# Patient Record
Sex: FEMALE | Race: BLACK OR AFRICAN AMERICAN | Employment: UNEMPLOYED | URBAN - METROPOLITAN AREA
[De-identification: names, ages, dates, MRNs, and addresses within clinical notes are randomized per-mention and may not be internally consistent; named-entity substitution may affect disease eponyms.]

---

## 2017-01-11 ENCOUNTER — HOSPITAL ENCOUNTER (INPATIENT)
Age: 52
LOS: 3 days | Discharge: HOME | DRG: 133 | End: 2017-01-14
Attending: EMERGENCY MEDICINE | Admitting: INTERNAL MEDICINE
Payer: MEDICAID

## 2017-01-11 ENCOUNTER — APPOINTMENT (OUTPATIENT)
Dept: CT IMAGING | Age: 52
DRG: 133 | End: 2017-01-11
Attending: PHYSICIAN ASSISTANT
Payer: MEDICAID

## 2017-01-11 ENCOUNTER — APPOINTMENT (OUTPATIENT)
Dept: GENERAL RADIOLOGY | Age: 52
DRG: 133 | End: 2017-01-11
Attending: EMERGENCY MEDICINE
Payer: MEDICAID

## 2017-01-11 DIAGNOSIS — G93.40 ACUTE ENCEPHALOPATHY: ICD-10-CM

## 2017-01-11 DIAGNOSIS — J96.02 ACUTE RESPIRATORY FAILURE WITH HYPERCAPNIA (HCC): Primary | ICD-10-CM

## 2017-01-11 DIAGNOSIS — F19.10 SUBSTANCE ABUSE (HCC): ICD-10-CM

## 2017-01-11 PROBLEM — J96.90 RESPIRATORY FAILURE (HCC): Status: ACTIVE | Noted: 2017-01-11

## 2017-01-11 LAB
ALBUMIN SERPL BCP-MCNC: 4.2 G/DL (ref 3.4–5)
ALBUMIN/GLOB SERPL: 1.1 {RATIO} (ref 0.8–1.7)
ALP SERPL-CCNC: 122 U/L (ref 45–117)
ALT SERPL-CCNC: 25 U/L (ref 13–56)
AMPHET UR QL SCN: NEGATIVE
ANION GAP BLD CALC-SCNC: 9 MMOL/L (ref 3–18)
APPEARANCE UR: CLEAR
ARTERIAL PATENCY WRIST A: ABNORMAL
ARTERIAL PATENCY WRIST A: YES
AST SERPL W P-5'-P-CCNC: 9 U/L (ref 15–37)
BACTERIA URNS QL MICRO: ABNORMAL /HPF
BARBITURATES UR QL SCN: NEGATIVE
BASE DEFICIT BLD-SCNC: 1 MMOL/L
BASE DEFICIT BLD-SCNC: 2 MMOL/L
BASOPHILS # BLD AUTO: 0 K/UL (ref 0–0.1)
BASOPHILS # BLD: 0 % (ref 0–2)
BDY SITE: ABNORMAL
BDY SITE: ABNORMAL
BENZODIAZ UR QL: NEGATIVE
BILIRUB SERPL-MCNC: 0.2 MG/DL (ref 0.2–1)
BILIRUB UR QL: NEGATIVE
BNP SERPL-MCNC: 177 PG/ML (ref 0–900)
BUN SERPL-MCNC: 16 MG/DL (ref 7–18)
BUN/CREAT SERPL: 11 (ref 12–20)
CALCIUM SERPL-MCNC: 9.2 MG/DL (ref 8.5–10.1)
CANNABINOIDS UR QL SCN: NEGATIVE
CHLORIDE SERPL-SCNC: 101 MMOL/L (ref 100–108)
CK MB CFR SERPL CALC: ABNORMAL % (ref 0–4)
CK MB SERPL-MCNC: <0.5 NG/ML (ref 0.5–3.6)
CK SERPL-CCNC: 26 U/L (ref 26–192)
CO2 SERPL-SCNC: 29 MMOL/L (ref 21–32)
COCAINE UR QL SCN: NEGATIVE
COLOR UR: YELLOW
CREAT SERPL-MCNC: 1.52 MG/DL (ref 0.6–1.3)
DIFFERENTIAL METHOD BLD: ABNORMAL
EOSINOPHIL # BLD: 0 K/UL (ref 0–0.4)
EOSINOPHIL NFR BLD: 0 % (ref 0–5)
EPITH CASTS URNS QL MICRO: ABNORMAL /LPF (ref 0–5)
ERYTHROCYTE [DISTWIDTH] IN BLOOD BY AUTOMATED COUNT: 14.3 % (ref 11.6–14.5)
EST. AVERAGE GLUCOSE BLD GHB EST-MCNC: 100 MG/DL
GAS FLOW.O2 O2 DELIVERY SYS: ABNORMAL L/MIN
GAS FLOW.O2 O2 DELIVERY SYS: ABNORMAL L/MIN
GAS FLOW.O2 SETTING OXYMISER: 16 BPM
GAS FLOW.O2 SETTING OXYMISER: 16 BPM
GLOBULIN SER CALC-MCNC: 4 G/DL (ref 2–4)
GLUCOSE SERPL-MCNC: 139 MG/DL (ref 74–99)
GLUCOSE UR STRIP.AUTO-MCNC: NEGATIVE MG/DL
HBA1C MFR BLD: 5.1 % (ref 4.2–5.6)
HCG UR QL: NEGATIVE
HCO3 BLD-SCNC: 27 MMOL/L (ref 22–26)
HCO3 BLD-SCNC: 27.9 MMOL/L (ref 22–26)
HCT VFR BLD AUTO: 43.6 % (ref 35–45)
HDSCOM,HDSCOM: NORMAL
HGB BLD-MCNC: 14.1 G/DL (ref 12–16)
HGB UR QL STRIP: NEGATIVE
INR PPP: 0.9 (ref 0.8–1.2)
INSPIRATION.DURATION SETTING TIME VENT: 1 SEC
KETONES UR QL STRIP.AUTO: NEGATIVE MG/DL
LACTATE BLD-SCNC: 2.6 MMOL/L (ref 0.4–2)
LEUKOCYTE ESTERASE UR QL STRIP.AUTO: NEGATIVE
LYMPHOCYTES # BLD AUTO: 17 % (ref 21–52)
LYMPHOCYTES # BLD: 2.5 K/UL (ref 0.9–3.6)
MAGNESIUM SERPL-MCNC: 3.6 MG/DL (ref 1.8–2.4)
MCH RBC QN AUTO: 29.6 PG (ref 24–34)
MCHC RBC AUTO-ENTMCNC: 32.3 G/DL (ref 31–37)
MCV RBC AUTO: 91.4 FL (ref 74–97)
METHADONE UR QL: NEGATIVE
MONOCYTES # BLD: 0.8 K/UL (ref 0.05–1.2)
MONOCYTES NFR BLD AUTO: 5 % (ref 3–10)
NEUTS SEG # BLD: 11.3 K/UL (ref 1.8–8)
NEUTS SEG NFR BLD AUTO: 78 % (ref 40–73)
NITRITE UR QL STRIP.AUTO: NEGATIVE
O2/TOTAL GAS SETTING VFR VENT: 100 %
O2/TOTAL GAS SETTING VFR VENT: 35 %
OPIATES UR QL: NEGATIVE
PCO2 BLD: 62 MMHG (ref 35–45)
PCO2 BLD: 62.6 MMHG (ref 35–45)
PCP UR QL: NEGATIVE
PEEP RESPIRATORY: 5 CMH2O
PEEP RESPIRATORY: 5 CMH2O
PH BLD: 7.25 [PH] (ref 7.35–7.45)
PH BLD: 7.26 [PH] (ref 7.35–7.45)
PH UR STRIP: 6.5 [PH] (ref 5–8)
PIP ISTAT,IPIP: 23
PLATELET # BLD AUTO: 416 K/UL (ref 135–420)
PMV BLD AUTO: 11 FL (ref 9.2–11.8)
PO2 BLD: 124 MMHG (ref 80–100)
PO2 BLD: 387 MMHG (ref 80–100)
POTASSIUM SERPL-SCNC: 4.1 MMOL/L (ref 3.5–5.5)
PROT SERPL-MCNC: 8.2 G/DL (ref 6.4–8.2)
PROT UR STRIP-MCNC: ABNORMAL MG/DL
PROTHROMBIN TIME: 12.3 SEC (ref 11.5–15.2)
RBC # BLD AUTO: 4.77 M/UL (ref 4.2–5.3)
RBC #/AREA URNS HPF: ABNORMAL /HPF (ref 0–5)
SAO2 % BLD: 100 % (ref 92–97)
SAO2 % BLD: 98 % (ref 92–97)
SERVICE CMNT-IMP: ABNORMAL
SERVICE CMNT-IMP: ABNORMAL
SODIUM SERPL-SCNC: 139 MMOL/L (ref 136–145)
SP GR UR REFRACTOMETRY: 1.01 (ref 1–1.03)
SPECIMEN TYPE: ABNORMAL
SPECIMEN TYPE: ABNORMAL
T4 FREE SERPL-MCNC: 1 NG/DL (ref 0.7–1.5)
TOTAL RESP. RATE, ITRR: 19
TOTAL RESP. RATE, ITRR: 20
TROPONIN I SERPL-MCNC: <0.02 NG/ML (ref 0–0.04)
TSH SERPL DL<=0.05 MIU/L-ACNC: 0.58 UIU/ML (ref 0.36–3.74)
UROBILINOGEN UR QL STRIP.AUTO: 0.2 EU/DL (ref 0.2–1)
VENTILATION MODE VENT: ABNORMAL
VENTILATION MODE VENT: ABNORMAL
VOLUME CONTROL PLUS IVLCP: YES
VOLUME CONTROL PLUS IVLCP: YES
VT SETTING VENT: 341 ML
VT SETTING VENT: 500 ML
WBC # BLD AUTO: 14.6 K/UL (ref 4.6–13.2)
WBC URNS QL MICRO: ABNORMAL /HPF (ref 0–4)

## 2017-01-11 PROCEDURE — 80053 COMPREHEN METABOLIC PANEL: CPT | Performed by: EMERGENCY MEDICINE

## 2017-01-11 PROCEDURE — 74011000258 HC RX REV CODE- 258: Performed by: PHYSICIAN ASSISTANT

## 2017-01-11 PROCEDURE — 82550 ASSAY OF CK (CPK): CPT | Performed by: EMERGENCY MEDICINE

## 2017-01-11 PROCEDURE — 83036 HEMOGLOBIN GLYCOSYLATED A1C: CPT | Performed by: PHYSICIAN ASSISTANT

## 2017-01-11 PROCEDURE — 5A1935Z RESPIRATORY VENTILATION, LESS THAN 24 CONSECUTIVE HOURS: ICD-10-PCS | Performed by: EMERGENCY MEDICINE

## 2017-01-11 PROCEDURE — 80307 DRUG TEST PRSMV CHEM ANLYZR: CPT | Performed by: EMERGENCY MEDICINE

## 2017-01-11 PROCEDURE — 0BH17EZ INSERTION OF ENDOTRACHEAL AIRWAY INTO TRACHEA, VIA NATURAL OR ARTIFICIAL OPENING: ICD-10-PCS | Performed by: EMERGENCY MEDICINE

## 2017-01-11 PROCEDURE — 85610 PROTHROMBIN TIME: CPT | Performed by: PHYSICIAN ASSISTANT

## 2017-01-11 PROCEDURE — 96365 THER/PROPH/DIAG IV INF INIT: CPT

## 2017-01-11 PROCEDURE — 93005 ELECTROCARDIOGRAM TRACING: CPT

## 2017-01-11 PROCEDURE — 82803 BLOOD GASES ANY COMBINATION: CPT

## 2017-01-11 PROCEDURE — 83735 ASSAY OF MAGNESIUM: CPT | Performed by: EMERGENCY MEDICINE

## 2017-01-11 PROCEDURE — 94640 AIRWAY INHALATION TREATMENT: CPT

## 2017-01-11 PROCEDURE — 77030035230

## 2017-01-11 PROCEDURE — 83880 ASSAY OF NATRIURETIC PEPTIDE: CPT | Performed by: EMERGENCY MEDICINE

## 2017-01-11 PROCEDURE — 31500 INSERT EMERGENCY AIRWAY: CPT

## 2017-01-11 PROCEDURE — 87040 BLOOD CULTURE FOR BACTERIA: CPT | Performed by: PHYSICIAN ASSISTANT

## 2017-01-11 PROCEDURE — 71010 XR CHEST PORT: CPT

## 2017-01-11 PROCEDURE — 94002 VENT MGMT INPAT INIT DAY: CPT

## 2017-01-11 PROCEDURE — 81001 URINALYSIS AUTO W/SCOPE: CPT | Performed by: EMERGENCY MEDICINE

## 2017-01-11 PROCEDURE — 84443 ASSAY THYROID STIM HORMONE: CPT | Performed by: EMERGENCY MEDICINE

## 2017-01-11 PROCEDURE — 74011250636 HC RX REV CODE- 250/636: Performed by: EMERGENCY MEDICINE

## 2017-01-11 PROCEDURE — 87086 URINE CULTURE/COLONY COUNT: CPT | Performed by: EMERGENCY MEDICINE

## 2017-01-11 PROCEDURE — 74011000250 HC RX REV CODE- 250: Performed by: INTERNAL MEDICINE

## 2017-01-11 PROCEDURE — 36600 WITHDRAWAL OF ARTERIAL BLOOD: CPT

## 2017-01-11 PROCEDURE — 74000 XR ABD (KUB): CPT

## 2017-01-11 PROCEDURE — 99285 EMERGENCY DEPT VISIT HI MDM: CPT

## 2017-01-11 PROCEDURE — 83605 ASSAY OF LACTIC ACID: CPT

## 2017-01-11 PROCEDURE — 51702 INSERT TEMP BLADDER CATH: CPT

## 2017-01-11 PROCEDURE — 65610000006 HC RM INTENSIVE CARE

## 2017-01-11 PROCEDURE — 85025 COMPLETE CBC W/AUTO DIFF WBC: CPT | Performed by: EMERGENCY MEDICINE

## 2017-01-11 PROCEDURE — 74011250636 HC RX REV CODE- 250/636: Performed by: PHYSICIAN ASSISTANT

## 2017-01-11 PROCEDURE — 84439 ASSAY OF FREE THYROXINE: CPT | Performed by: EMERGENCY MEDICINE

## 2017-01-11 PROCEDURE — 96366 THER/PROPH/DIAG IV INF ADDON: CPT

## 2017-01-11 PROCEDURE — 74011000250 HC RX REV CODE- 250: Performed by: PHYSICIAN ASSISTANT

## 2017-01-11 PROCEDURE — 81025 URINE PREGNANCY TEST: CPT | Performed by: EMERGENCY MEDICINE

## 2017-01-11 PROCEDURE — 74011250636 HC RX REV CODE- 250/636

## 2017-01-11 PROCEDURE — 74011000250 HC RX REV CODE- 250: Performed by: EMERGENCY MEDICINE

## 2017-01-11 RX ORDER — INSULIN LISPRO 100 [IU]/ML
INJECTION, SOLUTION INTRAVENOUS; SUBCUTANEOUS EVERY 6 HOURS
Status: DISCONTINUED | OUTPATIENT
Start: 2017-01-11 | End: 2017-01-12

## 2017-01-11 RX ORDER — SODIUM CHLORIDE 9 MG/ML
100 INJECTION, SOLUTION INTRAVENOUS CONTINUOUS
Status: DISCONTINUED | OUTPATIENT
Start: 2017-01-11 | End: 2017-01-12

## 2017-01-11 RX ORDER — HEPARIN SODIUM 5000 [USP'U]/ML
5000 INJECTION, SOLUTION INTRAVENOUS; SUBCUTANEOUS EVERY 8 HOURS
Status: DISCONTINUED | OUTPATIENT
Start: 2017-01-11 | End: 2017-01-14 | Stop reason: HOSPADM

## 2017-01-11 RX ORDER — KETAMINE HYDROCHLORIDE 50 MG/ML
200 INJECTION, SOLUTION INTRAMUSCULAR; INTRAVENOUS ONCE
Status: COMPLETED | OUTPATIENT
Start: 2017-01-11 | End: 2017-01-11

## 2017-01-11 RX ORDER — ROCURONIUM BROMIDE 10 MG/ML
75 INJECTION, SOLUTION INTRAVENOUS
Status: ACTIVE | OUTPATIENT
Start: 2017-01-11 | End: 2017-01-12

## 2017-01-11 RX ORDER — IPRATROPIUM BROMIDE AND ALBUTEROL SULFATE 2.5; .5 MG/3ML; MG/3ML
3 SOLUTION RESPIRATORY (INHALATION)
Status: COMPLETED | OUTPATIENT
Start: 2017-01-11 | End: 2017-01-12

## 2017-01-11 RX ORDER — DEXTROSE 50 % IN WATER (D50W) INTRAVENOUS SYRINGE
25-50 AS NEEDED
Status: DISCONTINUED | OUTPATIENT
Start: 2017-01-11 | End: 2017-01-14 | Stop reason: HOSPADM

## 2017-01-11 RX ORDER — BUDESONIDE 0.5 MG/2ML
500 INHALANT ORAL
Status: DISCONTINUED | OUTPATIENT
Start: 2017-01-11 | End: 2017-01-12

## 2017-01-11 RX ORDER — IPRATROPIUM BROMIDE AND ALBUTEROL SULFATE 2.5; .5 MG/3ML; MG/3ML
3 SOLUTION RESPIRATORY (INHALATION)
Status: DISCONTINUED | OUTPATIENT
Start: 2017-01-11 | End: 2017-01-12

## 2017-01-11 RX ORDER — LABETALOL HCL 20 MG/4 ML
20 SYRINGE (ML) INTRAVENOUS
Status: DISCONTINUED | OUTPATIENT
Start: 2017-01-11 | End: 2017-01-11

## 2017-01-11 RX ORDER — ROCURONIUM BROMIDE 10 MG/ML
INJECTION, SOLUTION INTRAVENOUS
Status: DISPENSED
Start: 2017-01-11 | End: 2017-01-12

## 2017-01-11 RX ORDER — CHLORHEXIDINE GLUCONATE 1.2 MG/ML
10 RINSE ORAL ONCE
Status: DISCONTINUED | OUTPATIENT
Start: 2017-01-11 | End: 2017-01-11

## 2017-01-11 RX ORDER — CHLORHEXIDINE GLUCONATE 1.2 MG/ML
10 RINSE ORAL EVERY 12 HOURS
Status: DISCONTINUED | OUTPATIENT
Start: 2017-01-11 | End: 2017-01-12

## 2017-01-11 RX ORDER — MAGNESIUM SULFATE 100 %
4 CRYSTALS MISCELLANEOUS AS NEEDED
Status: DISCONTINUED | OUTPATIENT
Start: 2017-01-11 | End: 2017-01-14 | Stop reason: HOSPADM

## 2017-01-11 RX ORDER — IBUPROFEN 200 MG
1 TABLET ORAL DAILY
Status: DISCONTINUED | OUTPATIENT
Start: 2017-01-12 | End: 2017-01-14 | Stop reason: HOSPADM

## 2017-01-11 RX ORDER — TERBUTALINE SULFATE 1 MG/ML
0.25 INJECTION SUBCUTANEOUS
Status: COMPLETED | OUTPATIENT
Start: 2017-01-11 | End: 2017-01-11

## 2017-01-11 RX ORDER — HYDRALAZINE HYDROCHLORIDE 20 MG/ML
20 INJECTION INTRAMUSCULAR; INTRAVENOUS
Status: DISCONTINUED | OUTPATIENT
Start: 2017-01-11 | End: 2017-01-14 | Stop reason: HOSPADM

## 2017-01-11 RX ORDER — IPRATROPIUM BROMIDE 0.5 MG/2.5ML
0.5 SOLUTION RESPIRATORY (INHALATION)
Status: DISCONTINUED | OUTPATIENT
Start: 2017-01-11 | End: 2017-01-11

## 2017-01-11 RX ORDER — MIDAZOLAM HYDROCHLORIDE 1 MG/ML
1-2 INJECTION, SOLUTION INTRAMUSCULAR; INTRAVENOUS
Status: DISCONTINUED | OUTPATIENT
Start: 2017-01-11 | End: 2017-01-12

## 2017-01-11 RX ORDER — PROPOFOL 10 MG/ML
5-50 VIAL (ML) INTRAVENOUS
Status: DISCONTINUED | OUTPATIENT
Start: 2017-01-11 | End: 2017-01-12

## 2017-01-11 RX ORDER — FENTANYL CITRATE 50 UG/ML
25 INJECTION, SOLUTION INTRAMUSCULAR; INTRAVENOUS
Status: DISCONTINUED | OUTPATIENT
Start: 2017-01-11 | End: 2017-01-12

## 2017-01-11 RX ORDER — ROCURONIUM BROMIDE 10 MG/ML
INJECTION, SOLUTION INTRAVENOUS
Status: DISCONTINUED | OUTPATIENT
Start: 2017-01-11 | End: 2017-01-12

## 2017-01-11 RX ORDER — PROPOFOL 10 MG/ML
INJECTION, EMULSION INTRAVENOUS
Status: COMPLETED
Start: 2017-01-11 | End: 2017-01-11

## 2017-01-11 RX ORDER — FLUMAZENIL 0.1 MG/ML
INJECTION INTRAVENOUS
Status: DISPENSED
Start: 2017-01-11 | End: 2017-01-12

## 2017-01-11 RX ADMIN — PIPERACILLIN AND TAZOBACTAM 3.38 G: 3; .375 INJECTION, POWDER, LYOPHILIZED, FOR SOLUTION INTRAVENOUS; PARENTERAL at 23:08

## 2017-01-11 RX ADMIN — IPRATROPIUM BROMIDE AND ALBUTEROL SULFATE 3 ML: 2.5; .5 SOLUTION RESPIRATORY (INHALATION) at 19:38

## 2017-01-11 RX ADMIN — IPRATROPIUM BROMIDE AND ALBUTEROL SULFATE 3 ML: .5; 3 SOLUTION RESPIRATORY (INHALATION) at 23:10

## 2017-01-11 RX ADMIN — ROCURONIUM BROMIDE 75 MG: 10 INJECTION, SOLUTION INTRAVENOUS at 17:26

## 2017-01-11 RX ADMIN — TERBUTALINE SULFATE 0.25 MG: 1 INJECTION, SOLUTION SUBCUTANEOUS at 17:57

## 2017-01-11 RX ADMIN — KETAMINE HYDROCHLORIDE 200 MG: 50 INJECTION, SOLUTION INTRAMUSCULAR; INTRAVENOUS at 17:31

## 2017-01-11 RX ADMIN — SODIUM CHLORIDE 1000 ML: 900 INJECTION, SOLUTION INTRAVENOUS at 20:58

## 2017-01-11 RX ADMIN — SODIUM CHLORIDE 100 ML/HR: 900 INJECTION, SOLUTION INTRAVENOUS at 23:09

## 2017-01-11 RX ADMIN — PROPOFOL 35 MCG/KG/MIN: 10 INJECTION, EMULSION INTRAVENOUS at 20:52

## 2017-01-11 RX ADMIN — PROPOFOL 5 MCG/KG/MIN: 10 INJECTION, EMULSION INTRAVENOUS at 18:05

## 2017-01-11 RX ADMIN — IPRATROPIUM BROMIDE AND ALBUTEROL SULFATE 3 ML: .5; 3 SOLUTION RESPIRATORY (INHALATION) at 23:13

## 2017-01-11 RX ADMIN — BUDESONIDE 500 MCG: 0.5 SUSPENSION RESPIRATORY (INHALATION) at 20:00

## 2017-01-11 NOTE — IP AVS SNAPSHOT
Current Discharge Medication List  
  
Take these medications at their scheduled times Dose & Instructions Dispensing Information Comments Morning Noon Evening Bedtime  
 amLODIPine 5 mg tablet Commonly known as:  Tomrach Harder Your next dose is: Today, Tomorrow Other:  ____________ Dose:  5 mg Take 1 Tab by mouth daily. Quantity:  15 Tab Refills:  0  
     
   
   
   
  
 bumetanide 0.5 mg tablet Commonly known as:  Kenneth Corbin Your next dose is: Today, Tomorrow Other:  ____________ Dose:  0.5 mg Take 1 Tab by mouth every fourty-eight (48) hours. Quantity:  15 Tab Refills:  0  
     
   
   
   
  
 doxycycline 100 mg capsule Commonly known as:  VIBRAMYCIN Your next dose is: Today, Tomorrow Other:  ____________ Dose:  100 mg Take 1 Cap by mouth every twelve (12) hours for 5 days. Quantity:  10 Cap Refills:  0  
     
   
   
   
  
 famotidine 20 mg tablet Commonly known as:  PEPCID Your next dose is: Today, Tomorrow Other:  ____________ Dose:  20 mg Take 1 Tab by mouth daily. Quantity:  30 Tab Refills:  0 FLUoxetine 20 mg capsule Commonly known as:  PROzac Your next dose is: Today, Tomorrow Other:  ____________ Dose:  60 mg Take 3 Caps by mouth daily. Quantity:  90 Cap Refills:  1  
     
   
   
   
  
 fluticasone-salmeterol 250-50 mcg/dose diskus inhaler Commonly known as:  ADVAIR DISKUS Your next dose is: Today, Tomorrow Other:  ____________ Dose:  1 Puff Take 1 Puff by inhalation every twelve (12) hours. Quantity:  1 Inhaler Refills:  0  
     
   
   
   
  
 lisinopril 20 mg tablet Commonly known as:  Contreras Kelsey Your next dose is: Today, Tomorrow Other:  ____________ Dose:  20 mg Take 1 Tab by mouth daily. Quantity:  15 Tab Refills:  2 montelukast 10 mg tablet Commonly known as:  SINGULAIR Your next dose is: Today, Tomorrow Other:  ____________ Dose:  10 mg Take 1 Tab by mouth nightly. Quantity:  30 Tab Refills:  2  
     
   
   
   
  
 nicotine 14 mg/24 hr patch Commonly known as:  Bess Willson Your next dose is: Today, Tomorrow Other:  ____________ Dose:  1 Patch 1 Patch by TransDERmal route daily for 30 days. Quantity:  30 Patch Refills:  0  
     
   
   
   
  
 polyethylene glycol 17 gram packet Commonly known as:  Andreina Lily Your next dose is: Today, Tomorrow Other:  ____________ Dose:  17 g Take 1 Packet by mouth daily. Quantity:  30 Packet Refills:  3  
     
   
   
   
  
 senna-docusate 8.6-50 mg per tablet Commonly known as:  Adriana Anon Your next dose is: Today, Tomorrow Other:  ____________ Dose:  2 Tab Take 2 Tabs by mouth nightly. HOLD for loose stool. Quantity:  60 Tab Refills:  1  
     
   
   
   
  
 solifenacin 5 mg tablet Commonly known as:  VESIcare Your next dose is: Today, Tomorrow Other:  ____________ Dose:  5 mg Take 1 Tab by mouth daily. Quantity:  30 Tab Refills:  1  
     
   
   
   
  
 tiotropium 18 mcg inhalation capsule Commonly known as:  Gwenetta Lass Your next dose is: Today, Tomorrow Other:  ____________ Dose:  1 Cap Take 1 Cap by inhalation daily. Quantity:  30 Cap Refills:  0 Take these medications as needed Dose & Instructions Dispensing Information Comments Morning Noon Evening Bedtime * albuterol 90 mcg/actuation inhaler Commonly known as:  VENTOLIN HFA Your next dose is: Today, Tomorrow Other:  ____________ Dose:  2 Puff Take 2 Puffs by inhalation every six (6) hours as needed for Wheezing or Shortness of Breath. Quantity:  1 Inhaler Refills:  0  
     
   
   
   
  
 oxyCODONE-acetaminophen  mg per tablet Commonly known as:  PERCOCET 10 Your next dose is: Today, Tomorrow Other:  ____________ Dose:  1 Tab Take 1 Tab by mouth every eight (8) hours as needed. Max Daily Amount: 3 Tabs. Quantity:  30 Tab Refills:  0  
     
   
   
   
  
 * Notice: This list has 1 medication(s) that are the same as other medications prescribed for you. Read the directions carefully, and ask your doctor or other care provider to review them with you. Take these medications as directed Dose & Instructions Dispensing Information Comments Morning Noon Evening Bedtime * albuterol sulfate 2.5 mg/0.5 mL Nebu nebulizer solution Commonly known as:  PROVENTIL;VENTOLIN Your next dose is: Today, Tomorrow Other:  ____________ One nebulizer treatment every 6 hours while awake x 5 days  then every 6 hours as needed for shortness of breath and/or wheezing. Quantity:  50 mL Refills:  0  
     
   
   
   
  
 * Notice: This list has 1 medication(s) that are the same as other medications prescribed for you. Read the directions carefully, and ask your doctor or other care provider to review them with you. Where to Get Your Medications Information about where to get these medications is not yet available ! Ask your nurse or doctor about these medications  
  albuterol sulfate 2.5 mg/0.5 mL Nebu nebulizer solution  
 bumetanide 0.5 mg tablet  
 doxycycline 100 mg capsule  
 nicotine 14 mg/24 hr patch  
 oxyCODONE-acetaminophen  mg per tablet

## 2017-01-11 NOTE — IP AVS SNAPSHOT
[address] [address] [place] Patient: Theresa Dean MRN: [id number] :1965 You are allergic to the following No active allergies Recent Documentation Height Weight BMI Smoking Status 1.499 m 124.3 kg 55.35 kg/m2 Never Smoker Unresulted Labs Order Current Status CULTURE, BLOOD Preliminary result CULTURE, BLOOD Preliminary result Emergency Contacts Name Discharge Info Relation Home Work Mobile Monica Denis  Daughter [21] [phone] Sam Michaud  Daughter [21] [phone] Marylou Melendez [5] [phone] About your hospitalization You were admitted on:  2017 You last received care in the:  [address] [place] [address] You were discharged on:  2017 Unit phone number:  [phone] Why you were hospitalized Your primary diagnosis was:  Not on File Your diagnoses also included:  Respiratory Failure (Hcc), Acute Encephalopathy Providers Seen During Your Hospitalizations Provider Role Specialty Primary office phone Lavern Cuba MD Attending Provider Emergency Medicine [phone] Chalo Olivares MD Attending Provider Internal Medicine [phone] Your Primary Care Physician (PCP) Primary Care Physician Office Phone Office Fax Nohelia [facility] [phone] Follow-up Information Follow up With Details Comments Contact Info Pat Gordillo MD  Patient is nt seen at this office. Attempted to call patient to discuss current PCP ,left message. Indiividual returned call saying the patient cannot be reached at the listed phone number. No alternative number given. [address] [place] [id number] [phone] Current Discharge Medication List  
  
START taking these medications Dose & Instructions Dispensing Information Comments Morning Noon Evening Bedtime  
 bumetanide 0.5 mg tablet Commonly known as:  Silvia Harms Your next dose is: Today, Tomorrow Other:  _________ Dose:  0.5 mg Take 1 Tab by mouth every fourty-eight (48) hours. Quantity:  15 Tab Refills:  0  
     
   
   
   
  
 doxycycline 100 mg capsule Commonly known as:  VIBRAMYCIN Your next dose is: Today, Tomorrow Other:  _________ Dose:  100 mg Take 1 Cap by mouth every twelve (12) hours for 5 days. Quantity:  10 Cap Refills:  0  
     
   
   
   
  
 nicotine 14 mg/24 hr patch Commonly known as:  Nhi Ruizer Your next dose is: Today, Tomorrow Other:  _________ Dose:  1 Patch 1 Patch by TransDERmal route daily for 30 days. Quantity:  30 Patch Refills:  0 CONTINUE these medications which have CHANGED Dose & Instructions Dispensing Information Comments Morning Noon Evening Bedtime  
 oxyCODONE-acetaminophen  mg per tablet Commonly known as:  PERCOCET 10 What changed:  when to take this Your next dose is: Today, Tomorrow Other:  _________ Dose:  1 Tab Take 1 Tab by mouth every eight (8) hours as needed. Max Daily Amount: 3 Tabs. Quantity:  30 Tab Refills:  0 CONTINUE these medications which have NOT CHANGED Dose & Instructions Dispensing Information Comments Morning Noon Evening Bedtime * albuterol 90 mcg/actuation inhaler Commonly known as:  VENTOLIN HFA Your next dose is: Today, Tomorrow Other:  _________ Dose:  2 Puff Take 2 Puffs by inhalation every six (6) hours as needed for Wheezing or Shortness of Breath. Quantity:  1 Inhaler Refills:  0  
     
   
   
   
  
 * albuterol sulfate 2.5 mg/0.5 mL Nebu nebulizer solution Commonly known as:  PROVENTIL;VENTOLIN  
   
 Your next dose is: Today, Tomorrow Other:  _________ One nebulizer treatment every 6 hours while awake x 5 days  then every 6 hours as needed for shortness of breath and/or wheezing. Quantity:  50 mL Refills:  0  
     
   
   
   
  
 amLODIPine 5 mg tablet Commonly known as:  Love Breach Your next dose is: Today, Tomorrow Other:  _________ Dose:  5 mg Take 1 Tab by mouth daily. Quantity:  15 Tab Refills:  0  
     
   
   
   
  
 famotidine 20 mg tablet Commonly known as:  PEPCID Your next dose is: Today, Tomorrow Other:  _________ Dose:  20 mg Take 1 Tab by mouth daily. Quantity:  30 Tab Refills:  0 FLUoxetine 20 mg capsule Commonly known as:  PROzac Your next dose is: Today, Tomorrow Other:  _________ Dose:  60 mg Take 3 Caps by mouth daily. Quantity:  90 Cap Refills:  1  
     
   
   
   
  
 fluticasone-salmeterol 250-50 mcg/dose diskus inhaler Commonly known as:  ADVAIR DISKUS Your next dose is: Today, Tomorrow Other:  _________ Dose:  1 Puff Take 1 Puff by inhalation every twelve (12) hours. Quantity:  1 Inhaler Refills:  0  
     
   
   
   
  
 lisinopril 20 mg tablet Commonly known as:  Linton Escort Your next dose is: Today, Tomorrow Other:  _________ Dose:  20 mg Take 1 Tab by mouth daily. Quantity:  15 Tab Refills:  2  
     
   
   
   
  
 montelukast 10 mg tablet Commonly known as:  SINGULAIR Your next dose is: Today, Tomorrow Other:  _________ Dose:  10 mg Take 1 Tab by mouth nightly. Quantity:  30 Tab Refills:  2  
     
   
   
   
  
 polyethylene glycol 17 gram packet Commonly known as:  Alonza Schimke Your next dose is: Today, Tomorrow Other:  _________ Dose:  17 g Take 1 Packet by mouth daily. Quantity:  30 Packet Refills:  3  
     
   
   
   
  
 senna-docusate 8.6-50 mg per tablet Commonly known as:  Farhat Coon Your next dose is: Today, Tomorrow Other:  _________ Dose:  2 Tab Take 2 Tabs by mouth nightly. HOLD for loose stool. Quantity:  60 Tab Refills:  1  
     
   
   
   
  
 solifenacin 5 mg tablet Commonly known as:  VESIcare Your next dose is: Today, Tomorrow Other:  _________ Dose:  5 mg Take 1 Tab by mouth daily. Quantity:  30 Tab Refills:  1  
     
   
   
   
  
 tiotropium 18 mcg inhalation capsule Commonly known as:  Renetta Siddharthaer Your next dose is: Today, Tomorrow Other:  _________ Dose:  1 Cap Take 1 Cap by inhalation daily. Quantity:  30 Cap Refills:  0  
     
   
   
   
  
 * Notice: This list has 2 medication(s) that are the same as other medications prescribed for you. Read the directions carefully, and ask your doctor or other care provider to review them with you. STOP taking these medications ALPRAZolam 0.25 mg tablet Commonly known as:  XANAX  
   
  
 amitriptyline 25 mg tablet Commonly known as:  ELAVIL  
   
  
 guaiFENesin  mg SR tablet Commonly known as:  MUCINEX  
   
  
 predniSONE 20 mg tablet Commonly known as:  Aranza Jessica Where to Get Your Medications Information on where to get these meds will be given to you by the nurse or doctor. ! Ask your nurse or doctor about these medications  
  albuterol sulfate 2.5 mg/0.5 mL Nebu nebulizer solution  
 bumetanide 0.5 mg tablet  
 doxycycline 100 mg capsule  
 nicotine 14 mg/24 hr patch  
 oxyCODONE-acetaminophen  mg per tablet Discharge Instructions DISCHARGE SUMMARY from Nurse The following personal items are in your possession at time of discharge: 
 
Dental Appliances: None Home Medications: None Jewelry: None Clothing: Pants, Undergarments Other Valuables: None PATIENT INSTRUCTIONS: 
 
 
F-face looks uneven A-arms unable to move or move unevenly S-speech slurred or non-existent T-time-call 911 as soon as signs and symptoms begin-DO NOT go Back to bed or wait to see if you get better-TIME IS BRAIN. Warning Signs of HEART ATTACK Call 911 if you have these symptoms: 
? Chest discomfort. Most heart attacks involve discomfort in the center of the chest that lasts more than a few minutes, or that goes away and comes back. It can feel like uncomfortable pressure, squeezing, fullness, or pain. ? Discomfort in other areas of the upper body. Symptoms can include pain or discomfort in one or both arms, the back, neck, jaw, or stomach. ? Shortness of breath with or without chest discomfort. ? Other signs may include breaking out in a cold sweat, nausea, or lightheadedness. Don't wait more than five minutes to call [address]! Fast action can save your life. Calling 911 is almost always the fastest way to get lifesaving treatment. Emergency Medical Services staff can begin treatment when they arrive  up to an hour sooner than if someone gets to the hospital by car. The discharge information has been reviewed with the patient. The patient verbalized understanding. Discharge medications reviewed with the patient and appropriate educational materials and side effects teaching were provided. [employer] Activation Thank you for requesting access to [employer]. Please follow the instructions below to securely access and download your online medical record.  [employer] allows you to send messages to your doctor, view your test results, renew your prescriptions, schedule appointments, and more. How Do I Sign Up? 1. In your internet browser, go to [email or web address] 2. Click on the First Time User? Click Here link in the Sign In box. You will see the New Member Sign Up page. 3. Enter your [employer] Access Code exactly as it appears below. You will not need to use this code after youve completed the sign-up process. If you do not sign up before the expiration date, you must request a new code. [employer] Access Code: [email or web address] Expires: 2017  5:59 PM (This is the date your [employer] access code will ) 4. Enter the last four digits of your Social Security Number (xxxx) and Date of Birth (mm/dd/yyyy) as indicated and click Submit. You will be taken to the next sign-up page. 5. Create a [employer] ID. This will be your [employer] login ID and cannot be changed, so think of one that is secure and easy to remember. 6. Create a [employer] password. You can change your password at any time. 7. Enter your Password Reset Question and Answer. This can be used at a later time if you forget your password. 8. Enter your e-mail address. You will receive e-mail notification when new information is available in [address] 9. Click Sign Up. You can now view and download portions of your medical record. 10. Click the Download Summary menu link to download a portable copy of your medical information. Additional Information If you have questions, please visit the Frequently Asked Questions section of the [employer] website at [email or web address] Remember, [employer] is NOT to be used for urgent needs. For medical emergencies, dial 911. Patient armband removed and shredded. Discharge Orders Procedure Order Date Status Priority Quantity Spec Type Associated Dx DIET DIABETIC CONSISTENT CARB Regular; AHA-LOW-CHOL FAT 01/14/17 1529 Normal Routine 1 Questions: Texture:  Regular Cardiac:  AHA-LOW-CHOL FAT METABOLIC PANEL, BASIC 19/70/82 1529 Future Routine 1 Blood Comments:  On 1/18/17. Call report to PCP Reason: CHF [facility] Announcement We are excited to announce that we are making your provider's discharge notes available to you in [facility]. You will see these notes when they are completed and signed by the physician that discharged you from your recent hospital stay. If you have any questions or concerns about any information you see in [facility], please call the Health Information Department where you were seen or reach out to your Primary Care Provider for more information about your plan of care. Introducing [facility]! [facility] introduces [facility] patient portal. Now you can access parts of your medical record, email your doctor's office, and request medication refills online. 1. In your internet browser, go to [email or web address] 2. Click on the First Time User? Click Here link in the Sign In box. You will see the New Member Sign Up page. 3. Enter your [facility] Access Code exactly as it appears below. You will not need to use this code after youve completed the sign-up process. If you do not sign up before the expiration date, you must request a new code. · [facility] Access Code: [email or web address] Expires: 2/21/2017  5:59 PM 
 
4. Enter the last four digits of your Social Security Number (xxxx) and Date of Birth (mm/dd/yyyy) as indicated and click Submit. You will be taken to the next sign-up page. 5. Create a [facility] ID. This will be your [facility] login ID and cannot be changed, so think of one that is secure and easy to remember. 6. Create a [facility] password. You can change your password at any time. 7. Enter your Password Reset Question and Answer.  This can be used at a later time if you forget your password. 8. Enter your e-mail address. You will receive e-mail notification when new information is available in [address] 9. Click Sign Up. You can now view and download portions of your medical record. 10. Click the Download Summary menu link to download a portable copy of your medical information. If you have questions, please visit the Frequently Asked Questions section of the [employer] website. Remember, [employer] is NOT to be used for urgent needs. For medical emergencies, dial 911. Now available from your iPhone and Android! General Information Please provide this summary of care documentation to your next provider. Patient Signature:  ____________________________________________________________ Date:  ____________________________________________________________  
  
Oscar Shove Provider Signature:  ____________________________________________________________ Date:  ____________________________________________________________

## 2017-01-11 NOTE — ED PROVIDER NOTES
HPI Comments: 5:05 PM. Darshana Carroll is a 45 y.o. female with a history of COPD, asthma, and HTN who presents to the ED via EMS for respiratory distress. The patient c/o SOB and chest pain. Per EMS, the patient was discharged from [facility] earlier today with a diagnosis of COPD exacerbation. EMS was called for respiratory distress. Patient was placed on CPAP and administered Magnesium Sulfate, Solumedrol, and Albuterol en route. EMS reports that patient was moving very little air and has been altered. Minimal history obtainable due to acuity of condition. The history is provided by the patient and the EMS personnel. Past Medical History:   Diagnosis Date    Asthma     Chronic obstructive pulmonary disease ([facility] [address]     Endocrine disease      thyroid issues    Gastrointestinal disorder      \"blockage in my stomach\"    Hypertension        No past surgical history on file. No family history on file. Social History     Social History    Marital status:      Spouse name: N/A    Number of children: N/A    Years of education: N/A     Occupational History    Not on file. Social History Main Topics    Smoking status: Never Smoker    Smokeless tobacco: Never Used    Alcohol use Yes      Comment: socially    Drug use: Yes     Special: Cocaine, Heroin    Sexual activity: No      Comment: last used 9 years ago     Other Topics Concern    Not on file     Social History Narrative         ALLERGIES: Review of patient's allergies indicates no known allergies. Review of Systems   Unable to perform ROS: Severe respiratory distress       Vitals:    01/11/17 1733 01/11/17 1757 01/11/17 1800 01/11/17 1903   BP:  162/89 (!) 196/123 (!) 196/123   Pulse:  (!) 152 (!) 165 (!) 133   Resp:   23 20   Temp:       SpO2:   99% 100%   Weight: 122.9 kg (270 lb 15.1 oz)      Height: 5' 8\" (1.727 m)               Physical Exam   Constitutional: She appears well-developed and well-nourished. No distress. Obese.   HENT:   Head: Normocephalic and atraumatic. Mouth/Throat: Oropharynx is clear and moist.   Eyes: Conjunctivae and EOM are normal. Pupils are equal, round, and reactive to light. No scleral icterus. Neck: Trachea normal. Neck supple. No JVD present. No thyromegaly present. Cardiovascular: Regular rhythm, S1 normal and S2 normal.  Tachycardia present. Exam reveals no gallop and no friction rub. No murmur heard. Pulmonary/Chest: Accessory muscle usage present. She is in respiratory distress. She has decreased breath sounds (throughout, no wheezing auscultated. ). Prolonged expiratory phase with periods of apnea. Abdominal: Soft. Normal appearance. She exhibits no distension. There is no tenderness. There is no rigidity, no rebound and no guarding. Musculoskeletal: Normal range of motion. She exhibits no edema or tenderness. Neurological: She has normal strength. No cranial nerve deficit or sensory deficit. Coordination normal.   Drowsy, but arousable to sternal rub. Moves all extremities and follows some commands. Skin: Skin is warm and intact. No rash noted. Vitals reviewed. MDM  Number of Diagnoses or Management Options  Acute encephalopathy:   Acute respiratory failure with hypercapnia ([address]):   Substance abuse:   Diagnosis management comments: Danielle Zelaya is a 45 y.o. Female coming in with SOB and respiratory distress. On bipap, encephalopathic, started having periods of apnea and became more bradypneic and decision was made to intubate. ?? Substance abuse. Will admit to ICU.      Critical Care  Total time providing critical care: 30-74 minutes    ED Course       Intubation  Date/Time: 1/11/2017 5:31 PM  Performed by: Yumiko Russell by: Princess Souza     Consent:     Consent obtained:  Emergent situation  Universal protocol:     Relevant documents present and verified: yes      Test results available and properly labeled: yes      Required blood products, implants, devices, and special equipment available: yes      Site/side marked: yes      Immediately prior to procedure, a time out was called: yes      Patient identity confirmed:  Arm band  Pre-procedure details:     Patient status:  Altered mental status    Pretreatment meds: ketamine. Paralytics:  Rocuronium  Procedure details:     Preoxygenation:  BiPAP    CPR in progress: no      Intubation method:  Oral    Oral intubation technique:  Video-assisted    Laryngoscope blade: Mac 4    Tube size (mm):  7.5    Tube type:  Cuffed    Number of attempts:  1    Cricoid pressure: no      Tube visualized through cords: yes    Placement assessment:     ETT to lip:  21    Tube secured with:  ETT brumfield    Breath sounds:  Reduced on left    Placement verification: chest rise, condensation, direct visualization, ETCO2 detector and tube exhalation      CXR findings:  ETT in right main stem    Tube repositioned: yes    Post-procedure details:     Patient tolerance of procedure: Tolerated well, no immediate complications            Vitals:  Patient Vitals for the past 12 hrs:   Temp Pulse Resp BP SpO2   01/11/17 1903 - (!) 133 20 (!) 196/123 100 %   01/11/17 1800 - (!) 165 23 (!) 196/123 99 %   01/11/17 1757 - (!) 152 - 162/89 -   01/11/17 1730 97.7 °F (36.5 °C) (!) 150 19 160/79 100 %     100% on RA, indicating adequate oxygenation.     Medications ordered:   Medications   flumazenil (ROMAZICON) 0.1 mg/mL injection (not administered)   rocuronium (ZEMURON) injection (75 mg IntraVENous Given 1/11/17 1726)   propofol (DIPRIVAN) infusion (40 mcg/kg/min × 110 kg (Order-Specific) IntraVENous Rate Change 1/11/17 1844)   rocuronium (ZEMURON) injection 75 mg ( IntraVENous Canceled Entry 1/11/17 1742)   sodium chloride 0.9 % bolus infusion 1,000 mL (not administered)   midazolam (VERSED) injection 1-2 mg (not administered)   fentaNYL citrate (PF) injection 25 mcg (not administered)   chlorhexidine (PERIDEX) 0.12 % mouthwash 10 mL (not administered)   pantoprazole (PROTONIX) 40 mg in sodium chloride 0.9 % 10 mL injection (not administered)   heparin (porcine) injection 5,000 Units (not administered)   insulin lispro (HUMALOG) injection (not administered)   glucose chewable tablet 16 g (not administered)   glucagon (GLUCAGEN) injection 1 mg (not administered)   dextrose (D50W) injection syrg 12.5-25 g (not administered)   methylPREDNISolone (PF) (SOLU-MEDROL) injection 60 mg (not administered)   nicotine (NICODERM CQ) 14 mg/24 hr patch 1 Patch (not administered)   albuterol-ipratropium (DUO-NEB) 2.5 MG-0.5 MG/3 ML (not administered)   budesonide (PULMICORT) 500 mcg/2 ml nebulizer suspension (not administered)   0.9% sodium chloride infusion (not administered)   ketamine (KETALAR) 50 mg/mL injection 200 mg (200 mg IntraVENous Given 1/11/17 7311)   terbutaline (BRETHINE) injection 0.25 mg (0.25 mg SubCUTAneous Given 1/11/17 9027)         Lab findings:  Recent Results (from the past 12 hour(s))   EKG, 12 LEAD, INITIAL    Collection Time: 01/11/17  5:09 PM   Result Value Ref Range    Ventricular Rate 143 BPM    Atrial Rate 143 BPM    P-R Interval 114 ms    QRS Duration 74 ms    Q-T Interval 262 ms    QTC Calculation (Bezet) 404 ms    Calculated P Axis 57 degrees    Calculated R Axis 48 degrees    Calculated T Axis 56 degrees    Diagnosis       Sinus tachycardia with fusion complexes  Otherwise normal ECG  When compared with ECG of 23-NOV-2016 15:24,  fusion complexes are now present  Vent.  rate has increased BY  51 BPM  Nonspecific T wave abnormality no longer evident in Inferior leads  Nonspecific T wave abnormality no longer evident in Lateral leads     CBC WITH AUTOMATED DIFF    Collection Time: 01/11/17  5:13 PM   Result Value Ref Range    WBC 14.6 (H) 4.6 - 13.2 K/uL    RBC 4.77 4.20 - 5.30 M/uL    HGB 14.1 12.0 - 16.0 g/dL    HCT 43.6 35.0 - 45.0 %    MCV 91.4 74.0 - 97.0 FL    MCH 29.6 24.0 - 34.0 PG    MCHC 32.3 31.0 - 37.0 g/dL    RDW 14.3 11.6 - 14.5 %    PLATELET 162 361 - 199 K/uL    MPV 11.0 9.2 - 11.8 FL    NEUTROPHILS 78 (H) 40 - 73 %    LYMPHOCYTES 17 (L) 21 - 52 %    MONOCYTES 5 3 - 10 %    EOSINOPHILS 0 0 - 5 %    BASOPHILS 0 0 - 2 %    ABS. NEUTROPHILS 11.3 (H) 1.8 - 8.0 K/UL    ABS. LYMPHOCYTES 2.5 0.9 - 3.6 K/UL    ABS. MONOCYTES 0.8 0.05 - 1.2 K/UL    ABS. EOSINOPHILS 0.0 0.0 - 0.4 K/UL    ABS. BASOPHILS 0.0 0.0 - 0.1 K/UL    DF AUTOMATED     METABOLIC PANEL, COMPREHENSIVE    Collection Time: 01/11/17  5:13 PM   Result Value Ref Range    Sodium 139 136 - 145 mmol/L    Potassium 4.1 3.5 - 5.5 mmol/L    Chloride 101 100 - 108 mmol/L    CO2 29 21 - 32 mmol/L    Anion gap 9 3.0 - 18 mmol/L    Glucose 139 (H) 74 - 99 mg/dL    BUN 16 7.0 - 18 MG/DL    Creatinine 1.52 (H) 0.6 - 1.3 MG/DL    BUN/Creatinine ratio 11 (L) 12 - 20      GFR est AA 44 (L) >60 ml/min/1.73m2    GFR est non-AA 36 (L) >60 ml/min/1.73m2    Calcium 9.2 8.5 - 10.1 MG/DL    Bilirubin, total 0.2 0.2 - 1.0 MG/DL    ALT 25 13 - 56 U/L    AST 9 (L) 15 - 37 U/L    Alk.  phosphatase 122 (H) 45 - 117 U/L    Protein, total 8.2 6.4 - 8.2 g/dL    Albumin 4.2 3.4 - 5.0 g/dL    Globulin 4.0 2.0 - 4.0 g/dL    A-G Ratio 1.1 0.8 - 1.7     CARDIAC PANEL,(CK, CKMB & TROPONIN)    Collection Time: 01/11/17  5:13 PM   Result Value Ref Range    CK 26 26 - 192 U/L    CK - MB <0.5 (L) 0.5 - 3.6 ng/ml    CK-MB Index CANNOT BE CALCULATED 0.0 - 4.0 %    Troponin-I, Qt. <0.02 0.0 - 0.045 NG/ML   MAGNESIUM    Collection Time: 01/11/17  5:13 PM   Result Value Ref Range    Magnesium 3.6 (H) 1.8 - 2.4 mg/dL   TSH 3RD GENERATION    Collection Time: 01/11/17  5:13 PM   Result Value Ref Range    TSH 0.58 0.36 - 3.74 uIU/mL   T4, FREE    Collection Time: 01/11/17  5:13 PM   Result Value Ref Range    T4, Free 1.0 0.7 - 1.5 NG/DL   PRO-BNP    Collection Time: 01/11/17  5:13 PM   Result Value Ref Range    NT pro- 0 - 900 PG/ML   POC G3    Collection Time: 01/11/17  5:50 PM   Result Value Ref Range Device: VENT      FIO2 (POC) 100 %    pH (POC) 7.256 (L) 7.35 - 7.45      pCO2 (POC) 62.6 (H) 35.0 - 45.0 MMHG    pO2 (POC) 387 (H) 80 - 100 MMHG    HCO3 (POC) 27.9 (H) 22 - 26 MMOL/L    sO2 (POC) 100 (H) 92 - 97 %    Base deficit (POC) 1 mmol/L    Mode ASSIST CONTROL      Tidal volume 341 ml    Set Rate 16 bpm    PEEP/CPAP (POC) 5 cmH2O    PIP (POC) 23      Allens test (POC) YES      Inspiratory Time 1 sec    Total resp. rate 19      Site RIGHT RADIAL      Specimen type (POC) ARTERIAL      Performed by Renetta Marino     Volume control plus YES     URINALYSIS W/ RFLX MICROSCOPIC    Collection Time: 01/11/17  6:24 PM   Result Value Ref Range    Color YELLOW      Appearance CLEAR      Specific gravity 1.013 1.005 - 1.030      pH (UA) 6.5 5.0 - 8.0      Protein TRACE (A) NEG mg/dL    Glucose NEGATIVE  NEG mg/dL    Ketone NEGATIVE  NEG mg/dL    Bilirubin NEGATIVE  NEG      Blood NEGATIVE  NEG      Urobilinogen 0.2 0.2 - 1.0 EU/dL    Nitrites NEGATIVE  NEG      Leukocyte Esterase NEGATIVE  NEG     DRUG SCREEN, URINE    Collection Time: 01/11/17  6:24 PM   Result Value Ref Range    BENZODIAZEPINE NEGATIVE  NEG      BARBITURATES NEGATIVE  NEG      THC (TH-CANNABINOL) NEGATIVE  NEG      OPIATES NEGATIVE  NEG      PCP(PHENCYCLIDINE) NEGATIVE  NEG      COCAINE NEGATIVE  NEG      AMPHETAMINE NEGATIVE  NEG      METHADONE NEGATIVE  NEG      HDSCOM (NOTE)    HCG URINE, QL    Collection Time: 01/11/17  6:24 PM   Result Value Ref Range    HCG urine, Ql. NEGATIVE  NEG     URINE MICROSCOPIC ONLY    Collection Time: 01/11/17  6:24 PM   Result Value Ref Range    WBC NONE 0 - 4 /hpf    RBC NONE 0 - 5 /hpf    Epithelial cells FEW 0 - 5 /lpf    Bacteria FEW (A) NEG /hpf       EKG interpretation by ED Physician:  6:11 PM. Sinus tachycardia with a rate of 143 bpm. Poor baseline, but no acute ischemic changes. X-Ray, CT or other radiology findings or impressions:  XR CHEST PORT   Final Result   IMPRESSION (per radiology):     1. Low-lying ET tube. Recommend retraction by about 3 cm. 2. Subsegmental atelectasis in the left lower lobe. Pulmonary vascular  prominence may be chronic. Stable cardiomegaly. Progress notes, Consult notes or additional Procedure notes:   5:50 PM. Consult:  Discussed care with Dr. Nazario Hickey, intensivist. Standard discussion; including history of patients chief complaint, available diagnostic results, and treatment course. He agrees with ICU admission. Consult:  Discussed care with Dr. Karl Connolly, Hospitalist. Standard discussion; including history of patients chief complaint, available diagnostic results, and treatment course. Agrees to admit.   7:28 PM     Reevaluation of patient:   Still hypertensive and tachycardic, ? ?cocaine abuse. O2 sats stable on vent. Will admit to ICU. Disposition:  Diagnosis:   1. Acute respiratory failure with hypercapnia (HCC)    2. Substance abuse        Disposition: Admit    Follow-up Information     None            Patient's Medications   Start Taking    No medications on file   Continue Taking    ALBUTEROL (VENTOLIN HFA) 90 MCG/ACTUATION INHALER    Take 2 Puffs by inhalation every six (6) hours as needed for Wheezing or Shortness of Breath. ALBUTEROL SULFATE (PROVENTIL;VENTOLIN) 2.5 MG/0.5 ML NEBU NEBULIZER SOLUTION    One nebulizer treatment every 6 hours while awake x 5 days  then every 6 hours as needed for shortness of breath and/or wheezing. ALPRAZOLAM (XANAX) 0.25 MG TABLET    Take 1 Tab by mouth daily. Max Daily Amount: 0.25 mg. Indications: ANXIETY WITH DEPRESSION    AMITRIPTYLINE (ELAVIL) 25 MG TABLET    Take 1 Tab by mouth nightly. AMLODIPINE (NORVASC) 5 MG TABLET    Take 1 Tab by mouth daily. FAMOTIDINE (PEPCID) 20 MG TABLET    Take 1 Tab by mouth daily. FLUOXETINE (PROZAC) 20 MG CAPSULE    Take 3 Caps by mouth daily. FLUTICASONE-SALMETEROL (ADVAIR DISKUS) 250-50 MCG/DOSE DISKUS INHALER    Take 1 Puff by inhalation every twelve (12) hours. GUAIFENESIN SR (MUCINEX) 600 MG SR TABLET    Take 1 Tab by mouth two (2) times a day. LISINOPRIL (PRINIVIL, ZESTRIL) 20 MG TABLET    Take 1 Tab by mouth daily. MONTELUKAST (SINGULAIR) 10 MG TABLET    Take 1 Tab by mouth nightly. OXYCODONE-ACETAMINOPHEN (PERCOCET 10)  MG PER TABLET    Take 1 Tab by mouth every four (4) hours as needed. Max Daily Amount: 6 Tabs. POLYETHYLENE GLYCOL (MIRALAX) 17 GRAM PACKET    Take 1 Packet by mouth daily. PREDNISONE (DELTASONE) 20 MG TABLET    40 mg daily x 3, 30 mg daily x 3, 20 mg daily x 3, 10 mg daily x 3 then dc, quantity to fill    SENNA-DOCUSATE (PERICOLACE) 8.6-50 MG PER TABLET    Take 2 Tabs by mouth nightly. HOLD for loose stool. SOLIFENACIN (VESICARE) 5 MG TABLET    Take 1 Tab by mouth daily. TIOTROPIUM (SPIRIVA) 18 MCG INHALATION CAPSULE    Take 1 Cap by inhalation daily. These Medications have changed    No medications on file   Stop Taking    No medications on file     Scribe Attestation:   I, Katie Jc, am scribing for and in the presence of Anjelica Carter MD January 11, 2017 at 5:09 PM     Signed by: Cherie Gan, 01/11/17, 5:09 PM     Physician Attestation:   I personally performed the services described in this documentation, reviewed and edited the documentation which was dictated to the scribe in my presence, and it accurately records my words and actions.  Anjelica Carter MD  January 11, 2017 at 7:42 PM

## 2017-01-11 NOTE — ED TRIAGE NOTES
Pt brought in by EMS for respiratory distress pt brought with cpap, mag sulfate 2 m, 125mg solumedrol, pt tried using albuterol inhalor with no relief , pt aao*3, short of breath CP shallow respirations

## 2017-01-12 ENCOUNTER — APPOINTMENT (OUTPATIENT)
Dept: CT IMAGING | Age: 52
DRG: 133 | End: 2017-01-12
Attending: PHYSICIAN ASSISTANT
Payer: MEDICAID

## 2017-01-12 ENCOUNTER — APPOINTMENT (OUTPATIENT)
Dept: GENERAL RADIOLOGY | Age: 52
DRG: 133 | End: 2017-01-12
Attending: PHYSICIAN ASSISTANT
Payer: MEDICAID

## 2017-01-12 LAB
ALBUMIN SERPL BCP-MCNC: 3.4 G/DL (ref 3.4–5)
ALBUMIN/GLOB SERPL: 1.1 {RATIO} (ref 0.8–1.7)
ALP SERPL-CCNC: 109 U/L (ref 45–117)
ALT SERPL-CCNC: 19 U/L (ref 13–56)
ANION GAP BLD CALC-SCNC: 16 MMOL/L (ref 3–18)
ARTERIAL PATENCY WRIST A: ABNORMAL
ARTERIAL PATENCY WRIST A: YES
AST SERPL W P-5'-P-CCNC: 8 U/L (ref 15–37)
ATRIAL RATE: 143 BPM
BASE DEFICIT BLD-SCNC: 4 MMOL/L
BASE DEFICIT BLD-SCNC: 6 MMOL/L
BASOPHILS # BLD AUTO: 0 K/UL (ref 0–0.1)
BASOPHILS # BLD: 0 % (ref 0–2)
BDY SITE: ABNORMAL
BDY SITE: ABNORMAL
BILIRUB DIRECT SERPL-MCNC: <0.1 MG/DL (ref 0–0.2)
BILIRUB SERPL-MCNC: <0.1 MG/DL (ref 0.2–1)
BUN SERPL-MCNC: 14 MG/DL (ref 7–18)
BUN/CREAT SERPL: 10 (ref 12–20)
CALCIUM SERPL-MCNC: 8.3 MG/DL (ref 8.5–10.1)
CALCULATED P AXIS, ECG09: 57 DEGREES
CALCULATED R AXIS, ECG10: 48 DEGREES
CALCULATED T AXIS, ECG11: 56 DEGREES
CHLORIDE SERPL-SCNC: 104 MMOL/L (ref 100–108)
CK MB CFR SERPL CALC: ABNORMAL % (ref 0–4)
CK MB SERPL-MCNC: <0.5 NG/ML (ref 0.5–3.6)
CK SERPL-CCNC: 30 U/L (ref 26–192)
CO2 SERPL-SCNC: 22 MMOL/L (ref 21–32)
CREAT SERPL-MCNC: 1.36 MG/DL (ref 0.6–1.3)
DIAGNOSIS, 93000: NORMAL
DIFFERENTIAL METHOD BLD: ABNORMAL
EOSINOPHIL # BLD: 0 K/UL (ref 0–0.4)
EOSINOPHIL NFR BLD: 0 % (ref 0–5)
ERYTHROCYTE [DISTWIDTH] IN BLOOD BY AUTOMATED COUNT: 14.4 % (ref 11.6–14.5)
GAS FLOW.O2 O2 DELIVERY SYS: ABNORMAL L/MIN
GAS FLOW.O2 O2 DELIVERY SYS: ABNORMAL L/MIN
GAS FLOW.O2 SETTING OXYMISER: 22 BPM
GLOBULIN SER CALC-MCNC: 3.2 G/DL (ref 2–4)
GLUCOSE BLD STRIP.AUTO-MCNC: 167 MG/DL (ref 70–110)
GLUCOSE BLD STRIP.AUTO-MCNC: 182 MG/DL (ref 70–110)
GLUCOSE BLD STRIP.AUTO-MCNC: 197 MG/DL (ref 70–110)
GLUCOSE BLD STRIP.AUTO-MCNC: 204 MG/DL (ref 70–110)
GLUCOSE BLD STRIP.AUTO-MCNC: 220 MG/DL (ref 70–110)
GLUCOSE SERPL-MCNC: 201 MG/DL (ref 74–99)
HCO3 BLD-SCNC: 19.5 MMOL/L (ref 22–26)
HCO3 BLD-SCNC: 21 MMOL/L (ref 22–26)
HCT VFR BLD AUTO: 38.7 % (ref 35–45)
HGB BLD-MCNC: 12.6 G/DL (ref 12–16)
LACTATE BLD-SCNC: 5.8 MMOL/L (ref 0.4–2)
LACTATE BLD-SCNC: 7.2 MMOL/L (ref 0.4–2)
LACTATE SERPL-SCNC: 4.4 MMOL/L (ref 0.4–2)
LYMPHOCYTES # BLD AUTO: 6 % (ref 21–52)
LYMPHOCYTES # BLD: 0.8 K/UL (ref 0.9–3.6)
MAGNESIUM SERPL-MCNC: 2.3 MG/DL (ref 1.8–2.4)
MCH RBC QN AUTO: 29.6 PG (ref 24–34)
MCHC RBC AUTO-ENTMCNC: 32.6 G/DL (ref 31–37)
MCV RBC AUTO: 90.8 FL (ref 74–97)
MONOCYTES # BLD: 0.2 K/UL (ref 0.05–1.2)
MONOCYTES NFR BLD AUTO: 1 % (ref 3–10)
NEUTS SEG # BLD: 11.3 K/UL (ref 1.8–8)
NEUTS SEG NFR BLD AUTO: 93 % (ref 40–73)
O2/TOTAL GAS SETTING VFR VENT: 0.21 %
O2/TOTAL GAS SETTING VFR VENT: 28 %
P-R INTERVAL, ECG05: 114 MS
PCO2 BLD: 36.5 MMHG (ref 35–45)
PCO2 BLD: 38 MMHG (ref 35–45)
PEEP RESPIRATORY: 11 CMH2O
PH BLD: 7.32 [PH] (ref 7.35–7.45)
PH BLD: 7.37 [PH] (ref 7.35–7.45)
PLATELET # BLD AUTO: 298 K/UL (ref 135–420)
PMV BLD AUTO: 10.5 FL (ref 9.2–11.8)
PO2 BLD: 108 MMHG (ref 80–100)
PO2 BLD: 76 MMHG (ref 80–100)
POTASSIUM SERPL-SCNC: 3.3 MMOL/L (ref 3.5–5.5)
POTASSIUM SERPL-SCNC: 4.2 MMOL/L (ref 3.5–5.5)
PROT SERPL-MCNC: 6.6 G/DL (ref 6.4–8.2)
Q-T INTERVAL, ECG07: 262 MS
QRS DURATION, ECG06: 74 MS
QTC CALCULATION (BEZET), ECG08: 404 MS
RBC # BLD AUTO: 4.26 M/UL (ref 4.2–5.3)
SAO2 % BLD: 95 % (ref 92–97)
SAO2 % BLD: 98 % (ref 92–97)
SERVICE CMNT-IMP: ABNORMAL
SERVICE CMNT-IMP: ABNORMAL
SODIUM SERPL-SCNC: 142 MMOL/L (ref 136–145)
SPECIMEN TYPE: ABNORMAL
SPECIMEN TYPE: ABNORMAL
TOTAL RESP. RATE, ITRR: 19
TOTAL RESP. RATE, ITRR: 28
TROPONIN I SERPL-MCNC: <0.02 NG/ML (ref 0–0.04)
VENTILATION MODE VENT: ABNORMAL
VENTRICULAR RATE, ECG03: 143 BPM
VOLUME CONTROL PLUS IVLCP: YES
VT SETTING VENT: 460 ML
WBC # BLD AUTO: 12.3 K/UL (ref 4.6–13.2)

## 2017-01-12 PROCEDURE — 74011250637 HC RX REV CODE- 250/637: Performed by: INTERNAL MEDICINE

## 2017-01-12 PROCEDURE — 77030011256 HC DRSG MEPILEX <16IN NO BORD MOLN -A

## 2017-01-12 PROCEDURE — 80076 HEPATIC FUNCTION PANEL: CPT | Performed by: PHYSICIAN ASSISTANT

## 2017-01-12 PROCEDURE — 74011000258 HC RX REV CODE- 258: Performed by: INTERNAL MEDICINE

## 2017-01-12 PROCEDURE — 82803 BLOOD GASES ANY COMBINATION: CPT

## 2017-01-12 PROCEDURE — 74011000250 HC RX REV CODE- 250: Performed by: INTERNAL MEDICINE

## 2017-01-12 PROCEDURE — 84132 ASSAY OF SERUM POTASSIUM: CPT | Performed by: INTERNAL MEDICINE

## 2017-01-12 PROCEDURE — 71010 XR CHEST PORT: CPT

## 2017-01-12 PROCEDURE — 83605 ASSAY OF LACTIC ACID: CPT

## 2017-01-12 PROCEDURE — 77030013140 HC MSK NEB VYRM -A

## 2017-01-12 PROCEDURE — 74011000258 HC RX REV CODE- 258: Performed by: PHYSICIAN ASSISTANT

## 2017-01-12 PROCEDURE — 65660000000 HC RM CCU STEPDOWN

## 2017-01-12 PROCEDURE — 74011636637 HC RX REV CODE- 636/637: Performed by: PHYSICIAN ASSISTANT

## 2017-01-12 PROCEDURE — 74011000250 HC RX REV CODE- 250: Performed by: PHYSICIAN ASSISTANT

## 2017-01-12 PROCEDURE — 82550 ASSAY OF CK (CPK): CPT | Performed by: INTERNAL MEDICINE

## 2017-01-12 PROCEDURE — C9113 INJ PANTOPRAZOLE SODIUM, VIA: HCPCS | Performed by: PHYSICIAN ASSISTANT

## 2017-01-12 PROCEDURE — 83605 ASSAY OF LACTIC ACID: CPT | Performed by: INTERNAL MEDICINE

## 2017-01-12 PROCEDURE — 74011250636 HC RX REV CODE- 250/636: Performed by: PHYSICIAN ASSISTANT

## 2017-01-12 PROCEDURE — 85025 COMPLETE CBC W/AUTO DIFF WBC: CPT | Performed by: INTERNAL MEDICINE

## 2017-01-12 PROCEDURE — 82962 GLUCOSE BLOOD TEST: CPT

## 2017-01-12 PROCEDURE — 77010033678 HC OXYGEN DAILY

## 2017-01-12 PROCEDURE — 74011250637 HC RX REV CODE- 250/637: Performed by: PHYSICIAN ASSISTANT

## 2017-01-12 PROCEDURE — 94640 AIRWAY INHALATION TREATMENT: CPT

## 2017-01-12 PROCEDURE — 74011250636 HC RX REV CODE- 250/636: Performed by: EMERGENCY MEDICINE

## 2017-01-12 PROCEDURE — 74011250636 HC RX REV CODE- 250/636: Performed by: INTERNAL MEDICINE

## 2017-01-12 PROCEDURE — 83735 ASSAY OF MAGNESIUM: CPT | Performed by: INTERNAL MEDICINE

## 2017-01-12 PROCEDURE — 94660 CPAP INITIATION&MGMT: CPT

## 2017-01-12 PROCEDURE — 94003 VENT MGMT INPAT SUBQ DAY: CPT

## 2017-01-12 PROCEDURE — 70450 CT HEAD/BRAIN W/O DYE: CPT

## 2017-01-12 PROCEDURE — 36600 WITHDRAWAL OF ARTERIAL BLOOD: CPT

## 2017-01-12 PROCEDURE — 36415 COLL VENOUS BLD VENIPUNCTURE: CPT | Performed by: PHYSICIAN ASSISTANT

## 2017-01-12 RX ORDER — INSULIN LISPRO 100 [IU]/ML
INJECTION, SOLUTION INTRAVENOUS; SUBCUTANEOUS
Status: DISCONTINUED | OUTPATIENT
Start: 2017-01-12 | End: 2017-01-14 | Stop reason: HOSPADM

## 2017-01-12 RX ORDER — POTASSIUM CHLORIDE 20 MEQ/1
40 TABLET, EXTENDED RELEASE ORAL ONCE
Status: DISCONTINUED | OUTPATIENT
Start: 2017-01-12 | End: 2017-01-12

## 2017-01-12 RX ORDER — AMLODIPINE BESYLATE 5 MG/1
5 TABLET ORAL DAILY
Status: DISCONTINUED | OUTPATIENT
Start: 2017-01-13 | End: 2017-01-14 | Stop reason: HOSPADM

## 2017-01-12 RX ORDER — POTASSIUM CHLORIDE 20 MEQ/1
20 TABLET, EXTENDED RELEASE ORAL
Status: COMPLETED | OUTPATIENT
Start: 2017-01-12 | End: 2017-01-12

## 2017-01-12 RX ORDER — AZITHROMYCIN 250 MG/1
500 TABLET, FILM COATED ORAL ONCE
Status: COMPLETED | OUTPATIENT
Start: 2017-01-12 | End: 2017-01-12

## 2017-01-12 RX ORDER — POTASSIUM CHLORIDE 7.45 MG/ML
10 INJECTION INTRAVENOUS
Status: DISCONTINUED | OUTPATIENT
Start: 2017-01-12 | End: 2017-01-12

## 2017-01-12 RX ORDER — POTASSIUM CHLORIDE 20 MEQ/1
20 TABLET, EXTENDED RELEASE ORAL ONCE
Status: COMPLETED | OUTPATIENT
Start: 2017-01-12 | End: 2017-01-12

## 2017-01-12 RX ORDER — OXYCODONE AND ACETAMINOPHEN 5; 325 MG/1; MG/1
1 TABLET ORAL
Status: DISCONTINUED | OUTPATIENT
Start: 2017-01-12 | End: 2017-01-13

## 2017-01-12 RX ORDER — AZITHROMYCIN 250 MG/1
250 TABLET, FILM COATED ORAL DAILY
Status: DISCONTINUED | OUTPATIENT
Start: 2017-01-13 | End: 2017-01-13

## 2017-01-12 RX ORDER — FAMOTIDINE 20 MG/1
20 TABLET, FILM COATED ORAL 2 TIMES DAILY
Status: DISCONTINUED | OUTPATIENT
Start: 2017-01-12 | End: 2017-01-14 | Stop reason: HOSPADM

## 2017-01-12 RX ORDER — FLUOXETINE HYDROCHLORIDE 20 MG/1
20 CAPSULE ORAL DAILY
Status: DISCONTINUED | OUTPATIENT
Start: 2017-01-13 | End: 2017-01-14 | Stop reason: HOSPADM

## 2017-01-12 RX ORDER — BUDESONIDE AND FORMOTEROL FUMARATE DIHYDRATE 160; 4.5 UG/1; UG/1
2 AEROSOL RESPIRATORY (INHALATION)
Status: DISCONTINUED | OUTPATIENT
Start: 2017-01-12 | End: 2017-01-14 | Stop reason: HOSPADM

## 2017-01-12 RX ORDER — BUDESONIDE 0.5 MG/2ML
500 INHALANT ORAL
Status: DISCONTINUED | OUTPATIENT
Start: 2017-01-12 | End: 2017-01-12

## 2017-01-12 RX ORDER — POLYETHYLENE GLYCOL 3350 17 G/17G
17 POWDER, FOR SOLUTION ORAL DAILY
Status: DISCONTINUED | OUTPATIENT
Start: 2017-01-13 | End: 2017-01-14 | Stop reason: HOSPADM

## 2017-01-12 RX ORDER — ACETAMINOPHEN 325 MG/1
650 TABLET ORAL
Status: DISCONTINUED | OUTPATIENT
Start: 2017-01-12 | End: 2017-01-14 | Stop reason: HOSPADM

## 2017-01-12 RX ORDER — IPRATROPIUM BROMIDE AND ALBUTEROL SULFATE 2.5; .5 MG/3ML; MG/3ML
3 SOLUTION RESPIRATORY (INHALATION)
Status: DISCONTINUED | OUTPATIENT
Start: 2017-01-13 | End: 2017-01-13

## 2017-01-12 RX ORDER — SODIUM CHLORIDE 450 MG/100ML
50 INJECTION, SOLUTION INTRAVENOUS CONTINUOUS
Status: DISCONTINUED | OUTPATIENT
Start: 2017-01-12 | End: 2017-01-12

## 2017-01-12 RX ORDER — BUDESONIDE AND FORMOTEROL FUMARATE DIHYDRATE 160; 4.5 UG/1; UG/1
2 AEROSOL RESPIRATORY (INHALATION)
Status: DISCONTINUED | OUTPATIENT
Start: 2017-01-12 | End: 2017-01-12

## 2017-01-12 RX ADMIN — PIPERACILLIN SODIUM AND TAZOBACTAM SODIUM 4.5 G: 4; .5 INJECTION, POWDER, LYOPHILIZED, FOR SOLUTION INTRAVENOUS at 04:08

## 2017-01-12 RX ADMIN — INSULIN LISPRO 4 UNITS: 100 INJECTION, SOLUTION INTRAVENOUS; SUBCUTANEOUS at 16:51

## 2017-01-12 RX ADMIN — INSULIN LISPRO 2 UNITS: 100 INJECTION, SOLUTION INTRAVENOUS; SUBCUTANEOUS at 07:56

## 2017-01-12 RX ADMIN — IPRATROPIUM BROMIDE AND ALBUTEROL SULFATE 3 ML: 2.5; .5 SOLUTION RESPIRATORY (INHALATION) at 00:35

## 2017-01-12 RX ADMIN — HYDRALAZINE HYDROCHLORIDE 20 MG: 20 INJECTION INTRAMUSCULAR; INTRAVENOUS at 01:28

## 2017-01-12 RX ADMIN — IPRATROPIUM BROMIDE AND ALBUTEROL SULFATE 3 ML: 2.5; .5 SOLUTION RESPIRATORY (INHALATION) at 20:36

## 2017-01-12 RX ADMIN — IPRATROPIUM BROMIDE AND ALBUTEROL SULFATE 3 ML: .5; 3 SOLUTION RESPIRATORY (INHALATION) at 00:35

## 2017-01-12 RX ADMIN — METHYLPREDNISOLONE SODIUM SUCCINATE 60 MG: 40 INJECTION, POWDER, FOR SOLUTION INTRAMUSCULAR; INTRAVENOUS at 10:07

## 2017-01-12 RX ADMIN — INSULIN LISPRO 2 UNITS: 100 INJECTION, SOLUTION INTRAVENOUS; SUBCUTANEOUS at 01:07

## 2017-01-12 RX ADMIN — SODIUM CHLORIDE 40 MG: 9 INJECTION INTRAMUSCULAR; INTRAVENOUS; SUBCUTANEOUS at 08:27

## 2017-01-12 RX ADMIN — POTASSIUM CHLORIDE 20 MEQ: 1500 TABLET, EXTENDED RELEASE ORAL at 11:02

## 2017-01-12 RX ADMIN — FENTANYL CITRATE 25 MCG: 50 INJECTION INTRAMUSCULAR; INTRAVENOUS at 01:28

## 2017-01-12 RX ADMIN — FENTANYL CITRATE 25 MCG: 50 INJECTION INTRAMUSCULAR; INTRAVENOUS at 04:08

## 2017-01-12 RX ADMIN — AZITHROMYCIN 500 MG: 250 TABLET, FILM COATED ORAL at 15:02

## 2017-01-12 RX ADMIN — FAMOTIDINE 20 MG: 20 TABLET ORAL at 19:49

## 2017-01-12 RX ADMIN — IPRATROPIUM BROMIDE AND ALBUTEROL SULFATE 3 ML: .5; 3 SOLUTION RESPIRATORY (INHALATION) at 00:36

## 2017-01-12 RX ADMIN — OXYCODONE HYDROCHLORIDE AND ACETAMINOPHEN 1 TABLET: 5; 325 TABLET ORAL at 19:48

## 2017-01-12 RX ADMIN — POTASSIUM CHLORIDE 20 MEQ: 1500 TABLET, EXTENDED RELEASE ORAL at 15:04

## 2017-01-12 RX ADMIN — PIPERACILLIN SODIUM AND TAZOBACTAM SODIUM 4.5 G: 4; .5 INJECTION, POWDER, LYOPHILIZED, FOR SOLUTION INTRAVENOUS at 13:00

## 2017-01-12 RX ADMIN — PROPOFOL 40 MCG/KG/MIN: 10 INJECTION, EMULSION INTRAVENOUS at 06:13

## 2017-01-12 RX ADMIN — SODIUM CHLORIDE 50 ML/HR: 450 INJECTION, SOLUTION INTRAVENOUS at 10:08

## 2017-01-12 RX ADMIN — IPRATROPIUM BROMIDE AND ALBUTEROL SULFATE 3 ML: .5; 3 SOLUTION RESPIRATORY (INHALATION) at 23:58

## 2017-01-12 RX ADMIN — BUDESONIDE 500 MCG: 0.5 SUSPENSION RESPIRATORY (INHALATION) at 04:00

## 2017-01-12 RX ADMIN — VANCOMYCIN HYDROCHLORIDE 1500 MG: 10 INJECTION, POWDER, LYOPHILIZED, FOR SOLUTION INTRAVENOUS at 21:17

## 2017-01-12 RX ADMIN — MIDAZOLAM HYDROCHLORIDE 2 MG: 1 INJECTION, SOLUTION INTRAMUSCULAR; INTRAVENOUS at 04:54

## 2017-01-12 RX ADMIN — METHYLPREDNISOLONE SODIUM SUCCINATE 60 MG: 40 INJECTION, POWDER, FOR SOLUTION INTRAMUSCULAR; INTRAVENOUS at 01:03

## 2017-01-12 RX ADMIN — HEPARIN SODIUM 5000 UNITS: 5000 INJECTION, SOLUTION INTRAVENOUS; SUBCUTANEOUS at 04:09

## 2017-01-12 RX ADMIN — IPRATROPIUM BROMIDE AND ALBUTEROL SULFATE 3 ML: 2.5; .5 SOLUTION RESPIRATORY (INHALATION) at 04:08

## 2017-01-12 RX ADMIN — VANCOMYCIN HYDROCHLORIDE 2500 MG: 10 INJECTION, POWDER, LYOPHILIZED, FOR SOLUTION INTRAVENOUS at 00:39

## 2017-01-12 RX ADMIN — METHYLPREDNISOLONE SODIUM SUCCINATE 60 MG: 40 INJECTION, POWDER, FOR SOLUTION INTRAMUSCULAR; INTRAVENOUS at 17:01

## 2017-01-12 RX ADMIN — IPRATROPIUM BROMIDE AND ALBUTEROL SULFATE 3 ML: 2.5; .5 SOLUTION RESPIRATORY (INHALATION) at 15:26

## 2017-01-12 RX ADMIN — INSULIN LISPRO 4 UNITS: 100 INJECTION, SOLUTION INTRAVENOUS; SUBCUTANEOUS at 11:13

## 2017-01-12 RX ADMIN — PIPERACILLIN SODIUM AND TAZOBACTAM SODIUM 4.5 G: 4; .5 INJECTION, POWDER, LYOPHILIZED, FOR SOLUTION INTRAVENOUS at 08:08

## 2017-01-12 RX ADMIN — IPRATROPIUM BROMIDE AND ALBUTEROL SULFATE 3 ML: 2.5; .5 SOLUTION RESPIRATORY (INHALATION) at 12:03

## 2017-01-12 RX ADMIN — PIPERACILLIN SODIUM AND TAZOBACTAM SODIUM 4.5 G: 4; .5 INJECTION, POWDER, LYOPHILIZED, FOR SOLUTION INTRAVENOUS at 19:48

## 2017-01-12 NOTE — PROGRESS NOTES
PCCM Follow up    Pt easily awakened on propofol gtt. Able to follow simple commands. Last night on assessment, pt had pointed to the abdomen and nodded to experiencing pain. Oxygenation good; no auto PEEP. MV settings: R 22  FiO2 28% PEEP 11. Repeat ABG in 1 hour with change in vent settings - R 20  FiO2 35% PEEP 5. No auto-PEEP noted on new vent settings. Attempt SBT if pt's ABG on new vent settings appropriate.     Will pass to day ICU team.    Cash Chaves PA-C

## 2017-01-12 NOTE — CONSULTS
Please see dev SPRAGUE note for detail  Agree with her assessment  Chart reviewed. IMPRESSION:      · Acute asthma exacerbation in the setting of known history of severe asthma complicating with chronic bronchitis, MY, morbid obesity and ? diastolic heart failure decompensation. 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 · Hypertension         PLAN:   CVS:BP is elevated make sure no cocaine on board. Echo in past was ok. Cardiac enz  RS:Vent protocol. Solumedrol Bronchodilators Check MG and evaluate aspitation precaution retract ET tube by 2-3 cm   ID:Pan culture ? LLL aspiration add vanco and zosyn  ENDO:SSI check TSH  GI:PPI NPO for now  RENAL:IV fluids Monitor electrolytes  CNS:Get head CT   HEMATOLOGY:monitor H and H  MUSCULOSKELETAL:Monitor CK levels   PAIN AND SEDATION:Versed and fentanyl  ADVANCE DIRECTIVE:Full code  FAMILY DISCUSSION:ICU team trying to inform family  Quality Care: PPI, DVT prophylaxis, HOB elevated, Infection control all reviewed and addressed. · Old records reviewed discussed results and management plan with patient  · Images personally reviewed and results and labs reviewed and discussed with patient. · All medication reviewed and adjusted  Further management depending on test results and work up as outlined above.

## 2017-01-12 NOTE — PROGRESS NOTES
PCCM Update:     Pt states that she is still very SOB when she exerts herself. She was able to ambulate to the bathroom earlier, but said she got dizzy. She has pleural chest pain when she coughs and is requesting percocet. I explained to the pt that percocet could cause her to retain more CO2 and make her breathing worse. She wears oxygen at home and CPAP at night while at home. Pt was extubated earlier to the bipap and has been on 3 L NC for the past few hours. VS T 98.4  /58 RR 20 O2 99% on 3 Liters  Gen: A&O x 4 in NAD. CV: RRR no m,r,g  Resp: some mild increased WOB, symmetric rise and fall of chest. Soft expiratory wheezing throughout lung fields bilaterally. Abdomen: soft, NTTP. BS present  Ext: no edema or cyanosis noted     Assessment/Plan:  Acute Asthma/COPD exacerbation- improving   Hypertension- improved  Obtain repeat ABG   PRN Tylenol for pain  Continue ABX and steroids. Bipap at night. Pt okay to transfer to [facility] on NC if ABG comes back okay. Update @ 1718   ABG 7.36/36.5/76/21 on 3 L NC, Ok to transfer to [facility].          Signed By: Jayant Randolph PA-C     January 12, 2017

## 2017-01-12 NOTE — ROUTINE PROCESS
1755:  Received report from [facility], ICU. Followed SBAR format. 1815:  Pt arrived on floor. Tele box connected, skin assessed. 1945:  Gave report to [place]. Followed SBAR format.

## 2017-01-12 NOTE — ED NOTES
Patient on whening trial, explained to patient the process. She acknowledges understanding with a thumbs up. Will continue to monitor.

## 2017-01-12 NOTE — ED NOTES
Bedside shift change report given to [address]  (oncoming nurse) by Jesus Manuel Arevalo RN  (offgoing nurse). Report included the following information Kardex, ED Summary and MAR. Patient intubated, responds to voice by opening eyes. Patient relays that she wants the tube out. Explained the procedure. Will continue to monitor.

## 2017-01-12 NOTE — INTERDISCIPLINARY ROUNDS
CRITICAL CARE INTERDISCIPLINARY ROUNDS      Patient Information:    Name:   Darshana Carroll    Age:   45 y.o. Admission Date:   1/11/2017    Readmit Risk Assessment Information:      Readmit Risk Tool Support Systems: Child(anjelica), Relationship with Primary Physician Group: Seen at least one time within the past 6 months    Surgery Date:      Day of Stay:     Expected Discharge Date:        Attending Provider:   Hai Gonzalez MD    Surgeon:        Consultant:       Primary Care Provider:   Kishore Sandhu MD    Problem List:     Patient Active Problem List   Diagnosis Code    Asthma exacerbation attacks J45. 0    Status asthmaticus with COPD (chronic obstructive pulmonary disease) ([facility]) J44.9, J45.902    Abnormal CT of the chest R93.8    Shortness of breath R06.02    Asthma J45.909    COPD exacerbation ([facility]) J44.1    Acute on chronic respiratory failure with hypoxemia ([facility]) J96.21    COPD with exacerbation ([facility]) J44.1    Acute exacerbation of chronic obstructive pulmonary disease (COPD) ([facility]) J44.1    Respiratory failure ([facility]) J96.90    Acute encephalopathy G93.40       Principal Problem:  <principal problem not specified>    Procedure:       During rounds the following quality care indicators and evidence based practices were addressed :     DVT Prophylaxis, Pressure Ulcer Prevention, Pain Management, Sepsis resuscitation and management, Nutritional Status, Critical Care Interventions Airways, Drains and Lines and IHI Bundles: Central Line Bundle Followed , Walker Bundle Followed and Vent Bundle Followed, Vent Day 2           Acute MI/PCI:   Not applicable    Heart Failure:    Not applicable    Cardiac Surgery:  Not applicable    SCIP Measures for other Surgeries:   Not applicable    Pneumonia:    Appropriate Antibiotic Selection (ICU versus Non-ICU)    Stroke:  Patient's Personal Risk Factors for Stroke are:   hypertension, family history or hyperlipidemia    NIH Stroke Score       Transfer Level of Care:  Progressing to Transfer    The patient will require the following interventions based on the Readmission Risk Assessment:  Pharmacy evaluation and teaching, Care Management involvement for home health follow up for:  mobility and assistance with ADL's and Spiritual Care evaluation      Discharge Management:  Home    Anticipated Discharge Date:  2days      Interdisciplinary team rounds were held  with the following team membersCare Management, Diabetes Treatment Specialist, Nutrition, Pharmacy, Physician and Clinical Coordinator and the  patient. Plan of care discussed. See clinical pathway and/or care plan for interventions and desired outcomes. Patient extubated gtz discontinued. Patient asking for pain medication Dr to speak with patient.

## 2017-01-12 NOTE — CONSULTS
[facility]  Pulmonary, Critical Care, and Sleep Medicine      Name: Ryan Crystal MRN: [id number]   : 1965 Hospital: [address] [place] [address]    Date: 2017          Critical Care Initial Patient Consult    Requesting MD:   Dr. Margaux Calhoun                                     Reason for CC Consult: Acute Respiratory Failure secondary to COPD exacerbation     IMPRESSION:   · Acute asthma exacerbation in the setting of known history of severe asthma complicating with chronic bronchitis, MY, morbid obesity and ? diastolic heart failure decompensation  · Acute on chronic respiratory failure Intubated in ER, possible aspiration pneumonia with left sided atelectasis on CXR   · Altered mental status- most likely secondary to above  · Acute on chronic hypoxic respiratory failure on home O2/NC @ 2 Lpm,   · MY on CPAP  · Hypertension- improving   · DEMARCUS- most likely prerenal.   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 RECOMMENDATIONS:   · Resp -  Stable on vent, Initial ABG shows respiratory acidosis, Repeat ABG at 2200. VAP Bundle. IV Solumedrol 60 mg q 6 hrs. Duo-nebz and budesonide nebz q 4 hrs. Daily AM ABG and CXR. Aspiration precautions. HOB > 30 degrees. CXR- Subsegmental atelectasis in the left lower lobe. · ID - Afebrile with mild leucocytosis. LA-pending. UC, BC, SC-pending. Start vanc and zosyn. Follow-up Cultures and de-escalate ABX as needed. · CVS - HD stable. Give PRN labetalol for SBP > 160. Hold home BP meds for now. EKG- sinus tachycardiac. Troponin-negative, will repeat in the AM.    · Heme/Onc- H/H stable, no acute issues. Daily CBC. · Metabolic - Monitor and replace electrolytes as needed per protocol. Daily BMP, MG, Phos. · Renal - DEMARCUS, Monitor UOP and CR. Strict I&Os.  Aaron in place. Start NS @ 100 ml/hr. · Endocrine - SSI. BS q 6. No hx of DM. [id number]. TSH-normal.  · Neuro/ Pain/ Sedation - Propofol gtt with PRN fentanyl and versed. Titrate Taye Score -1 to 0. Daily sedation holiday while intubated. Pt came in with AMS and hypertension, CT Head-pending. Nicotine Patch. UDS-negative. · GI - NPO for now. IVF. LFTs-normal  · Prophylaxis - DVT(heparin subQ), GI(Protonix)     Subjective/History: This patient has been seen and evaluated at the request of Dr. Stephanie Romo for Acute Respiratory Failure secondary to COPD exacerbation. Patient is a 45 y.o. female with pmhx of asthma/COPD, cocaine use, heroin use, HTN, and hypothyroidism presented to the [facility] ER via EMS with severe SOB and chest pain. Pt was discharged from [facility] today where she was admitted for a COPD exacerbation for two days and discharged on PO prednisone and nebulizer treatments. Pt was reportedly using her nebulizer at home due to SOB and wheezing. She became increasingly distressed while using her treatment and EMS was called. EMS started patient on CPAP, gave Mg, Steroids, and Albuterol en route. Upon arrival to the ER pt was very altered and barely moving and air. Pt was intubated immediately upon arrival and started on sedation. Pt was hypertensive and tachycardic post intubation, but VS are improving now that the pt is more comfortable. Pt has had over 8 hospital admissions for COPD exacerbation of the past year at multiple hospitals and has a history of drug seeking behavior with many ER visits. Pt is currently ventilated in the ER and will be transferred to the ICU for further care. The patient is critically ill and can not provide additional history due to Ventilated.      Past Medical History   Diagnosis Date    Asthma     Chronic obstructive pulmonary disease ([facility] [address]     Endocrine disease      thyroid issues    Gastrointestinal disorder      \"blockage in my stomach\"    Hypertension       No past surgical history on file. Prior to Admission medications    Medication Sig Start Date End Date Taking? Authorizing Provider   oxyCODONE-acetaminophen (PERCOCET 10)  mg per tablet Take 1 Tab by mouth every four (4) hours as needed. Max Daily Amount: 6 Tabs. 7/13/16   Ayaka Moya MD   predniSONE (DELTASONE) 20 mg tablet 40 mg daily x 3, 30 mg daily x 3, 20 mg daily x 3, 10 mg daily x 3 then dc, quantity to fill 7/13/16   Ayaka Moya MD   guaiFENesin SR (MUCINEX) 600 mg SR tablet Take 1 Tab by mouth two (2) times a day. 6/16/16   Cindi Parks MD   albuterol (VENTOLIN HFA) 90 mcg/actuation inhaler Take 2 Puffs by inhalation every six (6) hours as needed for Wheezing or Shortness of Breath. 5/19/16   Mindy Gomez MD   albuterol sulfate (PROVENTIL;VENTOLIN) 2.5 mg/0.5 mL nebu nebulizer solution One nebulizer treatment every 6 hours while awake x 5 days  then every 6 hours as needed for shortness of breath and/or wheezing. 5/19/16   Mindy Gomez MD   fluticasone-salmeterol (ADVAIR DISKUS) 250-50 mcg/dose diskus inhaler Take 1 Puff by inhalation every twelve (12) hours. 5/19/16   Mindy Gomez MD   lisinopril (PRINIVIL, ZESTRIL) 20 mg tablet Take 1 Tab by mouth daily. 5/19/16   Mindy Gomez MD   amLODIPine (NORVASC) 5 mg tablet Take 1 Tab by mouth daily. 5/19/16   Mindy Gomez MD   tiotropium (SPIRIVA) 18 mcg inhalation capsule Take 1 Cap by inhalation daily. 5/19/16   Mindy Gomez MD   amitriptyline (ELAVIL) 25 mg tablet Take 1 Tab by mouth nightly. 3/8/16   Nu Troy MD   ALPRAZolam Sandiprilyn Libby) 0.25 mg tablet Take 1 Tab by mouth daily. Max Daily Amount: 0.25 mg. Indications: ANXIETY WITH DEPRESSION 3/8/16   Nu Troy MD   famotidine (PEPCID) 20 mg tablet Take 1 Tab by mouth daily. 11/20/15   Tyrell Durbin MD   senna-docusate (PERICOLACE) 8.6-50 mg per tablet Take 2 Tabs by mouth nightly. HOLD for loose stool.  11/20/15   Tyrell Durbin MD   FLUoxetine (PROZAC) 20 mg capsule Take 3 Caps by mouth daily. 11/20/15   Cesra Retana MD   montelukast (SINGULAIR) 10 mg tablet Take 1 Tab by mouth nightly. 11/20/15   Cesar Retana MD   solifenacin (VESICARE) 5 mg tablet Take 1 Tab by mouth daily. 11/20/15   Cesar Retana MD   polyethylene glycol (MIRALAX) 17 gram packet Take 1 Packet by mouth daily. 10/31/15   Cesar Retana MD     Current Facility-Administered Medications   Medication Dose Route Frequency    flumazenil (ROMAZICON) 0.1 mg/mL injection        propofol (DIPRIVAN) infusion  5-50 mcg/kg/min (Order-Specific) IntraVENous TITRATE    rocuronium (ZEMURON) injection 75 mg  75 mg IntraVENous NOW    sodium chloride 0.9 % bolus infusion 1,000 mL  1,000 mL IntraVENous ONCE    chlorhexidine (PERIDEX) 0.12 % mouthwash 10 mL  10 mL Oral Q12H    [START ON 1/12/2017] pantoprazole (PROTONIX) 40 mg in sodium chloride 0.9 % 10 mL injection  40 mg IntraVENous DAILY    heparin (porcine) injection 5,000 Units  5,000 Units SubCUTAneous Q8H    insulin lispro (HUMALOG) injection   SubCUTAneous Q6H    methylPREDNISolone (PF) (SOLU-MEDROL) injection 60 mg  60 mg IntraVENous Q6H    [START ON 1/12/2017] nicotine (NICODERM CQ) 14 mg/24 hr patch 1 Patch  1 Patch TransDERmal DAILY    albuterol-ipratropium (DUO-NEB) 2.5 MG-0.5 MG/3 ML  3 mL Nebulization Q4H RT    budesonide (PULMICORT) 500 mcg/2 ml nebulizer suspension  500 mcg Nebulization Q4H RT    0.9% sodium chloride infusion  100 mL/hr IntraVENous CONTINUOUS    piperacillin-tazobactam (ZOSYN) 3.375 g in 0.9% sodium chloride (MBP/ADV) 100 mL MBP  3.375 g IntraVENous Q6H    vancomycin (VANCOCIN) 2,500 mg in 0.9% sodium chloride 500 mL IVPB  2,500 mg IntraVENous ONCE     No Known Allergies   Social History   Substance Use Topics    Smoking status: Never Smoker    Smokeless tobacco: Never Used    Alcohol use Yes      Comment: socially      No family history on file.      Review of Systems:  Review of systems not obtained due to patient factors. Objective:   Vital Signs:    Visit Vitals    BP (!) 196/123    Pulse (!) 120    Temp 97.7 °F (36.5 °C)    Resp 18    Ht 5' 8\" (1.727 m)    Wt 122.9 kg (270 lb 15.1 oz)    SpO2 (!) 35%    BMI 41.2 kg/m2       O2 Device: Endotracheal tube       Temp (24hrs), Av.7 °F (36.5 °C), Min:97.7 °F (36.5 °C), Max:97.7 °F (36.5 °C)       Intake/Output:   Last shift:         Last 3 shifts:    No intake or output data in the 24 hours ending 17    Ventilator Settings:  Mode Rate Tidal Volume Pressure FiO2 PEEP   Assist control, VC+   500 ml    35 % 5 cm H20     Peak airway pressure: 14 cm H2O    Minute ventilation: 9.33 l/min          Physical Exam:    General:  Ventilated and sedated. Waking up and moving all extremities. Head:  Normocephalic, without obvious abnormality, atraumatic. Eyes:  Conjunctivae/corneas clear. PERRL. Nose: Nares normal. Septum midline. Throat: Lips, mucosa, and tongue normal. Teeth and gums normal.   Neck: Supple, symmetrical, trachea midline. Lungs: On ventilator. Symmetric rise and fall chest, moderate expiratory wheezing throughout lung fields bilaterally. Chest wall:  No tenderness or deformity. Heart:  Tachycardic with regular  rhythm, S1, S2 normal, no murmur, click, rub or gallop. Abdomen:   Soft, non-tender. Bowel sounds normal. No masses,  No organomegaly. Extremities: Extremities normal, atraumatic, no cyanosis or edema. Pulses: 2+ and symmetric all extremities.    Skin: Skin color, texture, turgor normal. No rashes or lesions   Neurologic: Grossly nonfocal       Data:     Recent Results (from the past 24 hour(s))   EKG, 12 LEAD, INITIAL    Collection Time: 17  5:09 PM   Result Value Ref Range    Ventricular Rate 143 BPM    Atrial Rate 143 BPM    P-R Interval 114 ms    QRS Duration 74 ms    Q-T Interval 262 ms    QTC Calculation (Bezet) 404 ms    Calculated P Axis 57 degrees    Calculated R Axis 48 degrees Calculated T Axis 56 degrees    Diagnosis       Sinus tachycardia with fusion complexes  Otherwise normal ECG  When compared with ECG of 23-NOV-2016 15:24,  fusion complexes are now present  Vent. rate has increased BY  51 BPM  Nonspecific T wave abnormality no longer evident in Inferior leads  Nonspecific T wave abnormality no longer evident in Lateral leads     CBC WITH AUTOMATED DIFF    Collection Time: 01/11/17  5:13 PM   Result Value Ref Range    WBC 14.6 (H) 4.6 - 13.2 K/uL    RBC 4.77 4.20 - 5.30 M/uL    HGB 14.1 12.0 - 16.0 g/dL    HCT 43.6 35.0 - 45.0 %    MCV 91.4 74.0 - 97.0 FL    MCH 29.6 24.0 - 34.0 PG    MCHC 32.3 31.0 - 37.0 g/dL    RDW 14.3 11.6 - 14.5 %    PLATELET 864 316 - 285 K/uL    MPV 11.0 9.2 - 11.8 FL    NEUTROPHILS 78 (H) 40 - 73 %    LYMPHOCYTES 17 (L) 21 - 52 %    MONOCYTES 5 3 - 10 %    EOSINOPHILS 0 0 - 5 %    BASOPHILS 0 0 - 2 %    ABS. NEUTROPHILS 11.3 (H) 1.8 - 8.0 K/UL    ABS. LYMPHOCYTES 2.5 0.9 - 3.6 K/UL    ABS. MONOCYTES 0.8 0.05 - 1.2 K/UL    ABS. EOSINOPHILS 0.0 0.0 - 0.4 K/UL    ABS. BASOPHILS 0.0 0.0 - 0.1 K/UL    DF AUTOMATED     METABOLIC PANEL, COMPREHENSIVE    Collection Time: 01/11/17  5:13 PM   Result Value Ref Range    Sodium 139 136 - 145 mmol/L    Potassium 4.1 3.5 - 5.5 mmol/L    Chloride 101 100 - 108 mmol/L    CO2 29 21 - 32 mmol/L    Anion gap 9 3.0 - 18 mmol/L    Glucose 139 (H) 74 - 99 mg/dL    BUN 16 7.0 - 18 MG/DL    Creatinine 1.52 (H) 0.6 - 1.3 MG/DL    BUN/Creatinine ratio 11 (L) 12 - 20      GFR est AA 44 (L) >60 ml/min/1.73m2    GFR est non-AA 36 (L) >60 ml/min/1.73m2    Calcium 9.2 8.5 - 10.1 MG/DL    Bilirubin, total 0.2 0.2 - 1.0 MG/DL    ALT 25 13 - 56 U/L    AST 9 (L) 15 - 37 U/L    Alk.  phosphatase 122 (H) 45 - 117 U/L    Protein, total 8.2 6.4 - 8.2 g/dL    Albumin 4.2 3.4 - 5.0 g/dL    Globulin 4.0 2.0 - 4.0 g/dL    A-G Ratio 1.1 0.8 - 1.7     CARDIAC PANEL,(CK, CKMB & TROPONIN)    Collection Time: 01/11/17  5:13 PM   Result Value Ref Range CK 26 26 - 192 U/L    CK - MB <0.5 (L) 0.5 - 3.6 ng/ml    CK-MB Index CANNOT BE CALCULATED 0.0 - 4.0 %    Troponin-I, Qt. <0.02 0.0 - 0.045 NG/ML   MAGNESIUM    Collection Time: 01/11/17  5:13 PM   Result Value Ref Range    Magnesium 3.6 (H) 1.8 - 2.4 mg/dL   TSH 3RD GENERATION    Collection Time: 01/11/17  5:13 PM   Result Value Ref Range    TSH 0.58 0.36 - 3.74 uIU/mL   T4, FREE    Collection Time: 01/11/17  5:13 PM   Result Value Ref Range    T4, Free 1.0 0.7 - 1.5 NG/DL   PRO-BNP    Collection Time: 01/11/17  5:13 PM   Result Value Ref Range    NT pro- 0 - 900 PG/ML   PROTHROMBIN TIME + INR    Collection Time: 01/11/17  5:13 PM   Result Value Ref Range    Prothrombin time 12.3 11.5 - 15.2 sec    INR 0.9 0.8 - 1.2     HEMOGLOBIN A1C WITH EAG    Collection Time: 01/11/17  5:13 PM   Result Value Ref Range    Hemoglobin A1c 5.1 4.2 - 5.6 %    Est. average glucose 100 mg/dL   POC G3    Collection Time: 01/11/17  5:50 PM   Result Value Ref Range    Device: VENT      FIO2 (POC) 100 %    pH (POC) 7.256 (L) 7.35 - 7.45      pCO2 (POC) 62.6 (H) 35.0 - 45.0 MMHG    pO2 (POC) 387 (H) 80 - 100 MMHG    HCO3 (POC) 27.9 (H) 22 - 26 MMOL/L    sO2 (POC) 100 (H) 92 - 97 %    Base deficit (POC) 1 mmol/L    Mode ASSIST CONTROL      Tidal volume 341 ml    Set Rate 16 bpm    PEEP/CPAP (POC) 5 cmH2O    PIP (POC) 23      Allens test (POC) YES      Inspiratory Time 1 sec    Total resp.  rate 19      Site RIGHT RADIAL      Specimen type (POC) ARTERIAL      Performed by Elizabeth Buckner     Volume control plus YES     URINALYSIS W/ RFLX MICROSCOPIC    Collection Time: 01/11/17  6:24 PM   Result Value Ref Range    Color YELLOW      Appearance CLEAR      Specific gravity 1.013 1.005 - 1.030      pH (UA) 6.5 5.0 - 8.0      Protein TRACE (A) NEG mg/dL    Glucose NEGATIVE  NEG mg/dL    Ketone NEGATIVE  NEG mg/dL    Bilirubin NEGATIVE  NEG      Blood NEGATIVE  NEG      Urobilinogen 0.2 0.2 - 1.0 EU/dL    Nitrites NEGATIVE  NEG Leukocyte Esterase NEGATIVE  NEG     DRUG SCREEN, URINE    Collection Time: 01/11/17  6:24 PM   Result Value Ref Range    BENZODIAZEPINE NEGATIVE  NEG      BARBITURATES NEGATIVE  NEG      THC (TH-CANNABINOL) NEGATIVE  NEG      OPIATES NEGATIVE  NEG      PCP(PHENCYCLIDINE) NEGATIVE  NEG      COCAINE NEGATIVE  NEG      AMPHETAMINE NEGATIVE  NEG      METHADONE NEGATIVE  NEG      HDSCOM (NOTE)    HCG URINE, QL    Collection Time: 01/11/17  6:24 PM   Result Value Ref Range    HCG urine, Ql. NEGATIVE  NEG     URINE MICROSCOPIC ONLY    Collection Time: 01/11/17  6:24 PM   Result Value Ref Range    WBC NONE 0 - 4 /hpf    RBC NONE 0 - 5 /hpf    Epithelial cells FEW 0 - 5 /lpf    Bacteria FEW (A) NEG /hpf             Telemetry: sinus tachy     Imaging:  I have personally reviewed the patients radiographs and have reviewed the reports:    CXR 01/11/17:  1. Low-lying ET tube. Recommend retraction by about 3 cm. 2. Subsegmental atelectasis in the left lower lobe. Pulmonary vascular  prominence may be chronic. Stable cardiomegaly. ABD 01/11/17: Enteric tube should be advanced by approximately 7 cm to ensure side hole  placement in the stomach. Endotracheal tube as described above. Left basilar  airspace opacity could be due to atelectasis or pneumonia.     Samra Deng PA-C

## 2017-01-12 NOTE — PROGRESS NOTES
TRANSFER - OUT REPORT:    Verbal report given to Tico Alejandro RN(name) on Antoinette Clark  being transferred to  [place] [id number](unit) for routine progression of care       Report consisted of patients Situation, Background, Assessment and   Recommendations(SBAR). Information from the following report(s) SBAR, Kardex and Recent Results was reviewed with the receiving nurse. Lines:   Peripheral IV 17 Right Antecubital (Active)   Site Assessment Clean, dry, & intact 2017  9:32 AM   Phlebitis Assessment 0 2017  9:32 AM   Infiltration Assessment 0 2017  9:32 AM   Dressing Status Clean, dry, & intact 2017  9:32 AM   Dressing Type Transparent 2017  9:32 AM   Hub Color/Line Status Pink;Flushed 2017  9:32 AM   Alcohol Cap Used No 2017  9:32 AM       Peripheral IV 17 Right Arm (Active)        Opportunity for questions and clarification was provided.       Patient transported with:   Registered Nurse

## 2017-01-12 NOTE — ED NOTES
Bedside shift change report given to [facility] and Kenton CORTEZ (oncoming nurse) by Ana Paula Cabral (offgoing nurse). Report included the following information SBAR, ED Summary, Intake/Output and MAR.

## 2017-01-12 NOTE — PROGRESS NOTES
[facility]   Pulmonary, Critical Care, and Sleep Medicine     ICU Patient Progress Note    Name: Calvin Muller   : 1965   MRN: [id number]   Date: 2017    [x]I have reviewed the flowsheet and previous days notes. Events, vitals, medications and notes from last 24 hours reviewed. Care plan discussed on multidisciplinary rounds. HPI:  This patient has been seen and evaluated at the request of Dr. Avelino Conley for Acute Respiratory Failure secondary to COPD exacerbation. Patient is a 45 y.o. female with pmhx of asthma/COPD, cocaine use, heroin use, HTN, and hypothyroidism presented to the [facility] ER via EMS with severe SOB and chest pain. Pt was discharged from [facility] today where she was admitted for a COPD exacerbation for two days and discharged on PO prednisone and nebulizer treatments. Pt was reportedly using her nebulizer at home due to SOB and wheezing. She became increasingly distressed while using her treatment and EMS was called. EMS started patient on CPAP, gave Mg, Steroids, and Albuterol en route. Upon arrival to the ER pt was very altered and barely moving and air. Pt was intubated immediately upon arrival and started on sedation. Pt was hypertensive and tachycardic post intubation, but VS are improving now that the pt is more comfortable. Pt has had over 8 hospital admissions for COPD exacerbation of the past year at multiple hospitals and has a history of drug seeking behavior with many ER visits. Pt is currently ventilated in the ER and will be transferred to the ICU for further care.      The patient is critically ill and can not provide additional history due to Ventilated. Subjective:    AutoPEEPing last night so vent settings changed. This AM remains on propofol but much more awake and alert. Vent changed to R20 , FIO2 35% and PEEP 5 this AM.   Awake and gesturing. Nods to pain in chest and abdomen.    Extubated in ED and doing well from resp standpoint. Has some pain in the L chest.   She tells me she takes Advair, albuterol PRN and chronic 20mg prednisone at home. Has been on Xolair in the past but not for a few years.      Allergy:  No Known Allergies     ROS: Constitutional: negative for fevers, chills and sweats  Eyes: negative for visual disturbance  Respiratory: positive for pleurisy/chest pain, asthma or wheezing, negative for sputum  Cardiovascular: positive for chest pressure/discomfort, negative for palpitations, syncope, orthopnea  Gastrointestinal: negative for dysphagia, nausea and vomiting    []The patient is unable to give any meaningful history or review of systems because the patient is:  []Intubated []Sedated   []Unresponsive []Ventilated     []The patient is critically ill on      []Mechanical ventilation []Pressors   []BiPAP []       Medication Review:  · Pressors - none  · Sedation - none  · Antibiotics - Vanco/Zosyn  · Pain - tylenol  · GI/ DVT - Protonix/SQ Heparin  · Others (other gtts)    Safety Bundles:  Severe Sepsis Protocol/ Electrolyte Replacement Protocol                Vital Signs:    Visit Vitals    /60 (BP 1 Location: Right leg, BP Patient Position: At rest)    Pulse (!) 112    Temp 98.4 °F (36.9 °C)    Resp 25    Ht 4' 11\" (1.499 m)    Wt 118.2 kg (260 lb 8 oz)    SpO2 98%    BMI 52.61 kg/m2       O2 Device: Nasal cannula   O2 Flow Rate (L/min): 3 l/min   Temp (24hrs), Av.1 °F (36.7 °C), Min:97.7 °F (36.5 °C), Max:98.4 °F (36.9 °C)       Patient Vitals for the past 8 hrs:   Temp Pulse Resp BP SpO2   17 1200 98.4 °F (36.9 °C) (!) 112 25 130/60 98 %   17 1100 - (!) 104 22 135/62 98 %   17 1000 - (!) 119 21 125/56 99 %   17 0932 98.1 °F (36.7 °C) (!) 108 21 138/50 98 %   17 0926 - - - (!) 140/110 -   17 0913 - - - 163/87 100 %   17 0759 - (!) 115 12 - 100 %   17 0734 - (!) 120 21 - 100 %   17 0545 - (!) 125 22 113/41 100 %   17 0530 - (!) 128 22 110/45 100 %   01/12/17 0515 - (!) 129 22 116/40 100 %       Intake/Output:   Last shift:      01/12 0701 - 01/12 1900  In: 1416.8 [P.O.:740; I.V.:676.8]  Out: 550 [Urine:550]  Last 3 shifts: 01/10 1901 - 01/12 0700  In: -   Out: 1500 [Urine:1500]    Intake/Output Summary (Last 24 hours) at 01/12/17 1309  Last data filed at 01/12/17 1200   Gross per 24 hour   Intake          1416.79 ml   Output             2050 ml   Net          -633.21 ml         Latest lactic acid:   Lactic acid   Date Value Ref Range Status   01/12/2017 4.4 (HH) 0.4 - 2.0 MMOL/L Final     Comment:     CALLED TO AND CORRECTLY REPEATED BY:  JAVED JONES RN, CCU, AT 1248 ON 01/12/2017 BY MAYDA     11/19/2015 1.5 0.4 - 2.0 MMOL/L Final       Completed IVF Resuscitation (30ml/kg) - yes  IVF choice: 0.9NS    Suspected source/s of severe sepsis: lung    Organ dysfunction: Yes    Antibiotics: vancomycin and Zosyn    Completed physical exam: yes      Past Medical History:  Past Medical History   Diagnosis Date    Asthma     Chronic obstructive pulmonary disease ([facility] [address]     Endocrine disease      thyroid issues    Gastrointestinal disorder      \"blockage in my stomach\"    Hypertension           Ventilator Settings:  Mode Rate Tidal Volume Pressure FiO2 PEEP   Spontaneous, Pressure support   460 ml  7 cm H2O 35 % 5 cm H20     Peak airway pressure: 13 cm H2O    Minute ventilation: 12.9 l/min      ARDS network Guidelines: Lung protective strategy, Pl reassure goals less than or equal to 30. Occaisional breaths w/ autopeep. Frequent double triggering. Peak pressures 11        Physical Exam:    Gen: awake and alert. Appears uncomfortable. Morbidly obese female. HEENT: PERRLA, EOMI. Neck: trachea midline, supple, no JVD. CV: Tachy, regular no gallop or mumur  Lungs: Diminished air entry, scattered wheezes. No crackles. Abd: soft, mildly tender throughout, no rebound or guarding. Ext: trace edema  Neuro: CN 2-12 intact. Moving x4.    Skin: no rashes or breakdown. DATA:   Current Facility-Administered Medications   Medication Dose Route Frequency    budesonide (PULMICORT) 500 mcg/2 ml nebulizer suspension  500 mcg Nebulization BID RT    0.45% sodium chloride infusion  50 mL/hr IntraVENous CONTINUOUS    vancomycin (VANCOCIN) 1,500 mg in 0.9% sodium chloride 500 mL IVPB  1,500 mg IntraVENous Q18H    [START ON 1/14/2017] Vancomycin trough level  1 Each Other Rx Dosing/Monitoring    acetaminophen (TYLENOL) tablet 650 mg  650 mg Oral Q4H PRN    rocuronium (ZEMURON) injection    CODE BLUE    pantoprazole (PROTONIX) 40 mg in sodium chloride 0.9 % 10 mL injection  40 mg IntraVENous DAILY    heparin (porcine) injection 5,000 Units  5,000 Units SubCUTAneous Q8H    insulin lispro (HUMALOG) injection   SubCUTAneous Q6H    glucose chewable tablet 16 g  4 Tab Oral PRN    glucagon (GLUCAGEN) injection 1 mg  1 mg IntraMUSCular PRN    dextrose (D50W) injection syrg 12.5-25 g  25-50 mL IntraVENous PRN    methylPREDNISolone (PF) (SOLU-MEDROL) injection 60 mg  60 mg IntraVENous Q6H    nicotine (NICODERM CQ) 14 mg/24 hr patch 1 Patch  1 Patch TransDERmal DAILY    albuterol-ipratropium (DUO-NEB) 2.5 MG-0.5 MG/3 ML  3 mL Nebulization Q4H RT    hydrALAZINE (APRESOLINE) 20 mg/mL injection 20 mg  20 mg IntraVENous Q6H PRN    piperacillin-tazobactam (ZOSYN) 4.5 g in 0.9% sodium chloride (MBP/ADV) 100 mL MBP  4.5 g IntraVENous Q6H        Telemetry: [x]Sinus []A-flutter []Paced    []A-fib []Multiple PVCs                   ECHO     2/2/16  SUMMARY:  Left ventricle: Systolic function was normal. Ejection fraction was  estimated in the range of 55 % to 60 %. There were no regional wall motion  abnormalities. Wall thickness was mildly increased. Right ventricle: Estimated peak pressure was 35 mmHg. Left atrium: Size was normal. LA volume index was 28 ml/m squared. Inferior vena cava, hepatic veins: Not visualized due to poor acoustic  windows.       Labs:  Recent Results (from the past 24 hour(s))   EKG, 12 LEAD, INITIAL    Collection Time: 01/11/17  5:09 PM   Result Value Ref Range    Ventricular Rate 143 BPM    Atrial Rate 143 BPM    P-R Interval 114 ms    QRS Duration 74 ms    Q-T Interval 262 ms    QTC Calculation (Bezet) 404 ms    Calculated P Axis 57 degrees    Calculated R Axis 48 degrees    Calculated T Axis 56 degrees    Diagnosis       Sinus tachycardia with fusion complexes  Otherwise normal ECG  When compared with ECG of 23-NOV-2016 15:24,  fusion complexes are now present  Vent. rate has increased BY  51 BPM  Nonspecific T wave abnormality no longer evident in Inferior leads  Nonspecific T wave abnormality no longer evident in Lateral leads     CBC WITH AUTOMATED DIFF    Collection Time: 01/11/17  5:13 PM   Result Value Ref Range    WBC 14.6 (H) 4.6 - 13.2 K/uL    RBC 4.77 4.20 - 5.30 M/uL    HGB 14.1 12.0 - 16.0 g/dL    HCT 43.6 35.0 - 45.0 %    MCV 91.4 74.0 - 97.0 FL    MCH 29.6 24.0 - 34.0 PG    MCHC 32.3 31.0 - 37.0 g/dL    RDW 14.3 11.6 - 14.5 %    PLATELET 617 247 - 026 K/uL    MPV 11.0 9.2 - 11.8 FL    NEUTROPHILS 78 (H) 40 - 73 %    LYMPHOCYTES 17 (L) 21 - 52 %    MONOCYTES 5 3 - 10 %    EOSINOPHILS 0 0 - 5 %    BASOPHILS 0 0 - 2 %    ABS. NEUTROPHILS 11.3 (H) 1.8 - 8.0 K/UL    ABS. LYMPHOCYTES 2.5 0.9 - 3.6 K/UL    ABS. MONOCYTES 0.8 0.05 - 1.2 K/UL    ABS. EOSINOPHILS 0.0 0.0 - 0.4 K/UL    ABS.  BASOPHILS 0.0 0.0 - 0.1 K/UL    DF AUTOMATED     METABOLIC PANEL, COMPREHENSIVE    Collection Time: 01/11/17  5:13 PM   Result Value Ref Range    Sodium 139 136 - 145 mmol/L    Potassium 4.1 3.5 - 5.5 mmol/L    Chloride 101 100 - 108 mmol/L    CO2 29 21 - 32 mmol/L    Anion gap 9 3.0 - 18 mmol/L    Glucose 139 (H) 74 - 99 mg/dL    BUN 16 7.0 - 18 MG/DL    Creatinine 1.52 (H) 0.6 - 1.3 MG/DL    BUN/Creatinine ratio 11 (L) 12 - 20      GFR est AA 44 (L) >60 ml/min/1.73m2    GFR est non-AA 36 (L) >60 ml/min/1.73m2    Calcium 9.2 8.5 - 10.1 MG/DL    Bilirubin, total 0.2 0.2 - 1.0 MG/DL    ALT 25 13 - 56 U/L    AST 9 (L) 15 - 37 U/L    Alk. phosphatase 122 (H) 45 - 117 U/L    Protein, total 8.2 6.4 - 8.2 g/dL    Albumin 4.2 3.4 - 5.0 g/dL    Globulin 4.0 2.0 - 4.0 g/dL    A-G Ratio 1.1 0.8 - 1.7     CARDIAC PANEL,(CK, CKMB & TROPONIN)    Collection Time: 01/11/17  5:13 PM   Result Value Ref Range    CK 26 26 - 192 U/L    CK - MB <0.5 (L) 0.5 - 3.6 ng/ml    CK-MB Index CANNOT BE CALCULATED 0.0 - 4.0 %    Troponin-I, Qt. <0.02 0.0 - 0.045 NG/ML   MAGNESIUM    Collection Time: 01/11/17  5:13 PM   Result Value Ref Range    Magnesium 3.6 (H) 1.8 - 2.4 mg/dL   TSH 3RD GENERATION    Collection Time: 01/11/17  5:13 PM   Result Value Ref Range    TSH 0.58 0.36 - 3.74 uIU/mL   T4, FREE    Collection Time: 01/11/17  5:13 PM   Result Value Ref Range    T4, Free 1.0 0.7 - 1.5 NG/DL   PRO-BNP    Collection Time: 01/11/17  5:13 PM   Result Value Ref Range    NT pro- 0 - 900 PG/ML   PROTHROMBIN TIME + INR    Collection Time: 01/11/17  5:13 PM   Result Value Ref Range    Prothrombin time 12.3 11.5 - 15.2 sec    INR 0.9 0.8 - 1.2     HEMOGLOBIN A1C WITH EAG    Collection Time: 01/11/17  5:13 PM   Result Value Ref Range    Hemoglobin A1c 5.1 4.2 - 5.6 %    Est. average glucose 100 mg/dL   POC G3    Collection Time: 01/11/17  5:50 PM   Result Value Ref Range    Device: VENT      FIO2 (POC) 100 %    pH (POC) 7.256 (L) 7.35 - 7.45      pCO2 (POC) 62.6 (H) 35.0 - 45.0 MMHG    pO2 (POC) 387 (H) 80 - 100 MMHG    HCO3 (POC) 27.9 (H) 22 - 26 MMOL/L    sO2 (POC) 100 (H) 92 - 97 %    Base deficit (POC) 1 mmol/L    Mode ASSIST CONTROL      Tidal volume 341 ml    Set Rate 16 bpm    PEEP/CPAP (POC) 5 cmH2O    PIP (POC) 23      Allens test (POC) YES      Inspiratory Time 1 sec    Total resp.  rate 19      Site RIGHT RADIAL      Specimen type (POC) ARTERIAL      Performed by Bhavik Fraga     Volume control plus YES     URINALYSIS W/ RFLX MICROSCOPIC    Collection Time: 01/11/17  6:24 PM   Result Value Ref Range    Color YELLOW      Appearance CLEAR      Specific gravity 1.013 1.005 - 1.030      pH (UA) 6.5 5.0 - 8.0      Protein TRACE (A) NEG mg/dL    Glucose NEGATIVE  NEG mg/dL    Ketone NEGATIVE  NEG mg/dL    Bilirubin NEGATIVE  NEG      Blood NEGATIVE  NEG      Urobilinogen 0.2 0.2 - 1.0 EU/dL    Nitrites NEGATIVE  NEG      Leukocyte Esterase NEGATIVE  NEG     DRUG SCREEN, URINE    Collection Time: 01/11/17  6:24 PM   Result Value Ref Range    BENZODIAZEPINE NEGATIVE  NEG      BARBITURATES NEGATIVE  NEG      THC (TH-CANNABINOL) NEGATIVE  NEG      OPIATES NEGATIVE  NEG      PCP(PHENCYCLIDINE) NEGATIVE  NEG      COCAINE NEGATIVE  NEG      AMPHETAMINE NEGATIVE  NEG      METHADONE NEGATIVE  NEG      HDSCOM (NOTE)    HCG URINE, QL    Collection Time: 01/11/17  6:24 PM   Result Value Ref Range    HCG urine, Ql. NEGATIVE  NEG     CULTURE, URINE    Collection Time: 01/11/17  6:24 PM   Result Value Ref Range    Special Requests: NO SPECIAL REQUESTS      Culture result: NO GROWTH AFTER 15 HOURS     URINE MICROSCOPIC ONLY    Collection Time: 01/11/17  6:24 PM   Result Value Ref Range    WBC NONE 0 - 4 /hpf    RBC NONE 0 - 5 /hpf    Epithelial cells FEW 0 - 5 /lpf    Bacteria FEW (A) NEG /hpf   POC G3    Collection Time: 01/11/17  9:04 PM   Result Value Ref Range    Device: VENT      FIO2 (POC) 35 %    pH (POC) 7.247 (LL) 7.35 - 7.45      pCO2 (POC) 62.0 (H) 35.0 - 45.0 MMHG    pO2 (POC) 124 (H) 80 - 100 MMHG    HCO3 (POC) 27.0 (H) 22 - 26 MMOL/L    sO2 (POC) 98 (H) 92 - 97 %    Base deficit (POC) 2 mmol/L    Mode ASSIST CONTROL      Tidal volume 500 ml    Set Rate 16 bpm    PEEP/CPAP (POC) 5 cmH2O    Allens test (POC) N/A      Total resp.  rate 20      Site RIGHT RADIAL      Specimen type (POC) ARTERIAL      Performed by Lenore Muñoz     Volume control plus YES     CULTURE, BLOOD    Collection Time: 01/11/17 10:00 PM   Result Value Ref Range    Special Requests: NO SPECIAL REQUESTS      Culture result: NO GROWTH AFTER 8 HOURS     CULTURE, BLOOD    Collection Time: 01/11/17 10:30 PM   Result Value Ref Range    Special Requests: NO SPECIAL REQUESTS      Culture result: NO GROWTH AFTER 8 HOURS     POC LACTIC ACID    Collection Time: 01/11/17 11:54 PM   Result Value Ref Range    Lactic Acid (POC) 2.6 (HH) 0.4 - 2.0 mmol/L   GLUCOSE, POC    Collection Time: 01/12/17  1:00 AM   Result Value Ref Range    Glucose (POC) 182 (H) 70 - 110 mg/dL   POC G3    Collection Time: 01/12/17  4:25 AM   Result Value Ref Range    Device: VENT      FIO2 (POC) 28 %    pH (POC) 7.319 (L) 7.35 - 7.45      pCO2 (POC) 38.0 35.0 - 45.0 MMHG    pO2 (POC) 108 (H) 80 - 100 MMHG    HCO3 (POC) 19.5 (L) 22 - 26 MMOL/L    sO2 (POC) 98 (H) 92 - 97 %    Base deficit (POC) 6 mmol/L    Mode ASSIST CONTROL      Tidal volume 460 ml    Set Rate 22 bpm    PEEP/CPAP (POC) 11 cmH2O    Allens test (POC) N/A      Total resp. rate 28      Site RIGHT RADIAL      Specimen type (POC) ARTERIAL      Performed by Lala Merrill     Volume control plus YES     CBC WITH AUTOMATED DIFF    Collection Time: 01/12/17  4:44 AM   Result Value Ref Range    WBC 12.3 4.6 - 13.2 K/uL    RBC 4.26 4.20 - 5.30 M/uL    HGB 12.6 12.0 - 16.0 g/dL    HCT 38.7 35.0 - 45.0 %    MCV 90.8 74.0 - 97.0 FL    MCH 29.6 24.0 - 34.0 PG    MCHC 32.6 31.0 - 37.0 g/dL    RDW 14.4 11.6 - 14.5 %    PLATELET 317 438 - 458 K/uL    MPV 10.5 9.2 - 11.8 FL    NEUTROPHILS 93 (H) 40 - 73 %    LYMPHOCYTES 6 (L) 21 - 52 %    MONOCYTES 1 (L) 3 - 10 %    EOSINOPHILS 0 0 - 5 %    BASOPHILS 0 0 - 2 %    ABS. NEUTROPHILS 11.3 (H) 1.8 - 8.0 K/UL    ABS. LYMPHOCYTES 0.8 (L) 0.9 - 3.6 K/UL    ABS. MONOCYTES 0.2 0.05 - 1.2 K/UL    ABS. EOSINOPHILS 0.0 0.0 - 0.4 K/UL    ABS.  BASOPHILS 0.0 0.0 - 0.1 K/UL    DF AUTOMATED     METABOLIC PANEL, BASIC    Collection Time: 01/12/17  4:44 AM   Result Value Ref Range    Sodium 142 136 - 145 mmol/L    Potassium 3.3 (L) 3.5 - 5.5 mmol/L    Chloride 104 100 - 108 mmol/L    CO2 22 21 - 32 mmol/L Anion gap 16 3.0 - 18 mmol/L    Glucose 201 (H) 74 - 99 mg/dL    BUN 14 7.0 - 18 MG/DL    Creatinine 1.36 (H) 0.6 - 1.3 MG/DL    BUN/Creatinine ratio 10 (L) 12 - 20      GFR est AA 49 (L) >60 ml/min/1.73m2    GFR est non-AA 41 (L) >60 ml/min/1.73m2    Calcium 8.3 (L) 8.5 - 10.1 MG/DL   MAGNESIUM    Collection Time: 01/12/17  4:44 AM   Result Value Ref Range    Magnesium 2.3 1.8 - 2.4 mg/dL   CARDIAC PANEL,(CK, CKMB & TROPONIN)    Collection Time: 01/12/17  4:44 AM   Result Value Ref Range    CK 30 26 - 192 U/L    CK - MB <0.5 (L) 0.5 - 3.6 ng/ml    CK-MB Index CANNOT BE CALCULATED 0.0 - 4.0 %    Troponin-I, Qt. <0.02 0.0 - 0.045 NG/ML   HEPATIC FUNCTION PANEL    Collection Time: 01/12/17  4:44 AM   Result Value Ref Range    Protein, total 6.6 6.4 - 8.2 g/dL    Albumin 3.4 3.4 - 5.0 g/dL    Globulin 3.2 2.0 - 4.0 g/dL    A-G Ratio 1.1 0.8 - 1.7      Bilirubin, total <0.1 (L) 0.2 - 1.0 MG/DL    Bilirubin, direct <0.1 0.0 - 0.2 MG/DL    Alk. phosphatase 109 45 - 117 U/L    AST 8 (L) 15 - 37 U/L    ALT 19 13 - 56 U/L   POC LACTIC ACID    Collection Time: 01/12/17  5:13 AM   Result Value Ref Range    Lactic Acid (POC) 7.2 (HH) 0.4 - 2.0 mmol/L   GLUCOSE, POC    Collection Time: 01/12/17  7:36 AM   Result Value Ref Range    Glucose (POC) 167 (H) 70 - 110 mg/dL   POC LACTIC ACID    Collection Time: 01/12/17  7:56 AM   Result Value Ref Range    Lactic Acid (POC) 5.8 (HH) 0.4 - 2.0 mmol/L   POC G3    Collection Time: 01/12/17  8:59 AM   Result Value Ref Range    Device: VENT      FIO2 (POC) 35 %    pH (POC) 7.274 (L) 7.35 - 7.45      pCO2 (POC) 49.8 (H) 35.0 - 45.0 MMHG    pO2 (POC) 30 (LL) 80 - 100 MMHG    HCO3 (POC) 23.1 22 - 26 MMOL/L    sO2 (POC) 49 (L) 92 - 97 %    Base deficit (POC) 4 mmol/L    Mode CPAP/SPON      PEEP/CPAP (POC) 5 cmH2O    Pressure support 7 cmH2O    Allens test (POC) YES      Total resp.  rate 19      Site LEFT RADIAL      Specimen type (POC) ARTERIAL      Performed by BHUMIKA Kaba Collection Time: 01/12/17 11:13 AM   Result Value Ref Range    Glucose (POC) 220 (H) 70 - 110 mg/dL   LACTIC ACID, PLASMA    Collection Time: 01/12/17 11:39 AM   Result Value Ref Range    Lactic acid 4.4 (HH) 0.4 - 2.0 MMOL/L           Recent Labs      01/12/17   0859  01/12/17   0425  01/11/17   2104   FIO2I  35  28  35   HCO3I  23.1  19.5*  27.0*   PCO2I  49.8*  38.0  62.0*   PHI  7.274*  7.319*  7.247*   PO2I  30*  108*  124*       Specimen Description:   Date Value Ref Range Status   12/28/2009 KNEE RT  Final     Special Requests:   Date Value Ref Range Status   01/11/2017 NO SPECIAL REQUESTS   Preliminary     Culture result:   Date Value Ref Range Status   01/11/2017 NO GROWTH AFTER 8 HOURS   Preliminary     Report Status   Date Value Ref Range Status   12/28/2009 01/02/2010 FINAL  Final       Imaging:  [x]I have personally reviewed the patients chest radiographs images and report     CXR Results  (Last 48 hours)               01/12/17 0146  XR CHEST PORT Final result    Impression:  IMPRESSION: Lines and tubes as above. Persistent small left lung base opacity. Narrative:  INDICATION:  Respiratory failure. COMPARISON:  1/11/2017       FINDINGS: A portable AP radiograph of the chest was obtained at 0146 hours. The   patient is on a cardiac monitor. Endotracheal tube with tip approximately 2.6   cm over the [place]. Enteric tube noted coursing below the diaphragm with tip   beyond the field-of-view. No pneumothorax. Persistent prominence of the   pulmonary vasculature. Left lung base small opacity, similar, may represent   atelectasis or infiltrate. No acute osseous abnormality. 01/11/17 1736  XR CHEST PORT Final result    Impression:  IMPRESSION:       1. Low-lying ET tube. Recommend retraction by about 3 cm. 2.  Subsegmental atelectasis in the left lower lobe. Pulmonary vascular   prominence may be chronic. Stable cardiomegaly.        Narrative:  Portable chest x-ray        CPT code 66460        INDICATION: Shortness of breath       COMPARISON: Chest x-ray and CT chest 11/23/2016       FINDINGS: Frontal view of the chest obtained at 1735 hours. The cardiac   silhouette is enlarged and stable in morphology. Endotracheal tube terminates in   the proximal right main bronchus. The lungs demonstrate subsegmental   atelectasis in the left lower lobe. No confluent infiltrates in the right lung. Pulmonary vasculature is mildly prominent and stable. The costophrenic angles   are sharp. There is no pneumothorax. The osseous structures are intact and   normal in morphology. Results from [place] encounter on 01/11/17   XR CHEST PORT   Narrative INDICATION:  Respiratory failure. COMPARISON:  1/11/2017    FINDINGS: A portable AP radiograph of the chest was obtained at 0146 hours. The  patient is on a cardiac monitor. Endotracheal tube with tip approximately 2.6  cm over the [place]. Enteric tube noted coursing below the diaphragm with tip  beyond the field-of-view. No pneumothorax. Persistent prominence of the  pulmonary vasculature. Left lung base small opacity, similar, may represent  atelectasis or infiltrate. No acute osseous abnormality. Impression IMPRESSION: Lines and tubes as above. Persistent small left lung base opacity. Results from [place] encounter on 01/11/17   CT HEAD WO CONT   Narrative CT head without IV contrast    Comparison August 26, 2008    Cortical sulci and ventricles are normal. Brain parenchyma is unremarkable. No  intracranial hemorrhage, mass lesions or cortical infarct involving a major  vessel. No midline shift or extra-axial collection. Artifacts in the posterior  fossa. Likely artifactual hyperdensity at the cervicomedullary junction. Visualized paranasal sinuses and mastoid air cells are clear. Impression IMPRESSION: No acute intracranial abnormalities by CT.            IMPRESSION:   Acute Asthma exacerbation steroid dependent  Acute on chronic hypercarbic respiratory failure  MY  Morbid Obesity  Polysubstance abuse  ? HFpEF  HTN  Steroid related hyperglycemia  Active Problems:    Respiratory failure ([address] (1/11/2017)      Acute encephalopathy (1/11/2017)      ·    RECOMMENDATIONS:   Resp: Successfully extubated but still with wheezing. Continue with solumedrol 60mg q6H for now with scheduled nebs. Change Pulmicort to Symbicort 160. Cont on abx. Add atypical coverage. Probably some chronic hypercarbia suggested by elevtated Co2 int he past. Will continue with BIPAP at night for MY and obesity hypoventilation. As outpt will need Pulm f/u with workup for IgE, allergy testing etc.  Supplemental O2 via NC, titrate flow for goal SPO2> 90%    Continue bronchodilators, pulmonary hygiene care, Aspiration precautions    ID: L basilar opacity concerning for pneumonia. On Vanco/Zosyn for HAP. Will add atypical coverage with azithromycin. Follow cultures. Heme/Onc: leukocytosis improved. No anemia. CVS: Tachycardia related to bronchodilators and asthma exacerbation. Echo ordered. Fluids: on 1/2NS. Now eating and drinking. Will DC. Renal: Mild elevation of Cre chronic. Monitor renal indices. PO hydration. Get echo. Avoid nephrotoxins and hypotension. K repletion 20meq given in ED. Will give another 20meq this evening. Lactic acidosis may be related to albuterol. Trend. GI: SUP. Metabolic: Glycemic control -SSI likely steroid associated hyperglycemia. Neuro/Sedation/Pain: Tylenol for pain. Stress ulcer prophylaxis: Protonix  DVT prophylaxis: SCDs, Heparin  AM labs: Daily CBC BMP Mag and Phos, ABG and chest Xray in the am.   Diet: Diet as tolerated  Lines/Devices: PIV    Likely downgrade to Tele bed if ok this afternoon. Further recommendations will be based on the patient's response to recommended treatment and results of the investigation ordered.         The patient is: [] acutely ill Risk of deterioration: [x] moderate    [x] critically ill  [] high     [x]See my orders for details    My assessment, plan of care, findings, medications, side effects etc were discussed with:  [x]nursing []PT/OT    [x]respiratory therapy [x]Dr. Melanie Lopez   []family [x]Patient     [x]Total critical care time exclusive of procedures 35 minutes with complex decision making performed and > 50% time spent in face to face evaluation. Asberry Cranker, MD       PROGRESS NOTES  Patient seen and examined independently. Agree with note from Stephen Arredondo MD [facility] Fellow. Discussed management plan in rounds, labs and imaging reviewed.     Patient awake, alert, on SBT  She is doing well with good RSBI; stable breathing and oxygenation  ABG done but venous; probably mild resp acidosis; O2 sats 100% on 35% fio2  CXR ETT good; left basal density - atelectasis vs infiltrates  CT head nil acite      IMPRESSION:   · Acute asthma exacerbation in the setting of known history of severe asthma complicating with chronic bronchitis, MY, morbid obesity and ? diastolic heart failure decompensation; ? Vocal cord dysfn as suspected during last admission  · Acute on chronic respiratory failure Intubated in ER, possible aspiration pneumonia with left sided atelectasis on CXR   · Altered mental status- most likely secondary to above  · Acute on chronic hypoxic respiratory failure on home O2/NC @ 2 Lpm,   · MY on CPAP; morbid obesity   · Hypertension- improving   · DEMARCUS- most likely prerenal.   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        RECOMMENDATIONS:   · Patient doing well on SBT; check cuff leak and ok to extubate; BIPAP at night and prn daytime  · Continue iv solumedrol for now  · Duonebs and Budesonide nebs  · Watch BG and SSI   · Follow lactic acid  · Nicotine patch  · Ab - zosyn and iv vanc; treat as HCAP due to recent admission to [facility]  · Gentle hydration; avoid fluid overload  · Replaced kcl   · DVT and GI proph   · D.w patient and daughter   · CC time 36 mins, excludes d/w family or procedures         Edgar Boss MD

## 2017-01-12 NOTE — PROGRESS NOTES
0945-Patient complaining of a pain at a level of 10, however patient is resting in bed calmly with no physical signs of pain noted, spoke with the PA and Dr. Aleyda Hartley about Pain medication received an order for tylenol, notified patient and she states that she will not take tylenol because it does not work for her and she can by that herself at the drug store request if the Doctor will place and order for \"vicodin, percocet or dilaudid instead\". Told patient that I would notify the MD of her request. MD states that tylenol is the order for pain. Notified for patient and she refuses to take the tylenol and asks for something to eat and drink. Seems to be resting comfortably in the bed however still reports that her pain is at a 10 while laughing and smiling with her visitor. 1100-patient refuses heparin, blood sugar is in the 200's patient states that she doesn't take insulin, explained the importance of insulin and after the education patient allows the administration of the insulin but is still questioning if she will take it in the future. Patient is however very pleasant.

## 2017-01-12 NOTE — ED NOTES
Patient transported to ICU unit alert and oriented x3. Daughter accompanied nurse to unit. Daughter has patient belongings to include her EBT card in a bag. She was made aware that the card was in the bag and acknowledged understanding.

## 2017-01-12 NOTE — H&P
History and Physical        Patient: Mikhail Merrill               Sex: female          DOA: 2017         YOB: 1965      Age:  45 y.o.        LOS:  LOS: 0 days        HPI:     Mikhail Merrill is a 45 y.o. female who has a history of COPD, asthma, and HTN who presents to the ED via EMS for respiratory distress. The patient c/o SOB and chest pain. Per EMS, the patient was discharged from [facility] today with a diagnosis of COPD exacerbation. EMS was called for respiratory distress. Patient was placed on CPAP and administered Magnesium Sulfate, Solumedrol, and Albuterol en route. EMS reports that patient was moving very little air and has been altered. Minimal history obtainable due to acuity of condition. Patient intubated by ER. Currently she is intubated and sedated, no family present. 1. COPD. 2. Severe asthma. 3. Obesity. 4. Chronic use of steroids. 5. Hypertension. 6. Chronic constipation. 7. Liver lesions seen on CT, which was felt to be indeterminate on Followup  CT with repeat imaging recommended in 6 months. 8. Secondhand smoke exposure. 9. Steroid-induced hyperglycemia. 10. Multi-nodular goiter. 6. Former tobacco.  12. History of ESBL urinary tract infection.     PAST SURGICAL HISTORY  1. Hemorrhoidectomy. 2. . 3. Abdominal surgery. 4. Placement of what sounds like an IUD for her heavy periods. This was  done on [facility] at [facility]. 5. Packed red blood cell transfusion.     SOCIAL HISTORY: She quit tobacco in . She lives with her daughter who  smokes outside. She is . She got out of senior care 2015.       Social History     Social History    Marital status:      Spouse name: N/A    Number of children: N/A    Years of education: N/A     Social History Main Topics    Smoking status: Never Smoker    Smokeless tobacco: Never Used    Alcohol use Yes      Comment: socially    Drug use: Yes     Special: Cocaine, Heroin    Sexual activity: No      Comment: last used 9 years ago     Other Topics Concern    Not on file     Social History Narrative       Prior to Admission medications    Medication Sig Start Date End Date Taking? Authorizing Provider   oxyCODONE-acetaminophen (PERCOCET 10)  mg per tablet Take 1 Tab by mouth every four (4) hours as needed. Max Daily Amount: 6 Tabs. 7/13/16   Lidia Faust MD   predniSONE (DELTASONE) 20 mg tablet 40 mg daily x 3, 30 mg daily x 3, 20 mg daily x 3, 10 mg daily x 3 then dc, quantity to fill 7/13/16   Lidia Faust MD   guaiFENesin SR (MUCINEX) 600 mg SR tablet Take 1 Tab by mouth two (2) times a day. 6/16/16   Bennett Horne MD   albuterol (VENTOLIN HFA) 90 mcg/actuation inhaler Take 2 Puffs by inhalation every six (6) hours as needed for Wheezing or Shortness of Breath. 5/19/16   Lawson Pineda MD   albuterol sulfate (PROVENTIL;VENTOLIN) 2.5 mg/0.5 mL nebu nebulizer solution One nebulizer treatment every 6 hours while awake x 5 days  then every 6 hours as needed for shortness of breath and/or wheezing. 5/19/16   Lawson Pineda MD   fluticasone-salmeterol (ADVAIR DISKUS) 250-50 mcg/dose diskus inhaler Take 1 Puff by inhalation every twelve (12) hours. 5/19/16   Lawsno Pineda MD   lisinopril (PRINIVIL, ZESTRIL) 20 mg tablet Take 1 Tab by mouth daily. 5/19/16   Lawson Pineda MD   amLODIPine (NORVASC) 5 mg tablet Take 1 Tab by mouth daily. 5/19/16   Lawson Pineda MD   tiotropium (SPIRIVA) 18 mcg inhalation capsule Take 1 Cap by inhalation daily. 5/19/16   Lawson Pineda MD   amitriptyline (ELAVIL) 25 mg tablet Take 1 Tab by mouth nightly. 3/8/16   Magda Alaniz MD   ALPRAZolam Guru Oakes) 0.25 mg tablet Take 1 Tab by mouth daily. Max Daily Amount: 0.25 mg. Indications: ANXIETY WITH DEPRESSION 3/8/16   Magda Alaniz MD   famotidine (PEPCID) 20 mg tablet Take 1 Tab by mouth daily.  11/20/15   Greg Kuo MD   senna-docusate (PERICOLACE) 8.6-50 mg per tablet Take 2 Tabs by mouth nightly. HOLD for loose stool. 11/20/15   Jennifer Whittaker MD   FLUoxetine (PROZAC) 20 mg capsule Take 3 Caps by mouth daily. 11/20/15   Jennifer Whittaker MD   montelukast (SINGULAIR) 10 mg tablet Take 1 Tab by mouth nightly. 11/20/15   Jennifer Whittaker MD   solifenacin (VESICARE) 5 mg tablet Take 1 Tab by mouth daily. 11/20/15   Jennifer Whittaker MD   polyethylene glycol (MIRALAX) 17 gram packet Take 1 Packet by mouth daily. 10/31/15   Jennifer Whittaker MD       No Known Allergies    Review of Systems  Unable to obtain due to patient status        Physical Exam:      Visit Vitals    BP (!) 196/123    Pulse (!) 120    Temp 97.7 °F (36.5 °C)    Resp 18    Ht 5' 8\" (1.727 m)    Wt 122.9 kg (270 lb 15.1 oz)    SpO2 (!) 35%    BMI 41.2 kg/m2     No intake or output data in the 24 hours ending 01/11/17 2011    Physical Exam:  General: comfortable, sedated, morbidly obese  Neck: No adenopathy or thyroid swelling  CVS: No significant JVD (Jugular Venous Distention) observed, PMI (Point of Maximum Impulse) not displaced, regular rate and rhythm, S1S2 normal,  no murmurs  RS: Symmetrical chest rise, clear to auscultation bilaterally  Abd: No distention observed, bowel sounds normal, palpation is soft, non tender, no hepatosplenomegaly, no distension or guarding or rigidity  Neuro: unable to perform exam, patient sedated, pupils equal and reactive to light. Extrm: no leg edema   Skin: no rash    Labs Reviewed:    Chemistry Recent Labs      01/11/17   1713   GLU  139*   NA  139   K  4.1   CL  101   CO2  29   BUN  16   CREA  1.52*   CA  9.2   MG  3.6*   AGAP  9   BUCR  11*   AP  122*   TP  8.2   ALB  4.2   GLOB  4.0   AGRAT  1.1        Lactic Acid Lactic acid   Date Value Ref Range Status   11/19/2015 1.5 0.4 - 2.0 MMOL/L Final     No results for input(s): LAC in the last 72 hours.      Liver Enzymes Protein, total   Date Value Ref Range Status   01/11/2017 8.2 6.4 - 8.2 g/dL Final     Albumin   Date Value Ref Range Status   01/11/2017 4.2 3.4 - 5.0 g/dL Final     Globulin   Date Value Ref Range Status   01/11/2017 4.0 2.0 - 4.0 g/dL Final     A-G Ratio   Date Value Ref Range Status   01/11/2017 1.1 0.8 - 1.7   Final     AST   Date Value Ref Range Status   01/11/2017 9 (L) 15 - 37 U/L Final     Alk.  phosphatase   Date Value Ref Range Status   01/11/2017 122 (H) 45 - 117 U/L Final     Recent Labs      01/11/17   1713   TP  8.2   ALB  4.2   GLOB  4.0   AGRAT  1.1   SGOT  9*   AP  122*        CBC w/Diff Recent Labs      01/11/17   1713   WBC  14.6*   RBC  4.77   HGB  14.1   HCT  43.6   PLT  416   GRANS  78*   LYMPH  17*   EOS  0        Cardiac Enzymes Lab Results   Component Value Date/Time    CPK 26 01/11/2017 05:13 PM    CKMB <0.5 (L) 01/11/2017 05:13 PM    CKND1 CANNOT BE CALCULATED 01/11/2017 05:13 PM    TROIQ <0.02 01/11/2017 05:13 PM        BNP Lab Results   Component Value Date/Time    BNP 19.9 12/07/2015 05:23 PM    BNP 3.4 11/01/2015 03:20 PM        Coagulation Recent Labs      01/11/17   1713   PTP  12.3   INR  0.9         Thyroid  Lab Results   Component Value Date/Time    TSH 0.58 01/11/2017 05:13 PM            ABG Recent Labs      01/11/17   1750   PHI  7.256*   PCO2I  62.6*   PO2I  387*   HCO3I  27.9*   FIO2I  100        Urinalysis Lab Results   Component Value Date/Time    Color YELLOW 01/11/2017 06:24 PM    Appearance CLEAR 01/11/2017 06:24 PM    Specific gravity 1.013 01/11/2017 06:24 PM    pH (UA) 6.5 01/11/2017 06:24 PM    Protein TRACE 01/11/2017 06:24 PM    Glucose NEGATIVE  01/11/2017 06:24 PM    Ketone NEGATIVE  01/11/2017 06:24 PM    Bilirubin NEGATIVE  01/11/2017 06:24 PM    Urobilinogen 0.2 01/11/2017 06:24 PM    Nitrites NEGATIVE  01/11/2017 06:24 PM    Leukocyte Esterase NEGATIVE  01/11/2017 06:24 PM    Epithelial cells FEW 01/11/2017 06:24 PM    Bacteria FEW 01/11/2017 06:24 PM    WBC NONE 01/11/2017 06:24 PM    RBC NONE 01/11/2017 06:24 PM        Micro  No results for input(s): SDES, CULT in the last 72 hours. No results for input(s): CULT in the last 72 hours. XR (Most Recent). CXR reviewed by me and compared with previous CXR   Results from Hospital Encounter encounter on 01/11/17   XR ABD (KUB)   Narrative Abdomen/KUB Tube Placement    CPT CODE: 20973    INDICATION: Tube Placement. COMPARISON: Chest x-ray 1732 hours. FINDINGS:     Portable, supine image obtained at 1946 hours. Enteric tube is located in the  proximal stomach with the sidehole in the esophagus. The bowel gas pattern is  unremarkable. Endotracheal tube terminates about 3 cm above the [place]. Groundglass opacity in the lateral left lung base could be due to pneumonia or  atelectasis. There is stable cardiomegaly. Impression IMPRESSION:    Enteric tube should be advanced by approximately 7 cm to ensure side hole  placement in the stomach. Endotracheal tube as described above. Left basilar  airspace opacity could be due to atelectasis or pneumonia. CT (Most Recent)   Results from Hospital Encounter encounter on 11/23/16   CT CHEST WO CONT   Narrative EXAM: CT Chest     INDICATION: Pain. COMPARISON: Shortness of breath    TECHNIQUE: Axial CT imaging from the thoracic inlet through the diaphragm      intravenous contrast. Multiplanar reformats were generated. Dose reduction techniques used: Automated exposure control, adjustment of the  mAs and/or kVp according to patient's size, and iterative reconstruction  techniques. The specific techniques utilized on this CT exam have been  documented in the patient's electronic medical record.      _______________    FINDINGS:    LUNGS: No suspicious nodule or mass. No abnormal opacities. There is dependent  atelectasis in the lung bases. PLEURA: Normal.    AIRWAY: The proximal airway is narrowed. Remaining airways are patent. MEDIASTINUM: There is cardiomegaly. No pericardial effusion. Great vessels  unremarkable.  Redemonstration of diffusely enlarged thyroid gland.    LYMPH NODES: No enlarged lymph nodes. UPPER ABDOMEN: Unremarkable. Hypodensity within the left hepatic lobe too small  to adequately characterize, likely a cyst.    OTHER: No acute or aggressive osseous abnormalities identified. _______________         Impression IMPRESSION:    No acute pulmonary findings           EKG No results found for this or any previous visit. ECHO No results found for this or any previous visit. PFT No flowsheet data found. Assessment/Plan     1) Hypercapneic/Hypoxic respiratory failure s/p intubated: patient admitted to ICU. Critical care team has written orders for vent management. 2) COPD exacerbation: treatment per critical care team    3) HTN: BP elevated s/p intubation. Treatment per critical care team.    4) steroid induced hyperglycemia: initiated correction insulin. 5) Multinodular goiter: thyroid functions appear normal.  ? History of methimazole use, will need to confirm with patient once she is extubated. 6) Code status:  Patient has historically been a Full Code.     7) DVT/GI prophylaxis: already ordered by Critical Care team.

## 2017-01-12 NOTE — DIABETES MGMT
Glycemic control: Blood glucose has ranged from 139 to 220 mg/dL and pt has received 8 units of correctional Lispro insulin coverage. No history of diabetes, elevated blood glucose likely related to steroids. Monitor need to advance to the very insulin resistant correctional Lispro scale and need for basal insulin. Continue inpatient monitoring and intervention.      Franklyn Rodriguez RD, CDE

## 2017-01-12 NOTE — PROGRESS NOTES
Patient extubated to 4LPM NC following successful SBT, positive cuff leak test and ABG. Suctioned pre and post procedure. No complications. Will continue to monitor.

## 2017-01-12 NOTE — PROGRESS NOTES
SBT initiated, PS 7, PEEP +5, FiO2 35%. 01/12/17 0759   Weaning Parameters   Spontaneous Breathing Trial Complete Yes   Resp Rate Observed 13   Ve 12.9      RSBI 13     Tolerating well. Will continue to monitor.

## 2017-01-12 NOTE — ROUTINE PROCESS
TRANSFER - OUT REPORT:    Verbal report given to [facility] (name) on Bert Maxwell  being transferred to ICU (unit) for routine progression of care       Report consisted of patients Situation, Background, Assessment and   Recommendations(SBAR). Information from the following report(s) ED Summary and MAR was reviewed with the receiving nurse. Lines:   Peripheral IV 01/11/17 Left Arm (Active)   Site Assessment Clean, dry, & intact 1/11/2017  5:40 PM   Phlebitis Assessment 0 1/11/2017  5:40 PM   Infiltration Assessment 0 1/11/2017  5:40 PM   Dressing Status Clean, dry, & intact 1/11/2017  5:40 PM   Dressing Type Tape;Transparent 1/11/2017  5:40 PM   Hub Color/Line Status Pink;Flushed;Patent 1/11/2017  5:40 PM   Action Taken Blood drawn 1/11/2017  5:40 PM       Peripheral IV 01/11/17 Right Antecubital (Active)   Site Assessment Clean, dry, & intact 1/11/2017  5:05 PM   Phlebitis Assessment 0 1/11/2017  5:05 PM   Infiltration Assessment 0 1/11/2017  5:05 PM   Dressing Status Clean, dry, & intact 1/11/2017  5:05 PM   Dressing Type Tape;Transparent 1/11/2017  5:05 PM   Hub Color/Line Status Pink;Flushed;Patent 1/11/2017  5:05 PM        Opportunity for questions and clarification was provided.       Patient transported with:   Nurse, tech

## 2017-01-12 NOTE — PROGRESS NOTES
conducted an initial consultation and Spiritual Assessment for Darshana Carroll, who is a 45 y.o.,female. Patients Primary Language is: [place]. According to the patients EMR Buddhism Affiliation is: [facility].     The reason the Patient came to the hospital is:   Patient Active Problem List    Diagnosis Date Noted    Respiratory failure ([facility] 01/11/2017    Acute encephalopathy 01/11/2017    Acute exacerbation of chronic obstructive pulmonary disease (COPD) ([facility] 08/29/2016    COPD with exacerbation ([facility] 07/11/2016    COPD exacerbation ([facility] 03/12/2016    Acute on chronic respiratory failure with hypoxemia ([facility] 03/12/2016    Shortness of breath 03/05/2016    Asthma 03/05/2016    Abnormal CT of the chest 11/20/2015    Asthma exacerbation attacks 10/19/2015    Status asthmaticus with COPD (chronic obstructive pulmonary disease) ([facility] 10/19/2015        The  provided the following Interventions:  Initiated a relationship of care and support. Listened empathically. Provided information about Spiritual Care Services. Chart reviewed. Provided information concerning an advance directive. The following outcomes where achieved:  Patient wants to look over the advance directive information before completing one. Patient expressed gratitude for 's visit. Assessment:  Patient does not have any Nondenominational/cultural needs that will affect patients preferences in health care. There are no spiritual or Nondenominational issues which require intervention at this time. Plan:  Chaplains will continue to follow and will provide pastoral care on an as needed/requested basis.  recommends bedside caregivers page  on duty if patient shows signs of acute spiritual or emotional distress.     [address]  [phone])

## 2017-01-12 NOTE — PROGRESS NOTES
SUBJECTIVE:    Denies for chest or abdominal pain. Chronic back pain. No N/V. Cough and SOB present    OBJECTIVE:    Visit Vitals    BP (!) 140/116    Pulse (!) 113    Temp 98.4 °F (36.9 °C)    Resp 23    Ht 4' 11\" (1.499 m)    Wt 118.2 kg (260 lb 8 oz)    SpO2 98%    BMI 52.61 kg/m2     General: comfortable, NAD  HEENT:  No pallor. No sinus tenderness over maxillary sinuses  Neck: No JVD. Trachea is midline  CVS: S1S2 normal,  no murmurs  RS: clear to auscultation bilaterally  Abd: ND, NT, BS +  Neuro: sedated, moves all extremities  Extrm: no pedal edema       ASSESSMENT:    1. Acute hypoxic and hypercapnic respiratory failure sec to # 2, s/p intubation and extubation  2. Acute asthma exacerbation. 3. History of severe asthma complicating with chronic bronchitis, MY, morbid obesity. 4. Chronic hypoxic respiratory failure on home O2/NC @ 2 Lpm,   5. MY on CPAP. 6. Chronic diastolic CHF, compensated  7. H/O cocaine heroin abuse in past  8. Hypertension  9. Chronic constipation. 10. Secondhand smoke exposure. 11. Steroid-induced hyperglycemia. 12. Multi-nodular goiter. 15. Former tobacco.  14. History of ESBL urinary tract infection.     PLAN:    Continue current management  CXR and CT head report reviewed  Pulmonology input noted  ABG reviewed  Previous records reviewed  Asked patient to call her family to get CPAP machine    --> total time greater than 35 minutes    CMP:   Lab Results   Component Value Date/Time     01/12/2017 04:44 AM    K 3.3 (L) 01/12/2017 04:44 AM     01/12/2017 04:44 AM    CO2 22 01/12/2017 04:44 AM    AGAP 16 01/12/2017 04:44 AM     (H) 01/12/2017 04:44 AM    BUN 14 01/12/2017 04:44 AM    CREA 1.36 (H) 01/12/2017 04:44 AM    GFRAA 49 (L) 01/12/2017 04:44 AM    GFRNA 41 (L) 01/12/2017 04:44 AM    CA 8.3 (L) 01/12/2017 04:44 AM    MG 2.3 01/12/2017 04:44 AM    ALB 3.4 01/12/2017 04:44 AM    TP 6.6 01/12/2017 04:44 AM    GLOB 3.2 01/12/2017 04:44 AM AGRAT 1.1 01/12/2017 04:44 AM    SGOT 8 (L) 01/12/2017 04:44 AM    ALT 19 01/12/2017 04:44 AM     CBC:   Lab Results   Component Value Date/Time    WBC 12.3 01/12/2017 04:44 AM    HGB 12.6 01/12/2017 04:44 AM    HCT 38.7 01/12/2017 04:44 AM     01/12/2017 04:44 AM

## 2017-01-13 ENCOUNTER — APPOINTMENT (OUTPATIENT)
Dept: GENERAL RADIOLOGY | Age: 52
DRG: 133 | End: 2017-01-13
Attending: INTERNAL MEDICINE
Payer: MEDICAID

## 2017-01-13 LAB
ANION GAP BLD CALC-SCNC: 7 MMOL/L (ref 3–18)
BACTERIA SPEC CULT: NORMAL
BASOPHILS # BLD AUTO: 0 K/UL (ref 0–0.1)
BASOPHILS # BLD: 0 % (ref 0–2)
BUN SERPL-MCNC: 19 MG/DL (ref 7–18)
BUN/CREAT SERPL: 20 (ref 12–20)
CALCIUM SERPL-MCNC: 8.5 MG/DL (ref 8.5–10.1)
CHLORIDE SERPL-SCNC: 108 MMOL/L (ref 100–108)
CO2 SERPL-SCNC: 26 MMOL/L (ref 21–32)
CREAT SERPL-MCNC: 0.93 MG/DL (ref 0.6–1.3)
DIFFERENTIAL METHOD BLD: ABNORMAL
EOSINOPHIL # BLD: 0 K/UL (ref 0–0.4)
EOSINOPHIL NFR BLD: 0 % (ref 0–5)
ERYTHROCYTE [DISTWIDTH] IN BLOOD BY AUTOMATED COUNT: 14.7 % (ref 11.6–14.5)
GLUCOSE BLD STRIP.AUTO-MCNC: 117 MG/DL (ref 70–110)
GLUCOSE BLD STRIP.AUTO-MCNC: 149 MG/DL (ref 70–110)
GLUCOSE BLD STRIP.AUTO-MCNC: 159 MG/DL (ref 70–110)
GLUCOSE BLD STRIP.AUTO-MCNC: 191 MG/DL (ref 70–110)
GLUCOSE SERPL-MCNC: 152 MG/DL (ref 74–99)
HCT VFR BLD AUTO: 36.1 % (ref 35–45)
HGB BLD-MCNC: 11.4 G/DL (ref 12–16)
LACTATE SERPL-SCNC: 1.4 MMOL/L (ref 0.4–2)
LYMPHOCYTES # BLD AUTO: 8 % (ref 21–52)
LYMPHOCYTES # BLD: 0.9 K/UL (ref 0.9–3.6)
MAGNESIUM SERPL-MCNC: 2.4 MG/DL (ref 1.8–2.4)
MCH RBC QN AUTO: 29.1 PG (ref 24–34)
MCHC RBC AUTO-ENTMCNC: 31.6 G/DL (ref 31–37)
MCV RBC AUTO: 92.1 FL (ref 74–97)
MONOCYTES # BLD: 0.5 K/UL (ref 0.05–1.2)
MONOCYTES NFR BLD AUTO: 4 % (ref 3–10)
NEUTS SEG # BLD: 10 K/UL (ref 1.8–8)
NEUTS SEG NFR BLD AUTO: 88 % (ref 40–73)
PLATELET # BLD AUTO: 287 K/UL (ref 135–420)
PMV BLD AUTO: 11 FL (ref 9.2–11.8)
POTASSIUM SERPL-SCNC: 4.4 MMOL/L (ref 3.5–5.5)
RBC # BLD AUTO: 3.92 M/UL (ref 4.2–5.3)
SERVICE CMNT-IMP: NORMAL
SODIUM SERPL-SCNC: 141 MMOL/L (ref 136–145)
WBC # BLD AUTO: 11.4 K/UL (ref 4.6–13.2)

## 2017-01-13 PROCEDURE — 83735 ASSAY OF MAGNESIUM: CPT | Performed by: INTERNAL MEDICINE

## 2017-01-13 PROCEDURE — 65660000000 HC RM CCU STEPDOWN

## 2017-01-13 PROCEDURE — 74011250637 HC RX REV CODE- 250/637: Performed by: INTERNAL MEDICINE

## 2017-01-13 PROCEDURE — 74011250636 HC RX REV CODE- 250/636: Performed by: PHYSICIAN ASSISTANT

## 2017-01-13 PROCEDURE — 85025 COMPLETE CBC W/AUTO DIFF WBC: CPT | Performed by: INTERNAL MEDICINE

## 2017-01-13 PROCEDURE — 74011636637 HC RX REV CODE- 636/637: Performed by: INTERNAL MEDICINE

## 2017-01-13 PROCEDURE — 80048 BASIC METABOLIC PNL TOTAL CA: CPT | Performed by: INTERNAL MEDICINE

## 2017-01-13 PROCEDURE — 83605 ASSAY OF LACTIC ACID: CPT | Performed by: INTERNAL MEDICINE

## 2017-01-13 PROCEDURE — 36415 COLL VENOUS BLD VENIPUNCTURE: CPT | Performed by: INTERNAL MEDICINE

## 2017-01-13 PROCEDURE — 71010 XR CHEST PORT: CPT

## 2017-01-13 PROCEDURE — 94640 AIRWAY INHALATION TREATMENT: CPT

## 2017-01-13 PROCEDURE — C9113 INJ PANTOPRAZOLE SODIUM, VIA: HCPCS | Performed by: PHYSICIAN ASSISTANT

## 2017-01-13 PROCEDURE — 82962 GLUCOSE BLOOD TEST: CPT

## 2017-01-13 PROCEDURE — 74011000250 HC RX REV CODE- 250: Performed by: INTERNAL MEDICINE

## 2017-01-13 PROCEDURE — 74011000250 HC RX REV CODE- 250: Performed by: PHYSICIAN ASSISTANT

## 2017-01-13 PROCEDURE — 74011250637 HC RX REV CODE- 250/637: Performed by: PHYSICIAN ASSISTANT

## 2017-01-13 PROCEDURE — 92610 EVALUATE SWALLOWING FUNCTION: CPT

## 2017-01-13 PROCEDURE — 74011000258 HC RX REV CODE- 258: Performed by: PHYSICIAN ASSISTANT

## 2017-01-13 RX ORDER — OXYCODONE AND ACETAMINOPHEN 5; 325 MG/1; MG/1
1 TABLET ORAL
Status: DISCONTINUED | OUTPATIENT
Start: 2017-01-13 | End: 2017-01-14 | Stop reason: HOSPADM

## 2017-01-13 RX ORDER — BUMETANIDE 1 MG/1
0.5 TABLET ORAL DAILY
Status: DISCONTINUED | OUTPATIENT
Start: 2017-01-13 | End: 2017-01-14

## 2017-01-13 RX ORDER — LISINOPRIL 20 MG/1
20 TABLET ORAL DAILY
Status: DISCONTINUED | OUTPATIENT
Start: 2017-01-14 | End: 2017-01-14 | Stop reason: HOSPADM

## 2017-01-13 RX ORDER — ALBUTEROL SULFATE 2.5 MG/.5ML
2.5 SOLUTION RESPIRATORY (INHALATION)
Status: DISCONTINUED | OUTPATIENT
Start: 2017-01-13 | End: 2017-01-14

## 2017-01-13 RX ORDER — DOXYCYCLINE 100 MG/1
100 CAPSULE ORAL EVERY 12 HOURS
Status: DISCONTINUED | OUTPATIENT
Start: 2017-01-13 | End: 2017-01-14 | Stop reason: HOSPADM

## 2017-01-13 RX ORDER — SOLIFENACIN SUCCINATE 5 MG/1
5 TABLET, FILM COATED ORAL DAILY
Status: DISCONTINUED | OUTPATIENT
Start: 2017-01-14 | End: 2017-01-14 | Stop reason: HOSPADM

## 2017-01-13 RX ORDER — AMOXICILLIN 250 MG
1 CAPSULE ORAL
Status: DISCONTINUED | OUTPATIENT
Start: 2017-01-13 | End: 2017-01-14 | Stop reason: HOSPADM

## 2017-01-13 RX ORDER — MONTELUKAST SODIUM 10 MG/1
10 TABLET ORAL
Status: DISCONTINUED | OUTPATIENT
Start: 2017-01-13 | End: 2017-01-14 | Stop reason: HOSPADM

## 2017-01-13 RX ADMIN — METHYLPREDNISOLONE SODIUM SUCCINATE 60 MG: 40 INJECTION, POWDER, FOR SOLUTION INTRAMUSCULAR; INTRAVENOUS at 06:41

## 2017-01-13 RX ADMIN — FLUOXETINE HYDROCHLORIDE 20 MG: 20 CAPSULE ORAL at 09:24

## 2017-01-13 RX ADMIN — BUMETANIDE 0.5 MG: 1 TABLET ORAL at 13:19

## 2017-01-13 RX ADMIN — FAMOTIDINE 20 MG: 20 TABLET ORAL at 18:13

## 2017-01-13 RX ADMIN — FAMOTIDINE 20 MG: 20 TABLET ORAL at 09:24

## 2017-01-13 RX ADMIN — MONTELUKAST SODIUM 10 MG: 10 TABLET, FILM COATED ORAL at 22:22

## 2017-01-13 RX ADMIN — ALBUTEROL SULFATE 2.5 MG: 2.5 SOLUTION RESPIRATORY (INHALATION) at 12:58

## 2017-01-13 RX ADMIN — DOXYCYCLINE HYCLATE 100 MG: 100 CAPSULE ORAL at 13:24

## 2017-01-13 RX ADMIN — PIPERACILLIN SODIUM AND TAZOBACTAM SODIUM 4.5 G: 4; .5 INJECTION, POWDER, LYOPHILIZED, FOR SOLUTION INTRAVENOUS at 09:12

## 2017-01-13 RX ADMIN — DOXYCYCLINE HYCLATE 100 MG: 100 CAPSULE ORAL at 22:22

## 2017-01-13 RX ADMIN — IPRATROPIUM BROMIDE AND ALBUTEROL SULFATE 3 ML: .5; 3 SOLUTION RESPIRATORY (INHALATION) at 07:49

## 2017-01-13 RX ADMIN — INSULIN LISPRO 2 UNITS: 100 INJECTION, SOLUTION INTRAVENOUS; SUBCUTANEOUS at 12:37

## 2017-01-13 RX ADMIN — BUDESONIDE AND FORMOTEROL FUMARATE DIHYDRATE 2 PUFF: 160; 4.5 AEROSOL RESPIRATORY (INHALATION) at 20:00

## 2017-01-13 RX ADMIN — METHYLPREDNISOLONE SODIUM SUCCINATE 60 MG: 40 INJECTION, POWDER, FOR SOLUTION INTRAMUSCULAR; INTRAVENOUS at 00:23

## 2017-01-13 RX ADMIN — AZITHROMYCIN 250 MG: 250 TABLET, FILM COATED ORAL at 09:25

## 2017-01-13 RX ADMIN — OXYCODONE HYDROCHLORIDE AND ACETAMINOPHEN 1 TABLET: 5; 325 TABLET ORAL at 18:19

## 2017-01-13 RX ADMIN — BUDESONIDE AND FORMOTEROL FUMARATE DIHYDRATE 2 PUFF: 160; 4.5 AEROSOL RESPIRATORY (INHALATION) at 07:49

## 2017-01-13 RX ADMIN — STANDARDIZED SENNA CONCENTRATE AND DOCUSATE SODIUM 1 TABLET: 8.6; 5 TABLET, FILM COATED ORAL at 22:21

## 2017-01-13 RX ADMIN — ALBUTEROL SULFATE 2.5 MG: 2.5 SOLUTION RESPIRATORY (INHALATION) at 20:00

## 2017-01-13 RX ADMIN — OXYCODONE HYDROCHLORIDE AND ACETAMINOPHEN 1 TABLET: 5; 325 TABLET ORAL at 04:56

## 2017-01-13 RX ADMIN — PIPERACILLIN SODIUM AND TAZOBACTAM SODIUM 4.5 G: 4; .5 INJECTION, POWDER, LYOPHILIZED, FOR SOLUTION INTRAVENOUS at 02:35

## 2017-01-13 RX ADMIN — SODIUM CHLORIDE 40 MG: 9 INJECTION INTRAMUSCULAR; INTRAVENOUS; SUBCUTANEOUS at 09:24

## 2017-01-13 RX ADMIN — OXYCODONE HYDROCHLORIDE AND ACETAMINOPHEN 1 TABLET: 5; 325 TABLET ORAL at 12:35

## 2017-01-13 RX ADMIN — INSULIN LISPRO 2 UNITS: 100 INJECTION, SOLUTION INTRAVENOUS; SUBCUTANEOUS at 09:40

## 2017-01-13 RX ADMIN — AMLODIPINE BESYLATE 5 MG: 5 TABLET ORAL at 09:23

## 2017-01-13 NOTE — PROGRESS NOTES
Problem: Dysphagia (Adult)  Goal: *Acute Goals and Plan of Care (Insert Text)  REC:  Dental soft diet  General swallow safety - small sips/bites, slow rate, alternate solids/liquids  HOB >45 for PO intake, >30 post meal  Meds whole with liquid  Outcome: Resolved/Met Date Met:  01/13/17  SPEECH LANGUAGE PATHOLOGY BEDSIDE SWALLOW EVALUATION/DISCHARGE     Patient: Yuki Skelton (36 y.o. female)  Date: 1/13/2017  Primary Diagnosis: Respiratory failure (HCC)  Acute encephalopathy        Precautions:          ASSESSMENT :  Based on the objective data described below, the patient presents with mild oropharyngeal dysphagia in setting of limited dentition, c/w baseline. Clinical evaluation of swallow completed with pt A&Ox4. Oral motor function intact for speech and deglutition however missing posterior dentition; pt reports soft solid diet at baseline. PO assessment of thin liquids +/- straw single and successive sips as well as regular solids tolerated without overt s/s penetration/aspiration. Mildly labored mastication of solid cracker though 100% oral clearance. Pharyngeal swallow WFL to palpation. Educated pt re: diet consistencies and strategies to maximize safety with PO intake in setting of respiratory compromise, understanding voiced and demonstrated. Recommend dental soft diet and thin liquids with general swallow safety precautions. No further acute ST needs, will sign off. PLAN : dental soft diet, general swallow safety precautions  Recommendations and Planned Interventions:  No further acute ST needs, signing off  Discharge Recommendations: None       SUBJECTIVE:   Patient stated I can't eat pork chops or hot dogs. OBJECTIVE:       Past Medical History   Diagnosis Date    Asthma      Chronic obstructive pulmonary disease ([facility] [address]      Endocrine disease         thyroid issues    Gastrointestinal disorder         \"blockage in my stomach\"    Hypertension     History reviewed.  No pertinent past surgical history. Prior Level of Function/Home Situation: per chart/pt  Home Situation  Home Environment: Apartment  # Steps to Enter: 10  One/Two Story Residence: Two story  # of Interior Steps: 10  Height of Each Step (in): 0.5 inches  Interior Rails: Right  Lift Chair Available: No  Living Alone: No  Support Systems: Child(anjelica)  Patient Expects to be Discharged to[de-identified] Apartment  Current DME Used/Available at Home: Cane, straight, Nancy Brighter, Wheelchair  Diet prior to admission: dental soft/thin  Current Diet:  Dental soft/thin   Cognitive and Communication Status:  Neurologic State: Alert  Orientation Level: Oriented X4  Cognition: Follows commands, Appropriate safety awareness, Appropriate for age attention/concentration  Perception: Appears intact  Perseveration: No perseveration noted  Safety/Judgement: Awareness of environment, Good awareness of safety precautions, Insight into deficits  Oral Assessment:  Oral Assessment  Labial: No impairment  Dentition: Limited;Natural  Oral Hygiene: good  Lingual: No impairment  Velum: No impairment  Mandible: No impairment  P.O. Trials:  Patient Position: HOB 45  Vocal quality prior to P.O.: No impairment  Consistency Presented: Thin liquid; Solid  How Presented: Self-fed/presented;Cup/sip;Straw;Successive swallows  How Much:  (x4oz thin, x3 solid)  Bolus Acceptance: No impairment  Bolus Formation/Control: Impaired  Type of Impairment: Mastication  Propulsion: No impairment  Oral Residue: None  Initiation of Swallow: No impairment  Laryngeal Elevation: Functional  Aspiration Signs/Symptoms: None  Pharyngeal Phase Characteristics: No impairment, issues, or problems   Effective Modifications:  Alternate liquids/solids;Small sips and bites  Cues for Modifications: None        Oral Phase Severity: Mild  Pharyngeal Phase Severity : No impairment     GCODESwallowing:   Swallow Current Status CI= 1-19%   Swallow Goal Status CI= 1-19%   Swallow D/C Status CI= 1-19% The severity rating is based on the following outcomes:  LB Noms Swallow Level 6              Clinical Judgement     PAIN:  Start of Eval: 0  End of Eval: 0      After treatment:   [ ]            Patient left in no apparent distress sitting up in chair  [X]            Patient left in no apparent distress in bed  [X]            Call bell left within reach  [ ]            Nursing notified  [ ]            Family present  [ ]            Caregiver present  [ ]            Bed alarm activated      COMMUNICATION/EDUCATION:   [X]            Aspiration precautions; swallow safety; compensatory techniques. [ ]            Patient/family have participated as able in goal setting and plan of care. [ ]            Patient/family agree to work toward stated goals and plan of care. [ ]            Patient understands intent and goals of therapy; neutral about participation. [ ]            Patient unable to participate in goal setting/plan of care; educ ongoing with interdisciplinary staff  [X]            Posted safety precautions in patient's room.      Thank you for this referral.  Kandis Yates SLP  Time Calculation: 10 mins

## 2017-01-13 NOTE — PROGRESS NOTES
SUBJECTIVE:    Denies for chest or abdominal pain. Chronic back pain. No N/V. Cough and SOB present but better. Uses 2 lpm.     OBJECTIVE:    Visit Vitals    /83 (BP 1 Location: Right arm, BP Patient Position: At rest;Head of bed elevated (Comment degrees))    Pulse 77    Temp 97.7 °F (36.5 °C)    Resp 20    Ht 4' 11\" (1.499 m)    Wt 121.4 kg (267 lb 11.2 oz)    SpO2 97%    BMI 54.07 kg/m2     General: comfortable, NAD  CVS: S1S2 normal,  no murmurs  RS: clear to auscultation bilaterally  Abd: ND, NT, BS +  Neuro: sedated, moves all extremities  Extrm: no pedal edema       ASSESSMENT:    1. Acute hypoxic and hypercapnic respiratory failure sec to # 2, s/p intubation and extubation  2. Acute asthma exacerbation due to vocal cord dysfunction and anxiety  3. History of severe asthma complicating with chronic bronchitis, MY, morbid obesity. 4. Chronic hypoxic respiratory failure on home O2/NC @ 2 Lpm,   5. MY on CPAP. 6. Chronic diastolic CHF, compensated  7. H/O cocaine heroin abuse in past  8. Hypertension  9. Chronic constipation. 10. Secondhand smoke exposure. 11. Steroid-induced hyperglycemia. 12. Multi-nodular goiter. 15. Former tobacco.  14. History of ESBL urinary tract infection.   15. Drug seeking behavior    PLAN:    Continue current management  Pulmonology input noted  Asked patient to call her family to get CPAP machine  Doxycycline x 5 days  Continue home medications  No need for BiPAP and will use home CPAP tonight  PT/OT and discharge planning    --> total time greater than 30 minutes    CMP:   Lab Results   Component Value Date/Time     01/13/2017 03:34 AM    K 4.4 01/13/2017 03:34 AM     01/13/2017 03:34 AM    CO2 26 01/13/2017 03:34 AM    AGAP 7 01/13/2017 03:34 AM     (H) 01/13/2017 03:34 AM    BUN 19 (H) 01/13/2017 03:34 AM    CREA 0.93 01/13/2017 03:34 AM    GFRAA >60 01/13/2017 03:34 AM    GFRNA >60 01/13/2017 03:34 AM    CA 8.5 01/13/2017 03:34 AM MG 2.4 01/13/2017 03:34 AM     CBC:   Lab Results   Component Value Date/Time    WBC 11.4 01/13/2017 03:34 AM    HGB 11.4 (L) 01/13/2017 03:34 AM    HCT 36.1 01/13/2017 03:34 AM     01/13/2017 03:34 AM

## 2017-01-13 NOTE — ROUTINE PROCESS
4429 Received patient after report from off going nurse. Patient resting in bed. No distress noted. Call bell within reach, siderails up x 3, bed in lowest position, and patient instructed to use call bell for assistance. Will continue to monitor. 0730 Bedside and Verbal shift change report given to Sikh-Pacifica (oncoming nurse) by Norberto Calles RN (offgoing nurse). Report included the following information Kardex, Intake/Output, MAR and Recent Results.

## 2017-01-13 NOTE — PROGRESS NOTES
0720 Pt received after bedside shift report from Yuli Lopez RN. Pt resting in bed with no distress noted. . Bed locked and in low position instruct pt to call for assistance. Speech therapist at bedside. Pt refused heparin shot. Doctor Xuan Velasquez while at nurses station. 1520 End of shift bedside report given to Carmen Kim RN. Report included the following information. SBAR, MAR, KARDEX AND RECENT RESULTS.

## 2017-01-13 NOTE — ROUTINE PROCESS
Received pt AAOx3, NAD, breathing non labored, on room air, HOB up. Bed at the lowest level on lock position, call bell w/i reach. Bedside and Verbal shift change report given to Felice Pizarro (oncoming nurse) by me (offgoing nurse).  Report included the following information SBAR, Kardex, Intake/Output, MAR, Recent Results and Cardiac Rhythm SR.

## 2017-01-13 NOTE — PROGRESS NOTES
Pulmonology Progress Note    Subjective:     Marilou Colunga is awake, alert and on no pressors, breathing comfortably and without complaints of pain. She feels that she is not yet back to her baseline and still needs some time    Current Facility-Administered Medications   Medication Dose Route Frequency    acetaminophen (TYLENOL) tablet 650 mg  650 mg Oral Q4H PRN    azithromycin (ZITHROMAX) tablet 250 mg  250 mg Oral DAILY    insulin lispro (HUMALOG) injection   SubCUTAneous AC&HS    amLODIPine (NORVASC) tablet 5 mg  5 mg Oral DAILY    famotidine (PEPCID) tablet 20 mg  20 mg Oral BID    FLUoxetine (PROzac) capsule 20 mg  20 mg Oral DAILY    budesonide-formoterol (SYMBICORT) 160-4.5 mcg/actuation HFA inhaler 2 Puff  2 Puff Inhalation BID RT    polyethylene glycol (MIRALAX) packet 17 g  17 g Oral DAILY    oxyCODONE-acetaminophen (PERCOCET) 5-325 mg per tablet 1 Tab  1 Tab Oral Q8H PRN    albuterol-ipratropium (DUO-NEB) 2.5 MG-0.5 MG/3 ML  3 mL Nebulization Q6H RT    pantoprazole (PROTONIX) 40 mg in sodium chloride 0.9 % 10 mL injection  40 mg IntraVENous DAILY    heparin (porcine) injection 5,000 Units  5,000 Units SubCUTAneous Q8H    glucose chewable tablet 16 g  4 Tab Oral PRN    glucagon (GLUCAGEN) injection 1 mg  1 mg IntraMUSCular PRN    dextrose (D50W) injection syrg 12.5-25 g  25-50 mL IntraVENous PRN    nicotine (NICODERM CQ) 14 mg/24 hr patch 1 Patch  1 Patch TransDERmal DAILY    hydrALAZINE (APRESOLINE) 20 mg/mL injection 20 mg  20 mg IntraVENous Q6H PRN       Review of Systems:  Pertinent items are noted in HPI.     Objective:     Visit Vitals    /84 (BP 1 Location: Right arm, BP Patient Position: At rest;Head of bed elevated (Comment degrees))    Pulse 91    Temp 97.8 °F (36.6 °C)    Resp 18    Ht 4' 11\" (1.499 m)    Wt 121.4 kg (267 lb 11.2 oz)    SpO2 96%    BMI 54.07 kg/m2    O2 Flow Rate (L/min): 3 l/min O2 Device: Room air    01/11 1901 - 01/13 0700  In: 3836.8 [P.O.:2060; I.V.:1376.8]  Out: 9774 [Urine:2850]  01/13 0701 - 01/13 1900  In: 400 [P.O.:300; I.V.:100]  Out: -     Physical Exam:   Visit Vitals    /84 (BP 1 Location: Right arm, BP Patient Position: At rest;Head of bed elevated (Comment degrees))    Pulse 91    Temp 97.8 °F (36.6 °C)    Resp 18    Ht 4' 11\" (1.499 m)    Wt 121.4 kg (267 lb 11.2 oz)    SpO2 96%    BMI 54.07 kg/m2     General:  Alert, cooperative, no distress, appears stated age, morbidly obese   Head:  Normocephalic, without obvious abnormality, atraumatic. Eyes:  Conjunctivae/corneas clear. PERRL, EOMs intact. Fundi benign. Ears:  Normal TMs and external ear canals both ears. Nose: Nares normal. Septum midline. Mucosa normal. No drainage or sinus tenderness. Throat: Lips, mucosa, and tongue normal. Teeth and gums normal.   Neck: Supple, symmetrical, trachea midline, no adenopathy, thyroid: no enlargement/tenderness/nodules, no carotid bruit and no JVD. Back:   Symmetric, no curvature. ROM normal. No CVA tenderness. Lungs:   Clear to auscultation bilaterally. Chest wall:  No tenderness or deformity. Heart:  Regular rate and rhythm, S1, S2 normal, no murmur, click, rub or gallop. Breast Exam:  No tenderness, masses, or nipple abnormality. Abdomen:   Soft, non-tender. Bowel sounds normal. No masses,  No organomegaly. Genitalia:  Normal female without lesion, discharge or tenderness. Rectal:  Normal tone,  no masses or tenderness  Guaiac negative stool. Extremities: Extremities normal, atraumatic, no cyanosis or edema. Pulses: 2+ and symmetric all extremities. Skin: Skin color, texture, turgor normal. No rashes or lesions. Lymph nodes: Cervical, supraclavicular, and axillary nodes normal.   Neurologic: CNII-XII intact. Normal strength, sensation and reflexes throughout.        Data Review:    Recent Results (from the past 24 hour(s))   GLUCOSE, POC    Collection Time: 01/12/17 11:13 AM   Result Value Ref Range    Glucose (POC) 220 (H) 70 - 110 mg/dL   LACTIC ACID, PLASMA    Collection Time: 01/12/17 11:39 AM   Result Value Ref Range    Lactic acid 4.4 (HH) 0.4 - 2.0 MMOL/L   GLUCOSE, POC    Collection Time: 01/12/17  4:49 PM   Result Value Ref Range    Glucose (POC) 204 (H) 70 - 110 mg/dL   POC G3    Collection Time: 01/12/17  5:17 PM   Result Value Ref Range    Device: ROOM AIR      FIO2 (POC) 0.21 %    pH (POC) 7.369 7.35 - 7.45      pCO2 (POC) 36.5 35.0 - 45.0 MMHG    pO2 (POC) 76 (L) 80 - 100 MMHG    HCO3 (POC) 21.0 (L) 22 - 26 MMOL/L    sO2 (POC) 95 92 - 97 %    Base deficit (POC) 4 mmol/L    Allens test (POC) YES      Total resp. rate 19      Site LEFT RADIAL      Specimen type (POC) ARTERIAL      Performed by Renetta Marino    POTASSIUM    Collection Time: 01/12/17  6:15 PM   Result Value Ref Range    Potassium 4.2 3.5 - 5.5 mmol/L   GLUCOSE, POC    Collection Time: 01/12/17  8:38 PM   Result Value Ref Range    Glucose (POC) 197 (H) 70 - 110 mg/dL   CBC WITH AUTOMATED DIFF    Collection Time: 01/13/17  3:34 AM   Result Value Ref Range    WBC 11.4 4.6 - 13.2 K/uL    RBC 3.92 (L) 4.20 - 5.30 M/uL    HGB 11.4 (L) 12.0 - 16.0 g/dL    HCT 36.1 35.0 - 45.0 %    MCV 92.1 74.0 - 97.0 FL    MCH 29.1 24.0 - 34.0 PG    MCHC 31.6 31.0 - 37.0 g/dL    RDW 14.7 (H) 11.6 - 14.5 %    PLATELET 485 624 - 693 K/uL    MPV 11.0 9.2 - 11.8 FL    NEUTROPHILS 88 (H) 40 - 73 %    LYMPHOCYTES 8 (L) 21 - 52 %    MONOCYTES 4 3 - 10 %    EOSINOPHILS 0 0 - 5 %    BASOPHILS 0 0 - 2 %    ABS. NEUTROPHILS 10.0 (H) 1.8 - 8.0 K/UL    ABS. LYMPHOCYTES 0.9 0.9 - 3.6 K/UL    ABS. MONOCYTES 0.5 0.05 - 1.2 K/UL    ABS. EOSINOPHILS 0.0 0.0 - 0.4 K/UL    ABS.  BASOPHILS 0.0 0.0 - 0.1 K/UL    DF AUTOMATED     METABOLIC PANEL, BASIC    Collection Time: 01/13/17  3:34 AM   Result Value Ref Range    Sodium 141 136 - 145 mmol/L    Potassium 4.4 3.5 - 5.5 mmol/L    Chloride 108 100 - 108 mmol/L    CO2 26 21 - 32 mmol/L    Anion gap 7 3.0 - 18 mmol/L Glucose 152 (H) 74 - 99 mg/dL    BUN 19 (H) 7.0 - 18 MG/DL    Creatinine 0.93 0.6 - 1.3 MG/DL    BUN/Creatinine ratio 20 12 - 20      GFR est AA >60 >60 ml/min/1.73m2    GFR est non-AA >60 >60 ml/min/1.73m2    Calcium 8.5 8.5 - 10.1 MG/DL   MAGNESIUM    Collection Time: 01/13/17  3:34 AM   Result Value Ref Range    Magnesium 2.4 1.8 - 2.4 mg/dL   LACTIC ACID, PLASMA    Collection Time: 01/13/17  3:34 AM   Result Value Ref Range    Lactic acid 1.4 0.4 - 2.0 MMOL/L   GLUCOSE, POC    Collection Time: 01/13/17  7:45 AM   Result Value Ref Range    Glucose (POC) 191 (H) 70 - 110 mg/dL       Images:     Assessment:     Active Problems:    Respiratory failure ([address] (1/11/2017)      Acute encephalopathy (1/11/2017)        Plan:     Suspect that patient is malingering : when I walked in to the room she was talking very fluently and comfortably on the phone and as soon as she started to talk to me, started to appear breathless and as if she cannot talk.  When asked to take deep breath she was voluntarily stopping her breaths    D/c steroids  D/c abx , zosyn and vanco: no +ve cul;tures so far  D/c zithromax after total 5 days  Continue bipap and neb rx  Needs to ambulate

## 2017-01-13 NOTE — PROGRESS NOTES
Care Management Interventions  PCP Verified by CM: Yes  Palliative Care Consult (Criteria: CHF and RRAT>21): No  Reason for No Palliative Care Consult: Other (see comment)  Mode of Transport at Discharge: Self  Transition of Care Consult (CM Consult): Discharge Planning  MyChart Signup: No  Discharge Durable Medical Equipment: No (patient has wc, CPAP, O2)  Health Maintenance Reviewed: Yes  Physical Therapy Consult: Yes  Occupational Therapy Consult: Yes  Speech Therapy Consult: No  Current Support Network: Own Home (pt's son lives with her)  Confirm Follow Up Transport: Family (son assists with transportation)  Plan discussed with Pt/Family/Caregiver: Yes  Discharge Location  Discharge Placement: Home    Patient 45years old female admitted to [facility] with respiratory failure. Spoke to the patient in room alone, she is AAO x 4, open to conversation.  Patient says she lives with her son, she has listed above DMEs; patient says he is in a

## 2017-01-14 VITALS
DIASTOLIC BLOOD PRESSURE: 82 MMHG | TEMPERATURE: 97.3 F | SYSTOLIC BLOOD PRESSURE: 133 MMHG | RESPIRATION RATE: 18 BRPM | HEART RATE: 78 BPM | OXYGEN SATURATION: 98 % | BODY MASS INDEX: 55.24 KG/M2 | HEIGHT: 59 IN | WEIGHT: 274.03 LBS

## 2017-01-14 LAB
ANION GAP BLD CALC-SCNC: 7 MMOL/L (ref 3–18)
BUN SERPL-MCNC: 23 MG/DL (ref 7–18)
BUN/CREAT SERPL: 18 (ref 12–20)
CALCIUM SERPL-MCNC: 8.2 MG/DL (ref 8.5–10.1)
CHLORIDE SERPL-SCNC: 107 MMOL/L (ref 100–108)
CO2 SERPL-SCNC: 27 MMOL/L (ref 21–32)
CREAT SERPL-MCNC: 1.26 MG/DL (ref 0.6–1.3)
GLUCOSE BLD STRIP.AUTO-MCNC: 128 MG/DL (ref 70–110)
GLUCOSE BLD STRIP.AUTO-MCNC: 86 MG/DL (ref 70–110)
GLUCOSE SERPL-MCNC: 100 MG/DL (ref 74–99)
POTASSIUM SERPL-SCNC: 3.7 MMOL/L (ref 3.5–5.5)
SODIUM SERPL-SCNC: 141 MMOL/L (ref 136–145)

## 2017-01-14 PROCEDURE — 82962 GLUCOSE BLOOD TEST: CPT

## 2017-01-14 PROCEDURE — 94640 AIRWAY INHALATION TREATMENT: CPT

## 2017-01-14 PROCEDURE — 36415 COLL VENOUS BLD VENIPUNCTURE: CPT | Performed by: INTERNAL MEDICINE

## 2017-01-14 PROCEDURE — 80048 BASIC METABOLIC PNL TOTAL CA: CPT | Performed by: INTERNAL MEDICINE

## 2017-01-14 PROCEDURE — 74011000250 HC RX REV CODE- 250: Performed by: INTERNAL MEDICINE

## 2017-01-14 PROCEDURE — 94660 CPAP INITIATION&MGMT: CPT

## 2017-01-14 PROCEDURE — 74011250637 HC RX REV CODE- 250/637: Performed by: PHYSICIAN ASSISTANT

## 2017-01-14 PROCEDURE — 74011250637 HC RX REV CODE- 250/637: Performed by: INTERNAL MEDICINE

## 2017-01-14 RX ORDER — IBUPROFEN 200 MG
1 TABLET ORAL DAILY
Qty: 30 PATCH | Refills: 0 | Status: SHIPPED | OUTPATIENT
Start: 2017-01-14 | End: 2017-02-13

## 2017-01-14 RX ORDER — ALBUTEROL SULFATE 2.5 MG/.5ML
2.5 SOLUTION RESPIRATORY (INHALATION)
Status: DISCONTINUED | OUTPATIENT
Start: 2017-01-14 | End: 2017-01-14

## 2017-01-14 RX ORDER — ALBUTEROL SULFATE 2.5 MG/.5ML
SOLUTION RESPIRATORY (INHALATION)
Qty: 50 ML | Refills: 0 | Status: ON HOLD | OUTPATIENT
Start: 2017-01-14 | End: 2017-01-30

## 2017-01-14 RX ORDER — ALBUTEROL SULFATE 0.83 MG/ML
2.5 SOLUTION RESPIRATORY (INHALATION)
Status: DISCONTINUED | OUTPATIENT
Start: 2017-01-14 | End: 2017-01-14 | Stop reason: HOSPADM

## 2017-01-14 RX ORDER — OXYCODONE AND ACETAMINOPHEN 10; 325 MG/1; MG/1
1 TABLET ORAL
Qty: 30 TAB | Refills: 0 | Status: SHIPPED
Start: 2017-01-14 | End: 2017-01-30

## 2017-01-14 RX ORDER — DOXYCYCLINE 100 MG/1
100 CAPSULE ORAL EVERY 12 HOURS
Qty: 10 CAP | Refills: 0 | Status: SHIPPED | OUTPATIENT
Start: 2017-01-14 | End: 2017-01-19

## 2017-01-14 RX ORDER — BUMETANIDE 1 MG/1
0.5 TABLET ORAL
Status: DISCONTINUED | OUTPATIENT
Start: 2017-01-16 | End: 2017-01-14 | Stop reason: HOSPADM

## 2017-01-14 RX ORDER — BUMETANIDE 0.5 MG/1
0.5 TABLET ORAL
Qty: 15 TAB | Refills: 0 | Status: ON HOLD | OUTPATIENT
Start: 2017-01-14 | End: 2017-03-09

## 2017-01-14 RX ADMIN — BUDESONIDE AND FORMOTEROL FUMARATE DIHYDRATE 2 PUFF: 160; 4.5 AEROSOL RESPIRATORY (INHALATION) at 07:52

## 2017-01-14 RX ADMIN — OXYCODONE HYDROCHLORIDE AND ACETAMINOPHEN 1 TABLET: 5; 325 TABLET ORAL at 06:41

## 2017-01-14 RX ADMIN — LISINOPRIL 20 MG: 20 TABLET ORAL at 09:06

## 2017-01-14 RX ADMIN — ALBUTEROL SULFATE 2.5 MG: 2.5 SOLUTION RESPIRATORY (INHALATION) at 07:53

## 2017-01-14 RX ADMIN — ALBUTEROL SULFATE 2.5 MG: 2.5 SOLUTION RESPIRATORY (INHALATION) at 15:02

## 2017-01-14 RX ADMIN — OXYCODONE HYDROCHLORIDE AND ACETAMINOPHEN 1 TABLET: 5; 325 TABLET ORAL at 00:47

## 2017-01-14 RX ADMIN — SOLIFENACIN SUCCINATE 5 MG: 5 TABLET, FILM COATED ORAL at 09:06

## 2017-01-14 RX ADMIN — BUMETANIDE 0.5 MG: 1 TABLET ORAL at 08:59

## 2017-01-14 RX ADMIN — DOXYCYCLINE HYCLATE 100 MG: 100 CAPSULE ORAL at 08:59

## 2017-01-14 RX ADMIN — ALBUTEROL SULFATE 2.5 MG: 2.5 SOLUTION RESPIRATORY (INHALATION) at 00:45

## 2017-01-14 RX ADMIN — OXYCODONE HYDROCHLORIDE AND ACETAMINOPHEN 1 TABLET: 5; 325 TABLET ORAL at 12:21

## 2017-01-14 RX ADMIN — TIOTROPIUM BROMIDE 18 MCG: 18 CAPSULE ORAL; RESPIRATORY (INHALATION) at 07:52

## 2017-01-14 RX ADMIN — FLUOXETINE HYDROCHLORIDE 20 MG: 20 CAPSULE ORAL at 08:59

## 2017-01-14 RX ADMIN — AMLODIPINE BESYLATE 5 MG: 5 TABLET ORAL at 08:59

## 2017-01-14 RX ADMIN — FAMOTIDINE 20 MG: 20 TABLET ORAL at 08:59

## 2017-01-14 NOTE — PROGRESS NOTES
SUBJECTIVE:    Walking in hallway and states she had some SOB as she walked without oxygen. No chest or abdominal pain. OBJECTIVE:    Visit Vitals    /82 (BP 1 Location: Right arm, BP Patient Position: At rest)    Pulse 78    Temp 97.3 °F (36.3 °C)    Resp 18    Ht 4' 11\" (1.499 m)    Wt 124.3 kg (274 lb 0.5 oz)  Comment: pt did not want to get up b/c bi-pap    SpO2 98%    BMI 55.35 kg/m2     General: comfortable, NAD  CVS: S1S2 normal,  no murmurs  RS: clear to auscultation bilaterally  Abd: ND, NT, BS +  Neuro: sedated, moves all extremities  Extrm: no pedal edema       ASSESSMENT:    1. Acute hypoxic and hypercapnic respiratory failure sec to # 2, s/p intubation and extubation  2. Acute asthma exacerbation due to vocal cord dysfunction and anxiety, resolved   3. History of severe asthma complicating with chronic bronchitis, MY, morbid obesity. 4. Chronic hypoxic respiratory failure on home O2/NC @ 2 Lpm,   5. MY on CPAP. 6. Chronic diastolic CHF, compensated  7. H/O cocaine heroin abuse in past  8. Hypertension  9. Chronic constipation. 10. Secondhand smoke exposure. 11. Steroid-induced hyperglycemia. 12. Multi-nodular goiter. 15. Former tobacco.  14. History of ESBL urinary tract infection.   15. Drug seeking behavior    PLAN:    Discharge her home  No need to stay in hospital for further care    --> total time greater than 30 minutes    CMP:   Lab Results   Component Value Date/Time     01/14/2017 02:38 AM    K 3.7 01/14/2017 02:38 AM     01/14/2017 02:38 AM    CO2 27 01/14/2017 02:38 AM    AGAP 7 01/14/2017 02:38 AM     (H) 01/14/2017 02:38 AM    BUN 23 (H) 01/14/2017 02:38 AM    CREA 1.26 01/14/2017 02:38 AM    GFRAA 54 (L) 01/14/2017 02:38 AM    GFRNA 45 (L) 01/14/2017 02:38 AM    CA 8.2 (L) 01/14/2017 02:38 AM     CBC:   No results found for: WBC, HGB, HGBEXT, HCT, HCTEXT, PLT, PLTEXT, HGBEXT, HCTEXT, PLTEXT      Current Discharge Medication List      START taking these medications    Details   bumetanide (BUMEX) 0.5 mg tablet Take 1 Tab by mouth every fourty-eight (48) hours. Qty: 15 Tab, Refills: 0      doxycycline (VIBRAMYCIN) 100 mg capsule Take 1 Cap by mouth every twelve (12) hours for 5 days. Qty: 10 Cap, Refills: 0      nicotine (NICODERM CQ) 14 mg/24 hr patch 1 Patch by TransDERmal route daily for 30 days. Qty: 30 Patch, Refills: 0         CONTINUE these medications which have CHANGED    Details   oxyCODONE-acetaminophen (PERCOCET 10)  mg per tablet Take 1 Tab by mouth every eight (8) hours as needed. Max Daily Amount: 3 Tabs. Qty: 30 Tab, Refills: 0         CONTINUE these medications which have NOT CHANGED    Details   FLUoxetine (PROZAC) 20 mg capsule Take 3 Caps by mouth daily. Qty: 90 Cap, Refills: 1      albuterol (VENTOLIN HFA) 90 mcg/actuation inhaler Take 2 Puffs by inhalation every six (6) hours as needed for Wheezing or Shortness of Breath. Qty: 1 Inhaler, Refills: 0      albuterol sulfate (PROVENTIL;VENTOLIN) 2.5 mg/0.5 mL nebu nebulizer solution One nebulizer treatment every 6 hours while awake x 5 days  then every 6 hours as needed for shortness of breath and/or wheezing. Qty: 50 mL, Refills: 0      fluticasone-salmeterol (ADVAIR DISKUS) 250-50 mcg/dose diskus inhaler Take 1 Puff by inhalation every twelve (12) hours. Qty: 1 Inhaler, Refills: 0      lisinopril (PRINIVIL, ZESTRIL) 20 mg tablet Take 1 Tab by mouth daily. Qty: 15 Tab, Refills: 2      amLODIPine (NORVASC) 5 mg tablet Take 1 Tab by mouth daily. Qty: 15 Tab, Refills: 0      tiotropium (SPIRIVA) 18 mcg inhalation capsule Take 1 Cap by inhalation daily. Qty: 30 Cap, Refills: 0      famotidine (PEPCID) 20 mg tablet Take 1 Tab by mouth daily. Qty: 30 Tab, Refills: 0      senna-docusate (PERICOLACE) 8.6-50 mg per tablet Take 2 Tabs by mouth nightly. HOLD for loose stool. Qty: 60 Tab, Refills: 1      montelukast (SINGULAIR) 10 mg tablet Take 1 Tab by mouth nightly.   Qty: 30 Tab, Refills: 2      solifenacin (VESICARE) 5 mg tablet Take 1 Tab by mouth daily. Qty: 30 Tab, Refills: 1      polyethylene glycol (MIRALAX) 17 gram packet Take 1 Packet by mouth daily.   Qty: 30 Packet, Refills: 3         STOP taking these medications       predniSONE (DELTASONE) 20 mg tablet Comments:   Reason for Stopping:         guaiFENesin SR (MUCINEX) 600 mg SR tablet Comments:   Reason for Stopping:         amitriptyline (ELAVIL) 25 mg tablet Comments:   Reason for Stopping:         ALPRAZolam (XANAX) 0.25 mg tablet Comments:   Reason for Stopping:

## 2017-01-14 NOTE — ROUTINE PROCESS
Received shift report from [facility] patient laying in bed resting with no distress noted call bell within reach

## 2017-01-14 NOTE — DISCHARGE SUMMARY
PATIENT DISCHARGE INSTRUCTIONS      PATIENT DISCHARGE INSTRUCTIONS    Kunal Guevara / [id number] : 1965    Admitted 2017 Discharged: 2017     Dictated # [id number]    · It is important that you take the medication exactly as they are prescribed. · Keep your medication in the bottles provided by the pharmacist and keep a list of the medication names, dosages, and times to be taken in your wallet. · Do not take other medications without consulting your doctor. What to do at Home    Recommended Diet: Cardiac Diet    Recommended Activity: Activity as tolerated    Current Discharge Medication List      START taking these medications    Details   bumetanide (BUMEX) 0.5 mg tablet Take 1 Tab by mouth every fourty-eight (48) hours. Qty: 15 Tab, Refills: 0      doxycycline (VIBRAMYCIN) 100 mg capsule Take 1 Cap by mouth every twelve (12) hours for 5 days. Qty: 10 Cap, Refills: 0      nicotine (NICODERM CQ) 14 mg/24 hr patch 1 Patch by TransDERmal route daily for 30 days. Qty: 30 Patch, Refills: 0         CONTINUE these medications which have CHANGED    Details   oxyCODONE-acetaminophen (PERCOCET 10)  mg per tablet Take 1 Tab by mouth every eight (8) hours as needed. Max Daily Amount: 3 Tabs. Qty: 30 Tab, Refills: 0      albuterol sulfate (PROVENTIL;VENTOLIN) 2.5 mg/0.5 mL nebu nebulizer solution One nebulizer treatment every 6 hours while awake x 5 days  then every 6 hours as needed for shortness of breath and/or wheezing. Qty: 50 mL, Refills: 0         CONTINUE these medications which have NOT CHANGED    Details   FLUoxetine (PROZAC) 20 mg capsule Take 3 Caps by mouth daily. Qty: 90 Cap, Refills: 1      albuterol (VENTOLIN HFA) 90 mcg/actuation inhaler Take 2 Puffs by inhalation every six (6) hours as needed for Wheezing or Shortness of Breath.   Qty: 1 Inhaler, Refills: 0      fluticasone-salmeterol (ADVAIR DISKUS) 250-50 mcg/dose diskus inhaler Take 1 Puff by inhalation every twelve (12) hours.  Qty: 1 Inhaler, Refills: 0      lisinopril (PRINIVIL, ZESTRIL) 20 mg tablet Take 1 Tab by mouth daily. Qty: 15 Tab, Refills: 2      amLODIPine (NORVASC) 5 mg tablet Take 1 Tab by mouth daily. Qty: 15 Tab, Refills: 0      tiotropium (SPIRIVA) 18 mcg inhalation capsule Take 1 Cap by inhalation daily. Qty: 30 Cap, Refills: 0      famotidine (PEPCID) 20 mg tablet Take 1 Tab by mouth daily. Qty: 30 Tab, Refills: 0      senna-docusate (PERICOLACE) 8.6-50 mg per tablet Take 2 Tabs by mouth nightly. HOLD for loose stool. Qty: 60 Tab, Refills: 1      montelukast (SINGULAIR) 10 mg tablet Take 1 Tab by mouth nightly. Qty: 30 Tab, Refills: 2      solifenacin (VESICARE) 5 mg tablet Take 1 Tab by mouth daily. Qty: 30 Tab, Refills: 1      polyethylene glycol (MIRALAX) 17 gram packet Take 1 Packet by mouth daily.   Qty: 30 Packet, Refills: 3         STOP taking these medications       predniSONE (DELTASONE) 20 mg tablet Comments:   Reason for Stopping:         guaiFENesin SR (MUCINEX) 600 mg SR tablet Comments:   Reason for Stopping:         amitriptyline (ELAVIL) 25 mg tablet Comments:   Reason for Stopping:         ALPRAZolam (XANAX) 0.25 mg tablet Comments:   Reason for Stopping:                 Signed By: Charisse Ferguson MD     January 14, 2017

## 2017-01-14 NOTE — DISCHARGE INSTRUCTIONS
DISCHARGE SUMMARY from Nurse    The following personal items are in your possession at time of discharge:    Dental Appliances: None        Home Medications: None  Jewelry: None  Clothing: Pants, Undergarments  Other Valuables: None             PATIENT INSTRUCTIONS:    After general anesthesia or intravenous sedation, for 24 hours or while taking prescription Narcotics:  · Limit your activities  · Do not drive and operate hazardous machinery  · Do not make important personal or business decisions  · Do  not drink alcoholic beverages  · If you have not urinated within 8 hours after discharge, please contact your surgeon on call. Report the following to your surgeon:  · Excessive pain, swelling, redness or odor of or around the surgical area  · Temperature over 100.5  · Nausea and vomiting lasting longer than 4 hours or if unable to take medications  · Any signs of decreased circulation or nerve impairment to extremity: change in color, persistent  numbness, tingling, coldness or increase pain  · Any questions        What to do at Home:  Recommended activity: Activity as tolerated. If you experience any of the following symptoms chest pain, shortness of breath, dizziness, nausea, vomiting, please follow up with your primary care physician or call 911. *  Please give a list of your current medications to your Primary Care Provider. *  Please update this list whenever your medications are discontinued, doses are      changed, or new medications (including over-the-counter products) are added. *  Please carry medication information at all times in case of emergency situations. These are general instructions for a healthy lifestyle:    No smoking/ No tobacco products/ Avoid exposure to second hand smoke    Surgeon General's Warning:  Quitting smoking now greatly reduces serious risk to your health.     Obesity, smoking, and sedentary lifestyle greatly increases your risk for illness    A healthy diet, regular physical exercise & weight monitoring are important for maintaining a healthy lifestyle    You may be retaining fluid if you have a history of heart failure or if you experience any of the following symptoms:  Weight gain of 3 pounds or more overnight or 5 pounds in a week, increased swelling in our hands or feet or shortness of breath while lying flat in bed. Please call your doctor as soon as you notice any of these symptoms; do not wait until your next office visit. Recognize signs and symptoms of STROKE:    F-face looks uneven    A-arms unable to move or move unevenly    S-speech slurred or non-existent    T-time-call 911 as soon as signs and symptoms begin-DO NOT go       Back to bed or wait to see if you get better-TIME IS BRAIN. Warning Signs of HEART ATTACK     Call 911 if you have these symptoms:   Chest discomfort. Most heart attacks involve discomfort in the center of the chest that lasts more than a few minutes, or that goes away and comes back. It can feel like uncomfortable pressure, squeezing, fullness, or pain.  Discomfort in other areas of the upper body. Symptoms can include pain or discomfort in one or both arms, the back, neck, jaw, or stomach.  Shortness of breath with or without chest discomfort.  Other signs may include breaking out in a cold sweat, nausea, or lightheadedness. Don't wait more than five minutes to call 911 - MINUTES MATTER! Fast action can save your life. Calling 911 is almost always the fastest way to get lifesaving treatment. Emergency Medical Services staff can begin treatment when they arrive -- up to an hour sooner than if someone gets to the hospital by car. The discharge information has been reviewed with the patient. The patient verbalized understanding. Discharge medications reviewed with the patient and appropriate educational materials and side effects teaching were provided.     MyChart Activation    Thank you for requesting access to [employer]. Please follow the instructions below to securely access and download your online medical record. [employer] allows you to send messages to your doctor, view your test results, renew your prescriptions, schedule appointments, and more. How Do I Sign Up? 1. In your internet browser, go to [email or web address]. 2. Click on the First Time User? Click Here link in the Sign In box. You will see the New Member Sign Up page. 3. Enter your [employer] Access Code exactly as it appears below. You will not need to use this code after youve completed the sign-up process. If you do not sign up before the expiration date, you must request a new code. [employer] Access Code: [email or web address]  Expires: 2017  5:59 PM (This is the date your [employer] access code will )    4. Enter the last four digits of your Social Security Number (xxxx) and Date of Birth (mm/dd/yyyy) as indicated and click Submit. You will be taken to the next sign-up page. 5. Create a [employer] ID. This will be your [employer] login ID and cannot be changed, so think of one that is secure and easy to remember. 6. Create a [employer] password. You can change your password at any time. 7. Enter your Password Reset Question and Answer. This can be used at a later time if you forget your password. 8. Enter your e-mail address. You will receive e-mail notification when new information is available in [address] 9. Click Sign Up. You can now view and download portions of your medical record. 10. Click the Download Summary menu link to download a portable copy of your medical information. Additional Information    If you have questions, please visit the Frequently Asked Questions section of the [employer] website at [email or web address] Remember, [employer] is NOT to be used for urgent needs. For medical emergencies, dial 911. Patient armband removed and shredded.

## 2017-01-15 NOTE — DISCHARGE SUMMARY
[address]  [address] [place] [place] SUMMARY    Name:  Lieutenant Servin  MR#:  [id number]  :  1965  Account #:  [de-identified]  Date of Adm:  2017  Date of Discharge:  2017      DATE OF ADMISSIOn: 2017. DATE OF DISCHARGE: 2017. DISPOSITION: Discharged to home. DISCHARGE CONDITION: Stable. DISCHARGE DIAGNOSES:  1. Acute hypoxic and hypercapnic respiratory failure due #2, status  post extubation, now resolved. 2. Acute asthma exacerbation due to vocal cord dysfunction and  anxiety, resolved. 3. History of cerebrovascular asthma, complicating with her chronic  bronchitis, sleep apnea and morbid obesity. 4. Chronic hypoxic respiratory failure on home oxygen, 2 L continuous. 5. Obstructive sleep apnea on CPAP at home. 6. Chronic diastolic congestive heart failure with congestive heart  failure, compensated. 7. Hypertension. 8. History of cocaine and heroin abuse in the past.  9. Second smoke exposure. 10. Chronic constipation, stable. 11. Steroid-induced hyperglycemia. 12. Multinodular goiter. 13. History of extended spectrum beta lactamase urinary tract  infection. 15. Drug-seeking behavior. DISCHARGE MEDICATIONS:  1. Bumex 0.5 mg every 48 hours. 2. Doxycycline 500 mg b.i.d. for 5 days. 3. Nicoderm 14 mg patches. 4. Percocet 10/325 every 8 hours p.r.n. for pain if she has it at home. Otherwise, she can take Tylenol and put BenGay as needed for pain. 5. Prozac 20 mg daily. 6. Albuterol inhaler two puffs every 6 hours p.r.n. for shortness of  breath. 7. Albuterol nebulizer every 6 hours while she is admitted for 5 days  and every 6 hours as needed for shortness of breath. 8. Advair 250/50 one puff b.i.d.  9. Lisinopril 20 mg daily. 10. Norvasc 5 mg daily. 11. Spiriva 1 capsule daily. 12. Pepcid 20 mg daily. 13. Radha-Colace 1 tablet p.o. daily. 14. Singular 10 mg daily. 15. VESIcare 5 mg daily if she was taking it. 16. MiraLax one packet p.o. daily.    IMAGING AND PROCEDURES:  1. Chest x-ray was done, showed subsegmental atelectasis in the left  lower base. 2. Repeat chest x-ray on 01/13/2017 showed the lungs are clear,  without any atelectasis, pulmonary vascular congestion has been  resolved. 3. CT scan of the head done in the emergency room negative for any  acute intracranial process. 4. Blood culture x2 done, negative. 5. Urine culture x1 negative. CONSULTANTS: Pulmonary with Dr. Silverio Pressley and group. HOSPITAL COURSE: The patient was admitted to the hospital on  01/11/2017 with the complaint of respiratory distress, shortness of  breath, as well as chest pain. Please refer to hospital admission H and  P done by Dr. Grant Tejada for further details. The patient was admitted here  with a diagnosis of acute hypoxic and hypercapnic respiratory failure  due to asthma exacerbation due to vocal cord dysfunction and anxiety. She was intubated and transferred to ICU. Within 24 hours, she was  extubated and continued on medications. She was taken off of steroids  as she was feeling much better. She was being followed by Pulmonary  throughout the hospital stay. She had a repeat chest x-ray on  01/13/2017 which showed above-mentioned finding. The patient was  continued on home medication for other comorbidities. The patient has  a narcotic drug seeking behavior and she has been advised to take  Tylenol or Aleve for pain as well as BenGay to apply topically if she  has pain. She verbalized understanding. She says she has 2 liters  oxygen and CPAP machine at home, which she can use it. She will be  discharged home with the above-mentioned medication. She will  remain at high risk for readmission if she remains noncompliant to her  medication and continue taking Xanax or any narcotic pain  medications. She knows about it. She verbalized understanding about  it. DISCHARGE INSTRUCTIONS:  1. DIET: Cardiac diet. 2. Activity as tolerated.   3. Follow with primary care physician in a week. 4. Follow with Pulmonary doctor, Dr. Jose Maria Kearns, as scheduled on  01/17/2016. The patient knows the timing about it. TOTAL TIME: Greater than 35 minutes.         MD KAMI Marie / Javier  D:  01/14/2017   15:46  T:  01/15/2017   06:40  Job #:  [id number]

## 2017-01-17 LAB
BACTERIA SPEC CULT: NORMAL
BACTERIA SPEC CULT: NORMAL
SERVICE CMNT-IMP: NORMAL
SERVICE CMNT-IMP: NORMAL

## 2017-01-26 ENCOUNTER — APPOINTMENT (OUTPATIENT)
Dept: CT IMAGING | Age: 52
DRG: 140 | End: 2017-01-26
Attending: EMERGENCY MEDICINE
Payer: MEDICAID

## 2017-01-26 ENCOUNTER — APPOINTMENT (OUTPATIENT)
Dept: GENERAL RADIOLOGY | Age: 52
DRG: 140 | End: 2017-01-26
Attending: EMERGENCY MEDICINE
Payer: MEDICAID

## 2017-01-26 ENCOUNTER — APPOINTMENT (OUTPATIENT)
Dept: ULTRASOUND IMAGING | Age: 52
DRG: 140 | End: 2017-01-26
Attending: EMERGENCY MEDICINE
Payer: MEDICAID

## 2017-01-26 ENCOUNTER — HOSPITAL ENCOUNTER (INPATIENT)
Age: 52
LOS: 4 days | Discharge: HOME | DRG: 140 | End: 2017-01-30
Attending: EMERGENCY MEDICINE | Admitting: EMERGENCY MEDICINE
Payer: MEDICAID

## 2017-01-26 DIAGNOSIS — J44.1 ACUTE EXACERBATION OF CHRONIC OBSTRUCTIVE PULMONARY DISEASE (COPD) (HCC): Primary | ICD-10-CM

## 2017-01-26 PROBLEM — J45.901 ASTHMA EXACERBATION: Status: ACTIVE | Noted: 2017-01-26

## 2017-01-26 LAB
ALBUMIN SERPL BCP-MCNC: 3.4 G/DL (ref 3.4–5)
ALBUMIN/GLOB SERPL: 1 {RATIO} (ref 0.8–1.7)
ALP SERPL-CCNC: 101 U/L (ref 45–117)
ALT SERPL-CCNC: 19 U/L (ref 13–56)
AMPHET UR QL SCN: NEGATIVE
ANION GAP BLD CALC-SCNC: 11 MMOL/L (ref 3–18)
ARTERIAL PATENCY WRIST A: YES
AST SERPL W P-5'-P-CCNC: 7 U/L (ref 15–37)
ATRIAL RATE: 85 BPM
BARBITURATES UR QL SCN: NEGATIVE
BASE EXCESS BLD CALC-SCNC: 0 MMOL/L
BASOPHILS # BLD AUTO: 0 K/UL (ref 0–0.06)
BASOPHILS # BLD: 0 % (ref 0–2)
BDY SITE: ABNORMAL
BENZODIAZ UR QL: NEGATIVE
BILIRUB SERPL-MCNC: 0.3 MG/DL (ref 0.2–1)
BNP SERPL-MCNC: 236 PG/ML (ref 0–900)
BUN SERPL-MCNC: 13 MG/DL (ref 7–18)
BUN/CREAT SERPL: 15 (ref 12–20)
CALCIUM SERPL-MCNC: 8.7 MG/DL (ref 8.5–10.1)
CALCULATED P AXIS, ECG09: 41 DEGREES
CALCULATED R AXIS, ECG10: 42 DEGREES
CALCULATED T AXIS, ECG11: 49 DEGREES
CANNABINOIDS UR QL SCN: NEGATIVE
CHLORIDE SERPL-SCNC: 107 MMOL/L (ref 100–108)
CK MB CFR SERPL CALC: 1.7 % (ref 0–4)
CK MB CFR SERPL CALC: 1.9 % (ref 0–4)
CK MB SERPL-MCNC: 0.6 NG/ML (ref 0.5–3.6)
CK MB SERPL-MCNC: 0.7 NG/ML (ref 0.5–3.6)
CK SERPL-CCNC: 35 U/L (ref 26–192)
CK SERPL-CCNC: 37 U/L (ref 26–192)
CO2 SERPL-SCNC: 24 MMOL/L (ref 21–32)
COCAINE UR QL SCN: NEGATIVE
CREAT SERPL-MCNC: 0.86 MG/DL (ref 0.6–1.3)
DIAGNOSIS, 93000: NORMAL
DIFFERENTIAL METHOD BLD: NORMAL
EOSINOPHIL # BLD: 0.1 K/UL (ref 0–0.4)
EOSINOPHIL NFR BLD: 1 % (ref 0–5)
ERYTHROCYTE [DISTWIDTH] IN BLOOD BY AUTOMATED COUNT: 13.8 % (ref 11.6–14.5)
FLUAV AG NPH QL IA: NEGATIVE
FLUBV AG NOSE QL IA: NEGATIVE
GAS FLOW.O2 O2 DELIVERY SYS: ABNORMAL L/MIN
GAS FLOW.O2 SETTING OXYMISER: 6 L/M
GLOBULIN SER CALC-MCNC: 3.5 G/DL (ref 2–4)
GLUCOSE BLD STRIP.AUTO-MCNC: 151 MG/DL (ref 70–110)
GLUCOSE SERPL-MCNC: 126 MG/DL (ref 74–99)
HCG UR QL: NEGATIVE
HCO3 BLD-SCNC: 24.9 MMOL/L (ref 22–26)
HCT VFR BLD AUTO: 40 % (ref 35–45)
HDSCOM,HDSCOM: ABNORMAL
HGB BLD-MCNC: 13.3 G/DL (ref 12–16)
LYMPHOCYTES # BLD AUTO: 25 % (ref 21–52)
LYMPHOCYTES # BLD: 2.5 K/UL (ref 0.9–3.6)
MCH RBC QN AUTO: 29.2 PG (ref 24–34)
MCHC RBC AUTO-ENTMCNC: 33.3 G/DL (ref 31–37)
MCV RBC AUTO: 87.9 FL (ref 74–97)
METHADONE UR QL: NEGATIVE
MONOCYTES # BLD: 0.6 K/UL (ref 0.05–1.2)
MONOCYTES NFR BLD AUTO: 6 % (ref 3–10)
NEUTS SEG # BLD: 6.8 K/UL (ref 1.8–8)
NEUTS SEG NFR BLD AUTO: 68 % (ref 40–73)
OPIATES UR QL: POSITIVE
P-R INTERVAL, ECG05: 132 MS
PCO2 BLD: 42.8 MMHG (ref 35–45)
PCP UR QL: NEGATIVE
PH BLD: 7.37 [PH] (ref 7.35–7.45)
PLATELET # BLD AUTO: 257 K/UL (ref 135–420)
PMV BLD AUTO: 10.8 FL (ref 9.2–11.8)
PO2 BLD: 165 MMHG (ref 80–100)
POTASSIUM SERPL-SCNC: 3.6 MMOL/L (ref 3.5–5.5)
PROT SERPL-MCNC: 6.9 G/DL (ref 6.4–8.2)
Q-T INTERVAL, ECG07: 388 MS
QRS DURATION, ECG06: 70 MS
QTC CALCULATION (BEZET), ECG08: 461 MS
RBC # BLD AUTO: 4.55 M/UL (ref 4.2–5.3)
SAO2 % BLD: 99 % (ref 92–97)
SERVICE CMNT-IMP: ABNORMAL
SODIUM SERPL-SCNC: 142 MMOL/L (ref 136–145)
SPECIMEN TYPE: ABNORMAL
T4 FREE SERPL-MCNC: 1.1 NG/DL (ref 0.7–1.5)
TROPONIN I SERPL-MCNC: <0.02 NG/ML (ref 0–0.04)
TROPONIN I SERPL-MCNC: <0.02 NG/ML (ref 0–0.04)
TSH SERPL DL<=0.05 MIU/L-ACNC: 0.09 UIU/ML (ref 0.36–3.74)
VENTRICULAR RATE, ECG03: 85 BPM
WBC # BLD AUTO: 10.1 K/UL (ref 4.6–13.2)

## 2017-01-26 PROCEDURE — 93005 ELECTROCARDIOGRAM TRACING: CPT

## 2017-01-26 PROCEDURE — 84443 ASSAY THYROID STIM HORMONE: CPT | Performed by: EMERGENCY MEDICINE

## 2017-01-26 PROCEDURE — 96375 TX/PRO/DX INJ NEW DRUG ADDON: CPT

## 2017-01-26 PROCEDURE — 83880 ASSAY OF NATRIURETIC PEPTIDE: CPT | Performed by: EMERGENCY MEDICINE

## 2017-01-26 PROCEDURE — 65270000029 HC RM PRIVATE

## 2017-01-26 PROCEDURE — 87804 INFLUENZA ASSAY W/OPTIC: CPT | Performed by: EMERGENCY MEDICINE

## 2017-01-26 PROCEDURE — 74011000250 HC RX REV CODE- 250: Performed by: EMERGENCY MEDICINE

## 2017-01-26 PROCEDURE — 94640 AIRWAY INHALATION TREATMENT: CPT

## 2017-01-26 PROCEDURE — 71010 XR CHEST SNGL V: CPT

## 2017-01-26 PROCEDURE — 77030013140 HC MSK NEB VYRM -A

## 2017-01-26 PROCEDURE — 94761 N-INVAS EAR/PLS OXIMETRY MLT: CPT

## 2017-01-26 PROCEDURE — 81025 URINE PREGNANCY TEST: CPT | Performed by: EMERGENCY MEDICINE

## 2017-01-26 PROCEDURE — 82550 ASSAY OF CK (CPK): CPT | Performed by: EMERGENCY MEDICINE

## 2017-01-26 PROCEDURE — 80053 COMPREHEN METABOLIC PANEL: CPT | Performed by: EMERGENCY MEDICINE

## 2017-01-26 PROCEDURE — 74011250636 HC RX REV CODE- 250/636: Performed by: EMERGENCY MEDICINE

## 2017-01-26 PROCEDURE — 85025 COMPLETE CBC W/AUTO DIFF WBC: CPT | Performed by: EMERGENCY MEDICINE

## 2017-01-26 PROCEDURE — 76536 US EXAM OF HEAD AND NECK: CPT

## 2017-01-26 PROCEDURE — 71275 CT ANGIOGRAPHY CHEST: CPT

## 2017-01-26 PROCEDURE — 82803 BLOOD GASES ANY COMBINATION: CPT

## 2017-01-26 PROCEDURE — 82962 GLUCOSE BLOOD TEST: CPT

## 2017-01-26 PROCEDURE — 99285 EMERGENCY DEPT VISIT HI MDM: CPT

## 2017-01-26 PROCEDURE — 84439 ASSAY OF FREE THYROXINE: CPT | Performed by: EMERGENCY MEDICINE

## 2017-01-26 PROCEDURE — 74011250637 HC RX REV CODE- 250/637: Performed by: EMERGENCY MEDICINE

## 2017-01-26 PROCEDURE — 74011636320 HC RX REV CODE- 636/320: Performed by: EMERGENCY MEDICINE

## 2017-01-26 PROCEDURE — 74011636637 HC RX REV CODE- 636/637: Performed by: EMERGENCY MEDICINE

## 2017-01-26 PROCEDURE — 80307 DRUG TEST PRSMV CHEM ANLYZR: CPT | Performed by: EMERGENCY MEDICINE

## 2017-01-26 PROCEDURE — 36600 WITHDRAWAL OF ARTERIAL BLOOD: CPT

## 2017-01-26 PROCEDURE — 96374 THER/PROPH/DIAG INJ IV PUSH: CPT

## 2017-01-26 RX ORDER — ALBUTEROL SULFATE 0.83 MG/ML
5 SOLUTION RESPIRATORY (INHALATION)
Status: COMPLETED | OUTPATIENT
Start: 2017-01-26 | End: 2017-01-26

## 2017-01-26 RX ORDER — HYDRALAZINE HYDROCHLORIDE 20 MG/ML
10 INJECTION INTRAMUSCULAR; INTRAVENOUS
Status: DISCONTINUED | OUTPATIENT
Start: 2017-01-26 | End: 2017-01-30 | Stop reason: HOSPADM

## 2017-01-26 RX ORDER — MONTELUKAST SODIUM 10 MG/1
10 TABLET ORAL
Status: DISCONTINUED | OUTPATIENT
Start: 2017-01-26 | End: 2017-01-30 | Stop reason: HOSPADM

## 2017-01-26 RX ORDER — AMOXICILLIN 250 MG
2 CAPSULE ORAL
Status: DISCONTINUED | OUTPATIENT
Start: 2017-01-26 | End: 2017-01-30 | Stop reason: HOSPADM

## 2017-01-26 RX ORDER — SOLIFENACIN SUCCINATE 5 MG/1
5 TABLET, FILM COATED ORAL DAILY
Status: DISCONTINUED | OUTPATIENT
Start: 2017-01-27 | End: 2017-01-30 | Stop reason: HOSPADM

## 2017-01-26 RX ORDER — OXYCODONE AND ACETAMINOPHEN 10; 325 MG/1; MG/1
1 TABLET ORAL
Status: DISCONTINUED | OUTPATIENT
Start: 2017-01-26 | End: 2017-01-28

## 2017-01-26 RX ORDER — BUDESONIDE AND FORMOTEROL FUMARATE DIHYDRATE 160; 4.5 UG/1; UG/1
2 AEROSOL RESPIRATORY (INHALATION)
Status: DISCONTINUED | OUTPATIENT
Start: 2017-01-26 | End: 2017-01-30 | Stop reason: HOSPADM

## 2017-01-26 RX ORDER — IPRATROPIUM BROMIDE AND ALBUTEROL SULFATE 2.5; .5 MG/3ML; MG/3ML
3 SOLUTION RESPIRATORY (INHALATION)
Status: DISCONTINUED | OUTPATIENT
Start: 2017-01-26 | End: 2017-01-28

## 2017-01-26 RX ORDER — DEXTROSE 50 % IN WATER (D50W) INTRAVENOUS SYRINGE
25-50 AS NEEDED
Status: DISCONTINUED | OUTPATIENT
Start: 2017-01-26 | End: 2017-01-30 | Stop reason: HOSPADM

## 2017-01-26 RX ORDER — FLUOXETINE HYDROCHLORIDE 20 MG/1
20 CAPSULE ORAL DAILY
Status: DISCONTINUED | OUTPATIENT
Start: 2017-01-27 | End: 2017-01-30 | Stop reason: HOSPADM

## 2017-01-26 RX ORDER — MAGNESIUM SULFATE 100 %
4 CRYSTALS MISCELLANEOUS AS NEEDED
Status: DISCONTINUED | OUTPATIENT
Start: 2017-01-26 | End: 2017-01-30 | Stop reason: HOSPADM

## 2017-01-26 RX ORDER — AMLODIPINE BESYLATE 5 MG/1
5 TABLET ORAL DAILY
Status: DISCONTINUED | OUTPATIENT
Start: 2017-01-27 | End: 2017-01-27

## 2017-01-26 RX ORDER — MORPHINE SULFATE 4 MG/ML
4 INJECTION, SOLUTION INTRAMUSCULAR; INTRAVENOUS
Status: COMPLETED | OUTPATIENT
Start: 2017-01-26 | End: 2017-01-26

## 2017-01-26 RX ORDER — KETOROLAC TROMETHAMINE 30 MG/ML
30 INJECTION, SOLUTION INTRAMUSCULAR; INTRAVENOUS
Status: COMPLETED | OUTPATIENT
Start: 2017-01-26 | End: 2017-01-26

## 2017-01-26 RX ORDER — POLYETHYLENE GLYCOL 3350 17 G/17G
17 POWDER, FOR SOLUTION ORAL DAILY
Status: DISCONTINUED | OUTPATIENT
Start: 2017-01-27 | End: 2017-01-30 | Stop reason: HOSPADM

## 2017-01-26 RX ORDER — INSULIN LISPRO 100 [IU]/ML
INJECTION, SOLUTION INTRAVENOUS; SUBCUTANEOUS
Status: DISCONTINUED | OUTPATIENT
Start: 2017-01-26 | End: 2017-01-30 | Stop reason: HOSPADM

## 2017-01-26 RX ORDER — LISINOPRIL 10 MG/1
10 TABLET ORAL DAILY
Status: DISCONTINUED | OUTPATIENT
Start: 2017-01-27 | End: 2017-01-30 | Stop reason: HOSPADM

## 2017-01-26 RX ORDER — ENOXAPARIN SODIUM 100 MG/ML
40 INJECTION SUBCUTANEOUS EVERY 24 HOURS
Status: DISCONTINUED | OUTPATIENT
Start: 2017-01-26 | End: 2017-01-30 | Stop reason: HOSPADM

## 2017-01-26 RX ORDER — ALBUTEROL SULFATE 0.83 MG/ML
2.5 SOLUTION RESPIRATORY (INHALATION)
Status: DISCONTINUED | OUTPATIENT
Start: 2017-01-26 | End: 2017-01-30 | Stop reason: HOSPADM

## 2017-01-26 RX ORDER — IPRATROPIUM BROMIDE AND ALBUTEROL SULFATE 2.5; .5 MG/3ML; MG/3ML
3 SOLUTION RESPIRATORY (INHALATION)
Status: COMPLETED | OUTPATIENT
Start: 2017-01-26 | End: 2017-01-26

## 2017-01-26 RX ORDER — DOXYCYCLINE 100 MG/1
100 CAPSULE ORAL EVERY 12 HOURS
Status: DISCONTINUED | OUTPATIENT
Start: 2017-01-26 | End: 2017-01-30 | Stop reason: HOSPADM

## 2017-01-26 RX ORDER — BUMETANIDE 1 MG/1
0.5 TABLET ORAL
Status: DISCONTINUED | OUTPATIENT
Start: 2017-01-27 | End: 2017-01-30 | Stop reason: HOSPADM

## 2017-01-26 RX ORDER — NALOXONE HYDROCHLORIDE 0.4 MG/ML
0.4 INJECTION, SOLUTION INTRAMUSCULAR; INTRAVENOUS; SUBCUTANEOUS AS NEEDED
Status: DISCONTINUED | OUTPATIENT
Start: 2017-01-26 | End: 2017-01-30 | Stop reason: HOSPADM

## 2017-01-26 RX ORDER — FAMOTIDINE 20 MG/1
20 TABLET, FILM COATED ORAL DAILY
Status: DISCONTINUED | OUTPATIENT
Start: 2017-01-27 | End: 2017-01-30 | Stop reason: HOSPADM

## 2017-01-26 RX ORDER — SAME BUTANEDISULFONATE/BETAINE 400-600 MG
250 POWDER IN PACKET (EA) ORAL 2 TIMES DAILY
Status: DISCONTINUED | OUTPATIENT
Start: 2017-01-26 | End: 2017-01-30 | Stop reason: HOSPADM

## 2017-01-26 RX ADMIN — Medication 4 MG: at 15:23

## 2017-01-26 RX ADMIN — ALBUTEROL SULFATE 5 MG: 2.5 SOLUTION RESPIRATORY (INHALATION) at 15:09

## 2017-01-26 RX ADMIN — MONTELUKAST SODIUM 10 MG: 10 TABLET, FILM COATED ORAL at 22:18

## 2017-01-26 RX ADMIN — ALBUTEROL SULFATE 5 MG: 2.5 SOLUTION RESPIRATORY (INHALATION) at 09:40

## 2017-01-26 RX ADMIN — IOPAMIDOL 87.1 ML: 755 INJECTION, SOLUTION INTRAVENOUS at 11:32

## 2017-01-26 RX ADMIN — METHYLPREDNISOLONE SODIUM SUCCINATE 60 MG: 125 INJECTION, POWDER, FOR SOLUTION INTRAMUSCULAR; INTRAVENOUS at 20:50

## 2017-01-26 RX ADMIN — KETOROLAC TROMETHAMINE 30 MG: 30 INJECTION, SOLUTION INTRAMUSCULAR at 11:10

## 2017-01-26 RX ADMIN — ALBUTEROL SULFATE 5 MG: 2.5 SOLUTION RESPIRATORY (INHALATION) at 07:14

## 2017-01-26 RX ADMIN — OXYCODONE HYDROCHLORIDE AND ACETAMINOPHEN 1 TABLET: 10; 325 TABLET ORAL at 19:14

## 2017-01-26 RX ADMIN — IPRATROPIUM BROMIDE AND ALBUTEROL SULFATE 3 ML: .5; 3 SOLUTION RESPIRATORY (INHALATION) at 08:20

## 2017-01-26 RX ADMIN — INSULIN LISPRO 2 UNITS: 100 INJECTION, SOLUTION INTRAVENOUS; SUBCUTANEOUS at 22:18

## 2017-01-26 RX ADMIN — Medication 4 MG: at 08:52

## 2017-01-26 RX ADMIN — IPRATROPIUM BROMIDE AND ALBUTEROL SULFATE 3 ML: .5; 3 SOLUTION RESPIRATORY (INHALATION) at 20:50

## 2017-01-26 RX ADMIN — STANDARDIZED SENNA CONCENTRATE AND DOCUSATE SODIUM 2 TABLET: 8.6; 5 TABLET, FILM COATED ORAL at 22:18

## 2017-01-26 RX ADMIN — ENOXAPARIN SODIUM 40 MG: 40 INJECTION SUBCUTANEOUS at 20:50

## 2017-01-26 RX ADMIN — DOXYCYCLINE HYCLATE 100 MG: 100 CAPSULE ORAL at 20:50

## 2017-01-26 NOTE — Clinical Note
Status[de-identified] Inpatient [101] Type of Bed: Stepdown [17] Inpatient Hospitalization Certified Necessary for the Following Reasons: 3. Patient receiving treatment that can only be provided in an inpatient setting (further clarification in H&P documentation) Admitting Diagnosis: Shortness of breath [786.05. ICD-9-CM] Admitting Physician: Peter López Attending Physician: Peter López Estimated Length of Stay: > or = to 2 Midnights Discharge Plan[de-identified] Home with Office Follow-up

## 2017-01-26 NOTE — ED PROVIDER NOTES
HPI Comments: 7:27 AM. Paniflo Smith is a 45 y.o. female with a history of COPD, asthma, and HTN who presents to the ED via EMS for respiratory distress. The patient reports SOB, which started yesterday. Her history is otherwise limited due to the respiratory distress. The patient was recently admitted for the same symptoms and required intubation. Does endorse chest tightness that worsened today. Denies any fevers, nausea, vomiting, drug use or any other complaints at this time. The history is provided by the patient. Past Medical History:   Diagnosis Date    Asthma     Chronic obstructive pulmonary disease ([address]     Endocrine disease      thyroid issues    Gastrointestinal disorder      \"blockage in my stomach\"    Hypertension        History reviewed. No pertinent past surgical history. History reviewed. No pertinent family history. Social History     Social History    Marital status:      Spouse name: N/A    Number of children: N/A    Years of education: N/A     Occupational History    Not on file. Social History Main Topics    Smoking status: Never Smoker    Smokeless tobacco: Never Used    Alcohol use Yes      Comment: socially    Drug use: Yes     Special: Cocaine, Heroin    Sexual activity: No      Comment: last used 9 years ago     Other Topics Concern    Not on file     Social History Narrative         ALLERGIES: Review of patient's allergies indicates no known allergies. Review of Systems   Unable to perform ROS: Severe respiratory distress       Vitals:    01/26/17 1000 01/26/17 1045 01/26/17 1141 01/26/17 1145   BP: 178/90   180/86   Pulse: 87 90 91 90   Resp: 20 18 16 21   Temp:       SpO2: 99% 100% 97% 100%   Weight:       Height:                Physical Exam   Constitutional: She is oriented to person, place, and time. HENT:   Head: Atraumatic.    Mouth/Throat: Oropharynx is clear and moist.   Eyes: Conjunctivae are normal. Pupils are equal, round, and reactive to light. No scleral icterus. Neck: Normal range of motion. Neck supple. No JVD present. Cardiovascular: Normal rate, regular rhythm and normal heart sounds. Pulmonary/Chest: She is in respiratory distress. She exhibits no tenderness. Increased work of breathing  Poor air movement bilaterally  Scant wheezing in upper lung fields   Abdominal: Soft. Bowel sounds are normal. She exhibits no distension. There is no tenderness. There is no rebound and no guarding. Musculoskeletal: She exhibits no tenderness. Bilateral lower extremity edema   Neurological: She is alert and oriented to person, place, and time. No cranial nerve deficit. Skin: Skin is warm and dry. Psychiatric: She has a normal mood and affect. Nursing note and vitals reviewed. MDM  Number of Diagnoses or Management Options  Diagnosis management comments: Brendan Sosa is a 45 y.o. female presenting with increased work of breathing, recent intubation. Pt s/p neb, magnesium sulfate and solumedrol en route. Has some air movement and some wheezing now which is improved per EMS. Pt endorses some improvement in symptoms but still working to breath so bipap started and pt's RR decreased and had increased air movement. History initially limited but per friend at bedside denies any recent illness. Concern for ACS, PE, CHF, pna, drug use, anemia, vs exacerbation of her reactive airway disease. Will continue nebs and monitor closely.      ED Course       Procedures    Vitals:  Patient Vitals for the past 12 hrs:   Temp Pulse Resp BP SpO2   01/26/17 1145 - 90 21 180/86 100 %   01/26/17 1141 - 91 16 - 97 %   01/26/17 1045 - 90 18 - 100 %   01/26/17 1000 - 87 20 178/90 99 %   01/26/17 0940 - 92 - 187/87 -   01/26/17 0930 - 93 23 187/87 100 %   01/26/17 0900 - 83 20 191/79 100 %   01/26/17 0845 - 87 21 187/85 100 %   01/26/17 0830 - 92 22 (!) 201/94 100 %   01/26/17 0815 - 87 20 (!) 176/92 100 %   01/26/17 0745 - 83 17 181/67 100 % 01/26/17 0730 - 85 23 198/75 100 %   01/26/17 0728 - - - - 100 %   01/26/17 0715 - 84 15 196/87 100 %   01/26/17 0713 98 °F (36.7 °C) 94 26 (!) 221/93 100 %     100% on RA, indicating adequate oxygenation. EKG interpretation by ED Physician:  7:20 AM. Normal sinus rhythm with a rate of 85 bpm. Normal axis. No STEMI. 10:05 AM. Normal sinus rhythm with a rate of 90 bpm. No STEMI. X-Ray, CT or other radiology findings or impressions:  CTA CHEST W WO CONT   Final Result   IMPRESSION (per radiology):     1. Near nondiagnostic study for PE secondary to poor contrast bolus as above. No definite PE in mainstem pulmonary arteries. The lobar and smaller branches  are poorly seen for evaluation. If clinical concern of PE remains high, VQ scan  is advised. 2. Mild atelectatic changes of the lungs. No pulmonary consolidation or  infarction seen. 3. Diffuse thyroid enlargement again noted. Multiple hypodense lesions  throughout both lobes and warrant thyroid ultrasound for better evaluation. 4. Stable hepatic hypodense lesions. XR CHEST SNGL V   Final Result   IMPRESSION (per radiology):     1. Prominence of the cardiac silhouette. Suggestive of cardiomegaly vs.  pericardial effusion. 2. No infiltrate or consolidation. 3. Narrowing of the trachea. Possibly related to external compression. Recommend either cross-sectional imaging correlation or ultrasound correlation. US THYROID/PARATHYROID/SOFT TISS    (Results Pending)         Progress notes, Consult notes or additional Procedure notes:   11:21 AM I have discussed results of work up with patient. cxr reviewed and discussed with pt. No stridor on arrival or currently but given limited air movement exam limited. Pt appears more comfortable, abg reassuring but will obtain cta to rule out compression as well as PE as cause of her symptoms.      2:29 PM. Consult:  Discussed care with Dr. Thereasa Gowers, hospitalist. Standard discussion; including history of patients chief complaint, available diagnostic results, and treatment course. He agrees to admit the patient. 2:31 PM. Consult:  Discussed care with Dr. Tash Shen, pulmonology. Standard discussion; including history of patients chief complaint, available diagnostic results, and treatment course. She agrees to see the patient in consult. Disposition:  Diagnosis:   1. Acute exacerbation of chronic obstructive pulmonary disease (COPD) ([facility] [address]        Disposition: Admitted          Scribe Attestation:   Joanie Armando am scribing for and in the presence of Rosie Casey MD January 26, 2017 at 7:29 AM     Signed by: Cherie Drew, 01/26/17, 7:29 AM     Physician Attestation:   I personally performed the services described in this documentation, reviewed and edited the documentation which was dictated to the scribe in my presence, and it accurately records my words and actions.    Rosie Casey MD

## 2017-01-26 NOTE — IP AVS SNAPSHOT
Current Discharge Medication List  
  
Take these medications at their scheduled times Dose & Instructions Dispensing Information Comments Morning Noon Evening Bedtime ALPRAZolam 0.5 mg tablet Commonly known as:  Adalberto Amend Your next dose is: Today, Tomorrow Other:  ____________ Dose:  2.5 mg Take 5 Tabs by mouth two (2) times a day. Max Daily Amount: 5 mg. This information was told to current writer by patient. Patient fills script at [facility] on [facility]. Quantity:  40 Tab Refills:  0  
     
   
   
   
  
 amLODIPine 5 mg tablet Commonly known as:  Yuli Croon Your next dose is: Today, Tomorrow Other:  ____________ Dose:  5 mg Take 1 Tab by mouth daily. Quantity:  15 Tab Refills:  0  
     
   
   
   
  
 budesonide-formoterol 160-4.5 mcg/actuation HFA inhaler Commonly known as:  SYMBICORT Your next dose is: Today, Tomorrow Other:  ____________ Dose:  2 Puff Take 2 Puffs by inhalation two (2) times a day. Quantity:  1 Inhaler Refills:  3  
     
   
   
   
  
 bumetanide 0.5 mg tablet Commonly known as:  Geovanna Camejo Your next dose is: Today, Tomorrow Other:  ____________ Dose:  0.5 mg Take 1 Tab by mouth every fourty-eight (48) hours. Quantity:  15 Tab Refills:  0  
     
   
   
   
  
 doxycycline 100 mg capsule Commonly known as:  VIBRAMYCIN Your next dose is: Today, Tomorrow Other:  ____________ Dose:  100 mg Take 1 Cap by mouth every twelve (12) hours. Quantity:  14 Cap Refills:  0  
     
   
   
   
  
 famotidine 20 mg tablet Commonly known as:  PEPCID Your next dose is: Today, Tomorrow Other:  ____________ Dose:  20 mg Take 1 Tab by mouth daily. Quantity:  30 Tab Refills:  0 FLUoxetine 20 mg capsule Commonly known as:  PROzac  
   
 Your next dose is: Today, Tomorrow Other:  ____________ Dose:  60 mg Take 3 Caps by mouth daily. Quantity:  90 Cap Refills:  1  
     
   
   
   
  
 lisinopril 20 mg tablet Commonly known as:  Marie Shames Your next dose is: Today, Tomorrow Other:  ____________ Dose:  20 mg Take 1 Tab by mouth daily. Quantity:  15 Tab Refills:  2  
     
   
   
   
  
 montelukast 10 mg tablet Commonly known as:  SINGULAIR Your next dose is: Today, Tomorrow Other:  ____________ Dose:  10 mg Take 1 Tab by mouth nightly. Quantity:  30 Tab Refills:  2  
     
   
   
   
  
 nicotine 14 mg/24 hr patch Commonly known as:  General Escort Your next dose is: Today, Tomorrow Other:  ____________ Dose:  1 Patch 1 Patch by TransDERmal route daily for 30 days. Quantity:  30 Patch Refills:  0  
     
   
   
   
  
 senna-docusate 8.6-50 mg per tablet Commonly known as:  Sofie Distad Your next dose is: Today, Tomorrow Other:  ____________ Dose:  2 Tab Take 2 Tabs by mouth nightly. HOLD for loose stool. Quantity:  60 Tab Refills:  1  
     
   
   
   
  
 solifenacin 5 mg tablet Commonly known as:  VESIcare Your next dose is: Today, Tomorrow Other:  ____________ Dose:  5 mg Take 1 Tab by mouth daily. Quantity:  30 Tab Refills:  1  
     
   
   
   
  
 tiotropium 18 mcg inhalation capsule Commonly known as:  Darlina Forts Your next dose is: Today, Tomorrow Other:  ____________ Dose:  1 Cap Take 1 Cap by inhalation daily. Quantity:  30 Cap Refills:  0 Take these medications as needed Dose & Instructions Dispensing Information Comments Morning Noon Evening Bedtime * albuterol 90 mcg/actuation inhaler Commonly known as:  VENTOLIN HFA Your next dose is: Today, Tomorrow Other:  ____________ Dose:  2 Puff Take 2 Puffs by inhalation every six (6) hours as needed for Wheezing or Shortness of Breath. Quantity:  1 Inhaler Refills:  1  
     
   
   
   
  
 * albuterol sulfate 2.5 mg/0.5 mL Nebu nebulizer solution Commonly known as:  PROVENTIL;VENTOLIN Your next dose is: Today, Tomorrow Other:  ____________ Dose:  2.5 mg  
0.5 mL by Nebulization route every four (4) hours as needed for Wheezing for up to 60 days. One nebulizer treatment every 6 hours while awake x 5 days  then every 6 hours as needed for shortness of breath and/or wheezing. Quantity:  50 mL Refills:  1  
     
   
   
   
  
 oxyCODONE-acetaminophen  mg per tablet Commonly known as:  PERCOCET 10 Your next dose is: Today, Tomorrow Other:  ____________ Dose:  1 Tab Take 1 Tab by mouth every four (4) hours as needed. Max Daily Amount: 6 Tabs. Quantity:  40 Tab Refills:  0  
     
   
   
   
  
 * Notice: This list has 2 medication(s) that are the same as other medications prescribed for you. Read the directions carefully, and ask your doctor or other care provider to review them with you. Where to Get Your Medications Information about where to get these medications is not yet available ! Ask your nurse or doctor about these medications  
  albuterol 90 mcg/actuation inhaler  
 albuterol sulfate 2.5 mg/0.5 mL Nebu nebulizer solution ALPRAZolam 0.5 mg tablet  
 budesonide-formoterol 160-4.5 mcg/actuation HFA inhaler  
 doxycycline 100 mg capsule  
 oxyCODONE-acetaminophen  mg per tablet  
 tiotropium 18 mcg inhalation capsule

## 2017-01-26 NOTE — ED TRIAGE NOTES
pt brought in by EMS for respiratory distress recently discharged, pt give 2 gm mag, 125 mg solumedrol, 1 duonebs, continuous albuterol, diminshed lung sounds inspiratory and expiratory wheezing

## 2017-01-27 LAB
ALBUMIN SERPL BCP-MCNC: 3.1 G/DL (ref 3.4–5)
ALBUMIN/GLOB SERPL: 0.9 {RATIO} (ref 0.8–1.7)
ALP SERPL-CCNC: 102 U/L (ref 45–117)
ALT SERPL-CCNC: 20 U/L (ref 13–56)
ANION GAP BLD CALC-SCNC: 12 MMOL/L (ref 3–18)
AST SERPL W P-5'-P-CCNC: 5 U/L (ref 15–37)
ATRIAL RATE: 90 BPM
BASOPHILS # BLD AUTO: 0 K/UL (ref 0–0.1)
BASOPHILS # BLD: 0 % (ref 0–2)
BILIRUB SERPL-MCNC: 0.3 MG/DL (ref 0.2–1)
BUN SERPL-MCNC: 14 MG/DL (ref 7–18)
BUN/CREAT SERPL: 16 (ref 12–20)
CALCIUM SERPL-MCNC: 8.7 MG/DL (ref 8.5–10.1)
CALCULATED P AXIS, ECG09: 70 DEGREES
CALCULATED R AXIS, ECG10: 41 DEGREES
CALCULATED T AXIS, ECG11: 60 DEGREES
CHLORIDE SERPL-SCNC: 103 MMOL/L (ref 100–108)
CO2 SERPL-SCNC: 23 MMOL/L (ref 21–32)
CREAT SERPL-MCNC: 0.9 MG/DL (ref 0.6–1.3)
DIAGNOSIS, 93000: NORMAL
DIFFERENTIAL METHOD BLD: ABNORMAL
EOSINOPHIL # BLD: 0 K/UL (ref 0–0.4)
EOSINOPHIL NFR BLD: 0 % (ref 0–5)
ERYTHROCYTE [DISTWIDTH] IN BLOOD BY AUTOMATED COUNT: 13.6 % (ref 11.6–14.5)
EST. AVERAGE GLUCOSE BLD GHB EST-MCNC: NORMAL MG/DL
GLOBULIN SER CALC-MCNC: 3.3 G/DL (ref 2–4)
GLUCOSE BLD STRIP.AUTO-MCNC: 115 MG/DL (ref 70–110)
GLUCOSE BLD STRIP.AUTO-MCNC: 130 MG/DL (ref 70–110)
GLUCOSE BLD STRIP.AUTO-MCNC: 158 MG/DL (ref 70–110)
GLUCOSE SERPL-MCNC: 167 MG/DL (ref 74–99)
HBA1C MFR BLD: 4.9 % (ref 4.2–5.6)
HCT VFR BLD AUTO: 37 % (ref 35–45)
HGB BLD-MCNC: 12.5 G/DL (ref 12–16)
INR PPP: 1 (ref 0.8–1.2)
LYMPHOCYTES # BLD AUTO: 8 % (ref 21–52)
LYMPHOCYTES # BLD: 0.8 K/UL (ref 0.9–3.6)
MAGNESIUM SERPL-MCNC: 1.9 MG/DL (ref 1.8–2.4)
MCH RBC QN AUTO: 29.4 PG (ref 24–34)
MCHC RBC AUTO-ENTMCNC: 33.8 G/DL (ref 31–37)
MCV RBC AUTO: 87.1 FL (ref 74–97)
MONOCYTES # BLD: 0.1 K/UL (ref 0.05–1.2)
MONOCYTES NFR BLD AUTO: 1 % (ref 3–10)
NEUTS SEG # BLD: 9 K/UL (ref 1.8–8)
NEUTS SEG NFR BLD AUTO: 91 % (ref 40–73)
P-R INTERVAL, ECG05: 134 MS
PLATELET # BLD AUTO: 234 K/UL (ref 135–420)
PMV BLD AUTO: 11.1 FL (ref 9.2–11.8)
POTASSIUM SERPL-SCNC: 3.6 MMOL/L (ref 3.5–5.5)
PROT SERPL-MCNC: 6.4 G/DL (ref 6.4–8.2)
PROTHROMBIN TIME: 12.8 SEC (ref 11.5–15.2)
Q-T INTERVAL, ECG07: 352 MS
QRS DURATION, ECG06: 82 MS
QTC CALCULATION (BEZET), ECG08: 430 MS
RBC # BLD AUTO: 4.25 M/UL (ref 4.2–5.3)
SODIUM SERPL-SCNC: 138 MMOL/L (ref 136–145)
VENTRICULAR RATE, ECG03: 90 BPM
WBC # BLD AUTO: 10 K/UL (ref 4.6–13.2)

## 2017-01-27 PROCEDURE — 83036 HEMOGLOBIN GLYCOSYLATED A1C: CPT | Performed by: EMERGENCY MEDICINE

## 2017-01-27 PROCEDURE — 74011250636 HC RX REV CODE- 250/636: Performed by: EMERGENCY MEDICINE

## 2017-01-27 PROCEDURE — 74011000250 HC RX REV CODE- 250: Performed by: EMERGENCY MEDICINE

## 2017-01-27 PROCEDURE — 74011636637 HC RX REV CODE- 636/637: Performed by: EMERGENCY MEDICINE

## 2017-01-27 PROCEDURE — 74011250637 HC RX REV CODE- 250/637: Performed by: EMERGENCY MEDICINE

## 2017-01-27 PROCEDURE — 85610 PROTHROMBIN TIME: CPT | Performed by: EMERGENCY MEDICINE

## 2017-01-27 PROCEDURE — 65660000000 HC RM CCU STEPDOWN

## 2017-01-27 PROCEDURE — 83735 ASSAY OF MAGNESIUM: CPT | Performed by: EMERGENCY MEDICINE

## 2017-01-27 PROCEDURE — 80053 COMPREHEN METABOLIC PANEL: CPT | Performed by: EMERGENCY MEDICINE

## 2017-01-27 PROCEDURE — 85025 COMPLETE CBC W/AUTO DIFF WBC: CPT | Performed by: EMERGENCY MEDICINE

## 2017-01-27 PROCEDURE — 82962 GLUCOSE BLOOD TEST: CPT

## 2017-01-27 PROCEDURE — 77010033678 HC OXYGEN DAILY

## 2017-01-27 PROCEDURE — 94640 AIRWAY INHALATION TREATMENT: CPT

## 2017-01-27 RX ORDER — AMLODIPINE BESYLATE 10 MG/1
10 TABLET ORAL DAILY
Status: DISCONTINUED | OUTPATIENT
Start: 2017-01-28 | End: 2017-01-30 | Stop reason: HOSPADM

## 2017-01-27 RX ORDER — ALPRAZOLAM 0.5 MG/1
2.5 TABLET ORAL 2 TIMES DAILY
Status: ON HOLD | COMMUNITY
End: 2017-01-30

## 2017-01-27 RX ORDER — ALPRAZOLAM 0.5 MG/1
0.5 TABLET ORAL
Status: DISCONTINUED | OUTPATIENT
Start: 2017-01-27 | End: 2017-01-30 | Stop reason: HOSPADM

## 2017-01-27 RX ORDER — ALPRAZOLAM 0.25 MG/1
0.25 TABLET ORAL
Status: DISCONTINUED | OUTPATIENT
Start: 2017-01-27 | End: 2017-01-27

## 2017-01-27 RX ADMIN — Medication 250 MG: at 09:30

## 2017-01-27 RX ADMIN — SOLIFENACIN SUCCINATE 5 MG: 5 TABLET, FILM COATED ORAL at 09:30

## 2017-01-27 RX ADMIN — ALPRAZOLAM 0.5 MG: 0.5 TABLET ORAL at 21:34

## 2017-01-27 RX ADMIN — AMLODIPINE BESYLATE 5 MG: 5 TABLET ORAL at 09:15

## 2017-01-27 RX ADMIN — METHYLPREDNISOLONE SODIUM SUCCINATE 60 MG: 125 INJECTION, POWDER, FOR SOLUTION INTRAMUSCULAR; INTRAVENOUS at 00:14

## 2017-01-27 RX ADMIN — IPRATROPIUM BROMIDE AND ALBUTEROL SULFATE 3 ML: .5; 3 SOLUTION RESPIRATORY (INHALATION) at 09:15

## 2017-01-27 RX ADMIN — POLYETHYLENE GLYCOL 3350 17 G: 17 POWDER, FOR SOLUTION ORAL at 09:16

## 2017-01-27 RX ADMIN — ALPRAZOLAM 0.5 MG: 0.5 TABLET ORAL at 15:34

## 2017-01-27 RX ADMIN — OXYCODONE HYDROCHLORIDE AND ACETAMINOPHEN 1 TABLET: 10; 325 TABLET ORAL at 22:06

## 2017-01-27 RX ADMIN — IPRATROPIUM BROMIDE AND ALBUTEROL SULFATE 3 ML: .5; 3 SOLUTION RESPIRATORY (INHALATION) at 20:12

## 2017-01-27 RX ADMIN — OXYCODONE HYDROCHLORIDE AND ACETAMINOPHEN 1 TABLET: 10; 325 TABLET ORAL at 18:55

## 2017-01-27 RX ADMIN — IPRATROPIUM BROMIDE AND ALBUTEROL SULFATE 3 ML: .5; 3 SOLUTION RESPIRATORY (INHALATION) at 00:14

## 2017-01-27 RX ADMIN — IPRATROPIUM BROMIDE AND ALBUTEROL SULFATE 3 ML: .5; 3 SOLUTION RESPIRATORY (INHALATION) at 12:44

## 2017-01-27 RX ADMIN — OXYCODONE HYDROCHLORIDE AND ACETAMINOPHEN 1 TABLET: 10; 325 TABLET ORAL at 06:48

## 2017-01-27 RX ADMIN — LISINOPRIL 10 MG: 10 TABLET ORAL at 09:15

## 2017-01-27 RX ADMIN — METHYLPREDNISOLONE SODIUM SUCCINATE 60 MG: 125 INJECTION, POWDER, FOR SOLUTION INTRAMUSCULAR; INTRAVENOUS at 06:48

## 2017-01-27 RX ADMIN — DOXYCYCLINE HYCLATE 100 MG: 100 CAPSULE ORAL at 21:31

## 2017-01-27 RX ADMIN — IPRATROPIUM BROMIDE AND ALBUTEROL SULFATE 3 ML: .5; 3 SOLUTION RESPIRATORY (INHALATION) at 04:30

## 2017-01-27 RX ADMIN — OXYCODONE HYDROCHLORIDE AND ACETAMINOPHEN 1 TABLET: 10; 325 TABLET ORAL at 01:52

## 2017-01-27 RX ADMIN — BUMETANIDE 0.5 MG: 1 TABLET ORAL at 09:30

## 2017-01-27 RX ADMIN — FLUOXETINE 20 MG: 20 CAPSULE ORAL at 09:15

## 2017-01-27 RX ADMIN — IPRATROPIUM BROMIDE AND ALBUTEROL SULFATE 3 ML: .5; 3 SOLUTION RESPIRATORY (INHALATION) at 15:37

## 2017-01-27 RX ADMIN — MONTELUKAST SODIUM 10 MG: 10 TABLET, FILM COATED ORAL at 21:31

## 2017-01-27 RX ADMIN — INSULIN LISPRO 2 UNITS: 100 INJECTION, SOLUTION INTRAVENOUS; SUBCUTANEOUS at 08:30

## 2017-01-27 RX ADMIN — OXYCODONE HYDROCHLORIDE AND ACETAMINOPHEN 1 TABLET: 10; 325 TABLET ORAL at 13:11

## 2017-01-27 RX ADMIN — Medication 250 MG: at 17:23

## 2017-01-27 RX ADMIN — ENOXAPARIN SODIUM 40 MG: 40 INJECTION SUBCUTANEOUS at 17:26

## 2017-01-27 RX ADMIN — DOXYCYCLINE HYCLATE 100 MG: 100 CAPSULE ORAL at 09:15

## 2017-01-27 RX ADMIN — FAMOTIDINE 20 MG: 20 TABLET ORAL at 09:15

## 2017-01-27 RX ADMIN — IPRATROPIUM BROMIDE AND ALBUTEROL SULFATE 3 ML: .5; 3 SOLUTION RESPIRATORY (INHALATION) at 23:59

## 2017-01-27 RX ADMIN — STANDARDIZED SENNA CONCENTRATE AND DOCUSATE SODIUM 2 TABLET: 8.6; 5 TABLET, FILM COATED ORAL at 21:31

## 2017-01-27 NOTE — ED NOTES
Hourly rounding-pt resting on bed, side rails up, call bell in reach, no complaints at this time, pt still awaiting a stepdown bed.

## 2017-01-27 NOTE — PROGRESS NOTES
Care Management Interventions  PCP Verified by CM: Yes  Palliative Care Consult (Criteria: CHF and RRAT>21): No  Reason for No Palliative Care Consult: Other (see comment)  Mode of Transport at Discharge: Self  Transition of Care Consult (CM Consult): Discharge Planning  MyChart Signup: No  Discharge Durable Medical Equipment: No  Health Maintenance Reviewed: Yes  Physical Therapy Consult: No  Occupational Therapy Consult: No  Speech Therapy Consult: No  Current Support Network: Own Home (lives with her son)  Confirm Follow Up Transport: Other (see comment)  Plan discussed with Pt/Family/Caregiver: Yes  Discharge Location  Discharge Placement:  (tbd)     Patient 45years old female admitted to [facility] with sob, acute asthma exacerbation. Saw the patient and her son in room, she is AAO  X 4, open to conversation. Patient says she lives with her son, she uses O2 at home, will need neb machine. Patient also says she is in process of getting power chair; she provided contact info of case deisy-mrs. [phone] ext [phone], who helps her in obtaining power chair. Patient uses medicaid transport to get to medical appointments. Patient asked or she can attend \"rehab\" at [facility]. She was informed her insurance has to authorize her for skilled care in snf. She was provided with snf list to review. Patient says she is in and out of hospital all the time. She was offered option of placement to long term care facility, she stated to think about it. CM will continue to follow.  Aayush Nicole rn, cm

## 2017-01-27 NOTE — ED NOTES
Hourly rounding-pt resting in bed, side rails up, family and friends at bedside, vitals stable, no needs at this time.

## 2017-01-27 NOTE — CONSULTS
Pulmonology Progress Note    Subjective:     Jessica Georges is awake, alert and on no pressors, breathing comfortably and without complaints of pain. Patient says she is feeling better     Current Facility-Administered Medications   Medication Dose Route Frequency    albuterol (PROVENTIL VENTOLIN) nebulizer solution 2.5 mg  2.5 mg Nebulization Q2H PRN    albuterol-ipratropium (DUO-NEB) 2.5 MG-0.5 MG/3 ML  3 mL Nebulization Q4H RT    bumetanide (BUMEX) tablet 0.5 mg  0.5 mg Oral Q48H    oxyCODONE-acetaminophen (PERCOCET 10)  mg per tablet 1 Tab  1 Tab Oral Q6H PRN    amLODIPine (NORVASC) tablet 5 mg  5 mg Oral DAILY    budesonide-formoterol (SYMBICORT) 160-4.5 mcg/actuation HFA inhaler 2 Puff  2 Puff Inhalation BID RT    lisinopril (PRINIVIL, ZESTRIL) tablet 10 mg  10 mg Oral DAILY    famotidine (PEPCID) tablet 20 mg  20 mg Oral DAILY    montelukast (SINGULAIR) tablet 10 mg  10 mg Oral QHS    senna-docusate (PERICOLACE) 8.6-50 mg per tablet 2 Tab  2 Tab Oral QHS    solifenacin (VESICARE) tablet 5 mg  5 mg Oral DAILY    polyethylene glycol (MIRALAX) packet 17 g  17 g Oral DAILY    FLUoxetine (PROzac) capsule 20 mg  20 mg Oral DAILY    insulin lispro (HUMALOG) injection   SubCUTAneous AC&HS    glucose chewable tablet 16 g  4 Tab Oral PRN    glucagon (GLUCAGEN) injection 1 mg  1 mg IntraMUSCular PRN    dextrose (D50W) injection syrg 12.5-25 g  25-50 mL IntraVENous PRN    naloxone (NARCAN) injection 0.4 mg  0.4 mg IntraVENous PRN    enoxaparin (LOVENOX) injection 40 mg  40 mg SubCUTAneous Q24H    doxycycline (VIBRAMYCIN) capsule 100 mg  100 mg Oral Q12H    Saccharomyces boulardii (FLORASTOR) capsule 250 mg  250 mg Oral BID    hydrALAZINE (APRESOLINE) 20 mg/mL injection 10 mg  10 mg IntraVENous Q6H PRN     Current Outpatient Prescriptions   Medication Sig    oxyCODONE-acetaminophen (PERCOCET 10)  mg per tablet Take 1 Tab by mouth every eight (8) hours as needed.  Max Daily Amount: 3 Tabs.    bumetanide (BUMEX) 0.5 mg tablet Take 1 Tab by mouth every fourty-eight (48) hours.  nicotine (NICODERM CQ) 14 mg/24 hr patch 1 Patch by TransDERmal route daily for 30 days.  albuterol sulfate (PROVENTIL;VENTOLIN) 2.5 mg/0.5 mL nebu nebulizer solution One nebulizer treatment every 6 hours while awake x 5 days  then every 6 hours as needed for shortness of breath and/or wheezing.  albuterol (VENTOLIN HFA) 90 mcg/actuation inhaler Take 2 Puffs by inhalation every six (6) hours as needed for Wheezing or Shortness of Breath.  fluticasone-salmeterol (ADVAIR DISKUS) 250-50 mcg/dose diskus inhaler Take 1 Puff by inhalation every twelve (12) hours.  lisinopril (PRINIVIL, ZESTRIL) 20 mg tablet Take 1 Tab by mouth daily.  amLODIPine (NORVASC) 5 mg tablet Take 1 Tab by mouth daily.  tiotropium (SPIRIVA) 18 mcg inhalation capsule Take 1 Cap by inhalation daily.  famotidine (PEPCID) 20 mg tablet Take 1 Tab by mouth daily.  senna-docusate (PERICOLACE) 8.6-50 mg per tablet Take 2 Tabs by mouth nightly. HOLD for loose stool.  FLUoxetine (PROZAC) 20 mg capsule Take 3 Caps by mouth daily.  montelukast (SINGULAIR) 10 mg tablet Take 1 Tab by mouth nightly.  solifenacin (VESICARE) 5 mg tablet Take 1 Tab by mouth daily.  polyethylene glycol (MIRALAX) 17 gram packet Take 1 Packet by mouth daily. Review of Systems:  Pertinent items are noted in HPI. Objective:     Visit Vitals    /71    Pulse 84    Temp 98 °F (36.7 °C)    Resp 21    Ht 4' 11\" (1.499 m)    Wt 124.2 kg (273 lb 14.4 oz)    SpO2 100%    BMI 55.32 kg/m2    O2 Flow Rate (L/min): 3 l/min O2 Device: Nasal cannula            Physical Exam:   Visit Vitals    /71    Pulse 84    Temp 98 °F (36.7 °C)    Resp 21    Ht 4' 11\" (1.499 m)    Wt 124.2 kg (273 lb 14.4 oz)    SpO2 100%    BMI 55.32 kg/m2     General:  Alert, cooperative, no distress, appears stated age.    Head:  Normocephalic, without obvious abnormality, atraumatic. Eyes:  Conjunctivae/corneas clear. PERRL, EOMs intact. Fundi benign. Ears:  Normal TMs and external ear canals both ears. Nose: Nares normal. Septum midline. Mucosa normal. No drainage or sinus tenderness. Throat: Lips, mucosa, and tongue normal. Teeth and gums normal.   Neck: Supple, symmetrical, trachea midline, no adenopathy, thyroid: no enlargement/tenderness/nodules, no carotid bruit and no JVD. Back:   Symmetric, no curvature. ROM normal. No CVA tenderness. Lungs:   Clear to auscultation bilaterally. Chest wall:  No tenderness or deformity. Heart:  Regular rate and rhythm, S1, S2 normal, no murmur, click, rub or gallop. Breast Exam:  No tenderness, masses, or nipple abnormality. Abdomen:   Soft, non-tender. Bowel sounds normal. No masses,  No organomegaly. Genitalia:  Normal female without lesion, discharge or tenderness. Rectal:  Normal tone,  no masses or tenderness  Guaiac negative stool. Extremities: Extremities normal, atraumatic, no cyanosis or edema. Pulses: 2+ and symmetric all extremities. Skin: Skin color, texture, turgor normal. No rashes or lesions. Lymph nodes: Cervical, supraclavicular, and axillary nodes normal.   Neurologic: CNII-XII intact. Normal strength, sensation and reflexes throughout.        Data Review:    Recent Results (from the past 24 hour(s))   DRUG SCREEN, URINE    Collection Time: 01/26/17  8:43 AM   Result Value Ref Range    BENZODIAZEPINE NEGATIVE  NEG      BARBITURATES NEGATIVE  NEG      THC (TH-CANNABINOL) NEGATIVE  NEG      OPIATES POSITIVE (A) NEG      PCP(PHENCYCLIDINE) NEGATIVE  NEG      COCAINE NEGATIVE  NEG      AMPHETAMINE NEGATIVE  NEG      METHADONE NEGATIVE  NEG      HDSCOM (NOTE)    HCG URINE, QL    Collection Time: 01/26/17  8:43 AM   Result Value Ref Range    HCG urine, Ql. NEGATIVE  NEG     EKG, 12 LEAD, SUBSEQUENT    Collection Time: 01/26/17 10:01 AM   Result Value Ref Range    Ventricular Rate 90 BPM Atrial Rate 90 BPM    P-R Interval 134 ms    QRS Duration 82 ms    Q-T Interval 352 ms    QTC Calculation (Bezet) 430 ms    Calculated P Axis 70 degrees    Calculated R Axis 41 degrees    Calculated T Axis 60 degrees    Diagnosis       Normal sinus rhythm  Possible Left atrial enlargement  Nonspecific T wave abnormality  Abnormal ECG  When compared with ECG of 26-JAN-2017 07:19,  Nonspecific T wave abnormality, worse in Lateral leads  Confirmed by Barber Aggarwal ([id number]) on 1/27/2017 6:31:07 AM     CARDIAC PANEL,(CK, CKMB & TROPONIN)    Collection Time: 01/26/17 10:09 AM   Result Value Ref Range    CK 35 26 - 192 U/L    CK - MB 0.6 0.5 - 3.6 ng/ml    CK-MB Index 1.7 0.0 - 4.0 %    Troponin-I, Qt. <0.02 0.0 - 0.045 NG/ML   TSH 3RD GENERATION    Collection Time: 01/26/17 10:09 AM   Result Value Ref Range    TSH 0.09 (L) 0.36 - 3.74 uIU/mL   T4, FREE    Collection Time: 01/26/17 10:09 AM   Result Value Ref Range    T4, Free 1.1 0.7 - 1.5 NG/DL   GLUCOSE, POC    Collection Time: 01/26/17  9:28 PM   Result Value Ref Range    Glucose (POC) 151 (H) 70 - 110 mg/dL   CBC WITH AUTOMATED DIFF    Collection Time: 01/27/17  4:29 AM   Result Value Ref Range    WBC 10.0 4.6 - 13.2 K/uL    RBC 4.25 4.20 - 5.30 M/uL    HGB 12.5 12.0 - 16.0 g/dL    HCT 37.0 35.0 - 45.0 %    MCV 87.1 74.0 - 97.0 FL    MCH 29.4 24.0 - 34.0 PG    MCHC 33.8 31.0 - 37.0 g/dL    RDW 13.6 11.6 - 14.5 %    PLATELET 274 497 - 685 K/uL    MPV 11.1 9.2 - 11.8 FL    NEUTROPHILS 91 (H) 40 - 73 %    LYMPHOCYTES 8 (L) 21 - 52 %    MONOCYTES 1 (L) 3 - 10 %    EOSINOPHILS 0 0 - 5 %    BASOPHILS 0 0 - 2 %    ABS. NEUTROPHILS 9.0 (H) 1.8 - 8.0 K/UL    ABS. LYMPHOCYTES 0.8 (L) 0.9 - 3.6 K/UL    ABS. MONOCYTES 0.1 0.05 - 1.2 K/UL    ABS. EOSINOPHILS 0.0 0.0 - 0.4 K/UL    ABS.  BASOPHILS 0.0 0.0 - 0.1 K/UL    DF AUTOMATED     METABOLIC PANEL, COMPREHENSIVE    Collection Time: 01/27/17  4:29 AM   Result Value Ref Range    Sodium 138 136 - 145 mmol/L    Potassium 3.6 3.5 - 5.5 mmol/L    Chloride 103 100 - 108 mmol/L    CO2 23 21 - 32 mmol/L    Anion gap 12 3.0 - 18 mmol/L    Glucose 167 (H) 74 - 99 mg/dL    BUN 14 7.0 - 18 MG/DL    Creatinine 0.90 0.6 - 1.3 MG/DL    BUN/Creatinine ratio 16 12 - 20      GFR est AA >60 >60 ml/min/1.73m2    GFR est non-AA >60 >60 ml/min/1.73m2    Calcium 8.7 8.5 - 10.1 MG/DL    Bilirubin, total 0.3 0.2 - 1.0 MG/DL    ALT 20 13 - 56 U/L    AST 5 (L) 15 - 37 U/L    Alk.  phosphatase 102 45 - 117 U/L    Protein, total 6.4 6.4 - 8.2 g/dL    Albumin 3.1 (L) 3.4 - 5.0 g/dL    Globulin 3.3 2.0 - 4.0 g/dL    A-G Ratio 0.9 0.8 - 1.7     PROTHROMBIN TIME + INR    Collection Time: 01/27/17  4:29 AM   Result Value Ref Range    Prothrombin time 12.8 11.5 - 15.2 sec    INR 1.0 0.8 - 1.2     MAGNESIUM    Collection Time: 01/27/17  4:29 AM   Result Value Ref Range    Magnesium 1.9 1.8 - 2.4 mg/dL   HEMOGLOBIN A1C WITH EAG    Collection Time: 01/27/17  4:29 AM   Result Value Ref Range    Hemoglobin A1c 4.9 4.2 - 5.6 %    Est. average glucose CANNOT BE CALCULATED mg/dL       Images: normal    Assessment:     Active Problems:    Shortness of breath (3/5/2016)      Asthma exacerbation (1/26/2017)        Plan:     Continue to observe off steroids  Continue inhalers  Patient can bring her home bipap to be checked out  Needs to be on bipap at night atleast for her sleep apnea

## 2017-01-27 NOTE — ROUTINE PROCESS
TRANSFER - IN REPORT:    Verbal report received from Estrella Oakes RN(name) on Calvin Muller  being received from ED(unit) for routine progression of care      Report consisted of patients Situation, Background, Assessment and   Recommendations(SBAR). Information from the following report(s) SBAR, Kardex and MAR was reviewed with the receiving nurse. Opportunity for questions and clarification was provided. Assessment completed upon patients arrival to unit and care assumed.

## 2017-01-27 NOTE — ED NOTES
Hourly rounding-pt resting in bed, call bell in reach, side rails up, vitals stable, pt asking for pain medication at this time for 8/10 chest pain.

## 2017-01-27 NOTE — ED NOTES
Hourly rounding-pt resting on bed, call bell in reach, vitals stable, no complaints at this time, pt finishing breathing treatment at this time.

## 2017-01-27 NOTE — ED NOTES
Hourly rounding-pt resting in bed, side rails up, call bell in reach, pt updated on plan of care, warm blankets provided.

## 2017-01-27 NOTE — ED NOTES
TRANSFER - OUT REPORT:    Verbal report given to ROSALINE Joshi (name) on Jessica Arriola  being transferred to (unit) for routine progression of care       Report consisted of patients Situation, Background, Assessment and   Recommendations(SBAR). Information from the following report(s) SBAR, ED Summary and MAR was reviewed with the receiving nurse. Lines:   Peripheral IV 01/26/17 Left Hand (Active)   Site Assessment Clean, dry, & intact 1/26/2017  9:04 AM   Phlebitis Assessment 0 1/26/2017  9:04 AM   Infiltration Assessment 0 1/26/2017  9:04 AM   Dressing Status Clean, dry, & intact 1/26/2017  9:04 AM   Hub Color/Line Status Pink 1/26/2017  9:04 AM       Peripheral IV 01/27/17 Right Antecubital (Active)   Site Assessment Clean, dry, & intact 1/27/2017  2:08 AM   Phlebitis Assessment 0 1/27/2017  2:08 AM   Infiltration Assessment 0 1/27/2017  2:08 AM   Dressing Status Clean, dry, & intact 1/27/2017  2:08 AM   Dressing Type Transparent;Tape 1/27/2017  2:08 AM   Hub Color/Line Status Patent;Pink;Flushed 1/27/2017  2:08 AM        Opportunity for questions and clarification was provided.       Patient transported with:   Monitor  O2 @ 3 liters  Registered Nurse

## 2017-01-27 NOTE — CDMP QUERY
Please clarify re: asthma  Intermittent or Persistent     QUESTIONS?    Shade Valero RN BS CCDS [phone]

## 2017-01-27 NOTE — ED NOTES
Hourly rounding-pt in bed resting, vital signs stable, call bell in reach, side rails up, pt asking for pain medicine.

## 2017-01-27 NOTE — H&P
[address] [place] [address]    Name:  Chace Hinojosa  MR#:  [id number]  :  1965  Account #:  [de-identified]  Date of Adm:  2017      CHIEF COMPLAINT: Shortness of breath. HISTORY OF PRESENT ILLNESS: This is a 57-year-old [facility]  American female who presented to the ER with respiratory distress. The patient was brought in by the paramedics. The patient had  recently been discharged from [facility] with  similar problems. The patient states that she got home and very  quickly she started feeling worse and paramedics were called. The  patient denies smoking herself, but states that she may have been  exposed to secondhand smoke at home. The patient has had various  admissions to various facilities with respiratory distress, during a recent  admission to [facility], the patient was intubated. On arrival to the ER,  the patient was initially placed on BiPAP. When I saw the patient, the  patient was off the BiPAP. The patient states that her breathing has  been getting worse for the last few weeks. The patient complains of  cough, but does not bring out any phlegm. The patient denies any  chest pain, any palpitations, any nausea or vomiting. No history of any  fever or chills. No history of any headaches, numbness, or focal  weakness. No history of any abdominal pain, urinary complaints,  diarrhea or rectal bleeding. No history of any leg swelling. No history of  any rash. The patient denies any illicit drug use. PAST MEDICAL HISTORY: Significant for recurrent asthma  exacerbations and prior history of respiratory failure, chronic hypoxic  respiratory failure on home oxygen 2 liters, obstructive sleep apnea,  CPAP at night, hypertension, history of polysubstance abuse,  constipation. There is some mention of drug-seeking behavior in the  chart. Chronic diastolic congestive heart failure. PAST SURGICAL HISTORY: The patient denies.     ALLERGIES: NO KNOWN DRUG ALLERGIES. SOCIAL HISTORY: The patient has a prior history of using cocaine  and heroin, but last use was many years ago, as per the patient. The  patient drinks alcohol rarely. FAMILY HISTORY: Reviewed and not contributory in this setting. REVIEW OF SYSTEMS: Apart from complaints mentioned above, a  complete review of systems was done and is negative. PRIOR TO ADMISSION MEDICATIONS: As listed in the chart include:  1. Albuterol nebulizer as needed. 2. Spiriva 1 capsule inhaled daily. 3. Prozac 20 mg p.o. daily. 4. Percocet as needed. 5. Bumex 0.5 mg tablet every other day. 6. Advair Diskus 250/50 one puff inhaled twice daily. 7. Lisinopril 20 mg p.o. daily. 8. Norvasc 5 mg p.o. daily. 9. Pepcid 20 mg p.o. daily. 10. Radha-Colace 2 tablets p.o. daily at night. 11. MiraLax 17 g p.o. daily. 12. VESIcare 5 mg p.o. daily. 13. Singular 10 mg p.o. daily. PHYSICAL EXAMINATION  VITAL SIGNS: Today show temperature of 98, pulse rate of 90, blood  pressure 180/86, respiratory rate of 21. Oxygen saturations of 100%. GENERAL: This is a 13-year-old female, currently sitting in bed,  appears a little short of breath. HEAD: Normocephalic, atraumatic. EYES: Pupils are reactive to light. EARS, NOSE AND THROAT: No ear or nasal discharge is seen. Mucous membranes are moist.  NECK: Supple. No JVD. No carotid bruit. LUNGS: Decreased air entry is noted in all lung fields. Some wheezing  is noted. HEART: S1, S2 were heard. Regular rate and rhythm. ABDOMEN: Soft. Bowel sounds positive. Obese, nontender,  nondistended. EXTREMITIES: No lower extremity edema is noted. Pulses are 1+  bilaterally equal.  NEUROLOGIC: The patient is awake, alert and oriented x3. No focal  neurological deficit was identified. LABORATORY DATA: Today show a WBC count of 10.1, hemoglobin  13.3, hematocrit 40.0, platelet count of 082.  Sodium 142, potassium  3.6, chloride of 107, CO2 24, glucose 126, BUN 13, creatinine 0.86.  First set of troponin is negative. Pregnancy test was negative. Urine  drug screen was positive for opioids. TSH is 0.09, free T4 1.1. Test for  influenza was negative. ABG showed a pH of 7.37, pCO2 of 42, pO2  165, bicarbonate 24.9. Oxygen saturation 99. CT scan of the chest was done, which was reported by Radiology as  near nondiagnostic study for PE. Mild atelectatic changes in the lungs,  no pulmonary consolidation or infarction seen. Diffuse thyroid  enlargement, multiple hypodense lesions throughout both lobes and  warrant thyroid ultrasound for further evaluation. Ultrasound of the thyroid was also done, which was reported by  Radiology as bilateral thyroid nodules. Radiology has recommended a  FNA for the right thyroid nodule as well as the left thyroid nodule. EKG, as per my reading, showed sinus rhythm at the rate of 90 beats  per minute. Nonspecific ST-T changes were noted. EKG was read  myself. IMPRESSION  1. Acute respiratory distress requiring BiPAP. 2. Chronic hypoxic respiratory failure on home oxygen. 3. Acute exacerbation of asthma on top of moderate to severe persistent asthma  at baseline. 4. Thyromegaly. 5. Obstructive sleep apnea. 6. Morbid obesity, body mass index greater than 40.  7. Uncontrolled hypertension. 8. Constipation. 9. Chronic diastolic congestive heart failure, which is compensated. PLAN: The patient will be admitted to step-down, BiPAP as needed  and at bedtime. Pulmonary has been consulted. The patient will be  continued on oxygen, bronchodilators, steroids. We will add a course  of doxycycline. Sputum cultures have been requested. The patient has  been placed on the bronchial hygiene protocol. Regarding the  thyromegaly, Endocrine has been consulted. I have discussed with Dr. Robe Sanchez. Blood pressure will be monitored. We will use p.r.n.  medications along with Norvasc and lisinopril. The patient will be  continued on a bowel regimen.  The patient will be on DVT prophylaxis. The rest depending on the patient's further hospital course. Plan of  care was discussed with the patient and she verbalized understanding  and agreed.  The patient wishes to be a MD Eric Bhagat / [email or web address].Ryan  D:  01/26/2017   18:35  T:  01/26/2017   22:03  Job #:  [id number]

## 2017-01-27 NOTE — ROUTINE PROCESS
1200 - Received patient from ED, patient agitated, wants her pain medication and wants her Xanax. Patient stated \"I take two 10 percocets each time, and 2.5 xanax total of 5 of xanax. \"  Asked patient where she refills her xanax, patient stated:  \"I got the last prescription from [address] [facility] [address] on [address] [facility], but I will get this prescription to go home from here. \"  Patient has not been prescribed Xanax at this time. Patient also has general  chicken at bedside with two large quart size iced teas. Discussed with patient her diet, cardiac regular, noncompliant. Patient stated:  \"I will eat what I want, I am on a regular diet, Dr. Fanta Abrams said so. \"  Patient stable, bed in locked position, call bell and phone within reach. [address], [facility] in pharmacy asked about the Xanax. Need to follow-up with [facility] if this dosage of medication is correct. 26 - Dr. Fanta Abrams on floor will discuss with patient and place orders if needed. Discussed with Dr. Fanta Abrams patient's diet and patient is on a cardiac regular diet. 200 - Paged Dr. Saba Franklin for patient, pain medication patient states:  \"I take 2 each 10 percocet every 4 hours\". Patient is agitated. No response. 1 - Paged Dr. Saba Franklin for patient. No response at this time. 2000 - Bedside shift change report given to Jose Mancia RN (oncoming nurse) by Charleen Beaver RN (offgoing nurse). Report included the following information SBAR, Kardex and MAR. Patient was outside of room talking to [facility], [address] and Madelyn WAGGONER. Trying to get patient to go back to bed, finally patient got back in bed. Patient was discussing her pain medication.

## 2017-01-27 NOTE — CONSULTS
Pulmonology Consult    Subjective:   Consult Note: 1/27/2017 8:13 AM    This patient has been seen and evaluated at the request of Dr. Telma Sewell. Patient is a 45 y.o. female admitted with shortness of breath. This is a 50[facility]-old [facility] female who presented to the ER with respiratory distress. The patient was brought in by the paramedics. The patient had  recently been discharged from [facility] with  similar problems. The patient states that she got home and very  quickly she started feeling worse and paramedics were called. The  patient denies smoking herself, but states that she may have been  exposed to secondhand smoke at home. The patient has had various  admissions to various facilities with respiratory distress, during a recent  admission to [facility], the patient was intubated. On arrival to the ER,  the patient was initially placed on BiPAP. When I saw the patient, the  patient was off the BiPAP. The patient states that her breathing has  been getting worse for the last few weeks. The patient complains of  cough, but does not bring out any phlegm. The patient denies any  chest pain, any palpitations, any nausea or vomiting. No history of any  fever or chills. No history of any headaches, numbness, or focal  weakness. No history of any abdominal pain, urinary complaints,  diarrhea or rectal bleeding. No history of any leg swelling. No history of  any rash. The patient denies any illicit drug use. Past Medical History   Diagnosis Date    Asthma     Chronic obstructive pulmonary disease ([facility] [address]     Endocrine disease      thyroid issues    Gastrointestinal disorder      \"blockage in my stomach\"    Hypertension      History reviewed. No pertinent past surgical history. History reviewed. No pertinent family history.   Social History   Substance Use Topics    Smoking status: Never Smoker    Smokeless tobacco: Never Used    Alcohol use Yes      Comment: socially Current Facility-Administered Medications   Medication Dose Route Frequency Provider Last Rate Last Dose    albuterol (PROVENTIL VENTOLIN) nebulizer solution 2.5 mg  2.5 mg Nebulization Q2H PRN Shilpa Perez MD        albuterol-ipratropium (DUO-NEB) 2.5 MG-0.5 MG/3 ML  3 mL Nebulization Q4H RT Shilpa Perez MD   3 mL at 01/27/17 0430    methylPREDNISolone (PF) (SOLU-MEDROL) injection 60 mg  60 mg IntraVENous Q6H Solitario Rizo MD   60 mg at 01/27/17 0648    bumetanide (BUMEX) tablet 0.5 mg  0.5 mg Oral Q48H Shilpa Perez MD        oxyCODONE-acetaminophen (PERCOCET 10)  mg per tablet 1 Tab  1 Tab Oral Q6H PRN Shilpa Perez MD   1 Tab at 01/27/17 0648    amLODIPine (NORVASC) tablet 5 mg  5 mg Oral DAILY Shilpa Perez MD        budesonide-formoterol (SYMBICORT) 160-4.5 mcg/actuation HFA inhaler 2 Puff  2 Puff Inhalation BID RT Shilpa Perez MD   Stopped at 01/26/17 2100    lisinopril (PRINIVIL, ZESTRIL) tablet 10 mg  10 mg Oral DAILY Shilpa Perez MD        famotidine (PEPCID) tablet 20 mg  20 mg Oral DAILY Shilpa Perez MD        montelukast (SINGULAIR) tablet 10 mg  10 mg Oral QHS Shilpa Perez MD   10 mg at 01/26/17 2218    senna-docusate (PERICOLACE) 8.6-50 mg per tablet 2 Tab  2 Tab Oral QHS Shilpa Perez MD   2 Tab at 01/26/17 2218    solifenacin (VESICARE) tablet 5 mg  5 mg Oral DAILY Shilpa Perez MD        polyethylene glycol (MIRALAX) packet 17 g  17 g Oral DAILY Shilpa Perez MD        FLUoxetine (PROzac) capsule 20 mg  20 mg Oral DAILY Shilpa Perez MD        insulin lispro (HUMALOG) injection   SubCUTAneous AC&HS Shilpa Perez MD   2 Units at 01/26/17 2218    glucose chewable tablet 16 g  4 Tab Oral PRN Shilpa Perez MD        glucagon (GLUCAGEN) injection 1 mg  1 mg IntraMUSCular PRN Shilpa Perez MD        dextrose (D50W) injection syrg 12.5-25 g  25-50 mL IntraVENous PRN Shilpa Perez MD        naloxone (NARCAN) injection 0.4 mg  0.4 mg IntraVENous PRN [employer] MD Darrius        enoxaparin (LOVENOX) injection 40 mg  40 mg SubCUTAneous Q24H Emely Esquivel MD   40 mg at 01/26/17 2050    doxycycline (VIBRAMYCIN) capsule 100 mg  100 mg Oral Q12H Solitario Rizo MD   100 mg at 01/26/17 2050    Saccharomyces boulardii (FLORASTOR) capsule 250 mg  250 mg Oral BID Emely Esquivel MD   Stopped at 01/26/17 2100    hydrALAZINE (APRESOLINE) 20 mg/mL injection 10 mg  10 mg IntraVENous Q6H PRN Emely Esquivel MD         Current Outpatient Prescriptions   Medication Sig Dispense Refill    oxyCODONE-acetaminophen (PERCOCET 10)  mg per tablet Take 1 Tab by mouth every eight (8) hours as needed. Max Daily Amount: 3 Tabs. 30 Tab 0    bumetanide (BUMEX) 0.5 mg tablet Take 1 Tab by mouth every fourty-eight (48) hours. 15 Tab 0    nicotine (NICODERM CQ) 14 mg/24 hr patch 1 Patch by TransDERmal route daily for 30 days. 30 Patch 0    albuterol sulfate (PROVENTIL;VENTOLIN) 2.5 mg/0.5 mL nebu nebulizer solution One nebulizer treatment every 6 hours while awake x 5 days  then every 6 hours as needed for shortness of breath and/or wheezing. 50 mL 0    albuterol (VENTOLIN HFA) 90 mcg/actuation inhaler Take 2 Puffs by inhalation every six (6) hours as needed for Wheezing or Shortness of Breath. 1 Inhaler 0    fluticasone-salmeterol (ADVAIR DISKUS) 250-50 mcg/dose diskus inhaler Take 1 Puff by inhalation every twelve (12) hours. 1 Inhaler 0    lisinopril (PRINIVIL, ZESTRIL) 20 mg tablet Take 1 Tab by mouth daily. 15 Tab 2    amLODIPine (NORVASC) 5 mg tablet Take 1 Tab by mouth daily. 15 Tab 0    tiotropium (SPIRIVA) 18 mcg inhalation capsule Take 1 Cap by inhalation daily. 30 Cap 0    famotidine (PEPCID) 20 mg tablet Take 1 Tab by mouth daily. 30 Tab 0    senna-docusate (PERICOLACE) 8.6-50 mg per tablet Take 2 Tabs by mouth nightly. HOLD for loose stool. 60 Tab 1    FLUoxetine (PROZAC) 20 mg capsule Take 3 Caps by mouth daily.  90 Cap 1    montelukast (SINGULAIR) 10 mg tablet Take 1 Tab by mouth nightly. 30 Tab 2    solifenacin (VESICARE) 5 mg tablet Take 1 Tab by mouth daily. 30 Tab 1    polyethylene glycol (MIRALAX) 17 gram packet Take 1 Packet by mouth daily. 30 Packet 3        No Known Allergies    Review of Systems:  Constitutional: negative for fevers  Eyes: negative for cataracts  Ears, nose, mouth, throat, and face: negative for hearing loss  Respiratory: positive for cough  Cardiovascular: negative for chest pain  Gastrointestinal: negative for dysphagia and odynophagia  Genitourinary:negative for decreased stream  Integument/breast: negative for skin color change and changed mole  Hematologic/lymphatic: negative for coughing up blood  Musculoskeletal:negative for muscle weakness  Neurological: negative for dizziness  Behavioral/Psych: negative for anxiety  Endocrine: negative for temperature intolerance  Allergic/Immunologic: negative for urticaria    Objective:     Blood pressure 157/71, pulse 84, temperature 98 °F (36.7 °C), resp. rate 21, height 4' 11\" (1.499 m), weight 124.2 kg (273 lb 14.4 oz), SpO2 100 %. No data recorded. Intake and Output:  Current Shift:    Last 3 Shifts:      Physical Exam:   Visit Vitals    /71    Pulse 84    Temp 98 °F (36.7 °C)    Resp 21    Ht 4' 11\" (1.499 m)    Wt 124.2 kg (273 lb 14.4 oz)    SpO2 100%    BMI 55.32 kg/m2     General:  Alert, cooperative, no distress, appears stated age. Morbidly obese   Head:  Normocephalic, without obvious abnormality, atraumatic. Eyes:  Conjunctivae/corneas clear. PERRL, EOMs intact. Fundi benign. Ears:  Normal TMs and external ear canals both ears. Nose: Nares normal. Septum midline. Mucosa normal. No drainage or sinus tenderness. Throat: Lips, mucosa, and tongue normal. Teeth and gums normal.   Neck: Supple, symmetrical, trachea midline, no adenopathy, thyroid: no enlargement/tenderness/nodules, no carotid bruit and no JVD. Back:   Symmetric, no curvature.  ROM normal. No CVA tenderness. Lungs:   Clear to auscultation bilaterally. Chest wall:  No tenderness or deformity. Heart:  Regular rate and rhythm, S1, S2 normal, no murmur, click, rub or gallop. Breast Exam:  No tenderness, masses, or nipple abnormality. Abdomen:   Soft, non-tender. Bowel sounds normal. No masses,  No organomegaly. Genitalia:  Normal female without lesion, discharge or tenderness. Rectal:  Normal tone,  no masses or tenderness  Guaiac negative stool. Extremities: Extremities normal, atraumatic, no cyanosis or edema. Pulses: 2+ and symmetric all extremities. Skin: Skin color, texture, turgor normal. No rashes or lesions. Lymph nodes: Cervical, supraclavicular, and axillary nodes normal.   Neurologic: CNII-XII intact. Normal strength, sensation and reflexes throughout.        Additional comments:None    Lab/Data Review:  BMP:   Lab Results   Component Value Date/Time     01/27/2017 04:29 AM    K 3.6 01/27/2017 04:29 AM     01/27/2017 04:29 AM    CO2 23 01/27/2017 04:29 AM    AGAP 12 01/27/2017 04:29 AM     (H) 01/27/2017 04:29 AM    BUN 14 01/27/2017 04:29 AM    CREA 0.90 01/27/2017 04:29 AM    GFRAA >60 01/27/2017 04:29 AM    GFRNA >60 01/27/2017 04:29 AM     CBC:   Lab Results   Component Value Date/Time    WBC 10.0 01/27/2017 04:29 AM    HGB 12.5 01/27/2017 04:29 AM    HCT 37.0 01/27/2017 04:29 AM     01/27/2017 04:29 AM       Chest X-Ray: Normal.        Assessment:     Active Problems:    Shortness of breath (3/5/2016)      Asthma exacerbation (1/26/2017)        Plan:     Suspect that patient has VCDS rather than copd/asthma  Not wheezing at present   Her home bipap needs to be checked , she says it is very old  Continue neb rx  Will d/c steroids  3 days of abx and stop

## 2017-01-27 NOTE — ED NOTES
Bedside shift change report given to Porfirio Elena (oncoming nurse) by Ras Grover RN (offgoing nurse). Report included the following information SBAR, ED Summary and MAR.

## 2017-01-27 NOTE — PROGRESS NOTES
Pt examined. Chart reviewd. Pt is 45year old woman who presented to ER for an asthma attack. She has had a goiter know for about seven years. Both lobes enlarged. Right lobe has a 3 x 3 x 3 cm nodule which is no larger than on a prior ultrasound fifteen months previous. A fine needle aspiration biopsy was performed in 2015 on this nodule with benign results. Assessment: Nodular goiter with prominent right lobe nodule, benign by histology and stable in size over time. Thyroid function is normal.     Rec: calcitonin and thyroglobulin levels should be drawn. I do not think she needs a repeat biopsy at this time. I would observe the nodule over time, given the presence of chronic pulmonary compromise due to asthma.

## 2017-01-27 NOTE — ED NOTES
Received bedside report from [facility]. Pt alert and oriented, awaiting a bed, call bell in reach, c/o 9/10 chest pain right now, pt has hx of COPD and asthma and was on BIPAP for 8 hours earlier today.

## 2017-01-27 NOTE — ED NOTES
Hourly rounding-pt sleeping on bed, vitals stable, call bell in reach, no complaints at this time, pt still awaiting stepdown bed.

## 2017-01-27 NOTE — PROGRESS NOTES
[facility]  [facility]    Patient: Ewelina Arguelles Age: 45 y.o. : 1965 MR#: [id number] SSN: [id number]  Date/Time: 2017 3:16 PM    Subjective:     Off of bipap, breathing stable    Objective:   VS:   Visit Vitals    /82    Pulse 99    Temp 98 °F (36.7 °C)    Resp 22    Ht 4' 11\" (1.499 m)    Wt 124.2 kg (273 lb 14.4 oz)    SpO2 97%    Breastfeeding No    BMI 55.32 kg/m2      Tmax/24hrs: No data recorded. Input/Output: No intake or output data in the 24 hours ending 17 1516    General:  nad  Cardiovascular:  s1s2  Pulmonary:  Decreased bs  GI:  benign  Extremities:  No edema  Additional:      Labs:    Recent Results (from the past 24 hour(s))   GLUCOSE, POC    Collection Time: 17  9:28 PM   Result Value Ref Range    Glucose (POC) 151 (H) 70 - 110 mg/dL   CBC WITH AUTOMATED DIFF    Collection Time: 17  4:29 AM   Result Value Ref Range    WBC 10.0 4.6 - 13.2 K/uL    RBC 4.25 4.20 - 5.30 M/uL    HGB 12.5 12.0 - 16.0 g/dL    HCT 37.0 35.0 - 45.0 %    MCV 87.1 74.0 - 97.0 FL    MCH 29.4 24.0 - 34.0 PG    MCHC 33.8 31.0 - 37.0 g/dL    RDW 13.6 11.6 - 14.5 %    PLATELET 829 170 - 368 K/uL    MPV 11.1 9.2 - 11.8 FL    NEUTROPHILS 91 (H) 40 - 73 %    LYMPHOCYTES 8 (L) 21 - 52 %    MONOCYTES 1 (L) 3 - 10 %    EOSINOPHILS 0 0 - 5 %    BASOPHILS 0 0 - 2 %    ABS. NEUTROPHILS 9.0 (H) 1.8 - 8.0 K/UL    ABS. LYMPHOCYTES 0.8 (L) 0.9 - 3.6 K/UL    ABS. MONOCYTES 0.1 0.05 - 1.2 K/UL    ABS. EOSINOPHILS 0.0 0.0 - 0.4 K/UL    ABS.  BASOPHILS 0.0 0.0 - 0.1 K/UL    DF AUTOMATED     METABOLIC PANEL, COMPREHENSIVE    Collection Time: 17  4:29 AM   Result Value Ref Range    Sodium 138 136 - 145 mmol/L    Potassium 3.6 3.5 - 5.5 mmol/L    Chloride 103 100 - 108 mmol/L    CO2 23 21 - 32 mmol/L    Anion gap 12 3.0 - 18 mmol/L    Glucose 167 (H) 74 - 99 mg/dL    BUN 14 7.0 - 18 MG/DL    Creatinine 0.90 0.6 - 1.3 MG/DL    BUN/Creatinine ratio 16 12 - 20      GFR est AA >60 >60 ml/min/1.73m2    GFR est non-AA >60 >60 ml/min/1.73m2    Calcium 8.7 8.5 - 10.1 MG/DL    Bilirubin, total 0.3 0.2 - 1.0 MG/DL    ALT 20 13 - 56 U/L    AST 5 (L) 15 - 37 U/L    Alk.  phosphatase 102 45 - 117 U/L    Protein, total 6.4 6.4 - 8.2 g/dL    Albumin 3.1 (L) 3.4 - 5.0 g/dL    Globulin 3.3 2.0 - 4.0 g/dL    A-G Ratio 0.9 0.8 - 1.7     PROTHROMBIN TIME + INR    Collection Time: 01/27/17  4:29 AM   Result Value Ref Range    Prothrombin time 12.8 11.5 - 15.2 sec    INR 1.0 0.8 - 1.2     MAGNESIUM    Collection Time: 01/27/17  4:29 AM   Result Value Ref Range    Magnesium 1.9 1.8 - 2.4 mg/dL   HEMOGLOBIN A1C WITH EAG    Collection Time: 01/27/17  4:29 AM   Result Value Ref Range    Hemoglobin A1c 4.9 4.2 - 5.6 %    Est. average glucose CANNOT BE CALCULATED mg/dL   GLUCOSE, POC    Collection Time: 01/27/17  9:09 AM   Result Value Ref Range    Glucose (POC) 158 (H) 70 - 110 mg/dL     Additional Data Reviewed:      Current Facility-Administered Medications   Medication Dose Route Frequency    ALPRAZolam (XANAX) tablet 0.5 mg  0.5 mg Oral BID PRN    albuterol (PROVENTIL VENTOLIN) nebulizer solution 2.5 mg  2.5 mg Nebulization Q2H PRN    albuterol-ipratropium (DUO-NEB) 2.5 MG-0.5 MG/3 ML  3 mL Nebulization Q4H RT    bumetanide (BUMEX) tablet 0.5 mg  0.5 mg Oral Q48H    oxyCODONE-acetaminophen (PERCOCET 10)  mg per tablet 1 Tab  1 Tab Oral Q6H PRN    amLODIPine (NORVASC) tablet 5 mg  5 mg Oral DAILY    budesonide-formoterol (SYMBICORT) 160-4.5 mcg/actuation HFA inhaler 2 Puff  2 Puff Inhalation BID RT    lisinopril (PRINIVIL, ZESTRIL) tablet 10 mg  10 mg Oral DAILY    famotidine (PEPCID) tablet 20 mg  20 mg Oral DAILY    montelukast (SINGULAIR) tablet 10 mg  10 mg Oral QHS    senna-docusate (PERICOLACE) 8.6-50 mg per tablet 2 Tab  2 Tab Oral QHS    solifenacin (VESICARE) tablet 5 mg  5 mg Oral DAILY    polyethylene glycol (MIRALAX) packet 17 g  17 g Oral DAILY    FLUoxetine (PROzac) capsule 20 mg  20 mg Oral DAILY    insulin lispro (HUMALOG) injection   SubCUTAneous AC&HS    glucose chewable tablet 16 g  4 Tab Oral PRN    glucagon (GLUCAGEN) injection 1 mg  1 mg IntraMUSCular PRN    dextrose (D50W) injection syrg 12.5-25 g  25-50 mL IntraVENous PRN    naloxone (NARCAN) injection 0.4 mg  0.4 mg IntraVENous PRN    enoxaparin (LOVENOX) injection 40 mg  40 mg SubCUTAneous Q24H    doxycycline (VIBRAMYCIN) capsule 100 mg  100 mg Oral Q12H    Saccharomyces boulardii (FLORASTOR) capsule 250 mg  250 mg Oral BID    hydrALAZINE (APRESOLINE) 20 mg/mL injection 10 mg  10 mg IntraVENous Q6H PRN       Assessment/Plan:     Patient Active Problem List   Diagnosis Code    Asthma exacerbation attacks J45. 0    Status asthmaticus with COPD (chronic obstructive pulmonary disease) ([facility]) J44.9, J45.902    Abnormal CT of the chest R93.8    Shortness of breath R06.02    Asthma J45.909    COPD exacerbation ([facility]) J44.1    Acute on chronic respiratory failure with hypoxemia ([facility]) J96.21    COPD with exacerbation ([facility]) J44.1    Acute exacerbation of chronic obstructive pulmonary disease (COPD) ([facility]) J44.1    Respiratory failure ([facility]) J96.90    Acute encephalopathy G93.40    Asthma exacerbation J45.901       Plan:    02, bd's, bipap at night  bp control  abx's  dvt prophylaxis       Case discussed with:  []Patient  []Family  []Nursing  []Case Management  DVT Prophylaxis:  []Lovenox  []Hep SQ  []SCDs  []Coumadin   []On Heparin gtt    Signed By: Mei Moore MD

## 2017-01-28 LAB
ANION GAP BLD CALC-SCNC: 11 MMOL/L (ref 3–18)
BASOPHILS # BLD AUTO: 0 K/UL (ref 0–0.1)
BASOPHILS # BLD: 0 % (ref 0–2)
BUN SERPL-MCNC: 23 MG/DL (ref 7–18)
BUN/CREAT SERPL: 23 (ref 12–20)
CALCIUM SERPL-MCNC: 8.5 MG/DL (ref 8.5–10.1)
CHLORIDE SERPL-SCNC: 105 MMOL/L (ref 100–108)
CO2 SERPL-SCNC: 27 MMOL/L (ref 21–32)
CREAT SERPL-MCNC: 1 MG/DL (ref 0.6–1.3)
DIFFERENTIAL METHOD BLD: ABNORMAL
EOSINOPHIL # BLD: 0 K/UL (ref 0–0.4)
EOSINOPHIL NFR BLD: 0 % (ref 0–5)
ERYTHROCYTE [DISTWIDTH] IN BLOOD BY AUTOMATED COUNT: 14 % (ref 11.6–14.5)
GLUCOSE BLD STRIP.AUTO-MCNC: 120 MG/DL (ref 70–110)
GLUCOSE BLD STRIP.AUTO-MCNC: 98 MG/DL (ref 70–110)
GLUCOSE SERPL-MCNC: 100 MG/DL (ref 74–99)
HCT VFR BLD AUTO: 37.3 % (ref 35–45)
HGB BLD-MCNC: 11.9 G/DL (ref 12–16)
LYMPHOCYTES # BLD AUTO: 19 % (ref 21–52)
LYMPHOCYTES # BLD: 2 K/UL (ref 0.9–3.6)
MCH RBC QN AUTO: 28.6 PG (ref 24–34)
MCHC RBC AUTO-ENTMCNC: 31.9 G/DL (ref 31–37)
MCV RBC AUTO: 89.7 FL (ref 74–97)
MONOCYTES # BLD: 0.4 K/UL (ref 0.05–1.2)
MONOCYTES NFR BLD AUTO: 4 % (ref 3–10)
NEUTS SEG # BLD: 7.8 K/UL (ref 1.8–8)
NEUTS SEG NFR BLD AUTO: 77 % (ref 40–73)
PLATELET # BLD AUTO: 211 K/UL (ref 135–420)
PMV BLD AUTO: 11.1 FL (ref 9.2–11.8)
POTASSIUM SERPL-SCNC: 3.3 MMOL/L (ref 3.5–5.5)
RBC # BLD AUTO: 4.16 M/UL (ref 4.2–5.3)
SODIUM SERPL-SCNC: 143 MMOL/L (ref 136–145)
WBC # BLD AUTO: 10.1 K/UL (ref 4.6–13.2)

## 2017-01-28 PROCEDURE — 85025 COMPLETE CBC W/AUTO DIFF WBC: CPT | Performed by: INTERNAL MEDICINE

## 2017-01-28 PROCEDURE — 74011250636 HC RX REV CODE- 250/636: Performed by: EMERGENCY MEDICINE

## 2017-01-28 PROCEDURE — 84432 ASSAY OF THYROGLOBULIN: CPT | Performed by: INTERNAL MEDICINE

## 2017-01-28 PROCEDURE — 77010033678 HC OXYGEN DAILY

## 2017-01-28 PROCEDURE — 65660000000 HC RM CCU STEPDOWN

## 2017-01-28 PROCEDURE — 82308 ASSAY OF CALCITONIN: CPT | Performed by: INTERNAL MEDICINE

## 2017-01-28 PROCEDURE — 74011250637 HC RX REV CODE- 250/637: Performed by: EMERGENCY MEDICINE

## 2017-01-28 PROCEDURE — 94660 CPAP INITIATION&MGMT: CPT

## 2017-01-28 PROCEDURE — 74011250637 HC RX REV CODE- 250/637: Performed by: INTERNAL MEDICINE

## 2017-01-28 PROCEDURE — 82962 GLUCOSE BLOOD TEST: CPT

## 2017-01-28 PROCEDURE — 94640 AIRWAY INHALATION TREATMENT: CPT

## 2017-01-28 PROCEDURE — 74011000250 HC RX REV CODE- 250: Performed by: EMERGENCY MEDICINE

## 2017-01-28 PROCEDURE — 80048 BASIC METABOLIC PNL TOTAL CA: CPT | Performed by: INTERNAL MEDICINE

## 2017-01-28 PROCEDURE — 86800 THYROGLOBULIN ANTIBODY: CPT | Performed by: INTERNAL MEDICINE

## 2017-01-28 PROCEDURE — 36415 COLL VENOUS BLD VENIPUNCTURE: CPT | Performed by: INTERNAL MEDICINE

## 2017-01-28 RX ORDER — OXYCODONE AND ACETAMINOPHEN 10; 325 MG/1; MG/1
1 TABLET ORAL
Status: DISCONTINUED | OUTPATIENT
Start: 2017-01-28 | End: 2017-01-30 | Stop reason: HOSPADM

## 2017-01-28 RX ORDER — POTASSIUM CHLORIDE 20 MEQ/1
40 TABLET, EXTENDED RELEASE ORAL 2 TIMES DAILY
Status: COMPLETED | OUTPATIENT
Start: 2017-01-28 | End: 2017-01-28

## 2017-01-28 RX ORDER — IPRATROPIUM BROMIDE AND ALBUTEROL SULFATE 2.5; .5 MG/3ML; MG/3ML
3 SOLUTION RESPIRATORY (INHALATION)
Status: DISCONTINUED | OUTPATIENT
Start: 2017-01-28 | End: 2017-01-30 | Stop reason: HOSPADM

## 2017-01-28 RX ADMIN — Medication 250 MG: at 17:34

## 2017-01-28 RX ADMIN — OXYCODONE HYDROCHLORIDE AND ACETAMINOPHEN 1 TABLET: 10; 325 TABLET ORAL at 17:32

## 2017-01-28 RX ADMIN — AMLODIPINE BESYLATE 10 MG: 10 TABLET ORAL at 09:01

## 2017-01-28 RX ADMIN — ENOXAPARIN SODIUM 40 MG: 40 INJECTION SUBCUTANEOUS at 17:34

## 2017-01-28 RX ADMIN — BUDESONIDE AND FORMOTEROL FUMARATE DIHYDRATE 2 PUFF: 160; 4.5 AEROSOL RESPIRATORY (INHALATION) at 20:00

## 2017-01-28 RX ADMIN — DOXYCYCLINE HYCLATE 100 MG: 100 CAPSULE ORAL at 21:03

## 2017-01-28 RX ADMIN — STANDARDIZED SENNA CONCENTRATE AND DOCUSATE SODIUM 2 TABLET: 8.6; 5 TABLET, FILM COATED ORAL at 21:03

## 2017-01-28 RX ADMIN — SOLIFENACIN SUCCINATE 5 MG: 5 TABLET, FILM COATED ORAL at 09:01

## 2017-01-28 RX ADMIN — IPRATROPIUM BROMIDE AND ALBUTEROL SULFATE 3 ML: .5; 3 SOLUTION RESPIRATORY (INHALATION) at 07:36

## 2017-01-28 RX ADMIN — POTASSIUM CHLORIDE 40 MEQ: 1500 TABLET, EXTENDED RELEASE ORAL at 09:00

## 2017-01-28 RX ADMIN — ALPRAZOLAM 0.5 MG: 0.5 TABLET ORAL at 09:08

## 2017-01-28 RX ADMIN — LISINOPRIL 10 MG: 10 TABLET ORAL at 09:01

## 2017-01-28 RX ADMIN — FAMOTIDINE 20 MG: 20 TABLET ORAL at 09:01

## 2017-01-28 RX ADMIN — IPRATROPIUM BROMIDE AND ALBUTEROL SULFATE 3 ML: .5; 3 SOLUTION RESPIRATORY (INHALATION) at 11:25

## 2017-01-28 RX ADMIN — BUDESONIDE AND FORMOTEROL FUMARATE DIHYDRATE 2 PUFF: 160; 4.5 AEROSOL RESPIRATORY (INHALATION) at 07:36

## 2017-01-28 RX ADMIN — OXYCODONE HYDROCHLORIDE AND ACETAMINOPHEN 1 TABLET: 10; 325 TABLET ORAL at 03:53

## 2017-01-28 RX ADMIN — FLUOXETINE 20 MG: 20 CAPSULE ORAL at 09:00

## 2017-01-28 RX ADMIN — POLYETHYLENE GLYCOL 3350 17 G: 17 POWDER, FOR SOLUTION ORAL at 08:59

## 2017-01-28 RX ADMIN — IPRATROPIUM BROMIDE AND ALBUTEROL SULFATE 3 ML: .5; 3 SOLUTION RESPIRATORY (INHALATION) at 03:51

## 2017-01-28 RX ADMIN — OXYCODONE HYDROCHLORIDE AND ACETAMINOPHEN 1 TABLET: 10; 325 TABLET ORAL at 08:59

## 2017-01-28 RX ADMIN — OXYCODONE HYDROCHLORIDE AND ACETAMINOPHEN 1 TABLET: 10; 325 TABLET ORAL at 13:15

## 2017-01-28 RX ADMIN — DOXYCYCLINE HYCLATE 100 MG: 100 CAPSULE ORAL at 09:00

## 2017-01-28 RX ADMIN — MONTELUKAST SODIUM 10 MG: 10 TABLET, FILM COATED ORAL at 21:03

## 2017-01-28 RX ADMIN — Medication 250 MG: at 09:01

## 2017-01-28 RX ADMIN — OXYCODONE HYDROCHLORIDE AND ACETAMINOPHEN 1 TABLET: 10; 325 TABLET ORAL at 21:05

## 2017-01-28 RX ADMIN — POTASSIUM CHLORIDE 40 MEQ: 1500 TABLET, EXTENDED RELEASE ORAL at 17:34

## 2017-01-28 NOTE — PROGRESS NOTES
[facility]  [facility]    Patient: Tina Mckeon Age: 45 y.o. : 1965 MR#: [id number] SSN: [id number]  Date/Time: 2017 3:16 PM    Subjective:     No new events, breathing stable    Objective:   VS:   Visit Vitals    /86    Pulse 72    Temp 97.1 °F (36.2 °C)    Resp 20    Ht 4' 11\" (1.499 m)    Wt 118 kg (260 lb 2.3 oz)    SpO2 98%    Breastfeeding No    BMI 52.54 kg/m2      Tmax/24hrs: Temp (24hrs), Av.6 °F (36.4 °C), Min:97 °F (36.1 °C), Max:98.2 °F (36.8 °C)     Input/Output:     Intake/Output Summary (Last 24 hours) at 17 0745  Last data filed at 17 0000   Gross per 24 hour   Intake              720 ml   Output                0 ml   Net              720 ml       General:  nad  Cardiovascular:  s1s2  Pulmonary:  Decreased bs  GI:  benign  Extremities:  No edema  Additional:      Labs:    Recent Results (from the past 24 hour(s))   GLUCOSE, POC    Collection Time: 17  9:09 AM   Result Value Ref Range    Glucose (POC) 158 (H) 70 - 110 mg/dL   GLUCOSE, POC    Collection Time: 17  5:20 PM   Result Value Ref Range    Glucose (POC) 130 (H) 70 - 110 mg/dL   GLUCOSE, POC    Collection Time: 17  8:41 PM   Result Value Ref Range    Glucose (POC) 115 (H) 70 - 110 mg/dL   CBC WITH AUTOMATED DIFF    Collection Time: 17  3:01 AM   Result Value Ref Range    WBC 10.1 4.6 - 13.2 K/uL    RBC 4.16 (L) 4.20 - 5.30 M/uL    HGB 11.9 (L) 12.0 - 16.0 g/dL    HCT 37.3 35.0 - 45.0 %    MCV 89.7 74.0 - 97.0 FL    MCH 28.6 24.0 - 34.0 PG    MCHC 31.9 31.0 - 37.0 g/dL    RDW 14.0 11.6 - 14.5 %    PLATELET 715 375 - 260 K/uL    MPV 11.1 9.2 - 11.8 FL    NEUTROPHILS 77 (H) 40 - 73 %    LYMPHOCYTES 19 (L) 21 - 52 %    MONOCYTES 4 3 - 10 %    EOSINOPHILS 0 0 - 5 %    BASOPHILS 0 0 - 2 %    ABS. NEUTROPHILS 7.8 1.8 - 8.0 K/UL    ABS. LYMPHOCYTES 2.0 0.9 - 3.6 K/UL    ABS. MONOCYTES 0.4 0.05 - 1.2 K/UL    ABS.  EOSINOPHILS 0.0 0.0 - 0.4 K/UL    ABS.  BASOPHILS 0.0 0.0 - 0.1 K/UL    DF AUTOMATED     METABOLIC PANEL, BASIC    Collection Time: 01/28/17  3:01 AM   Result Value Ref Range    Sodium 143 136 - 145 mmol/L    Potassium 3.3 (L) 3.5 - 5.5 mmol/L    Chloride 105 100 - 108 mmol/L    CO2 27 21 - 32 mmol/L    Anion gap 11 3.0 - 18 mmol/L    Glucose 100 (H) 74 - 99 mg/dL    BUN 23 (H) 7.0 - 18 MG/DL    Creatinine 1.00 0.6 - 1.3 MG/DL    BUN/Creatinine ratio 23 (H) 12 - 20      GFR est AA >60 >60 ml/min/1.73m2    GFR est non-AA 58 (L) >60 ml/min/1.73m2    Calcium 8.5 8.5 - 10.1 MG/DL     Additional Data Reviewed:      Current Facility-Administered Medications   Medication Dose Route Frequency    ALPRAZolam (XANAX) tablet 0.5 mg  0.5 mg Oral BID PRN    amLODIPine (NORVASC) tablet 10 mg  10 mg Oral DAILY    albuterol (PROVENTIL VENTOLIN) nebulizer solution 2.5 mg  2.5 mg Nebulization Q2H PRN    albuterol-ipratropium (DUO-NEB) 2.5 MG-0.5 MG/3 ML  3 mL Nebulization Q4H RT    bumetanide (BUMEX) tablet 0.5 mg  0.5 mg Oral Q48H    oxyCODONE-acetaminophen (PERCOCET 10)  mg per tablet 1 Tab  1 Tab Oral Q6H PRN    budesonide-formoterol (SYMBICORT) 160-4.5 mcg/actuation HFA inhaler 2 Puff  2 Puff Inhalation BID RT    lisinopril (PRINIVIL, ZESTRIL) tablet 10 mg  10 mg Oral DAILY    famotidine (PEPCID) tablet 20 mg  20 mg Oral DAILY    montelukast (SINGULAIR) tablet 10 mg  10 mg Oral QHS    senna-docusate (PERICOLACE) 8.6-50 mg per tablet 2 Tab  2 Tab Oral QHS    solifenacin (VESICARE) tablet 5 mg  5 mg Oral DAILY    polyethylene glycol (MIRALAX) packet 17 g  17 g Oral DAILY    FLUoxetine (PROzac) capsule 20 mg  20 mg Oral DAILY    insulin lispro (HUMALOG) injection   SubCUTAneous AC&HS    glucose chewable tablet 16 g  4 Tab Oral PRN    glucagon (GLUCAGEN) injection 1 mg  1 mg IntraMUSCular PRN    dextrose (D50W) injection syrg 12.5-25 g  25-50 mL IntraVENous PRN    naloxone (NARCAN) injection 0.4 mg  0.4 mg IntraVENous PRN    enoxaparin (LOVENOX) injection 40 mg  40 mg SubCUTAneous Q24H    doxycycline (VIBRAMYCIN) capsule 100 mg  100 mg Oral Q12H    Saccharomyces boulardii (FLORASTOR) capsule 250 mg  250 mg Oral BID    hydrALAZINE (APRESOLINE) 20 mg/mL injection 10 mg  10 mg IntraVENous Q6H PRN       Assessment/Plan:     Patient Active Problem List   Diagnosis Code    Asthma exacerbation attacks J45. 0    Status asthmaticus with COPD (chronic obstructive pulmonary disease) ([facility]) J44.9, J45.902    Abnormal CT of the chest R93.8    Shortness of breath R06.02    Asthma J45.909    COPD exacerbation ([facility]) J44.1    Acute on chronic respiratory failure with hypoxemia ([facility]) J96.21    COPD with exacerbation ([facility]) J44.1    Acute exacerbation of chronic obstructive pulmonary disease (COPD) ([facility]) J44.1    Respiratory failure ([facility]) J96.90    Acute encephalopathy G93.40    Asthma exacerbation J45.901       Plan:    02, bd's, bipap at night  bp control  abx's  dvt prophylaxis       Case discussed with:  []Patient  []Family  []Nursing  []Case Management  DVT Prophylaxis:  []Lovenox  []Hep SQ  []SCDs  []Coumadin   []On Heparin gtt    Signed By: Leslie Melton MD

## 2017-01-28 NOTE — PROGRESS NOTES
conducted an initial consultation and Spiritual Assessment for Pio Lebron, who is a 45 y.o.,female. Patients Primary Language is: [place]. According to the patients EMR Zoroastrianism Affiliation is: Abner Epley.     The reason the Patient came to the hospital is:   Patient Active Problem List    Diagnosis Date Noted    Asthma exacerbation 01/26/2017    Respiratory failure ([address] 01/11/2017    Acute encephalopathy 01/11/2017    Acute exacerbation of chronic obstructive pulmonary disease (COPD) ([address] 08/29/2016    COPD with exacerbation ([address] 07/11/2016    COPD exacerbation ([address] 03/12/2016    Acute on chronic respiratory failure with hypoxemia ([facility] 03/12/2016    Shortness of breath 03/05/2016    Asthma 03/05/2016    Abnormal CT of the chest 11/20/2015    Asthma exacerbation attacks 10/19/2015    Status asthmaticus with COPD (chronic obstructive pulmonary disease) ([facility] 10/19/2015        The  provided the following Interventions:  Initiated a relationship of care and support. Listened empathically. Provided information about Spiritual Care Services. Offered prayer and assurance of continued prayers on patient's behalf. Chart reviewed. The following outcomes where achieved:   confirmed Patient's Zoroastrianism Affiliation. Patient expressed gratitude for 's visit. Assessment:  Patient does not have any Rastafarian/cultural needs that will affect patients preferences in health care. There are no spiritual or Rastafarian issues which require intervention at this time. Plan:  Chaplains will continue to follow and will provide pastoral care on an as needed/requested basis.  recommends bedside caregivers page  on duty if patient shows signs of acute spiritual or emotional distress.     [address]  [phone])

## 2017-01-28 NOTE — ROUTINE PROCESS
0750 Pt received after bedside shift report from Felice Porter RN. Pt resting in bed with no distress noted. Bed locked and in low position. Instruct pt to call for assistance. Call bell in reach. End of shift bedside report given to Latosha Luong RN. Report included the following information. SBAR, MAR, KARDEX AND RECENT RESULTS.

## 2017-01-28 NOTE — ROUTINE PROCESS
Bedside and Verbal shift change report given to [facility] (oncoming nurse) by Sapna Rodriguez RN (offgoing nurse). Report included the following information SBAR, Kardex and MAR.

## 2017-01-28 NOTE — PROGRESS NOTES
Pulmonology Progress Note    Subjective:     Kunal Guevara is awake, alert and on no pressors, breathing comfortably and without complaints of pain. Using bipap at night    Current Facility-Administered Medications   Medication Dose Route Frequency    potassium chloride (K-DUR, KLOR-CON) SR tablet 40 mEq  40 mEq Oral BID    oxyCODONE-acetaminophen (PERCOCET 10)  mg per tablet 1 Tab  1 Tab Oral Q4H PRN    ALPRAZolam (XANAX) tablet 0.5 mg  0.5 mg Oral BID PRN    amLODIPine (NORVASC) tablet 10 mg  10 mg Oral DAILY    albuterol (PROVENTIL VENTOLIN) nebulizer solution 2.5 mg  2.5 mg Nebulization Q2H PRN    albuterol-ipratropium (DUO-NEB) 2.5 MG-0.5 MG/3 ML  3 mL Nebulization Q4H RT    bumetanide (BUMEX) tablet 0.5 mg  0.5 mg Oral Q48H    budesonide-formoterol (SYMBICORT) 160-4.5 mcg/actuation HFA inhaler 2 Puff  2 Puff Inhalation BID RT    lisinopril (PRINIVIL, ZESTRIL) tablet 10 mg  10 mg Oral DAILY    famotidine (PEPCID) tablet 20 mg  20 mg Oral DAILY    montelukast (SINGULAIR) tablet 10 mg  10 mg Oral QHS    senna-docusate (PERICOLACE) 8.6-50 mg per tablet 2 Tab  2 Tab Oral QHS    solifenacin (VESICARE) tablet 5 mg  5 mg Oral DAILY    polyethylene glycol (MIRALAX) packet 17 g  17 g Oral DAILY    FLUoxetine (PROzac) capsule 20 mg  20 mg Oral DAILY    insulin lispro (HUMALOG) injection   SubCUTAneous AC&HS    glucose chewable tablet 16 g  4 Tab Oral PRN    glucagon (GLUCAGEN) injection 1 mg  1 mg IntraMUSCular PRN    dextrose (D50W) injection syrg 12.5-25 g  25-50 mL IntraVENous PRN    naloxone (NARCAN) injection 0.4 mg  0.4 mg IntraVENous PRN    enoxaparin (LOVENOX) injection 40 mg  40 mg SubCUTAneous Q24H    doxycycline (VIBRAMYCIN) capsule 100 mg  100 mg Oral Q12H    Saccharomyces boulardii (FLORASTOR) capsule 250 mg  250 mg Oral BID    hydrALAZINE (APRESOLINE) 20 mg/mL injection 10 mg  10 mg IntraVENous Q6H PRN       Review of Systems:  Pertinent items are noted in HPI.     Objective: Visit Vitals    BP (!) 168/99 (BP 1 Location: Right arm, BP Patient Position: At rest)    Pulse 84    Temp 97.1 °F (36.2 °C)    Resp 18    Ht 4' 11\" (1.499 m)    Wt 118 kg (260 lb 2.3 oz)    SpO2 98%    Breastfeeding No    BMI 52.54 kg/m2    O2 Flow Rate (L/min): 2 l/min O2 Device: Nasal cannula    01/26 1901 - 01/28 0700  In: 720 [P.O.:720]  Out: -   01/28 0701 - 01/28 1900  In: 300 [P.O.:300]  Out: -     Physical Exam:   Visit Vitals    BP (!) 168/99 (BP 1 Location: Right arm, BP Patient Position: At rest)    Pulse 84    Temp 97.1 °F (36.2 °C)    Resp 18    Ht 4' 11\" (1.499 m)    Wt 118 kg (260 lb 2.3 oz)    SpO2 98%    Breastfeeding No    BMI 52.54 kg/m2     General:  Alert, cooperative, no distress, appears stated age. Head:  Normocephalic, without obvious abnormality, atraumatic. Eyes:  Conjunctivae/corneas clear. PERRL, EOMs intact. Fundi benign. Ears:  Normal TMs and external ear canals both ears. Nose: Nares normal. Septum midline. Mucosa normal. No drainage or sinus tenderness. Throat: Lips, mucosa, and tongue normal. Teeth and gums normal.   Neck: Supple, symmetrical, trachea midline, no adenopathy, thyroid: no enlargement/tenderness/nodules, no carotid bruit and no JVD. Back:   Symmetric, no curvature. ROM normal. No CVA tenderness. Lungs:   Clear to auscultation bilaterally. Chest wall:  No tenderness or deformity. Heart:  Regular rate and rhythm, S1, S2 normal, no murmur, click, rub or gallop. Breast Exam:  No tenderness, masses, or nipple abnormality. Abdomen:   Soft, non-tender. Bowel sounds normal. No masses,  No organomegaly. Genitalia:  Normal female without lesion, discharge or tenderness. Rectal:  Normal tone,  no masses or tenderness  Guaiac negative stool. Extremities: Extremities normal, atraumatic, no cyanosis or edema. Pulses: 2+ and symmetric all extremities. Skin: Skin color, texture, turgor normal. No rashes or lesions.    Lymph nodes: Cervical, supraclavicular, and axillary nodes normal.   Neurologic: CNII-XII intact. Normal strength, sensation and reflexes throughout. Data Review:    Recent Results (from the past 24 hour(s))   GLUCOSE, POC    Collection Time: 01/27/17  5:20 PM   Result Value Ref Range    Glucose (POC) 130 (H) 70 - 110 mg/dL   GLUCOSE, POC    Collection Time: 01/27/17  8:41 PM   Result Value Ref Range    Glucose (POC) 115 (H) 70 - 110 mg/dL   CBC WITH AUTOMATED DIFF    Collection Time: 01/28/17  3:01 AM   Result Value Ref Range    WBC 10.1 4.6 - 13.2 K/uL    RBC 4.16 (L) 4.20 - 5.30 M/uL    HGB 11.9 (L) 12.0 - 16.0 g/dL    HCT 37.3 35.0 - 45.0 %    MCV 89.7 74.0 - 97.0 FL    MCH 28.6 24.0 - 34.0 PG    MCHC 31.9 31.0 - 37.0 g/dL    RDW 14.0 11.6 - 14.5 %    PLATELET 120 923 - 903 K/uL    MPV 11.1 9.2 - 11.8 FL    NEUTROPHILS 77 (H) 40 - 73 %    LYMPHOCYTES 19 (L) 21 - 52 %    MONOCYTES 4 3 - 10 %    EOSINOPHILS 0 0 - 5 %    BASOPHILS 0 0 - 2 %    ABS. NEUTROPHILS 7.8 1.8 - 8.0 K/UL    ABS. LYMPHOCYTES 2.0 0.9 - 3.6 K/UL    ABS. MONOCYTES 0.4 0.05 - 1.2 K/UL    ABS. EOSINOPHILS 0.0 0.0 - 0.4 K/UL    ABS.  BASOPHILS 0.0 0.0 - 0.1 K/UL    DF AUTOMATED     METABOLIC PANEL, BASIC    Collection Time: 01/28/17  3:01 AM   Result Value Ref Range    Sodium 143 136 - 145 mmol/L    Potassium 3.3 (L) 3.5 - 5.5 mmol/L    Chloride 105 100 - 108 mmol/L    CO2 27 21 - 32 mmol/L    Anion gap 11 3.0 - 18 mmol/L    Glucose 100 (H) 74 - 99 mg/dL    BUN 23 (H) 7.0 - 18 MG/DL    Creatinine 1.00 0.6 - 1.3 MG/DL    BUN/Creatinine ratio 23 (H) 12 - 20      GFR est AA >60 >60 ml/min/1.73m2    GFR est non-AA 58 (L) >60 ml/min/1.73m2    Calcium 8.5 8.5 - 10.1 MG/DL           Assessment:     Active Problems:    Shortness of breath (3/5/2016)      Asthma exacerbation (1/26/2017)        Plan:     Her home bipap needs to be repaired or replaced through her DME  Start home meds  Change neb rx to prn  Ok to be d/c'd home from pul standpoint but her bipap needs to be checked out

## 2017-01-29 LAB
ANION GAP BLD CALC-SCNC: 7 MMOL/L (ref 3–18)
BASOPHILS # BLD AUTO: 0 K/UL (ref 0–0.1)
BASOPHILS # BLD: 0 % (ref 0–2)
BUN SERPL-MCNC: 17 MG/DL (ref 7–18)
BUN/CREAT SERPL: 18 (ref 12–20)
CALCIT SERPL-MCNC: 6.7 PG/ML (ref 0–5)
CALCIUM SERPL-MCNC: 8.7 MG/DL (ref 8.5–10.1)
CHLORIDE SERPL-SCNC: 108 MMOL/L (ref 100–108)
CO2 SERPL-SCNC: 29 MMOL/L (ref 21–32)
CREAT SERPL-MCNC: 0.94 MG/DL (ref 0.6–1.3)
DIFFERENTIAL METHOD BLD: NORMAL
EOSINOPHIL # BLD: 0.1 K/UL (ref 0–0.4)
EOSINOPHIL NFR BLD: 1 % (ref 0–5)
ERYTHROCYTE [DISTWIDTH] IN BLOOD BY AUTOMATED COUNT: 14 % (ref 11.6–14.5)
GLUCOSE BLD STRIP.AUTO-MCNC: 103 MG/DL (ref 70–110)
GLUCOSE BLD STRIP.AUTO-MCNC: 115 MG/DL (ref 70–110)
GLUCOSE BLD STRIP.AUTO-MCNC: 118 MG/DL (ref 70–110)
GLUCOSE BLD STRIP.AUTO-MCNC: 143 MG/DL (ref 70–110)
GLUCOSE SERPL-MCNC: 96 MG/DL (ref 74–99)
HCT VFR BLD AUTO: 38.4 % (ref 35–45)
HGB BLD-MCNC: 12.3 G/DL (ref 12–16)
LYMPHOCYTES # BLD AUTO: 33 % (ref 21–52)
LYMPHOCYTES # BLD: 2.2 K/UL (ref 0.9–3.6)
MCH RBC QN AUTO: 29.1 PG (ref 24–34)
MCHC RBC AUTO-ENTMCNC: 32 G/DL (ref 31–37)
MCV RBC AUTO: 90.8 FL (ref 74–97)
MONOCYTES # BLD: 0.3 K/UL (ref 0.05–1.2)
MONOCYTES NFR BLD AUTO: 4 % (ref 3–10)
NEUTS SEG # BLD: 4.2 K/UL (ref 1.8–8)
NEUTS SEG NFR BLD AUTO: 62 % (ref 40–73)
PLATELET # BLD AUTO: 198 K/UL (ref 135–420)
PMV BLD AUTO: 11.1 FL (ref 9.2–11.8)
POTASSIUM SERPL-SCNC: 3.8 MMOL/L (ref 3.5–5.5)
RBC # BLD AUTO: 4.23 M/UL (ref 4.2–5.3)
SODIUM SERPL-SCNC: 144 MMOL/L (ref 136–145)
WBC # BLD AUTO: 6.7 K/UL (ref 4.6–13.2)

## 2017-01-29 PROCEDURE — 65660000000 HC RM CCU STEPDOWN

## 2017-01-29 PROCEDURE — 82962 GLUCOSE BLOOD TEST: CPT

## 2017-01-29 PROCEDURE — 77010033678 HC OXYGEN DAILY

## 2017-01-29 PROCEDURE — 74011250636 HC RX REV CODE- 250/636: Performed by: EMERGENCY MEDICINE

## 2017-01-29 PROCEDURE — 74011250637 HC RX REV CODE- 250/637: Performed by: INTERNAL MEDICINE

## 2017-01-29 PROCEDURE — 74011250637 HC RX REV CODE- 250/637: Performed by: EMERGENCY MEDICINE

## 2017-01-29 PROCEDURE — 85025 COMPLETE CBC W/AUTO DIFF WBC: CPT | Performed by: INTERNAL MEDICINE

## 2017-01-29 PROCEDURE — 80048 BASIC METABOLIC PNL TOTAL CA: CPT | Performed by: INTERNAL MEDICINE

## 2017-01-29 PROCEDURE — 94640 AIRWAY INHALATION TREATMENT: CPT

## 2017-01-29 PROCEDURE — 36415 COLL VENOUS BLD VENIPUNCTURE: CPT | Performed by: INTERNAL MEDICINE

## 2017-01-29 RX ADMIN — POLYETHYLENE GLYCOL 3350 17 G: 17 POWDER, FOR SOLUTION ORAL at 09:40

## 2017-01-29 RX ADMIN — TIOTROPIUM BROMIDE 18 MCG: 18 CAPSULE ORAL; RESPIRATORY (INHALATION) at 07:35

## 2017-01-29 RX ADMIN — Medication 250 MG: at 09:39

## 2017-01-29 RX ADMIN — MONTELUKAST SODIUM 10 MG: 10 TABLET, FILM COATED ORAL at 21:01

## 2017-01-29 RX ADMIN — OXYCODONE HYDROCHLORIDE AND ACETAMINOPHEN 1 TABLET: 10; 325 TABLET ORAL at 16:30

## 2017-01-29 RX ADMIN — SOLIFENACIN SUCCINATE 5 MG: 5 TABLET, FILM COATED ORAL at 09:40

## 2017-01-29 RX ADMIN — LISINOPRIL 10 MG: 10 TABLET ORAL at 09:39

## 2017-01-29 RX ADMIN — ENOXAPARIN SODIUM 40 MG: 40 INJECTION SUBCUTANEOUS at 18:49

## 2017-01-29 RX ADMIN — OXYCODONE HYDROCHLORIDE AND ACETAMINOPHEN 1 TABLET: 10; 325 TABLET ORAL at 08:13

## 2017-01-29 RX ADMIN — FLUOXETINE 20 MG: 20 CAPSULE ORAL at 09:40

## 2017-01-29 RX ADMIN — AMLODIPINE BESYLATE 10 MG: 10 TABLET ORAL at 09:39

## 2017-01-29 RX ADMIN — DOXYCYCLINE HYCLATE 100 MG: 100 CAPSULE ORAL at 09:39

## 2017-01-29 RX ADMIN — ALPRAZOLAM 0.5 MG: 0.5 TABLET ORAL at 18:49

## 2017-01-29 RX ADMIN — FAMOTIDINE 20 MG: 20 TABLET ORAL at 09:40

## 2017-01-29 RX ADMIN — ALPRAZOLAM 0.5 MG: 0.5 TABLET ORAL at 09:44

## 2017-01-29 RX ADMIN — OXYCODONE HYDROCHLORIDE AND ACETAMINOPHEN 1 TABLET: 10; 325 TABLET ORAL at 20:53

## 2017-01-29 RX ADMIN — BUMETANIDE 0.5 MG: 1 TABLET ORAL at 09:39

## 2017-01-29 RX ADMIN — OXYCODONE HYDROCHLORIDE AND ACETAMINOPHEN 1 TABLET: 10; 325 TABLET ORAL at 01:19

## 2017-01-29 RX ADMIN — BUDESONIDE AND FORMOTEROL FUMARATE DIHYDRATE 2 PUFF: 160; 4.5 AEROSOL RESPIRATORY (INHALATION) at 07:35

## 2017-01-29 RX ADMIN — Medication 250 MG: at 18:49

## 2017-01-29 RX ADMIN — OXYCODONE HYDROCHLORIDE AND ACETAMINOPHEN 1 TABLET: 10; 325 TABLET ORAL at 12:37

## 2017-01-29 RX ADMIN — DOXYCYCLINE HYCLATE 100 MG: 100 CAPSULE ORAL at 20:53

## 2017-01-29 NOTE — ROUTINE PROCESS
Bedside and Verbal shift change report given to [address] [place] [address] (oncoming nurse) by Brandon Hinkle RN (offgoing nurse). Report included the following information SBAR, Kardex and MAR.

## 2017-01-29 NOTE — PROGRESS NOTES
[facility]  [facility]    Patient: Panfilo Smith Age: 45 y.o. : 1965 MR#: [id number] SSN: [id number]  Date/Time: 2017 3:16 PM    Subjective:     Gil, wearing bipap at night    Objective:   VS:   Visit Vitals    /84 (BP 1 Location: Right arm, BP Patient Position: At rest)    Pulse 75    Temp 97 °F (36.1 °C)    Resp 18    Ht 4' 11\" (1.499 m)    Wt 118.3 kg (260 lb 14.4 oz)    SpO2 99%    Breastfeeding No    BMI 52.7 kg/m2      Tmax/24hrs: Temp (24hrs), Av.4 °F (36.3 °C), Min:97 °F (36.1 °C), Max:98 °F (36.7 °C)     Input/Output:     Intake/Output Summary (Last 24 hours) at 17 1536  Last data filed at 17 1404   Gross per 24 hour   Intake              900 ml   Output                0 ml   Net              900 ml       General:  nad  Cardiovascular:  s1s2  Pulmonary:  Decreased bs  GI:  benign  Extremities:  No edema  Additional:      Labs:    Recent Results (from the past 24 hour(s))   GLUCOSE, POC    Collection Time: 17  5:41 PM   Result Value Ref Range    Glucose (POC) 98 70 - 110 mg/dL   GLUCOSE, POC    Collection Time: 17  8:29 PM   Result Value Ref Range    Glucose (POC) 120 (H) 70 - 110 mg/dL   CBC WITH AUTOMATED DIFF    Collection Time: 17  2:33 AM   Result Value Ref Range    WBC 6.7 4.6 - 13.2 K/uL    RBC 4.23 4.20 - 5.30 M/uL    HGB 12.3 12.0 - 16.0 g/dL    HCT 38.4 35.0 - 45.0 %    MCV 90.8 74.0 - 97.0 FL    MCH 29.1 24.0 - 34.0 PG    MCHC 32.0 31.0 - 37.0 g/dL    RDW 14.0 11.6 - 14.5 %    PLATELET 877 460 - 941 K/uL    MPV 11.1 9.2 - 11.8 FL    NEUTROPHILS 62 40 - 73 %    LYMPHOCYTES 33 21 - 52 %    MONOCYTES 4 3 - 10 %    EOSINOPHILS 1 0 - 5 %    BASOPHILS 0 0 - 2 %    ABS. NEUTROPHILS 4.2 1.8 - 8.0 K/UL    ABS. LYMPHOCYTES 2.2 0.9 - 3.6 K/UL    ABS. MONOCYTES 0.3 0.05 - 1.2 K/UL    ABS. EOSINOPHILS 0.1 0.0 - 0.4 K/UL    ABS.  BASOPHILS 0.0 0.0 - 0.1 K/UL    DF AUTOMATED     METABOLIC PANEL, BASIC Collection Time: 01/29/17  2:33 AM   Result Value Ref Range    Sodium 144 136 - 145 mmol/L    Potassium 3.8 3.5 - 5.5 mmol/L    Chloride 108 100 - 108 mmol/L    CO2 29 21 - 32 mmol/L    Anion gap 7 3.0 - 18 mmol/L    Glucose 96 74 - 99 mg/dL    BUN 17 7.0 - 18 MG/DL    Creatinine 0.94 0.6 - 1.3 MG/DL    BUN/Creatinine ratio 18 12 - 20      GFR est AA >60 >60 ml/min/1.73m2    GFR est non-AA >60 >60 ml/min/1.73m2    Calcium 8.7 8.5 - 10.1 MG/DL   GLUCOSE, POC    Collection Time: 01/29/17  8:43 AM   Result Value Ref Range    Glucose (POC) 118 (H) 70 - 110 mg/dL   GLUCOSE, POC    Collection Time: 01/29/17 11:06 AM   Result Value Ref Range    Glucose (POC) 115 (H) 70 - 110 mg/dL     Additional Data Reviewed:      Current Facility-Administered Medications   Medication Dose Route Frequency    oxyCODONE-acetaminophen (PERCOCET 10)  mg per tablet 1 Tab  1 Tab Oral Q4H PRN    tiotropium (SPIRIVA) inhalation capsule 18 mcg  1 Cap Inhalation DAILY    albuterol-ipratropium (DUO-NEB) 2.5 MG-0.5 MG/3 ML  3 mL Nebulization Q4H PRN    ALPRAZolam (XANAX) tablet 0.5 mg  0.5 mg Oral BID PRN    amLODIPine (NORVASC) tablet 10 mg  10 mg Oral DAILY    albuterol (PROVENTIL VENTOLIN) nebulizer solution 2.5 mg  2.5 mg Nebulization Q2H PRN    bumetanide (BUMEX) tablet 0.5 mg  0.5 mg Oral Q48H    budesonide-formoterol (SYMBICORT) 160-4.5 mcg/actuation HFA inhaler 2 Puff  2 Puff Inhalation BID RT    lisinopril (PRINIVIL, ZESTRIL) tablet 10 mg  10 mg Oral DAILY    famotidine (PEPCID) tablet 20 mg  20 mg Oral DAILY    montelukast (SINGULAIR) tablet 10 mg  10 mg Oral QHS    senna-docusate (PERICOLACE) 8.6-50 mg per tablet 2 Tab  2 Tab Oral QHS    solifenacin (VESICARE) tablet 5 mg  5 mg Oral DAILY    polyethylene glycol (MIRALAX) packet 17 g  17 g Oral DAILY    FLUoxetine (PROzac) capsule 20 mg  20 mg Oral DAILY    insulin lispro (HUMALOG) injection   SubCUTAneous AC&HS    glucose chewable tablet 16 g  4 Tab Oral PRN    glucagon (GLUCAGEN) injection 1 mg  1 mg IntraMUSCular PRN    dextrose (D50W) injection syrg 12.5-25 g  25-50 mL IntraVENous PRN    naloxone (NARCAN) injection 0.4 mg  0.4 mg IntraVENous PRN    enoxaparin (LOVENOX) injection 40 mg  40 mg SubCUTAneous Q24H    doxycycline (VIBRAMYCIN) capsule 100 mg  100 mg Oral Q12H    Saccharomyces boulardii (FLORASTOR) capsule 250 mg  250 mg Oral BID    hydrALAZINE (APRESOLINE) 20 mg/mL injection 10 mg  10 mg IntraVENous Q6H PRN       Assessment/Plan:     Patient Active Problem List   Diagnosis Code    Asthma exacerbation attacks J45. 0    Status asthmaticus with COPD (chronic obstructive pulmonary disease) ([facility]) J44.9, J45.902    Abnormal CT of the chest R93.8    Shortness of breath R06.02    Asthma J45.909    COPD exacerbation ([facility]) J44.1    Acute on chronic respiratory failure with hypoxemia ([facility]) J96.21    COPD with exacerbation ([facility]) J44.1    Acute exacerbation of chronic obstructive pulmonary disease (COPD) ([facility]) J44.1    Respiratory failure ([facility]) J96.90    Acute encephalopathy G93.40    Asthma exacerbation J45.901       Plan:    02, bd's, bipap at night  bp control  abx's  dvt prophylaxis       Case discussed with:  []Patient  []Family  []Nursing  []Case Management  DVT Prophylaxis:  []Lovenox  []Hep SQ  []SCDs  []Coumadin   []On Heparin gtt    Signed By: Rosio Balderas MD

## 2017-01-29 NOTE — PROGRESS NOTES
Pulmonology Progress Note    Subjective:     Danielle Zelaya is awake, alert and on no pressors, breathing comfortably and without complaints of pain. Using bipap at night    Current Facility-Administered Medications   Medication Dose Route Frequency    oxyCODONE-acetaminophen (PERCOCET 10)  mg per tablet 1 Tab  1 Tab Oral Q4H PRN    tiotropium (SPIRIVA) inhalation capsule 18 mcg  1 Cap Inhalation DAILY    albuterol-ipratropium (DUO-NEB) 2.5 MG-0.5 MG/3 ML  3 mL Nebulization Q4H PRN    ALPRAZolam (XANAX) tablet 0.5 mg  0.5 mg Oral BID PRN    amLODIPine (NORVASC) tablet 10 mg  10 mg Oral DAILY    albuterol (PROVENTIL VENTOLIN) nebulizer solution 2.5 mg  2.5 mg Nebulization Q2H PRN    bumetanide (BUMEX) tablet 0.5 mg  0.5 mg Oral Q48H    budesonide-formoterol (SYMBICORT) 160-4.5 mcg/actuation HFA inhaler 2 Puff  2 Puff Inhalation BID RT    lisinopril (PRINIVIL, ZESTRIL) tablet 10 mg  10 mg Oral DAILY    famotidine (PEPCID) tablet 20 mg  20 mg Oral DAILY    montelukast (SINGULAIR) tablet 10 mg  10 mg Oral QHS    senna-docusate (PERICOLACE) 8.6-50 mg per tablet 2 Tab  2 Tab Oral QHS    solifenacin (VESICARE) tablet 5 mg  5 mg Oral DAILY    polyethylene glycol (MIRALAX) packet 17 g  17 g Oral DAILY    FLUoxetine (PROzac) capsule 20 mg  20 mg Oral DAILY    insulin lispro (HUMALOG) injection   SubCUTAneous AC&HS    glucose chewable tablet 16 g  4 Tab Oral PRN    glucagon (GLUCAGEN) injection 1 mg  1 mg IntraMUSCular PRN    dextrose (D50W) injection syrg 12.5-25 g  25-50 mL IntraVENous PRN    naloxone (NARCAN) injection 0.4 mg  0.4 mg IntraVENous PRN    enoxaparin (LOVENOX) injection 40 mg  40 mg SubCUTAneous Q24H    doxycycline (VIBRAMYCIN) capsule 100 mg  100 mg Oral Q12H    Saccharomyces boulardii (FLORASTOR) capsule 250 mg  250 mg Oral BID    hydrALAZINE (APRESOLINE) 20 mg/mL injection 10 mg  10 mg IntraVENous Q6H PRN       Review of Systems:  Pertinent items are noted in HPI.     Objective: Visit Vitals    /84 (BP 1 Location: Right arm, BP Patient Position: At rest)    Pulse 75    Temp 97 °F (36.1 °C)    Resp 18    Ht 4' 11\" (1.499 m)    Wt 118.3 kg (260 lb 14.4 oz)    SpO2 99%    Breastfeeding No    BMI 52.7 kg/m2    O2 Flow Rate (L/min): 2 l/min O2 Device: Room air    01/27 1901 - 01/29 0700  In: 1500 [P.O.:1500]  Out: -   01/29 0701 - 01/29 1900  In: 210 [P.O.:210]  Out: -     Physical Exam:   Visit Vitals    /84 (BP 1 Location: Right arm, BP Patient Position: At rest)    Pulse 75    Temp 97 °F (36.1 °C)    Resp 18    Ht 4' 11\" (1.499 m)    Wt 118.3 kg (260 lb 14.4 oz)    SpO2 99%    Breastfeeding No    BMI 52.7 kg/m2     General:  Alert, cooperative, no distress, appears stated age. Head:  Normocephalic, without obvious abnormality, atraumatic. Eyes:  Conjunctivae/corneas clear. PERRL, EOMs intact. Fundi benign. Ears:  Normal TMs and external ear canals both ears. Nose: Nares normal. Septum midline. Mucosa normal. No drainage or sinus tenderness. Throat: Lips, mucosa, and tongue normal. Teeth and gums normal.   Neck: Supple, symmetrical, trachea midline, no adenopathy, thyroid: no enlargement/tenderness/nodules, no carotid bruit and no JVD. Back:   Symmetric, no curvature. ROM normal. No CVA tenderness. Lungs:   Clear to auscultation bilaterally. Chest wall:  No tenderness or deformity. Heart:  Regular rate and rhythm, S1, S2 normal, no murmur, click, rub or gallop. Breast Exam:  No tenderness, masses, or nipple abnormality. Abdomen:   Soft, non-tender. Bowel sounds normal. No masses,  No organomegaly. Genitalia:  Normal female without lesion, discharge or tenderness. Rectal:  Normal tone,  no masses or tenderness  Guaiac negative stool. Extremities: Extremities normal, atraumatic, no cyanosis or edema. Pulses: 2+ and symmetric all extremities. Skin: Skin color, texture, turgor normal. No rashes or lesions.    Lymph nodes: Cervical, supraclavicular, and axillary nodes normal.   Neurologic: CNII-XII intact. Normal strength, sensation and reflexes throughout. Data Review:    Recent Results (from the past 24 hour(s))   GLUCOSE, POC    Collection Time: 01/28/17  5:41 PM   Result Value Ref Range    Glucose (POC) 98 70 - 110 mg/dL   GLUCOSE, POC    Collection Time: 01/28/17  8:29 PM   Result Value Ref Range    Glucose (POC) 120 (H) 70 - 110 mg/dL   CBC WITH AUTOMATED DIFF    Collection Time: 01/29/17  2:33 AM   Result Value Ref Range    WBC 6.7 4.6 - 13.2 K/uL    RBC 4.23 4.20 - 5.30 M/uL    HGB 12.3 12.0 - 16.0 g/dL    HCT 38.4 35.0 - 45.0 %    MCV 90.8 74.0 - 97.0 FL    MCH 29.1 24.0 - 34.0 PG    MCHC 32.0 31.0 - 37.0 g/dL    RDW 14.0 11.6 - 14.5 %    PLATELET 622 182 - 666 K/uL    MPV 11.1 9.2 - 11.8 FL    NEUTROPHILS 62 40 - 73 %    LYMPHOCYTES 33 21 - 52 %    MONOCYTES 4 3 - 10 %    EOSINOPHILS 1 0 - 5 %    BASOPHILS 0 0 - 2 %    ABS. NEUTROPHILS 4.2 1.8 - 8.0 K/UL    ABS. LYMPHOCYTES 2.2 0.9 - 3.6 K/UL    ABS. MONOCYTES 0.3 0.05 - 1.2 K/UL    ABS. EOSINOPHILS 0.1 0.0 - 0.4 K/UL    ABS.  BASOPHILS 0.0 0.0 - 0.1 K/UL    DF AUTOMATED     METABOLIC PANEL, BASIC    Collection Time: 01/29/17  2:33 AM   Result Value Ref Range    Sodium 144 136 - 145 mmol/L    Potassium 3.8 3.5 - 5.5 mmol/L    Chloride 108 100 - 108 mmol/L    CO2 29 21 - 32 mmol/L    Anion gap 7 3.0 - 18 mmol/L    Glucose 96 74 - 99 mg/dL    BUN 17 7.0 - 18 MG/DL    Creatinine 0.94 0.6 - 1.3 MG/DL    BUN/Creatinine ratio 18 12 - 20      GFR est AA >60 >60 ml/min/1.73m2    GFR est non-AA >60 >60 ml/min/1.73m2    Calcium 8.7 8.5 - 10.1 MG/DL   GLUCOSE, POC    Collection Time: 01/29/17  8:43 AM   Result Value Ref Range    Glucose (POC) 118 (H) 70 - 110 mg/dL   GLUCOSE, POC    Collection Time: 01/29/17 11:06 AM   Result Value Ref Range    Glucose (POC) 115 (H) 70 - 110 mg/dL           Assessment:     Active Problems:    Shortness of breath (3/5/2016)      Asthma exacerbation (1/26/2017)        Plan:     Her home bipap needs to be repaired or replaced through her DME  Start home meds  Change neb rx to prn  Ok to be d/c'd home from pul standpoint but her bipap needs to be checked out

## 2017-01-30 VITALS
BODY MASS INDEX: 52.44 KG/M2 | WEIGHT: 260.14 LBS | HEART RATE: 99 BPM | SYSTOLIC BLOOD PRESSURE: 160 MMHG | HEIGHT: 59 IN | RESPIRATION RATE: 21 BRPM | OXYGEN SATURATION: 97 % | TEMPERATURE: 97.2 F | DIASTOLIC BLOOD PRESSURE: 89 MMHG

## 2017-01-30 LAB
GLUCOSE BLD STRIP.AUTO-MCNC: 122 MG/DL (ref 70–110)
THYROGLOB AB SERPL-ACNC: 2.8 IU/ML (ref 0–0.9)

## 2017-01-30 PROCEDURE — 74011250637 HC RX REV CODE- 250/637: Performed by: EMERGENCY MEDICINE

## 2017-01-30 PROCEDURE — 74011250637 HC RX REV CODE- 250/637: Performed by: INTERNAL MEDICINE

## 2017-01-30 PROCEDURE — 82962 GLUCOSE BLOOD TEST: CPT

## 2017-01-30 PROCEDURE — 97165 OT EVAL LOW COMPLEX 30 MIN: CPT

## 2017-01-30 RX ORDER — DOXYCYCLINE 100 MG/1
100 CAPSULE ORAL EVERY 12 HOURS
Qty: 14 CAP | Refills: 0 | Status: ON HOLD | OUTPATIENT
Start: 2017-01-30 | End: 2017-02-28

## 2017-01-30 RX ORDER — BUDESONIDE AND FORMOTEROL FUMARATE DIHYDRATE 160; 4.5 UG/1; UG/1
2 AEROSOL RESPIRATORY (INHALATION) 2 TIMES DAILY
Qty: 1 INHALER | Refills: 3 | Status: ON HOLD | OUTPATIENT
Start: 2017-01-30 | End: 2017-03-09

## 2017-01-30 RX ORDER — ALBUTEROL SULFATE 2.5 MG/.5ML
2.5 SOLUTION RESPIRATORY (INHALATION)
Qty: 50 ML | Refills: 1 | Status: SHIPPED | OUTPATIENT
Start: 2017-01-30 | End: 2017-01-30

## 2017-01-30 RX ORDER — OXYCODONE AND ACETAMINOPHEN 10; 325 MG/1; MG/1
1 TABLET ORAL
Qty: 40 TAB | Refills: 0 | Status: SHIPPED | OUTPATIENT
Start: 2017-01-30 | End: 2017-02-28

## 2017-01-30 RX ORDER — ALBUTEROL SULFATE 90 UG/1
2 AEROSOL, METERED RESPIRATORY (INHALATION)
Qty: 1 INHALER | Refills: 1 | Status: SHIPPED | OUTPATIENT
Start: 2017-01-30 | End: 2017-01-30

## 2017-01-30 RX ORDER — ALBUTEROL SULFATE 90 UG/1
2 AEROSOL, METERED RESPIRATORY (INHALATION)
Qty: 1 INHALER | Refills: 1 | Status: ON HOLD | OUTPATIENT
Start: 2017-01-30 | End: 2017-03-09

## 2017-01-30 RX ORDER — ALBUTEROL SULFATE 2.5 MG/.5ML
2.5 SOLUTION RESPIRATORY (INHALATION)
Qty: 50 ML | Refills: 1 | Status: SHIPPED | OUTPATIENT
Start: 2017-01-30 | End: 2017-02-28

## 2017-01-30 RX ORDER — ALPRAZOLAM 0.5 MG/1
2.5 TABLET ORAL 2 TIMES DAILY
Qty: 40 TAB | Refills: 0 | Status: SHIPPED | OUTPATIENT
Start: 2017-01-30 | End: 2017-03-09

## 2017-01-30 RX ADMIN — OXYCODONE HYDROCHLORIDE AND ACETAMINOPHEN 1 TABLET: 10; 325 TABLET ORAL at 13:09

## 2017-01-30 RX ADMIN — FAMOTIDINE 20 MG: 20 TABLET ORAL at 08:59

## 2017-01-30 RX ADMIN — Medication 250 MG: at 17:07

## 2017-01-30 RX ADMIN — DOXYCYCLINE HYCLATE 100 MG: 100 CAPSULE ORAL at 09:01

## 2017-01-30 RX ADMIN — OXYCODONE HYDROCHLORIDE AND ACETAMINOPHEN 1 TABLET: 10; 325 TABLET ORAL at 09:02

## 2017-01-30 RX ADMIN — FLUOXETINE 20 MG: 20 CAPSULE ORAL at 09:00

## 2017-01-30 RX ADMIN — OXYCODONE HYDROCHLORIDE AND ACETAMINOPHEN 1 TABLET: 10; 325 TABLET ORAL at 17:07

## 2017-01-30 RX ADMIN — SOLIFENACIN SUCCINATE 5 MG: 5 TABLET, FILM COATED ORAL at 09:00

## 2017-01-30 RX ADMIN — LISINOPRIL 10 MG: 10 TABLET ORAL at 09:01

## 2017-01-30 RX ADMIN — AMLODIPINE BESYLATE 10 MG: 10 TABLET ORAL at 08:59

## 2017-01-30 RX ADMIN — OXYCODONE HYDROCHLORIDE AND ACETAMINOPHEN 1 TABLET: 10; 325 TABLET ORAL at 00:45

## 2017-01-30 RX ADMIN — OXYCODONE HYDROCHLORIDE AND ACETAMINOPHEN 1 TABLET: 10; 325 TABLET ORAL at 04:45

## 2017-01-30 RX ADMIN — Medication 250 MG: at 08:59

## 2017-01-30 RX ADMIN — ALPRAZOLAM 0.5 MG: 0.5 TABLET ORAL at 10:19

## 2017-01-30 NOTE — ROUTINE PROCESS
1919 Received patient after report from off going nurse. Patient laying in bed talking on phone. No distress noted. Call bell within reach, siderails up x 3, bed in lowest position, and patient instructed to use call bell for assistance. Will continue to monitor. 0715 Bedside and Verbal shift change report given to Madelyn WAGGONER (oncoming nurse) by Fran Sung RN   (offgoing nurse). Report included the following information Kardex, Intake/Output, MAR and Recent Results.

## 2017-01-30 NOTE — ROUTINE PROCESS
Bedside shift change report given to James Munson RN (oncoming nurse) by Di Navarro RN (offgoing nurse). Report included the following information SBAR, Kardex and MAR.     5181 - IV removed. This writer has reviewed discharge instructions with patient at this time. Patient has verbalized understanding. Patient was provided with care notes to include side effects of medications. Armbands removed and shredded. Patient waiting on medications and ride. 200 - Patient's daughter picked up patient. Medications given to patient.

## 2017-01-30 NOTE — ROUTINE PROCESS
0800 Pt. Awake sitting up in bed alert and oriented x 4 no change in assessment no signs of distress pt. Reported to be feeling fine. 0930 Pt. Sitting up in chair watching tv and eating breakfast no complaints. 1200 no change in assessment no signs of distress pt. Reported to be feeling fine. 1400 Pt. Ambulating in room with no difficulty. 1500 no change in assessment . Sinus rhythm on tele. 1800 Pt. Sitting up in bed no change in assessment no signs of distress pt. Reported to be feeling fine.

## 2017-01-30 NOTE — DISCHARGE INSTRUCTIONS
DISCHARGE SUMMARY from Nurse    The following personal items are in your possession at time of discharge:    Dental Appliances: None  Visual Aid: None     Home Medications: None  Jewelry: None  Clothing: None  Other Valuables: None             PATIENT INSTRUCTIONS:    After general anesthesia or intravenous sedation, for 24 hours or while taking prescription Narcotics:  · Limit your activities  · Do not drive and operate hazardous machinery  · Do not make important personal or business decisions  · Do  not drink alcoholic beverages  · If you have not urinated within 8 hours after discharge, please contact your surgeon on call. Report the following to your surgeon:  · Excessive pain, swelling, redness or odor of or around the surgical area  · Temperature over 100.5  · Nausea and vomiting lasting longer than 4 hours or if unable to take medications  · Any signs of decreased circulation or nerve impairment to extremity: change in color, persistent  numbness, tingling, coldness or increase pain  · Any questions        What to do at Home:  Recommended activity: Activity as tolerated. If you experience any of the following symptoms shortness of breath, dizziness, chest pain, please follow up with MD or call 911. *  Please give a list of your current medications to your Primary Care Provider. *  Please update this list whenever your medications are discontinued, doses are      changed, or new medications (including over-the-counter products) are added. *  Please carry medication information at all times in case of emergency situations. These are general instructions for a healthy lifestyle:    No smoking/ No tobacco products/ Avoid exposure to second hand smoke    Surgeon General's Warning:  Quitting smoking now greatly reduces serious risk to your health.     Obesity, smoking, and sedentary lifestyle greatly increases your risk for illness    A healthy diet, regular physical exercise & weight monitoring are important for maintaining a healthy lifestyle    You may be retaining fluid if you have a history of heart failure or if you experience any of the following symptoms:  Weight gain of 3 pounds or more overnight or 5 pounds in a week, increased swelling in our hands or feet or shortness of breath while lying flat in bed. Please call your doctor as soon as you notice any of these symptoms; do not wait until your next office visit. Recognize signs and symptoms of STROKE:    F-face looks uneven    A-arms unable to move or move unevenly    S-speech slurred or non-existent    T-time-call 911 as soon as signs and symptoms begin-DO NOT go       Back to bed or wait to see if you get better-TIME IS BRAIN. Warning Signs of HEART ATTACK     Call 911 if you have these symptoms:   Chest discomfort. Most heart attacks involve discomfort in the center of the chest that lasts more than a few minutes, or that goes away and comes back. It can feel like uncomfortable pressure, squeezing, fullness, or pain.  Discomfort in other areas of the upper body. Symptoms can include pain or discomfort in one or both arms, the back, neck, jaw, or stomach.  Shortness of breath with or without chest discomfort.  Other signs may include breaking out in a cold sweat, nausea, or lightheadedness. Don't wait more than five minutes to call 911 - MINUTES MATTER! Fast action can save your life. Calling 911 is almost always the fastest way to get lifesaving treatment. Emergency Medical Services staff can begin treatment when they arrive -- up to an hour sooner than if someone gets to the hospital by car. The discharge information has been reviewed with the patient. The patient verbalized understanding. Discharge medications reviewed with the patient and appropriate educational materials and side effects teaching were provided. [employer] Activation    Thank you for requesting access to GitHub.  Please follow the instructions below to securely access and download your online medical record. [employer] allows you to send messages to your doctor, view your test results, renew your prescriptions, schedule appointments, and more. How Do I Sign Up? 1. In your internet browser, go to [email or web address]. 2. Click on the First Time User? Click Here link in the Sign In box. You will see the New Member Sign Up page. 3. Enter your [employer] Access Code exactly as it appears below. You will not need to use this code after youve completed the sign-up process. If you do not sign up before the expiration date, you must request a new code. [employer] Access Code: [email or web address]  Expires: 2017  5:59 PM (This is the date your [employer] access code will )    4. Enter the last four digits of your Social Security Number (xxxx) and Date of Birth (mm/dd/yyyy) as indicated and click Submit. You will be taken to the next sign-up page. 5. Create a [employer] ID. This will be your [employer] login ID and cannot be changed, so think of one that is secure and easy to remember. 6. Create a [employer] password. You can change your password at any time. 7. Enter your Password Reset Question and Answer. This can be used at a later time if you forget your password. 8. Enter your e-mail address. You will receive e-mail notification when new information is available in [address] 9. Click Sign Up. You can now view and download portions of your medical record. 10. Click the Download Summary menu link to download a portable copy of your medical information. Additional Information    If you have questions, please visit the Frequently Asked Questions section of the [employer] website at [email or web address] Remember, [employer] is NOT to be used for urgent needs. For medical emergencies, dial 911.       Patient armband removed and shredded

## 2017-01-30 NOTE — PROGRESS NOTES
Nohemy was provided with nebulizer machine prior to discharge. As far as the cpap machine goes, she will need to [facility]. Records indicate she has not used the machine since initial set up.

## 2017-01-30 NOTE — PROGRESS NOTES
[facility]   Pulmonary and Sleep Medicine     Pulmonary Progress Note    Name: Alena Mccartney   : 1965   MRN: [id number]   Date: 2017    [x]I have reviewed the flowsheet and previous days notes. Events, vitals, medications and notes from last 24 hours reviewed. IMPRESSION:   ·   Patient Active Problem List   Diagnosis Code    Asthma exacerbation attacks J45. 0    Status asthmaticus with COPD (chronic obstructive pulmonary disease) ([facility]) J44.9, J45.902    Abnormal CT of the chest R93.8    Shortness of breath R06.02    Asthma J45.909    COPD exacerbation ([facility]) J44.1    Acute on chronic respiratory failure with hypoxemia ([facility]) J96.21    COPD with exacerbation ([facility]) J44.1    Acute exacerbation of chronic obstructive pulmonary disease (COPD) ([facility]) J44.1    Respiratory failure ([facility]) J96.90    Acute encephalopathy G93.40    Asthma exacerbation J45. 901 ·       PLAN:   Continue BIPAP with current settings. Patient has been advised to use BIPAP at home on regular basis   Supplemental O2 via NC, titrate flow for goal SPO2> 88%  Continue bronchodilators- Symbicort, Spiriva, and albuterol. pulmonary hygiene care  Continue Singular. Continue Doxycycline, total duration for five days    Aspiration prevention bundle, head of the bed at 30' all times  DVT and PUD prophylaxis as per primary team   Will defer respective systems problem management to primary and other consultant and follow patient with primary and other team  Further recommendations will be based on the patient's response to recommended treatment and results of the investigation ordered. Subjective:  Patient with poorly controlled asthma /COPD with recurrent exacerbation presented with worsening ARAGON. Today, she reports interval improvement. Review of Systems   Constitutional: Negative. HENT: Negative. Eyes: Negative. Respiratory: Negative. Cardiovascular: Negative.     Gastrointestinal: Negative. Genitourinary: Negative. Musculoskeletal: Negative. Skin: Negative. Neurological: Negative. Endo/Heme/Allergies: Negative. Psychiatric/Behavioral: Negative. Vital Signs:    Blood pressure 160/89, pulse 99, temperature 97.2 °F (36.2 °C), resp. rate 21, height 4' 11\" (1.499 m), weight 118 kg (260 lb 2.3 oz), SpO2 97 %, not currently breastfeeding. Body mass index is 52.54 kg/(m^2). O2 Device: Room air   O2 Flow Rate (L/min): 2 l/min   Temp (24hrs), Av.7 °F (36.5 °C), Min:97.2 °F (36.2 °C), Max:98.1 °F (36.7 °C)       Intake/Output:   Last shift:         Last 3 shifts:  1901 -  0700  In: 5 [P.O.:]  Out: 750 [Urine:750]    Intake/Output Summary (Last 24 hours) at 17 1359  Last data filed at 17 0612   Gross per 24 hour   Intake             1155 ml   Output              550 ml   Net              605 ml       Physical Exam   Constitutional: She is oriented to person, place, and time. Obese    Eyes: EOM are normal. Pupils are equal, round, and reactive to light. Neck: Normal range of motion. Neck supple. Cardiovascular: Normal rate. Pulmonary/Chest: Effort normal.   Few expiratory wheezes    Abdominal: Soft. Bowel sounds are normal.   Musculoskeletal: Normal range of motion. Neurological: She is alert and oriented to person, place, and time. Skin: Skin is warm and dry.        DATA:   Current Facility-Administered Medications   Medication Dose Route Frequency    oxyCODONE-acetaminophen (PERCOCET 10)  mg per tablet 1 Tab  1 Tab Oral Q4H PRN    tiotropium (SPIRIVA) inhalation capsule 18 mcg  1 Cap Inhalation DAILY    albuterol-ipratropium (DUO-NEB) 2.5 MG-0.5 MG/3 ML  3 mL Nebulization Q4H PRN    ALPRAZolam (XANAX) tablet 0.5 mg  0.5 mg Oral BID PRN    amLODIPine (NORVASC) tablet 10 mg  10 mg Oral DAILY    albuterol (PROVENTIL VENTOLIN) nebulizer solution 2.5 mg  2.5 mg Nebulization Q2H PRN    bumetanide (BUMEX) tablet 0.5 mg  0.5 mg Oral Q48H    budesonide-formoterol (SYMBICORT) 160-4.5 mcg/actuation HFA inhaler 2 Puff  2 Puff Inhalation BID RT    lisinopril (PRINIVIL, ZESTRIL) tablet 10 mg  10 mg Oral DAILY    famotidine (PEPCID) tablet 20 mg  20 mg Oral DAILY    montelukast (SINGULAIR) tablet 10 mg  10 mg Oral QHS    senna-docusate (PERICOLACE) 8.6-50 mg per tablet 2 Tab  2 Tab Oral QHS    solifenacin (VESICARE) tablet 5 mg  5 mg Oral DAILY    polyethylene glycol (MIRALAX) packet 17 g  17 g Oral DAILY    FLUoxetine (PROzac) capsule 20 mg  20 mg Oral DAILY    insulin lispro (HUMALOG) injection   SubCUTAneous AC&HS    glucose chewable tablet 16 g  4 Tab Oral PRN    glucagon (GLUCAGEN) injection 1 mg  1 mg IntraMUSCular PRN    dextrose (D50W) injection syrg 12.5-25 g  25-50 mL IntraVENous PRN    naloxone (NARCAN) injection 0.4 mg  0.4 mg IntraVENous PRN    enoxaparin (LOVENOX) injection 40 mg  40 mg SubCUTAneous Q24H    doxycycline (VIBRAMYCIN) capsule 100 mg  100 mg Oral Q12H    Saccharomyces boulardii (FLORASTOR) capsule 250 mg  250 mg Oral BID    hydrALAZINE (APRESOLINE) 20 mg/mL injection 10 mg  10 mg IntraVENous Q6H PRN                    Labs:  Recent Results (from the past 24 hour(s))   GLUCOSE, POC    Collection Time: 01/29/17  3:54 PM   Result Value Ref Range    Glucose (POC) 103 70 - 110 mg/dL   GLUCOSE, POC    Collection Time: 01/29/17  8:35 PM   Result Value Ref Range    Glucose (POC) 143 (H) 70 - 110 mg/dL   GLUCOSE, POC    Collection Time: 01/30/17 11:54 AM   Result Value Ref Range    Glucose (POC) 122 (H) 70 - 110 mg/dL           No results for input(s): FIO2I, IFO2, HCO3I, IHCO3, HCOPOC, PCO2I, PCOPOC, IPHI, PHI, PHPOC, PO2I, PO2POC in the last 72 hours.     No lab exists for component: IPOC2  All Micro Results     Procedure Component Value Units Date/Time    CULTURE, RESPIRATORY/SPUTUM/BRONCH Brooke Shorty [[id number]] Collected:  01/26/17 1815    Order Status:  Canceled Specimen:  Sputum     INFLUENZA A & B AG (RAPID TEST) [[id number]] Collected:  01/26/17 0727    Order Status:  Completed Specimen:  Nasopharyngeal from Nasal washing Updated:  01/26/17 0815     Influenza A Antigen NEGATIVE          A negative result does not exclude influenza virus infection, seasonal or H1N1 due to suboptimal sensitivity. If influenza is circulating in your community, a diagnosis of influenza should be considered based on a patients clinical presentation and empiric antiviral treatment should be considered, if indicated. Influenza B Antigen NEGATIVE                    Imaging:  [x]I have personally reviewed the patients chest radiographs images and report with the patient      Results from Hospital Encounter encounter on 01/26/17   XR CHEST SNGL V   Narrative PROCEDURE:  Chest Portable    CPT code 37307    INDICATION:  Shortness of breath     COMPARISON:  1/13/17     FINDINGS:  Subtle hazy appearance in the left lower lung zone is felt to be  related to suboptimal penetration instead of focal infiltrate or consolidation. Both lungs are otherwise clear. The cardiac silhouette is mild to moderately  prominent. No pneumothorax. No pleural effusion. Of note, the trachea appears  narrowed at the thoracic inlet level with smooth borders. Impression IMPRESSION:    1. Prominence of the cardiac silhouette. Suggestive of cardiomegaly vs.  pericardial effusion. 2.  No infiltrate or consolidation. 3.  Narrowing of the trachea. Possibly related to external compression. Recommend either cross-sectional imaging correlation or ultrasound correlation. Results from [place] encounter on 01/26/17   CTA CHEST W WO CONT   Narrative CT chest with contrast for PE    CPT CODE: 55792     HISTORY: Dyspnea, Sudden onset. Recent hospitalized for COPD exacerbation    COMPARISON: None.     TECHNIQUE: Dynamic spiral scan through the chest is obtained from the thoracic  inlet to the diaphragm after dynamic nonionic IV contrast administration  per PE  protocol. Coronal and sagittal MIP computer reconstructions are also obtained  for better visualization of the integrity of pulmonary arteries in 3D dimension,  particularly for lobar/interlobar arterial branches and to minimize radiation  dose. All CT scans at this facility performed using dose optimization techniques as  appreciated to a performed exam, to include automated exposure control,  adjustment of the mA and or KU according to patient size (including appropriate  matching for site specific examination), or use of iterative reconstruction  technique. CONTRAST: 100 cc Isovue 370. FINDINGS:    PULMONARY ARTERIES: There is near nondiagnostic study for PE due to poor  contrast bolus injection. Moderate motion artifact also compromises the study. There is no definite evidence of intraluminal filling defects in mainstem  pulmonary arteries. The lobar and smaller branches are poorly opacified by  contrast for evaluation. No anterior dilatation seen. AORTA AND THE GREAT VESSELS: Unremarkable. LUNG PARENCHYMA: Respiratory motion artifact noted. Mild atelectatic changes  noted. No definite pulmonary infarction or consolidation identified. IMAGED THYROID: Heterogeneous appearing thyroid with likely multiple hypodense  lesions noted. Diffuse enlargement of thyroid appears similar to the prior  noncontrast CT. MEDIASTINUM: No adenopathy or mass. HEART: Borderline heart. No pericardial effusion. PLEURAL SPACES AND CHEST WALL: No significant pleural pathology. VISUALIZED UPPER ABDOMEN: The benign hepatic hypodense lesions in the left lobe  appears stable and measures 1.6 x 1.5 cm in segment 2 and the second one  measures 9 x 11 mm also in segment 2.    OSSEOUS STRUCTURES: Unremarkable. Impression IMPRESSION:    1. Near nondiagnostic study for PE secondary to poor contrast bolus as above. No definite PE in mainstem pulmonary arteries.  The lobar and smaller branches  are poorly seen for evaluation. If clinical concern of PE remains high, VQ scan  is advised. 2.  Mild atelectatic changes of the lungs. No pulmonary consolidation or  infarction seen. 3. Diffuse thyroid enlargement again noted. Multiple hypodense lesions  throughout both lobes and warrant thyroid ultrasound for better evaluation. 4. Stable hepatic hypodense lesions.     Thank you for your referral.           [x]See my orders for details    My assessment, plan of care, findings, medications, side effects etc were discussed with:  [x]nursing []PT/OT    []respiratory therapy []Dr. Rekha Mata []Patient       Ewelina Benitez MD, ALTAGRACIA

## 2017-01-30 NOTE — PROGRESS NOTES
[facility]  Progress Note    Patient: Coco Soto Age: 45 y.o. : 1965 MR#: [id number] SSN: [id number]  Date/Time: 2017 3:16 PM    Subjective:     Breathing improved, wants to stay another day    Objective:   VS:   Visit Vitals    /89 (BP 1 Location: Left arm, BP Patient Position: At rest)    Pulse 99    Temp 97.2 °F (36.2 °C)    Resp 21    Ht 4' 11\" (1.499 m)    Wt 118 kg (260 lb 2.3 oz)    SpO2 97%    Breastfeeding No    BMI 52.54 kg/m2      Tmax/24hrs: Temp (24hrs), Av.7 °F (36.5 °C), Min:97.2 °F (36.2 °C), Max:98.1 °F (36.7 °C)     Input/Output:     Intake/Output Summary (Last 24 hours) at 17 1403  Last data filed at 17 7881   Gross per 24 hour   Intake             1155 ml   Output              550 ml   Net              605 ml       General:  nad  Cardiovascular:  s1s2  Pulmonary:  Decreased bs  GI:  benign  Extremities:  No edema  Additional:      Labs:    Recent Results (from the past 24 hour(s))   GLUCOSE, POC    Collection Time: 17  3:54 PM   Result Value Ref Range    Glucose (POC) 103 70 - 110 mg/dL   GLUCOSE, POC    Collection Time: 17  8:35 PM   Result Value Ref Range    Glucose (POC) 143 (H) 70 - 110 mg/dL   GLUCOSE, POC    Collection Time: 17 11:54 AM   Result Value Ref Range    Glucose (POC) 122 (H) 70 - 110 mg/dL     Additional Data Reviewed:      Current Facility-Administered Medications   Medication Dose Route Frequency    oxyCODONE-acetaminophen (PERCOCET 10)  mg per tablet 1 Tab  1 Tab Oral Q4H PRN    tiotropium (SPIRIVA) inhalation capsule 18 mcg  1 Cap Inhalation DAILY    albuterol-ipratropium (DUO-NEB) 2.5 MG-0.5 MG/3 ML  3 mL Nebulization Q4H PRN    ALPRAZolam (XANAX) tablet 0.5 mg  0.5 mg Oral BID PRN    amLODIPine (NORVASC) tablet 10 mg  10 mg Oral DAILY    albuterol (PROVENTIL VENTOLIN) nebulizer solution 2.5 mg  2.5 mg Nebulization Q2H PRN    bumetanide (BUMEX) tablet 0.5 mg  0.5 mg Oral Q48H    budesonide-formoterol (SYMBICORT) 160-4.5 mcg/actuation HFA inhaler 2 Puff  2 Puff Inhalation BID RT    lisinopril (PRINIVIL, ZESTRIL) tablet 10 mg  10 mg Oral DAILY    famotidine (PEPCID) tablet 20 mg  20 mg Oral DAILY    montelukast (SINGULAIR) tablet 10 mg  10 mg Oral QHS    senna-docusate (PERICOLACE) 8.6-50 mg per tablet 2 Tab  2 Tab Oral QHS    solifenacin (VESICARE) tablet 5 mg  5 mg Oral DAILY    polyethylene glycol (MIRALAX) packet 17 g  17 g Oral DAILY    FLUoxetine (PROzac) capsule 20 mg  20 mg Oral DAILY    insulin lispro (HUMALOG) injection   SubCUTAneous AC&HS    glucose chewable tablet 16 g  4 Tab Oral PRN    glucagon (GLUCAGEN) injection 1 mg  1 mg IntraMUSCular PRN    dextrose (D50W) injection syrg 12.5-25 g  25-50 mL IntraVENous PRN    naloxone (NARCAN) injection 0.4 mg  0.4 mg IntraVENous PRN    enoxaparin (LOVENOX) injection 40 mg  40 mg SubCUTAneous Q24H    doxycycline (VIBRAMYCIN) capsule 100 mg  100 mg Oral Q12H    Saccharomyces boulardii (FLORASTOR) capsule 250 mg  250 mg Oral BID    hydrALAZINE (APRESOLINE) 20 mg/mL injection 10 mg  10 mg IntraVENous Q6H PRN       Assessment/Plan:     Patient Active Problem List   Diagnosis Code    Asthma exacerbation attacks J45. 0    Status asthmaticus with COPD (chronic obstructive pulmonary disease) ([facility]) J44.9, J45.902    Abnormal CT of the chest R93.8    Shortness of breath R06.02    Asthma J45.909    COPD exacerbation ([facility]) J44.1    Acute on chronic respiratory failure with hypoxemia ([facility]) J96.21    COPD with exacerbation ([facility]) J44.1    Acute exacerbation of chronic obstructive pulmonary disease (COPD) ([facility]) J44.1    Respiratory failure ([facility]) J96.90    Acute encephalopathy G93.40    Asthma exacerbation J45.901       Plan:    02, bd's, bipap at night  bp control  abx's  dvt prophylaxis       Case discussed with:  []Patient  []Family  []Nursing  []Case Management  DVT Prophylaxis:  []Lovenox  []Hep SQ  []SCDs  []Coumadin   []On Heparin gtt    Signed By: Ayaka Moya MD

## 2017-01-30 NOTE — PROGRESS NOTES
Problem: Self Care Deficits Care Plan (Adult)  Goal: *Therapy Goal (Edit Goal, Insert Text)  Outcome: Resolved/Met Date Met:  01/30/17  OCCUPATIONAL THERAPY EVALUATION/DISCHARGE     Patient: Ewelina Arguelles (52 y.o. female)  Date: 1/30/2017  Primary Diagnosis: Shortness of breath  Asthma exacerbation  Precautions: fall         ASSESSMENT AND RECOMMENDATIONS:  Based on the objective data described below, the patient presents at her baseline level of function. Feel she would benefit from adaptive equipment for LB dressing; however she is being discharged this afternoon. Will defer that to home health OT and skilled acute care occupational therapy is not indicated at this time. Discharge Recommendations: Home Health  Further Equipment Recommendations for Discharge: N/A       COMPLEXITY      Eval Complexity: History: LOW Complexity : Brief history review ; Examination: LOW Complexity : 1-3 performance deficits relating to physical, cognitive , or psychosocial skils that result in activity limitations and / or participation restrictions ; Decision Making:LOW Complexity : No comorbidities that affect functional and no verbal or physical assistance needed to complete eval tasks  Assessment: LOW Complexity          G-CODES:      Self Care  Current  CI= 1-19%   Goal  CI= 1-19%   D/C  CI= 1-19%. The severity rating is based on the Level of Assistance required for Functional Mobility and ADLs. SUBJECTIVE:   Patient stated  Im good having my kids help me get my socks and shoes on every day.       OBJECTIVE DATA SUMMARY:       Past Medical History   Diagnosis Date    Asthma      Chronic obstructive pulmonary disease ([facility] [address]      Endocrine disease         thyroid issues    Gastrointestinal disorder         \"blockage in my stomach\"    Hypertension     History reviewed. No pertinent past surgical history.   Prior Level of Function/Home Situation:   Home Situation  Home Environment: Private residence  One/Two Story Residence: One story  Living Alone: No  Support Systems: Child(anjelica)  Patient Expects to be Discharged to[de-identified] Private residence  Current DME Used/Available at Home: Trilby Goodell, straight, Wheelchair  [X]     Right hand dominant       [ ]     Left hand dominant  Cognitive/Behavioral Status:   she is alert, oriented x 4   Skin: no skin integrity issues noted  Edema: no extremity edema noted   Vision/Perceptual:       Tracking is [facility]    Coordination:   BUE's WFL   Balance:   needs a walker /furniture walks  Strength:  BUE's 4/5   Tone & Sensation:  BUE's WFL   Range of Motion:  BUE's AROM is WFL   Functional Mobility and Transfers for ADLs:  Bed Mobility:   supervision   Transfers:   min assist (she is inconsistent and reports that her knees just give out)   ADL Assessment:   self feeding: independent  Grooming: set up secondary to decreased standing this afternoon  UB bathing/dressing: Independent (simulated)  LB bathing/dressing: mod assist  Pain:  Pt reports 0/10 pain or discomfort prior to treatment. Pt reports 0/10 pain or discomfort post treatment. Activity Tolerance:   She has generalized fatigue which limits her activity participation  Please refer to the flowsheet for vital signs taken during this treatment. After treatment:   [ ]  Patient left in no apparent distress sitting up in chair  [X]  Patient left in no apparent distress in bed  [X]  Call bell left within reach  [ ]  Nursing notified  [ ]  Caregiver present  [ ]  Bed alarm activated      COMMUNICATION/EDUCATION:   Communication/Collaboration:  [ ]      Home safety education was provided and the patient/caregiver indicated understanding. [X]      Patient/family have participated as able and agree with findings and recommendations. [ ]      Patient is unable to participate in plan of care at this time.      Everlene Foots, OTR/L  Time Calculation: 14 mins

## 2017-02-01 NOTE — DISCHARGE SUMMARY
[address]  [address] [place] [place] SUMMARY    Name:  Lupillo Valdes  MR#:  [id number]  :  1965  Account #:  [de-identified]  Date of Adm:  2017  Date of Discharge:  2017      DISCHARGE DIAGNOSES:  1. Hypoxic respiratory failure secondary to persistent asthma. 2. Chronic obstructive pulmonary disease. SECONDARY DIAGNOSES:  1. Chronic respiratory failure. 2. Obstructive sleep apnea for which she uses CPAP at night. 3. History of polysubstance abuse and dependence. 4. Chronic diastolic heart failure. CONSULTANTS: Pulmonary. PROCEDURES: This is going to include: Chest x-ray 2017 -  prominence of the cardiac silhouette, suggestive of cardiomegaly  versus pericardial effusion, no infiltrate or consolidation, narrowing of  the trachea, possibly related to external compression. HISTORY OF PRESENT ILLNESS AND HOSPITAL COURSE: The  patient is a 54-year-old female. She has a past medical history as  mentioned above. She is well known to the hospitalist service. She has  had multiple admissions and discharges. Apparently she was recently  discharged from [facility]. She was admitted  from the emergency department with a diagnosis of hypoxic respiratory  failure. She was placed on oxygen, bronchodilators, steroids. She was  also placed on BiPAP at night as she has this. Her breathing has  improved over her hospital course. She is cleared by Pulmonary. She  is also discharged on doxycycline for several more days. Prior to being  discharged, home health was consulted for a new BiPAP machine. She is discharged home. DISCHARGE MEDICATIONS:  1. Percocet p.r.n.  2. Spiriva 18 mcg daily. 3. DuoNeb as needed. 4. Xanax as needed. 5. Norvasc 10 daily. 6. Bumex 0.5 every 48 hours. 7. Symbicort 2 puffs twice daily. 8. Lisinopril 10 daily. 9. Pepcid 20 daily. 10. Singulair 10 at night. 11. VESIcare 5 daily. 12. MiraLax.   13. Doxycycline 100 twice daily, #10.    DISCHARGE INSTRUCTIONS: She is discharged home. She should  be on a cardiac diet. Follow up with her PCP and Pulmonary as  scheduled.         Asuncion Beyer MD [facility] / Andry Quintana  D:  01/31/2017   15:20  T:  02/01/2017   08:31  Job #:  [id number]

## 2017-02-08 LAB
ARTERIAL PATENCY WRIST A: YES
BASE DEFICIT BLD-SCNC: 4 MMOL/L
BDY SITE: ABNORMAL
GAS FLOW.O2 O2 DELIVERY SYS: ABNORMAL L/MIN
HCO3 BLD-SCNC: 23.1 MMOL/L (ref 22–26)
O2/TOTAL GAS SETTING VFR VENT: 35 %
PCO2 BLD: 49.8 MMHG (ref 35–45)
PEEP RESPIRATORY: 5 CMH2O
PH BLD: 7.27 [PH] (ref 7.35–7.45)
PO2 BLD: 30 MMHG (ref 80–100)
PRESSURE SUPPORT SETTING VENT: 7 CMH2O
SAO2 % BLD: 49 % (ref 92–97)
SERVICE CMNT-IMP: ABNORMAL
SPECIMEN TYPE: ABNORMAL
TOTAL RESP. RATE, ITRR: 19
VENTILATION MODE VENT: ABNORMAL

## 2017-02-09 LAB — THYROGLOBULIN, 803881: 50 NG/ML

## 2017-02-26 ENCOUNTER — HOSPITAL ENCOUNTER (INPATIENT)
Age: 52
LOS: 1 days | Discharge: HOME | DRG: 133 | End: 2017-02-28
Attending: EMERGENCY MEDICINE | Admitting: HOSPITALIST
Payer: MEDICAID

## 2017-02-26 ENCOUNTER — APPOINTMENT (OUTPATIENT)
Dept: GENERAL RADIOLOGY | Age: 52
DRG: 133 | End: 2017-02-26
Attending: EMERGENCY MEDICINE
Payer: MEDICAID

## 2017-02-26 DIAGNOSIS — J44.1 ACUTE EXACERBATION OF CHRONIC OBSTRUCTIVE PULMONARY DISEASE (COPD) (HCC): Primary | ICD-10-CM

## 2017-02-26 PROBLEM — J96.02 ACUTE RESPIRATORY FAILURE WITH HYPERCAPNIA (HCC): Status: ACTIVE | Noted: 2017-02-26

## 2017-02-26 LAB
ANION GAP BLD CALC-SCNC: 3 MMOL/L (ref 3–18)
ARTERIAL PATENCY WRIST A: YES
ATRIAL RATE: 94 BPM
BASE DEFICIT BLD-SCNC: 3 MMOL/L
BASOPHILS # BLD AUTO: 0 K/UL (ref 0–0.1)
BASOPHILS # BLD: 0 % (ref 0–2)
BDY SITE: ABNORMAL
BNP SERPL-MCNC: 133 PG/ML (ref 0–900)
BODY TEMPERATURE: 98.6
BUN SERPL-MCNC: 22 MG/DL (ref 7–18)
BUN/CREAT SERPL: 18 (ref 12–20)
CALCIUM SERPL-MCNC: 8.9 MG/DL (ref 8.5–10.1)
CALCULATED P AXIS, ECG09: 57 DEGREES
CALCULATED R AXIS, ECG10: 44 DEGREES
CALCULATED T AXIS, ECG11: 20 DEGREES
CHLORIDE SERPL-SCNC: 104 MMOL/L (ref 100–108)
CK MB CFR SERPL CALC: 1.2 % (ref 0–4)
CK MB SERPL-MCNC: 1.8 NG/ML (ref 5–25)
CK SERPL-CCNC: 154 U/L (ref 26–192)
CO2 SERPL-SCNC: 31 MMOL/L (ref 21–32)
CREAT SERPL-MCNC: 1.19 MG/DL (ref 0.6–1.3)
DIAGNOSIS, 93000: NORMAL
DIFFERENTIAL METHOD BLD: NORMAL
EOSINOPHIL # BLD: 0.1 K/UL (ref 0–0.4)
EOSINOPHIL NFR BLD: 1 % (ref 0–5)
ERYTHROCYTE [DISTWIDTH] IN BLOOD BY AUTOMATED COUNT: 13.2 % (ref 11.6–14.5)
GAS FLOW.O2 O2 DELIVERY SYS: ABNORMAL L/MIN
GLUCOSE SERPL-MCNC: 118 MG/DL (ref 74–99)
HCO3 BLD-SCNC: 24.6 MMOL/L (ref 22–26)
HCT VFR BLD AUTO: 42 % (ref 35–45)
HGB BLD-MCNC: 13.7 G/DL (ref 12–16)
LACTATE BLD-SCNC: 0.7 MMOL/L (ref 0.4–2)
LYMPHOCYTES # BLD AUTO: 22 % (ref 21–52)
LYMPHOCYTES # BLD: 1.7 K/UL (ref 0.9–3.6)
MCH RBC QN AUTO: 29.1 PG (ref 24–34)
MCHC RBC AUTO-ENTMCNC: 32.6 G/DL (ref 31–37)
MCV RBC AUTO: 89.4 FL (ref 74–97)
MONOCYTES # BLD: 0.6 K/UL (ref 0.05–1.2)
MONOCYTES NFR BLD AUTO: 7 % (ref 3–10)
NEUTS SEG # BLD: 5.6 K/UL (ref 1.8–8)
NEUTS SEG NFR BLD AUTO: 70 % (ref 40–73)
O2/TOTAL GAS SETTING VFR VENT: 50 %
P-R INTERVAL, ECG05: 134 MS
PCO2 BLD: 55.2 MMHG (ref 35–45)
PEEP RESPIRATORY: 5 CMH2O
PH BLD: 7.26 [PH] (ref 7.35–7.45)
PIP ISTAT,IPIP: 14
PLATELET # BLD AUTO: 204 K/UL (ref 135–420)
PMV BLD AUTO: 10.9 FL (ref 9.2–11.8)
PO2 BLD: 132 MMHG (ref 80–100)
POTASSIUM SERPL-SCNC: 3.7 MMOL/L (ref 3.5–5.5)
PRESSURE SUPPORT SETTING VENT: 9 CMH2O
Q-T INTERVAL, ECG07: 368 MS
QRS DURATION, ECG06: 78 MS
QTC CALCULATION (BEZET), ECG08: 460 MS
RBC # BLD AUTO: 4.7 M/UL (ref 4.2–5.3)
SAO2 % BLD: 98 % (ref 92–97)
SERVICE CMNT-IMP: ABNORMAL
SODIUM SERPL-SCNC: 138 MMOL/L (ref 136–145)
SPECIMEN TYPE: ABNORMAL
TOTAL RESP. RATE, ITRR: 12
TROPONIN I SERPL-MCNC: <0.02 NG/ML (ref 0–0.04)
VENTRICULAR RATE, ECG03: 94 BPM
WBC # BLD AUTO: 8 K/UL (ref 4.6–13.2)

## 2017-02-26 PROCEDURE — 77030013140 HC MSK NEB VYRM -A

## 2017-02-26 PROCEDURE — 93005 ELECTROCARDIOGRAM TRACING: CPT

## 2017-02-26 PROCEDURE — 82803 BLOOD GASES ANY COMBINATION: CPT

## 2017-02-26 PROCEDURE — 99285 EMERGENCY DEPT VISIT HI MDM: CPT

## 2017-02-26 PROCEDURE — 71010 XR CHEST PORT: CPT

## 2017-02-26 PROCEDURE — 96365 THER/PROPH/DIAG IV INF INIT: CPT

## 2017-02-26 PROCEDURE — 94640 AIRWAY INHALATION TREATMENT: CPT

## 2017-02-26 PROCEDURE — 74011000250 HC RX REV CODE- 250: Performed by: EMERGENCY MEDICINE

## 2017-02-26 PROCEDURE — 82550 ASSAY OF CK (CPK): CPT | Performed by: EMERGENCY MEDICINE

## 2017-02-26 PROCEDURE — 74011000258 HC RX REV CODE- 258: Performed by: HOSPITALIST

## 2017-02-26 PROCEDURE — 83605 ASSAY OF LACTIC ACID: CPT

## 2017-02-26 PROCEDURE — 74011250636 HC RX REV CODE- 250/636: Performed by: EMERGENCY MEDICINE

## 2017-02-26 PROCEDURE — 74011250637 HC RX REV CODE- 250/637: Performed by: HOSPITALIST

## 2017-02-26 PROCEDURE — 74011250636 HC RX REV CODE- 250/636: Performed by: HOSPITALIST

## 2017-02-26 PROCEDURE — 74011000250 HC RX REV CODE- 250: Performed by: HOSPITALIST

## 2017-02-26 PROCEDURE — 74011000250 HC RX REV CODE- 250: Performed by: INTERNAL MEDICINE

## 2017-02-26 PROCEDURE — 80048 BASIC METABOLIC PNL TOTAL CA: CPT | Performed by: EMERGENCY MEDICINE

## 2017-02-26 PROCEDURE — 94660 CPAP INITIATION&MGMT: CPT

## 2017-02-26 PROCEDURE — 85025 COMPLETE CBC W/AUTO DIFF WBC: CPT | Performed by: EMERGENCY MEDICINE

## 2017-02-26 PROCEDURE — 96375 TX/PRO/DX INJ NEW DRUG ADDON: CPT

## 2017-02-26 PROCEDURE — 83880 ASSAY OF NATRIURETIC PEPTIDE: CPT | Performed by: EMERGENCY MEDICINE

## 2017-02-26 PROCEDURE — 36600 WITHDRAWAL OF ARTERIAL BLOOD: CPT

## 2017-02-26 RX ORDER — SOLIFENACIN SUCCINATE 5 MG/1
5 TABLET, FILM COATED ORAL DAILY
Status: DISCONTINUED | OUTPATIENT
Start: 2017-02-26 | End: 2017-02-28 | Stop reason: HOSPADM

## 2017-02-26 RX ORDER — ALBUTEROL SULFATE 0.83 MG/ML
2.5 SOLUTION RESPIRATORY (INHALATION)
Status: DISCONTINUED | OUTPATIENT
Start: 2017-02-26 | End: 2017-02-26

## 2017-02-26 RX ORDER — ALBUTEROL SULFATE 0.83 MG/ML
2.5 SOLUTION RESPIRATORY (INHALATION)
Status: COMPLETED | OUTPATIENT
Start: 2017-02-26 | End: 2017-02-26

## 2017-02-26 RX ORDER — MAGNESIUM SULFATE HEPTAHYDRATE 40 MG/ML
2 INJECTION, SOLUTION INTRAVENOUS
Status: COMPLETED | OUTPATIENT
Start: 2017-02-26 | End: 2017-02-26

## 2017-02-26 RX ORDER — GUAIFENESIN 600 MG/1
1200 TABLET, EXTENDED RELEASE ORAL 2 TIMES DAILY
Status: DISCONTINUED | OUTPATIENT
Start: 2017-02-26 | End: 2017-02-28 | Stop reason: HOSPADM

## 2017-02-26 RX ORDER — BUDESONIDE AND FORMOTEROL FUMARATE DIHYDRATE 160; 4.5 UG/1; UG/1
2 AEROSOL RESPIRATORY (INHALATION) 2 TIMES DAILY
Status: DISCONTINUED | OUTPATIENT
Start: 2017-02-26 | End: 2017-02-28 | Stop reason: HOSPADM

## 2017-02-26 RX ORDER — MORPHINE SULFATE 2 MG/ML
2 INJECTION, SOLUTION INTRAMUSCULAR; INTRAVENOUS
Status: DISCONTINUED | OUTPATIENT
Start: 2017-02-26 | End: 2017-02-28 | Stop reason: HOSPADM

## 2017-02-26 RX ORDER — NALBUPHINE HYDROCHLORIDE 10 MG/ML
10 INJECTION, SOLUTION INTRAMUSCULAR; INTRAVENOUS; SUBCUTANEOUS
Status: ACTIVE | OUTPATIENT
Start: 2017-02-26 | End: 2017-02-26

## 2017-02-26 RX ORDER — FUROSEMIDE 10 MG/ML
40 INJECTION INTRAMUSCULAR; INTRAVENOUS 3 TIMES DAILY
Status: DISCONTINUED | OUTPATIENT
Start: 2017-02-26 | End: 2017-02-26

## 2017-02-26 RX ORDER — OXYCODONE AND ACETAMINOPHEN 10; 325 MG/1; MG/1
1 TABLET ORAL
Status: DISCONTINUED | OUTPATIENT
Start: 2017-02-26 | End: 2017-02-28 | Stop reason: HOSPADM

## 2017-02-26 RX ORDER — AMOXICILLIN 250 MG
2 CAPSULE ORAL
Status: DISCONTINUED | OUTPATIENT
Start: 2017-02-26 | End: 2017-02-28 | Stop reason: HOSPADM

## 2017-02-26 RX ORDER — DOXYCYCLINE 100 MG/1
100 CAPSULE ORAL EVERY 12 HOURS
Status: DISCONTINUED | OUTPATIENT
Start: 2017-02-26 | End: 2017-02-26

## 2017-02-26 RX ORDER — LISINOPRIL 20 MG/1
20 TABLET ORAL DAILY
Status: DISCONTINUED | OUTPATIENT
Start: 2017-02-26 | End: 2017-02-28 | Stop reason: HOSPADM

## 2017-02-26 RX ORDER — ALBUTEROL SULFATE 0.83 MG/ML
2.5 SOLUTION RESPIRATORY (INHALATION)
Status: DISCONTINUED | OUTPATIENT
Start: 2017-02-26 | End: 2017-02-28

## 2017-02-26 RX ORDER — SODIUM CHLORIDE 0.9 % (FLUSH) 0.9 %
5-10 SYRINGE (ML) INJECTION EVERY 8 HOURS
Status: DISCONTINUED | OUTPATIENT
Start: 2017-02-26 | End: 2017-02-28 | Stop reason: HOSPADM

## 2017-02-26 RX ORDER — FAMOTIDINE 20 MG/1
20 TABLET, FILM COATED ORAL DAILY
Status: DISCONTINUED | OUTPATIENT
Start: 2017-02-26 | End: 2017-02-28 | Stop reason: HOSPADM

## 2017-02-26 RX ORDER — HEPARIN SODIUM 5000 [USP'U]/ML
5000 INJECTION, SOLUTION INTRAVENOUS; SUBCUTANEOUS EVERY 8 HOURS
Status: DISCONTINUED | OUTPATIENT
Start: 2017-02-26 | End: 2017-02-28 | Stop reason: HOSPADM

## 2017-02-26 RX ORDER — AMLODIPINE BESYLATE 5 MG/1
5 TABLET ORAL DAILY
Status: DISCONTINUED | OUTPATIENT
Start: 2017-02-26 | End: 2017-02-27

## 2017-02-26 RX ORDER — BUMETANIDE 0.5 MG/1
0.5 TABLET ORAL
Status: DISCONTINUED | OUTPATIENT
Start: 2017-02-26 | End: 2017-02-26

## 2017-02-26 RX ORDER — MONTELUKAST SODIUM 10 MG/1
10 TABLET ORAL
Status: DISCONTINUED | OUTPATIENT
Start: 2017-02-26 | End: 2017-02-28 | Stop reason: HOSPADM

## 2017-02-26 RX ORDER — FUROSEMIDE 10 MG/ML
40 INJECTION INTRAMUSCULAR; INTRAVENOUS EVERY 12 HOURS
Status: DISCONTINUED | OUTPATIENT
Start: 2017-02-27 | End: 2017-02-28 | Stop reason: HOSPADM

## 2017-02-26 RX ORDER — SODIUM CHLORIDE 0.9 % (FLUSH) 0.9 %
5-10 SYRINGE (ML) INJECTION AS NEEDED
Status: DISCONTINUED | OUTPATIENT
Start: 2017-02-26 | End: 2017-02-28 | Stop reason: HOSPADM

## 2017-02-26 RX ORDER — IPRATROPIUM BROMIDE 0.5 MG/2.5ML
0.5 SOLUTION RESPIRATORY (INHALATION)
Status: DISCONTINUED | OUTPATIENT
Start: 2017-02-26 | End: 2017-02-28

## 2017-02-26 RX ORDER — NALOXONE HYDROCHLORIDE 0.4 MG/ML
0.4 INJECTION, SOLUTION INTRAMUSCULAR; INTRAVENOUS; SUBCUTANEOUS AS NEEDED
Status: DISCONTINUED | OUTPATIENT
Start: 2017-02-26 | End: 2017-02-28 | Stop reason: HOSPADM

## 2017-02-26 RX ORDER — FLUOXETINE HYDROCHLORIDE 20 MG/1
60 CAPSULE ORAL DAILY
Status: DISCONTINUED | OUTPATIENT
Start: 2017-02-26 | End: 2017-02-28 | Stop reason: HOSPADM

## 2017-02-26 RX ORDER — SODIUM CHLORIDE 9 MG/ML
100 INJECTION, SOLUTION INTRAVENOUS CONTINUOUS
Status: DISCONTINUED | OUTPATIENT
Start: 2017-02-26 | End: 2017-02-26

## 2017-02-26 RX ADMIN — OXYCODONE HYDROCHLORIDE AND ACETAMINOPHEN 1 TABLET: 10; 325 TABLET ORAL at 19:36

## 2017-02-26 RX ADMIN — DOXYCYCLINE 100 MG: 100 INJECTION, POWDER, LYOPHILIZED, FOR SOLUTION INTRAVENOUS at 21:44

## 2017-02-26 RX ADMIN — SOLIFENACIN SUCCINATE 5 MG: 5 TABLET, FILM COATED ORAL at 08:26

## 2017-02-26 RX ADMIN — METHYLPREDNISOLONE SODIUM SUCCINATE 60 MG: 125 INJECTION, POWDER, FOR SOLUTION INTRAMUSCULAR; INTRAVENOUS at 18:25

## 2017-02-26 RX ADMIN — Medication 2 MG: at 18:25

## 2017-02-26 RX ADMIN — METHYLPREDNISOLONE SODIUM SUCCINATE 60 MG: 125 INJECTION, POWDER, FOR SOLUTION INTRAMUSCULAR; INTRAVENOUS at 08:26

## 2017-02-26 RX ADMIN — OXYCODONE HYDROCHLORIDE AND ACETAMINOPHEN 1 TABLET: 10; 325 TABLET ORAL at 14:54

## 2017-02-26 RX ADMIN — Medication 10 ML: at 22:00

## 2017-02-26 RX ADMIN — IPRATROPIUM BROMIDE 0.5 MG: 0.5 SOLUTION RESPIRATORY (INHALATION) at 19:36

## 2017-02-26 RX ADMIN — ALBUTEROL SULFATE 2.5 MG: 2.5 SOLUTION RESPIRATORY (INHALATION) at 02:48

## 2017-02-26 RX ADMIN — SODIUM CHLORIDE 100 ML/HR: 900 INJECTION, SOLUTION INTRAVENOUS at 07:19

## 2017-02-26 RX ADMIN — ALBUTEROL SULFATE 2.5 MG: 2.5 SOLUTION RESPIRATORY (INHALATION) at 21:44

## 2017-02-26 RX ADMIN — FAMOTIDINE 20 MG: 20 TABLET ORAL at 08:25

## 2017-02-26 RX ADMIN — IPRATROPIUM BROMIDE 0.5 MG: 0.5 SOLUTION RESPIRATORY (INHALATION) at 16:30

## 2017-02-26 RX ADMIN — FLUOXETINE 60 MG: 20 CAPSULE ORAL at 08:25

## 2017-02-26 RX ADMIN — MAGNESIUM SULFATE HEPTAHYDRATE 2 G: 40 INJECTION, SOLUTION INTRAVENOUS at 02:56

## 2017-02-26 RX ADMIN — STANDARDIZED SENNA CONCENTRATE AND DOCUSATE SODIUM 2 TABLET: 8.6; 5 TABLET, FILM COATED ORAL at 21:43

## 2017-02-26 RX ADMIN — METHYLPREDNISOLONE SODIUM SUCCINATE 125 MG: 125 INJECTION, POWDER, FOR SOLUTION INTRAMUSCULAR; INTRAVENOUS at 02:48

## 2017-02-26 RX ADMIN — IPRATROPIUM BROMIDE 0.5 MG: 0.5 SOLUTION RESPIRATORY (INHALATION) at 08:25

## 2017-02-26 RX ADMIN — ALBUTEROL SULFATE 2.5 MG: 2.5 SOLUTION RESPIRATORY (INHALATION) at 12:05

## 2017-02-26 RX ADMIN — GUAIFENESIN 1200 MG: 600 TABLET, EXTENDED RELEASE ORAL at 18:25

## 2017-02-26 RX ADMIN — Medication 2 MG: at 11:56

## 2017-02-26 RX ADMIN — HEPARIN SODIUM 5000 UNITS: 5000 INJECTION, SOLUTION INTRAVENOUS; SUBCUTANEOUS at 08:27

## 2017-02-26 RX ADMIN — AMLODIPINE BESYLATE 5 MG: 5 TABLET ORAL at 08:25

## 2017-02-26 RX ADMIN — ALBUTEROL SULFATE 2.5 MG: 2.5 SOLUTION RESPIRATORY (INHALATION) at 23:51

## 2017-02-26 RX ADMIN — IPRATROPIUM BROMIDE 0.5 MG: 0.5 SOLUTION RESPIRATORY (INHALATION) at 23:51

## 2017-02-26 RX ADMIN — HEPARIN SODIUM 5000 UNITS: 5000 INJECTION, SOLUTION INTRAVENOUS; SUBCUTANEOUS at 21:52

## 2017-02-26 RX ADMIN — GUAIFENESIN 1200 MG: 600 TABLET, EXTENDED RELEASE ORAL at 08:26

## 2017-02-26 RX ADMIN — Medication 2 MG: at 08:24

## 2017-02-26 RX ADMIN — DOXYCYCLINE 100 MG: 100 INJECTION, POWDER, LYOPHILIZED, FOR SOLUTION INTRAVENOUS at 11:45

## 2017-02-26 RX ADMIN — METHYLPREDNISOLONE SODIUM SUCCINATE 60 MG: 125 INJECTION, POWDER, FOR SOLUTION INTRAMUSCULAR; INTRAVENOUS at 11:44

## 2017-02-26 RX ADMIN — ALBUTEROL SULFATE 2.5 MG: 2.5 SOLUTION RESPIRATORY (INHALATION) at 19:36

## 2017-02-26 RX ADMIN — METHYLPREDNISOLONE SODIUM SUCCINATE 60 MG: 125 INJECTION, POWDER, FOR SOLUTION INTRAMUSCULAR; INTRAVENOUS at 23:51

## 2017-02-26 RX ADMIN — ALBUTEROL SULFATE 2.5 MG: 2.5 SOLUTION RESPIRATORY (INHALATION) at 18:25

## 2017-02-26 RX ADMIN — MONTELUKAST SODIUM 10 MG: 10 TABLET, FILM COATED ORAL at 21:43

## 2017-02-26 RX ADMIN — ALBUTEROL SULFATE 2.5 MG: 2.5 SOLUTION RESPIRATORY (INHALATION) at 08:25

## 2017-02-26 RX ADMIN — ALBUTEROL SULFATE 2.5 MG: 2.5 SOLUTION RESPIRATORY (INHALATION) at 02:30

## 2017-02-26 RX ADMIN — LISINOPRIL 20 MG: 20 TABLET ORAL at 08:25

## 2017-02-26 NOTE — ED TRIAGE NOTES
Patient A/O x 4, brought in by [employer] in respiratory distress. Patient stated to medic she's been using her albuterol inhaler with no relief. Patient received duoneb x 1, albuterol x 1. Patient denies chest pain, N/V, abdominal pain.

## 2017-02-26 NOTE — IP AVS SNAPSHOT
Current Discharge Medication List  
  
Take these medications at their scheduled times Dose & Instructions Dispensing Information Comments Morning Noon Evening Bedtime ALPRAZolam 0.5 mg tablet Commonly known as:  Eric Beecham Your next dose is: Today, Tomorrow Other:  ____________ Dose:  2.5 mg Take 5 Tabs by mouth two (2) times a day. Max Daily Amount: 5 mg. This information was told to current writer by patient. Patient fills script at [facility] on [employer]. Quantity:  40 Tab Refills:  0  
     
   
   
   
  
 amLODIPine 5 mg tablet Commonly known as:  Chopra Del Your next dose is: Today, Tomorrow Other:  ____________ Dose:  5 mg Take 1 Tab by mouth daily. Quantity:  15 Tab Refills:  0  
     
   
   
   
  
 * budesonide-formoterol 160-4.5 mcg/actuation HFA inhaler Commonly known as:  SYMBICORT Your next dose is: Today, Tomorrow Other:  ____________ Dose:  2 Puff Take 2 Puffs by inhalation two (2) times a day. Quantity:  1 Inhaler Refills:  3  
     
   
   
   
  
 * budesonide-formoterol 160-4.5 mcg/actuation HFA inhaler Commonly known as:  SYMBICORT Your next dose is: Today, Tomorrow Other:  ____________ Dose:  2 Puff Take 2 Puffs by inhalation two (2) times a day. Quantity:  1 Inhaler Refills:  1  
     
   
   
   
  
 bumetanide 0.5 mg tablet Commonly known as:  Isela Pozo Your next dose is: Today, Tomorrow Other:  ____________ Dose:  0.5 mg Take 1 Tab by mouth every fourty-eight (48) hours. Quantity:  15 Tab Refills:  0  
     
   
   
   
  
 doxycycline 100 mg capsule Commonly known as:  VIBRAMYCIN Your next dose is: Today, Tomorrow Other:  ____________ Dose:  100 mg Take 1 Cap by mouth every twelve (12) hours for 5 days. Quantity:  10 Cap Refills:  0 famotidine 20 mg tablet Commonly known as:  PEPCID Your next dose is: Today, Tomorrow Other:  ____________ Dose:  20 mg Take 1 Tab by mouth daily. Quantity:  30 Tab Refills:  0 FLUoxetine 20 mg capsule Commonly known as:  PROzac Your next dose is: Today, Tomorrow Other:  ____________ Dose:  60 mg Take 3 Caps by mouth daily. Quantity:  90 Cap Refills:  1  
     
   
   
   
  
 lisinopril 20 mg tablet Commonly known as:  Thersia Kubas Your next dose is: Today, Tomorrow Other:  ____________ Dose:  20 mg Take 1 Tab by mouth daily. Quantity:  15 Tab Refills:  2  
     
   
   
   
  
 montelukast 10 mg tablet Commonly known as:  SINGULAIR Your next dose is: Today, Tomorrow Other:  ____________ Dose:  10 mg Take 1 Tab by mouth nightly. Quantity:  30 Tab Refills:  1  
     
   
   
   
  
 senna-docusate 8.6-50 mg per tablet Commonly known as:  Luc Spikes Your next dose is: Today, Tomorrow Other:  ____________ Dose:  2 Tab Take 2 Tabs by mouth nightly. HOLD for loose stool. Quantity:  60 Tab Refills:  1  
     
   
   
   
  
 solifenacin 5 mg tablet Commonly known as:  VESIcare Your next dose is: Today, Tomorrow Other:  ____________ Dose:  5 mg Take 1 Tab by mouth daily. Quantity:  30 Tab Refills:  1  
     
   
   
   
  
 tiotropium 18 mcg inhalation capsule Commonly known as:  Yemi Dash Your next dose is: Today, Tomorrow Other:  ____________ Dose:  1 Cap Take 1 Cap by inhalation daily. Quantity:  30 Cap Refills:  1  
     
   
   
   
  
 * Notice: This list has 2 medication(s) that are the same as other medications prescribed for you. Read the directions carefully, and ask your doctor or other care provider to review them with you. Take these medications as needed Dose & Instructions Dispensing Information Comments Morning Noon Evening Bedtime  
 albuterol 90 mcg/actuation inhaler Commonly known as:  VENTOLIN HFA Your next dose is: Today, Tomorrow Other:  ____________ Dose:  2 Puff Take 2 Puffs by inhalation every six (6) hours as needed for Wheezing or Shortness of Breath. Quantity:  1 Inhaler Refills:  1  
     
   
   
   
  
 oxyCODONE-acetaminophen  mg per tablet Commonly known as:  PERCOCET 10 Your next dose is: Today, Tomorrow Other:  ____________ Dose:  1 Tab Take 1 Tab by mouth every four (4) hours as needed. Max Daily Amount: 6 Tabs. Quantity:  10 Tab Refills:  0 Take these medications as directed Dose & Instructions Dispensing Information Comments Morning Noon Evening Bedtime  
 predniSONE 20 mg tablet Commonly known as:  Corazon Greek Your next dose is: Today, Tomorrow Other:  ____________ Take 3 tabs by mouth daily for 2 days, then 2 tabs daily for 2 days, then 1 tab daily for 2 days, then STOP. Quantity:  12 Tab Refills:  0 Where to Get Your Medications Information about where to get these medications is not yet available ! Ask your nurse or doctor about these medications  
  budesonide-formoterol 160-4.5 mcg/actuation HFA inhaler  
 doxycycline 100 mg capsule  
 montelukast 10 mg tablet  
 oxyCODONE-acetaminophen  mg per tablet  
 predniSONE 20 mg tablet  
 tiotropium 18 mcg inhalation capsule

## 2017-02-26 NOTE — Clinical Note
Status[de-identified] Inpatient [101] Type of Bed: Stepdown [17] Inpatient Hospitalization Certified Necessary for the Following Reasons: 8. Other (further clarification in H&P documentation) Comment: copd, bipap Admitting Diagnosis: COPD exacerbation ([facility] [[id number]] Admitting Physician: Daniella Garza Attending Physician: Daniella Garza Estimated Length of Stay: > or = to 2 Midnights Discharge Plan[de-identified] [facility]

## 2017-02-26 NOTE — CONSULTS
Pulmonology Consult    Subjective:   Consult Note: 2/26/2017 10:17 AM    Has had multiple admissions in the past for similar reasons. On bipap cannot provide much history. The patient is a 50-year-old black  female who was just recently hospitalized at [facility]  1 month ago after presenting with similar symptoms as she has on this  visit with shortness of breath. room, she was given IV Solu-Medrol,  four nebulizer treatments, magnesium, and she had chest x-ray that  was negative. Blood gas had a pH 7.25, pCO2 of 55, pO2 of 132 on  BiPAP. She was treated with BiPAP secondary to her acute respiratory  Failure. Has had multiple intubations in the past    Past Medical History:   Diagnosis Date    Asthma     Chronic obstructive pulmonary disease ([facility]     Endocrine disease     thyroid issues    Gastrointestinal disorder     \"blockage in my stomach\"    Hypertension      No past surgical history on file. No family history on file.   Social History   Substance Use Topics    Smoking status: Never Smoker    Smokeless tobacco: Never Used    Alcohol use Yes      Comment: socially      Current Facility-Administered Medications   Medication Dose Route Frequency Provider Last Rate Last Dose    nalbuphine (NUBAIN) injection 10 mg  10 mg IntraVENous NOW Conner Vogel MD   Stopped at 02/26/17 0353    amLODIPine (NORVASC) tablet 5 mg  5 mg Oral DAILY Pierre Ortega III, MD   5 mg at 02/26/17 0825    budesonide-formoterol (SYMBICORT) 160-4.5 mcg/actuation HFA inhaler 2 Puff  2 Puff Inhalation BID Pierre Ortega III, MD        [facility]) tablet 0.5 mg  0.5 mg Oral Q48H Pierre Ortega III, MD   Stopped at 02/26/17 3696    famotidine (PEPCID) tablet 20 mg  20 mg Oral DAILY Pierre Ortega III, MD   20 mg at 02/26/17 0825    FLUoxetine (PROzac) capsule 60 mg  60 mg Oral DAILY Pierre Ortega III, MD   60 mg at 02/26/17 0825    lisinopril (PRINIVIL, ZESTRIL) tablet 20 mg  20 mg Oral DAILY Mason Li III, MD   20 mg at 02/26/17 0825    montelukast (SINGULAIR) tablet 10 mg  10 mg Oral QHS Mason Li III, MD        oxyCODONE-acetaminophen (PERCOCET 10)  mg per tablet 1 Tab  1 Tab Oral Q4H PRN Mason Li III, MD        senna-docusate (PERICOLACE) 8.6-50 mg per tablet 2 Tab  2 Tab Oral QHS Mason Li III, MD        solifenacin (VESICARE) tablet 5 mg  5 mg Oral DAILY Mason Li III, MD   5 mg at 02/26/17 0826    sodium chloride (NS) flush 5-10 mL  5-10 mL IntraVENous Q8H Mason Li III, MD        sodium chloride (NS) flush 5-10 mL  5-10 mL IntraVENous PRN Mason Li III, MD        ipratropium (ATROVENT) 0.02 % nebulizer solution 0.5 mg  0.5 mg Nebulization Q4H RT Mason Li III, MD   0.5 mg at 02/26/17 0825    methylPREDNISolone (PF) (SOLU-MEDROL) injection 60 mg  60 mg IntraVENous Q6H Mason Li III, MD   60 mg at 02/26/17 0826    morphine injection 2 mg  2 mg IntraVENous Q4H PRN Mason Li III, MD   2 mg at 02/26/17 4265    naloxone [facility]) injection 0.4 mg  0.4 mg IntraVENous PRN Mason Li III, MD        promethazine (PHENERGAN) 12.5 mg in 0.9% sodium chloride 50 mL IVPB  12.5 mg IntraVENous Q6H PRN Mason Li III, MD        heparin (porcine) injection 5,000 Units  5,000 Units SubCUTAneous Q8H Mason Li III, MD   5,000 Units at 02/26/17 0827    guaiFENesin SR (MUCINEX) tablet 1,200 mg  1,200 mg Oral BID Mason Li III, MD   1,200 mg at 02/26/17 0826    doxycycline (VIBRAMYCIN) 100 mg in 0.9% sodium chloride (MBP/ADV) 100 mL IVPB  100 mg IntraVENous Q12H Mason Li III, MD        0.9% sodium chloride infusion  100 mL/hr IntraVENous CONTINUOUS Mason Li III,  mL/hr at 02/26/17 0719 100 mL/hr at 02/26/17 0719    albuterol (PROVENTIL VENTOLIN) nebulizer solution 2.5 mg  2.5 mg Nebulization [id number] N Leungbrenda Velasquez MD         Current Outpatient Prescriptions   Medication Sig Dispense Refill    ALPRAZolam (XANAX) 0.5 mg tablet Take 5 Tabs by mouth two (2) times a day. Max Daily Amount: 5 mg. This information was told to current writer by patient. Patient fills script at [facility] on [employer]. 40 Tab 0    budesonide-formoterol (SYMBICORT) 160-4.5 mcg/actuation HFA inhaler Take 2 Puffs by inhalation two (2) times a day. 1 Inhaler 3    oxyCODONE-acetaminophen (PERCOCET 10)  mg per tablet Take 1 Tab by mouth every four (4) hours as needed. Max Daily Amount: 6 Tabs. 40 Tab 0    tiotropium (SPIRIVA) 18 mcg inhalation capsule Take 1 Cap by inhalation daily. 30 Cap 0    doxycycline (VIBRAMYCIN) 100 mg capsule Take 1 Cap by mouth every twelve (12) hours. 14 Cap 0    albuterol (VENTOLIN HFA) 90 mcg/actuation inhaler Take 2 Puffs by inhalation every six (6) hours as needed for Wheezing or Shortness of Breath. 1 Inhaler 1    albuterol sulfate (PROVENTIL;VENTOLIN) 2.5 mg/0.5 mL nebu nebulizer solution 0.5 mL by Nebulization route every four (4) hours as needed for Wheezing for up to 60 days. One nebulizer treatment every 6 hours while awake x 5 days  then every 6 hours as needed for shortness of breath and/or wheezing. 50 mL 1    bumetanide (BUMEX) 0.5 mg tablet Take 1 Tab by mouth every fourty-eight (48) hours. 15 Tab 0    lisinopril (PRINIVIL, ZESTRIL) 20 mg tablet Take 1 Tab by mouth daily. 15 Tab 2    amLODIPine (NORVASC) 5 mg tablet Take 1 Tab by mouth daily. 15 Tab 0    famotidine (PEPCID) 20 mg tablet Take 1 Tab by mouth daily. 30 Tab 0    senna-docusate (PERICOLACE) 8.6-50 mg per tablet Take 2 Tabs by mouth nightly. HOLD for loose stool. 60 Tab 1    FLUoxetine (PROZAC) 20 mg capsule Take 3 Caps by mouth daily. 90 Cap 1    montelukast (SINGULAIR) 10 mg tablet Take 1 Tab by mouth nightly. 30 Tab 2    solifenacin (VESICARE) 5 mg tablet Take 1 Tab by mouth daily. 30 Tab 1        No Known Allergies    Review of Systems:  Review of systems not obtained due to patient factors.     Objective: Blood pressure 152/80, pulse 77, resp. rate 15, weight 123.4 kg (272 lb), SpO2 100 %. No data recorded. Intake and Output:  Current Shift:    Last 3 Shifts:      Physical Exam:   Visit Vitals    /80    Pulse 77    Resp 15    Wt 123.4 kg (272 lb)    SpO2 100%    BMI 54.94 kg/m2     General:  Obese sleeping on the bipap   Head:  Normocephalic, without obvious abnormality, atraumatic. Eyes:  Conjunctivae/corneas clear. PERRL, EOMs intact. Fundi benign. Ears:  Normal TMs and external ear canals both ears. Nose: Nares normal. Septum midline. Mucosa normal. No drainage or sinus tenderness. Throat: Lips, mucosa, and tongue normal. Teeth and gums normal.   Neck: Supple, symmetrical, trachea midline, no adenopathy, thyroid: no enlargement/tenderness/nodules, no carotid bruit and no JVD. Back:   Symmetric, no curvature. ROM normal. No CVA tenderness. Lungs:   Very poor air entry b/l with wheezing. Chest wall:  No tenderness or deformity. Heart:  Regular rate and rhythm, S1, S2 normal, no murmur, click, rub or gallop. Breast Exam:  No tenderness, masses, or nipple abnormality. Abdomen:   Soft, non-tender. Bowel sounds normal. No masses,  No organomegaly. Genitalia:  Normal female without lesion, discharge or tenderness. Rectal:  Normal tone,  no masses or tenderness  Guaiac negative stool. Extremities: Extremities normal, atraumatic, no cyanosis or edema. Pulses: 2+ and symmetric all extremities. Skin: Skin color, texture, turgor normal. No rashes or lesions. Lymph nodes: Cervical, supraclavicular, and axillary nodes normal.   Neurologic: CNII-XII intact. Normal strength, sensation and reflexes throughout.          Lab/Data Review:  Reviewed all labs and radiology    Assessment:     Active Problems:    COPD exacerbation ([facility] [address] [address] (3/12/2016)      Acute respiratory failure with hypercapnia ([facility] (2/26/2017)        Plan:     Causes for poorly controlled asthma needs to be addressed as outpatient  Needs to r/o vasculitis vs ABPA and evaluate if she will qualify for xolair or nucala. Unfortunately cannot assess this when she is in acute asthma attack and on high dose steroids  Could be having VCDS+ asthma.  Poorly controlled asthma can cause changes in airways to where it will behave as copd  Continue steroids and neb rx  Increase neb rx to q 2 hrs   repeat abg now

## 2017-02-26 NOTE — PROGRESS NOTES
[facility] Hospitalist Group  Progress Note    Patient: Panfilo Smith Age: 45 y.o. : 1965 MR#: [id number] SSN: [id number]  Date/Time: 2017 6:03 PM    Subjective:     Feels about the same. No F/C, N/V, CP. Feels a bit SOB. Presently off BiPAP, satting well. Assessment/Plan:   1. Acute on chronic hypoxic and hypercapnic resp failure due to COPD exac - off BiPAP. Satting well on RA. Still quite wheezy. Maintain current med tx, steroids, abx, nebs. Uses 2lpm via NC around-the-clock. 2. MY - CPAP as outpt. CPAP @ night. 3. Polysubstance abuse - by hx.   4. Chronic diastolic CHF - possibly decompensated based on CXR. Written for IV Lasix, following clinically. Additional Notes:      Case discussed with:  [x]Patient  []Family  []Nursing  []Case Management  DVT Prophylaxis:  []Lovenox  [x]Hep SQ  []SCDs  []Coumadin   []On Heparin gtt    Objective:   VS:   Visit Vitals    /80    Pulse 77    Resp 15    Wt 123.4 kg (272 lb)    SpO2 100%    BMI 54.94 kg/m2      Tmax/24hrs: No data recorded. No intake or output data in the 24 hours ending 17 1803    General:  Awake, alert, NAD. Cardiovascular:  RRR. Pulmonary:  CTA B with diffuse exp wheezes. GI:  Soft, obese, NT/ND, NABS. Extremities:  No CT or edema.    Additional:      Labs:    Recent Results (from the past 24 hour(s))   PRO-BNP    Collection Time: 17  2:35 AM   Result Value Ref Range    NT pro- 0 - 900 PG/ML   CBC WITH AUTOMATED DIFF    Collection Time: 17  2:35 AM   Result Value Ref Range    WBC 8.0 4.6 - 13.2 K/uL    RBC 4.70 4.20 - 5.30 M/uL    HGB 13.7 12.0 - 16.0 g/dL    HCT 42.0 35.0 - 45.0 %    MCV 89.4 74.0 - 97.0 FL    MCH 29.1 24.0 - 34.0 PG    MCHC 32.6 31.0 - 37.0 g/dL    RDW 13.2 11.6 - 14.5 %    PLATELET 773 973 - 247 K/uL    MPV 10.9 9.2 - 11.8 FL    NEUTROPHILS 70 40 - 73 %    LYMPHOCYTES 22 21 - 52 %    MONOCYTES 7 3 - 10 %    EOSINOPHILS 1 0 - 5 %    BASOPHILS 0 0 - 2 %    ABS. NEUTROPHILS 5.6 1.8 - 8.0 K/UL    ABS. LYMPHOCYTES 1.7 0.9 - 3.6 K/UL    ABS. MONOCYTES 0.6 0.05 - 1.2 K/UL    ABS. EOSINOPHILS 0.1 0.0 - 0.4 K/UL    ABS.  BASOPHILS 0.0 0.0 - 0.1 K/UL    DF AUTOMATED     METABOLIC PANEL, BASIC    Collection Time: 02/26/17  2:35 AM   Result Value Ref Range    Sodium 138 136 - 145 mmol/L    Potassium 3.7 3.5 - 5.5 mmol/L    Chloride 104 100 - 108 mmol/L    CO2 31 21 - 32 mmol/L    Anion gap 3 3.0 - 18 mmol/L    Glucose 118 (H) 74 - 99 mg/dL    BUN 22 (H) 7.0 - 18 MG/DL    Creatinine 1.19 0.6 - 1.3 MG/DL    BUN/Creatinine ratio 18 12 - 20      GFR est AA 58 (L) >60 ml/min/1.73m2    GFR est non-AA 48 (L) >60 ml/min/1.73m2    Calcium 8.9 8.5 - 10.1 MG/DL   CARDIAC PANEL,(CK, CKMB & TROPONIN)    Collection Time: 02/26/17  2:35 AM   Result Value Ref Range     26 - 192 U/L    CK - MB 1.8 <3.6 ng/ml    CK-MB Index 1.2 0.0 - 4.0 %    Troponin-I, Qt. <0.02 0.0 - 0.045 NG/ML   POC LACTIC ACID    Collection Time: 02/26/17  2:38 AM   Result Value Ref Range    Lactic Acid (POC) 0.7 0.4 - 2.0 mmol/L   EKG, 12 LEAD, SUBSEQUENT    Collection Time: 02/26/17  2:39 AM   Result Value Ref Range    Ventricular Rate 94 BPM    Atrial Rate 94 BPM    P-R Interval 134 ms    QRS Duration 78 ms    Q-T Interval 368 ms    QTC Calculation (Bezet) 460 ms    Calculated P Axis 57 degrees    Calculated R Axis 44 degrees    Calculated T Axis 20 degrees    Diagnosis       Normal sinus rhythm  Possible Left atrial enlargement  Prolonged QT  Abnormal ECG  When compared with ECG of 26-JAN-2017 10:01,  Nonspecific T wave abnormality now evident in Inferior leads  Confirmed by Joanne Ingram MD, ----- ([id number]) on 2/26/2017 3:19:43 PM     POC G3    Collection Time: 02/26/17  2:52 AM   Result Value Ref Range    Device: BIPAP      FIO2 (POC) 50 %    pH (POC) 7.257 (L) 7.35 - 7.45      pCO2 (POC) 55.2 (H) 35.0 - 45.0 MMHG    pO2 (POC) 132 (H) 80 - 100 MMHG    HCO3 (POC) 24.6 22 - 26 MMOL/L    sO2 (POC) 98 (H) 92 - 97 %    Base deficit (POC) 3 mmol/L    PEEP/CPAP (POC) 5 cmH2O    PIP (POC) 14      Pressure support 9 cmH2O    Allens test (POC) YES      Total resp. rate 12      Site LEFT RADIAL      Patient temp.  98.6      Specimen type (POC) ARTERIAL      Performed by Demetris Patiño        Signed By: Laya Bai MD     February 26, 2017 6:03 PM

## 2017-02-26 NOTE — IP AVS SNAPSHOT
[address] [place] [address] Patient: Mikhail Merrill MRN: [id number] :1965 You are allergic to the following No active allergies Recent Documentation Weight  
  
  
  
  
  
 119.1 kg Emergency Contacts Name Discharge Info Relation Home Work Mobile Monica Denis  Daughter [21] [phone] Tammi Hines  Daughter [21] [phone] Brandon Rizvi [5] [phone] About your hospitalization You were admitted on:  2017 You last received care in the:  [facility] You were discharged on:  2017 Unit phone number:  [phone] Why you were hospitalized Your primary diagnosis was:  Not on File Your diagnoses also included:  Copd Exacerbation (Hcc), Acute Respiratory Failure With Hypercapnia (Hcc) Providers Seen During Your Hospitalizations Provider Role Specialty Primary office phone Roni Vu MD Attending Provider Emergency Medicine [phone] Karma Singh MD Attending Provider Internal Medicine [phone] Your Primary Care Physician (PCP) Primary Care Physician Office Phone Office Fax OTHER, PHYS ** None ** ** None ** Follow-up Information Follow up With Details Comments Contact Info Heather Santiago MD On 3/20/2017 At 2:15pm [address] [place] [address] [facility] [address] 
[phone] Woodrow Tillman NP On 3/8/2017 At 11:00am [address] [facility] [address] [facility] [address] [phone] Your Appointments 2017 11:00 AM [facility] HOSPITAL FOLLOW-UP with Woodrow Tillman NP [facility] [address] [facility] [id number] [phone] 2017  2:15 PM EDT Follow Up with Heather Santiago MD  
 [address] ([facility]) [address] [place] [address], [address] Anamika Longoria [address]  
[phone] Current Discharge Medication List  
  
START taking these medications Dose & Instructions Dispensing Information Comments Morning Noon Evening Bedtime  
 predniSONE 20 mg tablet Commonly known as:  Pio Maher Your next dose is: Today, Tomorrow Other:  _________ Take 3 tabs by mouth daily for 2 days, then 2 tabs daily for 2 days, then 1 tab daily for 2 days, then STOP. Quantity:  12 Tab Refills:  0 CONTINUE these medications which have CHANGED Dose & Instructions Dispensing Information Comments Morning Noon Evening Bedtime  
 albuterol 90 mcg/actuation inhaler Commonly known as:  VENTOLIN HFA What changed:  Another medication with the same name was removed. Continue taking this medication, and follow the directions you see here. Your next dose is: Today, Tomorrow Other:  _________ Dose:  2 Puff Take 2 Puffs by inhalation every six (6) hours as needed for Wheezing or Shortness of Breath. Quantity:  1 Inhaler Refills:  1 ALPRAZolam 0.5 mg tablet Commonly known as:  Je Collinsch What changed:   
- how much to take 
- additional instructions Your next dose is: Today, Tomorrow Other:  _________ Dose:  2.5 mg Take 5 Tabs by mouth two (2) times a day. Max Daily Amount: 5 mg. This information was told to current writer by patient. Patient fills script at [facility] on [facility]. Quantity:  40 Tab Refills:  0  
     
   
   
   
  
 * budesonide-formoterol 160-4.5 mcg/actuation HFA inhaler Commonly known as:  SYMBICORT What changed:  Another medication with the same name was added. Make sure you understand how and when to take each. Your next dose is: Today, Tomorrow Other:  _________ Dose:  2 Puff Take 2 Puffs by inhalation two (2) times a day. Quantity:  1 Inhaler Refills:  3  
     
   
   
   
  
 * budesonide-formoterol 160-4.5 mcg/actuation HFA inhaler Commonly known as:  SYMBICORT What changed: You were already taking a medication with the same name, and this prescription was added. Make sure you understand how and when to take each. Your next dose is: Today, Tomorrow Other:  _________ Dose:  2 Puff Take 2 Puffs by inhalation two (2) times a day. Quantity:  1 Inhaler Refills:  1  
     
   
   
   
  
 * Notice: This list has 2 medication(s) that are the same as other medications prescribed for you. Read the directions carefully, and ask your doctor or other care provider to review them with you. CONTINUE these medications which have NOT CHANGED Dose & Instructions Dispensing Information Comments Morning Noon Evening Bedtime  
 amLODIPine 5 mg tablet Commonly known as:  Berry Uriel Your next dose is: Today, Tomorrow Other:  _________ Dose:  5 mg Take 1 Tab by mouth daily. Quantity:  15 Tab Refills:  0  
     
   
   
   
  
 bumetanide 0.5 mg tablet Commonly known as:  David Mil Your next dose is: Today, Tomorrow Other:  _________ Dose:  0.5 mg Take 1 Tab by mouth every fourty-eight (48) hours. Quantity:  15 Tab Refills:  0  
     
   
   
   
  
 doxycycline 100 mg capsule Commonly known as:  VIBRAMYCIN Your next dose is: Today, Tomorrow Other:  _________ Dose:  100 mg Take 1 Cap by mouth every twelve (12) hours for 5 days. Quantity:  10 Cap Refills:  0  
     
   
   
   
  
 famotidine 20 mg tablet Commonly known as:  PEPCID Your next dose is: Today, Tomorrow Other:  _________ Dose:  20 mg Take 1 Tab by mouth daily. Quantity:  30 Tab Refills:  0 FLUoxetine 20 mg capsule Commonly known as:  PROzac Your next dose is: Today, Tomorrow Other:  _________ Dose:  60 mg Take 3 Caps by mouth daily. Quantity:  90 Cap Refills:  1  
     
   
   
   
  
 lisinopril 20 mg tablet Commonly known as:  Avinash Kessler Your next dose is: Today, Tomorrow Other:  _________ Dose:  20 mg Take 1 Tab by mouth daily. Quantity:  15 Tab Refills:  2  
     
   
   
   
  
 montelukast 10 mg tablet Commonly known as:  SINGULAIR Your next dose is: Today, Tomorrow Other:  _________ Dose:  10 mg Take 1 Tab by mouth nightly. Quantity:  30 Tab Refills:  1  
     
   
   
   
  
 oxyCODONE-acetaminophen  mg per tablet Commonly known as:  PERCOCET 10 Your next dose is: Today, Tomorrow Other:  _________ Dose:  1 Tab Take 1 Tab by mouth every four (4) hours as needed. Max Daily Amount: 6 Tabs. Quantity:  10 Tab Refills:  0  
     
   
   
   
  
 senna-docusate 8.6-50 mg per tablet Commonly known as:  Sayra Mao Your next dose is: Today, Tomorrow Other:  _________ Dose:  2 Tab Take 2 Tabs by mouth nightly. HOLD for loose stool. Quantity:  60 Tab Refills:  1  
     
   
   
   
  
 solifenacin 5 mg tablet Commonly known as:  VESIcare Your next dose is: Today, Tomorrow Other:  _________ Dose:  5 mg Take 1 Tab by mouth daily. Quantity:  30 Tab Refills:  1  
     
   
   
   
  
 tiotropium 18 mcg inhalation capsule Commonly known as:  Tray Dux Your next dose is: Today, Tomorrow Other:  _________ Dose:  1 Cap Take 1 Cap by inhalation daily. Quantity:  30 Cap Refills:  1 Where to Get Your Medications Information on where to get these meds will be given to you by the nurse or doctor. ! Ask your nurse or doctor about these medications budesonide-formoterol 160-4.5 mcg/actuation HFA inhaler  
 doxycycline 100 mg capsule  
 montelukast 10 mg tablet  
 oxyCODONE-acetaminophen  mg per tablet  
 predniSONE 20 mg tablet  
 tiotropium 18 mcg inhalation capsule Discharge Instructions DISCHARGE SUMMARY from Nurse The following personal items are in your possession at time of discharge: 
 
Dental Appliances: None Visual Aid: None Home Medications: None Jewelry: None Clothing: At bedside Other Valuables: None PATIENT INSTRUCTIONS: 
 
 
F-face looks uneven A-arms unable to move or move unevenly S-speech slurred or non-existent T-time-call 911 as soon as signs and symptoms begin-DO NOT go Back to bed or wait to see if you get better-TIME IS BRAIN. Warning Signs of HEART ATTACK Call 911 if you have these symptoms: 
? Chest discomfort. Most heart attacks involve discomfort in the center of the chest that lasts more than a few minutes, or that goes away and comes back. It can feel like uncomfortable pressure, squeezing, fullness, or pain. ? Discomfort in other areas of the upper body. Symptoms can include pain or discomfort in one or both arms, the back, neck, jaw, or stomach. ? Shortness of breath with or without chest discomfort. ? Other signs may include breaking out in a cold sweat, nausea, or lightheadedness. Don't wait more than five minutes to call [address]! Fast action can save your life.  Calling 911 is almost always the fastest way to get lifesaving treatment. Emergency Medical Services staff can begin treatment when they arrive  up to an hour sooner than if someone gets to the hospital by car. The discharge information has been reviewed with the patient. The patient verbalized understanding. Discharge medications reviewed with the patient and appropriate educational materials and side effects teaching were provided. MyChart Activation Thank you for requesting access to [employer]. Please follow the instructions below to securely access and download your online medical record. [employer] allows you to send messages to your doctor, view your test results, renew your prescriptions, schedule appointments, and more. How Do I Sign Up? 1. In your internet browser, go to [email or web address]. 2. Click on the First Time User? Click Here link in the Sign In box. You will see the New Member Sign Up page. 3. Enter your [employer] Access Code exactly as it appears below. You will not need to use this code after youve completed the sign-up process. If you do not sign up before the expiration date, you must request a new code. [employer] Access Code: [email or web address] Expires: 2017  3:13 AM (This is the date your [employer] access code will ) 4. Enter the last four digits of your Social Security Number (xxxx) and Date of Birth (mm/dd/yyyy) as indicated and click Submit. You will be taken to the next sign-up page. 5. Create a [employer] ID. This will be your [employer] login ID and cannot be changed, so think of one that is secure and easy to remember. 6. Create a [employer] password. You can change your password at any time. 7. Enter your Password Reset Question and Answer. This can be used at a later time if you forget your password. 8. Enter your e-mail address. You will receive e-mail notification when new information is available in [address] 9. Click Sign Up. You can now view and download portions of your medical record. 10. Click the Download Summary menu link to download a portable copy of your medical information. Additional Information If you have questions, please visit the Frequently Asked Questions section of the [employer] website at [email or web address] Remember, [employer] is NOT to be used for urgent needs. For medical emergencies, dial 911. Patient armband removed and shredded Discharge Orders None [employer] Announcement We are excited to announce that we are making your provider's discharge notes available to you in [employer]. You will see these notes when they are completed and signed by the physician that discharged you from your recent hospital stay. If you have any questions or concerns about any information you see in [employer], please call the Health Information Department where you were seen or reach out to your Primary Care Provider for more information about your plan of care. Introducing [facility]! Angelika Botello introduces [employer] patient portal. Now you can access parts of your medical record, email your doctor's office, and request medication refills online. 1. In your internet browser, go to [email or web address] 2. Click on the First Time User? Click Here link in the Sign In box. You will see the New Member Sign Up page. 3. Enter your [employer] Access Code exactly as it appears below. You will not need to use this code after youve completed the sign-up process. If you do not sign up before the expiration date, you must request a new code. · [employer] Access Code: [email or web address] Expires: 5/27/2017  3:13 AM 
 
4. Enter the last four digits of your Social Security Number (xxxx) and Date of Birth (mm/dd/yyyy) as indicated and click Submit. You will be taken to the next sign-up page. 5. Create a [employer] ID. This will be your [employer] login ID and cannot be changed, so think of one that is secure and easy to remember. 6. Create a [employer] password. You can change your password at any time. 7. Enter your Password Reset Question and Answer. This can be used at a later time if you forget your password. 8. Enter your e-mail address. You will receive e-mail notification when new information is available in [address] 9. Click Sign Up. You can now view and download portions of your medical record. 10. Click the Download Summary menu link to download a portable copy of your medical information. If you have questions, please visit the Frequently Asked Questions section of the [employer] website. Remember, [employer] is NOT to be used for urgent needs. For medical emergencies, dial 911. Now available from your iPhone and Android! General Information Please provide this summary of care documentation to your next provider. Patient Signature:  ____________________________________________________________ Date:  ____________________________________________________________  
  
Lorenzo Burks Provider Signature:  ____________________________________________________________ Date:  ____________________________________________________________

## 2017-02-26 NOTE — ED PROVIDER NOTES
HPI Comments: 2:22 AM Ryan Crystal is a 45 y.o. female with a history of COPD, hypertension, asthma, CHF who presents to ED via EMS in respiratory distress. EMS states that the patient tried at home albuterol with no relief. Her last known BP was 190/130. No known drug allergies. No other complaints, associated symptoms or modifying factors at this time. PCP: No primary care provider on file. The history is provided by the EMS personnel. Past Medical History:   Diagnosis Date    Asthma     Chronic obstructive pulmonary disease ([address]     Endocrine disease     thyroid issues    Gastrointestinal disorder     \"blockage in my stomach\"    Hypertension        No past surgical history on file. No family history on file. Social History     Social History    Marital status:      Spouse name: N/A    Number of children: N/A    Years of education: N/A     Occupational History    Not on file. Social History Main Topics    Smoking status: Never Smoker    Smokeless tobacco: Never Used    Alcohol use Yes      Comment: socially    Drug use: Yes     Special: Cocaine, Heroin    Sexual activity: No      Comment: last used 9 years ago     Other Topics Concern    Not on file     Social History Narrative         ALLERGIES: Review of patient's allergies indicates no known allergies. Review of Systems   Unable to perform ROS: Severe respiratory distress       Vitals:    02/26/17 0248 02/26/17 0256 02/26/17 0300 02/26/17 0315   BP: 161/57 161/57 169/76 163/55   Pulse: 96 91 97 97   Resp:   13 13   SpO2:   100% 100%            Physical Exam   Constitutional: She is oriented to person, place, and time. She appears well-developed and well-nourished. Patient is overweight. HENT:   Head: Normocephalic and atraumatic.    Right Ear: External ear normal.   Left Ear: External ear normal.   Nose: Nose normal.   Mouth/Throat: Oropharynx is clear and moist.   Eyes: Conjunctivae and EOM are normal. Pupils are equal, round, and reactive to light. Neck: Normal range of motion. Neck supple. Cardiovascular: Regular rhythm, normal heart sounds and intact distal pulses. Pulmonary/Chest: Effort normal. No respiratory distress. She has wheezes (2+). She has no rales. She exhibits no tenderness. Abdominal: Soft. Bowel sounds are normal. She exhibits no distension and no mass. There is no tenderness. There is no rebound and no guarding. Musculoskeletal: Normal range of motion. She exhibits no edema. No edema. Neurological: She is alert and oriented to person, place, and time. Skin: Skin is warm and dry. No rash noted. No erythema. Psychiatric: She has a normal mood and affect. Her behavior is normal. Judgment normal.   Nursing note and vitals reviewed. MDM  Number of Diagnoses or Management Options  Diagnosis management comments: 45year old female,history of COPD exacerbations with prior intubations. Presentswith dyspnea and wheezes,present 2 weeks, worse 7 hours. Will continue HHN, meds, bipap,trend labs,follow  DDx exacerbation of  COPD CHF,  Other.        Amount and/or Complexity of Data Reviewed  Clinical lab tests: ordered  Tests in the radiology section of CPT®: ordered      ED Course       Procedures    Vitals:  Patient Vitals for the past 12 hrs:   Pulse Resp BP SpO2   02/26/17 0315 97 13 163/55 100 %   02/26/17 0300 97 13 169/76 100 %   02/26/17 0256 91 - 161/57 -   02/26/17 0248 96 - 161/57 -   02/26/17 0234 96 14 - 99 %   02/26/17 0222 (!) 110 27 (!) 179/114 100 %     Medications ordered:   Medications   albuterol (PROVENTIL VENTOLIN) nebulizer solution 2.5 mg (not administered)   nalbuphine (NUBAIN) injection 10 mg (not administered)   albuterol (PROVENTIL VENTOLIN) nebulizer solution 2.5 mg (2.5 mg Nebulization Given 2/26/17 0248)   methylPREDNISolone (PF) (SOLU-MEDROL) injection 125 mg (125 mg IntraVENous Given 2/26/17 0248)   magnesium sulfate 2 g/50 ml IVPB (premix or compounded) (2 g IntraVENous New Bag 2/26/17 0256)         Lab findings:  Recent Results (from the past 12 hour(s))   PRO-BNP    Collection Time: 02/26/17  2:35 AM   Result Value Ref Range    NT pro- 0 - 900 PG/ML   CBC WITH AUTOMATED DIFF    Collection Time: 02/26/17  2:35 AM   Result Value Ref Range    WBC 8.0 4.6 - 13.2 K/uL    RBC 4.70 4.20 - 5.30 M/uL    HGB 13.7 12.0 - 16.0 g/dL    HCT 42.0 35.0 - 45.0 %    MCV 89.4 74.0 - 97.0 FL    MCH 29.1 24.0 - 34.0 PG    MCHC 32.6 31.0 - 37.0 g/dL    RDW 13.2 11.6 - 14.5 %    PLATELET 467 083 - 208 K/uL    MPV 10.9 9.2 - 11.8 FL    NEUTROPHILS 70 40 - 73 %    LYMPHOCYTES 22 21 - 52 %    MONOCYTES 7 3 - 10 %    EOSINOPHILS 1 0 - 5 %    BASOPHILS 0 0 - 2 %    ABS. NEUTROPHILS 5.6 1.8 - 8.0 K/UL    ABS. LYMPHOCYTES 1.7 0.9 - 3.6 K/UL    ABS. MONOCYTES 0.6 0.05 - 1.2 K/UL    ABS. EOSINOPHILS 0.1 0.0 - 0.4 K/UL    ABS.  BASOPHILS 0.0 0.0 - 0.1 K/UL    DF AUTOMATED     METABOLIC PANEL, BASIC    Collection Time: 02/26/17  2:35 AM   Result Value Ref Range    Sodium 138 136 - 145 mmol/L    Potassium 3.7 3.5 - 5.5 mmol/L    Chloride 104 100 - 108 mmol/L    CO2 31 21 - 32 mmol/L    Anion gap 3 3.0 - 18 mmol/L    Glucose 118 (H) 74 - 99 mg/dL    BUN 22 (H) 7.0 - 18 MG/DL    Creatinine 1.19 0.6 - 1.3 MG/DL    BUN/Creatinine ratio 18 12 - 20      GFR est AA 58 (L) >60 ml/min/1.73m2    GFR est non-AA 48 (L) >60 ml/min/1.73m2    Calcium 8.9 8.5 - 10.1 MG/DL   CARDIAC PANEL,(CK, CKMB & TROPONIN)    Collection Time: 02/26/17  2:35 AM   Result Value Ref Range     26 - 192 U/L    CK - MB 1.8 <3.6 ng/ml    CK-MB Index 1.2 0.0 - 4.0 %    Troponin-I, Qt. <0.02 0.0 - 0.045 NG/ML   POC LACTIC ACID    Collection Time: 02/26/17  2:38 AM   Result Value Ref Range    Lactic Acid (POC) 0.7 0.4 - 2.0 mmol/L   EKG, 12 LEAD, SUBSEQUENT    Collection Time: 02/26/17  2:39 AM   Result Value Ref Range    Ventricular Rate 94 BPM    Atrial Rate 94 BPM    P-R Interval 134 ms    QRS Duration 78 ms    Q-T Interval 368 ms    QTC Calculation (Bezet) 460 ms    Calculated P Axis 57 degrees    Calculated R Axis 44 degrees    Calculated T Axis 20 degrees    Diagnosis       Normal sinus rhythm  Possible Left atrial enlargement  Prolonged QT  Abnormal ECG  When compared with ECG of 26-JAN-2017 10:01,  Nonspecific T wave abnormality now evident in Inferior leads     POC G3    Collection Time: 02/26/17  2:52 AM   Result Value Ref Range    Device: BIPAP      FIO2 (POC) 50 %    pH (POC) 7.257 (L) 7.35 - 7.45      pCO2 (POC) 55.2 (H) 35.0 - 45.0 MMHG    pO2 (POC) 132 (H) 80 - 100 MMHG    HCO3 (POC) 24.6 22 - 26 MMOL/L    sO2 (POC) 98 (H) 92 - 97 %    Base deficit (POC) 3 mmol/L    PEEP/CPAP (POC) 5 cmH2O    PIP (POC) 14      Pressure support 9 cmH2O    Allens test (POC) YES      Total resp. rate 12      Site LEFT RADIAL      Patient temp. 98.6      Specimen type (POC) ARTERIAL      Performed by Segundo Prieto        EKG interpretation by ED Physician:  EKG was interpreted by me at 02:41 and showed normal sinus rhythm at a rate of 94 bpm. No STEMI. X-Ray, CT or other radiology findings or impressions:  XR CHEST PORT    (Results Pending)     XR CHEST PORT was interpreted by me at 4:02 AM and showed no acute changes. Progress notes, Consult notes or additional Procedure notes:   4:03 AM: I spoke to Dr. Ramírez Chino, hospitalist, and he will admit the patient. Disposition:  Diagnosis: No diagnosis found. Disposition: Admit to Dr. Ramírez Chino. Follow-up Information     None           Patient's Medications   Start Taking    No medications on file   Continue Taking    ALBUTEROL (VENTOLIN HFA) 90 MCG/ACTUATION INHALER    Take 2 Puffs by inhalation every six (6) hours as needed for Wheezing or Shortness of Breath. ALBUTEROL SULFATE (PROVENTIL;VENTOLIN) 2.5 MG/0.5 ML NEBU NEBULIZER SOLUTION    0.5 mL by Nebulization route every four (4) hours as needed for Wheezing for up to 60 days.  One nebulizer treatment every 6 hours while awake x 5 days  then every 6 hours as needed for shortness of breath and/or wheezing. ALPRAZOLAM (XANAX) 0.5 MG TABLET    Take 5 Tabs by mouth two (2) times a day. Max Daily Amount: 5 mg. This information was told to current writer by patient. Patient [facility] at [facility] on [employer]. AMLODIPINE (NORVASC) 5 MG TABLET    Take 1 Tab by mouth daily. BUDESONIDE-FORMOTEROL (SYMBICORT) 160-4.5 MCG/ACTUATION HFA INHALER    Take 2 Puffs by inhalation two (2) times a day. BUMETANIDE (BUMEX) 0.5 MG TABLET    Take 1 Tab by mouth every fourty-eight (48) hours. DOXYCYCLINE (VIBRAMYCIN) 100 MG CAPSULE    Take 1 Cap by mouth every twelve (12) hours. FAMOTIDINE (PEPCID) 20 MG TABLET    Take 1 Tab by mouth daily. FLUOXETINE (PROZAC) 20 MG CAPSULE    Take 3 Caps by mouth daily. LISINOPRIL (PRINIVIL, ZESTRIL) 20 MG TABLET    Take 1 Tab by mouth daily. MONTELUKAST (SINGULAIR) 10 MG TABLET    Take 1 Tab by mouth nightly. OXYCODONE-ACETAMINOPHEN (PERCOCET 10)  MG PER TABLET    Take 1 Tab by mouth every four (4) hours as needed. Max Daily Amount: 6 Tabs. SENNA-DOCUSATE (PERICOLACE) 8.6-50 MG PER TABLET    Take 2 Tabs by mouth nightly. HOLD for loose stool. SOLIFENACIN (VESICARE) 5 MG TABLET    Take 1 Tab by mouth daily. TIOTROPIUM (SPIRIVA) 18 MCG INHALATION CAPSULE    Take 1 Cap by inhalation daily. These Medications have changed    No medications on file   Stop Taking    No medications on file     Scribe [address] [place] [address] for and in the presence of Pedrito Fletcher MD 2:23 AM, 02/26/17. Signed by: Cherie Reagan, 2:23 AM, 02/26/17. Provider Attestation:   I personally performed the services described in the documentation, reviewed the documentation, as recorded by the scribe in my presence, and it accurately and completely records my words and actions.  February 26, 2017 at 7:10 PM - Mani Streeter MD

## 2017-02-26 NOTE — ED NOTES
Patient sleeping comfortable on stretcher, continues to be on bipap machine at this time. No increased work of breathing noted. Denies any complaints at this time. Will continue to monitor patient.

## 2017-02-27 LAB
ANION GAP BLD CALC-SCNC: 10 MMOL/L (ref 3–18)
BASOPHILS # BLD AUTO: 0 K/UL (ref 0–0.1)
BASOPHILS # BLD: 0 % (ref 0–2)
BUN SERPL-MCNC: 19 MG/DL (ref 7–18)
BUN/CREAT SERPL: 18 (ref 12–20)
CALCIUM SERPL-MCNC: 8.7 MG/DL (ref 8.5–10.1)
CHLORIDE SERPL-SCNC: 107 MMOL/L (ref 100–108)
CO2 SERPL-SCNC: 24 MMOL/L (ref 21–32)
CREAT SERPL-MCNC: 1.06 MG/DL (ref 0.6–1.3)
DIFFERENTIAL METHOD BLD: ABNORMAL
EOSINOPHIL # BLD: 0 K/UL (ref 0–0.4)
EOSINOPHIL NFR BLD: 0 % (ref 0–5)
ERYTHROCYTE [DISTWIDTH] IN BLOOD BY AUTOMATED COUNT: 13.2 % (ref 11.6–14.5)
GLUCOSE SERPL-MCNC: 164 MG/DL (ref 74–99)
HCT VFR BLD AUTO: 36.9 % (ref 35–45)
HGB BLD-MCNC: 12.1 G/DL (ref 12–16)
LYMPHOCYTES # BLD AUTO: 12 % (ref 21–52)
LYMPHOCYTES # BLD: 0.7 K/UL (ref 0.9–3.6)
MCH RBC QN AUTO: 28.7 PG (ref 24–34)
MCHC RBC AUTO-ENTMCNC: 32.8 G/DL (ref 31–37)
MCV RBC AUTO: 87.4 FL (ref 74–97)
MONOCYTES # BLD: 0.1 K/UL (ref 0.05–1.2)
MONOCYTES NFR BLD AUTO: 2 % (ref 3–10)
NEUTS SEG # BLD: 5.1 K/UL (ref 1.8–8)
NEUTS SEG NFR BLD AUTO: 86 % (ref 40–73)
PLATELET # BLD AUTO: 187 K/UL (ref 135–420)
PMV BLD AUTO: 11.3 FL (ref 9.2–11.8)
POTASSIUM SERPL-SCNC: 3.9 MMOL/L (ref 3.5–5.5)
RBC # BLD AUTO: 4.22 M/UL (ref 4.2–5.3)
SODIUM SERPL-SCNC: 141 MMOL/L (ref 136–145)
WBC # BLD AUTO: 5.9 K/UL (ref 4.6–13.2)

## 2017-02-27 PROCEDURE — 80048 BASIC METABOLIC PNL TOTAL CA: CPT | Performed by: FAMILY MEDICINE

## 2017-02-27 PROCEDURE — 74011000250 HC RX REV CODE- 250: Performed by: HOSPITALIST

## 2017-02-27 PROCEDURE — 74011250637 HC RX REV CODE- 250/637: Performed by: EMERGENCY MEDICINE

## 2017-02-27 PROCEDURE — 74011250636 HC RX REV CODE- 250/636: Performed by: FAMILY MEDICINE

## 2017-02-27 PROCEDURE — 74011000250 HC RX REV CODE- 250: Performed by: INTERNAL MEDICINE

## 2017-02-27 PROCEDURE — 74011250636 HC RX REV CODE- 250/636: Performed by: HOSPITALIST

## 2017-02-27 PROCEDURE — 85025 COMPLETE CBC W/AUTO DIFF WBC: CPT | Performed by: FAMILY MEDICINE

## 2017-02-27 PROCEDURE — 74011250637 HC RX REV CODE- 250/637: Performed by: HOSPITALIST

## 2017-02-27 PROCEDURE — 74011000258 HC RX REV CODE- 258: Performed by: HOSPITALIST

## 2017-02-27 PROCEDURE — 74011250637 HC RX REV CODE- 250/637: Performed by: FAMILY MEDICINE

## 2017-02-27 RX ORDER — GUAIFENESIN 600 MG/1
600 TABLET, EXTENDED RELEASE ORAL 2 TIMES DAILY
Status: DISCONTINUED | OUTPATIENT
Start: 2017-02-27 | End: 2017-02-28 | Stop reason: HOSPADM

## 2017-02-27 RX ORDER — AMLODIPINE BESYLATE 5 MG/1
5 TABLET ORAL
Status: COMPLETED | OUTPATIENT
Start: 2017-02-27 | End: 2017-02-27

## 2017-02-27 RX ORDER — AMLODIPINE BESYLATE 10 MG/1
10 TABLET ORAL DAILY
Status: DISCONTINUED | OUTPATIENT
Start: 2017-02-28 | End: 2017-02-28 | Stop reason: HOSPADM

## 2017-02-27 RX ADMIN — LISINOPRIL 20 MG: 20 TABLET ORAL at 08:09

## 2017-02-27 RX ADMIN — FAMOTIDINE 20 MG: 20 TABLET ORAL at 08:09

## 2017-02-27 RX ADMIN — METHYLPREDNISOLONE SODIUM SUCCINATE 60 MG: 125 INJECTION, POWDER, FOR SOLUTION INTRAMUSCULAR; INTRAVENOUS at 12:44

## 2017-02-27 RX ADMIN — FLUOXETINE 60 MG: 20 CAPSULE ORAL at 08:09

## 2017-02-27 RX ADMIN — Medication 2 MG: at 00:02

## 2017-02-27 RX ADMIN — GUAIFENESIN 1200 MG: 600 TABLET, EXTENDED RELEASE ORAL at 19:38

## 2017-02-27 RX ADMIN — HEPARIN SODIUM 5000 UNITS: 5000 INJECTION, SOLUTION INTRAVENOUS; SUBCUTANEOUS at 23:04

## 2017-02-27 RX ADMIN — IPRATROPIUM BROMIDE 0.5 MG: 0.5 SOLUTION RESPIRATORY (INHALATION) at 08:08

## 2017-02-27 RX ADMIN — OXYCODONE HYDROCHLORIDE AND ACETAMINOPHEN 1 TABLET: 10; 325 TABLET ORAL at 17:00

## 2017-02-27 RX ADMIN — ALBUTEROL SULFATE 2.5 MG: 2.5 SOLUTION RESPIRATORY (INHALATION) at 03:48

## 2017-02-27 RX ADMIN — HEPARIN SODIUM 5000 UNITS: 5000 INJECTION, SOLUTION INTRAVENOUS; SUBCUTANEOUS at 06:13

## 2017-02-27 RX ADMIN — SOLIFENACIN SUCCINATE 5 MG: 5 TABLET, FILM COATED ORAL at 08:09

## 2017-02-27 RX ADMIN — IPRATROPIUM BROMIDE 0.5 MG: 0.5 SOLUTION RESPIRATORY (INHALATION) at 12:42

## 2017-02-27 RX ADMIN — HEPARIN SODIUM 5000 UNITS: 5000 INJECTION, SOLUTION INTRAVENOUS; SUBCUTANEOUS at 14:16

## 2017-02-27 RX ADMIN — ALBUTEROL SULFATE 2.5 MG: 2.5 SOLUTION RESPIRATORY (INHALATION) at 23:04

## 2017-02-27 RX ADMIN — MONTELUKAST SODIUM 10 MG: 10 TABLET, FILM COATED ORAL at 23:04

## 2017-02-27 RX ADMIN — Medication 10 ML: at 06:00

## 2017-02-27 RX ADMIN — RANITIDINE 600 MG: 75 TABLET, FILM COATED ORAL at 23:08

## 2017-02-27 RX ADMIN — DOXYCYCLINE 100 MG: 100 INJECTION, POWDER, LYOPHILIZED, FOR SOLUTION INTRAVENOUS at 23:05

## 2017-02-27 RX ADMIN — ALBUTEROL SULFATE 2.5 MG: 2.5 SOLUTION RESPIRATORY (INHALATION) at 16:30

## 2017-02-27 RX ADMIN — OXYCODONE HYDROCHLORIDE AND ACETAMINOPHEN 1 TABLET: 10; 325 TABLET ORAL at 08:10

## 2017-02-27 RX ADMIN — ALBUTEROL SULFATE 2.5 MG: 2.5 SOLUTION RESPIRATORY (INHALATION) at 08:08

## 2017-02-27 RX ADMIN — Medication 2 MG: at 06:12

## 2017-02-27 RX ADMIN — IPRATROPIUM BROMIDE 0.5 MG: 0.5 SOLUTION RESPIRATORY (INHALATION) at 03:48

## 2017-02-27 RX ADMIN — Medication 2 MG: at 09:52

## 2017-02-27 RX ADMIN — FUROSEMIDE 40 MG: 10 INJECTION, SOLUTION INTRAVENOUS at 23:03

## 2017-02-27 RX ADMIN — ALBUTEROL SULFATE 2.5 MG: 2.5 SOLUTION RESPIRATORY (INHALATION) at 12:42

## 2017-02-27 RX ADMIN — AMLODIPINE BESYLATE 5 MG: 5 TABLET ORAL at 10:04

## 2017-02-27 RX ADMIN — METHYLPREDNISOLONE SODIUM SUCCINATE 60 MG: 125 INJECTION, POWDER, FOR SOLUTION INTRAMUSCULAR; INTRAVENOUS at 06:13

## 2017-02-27 RX ADMIN — ALBUTEROL SULFATE 2.5 MG: 2.5 SOLUTION RESPIRATORY (INHALATION) at 06:13

## 2017-02-27 RX ADMIN — OXYCODONE HYDROCHLORIDE AND ACETAMINOPHEN 1 TABLET: 10; 325 TABLET ORAL at 23:04

## 2017-02-27 RX ADMIN — GUAIFENESIN 1200 MG: 600 TABLET, EXTENDED RELEASE ORAL at 08:09

## 2017-02-27 RX ADMIN — IPRATROPIUM BROMIDE 0.5 MG: 0.5 SOLUTION RESPIRATORY (INHALATION) at 16:29

## 2017-02-27 RX ADMIN — ALBUTEROL SULFATE 2.5 MG: 2.5 SOLUTION RESPIRATORY (INHALATION) at 09:51

## 2017-02-27 RX ADMIN — ALBUTEROL SULFATE 2.5 MG: 2.5 SOLUTION RESPIRATORY (INHALATION) at 01:49

## 2017-02-27 RX ADMIN — DOXYCYCLINE 100 MG: 100 INJECTION, POWDER, LYOPHILIZED, FOR SOLUTION INTRAVENOUS at 08:09

## 2017-02-27 RX ADMIN — IPRATROPIUM BROMIDE 0.5 MG: 0.5 SOLUTION RESPIRATORY (INHALATION) at 19:42

## 2017-02-27 RX ADMIN — STANDARDIZED SENNA CONCENTRATE AND DOCUSATE SODIUM 2 TABLET: 8.6; 5 TABLET, FILM COATED ORAL at 23:03

## 2017-02-27 RX ADMIN — FUROSEMIDE 40 MG: 10 INJECTION, SOLUTION INTRAVENOUS at 08:09

## 2017-02-27 RX ADMIN — AMLODIPINE BESYLATE 5 MG: 5 TABLET ORAL at 08:09

## 2017-02-27 RX ADMIN — ALBUTEROL SULFATE 2.5 MG: 2.5 SOLUTION RESPIRATORY (INHALATION) at 19:50

## 2017-02-27 RX ADMIN — OXYCODONE HYDROCHLORIDE AND ACETAMINOPHEN 1 TABLET: 10; 325 TABLET ORAL at 13:14

## 2017-02-27 RX ADMIN — Medication 2 MG: at 19:49

## 2017-02-27 RX ADMIN — METHYLPREDNISOLONE SODIUM SUCCINATE 60 MG: 125 INJECTION, POWDER, FOR SOLUTION INTRAMUSCULAR; INTRAVENOUS at 19:39

## 2017-02-27 NOTE — ED NOTES
Nursing report received from Jillian Ryan, [facility]. Patient A/O x4, resting comfortably on the stretcher at this time. Vital signs are stable, no signs of distress noted. Will continue to monitor.

## 2017-02-27 NOTE — ED NOTES
Patient provided healthy choice meal per request. Patient refusing to keep blood pressure cuff on at this time because it hurts when it blows up.

## 2017-02-27 NOTE — PROGRESS NOTES
Pulmonology Progress Note    Subjective:     Jessica Georges is currently off bipap  Says she wants to go back on it and was not on it last night    Current Facility-Administered Medications   Medication Dose Route Frequency    [START ON 2/28/2017] amLODIPine (NORVASC) tablet 10 mg  10 mg Oral DAILY    budesonide-formoterol (SYMBICORT) 160-4.5 mcg/actuation HFA inhaler 2 Puff  2 Puff Inhalation BID    famotidine (PEPCID) tablet 20 mg  20 mg Oral DAILY    FLUoxetine (PROzac) capsule 60 mg  60 mg Oral DAILY    lisinopril (PRINIVIL, ZESTRIL) tablet 20 mg  20 mg Oral DAILY    montelukast (SINGULAIR) tablet 10 mg  10 mg Oral QHS    oxyCODONE-acetaminophen (PERCOCET 10)  mg per tablet 1 Tab  1 Tab Oral Q4H PRN    senna-docusate (PERICOLACE) 8.6-50 mg per tablet 2 Tab  2 Tab Oral QHS    solifenacin (VESICARE) tablet 5 mg  5 mg Oral DAILY    sodium chloride (NS) flush 5-10 mL  5-10 mL IntraVENous Q8H    sodium chloride (NS) flush 5-10 mL  5-10 mL IntraVENous PRN    ipratropium (ATROVENT) 0.02 % nebulizer solution 0.5 mg  0.5 mg Nebulization Q4H RT    methylPREDNISolone (PF) (SOLU-MEDROL) injection 60 mg  60 mg IntraVENous Q6H    morphine injection 2 mg  2 mg IntraVENous Q4H PRN    naloxone (NARCAN) injection 0.4 mg  0.4 mg IntraVENous PRN    promethazine (PHENERGAN) 12.5 mg in 0.9% sodium chloride 50 mL IVPB  12.5 mg IntraVENous Q6H PRN    heparin (porcine) injection 5,000 Units  5,000 Units SubCUTAneous Q8H    guaiFENesin ER (MUCINEX) tablet 1,200 mg  1,200 mg Oral BID    doxycycline (VIBRAMYCIN) 100 mg in 0.9% sodium chloride (MBP/ADV) 100 mL IVPB  100 mg IntraVENous Q12H    albuterol (PROVENTIL VENTOLIN) nebulizer solution 2.5 mg  2.5 mg Nebulization Q2H    furosemide (LASIX) injection 40 mg  40 mg IntraVENous Q12H     Current Outpatient Prescriptions   Medication Sig    ALPRAZolam (XANAX) 0.5 mg tablet Take 5 Tabs by mouth two (2) times a day. Max Daily Amount: 5 mg.  This information was told to current writer by patient. Patient fills script at [facility] on [facility].  budesonide-formoterol (SYMBICORT) 160-4.5 mcg/actuation HFA inhaler Take 2 Puffs by inhalation two (2) times a day.  oxyCODONE-acetaminophen (PERCOCET 10)  mg per tablet Take 1 Tab by mouth every four (4) hours as needed. Max Daily Amount: 6 Tabs.  tiotropium (SPIRIVA) 18 mcg inhalation capsule Take 1 Cap by inhalation daily.  doxycycline (VIBRAMYCIN) 100 mg capsule Take 1 Cap by mouth every twelve (12) hours.  albuterol (VENTOLIN HFA) 90 mcg/actuation inhaler Take 2 Puffs by inhalation every six (6) hours as needed for Wheezing or Shortness of Breath.  albuterol sulfate (PROVENTIL;VENTOLIN) 2.5 mg/0.5 mL nebu nebulizer solution 0.5 mL by Nebulization route every four (4) hours as needed for Wheezing for up to 60 days. One nebulizer treatment every 6 hours while awake x 5 days  then every 6 hours as needed for shortness of breath and/or wheezing.  bumetanide (BUMEX) 0.5 mg tablet Take 1 Tab by mouth every fourty-eight (48) hours.  lisinopril (PRINIVIL, ZESTRIL) 20 mg tablet Take 1 Tab by mouth daily.  amLODIPine (NORVASC) 5 mg tablet Take 1 Tab by mouth daily.  famotidine (PEPCID) 20 mg tablet Take 1 Tab by mouth daily.  senna-docusate (PERICOLACE) 8.6-50 mg per tablet Take 2 Tabs by mouth nightly. HOLD for loose stool.  FLUoxetine (PROZAC) 20 mg capsule Take 3 Caps by mouth daily.  montelukast (SINGULAIR) 10 mg tablet Take 1 Tab by mouth nightly.  solifenacin (VESICARE) 5 mg tablet Take 1 Tab by mouth daily. Review of Systems:  Pertinent items are noted in HPI.     Objective:     Visit Vitals    /66    Pulse 85    Resp 20    Wt 123.4 kg (272 lb)    SpO2 97%    BMI 54.94 kg/m2      O2 Device: BIPAP            Physical Exam:   Visit Vitals    /66    Pulse 85    Resp 20    Wt 123.4 kg (272 lb)    SpO2 97%    BMI 54.94 kg/m2     General:  Alert, cooperative, no distress, appears stated age, obese   Head:  Normocephalic, without obvious abnormality, atraumatic. Eyes:  Conjunctivae/corneas clear. PERRL, EOMs intact. Fundi benign. Ears:  Normal TMs and external ear canals both ears. Nose: Nares normal. Septum midline. Mucosa normal. No drainage or sinus tenderness. Throat: Lips, mucosa, and tongue normal. Teeth and gums normal.   Neck: Supple, symmetrical, trachea midline, no adenopathy, thyroid: no enlargement/tenderness/nodules, no carotid bruit and no JVD. Back:   Symmetric, no curvature. ROM normal. No CVA tenderness. Lungs:   Wheezing appears to be at vocal cord level   Chest wall:  No tenderness or deformity. Heart:  Regular rate and rhythm, S1, S2 normal, no murmur, click, rub or gallop. Breast Exam:  No tenderness, masses, or nipple abnormality. Abdomen:   Soft, non-tender. Bowel sounds normal. No masses,  No organomegaly. Genitalia:  Normal female without lesion, discharge or tenderness. Rectal:  Normal tone,  no masses or tenderness  Guaiac negative stool. Extremities: Extremities normal, atraumatic, no cyanosis or edema. Pulses: 2+ and symmetric all extremities. Skin: Skin color, texture, turgor normal. No rashes or lesions. Lymph nodes: Cervical, supraclavicular, and axillary nodes normal.   Neurologic: CNII-XII intact. Normal strength, sensation and reflexes throughout.        Data Review:    Recent Results (from the past 24 hour(s))   CBC WITH AUTOMATED DIFF    Collection Time: 02/27/17  3:39 AM   Result Value Ref Range    WBC 5.9 4.6 - 13.2 K/uL    RBC 4.22 4.20 - 5.30 M/uL    HGB 12.1 12.0 - 16.0 g/dL    HCT 36.9 35.0 - 45.0 %    MCV 87.4 74.0 - 97.0 FL    MCH 28.7 24.0 - 34.0 PG    MCHC 32.8 31.0 - 37.0 g/dL    RDW 13.2 11.6 - 14.5 %    PLATELET 808 493 - 083 K/uL    MPV 11.3 9.2 - 11.8 FL    NEUTROPHILS 86 (H) 40 - 73 %    LYMPHOCYTES 12 (L) 21 - 52 %    MONOCYTES 2 (L) 3 - 10 %    EOSINOPHILS 0 0 - 5 %    BASOPHILS 0 0 - 2 % ABS. NEUTROPHILS 5.1 1.8 - 8.0 K/UL    ABS. LYMPHOCYTES 0.7 (L) 0.9 - 3.6 K/UL    ABS. MONOCYTES 0.1 0.05 - 1.2 K/UL    ABS. EOSINOPHILS 0.0 0.0 - 0.4 K/UL    ABS. BASOPHILS 0.0 0.0 - 0.1 K/UL    DF AUTOMATED     METABOLIC PANEL, BASIC    Collection Time: 02/27/17  3:39 AM   Result Value Ref Range    Sodium 141 136 - 145 mmol/L    Potassium 3.9 3.5 - 5.5 mmol/L    Chloride 107 100 - 108 mmol/L    CO2 24 21 - 32 mmol/L    Anion gap 10 3.0 - 18 mmol/L    Glucose 164 (H) 74 - 99 mg/dL    BUN 19 (H) 7.0 - 18 MG/DL    Creatinine 1.06 0.6 - 1.3 MG/DL    BUN/Creatinine ratio 18 12 - 20      GFR est AA >60 >60 ml/min/1.73m2    GFR est non-AA 54 (L) >60 ml/min/1.73m2    Calcium 8.7 8.5 - 10.1 MG/DL       Assessment:     Active Problems:    COPD exacerbation (HCC) (3/12/2016)      Acute respiratory failure with hypercapnia (HCC) (2/26/2017)        Plan:     Asthma+VCDS?  Vs VCDS alone  Continue neb rx and steroids  Suspect non compliance to meds as outpatient and does not go for her outpt appointments  bipap prn daytime and continuous at night

## 2017-02-27 NOTE — ED NOTES
Patient complaining of 10/10 pain at this time and requesting pain medication. Will administer PRN morphine.

## 2017-02-27 NOTE — PROGRESS NOTES
[facility]  [facility]    Patient: Daniela Aguirre Age: 45 y.o. : 1965 MR#: [id number] SSN: [id number]  Date/Time: 2017 6:03 PM    Subjective:     Feels better, breathing better. Still wheezing. Is supposed to be on 2lpm via NC around-the-clock, but pt has NC off. Denies fevers, reports chills. No N/V, CP. Still a bit SOB. Assessment/Plan:   1. Acute on chronic hypoxic and hypercapnic resp failure due to COPD exac - off BiPAP. Satting well on RA today in high 90s. Still wheezing, moving more air today. Maintain current med tx, steroids, abx, nebs. Uses 2lpm via NC around-the-clock. 2. MY - CPAP as outpt. CPAP @ night. 3. Polysubstance abuse - by hx.   4. Chronic diastolic CHF - possibly decompensated based on CXR. Written for IV Lasix, following clinically. 5. Repeat CXR in AM. With improvement, d/c Lasix, resume outpt Bumex dose. 6. HTN - will increase Norvasc dose. Additional Notes:      Case discussed with:  [x]Patient  []Family  []Nursing  []Case Management  DVT Prophylaxis:  []Lovenox  [x]Hep SQ  []SCDs  []Coumadin   []On Heparin gtt    Objective:   VS:   Visit Vitals    BP (!) 190/105    Pulse 94    Resp 20    Wt 123.4 kg (272 lb)    SpO2 97%    BMI 54.94 kg/m2      Tmax/24hrs: No data recorded. No intake or output data in the 24 hours ending 17 0936    General:  Awake, alert, NAD. Cardiovascular:  RRR. Pulmonary:  CTA B with diffuse exp wheezes. Increased air exchange compared to yesterday. GI:  Soft, obese, NT/ND, NABS. Extremities:  No CT or edema.    Additional:      Labs:    Recent Results (from the past 24 hour(s))   CBC WITH AUTOMATED DIFF    Collection Time: 17  3:39 AM   Result Value Ref Range    WBC 5.9 4.6 - 13.2 K/uL    RBC 4.22 4.20 - 5.30 M/uL    HGB 12.1 12.0 - 16.0 g/dL    HCT 36.9 35.0 - 45.0 %    MCV 87.4 74.0 - 97.0 FL    MCH 28.7 24.0 - 34.0 PG    MCHC 32.8 31.0 - 37.0 g/dL    RDW 13.2 11.6 - 14.5 %    PLATELET 357 040 - 040 K/uL    MPV 11.3 9.2 - 11.8 FL    NEUTROPHILS 86 (H) 40 - 73 %    LYMPHOCYTES 12 (L) 21 - 52 %    MONOCYTES 2 (L) 3 - 10 %    EOSINOPHILS 0 0 - 5 %    BASOPHILS 0 0 - 2 %    ABS. NEUTROPHILS 5.1 1.8 - 8.0 K/UL    ABS. LYMPHOCYTES 0.7 (L) 0.9 - 3.6 K/UL    ABS. MONOCYTES 0.1 0.05 - 1.2 K/UL    ABS. EOSINOPHILS 0.0 0.0 - 0.4 K/UL    ABS.  BASOPHILS 0.0 0.0 - 0.1 K/UL    DF AUTOMATED     METABOLIC PANEL, BASIC    Collection Time: 02/27/17  3:39 AM   Result Value Ref Range    Sodium 141 136 - 145 mmol/L    Potassium 3.9 3.5 - 5.5 mmol/L    Chloride 107 100 - 108 mmol/L    CO2 24 21 - 32 mmol/L    Anion gap 10 3.0 - 18 mmol/L    Glucose 164 (H) 74 - 99 mg/dL    BUN 19 (H) 7.0 - 18 MG/DL    Creatinine 1.06 0.6 - 1.3 MG/DL    BUN/Creatinine ratio 18 12 - 20      GFR est AA >60 >60 ml/min/1.73m2    GFR est non-AA 54 (L) >60 ml/min/1.73m2    Calcium 8.7 8.5 - 10.1 MG/DL       Signed By: Karen Sanchez MD     February 27, 2017 6:03 PM

## 2017-02-27 NOTE — ED NOTES
Bedside and Verbal shift change report given to Salma Estrada RN (oncoming nurse) by Andrea Lazo RN (offgoing nurse). Report included the following information SBAR, ED Summary and MAR.

## 2017-02-27 NOTE — ED NOTES
Assuming care of patient at this time. Discussed plan of care at this time. Questions encouraged and addressed. Verbalized understanding. Call light within reach.

## 2017-02-27 NOTE — ED NOTES
Patient asleep on the stretcher, no signs of distress noted. Vital signs stable, will continue to monitor.

## 2017-02-28 ENCOUNTER — APPOINTMENT (OUTPATIENT)
Dept: GENERAL RADIOLOGY | Age: 52
DRG: 133 | End: 2017-02-28
Attending: FAMILY MEDICINE
Payer: MEDICAID

## 2017-02-28 VITALS
RESPIRATION RATE: 19 BRPM | BODY MASS INDEX: 53.02 KG/M2 | HEART RATE: 73 BPM | WEIGHT: 262.5 LBS | OXYGEN SATURATION: 97 % | TEMPERATURE: 97.7 F | DIASTOLIC BLOOD PRESSURE: 81 MMHG | SYSTOLIC BLOOD PRESSURE: 129 MMHG

## 2017-02-28 LAB
ANION GAP BLD CALC-SCNC: 9 MMOL/L (ref 3–18)
BASOPHILS # BLD AUTO: 0 K/UL (ref 0–0.1)
BASOPHILS # BLD: 0 % (ref 0–2)
BUN SERPL-MCNC: 17 MG/DL (ref 7–18)
BUN/CREAT SERPL: 18 (ref 12–20)
CALCIUM SERPL-MCNC: 9.1 MG/DL (ref 8.5–10.1)
CHLORIDE SERPL-SCNC: 101 MMOL/L (ref 100–108)
CO2 SERPL-SCNC: 31 MMOL/L (ref 21–32)
CREAT SERPL-MCNC: 0.94 MG/DL (ref 0.6–1.3)
DIFFERENTIAL METHOD BLD: ABNORMAL
EOSINOPHIL # BLD: 0 K/UL (ref 0–0.4)
EOSINOPHIL NFR BLD: 0 % (ref 0–5)
ERYTHROCYTE [DISTWIDTH] IN BLOOD BY AUTOMATED COUNT: 13.8 % (ref 11.6–14.5)
GLUCOSE BLD STRIP.AUTO-MCNC: 113 MG/DL (ref 70–110)
GLUCOSE BLD STRIP.AUTO-MCNC: 120 MG/DL (ref 70–110)
GLUCOSE BLD STRIP.AUTO-MCNC: 131 MG/DL (ref 70–110)
GLUCOSE SERPL-MCNC: 133 MG/DL (ref 74–99)
HCT VFR BLD AUTO: 40.4 % (ref 35–45)
HGB BLD-MCNC: 13.1 G/DL (ref 12–16)
LYMPHOCYTES # BLD AUTO: 9 % (ref 21–52)
LYMPHOCYTES # BLD: 0.9 K/UL (ref 0.9–3.6)
MCH RBC QN AUTO: 28.6 PG (ref 24–34)
MCHC RBC AUTO-ENTMCNC: 32.4 G/DL (ref 31–37)
MCV RBC AUTO: 88.2 FL (ref 74–97)
MONOCYTES # BLD: 0.4 K/UL (ref 0.05–1.2)
MONOCYTES NFR BLD AUTO: 5 % (ref 3–10)
NEUTS SEG # BLD: 8.2 K/UL (ref 1.8–8)
NEUTS SEG NFR BLD AUTO: 86 % (ref 40–73)
PLATELET # BLD AUTO: 225 K/UL (ref 135–420)
PMV BLD AUTO: 11.3 FL (ref 9.2–11.8)
POTASSIUM SERPL-SCNC: 3.5 MMOL/L (ref 3.5–5.5)
RBC # BLD AUTO: 4.58 M/UL (ref 4.2–5.3)
SODIUM SERPL-SCNC: 141 MMOL/L (ref 136–145)
WBC # BLD AUTO: 9.5 K/UL (ref 4.6–13.2)

## 2017-02-28 PROCEDURE — 85025 COMPLETE CBC W/AUTO DIFF WBC: CPT | Performed by: FAMILY MEDICINE

## 2017-02-28 PROCEDURE — 74011250637 HC RX REV CODE- 250/637: Performed by: FAMILY MEDICINE

## 2017-02-28 PROCEDURE — 82962 GLUCOSE BLOOD TEST: CPT

## 2017-02-28 PROCEDURE — 94640 AIRWAY INHALATION TREATMENT: CPT

## 2017-02-28 PROCEDURE — 36415 COLL VENOUS BLD VENIPUNCTURE: CPT | Performed by: FAMILY MEDICINE

## 2017-02-28 PROCEDURE — 74011250636 HC RX REV CODE- 250/636: Performed by: FAMILY MEDICINE

## 2017-02-28 PROCEDURE — 71010 XR CHEST PORT: CPT

## 2017-02-28 PROCEDURE — 74011250637 HC RX REV CODE- 250/637: Performed by: HOSPITALIST

## 2017-02-28 PROCEDURE — 65660000000 HC RM CCU STEPDOWN

## 2017-02-28 PROCEDURE — 80048 BASIC METABOLIC PNL TOTAL CA: CPT | Performed by: FAMILY MEDICINE

## 2017-02-28 PROCEDURE — 74011636637 HC RX REV CODE- 636/637: Performed by: INTERNAL MEDICINE

## 2017-02-28 PROCEDURE — 74011000258 HC RX REV CODE- 258: Performed by: HOSPITALIST

## 2017-02-28 PROCEDURE — 74011000250 HC RX REV CODE- 250: Performed by: HOSPITALIST

## 2017-02-28 PROCEDURE — 77010033678 HC OXYGEN DAILY

## 2017-02-28 PROCEDURE — 74011250636 HC RX REV CODE- 250/636: Performed by: HOSPITALIST

## 2017-02-28 RX ORDER — BUDESONIDE AND FORMOTEROL FUMARATE DIHYDRATE 160; 4.5 UG/1; UG/1
2 AEROSOL RESPIRATORY (INHALATION) 2 TIMES DAILY
Qty: 1 INHALER | Refills: 1 | Status: SHIPPED | OUTPATIENT
Start: 2017-02-28 | End: 2017-03-09

## 2017-02-28 RX ORDER — OXYCODONE AND ACETAMINOPHEN 10; 325 MG/1; MG/1
1 TABLET ORAL
Qty: 10 TAB | Refills: 0 | Status: SHIPPED | OUTPATIENT
Start: 2017-02-28 | End: 2017-03-09

## 2017-02-28 RX ORDER — IPRATROPIUM BROMIDE AND ALBUTEROL SULFATE 2.5; .5 MG/3ML; MG/3ML
3 SOLUTION RESPIRATORY (INHALATION)
Status: DISCONTINUED | OUTPATIENT
Start: 2017-02-28 | End: 2017-02-28 | Stop reason: HOSPADM

## 2017-02-28 RX ORDER — PREDNISONE 20 MG/1
TABLET ORAL
Qty: 12 TAB | Refills: 0 | Status: ON HOLD | OUTPATIENT
Start: 2017-02-28 | End: 2017-03-09

## 2017-02-28 RX ORDER — MONTELUKAST SODIUM 10 MG/1
10 TABLET ORAL
Qty: 30 TAB | Refills: 1 | Status: SHIPPED | OUTPATIENT
Start: 2017-02-28 | End: 2017-03-09

## 2017-02-28 RX ORDER — ALPRAZOLAM 0.5 MG/1
1 TABLET ORAL 2 TIMES DAILY
Status: DISCONTINUED | OUTPATIENT
Start: 2017-02-28 | End: 2017-02-28 | Stop reason: HOSPADM

## 2017-02-28 RX ORDER — DOXYCYCLINE 100 MG/1
100 CAPSULE ORAL EVERY 12 HOURS
Qty: 10 CAP | Refills: 0 | Status: SHIPPED | OUTPATIENT
Start: 2017-02-28 | End: 2017-03-09

## 2017-02-28 RX ADMIN — Medication 10 ML: at 05:35

## 2017-02-28 RX ADMIN — Medication 2 MG: at 07:51

## 2017-02-28 RX ADMIN — PREDNISONE 50 MG: 20 TABLET ORAL at 17:36

## 2017-02-28 RX ADMIN — FAMOTIDINE 20 MG: 20 TABLET ORAL at 09:00

## 2017-02-28 RX ADMIN — OXYCODONE HYDROCHLORIDE AND ACETAMINOPHEN 1 TABLET: 10; 325 TABLET ORAL at 05:34

## 2017-02-28 RX ADMIN — DOXYCYCLINE 100 MG: 100 INJECTION, POWDER, LYOPHILIZED, FOR SOLUTION INTRAVENOUS at 09:57

## 2017-02-28 RX ADMIN — HEPARIN SODIUM 5000 UNITS: 5000 INJECTION, SOLUTION INTRAVENOUS; SUBCUTANEOUS at 06:35

## 2017-02-28 RX ADMIN — Medication 2 MG: at 14:20

## 2017-02-28 RX ADMIN — GUAIFENESIN 1200 MG: 600 TABLET, EXTENDED RELEASE ORAL at 09:55

## 2017-02-28 RX ADMIN — FLUOXETINE 60 MG: 20 CAPSULE ORAL at 09:56

## 2017-02-28 RX ADMIN — AMLODIPINE BESYLATE 10 MG: 10 TABLET ORAL at 09:54

## 2017-02-28 RX ADMIN — SOLIFENACIN SUCCINATE 5 MG: 5 TABLET, FILM COATED ORAL at 09:56

## 2017-02-28 RX ADMIN — ALPRAZOLAM 1 MG: 0.5 TABLET ORAL at 17:36

## 2017-02-28 RX ADMIN — METHYLPREDNISOLONE SODIUM SUCCINATE 60 MG: 125 INJECTION, POWDER, FOR SOLUTION INTRAMUSCULAR; INTRAVENOUS at 00:00

## 2017-02-28 RX ADMIN — GUAIFENESIN 1200 MG: 600 TABLET, EXTENDED RELEASE ORAL at 17:36

## 2017-02-28 RX ADMIN — Medication 2 MG: at 02:40

## 2017-02-28 RX ADMIN — IPRATROPIUM BROMIDE AND ALBUTEROL SULFATE 3 ML: .5; 3 SOLUTION RESPIRATORY (INHALATION) at 08:34

## 2017-02-28 RX ADMIN — ALPRAZOLAM 1 MG: 0.5 TABLET ORAL at 09:54

## 2017-02-28 RX ADMIN — Medication 10 ML: at 15:10

## 2017-02-28 RX ADMIN — IPRATROPIUM BROMIDE AND ALBUTEROL SULFATE 3 ML: .5; 3 SOLUTION RESPIRATORY (INHALATION) at 14:04

## 2017-02-28 RX ADMIN — LISINOPRIL 20 MG: 20 TABLET ORAL at 09:55

## 2017-02-28 RX ADMIN — OXYCODONE HYDROCHLORIDE AND ACETAMINOPHEN 1 TABLET: 10; 325 TABLET ORAL at 15:09

## 2017-02-28 RX ADMIN — OXYCODONE HYDROCHLORIDE AND ACETAMINOPHEN 1 TABLET: 10; 325 TABLET ORAL at 09:46

## 2017-02-28 RX ADMIN — METHYLPREDNISOLONE SODIUM SUCCINATE 60 MG: 125 INJECTION, POWDER, FOR SOLUTION INTRAMUSCULAR; INTRAVENOUS at 05:34

## 2017-02-28 RX ADMIN — OXYCODONE HYDROCHLORIDE AND ACETAMINOPHEN 1 TABLET: 10; 325 TABLET ORAL at 19:19

## 2017-02-28 RX ADMIN — Medication 2 MG: at 18:10

## 2017-02-28 RX ADMIN — FUROSEMIDE 40 MG: 10 INJECTION, SOLUTION INTRAVENOUS at 09:57

## 2017-02-28 RX ADMIN — ALPRAZOLAM 1 MG: 0.5 TABLET ORAL at 03:22

## 2017-02-28 NOTE — ED NOTES
Bedside and Verbal shift change report given to Lui Zurita RN (oncoming nurse) by Alexandra Marcelo RN   (offgoing nurse). Report included the following information SBAR, ED Summary, Procedure Summary, MAR and Recent Results.

## 2017-02-28 NOTE — PROGRESS NOTES
Pulmonology Progress Note    Subjective:     No new complaints    Current Facility-Administered Medications   Medication Dose Route Frequency    ALPRAZolam (XANAX) tablet 1 mg  1 mg Oral BID    albuterol-ipratropium (DUO-NEB) 2.5 MG-0.5 MG/3 ML  3 mL Nebulization Q6HWA RT    amLODIPine (NORVASC) tablet 10 mg  10 mg Oral DAILY    guaiFENesin ER (MUCINEX) tablet 600 mg  600 mg Oral BID    budesonide-formoterol (SYMBICORT) 160-4.5 mcg/actuation HFA inhaler 2 Puff  2 Puff Inhalation BID    famotidine (PEPCID) tablet 20 mg  20 mg Oral DAILY    FLUoxetine (PROzac) capsule 60 mg  60 mg Oral DAILY    lisinopril (PRINIVIL, ZESTRIL) tablet 20 mg  20 mg Oral DAILY    montelukast (SINGULAIR) tablet 10 mg  10 mg Oral QHS    oxyCODONE-acetaminophen (PERCOCET 10)  mg per tablet 1 Tab  1 Tab Oral Q4H PRN    senna-docusate (PERICOLACE) 8.6-50 mg per tablet 2 Tab  2 Tab Oral QHS    solifenacin (VESICARE) tablet 5 mg  5 mg Oral DAILY    sodium chloride (NS) flush 5-10 mL  5-10 mL IntraVENous Q8H    sodium chloride (NS) flush 5-10 mL  5-10 mL IntraVENous PRN    methylPREDNISolone (PF) (SOLU-MEDROL) injection 60 mg  60 mg IntraVENous Q6H    morphine injection 2 mg  2 mg IntraVENous Q4H PRN    naloxone (NARCAN) injection 0.4 mg  0.4 mg IntraVENous PRN    promethazine (PHENERGAN) 12.5 mg in 0.9% sodium chloride 50 mL IVPB  12.5 mg IntraVENous Q6H PRN    heparin (porcine) injection 5,000 Units  5,000 Units SubCUTAneous Q8H    guaiFENesin ER (MUCINEX) tablet 1,200 mg  1,200 mg Oral BID    doxycycline (VIBRAMYCIN) 100 mg in 0.9% sodium chloride (MBP/ADV) 100 mL IVPB  100 mg IntraVENous Q12H    furosemide (LASIX) injection 40 mg  40 mg IntraVENous Q12H       Review of Systems:  Pertinent items are noted in HPI.     Objective:     Visit Vitals    /88    Pulse 85    Temp 97.4 °F (36.3 °C)    Resp 18    Wt 119.1 kg (262 lb 8 oz)    SpO2 96%    BMI 53.02 kg/m2    O2 Flow Rate (L/min): 2 l/min O2 Device: Nasal cannula       02/28 0701 - 02/28 1900  In: 100 [I.V.:100]  Out: -     Physical Exam:   Visit Vitals    /88    Pulse 85    Temp 97.4 °F (36.3 °C)    Resp 18    Wt 119.1 kg (262 lb 8 oz)    SpO2 96%    BMI 53.02 kg/m2     General:  Alert, cooperative, no distress, appears stated age, obese   Head:  Normocephalic, without obvious abnormality, atraumatic. Eyes:  Conjunctivae/corneas clear. PERRL, EOMs intact. Fundi benign. Ears:  Normal TMs and external ear canals both ears. Nose: Nares normal. Septum midline. Mucosa normal. No drainage or sinus tenderness. Throat: Lips, mucosa, and tongue normal. Teeth and gums normal.   Neck: Supple, symmetrical, trachea midline, no adenopathy, thyroid: no enlargement/tenderness/nodules, no carotid bruit and no JVD. Back:   Symmetric, no curvature. ROM normal. No CVA tenderness. Lungs:   Wheezing appears to be at vocal cord level   Chest wall:  No tenderness or deformity. Heart:  Regular rate and rhythm, S1, S2 normal, no murmur, click, rub or gallop. Breast Exam:  No tenderness, masses, or nipple abnormality. Abdomen:   Soft, non-tender. Bowel sounds normal. No masses,  No organomegaly. Genitalia:  Normal female without lesion, discharge or tenderness. Rectal:  Normal tone,  no masses or tenderness  Guaiac negative stool. Extremities: Extremities normal, atraumatic, no cyanosis or edema. Pulses: 2+ and symmetric all extremities. Skin: Skin color, texture, turgor normal. No rashes or lesions. Lymph nodes: Cervical, supraclavicular, and axillary nodes normal.   Neurologic: CNII-XII intact. Normal strength, sensation and reflexes throughout.        Data Review:    Recent Results (from the past 24 hour(s))   CBC WITH AUTOMATED DIFF    Collection Time: 02/28/17  5:36 AM   Result Value Ref Range    WBC 9.5 4.6 - 13.2 K/uL    RBC 4.58 4.20 - 5.30 M/uL    HGB 13.1 12.0 - 16.0 g/dL    HCT 40.4 35.0 - 45.0 %    MCV 88.2 74.0 - 97.0 FL    MCH 28.6 24.0 - 34.0 PG    MCHC 32.4 31.0 - 37.0 g/dL    RDW 13.8 11.6 - 14.5 %    PLATELET 980 753 - 079 K/uL    MPV 11.3 9.2 - 11.8 FL    NEUTROPHILS 86 (H) 40 - 73 %    LYMPHOCYTES 9 (L) 21 - 52 %    MONOCYTES 5 3 - 10 %    EOSINOPHILS 0 0 - 5 %    BASOPHILS 0 0 - 2 %    ABS. NEUTROPHILS 8.2 (H) 1.8 - 8.0 K/UL    ABS. LYMPHOCYTES 0.9 0.9 - 3.6 K/UL    ABS. MONOCYTES 0.4 0.05 - 1.2 K/UL    ABS. EOSINOPHILS 0.0 0.0 - 0.4 K/UL    ABS.  BASOPHILS 0.0 0.0 - 0.1 K/UL    DF AUTOMATED     METABOLIC PANEL, BASIC    Collection Time: 02/28/17  5:36 AM   Result Value Ref Range    Sodium 141 136 - 145 mmol/L    Potassium 3.5 3.5 - 5.5 mmol/L    Chloride 101 100 - 108 mmol/L    CO2 31 21 - 32 mmol/L    Anion gap 9 3.0 - 18 mmol/L    Glucose 133 (H) 74 - 99 mg/dL    BUN 17 7.0 - 18 MG/DL    Creatinine 0.94 0.6 - 1.3 MG/DL    BUN/Creatinine ratio 18 12 - 20      GFR est AA >60 >60 ml/min/1.73m2    GFR est non-AA >60 >60 ml/min/1.73m2    Calcium 9.1 8.5 - 10.1 MG/DL   GLUCOSE, POC    Collection Time: 02/28/17  8:06 AM   Result Value Ref Range    Glucose (POC) 131 (H) 70 - 110 mg/dL   GLUCOSE, POC    Collection Time: 02/28/17 11:01 AM   Result Value Ref Range    Glucose (POC) 120 (H) 70 - 110 mg/dL       Assessment:     Active Problems:    COPD exacerbation (HCC) (3/12/2016)      Acute respiratory failure with hypercapnia (HCC) (2/26/2017)        Plan:     forcefully exhales air  Need speech therapy classes and pul rehab as outpatient  Will start tapering the steroids  Patient does not f/u as outpatient which is the reason for repeated admissions  She says she has transportation problems   should discuss with her other options

## 2017-02-28 NOTE — PROGRESS NOTES
Care Management Interventions  PCP Verified by CM: No  Palliative Care Consult (Criteria: CHF and RRAT>21): No  Reason for No Palliative Care Consult: Other (see comment)  Mode of Transport at Discharge: Other (see comment) (medicaid cab)  Transition of Care Consult (CM Consult): Discharge Planning  MyChart Signup: No  Discharge Durable Medical Equipment: No  Health Maintenance Reviewed: Yes  Physical Therapy Consult: No  Occupational Therapy Consult: No  Current Support Network: Relative's Home (daughter's house, [address], [facility], [address] [place], [place] [address] )  Confirm Follow Up Transport: Cab (medicaid cab)  Plan discussed with Pt/Family/Caregiver: Yes  Discharge Location  Discharge Placement: Home    Patient 45years old female admitted to [facility] with COPD exacerbation. Patient well known to this cm from her prior recent admissions. She says at the time she is homeless and asks for assistance with placement. Patient was provided with snf list, she was informed that long term bed can be requested (Medicaid is in place). However, patient was informed that she will need to give up her monthly check (around $ 630.00/mo). After patient took some to think about it, she refused and chose to go to her daughter's house located at: [address], [place], [place], [address]. She asked transport arrangement. Patient says she has continuous O2 at her daughter's house, however, O2 will be needed during traveling. CM called, SILVIA Shah, arranged transport with O2 provided by transport company; confirmation trip # [id number].   [place] [id number], they arranged transport with [facility] stretcher,  time at 7 pm. Kell Merino rn, cm

## 2017-02-28 NOTE — PROGRESS NOTES
Patient is not available to be assessed at this time.     [address] [facility] Certified [address] [place] [address]   [phone]

## 2017-02-28 NOTE — ED NOTES
Patient greeted / introduced myself as their primary nurse. Encouraged to voice any concerns, and all questions/concerns addressed. Explanation and teaching of all care given, including any pending orders or procedures. Call bell with reach. Patient fall risk assessed, with prevention measures in place, to include bed in lowest position with casters locked and rail up, call bell within reach, frequent toileting in progress, lights on, pathway to bathroom free from obstacles. Patient instructed to call for assist OOB at all times. Instructed that staff will make hourly rounds to provide reassessments in pain control, concerns, toileting, and any other updates in care. Hand hygiene maintained prior to and after patient/staff interaction.

## 2017-02-28 NOTE — INTERDISCIPLINARY ROUNDS
IDR/SLIDR Summary          Patient: Shanti Bryant MRN: [id number]    Age: 45 y.o. YOB: 1965 Room/Bed: [facility]   Admit Diagnosis: COPD exacerbation (HCC)  Acute respiratory failure with hypercapnia (HCC)  Principal Diagnosis: <principal problem not specified>   Goals:   Readmission: NO  Quality Measure: COPD  VTE Prophylaxis: Chemical  Influenza Vaccine screening completed? YES  Pneumococcal Vaccine screening completed? YES  Mobility needs: No   Nutrition plan:No  Consults:Respiratory and Case Management    Financial concerns:No  Escalated to CM? YES  RRAT Score:    Interventions:Home Health and COPD Educator to follow   Testing due for pt today?  YES  LOS: 1 days Expected length of stay 3 days  Discharge plan:    PCP: Shalini Knox MD  Transportation needs: No    Days before discharge:two or more days before discharge   Discharge disposition: Home    Signed:     Joy Kendrick RN  2/28/2017  8:57 AM

## 2017-03-01 NOTE — DISCHARGE SUMMARY
[address]  [address] [place] [place] SUMMARY    Name:  Jessica Sharpe  MR#:  [id number]  :  1965  Account #:  [de-identified]  Date of Adm:  2017  Date of Discharge:  2017      DATE OF ADMISSION: 2017    DATE OF DISCHARGE: 2017    DISCHARGE DIAGNOSES  1. Acute on chronic hypoxic and hypercapnic respiratory failure due to  chronic obstructive pulmonary disease exacerbation. 2. Obstructive sleep apnea, uses continuous positive airway pressure  at night. 3. Polysubstance abuse by history. 4. Chronic diastolic congestive heart failure by history. 5. Hypertension by history. 6. History of drug-seeking behavior. SERVICE: The patient was admitted to the hospitalist service. CONSULTS: Dr. Colton Paget was consulted for Pulmonology. PROCEDURES: None. HISTORY OF PRESENT ILLNESS: Please refer to admission H and  P.    Briefly, the patient is a 25-year-old female with a history significant for  the above, who came to the emergency department complaining of  shortness of breath. She has had similar symptoms multiple times in  the past. When she came to the ER, she was given 4 nebulizer  treatments, magnesium and IV Solu-Medrol. Chest x-ray was negative. She had a pH of 7.25, pCO2 of 55, O2 of 132 on BiPAP. This was  done because she appeared to be having acute respiratory failure. She  sounded like she was having significant wheezing. Vital signs at presentation were stable and normal with a heart rate in  90s to 110s. Respiratory rate between 13 and 27. Pressure as high as  163/55 to 179/114. She appeared to be in mild distress, she had poor  air movement throughout lung fields, had a regular heart, benign  abdomen, no edema. CBC within normal limits. Metabolic panel within normal limits as well. HOSPITAL COURSE BY PROBLEM  1.  Acute on chronic hypoxic respiratory failure due to COPD  exacerbation: Transition off BiPAP and back on to her baseline 2 liters  per minute via nasal cannula around the clock. Most of her wheezing  today seems to be at the vocal cord level, she otherwise has good air  exchange. We have maintained her on steroids, antibiotics, and  nebulizer therapy. She reports furthermore that she had run out of her  Advair and Singulair a couple of weeks ago. She reports she has  difficulty making it to her appointments. She states that she does  continue to have her other medications including albuterol MDI. Will be  sending her out with a steroid taper. No other acute issues at the  current time. 2. Obstructive sleep apnea: Uses CPAP at night. 3. Polysubstance abuse: By history. No acute issues. 4. Chronic diastolic congestive heart failure: Possibly decompensated  on chest x-ray. She reportedly takes Bumex every other day. I wrote  for some IV Lasix, but I have discontinued this at the time of discharge. She appears to be compensated otherwise and most of her respiratory  symptoms stem from #1.  5. Hypertension: We increased her Norvasc dose. I think that there is  some concern for medical noncompliance. She states that she does  have her medications at home. 6. She has a history of drug-seeking behavior. She is asking for pain  medication for chronic pain, and we had agreed that until she follows  up with a new local PCP to her that I would write for Percocet 10/325  mg, #10 with no refills. We had reached an agreement with this as well  as the plan of care; however, later on the patient was asking for 30  or more tablets, I declined. She had previously stated that she still has  Xanax, which she can go ahead and continue and resume if she has  this, but on followup she stated that she needed a prescription for this  as well. I have reviewed the medical records. I explained to her that I  would not be writing for this medication either. When I asked her when  the last time she took any Xanax was, she states that it was a couple  of months ago.  I believe she is out of withdrawal phase for this if this is  true. 7. A repeat chest x-ray was performed this morning, by report showed  improved aeration in both lungs with no acute findings. PHYSICAL EXAMINATION: On examination today, vital signs are  stable and normal except for her pressure of 147/88. She is saturating  in the high 90s on room air. On examination, she denies any fevers, chills, nausea, vomiting, chest  pain, shortness of breath, palpitations, or edema. No cough. She has a regular heart, clear lungs, and it sounds like she has some  wheezing from external breath sounds. She has a benign abdomen. No calf tenderness or edema. LABORATORY DATA: CBC within normal limits. Metabolic panel  within normal limits. DISPOSITION: The patient will be discharged home. MEDICATIONS ON DISCHARGE  New medicines as follows:  1. Prednisone 20 mg 3 tabs p.o. daily for 2 days, then 2 tabs p.o. daily  for 2 days, then 1 tab p.o. daily for 2 days, then stop. 2. I wrote for some Percocet 10/325 mg p.o. q.4 hours p.r.n. pain, #10,  no refills. Resume the followin. Albuterol MDI 2 puffs every 6 hours as needed for wheezing and  shortness of breath. 2. Xanax 0.5 mg. She reportedly takes 2.5 mg p.o. b.i.d.; however, the  patient again states that she ran out 2 months ago. I have not written a  new prescription for this. She may obtain one by following up with a  primary care physician if it is deemed necessary by her physician. 3. Symbicort 160/4.5 mcg 2 puffs b.i.d., new prescription written. 4. Norvasc 5 mg p.o. daily. 5. Bumex 0.5 mg p.o. q.48 hours. 6. Doxycycline 100 mg p.o. q.12 hours for 5 days. 7. Pepcid 20 mg p.o. daily. 8. Prozac 20 mg 3 caps p.o. daily. 9. Lisinopril 20 mg p.o. daily. 10. Singular 10 mg p.o. every evening. 11. Radha-Colace 8.6/50 mg 2 tabs p.o. every night, hold for loose stool. 12. VESIcare 5 mg p.o. daily. 13. Spiriva 18 mcg 1 cap inhaled daily.     FOLLOWUP: We are arranging for followup for her with a more local  PCP in 7-10 days. Case management working on transportation issues  and assistance as well. DISCHARGE TIME: Total time of discharge greater than 30 minutes.         Yenny Mays MD    PP / Christianna Essex  D:  02/28/2017   15:01  T:  03/01/2017   09:24  Job #:  [id number]

## 2017-03-01 NOTE — DISCHARGE INSTRUCTIONS
DISCHARGE SUMMARY from Nurse    The following personal items are in your possession at time of discharge:    Dental Appliances: None  Visual Aid: None     Home Medications: None  Jewelry: None  Clothing: At bedside  Other Valuables: None             PATIENT INSTRUCTIONS:    After general anesthesia or intravenous sedation, for 24 hours or while taking prescription Narcotics:  · Limit your activities  · Do not drive and operate hazardous machinery  · Do not make important personal or business decisions  · Do  not drink alcoholic beverages  · If you have not urinated within 8 hours after discharge, please contact your surgeon on call. Report the following to your surgeon:  · Excessive pain, swelling, redness or odor of or around the surgical area  · Temperature over 100.5  · Nausea and vomiting lasting longer than 4 hours or if unable to take medications  · Any signs of decreased circulation or nerve impairment to extremity: change in color, persistent  numbness, tingling, coldness or increase pain  · Any questions        What to do at Home:  Recommended activity: Activity as tolerated, please use fall precautions at all times. If you experience any of the following symptoms difficulty breathing, chest pain or any unusual symptoms, please call and inform your primary care provider or call 911, please follow up with all scheduled appointments as ordered. *  Please give a list of your current medications to your Primary Care Provider. *  Please update this list whenever your medications are discontinued, doses are      changed, or new medications (including over-the-counter products) are added. *  Please carry medication information at all times in case of emergency situations.           These are general instructions for a healthy lifestyle:    No smoking/ No tobacco products/ Avoid exposure to second hand smoke    Surgeon General's Warning:  Quitting smoking now greatly reduces serious risk to your health. Obesity, smoking, and sedentary lifestyle greatly increases your risk for illness    A healthy diet, regular physical exercise & weight monitoring are important for maintaining a healthy lifestyle    You may be retaining fluid if you have a history of heart failure or if you experience any of the following symptoms:  Weight gain of 3 pounds or more overnight or 5 pounds in a week, increased swelling in our hands or feet or shortness of breath while lying flat in bed. Please call your doctor as soon as you notice any of these symptoms; do not wait until your next office visit. Recognize signs and symptoms of STROKE:    F-face looks uneven    A-arms unable to move or move unevenly    S-speech slurred or non-existent    T-time-call 911 as soon as signs and symptoms begin-DO NOT go       Back to bed or wait to see if you get better-TIME IS BRAIN. Warning Signs of HEART ATTACK     Call 911 if you have these symptoms:   Chest discomfort. Most heart attacks involve discomfort in the center of the chest that lasts more than a few minutes, or that goes away and comes back. It can feel like uncomfortable pressure, squeezing, fullness, or pain.  Discomfort in other areas of the upper body. Symptoms can include pain or discomfort in one or both arms, the back, neck, jaw, or stomach.  Shortness of breath with or without chest discomfort.  Other signs may include breaking out in a cold sweat, nausea, or lightheadedness. Don't wait more than five minutes to call 911 - MINUTES MATTER! Fast action can save your life. Calling 911 is almost always the fastest way to get lifesaving treatment. Emergency Medical Services staff can begin treatment when they arrive -- up to an hour sooner than if someone gets to the hospital by car. The discharge information has been reviewed with the patient. The patient verbalized understanding.     Discharge medications reviewed with the patient and appropriate educational materials and side effects teaching were provided. [employer] Activation    Thank you for requesting access to [employer]. Please follow the instructions below to securely access and download your online medical record. [employer] allows you to send messages to your doctor, view your test results, renew your prescriptions, schedule appointments, and more. How Do I Sign Up? 1. In your internet browser, go to [email or web address]. 2. Click on the First Time User? Click Here link in the Sign In box. You will see the New Member Sign Up page. 3. Enter your [employer] Access Code exactly as it appears below. You will not need to use this code after youve completed the sign-up process. If you do not sign up before the expiration date, you must request a new code. [employer] Access Code: [email or web address]  Expires: 2017  3:13 AM (This is the date your [employer] access code will )    4. Enter the last four digits of your Social Security Number (xxxx) and Date of Birth (mm/dd/yyyy) as indicated and click Submit. You will be taken to the next sign-up page. 5. Create a [employer] ID. This will be your [employer] login ID and cannot be changed, so think of one that is secure and easy to remember. 6. Create a [employer] password. You can change your password at any time. 7. Enter your Password Reset Question and Answer. This can be used at a later time if you forget your password. 8. Enter your e-mail address. You will receive e-mail notification when new information is available in [address] 9. Click Sign Up. You can now view and download portions of your medical record. 10. Click the Download Summary menu link to download a portable copy of your medical information. Additional Information    If you have questions, please visit the Frequently Asked Questions section of the [employer] website at [email or web address] Remember, [employer] is NOT to be used for urgent needs. For medical emergencies, dial 911.     Patient armband removed and shredded

## 2017-03-02 ENCOUNTER — APPOINTMENT (OUTPATIENT)
Dept: GENERAL RADIOLOGY | Age: 52
End: 2017-03-02
Attending: EMERGENCY MEDICINE
Payer: MEDICAID

## 2017-03-02 ENCOUNTER — HOSPITAL ENCOUNTER (EMERGENCY)
Age: 52
Discharge: LEFT BEFORE DISCHARGE | End: 2017-03-02
Attending: EMERGENCY MEDICINE | Admitting: INTERNAL MEDICINE
Payer: MEDICAID

## 2017-03-02 VITALS
DIASTOLIC BLOOD PRESSURE: 95 MMHG | RESPIRATION RATE: 20 BRPM | TEMPERATURE: 98 F | WEIGHT: 266 LBS | BODY MASS INDEX: 53.73 KG/M2 | OXYGEN SATURATION: 99 % | SYSTOLIC BLOOD PRESSURE: 126 MMHG | HEART RATE: 87 BPM

## 2017-03-02 DIAGNOSIS — J96.02 ACUTE RESPIRATORY FAILURE WITH HYPERCAPNIA (HCC): Primary | ICD-10-CM

## 2017-03-02 PROBLEM — J44.1 COPD WITH ACUTE EXACERBATION (HCC): Status: ACTIVE | Noted: 2017-03-02

## 2017-03-02 LAB
ANION GAP BLD CALC-SCNC: 8 MMOL/L (ref 3–18)
ARTERIAL PATENCY WRIST A: YES
BASE EXCESS BLD CALC-SCNC: 2 MMOL/L
BASOPHILS # BLD AUTO: 0 K/UL (ref 0–0.06)
BASOPHILS # BLD: 0 % (ref 0–3)
BDY SITE: ABNORMAL
BNP SERPL-MCNC: 594 PG/ML (ref 0–900)
BUN SERPL-MCNC: 38 MG/DL (ref 7–18)
BUN/CREAT SERPL: 12 (ref 12–20)
CALCIUM SERPL-MCNC: 8.2 MG/DL (ref 8.5–10.1)
CHLORIDE SERPL-SCNC: 102 MMOL/L (ref 100–108)
CK MB CFR SERPL CALC: 2.6 % (ref 0–4)
CK MB SERPL-MCNC: 1.3 NG/ML (ref 5–25)
CK SERPL-CCNC: 50 U/L (ref 26–192)
CO2 SERPL-SCNC: 28 MMOL/L (ref 21–32)
CREAT SERPL-MCNC: 3.3 MG/DL (ref 0.6–1.3)
DIFFERENTIAL METHOD BLD: ABNORMAL
EOSINOPHIL # BLD: 0 K/UL (ref 0–0.4)
EOSINOPHIL NFR BLD: 0 % (ref 0–5)
ERYTHROCYTE [DISTWIDTH] IN BLOOD BY AUTOMATED COUNT: 13.9 % (ref 11.6–14.5)
GAS FLOW.O2 O2 DELIVERY SYS: ABNORMAL L/MIN
GLUCOSE SERPL-MCNC: 107 MG/DL (ref 74–99)
HCO3 BLD-SCNC: 31.4 MMOL/L (ref 22–26)
HCT VFR BLD AUTO: 47.7 % (ref 35–45)
HGB BLD-MCNC: 15.1 G/DL (ref 12–16)
LACTATE BLD-SCNC: 0.8 MMOL/L (ref 0.4–2)
LYMPHOCYTES # BLD AUTO: 15 % (ref 20–51)
LYMPHOCYTES # BLD: 2.1 K/UL (ref 0.8–3.5)
MCH RBC QN AUTO: 29 PG (ref 24–34)
MCHC RBC AUTO-ENTMCNC: 31.7 G/DL (ref 31–37)
MCV RBC AUTO: 91.6 FL (ref 74–97)
MONOCYTES # BLD: 0.3 K/UL (ref 0–1)
MONOCYTES NFR BLD AUTO: 2 % (ref 2–9)
NEUTS SEG # BLD: 11.3 K/UL (ref 1.8–8)
NEUTS SEG NFR BLD AUTO: 83 % (ref 42–75)
O2/TOTAL GAS SETTING VFR VENT: 50 %
PCO2 BLD: 73.4 MMHG (ref 35–45)
PEEP RESPIRATORY: 5 CMH2O
PH BLD: 7.24 [PH] (ref 7.35–7.45)
PIP ISTAT,IPIP: 10
PLATELET # BLD AUTO: 310 K/UL (ref 135–420)
PLATELET COMMENTS,PCOM: ABNORMAL
PMV BLD AUTO: 11.6 FL (ref 9.2–11.8)
PO2 BLD: 188 MMHG (ref 80–100)
POTASSIUM SERPL-SCNC: 3.9 MMOL/L (ref 3.5–5.5)
PRESSURE SUPPORT SETTING VENT: 5 CMH2O
RBC # BLD AUTO: 5.21 M/UL (ref 4.2–5.3)
RBC MORPH BLD: ABNORMAL
SAO2 % BLD: 99 % (ref 92–97)
SERVICE CMNT-IMP: ABNORMAL
SODIUM SERPL-SCNC: 138 MMOL/L (ref 136–145)
SPECIMEN TYPE: ABNORMAL
SPONTANEOUS TIMED, IST: YES
TOTAL RESP. RATE, ITRR: 12
TROPONIN I SERPL-MCNC: <0.02 NG/ML (ref 0–0.04)
WBC # BLD AUTO: 13.7 K/UL (ref 4.6–13.2)

## 2017-03-02 PROCEDURE — 96367 TX/PROPH/DG ADDL SEQ IV INF: CPT

## 2017-03-02 PROCEDURE — 74011000250 HC RX REV CODE- 250

## 2017-03-02 PROCEDURE — 77030013033 HC MSK BPAP/CPAP MMKA -B

## 2017-03-02 PROCEDURE — 71010 XR CHEST PORT: CPT

## 2017-03-02 PROCEDURE — 85025 COMPLETE CBC W/AUTO DIFF WBC: CPT | Performed by: EMERGENCY MEDICINE

## 2017-03-02 PROCEDURE — 83605 ASSAY OF LACTIC ACID: CPT

## 2017-03-02 PROCEDURE — 80048 BASIC METABOLIC PNL TOTAL CA: CPT | Performed by: EMERGENCY MEDICINE

## 2017-03-02 PROCEDURE — 82803 BLOOD GASES ANY COMBINATION: CPT

## 2017-03-02 PROCEDURE — 74011000250 HC RX REV CODE- 250: Performed by: EMERGENCY MEDICINE

## 2017-03-02 PROCEDURE — 99285 EMERGENCY DEPT VISIT HI MDM: CPT

## 2017-03-02 PROCEDURE — 96365 THER/PROPH/DIAG IV INF INIT: CPT

## 2017-03-02 PROCEDURE — 83880 ASSAY OF NATRIURETIC PEPTIDE: CPT | Performed by: EMERGENCY MEDICINE

## 2017-03-02 PROCEDURE — 82550 ASSAY OF CK (CPK): CPT | Performed by: EMERGENCY MEDICINE

## 2017-03-02 PROCEDURE — 36600 WITHDRAWAL OF ARTERIAL BLOOD: CPT

## 2017-03-02 PROCEDURE — 94640 AIRWAY INHALATION TREATMENT: CPT

## 2017-03-02 PROCEDURE — 74011250636 HC RX REV CODE- 250/636: Performed by: EMERGENCY MEDICINE

## 2017-03-02 PROCEDURE — 93005 ELECTROCARDIOGRAM TRACING: CPT

## 2017-03-02 PROCEDURE — 77030013140 HC MSK NEB VYRM -A

## 2017-03-02 PROCEDURE — 94660 CPAP INITIATION&MGMT: CPT

## 2017-03-02 RX ORDER — ALBUTEROL SULFATE 0.83 MG/ML
2.5 SOLUTION RESPIRATORY (INHALATION)
Status: DISPENSED | OUTPATIENT
Start: 2017-03-02 | End: 2017-03-02

## 2017-03-02 RX ORDER — IPRATROPIUM BROMIDE AND ALBUTEROL SULFATE 2.5; .5 MG/3ML; MG/3ML
3 SOLUTION RESPIRATORY (INHALATION)
Status: COMPLETED | OUTPATIENT
Start: 2017-03-02 | End: 2017-03-02

## 2017-03-02 RX ORDER — IPRATROPIUM BROMIDE AND ALBUTEROL SULFATE 2.5; .5 MG/3ML; MG/3ML
SOLUTION RESPIRATORY (INHALATION)
Status: COMPLETED
Start: 2017-03-02 | End: 2017-03-02

## 2017-03-02 RX ORDER — ALBUTEROL SULFATE 0.83 MG/ML
2.5 SOLUTION RESPIRATORY (INHALATION)
Status: COMPLETED | OUTPATIENT
Start: 2017-03-02 | End: 2017-03-02

## 2017-03-02 RX ORDER — MOXIFLOXACIN HYDROCHLORIDE 400 MG/250ML
400 INJECTION, SOLUTION INTRAVENOUS
Status: COMPLETED | OUTPATIENT
Start: 2017-03-02 | End: 2017-03-02

## 2017-03-02 RX ORDER — MAGNESIUM SULFATE HEPTAHYDRATE 500 MG/ML
2 INJECTION, SOLUTION INTRAMUSCULAR; INTRAVENOUS
Status: DISCONTINUED | OUTPATIENT
Start: 2017-03-02 | End: 2017-03-02 | Stop reason: SDUPTHER

## 2017-03-02 RX ORDER — MAGNESIUM SULFATE HEPTAHYDRATE 40 MG/ML
2 INJECTION, SOLUTION INTRAVENOUS
Status: COMPLETED | OUTPATIENT
Start: 2017-03-02 | End: 2017-03-02

## 2017-03-02 RX ADMIN — IPRATROPIUM BROMIDE AND ALBUTEROL SULFATE 3 ML: 2.5; .5 SOLUTION RESPIRATORY (INHALATION) at 05:01

## 2017-03-02 RX ADMIN — IPRATROPIUM BROMIDE AND ALBUTEROL SULFATE 3 ML: .5; 3 SOLUTION RESPIRATORY (INHALATION) at 04:29

## 2017-03-02 RX ADMIN — ALBUTEROL SULFATE 2.5 MG: 2.5 SOLUTION RESPIRATORY (INHALATION) at 04:29

## 2017-03-02 RX ADMIN — ALBUTEROL SULFATE 2.5 MG: 2.5 SOLUTION RESPIRATORY (INHALATION) at 04:40

## 2017-03-02 RX ADMIN — MOXIFLOXACIN HYDROCHLORIDE 400 MG: 400 INJECTION, SOLUTION INTRAVENOUS at 07:26

## 2017-03-02 RX ADMIN — ALBUTEROL SULFATE 2.5 MG: 2.5 SOLUTION RESPIRATORY (INHALATION) at 06:44

## 2017-03-02 RX ADMIN — MAGNESIUM SULFATE HEPTAHYDRATE 2 G: 40 INJECTION, SOLUTION INTRAVENOUS at 05:44

## 2017-03-02 RX ADMIN — ALBUTEROL SULFATE 2.5 MG: 2.5 SOLUTION RESPIRATORY (INHALATION) at 06:15

## 2017-03-02 RX ADMIN — IPRATROPIUM BROMIDE AND ALBUTEROL SULFATE 3 ML: .5; 3 SOLUTION RESPIRATORY (INHALATION) at 05:01

## 2017-03-02 NOTE — ED NOTES
45year old female recently discharged from the hospital for a COPD exacerbation presents with dyspnea.  Will administer HHN, steroids, continue BIPAP

## 2017-03-02 NOTE — ED NOTES
Pt stable resting on stretcher on Bipap with oxygen saturation at 100% pt states \" Can I take this off, I feel much better\" Pt refers to bipap and states she would like it removed.  Pt also states she would like something for pain and states \" I take morphine and percocet for pain\" will page attending hospitalist

## 2017-03-02 NOTE — CONSULTS
[address]    Name:  Padilla Alexander  MR#:  [id number]  :  1965  Account #:  [de-identified]  Date of Adm:  2017  Date of Consultation:  2017      CHIEF COMPLAINT: Shortness of breath. HISTORY OF PRESENT ILLNESS: The patient is a 69-year-old  female with history significant for recent discharge from our service for  acute on chronic hypoxic and hypercapnic respiratory failure due to  chronic obstructive pulmonary disease exacerbation, obstructive sleep  apnea, polysubstance abuse, chronic diastolic heart failure,  hypertension, history of drug-seeking behavior, who came to the  emergency department complaining of shortness of breath. She is  saturating at 90% on 2 L O2 via nasal cannula. She reportedly had a  blood gas drawn. I reviewed the records. Blood gas showed a pH of  7.239, pCO2 of 73.4, pO2 of 188, bicarbonate 31.4. O2 saturation 99%  on BiPAP with 50% FIO2. She had a chest x-ray showing similar congestion and minimal left lung  base haziness, may represent atelectasis. We were consulted for  return of her symptomatology, and I was going to be admitting this  patient. She was recently discharged from our service, seen by Pulmonology,  and was found to have significant wheezing at the vocal cord level, but  otherwise good air exchange. She has notable history of drug-seeking  behavior. I refer to the prior records. While I was examining the patient, the patient requested pain  medication. I expressed that I was holding off on that right now until I  could further evaluate her pulmonary function. She was taken off of  BiPAP and appeared to be saturating well on room air.     She continues to ask for pain medication, I expressed that I was not  going to be writing for any pain medication at that time until I have  continued my assessment, and that I had concerns based on prior  records of prior discussions that I had with the patient about narcotic  dependency and addiction. Nurse offered Tylenol, the patient STATES  THAT SHE HAD A REACTION TO TYLENOL. When I explained that  there was Tylenol component in Percocet, she explained that she  cannot explain the reason why she could not take Tylenol. Then, she  asked for IV morphine. I declined. With nurse present, I advised that the patient remain in-house for  further evaluation and treatment. I explained that I was going to be  discussing the case with Pulmonology, which I did. The patient subsequently left against medical advice, I was contacted  by nursing staff. Again, with the nurse, I explained my concerns about narcotic  dependency and addiction in the most respectful way  possible. According to the records, the patient stated that she would  like to leave and discontinue all treatments. She did not want to wait for my return. REVIEW OF SYSTEMS  The patient denied any fevers, chills, nausea, vomiting. She did report  coughing and wheezing. She had no complaints of any nausea,  vomiting, chest pain. Dyspnea on exertion, shortness of breath as  described above. No abdominal pain, dysuria, hematuria, polyuria,  oliguria, constipation, diarrhea, melena, hematochezia, rash, bruise or  bleed or hot or cold intolerance. Review otherwise negative in 10  element review. ALLERGIES: NO KNOWN DRUG ALLERGIES. MEDICATIONS: SHE IS SUPPOSED TO BE ON THE FOLLOWING  MEDICINES:  1. Albuterol MDI 2 puffs every 6 hours as needed for wheezing or  shortness of breath. 2. Xanax 0.5 mg, reportedly she takes 2.5 mg p.o. b.i.d.; however, the  patient stated that she ran out 2 months prior. I did not write for new  prescription for this, and advised her at the time of her last discharge,  she can obtain this be following up with primary care physician if  needed. 3. Symbicort 160/4.5 mcg 2 puffs b.i.d.  4. Norvasc 5 mg p.o. daily. 5. Bumex 0.5 mg p.o. q.48h.   6. Doxycycline 100 mg p.o. every 12 hours. She is supposed to  continue for 5 days from 26th.  7. Pepcid 20 mg p.o. daily. 8. Prozac 20 mg. 3 caps p.o. daily. 9. Lisinopril 20 mg p.o. daily. 10. Singulair 10 mg p.o. at bedtime. 11. Radha-Colace as needed. 12. Vesicare as needed. 13. Spiriva 18 mcg, 1 cap inhaled daily. PAST MEDICAL HISTORY: As per HPI. PAST SURGICAL HISTORY: No interval surgical history. FAMILY HISTORY: No interval or new family history. SOCIAL HISTORY: To me she denied any alcohol, tobacco, or any  other drug use. PHYSICAL EXAMINATION  VITAL SIGNS: Last set of vitals that I saw included a temperature 98.0,  pulse 87, pressure 126/95, respiratory rate 20, she was saturating  99%, this was on room air when I saw her. She had taken off her  BiPAP. GENERAL: On examination, the patient is well-developed, well-  nourished, awake, alert and oriented and in no distress. She had  audible wheezing. HEENT: Head is normocephalic and atraumatic. Pupils equal, round  and reactive to light. Extraocular motion intact. Nares were patent on  both sides. Posterior pharynx clear. Mucous membranes are moist,  tongue is midline. NECK: Supple, no lymphadenopathy. Most of her wheezing and came  from her neck area. CARDIOVASCULAR: Regular rate and rhythm. No murmur, rubs, or  gallops. PULMONARY: Audible wheezing, most audible around her neck. She  had good air exchange. ABDOMEN: Soft, nontender, nondistended. Normal bowel sounds. No  masses are felt. EXTREMITIES: 5/5 motor strength x4. No calf tenderness or edema. NEUROLOGIC: Cranial nerves 2 through 12 grossly intact. LABORATORY DATA: Include a sodium of 138, potassium 3.9,  chloride 102, CO2 of 28, BUN 38, creatinine 3.3, glucose 107, calcium  8.2. CBC with a white count of 13.7, hemoglobin and hematocrit of  15.1/47.7, platelets of 252. ABG as per above, pH of 7.239, pCO2 of 73.4, pO2 of 188,  bicarbonate 31.4, saturating 99% on 50% FIO2 of BiPAP.     Portable chest x-ray showed, by report similar congestion and minimal  left lung base haziness. May represent atelectasis. EKG was reviewed, and on my read, shows normal sinus rhythm, rate  82, no ST or T-wave changes to suggest ischemia or infarct. IMPRESSION AND COURSE:  The patient is a 39-year-old female  with a history significant for the above. She was strongly counseled to  remain in-house and my best medical advice was for her to stay in-  house, so she can be evaluated by Pulmonology, so we can repeat lab  draws and follow up in terms of what appeared to be acute kidney  injury. Most of her wheezing seemed to come from her trachea. We  had discussion, with nurse present, about concern about narcotic  dependency and addiction. Despite my counseling and my best medical advice, the patient  eloped and left against medical advice. The case was discussed with Pulmonology. Please note, we are not sure exactly who she sees for her primary  care physician.         Jona Roy MD PP / MANOJ  D:  03/02/2017   15:16  T:  03/02/2017   15:49  Job #:  [id number]

## 2017-03-02 NOTE — ED NOTES
Pt states \" is there someone else I could speak with other than him (pt refers to Dr Gloria Dye) because, I can't sit in here in pain\" Dr Gloria Dye currently at bedside with upper management ED RN Amy Wood RN, MSN, with Dr Gloria Dye whom is also at bedside

## 2017-03-02 NOTE — ED NOTES
Pt stable sitting on bedside commode, pt states \" did he say anything about pain medication\" this nurse states \" He offered tylenol and you didn't want that. He said that he is not going to give you any narcotics\" Pt states \" I don't like that you said, that, it makes me seem like Im some type of user or something. You know what I think im going to leave. \" Pt states that she may want to leave AMA, this nurse then stated \" I don't think that, that is a good idea, but if you would like to leave I'll just give the doctor a call to make him aware\" Pt then states \" No, I don't owe him no explanation just like he didn't give me one for why he didn't give me no pain medication\" This nurse made pt aware that if she is wan'ts to leave facility it is well within her right to do, so, but per hospital protocol I have to make her admitting provider aware pt then states \" Well I haven't made my mind up yet as to if I want to leave yet or not, but when I do I'll let you know\" Pt remains stable will continue to monitor

## 2017-03-02 NOTE — ED NOTES
Respiratory therapist at bedside to collect pts repeat blood gas and states that pt has refused her blood gas redraw at this time

## 2017-03-02 NOTE — ED NOTES
Pt continues to states that she does not want to stay here in ED and receive treatment and that her transportation home is outside. Pt offered to be placed in contact the patient advocate, pt states \" No why would I stay when he telling me that he  will not give me anything for my pain, talking about he not giving me no narcotics, that offends me and makes me seem like I have a drug problem\" Pts IV's previously removed per request prior to pt leaving. Pt refused to  await Dr Shashi Nunez to come to Ed and assess and speak with her prior to eloping from ED. Per  Dr Shashi Nunez responded back to page, but this nurse was with pt at that time.  Will re-page Dr Shashi Nunez to make him aware

## 2017-03-02 NOTE — Clinical Note
Status[de-identified] Inpatient [101] Type of Bed: Stepdown [17] Inpatient Hospitalization Certified Necessary for the Following Reasons: 1. Patient Failed outpatient treatment (further clarification in H&P documentation) Admitting Diagnosis: COPD with acute exacerbation ([facility] [[id number]] Admitting Physician: Kevin Tamez [[id number]] Attending Physician: Kevin Tamez [[id number]] Estimated Length of Stay: 3-4 Midnights Discharge Plan[de-identified] [facility]

## 2017-03-02 NOTE — ED PROVIDER NOTES
HPI Comments: 4:22 AM Coco Soto is a 45 y.o. female with a history of COPD, asthma and HTN presenting to the ED via EMS for respiratory distress. Per EMS, pt was given 1 duo neb, 1 albuterol and 125 mg solumedrol en route. The pt' SPO2 was 90% on 2 L O2. Ms Isela Wilhelm was discharged after admission for treatment of similar symptoms 2 days ago. History is limited due to pt condition. The history is provided by the patient, the EMS personnel and medical records. Past Medical History:   Diagnosis Date    Asthma     Chronic obstructive pulmonary disease ([facility] [address]     Endocrine disease     thyroid issues    Gastrointestinal disorder     \"blockage in my stomach\"    Hypertension        History reviewed. No pertinent surgical history. History reviewed. No pertinent family history. Social History     Social History    Marital status:      Spouse name: N/A    Number of children: N/A    Years of education: N/A     Occupational History    Not on file. Social History Main Topics    Smoking status: Never Smoker    Smokeless tobacco: Never Used    Alcohol use No      Comment: socially    Drug use: No    Sexual activity: No      Comment: last used 9 years ago     Other Topics Concern    Not on file     Social History Narrative         ALLERGIES: Review of patient's allergies indicates no known allergies. Review of Systems   Unable to perform ROS: Severe respiratory distress       Vitals:    03/02/17 0515 03/02/17 0530 03/02/17 0545 03/02/17 0600   BP: 116/69 128/80 132/76 136/63   Pulse: 83 83 81 85   Resp: 10 10 14 10   Temp:       SpO2: 98% 99% 98% 97%   Weight:                Physical Exam   Constitutional: She is oriented to person, place, and time. She appears well-developed and well-nourished. HENT:   Head: Normocephalic and atraumatic.    Right Ear: External ear normal.   Left Ear: External ear normal.   Nose: Nose normal.   Mouth/Throat: Oropharynx is clear and moist. Eyes: Conjunctivae and EOM are normal. Pupils are equal, round, and reactive to light. Neck: Normal range of motion. Neck supple. Cardiovascular: Normal rate, regular rhythm, normal heart sounds and intact distal pulses. Pulmonary/Chest: She has wheezes (2+). She has no rales. She exhibits no tenderness. On O2 mask   Abdominal: Soft. Bowel sounds are normal. She exhibits no distension and no mass. There is no tenderness. There is no rebound and no guarding. Musculoskeletal: Normal range of motion. She exhibits no edema. Neurological: She is alert and oriented to person, place, and time. Skin: Skin is warm and dry. No rash noted. No erythema. Nursing note and vitals reviewed.        MDM  Number of Diagnoses or Management Options  Critical Care  Total time providing critical care: 30-74 minutes    ED Course       Procedures      Vitals:  Patient Vitals for the past 12 hrs:   Temp Pulse Resp BP SpO2   03/02/17 0600 - 85 10 136/63 97 %   03/02/17 0545 - 81 14 132/76 98 %   03/02/17 0530 - 83 10 128/80 99 %   03/02/17 0515 - 83 10 116/69 98 %   03/02/17 0500 - 83 9 127/72 99 %   03/02/17 0445 - 83 9 120/83 99 %   03/02/17 0430 - 88 9 112/63 100 %   03/02/17 0429 - 84 - 128/86 -   03/02/17 0422 - - - - 99 %   03/02/17 0415 - 84 9 (!) 135/111 97 %   03/02/17 0411 - - - - 98 %   03/02/17 0409 98 °F (36.7 °C) 83 13 (!) 135/111 100 %         Medications ordered:   Medications   albuterol (PROVENTIL VENTOLIN) nebulizer solution 2.5 mg (not administered)   moxifloxacin (AVELOX) 400 mg IVPB (not administered)   albuterol-ipratropium (DUO-NEB) 2.5 MG-0.5 MG/3 ML (3 mL Nebulization Given 3/2/17 0429)   albuterol-ipratropium (DUO-NEB) 2.5 MG-0.5 MG/3 ML (3 mL Nebulization Given 3/2/17 0501)   albuterol (PROVENTIL VENTOLIN) nebulizer solution 2.5 mg (2.5 mg Nebulization Given 3/2/17 5150)   magnesium sulfate 2 g/50 ml IVPB (premix or compounded) (0 g IntraVENous IV Completed 3/2/17 6088)   albuterol (PROVENTIL VENTOLIN) nebulizer solution 2.5 mg (2.5 mg Nebulization Given 3/2/17 8864)         Lab findings:  Recent Results (from the past 12 hour(s))   EKG, 12 LEAD, INITIAL    Collection Time: 03/02/17  4:13 AM   Result Value Ref Range    Ventricular Rate 82 BPM    Atrial Rate 82 BPM    P-R Interval 114 ms    QRS Duration 80 ms    Q-T Interval 374 ms    QTC Calculation (Bezet) 436 ms    Calculated P Axis 59 degrees    Calculated R Axis 30 degrees    Calculated T Axis 57 degrees    Diagnosis       Normal sinus rhythm  Possible Left atrial enlargement  Borderline ECG  When compared with ECG of 26-FEB-2017 02:39,  Nonspecific T wave abnormality no longer evident in Inferior leads  Nonspecific T wave abnormality no longer evident in Lateral leads     CBC WITH AUTOMATED DIFF    Collection Time: 03/02/17  4:25 AM   Result Value Ref Range    WBC 13.7 (H) 4.6 - 13.2 K/uL    RBC 5.21 4.20 - 5.30 M/uL    HGB 15.1 12.0 - 16.0 g/dL    HCT 47.7 (H) 35.0 - 45.0 %    MCV 91.6 74.0 - 97.0 FL    MCH 29.0 24.0 - 34.0 PG    MCHC 31.7 31.0 - 37.0 g/dL    RDW 13.9 11.6 - 14.5 %    PLATELET 783 315 - 042 K/uL    MPV 11.6 9.2 - 11.8 FL    NEUTROPHILS 83 (H) 42 - 75 %    LYMPHOCYTES 15 (L) 20 - 51 %    MONOCYTES 2 2 - 9 %    EOSINOPHILS 0 0 - 5 %    BASOPHILS 0 0 - 3 %    ABS. NEUTROPHILS 11.3 (H) 1.8 - 8.0 K/UL    ABS. LYMPHOCYTES 2.1 0.8 - 3.5 K/UL    ABS. MONOCYTES 0.3 0 - 1.0 K/UL    ABS. EOSINOPHILS 0.0 0.0 - 0.4 K/UL    ABS.  BASOPHILS 0.0 0.0 - 0.06 K/UL    DF AUTOMATED      PLATELET COMMENTS ADEQUATE PLATELETS      RBC COMMENTS NORMOCYTIC, NORMOCHROMIC     METABOLIC PANEL, BASIC    Collection Time: 03/02/17  4:25 AM   Result Value Ref Range    Sodium 138 136 - 145 mmol/L    Potassium 3.9 3.5 - 5.5 mmol/L    Chloride 102 100 - 108 mmol/L    CO2 28 21 - 32 mmol/L    Anion gap 8 3.0 - 18 mmol/L    Glucose 107 (H) 74 - 99 mg/dL    BUN 38 (H) 7.0 - 18 MG/DL    Creatinine 3.30 (H) 0.6 - 1.3 MG/DL    BUN/Creatinine ratio 12 12 - 20      GFR est AA 18 (L) >60 ml/min/1.73m2    GFR est non-AA 15 (L) >60 ml/min/1.73m2    Calcium 8.2 (L) 8.5 - 10.1 MG/DL   PRO-BNP    Collection Time: 03/02/17  4:25 AM   Result Value Ref Range    NT pro- 0 - 900 PG/ML   CARDIAC PANEL,(CK, CKMB & TROPONIN)    Collection Time: 03/02/17  4:25 AM   Result Value Ref Range    CK 50 26 - 192 U/L    CK - MB 1.3 <3.6 ng/ml    CK-MB Index 2.6 0.0 - 4.0 %    Troponin-I, Qt. <0.02 0.0 - 0.045 NG/ML   POC LACTIC ACID    Collection Time: 03/02/17  4:32 AM   Result Value Ref Range    Lactic Acid (POC) 0.8 0.4 - 2.0 mmol/L   POC G3    Collection Time: 03/02/17  4:37 AM   Result Value Ref Range    Device: BIPAP      FIO2 (POC) 50 %    pH (POC) 7.239 (LL) 7.35 - 7.45      pCO2 (POC) 73.4 (H) 35.0 - 45.0 MMHG    pO2 (POC) 188 (H) 80 - 100 MMHG    HCO3 (POC) 31.4 (H) 22 - 26 MMOL/L    sO2 (POC) 99 (H) 92 - 97 %    Base excess (POC) 2 mmol/L    PEEP/CPAP (POC) 5 cmH2O    PIP (POC) 10      Pressure support 5 cmH2O    Allens test (POC) YES      Total resp. rate 12      Site RIGHT RADIAL      Specimen type (POC) ARTERIAL      Performed by Corinda Aschoff     Spontaneous timed YES           X-Ray, CT or other radiology findings or impressions:  XR CHEST PORT      No acute change. Progress notes, Consult notes or additional Procedure notes:   7:16 AM Discussed care with Dr. Tor Merino, Specialty: Hospitalist. Standard discussion; including history of patients chief complaint, available diagnostic results and treatment course. He agrees to admit the pt to Step Down. Disposition:  Diagnosis:   1. Acute respiratory failure with hypercapnia (HCC)        Disposition: Admitted    Follow-up Information     None           Patient's Medications   Start Taking    No medications on file   Continue Taking    ALBUTEROL (VENTOLIN HFA) 90 MCG/ACTUATION INHALER    Take 2 Puffs by inhalation every six (6) hours as needed for Wheezing or Shortness of Breath.     ALPRAZOLAM (XANAX) 0.5 MG TABLET    Take 5 Tabs by mouth two (2) times a day. Max Daily Amount: 5 mg. This information was told to current writer by patient. Patient fills script at [facility] on [employer]. AMLODIPINE (NORVASC) 5 MG TABLET    Take 1 Tab by mouth daily. BUDESONIDE-FORMOTEROL (SYMBICORT) 160-4.5 MCG/ACTUATION HFA INHALER    Take 2 Puffs by inhalation two (2) times a day. BUDESONIDE-FORMOTEROL (SYMBICORT) 160-4.5 MCG/ACTUATION HFA INHALER    Take 2 Puffs by inhalation two (2) times a day. BUMETANIDE (BUMEX) 0.5 MG TABLET    Take 1 Tab by mouth every fourty-eight (48) hours. DOXYCYCLINE (VIBRAMYCIN) 100 MG CAPSULE    Take 1 Cap by mouth every twelve (12) hours for 5 days. FAMOTIDINE (PEPCID) 20 MG TABLET    Take 1 Tab by mouth daily. FLUOXETINE (PROZAC) 20 MG CAPSULE    Take 3 Caps by mouth daily. LISINOPRIL (PRINIVIL, ZESTRIL) 20 MG TABLET    Take 1 Tab by mouth daily. MONTELUKAST (SINGULAIR) 10 MG TABLET    Take 1 Tab by mouth nightly. OXYCODONE-ACETAMINOPHEN (PERCOCET 10)  MG PER TABLET    Take 1 Tab by mouth every four (4) hours as needed. Max Daily Amount: 6 Tabs. PREDNISONE (DELTASONE) 20 MG TABLET    Take 3 tabs by mouth daily for 2 days, then 2 tabs daily for 2 days, then 1 tab daily for 2 days, then STOP. SENNA-DOCUSATE (PERICOLACE) 8.6-50 MG PER TABLET    Take 2 Tabs by mouth nightly. HOLD for loose stool. SOLIFENACIN (VESICARE) 5 MG TABLET    Take 1 Tab by mouth daily. TIOTROPIUM (SPIRIVA) 18 MCG INHALATION CAPSULE    Take 1 Cap by inhalation daily. These Medications have changed    No medications on file   Stop Taking    No medications on file         Cherie [address] for and in the presence of Alex Lacey MD  03/02/17.     Signed by: Cherie Gibson 03/02/17, 4:25 AM    Physician Attestation  I personally performed the services described in the documentation, reviewed the documentation, as recorded by the scribe in my presence, and it accurately and completely records my words and actions.     Sathish Coleman MD 03/02/17

## 2017-03-02 NOTE — ED NOTES
Bipap removed at this time per request of  admitting provider Dr Haily Venegas at bedside. Provider asked by pt for pain medication.

## 2017-03-02 NOTE — ED NOTES
Pt states the she would like to leave and discontinue all treatment.  Dr Rosaura Ceron paged at this time, pts IV's removed per pt request

## 2017-03-02 NOTE — ED TRIAGE NOTES
Pt arrived by EMS for respiratory distress. EMS gave 1 duo neb, 1 albuterol, 125 mg solumedrol. Pt SPO2 was 90% on 2 L O2 per EMS. Pt has labored, increased work of breathing on CPAP.   Pt placed on BIPAP

## 2017-03-03 ENCOUNTER — APPOINTMENT (OUTPATIENT)
Dept: GENERAL RADIOLOGY | Age: 52
DRG: 140 | End: 2017-03-03
Attending: EMERGENCY MEDICINE
Payer: MEDICAID

## 2017-03-03 ENCOUNTER — HOSPITAL ENCOUNTER (INPATIENT)
Age: 52
LOS: 6 days | Discharge: HOME | DRG: 140 | End: 2017-03-09
Attending: EMERGENCY MEDICINE | Admitting: INTERNAL MEDICINE
Payer: MEDICAID

## 2017-03-03 DIAGNOSIS — J44.1 COPD WITH ACUTE EXACERBATION (HCC): Primary | ICD-10-CM

## 2017-03-03 LAB
AMPHET UR QL SCN: NEGATIVE
ANION GAP BLD CALC-SCNC: 8 MMOL/L (ref 3–18)
ARTERIAL PATENCY WRIST A: YES
ATRIAL RATE: 82 BPM
BARBITURATES UR QL SCN: NEGATIVE
BASE DEFICIT BLD-SCNC: 2 MMOL/L
BASOPHILS # BLD AUTO: 0 K/UL (ref 0–0.1)
BASOPHILS # BLD: 0 % (ref 0–2)
BDY SITE: ABNORMAL
BENZODIAZ UR QL: NEGATIVE
BNP SERPL-MCNC: 181 PG/ML (ref 0–900)
BODY TEMPERATURE: 37
BUN SERPL-MCNC: 39 MG/DL (ref 7–18)
BUN/CREAT SERPL: 23 (ref 12–20)
CALCIUM SERPL-MCNC: 8.4 MG/DL (ref 8.5–10.1)
CALCULATED P AXIS, ECG09: 59 DEGREES
CALCULATED R AXIS, ECG10: 30 DEGREES
CALCULATED T AXIS, ECG11: 57 DEGREES
CANNABINOIDS UR QL SCN: NEGATIVE
CHLORIDE SERPL-SCNC: 102 MMOL/L (ref 100–108)
CO2 SERPL-SCNC: 29 MMOL/L (ref 21–32)
COCAINE UR QL SCN: NEGATIVE
CREAT SERPL-MCNC: 1.68 MG/DL (ref 0.6–1.3)
DIAGNOSIS, 93000: NORMAL
DIFFERENTIAL METHOD BLD: ABNORMAL
EOSINOPHIL # BLD: 0.1 K/UL (ref 0–0.4)
EOSINOPHIL NFR BLD: 1 % (ref 0–5)
ERYTHROCYTE [DISTWIDTH] IN BLOOD BY AUTOMATED COUNT: 13.4 % (ref 11.6–14.5)
GAS FLOW.O2 O2 DELIVERY SYS: ABNORMAL L/MIN
GLUCOSE BLD STRIP.AUTO-MCNC: 126 MG/DL (ref 70–110)
GLUCOSE BLD STRIP.AUTO-MCNC: 165 MG/DL (ref 70–110)
GLUCOSE SERPL-MCNC: 96 MG/DL (ref 74–99)
HCO3 BLD-SCNC: 24.5 MMOL/L (ref 22–26)
HCT VFR BLD AUTO: 46.1 % (ref 35–45)
HDSCOM,HDSCOM: ABNORMAL
HGB BLD-MCNC: 15 G/DL (ref 12–16)
LYMPHOCYTES # BLD AUTO: 13 % (ref 21–52)
LYMPHOCYTES # BLD: 1.6 K/UL (ref 0.9–3.6)
MCH RBC QN AUTO: 28.7 PG (ref 24–34)
MCHC RBC AUTO-ENTMCNC: 32.5 G/DL (ref 31–37)
MCV RBC AUTO: 88.3 FL (ref 74–97)
METHADONE UR QL: NEGATIVE
MONOCYTES # BLD: 0.8 K/UL (ref 0.05–1.2)
MONOCYTES NFR BLD AUTO: 7 % (ref 3–10)
NEUTS SEG # BLD: 9.4 K/UL (ref 1.8–8)
NEUTS SEG NFR BLD AUTO: 79 % (ref 40–73)
O2/TOTAL GAS SETTING VFR VENT: 21 %
OPIATES UR QL: POSITIVE
P-R INTERVAL, ECG05: 114 MS
PCO2 BLD: 46.4 MMHG (ref 35–45)
PCP UR QL: NEGATIVE
PH BLD: 7.33 [PH] (ref 7.35–7.45)
PLATELET # BLD AUTO: 235 K/UL (ref 135–420)
PMV BLD AUTO: 11.5 FL (ref 9.2–11.8)
PO2 BLD: 69 MMHG (ref 80–100)
POTASSIUM SERPL-SCNC: 4 MMOL/L (ref 3.5–5.5)
Q-T INTERVAL, ECG07: 374 MS
QRS DURATION, ECG06: 80 MS
QTC CALCULATION (BEZET), ECG08: 436 MS
RBC # BLD AUTO: 5.22 M/UL (ref 4.2–5.3)
SAO2 % BLD: 92 % (ref 92–97)
SERVICE CMNT-IMP: ABNORMAL
SODIUM SERPL-SCNC: 139 MMOL/L (ref 136–145)
SPECIMEN TYPE: ABNORMAL
TOTAL RESP. RATE, ITRR: 20
VENTRICULAR RATE, ECG03: 82 BPM
WBC # BLD AUTO: 11.9 K/UL (ref 4.6–13.2)

## 2017-03-03 RX ORDER — FLUOXETINE HYDROCHLORIDE 20 MG/1
60 CAPSULE ORAL DAILY
Status: DISCONTINUED | OUTPATIENT
Start: 2017-03-03 | End: 2017-03-09 | Stop reason: HOSPADM

## 2017-03-03 RX ORDER — AMLODIPINE BESYLATE 5 MG/1
5 TABLET ORAL DAILY
Status: DISCONTINUED | OUTPATIENT
Start: 2017-03-03 | End: 2017-03-03

## 2017-03-03 RX ORDER — BUDESONIDE 0.5 MG/2ML
500 INHALANT ORAL
Status: DISCONTINUED | OUTPATIENT
Start: 2017-03-03 | End: 2017-03-09 | Stop reason: HOSPADM

## 2017-03-03 RX ORDER — ACETAMINOPHEN 325 MG/1
650 TABLET ORAL
Status: DISCONTINUED | OUTPATIENT
Start: 2017-03-03 | End: 2017-03-09 | Stop reason: HOSPADM

## 2017-03-03 RX ORDER — IPRATROPIUM BROMIDE AND ALBUTEROL SULFATE 2.5; .5 MG/3ML; MG/3ML
3 SOLUTION RESPIRATORY (INHALATION)
Status: COMPLETED | OUTPATIENT
Start: 2017-03-03 | End: 2017-03-03

## 2017-03-03 RX ORDER — HYDROMORPHONE HYDROCHLORIDE 1 MG/ML
0.5 INJECTION, SOLUTION INTRAMUSCULAR; INTRAVENOUS; SUBCUTANEOUS
Status: COMPLETED | OUTPATIENT
Start: 2017-03-03 | End: 2017-03-03

## 2017-03-03 RX ORDER — ARFORMOTEROL TARTRATE 15 UG/2ML
15 SOLUTION RESPIRATORY (INHALATION)
Status: DISCONTINUED | OUTPATIENT
Start: 2017-03-03 | End: 2017-03-09 | Stop reason: HOSPADM

## 2017-03-03 RX ORDER — MAGNESIUM SULFATE 100 %
4 CRYSTALS MISCELLANEOUS AS NEEDED
Status: DISCONTINUED | OUTPATIENT
Start: 2017-03-03 | End: 2017-03-09 | Stop reason: HOSPADM

## 2017-03-03 RX ORDER — ALPRAZOLAM 1 MG/1
1 TABLET ORAL 2 TIMES DAILY
Status: DISCONTINUED | OUTPATIENT
Start: 2017-03-03 | End: 2017-03-09 | Stop reason: HOSPADM

## 2017-03-03 RX ORDER — IPRATROPIUM BROMIDE AND ALBUTEROL SULFATE 2.5; .5 MG/3ML; MG/3ML
3 SOLUTION RESPIRATORY (INHALATION)
Status: DISCONTINUED | OUTPATIENT
Start: 2017-03-03 | End: 2017-03-03

## 2017-03-03 RX ORDER — AMLODIPINE BESYLATE 5 MG/1
5 TABLET ORAL ONCE
Status: COMPLETED | OUTPATIENT
Start: 2017-03-03 | End: 2017-03-03

## 2017-03-03 RX ORDER — ALBUTEROL SULFATE 0.83 MG/ML
2.5 SOLUTION RESPIRATORY (INHALATION)
Status: COMPLETED | OUTPATIENT
Start: 2017-03-03 | End: 2017-03-03

## 2017-03-03 RX ORDER — ALBUTEROL SULFATE 0.83 MG/ML
2.5 SOLUTION RESPIRATORY (INHALATION)
Status: DISCONTINUED | OUTPATIENT
Start: 2017-03-03 | End: 2017-03-06

## 2017-03-03 RX ORDER — ALBUTEROL SULFATE 0.83 MG/ML
2.5 SOLUTION RESPIRATORY (INHALATION)
Status: DISPENSED | OUTPATIENT
Start: 2017-03-03 | End: 2017-03-03

## 2017-03-03 RX ORDER — MAGNESIUM SULFATE HEPTAHYDRATE 500 MG/ML
2 INJECTION, SOLUTION INTRAMUSCULAR; INTRAVENOUS
Status: DISCONTINUED | OUTPATIENT
Start: 2017-03-03 | End: 2017-03-03 | Stop reason: SDUPTHER

## 2017-03-03 RX ORDER — OXYCODONE AND ACETAMINOPHEN 5; 325 MG/1; MG/1
1-2 TABLET ORAL
Status: DISCONTINUED | OUTPATIENT
Start: 2017-03-03 | End: 2017-03-09 | Stop reason: HOSPADM

## 2017-03-03 RX ORDER — INSULIN LISPRO 100 [IU]/ML
INJECTION, SOLUTION INTRAVENOUS; SUBCUTANEOUS
Status: DISCONTINUED | OUTPATIENT
Start: 2017-03-03 | End: 2017-03-09 | Stop reason: HOSPADM

## 2017-03-03 RX ORDER — BUMETANIDE 0.5 MG/1
0.5 TABLET ORAL
Status: DISCONTINUED | OUTPATIENT
Start: 2017-03-03 | End: 2017-03-09 | Stop reason: HOSPADM

## 2017-03-03 RX ORDER — HEPARIN SODIUM 5000 [USP'U]/ML
5000 INJECTION, SOLUTION INTRAVENOUS; SUBCUTANEOUS EVERY 8 HOURS
Status: DISCONTINUED | OUTPATIENT
Start: 2017-03-03 | End: 2017-03-09 | Stop reason: HOSPADM

## 2017-03-03 RX ORDER — MAGNESIUM SULFATE HEPTAHYDRATE 40 MG/ML
2 INJECTION, SOLUTION INTRAVENOUS ONCE
Status: COMPLETED | OUTPATIENT
Start: 2017-03-03 | End: 2017-03-03

## 2017-03-03 RX ORDER — AMLODIPINE BESYLATE 10 MG/1
10 TABLET ORAL DAILY
Status: DISCONTINUED | OUTPATIENT
Start: 2017-03-04 | End: 2017-03-09 | Stop reason: HOSPADM

## 2017-03-03 RX ORDER — FAMOTIDINE 20 MG/1
20 TABLET, FILM COATED ORAL DAILY
Status: DISCONTINUED | OUTPATIENT
Start: 2017-03-03 | End: 2017-03-09 | Stop reason: HOSPADM

## 2017-03-03 RX ORDER — DEXTROSE 50 % IN WATER (D50W) INTRAVENOUS SYRINGE
25-50 AS NEEDED
Status: DISCONTINUED | OUTPATIENT
Start: 2017-03-03 | End: 2017-03-09 | Stop reason: HOSPADM

## 2017-03-03 RX ADMIN — AMLODIPINE BESYLATE 5 MG: 5 TABLET ORAL at 18:51

## 2017-03-03 RX ADMIN — OXYCODONE HYDROCHLORIDE AND ACETAMINOPHEN 2 TABLET: 5; 325 TABLET ORAL at 15:47

## 2017-03-03 RX ADMIN — OXYCODONE HYDROCHLORIDE AND ACETAMINOPHEN 2 TABLET: 5; 325 TABLET ORAL at 22:22

## 2017-03-03 RX ADMIN — FLUOXETINE 60 MG: 20 CAPSULE ORAL at 11:25

## 2017-03-03 RX ADMIN — FAMOTIDINE 20 MG: 20 TABLET ORAL at 11:26

## 2017-03-03 RX ADMIN — ALBUTEROL SULFATE 2.5 MG: 2.5 SOLUTION RESPIRATORY (INHALATION) at 04:12

## 2017-03-03 RX ADMIN — ALPRAZOLAM 1 MG: 1 TABLET ORAL at 18:58

## 2017-03-03 RX ADMIN — ALBUTEROL SULFATE 2.5 MG: 2.5 SOLUTION RESPIRATORY (INHALATION) at 16:42

## 2017-03-03 RX ADMIN — BUMETANIDE 0.5 MG: 0.5 TABLET ORAL at 11:28

## 2017-03-03 RX ADMIN — METHYLPREDNISOLONE SODIUM SUCCINATE 60 MG: 125 INJECTION, POWDER, FOR SOLUTION INTRAMUSCULAR; INTRAVENOUS at 18:58

## 2017-03-03 RX ADMIN — AMLODIPINE BESYLATE 5 MG: 5 TABLET ORAL at 11:25

## 2017-03-03 RX ADMIN — METHYLPREDNISOLONE SODIUM SUCCINATE 125 MG: 125 INJECTION, POWDER, FOR SOLUTION INTRAMUSCULAR; INTRAVENOUS at 05:14

## 2017-03-03 RX ADMIN — IPRATROPIUM BROMIDE AND ALBUTEROL SULFATE 3 ML: .5; 3 SOLUTION RESPIRATORY (INHALATION) at 04:12

## 2017-03-03 RX ADMIN — ALBUTEROL SULFATE 2.5 MG: 2.5 SOLUTION RESPIRATORY (INHALATION) at 11:58

## 2017-03-03 RX ADMIN — HYDROMORPHONE HYDROCHLORIDE 0.5 MG: 1 INJECTION, SOLUTION INTRAMUSCULAR; INTRAVENOUS; SUBCUTANEOUS at 05:14

## 2017-03-03 RX ADMIN — MAGNESIUM SULFATE HEPTAHYDRATE 2 G: 40 INJECTION, SOLUTION INTRAVENOUS at 05:35

## 2017-03-03 RX ADMIN — ALPRAZOLAM 1 MG: 1 TABLET ORAL at 11:28

## 2017-03-03 RX ADMIN — BUDESONIDE 500 MCG: 0.5 INHALANT RESPIRATORY (INHALATION) at 11:58

## 2017-03-03 RX ADMIN — ALBUTEROL SULFATE 2.5 MG: 2.5 SOLUTION RESPIRATORY (INHALATION) at 05:21

## 2017-03-03 RX ADMIN — ALBUTEROL SULFATE 2.5 MG: 2.5 SOLUTION RESPIRATORY (INHALATION) at 07:28

## 2017-03-03 RX ADMIN — METHYLPREDNISOLONE SODIUM SUCCINATE 60 MG: 125 INJECTION, POWDER, FOR SOLUTION INTRAMUSCULAR; INTRAVENOUS at 11:44

## 2017-03-03 RX ADMIN — ALBUTEROL SULFATE 2.5 MG: 2.5 SOLUTION RESPIRATORY (INHALATION) at 05:10

## 2017-03-03 RX ADMIN — BUDESONIDE 500 MCG: 0.5 INHALANT RESPIRATORY (INHALATION) at 20:00

## 2017-03-03 NOTE — PROGRESS NOTES
conducted a Follow up consultation and Spiritual Assessment for Alonso Tello, who is a 45 y.o.,female. The  provided the following Interventions:  Continued the relationship of care and support. Chart reviewed. The following outcomes were achieved:  Patient expressed gratitude for 's visit. Assessment:  There are no further spiritual or Denominational issues which require Spiritual Care Services interventions at this time. Plan:  Chaplains will continue to follow and will provide pastoral care on an as needed/requested basis.  recommends bedside caregivers page  on duty if patient shows signs of acute spiritual or emotional distress.      [address] [facility] Certified [address] [facility]   [phone]

## 2017-03-03 NOTE — CONSULTS
[facility]. 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 0    Status asthmaticus with COPD (chronic obstructive pulmonary disease) ([facility]) J44.9, J45.902    Abnormal CT of the chest R93.8    Shortness of breath R06.02    Asthma J45.909    COPD exacerbation ([facility]) J44.1    Acute on chronic respiratory failure with hypoxemia ([facility]) J96.21    COPD with exacerbation ([facility]) J44.1    Acute exacerbation of chronic obstructive pulmonary disease (COPD) ([facility]) J44.1    Respiratory failure ([facility]) J96.90    Acute encephalopathy G93.40    Asthma exacerbation J45. 901    Acute respiratory failure with hypercapnia ([facility]) J96.02    COPD with acute exacerbation ([facility]) J44.1      RECOMMENDATIONS:   · Continue IV solumedrol 60 mg IV Q6H  · Continue Brovana and Pulmicort. · Albuterol PRN  Continue Singulair  · Will resume home regimen- Spiriva, Symbicort.   · Continue BiPAP  · Hallway oxymetry when patient can walk, oxygen saturation goal >88%  · Judicious anxiolytics  · Needs asthma/COPD education and action plan as well as outpatient interventions- high risk readmissions and chronic complex disease management  · Prophylaxis: DVT and PUD  · Further recommendations will be based on the patient's response to recommended treatment and results of the investigation ordered. Subjective: This patient has been seen and evaluated at the request of Dr. Adina Prasad for  Acute exacerbation of Asthma. Patient is a 45 y.o. female came to ER yesterday after recent discharge from [facility] and left AMA. The patient states that she got home and very quickly she started feeling worse and paramedics were called. The  patient denies smoking herself, but states that she may have been exposed to secondhand smoke at home and \" bad air\". The patient has had various admissions to various facilities with respiratory distress, during a recent admission to [facility], the patient was intubated. On arrival to the ER, the patient has been placed on BiPAP. The patient states that her breathing has been getting worse for the last few weeks. The patient complains of cough, but does not bring out any phlegm. The patient denies any chest pain, any palpitations, any nausea or vomiting. No history of any  fever or chills. No history of any headaches, numbness, or focal weakness. No history of any abdominal pain, urinary complaints, diarrhea or rectal bleeding. No history of any leg swelling. No history of any rash. The patient denies any illicit drug use. Past Medical History:   Diagnosis Date    Asthma     Chronic obstructive pulmonary disease ([address]     Endocrine disease     thyroid issues    Gastrointestinal disorder     \"blockage in my stomach\"    Hypertension       No past surgical history on file. Prior to Admission medications    Medication Sig Start Date End Date Taking? Authorizing Provider   budesonide-formoterol (SYMBICORT) 160-4.5 mcg/actuation HFA inhaler Take 2 Puffs by inhalation two (2) times a day. 2/28/17   José Salazar MD   montelukast (SINGULAIR) 10 mg tablet Take 1 Tab by mouth nightly. 2/28/17   José Salazar MD   oxyCODONE-acetaminophen (PERCOCET 10)  mg per tablet Take 1 Tab by mouth every four (4) hours as needed. Max Daily Amount: 6 Tabs. 2/28/17   Angeles Balderas MD   tiotropium [facility]) 18 mcg inhalation capsule Take 1 Cap by inhalation daily. 2/28/17   Angeles Balderas MD   doxycycline (VIBRAMYCIN) 100 mg capsule Take 1 Cap by mouth every twelve (12) hours for 5 days. 2/28/17 3/5/17  Angeles Balderas MD   predniSONE (DELTASONE) 20 mg tablet Take 3 tabs by mouth daily for 2 days, then 2 tabs daily for 2 days, then 1 tab daily for 2 days, then STOP. 2/28/17   Angeles Balderas MD   ALPRAZolam Viky Doll) 0.5 mg tablet Take 5 Tabs by mouth two (2) times a day. Max Daily Amount: 5 mg. This information was told to current writer by patient. Patient fills script at [facility] on [employer] Patient taking differently: Take 1 mg by mouth two (2) times a day. This information was told to current writer by patient. Patient fills script at [facility] on [employer] 1/30/17   Janny Ferreira MD   budesonide-formoterol (SYMBICORT) 160-4.5 mcg/actuation HFA inhaler Take 2 Puffs by inhalation two (2) times a day. 1/30/17   Janny Ferreira MD   albuterol (VENTOLIN HFA) 90 mcg/actuation inhaler Take 2 Puffs by inhalation every six (6) hours as needed for Wheezing or Shortness of Breath. 1/30/17   Janny Ferreira MD   bumetanide (BUMEX) 0.5 mg tablet Take 1 Tab by mouth every fourty-eight (48) hours. 1/14/17   Danny Keller MD   lisinopril (PRINIVIL, ZESTRIL) 20 mg tablet Take 1 Tab by mouth daily. 5/19/16   Danny Keller MD   amLODIPine (NORVASC) 5 mg tablet Take 1 Tab by mouth daily. 5/19/16   Danny Keller MD   famotidine (PEPCID) 20 mg tablet Take 1 Tab by mouth daily. 11/20/15   Matthew Solorio MD   senna-docusate (PERICOLACE) 8.6-50 mg per tablet Take 2 Tabs by mouth nightly. HOLD for loose stool. 11/20/15   Matthew Solorio MD   FLUoxetine (PROZAC) 20 mg capsule Take 3 Caps by mouth daily. 11/20/15   Matthew Solorio MD   solifenacin (VESICARE) 5 mg tablet Take 1 Tab by mouth daily.  11/20/15   Matthew Solorio MD     No Known Allergies   Social History Substance Use Topics    Smoking status: Never Smoker    Smokeless tobacco: Never Used    Alcohol use No      Comment: socially      No family history on file. Current Facility-Administered Medications   Medication Dose Route Frequency    ALPRAZolam (XANAX) tablet 1 mg  1 mg Oral BID    amLODIPine (NORVASC) tablet 5 mg  5 mg Oral DAILY    bumetanide (BUMEX) tablet 0.5 mg  0.5 mg Oral Q48H    famotidine (PEPCID) tablet 20 mg  20 mg Oral DAILY    FLUoxetine (PROzac) capsule 60 mg  60 mg Oral DAILY    heparin (porcine) injection 5,000 Units  5,000 Units SubCUTAneous Q8H    albuterol-ipratropium (DUO-NEB) 2.5 MG-0.5 MG/3 ML  3 mL Nebulization Q4H RT    methylPREDNISolone (PF) (SOLU-MEDROL) injection 125 mg  125 mg IntraVENous Q8H       Review of Systems:  A comprehensive review of systems was negative except for that written in the HPI. Objective:   Vital Signs:    Visit Vitals    /63    Pulse 77    Temp 98.3 °F (36.8 °C)    Resp 12    SpO2 94%       O2 Device: Room air       Temp (24hrs), Av.3 °F (36.8 °C), Min:98.3 °F (36.8 °C), Max:98.3 °F (36.8 °C)       Intake/Output:   Last shift:         Last 3 shifts:    No intake or output data in the 24 hours ending 17 0851   Physical Exam:   General:  Alert, cooperative, no distress, appears stated age. Head:  Normocephalic, without obvious abnormality, atraumatic. Eyes:  Conjunctivae/corneas clear. PERRL, ANicteric   Nose: Nares normal. Mucosa normal. No drainage or sinus tenderness. Throat: Lips, mucosa dry. NO thrush;    Neck: Supple, symmetrical, trachea midline, no adenopathy, thyroid: no enlargment/tenderness/nodules, no carotid bruit and no JVD. No crepitus   Back:   Symmetric, no curvature, no spine tenderness or flank pain   Lungs:   Bilateral auscultation - decreased breath sounds with exp wheezing   Chest wall:  No tenderness or deformity.  NO CREPITUS   Heart:  Regular rate and rhythm, S1, S2 normal, no murmur, click, rub or gallop. Abdomen:   Soft, non-tender. PROTUBERANT; Bowel sounds normal. No masses,  No organomegaly. No paradox   Extremities: normal, atraumatic, no cyanosis or edema. Pulses: 1-2+ and symmetric all extremities. Skin: Skin color, texture, turgor normal. No rashes or lesions, scratch and itch marks   Lymph nodes: Cervical, supraclavicular, and axillary nodes normal.   Neurologic: Grossly nonfocal         Data review:   Labs:  Recent Results (from the past 24 hour(s))   CBC WITH AUTOMATED DIFF    Collection Time: 03/03/17  5:05 AM   Result Value Ref Range    WBC 11.9 4.6 - 13.2 K/uL    RBC 5.22 4.20 - 5.30 M/uL    HGB 15.0 12.0 - 16.0 g/dL    HCT 46.1 (H) 35.0 - 45.0 %    MCV 88.3 74.0 - 97.0 FL    MCH 28.7 24.0 - 34.0 PG    MCHC 32.5 31.0 - 37.0 g/dL    RDW 13.4 11.6 - 14.5 %    PLATELET 471 762 - 982 K/uL    MPV 11.5 9.2 - 11.8 FL    NEUTROPHILS 79 (H) 40 - 73 %    LYMPHOCYTES 13 (L) 21 - 52 %    MONOCYTES 7 3 - 10 %    EOSINOPHILS 1 0 - 5 %    BASOPHILS 0 0 - 2 %    ABS. NEUTROPHILS 9.4 (H) 1.8 - 8.0 K/UL    ABS. LYMPHOCYTES 1.6 0.9 - 3.6 K/UL    ABS. MONOCYTES 0.8 0.05 - 1.2 K/UL    ABS. EOSINOPHILS 0.1 0.0 - 0.4 K/UL    ABS.  BASOPHILS 0.0 0.0 - 0.1 K/UL    DF AUTOMATED     PRO-BNP    Collection Time: 03/03/17  5:05 AM   Result Value Ref Range    NT pro- 0 - 721 PG/ML   METABOLIC PANEL, BASIC    Collection Time: 03/03/17  5:05 AM   Result Value Ref Range    Sodium 139 136 - 145 mmol/L    Potassium 4.0 3.5 - 5.5 mmol/L    Chloride 102 100 - 108 mmol/L    CO2 29 21 - 32 mmol/L    Anion gap 8 3.0 - 18 mmol/L    Glucose 96 74 - 99 mg/dL    BUN 39 (H) 7.0 - 18 MG/DL    Creatinine 1.68 (H) 0.6 - 1.3 MG/DL    BUN/Creatinine ratio 23 (H) 12 - 20      GFR est AA 39 (L) >60 ml/min/1.73m2    GFR est non-AA 32 (L) >60 ml/min/1.73m2    Calcium 8.4 (L) 8.5 - 10.1 MG/DL   POC G3    Collection Time: 03/03/17  5:44 AM   Result Value Ref Range    Device: ROOM AIR      FIO2 (POC) 21 %    pH (POC) 7.330 (L) 7.35 - 7.45      pCO2 (POC) 46.4 (H) 35.0 - 45.0 MMHG    pO2 (POC) 69 (L) 80 - 100 MMHG    HCO3 (POC) 24.5 22 - 26 MMOL/L    sO2 (POC) 92 92 - 97 %    Base deficit (POC) 2 mmol/L    Allens test (POC) YES      Total resp. rate 20      Site LEFT RADIAL      Patient temp. 37.0      Specimen type (POC) ARTERIAL      Performed by [id number] [facility] [facility], URINE    Collection Time: 03/03/17  7:02 AM   Result Value Ref Range    BENZODIAZEPINE NEGATIVE  NEG      BARBITURATES NEGATIVE  NEG      THC (TH-CANNABINOL) NEGATIVE  NEG      OPIATES POSITIVE (A) NEG      PCP(PHENCYCLIDINE) NEGATIVE  NEG      COCAINE NEGATIVE  NEG      AMPHETAMINE NEGATIVE  NEG      METHADONE NEGATIVE  NEG      HDSCOM (NOTE)        Imaging:  I have personally reviewed the patients radiographs and have reviewed the reports:  CXR Results  (Last 48 hours)               03/02/17 0450  XR CHEST PORT Final result    Impression:  IMPRESSION: Similar congestion and minimal left lung base haziness, may   represent atelectasis. Narrative:  INDICATION:  Dyspnea. COMPARISON:  2/28/2017       FINDINGS: A portable AP radiograph of the chest was obtained at 0450 hours. The   patient is on a cardiac monitor. Cardiac silhouette is stable. Similar mild   prominence of the pulmonary vasculature. Similar minimal left lung base   haziness, may represent atelectasis. No acute osseous abnormality.                     High complexity decision making was performed in this consultation and evaluation of this patient who is at high risk for decompensation with multiple organ involvement       Ham Hinton MD

## 2017-03-03 NOTE — PROGRESS NOTES
conducted an initial consultation and Spiritual Assessment for Lala Alarcon, who is a 45 y.o.,female. Patients Primary Language is: [place]. According to the patients EMR Episcopal Affiliation is: Dg.     The reason the Patient came to the hospital is:   Patient Active Problem List    Diagnosis Date Noted    COPD with acute exacerbation ([facility] [address] 03/02/2017    Acute respiratory failure with hypercapnia ([address] 02/26/2017    Asthma exacerbation 01/26/2017    Respiratory failure ([address] 01/11/2017    Acute encephalopathy 01/11/2017    Acute exacerbation of chronic obstructive pulmonary disease (COPD) ([address] 08/29/2016    COPD with exacerbation ([address] 07/11/2016    COPD exacerbation ([address] 03/12/2016    Acute on chronic respiratory failure with hypoxemia (HCC) 03/12/2016    Shortness of breath 03/05/2016    Asthma 03/05/2016    Abnormal CT of the chest 11/20/2015    Asthma exacerbation attacks 10/19/2015    Status asthmaticus with COPD (chronic obstructive pulmonary disease) ([address] 10/19/2015        The  provided the following Interventions:  Initiated a relationship of care and support. Listened empathically as patient shared her story. Provided chaplaincy education. Provided information about Spiritual Care Services. Offered prayer and assurance of continued prayers on patient's behalf. Chart reviewed. The following outcomes where achieved:  Patient shared limited information about her medical narrative. Patient processed feeling about current hospitalization. Patient shared that she only had on a skirt which she pulled up as a dress; she needed something to wear.  able to locate a sweater and a pant from the clothes closet for the patient. Patient expressed gratitude for 's visit. Plan:  Chaplains will continue to follow and will provide pastoral care on an as needed/requested basis.    recommends bedside caregivers page  on duty if patient shows signs of acute spiritual or emotional distress.       Jacky Clayton  [address] [place] [address]  [phone]

## 2017-03-03 NOTE — ED TRIAGE NOTES
PT presented to by EMS c/o SOB. Pt stated \" I just was here i just left yesterday for the same thing. I dont  know why I keep having these attacks. Pt a/o x 4 follows commands. SOB at rest and wheezing noted. Will conitunue to monitor.

## 2017-03-03 NOTE — PROGRESS NOTES
[facility]  Progress Note    Patient: Jayne Sharpe Age: 45 y.o. : 1965 MR#: [id number] SSN: [id number]  Date/Time: 3/3/2017 3:17 PM    Subjective/24-hour events:     Major complaint is that of pain. Audible wheezing persists. Assessment:   Severe persistent asthma with acute exacerbation  Chronic pain issues with ongoing prescription narcotic use  Morbid obesity    Plan:  IV/inhaled steroid, BD, singulair. Pulmonology assistance appreciated. BPs elevated - increase norvasc and monitor. Have resumed percocet as previously prescribed, but will not give any narcotic medications at discharge. This has been explained to her and she is understanding. No IV meds while here. Will arrange PCP f/u prior to discharge. Case discussed with:  [x]Patient  []Family  [x]Nursing  []Case Management  DVT Prophylaxis:  []Lovenox  []Hep SQ  []SCDs  []Coumadin   []On Heparin gtt    Objective:   VS:   Visit Vitals    BP (!) 178/92 (BP 1 Location: Right arm, BP Patient Position: At rest;Supine)    Pulse 88    Temp 97.5 °F (36.4 °C)    Resp 21    SpO2 99%    Breastfeeding No      Tmax/24hrs: Temp (24hrs), Av °F (36.7 °C), Min:97.5 °F (36.4 °C), Max:98.3 °F (36.8 °C)  No intake or output data in the 24 hours ending 17 1517    General:  In NAD. Cardiovascular: RRR. Pulmonary:  Diffuse wheezing bilaterally. GI:  Abdomen soft, NTTP. Extremities:  Warm, no ischemia. Neuro:  Awake and alert.   Motor nonfocal.    Labs:    Recent Results (from the past 24 hour(s))   CBC WITH AUTOMATED DIFF    Collection Time: 17  5:05 AM   Result Value Ref Range    WBC 11.9 4.6 - 13.2 K/uL    RBC 5.22 4.20 - 5.30 M/uL    HGB 15.0 12.0 - 16.0 g/dL    HCT 46.1 (H) 35.0 - 45.0 %    MCV 88.3 74.0 - 97.0 FL    MCH 28.7 24.0 - 34.0 PG    MCHC 32.5 31.0 - 37.0 g/dL    RDW 13.4 11.6 - 14.5 %    PLATELET 534 969 - 758 K/uL    MPV 11.5 9.2 - 11.8 FL    NEUTROPHILS 79 (H) 40 - 73 % LYMPHOCYTES 13 (L) 21 - 52 %    MONOCYTES 7 3 - 10 %    EOSINOPHILS 1 0 - 5 %    BASOPHILS 0 0 - 2 %    ABS. NEUTROPHILS 9.4 (H) 1.8 - 8.0 K/UL    ABS. LYMPHOCYTES 1.6 0.9 - 3.6 K/UL    ABS. MONOCYTES 0.8 0.05 - 1.2 K/UL    ABS. EOSINOPHILS 0.1 0.0 - 0.4 K/UL    ABS. BASOPHILS 0.0 0.0 - 0.1 K/UL    DF AUTOMATED     PRO-BNP    Collection Time: 03/03/17  5:05 AM   Result Value Ref Range    NT pro- 0 - 951 PG/ML   METABOLIC PANEL, BASIC    Collection Time: 03/03/17  5:05 AM   Result Value Ref Range    Sodium 139 136 - 145 mmol/L    Potassium 4.0 3.5 - 5.5 mmol/L    Chloride 102 100 - 108 mmol/L    CO2 29 21 - 32 mmol/L    Anion gap 8 3.0 - 18 mmol/L    Glucose 96 74 - 99 mg/dL    BUN 39 (H) 7.0 - 18 MG/DL    Creatinine 1.68 (H) 0.6 - 1.3 MG/DL    BUN/Creatinine ratio 23 (H) 12 - 20      GFR est AA 39 (L) >60 ml/min/1.73m2    GFR est non-AA 32 (L) >60 ml/min/1.73m2    Calcium 8.4 (L) 8.5 - 10.1 MG/DL   POC G3    Collection Time: 03/03/17  5:44 AM   Result Value Ref Range    Device: ROOM AIR      FIO2 (POC) 21 %    pH (POC) 7.330 (L) 7.35 - 7.45      pCO2 (POC) 46.4 (H) 35.0 - 45.0 MMHG    pO2 (POC) 69 (L) 80 - 100 MMHG    HCO3 (POC) 24.5 22 - 26 MMOL/L    sO2 (POC) 92 92 - 97 %    Base deficit (POC) 2 mmol/L    Allens test (POC) YES      Total resp. rate 20      Site LEFT RADIAL      Patient temp.  37.0      Specimen type (POC) ARTERIAL      [id number], URINE    Collection Time: 03/03/17  7:02 AM   Result Value Ref Range    BENZODIAZEPINE NEGATIVE  NEG      BARBITURATES NEGATIVE  NEG      THC (TH-CANNABINOL) NEGATIVE  NEG      OPIATES POSITIVE (A) NEG      PCP(PHENCYCLIDINE) NEGATIVE  NEG      COCAINE NEGATIVE  NEG      AMPHETAMINE NEGATIVE  NEG      METHADONE NEGATIVE  NEG      HDSCOM (NOTE)        Signed By: Parul Nava MD     March 3, 2017 3:17 PM

## 2017-03-03 NOTE — ED NOTES
Bedside and Verbal shift change report given to [address] (oncoming nurse) by Gio Wade RN (offgoing nurse). Report included the following information SBAR, Kardex, ED Summary, Procedure Summary, Intake/Output, MAR and Recent Results.

## 2017-03-03 NOTE — PROGRESS NOTES
03/03/17 1157   Patient Observation   Observations pt having breathing trouble. Respiratory therapist on the way.

## 2017-03-03 NOTE — IP AVS SNAPSHOT
[facility]bra [facility] 
 
 
 [address] Patient: Cj Ngo MRN: [id number] :1965 You are allergic to the following No active allergies Recent Documentation Weight Breastfeeding? BMI Smoking Status  
  
  
 119.7 kg No 53.28 kg/m2 Never Smoker Emergency Contacts Name Discharge Info Relation Home Work Mobile Monica Denis  Daughter [21] [phone] Kya Ramírez  Daughter [21] [phone] Danial Waddell [5] [phone] About your hospitalization You were admitted on:  March 3, 2017 You last received care in the:  [facility] 1981 You were discharged on:  2017 Unit phone number:  [phone] Why you were hospitalized Your primary diagnosis was:  Not on File Your diagnoses also included:  Copd Exacerbation (Hcc) Providers Seen During Your Hospitalizations Provider Role Specialty Primary office phone Anibal Jackson MD Attending Provider Emergency Medicine [phone] Derek Conley MD Attending Provider Internal Medicine [phone] Your Primary Care Physician (PCP) Primary Care Physician Office Phone Office Fax OTHER, PHYS ** None ** ** None ** Follow-up Information None Your Appointments 2017  2:15 PM EDT Follow Up with Cira Bain MD  
[address] [place] [address] ([facility]) [address] [place] [address], [address] [place] [id number] [phone] Current Discharge Medication List  
  
START taking these medications Dose & Instructions Dispensing Information Comments Morning Noon Evening Bedtime  
 arformoterol 15 mcg/2 mL Nebu neb solution Commonly known as:  Kimberly Piper Your next dose is: Today, Tomorrow Other:  _________ Dose:  15 mcg 2 mL by Nebulization route two (2) times a day. Quantity:  60 Vial  
Refills:  1  
     
   
   
   
  
 budesonide 0.5 mg/2 mL Nbsp Commonly known as:  PULMICORT Your next dose is: Today, Tomorrow Other:  _________ Dose:  500 mcg 2 mL by Nebulization route two (2) times a day. Quantity:  60 Each Refills:  1  
     
   
   
   
  
 hydrALAZINE 50 mg tablet Commonly known as:  APRESOLINE Your next dose is: Today, Tomorrow Other:  _________ Dose:  50 mg Take 1 Tab by mouth three (3) times daily. Quantity:  90 Tab Refills:  1 CONTINUE these medications which have CHANGED Dose & Instructions Dispensing Information Comments Morning Noon Evening Bedtime * albuterol 90 mcg/actuation inhaler Commonly known as:  VENTOLIN HFA What changed:  Another medication with the same name was added. Make sure you understand how and when to take each. Your next dose is: Today, Tomorrow Other:  _________ Dose:  2 Puff Take 2 Puffs by inhalation every six (6) hours as needed for Wheezing or Shortness of Breath. Quantity:  1 Inhaler Refills:  1  
     
   
   
   
  
 * albuterol 2.5 mg /3 mL (0.083 %) nebulizer solution Commonly known as:  PROVENTIL VENTOLIN What changed: You were already taking a medication with the same name, and this prescription was added. Make sure you understand how and when to take each. Your next dose is: Today, Tomorrow Other:  _________ Dose:  2.5 mg  
3 mL by Nebulization route every four (4) hours as needed for Wheezing. Quantity:  60 Each Refills:  1 ALPRAZolam 1 mg tablet Commonly known as:  Arlene Willson What changed:   
- medication strength 
- how much to take 
- additional instructions Your next dose is: Today, Tomorrow Other:  _________ Dose:  1 mg Take 1 Tab by mouth two (2) times a day. Max Daily Amount: 2 mg. Quantity:  30 Tab Refills:  0  
     
   
   
   
  
 amLODIPine 10 mg tablet Commonly known as:  Chadwick Brian What changed:   
- medication strength 
- how much to take Your next dose is: Today, Tomorrow Other:  _________ Dose:  10 mg Take 1 Tab by mouth daily. Quantity:  30 Tab Refills:  1  
     
   
   
   
  
 budesonide-formoterol 160-4.5 mcg/actuation HFA inhaler Commonly known as:  SYMBICORT What changed:  Another medication with the same name was removed. Continue taking this medication, and follow the directions you see here. Your next dose is: Today, Tomorrow Other:  _________ Dose:  2 Puff Take 2 Puffs by inhalation two (2) times a day. Quantity:  1 Inhaler Refills:  1  
     
   
   
   
  
 predniSONE 20 mg tablet Commonly known as:  Kori Álvarez What changed:  additional instructions Your next dose is: Today, Tomorrow Other:  _________ Take 3 tabs by mouth daily for 4 days, then 2 tabs daily for 4 days, then 1 tab daily for 4 days, then STOP. Quantity:  24 Tab Refills:  0  
     
   
   
   
  
 * Notice: This list has 2 medication(s) that are the same as other medications prescribed for you. Read the directions carefully, and ask your doctor or other care provider to review them with you. CONTINUE these medications which have NOT CHANGED Dose & Instructions Dispensing Information Comments Morning Noon Evening Bedtime  
 bumetanide 0.5 mg tablet Commonly known as:  Anjana Pacheco Your next dose is: Today, Tomorrow Other:  _________ Dose:  0.5 mg Take 1 Tab by mouth every fourty-eight (48) hours. Quantity:  30 Tab Refills:  1  
     
   
   
   
  
 famotidine 20 mg tablet Commonly known as:  PEPCID Your next dose is: Today, Tomorrow Other:  _________  Dose:  20 mg  
 Take 1 Tab by mouth daily. Quantity:  30 Tab Refills:  1 FLUoxetine 20 mg capsule Commonly known as:  PROzac Your next dose is: Today, Tomorrow Other:  _________ Dose:  60 mg Take 3 Caps by mouth daily. Quantity:  90 Cap Refills:  1  
     
   
   
   
  
 montelukast 10 mg tablet Commonly known as:  SINGULAIR Your next dose is: Today, Tomorrow Other:  _________ Dose:  10 mg Take 1 Tab by mouth nightly. Quantity:  30 Tab Refills:  1  
     
   
   
   
  
 tiotropium 18 mcg inhalation capsule Commonly known as:  Marybelle Denver Your next dose is: Today, Tomorrow Other:  _________ Dose:  1 Cap Take 1 Cap by inhalation daily. Quantity:  30 Cap Refills:  1 STOP taking these medications   
 doxycycline 100 mg capsule Commonly known as:  VIBRAMYCIN  
   
  
 lisinopril 20 mg tablet Commonly known as:  PRINIVIL, ZESTRIL  
   
  
 oxyCODONE-acetaminophen  mg per tablet Commonly known as:  PERCOCET 10  
   
  
 senna-docusate 8.6-50 mg per tablet Commonly known as:  PERICOLACE  
   
  
 solifenacin 5 mg tablet Commonly known as:  VESIcare Where to Get Your Medications Information on where to get these meds will be given to you by the nurse or doctor. ! Ask your nurse or doctor about these medications  
  albuterol 2.5 mg /3 mL (0.083 %) nebulizer solution  
 albuterol 90 mcg/actuation inhaler ALPRAZolam 1 mg tablet  
 amLODIPine 10 mg tablet  
 arformoterol 15 mcg/2 mL Nebu neb solution  
 budesonide 0.5 mg/2 mL Nbsp  
 budesonide-formoterol 160-4.5 mcg/actuation HFA inhaler  
 bumetanide 0.5 mg tablet  
 famotidine 20 mg tablet FLUoxetine 20 mg capsule  
 hydrALAZINE 50 mg tablet  
 montelukast 10 mg tablet  
 predniSONE 20 mg tablet  
 tiotropium 18 mcg inhalation capsule Discharge Instructions DISCHARGE SUMMARY from Nurse The following personal items are in your possession at time of discharge: 
 
Dental Appliances: None Visual Aid: Glasses Home Medications: None Jewelry: None Clothing: None Other Valuables: None Personal Items Sent to Safe: n/a PATIENT INSTRUCTIONS: 
 
After general anesthesia or intravenous sedation, for 24 hours or while taking prescription Narcotics: · Limit your activities · Do not drive and operate hazardous machinery · Do not make important personal or business decisions · Do  not drink alcoholic beverages · If you have not urinated within 8 hours after discharge, please contact your surgeon on call. What to do at Home: 
Recommended activity: Activity as tolerated *  Please give a list of your current medications to your Primary Care Provider. *  Please update this list whenever your medications are discontinued, doses are 
    changed, or new medications (including over-the-counter products) are added. *  Please carry medication information at all times in case of emergency situations. These are general instructions for a healthy lifestyle: No smoking/ No tobacco products/ Avoid exposure to second hand smoke Surgeon General's Warning:  Quitting smoking now greatly reduces serious risk to your health. Obesity, smoking, and sedentary lifestyle greatly increases your risk for illness A healthy diet, regular physical exercise & weight monitoring are important for maintaining a healthy lifestyle You may be retaining fluid if you have a history of heart failure or if you experience any of the following symptoms:  Weight gain of 3 pounds or more overnight or 5 pounds in a week, increased swelling in our hands or feet or shortness of breath while lying flat in bed. Please call your doctor as soon as you notice any of these symptoms; do not wait until your next office visit.  
 
Recognize signs and symptoms of STROKE: 
 
 F-face looks uneven A-arms unable to move or move unevenly S-speech slurred or non-existent T-time-call 911 as soon as signs and symptoms begin-DO NOT go Back to bed or wait to see if you get better-TIME IS BRAIN. Warning Signs of HEART ATTACK Call 911 if you have these symptoms: 
? Chest discomfort. Most heart attacks involve discomfort in the center of the chest that lasts more than a few minutes, or that goes away and comes back. It can feel like uncomfortable pressure, squeezing, fullness, or pain. ? Discomfort in other areas of the upper body. Symptoms can include pain or discomfort in one or both arms, the back, neck, jaw, or stomach. ? Shortness of breath with or without chest discomfort. ? Other signs may include breaking out in a cold sweat, nausea, or lightheadedness. Don't wait more than five minutes to call [address]! Fast action can save your life. Calling 911 is almost always the fastest way to get lifesaving treatment. Emergency Medical Services staff can begin treatment when they arrive  up to an hour sooner than if someone gets to the hospital by car. [employer] Activation Thank you for requesting access to [employer]. Please follow the instructions below to securely access and download your online medical record. [employer] allows you to send messages to your doctor, view your test results, renew your prescriptions, schedule appointments, and more. How Do I Sign Up? 1. In your internet browser, go to [email or web address] 
2. Click on the First Time User? Click Here link in the Sign In box. You will be redirect to the New Member Sign Up page. 3. Enter your [employer] Access Code exactly as it appears below. You will not need to use this code after youve completed the sign-up process. If you do not sign up before the expiration date, you must request a new code.  
 
[employer] Access Code: [email or web address] 
 Expires: 2017  3:13 AM (This is the date your Tr[employer] access code will ) 4. Enter the last four digits of your Social Security Number (xxxx) and Date of Birth (mm/dd/yyyy) as indicated and click Submit. You will be taken to the next sign-up page. 5. Create a Trigemina ID. This will be your Trigemina login ID and cannot be changed, so think of one that is secure and easy to remember. 6. Create a Tr[employer]emina password. You can change your password at any time. 7. Enter your Password Reset Question and Answer. This can be used at a later time if you forget your password. 8. Enter your e-mail address. You will receive e-mail notification when new information is available in [address] 9. Click Sign Up. You can now view and download portions of your medical record. 10. Click the Download Summary menu link to download a portable copy of your medical information. Additional Information If you have questions, please visit the Frequently Asked Questions section of the Trigemina website at [email or web address] "[email or web address]"/Best Solart/. Remember, Tr[employer] is NOT to be used for urgent needs. For medical emergencies, dial 911. The discharge information has been reviewed with the patient. The patient verbalized understanding. Discharge medications reviewed with the patient and appropriate educational materials and side effects teaching were provided. Patient armband removed and shredded Controlling Your Asthma: Care Instructions Your Care Instructions Asthma is a long-term condition that affects your breathing. It causes the airways that lead to the lungs to swell. During an asthma attack, the airways swell and narrow. This makes it hard to breathe. You may wheeze or cough. If you have a bad attack, you may need emergency care. There are two things to do to treat asthma. · Control asthma over the long term. · Treat attacks when they occur. You and your doctor can make an asthma action plan. It tells you what medicines you need to take every day to control asthma symptoms and what to do if you have an asthma attack. Your asthma action plan can help prevent and treat attacks. When you keep your asthma under control, you can prevent severe attacks and lasting damage to your airways. You need to treat your asthma even when you are not having symptoms. Although asthma is a lifelong problem, you can still lead a full and active life. Follow-up care is a key part of your treatment and safety. Be sure to make and go to all appointments, and call your doctor if you are having problems. It's also a good idea to know your test results and keep a list of the medicines you take. How can you care for yourself at home? To control asthma over the long term Medicines Controller medicines reduce swelling in your lungs. They also prevent asthma attacks. Take your controller medicine exactly as prescribed. Talk to your doctor if you have any problems with your medicine. · Inhaled corticosteroid is a common and effective controller medicine. Using it the right way can prevent or reduce most side effects. · Take your controller medicine every day, not just when you have symptoms. It helps prevent problems before they occur. · Your doctor may prescribe another medicine that you use along with the corticosteroid. This is often a long-acting bronchodilator. Do not take this medicine by itself. Using a long-acting bronchodilator by itself can increase your risk of a severe or fatal asthma attack. · Do not take inhaled corticosteroids or long-acting bronchodilators to stop an asthma attack that has already started. They don't work fast enough to help. · Talk to your doctor before you use other medicines. Some medicines, such as aspirin, can cause asthma attacks in some people. Education · Learn what triggers an asthma attack. Avoid these triggers when you can. Common triggers include colds, smoke, air pollution, dust, pollen, mold, pets, cockroaches, stress, and cold air. · Check yourself for asthma symptoms to know which step to follow in your action plan. Watch for things like being short of breath, having chest tightness, coughing, and wheezing. Also notice if symptoms wake you up at night or if you get tired quickly when you exercise. · If you have a peak flow meter, use it to check how well you are breathing. It can help you know when an asthma attack is going to occur. Then you can take medicine to prevent the asthma attack or make it less severe. · Do not smoke or allow others to smoke around you. Avoid smoky places. Smoking makes asthma worse. If you need help quitting, talk to your doctor about stop-smoking programs and medicines. These can increase your chances of quitting for good. · Avoid colds and the flu. Get a pneumococcal vaccine shot. If you have had one before, ask your doctor whether you need a second dose. Get a flu vaccine every year, as soon as it's available. If you must be around people with colds or the flu, wash your hands often. To treat attacks when they occur Use your asthma action plan when you have an attack. Your quick-relief medicine will stop an asthma attack. It relaxes the muscles that get tight around the airways. If your doctor prescribed corticosteroid pills to use during an attack, take them as directed. They may take hours to work, but they may shorten the attack and help you breathe better. · Albuterol is an effective quick-relief inhaler. · Take your quick-relief medicine exactly as prescribed. · Always bring your asthma medicine with you when you travel. · You may need to use quick-relief medicine before you exercise. · Call your doctor if you think you are having a problem with your medicine. When should you call for help? Call 911 anytime you think you may need emergency care. For example, call if: · You are having severe trouble breathing. Call your doctor now or seek immediate medical care if: 
· Your symptoms do not get better after you have followed your asthma action plan. · You cough up yellow, dark brown, or bloody mucus (sputum). Watch closely for changes in your health, and be sure to contact your doctor if: 
· Your coughing and wheezing get worse. · You need to use your quick-relief medicine on more than 2 days a week (unless it is just for exercise). · You need help figuring out what is triggering your asthma attacks. Where can you learn more? Go to http://magda-kadie.info/. Enter V885 in the search box to learn more about \"Controlling Your Asthma: Care Instructions. \" Current as of: May 23, 2016 Content Version: 11.1 © 5179-0131 [employer]. Care instructions adapted under license by [employer] (which disclaims liability or warranty for this information). If you have questions about a medical condition or this instruction, always ask your healthcare professional. [place] [address] any warranty or liability for your use of this information. 
  
 
 
 
  
Learning About Chronic Bronchitis What is chronic bronchitis? Chronic bronchitis is long-term swelling and the buildup of mucus in the airways of your lungs. The airways (bronchial tubes) get inflamed and make a lot of mucus. This can narrow or block the airways, making it hard for you to breathe. It is a form of COPD (chronic obstructive pulmonary disease). Chronic bronchitis is usually caused by smoking. But chemical fumes, dust, or air pollution also can cause it over time. What can you expect when you have chronic bronchitis? Chronic bronchitis gets worse over time. You cannot undo the damage to your lungs. Over time, you may find that: 
· You get short of breath even when you do simple things like get dressed or fix a meal. 
· It is hard to eat or exercise. · You lose weight and feel weaker. Over many years, the swelling and mucus from chronic bronchitis make it more likely that you will get lung infections. But there are things you can do to prevent more damage and feel better. What are the symptoms? The main symptoms of chronic bronchitis are: · A cough that will not go away. · Mucus that comes up when you cough. · Shortness of breath that gets worse when you exercise. At times, your symptoms may suddenly flare up and get much worse. This is a called an exacerbation (say \"egg-SHARLENE-er-BAY-ham\"). When this happens, your usual symptoms quickly get worse and stay bad. This can be dangerous. You may have to go to the hospital. 
How can you keep chronic bronchitis from getting worse? Don't smoke. That is the best way to keep chronic bronchitis from getting worse. If you already smoke, it is never too late to stop. If you need help quitting, talk to your doctor about stop-smoking programs and medicines. These can increase your chances of quitting for good. You can do other things to keep chronic bronchitis from getting worse: · Avoid bad air. Air pollution, chemical fumes, and dust also can make chronic bronchitis worse. · Get a flu shot every year. A shot may keep the flu from turning into something more serious, like pneumonia. A flu shot also may lower your chances of having a flare-up. · Get a pneumococcal shot. A shot can prevent some of the serious complications of pneumonia. Ask your doctor how often you should get this shot. How is chronic bronchitis treated? Chronic bronchitis is treated with medicines and oxygen. You also can take steps at home to stay healthy and keep your condition from getting worse. Medicines and oxygen therapy · You may be taking medicines such as: ¨ Bronchodilators. These help open your airways and make breathing easier.  Bronchodilators are either short-acting (work for 6 to 9 hours) or long-acting (work for 24 hours). You inhale most bronchodilators, so they start to act quickly. Always carry your quick-relief inhaler with you in case you need it while you are away from home. ¨ Corticosteroids. These reduce airway inflammation. They come in pill or inhaled form. You must take these medicines every day for them to work well. ¨ Antibiotics. These medicines are used when you have a bacterial lung infection. · Take your medicines exactly as prescribed. Call your doctor if you think you are having a problem with your medicine. · Oxygen therapy boosts the amount of oxygen in your blood and helps you breathe easier. Use the flow rate your doctor has recommended, and do not change it without talking to your doctor first. 
Other care at home · If your doctor recommends it, get more exercise. Walking is a good choice. Bit by bit, increase the amount you walk every day. Try for at least 30 minutes on most days of the week. · Learn breathing methodssuch as breathing through pursed lipsto help you become less short of breath. · If your doctor has not set you up with a pulmonary rehabilitation program, talk to him or her about whether rehab is right for you. Rehab includes exercise programs, education about your disease and how to manage it, help with diet and other changes, and emotional support. · Eat regular, healthy meals. Use bronchodilators about 1 hour before you eat to make it easier to eat. Eat several small meals instead of three large ones. Drink beverages at the end of the meal. Avoid foods that are hard to chew. Follow-up care is a key part of your treatment and safety. Be sure to make and go to all appointments, and call your doctor if you are having problems. It's also a good idea to know your test results and keep a list of the medicines you take. Where can you learn more? Go to http://magda-kadie.info/. Enter U247 in the search box to learn more about \"Learning About Chronic Bronchitis. \" Current as of: May 23, 2016 Content Version: 11.1 © 1362-7162 [employer] software[employer]. Care instructions adapted under license by [employer] (which disclaims liability or warranty for this information). If you have questions about a medical condition or this instruction, always ask your healthcare professional. [address] any warranty or liability for your use of this information. Discharge Orders None Introducing [facility]! Natasha Gomez introduces Glow patient portal. Now you can access parts of your medical record, email your doctor's office, and request medication refills online. 1. In your internet browser, go to [email or web address] 2. Click on the First Time User? Click Here link in the Sign In box. You will see the New Member Sign Up page. 3. Enter your Glow Access Code exactly as it appears below. You will not need to use this code after youve completed the sign-up process. If you do not sign up before the expiration date, you must request a new code. · Glow Access Code: [email or web address] Expires: 5/27/2017  3:13 AM 
 
4. Enter the last four digits of your Social Security Number (xxxx) and Date of Birth (mm/dd/yyyy) as indicated and click Submit. You will be taken to the next sign-up page. 5. Create a Glow ID. This will be your Glow login ID and cannot be changed, so think of one that is secure and easy to remember. 6. Create a Glow password. You can change your password at any time. 7. Enter your Password Reset Question and Answer. This can be used at a later time if you forget your password. 8. Enter your e-mail address. You will receive e-mail notification when new information is available in [address] 9. Click Sign Up. You can now view and download portions of your medical record. 10. Click the Download Summary menu link to download a portable copy of your medical information. If you have questions, please visit the Frequently Asked Questions section of the [employer] website. Remember, MyChart is NOT to be used for urgent needs. For medical emergencies, dial 911. Now available from your iPhone and Android! General Information Please provide this summary of care documentation to your next provider. Patient Signature:  ____________________________________________________________ Date:  ____________________________________________________________  
  
[facility] Provider Signature:  ____________________________________________________________ Date:  ____________________________________________________________

## 2017-03-03 NOTE — H&P
[facility]  ROUTINE H AND PS    Name:  Nickie Purdy  MR#:  [id number]  :  1965  Account #:  [de-identified]  Date of Adm:  2017      PRIMARY CARE PHYSICIAN: Based in [place]. She says she  no longer goes there. CHIEF COMPLAINT: Wheezing, shortness of breath. HISTORY OF PRESENT ILLNESS: The patient is a 48-year-old  [facility] [place] female with a history of asthma/COPD well known to  our service with frequent admissions, comes in with the above-stated  complaints. She reports that she was admitted and intubated a few  days ago. However, despite my extensive searching into her medical  records, I do not see where she has been intubated in the recent past.  She, according to our medical records, was admitted on 2017  and discharged on 2017, after being managed for acute on  chronic hypoxic-hypercarbic respiratory failure. At that time, she was  managed with BiPAP. She was discharged home on antibiotics. She  did come again, according to the medical records, yesterday to the  emergency room; however, left against medical advice after requesting  for pain medications, and after being offered Tylenol. She arrived by  EMS earlier today with complaints of shortness of breath. She reports  having cough, nausea, vomiting, wheezing, fevers, and chills. In the  ER, after treatment, she was noted to be hypoxic with ambulation, and  we have been asked to see the patient for further evaluation and  management. REVIEW OF SYSTEMS: Please see above. Otherwise, 10-point  review of systems checked and negative. PAST MEDICAL HISTORY  1. COPD/asthma, on 2 L home oxygen around the clock. 2. Hypertension. 3. She says she was told she had a \"minor heart attack\" in the past.  4. History of anxiety disorder. PAST SURGICAL HISTORY: She denies any surgical interventions. ALLERGIES: NO KNOWN DRUG ALLERGIES.     SOCIAL HISTORY: She reports remote tobacco use in her 25s.  Denies alcohol and recreational drugs. She reports she is unemployed  and lives with her son. FAMILY HISTORY: None reported. MEDICATIONS: According to the electronic chart, she is on  1. Symbicort. 2. Singulair. 3. Percocet  she says she does not take. 4. Doxycycline was the antibiotic she was discharged on a few days  ago. 5. Prednisone. 6. Norvasc 5 mg. 7. Pepcid 20.  8. Prozac. 9. Vesicare. 10. Xanax, she says she takes 1 mg twice a day. 11. Bumex 0.5 mg daily. 12. Zestril 20 mg. PHYSICAL EXAMINATION  VITAL SIGNS: She is afebrile with stable vital signs. GENERAL: She is an obese, disheveled, pleasant female, who  appears stated age, resting comfortably, in no acute distress. PSYCHIATRIC: She is alert, awake, oriented. She has an appropriate  affect. Recent and remote memory appear intact. HEENT: Head is atraumatic, normocephalic. Sclerae are anicteric. Oropharynx without lesions. She has moist mucous membranes. NECK: Supple. LYMPHATICS: No neck or axillary lymphadenopathy. CARDIOVASCULAR: Regular rate and rhythm. No murmurs. No  jugular venous distention. No peripheral edema. PULMONARY: She has audible wheezing without auscultation. Wheezing is diffusely present in both lung fields, wheezing inspiratory  and expiratory. She is able to speak in full sentences and does not  have accessory muscle use. GASTROINTESTINAL: Abdomen is soft, nontender, nondistended. SKIN: Warm, dry, without lesions. NEUROLOGICAL: Nonfocal.  MUSCULOSKELETAL: Unremarkable. LABORATORY DATA  1. Her CBC is unremarkable. Basic metabolic panel shows a creatinine  of 3.30 yesterday, 1.68 today, otherwise unremarkable. Her baseline is  between 0.9 and 1.1.  2. Urine drug screen is positive for opiates. Her ABG shows a pH of  7.33, pCO2 of 46.4, O2 of 69.  3. Imaging studies include a chest x-ray, the official read of which is  not available at this time.  Per my read, no evidence of acute  cardiopulmonary disease, other than prominence of her pulmonary  vasculature. 4. Her EKG shows normal sinus rhythm, no clear ST elevations. IMPRESSION  1. Acute hypoxic-hypercarbic respiratory failure in this patient who has  a history of chronic obstructive pulmonary disease/asthma. She has  evidence of respiratory acidosis and is hypoxic on ABG. 2. Acute kidney injury of unclear etiology. She is on an ACE inhibitor. Her creatinine is much improved compared to a creatinine done on  labs drawn yesterday before she left against medical advice after  coming to the emergency room for respiratory complaints. 3. History of diastolic heart failure. Per her records, appears to be  compensated at this time. 4. History of hypertension. PLAN  1. At this time is to admit the patient to a telemetry bed. Plan is to give  her scheduled and p.r.n. nebulizer treatments. 2. Plan is to give her IV steroids. 3. Pulmonology has been consulted. I am going to give her 2 more  days of her doxycycline to finish the course of 5 days, with which she  was discharged later on last month. 2. Deep venous thrombosis prophylaxis. 3. Hydrate and follow her creatinine. Hold her ACE inhibitor. Should  these measures not improve her creatinine, further workup to include  renal ultrasound and Nephrology consult. 4. Advance directives discussed with the patient. She would like to  have a FULL CODE STATUS.         MD MORRO Montiel / MENG  D:  03/03/2017   08:04  T:  03/03/2017   08:53  Job #:  [id number]

## 2017-03-03 NOTE — IP AVS SNAPSHOT
Summary of Care Report The Summary of Care report has been created to help improve care coordination. Users with access to [employer] or [employer] [facility] (Web-based application) may access additional patient information including the Discharge Summary. If you are not currently a [address] user and need more information, please call the number listed below in the [place] [address] section and ask to be connected with Medical Records. Facility Information Name Address Phone [address] [place] [address] [facility] [address] [phone] Patient Information Patient Name Sex  Weston Haney ([id number]) Female 1965 Discharge Information Admitting Provider Service Area Unit  
 Edna Norwood MD / [address] / [phone] Discharge Provider Discharge Date/Time Discharge Disposition Destination (none) (none) (none) (none) Patient Language Language ENGLISH [13] Problem List as of 3/9/2017  Date Reviewed: 3/3/2017 Codes Priority Class Noted - Resolved Asthma exacerbation attacks ICD-10-CM: J39.36 ICD-9-CM: 493.92   10/19/2015 - Present Status asthmaticus with COPD (chronic obstructive pulmonary disease) (HCC) ICD-10-CM: J44.9, J45.902 ICD-9-CM: 493.21   10/19/2015 - Present RESOLVED: COPD with acute exacerbation ([facility] ICD-10-CM: J44.1 ICD-9-CM: 491.21   2015 - 2015 RESOLVED: Asthma exacerbation ICD-10-CM: J45.901 ICD-9-CM: 493.92   2015 - 2015 Abnormal CT of the chest ICD-10-CM: R93.8 ICD-9-CM: 793.2   2015 - Present RESOLVED: COPD exacerbation ([facility] ICD-10-CM: J44.1 ICD-9-CM: 491.21   2016 - 2016 RESOLVED: COPD with exacerbation ([facility] ICD-10-CM: J44.1 ICD-9-CM: 491.21   2016 - 2016  Shortness of breath ICD-10-CM: R06.02 
 ICD-9-CM: 786.05   3/5/2016 - Present Asthma ICD-10-CM: K11.584 ICD-9-CM: 493.90   3/5/2016 - Present COPD exacerbation ([place] [address].) ICD-10-CM: J44.1 ICD-9-CM: 491.21   3/12/2016 - Present Acute on chronic respiratory failure with hypoxemia [facility]) ICD-10-CM: O48.25 
ICD-9-CM: 518.84   3/12/2016 - Present COPD with exacerbation ([facility] ICD-10-CM: J44.1 ICD-9-CM: 491.21   7/11/2016 - Present Acute exacerbation of chronic obstructive pulmonary disease (COPD) ([place] [address].) ICD-10-CM: J44.1 ICD-9-CM: 491.21   8/29/2016 - Present Respiratory failure ([place] [address].) ICD-10-CM: J96.90 ICD-9-CM: 518.81   1/11/2017 - Present Acute encephalopathy ICD-10-CM: G93.40 ICD-9-CM: 348.30   1/11/2017 - Present Asthma exacerbation ICD-10-CM: V84.124 ICD-9-CM: 493.92   1/26/2017 - Present Acute respiratory failure with hypercapnia ([place] [address].) ICD-10-CM: J96.02 
ICD-9-CM: 518.81   2/26/2017 - Present COPD with acute exacerbation ([facility] ICD-10-CM: J44.1 ICD-9-CM: 491.21   3/2/2017 - Present You are allergic to the following No active allergies Current Discharge Medication List  
  
START taking these medications Dose & Instructions Dispensing Information Comments  
 arformoterol 15 mcg/2 mL Nebu neb solution Commonly known as:  Veryl Leas Dose:  15 mcg  
2 mL by Nebulization route two (2) times a day. Quantity:  60 Vial  
Refills:  1  
   
 budesonide 0.5 mg/2 mL Nbsp Commonly known as:  PULMICORT Dose:  500 mcg 2 mL by Nebulization route two (2) times a day. Quantity:  60 Each Refills:  1  
   
 hydrALAZINE 50 mg tablet Commonly known as:  APRESOLINE Dose:  50 mg Take 1 Tab by mouth three (3) times daily. Quantity:  90 Tab Refills:  1 CONTINUE these medications which have CHANGED Dose & Instructions Dispensing Information Comments * albuterol 90 mcg/actuation inhaler Commonly known as:  VENTOLIN HFA  
 What changed:  Another medication with the same name was added. Make sure you understand how and when to take each. Dose:  2 Puff Take 2 Puffs by inhalation every six (6) hours as needed for Wheezing or Shortness of Breath. Quantity:  1 Inhaler Refills:  1  
   
 * albuterol 2.5 mg /3 mL (0.083 %) nebulizer solution Commonly known as:  PROVENTIL VENTOLIN What changed: You were already taking a medication with the same name, and this prescription was added. Make sure you understand how and when to take each. Dose:  2.5 mg  
3 mL by Nebulization route every four (4) hours as needed for Wheezing. Quantity:  60 Each Refills:  1 ALPRAZolam 1 mg tablet Commonly known as:  Jagdeep Guajardo What changed:   
- medication strength 
- how much to take 
- additional instructions Dose:  1 mg Take 1 Tab by mouth two (2) times a day. Max Daily Amount: 2 mg. Quantity:  30 Tab Refills:  0  
   
 amLODIPine 10 mg tablet Commonly known as:  Toan Austin What changed:   
- medication strength 
- how much to take Dose:  10 mg Take 1 Tab by mouth daily. Quantity:  30 Tab Refills:  1  
   
 budesonide-formoterol 160-4.5 mcg/actuation HFA inhaler Commonly known as:  SYMBICORT What changed:  Another medication with the same name was removed. Continue taking this medication, and follow the directions you see here. Dose:  2 Puff Take 2 Puffs by inhalation two (2) times a day. Quantity:  1 Inhaler Refills:  1  
   
 predniSONE 20 mg tablet Commonly known as:  Cortney Nava What changed:  additional instructions Take 3 tabs by mouth daily for 4 days, then 2 tabs daily for 4 days, then 1 tab daily for 4 days, then STOP. Quantity:  24 Tab Refills:  0  
   
 * Notice: This list has 2 medication(s) that are the same as other medications prescribed for you. Read the directions carefully, and ask your doctor or other care provider to review them with you. CONTINUE these medications which have NOT CHANGED Dose & Instructions Dispensing Information Comments  
 bumetanide 0.5 mg tablet Commonly known as:  Velton Sandifer Dose:  0.5 mg Take 1 Tab by mouth every fourty-eight (48) hours. Quantity:  30 Tab Refills:  1  
   
 famotidine 20 mg tablet Commonly known as:  PEPCID Dose:  20 mg Take 1 Tab by mouth daily. Quantity:  30 Tab Refills:  1 FLUoxetine 20 mg capsule Commonly known as:  PROzac Dose:  60 mg Take 3 Caps by mouth daily. Quantity:  90 Cap Refills:  1  
   
 montelukast 10 mg tablet Commonly known as:  SINGULAIR Dose:  10 mg Take 1 Tab by mouth nightly. Quantity:  30 Tab Refills:  1  
   
 tiotropium 18 mcg inhalation capsule Commonly known as:  Josephus Hence Dose:  1 Cap Take 1 Cap by inhalation daily. Quantity:  30 Cap Refills:  1 STOP taking these medications Comments  
 doxycycline 100 mg capsule Commonly known as:  VIBRAMYCIN  
   
   
 lisinopril 20 mg tablet Commonly known as:  PRINIVIL, ZESTRIL  
   
   
 oxyCODONE-acetaminophen  mg per tablet Commonly known as:  PERCOCET 10  
   
   
 senna-docusate 8.6-50 mg per tablet Commonly known as:  PERICOLACE  
   
   
 solifenacin 5 mg tablet Commonly known as:  VESIcare Current Immunizations Name Date Influenza Vaccine (Quad) PF 10/26/2015 Pneumococcal Polysaccharide (PPSV-23) 10/26/2015 Follow-up Information None Discharge Instructions DISCHARGE SUMMARY from Nurse The following personal items are in your possession at time of discharge: 
 
Dental Appliances: None Visual Aid: Glasses Home Medications: None Jewelry: None Clothing: None Other Valuables: None Personal Items Sent to Safe: n/a PATIENT INSTRUCTIONS: 
 
After general anesthesia or intravenous sedation, for 24 hours or while taking prescription Narcotics: · Limit your activities · Do not drive and operate hazardous machinery · Do not make important personal or business decisions · Do  not drink alcoholic beverages · If you have not urinated within 8 hours after discharge, please contact your surgeon on call. What to do at Home: 
Recommended activity: Activity as tolerated *  Please give a list of your current medications to your Primary Care Provider. *  Please update this list whenever your medications are discontinued, doses are 
    changed, or new medications (including over-the-counter products) are added. *  Please carry medication information at all times in case of emergency situations. These are general instructions for a healthy lifestyle: No smoking/ No tobacco products/ Avoid exposure to second hand smoke Surgeon General's Warning:  Quitting smoking now greatly reduces serious risk to your health. Obesity, smoking, and sedentary lifestyle greatly increases your risk for illness A healthy diet, regular physical exercise & weight monitoring are important for maintaining a healthy lifestyle You may be retaining fluid if you have a history of heart failure or if you experience any of the following symptoms:  Weight gain of 3 pounds or more overnight or 5 pounds in a week, increased swelling in our hands or feet or shortness of breath while lying flat in bed. Please call your doctor as soon as you notice any of these symptoms; do not wait until your next office visit. Recognize signs and symptoms of STROKE: 
 
F-face looks uneven A-arms unable to move or move unevenly S-speech slurred or non-existent T-time-call 911 as soon as signs and symptoms begin-DO NOT go Back to bed or wait to see if you get better-TIME IS BRAIN. Warning Signs of HEART ATTACK Call 911 if you have these symptoms: 
? Chest discomfort.  Most heart attacks involve discomfort in the center of the chest that lasts more than a few minutes, or that goes away and comes back. It can feel like uncomfortable pressure, squeezing, fullness, or pain. ? Discomfort in other areas of the upper body. Symptoms can include pain or discomfort in one or both arms, the back, neck, jaw, or stomach. ? Shortness of breath with or without chest discomfort. ? Other signs may include breaking out in a cold sweat, nausea, or lightheadedness. Don't wait more than five minutes to call [address]! Fast action can save your life. Calling 911 is almost always the fastest way to get lifesaving treatment. Emergency Medical Services staff can begin treatment when they arrive  up to an hour sooner than if someone gets to the hospital by car. [employer] Thank you for requesting access to [employer]. Please follow the instructions below to securely access and download your online medical record. [employer] allows you to send messages to your doctor, view your test results, renew your prescriptions, schedule appointments, and more. How Do I Sign Up? 1. In your internet browser, go to [email or web address] 
2. Click on the First Time User? Click Here link in the Sign In box. You will be redirect to the New Member Sign Up page. 3. Enter your [employer] Access Code exactly as it appears below. You will not need to use this code after youve completed the sign-up process. If you do not sign up before the expiration date, you must request a new code. [employer] Access Code: [email or web address] Expires: 2017  3:13 AM (This is the date your [employer] access code will ) 4. Enter the last four digits of your Social Security Number (xxxx) and Date of Birth (mm/dd/yyyy) as indicated and click Submit. You will be taken to the next sign-up page. 5. Create a [employer] ID. This will be your [employer] login ID and cannot be changed, so think of one that is secure and easy to remember. 6. Create a [employer] password. You can change your password at any time. 7. Enter your Password Reset Question and Answer. This can be used at a later time if you forget your password. 8. Enter your e-mail address. You will receive e-mail notification when new information is available in [address] 9. Click Sign Up. You can now view and download portions of your medical record. 10. Click the Download Summary menu link to download a portable copy of your medical information. Additional Information If you have questions, please visit the Frequently Asked Questions section of the [employer] website at [email or web address] Remember, [employer] is NOT to be used for urgent needs. For medical emergencies, dial 911. The discharge information has been reviewed with the patient. The patient verbalized understanding. Discharge medications reviewed with the patient and appropriate educational materials and side effects teaching were provided. Patient armband removed and shredded Controlling Your Asthma: Care Instructions Your Care Instructions Asthma is a long-term condition that affects your breathing. It causes the airways that lead to the lungs to swell. During an asthma attack, the airways swell and narrow. This makes it hard to breathe. You may wheeze or cough. If you have a bad attack, you may need emergency care. There are two things to do to treat asthma. · Control asthma over the long term. · Treat attacks when they occur. You and your doctor can make an asthma action plan. It tells you what medicines you need to take every day to control asthma symptoms and what to do if you have an asthma attack. Your asthma action plan can help prevent and treat attacks. When you keep your asthma under control, you can prevent severe attacks and lasting damage to your airways.  You need to treat your asthma even when you are not having symptoms. Although asthma is a lifelong problem, you can still lead a full and active life. Follow-up care is a key part of your treatment and safety. Be sure to make and go to all appointments, and call your doctor if you are having problems. It's also a good idea to know your test results and keep a list of the medicines you take. How can you care for yourself at home? To control asthma over the long term Medicines Controller medicines reduce swelling in your lungs. They also prevent asthma attacks. Take your controller medicine exactly as prescribed. Talk to your doctor if you have any problems with your medicine. · Inhaled corticosteroid is a common and effective controller medicine. Using it the right way can prevent or reduce most side effects. · Take your controller medicine every day, not just when you have symptoms. It helps prevent problems before they occur. · Your doctor may prescribe another medicine that you use along with the corticosteroid. This is often a long-acting bronchodilator. Do not take this medicine by itself. Using a long-acting bronchodilator by itself can increase your risk of a severe or fatal asthma attack. · Do not take inhaled corticosteroids or long-acting bronchodilators to stop an asthma attack that has already started. They don't work fast enough to help. · Talk to your doctor before you use other medicines. Some medicines, such as aspirin, can cause asthma attacks in some people. Education · Learn what triggers an asthma attack. Avoid these triggers when you can. Common triggers include colds, smoke, air pollution, dust, pollen, mold, pets, cockroaches, stress, and cold air. · Check yourself for asthma symptoms to know which step to follow in your action plan. Watch for things like being short of breath, having chest tightness, coughing, and wheezing. Also notice if symptoms wake you up at night or if you get tired quickly when you exercise. · If you have a peak flow meter, use it to check how well you are breathing. It can help you know when an asthma attack is going to occur. Then you can take medicine to prevent the asthma attack or make it less severe. · Do not smoke or allow others to smoke around you. Avoid smoky places. Smoking makes asthma worse. If you need help quitting, talk to your doctor about stop-smoking programs and medicines. These can increase your chances of quitting for good. · Avoid colds and the flu. Get a pneumococcal vaccine shot. If you have had one before, ask your doctor whether you need a second dose. Get a flu vaccine every year, as soon as it's available. If you must be around people with colds or the flu, wash your hands often. To treat attacks when they occur Use your asthma action plan when you have an attack. Your quick-relief medicine will stop an asthma attack. It relaxes the muscles that get tight around the airways. If your doctor prescribed corticosteroid pills to use during an attack, take them as directed. They may take hours to work, but they may shorten the attack and help you breathe better. · Albuterol is an effective quick-relief inhaler. · Take your quick-relief medicine exactly as prescribed. · Always bring your asthma medicine with you when you travel. · You may need to use quick-relief medicine before you exercise. · Call your doctor if you think you are having a problem with your medicine. When should you call for help? Call 911 anytime you think you may need emergency care. For example, call if: 
· You are having severe trouble breathing. Call your doctor now or seek immediate medical care if: 
· Your symptoms do not get better after you have followed your asthma action plan. · You cough up yellow, dark brown, or bloody mucus (sputum). Watch closely for changes in your health, and be sure to contact your doctor if: 
· Your coughing and wheezing get worse. · You need to use your quick-relief medicine on more than 2 days a week (unless it is just for exercise). · You need help figuring out what is triggering your asthma attacks. Where can you learn more? Go to http://magda-kadie.info/. Enter D658 in the search box to learn more about \"Controlling Your Asthma: Care Instructions. \" Current as of: May 23, 2016 Content Version: 11.1 © 0269-4372 [employer]. Care instructions adapted under license by [employer] (which disclaims liability or warranty for this information). If you have questions about a medical condition or this instruction, always ask your healthcare professional. [place] [address] any warranty or liability for your use of this information. 
  
 
 
 
  
Learning About Chronic Bronchitis What is chronic bronchitis? Chronic bronchitis is long-term swelling and the buildup of mucus in the airways of your lungs. The airways (bronchial tubes) get inflamed and make a lot of mucus. This can narrow or block the airways, making it hard for you to breathe. It is a form of COPD (chronic obstructive pulmonary disease). Chronic bronchitis is usually caused by smoking. But chemical fumes, dust, or air pollution also can cause it over time. What can you expect when you have chronic bronchitis? Chronic bronchitis gets worse over time. You cannot undo the damage to your lungs. Over time, you may find that: 
· You get short of breath even when you do simple things like get dressed or fix a meal. 
· It is hard to eat or exercise. · You lose weight and feel weaker. Over many years, the swelling and mucus from chronic bronchitis make it more likely that you will get lung infections. But there are things you can do to prevent more damage and feel better. What are the symptoms? The main symptoms of chronic bronchitis are: · A cough that will not go away. · Mucus that comes up when you cough. · Shortness of breath that gets worse when you exercise. At times, your symptoms may suddenly flare up and get much worse. This is a called an exacerbation (say \"egg-ZASS-er-BAY-shun\"). When this happens, your usual symptoms quickly get worse and stay bad. This can be dangerous. You may have to go to the hospital. 
How can you keep chronic bronchitis from getting worse? Don't smoke. That is the best way to keep chronic bronchitis from getting worse. If you already smoke, it is never too late to stop. If you need help quitting, talk to your doctor about stop-smoking programs and medicines. These can increase your chances of quitting for good. You can do other things to keep chronic bronchitis from getting worse: · Avoid bad air. Air pollution, chemical fumes, and dust also can make chronic bronchitis worse. · Get a flu shot every year. A shot may keep the flu from turning into something more serious, like pneumonia. A flu shot also may lower your chances of having a flare-up. · Get a pneumococcal shot. A shot can prevent some of the serious complications of pneumonia. Ask your doctor how often you should get this shot. How is chronic bronchitis treated? Chronic bronchitis is treated with medicines and oxygen. You also can take steps at home to stay healthy and keep your condition from getting worse. Medicines and oxygen therapy · You may be taking medicines such as: ¨ Bronchodilators. These help open your airways and make breathing easier. Bronchodilators are either short-acting (work for 6 to 9 hours) or long-acting (work for 24 hours). You inhale most bronchodilators, so they start to act quickly. Always carry your quick-relief inhaler with you in case you need it while you are away from home. ¨ Corticosteroids. These reduce airway inflammation. They come in pill or inhaled form. You must take these medicines every day for them to work well. ¨ Antibiotics. These medicines are used when you have a bacterial lung infection. · Take your medicines exactly as prescribed. Call your doctor if you think you are having a problem with your medicine. · Oxygen therapy boosts the amount of oxygen in your blood and helps you breathe easier. Use the flow rate your doctor has recommended, and do not change it without talking to your doctor first. 
Other care at home · If your doctor recommends it, get more exercise. Walking is a good choice. Bit by bit, increase the amount you walk every day. Try for at least 30 minutes on most days of the week. · Learn breathing methodssuch as breathing through pursed lipsto help you become less short of breath. · If your doctor has not set you up with a pulmonary rehabilitation program, talk to him or her about whether rehab is right for you. Rehab includes exercise programs, education about your disease and how to manage it, help with diet and other changes, and emotional support. · Eat regular, healthy meals. Use bronchodilators about 1 hour before you eat to make it easier to eat. Eat several small meals instead of three large ones. Drink beverages at the end of the meal. Avoid foods that are hard to chew. Follow-up care is a key part of your treatment and safety. Be sure to make and go to all appointments, and call your doctor if you are having problems. It's also a good idea to know your test results and keep a list of the medicines you take. Where can you learn more? Go to http://magda-kadie.info/. Enter J327 in the search box to learn more about \"Learning About Chronic Bronchitis. \" Current as of: May 23, 2016 Content Version: 11.1 © 3943-0760 [employer]. Care instructions adapted under license by [employer] (which disclaims liability or warranty for this information).  If you have questions about a medical condition or this instruction, always ask your healthcare professional. [place] [address] any warranty or liability for your use of this information. Chart Review Routing History Recipient Method Report Sent By Ubaldo Doan MD  
Phone: [phone] In Basket IP Auto Routed Rd SPENCE MD [[id number]] 10/20/2015  6:00 PM 10/20/2015  
 [facility] Fax: [phone] Fax [facility]SI IP AMB RESULT REPORT IMAGING Vinh Teran [[id number]] 10/25/2015  7:36 PM 10/25/2015 Marie Mendez MD  
Phone: [phone] In [employer] IP Auto Routed Trans Marie Mendez MD [[id number]] 10/26/2015  5:56 PM 10/26/2015 Lianna Doan MD  
Phone: [phone] In [employer] IP Auto Routed Trans Marie Mendez MD [[id number]] 10/26/2015  5:56 PM 10/26/2015 Lianna Doan MD  
Phone: [phone] In Basket IP Auto Routed Rd SPENCE MD [[id number]] 11/1/2015 11:15 AM 11/01/2015 Delmar Parada MD  
Phone: [phone] In Basket IP Auto Routed [address], [place] [[id number]] 11/2/2015  2:34 PM 11/02/2015 Lianna Doan MD  
Phone: [phone] In Basket IP Auto Routed [address], [place] [[id number]] 11/2/2015  2:34 PM 11/02/2015 Aashish Morel MD  
Phone: [phone] In Basket IP Auto Routed [address], [place] [[id number]] 11/18/2015  8:17 AM 11/18/2015 Delmar Parada MD  
Phone: [phone] In Basket IP Auto Routed [address], [place] [[id number]] 11/18/2015  8:17 AM 11/18/2015 Elizabeth Cosme MD  
Phone: [phone] In Basket IP Auto Routed [address], [place] [[id number]] 11/18/2015  8:17 AM 11/18/2015 Beverly Wilde RN Phone: [phone] In Basket Notes/Transcriptions [id number] 11/23/2015  2:17 PM 11/20/2015 Mildred Dorsey NP Phone: [phone] In [employer] IP Auto Routed 900 [facility], [place] [[id number]] 11/26/2015 12:40 PM 11/26/2015 Radha Tran MD  
Phone: [phone] In [employer] IP Auto Routed 900 [facility] Duane Elliott MD [[id number]] 11/26/2015 12:40 PM 11/26/2015 Flor Ordonez MD  
Phone: [phone] In [employer] IP Auto Routed 900 [facility] Duane Elliott MD [[id number]] 11/26/2015 12:40 PM 11/26/2015 Cindi Mays MD  
Phone: [phone] In [employer] Routed [place], [place] [[id number]] 2/7/2016 12:07 PM 02/07/2016 Charis Gil MD  
Fax: [phone] Phone: [phone] Fax IP Auto Routed Trans Shun Willard MD [[id number]] 3/5/2016  8:21 PM 03/05/2016 Jannette Candelario MD  
Phone: [phone] In Basket IP Auto Routed Trans Shun Willard MD [[id number]] 3/5/2016  8:21 PM 03/05/2016 Quinn Leigh DO Phone: [phone] In Basket IP Auto Routed Trans Shun Willard MD [[id number]] 3/5/2016  8:21 PM 03/05/2016 Shun Willard MD  
Phone: [phone] In Basket IP Auto Routed Trans Shun Willard MD [[id number]] 3/5/2016  8:21 PM 03/05/2016 Charis Gil MD  
Fax: [phone] Phone: [phone] Fax IP Auto Routed Mario Tanner III, MD [[id number]] 3/18/2016  8:44 PM 03/18/2016 Nicole Atkinson MD  
Phone: [phone] In Basket IP Auto Routed Mario Tanner III, MD [[id number]] 3/18/2016  8:44 PM 03/18/2016 Mary Isaacs MD  
Phone: [phone] In Basket IP Auto Routed Annita Palomo MD [[id number]] 3/20/2016  2:36 PM 03/20/2016 Charis Gil MD  
Fax: [phone] Phone: [phone] Fax Winston Jurado Routed Annita Palomo  [phone] 3/20/2016  2:36 PM 03/20/2016 Jesús Mosher MD  
Phone: [phone] In Basket IP Auto Routed Annita Palomo MD [[id number]] 3/20/2016  2:36 PM 03/20/2016 Nicole Atkinson MD  
Phone: [phone] In Basket IP Auto Routed Annita Plaomo MD [[id number]] 3/20/2016  2:36 PM 03/20/2016 Charis Gil MD  
Fax: [phone] Phone: [phone] Fax IP Auto Routed Annita Palomo MD [[id number]] 5/19/2016  3:19 PM 05/19/2016  Ana Ivan MD  
 Phone: [phone] In Basket IP Auto Routed Annita Palomo MD [[id number]] 5/19/2016  3:19 PM 05/19/2016 Mary Begum MD  
Phone: [phone] In Basket IP Auto Routed Annita Palomo MD [[id number]] 5/19/2016  3:19 PM 05/19/2016 Zeinab Rivera MD  
Phone: [phone] In Basket IP Auto Routed Annita Palomo MD [[id number]] 5/19/2016  3:19 PM 05/19/2016 Victorina Marino MD  
Fax: [phone] Phone: [phone] Fax IP Auto Routed MD Michelle [[id number]] 7/4/2016  4:21 PM 07/04/2016 Belle Banerjee MD  
Phone: [phone] In [employer] IP Auto Routed MD Michelle [[id number]] 7/4/2016  4:21 PM 07/04/2016 Unknown MD Dudley  
Phone: [phone] In [employer] IP Auto Routed MD Michelle [[id number]] 7/4/2016  4:21 PM 07/04/2016 Zeinab Rivera MD  
Phone: [phone] In [employer] IP Auto Routed MD Michelle [[id number]] 7/4/2016  4:21 PM 07/04/2016 Victorina Marino MD  
Fax: [phone] Phone: [phone] Fax IP Auto Routed Trans Tony Saravia MD [[id number]] 7/14/2016  3:34 PM 07/14/2016 Ronel Enrique MD  
Phone: [phone] In Basket IP Auto Routed Trans Tony Saravia MD [[id number]] 7/14/2016  3:34 PM 07/14/2016 Tony Saravia MD  
Phone: [phone] In Basket IP Auto Routed Trans Tony Saravia MD [[id number]] 7/14/2016  3:34 PM 07/14/2016 Libby Peterson MD  
Fax: [phone] Phone: [phone] Fax IP Auto Routed Trans Tony Saravia MD [[id number]] 7/14/2016  3:34 PM 07/14/2016 Kumar Maynard RN Phone: [phone] In Basket Notes/Transcriptions [facility], [address] [place] [address] [[id number]] 7/14/2016  4:09 PM 07/14/2016 Victorina Marino MD  
Fax: [phone] Phone: [phone] Fax [facility] MD NOTES AUTO ROUTING REPORT Nery Armstrong MD [[id number]] 9/6/2016  9:11 AM 09/06/2016 Farhat Stephens MD  
Fax: [phone] Phone: [phone] Fax IP Auto Routed Annita Palomo  [phone] 1/15/2017 11:48 AM 01/15/2017 Jorge Vincent MD  
Phone: [phone] In Basket IP Auto Routed Annita Palomo MD [[id number]] 1/15/2017 11:48 AM 01/15/2017

## 2017-03-03 NOTE — ED PROVIDER NOTES
HPI Comments: 3:59 AM Cj Ngo is a 45 y.o. female who presents to the ED c/o SOB onset just PTA. Patient was seen in the ED yesterday for the same symptoms, and states \"I went home, and it just got worse\". En route, the patient received 1 of Atrovent and 1 of Albuterol. No further complaints at this time. The history is provided by the patient. Past Medical History:   Diagnosis Date    Asthma     Chronic obstructive pulmonary disease ([facility] [address]     Endocrine disease     thyroid issues    Gastrointestinal disorder     \"blockage in my stomach\"    Hypertension        No past surgical history on file. No family history on file. Social History     Social History    Marital status:      Spouse name: N/A    Number of children: N/A    Years of education: N/A     Occupational History    Not on file. Social History Main Topics    Smoking status: Never Smoker    Smokeless tobacco: Never Used    Alcohol use No      Comment: socially    Drug use: No    Sexual activity: No      Comment: last used 9 years ago     Other Topics Concern    Not on file     Social History Narrative         ALLERGIES: Review of patient's allergies indicates no known allergies. Review of Systems   Constitutional: Negative. Negative for activity change and appetite change. HENT: Negative for congestion, ear discharge, ear pain, facial swelling, nosebleeds, postnasal drip, sinus pressure, sneezing and tinnitus. Eyes: Negative for pain, discharge, redness and visual disturbance. Respiratory: Positive for shortness of breath. Negative for apnea, cough, choking, chest tightness, wheezing and stridor. Cardiovascular: Negative for chest pain and leg swelling. Gastrointestinal: Negative for abdominal distention, abdominal pain, anal bleeding, blood in stool, constipation, diarrhea, nausea and vomiting.    Genitourinary: Negative for decreased urine volume, difficulty urinating, dyspareunia, dysuria, flank pain, frequency, hematuria, pelvic pain, urgency, vaginal bleeding and vaginal discharge. Musculoskeletal: Negative for arthralgias, back pain, gait problem, joint swelling, myalgias, neck pain and neck stiffness. Skin: Negative for color change. Neurological: Negative for dizziness, tremors, seizures, speech difficulty, weakness, numbness and headaches. Hematological: Negative for adenopathy. Does not bruise/bleed easily. Psychiatric/Behavioral: Negative for agitation, dysphoric mood and self-injury. The patient is not nervous/anxious. Vitals:    03/03/17 0515 03/03/17 0521 03/03/17 0530 03/03/17 0545   BP: 172/64 172/64 170/78 161/70   Pulse: 83 80 84 83   Resp: 17  21 19   Temp:       SpO2: 95%  100% 97%            Physical Exam   Constitutional: She is oriented to person, place, and time. She appears well-developed and well-nourished. HENT:   Head: Normocephalic and atraumatic. Right Ear: External ear normal.   Left Ear: External ear normal.   Nose: Nose normal.   Mouth/Throat: Oropharynx is clear and moist.   Eyes: Conjunctivae and EOM are normal. Pupils are equal, round, and reactive to light. Neck: Normal range of motion. Neck supple. Cardiovascular: Regular rhythm, normal heart sounds and intact distal pulses. Pulmonary/Chest: Accessory muscle usage present. No respiratory distress. She has wheezes (2+). She has no rales. She exhibits no tenderness. Abdominal: Soft. Bowel sounds are normal. She exhibits no distension and no mass. There is no tenderness. There is no rebound and no guarding. Musculoskeletal: Normal range of motion. She exhibits no edema. Neurological: She is alert and oriented to person, place, and time. Skin: Skin is warm and dry. No rash noted. No erythema. Psychiatric: She has a normal mood and affect. Her behavior is normal. Judgment normal.   Nursing note and vitals reviewed.        MDM  Number of Diagnoses or Management Options  Diagnosis management comments: 45year old female, known to me presents with dyspnea after being discharged this AM with an exacerbation of COPD.  Will trend labs, treat, follow       Amount and/or Complexity of Data Reviewed  Clinical lab tests: ordered and reviewed  Tests in the radiology section of CPT®: ordered and reviewed    Risk of Complications, Morbidity, and/or Mortality  Presenting problems: high      ED Course       Procedures    Vitals:  Patient Vitals for the past 12 hrs:   Temp Pulse Resp BP SpO2   03/03/17 0545 - 83 19 161/70 97 %   03/03/17 0530 - 84 21 170/78 100 %   03/03/17 0521 - 80 - 172/64 -   03/03/17 0515 - 83 17 172/64 95 %   03/03/17 0500 - 80 23 160/63 96 %   03/03/17 0445 - 86 23 154/74 97 %   03/03/17 0430 98.3 °F (36.8 °C) 77 17 135/85 100 %   03/03/17 0415 - 73 16 163/64 100 %   03/03/17 0400 - - - - 99 %   03/03/17 0359 - - - - 100 %       Medications ordered:   Medications   albuterol (PROVENTIL VENTOLIN) nebulizer solution 2.5 mg (2.5 mg Nebulization Given 3/3/17 0412)   albuterol (PROVENTIL VENTOLIN) nebulizer solution 2.5 mg (not administered)   tiotropium (SPIRIVA) inhalation capsule 18 mcg (not administered)   methylPREDNISolone (PF) (SOLU-MEDROL) injection 125 mg (125 mg IntraVENous Given 3/3/17 0514)   HYDROmorphone (PF) (DILAUDID) injection 0.5 mg (0.5 mg IntraVENous Given 3/3/17 0514)   albuterol-ipratropium (DUO-NEB) 2.5 MG-0.5 MG/3 ML (3 mL Nebulization Given 3/3/17 0412)   albuterol-ipratropium (DUO-NEB) 2.5 MG-0.5 MG/3 ML (3 mL Nebulization Given 3/3/17 0412)   albuterol (PROVENTIL VENTOLIN) nebulizer solution 2.5 mg (2.5 mg Nebulization Given 3/3/17 0521)   magnesium sulfate 2 g/50 ml IVPB (premix or compounded) (2 g IntraVENous New Bag 3/3/17 0535)         Lab findings:      EKG interpretation by ED Physician:    X-Ray, CT or other radiology findings or impressions:  XR CHEST PORT    (Results Pending)       Progress notes, Consult notes or additional Procedure notes:   7:06 AM Pt failed ambulation; O2 dropped below 90 and pt became SOB; do not see fit for discharge; discussed w/ hospitalist who agrees w/ admission     Reevaluation of patient:   I have reassessed the patient. Patient is feeling . Patient was    Disposition:  Diagnosis:   1. COPD with acute exacerbation ([address]        Disposition:    Follow-up Information     None           Patient's Medications   Start Taking    No medications on file   Continue Taking    ALBUTEROL (VENTOLIN HFA) 90 MCG/ACTUATION INHALER    Take 2 Puffs by inhalation every six (6) hours as needed for Wheezing or Shortness of Breath. ALPRAZOLAM (XANAX) 0.5 MG TABLET    Take 5 Tabs by mouth two (2) times a day. Max Daily Amount: 5 mg. This information was told to current writer by patient. Patient fills script at [facility] [address]. AMLODIPINE (NORVASC) 5 MG TABLET    Take 1 Tab by mouth daily. BUDESONIDE-FORMOTEROL (SYMBICORT) 160-4.5 MCG/ACTUATION HFA INHALER    Take 2 Puffs by inhalation two (2) times a day. BUDESONIDE-FORMOTEROL (SYMBICORT) 160-4.5 MCG/ACTUATION HFA INHALER    Take 2 Puffs by inhalation two (2) times a day. BUMETANIDE (BUMEX) 0.5 MG TABLET    Take 1 Tab by mouth every fourty-eight (48) hours. DOXYCYCLINE (VIBRAMYCIN) 100 MG CAPSULE    Take 1 Cap by mouth every twelve (12) hours for 5 days. FAMOTIDINE (PEPCID) 20 MG TABLET    Take 1 Tab by mouth daily. FLUOXETINE (PROZAC) 20 MG CAPSULE    Take 3 Caps by mouth daily. LISINOPRIL (PRINIVIL, ZESTRIL) 20 MG TABLET    Take 1 Tab by mouth daily. MONTELUKAST (SINGULAIR) 10 MG TABLET    Take 1 Tab by mouth nightly. OXYCODONE-ACETAMINOPHEN (PERCOCET 10)  MG PER TABLET    Take 1 Tab by mouth every four (4) hours as needed. Max Daily Amount: 6 Tabs. PREDNISONE (DELTASONE) 20 MG TABLET    Take 3 tabs by mouth daily for 2 days, then 2 tabs daily for 2 days, then 1 tab daily for 2 days, then STOP.     SENNA-DOCUSATE (PERICOLACE) 8.6-50 MG PER TABLET Take 2 Tabs by mouth nightly. HOLD for loose stool. SOLIFENACIN (VESICARE) 5 MG TABLET    Take 1 Tab by mouth daily. TIOTROPIUM (SPIRIVA) 18 MCG INHALATION CAPSULE    Take 1 Cap by inhalation daily. These Medications have changed    No medications on file   Stop Taking    No medications on file       SCRIBE ATTESTATION STATEMENT  Documented by: Marleen Ray scribing for, and in the presence of, Sandro Purvis MD 4:01 AM     Signed by: Harry Toney, 3/3/2017, 4:01 AM     Provider Attestation:   I personally performed the services described in the documentation, reviewed the documentation, as recorded by the scribe in my presence, and it accurately and completely records my words and actions. March 03, 2017 at 5:16 AM - Sandro Purvis MD        Scribe Attestation:   I, Alfredo Sung am scribing for and in the presence of Sandro Purvis MD on this day 03/03/17 at 7:07 AM   harry Pearce    Provider Attestation:  I personally performed the services described in the documentation, reviewed the documentation, as recorded by the scribe in my presence, and it accurately and completely records my words and actions.   Sandro Purvis MD. 7:07 AM      Signed by: Harry Pearce, 7:07 AM

## 2017-03-04 LAB
ANION GAP BLD CALC-SCNC: 10 MMOL/L (ref 3–18)
BUN SERPL-MCNC: 20 MG/DL (ref 7–18)
BUN/CREAT SERPL: 19 (ref 12–20)
CALCIUM SERPL-MCNC: 7.6 MG/DL (ref 8.5–10.1)
CHLORIDE SERPL-SCNC: 106 MMOL/L (ref 100–108)
CO2 SERPL-SCNC: 21 MMOL/L (ref 21–32)
CREAT SERPL-MCNC: 1.06 MG/DL (ref 0.6–1.3)
GLUCOSE BLD STRIP.AUTO-MCNC: 111 MG/DL (ref 70–110)
GLUCOSE BLD STRIP.AUTO-MCNC: 131 MG/DL (ref 70–110)
GLUCOSE BLD STRIP.AUTO-MCNC: 141 MG/DL (ref 70–110)
GLUCOSE BLD STRIP.AUTO-MCNC: 147 MG/DL (ref 70–110)
GLUCOSE SERPL-MCNC: 143 MG/DL (ref 74–99)
POTASSIUM SERPL-SCNC: 4.4 MMOL/L (ref 3.5–5.5)
POTASSIUM SERPL-SCNC: 5.1 MMOL/L (ref 3.5–5.5)
SODIUM SERPL-SCNC: 137 MMOL/L (ref 136–145)

## 2017-03-04 RX ADMIN — ALPRAZOLAM 1 MG: 1 TABLET ORAL at 17:27

## 2017-03-04 RX ADMIN — METHYLPREDNISOLONE SODIUM SUCCINATE 60 MG: 125 INJECTION, POWDER, FOR SOLUTION INTRAMUSCULAR; INTRAVENOUS at 06:32

## 2017-03-04 RX ADMIN — METHYLPREDNISOLONE SODIUM SUCCINATE 60 MG: 125 INJECTION, POWDER, FOR SOLUTION INTRAMUSCULAR; INTRAVENOUS at 12:35

## 2017-03-04 RX ADMIN — AMLODIPINE BESYLATE 10 MG: 10 TABLET ORAL at 08:54

## 2017-03-04 RX ADMIN — BUDESONIDE 500 MCG: 0.5 INHALANT RESPIRATORY (INHALATION) at 08:00

## 2017-03-04 RX ADMIN — ALPRAZOLAM 1 MG: 1 TABLET ORAL at 08:55

## 2017-03-04 RX ADMIN — METHYLPREDNISOLONE SODIUM SUCCINATE 60 MG: 125 INJECTION, POWDER, FOR SOLUTION INTRAMUSCULAR; INTRAVENOUS at 17:27

## 2017-03-04 RX ADMIN — METHYLPREDNISOLONE SODIUM SUCCINATE 60 MG: 125 INJECTION, POWDER, FOR SOLUTION INTRAMUSCULAR; INTRAVENOUS at 23:03

## 2017-03-04 RX ADMIN — ALBUTEROL SULFATE 2.5 MG: 2.5 SOLUTION RESPIRATORY (INHALATION) at 13:03

## 2017-03-04 RX ADMIN — ARFORMOTEROL TARTRATE 15 MCG: 15 SOLUTION RESPIRATORY (INHALATION) at 19:27

## 2017-03-04 RX ADMIN — METHYLPREDNISOLONE SODIUM SUCCINATE 60 MG: 125 INJECTION, POWDER, FOR SOLUTION INTRAMUSCULAR; INTRAVENOUS at 01:23

## 2017-03-04 RX ADMIN — OXYCODONE HYDROCHLORIDE AND ACETAMINOPHEN 2 TABLET: 5; 325 TABLET ORAL at 12:34

## 2017-03-04 RX ADMIN — BUDESONIDE 500 MCG: 0.5 INHALANT RESPIRATORY (INHALATION) at 19:28

## 2017-03-04 RX ADMIN — FLUOXETINE 60 MG: 20 CAPSULE ORAL at 08:55

## 2017-03-04 RX ADMIN — OXYCODONE HYDROCHLORIDE AND ACETAMINOPHEN 2 TABLET: 5; 325 TABLET ORAL at 06:27

## 2017-03-04 RX ADMIN — FAMOTIDINE 20 MG: 20 TABLET ORAL at 08:54

## 2017-03-04 RX ADMIN — OXYCODONE HYDROCHLORIDE AND ACETAMINOPHEN 2 TABLET: 5; 325 TABLET ORAL at 18:07

## 2017-03-04 NOTE — PROGRESS NOTES
Care Management Interventions  Transition of Care Consult (CM Consult): Discharge Planning  Discharge Durable Medical Equipment: No (Pt uses a walker and cane.)  Physical Therapy Consult: No  Occupational Therapy Consult: No  Current Support Network: Relative's Home (Pt lives with her adult son and daughter.)  Plan discussed with Pt/Family/Caregiver: Yes  Pt uses home oxygen. Pt states she does not want to return home to live with her son and daughter when discharged due to both of them smoking around her. Pt states she does not have a place to go when discharged. Options were discussed. Pt has been provided with LTC facility and [facility] lists. CM explained if Pt chooses to go to a LTC facility when discharged; she will be required to provide the facility with all of her monthly check with the exception of $40. Pt states she will review both list and decide the better fit for her prior to d/c.

## 2017-03-04 NOTE — ROUTINE PROCESS
Pt is stable, Aox4  Resting in bed. No sign of SOB or Distressed. Am Nebulizer given. Pt still was slighty wheezing. Pt is on 2L NC. Will continue to monitor.  Pain managed by po pain meds

## 2017-03-04 NOTE — PROGRESS NOTES
03/03/17 8296   Patient Observation   Observations pt sleeping. woken up for meds. no s/s of distress.

## 2017-03-04 NOTE — ROUTINE PROCESS
Bedside and Verbal shift change report given to [facility]. Radha Gastelum RN (oncoming nurse) by Araseli Ribera RN (offgoing nurse). Report included the following information SBAR, Kardex, MAR and Recent Results. SITUATION:    Code Status: Full Code   Reason for Admission: COPD exacerbation ([facility] [address]    [facility] day: 1   Problem List:       Hospital Problems  Date Reviewed: 3/3/2017          Codes Class Noted POA    COPD exacerbation ([facility] [address] ICD-10-CM: J44.1  ICD-9-CM: 491.21  3/12/2016 Unknown              BACKGROUND:    Past Medical History:   Past Medical History:   Diagnosis Date    Asthma     Chronic obstructive pulmonary disease ([facility] [address]     Endocrine disease     thyroid issues    Gastrointestinal disorder     \"blockage in my stomach\"    Hypertension          Patient taking anticoagulants no     ASSESSMENT:    Changes in Assessment Throughout Shift: No     Patient has Central Line: no Reasons if yes: No   Patient has Walker Cath: no Reasons if yes: No      Last Vitals:     Vitals:    03/03/17 2151 03/04/17 0029 03/04/17 0437 03/04/17 0741   BP:  147/76 167/88 173/79   Pulse:  82 82 83   Resp:  19 19 19   Temp:  98 °F (36.7 °C) 98.2 °F (36.8 °C) 98 °F (36.7 °C)   SpO2: 95% 95% 92% 99%        IV and DRAINS (will only show if present)   Peripheral IV 03/03/17 Right Antecubital-Site Assessment: Clean, dry, & intact     WOUND (if present)   Wound Type:  none   Dressing present     Wound Concerns/Notes:  none     PAIN    Pain Assessment    Pain Intensity 1: 3 (03/04/17 0130)    Pain Location 1: Rib cage    Pain Intervention(s) 1: Medication (see MAR)    Patient Stated Pain Goal: 0  o Interventions for Pain:  none, Yes  o Intervention effective: no  o Time of last intervention: 0627   o Reassessment Completed: no      Last 3 Weights: There were no vitals filed for this visit.   Weight change:      INTAKE/OUPUT    Current Shift:      Last three shifts: 03/02 1901 - 03/04 0700  In: 460 [P.O.:460]  Out: 850 [Urine:850]     LAB RESULTS     Recent Labs      03/03/17   0505  03/02/17   0425   WBC  11.9  13.7*   HGB  15.0  15.1   HCT  46.1*  47.7*   PLT  235  310        Recent Labs      03/04/17   0240  03/03/17   0505  03/02/17   0425   NA  137  139  138   K  5.1  4.0  3.9   GLU  143*  96  107*   BUN  20*  39*  38*   CREA  1.06  1.68*  3.30*   CA  7.6*  8.4*  8.2*       RECOMMENDATIONS AND DISCHARGE PLANNING     1. Pending tests/procedures/ Plan of Care or Other Needs: No    2. Discharge plan for patient and Needs/Barriers: No    3. Estimated Discharge Date: unknown Posted on Whiteboard in Patients Room: no      4. The patient's care plan was reviewed with the oncoming nurse. \"HEALS\" SAFETY CHECK      Fall Risk    Total Score: 2    Safety Measures: Safety Measures: Bed/Chair alarm on, Bed/Chair-Wheels locked, Bed in low position, Call light within reach, Fall prevention (comment), Side rails X 3    A safety check occurred in the patient's room between off going nurse and oncoming nurse listed above. The safety check included the below items  Area Items   H  High Alert Medications - Verify all high alert medication drips (heparin, PCA, etc.)   E  Equipment - Suction is set up for ALL patients (with yanker)  - Red plugs utilized for all equipment (IV pumps, etc.)  - WOWs wiped down at end of shift.  - Room stocked with oxygen, suction, and other unit-specific supplies   A  Alarms - Bed alarm is set for fall risk patients  - Ensure chair alarm is in place and activated if patient is up in a chair   L  Lines - Check IV for any infiltration  - Walker bag is empty if patient has a Walker   - Tubing and IV bags are labeled   S  Safety   - Room is clean, patient is clean, and equipment is clean. - Hallways are clear from equipment besides carts.    - Fall bracelet on for fall risk patients  - Ensure room is clear and free of clutter  - Suction is set up for ALL patients (with yanker)  - Hallways are clear from equipment besides carts.    - Isolation precautions followed, supplies available outside room, sign posted     Janae Stone RN

## 2017-03-04 NOTE — ROUTINE PROCESS
Pt is stable, AOx4,  Pt is resting in bed. No sob noted or distressed. Pain is managed with PO pain meds.  Will continue to monitor ;

## 2017-03-04 NOTE — ROUTINE PROCESS
Bedside and Verbal shift change report given to Mj Garcia (oncoming nurse) by Bernardo Stanton RN (offgoing nurse). Report included the following information SBAR, Kardex, MAR and Recent Results. SITUATION:    Code Status: Full Code   Reason for Admission: COPD exacerbation ([facility] [address]    [facility] day: 0   Problem List:       Hospital Problems  Date Reviewed: 3/3/2017          Codes Class Noted POA    COPD exacerbation ([facility] [address] ICD-10-CM: J44.1  ICD-9-CM: 491.21  3/12/2016 Unknown              BACKGROUND:    Past Medical History:   Past Medical History:   Diagnosis Date    Asthma     Chronic obstructive pulmonary disease ([facility] [address]     Endocrine disease     thyroid issues    Gastrointestinal disorder     \"blockage in my stomach\"    Hypertension          Patient taking anticoagulants PT REFUSED     ASSESSMENT:    Changes in Assessment Throughout Shift: none     Patient has Central Line: no Reasons if yes: .  Patient has Walker Cath: no Reasons if yes: .  Last Vitals:     Vitals:    03/03/17 1545 03/03/17 1644 03/03/17 1931 03/03/17 2151   BP: 185/84  165/84    Pulse: 86  98    Resp: 22  20    Temp: 97.9 °F (36.6 °C)  98.3 °F (36.8 °C)    SpO2: 98% 99% 93% 95%        IV and DRAINS (will only show if present)   Peripheral IV 03/03/17 Right Antecubital-Site Assessment: Clean, dry, & intact     WOUND (if present)   Wound Type:  none   Dressing present     Wound Concerns/Notes:  none     PAIN    Pain Assessment    Pain Intensity 1: 0 (03/03/17 2003)    Pain Location 1: Rib cage    Pain Intervention(s) 1: Medication (see MAR), Distraction, Family Support, Therapeutic presence    Patient Stated Pain Goal: 0  o Interventions for Pain:  See MAR  o Intervention effective: yes  o Time of last intervention: See MAR   o Reassessment Completed: yes      Last 3 Weights: There were no vitals filed for this visit.   Weight change:      INTAKE/OUPUT    Current Shift:      Last three shifts: 03/02 0701 - 03/03 1900  In: 460 [P.O.:460]  Out: 450 [Urine:450]     LAB RESULTS     Recent Labs      03/03/17   0505  03/02/17   0425   WBC  11.9  13.7*   HGB  15.0  15.1   HCT  46.1*  47.7*   PLT  235  310        Recent Labs      03/03/17   0505  03/02/17   0425   NA  139  138   K  4.0  3.9   GLU  96  107*   BUN  39*  38*   CREA  1.68*  3.30*   CA  8.4*  8.2*       RECOMMENDATIONS AND DISCHARGE PLANNING     1. Pending tests/procedures/ Plan of Care or Other Needs: NONE     2. Discharge plan for patient and Needs/Barriers: NONE    3. Estimated Discharge Date: 3/5/2017 Posted on Whiteboard in Patients Room: no      4. The patient's care plan was reviewed with the oncoming nurse. \"HEALS\" SAFETY CHECK      Fall Risk    Total Score: 2    Safety Measures: Safety Measures: Bed in low position, Bed/Chair-Wheels locked, Caregiver at bedside, Call light within reach    A safety check occurred in the patient's room between off going nurse and oncoming nurse listed above. The safety check included the below items  Area Items   H  High Alert Medications - Verify all high alert medication drips (heparin, PCA, etc.)   E  Equipment - Suction is set up for ALL patients (with rios)  - Red plugs utilized for all equipment (IV pumps, etc.)  - WOWs wiped down at end of shift.  - Room stocked with oxygen, suction, and other unit-specific supplies   A  Alarms - Bed alarm is set for fall risk patients  - Ensure chair alarm is in place and activated if patient is up in a chair   L  Lines - Check IV for any infiltration  - Walker bag is empty if patient has a Walker   - Tubing and IV bags are labeled   S  Safety   - Room is clean, patient is clean, and equipment is clean. - Hallways are clear from equipment besides carts. - Fall bracelet on for fall risk patients  - Ensure room is clear and free of clutter  - Suction is set up for ALL patients (with rios)  - Hallways are clear from equipment besides carts.    - Isolation precautions followed, supplies available outside room, sign posted     Epifanio Ackerman RN

## 2017-03-04 NOTE — ROUTINE PROCESS
2777: Pt stable. Wheezing  On both lower and upper lung  Lobes. Had non productive cough. Breath sounds diminished. Pain well managed with pain medication. Denies nausea and vomiting. Vital signs stable. Will continue to monitor pt.

## 2017-03-04 NOTE — ROUTINE PROCESS
Received pt. A&O x 4. Pt complain of rib cage pain and want percocet. Pt informed that as per the report from ER, she was informed that the MD would like to limit her use of narcotics and she would be getting tylenol and the respiratory medication. Pt not happy about the fact that she is not having any order for percocet. Pt refused blood sugar checks stating that she is not diabetic. Pt also refused to get heparin injection stating that it is bruising her skin and that is not what brought her to the hospital. Education on heparin and its importance provided to the pt, pt verbalizes understanding but still refuse to take the medication.

## 2017-03-04 NOTE — PROGRESS NOTES
[facility]. 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 0    Status asthmaticus with COPD (chronic obstructive pulmonary disease) ([facility]) J44.9, J45.902    Abnormal CT of the chest R93.8    Shortness of breath R06.02    Asthma J45.909    COPD exacerbation ([facility]) J44.1    Acute on chronic respiratory failure with hypoxemia ([facility]) J96.21    COPD with exacerbation ([facility]) J44.1    Acute exacerbation of chronic obstructive pulmonary disease (COPD) ([facility]) J44.1    Respiratory failure ([facility]) J96.90    Acute encephalopathy G93.40    Asthma exacerbation J45. 901    Acute respiratory failure with hypercapnia ([facility]) J96.02    COPD with acute exacerbation ([facility]) J44.1      RECOMMENDATIONS:   · Continue IV solumedrol 60 mg IV Q6H  · Continue Brovana and Pulmicort. · Albuterol PRN  · Continue Singulair, Spiriva, Symbicort.   · Continue BiPAP PRN  · Hallway oxymetry when patient can walk, oxygen saturation goal >88%  · Judicious anxiolytics  · Needs asthma/COPD education and action plan as well as outpatient interventions- high risk readmissions and chronic complex disease management  · Prophylaxis: DVT and PUD  · Further recommendations will be based on the patient's response to recommended treatment and results of the investigation ordered. Subjective:   03/04/17   Feels some better- still tight and coughing- not able to expectorate mucus. Denies any new complaints. Wearing BiPAP intermittently. HPI:  This patient has been seen and evaluated at the request of Dr. Raulito Teran for  Acute exacerbation of Asthma. Patient is a 45 y.o. female came to ER yesterday after recent discharge from [facility] and left AMA. The patient states that she got home and very quickly she started feeling worse and paramedics were called. The  patient denies smoking herself, but states that she may have been exposed to secondhand smoke at home and \" bad air\". The patient has had various admissions to various facilities with respiratory distress, during a recent admission to [facility], the patient was intubated. On arrival to the ER, the patient has been placed on BiPAP. The patient states that her breathing has been getting worse for the last few weeks. The patient complains of cough, but does not bring out any phlegm. The patient denies any chest pain, any palpitations, any nausea or vomiting. No history of any  fever or chills. No history of any headaches, numbness, or focal weakness. No history of any abdominal pain, urinary complaints, diarrhea or rectal bleeding. No history of any leg swelling. No history of any rash. The patient denies any illicit drug use. No Known Allergies   Social History   Substance Use Topics    Smoking status: Never Smoker    Smokeless tobacco: Never Used    Alcohol use No      Comment: socially      No family history on file.      Current Facility-Administered Medications   Medication Dose Route Frequency    ALPRAZolam (XANAX) tablet 1 mg  1 mg Oral BID    bumetanide (BUMEX) tablet 0.5 mg  0.5 mg Oral Q48H    famotidine (PEPCID) tablet 20 mg  20 mg Oral DAILY    FLUoxetine (PROzac) capsule 60 mg  60 mg Oral DAILY    heparin (porcine) injection 5,000 Units  5,000 Units SubCUTAneous Q8H    arformoterol (BROVANA) neb solution 15 mcg  15 mcg Nebulization BID RT    budesonide (PULMICORT) 500 mcg/2 ml nebulizer suspension  500 mcg Nebulization BID RT    methylPREDNISolone (PF) (SOLU-MEDROL) injection 60 mg  60 mg IntraVENous Q6H    insulin lispro (HUMALOG) injection   SubCUTAneous AC&HS    amLODIPine (NORVASC) tablet 10 mg  10 mg Oral DAILY       Review of Systems:  A comprehensive review of systems was negative except for that written in the HPI. Objective:   Vital Signs:    Visit Vitals    /79 (BP 1 Location: Left arm, BP Patient Position: At rest)    Pulse 83    Temp 98 °F (36.7 °C)    Resp 19    SpO2 99%    Breastfeeding No       O2 Device: Nasal cannula   O2 Flow Rate (L/min): 2 l/min   Temp (24hrs), Av °F (36.7 °C), Min:97.5 °F (36.4 °C), Max:98.3 °F (36.8 °C)       Intake/Output:   Last shift:         Last 3 shifts:  1901 -  0700  In: 460 [P.O.:460]  Out: 850 [Urine:850]    Intake/Output Summary (Last 24 hours) at 17 1008  Last data filed at 17 0044   Gross per 24 hour   Intake              460 ml   Output              850 ml   Net             -390 ml      Physical Exam:   General:  Alert, cooperative, no distress, appears stated age. Head:  Normocephalic, without obvious abnormality, atraumatic. Eyes:  Conjunctivae/corneas clear. PERRL, ANicteric   Nose: Nares normal. Mucosa normal. No drainage or sinus tenderness. Throat: Lips, mucosa dry. NO thrush;    Neck: Supple, symmetrical, trachea midline, no adenopathy, thyroid: no enlargment/tenderness/nodules, no carotid bruit and no JVD. No crepitus   Back:   Symmetric, no curvature, no spine tenderness or flank pain   Lungs:   Bilateral auscultation - decreased breath sounds with exp wheezing   Chest wall:  No tenderness or deformity. NO CREPITUS   Heart:  Regular rate and rhythm, S1, S2 normal, no murmur, click, rub or gallop. Abdomen:   Soft, non-tender. PROTUBERANT; Bowel sounds normal. No masses,  No organomegaly.  No paradox Extremities: normal, atraumatic, no cyanosis or edema. Pulses: 1-2+ and symmetric all extremities. Skin: Skin color, texture, turgor normal. No rashes or lesions, scratch and itch marks   Lymph nodes: Cervical, supraclavicular, and axillary nodes normal.   Neurologic: Grossly nonfocal         Data review:   Labs:  Recent Results (from the past 24 hour(s))   GLUCOSE, POC    Collection Time: 03/03/17  3:54 PM   Result Value Ref Range    Glucose (POC) 165 (H) 70 - 110 mg/dL   GLUCOSE, POC    Collection Time: 03/03/17  9:13 PM   Result Value Ref Range    Glucose (POC) 126 (H) 70 - 823 mg/dL   METABOLIC PANEL, BASIC    Collection Time: 03/04/17  2:40 AM   Result Value Ref Range    Sodium 137 136 - 145 mmol/L    Potassium 5.1 3.5 - 5.5 mmol/L    Chloride 106 100 - 108 mmol/L    CO2 21 21 - 32 mmol/L    Anion gap 10 3.0 - 18 mmol/L    Glucose 143 (H) 74 - 99 mg/dL    BUN 20 (H) 7.0 - 18 MG/DL    Creatinine 1.06 0.6 - 1.3 MG/DL    BUN/Creatinine ratio 19 12 - 20      GFR est AA >60 >60 ml/min/1.73m2    GFR est non-AA 54 (L) >60 ml/min/1.73m2    Calcium 7.6 (L) 8.5 - 10.1 MG/DL   GLUCOSE, POC    Collection Time: 03/04/17  5:55 AM   Result Value Ref Range    Glucose (POC) 147 (H) 70 - 110 mg/dL       Imaging:  I have personally reviewed the patients radiographs and have reviewed the reports:  CXR Results  (Last 48 hours)               03/03/17 0447  XR CHEST PORT Final result    Impression:  Impression:   1. No acute cardiopulmonary process. Narrative:  EXAM: Chest Radiograph       INDICATION: Dyspnea       TECHNIQUE: AP view of the chest       COMPARISON: 3/2/2017, 2/28/2017, 2/26/2017 and 1/26/2017       FINDINGS: No pneumothorax identified. The lungs are clear. No infiltrates   appreciated. No effusions identified. The cardiomediastinal silhouette is   unremarkable. The pulmonary vasculature is unremarkable. The osseous   structures are unremarkable.                     High complexity decision making was performed in this consultation and evaluation of this patient who is at high risk for decompensation with multiple organ involvement       Lane Seaman MD

## 2017-03-04 NOTE — PROGRESS NOTES
[facility] Hospitalist Group  Progress Note    Patient: Singh Dasilva Age: 45 y.o. : 1965 MR#: [id number] SSN: [id number]  Date/Time: 3/4/2017 10:00 AM    Subjective/24-hour events:     No new complaints. Still with significant wheezing, but no acute SOB. Denies chest pain. Assessment:   Severe persistent asthma with acute exacerbation  Chronic pain issues with ongoing prescription narcotic use  Morbid obesity    Plan:  Continue IV/inhaled steroid, BD, singulair, pulmonary hygiene as ordered. BPs remain elevated despite increase in norvasc yesterday. Continue to monitor, but will likely need additional adjustments to current antihypertensive regimen. Potassium creeping up. F/U this evening. Analgesia as ordered - no IV narcotics. Needs PCP. Will arrange prior to discharge. Case discussed with:  [x]Patient  []Family  []Nursing  []Case Management  DVT Prophylaxis:  []Lovenox  []Hep SQ  []SCDs  []Coumadin   []On Heparin gtt    Objective:   VS:   Visit Vitals    /79 (BP 1 Location: Left arm, BP Patient Position: At rest)    Pulse 83    Temp 98 °F (36.7 °C)    Resp 19    SpO2 99%    Breastfeeding No      Tmax/24hrs: Temp (24hrs), Av °F (36.7 °C), Min:97.5 °F (36.4 °C), Max:98.3 °F (36.8 °C)      Intake/Output Summary (Last 24 hours) at 17 1000  Last data filed at 17 0044   Gross per 24 hour   Intake              460 ml   Output              850 ml   Net             -390 ml       General:  In NAD. Cardiovascular: RRR. Pulmonary:  Diffuse wheezing bilaterally. GI:  Abdomen soft, NTTP. Extremities:  Warm, no ischemia. Neuro:  Awake and alert.   Motor nonfocal.    Labs:    Recent Results (from the past 24 hour(s))   GLUCOSE, POC    Collection Time: 17  3:54 PM   Result Value Ref Range    Glucose (POC) 165 (H) 70 - 110 mg/dL   GLUCOSE, POC    Collection Time: 17  9:13 PM   Result Value Ref Range    Glucose (POC) 126 (H) 70 - 110 mg/dL   METABOLIC PANEL, BASIC    Collection Time: 03/04/17  2:40 AM   Result Value Ref Range    Sodium 137 136 - 145 mmol/L    Potassium 5.1 3.5 - 5.5 mmol/L    Chloride 106 100 - 108 mmol/L    CO2 21 21 - 32 mmol/L    Anion gap 10 3.0 - 18 mmol/L    Glucose 143 (H) 74 - 99 mg/dL    BUN 20 (H) 7.0 - 18 MG/DL    Creatinine 1.06 0.6 - 1.3 MG/DL    BUN/Creatinine ratio 19 12 - 20      GFR est AA >60 >60 ml/min/1.73m2    GFR est non-AA 54 (L) >60 ml/min/1.73m2    Calcium 7.6 (L) 8.5 - 10.1 MG/DL   GLUCOSE, POC    Collection Time: 03/04/17  5:55 AM   Result Value Ref Range    Glucose (POC) 147 (H) 70 - 110 mg/dL       Signed By: Stacey Hensley MD     March 4, 2017 10:00 AM

## 2017-03-05 LAB
ANION GAP BLD CALC-SCNC: 5 MMOL/L (ref 3–18)
BUN SERPL-MCNC: 23 MG/DL (ref 7–18)
BUN/CREAT SERPL: 25 (ref 12–20)
CALCIUM SERPL-MCNC: 8.5 MG/DL (ref 8.5–10.1)
CHLORIDE SERPL-SCNC: 102 MMOL/L (ref 100–108)
CO2 SERPL-SCNC: 30 MMOL/L (ref 21–32)
CREAT SERPL-MCNC: 0.93 MG/DL (ref 0.6–1.3)
GLUCOSE BLD STRIP.AUTO-MCNC: 118 MG/DL (ref 70–110)
GLUCOSE BLD STRIP.AUTO-MCNC: 126 MG/DL (ref 70–110)
GLUCOSE BLD STRIP.AUTO-MCNC: 127 MG/DL (ref 70–110)
GLUCOSE BLD STRIP.AUTO-MCNC: 143 MG/DL (ref 70–110)
GLUCOSE SERPL-MCNC: 147 MG/DL (ref 74–99)
POTASSIUM SERPL-SCNC: 4.7 MMOL/L (ref 3.5–5.5)
SODIUM SERPL-SCNC: 137 MMOL/L (ref 136–145)

## 2017-03-05 RX ORDER — HYDRALAZINE HYDROCHLORIDE 25 MG/1
25 TABLET, FILM COATED ORAL 3 TIMES DAILY
Status: DISCONTINUED | OUTPATIENT
Start: 2017-03-05 | End: 2017-03-07

## 2017-03-05 RX ADMIN — AMLODIPINE BESYLATE 10 MG: 10 TABLET ORAL at 08:09

## 2017-03-05 RX ADMIN — OXYCODONE HYDROCHLORIDE AND ACETAMINOPHEN 2 TABLET: 5; 325 TABLET ORAL at 08:08

## 2017-03-05 RX ADMIN — BUMETANIDE 0.5 MG: 0.5 TABLET ORAL at 08:08

## 2017-03-05 RX ADMIN — HYDRALAZINE HYDROCHLORIDE 25 MG: 25 TABLET, FILM COATED ORAL at 21:28

## 2017-03-05 RX ADMIN — ALBUTEROL SULFATE 2.5 MG: 2.5 SOLUTION RESPIRATORY (INHALATION) at 10:53

## 2017-03-05 RX ADMIN — FAMOTIDINE 20 MG: 20 TABLET ORAL at 08:08

## 2017-03-05 RX ADMIN — METHYLPREDNISOLONE SODIUM SUCCINATE 60 MG: 125 INJECTION, POWDER, FOR SOLUTION INTRAMUSCULAR; INTRAVENOUS at 11:00

## 2017-03-05 RX ADMIN — ALPRAZOLAM 1 MG: 1 TABLET ORAL at 17:14

## 2017-03-05 RX ADMIN — METHYLPREDNISOLONE SODIUM SUCCINATE 60 MG: 125 INJECTION, POWDER, FOR SOLUTION INTRAMUSCULAR; INTRAVENOUS at 17:14

## 2017-03-05 RX ADMIN — ALPRAZOLAM 1 MG: 1 TABLET ORAL at 08:09

## 2017-03-05 RX ADMIN — ARFORMOTEROL TARTRATE 15 MCG: 15 SOLUTION RESPIRATORY (INHALATION) at 20:45

## 2017-03-05 RX ADMIN — ALBUTEROL SULFATE 2.5 MG: 2.5 SOLUTION RESPIRATORY (INHALATION) at 17:14

## 2017-03-05 RX ADMIN — HYDRALAZINE HYDROCHLORIDE 25 MG: 25 TABLET, FILM COATED ORAL at 17:14

## 2017-03-05 RX ADMIN — METHYLPREDNISOLONE SODIUM SUCCINATE 60 MG: 125 INJECTION, POWDER, FOR SOLUTION INTRAMUSCULAR; INTRAVENOUS at 05:29

## 2017-03-05 RX ADMIN — BUDESONIDE 500 MCG: 0.5 INHALANT RESPIRATORY (INHALATION) at 20:45

## 2017-03-05 RX ADMIN — OXYCODONE HYDROCHLORIDE AND ACETAMINOPHEN 2 TABLET: 5; 325 TABLET ORAL at 21:28

## 2017-03-05 RX ADMIN — OXYCODONE HYDROCHLORIDE AND ACETAMINOPHEN 2 TABLET: 5; 325 TABLET ORAL at 03:29

## 2017-03-05 RX ADMIN — OXYCODONE HYDROCHLORIDE AND ACETAMINOPHEN 2 TABLET: 5; 325 TABLET ORAL at 14:59

## 2017-03-05 RX ADMIN — FLUOXETINE 60 MG: 20 CAPSULE ORAL at 08:08

## 2017-03-05 RX ADMIN — METHYLPREDNISOLONE SODIUM SUCCINATE 60 MG: 125 INJECTION, POWDER, FOR SOLUTION INTRAMUSCULAR; INTRAVENOUS at 23:08

## 2017-03-05 NOTE — PROGRESS NOTES
Bedside and Verbal shift change report given to Sammi WAGGONER (oncoming nurse) by Ham Ji (offgoing nurse). Report included the following information SBAR, Kardex, MAR and Recent Results. SITUATION:    Code Status: Full Code   Reason for Admission: COPD exacerbation ([facility]    [facility] day: 2   Problem List:       Hospital Problems  Date Reviewed: 3/3/2017          Codes Class Noted POA    COPD exacerbation ([facility] [address] ICD-10-CM: J44.1  ICD-9-CM: 491.21  3/12/2016 Unknown              BACKGROUND:    Past Medical History:   Past Medical History:   Diagnosis Date    Asthma     Chronic obstructive pulmonary disease ([facility] [address]     Endocrine disease     thyroid issues    Gastrointestinal disorder     \"blockage in my stomach\"    Hypertension          Patient taking anticoagulants no     ASSESSMENT:    Changes in Assessment Throughout Shift: no      Patient has Central Line: no    Patient has Walker Cath: no       Last Vitals:     Vitals:    03/05/17 0321 03/05/17 0800 03/05/17 1150 03/05/17 1650   BP: 163/81 (!) 173/94 159/78    Pulse: 73 73 84 79   Resp: 20 18 19 20   Temp: 98.2 °F (36.8 °C) 97.7 °F (36.5 °C) 98 °F (36.7 °C) 99.1 °F (37.3 °C)   SpO2: 99% 98% 97% 100%        IV and DRAINS (will only show if present)   [REMOVED] Peripheral IV 03/03/17 Left Hand-Site Assessment: Clean, dry, & intact  Peripheral IV 03/03/17 Right Antecubital-Site Assessment: Clean, dry, & intact     WOUND (if present)   Wound Type:  none   Dressing present Dressing Present : No   Wound Concerns/Notes:  none     PAIN    Pain Assessment    Pain Intensity 1: 9 (03/05/17 1500)    Pain Location 1: Rib cage    Pain Intervention(s) 1: Medication (see MAR)    Patient Stated Pain Goal: 0  o Interventions for Pain:  Yes percocet  o Intervention effective: yes  o Time of last intervention: 3/05/2017 @ 14:59  o Reassessment Completed: no      Last 3 Weights: There were no vitals filed for this visit.   Weight change:  INTAKE/OUPUT    Current Shift: 03/05 0701 - 03/05 1900  In: -   Out: 300 [Urine:300]    Last three shifts: 03/03 1901 - 03/05 0700  In: 260 [P.O.:260]  Out: 2400 [Urine:2400]     LAB RESULTS     Recent Labs      03/03/17   0505   WBC  11.9   HGB  15.0   HCT  46.1*   PLT  235        Recent Labs      03/05/17   0239  03/04/17   1834  03/04/17   0240  03/03/17   0505   NA  137   --   137  139   K  4.7  4.4  5.1  4.0   GLU  147*   --   143*  96   BUN  23*   --   20*  39*   CREA  0.93   --   1.06  1.68*   CA  8.5   --   7.6*  8.4*       RECOMMENDATIONS AND DISCHARGE PLANNING     1. Pending tests/procedures/ Plan of Care or Other Needs: no     2. Discharge plan for patient and Needs/Barriers: no    3. Estimated Discharge Date: n/a Posted on Whiteboard in Patients Room: no      4. The patient's care plan was reviewed with the oncoming nurse. \"HEALS\" SAFETY CHECK      Fall Risk    Total Score: 2    Safety Measures: Safety Measures: Bed/Chair-Wheels locked, Bed in low position, Call light within reach, Fall prevention (comment), Gripper socks, Side rails X2    A safety check occurred in the patient's room between off going nurse and oncoming nurse listed above. The safety check included the below items  Area Items   H  High Alert Medications - Verify all high alert medication drips (heparin, PCA, etc.)   E  Equipment - Suction is set up for ALL patients (with yanker)  - Red plugs utilized for all equipment (IV pumps, etc.)  - WOWs wiped down at end of shift.  - Room stocked with oxygen, suction, and other unit-specific supplies   A  Alarms - Bed alarm is set for fall risk patients  - Ensure chair alarm is in place and activated if patient is up in a chair   L  Lines - Check IV for any infiltration  - Walker bag is empty if patient has a Walker   - Tubing and IV bags are labeled   S  Safety   - Room is clean, patient is clean, and equipment is clean. - Hallways are clear from equipment besides carts.    - Fall bracelet on for fall risk patients  - Ensure room is clear and free of clutter  - Suction is set up for ALL patients (with rios)  - Hallways are clear from equipment besides carts.    - Isolation precautions followed, supplies available outside room, sign posted     Oksana Wade

## 2017-03-05 NOTE — PROGRESS NOTES
Bedside and Verbal shift change report given to Sammi WAGGONER (oncoming nurse) by Paulo Santos (offgoing nurse). Report included the following information SBAR, Kardex, MAR and Recent Results. SITUATION:    Code Status: Full Code   Reason for Admission: COPD exacerbation ([facility] [address]    [facility] day: 1   Problem List:       Hospital Problems  Date Reviewed: 3/3/2017          Codes Class Noted POA    COPD exacerbation ([facility] [address] ICD-10-CM: J44.1  ICD-9-CM: 491.21  3/12/2016 Unknown              BACKGROUND:    Past Medical History:   Past Medical History:   Diagnosis Date    Asthma     Chronic obstructive pulmonary disease ([facility] [address]     Endocrine disease     thyroid issues    Gastrointestinal disorder     \"blockage in my stomach\"    Hypertension          Patient taking anticoagulants no     ASSESSMENT:    Changes in Assessment Throughout Shift: no     Patient has Central Line: no    Patient has Walker Cath: no       Last Vitals:     Vitals:    03/04/17 0029 03/04/17 0437 03/04/17 0741 03/04/17 1139   BP: 147/76 167/88 173/79 166/79   Pulse: 82 82 83 84   Resp: 19 19 19 19   Temp: 98 °F (36.7 °C) 98.2 °F (36.8 °C) 98 °F (36.7 °C) 96.9 °F (36.1 °C)   SpO2: 95% 92% 99% 99%        IV and DRAINS (will only show if present)   Peripheral IV 03/03/17 Left Hand-Site Assessment: Clean, dry, & intact  Peripheral IV 03/03/17 Right Antecubital-Site Assessment: Clean, dry, & intact     WOUND (if present)   Wound Type:  none   Dressing present Dressing Present : No   Wound Concerns/Notes:  none     PAIN    Pain Assessment    Pain Intensity 1: 10 (03/04/17 1808)    Pain Location 1: Rib cage    Pain Intervention(s) 1: Medication (see MAR)    Patient Stated Pain Goal: 0  o Interventions for Pain:  2 percocet   o Intervention effective: yes  o Time of last intervention: 18:07   o Reassessment Completed: yes      Last 3 Weights: There were no vitals filed for this visit.   Weight change:      INTAKE/OUPUT    Current Shift:      Last three shifts: 03/03 0701 - 03/04 1900  In: 26 [P.O.:460]  Out: 1450 [Urine:1450]     LAB RESULTS     Recent Labs      03/03/17   0505  03/02/17   0425   WBC  11.9  13.7*   HGB  15.0  15.1   HCT  46.1*  47.7*   PLT  235  310        Recent Labs      03/04/17   1834  03/04/17   0240  03/03/17   0505  03/02/17   0425   NA   --   137  139  138   K  4.4  5.1  4.0  3.9   GLU   --   143*  96  107*   BUN   --   20*  39*  38*   CREA   --   1.06  1.68*  3.30*   CA   --   7.6*  8.4*  8.2*       RECOMMENDATIONS AND DISCHARGE PLANNING     1. Pending tests/procedures/ Plan of Care or Other Needs: no     2. Discharge plan for patient and Needs/Barriers: no    3. Estimated Discharge Date: n/a Posted on Whiteboard in Patients Room: no      4. The patient's care plan was reviewed with the oncoming nurse. \"HEALS\" SAFETY CHECK      Fall Risk    Total Score: 2    Safety Measures: Safety Measures: Bed/Chair-Wheels locked, Bed in low position, Call light within reach, Gripper socks, Side rails X2    A safety check occurred in the patient's room between off going nurse and oncoming nurse listed above. The safety check included the below items  Area Items   H  High Alert Medications - Verify all high alert medication drips (heparin, PCA, etc.)   E  Equipment - Suction is set up for ALL patients (with yanker)  - Red plugs utilized for all equipment (IV pumps, etc.)  - WOWs wiped down at end of shift.  - Room stocked with oxygen, suction, and other unit-specific supplies   A  Alarms - Bed alarm is set for fall risk patients  - Ensure chair alarm is in place and activated if patient is up in a chair   L  Lines - Check IV for any infiltration  - Walker bag is empty if patient has a Walker   - Tubing and IV bags are labeled   S  Safety   - Room is clean, patient is clean, and equipment is clean. - Hallways are clear from equipment besides carts.    - Fall bracelet on for fall risk patients  - Ensure room is clear and free of clutter  - Suction is set up for ALL patients (with rios)  - Hallways are clear from equipment besides carts.    - Isolation precautions followed, supplies available outside room, sign posted     Bell Erps

## 2017-03-05 NOTE — PROGRESS NOTES
[facility] Hospitalist Group  Progress Note    Patient: Dalia Peabody Age: 45 y.o. : 1965 MR#: [id number] SSN: [id number]  Date/Time: 3/5/2017 9:59 AM    Subjective/24-hour events: Audible wheezing persists. Breathing not any worse, but no better as of yet. Denies chest pain. Assessment:   Severe persistent asthma with acute exacerbation  Chronic pain issues with ongoing prescription narcotic use  Hyperglycemia   Morbid obesity    Plan:  Continue IV/inhaled steroid, BD, singulair, pulmonary hygiene as ordered. Adjust antihypertensive regimen further - add scheduled hydralazine, monitor response. Potassium OK - monitor. Analgesia as ordered - no IV narcotics. Needs PCP. Will arrange prior to discharge. Case discussed with:  [x]Patient  []Family  [x]Nursing  []Case Management  DVT Prophylaxis:  []Lovenox  []Hep SQ  []SCDs  []Coumadin   []On Heparin gtt    Objective:   VS:   Visit Vitals    BP (!) 173/94 (BP 1 Location: Left arm)    Pulse 73    Temp 97.7 °F (36.5 °C)    Resp 18    SpO2 98%    Breastfeeding No      Tmax/24hrs: Temp (24hrs), Av.5 °F (36.4 °C), Min:96.9 °F (36.1 °C), Max:98.2 °F (36.8 °C)      Intake/Output Summary (Last 24 hours) at 17 1000  Last data filed at 17 0616   Gross per 24 hour   Intake              260 ml   Output             2000 ml   Net            -1740 ml       General:  In NAD. Cardiovascular: RRR. Pulmonary:  Diffuse wheezing bilaterally. Effort nonlabored. GI:  Abdomen soft, NTTP. Extremities:  Warm, no ischemia. Neuro:  Awake and alert.   Motor nonfocal.    Labs:    Recent Results (from the past 24 hour(s))   GLUCOSE, POC    Collection Time: 17 12:05 PM   Result Value Ref Range    Glucose (POC) 131 (H) 70 - 110 mg/dL   GLUCOSE, POC    Collection Time: 17  4:47 PM   Result Value Ref Range    Glucose (POC) 111 (H) 70 - 110 mg/dL   POTASSIUM    Collection Time: 17  6:34 PM   Result Value Ref Range    Potassium 4.4 3.5 - 5.5 mmol/L   GLUCOSE, POC    Collection Time: 03/04/17  9:19 PM   Result Value Ref Range    Glucose (POC) 141 (H) 70 - 231 mg/dL   METABOLIC PANEL, BASIC    Collection Time: 03/05/17  2:39 AM   Result Value Ref Range    Sodium 137 136 - 145 mmol/L    Potassium 4.7 3.5 - 5.5 mmol/L    Chloride 102 100 - 108 mmol/L    CO2 30 21 - 32 mmol/L    Anion gap 5 3.0 - 18 mmol/L    Glucose 147 (H) 74 - 99 mg/dL    BUN 23 (H) 7.0 - 18 MG/DL    Creatinine 0.93 0.6 - 1.3 MG/DL    BUN/Creatinine ratio 25 (H) 12 - 20      GFR est AA >60 >60 ml/min/1.73m2    GFR est non-AA >60 >60 ml/min/1.73m2    Calcium 8.5 8.5 - 10.1 MG/DL   GLUCOSE, POC    Collection Time: 03/05/17  6:30 AM   Result Value Ref Range    Glucose (POC) 126 (H) 70 - 110 mg/dL       Signed By: Ulisses Velásquez MD     March 5, 2017 9:59 AM

## 2017-03-05 NOTE — ROUTINE PROCESS
Pt is stable, Aox4. No distressed noted. Pt pain is managed with PO pain meds. Dyspnea will talking. On 2 liters of NC. Will continue to monitor.

## 2017-03-05 NOTE — PROGRESS NOTES
9958- No nausea or vomiting. Pain to rib cage well controlled with PO pain medication. Ambulatory to RR with no assistance. No episodes of dyspnea throughout shift.

## 2017-03-05 NOTE — ROUTINE PROCESS
Bedside and Verbal shift change report given to [address] Zepeda Se (oncoming nurse) by Darci De Los Santos RN (offgoing nurse). Report included the following information SBAR, Kardex, MAR and Recent Results. SITUATION:    Code Status: Full Code   Reason for Admission: COPD exacerbation ([facility]    [facility]   Problem List:       Hospital Problems  Date Reviewed: 3/3/2017          Codes Class Noted POA    COPD exacerbation ([facility] ICD-10-CM: J44.1  ICD-9-CM: 491.21  3/12/2016 Unknown              BACKGROUND:    Past Medical History:   Past Medical History:   Diagnosis Date    Asthma     Chronic obstructive pulmonary disease ([facility]     Endocrine disease     thyroid issues    Gastrointestinal disorder     \"blockage in my stomach\"    Hypertension          Patient taking anticoagulants no     ASSESSMENT:    Changes in Assessment Throughout Shift: none     Patient has Central Line: no Reasons if yes:    Patient has Walker Cath: no Reasons if yes:       Last Vitals:     Vitals:    03/04/17 0741 03/04/17 1139 03/04/17 2000 03/05/17 0321   BP: 173/79 166/79 161/90 163/81   Pulse: 83 84 85 73   Resp: 19 19 20 20   Temp: 98 °F (36.7 °C) 96.9 °F (36.1 °C) 97 °F (36.1 °C) 98.2 °F (36.8 °C)   SpO2: 99% 99% 96% 99%        IV and DRAINS (will only show if present)   [REMOVED] Peripheral IV 03/03/17 Left Hand-Site Assessment: Clean, dry, & intact  Peripheral IV 03/03/17 Right Antecubital-Site Assessment: Clean, dry, & intact     WOUND (if present)   Wound Type:  none   Dressing present Dressing Present : No   Wound Concerns/Notes:  none     PAIN    Pain Assessment    Pain Intensity 1: 0 (03/05/17 0351)    Pain Location 1: Rib cage    Pain Intervention(s) 1: Medication (see MAR)    Patient Stated Pain Goal: 0  o Interventions for Pain:  yes  o Intervention effective: yes  o Time of last intervention: See MAR   o Reassessment Completed: yes      Last 3 Weights: There were no vitals filed for this visit.   Weight change:  INTAKE/OUPUT    Current Shift: 03/04 1901 - 03/05 0700  In: 260 [P.O.:260]  Out: 1000 [Urine:1000]    Last three shifts: 03/03 0701 - 03/04 1900  In: 460 [P.O.:460]  Out: 1450 [Urine:1450]     LAB RESULTS     Recent Labs      03/03/17   0505   WBC  11.9   HGB  15.0   HCT  46.1*   PLT  235        Recent Labs      03/05/17   0239  03/04/17   1834  03/04/17   0240  03/03/17   0505   NA  137   --   137  139   K  4.7  4.4  5.1  4.0   GLU  147*   --   143*  96   BUN  23*   --   20*  39*   CREA  0.93   --   1.06  1.68*   CA  8.5   --   7.6*  8.4*       RECOMMENDATIONS AND DISCHARGE PLANNING     1. Pending tests/procedures/ Plan of Care or Other Needs: TBD     2. Discharge plan for patient and Needs/Barriers: TBD    3. Estimated Discharge Date: TBD Posted on Whiteboard in Patients Room: no      4. The patient's care plan was reviewed with the oncoming nurse. \"HEALS\" SAFETY CHECK      Fall Risk    Total Score: 2    Safety Measures: Safety Measures: Bed/Chair-Wheels locked, Bed in low position, Call light within reach, Side rails X 3    A safety check occurred in the patient's room between off going nurse and oncoming nurse listed above. The safety check included the below items  Area Items   H  High Alert Medications - Verify all high alert medication drips (heparin, PCA, etc.)   E  Equipment - Suction is set up for ALL patients (with yanker)  - Red plugs utilized for all equipment (IV pumps, etc.)  - WOWs wiped down at end of shift.  - Room stocked with oxygen, suction, and other unit-specific supplies   A  Alarms - Bed alarm is set for fall risk patients  - Ensure chair alarm is in place and activated if patient is up in a chair   L  Lines - Check IV for any infiltration  - Walker bag is empty if patient has a Walker   - Tubing and IV bags are labeled   S  Safety   - Room is clean, patient is clean, and equipment is clean. - Hallways are clear from equipment besides carts.    - Fall bracelet on for fall risk patients  - Ensure room is clear and free of clutter  - Suction is set up for ALL patients (with shaanker)  - Hallways are clear from equipment besides carts.    - Isolation precautions followed, supplies available outside room, sign posted     Troy Jamil RN

## 2017-03-05 NOTE — PROGRESS NOTES
[facility]. 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 0    Status asthmaticus with COPD (chronic obstructive pulmonary disease) ([facility]) J44.9, J45.902    Abnormal CT of the chest R93.8    Shortness of breath R06.02    Asthma J45.909    COPD exacerbation ([facility]) J44.1    Acute on chronic respiratory failure with hypoxemia ([facility]) J96.21    COPD with exacerbation ([facility]) J44.1    Acute exacerbation of chronic obstructive pulmonary disease (COPD) ([facility]) J44.1    Respiratory failure ([facility]) J96.90    Acute encephalopathy G93.40    Asthma exacerbation J45. 901    Acute respiratory failure with hypercapnia ([facility]) J96.02    COPD with acute exacerbation ([facility]) J44.1      RECOMMENDATIONS:   · Continue IV solumedrol 60 mg IV Q6H  · Continue Brovana and Pulmicort. · Albuterol PRN  · Continue Singulair, Spiriva, Symbicort.   · Continue BiPAP PRN  · Hallway oxymetry when patient can walk, oxygen saturation goal >88%  · Judicious anxiolytics  · Needs asthma/COPD education and action plan as well as outpatient interventions- high risk readmissions and chronic complex disease management  · Prophylaxis: DVT and PUD  · Further recommendations will be based on the patient's response to recommended treatment and results of the investigation ordered. Subjective:   03/05/17   Feels some better- still tight and coughing- not able to expectorate mucus. Denies any new complaints. HPI:  This patient has been seen and evaluated at the request of Dr. Paco Atkinson for  Acute exacerbation of Asthma. Patient is a 45 y.o. female came to ER yesterday after recent discharge from [facility] and left AMA. The patient states that she got home and very quickly she started feeling worse and paramedics were called. The  patient denies smoking herself, but states that she may have been exposed to secondhand smoke at home and \" bad air\". The patient has had various admissions to various facilities with respiratory distress, during a recent admission to [facility], the patient was intubated. On arrival to the ER, the patient has been placed on BiPAP. The patient states that her breathing has been getting worse for the last few weeks. The patient complains of cough, but does not bring out any phlegm. The patient denies any chest pain, any palpitations, any nausea or vomiting. No history of any  fever or chills. No history of any headaches, numbness, or focal weakness. No history of any abdominal pain, urinary complaints, diarrhea or rectal bleeding. No history of any leg swelling. No history of any rash. The patient denies any illicit drug use. No Known Allergies   Social History   Substance Use Topics    Smoking status: Never Smoker    Smokeless tobacco: Never Used    Alcohol use No      Comment: socially      No family history on file.      Current Facility-Administered Medications   Medication Dose Route Frequency    hydrALAZINE (APRESOLINE) tablet 25 mg  25 mg Oral TID    ALPRAZolam (XANAX) tablet 1 mg  1 mg Oral BID    bumetanide (BUMEX) tablet 0.5 mg  0.5 mg Oral Q48H    famotidine (PEPCID) tablet 20 mg  20 mg Oral DAILY    FLUoxetine (PROzac) capsule 60 mg  60 mg Oral DAILY    heparin (porcine) injection 5,000 Units  5,000 Units SubCUTAneous Q8H    arformoterol (BROVANA) neb solution 15 mcg  15 mcg Nebulization BID RT    budesonide (PULMICORT) 500 mcg/2 ml nebulizer suspension  500 mcg Nebulization BID RT    methylPREDNISolone (PF) (SOLU-MEDROL) injection 60 mg  60 mg IntraVENous Q6H    insulin lispro (HUMALOG) injection   SubCUTAneous AC&HS    amLODIPine (NORVASC) tablet 10 mg  10 mg Oral DAILY       Review of Systems:  A comprehensive review of systems was negative except for that written in the HPI. Objective:   Vital Signs:    Visit Vitals    /78 (BP 1 Location: Left arm)    Pulse 84    Temp 98 °F (36.7 °C)    Resp 19    SpO2 97%    Breastfeeding No       O2 Device: Nasal cannula   O2 Flow Rate (L/min): 2 l/min   Temp (24hrs), Av.7 °F (36.5 °C), Min:97 °F (36.1 °C), Max:98.2 °F (36.8 °C)       Intake/Output:   Last shift:      701 - 1900  In: -   Out: 300 [Urine:300]  Last 3 shifts:  190 -  0700  In: 260 [P.O.:260]  Out: 2400 [Urine:2400]    Intake/Output Summary (Last 24 hours) at 17 1208  Last data filed at 17 1110   Gross per 24 hour   Intake              260 ml   Output             2300 ml   Net            -2040 ml      Physical Exam:   General:  Alert, cooperative, no distress, appears stated age. Head:  Normocephalic, without obvious abnormality, atraumatic. Eyes:  Conjunctivae/corneas clear. PERRL, ANicteric   Nose: Nares normal. Mucosa normal. No drainage or sinus tenderness. Throat: Lips, mucosa dry. NO thrush;    Neck: Supple, symmetrical, trachea midline, no adenopathy, thyroid: no enlargment/tenderness/nodules, no carotid bruit and no JVD. No crepitus   Back:   Symmetric, no curvature, no spine tenderness or flank pain   Lungs:   Bilateral auscultation - decreased breath sounds with exp wheezing   Chest wall:  No tenderness or deformity. NO CREPITUS   Heart:  Regular rate and rhythm, S1, S2 normal, no murmur, click, rub or gallop. Abdomen:   Soft, non-tender.  PROTUBERANT; Bowel sounds normal. No masses,  No organomegaly. No paradox   Extremities: normal, atraumatic, no cyanosis or edema. Pulses: 1-2+ and symmetric all extremities.    Skin: Skin color, texture, turgor normal. No rashes or lesions, scratch and itch marks   Lymph nodes: Cervical, supraclavicular, and axillary nodes normal.   Neurologic: Grossly nonfocal         Data review:   Labs:  Recent Results (from the past 24 hour(s))   GLUCOSE, POC    Collection Time: 03/04/17  4:47 PM   Result Value Ref Range    Glucose (POC) 111 (H) 70 - 110 mg/dL   POTASSIUM    Collection Time: 03/04/17  6:34 PM   Result Value Ref Range    Potassium 4.4 3.5 - 5.5 mmol/L   GLUCOSE, POC    Collection Time: 03/04/17  9:19 PM   Result Value Ref Range    Glucose (POC) 141 (H) 70 - 802 mg/dL   METABOLIC PANEL, BASIC    Collection Time: 03/05/17  2:39 AM   Result Value Ref Range    Sodium 137 136 - 145 mmol/L    Potassium 4.7 3.5 - 5.5 mmol/L    Chloride 102 100 - 108 mmol/L    CO2 30 21 - 32 mmol/L    Anion gap 5 3.0 - 18 mmol/L    Glucose 147 (H) 74 - 99 mg/dL    BUN 23 (H) 7.0 - 18 MG/DL    Creatinine 0.93 0.6 - 1.3 MG/DL    BUN/Creatinine ratio 25 (H) 12 - 20      GFR est AA >60 >60 ml/min/1.73m2    GFR est non-AA >60 >60 ml/min/1.73m2    Calcium 8.5 8.5 - 10.1 MG/DL   GLUCOSE, POC    Collection Time: 03/05/17  6:30 AM   Result Value Ref Range    Glucose (POC) 126 (H) 70 - 110 mg/dL   GLUCOSE, POC    Collection Time: 03/05/17 11:49 AM   Result Value Ref Range    Glucose (POC) 118 (H) 70 - 110 mg/dL       Imaging:  I have personally reviewed the patients radiographs and have reviewed the reports:  CXR Results  (Last 48 hours)    None             High complexity decision making was performed in this consultation and evaluation of this patient who is at high risk for decompensation with multiple organ involvement       Magno Ortiz MD

## 2017-03-05 NOTE — ROUTINE PROCESS
Pt is stable in bed with HOB elevated. Pt exhibits dyspnea on exertion and dyspnea while talking and also has wheezing . Pt received another PRN albuertol Neb. Pt is on 2 liter NC. No distressed noted.  Will continue to monitor

## 2017-03-05 NOTE — ROUTINE PROCESS
Pt is stable,constant wheezing, dyspnea on exertion and while talking. Pain managed with po pain med's. No distressed noted. Will continue to monitor. Prn neb is being provided.

## 2017-03-06 LAB
GLUCOSE BLD STRIP.AUTO-MCNC: 130 MG/DL (ref 70–110)
GLUCOSE BLD STRIP.AUTO-MCNC: 161 MG/DL (ref 70–110)

## 2017-03-06 RX ORDER — MONTELUKAST SODIUM 10 MG/1
10 TABLET ORAL
Status: DISCONTINUED | OUTPATIENT
Start: 2017-03-06 | End: 2017-03-09 | Stop reason: HOSPADM

## 2017-03-06 RX ORDER — ACETYLCYSTEINE 200 MG/ML
400 SOLUTION ORAL; RESPIRATORY (INHALATION)
Status: DISCONTINUED | OUTPATIENT
Start: 2017-03-06 | End: 2017-03-09 | Stop reason: HOSPADM

## 2017-03-06 RX ORDER — ALBUTEROL SULFATE 0.83 MG/ML
2.5 SOLUTION RESPIRATORY (INHALATION)
Status: DISCONTINUED | OUTPATIENT
Start: 2017-03-06 | End: 2017-03-09 | Stop reason: HOSPADM

## 2017-03-06 RX ORDER — ACETYLCYSTEINE 100 MG/ML
4 SOLUTION ORAL; RESPIRATORY (INHALATION)
Status: DISCONTINUED | OUTPATIENT
Start: 2017-03-06 | End: 2017-03-06 | Stop reason: SDUPTHER

## 2017-03-06 RX ADMIN — ARFORMOTEROL TARTRATE 15 MCG: 15 SOLUTION RESPIRATORY (INHALATION) at 21:15

## 2017-03-06 RX ADMIN — BUDESONIDE 500 MCG: 0.5 INHALANT RESPIRATORY (INHALATION) at 09:08

## 2017-03-06 RX ADMIN — INSULIN LISPRO 2 UNITS: 100 INJECTION, SOLUTION INTRAVENOUS; SUBCUTANEOUS at 21:18

## 2017-03-06 RX ADMIN — METHYLPREDNISOLONE SODIUM SUCCINATE 60 MG: 125 INJECTION, POWDER, FOR SOLUTION INTRAMUSCULAR; INTRAVENOUS at 23:15

## 2017-03-06 RX ADMIN — ALBUTEROL SULFATE 2.5 MG: 2.5 SOLUTION RESPIRATORY (INHALATION) at 21:14

## 2017-03-06 RX ADMIN — HYDRALAZINE HYDROCHLORIDE 25 MG: 25 TABLET, FILM COATED ORAL at 08:00

## 2017-03-06 RX ADMIN — MONTELUKAST SODIUM 10 MG: 10 TABLET, FILM COATED ORAL at 21:14

## 2017-03-06 RX ADMIN — AMLODIPINE BESYLATE 10 MG: 10 TABLET ORAL at 08:00

## 2017-03-06 RX ADMIN — ALBUTEROL SULFATE 2.5 MG: 2.5 SOLUTION RESPIRATORY (INHALATION) at 15:52

## 2017-03-06 RX ADMIN — FAMOTIDINE 20 MG: 20 TABLET ORAL at 08:00

## 2017-03-06 RX ADMIN — HYDRALAZINE HYDROCHLORIDE 25 MG: 25 TABLET, FILM COATED ORAL at 15:36

## 2017-03-06 RX ADMIN — OXYCODONE HYDROCHLORIDE AND ACETAMINOPHEN 2 TABLET: 5; 325 TABLET ORAL at 12:54

## 2017-03-06 RX ADMIN — ACETYLCYSTEINE 400 MG: 100 INHALANT RESPIRATORY (INHALATION) at 15:52

## 2017-03-06 RX ADMIN — FLUOXETINE 60 MG: 20 CAPSULE ORAL at 08:00

## 2017-03-06 RX ADMIN — OXYCODONE HYDROCHLORIDE AND ACETAMINOPHEN 2 TABLET: 5; 325 TABLET ORAL at 19:44

## 2017-03-06 RX ADMIN — METHYLPREDNISOLONE SODIUM SUCCINATE 60 MG: 125 INJECTION, POWDER, FOR SOLUTION INTRAMUSCULAR; INTRAVENOUS at 12:48

## 2017-03-06 RX ADMIN — BUDESONIDE 500 MCG: 0.5 INHALANT RESPIRATORY (INHALATION) at 21:16

## 2017-03-06 RX ADMIN — METHYLPREDNISOLONE SODIUM SUCCINATE 60 MG: 125 INJECTION, POWDER, FOR SOLUTION INTRAMUSCULAR; INTRAVENOUS at 17:39

## 2017-03-06 RX ADMIN — ALPRAZOLAM 1 MG: 1 TABLET ORAL at 08:00

## 2017-03-06 RX ADMIN — ALPRAZOLAM 1 MG: 1 TABLET ORAL at 17:40

## 2017-03-06 RX ADMIN — ACETYLCYSTEINE 400 MG: 200 SOLUTION ORAL; RESPIRATORY (INHALATION) at 21:15

## 2017-03-06 RX ADMIN — OXYCODONE HYDROCHLORIDE AND ACETAMINOPHEN 2 TABLET: 5; 325 TABLET ORAL at 05:15

## 2017-03-06 RX ADMIN — HYDRALAZINE HYDROCHLORIDE 25 MG: 25 TABLET, FILM COATED ORAL at 21:14

## 2017-03-06 RX ADMIN — METHYLPREDNISOLONE SODIUM SUCCINATE 60 MG: 125 INJECTION, POWDER, FOR SOLUTION INTRAMUSCULAR; INTRAVENOUS at 05:15

## 2017-03-06 RX ADMIN — ARFORMOTEROL TARTRATE 15 MCG: 15 SOLUTION RESPIRATORY (INHALATION) at 09:08

## 2017-03-06 RX ADMIN — HEPARIN SODIUM 5000 UNITS: 5000 INJECTION INTRAVENOUS; SUBCUTANEOUS at 07:48

## 2017-03-06 NOTE — PROGRESS NOTES
6815- No nausea or vomiting. Pain to rib cage controlled with PO pain medication. Ambulatory with no assistance to RR.

## 2017-03-06 NOTE — PROGRESS NOTES
[facility]. Pulmonary, Critical Care, and Sleep Medicine    Progress note    Name: Melida Nguyen MRN: [id number]   : 1965 Hospital: [address] [place] [address] Dr   Date: 3/6/2017        IMPRESSION:   · Acute asthma exacerbation in the setting of known history of severe asthma. Still with significant obstructive bronchitis symptoms. 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 0    Status asthmaticus with COPD (chronic obstructive pulmonary disease) ([facility]) J44.9, J45.902    Abnormal CT of the chest R93.8    Shortness of breath R06.02    Asthma J45.909    COPD exacerbation ([facility]) J44.1    Acute on chronic respiratory failure with hypoxemia ([facility]) J96.21    COPD with exacerbation ([facility]) J44.1    Acute exacerbation of chronic obstructive pulmonary disease (COPD) ([facility]) J44.1    Respiratory failure ([facility]) J96.90    Acute encephalopathy G93.40    Asthma exacerbation J45. 901    Acute respiratory failure with hypercapnia ([facility]) J96.02    COPD with acute exacerbation ([facility]) J44.1      RECOMMENDATIONS:   · Continue IV solumedrol 60 mg IV Q6H  · Continue Brovana and Pulmicort. · Albuterol and mucomyst QID for next 2-3 days  · Continue Singulair, Spiriva, Symbicort.   · Hallway oxymetry when patient can walk, oxygen saturation goal >88%  · Judicious anxiolytics  · Needs asthma/COPD education and action plan as well as outpatient interventions- high risk readmissions and chronic complex disease management  · Prophylaxis: DVT and PUD  · Further recommendations will be based on the patient's response to recommended treatment and results of the investigation ordered. Subjective:   03/06/17   Feels some better- still tight and coughing- not able to expectorate mucus. Denies any new complaints. Has not been OOB    HPI:  This patient has been seen and evaluated at the request of Dr. Maryellen Pablo for  Acute exacerbation of Asthma. Patient is a 45 y.o. female came to ER yesterday after recent discharge from [facility] and left AMA. The patient states that she got home and very quickly she started feeling worse and paramedics were called. The  patient denies smoking herself, but states that she may have been exposed to secondhand smoke at home and \" bad air\". The patient has had various admissions to various facilities with respiratory distress, during a recent admission to [facility], the patient was intubated. On arrival to the ER, the patient has been placed on BiPAP. The patient states that her breathing has been getting worse for the last few weeks. The patient complains of cough, but does not bring out any phlegm. The patient denies any chest pain, any palpitations, any nausea or vomiting. No history of any  fever or chills. No history of any headaches, numbness, or focal weakness. No history of any abdominal pain, urinary complaints, diarrhea or rectal bleeding. No history of any leg swelling. No history of any rash. The patient denies any illicit drug use. No Known Allergies   Social History   Substance Use Topics    Smoking status: Never Smoker    Smokeless tobacco: Never Used    Alcohol use No      Comment: socially      No family history on file.      Current Facility-Administered Medications   Medication Dose Route Frequency    acetylcysteine (MUCOMYST) 100 mg/mL (10 %) nebulizer solution 400 mg  4 mL Nebulization QID RT    albuterol (PROVENTIL VENTOLIN) nebulizer solution 2.5 mg  2.5 mg Nebulization QID RT    hydrALAZINE (APRESOLINE) tablet 25 mg  25 mg Oral TID    ALPRAZolam (XANAX) tablet 1 mg  1 mg Oral BID    bumetanide (BUMEX) tablet 0.5 mg  0.5 mg Oral Q48H    famotidine (PEPCID) tablet 20 mg  20 mg Oral DAILY    FLUoxetine (PROzac) capsule 60 mg  60 mg Oral DAILY    heparin (porcine) injection 5,000 Units  5,000 Units SubCUTAneous Q8H    arformoterol (BROVANA) neb solution 15 mcg  15 mcg Nebulization BID RT    budesonide (PULMICORT) 500 mcg/2 ml nebulizer suspension  500 mcg Nebulization BID RT    methylPREDNISolone (PF) (SOLU-MEDROL) injection 60 mg  60 mg IntraVENous Q6H    insulin lispro (HUMALOG) injection   SubCUTAneous AC&HS    amLODIPine (NORVASC) tablet 10 mg  10 mg Oral DAILY       Review of Systems:  A comprehensive review of systems was negative except for that written in the HPI. Objective:   Vital Signs:    Visit Vitals    /83 (BP 1 Location: Left arm, BP Patient Position: Supine)    Pulse 72    Temp 97.7 °F (36.5 °C)    Resp 22    SpO2 99%    Breastfeeding No       O2 Device: Nasal cannula   O2 Flow Rate (L/min): 2.5 l/min   Temp (24hrs), Av.1 °F (36.7 °C), Min:97.5 °F (36.4 °C), Max:99.1 °F (37.3 °C)       Intake/Output:   Last shift:         Last 3 shifts:  1901 -  0700  In: 260 [P.O.:260]  Out: 1700 [Urine:1700]    Intake/Output Summary (Last 24 hours) at 17 1106  Last data filed at 17 1110   Gross per 24 hour   Intake                0 ml   Output              300 ml   Net             -300 ml      Physical Exam:   General:  Alert, cooperative, no distress, appears stated age. Head:  Normocephalic, without obvious abnormality, atraumatic. Eyes:  Conjunctivae/corneas clear. PERRL, ANicteric   Nose: Nares normal. Mucosa normal. No drainage or sinus tenderness. Throat: Lips, mucosa dry. NO thrush;    Neck: Supple, symmetrical, trachea midline, no adenopathy, thyroid: no enlargment/tenderness/nodules, no carotid bruit and no JVD.  No crepitus   Back:   Symmetric, no curvature, no spine tenderness or flank pain   Lungs: Bilateral auscultation - decreased breath sounds with exp wheezing   Chest wall:  No tenderness or deformity. NO CREPITUS   Heart:  Regular rate and rhythm, S1, S2 normal, no murmur, click, rub or gallop. Abdomen:   Soft, non-tender. PROTUBERANT; Bowel sounds normal. No masses,  No organomegaly. No paradox   Extremities: normal, atraumatic, no cyanosis or edema. Pulses: 1-2+ and symmetric all extremities.    Skin: Skin color, texture, turgor normal. No rashes or lesions, scratch and itch marks   Lymph nodes: Cervical, supraclavicular, and axillary nodes normal.   Neurologic: Grossly nonfocal         Data review:   Labs:  Recent Results (from the past 24 hour(s))   GLUCOSE, POC    Collection Time: 03/05/17 11:49 AM   Result Value Ref Range    Glucose (POC) 118 (H) 70 - 110 mg/dL   GLUCOSE, POC    Collection Time: 03/05/17  4:54 PM   Result Value Ref Range    Glucose (POC) 127 (H) 70 - 110 mg/dL   GLUCOSE, POC    Collection Time: 03/05/17  9:31 PM   Result Value Ref Range    Glucose (POC) 143 (H) 70 - 110 mg/dL   GLUCOSE, POC    Collection Time: 03/06/17  6:24 AM   Result Value Ref Range    Glucose (POC) 130 (H) 70 - 110 mg/dL       Imaging:  I have personally reviewed the patients radiographs and have reviewed the reports:  CXR Results  (Last 48 hours)    None             High complexity decision making was performed in this consultation and evaluation of this patient who is at high risk for decompensation with multiple organ involvement       Ricco Clemente MD

## 2017-03-06 NOTE — PROGRESS NOTES
[facility]  Progress Note    Patient: Roda Kanner Age: 45 y.o. : 1965 MR#: [id number] SSN: [id number]  Date/Time: 3/6/2017 1:59 PM    Subjective:     Feels minimally better. Still SOB and very easily ARAGON. No F/C, N/V, CP. Assessment/Plan:   1. Acute hypoxic and hypercarbic resp failure due to asthma exac with chronic bronchitis - per pulmonology. Maintain steroids, Brovana, Pulmicort, Singulair, scheduled nebs and Mucomyst. Asthma is severe, persistent. 2. Chronic pain - hx of narcotic abuse and drug-seeking behavior. No change in current med tx.  3. Chronic hypoxic resp failure - 2lpm via NC around-the-clock. 4. HTN - BP remains elevated. Increase hydralazine if remains elevated tomorrow. 5. Morbid obesity - noted. Additional Notes:      Case discussed with:  [x]Patient  []Family  []Nursing  []Case Management  DVT Prophylaxis:  []Lovenox  [x]Hep SQ  []SCDs  []Coumadin   []On Heparin gtt    Objective:   VS:   Visit Vitals    /70 (BP 1 Location: Left arm, BP Patient Position: At rest;Supine)    Pulse 86    Temp 98.2 °F (36.8 °C)    Resp 24    SpO2 99%    Breastfeeding No      Tmax/24hrs: Temp (24hrs), Av.1 °F (36.7 °C), Min:97.5 °F (36.4 °C), Max:99.1 °F (37.3 °C)    Intake/Output Summary (Last 24 hours) at 17 1359  Last data filed at 17 0908   Gross per 24 hour   Intake              420 ml   Output                0 ml   Net              420 ml       General:  Awake, alert, NAD. Cardiovascular:  RRR. Pulmonary:  CTA B with diffuse exp wheeze. GI:  Soft, NT/ND, NABS. Extremities:  No CT or edema.    Additional:      Labs:    Recent Results (from the past 24 hour(s))   GLUCOSE, POC    Collection Time: 17  4:54 PM   Result Value Ref Range    Glucose (POC) 127 (H) 70 - 110 mg/dL   GLUCOSE, POC    Collection Time: 17  9:31 PM   Result Value Ref Range    Glucose (POC) 143 (H) 70 - 110 mg/dL   GLUCOSE, POC Collection Time: 03/06/17  6:24 AM   Result Value Ref Range    Glucose (POC) 130 (H) 70 - 110 mg/dL       Signed By: Aline Hernandez MD     March 6, 2017 1:59 PM

## 2017-03-06 NOTE — PROGRESS NOTES
Visited with Ms Naseem Prieto who was sitting up in bed, eating meal. Introduced self and role, offered to come again but asked to remain. Discussed her thoughts on frequent exacerbations of asthma/COPD. Discussed home environment with second hand smoke. She showed flyer for a local \"rescue\" she would like to speak with. Reviewed disease process and importance of compliance and benefit of pulmonary f/u. She agreed to visit tomorrow.

## 2017-03-06 NOTE — CDMP QUERY
A diagnosis of Acute Respiratory Failure is documented in   H&P     3/3/17\  The record reflects:   \"  Acute hypoxic-hypercarbic respiratory failure \"     Clinical indicators for Acute Respiratory Failure include:      Respirations >28, air hunger, use of accessory muscles of respiration,   inability to speak in full sentences,   cyanosis, pulse ox <90% RA or <95% on O2,   - pH <7.35 or >7.45 ,    pO2 < 60 mm Hg (or 10mm below COPD patient's baseline) and a   pCO2 >50mm Hg (or 10mm above COPD patient's baseline)          Currently  this diagnosis  is not documented on subsequent  progress notes. Please provide more clinical information to support this diagnosis  or consider  removing  this diagnosis,. Thank you !!!      QUESTIONS?    Milan Wallace RN BSN  CCDS [phone]

## 2017-03-06 NOTE — ROUTINE PROCESS
Bedside and Verbal shift change report given to [address] Zepeda Se (oncoming nurse) by Lina Biswas RN (offgoing nurse). Report included the following information SBAR, Kardex, MAR and Recent Results. SITUATION:    Code Status: Full Code   Reason for Admission: COPD exacerbation ([facility] [address]    [facility] day: 3   Problem List:       Hospital Problems  Date Reviewed: 3/3/2017          Codes Class Noted POA    COPD exacerbation ([facility] [address] ICD-10-CM: J44.1  ICD-9-CM: 491.21  3/12/2016 Unknown              BACKGROUND:    Past Medical History:   Past Medical History:   Diagnosis Date    Asthma     Chronic obstructive pulmonary disease ([facility] [address]     Endocrine disease     thyroid issues    Gastrointestinal disorder     \"blockage in my stomach\"    Hypertension          Patient taking anticoagulants no     ASSESSMENT:    Changes in Assessment Throughout Shift: yes     Patient has Central Line: no Reasons if yes:    Patient has Walker Cath: no Reasons if yes:       Last Vitals:     Vitals:    03/05/17 1650 03/05/17 1906 03/05/17 1954 03/06/17 0259   BP:  140/76 158/88 176/90   Pulse: 79  90 71   Resp: 20  20 20   Temp: 99.1 °F (37.3 °C)  97.5 °F (36.4 °C) 98 °F (36.7 °C)   SpO2: 100%   94%        IV and DRAINS (will only show if present)   [REMOVED] Peripheral IV 03/03/17 Left Hand-Site Assessment: Clean, dry, & intact  Peripheral IV 03/03/17 Right Antecubital-Site Assessment: Clean, dry, & intact     WOUND (if present)   Wound Type:  none   Dressing present Dressing Present : No   Wound Concerns/Notes:  none     PAIN    Pain Assessment    Pain Intensity 1: 0 (03/06/17 0544)    Pain Location 1: Rib cage    Pain Intervention(s) 1: Medication (see MAR)    Patient Stated Pain Goal: 0  o Interventions for Pain:  yes  o Intervention effective: yes  o Time of last intervention: See MAR   o Reassessment Completed: yes      Last 3 Weights: There were no vitals filed for this visit.   Weight change:      INTAKE/OUPUT    Current Shift:      Last three shifts: 03/04 0701 - 03/05 1900  In: 260 [P.O.:260]  Out: 2300 [Urine:2300]     LAB RESULTS     No results for input(s): WBC, HGB, HCT, PLT, HGBEXT, HCTEXT, PLTEXT, HGBEXT, HCTEXT, PLTEXT in the last 72 hours. Recent Labs      03/05/17   0239  03/04/17   1834  03/04/17   0240   NA  137   --   137   K  4.7  4.4  5.1   GLU  147*   --   143*   BUN  23*   --   20*   CREA  0.93   --   1.06   CA  8.5   --   7.6*       RECOMMENDATIONS AND DISCHARGE PLANNING     1. Pending tests/procedures/ Plan of Care or Other Needs: TBD     2. Discharge plan for patient and Needs/Barriers: TBD    3. Estimated Discharge Date: TBD Posted on Whiteboard in Patients Room: no      4. The patient's care plan was reviewed with the oncoming nurse. \"HEALS\" SAFETY CHECK      Fall Risk    Total Score: 2    Safety Measures: Safety Measures: Bed/Chair-Wheels locked, Bed in low position, Call light within reach, Side rails X 3    A safety check occurred in the patient's room between off going nurse and oncoming nurse listed above. The safety check included the below items  Area Items   H  High Alert Medications - Verify all high alert medication drips (heparin, PCA, etc.)   E  Equipment - Suction is set up for ALL patients (with yanker)  - Red plugs utilized for all equipment (IV pumps, etc.)  - WOWs wiped down at end of shift.  - Room stocked with oxygen, suction, and other unit-specific supplies   A  Alarms - Bed alarm is set for fall risk patients  - Ensure chair alarm is in place and activated if patient is up in a chair   L  Lines - Check IV for any infiltration  - Walker bag is empty if patient has a Walker   - Tubing and IV bags are labeled   S  Safety   - Room is clean, patient is clean, and equipment is clean. - Hallways are clear from equipment besides carts.    - Fall bracelet on for fall risk patients  - Ensure room is clear and free of clutter  - Suction is set up for ALL patients (with rios)  - Hallways are clear from equipment besides carts.    - Isolation precautions followed, supplies available outside room, sign posted     Layne Plunkett RN

## 2017-03-07 LAB
ANION GAP BLD CALC-SCNC: 4 MMOL/L (ref 3–18)
BASOPHILS # BLD AUTO: 0 K/UL (ref 0–0.1)
BASOPHILS # BLD: 0 % (ref 0–2)
BUN SERPL-MCNC: 22 MG/DL (ref 7–18)
BUN/CREAT SERPL: 22 (ref 12–20)
CALCIUM SERPL-MCNC: 8.1 MG/DL (ref 8.5–10.1)
CHLORIDE SERPL-SCNC: 102 MMOL/L (ref 100–108)
CO2 SERPL-SCNC: 33 MMOL/L (ref 21–32)
CREAT SERPL-MCNC: 1.01 MG/DL (ref 0.6–1.3)
DIFFERENTIAL METHOD BLD: ABNORMAL
EOSINOPHIL # BLD: 0 K/UL (ref 0–0.4)
EOSINOPHIL NFR BLD: 0 % (ref 0–5)
ERYTHROCYTE [DISTWIDTH] IN BLOOD BY AUTOMATED COUNT: 13 % (ref 11.6–14.5)
GLUCOSE BLD STRIP.AUTO-MCNC: 151 MG/DL (ref 70–110)
GLUCOSE BLD STRIP.AUTO-MCNC: 202 MG/DL (ref 70–110)
GLUCOSE SERPL-MCNC: 151 MG/DL (ref 74–99)
HCT VFR BLD AUTO: 42.1 % (ref 35–45)
HGB BLD-MCNC: 13.7 G/DL (ref 12–16)
LYMPHOCYTES # BLD AUTO: 6 % (ref 21–52)
LYMPHOCYTES # BLD: 0.8 K/UL (ref 0.9–3.6)
MCH RBC QN AUTO: 28.4 PG (ref 24–34)
MCHC RBC AUTO-ENTMCNC: 32.5 G/DL (ref 31–37)
MCV RBC AUTO: 87.3 FL (ref 74–97)
MONOCYTES # BLD: 0.3 K/UL (ref 0.05–1.2)
MONOCYTES NFR BLD AUTO: 3 % (ref 3–10)
NEUTS SEG # BLD: 11.3 K/UL (ref 1.8–8)
NEUTS SEG NFR BLD AUTO: 91 % (ref 40–73)
PLATELET # BLD AUTO: 247 K/UL (ref 135–420)
PMV BLD AUTO: 11 FL (ref 9.2–11.8)
POTASSIUM SERPL-SCNC: 4.6 MMOL/L (ref 3.5–5.5)
RBC # BLD AUTO: 4.82 M/UL (ref 4.2–5.3)
SODIUM SERPL-SCNC: 139 MMOL/L (ref 136–145)
WBC # BLD AUTO: 12.4 K/UL (ref 4.6–13.2)

## 2017-03-07 RX ORDER — HYDRALAZINE HYDROCHLORIDE 50 MG/1
50 TABLET, FILM COATED ORAL 3 TIMES DAILY
Status: DISCONTINUED | OUTPATIENT
Start: 2017-03-07 | End: 2017-03-09 | Stop reason: HOSPADM

## 2017-03-07 RX ADMIN — ALPRAZOLAM 1 MG: 1 TABLET ORAL at 08:26

## 2017-03-07 RX ADMIN — FAMOTIDINE 20 MG: 20 TABLET ORAL at 08:27

## 2017-03-07 RX ADMIN — ALPRAZOLAM 1 MG: 1 TABLET ORAL at 17:59

## 2017-03-07 RX ADMIN — OXYCODONE HYDROCHLORIDE AND ACETAMINOPHEN 2 TABLET: 5; 325 TABLET ORAL at 15:35

## 2017-03-07 RX ADMIN — HYDRALAZINE HYDROCHLORIDE 25 MG: 25 TABLET, FILM COATED ORAL at 08:27

## 2017-03-07 RX ADMIN — BUMETANIDE 0.5 MG: 0.5 TABLET ORAL at 08:26

## 2017-03-07 RX ADMIN — ACETYLCYSTEINE: 200 SOLUTION ORAL; RESPIRATORY (INHALATION) at 09:23

## 2017-03-07 RX ADMIN — METHYLPREDNISOLONE SODIUM SUCCINATE 60 MG: 125 INJECTION, POWDER, FOR SOLUTION INTRAMUSCULAR; INTRAVENOUS at 11:19

## 2017-03-07 RX ADMIN — ARFORMOTEROL TARTRATE 15 MCG: 15 SOLUTION RESPIRATORY (INHALATION) at 19:43

## 2017-03-07 RX ADMIN — BUDESONIDE 500 MCG: 0.5 INHALANT RESPIRATORY (INHALATION) at 09:22

## 2017-03-07 RX ADMIN — ALBUTEROL SULFATE 2.5 MG: 2.5 SOLUTION RESPIRATORY (INHALATION) at 19:43

## 2017-03-07 RX ADMIN — OXYCODONE HYDROCHLORIDE AND ACETAMINOPHEN 2 TABLET: 5; 325 TABLET ORAL at 21:42

## 2017-03-07 RX ADMIN — OXYCODONE HYDROCHLORIDE AND ACETAMINOPHEN 2 TABLET: 5; 325 TABLET ORAL at 04:34

## 2017-03-07 RX ADMIN — METHYLPREDNISOLONE SODIUM SUCCINATE 60 MG: 125 INJECTION, POWDER, FOR SOLUTION INTRAMUSCULAR; INTRAVENOUS at 18:00

## 2017-03-07 RX ADMIN — METHYLPREDNISOLONE SODIUM SUCCINATE 60 MG: 125 INJECTION, POWDER, FOR SOLUTION INTRAMUSCULAR; INTRAVENOUS at 23:55

## 2017-03-07 RX ADMIN — ALBUTEROL SULFATE 2.5 MG: 2.5 SOLUTION RESPIRATORY (INHALATION) at 09:22

## 2017-03-07 RX ADMIN — MONTELUKAST SODIUM 10 MG: 10 TABLET, FILM COATED ORAL at 21:42

## 2017-03-07 RX ADMIN — ALBUTEROL SULFATE 2.5 MG: 2.5 SOLUTION RESPIRATORY (INHALATION) at 12:17

## 2017-03-07 RX ADMIN — FLUOXETINE 60 MG: 20 CAPSULE ORAL at 08:26

## 2017-03-07 RX ADMIN — INSULIN LISPRO 4 UNITS: 100 INJECTION, SOLUTION INTRAVENOUS; SUBCUTANEOUS at 21:42

## 2017-03-07 RX ADMIN — ARFORMOTEROL TARTRATE 15 MCG: 15 SOLUTION RESPIRATORY (INHALATION) at 09:22

## 2017-03-07 RX ADMIN — AMLODIPINE BESYLATE 10 MG: 10 TABLET ORAL at 08:26

## 2017-03-07 RX ADMIN — INSULIN LISPRO 2 UNITS: 100 INJECTION, SOLUTION INTRAVENOUS; SUBCUTANEOUS at 06:31

## 2017-03-07 RX ADMIN — ALBUTEROL SULFATE 2.5 MG: 2.5 SOLUTION RESPIRATORY (INHALATION) at 15:33

## 2017-03-07 RX ADMIN — HYDRALAZINE HYDROCHLORIDE 50 MG: 50 TABLET, FILM COATED ORAL at 17:59

## 2017-03-07 RX ADMIN — ACETYLCYSTEINE 400 MG: 200 SOLUTION ORAL; RESPIRATORY (INHALATION) at 19:52

## 2017-03-07 RX ADMIN — HYDRALAZINE HYDROCHLORIDE 50 MG: 50 TABLET, FILM COATED ORAL at 21:42

## 2017-03-07 RX ADMIN — BUDESONIDE 500 MCG: 0.5 INHALANT RESPIRATORY (INHALATION) at 19:43

## 2017-03-07 RX ADMIN — METHYLPREDNISOLONE SODIUM SUCCINATE 60 MG: 125 INJECTION, POWDER, FOR SOLUTION INTRAMUSCULAR; INTRAVENOUS at 05:13

## 2017-03-07 NOTE — PROGRESS NOTES
[facility]  Progress Note    Patient: Antony Estrada Age: 45 y.o. : 1965 MR#: [id number] SSN: [id number]  Date/Time: 3/7/2017 1:59 PM    Subjective:     Sleeping comfortable. Rouses to door knock, and begins to sound wheezy. Reports feeling less SOB, but remains ARAGON. No F/C, N/V, CP. Assessment/Plan:   1. Acute hypoxic and hypercarbic resp failure due to asthma exac with chronic bronchitis - per pulmonology. Maintain steroids, Brovana, Pulmicort, Singulair, scheduled nebs and Mucomyst. Asthma is severe, persistent. Improving very slowly. 2. Chronic pain - hx of narcotic abuse and drug-seeking behavior. No change in current med tx.   3. Chronic hypoxic resp failure - 2lpm via NC around-the-clock. 4. HTN - BP remains elevated. Increased hydralazine. 5. Morbid obesity - noted. Additional Notes:      Case discussed with:  [x]Patient  []Family  []Nursing  []Case Management  DVT Prophylaxis:  []Lovenox  [x]Hep SQ  []SCDs  []Coumadin   []On Heparin gtt    Objective:   VS:   Visit Vitals    /81 (BP 1 Location: Left arm, BP Patient Position: Supine)    Pulse 86    Temp 97.5 °F (36.4 °C)    Resp 24    SpO2 99%    Breastfeeding No      Tmax/24hrs: Temp (24hrs), Av.1 °F (36.7 °C), Min:97.5 °F (36.4 °C), Max:99.1 °F (37.3 °C)      Intake/Output Summary (Last 24 hours) at 17 1233  Last data filed at 17 1008   Gross per 24 hour   Intake             1600 ml   Output             2000 ml   Net             -400 ml       General:  Awake, alert, NAD. Cardiovascular:  RRR. Pulmonary:  CTA B with diffuse exp wheeze. GI:  Soft, NT/ND, NABS. Extremities:  No CT or edema.    Additional:      Labs:    Recent Results (from the past 24 hour(s))   GLUCOSE, POC    Collection Time: 17  9:18 PM   Result Value Ref Range    Glucose (POC) 161 (H) 70 - 715 mg/dL   METABOLIC PANEL, BASIC    Collection Time: 17  2:41 AM   Result Value Ref Range Sodium 139 136 - 145 mmol/L    Potassium 4.6 3.5 - 5.5 mmol/L    Chloride 102 100 - 108 mmol/L    CO2 33 (H) 21 - 32 mmol/L    Anion gap 4 3.0 - 18 mmol/L    Glucose 151 (H) 74 - 99 mg/dL    BUN 22 (H) 7.0 - 18 MG/DL    Creatinine 1.01 0.6 - 1.3 MG/DL    BUN/Creatinine ratio 22 (H) 12 - 20      GFR est AA >60 >60 ml/min/1.73m2    GFR est non-AA 58 (L) >60 ml/min/1.73m2    Calcium 8.1 (L) 8.5 - 10.1 MG/DL   CBC WITH AUTOMATED DIFF    Collection Time: 03/07/17  5:10 AM   Result Value Ref Range    WBC 12.4 4.6 - 13.2 K/uL    RBC 4.82 4.20 - 5.30 M/uL    HGB 13.7 12.0 - 16.0 g/dL    HCT 42.1 35.0 - 45.0 %    MCV 87.3 74.0 - 97.0 FL    MCH 28.4 24.0 - 34.0 PG    MCHC 32.5 31.0 - 37.0 g/dL    RDW 13.0 11.6 - 14.5 %    PLATELET 373 484 - 329 K/uL    MPV 11.0 9.2 - 11.8 FL    NEUTROPHILS 91 (H) 40 - 73 %    LYMPHOCYTES 6 (L) 21 - 52 %    MONOCYTES 3 3 - 10 %    EOSINOPHILS 0 0 - 5 %    BASOPHILS 0 0 - 2 %    ABS. NEUTROPHILS 11.3 (H) 1.8 - 8.0 K/UL    ABS. LYMPHOCYTES 0.8 (L) 0.9 - 3.6 K/UL    ABS. MONOCYTES 0.3 0.05 - 1.2 K/UL    ABS. EOSINOPHILS 0.0 0.0 - 0.4 K/UL    ABS.  BASOPHILS 0.0 0.0 - 0.1 K/UL    DF AUTOMATED     GLUCOSE, POC    Collection Time: 03/07/17  6:05 AM   Result Value Ref Range    Glucose (POC) 151 (H) 70 - 110 mg/dL       Signed By: Zaira Bolivar MD     March 7, 2017 1:59 PM

## 2017-03-07 NOTE — PROGRESS NOTES
4253- No nausea or vomiting. Pain to rib cage well controlled with PO pain medications. Ambulated to RR ad tess. Dyspnea on exertion persists. Pt continues to refuse Heparin. Educated Pt on purpose of med.

## 2017-03-07 NOTE — ROUTINE PROCESS
Bedside and Verbal shift change report given to [address]ep[address] Se (oncoming nurse) by Anjali Correa RN (offgoing nurse). Report included the following information SBAR, Kardex, MAR and Recent Results. SITUATION:    Code Status: Full Code   Reason for Admission: COPD exacerbation ([facility] [address]    [facility] day: 4   Problem List:       Hospital Problems  Date Reviewed: 3/3/2017          Codes Class Noted POA    COPD exacerbation ([facility] [address] ICD-10-CM: J44.1  ICD-9-CM: 491.21  3/12/2016 Unknown              BACKGROUND:    Past Medical History:   Past Medical History:   Diagnosis Date    Asthma     Chronic obstructive pulmonary disease ([facility] [address]     Endocrine disease     thyroid issues    Gastrointestinal disorder     \"blockage in my stomach\"    Hypertension          Patient taking anticoagulants no     ASSESSMENT:    Changes in Assessment Throughout Shift: yes     Patient has Central Line: no Reasons if yes:    Patient has Walker Cath: no Reasons if yes:       Last Vitals:     Vitals:    03/06/17 1555 03/06/17 1629 03/06/17 1958 03/07/17 0400   BP:  157/85 169/84 164/86   Pulse:  95 86 90   Resp:  22 20 18   Temp:  99.1 °F (37.3 °C) 98.2 °F (36.8 °C) 98.1 °F (36.7 °C)   SpO2: 99% 98% 96% 94%        IV and DRAINS (will only show if present)   [REMOVED] Peripheral IV 03/03/17 Left Hand-Site Assessment: Clean, dry, & intact  Peripheral IV 03/03/17 Right Antecubital-Site Assessment: Clean, dry, & intact     WOUND (if present)   Wound Type:  none   Dressing present Dressing Present : No   Wound Concerns/Notes:  none     PAIN    Pain Assessment    Pain Intensity 1: 0 (03/07/17 0503)    Pain Location 1: Rib cage    Pain Intervention(s) 1: Medication (see MAR)    Patient Stated Pain Goal: 0  o Interventions for Pain:  yes  o Intervention effective: yes  o Time of last intervention: See MAR   o Reassessment Completed: yes      Last 3 Weights: There were no vitals filed for this visit.   Weight change:  INTAKE/OUPUT    Current Shift: 03/06 1901 - 03/07 0700  In: 480 [P.O.:480]  Out: 800 [Urine:800]    Last three shifts: 03/05 0701 - 03/06 1900  In: 1000 [P.O.:1000]  Out: 900 [Urine:900]     LAB RESULTS     Recent Labs      03/07/17   0510   WBC  12.4   HGB  13.7   HCT  42.1   PLT  247        Recent Labs      03/07/17   0241  03/05/17   0239  03/04/17   1834   NA  139  137   --    K  4.6  4.7  4.4   GLU  151*  147*   --    BUN  22*  23*   --    CREA  1.01  0.93   --    CA  8.1*  8.5   --        RECOMMENDATIONS AND DISCHARGE PLANNING     1. Pending tests/procedures/ Plan of Care or Other Needs: TBD     2. Discharge plan for patient and Needs/Barriers: TBD    3. Estimated Discharge Date: TBD Posted on Whiteboard in Patients Room: no      4. The patient's care plan was reviewed with the oncoming nurse. \"HEALS\" SAFETY CHECK      Fall Risk    Total Score: 2    Safety Measures: Safety Measures: Bed/Chair-Wheels locked, Bed in low position, Call light within reach, Side rails X 3    A safety check occurred in the patient's room between off going nurse and oncoming nurse listed above. The safety check included the below items  Area Items   H  High Alert Medications - Verify all high alert medication drips (heparin, PCA, etc.)   E  Equipment - Suction is set up for ALL patients (with yanker)  - Red plugs utilized for all equipment (IV pumps, etc.)  - WOWs wiped down at end of shift.  - Room stocked with oxygen, suction, and other unit-specific supplies   A  Alarms - Bed alarm is set for fall risk patients  - Ensure chair alarm is in place and activated if patient is up in a chair   L  Lines - Check IV for any infiltration  - Walker bag is empty if patient has a Walker   - Tubing and IV bags are labeled   S  Safety   - Room is clean, patient is clean, and equipment is clean. - Hallways are clear from equipment besides carts.    - Fall bracelet on for fall risk patients  - Ensure room is clear and free of clutter  - Suction is set up for ALL patients (with rios)  - Hallways are clear from equipment besides carts.    - Isolation precautions followed, supplies available outside room, sign posted     Yamilex Altamirano RN

## 2017-03-07 NOTE — PROGRESS NOTES
[facility]. Pulmonary, Critical Care, and Sleep Medicine    Progress note    Name: Mike Anthony MRN: [id number]   : 1965 Hospital: [address] [place] [address] Dr   Date: 3/7/2017        IMPRESSION:   · Acute asthma exacerbation in the setting of known history of severe persistent asthma. Still with significant obstructive bronchitis symptoms. 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 0    Status asthmaticus with COPD (chronic obstructive pulmonary disease) ([facility]) J44.9, J45.902    Abnormal CT of the chest R93.8    Shortness of breath R06.02    Asthma J45.909    COPD exacerbation ([facility]) J44.1    Acute on chronic respiratory failure with hypoxemia ([facility]) J96.21    COPD with exacerbation ([facility]) J44.1    Acute exacerbation of chronic obstructive pulmonary disease (COPD) ([facility]) J44.1    Respiratory failure ([facility]) J96.90    Acute encephalopathy G93.40    Asthma exacerbation J45. 901    Acute respiratory failure with hypercapnia ([facility]) J96.02    COPD with acute exacerbation ([facility]) J44.1      RECOMMENDATIONS:   · Continue IV solumedrol 60 mg IV Q6H  · Continue Brovana and Pulmicort. · Albuterol and mucomyst QID for next 2-3 days  · Continue Singulair, Spiriva, Symbicort.   · Hallway oxymetry when patient can walk, oxygen saturation goal >88%  · Judicious anxiolytics  · Needs asthma/COPD education and action plan as well as outpatient interventions- high risk readmissions and chronic complex disease management  · Prophylaxis: DVT and PUD  · Further recommendations will be based on the patient's response to recommended treatment and results of the investigation ordered. Subjective:   03/07/17   Feels some better- still tight and coughing- not able to expectorate mucus. Denies any new complaints. Has not been OOB    HPI:  This patient has been seen and evaluated at the request of Dr. Adam Daley for  Acute exacerbation of Asthma. Patient is a 45 y.o. female came to ER yesterday after recent discharge from [facility] and left AMA. The patient states that she got home and very quickly she started feeling worse and paramedics were called. The  patient denies smoking herself, but states that she may have been exposed to secondhand smoke at home and \" bad air\". The patient has had various admissions to various facilities with respiratory distress, during a recent admission to [facility], the patient was intubated. On arrival to the ER, the patient has been placed on BiPAP. The patient states that her breathing has been getting worse for the last few weeks. The patient complains of cough, but does not bring out any phlegm. The patient denies any chest pain, any palpitations, any nausea or vomiting. No history of any  fever or chills. No history of any headaches, numbness, or focal weakness. No history of any abdominal pain, urinary complaints, diarrhea or rectal bleeding. No history of any leg swelling. No history of any rash. The patient denies any illicit drug use. No Known Allergies   Social History   Substance Use Topics    Smoking status: Never Smoker    Smokeless tobacco: Never Used    Alcohol use No      Comment: socially      No family history on file.      Current Facility-Administered Medications   Medication Dose Route Frequency    albuterol (PROVENTIL VENTOLIN) nebulizer solution 2.5 mg  2.5 mg Nebulization QID RT    montelukast (SINGULAIR) tablet 10 mg  10 mg Oral QHS    acetylcysteine (MUCOMYST) 200 mg/mL (20 %) solution 400 mg  400 mg Nebulization QID RT    hydrALAZINE (APRESOLINE) tablet 25 mg  25 mg Oral TID    ALPRAZolam (XANAX) tablet 1 mg  1 mg Oral BID    bumetanide (BUMEX) tablet 0.5 mg  0.5 mg Oral Q48H    famotidine (PEPCID) tablet 20 mg  20 mg Oral DAILY    FLUoxetine (PROzac) capsule 60 mg  60 mg Oral DAILY    heparin (porcine) injection 5,000 Units  5,000 Units SubCUTAneous Q8H    arformoterol (BROVANA) neb solution 15 mcg  15 mcg Nebulization BID RT    budesonide (PULMICORT) 500 mcg/2 ml nebulizer suspension  500 mcg Nebulization BID RT    methylPREDNISolone (PF) (SOLU-MEDROL) injection 60 mg  60 mg IntraVENous Q6H    insulin lispro (HUMALOG) injection   SubCUTAneous AC&HS    amLODIPine (NORVASC) tablet 10 mg  10 mg Oral DAILY       Review of Systems:  A comprehensive review of systems was negative except for that written in the HPI. Objective:   Vital Signs:    Visit Vitals    /78 (BP 1 Location: Left arm, BP Patient Position: At rest;Supine)    Pulse 92    Temp 97.8 °F (36.6 °C)    Resp 20    SpO2 98%    Breastfeeding No       O2 Device: Nasal cannula   O2 Flow Rate (L/min): 2 l/min   Temp (24hrs), Av.3 °F (36.8 °C), Min:97.8 °F (36.6 °C), Max:99.1 °F (37.3 °C)       Intake/Output:   Last shift:      701 - 1900  In: 540 [P.O.:540]  Out: 600 [Urine:600]  Last 3 shifts:  190 -  0700  In: 1480 [P.O.:1480]  Out: 1400 [Urine:1400]    Intake/Output Summary (Last 24 hours) at 17 1107  Last data filed at 17 1008   Gross per 24 hour   Intake             1600 ml   Output             2000 ml   Net             -400 ml      Physical Exam:   General:  Alert, cooperative, no distress, appears stated age. Head:  Normocephalic, without obvious abnormality, atraumatic. Eyes:  Conjunctivae/corneas clear. PERRL, ANicteric   Nose: Nares normal. Mucosa normal. No drainage or sinus tenderness. Throat: Lips, mucosa dry.  NO thrush;    Neck: Supple, symmetrical, trachea midline, no adenopathy, thyroid: no enlargment/tenderness/nodules, no carotid bruit and no JVD. No crepitus   Back:   Symmetric, no curvature, no spine tenderness or flank pain   Lungs:   Bilateral auscultation - decreased breath sounds with exp wheezing   Chest wall:  No tenderness or deformity. NO CREPITUS   Heart:  Regular rate and rhythm, S1, S2 normal, no murmur, click, rub or gallop. Abdomen:   Soft, non-tender. PROTUBERANT; Bowel sounds normal. No masses,  No organomegaly. No paradox   Extremities: normal, atraumatic, no cyanosis or edema. Pulses: 1-2+ and symmetric all extremities.    Skin: Skin color, texture, turgor normal. No rashes or lesions, scratch and itch marks   Lymph nodes: Cervical, supraclavicular, and axillary nodes normal.   Neurologic: Grossly nonfocal         Data review:   Labs:  Recent Results (from the past 24 hour(s))   GLUCOSE, POC    Collection Time: 03/06/17  9:18 PM   Result Value Ref Range    Glucose (POC) 161 (H) 70 - 428 mg/dL   METABOLIC PANEL, BASIC    Collection Time: 03/07/17  2:41 AM   Result Value Ref Range    Sodium 139 136 - 145 mmol/L    Potassium 4.6 3.5 - 5.5 mmol/L    Chloride 102 100 - 108 mmol/L    CO2 33 (H) 21 - 32 mmol/L    Anion gap 4 3.0 - 18 mmol/L    Glucose 151 (H) 74 - 99 mg/dL    BUN 22 (H) 7.0 - 18 MG/DL    Creatinine 1.01 0.6 - 1.3 MG/DL    BUN/Creatinine ratio 22 (H) 12 - 20      GFR est AA >60 >60 ml/min/1.73m2    GFR est non-AA 58 (L) >60 ml/min/1.73m2    Calcium 8.1 (L) 8.5 - 10.1 MG/DL   CBC WITH AUTOMATED DIFF    Collection Time: 03/07/17  5:10 AM   Result Value Ref Range    WBC 12.4 4.6 - 13.2 K/uL    RBC 4.82 4.20 - 5.30 M/uL    HGB 13.7 12.0 - 16.0 g/dL    HCT 42.1 35.0 - 45.0 %    MCV 87.3 74.0 - 97.0 FL    MCH 28.4 24.0 - 34.0 PG    MCHC 32.5 31.0 - 37.0 g/dL    RDW 13.0 11.6 - 14.5 %    PLATELET 987 546 - 778 K/uL    MPV 11.0 9.2 - 11.8 FL    NEUTROPHILS 91 (H) 40 - 73 %    LYMPHOCYTES 6 (L) 21 - 52 %    MONOCYTES 3 3 - 10 %    EOSINOPHILS 0 0 - 5 %    BASOPHILS 0 0 - 2 % ABS. NEUTROPHILS 11.3 (H) 1.8 - 8.0 K/UL    ABS. LYMPHOCYTES 0.8 (L) 0.9 - 3.6 K/UL    ABS. MONOCYTES 0.3 0.05 - 1.2 K/UL    ABS. EOSINOPHILS 0.0 0.0 - 0.4 K/UL    ABS.  BASOPHILS 0.0 0.0 - 0.1 K/UL    DF AUTOMATED     GLUCOSE, POC    Collection Time: 03/07/17  6:05 AM   Result Value Ref Range    Glucose (POC) 151 (H) 70 - 110 mg/dL       Imaging:  I have personally reviewed the patients radiographs and have reviewed the reports:  CXR Results  (Last 48 hours)    None             High complexity decision making was performed in this consultation and evaluation of this patient who is at high risk for decompensation with multiple organ involvement       Lesa Scott MD

## 2017-03-07 NOTE — DIABETES MGMT
GLYCEMIC CONTROL PLAN OF CARE    Assessment/Recommendations:  Pt without past medical history of DM but is receiving 60 mg solu-medrol every 6 hours and is on correctional lispro ACHS. Will monitor pt for intervention. If BG trends above target range recommend add no conc sweets to current cardiac diet. Will continue to monitor inpatient for intervention. Most recent blood glucose values:  Results for Leroy Coon (MRN [id number]) as of 3/7/2017 13:21   Ref.  Range 3/5/2017 21:31 3/6/2017 06:24 3/6/2017 21:18 3/7/2017 06:05   GLUCOSE,FAST - POC Latest Ref Range: 70 - 110 mg/dL 143 (H) 130 (H) 161 (H) 151 (H)     Current A1C:  4.9% (1/27/17) equivalent  to ave Blood Glucose of 94mg/dl for 2-3 months prior to admission    Current hospital diabetes medications:  Correctional lispro ACHS- normal insulin sensitivity scale    Previous day Insulin TDD:  3/6: 2 units correctional lispro    Diet:  Cardiac  Current Meal Intake:  Patient Vitals for the past 100 hrs:   % Diet Eaten   03/07/17 1004 100 %   03/06/17 1739 100 %   03/06/17 0908 95 %   03/03/17 1644 100 %     Goals:  Pt BG will be within target range by _3/10/17____    Education:  ___  Refer to Diabetes Education Record             _X__  Education not indicated at this time    Gisella Infante [address], [address], [facility]  Pager: [phone]

## 2017-03-08 LAB
GLUCOSE BLD STRIP.AUTO-MCNC: 163 MG/DL (ref 70–110)
GLUCOSE BLD STRIP.AUTO-MCNC: 167 MG/DL (ref 70–110)
GLUCOSE BLD STRIP.AUTO-MCNC: 170 MG/DL (ref 70–110)
GLUCOSE BLD STRIP.AUTO-MCNC: 178 MG/DL (ref 70–110)

## 2017-03-08 RX ADMIN — ARFORMOTEROL TARTRATE 15 MCG: 15 SOLUTION RESPIRATORY (INHALATION) at 21:11

## 2017-03-08 RX ADMIN — INSULIN LISPRO 2 UNITS: 100 INJECTION, SOLUTION INTRAVENOUS; SUBCUTANEOUS at 06:32

## 2017-03-08 RX ADMIN — HYDRALAZINE HYDROCHLORIDE 50 MG: 50 TABLET, FILM COATED ORAL at 15:41

## 2017-03-08 RX ADMIN — HYDRALAZINE HYDROCHLORIDE 50 MG: 50 TABLET, FILM COATED ORAL at 08:19

## 2017-03-08 RX ADMIN — HYDRALAZINE HYDROCHLORIDE 50 MG: 50 TABLET, FILM COATED ORAL at 22:02

## 2017-03-08 RX ADMIN — FLUOXETINE 60 MG: 20 CAPSULE ORAL at 08:19

## 2017-03-08 RX ADMIN — METHYLPREDNISOLONE SODIUM SUCCINATE 60 MG: 125 INJECTION, POWDER, FOR SOLUTION INTRAMUSCULAR; INTRAVENOUS at 15:39

## 2017-03-08 RX ADMIN — BUDESONIDE 500 MCG: 0.5 INHALANT RESPIRATORY (INHALATION) at 21:11

## 2017-03-08 RX ADMIN — ALPRAZOLAM 1 MG: 1 TABLET ORAL at 08:18

## 2017-03-08 RX ADMIN — HEPARIN SODIUM 5000 UNITS: 5000 INJECTION INTRAVENOUS; SUBCUTANEOUS at 23:48

## 2017-03-08 RX ADMIN — METHYLPREDNISOLONE SODIUM SUCCINATE 60 MG: 125 INJECTION, POWDER, FOR SOLUTION INTRAMUSCULAR; INTRAVENOUS at 23:48

## 2017-03-08 RX ADMIN — ALBUTEROL SULFATE 2.5 MG: 2.5 SOLUTION RESPIRATORY (INHALATION) at 21:11

## 2017-03-08 RX ADMIN — OXYCODONE HYDROCHLORIDE AND ACETAMINOPHEN 2 TABLET: 5; 325 TABLET ORAL at 19:45

## 2017-03-08 RX ADMIN — ALPRAZOLAM 1 MG: 1 TABLET ORAL at 17:42

## 2017-03-08 RX ADMIN — OXYCODONE HYDROCHLORIDE AND ACETAMINOPHEN 2 TABLET: 5; 325 TABLET ORAL at 04:30

## 2017-03-08 RX ADMIN — INSULIN LISPRO 2 UNITS: 100 INJECTION, SOLUTION INTRAVENOUS; SUBCUTANEOUS at 22:02

## 2017-03-08 RX ADMIN — METHYLPREDNISOLONE SODIUM SUCCINATE 60 MG: 125 INJECTION, POWDER, FOR SOLUTION INTRAMUSCULAR; INTRAVENOUS at 17:42

## 2017-03-08 RX ADMIN — ALBUTEROL SULFATE 2.5 MG: 2.5 SOLUTION RESPIRATORY (INHALATION) at 10:20

## 2017-03-08 RX ADMIN — ARFORMOTEROL TARTRATE 15 MCG: 15 SOLUTION RESPIRATORY (INHALATION) at 10:20

## 2017-03-08 RX ADMIN — AMLODIPINE BESYLATE 10 MG: 10 TABLET ORAL at 08:18

## 2017-03-08 RX ADMIN — OXYCODONE HYDROCHLORIDE AND ACETAMINOPHEN 2 TABLET: 5; 325 TABLET ORAL at 13:37

## 2017-03-08 RX ADMIN — BUDESONIDE 500 MCG: 0.5 INHALANT RESPIRATORY (INHALATION) at 10:20

## 2017-03-08 RX ADMIN — METHYLPREDNISOLONE SODIUM SUCCINATE 60 MG: 125 INJECTION, POWDER, FOR SOLUTION INTRAMUSCULAR; INTRAVENOUS at 05:20

## 2017-03-08 RX ADMIN — MONTELUKAST SODIUM 10 MG: 10 TABLET, FILM COATED ORAL at 22:02

## 2017-03-08 RX ADMIN — HEPARIN SODIUM 5000 UNITS: 5000 INJECTION INTRAVENOUS; SUBCUTANEOUS at 15:40

## 2017-03-08 RX ADMIN — FAMOTIDINE 20 MG: 20 TABLET ORAL at 08:19

## 2017-03-08 RX ADMIN — INSULIN LISPRO 2 UNITS: 100 INJECTION, SOLUTION INTRAVENOUS; SUBCUTANEOUS at 17:43

## 2017-03-08 NOTE — PROGRESS NOTES
@ 1422 Pt oxygen saturation was noted at 97% pt ambulated to first floor with student while on 2L O2 pt oxygen saturation was noted at 97% after ambulating. @ 1638 Pt oxygen saturation was noted at 97% pt ambulated to 1st floor with student to get food from friend while on 2L O2 pt oxygen saturation noted at 97% after ambulating.

## 2017-03-08 NOTE — PROGRESS NOTES
[facility]. Pulmonary, Critical Care, and Sleep Medicine    Progress note    Name: Alonso Tello MRN: [id number]   : 1965 Hospital: [address] [place] [address] Dr   Date: 3/8/2017        IMPRESSION:   · Acute asthma exacerbation in the setting of known history of severe persistent asthma. Still with significant obstructive bronchitis symptoms. 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 0    Status asthmaticus with COPD (chronic obstructive pulmonary disease) ([facility]) J44.9, J45.902    Abnormal CT of the chest R93.8    Shortness of breath R06.02    Asthma J45.909    COPD exacerbation ([facility]) J44.1    Acute on chronic respiratory failure with hypoxemia ([facility]) J96.21    COPD with exacerbation ([facility]) J44.1    Acute exacerbation of chronic obstructive pulmonary disease (COPD) ([facility]) J44.1    Respiratory failure ([facility]) J96.90    Acute encephalopathy G93.40    Asthma exacerbation J45. 901    Acute respiratory failure with hypercapnia ([facility]) J96.02    COPD with acute exacerbation ([facility]) J44.1      RECOMMENDATIONS:   · Continue IV solumedrol 60 mg IV Q6H- will decrease once further improved  · Continue Brovana and Pulmicort. · Albuterol and mucomyst QID for next 1-2 days  · Continue Singulair, Spiriva, Symbicort.   · Hallway oxymetry when patient can walk, oxygen saturation goal >88%  · Judicious anxiolytics  · Needs asthma/COPD education and action plan as well as outpatient interventions- high risk readmissions and chronic complex disease management  · Prophylaxis: DVT and PUD  · Further recommendations will be based on the patient's response to recommended treatment and results of the investigation ordered. Subjective:   03/08/17   Feels some better- still tight and coughing- not able to expectorate mucus. Denies any new complaints. Has not been OOB  Instructed to get [address] reports she has been denying Heparin and Insulin- discussed with patient need and she verbalizes agreement. HPI:  This patient has been seen and evaluated at the request of Dr. Lala Agrawal for  Acute exacerbation of Asthma. Patient is a 45 y.o. female came to ER yesterday after recent discharge from [facility] and left AMA. The patient states that she got home and very quickly she started feeling worse and paramedics were called. The  patient denies smoking herself, but states that she may have been exposed to secondhand smoke at home and \" bad air\". The patient has had various admissions to various facilities with respiratory distress, during a recent admission to [facility], the patient was intubated. On arrival to the ER, the patient has been placed on BiPAP. The patient states that her breathing has been getting worse for the last few weeks. The patient complains of cough, but does not bring out any phlegm. The patient denies any chest pain, any palpitations, any nausea or vomiting. No history of any  fever or chills. No history of any headaches, numbness, or focal weakness. No history of any abdominal pain, urinary complaints, diarrhea or rectal bleeding. No history of any leg swelling. No history of any rash. The patient denies any illicit drug use. No Known Allergies   Social History   Substance Use Topics    Smoking status: Never Smoker    Smokeless tobacco: Never Used    Alcohol use No      Comment: socially      No family history on file.      Current Facility-Administered Medications   Medication Dose Route Frequency    hydrALAZINE (APRESOLINE) tablet 50 mg  50 mg Oral TID    albuterol (PROVENTIL VENTOLIN) nebulizer solution 2.5 mg  2.5 mg Nebulization QID RT    montelukast (SINGULAIR) tablet 10 mg  10 mg Oral QHS    acetylcysteine (MUCOMYST) 200 mg/mL (20 %) solution 400 mg  400 mg Nebulization QID RT    ALPRAZolam (XANAX) tablet 1 mg  1 mg Oral BID    bumetanide (BUMEX) tablet 0.5 mg  0.5 mg Oral Q48H    famotidine (PEPCID) tablet 20 mg  20 mg Oral DAILY    FLUoxetine (PROzac) capsule 60 mg  60 mg Oral DAILY    heparin (porcine) injection 5,000 Units  5,000 Units SubCUTAneous Q8H    arformoterol (BROVANA) neb solution 15 mcg  15 mcg Nebulization BID RT    budesonide (PULMICORT) 500 mcg/2 ml nebulizer suspension  500 mcg Nebulization BID RT    methylPREDNISolone (PF) (SOLU-MEDROL) injection 60 mg  60 mg IntraVENous Q6H    insulin lispro (HUMALOG) injection   SubCUTAneous AC&HS    amLODIPine (NORVASC) tablet 10 mg  10 mg Oral DAILY       Review of Systems:  A comprehensive review of systems was negative except for that written in the HPI. Objective:   Vital Signs:    Visit Vitals    /86 (BP 1 Location: Left arm, BP Patient Position: At rest)    Pulse 84    Temp 97 °F (36.1 °C)    Resp 18    SpO2 97%    Breastfeeding No       O2 Device: Nasal cannula   O2 Flow Rate (L/min): 2 l/min   Temp (24hrs), Av.9 °F (36.6 °C), Min:97 °F (36.1 °C), Max:98.6 °F (37 °C)       Intake/Output:   Last shift:      701 - 1900  In: 200 [P.O.:200]  Out: -   Last 3 shifts:  190 -  0700  In: 1500 [P.O.:1500]  Out: 1850 [Urine:1850]    Intake/Output Summary (Last 24 hours) at 17 0936  Last data filed at 17 0118   Gross per 24 hour   Intake             1220 ml   Output             1050 ml   Net              170 ml      Physical Exam:   General:  Alert, cooperative, no distress, appears stated age. Head:  Normocephalic, without obvious abnormality, atraumatic. Eyes:  Conjunctivae/corneas clear.  PERRL, ANicteric   Nose: Nares normal. Mucosa normal. No drainage or sinus tenderness. Throat: Lips, mucosa dry. NO thrush;    Neck: Supple, symmetrical, trachea midline, no adenopathy, thyroid: no enlargment/tenderness/nodules, no carotid bruit and no JVD. No crepitus   Back:   Symmetric, no curvature, no spine tenderness or flank pain   Lungs:   Bilateral auscultation - decreased breath sounds with exp wheezing   Chest wall:  No tenderness or deformity. NO CREPITUS   Heart:  Regular rate and rhythm, S1, S2 normal, no murmur, click, rub or gallop. Abdomen:   Soft, non-tender. PROTUBERANT; Bowel sounds normal. No masses,  No organomegaly. No paradox   Extremities: normal, atraumatic, no cyanosis or edema. Pulses: 1-2+ and symmetric all extremities.    Skin: Skin color, texture, turgor normal. No rashes or lesions, scratch and itch marks   Lymph nodes: Cervical, supraclavicular, and axillary nodes normal.   Neurologic: Grossly nonfocal         Data review:   Labs:  Recent Results (from the past 24 hour(s))   GLUCOSE, POC    Collection Time: 03/07/17  9:40 PM   Result Value Ref Range    Glucose (POC) 202 (H) 70 - 110 mg/dL   GLUCOSE, POC    Collection Time: 03/08/17  6:32 AM   Result Value Ref Range    Glucose (POC) 163 (H) 70 - 110 mg/dL       Imaging:  I have personally reviewed the patients radiographs and have reviewed the reports:  CXR Results  (Last 48 hours)    None             High complexity decision making was performed in this consultation and evaluation of this patient who is at high risk for decompensation with multiple organ involvement       Carlos Manuel Davis MD

## 2017-03-08 NOTE — PROGRESS NOTES
[facility]  Progress Note    Patient: Alcira Charlton Age: 45 y.o. : 1965 MR#: [id number] SSN: [id number]  Date/Time: 3/8/2017 1:59 PM    Subjective:     Feels better. ARAGON with movement. Less SOB. Wheezing improved. No F/C, N/V, CP. Assessment/Plan:   1. Acute hypoxic and hypercarbic resp failure due to asthma exac with chronic bronchitis - per pulmonology. Maintain steroids, Brovana, Pulmicort, Singulair, scheduled nebs and Mucomyst. Asthma is severe, persistent. Continues to improve. 2. Chronic pain - hx of narcotic abuse and drug-seeking behavior. No change in current med tx.   3. Chronic hypoxic resp failure - 2lpm via NC around-the-clock. 4. HTN - BP remains elevated. Increased hydralazine, following for effect. 5. Morbid obesity - noted. Additional Notes:      Case discussed with:  [x]Patient  []Family  []Nursing  []Case Management  DVT Prophylaxis:  []Lovenox  [x]Hep SQ  []SCDs  []Coumadin   []On Heparin gtt    Objective:   VS:   Visit Vitals    /86 (BP 1 Location: Left arm, BP Patient Position: At rest)    Pulse 86    Temp 97.2 °F (36.2 °C)    Resp 18    SpO2 97%    Breastfeeding No      Tmax/24hrs: Temp (24hrs), Av.8 °F (36.6 °C), Min:97 °F (36.1 °C), Max:98.6 °F (37 °C)      Intake/Output Summary (Last 24 hours) at 17 1443  Last data filed at 17 0829   Gross per 24 hour   Intake              680 ml   Output              450 ml   Net              230 ml       General:  Awake, alert, NAD. Cardiovascular:  RRR. Pulmonary:  CTA B with diffuse exp wheeze. GI:  Soft, NT/ND, NABS. Extremities:  No CT or edema.    Additional:      Labs:    Recent Results (from the past 24 hour(s))   GLUCOSE, POC    Collection Time: 17  9:40 PM   Result Value Ref Range    Glucose (POC) 202 (H) 70 - 110 mg/dL   GLUCOSE, POC    Collection Time: 17  6:32 AM   Result Value Ref Range    Glucose (POC) 163 (H) 70 - 110 mg/dL GLUCOSE, POC    Collection Time: 03/08/17 11:57 AM   Result Value Ref Range    Glucose (POC) 178 (H) 70 - 110 mg/dL       Signed By: Jonathan Blake MD     March 8, 2017 1:59 PM

## 2017-03-08 NOTE — ROUTINE PROCESS
Bedside and Verbal shift change report given to [address] Zepeda Se (oncoming nurse) by Fiona Padgett RN (offgoing nurse). Report included the following information SBAR, Kardex, MAR and Recent Results. SITUATION:    Code Status: Full Code   Reason for Admission: COPD exacerbation ([facility] [address]    [facility] day: 5   Problem List:       Hospital Problems  Date Reviewed: 3/3/2017          Codes Class Noted POA    COPD exacerbation ([facility] [address] ICD-10-CM: J44.1  ICD-9-CM: 491.21  3/12/2016 Unknown              BACKGROUND:    Past Medical History:   Past Medical History:   Diagnosis Date    Asthma     Chronic obstructive pulmonary disease ([facility]     Endocrine disease     thyroid issues    Gastrointestinal disorder     \"blockage in my stomach\"    Hypertension          Patient taking anticoagulants no     ASSESSMENT:    Changes in Assessment Throughout Shift: yes     Patient has Central Line: no Reasons if yes:    Patient has Walker Cath: no Reasons if yes:       Last Vitals:     Vitals:    03/07/17 1536 03/07/17 1700 03/07/17 2007 03/08/17 0434   BP:  (!) 153/95 151/80 149/77   Pulse:  88 86 75   Resp:  22 21 20   Temp:  98.6 °F (37 °C) 98 °F (36.7 °C) 98.2 °F (36.8 °C)   SpO2: 99% 97% 90% 97%        IV and DRAINS (will only show if present)   [REMOVED] Peripheral IV 03/03/17 Left Hand-Site Assessment: Clean, dry, & intact  Peripheral IV 03/03/17 Right Antecubital-Site Assessment: Clean, dry, & intact     WOUND (if present)   Wound Type:  none   Dressing present Dressing Present : No   Wound Concerns/Notes:  none     PAIN    Pain Assessment    Pain Intensity 1: 0 (03/08/17 0502)    Pain Location 1: Rib cage    Pain Intervention(s) 1: Medication (see MAR)    Patient Stated Pain Goal: 0  o Interventions for Pain:  yes  o Intervention effective: yes  o Time of last intervention: See MAR   o Reassessment Completed: yes      Last 3 Weights: There were no vitals filed for this visit.   Weight change:  INTAKE/OUPUT    Current Shift:      Last three shifts: 03/06 0701 - 03/07 1900  In: 2500 [P.O.:2500]  Out: 2450 [Urine:2450]     LAB RESULTS     Recent Labs      03/07/17   0510   WBC  12.4   HGB  13.7   HCT  42.1   PLT  247        Recent Labs      03/07/17   0241   NA  139   K  4.6   GLU  151*   BUN  22*   CREA  1.01   CA  8.1*       RECOMMENDATIONS AND DISCHARGE PLANNING     1. Pending tests/procedures/ Plan of Care or Other Needs: TBD     2. Discharge plan for patient and Needs/Barriers: TBD    3. Estimated Discharge Date: TBD Posted on Whiteboard in Patients Room: no      4. The patient's care plan was reviewed with the oncoming nurse. \"HEALS\" SAFETY CHECK      Fall Risk    Total Score: 2    Safety Measures: Safety Measures: Bed in low position, Bed/Chair-Wheels locked, Caregiver at bedside, Call light within reach    A safety check occurred in the patient's room between off going nurse and oncoming nurse listed above. The safety check included the below items  Area Items   H  High Alert Medications - Verify all high alert medication drips (heparin, PCA, etc.)   E  Equipment - Suction is set up for ALL patients (with rios)  - Red plugs utilized for all equipment (IV pumps, etc.)  - WOWs wiped down at end of shift.  - Room stocked with oxygen, suction, and other unit-specific supplies   A  Alarms - Bed alarm is set for fall risk patients  - Ensure chair alarm is in place and activated if patient is up in a chair   L  Lines - Check IV for any infiltration  - Walker bag is empty if patient has a Walker   - Tubing and IV bags are labeled   S  Safety   - Room is clean, patient is clean, and equipment is clean. - Hallways are clear from equipment besides carts. - Fall bracelet on for fall risk patients  - Ensure room is clear and free of clutter  - Suction is set up for ALL patients (with rios)  - Hallways are clear from equipment besides carts.    - Isolation precautions followed, supplies available outside room, sign posted     Shellie Evans RN

## 2017-03-08 NOTE — PROGRESS NOTES
6228- No nausea or vomiting. Pain to rib cage well controlled with PO medication. Pt has no dyspnea upon exertion. Pt continues to refuse heparin. Ambulatory to  with no assistance.

## 2017-03-09 VITALS
TEMPERATURE: 97.5 F | BODY MASS INDEX: 53.28 KG/M2 | HEART RATE: 89 BPM | WEIGHT: 263.8 LBS | SYSTOLIC BLOOD PRESSURE: 143 MMHG | RESPIRATION RATE: 20 BRPM | OXYGEN SATURATION: 93 % | DIASTOLIC BLOOD PRESSURE: 80 MMHG

## 2017-03-09 LAB
ANION GAP BLD CALC-SCNC: 8 MMOL/L (ref 3–18)
BASOPHILS # BLD AUTO: 0 K/UL (ref 0–0.1)
BASOPHILS # BLD: 0 % (ref 0–2)
BUN SERPL-MCNC: 26 MG/DL (ref 7–18)
BUN/CREAT SERPL: 30 (ref 12–20)
CALCIUM SERPL-MCNC: 7.9 MG/DL (ref 8.5–10.1)
CHLORIDE SERPL-SCNC: 101 MMOL/L (ref 100–108)
CO2 SERPL-SCNC: 28 MMOL/L (ref 21–32)
CREAT SERPL-MCNC: 0.86 MG/DL (ref 0.6–1.3)
DIFFERENTIAL METHOD BLD: ABNORMAL
EOSINOPHIL # BLD: 0 K/UL (ref 0–0.4)
EOSINOPHIL NFR BLD: 0 % (ref 0–5)
ERYTHROCYTE [DISTWIDTH] IN BLOOD BY AUTOMATED COUNT: 13.1 % (ref 11.6–14.5)
GLUCOSE BLD STRIP.AUTO-MCNC: 134 MG/DL (ref 70–110)
GLUCOSE SERPL-MCNC: 138 MG/DL (ref 74–99)
HCT VFR BLD AUTO: 43.4 % (ref 35–45)
HGB BLD-MCNC: 14.1 G/DL (ref 12–16)
LYMPHOCYTES # BLD AUTO: 6 % (ref 21–52)
LYMPHOCYTES # BLD: 0.8 K/UL (ref 0.9–3.6)
MCH RBC QN AUTO: 28.4 PG (ref 24–34)
MCHC RBC AUTO-ENTMCNC: 32.5 G/DL (ref 31–37)
MCV RBC AUTO: 87.3 FL (ref 74–97)
MONOCYTES # BLD: 0.4 K/UL (ref 0.05–1.2)
MONOCYTES NFR BLD AUTO: 3 % (ref 3–10)
NEUTS SEG # BLD: 12.5 K/UL (ref 1.8–8)
NEUTS SEG NFR BLD AUTO: 91 % (ref 40–73)
PLATELET # BLD AUTO: 271 K/UL (ref 135–420)
PMV BLD AUTO: 11.3 FL (ref 9.2–11.8)
POTASSIUM SERPL-SCNC: 3.9 MMOL/L (ref 3.5–5.5)
RBC # BLD AUTO: 4.97 M/UL (ref 4.2–5.3)
SODIUM SERPL-SCNC: 137 MMOL/L (ref 136–145)
WBC # BLD AUTO: 13.7 K/UL (ref 4.6–13.2)

## 2017-03-09 RX ORDER — AMLODIPINE BESYLATE 10 MG/1
10 TABLET ORAL DAILY
Qty: 30 TAB | Refills: 1 | Status: ON HOLD | OUTPATIENT
Start: 2017-03-09 | End: 2019-03-14 | Stop reason: SDUPTHER

## 2017-03-09 RX ORDER — PREDNISONE 20 MG/1
TABLET ORAL
Qty: 24 TAB | Refills: 0 | Status: SHIPPED | OUTPATIENT
Start: 2017-03-09 | End: 2017-03-12

## 2017-03-09 RX ORDER — FAMOTIDINE 20 MG/1
20 TABLET, FILM COATED ORAL DAILY
Qty: 30 TAB | Refills: 1 | Status: ON HOLD | OUTPATIENT
Start: 2017-03-09 | End: 2020-03-26 | Stop reason: SDUPTHER

## 2017-03-09 RX ORDER — BUDESONIDE AND FORMOTEROL FUMARATE DIHYDRATE 160; 4.5 UG/1; UG/1
2 AEROSOL RESPIRATORY (INHALATION) 2 TIMES DAILY
Qty: 1 INHALER | Refills: 1 | Status: SHIPPED | OUTPATIENT
Start: 2017-03-09 | End: 2018-08-23

## 2017-03-09 RX ORDER — HYDRALAZINE HYDROCHLORIDE 50 MG/1
50 TABLET, FILM COATED ORAL 3 TIMES DAILY
Qty: 90 TAB | Refills: 1 | Status: SHIPPED | OUTPATIENT
Start: 2017-03-09 | End: 2019-03-14

## 2017-03-09 RX ORDER — ALPRAZOLAM 1 MG/1
1 TABLET ORAL 2 TIMES DAILY
Qty: 30 TAB | Refills: 0 | Status: ON HOLD | OUTPATIENT
Start: 2017-03-09 | End: 2017-04-24

## 2017-03-09 RX ORDER — MONTELUKAST SODIUM 10 MG/1
10 TABLET ORAL
Qty: 30 TAB | Refills: 1 | Status: ON HOLD | OUTPATIENT
Start: 2017-03-09 | End: 2020-11-30 | Stop reason: SDUPTHER

## 2017-03-09 RX ORDER — FLUOXETINE HYDROCHLORIDE 20 MG/1
60 CAPSULE ORAL DAILY
Qty: 90 CAP | Refills: 1 | Status: SHIPPED | OUTPATIENT
Start: 2017-03-09 | End: 2019-03-14

## 2017-03-09 RX ORDER — ALBUTEROL SULFATE 0.83 MG/ML
2.5 SOLUTION RESPIRATORY (INHALATION)
Qty: 60 EACH | Refills: 1 | Status: ON HOLD | OUTPATIENT
Start: 2017-03-09 | End: 2017-03-12

## 2017-03-09 RX ORDER — ARFORMOTEROL TARTRATE 15 UG/2ML
15 SOLUTION RESPIRATORY (INHALATION) 2 TIMES DAILY
Qty: 60 VIAL | Refills: 1 | Status: ON HOLD | OUTPATIENT
Start: 2017-03-09 | End: 2019-03-14 | Stop reason: SDUPTHER

## 2017-03-09 RX ORDER — BUMETANIDE 0.5 MG/1
0.5 TABLET ORAL
Qty: 30 TAB | Refills: 1 | Status: SHIPPED | OUTPATIENT
Start: 2017-03-09 | End: 2019-03-14

## 2017-03-09 RX ORDER — ALBUTEROL SULFATE 90 UG/1
2 AEROSOL, METERED RESPIRATORY (INHALATION)
Qty: 1 INHALER | Refills: 1 | Status: SHIPPED | OUTPATIENT
Start: 2017-03-09 | End: 2017-04-18

## 2017-03-09 RX ORDER — BUDESONIDE 0.5 MG/2ML
500 INHALANT ORAL 2 TIMES DAILY
Qty: 60 EACH | Refills: 1 | Status: SHIPPED | OUTPATIENT
Start: 2017-03-09 | End: 2018-08-23

## 2017-03-09 RX ADMIN — OXYCODONE HYDROCHLORIDE AND ACETAMINOPHEN 2 TABLET: 5; 325 TABLET ORAL at 03:12

## 2017-03-09 RX ADMIN — FLUOXETINE 60 MG: 20 CAPSULE ORAL at 08:28

## 2017-03-09 RX ADMIN — BUMETANIDE 0.5 MG: 0.5 TABLET ORAL at 08:28

## 2017-03-09 RX ADMIN — METHYLPREDNISOLONE SODIUM SUCCINATE 60 MG: 125 INJECTION, POWDER, FOR SOLUTION INTRAMUSCULAR; INTRAVENOUS at 06:38

## 2017-03-09 RX ADMIN — ALPRAZOLAM 1 MG: 1 TABLET ORAL at 08:28

## 2017-03-09 RX ADMIN — HEPARIN SODIUM 5000 UNITS: 5000 INJECTION INTRAVENOUS; SUBCUTANEOUS at 08:29

## 2017-03-09 RX ADMIN — ARFORMOTEROL TARTRATE 15 MCG: 15 SOLUTION RESPIRATORY (INHALATION) at 09:53

## 2017-03-09 RX ADMIN — FAMOTIDINE 20 MG: 20 TABLET ORAL at 08:28

## 2017-03-09 RX ADMIN — HYDRALAZINE HYDROCHLORIDE 50 MG: 50 TABLET, FILM COATED ORAL at 08:28

## 2017-03-09 RX ADMIN — OXYCODONE HYDROCHLORIDE AND ACETAMINOPHEN 2 TABLET: 5; 325 TABLET ORAL at 09:25

## 2017-03-09 RX ADMIN — AMLODIPINE BESYLATE 10 MG: 10 TABLET ORAL at 08:28

## 2017-03-09 RX ADMIN — BUDESONIDE 500 MCG: 0.5 INHALANT RESPIRATORY (INHALATION) at 09:53

## 2017-03-09 RX ADMIN — ALBUTEROL SULFATE 2.5 MG: 2.5 SOLUTION RESPIRATORY (INHALATION) at 09:27

## 2017-03-09 NOTE — DISCHARGE INSTRUCTIONS
DISCHARGE SUMMARY from Nurse    The following personal items are in your possession at time of discharge:    Dental Appliances: None  Visual Aid: Glasses     Home Medications: None  Jewelry: None  Clothing: None  Other Valuables: None  Personal Items Sent to Safe: n/a          PATIENT INSTRUCTIONS:    After general anesthesia or intravenous sedation, for 24 hours or while taking prescription Narcotics:  · Limit your activities  · Do not drive and operate hazardous machinery  · Do not make important personal or business decisions  · Do  not drink alcoholic beverages  · If you have not urinated within 8 hours after discharge, please contact your surgeon on call. What to do at Home:  Recommended activity: Activity as tolerated      *  Please give a list of your current medications to your Primary Care Provider. *  Please update this list whenever your medications are discontinued, doses are      changed, or new medications (including over-the-counter products) are added. *  Please carry medication information at all times in case of emergency situations. These are general instructions for a healthy lifestyle:    No smoking/ No tobacco products/ Avoid exposure to second hand smoke    Surgeon General's Warning:  Quitting smoking now greatly reduces serious risk to your health. Obesity, smoking, and sedentary lifestyle greatly increases your risk for illness    A healthy diet, regular physical exercise & weight monitoring are important for maintaining a healthy lifestyle    You may be retaining fluid if you have a history of heart failure or if you experience any of the following symptoms:  Weight gain of 3 pounds or more overnight or 5 pounds in a week, increased swelling in our hands or feet or shortness of breath while lying flat in bed. Please call your doctor as soon as you notice any of these symptoms; do not wait until your next office visit.     Recognize signs and symptoms of STROKE:    F-face looks uneven    A-arms unable to move or move unevenly    S-speech slurred or non-existent    T-time-call 911 as soon as signs and symptoms begin-DO NOT go       Back to bed or wait to see if you get better-TIME IS BRAIN. Warning Signs of HEART ATTACK     Call 911 if you have these symptoms:   Chest discomfort. Most heart attacks involve discomfort in the center of the chest that lasts more than a few minutes, or that goes away and comes back. It can feel like uncomfortable pressure, squeezing, fullness, or pain.  Discomfort in other areas of the upper body. Symptoms can include pain or discomfort in one or both arms, the back, neck, jaw, or stomach.  Shortness of breath with or without chest discomfort.  Other signs may include breaking out in a cold sweat, nausea, or lightheadedness. Don't wait more than five minutes to call 911 - MINUTES MATTER! Fast action can save your life. Calling 911 is almost always the fastest way to get lifesaving treatment. Emergency Medical Services staff can begin treatment when they arrive -- up to an hour sooner than if someone gets to the hospital by car. CallMD Activation    Thank you for requesting access to CallMD. Please follow the instructions below to securely access and download your online medical record. CallMD allows you to send messages to your doctor, view your test results, renew your prescriptions, schedule appointments, and more. How Do I Sign Up? 1. In your internet browser, go to [email or web address]  2. Click on the First Time User? Click Here link in the Sign In box. You will be redirect to the New Member Sign Up page. 3. Enter your CallMD Access Code exactly as it appears below. You will not need to use this code after youve completed the sign-up process. If you do not sign up before the expiration date, you must request a new code.     CallMD Access Code: [email or web address]  Expires: 5/27/2017  3:13 AM (This is the date your [email or web address] access code will )    4. Enter the last four digits of your Social Security Number (xxxx) and Date of Birth (mm/dd/yyyy) as indicated and click Submit. You will be taken to the next sign-up page. 5. Create a Audiencet ID. This will be your [email or web address] login ID and cannot be changed, so think of one that is secure and easy to remember. 6. Create a [email or web address] password. You can change your password at any time. 7. Enter your Password Reset Question and Answer. This can be used at a later time if you forget your password. 8. Enter your e-mail address. You will receive e-mail notification when new information is available in [address] 9. Click Sign Up. You can now view and download portions of your medical record. 10. Click the Download Summary menu link to download a portable copy of your medical information. Additional Information    If you have questions, please visit the Frequently Asked Questions section of the [email or web address] website at [email or web address] Remember, [email or web address] is NOT to be used for urgent needs. For medical emergencies, dial 911. The discharge information has been reviewed with the patient. The patient verbalized understanding. Discharge medications reviewed with the patient and appropriate educational materials and side effects teaching were provided. Patient armband removed and shredded          Controlling Your Asthma: Care Instructions  Your Care Instructions    Asthma is a long-term condition that affects your breathing. It causes the airways that lead to the lungs to swell. During an asthma attack, the airways swell and narrow. This makes it hard to breathe. You may wheeze or cough. If you have a bad attack, you may need emergency care. There are two things to do to treat asthma. · Control asthma over the long term. · Treat attacks when they occur. You and your doctor can make an asthma action plan.  It tells you what medicines you need to take every day to control asthma symptoms and what to do if you have an asthma attack. Your asthma action plan can help prevent and treat attacks. When you keep your asthma under control, you can prevent severe attacks and lasting damage to your airways. You need to treat your asthma even when you are not having symptoms. Although asthma is a lifelong problem, you can still lead a full and active life. Follow-up care is a key part of your treatment and safety. Be sure to make and go to all appointments, and call your doctor if you are having problems. It's also a good idea to know your test results and keep a list of the medicines you take. How can you care for yourself at home? To control asthma over the long term  Medicines  Controller medicines reduce swelling in your lungs. They also prevent asthma attacks. Take your controller medicine exactly as prescribed. Talk to your doctor if you have any problems with your medicine. · Inhaled corticosteroid is a common and effective controller medicine. Using it the right way can prevent or reduce most side effects. · Take your controller medicine every day, not just when you have symptoms. It helps prevent problems before they occur. · Your doctor may prescribe another medicine that you use along with the corticosteroid. This is often a long-acting bronchodilator. Do not take this medicine by itself. Using a long-acting bronchodilator by itself can increase your risk of a severe or fatal asthma attack. · Do not take inhaled corticosteroids or long-acting bronchodilators to stop an asthma attack that has already started. They don't work fast enough to help. · Talk to your doctor before you use other medicines. Some medicines, such as aspirin, can cause asthma attacks in some people. Education  · Learn what triggers an asthma attack. Avoid these triggers when you can.  Common triggers include colds, smoke, air pollution, dust, pollen, mold, pets, cockroaches, stress, and cold air. · Check yourself for asthma symptoms to know which step to follow in your action plan. Watch for things like being short of breath, having chest tightness, coughing, and wheezing. Also notice if symptoms wake you up at night or if you get tired quickly when you exercise. · If you have a peak flow meter, use it to check how well you are breathing. It can help you know when an asthma attack is going to occur. Then you can take medicine to prevent the asthma attack or make it less severe. · Do not smoke or allow others to smoke around you. Avoid smoky places. Smoking makes asthma worse. If you need help quitting, talk to your doctor about stop-smoking programs and medicines. These can increase your chances of quitting for good. · Avoid colds and the flu. Get a pneumococcal vaccine shot. If you have had one before, ask your doctor whether you need a second dose. Get a flu vaccine every year, as soon as it's available. If you must be around people with colds or the flu, wash your hands often. To treat attacks when they occur  Use your asthma action plan when you have an attack. Your quick-relief medicine will stop an asthma attack. It relaxes the muscles that get tight around the airways. If your doctor prescribed corticosteroid pills to use during an attack, take them as directed. They may take hours to work, but they may shorten the attack and help you breathe better. · Albuterol is an effective quick-relief inhaler. · Take your quick-relief medicine exactly as prescribed. · Always bring your asthma medicine with you when you travel. · You may need to use quick-relief medicine before you exercise. · Call your doctor if you think you are having a problem with your medicine. When should you call for help? Call 911 anytime you think you may need emergency care. For example, call if:  · You are having severe trouble breathing.   Call your doctor now or seek immediate medical care if:  · Your symptoms do not get better after you have followed your asthma action plan. · You cough up yellow, dark brown, or bloody mucus (sputum). Watch closely for changes in your health, and be sure to contact your doctor if:  · Your coughing and wheezing get worse. · You need to use your quick-relief medicine on more than 2 days a week (unless it is just for exercise). · You need help figuring out what is triggering your asthma attacks. Where can you learn more? Go to http://magda-kadie.info/. Enter W397 in the search box to learn more about \"Controlling Your Asthma: Care Instructions. \"  Current as of: May 23, 2016  Content Version: 11.1  © 8514-2562 [employer]. Care instructions adapted under license by [employer] (which disclaims liability or warranty for this information). If you have questions about a medical condition or this instruction, always ask your healthcare professional. [place] [address] any warranty or liability for your use of this information.              Learning About Chronic Bronchitis  What is chronic bronchitis? Chronic bronchitis is long-term swelling and the buildup of mucus in the airways of your lungs. The airways (bronchial tubes) get inflamed and make a lot of mucus. This can narrow or block the airways, making it hard for you to breathe. It is a form of COPD (chronic obstructive pulmonary disease). Chronic bronchitis is usually caused by smoking. But chemical fumes, dust, or air pollution also can cause it over time. What can you expect when you have chronic bronchitis? Chronic bronchitis gets worse over time. You cannot undo the damage to your lungs. Over time, you may find that:  · You get short of breath even when you do simple things like get dressed or fix a meal.  · It is hard to eat or exercise. · You lose weight and feel weaker.   Over many years, the swelling and mucus from chronic bronchitis make it more likely that you will get lung infections. But there are things you can do to prevent more damage and feel better. What are the symptoms? The main symptoms of chronic bronchitis are:  · A cough that will not go away. · Mucus that comes up when you cough. · Shortness of breath that gets worse when you exercise. At times, your symptoms may suddenly flare up and get much worse. This is a called an exacerbation (say \"egg-ZASS-er-BAY-shun\"). When this happens, your usual symptoms quickly get worse and stay bad. This can be dangerous. You may have to go to the hospital.  How can you keep chronic bronchitis from getting worse? Don't smoke. That is the best way to keep chronic bronchitis from getting worse. If you already smoke, it is never too late to stop. If you need help quitting, talk to your doctor about stop-smoking programs and medicines. These can increase your chances of quitting for good. You can do other things to keep chronic bronchitis from getting worse:  · Avoid bad air. Air pollution, chemical fumes, and dust also can make chronic bronchitis worse. · Get a flu shot every year. A shot may keep the flu from turning into something more serious, like pneumonia. A flu shot also may lower your chances of having a flare-up. · Get a pneumococcal shot. A shot can prevent some of the serious complications of pneumonia. Ask your doctor how often you should get this shot. How is chronic bronchitis treated? Chronic bronchitis is treated with medicines and oxygen. You also can take steps at home to stay healthy and keep your condition from getting worse. Medicines and oxygen therapy  · You may be taking medicines such as:  ¨ Bronchodilators. These help open your airways and make breathing easier. Bronchodilators are either short-acting (work for 6 to 9 hours) or long-acting (work for 24 hours). You inhale most bronchodilators, so they start to act quickly.  Always carry your quick-relief inhaler with you in case you need it while you are away from home. ¨ Corticosteroids. These reduce airway inflammation. They come in pill or inhaled form. You must take these medicines every day for them to work well. ¨ Antibiotics. These medicines are used when you have a bacterial lung infection. · Take your medicines exactly as prescribed. Call your doctor if you think you are having a problem with your medicine. · Oxygen therapy boosts the amount of oxygen in your blood and helps you breathe easier. Use the flow rate your doctor has recommended, and do not change it without talking to your doctor first.  Other care at home  · If your doctor recommends it, get more exercise. Walking is a good choice. Bit by bit, increase the amount you walk every day. Try for at least 30 minutes on most days of the week. · Learn breathing methods--such as breathing through pursed lips--to help you become less short of breath. · If your doctor has not set you up with a pulmonary rehabilitation program, talk to him or her about whether rehab is right for you. Rehab includes exercise programs, education about your disease and how to manage it, help with diet and other changes, and emotional support. · Eat regular, healthy meals. Use bronchodilators about 1 hour before you eat to make it easier to eat. Eat several small meals instead of three large ones. Drink beverages at the end of the meal. Avoid foods that are hard to chew. Follow-up care is a key part of your treatment and safety. Be sure to make and go to all appointments, and call your doctor if you are having problems. It's also a good idea to know your test results and keep a list of the medicines you take. Where can you learn more? Go to http://magda-kadie.info/. Enter F793 in the search box to learn more about \"Learning About Chronic Bronchitis. \"  Current as of: May 23, 2016  Content Version: 11.1  © 9364-0787 [employer].  Care instructions adapted under license by [employer] (which disclaims liability or warranty for this information). If you have questions about a medical condition or this instruction, always ask your healthcare professional. [address] any warranty or liability for your use of this information.

## 2017-03-09 NOTE — PROGRESS NOTES
Pt insisted Ashley Estrada was discharging her. Pt stated she was \"ready to go now\" . She said her ride [her sister] wasn't going to wait for her, and she needed to go now. No order found in system and Dr. Adrianne Churchill contacted. Dr. Adrianne Churchill placed discharge orders. Pt declined to fully review discharge instruction or medications. Pt stated \"I read it later. It's nothing new\". Pt was informed of scheduled follow-up appointment and provided her prescriptions. Pt signed her AVS to discharge. Since the patient was on oxygen [place], pt was asked if she was on oxygen at home. Pt stated she was on oxygen at home. When asked if she had a oxygen tank with her to hook up to, pt replied \"no\". Pt stated she would be going home with the hospital's oxygen tank on the wheelchair. Pt was informed it was hospital property and this was not a possibility. Pt insisted \"I always go home with it\" and \"you don't know what your talking about\". I explained to the patient that a doctor's order was required to get a oxygen tank to go home with. Pt insisted she \"never had this problem before\". I offered to speak with the  Camila Judge about confirming her assumption about the hospital oxygen tank, or getting her a oxygen tank to go home with her. Pt said \"let's do that while we're heading out\" . Pt transported by wheelchair to Ruby's office at the . Camila Judge confirmed that she couldn't take the hospital tank home. Camila Judge insisted she could get an doctor's precription for the oxygen tank and get her a tank for transport home, but it would take time. Pt encourged to wait for us to get her a tank and it would be safer. But, pt insisted \"my sister isn't patience\" and the patient wasn't going to wait. Patient again insisted she should be able to take the hospital's oxygen tank home.  Clinical Coordinator Marilin Naik also reaffirmed to the patient that she couldn't take the hospital tank home with her and patient needed to get a dr order to get her a tank . ROSALINE Camara encouraged the patient to wait for a oxygen tank, but again patient declined to wait. Pt transported by wheelchair to main entrance and assisted into her sister's van.

## 2017-03-09 NOTE — ROUTINE PROCESS
Bedside and Verbal shift change report given to Bob Pool RN (oncoming nurse) by Alessio Sung RN (offgoing nurse). Report included the following information SBAR, Kardex, MAR and Recent Results.     SITUATION:    Code Status: Full Code   Reason for Admission: COPD exacerbation ([facility] [address]    [facility] day: 6   Problem List:       Hospital Problems  Date Reviewed: 3/3/2017          Codes Class Noted POA    COPD exacerbation ([facility] [address] ICD-10-CM: J44.1  ICD-9-CM: 491.21  3/12/2016 Unknown              BACKGROUND:    Past Medical History:   Past Medical History:   Diagnosis Date    Asthma     Chronic obstructive pulmonary disease ([facility] [address]     Endocrine disease     thyroid issues    Gastrointestinal disorder     \"blockage in my stomach\"    Hypertension          Patient taking anticoagulants yes     ASSESSMENT:    Changes in Assessment Throughout Shift: none     Patient has Central Line: no Reasons if yes:    Patient has Walker Cath: no Reasons if yes:       Last Vitals:     Vitals:    03/08/17 1717 03/08/17 1924 03/08/17 2113 03/09/17 0317   BP: 143/72 145/74  145/78   Pulse: 100 91  82   Resp: 16 16  14   Temp: 96.9 °F (36.1 °C) 96.6 °F (35.9 °C)  96.7 °F (35.9 °C)   SpO2: 97% 92% 99% 93%   Weight: 119.7 kg (263 lb 12.8 oz)           IV and DRAINS (will only show if present)   [REMOVED] Peripheral IV 03/03/17 Left Hand-Site Assessment: Clean, dry, & intact  Peripheral IV 03/03/17 Right Antecubital-Site Assessment: Clean, dry, & intact     WOUND (if present)   Wound Type:  none   Dressing present Dressing Present : No   Wound Concerns/Notes:  none     PAIN    Pain Assessment    Pain Intensity 1: 0 (03/09/17 0400)    Pain Location 1: Rib cage    Pain Intervention(s) 1: Medication (see MAR)    Patient Stated Pain Goal: 2  o Interventions for Pain:  See Mar  o Intervention effective: yes  o Time of last intervention: see Mar   o Reassessment Completed: yes      Last 3 Weights:  Last 3 Recorded Weights in this Encounter 03/08/17 1717   Weight: 119.7 kg (263 lb 12.8 oz)     Weight change:      INTAKE/OUPUT    Current Shift: 03/08 1901 - 03/09 0700  In: 480 [P.O.:480]  Out: -     Last three shifts: 03/07 0701 - 03/08 1900  In: 1220 [P.O.:1220]  Out: 1050 [Urine:1050]     LAB RESULTS     Recent Labs      03/09/17   0305  03/07/17   0510   WBC  13.7*  12.4   HGB  14.1  13.7   HCT  43.4  42.1   PLT  271  247        Recent Labs      03/09/17   0305  03/07/17   0241   NA  137  139   K  3.9  4.6   GLU  138*  151*   BUN  26*  22*   CREA  0.86  1.01   CA  7.9*  8.1*       RECOMMENDATIONS AND DISCHARGE PLANNING     1. Pending tests/procedures/ Plan of Care or Other Needs: none     2. Discharge plan for patient and Needs/Barriers: home    3. Estimated Discharge Date: 2 days Posted on Whiteboard in [facility]: yes      4. The patient's care plan was reviewed with the oncoming nurse. \"HEALS\" SAFETY CHECK      Fall Risk    Total Score: 1    Safety Measures: Safety Measures: Bed/Chair-Wheels locked, Bed in low position, Call light within reach, Side rails X2    A safety check occurred in the patient's room between off going nurse and oncoming nurse listed above. The safety check included the below items  Area Items   H  High Alert Medications - Verify all high alert medication drips (heparin, PCA, etc.)   E  Equipment - Suction is set up for ALL patients (with yanker)  - Red plugs utilized for all equipment (IV pumps, etc.)  - WOWs wiped down at end of shift.  - Room stocked with oxygen, suction, and other unit-specific supplies   A  Alarms - Bed alarm is set for fall risk patients  - Ensure chair alarm is in place and activated if patient is up in a chair   L  Lines - Check IV for any infiltration  - Walker bag is empty if patient has a Walker   - Tubing and IV bags are labeled   S  Safety   - Room is clean, patient is clean, and equipment is clean. - Hallways are clear from equipment besides carts.    - Fall bracelet on for fall risk patients  - Ensure room is clear and free of clutter  - Suction is set up for ALL patients (with rios)  - Hallways are clear from equipment besides carts.    - Isolation precautions followed, supplies available outside room, sign posted     Sari Cruz RN

## 2017-03-10 ENCOUNTER — HOSPITAL ENCOUNTER (INPATIENT)
Age: 52
LOS: 2 days | Discharge: HOME | DRG: 140 | End: 2017-03-12
Attending: EMERGENCY MEDICINE | Admitting: INTERNAL MEDICINE
Payer: MEDICAID

## 2017-03-10 ENCOUNTER — APPOINTMENT (OUTPATIENT)
Dept: GENERAL RADIOLOGY | Age: 52
DRG: 140 | End: 2017-03-10
Attending: EMERGENCY MEDICINE
Payer: MEDICAID

## 2017-03-10 DIAGNOSIS — J44.1 COPD WITH ACUTE EXACERBATION (HCC): Primary | ICD-10-CM

## 2017-03-10 PROBLEM — E66.01 MORBID OBESITY DUE TO EXCESS CALORIES (HCC): Status: ACTIVE | Noted: 2017-03-10

## 2017-03-10 PROBLEM — I10 ESSENTIAL HYPERTENSION: Status: ACTIVE | Noted: 2017-03-10

## 2017-03-10 PROBLEM — R07.1 CHEST PAIN ON BREATHING: Status: ACTIVE | Noted: 2017-03-10

## 2017-03-10 LAB
ALBUMIN SERPL BCP-MCNC: 3.2 G/DL (ref 3.4–5)
ALBUMIN/GLOB SERPL: 1 {RATIO} (ref 0.8–1.7)
ALP SERPL-CCNC: 91 U/L (ref 45–117)
ALT SERPL-CCNC: 50 U/L (ref 13–56)
ANION GAP BLD CALC-SCNC: 9 MMOL/L (ref 3–18)
ARTERIAL PATENCY WRIST A: ABNORMAL
AST SERPL W P-5'-P-CCNC: 17 U/L (ref 15–37)
BASE DEFICIT BLD-SCNC: 1 MMOL/L
BASOPHILS # BLD AUTO: 0 K/UL (ref 0–0.06)
BASOPHILS # BLD: 0 % (ref 0–3)
BDY SITE: ABNORMAL
BILIRUB SERPL-MCNC: 0.3 MG/DL (ref 0.2–1)
BUN SERPL-MCNC: 34 MG/DL (ref 7–18)
BUN/CREAT SERPL: 31 (ref 12–20)
CALCIUM SERPL-MCNC: 7.9 MG/DL (ref 8.5–10.1)
CHLORIDE SERPL-SCNC: 98 MMOL/L (ref 100–108)
CK MB CFR SERPL CALC: 2.2 % (ref 0–4)
CK MB SERPL-MCNC: 2.1 NG/ML (ref 5–25)
CK SERPL-CCNC: 95 U/L (ref 26–192)
CO2 SERPL-SCNC: 28 MMOL/L (ref 21–32)
CREAT SERPL-MCNC: 1.1 MG/DL (ref 0.6–1.3)
DIFFERENTIAL METHOD BLD: ABNORMAL
EOSINOPHIL # BLD: 0 K/UL (ref 0–0.4)
EOSINOPHIL NFR BLD: 0 % (ref 0–5)
ERYTHROCYTE [DISTWIDTH] IN BLOOD BY AUTOMATED COUNT: 13.4 % (ref 11.6–14.5)
EST. AVERAGE GLUCOSE BLD GHB EST-MCNC: 108 MG/DL
GAS FLOW.O2 O2 DELIVERY SYS: ABNORMAL L/MIN
GAS FLOW.O2 SETTING OXYMISER: 2 L/M
GLOBULIN SER CALC-MCNC: 3.3 G/DL (ref 2–4)
GLUCOSE BLD STRIP.AUTO-MCNC: 131 MG/DL (ref 70–110)
GLUCOSE BLD STRIP.AUTO-MCNC: 207 MG/DL (ref 70–110)
GLUCOSE BLD STRIP.AUTO-MCNC: 97 MG/DL (ref 70–110)
GLUCOSE SERPL-MCNC: 149 MG/DL (ref 74–99)
HBA1C MFR BLD: 5.4 % (ref 4.2–5.6)
HCO3 BLD-SCNC: 26.4 MMOL/L (ref 22–26)
HCT VFR BLD AUTO: 46 % (ref 35–45)
HGB BLD-MCNC: 15.1 G/DL (ref 12–16)
LYMPHOCYTES # BLD AUTO: 9 % (ref 20–51)
LYMPHOCYTES # BLD: 1.9 K/UL (ref 0.8–3.5)
MAGNESIUM SERPL-MCNC: 2.7 MG/DL (ref 1.8–2.4)
MCH RBC QN AUTO: 28.9 PG (ref 24–34)
MCHC RBC AUTO-ENTMCNC: 32.8 G/DL (ref 31–37)
MCV RBC AUTO: 88 FL (ref 74–97)
MONOCYTES # BLD: 1.1 K/UL (ref 0–1)
MONOCYTES NFR BLD AUTO: 5 % (ref 2–9)
NEUTS SEG # BLD: 18.3 K/UL (ref 1.8–8)
NEUTS SEG NFR BLD AUTO: 86 % (ref 42–75)
O2/TOTAL GAS SETTING VFR VENT: 28 %
PCO2 BLD: 51.8 MMHG (ref 35–45)
PH BLD: 7.32 [PH] (ref 7.35–7.45)
PLATELET # BLD AUTO: 273 K/UL (ref 135–420)
PLATELET COMMENTS,PCOM: ABNORMAL
PMV BLD AUTO: 10.6 FL (ref 9.2–11.8)
PO2 BLD: 85 MMHG (ref 80–100)
POTASSIUM SERPL-SCNC: 3.8 MMOL/L (ref 3.5–5.5)
PROT SERPL-MCNC: 6.5 G/DL (ref 6.4–8.2)
RBC # BLD AUTO: 5.23 M/UL (ref 4.2–5.3)
RBC MORPH BLD: ABNORMAL
SAO2 % BLD: 95 % (ref 92–97)
SERVICE CMNT-IMP: ABNORMAL
SODIUM SERPL-SCNC: 135 MMOL/L (ref 136–145)
SPECIMEN TYPE: ABNORMAL
TOTAL RESP. RATE, ITRR: 16
TROPONIN I SERPL-MCNC: 0.03 NG/ML (ref 0–0.04)
WBC # BLD AUTO: 21.3 K/UL (ref 4.6–13.2)

## 2017-03-10 PROCEDURE — 74011250636 HC RX REV CODE- 250/636: Performed by: EMERGENCY MEDICINE

## 2017-03-10 PROCEDURE — 82550 ASSAY OF CK (CPK): CPT | Performed by: EMERGENCY MEDICINE

## 2017-03-10 PROCEDURE — 94640 AIRWAY INHALATION TREATMENT: CPT

## 2017-03-10 PROCEDURE — 77030013140 HC MSK NEB VYRM -A

## 2017-03-10 PROCEDURE — 71010 XR CHEST PORT: CPT

## 2017-03-10 PROCEDURE — 85025 COMPLETE CBC W/AUTO DIFF WBC: CPT | Performed by: EMERGENCY MEDICINE

## 2017-03-10 PROCEDURE — 82803 BLOOD GASES ANY COMBINATION: CPT

## 2017-03-10 PROCEDURE — 74011000250 HC RX REV CODE- 250: Performed by: EMERGENCY MEDICINE

## 2017-03-10 PROCEDURE — 65270000029 HC RM PRIVATE

## 2017-03-10 PROCEDURE — 96365 THER/PROPH/DIAG IV INF INIT: CPT

## 2017-03-10 PROCEDURE — 82962 GLUCOSE BLOOD TEST: CPT

## 2017-03-10 PROCEDURE — 74011250637 HC RX REV CODE- 250/637: Performed by: INTERNAL MEDICINE

## 2017-03-10 PROCEDURE — 74011250636 HC RX REV CODE- 250/636: Performed by: HOSPITALIST

## 2017-03-10 PROCEDURE — 74011000250 HC RX REV CODE- 250: Performed by: INTERNAL MEDICINE

## 2017-03-10 PROCEDURE — 96375 TX/PRO/DX INJ NEW DRUG ADDON: CPT

## 2017-03-10 PROCEDURE — 93005 ELECTROCARDIOGRAM TRACING: CPT

## 2017-03-10 PROCEDURE — 74011250637 HC RX REV CODE- 250/637: Performed by: HOSPITALIST

## 2017-03-10 PROCEDURE — 36600 WITHDRAWAL OF ARTERIAL BLOOD: CPT

## 2017-03-10 PROCEDURE — 99284 EMERGENCY DEPT VISIT MOD MDM: CPT

## 2017-03-10 PROCEDURE — 83036 HEMOGLOBIN GLYCOSYLATED A1C: CPT | Performed by: HOSPITALIST

## 2017-03-10 PROCEDURE — 83735 ASSAY OF MAGNESIUM: CPT | Performed by: EMERGENCY MEDICINE

## 2017-03-10 PROCEDURE — 74011250636 HC RX REV CODE- 250/636: Performed by: INTERNAL MEDICINE

## 2017-03-10 PROCEDURE — 80053 COMPREHEN METABOLIC PANEL: CPT | Performed by: EMERGENCY MEDICINE

## 2017-03-10 RX ORDER — INSULIN LISPRO 100 [IU]/ML
INJECTION, SOLUTION INTRAVENOUS; SUBCUTANEOUS
Status: DISCONTINUED | OUTPATIENT
Start: 2017-03-10 | End: 2017-03-12 | Stop reason: HOSPADM

## 2017-03-10 RX ORDER — IPRATROPIUM BROMIDE AND ALBUTEROL SULFATE 2.5; .5 MG/3ML; MG/3ML
3 SOLUTION RESPIRATORY (INHALATION) ONCE
Status: COMPLETED | OUTPATIENT
Start: 2017-03-10 | End: 2017-03-10

## 2017-03-10 RX ORDER — SODIUM CHLORIDE 0.9 % (FLUSH) 0.9 %
5-10 SYRINGE (ML) INJECTION EVERY 8 HOURS
Status: DISCONTINUED | OUTPATIENT
Start: 2017-03-10 | End: 2017-03-12 | Stop reason: HOSPADM

## 2017-03-10 RX ORDER — MAGNESIUM SULFATE 100 %
4 CRYSTALS MISCELLANEOUS AS NEEDED
Status: DISCONTINUED | OUTPATIENT
Start: 2017-03-10 | End: 2017-03-12 | Stop reason: HOSPADM

## 2017-03-10 RX ORDER — ARFORMOTEROL TARTRATE 15 UG/2ML
15 SOLUTION RESPIRATORY (INHALATION) 2 TIMES DAILY
Status: DISCONTINUED | OUTPATIENT
Start: 2017-03-10 | End: 2017-03-11

## 2017-03-10 RX ORDER — FAMOTIDINE 20 MG/1
20 TABLET, FILM COATED ORAL DAILY
Status: DISCONTINUED | OUTPATIENT
Start: 2017-03-10 | End: 2017-03-12 | Stop reason: HOSPADM

## 2017-03-10 RX ORDER — DOXYCYCLINE 100 MG/1
100 CAPSULE ORAL EVERY 12 HOURS
Status: DISCONTINUED | OUTPATIENT
Start: 2017-03-10 | End: 2017-03-10

## 2017-03-10 RX ORDER — BUMETANIDE 0.5 MG/1
0.5 TABLET ORAL
Status: DISCONTINUED | OUTPATIENT
Start: 2017-03-10 | End: 2017-03-12 | Stop reason: HOSPADM

## 2017-03-10 RX ORDER — MAGNESIUM SULFATE HEPTAHYDRATE 40 MG/ML
2 INJECTION, SOLUTION INTRAVENOUS ONCE
Status: COMPLETED | OUTPATIENT
Start: 2017-03-10 | End: 2017-03-10

## 2017-03-10 RX ORDER — BUDESONIDE AND FORMOTEROL FUMARATE DIHYDRATE 160; 4.5 UG/1; UG/1
2 AEROSOL RESPIRATORY (INHALATION) 2 TIMES DAILY
Status: DISCONTINUED | OUTPATIENT
Start: 2017-03-10 | End: 2017-03-10

## 2017-03-10 RX ORDER — FLUOXETINE HYDROCHLORIDE 20 MG/1
60 CAPSULE ORAL DAILY
Status: DISCONTINUED | OUTPATIENT
Start: 2017-03-10 | End: 2017-03-12 | Stop reason: HOSPADM

## 2017-03-10 RX ORDER — ALBUTEROL SULFATE 0.83 MG/ML
2.5 SOLUTION RESPIRATORY (INHALATION)
Status: DISCONTINUED | OUTPATIENT
Start: 2017-03-11 | End: 2017-03-12 | Stop reason: HOSPADM

## 2017-03-10 RX ORDER — BUDESONIDE 0.5 MG/2ML
500 INHALANT ORAL
Status: DISCONTINUED | OUTPATIENT
Start: 2017-03-10 | End: 2017-03-12 | Stop reason: HOSPADM

## 2017-03-10 RX ORDER — HYDRALAZINE HYDROCHLORIDE 50 MG/1
50 TABLET, FILM COATED ORAL 3 TIMES DAILY
Status: DISCONTINUED | OUTPATIENT
Start: 2017-03-10 | End: 2017-03-12 | Stop reason: HOSPADM

## 2017-03-10 RX ORDER — MORPHINE SULFATE 4 MG/ML
4 INJECTION, SOLUTION INTRAMUSCULAR; INTRAVENOUS
Status: COMPLETED | OUTPATIENT
Start: 2017-03-10 | End: 2017-03-10

## 2017-03-10 RX ORDER — BUDESONIDE 0.5 MG/2ML
500 INHALANT ORAL 2 TIMES DAILY
Status: DISCONTINUED | OUTPATIENT
Start: 2017-03-10 | End: 2017-03-10

## 2017-03-10 RX ORDER — MAGNESIUM SULFATE HEPTAHYDRATE 500 MG/ML
2 INJECTION, SOLUTION INTRAMUSCULAR; INTRAVENOUS
Status: DISCONTINUED | OUTPATIENT
Start: 2017-03-10 | End: 2017-03-10 | Stop reason: CLARIF

## 2017-03-10 RX ORDER — ONDANSETRON 2 MG/ML
4 INJECTION INTRAMUSCULAR; INTRAVENOUS
Status: COMPLETED | OUTPATIENT
Start: 2017-03-10 | End: 2017-03-10

## 2017-03-10 RX ORDER — DEXTROSE 50 % IN WATER (D50W) INTRAVENOUS SYRINGE
25-50 AS NEEDED
Status: DISCONTINUED | OUTPATIENT
Start: 2017-03-10 | End: 2017-03-12 | Stop reason: HOSPADM

## 2017-03-10 RX ORDER — HEPARIN SODIUM 5000 [USP'U]/ML
5000 INJECTION, SOLUTION INTRAVENOUS; SUBCUTANEOUS EVERY 8 HOURS
Status: DISCONTINUED | OUTPATIENT
Start: 2017-03-10 | End: 2017-03-12 | Stop reason: HOSPADM

## 2017-03-10 RX ORDER — SODIUM CHLORIDE 0.9 % (FLUSH) 0.9 %
5-10 SYRINGE (ML) INJECTION AS NEEDED
Status: DISCONTINUED | OUTPATIENT
Start: 2017-03-10 | End: 2017-03-12 | Stop reason: HOSPADM

## 2017-03-10 RX ORDER — MONTELUKAST SODIUM 10 MG/1
10 TABLET ORAL
Status: DISCONTINUED | OUTPATIENT
Start: 2017-03-10 | End: 2017-03-12 | Stop reason: HOSPADM

## 2017-03-10 RX ORDER — ALBUTEROL SULFATE 0.83 MG/ML
2.5 SOLUTION RESPIRATORY (INHALATION)
Status: DISCONTINUED | OUTPATIENT
Start: 2017-03-10 | End: 2017-03-10

## 2017-03-10 RX ORDER — ALPRAZOLAM 1 MG/1
1 TABLET ORAL 2 TIMES DAILY
Status: DISCONTINUED | OUTPATIENT
Start: 2017-03-10 | End: 2017-03-12 | Stop reason: HOSPADM

## 2017-03-10 RX ORDER — AMLODIPINE BESYLATE 10 MG/1
10 TABLET ORAL DAILY
Status: DISCONTINUED | OUTPATIENT
Start: 2017-03-10 | End: 2017-03-12 | Stop reason: HOSPADM

## 2017-03-10 RX ORDER — OXYCODONE AND ACETAMINOPHEN 5; 325 MG/1; MG/1
1 TABLET ORAL
Status: DISCONTINUED | OUTPATIENT
Start: 2017-03-10 | End: 2017-03-11

## 2017-03-10 RX ORDER — ACETAMINOPHEN 325 MG/1
650 TABLET ORAL
Status: DISCONTINUED | OUTPATIENT
Start: 2017-03-10 | End: 2017-03-12 | Stop reason: HOSPADM

## 2017-03-10 RX ORDER — LEVOFLOXACIN 5 MG/ML
750 INJECTION, SOLUTION INTRAVENOUS EVERY 24 HOURS
Status: DISCONTINUED | OUTPATIENT
Start: 2017-03-10 | End: 2017-03-12

## 2017-03-10 RX ADMIN — BUMETANIDE 0.5 MG: 0.5 TABLET ORAL at 11:01

## 2017-03-10 RX ADMIN — HEPARIN SODIUM 5000 UNITS: 5000 INJECTION, SOLUTION INTRAVENOUS; SUBCUTANEOUS at 10:39

## 2017-03-10 RX ADMIN — HYDRALAZINE HYDROCHLORIDE 50 MG: 50 TABLET, FILM COATED ORAL at 10:30

## 2017-03-10 RX ADMIN — METHYLPREDNISOLONE SODIUM SUCCINATE 40 MG: 40 INJECTION, POWDER, FOR SOLUTION INTRAMUSCULAR; INTRAVENOUS at 23:03

## 2017-03-10 RX ADMIN — LEVOFLOXACIN 750 MG: 5 INJECTION INTRAVENOUS at 14:19

## 2017-03-10 RX ADMIN — MONTELUKAST SODIUM 10 MG: 10 TABLET, FILM COATED ORAL at 23:04

## 2017-03-10 RX ADMIN — ALBUTEROL SULFATE 2.5 MG: 2.5 SOLUTION RESPIRATORY (INHALATION) at 16:48

## 2017-03-10 RX ADMIN — MAGNESIUM SULFATE HEPTAHYDRATE 2 G: 40 INJECTION, SOLUTION INTRAVENOUS at 04:16

## 2017-03-10 RX ADMIN — METHYLPREDNISOLONE SODIUM SUCCINATE 40 MG: 40 INJECTION, POWDER, FOR SOLUTION INTRAMUSCULAR; INTRAVENOUS at 15:51

## 2017-03-10 RX ADMIN — INSULIN LISPRO 1 UNITS: 100 INJECTION, SOLUTION INTRAVENOUS; SUBCUTANEOUS at 14:14

## 2017-03-10 RX ADMIN — IPRATROPIUM BROMIDE AND ALBUTEROL SULFATE 3 ML: .5; 3 SOLUTION RESPIRATORY (INHALATION) at 04:16

## 2017-03-10 RX ADMIN — ALPRAZOLAM 1 MG: 1 TABLET ORAL at 10:31

## 2017-03-10 RX ADMIN — Medication 4 MG: at 05:05

## 2017-03-10 RX ADMIN — OXYCODONE HYDROCHLORIDE AND ACETAMINOPHEN 1 TABLET: 5; 325 TABLET ORAL at 19:06

## 2017-03-10 RX ADMIN — METHYLPREDNISOLONE SODIUM SUCCINATE 125 MG: 125 INJECTION, POWDER, FOR SOLUTION INTRAMUSCULAR; INTRAVENOUS at 04:16

## 2017-03-10 RX ADMIN — INSULIN LISPRO 4 UNITS: 100 INJECTION, SOLUTION INTRAVENOUS; SUBCUTANEOUS at 19:07

## 2017-03-10 RX ADMIN — BUDESONIDE 500 MCG: 0.5 INHALANT RESPIRATORY (INHALATION) at 10:33

## 2017-03-10 RX ADMIN — HEPARIN SODIUM 5000 UNITS: 5000 INJECTION, SOLUTION INTRAVENOUS; SUBCUTANEOUS at 15:52

## 2017-03-10 RX ADMIN — FLUOXETINE 60 MG: 20 CAPSULE ORAL at 10:31

## 2017-03-10 RX ADMIN — Medication 10 ML: at 23:05

## 2017-03-10 RX ADMIN — ONDANSETRON 4 MG: 2 INJECTION INTRAMUSCULAR; INTRAVENOUS at 05:05

## 2017-03-10 RX ADMIN — DOXYCYCLINE HYCLATE 100 MG: 100 CAPSULE ORAL at 10:30

## 2017-03-10 RX ADMIN — Medication 10 ML: at 19:08

## 2017-03-10 RX ADMIN — ALBUTEROL SULFATE 2.5 MG: 2.5 SOLUTION RESPIRATORY (INHALATION) at 10:46

## 2017-03-10 RX ADMIN — METHYLPREDNISOLONE SODIUM SUCCINATE 40 MG: 40 INJECTION, POWDER, FOR SOLUTION INTRAMUSCULAR; INTRAVENOUS at 10:34

## 2017-03-10 RX ADMIN — ALBUTEROL SULFATE 2.5 MG: 2.5 SOLUTION RESPIRATORY (INHALATION) at 13:54

## 2017-03-10 RX ADMIN — HYDRALAZINE HYDROCHLORIDE 50 MG: 50 TABLET, FILM COATED ORAL at 23:04

## 2017-03-10 RX ADMIN — FAMOTIDINE 20 MG: 20 TABLET ORAL at 10:32

## 2017-03-10 RX ADMIN — ALBUTEROL SULFATE 2.5 MG: 2.5 SOLUTION RESPIRATORY (INHALATION) at 20:30

## 2017-03-10 RX ADMIN — ALPRAZOLAM 1 MG: 1 TABLET ORAL at 19:06

## 2017-03-10 RX ADMIN — SODIUM CHLORIDE 500 ML: 900 INJECTION, SOLUTION INTRAVENOUS at 04:17

## 2017-03-10 RX ADMIN — AMLODIPINE BESYLATE 10 MG: 10 TABLET ORAL at 10:30

## 2017-03-10 RX ADMIN — OXYCODONE HYDROCHLORIDE AND ACETAMINOPHEN 1 TABLET: 5; 325 TABLET ORAL at 13:54

## 2017-03-10 RX ADMIN — HYDRALAZINE HYDROCHLORIDE 50 MG: 50 TABLET, FILM COATED ORAL at 15:51

## 2017-03-10 NOTE — ED NOTES
Per ems pt was released from hospital today. Pt initially had chest discomfort. Bilateral wheezing. Pt c/o SOB. 98% on breathing tx. one albuterol and one capnography 48.

## 2017-03-10 NOTE — ED PROVIDER NOTES
HPI Comments: 3:44 AM Singh Dasilva is a 45 y.o. Female with a history of COPD, HTN, Thyroid disease, and asthma presenting to the ED via EMS with SOB that began this morning. She also reports nausea, diarrhea, productive cough, and chest discomfort as associated symptoms. She was discharged from the hospital yesterday. She was given 1 albuterol and 1 duoneb en route by EMS. Pt denies fever and vomiting. No other complaints at this time. The history is provided by the patient. Past Medical History:   Diagnosis Date    Asthma     Chronic obstructive pulmonary disease ([facility] [address]     Endocrine disease     thyroid issues    Gastrointestinal disorder     \"blockage in my stomach\"    Hypertension        No past surgical history on file. No family history on file. Social History     Social History    Marital status:      Spouse name: N/A    Number of children: N/A    Years of education: N/A     Occupational History    Not on file. Social History Main Topics    Smoking status: Never Smoker    Smokeless tobacco: Never Used    Alcohol use No      Comment: socially    Drug use: No    Sexual activity: No      Comment: last used 9 years ago     Other Topics Concern    Not on file     Social History Narrative         ALLERGIES: Review of patient's allergies indicates no known allergies. Review of Systems   Constitutional: Negative for appetite change, chills and fever. HENT: Negative for congestion, sinus pressure and trouble swallowing. Eyes: Negative for pain and visual disturbance. Respiratory: Positive for cough and shortness of breath. Negative for chest tightness and wheezing. Cardiovascular: Positive for chest pain. Negative for palpitations and leg swelling. Gastrointestinal: Positive for diarrhea and nausea. Negative for abdominal pain and vomiting. Endocrine: Negative. Genitourinary: Negative.     Musculoskeletal: Negative for arthralgias, back pain, myalgias and neck pain. Skin: Negative. Allergic/Immunologic: Negative. Neurological: Negative for dizziness, syncope, numbness and headaches. Hematological: Negative. Psychiatric/Behavioral: Negative. All other systems reviewed and are negative. Vitals:    03/10/17 0515 03/10/17 0530 03/10/17 0545 03/10/17 0552   BP: (!) 201/74 177/75 200/82    Pulse: 80 82 81    Resp: 17 18 17    Temp:       SpO2: 96% 96% 96% 96%   Weight:       Height:                Physical Exam   Constitutional: She appears well-developed and well-nourished. Non-toxic appearance. She does not have a sickly appearance. She does not appear ill. No distress. Morbidly obese   HENT:   Head: Normocephalic and atraumatic. Mouth/Throat: Oropharynx is clear and moist. No oropharyngeal exudate. Eyes: Conjunctivae and EOM are normal. Pupils are equal, round, and reactive to light. No scleral icterus. Neck: Trachea normal and normal range of motion. Neck supple. No hepatojugular reflux and no JVD present. No tracheal deviation present. No thyromegaly present. Cardiovascular: Normal rate, regular rhythm, S1 normal, S2 normal, normal heart sounds, intact distal pulses and normal pulses. Exam reveals no gallop, no S3 and no S4. No murmur heard. Pulses:       Radial pulses are 2+ on the right side, and 2+ on the left side. Dorsalis pedis pulses are 2+ on the right side, and 2+ on the left side. Pulmonary/Chest: Accessory muscle usage present. Tachypnea noted. She is in respiratory distress (mild/moderate). She has no decreased breath sounds. She has wheezes in the right upper field, the right middle field, the right lower field, the left upper field, the left middle field and the left lower field. She has no rhonchi. She has no rales. Abdominal: Soft. Normal appearance and bowel sounds are normal. She exhibits no distension, no fluid wave, no ascites and no mass. There is no hepatosplenomegaly.  There is no tenderness. There is no rigidity, no rebound, no guarding, no CVA tenderness, no tenderness at McBurney's point and negative Vasquez's sign. Musculoskeletal: Normal range of motion. Strength 5/5 throughout    Lymphadenopathy:        Head (right side): No submental, no submandibular, no preauricular and no occipital adenopathy present. Head (left side): No submental, no submandibular, no preauricular and no occipital adenopathy present. She has no cervical adenopathy. Right: No supraclavicular adenopathy present. Left: No supraclavicular adenopathy present. Neurological: She is alert. She has normal strength and normal reflexes. She is not disoriented. No cranial nerve deficit or sensory deficit. Coordination and gait normal. GCS eye subscore is 4. GCS verbal subscore is 5. GCS motor subscore is 6. Grossly intact    Skin: Skin is warm, dry and intact. No rash noted. She is not diaphoretic. Psychiatric: She has a normal mood and affect. Her speech is normal and behavior is normal. Judgment and thought content normal. Cognition and memory are normal.   Nursing note and vitals reviewed. MDM  Number of Diagnoses or Management Options  COPD with acute exacerbation [facility]):   Diagnosis management comments: COPD exacerbation   Asthma exacerbation   Pneumonia  Bronchitis       ED Course       Procedures  Labs, Radiology, EKG:  Labs Reviewed   CBC WITH AUTOMATED DIFF - Abnormal; Notable for the following:        Result Value    WBC 21.3 (*)     HCT 46.0 (*)     NEUTROPHILS 86 (*)     LYMPHOCYTES 9 (*)     ABS. NEUTROPHILS 18.3 (*)     ABS.  MONOCYTES 1.1 (*)     All other components within normal limits   METABOLIC PANEL, COMPREHENSIVE - Abnormal; Notable for the following:     Sodium 135 (*)     Chloride 98 (*)     Glucose 149 (*)     BUN 34 (*)     BUN/Creatinine ratio 31 (*)     GFR est non-AA 52 (*)     Calcium 7.9 (*)     Albumin 3.2 (*)     All other components within normal limits MAGNESIUM - Abnormal; Notable for the following:     Magnesium 2.7 (*)     All other components within normal limits   CARDIAC PANEL,(CK, CKMB & TROPONIN)        Labs essentially normal with the exception of WBC of 21.3. Cardiac enzymes negative. Chest X-Ray showed No acute process. EKG showed NSR with rate of 84 bpm. With no ST elevations or depression. 3:44 AM 3/10/2017      Progress notes, Consult notes or additional Procedure notes:   5:24 AM Gee Peres DO discussed care with Dr. Tavon Armendariz [facility]) agrees to admit. Standard discussion; including history of patients chief complaint, available diagnostic results, and treatment course. Disposition:  Diagnosis:   1. COPD with acute exacerbation ([facility] [address]      Disposition: admitted      SCRIBE ATTESTATION STATEMENT  Documented by: Dejah corrales for, and in the presence of,Paul Peres DO 3:44 AM  Signed by: Cherie Rogers, 03/10/17      PROVIDER ATTESTATION STATEMENT  I personally performed the services described in the documentation, reviewed the documentation, as recorded by the scribe in my presence, and it accurately and completely records my words and actions.   Julia Martinez DO

## 2017-03-10 NOTE — ED NOTES
Bedside shift change report given to Ascencion Palacios RN (oncoming nurse) by Orlando Elliott RN   (offgoing nurse). Report included the following information SBAR, ED Summary, MAR and Recent Results.

## 2017-03-10 NOTE — IP AVS SNAPSHOT
Summary of Care Report The Summary of Care report has been created to help improve care coordination. Users with access to [employer] or [employer] [address] (Web-based application) may access additional patient information including the Discharge Summary. If you are not currently a [address] user and need more information, please call the number listed below in the [place] [address] section and ask to be connected with Medical Records. Facility Information Name Address Phone [address] [place] [address] [address] [facility] [address] [phone] Patient Information Patient Name Sex JAVED Cruz Ask ([id number]) Female 1965 Discharge Information Admitting Provider Service Area Unit Shane Carrillo MD [address] [place] [address] 71 / [phone] Discharge Provider Discharge Date/Time Discharge Disposition Destination (none) 3/12/2017 (Pending) AHR (none) Patient Language Language ENGLISH [13] Problem List as of 3/12/2017  Date Reviewed: 3/10/2017 Codes Priority Class Noted - Resolved Asthma exacerbation attacks ICD-10-CM: J39.36 ICD-9-CM: 493.92   10/19/2015 - Present Status asthmaticus with COPD (chronic obstructive pulmonary disease) (HCC) ICD-10-CM: J44.9, J45.902 ICD-9-CM: 493.21   10/19/2015 - Present RESOLVED: COPD with acute exacerbation ([facility] ICD-10-CM: J44.1 ICD-9-CM: 491.21   2015 - 2015 RESOLVED: Asthma exacerbation ICD-10-CM: J45.901 ICD-9-CM: 493.92   2015 - 2015 Abnormal CT of the chest ICD-10-CM: R93.8 ICD-9-CM: 793.2   2015 - Present RESOLVED: COPD exacerbation ([facility] [address] ICD-10-CM: J44.1 ICD-9-CM: 491.21   2016 - 2016 RESOLVED: COPD with exacerbation ([facility] ICD-10-CM: J44.1 ICD-9-CM: 491.21   2016 - 2016  Shortness of breath ICD-10-CM: R06.02 
 ICD-9-CM: 786.05   3/5/2016 - Present Asthma ICD-10-CM: W14.758 ICD-9-CM: 493.90   3/5/2016 - Present COPD exacerbation ([place] [address].) ICD-10-CM: J44.1 ICD-9-CM: 491.21   3/12/2016 - Present Acute on chronic respiratory failure with hypoxemia [facility]) ICD-10-CM: P85.98 
ICD-9-CM: 518.84   3/12/2016 - Present COPD with exacerbation ([place] [address] ICD-10-CM: J44.1 ICD-9-CM: 491.21   7/11/2016 - Present Acute exacerbation of chronic obstructive pulmonary disease (COPD) ([place] [address] ICD-10-CM: J44.1 ICD-9-CM: 491.21   8/29/2016 - Present Respiratory failure ([place] [address] ICD-10-CM: J96.90 ICD-9-CM: 518.81   1/11/2017 - Present Acute encephalopathy ICD-10-CM: G93.40 ICD-9-CM: 348.30   1/11/2017 - Present Asthma exacerbation ICD-10-CM: U28.446 ICD-9-CM: 493.92   1/26/2017 - Present Acute respiratory failure with hypercapnia ([place] [address] ICD-10-CM: J96.02 
ICD-9-CM: 518.81   2/26/2017 - Present COPD with acute exacerbation ([place] [address] ICD-10-CM: J44.1 ICD-9-CM: 491.21   3/2/2017 - Present Essential hypertension ICD-10-CM: I10 
ICD-9-CM: 401.9   3/10/2017 - Present Morbid obesity due to excess calories [facility]) ICD-10-CM: E66.01 
ICD-9-CM: 278.01   3/10/2017 - Present Chest pain on breathing ICD-10-CM: R07.1 ICD-9-CM: 786.52   3/10/2017 - Present You are allergic to the following No active allergies Current Discharge Medication List  
  
START taking these medications Dose & Instructions Dispensing Information Comments  
 levoFLOXacin 500 mg tablet Commonly known as:  Emily Gonzalez Start taking on:  3/13/2017 Dose:  500 mg Take 1 Tab by mouth every twenty-four (24) hours. Quantity:  5 Tab Refills:  0  
   
 * OTHER Check CBC, CMP, Mg in 5 days, results to PCP immediately, Diagnosis- COPD Quantity:  1 Each Refills:  0  
   
 * OTHER Resume Home Oxygen Quantity:  1 Each Refills:  0  
   
 * OTHER Resume CPAP QHS Quantity:  1 Each Refills:  0 oxyCODONE-acetaminophen  mg per tablet Commonly known as:  PERCOCET 10 Dose:  1 Tab Take 1 Tab by mouth every eight (8) hours as needed. Max Daily Amount: 3 Tabs. Quantity:  10 Tab Refills:  0  
   
 * Notice: This list has 3 medication(s) that are the same as other medications prescribed for you. Read the directions carefully, and ask your doctor or other care provider to review them with you. CONTINUE these medications which have CHANGED Dose & Instructions Dispensing Information Comments * albuterol 90 mcg/actuation inhaler Commonly known as:  VENTOLIN HFA What changed:  Another medication with the same name was changed. Make sure you understand how and when to take each. Dose:  2 Puff Take 2 Puffs by inhalation every six (6) hours as needed for Wheezing or Shortness of Breath. Quantity:  1 Inhaler Refills:  1  
   
 * albuterol 2.5 mg /3 mL (0.083 %) nebulizer solution Commonly known as:  PROVENTIL VENTOLIN What changed:  reasons to take this Dose:  2.5 mg  
3 mL by Nebulization route every four (4) hours as needed for Wheezing or Shortness of Breath. Quantity:  100 Each Refills:  1  
   
 predniSONE 10 mg tablet Commonly known as:  Leah Cuna What changed:   
- medication strength 
- additional instructions 4 tabs ( 40 mg ) po daily for 3 days, then 2 tabs ( 20 mg ) po daily for 3 days, then 1 tab ( 10 mg ) po daily for 3 days, then stop Quantity:  21 Tab Refills:  0  
   
 * Notice: This list has 2 medication(s) that are the same as other medications prescribed for you. Read the directions carefully, and ask your doctor or other care provider to review them with you. CONTINUE these medications which have NOT CHANGED Dose & Instructions Dispensing Information Comments ALPRAZolam 1 mg tablet Commonly known as:  Ezzie Vickey Dose:  1 mg Take 1 Tab by mouth two (2) times a day. Max Daily Amount: 2 mg. Quantity:  30 Tab Refills:  0  
   
 amLODIPine 10 mg tablet Commonly known as:  Corinne Lees Dose:  10 mg Take 1 Tab by mouth daily. Quantity:  30 Tab Refills:  1  
   
 arformoterol 15 mcg/2 mL Nebu neb solution Commonly known as:  Tiera Fresh Dose:  15 mcg  
2 mL by Nebulization route two (2) times a day. Quantity:  60 Vial  
Refills:  1  
   
 budesonide 0.5 mg/2 mL Nbsp Commonly known as:  PULMICORT Dose:  500 mcg 2 mL by Nebulization route two (2) times a day. Quantity:  60 Each Refills:  1  
   
 budesonide-formoterol 160-4.5 mcg/actuation HFA inhaler Commonly known as:  SYMBICORT Dose:  2 Puff Take 2 Puffs by inhalation two (2) times a day. Quantity:  1 Inhaler Refills:  1  
   
 bumetanide 0.5 mg tablet Commonly known as:  Verenice Davis Dose:  0.5 mg Take 1 Tab by mouth every fourty-eight (48) hours. Quantity:  30 Tab Refills:  1  
   
 famotidine 20 mg tablet Commonly known as:  PEPCID Dose:  20 mg Take 1 Tab by mouth daily. Quantity:  30 Tab Refills:  1 FLUoxetine 20 mg capsule Commonly known as:  PROzac Dose:  60 mg Take 3 Caps by mouth daily. Quantity:  90 Cap Refills:  1  
   
 hydrALAZINE 50 mg tablet Commonly known as:  APRESOLINE Dose:  50 mg Take 1 Tab by mouth three (3) times daily. Quantity:  90 Tab Refills:  1  
   
 montelukast 10 mg tablet Commonly known as:  SINGULAIR Dose:  10 mg Take 1 Tab by mouth nightly. Quantity:  30 Tab Refills:  1  
   
 tiotropium 18 mcg inhalation capsule Commonly known as:  Severiano Agreloretta Dose:  1 Cap Take 1 Cap by inhalation daily. Quantity:  30 Cap Refills:  1 Current Immunizations Name Date Influenza Vaccine (Quad) PF 10/26/2015 Pneumococcal Polysaccharide (PPSV-23) 10/26/2015 Follow-up Information Follow up With Details Comments Contact Info Shalini Knox MD   Patient can only remember the practice name and not the physician Discharge Instructions DISCHARGE SUMMARY from Nurse The following personal items are in your possession at time of discharge: 
 
Dental Appliances: None Visual Aid: At home, Glasses Home Medications: None Jewelry: Earrings, With patient Clothing: At bedside, Pants, Shirt Other Valuables: None Personal Items Sent to Safe: none PATIENT INSTRUCTIONS: 
 
 
F-face looks uneven A-arms unable to move or move unevenly S-speech slurred or non-existent T-time-call 911 as soon as signs and symptoms begin-DO NOT go Back to bed or wait to see if you get better-TIME IS BRAIN. Warning Signs of HEART ATTACK Call 911 if you have these symptoms: 
? Chest discomfort. Most heart attacks involve discomfort in the center of the chest that lasts more than a few minutes, or that goes away and comes back. It can feel like uncomfortable pressure, squeezing, fullness, or pain. ? Discomfort in other areas of the upper body. Symptoms can include pain or discomfort in one or both arms, the back, neck, jaw, or stomach. ? Shortness of breath with or without chest discomfort. ? Other signs may include breaking out in a cold sweat, nausea, or lightheadedness. Don't wait more than five minutes to call [address]! Fast action can save your life. Calling 911 is almost always the fastest way to get lifesaving treatment. Emergency Medical Services staff can begin treatment when they arrive  up to an hour sooner than if someone gets to the hospital by car. The discharge information has been reviewed with the patient. The patient verbalized understanding.  
 
Discharge medications reviewed with the patient and appropriate educational materials and side effects teaching were provided. Patient armband removed and shredded MyChart Activation Thank you for requesting access to [employer]. Please follow the instructions below to securely access and download your online medical record. [employer] allows you to send messages to your doctor, view your test results, renew your prescriptions, schedule appointments, and more. How Do I Sign Up? 1. In your internet browser, go to [email or web address] 
2. Click on the First Time User? Click Here link in the Sign In box. You will be redirect to the New Member Sign Up page. 3. Enter your [employer] Access Code exactly as it appears below. You will not need to use this code after youve completed the sign-up process. If you do not sign up before the expiration date, you must request a new code. [employer] Access Code: [email or web address] Expires: 2017  4:13 AM (This is the date your [employer] access code will ) 4. Enter the last four digits of your Social Security Number (xxxx) and Date of Birth (mm/dd/yyyy) as indicated and click Submit. You will be taken to the next sign-up page. 5. Create a [employer] ID. This will be your [employer] login ID and cannot be changed, so think of one that is secure and easy to remember. 6. Create a [employer] password. You can change your password at any time. 7. Enter your Password Reset Question and Answer. This can be used at a later time if you forget your password. 8. Enter your e-mail address. You will receive e-mail notification when new information is available in [address] 9. Click Sign Up. You can now view and download portions of your medical record. 10. Click the Download Summary menu link to download a portable copy of your medical information. Additional Information If you have questions, please visit the Frequently Asked Questions section of the [employer] website at [email or web address] Remember, MyChart is NOT to be used for urgent needs. For medical emergencies, dial 911. Chart Review Routing History Recipient Method Report Sent By Huber Aggarwal MD  
Phone: [phone] In Basket IP Auto Routed Rd SPENCE MD [[id number]] 10/20/2015  6:00 PM 10/20/2015  
 [facility] Fax: [phone] [facility] IMAGING Maddie Ibarra [[id number]] 10/25/2015  7:36 PM 10/25/2015 Barrington Do MD  
Phone: [phone] In [employer] IP Auto Routed Trans Barrington Do MD [[id number]] 10/26/2015  5:56 PM 10/26/2015 Rashida Aggarwal MD  
Phone: [phone] In [employer] IP Auto Routed Trans Barrington Do MD [[id number]] 10/26/2015  5:56 PM 10/26/2015 Rashida Aggarwal MD  
Phone: [phone] In Basket IP Auto Routed Rd SPENCE MD [[id number]] 11/1/2015 11:15 AM 11/01/2015 Humberto Holloway MD  
Phone: [phone] In Basket IP Auto Routed [address], [place] [[id number]] 11/2/2015  2:34 PM 11/02/2015 Rashida Aggarwal MD  
Phone: [phone] In Basket IP Auto Routed [address], [place] [[id number]] 11/2/2015  2:34 PM 11/02/2015 Ronel Enrique MD  
Phone: [phone] In Basket IP Auto Routed [address], [place] [[id number]] 11/18/2015  8:17 AM 11/18/2015 Humberto Holloway MD  
Phone: [phone] In Basket IP Auto Routed [address], [place] [[id number]] 11/18/2015  8:17 AM 11/18/2015 Pino Ruiz MD  
Phone: [phone] In Basket IP Auto Routed [address], [place] [[id number]] 11/18/2015  8:17 AM 11/18/2015 Kumar Maynard RN Phone: [phone] In Basket Notes/Transcriptions [facility], [address] [place] [address] [[id number]] 11/23/2015  2:17 PM 11/20/2015 Meryle Blamer, NP Phone: [phone] In [employer] IP Auto Routed [address] [facility] [place] [[id number]] 11/26/2015 12:40 PM 11/26/2015 Yael Bonilla MD  
Phone: [phone]  In [employer] IP Auto Routed [address] [facility] [address], [place] [[id number]] 11/26/2015 12:40 PM 11/26/2015 Alcira Joseph MD  
Phone: [phone] In [employer] IP Auto Routed [address] [facility], [place] [[id number]] 11/26/2015 12:40 PM 11/26/2015 Sharlyn Schirmer, MD  
Phone: [phone] In [employer] Routed [facility], [address] [place] [address] [[id number]] 2/7/2016 12:07 PM 02/07/2016 Bret Mendez MD  
Fax: [phone] Phone: [phone] Fax IP Auto Routed Trans Laci Doyle MD [[id number]] 3/5/2016  8:21 PM 03/05/2016 Edel Oakes MD  
Phone: [phone] In Basket IP Auto Routed Trans Laci Doyle MD [[id number]] 3/5/2016  8:21 PM 03/05/2016 Viral Hough DO Phone: [phone] In Basket IP Auto Routed Trans Laci Doyle MD [[id number]] 3/5/2016  8:21 PM 03/05/2016 Laci Doyle MD  
Phone: [phone] In Basket IP Auto Routed Trans Laci Doyle MD [[id number]] 3/5/2016  8:21 PM 03/05/2016 Bret Mendez MD  
Fax: [phone] Phone: [phone] Fax IP Auto Routed Mario Tanner III, MD [[id number]] 3/18/2016  8:44 PM 03/18/2016 Mikhail Fay MD  
Phone: [phone] In Basket IP Auto Routed Mario Tanner III, MD [[id number]] 3/18/2016  8:44 PM 03/18/2016 Alfredo Lynn MD  
Phone: [phone] In Basket IP Auto Routed Annita Palomo MD [[id number]] 3/20/2016  2:36 PM 03/20/2016 Bret Mendez MD  
Fax: [phone] Phone: [phone] Fax Riri Colmenares Routed Annita Palomo  [phone] 3/20/2016  2:36 PM 03/20/2016 Laine Roth MD  
Phone: [phone] In Basket IP Auto Routed Annita Palomo MD [[id number]] 3/20/2016  2:36 PM 03/20/2016 Mikhail Fay MD  
Phone: [phone] In Basket IP Auto Routed Annita Palomo MD [[id number]] 3/20/2016  2:36 PM 03/20/2016 Bret Mendez MD  
Fax: [phone] Phone: [phone] Fax IP Auto Routed Annita Palomo MD [[id number]] 5/19/2016  3:19 PM 05/19/2016 Renuka Stearns MD  
Phone: [phone] In Basket IP Auto Routed Annita Palomo MD [[id number]] 5/19/2016  3:19 PM 05/19/2016 James [place] MD  
Phone: [phone] In Basket IP Auto Routed Annita Palomo MD [[id number]] 5/19/2016  3:19 PM 05/19/2016 Parul Nava MD  
Phone: [phone] In Basket IP Auto Routed Annita Palomo MD [[id number]] 5/19/2016  3:19 PM 05/19/2016 Claudia Camara MD  
Fax: [phone] Phone: [phone] Fax IP Auto Routed MD Michelle [[id number]] 7/4/2016  4:21 PM 07/04/2016 Renetta Rock MD  
Phone: [phone] In [employer] IP Auto Routed MD Michelle [[id number]] 7/4/2016  4:21 PM 07/04/2016 Roddy Cabral MD  
Phone: [phone] In [employer] IP Auto Routed MD Michelle [[id number]] 7/4/2016  4:21 PM 07/04/2016 Parul Nava MD  
Phone: [phone] In [employer] IP Auto Routed MD Michelle [[id number]] 7/4/2016  4:21 PM 07/04/2016 Claudia Camara MD  
Fax: [phone] Phone: [phone] Fax IP Auto Routed Trans Sal Burnette MD [[id number]] 7/14/2016  3:34 PM 07/14/2016 Aashish Morel MD  
Phone: [phone] In Basket IP Auto Routed Trans Sal Burnette MD [[id number]] 7/14/2016  3:34 PM 07/14/2016 Sal Burnette MD  
Phone: [phone] In Basket IP Auto Routed Trans Sal Burnette MD [[id number]] 7/14/2016  3:34 PM 07/14/2016 Naeem Enriquez MD  
Fax: [phone] Phone: [phone] Fax IP Auto Routed Trans Sal Burnette MD [[id number]] 7/14/2016  3:34 PM 07/14/2016 Beverly Wilde RN Phone: [phone] In Basket Notes/Transcriptions Marco A Espinoza, [facility], [id number] [facility] [[id number]] 7/14/2016  4:09 PM 07/14/2016 Claudia Camara MD  
Fax: [phone] Phone: [phone] Fax [facility] MD NOTES AUTO ROUTING REPORT Sasha Davis MD [[id number]] 9/6/2016  9:11 AM 09/06/2016 Denzel Alexis MD  
Fax: [phone] Phone: [phone] Fax IP Auto Routed Annita Palomo  [phone] 1/15/2017 11:48 AM 01/15/2017 Komal Gong MD  
Phone: [phone] In Basket IP Auto Routed Annita Palomo MD [[id number]] 1/15/2017 11:48 AM 01/15/2017

## 2017-03-10 NOTE — CONSULTS
[facility]  Pulmonary, Critical Care, and Sleep Medicine    Initial Patient Consult    Name: Jonathan Boswell MRN: [id number]   : 1965 Hospital: [address] [place] [address] Dr   Date: 3/10/2017  Room: [place] [address]     Subjective: This patient has been seen and evaluated at the request of Dr. Santiago Yao for COPD exacerbation. Patient is a 45 y.o. female with PMH significant for severe asthma, who was discharged from hospital yesterday for similar complaints, comes in complaining of worsening dyspnea. Patient has had multiple hospitalizations for similar complaints over the last few months. Patient reports that once she got home yesterday she started getting more short of breath with exertion and couldn't go to her bathroom without getting short of breath. She reports coughing up yellowish green phlegm. Patient has a h/o MY but is non-compliant with PAP therapy. She denies any hemoptysis, pleuritic pain, nausea, vomiting or headache. She denies any history suggestive of aspiration. She has lost close to 45 lbs over the last year. Past Medical History:   Diagnosis Date    Asthma     Chronic obstructive pulmonary disease ([address]     Endocrine disease     thyroid issues    Gastrointestinal disorder     \"blockage in my stomach\"    Hypertension       No past surgical history on file.    Current Facility-Administered Medications   Medication Dose Route Frequency    ALPRAZolam (XANAX) tablet 1 mg  1 mg Oral BID    amLODIPine (NORVASC) tablet 10 mg  10 mg Oral DAILY    bumetanide (BUMEX) tablet 0.5 mg  0.5 mg Oral Q48H    famotidine (PEPCID) tablet 20 mg  20 mg Oral DAILY    FLUoxetine (PROzac) capsule 60 mg  60 mg Oral DAILY    montelukast (SINGULAIR) tablet 10 mg  10 mg Oral QHS    tiotropium (SPIRIVA) inhalation capsule 18 mcg  1 Cap Inhalation DAILY    albuterol (PROVENTIL VENTOLIN) nebulizer solution 2.5 mg  2.5 mg Nebulization Q4H RT    arformoterol (BROVANA) neb solution 15 mcg  15 mcg Nebulization BID    budesonide (PULMICORT) 500 mcg/2 ml nebulizer suspension  500 mcg Nebulization BID    hydrALAZINE (APRESOLINE) tablet 50 mg  50 mg Oral TID    methylPREDNISolone (PF) (SOLU-MEDROL) injection 40 mg  40 mg IntraVENous Q8H    sodium chloride (NS) flush 5-10 mL  5-10 mL IntraVENous Q8H    sodium chloride (NS) flush 5-10 mL  5-10 mL IntraVENous PRN    heparin (porcine) injection 5,000 Units  5,000 Units SubCUTAneous Q8H    acetaminophen (TYLENOL) tablet 650 mg  650 mg Oral Q6H PRN    oxyCODONE-acetaminophen (PERCOCET) 5-325 mg per tablet 1 Tab  1 Tab Oral Q6H PRN    levoFLOXacin (LEVAQUIN) 750 mg in D5W IVPB  750 mg IntraVENous Q24H    insulin lispro (HUMALOG) injection   SubCUTAneous AC&HS    glucose chewable tablet 16 g  4 Tab Oral PRN    glucagon (GLUCAGEN) injection 1 mg  1 mg IntraMUSCular PRN    dextrose (D50W) injection syrg 12.5-25 g  25-50 mL IntraVENous PRN     Current Outpatient Prescriptions   Medication Sig    albuterol (VENTOLIN HFA) 90 mcg/actuation inhaler Take 2 Puffs by inhalation every six (6) hours as needed for Wheezing or Shortness of Breath.  ALPRAZolam (XANAX) 1 mg tablet Take 1 Tab by mouth two (2) times a day. Max Daily Amount: 2 mg.  amLODIPine (NORVASC) 10 mg tablet Take 1 Tab by mouth daily.  budesonide-formoterol (SYMBICORT) 160-4.5 mcg/actuation HFA inhaler Take 2 Puffs by inhalation two (2) times a day.  bumetanide (BUMEX) 0.5 mg tablet Take 1 Tab by mouth every fourty-eight (48) hours.  famotidine (PEPCID) 20 mg tablet Take 1 Tab by mouth daily.  FLUoxetine (PROZAC) 20 mg capsule Take 3 Caps by mouth daily.  montelukast (SINGULAIR) 10 mg tablet Take 1 Tab by mouth nightly.  predniSONE (DELTASONE) 20 mg tablet Take 3 tabs by mouth daily for 4 days, then 2 tabs daily for 4 days, then 1 tab daily for 4 days, then STOP.  tiotropium (SPIRIVA) 18 mcg inhalation capsule Take 1 Cap by inhalation daily.     albuterol (PROVENTIL VENTOLIN) 2.5 mg /3 mL (0.083 %) nebulizer solution 3 mL by Nebulization route every four (4) hours as needed for Wheezing.  arformoterol (BROVANA) 15 mcg/2 mL nebu neb solution 2 mL by Nebulization route two (2) times a day.  budesonide (PULMICORT) 0.5 mg/2 mL nbsp 2 mL by Nebulization route two (2) times a day.  hydrALAZINE (APRESOLINE) 50 mg tablet Take 1 Tab by mouth three (3) times daily. Prior to Admission medications    Medication Sig Start Date End Date Taking? Authorizing Provider   albuterol (VENTOLIN HFA) 90 mcg/actuation inhaler Take 2 Puffs by inhalation every six (6) hours as needed for Wheezing or Shortness of Breath. 3/9/17   Ny Tyson MD   ALPRAZolam [facility]) 1 mg tablet Take 1 Tab by mouth two (2) times a day. Max Daily Amount: 2 mg. 3/9/17   Ny Tyson MD   amLODIPine (NORVASC) 10 mg tablet Take 1 Tab by mouth daily. 3/9/17   Ny Tyson MD   budesonide-formoterol [facility]) 160-4.5 mcg/actuation HFA inhaler Take 2 Puffs by inhalation two (2) times a day. 3/9/17   Ny Tyson MD   bumetanide (BUMEX) 0.5 mg tablet Take 1 Tab by mouth every fourty-eight (48) hours. 3/9/17   Ny Tyson MD   famotidine (PEPCID) 20 mg tablet Take 1 Tab by mouth daily. 3/9/17   Ny Tyson MD   FLUoxetine (PROZAC) 20 mg capsule Take 3 Caps by mouth daily. 3/9/17   Ny Tyson MD   montelukast (SINGULAIR) 10 mg tablet Take 1 Tab by mouth nightly. 3/9/17   Ny Tyson MD   predniSONE (DELTASONE) 20 mg tablet Take 3 tabs by mouth daily for 4 days, then 2 tabs daily for 4 days, then 1 tab daily for 4 days, then STOP. 3/9/17   Ny Tyson MD   tiotropium [facility]) 18 mcg inhalation capsule Take 1 Cap by inhalation daily. 3/9/17   Ny Tyson MD   albuterol (PROVENTIL VENTOLIN) 2.5 mg /3 mL (0.083 %) nebulizer solution 3 mL by Nebulization route every four (4) hours as needed for Wheezing.  3/9/17   Ny Tyson MD   arformoterol (BROVANA) 15 mcg/2 mL nebu neb solution 2 mL by Nebulization route two (2) times a day. 3/9/17   Migue Russell MD   budesonide (PULMICORT) 0.5 mg/2 mL nbsp 2 mL by Nebulization route two (2) times a day. 3/9/17   Migue Russell MD   hydrALAZINE (APRESOLINE) 50 mg tablet Take 1 Tab by mouth three (3) times daily. 3/9/17   Migue Russell MD     No Known Allergies   Social History   Substance Use Topics    Smoking status: Never Smoker    Smokeless tobacco: Never Used    Alcohol use No      Comment: socially      No family history on file. Review of Systems:  See HPI for details. All others negative on review.     Objective:   Vital Signs:    Visit Vitals    /69    Pulse 87    Temp 97.7 °F (36.5 °C)    Resp 17    Ht 4' 11\" (1.499 m)    Wt 114.3 kg (252 lb)    SpO2 95%    BMI 50.9 kg/m2       O2 Device: Room air   O2 Flow Rate (L/min): 2 l/min   Temp (24hrs), Av.7 °F (36.5 °C), Min:97.7 °F (36.5 °C), Max:97.7 °F (36.5 °C)       Intake/Output:   No intake or output data in the 24 hours ending 03/10/17 1444      Physical Exam:   General: comfortable, NAD  HEENT: OP normal, no thrush, cornea normal, no lacrimal gland enlargement  Neck: No adenopathy or thyroid swelling  CVS: S1S2 no murmurs  RS: decreased BS with b/l expiratory wheezing +  Abd: soft, non tender, no hepatosplenomegaly  Neuro: non focal, awake, alert  Extrm: no leg edema   Skin: no rash  Psych: normal mood    Data review:   Labs:  Recent Results (from the past 12 hour(s))   EKG, 12 LEAD, INITIAL    Collection Time: 03/10/17  3:48 AM   Result Value Ref Range    Ventricular Rate 84 BPM    Atrial Rate 84 BPM    P-R Interval 114 ms    QRS Duration 78 ms    Q-T Interval 356 ms    QTC Calculation (Bezet) 420 ms    Calculated P Axis 73 degrees    Calculated R Axis 49 degrees    Calculated T Axis 37 degrees    Diagnosis       Normal sinus rhythm  Possible Left atrial enlargement  Borderline ECG  When compared with ECG of 02-MAR-2017 04:13,  Nonspecific T wave abnormality now evident in Inferior leads     CBC WITH AUTOMATED DIFF    Collection Time: 03/10/17  4:00 AM   Result Value Ref Range    WBC 21.3 (H) 4.6 - 13.2 K/uL    RBC 5.23 4.20 - 5.30 M/uL    HGB 15.1 12.0 - 16.0 g/dL    HCT 46.0 (H) 35.0 - 45.0 %    MCV 88.0 74.0 - 97.0 FL    MCH 28.9 24.0 - 34.0 PG    MCHC 32.8 31.0 - 37.0 g/dL    RDW 13.4 11.6 - 14.5 %    PLATELET 878 731 - 133 K/uL    MPV 10.6 9.2 - 11.8 FL    NEUTROPHILS 86 (H) 42 - 75 %    LYMPHOCYTES 9 (L) 20 - 51 %    MONOCYTES 5 2 - 9 %    EOSINOPHILS 0 0 - 5 %    BASOPHILS 0 0 - 3 %    ABS. NEUTROPHILS 18.3 (H) 1.8 - 8.0 K/UL    ABS. LYMPHOCYTES 1.9 0.8 - 3.5 K/UL    ABS. MONOCYTES 1.1 (H) 0 - 1.0 K/UL    ABS. EOSINOPHILS 0.0 0.0 - 0.4 K/UL    ABS. BASOPHILS 0.0 0.0 - 0.06 K/UL    DF MANUAL      PLATELET COMMENTS ADEQUATE PLATELETS      RBC COMMENTS STOMATOCYTES  1+       METABOLIC PANEL, COMPREHENSIVE    Collection Time: 03/10/17  4:00 AM   Result Value Ref Range    Sodium 135 (L) 136 - 145 mmol/L    Potassium 3.8 3.5 - 5.5 mmol/L    Chloride 98 (L) 100 - 108 mmol/L    CO2 28 21 - 32 mmol/L    Anion gap 9 3.0 - 18 mmol/L    Glucose 149 (H) 74 - 99 mg/dL    BUN 34 (H) 7.0 - 18 MG/DL    Creatinine 1.10 0.6 - 1.3 MG/DL    BUN/Creatinine ratio 31 (H) 12 - 20      GFR est AA >60 >60 ml/min/1.73m2    GFR est non-AA 52 (L) >60 ml/min/1.73m2    Calcium 7.9 (L) 8.5 - 10.1 MG/DL    Bilirubin, total 0.3 0.2 - 1.0 MG/DL    ALT (SGPT) 50 13 - 56 U/L    AST (SGOT) 17 15 - 37 U/L    Alk.  phosphatase 91 45 - 117 U/L    Protein, total 6.5 6.4 - 8.2 g/dL    Albumin 3.2 (L) 3.4 - 5.0 g/dL    Globulin 3.3 2.0 - 4.0 g/dL    A-G Ratio 1.0 0.8 - 1.7     CARDIAC PANEL,(CK, CKMB & TROPONIN)    Collection Time: 03/10/17  4:00 AM   Result Value Ref Range    CK 95 26 - 192 U/L    CK - MB 2.1 <3.6 ng/ml    CK-MB Index 2.2 0.0 - 4.0 %    Troponin-I, Qt. 0.03 0.0 - 0.045 NG/ML   MAGNESIUM    Collection Time: 03/10/17  4:00 AM   Result Value Ref Range    Magnesium 2.7 (H) 1.8 - 2.4 mg/dL   HEMOGLOBIN A1C WITH EAG    Collection Time: 03/10/17  4:00 AM   Result Value Ref Range    Hemoglobin A1c 5.4 4.2 - 5.6 %    Est. average glucose 108 mg/dL   POC G3    Collection Time: 03/10/17  7:38 AM   Result Value Ref Range    Device: NASAL CANNULA      Flow rate (POC) 2.0 L/M    FIO2 (POC) 28 %    pH (POC) 7.315 (L) 7.35 - 7.45      pCO2 (POC) 51.8 (H) 35.0 - 45.0 MMHG    pO2 (POC) 85 80 - 100 MMHG    HCO3 (POC) 26.4 (H) 22 - 26 MMOL/L    sO2 (POC) 95 92 - 97 %    Base deficit (POC) 1 mmol/L    Allens test (POC) N/A      Total resp. rate 16      Site LEFT RADIAL      Specimen type (POC) ARTERIAL      Performed by [id number], POC    Collection Time: 03/10/17  2:17 PM   Result Value Ref Range    Glucose (POC) 131 (H) 70 - 110 mg/dL       ABG:    Lab Results   Component Value Date/Time    PHI 7.315 (L) 03/10/2017 07:38 AM    PCO2I 51.8 (H) 03/10/2017 07:38 AM    PO2I 85 03/10/2017 07:38 AM    HCO3I 26.4 (H) 03/10/2017 07:38 AM    FIO2I 28 03/10/2017 07:38 AM       Imaging:  I have personally reviewed the patients radiographs and have reviewed the reports:  CxR 3/10/2017 : Pulmonary vascular congestion noted, no acute infiltrates     IMPRESSION:   · Acute exacerbation of Severe asthma / COPD  · MY-non compliant with PAP therapy  · Obesity      RECOMMENDATIONS:   · Continue IV steroids, BD, levaquin. Check sputum culture. · Can try BIPAP 12/5 prn and at night if patient agreeable. Wean O2 as tolerated to maintain sats > 90%. · Will recommend to be careful with narcotics/benzos. · DVT prophylaxis. · Out-patient follow up with pulmonary clinic upon discharge. Thank you for the consultation.           Adalberto Cain MD

## 2017-03-10 NOTE — IP AVS SNAPSHOT
[address] Patient: Kenneth Cortez MRN: [id number] :1965 You are allergic to the following No active allergies Recent Documentation Height Weight Breastfeeding? BMI Smoking Status 1.499 m 114.3 kg No 50.9 kg/m2 Never Smoker Unresulted Labs Order Current Status CULTURE, RESPIRATORY/SPUTUM/BRONCH W GRAM STAIN Preliminary result Emergency Contacts Name Discharge Info Relation Home Work Mobile Monica Denis  Daughter [21] [phone] Altamese Carrier  Daughter [21] [phone] Kate Guo [5] [phone] About your hospitalization You were admitted on:  March 10, 2017 You last received care in the:  [facility] [address] You were discharged on:  2017 Unit phone number:  [phone] Why you were hospitalized Your primary diagnosis was:  Not on File Your diagnoses also included:  Copd Exacerbation (Hcc), Essential Hypertension, Morbid Obesity Due To Excess Calories (Hcc), Chest Pain On Breathing, Copd With Acute Exacerbation (Hcc) Providers Seen During Your Hospitalizations Provider Role Specialty Primary office phone Des Grijalva DO Attending Provider Emergency Medicine [phone] Avanell Cabot, MD Attending Provider Internal Medicine Number not on file Your Primary Care Physician (PCP) Primary Care Physician Office Phone Office Fax OTHER, PHYS ** None ** ** None ** Follow-up Information Follow up With Details Comments Contact Info Shalini Knox MD   Patient can only remember the practice name and not the physician Your Appointments 2017  2:15 PM EDT Follow Up with Kristofer Rivera MD  
[id number]  [address] ([address]) [address] [place] [address], [address] [facility] [address]  
[phone] Current Discharge Medication List  
  
START taking these medications Dose & Instructions Dispensing Information Comments Morning Noon Evening Bedtime  
 levoFLOXacin 500 mg tablet Commonly known as:  Dona Arana Start taking on:  3/13/2017 Your next dose is: Today, Tomorrow Other:  _________ Dose:  500 mg Take 1 Tab by mouth every twenty-four (24) hours. Quantity:  5 Tab Refills:  0  
     
   
   
   
  
 * OTHER Your next dose is: Today, Tomorrow Other:  _________ Check CBC, CMP, Mg in 5 days, results to PCP immediately, Diagnosis- COPD Quantity:  1 Each Refills:  0  
     
   
   
   
  
 * OTHER Your next dose is: Today, Tomorrow Other:  _________ Resume Home Oxygen Quantity:  1 Each Refills:  0  
     
   
   
   
  
 * OTHER Your next dose is: Today, Tomorrow Other:  _________ Resume CPAP QHS Quantity:  1 Each Refills:  0  
     
   
   
   
  
 oxyCODONE-acetaminophen  mg per tablet Commonly known as:  PERCOCET 10 Your next dose is: Today, Tomorrow Other:  _________ Dose:  1 Tab Take 1 Tab by mouth every eight (8) hours as needed. Max Daily Amount: 3 Tabs. Quantity:  10 Tab Refills:  0  
     
   
   
   
  
 * Notice: This list has 3 medication(s) that are the same as other medications prescribed for you. Read the directions carefully, and ask your doctor or other care provider to review them with you. CONTINUE these medications which have CHANGED Dose & Instructions Dispensing Information Comments Morning Noon Evening Bedtime * albuterol 90 mcg/actuation inhaler Commonly known as:  VENTOLIN HFA What changed:  Another medication with the same name was changed. Make sure you understand how and when to take each. Your next dose is: Today, Tomorrow Other:  _________ Dose:  2 Puff Take 2 Puffs by inhalation every six (6) hours as needed for Wheezing or Shortness of Breath. Quantity:  1 Inhaler Refills:  1  
     
   
   
   
  
 * albuterol 2.5 mg /3 mL (0.083 %) nebulizer solution Commonly known as:  PROVENTIL VENTOLIN What changed:  reasons to take this Your next dose is: Today, Tomorrow Other:  _________ Dose:  2.5 mg  
3 mL by Nebulization route every four (4) hours as needed for Wheezing or Shortness of Breath. Quantity:  100 Each Refills:  1  
     
   
   
   
  
 predniSONE 10 mg tablet Commonly known as:  Twyla Sandoval What changed:   
- medication strength 
- additional instructions Your next dose is: Today, Tomorrow Other:  _________  
   
   
 4 tabs ( 40 mg ) po daily for 3 days, then 2 tabs ( 20 mg ) po daily for 3 days, then 1 tab ( 10 mg ) po daily for 3 days, then stop Quantity:  21 Tab Refills:  0  
     
   
   
   
  
 * Notice: This list has 2 medication(s) that are the same as other medications prescribed for you. Read the directions carefully, and ask your doctor or other care provider to review them with you. CONTINUE these medications which have NOT CHANGED Dose & Instructions Dispensing Information Comments Morning Noon Evening Bedtime ALPRAZolam 1 mg tablet Commonly known as:  Deonte Inch Your next dose is: Today, Tomorrow Other:  _________ Dose:  1 mg Take 1 Tab by mouth two (2) times a day. Max Daily Amount: 2 mg. Quantity:  30 Tab Refills:  0  
     
   
   
   
  
 amLODIPine 10 mg tablet Commonly known as:  Jamil Torreza Your next dose is: Today, Tomorrow Other:  _________ Dose:  10 mg Take 1 Tab by mouth daily. Quantity:  30 Tab Refills:  1  
     
   
   
   
  
 arformoterol 15 mcg/2 mL Nebu neb solution Commonly known as:  Norman Butcher Your next dose is: Today, Tomorrow Other:  _________ Dose:  15 mcg 2 mL by Nebulization route two (2) times a day. Quantity:  60 Vial  
Refills:  1  
     
   
   
   
  
 budesonide 0.5 mg/2 mL Nbsp Commonly known as:  PULMICORT Your next dose is: Today, Tomorrow Other:  _________ Dose:  500 mcg 2 mL by Nebulization route two (2) times a day. Quantity:  60 Each Refills:  1  
     
   
   
   
  
 budesonide-formoterol 160-4.5 mcg/actuation HFA inhaler Commonly known as:  SYMBICORT Your next dose is: Today, Tomorrow Other:  _________ Dose:  2 Puff Take 2 Puffs by inhalation two (2) times a day. Quantity:  1 Inhaler Refills:  1  
     
   
   
   
  
 bumetanide 0.5 mg tablet Commonly known as:  Mayda Hove Your next dose is: Today, Tomorrow Other:  _________ Dose:  0.5 mg Take 1 Tab by mouth every fourty-eight (48) hours. Quantity:  30 Tab Refills:  1  
     
   
   
   
  
 famotidine 20 mg tablet Commonly known as:  PEPCID Your next dose is: Today, Tomorrow Other:  _________ Dose:  20 mg Take 1 Tab by mouth daily. Quantity:  30 Tab Refills:  1 FLUoxetine 20 mg capsule Commonly known as:  PROzac Your next dose is: Today, Tomorrow Other:  _________ Dose:  60 mg Take 3 Caps by mouth daily. Quantity:  90 Cap Refills:  1  
     
   
   
   
  
 hydrALAZINE 50 mg tablet Commonly known as:  APRESOLINE Your next dose is: Today, Tomorrow Other:  _________ Dose:  50 mg Take 1 Tab by mouth three (3) times daily. Quantity:  90 Tab Refills:  1  
     
   
   
   
  
 montelukast 10 mg tablet Commonly known as:  SINGULAIR Your next dose is: Today, Tomorrow Other:  _________ Dose:  10 mg Take 1 Tab by mouth nightly. Quantity:  30 Tab Refills:  1  
     
   
   
   
  
 tiotropium 18 mcg inhalation capsule Commonly known as:  Zbigniew Loza Your next dose is: Today, Tomorrow Other:  _________ Dose:  1 Cap Take 1 Cap by inhalation daily. Quantity:  30 Cap Refills:  1 Where to Get Your Medications Information on where to get these meds will be given to you by the nurse or doctor. ! Ask your nurse or doctor about these medications  
  albuterol 2.5 mg /3 mL (0.083 %) nebulizer solution  
 levoFLOXacin 500 mg tablet OTHER  
 OTHER  
 OTHER  
 oxyCODONE-acetaminophen  mg per tablet  
 predniSONE 10 mg tablet Discharge Instructions DISCHARGE SUMMARY from Nurse The following personal items are in your possession at time of discharge: 
 
Dental Appliances: None Visual Aid: At home, Glasses Home Medications: None Jewelry: Earrings, With patient Clothing: At bedside, Pants, Shirt Other Valuables: None Personal Items Sent to Safe: none PATIENT INSTRUCTIONS: 
 
 
F-face looks uneven A-arms unable to move or move unevenly S-speech slurred or non-existent T-time-call 911 as soon as signs and symptoms begin-DO NOT go Back to bed or wait to see if you get better-TIME IS BRAIN. Warning Signs of HEART ATTACK Call 911 if you have these symptoms: 
? Chest discomfort. Most heart attacks involve discomfort in the center of the chest that lasts more than a few minutes, or that goes away and comes back. It can feel like uncomfortable pressure, squeezing, fullness, or pain. ? Discomfort in other areas of the upper body. Symptoms can include pain or discomfort in one or both arms, the back, neck, jaw, or stomach. ? Shortness of breath with or without chest discomfort. ? Other signs may include breaking out in a cold sweat, nausea, or lightheadedness. Don't wait more than five minutes to call [address]! Fast action can save your life. Calling 911 is almost always the fastest way to get lifesaving treatment. Emergency Medical Services staff can begin treatment when they arrive  up to an hour sooner than if someone gets to the hospital by car. The discharge information has been reviewed with the patient. The patient verbalized understanding. Discharge medications reviewed with the patient and appropriate educational materials and side effects teaching were provided. Patient armband removed and shredded MyChart Activation Thank you for requesting access to Theatro. Please follow the instructions below to securely access and download your online medical record. Theatro allows you to send messages to your doctor, view your test results, renew your prescriptions, schedule appointments, and more. How Do I Sign Up? 1. In your internet browser, go to [email or web address] 
2. Click on the First Time User? Click Here link in the Sign In box. You will be redirect to the New Member Sign Up page. 3. Enter your Theatro Access Code exactly as it appears below. You will not need to use this code after youve completed the sign-up process. If you do not sign up before the expiration date, you must request a new code. Theatro Access Code: [email or web address] Expires: 2017  4:13 AM (This is the date your Theatro access code will ) 4. Enter the last four digits of your Social Security Number (xxxx) and Date of Birth (mm/dd/yyyy) as indicated and click Submit. You will be taken to the next sign-up page. 5. Create a Theatro ID. This will be your Theatro login ID and cannot be changed, so think of one that is secure and easy to remember. 6. Create a Theatro password. You can change your password at any time. 7. Enter your Password Reset Question and Answer. This can be used at a later time if you forget your password. 8. Enter your e-mail address. You will receive e-mail notification when new information is available in [address] 9. Click Sign Up. You can now view and download portions of your medical record. 10. Click the Download Summary menu link to download a portable copy of your medical information. Additional Information If you have questions, please visit the Frequently Asked Questions section of the Textic website at [email or web address] Remember, Textic is NOT to be used for urgent needs. For medical emergencies, dial 911. Discharge Orders None Introducing [facility]! [employer] introduces Textic patient portal. Now you can access parts of your medical record, email your doctor's office, and request medication refills online. 1. In your internet browser, go to [email or web address] 2. Click on the First Time User? Click Here link in the Sign In box. You will see the New Member Sign Up page. 3. Enter your Textic Access Code exactly as it appears below. You will not need to use this code after youve completed the sign-up process. If you do not sign up before the expiration date, you must request a new code. · Textic Access Code: [email or web address] Expires: 5/27/2017  4:13 AM 
 
4. Enter the last four digits of your Social Security Number (xxxx) and Date of Birth (mm/dd/yyyy) as indicated and click Submit. You will be taken to the next sign-up page. 5. Create a Textic ID. This will be your Textic login ID and cannot be changed, so think of one that is secure and easy to remember. 6. Create a Textic password. You can change your password at any time. 7. Enter your Password Reset Question and Answer. This can be used at a later time if you forget your password. 8. Enter your e-mail address. You will receive e-mail notification when new information is available in [address] 9. Click Sign Up. You can now view and download portions of your medical record. 10. Click the Download Summary menu link to download a portable copy of your medical information. If you have questions, please visit the Frequently Asked Questions section of the [employer] website. Remember, [employer] is NOT to be used for urgent needs. For medical emergencies, dial 911. Now available from your iPhone and Android! General Information Please provide this summary of care documentation to your next provider. Patient Signature:  ____________________________________________________________ Date:  ____________________________________________________________  
  
Neita Hidden Provider Signature:  ____________________________________________________________ Date:  ____________________________________________________________

## 2017-03-10 NOTE — IP AVS SNAPSHOT
Current Discharge Medication List  
  
Take these medications at their scheduled times Dose & Instructions Dispensing Information Comments Morning Noon Evening Bedtime ALPRAZolam 1 mg tablet Commonly known as:  Dane Paulson Your next dose is: Today, Tomorrow Other:  ____________ Dose:  1 mg Take 1 Tab by mouth two (2) times a day. Max Daily Amount: 2 mg. Quantity:  30 Tab Refills:  0  
     
   
   
   
  
 amLODIPine 10 mg tablet Commonly known as:  Codie Card Your next dose is: Today, Tomorrow Other:  ____________ Dose:  10 mg Take 1 Tab by mouth daily. Quantity:  30 Tab Refills:  1  
     
   
   
   
  
 arformoterol 15 mcg/2 mL Nebu neb solution Commonly known as:  Ankita Dieudonne Your next dose is: Today, Tomorrow Other:  ____________ Dose:  15 mcg  
2 mL by Nebulization route two (2) times a day. Quantity:  60 Vial  
Refills:  1  
     
   
   
   
  
 budesonide 0.5 mg/2 mL Nbsp Commonly known as:  PULMICORT Your next dose is: Today, Tomorrow Other:  ____________ Dose:  500 mcg 2 mL by Nebulization route two (2) times a day. Quantity:  60 Each Refills:  1  
     
   
   
   
  
 budesonide-formoterol 160-4.5 mcg/actuation HFA inhaler Commonly known as:  SYMBICORT Your next dose is: Today, Tomorrow Other:  ____________ Dose:  2 Puff Take 2 Puffs by inhalation two (2) times a day. Quantity:  1 Inhaler Refills:  1  
     
   
   
   
  
 bumetanide 0.5 mg tablet Commonly known as:  Jodiribeamanda Wade Your next dose is: Today, Tomorrow Other:  ____________ Dose:  0.5 mg Take 1 Tab by mouth every fourty-eight (48) hours. Quantity:  30 Tab Refills:  1  
     
   
   
   
  
 famotidine 20 mg tablet Commonly known as:  PEPCID Your next dose is: Today, Tomorrow Other:  ____________  Dose:  20 mg  
 Take 1 Tab by mouth daily. Quantity:  30 Tab Refills:  1 FLUoxetine 20 mg capsule Commonly known as:  PROzac Your next dose is: Today, Tomorrow Other:  ____________ Dose:  60 mg Take 3 Caps by mouth daily. Quantity:  90 Cap Refills:  1  
     
   
   
   
  
 hydrALAZINE 50 mg tablet Commonly known as:  APRESOLINE Your next dose is: Today, Tomorrow Other:  ____________ Dose:  50 mg Take 1 Tab by mouth three (3) times daily. Quantity:  90 Tab Refills:  1  
     
   
   
   
  
 levoFLOXacin 500 mg tablet Commonly known as:  Tamara Catalina Start taking on:  3/13/2017 Your next dose is: Today, Tomorrow Other:  ____________ Dose:  500 mg Take 1 Tab by mouth every twenty-four (24) hours. Quantity:  5 Tab Refills:  0  
     
   
   
   
  
 montelukast 10 mg tablet Commonly known as:  SINGULAIR Your next dose is: Today, Tomorrow Other:  ____________ Dose:  10 mg Take 1 Tab by mouth nightly. Quantity:  30 Tab Refills:  1  
     
   
   
   
  
 tiotropium 18 mcg inhalation capsule Commonly known as:  Harwick Brice Your next dose is: Today, Tomorrow Other:  ____________ Dose:  1 Cap Take 1 Cap by inhalation daily. Quantity:  30 Cap Refills:  1 Take these medications as needed Dose & Instructions Dispensing Information Comments Morning Noon Evening Bedtime * albuterol 90 mcg/actuation inhaler Commonly known as:  VENTOLIN HFA Your next dose is: Today, Tomorrow Other:  ____________ Dose:  2 Puff Take 2 Puffs by inhalation every six (6) hours as needed for Wheezing or Shortness of Breath. Quantity:  1 Inhaler Refills:  1  
     
   
   
   
  
 * albuterol 2.5 mg /3 mL (0.083 %) nebulizer solution Commonly known as:  PROVENTIL VENTOLIN  
   
 Your next dose is: Today, Tomorrow Other:  ____________ Dose:  2.5 mg  
3 mL by Nebulization route every four (4) hours as needed for Wheezing or Shortness of Breath. Quantity:  100 Each Refills:  1  
     
   
   
   
  
 oxyCODONE-acetaminophen  mg per tablet Commonly known as:  PERCOCET 10 Your next dose is: Today, Tomorrow Other:  ____________ Dose:  1 Tab Take 1 Tab by mouth every eight (8) hours as needed. Max Daily Amount: 3 Tabs. Quantity:  10 Tab Refills:  0  
     
   
   
   
  
 * Notice: This list has 2 medication(s) that are the same as other medications prescribed for you. Read the directions carefully, and ask your doctor or other care provider to review them with you. Take these medications as directed Dose & Instructions Dispensing Information Comments Morning Noon Evening Bedtime * OTHER Your next dose is: Today, Tomorrow Other:  ____________ Check CBC, CMP, Mg in 5 days, results to PCP immediately, Diagnosis- COPD Quantity:  1 Each Refills:  0  
     
   
   
   
  
 * OTHER Your next dose is: Today, Tomorrow Other:  ____________ Resume Home Oxygen Quantity:  1 Each Refills:  0  
     
   
   
   
  
 * OTHER Your next dose is: Today, Tomorrow Other:  ____________ Resume CPAP QHS Quantity:  1 Each Refills:  0  
     
   
   
   
  
 predniSONE 10 mg tablet Commonly known as:  Theresa Duane Your next dose is: Today, Tomorrow Other:  ____________  
   
   
 4 tabs ( 40 mg ) po daily for 3 days, then 2 tabs ( 20 mg ) po daily for 3 days, then 1 tab ( 10 mg ) po daily for 3 days, then stop Quantity:  21 Tab Refills:  0  
     
   
   
   
  
 * Notice: This list has 3 medication(s) that are the same as other medications prescribed for you.  Read the directions carefully, and ask your doctor or other care provider to review them with you. Where to Get Your Medications Information about where to get these medications is not yet available ! Ask your nurse or doctor about these medications  
  albuterol 2.5 mg /3 mL (0.083 %) nebulizer solution  
 levoFLOXacin 500 mg tablet OTHER  
 OTHER  
 OTHER  
 oxyCODONE-acetaminophen  mg per tablet  
 predniSONE 10 mg tablet

## 2017-03-10 NOTE — ROUTINE PROCESS
TRANSFER - OUT REPORT:    Verbal report given to Tani Proctor RN on Roda Kanner  being transferred to [address] [place] [address] for routine progression of care       Report consisted of patients Situation, Background, Assessment and   Recommendations(SBAR). Information from the following report(s) SBAR was reviewed with the receiving nurse. Lines:   Peripheral IV 03/10/17 Right Antecubital (Active)   Site Assessment Clean, dry, & intact 3/10/2017  4:01 AM   Phlebitis Assessment 0 3/10/2017  4:01 AM   Infiltration Assessment 0 3/10/2017  4:01 AM   Dressing Status Clean, dry, & intact 3/10/2017  4:01 AM   Dressing Type Tape;Topical skin adhesive;Transparent 3/10/2017  4:01 AM   Hub Color/Line Status Pink;Flushed;Patent; Positional 3/10/2017  4:01 AM   Action Taken Blood drawn 3/10/2017  4:01 AM       Peripheral IV 03/10/17 Left Hand (Active)   Site Assessment Clean, dry, & intact 3/10/2017  2:50 PM   Dressing Status Clean, dry, & intact 3/10/2017  2:50 PM        Opportunity for questions and clarification was provided.       Patient transported with:   [facility]

## 2017-03-10 NOTE — ED NOTES
Pt aox4. Pt c/o bilateral rib discomfort. Pt denies chest pain at this time. Pt sleeping. Will continue to monitor.

## 2017-03-10 NOTE — DISCHARGE SUMMARY
[address]  [address] [place] SUMMARY    Name:  Nickie Purdy  MR#:  [id number]  :  1965  Account #:  [de-identified]  Date of Adm:  2017  Date of Discharge:  2017      DISCHARGE DIAGNOSES:  1. Acute hypoxic and hypercarbic respiratory failure due to asthma  exacerbation with chronic bronchitis. 2. Chronic pain with drug-seeking behavior. 3. Chronic hypoxic respiratory failure on 2 liters per minute around the  clock. 4. Hypertension. 5. Morbid obesity. SERVICE: The patient was admitted to the hospitalist service. CONSULTS: Dr. Rose Fonseca was consulted for pulmonology. PROCEDURES: None. HISTORY OF PRESENT ILLNESS: Please refer to the admission H  and P..  Briefly, the patient is a 59-year-old female with a history significant for  the above, who came to the emergency department with wheezing and  shortness of breath. She had very recently discharged from our  service, returned complaining of shortness of breath. She went to the  emergency department, left against medical advice after requesting  pain medications and being offered Tylenol. She came back with  shortness of breath. She was noted to be hypoxic with ambulation and  our service was consulted again. When we saw her, vital signs were stable and normal. She appeared  to be in no distress, she had a regular heart, audible wheezing without  auscultation diffusely in both lung fields inspiration and expectorate. She was able to speak in full sentences and had no accessory muscle  use. Benign abdomen. Urine drug screen positive for opiates, ABG showed a pH of 7.33,  pCO2 46.4, pO2 of 69%. Chest x-ray on admission was negative for  acute. The patient was admitted to our service. HOSPITAL COURSE BY PROBLEM:  1.  Acute hypoxic hypercarbic respiratory failure due to acute asthma  exacerbation, chronic bronchitis: Maintained on IV steroids, Brovana,  Pulmicort, Singular, scheduled nebulizers and Mucomyst. Her asthma  is classified as severe and persistent. She has considerable  compliance issues. She had a CT scan recently showing findings  consistent with chronic bronchitis. She is improved over the course of  her hospital stay, she has been ambulating off the floor on 2 liters and  has been saturating in the high 90s. She has been noncompliant with  oxygen, frequently removed her oxygen. She has been observed to be  in no distress until she is examined. With the last hospitalization when I  saw her, the patient seemed to have mostly audible wheezing, from  her neck. While there is concern about drug-seeking behavior, possible  malingering, there is no question about her chronic medical issues. When I discussed discharge plan and medications, she reported that  she did not fill any of her medicines at home. Case was discussed with  Pulmonology. While I was discussing with Pulmonology, and in the  process of dictating this note, I received a page from nursing staff,  stating the patient was about to leave the floor stated that her ride  was here. I spent a considerable amount of time discussing the  medications with the patient, as she is at high risk for presenting again  to our service. 2. Chronic pain with a history of narcotic abuse and drug-seeking  behavior. I spoke with the patient in regard to not writing for narcotics. She expressed understanding and agreement. With the last  hospitalization they expressed concerns in terms of addiction potential.  I did write for anxiolytics, per pulmonology recommendations as there  is considerable anxiety component contributing to #1. 3. Chronic hypoxic respiratory failure: Instructed on 2 liters per minute  via nasal cannula around the clock. Again, she is not compliant with  this during multiple occasions in her hospital stay. When I saw her  today, again, she had no oxygen on and appeared to be comfortable. 4. Hypertension: We increased hydralazine.   5. Morbid obesity. Outpatient followup. Counseled on healthy eating  habits. On examination today, vital signs are stable and normal with the last  pressure 143/80. She was saturating in the mid to high 90s on room air. She appeared to be in no distress, had a regular heart, clear lungs,  benign abdomen. No calf tenderness or edema. She expressed that she would like to go home today. LABORATORY DATA: Today reviewed, CBC with a white count of  60.3, metabolic panel normal.    DISPOSITION: The patient will be discharged home. MEDICATIONS ON DISCHARGE: I have written for all new  prescriptions for the following as the patient states that she has not  filled any of them. 1. Brovana 50 mcg nebulizer b.i.d.. 2. Pulmicort 0.5 mg nebulizer b.i.d.. 3. Hydralazine 50 mg p.o. t.i.d.. 4. Albuterol 2 puffs every 6 hours as needed for wheezing or shortness  of breath by MDI. 5. Albuterol nebulizers every 4 hours as needed for wheezing. 6. Xanax 1 mg p.o. b.i.d., #30, no refills. 7. Norvasc 10 mg daily. 8. Symbicort 160/4.5 mcg 2 puffs b.i.d.. 9. Prednisone 20 mg take 3 tabs daily for 4 days, then 2 tabs daily  for 4 days, then 1 tab daily for 4 days and stop. 10. Bumex 0.5 mg p.o. every other day. 11. Pepcid 20 mg p.o. daily. 12. Prilosec 20 mg. 3 caps daily. 13. Singulair 10 mg at bedtime. 14. Spiriva 1 cap daily. Discontinue doxycycline 100 mg. 15. Lisinopril 20 mg. 16. Percocet 10/325 mg. 17. Radha-Colace 8.6/50 mg. 18. VESIcare 5 mg. FOLLOWUP: Will arrange for outpatient followup in 7-10 days. Pulmonology in 2-3 weeks. Total time of discharge greater than 30 minutes.         Agnes Hernandez MD PP / Thalia Hussein  D:  03/09/2017   10:17  T:  03/09/2017   21:42  Job #:  [id number]

## 2017-03-10 NOTE — Clinical Note
Status[de-identified] Inpatient [101] Type of Bed: Medical [8] Inpatient Hospitalization Certified Necessary for the Following Reasons: 3. Patient receiving treatment that can only be provided in an inpatient setting (further clarification in H&P documentation) Admitting Diagnosis: COPD exacerbation ([facility] [address] [[id number]] Admitting Physician: Tigre Monaco Attending Physician: Tigre Monaco Estimated Length of Stay: 3-4 Midnights Discharge Plan[de-identified] Home with Office Follow-up

## 2017-03-10 NOTE — ED NOTES
Pt. Resting quietly on left side with eyes closed; NAD observed. Siderail up; Urinal and CBWR. Family at the bedside.

## 2017-03-10 NOTE — PROGRESS NOTES
conducted a Emergency Room visit and Spiritual Assessment for Alcira Charlton, who is a 45 y.o.,female. The  provided the following Interventions:  Continued the relationship of care and support. Listened empathically as patient shared her difficulties in locating a place to live. She does not have any family in this area. Offered prayer and assurance of continued prayer on patients behalf. Chart reviewed. The following outcomes were achieved:  Patient shared that she is trying to locate a place of her own. Patient expressed gratitude for 's visit. Plan:  Chaplains will continue to follow and will provide pastoral care on an as needed/requested basis.  recommends bedside caregivers page  on duty if patient shows signs of acute spiritual or emotional distress.        Randa Luther  [address] [place] [address]  [phone]

## 2017-03-10 NOTE — PROGRESS NOTES
Pt c/o cough and pleuritic pain  RS: diff wheezing  Labs, chart and vitals noted  D/w pul Dr. Denis Bustillos, change Abx to Levaquin  QTc 420.   Start Narco and tylenol for pain

## 2017-03-10 NOTE — H&P
History and Physical    Patient: Magi Wyman MRN: [id number]  SSN: [id number]    YOB: 1965  Age: 45 y.o. Sex: female      Subjective:      Magi Wyman is a 45 y.o. female with PMH of Asthma/COPD , Morbid obesity , HTN being admitted for Acute dyspnea /COPD Excerebration     Patient's input in history is poor , ED notes reviewed     Patient was brought to ED after complaining of respiratory distress and chest pains with coughing . She was recently discharged from hospital. According to patient she is feeling worse since discharge. She has developed coughing with sputum , increasing wheezing and SOB . Denies fever Chills and rigors , Chest pains with coughing     Full CODE   DVT prophylaxis - heparin       Past Medical History:   Diagnosis Date    Asthma     Chronic obstructive pulmonary disease (HCC)     Endocrine disease     thyroid issues    Gastrointestinal disorder     \"blockage in my stomach\"    Hypertension      No past surgical history on file. No family history on file. Social History   Substance Use Topics    Smoking status: Never Smoker    Smokeless tobacco: Never Used    Alcohol use No      Comment: socially      Prior to Admission medications    Medication Sig Start Date End Date Taking? Authorizing Provider   albuterol (VENTOLIN HFA) 90 mcg/actuation inhaler Take 2 Puffs by inhalation every six (6) hours as needed for Wheezing or Shortness of Breath. 3/9/17   Jose Juan Rodriguez MD   ALPRAZolam [facility]) 1 mg tablet Take 1 Tab by mouth two (2) times a day. Max Daily Amount: 2 mg. 3/9/17   Jose Juan Rodriguez MD   amLODIPine (NORVASC) 10 mg tablet Take 1 Tab by mouth daily. 3/9/17   Jose Juan Rodriguez MD   budesonide-formoterol [facility]) 160-4.5 mcg/actuation HFA inhaler Take 2 Puffs by inhalation two (2) times a day. 3/9/17   Jose Juan Rodriguez MD   bumetanide (BUMEX) 0.5 mg tablet Take 1 Tab by mouth every fourty-eight (48) hours.  3/9/17   Jose Juan Rodriguez MD   famotidine (PEPCID) 20 mg tablet Take 1 Tab by mouth daily. 3/9/17   Mary Hope MD   FLUoxetine (PROZAC) 20 mg capsule Take 3 Caps by mouth daily. 3/9/17   Mary Hope MD   montelukast (SINGULAIR) 10 mg tablet Take 1 Tab by mouth nightly. 3/9/17   Mary Hope MD   predniSONE (DELTASONE) 20 mg tablet Take 3 tabs by mouth daily for 4 days, then 2 tabs daily for 4 days, then 1 tab daily for 4 days, then STOP. 3/9/17   Mary Hope MD   tiotropium [facility]) 18 mcg inhalation capsule Take 1 Cap by inhalation daily. 3/9/17   Mary Hope MD   albuterol (PROVENTIL VENTOLIN) 2.5 mg /3 mL (0.083 %) nebulizer solution 3 mL by Nebulization route every four (4) hours as needed for Wheezing. 3/9/17   Mary Hope MD   arformoterol (BROVANA) 15 mcg/2 mL nebu neb solution 2 mL by Nebulization route two (2) times a day. 3/9/17   Mary Hope MD   budesonide (PULMICORT) 0.5 mg/2 mL nbsp 2 mL by Nebulization route two (2) times a day. 3/9/17   Mary Hope MD   hydrALAZINE (APRESOLINE) 50 mg tablet Take 1 Tab by mouth three (3) times daily. 3/9/17   Mary Hope MD        No Known Allergies    Review of Systems:  A comprehensive review of systems was negative except for that written in the History of Present Illness.     Objective:     Vitals:    03/10/17 0515 03/10/17 0530 03/10/17 0545 03/10/17 0552   BP: (!) 201/74 177/75 200/82    Pulse: 80 82 81    Resp: 17 18 17    Temp:       SpO2: 96% 96% 96% 96%   Weight:       Height:            Physical Exam:  General appearance - alert, well appearing, and in no distress, oriented to person, place, and time and overweight  Mental status - normal mood, behavior, speech, dress, motor activity, and thought processes, sleepy   Eyes - pupils equal and reactive, extraocular eye movements intact  Ears - bilateral TM's and external ear canals normal  Nose - normal and patent, no erythema, discharge or polyps  Mouth - mucous membranes moist, pharynx normal without lesions  Chest - wheezing noted bilateral   Heart - normal rate, regular rhythm, normal S1, S2, no murmurs, rubs, clicks or gallops  Abdomen - soft, nontender, nondistended, no masses or organomegaly  Neurological - alert, oriented, normal speech, no focal findings or movement disorder noted  Musculoskeletal - no joint tenderness, deformity or swelling  Extremities - peripheral pulses normal, no pedal edema, no clubbing or cyanosis  Skin - normal coloration and turgor, no rashes, no suspicious skin lesions noted     Assessment:     Hospital Problems  Date Reviewed: 3/10/2017          Codes Class Noted POA    Essential hypertension ICD-10-CM: I10  ICD-9-CM: 401.9  3/10/2017 Unknown        Morbid obesity due to excess calories ([facility] ICD-10-CM: E66.01  ICD-9-CM: 278.01  3/10/2017 Unknown        Chest pain on breathing ICD-10-CM: R07.1  ICD-9-CM: 786.52  3/10/2017 Unknown        COPD with acute exacerbation (HCC) ICD-10-CM: J44.1  ICD-9-CM: 491.21  3/2/2017 Unknown        COPD exacerbation ([facility] ICD-10-CM: J44.1  ICD-9-CM: 491.21  3/12/2016 Unknown              CBC:  Lab Results   Component Value Date/Time    WBC 21.3 03/10/2017 04:00 AM    HGB 15.1 03/10/2017 04:00 AM    HCT 46.0 03/10/2017 04:00 AM    PLATELET 156 70/74/2379 04:00 AM    MCV 88.0 03/10/2017 04:00 AM        CMP:  Lab Results   Component Value Date/Time    Sodium 135 03/10/2017 04:00 AM    Potassium 3.8 03/10/2017 04:00 AM    Chloride 98 03/10/2017 04:00 AM    CO2 28 03/10/2017 04:00 AM    Anion gap 9 03/10/2017 04:00 AM    Glucose 149 03/10/2017 04:00 AM    BUN 34 03/10/2017 04:00 AM    Creatinine 1.10 03/10/2017 04:00 AM    BUN/Creatinine ratio 31 03/10/2017 04:00 AM    GFR est AA >60 03/10/2017 04:00 AM    GFR est non-AA 52 03/10/2017 04:00 AM    Calcium 7.9 03/10/2017 04:00 AM    AST (SGOT) 17 03/10/2017 04:00 AM    Alk.  phosphatase 91 03/10/2017 04:00 AM    Protein, total 6.5 03/10/2017 04:00 AM    Albumin 3.2 03/10/2017 04:00 AM    Globulin 3.3 03/10/2017 04:00 AM    A-G Ratio 1.0 03/10/2017 04:00 AM    ALT (SGPT) 50 03/10/2017 04:00 AM        PT/INR  Lab Results   Component Value Date/Time    INR 1.0 01/27/2017 04:29 AM    INR 0.9 01/11/2017 05:13 PM    INR 1.0 11/23/2016 03:21 PM    Prothrombin time 12.8 01/27/2017 04:29 AM    Prothrombin time 12.3 01/11/2017 05:13 PM    Prothrombin time 12.5 11/23/2016 03:21 PM            EKG: No results found for this or any previous visit. Chest Xray - no infiltrate - per ED MD   Official report pending      Plan:   COPD Acute   - Recently admitted for SOB   - Currently audible wheezing and in Mild distress but sats are maintained on 2L NC o2   - Does not speak much due to distress   - BD   - O2 - Follow ABG   - Steroids   - recently seen by Pulmonary in hospital   - Her chest xray official report pending , leucocytosis - mos tlikely from Steroids , however yesterday was 13.7 and today 21.3 , no H/o Fever chills .  Productive cough - present   - Doxycycline     Chest pains   - Increases with coughing   - initial trop negative   - No Acute ECG changes    HTN  - Uncontrolled   - Restart her home meds     Obesity   - Weight reduction and Lifestyle modification to loose weight       Signed By: Cleveland Oscar MD     March 10, 2017

## 2017-03-11 LAB
ANION GAP BLD CALC-SCNC: 9 MMOL/L (ref 3–18)
ATRIAL RATE: 84 BPM
BASOPHILS # BLD AUTO: 0 K/UL (ref 0–0.06)
BASOPHILS # BLD: 0 % (ref 0–2)
BUN SERPL-MCNC: 24 MG/DL (ref 7–18)
BUN/CREAT SERPL: 27 (ref 12–20)
CALCIUM SERPL-MCNC: 8.1 MG/DL (ref 8.5–10.1)
CALCULATED P AXIS, ECG09: 73 DEGREES
CALCULATED R AXIS, ECG10: 49 DEGREES
CALCULATED T AXIS, ECG11: 37 DEGREES
CHLORIDE SERPL-SCNC: 102 MMOL/L (ref 100–108)
CO2 SERPL-SCNC: 26 MMOL/L (ref 21–32)
CREAT SERPL-MCNC: 0.89 MG/DL (ref 0.6–1.3)
DIAGNOSIS, 93000: NORMAL
DIFFERENTIAL METHOD BLD: ABNORMAL
EOSINOPHIL # BLD: 0 K/UL (ref 0–0.4)
EOSINOPHIL NFR BLD: 0 % (ref 0–5)
ERYTHROCYTE [DISTWIDTH] IN BLOOD BY AUTOMATED COUNT: 13.3 % (ref 11.6–14.5)
GLUCOSE BLD STRIP.AUTO-MCNC: 130 MG/DL (ref 70–110)
GLUCOSE BLD STRIP.AUTO-MCNC: 136 MG/DL (ref 70–110)
GLUCOSE BLD STRIP.AUTO-MCNC: 140 MG/DL (ref 70–110)
GLUCOSE BLD STRIP.AUTO-MCNC: 154 MG/DL (ref 70–110)
GLUCOSE SERPL-MCNC: 155 MG/DL (ref 74–99)
HCT VFR BLD AUTO: 42.4 % (ref 35–45)
HGB BLD-MCNC: 13.6 G/DL (ref 12–16)
LYMPHOCYTES # BLD AUTO: 5 % (ref 21–52)
LYMPHOCYTES # BLD: 0.6 K/UL (ref 0.9–3.6)
MCH RBC QN AUTO: 28 PG (ref 24–34)
MCHC RBC AUTO-ENTMCNC: 32.1 G/DL (ref 31–37)
MCV RBC AUTO: 87.2 FL (ref 74–97)
MONOCYTES # BLD: 0.4 K/UL (ref 0.05–1.2)
MONOCYTES NFR BLD AUTO: 3 % (ref 3–10)
NEUTS SEG # BLD: 11.8 K/UL (ref 1.8–8)
NEUTS SEG NFR BLD AUTO: 92 % (ref 40–73)
P-R INTERVAL, ECG05: 114 MS
PLATELET # BLD AUTO: 246 K/UL (ref 135–420)
PMV BLD AUTO: 10.8 FL (ref 9.2–11.8)
POTASSIUM SERPL-SCNC: 4.3 MMOL/L (ref 3.5–5.5)
Q-T INTERVAL, ECG07: 356 MS
QRS DURATION, ECG06: 78 MS
QTC CALCULATION (BEZET), ECG08: 420 MS
RBC # BLD AUTO: 4.86 M/UL (ref 4.2–5.3)
SODIUM SERPL-SCNC: 137 MMOL/L (ref 136–145)
VENTRICULAR RATE, ECG03: 84 BPM
WBC # BLD AUTO: 12.8 K/UL (ref 4.6–13.2)

## 2017-03-11 PROCEDURE — 77010033678 HC OXYGEN DAILY

## 2017-03-11 PROCEDURE — 94640 AIRWAY INHALATION TREATMENT: CPT

## 2017-03-11 PROCEDURE — 65270000029 HC RM PRIVATE

## 2017-03-11 PROCEDURE — 74011000250 HC RX REV CODE- 250: Performed by: INTERNAL MEDICINE

## 2017-03-11 PROCEDURE — 82962 GLUCOSE BLOOD TEST: CPT

## 2017-03-11 PROCEDURE — 74011250637 HC RX REV CODE- 250/637: Performed by: EMERGENCY MEDICINE

## 2017-03-11 PROCEDURE — 80048 BASIC METABOLIC PNL TOTAL CA: CPT | Performed by: INTERNAL MEDICINE

## 2017-03-11 PROCEDURE — 85025 COMPLETE CBC W/AUTO DIFF WBC: CPT | Performed by: INTERNAL MEDICINE

## 2017-03-11 PROCEDURE — 74011250637 HC RX REV CODE- 250/637: Performed by: INTERNAL MEDICINE

## 2017-03-11 PROCEDURE — 74011250636 HC RX REV CODE- 250/636: Performed by: INTERNAL MEDICINE

## 2017-03-11 PROCEDURE — 36415 COLL VENOUS BLD VENIPUNCTURE: CPT | Performed by: INTERNAL MEDICINE

## 2017-03-11 PROCEDURE — 74011250636 HC RX REV CODE- 250/636: Performed by: HOSPITALIST

## 2017-03-11 PROCEDURE — 74011250637 HC RX REV CODE- 250/637: Performed by: HOSPITALIST

## 2017-03-11 RX ORDER — ARFORMOTEROL TARTRATE 15 UG/2ML
15 SOLUTION RESPIRATORY (INHALATION)
Status: DISCONTINUED | OUTPATIENT
Start: 2017-03-11 | End: 2017-03-12 | Stop reason: HOSPADM

## 2017-03-11 RX ORDER — OXYCODONE AND ACETAMINOPHEN 10; 325 MG/1; MG/1
1 TABLET ORAL
Status: DISCONTINUED | OUTPATIENT
Start: 2017-03-11 | End: 2017-03-12 | Stop reason: HOSPADM

## 2017-03-11 RX ORDER — OXYCODONE AND ACETAMINOPHEN 7.5; 325 MG/1; MG/1
1 TABLET ORAL
Status: DISCONTINUED | OUTPATIENT
Start: 2017-03-11 | End: 2017-03-11

## 2017-03-11 RX ADMIN — FAMOTIDINE 20 MG: 20 TABLET ORAL at 08:25

## 2017-03-11 RX ADMIN — ALBUTEROL SULFATE 2.5 MG: 2.5 SOLUTION RESPIRATORY (INHALATION) at 21:01

## 2017-03-11 RX ADMIN — Medication 10 ML: at 22:17

## 2017-03-11 RX ADMIN — METHYLPREDNISOLONE SODIUM SUCCINATE 40 MG: 40 INJECTION, POWDER, FOR SOLUTION INTRAMUSCULAR; INTRAVENOUS at 14:02

## 2017-03-11 RX ADMIN — BUDESONIDE 500 MCG: 0.5 INHALANT RESPIRATORY (INHALATION) at 21:01

## 2017-03-11 RX ADMIN — OXYCODONE HYDROCHLORIDE AND ACETAMINOPHEN 1 TABLET: 7.5; 325 TABLET ORAL at 13:53

## 2017-03-11 RX ADMIN — LEVOFLOXACIN 750 MG: 5 INJECTION INTRAVENOUS at 11:00

## 2017-03-11 RX ADMIN — HYDRALAZINE HYDROCHLORIDE 50 MG: 50 TABLET, FILM COATED ORAL at 08:25

## 2017-03-11 RX ADMIN — ALBUTEROL SULFATE 2.5 MG: 2.5 SOLUTION RESPIRATORY (INHALATION) at 01:23

## 2017-03-11 RX ADMIN — FLUOXETINE 60 MG: 20 CAPSULE ORAL at 08:25

## 2017-03-11 RX ADMIN — METHYLPREDNISOLONE SODIUM SUCCINATE 40 MG: 40 INJECTION, POWDER, FOR SOLUTION INTRAMUSCULAR; INTRAVENOUS at 06:37

## 2017-03-11 RX ADMIN — Medication 10 ML: at 06:37

## 2017-03-11 RX ADMIN — HYDRALAZINE HYDROCHLORIDE 50 MG: 50 TABLET, FILM COATED ORAL at 22:17

## 2017-03-11 RX ADMIN — AMLODIPINE BESYLATE 10 MG: 10 TABLET ORAL at 08:25

## 2017-03-11 RX ADMIN — ARFORMOTEROL TARTRATE 15 MCG: 15 SOLUTION RESPIRATORY (INHALATION) at 09:30

## 2017-03-11 RX ADMIN — BUDESONIDE 500 MCG: 0.5 INHALANT RESPIRATORY (INHALATION) at 01:25

## 2017-03-11 RX ADMIN — BUDESONIDE 500 MCG: 0.5 INHALANT RESPIRATORY (INHALATION) at 07:52

## 2017-03-11 RX ADMIN — OXYCODONE HYDROCHLORIDE AND ACETAMINOPHEN 1 TABLET: 10; 325 TABLET ORAL at 18:47

## 2017-03-11 RX ADMIN — HYDRALAZINE HYDROCHLORIDE 50 MG: 50 TABLET, FILM COATED ORAL at 17:11

## 2017-03-11 RX ADMIN — ARFORMOTEROL TARTRATE 15 MCG: 15 SOLUTION RESPIRATORY (INHALATION) at 01:25

## 2017-03-11 RX ADMIN — ALBUTEROL SULFATE 2.5 MG: 2.5 SOLUTION RESPIRATORY (INHALATION) at 15:40

## 2017-03-11 RX ADMIN — OXYCODONE HYDROCHLORIDE AND ACETAMINOPHEN 1 TABLET: 5; 325 TABLET ORAL at 01:30

## 2017-03-11 RX ADMIN — ALPRAZOLAM 1 MG: 1 TABLET ORAL at 17:11

## 2017-03-11 RX ADMIN — OXYCODONE HYDROCHLORIDE AND ACETAMINOPHEN 1 TABLET: 5; 325 TABLET ORAL at 08:25

## 2017-03-11 RX ADMIN — ALPRAZOLAM 1 MG: 1 TABLET ORAL at 08:25

## 2017-03-11 RX ADMIN — INSULIN LISPRO 2 UNITS: 100 INJECTION, SOLUTION INTRAVENOUS; SUBCUTANEOUS at 22:16

## 2017-03-11 RX ADMIN — MONTELUKAST SODIUM 10 MG: 10 TABLET, FILM COATED ORAL at 22:16

## 2017-03-11 RX ADMIN — METHYLPREDNISOLONE SODIUM SUCCINATE 40 MG: 40 INJECTION, POWDER, FOR SOLUTION INTRAMUSCULAR; INTRAVENOUS at 22:16

## 2017-03-11 RX ADMIN — Medication 10 ML: at 15:10

## 2017-03-11 RX ADMIN — ALBUTEROL SULFATE 2.5 MG: 2.5 SOLUTION RESPIRATORY (INHALATION) at 07:52

## 2017-03-11 RX ADMIN — ARFORMOTEROL TARTRATE 15 MCG: 15 SOLUTION RESPIRATORY (INHALATION) at 21:02

## 2017-03-11 NOTE — PROGRESS NOTES
[facility]  Pulmonary, Critical Care, and Sleep Medicine    Name: Asmita Stearns MRN: [id number]   : 1965 Hospital: [facility]   Date: 3/11/2017  Room: [facility]     Subjective:     Patient seen and examined at bedside. Feels breathing is better today. Complains of cough. Denies pleuritic chest pain or orthopnea.       Past Medical History:   Diagnosis Date    Asthma     Chronic obstructive pulmonary disease ([address]     Endocrine disease     thyroid issues    Gastrointestinal disorder     \"blockage in my stomach\"    Hypertension        Current Facility-Administered Medications   Medication Dose Route Frequency    oxyCODONE-acetaminophen (PERCOCET 7.5) 7.5-325 mg per tablet 1 Tab  1 Tab Oral Q6H PRN    arformoterol (BROVANA) neb solution 15 mcg  15 mcg Nebulization BID RT    ALPRAZolam (XANAX) tablet 1 mg  1 mg Oral BID    amLODIPine (NORVASC) tablet 10 mg  10 mg Oral DAILY    bumetanide (BUMEX) tablet 0.5 mg  0.5 mg Oral Q48H    famotidine (PEPCID) tablet 20 mg  20 mg Oral DAILY    FLUoxetine (PROzac) capsule 60 mg  60 mg Oral DAILY    montelukast (SINGULAIR) tablet 10 mg  10 mg Oral QHS    tiotropium (SPIRIVA) inhalation capsule 18 mcg  1 Cap Inhalation DAILY    hydrALAZINE (APRESOLINE) tablet 50 mg  50 mg Oral TID    methylPREDNISolone (PF) (SOLU-MEDROL) injection 40 mg  40 mg IntraVENous Q8H    sodium chloride (NS) flush 5-10 mL  5-10 mL IntraVENous Q8H    sodium chloride (NS) flush 5-10 mL  5-10 mL IntraVENous PRN    heparin (porcine) injection 5,000 Units  5,000 Units SubCUTAneous Q8H    acetaminophen (TYLENOL) tablet 650 mg  650 mg Oral Q6H PRN    levoFLOXacin (LEVAQUIN) 750 mg in D5W IVPB  750 mg IntraVENous Q24H    insulin lispro (HUMALOG) injection   SubCUTAneous AC&HS    glucose chewable tablet 16 g  4 Tab Oral PRN    glucagon (GLUCAGEN) injection 1 mg  1 mg IntraMUSCular PRN    dextrose (D50W) injection syrg 12.5-25 g  25-50 mL IntraVENous PRN  budesonide (PULMICORT) 500 mcg/2 ml nebulizer suspension  500 mcg Nebulization BID RT    albuterol (PROVENTIL VENTOLIN) nebulizer solution 2.5 mg  2.5 mg Nebulization Q6H RT       No Known Allergies    Review of Systems:  Denies chest pain or headache    Objective:   Vital Signs:    Visit Vitals    /74 (BP Patient Position: Head of bed elevated (Comment degrees))    Pulse 98    Temp 96.9 °F (36.1 °C)    Resp 18    Ht 4' 11\" (1.499 m)    Wt 114.3 kg (252 lb)    SpO2 97%    Breastfeeding No    BMI 50.9 kg/m2       O2 Device: Nasal cannula   O2 Flow Rate (L/min): 2 l/min   Temp (24hrs), Av.7 °F (36.5 °C), Min:96.9 °F (36.1 °C), Max:98.2 °F (36.8 °C)       Intake/Output:   No intake or output data in the 24 hours ending 17 1452      Physical Exam:   General: comfortable, obese  Neck: No adenopathy or thyroid swelling  CVS: S1S2 no murmurs  RS: improved air entry noted b/l, no wheezing heard  Abd: soft, non tender, no hepatosplenomegaly  Neuro: non focal, awake, alert  Extrm: no leg edema   Skin: no rash    Data review:   Labs:  Recent Results (from the past 12 hour(s))   METABOLIC PANEL, BASIC    Collection Time: 17  4:14 AM   Result Value Ref Range    Sodium 137 136 - 145 mmol/L    Potassium 4.3 3.5 - 5.5 mmol/L    Chloride 102 100 - 108 mmol/L    CO2 26 21 - 32 mmol/L    Anion gap 9 3.0 - 18 mmol/L    Glucose 155 (H) 74 - 99 mg/dL    BUN 24 (H) 7.0 - 18 MG/DL    Creatinine 0.89 0.6 - 1.3 MG/DL    BUN/Creatinine ratio 27 (H) 12 - 20      GFR est AA >60 >60 ml/min/1.73m2    GFR est non-AA >60 >60 ml/min/1.73m2    Calcium 8.1 (L) 8.5 - 10.1 MG/DL   CBC WITH AUTOMATED DIFF    Collection Time: 17  4:14 AM   Result Value Ref Range    WBC 12.8 4.6 - 13.2 K/uL    RBC 4.86 4.20 - 5.30 M/uL    HGB 13.6 12.0 - 16.0 g/dL    HCT 42.4 35.0 - 45.0 %    MCV 87.2 74.0 - 97.0 FL    MCH 28.0 24.0 - 34.0 PG    MCHC 32.1 31.0 - 37.0 g/dL    RDW 13.3 11.6 - 14.5 %    PLATELET 259 668 - 613 K/uL MPV 10.8 9.2 - 11.8 FL    NEUTROPHILS 92 (H) 40 - 73 %    LYMPHOCYTES 5 (L) 21 - 52 %    MONOCYTES 3 3 - 10 %    EOSINOPHILS 0 0 - 5 %    BASOPHILS 0 0 - 2 %    ABS. NEUTROPHILS 11.8 (H) 1.8 - 8.0 K/UL    ABS. LYMPHOCYTES 0.6 (L) 0.9 - 3.6 K/UL    ABS. MONOCYTES 0.4 0.05 - 1.2 K/UL    ABS. EOSINOPHILS 0.0 0.0 - 0.4 K/UL    ABS. BASOPHILS 0.0 0.0 - 0.06 K/UL    DF AUTOMATED     GLUCOSE, POC    Collection Time: 03/11/17  8:16 AM   Result Value Ref Range    Glucose (POC) 140 (H) 70 - 110 mg/dL   GLUCOSE, POC    Collection Time: 03/11/17 12:30 PM   Result Value Ref Range    Glucose (POC) 136 (H) 70 - 110 mg/dL       Imaging:  I have personally reviewed the patients radiographs and have reviewed the reports:  CxR 3/10/2017 : Pulmonary vascular congestion noted, no acute infiltrates     IMPRESSION:   · Acute exacerbation of Severe asthma / COPD  · MY-non compliant with PAP therapy  · Obesity      RECOMMENDATIONS:   · Continue IV steroids, BD, levaquin. ? If patient has VCD - will need to follow up in pulmonary clinic. · Wean O2 as tolerated to maintain sats > 90%. Patient encouraged to have someone bring home CPAP and use it while in the hospital.   · Judicious use of narcotics/benzos. · DVT prophylaxis.         Jennifer Cueto MD

## 2017-03-11 NOTE — PROGRESS NOTES
Bedside and Verbal shift change report given to Elena Raygoza RN(oncoming nurse) by Vanita Alvarez LPN (offgoing nurse). Report included the following information SBAR, Kardex, MAR and Recent Results.     SITUATION:    Code Status: Full Code   Reason for Admission: COPD exacerbation ([facility]   COPD with acute exacerbation ([facility]    [address] [facility] day: 1   Problem List:       Hospital Problems  Date Reviewed: 3/10/2017          Codes Class Noted POA    Essential hypertension ICD-10-CM: I10  ICD-9-CM: 401.9  3/10/2017 Unknown        Morbid obesity due to excess calories ([facility] ICD-10-CM: E66.01  ICD-9-CM: 278.01  3/10/2017 Unknown        Chest pain on breathing ICD-10-CM: R07.1  ICD-9-CM: 786.52  3/10/2017 Unknown        COPD with acute exacerbation ([facility] ICD-10-CM: J44.1  ICD-9-CM: 491.21  3/2/2017 Unknown        COPD exacerbation ([facility] ICD-10-CM: J44.1  ICD-9-CM: 491.21  3/12/2016 Unknown              BACKGROUND:    Past Medical History:   Past Medical History:   Diagnosis Date    Asthma     Chronic obstructive pulmonary disease ([facility]     Endocrine disease     thyroid issues    Gastrointestinal disorder     \"blockage in my stomach\"    Hypertension          Patient taking anticoagulants Refuses Heparin    ASSESSMENT:    Changes in Assessment Throughout Shift: none     Patient has Central Line: no    Patient has Walker Cath: no      Last Vitals:     Vitals:    03/11/17 0127 03/11/17 0416 03/11/17 0818 03/11/17 1233   BP: 137/65 151/74 160/80 140/74   Pulse: 93 96 86 98   Resp: 20 21 18 18   Temp: 98.2 °F (36.8 °C) 97.8 °F (36.6 °C) 97.7 °F (36.5 °C) 96.9 °F (36.1 °C)   SpO2: 98% 98% 99% 97%   Weight:       Height:            IV and DRAINS (will only show if present)   Peripheral IV 03/10/17 Left Hand-Site Assessment: Clean, dry, & intact  [REMOVED] Peripheral IV 03/10/17 Right Antecubital-Site Assessment: Clean, dry, & intact     WOUND (if present)   Wound Type:  none   Dressing present Dressing Present : No   Wound Concerns/Notes:  none     PAIN    Pain Assessment    Pain Intensity 1: 10 (03/11/17 1453)    Pain Location 1: Chest (pain from coughing)    Pain Intervention(s) 1: Medication (see MAR)    Patient Stated Pain Goal: 0  o Interventions for Pain:  Percocet  o Intervention effective: yes  o Time of last intervention: 1847  o Reassessment Completed: yes      Last 3 Weights:  Last 3 Recorded Weights in this Encounter    03/10/17 0352   Weight: 114.3 kg (252 lb)     Weight change:      INTAKE/OUPUT    Current Shift:      Last three shifts:       LAB RESULTS     Recent Labs      03/11/17   0414  03/10/17   0400  03/09/17   0305   WBC  12.8  21.3*  13.7*   HGB  13.6  15.1  14.1   HCT  42.4  46.0*  43.4   PLT  246  273  271        Recent Labs      03/11/17   0414  03/10/17   0400  03/09/17   0305   NA  137  135*  137   K  4.3  3.8  3.9   GLU  155*  149*  138*   BUN  24*  34*  26*   CREA  0.89  1.10  0.86   CA  8.1*  7.9*  7.9*   MG   --   2.7*   --        RECOMMENDATIONS AND DISCHARGE PLANNING     1. Pending tests/procedures/ Plan of Care or Other Needs: Need sputum for gram stain and culture     2. Discharge plan for patient and Needs/Barriers: Home    3. Estimated Discharge Date: 3/13/2017 Posted on Whiteboard in [facility] Room: yes      4. The patient's care plan was reviewed with the oncoming nurse. \"HEALS\" SAFETY CHECK      Fall Risk    Total Score: 4    Safety Measures: Safety Measures: Bed/Chair-Wheels locked, Bed in low position, Call light within reach, Fall prevention (comment), Gripper socks, Side rails X 3    A safety check occurred in the patient's room between off going nurse and oncoming nurse listed above.     The safety check included the below items  Area Items   H  High Alert Medications - Verify all high alert medication drips (heparin, PCA, etc.)   E  Equipment - Suction is set up for ALL patients (with yanker)  - Red plugs utilized for all equipment (IV pumps, etc.)  - WOWs wiped down at end of shift.  - Room stocked with oxygen, suction, and other unit-specific supplies   A  Alarms - Bed alarm is set for fall risk patients  - Ensure chair alarm is in place and activated if patient is up in a chair   L  Lines - Check IV for any infiltration  - Walker bag is empty if patient has a Walker   - Tubing and IV bags are labeled   S  Safety   - Room is clean, patient is clean, and equipment is clean. - Hallways are clear from equipment besides carts. - Fall bracelet on for fall risk patients  - Ensure room is clear and free of clutter  - Suction is set up for ALL patients (with yanker)  - Hallways are clear from equipment besides carts.    - Isolation precautions followed, supplies available outside room, sign posted     Tirso Herron LPN

## 2017-03-11 NOTE — PROGRESS NOTES
[facility]    Patient: Ulises Boyle Age: 45 y.o. : 1965 MR#: [id number] SSN: [id number]  Date/Time: 3/11/2017 11:47 AM    Subjective:     Patient sitting in a chair in NAD, awake, alert    Assessment/Plan:     1- Acute copd exacerbation  2- HTN  3- Obesity  4- Chronic respiratory failure, on Home O2 2 liters vis NC continuously    PLAN  O2, nebs, steroids, abx  Pulm following  On norvasc  dvt prophylaxis  D/w patient, d/w RN    Case discussed with:  [x]Patient  []Family  [x]Nursing  []Case Management  DVT Prophylaxis:  []Lovenox  []Hep SQ  []SCDs  []Coumadin   []On Heparin gtt    Objective:   VS:   Visit Vitals    /80 (BP 1 Location: Right arm, BP Patient Position: Head of bed elevated (Comment degrees))    Pulse 86    Temp 97.7 °F (36.5 °C)    Resp 18    Ht 4' 11\" (1.499 m)    Wt 114.3 kg (252 lb)    SpO2 99%    Breastfeeding No    BMI 50.9 kg/m2      Tmax/24hrs: Temp (24hrs), Av.8 °F (36.6 °C), Min:97.4 °F (36.3 °C), Max:98.2 °F (36.8 °C)  No intake or output data in the 24 hours ending 17 1147    General:  Awake, alert  Cardiovascular:  S1S2+, RRR  Pulmonary:  Some wheezing b/l  GI:  Soft, BS+, NT, ND  Extremities:  trace edema       Labs:    Recent Results (from the past 24 hour(s))   GLUCOSE, POC    Collection Time: 03/10/17  2:17 PM   Result Value Ref Range    Glucose (POC) 131 (H) 70 - 110 mg/dL   GLUCOSE, POC    Collection Time: 03/10/17  5:33 PM   Result Value Ref Range    Glucose (POC) 207 (H) 70 - 110 mg/dL   GLUCOSE, POC    Collection Time: 03/10/17 10:09 PM   Result Value Ref Range    Glucose (POC) 97 70 - 885 mg/dL   METABOLIC PANEL, BASIC    Collection Time: 17  4:14 AM   Result Value Ref Range    Sodium 137 136 - 145 mmol/L    Potassium 4.3 3.5 - 5.5 mmol/L    Chloride 102 100 - 108 mmol/L    CO2 26 21 - 32 mmol/L    Anion gap 9 3.0 - 18 mmol/L    Glucose 155 (H) 74 - 99 mg/dL    BUN 24 (H) 7.0 - 18 MG/DL    Creatinine 0.89 0.6 - 1.3 MG/DL    BUN/Creatinine ratio 27 (H) 12 - 20      GFR est AA >60 >60 ml/min/1.73m2    GFR est non-AA >60 >60 ml/min/1.73m2    Calcium 8.1 (L) 8.5 - 10.1 MG/DL   CBC WITH AUTOMATED DIFF    Collection Time: 03/11/17  4:14 AM   Result Value Ref Range    WBC 12.8 4.6 - 13.2 K/uL    RBC 4.86 4.20 - 5.30 M/uL    HGB 13.6 12.0 - 16.0 g/dL    HCT 42.4 35.0 - 45.0 %    MCV 87.2 74.0 - 97.0 FL    MCH 28.0 24.0 - 34.0 PG    MCHC 32.1 31.0 - 37.0 g/dL    RDW 13.3 11.6 - 14.5 %    PLATELET 401 973 - 300 K/uL    MPV 10.8 9.2 - 11.8 FL    NEUTROPHILS 92 (H) 40 - 73 %    LYMPHOCYTES 5 (L) 21 - 52 %    MONOCYTES 3 3 - 10 %    EOSINOPHILS 0 0 - 5 %    BASOPHILS 0 0 - 2 %    ABS. NEUTROPHILS 11.8 (H) 1.8 - 8.0 K/UL    ABS. LYMPHOCYTES 0.6 (L) 0.9 - 3.6 K/UL    ABS. MONOCYTES 0.4 0.05 - 1.2 K/UL    ABS. EOSINOPHILS 0.0 0.0 - 0.4 K/UL    ABS.  BASOPHILS 0.0 0.0 - 0.06 K/UL    DF AUTOMATED     GLUCOSE, POC    Collection Time: 03/11/17  8:16 AM   Result Value Ref Range    Glucose (POC) 140 (H) 70 - 110 mg/dL       Signed By: Sandra Pool MD     March 11, 2017 11:47 AM

## 2017-03-11 NOTE — PROGRESS NOTES
Care Management Interventions  PCP Verified by CM: Yes  Palliative Care Consult (Criteria: CHF and RRAT>21): No  Reason for No Palliative Care Consult: Other (see comment) (no order)  Mode of Transport at Discharge: BLS (pt reports that she will need assistance with transport when ready for discharge)  Transition of Care Consult (CM Consult): Discharge Planning  Current Support Network: Own Home, Lives Alone, Family Lives Nearby  Confirm Follow Up Transport: Self  Plan discussed with Pt/Family/Caregiver: Yes (pt will return home at discharge)  Discharge Location  Discharge Placement: Home with family assistance  This pt is frequent re-admit, and since her admission, has left the room repeatedly roaming the halls of the hospital or walking up and down hallways looking in other patients room. Pt has been non-compliant since admission with rules and guidelines that promote healing. Pt refuses to follow-up with outpatient appointments preferring to return to this hospital for admission.

## 2017-03-11 NOTE — ROUTINE PROCESS
Bedside and Verbal shift change report given to Althea Rivera (oncoming nurse) by Damaris Rao RN(offgoing nurse). Report included the following information SBAR, Kardex, ED Summary, Intake/Output, MAR and Recent Results.

## 2017-03-12 VITALS
SYSTOLIC BLOOD PRESSURE: 151 MMHG | TEMPERATURE: 97.5 F | HEART RATE: 88 BPM | RESPIRATION RATE: 18 BRPM | DIASTOLIC BLOOD PRESSURE: 63 MMHG | OXYGEN SATURATION: 98 % | BODY MASS INDEX: 50.8 KG/M2 | HEIGHT: 59 IN | WEIGHT: 252 LBS

## 2017-03-12 LAB
ANION GAP BLD CALC-SCNC: 7 MMOL/L (ref 3–18)
BASOPHILS # BLD AUTO: 0 K/UL (ref 0–0.06)
BASOPHILS # BLD: 0 % (ref 0–3)
BUN SERPL-MCNC: 24 MG/DL (ref 7–18)
BUN/CREAT SERPL: 30 (ref 12–20)
CALCIUM SERPL-MCNC: 8.1 MG/DL (ref 8.5–10.1)
CHLORIDE SERPL-SCNC: 102 MMOL/L (ref 100–108)
CO2 SERPL-SCNC: 26 MMOL/L (ref 21–32)
CREAT SERPL-MCNC: 0.81 MG/DL (ref 0.6–1.3)
DIFFERENTIAL METHOD BLD: ABNORMAL
EOSINOPHIL # BLD: 0 K/UL (ref 0–0.4)
EOSINOPHIL NFR BLD: 0 % (ref 0–5)
ERYTHROCYTE [DISTWIDTH] IN BLOOD BY AUTOMATED COUNT: 13.6 % (ref 11.6–14.5)
GLUCOSE BLD STRIP.AUTO-MCNC: 140 MG/DL (ref 70–110)
GLUCOSE BLD STRIP.AUTO-MCNC: 141 MG/DL (ref 70–110)
GLUCOSE SERPL-MCNC: 149 MG/DL (ref 74–99)
HCT VFR BLD AUTO: 42.2 % (ref 35–45)
HGB BLD-MCNC: 13.5 G/DL (ref 12–16)
LYMPHOCYTES # BLD AUTO: 2 % (ref 20–51)
LYMPHOCYTES # BLD: 0.3 K/UL (ref 0.8–3.5)
MCH RBC QN AUTO: 28.5 PG (ref 24–34)
MCHC RBC AUTO-ENTMCNC: 32 G/DL (ref 31–37)
MCV RBC AUTO: 89 FL (ref 74–97)
MONOCYTES # BLD: 0.3 K/UL (ref 0–1)
MONOCYTES NFR BLD AUTO: 2 % (ref 2–9)
NEUTS SEG # BLD: 12.9 K/UL (ref 1.8–8)
NEUTS SEG NFR BLD AUTO: 96 % (ref 42–75)
PLATELET # BLD AUTO: 221 K/UL (ref 135–420)
PLATELET COMMENTS,PCOM: ABNORMAL
PMV BLD AUTO: 10.8 FL (ref 9.2–11.8)
POTASSIUM SERPL-SCNC: 4.9 MMOL/L (ref 3.5–5.5)
RBC # BLD AUTO: 4.74 M/UL (ref 4.2–5.3)
RBC MORPH BLD: ABNORMAL
SODIUM SERPL-SCNC: 135 MMOL/L (ref 136–145)
WBC # BLD AUTO: 13.5 K/UL (ref 4.6–13.2)

## 2017-03-12 PROCEDURE — 74011250637 HC RX REV CODE- 250/637: Performed by: INTERNAL MEDICINE

## 2017-03-12 PROCEDURE — 80048 BASIC METABOLIC PNL TOTAL CA: CPT | Performed by: INTERNAL MEDICINE

## 2017-03-12 PROCEDURE — 74011000250 HC RX REV CODE- 250: Performed by: INTERNAL MEDICINE

## 2017-03-12 PROCEDURE — 87070 CULTURE OTHR SPECIMN AEROBIC: CPT | Performed by: INTERNAL MEDICINE

## 2017-03-12 PROCEDURE — 85025 COMPLETE CBC W/AUTO DIFF WBC: CPT | Performed by: INTERNAL MEDICINE

## 2017-03-12 PROCEDURE — 74011250637 HC RX REV CODE- 250/637: Performed by: EMERGENCY MEDICINE

## 2017-03-12 PROCEDURE — 74011250636 HC RX REV CODE- 250/636: Performed by: INTERNAL MEDICINE

## 2017-03-12 PROCEDURE — 36415 COLL VENOUS BLD VENIPUNCTURE: CPT | Performed by: INTERNAL MEDICINE

## 2017-03-12 PROCEDURE — 74011250636 HC RX REV CODE- 250/636: Performed by: HOSPITALIST

## 2017-03-12 PROCEDURE — 82962 GLUCOSE BLOOD TEST: CPT

## 2017-03-12 RX ORDER — LEVOFLOXACIN 500 MG/1
500 TABLET, FILM COATED ORAL EVERY 24 HOURS
Qty: 5 TAB | Refills: 0 | Status: SHIPPED | OUTPATIENT
Start: 2017-03-13 | End: 2017-04-18

## 2017-03-12 RX ORDER — OXYCODONE AND ACETAMINOPHEN 10; 325 MG/1; MG/1
1 TABLET ORAL
Qty: 10 TAB | Refills: 0 | Status: ON HOLD | OUTPATIENT
Start: 2017-03-12 | End: 2017-04-24

## 2017-03-12 RX ORDER — PREDNISONE 20 MG/1
40 TABLET ORAL
Status: DISCONTINUED | OUTPATIENT
Start: 2017-03-13 | End: 2017-03-12 | Stop reason: HOSPADM

## 2017-03-12 RX ORDER — LEVOFLOXACIN 500 MG/1
500 TABLET, FILM COATED ORAL EVERY 24 HOURS
Status: DISCONTINUED | OUTPATIENT
Start: 2017-03-13 | End: 2017-03-12 | Stop reason: HOSPADM

## 2017-03-12 RX ORDER — ALBUTEROL SULFATE 0.83 MG/ML
2.5 SOLUTION RESPIRATORY (INHALATION)
Qty: 100 EACH | Refills: 1 | Status: SHIPPED | OUTPATIENT
Start: 2017-03-12 | End: 2018-01-26

## 2017-03-12 RX ORDER — LEVOFLOXACIN 500 MG/1
500 TABLET, FILM COATED ORAL EVERY 24 HOURS
Qty: 5 TAB | Refills: 0 | Status: SHIPPED | OUTPATIENT
Start: 2017-03-13 | End: 2017-03-12

## 2017-03-12 RX ORDER — PREDNISONE 10 MG/1
TABLET ORAL
Qty: 21 TAB | Refills: 0 | Status: SHIPPED | OUTPATIENT
Start: 2017-03-12 | End: 2017-04-18

## 2017-03-12 RX ADMIN — ARFORMOTEROL TARTRATE 15 MCG: 15 SOLUTION RESPIRATORY (INHALATION) at 13:16

## 2017-03-12 RX ADMIN — ALBUTEROL SULFATE 2.5 MG: 2.5 SOLUTION RESPIRATORY (INHALATION) at 13:20

## 2017-03-12 RX ADMIN — ALPRAZOLAM 1 MG: 1 TABLET ORAL at 08:54

## 2017-03-12 RX ADMIN — LEVOFLOXACIN 750 MG: 5 INJECTION INTRAVENOUS at 10:26

## 2017-03-12 RX ADMIN — HYDRALAZINE HYDROCHLORIDE 50 MG: 50 TABLET, FILM COATED ORAL at 08:54

## 2017-03-12 RX ADMIN — FLUOXETINE 60 MG: 20 CAPSULE ORAL at 08:54

## 2017-03-12 RX ADMIN — Medication 10 ML: at 06:47

## 2017-03-12 RX ADMIN — BUMETANIDE 0.5 MG: 0.5 TABLET ORAL at 08:54

## 2017-03-12 RX ADMIN — AMLODIPINE BESYLATE 10 MG: 10 TABLET ORAL at 08:54

## 2017-03-12 RX ADMIN — FAMOTIDINE 20 MG: 20 TABLET ORAL at 08:54

## 2017-03-12 RX ADMIN — OXYCODONE HYDROCHLORIDE AND ACETAMINOPHEN 1 TABLET: 10; 325 TABLET ORAL at 03:33

## 2017-03-12 RX ADMIN — OXYCODONE HYDROCHLORIDE AND ACETAMINOPHEN 1 TABLET: 10; 325 TABLET ORAL at 10:26

## 2017-03-12 RX ADMIN — Medication 10 ML: at 13:17

## 2017-03-12 RX ADMIN — BUDESONIDE 500 MCG: 0.5 INHALANT RESPIRATORY (INHALATION) at 13:16

## 2017-03-12 RX ADMIN — TIOTROPIUM BROMIDE 18 MCG: 18 CAPSULE ORAL; RESPIRATORY (INHALATION) at 14:23

## 2017-03-12 RX ADMIN — METHYLPREDNISOLONE SODIUM SUCCINATE 40 MG: 40 INJECTION, POWDER, FOR SOLUTION INTRAMUSCULAR; INTRAVENOUS at 06:47

## 2017-03-12 NOTE — PROGRESS NOTES
conducted a Follow up consultation and Spiritual Assessment for Herman Up, who is a 45 y.o.,female. The  provided the following Interventions:  Continued the relationship of care and support. Listened empathically. Patient needed a sweater. Chart reviewed. The following outcomes were achieved:  Patient expressed gratitude for 's visit. Assessment:  There are no further spiritual or Worship issues which require Spiritual Care Services interventions at this time. Plan:  Chaplains will continue to follow and will provide pastoral care on an as needed/requested basis.  recommends bedside caregivers page  on duty if patient shows signs of acute spiritual or emotional distress.        [address]   [phone]

## 2017-03-12 NOTE — ROUTINE PROCESS
Respiratory Care Assessment for Bronchial hygiene or Lung Expansion Therapy  Patient  Andreas Rangel     45 y.o.   female     3/12/2017  4:41 AM  Patient Active Problem List   Diagnosis Code    Asthma exacerbation attacks J45. [address]    Status asthmaticus with COPD (chronic obstructive pulmonary disease) ([facility]) J44.9, J45.902    Abnormal CT of the chest R93.8    Shortness of breath R06.02    Asthma J45.909    COPD exacerbation ([facility]) J44.1    Acute on chronic respiratory failure with hypoxemia ([facility]) J96.21    COPD with exacerbation ([facility]) J44.1    Acute exacerbation of chronic obstructive pulmonary disease (COPD) ([facility]) J44.1    Respiratory failure ([facility]) J96.90    Acute encephalopathy G93.40    Asthma exacerbation J45. 901    Acute respiratory failure with hypercapnia ([facility]) J96.02    COPD with acute exacerbation ([facility] J44.1    Essential hypertension I10    Morbid obesity due to excess calories ([facility] E66.01    Chest pain on breathing R07.1       ABG:  Date:3/12/2017  No results found for: PH, PHI, PCO2, PCO2I, PO2, PO2I, HCO3, HCO3I, FIO2, FIO2I    Chest X-ray:  Date:3/12/2017    Results from Hospital Encounter encounter on 03/10/17   XR CHEST PORT   Narrative CHEST ONE VIEW portable 0430 hours    CPT CODE: 26254    HISTORY: Shortness of breath, wheezing, chest discomfort, nausea, diarrhea,  productive cough, onset prior to arrival.    COMPARISON: Chest x-ray 3/3, 3/2/2017. FINDINGS:     Single view obtained. The cardiac silhouette is top normal in size to minimally  enlarged. There is tortuosity of the aorta with calcified plaque. The lungs are  clear. Pulmonary vascularity is normal. The costophrenic angles are sharply  defined. No bony abnormalities are seen. Impression IMPRESSION:    Top normal cardiac size to minimal cardiomegaly. Atherosclerosis.         Lab Test:  Date:3/12/2017  WBC:   Lab Results   Component Value Date/Time    WBC 12.8 03/11/2017 04:14 AM   HGB: Lab Results   Component Value Date/Time    HGB 13.6 03/11/2017 04:14 AM    PLTS: Lab Results   Component Value Date/Time    PLATELET 299 41/00/8808 04:14 AM       SaO2%/flow:   SpO2 Readings from Last 1 Encounters:   03/12/17 96%       Vital Signs:   Patient Vitals for the past 8 hrs:   Temp Pulse Resp BP SpO2   03/12/17 0109 97.4 °F (36.3 °C) 76 20 132/64 96 %   03/11/17 2312 97.6 °F (36.4 °C) 98 20 105/54 96 %   03/11/17 2102 - - - - 98 %         RA/O2 flow/device:room air        First Inital Assesment:     [x]Yes []No   Reevaluation/Reassessment:    []Yes []No     CHART REVIEW   Points 0 X 1 X 2 X 3 X 4 X Points   Pulmonary History Smoking History (1) none  Recent Smoking History <1 PPD  Recent Smoking History >1 PPD  Pulmonary Disease or Impairment  Severe Pulmonary Disease  0   Surgical History No Surgery  General Surgery  Lower Abdominal  Thoracic or Upper Abdominal  Thoracic & Pulmonary Disease  0   CXR Clear or not indicated  Chronic changes or CXR Pending  Infiltrate, atelectasis or pleural effusions  Infiltrates in more than 1lobe  Infiltrates +atelectasis  +/or pleural effusions  0     PATIENT ASSESSMENT    0 X 1 X 2 X 3 X 4 X Points   Respiratory Pattern Regular pattern RR 12-20  Increased RR 21-27   Mild Dyspnea at rest, irregular pattern RR 28-32  Moderate Dyspnea at rest, Use of accessory muscles, RR 33-36  Severe Dyspnea, Use of accessory muscles RR >36  0   Mental Status Alert Oriented cooperative  Confused, Follows commands  Lethargic, Does not follow commands  Obtunded  Unresponsive  0   Breath Sounds Clear  Decreased Unilaterally  Decreased Bilaterally  Mild Scattered wheezing or Crackles in bases  Severe Wheezing or rhonchi  3   Cough Strong dry NPC  Strong Productive  Weak NPC  Weak productive or weak with rhonchi  No cough or may require suctioning  0   Level of Activity Ambulatory  Ambulatory with assistance  Temporarily Non-ambulatory  Non-Ambulatory, able to position self  Unable to position self, confined to bed 0   Total Points/Score:   3     Specific Intervention Chart(lung expansion)    Bronchial Hygiene/Secretion Clearance:    []EZPAP []Rotation bed with vibration    []CPT with percussor []CPT via vest   []Oscillastiang positive pressure expiratory device      Lung Expansion:    []Incentive Spirometer w/RT visits [x]Incentive Spirometer w/nursing    []EZPAP      *Suctioning:    []Nasal Tracheal []Tracheal     *suctioning will be ordered and done PRN with an associated frequency such as QID/PRN based on score       Other:    Care Plan   Level # Score Modality Frequency Comment   Level 1 >17 0 0    Level 2 14-17 0 0    Level 3 10-13 0 0    Level 4 1-9 IS BID      BRONCHIAL HYGIENE SCORING AND FREQUENCY GUIDELINES   Frequency Indications/Findings Level #   Q4 ATC Copious secretions, SOB, unable to sleep 1   QID & PRN at night Moderate amounts of secretions 2   TID or Q6 wa Small amounts of secretions and poor cough: recent history of secretions 3   BID or Q8 wa Unable to deep breathe and cough effectively 4        Comments:  IS BID    Respiratory Therapist: Junior Huggins, RT

## 2017-03-12 NOTE — PROGRESS NOTES
0740 Received on rounds in bed. Eyes closed appears to be sleeping. Side rails X2. Call bell phone within reach.

## 2017-03-12 NOTE — DISCHARGE INSTRUCTIONS
DISCHARGE SUMMARY from Nurse    The following personal items are in your possession at time of discharge:    Dental Appliances: None  Visual Aid: At home, Glasses     Home Medications: None  Jewelry: Earrings, With patient  Clothing: At bedside, Pants, Shirt  Other Valuables: None  Personal Items Sent to Safe: none          PATIENT INSTRUCTIONS:    After general anesthesia or intravenous sedation, for 24 hours or while taking prescription Narcotics:  · Limit your activities  · Do not drive and operate hazardous machinery  · Do not make important personal or business decisions  · Do  not drink alcoholic beverages  · If you have not urinated within 8 hours after discharge, please contact your surgeon on call. Report the following to your surgeon:  · Excessive pain, swelling, redness or odor of or around the surgical area  · Temperature over 100.5  · Nausea and vomiting lasting longer than 4 hours or if unable to take medications  · Any signs of decreased circulation or nerve impairment to extremity: change in color, persistent  numbness, tingling, coldness or increase pain  · Any questions        What to do at Home:  Recommended activity: Activity as tolerated. If you experience any of the following symptoms fever 101 or greater, nausea, vomiting, diarrhea, shortness of breath, any pain, any problems or concerns. *  Please give a list of your current medications to your Primary Care Provider. *  Please update this list whenever your medications are discontinued, doses are      changed, or new medications (including over-the-counter products) are added. *  Please carry medication information at all times in case of emergency situations. These are general instructions for a healthy lifestyle:    No smoking/ No tobacco products/ Avoid exposure to second hand smoke    Surgeon General's Warning:  Quitting smoking now greatly reduces serious risk to your health.     Obesity, smoking, and sedentary lifestyle greatly increases your risk for illness    A healthy diet, regular physical exercise & weight monitoring are important for maintaining a healthy lifestyle    You may be retaining fluid if you have a history of heart failure or if you experience any of the following symptoms:  Weight gain of 3 pounds or more overnight or 5 pounds in a week, increased swelling in our hands or feet or shortness of breath while lying flat in bed. Please call your doctor as soon as you notice any of these symptoms; do not wait until your next office visit. Recognize signs and symptoms of STROKE:    F-face looks uneven    A-arms unable to move or move unevenly    S-speech slurred or non-existent    T-time-call 911 as soon as signs and symptoms begin-DO NOT go       Back to bed or wait to see if you get better-TIME IS BRAIN. Warning Signs of HEART ATTACK     Call 911 if you have these symptoms:   Chest discomfort. Most heart attacks involve discomfort in the center of the chest that lasts more than a few minutes, or that goes away and comes back. It can feel like uncomfortable pressure, squeezing, fullness, or pain.  Discomfort in other areas of the upper body. Symptoms can include pain or discomfort in one or both arms, the back, neck, jaw, or stomach.  Shortness of breath with or without chest discomfort.  Other signs may include breaking out in a cold sweat, nausea, or lightheadedness. Don't wait more than five minutes to call 911 - MINUTES MATTER! Fast action can save your life. Calling 911 is almost always the fastest way to get lifesaving treatment. Emergency Medical Services staff can begin treatment when they arrive -- up to an hour sooner than if someone gets to the hospital by car. The discharge information has been reviewed with the patient. The patient verbalized understanding.     Discharge medications reviewed with the patient and appropriate educational materials and side effects teaching were provided. Patient armband removed and shredded  MyChart Activation    Thank you for requesting access to [employer]. Please follow the instructions below to securely access and download your online medical record. [employer] allows you to send messages to your doctor, view your test results, renew your prescriptions, schedule appointments, and more. How Do I Sign Up? 1. In your internet browser, go to [email or web address]  2. Click on the First Time User? Click Here link in the Sign In box. You will be redirect to the New Member Sign Up page. 3. Enter your [employer] Access Code exactly as it appears below. You will not need to use this code after youve completed the sign-up process. If you do not sign up before the expiration date, you must request a new code. [employer] Access Code: [email or web address]  Expires: 2017  4:13 AM (This is the date your [employer] access code will )    4. Enter the last four digits of your Social Security Number (xxxx) and Date of Birth (mm/dd/yyyy) as indicated and click Submit. You will be taken to the next sign-up page. 5. Create a [employer] ID. This will be your [employer] login ID and cannot be changed, so think of one that is secure and easy to remember. 6. Create a [employer] password. You can change your password at any time. 7. Enter your Password Reset Question and Answer. This can be used at a later time if you forget your password. 8. Enter your e-mail address. You will receive e-mail notification when new information is available in [address]. 9. Click Sign Up. You can now view and download portions of your medical record. 10. Click the Download Summary menu link to download a portable copy of your medical information. Additional Information    If you have questions, please visit the Frequently Asked Questions section of the [employer] website at [email or web address] Remember, [employer] is NOT to be used for urgent needs.  For medical emergencies, dial 911.

## 2017-03-12 NOTE — PROGRESS NOTES
[facility]  Pulmonary, Critical Care, and Sleep Medicine    Name: Nicole Grier MRN: [id number]   : 1965 Hospital: [facility]   Date: 3/12/2017  Room: [facility]     Subjective:     Patient seen and examined at bedside. No new complaints. Reports coughing up yellowish phlegm. No pleuritic pain. Does not have home CPAP in room.     Past Medical History:   Diagnosis Date    Asthma     Chronic obstructive pulmonary disease ([address]     Endocrine disease     thyroid issues    Gastrointestinal disorder     \"blockage in my stomach\"    Hypertension        Current Facility-Administered Medications   Medication Dose Route Frequency    [START ON 3/13/2017] predniSONE (DELTASONE) tablet 40 mg  40 mg Oral DAILY WITH BREAKFAST    arformoterol (BROVANA) neb solution 15 mcg  15 mcg Nebulization BID RT    oxyCODONE-acetaminophen (PERCOCET 10)  mg per tablet 1 Tab  1 Tab Oral Q6H PRN    ALPRAZolam (XANAX) tablet 1 mg  1 mg Oral BID    amLODIPine (NORVASC) tablet 10 mg  10 mg Oral DAILY    bumetanide (BUMEX) tablet 0.5 mg  0.5 mg Oral Q48H    famotidine (PEPCID) tablet 20 mg  20 mg Oral DAILY    FLUoxetine (PROzac) capsule 60 mg  60 mg Oral DAILY    montelukast (SINGULAIR) tablet 10 mg  10 mg Oral QHS    tiotropium (SPIRIVA) inhalation capsule 18 mcg  1 Cap Inhalation DAILY    hydrALAZINE (APRESOLINE) tablet 50 mg  50 mg Oral TID    sodium chloride (NS) flush 5-10 mL  5-10 mL IntraVENous Q8H    sodium chloride (NS) flush 5-10 mL  5-10 mL IntraVENous PRN    heparin (porcine) injection 5,000 Units  5,000 Units SubCUTAneous Q8H    acetaminophen (TYLENOL) tablet 650 mg  650 mg Oral Q6H PRN    levoFLOXacin (LEVAQUIN) 750 mg in D5W IVPB  750 mg IntraVENous Q24H    insulin lispro (HUMALOG) injection   SubCUTAneous AC&HS    glucose chewable tablet 16 g  4 Tab Oral PRN    glucagon (GLUCAGEN) injection 1 mg  1 mg IntraMUSCular PRN    dextrose (D50W) injection syrg 12.5-25 g 25-50 mL IntraVENous PRN    budesonide (PULMICORT) 500 mcg/2 ml nebulizer suspension  500 mcg Nebulization BID RT    albuterol (PROVENTIL VENTOLIN) nebulizer solution 2.5 mg  2.5 mg Nebulization Q6H RT       No Known Allergies    Review of Systems:  Denies chest pain or headache    Objective:   Vital Signs:    Visit Vitals    /63 (BP Patient Position: Lying left side)    Pulse 88    Temp 97.5 °F (36.4 °C)    Resp 18    Ht 4' 11\" (1.499 m)    Wt 114.3 kg (252 lb)    SpO2 98%    Breastfeeding No    BMI 50.9 kg/m2       O2 Device: Nasal cannula   O2 Flow Rate (L/min): 2 l/min   Temp (24hrs), Av.6 °F (36.4 °C), Min:97.2 °F (36.2 °C), Max:98.3 °F (36.8 °C)       Intake/Output:   No intake or output data in the 24 hours ending 17 1425      Physical Exam:   General: comfortable, obese  Neck: No adenopathy or thyroid swelling  CVS: S1S2 no murmurs  RS: improved air entry noted b/l, no wheezing heard  Abd: soft, non tender, no hepatosplenomegaly  Neuro: non focal, awake, alert  Extrm: no leg edema   Skin: no rash    Data review:   Labs:  Recent Results (from the past 12 hour(s))   METABOLIC PANEL, BASIC    Collection Time: 17  4:43 AM   Result Value Ref Range    Sodium 135 (L) 136 - 145 mmol/L    Potassium 4.9 3.5 - 5.5 mmol/L    Chloride 102 100 - 108 mmol/L    CO2 26 21 - 32 mmol/L    Anion gap 7 3.0 - 18 mmol/L    Glucose 149 (H) 74 - 99 mg/dL    BUN 24 (H) 7.0 - 18 MG/DL    Creatinine 0.81 0.6 - 1.3 MG/DL    BUN/Creatinine ratio 30 (H) 12 - 20      GFR est AA >60 >60 ml/min/1.73m2    GFR est non-AA >60 >60 ml/min/1.73m2    Calcium 8.1 (L) 8.5 - 10.1 MG/DL   CBC WITH AUTOMATED DIFF    Collection Time: 17  4:43 AM   Result Value Ref Range    WBC 13.5 (H) 4.6 - 13.2 K/uL    RBC 4.74 4.20 - 5.30 M/uL    HGB 13.5 12.0 - 16.0 g/dL    HCT 42.2 35.0 - 45.0 %    MCV 89.0 74.0 - 97.0 FL    MCH 28.5 24.0 - 34.0 PG    MCHC 32.0 31.0 - 37.0 g/dL    RDW 13.6 11.6 - 14.5 %    PLATELET 682 163 - 420 K/uL    MPV 10.8 9.2 - 11.8 FL    NEUTROPHILS 96 (H) 42 - 75 %    LYMPHOCYTES 2 (L) 20 - 51 %    MONOCYTES 2 2 - 9 %    EOSINOPHILS 0 0 - 5 %    BASOPHILS 0 0 - 3 %    ABS. NEUTROPHILS 12.9 (H) 1.8 - 8.0 K/UL    ABS. LYMPHOCYTES 0.3 (L) 0.8 - 3.5 K/UL    ABS. MONOCYTES 0.3 0 - 1.0 K/UL    ABS. EOSINOPHILS 0.0 0.0 - 0.4 K/UL    ABS. BASOPHILS 0.0 0.0 - 0.06 K/UL    DF MANUAL      PLATELET COMMENTS ADEQUATE PLATELETS      RBC COMMENTS NORMOCYTIC, NORMOCHROMIC     GLUCOSE, POC    Collection Time: 03/12/17  8:31 AM   Result Value Ref Range    Glucose (POC) 140 (H) 70 - 110 mg/dL   CULTURE, RESPIRATORY/SPUTUM/BRONCH W GRAM STAIN    Collection Time: 03/12/17 10:48 AM   Result Value Ref Range    Special Requests: NO SPECIAL REQUESTS      GRAM STAIN <10 WBC/lpf      GRAM STAIN <10 EPI/lpf      GRAM STAIN MUCUS PRESENT      GRAM STAIN        FEW  GRAM POSITIVE COCCI  IN PAIRS  IN CHAINS  IN GROUPS      GRAM STAIN RARE  GRAM POSITIVE RODS        Culture result: PENDING    GLUCOSE, POC    Collection Time: 03/12/17 12:20 PM   Result Value Ref Range    Glucose (POC) 141 (H) 70 - 110 mg/dL       Imaging:  I have personally reviewed the patients radiographs and have reviewed the reports:  CxR 3/10/2017 : Pulmonary vascular congestion noted, no acute infiltrates     IMPRESSION:   · Acute exacerbation of Severe asthma / COPD  · MY-non compliant with PAP therapy  · Obesity      RECOMMENDATIONS:   · Wean steroisd to PO. Can transition antibiotics to oral. On BD. Wean O2 to maintain sats > 90%. · Encouraged to be compliant with CPAP. · Will need out-patient follow up in pulmonary clinic. · Minimize use of narcotics/benzos. · DVT prophylaxis.         Mik Coon MD

## 2017-03-13 ENCOUNTER — HOME HEALTH ADMISSION (OUTPATIENT)
Dept: HOME HEALTH SERVICES | Facility: HOME HEALTH | Age: 52
End: 2017-03-13

## 2017-03-13 NOTE — HOME CARE
This nurse was unable to contact patient via either listed number - voicemail left again - liaison nurse will follow up in the am - S.  Bridger Pepe

## 2017-03-13 NOTE — HOME CARE
[facility] liasions received [facility] referral for patient this am. This nurse has tried to contact patient at both numbers listed and messages left with this nurse's name, agency and phone number to return call. No PCP is noted for patient which is needed for a [facility] visit so visit report may be sent to MD. Carin Burns return call from patient before referral can be processed or PCP set up for patient - S.  Marixa Ramos LPN

## 2017-03-14 LAB
BACTERIA SPEC CULT: NORMAL
GRAM STN SPEC: NORMAL
SERVICE CMNT-IMP: NORMAL

## 2017-03-14 NOTE — HOME CARE
No PCP set up for patient - unable to follow for [facility] without a PCP to call report to [facility] - awaiting PCP to be set up - D Andressa WAGGONER

## 2017-03-16 ENCOUNTER — TELEPHONE (OUTPATIENT)
Dept: MEDSURG UNIT | Age: 52
End: 2017-03-16

## 2017-03-29 NOTE — DISCHARGE SUMMARY
[address]  [address] [place] SUMMARY    Name:  Giuseppe Martinez  MR#:  [id number]  :  1965  Account #:  [de-identified]  Date of Adm:  03/10/2017  Date of Discharge:  2017      DISCHARGE DIAGNOSES:  1. Chronic obstructive pulmonary disease with acute exacerbation. 2. Hypertension. 3. Obesity. 4. Chronic respiratory failure on home oxygen. 5. Obstructive sleep apnea, noncompliant with CPAP. HOSPITAL COURSE: This is a 69-year-old female who presented to  the ER with complaints of shortness of breath. The patient was  admitted for COPD exacerbation. The patient was started on oxygen,  bronchodilators, steroids, and antibiotics. Cultures were requested. Pulmonary was consulted. The patient was noted to have some  uncontrolled hypertension. Her home medication regimen was started. The patient was counseled regarding compliance with medications and  regular followup with physicians. The patient's breathing showed  improvement. Pulmonary was also consulted. The patient was placed  on the bronchial hygiene protocol. The patient felt better and wished to  go home, so the patient was discharged to home. FOLLOWUP: The patient was advised to followup with PCP in one  week and Pulmonary in two weeks. The patient was hemodynamically  stable at the time of discharge. DISCHARGE MEDICATIONS included:  1. Levofloxacin 500 mg p.o. for every 24 hours for five more doses. 2. Check-up CBC, CMP, magnesium in 5 days. 3. Resume home oxygen. 4. Resume CPAP at bedtime. 5. Percocet 10/325 one tablet p.o. every 8 hours p.r.n. pain. 6. Albuterol two puffs inhaled every 6 hours as needed for shortness of  breath. 7. Albuterol nebulizer treatment as needed. 8. Prednisone taper as directed. 9. Xanax 1 mg p.o. twice daily. 10. Amlodipine 10 mg p.o. daily. 11. Brovana 15 mcg nebulized twice daily. 12. Pulmicort 500 mcg nebulized twice daily. 13. Symbicort two puffs inhaled twice daily.   14. Bumex 0.5 mg p.o. every 48 hours. 15. Pepcid 20 mg p.o. daily. 16. Prozac 60 mg p.o. daily. 17. Hydralazine 50 mg p.o. 3 times daily. 18. Singulair 10 mg p.o. daily. 19. Spiriva one capsule inhaled daily. Discharge plans were discussed with the patient and she verbalized  understanding and agreed. I counseled the patient regarding  compliance with medications and regular followup with physicians and  following physician instructions.         Elidia Gosselin, MD [place] / Ronak Fuentes  D:  03/28/2017   23:36  T:  03/29/2017   12:57  Job #:  [id number]

## 2017-04-09 ENCOUNTER — APPOINTMENT (OUTPATIENT)
Dept: GENERAL RADIOLOGY | Age: 52
End: 2017-04-09
Attending: EMERGENCY MEDICINE
Payer: MEDICAID

## 2017-04-09 ENCOUNTER — HOSPITAL ENCOUNTER (EMERGENCY)
Age: 52
Discharge: HOME | End: 2017-04-09
Attending: EMERGENCY MEDICINE | Admitting: EMERGENCY MEDICINE
Payer: MEDICAID

## 2017-04-09 VITALS
HEART RATE: 84 BPM | DIASTOLIC BLOOD PRESSURE: 86 MMHG | OXYGEN SATURATION: 93 % | TEMPERATURE: 98.6 F | BODY MASS INDEX: 50.27 KG/M2 | WEIGHT: 273.15 LBS | RESPIRATION RATE: 22 BRPM | SYSTOLIC BLOOD PRESSURE: 189 MMHG | HEIGHT: 62 IN

## 2017-04-09 DIAGNOSIS — J44.1 ACUTE EXACERBATION OF CHRONIC OBSTRUCTIVE PULMONARY DISEASE (COPD) (HCC): Primary | ICD-10-CM

## 2017-04-09 LAB
ALBUMIN SERPL BCP-MCNC: 3.6 G/DL (ref 3.4–5)
ALBUMIN/GLOB SERPL: 1 {RATIO} (ref 0.8–1.7)
ALP SERPL-CCNC: 140 U/L (ref 45–117)
ALT SERPL-CCNC: 25 U/L (ref 13–56)
ANION GAP BLD CALC-SCNC: 8 MMOL/L (ref 3–18)
ARTERIAL PATENCY WRIST A: YES
AST SERPL W P-5'-P-CCNC: 25 U/L (ref 15–37)
BASE DEFICIT BLD-SCNC: 3 MMOL/L
BASOPHILS # BLD AUTO: 0 K/UL (ref 0–0.1)
BASOPHILS # BLD: 0 % (ref 0–2)
BDY SITE: ABNORMAL
BILIRUB SERPL-MCNC: 1.1 MG/DL (ref 0.2–1)
BNP SERPL-MCNC: 245 PG/ML (ref 0–900)
BUN SERPL-MCNC: 17 MG/DL (ref 7–18)
BUN/CREAT SERPL: 13 (ref 12–20)
CALCIUM SERPL-MCNC: 8.3 MG/DL (ref 8.5–10.1)
CHLORIDE SERPL-SCNC: 101 MMOL/L (ref 100–108)
CK MB CFR SERPL CALC: 2.1 % (ref 0–4)
CK MB CFR SERPL CALC: 2.2 % (ref 0–4)
CK MB SERPL-MCNC: 7.7 NG/ML (ref 5–25)
CK MB SERPL-MCNC: 7.8 NG/ML (ref 5–25)
CK SERPL-CCNC: 345 U/L (ref 26–192)
CK SERPL-CCNC: 364 U/L (ref 26–192)
CO2 SERPL-SCNC: 28 MMOL/L (ref 21–32)
CREAT SERPL-MCNC: 1.27 MG/DL (ref 0.6–1.3)
DIFFERENTIAL METHOD BLD: ABNORMAL
EOSINOPHIL # BLD: 0.1 K/UL (ref 0–0.4)
EOSINOPHIL NFR BLD: 1 % (ref 0–5)
ERYTHROCYTE [DISTWIDTH] IN BLOOD BY AUTOMATED COUNT: 14.9 % (ref 11.6–14.5)
GAS FLOW.O2 O2 DELIVERY SYS: ABNORMAL L/MIN
GLOBULIN SER CALC-MCNC: 3.6 G/DL (ref 2–4)
GLUCOSE SERPL-MCNC: 91 MG/DL (ref 74–99)
HCO3 BLD-SCNC: 23.2 MMOL/L (ref 22–26)
HCT VFR BLD AUTO: 37.9 % (ref 35–45)
HGB BLD-MCNC: 12.2 G/DL (ref 12–16)
LYMPHOCYTES # BLD AUTO: 16 % (ref 21–52)
LYMPHOCYTES # BLD: 1.6 K/UL (ref 0.9–3.6)
MCH RBC QN AUTO: 27.2 PG (ref 24–34)
MCHC RBC AUTO-ENTMCNC: 32.2 G/DL (ref 31–37)
MCV RBC AUTO: 84.6 FL (ref 74–97)
MONOCYTES # BLD: 0.6 K/UL (ref 0.05–1.2)
MONOCYTES NFR BLD AUTO: 6 % (ref 3–10)
NEUTS SEG # BLD: 7.7 K/UL (ref 1.8–8)
NEUTS SEG NFR BLD AUTO: 77 % (ref 40–73)
O2/TOTAL GAS SETTING VFR VENT: 0.21 %
PCO2 BLD: 42.5 MMHG (ref 35–45)
PH BLD: 7.34 [PH] (ref 7.35–7.45)
PLATELET # BLD AUTO: 309 K/UL (ref 135–420)
PMV BLD AUTO: 10.4 FL (ref 9.2–11.8)
PO2 BLD: 59 MMHG (ref 80–100)
POTASSIUM SERPL-SCNC: 2.8 MMOL/L (ref 3.5–5.5)
PROT SERPL-MCNC: 7.2 G/DL (ref 6.4–8.2)
RBC # BLD AUTO: 4.48 M/UL (ref 4.2–5.3)
SAO2 % BLD: 89 % (ref 92–97)
SERVICE CMNT-IMP: ABNORMAL
SODIUM SERPL-SCNC: 137 MMOL/L (ref 136–145)
SPECIMEN TYPE: ABNORMAL
TOTAL RESP. RATE, ITRR: 10
TROPONIN I BLD-MCNC: <0.04 NG/ML (ref 0–0.08)
TROPONIN I SERPL-MCNC: <0.02 NG/ML (ref 0–0.04)
TROPONIN I SERPL-MCNC: <0.02 NG/ML (ref 0–0.04)
WBC # BLD AUTO: 9.9 K/UL (ref 4.6–13.2)

## 2017-04-09 PROCEDURE — 71010 XR CHEST PORT: CPT

## 2017-04-09 PROCEDURE — 77030013140 HC MSK NEB VYRM -A

## 2017-04-09 PROCEDURE — 94640 AIRWAY INHALATION TREATMENT: CPT

## 2017-04-09 PROCEDURE — 74011636637 HC RX REV CODE- 636/637: Performed by: EMERGENCY MEDICINE

## 2017-04-09 PROCEDURE — 83880 ASSAY OF NATRIURETIC PEPTIDE: CPT | Performed by: EMERGENCY MEDICINE

## 2017-04-09 PROCEDURE — 82550 ASSAY OF CK (CPK): CPT | Performed by: EMERGENCY MEDICINE

## 2017-04-09 PROCEDURE — 84484 ASSAY OF TROPONIN QUANT: CPT | Performed by: EMERGENCY MEDICINE

## 2017-04-09 PROCEDURE — 82803 BLOOD GASES ANY COMBINATION: CPT

## 2017-04-09 PROCEDURE — 99285 EMERGENCY DEPT VISIT HI MDM: CPT

## 2017-04-09 PROCEDURE — 74011250637 HC RX REV CODE- 250/637: Performed by: EMERGENCY MEDICINE

## 2017-04-09 PROCEDURE — 93005 ELECTROCARDIOGRAM TRACING: CPT

## 2017-04-09 PROCEDURE — 36600 WITHDRAWAL OF ARTERIAL BLOOD: CPT

## 2017-04-09 PROCEDURE — 74011000250 HC RX REV CODE- 250: Performed by: EMERGENCY MEDICINE

## 2017-04-09 PROCEDURE — 85025 COMPLETE CBC W/AUTO DIFF WBC: CPT | Performed by: EMERGENCY MEDICINE

## 2017-04-09 PROCEDURE — 80053 COMPREHEN METABOLIC PANEL: CPT | Performed by: EMERGENCY MEDICINE

## 2017-04-09 RX ORDER — PREDNISONE 20 MG/1
60 TABLET ORAL
Status: COMPLETED | OUTPATIENT
Start: 2017-04-09 | End: 2017-04-09

## 2017-04-09 RX ORDER — POTASSIUM CHLORIDE 20 MEQ/1
40 TABLET, EXTENDED RELEASE ORAL
Status: COMPLETED | OUTPATIENT
Start: 2017-04-09 | End: 2017-04-09

## 2017-04-09 RX ORDER — ALBUTEROL SULFATE 0.83 MG/ML
2.5 SOLUTION RESPIRATORY (INHALATION)
Status: COMPLETED | OUTPATIENT
Start: 2017-04-09 | End: 2017-04-09

## 2017-04-09 RX ORDER — ALBUTEROL SULFATE 90 UG/1
1 AEROSOL, METERED RESPIRATORY (INHALATION)
Qty: 1 INHALER | Refills: 0 | Status: SHIPPED | OUTPATIENT
Start: 2017-04-09 | End: 2018-01-26

## 2017-04-09 RX ORDER — PREDNISONE 50 MG/1
50 TABLET ORAL DAILY
Qty: 3 TAB | Refills: 0 | Status: SHIPPED | OUTPATIENT
Start: 2017-04-09 | End: 2017-04-12

## 2017-04-09 RX ADMIN — ALBUTEROL SULFATE 2.5 MG: 2.5 SOLUTION RESPIRATORY (INHALATION) at 17:30

## 2017-04-09 RX ADMIN — POTASSIUM CHLORIDE 40 MEQ: 1500 TABLET, EXTENDED RELEASE ORAL at 20:56

## 2017-04-09 RX ADMIN — POTASSIUM CHLORIDE 40 MEQ: 1500 TABLET, EXTENDED RELEASE ORAL at 18:50

## 2017-04-09 RX ADMIN — PREDNISONE 60 MG: 20 TABLET ORAL at 17:29

## 2017-04-09 NOTE — ED PROVIDER NOTES
HPI Comments: 5:16 PM Jonathan Boswell is a 45 y.o. female with a h/o COPD, Asthma, and HTN presents to the ED via EMS with c/o SOB onset this afternoon. Pt denies nausea, vomiting, diarrhea, chest pain, or cough. All other sx denied. No other complaints at this time. PCP-Shalini Knox MD      The history is provided by the EMS personnel. Past Medical History:   Diagnosis Date    Asthma     Chronic obstructive pulmonary disease ([address]     Endocrine disease     thyroid issues    Gastrointestinal disorder     \"blockage in my stomach\"    Hypertension        History reviewed. No pertinent surgical history. History reviewed. No pertinent family history. Social History     Social History    Marital status:      Spouse name: N/A    Number of children: N/A    Years of education: N/A     Occupational History    Not on file. Social History Main Topics    Smoking status: Never Smoker    Smokeless tobacco: Never Used    Alcohol use No      Comment: socially    Drug use: No    Sexual activity: No      Comment: last used 9 years ago     Other Topics Concern    Not on file     Social History Narrative         ALLERGIES: Review of patient's allergies indicates no known allergies. Review of Systems   All other systems reviewed and are negative. Vitals:    04/09/17 1703   Pulse: 91   Resp: 22   SpO2: 100%   Weight: 123.9 kg (273 lb 2.4 oz)   Height: 5' 2\" (1.575 m)            Physical Exam   Constitutional: She is oriented to person, place, and time. She appears well-developed. HENT:   Head: Normocephalic and atraumatic. Eyes: EOM are normal. Pupils are equal, round, and reactive to light. Neck: Normal range of motion. Neck supple. Cardiovascular: Normal rate, regular rhythm and normal heart sounds. Exam reveals no friction rub. No murmur heard. Pulmonary/Chest: Effort normal. No respiratory distress. She has wheezes. Faint expiratory wheezes   Abdominal: Soft.  She exhibits no distension. There is no tenderness. There is no rebound and no guarding. Musculoskeletal: Normal range of motion. Neurological: She is alert and oriented to person, place, and time. Skin: Skin is warm and dry. Psychiatric: She has a normal mood and affect. Her behavior is normal. Thought content normal.        MDM  Number of Diagnoses or Management Options  Acute exacerbation of chronic obstructive pulmonary disease (COPD) [facility]):   Diagnosis management comments:  EKG: Sinus is 86 total axis normal intervals no ST elevation or depression or hypertrophy. 3  71-year-old female presents with shortness of breath consistent with prior COPD exacerbation. On exam she has mild wheezing at most.  Oxygen sat is about 95% in room air ABG shows a PCO2 of only 40. We will evaluate to the steroids she started and given magnesium by EMS and treat as indicated    6:00 PM pt asked for dialaudid for diffuse pain. K noted at 2.8 ; after multiple abluterol. May be K shift. cxr napd  6:33 PM   By check on the patient she is resting comfortably with her eyes closed not on oxygen satting 92%  With blood pressure 403 systolic. She did just receive multiple albuterol,  She is on Bumex with a history of hypertension and thyroid disease. Her last stress was in 2011 her last echo was in 2010 x-ray does not show significant pulmonary edema    02 92% in room air. Will walk and if normal dc/   bnp neg. Pt sob on exertion to rest room ; will obs; pt has been resting and sleepign with very normal exam, abd and xray. Will re-eval.     ED Course       Procedures                       Scribe Attestation  Lavern Tolland scribing for and in the presence of Kandis Chavez MD 5:19 PM, 04/09/17.     Physician Attestation  I personally performed the services described in the documentation, reviewed the documentation, as recorded by the scribe in my presence, and it accurately and completely records my words and actions.     Demetris Álvarez MD 5:19 PM 04/09/17        Signed by : Cherie Cho, 04/09/17 at 5:19 PM

## 2017-04-09 NOTE — ED TRIAGE NOTES
Per EMS, pt was at [facility] when onset of SOB started. Pt was responsive. Pt received 1 duo-neb, magnesium PTA.  Pt aaox4 at this time

## 2017-04-10 LAB
ATRIAL RATE: 86 BPM
CALCULATED P AXIS, ECG09: 99 DEGREES
CALCULATED R AXIS, ECG10: 144 DEGREES
CALCULATED T AXIS, ECG11: 161 DEGREES
DIAGNOSIS, 93000: NORMAL
P-R INTERVAL, ECG05: 134 MS
Q-T INTERVAL, ECG07: 364 MS
QRS DURATION, ECG06: 80 MS
QTC CALCULATION (BEZET), ECG08: 435 MS
VENTRICULAR RATE, ECG03: 86 BPM

## 2017-04-10 NOTE — ED NOTES
8:30 PM   Patient care transferred from Dr. Netta Rios. 8:40 PM  RT notifies me that the patient refused the ABG. SCRIBE ATTESTATION STATEMENT  Documented by: Aisha nunesing for, and in the presence of, Dasha Abreu MD 8:40 PM     Signed by: Cherie Parrish, 4/9/2017, 8:40 PM    PROVIDER ATTESTATION STATEMENT  I personally performed the services described in the documentation, reviewed the documentation, as recorded by the scribe in my presence, and it accurately and completely records my words and actions.   Dasha Abreu MD

## 2017-04-10 NOTE — ED NOTES
Patient ambulated to the restroom, O2 sats remained at 97%. When told her O2 sats, patient reports that her O2 sats lie and \"unless we ambulated her faster they won't decrease\". Patient complaining of difficulty breathing and put most of her weight onto ED staff while walking. Upon returning to the room the patient hopped into the bed without difficulty.

## 2017-04-10 NOTE — DISCHARGE INSTRUCTIONS
COPD Exacerbation Plan: Care Instructions  Your Care Instructions  If you have chronic obstructive pulmonary disease (COPD), your usual shortness of breath could suddenly get worse. You may start coughing more and have more mucus. This flare-up is called a COPD exacerbation (say \"oh-CKB-[id number]-BAY-[id number]\"). A lung infection or air pollution could set off an exacerbation. Sometimes it can happen after a quick change in temperature or being around chemicals. Work with your doctor to make a plan for dealing with an exacerbation. You can better manage it if you plan ahead. Follow-up care is a key part of your treatment and safety. Be sure to make and go to all appointments, and call your doctor if you are having problems. It's also a good idea to know your test results and keep a list of the medicines you take. How can you care for yourself at home? During an exacerbation  · Do not panic if you start to have one. Quick treatment at home may help you prevent serious breathing problems. If you have a COPD exacerbation plan that you developed with your doctor, follow it. · Take your medicines exactly as your doctor tells you. ¨ Use your inhaler as directed by your doctor. If your symptoms do not get better after you use your medicine, have someone take you to the emergency room. Call an ambulance if necessary. ¨ With inhaled medicines, a spacer or a nebulizer may help you get more medicine to your lungs. Ask your doctor or pharmacist how to use them properly. Practice using the spacer in front of a mirror before you have an exacerbation. This may help you get the medicine into your lungs quickly. ¨ If your doctor has given you steroid pills, take them as directed. ¨ Your doctor may have given you a prescription for antibiotics, which you can fill if you need it. ¨ Talk to your doctor if you have any problems with your medicine. And call your doctor if you have to use your antibiotic or steroid pills.   Preventing an exacerbation  · Do not smoke. This is the most important step you can take to prevent more damage to your lungs and prevent problems. If you already smoke, it is never too late to stop. If you need help quitting, talk to your doctor about stop-smoking programs and medicines. These can increase your chances of quitting for good. · Take your daily medicines as prescribed. · Avoid colds and flu. ¨ Get a pneumococcal vaccine. ¨ Get a flu vaccine each year, as soon as it is available. Ask those you live or work with to do the same, so they will not get the flu and infect you. ¨ Try to stay away from people with colds or the flu. ¨ Wash your hands often. · Avoid secondhand smoke; air pollution; cold, dry air; hot, humid air; and high altitudes. Stay at home with your windows closed when air pollution is bad. · Learn breathing techniques for COPD, such as breathing through pursed lips. These techniques can help you breathe easier during an exacerbation. When should you call for help? Call 911 anytime you think you may need emergency care. For example, call if:  · You have severe trouble breathing. · You have severe chest pain. Call your doctor now or seek immediate medical care if:  · You have new or worse shortness of breath. · You develop new chest pain. · You are coughing more deeply or more often, especially if you notice more mucus or a change in the color of your mucus. · You cough up blood. · You have new or increased swelling in your legs or belly. · You have a fever. Watch closely for changes in your health, and be sure to contact your doctor if:  · You need to use your antibiotic or steroid pills. · Your symptoms are getting worse. Where can you learn more? Go to http://magda-kadie.info/. Enter A069 in the search box to learn more about \"COPD Exacerbation Plan: Care Instructions. \"  Current as of: July 21, 2016  Content Version: 11.2  © 3273-7212 [employer]. Care instructions adapted under license by [employer] (which disclaims liability or warranty for this information). If you have questions about a medical condition or this instruction, always ask your healthcare professional. [address] any warranty or liability for your use of this information.

## 2017-04-10 NOTE — ED NOTES
Received nursing report from [facility]. Patient is A/o X4, asleep on the stretcher at this time. Vital signs are stable, will continue to monitor.

## 2017-04-18 ENCOUNTER — HOSPITAL ENCOUNTER (INPATIENT)
Age: 52
LOS: 3 days | Discharge: HOME | DRG: 140 | End: 2017-04-24
Attending: EMERGENCY MEDICINE | Admitting: FAMILY MEDICINE
Payer: MEDICAID

## 2017-04-18 ENCOUNTER — APPOINTMENT (OUTPATIENT)
Dept: GENERAL RADIOLOGY | Age: 52
DRG: 140 | End: 2017-04-18
Attending: EMERGENCY MEDICINE
Payer: MEDICAID

## 2017-04-18 DIAGNOSIS — J44.1 COPD WITH ACUTE EXACERBATION (HCC): ICD-10-CM

## 2017-04-18 DIAGNOSIS — J96.01 ACUTE RESPIRATORY FAILURE WITH HYPOXIA (HCC): Primary | ICD-10-CM

## 2017-04-18 LAB
ALBUMIN SERPL BCP-MCNC: 3.3 G/DL (ref 3.4–5)
ALBUMIN/GLOB SERPL: 0.8 {RATIO} (ref 0.8–1.7)
ALP SERPL-CCNC: 125 U/L (ref 45–117)
ALT SERPL-CCNC: 24 U/L (ref 13–56)
AMPHET UR QL SCN: NEGATIVE
ANION GAP BLD CALC-SCNC: 7 MMOL/L (ref 3–18)
APPEARANCE UR: CLEAR
APTT PPP: 27.8 SEC (ref 23–36.4)
ARTERIAL PATENCY WRIST A: YES
ARTERIAL PATENCY WRIST A: YES
AST SERPL W P-5'-P-CCNC: 14 U/L (ref 15–37)
ATRIAL RATE: 83 BPM
BARBITURATES UR QL SCN: NEGATIVE
BASE DEFICIT BLD-SCNC: 1 MMOL/L
BASE EXCESS BLD CALC-SCNC: 0 MMOL/L
BASOPHILS # BLD AUTO: 0 K/UL (ref 0–0.1)
BASOPHILS # BLD: 0 % (ref 0–2)
BDY SITE: ABNORMAL
BDY SITE: ABNORMAL
BENZODIAZ UR QL: NEGATIVE
BILIRUB DIRECT SERPL-MCNC: 0.2 MG/DL (ref 0–0.2)
BILIRUB SERPL-MCNC: 0.9 MG/DL (ref 0.2–1)
BILIRUB UR QL: NEGATIVE
BNP SERPL-MCNC: 199 PG/ML (ref 0–900)
BUN SERPL-MCNC: 9 MG/DL (ref 7–18)
BUN/CREAT SERPL: 8 (ref 12–20)
CALCIUM SERPL-MCNC: 9 MG/DL (ref 8.5–10.1)
CALCULATED P AXIS, ECG09: 68 DEGREES
CALCULATED R AXIS, ECG10: 49 DEGREES
CALCULATED T AXIS, ECG11: 71 DEGREES
CANNABINOIDS UR QL SCN: NEGATIVE
CHLORIDE SERPL-SCNC: 105 MMOL/L (ref 100–108)
CK MB CFR SERPL CALC: NORMAL % (ref 0–4)
CK MB SERPL-MCNC: <1 NG/ML (ref 5–25)
CK SERPL-CCNC: 55 U/L (ref 26–192)
CO2 SERPL-SCNC: 29 MMOL/L (ref 21–32)
COCAINE UR QL SCN: NEGATIVE
COLOR UR: YELLOW
CREAT SERPL-MCNC: 1.11 MG/DL (ref 0.6–1.3)
DIAGNOSIS, 93000: NORMAL
DIFFERENTIAL METHOD BLD: ABNORMAL
EOSINOPHIL # BLD: 0.2 K/UL (ref 0–0.4)
EOSINOPHIL NFR BLD: 2 % (ref 0–5)
ERYTHROCYTE [DISTWIDTH] IN BLOOD BY AUTOMATED COUNT: 16 % (ref 11.6–14.5)
EST. AVERAGE GLUCOSE BLD GHB EST-MCNC: 108 MG/DL
FLUAV AG NPH QL IA: NEGATIVE
FLUBV AG NOSE QL IA: NEGATIVE
GAS FLOW.O2 O2 DELIVERY SYS: ABNORMAL L/MIN
GAS FLOW.O2 O2 DELIVERY SYS: ABNORMAL L/MIN
GLOBULIN SER CALC-MCNC: 4.1 G/DL (ref 2–4)
GLUCOSE BLD STRIP.AUTO-MCNC: 141 MG/DL (ref 70–110)
GLUCOSE BLD STRIP.AUTO-MCNC: 173 MG/DL (ref 70–110)
GLUCOSE SERPL-MCNC: 103 MG/DL (ref 74–99)
GLUCOSE UR STRIP.AUTO-MCNC: NEGATIVE MG/DL
HBA1C MFR BLD: 5.4 % (ref 4.2–5.6)
HCG UR QL: NEGATIVE
HCO3 BLD-SCNC: 24 MMOL/L (ref 22–26)
HCO3 BLD-SCNC: 25.4 MMOL/L (ref 22–26)
HCT VFR BLD AUTO: 41.7 % (ref 35–45)
HDSCOM,HDSCOM: ABNORMAL
HGB BLD-MCNC: 13.1 G/DL (ref 12–16)
HGB UR QL STRIP: NEGATIVE
INR PPP: 1.1 (ref 0.8–1.2)
KETONES UR QL STRIP.AUTO: 40 MG/DL
LEUKOCYTE ESTERASE UR QL STRIP.AUTO: NEGATIVE
LYMPHOCYTES # BLD AUTO: 28 % (ref 21–52)
LYMPHOCYTES # BLD: 2.2 K/UL (ref 0.9–3.6)
MCH RBC QN AUTO: 27.2 PG (ref 24–34)
MCHC RBC AUTO-ENTMCNC: 31.4 G/DL (ref 31–37)
MCV RBC AUTO: 86.7 FL (ref 74–97)
METHADONE UR QL: NEGATIVE
MONOCYTES # BLD: 0.5 K/UL (ref 0.05–1.2)
MONOCYTES NFR BLD AUTO: 6 % (ref 3–10)
NEUTS SEG # BLD: 5 K/UL (ref 1.8–8)
NEUTS SEG NFR BLD AUTO: 64 % (ref 40–73)
NITRITE UR QL STRIP.AUTO: NEGATIVE
O2/TOTAL GAS SETTING VFR VENT: 21 %
O2/TOTAL GAS SETTING VFR VENT: 30 %
OPIATES UR QL: POSITIVE
P-R INTERVAL, ECG05: 134 MS
PCO2 BLD: 35.1 MMHG (ref 35–45)
PCO2 BLD: 47.9 MMHG (ref 35–45)
PCP UR QL: NEGATIVE
PEEP RESPIRATORY: 6 CMH2O
PH BLD: 7.33 [PH] (ref 7.35–7.45)
PH BLD: 7.44 [PH] (ref 7.35–7.45)
PH UR STRIP: 5.5 [PH] (ref 5–8)
PLATELET # BLD AUTO: 201 K/UL (ref 135–420)
PMV BLD AUTO: 11.7 FL (ref 9.2–11.8)
PO2 BLD: 56 MMHG (ref 80–100)
PO2 BLD: 75 MMHG (ref 80–100)
POTASSIUM SERPL-SCNC: 3.2 MMOL/L (ref 3.5–5.5)
PRESSURE SUPPORT SETTING VENT: 10 CMH2O
PROT SERPL-MCNC: 7.4 G/DL (ref 6.4–8.2)
PROT UR STRIP-MCNC: NEGATIVE MG/DL
PROTHROMBIN TIME: 13.7 SEC (ref 11.5–15.2)
Q-T INTERVAL, ECG07: 396 MS
QRS DURATION, ECG06: 76 MS
QTC CALCULATION (BEZET), ECG08: 465 MS
RBC # BLD AUTO: 4.81 M/UL (ref 4.2–5.3)
SAO2 % BLD: 90 % (ref 92–97)
SAO2 % BLD: 94 % (ref 92–97)
SERVICE CMNT-IMP: ABNORMAL
SERVICE CMNT-IMP: ABNORMAL
SODIUM SERPL-SCNC: 141 MMOL/L (ref 136–145)
SP GR UR REFRACTOMETRY: 1.02 (ref 1–1.03)
SPECIMEN TYPE: ABNORMAL
SPECIMEN TYPE: ABNORMAL
SPONTANEOUS TIMED, IST: YES
T4 FREE SERPL-MCNC: 1.3 NG/DL (ref 0.7–1.5)
TOTAL RESP. RATE, ITRR: 22
TOTAL RESP. RATE, ITRR: 26
TROPONIN I SERPL-MCNC: <0.02 NG/ML (ref 0–0.04)
TSH SERPL DL<=0.05 MIU/L-ACNC: 0.07 UIU/ML (ref 0.36–3.74)
UROBILINOGEN UR QL STRIP.AUTO: 1 EU/DL (ref 0.2–1)
VENTRICULAR RATE, ECG03: 83 BPM
WBC # BLD AUTO: 7.8 K/UL (ref 4.6–13.2)

## 2017-04-18 PROCEDURE — 85730 THROMBOPLASTIN TIME PARTIAL: CPT | Performed by: EMERGENCY MEDICINE

## 2017-04-18 PROCEDURE — 74011250636 HC RX REV CODE- 250/636: Performed by: INTERNAL MEDICINE

## 2017-04-18 PROCEDURE — 36600 WITHDRAWAL OF ARTERIAL BLOOD: CPT

## 2017-04-18 PROCEDURE — 71010 XR CHEST PORT: CPT

## 2017-04-18 PROCEDURE — 77030013140 HC MSK NEB VYRM -A

## 2017-04-18 PROCEDURE — 96372 THER/PROPH/DIAG INJ SC/IM: CPT

## 2017-04-18 PROCEDURE — 84443 ASSAY THYROID STIM HORMONE: CPT | Performed by: EMERGENCY MEDICINE

## 2017-04-18 PROCEDURE — 84439 ASSAY OF FREE THYROXINE: CPT | Performed by: NURSE PRACTITIONER

## 2017-04-18 PROCEDURE — 74011250637 HC RX REV CODE- 250/637: Performed by: INTERNAL MEDICINE

## 2017-04-18 PROCEDURE — 80048 BASIC METABOLIC PNL TOTAL CA: CPT | Performed by: EMERGENCY MEDICINE

## 2017-04-18 PROCEDURE — 74011000250 HC RX REV CODE- 250: Performed by: NURSE PRACTITIONER

## 2017-04-18 PROCEDURE — 96375 TX/PRO/DX INJ NEW DRUG ADDON: CPT

## 2017-04-18 PROCEDURE — 99285 EMERGENCY DEPT VISIT HI MDM: CPT

## 2017-04-18 PROCEDURE — 74011000258 HC RX REV CODE- 258: Performed by: INTERNAL MEDICINE

## 2017-04-18 PROCEDURE — 94640 AIRWAY INHALATION TREATMENT: CPT

## 2017-04-18 PROCEDURE — 74011000250 HC RX REV CODE- 250: Performed by: INTERNAL MEDICINE

## 2017-04-18 PROCEDURE — 87804 INFLUENZA ASSAY W/OPTIC: CPT | Performed by: NURSE PRACTITIONER

## 2017-04-18 PROCEDURE — 82803 BLOOD GASES ANY COMBINATION: CPT

## 2017-04-18 PROCEDURE — 83880 ASSAY OF NATRIURETIC PEPTIDE: CPT | Performed by: EMERGENCY MEDICINE

## 2017-04-18 PROCEDURE — 74011636637 HC RX REV CODE- 636/637: Performed by: INTERNAL MEDICINE

## 2017-04-18 PROCEDURE — 94762 N-INVAS EAR/PLS OXIMTRY CONT: CPT

## 2017-04-18 PROCEDURE — 74011250636 HC RX REV CODE- 250/636: Performed by: EMERGENCY MEDICINE

## 2017-04-18 PROCEDURE — 96365 THER/PROPH/DIAG IV INF INIT: CPT

## 2017-04-18 PROCEDURE — 74011250636 HC RX REV CODE- 250/636: Performed by: NURSE PRACTITIONER

## 2017-04-18 PROCEDURE — 81003 URINALYSIS AUTO W/O SCOPE: CPT | Performed by: EMERGENCY MEDICINE

## 2017-04-18 PROCEDURE — 85610 PROTHROMBIN TIME: CPT | Performed by: EMERGENCY MEDICINE

## 2017-04-18 PROCEDURE — 85025 COMPLETE CBC W/AUTO DIFF WBC: CPT | Performed by: EMERGENCY MEDICINE

## 2017-04-18 PROCEDURE — 87070 CULTURE OTHR SPECIMN AEROBIC: CPT | Performed by: NURSE PRACTITIONER

## 2017-04-18 PROCEDURE — 93005 ELECTROCARDIOGRAM TRACING: CPT

## 2017-04-18 PROCEDURE — 94660 CPAP INITIATION&MGMT: CPT

## 2017-04-18 PROCEDURE — 82550 ASSAY OF CK (CPK): CPT | Performed by: EMERGENCY MEDICINE

## 2017-04-18 PROCEDURE — 99218 HC RM OBSERVATION: CPT

## 2017-04-18 PROCEDURE — 96366 THER/PROPH/DIAG IV INF ADDON: CPT

## 2017-04-18 PROCEDURE — 81025 URINE PREGNANCY TEST: CPT | Performed by: INTERNAL MEDICINE

## 2017-04-18 PROCEDURE — 83036 HEMOGLOBIN GLYCOSYLATED A1C: CPT | Performed by: INTERNAL MEDICINE

## 2017-04-18 PROCEDURE — 82962 GLUCOSE BLOOD TEST: CPT

## 2017-04-18 PROCEDURE — 80076 HEPATIC FUNCTION PANEL: CPT | Performed by: EMERGENCY MEDICINE

## 2017-04-18 PROCEDURE — 94664 DEMO&/EVAL PT USE INHALER: CPT

## 2017-04-18 PROCEDURE — 74011000250 HC RX REV CODE- 250: Performed by: EMERGENCY MEDICINE

## 2017-04-18 PROCEDURE — 96376 TX/PRO/DX INJ SAME DRUG ADON: CPT

## 2017-04-18 PROCEDURE — 80307 DRUG TEST PRSMV CHEM ANLYZR: CPT | Performed by: NURSE PRACTITIONER

## 2017-04-18 PROCEDURE — 77030013033 HC MSK BPAP/CPAP MMKA -B

## 2017-04-18 RX ORDER — ENOXAPARIN SODIUM 100 MG/ML
40 INJECTION SUBCUTANEOUS EVERY 24 HOURS
Status: DISCONTINUED | OUTPATIENT
Start: 2017-04-18 | End: 2017-04-24 | Stop reason: HOSPADM

## 2017-04-18 RX ORDER — HYDROMORPHONE HYDROCHLORIDE 1 MG/ML
1 INJECTION, SOLUTION INTRAMUSCULAR; INTRAVENOUS; SUBCUTANEOUS ONCE
Status: COMPLETED | OUTPATIENT
Start: 2017-04-18 | End: 2017-04-18

## 2017-04-18 RX ORDER — FLUOXETINE HYDROCHLORIDE 20 MG/1
60 CAPSULE ORAL DAILY
Status: DISCONTINUED | OUTPATIENT
Start: 2017-04-19 | End: 2017-04-24 | Stop reason: HOSPADM

## 2017-04-18 RX ORDER — ALBUTEROL SULFATE 0.83 MG/ML
SOLUTION RESPIRATORY (INHALATION)
Status: DISPENSED
Start: 2017-04-18 | End: 2017-04-19

## 2017-04-18 RX ORDER — ALBUTEROL SULFATE 0.83 MG/ML
2.5 SOLUTION RESPIRATORY (INHALATION)
Status: COMPLETED | OUTPATIENT
Start: 2017-04-18 | End: 2017-04-18

## 2017-04-18 RX ORDER — ONDANSETRON 2 MG/ML
4 INJECTION INTRAMUSCULAR; INTRAVENOUS
Status: COMPLETED | OUTPATIENT
Start: 2017-04-18 | End: 2017-04-18

## 2017-04-18 RX ORDER — SODIUM CHLORIDE 0.9 % (FLUSH) 0.9 %
5-10 SYRINGE (ML) INJECTION EVERY 8 HOURS
Status: DISCONTINUED | OUTPATIENT
Start: 2017-04-18 | End: 2017-04-24 | Stop reason: HOSPADM

## 2017-04-18 RX ORDER — ARFORMOTEROL TARTRATE 15 UG/2ML
15 SOLUTION RESPIRATORY (INHALATION)
Status: DISCONTINUED | OUTPATIENT
Start: 2017-04-18 | End: 2017-04-24 | Stop reason: HOSPADM

## 2017-04-18 RX ORDER — MORPHINE SULFATE 2 MG/ML
2 INJECTION, SOLUTION INTRAMUSCULAR; INTRAVENOUS
Status: DISCONTINUED | OUTPATIENT
Start: 2017-04-18 | End: 2017-04-19

## 2017-04-18 RX ORDER — MAGNESIUM SULFATE 100 %
4 CRYSTALS MISCELLANEOUS AS NEEDED
Status: DISCONTINUED | OUTPATIENT
Start: 2017-04-18 | End: 2017-04-24 | Stop reason: HOSPADM

## 2017-04-18 RX ORDER — INSULIN LISPRO 100 [IU]/ML
INJECTION, SOLUTION INTRAVENOUS; SUBCUTANEOUS
Status: DISCONTINUED | OUTPATIENT
Start: 2017-04-18 | End: 2017-04-24 | Stop reason: HOSPADM

## 2017-04-18 RX ORDER — IPRATROPIUM BROMIDE AND ALBUTEROL SULFATE 2.5; .5 MG/3ML; MG/3ML
3 SOLUTION RESPIRATORY (INHALATION) ONCE
Status: COMPLETED | OUTPATIENT
Start: 2017-04-18 | End: 2017-04-18

## 2017-04-18 RX ORDER — BUMETANIDE 1 MG/1
0.5 TABLET ORAL
Status: DISCONTINUED | OUTPATIENT
Start: 2017-04-18 | End: 2017-04-24 | Stop reason: HOSPADM

## 2017-04-18 RX ORDER — AMLODIPINE BESYLATE 10 MG/1
10 TABLET ORAL DAILY
Status: DISCONTINUED | OUTPATIENT
Start: 2017-04-18 | End: 2017-04-24 | Stop reason: HOSPADM

## 2017-04-18 RX ORDER — IPRATROPIUM BROMIDE AND ALBUTEROL SULFATE 2.5; .5 MG/3ML; MG/3ML
3 SOLUTION RESPIRATORY (INHALATION)
Status: DISCONTINUED | OUTPATIENT
Start: 2017-04-18 | End: 2017-04-23

## 2017-04-18 RX ORDER — HYDRALAZINE HYDROCHLORIDE 50 MG/1
50 TABLET, FILM COATED ORAL 3 TIMES DAILY
Status: DISCONTINUED | OUTPATIENT
Start: 2017-04-18 | End: 2017-04-24 | Stop reason: HOSPADM

## 2017-04-18 RX ORDER — ALBUTEROL SULFATE 0.83 MG/ML
2.5 SOLUTION RESPIRATORY (INHALATION)
Status: DISPENSED | OUTPATIENT
Start: 2017-04-18 | End: 2017-04-18

## 2017-04-18 RX ORDER — SODIUM CHLORIDE 0.9 % (FLUSH) 0.9 %
5-10 SYRINGE (ML) INJECTION AS NEEDED
Status: DISCONTINUED | OUTPATIENT
Start: 2017-04-18 | End: 2017-04-24 | Stop reason: HOSPADM

## 2017-04-18 RX ORDER — DEXTROSE 50 % IN WATER (D50W) INTRAVENOUS SYRINGE
25-50 AS NEEDED
Status: DISCONTINUED | OUTPATIENT
Start: 2017-04-18 | End: 2017-04-24 | Stop reason: HOSPADM

## 2017-04-18 RX ORDER — MORPHINE SULFATE 2 MG/ML
1 INJECTION, SOLUTION INTRAMUSCULAR; INTRAVENOUS ONCE
Status: COMPLETED | OUTPATIENT
Start: 2017-04-18 | End: 2017-04-18

## 2017-04-18 RX ORDER — FAMOTIDINE 20 MG/1
20 TABLET, FILM COATED ORAL DAILY
Status: DISCONTINUED | OUTPATIENT
Start: 2017-04-19 | End: 2017-04-24 | Stop reason: HOSPADM

## 2017-04-18 RX ORDER — MONTELUKAST SODIUM 10 MG/1
10 TABLET ORAL
Status: DISCONTINUED | OUTPATIENT
Start: 2017-04-18 | End: 2017-04-24 | Stop reason: HOSPADM

## 2017-04-18 RX ORDER — BUDESONIDE 0.5 MG/2ML
500 INHALANT ORAL 2 TIMES DAILY
Status: DISCONTINUED | OUTPATIENT
Start: 2017-04-18 | End: 2017-04-22

## 2017-04-18 RX ADMIN — IPRATROPIUM BROMIDE AND ALBUTEROL SULFATE 3 ML: 2.5; .5 SOLUTION RESPIRATORY (INHALATION) at 20:53

## 2017-04-18 RX ADMIN — Medication 10 ML: at 23:02

## 2017-04-18 RX ADMIN — ALBUTEROL SULFATE 2.5 MG: 2.5 SOLUTION RESPIRATORY (INHALATION) at 15:18

## 2017-04-18 RX ADMIN — ARFORMOTEROL TARTRATE 15 MCG: 15 SOLUTION RESPIRATORY (INHALATION) at 22:15

## 2017-04-18 RX ADMIN — HYDROMORPHONE HYDROCHLORIDE 1 MG: 1 INJECTION, SOLUTION INTRAMUSCULAR; INTRAVENOUS; SUBCUTANEOUS at 12:07

## 2017-04-18 RX ADMIN — Medication 10 ML: at 12:07

## 2017-04-18 RX ADMIN — Medication 2 MG: at 14:58

## 2017-04-18 RX ADMIN — MONTELUKAST SODIUM 10 MG: 10 TABLET, FILM COATED ORAL at 22:45

## 2017-04-18 RX ADMIN — BUDESONIDE 500 MCG: 0.5 INHALANT RESPIRATORY (INHALATION) at 19:26

## 2017-04-18 RX ADMIN — ONDANSETRON 4 MG: 2 INJECTION INTRAMUSCULAR; INTRAVENOUS at 12:07

## 2017-04-18 RX ADMIN — IPRATROPIUM BROMIDE AND ALBUTEROL SULFATE 3 ML: 2.5; .5 SOLUTION RESPIRATORY (INHALATION) at 13:12

## 2017-04-18 RX ADMIN — DOXYCYCLINE 100 MG: 100 INJECTION, POWDER, LYOPHILIZED, FOR SOLUTION INTRAVENOUS at 13:11

## 2017-04-18 RX ADMIN — METHYLPREDNISOLONE SODIUM SUCCINATE 60 MG: 40 INJECTION, POWDER, FOR SOLUTION INTRAMUSCULAR; INTRAVENOUS at 22:45

## 2017-04-18 RX ADMIN — LIDOCAINE HYDROCHLORIDE: 10 INJECTION, SOLUTION EPIDURAL; INFILTRATION; INTRACAUDAL; PERINEURAL at 17:00

## 2017-04-18 RX ADMIN — DOXYCYCLINE 100 MG: 100 INJECTION, POWDER, LYOPHILIZED, FOR SOLUTION INTRAVENOUS at 20:52

## 2017-04-18 RX ADMIN — HYDRALAZINE HYDROCHLORIDE 50 MG: 50 TABLET, FILM COATED ORAL at 16:31

## 2017-04-18 RX ADMIN — ALBUTEROL SULFATE 2.5 MG: 2.5 SOLUTION RESPIRATORY (INHALATION) at 12:21

## 2017-04-18 RX ADMIN — Medication 2 MG: at 19:44

## 2017-04-18 RX ADMIN — Medication 1 MG: at 15:24

## 2017-04-18 RX ADMIN — ENOXAPARIN SODIUM 40 MG: 40 INJECTION SUBCUTANEOUS at 13:11

## 2017-04-18 RX ADMIN — AMLODIPINE BESYLATE 10 MG: 10 TABLET ORAL at 13:11

## 2017-04-18 RX ADMIN — IPRATROPIUM BROMIDE AND ALBUTEROL SULFATE 3 ML: 2.5; .5 SOLUTION RESPIRATORY (INHALATION) at 16:31

## 2017-04-18 RX ADMIN — IPRATROPIUM BROMIDE AND ALBUTEROL SULFATE 3 ML: .5; 3 SOLUTION RESPIRATORY (INHALATION) at 10:42

## 2017-04-18 RX ADMIN — Medication 10 ML: at 14:00

## 2017-04-18 RX ADMIN — BUMETANIDE 0.5 MG: 1 TABLET ORAL at 14:13

## 2017-04-18 RX ADMIN — LIDOCAINE HYDROCHLORIDE: 10 INJECTION, SOLUTION EPIDURAL; INFILTRATION; INTRACAUDAL; PERINEURAL at 16:58

## 2017-04-18 RX ADMIN — LIDOCAINE HYDROCHLORIDE: 10 INJECTION, SOLUTION EPIDURAL; INFILTRATION; INTRACAUDAL; PERINEURAL at 15:44

## 2017-04-18 RX ADMIN — INSULIN LISPRO 2 UNITS: 100 INJECTION, SOLUTION INTRAVENOUS; SUBCUTANEOUS at 23:00

## 2017-04-18 RX ADMIN — HYDRALAZINE HYDROCHLORIDE 50 MG: 50 TABLET, FILM COATED ORAL at 22:46

## 2017-04-18 RX ADMIN — HYDRALAZINE HYDROCHLORIDE 50 MG: 50 TABLET, FILM COATED ORAL at 13:11

## 2017-04-18 RX ADMIN — LIDOCAINE HYDROCHLORIDE: 10 INJECTION, SOLUTION EPIDURAL; INFILTRATION; INTRACAUDAL; PERINEURAL at 14:13

## 2017-04-18 RX ADMIN — METHYLPREDNISOLONE SODIUM SUCCINATE 60 MG: 40 INJECTION, POWDER, FOR SOLUTION INTRAMUSCULAR; INTRAVENOUS at 14:18

## 2017-04-18 NOTE — ED TRIAGE NOTES
resp distress, by ems, pulled iv out on arrival,   Given 1 alb, 1 duo, 125 solumdrol, 2 gm mag by ems

## 2017-04-18 NOTE — H&P
History and Physical        Patient: Sherren Canter               Sex: female          DOA: 2017         YOB: 1965      Age:  45 y.o.        LOS:  LOS: 0 days        HPI:     Sherren Canter is a 45 y.o. female who have a history of hypertension, COPD, asthma who presents to ED via EMS complaining of severe shortness of breath. She was transported to [facility] at 6:30 PM yesterday for respiratory distress. She states that her sx did not improve and she was discharged at 1 AM. EMS was called to the house this morning and found her in severe distress, retractions, speaking in 1-2 word sentences. She was given a COMBI treatment, albuterol treatment, magnesium, and solumedrol. She reports history of prior intubation 1 month ago. Patient was placed on Bipap and is feeling somewhat better. She states she has been compliant with meds, but not with CPAP at home. No known drug allergies. No other complaints, associated symptoms or modifying factors at this time. PMH:   1. COPD. 2. Severe asthma. 3. Obesity. 4. Chronic use of steroids. 5. Hypertension. 6. Chronic constipation. 7. Liver lesions seen on CT, which was felt to be indeterminate on Followup  CT with repeat imaging recommended in 6 months. 8. Secondhand smoke exposure. 9. Steroid-induced hyperglycemia. 10. Multi-nodular goiter. 6. Former tobacco.  12. History of ESBL urinary tract infection. Surgical history:   1. Hemorrhoidectomy. 2. . 3. Abdominal surgery. 4. Placement of what sounds like an IUD for her heavy periods. This was  done on 2015 at [facility]. 5. Packed red blood cell transfusion. History reviewed. No pertinent family history. Notable for hypertension and 2 sisters with chronic  asthma.     Social History     Social History    Marital status:      Spouse name: N/A    Number of children: N/A    Years of education: N/A     Social History Main Topics    Smoking status: Never Smoker    Smokeless tobacco: Never Used    Alcohol use No      Comment: socially    Drug use: No    Sexual activity: No      Comment: last used 9 years ago     Other Topics Concern    None     Social History Narrative       Prior to Admission medications    Medication Sig Start Date End Date Taking? Authorizing Provider   albuterol (PROVENTIL HFA, VENTOLIN HFA, PROAIR HFA) 90 mcg/actuation inhaler Take 1 Puff by inhalation every four (4) hours as needed for Wheezing. 4/9/17   Perla Murdock MD   albuterol (PROVENTIL VENTOLIN) 2.5 mg /3 mL (0.083 %) nebulizer solution 3 mL by Nebulization route every four (4) hours as needed for Wheezing or Shortness of Breath. 3/12/17   Sheryl Mathews MD   oxyCODONE-acetaminophen (PERCOCET 10)  mg per tablet Take 1 Tab by mouth every eight (8) hours as needed. Max Daily Amount: 3 Tabs. 3/12/17   Sheryl Mathews MD   OTHER Check CBC, CMP, Mg in 5 days, results to PCP immediately, Diagnosis- COPD 3/12/17   Sheryl Mathews MD   OTHER Resume Home Oxygen 3/12/17   Sheryl Mathews MD   OTHER Resume CPAP QHS 3/12/17   Sheryl Mathews MD   ALPRAZolam Katt Sandoval) 1 mg tablet Take 1 Tab by mouth two (2) times a day. Max Daily Amount: 2 mg. 3/9/17   Emeterio Falk MD   amLODIPine (NORVASC) 10 mg tablet Take 1 Tab by mouth daily. 3/9/17   Emeterio Falk MD   budesonide-formoterol [facility]) 160-4.5 mcg/actuation HFA inhaler Take 2 Puffs by inhalation two (2) times a day. 3/9/17   Emeterio Falk MD   bumetanide (BUMEX) 0.5 mg tablet Take 1 Tab by mouth every fourty-eight (48) hours. 3/9/17   Emeterio Falk MD   famotidine (PEPCID) 20 mg tablet Take 1 Tab by mouth daily. 3/9/17   Emeterio Falk MD   FLUoxetine (PROZAC) 20 mg capsule Take 3 Caps by mouth daily. 3/9/17   Emeterio Falk MD   montelukast (SINGULAIR) 10 mg tablet Take 1 Tab by mouth nightly. 3/9/17   Emeterio Falk MD   tiotropium [facility]) 18 mcg inhalation capsule Take 1 Cap by inhalation daily.  3/9/17   Emeterio Falk MD arformoterol (BROVANA) 15 mcg/2 mL nebu neb solution 2 mL by Nebulization route two (2) times a day. 3/9/17   Shannon Boswell MD   budesonide (PULMICORT) 0.5 mg/2 mL nbsp 2 mL by Nebulization route two (2) times a day. 3/9/17   Shannon Boswell MD   hydrALAZINE (APRESOLINE) 50 mg tablet Take 1 Tab by mouth three (3) times daily.  3/9/17   Shannon Boswell MD       No Known Allergies    Review of Systems  CONSTITUTIONAL: No Fever, No chills, No weight loss, No Night sweats  HEENT: No nasal discharge, No PN drainage, No epistaxis, No diff in swallowing  CVS: No chest pain, No palpitations, No syncope, No peripheral edema, No PND, No orthopnea  RS:  No hemoptysis, No pleuritic chest pain  GI: No abd pain, No vomitting, No diarrhea, No hematemesis, No rectal bleeding, No acid reflux or heartburn  NEURO: No focal weakness, No headaches, No seizures  PSYCH: Chronic anxiety and depression  MUSCULOSKLETAL: No joint pain or swelling  : No hematuria or dysuria  SKIN: No rash        Physical Exam:      Visit Vitals    /89    Pulse 86    Temp 97.6 °F (36.4 °C)    Resp 23    Wt 113.4 kg (250 lb)    SpO2 96%    BMI 45.73 kg/m2     No intake or output data in the 24 hours ending 04/18/17 1247    Physical Exam:  General: comfortable, on bipap, morbidly obese  Neck: No adenopathy or thyroid swelling  CVS: No significant JVD (Jugular Venous Distention) observed, PMI (Point of Maximum Impulse) not displaced, regular rate and rhythm, S1S2 normal, no murmurs  RS: Symmetrical chest rise, B wheezes and rhonchi  Abd: No distention observed, bowel sounds normal, palpation is soft, non tender, no hepatosplenomegaly, no distension or guarding or rigidity  Neuro: non focal, awake, alert  Extrm: B leg edema, similar to previous exams  Skin: no rash    Labs Reviewed:    Chemistry Recent Labs      04/18/17   1044   GLU  103*   NA  141   K  3.2*   CL  105   CO2  29   BUN  9   CREA  1.11   CA  9.0   AGAP  7   BUCR  8*   AP  125*   TP  7.4 ALB  3.3*   GLOB  4.1*   AGRAT  0.8        Lactic Acid Lactic acid   Date Value Ref Range Status   01/13/2017 1.4 0.4 - 2.0 MMOL/L Final     No results for input(s): LAC in the last 72 hours. Liver Enzymes Protein, total   Date Value Ref Range Status   04/18/2017 7.4 6.4 - 8.2 g/dL Final     Albumin   Date Value Ref Range Status   04/18/2017 3.3 (L) 3.4 - 5.0 g/dL Final     Globulin   Date Value Ref Range Status   04/18/2017 4.1 (H) 2.0 - 4.0 g/dL Final     A-G Ratio   Date Value Ref Range Status   04/18/2017 0.8 0.8 - 1.7   Final     AST (SGOT)   Date Value Ref Range Status   04/18/2017 14 (L) 15 - 37 U/L Final     Alk.  phosphatase   Date Value Ref Range Status   04/18/2017 125 (H) 45 - 117 U/L Final     Recent Labs      04/18/17   1044   TP  7.4   ALB  3.3*   GLOB  4.1*   AGRAT  0.8   SGOT  14*   AP  125*        CBC w/Diff Recent Labs      04/18/17   1044   WBC  7.8   RBC  4.81   HGB  13.1   HCT  41.7   PLT  201   GRANS  64   LYMPH  28   EOS  2        Cardiac Enzymes Lab Results   Component Value Date/Time    CPK 55 04/18/2017 10:44 AM    CKMB <1.0 04/18/2017 10:44 AM    CKND1 Cannot be calulated 04/18/2017 10:44 AM    TROIQ <0.02 04/18/2017 10:44 AM        BNP Lab Results   Component Value Date/Time    BNP 19.9 12/07/2015 05:23 PM    BNP 3.4 11/01/2015 03:20 PM        Coagulation Recent Labs      04/18/17   1044   PTP  13.7   INR  1.1   APTT  27.8             ABG Recent Labs      04/18/17   1218   PHI  7.332*   PCO2I  47.9*   PO2I  75*   HCO3I  25.4   FIO2I  30        Urinalysis Lab Results   Component Value Date/Time    Color YELLOW 01/11/2017 06:24 PM    Appearance CLEAR 01/11/2017 06:24 PM    Specific gravity 1.013 01/11/2017 06:24 PM    pH (UA) 6.5 01/11/2017 06:24 PM    Protein TRACE 01/11/2017 06:24 PM    Glucose NEGATIVE  01/11/2017 06:24 PM    Ketone NEGATIVE  01/11/2017 06:24 PM    Bilirubin NEGATIVE  01/11/2017 06:24 PM    Urobilinogen 0.2 01/11/2017 06:24 PM    Nitrites NEGATIVE  01/11/2017 06:24 PM    Leukocyte Esterase NEGATIVE  01/11/2017 06:24 PM    Epithelial cells FEW 01/11/2017 06:24 PM    Bacteria FEW 01/11/2017 06:24 PM    WBC NONE 01/11/2017 06:24 PM    RBC NONE 01/11/2017 06:24 PM        Micro  No results for input(s): SDES, CULT in the last 72 hours. No results for input(s): CULT in the last 72 hours. XR (Most Recent). CXR reviewed by me and compared with previous CXR   Results from Hospital Encounter encounter on 04/18/17   XR CHEST PORT   Narrative PROCEDURE:  Chest Frontal and Lateral.    CPT code 98692. INDICATION:  Shortness of breath. COMPARISON:  4/9/17. FINDINGS:  Frontal and lateral views of the chest demonstrate clear lungs  bilaterally. No lung nodules are detected. No pleural effusion is identified. The mediastinum and hilar regions are unremarkable. The cardiac silhouette is  within normal limits. Previously observed mild prominence of the vascular  markings is decreased. Impression IMPRESSION:    No acute findings. CT (Most Recent)   Results from Hospital Encounter encounter on 01/26/17   CTA CHEST W WO CONT   Narrative CT chest with contrast for PE    CPT CODE: 09544     HISTORY: Dyspnea, Sudden onset. Recent hospitalized for COPD exacerbation    COMPARISON: None. TECHNIQUE: Dynamic spiral scan through the chest is obtained from the thoracic  inlet to the diaphragm after dynamic nonionic IV contrast administration  per PE  protocol. Coronal and sagittal MIP computer reconstructions are also obtained  for better visualization of the integrity of pulmonary arteries in 3D dimension,  particularly for lobar/interlobar arterial branches and to minimize radiation  dose.      All CT scans at this facility performed using dose optimization techniques as  appreciated to a performed exam, to include automated exposure control,  adjustment of the mA and or KU according to patient size (including appropriate  matching for site specific examination), or use of iterative reconstruction  technique. CONTRAST: 100 cc Isovue 370. FINDINGS:    PULMONARY ARTERIES: There is near nondiagnostic study for PE due to poor  contrast bolus injection. Moderate motion artifact also compromises the study. There is no definite evidence of intraluminal filling defects in mainstem  pulmonary arteries. The lobar and smaller branches are poorly opacified by  contrast for evaluation. No anterior dilatation seen. AORTA AND THE GREAT VESSELS: Unremarkable. LUNG PARENCHYMA: Respiratory motion artifact noted. Mild atelectatic changes  noted. No definite pulmonary infarction or consolidation identified. IMAGED THYROID: Heterogeneous appearing thyroid with likely multiple hypodense  lesions noted. Diffuse enlargement of thyroid appears similar to the prior  noncontrast CT. MEDIASTINUM: No adenopathy or mass. HEART: Borderline heart. No pericardial effusion. PLEURAL SPACES AND CHEST WALL: No significant pleural pathology. VISUALIZED UPPER ABDOMEN: The benign hepatic hypodense lesions in the left lobe  appears stable and measures 1.6 x 1.5 cm in segment 2 and the second one  measures 9 x 11 mm also in segment 2.    OSSEOUS STRUCTURES: Unremarkable. Impression IMPRESSION:    1. Near nondiagnostic study for PE secondary to poor contrast bolus as above. No definite PE in mainstem pulmonary arteries. The lobar and smaller branches  are poorly seen for evaluation. If clinical concern of PE remains high, VQ scan  is advised. 2.  Mild atelectatic changes of the lungs. No pulmonary consolidation or  infarction seen. 3. Diffuse thyroid enlargement again noted. Multiple hypodense lesions  throughout both lobes and warrant thyroid ultrasound for better evaluation. 4. Stable hepatic hypodense lesions. Thank you for your referral.           EKG No results found for this or any previous visit.      ECHO No results found for this or any previous visit. PFT No flowsheet data found. Assessment/Plan     1) COPD with exacerbation:  Patient has been seen multiple times in multiple ER's for severe SOB. ABG obtained 2 hours after patient started on Bipap as patient was a difficult stick. Will admit for 23hr obs on Stepdown. Keep on Bipap, continue solumedrol, duonebs, and start doxycycline. 2) Obesity: complicating patient's breathing issues (possible obesity hypoventilation syndrome). Pulmonary consulted and will see patient. 3) MY on CPAP: continue nightly CPAP once patient is stable enough off of Bipap. 4) noncompliance: the importance of compliance with medications and CPAP discussed with patient. 5) HTN: continue home medications. 6) Hypokalemia: replace and recheck. Most likely due to Bumex (chronic diuretic use). 7) Steroid induced hypoglycemia: POC glucose checks and correction insulin ordered. 8) Code status:  Full code per patient    9) DVT prophylaxis: Patient at high risk for DVT per [facility] risk calculation. Lovenox ordered.

## 2017-04-18 NOTE — CONSULTS
[facility]  Pulmonary, Critical Care, and Sleep Medicine    Initial Patient Consult    Name: Melida Nguyen MRN: [id number]   : 1965 Hospital: [address] [place] [address]   Date: 2017        IMPRESSION:   · Acute/chronic hypoxemic respiratory failure with acute hypercarbia in the setting of COPD/asthma with exacerbation   · COPD with exacerbation/appears to be chronic bronchitis   · Severe asthma  · Chronic hypoxemia on home o2- 2L  · MY on CPAP- noncompliant   · HTN  · Obesity- BMI 45  · Hyperglycemia in the setting of steroid use    Hx of vocal cord dysfunction   Hx of intubation and frequent admissions for respiratory failure  Hx of cocaine and heroin abuse  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 Patient will need to stay on BiPAP PRN and HS. HOB > 30 degrees. Will leave NPO for now. Stop Spiriva since patien is on duo nebs. Will continue duonebs q4; Add brovana;continue pulmicort; agree with IV solumdrol. CXR nil acute. When off bipap will encourage incentive spirometry and PEP therapy   · Diuretics have been ordered by the primary team. Patient does not appear to be in CHF with normal BNP and no acute finding on CXR   · Check Rapid flu and sputum culture. Patient has been started on Doxycycline by the primary team   · Check UDS  · Check urine pregnancy      Subjective: This patient has been seen and evaluated at the request of Dr. Lui Sommer for COPD/asthma exacerbation. Patient is a 45 y.o. female pmhx of severe asthma, vocal cord dysfunction, COPD, HTN, polysubstance abuse, and chronic hypoxemia. She is has MY, but is noncompliant with her CPAP. Patient is actually well known to our service and has been frequently admitted this year for respiratory failure. She was intubated back in January of this year. It appears patient initially started having SOB, wheezing, cough, sputum, chills, orthopnea, and PND about a week ago.  She went to [facility] last night for respiratory distress and was discharged home. Patient states she felt she never felt better prior to being sent home. EMS was called to the house this morning and found her in severe distress, retractions, speaking in 1-2 word sentences. She was given a COMBI treatment, albuterol treatment, magnesium, and solumedrol. In ED patient was placed on BiPAP with subsequent improvement in her respiratory distress and hypoxemia. ABG on BiPAP 7.32/PCO2 47/ PO2 75/ HCO3 25. On exam patient is able to speak in complete sentences without distress. SPO2 is > 92%      Past Medical History:   Diagnosis Date    Asthma     Chronic obstructive pulmonary disease (HCC)     Endocrine disease     thyroid issues    Gastrointestinal disorder     \"blockage in my stomach\"    Hypertension       History reviewed. No pertinent surgical history. Prior to Admission medications    Medication Sig Start Date End Date Taking? Authorizing Provider   albuterol (PROVENTIL HFA, VENTOLIN HFA, PROAIR HFA) 90 mcg/actuation inhaler Take 1 Puff by inhalation every four (4) hours as needed for Wheezing. 4/9/17   Ben Daniel MD   albuterol (PROVENTIL VENTOLIN) 2.5 mg /3 mL (0.083 %) nebulizer solution 3 mL by Nebulization route every four (4) hours as needed for Wheezing or Shortness of Breath. 3/12/17   Lorel Soulier, MD   oxyCODONE-acetaminophen (PERCOCET 10)  mg per tablet Take 1 Tab by mouth every eight (8) hours as needed. Max Daily Amount: 3 Tabs. 3/12/17   Lorel Soulier, MD   OTHER Check CBC, CMP, Mg in 5 days, results to PCP immediately, Diagnosis- COPD 3/12/17   Lorel Soulier, MD   OTHER Resume Home Oxygen 3/12/17   Lorel Soulier, MD   OTHER Resume CPAP QHS 3/12/17   Lorel Soulier, MD   ALPRAZolam Mata Martin) 1 mg tablet Take 1 Tab by mouth two (2) times a day. Max Daily Amount: 2 mg. 3/9/17   Barrington Do MD   amLODIPine (NORVASC) 10 mg tablet Take 1 Tab by mouth daily.  3/9/17   Barrington Do MD budesonide-formoterol (SYMBICORT) 160-4.5 mcg/actuation HFA inhaler Take 2 Puffs by inhalation two (2) times a day. 3/9/17   Mike Munoz MD   bumetanide (BUMEX) 0.5 mg tablet Take 1 Tab by mouth every fourty-eight (48) hours. 3/9/17   Mike Munoz MD   famotidine (PEPCID) 20 mg tablet Take 1 Tab by mouth daily. 3/9/17   Mike Munoz MD   FLUoxetine (PROZAC) 20 mg capsule Take 3 Caps by mouth daily. 3/9/17   Mike Munoz MD   montelukast (SINGULAIR) 10 mg tablet Take 1 Tab by mouth nightly. 3/9/17   Mike Munoz MD   tiotropium [facility]) 18 mcg inhalation capsule Take 1 Cap by inhalation daily. 3/9/17   Mike Munoz MD   arformoterol (BROVANA) 15 mcg/2 mL nebu neb solution 2 mL by Nebulization route two (2) times a day. 3/9/17   Mike Munoz MD   budesonide (PULMICORT) 0.5 mg/2 mL nbsp 2 mL by Nebulization route two (2) times a day. 3/9/17   Mike Munoz MD   hydrALAZINE (APRESOLINE) 50 mg tablet Take 1 Tab by mouth three (3) times daily. 3/9/17   Mike Munoz MD     No Known Allergies   Social History   Substance Use Topics    Smoking status: Never Smoker    Smokeless tobacco: Never Used    Alcohol use No      Comment: socially      History reviewed. No pertinent family history.      Current Facility-Administered Medications   Medication Dose Route Frequency    sodium chloride (NS) flush 5-10 mL  5-10 mL IntraVENous Q8H    amLODIPine (NORVASC) tablet 10 mg  10 mg Oral DAILY    budesonide (PULMICORT) 500 mcg/2 ml nebulizer suspension  500 mcg Nebulization BID    bumetanide (BUMEX) tablet 0.5 mg  0.5 mg Oral Q48H    [START ON 4/19/2017] famotidine (PEPCID) tablet 20 mg  20 mg Oral DAILY    [START ON 4/19/2017] FLUoxetine (PROzac) capsule 60 mg  60 mg Oral DAILY    hydrALAZINE (APRESOLINE) tablet 50 mg  50 mg Oral TID    montelukast (SINGULAIR) tablet 10 mg  10 mg Oral QHS    [START ON 4/19/2017] tiotropium (SPIRIVA) inhalation capsule 18 mcg  1 Cap Inhalation DAILY    enoxaparin (LOVENOX) injection 40 mg  40 mg SubCUTAneous Q24H    methylPREDNISolone (PF) (SOLU-MEDROL) injection 60 mg  60 mg IntraVENous Q8H    doxycycline (VIBRAMYCIN) 100 mg in 0.9% sodium chloride (MBP/ADV) 100 mL MBP  100 mg IntraVENous Q12H    potassium chloride 10 mEq, lidocaine (PF) (XYLOCAINE) 10 mg/mL (1 %) 1 mL in 0.9% sodium chloride 100 mL IVPB   IntraVENous Q1H    albuterol-ipratropium (DUO-NEB) 2.5 MG-0.5 MG/3 ML  3 mL Nebulization Q4H RT    insulin lispro (HUMALOG) injection   SubCUTAneous AC&HS       Review of Systems:  Constitutional: positive for fevers, chills, fatigue and malaise  Eyes: negative  Ears, nose, mouth, throat, and face: negative  Respiratory: positive for cough, sputum, wheezing or dyspnea on exertion  Cardiovascular: positive for orthopnea, paroxysmal nocturnal dyspnea, lower extremity edema, dyspnea on exertion  Gastrointestinal: positive for nausea and diarrhea  Genitourinary:negative  Integument/breast: negative  Hematologic/lymphatic: negative  Musculoskeletal:negative  Neurological: negative  Behavioral/Psych: negative  Endocrine: negative    Objective:   Vital Signs:    Visit Vitals    /89    Pulse 86    Temp 97.6 °F (36.4 °C)    Resp 23    Wt 113.4 kg (250 lb)    SpO2 96%    BMI 45.73 kg/m2       O2 Device: BIPAP   O2 Flow Rate (L/min): 4 l/min   Temp (24hrs), Av.6 °F (36.4 °C), Min:97.6 °F (36.4 °C), Max:97.6 °F (36.4 °C)       Intake/Output:   Last shift:         Last 3 shifts:    No intake or output data in the 24 hours ending 17 1306   Physical Exam:   General:  Alert, cooperative, no distress, appears stated age. On BiPAP, obese    Head:  Normocephalic, without obvious abnormality, atraumatic. Eyes:  Conjunctivae/corneas clear. PERRL,    Nose: Nares normal. Septum midline. Mucosa normal. No drainage or sinus tenderness.    Throat: Lips, mucosa, and tongue normal.    Neck: Supple, symmetrical, trachea midline, no adenopathy, thyroid: no enlargment/tenderness/nodules, no carotid bruit and no JVD. Back:   Symmetric, no curvature. ROM normal.   Lungs:   BBS with MOD AE, scattered wheezing throughout lung fields, Diminished bibasilar    Chest wall:  No tenderness or deformity. Heart:  Regular rate and rhythm, S1, S2 normal, no murmur, click, rub or gallop. Abdomen:   Soft, non-tender. Bowel sounds normal. No masses,  No organomegaly. Extremities: Extremities normal, atraumatic, no cyanosis. Nonpitting pretibial edema    Pulses: 2+ and symmetric all extremities.    Skin: Skin color, texture, turgor normal. No rashes or lesions   Lymph nodes: Cervical, supraclavicular, and axillary nodes normal.   Neurologic: Grossly nonfocal     Data review:     Recent Results (from the past 24 hour(s))   PTT    Collection Time: 04/18/17 10:44 AM   Result Value Ref Range    aPTT 27.8 23.0 - 36.4 SEC   PROTHROMBIN TIME + INR    Collection Time: 04/18/17 10:44 AM   Result Value Ref Range    Prothrombin time 13.7 11.5 - 15.2 sec    INR 1.1 0.8 - 1.2     PRO-BNP    Collection Time: 04/18/17 10:44 AM   Result Value Ref Range    NT pro- 0 - 900 PG/ML   TSH 3RD GENERATION    Collection Time: 04/18/17 10:44 AM   Result Value Ref Range    TSH 0.07 (L) 0.36 - 9.98 uIU/mL   METABOLIC PANEL, BASIC    Collection Time: 04/18/17 10:44 AM   Result Value Ref Range    Sodium 141 136 - 145 mmol/L    Potassium 3.2 (L) 3.5 - 5.5 mmol/L    Chloride 105 100 - 108 mmol/L    CO2 29 21 - 32 mmol/L    Anion gap 7 3.0 - 18 mmol/L    Glucose 103 (H) 74 - 99 mg/dL    BUN 9 7.0 - 18 MG/DL    Creatinine 1.11 0.6 - 1.3 MG/DL    BUN/Creatinine ratio 8 (L) 12 - 20      GFR est AA >60 >60 ml/min/1.73m2    GFR est non-AA 52 (L) >60 ml/min/1.73m2    Calcium 9.0 8.5 - 10.1 MG/DL   CARDIAC PANEL,(CK, CKMB & TROPONIN)    Collection Time: 04/18/17 10:44 AM   Result Value Ref Range    CK 55 26 - 192 U/L    CK - MB <1.0 <3.6 ng/ml    CK-MB Index Cannot be calulated 0.0 - 4.0 %    Troponin-I, Qt. <0.02 0.0 - 0.045 NG/ML   HEPATIC FUNCTION PANEL    Collection Time: 04/18/17 10:44 AM   Result Value Ref Range    Protein, total 7.4 6.4 - 8.2 g/dL    Albumin 3.3 (L) 3.4 - 5.0 g/dL    Globulin 4.1 (H) 2.0 - 4.0 g/dL    A-G Ratio 0.8 0.8 - 1.7      Bilirubin, total 0.9 0.2 - 1.0 MG/DL    Bilirubin, direct 0.2 0.0 - 0.2 MG/DL    Alk. phosphatase 125 (H) 45 - 117 U/L    AST (SGOT) 14 (L) 15 - 37 U/L    ALT (SGPT) 24 13 - 56 U/L   CBC WITH AUTOMATED DIFF    Collection Time: 04/18/17 10:44 AM   Result Value Ref Range    WBC 7.8 4.6 - 13.2 K/uL    RBC 4.81 4.20 - 5.30 M/uL    HGB 13.1 12.0 - 16.0 g/dL    HCT 41.7 35.0 - 45.0 %    MCV 86.7 74.0 - 97.0 FL    MCH 27.2 24.0 - 34.0 PG    MCHC 31.4 31.0 - 37.0 g/dL    RDW 16.0 (H) 11.6 - 14.5 %    PLATELET 284 458 - 888 K/uL    MPV 11.7 9.2 - 11.8 FL    NEUTROPHILS 64 40 - 73 %    LYMPHOCYTES 28 21 - 52 %    MONOCYTES 6 3 - 10 %    EOSINOPHILS 2 0 - 5 %    BASOPHILS 0 0 - 2 %    ABS. NEUTROPHILS 5.0 1.8 - 8.0 K/UL    ABS. LYMPHOCYTES 2.2 0.9 - 3.6 K/UL    ABS. MONOCYTES 0.5 0.05 - 1.2 K/UL    ABS. EOSINOPHILS 0.2 0.0 - 0.4 K/UL    ABS.  BASOPHILS 0.0 0.0 - 0.1 K/UL    DF AUTOMATED     EKG, 12 LEAD, INITIAL    Collection Time: 04/18/17 12:14 PM   Result Value Ref Range    Ventricular Rate 83 BPM    Atrial Rate 83 BPM    P-R Interval 134 ms    QRS Duration 76 ms    Q-T Interval 396 ms    QTC Calculation (Bezet) 465 ms    Calculated P Axis 68 degrees    Calculated R Axis 49 degrees    Calculated T Axis 71 degrees    Diagnosis       Normal sinus rhythm  Nonspecific T wave abnormality  Prolonged QT  Abnormal ECG    Confirmed by Argenis Contreras MD, Lise Klinefelter ([id number]) on 4/18/2017 1:01:31 PM     POC G3    Collection Time: 04/18/17 12:18 PM   Result Value Ref Range    Device: BIPAP      FIO2 (POC) 30 %    pH (POC) 7.332 (L) 7.35 - 7.45      pCO2 (POC) 47.9 (H) 35.0 - 45.0 MMHG    pO2 (POC) 75 (L) 80 - 100 MMHG    HCO3 (POC) 25.4 22 - 26 MMOL/L    sO2 (POC) 94 92 - 97 %    Base deficit (POC) 1 mmol/L    PEEP/CPAP (POC) 6 cmH2O    Pressure support 10 cmH2O    Allens test (POC) YES      Total resp. rate 26      Site LEFT RADIAL      Specimen type (POC) ARTERIAL      Performed by Kendell Wilson timed YES         Imaging:  I have personally reviewed the patients radiographs and have reviewed the reports:  CXR Results  (Last 48 hours)               04/18/17 1100  XR CHEST PORT Final result    Impression:  IMPRESSION:       No acute findings. Narrative:  PROCEDURE:  Chest Frontal and Lateral.       CPT code 67144. INDICATION:  Shortness of breath. COMPARISON:  4/9/17. FINDINGS:  Frontal and lateral views of the chest demonstrate clear lungs   bilaterally. No lung nodules are detected. No pleural effusion is identified. The mediastinum and hilar regions are unremarkable. The cardiac silhouette is   within normal limits. Previously observed mild prominence of the vascular   markings is decreased.                               Allan Lowe, NP

## 2017-04-18 NOTE — IP AVS SNAPSHOT
[address] Patient: Wolf Maciel MRN: [id number] :1965 You are allergic to the following No active allergies Recent Documentation Weight BMI Smoking Status 109.3 kg 44.08 kg/m2 Never Smoker Emergency Contacts Name Discharge Info Relation Home Work Mobile Monica Denis  Daughter [21] [phone] Ephraim Lamb  Daughter [21] [phone] Warden Jordan [5] [phone] About your hospitalization You were admitted on:  2017 You last received care in the:  [facility] [id number] [place] [address] You were discharged on:  2017 Unit phone number:  [phone] Why you were hospitalized Your primary diagnosis was:  Not on File Your diagnoses also included:  Acute Asthma Exacerbation Providers Seen During Your Hospitalizations Provider Role Specialty Primary office phone Nivia Crowe DO Attending Provider Emergency Medicine [phone] Mima Hampton MD Attending Provider Internal Medicine [phone] Your Primary Care Physician (PCP) Primary Care Physician Office Phone Office Fax OTHER, PHYS ** None ** ** None ** Follow-up Information Follow up With Details Comments Contact Info Shalini Knox MD   Patient can only remember the practice name and not the physician Current Discharge Medication List  
  
CONTINUE these medications which have NOT CHANGED Dose & Instructions Dispensing Information Comments Morning Noon Evening Bedtime * albuterol 2.5 mg /3 mL (0.083 %) nebulizer solution Commonly known as:  PROVENTIL VENTOLIN Your last dose was: Your next dose is:    
   
   
 Dose:  2.5 mg  
3 mL by Nebulization route every four (4) hours as needed for Wheezing or Shortness of Breath. Quantity:  100 Each Refills:  1  
     
   
   
   
  
 * albuterol 90 mcg/actuation inhaler Commonly known as:  PROVENTIL HFA, VENTOLIN HFA, PROAIR HFA Your last dose was: Your next dose is:    
   
   
 Dose:  1 Puff Take 1 Puff by inhalation every four (4) hours as needed for Wheezing. Quantity:  1 Inhaler Refills:  0 ALPRAZolam 1 mg tablet Commonly known as:  Arlene Cruzry Your last dose was: Your next dose is:    
   
   
 Dose:  1 mg Take 1 Tab by mouth two (2) times a day. Max Daily Amount: 2 mg. Quantity:  20 Tab Refills:  0  
     
   
   
   
  
 amLODIPine 10 mg tablet Commonly known as:  Wing Carpenter Your last dose was: Your next dose is:    
   
   
 Dose:  10 mg Take 1 Tab by mouth daily. Quantity:  30 Tab Refills:  1  
     
   
   
   
  
 arformoterol 15 mcg/2 mL Nebu neb solution Commonly known as:  Masha Booth Your last dose was: Your next dose is:    
   
   
 Dose:  15 mcg  
2 mL by Nebulization route two (2) times a day. Quantity:  60 Vial  
Refills:  1  
     
   
   
   
  
 budesonide 0.5 mg/2 mL Nbsp Commonly known as:  PULMICORT Your last dose was: Your next dose is:    
   
   
 Dose:  500 mcg 2 mL by Nebulization route two (2) times a day. Quantity:  60 Each Refills:  1  
     
   
   
   
  
 budesonide-formoterol 160-4.5 mcg/actuation HFA inhaler Commonly known as:  SYMBICORT Your last dose was: Your next dose is:    
   
   
 Dose:  2 Puff Take 2 Puffs by inhalation two (2) times a day. Quantity:  1 Inhaler Refills:  1  
     
   
   
   
  
 bumetanide 0.5 mg tablet Commonly known as:  Velton Sandifer Your last dose was: Your next dose is:    
   
   
 Dose:  0.5 mg Take 1 Tab by mouth every fourty-eight (48) hours. Quantity:  30 Tab Refills:  1  
     
   
   
   
  
 famotidine 20 mg tablet Commonly known as:  PEPCID Your last dose was: Your next dose is:    
   
   
 Dose:  20 mg Take 1 Tab by mouth daily. Quantity:  30 Tab Refills:  1 FLUoxetine 20 mg capsule Commonly known as:  PROzac Your last dose was: Your next dose is:    
   
   
 Dose:  60 mg Take 3 Caps by mouth daily. Quantity:  90 Cap Refills:  1  
     
   
   
   
  
 hydrALAZINE 50 mg tablet Commonly known as:  APRESOLINE Your last dose was: Your next dose is:    
   
   
 Dose:  50 mg Take 1 Tab by mouth three (3) times daily. Quantity:  90 Tab Refills:  1  
     
   
   
   
  
 montelukast 10 mg tablet Commonly known as:  SINGULAIR Your last dose was: Your next dose is:    
   
   
 Dose:  10 mg Take 1 Tab by mouth nightly. Quantity:  30 Tab Refills:  1  
     
   
   
   
  
 oxyCODONE-acetaminophen  mg per tablet Commonly known as:  PERCOCET 10 Your last dose was: Your next dose is:    
   
   
 Dose:  1 Tab Take 1 Tab by mouth every eight (8) hours as needed. Max Daily Amount: 3 Tabs. Quantity:  20 Tab Refills:  0  
     
   
   
   
  
 tiotropium 18 mcg inhalation capsule Commonly known as:  Josephus Hence Your last dose was: Your next dose is:    
   
   
 Dose:  1 Cap Take 1 Cap by inhalation daily. Quantity:  30 Cap Refills:  1  
     
   
   
   
  
 * Notice: This list has 2 medication(s) that are the same as other medications prescribed for you. Read the directions carefully, and ask your doctor or other care provider to review them with you. STOP taking these medications OTHER Where to Get Your Medications Information on where to get these meds will be given to you by the nurse or doctor. ! Ask your nurse or doctor about these medications ALPRAZolam 1 mg tablet  
 oxyCODONE-acetaminophen  mg per tablet Discharge Instructions None Discharge Orders None Introducing [facility]! 3 [facility] introduces [facility] patient portal. Now you can access parts of your medical record, email your doctor's office, and request medication refills online. 1. In your internet browser, go to [email or web address] 2. Click on the First Time User? Click Here link in the Sign In box. You will see the New Member Sign Up page. 3. Enter your [facility] Access Code exactly as it appears below. You will not need to use this code after youve completed the sign-up process. If you do not sign up before the expiration date, you must request a new code. · [facility] Access Code: [email or web address] Expires: 5/27/2017  4:13 AM 
 
4. Enter the last four digits of your Social Security Number (xxxx) and Date of Birth (mm/dd/yyyy) as indicated and click Submit. You will be taken to the next sign-up page. 5. Create a [facility] ID. This will be your [facility] login ID and cannot be changed, so think of one that is secure and easy to remember. 6. Create a [facility] password. You can change your password at any time. 7. Enter your Password Reset Question and Answer. This can be used at a later time if you forget your password. 8. Enter your e-mail address. You will receive e-mail notification when new information is available in [address] 9. Click Sign Up. You can now view and download portions of your medical record. 10. Click the Download Summary menu link to download a portable copy of your medical information. If you have questions, please visit the Frequently Asked Questions section of the [facility] website. Remember, [facility] is NOT to be used for urgent needs. For medical emergencies, dial 911. Now available from your iPhone and Android! General Information Please provide this summary of care documentation to your next provider. Patient Signature:  ____________________________________________________________ Date:  ____________________________________________________________  
  
[facility] Provider Signature:  ____________________________________________________________ Date:  ____________________________________________________________

## 2017-04-18 NOTE — IP AVS SNAPSHOT
Current Discharge Medication List  
  
CONTINUE these medications which have NOT CHANGED Dose & Instructions Dispensing Information Comments Morning Noon Evening Bedtime * albuterol 2.5 mg /3 mL (0.083 %) nebulizer solution Commonly known as:  PROVENTIL VENTOLIN Your last dose was: Your next dose is:    
   
   
 Dose:  2.5 mg  
3 mL by Nebulization route every four (4) hours as needed for Wheezing or Shortness of Breath. Quantity:  100 Each Refills:  1  
     
   
   
   
  
 * albuterol 90 mcg/actuation inhaler Commonly known as:  PROVENTIL HFA, VENTOLIN HFA, PROAIR HFA Your last dose was: Your next dose is:    
   
   
 Dose:  1 Puff Take 1 Puff by inhalation every four (4) hours as needed for Wheezing. Quantity:  1 Inhaler Refills:  0 ALPRAZolam 1 mg tablet Commonly known as:  Pamfelicia Franklina Your last dose was: Your next dose is:    
   
   
 Dose:  1 mg Take 1 Tab by mouth two (2) times a day. Max Daily Amount: 2 mg. Quantity:  20 Tab Refills:  0  
     
   
   
   
  
 amLODIPine 10 mg tablet Commonly known as:  Chadwick Brian Your last dose was: Your next dose is:    
   
   
 Dose:  10 mg Take 1 Tab by mouth daily. Quantity:  30 Tab Refills:  1  
     
   
   
   
  
 arformoterol 15 mcg/2 mL Nebu neb solution Commonly known as:  Kimberly Piper Your last dose was: Your next dose is:    
   
   
 Dose:  15 mcg  
2 mL by Nebulization route two (2) times a day. Quantity:  60 Vial  
Refills:  1  
     
   
   
   
  
 budesonide 0.5 mg/2 mL Nbsp Commonly known as:  PULMICORT Your last dose was: Your next dose is:    
   
   
 Dose:  500 mcg 2 mL by Nebulization route two (2) times a day. Quantity:  60 Each Refills:  1  
     
   
   
   
  
 budesonide-formoterol 160-4.5 mcg/actuation HFA inhaler Commonly known as:  SYMBICORT Your last dose was: Your next dose is:    
   
   
 Dose:  2 Puff Take 2 Puffs by inhalation two (2) times a day. Quantity:  1 Inhaler Refills:  1  
     
   
   
   
  
 bumetanide 0.5 mg tablet Commonly known as:  Hector Guajardo Your last dose was: Your next dose is:    
   
   
 Dose:  0.5 mg Take 1 Tab by mouth every fourty-eight (48) hours. Quantity:  30 Tab Refills:  1  
     
   
   
   
  
 famotidine 20 mg tablet Commonly known as:  PEPCID Your last dose was: Your next dose is:    
   
   
 Dose:  20 mg Take 1 Tab by mouth daily. Quantity:  30 Tab Refills:  1 FLUoxetine 20 mg capsule Commonly known as:  PROzac Your last dose was: Your next dose is:    
   
   
 Dose:  60 mg Take 3 Caps by mouth daily. Quantity:  90 Cap Refills:  1  
     
   
   
   
  
 hydrALAZINE 50 mg tablet Commonly known as:  APRESOLINE Your last dose was: Your next dose is:    
   
   
 Dose:  50 mg Take 1 Tab by mouth three (3) times daily. Quantity:  90 Tab Refills:  1  
     
   
   
   
  
 montelukast 10 mg tablet Commonly known as:  SINGULAIR Your last dose was: Your next dose is:    
   
   
 Dose:  10 mg Take 1 Tab by mouth nightly. Quantity:  30 Tab Refills:  1  
     
   
   
   
  
 oxyCODONE-acetaminophen  mg per tablet Commonly known as:  PERCOCET 10 Your last dose was: Your next dose is:    
   
   
 Dose:  1 Tab Take 1 Tab by mouth every eight (8) hours as needed. Max Daily Amount: 3 Tabs. Quantity:  20 Tab Refills:  0  
     
   
   
   
  
 tiotropium 18 mcg inhalation capsule Commonly known as:  Julisa Bishop Your last dose was: Your next dose is:    
   
   
 Dose:  1 Cap Take 1 Cap by inhalation daily. Quantity:  30 Cap Refills:  1  
     
   
   
   
  
 * Notice:   This list has 2 medication(s) that are the same as other medications prescribed for you. Read the directions carefully, and ask your doctor or other care provider to review them with you. STOP taking these medications OTHER Where to Get Your Medications Information on where to get these meds will be given to you by the nurse or doctor. ! Ask your nurse or doctor about these medications ALPRAZolam 1 mg tablet  
 oxyCODONE-acetaminophen  mg per tablet

## 2017-04-18 NOTE — ED NOTES
Assisted pt on bedpan; tolerated well. Pt gets very SOB with only slight exertion. Call light within reach. Will continue to closely monitor pt.

## 2017-04-18 NOTE — ED PROVIDER NOTES
HPI Comments: 10:27 AM Dalia Peabody is a 45 y.o. female with a history of hypertension, COPD, asthma who presents to ED via EMS complaining of severe shortness of breath. She was transported to [facility] at 6:30 PM yesterday for respiratory distress. She states that her sx did not improve and she was discharged at 1 AM. EMS was called to the house this morning and found her in severe distress, retractions, speaking in 1-2 word sentences. She was given a COMBI treatment, albuterol treatment, magnesium, and solumedrol. She reports history of prior intubation 1 month ago. No known drug allergies. No other complaints, associated symptoms or modifying factors at this time. The history is provided by the patient and the EMS personnel. Past Medical History:   Diagnosis Date    Asthma     Chronic obstructive pulmonary disease ([facility] [address]     Endocrine disease     thyroid issues    Gastrointestinal disorder     \"blockage in my stomach\"    Hypertension        History reviewed. No pertinent surgical history. History reviewed. No pertinent family history. Social History     Social History    Marital status:      Spouse name: N/A    Number of children: N/A    Years of education: N/A     Occupational History    Not on file. Social History Main Topics    Smoking status: Never Smoker    Smokeless tobacco: Never Used    Alcohol use No      Comment: socially    Drug use: No    Sexual activity: No      Comment: last used 9 years ago     Other Topics Concern    Not on file     Social History Narrative         ALLERGIES: Review of patient's allergies indicates no known allergies. Review of Systems   Constitutional: Negative for chills, diaphoresis, fatigue, fever and unexpected weight change. HENT: Negative for congestion, dental problem, ear discharge, ear pain, hearing loss, nosebleeds, postnasal drip, sinus pressure, sore throat, trouble swallowing and voice change.     Eyes: Negative for photophobia, pain, discharge, redness and visual disturbance. Respiratory: Negative for cough, chest tightness, shortness of breath, wheezing and stridor. Cardiovascular: Negative for chest pain, palpitations and leg swelling. Gastrointestinal: Negative for abdominal distention, abdominal pain, anal bleeding, blood in stool, constipation, diarrhea, nausea and vomiting. Genitourinary: Negative for difficulty urinating, dyspareunia, dysuria, flank pain, frequency, genital sores, hematuria, menstrual problem, pelvic pain, urgency, vaginal bleeding, vaginal discharge and vaginal pain. Musculoskeletal: Negative for arthralgias, back pain, joint swelling, myalgias, neck pain and neck stiffness. Skin: Negative for color change, rash and wound. Neurological: Negative for dizziness, tremors, seizures, syncope, weakness, light-headedness, numbness and headaches. Hematological: Negative for adenopathy. Does not bruise/bleed easily. Psychiatric/Behavioral: Negative for agitation, confusion, decreased concentration, hallucinations, sleep disturbance and suicidal ideas. The patient is not nervous/anxious. All other systems reviewed and are negative. Vitals:    04/18/17 1036 04/18/17 1044 04/18/17 1200 04/18/17 1222   BP: (!) 204/95  179/89    Pulse: 86  86    Resp: 29  23    Temp: 97.6 °F (36.4 °C)      SpO2: 100% 100% 100% 96%   Weight: 113.4 kg (250 lb)               Physical Exam   Constitutional: She is oriented to person, place, and time. She appears well-developed and well-nourished. She appears distressed. 45year old [facility] American female present in severe respiratory distress. (+) retractions, speaking in 1-2 word sentences. HENT:   Head: Normocephalic and atraumatic. Right Ear: External ear normal.   Left Ear: External ear normal.   Nose: Nose normal.   Mouth/Throat: Oropharynx is clear and moist. No oropharyngeal exudate.    Eyes: Conjunctivae and EOM are normal. Pupils are equal, round, and reactive to light. Right eye exhibits no discharge. Left eye exhibits no discharge. No scleral icterus. Neck: JVD present. No tracheal deviation present. No thyromegaly present. Cardiovascular: Normal rate, regular rhythm, normal heart sounds and intact distal pulses. Exam reveals no gallop and no friction rub. No murmur heard. Pulmonary/Chest: No stridor. She is in respiratory distress. She has wheezes. She has no rales. She exhibits no tenderness. Abdominal: Soft. Bowel sounds are normal. She exhibits no distension and no mass. There is no tenderness. There is no rebound and no guarding. Musculoskeletal: Normal range of motion. She exhibits no edema, tenderness or deformity. Lymphadenopathy:     She has no cervical adenopathy. Neurological: She is alert and oriented to person, place, and time. No cranial nerve deficit. Skin: Skin is warm and dry. No rash noted. She is not diaphoretic. No erythema. No pallor. Psychiatric: She has a normal mood and affect. Her behavior is normal. Judgment and thought content normal.   Nursing note and vitals reviewed. MDM  Number of Diagnoses or Management Options  Acute respiratory failure with hypoxia [facility]):   COPD with acute exacerbation [facility]):   Diagnosis management comments: Patient arrived in severe respiratory distress requiring my immediate attention at bedside. Respiratory therapist called stat to place the patient on BiPAP  IV line established, blood drawn, orders written. She has failed outpatient treatment. Patient will require admission for further evaluation and treatment.         Amount and/or Complexity of Data Reviewed  Clinical lab tests: ordered and reviewed  Tests in the radiology section of CPT®: ordered and reviewed  Tests in the medicine section of CPT®: ordered and reviewed  Discussion of test results with the performing providers: yes  Decide to obtain previous medical records or to obtain history from someone other than the patient: yes  Obtain history from someone other than the patient: yes  Review and summarize past medical records: yes  Discuss the patient with other providers: yes  Independent visualization of images, tracings, or specimens: yes    Risk of Complications, Morbidity, and/or Mortality  Presenting problems: high  Diagnostic procedures: high  Management options: high    Critical Care  Total time providing critical care: 30-74 minutes    Patient Progress  Patient progress: improved    ED Course       Procedures    Vitals:  Patient Vitals for the past 12 hrs:   Temp Pulse Resp BP SpO2   04/18/17 1222 - - - - 96 %   04/18/17 1200 - 86 23 179/89 100 %   04/18/17 1044 - - - - 100 %   04/18/17 1036 97.6 °F (36.4 °C) 86 29 (!) 204/95 100 %   04/18/17 1031 - 85 30 - 100 %   04/18/17 1030 - - - (!) 204/95 (!) 76 %     76% on RA, indicating inadequate oxygenation.     Medications ordered:   Medications   albuterol (PROVENTIL VENTOLIN) nebulizer solution 2.5 mg (2.5 mg Nebulization Given 4/18/17 1221)   sodium chloride (NS) flush 5-10 mL (not administered)   sodium chloride (NS) flush 5-10 mL (10 mL IntraVENous Given 4/18/17 1207)   amLODIPine (NORVASC) tablet 10 mg (not administered)   budesonide (PULMICORT) 500 mcg/2 ml nebulizer suspension (not administered)   bumetanide (BUMEX) tablet 0.5 mg (not administered)   famotidine (PEPCID) tablet 20 mg (not administered)   FLUoxetine (PROzac) capsule 60 mg (not administered)   hydrALAZINE (APRESOLINE) tablet 50 mg (not administered)   montelukast (SINGULAIR) tablet 10 mg (not administered)   tiotropium (SPIRIVA) inhalation capsule 18 mcg (not administered)   morphine injection 2 mg (not administered)   enoxaparin (LOVENOX) injection 40 mg (not administered)   methylPREDNISolone (PF) (SOLU-MEDROL) injection 60 mg (not administered)   doxycycline (VIBRAMYCIN) 100 mg in 0.9% sodium chloride (MBP/ADV) 100 mL MBP (not administered)   potassium chloride 10 mEq, lidocaine (PF) (XYLOCAINE) 10 mg/mL (1 %) 1 mL in 0.9% sodium chloride 100 mL IVPB (not administered)   albuterol-ipratropium (DUO-NEB) 2.5 MG-0.5 MG/3 ML (not administered)   insulin lispro (HUMALOG) injection (not administered)   glucose chewable tablet 16 g (not administered)   glucagon (GLUCAGEN) injection 1 mg (not administered)   dextrose (D50W) injection syrg 12.5-25 g (not administered)   albuterol-ipratropium (DUO-NEB) 2.5 MG-0.5 MG/3 ML (3 mL Nebulization Given 4/18/17 1042)   HYDROmorphone (PF) (DILAUDID) injection 1 mg (1 mg IntraVENous Given 4/18/17 1207)   ondansetron (ZOFRAN) injection 4 mg (4 mg IntraVENous Given 4/18/17 1207)         Lab findings:  Recent Results (from the past 12 hour(s))   PTT    Collection Time: 04/18/17 10:44 AM   Result Value Ref Range    aPTT 27.8 23.0 - 36.4 SEC   PROTHROMBIN TIME + INR    Collection Time: 04/18/17 10:44 AM   Result Value Ref Range    Prothrombin time 13.7 11.5 - 15.2 sec    INR 1.1 0.8 - 1.2     PRO-BNP    Collection Time: 04/18/17 10:44 AM   Result Value Ref Range    NT pro- 0 - 900 PG/ML   TSH 3RD GENERATION    Collection Time: 04/18/17 10:44 AM   Result Value Ref Range    TSH 0.07 (L) 0.36 - 2.38 uIU/mL   METABOLIC PANEL, BASIC    Collection Time: 04/18/17 10:44 AM   Result Value Ref Range    Sodium 141 136 - 145 mmol/L    Potassium 3.2 (L) 3.5 - 5.5 mmol/L    Chloride 105 100 - 108 mmol/L    CO2 29 21 - 32 mmol/L    Anion gap 7 3.0 - 18 mmol/L    Glucose 103 (H) 74 - 99 mg/dL    BUN 9 7.0 - 18 MG/DL    Creatinine 1.11 0.6 - 1.3 MG/DL    BUN/Creatinine ratio 8 (L) 12 - 20      GFR est AA >60 >60 ml/min/1.73m2    GFR est non-AA 52 (L) >60 ml/min/1.73m2    Calcium 9.0 8.5 - 10.1 MG/DL   CARDIAC PANEL,(CK, CKMB & TROPONIN)    Collection Time: 04/18/17 10:44 AM   Result Value Ref Range    CK 55 26 - 192 U/L    CK - MB <1.0 <3.6 ng/ml    CK-MB Index Cannot be calulated 0.0 - 4.0 %    Troponin-I, Qt. <0.02 0.0 - 0.045 NG/ML   HEPATIC FUNCTION PANEL Collection Time: 04/18/17 10:44 AM   Result Value Ref Range    Protein, total 7.4 6.4 - 8.2 g/dL    Albumin 3.3 (L) 3.4 - 5.0 g/dL    Globulin 4.1 (H) 2.0 - 4.0 g/dL    A-G Ratio 0.8 0.8 - 1.7      Bilirubin, total 0.9 0.2 - 1.0 MG/DL    Bilirubin, direct 0.2 0.0 - 0.2 MG/DL    Alk. phosphatase 125 (H) 45 - 117 U/L    AST (SGOT) 14 (L) 15 - 37 U/L    ALT (SGPT) 24 13 - 56 U/L   CBC WITH AUTOMATED DIFF    Collection Time: 04/18/17 10:44 AM   Result Value Ref Range    WBC 7.8 4.6 - 13.2 K/uL    RBC 4.81 4.20 - 5.30 M/uL    HGB 13.1 12.0 - 16.0 g/dL    HCT 41.7 35.0 - 45.0 %    MCV 86.7 74.0 - 97.0 FL    MCH 27.2 24.0 - 34.0 PG    MCHC 31.4 31.0 - 37.0 g/dL    RDW 16.0 (H) 11.6 - 14.5 %    PLATELET 644 801 - 527 K/uL    MPV 11.7 9.2 - 11.8 FL    NEUTROPHILS 64 40 - 73 %    LYMPHOCYTES 28 21 - 52 %    MONOCYTES 6 3 - 10 %    EOSINOPHILS 2 0 - 5 %    BASOPHILS 0 0 - 2 %    ABS. NEUTROPHILS 5.0 1.8 - 8.0 K/UL    ABS. LYMPHOCYTES 2.2 0.9 - 3.6 K/UL    ABS. MONOCYTES 0.5 0.05 - 1.2 K/UL    ABS. EOSINOPHILS 0.2 0.0 - 0.4 K/UL    ABS.  BASOPHILS 0.0 0.0 - 0.1 K/UL    DF AUTOMATED     EKG, 12 LEAD, INITIAL    Collection Time: 04/18/17 12:14 PM   Result Value Ref Range    Ventricular Rate 83 BPM    Atrial Rate 83 BPM    P-R Interval 134 ms    QRS Duration 76 ms    Q-T Interval 396 ms    QTC Calculation (Bezet) 465 ms    Calculated P Axis 68 degrees    Calculated R Axis 49 degrees    Calculated T Axis 71 degrees    Diagnosis       Normal sinus rhythm  Nonspecific T wave abnormality  Prolonged QT  Abnormal ECG  When compared with ECG of 09-APR-2017 17:24,  Previous ECG has undetermined rhythm, needs review  QRS axis shifted left  Criteria for Lateral infarct are no longer present  Criteria for Inferior infarct are no longer present     POC G3    Collection Time: 04/18/17 12:18 PM   Result Value Ref Range    Device: BIPAP      FIO2 (POC) 30 %    pH (POC) 7.332 (L) 7.35 - 7.45      pCO2 (POC) 47.9 (H) 35.0 - 45.0 MMHG    pO2 (POC) 75 (L) 80 - 100 MMHG    HCO3 (POC) 25.4 22 - 26 MMOL/L    sO2 (POC) 94 92 - 97 %    Base deficit (POC) 1 mmol/L    PEEP/CPAP (POC) 6 cmH2O    Pressure support 10 cmH2O    Allens test (POC) YES      Total resp. rate 26      Site LEFT RADIAL      Specimen type (POC) ARTERIAL      Performed by Nancy Wilson timed YES       X-Ray, CT or other radiology findings or impressions:  XR CHEST PORT   Final Result   IMPRESSION:     No acute findings. Progress notes, Consult notes or additional Procedure notes:   11:15 AM - Patient refused ABG after 2 sticks by the respiratory therapist.   11:58 AM - Consult:  Discussed care with Dr. Jimi Swift, the hospitalist. Standard discussion; including history of patients chief complaint, available diagnostic results, and treatment course. Reevaluation of patient:       Disposition:  Diagnosis:   1. Acute respiratory failure with hypoxia ([facility] [address]    2. [facility]        Disposition: Admit. Follow-up Information     None           Patient's Medications   Start Taking    No medications on file   Continue Taking    ALBUTEROL (PROVENTIL HFA, VENTOLIN HFA, PROAIR HFA) 90 MCG/ACTUATION INHALER    Take 1 Puff by inhalation every four (4) hours as needed for Wheezing. ALBUTEROL (PROVENTIL VENTOLIN) 2.5 MG /3 ML (0.083 %) NEBULIZER SOLUTION    3 mL by Nebulization route every four (4) hours as needed for Wheezing or Shortness of Breath. ALPRAZOLAM (XANAX) 1 MG TABLET    Take 1 Tab by mouth two (2) times a day. Max Daily Amount: 2 mg. AMLODIPINE (NORVASC) 10 MG TABLET    Take 1 Tab by mouth daily. ARFORMOTEROL (BROVANA) 15 MCG/2 ML NEBU NEB SOLUTION    2 mL by Nebulization route two (2) times a day. BUDESONIDE (PULMICORT) 0.5 MG/2 ML NBSP    2 mL by Nebulization route two (2) times a day. BUDESONIDE-FORMOTEROL (SYMBICORT) 160-4.5 MCG/ACTUATION HFA INHALER    Take 2 Puffs by inhalation two (2) times a day.     BUMETANIDE (BUMEX) 0.5 MG TABLET    Take 1 Tab by mouth every fourty-eight (48) hours. FAMOTIDINE (PEPCID) 20 MG TABLET    Take 1 Tab by mouth daily. FLUOXETINE (PROZAC) 20 MG CAPSULE    Take 3 Caps by mouth daily. HYDRALAZINE (APRESOLINE) 50 MG TABLET    Take 1 Tab by mouth three (3) times daily. MONTELUKAST (SINGULAIR) 10 MG TABLET    Take 1 Tab by mouth nightly. OTHER    Check CBC, CMP, Mg in 5 days, results to PCP immediately, Diagnosis- COPD    OTHER    Resume Home Oxygen    OTHER    Resume CPAP QHS    OXYCODONE-ACETAMINOPHEN (PERCOCET 10)  MG PER TABLET    Take 1 Tab by mouth every eight (8) hours as needed. Max Daily Amount: 3 Tabs. TIOTROPIUM (SPIRIVA) 18 MCG INHALATION CAPSULE    Take 1 Cap by inhalation daily. These Medications have changed    No medications on file   Stop Taking    ALBUTEROL (VENTOLIN HFA) 90 MCG/ACTUATION INHALER    Take 2 Puffs by inhalation every six (6) hours as needed for Wheezing or Shortness of Breath. LEVOFLOXACIN (LEVAQUIN) 500 MG TABLET    Take 1 Tab by mouth every twenty-four (24) hours. PREDNISONE (DELTASONE) 10 MG TABLET    4 tabs ( 40 mg ) po daily for 3 days, then 2 tabs ( 20 mg ) po daily for 3 days, then 1 tab ( 10 mg ) po daily for 3 days, then stop       [employer]ribe [employer] scribing for and in the presence of Yumiko Rocha DO 10:28 AM, 04/18/17. Signed by: Cherie Giles, 10:28 AM, 04/18/17. Physician Attestation  I personally performed the services described in the documentation, reviewed and edited the documentation which was dictated to the scribe in my presence, and it accurately records my words and actions.   Yumiko Rocha DO 10:28 AM, 04/18/17

## 2017-04-18 NOTE — ED NOTES
Pt placed on 2L N/C; tolerating well w/ only slight SOB at rest. Pt very demanding requesting food as she is now off the BiPAP mask.  Explained to pt she is NPO at this time; will adolph RAND.

## 2017-04-18 NOTE — IP AVS SNAPSHOT
Summary of Care Report The Summary of Care report has been created to help improve care coordination. Users with access to [employer] or [employer] (Web-based application) may access additional patient information including the Discharge Summary. If you are not currently a [employer] [facility] user and need more information, please call the number listed below in the [place] [address] section and ask to be connected with Medical Records. Facility Information Name Address Phone [address] [place] [address] [facility] [id number] [phone] Patient Information Patient Name Sex  Weston Haney ([id number]) Female 1965 Discharge Information Admitting Provider Service Area Unit Joel Bray MD /  [facility] / [phone] Discharge Provider Discharge Date/Time Discharge Disposition Destination (none) 2017 (Pending) AHR (none) Patient Language Language ENGLISH [13] Hospital Problems as of 2017  Reviewed: 3/10/2017  6:41 AM by Raquel Mcdaniel MD  
  
  
  
 Class Noted - Resolved Last Modified POA Active Problems Acute asthma exacerbation  2017 - Present 2017 by Joel Bray MD Unknown Entered by Joel Bray MD  
  
Non-Hospital Problems as of 2017  Reviewed: 3/10/2017  6:41 AM by Raquel Mcdaniel MD  
  
  
  
 Class Noted - Resolved Last Modified Active Problems Asthma exacerbation attacks  10/19/2015 - Present 10/19/2015 by Randal Aguilera DO Entered by Randal Aguilera DO   Status asthmaticus with COPD (chronic obstructive pulmonary disease) [facility]  10/19/2015 - Present 10/19/2015 by Star Au MD  
  Entered by Star Au MD  
  Abnormal CT of the chest  2015 - Present 2015 by Dexter Chase MD  
 Entered by Beverly Foss MD  
  Shortness of breath  3/5/2016 - Present 1/26/2017 by Linn Cristobal MD  
  Entered by Ngozi Raines MD  
  Asthma  3/5/2016 - Present 3/5/2016 by Rosemary Oneil MD  
  Entered by Rosemary Oneil MD  
  COPD exacerbation ([address]  3/12/2016 - Present 3/10/2017 by Luis Lopez DO Entered by Kiya Ho MD  
  Acute on chronic respiratory failure with hypoxemia ([address]  3/12/2016 - Present 3/12/2016 by Kathleen Long MD  
  Entered by Kathleen Long MD  
  COPD with exacerbation [facility])  7/11/2016 - Present 7/11/2016 by DARCIE Snyder Entered by DARCIE Snyder Acute exacerbation of chronic obstructive pulmonary disease (COPD) ([address]  8/29/2016 - Present 8/29/2016 by Rodrick Meraz DO Entered by Rodrick Meraz DO Respiratory failure ([address]  1/11/2017 - Present 1/11/2017 by Yamileth Woods MD  
  Entered by Yamileth Woods MD  
  Acute encephalopathy  1/11/2017 - Present 1/11/2017 by Yamileth Woods MD  
  Entered by Yamileth Woods MD  
  Asthma exacerbation  1/26/2017 - Present 1/26/2017 by Elsa Swift MD  
  Entered by Elsa Swift MD  
  Acute respiratory failure with hypercapnia ([address]  2/26/2017 - Present 2/26/2017 by Kathleen Long MD  
  Entered by Kathleen Long MD  
  COPD with acute exacerbation [facility])  3/2/2017 - Present 3/10/2017 by Alison Moreno MD  
  Entered by Danelle Melendrez MD  
  Essential hypertension  3/10/2017 - Present 3/10/2017 by Alison Moreno MD  
  Entered by Alison Moreno MD  
  Morbid obesity due to excess calories ([address]  3/10/2017 - Present 3/10/2017 by Alison Moreno MD  
  Entered by Alison Moreno MD  
  Chest pain on breathing  3/10/2017 - Present 3/10/2017 by Alison Moreno MD  
  Entered by Alison Moreno MD  
  
You are allergic to the following No active allergies Current Discharge Medication List  
  
CONTINUE these medications which have NOT CHANGED Dose & Instructions Dispensing Information Comments * albuterol 2.5 mg /3 mL (0.083 %) nebulizer solution Commonly known as:  PROVENTIL VENTOLIN Dose:  2.5 mg  
3 mL by Nebulization route every four (4) hours as needed for Wheezing or Shortness of Breath. Quantity:  100 Each Refills:  1  
   
 * albuterol 90 mcg/actuation inhaler Commonly known as:  PROVENTIL HFA, VENTOLIN HFA, PROAIR HFA Dose:  1 Puff Take 1 Puff by inhalation every four (4) hours as needed for Wheezing. Quantity:  1 Inhaler Refills:  0 ALPRAZolam 1 mg tablet Commonly known as:  Linton Hermelinda Dose:  1 mg Take 1 Tab by mouth two (2) times a day. Max Daily Amount: 2 mg. Quantity:  20 Tab Refills:  0  
   
 amLODIPine 10 mg tablet Commonly known as:  Claudell Hesselbach Dose:  10 mg Take 1 Tab by mouth daily. Quantity:  30 Tab Refills:  1  
   
 arformoterol 15 mcg/2 mL Nebu neb solution Commonly known as:  Oddis [place] Dose:  15 mcg  
2 mL by Nebulization route two (2) times a day. Quantity:  60 Vial  
Refills:  1  
   
 budesonide 0.5 mg/2 mL Nbsp Commonly known as:  PULMICORT Dose:  500 mcg 2 mL by Nebulization route two (2) times a day. Quantity:  60 Each Refills:  1  
   
 budesonide-formoterol 160-4.5 mcg/actuation HFA inhaler Commonly known as:  SYMBICORT Dose:  2 Puff Take 2 Puffs by inhalation two (2) times a day. Quantity:  1 Inhaler Refills:  1  
   
 bumetanide 0.5 mg tablet Commonly known as:  Deborah Ronan Dose:  0.5 mg Take 1 Tab by mouth every fourty-eight (48) hours. Quantity:  30 Tab Refills:  1  
   
 famotidine 20 mg tablet Commonly known as:  PEPCID Dose:  20 mg Take 1 Tab by mouth daily. Quantity:  30 Tab Refills:  1 FLUoxetine 20 mg capsule Commonly known as:  PROzac Dose:  60 mg Take 3 Caps by mouth daily. Quantity:  90 Cap Refills:  1  
   
 hydrALAZINE 50 mg tablet Commonly known as:  APRESOLINE  Dose:  50 mg  
 Take 1 Tab by mouth three (3) times daily. Quantity:  90 Tab Refills:  1  
   
 montelukast 10 mg tablet Commonly known as:  SINGULAIR Dose:  10 mg Take 1 Tab by mouth nightly. Quantity:  30 Tab Refills:  1  
   
 oxyCODONE-acetaminophen  mg per tablet Commonly known as:  PERCOCET 10 Dose:  1 Tab Take 1 Tab by mouth every eight (8) hours as needed. Max Daily Amount: 3 Tabs. Quantity:  20 Tab Refills:  0  
   
 tiotropium 18 mcg inhalation capsule Commonly known as:  Stallworth Erm Dose:  1 Cap Take 1 Cap by inhalation daily. Quantity:  30 Cap Refills:  1  
   
 * Notice: This list has 2 medication(s) that are the same as other medications prescribed for you. Read the directions carefully, and ask your doctor or other care provider to review them with you. STOP taking these medications Comments OTHER Current Immunizations Name Date Influenza Vaccine (Quad) PF 10/26/2015 Pneumococcal Polysaccharide (PPSV-23) 10/26/2015 Follow-up Information Follow up With Details Comments Contact Info Shalini Knox MD   Patient can only remember the practice name and not the physician Discharge Instructions None Chart Review Routing History Recipient Method Report Sent By Alta Wray MD  
Phone: [phone] In Basket IP Auto Routed Rd SPENCE MD [[id number]] 10/20/2015  6:00 PM 10/20/2015  
 [facility] Fax: [phone] Fax [facility] IP AMB RESULT REPORT IMAGING Aron Salinas [[id number]] 10/25/2015  7:36 PM 10/25/2015 Levi Hernandez MD  
Phone: [phone] In [employer] IP Auto Routed Trans Levi Hernandez MD [[id number]] 10/26/2015  5:56 PM 10/26/2015 Azul Wray MD  
Phone: [phone] In [employer] IP Auto Routed Trans Levi Hernandez MD [[id number]] 10/26/2015  5:56 PM 10/26/2015 Azul Wray MD  
Phone: [phone] In Basket IP Auto Routed Rd SPENCE MD [[id number]] 11/1/2015 11:15 AM 11/01/2015 Wil Mcginnis MD  
Phone: [phone] In Basket IP Auto Routed [address], [place] [[id number]] 11/2/2015  2:34 PM 11/02/2015 Vic Richardson MD  
Phone: [phone] In Basket IP Auto Routed [address], [place] [[id number]] 11/2/2015  2:34 PM 11/02/2015 Marck Pulliam MD  
Phone: [phone] In Basket IP Auto Routed [address] [facility] Shiva Elliott MD [[id number]] 11/18/2015  8:17 AM 11/18/2015 Wil Mcginnis MD  
Phone: [phone] In Basket IP Auto Routed [address], [place] [[id number]] 11/18/2015  8:17 AM 11/18/2015 Nikki Gordon MD  
Phone: [phone] In Basket IP Auto Routed [address], [place] [[id number]] 11/18/2015  8:17 AM 11/18/2015 Eloina Ordonez RN Phone: [phone] In Basket Notes/Transcriptions [facility] [[id number]] 11/23/2015  2:17 PM 11/20/2015 Alicia Degroot NP Phone: [phone] In [employer] IP Auto Routed [address], [place] [[id number]] 11/26/2015 12:40 PM 11/26/2015 Aminta Syed MD  
Phone: [phone] In [employer] IP Auto Routed [address], [place] [[id number]] 11/26/2015 12:40 PM 11/26/2015 Wil Mcginnis MD  
Phone: [phone] In [employer] IP Auto Routed [address], [place] [[id number]] 11/26/2015 12:40 PM 11/26/2015 Nikki Grier MD  
Phone: [phone] In [employer] Routed [place], [place] [[id number]] 2/7/2016 12:07 PM 02/07/2016 Brandon Salinas MD  
Fax: [phone] Phone: [phone] Fax IP Auto Routed Trans Gabriela Lopez MD [[id number]] 3/5/2016  8:21 PM 03/05/2016 Aminta Syed MD  
Phone: [phone] In Basket IP Auto Routed Trans Gabriela Lopez MD [[id number]] 3/5/2016  8:21 PM 03/05/2016 Kevin Sim DO Phone: [phone] In Basket IP Auto Routed Trans Gabriela Lopez MD [[id number]] 3/5/2016  8:21 PM 03/05/2016 Gabriela Lopez MD  
Phone: [phone] In Basket IP Auto Routed Trans Gabriela Lopez MD [[id number]] 3/5/2016  8:21 PM 03/05/2016 Brandon Salinas MD  
Fax: [phone] Phone: [phone] Fax IP Auto Routed Mario Tanner III, MD [[id number]] 3/18/2016  8:44 PM 03/18/2016 Vic Richardson MD  
Phone: [phone] In Basket IP Auto Routed Mario Tanner III, MD [[id number]] 3/18/2016  8:44 PM 03/18/2016 Michelle Kaufman MD  
Phone: [phone] In Basket IP Auto Routed Annita Palomo MD [[id number]] 3/20/2016  2:36 PM 03/20/2016 Brandon Salinas MD  
Fax: [phone] Phone: [phone] Fax Bridgettie Lukas Routed Annita Palomo  [phone] 3/20/2016  2:36 PM 03/20/2016 Mike Gaitan MD  
Phone: [phone] In Basket IP Auto Routed [employer], MD [[id number]] 3/20/2016  2:36 PM 03/20/2016 Vic Richardson MD  
Phone: [phone] In Basket IP Auto Routed Annita Palomo MD [[id number]] 3/20/2016  2:36 PM 03/20/2016 Brandon Salinas MD  
Fax: [phone] Phone: [phone] Fax IP Auto Routed Annita Palomo MD [[id number]] 5/19/2016  3:19 PM 05/19/2016 Brady Quiñones MD  
Phone: [phone] In Basket IP Auto Routed Annita Palomo MD [[id number]] 5/19/2016  3:19 PM 05/19/2016 Mike Gaitan MD  
Phone: [phone] In Basket IP Auto Routed Annita Palomo MD [[id number]] 5/19/2016  3:19 PM 05/19/2016 Monica Collins MD  
Phone: [phone] In Basket IP Auto Routed Annita Palomo MD [[id number]] 5/19/2016  3:19 PM 05/19/2016 Brandon Salinas MD  
Fax: [phone] Phone: [phone] Fax IP Auto Routed MD Michelle [[id number]] 7/4/2016  4:21 PM 07/04/2016 Michelle Kaufman MD  
Phone: [phone] In [facility] IP Auto Routed MD Michelle [[id number]] 7/4/2016  4:21 PM 07/04/2016 Kristopher Kern MD  
Phone: [phone]  In [facility] IP Auto Routed MD Michelle [[id number]] 7/4/2016  4:21 PM 07/04/2016 Parul Nava MD  
Phone: [phone] In [employer] IP Auto Routed MD Michelle [[id number]] 7/4/2016  4:21 PM 07/04/2016 Claudia Camara MD  
Fax: [phone] Phone: [phone] Fax IP Auto Routed Trans Sal Burnette MD [[id number]] 7/14/2016  3:34 PM 07/14/2016 Aashish Morel MD  
Phone: [phone] In Basket IP Auto Routed Trans Sal Burnette MD [[id number]] 7/14/2016  3:34 PM 07/14/2016 Sal Burnette MD  
Phone: [phone] In Basket IP Auto Routed Trans Sal Burnette MD [[id number]] 7/14/2016  3:34 PM 07/14/2016 Naeem Enriquez MD  
Fax: [phone] Phone: [phone] Fax IP Auto Routed Trans Sal Burnette MD [[id number]] 7/14/2016  3:34 PM 07/14/2016 Beverly Wilde RN Phone: [phone] In Basket Notes/Transcriptions Marco A Espinoza, [address] [place] [address] [[id number]] 7/14/2016  4:09 PM 07/14/2016 Claudia Camara MD  
Fax: [phone] Phone: [phone] Fax [facility] IP MD NOTES AUTO ROUTING REPORT Sasha Davis MD [[id number]] 9/6/2016  9:11 AM 09/06/2016 Denzel Alexis MD  
Fax: [phone] Phone: [phone] Fax IP Auto Routed Annita Palomo  [phone] 1/15/2017 11:48 AM 01/15/2017 Radha Tran MD  
Phone: [phone] In Basket IP Auto Routed [employer], MD [[id number]] 1/15/2017 11:48 AM 01/15/2017

## 2017-04-19 ENCOUNTER — APPOINTMENT (OUTPATIENT)
Dept: GENERAL RADIOLOGY | Age: 52
DRG: 140 | End: 2017-04-19
Attending: NURSE PRACTITIONER
Payer: MEDICAID

## 2017-04-19 LAB
ANION GAP BLD CALC-SCNC: 9 MMOL/L (ref 3–18)
ARTERIAL PATENCY WRIST A: YES
ATRIAL RATE: 120 BPM
BASE EXCESS BLD CALC-SCNC: 0 MMOL/L
BASOPHILS # BLD AUTO: 0 K/UL (ref 0–0.1)
BASOPHILS # BLD: 0 % (ref 0–2)
BDY SITE: NORMAL
BUN SERPL-MCNC: 11 MG/DL (ref 7–18)
BUN/CREAT SERPL: 11 (ref 12–20)
CALCIUM SERPL-MCNC: 8.8 MG/DL (ref 8.5–10.1)
CALCULATED P AXIS, ECG09: 76 DEGREES
CALCULATED R AXIS, ECG10: 71 DEGREES
CALCULATED T AXIS, ECG11: 55 DEGREES
CHLORIDE SERPL-SCNC: 106 MMOL/L (ref 100–108)
CO2 SERPL-SCNC: 26 MMOL/L (ref 21–32)
CREAT SERPL-MCNC: 1 MG/DL (ref 0.6–1.3)
DIAGNOSIS, 93000: NORMAL
DIFFERENTIAL METHOD BLD: ABNORMAL
EOSINOPHIL # BLD: 0 K/UL (ref 0–0.4)
EOSINOPHIL NFR BLD: 0 % (ref 0–5)
ERYTHROCYTE [DISTWIDTH] IN BLOOD BY AUTOMATED COUNT: 16 % (ref 11.6–14.5)
GAS FLOW.O2 O2 DELIVERY SYS: NORMAL L/MIN
GAS FLOW.O2 SETTING OXYMISER: 6 L/M
GLUCOSE BLD STRIP.AUTO-MCNC: 124 MG/DL (ref 70–110)
GLUCOSE BLD STRIP.AUTO-MCNC: 128 MG/DL (ref 70–110)
GLUCOSE BLD STRIP.AUTO-MCNC: 140 MG/DL (ref 70–110)
GLUCOSE BLD STRIP.AUTO-MCNC: 144 MG/DL (ref 70–110)
GLUCOSE SERPL-MCNC: 142 MG/DL (ref 74–99)
HCO3 BLD-SCNC: 24.2 MMOL/L (ref 22–26)
HCT VFR BLD AUTO: 40.6 % (ref 35–45)
HGB BLD-MCNC: 12.8 G/DL (ref 12–16)
LYMPHOCYTES # BLD AUTO: 12 % (ref 21–52)
LYMPHOCYTES # BLD: 1 K/UL (ref 0.9–3.6)
MAGNESIUM SERPL-MCNC: 2.1 MG/DL (ref 1.6–2.6)
MCH RBC QN AUTO: 26.6 PG (ref 24–34)
MCHC RBC AUTO-ENTMCNC: 31.5 G/DL (ref 31–37)
MCV RBC AUTO: 84.4 FL (ref 74–97)
MONOCYTES # BLD: 0.1 K/UL (ref 0.05–1.2)
MONOCYTES NFR BLD AUTO: 1 % (ref 3–10)
NEUTS SEG # BLD: 7.2 K/UL (ref 1.8–8)
NEUTS SEG NFR BLD AUTO: 87 % (ref 40–73)
P-R INTERVAL, ECG05: 124 MS
PCO2 BLD: 37.5 MMHG (ref 35–45)
PH BLD: 7.42 [PH] (ref 7.35–7.45)
PLATELET # BLD AUTO: 212 K/UL (ref 135–420)
PMV BLD AUTO: 11.6 FL (ref 9.2–11.8)
PO2 BLD: 86 MMHG (ref 80–100)
POTASSIUM SERPL-SCNC: 3.2 MMOL/L (ref 3.5–5.5)
Q-T INTERVAL, ECG07: 344 MS
QRS DURATION, ECG06: 74 MS
QTC CALCULATION (BEZET), ECG08: 486 MS
RBC # BLD AUTO: 4.81 M/UL (ref 4.2–5.3)
SAO2 % BLD: 97 % (ref 92–97)
SERVICE CMNT-IMP: NORMAL
SODIUM SERPL-SCNC: 141 MMOL/L (ref 136–145)
SPECIMEN TYPE: NORMAL
TOTAL RESP. RATE, ITRR: 24
VENTRICULAR RATE, ECG03: 120 BPM
WBC # BLD AUTO: 8.3 K/UL (ref 4.6–13.2)

## 2017-04-19 PROCEDURE — 93970 EXTREMITY STUDY: CPT

## 2017-04-19 PROCEDURE — 74011000250 HC RX REV CODE- 250: Performed by: INTERNAL MEDICINE

## 2017-04-19 PROCEDURE — 82803 BLOOD GASES ANY COMBINATION: CPT

## 2017-04-19 PROCEDURE — 94660 CPAP INITIATION&MGMT: CPT

## 2017-04-19 PROCEDURE — 74011000258 HC RX REV CODE- 258: Performed by: INTERNAL MEDICINE

## 2017-04-19 PROCEDURE — 74011250636 HC RX REV CODE- 250/636: Performed by: INTERNAL MEDICINE

## 2017-04-19 PROCEDURE — 96375 TX/PRO/DX INJ NEW DRUG ADDON: CPT

## 2017-04-19 PROCEDURE — 74011000250 HC RX REV CODE- 250: Performed by: NURSE PRACTITIONER

## 2017-04-19 PROCEDURE — 96366 THER/PROPH/DIAG IV INF ADDON: CPT

## 2017-04-19 PROCEDURE — 82962 GLUCOSE BLOOD TEST: CPT

## 2017-04-19 PROCEDURE — 71010 XR CHEST PORT: CPT

## 2017-04-19 PROCEDURE — 83735 ASSAY OF MAGNESIUM: CPT | Performed by: INTERNAL MEDICINE

## 2017-04-19 PROCEDURE — 96376 TX/PRO/DX INJ SAME DRUG ADON: CPT

## 2017-04-19 PROCEDURE — 74011250636 HC RX REV CODE- 250/636

## 2017-04-19 PROCEDURE — 99218 HC RM OBSERVATION: CPT

## 2017-04-19 PROCEDURE — 80048 BASIC METABOLIC PNL TOTAL CA: CPT | Performed by: INTERNAL MEDICINE

## 2017-04-19 PROCEDURE — 36600 WITHDRAWAL OF ARTERIAL BLOOD: CPT

## 2017-04-19 PROCEDURE — 74011250637 HC RX REV CODE- 250/637: Performed by: INTERNAL MEDICINE

## 2017-04-19 PROCEDURE — 96372 THER/PROPH/DIAG INJ SC/IM: CPT

## 2017-04-19 PROCEDURE — 74011250637 HC RX REV CODE- 250/637: Performed by: NURSE PRACTITIONER

## 2017-04-19 PROCEDURE — 94640 AIRWAY INHALATION TREATMENT: CPT

## 2017-04-19 PROCEDURE — 85025 COMPLETE CBC W/AUTO DIFF WBC: CPT | Performed by: INTERNAL MEDICINE

## 2017-04-19 RX ORDER — MORPHINE SULFATE 2 MG/ML
1 INJECTION, SOLUTION INTRAMUSCULAR; INTRAVENOUS ONCE
Status: COMPLETED | OUTPATIENT
Start: 2017-04-19 | End: 2017-04-19

## 2017-04-19 RX ORDER — MORPHINE SULFATE 4 MG/ML
INJECTION, SOLUTION INTRAMUSCULAR; INTRAVENOUS
Status: COMPLETED
Start: 2017-04-19 | End: 2017-04-19

## 2017-04-19 RX ORDER — ACETAMINOPHEN 325 MG/1
650 TABLET ORAL
Status: DISCONTINUED | OUTPATIENT
Start: 2017-04-19 | End: 2017-04-24 | Stop reason: HOSPADM

## 2017-04-19 RX ORDER — MORPHINE SULFATE 2 MG/ML
1 INJECTION, SOLUTION INTRAMUSCULAR; INTRAVENOUS ONCE
Status: ACTIVE | OUTPATIENT
Start: 2017-04-19 | End: 2017-04-19

## 2017-04-19 RX ORDER — ALBUTEROL SULFATE 0.83 MG/ML
1.25 SOLUTION RESPIRATORY (INHALATION)
Status: COMPLETED | OUTPATIENT
Start: 2017-04-19 | End: 2017-04-19

## 2017-04-19 RX ORDER — MORPHINE SULFATE 4 MG/ML
INJECTION, SOLUTION INTRAMUSCULAR; INTRAVENOUS
Status: DISPENSED
Start: 2017-04-19 | End: 2017-04-20

## 2017-04-19 RX ORDER — POTASSIUM CHLORIDE 20 MEQ/1
40 TABLET, EXTENDED RELEASE ORAL EVERY 4 HOURS
Status: DISPENSED | OUTPATIENT
Start: 2017-04-19 | End: 2017-04-19

## 2017-04-19 RX ORDER — ALBUTEROL SULFATE 1.25 MG/3ML
1.25 SOLUTION RESPIRATORY (INHALATION)
Status: DISCONTINUED | OUTPATIENT
Start: 2017-04-19 | End: 2017-04-24 | Stop reason: HOSPADM

## 2017-04-19 RX ORDER — MORPHINE SULFATE 2 MG/ML
0.5 INJECTION, SOLUTION INTRAMUSCULAR; INTRAVENOUS
Status: DISCONTINUED | OUTPATIENT
Start: 2017-04-19 | End: 2017-04-20

## 2017-04-19 RX ADMIN — MORPHINE SULFATE 1 MG: 2 INJECTION, SOLUTION INTRAMUSCULAR; INTRAVENOUS at 12:07

## 2017-04-19 RX ADMIN — Medication 10 ML: at 07:38

## 2017-04-19 RX ADMIN — ARFORMOTEROL TARTRATE 15 MCG: 15 SOLUTION RESPIRATORY (INHALATION) at 10:06

## 2017-04-19 RX ADMIN — Medication 2 MG: at 10:44

## 2017-04-19 RX ADMIN — FAMOTIDINE 20 MG: 20 TABLET ORAL at 09:01

## 2017-04-19 RX ADMIN — ALBUTEROL SULFATE 1.25 MG: 1.25 SOLUTION RESPIRATORY (INHALATION) at 10:44

## 2017-04-19 RX ADMIN — BUDESONIDE 500 MCG: 0.5 INHALANT RESPIRATORY (INHALATION) at 09:01

## 2017-04-19 RX ADMIN — IPRATROPIUM BROMIDE AND ALBUTEROL SULFATE 3 ML: 2.5; .5 SOLUTION RESPIRATORY (INHALATION) at 21:15

## 2017-04-19 RX ADMIN — Medication 2 MG: at 00:37

## 2017-04-19 RX ADMIN — ALBUTEROL SULFATE 1.25 MG: 1.25 SOLUTION RESPIRATORY (INHALATION) at 13:36

## 2017-04-19 RX ADMIN — IPRATROPIUM BROMIDE AND ALBUTEROL SULFATE 3 ML: 2.5; .5 SOLUTION RESPIRATORY (INHALATION) at 04:00

## 2017-04-19 RX ADMIN — DOXYCYCLINE 100 MG: 100 INJECTION, POWDER, LYOPHILIZED, FOR SOLUTION INTRAVENOUS at 21:16

## 2017-04-19 RX ADMIN — ALBUTEROL SULFATE 1.25 MG: 2.5 SOLUTION RESPIRATORY (INHALATION) at 11:55

## 2017-04-19 RX ADMIN — METHYLPREDNISOLONE SODIUM SUCCINATE 60 MG: 40 INJECTION, POWDER, FOR SOLUTION INTRAMUSCULAR; INTRAVENOUS at 06:37

## 2017-04-19 RX ADMIN — Medication 2 MG: at 06:37

## 2017-04-19 RX ADMIN — AMLODIPINE BESYLATE 10 MG: 10 TABLET ORAL at 09:01

## 2017-04-19 RX ADMIN — HYDRALAZINE HYDROCHLORIDE 50 MG: 50 TABLET, FILM COATED ORAL at 17:48

## 2017-04-19 RX ADMIN — METHYLPREDNISOLONE SODIUM SUCCINATE 40 MG: 40 INJECTION, POWDER, FOR SOLUTION INTRAMUSCULAR; INTRAVENOUS at 13:32

## 2017-04-19 RX ADMIN — IPRATROPIUM BROMIDE AND ALBUTEROL SULFATE 3 ML: 2.5; .5 SOLUTION RESPIRATORY (INHALATION) at 07:37

## 2017-04-19 RX ADMIN — DOXYCYCLINE 100 MG: 100 INJECTION, POWDER, LYOPHILIZED, FOR SOLUTION INTRAVENOUS at 09:01

## 2017-04-19 RX ADMIN — POTASSIUM CHLORIDE 40 MEQ: 1500 TABLET, EXTENDED RELEASE ORAL at 09:01

## 2017-04-19 RX ADMIN — MORPHINE SULFATE 1 MG: 2 INJECTION, SOLUTION INTRAMUSCULAR; INTRAVENOUS at 14:21

## 2017-04-19 RX ADMIN — IPRATROPIUM BROMIDE AND ALBUTEROL SULFATE 3 ML: 2.5; .5 SOLUTION RESPIRATORY (INHALATION) at 17:43

## 2017-04-19 RX ADMIN — Medication 10 ML: at 13:34

## 2017-04-19 RX ADMIN — HYDRALAZINE HYDROCHLORIDE 50 MG: 50 TABLET, FILM COATED ORAL at 21:16

## 2017-04-19 RX ADMIN — Medication 0.5 MG: at 21:38

## 2017-04-19 RX ADMIN — IPRATROPIUM BROMIDE AND ALBUTEROL SULFATE 3 ML: 2.5; .5 SOLUTION RESPIRATORY (INHALATION) at 12:00

## 2017-04-19 RX ADMIN — METHYLPREDNISOLONE SODIUM SUCCINATE 40 MG: 40 INJECTION, POWDER, FOR SOLUTION INTRAMUSCULAR; INTRAVENOUS at 21:16

## 2017-04-19 RX ADMIN — MONTELUKAST SODIUM 10 MG: 10 TABLET, FILM COATED ORAL at 21:16

## 2017-04-19 RX ADMIN — Medication 1 MG: at 11:51

## 2017-04-19 RX ADMIN — ENOXAPARIN SODIUM 40 MG: 40 INJECTION SUBCUTANEOUS at 13:32

## 2017-04-19 RX ADMIN — FLUOXETINE 60 MG: 20 CAPSULE ORAL at 09:01

## 2017-04-19 RX ADMIN — Medication 0.5 MG: at 17:43

## 2017-04-19 RX ADMIN — ARFORMOTEROL TARTRATE 15 MCG: 15 SOLUTION RESPIRATORY (INHALATION) at 21:23

## 2017-04-19 RX ADMIN — BUDESONIDE 500 MCG: 0.5 INHALANT RESPIRATORY (INHALATION) at 17:43

## 2017-04-19 RX ADMIN — ALBUTEROL SULFATE 1.25 MG: 1.25 SOLUTION RESPIRATORY (INHALATION) at 10:06

## 2017-04-19 RX ADMIN — POTASSIUM CHLORIDE 40 MEQ: 1500 TABLET, EXTENDED RELEASE ORAL at 17:48

## 2017-04-19 RX ADMIN — HYDRALAZINE HYDROCHLORIDE 50 MG: 50 TABLET, FILM COATED ORAL at 09:01

## 2017-04-19 RX ADMIN — IPRATROPIUM BROMIDE AND ALBUTEROL SULFATE 3 ML: 2.5; .5 SOLUTION RESPIRATORY (INHALATION) at 00:37

## 2017-04-19 NOTE — ED NOTES
Patient c/o dyspnea, stating \"I can't breathe. You can't leave me. \" ER physician at bedside, stating to initiate additional nebulizer treatment. Nebulizer treatment initiated. Dr. Eneida Zamora then came to bedside. Patient stating she still can't breath. VSS. Additional nebulizer treatment initiated and respiratory called. Respiratory attempted to initiate BiPap per patient request. Patient removed BiPap immediately after being placed on. Then stated \"I can't breathe. I need to be intubated. \" Dr. Durward Bacon called and notified of patient complaint. Dr. Eneida Zamora request NP Romel [place] to be notified. NP Rendell  notified, stating he will come down to assess patient.

## 2017-04-19 NOTE — ED NOTES
Dr. Geraldine Eisenmenger at bedside with US to place additional PIV access. Patient placed back on BiPap. Tolerating at this time.

## 2017-04-19 NOTE — PROGRESS NOTES
[facility]  Pulmonary, Critical Care, and Sleep Medicine    Name: Beatriz Chavez MRN: [id number]   : 1965 Hospital: [address] [place] [address]   Date: 2017        IMPRESSION:   · Acute/chronic hypoxemic respiratory failure with acute hypercarbia in the setting of COPD/asthma with exacerbation   · COPD with exacerbation/appears to be chronic bronchitis   · Severe asthma  · Chronic hypoxemia on home o2- 2L  · MY on CPAP- noncompliant   · HTN  · Obesity- BMI 45  · Hyperglycemia in the setting of steroid use     Hx of vocal cord dysfunction   Hx of intubation and frequent admissions for respiratory failure  Hx of cocaine and heroin abuse  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 HOB > 30 degrees. Will continue duonebs q4;  brovana;continue pulmicort; Continue IV steroids . CXR nil acute. Encourage incentive spirometry and PEP therapy   · Diuretics have been ordered by the primary team.  · Patient does have B/l ext edema and pain. Will check lower ext doppler   · Check Rapid flu negative. Sputum cx + GPC and GPR. Patient has been started on Doxycycline by the primary team   · Potassium replacement ordered   · Check UDS + opiates   · Check urine pregnancy negative     Subjective/Interval History:     Patient is a 45 y.o. female pmhx of severe asthma, vocal cord dysfunction, COPD, HTN, polysubstance abuse, and chronic hypoxemia admitted for acute exacerbation of COPD/asthma.      No temperatures recorded overnight  Per nursing staff patient was noncompliant and did not wear bipap  BP stable  Per nursing staff no acute events  On my exam patient is sitting up in bed and currently off her NC. SPO2 98%    C/o Wheezing, SOB, cough, sputum, intermittent dyspnea at rest, and musculoskeletal CP    ROS:Constitutional: negative  Eyes: negative  Ears, nose, mouth, throat, and face: negative  Respiratory: positive for cough, sputum, asthma, wheezing or dyspnea on exertion  Cardiovascular: positive for chest pressure/discomfort, orthopnea  Gastrointestinal: negative  Genitourinary:positive for urinary incontinence  Integument/breast: negative  Hematologic/lymphatic: negative  Musculoskeletal:positive for B/l lower ext leg pain  Neurological: negative  Behavioral/Psych: negative  Endocrine: negative    Objective:   Vital Signs:    Visit Vitals    /85    Pulse 95    Temp 97.6 °F (36.4 °C)    Resp 21    Wt 113.4 kg (250 lb)    SpO2 98%    BMI 45.73 kg/m2       O2 Device: Room air   O2 Flow Rate (L/min): 4 l/min   Temp (24hrs), Av.6 °F (36.4 °C), Min:97.6 °F (36.4 °C), Max:97.6 °F (36.4 °C)       Intake/Output:   Last shift:         Last 3 shifts:  1901 -  0700  In: -   Out: 675 [Urine:675]    Intake/Output Summary (Last 24 hours) at 17 0843  Last data filed at 17 1715   Gross per 24 hour   Intake                0 ml   Output              675 ml   Net             -675 ml        Physical Exam:    General: in no apparent distress, non-toxic, alert, oriented times 3, normal vitals and cooperative   HEENT: Normal   Neck: No abnormally enlarged lymph nodes. Chest: normal   Lungs: decreased air exchange bibasilar and wheezes   Heart: S1S2 present   Abdomen: abdomen is soft without significant tenderness, masses, organomegaly or guarding   Extremity: BLE 1+ edema   Neuro: alert   Skin: Skin color, texture, turgor normal. No rashes or lesions        DATA:  Labs:  Recent Labs      17   0423  17   1044   WBC  8.3  7.8   HGB  12.8  13.1   HCT  40.6  41.7   PLT  212  201     Recent Labs      17   0423  17   1044   NA  141  141   K  3.2*  3.2*   CL  106  105   CO2  26  29   GLU  142*  103*   BUN  11  9   CREA  1.00  1.11   CA  8.8  9.0   MG  2.1   --    ALB   --   3.3*   SGOT   --   14*   ALT   --   24   INR   --   1.1     No results for input(s): PH, PCO2, PO2, HCO3, FIO2 in the last 72 hours.     Imaging:    CXR Results  (Last 48 hours)               04/19/17 0400  XR CHEST PORT Final result    Impression:  IMPRESSION:       1. No infiltrate or consolidation. 2.  Prominence of the cardiac silhouette. Narrative:  PROCEDURE:  Chest Portable       CPT code 39391       INDICATION:  COPD, asthma, hypoxemia        COMPARISON:  4/18/17. FINDINGS:  No infiltrate or consolidation is identified. Both lungs are   hyperinflated. The cardiac silhouette is mild to moderately prominent. No   significant interval changes are observed except for slight increased prominence   of the cardiac silhouette. No pleural effusion is noted. The cardiac   silhouette, mediastinum and hilar regions are unremarkable.                         Consuelo Mel, NP

## 2017-04-19 NOTE — ED NOTES
Report received from [facility]. Assuming care of patient at this time.

## 2017-04-19 NOTE — ED NOTES
Bedside and Verbal shift change report given to Raghav Jerome (oncoming nurse) by Adilia Mitchell RN (offgoing nurse). Report included the following information SBAR, Kardex, Intake/Output and MAR.

## 2017-04-19 NOTE — ED NOTES
Bedside report received from Filemon Quintanilla RN, pt lying on stretcher without any respiratory distress. Will continue to monitor pt's status.

## 2017-04-19 NOTE — PROGRESS NOTES
SUBJECTIVE:    SOB present. Epigastric pain. Generalized bodyaches. No cough. No N/V. Wants to go on BiPAP    OBJECTIVE:    Visit Vitals    /71    Pulse (!) 101    Temp 97.6 °F (36.4 °C)    Resp 23    Wt 113.4 kg (250 lb)    SpO2 98%    BMI 45.73 kg/m2     HEENT: No pallor. Neck: No JVD. Trachea is midline  CVS: Tachycardia  RS: Diminished BS bilaterally with wheezes  GI: ND, Soft, BS +  Extremities: no pedal edema  General: NAD, AWAKE  CNS: moves all extremities    ASSESSMENT:    1. Acute on chronic hypoxemic respiratory failure with acute hypercarbia in the setting of COPD/asthma with exacerbation   2. COPD with exacerbation  3. Severe asthma  4. Chronic hypoxemia on home o2- 2L  5. MY on CPAP- noncompliant   6. HTN  7. Obesity- BMI 45  8. Hyperglycemia in the setting of steroid use  9. Hx of vocal cord dysfunction   10. Hx of intubation and frequent admissions for respiratory failure  11. Hx of cocaine and heroin abuse  12.  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     PLAN:    Continue BiPAP AND OXYGEN  Doppler is normal  Cont steroid and antibiotic  CXR report reviewed  Pulmonology to follow    --> total time greater than 35 minutes    CMP:   Lab Results   Component Value Date/Time     04/19/2017 04:23 AM    K 3.2 (L) 04/19/2017 04:23 AM     04/19/2017 04:23 AM    CO2 26 04/19/2017 04:23 AM    AGAP 9 04/19/2017 04:23 AM     (H) 04/19/2017 04:23 AM    BUN 11 04/19/2017 04:23 AM    CREA 1.00 04/19/2017 04:23 AM    GFRAA >60 04/19/2017 04:23 AM    GFRNA 58 (L) 04/19/2017 04:23 AM    CA 8.8 04/19/2017 04:23 AM    MG 2.1 04/19/2017 04:23 AM     CBC:   Lab Results   Component Value Date/Time    WBC 8.3 04/19/2017 04:23 AM    HGB 12.8 04/19/2017 04:23 AM    HCT 40.6 04/19/2017 04:23 AM     04/19/2017 04:23 AM

## 2017-04-20 LAB
ANION GAP BLD CALC-SCNC: 7 MMOL/L (ref 3–18)
BACTERIA SPEC CULT: NORMAL
BASOPHILS # BLD AUTO: 0 K/UL (ref 0–0.06)
BASOPHILS # BLD: 0 % (ref 0–3)
BUN SERPL-MCNC: 16 MG/DL (ref 7–18)
BUN/CREAT SERPL: 16 (ref 12–20)
CALCIUM SERPL-MCNC: 8.9 MG/DL (ref 8.5–10.1)
CHLORIDE SERPL-SCNC: 108 MMOL/L (ref 100–108)
CO2 SERPL-SCNC: 28 MMOL/L (ref 21–32)
CREAT SERPL-MCNC: 1.03 MG/DL (ref 0.6–1.3)
DIFFERENTIAL METHOD BLD: ABNORMAL
EOSINOPHIL # BLD: 0 K/UL (ref 0–0.4)
EOSINOPHIL NFR BLD: 0 % (ref 0–5)
ERYTHROCYTE [DISTWIDTH] IN BLOOD BY AUTOMATED COUNT: 16.4 % (ref 11.6–14.5)
GLUCOSE BLD STRIP.AUTO-MCNC: 116 MG/DL (ref 70–110)
GLUCOSE BLD STRIP.AUTO-MCNC: 119 MG/DL (ref 70–110)
GLUCOSE SERPL-MCNC: 145 MG/DL (ref 74–99)
GRAM STN SPEC: NORMAL
HCT VFR BLD AUTO: 39.4 % (ref 35–45)
HGB BLD-MCNC: 12.5 G/DL (ref 12–16)
LYMPHOCYTES # BLD AUTO: 11 % (ref 20–51)
LYMPHOCYTES # BLD: 1.3 K/UL (ref 0.8–3.5)
MAGNESIUM SERPL-MCNC: 2.3 MG/DL (ref 1.6–2.6)
MCH RBC QN AUTO: 26.9 PG (ref 24–34)
MCHC RBC AUTO-ENTMCNC: 31.7 G/DL (ref 31–37)
MCV RBC AUTO: 84.7 FL (ref 74–97)
MONOCYTES # BLD: 0.2 K/UL (ref 0–1)
MONOCYTES NFR BLD AUTO: 2 % (ref 2–9)
NEUTS BAND NFR BLD MANUAL: 2 % (ref 0–5)
NEUTS SEG # BLD: 10.6 K/UL (ref 1.8–8)
NEUTS SEG NFR BLD AUTO: 85 % (ref 42–75)
PLATELET # BLD AUTO: 223 K/UL (ref 135–420)
PLATELET COMMENTS,PCOM: ABNORMAL
PMV BLD AUTO: 11.8 FL (ref 9.2–11.8)
POTASSIUM SERPL-SCNC: 3.8 MMOL/L (ref 3.5–5.5)
RBC # BLD AUTO: 4.65 M/UL (ref 4.2–5.3)
RBC MORPH BLD: ABNORMAL
SERVICE CMNT-IMP: NORMAL
SODIUM SERPL-SCNC: 143 MMOL/L (ref 136–145)
WBC # BLD AUTO: 12.1 K/UL (ref 4.6–13.2)

## 2017-04-20 PROCEDURE — 74011000250 HC RX REV CODE- 250: Performed by: NURSE PRACTITIONER

## 2017-04-20 PROCEDURE — 85025 COMPLETE CBC W/AUTO DIFF WBC: CPT | Performed by: NURSE PRACTITIONER

## 2017-04-20 PROCEDURE — 74011250636 HC RX REV CODE- 250/636: Performed by: NURSE PRACTITIONER

## 2017-04-20 PROCEDURE — 96372 THER/PROPH/DIAG INJ SC/IM: CPT

## 2017-04-20 PROCEDURE — 74011250637 HC RX REV CODE- 250/637: Performed by: FAMILY MEDICINE

## 2017-04-20 PROCEDURE — 83735 ASSAY OF MAGNESIUM: CPT | Performed by: NURSE PRACTITIONER

## 2017-04-20 PROCEDURE — 96366 THER/PROPH/DIAG IV INF ADDON: CPT

## 2017-04-20 PROCEDURE — 74011250637 HC RX REV CODE- 250/637: Performed by: INTERNAL MEDICINE

## 2017-04-20 PROCEDURE — 74011250636 HC RX REV CODE- 250/636: Performed by: INTERNAL MEDICINE

## 2017-04-20 PROCEDURE — 94640 AIRWAY INHALATION TREATMENT: CPT

## 2017-04-20 PROCEDURE — 96376 TX/PRO/DX INJ SAME DRUG ADON: CPT

## 2017-04-20 PROCEDURE — 82962 GLUCOSE BLOOD TEST: CPT

## 2017-04-20 PROCEDURE — 74011000250 HC RX REV CODE- 250: Performed by: INTERNAL MEDICINE

## 2017-04-20 PROCEDURE — 74011000258 HC RX REV CODE- 258: Performed by: INTERNAL MEDICINE

## 2017-04-20 PROCEDURE — 99218 HC RM OBSERVATION: CPT

## 2017-04-20 PROCEDURE — 80048 BASIC METABOLIC PNL TOTAL CA: CPT | Performed by: NURSE PRACTITIONER

## 2017-04-20 RX ORDER — ALPRAZOLAM 0.5 MG/1
0.25 TABLET ORAL
Status: DISCONTINUED | OUTPATIENT
Start: 2017-04-20 | End: 2017-04-24 | Stop reason: HOSPADM

## 2017-04-20 RX ORDER — OXYCODONE AND ACETAMINOPHEN 5; 325 MG/1; MG/1
1-2 TABLET ORAL
Status: DISCONTINUED | OUTPATIENT
Start: 2017-04-20 | End: 2017-04-24 | Stop reason: HOSPADM

## 2017-04-20 RX ORDER — ALBUTEROL SULFATE 0.83 MG/ML
SOLUTION RESPIRATORY (INHALATION)
Status: DISPENSED
Start: 2017-04-20 | End: 2017-04-20

## 2017-04-20 RX ADMIN — FLUOXETINE 60 MG: 20 CAPSULE ORAL at 08:52

## 2017-04-20 RX ADMIN — HYDRALAZINE HYDROCHLORIDE 50 MG: 50 TABLET, FILM COATED ORAL at 08:53

## 2017-04-20 RX ADMIN — IPRATROPIUM BROMIDE AND ALBUTEROL SULFATE 3 ML: 2.5; .5 SOLUTION RESPIRATORY (INHALATION) at 10:16

## 2017-04-20 RX ADMIN — ALPRAZOLAM 0.25 MG: 0.5 TABLET ORAL at 15:42

## 2017-04-20 RX ADMIN — OXYCODONE HYDROCHLORIDE AND ACETAMINOPHEN 2 TABLET: 5; 325 TABLET ORAL at 10:36

## 2017-04-20 RX ADMIN — OXYCODONE HYDROCHLORIDE AND ACETAMINOPHEN 2 TABLET: 5; 325 TABLET ORAL at 15:13

## 2017-04-20 RX ADMIN — IPRATROPIUM BROMIDE AND ALBUTEROL SULFATE 3 ML: 2.5; .5 SOLUTION RESPIRATORY (INHALATION) at 07:49

## 2017-04-20 RX ADMIN — Medication 0.5 MG: at 04:32

## 2017-04-20 RX ADMIN — FAMOTIDINE 20 MG: 20 TABLET ORAL at 08:52

## 2017-04-20 RX ADMIN — DOXYCYCLINE 100 MG: 100 INJECTION, POWDER, LYOPHILIZED, FOR SOLUTION INTRAVENOUS at 08:57

## 2017-04-20 RX ADMIN — METHYLPREDNISOLONE SODIUM SUCCINATE 40 MG: 40 INJECTION, POWDER, FOR SOLUTION INTRAMUSCULAR; INTRAVENOUS at 05:12

## 2017-04-20 RX ADMIN — ENOXAPARIN SODIUM 40 MG: 40 INJECTION SUBCUTANEOUS at 15:14

## 2017-04-20 RX ADMIN — IPRATROPIUM BROMIDE AND ALBUTEROL SULFATE 3 ML: 2.5; .5 SOLUTION RESPIRATORY (INHALATION) at 05:12

## 2017-04-20 RX ADMIN — IPRATROPIUM BROMIDE AND ALBUTEROL SULFATE 3 ML: 2.5; .5 SOLUTION RESPIRATORY (INHALATION) at 15:13

## 2017-04-20 RX ADMIN — OXYCODONE HYDROCHLORIDE AND ACETAMINOPHEN 2 TABLET: 5; 325 TABLET ORAL at 23:56

## 2017-04-20 RX ADMIN — MONTELUKAST SODIUM 10 MG: 10 TABLET, FILM COATED ORAL at 23:57

## 2017-04-20 RX ADMIN — BUMETANIDE 0.5 MG: 1 TABLET ORAL at 15:14

## 2017-04-20 RX ADMIN — BUDESONIDE 500 MCG: 0.5 INHALANT RESPIRATORY (INHALATION) at 18:43

## 2017-04-20 RX ADMIN — Medication 10 ML: at 15:28

## 2017-04-20 RX ADMIN — AMLODIPINE BESYLATE 10 MG: 10 TABLET ORAL at 08:52

## 2017-04-20 RX ADMIN — IPRATROPIUM BROMIDE AND ALBUTEROL SULFATE 3 ML: 2.5; .5 SOLUTION RESPIRATORY (INHALATION) at 02:33

## 2017-04-20 RX ADMIN — METHYLPREDNISOLONE SODIUM SUCCINATE 40 MG: 40 INJECTION, POWDER, FOR SOLUTION INTRAMUSCULAR; INTRAVENOUS at 23:56

## 2017-04-20 RX ADMIN — HYDRALAZINE HYDROCHLORIDE 50 MG: 50 TABLET, FILM COATED ORAL at 15:14

## 2017-04-20 RX ADMIN — ARFORMOTEROL TARTRATE 15 MCG: 15 SOLUTION RESPIRATORY (INHALATION) at 10:41

## 2017-04-20 RX ADMIN — HYDRALAZINE HYDROCHLORIDE 50 MG: 50 TABLET, FILM COATED ORAL at 23:56

## 2017-04-20 RX ADMIN — BUDESONIDE 500 MCG: 0.5 INHALANT RESPIRATORY (INHALATION) at 08:54

## 2017-04-20 NOTE — PROGRESS NOTES
[facility]  Pulmonary, Critical Care, and Sleep Medicine    Name: Nicole Grier MRN: [id number]   : 1965 Hospital: [address] [place] [address]   Date: 2017        IMPRESSION:   · Acute/chronic hypoxemic respiratory failure with acute hypercarbia in the setting of COPD/asthma with exacerbation   · COPD with exacerbation/appears to be chronic bronchitis   · Acute bronchitis   · Severe asthma  · Chronic hypoxemia on home o2- 2L  · MY on CPAP- noncompliant   · HTN  · Obesity- BMI 45  · Hyperglycemia in the setting of steroid use     Hx of vocal cord dysfunction   Hx of intubation and frequent admissions for respiratory failure  Hx of cocaine and heroin abuse  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 HOB > 30 degrees. Will continue duonebs q4;  brovana;continue pulmicort; Continue IV steroids( wean to 40 mg bid) . Encourage incentive spirometry and PEP therapy   · Diuretics have been ordered by the primary team.  · Lower ext doppler negative for DVT  · Check Rapid flu negative. Sputum cx with normal jimena. · Abx- doxycycline  · Consider adding cough expectorant       Subjective/Interval History:     Patient is a 45 y.o. female pmhx of severe asthma, vocal cord dysfunction, COPD, HTN, polysubstance abuse, and chronic hypoxemia admitted for acute exacerbation of COPD/asthma. No temperatures recorded overnight  She did not wear BiPAP  BP stable  Per nursing staff no acute events. Although the nursing staff report compliance issues.    Patient is also very upset that her pain medications have been decreased   On 4 L NC      C/o Wheezing, SOB, cough, sputum, intermittent dyspnea at rest, and musculoskeletal CP    ROS:Constitutional: negative  Eyes: negative  Ears, nose, mouth, throat, and face: negative  Respiratory: positive for cough, sputum, asthma, wheezing or dyspnea on exertion  Cardiovascular: positive for chest pressure/discomfort, orthopnea  Gastrointestinal: negative  Genitourinary:negative  Integument/breast: negative  Hematologic/lymphatic: negative  Musculoskeletal:positive for B/l lower ext leg pain  Neurological: negative  Behavioral/Psych: negative  Endocrine: negative    Objective:   Vital Signs:    Visit Vitals    /67    Pulse (!) 106    Temp 97.6 °F (36.4 °C)    Resp 21    Wt 113.4 kg (250 lb)    SpO2 94%    BMI 45.73 kg/m2       O2 Device: Nasal cannula   O2 Flow Rate (L/min): 4 l/min   No data recorded. Intake/Output:   Last shift:         Last 3 shifts:    No intake or output data in the 24 hours ending 04/20/17 0850     Physical Exam:    General: in no apparent distress, non-toxic, alert, oriented times 3, normal vitals and cooperative   HEENT: Normal   Neck: No abnormally enlarged lymph nodes. Chest: normal   Lungs: bilateral wheezes   Heart: S1S2 present   Abdomen: abdomen is soft without significant tenderness, masses, organomegaly or guarding   Extremity: BLE 1+ edema   Neuro: alert   Skin: Skin color, texture, turgor normal. No rashes or lesions        DATA:  Labs:  Recent Labs      04/20/17   0435  04/19/17   0423  04/18/17   1044   WBC  12.1  8.3  7.8   HGB  12.5  12.8  13.1   HCT  39.4  40.6  41.7   PLT  223  212  201     Recent Labs      04/20/17   0435  04/19/17   0423  04/18/17   1044   NA  143  141  141   K  3.8  3.2*  3.2*   CL  108  106  105   CO2  28  26  29   GLU  145*  142*  103*   BUN  16  11  9   CREA  1.03  1.00  1.11   CA  8.9  8.8  9.0   MG  2.3  2.1   --    ALB   --    --   3.3*   SGOT   --    --   14*   ALT   --    --   24   INR   --    --   1.1     No results for input(s): PH, PCO2, PO2, HCO3, FIO2 in the last 72 hours. Imaging:    CXR Results  (Last 48 hours)               04/19/17 0400  XR CHEST PORT Final result    Impression:  IMPRESSION:       1. No infiltrate or consolidation. 2.  Prominence of the cardiac silhouette.             Narrative:  PROCEDURE:  Chest Portable CPT code 04980       INDICATION:  COPD, asthma, hypoxemia        COMPARISON:  4/18/17. FINDINGS:  No infiltrate or consolidation is identified. Both lungs are   hyperinflated. The cardiac silhouette is mild to moderately prominent. No   significant interval changes are observed except for slight increased prominence   of the cardiac silhouette. No pleural effusion is noted. The cardiac   silhouette, mediastinum and hilar regions are unremarkable.                         Gracie Marcos, MELINDA

## 2017-04-20 NOTE — ED NOTES
Family at the bedside, patient talking and laughing with family members, no signs of acu=te distress noted. Meat tray provided to patient .

## 2017-04-20 NOTE — ED NOTES
Patient requesting her home xanax, patient informed there are no orders currently. Dr. Manpreet Albarado called awaiting response.

## 2017-04-20 NOTE — ED NOTES
Patient continuously disconnecting self from the monitor, reminded not to disconnect monitor. Patient verbalized understanding.

## 2017-04-20 NOTE — ED NOTES
Bedside and Verbal shift change report given to Tom Santillan (oncoming nurse) by Savannah Marion RN (offgoing nurse). Report included the following information SBAR, Kardex and ED Summary.

## 2017-04-20 NOTE — ED NOTES
Bedside shift change report given to ROSALINE Pollack  (oncoming nurse) by Deidre Woods RN   (offgoing nurse). Report included the following information SBAR, ED Summary, MAR and Recent Results.

## 2017-04-20 NOTE — ED NOTES
Assumed care of patient requesting a meal tray, provided a meal tray. Family member at the bedside. No signs of distress noted.

## 2017-04-20 NOTE — ED NOTES
Bedside report received from [facility]. Assumed care of the patient. Received patient sitting up on bedside commode. Awake, alert, oriented X 3. The patient is yelling, \"I need a breathing treatment! \"  Patient greeted / introduced myself as their primary nurse. Encouraged to voice any concerns, and all questions/concerns addressed. Assessment in progress. Patient fall risk assessed, with prevention measures in place, to include bed in lowest position with casters locked and rail up, call bell within reach, frequent toileting in progress, lights on, pathway to bathroom free from obstacles. Patient instructed to call for assist OOB at all times. Floor dry. Instructed that staff will make hourly rounds to provide reassessments in pain control, concerns, toileting, and any other updates in care. The patient is awaiting inpatient bed. Explanation of wait provided to the patient. Hand hygiene maintained prior to and after patient/staff interaction.

## 2017-04-20 NOTE — PROGRESS NOTES
[facility]  Progress Note    Patient: Nicole Grier Age: 45 y.o. : 1965 MR#: [id number] SSN: [id number]  Date/Time: 2017 1:07 PM    Subjective/24-hour events:     Feeling a little better, but still wheezing significantly. Assessment:   Acute on chronic hypoxemic respiratory failure  COPD with acute exacerbation  Acute bronchitis  Severe, persistent asthma  MY   HTN  Morbid obesity    Plan:  Continue nebs, inaled/IV steroid as ordered. Antibiotic therapy as ordered - doxy. Adjust analgesic. Add xanax. Supportive care o/w. Case discussed with:  [x]Patient  []Family  []Nursing  []Case Management  DVT Prophylaxis:  [x]Lovenox  []Hep SQ  []SCDs  []Coumadin   []On Heparin gtt    Objective:   VS:   Visit Vitals    /61    Pulse (!) 105    Temp 97.6 °F (36.4 °C)    Resp 25    Wt 113.4 kg (250 lb)    SpO2 99%    BMI 45.73 kg/m2      Tmax/24hrs: No data recorded. No intake or output data in the 24 hours ending 17 1307    General:  In NAD. Cardiovascular:  Regular, mildly tachy. Pulmonary:  Diffuse wheezes bilaterally. SOB with conversation. GI:  Abdomen soft. Extremities:  Warm, no ischemia. Neuro:  Awake and alert.   Motor grossly nonfocal.    Labs:    Recent Results (from the past 24 hour(s))   GLUCOSE, POC    Collection Time: 17  5:48 PM   Result Value Ref Range    Glucose (POC) 128 (H) 70 - 110 mg/dL   GLUCOSE, POC    Collection Time: 17  9:37 PM   Result Value Ref Range    Glucose (POC) 144 (H) 70 - 110 mg/dL   CBC WITH AUTOMATED DIFF    Collection Time: 17  4:35 AM   Result Value Ref Range    WBC 12.1 4.6 - 13.2 K/uL    RBC 4.65 4.20 - 5.30 M/uL    HGB 12.5 12.0 - 16.0 g/dL    HCT 39.4 35.0 - 45.0 %    MCV 84.7 74.0 - 97.0 FL    MCH 26.9 24.0 - 34.0 PG    MCHC 31.7 31.0 - 37.0 g/dL    RDW 16.4 (H) 11.6 - 14.5 %    PLATELET 062 524 - 014 K/uL    MPV 11.8 9.2 - 11.8 FL    NEUTROPHILS 85 (H) 42 - 75 %    BAND NEUTROPHILS 2 0 - 5 %    LYMPHOCYTES 11 (L) 20 - 51 %    MONOCYTES 2 2 - 9 %    EOSINOPHILS 0 0 - 5 %    BASOPHILS 0 0 - 3 %    ABS. NEUTROPHILS 10.6 (H) 1.8 - 8.0 K/UL    ABS. LYMPHOCYTES 1.3 0.8 - 3.5 K/UL    ABS. MONOCYTES 0.2 0 - 1.0 K/UL    ABS. EOSINOPHILS 0.0 0.0 - 0.4 K/UL    ABS.  BASOPHILS 0.0 0.0 - 0.06 K/UL    DF MANUAL      PLATELET COMMENTS ADEQUATE PLATELETS      RBC COMMENTS ANISOCYTOSIS  1+       METABOLIC PANEL, BASIC    Collection Time: 04/20/17  4:35 AM   Result Value Ref Range    Sodium 143 136 - 145 mmol/L    Potassium 3.8 3.5 - 5.5 mmol/L    Chloride 108 100 - 108 mmol/L    CO2 28 21 - 32 mmol/L    Anion gap 7 3.0 - 18 mmol/L    Glucose 145 (H) 74 - 99 mg/dL    BUN 16 7.0 - 18 MG/DL    Creatinine 1.03 0.6 - 1.3 MG/DL    BUN/Creatinine ratio 16 12 - 20      GFR est AA >60 >60 ml/min/1.73m2    GFR est non-AA 56 (L) >60 ml/min/1.73m2    Calcium 8.9 8.5 - 10.1 MG/DL   MAGNESIUM    Collection Time: 04/20/17  4:35 AM   Result Value Ref Range    Magnesium 2.3 1.6 - 2.6 mg/dL       Signed By: Emelia Hoffman MD     April 20, 2017 1:07 PM

## 2017-04-20 NOTE — ED NOTES
Pt resting on stretcher with eyes closed, observed rise and fall of chest.  Pt easily aroused by name calling. NAD noted, no new complaints voiced at this time. Will continue to monitor.

## 2017-04-21 ENCOUNTER — APPOINTMENT (OUTPATIENT)
Dept: INTERVENTIONAL RADIOLOGY/VASCULAR | Age: 52
DRG: 140 | End: 2017-04-21
Attending: NURSE PRACTITIONER
Payer: MEDICAID

## 2017-04-21 PROBLEM — J45.901 ACUTE ASTHMA EXACERBATION: Status: ACTIVE | Noted: 2017-04-21

## 2017-04-21 LAB
ANION GAP BLD CALC-SCNC: 7 MMOL/L (ref 3–18)
BASOPHILS # BLD AUTO: 0 K/UL (ref 0–0.06)
BASOPHILS # BLD: 0 % (ref 0–3)
BUN SERPL-MCNC: 18 MG/DL (ref 7–18)
BUN/CREAT SERPL: 16 (ref 12–20)
CALCIUM SERPL-MCNC: 8.6 MG/DL (ref 8.5–10.1)
CHLORIDE SERPL-SCNC: 105 MMOL/L (ref 100–108)
CO2 SERPL-SCNC: 28 MMOL/L (ref 21–32)
CREAT SERPL-MCNC: 1.12 MG/DL (ref 0.6–1.3)
DIFFERENTIAL METHOD BLD: ABNORMAL
EOSINOPHIL # BLD: 0 K/UL (ref 0–0.4)
EOSINOPHIL NFR BLD: 0 % (ref 0–5)
ERYTHROCYTE [DISTWIDTH] IN BLOOD BY AUTOMATED COUNT: 16.6 % (ref 11.6–14.5)
GLUCOSE BLD STRIP.AUTO-MCNC: 110 MG/DL (ref 70–110)
GLUCOSE BLD STRIP.AUTO-MCNC: 123 MG/DL (ref 70–110)
GLUCOSE BLD STRIP.AUTO-MCNC: 129 MG/DL (ref 70–110)
GLUCOSE BLD STRIP.AUTO-MCNC: 133 MG/DL (ref 70–110)
GLUCOSE SERPL-MCNC: 125 MG/DL (ref 74–99)
HCT VFR BLD AUTO: 42.3 % (ref 35–45)
HGB BLD-MCNC: 13.2 G/DL (ref 12–16)
LYMPHOCYTES # BLD AUTO: 11 % (ref 20–51)
LYMPHOCYTES # BLD: 1.1 K/UL (ref 0.8–3.5)
MAGNESIUM SERPL-MCNC: 2.1 MG/DL (ref 1.6–2.6)
MCH RBC QN AUTO: 27 PG (ref 24–34)
MCHC RBC AUTO-ENTMCNC: 31.2 G/DL (ref 31–37)
MCV RBC AUTO: 86.7 FL (ref 74–97)
MONOCYTES # BLD: 0.5 K/UL (ref 0–1)
MONOCYTES NFR BLD AUTO: 5 % (ref 2–9)
NEUTS BAND NFR BLD MANUAL: 1 % (ref 0–5)
NEUTS SEG # BLD: 8.1 K/UL (ref 1.8–8)
NEUTS SEG NFR BLD AUTO: 81 % (ref 42–75)
PLATELET # BLD AUTO: 243 K/UL (ref 135–420)
PLATELET COMMENTS,PCOM: ABNORMAL
PMV BLD AUTO: 11.4 FL (ref 9.2–11.8)
POTASSIUM SERPL-SCNC: 3.7 MMOL/L (ref 3.5–5.5)
RBC # BLD AUTO: 4.88 M/UL (ref 4.2–5.3)
RBC MORPH BLD: ABNORMAL
RBC MORPH BLD: ABNORMAL
SODIUM SERPL-SCNC: 140 MMOL/L (ref 136–145)
WBC # BLD AUTO: 9.9 K/UL (ref 4.6–13.2)
WBC OTHER # BLD MANUAL: 2 10*3/UL

## 2017-04-21 PROCEDURE — 02HV33Z INSERTION OF INFUSION DEVICE INTO SUPERIOR VENA CAVA, PERCUTANEOUS APPROACH: ICD-10-PCS | Performed by: RADIOLOGY

## 2017-04-21 PROCEDURE — 80048 BASIC METABOLIC PNL TOTAL CA: CPT | Performed by: NURSE PRACTITIONER

## 2017-04-21 PROCEDURE — 83735 ASSAY OF MAGNESIUM: CPT | Performed by: NURSE PRACTITIONER

## 2017-04-21 PROCEDURE — 74011636637 HC RX REV CODE- 636/637: Performed by: NURSE PRACTITIONER

## 2017-04-21 PROCEDURE — 82962 GLUCOSE BLOOD TEST: CPT

## 2017-04-21 PROCEDURE — 36415 COLL VENOUS BLD VENIPUNCTURE: CPT | Performed by: NURSE PRACTITIONER

## 2017-04-21 PROCEDURE — 74011000258 HC RX REV CODE- 258: Performed by: INTERNAL MEDICINE

## 2017-04-21 PROCEDURE — 99218 HC RM OBSERVATION: CPT

## 2017-04-21 PROCEDURE — 74011250636 HC RX REV CODE- 250/636: Performed by: INTERNAL MEDICINE

## 2017-04-21 PROCEDURE — 74011000250 HC RX REV CODE- 250: Performed by: INTERNAL MEDICINE

## 2017-04-21 PROCEDURE — 74011250637 HC RX REV CODE- 250/637: Performed by: INTERNAL MEDICINE

## 2017-04-21 PROCEDURE — 36569 INSJ PICC 5 YR+ W/O IMAGING: CPT

## 2017-04-21 PROCEDURE — 74011250637 HC RX REV CODE- 250/637: Performed by: FAMILY MEDICINE

## 2017-04-21 PROCEDURE — 94640 AIRWAY INHALATION TREATMENT: CPT

## 2017-04-21 PROCEDURE — 85025 COMPLETE CBC W/AUTO DIFF WBC: CPT | Performed by: NURSE PRACTITIONER

## 2017-04-21 PROCEDURE — 74011000250 HC RX REV CODE- 250: Performed by: NURSE PRACTITIONER

## 2017-04-21 PROCEDURE — 65270000029 HC RM PRIVATE

## 2017-04-21 RX ORDER — ZOLPIDEM TARTRATE 5 MG/1
5 TABLET ORAL
Status: DISCONTINUED | OUTPATIENT
Start: 2017-04-21 | End: 2017-04-24 | Stop reason: HOSPADM

## 2017-04-21 RX ORDER — PREDNISONE 20 MG/1
40 TABLET ORAL DAILY
Status: DISCONTINUED | OUTPATIENT
Start: 2017-04-21 | End: 2017-04-22

## 2017-04-21 RX ADMIN — ARFORMOTEROL TARTRATE 15 MCG: 15 SOLUTION RESPIRATORY (INHALATION) at 08:06

## 2017-04-21 RX ADMIN — HYDRALAZINE HYDROCHLORIDE 50 MG: 50 TABLET, FILM COATED ORAL at 08:31

## 2017-04-21 RX ADMIN — IPRATROPIUM BROMIDE AND ALBUTEROL SULFATE 3 ML: 2.5; .5 SOLUTION RESPIRATORY (INHALATION) at 12:35

## 2017-04-21 RX ADMIN — Medication 10 ML: at 14:34

## 2017-04-21 RX ADMIN — IPRATROPIUM BROMIDE AND ALBUTEROL SULFATE 3 ML: 2.5; .5 SOLUTION RESPIRATORY (INHALATION) at 08:06

## 2017-04-21 RX ADMIN — FLUOXETINE 60 MG: 20 CAPSULE ORAL at 08:30

## 2017-04-21 RX ADMIN — Medication 10 ML: at 22:00

## 2017-04-21 RX ADMIN — BUDESONIDE 500 MCG: 0.5 INHALANT RESPIRATORY (INHALATION) at 08:06

## 2017-04-21 RX ADMIN — IPRATROPIUM BROMIDE AND ALBUTEROL SULFATE 3 ML: 2.5; .5 SOLUTION RESPIRATORY (INHALATION) at 00:11

## 2017-04-21 RX ADMIN — PREDNISONE 40 MG: 20 TABLET ORAL at 10:07

## 2017-04-21 RX ADMIN — ALPRAZOLAM 0.25 MG: 0.5 TABLET ORAL at 18:13

## 2017-04-21 RX ADMIN — FAMOTIDINE 20 MG: 20 TABLET ORAL at 08:31

## 2017-04-21 RX ADMIN — MONTELUKAST SODIUM 10 MG: 10 TABLET, FILM COATED ORAL at 21:51

## 2017-04-21 RX ADMIN — ENOXAPARIN SODIUM 40 MG: 40 INJECTION SUBCUTANEOUS at 13:42

## 2017-04-21 RX ADMIN — BUDESONIDE 500 MCG: 0.5 INHALANT RESPIRATORY (INHALATION) at 20:26

## 2017-04-21 RX ADMIN — Medication 10 ML: at 05:13

## 2017-04-21 RX ADMIN — DOXYCYCLINE 100 MG: 100 INJECTION, POWDER, LYOPHILIZED, FOR SOLUTION INTRAVENOUS at 21:51

## 2017-04-21 RX ADMIN — DOXYCYCLINE 100 MG: 100 INJECTION, POWDER, LYOPHILIZED, FOR SOLUTION INTRAVENOUS at 08:33

## 2017-04-21 RX ADMIN — IPRATROPIUM BROMIDE AND ALBUTEROL SULFATE 3 ML: 2.5; .5 SOLUTION RESPIRATORY (INHALATION) at 04:51

## 2017-04-21 RX ADMIN — OXYCODONE HYDROCHLORIDE AND ACETAMINOPHEN 2 TABLET: 5; 325 TABLET ORAL at 08:30

## 2017-04-21 RX ADMIN — IPRATROPIUM BROMIDE AND ALBUTEROL SULFATE 3 ML: 2.5; .5 SOLUTION RESPIRATORY (INHALATION) at 16:30

## 2017-04-21 RX ADMIN — OXYCODONE HYDROCHLORIDE AND ACETAMINOPHEN 2 TABLET: 5; 325 TABLET ORAL at 22:06

## 2017-04-21 RX ADMIN — IPRATROPIUM BROMIDE AND ALBUTEROL SULFATE 3 ML: 2.5; .5 SOLUTION RESPIRATORY (INHALATION) at 20:26

## 2017-04-21 RX ADMIN — HYDRALAZINE HYDROCHLORIDE 50 MG: 50 TABLET, FILM COATED ORAL at 18:13

## 2017-04-21 RX ADMIN — Medication 10 ML: at 08:32

## 2017-04-21 RX ADMIN — ARFORMOTEROL TARTRATE 15 MCG: 15 SOLUTION RESPIRATORY (INHALATION) at 20:26

## 2017-04-21 RX ADMIN — HYDRALAZINE HYDROCHLORIDE 50 MG: 50 TABLET, FILM COATED ORAL at 21:51

## 2017-04-21 RX ADMIN — OXYCODONE HYDROCHLORIDE AND ACETAMINOPHEN 2 TABLET: 5; 325 TABLET ORAL at 18:12

## 2017-04-21 RX ADMIN — Medication 10 ML: at 00:00

## 2017-04-21 RX ADMIN — OXYCODONE HYDROCHLORIDE AND ACETAMINOPHEN 2 TABLET: 5; 325 TABLET ORAL at 14:34

## 2017-04-21 RX ADMIN — AMLODIPINE BESYLATE 10 MG: 10 TABLET ORAL at 08:32

## 2017-04-21 RX ADMIN — OXYCODONE HYDROCHLORIDE AND ACETAMINOPHEN 2 TABLET: 5; 325 TABLET ORAL at 05:12

## 2017-04-21 RX ADMIN — ALPRAZOLAM 0.25 MG: 0.5 TABLET ORAL at 08:30

## 2017-04-21 RX ADMIN — ZOLPIDEM TARTRATE 5 MG: 5 TABLET ORAL at 21:52

## 2017-04-21 NOTE — PROGRESS NOTES
[facility]  Pulmonary, Critical Care, and Sleep Medicine    Name: Margarita Burger MRN: [id number]   : 1965 Hospital: [address] [place] [address]   Date: 2017        IMPRESSION:   · Acute/chronic hypoxemic respiratory failure with acute hypercarbia in the setting of COPD/asthma with exacerbation   · COPD with exacerbation/appears to be chronic bronchitis   · Acute bronchitis   · Severe asthma  · Chronic hypoxemia on home o2- 2L  · MY on CPAP- noncompliant   · HTN  · Obesity- BMI 45  · Hyperglycemia in the setting of steroid use     Hx of vocal cord dysfunction   Hx of intubation and frequent admissions for respiratory failure  Hx of cocaine and heroin abuse  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 HOB > 30 degrees. Will continue duonebs q4;  brovana;continue pulmicort; Change solumedrol to prednisone 40 mg with taper . Encourage incentive spirometry and PEP therapy   · Diuretics have been ordered by the primary team.  · Lower ext doppler negative for DVT  · Rapid flu negative. Sputum cx with normal jimena. · Abx- doxycycline  · Consider adding cough expectorant    · Patient will need f/u with her pulmonologist on discharge      Subjective/Interval History:     Patient is a 45 y.o. female pmhx of severe asthma, vocal cord dysfunction, COPD, HTN, polysubstance abuse, and chronic hypoxemia admitted for acute exacerbation of COPD/asthma.      afebrile  She did not wear BiPAP  BP stable  Patient states her work of breathing has improved although she is not back to baseline    On 3.5 L NC    C/o Wheezing, SOB, cough, sputum, intermittent dyspnea at rest, and musculoskeletal CP    ROS:Constitutional: negative  Eyes: negative  Ears, nose, mouth, throat, and face: negative  Respiratory: positive for cough, sputum, asthma, wheezing or dyspnea on exertion  Cardiovascular: positive for chest pressure/discomfort, orthopnea  Gastrointestinal: negative  Genitourinary:negative  Integument/breast: negative  Hematologic/lymphatic: negative  Musculoskeletal:positive for B/l lower ext leg pain  Neurological: negative  Behavioral/Psych: negative  Endocrine: negative    Objective:   Vital Signs:    Visit Vitals    /68 (BP 1 Location: Left arm, BP Patient Position: At rest)    Pulse 96    Temp 98.3 °F (36.8 °C)    Resp 22    Wt 113.4 kg (250 lb)    SpO2 99%    BMI 45.73 kg/m2       O2 Device: Nasal cannula   O2 Flow Rate (L/min): 4 l/min   Temp (24hrs), Av.5 °F (36.9 °C), Min:98.3 °F (36.8 °C), Max:98.7 °F (37.1 °C)       Intake/Output:   Last shift:         Last 3 shifts:    No intake or output data in the 24 hours ending 17 0835     Physical Exam:    General: in no apparent distress, non-toxic, alert, oriented times 3, normal vitals and cooperative   HEENT: Normal   Neck: No abnormally enlarged lymph nodes. Chest: normal   Lungs: bilateral wheezes   Heart: S1S2 present   Abdomen: abdomen is soft without significant tenderness, masses, organomegaly or guarding   Extremity: BLE 1+ edema   Neuro: alert   Skin: Skin color, texture, turgor normal. No rashes or lesions        DATA:  Labs:  Recent Labs      173  175  17   0423   WBC  9.9  12.1  8.3   HGB  13.2  12.5  12.8   HCT  42.3  39.4  40.6   PLT  243  223  212     Recent Labs      173  17   0435  17   0423  17   1044   NA  140  143  141  141   K  3.7  3.8  3.2*  3.2*   CL  105  108  106  105   CO2  28  28  26  29   GLU  125*  145*  142*  103*   BUN  18  16  11  9   CREA  1.12  1.03  1.00  1.11   CA  8.6  8.9  8.8  9.0   MG  2.1  2.3  2.1   --    ALB   --    --    --   3.3*   SGOT   --    --    --   14*   ALT   --    --    --   24   INR   --    --    --   1.1     No results for input(s): PH, PCO2, PO2, HCO3, FIO2 in the last 72 hours.     Imaging:    CXR Results  (Last 48 hours)    Tee Solitario, NP

## 2017-04-21 NOTE — ED NOTES
TRANSFER - OUT REPORT:    Verbal report given to ROSALINE Nieto(name) on Andreas Rangel  being transferred to [facility](unit) for routine progression of care       Report consisted of patients Situation, Background, Assessment and   Recommendations(SBAR). Information from the following report(s) SBAR, ED Summary, [facility] and Recent Results was reviewed with the receiving nurse. Opportunity for questions and clarification was provided.       Patient transported with:   Monitor  Registered Nurse  Tech

## 2017-04-21 NOTE — PROGRESS NOTES
Patient recently arrived to the 4th floor. Breathing treatment was given, and patient asked if she needed a BiPAP for the night, because one is ordered but not in room. Patient stated, \"I don't feel I need it tonight. \"  Instruction given to patient and nurse to call Respiratory if that status changes.      04/21/17 0011   Oxygen Therapy   O2 Sat (%) 98 %   Pulse via Oximetry 86 beats per minute   O2 Device Nasal cannula   O2 Flow Rate (L/min) 3.5 l/min   Pre-Treatment   Breathing Pattern Regular   Breath Sounds Bilateral Expiratory wheezing   Pulse 96   SpO2 98 %   Respirations 21   Post-Treatment   Breathing Pattern Regular   Breath Sounds Bilateral Diminished   Pulse 95   SpO2 99 %   Respirations 20   Treatment Tolerance Well   Procedures   $$ Subsequent Procedure Aerosol   Delivery Source Breath Actuated Nebulizer   Aerosolized Medications DuoNeb

## 2017-04-21 NOTE — PROGRESS NOTES
Patient refused antibiotic(Doxycycline) cursing and stating that she does not want it because the IV machine makes a lot of noise. She stated I just want to sleep. Will continue to monitor patient.

## 2017-04-21 NOTE — ROUTINE PROCESS
Bedside and Verbal shift change report given to Jayme Anaya  (oncoming nurse) by Leonie Collins RN  (offgoing nurse). Report included the following information SBAR, Kardex, ED Summary, Procedure Summary, Intake/Output, MAR and Recent Results.

## 2017-04-21 NOTE — PROGRESS NOTES
[facility]    Patient: Roda Kanner Age: 45 y.o. : 1965 MR#: [id number] SSN: [id number]  Date/Time: 2017 10:44 AM    Subjective/24-hour events:     Feeling a little better overall. Still SOB especially     Assessment:   Acute on chronic hypoxemic respiratory failure  COPD with acute exacerbation  Acute bronchitis  Severe, persistent asthma  MY   HTN  Morbid obesity    Plan:  Continue nebs, inaled/IV steroid as ordered. Doxycycline. PO anxiolytic and analgesic PRN. No plan for IV administration. Mobilize as tolerated. Not yet ready for discharge. Case discussed with:  [x]Patient  []Family  []Nursing  []Case Management  DVT Prophylaxis:  [x]Lovenox  []Hep SQ  []SCDs  []Coumadin   []On Heparin gtt    Objective:   VS:   Visit Vitals    /74 (BP 1 Location: Left arm, BP Patient Position: At rest)    Pulse 87    Temp 97.1 °F (36.2 °C)    Resp 22    Wt 113.4 kg (250 lb)    SpO2 96%    BMI 45.73 kg/m2      Tmax/24hrs: Temp (24hrs), Av °F (36.7 °C), Min:97.1 °F (36.2 °C), Max:98.7 °F (37.1 °C)  No intake or output data in the 24 hours ending 17 1044    General:  In NAD. Cardiovascular:  Regular, mildly tachy. Pulmonary:  Diffuse wheezes bilaterally. Less SOB with conversation today. GI:  Abdomen soft. Extremities:  Warm, no ischemia. Neuro:  Awake and alert.   Motor grossly nonfocal.    Labs:    Recent Results (from the past 24 hour(s))   GLUCOSE, POC    Collection Time: 17  1:28 PM   Result Value Ref Range    Glucose (POC) 116 (H) 70 - 110 mg/dL   GLUCOSE, POC    Collection Time: 17  5:08 PM   Result Value Ref Range    Glucose (POC) 119 (H) 70 - 110 mg/dL   CBC WITH AUTOMATED DIFF    Collection Time: 17  4:43 AM   Result Value Ref Range    WBC 9.9 4.6 - 13.2 K/uL    RBC 4.88 4.20 - 5.30 M/uL    HGB 13.2 12.0 - 16.0 g/dL    HCT 42.3 35.0 - 45.0 %    MCV 86.7 74.0 - 97.0 FL    MCH 27.0 24.0 - 34.0 PG    MCHC 31.2 31.0 - 37.0 g/dL    RDW 16.6 (H) 11.6 - 14.5 %    PLATELET 917 795 - 871 K/uL    MPV 11.4 9.2 - 11.8 FL    NEUTROPHILS 81 (H) 42 - 75 %    BAND NEUTROPHILS 1 0 - 5 %    LYMPHOCYTES 11 (L) 20 - 51 %    MONOCYTES 5 2 - 9 %    EOSINOPHILS 0 0 - 5 %    BASOPHILS 0 0 - 3 %    OTHER CELL 2 (H) 0      ABS. NEUTROPHILS 8.1 (H) 1.8 - 8.0 K/UL    ABS. LYMPHOCYTES 1.1 0.8 - 3.5 K/UL    ABS. MONOCYTES 0.5 0 - 1.0 K/UL    ABS. EOSINOPHILS 0.0 0.0 - 0.4 K/UL    ABS.  BASOPHILS 0.0 0.0 - 0.06 K/UL    DF MANUAL      PLATELET COMMENTS ADEQUATE PLATELETS      RBC COMMENTS ANISOCYTOSIS  1+        RBC COMMENTS STOMATOCYTES  1+       METABOLIC PANEL, BASIC    Collection Time: 04/21/17  4:43 AM   Result Value Ref Range    Sodium 140 136 - 145 mmol/L    Potassium 3.7 3.5 - 5.5 mmol/L    Chloride 105 100 - 108 mmol/L    CO2 28 21 - 32 mmol/L    Anion gap 7 3.0 - 18 mmol/L    Glucose 125 (H) 74 - 99 mg/dL    BUN 18 7.0 - 18 MG/DL    Creatinine 1.12 0.6 - 1.3 MG/DL    BUN/Creatinine ratio 16 12 - 20      GFR est AA >60 >60 ml/min/1.73m2    GFR est non-AA 51 (L) >60 ml/min/1.73m2    Calcium 8.6 8.5 - 10.1 MG/DL   MAGNESIUM    Collection Time: 04/21/17  4:43 AM   Result Value Ref Range    Magnesium 2.1 1.6 - 2.6 mg/dL   GLUCOSE, POC    Collection Time: 04/21/17  8:35 AM   Result Value Ref Range    Glucose (POC) 133 (H) 70 - 110 mg/dL       Signed By: Deepak Fermin MD     April 21, 2017 10:44 AM

## 2017-04-21 NOTE — PROGRESS NOTES
Patient refuses to use pep device, stating she is not ready to use it yet. Will continue to monitor.

## 2017-04-21 NOTE — ROUTINE PROCESS
Bedside and Verbal shift change report given to Tameka Webb RN (oncoming nurse) by [employer] RN (offgoing nurse). Report included the following information SBAR and Kardex.

## 2017-04-22 LAB
ANION GAP BLD CALC-SCNC: 8 MMOL/L (ref 3–18)
BASOPHILS # BLD AUTO: 0 K/UL (ref 0–0.1)
BASOPHILS # BLD: 0 % (ref 0–2)
BUN SERPL-MCNC: 17 MG/DL (ref 7–18)
BUN/CREAT SERPL: 20 (ref 12–20)
CALCIUM SERPL-MCNC: 7.4 MG/DL (ref 8.5–10.1)
CHLORIDE SERPL-SCNC: 107 MMOL/L (ref 100–108)
CO2 SERPL-SCNC: 27 MMOL/L (ref 21–32)
CREAT SERPL-MCNC: 0.85 MG/DL (ref 0.6–1.3)
DIFFERENTIAL METHOD BLD: ABNORMAL
EOSINOPHIL # BLD: 0 K/UL (ref 0–0.4)
EOSINOPHIL NFR BLD: 0 % (ref 0–5)
ERYTHROCYTE [DISTWIDTH] IN BLOOD BY AUTOMATED COUNT: 16.3 % (ref 11.6–14.5)
GLUCOSE BLD STRIP.AUTO-MCNC: 121 MG/DL (ref 70–110)
GLUCOSE BLD STRIP.AUTO-MCNC: 127 MG/DL (ref 70–110)
GLUCOSE BLD STRIP.AUTO-MCNC: 182 MG/DL (ref 70–110)
GLUCOSE BLD STRIP.AUTO-MCNC: 89 MG/DL (ref 70–110)
GLUCOSE SERPL-MCNC: 86 MG/DL (ref 74–99)
HCT VFR BLD AUTO: 38.9 % (ref 35–45)
HGB BLD-MCNC: 12.2 G/DL (ref 12–16)
LYMPHOCYTES # BLD AUTO: 22 % (ref 21–52)
LYMPHOCYTES # BLD: 2.2 K/UL (ref 0.9–3.6)
MAGNESIUM SERPL-MCNC: 1.8 MG/DL (ref 1.6–2.6)
MCH RBC QN AUTO: 26.8 PG (ref 24–34)
MCHC RBC AUTO-ENTMCNC: 31.4 G/DL (ref 31–37)
MCV RBC AUTO: 85.3 FL (ref 74–97)
MONOCYTES # BLD: 0.6 K/UL (ref 0.05–1.2)
MONOCYTES NFR BLD AUTO: 5 % (ref 3–10)
NEUTS SEG # BLD: 7.3 K/UL (ref 1.8–8)
NEUTS SEG NFR BLD AUTO: 73 % (ref 40–73)
PLATELET # BLD AUTO: 200 K/UL (ref 135–420)
PMV BLD AUTO: 11.2 FL (ref 9.2–11.8)
POTASSIUM SERPL-SCNC: 3.1 MMOL/L (ref 3.5–5.5)
RBC # BLD AUTO: 4.56 M/UL (ref 4.2–5.3)
SODIUM SERPL-SCNC: 142 MMOL/L (ref 136–145)
WBC # BLD AUTO: 10.1 K/UL (ref 4.6–13.2)

## 2017-04-22 PROCEDURE — 85025 COMPLETE CBC W/AUTO DIFF WBC: CPT | Performed by: NURSE PRACTITIONER

## 2017-04-22 PROCEDURE — 65270000029 HC RM PRIVATE

## 2017-04-22 PROCEDURE — 74011250637 HC RX REV CODE- 250/637: Performed by: FAMILY MEDICINE

## 2017-04-22 PROCEDURE — 74011636637 HC RX REV CODE- 636/637: Performed by: INTERNAL MEDICINE

## 2017-04-22 PROCEDURE — 74011250636 HC RX REV CODE- 250/636: Performed by: HOSPITALIST

## 2017-04-22 PROCEDURE — 74011000258 HC RX REV CODE- 258: Performed by: INTERNAL MEDICINE

## 2017-04-22 PROCEDURE — 74011250637 HC RX REV CODE- 250/637: Performed by: INTERNAL MEDICINE

## 2017-04-22 PROCEDURE — 82962 GLUCOSE BLOOD TEST: CPT

## 2017-04-22 PROCEDURE — 77010033678 HC OXYGEN DAILY

## 2017-04-22 PROCEDURE — 74011636637 HC RX REV CODE- 636/637: Performed by: NURSE PRACTITIONER

## 2017-04-22 PROCEDURE — 83735 ASSAY OF MAGNESIUM: CPT | Performed by: NURSE PRACTITIONER

## 2017-04-22 PROCEDURE — 74011000250 HC RX REV CODE- 250: Performed by: INTERNAL MEDICINE

## 2017-04-22 PROCEDURE — 74011000250 HC RX REV CODE- 250: Performed by: NURSE PRACTITIONER

## 2017-04-22 PROCEDURE — 94640 AIRWAY INHALATION TREATMENT: CPT

## 2017-04-22 PROCEDURE — 74011250637 HC RX REV CODE- 250/637: Performed by: NURSE PRACTITIONER

## 2017-04-22 PROCEDURE — 80048 BASIC METABOLIC PNL TOTAL CA: CPT | Performed by: NURSE PRACTITIONER

## 2017-04-22 PROCEDURE — 74011250636 HC RX REV CODE- 250/636: Performed by: NURSE PRACTITIONER

## 2017-04-22 PROCEDURE — 74011250636 HC RX REV CODE- 250/636: Performed by: INTERNAL MEDICINE

## 2017-04-22 RX ORDER — POTASSIUM CHLORIDE 20 MEQ/1
40 TABLET, EXTENDED RELEASE ORAL EVERY 4 HOURS
Status: COMPLETED | OUTPATIENT
Start: 2017-04-22 | End: 2017-04-22

## 2017-04-22 RX ORDER — BUDESONIDE 0.5 MG/2ML
500 INHALANT ORAL
Status: DISCONTINUED | OUTPATIENT
Start: 2017-04-22 | End: 2017-04-24 | Stop reason: HOSPADM

## 2017-04-22 RX ORDER — MAGNESIUM SULFATE HEPTAHYDRATE 40 MG/ML
2 INJECTION, SOLUTION INTRAVENOUS ONCE
Status: COMPLETED | OUTPATIENT
Start: 2017-04-22 | End: 2017-04-22

## 2017-04-22 RX ADMIN — ENOXAPARIN SODIUM 40 MG: 40 INJECTION SUBCUTANEOUS at 12:26

## 2017-04-22 RX ADMIN — HYDRALAZINE HYDROCHLORIDE 50 MG: 50 TABLET, FILM COATED ORAL at 16:05

## 2017-04-22 RX ADMIN — ALPRAZOLAM 0.25 MG: 0.5 TABLET ORAL at 21:27

## 2017-04-22 RX ADMIN — POTASSIUM CHLORIDE 40 MEQ: 1500 TABLET, EXTENDED RELEASE ORAL at 13:32

## 2017-04-22 RX ADMIN — BUDESONIDE 500 MCG: 0.5 INHALANT RESPIRATORY (INHALATION) at 09:19

## 2017-04-22 RX ADMIN — DOXYCYCLINE 100 MG: 100 INJECTION, POWDER, LYOPHILIZED, FOR SOLUTION INTRAVENOUS at 21:23

## 2017-04-22 RX ADMIN — IPRATROPIUM BROMIDE AND ALBUTEROL SULFATE 3 ML: 2.5; .5 SOLUTION RESPIRATORY (INHALATION) at 15:17

## 2017-04-22 RX ADMIN — MONTELUKAST SODIUM 10 MG: 10 TABLET, FILM COATED ORAL at 21:26

## 2017-04-22 RX ADMIN — FLUOXETINE 60 MG: 20 CAPSULE ORAL at 09:12

## 2017-04-22 RX ADMIN — IPRATROPIUM BROMIDE AND ALBUTEROL SULFATE 3 ML: 2.5; .5 SOLUTION RESPIRATORY (INHALATION) at 09:20

## 2017-04-22 RX ADMIN — ALPRAZOLAM 0.25 MG: 0.5 TABLET ORAL at 10:07

## 2017-04-22 RX ADMIN — MAGNESIUM SULFATE HEPTAHYDRATE 2 G: 40 INJECTION, SOLUTION INTRAVENOUS at 16:04

## 2017-04-22 RX ADMIN — OXYCODONE HYDROCHLORIDE AND ACETAMINOPHEN 2 TABLET: 5; 325 TABLET ORAL at 05:34

## 2017-04-22 RX ADMIN — ALPRAZOLAM 0.25 MG: 0.5 TABLET ORAL at 16:05

## 2017-04-22 RX ADMIN — ARFORMOTEROL TARTRATE 15 MCG: 15 SOLUTION RESPIRATORY (INHALATION) at 20:14

## 2017-04-22 RX ADMIN — OXYCODONE HYDROCHLORIDE AND ACETAMINOPHEN 2 TABLET: 5; 325 TABLET ORAL at 17:54

## 2017-04-22 RX ADMIN — FAMOTIDINE 20 MG: 20 TABLET ORAL at 09:11

## 2017-04-22 RX ADMIN — OXYCODONE HYDROCHLORIDE AND ACETAMINOPHEN 2 TABLET: 5; 325 TABLET ORAL at 21:27

## 2017-04-22 RX ADMIN — HYDRALAZINE HYDROCHLORIDE 50 MG: 50 TABLET, FILM COATED ORAL at 21:26

## 2017-04-22 RX ADMIN — OXYCODONE HYDROCHLORIDE AND ACETAMINOPHEN 2 TABLET: 5; 325 TABLET ORAL at 13:32

## 2017-04-22 RX ADMIN — Medication 10 ML: at 07:08

## 2017-04-22 RX ADMIN — ARFORMOTEROL TARTRATE 15 MCG: 15 SOLUTION RESPIRATORY (INHALATION) at 09:19

## 2017-04-22 RX ADMIN — BUDESONIDE 500 MCG: 0.5 INHALANT RESPIRATORY (INHALATION) at 20:13

## 2017-04-22 RX ADMIN — HYDRALAZINE HYDROCHLORIDE 50 MG: 50 TABLET, FILM COATED ORAL at 09:10

## 2017-04-22 RX ADMIN — BUMETANIDE 0.5 MG: 1 TABLET ORAL at 12:15

## 2017-04-22 RX ADMIN — Medication 10 ML: at 16:07

## 2017-04-22 RX ADMIN — PREDNISONE 40 MG: 20 TABLET ORAL at 09:12

## 2017-04-22 RX ADMIN — POTASSIUM CHLORIDE 40 MEQ: 1500 TABLET, EXTENDED RELEASE ORAL at 17:54

## 2017-04-22 RX ADMIN — DOXYCYCLINE 100 MG: 100 INJECTION, POWDER, LYOPHILIZED, FOR SOLUTION INTRAVENOUS at 09:16

## 2017-04-22 RX ADMIN — IPRATROPIUM BROMIDE AND ALBUTEROL SULFATE 3 ML: 2.5; .5 SOLUTION RESPIRATORY (INHALATION) at 20:13

## 2017-04-22 RX ADMIN — OXYCODONE HYDROCHLORIDE AND ACETAMINOPHEN 2 TABLET: 5; 325 TABLET ORAL at 09:10

## 2017-04-22 RX ADMIN — METHYLPREDNISOLONE SODIUM SUCCINATE 40 MG: 40 INJECTION, POWDER, FOR SOLUTION INTRAMUSCULAR; INTRAVENOUS at 12:14

## 2017-04-22 RX ADMIN — Medication 10 ML: at 22:00

## 2017-04-22 RX ADMIN — INSULIN LISPRO 2 UNITS: 100 INJECTION, SOLUTION INTRAVENOUS; SUBCUTANEOUS at 12:15

## 2017-04-22 RX ADMIN — METHYLPREDNISOLONE SODIUM SUCCINATE 40 MG: 40 INJECTION, POWDER, FOR SOLUTION INTRAMUSCULAR; INTRAVENOUS at 21:26

## 2017-04-22 RX ADMIN — ZOLPIDEM TARTRATE 5 MG: 5 TABLET ORAL at 21:26

## 2017-04-22 RX ADMIN — AMLODIPINE BESYLATE 10 MG: 10 TABLET ORAL at 09:09

## 2017-04-22 NOTE — PROGRESS NOTES
conducted an initial consultation and Spiritual Assessment for Herman Up, who is a 45 y.o.,female. Patients Primary Language is: [place]. According to the patients EMR Taoism Affiliation is: Cayden Bartlett.     The reason the Patient came to the hospital is:   Patient Active Problem List    Diagnosis Date Noted    Acute asthma exacerbation 04/21/2017    Essential hypertension 03/10/2017    Morbid obesity due to excess calories ([address] 03/10/2017    Chest pain on breathing 03/10/2017    COPD with acute exacerbation ([address] 03/02/2017    Acute respiratory failure with hypercapnia ([address] 02/26/2017    Asthma exacerbation 01/26/2017    Respiratory failure ([address] 01/11/2017    Acute encephalopathy 01/11/2017    Acute exacerbation of chronic obstructive pulmonary disease (COPD) ([address] 08/29/2016    COPD with exacerbation ([address] 07/11/2016    COPD exacerbation ([address] 03/12/2016    Acute on chronic respiratory failure with hypoxemia (HCC) 03/12/2016    Shortness of breath 03/05/2016    Asthma 03/05/2016    Abnormal CT of the chest 11/20/2015    Asthma exacerbation attacks 10/19/2015    Status asthmaticus with COPD (chronic obstructive pulmonary disease) ([address] 10/19/2015        The  provided the following Interventions:  Initiated a relationship of care and support. Explored issues of marilee, belief, spirituality and Oriental orthodox/ritual needs while hospitalized. Listened empathically. Provided information about Spiritual Care Services. Offered prayer and assurance of continued prayers on patient's behalf. Chart reviewed. The following outcomes where achieved:  Patient shared limited information about both their medical narrative and spiritual journey/beliefs.  confirmed Patient's Taoism Affiliation. Patient processed feeling about current hospitalization. Patient expressed gratitude for 's visit.     Assessment:  Patient does not have any Oriental orthodox/cultural needs that will affect patients preferences in health care. There are no spiritual or Sabianism issues which require intervention at this time. Plan:  Chaplains will continue to follow and will provide pastoral care on an as needed/requested basis.  recommends bedside caregivers page  on duty if patient shows signs of acute spiritual or emotional distress.       [address] [facility]   [phone]

## 2017-04-22 NOTE — PROGRESS NOTES
[facility]  Progress Note    Patient: Beatriz January Age: 45 y.o. : 1965 MR#: [id number] SSN: [id number]  Date/Time: 2017     Subjective/24-hour events:     Pt feeling SOB at rest, c/o wheezing. Assessment:   Acute on chronic hypoxemic respiratory failure  COPD with acute exacerbation  Acute bronchitis  Severe, persistent asthma  MY   HTN  Morbid obesity    Plan:  Continue nebs, start back on IV steroid. Cont Doxycycline. PO anxiolytic and analgesic PRN. Mobilize as tolerated. Case discussed with:  [x]Patient  []Family  []Nursing  []Case Management  DVT Prophylaxis:  [x]Lovenox  []Hep SQ  []SCDs  []Coumadin   []On Heparin gtt    Objective:   VS:   Visit Vitals    /83 (BP 1 Location: Left arm, BP Patient Position: Lying left side)    Pulse 88    Temp 96.8 °F (36 °C)    Resp 18    Wt 109.8 kg (242 lb)    SpO2 96%    BMI 44.26 kg/m2      Tmax/24hrs: Temp (24hrs), Av.3 °F (36.8 °C), Min:96.8 °F (36 °C), Max:99.3 °F (37.4 °C)      Intake/Output Summary (Last 24 hours) at 17 1146  Last data filed at 17 0916   Gross per 24 hour   Intake              580 ml   Output                0 ml   Net              580 ml       General:  In NAD. Cardiovascular:  Regular, mildly tachy. Pulmonary:  Diffuse wheezes bilaterally. Air entry tight. GI:  Abdomen soft. Extremities:  Warm, no ischemia. Neuro:  Awake and alert.   Motor grossly nonfocal.    Labs:    Recent Results (from the past 24 hour(s))   GLUCOSE, POC    Collection Time: 17  1:14 PM   Result Value Ref Range    Glucose (POC) 110 70 - 110 mg/dL   GLUCOSE, POC    Collection Time: 17  6:26 PM   Result Value Ref Range    Glucose (POC) 123 (H) 70 - 110 mg/dL   GLUCOSE, POC    Collection Time: 17  9:59 PM   Result Value Ref Range    Glucose (POC) 129 (H) 70 - 110 mg/dL   CBC WITH AUTOMATED DIFF    Collection Time: 17  5:25 AM   Result Value Ref Range    WBC 10.1 4.6 - 13.2 K/uL    RBC 4.56 4.20 - 5.30 M/uL    HGB 12.2 12.0 - 16.0 g/dL    HCT 38.9 35.0 - 45.0 %    MCV 85.3 74.0 - 97.0 FL    MCH 26.8 24.0 - 34.0 PG    MCHC 31.4 31.0 - 37.0 g/dL    RDW 16.3 (H) 11.6 - 14.5 %    PLATELET 895 382 - 451 K/uL    MPV 11.2 9.2 - 11.8 FL    NEUTROPHILS 73 40 - 73 %    LYMPHOCYTES 22 21 - 52 %    MONOCYTES 5 3 - 10 %    EOSINOPHILS 0 0 - 5 %    BASOPHILS 0 0 - 2 %    ABS. NEUTROPHILS 7.3 1.8 - 8.0 K/UL    ABS. LYMPHOCYTES 2.2 0.9 - 3.6 K/UL    ABS. MONOCYTES 0.6 0.05 - 1.2 K/UL    ABS. EOSINOPHILS 0.0 0.0 - 0.4 K/UL    ABS.  BASOPHILS 0.0 0.0 - 0.1 K/UL    DF AUTOMATED     METABOLIC PANEL, BASIC    Collection Time: 04/22/17  5:25 AM   Result Value Ref Range    Sodium 142 136 - 145 mmol/L    Potassium 3.1 (L) 3.5 - 5.5 mmol/L    Chloride 107 100 - 108 mmol/L    CO2 27 21 - 32 mmol/L    Anion gap 8 3.0 - 18 mmol/L    Glucose 86 74 - 99 mg/dL    BUN 17 7.0 - 18 MG/DL    Creatinine 0.85 0.6 - 1.3 MG/DL    BUN/Creatinine ratio 20 12 - 20      GFR est AA >60 >60 ml/min/1.73m2    GFR est non-AA >60 >60 ml/min/1.73m2    Calcium 7.4 (L) 8.5 - 10.1 MG/DL   MAGNESIUM    Collection Time: 04/22/17  5:25 AM   Result Value Ref Range    Magnesium 1.8 1.6 - 2.6 mg/dL   GLUCOSE, POC    Collection Time: 04/22/17  7:57 AM   Result Value Ref Range    Glucose (POC) 89 70 - 110 mg/dL   GLUCOSE, POC    Collection Time: 04/22/17 11:31 AM   Result Value Ref Range    Glucose (POC) 182 (H) 70 - 110 mg/dL       Signed By: Ann Marie Melendez MD     April 22, 2017

## 2017-04-22 NOTE — ROUTINE PROCESS
Bedside and Verbal shift change report given to Tami Cárdenas RN (oncoming nurse) by Magdiel Schaefer RN (offgoing nurse). Report included the following information SBAR, Kardex, MAR and Recent Results.     SITUATION:    Code Status: Full Code   Reason for Admission: COPD exacerbation   Acute asthma exacerbation    [facility] day: 1   Problem List:       Hospital Problems  Date Reviewed: 3/10/2017          Codes Class Noted POA    Acute asthma exacerbation ICD-10-CM: J45.901  ICD-9-CM: 493.92  4/21/2017 Unknown              BACKGROUND:    Past Medical History:   Past Medical History:   Diagnosis Date    Asthma     Chronic obstructive pulmonary disease ([facility] [address]     Endocrine disease     thyroid issues    Gastrointestinal disorder     \"blockage in my stomach\"    Hypertension          Patient taking anticoagulants no     ASSESSMENT:    Changes in Assessment Throughout Shift: none     Patient has Central Line: no Reasons if yes: n/a   Patient has Walker Cath: no Reasons if yes: n/a      Last Vitals:     Vitals:    04/21/17 1803 04/21/17 2029 04/21/17 2037 04/22/17 0000   BP: 107/79  149/74 142/74   Pulse: (!) 101  (!) 104 89   Resp: 20 18 17   Temp: 98.3 °F (36.8 °C)  99.3 °F (37.4 °C) 98.9 °F (37.2 °C)   SpO2: 93% 94% 96% 96%   Weight:            IV and DRAINS (will only show if present)   Peripheral IV 04/19/17 Right External jugular-Site Assessment: Clean, dry, & intact  [REMOVED] Peripheral IV 04/19/17 Right Antecubital-Site Assessment: Clean, dry, & intact  PICC Double Lumen 81/56/51 Right;Basilic-Site Assessment: Clean, dry, & intact  [REMOVED] Peripheral IV 04/18/17 Right Antecubital-Site Assessment: Clean, dry, & intact     WOUND (if present)   Wound Type:  none   Dressing present Dressing Present : No   Wound Concerns/Notes:  none     PAIN    Pain Assessment    Pain Intensity 1: 0 (04/21/17 2230)              Patient Stated Pain Goal: 0  o Interventions for Pain:  none  o Intervention effective: no  o Time of last intervention: n/a   o Reassessment Completed: no      Last 3 Weights:  Last 3 Recorded Weights in this Encounter    04/18/17 1036   Weight: 113.4 kg (250 lb)     Weight change:      INTAKE/OUPUT    Current Shift:      Last three shifts: 04/20 0701 - 04/21 1900  In: 720 [P.O.:720]  Out: -      LAB RESULTS     Recent Labs      04/21/17   0443  04/20/17   0435  04/19/17   0423   WBC  9.9  12.1  8.3   HGB  13.2  12.5  12.8   HCT  42.3  39.4  40.6   PLT  243  223  212        Recent Labs      04/21/17   0443  04/20/17   0435  04/19/17   0423   NA  140  143  141   K  3.7  3.8  3.2*   GLU  125*  145*  142*   BUN  18  16  11   CREA  1.12  1.03  1.00   CA  8.6  8.9  8.8   MG  2.1  2.3  2.1       RECOMMENDATIONS AND DISCHARGE PLANNING     1. Pending tests/procedures/ Plan of Care or Other Needs: TBD     2. Discharge plan for patient and Needs/Barriers: TBD    3. Estimated Discharge Date: 4/25/17 Posted on Whiteboard in [facility]: yes      4. The patient's care plan was reviewed with the oncoming nurse. \"HEALS\" SAFETY CHECK      Fall Risk    Total Score: 1    Safety Measures: Safety Measures: Bed/Chair alarm on, Bed/Chair-Wheels locked, Bed in low position, Call light within reach, Side rails X 3    A safety check occurred in the patient's room between off going nurse and oncoming nurse listed above.     The safety check included the below items  Area Items   H  High Alert Medications - Verify all high alert medication drips (heparin, PCA, etc.)   E  Equipment - Suction is set up for ALL patients (with yanker)  - Red plugs utilized for all equipment (IV pumps, etc.)  - WOWs wiped down at end of shift.  - Room stocked with oxygen, suction, and other unit-specific supplies   A  Alarms - Bed alarm is set for fall risk patients  - Ensure chair alarm is in place and activated if patient is up in a chair   L  Lines - Check IV for any infiltration  - Walker bag is empty if patient has a Walker   - Tubing and IV bags are labeled   S  Safety   - Room is clean, patient is clean, and equipment is clean. - Hallways are clear from equipment besides carts. - Fall bracelet on for fall risk patients  - Ensure room is clear and free of clutter  - Suction is set up for ALL patients (with shaanker)  - Hallways are clear from equipment besides carts.    - Isolation precautions followed, supplies available outside room, sign posted     Dilma Ross RN

## 2017-04-22 NOTE — PROGRESS NOTES
[facility]  Pulmonary, Critical Care, and Sleep Medicine    Name: Myron Orta MRN: [id number]   : 1965 Hospital: [address] [place] [address]   Date: 2017        IMPRESSION:   · Acute/chronic hypoxemic respiratory failure with acute hypercarbia in the setting of COPD/asthma with exacerbation   · COPD with exacerbation/appears to be chronic bronchitis   · Acute bronchitis   · Severe asthma  · Chronic hypoxemia on home o2- 2L  · MY on CPAP- noncompliant   · HTN  · Obesity- BMI 45  · Hyperglycemia in the setting of steroid use     Hx of vocal cord dysfunction   Hx of intubation and frequent admissions for respiratory failure  Hx of cocaine and heroin abuse  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 HOB > 30 degrees. Will continue duonebs q4;  brovana;continue pulmicort; . Encourage incentive spirometry and PEP therapy  · Prednisone change back to IV steroids by primary team. Wheezing is expiratory and given patients hx this is possibly r/t vocal cord dysfunction     · Diuretics  by the primary team.  · Lower ext doppler negative for DVT  · Rapid flu negative. Sputum cx with normal jimena. · Abx- doxycycline  · Replace electrolytes   · Patient will need f/u with her pulmonologist on discharge      Subjective/Interval History:     Patient is a 45 y.o. female pmhx of severe asthma, vocal cord dysfunction, COPD, HTN, polysubstance abuse, and chronic hypoxemia admitted for acute exacerbation of COPD/asthma. afebrile  Patient is tolerating weaning of oxygen.  She states her breathing has not improved    On 3.0 L NC    C/o Wheezing, SOB, cough, sputum, intermittent dyspnea at rest, and musculoskeletal CP    ROS:Constitutional: negative  Eyes: negative  Ears, nose, mouth, throat, and face: negative  Respiratory: positive for cough, sputum, asthma, wheezing or dyspnea on exertion  Cardiovascular: positive for chest pressure/discomfort, orthopnea  Gastrointestinal: negative  Genitourinary:negative  Integument/breast: negative  Hematologic/lymphatic: negative  Musculoskeletal:positive for B/l lower ext leg pain  Neurological: negative  Behavioral/Psych: negative  Endocrine: negative    Objective:   Vital Signs:    Visit Vitals    /71 (BP 1 Location: Left arm, BP Patient Position: Lying left side)    Pulse 95    Temp 96.8 °F (36 °C)    Resp 17    Wt 109.8 kg (242 lb)    SpO2 92%    BMI 44.26 kg/m2       O2 Device: Nasal cannula   O2 Flow Rate (L/min): 3 l/min   Temp (24hrs), Av.1 °F (36.7 °C), Min:96.8 °F (36 °C), Max:99.3 °F (37.4 °C)       Intake/Output:   Last shift:      701 - 1900  In: 340 [P.O.:240; I.V.:100]  Out: -   Last 3 shifts: 1901 -  07  In: 720 [P.O.:720]  Out: -     Intake/Output Summary (Last 24 hours) at 17 1258  Last data filed at 17 0916   Gross per 24 hour   Intake              820 ml   Output                0 ml   Net              820 ml        Physical Exam:    General: in no apparent distress, non-toxic, alert, oriented times 3, normal vitals and cooperative   HEENT: Normal   Neck: No abnormally enlarged lymph nodes.    Chest: normal   Lungs: bilateral expiratory wheezes   Heart: S1S2 present   Abdomen: abdomen is soft without significant tenderness, masses, organomegaly or guarding   Extremity: BLE 1+ edema   Neuro: alert   Skin: Skin color, texture, turgor normal. No rashes or lesions        DATA:  Labs:  Recent Labs      17   05173  17   0435   WBC  10.1  9.9  12.1   HGB  12.2  13.2  12.5   HCT  38.9  42.3  39.4   PLT  200  243  223     Recent Labs      17   0525  17   0443  17   0435   NA  142  140  143   K  3.1*  3.7  3.8   CL  107  105  108   CO2  27  28  28   GLU  86  125*  145*   BUN  17  18  16   CREA  0.85  1.12  1.03   CA  7.4*  8.6  8.9   MG  1.8  2.1  2.3     No results for input(s): PH, PCO2, PO2, HCO3, FIO2 in the last 72 hours.     Imaging:    CXR Results  (Last 48 hours)    None                Les Gnozalez NP

## 2017-04-22 NOTE — PROGRESS NOTES
Called Dr. Litzy De Oliveira concerning pt request for stronger sleeping pil and stronger xanax. Dr. Litzy De Oliveira said he could not give her anything stronger. Pt made aware.

## 2017-04-23 LAB
ANION GAP BLD CALC-SCNC: 6 MMOL/L (ref 3–18)
BASOPHILS # BLD AUTO: 0 K/UL (ref 0–0.1)
BASOPHILS # BLD: 0 % (ref 0–2)
BUN SERPL-MCNC: 18 MG/DL (ref 7–18)
BUN/CREAT SERPL: 24 (ref 12–20)
CALCIUM SERPL-MCNC: 8 MG/DL (ref 8.5–10.1)
CHLORIDE SERPL-SCNC: 107 MMOL/L (ref 100–108)
CO2 SERPL-SCNC: 28 MMOL/L (ref 21–32)
CREAT SERPL-MCNC: 0.74 MG/DL (ref 0.6–1.3)
DIFFERENTIAL METHOD BLD: ABNORMAL
EOSINOPHIL # BLD: 0 K/UL (ref 0–0.4)
EOSINOPHIL NFR BLD: 0 % (ref 0–5)
ERYTHROCYTE [DISTWIDTH] IN BLOOD BY AUTOMATED COUNT: 15.7 % (ref 11.6–14.5)
GLUCOSE BLD STRIP.AUTO-MCNC: 102 MG/DL (ref 70–110)
GLUCOSE BLD STRIP.AUTO-MCNC: 103 MG/DL (ref 70–110)
GLUCOSE BLD STRIP.AUTO-MCNC: 134 MG/DL (ref 70–110)
GLUCOSE SERPL-MCNC: 106 MG/DL (ref 74–99)
HCT VFR BLD AUTO: 39.8 % (ref 35–45)
HGB BLD-MCNC: 12.8 G/DL (ref 12–16)
LYMPHOCYTES # BLD AUTO: 10 % (ref 21–52)
LYMPHOCYTES # BLD: 1.2 K/UL (ref 0.9–3.6)
MAGNESIUM SERPL-MCNC: 2.3 MG/DL (ref 1.6–2.6)
MCH RBC QN AUTO: 26.8 PG (ref 24–34)
MCHC RBC AUTO-ENTMCNC: 32.2 G/DL (ref 31–37)
MCV RBC AUTO: 83.4 FL (ref 74–97)
MONOCYTES # BLD: 0.4 K/UL (ref 0.05–1.2)
MONOCYTES NFR BLD AUTO: 4 % (ref 3–10)
NEUTS SEG # BLD: 10.4 K/UL (ref 1.8–8)
NEUTS SEG NFR BLD AUTO: 86 % (ref 40–73)
PLATELET # BLD AUTO: 236 K/UL (ref 135–420)
PMV BLD AUTO: 11.3 FL (ref 9.2–11.8)
POTASSIUM SERPL-SCNC: 4 MMOL/L (ref 3.5–5.5)
RBC # BLD AUTO: 4.77 M/UL (ref 4.2–5.3)
SODIUM SERPL-SCNC: 141 MMOL/L (ref 136–145)
WBC # BLD AUTO: 12.1 K/UL (ref 4.6–13.2)

## 2017-04-23 PROCEDURE — 85025 COMPLETE CBC W/AUTO DIFF WBC: CPT | Performed by: NURSE PRACTITIONER

## 2017-04-23 PROCEDURE — 94640 AIRWAY INHALATION TREATMENT: CPT

## 2017-04-23 PROCEDURE — 83735 ASSAY OF MAGNESIUM: CPT | Performed by: NURSE PRACTITIONER

## 2017-04-23 PROCEDURE — 77010033678 HC OXYGEN DAILY

## 2017-04-23 PROCEDURE — 74011250637 HC RX REV CODE- 250/637: Performed by: INTERNAL MEDICINE

## 2017-04-23 PROCEDURE — 74011250637 HC RX REV CODE- 250/637: Performed by: FAMILY MEDICINE

## 2017-04-23 PROCEDURE — 74011250636 HC RX REV CODE- 250/636: Performed by: HOSPITALIST

## 2017-04-23 PROCEDURE — 82962 GLUCOSE BLOOD TEST: CPT

## 2017-04-23 PROCEDURE — 74011000250 HC RX REV CODE- 250: Performed by: INTERNAL MEDICINE

## 2017-04-23 PROCEDURE — 74011250636 HC RX REV CODE- 250/636: Performed by: NURSE PRACTITIONER

## 2017-04-23 PROCEDURE — 74011250636 HC RX REV CODE- 250/636: Performed by: INTERNAL MEDICINE

## 2017-04-23 PROCEDURE — 74011000250 HC RX REV CODE- 250: Performed by: NURSE PRACTITIONER

## 2017-04-23 PROCEDURE — 65270000029 HC RM PRIVATE

## 2017-04-23 PROCEDURE — 80048 BASIC METABOLIC PNL TOTAL CA: CPT | Performed by: NURSE PRACTITIONER

## 2017-04-23 PROCEDURE — 74011000258 HC RX REV CODE- 258: Performed by: INTERNAL MEDICINE

## 2017-04-23 RX ORDER — IPRATROPIUM BROMIDE AND ALBUTEROL SULFATE 2.5; .5 MG/3ML; MG/3ML
3 SOLUTION RESPIRATORY (INHALATION)
Status: DISCONTINUED | OUTPATIENT
Start: 2017-04-23 | End: 2017-04-24

## 2017-04-23 RX ORDER — ONDANSETRON 2 MG/ML
4 INJECTION INTRAMUSCULAR; INTRAVENOUS
Status: DISCONTINUED | OUTPATIENT
Start: 2017-04-23 | End: 2017-04-24 | Stop reason: HOSPADM

## 2017-04-23 RX ADMIN — ENOXAPARIN SODIUM 40 MG: 40 INJECTION SUBCUTANEOUS at 12:49

## 2017-04-23 RX ADMIN — Medication 10 ML: at 20:56

## 2017-04-23 RX ADMIN — BUDESONIDE 500 MCG: 0.5 INHALANT RESPIRATORY (INHALATION) at 19:34

## 2017-04-23 RX ADMIN — ALPRAZOLAM 0.25 MG: 0.5 TABLET ORAL at 23:02

## 2017-04-23 RX ADMIN — HYDRALAZINE HYDROCHLORIDE 50 MG: 50 TABLET, FILM COATED ORAL at 09:34

## 2017-04-23 RX ADMIN — METHYLPREDNISOLONE SODIUM SUCCINATE 40 MG: 40 INJECTION, POWDER, FOR SOLUTION INTRAMUSCULAR; INTRAVENOUS at 07:35

## 2017-04-23 RX ADMIN — ARFORMOTEROL TARTRATE 15 MCG: 15 SOLUTION RESPIRATORY (INHALATION) at 08:34

## 2017-04-23 RX ADMIN — Medication 10 ML: at 14:40

## 2017-04-23 RX ADMIN — DOXYCYCLINE 100 MG: 100 INJECTION, POWDER, LYOPHILIZED, FOR SOLUTION INTRAVENOUS at 23:00

## 2017-04-23 RX ADMIN — OXYCODONE HYDROCHLORIDE AND ACETAMINOPHEN 2 TABLET: 5; 325 TABLET ORAL at 17:10

## 2017-04-23 RX ADMIN — ARFORMOTEROL TARTRATE 15 MCG: 15 SOLUTION RESPIRATORY (INHALATION) at 19:34

## 2017-04-23 RX ADMIN — IPRATROPIUM BROMIDE AND ALBUTEROL SULFATE 3 ML: 2.5; .5 SOLUTION RESPIRATORY (INHALATION) at 08:34

## 2017-04-23 RX ADMIN — FLUOXETINE 60 MG: 20 CAPSULE ORAL at 09:33

## 2017-04-23 RX ADMIN — BUDESONIDE 500 MCG: 0.5 INHALANT RESPIRATORY (INHALATION) at 08:34

## 2017-04-23 RX ADMIN — HYDRALAZINE HYDROCHLORIDE 50 MG: 50 TABLET, FILM COATED ORAL at 23:01

## 2017-04-23 RX ADMIN — ALPRAZOLAM 0.25 MG: 0.5 TABLET ORAL at 07:36

## 2017-04-23 RX ADMIN — MONTELUKAST SODIUM 10 MG: 10 TABLET, FILM COATED ORAL at 23:00

## 2017-04-23 RX ADMIN — IPRATROPIUM BROMIDE AND ALBUTEROL SULFATE 3 ML: .5; 3 SOLUTION RESPIRATORY (INHALATION) at 19:35

## 2017-04-23 RX ADMIN — DOXYCYCLINE 100 MG: 100 INJECTION, POWDER, LYOPHILIZED, FOR SOLUTION INTRAVENOUS at 09:35

## 2017-04-23 RX ADMIN — OXYCODONE HYDROCHLORIDE AND ACETAMINOPHEN 2 TABLET: 5; 325 TABLET ORAL at 20:55

## 2017-04-23 RX ADMIN — ONDANSETRON HYDROCHLORIDE 4 MG: 2 SOLUTION INTRAMUSCULAR; INTRAVENOUS at 20:55

## 2017-04-23 RX ADMIN — HYDRALAZINE HYDROCHLORIDE 50 MG: 50 TABLET, FILM COATED ORAL at 17:05

## 2017-04-23 RX ADMIN — Medication 10 ML: at 06:00

## 2017-04-23 RX ADMIN — FAMOTIDINE 20 MG: 20 TABLET ORAL at 09:34

## 2017-04-23 RX ADMIN — OXYCODONE HYDROCHLORIDE AND ACETAMINOPHEN 2 TABLET: 5; 325 TABLET ORAL at 12:48

## 2017-04-23 RX ADMIN — OXYCODONE HYDROCHLORIDE AND ACETAMINOPHEN 2 TABLET: 5; 325 TABLET ORAL at 02:47

## 2017-04-23 RX ADMIN — ALPRAZOLAM 0.25 MG: 0.5 TABLET ORAL at 17:05

## 2017-04-23 RX ADMIN — IPRATROPIUM BROMIDE AND ALBUTEROL SULFATE 3 ML: 2.5; .5 SOLUTION RESPIRATORY (INHALATION) at 14:11

## 2017-04-23 RX ADMIN — METHYLPREDNISOLONE SODIUM SUCCINATE 40 MG: 40 INJECTION, POWDER, FOR SOLUTION INTRAMUSCULAR; INTRAVENOUS at 20:55

## 2017-04-23 RX ADMIN — ONDANSETRON HYDROCHLORIDE 4 MG: 2 SOLUTION INTRAMUSCULAR; INTRAVENOUS at 12:48

## 2017-04-23 RX ADMIN — ZOLPIDEM TARTRATE 5 MG: 5 TABLET ORAL at 23:02

## 2017-04-23 RX ADMIN — AMLODIPINE BESYLATE 10 MG: 10 TABLET ORAL at 09:33

## 2017-04-23 RX ADMIN — OXYCODONE HYDROCHLORIDE AND ACETAMINOPHEN 2 TABLET: 5; 325 TABLET ORAL at 07:36

## 2017-04-23 NOTE — PROGRESS NOTES
[facility]  Pulmonary, Critical Care, and Sleep Medicine    Name: Singh Dsailva MRN: [id number]   : 1965 Hospital: [facility]   Date: 2017        IMPRESSION:   · Acute/chronic hypoxemic respiratory failure with acute hypercarbia in the setting of COPD/asthma with exacerbation   · COPD with exacerbation/appears to be chronic bronchitis   · Acute bronchitis   · Severe asthma  · Chronic hypoxemia on home o2- 2L  · MY on CPAP- noncompliant   · HTN  · Obesity- BMI 45  · Hyperglycemia in the setting of steroid use     Hx of vocal cord dysfunction   Hx of intubation and frequent admissions for respiratory failure  Hx of cocaine and heroin abuse  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 HOB > 30 degrees. Will continue duonebs q4;  brovana;continue pulmicort; . Encourage incentive spirometry and PEP therapy  · Weaning solumedrol to 40 mg BID    · Discussed with nursing weaning supplemental oxygen. I am not sure why it was increased to 4 liters. Patient did not having any desaturation per the nursing staff. Keep 90-94%  · Diuretics  by the primary team.  · Lower ext doppler negative for DVT  · Rapid flu negative. Sputum cx with normal jimean. · Abx- doxycycline- total of 5 days    · Patient will need f/u with her pulmonologist on discharge      Subjective/Interval History:     Patient is a 45 y.o. female pmhx of severe asthma, vocal cord dysfunction, COPD, HTN, polysubstance abuse, and chronic hypoxemia admitted for acute exacerbation of COPD/asthma. afebrile  On 4.0 L NC  No acute events overnight     C/o Wheezing, SOB, cough, sputum,  dyspnea on exertion and musculoskeletal CP.  Although patient states overall her symptoms have improved     ROS:Constitutional: negative  Eyes: negative  Ears, nose, mouth, throat, and face: negative  Respiratory: positive for cough, sputum, asthma, wheezing or dyspnea on exertion  Cardiovascular: positive for chest pressure/discomfort, orthopnea  Gastrointestinal: negative  Genitourinary:negative  Integument/breast: negative  Hematologic/lymphatic: negative  Musculoskeletal:positive for B/l lower ext leg pain  Neurological: negative  Behavioral/Psych: negative  Endocrine: negative    Objective:   Vital Signs:    Visit Vitals    /87    Pulse 89    Temp 98.5 °F (36.9 °C)    Resp 18    Wt 109.3 kg (241 lb)    SpO2 97%    BMI 44.08 kg/m2       O2 Device: Nasal cannula   O2 Flow Rate (L/min): 4 l/min   Temp (24hrs), Av.9 °F (36.6 °C), Min:96.8 °F (36 °C), Max:98.6 °F (37 °C)       Intake/Output:   Last shift:      701 - 1900  In: 580 [P.O.:480; I.V.:100]  Out: -   Last 3 shifts: 1901 -  07  In: 490 [P.O.:340; I.V.:150]  Out: 400 [Urine:400]    Intake/Output Summary (Last 24 hours) at 17 1036  Last data filed at 17 0935   Gross per 24 hour   Intake              730 ml   Output              400 ml   Net              330 ml        Physical Exam:    General: in no apparent distress, non-toxic, alert, oriented times 3, normal vitals and cooperative   HEENT: Normal   Neck: No abnormally enlarged lymph nodes. Chest: normal   Lungs: MOD AE, expiratory wheezing is overall improved.  R>L   Heart: S1S2 present   Abdomen: abdomen is soft without significant tenderness, masses, organomegaly or guarding   Extremity: BLE 1+ edema   Neuro: alert   Skin: Skin color, texture, turgor normal. No rashes or lesions        DATA:  Labs:  Recent Labs      17   0530  17   0525  17   0443   WBC  12.1  10.1  9.9   HGB  12.8  12.2  13.2   HCT  39.8  38.9  42.3   PLT  236  200  243     Recent Labs      17   0530  17   0525  17   0443   NA  141  142  140   K  4.0  3.1*  3.7   CL  107  107  105   CO2  28  27  28   GLU  106*  86  125*   BUN  18  17  18   CREA  0.74  0.85  1.12   CA  8.0*  7.4*  8.6   MG  2.3  1.8  2.1     No results for input(s): PH, PCO2, PO2, HCO3, FIO2 in the last 72 hours.     Imaging:    CXR Results  (Last 48 hours)    None                Les Gonzalez NP

## 2017-04-23 NOTE — PROGRESS NOTES
[facility] Hospitalist Group  Progress Note    Patient: Jonathan Boswell Age: 45 y.o. : 1965 MR#: [id number] SSN: [id number]  Date/Time: 2017     Subjective/24-hour events:     Pt feeling better, SOB better. Assessment:   Acute on chronic hypoxemic respiratory failure  COPD with acute exacerbation  Acute bronchitis  Severe, persistent asthma  MY   HTN  Morbid obesity    Plan:  Continue nebs, taper IV steroid. Cont Doxycycline. PO anxiolytic and analgesic PRN. Mobilize as tolerated. Case discussed with:  [x]Patient  []Family  []Nursing  []Case Management  DVT Prophylaxis:  [x]Lovenox  []Hep SQ  []SCDs  []Coumadin   []On Heparin gtt    Objective:   VS:   Visit Vitals    /87    Pulse 89    Temp 98.5 °F (36.9 °C)    Resp 18    Wt 109.3 kg (241 lb)    SpO2 97%    BMI 44.08 kg/m2      Tmax/24hrs: Temp (24hrs), Av.9 °F (36.6 °C), Min:96.8 °F (36 °C), Max:98.6 °F (37 °C)      Intake/Output Summary (Last 24 hours) at 17 1130  Last data filed at 17 0935   Gross per 24 hour   Intake              730 ml   Output              400 ml   Net              330 ml       General:  In NAD. Cardiovascular:  Regular, mildly tachy. Pulmonary:  Mild exp wheezes bilaterally. Air entry better. GI:  Abdomen soft. Extremities:  Warm, no ischemia. Neuro:  Awake and alert.   Motor grossly nonfocal.    Labs:    Recent Results (from the past 24 hour(s))   GLUCOSE, POC    Collection Time: 17 11:31 AM   Result Value Ref Range    Glucose (POC) 182 (H) 70 - 110 mg/dL   GLUCOSE, POC    Collection Time: 17  4:18 PM   Result Value Ref Range    Glucose (POC) 127 (H) 70 - 110 mg/dL   GLUCOSE, POC    Collection Time: 17 10:29 PM   Result Value Ref Range    Glucose (POC) 121 (H) 70 - 110 mg/dL   CBC WITH AUTOMATED DIFF    Collection Time: 17  5:30 AM   Result Value Ref Range    WBC 12.1 4.6 - 13.2 K/uL    RBC 4.77 4.20 - 5.30 M/uL    HGB 12.8 12.0 - 16.0 g/dL    HCT 39.8 35.0 - 45.0 %    MCV 83.4 74.0 - 97.0 FL    MCH 26.8 24.0 - 34.0 PG    MCHC 32.2 31.0 - 37.0 g/dL    RDW 15.7 (H) 11.6 - 14.5 %    PLATELET 912 776 - 339 K/uL    MPV 11.3 9.2 - 11.8 FL    NEUTROPHILS 86 (H) 40 - 73 %    LYMPHOCYTES 10 (L) 21 - 52 %    MONOCYTES 4 3 - 10 %    EOSINOPHILS 0 0 - 5 %    BASOPHILS 0 0 - 2 %    ABS. NEUTROPHILS 10.4 (H) 1.8 - 8.0 K/UL    ABS. LYMPHOCYTES 1.2 0.9 - 3.6 K/UL    ABS. MONOCYTES 0.4 0.05 - 1.2 K/UL    ABS. EOSINOPHILS 0.0 0.0 - 0.4 K/UL    ABS.  BASOPHILS 0.0 0.0 - 0.1 K/UL    DF AUTOMATED     METABOLIC PANEL, BASIC    Collection Time: 04/23/17  5:30 AM   Result Value Ref Range    Sodium 141 136 - 145 mmol/L    Potassium 4.0 3.5 - 5.5 mmol/L    Chloride 107 100 - 108 mmol/L    CO2 28 21 - 32 mmol/L    Anion gap 6 3.0 - 18 mmol/L    Glucose 106 (H) 74 - 99 mg/dL    BUN 18 7.0 - 18 MG/DL    Creatinine 0.74 0.6 - 1.3 MG/DL    BUN/Creatinine ratio 24 (H) 12 - 20      GFR est AA >60 >60 ml/min/1.73m2    GFR est non-AA >60 >60 ml/min/1.73m2    Calcium 8.0 (L) 8.5 - 10.1 MG/DL   MAGNESIUM    Collection Time: 04/23/17  5:30 AM   Result Value Ref Range    Magnesium 2.3 1.6 - 2.6 mg/dL   GLUCOSE, POC    Collection Time: 04/23/17  8:19 AM   Result Value Ref Range    Glucose (POC) 103 70 - 110 mg/dL       Signed By: Dev Eden MD     April 23, 2017

## 2017-04-23 NOTE — ROUTINE PROCESS
Bedside and Verbal shift change report given to Cayden Martinez (oncoming nurse) by Ilsa Tomas RN (offgoing nurse). Report included the following information SBAR, Kardex, MAR and Recent Results.     SITUATION:    Code Status: Full Code   Reason for Admission: COPD exacerbation   Acute asthma exacerbation    [facility] day: 1   Problem List:       Hospital Problems  Date Reviewed: 3/10/2017          Codes Class Noted POA    Acute asthma exacerbation ICD-10-CM: J45.901  ICD-9-CM: 493.92  4/21/2017 Unknown              BACKGROUND:    Past Medical History:   Past Medical History:   Diagnosis Date    Asthma     Chronic obstructive pulmonary disease ([facility] [address]     Endocrine disease     thyroid issues    Gastrointestinal disorder     \"blockage in my stomach\"    Hypertension          Patient taking anticoagulants yes     ASSESSMENT:    Changes in Assessment Throughout Shift: no     Patient has Central Line: yes Reasons if yes: antibiotics   Patient has Walker Cath: no Reasons if yes: n/a      Last Vitals:     Vitals:    04/22/17 0920 04/22/17 1144 04/22/17 1517 04/22/17 1558   BP:  149/71  (!) 166/91   Pulse:  95  (!) 105   Resp:  17 19   Temp:  96.8 °F (36 °C)  97.7 °F (36.5 °C)   SpO2: 96% 92% 96% 96%   Weight:            IV and DRAINS (will only show if present)   Peripheral IV 04/19/17 Right External jugular-Site Assessment: Clean, dry, & intact  [REMOVED] Peripheral IV 04/19/17 Right Antecubital-Site Assessment: Clean, dry, & intact  PICC Double Lumen 93/12/66 Right;Basilic-Site Assessment: Clean, dry, & intact  [REMOVED] Peripheral IV 04/18/17 Right Antecubital-Site Assessment: Clean, dry, & intact     WOUND (if present)   Wound Type:  none   Dressing present Dressing Present : No   Wound Concerns/Notes:  none     PAIN    Pain Assessment    Pain Intensity 1: 6 (04/22/17 9171)    Pain Location 1: Rib cage    Pain Intervention(s) 1: Medication (see MAR)    Patient Stated Pain Goal: 0  o Interventions for Pain:  percocet  o Intervention effective: yes  o Time of last intervention: 1754   o Reassessment Completed: yes      Last 3 Weights:  Last 3 Recorded Weights in this Encounter    04/18/17 1036 04/22/17 0719   Weight: 113.4 kg (250 lb) 109.8 kg (242 lb)     Weight change:      INTAKE/OUPUT    Current Shift:      Last three shifts: 04/21 0701 - 04/22 1900  In: 8793 [P.O.:1060; I.V.:150]  Out: 400 [Urine:400]     LAB RESULTS     Recent Labs      04/22/17   0525  04/21/17   0443  04/20/17   0435   WBC  10.1  9.9  12.1   HGB  12.2  13.2  12.5   HCT  38.9  42.3  39.4   PLT  200  243  223        Recent Labs      04/22/17   0525  04/21/17   0443  04/20/17   0435   NA  142  140  143   K  3.1*  3.7  3.8   GLU  86  125*  145*   BUN  17  18  16   CREA  0.85  1.12  1.03   CA  7.4*  8.6  8.9   MG  1.8  2.1  2.3       RECOMMENDATIONS AND DISCHARGE PLANNING     1. Pending tests/procedures/ Plan of Care or Other Needs: Discharge     2. Discharge plan for patient and Needs/Barriers: Home    3. Estimated Discharge Date: TBD Posted on Whiteboard in Patients Room: no      4. The patient's care plan was reviewed with the oncoming nurse. \"HEALS\" SAFETY CHECK      Fall Risk    Total Score: 1    Safety Measures: Safety Measures: Bed/Chair alarm on, Bed/Chair-Wheels locked, Bed in low position, Call light within reach, Side rails X 3    A safety check occurred in the patient's room between off going nurse and oncoming nurse listed above.     The safety check included the below items  Area Items   H  High Alert Medications - Verify all high alert medication drips (heparin, PCA, etc.)   E  Equipment - Suction is set up for ALL patients (with yanker)  - Red plugs utilized for all equipment (IV pumps, etc.)  - WOWs wiped down at end of shift.  - Room stocked with oxygen, suction, and other unit-specific supplies   A  Alarms - Bed alarm is set for fall risk patients  - Ensure chair alarm is in place and activated if patient is up in a chair   L  Lines - Check IV for any infiltration  - Walker bag is empty if patient has a Walker   - Tubing and IV bags are labeled   S  Safety   - Room is clean, patient is clean, and equipment is clean. - Hallways are clear from equipment besides carts. - Fall bracelet on for fall risk patients  - Ensure room is clear and free of clutter  - Suction is set up for ALL patients (with yanker)  - Hallways are clear from equipment besides carts.    - Isolation precautions followed, supplies available outside room, sign posted     Cris Rivera RN

## 2017-04-23 NOTE — ROUTINE PROCESS
Bedside and Verbal shift change report given to Fernanda Alaniz RN (oncoming nurse) by Jose Gomez RN (offgoing nurse). Report included the following information SBAR, Kardex, MAR and Recent Results.     SITUATION:    Code Status: Full Code  Reason for Admission: COPD exacerbation   Acute asthma exacerbation    [facility] day: 2   Problem List:       Hospital Problems  Date Reviewed: 3/10/2017          Codes Class Noted POA    Acute asthma exacerbation ICD-10-CM: J45.901  ICD-9-CM: 493.92  4/21/2017 Unknown              BACKGROUND:    Past Medical History:   Past Medical History:   Diagnosis Date    Asthma     Chronic obstructive pulmonary disease ([facility] [address]     Endocrine disease     thyroid issues    Gastrointestinal disorder     \"blockage in my stomach\"    Hypertension          Patient taking anticoagulants yes     ASSESSMENT:    Changes in Assessment Throughout Shift: no     Patient has Central Line: yes Reasons if yes: antibiotics   Patient has Walker Cath: no Reasons if yes: n/a      Last Vitals:     Vitals:    04/22/17 2015 04/22/17 2100 04/22/17 2351 04/23/17 0358   BP:  158/78 175/73 178/78   Pulse:  97 99 82   Resp:  18 18 18   Temp:  98.1 °F (36.7 °C) 98.6 °F (37 °C) 98.5 °F (36.9 °C)   SpO2: 99% 97% 96% 97%   Weight:            IV and DRAINS (will only show if present)   Peripheral IV 04/19/17 Right External jugular-Site Assessment: Clean, dry, & intact  [REMOVED] Peripheral IV 04/19/17 Right Antecubital-Site Assessment: Clean, dry, & intact  PICC Double Lumen 58/97/77 Right;Basilic-Site Assessment: Clean, dry, & intact  [REMOVED] Peripheral IV 04/18/17 Right Antecubital-Site Assessment: Clean, dry, & intact     WOUND (if present)   Wound Type:  none   Dressing present Dressing Present : No   Wound Concerns/Notes:  none     PAIN    Pain Assessment    Pain Intensity 1: 0 (04/23/17 0050)    Pain Location 1: Chest    Pain Intervention(s) 1: Medication (see MAR)    Patient Stated Pain Goal: 0  o Interventions for Pain:  percocet  o Intervention effective: yes  o Time of last intervention: 1754   o Reassessment Completed: yes      Last 3 Weights:  Last 3 Recorded Weights in this Encounter    04/18/17 1036 04/22/17 0719   Weight: 113.4 kg (250 lb) 109.8 kg (242 lb)     Weight change:      INTAKE/OUPUT    Current Shift:      Last three shifts: 04/21 0701 - 04/22 1900  In: 8357 [P.O.:1060; I.V.:150]  Out: 400 [Urine:400]     LAB RESULTS     Recent Labs      04/22/17   0525  04/21/17   0443   WBC  10.1  9.9   HGB  12.2  13.2   HCT  38.9  42.3   PLT  200  243        Recent Labs      04/22/17   0525 04/21/17 0443   NA  142  140   K  3.1*  3.7   GLU  86  125*   BUN  17  18   CREA  0.85  1.12   CA  7.4*  8.6   MG  1.8  2.1       RECOMMENDATIONS AND DISCHARGE PLANNING     1. Pending tests/procedures/ Plan of Care or Other Needs: Discharge     2. Discharge plan for patient and Needs/Barriers: Home    3. Estimated Discharge Date: TBD Posted on Whiteboard in Patients Room: no      4. The patient's care plan was reviewed with the oncoming nurse. \"HEALS\" SAFETY CHECK      Fall Risk    Total Score: 1    Safety Measures: Safety Measures: Bed/Chair alarm on, Bed/Chair-Wheels locked, Bed in low position, Call light within reach, Side rails X 3    A safety check occurred in the patient's room between off going nurse and oncoming nurse listed above.     The safety check included the below items  Area Items   H  High Alert Medications - Verify all high alert medication drips (heparin, PCA, etc.)   E  Equipment - Suction is set up for ALL patients (with yanker)  - Red plugs utilized for all equipment (IV pumps, etc.)  - WOWs wiped down at end of shift.  - Room stocked with oxygen, suction, and other unit-specific supplies   A  Alarms - Bed alarm is set for fall risk patients  - Ensure chair alarm is in place and activated if patient is up in a chair   L  Lines - Check IV for any infiltration  - Walker bag is empty if patient has a Walker   - Tubing and IV bags are labeled   S  Safety   - Room is clean, patient is clean, and equipment is clean. - Hallways are clear from equipment besides carts. - Fall bracelet on for fall risk patients  - Ensure room is clear and free of clutter  - Suction is set up for ALL patients (with yanker)  - Hallways are clear from equipment besides carts.    - Isolation precautions followed, supplies available outside room, sign posted     Reginold Nageotte, RN

## 2017-04-23 NOTE — ROUTINE PROCESS
Bedside and Verbal shift change report given to Junior Fish RN (oncoming nurse) by Doreen Snell RN (offgoing nurse). Report included the following information SBAR, Kardex, MAR and Recent Results.     SITUATION:    Code Status: Full Code   Reason for Admission: COPD exacerbation   Acute asthma exacerbation    [facility] day: 2   Problem List:       Hospital Problems  Date Reviewed: 3/10/2017          Codes Class Noted POA    Acute asthma exacerbation ICD-10-CM: J45.901  ICD-9-CM: 493.92  4/21/2017 Unknown              BACKGROUND:    Past Medical History:   Past Medical History:   Diagnosis Date    Asthma     Chronic obstructive pulmonary disease ([facility] [address]     Endocrine disease     thyroid issues    Gastrointestinal disorder     \"blockage in my stomach\"    Hypertension          Patient taking anticoagulants yes     ASSESSMENT:    Changes in Assessment Throughout Shift: no     Patient has Central Line: yes Reasons if yes: poor veins   Patient has Walker Cath: no Reasons if yes: n/a      Last Vitals:     Vitals:    04/23/17 0723 04/23/17 0933 04/23/17 1130 04/23/17 1620   BP: 168/87 168/87 157/85 161/86   Pulse: 89 89 92 92   Resp: 18  18 18   Temp: 97.1 °F (36.2 °C)  97.3 °F (36.3 °C) 98.4 °F (36.9 °C)   SpO2: 97%  96% 94%   Weight: 109.3 kg (241 lb)           IV and DRAINS (will only show if present)   [REMOVED] Peripheral IV 04/19/17 Right External jugular-Site Assessment: Clean, dry, & intact  [REMOVED] Peripheral IV 04/19/17 Right Antecubital-Site Assessment: Clean, dry, & intact  PICC Double Lumen 80/17/99 Right;Basilic-Site Assessment: Clean, dry, & intact  [REMOVED] Peripheral IV 04/18/17 Right Antecubital-Site Assessment: Clean, dry, & intact     WOUND (if present)   Wound Type:  none   Dressing present Dressing Present : No   Wound Concerns/Notes:  none     PAIN    Pain Assessment    Pain Intensity 1: 5 (04/23/17 1810)    Pain Location 1: Rib cage    Pain Intervention(s) 1: Medication (see MAR)    Patient Stated Pain Goal: 0  o Interventions for Pain:  percocet  o Intervention effective: yes  o Time of last intervention: 1710   o Reassessment Completed: yes      Last 3 Weights:  Last 3 Recorded Weights in this Encounter    04/18/17 1036 04/22/17 0719 04/23/17 0723   Weight: 113.4 kg (250 lb) 109.8 kg (242 lb) 109.3 kg (241 lb)     Weight change:      INTAKE/OUPUT    Current Shift:      Last three shifts: 04/22 0701 - 04/23 1900  In: 2352 [P.O.:820; I.V.:250]  Out: 400 [Urine:400]     LAB RESULTS     Recent Labs      04/23/17   0530  04/22/17   0525  04/21/17   0443   WBC  12.1  10.1  9.9   HGB  12.8  12.2  13.2   HCT  39.8  38.9  42.3   PLT  236  200  243        Recent Labs      04/23/17   0530  04/22/17   0525  04/21/17   0443   NA  141  142  140   K  4.0  3.1*  3.7   GLU  106*  86  125*   BUN  18 17  18   CREA  0.74  0.85  1.12   CA  8.0*  7.4*  8.6   MG  2.3  1.8  2.1       RECOMMENDATIONS AND DISCHARGE PLANNING     1. Pending tests/procedures/ Plan of Care or Other Needs: Discharge     2. Discharge plan for patient and Needs/Barriers: Home    3. Estimated Discharge Date: TBD Posted on Whiteboard in Patients Room: yes      4. The patient's care plan was reviewed with the oncoming nurse. \"HEALS\" SAFETY CHECK      Fall Risk    Total Score: 1    Safety Measures: Safety Measures: Bed/Chair-Wheels locked, Bed in low position, Call light within reach    A safety check occurred in the patient's room between off going nurse and oncoming nurse listed above.     The safety check included the below items  Area Items   H  High Alert Medications - Verify all high alert medication drips (heparin, PCA, etc.)   E  Equipment - Suction is set up for ALL patients (with yanker)  - Red plugs utilized for all equipment (IV pumps, etc.)  - WOWs wiped down at end of shift.  - Room stocked with oxygen, suction, and other unit-specific supplies   A  Alarms - Bed alarm is set for fall risk patients  - Ensure chair alarm is in place and activated if patient is up in a chair   L  Lines - Check IV for any infiltration  - Walker bag is empty if patient has a Walker   - Tubing and IV bags are labeled   S  Safety   - Room is clean, patient is clean, and equipment is clean. - Hallways are clear from equipment besides carts. - Fall bracelet on for fall risk patients  - Ensure room is clear and free of clutter  - Suction is set up for ALL patients (with yanker)  - Hallways are clear from equipment besides carts.    - Isolation precautions followed, supplies available outside room, sign posted     Cris Rivera RN

## 2017-04-24 VITALS
SYSTOLIC BLOOD PRESSURE: 172 MMHG | WEIGHT: 241 LBS | OXYGEN SATURATION: 92 % | DIASTOLIC BLOOD PRESSURE: 95 MMHG | TEMPERATURE: 97.5 F | HEART RATE: 94 BPM | BODY MASS INDEX: 44.08 KG/M2 | RESPIRATION RATE: 16 BRPM

## 2017-04-24 LAB
ANION GAP BLD CALC-SCNC: 5 MMOL/L (ref 3–18)
BASOPHILS # BLD AUTO: 0 K/UL (ref 0–0.1)
BASOPHILS # BLD: 0 % (ref 0–2)
BUN SERPL-MCNC: 27 MG/DL (ref 7–18)
BUN/CREAT SERPL: 24 (ref 12–20)
CALCIUM SERPL-MCNC: 8.9 MG/DL (ref 8.5–10.1)
CHLORIDE SERPL-SCNC: 103 MMOL/L (ref 100–108)
CO2 SERPL-SCNC: 30 MMOL/L (ref 21–32)
CREAT SERPL-MCNC: 1.12 MG/DL (ref 0.6–1.3)
DIFFERENTIAL METHOD BLD: ABNORMAL
EOSINOPHIL # BLD: 0 K/UL (ref 0–0.4)
EOSINOPHIL NFR BLD: 0 % (ref 0–5)
ERYTHROCYTE [DISTWIDTH] IN BLOOD BY AUTOMATED COUNT: 15.9 % (ref 11.6–14.5)
GLUCOSE BLD STRIP.AUTO-MCNC: 109 MG/DL (ref 70–110)
GLUCOSE SERPL-MCNC: 132 MG/DL (ref 74–99)
HCT VFR BLD AUTO: 43 % (ref 35–45)
HGB BLD-MCNC: 13.9 G/DL (ref 12–16)
LYMPHOCYTES # BLD AUTO: 8 % (ref 21–52)
LYMPHOCYTES # BLD: 1.1 K/UL (ref 0.9–3.6)
MAGNESIUM SERPL-MCNC: 2.2 MG/DL (ref 1.6–2.6)
MCH RBC QN AUTO: 27 PG (ref 24–34)
MCHC RBC AUTO-ENTMCNC: 32.3 G/DL (ref 31–37)
MCV RBC AUTO: 83.7 FL (ref 74–97)
MONOCYTES # BLD: 0.3 K/UL (ref 0.05–1.2)
MONOCYTES NFR BLD AUTO: 2 % (ref 3–10)
NEUTS SEG # BLD: 11.5 K/UL (ref 1.8–8)
NEUTS SEG NFR BLD AUTO: 90 % (ref 40–73)
PLATELET # BLD AUTO: 248 K/UL (ref 135–420)
PMV BLD AUTO: 11.2 FL (ref 9.2–11.8)
POTASSIUM SERPL-SCNC: 4.9 MMOL/L (ref 3.5–5.5)
RBC # BLD AUTO: 5.14 M/UL (ref 4.2–5.3)
SODIUM SERPL-SCNC: 138 MMOL/L (ref 136–145)
WBC # BLD AUTO: 12.8 K/UL (ref 4.6–13.2)

## 2017-04-24 PROCEDURE — 74011250637 HC RX REV CODE- 250/637: Performed by: FAMILY MEDICINE

## 2017-04-24 PROCEDURE — 83735 ASSAY OF MAGNESIUM: CPT | Performed by: NURSE PRACTITIONER

## 2017-04-24 PROCEDURE — 82962 GLUCOSE BLOOD TEST: CPT

## 2017-04-24 PROCEDURE — 80048 BASIC METABOLIC PNL TOTAL CA: CPT | Performed by: NURSE PRACTITIONER

## 2017-04-24 PROCEDURE — 74011000250 HC RX REV CODE- 250: Performed by: INTERNAL MEDICINE

## 2017-04-24 PROCEDURE — 74011250637 HC RX REV CODE- 250/637: Performed by: INTERNAL MEDICINE

## 2017-04-24 PROCEDURE — 85025 COMPLETE CBC W/AUTO DIFF WBC: CPT | Performed by: NURSE PRACTITIONER

## 2017-04-24 PROCEDURE — 74011250636 HC RX REV CODE- 250/636: Performed by: NURSE PRACTITIONER

## 2017-04-24 PROCEDURE — 94640 AIRWAY INHALATION TREATMENT: CPT

## 2017-04-24 PROCEDURE — 77010033678 HC OXYGEN DAILY

## 2017-04-24 PROCEDURE — 74011000258 HC RX REV CODE- 258: Performed by: INTERNAL MEDICINE

## 2017-04-24 PROCEDURE — 74011000250 HC RX REV CODE- 250: Performed by: NURSE PRACTITIONER

## 2017-04-24 RX ORDER — PREDNISONE 20 MG/1
40 TABLET ORAL
Status: DISCONTINUED | OUTPATIENT
Start: 2017-04-25 | End: 2017-04-24 | Stop reason: HOSPADM

## 2017-04-24 RX ORDER — ALPRAZOLAM 1 MG/1
1 TABLET ORAL 2 TIMES DAILY
Qty: 20 TAB | Refills: 0 | Status: SHIPPED | OUTPATIENT
Start: 2017-04-24 | End: 2019-11-03

## 2017-04-24 RX ORDER — DOXYCYCLINE 100 MG/1
100 CAPSULE ORAL 2 TIMES DAILY
Qty: 2 CAP | Refills: 0 | Status: SHIPPED | OUTPATIENT
Start: 2017-04-24 | End: 2017-12-21

## 2017-04-24 RX ORDER — PREDNISONE 20 MG/1
40 TABLET ORAL
Qty: 3 TAB | Refills: 0 | Status: SHIPPED | OUTPATIENT
Start: 2017-04-25 | End: 2017-12-21

## 2017-04-24 RX ORDER — IPRATROPIUM BROMIDE AND ALBUTEROL SULFATE 2.5; .5 MG/3ML; MG/3ML
3 SOLUTION RESPIRATORY (INHALATION)
Status: DISCONTINUED | OUTPATIENT
Start: 2017-04-24 | End: 2017-04-24 | Stop reason: HOSPADM

## 2017-04-24 RX ORDER — OXYCODONE AND ACETAMINOPHEN 10; 325 MG/1; MG/1
1 TABLET ORAL
Qty: 20 TAB | Refills: 0 | Status: ON HOLD | OUTPATIENT
Start: 2017-04-24 | End: 2019-03-14 | Stop reason: SDUPTHER

## 2017-04-24 RX ADMIN — DOXYCYCLINE 100 MG: 100 INJECTION, POWDER, LYOPHILIZED, FOR SOLUTION INTRAVENOUS at 10:47

## 2017-04-24 RX ADMIN — AMLODIPINE BESYLATE 10 MG: 10 TABLET ORAL at 10:48

## 2017-04-24 RX ADMIN — FLUOXETINE 60 MG: 20 CAPSULE ORAL at 10:55

## 2017-04-24 RX ADMIN — BUDESONIDE 500 MCG: 0.5 INHALANT RESPIRATORY (INHALATION) at 10:09

## 2017-04-24 RX ADMIN — OXYCODONE HYDROCHLORIDE AND ACETAMINOPHEN 2 TABLET: 5; 325 TABLET ORAL at 10:48

## 2017-04-24 RX ADMIN — METHYLPREDNISOLONE SODIUM SUCCINATE 40 MG: 40 INJECTION, POWDER, FOR SOLUTION INTRAMUSCULAR; INTRAVENOUS at 10:47

## 2017-04-24 RX ADMIN — HYDRALAZINE HYDROCHLORIDE 50 MG: 50 TABLET, FILM COATED ORAL at 10:49

## 2017-04-24 RX ADMIN — FAMOTIDINE 20 MG: 20 TABLET ORAL at 10:48

## 2017-04-24 RX ADMIN — ARFORMOTEROL TARTRATE 15 MCG: 15 SOLUTION RESPIRATORY (INHALATION) at 10:09

## 2017-04-24 RX ADMIN — IPRATROPIUM BROMIDE AND ALBUTEROL SULFATE 3 ML: .5; 3 SOLUTION RESPIRATORY (INHALATION) at 10:09

## 2017-04-24 RX ADMIN — Medication 10 ML: at 04:17

## 2017-04-24 RX ADMIN — OXYCODONE HYDROCHLORIDE AND ACETAMINOPHEN 2 TABLET: 5; 325 TABLET ORAL at 06:39

## 2017-04-24 NOTE — PROGRESS NOTES
[facility]  Pulmonary, Critical Care, and Sleep Medicine    Name: Wolf Maciel MRN: [id number]   : 1965 Hospital: [address] [place] [address]   Date: 2017        IMPRESSION:   · Acute/chronic hypoxemic respiratory failure with acute hypercarbia in the setting of COPD/asthma with exacerbation, improved  · COPD with exacerbation/appears to be chronic bronchitis, resolved  · Acute bronchitis   · Severe asthma  · Chronic hypoxemia on home o2- 2L  · MY on CPAP- noncompliant   · HTN  · Obesity- BMI 45  · Hyperglycemia in the setting of steroid use     Hx of vocal cord dysfunction   Hx of intubation and frequent admissions for respiratory failure  Hx of cocaine and heroin abuse  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 HOB > 30 degrees. · No wheezing this AM - will make duonebs PRN. · Has completed 7 days of IV steroids - will change to PO for 10 day course. · Would continue steroid inhaler as an outpatient along with albuterol PRN as outpatient until follows up as outpatient. Will need PFT's. · Supplemental oxygen for SpO2 >90%. · Lower ext doppler negative for DVT  · Rapid flu negative. Sputum cx with normal jimena. · Abx- doxycycline- total of 5 days    · Patient states she will be following up with Dr. Aminta Cruz as an outpatient. Stable for discharge from pulmonary standpoint with follow up in office. Subjective/Interval History:     Patient is a 45 y.o. female pmhx of severe asthma, vocal cord dysfunction, COPD, HTN, polysubstance abuse, and chronic hypoxemia admitted for acute exacerbation of COPD/asthma.   No overnight events  No complaints -wants to go home  Weaning steroids.        ROS:Constitutional: negative  Eyes: negative  Ears, nose, mouth, throat, and face: negative  Respiratory: positive for cough, sputum, asthma, wheezing or dyspnea on exertion  Cardiovascular: positive for chest pressure/discomfort, orthopnea  Gastrointestinal: negative  Genitourinary:negative  Integument/breast: negative  Hematologic/lymphatic: negative  Musculoskeletal:positive for B/l lower ext leg pain  Neurological: negative  Behavioral/Psych: negative  Endocrine: negative    Objective:   Vital Signs:    Visit Vitals    /90 (BP 1 Location: Left arm, BP Patient Position: At rest)    Pulse 84    Temp 97.5 °F (36.4 °C)    Resp 18    Wt 109.3 kg (241 lb)    SpO2 99%    BMI 44.08 kg/m2       O2 Device: Nasal cannula   O2 Flow Rate (L/min): 1.5 l/min   Temp (24hrs), Av.9 °F (36.6 °C), Min:97.3 °F (36.3 °C), Max:98.4 °F (36.9 °C)       Intake/Output:   Last shift:      701 - 1900  In: 480 [P.O.:480]  Out: -   Last 3 shifts: 1901 -  07  In: 580 [P.O.:480; I.V.:100]  Out: -     Intake/Output Summary (Last 24 hours) at 17 1047  Last data filed at 17 0925   Gross per 24 hour   Intake              480 ml   Output                0 ml   Net              480 ml        Physical Exam:    General: in no apparent distress, non-toxic, alert, oriented times 3, normal vitals and cooperative   HEENT: Normocephalic and atraumatic. Neck: Trachea midline, no JVD      Lungs: Moderate air entry bilaterally.  Lungs clear without wheezing   Heart: regular rate and regular rhythm    Abdomen: abdomen is soft without significant tenderness, masses, organomegaly or guarding   Extremity: BLE 1+ edema   Neuro: alert   Skin: Warm and dry         DATA:  Labs:  Recent Labs      17   0415  17   0530  17   0525   WBC  12.8  12.1  10.1   HGB  13.9  12.8  12.2   HCT  43.0  39.8  38.9   PLT  248  236  200     Recent Labs      17   0415  17   0530  17   0525   NA  138  141  142   K  4.9  4.0  3.1*   CL  103  107  107   CO2  30  28  27   GLU  132*  106*  86   BUN  27*  18  17   CREA  1.12  0.74  0.85   CA  8.9  8.0*  7.4*   MG  2.2  2.3  1.8     No results for input(s): PH, PCO2, PO2, HCO3, FIO2 in the last 72 hours.     Imaging:   No new imaging 4/24      Jono Whitney PA-C

## 2017-04-24 NOTE — ROUTINE PROCESS
Bedside and Verbal shift change report given to ROSALINE Prado (oncoming nurse) by Alessia Carrero RN (offgoing nurse). Report included the following information SBAR, Kardex, MAR and Recent Results.     SITUATION:    Code Status: Full Code  Reason for Admission: COPD exacerbation   Acute asthma exacerbation    [facility] day: 3   Problem List:       Hospital Problems  Date Reviewed: 3/10/2017          Codes Class Noted POA    Acute asthma exacerbation ICD-10-CM: J45.901  ICD-9-CM: 493.92  4/21/2017 Unknown              BACKGROUND:    Past Medical History:   Past Medical History:   Diagnosis Date    Asthma     Chronic obstructive pulmonary disease ([facility] [address]     Endocrine disease     thyroid issues    Gastrointestinal disorder     \"blockage in my stomach\"    Hypertension          Patient taking anticoagulants yes     ASSESSMENT:    Changes in Assessment Throughout Shift: no     Patient has Central Line: PICC line/yes Reasons if yes: poor veins   Patient has Walker Cath: no Reasons if yes: n/a      Last Vitals:     Vitals:    04/23/17 1130 04/23/17 1620 04/23/17 2014 04/24/17 0417   BP: 157/85 161/86 138/72 140/70   Pulse: 92 92 86 88   Resp: 18 18 18 18   Temp: 97.3 °F (36.3 °C) 98.4 °F (36.9 °C) 98.1 °F (36.7 °C) 98.2 °F (36.8 °C)   SpO2: 96% 94% 93% 96%   Weight:            IV and DRAINS (will only show if present)   [REMOVED] Peripheral IV 04/19/17 Right External jugular-Site Assessment: Clean, dry, & intact  [REMOVED] Peripheral IV 04/19/17 Right Antecubital-Site Assessment: Clean, dry, & intact  PICC Double Lumen 97/89/65 Right;Basilic-Site Assessment: Clean, dry, & intact  [REMOVED] Peripheral IV 04/18/17 Right Antecubital-Site Assessment: Clean, dry, & intact     WOUND (if present)   Wound Type:  none   Dressing present Dressing Present : No   Wound Concerns/Notes:  none     PAIN    Pain Assessment    Pain Intensity 1: 0 (04/24/17 0310)    Pain Location 1: Rib cage    Pain Intervention(s) 1: Rest    Patient Stated Pain Goal: 0  o Interventions for Pain:  percocet  o Intervention effective: yes  o Time of last intervention: 1710   o Reassessment Completed: yes      Last 3 Weights:  Last 3 Recorded Weights in this Encounter    04/18/17 1036 04/22/17 0719 04/23/17 0723   Weight: 113.4 kg (250 lb) 109.8 kg (242 lb) 109.3 kg (241 lb)     Weight change: -0.454 kg (-1 lb)     INTAKE/OUPUT    Current Shift:      Last three shifts: 04/22 0701 - 04/23 1900  In: 2131 [P.O.:820; I.V.:250]  Out: 400 [Urine:400]     LAB RESULTS     Recent Labs      04/24/17   0415 04/23/17   0530  04/22/17   0525   WBC  12.8  12.1  10.1   HGB  13.9  12.8  12.2   HCT  43.0  39.8  38.9   PLT  248  236  200        Recent Labs      04/24/17 0415 04/23/17   0530  04/22/17   0525   NA  138  141  142   K  4.9  4.0  3.1*   GLU  132*  106*  86   BUN  27*  18  17   CREA  1.12  0.74  0.85   CA  8.9  8.0*  7.4*   MG  2.2  2.3  1.8       RECOMMENDATIONS AND DISCHARGE PLANNING     1. Pending tests/procedures/ Plan of Care or Other Needs: breathing tx, pain management    2. Discharge plan for patient and Needs/Barriers: Home    3. Estimated Discharge Date: TBD Posted on Whiteboard in Patients Room: yes      4. The patient's care plan was reviewed with the oncoming nurse. \"HEALS\" SAFETY CHECK      Fall Risk    Total Score: 1    Safety Measures: Safety Measures: Bed/Chair-Wheels locked, Bed in low position, Call light within reach, Fall prevention (comment), Gripper socks, Family at bedside    A safety check occurred in the patient's room between off going nurse and oncoming nurse listed above.     The safety check included the below items  Area Items   H  High Alert Medications - Verify all high alert medication drips (heparin, PCA, etc.)   E  Equipment - Suction is set up for ALL patients (with yanker)  - Red plugs utilized for all equipment (IV pumps, etc.)  - WOWs wiped down at end of shift.  - Room stocked with oxygen, suction, and other unit-specific supplies   A  Alarms - Bed alarm is set for fall risk patients  - Ensure chair alarm is in place and activated if patient is up in a chair   L  Lines - Check IV for any infiltration  - Walker bag is empty if patient has a Walker   - Tubing and IV bags are labeled   S  Safety   - Room is clean, patient is clean, and equipment is clean. - Hallways are clear from equipment besides carts. - Fall bracelet on for fall risk patients  - Ensure room is clear and free of clutter  - Suction is set up for ALL patients (with yanker)  - Hallways are clear from equipment besides carts.    - Isolation precautions followed, supplies available outside room, sign posted     Prabhjot Jain RN

## 2017-04-24 NOTE — PROGRESS NOTES
2200  Pt alert, NAD, stable, attempt to check her blood sugar but refused. Education provided, verbalized understanding but continue to refuse. 0044  Pt alert, NAD, tried to take her v/s but patient refuse. Per CNA, pt doesn't want to be bother. Education provided but continue to refuse.

## 2017-04-24 NOTE — PROGRESS NOTES
Care Management Interventions  PCP Verified by CM: Yes (pt refused to provide name)  Palliative Care Consult (Criteria: CHF and RRAT>21): No  Reason for No Palliative Care Consult: Other (see comment) (no order)  Mode of Transport at Discharge:  Other (see comment) (Family will provide transport home)  Transition of Care Consult (CM Consult): Discharge Planning  Current Support Network: Own Home, Family Lives Nearby, Lives Alone  Confirm Follow Up Transport: Family  Plan discussed with Pt/Family/Caregiver: Yes (pt states whe will make her own appointments )  Discharge Location  Discharge Placement: Home with family assistance

## 2017-04-24 NOTE — DISCHARGE SUMMARY
Discharge Summary    Patient: Myron Orta MRN: [id number]  CSN: [id number]    YOB: 1965  Age: 45 y.o. Sex: female    DOA: 4/18/2017 LOS:  LOS: 3 days   Discharge Date: 4/24/2017     Admission Diagnoses:   COPD with acute exacerbation    Discharge Diagnoses:    Acute on chronic hypoxemic respiratory failure  COPD with acute exacerbation  Acute on chronic bronchitis  Severe, persistent asthma  MY   HTN  Morbid obesity  Medical noncompliance  Chronic prescription narcotic and benzodiazepine use      Discharge Condition: Stable    PHYSICAL EXAM  Visit Vitals    BP (!) 172/95 (BP 1 Location: Left arm, BP Patient Position: At rest)    Pulse 94    Temp 97.5 °F (36.4 °C)    Resp 16    Wt 109.3 kg (241 lb)    SpO2 92%    BMI 44.08 kg/m2       General: In NAD. HEENT: NCAT. Sclerae anicteric. Lungs:  Occasional EEW, effort nonlabored. Heart:  Regular  rhythm,  No murmur, No Rubs, No Gallops  Abdomen: Soft, Non distended, Non tender.  +Bowel sounds, no HSM  Extremities: Warm, no ischemia. Psych:   Good insight. Not anxious or agitated. Neurologic:  Awake and alert, motor grossly nonfocal.    Hospital Course:   Patient admitted to medical bed for inpatient management of acute asthma exacerbation. Patient continues to remain poorly compliant and has not followed up with a PCP on outpatient basis. Primary concern continues to remain receiving narcotic pain medications once she is admitted. IV/inhaled steroids and scheduled nebs have been continued as ordered in ED. She has showed gradual clinical improvement and is now felt to be medically stable for discharge home with outpatient follow up as advised. Consults:   PCCM    Significant Diagnostic Studies:   CXR:  IMPRESSION:     1. No infiltrate or consolidation.   2. Prominence of the cardiac silhouette.          Discharge Medications:     Current Discharge Medication List      START taking these medications    Details   predniSONE (DELTASONE) 20 mg tablet Take 2 Tabs by mouth daily (with breakfast). Qty: 3 Tab, Refills: 0      doxycycline (MONODOX) 100 mg capsule Take 1 Cap by mouth two (2) times a day. Qty: 2 Cap, Refills: 0         CONTINUE these medications which have CHANGED    Details   ALPRAZolam (XANAX) 1 mg tablet Take 1 Tab by mouth two (2) times a day. Max Daily Amount: 2 mg. Qty: 20 Tab, Refills: 0      oxyCODONE-acetaminophen (PERCOCET 10)  mg per tablet Take 1 Tab by mouth every eight (8) hours as needed. Max Daily Amount: 3 Tabs. Qty: 20 Tab, Refills: 0         CONTINUE these medications which have NOT CHANGED    Details   albuterol (PROVENTIL HFA, VENTOLIN HFA, PROAIR HFA) 90 mcg/actuation inhaler Take 1 Puff by inhalation every four (4) hours as needed for Wheezing. Qty: 1 Inhaler, Refills: 0      albuterol (PROVENTIL VENTOLIN) 2.5 mg /3 mL (0.083 %) nebulizer solution 3 mL by Nebulization route every four (4) hours as needed for Wheezing or Shortness of Breath. Qty: 100 Each, Refills: 1      amLODIPine (NORVASC) 10 mg tablet Take 1 Tab by mouth daily. Qty: 30 Tab, Refills: 1      budesonide-formoterol (SYMBICORT) 160-4.5 mcg/actuation HFA inhaler Take 2 Puffs by inhalation two (2) times a day. Qty: 1 Inhaler, Refills: 1      bumetanide (BUMEX) 0.5 mg tablet Take 1 Tab by mouth every fourty-eight (48) hours. Qty: 30 Tab, Refills: 1      famotidine (PEPCID) 20 mg tablet Take 1 Tab by mouth daily. Qty: 30 Tab, Refills: 1      FLUoxetine (PROZAC) 20 mg capsule Take 3 Caps by mouth daily. Qty: 90 Cap, Refills: 1      montelukast (SINGULAIR) 10 mg tablet Take 1 Tab by mouth nightly. Qty: 30 Tab, Refills: 1      tiotropium (SPIRIVA) 18 mcg inhalation capsule Take 1 Cap by inhalation daily. Qty: 30 Cap, Refills: 1      arformoterol (BROVANA) 15 mcg/2 mL nebu neb solution 2 mL by Nebulization route two (2) times a day.   Qty: 60 Vial, Refills: 1      budesonide (PULMICORT) 0.5 mg/2 mL nbsp 2 mL by Nebulization route two (2) times a day. Qty: 60 Each, Refills: 1      hydrALAZINE (APRESOLINE) 50 mg tablet Take 1 Tab by mouth three (3) times daily. Qty: 90 Tab, Refills: 1         STOP taking these medications       OTHER Comments:   Reason for Stopping:         OTHER Comments:   Reason for Stopping:         OTHER Comments:   Reason for Stopping:               Activity: As tolerated    Diet: Cardiac Diet and Diabetic Diet    Follow-up: with PCP 1-2 weeks. Luis Alberto Jaramillo MD  [facility]

## 2017-04-26 ENCOUNTER — TELEPHONE (OUTPATIENT)
Dept: OTHER | Age: 52
End: 2017-04-26

## 2017-05-18 ENCOUNTER — TELEPHONE (OUTPATIENT)
Dept: OTHER | Age: 52
End: 2017-05-18

## 2017-07-12 ENCOUNTER — OFFICE VISIT (OUTPATIENT)
Dept: OTOLARYNGOLOGY | Facility: CLINIC | Age: 52
End: 2017-07-12

## 2017-07-12 VITALS
SYSTOLIC BLOOD PRESSURE: 132 MMHG | HEART RATE: 71 BPM | TEMPERATURE: 99 F | BODY MASS INDEX: 34.91 KG/M2 | HEIGHT: 63 IN | DIASTOLIC BLOOD PRESSURE: 87 MMHG | WEIGHT: 197 LBS

## 2017-07-12 DIAGNOSIS — R22.1 NECK SWELLING: Primary | ICD-10-CM

## 2017-07-12 PROCEDURE — 99203 OFFICE O/P NEW LOW 30 MIN: CPT | Performed by: OTOLARYNGOLOGY

## 2017-07-12 PROCEDURE — 99212 OFFICE O/P EST SF 10 MIN: CPT | Performed by: OTOLARYNGOLOGY

## 2017-07-12 RX ORDER — IBUPROFEN 200 MG
200 TABLET ORAL EVERY 6 HOURS PRN
COMMUNITY

## 2017-07-12 RX ORDER — AMOXICILLIN AND CLAVULANATE POTASSIUM 875; 125 MG/1; MG/1
1 TABLET, FILM COATED ORAL EVERY 12 HOURS
Qty: 20 TABLET | Refills: 0 | Status: SHIPPED | OUTPATIENT
Start: 2017-07-12 | End: 2017-08-10

## 2017-07-12 RX ORDER — METHYLPREDNISOLONE 4 MG/1
TABLET ORAL
Qty: 1 PACKAGE | Refills: 0 | Status: SHIPPED | OUTPATIENT
Start: 2017-07-12

## 2017-07-12 NOTE — PROGRESS NOTES
Rahul Townsend is a 45year old female.   Patient presents with:  Throat Problem: patient presents for inflamed glands took zpak given by pcp no relief feels worse      HISTORY OF PRESENT ILLNESS    She presents with a several week history of generalized fat and easy bruising.            PHYSICAL EXAM    /87 (BP Location: Right arm, Patient Position: Sitting, Cuff Size: adult)   Pulse 71   Temp 98.7 °F (37.1 °C) (Tympanic)   Ht 5' 3\" (1.6 m)   Wt 197 lb (89.4 kg)   BMI 34.90 kg/m²        Constitutional N lymphadenopathy causing her neck swelling. Unclear as to the etiology. No fevers or other signs or symptoms of infection but she does have some discomfort in her throat.   Normal throat exam.  Start Medrol Dosepak and Augmentin for 10 days and return to c

## 2017-07-18 ENCOUNTER — TELEPHONE (OUTPATIENT)
Dept: OTOLARYNGOLOGY | Facility: CLINIC | Age: 52
End: 2017-07-18

## 2017-07-18 NOTE — TELEPHONE ENCOUNTER
Patient calling to see if she should come back for her follow up states she finished steroid pack this morning. Is feeling better but not 100% and is still taking her antibiotics. Would like a call back. appt is scheduled for 07/20/17. Please advise.  Thank

## 2017-07-20 ENCOUNTER — TELEPHONE (OUTPATIENT)
Dept: OTOLARYNGOLOGY | Facility: CLINIC | Age: 52
End: 2017-07-20

## 2017-07-20 NOTE — TELEPHONE ENCOUNTER
Pt states she read the rx instructions wrong, she has been taking 1 tablet a day instead of 1 every 12 hours, pt started taking rx correctly today but would like to know how JDO would like her to proceed. Pls advise thank you.

## 2017-07-20 NOTE — TELEPHONE ENCOUNTER
Consulted with Dr. Nils Khan, states the patient should complete the antibiotics. continue taking them as directed, and keep scheduled appointment. Informed the patient, patient verbalized understanding.

## 2017-07-20 NOTE — TELEPHONE ENCOUNTER
Patient states has been taking antibiotic amoxicillin-pot clavulanate wrong. States started medication on 7/12/17 as advised, but read the instructions wrong. States for one week has been taking medication only once a day instead of twice a day.  States sta

## 2017-07-25 ENCOUNTER — OFFICE VISIT (OUTPATIENT)
Dept: OTOLARYNGOLOGY | Facility: CLINIC | Age: 52
End: 2017-07-25

## 2017-07-25 VITALS
DIASTOLIC BLOOD PRESSURE: 62 MMHG | SYSTOLIC BLOOD PRESSURE: 128 MMHG | HEIGHT: 63 IN | TEMPERATURE: 99 F | BODY MASS INDEX: 34.91 KG/M2 | WEIGHT: 197 LBS

## 2017-07-25 DIAGNOSIS — R22.1 NECK SWELLING: Primary | ICD-10-CM

## 2017-07-25 PROCEDURE — 31575 DIAGNOSTIC LARYNGOSCOPY: CPT | Performed by: OTOLARYNGOLOGY

## 2017-07-25 PROCEDURE — 99212 OFFICE O/P EST SF 10 MIN: CPT | Performed by: OTOLARYNGOLOGY

## 2017-07-25 PROCEDURE — 99214 OFFICE O/P EST MOD 30 MIN: CPT | Performed by: OTOLARYNGOLOGY

## 2017-07-25 NOTE — PROGRESS NOTES
Radha Haines is a 47year old female. Patient presents with:   Follow - Up: patient presents for f/u on neck swelling finished medrol dose pack and augmentin and patient states she is no better      HISTORY OF PRESENT ILLNESS  She presents with a severa Respiratory Negative Dyspnea and wheezing. Cardio Negative Chest pain, irregular heartbeat/palpitations and syncope. GI Negative Abdominal pain and diarrhea. Endocrine Negative Cold intolerance and heat intolerance. Neuro Negative Tremors.    Psyc Procedures:  Endoscopy/Laryngoscopy  Pre-Procedure Care: Verbal consent was obtained. Procedure/risks were explained. Questions were answered. Correct patient identified. Correct side and site confirmed. A topical spray of ). 25% Neosynephrine wa

## 2017-07-26 ENCOUNTER — TELEPHONE (OUTPATIENT)
Dept: OTOLARYNGOLOGY | Facility: CLINIC | Age: 52
End: 2017-07-26

## 2017-07-26 NOTE — TELEPHONE ENCOUNTER
Called and spoke with the pt regarding  CT scan order, per [employer]- pt does not need prior authorization, pt verbalized understanding and agreed to call the scheduling department, also advised pt to call the insurance to verify co pay and out of the p

## 2017-08-03 ENCOUNTER — TELEPHONE (OUTPATIENT)
Dept: OTOLARYNGOLOGY | Facility: CLINIC | Age: 52
End: 2017-08-03

## 2017-08-03 NOTE — TELEPHONE ENCOUNTER
Left message for pt to call back to inform that Dr Cheryl Alexander would like for her to schedule an appointment to go over the results of her CT soft tissue of neck. Pt must bring in a CD of the images for Dr Claudia Mae review to her appointment.

## 2017-08-10 ENCOUNTER — OFFICE VISIT (OUTPATIENT)
Dept: OTOLARYNGOLOGY | Facility: CLINIC | Age: 52
End: 2017-08-10

## 2017-08-10 VITALS
HEIGHT: 64 IN | SYSTOLIC BLOOD PRESSURE: 118 MMHG | DIASTOLIC BLOOD PRESSURE: 62 MMHG | WEIGHT: 197 LBS | TEMPERATURE: 99 F | BODY MASS INDEX: 33.63 KG/M2

## 2017-08-10 DIAGNOSIS — R22.1 NECK SWELLING: Primary | ICD-10-CM

## 2017-08-10 PROCEDURE — 99212 OFFICE O/P EST SF 10 MIN: CPT | Performed by: OTOLARYNGOLOGY

## 2017-08-10 PROCEDURE — 99214 OFFICE O/P EST MOD 30 MIN: CPT | Performed by: OTOLARYNGOLOGY

## 2017-08-10 RX ORDER — MONTELUKAST SODIUM 10 MG/1
10 TABLET ORAL NIGHTLY
Qty: 30 TABLET | Refills: 3 | Status: SHIPPED | OUTPATIENT
Start: 2017-08-10

## 2017-08-10 RX ORDER — LORATADINE 10 MG/1
10 TABLET ORAL DAILY
Qty: 30 TABLET | Refills: 3 | Status: SHIPPED | OUTPATIENT
Start: 2017-08-10

## 2017-08-10 RX ORDER — AMOXICILLIN AND CLAVULANATE POTASSIUM 875; 125 MG/1; MG/1
1 TABLET, FILM COATED ORAL EVERY 12 HOURS
Qty: 20 TABLET | Refills: 0 | Status: SHIPPED | OUTPATIENT
Start: 2017-08-10

## 2017-08-10 RX ORDER — FLUTICASONE PROPIONATE 50 MCG
1 SPRAY, SUSPENSION (ML) NASAL 2 TIMES DAILY
Qty: 1 BOTTLE | Refills: 3 | Status: SHIPPED | OUTPATIENT
Start: 2017-08-10

## 2017-08-10 NOTE — PROGRESS NOTES
Radha Haines is a 47year old female. Patient presents with:   Follow - Up: patient presents for f/u on ct results       HISTORY OF PRESENT ILLNESS    She presents with a several week history of generalized fatigue throat discomfort and swelling to her use: No                Family History   Problem Relation Age of Onset   • Cancer Sister        Past Medical History:   Diagnosis Date   • Osteoarthritis        Past Surgical History:  No date: REMOVAL OF HEEL SPUR      REVIEW OF SYSTEMS    System Neg/Pos D Anterior cervical. Posterior cervical. Supraclavicular.         Nose/Mouth/Throat Normal External nose - Normal. Lips/teeth/gums - Normal. Tonsils - Normal. Oropharynx - Normal.   Nose/Mouth/Throat Normal Nares - Right: Normal Left: Normal. Septum -Normal

## 2017-08-11 ENCOUNTER — TELEPHONE (OUTPATIENT)
Dept: OTOLARYNGOLOGY | Facility: CLINIC | Age: 52
End: 2017-08-11

## 2017-08-11 NOTE — TELEPHONE ENCOUNTER
She has been on steroids recently. Repeated use of steroids carries risks. I did not say I would call in steroids only antibiotics. I would recommend using the antibiotics and see how she does.

## 2017-08-11 NOTE — TELEPHONE ENCOUNTER
pt called. Was seen yesterday with DULCE. She rec'd the antibiotics but not the steroid. Did Doctor change his mind for the steroid? Please advise.

## 2017-08-11 NOTE — TELEPHONE ENCOUNTER
, pt states you mentioned prescribing steroid during pt visit yesterday but no steroid sent. Please advise.

## 2017-12-19 ENCOUNTER — HOSPITAL ENCOUNTER (INPATIENT)
Age: 52
LOS: 2 days | Discharge: HOME | DRG: 140 | End: 2017-12-21
Attending: EMERGENCY MEDICINE | Admitting: INTERNAL MEDICINE
Payer: MEDICAID

## 2017-12-19 ENCOUNTER — APPOINTMENT (OUTPATIENT)
Dept: CT IMAGING | Age: 52
DRG: 140 | End: 2017-12-19
Attending: INTERNAL MEDICINE
Payer: MEDICAID

## 2017-12-19 ENCOUNTER — APPOINTMENT (OUTPATIENT)
Dept: GENERAL RADIOLOGY | Age: 52
DRG: 140 | End: 2017-12-19
Attending: PHYSICIAN ASSISTANT
Payer: MEDICAID

## 2017-12-19 DIAGNOSIS — J45.21 INTERMITTENT ASTHMA WITH ACUTE EXACERBATION, UNSPECIFIED ASTHMA SEVERITY: Primary | ICD-10-CM

## 2017-12-19 PROBLEM — I11.0 HYPERTENSIVE HEART FAILURE (HCC): Status: ACTIVE | Noted: 2017-12-19

## 2017-12-19 PROBLEM — J44.9 COPD (CHRONIC OBSTRUCTIVE PULMONARY DISEASE) WITH CHRONIC BRONCHITIS (HCC): Status: ACTIVE | Noted: 2017-12-19

## 2017-12-19 PROBLEM — G47.30 SLEEP APNEA: Status: ACTIVE | Noted: 2017-12-19

## 2017-12-19 LAB
ANION GAP SERPL CALC-SCNC: 5 MMOL/L (ref 3–18)
ARTERIAL PATENCY WRIST A: YES
ATRIAL RATE: 89 BPM
BASE EXCESS BLD CALC-SCNC: 1 MMOL/L
BASOPHILS # BLD: 0 K/UL (ref 0–0.1)
BASOPHILS NFR BLD: 0 % (ref 0–2)
BDY SITE: ABNORMAL
BNP SERPL-MCNC: 139 PG/ML (ref 0–900)
BUN SERPL-MCNC: 6 MG/DL (ref 7–18)
BUN/CREAT SERPL: 7 (ref 12–20)
CALCIUM SERPL-MCNC: 8.8 MG/DL (ref 8.5–10.1)
CALCULATED P AXIS, ECG09: 54 DEGREES
CALCULATED R AXIS, ECG10: 55 DEGREES
CALCULATED T AXIS, ECG11: 60 DEGREES
CHLORIDE SERPL-SCNC: 107 MMOL/L (ref 100–108)
CK MB CFR SERPL CALC: 0.8 % (ref 0–4)
CK MB CFR SERPL CALC: 3.3 % (ref 0–4)
CK MB SERPL-MCNC: 1.1 NG/ML (ref 5–25)
CK MB SERPL-MCNC: 1.7 NG/ML (ref 5–25)
CK SERPL-CCNC: 144 U/L (ref 26–192)
CK SERPL-CCNC: 52 U/L (ref 26–192)
CO2 SERPL-SCNC: 30 MMOL/L (ref 21–32)
CREAT SERPL-MCNC: 0.88 MG/DL (ref 0.6–1.3)
D DIMER PPP FEU-MCNC: 0.41 UG/ML(FEU)
DIAGNOSIS, 93000: NORMAL
DIFFERENTIAL METHOD BLD: NORMAL
EOSINOPHIL # BLD: 0.2 K/UL (ref 0–0.4)
EOSINOPHIL NFR BLD: 2 % (ref 0–5)
ERYTHROCYTE [DISTWIDTH] IN BLOOD BY AUTOMATED COUNT: 13.7 % (ref 11.6–14.5)
GAS FLOW.O2 O2 DELIVERY SYS: ABNORMAL L/MIN
GLUCOSE SERPL-MCNC: 87 MG/DL (ref 74–99)
HCO3 BLD-SCNC: 25.3 MMOL/L (ref 22–26)
HCT VFR BLD AUTO: 42.3 % (ref 35–45)
HGB BLD-MCNC: 14.1 G/DL (ref 12–16)
LYMPHOCYTES # BLD: 1.7 K/UL (ref 0.9–3.6)
LYMPHOCYTES NFR BLD: 24 % (ref 21–52)
MCH RBC QN AUTO: 29.3 PG (ref 24–34)
MCHC RBC AUTO-ENTMCNC: 33.3 G/DL (ref 31–37)
MCV RBC AUTO: 87.9 FL (ref 74–97)
MONOCYTES # BLD: 0.3 K/UL (ref 0.05–1.2)
MONOCYTES NFR BLD: 5 % (ref 3–10)
NEUTS SEG # BLD: 4.8 K/UL (ref 1.8–8)
NEUTS SEG NFR BLD: 69 % (ref 40–73)
O2/TOTAL GAS SETTING VFR VENT: 21 %
P-R INTERVAL, ECG05: 134 MS
PCO2 BLD: 37.5 MMHG (ref 35–45)
PH BLD: 7.44 [PH] (ref 7.35–7.45)
PH BLDV: 7.43 [PH] (ref 7.32–7.42)
PLATELET # BLD AUTO: 211 K/UL (ref 135–420)
PMV BLD AUTO: 10.4 FL (ref 9.2–11.8)
PO2 BLD: 74 MMHG (ref 80–100)
POTASSIUM SERPL-SCNC: 3.7 MMOL/L (ref 3.5–5.5)
Q-T INTERVAL, ECG07: 378 MS
QRS DURATION, ECG06: 74 MS
QTC CALCULATION (BEZET), ECG08: 459 MS
RBC # BLD AUTO: 4.81 M/UL (ref 4.2–5.3)
SAO2 % BLD: 95 % (ref 92–97)
SERVICE CMNT-IMP: ABNORMAL
SODIUM SERPL-SCNC: 142 MMOL/L (ref 136–145)
SPECIMEN TYPE: ABNORMAL
TOTAL RESP. RATE, ITRR: 20
TROPONIN I SERPL-MCNC: <0.02 NG/ML (ref 0–0.04)
TROPONIN I SERPL-MCNC: <0.02 NG/ML (ref 0–0.04)
VENTRICULAR RATE, ECG03: 89 BPM
WBC # BLD AUTO: 6.9 K/UL (ref 4.6–13.2)

## 2017-12-19 PROCEDURE — 74011000250 HC RX REV CODE- 250: Performed by: EMERGENCY MEDICINE

## 2017-12-19 PROCEDURE — 80048 BASIC METABOLIC PNL TOTAL CA: CPT | Performed by: PHYSICIAN ASSISTANT

## 2017-12-19 PROCEDURE — 93005 ELECTROCARDIOGRAM TRACING: CPT

## 2017-12-19 PROCEDURE — 74011250637 HC RX REV CODE- 250/637: Performed by: EMERGENCY MEDICINE

## 2017-12-19 PROCEDURE — 71260 CT THORAX DX C+: CPT

## 2017-12-19 PROCEDURE — 74011250637 HC RX REV CODE- 250/637: Performed by: INTERNAL MEDICINE

## 2017-12-19 PROCEDURE — 74011636637 HC RX REV CODE- 636/637: Performed by: EMERGENCY MEDICINE

## 2017-12-19 PROCEDURE — 71010 XR CHEST PORT: CPT

## 2017-12-19 PROCEDURE — 82550 ASSAY OF CK (CPK): CPT | Performed by: EMERGENCY MEDICINE

## 2017-12-19 PROCEDURE — 65660000000 HC RM CCU STEPDOWN

## 2017-12-19 PROCEDURE — 82803 BLOOD GASES ANY COMBINATION: CPT

## 2017-12-19 PROCEDURE — 36415 COLL VENOUS BLD VENIPUNCTURE: CPT | Performed by: INTERNAL MEDICINE

## 2017-12-19 PROCEDURE — 99285 EMERGENCY DEPT VISIT HI MDM: CPT

## 2017-12-19 PROCEDURE — 74011636320 HC RX REV CODE- 636/320: Performed by: EMERGENCY MEDICINE

## 2017-12-19 PROCEDURE — 82800 BLOOD PH: CPT | Performed by: EMERGENCY MEDICINE

## 2017-12-19 PROCEDURE — 85379 FIBRIN DEGRADATION QUANT: CPT | Performed by: EMERGENCY MEDICINE

## 2017-12-19 PROCEDURE — 94640 AIRWAY INHALATION TREATMENT: CPT

## 2017-12-19 PROCEDURE — 36600 WITHDRAWAL OF ARTERIAL BLOOD: CPT

## 2017-12-19 PROCEDURE — 85025 COMPLETE CBC W/AUTO DIFF WBC: CPT | Performed by: PHYSICIAN ASSISTANT

## 2017-12-19 PROCEDURE — 77030029684 HC NEB SM VOL KT MONA -A

## 2017-12-19 PROCEDURE — 83880 ASSAY OF NATRIURETIC PEPTIDE: CPT | Performed by: EMERGENCY MEDICINE

## 2017-12-19 PROCEDURE — 74011250636 HC RX REV CODE- 250/636: Performed by: INTERNAL MEDICINE

## 2017-12-19 RX ORDER — FAMOTIDINE 20 MG/1
20 TABLET, FILM COATED ORAL DAILY
Status: DISCONTINUED | OUTPATIENT
Start: 2017-12-20 | End: 2017-12-21 | Stop reason: HOSPADM

## 2017-12-19 RX ORDER — MONTELUKAST SODIUM 10 MG/1
10 TABLET ORAL
Status: DISCONTINUED | OUTPATIENT
Start: 2017-12-19 | End: 2017-12-21 | Stop reason: HOSPADM

## 2017-12-19 RX ORDER — IPRATROPIUM BROMIDE AND ALBUTEROL SULFATE 2.5; .5 MG/3ML; MG/3ML
3 SOLUTION RESPIRATORY (INHALATION) ONCE
Status: COMPLETED | OUTPATIENT
Start: 2017-12-19 | End: 2017-12-19

## 2017-12-19 RX ORDER — HYDRALAZINE HYDROCHLORIDE 50 MG/1
50 TABLET, FILM COATED ORAL 3 TIMES DAILY
Status: DISCONTINUED | OUTPATIENT
Start: 2017-12-19 | End: 2017-12-21 | Stop reason: HOSPADM

## 2017-12-19 RX ORDER — HEPARIN SODIUM 5000 [USP'U]/ML
5000 INJECTION, SOLUTION INTRAVENOUS; SUBCUTANEOUS EVERY 8 HOURS
Status: DISCONTINUED | OUTPATIENT
Start: 2017-12-19 | End: 2017-12-21 | Stop reason: HOSPADM

## 2017-12-19 RX ORDER — ACETAMINOPHEN 500 MG
1000 TABLET ORAL
Status: ACTIVE | OUTPATIENT
Start: 2017-12-19 | End: 2017-12-19

## 2017-12-19 RX ORDER — LEVOFLOXACIN 5 MG/ML
750 INJECTION, SOLUTION INTRAVENOUS EVERY 24 HOURS
Status: DISCONTINUED | OUTPATIENT
Start: 2017-12-19 | End: 2017-12-21 | Stop reason: HOSPADM

## 2017-12-19 RX ORDER — FLUOXETINE HYDROCHLORIDE 20 MG/1
60 CAPSULE ORAL DAILY
Status: DISCONTINUED | OUTPATIENT
Start: 2017-12-20 | End: 2017-12-21 | Stop reason: HOSPADM

## 2017-12-19 RX ORDER — ALBUTEROL SULFATE 0.83 MG/ML
2.5 SOLUTION RESPIRATORY (INHALATION)
Status: DISCONTINUED | OUTPATIENT
Start: 2017-12-19 | End: 2017-12-21 | Stop reason: HOSPADM

## 2017-12-19 RX ORDER — BUMETANIDE 1 MG/1
0.5 TABLET ORAL
Status: DISCONTINUED | OUTPATIENT
Start: 2017-12-19 | End: 2017-12-21 | Stop reason: HOSPADM

## 2017-12-19 RX ORDER — IPRATROPIUM BROMIDE AND ALBUTEROL SULFATE 2.5; .5 MG/3ML; MG/3ML
3 SOLUTION RESPIRATORY (INHALATION)
Status: DISCONTINUED | OUTPATIENT
Start: 2017-12-19 | End: 2017-12-21 | Stop reason: HOSPADM

## 2017-12-19 RX ORDER — ALBUTEROL SULFATE 0.83 MG/ML
2.5 SOLUTION RESPIRATORY (INHALATION)
Status: COMPLETED | OUTPATIENT
Start: 2017-12-19 | End: 2017-12-19

## 2017-12-19 RX ORDER — AMLODIPINE BESYLATE 10 MG/1
10 TABLET ORAL DAILY
Status: DISCONTINUED | OUTPATIENT
Start: 2017-12-20 | End: 2017-12-21 | Stop reason: HOSPADM

## 2017-12-19 RX ORDER — OXYCODONE AND ACETAMINOPHEN 5; 325 MG/1; MG/1
1 TABLET ORAL
Status: COMPLETED | OUTPATIENT
Start: 2017-12-19 | End: 2017-12-19

## 2017-12-19 RX ORDER — SODIUM CHLORIDE 0.9 % (FLUSH) 0.9 %
5-10 SYRINGE (ML) INJECTION AS NEEDED
Status: DISCONTINUED | OUTPATIENT
Start: 2017-12-19 | End: 2017-12-21 | Stop reason: HOSPADM

## 2017-12-19 RX ORDER — BUDESONIDE AND FORMOTEROL FUMARATE DIHYDRATE 160; 4.5 UG/1; UG/1
2 AEROSOL RESPIRATORY (INHALATION) 2 TIMES DAILY
Status: DISCONTINUED | OUTPATIENT
Start: 2017-12-19 | End: 2017-12-20

## 2017-12-19 RX ORDER — OXYCODONE AND ACETAMINOPHEN 10; 325 MG/1; MG/1
1 TABLET ORAL
Status: DISCONTINUED | OUTPATIENT
Start: 2017-12-19 | End: 2017-12-20

## 2017-12-19 RX ORDER — ALPRAZOLAM 1 MG/1
1 TABLET ORAL 2 TIMES DAILY
Status: DISCONTINUED | OUTPATIENT
Start: 2017-12-19 | End: 2017-12-20

## 2017-12-19 RX ORDER — SODIUM CHLORIDE 0.9 % (FLUSH) 0.9 %
5-10 SYRINGE (ML) INJECTION EVERY 8 HOURS
Status: DISCONTINUED | OUTPATIENT
Start: 2017-12-19 | End: 2017-12-21 | Stop reason: HOSPADM

## 2017-12-19 RX ORDER — ALBUTEROL SULFATE 2.5 MG/.5ML
2.5 SOLUTION RESPIRATORY (INHALATION)
Status: DISCONTINUED | OUTPATIENT
Start: 2017-12-19 | End: 2017-12-19

## 2017-12-19 RX ADMIN — Medication 10 ML: at 17:52

## 2017-12-19 RX ADMIN — ALBUTEROL SULFATE 2.5 MG: 2.5 SOLUTION RESPIRATORY (INHALATION) at 13:22

## 2017-12-19 RX ADMIN — IPRATROPIUM BROMIDE AND ALBUTEROL SULFATE 3 ML: .5; 3 SOLUTION RESPIRATORY (INHALATION) at 10:58

## 2017-12-19 RX ADMIN — OXYCODONE HYDROCHLORIDE AND ACETAMINOPHEN 1 TABLET: 5; 325 TABLET ORAL at 13:22

## 2017-12-19 RX ADMIN — ALPRAZOLAM 1 MG: 1 TABLET ORAL at 17:06

## 2017-12-19 RX ADMIN — IPRATROPIUM BROMIDE AND ALBUTEROL SULFATE 3 ML: .5; 3 SOLUTION RESPIRATORY (INHALATION) at 10:30

## 2017-12-19 RX ADMIN — OXYCODONE HYDROCHLORIDE AND ACETAMINOPHEN 1 TABLET: 10; 325 TABLET ORAL at 17:06

## 2017-12-19 RX ADMIN — PREDNISONE 50 MG: 20 TABLET ORAL at 10:41

## 2017-12-19 RX ADMIN — BUMETANIDE 0.5 MG: 1 TABLET ORAL at 17:06

## 2017-12-19 RX ADMIN — PREDNISONE 50 MG: 20 TABLET ORAL at 10:27

## 2017-12-19 RX ADMIN — IOPAMIDOL 100 ML: 612 INJECTION, SOLUTION INTRAVENOUS at 15:26

## 2017-12-19 RX ADMIN — METHYLPREDNISOLONE SODIUM SUCCINATE 40 MG: 40 INJECTION, POWDER, FOR SOLUTION INTRAMUSCULAR; INTRAVENOUS at 17:07

## 2017-12-19 RX ADMIN — IPRATROPIUM BROMIDE AND ALBUTEROL SULFATE 3 ML: .5; 3 SOLUTION RESPIRATORY (INHALATION) at 10:45

## 2017-12-19 NOTE — IP AVS SNAPSHOT
[address] Patient: Candy Berkowitz MRN: [id number] :1965 My Medications STOP taking these medications   
 doxycycline 100 mg capsule Commonly known as:  Trent Fast TAKE these medications as instructed Instructions Each Dose to Equal  
 Morning Noon Evening Bedtime * albuterol 2.5 mg /3 mL (0.083 %) nebulizer solution Commonly known as:  PROVENTIL VENTOLIN Your last dose was: Your next dose is:    
   
   
 3 mL by Nebulization route every four (4) hours as needed for Wheezing or Shortness of Breath. 2.5 mg  
    
   
   
   
  
 * albuterol 90 mcg/actuation inhaler Commonly known as:  PROVENTIL HFA, VENTOLIN HFA, PROAIR HFA Your last dose was: Your next dose is: Take 1 Puff by inhalation every four (4) hours as needed for Wheezing. 1 Puff ALPRAZolam 1 mg tablet Commonly known as:  Myrna Germain Your last dose was: Your next dose is: Take 1 Tab by mouth two (2) times a day. Max Daily Amount: 2 mg.  
 1 mg  
    
   
   
   
  
 amLODIPine 10 mg tablet Commonly known as:  Lorena Zarate Your last dose was: Your next dose is: Take 1 Tab by mouth daily. 10 mg  
    
   
   
   
  
 arformoterol 15 mcg/2 mL Nebu neb solution Commonly known as:  Ary Certain Your last dose was: Your next dose is:    
   
   
 2 mL by Nebulization route two (2) times a day. 15 mcg  
    
   
   
   
  
 budesonide 0.5 mg/2 mL Nbsp Commonly known as:  PULMICORT Your last dose was: Your next dose is:    
   
   
 2 mL by Nebulization route two (2) times a day. 500 mcg  
    
   
   
   
  
 budesonide-formoterol 160-4.5 mcg/actuation Hfaa Commonly known as:  SYMBICORT Your last dose was: Your next dose is: Take 2 Puffs by inhalation two (2) times a day. 2 Puff  
    
   
   
   
  
 bumetanide 0.5 mg tablet Commonly known as:  Dorothfelicia Simmons Your last dose was: Your next dose is: Take 1 Tab by mouth every fourty-eight (48) hours. 0.5 mg  
    
   
   
   
  
 famotidine 20 mg tablet Commonly known as:  PEPCID Your last dose was: Your next dose is: Take 1 Tab by mouth daily. 20 mg FLUoxetine 20 mg capsule Commonly known as:  PROzac Your last dose was: Your next dose is: Take 3 Caps by mouth daily. 60 mg  
    
   
   
   
  
 hydrALAZINE 50 mg tablet Commonly known as:  APRESOLINE Your last dose was: Your next dose is: Take 1 Tab by mouth three (3) times daily. 50 mg  
    
   
   
   
  
 montelukast 10 mg tablet Commonly known as:  SINGULAIR Your last dose was: Your next dose is: Take 1 Tab by mouth nightly. 10 mg  
    
   
   
   
  
 oxyCODONE-acetaminophen  mg per tablet Commonly known as:  PERCOCET 10 Your last dose was: Your next dose is: Take 1 Tab by mouth every eight (8) hours as needed. Max Daily Amount: 3 Tabs. 1 Tab  
    
   
   
   
  
 predniSONE 10 mg tablet Commonly known as:  Aziza Olivas Your last dose was: Your next dose is: Take 1 Tab by mouth daily (with breakfast). Take 4 tablets by mouth for 2 days, then 3 tabs daily for 2 days, then 2 tabs daily for 2 days, then 1 tab daily for 2 days, then STOP. 10 mg  
    
   
   
   
  
 tiotropium 18 mcg inhalation capsule Commonly known as:  Ashley Lopez Your last dose was: Your next dose is: Take 1 Cap by inhalation daily. 1 Cap * Notice:   This list has 2 medication(s) that are the same as other medications prescribed for you. Read the directions carefully, and ask your doctor or other care provider to review them with you. Where to Get Your Medications Information on where to get these meds will be given to you by the nurse or doctor. ! Ask your nurse or doctor about these medications  
  predniSONE 10 mg tablet

## 2017-12-19 NOTE — Clinical Note
Status[de-identified] Inpatient [101] Type of Bed: Telemetry [19] Inpatient Hospitalization Certified Necessary for the Following Reasons: 3. Patient receiving treatment that can only be provided in an inpatient setting (further clarification in H&P documentation) Admitting Diagnosis: Asthma exacerbation [1180634] Admitting Physician: Mike Caldwell [[id number]] Attending Physician: Mike Caldwell [[id number]] Estimated Length of Stay: 2 Midnights Discharge Plan[de-identified] Home with Office Follow-up

## 2017-12-19 NOTE — ED TRIAGE NOTES
EMS reports last week pt Dx with pneumonia.  This am wheezing; O2 sat's 97 % on RA; given duoneb; SBP  189/99

## 2017-12-19 NOTE — ED NOTES
TRANSFER - OUT REPORT:    Verbal report given to Fabiana Ball RN(name) on Tiera Red  being transferred to [address] [place] [address](unit) for routine progression of care       Report consisted of patients Situation, Background, Assessment and   Recommendations(SBAR). Information from the following report(s) SBAR, MAR and Recent Results was reviewed with the receiving nurse. Lines:   PICC Double Lumen 03/45/41 Right;Basilic (Active)       Peripheral IV 12/19/17 Left Antecubital (Active)   Site Assessment Clean, dry, & intact 12/19/2017  3:23 PM   Phlebitis Assessment 0 12/19/2017  3:23 PM   Infiltration Assessment 0 12/19/2017  3:23 PM   Dressing Status Clean, dry, & intact 12/19/2017  3:23 PM   Dressing Type Transparent 12/19/2017  3:23 PM   Hub Color/Line Status Pink 12/19/2017  3:23 PM   Action Taken Blood drawn 12/19/2017  3:23 PM        Opportunity for questions and clarification was provided.       Patient transported with:   [employer]

## 2017-12-19 NOTE — IP AVS SNAPSHOT
[address] [address] Damien Sidhu Patient: David Santana MRN: [id number] :1965 About your hospitalization You were admitted on:  2017 You last received care in the:  [address] [place] [address] You were discharged on:  2017 Why you were hospitalized Your primary diagnosis was:  Copd (Chronic Obstructive Pulmonary Disease) With Chronic Bronchitis (Hcc) Your diagnoses also included:  Asthma Exacerbation, Asthma Exacerbation Attacks, Hypertensive Heart Failure (Hcc), Sleep Apnea, Acute Respiratory Failure With Hypercapnia (Hcc) Things You Need To Do (next 8 weeks) Friday Dec 29, 2017 New Patient with Jeanell Holter, MD at 10:00 AM  
Where:  [facility] ([address] [facility]) Discharge Orders None A check donaldo indicates which time of day the medication should be taken. My Medications STOP taking these medications   
 doxycycline 100 mg capsule Commonly known as:  Akshat Velázquez TAKE these medications as instructed Instructions Each Dose to Equal  
 Morning Noon Evening Bedtime * albuterol 2.5 mg /3 mL (0.083 %) nebulizer solution Commonly known as:  PROVENTIL VENTOLIN Your last dose was: Your next dose is:    
   
   
 3 mL by Nebulization route every four (4) hours as needed for Wheezing or Shortness of Breath. 2.5 mg  
    
   
   
   
  
 * albuterol 90 mcg/actuation inhaler Commonly known as:  PROVENTIL HFA, VENTOLIN HFA, PROAIR HFA Your last dose was: Your next dose is: Take 1 Puff by inhalation every four (4) hours as needed for Wheezing. 1 Puff ALPRAZolam 1 mg tablet Commonly known as:  Jeaneth Galvan Your last dose was: Your next dose is: Take 1 Tab by mouth two (2) times a day. Max Daily Amount: 2 mg.  
 1 mg amLODIPine 10 mg tablet Commonly known as:  Radha Soares Your last dose was: Your next dose is: Take 1 Tab by mouth daily. 10 mg  
    
   
   
   
  
 arformoterol 15 mcg/2 mL Nebu neb solution Commonly known as:  Rutroseanne Walker Your last dose was: Your next dose is:    
   
   
 2 mL by Nebulization route two (2) times a day. 15 mcg  
    
   
   
   
  
 budesonide 0.5 mg/2 mL Nbsp Commonly known as:  PULMICORT Your last dose was: Your next dose is:    
   
   
 2 mL by Nebulization route two (2) times a day. 500 mcg  
    
   
   
   
  
 budesonide-formoterol 160-4.5 mcg/actuation Hfaa Commonly known as:  SYMBICORT Your last dose was: Your next dose is: Take 2 Puffs by inhalation two (2) times a day. 2 Puff  
    
   
   
   
  
 bumetanide 0.5 mg tablet Commonly known as:  Willeneris Fabrizio Your last dose was: Your next dose is: Take 1 Tab by mouth every fourty-eight (48) hours. 0.5 mg  
    
   
   
   
  
 famotidine 20 mg tablet Commonly known as:  PEPCID Your last dose was: Your next dose is: Take 1 Tab by mouth daily. 20 mg FLUoxetine 20 mg capsule Commonly known as:  PROzac Your last dose was: Your next dose is: Take 3 Caps by mouth daily. 60 mg  
    
   
   
   
  
 hydrALAZINE 50 mg tablet Commonly known as:  APRESOLINE Your last dose was: Your next dose is: Take 1 Tab by mouth three (3) times daily. 50 mg  
    
   
   
   
  
 montelukast 10 mg tablet Commonly known as:  SINGULAIR Your last dose was: Your next dose is: Take 1 Tab by mouth nightly. 10 mg  
    
   
   
   
  
 oxyCODONE-acetaminophen  mg per tablet Commonly known as:  PERCOCET 10 Your last dose was: Your next dose is: Take 1 Tab by mouth every eight (8) hours as needed. Max Daily Amount: 3 Tabs. 1 Tab  
    
   
   
   
  
 predniSONE 10 mg tablet Commonly known as:  Santana Lucero Your last dose was: Your next dose is: Take 1 Tab by mouth daily (with breakfast). Take 4 tablets by mouth for 2 days, then 3 tabs daily for 2 days, then 2 tabs daily for 2 days, then 1 tab daily for 2 days, then STOP. 10 mg  
    
   
   
   
  
 tiotropium 18 mcg inhalation capsule Commonly known as:  Arnulfo Berry Your last dose was: Your next dose is: Take 1 Cap by inhalation daily. 1 Cap * Notice: This list has 2 medication(s) that are the same as other medications prescribed for you. Read the directions carefully, and ask your doctor or other care provider to review them with you. Where to Get Your Medications Information on where to get these meds will be given to you by the nurse or doctor. ! Ask your nurse or doctor about these medications  
  predniSONE 10 mg tablet Discharge Instructions None [employer] Announcement We are excited to announce that we are making your provider's discharge notes available to you in [employer]. You will see these notes when they are completed and signed by the physician that discharged you from your recent hospital stay. If you have any questions or concerns about any information you see in [employer], please call the Health Information Department where you were seen or reach out to your Primary Care Provider for more information about your plan of care. Introducing [facility]! Emma Quintanilla introduces [employer] patient portal. Now you can access parts of your medical record, email your doctor's office, and request medication refills online. 1. In your internet browser, go to [email or web address] 2. Click on the First Time User? Click Here link in the Sign In box. You will see the New Member Sign Up page. 3. Enter your [facility] Access Code exactly as it appears below. You will not need to use this code after youve completed the sign-up process. If you do not sign up before the expiration date, you must request a new code. · [facility] Access Code: [email or web address] Expires: 3/21/2018 12:22 PM 
 
4. Enter the last four digits of your Social Security Number (xxxx) and Date of Birth (mm/dd/yyyy) as indicated and click Submit. You will be taken to the next sign-up page. 5. Create a [facility] ID. This will be your [facility] login ID and cannot be changed, so think of one that is secure and easy to remember. 6. Create a [facility] password. You can change your password at any time. 7. Enter your Password Reset Question and Answer. This can be used at a later time if you forget your password. 8. Enter your e-mail address. You will receive e-mail notification when new information is available in [address] 9. Click Sign Up. You can now view and download portions of your medical record. 10. Click the Download Summary menu link to download a portable copy of your medical information. If you have questions, please visit the Frequently Asked Questions section of the [facility] [employer] website. Remember, [facility] is NOT to be used for urgent needs. For medical emergencies, dial 911. Now available from your iPhone and Android! Providers Seen During Your Hospitalization Provider Specialty Primary office phone Angelica Mackenzie MD Emergency Medicine [phone] Lainey Downing MD Internal Medicine [phone] Your Primary Care Physician (PCP) Primary Care Physician Office Phone Office Fax OTHER, PHYS ** None ** ** None ** You are allergic to the following No active allergies Recent Documentation Height Weight BMI Smoking Status 1.499 m 93.9 kg 41.81 kg/m2 Never Smoker Emergency Contacts Name Discharge Info Relation Home Work Mobile Monica Denis DISCHARGE CAREGIVER [3] Daughter [21] [phone] Mosie Mediate  Daughter [21] [phone] Larissa Charles [5] [phone] Patient Belongings The following personal items are in your possession at time of discharge: 
     Visual Aid: None      Home Medications: None      Clothing: Socks, Shirt, Pants, With patient, At bedside    Other Valuables: Cell Phone, At bedside, With patient (flip phone) Please provide this summary of care documentation to your next provider. Signatures-by signing, you are acknowledging that this After Visit Summary has been reviewed with you and you have received a copy. Patient Signature:  ____________________________________________________________ Date:  ____________________________________________________________  
  
[facility] Provider Signature:  ____________________________________________________________ Date:  ____________________________________________________________

## 2017-12-19 NOTE — H&P
History & Physical    Patient: Cara Kat MRN: [id number]  CSN: [id number]    YOB: 1965  Age: 47 y.o. Sex: female      DOA: 12/19/2017    Chief Complaint:   Chief Complaint   Patient presents with    Cough    Wheezing          HPI:     Cara Kat is a 47 y.o.  female who has hx of of Asthma /Copd/heartfailure   Recently discharged from [facility] with Pneumonia   Patient states on day of discharge was not feeling well   Picked up meds and noticed worsening shortness of  Breath with minimal exertion   Cough with sputum production   Called ambulance to pick her up due to dyspnea   Has sleep apnea not using machine in ER given Solumedrol 3 nebulizer with no improvement  Noted dyspnea with exertion     Past Medical History:   Diagnosis Date    Asthma     CHF [facility]congestive heart failure) ([facility])     Chronic obstructive pulmonary disease ([address]     Endocrine disease     thyroid issues    Gastrointestinal disorder     \"blockage in my stomach\"    Hypertension        History reviewed. No pertinent surgical history. History reviewed. No pertinent family history. Social History     Social History    Marital status:      Spouse name: N/A    Number of children: N/A    Years of education: N/A     Social History Main Topics    Smoking status: Never Smoker    Smokeless tobacco: Never Used    Alcohol use No      Comment: socially    Drug use: No    Sexual activity: No      Comment: last used 9 years ago     Other Topics Concern    None     Social History Narrative       Prior to Admission medications    Medication Sig Start Date End Date Taking? Authorizing Provider   ALPRAZolam Valerie Muro) 1 mg tablet Take 1 Tab by mouth two (2) times a day. Max Daily Amount: 2 mg. 4/24/17   Sherry Dudley MD   oxyCODONE-acetaminophen (PERCOCET 10)  mg per tablet Take 1 Tab by mouth every eight (8) hours as needed. Max Daily Amount: 3 Tabs.  4/24/17   Sherry Dudley MD predniSONE (DELTASONE) 20 mg tablet Take 2 Tabs by mouth daily (with breakfast). 4/25/17   Bobby Bella MD   doxycycline (MONODOX) 100 mg capsule Take 1 Cap by mouth two (2) times a day. 4/24/17   Bobby Bella MD   albuterol (PROVENTIL HFA, VENTOLIN HFA, PROAIR HFA) 90 mcg/actuation inhaler Take 1 Puff by inhalation every four (4) hours as needed for Wheezing. 4/9/17   Jamie Arcos MD   albuterol (PROVENTIL VENTOLIN) 2.5 mg /3 mL (0.083 %) nebulizer solution 3 mL by Nebulization route every four (4) hours as needed for Wheezing or Shortness of Breath. 3/12/17   Tamika Toribio MD   amLODIPine (NORVASC) 10 mg tablet Take 1 Tab by mouth daily. 3/9/17   Radha Stoner MD   budesonide-formoterol [facility]) 160-4.5 mcg/actuation HFA inhaler Take 2 Puffs by inhalation two (2) times a day. 3/9/17   Radha Stoner MD   bumetanide (BUMEX) 0.5 mg tablet Take 1 Tab by mouth every fourty-eight (48) hours. 3/9/17   Radha Stoner MD   famotidine (PEPCID) 20 mg tablet Take 1 Tab by mouth daily. 3/9/17   Radha Stoner MD   FLUoxetine (PROZAC) 20 mg capsule Take 3 Caps by mouth daily. 3/9/17   Radha Stoner MD   montelukast (SINGULAIR) 10 mg tablet Take 1 Tab by mouth nightly. 3/9/17   Radha Stoner MD   tiotropium [facility]) 18 mcg inhalation capsule Take 1 Cap by inhalation daily. 3/9/17   Radha Stoner MD   arformoterol (BROVANA) 15 mcg/2 mL nebu neb solution 2 mL by Nebulization route two (2) times a day. 3/9/17   Radha Stoner MD   budesonide (PULMICORT) 0.5 mg/2 mL nbsp 2 mL by Nebulization route two (2) times a day. 3/9/17   Radha Stoner MD   hydrALAZINE (APRESOLINE) 50 mg tablet Take 1 Tab by mouth three (3) times daily. 3/9/17   Radha Stoner MD       No Known Allergies      Review of Systems  GENERAL: Patient alert, awake and oriented times 3, able to communicate full sentences and not in distress. HEENT: No change in vision, no earache, tinnitus, sore throat or sinus congestion. NECK: No pain or stiffness. PULMONARY: + shortness of breath+, cough +heeze. Cardiovascular: no pnd or orthopnea,+ CP with inspiration   GASTROINTESTINAL: No abdominal pain, nausea, vomiting or diarrhea, melena or bright red blood per rectum. GENITOURINARY: No urinary frequency, urgency, hesitancy or dysuria. MUSCULOSKELETAL: leg pain  joint or muscle pain, no back pain, no recent trauma. DERMATOLOGIC: No rash, no itching, no lesions. ENDOCRINE: No polyuria, polydipsia, no heat or cold intolerance. No recent change in weight. HEMATOLOGICAL: No anemia or easy bruising or bleeding. NEUROLOGIC:  headache, seizures+, numbness, tingling or weakness. Physical Exam:     Physical Exam:  Visit Vitals    /86 (BP 1 Location: Left arm, BP Patient Position: At rest;Sitting)    Pulse 87    Temp 97.1 °F (36.2 °C)    Resp 18    Ht 4' 11\" (1.499 m)    Wt 90.7 kg (200 lb)    SpO2 96%    BMI 40.4 kg/m2      O2 Device: Room air    Temp (24hrs), Av.1 °F (36.2 °C), Min:97.1 °F (36.2 °C), Max:97.1 °F (36.2 °C)             General:  Alert, cooperative, no distress, appears stated age. Head: Normocephalic, without obvious abnormality, atraumatic. Eyes:  Conjunctivae/corneas clear. PERRL, EOMs intact. Nose: Nares normal. No drainage or sinus tenderness. Neck: Supple, symmetrical, trachea midline, no adenopathy, thyroid: no enlargement, no carotid bruit and no JVD. Lungs:   Clear to auscultation bilaterally. diminshed with exp wheeze      Heart:  Regular rate and rhythm, S1, S2 normal.tachycardia     Abdomen: Soft, non-tender. Bowel sounds normal.    Extremities: Extremities normal, atraumatic, no cyanosis or edema. Pulses: 2+ and symmetric all extremities. Skin:  No rashes or lesions   Neurologic: AAOx3, No focal motor or sensory deficit. Labs Reviewed: All lab results for the last 24 hours reviewed.   Recent Results (from the past 12 hour(s))   CBC WITH AUTOMATED DIFF    Collection Time: 17 8:45 AM   Result Value Ref Range    WBC 6.9 4.6 - 13.2 K/uL    RBC 4.81 4.20 - 5.30 M/uL    HGB 14.1 12.0 - 16.0 g/dL    HCT 42.3 35.0 - 45.0 %    MCV 87.9 74.0 - 97.0 FL    MCH 29.3 24.0 - 34.0 PG    MCHC 33.3 31.0 - 37.0 g/dL    RDW 13.7 11.6 - 14.5 %    PLATELET 909 747 - 509 K/uL    MPV 10.4 9.2 - 11.8 FL    NEUTROPHILS 69 40 - 73 %    LYMPHOCYTES 24 21 - 52 %    MONOCYTES 5 3 - 10 %    EOSINOPHILS 2 0 - 5 %    BASOPHILS 0 0 - 2 %    ABS. NEUTROPHILS 4.8 1.8 - 8.0 K/UL    ABS. LYMPHOCYTES 1.7 0.9 - 3.6 K/UL    ABS. MONOCYTES 0.3 0.05 - 1.2 K/UL    ABS. EOSINOPHILS 0.2 0.0 - 0.4 K/UL    ABS.  BASOPHILS 0.0 0.0 - 0.1 K/UL    DF AUTOMATED     METABOLIC PANEL, BASIC    Collection Time: 12/19/17  8:45 AM   Result Value Ref Range    Sodium 142 136 - 145 mmol/L    Potassium 3.7 3.5 - 5.5 mmol/L    Chloride 107 100 - 108 mmol/L    CO2 30 21 - 32 mmol/L    Anion gap 5 3.0 - 18 mmol/L    Glucose 87 74 - 99 mg/dL    BUN 6 (L) 7.0 - 18 MG/DL    Creatinine 0.88 0.6 - 1.3 MG/DL    BUN/Creatinine ratio 7 (L) 12 - 20      GFR est AA >60 >60 ml/min/1.73m2    GFR est non-AA >60 >60 ml/min/1.73m2    Calcium 8.8 8.5 - 10.1 MG/DL   D DIMER    Collection Time: 12/19/17  8:45 AM   Result Value Ref Range    D DIMER 0.41 <0.46 ug/ml(FEU)   NT-PRO BNP    Collection Time: 12/19/17  8:45 AM   Result Value Ref Range    NT pro- 0 - 900 PG/ML   CARDIAC PANEL,(CK, CKMB & TROPONIN)    Collection Time: 12/19/17  8:45 AM   Result Value Ref Range    CK 52 26 - 192 U/L    CK - MB 1.7 <3.6 ng/ml    CK-MB Index 3.3 0.0 - 4.0 %    Troponin-I, Qt. <0.02 0.0 - 0.045 NG/ML   EKG, 12 LEAD, INITIAL    Collection Time: 12/19/17 12:45 PM   Result Value Ref Range    Ventricular Rate 89 BPM    Atrial Rate 89 BPM    P-R Interval 134 ms    QRS Duration 74 ms    Q-T Interval 378 ms    QTC Calculation (Bezet) 459 ms    Calculated P Axis 54 degrees    Calculated R Axis 55 degrees    Calculated T Axis 60 degrees    Diagnosis       Normal sinus rhythm  Nonspecific T wave abnormality  Abnormal ECG  When compared with ECG of 18-APR-2017 19:00,  Nonspecific T wave abnormality now evident in Inferior leads  Nonspecific T wave abnormality now evident in Lateral leads     PH, VENOUS    Collection Time: 12/19/17  1:45 PM   Result Value Ref Range    VENOUS PH 7.43 (H) 7.32 - 7.42        and EKG      Assessment/Plan     Principal Problem:    COPD (chronic obstructive pulmonary disease) with chronic bronchitis (HCC) (12/19/2017)  Restart Duoneb  RT eval   Solumedrol   Check CT of chest     Active Problems:    Asthma exacerbation attacks (10/19/2015)      Asthma exacerbation (1/26/2017)      Acute respiratory failure with hypercapnia (HCC) (2/26/2017)  Re check ABG     Hypertensive heart failure ([address] (12/19/2017)  Re check echo    Sleep apnea (12/19/2017)  Not using machine arrrange for out patient use      leg pain   Check ultrasound     DVT/GI Prophylaxis: Hep SQ    Discussed with patient at bedside about hospital admission and my plan care, who understood and agree with my plan care.     Aníbal Vaughn MD  12/19/2017 2:50 PM

## 2017-12-19 NOTE — PROGRESS NOTES
Patient has refused to wear telemetry monitoring at this time. Patient had refused to receive heparin, stating that she doesn't want bruising from it. Patient has refused to have blood drawn for cardiac panel. Previous to admission on the unit per Romle Ma in vascular lab stated in report that refused duplex study stating that the patient said it hurts.

## 2017-12-19 NOTE — ED PROVIDER NOTES
EMERGENCY DEPARTMENT HISTORY AND PHYSICAL EXAM    9:13 AM      Date: 12/19/2017  Patient Name: Karla Carpenter    History of Presenting Illness     Chief Complaint   Patient presents with    Cough    Wheezing         History Provided By: Patient    Chief Complaint: COPD \"Flare Up\"  Duration: 1 Weeks  Timing:  Constant  Location: Lungs  Quality: N/A  Severity: Mild  Modifying Factors: Did not report  Associated Symptoms: denies any other associated signs or symptoms      Additional History (Context): Karla Carpenter is a 47 y.o. female with hypertension, COPD, asthma and endocrine disease, GI disorder, CHF who presents with a constant asthma \"flare up\" that has been occurring for 1 week. Was admitted to [facility] for 4-5 days for PNA. Pt states that her condition did not improve. Finished antibiotics and prednisone about 5 days ago. She uses breathing treatments at home. She is having difficulty ambulating due to severity of symptoms. Did not report any associated symptoms. Did not report any modifying factors. PCP: Shalini Knox MD    Current Outpatient Prescriptions   Medication Sig Dispense Refill    ALPRAZolam (XANAX) 1 mg tablet Take 1 Tab by mouth two (2) times a day. Max Daily Amount: 2 mg. 20 Tab 0    oxyCODONE-acetaminophen (PERCOCET 10)  mg per tablet Take 1 Tab by mouth every eight (8) hours as needed. Max Daily Amount: 3 Tabs. 20 Tab 0    predniSONE (DELTASONE) 20 mg tablet Take 2 Tabs by mouth daily (with breakfast). 3 Tab 0    doxycycline (MONODOX) 100 mg capsule Take 1 Cap by mouth two (2) times a day. 2 Cap 0    albuterol (PROVENTIL HFA, VENTOLIN HFA, PROAIR HFA) 90 mcg/actuation inhaler Take 1 Puff by inhalation every four (4) hours as needed for Wheezing. 1 Inhaler 0    albuterol (PROVENTIL VENTOLIN) 2.5 mg /3 mL (0.083 %) nebulizer solution 3 mL by Nebulization route every four (4) hours as needed for Wheezing or Shortness of Breath.  100 Each 1    amLODIPine (NORVASC) 10 mg tablet Take 1 Tab by mouth daily. 30 Tab 1    budesonide-formoterol (SYMBICORT) 160-4.5 mcg/actuation HFA inhaler Take 2 Puffs by inhalation two (2) times a day. 1 Inhaler 1    bumetanide (BUMEX) 0.5 mg tablet Take 1 Tab by mouth every fourty-eight (48) hours. 30 Tab 1    famotidine (PEPCID) 20 mg tablet Take 1 Tab by mouth daily. 30 Tab 1    FLUoxetine (PROZAC) 20 mg capsule Take 3 Caps by mouth daily. 90 Cap 1    montelukast (SINGULAIR) 10 mg tablet Take 1 Tab by mouth nightly. 30 Tab 1    tiotropium (SPIRIVA) 18 mcg inhalation capsule Take 1 Cap by inhalation daily. 30 Cap 1    arformoterol (BROVANA) 15 mcg/2 mL nebu neb solution 2 mL by Nebulization route two (2) times a day. 60 Vial 1    budesonide (PULMICORT) 0.5 mg/2 mL nbsp 2 mL by Nebulization route two (2) times a day. 60 Each 1    hydrALAZINE (APRESOLINE) 50 mg tablet Take 1 Tab by mouth three (3) times daily. 80 Tab 1       Past History     Past Medical History:  Past Medical History:   Diagnosis Date    Asthma     CHF (congestive heart failure) (HCC)     Chronic obstructive pulmonary disease (HCC)     Endocrine disease     thyroid issues    Gastrointestinal disorder     \"blockage in my stomach\"    Hypertension        Past Surgical History:  History reviewed. No pertinent surgical history. Family History:  History reviewed. No pertinent family history. Social History:  Social History   Substance Use Topics    Smoking status: Never Smoker    Smokeless tobacco: Never Used    Alcohol use No      Comment: socially       Allergies:  No Known Allergies      Review of Systems       Review of Systems   Constitutional: Negative for chills, fatigue and fever. HENT: Negative for congestion, ear pain, rhinorrhea and sore throat. Eyes: Negative for pain, redness and itching. Respiratory: Negative for cough, chest tightness and shortness of breath.          COPD   Cardiovascular: Negative for chest pain, palpitations and leg swelling. Gastrointestinal: Negative for abdominal pain, diarrhea, nausea and vomiting. Genitourinary: Negative for decreased urine volume, dysuria, flank pain, hematuria and pelvic pain. Musculoskeletal: Negative for arthralgias, back pain, joint swelling and myalgias. Skin: Negative for color change, pallor and rash. Neurological: Negative for dizziness, weakness and headaches. Hematological: Negative for adenopathy. Does not bruise/bleed easily. Physical Exam     Visit Vitals    /86 (BP 1 Location: Left arm, BP Patient Position: At rest;Sitting)    Pulse 87    Temp 97.1 °F (36.2 °C)    Resp 18    Ht 4' 11\" (1.499 m)    Wt 90.7 kg (200 lb)    SpO2 96%    BMI 40.4 kg/m2         Physical Exam   Constitutional: No distress. HENT:   Head: Normocephalic and atraumatic. Mouth/Throat: Oropharynx is clear and moist.   Eyes: Conjunctivae and EOM are normal. Pupils are equal, round, and reactive to light. Neck: Normal range of motion. Neck supple. Cardiovascular: Normal rate, regular rhythm and normal heart sounds. No murmur heard. Pulmonary/Chest: Breath sounds normal. Accessory muscle usage present. Tachypnea noted. No respiratory distress. She has no wheezes. She has no rales. decrease air entry. Diffuse expiratory wheezing. Abdominal: Soft. Bowel sounds are normal. She exhibits no distension. There is no tenderness. Musculoskeletal: Normal range of motion. She exhibits no edema or deformity. Lymphadenopathy:     She has no cervical adenopathy. Neurological: She is alert. She exhibits normal muscle tone. Coordination normal.   Skin: Skin is warm and dry. No rash noted. She is not diaphoretic. No erythema. Psychiatric: She has a normal mood and affect.  Her behavior is normal.         Diagnostic Study Results     Labs -  Recent Results (from the past 12 hour(s))   CBC WITH AUTOMATED DIFF    Collection Time: 12/19/17  8:45 AM   Result Value Ref Range    WBC 6.9 4.6 - 13.2 K/uL    RBC 4.81 4.20 - 5.30 M/uL    HGB 14.1 12.0 - 16.0 g/dL    HCT 42.3 35.0 - 45.0 %    MCV 87.9 74.0 - 97.0 FL    MCH 29.3 24.0 - 34.0 PG    MCHC 33.3 31.0 - 37.0 g/dL    RDW 13.7 11.6 - 14.5 %    PLATELET 044 544 - 964 K/uL    MPV 10.4 9.2 - 11.8 FL    NEUTROPHILS 69 40 - 73 %    LYMPHOCYTES 24 21 - 52 %    MONOCYTES 5 3 - 10 %    EOSINOPHILS 2 0 - 5 %    BASOPHILS 0 0 - 2 %    ABS. NEUTROPHILS 4.8 1.8 - 8.0 K/UL    ABS. LYMPHOCYTES 1.7 0.9 - 3.6 K/UL    ABS. MONOCYTES 0.3 0.05 - 1.2 K/UL    ABS. EOSINOPHILS 0.2 0.0 - 0.4 K/UL    ABS. BASOPHILS 0.0 0.0 - 0.1 K/UL    DF AUTOMATED     METABOLIC PANEL, BASIC    Collection Time: 12/19/17  8:45 AM   Result Value Ref Range    Sodium 142 136 - 145 mmol/L    Potassium 3.7 3.5 - 5.5 mmol/L    Chloride 107 100 - 108 mmol/L    CO2 30 21 - 32 mmol/L    Anion gap 5 3.0 - 18 mmol/L    Glucose 87 74 - 99 mg/dL    BUN 6 (L) 7.0 - 18 MG/DL    Creatinine 0.88 0.6 - 1.3 MG/DL    BUN/Creatinine ratio 7 (L) 12 - 20      GFR est AA >60 >60 ml/min/1.73m2    GFR est non-AA >60 >60 ml/min/1.73m2    Calcium 8.8 8.5 - 10.1 MG/DL       Radiologic Studies -   XR CHEST PORT   Final Result        XR CHEST PORT:    Stable mild cardiomegaly. Medical Decision Making   I am the first provider for this patient. I reviewed the vital signs, available nursing notes, past medical history, past surgical history, family history and social history. Vital Signs-Reviewed the patient's vital signs. Pulse Oximetry Analysis -  96% on room air (Interpretation), nml    Cardiac Monitor:  Rate: 89  Rhythm:  Normal Sinus Rhythm     EKG: Interpreted by the EP. Time Interpreted: 12:48PM   Rate: 89   Rhythm: Normal Sinus Rhythm    Interpretation: No acute ischemia    Records Reviewed: Nursing Notes and Old Medical Records (Time of Review: 9:13 AM)    ED Course: Progress Notes, Reevaluation, and Consults:    MDM: consistent with exacerbation of patient's obstructive lung disease.   Not hypoxic, but still has increased work of breathing, retractions, and tachypnea after nebs and steroids and difficulty ambulating down the hallway. Will require hospital admission for further management. No signs of infection -- no fever, leukocytosis, or infiltrate -- so will withhold abx. Consult:  Discussed care with hospitalist Dr. Saundra Ortiz. Standard discussion; including history of patients chief complaint, available diagnostic results, and treatment course. Accepts admission  1:25 PM, 12/19/2017       For Hospitalized Patients:    1. Hospitalization Decision Time:  The decision to hospitalize the patient was made by Dr. Gurwinder Pickett at 1:27PM on 12/19/2017    2. Aspirin: Aspirin was not given because the patient did not present with a stroke at the time of their Emergency Department evaluation    Diagnosis     Clinical Impression:   1. Intermittent asthma with acute exacerbation, unspecified asthma severity        Disposition: Admit    Follow-up Information     None           Patient's Medications   Start Taking    No medications on file   Continue Taking    ALBUTEROL (PROVENTIL HFA, VENTOLIN HFA, PROAIR HFA) 90 MCG/ACTUATION INHALER    Take 1 Puff by inhalation every four (4) hours as needed for Wheezing. ALBUTEROL (PROVENTIL VENTOLIN) 2.5 MG /3 ML (0.083 %) NEBULIZER SOLUTION    3 mL by Nebulization route every four (4) hours as needed for Wheezing or Shortness of Breath. ALPRAZOLAM (XANAX) 1 MG TABLET    Take 1 Tab by mouth two (2) times a day. Max Daily Amount: 2 mg. AMLODIPINE (NORVASC) 10 MG TABLET    Take 1 Tab by mouth daily. ARFORMOTEROL (BROVANA) 15 MCG/2 ML NEBU NEB SOLUTION    2 mL by Nebulization route two (2) times a day. BUDESONIDE (PULMICORT) 0.5 MG/2 ML NBSP    2 mL by Nebulization route two (2) times a day. BUDESONIDE-FORMOTEROL (SYMBICORT) 160-4.5 MCG/ACTUATION HFA INHALER    Take 2 Puffs by inhalation two (2) times a day.     BUMETANIDE (BUMEX) 0.5 MG TABLET    Take 1 Tab by mouth every fourty-eight (48) hours. DOXYCYCLINE (MONODOX) 100 MG CAPSULE    Take 1 Cap by mouth two (2) times a day. FAMOTIDINE (PEPCID) 20 MG TABLET    Take 1 Tab by mouth daily. FLUOXETINE (PROZAC) 20 MG CAPSULE    Take 3 Caps by mouth daily. HYDRALAZINE (APRESOLINE) 50 MG TABLET    Take 1 Tab by mouth three (3) times daily. MONTELUKAST (SINGULAIR) 10 MG TABLET    Take 1 Tab by mouth nightly. OXYCODONE-ACETAMINOPHEN (PERCOCET 10)  MG PER TABLET    Take 1 Tab by mouth every eight (8) hours as needed. Max Daily Amount: 3 Tabs. PREDNISONE (DELTASONE) 20 MG TABLET    Take 2 Tabs by mouth daily (with breakfast). TIOTROPIUM (SPIRIVA) 18 MCG INHALATION CAPSULE    Take 1 Cap by inhalation daily. These Medications have changed    No medications on file   Stop Taking    No medications on file     _______________________________    Attestations:  Alana Michael acting as a scribe for and in the presence of Maria Victoria Piper MD      December 19, 2017 at 9:13 AM       Provider Attestation:      I personally performed the services described in the documentation, reviewed the documentation, as recorded by the scribe in my presence, and it accurately and completely records my words and actions.  December 19, 2017 at 9:13 AM - Maria Victoria Piper MD    _______________________________

## 2017-12-19 NOTE — PROGRESS NOTES
Problem: Falls - Risk of  Goal: *Absence of Falls  Document Delroy Fall Risk and appropriate interventions in the flowsheet.   Outcome: Progressing Towards Goal  Fall Risk Interventions:

## 2017-12-20 PROCEDURE — 74011250637 HC RX REV CODE- 250/637: Performed by: INTERNAL MEDICINE

## 2017-12-20 PROCEDURE — 74011250636 HC RX REV CODE- 250/636: Performed by: FAMILY MEDICINE

## 2017-12-20 PROCEDURE — 93970 EXTREMITY STUDY: CPT

## 2017-12-20 PROCEDURE — 65660000000 HC RM CCU STEPDOWN

## 2017-12-20 PROCEDURE — 94640 AIRWAY INHALATION TREATMENT: CPT

## 2017-12-20 PROCEDURE — 74011250637 HC RX REV CODE- 250/637: Performed by: FAMILY MEDICINE

## 2017-12-20 PROCEDURE — 74011250636 HC RX REV CODE- 250/636: Performed by: INTERNAL MEDICINE

## 2017-12-20 PROCEDURE — 74011000250 HC RX REV CODE- 250: Performed by: INTERNAL MEDICINE

## 2017-12-20 PROCEDURE — 93306 TTE W/DOPPLER COMPLETE: CPT

## 2017-12-20 RX ORDER — BUDESONIDE AND FORMOTEROL FUMARATE DIHYDRATE 160; 4.5 UG/1; UG/1
2 AEROSOL RESPIRATORY (INHALATION)
Status: DISCONTINUED | OUTPATIENT
Start: 2017-12-20 | End: 2017-12-21 | Stop reason: HOSPADM

## 2017-12-20 RX ORDER — ALPRAZOLAM 1 MG/1
1 TABLET ORAL
Status: DISCONTINUED | OUTPATIENT
Start: 2017-12-20 | End: 2017-12-21 | Stop reason: HOSPADM

## 2017-12-20 RX ADMIN — HYDRALAZINE HYDROCHLORIDE 50 MG: 50 TABLET, FILM COATED ORAL at 07:55

## 2017-12-20 RX ADMIN — ALPRAZOLAM 1 MG: 1 TABLET ORAL at 09:00

## 2017-12-20 RX ADMIN — HYDRALAZINE HYDROCHLORIDE 50 MG: 50 TABLET, FILM COATED ORAL at 22:46

## 2017-12-20 RX ADMIN — IPRATROPIUM BROMIDE AND ALBUTEROL SULFATE 3 ML: .5; 3 SOLUTION RESPIRATORY (INHALATION) at 15:38

## 2017-12-20 RX ADMIN — Medication 10 ML: at 14:00

## 2017-12-20 RX ADMIN — IPRATROPIUM BROMIDE AND ALBUTEROL SULFATE 3 ML: .5; 3 SOLUTION RESPIRATORY (INHALATION) at 07:00

## 2017-12-20 RX ADMIN — FLUOXETINE 60 MG: 20 CAPSULE ORAL at 09:21

## 2017-12-20 RX ADMIN — METHYLPREDNISOLONE SODIUM SUCCINATE 40 MG: 40 INJECTION, POWDER, FOR SOLUTION INTRAMUSCULAR; INTRAVENOUS at 13:58

## 2017-12-20 RX ADMIN — HYDRALAZINE HYDROCHLORIDE 50 MG: 50 TABLET, FILM COATED ORAL at 17:15

## 2017-12-20 RX ADMIN — IPRATROPIUM BROMIDE AND ALBUTEROL SULFATE 3 ML: .5; 3 SOLUTION RESPIRATORY (INHALATION) at 11:15

## 2017-12-20 RX ADMIN — OXYCODONE HYDROCHLORIDE AND ACETAMINOPHEN 1 TABLET: 10; 325 TABLET ORAL at 03:00

## 2017-12-20 RX ADMIN — METHYLPREDNISOLONE SODIUM SUCCINATE 40 MG: 40 INJECTION, POWDER, FOR SOLUTION INTRAMUSCULAR; INTRAVENOUS at 22:47

## 2017-12-20 RX ADMIN — HEPARIN SODIUM 5000 UNITS: 5000 INJECTION, SOLUTION INTRAVENOUS; SUBCUTANEOUS at 22:46

## 2017-12-20 RX ADMIN — MONTELUKAST SODIUM 10 MG: 10 TABLET, FILM COATED ORAL at 22:46

## 2017-12-20 RX ADMIN — AMLODIPINE BESYLATE 10 MG: 10 TABLET ORAL at 09:21

## 2017-12-20 RX ADMIN — Medication 10 ML: at 22:47

## 2017-12-20 RX ADMIN — BUDESONIDE AND FORMOTEROL FUMARATE DIHYDRATE 2 PUFF: 160; 4.5 AEROSOL RESPIRATORY (INHALATION) at 08:01

## 2017-12-20 RX ADMIN — FAMOTIDINE 20 MG: 20 TABLET, FILM COATED ORAL at 09:21

## 2017-12-20 RX ADMIN — IPRATROPIUM BROMIDE AND ALBUTEROL SULFATE 3 ML: .5; 3 SOLUTION RESPIRATORY (INHALATION) at 19:21

## 2017-12-20 RX ADMIN — ALPRAZOLAM 1 MG: 1 TABLET ORAL at 22:46

## 2017-12-20 RX ADMIN — BUDESONIDE AND FORMOTEROL FUMARATE DIHYDRATE 2 PUFF: 160; 4.5 AEROSOL RESPIRATORY (INHALATION) at 19:22

## 2017-12-20 NOTE — PROGRESS NOTES
[facility] Hospitalist Group  Progress Note    Patient: Kam Pablo Age: 47 y.o. : 1965 MR#: [id number] SSN: [id number]  Date: 2017     Subjective:     Refer to nursing notes. Seen with nursing. Reports subjective fevers last PM, SOB, non-productive cough, generalized anterior chest pain, diffuse BLE pain. Pt is Ox3, answers questions appropriately. Reviewed current medical concerns, past medical hx, including recent [facility] admission and discharge. Discussed concerns for ongoing cocaine abuse, which pt admits to, as well as concern for drug-seeking behavior and narcotic dependency. Discussed discontinuing narcotics in context of dyspnea as has been discussed during previous admissions, strongest medical advice to be compliant and allow for testing and treatment. She expresses understand and agreement with current plan. Agrees to testing, treatment. Case d/w nursing supervisor as well. Assessment/Plan:   1. Acute COPD exac - lungs without wheeze, has slightly diminished BS throughout. Continue IV steroids, nebs, empiric abx. 2. Chronic hypoxic resp failure - pt reports baseline requirement of 2-3lpm via NC around-the-clock. Will maintain. 3. Chronic pain with drug-seeking behavior - as previously documented. Stopping narcotics. 4. HTN - BPs elevated. Follow with dosing of meds. 5. Morbid obesity - no acute. Outpt f/u.     Additional Notes:      Case discussed with:  [x]Patient  []Family  [x]Nursing  []Case Management  DVT Prophylaxis:  []Lovenox  [x]Hep SQ  []SCDs  []Coumadin   []On Heparin gtt    Objective:   VS:   Visit Vitals    /85 (BP 1 Location: Left arm, BP Patient Position: At rest)    Pulse (!) 103    Temp 98.2 °F (36.8 °C)    Resp 20    Ht 4' 11\" (1.499 m)    Wt 90.7 kg (200 lb)    SpO2 98%    BMI 40.4 kg/m2      Tmax/24hrs: Temp (24hrs), Av.6 °F (36.4 °C), Min:96.9 °F (36.1 °C), Max:98.7 °F (37.1 °C)  No intake or output data in the 24 hours ending 12/20/17 1146    General:  Awake, alert, Ox3. NAD. Cardiovascular:  RRR. Pulmonary:  CTA B, no wheeze. Mildly decreased BSounds throughout. GI:  Soft, NT/ND, NABS. Extremities:  No CT or edema. Additional:      Labs:    Recent Results (from the past 24 hour(s))   EKG, 12 LEAD, INITIAL    Collection Time: 12/19/17 12:45 PM   Result Value Ref Range    Ventricular Rate 89 BPM    Atrial Rate 89 BPM    P-R Interval 134 ms    QRS Duration 74 ms    Q-T Interval 378 ms    QTC Calculation (Bezet) 459 ms    Calculated P Axis 54 degrees    Calculated R Axis 55 degrees    Calculated T Axis 60 degrees    Diagnosis       Normal sinus rhythm  Nonspecific T wave abnormality  Abnormal ECG  When compared with ECG of 18-APR-2017 19:00,  Nonspecific T wave abnormality now evident in Inferior leads  Nonspecific T wave abnormality now evident in Lateral leads  Confirmed by Goldie Campbell MD, ----- ([id number]) on 12/19/2017 5:05:44 PM     PH, VENOUS    Collection Time: 12/19/17  1:45 PM   Result Value Ref Range    VENOUS PH 7.43 (H) 7.32 - 7.42     POC G3    Collection Time: 12/19/17  3:11 PM   Result Value Ref Range    Device: ROOM AIR      FIO2 (POC) 21 %    pH (POC) 7.437 7.35 - 7.45      pCO2 (POC) 37.5 35.0 - 45.0 MMHG    pO2 (POC) 74 (L) 80 - 100 MMHG    HCO3 (POC) 25.3 22 - 26 MMOL/L    sO2 (POC) 95 92 - 97 %    Base excess (POC) 1 mmol/L    Allens test (POC) YES      Total resp.  rate 20      Site LEFT RADIAL      Specimen type (POC) ARTERIAL      Performed by Nidia Haskins    CARDIAC PANEL,(CK, CKMB & TROPONIN)    Collection Time: 12/19/17  7:30 PM   Result Value Ref Range     26 - 192 U/L    CK - MB 1.1 <3.6 ng/ml    CK-MB Index 0.8 0.0 - 4.0 %    Troponin-I, Qt. <0.02 0.0 - 0.045 NG/ML       Signed By: Yossi Cordero MD     December 20, 2017 11:46 AM

## 2017-12-20 NOTE — ROUTINE PROCESS
Patient using very belligerent language and loud, verbalize upset over not being able to have food and snacks which were given upon request. Patient using profanity and threatening, aggressive gestures. Snack provided and primary nurse Anabel Sweeney RN made aware.

## 2017-12-20 NOTE — PROGRESS NOTES
Completed Echocardiogram. Report to follow. Patient to be transported back to room.     Felix Sidhu, RAUL, RDCS

## 2017-12-20 NOTE — ROUTINE PROCESS
0820-After encouragement, patient agreed to take her meds. IV restarted but patient threatening to remove it because \"it hurts'. When asked if I could remove it, patient states 'maybe when I get back\". 0840-Geni tech informed me pt was uncooperative with test.    0900-Pt discontinued her IV. No redness, pain or swelling at site.

## 2017-12-20 NOTE — CDMP QUERY
The diagnosis of acute respiratory failure has been documented for your patient. The current documentation does not meet criteria for this diagnosis, and may be challenged by an external reviewer. Could you please include the clinical indicators to support this diagnosis or indicate that acute respiratory failure has been ruled out for this admission? Current Documentation:   ABGs: Results for John Tabor (MRN [id number]) as of 12/20/2017 10:32  12/19/2017 15:11  pH (POC): 7.437  pCO2 (POC): 37.5  pO2 (POC): 74 (L)  HCO3 (POC): 25.3  sO2 (POC): 95  Base excess (POC): 1  FIO2 (POC): 21  Specimen type (POC): ARTERIAL  Site: LEFT RADIAL  Device[de-identified] ROOM AIR  Total resp. rate: 20  Allens test (POC): YES     * 12-19-17  ED Notes: \". ..O2 sat at rest 99%; ambulating O2 sat 95% and SOB. Rest O2 sat 99%. Noris Nieves Physical Exam: . .. Pulmonary/Chest: Breath sounds normal. Accessory muscle usage present. Tachypnea noted. No respiratory distress. ... MDM: consistent with exacerbation of patient's obstructive lung disease. Not hypoxic, but still has increased work of breathing, retractions, and tachypnea after nebs and steroids and difficulty ambulating down the hallway. *  12-19-17 ED Vital Signs: P: 87, 87; RR 18, 18; 96%, 98% on room air. *  12-19-17 H&P: \"Physical Exam: . Noris Nieves General: Alert, cooperative, no distress, appears stated age. Noris Nieves Lungs:   Clear to auscultation bilaterally. diminshed with exp wheeze . .. Active Problems: . Noris Nieves Acute resp failure w/hypercapnia (2-26-17) - recheck ABG;. Noris Nieves \"     REFERENCE:   Hypoxemic respiratory failure is characterized by a PaO2 less than 60 mmHg (or 10 mmHg below COPD patients baseline) and a normal or low arterial PaCO2. Hypercapnic respiratory failure is characterized by a PaCO2 higher than 50 mmHg (or 10 mmHG above COPD patients baseline).     Acute Respiratory Failure indicators include:   - Respirations <12 or >25   - Air hunger   - Use of accessory muscles of respiration   - Inability to speak in full sentences   - Cyanosis    AND    -  Pulse ox <90% RA or <95% on O2   - pH <7.35 or >7.45   - pO2 < 60 mm Hg (or 10mm below COPD patient's baseline)   - pCO2 >50mm Hg (or 10mm above COPD patient's baseline)     Please clarify and document your clinical opinion in the progress notes and discharge summary including the definitive and/or presumptive diagnosis, (suspected or probable), related to the above clinical findings.  Please include clinical findings supporting your diagnosis    Thank you,   Uvaldo Hooks RN

## 2017-12-20 NOTE — PROGRESS NOTES
conducted an initial consultation and Spiritual Assessment for Candy Berkowitz, who is a 47 y.o.,female. Patients Primary Language is: [place]. According to the patients EMR Jain Affiliation is: [facility].     The reason the Patient came to the hospital is:   Patient Active Problem List    Diagnosis Date Noted    Hypertensive heart failure ([address] 12/19/2017    Sleep apnea 12/19/2017    COPD (chronic obstructive pulmonary disease) with chronic bronchitis ([address] 12/19/2017    Acute asthma exacerbation 04/21/2017    Essential hypertension 03/10/2017    Morbid obesity due to excess calories ([address] 03/10/2017    Chest pain on breathing 03/10/2017    COPD with acute exacerbation ([address] 03/02/2017    Acute respiratory failure with hypercapnia ([address] 02/26/2017    Asthma exacerbation 01/26/2017    Respiratory failure ([address] 01/11/2017    Acute encephalopathy 01/11/2017    Acute exacerbation of chronic obstructive pulmonary disease (COPD) ([address] 08/29/2016    COPD with exacerbation (HCC) 07/11/2016    COPD exacerbation ([address] 03/12/2016    Acute on chronic respiratory failure with hypoxemia (HCC) 03/12/2016    Shortness of breath 03/05/2016    Asthma 03/05/2016    Abnormal CT of the chest 11/20/2015    Asthma exacerbation attacks 10/19/2015    Status asthmaticus with COPD (chronic obstructive pulmonary disease) ([address] 10/19/2015        The  provided the following Interventions:  Initiated a relationship of care and support. Provided information about Spiritual Care Services. Offered prayer and assurance of continued prayers on patient's behalf. Chart reviewed. Plan:  Chaplains will continue to follow and will provide pastoral care on an as needed/requested basis.       [address]  [phone])

## 2017-12-20 NOTE — ROUTINE PROCESS
Bedside and Verbal shift change report given to Yash Talavera RN (oncoming nurse) by Tracy Briones RN   (offgoing nurse). Report included the following information SBAR, Kardex, Intake/Output and MAR.

## 2017-12-20 NOTE — PROCEDURES
[facility]  [facility] ***    Name: Arlene Sher  MRN: [id number]    Inpatient  : 1965  HIS Order #: [id number]  [address] [facility] Visit #: [id number]  Date: 20 Dec 2017    TYPE OF TEST: Peripheral Venous Testing    REASON FOR TEST  Pain in limb    Right Leg:-  Deep venous thrombosis:           No  Superficial venous thrombosis:    No  Deep venous insufficiency:        Not examined  Superficial venous insufficiency: Not examined    Left Leg:-  Deep venous thrombosis:           No  Superficial venous thrombosis:    No  Deep venous insufficiency:        Not examined  Superficial venous insufficiency: Not examined      INTERPRETATION/FINDINGS  Duplex images were obtained using 2-D gray scale, color flow, and  spectral Doppler analysis. Right leg :  1. No evidence of deep venous thrombosis detected in the veins  visualized. 2. Deep vein(s) visualized include the common femoral, femoral,  popliteal, posterior tibial, and peroneal veins. 3. Unable to perform adequate compression analysis of the calf vessels   secondary to body habitus. Patency of vessels demonstrated with color   flow and doppler signals. However, can not rule out non-occlusive  deep venous thrombosis at noted vein segment. 4. No evidence of superficial thrombosis detected. 5. Superficial vein(s) visualized include the great saphenous vein at  the sapheno-femoral junction. Left leg :  1. No evidence of deep venous thrombosis detected in the veins  visualized. 2. Deep vein(s) visualized include the common femoral, femoral,  popliteal, posterior tibial, and peroneal veins. 3. Unable to perform adequate compression analysis of the calf vessels   secondary to body habitus. Patency of vessels demonstrated with color   flow and doppler signals. However, can not rule out non-occlusive  deep venous thrombosis at noted vein segment. 4. No evidence of superficial thrombosis detected.   5. Superficial vein(s) visualized include the great saphenous vein at  the sapheno-femoral junction. ADDITIONAL COMMENTS    I have personally reviewed the data relevant to the interpretation of  this  study. TECHNOLOGIST: Aliza Borrego RVT  Signed: 12/20/2017 01:18 PM    PHYSICIAN: Cody Butt MD  Signed: 12/20/2017 03:02 PM

## 2017-12-20 NOTE — PROGRESS NOTES
Pt has been very uncooperative, verbally abusive, and has refused every form of treatment since the start of evening shift. She accidentally pulled her IV out and would not allow me to insert another one. Her blood pressure has been extremely elevated and she again refused to take her Hydralazine. She is not short of breath, does not require O2, and she is also refusing to have her labs drawn. She will only take pain medication and over indulges in countless packs of crackers, fredis crackers, healthy choices, and beverages.

## 2017-12-20 NOTE — ROUTINE PROCESS
Bedside and Verbal shift change report given to Eliezer Whitlock RN (oncoming nurse) by Noah Lobo RN   (offgoing nurse). Report included the following information SBAR, Kardex, Intake/Output and MAR.

## 2017-12-20 NOTE — PROGRESS NOTES
Bedside and Verbal shift change report given to Dylan Lozada RN (oncoming nurse) by Aj Sanchez RN (offgoing nurse). Report included the following information SBAR, Kardex, MAR and Recent Results.     SITUATION:    Code Status: Full Code   Reason for Admission: Asthma exacerbation   COPD (chronic obstructive pulmonary disease) with chronic bronchitis ([address]   Hypertensive heart failure ([facility]   Sleep apnea    [facility] day: 0   Problem List:       Hospital Problems  Date Reviewed: 12/19/2017          Codes Class Noted POA    Hypertensive heart failure ([address] ICD-10-CM: I11.0  ICD-9-CM: 402.91, 428.9  12/19/2017 Unknown        Sleep apnea ICD-10-CM: G47.30  ICD-9-CM: 780.57  12/19/2017 Unknown        * (Principal)COPD (chronic obstructive pulmonary disease) with chronic bronchitis ([facility] ICD-10-CM: J44.9  ICD-9-CM: 491.20  12/19/2017 Unknown        Acute respiratory failure with hypercapnia (HCC) ICD-10-CM: J96.02  ICD-9-CM: 518.81  2/26/2017 Yes        Asthma exacerbation ICD-10-CM: J45.901  ICD-9-CM: 493.92  1/26/2017 Unknown        Asthma exacerbation attacks ICD-10-CM: J45.901  ICD-9-CM: 493.92  10/19/2015 Yes              BACKGROUND:    Past Medical History:   Past Medical History:   Diagnosis Date    Asthma     CHF (congestive heart failure) (HCC)     Chronic obstructive pulmonary disease ([facility]     Endocrine disease     thyroid issues    Gastrointestinal disorder     \"blockage in my stomach\"    Hypertension          Patient taking anticoagulants no     ASSESSMENT:    Changes in Assessment Throughout Shift: None      Patient has Central Line: no Reasons if yes: NA   Patient has Walker Cath: no Reasons if yes: NA      Last Vitals:     Vitals:    12/19/17 0740 12/19/17 1400 12/19/17 1637 12/19/17 1914   BP: 163/86 158/84 (!) 172/95 (!) 180/112   Pulse: 87 87 95 98   Resp: 18 18 18 18   Temp: 97.1 °F (36.2 °C) 97 °F (36.1 °C) 98.1 °F (36.7 °C) 98.7 °F (37.1 °C)   SpO2: 96% 98% 92% 92%   Weight: 90.7 kg (200 lb)      Height: 4' 11\" (1.499 m)           IV and DRAINS (will only show if present)   Peripheral IV 12/19/17 Left Antecubital-Site Assessment: Clean, dry, & intact     WOUND (if present)   Wound Type:  none   Dressing present Dressing Present : No   Wound Concerns/Notes:  none     PAIN    Pain Assessment    Pain Intensity 1: 0 (12/19/17 2000)    Pain Location 1: Back, Rib cage    Pain Intervention(s) 1: Medication (see MAR)    Patient Stated Pain Goal: 0  o Interventions for Pain: Medication (see MAR)  o Intervention effective: yes, patient stating same medication taken at home for pain (visibily seems more comfortable)  o Time of last intervention: 1706   o Reassessment Completed: yes      Last 3 Weights:  Last 3 Recorded Weights in this Encounter    12/19/17 0740   Weight: 90.7 kg (200 lb)     Weight change:      INTAKE/OUPUT    Current Shift:      Last three shifts:       LAB RESULTS     Recent Labs      12/19/17   0845   WBC  6.9   HGB  14.1   HCT  42.3   PLT  211        Recent Labs      12/19/17   0845   NA  142   K  3.7   GLU  87   BUN  6*   CREA  0.88   CA  8.8       RECOMMENDATIONS AND DISCHARGE PLANNING     Pending tests/procedures/ Plan of Care or Other Needs: TBD  1. Discharge plan for patient and Needs/Barriers: TBD    2. Estimated Discharge Date: TBD Posted on Whiteboard in Patients Room: yes      4. The patient's care plan was reviewed with the oncoming nurse. \"HEALS\" SAFETY CHECK      Fall Risk    Total Score: 0    Safety Measures: Safety Measures: Bed/Chair alarm on, Bed/Chair-Wheels locked, Bed in low position, Call light within reach, Fall prevention (comment), Side rails X2    A safety check occurred in the patient's room between off going nurse and oncoming nurse listed above.     The safety check included the below items  Area Items   H  High Alert Medications - Verify all high alert medication drips (heparin, PCA, etc.)   E  Equipment - Suction is set up for ALL patients (with rios)  - Red plugs utilized for all equipment (IV pumps, etc.)  - WOWs wiped down at end of shift.  - Room stocked with oxygen, suction, and other unit-specific supplies   A  Alarms - Bed alarm is set for fall risk patients  - Ensure chair alarm is in place and activated if patient is up in a chair   L  Lines - Check IV for any infiltration  - Walker bag is empty if patient has a Walker   - Tubing and IV bags are labeled   S  Safety   - Room is clean, patient is clean, and equipment is clean. - Hallways are clear from equipment besides carts. - Fall bracelet on for fall risk patients  - Ensure room is clear and free of clutter  - Suction is set up for ALL patients (with rios)  - Hallways are clear from equipment besides carts.    - Isolation precautions followed, supplies available outside room, sign posted     Edy Ackerman RN

## 2017-12-20 NOTE — PROGRESS NOTES
Attempted echocardiogram. Pt would not get off her cell phone so we could position her to start her test. She yelled at us when we tried to stop her from removing her IV. Pt was very uncooperative, verbally abusive, and refused to allow us to touch her with the transducer because she said it was to painful. Three echo techs attempted to calm her down to complete her test unsuccessfully. Patient was sent back to her room, nurse notified, echo order cancelled.

## 2017-12-21 VITALS
DIASTOLIC BLOOD PRESSURE: 83 MMHG | RESPIRATION RATE: 18 BRPM | TEMPERATURE: 98.5 F | OXYGEN SATURATION: 95 % | SYSTOLIC BLOOD PRESSURE: 178 MMHG | BODY MASS INDEX: 41.73 KG/M2 | HEART RATE: 107 BPM | HEIGHT: 59 IN | WEIGHT: 207 LBS

## 2017-12-21 LAB
ANION GAP SERPL CALC-SCNC: 9 MMOL/L (ref 3–18)
BASOPHILS # BLD: 0 K/UL (ref 0–0.06)
BASOPHILS NFR BLD: 0 % (ref 0–2)
BUN SERPL-MCNC: 15 MG/DL (ref 7–18)
BUN/CREAT SERPL: 15 (ref 12–20)
CALCIUM SERPL-MCNC: 9.1 MG/DL (ref 8.5–10.1)
CHLORIDE SERPL-SCNC: 106 MMOL/L (ref 100–108)
CO2 SERPL-SCNC: 25 MMOL/L (ref 21–32)
CREAT SERPL-MCNC: 0.97 MG/DL (ref 0.6–1.3)
DIFFERENTIAL METHOD BLD: ABNORMAL
EOSINOPHIL # BLD: 0 K/UL (ref 0–0.4)
EOSINOPHIL NFR BLD: 0 % (ref 0–5)
ERYTHROCYTE [DISTWIDTH] IN BLOOD BY AUTOMATED COUNT: 14.4 % (ref 11.6–14.5)
GLUCOSE SERPL-MCNC: 152 MG/DL (ref 74–99)
HCT VFR BLD AUTO: 44.8 % (ref 35–45)
HGB BLD-MCNC: 14.9 G/DL (ref 12–16)
LYMPHOCYTES # BLD: 1.1 K/UL (ref 0.9–3.6)
LYMPHOCYTES NFR BLD: 10 % (ref 21–52)
MCH RBC QN AUTO: 29.9 PG (ref 24–34)
MCHC RBC AUTO-ENTMCNC: 33.3 G/DL (ref 31–37)
MCV RBC AUTO: 89.8 FL (ref 74–97)
MONOCYTES # BLD: 0.1 K/UL (ref 0.05–1.2)
MONOCYTES NFR BLD: 1 % (ref 3–10)
NEUTS SEG # BLD: 9.4 K/UL (ref 1.8–8)
NEUTS SEG NFR BLD: 89 % (ref 40–73)
PLATELET # BLD AUTO: 294 K/UL (ref 135–420)
PMV BLD AUTO: 11.2 FL (ref 9.2–11.8)
POTASSIUM SERPL-SCNC: 3.8 MMOL/L (ref 3.5–5.5)
RBC # BLD AUTO: 4.99 M/UL (ref 4.2–5.3)
SODIUM SERPL-SCNC: 140 MMOL/L (ref 136–145)
WBC # BLD AUTO: 10.6 K/UL (ref 4.6–13.2)

## 2017-12-21 PROCEDURE — 74011000250 HC RX REV CODE- 250: Performed by: INTERNAL MEDICINE

## 2017-12-21 PROCEDURE — 80048 BASIC METABOLIC PNL TOTAL CA: CPT | Performed by: FAMILY MEDICINE

## 2017-12-21 PROCEDURE — 74011250637 HC RX REV CODE- 250/637: Performed by: FAMILY MEDICINE

## 2017-12-21 PROCEDURE — 94640 AIRWAY INHALATION TREATMENT: CPT

## 2017-12-21 PROCEDURE — 74011250636 HC RX REV CODE- 250/636: Performed by: INTERNAL MEDICINE

## 2017-12-21 PROCEDURE — 36415 COLL VENOUS BLD VENIPUNCTURE: CPT | Performed by: FAMILY MEDICINE

## 2017-12-21 PROCEDURE — 74011250636 HC RX REV CODE- 250/636: Performed by: FAMILY MEDICINE

## 2017-12-21 PROCEDURE — 85025 COMPLETE CBC W/AUTO DIFF WBC: CPT | Performed by: FAMILY MEDICINE

## 2017-12-21 PROCEDURE — 74011250637 HC RX REV CODE- 250/637: Performed by: INTERNAL MEDICINE

## 2017-12-21 RX ORDER — PREDNISONE 10 MG/1
10 TABLET ORAL
Qty: 18 TAB | Refills: 0 | Status: SHIPPED | OUTPATIENT
Start: 2017-12-21 | End: 2018-01-26

## 2017-12-21 RX ADMIN — BUDESONIDE AND FORMOTEROL FUMARATE DIHYDRATE 2 PUFF: 160; 4.5 AEROSOL RESPIRATORY (INHALATION) at 09:22

## 2017-12-21 RX ADMIN — FLUOXETINE 60 MG: 20 CAPSULE ORAL at 09:09

## 2017-12-21 RX ADMIN — METHYLPREDNISOLONE SODIUM SUCCINATE 40 MG: 40 INJECTION, POWDER, FOR SOLUTION INTRAMUSCULAR; INTRAVENOUS at 06:00

## 2017-12-21 RX ADMIN — HYDRALAZINE HYDROCHLORIDE 50 MG: 50 TABLET, FILM COATED ORAL at 09:09

## 2017-12-21 RX ADMIN — HEPARIN SODIUM 5000 UNITS: 5000 INJECTION, SOLUTION INTRAVENOUS; SUBCUTANEOUS at 06:00

## 2017-12-21 RX ADMIN — FAMOTIDINE 20 MG: 20 TABLET, FILM COATED ORAL at 09:09

## 2017-12-21 RX ADMIN — AMLODIPINE BESYLATE 10 MG: 10 TABLET ORAL at 09:09

## 2017-12-21 RX ADMIN — IPRATROPIUM BROMIDE AND ALBUTEROL SULFATE 3 ML: .5; 3 SOLUTION RESPIRATORY (INHALATION) at 09:22

## 2017-12-21 RX ADMIN — ALPRAZOLAM 1 MG: 1 TABLET ORAL at 09:13

## 2017-12-21 RX ADMIN — Medication 10 ML: at 06:00

## 2017-12-21 NOTE — PROGRESS NOTES
Care Management Interventions  Transition of Care Consult (CM Consult): [address]: No  Reason Outside [place]: Patient declined ordered home care/hospice services  Current Support Network:  (Pt has been staying with friends and family members)  Confirm Follow Up Transport: Cab (Medicaid cab)  Plan discussed with Pt/Family/Caregiver: Yes  Discharge Location  Discharge Placement: Homeless     Pt is a 47year old female admitted for asthma exacerbation, COPD, hypertensive heart failure, sleep apnea. Pt states that she doesn't have a steady placement and have been staying with friends and family on and off. Pt's plan is to return to [place] [address] to  her personal belongings and she will be going to a homeless shelter on [address] in [address] [place] [address]. Medicaid cab scheduled to provide pt with transportation home. Reference # [id number] was provided for this scheduled trip.

## 2017-12-21 NOTE — ROUTINE PROCESS
Bedside shift change report given to Jaja Nesbitt RN (oncoming nurse) by Darci Singleton RN (offgoing nurse). Report included the following information SBAR, Kardex, Intake/Output, MAR, Recent Results and Med Rec Status.

## 2017-12-21 NOTE — PROGRESS NOTES
Problem: Falls - Risk of  Goal: *Absence of Falls  Document Delory Fall Risk and appropriate interventions in the flowsheet.    Outcome: Progressing Towards Goal  Fall Risk Interventions:            Medication Interventions: Patient to call before getting OOB, Teach patient to arise slowly

## 2017-12-21 NOTE — PROGRESS NOTES
D/C pt home, removed tele and right forearm iv. Pt has belongings with her and discharge instruction explained. No questions or pain at this time.

## 2017-12-21 NOTE — ROUTINE PROCESS
Bedside shift change report given to Tammi Amin RN (oncoming nurse) by Michelet Fermin RN (offgoing nurse). Report included the following information SBAR, Kardex, Intake/Output, MAR, Recent Results and Med Rec Status.

## 2017-12-22 NOTE — DISCHARGE SUMMARY
[facility]    Randa Larose  MR#: [id number]  : 1965  ACCOUNT #: [de-identified]   ADMIT DATE: 2017  DISCHARGE DATE: 2017    DISCHARGE DIAGNOSES:  1. Acute chronic obstructive pulmonary disease exacerbation. 2.  Chronic hypoxic respiratory failure. 3.  Chronic pain with drug seeking behavior. 4.  Hypertension. 5.  Morbid obesity. SERVICE:  Patient was admitted to hospitalist service. CONSULTS:  None. PROCEDURES:  None. BRIEF HISTORY OF PRESENT ILLNESS:  Please refer to admission H and P.  Briefly, Ms. Rajat Sanchez is a 76-year-old female with medical history significant for the above who came to the Emergency Department complaining of not feeling well with cough and wheezing. She complained of some dyspnea as well. She was given multiple nebulizer treatments without any improvement. When we saw her vital signs are stable and normal except for pressure 163/86. She appears to be in no distress. Had irregular heart, clear lungs with diminished breath sounds, and expiratory wheeze. No edema. She had a chest x-ray on admission showing stable mild cardiomegaly. She had a CT scan of her chest as well showing no acute findings to explain the patient's symptoms. Right thyroid enlargement with multinodular goiter previously and better evaluated with ultrasound. Metabolic panel normal.    CBC within normal limits as well. HOSPITAL COURSE:   1. Acute chronic obstructive pulmonary disease exacerbation. Maintain on IV Solu-Medrol, nebulized empiric antibiotics. She quickly improved over the course of her hospital stay, did not have any wheezing on my examination. No acute issues. We will discharge patient to continue a prednisone taper. Nothing to suggest that she has an active infectious process ongoing, so I will stop antibiotics at the current time. 2.  Chronic hypoxic respiratory failure:   The patient reports baseline use of 2-3 liters per minute of oxygen via nasal cannula around the clock. She has been off her oxygen at times refusing this. She has been saturating well. She may resume this at home. 3.  Chronic pain with drug seeking behavior:  As previously documented with prior admissions as well as from the Care Everywhere system. Yesterday, she was refusing treatment and testing. She had removed her IV, and according to the records and discussion with the floor staff, she was uncooperative, verbally abusive and according to the staff was ambulating through the nursing station using profanity, loud and threatening language, and according to the staff again at times was physically using aggressive gestures. She was only taking her pain medications, but after I discussed with the patient about current medical concerns, her past medical history,   concerns for ongoing cocaine abuse, which she admitted to as well as concern for drug seeking behavior and narcotic dependency, we reached the plan of discontinuing all narcotics, and proceeding with testing. This discussion was held with a nurse present. I advised her that she is to contact her primary care physician to refill her controlled substances and that I will not be writing for new benzodiazepines or opioids. She expressed understanding and agreement. 4.  Hypertension:  Blood pressure is elevated, I wonder if she is compliant with her medications. She can resume her outpatient medicines. I think this can be followed as an outpatient. 5.  Morbid obesity:  No acute issues. Outpatient followup. PHYSICAL EXAMINATION:  On examination today, vital signs are stable and normal with a pulse 107, pressure 173/83. The patient was sleeping and appeared to be comfortable on room air. She rises easily to verbal stimuli, denied any fevers or chills, nausea, vomiting, chest pain or shortness of breath initially, and then started to sound wheezy. She had a regular heart.   She has clear lungs with no wheezing heard, but she did have such sounds emanate from her neck. When I asked her to breathe normally, she had clear lungs. She has a benign abdomen, no calf tenderness or edema. LABORATORY DATA:  CBC and metabolic panel normal.  We did obtain lower extremity Dopplers yesterday because she was complaining of lower extremity pain. This was negative for DVT. We obtained a 2D echocardiogram as well, she had an EF of 70% to 75% with no obvious wall motion abnormalities. Trivial regurgitation of her mitral valve. DISPOSITION:  Patient will be discharged home with home health for medication education management and compliance. MEDICATIONS ON DISCHARGE:  New medicines as follows:  1. Prednisone 10 mg tablet, she is to take four tabs by mouth for 2 days, then 3 tabs for 2 days and 2 tabs for 2 days and 1 tab for 2 days and stop. Otherwise, continue to following medications:    1. Albuterol nebulizer every 4 hours as needed for wheezing or shortness of breath, or MDI 1 puff every 4 hours as needed. 2.  Xanax 1 mg p.o. b.i.d. as needed. I have not written for any prescription for this. 3.  Norvasc 10 mg p.o. every day. 4.  Brovana nebulizer b.i.d.  5.  Pulmicort nebulizer b.i.d.  6.  Symbicort 160/4.5 mcg 2 puffs b.i.d.  7.  Bumex 0.5 mg p.o. q.48h.  8.  Pepcid 20 mg p.o. every day. 9.  Prozac 20 mg 3 caps p.o. every day. 10.  Hydralazine 50 mg p.o. t.i.d.  11.  Singulair 10 mg p.o. q.p.m.   12.  Percocet 10/325 mg every 8 hours as needed for pain. I have not written for a new prescription for this. 13.  Spiriva 18 mcg inhaled daily. FOLLOWUP:  With the PCP, Dr. Prema Thomas in 7 to 10 days. Total time spent on discharge greater than 30 minutes.       MD JAYCE Jc/BRIANA  D: 12/21/2017 12:12     T: 12/22/2017 10:36  JOB #: [id number]

## 2018-01-18 NOTE — ADT AUTH CERT NOTES
Attention:    Please call with the status of this authorization request. Response needed urgently. My voicemail and fax line are completely confidential, please leave detailed information.      Thank you,    Kishor Robbins    Tel: [phone]  Fax: [phone]

## 2018-01-26 ENCOUNTER — APPOINTMENT (OUTPATIENT)
Dept: GENERAL RADIOLOGY | Age: 53
End: 2018-01-26
Attending: EMERGENCY MEDICINE
Payer: MEDICAID

## 2018-01-26 ENCOUNTER — HOSPITAL ENCOUNTER (EMERGENCY)
Age: 53
Discharge: HOME | End: 2018-01-26
Attending: EMERGENCY MEDICINE
Payer: MEDICAID

## 2018-01-26 VITALS
TEMPERATURE: 98.5 F | SYSTOLIC BLOOD PRESSURE: 149 MMHG | RESPIRATION RATE: 22 BRPM | WEIGHT: 200 LBS | OXYGEN SATURATION: 100 % | BODY MASS INDEX: 40.32 KG/M2 | HEIGHT: 59 IN | HEART RATE: 75 BPM | DIASTOLIC BLOOD PRESSURE: 111 MMHG

## 2018-01-26 DIAGNOSIS — J45.909 MODERATE ASTHMA, UNSPECIFIED WHETHER COMPLICATED, UNSPECIFIED WHETHER PERSISTENT: Primary | ICD-10-CM

## 2018-01-26 LAB
ALBUMIN SERPL-MCNC: 3.4 G/DL (ref 3.4–5)
ALBUMIN/GLOB SERPL: 0.9 {RATIO} (ref 0.8–1.7)
ALP SERPL-CCNC: 132 U/L (ref 45–117)
ALT SERPL-CCNC: 20 U/L (ref 13–56)
ANION GAP SERPL CALC-SCNC: 3 MMOL/L (ref 3–18)
AST SERPL-CCNC: 11 U/L (ref 15–37)
BASOPHILS # BLD: 0 K/UL (ref 0–0.1)
BASOPHILS NFR BLD: 0 % (ref 0–2)
BILIRUB SERPL-MCNC: 0.2 MG/DL (ref 0.2–1)
BUN SERPL-MCNC: 10 MG/DL (ref 7–18)
BUN/CREAT SERPL: 11 (ref 12–20)
CALCIUM SERPL-MCNC: 8.7 MG/DL (ref 8.5–10.1)
CHLORIDE SERPL-SCNC: 105 MMOL/L (ref 100–108)
CK MB CFR SERPL CALC: NORMAL % (ref 0–4)
CK MB SERPL-MCNC: <1 NG/ML (ref 5–25)
CK SERPL-CCNC: 44 U/L (ref 26–192)
CO2 SERPL-SCNC: 33 MMOL/L (ref 21–32)
CREAT SERPL-MCNC: 0.88 MG/DL (ref 0.6–1.3)
DIFFERENTIAL METHOD BLD: NORMAL
EOSINOPHIL # BLD: 0.2 K/UL (ref 0–0.4)
EOSINOPHIL NFR BLD: 2 % (ref 0–5)
ERYTHROCYTE [DISTWIDTH] IN BLOOD BY AUTOMATED COUNT: 12.8 % (ref 11.6–14.5)
GLOBULIN SER CALC-MCNC: 3.8 G/DL (ref 2–4)
GLUCOSE SERPL-MCNC: 110 MG/DL (ref 74–99)
HCT VFR BLD AUTO: 44.6 % (ref 35–45)
HGB BLD-MCNC: 14.8 G/DL (ref 12–16)
LYMPHOCYTES # BLD: 2.5 K/UL (ref 0.9–3.6)
LYMPHOCYTES NFR BLD: 35 % (ref 21–52)
MAGNESIUM SERPL-MCNC: 2.1 MG/DL (ref 1.6–2.6)
MCH RBC QN AUTO: 29.6 PG (ref 24–34)
MCHC RBC AUTO-ENTMCNC: 33.2 G/DL (ref 31–37)
MCV RBC AUTO: 89.2 FL (ref 74–97)
MONOCYTES # BLD: 0.3 K/UL (ref 0.05–1.2)
MONOCYTES NFR BLD: 4 % (ref 3–10)
NEUTS SEG # BLD: 4.2 K/UL (ref 1.8–8)
NEUTS SEG NFR BLD: 59 % (ref 40–73)
PLATELET # BLD AUTO: 245 K/UL (ref 135–420)
PMV BLD AUTO: 11.4 FL (ref 9.2–11.8)
POTASSIUM SERPL-SCNC: 3.7 MMOL/L (ref 3.5–5.5)
PROT SERPL-MCNC: 7.2 G/DL (ref 6.4–8.2)
RBC # BLD AUTO: 5 M/UL (ref 4.2–5.3)
SODIUM SERPL-SCNC: 141 MMOL/L (ref 136–145)
TROPONIN I SERPL-MCNC: <0.02 NG/ML (ref 0–0.04)
WBC # BLD AUTO: 7.2 K/UL (ref 4.6–13.2)

## 2018-01-26 PROCEDURE — 93005 ELECTROCARDIOGRAM TRACING: CPT

## 2018-01-26 PROCEDURE — 94640 AIRWAY INHALATION TREATMENT: CPT

## 2018-01-26 PROCEDURE — 74011000250 HC RX REV CODE- 250: Performed by: EMERGENCY MEDICINE

## 2018-01-26 PROCEDURE — 74011250636 HC RX REV CODE- 250/636: Performed by: EMERGENCY MEDICINE

## 2018-01-26 PROCEDURE — 80053 COMPREHEN METABOLIC PANEL: CPT | Performed by: EMERGENCY MEDICINE

## 2018-01-26 PROCEDURE — 83735 ASSAY OF MAGNESIUM: CPT | Performed by: EMERGENCY MEDICINE

## 2018-01-26 PROCEDURE — 71045 X-RAY EXAM CHEST 1 VIEW: CPT

## 2018-01-26 PROCEDURE — 82550 ASSAY OF CK (CPK): CPT | Performed by: EMERGENCY MEDICINE

## 2018-01-26 PROCEDURE — 77030029684 HC NEB SM VOL KT MONA -A

## 2018-01-26 PROCEDURE — 99284 EMERGENCY DEPT VISIT MOD MDM: CPT

## 2018-01-26 PROCEDURE — 85025 COMPLETE CBC W/AUTO DIFF WBC: CPT | Performed by: EMERGENCY MEDICINE

## 2018-01-26 PROCEDURE — 96375 TX/PRO/DX INJ NEW DRUG ADDON: CPT

## 2018-01-26 PROCEDURE — 96365 THER/PROPH/DIAG IV INF INIT: CPT

## 2018-01-26 PROCEDURE — 74011250637 HC RX REV CODE- 250/637: Performed by: EMERGENCY MEDICINE

## 2018-01-26 RX ORDER — ACETAMINOPHEN AND CODEINE PHOSPHATE 300; 30 MG/1; MG/1
1 TABLET ORAL
Status: COMPLETED | OUTPATIENT
Start: 2018-01-26 | End: 2018-01-26

## 2018-01-26 RX ORDER — ACETAMINOPHEN AND CODEINE PHOSPHATE 300; 30 MG/1; MG/1
1 TABLET ORAL
Status: DISCONTINUED | OUTPATIENT
Start: 2018-01-26 | End: 2018-01-26 | Stop reason: HOSPADM

## 2018-01-26 RX ORDER — IPRATROPIUM BROMIDE AND ALBUTEROL SULFATE 2.5; .5 MG/3ML; MG/3ML
3 SOLUTION RESPIRATORY (INHALATION) ONCE
Status: COMPLETED | OUTPATIENT
Start: 2018-01-26 | End: 2018-01-26

## 2018-01-26 RX ORDER — PREDNISONE 50 MG/1
50 TABLET ORAL DAILY
Qty: 5 TAB | Refills: 0 | Status: SHIPPED | OUTPATIENT
Start: 2018-01-26 | End: 2018-01-31

## 2018-01-26 RX ORDER — ALBUTEROL SULFATE 90 UG/1
2 AEROSOL, METERED RESPIRATORY (INHALATION)
Qty: 1 INHALER | Refills: 0 | Status: SHIPPED | OUTPATIENT
Start: 2018-01-26 | End: 2018-08-23

## 2018-01-26 RX ORDER — ALBUTEROL SULFATE 0.83 MG/ML
2.5 SOLUTION RESPIRATORY (INHALATION)
Qty: 30 EACH | Refills: 0 | Status: ON HOLD | OUTPATIENT
Start: 2018-01-26 | End: 2019-03-14 | Stop reason: SDUPTHER

## 2018-01-26 RX ORDER — MAGNESIUM SULFATE HEPTAHYDRATE 40 MG/ML
2 INJECTION, SOLUTION INTRAVENOUS
Status: COMPLETED | OUTPATIENT
Start: 2018-01-26 | End: 2018-01-26

## 2018-01-26 RX ADMIN — METHYLPREDNISOLONE SODIUM SUCCINATE 125 MG: 125 INJECTION, POWDER, FOR SOLUTION INTRAMUSCULAR; INTRAVENOUS at 09:56

## 2018-01-26 RX ADMIN — IPRATROPIUM BROMIDE AND ALBUTEROL SULFATE 3 ML: .5; 3 SOLUTION RESPIRATORY (INHALATION) at 09:56

## 2018-01-26 RX ADMIN — ACETAMINOPHEN AND CODEINE PHOSPHATE 1 TABLET: 300; 30 TABLET ORAL at 11:14

## 2018-01-26 RX ADMIN — MAGNESIUM SULFATE IN WATER 2 G: 40 INJECTION, SOLUTION INTRAVENOUS at 09:56

## 2018-01-26 NOTE — ED NOTES
Pt states she is not ready to leave because her ride isn't here yet. Patient sitting on wheelchair talking to friend.

## 2018-01-26 NOTE — DISCHARGE INSTRUCTIONS
Asthma Attack: Care Instructions  Your Care Instructions    During an asthma attack, the airways swell and narrow. This makes it hard to breathe. Severe asthma attacks can be life-threatening, but you can help prevent them by keeping your asthma under control and treating symptoms before they get bad. Symptoms include being short of breath, having chest tightness, coughing, and wheezing. Noting and treating these symptoms can also help you avoid future trips to the emergency room. The doctor has checked you carefully, but problems can develop later. If you notice any problems or new symptoms, get medical treatment right away. Follow-up care is a key part of your treatment and safety. Be sure to make and go to all appointments, and call your doctor if you are having problems. It's also a good idea to know your test results and keep a list of the medicines you take. How can you care for yourself at home? · Follow your asthma action plan to prevent and treat attacks. If you don't have an asthma action plan, work with your doctor to create one. · Take your asthma medicines exactly as prescribed. Talk to your doctor right away if you have any questions about how to take them. ¨ Use your quick-relief medicine when you have symptoms of an attack. Quick-relief medicine is usually an albuterol inhaler. Some people need to use quick-relief medicine before they exercise. ¨ Take your controller medicine every day, not just when you have symptoms. Controller medicine is usually an inhaled corticosteroid. The goal is to prevent problems before they occur. Don't use your controller medicine to treat an attack that has already started. It doesn't work fast enough to help. ¨ If your doctor prescribed corticosteroid pills to use during an attack, take them exactly as prescribed. It may take hours for the pills to work, but they may make the episode shorter and help you breathe better.   ¨ Keep your quick-relief medicine with you at all times. · Talk to your doctor before using other medicines. Some medicines, such as aspirin, can cause asthma attacks in some people. · If you have a peak flow meter, use it to check how well you are breathing. This can help you predict when an asthma attack is going to occur. Then you can take medicine to prevent the asthma attack or make it less severe. · Do not smoke or allow others to smoke around you. Avoid smoky places. Smoking makes asthma worse. If you need help quitting, talk to your doctor about stop-smoking programs and medicines. These can increase your chances of quitting for good. · Learn what triggers an asthma attack for you, and avoid the triggers when you can. Common triggers include colds, smoke, air pollution, dust, pollen, mold, pets, cockroaches, stress, and cold air. · Avoid colds and the flu. Get a pneumococcal vaccine shot. If you have had one before, ask your doctor if you need a second dose. Get a flu vaccine every fall. If you must be around people with colds or the flu, wash your hands often. When should you call for help? Call 911 anytime you think you may need emergency care. For example, call if:  ? · You have severe trouble breathing. ?Call your doctor now or seek immediate medical care if:  ? · Your symptoms do not get better after you have followed your asthma action plan. ? · You have new or worse trouble breathing. ? · Your coughing and wheezing get worse. ? · You cough up dark brown or bloody mucus (sputum). ? · You have a new or higher fever. ? Watch closely for changes in your health, and be sure to contact your doctor if:  ? · You need to use quick-relief medicine on more than 2 days a week (unless it is just for exercise). ? · You cough more deeply or more often, especially if you notice more mucus or a change in the color of your mucus. ? · You are not getting better as expected. Where can you learn more?   Go to http://magda-kadie.info/. Enter G224 in the search box to learn more about \"Asthma Attack: Care Instructions. \"  Current as of: May 12, 2017  Content Version: 11.4  © 2212-1460 [employer], [employer]. Care instructions adapted under license by [employer] (which disclaims liability or warranty for this information). If you have questions about a medical condition or this instruction, always ask your healthcare professional. [place] [address] any warranty or liability for your use of this information.

## 2018-01-26 NOTE — ED NOTES
Pt states, \"I always get morphine when I come, ask the doctor, Lilliane Bark is my morphine\". Pt c/o rib pain from coughing, and belly pain when she coughs, d/t her hernia popping out. Provider notified.

## 2018-01-26 NOTE — ED NOTES
Pt states, \"I want the morpine, I don't take tylenol. I take perc, and if I don't take perc, I take morphine. \" Pt refused codeine.

## 2018-01-26 NOTE — ED PROVIDER NOTES
EMERGENCY DEPARTMENT HISTORY AND PHYSICAL EXAM    9:41 AM      Date: 1/26/2018  Patient Name: Ivon Ellis    History of Presenting Illness     Chief Complaint   Patient presents with    Respiratory Distress         History Provided By: Patient    Chief Complaint: SOb  Duration:4  Days  Timing:  Constant  Location: N/A  Quality: N/A  Severity: Moderate  Modifying Factors: No relief from nebulizer or rescue inhaler  Associated Symptoms: CP, Cough, Nausea      Additional History (Context): 9:42 AM Ivon Ellis is a 47 y.o. female with h/o Asthma and COPD who presents to ED complaining of constant moderate SOB associated with CP, cough and nausea onset 4 days ago. Patient states that she has tried 2 nebulizers today and her rescue inhaler with no relief of SOB. States that her cough is productive with grayish sputum and that her CP is located in the center of her chest. She admits to having 1 episode of vomiting on Monday but non today. Admits to being a former smoker. Denies fever, diarrhea, and hx of DM. States that she had some abd pain due to hernia. No other concerns or symptoms at this time. PCP: Shalini Knox MD    Current Facility-Administered Medications   Medication Dose Route Frequency Provider Last Rate Last Dose    acetaminophen-codeine (TYLENOL #3) per tablet 1 Tab  1 Tab Oral NOW Lizett Peres,          Current Outpatient Prescriptions   Medication Sig Dispense Refill    predniSONE (DELTASONE) 50 mg tablet Take 1 Tab by mouth daily for 5 days. 5 Tab 0    albuterol (PROAIR HFA) 90 mcg/actuation inhaler Take 2 Puffs by inhalation every four (4) hours as needed for Wheezing. 1 Inhaler 0    albuterol (PROVENTIL VENTOLIN) 2.5 mg /3 mL (0.083 %) nebulizer solution 3 mL by Nebulization route every four (4) hours as needed for Wheezing. 30 Each 0    ALPRAZolam (XANAX) 1 mg tablet Take 1 Tab by mouth two (2) times a day.  Max Daily Amount: 2 mg. 20 Tab 0    oxyCODONE-acetaminophen (PERCOCET 10)  mg per tablet Take 1 Tab by mouth every eight (8) hours as needed. Max Daily Amount: 3 Tabs. 20 Tab 0    amLODIPine (NORVASC) 10 mg tablet Take 1 Tab by mouth daily. 30 Tab 1    budesonide-formoterol (SYMBICORT) 160-4.5 mcg/actuation HFA inhaler Take 2 Puffs by inhalation two (2) times a day. 1 Inhaler 1    bumetanide (BUMEX) 0.5 mg tablet Take 1 Tab by mouth every fourty-eight (48) hours. 30 Tab 1    famotidine (PEPCID) 20 mg tablet Take 1 Tab by mouth daily. 30 Tab 1    FLUoxetine (PROZAC) 20 mg capsule Take 3 Caps by mouth daily. 90 Cap 1    montelukast (SINGULAIR) 10 mg tablet Take 1 Tab by mouth nightly. 30 Tab 1    tiotropium (SPIRIVA) 18 mcg inhalation capsule Take 1 Cap by inhalation daily. 30 Cap 1    arformoterol (BROVANA) 15 mcg/2 mL nebu neb solution 2 mL by Nebulization route two (2) times a day. 60 Vial 1    budesonide (PULMICORT) 0.5 mg/2 mL nbsp 2 mL by Nebulization route two (2) times a day. 60 Each 1    hydrALAZINE (APRESOLINE) 50 mg tablet Take 1 Tab by mouth three (3) times daily. 80 Tab 1       Past History     Past Medical History:  Past Medical History:   Diagnosis Date    Asthma     CHF (congestive heart failure) (HCC)     Chronic obstructive pulmonary disease (HCC)     Endocrine disease     thyroid issues    Gastrointestinal disorder     \"blockage in my stomach\"    Hypertension        Past Surgical History:  History reviewed. No pertinent surgical history. Family History:  History reviewed. No pertinent family history. Social History:  Social History   Substance Use Topics    Smoking status: Never Smoker    Smokeless tobacco: Never Used    Alcohol use No      Comment: socially       Allergies:  No Known Allergies      Review of Systems       Review of Systems   Constitutional: Negative. Negative for chills, diaphoresis and fever. HENT: Negative. Negative for congestion, rhinorrhea and sore throat. Eyes: Negative.   Negative for pain, discharge and redness. Respiratory: Positive for cough and shortness of breath. Negative for chest tightness and wheezing. Cardiovascular: Positive for chest pain. Gastrointestinal: Positive for nausea. Negative for abdominal pain, constipation, diarrhea and vomiting. Genitourinary: Negative. Negative for dysuria, flank pain, frequency, hematuria and urgency. Musculoskeletal: Negative. Negative for back pain and neck pain. Skin: Negative. Negative for rash. Neurological: Negative. Negative for syncope, weakness, numbness and headaches. Psychiatric/Behavioral: Negative. All other systems reviewed and are negative. Physical Exam     Visit Vitals    BP (!) 149/111 (BP 1 Location: Left arm, BP Patient Position: Sitting)    Pulse 75    Temp 98.5 °F (36.9 °C)    Resp 22    Ht 4' 11\" (1.499 m)    Wt 90.7 kg (200 lb)    SpO2 100%    BMI 40.4 kg/m2         Physical Exam   Constitutional: She is oriented to person, place, and time. She appears well-developed and well-nourished. Non-toxic appearance. She does not have a sickly appearance. She does not appear ill. No distress. HENT:   Head: Normocephalic and atraumatic. Mouth/Throat: Oropharynx is clear and moist. No oropharyngeal exudate. Eyes: Conjunctivae and EOM are normal. Pupils are equal, round, and reactive to light. No scleral icterus. Neck: Normal range of motion. Neck supple. No hepatojugular reflux and no JVD present. No tracheal deviation present. No thyromegaly present. Cardiovascular: Normal rate, regular rhythm, S1 normal, S2 normal, normal heart sounds, intact distal pulses and normal pulses. Exam reveals no gallop, no S3 and no S4. No murmur heard. Pulses:       Radial pulses are 2+ on the right side, and 2+ on the left side. Dorsalis pedis pulses are 2+ on the right side, and 2+ on the left side. Pulmonary/Chest: Effort normal. No accessory muscle usage. No respiratory distress.  She has no decreased breath sounds. She has wheezes in the right middle field, the right lower field, the left middle field and the left lower field. She has no rhonchi. She has no rales. Abdominal: Soft. Normal appearance and bowel sounds are normal. She exhibits no distension and no mass. There is no hepatosplenomegaly. There is no tenderness. There is no rigidity, no rebound, no guarding, no CVA tenderness, no tenderness at McBurney's point and negative Vasquez's sign. Musculoskeletal: Normal range of motion. Lymphadenopathy:        Head (right side): No submental, no submandibular, no preauricular and no occipital adenopathy present. Head (left side): No submental, no submandibular, no preauricular and no occipital adenopathy present. She has no cervical adenopathy. Right: No supraclavicular adenopathy present. Left: No supraclavicular adenopathy present. Neurological: She is alert and oriented to person, place, and time. She has normal strength and normal reflexes. She is not disoriented. No cranial nerve deficit or sensory deficit. Coordination and gait normal. GCS eye subscore is 4. GCS verbal subscore is 5. GCS motor subscore is 6. Skin: Skin is warm, dry and intact. No rash noted. She is not diaphoretic. Psychiatric: She has a normal mood and affect. Her speech is normal and behavior is normal. Judgment and thought content normal. Cognition and memory are normal.   Nursing note and vitals reviewed.         Diagnostic Study Results     Labs -  Recent Results (from the past 12 hour(s))   EKG, 12 LEAD, INITIAL    Collection Time: 01/26/18  9:32 AM   Result Value Ref Range    Ventricular Rate 77 BPM    Atrial Rate 77 BPM    P-R Interval 142 ms    QRS Duration 76 ms    Q-T Interval 410 ms    QTC Calculation (Bezet) 463 ms    Calculated P Axis 59 degrees    Calculated R Axis 57 degrees    Calculated T Axis 62 degrees    Diagnosis       Normal sinus rhythm  Nonspecific T wave abnormality  Prolonged QT  Abnormal ECG  When compared with ECG of 19-DEC-2017 12:45,  No significant change was found     CBC WITH AUTOMATED DIFF    Collection Time: 01/26/18  9:46 AM   Result Value Ref Range    WBC 7.2 4.6 - 13.2 K/uL    RBC 5.00 4.20 - 5.30 M/uL    HGB 14.8 12.0 - 16.0 g/dL    HCT 44.6 35.0 - 45.0 %    MCV 89.2 74.0 - 97.0 FL    MCH 29.6 24.0 - 34.0 PG    MCHC 33.2 31.0 - 37.0 g/dL    RDW 12.8 11.6 - 14.5 %    PLATELET 567 660 - 420 K/uL    MPV 11.4 9.2 - 11.8 FL    NEUTROPHILS 59 40 - 73 %    LYMPHOCYTES 35 21 - 52 %    MONOCYTES 4 3 - 10 %    EOSINOPHILS 2 0 - 5 %    BASOPHILS 0 0 - 2 %    ABS. NEUTROPHILS 4.2 1.8 - 8.0 K/UL    ABS. LYMPHOCYTES 2.5 0.9 - 3.6 K/UL    ABS. MONOCYTES 0.3 0.05 - 1.2 K/UL    ABS. EOSINOPHILS 0.2 0.0 - 0.4 K/UL    ABS. BASOPHILS 0.0 0.0 - 0.1 K/UL    DF AUTOMATED     METABOLIC PANEL, COMPREHENSIVE    Collection Time: 01/26/18  9:46 AM   Result Value Ref Range    Sodium 141 136 - 145 mmol/L    Potassium 3.7 3.5 - 5.5 mmol/L    Chloride 105 100 - 108 mmol/L    CO2 33 (H) 21 - 32 mmol/L    Anion gap 3 3.0 - 18 mmol/L    Glucose 110 (H) 74 - 99 mg/dL    BUN 10 7.0 - 18 MG/DL    Creatinine 0.88 0.6 - 1.3 MG/DL    BUN/Creatinine ratio 11 (L) 12 - 20      GFR est AA >60 >60 ml/min/1.73m2    GFR est non-AA >60 >60 ml/min/1.73m2    Calcium 8.7 8.5 - 10.1 MG/DL    Bilirubin, total 0.2 0.2 - 1.0 MG/DL    ALT (SGPT) 20 13 - 56 U/L    AST (SGOT) 11 (L) 15 - 37 U/L    Alk.  phosphatase 132 (H) 45 - 117 U/L    Protein, total 7.2 6.4 - 8.2 g/dL    Albumin 3.4 3.4 - 5.0 g/dL    Globulin 3.8 2.0 - 4.0 g/dL    A-G Ratio 0.9 0.8 - 1.7     CARDIAC PANEL,(CK, CKMB & TROPONIN)    Collection Time: 01/26/18  9:46 AM   Result Value Ref Range    CK 44 26 - 192 U/L    CK - MB <1.0 <3.6 ng/ml    CK-MB Index  0.0 - 4.0 %     CALCULATION NOT PERFORMED WHEN RESULT IS BELOW LINEAR LIMIT    Troponin-I, Qt. <0.02 0.0 - 0.045 NG/ML   MAGNESIUM    Collection Time: 01/26/18  9:46 AM   Result Value Ref Range    Magnesium 2.1 1.6 - 2.6 mg/dL       Radiologic Studies -   XR CHEST PORT   Final Result   IMPRESSION  IMPRESSION:     Negative study. Medical Decision Making   I am the first provider for this patient. I reviewed the vital signs, available nursing notes, past medical history, past surgical history, family history and social history. Vital Signs-Reviewed the patient's vital signs. Records Reviewed: Nursing Notes and Old Medical Records (Time of Review: 9:41 AM)    ED Course: Progress Notes, Reevaluation, and Consults:  Labs essentially normal. Magnesium is 2.1. Cardiac enzymes negative. Chest X-Ray showed No acute process. EKG showed NSR with rate of 77 bpm. With no ST elevations or depression and non specific T wave changes. 9:41 AM 1/26/2018    Provider Notes (Medical Decision Making):  MDM  Number of Diagnoses or Management Options  Moderate asthma, unspecified whether complicated, unspecified whether persistent:   Diagnosis management comments: Shortness of breath etiologies include chronic obstructive pulmonary disease (COPD), acute asthma exacerbation, congestive heart failure, pneumonia, acute bronchitis, pulmonary embolism, upper respiratory infection, cardiac event to include acute coronary syndrome, acute myocardial infarction or a combination of the above (ex URI on top of COPD thus causing respiratory distress). Diagnosis       I have reassessed the patient. Patient is feeling better. Patient will be prescribed nebulizers, albuterol inhalers, and Prednisone. Patient was discharged in stable condition. Patient is to return to emergency department if any new or worsening condition. Clinical Impression:   1.  Moderate asthma, unspecified whether complicated, unspecified whether persistent        Disposition: DC    Follow-up Information     Follow up With Details Comments Contact Info    [facility] EMERGENCY DEPT  As needed, If symptoms worsen [address] [place] [address] Zi Armendariz 7  [address], [address] [place] [address] Dany [facility]  [address]   Liset Calvillo 3  [address]  [phone]           _______________________________    Attestations:  Cherie Prater acting as a scribe for and in the presence of Elaine Dupree DO      January 26, 2018 at 9:41 AM       Provider Attestation:      I personally performed the services described in the documentation, reviewed the documentation, as recorded by the scribe in my presence, and it accurately and completely records my words and actions.  January 26, 2018 at 9:41 AM - Elaine Dupree DO    _______________________________    i

## 2018-01-26 NOTE — ED TRIAGE NOTES
Pt reports increased work of breath, productive cough and tightness in her chest. Pt tripodding and speaking in word bursts. Pt states she has COPD and is on 3L O2 at home. Pt also with a hx of CAD, CHF, HTN.

## 2018-01-27 LAB
ATRIAL RATE: 77 BPM
CALCULATED P AXIS, ECG09: 59 DEGREES
CALCULATED R AXIS, ECG10: 57 DEGREES
CALCULATED T AXIS, ECG11: 62 DEGREES
DIAGNOSIS, 93000: NORMAL
P-R INTERVAL, ECG05: 142 MS
Q-T INTERVAL, ECG07: 410 MS
QRS DURATION, ECG06: 76 MS
QTC CALCULATION (BEZET), ECG08: 463 MS
VENTRICULAR RATE, ECG03: 77 BPM

## 2018-02-27 ENCOUNTER — APPOINTMENT (OUTPATIENT)
Dept: GENERAL RADIOLOGY | Age: 53
End: 2018-02-27
Attending: PHYSICIAN ASSISTANT
Payer: MEDICAID

## 2018-02-27 ENCOUNTER — HOSPITAL ENCOUNTER (EMERGENCY)
Age: 53
Discharge: HOME | End: 2018-02-27
Attending: EMERGENCY MEDICINE
Payer: MEDICAID

## 2018-02-27 VITALS
HEART RATE: 75 BPM | TEMPERATURE: 97 F | RESPIRATION RATE: 21 BRPM | DIASTOLIC BLOOD PRESSURE: 90 MMHG | OXYGEN SATURATION: 97 % | SYSTOLIC BLOOD PRESSURE: 180 MMHG

## 2018-02-27 DIAGNOSIS — R07.89 ATYPICAL CHEST PAIN: ICD-10-CM

## 2018-02-27 DIAGNOSIS — R03.0 ELEVATED BLOOD PRESSURE READING: ICD-10-CM

## 2018-02-27 DIAGNOSIS — K43.9 VENTRAL HERNIA WITHOUT OBSTRUCTION OR GANGRENE: Primary | ICD-10-CM

## 2018-02-27 LAB
ALBUMIN SERPL-MCNC: 3.7 G/DL (ref 3.4–5)
ALBUMIN/GLOB SERPL: 1 {RATIO} (ref 0.8–1.7)
ALP SERPL-CCNC: 124 U/L (ref 45–117)
ALT SERPL-CCNC: 17 U/L (ref 13–56)
AMPHET UR QL SCN: NEGATIVE
ANION GAP SERPL CALC-SCNC: 8 MMOL/L (ref 3–18)
AST SERPL-CCNC: 9 U/L (ref 15–37)
ATRIAL RATE: 77 BPM
BARBITURATES UR QL SCN: NEGATIVE
BASOPHILS # BLD: 0 K/UL (ref 0–0.06)
BASOPHILS NFR BLD: 0 % (ref 0–2)
BENZODIAZ UR QL: NEGATIVE
BILIRUB SERPL-MCNC: 0.3 MG/DL (ref 0.2–1)
BUN SERPL-MCNC: 11 MG/DL (ref 7–18)
BUN/CREAT SERPL: 13 (ref 12–20)
CALCIUM SERPL-MCNC: 9.1 MG/DL (ref 8.5–10.1)
CALCULATED P AXIS, ECG09: 67 DEGREES
CALCULATED R AXIS, ECG10: 36 DEGREES
CALCULATED T AXIS, ECG11: 44 DEGREES
CANNABINOIDS UR QL SCN: NEGATIVE
CHLORIDE SERPL-SCNC: 105 MMOL/L (ref 100–108)
CK MB CFR SERPL CALC: NORMAL % (ref 0–4)
CK MB SERPL-MCNC: <1 NG/ML (ref 5–25)
CK SERPL-CCNC: 40 U/L (ref 26–192)
CO2 SERPL-SCNC: 29 MMOL/L (ref 21–32)
COCAINE UR QL SCN: NEGATIVE
CREAT SERPL-MCNC: 0.84 MG/DL (ref 0.6–1.3)
DIAGNOSIS, 93000: NORMAL
DIFFERENTIAL METHOD BLD: NORMAL
EOSINOPHIL # BLD: 0.1 K/UL (ref 0–0.4)
EOSINOPHIL NFR BLD: 1 % (ref 0–5)
ERYTHROCYTE [DISTWIDTH] IN BLOOD BY AUTOMATED COUNT: 12.4 % (ref 11.6–14.5)
ETHANOL SERPL-MCNC: 66 MG/DL (ref 0–3)
GLOBULIN SER CALC-MCNC: 3.7 G/DL (ref 2–4)
GLUCOSE SERPL-MCNC: 136 MG/DL (ref 74–99)
HCT VFR BLD AUTO: 42.9 % (ref 35–45)
HDSCOM,HDSCOM: ABNORMAL
HGB BLD-MCNC: 15 G/DL (ref 12–16)
LACTATE BLD-SCNC: 1.5 MMOL/L (ref 0.4–2)
LIPASE SERPL-CCNC: 87 U/L (ref 73–393)
LYMPHOCYTES # BLD: 2.9 K/UL (ref 0.9–3.6)
LYMPHOCYTES NFR BLD: 40 % (ref 21–52)
MCH RBC QN AUTO: 29.8 PG (ref 24–34)
MCHC RBC AUTO-ENTMCNC: 35 G/DL (ref 31–37)
MCV RBC AUTO: 85.3 FL (ref 74–97)
METHADONE UR QL: NEGATIVE
MONOCYTES # BLD: 0.3 K/UL (ref 0.05–1.2)
MONOCYTES NFR BLD: 4 % (ref 3–10)
NEUTS SEG # BLD: 4 K/UL (ref 1.8–8)
NEUTS SEG NFR BLD: 55 % (ref 40–73)
OPIATES UR QL: POSITIVE
P-R INTERVAL, ECG05: 144 MS
PCP UR QL: NEGATIVE
PLATELET # BLD AUTO: 199 K/UL (ref 135–420)
PMV BLD AUTO: 11 FL (ref 9.2–11.8)
POTASSIUM SERPL-SCNC: 3.1 MMOL/L (ref 3.5–5.5)
PROT SERPL-MCNC: 7.4 G/DL (ref 6.4–8.2)
Q-T INTERVAL, ECG07: 394 MS
QRS DURATION, ECG06: 82 MS
QTC CALCULATION (BEZET), ECG08: 445 MS
RBC # BLD AUTO: 5.03 M/UL (ref 4.2–5.3)
SODIUM SERPL-SCNC: 142 MMOL/L (ref 136–145)
TROPONIN I SERPL-MCNC: <0.02 NG/ML (ref 0–0.04)
VENTRICULAR RATE, ECG03: 77 BPM
WBC # BLD AUTO: 7.3 K/UL (ref 4.6–13.2)

## 2018-02-27 PROCEDURE — 85025 COMPLETE CBC W/AUTO DIFF WBC: CPT | Performed by: PHYSICIAN ASSISTANT

## 2018-02-27 PROCEDURE — 74011250636 HC RX REV CODE- 250/636: Performed by: EMERGENCY MEDICINE

## 2018-02-27 PROCEDURE — 71045 X-RAY EXAM CHEST 1 VIEW: CPT

## 2018-02-27 PROCEDURE — 96361 HYDRATE IV INFUSION ADD-ON: CPT

## 2018-02-27 PROCEDURE — 99284 EMERGENCY DEPT VISIT MOD MDM: CPT

## 2018-02-27 PROCEDURE — 82550 ASSAY OF CK (CPK): CPT | Performed by: PHYSICIAN ASSISTANT

## 2018-02-27 PROCEDURE — 93005 ELECTROCARDIOGRAM TRACING: CPT

## 2018-02-27 PROCEDURE — 96374 THER/PROPH/DIAG INJ IV PUSH: CPT

## 2018-02-27 PROCEDURE — 83605 ASSAY OF LACTIC ACID: CPT

## 2018-02-27 PROCEDURE — 80307 DRUG TEST PRSMV CHEM ANLYZR: CPT | Performed by: PHYSICIAN ASSISTANT

## 2018-02-27 PROCEDURE — 83690 ASSAY OF LIPASE: CPT | Performed by: PHYSICIAN ASSISTANT

## 2018-02-27 PROCEDURE — 80053 COMPREHEN METABOLIC PANEL: CPT | Performed by: PHYSICIAN ASSISTANT

## 2018-02-27 RX ORDER — SODIUM CHLORIDE 9 MG/ML
125 INJECTION, SOLUTION INTRAVENOUS CONTINUOUS
Status: DISCONTINUED | OUTPATIENT
Start: 2018-02-27 | End: 2018-02-27 | Stop reason: HOSPADM

## 2018-02-27 RX ORDER — ONDANSETRON 2 MG/ML
4 INJECTION INTRAMUSCULAR; INTRAVENOUS
Status: COMPLETED | OUTPATIENT
Start: 2018-02-27 | End: 2018-02-27

## 2018-02-27 RX ADMIN — SODIUM CHLORIDE 1000 ML: 900 INJECTION, SOLUTION INTRAVENOUS at 12:39

## 2018-02-27 RX ADMIN — ONDANSETRON HYDROCHLORIDE 4 MG: 2 INJECTION, SOLUTION INTRAMUSCULAR; INTRAVENOUS at 12:39

## 2018-02-27 NOTE — ED NOTES
46 yo F with hx of COPD who was brought in by her cousin due to chest pain and dyspnea x 30 minutes. Pt tried albuterol inhaler without relief. Also notes epigastric abdominal pain, nausea and vomiting. Pt is slow to respond. Notes she took Percocet prior to arrival. Denies Etoh or drug use. No respiratory distress. I performed a brief evaluation, including history and physical, of the patient here in triage and I have determined that pt will need further treatment and evaluation from the main side ER physician. I have placed initial orders to help in expediting patients care.      February 27, 2018 at 11:59 AM - Loco Salazar        Visit Vitals    BP (!) 195/107 (BP 1 Location: Left arm, BP Patient Position: At rest)    Pulse 91    Temp 97 °F (36.1 °C)    Resp 16    SpO2 98%

## 2018-02-27 NOTE — ED PROVIDER NOTES
EMERGENCY DEPARTMENT HISTORY AND PHYSICAL EXAM    12:20 PM      Date: 2/27/2018  Patient Name: Brandon Lambert    History of Presenting Illness     Chief Complaint   Patient presents with    Chest Pain    Abdominal Pain         History Provided By: Patient    Chief Complaint: Abdominal pain  Duration: 6 Hours  Timing:  Constant and Worsening  Location: Midabdomen   Quality: N/A  Severity: Severe  Modifying Factors: No relief with Percocet  Associated Symptoms: nausea and vomiting      Additional History (Context): Brandon Lambert is a 47 y.o. female with hx of HTN, CHF, asthma, COPD, chronic back pain and umbilical hernia presenting to the ED with c/o constant, worsening abdominal pain that began 6 hours ago. Pt reports she woke up this morning to use the bathroom when she noticed her hernia was bulging more than usual. States area is much more painful than usual. Denies any CP, SOB, fever, chills, diarrhea or urinary sx. Associated sx include nausea and vomiting. Severity is severe. Pt took Percocet PTA with no relief. Denies any other drug use. Notes she is followed by a surgeon at [facility], however is unsure if they will repair hernia. No other sx or complaints given at this time. PCP: Shalini Knox MD    Current Outpatient Prescriptions   Medication Sig Dispense Refill    albuterol (PROAIR HFA) 90 mcg/actuation inhaler Take 2 Puffs by inhalation every four (4) hours as needed for Wheezing. 1 Inhaler 0    albuterol (PROVENTIL VENTOLIN) 2.5 mg /3 mL (0.083 %) nebulizer solution 3 mL by Nebulization route every four (4) hours as needed for Wheezing. 30 Each 0    ALPRAZolam (XANAX) 1 mg tablet Take 1 Tab by mouth two (2) times a day. Max Daily Amount: 2 mg. 20 Tab 0    oxyCODONE-acetaminophen (PERCOCET 10)  mg per tablet Take 1 Tab by mouth every eight (8) hours as needed. Max Daily Amount: 3 Tabs. 20 Tab 0    amLODIPine (NORVASC) 10 mg tablet Take 1 Tab by mouth daily.  30 Tab 1    budesonide-formoterol (SYMBICORT) 160-4.5 mcg/actuation HFA inhaler Take 2 Puffs by inhalation two (2) times a day. 1 Inhaler 1    bumetanide (BUMEX) 0.5 mg tablet Take 1 Tab by mouth every fourty-eight (48) hours. 30 Tab 1    famotidine (PEPCID) 20 mg tablet Take 1 Tab by mouth daily. 30 Tab 1    FLUoxetine (PROZAC) 20 mg capsule Take 3 Caps by mouth daily. 90 Cap 1    montelukast (SINGULAIR) 10 mg tablet Take 1 Tab by mouth nightly. 30 Tab 1    tiotropium (SPIRIVA) 18 mcg inhalation capsule Take 1 Cap by inhalation daily. 30 Cap 1    arformoterol (BROVANA) 15 mcg/2 mL nebu neb solution 2 mL by Nebulization route two (2) times a day. 60 Vial 1    budesonide (PULMICORT) 0.5 mg/2 mL nbsp 2 mL by Nebulization route two (2) times a day. 60 Each 1    hydrALAZINE (APRESOLINE) 50 mg tablet Take 1 Tab by mouth three (3) times daily. 80 Tab 1       Past History     Past Medical History:  Past Medical History:   Diagnosis Date    Asthma     CHF (congestive heart failure) (HCC)     Chronic obstructive pulmonary disease (HCC)     Endocrine disease     thyroid issues    Gastrointestinal disorder     \"blockage in my stomach\"    Hypertension        Past Surgical History:  No past surgical history on file. Family History:  No family history on file. Social History:  Social History   Substance Use Topics    Smoking status: Never Smoker    Smokeless tobacco: Never Used    Alcohol use No      Comment: socially       Allergies:  No Known Allergies      Review of Systems       Review of Systems   Constitutional: Negative for activity change, fatigue and fever. HENT: Negative for congestion and rhinorrhea. Eyes: Negative for visual disturbance. Respiratory: Negative for shortness of breath. Cardiovascular: Negative for chest pain and palpitations. Gastrointestinal: Positive for abdominal pain, nausea and vomiting. Negative for diarrhea. Genitourinary: Negative for dysuria and hematuria. Musculoskeletal: Negative for back pain. Skin: Negative for rash. Neurological: Negative for dizziness, weakness and light-headedness. All other systems reviewed and are negative. Physical Exam     Visit Vitals    /90    Pulse 75    Temp 97 °F (36.1 °C)    Resp 21    SpO2 97%         Physical Exam   Constitutional: She is oriented to person, place, and time. She appears well-developed and well-nourished. No distress. Drowsy but oriented    HENT:   Head: Normocephalic and atraumatic. Right Ear: External ear normal.   Left Ear: External ear normal.   Nose: Nose normal.   Mouth/Throat: Oropharynx is clear and moist.   Eyes: Conjunctivae and EOM are normal. No scleral icterus. Pupils 3mm and reactive    Neck: Normal range of motion. Neck supple. No JVD present. No tracheal deviation present. No thyromegaly present. Cardiovascular: Normal rate, regular rhythm, normal heart sounds and intact distal pulses. Exam reveals no gallop and no friction rub. No murmur heard. Pulmonary/Chest: Effort normal and breath sounds normal. She exhibits no tenderness. Abdominal: Soft. Bowel sounds are normal. She exhibits no distension. There is tenderness. There is no rebound and no guarding. Large periumbilical hernia that is tender, hard to palpation    Musculoskeletal: Normal range of motion. She exhibits no edema or tenderness. Lymphadenopathy:     She has no cervical adenopathy. Neurological: She is oriented to person, place, and time. No cranial nerve deficit. Coordination normal.   Drowsy but oriented x 4, follows commands    Skin: Skin is warm and dry. Psychiatric: Her behavior is normal. Judgment and thought content normal.   Nursing note and vitals reviewed. Diagnostic Study Results     Labs -  No results found for this or any previous visit (from the past 12 hour(s)).     Radiologic Studies -   XR CHEST PORT   Final Result            Medical Decision Making   I am the first provider for this patient. I reviewed the vital signs, available nursing notes, past medical history, past surgical history, family history and social history. Vital Signs-Reviewed the patient's vital signs. Pulse Oximetry Analysis -  98% on room air, stable     Cardiac Monitor:  Rate: 91  Rhythm:  Normal Sinus Rhythm     EKG: Interpreted by the EP. Time Interpreted: 12:06 PM   Rate: 77    Rhythm: Normal Sinus Rhythm    Interpretation: No STEMI. Records Reviewed: Nursing Notes and Old Medical Records (Time of Review: 12:20 PM)    ED Course: Progress Notes, Reevaluation, and Consults:    1:34 PM: Pt feeling much better after reduction of hernia. Pt is now requesting to go home, she is ready for discharge. Lactate normal. Pt is to f/u with Dr. Asim Nuno, surgery. Provider Notes (Medical Decision Making): Pt is a 51yo female with a hx of polysubstance abuse, recurrent infections, chronic pain, asthma, HTN, CHF, abdominal surgeries with known hernia presents with chest pain and abdominal pain with nausea. Pt appears to have taken opiates with some drowsiness but no apnea and had a hard tender ventral hernia. I was able to reduce the hernia on exam.  Lactate is reassuring. Will hydrate, follow cardiac labs, nausea control, CXR then reevaluate. Yusef Trevino DO 12:36 PM      Diagnosis     Clinical Impression:   1. Ventral hernia without obstruction or gangrene    2.  Atypical chest pain        Disposition: Discharge     Follow-up Information     Follow up With Details Comments [address], [place] Call in 1 day  [facility] [address] [facility] EMERGENCY DEPT  As needed, If symptoms worsen [address] [place] [id number]  [phone]           Discharge Medication List as of 2/27/2018  1:37 PM      CONTINUE these medications which have NOT CHANGED    Details   albuterol (PROAIR HFA) 90 mcg/actuation inhaler Take 2 Puffs by inhalation every four (4) hours as needed for Wheezing., Print, Disp-1 Inhaler, R-0      albuterol (PROVENTIL VENTOLIN) 2.5 mg /3 mL (0.083 %) nebulizer solution 3 mL by Nebulization route every four (4) hours as needed for Wheezing., Print, Disp-30 Each, R-0      ALPRAZolam (XANAX) 1 mg tablet Take 1 Tab by mouth two (2) times a day. Max Daily Amount: 2 mg., Print, Disp-20 Tab, R-0      oxyCODONE-acetaminophen (PERCOCET 10)  mg per tablet Take 1 Tab by mouth every eight (8) hours as needed. Max Daily Amount: 3 Tabs., Print, Disp-20 Tab, R-0      amLODIPine (NORVASC) 10 mg tablet Take 1 Tab by mouth daily. , Print, Disp-30 Tab, R-1      budesonide-formoterol (SYMBICORT) 160-4.5 mcg/actuation HFA inhaler Take 2 Puffs by inhalation two (2) times a day., Print, Disp-1 Inhaler, R-1      bumetanide (BUMEX) 0.5 mg tablet Take 1 Tab by mouth every fourty-eight (48) hours. , Print, Disp-30 Tab, R-1      famotidine (PEPCID) 20 mg tablet Take 1 Tab by mouth daily. , Print, Disp-30 Tab, R-1      FLUoxetine (PROZAC) 20 mg capsule Take 3 Caps by mouth daily. , Print, Disp-90 Cap, R-1      montelukast (SINGULAIR) 10 mg tablet Take 1 Tab by mouth nightly. , Print, Disp-30 Tab, R-1      tiotropium (SPIRIVA) 18 mcg inhalation capsule Take 1 Cap by inhalation daily. , Print, Disp-30 Cap, R-1      arformoterol (BROVANA) 15 mcg/2 mL nebu neb solution 2 mL by Nebulization route two (2) times a day., Print, Disp-60 Vial, R-1      budesonide (PULMICORT) 0.5 mg/2 mL nbsp 2 mL by Nebulization route two (2) times a day., Print, Disp-60 Each, R-1      hydrALAZINE (APRESOLINE) 50 mg tablet Take 1 Tab by mouth three (3) times daily. , Print, Disp-98 Tab, R-1           _______________________________    Attestations:  [employer] [address] [place] [address] acting as a scribe for and in the presence of Grant Law MD      February 27, 2018 at 12:20 PM       Provider Attestation:      I personally performed the services described in the documentation, reviewed the documentation, as recorded by the scribe in my presence, and it accurately and completely records my words and actions.  February 27, 2018 at 12:20 PM - Ellen Mixon MD    _______________________________

## 2018-02-27 NOTE — ED NOTES
Patient stated, \"I feel much better after pushing the hernia in. I have a lot to do today and my daughter is outside to pick me up. I need to leave now. \"

## 2018-08-23 ENCOUNTER — HOSPITAL ENCOUNTER (EMERGENCY)
Age: 53
Discharge: HOME | End: 2018-08-23
Attending: EMERGENCY MEDICINE
Payer: MEDICAID

## 2018-08-23 VITALS — HEART RATE: 80 BPM | RESPIRATION RATE: 16 BRPM

## 2018-08-23 DIAGNOSIS — J45.21 MILD INTERMITTENT ASTHMA WITH ACUTE EXACERBATION: ICD-10-CM

## 2018-08-23 DIAGNOSIS — R06.02 SOB (SHORTNESS OF BREATH): Primary | ICD-10-CM

## 2018-08-23 PROCEDURE — 94640 AIRWAY INHALATION TREATMENT: CPT

## 2018-08-23 PROCEDURE — 93005 ELECTROCARDIOGRAM TRACING: CPT

## 2018-08-23 PROCEDURE — 74011000250 HC RX REV CODE- 250: Performed by: EMERGENCY MEDICINE

## 2018-08-23 PROCEDURE — 99285 EMERGENCY DEPT VISIT HI MDM: CPT

## 2018-08-23 PROCEDURE — 74011250636 HC RX REV CODE- 250/636: Performed by: EMERGENCY MEDICINE

## 2018-08-23 PROCEDURE — 77030029684 HC NEB SM VOL KT MONA -A

## 2018-08-23 RX ORDER — ALBUTEROL SULFATE 90 UG/1
2 AEROSOL, METERED RESPIRATORY (INHALATION)
Qty: 1 INHALER | Refills: 0 | Status: ON HOLD | OUTPATIENT
Start: 2018-08-23 | End: 2019-03-14 | Stop reason: SDUPTHER

## 2018-08-23 RX ORDER — DEXAMETHASONE SODIUM PHOSPHATE 4 MG/ML
10 INJECTION, SOLUTION INTRA-ARTICULAR; INTRALESIONAL; INTRAMUSCULAR; INTRAVENOUS; SOFT TISSUE
Status: COMPLETED | OUTPATIENT
Start: 2018-08-23 | End: 2018-08-23

## 2018-08-23 RX ORDER — ALBUTEROL SULFATE 90 UG/1
2 AEROSOL, METERED RESPIRATORY (INHALATION)
Qty: 1 INHALER | Refills: 0 | Status: SHIPPED | OUTPATIENT
Start: 2018-08-23 | End: 2018-08-23

## 2018-08-23 RX ORDER — BUDESONIDE 0.5 MG/2ML
500 INHALANT ORAL 2 TIMES DAILY
Qty: 60 EACH | Refills: 1 | Status: ON HOLD | OUTPATIENT
Start: 2018-08-23 | End: 2019-03-14 | Stop reason: SDUPTHER

## 2018-08-23 RX ORDER — IPRATROPIUM BROMIDE AND ALBUTEROL SULFATE 2.5; .5 MG/3ML; MG/3ML
3 SOLUTION RESPIRATORY (INHALATION) ONCE
Status: COMPLETED | OUTPATIENT
Start: 2018-08-23 | End: 2018-08-23

## 2018-08-23 RX ORDER — BUDESONIDE AND FORMOTEROL FUMARATE DIHYDRATE 160; 4.5 UG/1; UG/1
2 AEROSOL RESPIRATORY (INHALATION) 2 TIMES DAILY
Qty: 1 INHALER | Refills: 1 | Status: SHIPPED | OUTPATIENT
Start: 2018-08-23 | End: 2020-03-26

## 2018-08-23 RX ADMIN — IPRATROPIUM BROMIDE AND ALBUTEROL SULFATE 3 ML: .5; 3 SOLUTION RESPIRATORY (INHALATION) at 10:42

## 2018-08-23 RX ADMIN — DEXAMETHASONE SODIUM PHOSPHATE 10 MG: 4 INJECTION, SOLUTION INTRAMUSCULAR; INTRAVENOUS at 10:42
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
## 2018-08-23 NOTE — DISCHARGE INSTRUCTIONS
Asthma Attack: Care Instructions  Your Care Instructions    During an asthma attack, the airways swell and narrow. This makes it hard to breathe. Severe asthma attacks can be life-threatening, but you can help prevent them by keeping your asthma under control and treating symptoms before they get bad. Symptoms include being short of breath, having chest tightness, coughing, and wheezing. Noting and treating these symptoms can also help you avoid future trips to the emergency room. The doctor has checked you carefully, but problems can develop later. If you notice any problems or new symptoms, get medical treatment right away. Follow-up care is a key part of your treatment and safety. Be sure to make and go to all appointments, and call your doctor if you are having problems. It's also a good idea to know your test results and keep a list of the medicines you take. How can you care for yourself at home? · Follow your asthma action plan to prevent and treat attacks. If you don't have an asthma action plan, work with your doctor to create one. · Take your asthma medicines exactly as prescribed. Talk to your doctor right away if you have any questions about how to take them. ¨ Use your quick-relief medicine when you have symptoms of an attack. Quick-relief medicine is usually an albuterol inhaler. Some people need to use quick-relief medicine before they exercise. ¨ Take your controller medicine every day, not just when you have symptoms. Controller medicine is usually an inhaled corticosteroid. The goal is to prevent problems before they occur. Don't use your controller medicine to treat an attack that has already started. It doesn't work fast enough to help. ¨ If your doctor prescribed corticosteroid pills to use during an attack, take them exactly as prescribed. It may take hours for the pills to work, but they may make the episode shorter and help you breathe better.   ¨ Keep your quick-relief medicine with you at all times. · Talk to your doctor before using other medicines. Some medicines, such as aspirin, can cause asthma attacks in some people. · If you have a peak flow meter, use it to check how well you are breathing. This can help you predict when an asthma attack is going to occur. Then you can take medicine to prevent the asthma attack or make it less severe. · Do not smoke or allow others to smoke around you. Avoid smoky places. Smoking makes asthma worse. If you need help quitting, talk to your doctor about stop-smoking programs and medicines. These can increase your chances of quitting for good. · Learn what triggers an asthma attack for you, and avoid the triggers when you can. Common triggers include colds, smoke, air pollution, dust, pollen, mold, pets, cockroaches, stress, and cold air. · Avoid colds and the flu. Get a pneumococcal vaccine shot. If you have had one before, ask your doctor if you need a second dose. Get a flu vaccine every fall. If you must be around people with colds or the flu, wash your hands often. When should you call for help? Call 911 anytime you think you may need emergency care. For example, call if:    · You have severe trouble breathing.    Call your doctor now or seek immediate medical care if:    · Your symptoms do not get better after you have followed your asthma action plan.     · You have new or worse trouble breathing.     · Your coughing and wheezing get worse.     · You cough up dark brown or bloody mucus (sputum).     · You have a new or higher fever.    Watch closely for changes in your health, and be sure to contact your doctor if:    · You need to use quick-relief medicine on more than 2 days a week (unless it is just for exercise).     · You cough more deeply or more often, especially if you notice more mucus or a change in the color of your mucus.     · You are not getting better as expected. Where can you learn more?   Go to http://magda-kadie.info/. Enter A885 in the search box to learn more about \"Asthma Attack: Care Instructions. \"  Current as of: December 6, 2017  Content Version: 11.7  © 5420-8299 [employer]. Care instructions adapted under license by [employer] (which disclaims liability or warranty for this information). If you have questions about a medical condition or this instruction, always ask your healthcare professional. [address] any warranty or liability for your use of this information. Learning About Asthma Triggers  What are asthma triggers? When you have asthma, certain things can make your symptoms worse. These are called triggers. Learn what triggers an asthma attack for you, and avoid the triggers when you can. Common triggers include colds, smoke, air pollution, dust, pollen, pets, stress, and cold air. How do asthma triggers affect you? Triggers can make it harder for your lungs to work as they should. They can lead to sudden breathing problems and other symptoms. When you are around a trigger, an asthma attack is more likely. If your symptoms are severe, you may need emergency treatment or have to go to the hospital for treatment. What can you do to avoid triggers? The first thing is to know your triggers. When you are having symptoms, note the things around you that might be causing them. Then look for patterns that may be triggering your symptoms. Record your triggers on a piece of paper or in an asthma diary. When you have your list of possible triggers, work with your doctor to find ways to avoid them. Avoid colds and flu. Get a pneumococcal vaccine shot. If you have had one before, ask your doctor whether you need a second dose. Get a flu vaccine every year, as soon as it's available. If you must be around people with colds or the flu, wash your hands often. Here are some ways to avoid a few common triggers.   · Do not smoke or allow others to smoke around you. If you need help quitting, talk to your doctor about stop-smoking programs and medicines. These can increase your chances of quitting for good. · If there is a lot of pollution, pollen, or dust outside, stay at home and keep your windows closed. Use an air conditioner or air filter in your home. Check your local weather report or newspaper for air quality and pollen reports. What else should you know? · Take your controller medicine every day, not just when you have symptoms. It helps prevent problems before they occur. · Your doctor may suggest that you check how well your lungs are working by measuring your peak expiratory flow (PEF) throughout the day. Your PEF may drop when you are near things that trigger symptoms. Where can you learn more? Go to http://magda-kadie.info/. Enter U812 in the search box to learn more about \"Learning About Asthma Triggers. \"  Current as of: December 6, 2017  Content Version: 11.7  © 2587-3091 [employer]. Care instructions adapted under license by [employer] (which disclaims liability or warranty for this information). If you have questions about a medical condition or this instruction, always ask your healthcare professional. [place] [address] any warranty or liability for your use of this information.

## 2018-08-23 NOTE — ED TRIAGE NOTES
Pt arrived via EMS with c/o shortness of breath. Pt states she got in an argument with her children and felt like she couldn't breathe.  Rescue administered 1 combi treatment and 1 albuterol prior to arrival.

## 2018-08-24 LAB
ATRIAL RATE: 76 BPM
CALCULATED P AXIS, ECG09: 74 DEGREES
CALCULATED R AXIS, ECG10: 56 DEGREES
CALCULATED T AXIS, ECG11: 79 DEGREES
DIAGNOSIS, 93000: NORMAL
P-R INTERVAL, ECG05: 120 MS
Q-T INTERVAL, ECG07: 398 MS
QRS DURATION, ECG06: 78 MS
QTC CALCULATION (BEZET), ECG08: 447 MS
VENTRICULAR RATE, ECG03: 76 BPM

## 2019-02-19 NOTE — ED NOTES
Patient provided with diabetic diet tray. No other needs at this time. Asc Procedure Text (C): After obtaining clear surgical margins the patient was sent to an ASC for surgical repair.  The patient understands they will receive post-surgical care and follow-up from the ASC physician.

## 2019-03-08 ENCOUNTER — HOSPITAL ENCOUNTER (INPATIENT)
Age: 54
LOS: 5 days | Discharge: HOME | DRG: 140 | End: 2019-03-14
Attending: EMERGENCY MEDICINE | Admitting: HOSPITALIST
Payer: MEDICAID

## 2019-03-08 DIAGNOSIS — R07.1 CHEST PAIN ON BREATHING: ICD-10-CM

## 2019-03-08 DIAGNOSIS — N95.0 POST-MENOPAUSAL BLEEDING: ICD-10-CM

## 2019-03-08 DIAGNOSIS — J44.1 COPD EXACERBATION (HCC): Primary | ICD-10-CM

## 2019-03-08 DIAGNOSIS — I10 HYPERTENSION, UNSPECIFIED TYPE: ICD-10-CM

## 2019-03-08 DIAGNOSIS — R94.31 EKG ABNORMALITIES: ICD-10-CM

## 2019-03-08 LAB
ALBUMIN SERPL-MCNC: 3.7 G/DL (ref 3.4–5)
ALBUMIN/GLOB SERPL: 1.2 {RATIO} (ref 0.8–1.7)
ALP SERPL-CCNC: 116 U/L (ref 45–117)
ALT SERPL-CCNC: 19 U/L (ref 13–56)
ANION GAP SERPL CALC-SCNC: 5 MMOL/L (ref 3–18)
ARTERIAL PATENCY WRIST A: YES
AST SERPL-CCNC: 14 U/L (ref 15–37)
BASE DEFICIT BLD-SCNC: 1 MMOL/L
BASOPHILS # BLD: 0 K/UL (ref 0–0.1)
BASOPHILS NFR BLD: 0 % (ref 0–2)
BDY SITE: ABNORMAL
BILIRUB SERPL-MCNC: 0.3 MG/DL (ref 0.2–1)
BODY TEMPERATURE: 98.6
BUN SERPL-MCNC: 15 MG/DL (ref 7–18)
BUN/CREAT SERPL: 14 (ref 12–20)
CALCIUM SERPL-MCNC: 8.5 MG/DL (ref 8.5–10.1)
CHLORIDE SERPL-SCNC: 109 MMOL/L (ref 100–108)
CK MB CFR SERPL CALC: 3.7 % (ref 0–4)
CK MB CFR SERPL CALC: 3.8 % (ref 0–4)
CK MB SERPL-MCNC: 3.7 NG/ML (ref 5–25)
CK MB SERPL-MCNC: 4.3 NG/ML (ref 5–25)
CK SERPL-CCNC: 100 U/L (ref 26–192)
CK SERPL-CCNC: 112 U/L (ref 26–192)
CO2 SERPL-SCNC: 31 MMOL/L (ref 21–32)
CREAT SERPL-MCNC: 1.1 MG/DL (ref 0.6–1.3)
DIFFERENTIAL METHOD BLD: NORMAL
EOSINOPHIL # BLD: 0.1 K/UL (ref 0–0.4)
EOSINOPHIL NFR BLD: 1 % (ref 0–5)
ERYTHROCYTE [DISTWIDTH] IN BLOOD BY AUTOMATED COUNT: 13.6 % (ref 11.6–14.5)
GAS FLOW.O2 O2 DELIVERY SYS: ABNORMAL L/MIN
GLOBULIN SER CALC-MCNC: 3.2 G/DL (ref 2–4)
GLUCOSE SERPL-MCNC: 104 MG/DL (ref 74–99)
HCO3 BLD-SCNC: 26.1 MMOL/L (ref 22–26)
HCT VFR BLD AUTO: 43.1 % (ref 35–45)
HGB BLD-MCNC: 14.1 G/DL (ref 12–16)
LYMPHOCYTES # BLD: 2.8 K/UL (ref 0.9–3.6)
LYMPHOCYTES NFR BLD: 39 % (ref 21–52)
MCH RBC QN AUTO: 29 PG (ref 24–34)
MCHC RBC AUTO-ENTMCNC: 32.7 G/DL (ref 31–37)
MCV RBC AUTO: 88.7 FL (ref 74–97)
MONOCYTES # BLD: 0.5 K/UL (ref 0.05–1.2)
MONOCYTES NFR BLD: 7 % (ref 3–10)
NEUTS SEG # BLD: 3.8 K/UL (ref 1.8–8)
NEUTS SEG NFR BLD: 53 % (ref 40–73)
O2/TOTAL GAS SETTING VFR VENT: 21 %
PCO2 BLD: 49.4 MMHG (ref 35–45)
PH BLD: 7.33 [PH] (ref 7.35–7.45)
PLATELET # BLD AUTO: 246 K/UL (ref 135–420)
PMV BLD AUTO: 10.6 FL (ref 9.2–11.8)
PO2 BLD: 71 MMHG (ref 80–100)
POTASSIUM SERPL-SCNC: 4.1 MMOL/L (ref 3.5–5.5)
PROT SERPL-MCNC: 6.9 G/DL (ref 6.4–8.2)
RBC # BLD AUTO: 4.86 M/UL (ref 4.2–5.3)
SAO2 % BLD: 93 % (ref 92–97)
SERVICE CMNT-IMP: ABNORMAL
SODIUM SERPL-SCNC: 145 MMOL/L (ref 136–145)
SPECIMEN TYPE: ABNORMAL
TOTAL RESP. RATE, ITRR: 20
TROPONIN I SERPL-MCNC: 0.04 NG/ML (ref 0–0.04)
TROPONIN I SERPL-MCNC: 0.04 NG/ML (ref 0–0.04)
WBC # BLD AUTO: 7.2 K/UL (ref 4.6–13.2)

## 2019-03-08 PROCEDURE — 94660 CPAP INITIATION&MGMT: CPT

## 2019-03-08 PROCEDURE — 94640 AIRWAY INHALATION TREATMENT: CPT

## 2019-03-08 PROCEDURE — 77030013033 HC MSK BPAP/CPAP MMKA -B

## 2019-03-08 PROCEDURE — 93005 ELECTROCARDIOGRAM TRACING: CPT

## 2019-03-08 PROCEDURE — 74011250636 HC RX REV CODE- 250/636

## 2019-03-08 PROCEDURE — 74011250636 HC RX REV CODE- 250/636: Performed by: STUDENT IN AN ORGANIZED HEALTH CARE EDUCATION/TRAINING PROGRAM

## 2019-03-08 PROCEDURE — 96375 TX/PRO/DX INJ NEW DRUG ADDON: CPT

## 2019-03-08 PROCEDURE — 99285 EMERGENCY DEPT VISIT HI MDM: CPT

## 2019-03-08 PROCEDURE — 36600 WITHDRAWAL OF ARTERIAL BLOOD: CPT

## 2019-03-08 PROCEDURE — 80053 COMPREHEN METABOLIC PANEL: CPT

## 2019-03-08 PROCEDURE — 74011000250 HC RX REV CODE- 250: Performed by: STUDENT IN AN ORGANIZED HEALTH CARE EDUCATION/TRAINING PROGRAM

## 2019-03-08 PROCEDURE — 96365 THER/PROPH/DIAG IV INF INIT: CPT

## 2019-03-08 PROCEDURE — 85025 COMPLETE CBC W/AUTO DIFF WBC: CPT

## 2019-03-08 PROCEDURE — 5A09457 ASSISTANCE WITH RESPIRATORY VENTILATION, 24-96 CONSECUTIVE HOURS, CONTINUOUS POSITIVE AIRWAY PRESSURE: ICD-10-PCS | Performed by: EMERGENCY MEDICINE

## 2019-03-08 PROCEDURE — 82550 ASSAY OF CK (CPK): CPT

## 2019-03-08 PROCEDURE — 82803 BLOOD GASES ANY COMBINATION: CPT

## 2019-03-08 RX ORDER — MAGNESIUM SULFATE HEPTAHYDRATE 40 MG/ML
2 INJECTION, SOLUTION INTRAVENOUS
Status: COMPLETED | OUTPATIENT
Start: 2019-03-08 | End: 2019-03-08

## 2019-03-08 RX ORDER — ALBUTEROL SULFATE 0.83 MG/ML
5 SOLUTION RESPIRATORY (INHALATION)
Status: COMPLETED | OUTPATIENT
Start: 2019-03-08 | End: 2019-03-08

## 2019-03-08 RX ORDER — IPRATROPIUM BROMIDE AND ALBUTEROL SULFATE 2.5; .5 MG/3ML; MG/3ML
3 SOLUTION RESPIRATORY (INHALATION)
Status: COMPLETED | OUTPATIENT
Start: 2019-03-08 | End: 2019-03-08

## 2019-03-08 RX ORDER — ONDANSETRON 2 MG/ML
4 INJECTION INTRAMUSCULAR; INTRAVENOUS
Status: COMPLETED | OUTPATIENT
Start: 2019-03-08 | End: 2019-03-08

## 2019-03-08 RX ORDER — ONDANSETRON 2 MG/ML
INJECTION INTRAMUSCULAR; INTRAVENOUS
Status: COMPLETED
Start: 2019-03-08 | End: 2019-03-08

## 2019-03-08 RX ORDER — MORPHINE SULFATE 4 MG/ML
4 INJECTION, SOLUTION INTRAMUSCULAR; INTRAVENOUS
Status: COMPLETED | OUTPATIENT
Start: 2019-03-08 | End: 2019-03-08

## 2019-03-08 RX ORDER — GUAIFENESIN 100 MG/5ML
324 LIQUID (ML) ORAL
Status: COMPLETED | OUTPATIENT
Start: 2019-03-08 | End: 2019-03-09

## 2019-03-08 RX ORDER — MORPHINE SULFATE 4 MG/ML
INJECTION INTRAVENOUS
Status: COMPLETED
Start: 2019-03-08 | End: 2019-03-08

## 2019-03-08 RX ORDER — ACETAMINOPHEN 500 MG
1000 TABLET ORAL
Status: DISPENSED | OUTPATIENT
Start: 2019-03-08 | End: 2019-03-09

## 2019-03-08 RX ADMIN — METHYLPREDNISOLONE SODIUM SUCCINATE 125 MG: 125 INJECTION, POWDER, FOR SOLUTION INTRAMUSCULAR; INTRAVENOUS at 19:34

## 2019-03-08 RX ADMIN — MAGNESIUM SULFATE HEPTAHYDRATE 2 G: 40 INJECTION, SOLUTION INTRAVENOUS at 19:39

## 2019-03-08 RX ADMIN — IPRATROPIUM BROMIDE AND ALBUTEROL SULFATE 3 ML: .5; 3 SOLUTION RESPIRATORY (INHALATION) at 19:34

## 2019-03-08 RX ADMIN — IPRATROPIUM BROMIDE AND ALBUTEROL SULFATE 3 ML: .5; 3 SOLUTION RESPIRATORY (INHALATION) at 19:40

## 2019-03-08 RX ADMIN — ONDANSETRON 4 MG: 2 INJECTION INTRAMUSCULAR; INTRAVENOUS at 22:30

## 2019-03-08 RX ADMIN — IPRATROPIUM BROMIDE AND ALBUTEROL SULFATE 3 ML: .5; 3 SOLUTION RESPIRATORY (INHALATION) at 21:33

## 2019-03-08 RX ADMIN — ALBUTEROL SULFATE 5 MG: 2.5 SOLUTION RESPIRATORY (INHALATION) at 21:46

## 2019-03-08 RX ADMIN — MORPHINE SULFATE 4 MG: 4 INJECTION, SOLUTION INTRAMUSCULAR; INTRAVENOUS at 22:09

## 2019-03-08 RX ADMIN — MORPHINE SULFATE 4 MG: 4 INJECTION INTRAVENOUS at 22:09

## 2019-03-09 ENCOUNTER — APPOINTMENT (OUTPATIENT)
Dept: GENERAL RADIOLOGY | Age: 54
DRG: 140 | End: 2019-03-09
Attending: INTERNAL MEDICINE
Payer: MEDICAID

## 2019-03-09 PROBLEM — J44.0 ACUTE BRONCHITIS WITH CHRONIC OBSTRUCTIVE PULMONARY DISEASE (COPD) (HCC): Status: ACTIVE | Noted: 2019-03-09

## 2019-03-09 PROBLEM — R94.31 T WAVE INVERSION IN EKG: Status: ACTIVE | Noted: 2019-03-09

## 2019-03-09 PROBLEM — J20.9 ACUTE BRONCHITIS WITH CHRONIC OBSTRUCTIVE PULMONARY DISEASE (COPD) (HCC): Status: ACTIVE | Noted: 2019-03-09

## 2019-03-09 LAB
APTT PPP: 26.7 SEC (ref 23–36.4)
APTT PPP: 52.5 SEC (ref 23–36.4)
APTT PPP: 68.6 SEC (ref 23–36.4)
ATRIAL RATE: 74 BPM
ATRIAL RATE: 80 BPM
ATRIAL RATE: 81 BPM
BASOPHILS # BLD: 0 K/UL (ref 0–0.1)
BASOPHILS NFR BLD: 0 % (ref 0–2)
BNP SERPL-MCNC: 710 PG/ML (ref 0–900)
CALCULATED P AXIS, ECG09: 54 DEGREES
CALCULATED P AXIS, ECG09: 55 DEGREES
CALCULATED P AXIS, ECG09: 87 DEGREES
CALCULATED R AXIS, ECG10: 37 DEGREES
CALCULATED R AXIS, ECG10: 42 DEGREES
CALCULATED R AXIS, ECG10: 96 DEGREES
CALCULATED T AXIS, ECG11: -49 DEGREES
CALCULATED T AXIS, ECG11: -89 DEGREES
CALCULATED T AXIS, ECG11: 1 DEGREES
DIAGNOSIS, 93000: NORMAL
DIFFERENTIAL METHOD BLD: ABNORMAL
EOSINOPHIL # BLD: 0 K/UL (ref 0–0.4)
EOSINOPHIL NFR BLD: 0 % (ref 0–5)
ERYTHROCYTE [DISTWIDTH] IN BLOOD BY AUTOMATED COUNT: 13.6 % (ref 11.6–14.5)
HCT VFR BLD AUTO: 42.3 % (ref 35–45)
HGB BLD-MCNC: 13.6 G/DL (ref 12–16)
LYMPHOCYTES # BLD: 1.2 K/UL (ref 0.9–3.6)
LYMPHOCYTES NFR BLD: 19 % (ref 21–52)
MCH RBC QN AUTO: 28.8 PG (ref 24–34)
MCHC RBC AUTO-ENTMCNC: 32.2 G/DL (ref 31–37)
MCV RBC AUTO: 89.6 FL (ref 74–97)
MONOCYTES # BLD: 0 K/UL (ref 0.05–1.2)
MONOCYTES NFR BLD: 0 % (ref 3–10)
NEUTS SEG # BLD: 5.2 K/UL (ref 1.8–8)
NEUTS SEG NFR BLD: 81 % (ref 40–73)
P-R INTERVAL, ECG05: 122 MS
P-R INTERVAL, ECG05: 126 MS
P-R INTERVAL, ECG05: 132 MS
PLATELET # BLD AUTO: 252 K/UL (ref 135–420)
PMV BLD AUTO: 11.4 FL (ref 9.2–11.8)
Q-T INTERVAL, ECG07: 392 MS
Q-T INTERVAL, ECG07: 404 MS
Q-T INTERVAL, ECG07: 444 MS
QRS DURATION, ECG06: 76 MS
QRS DURATION, ECG06: 76 MS
QRS DURATION, ECG06: 84 MS
QTC CALCULATION (BEZET), ECG08: 455 MS
QTC CALCULATION (BEZET), ECG08: 465 MS
QTC CALCULATION (BEZET), ECG08: 492 MS
RBC # BLD AUTO: 4.72 M/UL (ref 4.2–5.3)
VENTRICULAR RATE, ECG03: 74 BPM
VENTRICULAR RATE, ECG03: 80 BPM
VENTRICULAR RATE, ECG03: 81 BPM
WBC # BLD AUTO: 6.4 K/UL (ref 4.6–13.2)

## 2019-03-09 PROCEDURE — 83880 ASSAY OF NATRIURETIC PEPTIDE: CPT

## 2019-03-09 PROCEDURE — 71045 X-RAY EXAM CHEST 1 VIEW: CPT

## 2019-03-09 PROCEDURE — 94640 AIRWAY INHALATION TREATMENT: CPT

## 2019-03-09 PROCEDURE — 74011250637 HC RX REV CODE- 250/637: Performed by: INTERNAL MEDICINE

## 2019-03-09 PROCEDURE — 36600 WITHDRAWAL OF ARTERIAL BLOOD: CPT

## 2019-03-09 PROCEDURE — 94761 N-INVAS EAR/PLS OXIMETRY MLT: CPT

## 2019-03-09 PROCEDURE — 85025 COMPLETE CBC W/AUTO DIFF WBC: CPT

## 2019-03-09 PROCEDURE — 85730 THROMBOPLASTIN TIME PARTIAL: CPT

## 2019-03-09 PROCEDURE — 74011000250 HC RX REV CODE- 250: Performed by: INTERNAL MEDICINE

## 2019-03-09 PROCEDURE — 74011250637 HC RX REV CODE- 250/637: Performed by: STUDENT IN AN ORGANIZED HEALTH CARE EDUCATION/TRAINING PROGRAM

## 2019-03-09 PROCEDURE — 74011000250 HC RX REV CODE- 250: Performed by: HOSPITALIST

## 2019-03-09 PROCEDURE — 74011250636 HC RX REV CODE- 250/636: Performed by: STUDENT IN AN ORGANIZED HEALTH CARE EDUCATION/TRAINING PROGRAM

## 2019-03-09 PROCEDURE — 74011250637 HC RX REV CODE- 250/637: Performed by: HOSPITALIST

## 2019-03-09 PROCEDURE — 77030009834 HC MSK O2 TRACH VYRM -A

## 2019-03-09 PROCEDURE — 74011250636 HC RX REV CODE- 250/636: Performed by: INTERNAL MEDICINE

## 2019-03-09 PROCEDURE — 74011250636 HC RX REV CODE- 250/636: Performed by: EMERGENCY MEDICINE

## 2019-03-09 PROCEDURE — 74011250636 HC RX REV CODE- 250/636: Performed by: HOSPITALIST

## 2019-03-09 PROCEDURE — 77030013140 HC MSK NEB VYRM -A

## 2019-03-09 PROCEDURE — 77010033678 HC OXYGEN DAILY

## 2019-03-09 PROCEDURE — 74011250637 HC RX REV CODE- 250/637: Performed by: NURSE PRACTITIONER

## 2019-03-09 PROCEDURE — 74011250636 HC RX REV CODE- 250/636

## 2019-03-09 PROCEDURE — 36415 COLL VENOUS BLD VENIPUNCTURE: CPT

## 2019-03-09 PROCEDURE — 65660000000 HC RM CCU STEPDOWN

## 2019-03-09 PROCEDURE — 93005 ELECTROCARDIOGRAM TRACING: CPT

## 2019-03-09 RX ORDER — BUDESONIDE 0.5 MG/2ML
500 INHALANT ORAL
Status: DISCONTINUED | OUTPATIENT
Start: 2019-03-09 | End: 2019-03-14 | Stop reason: HOSPADM

## 2019-03-09 RX ORDER — HEPARIN SODIUM 10000 [USP'U]/100ML
10-25 INJECTION, SOLUTION INTRAVENOUS
Status: DISCONTINUED | OUTPATIENT
Start: 2019-03-09 | End: 2019-03-11

## 2019-03-09 RX ORDER — OXYCODONE AND ACETAMINOPHEN 5; 325 MG/1; MG/1
1 TABLET ORAL
Status: DISCONTINUED | OUTPATIENT
Start: 2019-03-09 | End: 2019-03-09

## 2019-03-09 RX ORDER — ALPRAZOLAM 0.5 MG/1
1 TABLET ORAL 2 TIMES DAILY
Status: DISCONTINUED | OUTPATIENT
Start: 2019-03-09 | End: 2019-03-14 | Stop reason: HOSPADM

## 2019-03-09 RX ORDER — HEPARIN SODIUM 1000 [USP'U]/ML
4000 INJECTION, SOLUTION INTRAVENOUS; SUBCUTANEOUS ONCE
Status: DISCONTINUED | OUTPATIENT
Start: 2019-03-09 | End: 2019-03-09 | Stop reason: SDUPTHER

## 2019-03-09 RX ORDER — HEPARIN SODIUM 1000 [USP'U]/ML
3000 INJECTION, SOLUTION INTRAVENOUS; SUBCUTANEOUS ONCE
Status: COMPLETED | OUTPATIENT
Start: 2019-03-09 | End: 2019-03-09

## 2019-03-09 RX ORDER — BUDESONIDE AND FORMOTEROL FUMARATE DIHYDRATE 160; 4.5 UG/1; UG/1
2 AEROSOL RESPIRATORY (INHALATION)
Status: DISCONTINUED | OUTPATIENT
Start: 2019-03-09 | End: 2019-03-09

## 2019-03-09 RX ORDER — ARFORMOTEROL TARTRATE 15 UG/2ML
15 SOLUTION RESPIRATORY (INHALATION)
Status: DISCONTINUED | OUTPATIENT
Start: 2019-03-09 | End: 2019-03-14 | Stop reason: HOSPADM

## 2019-03-09 RX ORDER — FAMOTIDINE 20 MG/1
20 TABLET, FILM COATED ORAL DAILY
Status: DISCONTINUED | OUTPATIENT
Start: 2019-03-09 | End: 2019-03-14 | Stop reason: HOSPADM

## 2019-03-09 RX ORDER — MONTELUKAST SODIUM 10 MG/1
10 TABLET ORAL
Status: DISCONTINUED | OUTPATIENT
Start: 2019-03-09 | End: 2019-03-14 | Stop reason: HOSPADM

## 2019-03-09 RX ORDER — ALBUTEROL SULFATE 2.5 MG/.5ML
2.5 SOLUTION RESPIRATORY (INHALATION)
Status: DISCONTINUED | OUTPATIENT
Start: 2019-03-09 | End: 2019-03-11

## 2019-03-09 RX ORDER — AMLODIPINE BESYLATE 10 MG/1
10 TABLET ORAL DAILY
Status: DISCONTINUED | OUTPATIENT
Start: 2019-03-09 | End: 2019-03-14 | Stop reason: HOSPADM

## 2019-03-09 RX ORDER — ATORVASTATIN CALCIUM 20 MG/1
20 TABLET, FILM COATED ORAL
Status: DISCONTINUED | OUTPATIENT
Start: 2019-03-09 | End: 2019-03-14 | Stop reason: HOSPADM

## 2019-03-09 RX ORDER — OXYCODONE AND ACETAMINOPHEN 5; 325 MG/1; MG/1
2 TABLET ORAL
Status: DISCONTINUED | OUTPATIENT
Start: 2019-03-09 | End: 2019-03-10

## 2019-03-09 RX ORDER — ACETAMINOPHEN 325 MG/1
650 TABLET ORAL
Status: DISCONTINUED | OUTPATIENT
Start: 2019-03-09 | End: 2019-03-14 | Stop reason: HOSPADM

## 2019-03-09 RX ORDER — HEPARIN SODIUM 1000 [USP'U]/ML
40 INJECTION, SOLUTION INTRAVENOUS; SUBCUTANEOUS ONCE
Status: COMPLETED | OUTPATIENT
Start: 2019-03-09 | End: 2019-03-09

## 2019-03-09 RX ORDER — SODIUM CHLORIDE 0.9 % (FLUSH) 0.9 %
5-40 SYRINGE (ML) INJECTION EVERY 8 HOURS
Status: DISCONTINUED | OUTPATIENT
Start: 2019-03-09 | End: 2019-03-14 | Stop reason: HOSPADM

## 2019-03-09 RX ORDER — ATORVASTATIN CALCIUM 20 MG/1
20 TABLET, FILM COATED ORAL ONCE
Status: COMPLETED | OUTPATIENT
Start: 2019-03-09 | End: 2019-03-09

## 2019-03-09 RX ORDER — IPRATROPIUM BROMIDE AND ALBUTEROL SULFATE 2.5; .5 MG/3ML; MG/3ML
3 SOLUTION RESPIRATORY (INHALATION)
Status: DISCONTINUED | OUTPATIENT
Start: 2019-03-09 | End: 2019-03-09

## 2019-03-09 RX ORDER — HEPARIN SODIUM 1000 [USP'U]/ML
60 INJECTION, SOLUTION INTRAVENOUS; SUBCUTANEOUS ONCE
Status: DISCONTINUED | OUTPATIENT
Start: 2019-03-09 | End: 2019-03-09 | Stop reason: SDUPTHER

## 2019-03-09 RX ORDER — GUAIFENESIN 100 MG/5ML
81 LIQUID (ML) ORAL DAILY
Status: DISCONTINUED | OUTPATIENT
Start: 2019-03-09 | End: 2019-03-14 | Stop reason: HOSPADM

## 2019-03-09 RX ORDER — SODIUM CHLORIDE 0.9 % (FLUSH) 0.9 %
5-40 SYRINGE (ML) INJECTION AS NEEDED
Status: DISCONTINUED | OUTPATIENT
Start: 2019-03-09 | End: 2019-03-14 | Stop reason: HOSPADM

## 2019-03-09 RX ADMIN — Medication 10 ML: at 09:13

## 2019-03-09 RX ADMIN — ALBUTEROL SULFATE 2.5 MG: 2.5 SOLUTION RESPIRATORY (INHALATION) at 14:46

## 2019-03-09 RX ADMIN — ATORVASTATIN CALCIUM 20 MG: 20 TABLET, FILM COATED ORAL at 00:55

## 2019-03-09 RX ADMIN — Medication 10 ML: at 08:34

## 2019-03-09 RX ADMIN — METHYLPREDNISOLONE SODIUM SUCCINATE 80 MG: 40 INJECTION, POWDER, FOR SOLUTION INTRAMUSCULAR; INTRAVENOUS at 19:37

## 2019-03-09 RX ADMIN — IPRATROPIUM BROMIDE AND ALBUTEROL SULFATE 3 ML: .5; 3 SOLUTION RESPIRATORY (INHALATION) at 05:45

## 2019-03-09 RX ADMIN — Medication 81 MG: at 13:31

## 2019-03-09 RX ADMIN — HEPARIN SODIUM 3850 UNITS: 1000 INJECTION INTRAVENOUS; SUBCUTANEOUS at 00:55

## 2019-03-09 RX ADMIN — BUDESONIDE AND FORMOTEROL FUMARATE DIHYDRATE 2 PUFF: 160; 4.5 AEROSOL RESPIRATORY (INHALATION) at 07:39

## 2019-03-09 RX ADMIN — METHYLPREDNISOLONE SODIUM SUCCINATE 80 MG: 40 INJECTION, POWDER, FOR SOLUTION INTRAMUSCULAR; INTRAVENOUS at 23:23

## 2019-03-09 RX ADMIN — ASPIRIN 81 MG 324 MG: 81 TABLET ORAL at 00:54

## 2019-03-09 RX ADMIN — FAMOTIDINE 20 MG: 20 TABLET ORAL at 09:10

## 2019-03-09 RX ADMIN — ALPRAZOLAM 1 MG: 0.5 TABLET ORAL at 09:10

## 2019-03-09 RX ADMIN — ARFORMOTEROL TARTRATE 15 MCG: 15 SOLUTION RESPIRATORY (INHALATION) at 12:20

## 2019-03-09 RX ADMIN — HEPARIN SODIUM AND DEXTROSE 10 UNITS/KG/HR: 10000; 5 INJECTION INTRAVENOUS at 00:58

## 2019-03-09 RX ADMIN — TIOTROPIUM BROMIDE 18 MCG: 18 CAPSULE ORAL; RESPIRATORY (INHALATION) at 08:56

## 2019-03-09 RX ADMIN — MONTELUKAST SODIUM 10 MG: 10 TABLET, COATED ORAL at 22:43

## 2019-03-09 RX ADMIN — OXYCODONE HYDROCHLORIDE AND ACETAMINOPHEN 2 TABLET: 5; 325 TABLET ORAL at 13:55

## 2019-03-09 RX ADMIN — HEPARIN SODIUM AND DEXTROSE 1260 UNITS/HR: 10000; 5 INJECTION INTRAVENOUS at 22:46

## 2019-03-09 RX ADMIN — ALPRAZOLAM 1 MG: 0.5 TABLET ORAL at 19:37

## 2019-03-09 RX ADMIN — METHYLPREDNISOLONE SODIUM SUCCINATE 80 MG: 40 INJECTION, POWDER, FOR SOLUTION INTRAMUSCULAR; INTRAVENOUS at 13:30

## 2019-03-09 RX ADMIN — SODIUM CHLORIDE 500 MG: 900 INJECTION, SOLUTION INTRAVENOUS at 04:00

## 2019-03-09 RX ADMIN — HEPARIN SODIUM 3000 UNITS: 1000 INJECTION INTRAVENOUS; SUBCUTANEOUS at 08:29

## 2019-03-09 RX ADMIN — BUDESONIDE 500 MCG: 0.5 INHALANT RESPIRATORY (INHALATION) at 20:00

## 2019-03-09 RX ADMIN — AMLODIPINE BESYLATE 10 MG: 10 TABLET ORAL at 09:10

## 2019-03-09 RX ADMIN — Medication 10 ML: at 14:00

## 2019-03-09 RX ADMIN — ALBUTEROL SULFATE 2.5 MG: 2.5 SOLUTION RESPIRATORY (INHALATION) at 20:00

## 2019-03-09 RX ADMIN — ROFLUMILAST 500 MCG: 500 TABLET ORAL at 13:31

## 2019-03-09 RX ADMIN — BUDESONIDE 500 MCG: 0.5 INHALANT RESPIRATORY (INHALATION) at 12:20

## 2019-03-09 RX ADMIN — Medication 10 ML: at 23:22

## 2019-03-09 RX ADMIN — IPRATROPIUM BROMIDE AND ALBUTEROL SULFATE 3 ML: .5; 3 SOLUTION RESPIRATORY (INHALATION) at 07:28

## 2019-03-09 RX ADMIN — METHYLPREDNISOLONE SODIUM SUCCINATE 60 MG: 125 INJECTION, POWDER, FOR SOLUTION INTRAMUSCULAR; INTRAVENOUS at 05:46

## 2019-03-09 RX ADMIN — MONTELUKAST SODIUM 10 MG: 10 TABLET, COATED ORAL at 05:53

## 2019-03-09 RX ADMIN — Medication 10 ML: at 09:14

## 2019-03-09 RX ADMIN — ARFORMOTEROL TARTRATE 15 MCG: 15 SOLUTION RESPIRATORY (INHALATION) at 20:00

## 2019-03-09 RX ADMIN — ATORVASTATIN CALCIUM 20 MG: 20 TABLET, FILM COATED ORAL at 22:00

## 2019-03-09 NOTE — CONSULTS
[address] [place] [address] [facility]  Pulmonary, Critical Care, and Sleep Medicine    Name: Nereyda Paris MRN: [id number]   : 1965 Hospital: [address] [place] [address]   Date: 3/9/2019        Pulmonary Initial In-Patient Consult                                              Consult requesting physician: Dr. David Watkins  Reason for Consult: COPD Exacerbation    IMPRESSION:   · COPD/asthma overlap with very poor control and frequent admissions for exacerbations now with moderate to severe exacerbation with concern for ACS as well. · MY - non-compliant; unclear the pressure settings  · Obesity  · HTN  · HF  · Tobacco abuse        RECOMMENDATIONS:   · Recommend increase in solumedrol and will convert symbicort to nebulized versions of ICS and LABA  · Continue spiriva but will switch duoneb to albuterol to avoid too much anti-cholinergic effect  · CXR to rule out PNA; also screen for influenza  · Start CPAP at empiric pressure of 12 cm H20 - will adjust to patient comfort  · Recommend starting Daliresp to address frequent AE and obestiy  · Nutrition: per primary  · Replace electrolytes  · HOB >=30 degree, aggressive pulmonary toileting, incentive spirometry, PT/OT eval and treat  · GI Prophylaxis: per primary team    · DVT Prophylaxis: per primary team    · Smoking cessation counseling done by me and spent >11 minutes on it. · Further recommendations will be based on the patient's response to recommended treatment and results of the investigation ordered. Subjective/History:     Nereyda Paris is a 46 y.o. female with PMHx significant for  COPD/asthma overlap, Obesity, MY and CHF who presents with difficulty breathing. Pt reports one week of worsening COPD symptoms including wheezing and trouble breathing. Her trigger is perfumes and smells. She has chest tightness \"from working so hard to breath\". She was discharged from [facility] about 3 weeks ago following a 9 day hospital stay for the same presentation.   Notes that she still smokes occasionally. MY but not using CPAP consistently. Concern for ACS so cardiology consulting and possible plan for LHC once COPD exacerbation resolving. Review of Systems:  A comprehensive review of systems was negative except for that written in the HPI. No Known Allergies     Past Medical History:   Diagnosis Date    Asthma     CHF (congestive heart failure) (HCC)     Chronic obstructive pulmonary disease (HCC)     Endocrine disease     thyroid issues    Gastrointestinal disorder     \"blockage in my stomach\"    Hypertension         Social History     Tobacco Use    Smoking status: Never Smoker    Smokeless tobacco: Never Used   Substance Use Topics    Alcohol use: No     Comment: socially          Prior to Admission medications    Medication Sig Start Date End Date Taking? Authorizing Provider   budesonide-formoterol (SYMBICORT) 160-4.5 mcg/actuation HFAA Take 2 Puffs by inhalation two (2) times a day. 8/23/18   Holden TAFOYA, DO   tiotropium (SPIRIVA) 18 mcg inhalation capsule Take 1 Cap by inhalation daily. 8/23/18   Holden TAFOYA, DO   budesonide (PULMICORT) 0.5 mg/2 mL nbsp 2 mL by Nebulization route two (2) times a day. 8/23/18   Arlyne Ly, DO   albuterol (PROAIR HFA) 90 mcg/actuation inhaler Take 2 Puffs by inhalation every four (4) hours as needed for Wheezing. 8/23/18   Holden TAFOYA, DO   albuterol (PROVENTIL VENTOLIN) 2.5 mg /3 mL (0.083 %) nebulizer solution 3 mL by Nebulization route every four (4) hours as needed for Wheezing. 1/26/18   Waldemar Peres, DO   ALPRAZolam Luca Cottrell) 1 mg tablet Take 1 Tab by mouth two (2) times a day. Max Daily Amount: 2 mg. 4/24/17   Marquis Dougherty MD   oxyCODONE-acetaminophen (PERCOCET 10)  mg per tablet Take 1 Tab by mouth every eight (8) hours as needed. Max Daily Amount: 3 Tabs. 4/24/17   Marquis Dougherty MD   amLODIPine (NORVASC) 10 mg tablet Take 1 Tab by mouth daily.  3/9/17   Lore Hernandez MD   bumetanide (BUMEX) 0.5 mg tablet Take 1 Tab by mouth every fourty-eight (48) hours. 3/9/17   Dharmesh Bee MD   famotidine (PEPCID) 20 mg tablet Take 1 Tab by mouth daily. 3/9/17   Dharmesh Bee MD   FLUoxetine (PROZAC) 20 mg capsule Take 3 Caps by mouth daily. 3/9/17   Dharmesh Bee MD   montelukast (SINGULAIR) 10 mg tablet Take 1 Tab by mouth nightly. 3/9/17   Dharmesh Bee MD   arformoterol (BROVANA) 15 mcg/2 mL nebu neb solution 2 mL by Nebulization route two (2) times a day. 3/9/17   Dharmesh Bee MD   hydrALAZINE (APRESOLINE) 50 mg tablet Take 1 Tab by mouth three (3) times daily.  3/9/17   Dharmesh Bee MD       Current Facility-Administered Medications   Medication Dose Route Frequency    heparin 25,000 units in D5W 250 ml infusion  10-25 Units/kg/hr IntraVENous TITRATE    ALPRAZolam (XANAX) tablet 1 mg  1 mg Oral BID    amLODIPine (NORVASC) tablet 10 mg  10 mg Oral DAILY    famotidine (PEPCID) tablet 20 mg  20 mg Oral DAILY    montelukast (SINGULAIR) tablet 10 mg  10 mg Oral QHS    tiotropium (SPIRIVA) inhalation capsule 18 mcg  1 Cap Inhalation DAILY    sodium chloride (NS) flush 5-40 mL  5-40 mL IntraVENous Q8H    sodium chloride (NS) flush 5-40 mL  5-40 mL IntraVENous PRN    azithromycin (ZITHROMAX) 500 mg in 0.9% sodium chloride (MBP/ADV) 250 mL adv  500 mg IntraVENous Q24H    acetaminophen (TYLENOL) tablet 650 mg  650 mg Oral Q4H PRN    influenza vaccine 2018-19 (6 mos+)(PF) (FLUARIX QUAD/FLULAVAL QUAD) injection 0.5 mL  0.5 mL IntraMUSCular ONCE    oxyCODONE-acetaminophen (PERCOCET) 5-325 mg per tablet 1 Tab  1 Tab Oral Q8H PRN    aspirin chewable tablet 81 mg  81 mg Oral DAILY    atorvastatin (LIPITOR) tablet 20 mg  20 mg Oral QHS    albuterol CONCENTRATE 2.5mg/0.5 mL neb soln  2.5 mg Nebulization Q6H RT    methylPREDNISolone (PF) (Solu-MEDROL) injection 80 mg  80 mg IntraVENous Q6H    budesonide (PULMICORT) 500 mcg/2 ml nebulizer suspension  500 mcg Nebulization BID RT    arformoterol (BROVANA) neb solution 15 mcg  15 mcg Nebulization BID RT         Objective:   Vital Signs:    Visit Vitals  /69 (BP 1 Location: Left arm, BP Patient Position: At rest)   Pulse 88   Temp 97.7 °F (36.5 °C)   Resp 18   Wt 94.1 kg (207 lb 6.4 oz)   SpO2 98%   Breastfeeding? No   BMI 41.89 kg/m²       O2 Device: Nasal cannula   O2 Flow Rate (L/min): 2 l/min   Temp (24hrs), Av.3 °F (36.8 °C), Min:97.7 °F (36.5 °C), Max:98.7 °F (37.1 °C)       Intake/Output:   Last shift:      No intake/output data recorded. Last 3 shifts: No intake/output data recorded. No intake or output data in the 24 hours ending 19 1023    Physical Exam:     General:  Alert, Awake, NAD, cooperative, no distress, appears stated age. Head:   Normocephalic, without obvious abnormality, atraumatic. Eye:   Conjunctivae/corneas clear. PERRLA, no scleral icterus, no pallor, no cyanosis  Nose:   Nares normal. Septum midline. Mucosa normal without erythema/exudate. No drainage/discharge. No sinus tenderness. Throat:  Lips, mucosa, and tongue normal. Teeth and gums normal. No tonsillar enlargement, no erythema, no exudates, no oral thrush  Neck:   Supple, symmetric, thyroid: no enlargement/tenderness/nodule, no JVD, no carotid bruit, no lymphadenopathy. Trachea midline  Back & spine: Symmetric, no curvature. Chest wall: No tenderness or deformity. No rash  Lung:   Breath sounds with good movement heard throughout with end expiratory wheezes  Heart:   Regular rate & rhythm. S1 S2 present, no murmur, no gallop, no click, no rub  Abdomen:  Soft, NT, ND, +BS, no masses, no organomegaly  Extremities:  No pedal edema, no cyanosis, no clubbing  Pulses: 2+ and symmetric in DP  Lymphatic:  No cervical, supraclavicular and axillary palpable lymphadenopathy. Musculoskeletal: No joint swelling or tenderness.   Neurologic:  Grossly non focal.        Data:         Chemistry Recent Labs     19  1855   *      K 4.1   *   CO2 31   BUN 15   CREA 1.10   CA 8.5   AGAP 5   BUCR 14      TP 6.9   ALB 3.7   GLOB 3.2   AGRAT 1.2        Lactic Acid Lactic acid   Date Value Ref Range Status   01/13/2017 1.4 0.4 - 2.0 MMOL/L Final     No results for input(s): LAC in the last 72 hours. Liver Enzymes Protein, total   Date Value Ref Range Status   03/08/2019 6.9 6.4 - 8.2 g/dL Final     Albumin   Date Value Ref Range Status   03/08/2019 3.7 3.4 - 5.0 g/dL Final     Globulin   Date Value Ref Range Status   03/08/2019 3.2 2.0 - 4.0 g/dL Final     A-G Ratio   Date Value Ref Range Status   03/08/2019 1.2 0.8 - 1.7   Final     AST (SGOT)   Date Value Ref Range Status   03/08/2019 14 (L) 15 - 37 U/L Final     Alk.  phosphatase   Date Value Ref Range Status   03/08/2019 116 45 - 117 U/L Final     Recent Labs     03/08/19  1855   TP 6.9   ALB 3.7   GLOB 3.2   AGRAT 1.2   SGOT 14*           CBC w/Diff Recent Labs     03/09/19  0519 03/08/19  1855   WBC 6.4 7.2   RBC 4.72 4.86   HGB 13.6 14.1   HCT 42.3 43.1    246   GRANS 81* 53   LYMPH 19* 39   EOS 0 1        Cardiac Enzymes Lab Results   Component Value Date/Time     03/08/2019 10:52 PM     03/08/2019 06:55 PM    CKMB 3.7 (H) 03/08/2019 10:52 PM    CKMB 4.3 (H) 03/08/2019 06:55 PM    CKND1 3.7 03/08/2019 10:52 PM    CKND1 3.8 03/08/2019 06:55 PM    TROIQ 0.04 03/08/2019 10:52 PM    TROIQ 0.04 03/08/2019 06:55 PM        BNP Lab Results   Component Value Date/Time    BNP 19.9 12/07/2015 05:23 PM    BNP 3.4 11/01/2015 03:20 PM        Coagulation Recent Labs     03/09/19  0637 03/09/19  0022   APTT 52.5* 26.7         Thyroid  Lab Results   Component Value Date/Time    TSH 0.07 (L) 04/18/2017 10:44 AM          Lipid Panel No results found for: CHOL, CHOLPOCT, CHOLX, CHLST, CHOLV, 827174, HDL, LDL, LDLC, DLDLP, 270942, VLDLC, VLDL, TGLX, TRIGL, TRIGP, TGLPOCT, CHHD, CHHDX     ABG Recent Labs     03/08/19  2211   PHI 7.330*   PCO2I 49.4*   PO2I 71*   HCO3I 26.1*   FIO2I 21 Urinalysis Lab Results   Component Value Date/Time    Color YELLOW 04/18/2017 02:30 PM    Appearance CLEAR 04/18/2017 02:30 PM    Specific gravity 1.016 04/18/2017 02:30 PM    pH (UA) 5.5 04/18/2017 02:30 PM    Protein NEGATIVE  04/18/2017 02:30 PM    Glucose NEGATIVE  04/18/2017 02:30 PM    Ketone 40 (A) 04/18/2017 02:30 PM    Bilirubin NEGATIVE  04/18/2017 02:30 PM    Urobilinogen 1.0 04/18/2017 02:30 PM    Nitrites NEGATIVE  04/18/2017 02:30 PM    Leukocyte Esterase NEGATIVE  04/18/2017 02:30 PM    Epithelial cells FEW 01/11/2017 06:24 PM    Bacteria FEW (A) 01/11/2017 06:24 PM    WBC NONE 01/11/2017 06:24 PM    RBC NONE 01/11/2017 06:24 PM        Micro  No results for input(s): SDES, CULT in the last 72 hours. No results for input(s): CULT in the last 72 hours. XR (Most Recent). CXR reviewed by me and compared with previous CXR Results from Hospital Encounter encounter on 02/27/18   XR CHEST PORT    Narrative Portable chest    History: Chest pain and dyspnea    Comparison: Chest radiograph 1/26/2018 and 12/19/2017    Findings: The cardiomediastinal silhouette is within normal limits for technique. The  pulmonary vasculature is unremarkable. Stable linear left midlung scarring. No  focal consolidation, pleural effusion or pneumothorax. No acute osseous  abnormality. Impression Impression:    No radiographic evidence of an acute cardiopulmonary process. No significant  interval change. Thank you for this referral.        CT (Most Recent) Results from [place] encounter on 12/19/17   CT CHEST W CONT    Narrative CT CHEST WITH ENHANCEMENT    INDICATION: History of COPD, asthma, congestive heart failure with one week of  constant chest tightness, recent hospitalization for pneumonia completed  antibiotics and steroids without improvement.     TECHNIQUE: Axial images obtained from the thoracic inlet to the level of the  diaphragm following uneventful administration of 100 cc Isovue-300 nonionic  intravenous contrast. Coronal and sagittal reformatted images were obtained. All CT scans at this facility are performed using dose optimization technique as  appropriate to a performed exam, to include automated exposure control,  adjustment of the mA and/or kV according to patient size (including appropriate  matching first site-specific examinations), or use of iterative reconstruction  technique. COMPARISON: CT chest 1/26/2017. CHEST FINDINGS:     Thyroid: Similar right thyroid enlargement with likely multifocal nodules  measuring up to 2 cm. Pericardium/ Heart: Heart size is normal. No pericardial effusion. No definite  calcified coronary arteriosclerosis. Aorta/ Vessels: No aneurysm or dissection. Lymph Nodes: Unremarkable. .    Lungs: Trachea and central bronchial tree are patent. Similar mild subsegmental  atelectasis or scarring in the left lower lobe. No suspicious pulmonary nodule,  consolidation or mass. Pleura: No effusion. Upper Abdomen: Unchanged hepatic hypodensities, the largest is 1.6 cm with cyst  density. Gallbladder is decompressed. 1.4 cm left upper pole renal cyst.    Bones/soft tissues: Unremarkable. Impression IMPRESSION:    No acute findings to explain patient's symptoms. Right thyroid enlargement and multinodular goiter, previously and better  evaluated with ultrasound. EKG No results found for this or any previous visit. ECHO No results found for this or any previous visit. PFT No flowsheet data found.      Other ASA reactivity:   Pre-albumin:   Ionized Calcium:   NH4:   T3, FT4:  Cortisol:  Urine Osm:  Urine Lytes:   HbA1c:      Recent Results (from the past 24 hour(s))   CBC WITH AUTOMATED DIFF    Collection Time: 03/08/19  6:55 PM   Result Value Ref Range    WBC 7.2 4.6 - 13.2 K/uL    RBC 4.86 4.20 - 5.30 M/uL    HGB 14.1 12.0 - 16.0 g/dL    HCT 43.1 35.0 - 45.0 %    MCV 88.7 74.0 - 97.0 FL    MCH 29.0 24.0 - 34.0 PG    MCHC 32.7 31.0 - 37.0 g/dL    RDW 13.6 11.6 - 14.5 %    PLATELET 157 561 - 545 K/uL    MPV 10.6 9.2 - 11.8 FL    NEUTROPHILS 53 40 - 73 %    LYMPHOCYTES 39 21 - 52 %    MONOCYTES 7 3 - 10 %    EOSINOPHILS 1 0 - 5 %    BASOPHILS 0 0 - 2 %    ABS. NEUTROPHILS 3.8 1.8 - 8.0 K/UL    ABS. LYMPHOCYTES 2.8 0.9 - 3.6 K/UL    ABS. MONOCYTES 0.5 0.05 - 1.2 K/UL    ABS. EOSINOPHILS 0.1 0.0 - 0.4 K/UL    ABS. BASOPHILS 0.0 0.0 - 0.1 K/UL    DF AUTOMATED     METABOLIC PANEL, COMPREHENSIVE    Collection Time: 03/08/19  6:55 PM   Result Value Ref Range    Sodium 145 136 - 145 mmol/L    Potassium 4.1 3.5 - 5.5 mmol/L    Chloride 109 (H) 100 - 108 mmol/L    CO2 31 21 - 32 mmol/L    Anion gap 5 3.0 - 18 mmol/L    Glucose 104 (H) 74 - 99 mg/dL    BUN 15 7.0 - 18 MG/DL    Creatinine 1.10 0.6 - 1.3 MG/DL    BUN/Creatinine ratio 14 12 - 20      GFR est AA >60 >60 ml/min/1.73m2    GFR est non-AA 52 (L) >60 ml/min/1.73m2    Calcium 8.5 8.5 - 10.1 MG/DL    Bilirubin, total 0.3 0.2 - 1.0 MG/DL    ALT (SGPT) 19 13 - 56 U/L    AST (SGOT) 14 (L) 15 - 37 U/L    Alk.  phosphatase 116 45 - 117 U/L    Protein, total 6.9 6.4 - 8.2 g/dL    Albumin 3.7 3.4 - 5.0 g/dL    Globulin 3.2 2.0 - 4.0 g/dL    A-G Ratio 1.2 0.8 - 1.7     CARDIAC PANEL,(CK, CKMB & TROPONIN)    Collection Time: 03/08/19  6:55 PM   Result Value Ref Range     26 - 192 U/L    CK - MB 4.3 (H) <3.6 ng/ml    CK-MB Index 3.8 0.0 - 4.0 %    Troponin-I, QT 0.04 0.0 - 0.045 NG/ML   EKG, 12 LEAD, INITIAL    Collection Time: 03/08/19  7:01 PM   Result Value Ref Range    Ventricular Rate 81 BPM    Atrial Rate 81 BPM    P-R Interval 126 ms    QRS Duration 84 ms    Q-T Interval 392 ms    QTC Calculation (Bezet) 455 ms    Calculated P Axis 54 degrees    Calculated R Axis 37 degrees    Calculated T Axis 1 degrees    Diagnosis       Normal sinus rhythm  Septal infarct (cited on or before 23-AUG-2018)  T wave abnormality, consider anterolateral ischemia  Abnormal ECG  When compared with ECG of 23-AUG-2018 10:36,  Nonspecific T wave abnormality now evident in Inferior leads     POC G3    Collection Time: 03/08/19 10:11 PM   Result Value Ref Range    Device: ROOM AIR      FIO2 (POC) 21 %    pH (POC) 7.330 (L) 7.35 - 7.45      pCO2 (POC) 49.4 (H) 35.0 - 45.0 MMHG    pO2 (POC) 71 (L) 80 - 100 MMHG    HCO3 (POC) 26.1 (H) 22 - 26 MMOL/L    sO2 (POC) 93 92 - 97 %    Base deficit (POC) 1 mmol/L    Allens test (POC) YES      Total resp. rate 20      Site LEFT RADIAL      Patient temp.  98.6      Specimen type (POC) ARTERIAL      Performed by [facility]    EKG, 12 LEAD, SUBSEQUENT    Collection Time: 03/08/19 10:30 PM   Result Value Ref Range    Ventricular Rate 74 BPM    Atrial Rate 74 BPM    P-R Interval 122 ms    QRS Duration 76 ms    Q-T Interval 444 ms    QTC Calculation (Bezet) 492 ms    Calculated P Axis 55 degrees    Calculated R Axis 42 degrees    Calculated T Axis -49 degrees    Diagnosis       Normal sinus rhythm  Septal infarct (cited on or before 23-AUG-2018)  T wave abnormality, consider inferior ischemia  T wave abnormality, consider anterolateral ischemia  Abnormal ECG  When compared with ECG of 08-MAR-2019 19:01,  Inverted T waves have replaced nonspecific T wave abnormality in Inferior   leads     CARDIAC PANEL,(CK, CKMB & TROPONIN)    Collection Time: 03/08/19 10:52 PM   Result Value Ref Range     26 - 192 U/L    CK - MB 3.7 (H) <3.6 ng/ml    CK-MB Index 3.7 0.0 - 4.0 %    Troponin-I, QT 0.04 0.0 - 0.045 NG/ML   PTT    Collection Time: 03/09/19 12:22 AM   Result Value Ref Range    aPTT 26.7 23.0 - 36.4 SEC   CBC WITH AUTOMATED DIFF    Collection Time: 03/09/19  5:19 AM   Result Value Ref Range    WBC 6.4 4.6 - 13.2 K/uL    RBC 4.72 4.20 - 5.30 M/uL    HGB 13.6 12.0 - 16.0 g/dL    HCT 42.3 35.0 - 45.0 %    MCV 89.6 74.0 - 97.0 FL    MCH 28.8 24.0 - 34.0 PG    MCHC 32.2 31.0 - 37.0 g/dL    RDW 13.6 11.6 - 14.5 %    PLATELET 483 176 - 569 K/uL    MPV 11.4 9.2 - 11.8 FL    NEUTROPHILS 81 (H) 40 - 73 %    LYMPHOCYTES 19 (L) 21 - 52 %    MONOCYTES 0 (L) 3 - 10 %    EOSINOPHILS 0 0 - 5 %    BASOPHILS 0 0 - 2 %    ABS. NEUTROPHILS 5.2 1.8 - 8.0 K/UL    ABS. LYMPHOCYTES 1.2 0.9 - 3.6 K/UL    ABS. MONOCYTES 0.0 (L) 0.05 - 1.2 K/UL    ABS. EOSINOPHILS 0.0 0.0 - 0.4 K/UL    ABS. BASOPHILS 0.0 0.0 - 0.1 K/UL    DF AUTOMATED     PTT    Collection Time: 03/09/19  6:37 AM   Result Value Ref Range    aPTT 52.5 (H) 23.0 - 36.4 SEC         Telemetry:    The patient is: [x] acutely ill Risk of deterioration: [x] moderate    [] critically ill  [] high     [x]See my orders for details    My assessment, plan of care, findings, medications, side effects etc were discussed with:  [] Nurse [] PT/OT    [] Respiratory therapy [x]     [] Family: answered all questions to satisfaction [x] Patient: answered all questions to satisfaction   [] Pharmacist []      [x] Total critical care time exclusive of procedures 60 minutes with complex decision making, coordination of care and counseling patient performed and > 50% time spent in face to face evaluation.       Jennifer Chilel MD  3/9/2019

## 2019-03-09 NOTE — H&P
History & Physical    Patient: Lianne Bentley MRN: [id number]  CSN: [id number]    YOB: 1965  Age: 46 y.o. Sex: female      DOA: 3/8/2019    Chief Complaint   Patient presents with    Shortness of Breath    Chest Pain          HPI:     Lianne Bentley is a 46 y.o. female who has a past medical history of COPD, nicotine use, obstructive sleep apnea, hypertension with hypertensive cardiovascular disease. She was recently discharged from a [facility] facility after being treated for acute COPD exacerbation  Patient stated that since her discharge she has not been back to her baseline level of functioning. She continues to have shortness of breath with minimal exertion. She takes opiates medications for chronic pain. She states that her cough and shortness of breath have worsened over the past two days accompanied by wheezing and orthopnea. She states that after prolonged bout of coughing, she has noticed chest pain on the center of the chest. She is unable to state whether or not chest pain is related to exertion. Patient arrived in the emergency room with respiratory acidosis and hypercapnia along with hypoxia. She was put on BiPAP. EKG was concerning for myocardial ischemia including the appearance of ST elevations and T-wave conversions. Initial troponin 0.04  upon review of previous EKG records, of these findings are not new. Repeat EKG has shown some deepening of the T waves      Past Medical History:   Diagnosis Date    Asthma     CHF (congestive heart failure) (HCC)     Chronic obstructive pulmonary disease (HCC)     Endocrine disease     thyroid issues    Gastrointestinal disorder     \"blockage in my stomach\"    Hypertension        History reviewed. No pertinent surgical history. History reviewed. No pertinent family history.     Social History     Socioeconomic History    Marital status:      Spouse name: Not on file    Number of children: Not on file    Years of education: Not on file    Highest education level: Not on file   Tobacco Use    Smoking status: Never Smoker    Smokeless tobacco: Never Used   Substance and Sexual Activity    Alcohol use: No     Comment: socially    Drug use: No    Sexual activity: No     Comment: last used 9 years ago       Prior to Admission medications    Medication Sig Start Date End Date Taking? Authorizing Provider   budesonide-formoterol (SYMBICORT) 160-4.5 mcg/actuation HFAA Take 2 Puffs by inhalation two (2) times a day. 8/23/18   Eber Krabbe R, DO   tiotropium (SPIRIVA) 18 mcg inhalation capsule Take 1 Cap by inhalation daily. 8/23/18   Eber Krabbe R, DO   budesonide (PULMICORT) 0.5 mg/2 mL nbsp 2 mL by Nebulization route two (2) times a day. 8/23/18   Paris Locker, DO   albuterol (PROAIR HFA) 90 mcg/actuation inhaler Take 2 Puffs by inhalation every four (4) hours as needed for Wheezing. 8/23/18   Eber Krabbe R, DO   albuterol (PROVENTIL VENTOLIN) 2.5 mg /3 mL (0.083 %) nebulizer solution 3 mL by Nebulization route every four (4) hours as needed for Wheezing. 1/26/18   Daryl Peres DO   ALPRAZolam Ion Rodriguez) 1 mg tablet Take 1 Tab by mouth two (2) times a day. Max Daily Amount: 2 mg. 4/24/17   Anna Virgen MD   oxyCODONE-acetaminophen (PERCOCET 10)  mg per tablet Take 1 Tab by mouth every eight (8) hours as needed. Max Daily Amount: 3 Tabs. 4/24/17   Anna Virgen MD   amLODIPine (NORVASC) 10 mg tablet Take 1 Tab by mouth daily. 3/9/17   Pato Ventura MD   bumetanide (BUMEX) 0.5 mg tablet Take 1 Tab by mouth every fourty-eight (48) hours. 3/9/17   Pato Ventura MD   famotidine (PEPCID) 20 mg tablet Take 1 Tab by mouth daily. 3/9/17   Pato Ventura MD   FLUoxetine (PROZAC) 20 mg capsule Take 3 Caps by mouth daily. 3/9/17   Pato Ventura MD   montelukast (SINGULAIR) 10 mg tablet Take 1 Tab by mouth nightly.  3/9/17   Pato Ventura MD   arformoterol (BROVANA) 15 mcg/2 mL nebu neb solution 2 mL by Nebulization route two (2) times a day. 3/9/17   Mitali Olmedo MD   hydrALAZINE (APRESOLINE) 50 mg tablet Take 1 Tab by mouth three (3) times daily. 3/9/17   Mitali Olmedo MD       No Known Allergies    Review of Systems    Refer to HPI for positive findings. All other systems reviewed and are negative    Physical Exam:     Physical Exam:  Visit Vitals  /78   Pulse (!) 108   Temp 98.6 °F (37 °C)   Resp 25   Wt 96.2 kg (212 lb)   SpO2 97%   BMI 42.82 kg/m²      O2 Device: BIPAP    Temp (24hrs), Av.7 °F (37.1 °C), Min:98.6 °F (37 °C), Max:98.7 °F (37.1 °C)       No intake/output data recorded. No intake/output data recorded. General:  Alert, cooperative, no distress, appears stated age. Head:  Normocephalic, without obvious abnormality, atraumatic. Eyes:  Conjunctivae/corneas clear. PERRL, EOMs intact. Nose: Nares normal. No drainage or sinus tenderness. Throat: Lips, mucosa, and tongue normal. Teeth and gums normal.   Neck: Supple, symmetrical, trachea midline, no adenopathy, thyroid: no enlargement/tenderness/nodules, no carotid bruit and no JVD. Back:   ROM normal. No CVA tenderness. Lungs:   Diffuse bilateral wheezing, air entry is globally reduced   Chest wall:  No tenderness or deformity. Heart:  Regular rate and rhythm, S1, S2 normal, no murmur, click, rub or gallop. Abdomen: Soft, non-tender. Bowel sounds normal. No masses,  No organomegaly. Extremities: Extremities normal, atraumatic, no cyanosis or edema. Pulses: 2+ and symmetric all extremities. Skin: Skin color, texture, turgor normal. No rashes or lesions   Neurologic: CNII-XII intact. No focal motor or sensory deficit.        Labs Reviewed:    Recent Results (from the past 24 hour(s))   CBC WITH AUTOMATED DIFF    Collection Time: 19  6:55 PM   Result Value Ref Range    WBC 7.2 4.6 - 13.2 K/uL    RBC 4.86 4.20 - 5.30 M/uL    HGB 14.1 12.0 - 16.0 g/dL    HCT 43.1 35.0 - 45.0 %    MCV 88.7 74.0 - 97.0 FL MCH 29.0 24.0 - 34.0 PG    MCHC 32.7 31.0 - 37.0 g/dL    RDW 13.6 11.6 - 14.5 %    PLATELET 068 924 - 946 K/uL    MPV 10.6 9.2 - 11.8 FL    NEUTROPHILS 53 40 - 73 %    LYMPHOCYTES 39 21 - 52 %    MONOCYTES 7 3 - 10 %    EOSINOPHILS 1 0 - 5 %    BASOPHILS 0 0 - 2 %    ABS. NEUTROPHILS 3.8 1.8 - 8.0 K/UL    ABS. LYMPHOCYTES 2.8 0.9 - 3.6 K/UL    ABS. MONOCYTES 0.5 0.05 - 1.2 K/UL    ABS. EOSINOPHILS 0.1 0.0 - 0.4 K/UL    ABS. BASOPHILS 0.0 0.0 - 0.1 K/UL    DF AUTOMATED     METABOLIC PANEL, COMPREHENSIVE    Collection Time: 03/08/19  6:55 PM   Result Value Ref Range    Sodium 145 136 - 145 mmol/L    Potassium 4.1 3.5 - 5.5 mmol/L    Chloride 109 (H) 100 - 108 mmol/L    CO2 31 21 - 32 mmol/L    Anion gap 5 3.0 - 18 mmol/L    Glucose 104 (H) 74 - 99 mg/dL    BUN 15 7.0 - 18 MG/DL    Creatinine 1.10 0.6 - 1.3 MG/DL    BUN/Creatinine ratio 14 12 - 20      GFR est AA >60 >60 ml/min/1.73m2    GFR est non-AA 52 (L) >60 ml/min/1.73m2    Calcium 8.5 8.5 - 10.1 MG/DL    Bilirubin, total 0.3 0.2 - 1.0 MG/DL    ALT (SGPT) 19 13 - 56 U/L    AST (SGOT) 14 (L) 15 - 37 U/L    Alk.  phosphatase 116 45 - 117 U/L    Protein, total 6.9 6.4 - 8.2 g/dL    Albumin 3.7 3.4 - 5.0 g/dL    Globulin 3.2 2.0 - 4.0 g/dL    A-G Ratio 1.2 0.8 - 1.7     CARDIAC PANEL,(CK, CKMB & TROPONIN)    Collection Time: 03/08/19  6:55 PM   Result Value Ref Range     26 - 192 U/L    CK - MB 4.3 (H) <3.6 ng/ml    CK-MB Index 3.8 0.0 - 4.0 %    Troponin-I, QT 0.04 0.0 - 0.045 NG/ML   EKG, 12 LEAD, INITIAL    Collection Time: 03/08/19  7:01 PM   Result Value Ref Range    Ventricular Rate 81 BPM    Atrial Rate 81 BPM    P-R Interval 126 ms    QRS Duration 84 ms    Q-T Interval 392 ms    QTC Calculation (Bezet) 455 ms    Calculated P Axis 54 degrees    Calculated R Axis 37 degrees    Calculated T Axis 1 degrees    Diagnosis       Normal sinus rhythm  Septal infarct (cited on or before 23-AUG-2018)  T wave abnormality, consider anterolateral ischemia  Abnormal ECG  When compared with ECG of 23-AUG-2018 10:36,  Nonspecific T wave abnormality now evident in Inferior leads     POC G3    Collection Time: 03/08/19 10:11 PM   Result Value Ref Range    Device: ROOM AIR      FIO2 (POC) 21 %    pH (POC) 7.330 (L) 7.35 - 7.45      pCO2 (POC) 49.4 (H) 35.0 - 45.0 MMHG    pO2 (POC) 71 (L) 80 - 100 MMHG    HCO3 (POC) 26.1 (H) 22 - 26 MMOL/L    sO2 (POC) 93 92 - 97 %    Base deficit (POC) 1 mmol/L    Allens test (POC) YES      Total resp. rate 20      Site LEFT RADIAL      Patient temp. 98.6      Specimen type (POC) ARTERIAL      Performed by Radhames Lobo    EKG, 12 LEAD, SUBSEQUENT    Collection Time: 03/08/19 10:30 PM   Result Value Ref Range    Ventricular Rate 74 BPM    Atrial Rate 74 BPM    P-R Interval 122 ms    QRS Duration 76 ms    Q-T Interval 444 ms    QTC Calculation (Bezet) 492 ms    Calculated P Axis 55 degrees    Calculated R Axis 42 degrees    Calculated T Axis -49 degrees    Diagnosis       Normal sinus rhythm  Septal infarct (cited on or before 23-AUG-2018)  T wave abnormality, consider inferior ischemia  T wave abnormality, consider anterolateral ischemia  Abnormal ECG  When compared with ECG of 08-MAR-2019 19:01,  Inverted T waves have replaced nonspecific T wave abnormality in Inferior   leads     CARDIAC PANEL,(CK, CKMB & TROPONIN)    Collection Time: 03/08/19 10:52 PM   Result Value Ref Range     26 - 192 U/L    CK - MB 3.7 (H) <3.6 ng/ml    CK-MB Index 3.7 0.0 - 4.0 %    Troponin-I, QT 0.04 0.0 - 0.045 NG/ML   PTT    Collection Time: 03/09/19 12:22 AM   Result Value Ref Range    aPTT 26.7 23.0 - 36.4 SEC       Procedures/imaging: see electronic medical records for all procedures/Xrays and details which were not copied into this note but were reviewed prior to creation of Plan        Assessment/Plan     1. Acute hypoxic hypercapnic respiratory failure  2. Acute COPD exacerbation  3. Abnormal EKG  4. Elevated Troponin  5. Respiratory acidosis  6.  Nicotine use  7. Hypertension    Plan    Patient has received BiPAP in the ED and has improved somewhat, She is maintaining saturation on nasal canula and more awake and responsive  I suspect pain medication use may be contributory to hypoventilation on arrival, She does have significant wheezes and not moving air well  She requires admission for COPD exacerbation for nebulizers, systemic steroids and Abx  Regarding her EKG, Concerning for myocardial ischemia with subtle changes between her two EKGs, She has a slightly elevated troponin and no hx of recent ischemic work up.    Admit and put on Aspirin, heparin drip recommended by cardiology per ED report and has been ordered  Continue home medicines for HTN    DVT ppx - therapeutic anticoagulation    Full Code    Active Problems:    Acute bronchitis with chronic obstructive pulmonary disease (COPD) ([facility] [address] (3/9/2019)      T wave inversion in EKG (3/9/2019)        Tameka Albrecht MD  March 9, 2019

## 2019-03-09 NOTE — ROUTINE PROCESS
Bedside and Verbal shift change report given to [address] (oncoming nurse) by Princess Gilberto WAGGONER (offgoing nurse). Report included the following information SBAR, Kardex, Intake/Output, MAR, Recent Results and Cardiac Rhythm NSR.

## 2019-03-09 NOTE — ROUTINE PROCESS
TRANSFER - IN REPORT:    Verbal report received from [facility]) on Inder Loera  being received from ED(unit) for routine progression of care      Report consisted of patients Situation, Background, Assessment and   Recommendations(SBAR). Information from the following report(s) SBAR, Kardex, ED Summary, Intake/Output, Recent Results, Med Rec Status and Cardiac Rhythm Sinu tach was reviewed with the receiving nurse. Opportunity for questions and clarification was provided. Assessment will be completed upon patients arrival to unit and care assumed.

## 2019-03-09 NOTE — ED NOTES
Pt took herself off of BiPAP, states that the mask is irritating her face. Pt refuses to hold nebulizer, requests mask for nebulizer treatment. Mask provided to pt.

## 2019-03-09 NOTE — PROGRESS NOTES
Problem: Falls - Risk of  Goal: *Absence of Falls  Document Delroy Fall Risk and appropriate interventions in the flowsheet.   Outcome: Progressing Towards Goal  Fall Risk Interventions:                      History of Falls Interventions: Room close to nurse's station

## 2019-03-09 NOTE — CONSULTS
[facility] - Consult Note    Date of  Admission: 3/8/2019  6:43 PM     Primary Care Physician:  Abilio, MD Shalini     Plan:     1. Chest pain- likely angina- has abnormal ekg concerning with ischemia with deep T waves in inferior and lateral leads. - her troponin are flat-  Patient is chest pain free. Reports no improvement of her SOB. - continue with heparin drip, added asa and statin- Pulmonary has been consulted. Needs cardiac w/u likely LHC when COPD exacerbation improves   2. Hypertension- stable on Norvasc   3. COPD exacerbation- on admission patient was with respiratory acidosis and hypercapnia along with hypoxia. She was put on BiPAP. currently on O2 nasal canula   4. Obstructive sleep apnea   5. Chronic pain syndrome- medications per medical team   6. Depression- grieving her daughter recent passing      Patient with acute COPD exacerbation- cardiology consult for abnormal EKG- concerning for ischemia. Recommend pulmonary consult/ bronchodilators- will consider cardiac w/u once bronchospasm improves. Echo 2018   NORMAL LEFT VENTRICULAR CAVITY SIZE. INCREASED (HYPERDYNAMIC) LEFT VENTRICULAR SYSTOLIC FUNCTION WITH AN ESTIMATED EJECTION   FRACTION OF 75%. NEAR COMPLETE CAVITY OBLITERATION WITH SPONTANEOUS INTRACAVITY VELOCITIES REACHING 3.2 M/S.   NORMAL DIASTOLIC FUNCTION. NORMAL RIGHT VENTRICULAR SIZE AND SYSTOLIC FUNCTION. AORTIC VALVE NOT WELL VISUALIZED/ EVALUATED ON THIS STUDY. INADEQUATE TRICUSPID REGURGITATION FOR PULMONARY ARTERY PRESSURE ESTIMATE. OTHER FINDINGS AS NOTED BELOW. COMPARED TO PREVIOUS ECHO STUDY DATED 12/05/16:   EJECTION FRACTION PREVIOUSLY 70%. RIGHT ATRIAL PRESSURE PREVIOUSLY ESTIMATED AT 8 MMHG.              Assessment:     Hospital Problems  Date Reviewed: 12/19/2017          Codes Class Noted POA    Acute bronchitis with chronic obstructive pulmonary disease (COPD) ([address] ICD-10-CM: J44.0, J20.9  ICD-9-CM: 491.22  3/9/2019 Unknown        T wave inversion in EKG ICD-10-CM: R94.31  ICD-9-CM: 794.31  3/9/2019 Unknown                   History of Present Illness: This is a 46 y.o. female admitted for Acute bronchitis with chronic obstructive pulmonary disease (COPD)patient with PMHx of COPD, Asthma, tobacco use, hypertension. The patient presented to the ED c/o increasing in SOB and chest pain along with generalized aching. She was recently discharged from a [facility] facility after being treated for acute COPD exacerbation. Patient stated that since her discharge she has not been back to her baseline level of functioning. She continues to have shortness of breath with minimal exertion. She takes opiates medications for chronic pain. She states that her cough and shortness of breath have worsened over the past two days accompanied by wheezing and orthopnea. She states that after prolonged bout of coughing, she has noticed chest pain on the center of the chest. Patient arrived in the emergency room with respiratory acidosis and hypercapnia along with hypoxia. She was put on BiPAP. No chest pain or pressure. No palpitations. . No pnd. No dizziness or lightheadedness. No fever or chills. No diaphoresis. No leg swelling. No recent syncopal episodes. No blood in stool or urine. No nausea or vomit. No recent CVA.      Past Medical History:     Past Medical History:   Diagnosis Date    Asthma     CHF (congestive heart failure) (HCC)     Chronic obstructive pulmonary disease (HCC)     Endocrine disease     thyroid issues    Gastrointestinal disorder     \"blockage in my stomach\"    Hypertension          Social History:     Social History     Socioeconomic History    Marital status:      Spouse name: Not on file    Number of children: Not on file    Years of education: Not on file    Highest education level: Not on file   Tobacco Use    Smoking status: Never Smoker    Smokeless tobacco: Never Used   Substance and Sexual Activity    Alcohol use: No     Comment: socially    Drug use: No    Sexual activity: No     Comment: last used 9 years ago        Family History:   History reviewed. No pertinent family history.      Medications:   No Known Allergies     Current Facility-Administered Medications   Medication Dose Route Frequency    heparin 25,000 units in D5W 250 ml infusion  10-25 Units/kg/hr IntraVENous TITRATE    ALPRAZolam (XANAX) tablet 1 mg  1 mg Oral BID    amLODIPine (NORVASC) tablet 10 mg  10 mg Oral DAILY    budesonide-formoterol (SYMBICORT) 160-4.5 mcg/actuation HFA inhaler 2 Puff  2 Puff Inhalation BID RT    famotidine (PEPCID) tablet 20 mg  20 mg Oral DAILY    montelukast (SINGULAIR) tablet 10 mg  10 mg Oral QHS    tiotropium (SPIRIVA) inhalation capsule 18 mcg  1 Cap Inhalation DAILY    sodium chloride (NS) flush 5-40 mL  5-40 mL IntraVENous Q8H    sodium chloride (NS) flush 5-40 mL  5-40 mL IntraVENous PRN    methylPREDNISolone (PF) (Solu-MEDROL) injection 60 mg  60 mg IntraVENous Q6H    azithromycin (ZITHROMAX) 500 mg in 0.9% sodium chloride (MBP/ADV) 250 mL adv  500 mg IntraVENous Q24H    acetaminophen (TYLENOL) tablet 650 mg  650 mg Oral Q4H PRN    albuterol-ipratropium (DUO-NEB) 2.5 MG-0.5 MG/3 ML  3 mL Nebulization Q4H RT    influenza vaccine 2018-19 (6 mos+)(PF) (FLUARIX QUAD/FLULAVAL QUAD) injection 0.5 mL  0.5 mL IntraMUSCular ONCE    acetaminophen (TYLENOL) tablet 1,000 mg  1,000 mg Oral NOW        Review Of Systems:           Constitutional: No fever, no chills, no weight loss, no night sweats   HEENT: No epistaxis, no nasal drainage, no difficulty in swallowing, no redness in eyes  Respiratory: asthma or dyspnea on exertion, cough,   Cardiovascular:  chest pain, chest pressure, dyspnea,  Gastrointestinal: no abd pain, no vomiting, no diarrhea, no bleeding symptoms  Genitourinary: No urinary symptoms or hematuria  Integument/breast: No ulcers or rashes  Musculoskeletal: no muscle pain, no weakness  Neurological: No focal weakness, no seizures, no headaches  Behvioral/Psych: No anxiety, no depression             Physical Exam:     Visit Vitals  /69 (BP 1 Location: Left arm, BP Patient Position: At rest)   Pulse 88   Temp 97.7 °F (36.5 °C)   Resp 18   Wt 94.1 kg (207 lb 6.4 oz)   SpO2 98%   Breastfeeding? No   BMI 41.89 kg/m²     BP Readings from Last 3 Encounters:   03/09/19 147/69   02/27/18 180/90   01/26/18 (!) 149/111     Pulse Readings from Last 3 Encounters:   03/09/19 88   08/23/18 80   02/27/18 75     Wt Readings from Last 3 Encounters:   03/09/19 94.1 kg (207 lb 6.4 oz)   01/26/18 90.7 kg (200 lb)   12/21/17 93.9 kg (207 lb)       General:  alert, cooperative, no distress, appears stated age, moderately obese  Skin: Warm and dry, acyanotic, normal color. Head: Normocephalic, atraumatic. Eyes: Sclerae anicteric, conjunctivae without injection. Neck:  nontender, no nuchal rigidity, no masses, no stridor, no carotid bruit, no JVD  Lungs:  Very diminished breath sounds   Heart:  regular rate and rhythm, S1, S2 normal, no S3 or S4, no rub  Abdomen:  abdomen is soft without significant tenderness, masses, organomegaly or guarding  Extremities:  extremities normal, atraumatic, no cyanosis or edema. Peripheral pulses   Neurological: grossly intact. No focal abnormalities, moves all extremities well. Psychiatric Affect: The patient is awake, alert and oriented x3. Joel Chung is interactive and appropriate.        Data Review:     Recent Results (from the past 48 hour(s))   CBC WITH AUTOMATED DIFF    Collection Time: 03/08/19  6:55 PM   Result Value Ref Range    WBC 7.2 4.6 - 13.2 K/uL    RBC 4.86 4.20 - 5.30 M/uL    HGB 14.1 12.0 - 16.0 g/dL    HCT 43.1 35.0 - 45.0 %    MCV 88.7 74.0 - 97.0 FL    MCH 29.0 24.0 - 34.0 PG    MCHC 32.7 31.0 - 37.0 g/dL    RDW 13.6 11.6 - 14.5 %    PLATELET 493 630 - 792 K/uL    MPV 10.6 9.2 - 11.8 FL    NEUTROPHILS 53 40 - 73 %    LYMPHOCYTES 39 21 - 52 %    MONOCYTES 7 3 - 10 % EOSINOPHILS 1 0 - 5 %    BASOPHILS 0 0 - 2 %    ABS. NEUTROPHILS 3.8 1.8 - 8.0 K/UL    ABS. LYMPHOCYTES 2.8 0.9 - 3.6 K/UL    ABS. MONOCYTES 0.5 0.05 - 1.2 K/UL    ABS. EOSINOPHILS 0.1 0.0 - 0.4 K/UL    ABS. BASOPHILS 0.0 0.0 - 0.1 K/UL    DF AUTOMATED     METABOLIC PANEL, COMPREHENSIVE    Collection Time: 03/08/19  6:55 PM   Result Value Ref Range    Sodium 145 136 - 145 mmol/L    Potassium 4.1 3.5 - 5.5 mmol/L    Chloride 109 (H) 100 - 108 mmol/L    CO2 31 21 - 32 mmol/L    Anion gap 5 3.0 - 18 mmol/L    Glucose 104 (H) 74 - 99 mg/dL    BUN 15 7.0 - 18 MG/DL    Creatinine 1.10 0.6 - 1.3 MG/DL    BUN/Creatinine ratio 14 12 - 20      GFR est AA >60 >60 ml/min/1.73m2    GFR est non-AA 52 (L) >60 ml/min/1.73m2    Calcium 8.5 8.5 - 10.1 MG/DL    Bilirubin, total 0.3 0.2 - 1.0 MG/DL    ALT (SGPT) 19 13 - 56 U/L    AST (SGOT) 14 (L) 15 - 37 U/L    Alk.  phosphatase 116 45 - 117 U/L    Protein, total 6.9 6.4 - 8.2 g/dL    Albumin 3.7 3.4 - 5.0 g/dL    Globulin 3.2 2.0 - 4.0 g/dL    A-G Ratio 1.2 0.8 - 1.7     CARDIAC PANEL,(CK, CKMB & TROPONIN)    Collection Time: 03/08/19  6:55 PM   Result Value Ref Range     26 - 192 U/L    CK - MB 4.3 (H) <3.6 ng/ml    CK-MB Index 3.8 0.0 - 4.0 %    Troponin-I, QT 0.04 0.0 - 0.045 NG/ML   EKG, 12 LEAD, INITIAL    Collection Time: 03/08/19  7:01 PM   Result Value Ref Range    Ventricular Rate 81 BPM    Atrial Rate 81 BPM    P-R Interval 126 ms    QRS Duration 84 ms    Q-T Interval 392 ms    QTC Calculation (Bezet) 455 ms    Calculated P Axis 54 degrees    Calculated R Axis 37 degrees    Calculated T Axis 1 degrees    Diagnosis       Normal sinus rhythm  Septal infarct (cited on or before 23-AUG-2018)  T wave abnormality, consider anterolateral ischemia  Abnormal ECG  When compared with ECG of 23-AUG-2018 10:36,  Nonspecific T wave abnormality now evident in Inferior leads     POC G3    Collection Time: 03/08/19 10:11 PM   Result Value Ref Range    Device: ROOM AIR      FIO2 (POC) 21 %    pH (POC) 7.330 (L) 7.35 - 7.45      pCO2 (POC) 49.4 (H) 35.0 - 45.0 MMHG    pO2 (POC) 71 (L) 80 - 100 MMHG    HCO3 (POC) 26.1 (H) 22 - 26 MMOL/L    sO2 (POC) 93 92 - 97 %    Base deficit (POC) 1 mmol/L    Allens test (POC) YES      Total resp. rate 20      Site LEFT RADIAL      Patient temp. 98.6      Specimen type (POC) ARTERIAL      Performed by Reta Haney    EKG, 12 LEAD, SUBSEQUENT    Collection Time: 03/08/19 10:30 PM   Result Value Ref Range    Ventricular Rate 74 BPM    Atrial Rate 74 BPM    P-R Interval 122 ms    QRS Duration 76 ms    Q-T Interval 444 ms    QTC Calculation (Bezet) 492 ms    Calculated P Axis 55 degrees    Calculated R Axis 42 degrees    Calculated T Axis -49 degrees    Diagnosis       Normal sinus rhythm  Septal infarct (cited on or before 23-AUG-2018)  T wave abnormality, consider inferior ischemia  T wave abnormality, consider anterolateral ischemia  Abnormal ECG  When compared with ECG of 08-MAR-2019 19:01,  Inverted T waves have replaced nonspecific T wave abnormality in Inferior   leads     CARDIAC PANEL,(CK, CKMB & TROPONIN)    Collection Time: 03/08/19 10:52 PM   Result Value Ref Range     26 - 192 U/L    CK - MB 3.7 (H) <3.6 ng/ml    CK-MB Index 3.7 0.0 - 4.0 %    Troponin-I, QT 0.04 0.0 - 0.045 NG/ML   PTT    Collection Time: 03/09/19 12:22 AM   Result Value Ref Range    aPTT 26.7 23.0 - 36.4 SEC   CBC WITH AUTOMATED DIFF    Collection Time: 03/09/19  5:19 AM   Result Value Ref Range    WBC 6.4 4.6 - 13.2 K/uL    RBC 4.72 4.20 - 5.30 M/uL    HGB 13.6 12.0 - 16.0 g/dL    HCT 42.3 35.0 - 45.0 %    MCV 89.6 74.0 - 97.0 FL    MCH 28.8 24.0 - 34.0 PG    MCHC 32.2 31.0 - 37.0 g/dL    RDW 13.6 11.6 - 14.5 %    PLATELET 071 856 - 060 K/uL    MPV 11.4 9.2 - 11.8 FL    NEUTROPHILS 81 (H) 40 - 73 %    LYMPHOCYTES 19 (L) 21 - 52 %    MONOCYTES 0 (L) 3 - 10 %    EOSINOPHILS 0 0 - 5 %    BASOPHILS 0 0 - 2 %    ABS. NEUTROPHILS 5.2 1.8 - 8.0 K/UL    ABS.  LYMPHOCYTES 1.2 0.9 - 3.6 K/UL    ABS. MONOCYTES 0.0 (L) 0.05 - 1.2 K/UL    ABS. EOSINOPHILS 0.0 0.0 - 0.4 K/UL    ABS. BASOPHILS 0.0 0.0 - 0.1 K/UL    DF AUTOMATED     PTT    Collection Time: 03/09/19  6:37 AM   Result Value Ref Range    aPTT 52.5 (H) 23.0 - 36.4 SEC       No intake or output data in the 24 hours ending 03/09/19 0854    Cardiographics:     ECG:Normal sinus rhythm Septal infarct (cited on or before 23-AUG-2018). T wave abnormality, consider inferior ischemia. T wave abnormality, consider anterolateral ischemia. Inverted T waves have replaced nonspecific T wave abnormality in Inferior leads. Echocardiogram: ordered     Signed By: Quirino AGUILAR Phone [phone] supervised    March 9, 2019      I have independently evaluated and examined the patient. All relevant labs and testing data's are reviewed. Care plan discussed and updated after review.     Ab Cain MD

## 2019-03-09 NOTE — PROGRESS NOTES
[facility]ist Group  Progress Note    Patient: Severa Schlossman Age: 46 y.o. : 1965 MR#: [id number] SSN: [id number]  Date/Time: 3/9/2019     Subjective:     Review of systems    No CP   NO NVD  No SOB  NO Cough     C/o generalized discomfort     Wants - Morphine/dilaudid     Assessment/Plan:   1. COPD acute / asthma overlap / Acute hypoxic hypercarbic respiratory failure   - s/b pulmonary   - continue o2   - steroids   - BD     2 severe myalgia   - likely from severe coughing   - percocet   Avoid stronger narcotics     3 h/o opiates positive in urine     4 Right thyroid enlargement and multinodular goiter,     5 Mild trop elevation   - ECG changes ? Non specific   - continue heparin till evaluated by cardiology      full CODE     Case discussed with:  [x]Patient  [] Family ( In room with patient )    []Nursing  []Case Management  DVT Prophylaxis:  []Lovenox  []Hep SQ  []SCDs  []Coumadin   []On Heparin gtt          Objective:   VS:   Visit Vitals  /75 (BP 1 Location: Left arm, BP Patient Position: At rest)   Pulse 90   Temp 98 °F (36.7 °C)   Resp 18   Wt 94.1 kg (207 lb 6.4 oz)   SpO2 98%   Breastfeeding? No   BMI 41.89 kg/m²      Tmax/24hrs: Temp (24hrs), Av.2 °F (36.8 °C), Min:97.7 °F (36.5 °C), Max:98.7 °F (37.1 °C)  IOBRIEFNo intake or output data in the 24 hours ending 19 1331    General:  Alert, cooperative, no acute distress   Cardiovascular: S1S2 - regular , No Murmur   Pulmonary: Equal expansion , No Use of accessory muscles , No Rales , Bilateral rhonchi ++  GI:  +BS in all four quadrants, soft, non-tender  Extremities:  No edema; 2+ dorsalis pedis pulses bilaterally  Neuro: Alert and oriented X 2.        Medications:   Current Facility-Administered Medications   Medication Dose Route Frequency    heparin 25,000 units in D5W 250 ml infusion  10-25 Units/kg/hr IntraVENous TITRATE    ALPRAZolam (XANAX) tablet 1 mg  1 mg Oral BID    amLODIPine (NORVASC) tablet 10 mg  10 mg Oral DAILY    famotidine (PEPCID) tablet 20 mg  20 mg Oral DAILY    montelukast (SINGULAIR) tablet 10 mg  10 mg Oral QHS    tiotropium (SPIRIVA) inhalation capsule 18 mcg  1 Cap Inhalation DAILY    sodium chloride (NS) flush 5-40 mL  5-40 mL IntraVENous Q8H    sodium chloride (NS) flush 5-40 mL  5-40 mL IntraVENous PRN    azithromycin (ZITHROMAX) 500 mg in 0.9% sodium chloride (MBP/ADV) 250 mL adv  500 mg IntraVENous Q24H    acetaminophen (TYLENOL) tablet 650 mg  650 mg Oral Q4H PRN    influenza vaccine 2018-19 (6 mos+)(PF) (FLUARIX QUAD/FLULAVAL QUAD) injection 0.5 mL  0.5 mL IntraMUSCular ONCE    aspirin chewable tablet 81 mg  81 mg Oral DAILY    atorvastatin (LIPITOR) tablet 20 mg  20 mg Oral QHS    albuterol CONCENTRATE 2.5mg/0.5 mL neb soln  2.5 mg Nebulization Q6H RT    methylPREDNISolone (PF) (Solu-MEDROL) injection 80 mg  80 mg IntraVENous Q6H    budesonide (PULMICORT) 500 mcg/2 ml nebulizer suspension  500 mcg Nebulization BID RT    arformoterol (BROVANA) neb solution 15 mcg  15 mcg Nebulization BID RT    roflumilast (DALIRESP) tablet 500 mcg  500 mcg Oral DAILY    oxyCODONE-acetaminophen (PERCOCET) 5-325 mg per tablet 2 Tab  2 Tab Oral Q8H PRN       Labs:    Recent Labs     03/09/19  0519 03/08/19  1855   WBC 6.4 7.2   HGB 13.6 14.1   HCT 42.3 43.1    246     Recent Labs     03/08/19  1855      K 4.1   *   CO2 31   *   BUN 15   CREA 1.10   CA 8.5   ALB 3.7   SGOT 14*   ALT 19         Signed By: Jeri Flores MD     March 9, 2019

## 2019-03-09 NOTE — ED NOTES
Pt refused Tylenol for pain. Pt states she \"only takes Percocet and Morphine for pain and that's in my records\". PT states that her pain is beyond a ten on numerical pain scale, but refuses to say where she is having pain because that is in her records.

## 2019-03-10 ENCOUNTER — APPOINTMENT (OUTPATIENT)
Dept: NON INVASIVE DIAGNOSTICS | Age: 54
DRG: 140 | End: 2019-03-10
Attending: NURSE PRACTITIONER
Payer: MEDICAID

## 2019-03-10 LAB
APTT PPP: 60 SEC (ref 23–36.4)
APTT PPP: 67.9 SEC (ref 23–36.4)
BASOPHILS # BLD: 0 K/UL (ref 0–0.1)
BASOPHILS NFR BLD: 0 % (ref 0–2)
DIFFERENTIAL METHOD BLD: ABNORMAL
ECHO AO ROOT DIAM: 2.83 CM
ECHO LA VOL BP: 50.23 ML (ref 22–52)
ECHO LA VOL/BSA BIPLANE: 26.63 ML/M2 (ref 16–28)
ECHO LV E' LATERAL VELOCITY: 8.67 CM/S
ECHO LV E' SEPTAL VELOCITY: 6.67 CM/S
ECHO LV INTERNAL DIMENSION DIASTOLIC: 3.8 CM (ref 3.9–5.3)
ECHO LV INTERNAL DIMENSION SYSTOLIC: 2.1 CM
ECHO LV IVSD: 1.8 CM (ref 0.6–0.9)
ECHO LV POSTERIOR WALL DIASTOLIC: 1.9 CM (ref 0.6–0.9)
ECHO PULMONARY ARTERY SYSTOLIC PRESSURE (PASP): 40 MMHG
ECHO RV TAPSE: 3.62 CM (ref 1.5–2)
ECHO TV REGURGITANT MAX VELOCITY: 305 CM/S
ECHO TV REGURGITANT PEAK GRADIENT: 37.2 MMHG
EOSINOPHIL # BLD: 0 K/UL (ref 0–0.4)
EOSINOPHIL NFR BLD: 0 % (ref 0–5)
ERYTHROCYTE [DISTWIDTH] IN BLOOD BY AUTOMATED COUNT: 13.7 % (ref 11.6–14.5)
HCT VFR BLD AUTO: 40.4 % (ref 35–45)
HGB BLD-MCNC: 13.2 G/DL (ref 12–16)
LYMPHOCYTES # BLD: 1.3 K/UL (ref 0.9–3.6)
LYMPHOCYTES NFR BLD: 12 % (ref 21–52)
MCH RBC QN AUTO: 28.9 PG (ref 24–34)
MCHC RBC AUTO-ENTMCNC: 32.7 G/DL (ref 31–37)
MCV RBC AUTO: 88.4 FL (ref 74–97)
MONOCYTES # BLD: 0.3 K/UL (ref 0.05–1.2)
MONOCYTES NFR BLD: 2 % (ref 3–10)
NEUTS SEG # BLD: 9.4 K/UL (ref 1.8–8)
NEUTS SEG NFR BLD: 86 % (ref 40–73)
PLATELET # BLD AUTO: 231 K/UL (ref 135–420)
PMV BLD AUTO: 11 FL (ref 9.2–11.8)
RBC # BLD AUTO: 4.57 M/UL (ref 4.2–5.3)
WBC # BLD AUTO: 10.9 K/UL (ref 4.6–13.2)

## 2019-03-10 PROCEDURE — 85730 THROMBOPLASTIN TIME PARTIAL: CPT

## 2019-03-10 PROCEDURE — 74011250636 HC RX REV CODE- 250/636: Performed by: HOSPITALIST

## 2019-03-10 PROCEDURE — 94640 AIRWAY INHALATION TREATMENT: CPT

## 2019-03-10 PROCEDURE — 74011250637 HC RX REV CODE- 250/637: Performed by: HOSPITALIST

## 2019-03-10 PROCEDURE — 36415 COLL VENOUS BLD VENIPUNCTURE: CPT

## 2019-03-10 PROCEDURE — 65660000000 HC RM CCU STEPDOWN

## 2019-03-10 PROCEDURE — 74011250636 HC RX REV CODE- 250/636: Performed by: INTERNAL MEDICINE

## 2019-03-10 PROCEDURE — 74011250637 HC RX REV CODE- 250/637: Performed by: INTERNAL MEDICINE

## 2019-03-10 PROCEDURE — 85025 COMPLETE CBC W/AUTO DIFF WBC: CPT

## 2019-03-10 PROCEDURE — 94761 N-INVAS EAR/PLS OXIMETRY MLT: CPT

## 2019-03-10 PROCEDURE — 93306 TTE W/DOPPLER COMPLETE: CPT

## 2019-03-10 PROCEDURE — 74011250636 HC RX REV CODE- 250/636: Performed by: STUDENT IN AN ORGANIZED HEALTH CARE EDUCATION/TRAINING PROGRAM

## 2019-03-10 PROCEDURE — 74011250637 HC RX REV CODE- 250/637: Performed by: NURSE PRACTITIONER

## 2019-03-10 PROCEDURE — 74011000250 HC RX REV CODE- 250: Performed by: INTERNAL MEDICINE

## 2019-03-10 RX ORDER — CLONIDINE HYDROCHLORIDE 0.1 MG/1
0.1 TABLET ORAL 2 TIMES DAILY
Status: DISCONTINUED | OUTPATIENT
Start: 2019-03-10 | End: 2019-03-13

## 2019-03-10 RX ORDER — HEPARIN SODIUM 1000 [USP'U]/ML
3000 INJECTION, SOLUTION INTRAVENOUS; SUBCUTANEOUS ONCE
Status: COMPLETED | OUTPATIENT
Start: 2019-03-10 | End: 2019-03-10

## 2019-03-10 RX ORDER — OXYCODONE AND ACETAMINOPHEN 10; 325 MG/1; MG/1
2 TABLET ORAL
Status: DISCONTINUED | OUTPATIENT
Start: 2019-03-10 | End: 2019-03-14 | Stop reason: HOSPADM

## 2019-03-10 RX ADMIN — MONTELUKAST SODIUM 10 MG: 10 TABLET, COATED ORAL at 23:36

## 2019-03-10 RX ADMIN — METHYLPREDNISOLONE SODIUM SUCCINATE 80 MG: 40 INJECTION, POWDER, FOR SOLUTION INTRAMUSCULAR; INTRAVENOUS at 18:44

## 2019-03-10 RX ADMIN — OXYCODONE HYDROCHLORIDE AND ACETAMINOPHEN 2 TABLET: 5; 325 TABLET ORAL at 03:46

## 2019-03-10 RX ADMIN — Medication 81 MG: at 11:58

## 2019-03-10 RX ADMIN — OXYCODONE HYDROCHLORIDE AND ACETAMINOPHEN 2 TABLET: 5; 325 TABLET ORAL at 11:58

## 2019-03-10 RX ADMIN — HEPARIN SODIUM 3000 UNITS: 1000 INJECTION INTRAVENOUS; SUBCUTANEOUS at 12:49

## 2019-03-10 RX ADMIN — FAMOTIDINE 20 MG: 20 TABLET ORAL at 11:58

## 2019-03-10 RX ADMIN — TIOTROPIUM BROMIDE 18 MCG: 18 CAPSULE ORAL; RESPIRATORY (INHALATION) at 09:32

## 2019-03-10 RX ADMIN — METHYLPREDNISOLONE SODIUM SUCCINATE 80 MG: 40 INJECTION, POWDER, FOR SOLUTION INTRAMUSCULAR; INTRAVENOUS at 23:36

## 2019-03-10 RX ADMIN — ALPRAZOLAM 1 MG: 0.5 TABLET ORAL at 18:44

## 2019-03-10 RX ADMIN — ALBUTEROL SULFATE 2.5 MG: 2.5 SOLUTION RESPIRATORY (INHALATION) at 09:32

## 2019-03-10 RX ADMIN — ARFORMOTEROL TARTRATE 15 MCG: 15 SOLUTION RESPIRATORY (INHALATION) at 09:32

## 2019-03-10 RX ADMIN — Medication 10 ML: at 12:57

## 2019-03-10 RX ADMIN — ALBUTEROL SULFATE 2.5 MG: 2.5 SOLUTION RESPIRATORY (INHALATION) at 14:16

## 2019-03-10 RX ADMIN — METHYLPREDNISOLONE SODIUM SUCCINATE 80 MG: 40 INJECTION, POWDER, FOR SOLUTION INTRAMUSCULAR; INTRAVENOUS at 12:03

## 2019-03-10 RX ADMIN — HEPARIN SODIUM AND DEXTROSE 16.22 UNITS/KG/HR: 10000; 5 INJECTION INTRAVENOUS at 18:43

## 2019-03-10 RX ADMIN — Medication 10 ML: at 16:31

## 2019-03-10 RX ADMIN — OXYCODONE AND ACETAMINOPHEN 2 TABLET: 10; 325 TABLET ORAL at 23:38

## 2019-03-10 RX ADMIN — Medication 10 ML: at 23:43

## 2019-03-10 RX ADMIN — CLONIDINE HYDROCHLORIDE 0.1 MG: 0.1 TABLET ORAL at 16:23

## 2019-03-10 RX ADMIN — BUDESONIDE 500 MCG: 0.5 INHALANT RESPIRATORY (INHALATION) at 09:32

## 2019-03-10 RX ADMIN — METHYLPREDNISOLONE SODIUM SUCCINATE 80 MG: 40 INJECTION, POWDER, FOR SOLUTION INTRAMUSCULAR; INTRAVENOUS at 05:50

## 2019-03-10 RX ADMIN — ROFLUMILAST 500 MCG: 500 TABLET ORAL at 11:58

## 2019-03-10 RX ADMIN — ALPRAZOLAM 1 MG: 0.5 TABLET ORAL at 11:58

## 2019-03-10 RX ADMIN — ATORVASTATIN CALCIUM 20 MG: 20 TABLET, FILM COATED ORAL at 23:36

## 2019-03-10 RX ADMIN — SODIUM CHLORIDE 500 MG: 900 INJECTION, SOLUTION INTRAVENOUS at 03:08

## 2019-03-10 NOTE — PROGRESS NOTES
[facility]ist [facility]  Progress Note    Patient: Micaela Hill Age: 46 y.o. : 1965 MR#: [id number] SSN: [id number]  Date/Time: 3/10/2019     Subjective:     Review of systems    No CP   NO NVD  No SOB  NO Cough     More concerned regarding her pain management   Upper body ache   Sob+  No distress at rest     Assessment/Plan:   1. COPD acute / asthma overlap / Acute hypoxic hypercarbic respiratory failure   -- continue BD + steroids   - O2 supplementation   Follow with pulmonary     2 severe myalgia   - Adjust the dose of Percocet   - due to pain she is showing more anxiety     3 h/o opiates positive in urine     4 Right thyroid enlargement and multinodular goiter,     5 Mild trop elevation   - s/b cardiology   -  T inversion in lateral and inferior leads are significant   - continue IV heparin   - Trop trended flat   - possible cath - when she is stable from pulmonary        full CODE     Case discussed with:  [x]Patient  [] Family ( In room with patient )    []Nursing  []Case Management  DVT Prophylaxis:  []Lovenox  []Hep SQ  []SCDs  []Coumadin   [x]On Heparin gtt          Objective:   VS:   Visit Vitals  /72   Pulse 97   Temp 98.2 °F (36.8 °C)   Resp 20   Ht 4' 11\" (1.499 m)   Wt 95.7 kg (211 lb)   SpO2 95%   Breastfeeding? No   BMI 42.62 kg/m²      Tmax/24hrs: Temp (24hrs), Av °F (36.7 °C), Min:97 °F (36.1 °C), Max:98.4 °F (36.9 °C)  IOBRIEF    Intake/Output Summary (Last 24 hours) at 3/10/2019 1421  Last data filed at 3/10/2019 8725  Gross per 24 hour   Intake 2340.1 ml   Output    Net 2340.1 ml       General:  Alert, cooperative, no acute distress   Cardiovascular: S1S2 - regular , No Murmur   Pulmonary: Equal expansion , No Use of accessory muscles , No Rales , Bilateral rhonchi ++  GI:  +BS in all four quadrants, soft, non-tender  Extremities:  No edema; 2+ dorsalis pedis pulses bilaterally  Neuro: Alert and oriented X 2.        Medications:   Current Facility-Administered Medications   Medication Dose Route Frequency    oxyCODONE-acetaminophen (PERCOCET 10)  mg per tablet 2 Tab  2 Tab Oral Q8H PRN    heparin 25,000 units in D5W 250 ml infusion  10-25 Units/kg/hr IntraVENous TITRATE    ALPRAZolam (XANAX) tablet 1 mg  1 mg Oral BID    amLODIPine (NORVASC) tablet 10 mg  10 mg Oral DAILY    famotidine (PEPCID) tablet 20 mg  20 mg Oral DAILY    montelukast (SINGULAIR) tablet 10 mg  10 mg Oral QHS    tiotropium (SPIRIVA) inhalation capsule 18 mcg  1 Cap Inhalation DAILY    sodium chloride (NS) flush 5-40 mL  5-40 mL IntraVENous Q8H    sodium chloride (NS) flush 5-40 mL  5-40 mL IntraVENous PRN    azithromycin (ZITHROMAX) 500 mg in 0.9% sodium chloride (MBP/ADV) 250 mL adv  500 mg IntraVENous Q24H    acetaminophen (TYLENOL) tablet 650 mg  650 mg Oral Q4H PRN    aspirin chewable tablet 81 mg  81 mg Oral DAILY    atorvastatin (LIPITOR) tablet 20 mg  20 mg Oral QHS    albuterol CONCENTRATE 2.5mg/0.5 mL neb soln  2.5 mg Nebulization Q6H RT    methylPREDNISolone (PF) (Solu-MEDROL) injection 80 mg  80 mg IntraVENous Q6H    budesonide (PULMICORT) 500 mcg/2 ml nebulizer suspension  500 mcg Nebulization BID RT    arformoterol (BROVANA) neb solution 15 mcg  15 mcg Nebulization BID RT    roflumilast (DALIRESP) tablet 500 mcg  500 mcg Oral DAILY       Labs:    Recent Labs     03/10/19  0019 03/09/19  0519 03/08/19  1855   WBC 10.9 6.4 7.2   HGB 13.2 13.6 14.1   HCT 40.4 42.3 43.1    252 246     Recent Labs     03/08/19  1855      K 4.1   *   CO2 31   *   BUN 15   CREA 1.10   CA 8.5   ALB 3.7   SGOT 14*   ALT 19         Signed By: Arlene Hurst MD     March 10, 2019

## 2019-03-10 NOTE — PROGRESS NOTES
Problem: Falls - Risk of  Goal: *Absence of Falls  Document Delroy Fall Risk and appropriate interventions in the flowsheet. Outcome: Progressing Towards Goal  Fall Risk Interventions:                      History of Falls Interventions: Room close to nurse's station        Problem: Pressure Injury - Risk of  Goal: *Prevention of pressure injury  Document Darrell Scale and appropriate interventions in the flowsheet.   Outcome: Progressing Towards Goal  Pressure Injury Interventions:  Sensory Interventions: Pressure redistribution bed/mattress (bed type)    Moisture Interventions: Check for incontinence Q2 hours and as needed    Activity Interventions: Increase time out of bed    Mobility Interventions: Pressure redistribution bed/mattress (bed type)    Nutrition Interventions: Offer support with meals,snacks and hydration

## 2019-03-10 NOTE — CONSULTS
[facility]  Pulmonary, Critical Care, and Sleep Medicine    Name: Nova Mims MRN: [id number]   : 1965 Hospital: [facility]   Date: 3/10/2019        Pulmonary Follow-up In-Patient Consult                                              Consult requesting physician: Dr. Nohelia Olivo  Reason for Consult: COPD Exacerbation    IMPRESSION:   · COPD/asthma overlap with very poor control and frequent admissions for exacerbations now with moderate to severe exacerbation with concern for ACS as well. · MY - non-compliant; unclear the pressure settings  · Obesity  · HTN  · HF  · Tobacco abuse        RECOMMENDATIONS:   · Continue solumedrol at current dose  · Continue pulmicort and brovana nebs  · Continue spiriva but will switch duoneb to albuterol to avoid too much anti-cholinergic effect  · Start CPAP at empiric pressure of 12 cm H20  · Continue Daliresp to address frequent AE and obestiy  · Nutrition: per primary  · Replace electrolytes  · HOB >=30 degree, aggressive pulmonary toileting, incentive spirometry, PT/OT eval and treat  · GI Prophylaxis: per primary team    · DVT Prophylaxis: per primary team    · Smoking cessation counseling done by me and spent >11 minutes on it. · Further recommendations will be based on the patient's response to recommended treatment and results of the investigation ordered. Subjective/History:     Nova Mims is a 46 y.o. female with PMHx significant for  COPD/asthma overlap, Obesity, MY and CHF who presents with difficulty breathing. Pt reports one week of worsening COPD symptoms including wheezing and trouble breathing. Her trigger is perfumes and smells. She has chest tightness \"from working so hard to breath\". [facility] was [facility] about 3 weeks ago following a 9 day hospital stay for the same presentation. Notes that she still smokes occasionally. MY but not using CPAP consistently.   Concern for ACS so cardiology consulting and possible plan for [facility] once COPD exacerbation resolving. 3/10:   Modest improvement this am  Lung exam still with significant wheezing      No Known Allergies     Past Medical History:   Diagnosis Date    Asthma     CHF (congestive heart failure) (HCC)     Chronic obstructive pulmonary disease (HCC)     Endocrine disease     thyroid issues    Gastrointestinal disorder     \"blockage in my stomach\"    Hypertension         Social History     Tobacco Use    Smoking status: Never Smoker    Smokeless tobacco: Never Used   Substance Use Topics    Alcohol use: No     Comment: socially          Prior to Admission medications    Medication Sig Start Date End Date Taking? Authorizing Provider   budesonide-formoterol (SYMBICORT) 160-4.5 mcg/actuation HFAA Take 2 Puffs by inhalation two (2) times a day. 8/23/18   Jarred Zepeda R, DO   tiotropium (SPIRIVA) 18 mcg inhalation capsule Take 1 Cap by inhalation daily. 8/23/18   Jarred Zepeda R, DO   budesonide (PULMICORT) 0.5 mg/2 mL nbsp 2 mL by Nebulization route two (2) times a day. 8/23/18   Dot Pantoja, DO   albuterol (PROAIR HFA) 90 mcg/actuation inhaler Take 2 Puffs by inhalation every four (4) hours as needed for Wheezing. 8/23/18   Jarred TAFOYA, DO   albuterol (PROVENTIL VENTOLIN) 2.5 mg /3 mL (0.083 %) nebulizer solution 3 mL by Nebulization route every four (4) hours as needed for Wheezing. 1/26/18   Marybeth Peres,    ALPRAZolam Alfornia Woodward) 1 mg tablet Take 1 Tab by mouth two (2) times a day. Max Daily Amount: 2 mg. 4/24/17   Denny Guajardo MD   oxyCODONE-acetaminophen (PERCOCET 10)  mg per tablet Take 1 Tab by mouth every eight (8) hours as needed. Max Daily Amount: 3 Tabs. 4/24/17   Denny Guajardo MD   amLODIPine (NORVASC) 10 mg tablet Take 1 Tab by mouth daily. 3/9/17   Mali Jaimes MD   bumetanide (BUMEX) 0.5 mg tablet Take 1 Tab by mouth every fourty-eight (48) hours.  3/9/17   Mali Jaimes MD   famotidine (PEPCID) 20 mg tablet Take 1 Tab by mouth daily. 3/9/17   Jorge Roa MD   FLUoxetine (PROZAC) 20 mg capsule Take 3 Caps by mouth daily. 3/9/17   Jorge Roa MD   montelukast (SINGULAIR) 10 mg tablet Take 1 Tab by mouth nightly. 3/9/17   Jorge Roa MD   arformoterol (BROVANA) 15 mcg/2 mL nebu neb solution 2 mL by Nebulization route two (2) times a day. 3/9/17   Jorge Roa MD   hydrALAZINE (APRESOLINE) 50 mg tablet Take 1 Tab by mouth three (3) times daily.  3/9/17   Jorge Roa MD       Current Facility-Administered Medications   Medication Dose Route Frequency    heparin 25,000 units in D5W 250 ml infusion  10-25 Units/kg/hr IntraVENous TITRATE    ALPRAZolam (XANAX) tablet 1 mg  1 mg Oral BID    amLODIPine (NORVASC) tablet 10 mg  10 mg Oral DAILY    famotidine (PEPCID) tablet 20 mg  20 mg Oral DAILY    montelukast (SINGULAIR) tablet 10 mg  10 mg Oral QHS    tiotropium (SPIRIVA) inhalation capsule 18 mcg  1 Cap Inhalation DAILY    sodium chloride (NS) flush 5-40 mL  5-40 mL IntraVENous Q8H    sodium chloride (NS) flush 5-40 mL  5-40 mL IntraVENous PRN    azithromycin (ZITHROMAX) 500 mg in 0.9% sodium chloride (MBP/ADV) 250 mL adv  500 mg IntraVENous Q24H    acetaminophen (TYLENOL) tablet 650 mg  650 mg Oral Q4H PRN    aspirin chewable tablet 81 mg  81 mg Oral DAILY    atorvastatin (LIPITOR) tablet 20 mg  20 mg Oral QHS    albuterol CONCENTRATE 2.5mg/0.5 mL neb soln  2.5 mg Nebulization Q6H RT    methylPREDNISolone (PF) (Solu-MEDROL) injection 80 mg  80 mg IntraVENous Q6H    budesonide (PULMICORT) 500 mcg/2 ml nebulizer suspension  500 mcg Nebulization BID RT    arformoterol (BROVANA) neb solution 15 mcg  15 mcg Nebulization BID RT    roflumilast (DALIRESP) tablet 500 mcg  500 mcg Oral DAILY    oxyCODONE-acetaminophen (PERCOCET) 5-325 mg per tablet 2 Tab  2 Tab Oral Q8H PRN         Objective:   Vital Signs:    Visit Vitals  /77 (BP 1 Location: Right arm, BP Patient Position: At rest)   Pulse 66   Temp 97 °F (36.1 °C)   Resp 20   Wt 95.9 kg (211 lb 8 oz)   SpO2 95%   Breastfeeding? No   BMI 42.72 kg/m²       O2 Device: Room air   O2 Flow Rate (L/min): 2 l/min   Temp (24hrs), Av °F (36.7 °C), Min:97 °F (36.1 °C), Max:98.4 °F (36.9 °C)       Intake/Output:   Last shift:      No intake/output data recorded. Last 3 shifts:  1901 - 03/10 0700  In: 2340.1 [P.O.:1500; I.V.:840.1]  Out: -     Intake/Output Summary (Last 24 hours) at 3/10/2019 0835  Last data filed at 3/10/2019 4951  Gross per 24 hour   Intake 2340.1 ml   Output    Net 2340.1 ml       Physical Exam:     General:  Alert, Awake, NAD, cooperative, no distress, appears stated age. Head:   Normocephalic, without obvious abnormality, atraumatic. Eye:   Conjunctivae/corneas clear. PERRLA, no scleral icterus, no pallor, no cyanosis  Nose:   Nares normal. Septum midline. Mucosa normal without erythema/exudate. No drainage/discharge. No sinus tenderness. Throat:  Lips, mucosa, and tongue normal. Teeth and gums normal. No tonsillar enlargement, no erythema, no exudates, no oral thrush  Neck:   Supple, symmetric, thyroid: no enlargement/tenderness/nodule, no JVD, no carotid bruit, no lymphadenopathy. Trachea midline  Back & spine: Symmetric, no curvature. Chest wall: No tenderness or deformity. No rash  Lung:   Breath sounds with good movement heard throughout with end expiratory wheezes  Heart:   Regular rate & rhythm. S1 S2 present, no murmur, no gallop, no click, no rub  Abdomen:  Soft, NT, ND, +BS, no masses, no organomegaly  Extremities:  No pedal edema, no cyanosis, no clubbing  Pulses: 2+ and symmetric in DP  Lymphatic:  No cervical, supraclavicular and axillary palpable lymphadenopathy. Musculoskeletal: No joint swelling or tenderness.   Neurologic:  Grossly non focal.        Data:         Chemistry Recent Labs     19  1855   *      K 4.1   *   CO2 31   BUN 15   CREA 1.10   CA 8.5   AGAP 5   BUCR 14    TP 6.9   ALB 3.7   GLOB 3.2   AGRAT 1.2        Lactic Acid Lactic acid   Date Value Ref Range Status   01/13/2017 1.4 0.4 - 2.0 MMOL/L Final     No results for input(s): LAC in the last 72 hours. Liver Enzymes Protein, total   Date Value Ref Range Status   03/08/2019 6.9 6.4 - 8.2 g/dL Final     Albumin   Date Value Ref Range Status   03/08/2019 3.7 3.4 - 5.0 g/dL Final     Globulin   Date Value Ref Range Status   03/08/2019 3.2 2.0 - 4.0 g/dL Final     A-G Ratio   Date Value Ref Range Status   03/08/2019 1.2 0.8 - 1.7   Final     AST (SGOT)   Date Value Ref Range Status   03/08/2019 14 (L) 15 - 37 U/L Final     Alk.  phosphatase   Date Value Ref Range Status   03/08/2019 116 45 - 117 U/L Final     Recent Labs     03/08/19  1855   TP 6.9   ALB 3.7   GLOB 3.2   AGRAT 1.2   SGOT 14*           CBC w/Diff Recent Labs     03/10/19  0019 03/09/19  0519 03/08/19  1855   WBC 10.9 6.4 7.2   RBC 4.57 4.72 4.86   HGB 13.2 13.6 14.1   HCT 40.4 42.3 43.1    252 246   GRANS 86* 81* 53   LYMPH 12* 19* 39   EOS 0 0 1        Cardiac Enzymes No results found for: CPK, CK, CKMMB, CKMB, RCK3, CKMBT, CKNDX, CKND1, JOSE, TROPT, TROIQ, SURY, TROPT, TNIPOC, BNP, BNPP     BNP Lab Results   Component Value Date/Time    BNP 19.9 12/07/2015 05:23 PM    BNP 3.4 11/01/2015 03:20 PM        Coagulation Recent Labs     03/10/19  0019 03/09/19  1723 03/09/19  0637   APTT 67.9* 68.6* 52.5*         Thyroid  Lab Results   Component Value Date/Time    TSH 0.07 (L) 04/18/2017 10:44 AM          Lipid Panel No results found for: CHOL, CHOLPOCT, CHOLX, CHLST, CHOLV, 738367, HDL, LDL, LDLC, DLDLP, 977644, VLDLC, VLDL, TGLX, TRIGL, TRIGP, TGLPOCT, CHHD, CHHDX     ABG Recent Labs     03/08/19  2211   PHI 7.330*   PCO2I 49.4*   PO2I 71*   HCO3I 26.1*   FIO2I 21        Urinalysis Lab Results   Component Value Date/Time    Color YELLOW 04/18/2017 02:30 PM    Appearance CLEAR 04/18/2017 02:30 PM    Specific gravity 1.016 04/18/2017 02:30 PM    pH (UA) 5.5 04/18/2017 02:30 PM    Protein NEGATIVE  04/18/2017 02:30 PM    Glucose NEGATIVE  04/18/2017 02:30 PM    Ketone 40 (A) 04/18/2017 02:30 PM    Bilirubin NEGATIVE  04/18/2017 02:30 PM    Urobilinogen 1.0 04/18/2017 02:30 PM    Nitrites NEGATIVE  04/18/2017 02:30 PM    Leukocyte Esterase NEGATIVE  04/18/2017 02:30 PM    Epithelial cells FEW 01/11/2017 06:24 PM    Bacteria FEW (A) 01/11/2017 06:24 PM    WBC NONE 01/11/2017 06:24 PM    RBC NONE 01/11/2017 06:24 PM        Micro  No results for input(s): SDES, CULT in the last 72 hours. No results for input(s): CULT in the last 72 hours. XR (Most Recent). CXR reviewed by me and compared with previous CXR Results from Hospital Encounter encounter on 03/08/19   XR CHEST PORT    Narrative EXAMINATION: Chest single view    INDICATION: COPD exacerbation    COMPARISON: 2/27/2018    FINDINGS: Single frontal view of the chest obtained. Underpenetration limits  evaluation. Borderline prominent cardiac silhouette. Prominent perihilar  interstitium. Minimal aortic arch calcifications. No evidence of pneumothorax. No acute osseous findings. Impression IMPRESSION:    Borderline prominent cardiac silhouette with suspected mild interstitial  infiltrate/edema. CT (Most Recent) Results from Hospital Encounter encounter on 12/19/17   CT CHEST W CONT    Narrative CT CHEST WITH ENHANCEMENT    INDICATION: History of COPD, asthma, congestive heart failure with one week of  constant chest tightness, recent hospitalization for pneumonia completed  antibiotics and steroids without improvement. TECHNIQUE: Axial images obtained from the thoracic inlet to the level of the  diaphragm following uneventful administration of 100 cc Isovue-300 nonionic  intravenous contrast. Coronal and sagittal reformatted images were obtained.     All CT scans at this facility are performed using dose optimization technique as  appropriate to a performed exam, to include automated exposure control,  adjustment of the mA and/or kV according to patient size (including appropriate  matching first site-specific examinations), or use of iterative reconstruction  technique. COMPARISON: CT chest 1/26/2017. CHEST FINDINGS:     Thyroid: Similar right thyroid enlargement with likely multifocal nodules  measuring up to 2 cm. Pericardium/ Heart: Heart size is normal. No pericardial effusion. No definite  calcified coronary arteriosclerosis. Aorta/ Vessels: No aneurysm or dissection. Lymph Nodes: Unremarkable. .    Lungs: Trachea and central bronchial tree are patent. Similar mild subsegmental  atelectasis or scarring in the left lower lobe. No suspicious pulmonary nodule,  consolidation or mass. Pleura: No effusion. Upper Abdomen: Unchanged hepatic hypodensities, the largest is 1.6 cm with cyst  density. Gallbladder is decompressed. 1.4 cm left upper pole renal cyst.    Bones/soft tissues: Unremarkable. Impression IMPRESSION:    No acute findings to explain patient's symptoms. Right thyroid enlargement and multinodular goiter, previously and better  evaluated with ultrasound. EKG No results found for this or any previous visit. ECHO No results found for this or any previous visit. PFT No flowsheet data found.      Other ASA reactivity:   Pre-albumin:   Ionized Calcium:   NH4:   T3, FT4:  Cortisol:  Urine Osm:  Urine Lytes:   HbA1c:      Recent Results (from the past 24 hour(s))   EKG, 12 LEAD, SUBSEQUENT    Collection Time: 03/09/19  1:36 PM   Result Value Ref Range    Ventricular Rate 80 BPM    Atrial Rate 80 BPM    P-R Interval 132 ms    QRS Duration 76 ms    Q-T Interval 404 ms    QTC Calculation (Bezet) 465 ms    Calculated P Axis 87 degrees    Calculated R Axis 96 degrees    Calculated T Axis -89 degrees    Diagnosis       Normal sinus rhythm  Rightward axis  Anterior infarct (cited on or before 23-AUG-2018)  T wave abnormality, consider inferolateral ischemia  Abnormal ECG  When compared with ECG of 08-MAR-2019 22:30,  No significant change was found  Confirmed by Asya Hanks ([id number]) on 3/9/2019 2:52:41 PM     PTT    Collection Time: 03/09/19  5:23 PM   Result Value Ref Range    aPTT 68.6 (H) 23.0 - 36.4 SEC   NT-PRO BNP    Collection Time: 03/09/19  5:23 PM   Result Value Ref Range    NT pro- 0 - 900 PG/ML   PTT    Collection Time: 03/10/19 12:19 AM   Result Value Ref Range    aPTT 67.9 (H) 23.0 - 36.4 SEC   CBC WITH AUTOMATED DIFF    Collection Time: 03/10/19 12:19 AM   Result Value Ref Range    WBC 10.9 4.6 - 13.2 K/uL    RBC 4.57 4.20 - 5.30 M/uL    HGB 13.2 12.0 - 16.0 g/dL    HCT 40.4 35.0 - 45.0 %    MCV 88.4 74.0 - 97.0 FL    MCH 28.9 24.0 - 34.0 PG    MCHC 32.7 31.0 - 37.0 g/dL    RDW 13.7 11.6 - 14.5 %    PLATELET 714 718 - 801 K/uL    MPV 11.0 9.2 - 11.8 FL    NEUTROPHILS 86 (H) 40 - 73 %    LYMPHOCYTES 12 (L) 21 - 52 %    MONOCYTES 2 (L) 3 - 10 %    EOSINOPHILS 0 0 - 5 %    BASOPHILS 0 0 - 2 %    ABS. NEUTROPHILS 9.4 (H) 1.8 - 8.0 K/UL    ABS. LYMPHOCYTES 1.3 0.9 - 3.6 K/UL    ABS. MONOCYTES 0.3 0.05 - 1.2 K/UL    ABS. EOSINOPHILS 0.0 0.0 - 0.4 K/UL    ABS. BASOPHILS 0.0 0.0 - 0.1 K/UL    DF AUTOMATED           Telemetry:    The patient is: [x] acutely ill Risk of deterioration: [x] moderate    [] critically ill  [] high     [x]See my orders for details    My assessment, plan of care, findings, medications, side effects etc were discussed with:  [] Nurse [] PT/OT    [] Respiratory therapy [x]     [] Family: answered all questions to satisfaction [x] Patient: answered all questions to satisfaction   [] Pharmacist []      [x] Total critical care time exclusive of procedures 30 minutes with complex decision making, coordination of care and counseling patient performed and > 50% time spent in face to face evaluation.       Ken Harris MD  3/10/2019

## 2019-03-10 NOTE — ROUTINE PROCESS
Bedside and Verbal shift change report given to [address] (oncoming nurse) by Jay Reynolds RN (offgoing nurse). Report included the following information SBAR, Kardex, Intake/Output, MAR, Recent Results and Cardiac Rhythm NSR.

## 2019-03-10 NOTE — PROGRESS NOTES
[facility]      CARDIOLOGY PROGRESS NOTE  RECS:  1. Chest pain- likely angina- has abnormal ekg concerning with ischemia with deep T waves in inferior and lateral leads. - her troponin are flat-  Patient is chest pain free. Reports no improvement of her SOB. - continue with heparin drip, added asa and statin- Pulmonary has been consulted. Needs cardiac w/u likely LHC when COPD exacerbation improves   2. Hypertension- stable on Norvasc   3. COPD exacerbation- on admission patient was with respiratory acidosis and hypercapnia along with hypoxia. She was put on BiPAP. currently on O2 nasal canula   4. Obstructive sleep apnea   5. Chronic pain syndrome- medications per medical team   6. Depression- grieving her daughter recent passing        Currently being managed for copd exacerbation- bronchospasm marginally better today- followed by pulmonary. ASSESSMENT:  Hospital Problems  Date Reviewed: 12/19/2017          Codes Class Noted POA    Acute bronchitis with chronic obstructive pulmonary disease (COPD) ([facility] [address] ICD-10-CM: J44.0, J20.9  ICD-9-CM: 491.22  3/9/2019 Unknown        T wave inversion in EKG ICD-10-CM: R94.31  ICD-9-CM: 794.31  3/9/2019 Unknown                SUBJECTIVE:  No CP or SOB    OBJECTIVE:    VS:   Visit Vitals  /72   Pulse 97   Temp 98.2 °F (36.8 °C)   Resp 20   Ht 4' 11\" (1.499 m)   Wt 95.7 kg (211 lb)   SpO2 95%   Breastfeeding? No   BMI 42.62 kg/m²         Intake/Output Summary (Last 24 hours) at 3/10/2019 1230  Last data filed at 3/10/2019 0208  Gross per 24 hour   Intake 2340.1 ml   Output    Net 2340.1 ml     TELE: normal sinus rhythm    General: in no apparent distress  HENT: Normocephalic, atraumatic. Normal external eye.   Neck :  JVD difficult to assess due to obesity  Cardiac:  regular rate and rhythm, S1, S2 normal, no murmur, click, rub or gallop  Lungs: rhonchi b/l lung fields  Abdomen: Soft, nontender, no masses  Extremities:  No edema      Labs: Results:       Chemistry Recent Labs     03/08/19 1855   *      K 4.1   *   CO2 31   BUN 15   CREA 1.10   CA 8.5   AGAP 5   BUCR 14      TP 6.9   ALB 3.7   GLOB 3.2   AGRAT 1.2      CBC w/Diff Recent Labs     03/10/19  0019 03/09/19  0519 03/08/19 1855   WBC 10.9 6.4 7.2   RBC 4.57 4.72 4.86   HGB 13.2 13.6 14.1   HCT 40.4 42.3 43.1    252 246   GRANS 86* 81* 53   LYMPH 12* 19* 39   EOS 0 0 1      Cardiac Enzymes Recent Labs     03/08/19  2252 03/08/19 1855    112   CKND1 3.7 3.8      Coagulation Recent Labs     03/10/19  0910 03/10/19  0019   APTT 60.0* 67.9*       Lipid Panel No results found for: CHOL, CHOLPOCT, CHOLX, CHLST, CHOLV, 125197, HDL, LDL, LDLC, DLDLP, 974084, VLDLC, VLDL, TGLX, TRIGL, TRIGP, TGLPOCT, CHHD, CHHDX   BNP No results for input(s): BNPP in the last 72 hours.    Liver Enzymes Recent Labs     03/08/19 1855   TP 6.9   ALB 3.7      SGOT 14*      Thyroid Studies Lab Results   Component Value Date/Time    TSH 0.07 (L) 04/18/2017 10:44 AM              Chidi Ovalle MD

## 2019-03-11 LAB — APTT PPP: 81.5 SEC (ref 23–36.4)

## 2019-03-11 PROCEDURE — 74011250637 HC RX REV CODE- 250/637: Performed by: INTERNAL MEDICINE

## 2019-03-11 PROCEDURE — 85730 THROMBOPLASTIN TIME PARTIAL: CPT

## 2019-03-11 PROCEDURE — 74011250637 HC RX REV CODE- 250/637: Performed by: HOSPITALIST

## 2019-03-11 PROCEDURE — 74011250636 HC RX REV CODE- 250/636: Performed by: INTERNAL MEDICINE

## 2019-03-11 PROCEDURE — 74011250636 HC RX REV CODE- 250/636: Performed by: NURSE PRACTITIONER

## 2019-03-11 PROCEDURE — 65660000000 HC RM CCU STEPDOWN

## 2019-03-11 PROCEDURE — 74011250637 HC RX REV CODE- 250/637: Performed by: NURSE PRACTITIONER

## 2019-03-11 PROCEDURE — 77030038269 HC DRN EXT URIN PURWCK BARD -A

## 2019-03-11 PROCEDURE — 74011250636 HC RX REV CODE- 250/636: Performed by: HOSPITALIST

## 2019-03-11 PROCEDURE — 74011250637 HC RX REV CODE- 250/637: Performed by: PSYCHIATRY & NEUROLOGY

## 2019-03-11 PROCEDURE — 74011000250 HC RX REV CODE- 250: Performed by: INTERNAL MEDICINE

## 2019-03-11 PROCEDURE — 36415 COLL VENOUS BLD VENIPUNCTURE: CPT

## 2019-03-11 PROCEDURE — 94640 AIRWAY INHALATION TREATMENT: CPT

## 2019-03-11 RX ORDER — HYDRALAZINE HYDROCHLORIDE 20 MG/ML
10 INJECTION INTRAMUSCULAR; INTRAVENOUS
Status: DISCONTINUED | OUTPATIENT
Start: 2019-03-11 | End: 2019-03-14 | Stop reason: HOSPADM

## 2019-03-11 RX ORDER — QUETIAPINE FUMARATE 25 MG/1
25 TABLET, FILM COATED ORAL
Status: DISCONTINUED | OUTPATIENT
Start: 2019-03-11 | End: 2019-03-13

## 2019-03-11 RX ORDER — HYDRALAZINE HYDROCHLORIDE 50 MG/1
50 TABLET, FILM COATED ORAL 3 TIMES DAILY
Status: DISCONTINUED | OUTPATIENT
Start: 2019-03-11 | End: 2019-03-11

## 2019-03-11 RX ORDER — AZITHROMYCIN 250 MG/1
500 TABLET, FILM COATED ORAL EVERY 24 HOURS
Status: DISCONTINUED | OUTPATIENT
Start: 2019-03-12 | End: 2019-03-14

## 2019-03-11 RX ORDER — ALBUTEROL SULFATE 0.83 MG/ML
2.5 SOLUTION RESPIRATORY (INHALATION)
Status: DISCONTINUED | OUTPATIENT
Start: 2019-03-11 | End: 2019-03-14 | Stop reason: HOSPADM

## 2019-03-11 RX ORDER — QUETIAPINE FUMARATE 25 MG/1
50 TABLET, FILM COATED ORAL
Status: DISCONTINUED | OUTPATIENT
Start: 2019-03-11 | End: 2019-03-11

## 2019-03-11 RX ORDER — ESCITALOPRAM OXALATE 10 MG/1
10 TABLET ORAL EVERY EVENING
Status: DISCONTINUED | OUTPATIENT
Start: 2019-03-11 | End: 2019-03-14 | Stop reason: HOSPADM

## 2019-03-11 RX ORDER — METOPROLOL TARTRATE 25 MG/1
12.5 TABLET, FILM COATED ORAL EVERY 12 HOURS
Status: DISCONTINUED | OUTPATIENT
Start: 2019-03-11 | End: 2019-03-14 | Stop reason: HOSPADM

## 2019-03-11 RX ADMIN — ROFLUMILAST 500 MCG: 500 TABLET ORAL at 09:10

## 2019-03-11 RX ADMIN — ESCITALOPRAM OXALATE 10 MG: 10 TABLET ORAL at 17:38

## 2019-03-11 RX ADMIN — METHYLPREDNISOLONE SODIUM SUCCINATE 80 MG: 40 INJECTION, POWDER, FOR SOLUTION INTRAMUSCULAR; INTRAVENOUS at 05:26

## 2019-03-11 RX ADMIN — METHYLPREDNISOLONE SODIUM SUCCINATE 80 MG: 40 INJECTION, POWDER, FOR SOLUTION INTRAMUSCULAR; INTRAVENOUS at 13:19

## 2019-03-11 RX ADMIN — Medication 10 ML: at 13:27

## 2019-03-11 RX ADMIN — OXYCODONE AND ACETAMINOPHEN 2 TABLET: 10; 325 TABLET ORAL at 15:33

## 2019-03-11 RX ADMIN — Medication 10 ML: at 22:07

## 2019-03-11 RX ADMIN — ATORVASTATIN CALCIUM 20 MG: 20 TABLET, FILM COATED ORAL at 22:07

## 2019-03-11 RX ADMIN — METOPROLOL TARTRATE 12.5 MG: 25 TABLET ORAL at 13:19

## 2019-03-11 RX ADMIN — ARFORMOTEROL TARTRATE 15 MCG: 15 SOLUTION RESPIRATORY (INHALATION) at 20:00

## 2019-03-11 RX ADMIN — FAMOTIDINE 20 MG: 20 TABLET ORAL at 09:10

## 2019-03-11 RX ADMIN — OXYCODONE AND ACETAMINOPHEN 2 TABLET: 10; 325 TABLET ORAL at 07:33

## 2019-03-11 RX ADMIN — ALPRAZOLAM 1 MG: 0.5 TABLET ORAL at 17:38

## 2019-03-11 RX ADMIN — Medication 10 ML: at 05:28

## 2019-03-11 RX ADMIN — ALBUTEROL SULFATE 2.5 MG: 2.5 SOLUTION RESPIRATORY (INHALATION) at 09:10

## 2019-03-11 RX ADMIN — HYDRALAZINE HYDROCHLORIDE 10 MG: 20 INJECTION INTRAMUSCULAR; INTRAVENOUS at 10:05

## 2019-03-11 RX ADMIN — CLONIDINE HYDROCHLORIDE 0.1 MG: 0.1 TABLET ORAL at 09:10

## 2019-03-11 RX ADMIN — METOPROLOL TARTRATE 12.5 MG: 25 TABLET ORAL at 20:55

## 2019-03-11 RX ADMIN — BUDESONIDE 500 MCG: 0.5 INHALANT RESPIRATORY (INHALATION) at 20:00

## 2019-03-11 RX ADMIN — TIOTROPIUM BROMIDE 18 MCG: 18 CAPSULE ORAL; RESPIRATORY (INHALATION) at 10:07

## 2019-03-11 RX ADMIN — Medication 81 MG: at 09:10

## 2019-03-11 RX ADMIN — SODIUM CHLORIDE 500 MG: 900 INJECTION, SOLUTION INTRAVENOUS at 03:03

## 2019-03-11 RX ADMIN — MONTELUKAST SODIUM 10 MG: 10 TABLET, COATED ORAL at 22:07

## 2019-03-11 RX ADMIN — QUETIAPINE FUMARATE 25 MG: 25 TABLET ORAL at 20:54

## 2019-03-11 RX ADMIN — METHYLPREDNISOLONE SODIUM SUCCINATE 40 MG: 40 INJECTION, POWDER, FOR SOLUTION INTRAMUSCULAR; INTRAVENOUS at 17:37

## 2019-03-11 RX ADMIN — AMLODIPINE BESYLATE 10 MG: 10 TABLET ORAL at 09:10

## 2019-03-11 RX ADMIN — ALPRAZOLAM 1 MG: 0.5 TABLET ORAL at 09:10

## 2019-03-11 RX ADMIN — HYDRALAZINE HYDROCHLORIDE 50 MG: 50 TABLET, FILM COATED ORAL at 10:05

## 2019-03-11 RX ADMIN — CLONIDINE HYDROCHLORIDE 0.1 MG: 0.1 TABLET ORAL at 17:38

## 2019-03-11 RX ADMIN — ALBUTEROL SULFATE 2.5 MG: 2.5 SOLUTION RESPIRATORY (INHALATION) at 20:00

## 2019-03-11 RX ADMIN — ALBUTEROL SULFATE 2.5 MG: 2.5 SOLUTION RESPIRATORY (INHALATION) at 13:19

## 2019-03-11 NOTE — PROGRESS NOTES
Patient wanted heparin to be disconnected to go to the bathroom, she was informed that it should not be disconnected but it could be unplug. She then stated that she wanted a purewick but she was told that since she is able to walk, the CNA would assist her to the bathroom. Patient became verbally abusive and walked up to the nurses station and was disrespectful to the nurses.  She was then assisted back to her room and given a commode

## 2019-03-11 NOTE — PROGRESS NOTES
NUTRITION    Nursing Referral: [facility]     RECOMMENDATIONS / PLAN:     - Add supplements: Ensure Enlive, once daily. - Continue RD inpatient monitoring and evaluation. NUTRITION INTERVENTIONS & DIAGNOSIS:     [x] Meals/snacks: modify composition    Nutrition Diagnosis: Predicted inadequate energy intake related to decreased appetite as evidenced by pt with poor meal intake since admission. ASSESSMENT:     Pt sleeping during visit. Per RN pt only consumed about 15% of breakfast this am, stating she wasn't hungry, pt reporting to RN that she is hungry for lunch. Fluctuations in weight hx per chart review. Tolerating diet. Average po intake adequate to meet patients estimated nutritional needs:   [] Yes     [] No   [] Unable to determine at this time    Diet: DIET CARDIAC Regular      Food Allergies: NKFA  Current Appetite:   [] Good     [x] Fair per RN    [] Poor     [] Other  Appetite/meal intake prior to admission:   [x] Good per nursing screen     [] Fair     [] Poor     [] Other:  Feeding Limitations:  [] Swallowing difficulty    [] Chewing difficulty    [] Other:  Current Meal Intake: No data found. BM: unknown  Skin Integrity: WDL  Edema:   [x] No     [] Yes   Pertinent Medications: Reviewed: pepcid, steroid,    Recent Labs     03/08/19  1855      K 4.1   *   CO2 31   *   BUN 15   CREA 1.10   CA 8.5   ALB 3.7   SGOT 14*   ALT 19     No intake or output data in the 24 hours ending 03/11/19 1134    Anthropometrics:  Ht Readings from Last 1 Encounters:   03/10/19 4' 11\" (1.499 m)     Last 3 Recorded Weights in this Encounter    03/10/19 0432 03/10/19 1212 03/11/19 0527   Weight: 95.9 kg (211 lb 8 oz) 95.7 kg (211 lb) 96.8 kg (213 lb 8 oz)     Body mass index is 43.12 kg/m². Obese Class III    Weight History: Pt sleeping during initial visit. Per chart review, pt with fluctuations in weight hx with net weight loss x 2 years and net weight gain x 1 year PTA.     Weight Metrics 3/11/2019 1/26/2018 12/21/2017 4/23/2017 4/9/2017 3/10/2017 3/8/2017   Weight 213 lb 8 oz 200 lb 207 lb 241 lb 273 lb 2.4 oz 252 lb 263 lb 12.8 oz   BMI 43.12 kg/m2 40.4 kg/m2 41.81 kg/m2 44.08 kg/m2 49.96 kg/m2 50.9 kg/m2 53.28 kg/m2        Admitting Diagnosis: Acute bronchitis with chronic obstructive pulmonary disease (COPD) (HCC) [J44.0, J20.9]  T wave inversion in EKG [R94.31]  Pertinent PMHx: COPD, CHF, HTN    Education Needs:        [x] None identified  [] Identified - Not appropriate at this time  []  Identified and addressed - refer to education log  Learning Limitations:   [x] None identified  [] Identified    Cultural, Buddhism & ethnic food preferences:  [x] None identified    [] Identified and addressed     ESTIMATED NUTRITION NEEDS:     Calories: 9688-3801 kcal (MSJx1.2-1.3) based on  [x] Actual BW: 97 kg      [] IBW   Protein: 78-97 gm (0.8-1 gm/kg) based on  [x] Actual BW      [] IBW   Fluid: 1 mL/kcal     MONITORING & EVALUATION:     Nutrition Goal(s):   1. Po intake of meals will meet >75% of patient estimated nutritional needs within the next 7 days.   Outcome:  [] Met/Ongoing    []  Not Met    [x] New/Initial Goal     Monitoring:   [x] Food and beverage intake   [x] Diet order   [x] Nutrition-focused physical findings   [x] Treatment/therapy   [] Weight   [] Enteral nutrition intake        Previous Recommendations (for follow-up assessments only):     []   Implemented       []   Not Implemented (RD to address)      [] No Longer Appropriate     [] No Recommendation Made     Discharge Planning: cardiac diet  [x] Participated in care planning, discharge planning, & interdisciplinary rounds as appropriate      Colleen Fenton RD   Pager: [phone]

## 2019-03-11 NOTE — ROUTINE PROCESS
Pain medications given for back  And rib pain. Patient is very anxious and get easily agitated. Patient is requesting her anxiety medication to be increased to a \" green bar. \"  MD paged and informed of the patient's request.  MD stated that he'll be looking/checking on her chart.

## 2019-03-11 NOTE — PROGRESS NOTES
[facility]  Progress Note    Patient: Gurwinder Riley Age: 46 y.o. : 1965 MR#: [id number] SSN: [id number]  Date/Time: 3/11/2019     Subjective:     Review of systems    extremely agitated and anxious     Very upset due to her daughter's death 4- 6 weeks ago     Upper body ache   Sob+  No distress at rest     Assessment/Plan:   1. COPD acute / asthma overlap / Acute hypoxic hypercarbic respiratory failure   -- sob still present   - part of her SOB coming from severe anxiety and agitated state - decrease steroids   - d/w pulmonary   - will consult psych also - patient agreed to see them   - patient denies any suicidal ideation - she wanted       2 severe myalgia   - Adjust the dose of Percocet   - due to pain she is showing more anxiety     3 h/o opiates positive in urine     4 Right thyroid enlargement and multinodular goiter,     5 Mild trop elevation   - s/b cardiology   -  T inversion in lateral and inferior leads are significant   - continue IV heparin   - Trop trended flat   - possible cath - when she is stable from pulmonary        full CODE     Case discussed with:  [x]Patient  [] Family ( In room with patient )    []Nursing  []Case Management  DVT Prophylaxis:  []Lovenox  []Hep SQ  []SCDs  []Coumadin   [x]On Heparin gtt          Objective:   VS:   Visit Vitals  /69 (BP 1 Location: Left arm, BP Patient Position: At rest)   Pulse 93   Temp 97.2 °F (36.2 °C)   Resp 21   Ht 4' 11\" (1.499 m)   Wt 96.8 kg (213 lb 8 oz)   SpO2 97%   Breastfeeding?  No   BMI 43.12 kg/m²      Tmax/24hrs: Temp (24hrs), Av.5 °F (36.4 °C), Min:97.2 °F (36.2 °C), Max:98.2 °F (36.8 °C)  IOBRIEF  No intake or output data in the 24 hours ending 19 1334    General:  Alert, cooperative, no acute distress   Cardiovascular: S1S2 - regular , No Murmur   Pulmonary: Equal expansion , No Use of accessory muscles , No Rales , Bilateral rhonchi ++  GI:  +BS in all four quadrants, soft, non-tender  Extremities:  No edema; 2+ dorsalis pedis pulses bilaterally  Neuro: Alert and oriented X 2.        Medications:   Current Facility-Administered Medications   Medication Dose Route Frequency    albuterol (PROVENTIL VENTOLIN) nebulizer solution 2.5 mg  2.5 mg Nebulization Q6HWA RT    hydrALAZINE (APRESOLINE) 20 mg/mL injection 10 mg  10 mg IntraVENous Q6H PRN    metoprolol tartrate (LOPRESSOR) tablet 12.5 mg  12.5 mg Oral Q12H    [START ON 3/12/2019] azithromycin (ZITHROMAX) tablet 500 mg  500 mg Oral Q24H    oxyCODONE-acetaminophen (PERCOCET 10)  mg per tablet 2 Tab  2 Tab Oral Q8H PRN    cloNIDine HCl (CATAPRES) tablet 0.1 mg  0.1 mg Oral BID    ALPRAZolam (XANAX) tablet 1 mg  1 mg Oral BID    amLODIPine (NORVASC) tablet 10 mg  10 mg Oral DAILY    famotidine (PEPCID) tablet 20 mg  20 mg Oral DAILY    montelukast (SINGULAIR) tablet 10 mg  10 mg Oral QHS    tiotropium (SPIRIVA) inhalation capsule 18 mcg  1 Cap Inhalation DAILY    sodium chloride (NS) flush 5-40 mL  5-40 mL IntraVENous Q8H    sodium chloride (NS) flush 5-40 mL  5-40 mL IntraVENous PRN    acetaminophen (TYLENOL) tablet 650 mg  650 mg Oral Q4H PRN    aspirin chewable tablet 81 mg  81 mg Oral DAILY    atorvastatin (LIPITOR) tablet 20 mg  20 mg Oral QHS    methylPREDNISolone (PF) (Solu-MEDROL) injection 80 mg  80 mg IntraVENous Q6H    budesonide (PULMICORT) 500 mcg/2 ml nebulizer suspension  500 mcg Nebulization BID RT    arformoterol (BROVANA) neb solution 15 mcg  15 mcg Nebulization BID RT    roflumilast (DALIRESP) tablet 500 mcg  500 mcg Oral DAILY       Labs:    Recent Labs     03/10/19  0019 03/09/19  0519 03/08/19  1855   WBC 10.9 6.4 7.2   HGB 13.2 13.6 14.1   HCT 40.4 42.3 43.1    252 246     Recent Labs     03/08/19  1855      K 4.1   *   CO2 31   *   BUN 15   CREA 1.10   CA 8.5   ALB 3.7   SGOT 14*   ALT 19         Signed By: Chicho Gracia MD     March 11, 2019

## 2019-03-11 NOTE — PROGRESS NOTES
Patient stated she wants to run a bath and drown in it and said that she was talking suicidal. RN asked if she wanted to hurt herself or kill herself. Patient said, \"I don't know how I feel. \" RN asked again and patient continued to assert that she didn't know how she felt and would not say if she felt suicidal or not. Dr. Umair Gómez aware. Patient agitated with RN. States RN is changing patient's words and trying to get her sent to a psyche unit. Patient came into samuels and was shouting at staff at nursing station. Patient stated did not want this RN to be her nurse. Will not work with other nurses on unit. This RN remains patient's nurse and is working [facility] to deliver care.

## 2019-03-11 NOTE — PROGRESS NOTES
Pnt resting quietly in bed  Ambulatory verbal alert and oriented  RR even and unlabored  Pnt stable  Will continue to monitor  NADN

## 2019-03-11 NOTE — PROGRESS NOTES
conducted an initial consultation and Spiritual Assessment for Nova Mims, who is a 46 y.o.,female. Patients Primary Language is: [place]. According to the patients EMR Yarsani Affiliation is: [employer] Daug[employer].     The reason the Patient came to the hospital is:   Patient Active Problem List    Diagnosis Date Noted    Acute bronchitis with chronic obstructive pulmonary disease (COPD) ([address] 03/09/2019    T wave inversion in EKG 03/09/2019    Hypertensive heart failure ([address] 12/19/2017    Sleep apnea 12/19/2017    COPD (chronic obstructive pulmonary disease) with chronic bronchitis ([address] 12/19/2017    Acute asthma exacerbation 04/21/2017    Essential hypertension 03/10/2017    Morbid obesity due to excess calories ([address] 03/10/2017    Chest pain on breathing 03/10/2017    COPD with acute exacerbation ([address] 03/02/2017    Acute respiratory failure with hypercapnia (HCC) 02/26/2017    Asthma exacerbation 01/26/2017    Respiratory failure ([address] 01/11/2017    Acute encephalopathy 01/11/2017    Acute exacerbation of chronic obstructive pulmonary disease (COPD) ([address] 08/29/2016    COPD with exacerbation (HCC) 07/11/2016    COPD exacerbation ([address] 03/12/2016    Acute on chronic respiratory failure with hypoxemia (HCC) 03/12/2016    Shortness of breath 03/05/2016    Asthma 03/05/2016    Abnormal CT of the chest 11/20/2015    Asthma exacerbation attacks 10/19/2015    Status asthmaticus with COPD (chronic obstructive pulmonary disease) ([address] 10/19/2015        The  provided the following Interventions:  Initiated a relationship of care and support. Explored issues of marilee, belief, spirituality and Restorationist/ritual needs while hospitalized. Listened empathically. Provided chaplaincy education. Provided information about Spiritual Care Services. Offered prayer and assurance of continued prayers on patient's behalf. Chart reviewed.     The following outcomes where achieved:  Patient shared limited information about both their medical narrative and spiritual journey/beliefs.  confirmed Patient's Scientology Affiliation. Patient processed feeling about current hospitalization. Patient expressed gratitude for 's visit. Assessment:  Patient does not have any Druze/cultural needs that will affect patients preferences in health care. There are no spiritual or Druze issues which require intervention at this time. Plan:  Chaplains will continue to follow and will provide pastoral care on an as needed/requested basis.  recommends bedside caregivers page  on duty if patient shows signs of acute spiritual or emotional distress.     Chaplain Resident [address] [place] [address]   [phone]

## 2019-03-11 NOTE — PROGRESS NOTES
Patient refusing to be swabbed for flu. Droplet precautions stay in place. Will continue to monitor.

## 2019-03-11 NOTE — PROGRESS NOTES
Psychiatry-reviewed chart,met with the nurse and attempted to interview the patient. Twice I went to see her but she was sleeping. I tried to wake her up but she just briefly opened her eyes and then rolled back over The nurse said she had received some xanax today. P;reevaluate her tomorrow.

## 2019-03-11 NOTE — ROUTINE PROCESS
Verbal shift change report given to Luly (oncoming nurse) by Casimir Simmonds (offgoing nurse). Report included the following information SBAR, Kardex and MAR.

## 2019-03-11 NOTE — PROGRESS NOTES
[facility]      CARDIOLOGY PROGRESS NOTE  RECS:      1. Chest pain- likely angina- has abnormal ekg concerning with ischemia with deep T waves in inferior and lateral leads. - her troponin are flat-  Patient is chest pain free. Reports no improvement of her SOB. - off heparin drip. Continue asa and statin-. Needs cardiac w/u likely LHC when COPD exacerbation improves   2. Hypertension-elevated today. continue with  Norvasc. Added low dose bb and monitor for wheezing   3. COPD exacerbation- on admission patient was with respiratory acidosis and hypercapnia along with hypoxia. She was put on BiPAP. currently on O2 nasal canula  - followed by [facility]  4. Obstructive sleep apnea   5. Chronic pain syndrome- medications per medical team   6. Depression- grieving her daughter recent passing      COPD improving. Agree with discontinuing heparin. Cardiac w/u when bronchospasm improves. Echo 3/19  · Left ventricular hyperdynamic systolic function. Estimated left ventricular ejection fraction is >70%. Visually measured ejection fraction. Left ventricular moderate concentric hypertrophy. No regional wall motion abnormality noted. Inconclusive left ventricular diastolic function. · Right ventricular global systolic function is increased (hyperdynamic). · Trace mitral valve regurgitation. · Mild pulmonary hypertension is present.     ASSESSMENT:  Hospital Problems  Date Reviewed: 12/19/2017          Codes Class Noted POA    Acute bronchitis with chronic obstructive pulmonary disease (COPD) ([facility] ICD-10-CM: J44.0, J20.9  ICD-9-CM: 491.22  3/9/2019 Unknown        T wave inversion in EKG ICD-10-CM: R94.31  ICD-9-CM: 794.31  3/9/2019 Unknown                SUBJECTIVE:  No CP or SOB    OBJECTIVE:    VS:   Visit Vitals  BP (!) 182/95 (BP 1 Location: Left arm, BP Patient Position: At rest)   Pulse 71   Temp 97.2 °F (36.2 °C)   Resp 19   Ht 4' 11\" (1.499 m)   Wt 96.8 kg (213 lb 8 oz)   SpO2 98% Breastfeeding? No   BMI 43.12 kg/m²       No intake or output data in the 24 hours ending 03/11/19 0958    TELE: normal sinus rhythm    General: in no apparent distress  HENT: Normocephalic, atraumatic. Normal external eye. Neck :  JVD difficult to assess due to obesity  Cardiac:  regular rate and rhythm, S1, S2 normal, no murmur, click, rub or gallop  Lungs: diminished b/l. Abdomen: Soft, nontender, no masses  Extremities:  No edema      Labs: Results:       Chemistry Recent Labs     03/08/19  1855   *      K 4.1   *   CO2 31   BUN 15   CREA 1.10   CA 8.5   AGAP 5   BUCR 14      TP 6.9   ALB 3.7   GLOB 3.2   AGRAT 1.2      CBC w/Diff Recent Labs     03/10/19  0019 03/09/19  0519 03/08/19  1855   WBC 10.9 6.4 7.2   RBC 4.57 4.72 4.86   HGB 13.2 13.6 14.1   HCT 40.4 42.3 43.1    252 246   GRANS 86* 81* 53   LYMPH 12* 19* 39   EOS 0 0 1      Cardiac Enzymes Recent Labs     03/08/19  2252 03/08/19  1855    112   CKND1 3.7 3.8      Coagulation Recent Labs     03/11/19  0106 03/10/19  0910   APTT 81.5* 60.0*       Lipid Panel No results found for: CHOL, CHOLPOCT, CHOLX, CHLST, CHOLV, 198681, HDL, LDL, LDLC, DLDLP, 157572, VLDLC, VLDL, TGLX, TRIGL, TRIGP, TGLPOCT, CHHD, CHHDX   BNP No results for input(s): BNPP in the last 72 hours. Liver Enzymes Recent Labs     03/08/19  1855   TP 6.9   ALB 3.7      SGOT 14*      Thyroid Studies Lab Results   Component Value Date/Time    TSH 0.07 (L) 04/18/2017 10:44 AM              LAUREN Kaur   Supervised    I have independently evaluated and examined the patient. All relevant labs and testing data's are reviewed. Care plan discussed and updated after review.     Cameron Gutiérrez MD

## 2019-03-11 NOTE — PROGRESS NOTES
Reason for Admission:  Acute bronchitis with chronic obstructive pulmonary disease (COPD) ([facility] [J44.0, J20.9]  T wave inversion in EKG [R94.31]                 RRAT Score:   9           Plan for utilizing home health:    TBD, shelter may not allow visitors                      Likelihood of Readmission:   LOW                         Transition of Care Plan:              Initial assessment completed with patient. Cognitive status of patient: oriented to time, place, person and situation. Face sheet information confirmed:  yes. [facility] The patient designates herself to participate in his/her discharge plan and to receive any needed information. This patient lives in a homeless shelter. Patient is able to navigate steps as needed. Prior to hospitalization, patient was considered to be independent with ADLs/IADLS : yes . Patient has a current ACP document on file: yes  The shelter staff will be available to transport patient home upon discharge. The patient already has Walker, W/C, none reported  (pt states she was supposed get oxygen on last admission but not delivered by first choice) medical equipment available in the home. Patient is not currently active with home health. Patient has not stayed in a skilled nursing facility or rehab. This patient is on dialysis :no      Freedom of choice signed: no. Currently, the discharge plan is shelter (call [phone]  at [phone] when pt is ready and shelter staff will transport). The patient states that she can obtain her medications from the pharmacy, and take her medications as directed.     Patient's current insurance is mediciad       Care Management Interventions  PCP Verified by CM: No(needs PCP)  Mode of Transport at Discharge: Self(pt staying at [facility] and shelter will transport at discharge.)  Current Support Network: Shelter  Confirm Follow Up Transport: Other (see comment)(shelter will help facilitate transport)  Plan discussed with Pt/Family/Caregiver: Yes  Discharge Location  Discharge Placement: [facility], [address]

## 2019-03-11 NOTE — ROUTINE PROCESS
Bedside and Verbal shift change report given to Jeanette RN (oncoming nurse) by Melissa Forrest   RN (offgoing nurse). Report included the following information SBAR, Kardex, Intake/Output, MAR, Recent Results and Cardiac Rhythm NSR.

## 2019-03-11 NOTE — PROGRESS NOTES
[facility]  Pulmonary, Critical Care, and Sleep Medicine    Name: Nova Mims MRN: [id number]   : 1965 Hospital: [facility]   Date: 3/11/2019        Pulmonary Follow-up In-Patient Consult                                              Consult requesting physician: Dr. Nohelia Olivo  Reason for Consult: COPD Exacerbation    IMPRESSION:   · COPD/asthma overlap with very poor control and frequent admissions for exacerbations now with moderate to severe exacerbation with concern for ACS as well. Anxiety also contributing  · MY - non-compliant; unclear the pressure settings  · Obesity  · HTN  · HF  · Tobacco abuse        RECOMMENDATIONS:   · Continue solumedrol at current dose  · Continue pulmicort and brovana nebs  · Continue spiriva   · albuterol for rescue  · Start CPAP at empiric pressure of 12 cm H20  · Continue Daliresp to address frequent AE and obestiy  · Nutrition: per primary  · Replace electrolytes  · HOB >=30 degree, aggressive pulmonary toileting, incentive spirometry, PT/OT eval and treat  · GI Prophylaxis: per primary team    · DVT Prophylaxis: per primary team    · Further recommendations will be based on the patient's response to recommended treatment and results of the investigation ordered. Subjective/History:     19   Upset and angry about previous issues - c/o not feeling weel  C/o SOB despite treatment  Denies cough  Denies chest pain    HPI:  Nova Mims is a 46 y.o. female with PMHx significant for  COPD/asthma overlap, Obesity, MY and CHF who presents with difficulty breathing. Pt reports one week of worsening COPD symptoms including wheezing and trouble breathing. Her trigger is perfumes and smells. She has chest tightness \"from working so hard to breath\". She was discharged from [facility] about 3 weeks ago following a 9 day hospital stay for the same presentation. Notes that she still smokes occasionally.   MY but not using CPAP consistently. Concern for ACS so cardiology consulting and possible plan for [facility] once COPD exacerbation resolving.       No Known Allergies     Past Medical History:   Diagnosis Date    Asthma     CHF (congestive heart failure) (HCC)     Chronic obstructive pulmonary disease (HCC)     Endocrine disease     thyroid issues    Gastrointestinal disorder     \"blockage in my stomach\"    Hypertension       Current Facility-Administered Medications   Medication Dose Route Frequency    albuterol (PROVENTIL VENTOLIN) nebulizer solution 2.5 mg  2.5 mg Nebulization Q6HWA RT    hydrALAZINE (APRESOLINE) 20 mg/mL injection 10 mg  10 mg IntraVENous Q6H PRN    metoprolol tartrate (LOPRESSOR) tablet 12.5 mg  12.5 mg Oral Q12H    [START ON 3/12/2019] azithromycin (ZITHROMAX) tablet 500 mg  500 mg Oral Q24H    oxyCODONE-acetaminophen (PERCOCET 10)  mg per tablet 2 Tab  2 Tab Oral Q8H PRN    cloNIDine HCl (CATAPRES) tablet 0.1 mg  0.1 mg Oral BID    ALPRAZolam (XANAX) tablet 1 mg  1 mg Oral BID    amLODIPine (NORVASC) tablet 10 mg  10 mg Oral DAILY    famotidine (PEPCID) tablet 20 mg  20 mg Oral DAILY    montelukast (SINGULAIR) tablet 10 mg  10 mg Oral QHS    tiotropium (SPIRIVA) inhalation capsule 18 mcg  1 Cap Inhalation DAILY    sodium chloride (NS) flush 5-40 mL  5-40 mL IntraVENous Q8H    sodium chloride (NS) flush 5-40 mL  5-40 mL IntraVENous PRN    acetaminophen (TYLENOL) tablet 650 mg  650 mg Oral Q4H PRN    aspirin chewable tablet 81 mg  81 mg Oral DAILY    atorvastatin (LIPITOR) tablet 20 mg  20 mg Oral QHS    methylPREDNISolone (PF) (Solu-MEDROL) injection 80 mg  80 mg IntraVENous Q6H    budesonide (PULMICORT) 500 mcg/2 ml nebulizer suspension  500 mcg Nebulization BID RT    arformoterol (BROVANA) neb solution 15 mcg  15 mcg Nebulization BID RT    roflumilast (DALIRESP) tablet 500 mcg  500 mcg Oral DAILY         Objective:   Vital Signs:    Visit Vitals  /69 (BP 1 Location: Left arm, BP Patient Position: At rest)   Pulse 93   Temp 97.2 °F (36.2 °C)   Resp 21   Ht 4' 11\" (1.499 m)   Wt 96.8 kg (213 lb 8 oz)   SpO2 97%   Breastfeeding? No   BMI 43.12 kg/m²       O2 Device: Nasal cannula   O2 Flow Rate (L/min): 2 l/min   Temp (24hrs), Av.5 °F (36.4 °C), Min:97.2 °F (36.2 °C), Max:98.2 °F (36.8 °C)       Intake/Output:   Last shift:      No intake/output data recorded. Last 3 shifts:  1901 -  0700  In: 2340.1 [P.O.:1500; I.V.:840.1]  Out: -   No intake or output data in the 24 hours ending 19 1335    Physical Exam:     General:  Alert, Awake, NAD, cooperative, no distress, appears stated age. Head:   Normocephalic, without obvious abnormality, atraumatic. Eye:   Conjunctivae/corneas clear. PERRLA, no scleral icterus, no pallor, no cyanosis  Nose:   Nares normal. Septum midline. Mucosa normal without erythema/exudate. No drainage/discharge. No sinus tenderness. Throat:  Lips, mucosa, and tongue normal. Teeth and gums normal. No tonsillar enlargement, no erythema, no exudates, no oral thrush  Neck:   Supple, symmetric, thyroid: no enlargement/tenderness/nodule, no JVD, no carotid bruit, no lymphadenopathy. Trachea midline  Back & spine: Symmetric, no curvature. Chest wall: No tenderness or deformity. No rash  Lung:   Breath sounds with good movement heard throughout with end expiratory wheezes  Heart:   Regular rate & rhythm. S1 S2 present, no murmur, no gallop, no click, no rub  Abdomen:  Soft, NT, ND, +BS, no masses, no organomegaly  Extremities:  No pedal edema, no cyanosis, no clubbing  Pulses: 2+ and symmetric in DP  Lymphatic:  No cervical, supraclavicular and axillary palpable lymphadenopathy. Musculoskeletal: No joint swelling or tenderness.   Neurologic:  Grossly non focal.        Data:         Chemistry Recent Labs     19  1855   *      K 4.1   *   CO2 31   BUN 15   CREA 1.10   CA 8.5   AGAP 5   BUCR 14      TP 6.9   ALB 3.7   GLOB 3.2 AGRAT 1.2        Lactic Acid Lactic acid   Date Value Ref Range Status   01/13/2017 1.4 0.4 - 2.0 MMOL/L Final     No results for input(s): LAC in the last 72 hours. Liver Enzymes Protein, total   Date Value Ref Range Status   03/08/2019 6.9 6.4 - 8.2 g/dL Final     Albumin   Date Value Ref Range Status   03/08/2019 3.7 3.4 - 5.0 g/dL Final     Globulin   Date Value Ref Range Status   03/08/2019 3.2 2.0 - 4.0 g/dL Final     A-G Ratio   Date Value Ref Range Status   03/08/2019 1.2 0.8 - 1.7   Final     AST (SGOT)   Date Value Ref Range Status   03/08/2019 14 (L) 15 - 37 U/L Final     Alk.  phosphatase   Date Value Ref Range Status   03/08/2019 116 45 - 117 U/L Final     Recent Labs     03/08/19  1855   TP 6.9   ALB 3.7   GLOB 3.2   AGRAT 1.2   SGOT 14*           CBC w/Diff Recent Labs     03/10/19  0019 03/09/19  0519 03/08/19  1855   WBC 10.9 6.4 7.2   RBC 4.57 4.72 4.86   HGB 13.2 13.6 14.1   HCT 40.4 42.3 43.1    252 246   GRANS 86* 81* 53   LYMPH 12* 19* 39   EOS 0 0 1        Cardiac Enzymes No results found for: CPK, CK, CKMMB, CKMB, RCK3, CKMBT, CKNDX, CKND1, JOSE, TROPT, TROIQ, SURY, TROPT, TNIPOC, BNP, BNPP     BNP Lab Results   Component Value Date/Time    BNP 19.9 12/07/2015 05:23 PM    BNP 3.4 11/01/2015 03:20 PM        Coagulation Recent Labs     03/11/19  0106 03/10/19  0910 03/10/19  0019   APTT 81.5* 60.0* 67.9*         Thyroid  Lab Results   Component Value Date/Time    TSH 0.07 (L) 04/18/2017 10:44 AM          Lipid Panel No results found for: CHOL, CHOLPOCT, CHOLX, CHLST, CHOLV, 105274, HDL, LDL, LDLC, DLDLP, 797087, VLDLC, VLDL, TGLX, TRIGL, TRIGP, TGLPOCT, CHHD, CHHDX     ABG Recent Labs     03/08/19  2211   PHI 7.330*   PCO2I 49.4*   PO2I 71*   HCO3I 26.1*   FIO2I 21        Urinalysis Lab Results   Component Value Date/Time    Color YELLOW 04/18/2017 02:30 PM    Appearance CLEAR 04/18/2017 02:30 PM    Specific gravity 1.016 04/18/2017 02:30 PM    pH (UA) 5.5 04/18/2017 02:30 PM Protein NEGATIVE  04/18/2017 02:30 PM    Glucose NEGATIVE  04/18/2017 02:30 PM    Ketone 40 (A) 04/18/2017 02:30 PM    Bilirubin NEGATIVE  04/18/2017 02:30 PM    Urobilinogen 1.0 04/18/2017 02:30 PM    Nitrites NEGATIVE  04/18/2017 02:30 PM    Leukocyte Esterase NEGATIVE  04/18/2017 02:30 PM    Epithelial cells FEW 01/11/2017 06:24 PM    Bacteria FEW (A) 01/11/2017 06:24 PM    WBC NONE 01/11/2017 06:24 PM    RBC NONE 01/11/2017 06:24 PM        Micro  No results for input(s): SDES, CULT in the last 72 hours. No results for input(s): CULT in the last 72 hours. XR (Most Recent). CXR reviewed by me and compared with previous CXR Results from Hospital Encounter encounter on 03/08/19   XR CHEST PORT    Narrative EXAMINATION: Chest single view    INDICATION: COPD exacerbation    COMPARISON: 2/27/2018    FINDINGS: Single frontal view of the chest obtained. Underpenetration limits  evaluation. Borderline prominent cardiac silhouette. Prominent perihilar  interstitium. Minimal aortic arch calcifications. No evidence of pneumothorax. No acute osseous findings. Impression IMPRESSION:    Borderline prominent cardiac silhouette with suspected mild interstitial  infiltrate/edema. CT (Most Recent) Results from Hospital Encounter encounter on 12/19/17   CT CHEST W CONT    Narrative CT CHEST WITH ENHANCEMENT    INDICATION: History of COPD, asthma, congestive heart failure with one week of  constant chest tightness, recent hospitalization for pneumonia completed  antibiotics and steroids without improvement. TECHNIQUE: Axial images obtained from the thoracic inlet to the level of the  diaphragm following uneventful administration of 100 cc Isovue-300 nonionic  intravenous contrast. Coronal and sagittal reformatted images were obtained.     All CT scans at this facility are performed using dose optimization technique as  appropriate to a performed exam, to include automated exposure control,  adjustment of the mA and/or kV according to patient size (including appropriate  matching first site-specific examinations), or use of iterative reconstruction  technique. COMPARISON: CT chest 1/26/2017. CHEST FINDINGS:     Thyroid: Similar right thyroid enlargement with likely multifocal nodules  measuring up to 2 cm. Pericardium/ Heart: Heart size is normal. No pericardial effusion. No definite  calcified coronary arteriosclerosis. Aorta/ Vessels: No aneurysm or dissection. Lymph Nodes: Unremarkable. .    Lungs: Trachea and central bronchial tree are patent. Similar mild subsegmental  atelectasis or scarring in the left lower lobe. No suspicious pulmonary nodule,  consolidation or mass. Pleura: No effusion. Upper Abdomen: Unchanged hepatic hypodensities, the largest is 1.6 cm with cyst  density. Gallbladder is decompressed. 1.4 cm left upper pole renal cyst.    Bones/soft tissues: Unremarkable. Impression IMPRESSION:    No acute findings to explain patient's symptoms. Right thyroid enlargement and multinodular goiter, previously and better  evaluated with ultrasound. EKG No results found for this or any previous visit. ECHO No results found for this or any previous visit. PFT No flowsheet data found.      Other ASA reactivity:   Pre-albumin:   Ionized Calcium:   NH4:   T3, FT4:  Cortisol:  Urine Osm:  Urine Lytes:   HbA1c:      Recent Results (from the past 24 hour(s))   PTT    Collection Time: 03/11/19  1:06 AM   Result Value Ref Range    aPTT 81.5 (H) 23.0 - 36.4 SEC         High complexity decision making was performed during the evaluation of this patient at high risk for decompensation with multiple organ involvement     Above mentioned total time spent on reviewing the case/medical record/data/notes/EMR/patient examination/documentation/coordinating care with nurse/consultants, exclusive of procedures with complex decision making performed and > 50% time spent in face to face evaluation.     Eve Hernandez MD  3/11/2019

## 2019-03-12 LAB
APTT PPP: 23 SEC (ref 23–36.4)
T4 FREE SERPL-MCNC: 0.8 NG/DL (ref 0.7–1.5)
TSH SERPL DL<=0.05 MIU/L-ACNC: 0.02 UIU/ML (ref 0.36–3.74)

## 2019-03-12 PROCEDURE — 74011250637 HC RX REV CODE- 250/637: Performed by: INTERNAL MEDICINE

## 2019-03-12 PROCEDURE — 85730 THROMBOPLASTIN TIME PARTIAL: CPT

## 2019-03-12 PROCEDURE — 65660000000 HC RM CCU STEPDOWN

## 2019-03-12 PROCEDURE — 94640 AIRWAY INHALATION TREATMENT: CPT

## 2019-03-12 PROCEDURE — 84443 ASSAY THYROID STIM HORMONE: CPT

## 2019-03-12 PROCEDURE — 94761 N-INVAS EAR/PLS OXIMETRY MLT: CPT

## 2019-03-12 PROCEDURE — 74011250636 HC RX REV CODE- 250/636: Performed by: INTERNAL MEDICINE

## 2019-03-12 PROCEDURE — 74011250637 HC RX REV CODE- 250/637: Performed by: NURSE PRACTITIONER

## 2019-03-12 PROCEDURE — 84439 ASSAY OF FREE THYROXINE: CPT

## 2019-03-12 PROCEDURE — 74011250637 HC RX REV CODE- 250/637: Performed by: HOSPITALIST

## 2019-03-12 PROCEDURE — 74011000250 HC RX REV CODE- 250: Performed by: INTERNAL MEDICINE

## 2019-03-12 PROCEDURE — 36415 COLL VENOUS BLD VENIPUNCTURE: CPT

## 2019-03-12 PROCEDURE — 74011250637 HC RX REV CODE- 250/637: Performed by: PSYCHIATRY & NEUROLOGY

## 2019-03-12 RX ADMIN — ATORVASTATIN CALCIUM 20 MG: 20 TABLET, FILM COATED ORAL at 22:41

## 2019-03-12 RX ADMIN — METOPROLOL TARTRATE 12.5 MG: 25 TABLET ORAL at 08:52

## 2019-03-12 RX ADMIN — ROFLUMILAST 500 MCG: 500 TABLET ORAL at 08:52

## 2019-03-12 RX ADMIN — ARFORMOTEROL TARTRATE 15 MCG: 15 SOLUTION RESPIRATORY (INHALATION) at 07:47

## 2019-03-12 RX ADMIN — OXYCODONE AND ACETAMINOPHEN 2 TABLET: 10; 325 TABLET ORAL at 08:54

## 2019-03-12 RX ADMIN — TIOTROPIUM BROMIDE 18 MCG: 18 CAPSULE ORAL; RESPIRATORY (INHALATION) at 07:47

## 2019-03-12 RX ADMIN — ESCITALOPRAM OXALATE 10 MG: 10 TABLET ORAL at 17:14

## 2019-03-12 RX ADMIN — OXYCODONE AND ACETAMINOPHEN 2 TABLET: 10; 325 TABLET ORAL at 01:40

## 2019-03-12 RX ADMIN — AMLODIPINE BESYLATE 10 MG: 10 TABLET ORAL at 08:53

## 2019-03-12 RX ADMIN — MONTELUKAST SODIUM 10 MG: 10 TABLET, COATED ORAL at 22:41

## 2019-03-12 RX ADMIN — ALPRAZOLAM 1 MG: 0.5 TABLET ORAL at 17:14

## 2019-03-12 RX ADMIN — FAMOTIDINE 20 MG: 20 TABLET ORAL at 08:54

## 2019-03-12 RX ADMIN — Medication 10 ML: at 13:57

## 2019-03-12 RX ADMIN — METHYLPREDNISOLONE SODIUM SUCCINATE 40 MG: 40 INJECTION, POWDER, FOR SOLUTION INTRAMUSCULAR; INTRAVENOUS at 13:57

## 2019-03-12 RX ADMIN — METHYLPREDNISOLONE SODIUM SUCCINATE 40 MG: 40 INJECTION, POWDER, FOR SOLUTION INTRAMUSCULAR; INTRAVENOUS at 17:14

## 2019-03-12 RX ADMIN — ALBUTEROL SULFATE 2.5 MG: 2.5 SOLUTION RESPIRATORY (INHALATION) at 20:33

## 2019-03-12 RX ADMIN — Medication 10 ML: at 22:46

## 2019-03-12 RX ADMIN — METHYLPREDNISOLONE SODIUM SUCCINATE 40 MG: 40 INJECTION, POWDER, FOR SOLUTION INTRAMUSCULAR; INTRAVENOUS at 01:34

## 2019-03-12 RX ADMIN — OXYCODONE AND ACETAMINOPHEN 2 TABLET: 10; 325 TABLET ORAL at 17:14

## 2019-03-12 RX ADMIN — BUDESONIDE 500 MCG: 0.5 INHALANT RESPIRATORY (INHALATION) at 20:33

## 2019-03-12 RX ADMIN — AZITHROMYCIN 500 MG: 250 TABLET, FILM COATED ORAL at 07:19

## 2019-03-12 RX ADMIN — Medication 81 MG: at 08:54

## 2019-03-12 RX ADMIN — METOPROLOL TARTRATE 12.5 MG: 25 TABLET ORAL at 22:42

## 2019-03-12 RX ADMIN — CLONIDINE HYDROCHLORIDE 0.1 MG: 0.1 TABLET ORAL at 17:14

## 2019-03-12 RX ADMIN — QUETIAPINE FUMARATE 25 MG: 25 TABLET ORAL at 22:42

## 2019-03-12 RX ADMIN — ALBUTEROL SULFATE 2.5 MG: 2.5 SOLUTION RESPIRATORY (INHALATION) at 07:47

## 2019-03-12 RX ADMIN — BUDESONIDE 500 MCG: 0.5 INHALANT RESPIRATORY (INHALATION) at 07:47

## 2019-03-12 RX ADMIN — METHYLPREDNISOLONE SODIUM SUCCINATE 40 MG: 40 INJECTION, POWDER, FOR SOLUTION INTRAMUSCULAR; INTRAVENOUS at 07:19

## 2019-03-12 RX ADMIN — CLONIDINE HYDROCHLORIDE 0.1 MG: 0.1 TABLET ORAL at 08:54

## 2019-03-12 RX ADMIN — ARFORMOTEROL TARTRATE 15 MCG: 15 SOLUTION RESPIRATORY (INHALATION) at 20:33

## 2019-03-12 RX ADMIN — ALPRAZOLAM 1 MG: 0.5 TABLET ORAL at 08:54

## 2019-03-12 RX ADMIN — Medication 10 ML: at 07:22

## 2019-03-12 NOTE — ROUTINE PROCESS
Patient found sleeping  across the bed .woke her up and asked to lay down properly. Patient get upset. Patient get up and sit at the edge of the bed. When applying the leads of the heart monitor, patient noted to be falling backward and when preventing her to fall by  Holding her back, the more the patient get upset.

## 2019-03-12 NOTE — ROUTINE PROCESS
Bedside and Verbal shift change report given to [address] [place] [address] (oncoming nurse) by Theodoro Mohs RN (offgoing nurse). Report included the following information SBAR, Kardex, Intake/Output, Recent Results and Med Rec Status. Patient alert and resting quietly in bed. No complaints of pain at this time.

## 2019-03-12 NOTE — ROUTINE PROCESS
Bedside and Verbal shift change report given to Marie (oncoming nurse) by Lisa Ruiz (offgoing nurse). Report included the following information SBAR and Kardex.

## 2019-03-12 NOTE — ROUTINE PROCESS
Bedside and verbal report received from [facility]. Report included SBAR, LABS, VS and summary of care. Patient in bed talking on phone. Will continue to monitor.

## 2019-03-12 NOTE — PROGRESS NOTES
[facility]      CARDIOLOGY PROGRESS NOTE  RECS:      1. Chest pain- likely angina- has abnormal ekg concerning with ischemia with deep T waves in inferior and lateral leads. - her troponin are flat-  Patient is chest pain free. Continue asa and statin. Feels that her shortness of breath is almost back to baseline. Will recommend for a stress test for tomorrow. 2. Hypertension-elevated today. continue with  Norvasc. Added low dose bb and monitor for wheezing   3. COPD exacerbation- on admission patient was with respiratory acidosis and hypercapnia along with hypoxia. She was put on BiPAP. currently on O2 nasal canula  - followed by [facility]  4. Obstructive sleep apnea   5. Chronic pain syndrome- medications per medical team   6. Depression- grieving her daughter recent passing      COPD improving. Stress test tomorrow. Echo 3/19  · Left ventricular hyperdynamic systolic function. Estimated left ventricular ejection fraction is >70%. Visually measured ejection fraction. Left ventricular moderate concentric hypertrophy. No regional wall motion abnormality noted. Inconclusive left ventricular diastolic function. · Right ventricular global systolic function is increased (hyperdynamic). · Trace mitral valve regurgitation. · Mild pulmonary hypertension is present. ASSESSMENT:  Hospital Problems  Date Reviewed: 12/19/2017          Codes Class Noted POA    Acute bronchitis with chronic obstructive pulmonary disease (COPD) ([facility] [address] ICD-10-CM: J44.0, J20.9  ICD-9-CM: 491.22  3/9/2019 Unknown        T wave inversion in EKG ICD-10-CM: R94.31  ICD-9-CM: 794.31  3/9/2019 Unknown                SUBJECTIVE:  No CP or SOB    OBJECTIVE:    VS:   Visit Vitals  /80 (BP 1 Location: Right arm, BP Patient Position: At rest)   Pulse 72   Temp 98.1 °F (36.7 °C)   Resp 20   Ht 4' 11\" (1.499 m)   Wt 96.6 kg (213 lb)   SpO2 97%   Breastfeeding?  No   BMI 43.02 kg/m²       No intake or output data in the 24 hours ending 03/12/19 1050    TELE: normal sinus rhythm    General: in no apparent distress  HENT: Normocephalic, atraumatic. Normal external eye. Neck :  JVD difficult to assess due to obesity  Cardiac:  regular rate and rhythm, S1, S2 normal, no murmur, click, rub or gallop  Lungs: diminished b/l. Abdomen: Soft, nontender, no masses  Extremities:  No edema      Labs: Results:       Chemistry No results for input(s): GLU, NA, K, CL, CO2, BUN, CREA, CA, AGAP, BUCR, TBIL, GPT, AP, TP, ALB, GLOB, AGRAT in the last 72 hours. CBC w/Diff Recent Labs     03/10/19  0019   WBC 10.9   RBC 4.57   HGB 13.2   HCT 40.4      GRANS 86*   LYMPH 12*   EOS 0      Cardiac Enzymes No results for input(s): CPK, CKND1, JOSE in the last 72 hours. No lab exists for component: CKRMB, TROIP   Coagulation Recent Labs     03/12/19  0328 03/11/19  0106   APTT 23.0 81.5*       Lipid Panel No results found for: CHOL, CHOLPOCT, CHOLX, CHLST, CHOLV, 484573, HDL, LDL, LDLC, DLDLP, 087045, VLDLC, VLDL, TGLX, TRIGL, TRIGP, TGLPOCT, CHHD, CHHDX   BNP No results for input(s): BNPP in the last 72 hours. Liver Enzymes No results for input(s): TP, ALB, TBIL, AP, SGOT, GPT in the last 72 hours.     No lab exists for component: DBIL   Thyroid Studies Lab Results   Component Value Date/Time    TSH 0.02 (L) 03/12/2019 03:28 AM              LUCILA Frost

## 2019-03-12 NOTE — PROGRESS NOTES
Called to [facility] - patient will need out patient psych follow up - CC will arrange
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
## 2019-03-12 NOTE — PROGRESS NOTES
[facility]  Progress Note    Patient: Maddie Diaz Age: 46 y.o. : 1965 MR#: [id number] SSN: [id number]  Date/Time: 3/12/2019     Subjective:     Review of systems    To day she is quite     No CP   No SOB at rest     No NVD     Agitation is less     Assessment/Plan:   1. COPD acute / asthma overlap / Acute hypoxic hypercarbic respiratory failure   - now on decrease in steroids   - continue BD   - now respiratory wise she is at her baseline - her SOB less but ET less and limited by SOB         2 severe myalgia   -  Percocet   - due to pain she is showing more anxiety     3 h/o opiates positive in urine     4 Right thyroid enlargement and multinodular goiter,     5 Mild trop elevation   -- plan for NST in AM and if abnormal then CATH   - s/b cardiology   -  T inversion in lateral and inferior leads are significant   - continue IV heparin   - Trop trended flat   - Low dose BB added by cardiology - if patient develops bronchospasm discontinue / if patient has no evidence of CAD discontinue BB     6 patient had sudden change in behavior yesterday   - very anxious and agitated and upset   - likely steroid induced   - improved to day   - She is on lexapro + xanax + Seroquel   - monitor QTc periodically        full CODE     Case discussed with:  [x]Patient  [] Family ( In room with patient )    []Nursing  []Case Management  DVT Prophylaxis:  []Lovenox  []Hep SQ  []SCDs  []Coumadin   [x]On Heparin gtt          Objective:   VS:   Visit Vitals  /74 (BP 1 Location: Right arm, BP Patient Position: At rest)   Pulse 73   Temp 98.3 °F (36.8 °C)   Resp 18   Ht 4' 11\" (1.499 m)   Wt 96.6 kg (213 lb)   SpO2 97%   Breastfeeding?  No   BMI 43.02 kg/m²      Tmax/24hrs: Temp (24hrs), Av.8 °F (36.6 °C), Min:97.3 °F (36.3 °C), Max:98.3 °F (36.8 °C)  IOBRIEF  No intake or output data in the 24 hours ending 19 1351    General:  Alert, cooperative, no acute distress Cardiovascular: S1S2 - regular , No Murmur   Pulmonary: Equal expansion , No Use of accessory muscles , No Rales , Bilateral rhonchi ++  GI:  +BS in all four quadrants, soft, non-tender  Extremities:  No edema; 2+ dorsalis pedis pulses bilaterally  Neuro: Alert and oriented X 2.        Medications:   Current Facility-Administered Medications   Medication Dose Route Frequency    albuterol (PROVENTIL VENTOLIN) nebulizer solution 2.5 mg  2.5 mg Nebulization Q6HWA RT    hydrALAZINE (APRESOLINE) 20 mg/mL injection 10 mg  10 mg IntraVENous Q6H PRN    metoprolol tartrate (LOPRESSOR) tablet 12.5 mg  12.5 mg Oral Q12H    azithromycin (ZITHROMAX) tablet 500 mg  500 mg Oral Q24H    methylPREDNISolone (PF) (SOLU-MEDROL) injection 40 mg  40 mg IntraVENous Q6H    escitalopram oxalate (LEXAPRO) tablet 10 mg  10 mg Oral QPM    QUEtiapine (SEROquel) tablet 25 mg  25 mg Oral QHS    oxyCODONE-acetaminophen (PERCOCET 10)  mg per tablet 2 Tab  2 Tab Oral Q8H PRN    cloNIDine HCl (CATAPRES) tablet 0.1 mg  0.1 mg Oral BID    ALPRAZolam (XANAX) tablet 1 mg  1 mg Oral BID    amLODIPine (NORVASC) tablet 10 mg  10 mg Oral DAILY    famotidine (PEPCID) tablet 20 mg  20 mg Oral DAILY    montelukast (SINGULAIR) tablet 10 mg  10 mg Oral QHS    tiotropium (SPIRIVA) inhalation capsule 18 mcg  1 Cap Inhalation DAILY    sodium chloride (NS) flush 5-40 mL  5-40 mL IntraVENous Q8H    sodium chloride (NS) flush 5-40 mL  5-40 mL IntraVENous PRN    acetaminophen (TYLENOL) tablet 650 mg  650 mg Oral Q4H PRN    aspirin chewable tablet 81 mg  81 mg Oral DAILY    atorvastatin (LIPITOR) tablet 20 mg  20 mg Oral QHS    budesonide (PULMICORT) 500 mcg/2 ml nebulizer suspension  500 mcg Nebulization BID RT    arformoterol (BROVANA) neb solution 15 mcg  15 mcg Nebulization BID RT    roflumilast (DALIRESP) tablet 500 mcg  500 mcg Oral DAILY       Labs:    Recent Labs     03/10/19  0019   WBC 10.9   HGB 13.2   HCT 40.4        No results for input(s): NA, K, CL, CO2, GLU, BUN, CREA, CA, MG, PHOS, ALB, TBIL, SGOT, ALT, INR in the last 72 hours.     No lab exists for component: Roni Germain      Signed By: Casey Bryson MD     March 12, 2019

## 2019-03-13 ENCOUNTER — APPOINTMENT (OUTPATIENT)
Dept: NON INVASIVE DIAGNOSTICS | Age: 54
DRG: 140 | End: 2019-03-13
Attending: INTERNAL MEDICINE
Payer: MEDICAID

## 2019-03-13 LAB
STRESS BASELINE HR: 65 BPM
STRESS BASELINE SYS BP: NORMAL MMHG
STRESS ESTIMATED WORKLOAD: 1 METS
STRESS EXERCISE DUR MIN: NORMAL
STRESS PEAK DIAS BP: 81 MMHG
STRESS PEAK SYS BP: 158 MMHG
STRESS PERCENT HR ACHIEVED: 74 %
STRESS POST PEAK HR: 104 BPM
STRESS RATE PRESSURE PRODUCT: NORMAL BPM*MMHG
STRESS ST DEPRESSION: 0 MM
STRESS ST ELEVATION: 0 MM
STRESS TARGET HR: 141 BPM

## 2019-03-13 PROCEDURE — 77030038269 HC DRN EXT URIN PURWCK BARD -A

## 2019-03-13 PROCEDURE — A9500 TC99M SESTAMIBI: HCPCS

## 2019-03-13 PROCEDURE — 74011250637 HC RX REV CODE- 250/637: Performed by: HOSPITALIST

## 2019-03-13 PROCEDURE — 65660000000 HC RM CCU STEPDOWN

## 2019-03-13 PROCEDURE — 74011250637 HC RX REV CODE- 250/637: Performed by: INTERNAL MEDICINE

## 2019-03-13 PROCEDURE — 74011250637 HC RX REV CODE- 250/637: Performed by: PSYCHIATRY & NEUROLOGY

## 2019-03-13 PROCEDURE — 74011250637 HC RX REV CODE- 250/637: Performed by: NURSE PRACTITIONER

## 2019-03-13 PROCEDURE — 74011250636 HC RX REV CODE- 250/636: Performed by: INTERNAL MEDICINE

## 2019-03-13 PROCEDURE — 74011000250 HC RX REV CODE- 250: Performed by: INTERNAL MEDICINE

## 2019-03-13 PROCEDURE — 94640 AIRWAY INHALATION TREATMENT: CPT

## 2019-03-13 PROCEDURE — 74011250636 HC RX REV CODE- 250/636: Performed by: EMERGENCY MEDICINE

## 2019-03-13 RX ORDER — ENOXAPARIN SODIUM 100 MG/ML
40 INJECTION SUBCUTANEOUS EVERY 24 HOURS
Status: DISCONTINUED | OUTPATIENT
Start: 2019-03-13 | End: 2019-03-14 | Stop reason: HOSPADM

## 2019-03-13 RX ORDER — FACIAL-BODY WIPES
10 EACH TOPICAL DAILY PRN
Status: DISCONTINUED | OUTPATIENT
Start: 2019-03-13 | End: 2019-03-14 | Stop reason: HOSPADM

## 2019-03-13 RX ORDER — DOCUSATE SODIUM 100 MG/1
100 CAPSULE, LIQUID FILLED ORAL 2 TIMES DAILY
Status: DISCONTINUED | OUTPATIENT
Start: 2019-03-13 | End: 2019-03-14 | Stop reason: HOSPADM

## 2019-03-13 RX ORDER — QUETIAPINE FUMARATE 25 MG/1
50 TABLET, FILM COATED ORAL
Status: DISCONTINUED | OUTPATIENT
Start: 2019-03-13 | End: 2019-03-14 | Stop reason: HOSPADM

## 2019-03-13 RX ORDER — CLONIDINE HYDROCHLORIDE 0.1 MG/1
0.2 TABLET ORAL 2 TIMES DAILY
Status: DISCONTINUED | OUTPATIENT
Start: 2019-03-13 | End: 2019-03-14 | Stop reason: HOSPADM

## 2019-03-13 RX ORDER — SODIUM CHLORIDE 9 MG/ML
250 INJECTION, SOLUTION INTRAVENOUS ONCE
Status: COMPLETED | OUTPATIENT
Start: 2019-03-13 | End: 2019-03-13

## 2019-03-13 RX ADMIN — SODIUM CHLORIDE 250 ML: 900 INJECTION, SOLUTION INTRAVENOUS at 09:00

## 2019-03-13 RX ADMIN — METHYLPREDNISOLONE SODIUM SUCCINATE 20 MG: 40 INJECTION, POWDER, FOR SOLUTION INTRAMUSCULAR; INTRAVENOUS at 23:45

## 2019-03-13 RX ADMIN — ALPRAZOLAM 1 MG: 0.5 TABLET ORAL at 17:08

## 2019-03-13 RX ADMIN — MONTELUKAST SODIUM 10 MG: 10 TABLET, COATED ORAL at 21:13

## 2019-03-13 RX ADMIN — ENOXAPARIN SODIUM 40 MG: 40 INJECTION SUBCUTANEOUS at 17:08

## 2019-03-13 RX ADMIN — ARFORMOTEROL TARTRATE 15 MCG: 15 SOLUTION RESPIRATORY (INHALATION) at 13:47

## 2019-03-13 RX ADMIN — OXYCODONE AND ACETAMINOPHEN 2 TABLET: 10; 325 TABLET ORAL at 01:07

## 2019-03-13 RX ADMIN — ALBUTEROL SULFATE 2.5 MG: 2.5 SOLUTION RESPIRATORY (INHALATION) at 20:28

## 2019-03-13 RX ADMIN — ROFLUMILAST 500 MCG: 500 TABLET ORAL at 09:39

## 2019-03-13 RX ADMIN — ALBUTEROL SULFATE 2.5 MG: 2.5 SOLUTION RESPIRATORY (INHALATION) at 13:47

## 2019-03-13 RX ADMIN — ATORVASTATIN CALCIUM 20 MG: 20 TABLET, FILM COATED ORAL at 21:12

## 2019-03-13 RX ADMIN — FAMOTIDINE 20 MG: 20 TABLET ORAL at 09:39

## 2019-03-13 RX ADMIN — Medication 10 ML: at 17:09

## 2019-03-13 RX ADMIN — ESCITALOPRAM OXALATE 10 MG: 10 TABLET ORAL at 17:08

## 2019-03-13 RX ADMIN — BUDESONIDE 500 MCG: 0.5 INHALANT RESPIRATORY (INHALATION) at 20:28

## 2019-03-13 RX ADMIN — METOPROLOL TARTRATE: 25 TABLET ORAL at 09:39

## 2019-03-13 RX ADMIN — ARFORMOTEROL TARTRATE 15 MCG: 15 SOLUTION RESPIRATORY (INHALATION) at 20:28

## 2019-03-13 RX ADMIN — CLONIDINE HYDROCHLORIDE 0.1 MG: 0.1 TABLET ORAL at 09:39

## 2019-03-13 RX ADMIN — AMLODIPINE BESYLATE 10 MG: 10 TABLET ORAL at 09:39

## 2019-03-13 RX ADMIN — Medication 81 MG: at 09:39

## 2019-03-13 RX ADMIN — REGADENOSON 0.4 MG: 0.08 INJECTION, SOLUTION INTRAVENOUS at 09:00

## 2019-03-13 RX ADMIN — OXYCODONE AND ACETAMINOPHEN 2 TABLET: 10; 325 TABLET ORAL at 09:38

## 2019-03-13 RX ADMIN — METHYLPREDNISOLONE SODIUM SUCCINATE 40 MG: 40 INJECTION, POWDER, FOR SOLUTION INTRAMUSCULAR; INTRAVENOUS at 01:08

## 2019-03-13 RX ADMIN — QUETIAPINE FUMARATE 50 MG: 25 TABLET ORAL at 21:12

## 2019-03-13 RX ADMIN — AZITHROMYCIN 500 MG: 250 TABLET, FILM COATED ORAL at 09:38

## 2019-03-13 RX ADMIN — METHYLPREDNISOLONE SODIUM SUCCINATE 40 MG: 40 INJECTION, POWDER, FOR SOLUTION INTRAMUSCULAR; INTRAVENOUS at 07:39

## 2019-03-13 RX ADMIN — OXYCODONE AND ACETAMINOPHEN 2 TABLET: 10; 325 TABLET ORAL at 17:08

## 2019-03-13 RX ADMIN — Medication 10 ML: at 21:12

## 2019-03-13 RX ADMIN — METOPROLOL TARTRATE 12.5 MG: 25 TABLET ORAL at 21:12

## 2019-03-13 RX ADMIN — BUDESONIDE 500 MCG: 0.5 INHALANT RESPIRATORY (INHALATION) at 13:47

## 2019-03-13 RX ADMIN — METHYLPREDNISOLONE SODIUM SUCCINATE 40 MG: 40 INJECTION, POWDER, FOR SOLUTION INTRAMUSCULAR; INTRAVENOUS at 17:08

## 2019-03-13 RX ADMIN — ALPRAZOLAM 1 MG: 0.5 TABLET ORAL at 09:39

## 2019-03-13 RX ADMIN — CLONIDINE HYDROCHLORIDE 0.2 MG: 0.1 TABLET ORAL at 17:08

## 2019-03-13 NOTE — PROGRESS NOTES
[facility]      CARDIOLOGY PROGRESS NOTE  RECS:      1. Chest pain- likely angina- has abnormal ekg concerning with ischemia with deep T waves in inferior and lateral leads. - her troponin are flat-  Patient is chest pain free. Continue asa and statin. Feels that her shortness of breath is almost back to baseline. Follow  stress test today top assess any CAD  2. Hypertension-elevated today. continue with  Norvasc. Continue  low dose bb. Increase clonidine  3. COPD exacerbation- on admission patient was with respiratory acidosis and hypercapnia along with hypoxia. She was put on BiPAP. currently on O2 nasal canula  - followed by [facility]  4. Obstructive sleep apnea   5. Chronic pain syndrome- medications per medical team   6. Depression- grieving her daughter recent passing      Stress test without any significant ischemia. Would recommend to control the blood pressure better and increase clonidine. Discharge planning per hospitalist.  Discussed with Dr. Talat Gonzalez.    Echo 3/19  · Left ventricular hyperdynamic systolic function. Estimated left ventricular ejection fraction is >70%. Visually measured ejection fraction. Left ventricular moderate concentric hypertrophy. No regional wall motion abnormality noted. Inconclusive left ventricular diastolic function. · Right ventricular global systolic function is increased (hyperdynamic). · Trace mitral valve regurgitation. · Mild pulmonary hypertension is present.     ASSESSMENT:  Hospital Problems  Date Reviewed: 12/19/2017          Codes Class Noted POA    Acute bronchitis with chronic obstructive pulmonary disease (COPD) ([facility] [address] ICD-10-CM: J44.0, J20.9  ICD-9-CM: 491.22  3/9/2019 Unknown        T wave inversion in EKG ICD-10-CM: R94.31  ICD-9-CM: 794.31  3/9/2019 Unknown                SUBJECTIVE:  No CP  Still with SOB    OBJECTIVE:    VS:   Visit Vitals  /81   Pulse 66   Temp 98 °F (36.7 °C)   Resp 18   Ht 4' 11\" (1.499 m)   Wt 97.5 kg (215 lb)   SpO2 97%   Breastfeeding? No   BMI 43.42 kg/m²       No intake or output data in the 24 hours ending 03/13/19 0952    TELE: normal sinus rhythm    General: in no apparent distress  HENT: Normocephalic, atraumatic. Normal external eye. Neck :  JVD difficult to assess due to obesity  Cardiac:  regular rate and rhythm, S1, S2 normal, no murmur, click, rub or gallop  Lungs: diminished b/l. Abdomen: Soft, nontender, no masses  Extremities:  No edema      Labs: Results:       Chemistry No results for input(s): GLU, NA, K, CL, CO2, BUN, CREA, CA, AGAP, BUCR, TBIL, GPT, AP, TP, ALB, GLOB, AGRAT in the last 72 hours. CBC w/Diff No results for input(s): WBC, RBC, HGB, HCT, PLT, GRANS, LYMPH, EOS, HGBEXT, HCTEXT, PLTEXT, HGBEXT, HCTEXT, PLTEXT in the last 72 hours. Cardiac Enzymes No results for input(s): CPK, CKND1, JOSE in the last 72 hours. No lab exists for component: CKRMB, TROIP   Coagulation Recent Labs     03/12/19  0328 03/11/19  0106   APTT 23.0 81.5*       Lipid Panel No results found for: CHOL, CHOLPOCT, CHOLX, CHLST, CHOLV, 855397, HDL, LDL, LDLC, DLDLP, 388622, VLDLC, VLDL, TGLX, TRIGL, TRIGP, TGLPOCT, CHHD, CHHDX   BNP No results for input(s): BNPP in the last 72 hours. Liver Enzymes No results for input(s): TP, ALB, TBIL, AP, SGOT, GPT in the last 72 hours. No lab exists for component: DBIL   Thyroid Studies Lab Results   Component Value Date/Time    TSH 0.02 (L) 03/12/2019 03:28 AM              LAUREN Kaur     I have independently evaluated and examined the patient. All relevant labs and testing data's are reviewed. Care plan discussed and updated after review.   David Clemente MD

## 2019-03-13 NOTE — PROGRESS NOTES
[facility]  Pulmonary, Critical Care, and Sleep Medicine    Name: Ulysses Kale MRN: [id number]   : 1965 Hospital: [facility]   Date: 3/13/2019        Pulmonary Follow-up In-Patient Consult                                              Consult requesting physician: Dr. Wil Merrill  Reason for Consult: COPD Exacerbation    IMPRESSION:   · COPD/asthma overlap with very poor control and frequent admissions for exacerbations now with moderate to severe exacerbation with concern for ACS as well. Anxiety also contributing  · MY - non-compliant; unclear the pressure settings  · Obesity  · HTN  · Chest pain- likely angina- has abnormal ekg concerning with ischemia with deep T waves in inferior and lateral leads.  - her troponin are flat-  Patient is chest pain free. · Tobacco abuse        RECOMMENDATIONS:   · We will start tapering steroids  · Continue pulmicort and brovana nebs  · Continue spiriva   · albuterol for rescue  · Start CPAP at empiric pressure of 12 cm H20  · Continue Daliresp to address frequent AE and obestiy  · Nutrition: per primary  · Replace electrolytes  · Undergoing stress testing-Continue asa and statin. Feels that her shortness of breath is almost back to baseline. Follow stress test today top assess any CAD  · HOB >=30 degree, aggressive pulmonary toileting, incentive spirometry, PT/OT eval and treat  · GI Prophylaxis: per primary team    · DVT Prophylaxis: per primary team    · Further recommendations will be based on the patient's response to recommended treatment and results of the investigation ordered. Subjective/History:     19     Feels improved with breathing  Denies cough  Denies chest pain today  Stress test in progress    HPI:  Ulysses Kale is a 46 y.o. female with PMHx significant for  COPD/asthma overlap, Obesity, MY and CHF who presents with difficulty breathing.  Pt reports one week of worsening COPD symptoms including wheezing and trouble breathing. Her trigger is perfumes and smells. She has chest tightness \"from working so hard to breath\". She was discharged from [facility] about 3 weeks ago following a 9 day hospital stay for the same presentation. Notes that she still smokes occasionally. MY but not using CPAP consistently. Concern for ACS so cardiology consulting and possible plan for [facility] once COPD exacerbation resolving.       No Known Allergies     Past Medical History:   Diagnosis Date    Asthma     CHF (congestive heart failure) (HCC)     Chronic obstructive pulmonary disease (HCC)     Endocrine disease     thyroid issues    Gastrointestinal disorder     \"blockage in my stomach\"    Hypertension       Current Facility-Administered Medications   Medication Dose Route Frequency    albuterol (PROVENTIL VENTOLIN) nebulizer solution 2.5 mg  2.5 mg Nebulization Q6HWA RT    hydrALAZINE (APRESOLINE) 20 mg/mL injection 10 mg  10 mg IntraVENous Q6H PRN    metoprolol tartrate (LOPRESSOR) tablet 12.5 mg  12.5 mg Oral Q12H    azithromycin (ZITHROMAX) tablet 500 mg  500 mg Oral Q24H    methylPREDNISolone (PF) (SOLU-MEDROL) injection 40 mg  40 mg IntraVENous Q6H    escitalopram oxalate (LEXAPRO) tablet 10 mg  10 mg Oral QPM    QUEtiapine (SEROquel) tablet 25 mg  25 mg Oral QHS    oxyCODONE-acetaminophen (PERCOCET 10)  mg per tablet 2 Tab  2 Tab Oral Q8H PRN    cloNIDine HCl (CATAPRES) tablet 0.1 mg  0.1 mg Oral BID    ALPRAZolam (XANAX) tablet 1 mg  1 mg Oral BID    amLODIPine (NORVASC) tablet 10 mg  10 mg Oral DAILY    famotidine (PEPCID) tablet 20 mg  20 mg Oral DAILY    montelukast (SINGULAIR) tablet 10 mg  10 mg Oral QHS    tiotropium (SPIRIVA) inhalation capsule 18 mcg  1 Cap Inhalation DAILY    sodium chloride (NS) flush 5-40 mL  5-40 mL IntraVENous Q8H    sodium chloride (NS) flush 5-40 mL  5-40 mL IntraVENous PRN    acetaminophen (TYLENOL) tablet 650 mg  650 mg Oral Q4H PRN    aspirin chewable tablet 81 mg 81 mg Oral DAILY    atorvastatin (LIPITOR) tablet 20 mg  20 mg Oral QHS    budesonide (PULMICORT) 500 mcg/2 ml nebulizer suspension  500 mcg Nebulization BID RT    arformoterol (BROVANA) neb solution 15 mcg  15 mcg Nebulization BID RT    roflumilast (DALIRESP) tablet 500 mcg  500 mcg Oral DAILY         Objective:   Vital Signs:    Visit Vitals  /81   Pulse 66   Temp 98 °F (36.7 °C)   Resp 18   Ht 4' 11\" (1.499 m)   Wt 97.5 kg (215 lb)   SpO2 97%   Breastfeeding? No   BMI 43.42 kg/m²       O2 Device: Nasal cannula   O2 Flow Rate (L/min): 2 l/min   Temp (24hrs), Av.1 °F (36.7 °C), Min:98 °F (36.7 °C), Max:98.3 °F (36.8 °C)       Intake/Output:   Last shift:      No intake/output data recorded. Last 3 shifts: No intake/output data recorded. No intake or output data in the 24 hours ending 19 1044    Physical Exam:     General:  Alert, Awake, NAD, cooperative, no distress, appears stated age. Head:   Normocephalic, without obvious abnormality, atraumatic. Eye:   Conjunctivae/corneas clear. PERRLA, no scleral icterus, no pallor, no cyanosis  Nose:   Nares normal. Septum midline. Mucosa normal without erythema/exudate. No drainage/discharge. No sinus tenderness. Throat:  Lips, mucosa, and tongue normal. Teeth and gums normal. No tonsillar enlargement, no erythema, no exudates, no oral thrush  Neck:   Supple, symmetric, thyroid: no enlargement/tenderness/nodule, no JVD, no carotid bruit, no lymphadenopathy. Trachea midline  Back & spine: Symmetric, no curvature. Chest wall: No tenderness or deformity. No rash  Lung:   Breath sounds with good movement heard throughout with end expiratory wheezes  Heart:   Regular rate & rhythm.  S1 S2 present, no murmur, no gallop, no click, no rub  Abdomen:  Soft, NT, ND, +BS, no masses, no organomegaly  Extremities:  No pedal edema, no cyanosis, no clubbing  Pulses: 2+ and symmetric in DP  Lymphatic:  No cervical, supraclavicular and axillary palpable lymphadenopathy. Musculoskeletal: No joint swelling or tenderness. Neurologic:  Grossly non focal.        Data:         Chemistry No results for input(s): GLU, NA, K, CL, CO2, BUN, CREA, CA, MG, PHOS, AGAP, BUCR, TBIL, GPT, AP, TP, ALB, GLOB, AGRAT in the last 72 hours. Lactic Acid Lactic acid   Date Value Ref Range Status   01/13/2017 1.4 0.4 - 2.0 MMOL/L Final     No results for input(s): LAC in the last 72 hours. Liver Enzymes Protein, total   Date Value Ref Range Status   03/08/2019 6.9 6.4 - 8.2 g/dL Final     Albumin   Date Value Ref Range Status   03/08/2019 3.7 3.4 - 5.0 g/dL Final     Globulin   Date Value Ref Range Status   03/08/2019 3.2 2.0 - 4.0 g/dL Final     A-G Ratio   Date Value Ref Range Status   03/08/2019 1.2 0.8 - 1.7   Final     AST (SGOT)   Date Value Ref Range Status   03/08/2019 14 (L) 15 - 37 U/L Final     Alk. phosphatase   Date Value Ref Range Status   03/08/2019 116 45 - 117 U/L Final     No results for input(s): TP, ALB, GLOB, AGRAT, SGOT, GPT, AP, TBIL in the last 72 hours. No lab exists for component: DBIL     CBC w/Diff No results for input(s): WBC, RBC, HGB, HCT, PLT, GRANS, LYMPH, EOS, HGBEXT, HCTEXT, PLTEXT, HGBEXT, HCTEXT, PLTEXT in the last 72 hours. Cardiac Enzymes No results found for: CPK, CK, CKMMB, CKMB, RCK3, CKMBT, CKNDX, CKND1, JOSE, TROPT, TROIQ, SURY, TROPT, TNIPOC, BNP, BNPP     BNP Lab Results   Component Value Date/Time    BNP 19.9 12/07/2015 05:23 PM    BNP 3.4 11/01/2015 03:20 PM        Coagulation Recent Labs     03/12/19  0328 03/11/19  0106   APTT 23.0 81.5*         Thyroid  Lab Results   Component Value Date/Time    TSH 0.02 (L) 03/12/2019 03:28 AM          Lipid Panel No results found for: CHOL, CHOLPOCT, CHOLX, CHLST, CHOLV, 384016, HDL, LDL, LDLC, DLDLP, 891981, VLDLC, VLDL, TGLX, TRIGL, TRIGP, TGLPOCT, CHHD, CHHDX     ABG No results for input(s): PHI, PHI, POC2, PCO2I, PO2, PO2I, HCO3, HCO3I, FIO2, FIO2I in the last 72 hours. Urinalysis Lab Results   Component Value Date/Time    Color YELLOW 04/18/2017 02:30 PM    Appearance CLEAR 04/18/2017 02:30 PM    Specific gravity 1.016 04/18/2017 02:30 PM    pH (UA) 5.5 04/18/2017 02:30 PM    Protein NEGATIVE  04/18/2017 02:30 PM    Glucose NEGATIVE  04/18/2017 02:30 PM    Ketone 40 (A) 04/18/2017 02:30 PM    Bilirubin NEGATIVE  04/18/2017 02:30 PM    Urobilinogen 1.0 04/18/2017 02:30 PM    Nitrites NEGATIVE  04/18/2017 02:30 PM    Leukocyte Esterase NEGATIVE  04/18/2017 02:30 PM    Epithelial cells FEW 01/11/2017 06:24 PM    Bacteria FEW (A) 01/11/2017 06:24 PM    WBC NONE 01/11/2017 06:24 PM    RBC NONE 01/11/2017 06:24 PM        Micro  No results for input(s): SDES, CULT in the last 72 hours. No results for input(s): CULT in the last 72 hours. XR (Most Recent). CXR reviewed by me and compared with previous CXR Results from Hospital Encounter encounter on 03/08/19   XR CHEST PORT    Narrative EXAMINATION: Chest single view    INDICATION: COPD exacerbation    COMPARISON: 2/27/2018    FINDINGS: Single frontal view of the chest obtained. Underpenetration limits  evaluation. Borderline prominent cardiac silhouette. Prominent perihilar  interstitium. Minimal aortic arch calcifications. No evidence of pneumothorax. No acute osseous findings. Impression IMPRESSION:    Borderline prominent cardiac silhouette with suspected mild interstitial  infiltrate/edema. CT (Most Recent) Results from Hospital Encounter encounter on 12/19/17   CT CHEST W CONT    Narrative CT CHEST WITH ENHANCEMENT    INDICATION: History of COPD, asthma, congestive heart failure with one week of  constant chest tightness, recent hospitalization for pneumonia completed  antibiotics and steroids without improvement.     TECHNIQUE: Axial images obtained from the thoracic inlet to the level of the  diaphragm following uneventful administration of 100 cc Isovue-300 nonionic  intravenous contrast. Coronal and sagittal reformatted images were obtained. All CT scans at this facility are performed using dose optimization technique as  appropriate to a performed exam, to include automated exposure control,  adjustment of the mA and/or kV according to patient size (including appropriate  matching first site-specific examinations), or use of iterative reconstruction  technique. COMPARISON: CT chest 1/26/2017. CHEST FINDINGS:     Thyroid: Similar right thyroid enlargement with likely multifocal nodules  measuring up to 2 cm. Pericardium/ Heart: Heart size is normal. No pericardial effusion. No definite  calcified coronary arteriosclerosis. Aorta/ Vessels: No aneurysm or dissection. Lymph Nodes: Unremarkable. .    Lungs: Trachea and central bronchial tree are patent. Similar mild subsegmental  atelectasis or scarring in the left lower lobe. No suspicious pulmonary nodule,  consolidation or mass. Pleura: No effusion. Upper Abdomen: Unchanged hepatic hypodensities, the largest is 1.6 cm with cyst  density. Gallbladder is decompressed. 1.4 cm left upper pole renal cyst.    Bones/soft tissues: Unremarkable. Impression IMPRESSION:    No acute findings to explain patient's symptoms. Right thyroid enlargement and multinodular goiter, previously and better  evaluated with ultrasound. EKG No results found for this or any previous visit. ECHO No results found for this or any previous visit. PFT No flowsheet data found.      Other ASA reactivity:   Pre-albumin:   Ionized Calcium:   NH4:   T3, FT4:  Cortisol:  Urine Osm:  Urine Lytes:   HbA1c:      Recent Results (from the past 24 hour(s))   NUCLEAR CARDIAC STRESS TEST    Collection Time: 03/13/19  9:38 AM   Result Value Ref Range    Target  bpm    Exercise duration time 00:04:00     Stress Base Systolic  mmHg    Stress Base Diastolic BP 81 mmHg    Post peak  BPM    Baseline HR 65 BPM    Estimated workload 1.0 METS    Baseline /81 mmHg Percent HR 74 %    Stress Rate Pressure Product 16,432 BPM*mmHg         High complexity decision making was performed during the evaluation of this patient at high risk for decompensation with multiple organ involvement     Above mentioned total time spent on reviewing the case/medical record/data/notes/EMR/patient examination/documentation/coordinating care with nurse/consultants, exclusive of procedures with complex decision making performed and > 50% time spent in face to face evaluation.     Tracey Goodman MD  3/13/2019

## 2019-03-13 NOTE — ROUTINE PROCESS
Bedside and Verbal shift change report given to [address] [place] [address] (oncoming nurse) by Mona Chung RN (offgoing nurse). Report included the following information SBAR, Kardex, ED Summary, Intake/Output and Recent Results.  Patient alert and in the bathroom bathing for tests

## 2019-03-13 NOTE — ROUTINE PROCESS
Bedside and Verbal shift change report given to Mary Domingo (oncoming nurse) by Marybeth Hennessy (offgoing nurse). Report included the following information SBAR.
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
## 2019-03-13 NOTE — PROGRESS NOTES
Patient was given 10.81 milliCuries of 99mTc-Sestamibi for the resting images. Patient was also given 33.0 milliCuries of 99mTc-Sestamibi for the stress images. No CT acquired due to complaints of back pain. Told patient to hold still multiple times during images, but she would not listen since her back was hurting too much. Injected with 0.4mg Lexiscan. Patient's armband was left on and sent back to room.
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
## 2019-03-13 NOTE — PROGRESS NOTES
[facility] Hospitalist Group  Progress Note    Patient: Elesa Epley Age: 46 y.o. : 1965 MR#: [id number] SSN: [id number]  Date/Time: 3/13/2019     Subjective:     Patient lying in bed in NAD, c/o dyspnea on minimal exertion    Assessment/Plan:   1. COPD acute / asthma overlap / Acute hypoxic hypercarbic respiratory failure   - nebs, steroids  D/w Pulm  PT, OT    2 severe myalgia   -  Pain control    3 h/o opiates positive in urine     4- d/w Cardio, ST is low risk    Psyche input noted, on seroquel       full CODE   PT, OT    Case discussed with:  [x]Patient  [] Family ( In room with patient )    []Nursing  []Case Management  DVT Prophylaxis:  []Lovenox  []Hep SQ  []SCDs  []Coumadin   []On Heparin gtt          Objective:   VS:   Visit Vitals  /81 (BP 1 Location: Left arm, BP Patient Position: At rest)   Pulse 81   Temp 97.8 °F (36.6 °C)   Resp 18   Ht 4' 11\" (1.499 m)   Wt 97.5 kg (215 lb)   SpO2 96%   Breastfeeding?  No   BMI 43.42 kg/m²      Tmax/24hrs: Temp (24hrs), Av °F (36.7 °C), Min:97.8 °F (36.6 °C), Max:98.1 °F (36.7 °C)  IOBRIEF  No intake or output data in the 24 hours ending 19 1420    General:  Awake, alert  Cardiovascular:  S1S2+, RRR  Pulmonary:  Some wheezing b/l  GI:  Soft, BS+, NT, ND  Extremities:  trace edema        Medications:   Current Facility-Administered Medications   Medication Dose Route Frequency    QUEtiapine (SEROquel) tablet 50 mg  50 mg Oral QHS    cloNIDine HCl (CATAPRES) tablet 0.2 mg  0.2 mg Oral BID    albuterol (PROVENTIL VENTOLIN) nebulizer solution 2.5 mg  2.5 mg Nebulization Q6HWA RT    hydrALAZINE (APRESOLINE) 20 mg/mL injection 10 mg  10 mg IntraVENous Q6H PRN    metoprolol tartrate (LOPRESSOR) tablet 12.5 mg  12.5 mg Oral Q12H    azithromycin (ZITHROMAX) tablet 500 mg  500 mg Oral Q24H    methylPREDNISolone (PF) (SOLU-MEDROL) injection 40 mg  40 mg IntraVENous Q6H    escitalopram oxalate (LEXAPRO) tablet 10 mg  10 mg Oral QPM    oxyCODONE-acetaminophen (PERCOCET 10)  mg per tablet 2 Tab  2 Tab Oral Q8H PRN    ALPRAZolam (XANAX) tablet 1 mg  1 mg Oral BID    amLODIPine (NORVASC) tablet 10 mg  10 mg Oral DAILY    famotidine (PEPCID) tablet 20 mg  20 mg Oral DAILY    montelukast (SINGULAIR) tablet 10 mg  10 mg Oral QHS    tiotropium (SPIRIVA) inhalation capsule 18 mcg  1 Cap Inhalation DAILY    sodium chloride (NS) flush 5-40 mL  5-40 mL IntraVENous Q8H    sodium chloride (NS) flush 5-40 mL  5-40 mL IntraVENous PRN    acetaminophen (TYLENOL) tablet 650 mg  650 mg Oral Q4H PRN    aspirin chewable tablet 81 mg  81 mg Oral DAILY    atorvastatin (LIPITOR) tablet 20 mg  20 mg Oral QHS    budesonide (PULMICORT) 500 mcg/2 ml nebulizer suspension  500 mcg Nebulization BID RT    arformoterol (BROVANA) neb solution 15 mcg  15 mcg Nebulization BID RT    roflumilast (DALIRESP) tablet 500 mcg  500 mcg Oral DAILY       Labs:    No results for input(s): WBC, HGB, HCT, PLT, HGBEXT, HCTEXT, PLTEXT, HGBEXT, HCTEXT, PLTEXT in the last 72 hours. No results for input(s): NA, K, CL, CO2, GLU, BUN, CREA, CA, MG, PHOS, ALB, TBIL, SGOT, ALT, INR in the last 72 hours.     No lab exists for component: Donekimmie Robbins      Signed By: Rc Alvarado MD     March 13, 2019

## 2019-03-13 NOTE — PROGRESS NOTES
NUTRITION    Nursing Referral: [facility]     RECOMMENDATIONS / PLAN:     - Continue current nutrition interventions. - Continue RD inpatient monitoring and evaluation. NUTRITION INTERVENTIONS & DIAGNOSIS:     [x] Meals/snacks: modify composition  [x] Medical food supplement therapy: Ensure Enlive, once daily    Nutrition Diagnosis: Predicted inadequate energy intake related to decreased appetite as evidenced by pt with poor meal intake x 1 month PTA. ASSESSMENT:     3/13: Pt reports poor meal intake x 1 month PTA with weight loss 2/2 recent stress in her life. Per nursing pt eating well with good appetite but did not eat breakfast this am due to stress test.  3/11: Pt sleeping during visit. Per RN pt only consumed about 15% of breakfast this am, stating she wasn't hungry, pt reporting to RN that she is hungry for lunch. Fluctuations in weight hx per chart review. Tolerating diet. Average po intake adequate to meet patients estimated nutritional needs:   [] Yes     [] No   [x] Unable to determine at this time    Diet: DIET CARDIAC Regular  DIET NUTRITIONAL SUPPLEMENTS Breakfast; ENSURE ENLIVE      Food Allergies: NKFA  Current Appetite:   [x] Good     [x] Fair   [] Poor     [] Other  Appetite/meal intake prior to admission:   [] Good     [] Fair     [x] Poor x 1 month 2/2 stress    [] Other:  Feeding Limitations:  [] Swallowing difficulty    [] Chewing difficulty    [] Other:  Current Meal Intake: No data found. BM: unknown  Skin Integrity: WDL  Edema:   [x] No     [] Yes   Pertinent Medications: Reviewed: lipitor, pepcid, steroid,    No results for input(s): NA, K, CL, CO2, GLU, BUN, CREA, CA, MG, PHOS, ALB, PREALB, TBIL, SGOT, ALT in the last 72 hours.   No intake or output data in the 24 hours ending 03/13/19 1131    Anthropometrics:  Ht Readings from Last 1 Encounters:   03/13/19 4' 11\" (1.499 m)     Last 3 Recorded Weights in this Encounter    03/12/19 0431 03/13/19 0438 03/13/19 0939   Weight: 96.6 kg (213 lb) 97.5 kg (215 lb) 97.5 kg (215 lb)     Body mass index is 43.42 kg/m². Obese Class III    Weight History: Pt reports a 55 lb in 2-3 months PTA. Per chart review, pt with fluctuations in weight hx with net weight loss x 2 years and net weight gain x 1 year PTA. Weight Metrics 3/13/2019 1/26/2018 12/21/2017 4/23/2017 4/9/2017 3/10/2017 3/8/2017   Weight 215 lb 200 lb 207 lb 241 lb 273 lb 2.4 oz 252 lb 263 lb 12.8 oz   BMI 43.42 kg/m2 40.4 kg/m2 41.81 kg/m2 44.08 kg/m2 49.96 kg/m2 50.9 kg/m2 53.28 kg/m2        Admitting Diagnosis: Acute bronchitis with chronic obstructive pulmonary disease (COPD) (HCC) [J44.0, J20.9]  T wave inversion in EKG [R94.31]  Pertinent PMHx: COPD, CHF, HTN    Education Needs:        [x] None identified  [] Identified - Not appropriate at this time  []  Identified and addressed - refer to education log  Learning Limitations:   [x] None identified  [] Identified    Cultural, Shinto & ethnic food preferences:  [x] None identified    [] Identified and addressed     ESTIMATED NUTRITION NEEDS:     Calories: 3000-6238 kcal (MSJx1.2-1.3) based on  [x] Actual BW: 97 kg      [] IBW   Protein: 78-97 gm (0.8-1 gm/kg) based on  [x] Actual BW      [] IBW   Fluid: 1 mL/kcal     MONITORING & EVALUATION:     Nutrition Goal(s):   1. Po intake of meals will meet >75% of patient estimated nutritional needs within the next 7 days.   Outcome:  [] Met/Ongoing    [x]  Not Met/Progressing    [] New/Initial Goal     Monitoring:   [x] Food and beverage intake   [x] Diet order   [x] Nutrition-focused physical findings   [x] Treatment/therapy   [] Weight   [] Enteral nutrition intake        Previous Recommendations (for follow-up assessments only):     [x]   Implemented       []   Not Implemented (RD to address)      [] No Longer Appropriate     [] No Recommendation Made     Discharge Planning: cardiac diet  [x] Participated in care planning, discharge planning, & interdisciplinary rounds as appropriate      Gabriel Taylor RD   Pager: [phone]

## 2019-03-14 VITALS
WEIGHT: 212.8 LBS | OXYGEN SATURATION: 99 % | DIASTOLIC BLOOD PRESSURE: 79 MMHG | HEIGHT: 59 IN | HEART RATE: 71 BPM | SYSTOLIC BLOOD PRESSURE: 148 MMHG | TEMPERATURE: 98.1 F | RESPIRATION RATE: 18 BRPM | BODY MASS INDEX: 42.9 KG/M2

## 2019-03-14 PROCEDURE — 74011250636 HC RX REV CODE- 250/636: Performed by: EMERGENCY MEDICINE

## 2019-03-14 PROCEDURE — 74011000250 HC RX REV CODE- 250: Performed by: INTERNAL MEDICINE

## 2019-03-14 PROCEDURE — 74011250637 HC RX REV CODE- 250/637: Performed by: NURSE PRACTITIONER

## 2019-03-14 PROCEDURE — 74011250637 HC RX REV CODE- 250/637: Performed by: INTERNAL MEDICINE

## 2019-03-14 PROCEDURE — 77030038269 HC DRN EXT URIN PURWCK BARD -A

## 2019-03-14 PROCEDURE — 97161 PT EVAL LOW COMPLEX 20 MIN: CPT

## 2019-03-14 PROCEDURE — 74011636637 HC RX REV CODE- 636/637: Performed by: INTERNAL MEDICINE

## 2019-03-14 PROCEDURE — 74011250637 HC RX REV CODE- 250/637: Performed by: EMERGENCY MEDICINE

## 2019-03-14 PROCEDURE — 94640 AIRWAY INHALATION TREATMENT: CPT

## 2019-03-14 PROCEDURE — 74011250637 HC RX REV CODE- 250/637: Performed by: HOSPITALIST

## 2019-03-14 PROCEDURE — 97530 THERAPEUTIC ACTIVITIES: CPT

## 2019-03-14 RX ORDER — OXYCODONE AND ACETAMINOPHEN 10; 325 MG/1; MG/1
1 TABLET ORAL
Qty: 12 TAB | Refills: 0 | Status: SHIPPED | OUTPATIENT
Start: 2019-03-14 | End: 2019-03-18

## 2019-03-14 RX ORDER — CLONIDINE HYDROCHLORIDE 0.2 MG/1
0.2 TABLET ORAL 2 TIMES DAILY
Qty: 60 TAB | Refills: 0 | Status: SHIPPED | OUTPATIENT
Start: 2019-03-14 | End: 2020-04-27 | Stop reason: SDUPTHER

## 2019-03-14 RX ORDER — PREDNISONE 20 MG/1
40 TABLET ORAL
Status: DISCONTINUED | OUTPATIENT
Start: 2019-03-14 | End: 2019-03-14 | Stop reason: HOSPADM

## 2019-03-14 RX ORDER — METOPROLOL TARTRATE 25 MG/1
12.5 TABLET, FILM COATED ORAL EVERY 12 HOURS
Qty: 30 TAB | Refills: 0 | Status: SHIPPED | OUTPATIENT
Start: 2019-03-14 | End: 2020-10-10

## 2019-03-14 RX ORDER — NALOXONE HYDROCHLORIDE 4 MG/.1ML
SPRAY NASAL
Qty: 1 EACH | Refills: 0 | Status: SHIPPED | OUTPATIENT
Start: 2019-03-14

## 2019-03-14 RX ORDER — ATORVASTATIN CALCIUM 20 MG/1
20 TABLET, FILM COATED ORAL
Qty: 30 TAB | Refills: 0 | Status: SHIPPED | OUTPATIENT
Start: 2019-03-14

## 2019-03-14 RX ORDER — BUDESONIDE AND FORMOTEROL FUMARATE DIHYDRATE 160; 4.5 UG/1; UG/1
2 AEROSOL RESPIRATORY (INHALATION)
Status: DISCONTINUED | OUTPATIENT
Start: 2019-03-14 | End: 2019-03-14 | Stop reason: HOSPADM

## 2019-03-14 RX ORDER — ALBUTEROL SULFATE 0.83 MG/ML
2.5 SOLUTION RESPIRATORY (INHALATION)
Qty: 60 EACH | Refills: 0 | Status: SHIPPED | OUTPATIENT
Start: 2019-03-14 | End: 2019-07-25

## 2019-03-14 RX ORDER — ARFORMOTEROL TARTRATE 15 UG/2ML
15 SOLUTION RESPIRATORY (INHALATION) 2 TIMES DAILY
Qty: 60 VIAL | Refills: 0 | Status: SHIPPED | OUTPATIENT
Start: 2019-03-14 | End: 2020-09-14 | Stop reason: SDUPTHER

## 2019-03-14 RX ORDER — QUETIAPINE FUMARATE 50 MG/1
50 TABLET, FILM COATED ORAL
Qty: 30 TAB | Refills: 0 | Status: SHIPPED | OUTPATIENT
Start: 2019-03-14 | End: 2019-07-26 | Stop reason: SDUPTHER

## 2019-03-14 RX ORDER — BUDESONIDE 0.5 MG/2ML
500 INHALANT ORAL 2 TIMES DAILY
Qty: 60 EACH | Refills: 0 | Status: SHIPPED | OUTPATIENT
Start: 2019-03-14 | End: 2020-09-14 | Stop reason: SDUPTHER

## 2019-03-14 RX ORDER — ESCITALOPRAM OXALATE 10 MG/1
10 TABLET ORAL EVERY EVENING
Qty: 30 TAB | Refills: 0 | Status: SHIPPED | OUTPATIENT
Start: 2019-03-14 | End: 2020-09-07

## 2019-03-14 RX ORDER — PREDNISONE 10 MG/1
TABLET ORAL
Qty: 21 TAB | Refills: 0 | Status: SHIPPED | OUTPATIENT
Start: 2019-03-14 | End: 2019-07-26

## 2019-03-14 RX ORDER — GUAIFENESIN 100 MG/5ML
81 LIQUID (ML) ORAL DAILY
Qty: 30 TAB | Refills: 0 | Status: SHIPPED | OUTPATIENT
Start: 2019-03-15 | End: 2019-10-17

## 2019-03-14 RX ORDER — ALBUTEROL SULFATE 90 UG/1
2 AEROSOL, METERED RESPIRATORY (INHALATION)
Qty: 1 INHALER | Refills: 0 | Status: SHIPPED | OUTPATIENT
Start: 2019-03-14 | End: 2019-07-25

## 2019-03-14 RX ORDER — DOCUSATE SODIUM 100 MG/1
100 CAPSULE, LIQUID FILLED ORAL 2 TIMES DAILY
Qty: 60 CAP | Refills: 0 | Status: ON HOLD | OUTPATIENT
Start: 2019-03-14 | End: 2019-10-17 | Stop reason: SDUPTHER

## 2019-03-14 RX ORDER — AMLODIPINE BESYLATE 10 MG/1
10 TABLET ORAL DAILY
Qty: 30 TAB | Refills: 0 | Status: SHIPPED | OUTPATIENT
Start: 2019-03-14 | End: 2019-07-05

## 2019-03-14 RX ADMIN — METOPROLOL TARTRATE 12.5 MG: 25 TABLET ORAL at 09:05

## 2019-03-14 RX ADMIN — OXYCODONE AND ACETAMINOPHEN 2 TABLET: 10; 325 TABLET ORAL at 00:57

## 2019-03-14 RX ADMIN — PREDNISONE 40 MG: 20 TABLET ORAL at 12:04

## 2019-03-14 RX ADMIN — ALBUTEROL SULFATE 2.5 MG: 2.5 SOLUTION RESPIRATORY (INHALATION) at 08:04

## 2019-03-14 RX ADMIN — BUDESONIDE 500 MCG: 0.5 INHALANT RESPIRATORY (INHALATION) at 08:04

## 2019-03-14 RX ADMIN — ALPRAZOLAM 1 MG: 0.5 TABLET ORAL at 09:06

## 2019-03-14 RX ADMIN — FAMOTIDINE 20 MG: 20 TABLET ORAL at 09:05

## 2019-03-14 RX ADMIN — AZITHROMYCIN 500 MG: 250 TABLET, FILM COATED ORAL at 05:07

## 2019-03-14 RX ADMIN — METHYLPREDNISOLONE SODIUM SUCCINATE 20 MG: 40 INJECTION, POWDER, FOR SOLUTION INTRAMUSCULAR; INTRAVENOUS at 05:07

## 2019-03-14 RX ADMIN — AMLODIPINE BESYLATE 10 MG: 10 TABLET ORAL at 09:06

## 2019-03-14 RX ADMIN — CLONIDINE HYDROCHLORIDE 0.2 MG: 0.1 TABLET ORAL at 09:05

## 2019-03-14 RX ADMIN — DOCUSATE SODIUM 100 MG: 100 CAPSULE, LIQUID FILLED ORAL at 09:06

## 2019-03-14 RX ADMIN — Medication 10 ML: at 05:07

## 2019-03-14 RX ADMIN — ARFORMOTEROL TARTRATE 15 MCG: 15 SOLUTION RESPIRATORY (INHALATION) at 08:04

## 2019-03-14 RX ADMIN — OXYCODONE AND ACETAMINOPHEN 2 TABLET: 10; 325 TABLET ORAL at 09:04

## 2019-03-14 RX ADMIN — ROFLUMILAST 500 MCG: 500 TABLET ORAL at 09:06

## 2019-03-14 RX ADMIN — Medication 81 MG: at 09:00

## 2019-03-14 RX ADMIN — TIOTROPIUM BROMIDE 18 MCG: 18 CAPSULE ORAL; RESPIRATORY (INHALATION) at 08:04

## 2019-03-14 NOTE — PROGRESS NOTES
Called [facility], [facility], [facility], [facility], and [facility] companies, no one has pt on file for receiving a power wheelchair. Called pt's insurance, they do not have record of paying for a power wheelchair, however they have only been pt's provider since Oct 2017.     Elizabeth Pereira, MSW  Case Management  [phone]

## 2019-03-14 NOTE — PROGRESS NOTES
conducted a Follow up consultation and Spiritual Assessment for Elesa Epley, who is a 46 y.o.,female. The  provided the following Interventions:  Continued the relationship of care and support. Listened empathically. Offered prayer and assurance of continued prayer on patients behalf. Chart reviewed. The following outcomes were achieved:  Patient expressed gratitude for 's visit. Assessment:  There are no further spiritual or Orthodoxy issues which require Spiritual Care Services interventions at this time. Plan:  Chaplains will continue to follow and will provide pastoral care on an as needed/requested basis.  recommends bedside caregivers page  on duty if patient shows signs of acute spiritual or emotional distress.        Chaplain Resident [address] [place] [address]   [phone]

## 2019-03-14 NOTE — PROGRESS NOTES
[facility]  Pulmonary, Critical Care, and Sleep Medicine    Name: Steph Taylor MRN: [id number]   : 1965 Hospital: [address] [place] [address]   Date: 3/14/2019        Pulmonary Follow-up In-Patient Consult                                              Consult requesting physician: Dr. Robin Ramirez  Reason for Consult: COPD Exacerbation    IMPRESSION:   · COPD/asthma overlap with very poor control and frequent admissions for exacerbations now with moderate to severe exacerbation with concern for ACS as well. Anxiety also contributing  · MY - non-compliant; unclear the pressure settings  · Obesity  · HTN  · Chest pain- likely angina- has abnormal ekg concerning with ischemia with deep T waves in inferior and lateral leads.  - her troponin are flat-  Patient is chest pain free. · Tobacco abuse        RECOMMENDATIONS:   · Send home on prednisone taper over another week; ok for DC today  · Continue pulmicort and brovana nebs  · Continue spiriva   · Albuterol for rescue  · Cont CPAP  · Continue daliresp to address frequent AE and obesity  · Nutrition: per primary  · HOB >=30 degree, aggressive pulmonary toileting, incentive spirometry, PT/OT eval and treat   · DVT Prophylaxis: per primary team       Subjective/History:     19     Feels better and ready to go home today, back at baseline. Depressed about daughter's death. Just finished talking to . HPI:  Steph Taylor is a 46 y.o. female with PMHx significant for  COPD/asthma overlap, Obesity, MY and CHF who presents with difficulty breathing. Pt reports one week of worsening COPD symptoms including wheezing and trouble breathing. Her trigger is perfumes and smells. She has chest tightness \"from working so hard to breath\". She was discharged from [place] about 3 weeks ago following a 9 day hospital stay for the same presentation. Notes that she still smokes occasionally. MY but not using CPAP consistently.   Concern for ACS so cardiology consulting and possible plan for C once COPD exacerbation resolving.       No Known Allergies     Past Medical History:   Diagnosis Date    Asthma     CHF (congestive heart failure) (HCC)     Chronic obstructive pulmonary disease (HCC)     Endocrine disease     thyroid issues    Gastrointestinal disorder     \"blockage in my stomach\"    Hypertension       Current Facility-Administered Medications   Medication Dose Route Frequency    QUEtiapine (SEROquel) tablet 50 mg  50 mg Oral QHS    cloNIDine HCl (CATAPRES) tablet 0.2 mg  0.2 mg Oral BID    enoxaparin (LOVENOX) injection 40 mg  40 mg SubCUTAneous Q24H    methylPREDNISolone (PF) (SOLU-MEDROL) injection 20 mg  20 mg IntraVENous Q6H    docusate sodium (COLACE) capsule 100 mg  100 mg Oral BID    bisacodyl (DULCOLAX) suppository 10 mg  10 mg Rectal DAILY PRN    albuterol (PROVENTIL VENTOLIN) nebulizer solution 2.5 mg  2.5 mg Nebulization Q6HWA RT    hydrALAZINE (APRESOLINE) 20 mg/mL injection 10 mg  10 mg IntraVENous Q6H PRN    metoprolol tartrate (LOPRESSOR) tablet 12.5 mg  12.5 mg Oral Q12H    azithromycin (ZITHROMAX) tablet 500 mg  500 mg Oral Q24H    escitalopram oxalate (LEXAPRO) tablet 10 mg  10 mg Oral QPM    oxyCODONE-acetaminophen (PERCOCET 10)  mg per tablet 2 Tab  2 Tab Oral Q8H PRN    ALPRAZolam (XANAX) tablet 1 mg  1 mg Oral BID    amLODIPine (NORVASC) tablet 10 mg  10 mg Oral DAILY    famotidine (PEPCID) tablet 20 mg  20 mg Oral DAILY    montelukast (SINGULAIR) tablet 10 mg  10 mg Oral QHS    tiotropium (SPIRIVA) inhalation capsule 18 mcg  1 Cap Inhalation DAILY    sodium chloride (NS) flush 5-40 mL  5-40 mL IntraVENous Q8H    sodium chloride (NS) flush 5-40 mL  5-40 mL IntraVENous PRN    acetaminophen (TYLENOL) tablet 650 mg  650 mg Oral Q4H PRN    aspirin chewable tablet 81 mg  81 mg Oral DAILY    atorvastatin (LIPITOR) tablet 20 mg  20 mg Oral QHS    budesonide (PULMICORT) 500 mcg/2 ml nebulizer suspension 500 mcg Nebulization BID RT    arformoterol (BROVANA) neb solution 15 mcg  15 mcg Nebulization BID RT    roflumilast (DALIRESP) tablet 500 mcg  500 mcg Oral DAILY         Objective:   Vital Signs:    Visit Vitals  /82 (BP 1 Location: Right arm, BP Patient Position: At rest)   Pulse 72   Temp 97.9 °F (36.6 °C)   Resp 18   Ht 4' 11\" (1.499 m)   Wt 96.5 kg (212 lb 12.8 oz)   SpO2 97%   Breastfeeding? No   BMI 42.98 kg/m²       O2 Device: Nasal cannula   O2 Flow Rate (L/min): 3 l/min   Temp (24hrs), Av.7 °F (36.5 °C), Min:97 °F (36.1 °C), Max:98 °F (36.7 °C)       Intake/Output:   Last shift:      No intake/output data recorded. Last 3 shifts: No intake/output data recorded. No intake or output data in the 24 hours ending 19 1043    Physical Exam:     General:  Alert, Awake, NAD, cooperative, no distress, appears stated age. Head:   Normocephalic, without obvious abnormality, atraumatic. Eye:   Conjunctivae/corneas clear. no scleral icterus, no pallor  Chest wall: No tenderness or deformity. No rash  Lung:   Breath sounds with good movement heard throughout with end expiratory wheezes  Heart:   Regular rate & rhythm. S1 S2 present, no murmur, no gallop, no click, no rub  Abdomen:  Soft, NT, ND, +BS, no masses, no organomegaly  Extremities:  No pedal edema, no cyanosis, no clubbing  Pulses: 2+ and symmetric in DP   Musculoskeletal: No joint swelling or tenderness. Neurologic:  Grossly non focal.        Data:         Chemistry No results for input(s): GLU, NA, K, CL, CO2, BUN, CREA, CA, MG, PHOS, AGAP, BUCR, TBIL, GPT, AP, TP, ALB, GLOB, AGRAT in the last 72 hours. Lactic Acid Lactic acid   Date Value Ref Range Status   2017 1.4 0.4 - 2.0 MMOL/L Final     No results for input(s): LAC in the last 72 hours.      Liver Enzymes Protein, total   Date Value Ref Range Status   2019 6.9 6.4 - 8.2 g/dL Final     Albumin   Date Value Ref Range Status   2019 3.7 3.4 - 5.0 g/dL Final Globulin   Date Value Ref Range Status   03/08/2019 3.2 2.0 - 4.0 g/dL Final     A-G Ratio   Date Value Ref Range Status   03/08/2019 1.2 0.8 - 1.7   Final     AST (SGOT)   Date Value Ref Range Status   03/08/2019 14 (L) 15 - 37 U/L Final     Alk. phosphatase   Date Value Ref Range Status   03/08/2019 116 45 - 117 U/L Final     No results for input(s): TP, ALB, GLOB, AGRAT, SGOT, GPT, AP, TBIL in the last 72 hours. No lab exists for component: DBIL     CBC w/Diff No results for input(s): WBC, RBC, HGB, HCT, PLT, GRANS, LYMPH, EOS, HGBEXT, HCTEXT, PLTEXT, HGBEXT, HCTEXT, PLTEXT in the last 72 hours. Cardiac Enzymes No results found for: CPK, CK, CKMMB, CKMB, RCK3, CKMBT, CKNDX, CKND1, JOSE, TROPT, TROIQ, SURY, TROPT, TNIPOC, BNP, BNPP     BNP Lab Results   Component Value Date/Time    BNP 19.9 12/07/2015 05:23 PM    BNP 3.4 11/01/2015 03:20 PM        Coagulation Recent Labs     03/12/19  0328   APTT 23.0         Thyroid  Lab Results   Component Value Date/Time    TSH 0.02 (L) 03/12/2019 03:28 AM          Lipid Panel No results found for: CHOL, CHOLPOCT, CHOLX, CHLST, CHOLV, 426443, HDL, LDL, LDLC, DLDLP, 592973, VLDLC, VLDL, TGLX, TRIGL, TRIGP, TGLPOCT, CHHD, CHHDX     ABG No results for input(s): PHI, PHI, POC2, PCO2I, PO2, PO2I, HCO3, HCO3I, FIO2, FIO2I in the last 72 hours.      Urinalysis Lab Results   Component Value Date/Time    Color YELLOW 04/18/2017 02:30 PM    Appearance CLEAR 04/18/2017 02:30 PM    Specific gravity 1.016 04/18/2017 02:30 PM    pH (UA) 5.5 04/18/2017 02:30 PM    Protein NEGATIVE  04/18/2017 02:30 PM    Glucose NEGATIVE  04/18/2017 02:30 PM    Ketone 40 (A) 04/18/2017 02:30 PM    Bilirubin NEGATIVE  04/18/2017 02:30 PM    Urobilinogen 1.0 04/18/2017 02:30 PM    Nitrites NEGATIVE  04/18/2017 02:30 PM    Leukocyte Esterase NEGATIVE  04/18/2017 02:30 PM    Epithelial cells FEW 01/11/2017 06:24 PM    Bacteria FEW (A) 01/11/2017 06:24 PM    WBC NONE 01/11/2017 06:24 PM    RBC NONE 01/11/2017 06:24 PM        Micro  No results for input(s): SDES, CULT in the last 72 hours. No results for input(s): CULT in the last 72 hours. XR (Most Recent). CXR reviewed by me and compared with previous CXR Results from Hospital Encounter encounter on 03/08/19   XR CHEST PORT    Narrative EXAMINATION: Chest single view    INDICATION: COPD exacerbation    COMPARISON: 2/27/2018    FINDINGS: Single frontal view of the chest obtained. Underpenetration limits  evaluation. Borderline prominent cardiac silhouette. Prominent perihilar  interstitium. Minimal aortic arch calcifications. No evidence of pneumothorax. No acute osseous findings. Impression IMPRESSION:    Borderline prominent cardiac silhouette with suspected mild interstitial  infiltrate/edema. CT (Most Recent) Results from Hospital Encounter encounter on 12/19/17   CT CHEST W CONT    Narrative CT CHEST WITH ENHANCEMENT    INDICATION: History of COPD, asthma, congestive heart failure with one week of  constant chest tightness, recent hospitalization for pneumonia completed  antibiotics and steroids without improvement. TECHNIQUE: Axial images obtained from the thoracic inlet to the level of the  diaphragm following uneventful administration of 100 cc Isovue-300 nonionic  intravenous contrast. Coronal and sagittal reformatted images were obtained. All CT scans at this facility are performed using dose optimization technique as  appropriate to a performed exam, to include automated exposure control,  adjustment of the mA and/or kV according to patient size (including appropriate  matching first site-specific examinations), or use of iterative reconstruction  technique. COMPARISON: CT chest 1/26/2017. CHEST FINDINGS:     Thyroid: Similar right thyroid enlargement with likely multifocal nodules  measuring up to 2 cm. Pericardium/ Heart: Heart size is normal. No pericardial effusion.  No definite  calcified coronary arteriosclerosis. Aorta/ Vessels: No aneurysm or dissection. Lymph Nodes: Unremarkable. .    Lungs: Trachea and central bronchial tree are patent. Similar mild subsegmental  atelectasis or scarring in the left lower lobe. No suspicious pulmonary nodule,  consolidation or mass. Pleura: No effusion. Upper Abdomen: Unchanged hepatic hypodensities, the largest is 1.6 cm with cyst  density. Gallbladder is decompressed. 1.4 cm left upper pole renal cyst.    Bones/soft tissues: Unremarkable. Impression IMPRESSION:    No acute findings to explain patient's symptoms. Right thyroid enlargement and multinodular goiter, previously and better  evaluated with ultrasound. EKG No results found for this or any previous visit. ECHO No results found for this or any previous visit. PFT No flowsheet data found. Other ASA reactivity:   Pre-albumin:   Ionized Calcium:   NH4:   T3, FT4:  Cortisol:  Urine Osm:  Urine Lytes:   HbA1c:      No results found for this or any previous visit (from the past 24 hour(s)).         Miguel Sifuentes MD  3/14/2019   PCCM Fellow

## 2019-03-14 NOTE — PROGRESS NOTES
Patient sitting in bed in NAD, awake, alert. In good spirits, denies any suicidal or homicidal ideation. Looking forward to getting out of hospital today. Discussed discharge plans. Patient agreeable to [facility]. D/w .

## 2019-03-14 NOTE — PROGRESS NOTES
[facility]  Pulmonary, Critical Care, and Sleep Medicine    Name: Gabe Ambriz MRN: [id number]   : 1965 Hospital: [facility]   Date: 3/14/2019        Pulmonary Follow-up In-Patient Consult                                              Consult requesting physician: Dr. Ez Drummond  Reason for Consult: COPD Exacerbation    IMPRESSION:   · COPD/asthma overlap with very poor control and frequent admissions for exacerbations now with moderate to severe exacerbation with concern for ACS as well. Anxiety also contributing  · MY - non-compliant; unclear the pressure settings  · Obesity  · HTN  · Chest pain- likely angina- has abnormal ekg concerning with ischemia with deep T waves in inferior and lateral leads.  - her troponin are flat-  Patient is chest pain free. · Tobacco abuse        RECOMMENDATIONS:   · We will start tapering steroids- Prednisone 40 mg x3 days, 30 mg x 3 days, 20 mg x 3 days and 10 mg x3 days  · Continue pulmicort and brovana nebs at home  · Continue spiriva and Symbicort   · albuterol for rescue  · Start CPAP at empiric pressure of 12 cm H20  · Continue Daliresp 250 mg daily at home to address frequent AE and obestiy  · Pulmonary rehab  · Will follow up in clinic in 1 week- next week     Subjective/History:     19     Feels improved with breathing  Denies cough  Denies chest pain today  Stress test completed and negative    HPI:  Gabe Ambriz is a 46 y.o. female with PMHx significant for  COPD/asthma overlap, Obesity, MY and CHF who presents with difficulty breathing. Pt reports one week of worsening COPD symptoms including wheezing and trouble breathing. Her trigger is perfumes and smells. She has chest tightness \"from working so hard to breath\". She was discharged from [facility] about 3 weeks ago following a 9 day hospital stay for the same presentation. Notes that she still smokes occasionally. MY but not using CPAP consistently.   Concern for ACS so cardiology consulting and possible plan for LHC once COPD exacerbation resolving.       No Known Allergies     Past Medical History:   Diagnosis Date    Asthma     CHF (congestive heart failure) (HCC)     Chronic obstructive pulmonary disease (HCC)     Endocrine disease     thyroid issues    Gastrointestinal disorder     \"blockage in my stomach\"    Hypertension       Current Facility-Administered Medications   Medication Dose Route Frequency    budesonide-formoterol (SYMBICORT) 160-4.5 mcg/actuation HFA inhaler 2 Puff  2 Puff Inhalation BID    predniSONE (DELTASONE) tablet 40 mg  40 mg Oral DAILY WITH BREAKFAST    QUEtiapine (SEROquel) tablet 50 mg  50 mg Oral QHS    cloNIDine HCl (CATAPRES) tablet 0.2 mg  0.2 mg Oral BID    enoxaparin (LOVENOX) injection 40 mg  40 mg SubCUTAneous Q24H    docusate sodium (COLACE) capsule 100 mg  100 mg Oral BID    bisacodyl (DULCOLAX) suppository 10 mg  10 mg Rectal DAILY PRN    albuterol (PROVENTIL VENTOLIN) nebulizer solution 2.5 mg  2.5 mg Nebulization Q6HWA RT    hydrALAZINE (APRESOLINE) 20 mg/mL injection 10 mg  10 mg IntraVENous Q6H PRN    metoprolol tartrate (LOPRESSOR) tablet 12.5 mg  12.5 mg Oral Q12H    escitalopram oxalate (LEXAPRO) tablet 10 mg  10 mg Oral QPM    oxyCODONE-acetaminophen (PERCOCET 10)  mg per tablet 2 Tab  2 Tab Oral Q8H PRN    ALPRAZolam (XANAX) tablet 1 mg  1 mg Oral BID    amLODIPine (NORVASC) tablet 10 mg  10 mg Oral DAILY    famotidine (PEPCID) tablet 20 mg  20 mg Oral DAILY    montelukast (SINGULAIR) tablet 10 mg  10 mg Oral QHS    tiotropium (SPIRIVA) inhalation capsule 18 mcg  1 Cap Inhalation DAILY    sodium chloride (NS) flush 5-40 mL  5-40 mL IntraVENous Q8H    sodium chloride (NS) flush 5-40 mL  5-40 mL IntraVENous PRN    acetaminophen (TYLENOL) tablet 650 mg  650 mg Oral Q4H PRN    aspirin chewable tablet 81 mg  81 mg Oral DAILY    atorvastatin (LIPITOR) tablet 20 mg  20 mg Oral QHS    budesonide (PULMICORT) 500 mcg/2 ml nebulizer suspension  500 mcg Nebulization BID RT    arformoterol (BROVANA) neb solution 15 mcg  15 mcg Nebulization BID RT    roflumilast (DALIRESP) tablet 500 mcg  500 mcg Oral DAILY         Objective:   Vital Signs:    Visit Vitals  /82 (BP 1 Location: Right arm, BP Patient Position: At rest)   Pulse 72   Temp 97.9 °F (36.6 °C)   Resp 18   Ht 4' 11\" (1.499 m)   Wt 96.5 kg (212 lb 12.8 oz)   SpO2 97%   Breastfeeding? No   BMI 42.98 kg/m²       O2 Device: Nasal cannula   O2 Flow Rate (L/min): 3 l/min   Temp (24hrs), Av.7 °F (36.5 °C), Min:97 °F (36.1 °C), Max:98 °F (36.7 °C)       Intake/Output:   Last shift:      No intake/output data recorded. Last 3 shifts: No intake/output data recorded. No intake or output data in the 24 hours ending 19 1056    Physical Exam:     General:  Alert, Awake, NAD, cooperative, no distress, appears stated age. Head:   Normocephalic, without obvious abnormality, atraumatic. Eye:   Conjunctivae/corneas clear. PERRLA, no scleral icterus, no pallor, no cyanosis  Nose:   Nares normal. Septum midline. Mucosa normal without erythema/exudate. No drainage/discharge. No sinus tenderness. Throat:  Lips, mucosa, and tongue normal. Teeth and gums normal. No tonsillar enlargement, no erythema, no exudates, no oral thrush  Neck:   Supple, symmetric, thyroid: no enlargement/tenderness/nodule, no JVD, no carotid bruit, no lymphadenopathy. Trachea midline  Back & spine: Symmetric, no curvature. Chest wall: No tenderness or deformity. No rash  Lung:   Breath sounds with good movement heard throughout without wheezes  Heart:   Regular rate & rhythm. S1 S2 present, no murmur, no gallop, no click, no rub  Abdomen:  Soft, NT, ND, +BS, no masses, no organomegaly  Extremities:  No pedal edema, no cyanosis, no clubbing  Pulses: 2+ and symmetric in DP  Lymphatic:  No cervical, supraclavicular and axillary palpable lymphadenopathy.    Musculoskeletal: No joint swelling or tenderness. Neurologic:  Grossly non focal.        Data:         Chemistry No results for input(s): GLU, NA, K, CL, CO2, BUN, CREA, CA, MG, PHOS, AGAP, BUCR, TBIL, GPT, AP, TP, ALB, GLOB, AGRAT in the last 72 hours. Lactic Acid Lactic acid   Date Value Ref Range Status   01/13/2017 1.4 0.4 - 2.0 MMOL/L Final     No results for input(s): LAC in the last 72 hours. Liver Enzymes Protein, total   Date Value Ref Range Status   03/08/2019 6.9 6.4 - 8.2 g/dL Final     Albumin   Date Value Ref Range Status   03/08/2019 3.7 3.4 - 5.0 g/dL Final     Globulin   Date Value Ref Range Status   03/08/2019 3.2 2.0 - 4.0 g/dL Final     A-G Ratio   Date Value Ref Range Status   03/08/2019 1.2 0.8 - 1.7   Final     AST (SGOT)   Date Value Ref Range Status   03/08/2019 14 (L) 15 - 37 U/L Final     Alk. phosphatase   Date Value Ref Range Status   03/08/2019 116 45 - 117 U/L Final     No results for input(s): TP, ALB, GLOB, AGRAT, SGOT, GPT, AP, TBIL in the last 72 hours. No lab exists for component: DBIL     CBC w/Diff No results for input(s): WBC, RBC, HGB, HCT, PLT, GRANS, LYMPH, EOS, HGBEXT, HCTEXT, PLTEXT, HGBEXT, HCTEXT, PLTEXT in the last 72 hours. Cardiac Enzymes No results found for: CPK, CK, CKMMB, CKMB, RCK3, CKMBT, CKNDX, CKND1, JOSE, TROPT, TROIQ, SURY, TROPT, TNIPOC, BNP, BNPP     BNP Lab Results   Component Value Date/Time    BNP 19.9 12/07/2015 05:23 PM    BNP 3.4 11/01/2015 03:20 PM        Coagulation Recent Labs     03/12/19  0328   APTT 23.0         Thyroid  Lab Results   Component Value Date/Time    TSH 0.02 (L) 03/12/2019 03:28 AM          Lipid Panel No results found for: CHOL, CHOLPOCT, CHOLX, CHLST, CHOLV, 119914, HDL, LDL, LDLC, DLDLP, 419607, VLDLC, VLDL, TGLX, TRIGL, TRIGP, TGLPOCT, CHHD, CHHDX     ABG No results for input(s): PHI, PHI, POC2, PCO2I, PO2, PO2I, HCO3, HCO3I, FIO2, FIO2I in the last 72 hours.      Urinalysis Lab Results   Component Value Date/Time    Color YELLOW 04/18/2017 02:30 PM    Appearance CLEAR 04/18/2017 02:30 PM    Specific gravity 1.016 04/18/2017 02:30 PM    pH (UA) 5.5 04/18/2017 02:30 PM    Protein NEGATIVE  04/18/2017 02:30 PM    Glucose NEGATIVE  04/18/2017 02:30 PM    Ketone 40 (A) 04/18/2017 02:30 PM    Bilirubin NEGATIVE  04/18/2017 02:30 PM    Urobilinogen 1.0 04/18/2017 02:30 PM    Nitrites NEGATIVE  04/18/2017 02:30 PM    Leukocyte Esterase NEGATIVE  04/18/2017 02:30 PM    Epithelial cells FEW 01/11/2017 06:24 PM    Bacteria FEW (A) 01/11/2017 06:24 PM    WBC NONE 01/11/2017 06:24 PM    RBC NONE 01/11/2017 06:24 PM        Micro  No results for input(s): SDES, CULT in the last 72 hours. No results for input(s): CULT in the last 72 hours. XR (Most Recent). CXR reviewed by me and compared with previous CXR Results from Hospital Encounter encounter on 03/08/19   XR CHEST PORT    Narrative EXAMINATION: Chest single view    INDICATION: COPD exacerbation    COMPARISON: 2/27/2018    FINDINGS: Single frontal view of the chest obtained. Underpenetration limits  evaluation. Borderline prominent cardiac silhouette. Prominent perihilar  interstitium. Minimal aortic arch calcifications. No evidence of pneumothorax. No acute osseous findings. Impression IMPRESSION:    Borderline prominent cardiac silhouette with suspected mild interstitial  infiltrate/edema. CT (Most Recent) Results from Hospital Encounter encounter on 12/19/17   CT CHEST W CONT    Narrative CT CHEST WITH ENHANCEMENT    INDICATION: History of COPD, asthma, congestive heart failure with one week of  constant chest tightness, recent hospitalization for pneumonia completed  antibiotics and steroids without improvement. TECHNIQUE: Axial images obtained from the thoracic inlet to the level of the  diaphragm following uneventful administration of 100 cc Isovue-300 nonionic  intravenous contrast. Coronal and sagittal reformatted images were obtained.     All CT scans at this facility are performed using dose optimization technique as  appropriate to a performed exam, to include automated exposure control,  adjustment of the mA and/or kV according to patient size (including appropriate  matching first site-specific examinations), or use of iterative reconstruction  technique. COMPARISON: CT chest 1/26/2017. CHEST FINDINGS:     Thyroid: Similar right thyroid enlargement with likely multifocal nodules  measuring up to 2 cm. Pericardium/ Heart: Heart size is normal. No pericardial effusion. No definite  calcified coronary arteriosclerosis. Aorta/ Vessels: No aneurysm or dissection. Lymph Nodes: Unremarkable. .    Lungs: Trachea and central bronchial tree are patent. Similar mild subsegmental  atelectasis or scarring in the left lower lobe. No suspicious pulmonary nodule,  consolidation or mass. Pleura: No effusion. Upper Abdomen: Unchanged hepatic hypodensities, the largest is 1.6 cm with cyst  density. Gallbladder is decompressed. 1.4 cm left upper pole renal cyst.    Bones/soft tissues: Unremarkable. Impression IMPRESSION:    No acute findings to explain patient's symptoms. Right thyroid enlargement and multinodular goiter, previously and better  evaluated with ultrasound. EKG No results found for this or any previous visit. ECHO No results found for this or any previous visit. PFT No flowsheet data found. Other ASA reactivity:   Pre-albumin:   Ionized Calcium:   NH4:   T3, FT4:  Cortisol:  Urine Osm:  Urine Lytes:   HbA1c:      No results found for this or any previous visit (from the past 24 hour(s)).       High complexity decision making was performed during the evaluation of this patient at high risk for decompensation with multiple organ involvement     Above mentioned total time spent on reviewing the case/medical record/data/notes/EMR/patient examination/documentation/coordinating care with nurse/consultants, exclusive of procedures with complex decision making performed and > 50% time spent in face to face evaluation.     Roel Mckinney MD  3/14/2019
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
## 2019-03-14 NOTE — PROGRESS NOTES
Problem: Falls - Risk of  Goal: *Absence of Falls  Document Delroy Fall Risk and appropriate interventions in the flowsheet. Outcome: Progressing Towards Goal  Fall Risk Interventions:            Medication Interventions: Patient to call before getting OOB         History of Falls Interventions: Door open when patient unattended        Problem: Pressure Injury - Risk of  Goal: *Prevention of pressure injury  Document Darrell Scale and appropriate interventions in the flowsheet. Outcome: Progressing Towards Goal  Pressure Injury Interventions:  Sensory Interventions: Assess changes in LOC    Moisture Interventions: Minimize layers    Activity Interventions: PT/OT evaluation    Mobility Interventions: Turn and reposition approx.  every two hours(pillow and wedges)    Nutrition Interventions: Offer support with meals,snacks and hydration    Friction and Shear Interventions: Transferring/repositioning devices

## 2019-03-14 NOTE — PROGRESS NOTES
Received report from Sil Cuenca. Pt was in pain, medication was given as prescribed. Does not want to take periwick off at the time. No other issues on this shift. Bedside and Verbal shift change report given to Yulissa Valdez (oncoming nurse) by Halie Tamez (offgoing nurse).  Report included the following information SBAR, Kardex, MAR, Recent Results and Cardiac Rhythm SR.

## 2019-03-14 NOTE — PROGRESS NOTES
Problem: Falls - Risk of  Goal: *Absence of Falls  Document Delroy Fall Risk and appropriate interventions in the flowsheet. Outcome: Progressing Towards Goal  Fall Risk Interventions:            Medication Interventions: Evaluate medications/consider consulting pharmacy, Patient to call before getting OOB, Teach patient to arise slowly         History of Falls Interventions: Door open when patient unattended        Problem: Pressure Injury - Risk of  Goal: *Prevention of pressure injury  Document Darrell Scale and appropriate interventions in the flowsheet.   Outcome: Progressing Towards Goal  Pressure Injury Interventions:  Sensory Interventions: Keep linens dry and wrinkle-free, Minimize linen layers, Pressure redistribution bed/mattress (bed type), Check visual cues for pain    Moisture Interventions: Internal/External urinary devices    Activity Interventions: Pressure redistribution bed/mattress(bed type), PT/OT evaluation, Increase time out of bed    Mobility Interventions: Pressure redistribution bed/mattress (bed type)    Nutrition Interventions: Offer support with meals,snacks and hydration, Document food/fluid/supplement intake    Friction and Shear Interventions: HOB 30 degrees or less, Minimize layers

## 2019-03-20 NOTE — DISCHARGE SUMMARY
[address] [place] [address]    Name:  Obi Calvillo  MR#:   [id number]  :  1965  ACCOUNT #:  [de-identified]  ADMIT DATE:  2019  DISCHARGE DATE:  2019      DISCHARGE DIAGNOSES:  1. Acute chronic obstructive pulmonary disease/asthma overlap exacerbation. 2.  Obstructive sleep apnea. 3.  Obesity. 4.  Hypertension. 5.  Tobacco abuse. 6.  Chest pain, a stress test without significant ischemia. 7.  Acute hypoxic respiratory failure in the earlier part of the admission. HOSPITAL COURSE:  This is a 78-year-old female, presented to the ER with some chest pain and shortness of breath. The patient was evaluated. The patient has known prior history of asthma and there is concern about overlap of COPD. The patient was admitted for exacerbation. The patient was started on oxygen, steroids, and bronchodilators. Pulmonary was consulted. Cardiology was also consulted. The patient had some elevated troponins. The patient slowly showed improvement. The patient had some periods where she had some agitated behavior. Psychiatry was consulted. The patient was started on Seroquel. The patient subsequently underwent a stress test, which did not show any ischemia as per Cardiology. The patient was mobilized. Pulmonary cleared the patient for discharge. On the day of discharge, the patient was in good spirits and wanted to get discharged from the hospital.  The patient denied any suicidal or homicidal ideation. We discussed with the . Arrangements were made and the patient was discharged. DISPOSITION:  Home with home health care. CONSULTATIONS:  With Pulmonary, Cardiology, and Psychiatry. PROCEDURES:  The patient underwent a stress test, which did not show any significant ischemia.   The patient was hemodynamically stable at the time of discharge, the patient was advised to follow up with her PCP in 5 days, Pulmonary in 10 days, with Cardiology in 2 weeks and with Psychiatry in 2 weeks. The patient was counseled to abstain from any smoking. DISCHARGE MEDICATIONS:  Included:  1. Albuterol 2 puffs inhaled every 4 hours as needed. 2.  Albuterol nebulizer treatment every 4 hours a needed. 3.  Norvasc 10 mg p.o. daily. 4.  Brovana 2 mL nebulized twice daily. 5.  Pulmicort 2 mL nebulized twice daily. 6.  Percocet 10/325 1 tablet p.o. every 8 hours as needed for pain. 7.  Spiriva 1 capsule inhale daily. 8.  Aspirin 81 mg p.o. daily. 9.  Lipitor 20 mg p.o. daily at night. 10.  Clonidine 0.2 mg p.o. twice daily. 11.  Colace 100 mg p.o. twice daily. 12.  Lexapro 10 mg p.o. daily. 13.  Lopressor 25 mg tablet, take half tablet p.o. every 12 hours. 14.  Prednisone taper as directed. 15.  Seroquel 50 mg p.o. daily at night. 16.  Daliresp 500 mcg tablet take one tablet p.o. daily. 17.  Check a CBC, CMP and magnesium in 4 days. Results to PCP immediately. 18.  Incentive spirometry, use as directed. 19.  Narcan as needed and as directed. 20.  Symbicort 160/4.5 take 2 puffs inhale twice daily. 21.  Xanax 1 mg p.o. twice daily. 22.  Pepcid 20 mg p.o. daily. 23.  Singulair 10 mg p.o. daily at night. Discharge plans were discussed with the patient. I also discussed with the . Total time for the discharge was more than 35 minutes.         Shante Nunn MD      [id number]  D:  03/19/2019 14:47  T:  03/20/2019 3:58  JOB #:  [id number]

## 2019-04-14 ENCOUNTER — APPOINTMENT (OUTPATIENT)
Dept: GENERAL RADIOLOGY | Age: 54
End: 2019-04-14
Attending: EMERGENCY MEDICINE
Payer: MEDICAID

## 2019-04-14 ENCOUNTER — HOSPITAL ENCOUNTER (EMERGENCY)
Age: 54
Discharge: HOME | End: 2019-04-15
Attending: EMERGENCY MEDICINE
Payer: MEDICAID

## 2019-04-14 ENCOUNTER — HOSPITAL ENCOUNTER (EMERGENCY)
Age: 54
Discharge: LEFT BEFORE DISCHARGE | End: 2019-04-14
Attending: EMERGENCY MEDICINE
Payer: MEDICAID

## 2019-04-14 VITALS
SYSTOLIC BLOOD PRESSURE: 212 MMHG | RESPIRATION RATE: 17 BRPM | HEIGHT: 59 IN | BODY MASS INDEX: 40.92 KG/M2 | HEART RATE: 97 BPM | WEIGHT: 203 LBS | DIASTOLIC BLOOD PRESSURE: 86 MMHG | TEMPERATURE: 99.8 F | OXYGEN SATURATION: 99 %

## 2019-04-14 VITALS
RESPIRATION RATE: 16 BRPM | HEART RATE: 86 BPM | BODY MASS INDEX: 40.92 KG/M2 | TEMPERATURE: 98.7 F | OXYGEN SATURATION: 93 % | HEIGHT: 59 IN | DIASTOLIC BLOOD PRESSURE: 101 MMHG | SYSTOLIC BLOOD PRESSURE: 200 MMHG | WEIGHT: 203 LBS

## 2019-04-14 DIAGNOSIS — Z53.29 LEFT AGAINST MEDICAL ADVICE: ICD-10-CM

## 2019-04-14 DIAGNOSIS — I16.0 HYPERTENSIVE URGENCY: Primary | ICD-10-CM

## 2019-04-14 DIAGNOSIS — R06.03 RESPIRATORY DISTRESS: ICD-10-CM

## 2019-04-14 DIAGNOSIS — I50.30 DIASTOLIC CONGESTIVE HEART FAILURE, UNSPECIFIED HF CHRONICITY (HCC): ICD-10-CM

## 2019-04-14 DIAGNOSIS — R06.03 ACUTE RESPIRATORY DISTRESS: Primary | ICD-10-CM

## 2019-04-14 LAB
ALBUMIN SERPL-MCNC: 3.8 G/DL (ref 3.4–5)
ALBUMIN/GLOB SERPL: 1.2 {RATIO} (ref 0.8–1.7)
ALP SERPL-CCNC: 103 U/L (ref 45–117)
ALT SERPL-CCNC: 21 U/L (ref 13–56)
ANION GAP SERPL CALC-SCNC: 7 MMOL/L (ref 3–18)
ARTERIAL PATENCY WRIST A: ABNORMAL
AST SERPL-CCNC: 11 U/L (ref 15–37)
BASE EXCESS BLDA CALC-SCNC: 1 MMOL/L
BASOPHILS # BLD: 0 K/UL (ref 0–0.1)
BASOPHILS NFR BLD: 0 % (ref 0–2)
BDY SITE: ABNORMAL
BILIRUB SERPL-MCNC: 0.3 MG/DL (ref 0.2–1)
BNP SERPL-MCNC: 272 PG/ML (ref 0–900)
BUN SERPL-MCNC: 15 MG/DL (ref 7–18)
BUN/CREAT SERPL: 15 (ref 12–20)
CALCIUM SERPL-MCNC: 8.8 MG/DL (ref 8.5–10.1)
CHLORIDE SERPL-SCNC: 105 MMOL/L (ref 100–108)
CO2 SERPL-SCNC: 29 MMOL/L (ref 21–32)
CREAT SERPL-MCNC: 0.99 MG/DL (ref 0.6–1.3)
DIFFERENTIAL METHOD BLD: NORMAL
EOSINOPHIL # BLD: 0.1 K/UL (ref 0–0.4)
EOSINOPHIL NFR BLD: 1 % (ref 0–5)
ERYTHROCYTE [DISTWIDTH] IN BLOOD BY AUTOMATED COUNT: 13.5 % (ref 11.6–14.5)
GAS FLOW.O2 O2 DELIVERY SYS: 2 L/MIN
GLOBULIN SER CALC-MCNC: 3.3 G/DL (ref 2–4)
GLUCOSE SERPL-MCNC: 95 MG/DL (ref 74–99)
HCO3 BLDA-SCNC: 30 MMOL/L (ref 22–26)
HCT VFR BLD AUTO: 41.8 % (ref 35–45)
HGB BLD-MCNC: 13.4 G/DL (ref 12–16)
LYMPHOCYTES # BLD: 1.8 K/UL (ref 0.9–3.6)
LYMPHOCYTES NFR BLD: 27 % (ref 21–52)
MCH RBC QN AUTO: 28.9 PG (ref 24–34)
MCHC RBC AUTO-ENTMCNC: 32.1 G/DL (ref 31–37)
MCV RBC AUTO: 90.3 FL (ref 74–97)
MONOCYTES # BLD: 0.4 K/UL (ref 0.05–1.2)
MONOCYTES NFR BLD: 5 % (ref 3–10)
NEUTS SEG # BLD: 4.4 K/UL (ref 1.8–8)
NEUTS SEG NFR BLD: 67 % (ref 40–73)
PCO2 BLDA: 70 MMHG (ref 35–45)
PH BLDA: 7.26 [PH] (ref 7.35–7.45)
PLATELET # BLD AUTO: 202 K/UL (ref 135–420)
PMV BLD AUTO: 10.4 FL (ref 9.2–11.8)
PO2 BLDA: 59 MMHG (ref 80–100)
POTASSIUM SERPL-SCNC: 3.8 MMOL/L (ref 3.5–5.5)
PROT SERPL-MCNC: 7.1 G/DL (ref 6.4–8.2)
RBC # BLD AUTO: 4.63 M/UL (ref 4.2–5.3)
SAO2 % BLD: 85 % (ref 92–97)
SAO2% DEVICE SAO2% SENSOR NAME: ABNORMAL
SODIUM SERPL-SCNC: 141 MMOL/L (ref 136–145)
SPECIMEN SITE: ABNORMAL
TROPONIN I SERPL-MCNC: <0.02 NG/ML (ref 0–0.04)
WBC # BLD AUTO: 6.6 K/UL (ref 4.6–13.2)

## 2019-04-14 PROCEDURE — 71045 X-RAY EXAM CHEST 1 VIEW: CPT

## 2019-04-14 PROCEDURE — 93005 ELECTROCARDIOGRAM TRACING: CPT

## 2019-04-14 PROCEDURE — 77030029684 HC NEB SM VOL KT MONA -A

## 2019-04-14 PROCEDURE — 84484 ASSAY OF TROPONIN QUANT: CPT

## 2019-04-14 PROCEDURE — 80053 COMPREHEN METABOLIC PANEL: CPT

## 2019-04-14 PROCEDURE — 82803 BLOOD GASES ANY COMBINATION: CPT

## 2019-04-14 PROCEDURE — 94640 AIRWAY INHALATION TREATMENT: CPT

## 2019-04-14 PROCEDURE — 96374 THER/PROPH/DIAG INJ IV PUSH: CPT

## 2019-04-14 PROCEDURE — 36600 WITHDRAWAL OF ARTERIAL BLOOD: CPT

## 2019-04-14 PROCEDURE — 85025 COMPLETE CBC W/AUTO DIFF WBC: CPT

## 2019-04-14 PROCEDURE — 99284 EMERGENCY DEPT VISIT MOD MDM: CPT

## 2019-04-14 PROCEDURE — 74011250637 HC RX REV CODE- 250/637: Performed by: EMERGENCY MEDICINE

## 2019-04-14 PROCEDURE — 99285 EMERGENCY DEPT VISIT HI MDM: CPT

## 2019-04-14 PROCEDURE — 74011250636 HC RX REV CODE- 250/636: Performed by: EMERGENCY MEDICINE

## 2019-04-14 PROCEDURE — 74011000250 HC RX REV CODE- 250: Performed by: EMERGENCY MEDICINE

## 2019-04-14 PROCEDURE — 83880 ASSAY OF NATRIURETIC PEPTIDE: CPT

## 2019-04-14 RX ORDER — SODIUM CHLORIDE 0.9 % (FLUSH) 0.9 %
5-40 SYRINGE (ML) INJECTION AS NEEDED
Status: DISCONTINUED | OUTPATIENT
Start: 2019-04-14 | End: 2019-04-14 | Stop reason: HOSPADM

## 2019-04-14 RX ORDER — MAGNESIUM SULFATE HEPTAHYDRATE 40 MG/ML
2 INJECTION, SOLUTION INTRAVENOUS ONCE
Status: DISCONTINUED | OUTPATIENT
Start: 2019-04-14 | End: 2019-04-14 | Stop reason: HOSPADM

## 2019-04-14 RX ORDER — FUROSEMIDE 10 MG/ML
40 INJECTION INTRAMUSCULAR; INTRAVENOUS
Status: DISCONTINUED | OUTPATIENT
Start: 2019-04-14 | End: 2019-04-14 | Stop reason: HOSPADM

## 2019-04-14 RX ORDER — IPRATROPIUM BROMIDE AND ALBUTEROL SULFATE 2.5; .5 MG/3ML; MG/3ML
3 SOLUTION RESPIRATORY (INHALATION) ONCE
Status: COMPLETED | OUTPATIENT
Start: 2019-04-14 | End: 2019-04-14

## 2019-04-14 RX ORDER — SODIUM CHLORIDE 0.9 % (FLUSH) 0.9 %
5-40 SYRINGE (ML) INJECTION EVERY 8 HOURS
Status: DISCONTINUED | OUTPATIENT
Start: 2019-04-14 | End: 2019-04-14 | Stop reason: HOSPADM

## 2019-04-14 RX ORDER — IPRATROPIUM BROMIDE AND ALBUTEROL SULFATE 2.5; .5 MG/3ML; MG/3ML
3 SOLUTION RESPIRATORY (INHALATION)
Status: COMPLETED | OUTPATIENT
Start: 2019-04-14 | End: 2019-04-14

## 2019-04-14 RX ADMIN — IPRATROPIUM BROMIDE AND ALBUTEROL SULFATE 3 ML: .5; 3 SOLUTION RESPIRATORY (INHALATION) at 15:16

## 2019-04-14 RX ADMIN — NITROGLYCERIN 1 INCH: 20 OINTMENT TOPICAL at 16:04

## 2019-04-14 RX ADMIN — IPRATROPIUM BROMIDE AND ALBUTEROL SULFATE 3 ML: .5; 3 SOLUTION RESPIRATORY (INHALATION) at 20:05

## 2019-04-14 RX ADMIN — METHYLPREDNISOLONE SODIUM SUCCINATE 125 MG: 125 INJECTION, POWDER, FOR SOLUTION INTRAMUSCULAR; INTRAVENOUS at 23:20

## 2019-04-14 NOTE — ED NOTES
Patient's arrived from EMS with nebulizer. Patient sats drop to 88-89% on RA before being placed on 3L NC. Patient nebulizer restarted and sats improved to 100% again.

## 2019-04-14 NOTE — ED NOTES
Patient up to bathroom. Patient refused to take urine cup to bathroom for urine sample. Patient continues to express interest in leaving AMA. Provider aware.

## 2019-04-14 NOTE — ED NOTES
Provider at bedside discussing plan of care, x-ray and blood gas results, discussing potential for patient to be admitted. Prior to arrival of MD patient states to daughter \"I'm feeling better. Are you ready to go? \"  To which the daughter responds \"I'm ready when you're ready. \" After MD left the room patient speaking to daughter about her plans. Patient discussing with daughter her desire to leave [place] and go to another hospital closer to her home near [facility]. Patient allowing for attempt at PIV and blood draw, one attempt unsuccessful. Patient refusing to allow primary nurse (myself) to stick her again. Patient refusing flu swab, stating \"I don't want that, I had that done recently. \"

## 2019-04-14 NOTE — ED NOTES
Patient called out on call bell complaining of pain. Charge nurse to bedside, patient asking for pain meds.

## 2019-04-14 NOTE — ED PROVIDER NOTES
EMERGENCY DEPARTMENT HISTORY AND PHYSICAL EXAM 
 
 
Date: 4/14/2019 Patient Name: Kya Silva History of Presenting Illness Chief Complaint Patient presents with  Shortness of Breath  
  ran out of meds 2 days ago copd History Provided By: Patient and Patient's Daughter HPI: Kya Silva, 46 y.o. female with PMHx significant for CHF, COPD on 3 L of oxygen, hypertension, tobacco abuse, opiate use to the ER with progressive shortness of breath and chest tightness since visiting her daughter here in [place]. She states she was admitted to [facility] in [place] in March for COPD exacerbation. She states she is out of her medications as she may be using them too often. She denies any illicit drug use. On arrival she is in mild respiratory distress but seems very sleepy. There are no other complaints, changes, or physical findings at this time. PCP: None No current facility-administered medications on file prior to encounter. Current Outpatient Medications on File Prior to Encounter Medication Sig Dispense Refill  albuterol (PROAIR HFA) 90 mcg/actuation inhaler Take 2 Puffs by inhalation every four (4) hours as needed for Wheezing. 1 Inhaler 0  
 albuterol (PROVENTIL VENTOLIN) 2.5 mg /3 mL (0.083 %) nebulizer solution 3 mL by Nebulization route every four (4) hours as needed for Wheezing. 60 Each 0  
 amLODIPine (NORVASC) 10 mg tablet Take 1 Tab by mouth daily. 30 Tab 0  
 arformoterol (BROVANA) 15 mcg/2 mL nebu neb solution 2 mL by Nebulization route two (2) times a day. 60 Vial 0  
 budesonide (PULMICORT) 0.5 mg/2 mL nbsp 2 mL by Nebulization route two (2) times a day. 60 Each 0  
 tiotropium (SPIRIVA) 18 mcg inhalation capsule Take 1 Cap by inhalation daily. 30 Cap 0  
 aspirin 81 mg chewable tablet Take 1 Tab by mouth daily. 30 Tab 0  
 atorvastatin (LIPITOR) 20 mg tablet Take 1 Tab by mouth nightly.  30 Tab 0  
  cloNIDine HCl (CATAPRES) 0.2 mg tablet Take 1 Tab by mouth two (2) times a day. 60 Tab 0  
 docusate sodium (COLACE) 100 mg capsule Take 1 Cap by mouth two (2) times a day. 60 Cap 0  
 escitalopram oxalate (LEXAPRO) 10 mg tablet Take 1 Tab by mouth every evening. 30 Tab 0  
 metoprolol tartrate (LOPRESSOR) 25 mg tablet Take 0.5 Tabs by mouth every twelve (12) hours. 30 Tab 0  predniSONE (DELTASONE) 10 mg tablet 4 tabs ( 40 mg ) po daily for 3 days, then 2 tabs ( 20 mg ) po daily for 3 days, then 1 tab ( 10 mg ) po daily for 3 days, then stop 21 Tab 0  
 QUEtiapine (SEROQUEL) 50 mg tablet Take 1 Tab by mouth nightly. 30 Tab 0  
 roflumilast (DALIRESP) 500 mcg tab tablet Take 1 Tab by mouth daily. 30 Tab 0  
 OTHER Check CBC, CMP, Mg in 4 days, results to PCP immediately, Diagnosis- asthma 1 Each 0  
 OTHER Incentive spirometry- use as directed 1 Each 0  
 naloxone (NARCAN) 4 mg/actuation nasal spray Use 1 spray intranasally, then discard. Repeat with new spray every 2 min as needed for opioid overdose symptoms, alternating nostrils. 1 Each 0  
 budesonide-formoterol (SYMBICORT) 160-4.5 mcg/actuation HFAA Take 2 Puffs by inhalation two (2) times a day. 1 Inhaler 1  
 ALPRAZolam (XANAX) 1 mg tablet Take 1 Tab by mouth two (2) times a day. Max Daily Amount: 2 mg. 20 Tab 0  
 famotidine (PEPCID) 20 mg tablet Take 1 Tab by mouth daily. 30 Tab 1  
 montelukast (SINGULAIR) 10 mg tablet Take 1 Tab by mouth nightly. 30 Tab 1 Past History Past Medical History: 
Past Medical History:  
Diagnosis Date  Asthma  CHF (congestive heart failure) ([facility] [address]  Chronic obstructive pulmonary disease ([facility] [address]  Endocrine disease   
 thyroid issues  Gastrointestinal disorder \"blockage in my stomach\"  Hypertension Past Surgical History: 
History reviewed. No pertinent surgical history. Family History: 
History reviewed. No pertinent family history. Social History: 
Social History Tobacco Use  Smoking status: Current Every Day Smoker Packs/day: 0.50  Smokeless tobacco: Former User Substance Use Topics  Alcohol use: No  
  Comment: socially  Drug use: No  
 
 
Allergies: 
No Known Allergies Review of Systems Review of Systems Constitutional: Positive for fatigue. Negative for chills and fever. HENT: Negative. Negative for congestion and rhinorrhea. Respiratory: Positive for chest tightness and shortness of breath. Negative for cough and wheezing. Cardiovascular: Negative. Negative for chest pain, palpitations and leg swelling. Gastrointestinal: Negative. Negative for abdominal pain, constipation, nausea and vomiting. Endocrine: Negative. Genitourinary: Negative. Negative for decreased urine volume, flank pain, hematuria and pelvic pain. Musculoskeletal: Negative. Negative for back pain and neck pain. Skin: Negative. Negative for color change, pallor and rash. Neurological: Negative. Negative for dizziness, seizures, weakness, numbness and headaches. Hematological: Negative. Negative for adenopathy. Psychiatric/Behavioral: Negative. All other systems reviewed and are negative. Physical Exam  
Physical Exam  
Constitutional: She is oriented to person, place, and time. She appears well-developed and well-nourished. No distress. HENT:  
Head: Normocephalic and atraumatic. Mouth/Throat: No oropharyngeal exudate. Eyes: Pupils are equal, round, and reactive to light. Conjunctivae are normal. Right eye exhibits no discharge. Left eye exhibits no discharge. No scleral icterus. Neck: Normal range of motion. Neck supple. No JVD present. Cardiovascular: Normal rate, regular rhythm, normal heart sounds and intact distal pulses. Exam reveals no gallop and no friction rub. No murmur heard. Pulmonary/Chest: Effort normal. No stridor. No respiratory distress. She has wheezes. She has no rales. She exhibits no tenderness. Abdominal: Soft. Bowel sounds are normal. She exhibits no distension and no mass. There is no tenderness. There is no rebound and no guarding. Musculoskeletal: She exhibits edema. Neurological: She is alert and oriented to person, place, and time. She displays normal reflexes. No cranial nerve deficit. She exhibits normal muscle tone. Coordination normal.  
Skin: Skin is warm. No rash noted. She is not diaphoretic. No pallor. Vitals reviewed. 4:06 PM review of the records from recent admission on March 9, 2019 patient had a normal cardiac stress test and echocardiogram showing an EF of 70%. 
 
5:06 PM-patient states she does not want to stay here. I discussed her lab results EKG findings that we had at the time. All of her lab results were not back yet. Her blood gases concerning for acute on chronic respiratory acidosis. Her x-ray showed mild congestive heart failure. I recommended admission to the hospital.  Her daughter was present at the bedside. The patient was made aware of the risk of leaving early which includes respiratory failure, low blood pressure, worsening heart failure and even death. The patient states she would prefer to go back home in [place] and possibly present to the hospital where her primary care physician is located. Diagnostic Study Results Labs - Recent Results (from the past 12 hour(s)) BLOOD GAS, ARTERIAL Collection Time: 04/14/19  3:15 PM  
Result Value Ref Range pH 7.26 (L) 7.35 - 7.45    
 PCO2 70 (H) 35.0 - 45.0 mmHg PO2 59 (L) 80 - 100 mmHg O2 SAT 85 (L) 92 - 97 % BICARBONATE 30 (H) 22 - 26 mmol/L  
 BASE EXCESS 1.0 mmol/L  
 O2 METHOD NASAL O2    
 O2 FLOW RATE 2.00 L/min Sample source ARTERIAL    
 SITE LEFT BRACHIAL ARTIE'S TEST N/A    
EKG, 12 LEAD, INITIAL Collection Time: 04/14/19  3:30 PM  
Result Value Ref Range Ventricular Rate 90 BPM  
 Atrial Rate 90 BPM  
 P-R Interval 128 ms  QRS Duration 86 ms  
 Q-T Interval 358 ms QTC Calculation (Bezet) 437 ms Calculated P Axis 70 degrees Calculated R Axis 34 degrees Calculated T Axis 44 degrees Diagnosis Normal sinus rhythm with sinus arrhythmia Possible Left atrial enlargement Possible Anterior infarct , age undetermined Abnormal ECG No previous ECGs available Radiologic Studies -  
XR CHEST PORT Final Result IMPRESSION:   
  
Mild CHF. CT Results  (Last 48 hours) None CXR Results  (Last 48 hours) 04/14/19 1522  XR CHEST PORT Final result Impression:  IMPRESSION:   
   
Mild CHF. Narrative:  EXAM:  XR CHEST PORT INDICATION:  SOB COPD, patient ran out of medications 2 days ago COMPARISON:  None. FINDINGS:  
   
A portable AP radiograph of the chest was obtained at 15:11 hours. The patient  
is on a cardiac monitor. There is mild vascular congestion. No focal airspace  
disease or pleural fluid are demonstrated. There is mild enlargement of the  
cardiac silhouette. The bones and soft tissues are unremarkable. Medical Decision Making I am the first provider for this patient. I reviewed the vital signs, available nursing notes, past medical history, past surgical history, family history and social history. Vital Signs-Reviewed the patient's vital signs. Patient Vitals for the past 12 hrs: 
 Temp Pulse Resp BP SpO2  
04/14/19 1600  97 15 170/54 92 % 04/14/19 1531  93 12 151/50 95 % 04/14/19 1516     94 % 04/14/19 1438  99 13 (!) 190/126 99 % 04/14/19 1430  92 15 (!) 206/91 96 % 04/14/19 1411    (!) 216/88 98 % 04/14/19 1405 98.7 °F (37.1 °C) (!) 101 19 (!) 216/88 100 % Pulse Oximetry Analysis - 94% on 3liters Cardiac Monitor:  
Rate: 90 bpm 
Rhythm: Normal Sinus Rhythm EKG interpretation: (Preliminary) Rhythm: normal sinus rhythm; and regular .  Rate (approx.): 90; Axis: normal; CO interval: normal; QRS interval: normal ; ST/T wave: Q waves anterior; Other findings: abnormal ekg. Records Reviewed: Nursing Notes, Old Medical Records and Previous Radiology Studies Provider Notes (Medical Decision Making):  
Differential diagnosis-COPD exacerbation, CHF, hypertensive urgency, drug overdose, pneumonia, PE, coronary syndrome, respiratory failure Impression/plan-46year-old with complex pulmonary disease including COPD on 3 L of oxygen. She is in the ER complaining of chest tightness and shortness of breath of the last 2 days. She states she is visiting her daughter and is out of her medications. In the ER she seems very sleepy so question whether she is taken too much medication versus CO2 retention. Plan will be to begin pulmonary toilet as well as start on nitroglycerin as she is very hypertensive. Patient will possibly need admission if she does not respond to treatment ED Course:  
Initial assessment performed. The patients presenting problems have been discussed, and they are in agreement with the care plan formulated and outlined with them. I have encouraged them to ask questions as they arise throughout their visit. Critical Care Time:  
0 Disposition: 
Left against medical advice PLAN: 
1. Current Discharge Medication List  
  
 
2. Follow-up Information None Return to ED if worse Diagnosis Clinical Impression: 1. Hypertensive urgency 2. Diastolic congestive heart failure, unspecified HF chronicity ([address] 3. Respiratory distress 4. Left against medical advice Attestations:

## 2019-04-14 NOTE — ED NOTES
Patient spoke to ANTIONETTE abarca earlier. Patient continues to inquire about pain medications. Patient states \"I take percocet tens at home, two of them. But that was a while ago, I'm hurting right now. Did the doctor say anything about my pain meds? \"  Discussed plan of care with patient. Patient with low interest in blood work and tests and other medications.

## 2019-04-14 NOTE — ED NOTES
Patient leaving AMA at this time. Patient spoke to provider, patient insistent on not staying. Patient signed AMA form. Patient refused wheelchair, left at this time.

## 2019-04-14 NOTE — ED NOTES
Patient arrival via EMS with c/o SOB x2 days, worsening today. EMS reports patient with says 83-85% on RA at arrival to patient. Patient in the area visiting from [facility] area, states she had not had her COPD meds in 2 days. EMS states sats improved to 100% after starting nebulizer treatment. Patient's other vital signs stable per EMS except for increased blood pressure. Patient alert and oriented on arrival with nebulizer from EMS currently running. Emergency Department Nursing Plan of Care The Nursing Plan of Care is developed from the Nursing assessment and Emergency Department Attending provider initial evaluation. The plan of care may be reviewed in the ED Provider note. The Plan of Care was developed with the following considerations:  
Patient / Family readiness to learn indicated by:verbalized understanding Persons(s) to be included in education: patient Barriers to Learning/Limitations:No 
 
Signed 707 MONTSE Huggins RN   
4/14/2019   6:37 PM

## 2019-04-15 LAB
ATRIAL RATE: 90 BPM
ATRIAL RATE: 94 BPM
CALCULATED P AXIS, ECG09: 70 DEGREES
CALCULATED P AXIS, ECG09: 73 DEGREES
CALCULATED R AXIS, ECG10: 30 DEGREES
CALCULATED R AXIS, ECG10: 34 DEGREES
CALCULATED T AXIS, ECG11: 40 DEGREES
CALCULATED T AXIS, ECG11: 44 DEGREES
DIAGNOSIS, 93000: NORMAL
DIAGNOSIS, 93000: NORMAL
P-R INTERVAL, ECG05: 128 MS
P-R INTERVAL, ECG05: 136 MS
Q-T INTERVAL, ECG07: 356 MS
Q-T INTERVAL, ECG07: 358 MS
QRS DURATION, ECG06: 80 MS
QRS DURATION, ECG06: 86 MS
QTC CALCULATION (BEZET), ECG08: 437 MS
QTC CALCULATION (BEZET), ECG08: 445 MS
TROPONIN I SERPL-MCNC: <0.02 NG/ML (ref 0–0.04)
VENTRICULAR RATE, ECG03: 90 BPM
VENTRICULAR RATE, ECG03: 94 BPM

## 2019-04-15 PROCEDURE — 94640 AIRWAY INHALATION TREATMENT: CPT

## 2019-04-15 PROCEDURE — 74011250637 HC RX REV CODE- 250/637: Performed by: EMERGENCY MEDICINE

## 2019-04-15 RX ORDER — ALBUTEROL SULFATE 90 UG/1
AEROSOL, METERED RESPIRATORY (INHALATION)
Status: DISCONTINUED
Start: 2019-04-15 | End: 2019-04-15 | Stop reason: HOSPADM

## 2019-04-15 RX ORDER — ALBUTEROL SULFATE 90 UG/1
2 AEROSOL, METERED RESPIRATORY (INHALATION)
Status: DISCONTINUED | OUTPATIENT
Start: 2019-04-15 | End: 2019-04-15 | Stop reason: HOSPADM

## 2019-04-15 RX ADMIN — ALBUTEROL SULFATE 2 PUFF: 90 AEROSOL, METERED RESPIRATORY (INHALATION) at 02:04

## 2019-04-15 NOTE — DISCHARGE INSTRUCTIONS
Patient Education        Shortness of Breath: Care Instructions  Your Care Instructions  Shortness of breath has many causes. Sometimes conditions such as anxiety can lead to shortness of breath. Some people get mild shortness of breath when they exercise. Trouble breathing also can be a symptom of a serious problem, such as asthma, lung disease, emphysema, heart problems, and pneumonia. If your shortness of breath continues, you may need tests and treatment. Watch for any changes in your breathing and other symptoms. Follow-up care is a key part of your treatment and safety. Be sure to make and go to all appointments, and call your doctor if you are having problems. It's also a good idea to know your test results and keep a list of the medicines you take. How can you care for yourself at home? · Do not smoke or allow others to smoke around you. If you need help quitting, talk to your doctor about stop-smoking programs and medicines. These can increase your chances of quitting for good. · Get plenty of rest and sleep. · Take your medicines exactly as prescribed. Call your doctor if you think you are having a problem with your medicine. · Find healthy ways to deal with stress. ? Exercise daily. ? Get plenty of sleep. ? Eat regularly and well. When should you call for help? Call 911 anytime you think you may need emergency care. For example, call if:    · You have severe shortness of breath.     · You have symptoms of a heart attack. These may include:  ? Chest pain or pressure, or a strange feeling in the chest.  ? Sweating. ? Shortness of breath. ? Nausea or vomiting. ? Pain, pressure, or a strange feeling in the back, neck, jaw, or upper belly or in one or both shoulders or arms. ? Lightheadedness or sudden weakness. ? A fast or irregular heartbeat. After you call 911, the  may tell you to chew 1 adult-strength or 2 to 4 low-dose aspirin. Wait for an ambulance.  Do not try to drive yourself.    Call your doctor now or seek immediate medical care if:    · Your shortness of breath gets worse or you start to wheeze. Wheezing is a high-pitched sound when you breathe.     · You wake up at night out of breath or have to prop your head up on several pillows to breathe.     · You are short of breath after only light activity or while at rest.    Watch closely for changes in your health, and be sure to contact your doctor if:    · You do not get better over the next 1 to 2 days. Where can you learn more? Go to http://magda-kadie.info/. Enter S780 in the search box to learn more about \"Shortness of Breath: Care Instructions. \"  Current as of: September 5, 2018  Content Version: 11.9  © 8048-7318 [employer] Care instructions adapted under license by [employer] (which disclaims liability or warranty for this information). If you have questions about a medical condition or this instruction, always ask your healthcare professional. [address] any warranty or liability for your use of this information.

## 2019-04-15 NOTE — ED TRIAGE NOTES
Pt arrived to ED with complaint of SOB. Per pt, she was visiting daughter in [place] and became SOB and checked into the hospital there but decided to leave AMA because she wanted to be in a hospital closer to home. States she did not take any of her medications today for HTN.

## 2019-04-15 NOTE — ED PROVIDER NOTES
ER01/01 
 
46 y.o. BLACK OR  female Presents to the ED with Chief Complaint Patient presents with  Shortness of Breath HPI:  
08:19PM 
This is a 50-year-old female presents to emergency part for evaluation of shortness of breath. The patient has a history of COPD, she wears intermittent oxygen at home at 3-4 L/min via nasal cannula. She was visiting her daughter in [place] today, she felt ill and went to the emergency department. She was there for several hours, they plan on admitting her to the hospital, the patient decided to leave [address] She LEFT there at approximately 3:00 PM, and now presents here a similar symptom is for evaluation. She also has history of congestive heart failure, she takes Lasix at home on a daily basis. She is an active smoker. Symptoms are constant, moderate, with no other relieving or exacerbating factors ROS: 
14 organ system review of systems is complete and is negative except as addressed in the HPI Social History:  
Social History Socioeconomic History  Marital status:  Spouse name: Not on file  Number of children: Not on file  Years of education: Not on file  Highest education level: Not on file Occupational History  Not on file Social Needs  Financial resource strain: Not on file  Food insecurity:  
  Worry: Not on file Inability: Not on file  Transportation needs:  
  Medical: Not on file Non-medical: Not on file Tobacco Use  Smoking status: Current Every Day Smoker Packs/day: 0.50  Smokeless tobacco: Former User Substance and Sexual Activity  Alcohol use: No  
  Comment: socially  Drug use: No  
 Sexual activity: Never Comment: last used 9 years ago Lifestyle  Physical activity:  
  Days per week: Not on file Minutes per session: Not on file  Stress: Not on file Relationships  Social connections: Talks on phone: Not on file Gets together: Not on file Attends [employer] service: Not on file Active member of club or organization: Not on file Attends meetings of clubs or organizations: Not on file Relationship status: Not on file  Intimate partner violence:  
  Fear of current or ex partner: Not on file Emotionally abused: Not on file Physically abused: Not on file Forced sexual activity: Not on file Other Topics Concern  Not on file Social History Narrative  Not on file  
 
 reports that she has been smoking. She has been smoking about 0.50 packs per day. She has quit using smokeless tobacco. 
 
Family History: No family history on file. Past Medical History:  
Past Medical History:  
Diagnosis Date  Asthma  [facility]est[facility] [address]  Chronic obstructive pulmonary disease ([facility] [address]  Endocrine disease   
 thyroid issues  Gastrointestinal disorder \"blockage in my stomach\"  Hypertension Past Surgical History: No past surgical history on file. Primary Care: None Immunizations:  
 
Medications:  
Current Facility-Administered Medications:  
  albuterol-ipratropium (DUO-NEB) 2.5 MG-0.5 MG/3 ML, 3 mL, Nebulization, NOW, DatesMatthew MD 
 
Current Outpatient Medications:  
  albuterol (PROAIR HFA) 90 mcg/actuation inhaler, Take 2 Puffs by inhalation every four (4) hours as needed for Wheezing., Disp: 1 Inhaler, Rfl: 0 
  albuterol (PROVENTIL VENTOLIN) 2.5 mg /3 mL (0.083 %) nebulizer solution, 3 mL by Nebulization route every four (4) hours as needed for Wheezing., Disp: 60 Each, Rfl: 0 
  amLODIPine (NORVASC) 10 mg tablet, Take 1 Tab by mouth daily. , Disp: 30 Tab, Rfl: 0 
  arformoterol (BROVANA) 15 mcg/2 mL nebu neb solution, 2 mL by Nebulization route two (2) times a day., Disp: 60 Vial, Rfl: 0 
  budesonide (PULMICORT) 0.5 mg/2 mL nbsp, 2 mL by Nebulization route two (2) times a day., Disp: 60 Each, Rfl: 0 
   tiotropium (SPIRIVA) 18 mcg inhalation capsule, Take 1 Cap by inhalation daily. , Disp: 30 Cap, Rfl: 0 
  aspirin 81 mg chewable tablet, Take 1 Tab by mouth daily. , Disp: 30 Tab, Rfl: 0 
  atorvastatin (LIPITOR) 20 mg tablet, Take 1 Tab by mouth nightly., Disp: 30 Tab, Rfl: 0 
  cloNIDine HCl (CATAPRES) 0.2 mg tablet, Take 1 Tab by mouth two (2) times a day., Disp: 60 Tab, Rfl: 0 
  docusate sodium (COLACE) 100 mg capsule, Take 1 Cap by mouth two (2) times a day., Disp: 60 Cap, Rfl: 0 
  escitalopram oxalate (LEXAPRO) 10 mg tablet, Take 1 Tab by mouth every evening., Disp: 30 Tab, Rfl: 0 
  metoprolol tartrate (LOPRESSOR) 25 mg tablet, Take 0.5 Tabs by mouth every twelve (12) hours. , Disp: 30 Tab, Rfl: 0 
  predniSONE (DELTASONE) 10 mg tablet, 4 tabs ( 40 mg ) po daily for 3 days, then 2 tabs ( 20 mg ) po daily for 3 days, then 1 tab ( 10 mg ) po daily for 3 days, then stop, Disp: 21 Tab, Rfl: 0 
  QUEtiapine (SEROQUEL) 50 mg tablet, Take 1 Tab by mouth nightly., Disp: 30 Tab, Rfl: 0 
  roflumilast (DALIRESP) 500 mcg tab tablet, Take 1 Tab by mouth daily. , Disp: 30 Tab, Rfl: 0 
  OTHER, Check CBC, CMP, Mg in 4 days, results to PCP immediately, Diagnosis- asthma, Disp: 1 Each, Rfl: 0 
  OTHER, Incentive spirometry- use as directed, Disp: 1 Each, Rfl: 0 
  naloxone (NARCAN) 4 mg/actuation nasal spray, Use 1 spray intranasally, then discard. Repeat with new spray every 2 min as needed for opioid overdose symptoms, alternating nostrils. , Disp: 1 Each, Rfl: 0 
  budesonide-formoterol (SYMBICORT) 160-4.5 mcg/actuation HFAA, Take 2 Puffs by inhalation two (2) times a day., Disp: 1 Inhaler, Rfl: 1   ALPRAZolam (XANAX) 1 mg tablet, Take 1 Tab by mouth two (2) times a day. Max Daily Amount: 2 mg., Disp: 20 Tab, Rfl: 0 
  famotidine (PEPCID) 20 mg tablet, Take 1 Tab by mouth daily. , Disp: 30 Tab, Rfl: 1 
  montelukast (SINGULAIR) 10 mg tablet, Take 1 Tab by mouth nightly., Disp: 30 Tab, Rfl: 1 Allergies: No Known Allergies Last Cath Last Stress Test 
03/08/19 NUCLEAR CARDIAC STRESS TEST 03/13/2019 3/13/2019 Narrative · Baseline ECG: Normal sinus rhythm, T wave abnormality, consider  
inferolateral ischemia. · Gated SPECT: Left ventricular function post-stress was normal.  
Calculated ejection fraction is 75%. There is no evidence of transient  
ischemic dilation (TID). The TID ratio is 1. 
· Negative stress test. 
· Left ventricular perfusion is normal. 
· Negative myocardial perfusion imaging. Myocardial perfusion imaging  
supports a low risk stress test. 
   
  Signed by: Diann Gillespie MD  
 
 
Prior:ECHO 
03/08/19 ECHO ADULT COMPLETE 03/10/2019 3/10/2019 Narrative · Left ventricular hyperdynamic systolic function. Estimated left  
ventricular ejection fraction is >70%. Visually measured ejection  
fraction. Left ventricular moderate concentric hypertrophy. No regional  
wall motion abnormality noted. Inconclusive left ventricular diastolic  
function. · Right ventricular global systolic function is increased (hyperdynamic). · Trace mitral valve regurgitation. · Mild pulmonary hypertension is present. Signed by: Joan Gutierrez MD  
 
 
 
 
 
 
Physical Exam: 
. Patient Vitals for the past 12 hrs: 
 Temp Pulse Resp BP SpO2  
04/14/19 2005 99.8 °F (37.7 °C) 86 15 (!) 235/127 100 % Gen: Well developed, well nourished 46 y.o. female HEENT: Normocephalic, atraumatic. No scleral icterus. Extraocular movements intact. .  Normal mucous membranes. Uvula midline. Airway widely patent. Respiratory: No accessory muscle use. Moderate expiratory wheeze, No rales, No rhonchi. Normal chest wall excursion. No subcutaneous air, no rib crepitus Cardiovascular: Regular rhythm and rate, Normal pulses, Normal perfusion. No edema. Gastrointestinal: Non distended, Non tender, No masses. No ascites. No organomegaly. No evidence of trauma Musculoskeletal: Full range of motion at all other tested joints. No joint effusions. Neurological: Normal strength, Normal sensation. Normal speech. No ataxia. Cranial nerves II-XII normal as tested. Skin: No rash, petechia or purpura. Warm and dry Psychiatric: No suicidal ideation, No homicidal ideation. No hallucinations. Organized thoughts. Normal mood. normal affect. Heme: No lymphadenopathy. : Deferred Orders:  
Orders Placed This Encounter  albuterol-ipratropium (DUO-NEB) 2.5 MG-0.5 MG/3 ML  
 
 
ECG:  
Current: 
Normal sinus rhythm with sinus arrhythmia Possible Left atrial enlargement  
partial changes for LVH Comparison: . Imaging: Xr Chest [facility] Result Date: 4/14/2019 IMPRESSION: Mild CHF. Labs: 
Abnormal Labs Reviewed METABOLIC PANEL, COMPREHENSIVE - Abnormal; Notable for the following components:  
    Result Value GFR est non-AA 58 (*) AST (SGOT) 11 (*) All other components within normal limits EMERGENCY DEPARTMENT COURSE MEDICAL DECISION MAKING The patient remained pain free after a period of observation in the emergency department. No clinically significant ST or T wave changes on ECG monitoring. Initial and follow-up cardiac enzymes were noted. The patient is low risk for outpatient management. All of their questions were answered, and I encouraged close follow-up as an outpatient. I did discuss the risks and benefits of further treatment and observation both in the emergency department, as well as in the hospital under observation status. The patient ultimately decided to follow up as an outpatient. I counseled the patient that they may return at any time if they change their mind about wanting to stay for further testing. Diagnosis: #1 shortness of breath Disposition: 
Discharged Discharge Medications:  
Discharge Medication List as of 4/15/2019  1:03 AM  
  
 
 
 
 
 
Discharge Medications: Current Discharge Medication List  
  
 
 
Referral:  
 
Follow-up Information None 
  
  
 
 
(This chart was created with dictation software and an EHR. It may contain unintended unedited historical and or dictation errors)

## 2019-04-15 NOTE — ED NOTES
I have reviewed discharge instructions with the patient. The patient verbalized understanding. Patient armband removed and given to patient to take home. Patient was informed of the privacy risks if armband lost or stolen. Pt advised that medical transport would be set up for ride home due to need of continuous oxygen. Pt declined and left facility with family members.

## 2019-06-30 ENCOUNTER — APPOINTMENT (OUTPATIENT)
Dept: CT IMAGING | Age: 54
End: 2019-06-30
Attending: PHYSICIAN ASSISTANT
Payer: MEDICAID

## 2019-06-30 ENCOUNTER — HOSPITAL ENCOUNTER (EMERGENCY)
Age: 54
Discharge: HOME | End: 2019-06-30
Attending: EMERGENCY MEDICINE
Payer: MEDICAID

## 2019-06-30 VITALS
SYSTOLIC BLOOD PRESSURE: 173 MMHG | OXYGEN SATURATION: 100 % | HEIGHT: 59 IN | TEMPERATURE: 97.5 F | HEART RATE: 81 BPM | WEIGHT: 184 LBS | DIASTOLIC BLOOD PRESSURE: 158 MMHG | BODY MASS INDEX: 37.09 KG/M2 | RESPIRATION RATE: 18 BRPM

## 2019-06-30 DIAGNOSIS — I10 ESSENTIAL HYPERTENSION: ICD-10-CM

## 2019-06-30 DIAGNOSIS — G44.209 ACUTE NON INTRACTABLE TENSION-TYPE HEADACHE: Primary | ICD-10-CM

## 2019-06-30 LAB
ALBUMIN SERPL-MCNC: 3.8 G/DL (ref 3.4–5)
ALBUMIN/GLOB SERPL: 1.3 {RATIO} (ref 0.8–1.7)
ALP SERPL-CCNC: 124 U/L (ref 45–117)
ALT SERPL-CCNC: 33 U/L (ref 13–56)
ANION GAP SERPL CALC-SCNC: 5 MMOL/L (ref 3–18)
AST SERPL-CCNC: 21 U/L (ref 15–37)
BASOPHILS # BLD: 0 K/UL (ref 0–0.1)
BASOPHILS NFR BLD: 0 % (ref 0–2)
BILIRUB SERPL-MCNC: 0.2 MG/DL (ref 0.2–1)
BUN SERPL-MCNC: 18 MG/DL (ref 7–18)
BUN/CREAT SERPL: 18 (ref 12–20)
CALCIUM SERPL-MCNC: 8.7 MG/DL (ref 8.5–10.1)
CHLORIDE SERPL-SCNC: 102 MMOL/L (ref 100–108)
CO2 SERPL-SCNC: 33 MMOL/L (ref 21–32)
CREAT SERPL-MCNC: 0.98 MG/DL (ref 0.6–1.3)
DIFFERENTIAL METHOD BLD: NORMAL
EOSINOPHIL # BLD: 0.2 K/UL (ref 0–0.4)
EOSINOPHIL NFR BLD: 2 % (ref 0–5)
ERYTHROCYTE [DISTWIDTH] IN BLOOD BY AUTOMATED COUNT: 12.5 % (ref 11.6–14.5)
GLOBULIN SER CALC-MCNC: 2.9 G/DL (ref 2–4)
GLUCOSE SERPL-MCNC: 109 MG/DL (ref 74–99)
HCT VFR BLD AUTO: 41.6 % (ref 35–45)
HGB BLD-MCNC: 13.8 G/DL (ref 12–16)
LYMPHOCYTES # BLD: 2.2 K/UL (ref 0.9–3.6)
LYMPHOCYTES NFR BLD: 28 % (ref 21–52)
MCH RBC QN AUTO: 28.5 PG (ref 24–34)
MCHC RBC AUTO-ENTMCNC: 33.2 G/DL (ref 31–37)
MCV RBC AUTO: 86 FL (ref 74–97)
MONOCYTES # BLD: 0.3 K/UL (ref 0.05–1.2)
MONOCYTES NFR BLD: 4 % (ref 3–10)
NEUTS SEG # BLD: 5.2 K/UL (ref 1.8–8)
NEUTS SEG NFR BLD: 66 % (ref 40–73)
PLATELET # BLD AUTO: 243 K/UL (ref 135–420)
PMV BLD AUTO: 11.2 FL (ref 9.2–11.8)
POTASSIUM SERPL-SCNC: 4.3 MMOL/L (ref 3.5–5.5)
PROT SERPL-MCNC: 6.7 G/DL (ref 6.4–8.2)
RBC # BLD AUTO: 4.84 M/UL (ref 4.2–5.3)
SODIUM SERPL-SCNC: 140 MMOL/L (ref 136–145)
WBC # BLD AUTO: 7.9 K/UL (ref 4.6–13.2)

## 2019-06-30 PROCEDURE — 85025 COMPLETE CBC W/AUTO DIFF WBC: CPT

## 2019-06-30 PROCEDURE — 80053 COMPREHEN METABOLIC PANEL: CPT

## 2019-06-30 PROCEDURE — 70450 CT HEAD/BRAIN W/O DYE: CPT

## 2019-06-30 PROCEDURE — 74011250636 HC RX REV CODE- 250/636: Performed by: PHYSICIAN ASSISTANT

## 2019-06-30 PROCEDURE — 96374 THER/PROPH/DIAG INJ IV PUSH: CPT

## 2019-06-30 PROCEDURE — 99283 EMERGENCY DEPT VISIT LOW MDM: CPT

## 2019-06-30 PROCEDURE — 96361 HYDRATE IV INFUSION ADD-ON: CPT

## 2019-06-30 PROCEDURE — 96375 TX/PRO/DX INJ NEW DRUG ADDON: CPT

## 2019-06-30 PROCEDURE — 74011250637 HC RX REV CODE- 250/637: Performed by: PHYSICIAN ASSISTANT

## 2019-06-30 RX ORDER — DIPHENHYDRAMINE HYDROCHLORIDE 50 MG/ML
12.5 INJECTION, SOLUTION INTRAMUSCULAR; INTRAVENOUS
Status: COMPLETED | OUTPATIENT
Start: 2019-06-30 | End: 2019-06-30

## 2019-06-30 RX ORDER — KETOROLAC TROMETHAMINE 30 MG/ML
30 INJECTION, SOLUTION INTRAMUSCULAR; INTRAVENOUS
Status: COMPLETED | OUTPATIENT
Start: 2019-06-30 | End: 2019-06-30

## 2019-06-30 RX ORDER — ONDANSETRON 2 MG/ML
4 INJECTION INTRAMUSCULAR; INTRAVENOUS ONCE
Status: COMPLETED | OUTPATIENT
Start: 2019-06-30 | End: 2019-06-30

## 2019-06-30 RX ORDER — BUTALBITAL, ACETAMINOPHEN AND CAFFEINE 300; 40; 50 MG/1; MG/1; MG/1
1 CAPSULE ORAL
Qty: 10 CAP | Refills: 0 | Status: SHIPPED | OUTPATIENT
Start: 2019-06-30 | End: 2019-11-03

## 2019-06-30 RX ORDER — ACETAMINOPHEN 500 MG
1000 TABLET ORAL
Status: COMPLETED | OUTPATIENT
Start: 2019-06-30 | End: 2019-06-30

## 2019-06-30 RX ADMIN — ONDANSETRON 4 MG: 2 INJECTION INTRAMUSCULAR; INTRAVENOUS at 19:00

## 2019-06-30 RX ADMIN — KETOROLAC TROMETHAMINE 30 MG: 30 INJECTION, SOLUTION INTRAMUSCULAR at 17:04

## 2019-06-30 RX ADMIN — DIPHENHYDRAMINE HYDROCHLORIDE 12.5 MG: 50 INJECTION, SOLUTION INTRAMUSCULAR; INTRAVENOUS at 19:00

## 2019-06-30 RX ADMIN — SODIUM CHLORIDE 1000 ML: 900 INJECTION, SOLUTION INTRAVENOUS at 17:04

## 2019-06-30 RX ADMIN — ACETAMINOPHEN 1000 MG: 500 TABLET, FILM COATED ORAL at 19:00

## 2019-06-30 NOTE — ED PROVIDER NOTES
EMERGENCY DEPARTMENT HISTORY AND PHYSICAL EXAM    5:28 PM      Date: 6/30/2019  Patient Name: Cesilia Ingram    History of Presenting Illness     Chief Complaint   Patient presents with    Hypertension    Headache         History Provided By: Patient    Chief Complaint: headache       Additional History (Context): Cesilia Ingram is a 46 y.o. female with h/o CHF, COPD and HTN who presents with recurrent headaches over the past weeks. She believes this is due to her elevated blood pressure. She states her blood pressure has been over 200 for the past week. She has been taking her medications as prescribed. She denies any chest pain or shortness of breath. Her headache is moderate currently and she has been taking Tylenol and ibuprofen without improvement. She denies any dizziness, vision changes, numbness, weakness. She was admitted for chest pain and COPD exacerbation in March, discharged on new hypertensive meds and has not followed up with a PCP since then. She had a negative stress test at that time. She is currently admitted at the crisis stabilization unit in [place] and will be discharged there after ER visit. She states that she has tried to make an appointment with a PCP but is not until July 8. PCP: Shalini Knox MD    Current Outpatient Medications   Medication Sig Dispense Refill    butalbital-acetaminophen-caff (FIORICET) -40 mg per capsule Take 1 Cap by mouth every four (4) hours as needed for Pain. 10 Cap 0    albuterol (PROAIR HFA) 90 mcg/actuation inhaler Take 2 Puffs by inhalation every four (4) hours as needed for Wheezing. 1 Inhaler 0    albuterol (PROVENTIL VENTOLIN) 2.5 mg /3 mL (0.083 %) nebulizer solution 3 mL by Nebulization route every four (4) hours as needed for Wheezing. 60 Each 0    amLODIPine (NORVASC) 10 mg tablet Take 1 Tab by mouth daily. 30 Tab 0    arformoterol (BROVANA) 15 mcg/2 mL nebu neb solution 2 mL by Nebulization route two (2) times a day.  60 Vial 0  budesonide (PULMICORT) 0.5 mg/2 mL nbsp 2 mL by Nebulization route two (2) times a day. 60 Each 0    tiotropium (SPIRIVA) 18 mcg inhalation capsule Take 1 Cap by inhalation daily. 30 Cap 0    aspirin 81 mg chewable tablet Take 1 Tab by mouth daily. 30 Tab 0    atorvastatin (LIPITOR) 20 mg tablet Take 1 Tab by mouth nightly. 30 Tab 0    cloNIDine HCl (CATAPRES) 0.2 mg tablet Take 1 Tab by mouth two (2) times a day. 60 Tab 0    docusate sodium (COLACE) 100 mg capsule Take 1 Cap by mouth two (2) times a day. 60 Cap 0    escitalopram oxalate (LEXAPRO) 10 mg tablet Take 1 Tab by mouth every evening. 30 Tab 0    metoprolol tartrate (LOPRESSOR) 25 mg tablet Take 0.5 Tabs by mouth every twelve (12) hours. 30 Tab 0    predniSONE (DELTASONE) 10 mg tablet 4 tabs ( 40 mg ) po daily for 3 days, then 2 tabs ( 20 mg ) po daily for 3 days, then 1 tab ( 10 mg ) po daily for 3 days, then stop 21 Tab 0    QUEtiapine (SEROQUEL) 50 mg tablet Take 1 Tab by mouth nightly. 30 Tab 0    roflumilast (DALIRESP) 500 mcg tab tablet Take 1 Tab by mouth daily. 30 Tab 0    OTHER Check CBC, CMP, Mg in 4 days, results to PCP immediately, Diagnosis- asthma 1 Each 0    OTHER Incentive spirometry- use as directed 1 Each 0    naloxone (NARCAN) 4 mg/actuation nasal spray Use 1 spray intranasally, then discard. Repeat with new spray every 2 min as needed for opioid overdose symptoms, alternating nostrils. 1 Each 0    budesonide-formoterol (SYMBICORT) 160-4.5 mcg/actuation HFAA Take 2 Puffs by inhalation two (2) times a day. 1 Inhaler 1    ALPRAZolam (XANAX) 1 mg tablet Take 1 Tab by mouth two (2) times a day. Max Daily Amount: 2 mg. 20 Tab 0    famotidine (PEPCID) 20 mg tablet Take 1 Tab by mouth daily. 30 Tab 1    montelukast (SINGULAIR) 10 mg tablet Take 1 Tab by mouth nightly.  30 Tab 1       Past History     Past Medical History:  Past Medical History:   Diagnosis Date    Asthma     [facility]congest[facility] failure) ([facility] [address]     Chronic obstructive pulmonary disease (HCC)     Endocrine disease     thyroid issues    Gastrointestinal disorder     \"blockage in my stomach\"    Hypertension        Past Surgical History:  History reviewed. No pertinent surgical history. Family History:  History reviewed. No pertinent family history. Social History:  Social History     Tobacco Use    Smoking status: Current Every Day Smoker     Packs/day: 0.50    Smokeless tobacco: Former User   Substance Use Topics    Alcohol use: No     Comment: socially    Drug use: No       Allergies:  No Known Allergies      Review of Systems       Review of Systems   Constitutional: Negative for fever. HENT: Negative for facial swelling. Eyes: Negative for visual disturbance. Respiratory: Negative for shortness of breath. Cardiovascular: Negative for chest pain. Gastrointestinal: Negative for abdominal pain. Genitourinary: Negative for dysuria. Musculoskeletal: Negative for neck pain. Skin: Negative for rash. Neurological: Positive for headaches. Negative for dizziness, weakness, light-headedness and numbness. Psychiatric/Behavioral: Negative for confusion. All other systems reviewed and are negative. Physical Exam     Visit Vitals  /86   Pulse 81   Temp 97.5 °F (36.4 °C)   Resp 18   Ht 4' 11\" (1.499 m)   Wt 83.5 kg (184 lb)   LMP 04/14/2009   SpO2 100%   Breastfeeding? No   BMI 37.16 kg/m²         Physical Exam   Constitutional: She is oriented to person, place, and time. She appears well-developed and well-nourished. No distress. HENT:   Head: Normocephalic and atraumatic. Eyes: Conjunctivae are normal.   Neck: Normal range of motion. Cardiovascular: Normal rate and regular rhythm. Pulmonary/Chest: Effort normal and breath sounds normal.   Abdominal: She exhibits no distension. Musculoskeletal: Normal range of motion. Neurological: She is alert and oriented to person, place, and time. No cranial nerve deficit. Coordination normal.   Skin: Skin is warm and dry. She is not diaphoretic. Psychiatric: She has a normal mood and affect. Nursing note and vitals reviewed. Diagnostic Study Results     Labs -  Recent Results (from the past 12 hour(s))   CBC WITH AUTOMATED DIFF    Collection Time: 06/30/19  4:05 PM   Result Value Ref Range    WBC 7.9 4.6 - 13.2 K/uL    RBC 4.84 4.20 - 5.30 M/uL    HGB 13.8 12.0 - 16.0 g/dL    HCT 41.6 35.0 - 45.0 %    MCV 86.0 74.0 - 97.0 FL    MCH 28.5 24.0 - 34.0 PG    MCHC 33.2 31.0 - 37.0 g/dL    RDW 12.5 11.6 - 14.5 %    PLATELET 006 726 - 336 K/uL    MPV 11.2 9.2 - 11.8 FL    NEUTROPHILS 66 40 - 73 %    LYMPHOCYTES 28 21 - 52 %    MONOCYTES 4 3 - 10 %    EOSINOPHILS 2 0 - 5 %    BASOPHILS 0 0 - 2 %    ABS. NEUTROPHILS 5.2 1.8 - 8.0 K/UL    ABS. LYMPHOCYTES 2.2 0.9 - 3.6 K/UL    ABS. MONOCYTES 0.3 0.05 - 1.2 K/UL    ABS. EOSINOPHILS 0.2 0.0 - 0.4 K/UL    ABS. BASOPHILS 0.0 0.0 - 0.1 K/UL    DF AUTOMATED     METABOLIC PANEL, COMPREHENSIVE    Collection Time: 06/30/19  4:05 PM   Result Value Ref Range    Sodium 140 136 - 145 mmol/L    Potassium 4.3 3.5 - 5.5 mmol/L    Chloride 102 100 - 108 mmol/L    CO2 33 (H) 21 - 32 mmol/L    Anion gap 5 3.0 - 18 mmol/L    Glucose 109 (H) 74 - 99 mg/dL    BUN 18 7.0 - 18 MG/DL    Creatinine 0.98 0.6 - 1.3 MG/DL    BUN/Creatinine ratio 18 12 - 20      GFR est AA >60 >60 ml/min/1.73m2    GFR est non-AA 59 (L) >60 ml/min/1.73m2    Calcium 8.7 8.5 - 10.1 MG/DL    Bilirubin, total 0.2 0.2 - 1.0 MG/DL    ALT (SGPT) 33 13 - 56 U/L    AST (SGOT) 21 15 - 37 U/L    Alk. phosphatase 124 (H) 45 - 117 U/L    Protein, total 6.7 6.4 - 8.2 g/dL    Albumin 3.8 3.4 - 5.0 g/dL    Globulin 2.9 2.0 - 4.0 g/dL    A-G Ratio 1.3 0.8 - 1.7         Radiologic Studies -   CT HEAD WO CONT    (Results Pending)         Medical Decision Making   I am the first provider for this patient.     I reviewed the vital signs, available nursing notes, past medical history, past surgical history, family history and social history. Vital Signs-Reviewed the patient's vital signs. Records Reviewed: Nursing Notes (Time of Review: 5:28 PM)    ED Course: Progress Notes, Reevaluation, and Consults:      Provider Notes (Medical Decision Making): MDM  Number of Diagnoses or Management Options  Acute non intractable tension-type headache:   Essential hypertension:   Diagnosis management comments: 78-year-old female with history of COPD, HTN, CHF, complaining of headache and elevated blood pressure. BP is 177/90 in the ED. We will treat headache and review head CT. Will need  assistance with setting up PCP appointment to reevaluate hypertension meds. 7:51 PM  Symptoms have resolved with hydration. Patient states she has not been drinking water and her mouth always feels dry. Consulted  for PCP to help manage HTN. Discussed treatment plan, return precautions, symptomatic relief, and expected time to improvement. All questions answered. Patient is stable for discharge and outpatient management. Diagnosis     Clinical Impression:   1. Acute non intractable tension-type headache    2. Essential hypertension        Disposition: Discharged      Follow-up Information     Follow up With Specialties Details Why [address]  Schedule an appointment as soon as possible for a visit  [address] EMERGENCY DEPT Emergency Medicine  Immediately if symptoms worsen [facility].  [phone]           Patient's Medications   Start Taking    BUTALBITAL-ACETAMINOPHEN-CAFF (FIORICET) -40 MG PER CAPSULE    Take 1 Cap by mouth every four (4) hours as needed for Pain. Continue Taking    ALBUTEROL (PROAIR HFA) 90 MCG/ACTUATION INHALER    Take 2 Puffs by inhalation every four (4) hours as needed for Wheezing.     ALBUTEROL (PROVENTIL VENTOLIN) 2.5 MG /3 ML (0.083 %) NEBULIZER SOLUTION    3 mL by Nebulization route every four (4) hours as needed for Wheezing. ALPRAZOLAM (XANAX) 1 MG TABLET    Take 1 Tab by mouth two (2) times a day. Max Daily Amount: 2 mg. AMLODIPINE (NORVASC) 10 MG TABLET    Take 1 Tab by mouth daily. ARFORMOTEROL (BROVANA) 15 MCG/2 ML NEBU NEB SOLUTION    2 mL by Nebulization route two (2) times a day. ASPIRIN 81 MG CHEWABLE TABLET    Take 1 Tab by mouth daily. ATORVASTATIN (LIPITOR) 20 MG TABLET    Take 1 Tab by mouth nightly. BUDESONIDE (PULMICORT) 0.5 MG/2 ML NBSP    2 mL by Nebulization route two (2) times a day. BUDESONIDE-FORMOTEROL (SYMBICORT) 160-4.5 MCG/ACTUATION HFAA    Take 2 Puffs by inhalation two (2) times a day. CLONIDINE HCL (CATAPRES) 0.2 MG TABLET    Take 1 Tab by mouth two (2) times a day. DOCUSATE SODIUM (COLACE) 100 MG CAPSULE    Take 1 Cap by mouth two (2) times a day. ESCITALOPRAM OXALATE (LEXAPRO) 10 MG TABLET    Take 1 Tab by mouth every evening. FAMOTIDINE (PEPCID) 20 MG TABLET    Take 1 Tab by mouth daily. METOPROLOL TARTRATE (LOPRESSOR) 25 MG TABLET    Take 0.5 Tabs by mouth every twelve (12) hours. MONTELUKAST (SINGULAIR) 10 MG TABLET    Take 1 Tab by mouth nightly. NALOXONE (NARCAN) 4 MG/ACTUATION NASAL SPRAY    Use 1 spray intranasally, then discard. Repeat with new spray every 2 min as needed for opioid overdose symptoms, alternating nostrils. OTHER    Check CBC, CMP, Mg in 4 days, results to PCP immediately, Diagnosis- asthma    OTHER    Incentive spirometry- use as directed    PREDNISONE (DELTASONE) 10 MG TABLET    4 tabs ( 40 mg ) po daily for 3 days, then 2 tabs ( 20 mg ) po daily for 3 days, then 1 tab ( 10 mg ) po daily for 3 days, then stop    QUETIAPINE (SEROQUEL) 50 MG TABLET    Take 1 Tab by mouth nightly. ROFLUMILAST (DALIRESP) 500 MCG TAB TABLET    Take 1 Tab by mouth daily. TIOTROPIUM (SPIRIVA) 18 MCG INHALATION CAPSULE    Take 1 Cap by inhalation daily.    These Medications have changed    No medications on file   Stop Taking    No medications on file     _______________________________    Attestations:  Sherly [address] PA-C acting as a scribe for and in the presence of JEAN Ibrahim      June 30, 2019 at 7:51 PM       Provider Attestation:      I personally performed the services described in the documentation, reviewed the documentation, as recorded by the scribe in my presence, and it accurately and completely records my words and actions.  June 30, 2019 at 7:51 PM - JEAN Ibrahim  _______________________________

## 2019-06-30 NOTE — DISCHARGE INSTRUCTIONS
Patient Education        Headache: Care Instructions  Your Care Instructions    Headaches have many possible causes. Most headaches aren't a sign of a more serious problem, and they will get better on their own. Home treatment may help you feel better faster. The doctor has checked you carefully, but problems can develop later. If you notice any problems or new symptoms, get medical treatment right away. Follow-up care is a key part of your treatment and safety. Be sure to make and go to all appointments, and call your doctor if you are having problems. It's also a good idea to know your test results and keep a list of the medicines you take. How can you care for yourself at home? · Do not drive if you have taken a prescription pain medicine. · Rest in a quiet, dark room until your headache is gone. Close your eyes and try to relax or go to sleep. Don't watch TV or read. · Put a cold, moist cloth or cold pack on the painful area for 10 to 20 minutes at a time. Put a thin cloth between the cold pack and your skin. · Use a warm, moist towel or a heating pad set on low to relax tight shoulder and neck muscles. · Have someone gently massage your neck and shoulders. · Take pain medicines exactly as directed. ? If the doctor gave you a prescription medicine for pain, take it as prescribed. ? If you are not taking a prescription pain medicine, ask your doctor if you can take an over-the-counter medicine. · Be careful not to take pain medicine more often than the instructions allow, because you may get worse or more frequent headaches when the medicine wears off. · Do not ignore new symptoms that occur with a headache, such as a fever, weakness or numbness, vision changes, or confusion. These may be signs of a more serious problem. To prevent headaches  · Keep a headache diary so you can figure out what triggers your headaches. Avoiding triggers may help you prevent headaches.  Record when each headache began, how long it lasted, and what the pain was like (throbbing, aching, stabbing, or dull). Write down any other symptoms you had with the headache, such as nausea, flashing lights or dark spots, or sensitivity to bright light or loud noise. Note if the headache occurred near your period. List anything that might have triggered the headache, such as certain foods (chocolate, cheese, wine) or odors, smoke, bright light, stress, or lack of sleep. · Find healthy ways to deal with stress. Headaches are most common during or right after stressful times. Take time to relax before and after you do something that has caused a headache in the past.  · Try to keep your muscles relaxed by keeping good posture. Check your jaw, face, neck, and shoulder muscles for tension, and try relaxing them. When sitting at a desk, change positions often, and stretch for 30 seconds each hour. · Get plenty of sleep and exercise. · Eat regularly and well. Long periods without food can trigger a headache. · Treat yourself to a massage. Some people find that regular massages are very helpful in relieving tension. · Limit caffeine by not drinking too much coffee, tea, or soda. But don't quit caffeine suddenly, because that can also give you headaches. · Reduce eyestrain from computers by blinking frequently and looking away from the computer screen every so often. Make sure you have proper eyewear and that your monitor is set up properly, about an arm's length away. · Seek help if you have depression or anxiety. Your headaches may be linked to these conditions. Treatment can both prevent headaches and help with symptoms of anxiety or depression. When should you call for help? Call 911 anytime you think you may need emergency care. For example, call if:    · You have signs of a stroke. These may include:  ? Sudden numbness, paralysis, or weakness in your face, arm, or leg, especially on only one side of your body.   ? Sudden vision changes. ? Sudden trouble speaking. ? Sudden confusion or trouble understanding simple statements. ? Sudden problems with walking or balance. ? A sudden, severe headache that is different from past headaches.    Call your doctor now or seek immediate medical care if:    · You have a new or worse headache.     · Your headache gets much worse. Where can you learn more? Go to http://magda-kadie.info/. Enter M271 in the search box to learn more about \"Headache: Care Instructions. \"  Current as of: Eve 3, 2018  Content Version: 11.9  © 8694-3651 [employer]. Care instructions adapted under license by [employer] (which disclaims liability or warranty for this information). If you have questions about a medical condition or this instruction, always ask your healthcare professional. [address] any warranty or liability for your use of this information.

## 2019-07-01 NOTE — ED NOTES
Spoke to PAT at crisis stabilization center inquiring if the patient is ready to be d/c as she is her ride back to the facility. Advised her she was. PT d/c. Advised PA about pt bp. ..per PA patient needs to follow up with facility. I have reviewed discharge instructions with the patient. The patient verbalized understanding. Patient armband removed and shredded    Patient ambulatory to ED jp.

## 2019-07-03 ENCOUNTER — HOSPITAL ENCOUNTER (EMERGENCY)
Age: 54
Discharge: NURSING, REHAB OR LONG-TERM CARE | End: 2019-07-03
Attending: EMERGENCY MEDICINE
Payer: MEDICAID

## 2019-07-03 VITALS
OXYGEN SATURATION: 99 % | DIASTOLIC BLOOD PRESSURE: 88 MMHG | HEART RATE: 68 BPM | TEMPERATURE: 97.9 F | SYSTOLIC BLOOD PRESSURE: 131 MMHG | RESPIRATION RATE: 16 BRPM

## 2019-07-03 DIAGNOSIS — R55 SYNCOPE AND COLLAPSE: Primary | ICD-10-CM

## 2019-07-03 LAB
ANION GAP SERPL CALC-SCNC: 6 MMOL/L (ref 3–18)
BASOPHILS # BLD: 0 K/UL (ref 0–0.1)
BASOPHILS NFR BLD: 0 % (ref 0–2)
BUN SERPL-MCNC: 33 MG/DL (ref 7–18)
BUN/CREAT SERPL: 32 (ref 12–20)
CALCIUM SERPL-MCNC: 8.4 MG/DL (ref 8.5–10.1)
CHLORIDE SERPL-SCNC: 109 MMOL/L (ref 100–108)
CK MB CFR SERPL CALC: NORMAL % (ref 0–4)
CK MB SERPL-MCNC: <1 NG/ML (ref 5–25)
CK SERPL-CCNC: 93 U/L (ref 26–192)
CO2 SERPL-SCNC: 27 MMOL/L (ref 21–32)
CREAT SERPL-MCNC: 1.03 MG/DL (ref 0.6–1.3)
DIFFERENTIAL METHOD BLD: NORMAL
EOSINOPHIL # BLD: 0.2 K/UL (ref 0–0.4)
EOSINOPHIL NFR BLD: 3 % (ref 0–5)
ERYTHROCYTE [DISTWIDTH] IN BLOOD BY AUTOMATED COUNT: 12.9 % (ref 11.6–14.5)
GLUCOSE SERPL-MCNC: 80 MG/DL (ref 74–99)
HCT VFR BLD AUTO: 41 % (ref 35–45)
HGB BLD-MCNC: 13 G/DL (ref 12–16)
LYMPHOCYTES # BLD: 2.3 K/UL (ref 0.9–3.6)
LYMPHOCYTES NFR BLD: 36 % (ref 21–52)
MCH RBC QN AUTO: 28.2 PG (ref 24–34)
MCHC RBC AUTO-ENTMCNC: 31.7 G/DL (ref 31–37)
MCV RBC AUTO: 88.9 FL (ref 74–97)
MONOCYTES # BLD: 0.4 K/UL (ref 0.05–1.2)
MONOCYTES NFR BLD: 6 % (ref 3–10)
NEUTS SEG # BLD: 3.6 K/UL (ref 1.8–8)
NEUTS SEG NFR BLD: 55 % (ref 40–73)
PLATELET # BLD AUTO: 180 K/UL (ref 135–420)
PMV BLD AUTO: 10.7 FL (ref 9.2–11.8)
POTASSIUM SERPL-SCNC: 5.6 MMOL/L (ref 3.5–5.5)
RBC # BLD AUTO: 4.61 M/UL (ref 4.2–5.3)
SODIUM SERPL-SCNC: 142 MMOL/L (ref 136–145)
TROPONIN I SERPL-MCNC: <0.02 NG/ML (ref 0–0.04)
WBC # BLD AUTO: 6.4 K/UL (ref 4.6–13.2)

## 2019-07-03 PROCEDURE — 93005 ELECTROCARDIOGRAM TRACING: CPT

## 2019-07-03 PROCEDURE — 74011250637 HC RX REV CODE- 250/637: Performed by: EMERGENCY MEDICINE

## 2019-07-03 PROCEDURE — 99285 EMERGENCY DEPT VISIT HI MDM: CPT

## 2019-07-03 PROCEDURE — 82550 ASSAY OF CK (CPK): CPT

## 2019-07-03 PROCEDURE — 85025 COMPLETE CBC W/AUTO DIFF WBC: CPT

## 2019-07-03 PROCEDURE — 80048 BASIC METABOLIC PNL TOTAL CA: CPT

## 2019-07-03 RX ORDER — IBUPROFEN 400 MG/1
800 TABLET ORAL
Status: DISCONTINUED | OUTPATIENT
Start: 2019-07-03 | End: 2019-07-03 | Stop reason: HOSPADM

## 2019-07-03 RX ORDER — IBUPROFEN 600 MG/1
600 TABLET ORAL
Status: COMPLETED | OUTPATIENT
Start: 2019-07-03 | End: 2019-07-03

## 2019-07-03 RX ORDER — ACETAMINOPHEN 500 MG
1000 TABLET ORAL
Status: DISCONTINUED | OUTPATIENT
Start: 2019-07-03 | End: 2019-07-03 | Stop reason: HOSPADM

## 2019-07-03 RX ADMIN — IBUPROFEN 600 MG: 600 TABLET ORAL at 15:16

## 2019-07-03 NOTE — ED NOTES
Attempted to review discharge information with patient, patient pushed my computer out of the way and stated \"I hope I have a stroke or something so I can saulo you.  \"

## 2019-07-03 NOTE — ED NOTES
Patient has tolerated medication well stated \"It's starting to work. Thank you\" Denies intolerable pain at this time. Remains alert and stable. Ambulatory ad tess. Leaving at this time with [facility].

## 2019-07-03 NOTE — ED TRIAGE NOTES
Arrived via EMS stated she was found unconscious. Patient currently in crisis stabalization center and was found by nurse. Pt denies remembering falling.  Currently A&O x 4 c/o headache

## 2019-07-03 NOTE — ED PROVIDER NOTES
[facility] EMERGENCY DEPT      11:57 AM patient sleeping    Date: 7/3/2019  Patient Name: Ana M De La Cruz    History of Presenting Illness     Chief Complaint   Patient presents with    Syncope       46 y.o. female with noted past medical history who presents to the emergency department status post syncope earlier this morning. Patient states she was in usual state of health this morning and while talking on the phone she had fairly sudden onset of some difficulty speaking and some lightheadedness followed shortly thereafter by a syncopal event. She states she initially went to talk to a nursing staff to tell them how she was feeling but did not make it there for having syncope. She states in a crisis intervention center center with staff there secondary to getting detox for heroin. Currently in the ER the patient asymptomatic and feels at her baseline. Patient denies any other associated signs or symptoms. Patient denies any other complaints. Nursing notes regarding the HPI and triage nursing notes were reviewed. Prior medical records were reviewed. Current Facility-Administered Medications   Medication Dose Route Frequency Provider Last Rate Last Dose    acetaminophen (TYLENOL) tablet 1,000 mg  1,000 mg Oral NOW Temo Muro MD        ibuprofen (MOTRIN) tablet 800 mg  800 mg Oral NOW Temo Muro MD         Current Outpatient Medications   Medication Sig Dispense Refill    butalbital-acetaminophen-caff (FIORICET) -40 mg per capsule Take 1 Cap by mouth every four (4) hours as needed for Pain. 10 Cap 0    albuterol (PROAIR HFA) 90 mcg/actuation inhaler Take 2 Puffs by inhalation every four (4) hours as needed for Wheezing. 1 Inhaler 0    albuterol (PROVENTIL VENTOLIN) 2.5 mg /3 mL (0.083 %) nebulizer solution 3 mL by Nebulization route every four (4) hours as needed for Wheezing. 60 Each 0    amLODIPine (NORVASC) 10 mg tablet Take 1 Tab by mouth daily.  30 Tab 0  arformoterol (BROVANA) 15 mcg/2 mL nebu neb solution 2 mL by Nebulization route two (2) times a day. 60 Vial 0    budesonide (PULMICORT) 0.5 mg/2 mL nbsp 2 mL by Nebulization route two (2) times a day. 60 Each 0    tiotropium (SPIRIVA) 18 mcg inhalation capsule Take 1 Cap by inhalation daily. 30 Cap 0    aspirin 81 mg chewable tablet Take 1 Tab by mouth daily. 30 Tab 0    atorvastatin (LIPITOR) 20 mg tablet Take 1 Tab by mouth nightly. 30 Tab 0    cloNIDine HCl (CATAPRES) 0.2 mg tablet Take 1 Tab by mouth two (2) times a day. 60 Tab 0    docusate sodium (COLACE) 100 mg capsule Take 1 Cap by mouth two (2) times a day. 60 Cap 0    escitalopram oxalate (LEXAPRO) 10 mg tablet Take 1 Tab by mouth every evening. 30 Tab 0    metoprolol tartrate (LOPRESSOR) 25 mg tablet Take 0.5 Tabs by mouth every twelve (12) hours. 30 Tab 0    predniSONE (DELTASONE) 10 mg tablet 4 tabs ( 40 mg ) po daily for 3 days, then 2 tabs ( 20 mg ) po daily for 3 days, then 1 tab ( 10 mg ) po daily for 3 days, then stop 21 Tab 0    QUEtiapine (SEROQUEL) 50 mg tablet Take 1 Tab by mouth nightly. 30 Tab 0    roflumilast (DALIRESP) 500 mcg tab tablet Take 1 Tab by mouth daily. 30 Tab 0    OTHER Check CBC, CMP, Mg in 4 days, results to PCP immediately, Diagnosis- asthma 1 Each 0    OTHER Incentive spirometry- use as directed 1 Each 0    naloxone (NARCAN) 4 mg/actuation nasal spray Use 1 spray intranasally, then discard. Repeat with new spray every 2 min as needed for opioid overdose symptoms, alternating nostrils. 1 Each 0    budesonide-formoterol (SYMBICORT) 160-4.5 mcg/actuation HFAA Take 2 Puffs by inhalation two (2) times a day. 1 Inhaler 1    ALPRAZolam (XANAX) 1 mg tablet Take 1 Tab by mouth two (2) times a day. Max Daily Amount: 2 mg. 20 Tab 0    famotidine (PEPCID) 20 mg tablet Take 1 Tab by mouth daily. 30 Tab 1    montelukast (SINGULAIR) 10 mg tablet Take 1 Tab by mouth nightly.  30 Tab 1       Past History     Past Medical History:  Past Medical History:   Diagnosis Date    Asthma     CHF (congestive heart failure) (HCC)     Chronic obstructive pulmonary disease (HCC)     Endocrine disease     thyroid issues    Gastrointestinal disorder     \"blockage in my stomach\"    Hypertension        Past Surgical History:  No past surgical history on file. Family History:  No family history on file. Social History:  Social History     Tobacco Use    Smoking status: Current Every Day Smoker     Packs/day: 0.50    Smokeless tobacco: Former User   Substance Use Topics    Alcohol use: No     Comment: socially    Drug use: No       Allergies:  No Known Allergies    Patient's primary care provider (as noted in EPIC):  None    Review of Systems   Constitutional: Negative for diaphoresis. HENT: Negative for congestion. Eyes: Negative for discharge. Respiratory: Negative for stridor. Cardiovascular: Negative for palpitations. Gastrointestinal: Negative for diarrhea. Endocrine: Negative for heat intolerance. Genitourinary: Negative for flank pain. Musculoskeletal: Negative for back pain. Neurological: Positive for syncope. Negative for weakness. Psychiatric/Behavioral: Negative for hallucinations. All other systems reviewed and are negative. Visit Vitals  BP (!) 135/93   Pulse 72   Temp 97.9 °F (36.6 °C)   Resp 16   SpO2 99%       PHYSICAL EXAM:    CONSTITUTIONAL:  Alert, in no apparent distress;  well developed;  well nourished. HEAD:  Normocephalic, atraumatic. EYES:  EOMI. Non-icteric sclera. Normal conjunctiva. ENTM:  Nose:  no rhinorrhea. Throat:  no erythema or exudate, mucous membranes moist.  NECK:  No JVD. Supple  RESPIRATORY:  Chest clear, equal breath sounds, good air movement. CARDIOVASCULAR:  Regular rate and rhythm. No murmurs, rubs, or gallops. GI:  Normal bowel sounds, abdomen soft and non-tender. No rebound or guarding. BACK:  Non-tender.   UPPER EXT:  Normal inspection. LOWER EXT:  No edema, no calf tenderness. Distal pulses intact. NEURO:  Moves all four extremities. Normal motor exam and sensation in all four extremities. Normal CN II-XII exam.  Normal bilateral finger-to-nose exam.     SKIN:  No rashes;  Normal for age. PSYCH:  Alert and normal affect. ED COURSE:      Diagnostic Study Results     Abnormal lab results from this emergency department encounter:  Labs Reviewed   METABOLIC PANEL, BASIC - Abnormal; Notable for the following components:       Result Value    Potassium 5.6 (*)     Chloride 109 (*)     BUN 33 (*)     BUN/Creatinine ratio 32 (*)     GFR est non-AA 56 (*)     Calcium 8.4 (*)     All other components within normal limits   CBC WITH AUTOMATED DIFF   CARDIAC PANEL,(CK, CKMB & TROPONIN)       Lab values for this patient within approximately the last 12 hours:  Recent Results (from the past 12 hour(s))   CBC WITH AUTOMATED DIFF    Collection Time: 07/03/19 11:30 AM   Result Value Ref Range    WBC 6.4 4.6 - 13.2 K/uL    RBC 4.61 4.20 - 5.30 M/uL    HGB 13.0 12.0 - 16.0 g/dL    HCT 41.0 35.0 - 45.0 %    MCV 88.9 74.0 - 97.0 FL    MCH 28.2 24.0 - 34.0 PG    MCHC 31.7 31.0 - 37.0 g/dL    RDW 12.9 11.6 - 14.5 %    PLATELET 490 937 - 534 K/uL    MPV 10.7 9.2 - 11.8 FL    NEUTROPHILS 55 40 - 73 %    LYMPHOCYTES 36 21 - 52 %    MONOCYTES 6 3 - 10 %    EOSINOPHILS 3 0 - 5 %    BASOPHILS 0 0 - 2 %    ABS. NEUTROPHILS 3.6 1.8 - 8.0 K/UL    ABS. LYMPHOCYTES 2.3 0.9 - 3.6 K/UL    ABS. MONOCYTES 0.4 0.05 - 1.2 K/UL    ABS. EOSINOPHILS 0.2 0.0 - 0.4 K/UL    ABS.  BASOPHILS 0.0 0.0 - 0.1 K/UL    DF AUTOMATED     METABOLIC PANEL, BASIC    Collection Time: 07/03/19 11:30 AM   Result Value Ref Range    Sodium 142 136 - 145 mmol/L    Potassium 5.6 (H) 3.5 - 5.5 mmol/L    Chloride 109 (H) 100 - 108 mmol/L    CO2 27 21 - 32 mmol/L    Anion gap 6 3.0 - 18 mmol/L    Glucose 80 74 - 99 mg/dL    BUN 33 (H) 7.0 - 18 MG/DL    Creatinine 1.03 0.6 - 1.3 MG/DL BUN/Creatinine ratio 32 (H) 12 - 20      GFR est AA >60 >60 ml/min/1.73m2    GFR est non-AA 56 (L) >60 ml/min/1.73m2    Calcium 8.4 (L) 8.5 - 10.1 MG/DL   CARDIAC PANEL,(CK, CKMB & TROPONIN)    Collection Time: 07/03/19 11:30 AM   Result Value Ref Range    CK 93 26 - 192 U/L    CK - MB <1.0 <3.6 ng/ml    CK-MB Index  0.0 - 4.0 %     CALCULATION NOT PERFORMED WHEN RESULT IS BELOW LINEAR LIMIT    Troponin-I, QT <0.02 0.0 - 0.045 NG/ML   EKG, 12 LEAD, INITIAL    Collection Time: 07/03/19 11:37 AM   Result Value Ref Range    Ventricular Rate 68 BPM    Atrial Rate 68 BPM    P-R Interval 152 ms    QRS Duration 142 ms    Q-T Interval 444 ms    QTC Calculation (Bezet) 472 ms    Calculated P Axis 71 degrees    Calculated R Axis -67 degrees    Calculated T Axis 53 degrees    Diagnosis       Poor data quality, interpretation may be adversely affected  Normal sinus rhythm  Left axis deviation  Left bundle branch block  Abnormal ECG  When compared with ECG of 14-APR-2019 20:49,  premature ventricular complexes are no longer present  Left bundle branch block is now present         Radiologist and cardiologist interpretations if available at time of this note:  No results found. Medication(s) ordered for patient during this emergency visit encounter:  Medications   acetaminophen (TYLENOL) tablet 1,000 mg (has no administration in time range)   ibuprofen (MOTRIN) tablet 800 mg (has no administration in time range)       Medical Decision Making     I am the first provider for this patient. I reviewed the vital signs, available nursing notes, past medical history, past surgical history, family history and social history. Vital Signs:  Reviewed the patient's vital signs. Initial EKG interpretation by attending emergency physician: Normal sinus rhythm about 70 bpm with left bundle branch block. [place] Syncope Rule   C:  Congestive Heart Failure history - Yes.   H:  Hematocrit <30% - no.   E: Electrocardiogram abnormal - No.  S:  Shortness of Breath - No.  S:  Systolic Blood Pressure <10 mmHg at triage - no      The patient is positive for any of the CHESS syncope criteria. Despite this, the patient has had prior work-up for syncope like she has had today. Prior I am comfortable with patient being discharged home. DIAGNOSIS:  1. Syncope     PLAN:    1. Return if worse, any new symptoms, or if the syncope recurs. 2. You must follow up with your primary doctor and/or cardiologist in the next 1-2 days. Patient is improved, resting quietly and comfortably. The patient will be discharged home. Go to  The patient was reassured that these symptoms do not appear to represent a serious or life threatening condition at this time. Warning signs of worsening condition were discussed and understood by the patient. Based on patient's age, coexisting illness, exam, and the results of this ED evaluation, the decision to treat as an outpatient was made. Based on the information available at time of discharge, acute pathology requiring immediate intervention was deemed relative unlikely. While it is impossible to completely exclude the possibility of underlying serious disease or worsening of condition, I feel the relative likelihood is extremely low. I discussed this uncertainty with the patient, who understood ED evaluation and treatment and felt comfortable with the outpatient treatment plan. All questions regarding care, test results, and follow up were answered. The patient is stable and appropriate to discharge. They understand that they should return to the emergency department for any new or worsening symptoms. I stressed the importance of follow up for repeat assessment and possibly further evaluation/treatment. Dictation disclaimer:  Please note that this dictation was completed with [employer], the [employer] voice recognition software.   Quite often unanticipated grammatical, syntax, homophones, and other interpretive errors are inadvertently transcribed by the computer software. Please disregard these errors. Please excuse any errors that have escaped final proofreading. Coding Diagnoses     Clinical Impression:   1. Syncope and collapse        Disposition     Disposition:  Home. Hesham Kidd M.D. DAVON Board Certified Emergency Physician    Provider Attestation:  If a scribe was utilized in generation of this patient record, I personally performed the services described in the documentation, reviewed the documentation, as recorded by the scribe in my presence, and it accurately records the patient's history of presenting illness, review of systems, patient physical examination, and procedures performed by me as the attending physician. Hesham Kidd M.D.   DAVON Board Certified Emergency Physician  7/3/2019.  12:02 PM

## 2019-07-03 NOTE — DISCHARGE INSTRUCTIONS
PLAN:    1. Return if worse, any new symptoms, or if the syncope recurs. 2. You must follow up with your primary doctor and/or cardiologist in the next 1-2 days. Patient Education        Fainting: Care Instructions  Your Care Instructions    When you faint, or pass out, you lose consciousness for a short time. A brief drop in blood flow to the brain often causes it. When you fall or lie down, more blood flows to your brain and you regain consciousness. Emotional stress, pain, or overheating--especially if you have been standing--can make you faint. In these cases, fainting is usually not serious. But fainting can be a sign of a more serious problem. Your doctor may want you to have more tests to rule out other causes. The treatment you need depends on the reason why you fainted. The doctor has checked you carefully, but problems can develop later. If you notice any problems or new symptoms, get medical treatment right away. Follow-up care is a key part of your treatment and safety. Be sure to make and go to all appointments, and call your doctor if you are having problems. It's also a good idea to know your test results and keep a list of the medicines you take. How can you care for yourself at home? · Drink plenty of fluids to prevent dehydration. If you have kidney, heart, or liver disease and have to limit fluids, talk with your doctor before you increase your fluid intake. When should you call for help? Call 911 anytime you think you may need emergency care. For example, call if:    · You have symptoms of a heart problem. These may include:  ? Chest pain or pressure. ? Severe trouble breathing. ? A fast or irregular heartbeat. ? Lightheadedness or sudden weakness. ? Coughing up pink, foamy mucus. ? Passing out. After you call 911, the  may tell you to chew 1 adult-strength or 2 to 4 low-dose aspirin. Wait for an ambulance.  Do not try to drive yourself.     · You have symptoms of a stroke. These may include:  ? Sudden numbness, tingling, weakness, or loss of movement in your face, arm, or leg, especially on only one side of your body. ? Sudden vision changes. ? Sudden trouble speaking. ? Sudden confusion or trouble understanding simple statements. ? Sudden problems with walking or balance. ? A sudden, severe headache that is different from past headaches.     · You passed out (lost consciousness) again.    Watch closely for changes in your health, and be sure to contact your doctor if:    · You do not get better as expected. Where can you learn more? Go to http://magda-kadie.info/. Enter K837 in the search box to learn more about \"Fainting: Care Instructions. \"  Current as of: September 23, 2018  Content Version: 11.9  © 9747-4077 [employer]. Care instructions adapted under license by [employer] (which disclaims liability or warranty for this information). If you have questions about a medical condition or this instruction, always ask your healthcare professional. [place] [address] any warranty or liability for your use of this information. [employer] Activation    Thank you for requesting access to [employer]. Please follow the instructions below to securely access and download your online medical record. [employer] allows you to send messages to your doctor, view your test results, renew your prescriptions, schedule appointments, and more. How Do I Sign Up? In your internet browser, go to [email or web address]. Click on the First Time User? Click Here link in the Sign In box. You will see the New Member Sign Up page. Enter your [employer] Access Code exactly as it appears below. You will not need to use this code after you´ve completed the sign-up process. If you do not sign up before the expiration date, you must request a new code.     [employer] Access Code: [email or web address]  Expires: 3/28/2019  2:27 PM (This is the date your [employer] access code will )    Enter the last four digits of your Social Security Number (xxxx) and Date of Birth (mm/dd/yyyy) as indicated and click Submit. You will be taken to the next sign-up page. Create a [employer] Therapeuticst ID. This will be your [employer] login ID and cannot be changed, so think of one that is secure and easy to remember. Create a [employer] password. You can change your password at any time. Enter your Password Reset Question and Answer. This can be used at a later time if you forget your password. Enter your e-mail address. You will receive e-mail notification when new information is available in [address] Click Sign Up. You can now view and download portions of your medical record. Click the Songdrop link to download a portable copy of your medical information. Additional Information    If you have questions, please visit the Frequently Asked Questions section of the [employer] website at [email or web address] Remember, [employer] is NOT to be used for urgent needs. For medical emergencies, dial 911.

## 2019-07-03 NOTE — ED NOTES
Luis F Knowles from crisis stabalization called and informed patient will be discharged.  Per Luis F Knowles, they will arrange transport
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
## 2019-07-04 LAB
ATRIAL RATE: 68 BPM
CALCULATED P AXIS, ECG09: 71 DEGREES
CALCULATED R AXIS, ECG10: -67 DEGREES
CALCULATED T AXIS, ECG11: 53 DEGREES
DIAGNOSIS, 93000: NORMAL
P-R INTERVAL, ECG05: 152 MS
Q-T INTERVAL, ECG07: 444 MS
QRS DURATION, ECG06: 142 MS
QTC CALCULATION (BEZET), ECG08: 472 MS
VENTRICULAR RATE, ECG03: 68 BPM

## 2019-07-05 ENCOUNTER — APPOINTMENT (OUTPATIENT)
Dept: CT IMAGING | Age: 54
End: 2019-07-05
Attending: EMERGENCY MEDICINE
Payer: MEDICAID

## 2019-07-05 ENCOUNTER — HOSPITAL ENCOUNTER (EMERGENCY)
Age: 54
Discharge: HOME | End: 2019-07-05
Attending: EMERGENCY MEDICINE | Admitting: EMERGENCY MEDICINE
Payer: MEDICAID

## 2019-07-05 VITALS
OXYGEN SATURATION: 100 % | HEART RATE: 75 BPM | WEIGHT: 204 LBS | BODY MASS INDEX: 41.12 KG/M2 | RESPIRATION RATE: 16 BRPM | TEMPERATURE: 98.2 F | DIASTOLIC BLOOD PRESSURE: 93 MMHG | HEIGHT: 59 IN | SYSTOLIC BLOOD PRESSURE: 207 MMHG

## 2019-07-05 DIAGNOSIS — R41.82 ALTERED MENTAL STATUS, UNSPECIFIED ALTERED MENTAL STATUS TYPE: Primary | ICD-10-CM

## 2019-07-05 DIAGNOSIS — I10 HYPERTENSION, UNSPECIFIED TYPE: ICD-10-CM

## 2019-07-05 DIAGNOSIS — R55 SYNCOPE AND COLLAPSE: ICD-10-CM

## 2019-07-05 DIAGNOSIS — I44.7 LBBB (LEFT BUNDLE BRANCH BLOCK): ICD-10-CM

## 2019-07-05 LAB
ANION GAP SERPL CALC-SCNC: 7 MMOL/L (ref 3–18)
BASOPHILS # BLD: 0 K/UL (ref 0–0.1)
BASOPHILS NFR BLD: 0 % (ref 0–2)
BUN SERPL-MCNC: 22 MG/DL (ref 7–18)
BUN/CREAT SERPL: 22 (ref 12–20)
CALCIUM SERPL-MCNC: 8.8 MG/DL (ref 8.5–10.1)
CHLORIDE SERPL-SCNC: 108 MMOL/L (ref 100–108)
CK MB CFR SERPL CALC: NORMAL % (ref 0–4)
CK MB SERPL-MCNC: <1 NG/ML (ref 5–25)
CK SERPL-CCNC: 59 U/L (ref 26–192)
CO2 SERPL-SCNC: 28 MMOL/L (ref 21–32)
CREAT SERPL-MCNC: 0.98 MG/DL (ref 0.6–1.3)
DIFFERENTIAL METHOD BLD: NORMAL
EOSINOPHIL # BLD: 0.2 K/UL (ref 0–0.4)
EOSINOPHIL NFR BLD: 2 % (ref 0–5)
ERYTHROCYTE [DISTWIDTH] IN BLOOD BY AUTOMATED COUNT: 12.7 % (ref 11.6–14.5)
GLUCOSE BLD STRIP.AUTO-MCNC: 100 MG/DL (ref 70–110)
GLUCOSE SERPL-MCNC: 102 MG/DL (ref 74–99)
HCT VFR BLD AUTO: 44 % (ref 35–45)
HGB BLD-MCNC: 14.3 G/DL (ref 12–16)
LYMPHOCYTES # BLD: 2.8 K/UL (ref 0.9–3.6)
LYMPHOCYTES NFR BLD: 37 % (ref 21–52)
MCH RBC QN AUTO: 28.2 PG (ref 24–34)
MCHC RBC AUTO-ENTMCNC: 32.5 G/DL (ref 31–37)
MCV RBC AUTO: 86.8 FL (ref 74–97)
MONOCYTES # BLD: 0.4 K/UL (ref 0.05–1.2)
MONOCYTES NFR BLD: 5 % (ref 3–10)
NEUTS SEG # BLD: 4.2 K/UL (ref 1.8–8)
NEUTS SEG NFR BLD: 56 % (ref 40–73)
PLATELET # BLD AUTO: 202 K/UL (ref 135–420)
PMV BLD AUTO: 10.8 FL (ref 9.2–11.8)
POTASSIUM SERPL-SCNC: 4.5 MMOL/L (ref 3.5–5.5)
RBC # BLD AUTO: 5.07 M/UL (ref 4.2–5.3)
SODIUM SERPL-SCNC: 143 MMOL/L (ref 136–145)
TROPONIN I SERPL-MCNC: <0.02 NG/ML (ref 0–0.04)
WBC # BLD AUTO: 7.6 K/UL (ref 4.6–13.2)

## 2019-07-05 PROCEDURE — 99285 EMERGENCY DEPT VISIT HI MDM: CPT

## 2019-07-05 PROCEDURE — 82550 ASSAY OF CK (CPK): CPT

## 2019-07-05 PROCEDURE — 80048 BASIC METABOLIC PNL TOTAL CA: CPT

## 2019-07-05 PROCEDURE — 85025 COMPLETE CBC W/AUTO DIFF WBC: CPT

## 2019-07-05 PROCEDURE — 82962 GLUCOSE BLOOD TEST: CPT

## 2019-07-05 PROCEDURE — 70450 CT HEAD/BRAIN W/O DYE: CPT

## 2019-07-05 RX ORDER — AMLODIPINE BESYLATE 10 MG/1
10 TABLET ORAL DAILY
Qty: 30 TAB | Refills: 0 | Status: SHIPPED | OUTPATIENT
Start: 2019-07-05 | End: 2020-06-22

## 2019-07-05 RX ORDER — FLUTICASONE FUROATE AND VILANTEROL 200; 25 UG/1; UG/1
1 POWDER RESPIRATORY (INHALATION) DAILY
COMMUNITY
End: 2020-09-14 | Stop reason: SDUPTHER

## 2019-07-05 RX ORDER — LISINOPRIL 20 MG/1
20 TABLET ORAL DAILY
COMMUNITY
End: 2020-03-26
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
## 2019-07-05 NOTE — ED NOTES
Patient went directly to CT on ems stretcher. Patient was \"unreaponsive\"   on arrival to the ED, per EMS the patient was able to ambulate to the stretcher. Patient does have a positive gag reflex.

## 2019-07-05 NOTE — PROGRESS NOTES
Case Management consult noted for \"manage pt's htn and psychiatric facility disposition. \" Pt to ED today from a drug/ alcohol rehab facility, not a behavioral health facility. If ED provider feels that pt may require psychiatric hospitalization, a tele psych evaluation must be ordered. Pt is insured with an assigned PCP and should follow up with her PCP or medical provider at her drug/ alcohol rehab center for management of her hypertension. No Case Management needs at this time.        Joe Corral RN, BSN, ACM  Outcomes Manager  [phone] (phone)

## 2019-07-05 NOTE — ED NOTES
Tele neurologist in the room with the patient. Patient states she has not been \"normal\" for the past week. States today she was not able to wake up, unable to move her arms, unable to open her eyes. After the CT, the patient sat up and asked how she had gotten here.

## 2019-07-05 NOTE — ED TRIAGE NOTES
Patient told registration \"I do not remember coming here, and I am ready to go\", states \"I feel better\"

## 2019-07-05 NOTE — ED PROVIDER NOTES
EMERGENCY DEPARTMENT HISTORY AND PHYSICAL EXAM      Date: 7/5/2019  Patient Name: Danyel Moses    History of Presenting Illness     Chief Complaint   Patient presents with    Headache    Hypertension       History Provided By: Patient    HPI/Chief Complaint: (Context):who presents with syncope episode. Patient is a psychiatric facility  Patient states that she could not talk for 10 minutes felt she could not move and subsequently got the strength to do so and told his psychiatrist that she went to go to the hospital  She has been having uncontrolled blood pressure she has been having a headache for a month acute on chronic frontal  Patient with no chest pain shortness of breath no palpitations no focal arm or leg weakness. Patient with patient arrived initially she did have confusion decreased mental status minimal movement on all the extremities. But subsequently patient is completely awake alert oriented no distress  She is answering all the questions currently has no complaints currently. I discussed the patient's information on arrival with psychiatrist who stated just as the patient has that she was unresponsive this is happened before. Patient denies any chest pain shortness of breath exertional symptoms no peripheral edema that is appreciated.    ----------------------  Triage note reviewed:  Significantly elevated blood pressure  On triage vitals. Patient with no known allergies  Patient's home medication include albuterol lisinopril clonidine  Patient's medical history includes COPD, asthma, hypertension, CHF  Outpatient social history includes smoking and no significant alcohol use only social.      PCP: None    Current Outpatient Medications   Medication Sig Dispense Refill    fluticasone furoate-vilanterol (BREO ELLIPTA) 200-25 mcg/dose inhaler Take 1 Puff by inhalation daily.  lisinopril (PRINIVIL, ZESTRIL) 20 mg tablet Take 20 mg by mouth daily.       amLODIPine (NORVASC) 10 mg tablet Take 1 Tab by mouth daily. 30 Tab 0    tiotropium (SPIRIVA) 18 mcg inhalation capsule Take 1 Cap by inhalation daily. 30 Cap 0    butalbital-acetaminophen-caff (FIORICET) -40 mg per capsule Take 1 Cap by mouth every four (4) hours as needed for Pain. 10 Cap 0    albuterol (PROAIR HFA) 90 mcg/actuation inhaler Take 2 Puffs by inhalation every four (4) hours as needed for Wheezing. 1 Inhaler 0    albuterol (PROVENTIL VENTOLIN) 2.5 mg /3 mL (0.083 %) nebulizer solution 3 mL by Nebulization route every four (4) hours as needed for Wheezing. 60 Each 0    arformoterol (BROVANA) 15 mcg/2 mL nebu neb solution 2 mL by Nebulization route two (2) times a day. 60 Vial 0    budesonide (PULMICORT) 0.5 mg/2 mL nbsp 2 mL by Nebulization route two (2) times a day. 60 Each 0    aspirin 81 mg chewable tablet Take 1 Tab by mouth daily. 30 Tab 0    atorvastatin (LIPITOR) 20 mg tablet Take 1 Tab by mouth nightly. 30 Tab 0    cloNIDine HCl (CATAPRES) 0.2 mg tablet Take 1 Tab by mouth two (2) times a day. 60 Tab 0    docusate sodium (COLACE) 100 mg capsule Take 1 Cap by mouth two (2) times a day. 60 Cap 0    escitalopram oxalate (LEXAPRO) 10 mg tablet Take 1 Tab by mouth every evening. 30 Tab 0    metoprolol tartrate (LOPRESSOR) 25 mg tablet Take 0.5 Tabs by mouth every twelve (12) hours. 30 Tab 0    predniSONE (DELTASONE) 10 mg tablet 4 tabs ( 40 mg ) po daily for 3 days, then 2 tabs ( 20 mg ) po daily for 3 days, then 1 tab ( 10 mg ) po daily for 3 days, then stop 21 Tab 0    QUEtiapine (SEROQUEL) 50 mg tablet Take 1 Tab by mouth nightly. 30 Tab 0    roflumilast (DALIRESP) 500 mcg tab tablet Take 1 Tab by mouth daily. 30 Tab 0    OTHER Check CBC, CMP, Mg in 4 days, results to PCP immediately, Diagnosis- asthma 1 Each 0    OTHER Incentive spirometry- use as directed 1 Each 0    naloxone (NARCAN) 4 mg/actuation nasal spray Use 1 spray intranasally, then discard.  Repeat with new spray every 2 min as needed for opioid overdose symptoms, alternating nostrils. 1 Each 0    budesonide-formoterol (SYMBICORT) 160-4.5 mcg/actuation HFAA Take 2 Puffs by inhalation two (2) times a day. 1 Inhaler 1    ALPRAZolam (XANAX) 1 mg tablet Take 1 Tab by mouth two (2) times a day. Max Daily Amount: 2 mg. 20 Tab 0    famotidine (PEPCID) 20 mg tablet Take 1 Tab by mouth daily. 30 Tab 1    montelukast (SINGULAIR) 10 mg tablet Take 1 Tab by mouth nightly. 30 Tab 1       Past History     Past Medical History:  Past Medical History:   Diagnosis Date    Asthma     CHF (congestive heart failure) (HCC)     Chronic obstructive pulmonary disease (HCC)     Endocrine disease     thyroid issues    Gastrointestinal disorder     \"blockage in my stomach\"    Hypertension        Past Surgical History:  History reviewed. No pertinent surgical history. Family History:  History reviewed. No pertinent family history. Social History:  Social History     Tobacco Use    Smoking status: Current Every Day Smoker     Packs/day: 0.50    Smokeless tobacco: Former User   Substance Use Topics    Alcohol use: No     Comment: socially    Drug use: Not Currently     Types: Heroin     Allergies:  No Known Allergies      Review of Systems   Review of Systems   Constitutional: Negative for activity change, fatigue and fever. HENT: Negative for congestion and rhinorrhea. Eyes: Negative for visual disturbance. Respiratory: Negative for shortness of breath. Cardiovascular: Negative for chest pain and palpitations. Gastrointestinal: Negative for abdominal pain, diarrhea, nausea and vomiting. Genitourinary: Negative for dysuria and hematuria. Musculoskeletal: Negative for back pain. Skin: Negative for rash. Neurological: Positive for syncope, weakness and headaches. Negative for dizziness and light-headedness. Psychiatric/Behavioral: Negative for agitation. All other systems reviewed and are negative.       Physical Exam     Physical Exam Constitutional: She is oriented to person, place, and time. She appears well-developed and well-nourished. No distress. HENT:   Head: Normocephalic and atraumatic. Right Ear: External ear normal.   Left Ear: External ear normal.   Nose: Nose normal.   Mouth/Throat: Oropharynx is clear and moist.   Eyes: Pupils are equal, round, and reactive to light. Conjunctivae and EOM are normal. No scleral icterus. Neck: Normal range of motion. Neck supple. No JVD present. No tracheal deviation present. No thyromegaly present. Cardiovascular: Normal rate and regular rhythm. Exam reveals no friction rub. No murmur heard. Pulmonary/Chest: Effort normal and breath sounds normal. No stridor. She exhibits no tenderness. Abdominal: Soft. Bowel sounds are normal. She exhibits no distension. There is no tenderness. There is no rebound and no guarding. Musculoskeletal: Normal range of motion. She exhibits no edema or tenderness. Lymphadenopathy:     She has no cervical adenopathy. Neurological: She is alert and oriented to person, place, and time. She has normal strength. No cranial nerve deficit or sensory deficit. Coordination normal. GCS eye subscore is 4. GCS verbal subscore is 5. GCS motor subscore is 6. No focal neurological deficits normal communication. No cranial nerve deficit that could be appreciated. Normal gait   Skin: Skin is warm and dry. Psychiatric: She has a normal mood and affect. Her behavior is normal. Judgment and thought content normal.   Nursing note and vitals reviewed. Medical Decision Making   I am the first provider for this patient. I reviewed the vital signs, available nursing notes, past medical history, past surgical history, family history and social history. Provider Notes (Medical Decision Making): acute AMS, htn,   patient was seen by psychiatry did code as activation.   Patient denied any chest pain or shortness of breath or abdominal pain patient states she is slowly feels she cannot talk and this is repeat episodes and has been happening for weeks. Patient states that she has a blood pressure does not well controlled. Patient also states that she has been seen twice before for the hypertension. She states her symptoms have started since she stopped using heroin and unclear why her symptoms are under control. Vital Signs-Reviewed the patient's vital signs. Pulse Oximetry Analysis -100% pulse ox, normal    Cardiac Monitor:  Rate/Rhythm:75, sr    EKG: Interpreted by the EP: Number EKG done at 11:37 AM at 68 heart rate, leftward axis and left bundle branch block is appreciated poor R wave progression is appreciated      Vitals:    07/05/19 0943 07/05/19 0955 07/05/19 1014 07/05/19 1110   BP: (!) 219/107 (!) 190/105 (!) 207/93    Pulse: 75      Resp: 18   16   Temp: 98.2 °F (36.8 °C)      SpO2: 100%      Weight: 92.5 kg (204 lb)      Height: 4' 11\" (1.499 m)        Records Reviewed: Nursing Notes    ED Course:    Patient did not want to stay in the emergency department did not want any evaluation completed. She  that stated that she is ready to go and did not want to stay in the emergency department longer. Did discuss her information with the psychiatrist as well. He was comfortable taking her back to the psychiatric facility. She is return to the emergency department any changes or worsens. Dr. Lina Petersen was given this communication as well and if patient develops any symptoms she will be send to the ED otherwise outpatient followup will be given to the patient.           Diagnostic Study Results     Labs -     Recent Results (from the past 12 hour(s))   GLUCOSE, POC    Collection Time: 07/05/19  9:21 AM   Result Value Ref Range    Glucose (POC) 100 70 - 634 mg/dL   METABOLIC PANEL, BASIC    Collection Time: 07/05/19  9:26 AM   Result Value Ref Range    Sodium 143 136 - 145 mmol/L    Potassium 4.5 3.5 - 5.5 mmol/L    Chloride 108 100 - 108 mmol/L    CO2 28 21 - 32 mmol/L    Anion gap 7 3.0 - 18 mmol/L    Glucose 102 (H) 74 - 99 mg/dL    BUN 22 (H) 7.0 - 18 MG/DL    Creatinine 0.98 0.6 - 1.3 MG/DL    BUN/Creatinine ratio 22 (H) 12 - 20      GFR est AA >60 >60 ml/min/1.73m2    GFR est non-AA 59 (L) >60 ml/min/1.73m2    Calcium 8.8 8.5 - 10.1 MG/DL   CBC WITH AUTOMATED DIFF    Collection Time: 07/05/19  9:26 AM   Result Value Ref Range    WBC 7.6 4.6 - 13.2 K/uL    RBC 5.07 4.20 - 5.30 M/uL    HGB 14.3 12.0 - 16.0 g/dL    HCT 44.0 35.0 - 45.0 %    MCV 86.8 74.0 - 97.0 FL    MCH 28.2 24.0 - 34.0 PG    MCHC 32.5 31.0 - 37.0 g/dL    RDW 12.7 11.6 - 14.5 %    PLATELET 437 455 - 550 K/uL    MPV 10.8 9.2 - 11.8 FL    NEUTROPHILS 56 40 - 73 %    LYMPHOCYTES 37 21 - 52 %    MONOCYTES 5 3 - 10 %    EOSINOPHILS 2 0 - 5 %    BASOPHILS 0 0 - 2 %    ABS. NEUTROPHILS 4.2 1.8 - 8.0 K/UL    ABS. LYMPHOCYTES 2.8 0.9 - 3.6 K/UL    ABS. MONOCYTES 0.4 0.05 - 1.2 K/UL    ABS. EOSINOPHILS 0.2 0.0 - 0.4 K/UL    ABS. BASOPHILS 0.0 0.0 - 0.1 K/UL    DF AUTOMATED     CARDIAC PANEL,(CK, CKMB & TROPONIN)    Collection Time: 07/05/19  9:26 AM   Result Value Ref Range    CK 59 26 - 192 U/L    CK - MB <1.0 <3.6 ng/ml    CK-MB Index  0.0 - 4.0 %     CALCULATION NOT PERFORMED WHEN RESULT IS BELOW LINEAR LIMIT    Troponin-I, QT <0.02 0.0 - 0.045 NG/ML       Radiologic Studies -   CT HEAD WO CONT   Final Result   IMPRESSION:      1. No acute intracranial hemorrhage, mass effect, midline shift, or herniation. No definite CT evidence of acute cortical infarct is seen. Please note that   noncontrast head CT may be normal in early acute infarct. 2. Stable, minimal nonspecific white matter disease likely representing chronic   small vessel changes. CRITICAL RESULT:     These critical results on this CODE S patient were directly communicated to Dr. Ty Kirby on 7/5/2019 9:43 AM.  Results understood and acknowledged.            CT Results  (Last 48 hours)               07/05/19 0939  CT HEAD WO CONT Final result    Impression:  IMPRESSION:       1. No acute intracranial hemorrhage, mass effect, midline shift, or herniation. No definite CT evidence of acute cortical infarct is seen. Please note that   noncontrast head CT may be normal in early acute infarct. 2. Stable, minimal nonspecific white matter disease likely representing chronic   small vessel changes. CRITICAL RESULT:     These critical results on this CODE S patient were directly communicated to Dr. Neva Cushing on 7/5/2019 9:43 AM.  Results understood and acknowledged. Narrative:  EXAM: CT HEAD W/O CONTRAST       INDICATION: Syncope, difficulty speaking       COMPARISON: CT head 6/30/2019       TECHNIQUE: Axial CT imaging of the head was performed without intravenous   contrast.  Additional coronal and sagittal reconstructions were performed. One   or more dose reduction techniques were used on this CT: automated exposure   control, adjustment of the mAs and/or kVp according to patient's size, and   iterative reconstruction techniques. The specific techniques utilized on this CT   exam have been documented in the patient's electronic medical record. Digital   Imaging and Communications in Medicine (DICOM) format image data are available   to nonaffiliated external healthcare facilities or entities on a secure, media   free, reciprocally searchable basis with patient authorization for at least a   12-month period after this study. _______________       FINDINGS:       VENTRICLES/EXTRA-AXIAL SPACES: The ventricles and sulci are normal in their size   and configuration. BRAIN PARENCHYMA: There is no evidence of acute intracranial hemorrhage, mass   effect, midline shift, or herniation. No definite CT evidence of acute cortical   infarct is seen.  A minimal amount of hypodense white matter lesions are seen   within the periventricular and subcortical white matter which are nonspecific,   but likely represent chronic small vessel changes. ORBITS: The visualized orbits are unremarkable. PARANASAL SINUSES: Visualized paranasal sinuses are clear. MASTOID AIR CELLS: Visualized mastoid air cells are clear. OSSEOUS STRUCTURES: No fracture is seen. OTHER: None.         _______________               CXR Results  (Last 48 hours)    None              Discharge     Clinical Impression:   1. Altered mental status, unspecified altered mental status type    2. Hypertension, unspecified type    3. Syncope and collapse    4.  LBBB (left bundle branch block)        Disposition:  Back to psychiatric [facility] facility    It should be noted that I will be the provider of record for this patient  Jana Barrett MD, RDMS, FACEP    Follow-up Information     Follow up With Specialties Details Why Contact Info    None  Call in 1 day Follow Up From Emergency Department None [phone] Patient stated that they have no PCP      [facility] EMERGENCY DEPT Emergency Medicine  If symptoms worsen [address]    Jonathan Downey MD Neurology In 1 day  [address] [facility]  [address] [facility] [phone]            Discharge Medication List as of 7/5/2019 10:53 AM

## 2019-07-05 NOTE — ED TRIAGE NOTES
Last time well was last week, brought in by EMS, states she has a headache and HTN, has been taking her meds, patient in a rehab center for Heroine and [place]

## 2019-07-05 NOTE — DISCHARGE INSTRUCTIONS
Patient Education        Home Blood Pressure Test: About This Test  What is it? A home blood pressure test allows you to keep track of your blood pressure at home. Blood pressure is a measure of the force of blood against the walls of your arteries. Blood pressure readings include two numbers, such as 130/80 (say \"130 over 80\"). The first number is the systolic pressure. The second number is the diastolic pressure. Why is this test done? You may do this test at home to:  · Find out if you have high blood pressure. · Track your blood pressure if you have high blood pressure. · Track how well medicine is working to reduce high blood pressure. · Check how lifestyle changes, such as weight loss and exercise, are affecting blood pressure. How can you prepare for the test?  · Do not use caffeine, tobacco, or medicines known to raise blood pressure (such as nasal decongestant sprays) for at least 30 minutes before taking your blood pressure. · Do not exercise for at least 30 minutes before taking your blood pressure. What happens before the test?  Take your blood pressure while you feel comfortable and relaxed. Sit quietly with both feet on the floor for at least 5 minutes before the test.  What happens during the test?  · Sit with your arm slightly bent and resting on a table so that your upper arm is at the same level as your heart. · Roll up your sleeve or take off your shirt to expose your upper arm. · Wrap the blood pressure cuff around your upper arm so that the lower edge of the cuff is about 1 inch above the bend of your elbow. Proceed with the following steps depending on if you are using an automatic or manual pressure monitor. Automatic blood pressure monitors  · Press the on/off button on the automatic monitor and wait until the ready-to-measure \"heart\" symbol appears next to zero in the display window. · Press the start button. The cuff will inflate and deflate by itself.   · Your blood pressure numbers will appear on the screen. · Write your numbers in your log book, along with the date and time. Manual blood pressure monitors  · Place the earpieces of a stethoscope in your ears, and place the bell of the stethoscope over the artery, just below the cuff. · Close the valve on the rubber inflating bulb. · Squeeze the bulb rapidly with your opposite hand to inflate the cuff until the dial or column of mercury reads about 30 mm Hg higher than your usual systolic pressure. If you do not know your usual pressure, inflate the cuff to 210 mm Hg or until the pulse at your wrist disappears. · Open the pressure valve just slightly by twisting or pressing the valve on the bulb. · As you watch the pressure slowly fall, note the level on the dial at which you first start to hear a pulsing or tapping sound through the stethoscope. This is your systolic blood pressure. · Continue letting the air out slowly. The sounds will become muffled and will finally disappear. Note the pressure when the sounds completely disappear. This is your diastolic blood pressure. Let out all the remaining air. · Write your numbers in your log book, along with the date and time. What else should you know about the test?  It is more accurate to take the average of several readings made throughout the day than to rely on a single reading. It's normal for blood pressure to go up and down throughout the day. Follow-up care is a key part of your treatment and safety. Be sure to make and go to all appointments, and call your doctor if you are having problems. It's also a good idea to keep a list of the medicines you take. Where can you learn more? Go to http://magda-kadie.info/. Enter C427 in the search box to learn more about \"Home Blood Pressure Test: About This Test.\"  Current as of: July 22, 2018  Content Version: 11.9  © 2594-9431 [employer].  Care instructions adapted under license by [facility] (which disclaims liability or warranty for this information). If you have questions about a medical condition or this instruction, always ask your healthcare professional. [address] any warranty or liability for your use of this information. Patient Education        High Blood Pressure: Care Instructions  Overview    It's normal for blood pressure to go up and down throughout the day. But if it stays up, you have high blood pressure. Another name for high blood pressure is hypertension. Despite what a lot of people think, high blood pressure usually doesn't cause headaches or make you feel dizzy or lightheaded. It usually has no symptoms. But it does increase your risk of stroke, heart attack, and other problems. You and your doctor will talk about your risks of these problems based on your blood pressure. Your doctor will give you a goal for your blood pressure. Your goal will be based on your health and your age. Lifestyle changes, such as eating healthy and being active, are always important to help lower blood pressure. You might also take medicine to reach your blood pressure goal.  Follow-up care is a key part of your treatment and safety. Be sure to make and go to all appointments, and call your doctor if you are having problems. It's also a good idea to know your test results and keep a list of the medicines you take. How can you care for yourself at home? Medical treatment  · If you stop taking your medicine, your blood pressure will go back up. You may take one or more types of medicine to lower your blood pressure. Be safe with medicines. Take your medicine exactly as prescribed. Call your doctor if you think you are having a problem with your medicine. · Talk to your doctor before you start taking aspirin every day. Aspirin can help certain people lower their risk of a heart attack or stroke.  But taking aspirin isn't right for everyone, because it can cause serious bleeding. · See your doctor regularly. You may need to see the doctor more often at first or until your blood pressure comes down. · If you are taking blood pressure medicine, talk to your doctor before you take decongestants or anti-inflammatory medicine, such as ibuprofen. Some of these medicines can raise blood pressure. · Learn how to check your blood pressure at home. Lifestyle changes  · Stay at a healthy weight. This is especially important if you put on weight around the waist. Losing even 10 pounds can help you lower your blood pressure. · If your doctor recommends it, get more exercise. Walking is a good choice. Bit by bit, increase the amount you walk every day. Try for at least 30 minutes on most days of the week. You also may want to swim, bike, or do other activities. · Avoid or limit alcohol. Talk to your doctor about whether you can drink any alcohol. · Try to limit how much sodium you eat to less than 2,300 milligrams (mg) a day. Your doctor may ask you to try to eat less than 1,500 mg a day. · Eat plenty of fruits (such as bananas and oranges), vegetables, legumes, whole grains, and low-fat dairy products. · Lower the amount of saturated fat in your diet. Saturated fat is found in animal products such as milk, cheese, and meat. Limiting these foods may help you lose weight and also lower your risk for heart disease. · Do not smoke. Smoking increases your risk for heart attack and stroke. If you need help quitting, talk to your doctor about stop-smoking programs and medicines. These can increase your chances of quitting for good. When should you call for help? Call 911 anytime you think you may need emergency care. This may mean having symptoms that suggest that your blood pressure is causing a serious heart or blood vessel problem. Your blood pressure may be over 180/120.   For example, call 911 if:    · You have symptoms of a heart attack. These may include:  ?  Chest pain or pressure, or a strange feeling in the chest.  ? Sweating. ? Shortness of breath. ? Nausea or vomiting. ? Pain, pressure, or a strange feeling in the back, neck, jaw, or upper belly or in one or both shoulders or arms. ? Lightheadedness or sudden weakness. ? A fast or irregular heartbeat.     · You have symptoms of a stroke. These may include:  ? Sudden numbness, tingling, weakness, or loss of movement in your face, arm, or leg, especially on only one side of your body. ? Sudden vision changes. ? Sudden trouble speaking. ? Sudden confusion or trouble understanding simple statements. ? Sudden problems with walking or balance. ? A sudden, severe headache that is different from past headaches.     · You have severe back or belly pain.    Do not wait until your blood pressure comes down on its own. Get help right away.   Call your doctor now or seek immediate care if:    · Your blood pressure is much higher than normal (such as 180/120 or higher), but you don't have symptoms.     · You think high blood pressure is causing symptoms, such as:  ? Severe headache.  ? Blurry vision.    Watch closely for changes in your health, and be sure to contact your doctor if:    · Your blood pressure measures higher than your doctor recommends at least 2 times. That means the top number is higher or the bottom number is higher, or both.     · You think you may be having side effects from your blood pressure medicine. Where can you learn more? Go to http://magda-kadie.info/. Enter V507 in the search box to learn more about \"High Blood Pressure: Care Instructions. \"  Current as of: July 22, 2018  Content Version: 11.9  © 7544-0183 [email or web address]. Care instructions adapted under license by [employer] (which disclaims liability or warranty for this information).  If you have questions about a medical condition or this instruction, always ask your healthcare professional. [employer] disclaims any warranty or liability for your use of this information. Patient Education        Fainting: Care Instructions  Your Care Instructions    When you faint, or pass out, you lose consciousness for a short time. A brief drop in blood flow to the brain often causes it. When you fall or lie down, more blood flows to your brain and you regain consciousness. Emotional stress, pain, or overheating--especially if you have been standing--can make you faint. In these cases, fainting is usually not serious. But fainting can be a sign of a more serious problem. Your doctor may want you to have more tests to rule out other causes. The treatment you need depends on the reason why you fainted. The doctor has checked you carefully, but problems can develop later. If you notice any problems or new symptoms, get medical treatment right away. Follow-up care is a key part of your treatment and safety. Be sure to make and go to all appointments, and call your doctor if you are having problems. It's also a good idea to know your test results and keep a list of the medicines you take. How can you care for yourself at home? · Drink plenty of fluids to prevent dehydration. If you have kidney, heart, or liver disease and have to limit fluids, talk with your doctor before you increase your fluid intake. When should you call for help? Call 911 anytime you think you may need emergency care. For example, call if:    · You have symptoms of a heart problem. These may include:  ? Chest pain or pressure. ? Severe trouble breathing. ? A fast or irregular heartbeat. ? Lightheadedness or sudden weakness. ? Coughing up pink, foamy mucus. ? Passing out. After you call 911, the  may tell you to chew 1 adult-strength or 2 to 4 low-dose aspirin. Wait for an ambulance. Do not try to drive yourself.     · You have symptoms of a stroke.  These may include:  ? Sudden numbness, tingling, weakness, or loss of movement in your face, arm, or leg, especially on only one side of your body. ? Sudden vision changes. ? Sudden trouble speaking. ? Sudden confusion or trouble understanding simple statements. ? Sudden problems with walking or balance. ? A sudden, severe headache that is different from past headaches.     · You passed out (lost consciousness) again.    Watch closely for changes in your health, and be sure to contact your doctor if:    · You do not get better as expected. Where can you learn more? Go to http://magda-kadie.info/. Enter W251 in the search box to learn more about \"Fainting: Care Instructions. \"  Current as of: September 23, 2018  Content Version: 11.9  © 1998-1740 [employer]. Care instructions adapted under license by [employer] (which disclaims liability or warranty for this information). If you have questions about a medical condition or this instruction, always ask your healthcare professional. [address] any warranty or liability for your use of this information.

## 2019-07-25 ENCOUNTER — HOSPITAL ENCOUNTER (EMERGENCY)
Age: 54
Discharge: HOME | End: 2019-07-25
Attending: EMERGENCY MEDICINE
Payer: MEDICAID

## 2019-07-25 VITALS
WEIGHT: 202 LBS | BODY MASS INDEX: 40.72 KG/M2 | SYSTOLIC BLOOD PRESSURE: 112 MMHG | RESPIRATION RATE: 14 BRPM | HEIGHT: 59 IN | OXYGEN SATURATION: 99 % | DIASTOLIC BLOOD PRESSURE: 83 MMHG | HEART RATE: 74 BPM | TEMPERATURE: 97.7 F
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
## 2019-07-25 DIAGNOSIS — R42 LIGHTHEADEDNESS: ICD-10-CM

## 2019-07-25 LAB
AMPHET UR QL SCN: NEGATIVE
ANION GAP SERPL CALC-SCNC: 4 MMOL/L (ref 3–18)
APPEARANCE UR: CLEAR
ATRIAL RATE: 74 BPM
ATRIAL RATE: 98 BPM
BARBITURATES UR QL SCN: POSITIVE
BASOPHILS # BLD: 0 K/UL (ref 0–0.06)
BASOPHILS # BLD: 0 K/UL (ref 0–0.1)
BASOPHILS NFR BLD: 0 % (ref 0–2)
BASOPHILS NFR BLD: 0 % (ref 0–3)
BENZODIAZ UR QL: NEGATIVE
BILIRUB UR QL: NEGATIVE
BUN SERPL-MCNC: 19 MG/DL (ref 7–18)
BUN/CREAT SERPL: 16 (ref 12–20)
CALCIUM SERPL-MCNC: 8.3 MG/DL (ref 8.5–10.1)
CALCULATED P AXIS, ECG09: 69 DEGREES
CALCULATED P AXIS, ECG09: 72 DEGREES
CALCULATED R AXIS, ECG10: -68 DEGREES
CALCULATED R AXIS, ECG10: -70 DEGREES
CALCULATED T AXIS, ECG11: 71 DEGREES
CALCULATED T AXIS, ECG11: 93 DEGREES
CANNABINOIDS UR QL SCN: NEGATIVE
CHLORIDE SERPL-SCNC: 111 MMOL/L (ref 100–111)
CO2 SERPL-SCNC: 28 MMOL/L (ref 21–32)
COCAINE UR QL SCN: NEGATIVE
COLOR UR: YELLOW
CREAT SERPL-MCNC: 1.21 MG/DL (ref 0.6–1.3)
DIAGNOSIS, 93000: NORMAL
DIAGNOSIS, 93000: NORMAL
DIFFERENTIAL METHOD BLD: ABNORMAL
DIFFERENTIAL METHOD BLD: ABNORMAL
EOSINOPHIL # BLD: 0.1 K/UL (ref 0–0.4)
EOSINOPHIL # BLD: 0.1 K/UL (ref 0–0.4)
EOSINOPHIL NFR BLD: 2 % (ref 0–5)
EOSINOPHIL NFR BLD: 2 % (ref 0–5)
ERYTHROCYTE [DISTWIDTH] IN BLOOD BY AUTOMATED COUNT: 13 % (ref 11.6–14.5)
ERYTHROCYTE [DISTWIDTH] IN BLOOD BY AUTOMATED COUNT: 13 % (ref 11.6–14.5)
ETHANOL SERPL-MCNC: <3 MG/DL (ref 0–3)
GLUCOSE SERPL-MCNC: 122 MG/DL (ref 74–99)
GLUCOSE UR STRIP.AUTO-MCNC: NEGATIVE MG/DL
HCT VFR BLD AUTO: 33.7 % (ref 35–45)
HCT VFR BLD AUTO: 34.1 % (ref 35–45)
HDSCOM,HDSCOM: ABNORMAL
HGB BLD-MCNC: 11.2 G/DL (ref 12–16)
HGB BLD-MCNC: 11.3 G/DL (ref 12–16)
HGB UR QL STRIP: NEGATIVE
KETONES UR QL STRIP.AUTO: NEGATIVE MG/DL
LEUKOCYTE ESTERASE UR QL STRIP.AUTO: NEGATIVE
LYMPHOCYTES # BLD: 0.6 K/UL (ref 0.8–3.5)
LYMPHOCYTES # BLD: 2.5 K/UL (ref 0.9–3.6)
LYMPHOCYTES NFR BLD: 20 % (ref 20–51)
LYMPHOCYTES NFR BLD: 33 % (ref 21–52)
MCH RBC QN AUTO: 27.7 PG (ref 24–34)
MCH RBC QN AUTO: 28 PG (ref 24–34)
MCHC RBC AUTO-ENTMCNC: 32.8 G/DL (ref 31–37)
MCHC RBC AUTO-ENTMCNC: 33.5 G/DL (ref 31–37)
MCV RBC AUTO: 83.4 FL (ref 74–97)
MCV RBC AUTO: 84.2 FL (ref 74–97)
METHADONE UR QL: NEGATIVE
MONOCYTES # BLD: 0 K/UL (ref 0–1)
MONOCYTES # BLD: 0.3 K/UL (ref 0.05–1.2)
MONOCYTES NFR BLD: 1 % (ref 2–9)
MONOCYTES NFR BLD: 5 % (ref 3–10)
NEUTS SEG # BLD: 2.1 K/UL (ref 1.8–8)
NEUTS SEG # BLD: 4.6 K/UL (ref 1.8–8)
NEUTS SEG NFR BLD: 60 % (ref 40–73)
NEUTS SEG NFR BLD: 77 % (ref 42–75)
NITRITE UR QL STRIP.AUTO: NEGATIVE
OPIATES UR QL: NEGATIVE
P-R INTERVAL, ECG05: 162 MS
P-R INTERVAL, ECG05: 164 MS
PCP UR QL: NEGATIVE
PH UR STRIP: 5 [PH] (ref 5–8)
PLATELET # BLD AUTO: 156 K/UL (ref 135–420)
PLATELET # BLD AUTO: 168 K/UL (ref 135–420)
PLATELET COMMENTS,PCOM: ABNORMAL
PMV BLD AUTO: 9.7 FL (ref 9.2–11.8)
PMV BLD AUTO: 9.8 FL (ref 9.2–11.8)
POTASSIUM SERPL-SCNC: 3.7 MMOL/L (ref 3.5–5.5)
PROT UR STRIP-MCNC: NEGATIVE MG/DL
Q-T INTERVAL, ECG07: 408 MS
Q-T INTERVAL, ECG07: 468 MS
QRS DURATION, ECG06: 126 MS
QRS DURATION, ECG06: 144 MS
QTC CALCULATION (BEZET), ECG08: 519 MS
QTC CALCULATION (BEZET), ECG08: 520 MS
RBC # BLD AUTO: 4.04 M/UL (ref 4.2–5.3)
RBC # BLD AUTO: 4.05 M/UL (ref 4.2–5.3)
RBC MORPH BLD: ABNORMAL
SODIUM SERPL-SCNC: 143 MMOL/L (ref 136–145)
SP GR UR REFRACTOMETRY: 1.01 (ref 1–1.03)
UROBILINOGEN UR QL STRIP.AUTO: 0.2 EU/DL (ref 0.2–1)
VENTRICULAR RATE, ECG03: 74 BPM
VENTRICULAR RATE, ECG03: 98 BPM
WBC # BLD AUTO: 2.8 K/UL (ref 4.6–13.2)
WBC # BLD AUTO: 7.6 K/UL (ref 4.6–13.2)

## 2019-07-25 PROCEDURE — 93005 ELECTROCARDIOGRAM TRACING: CPT

## 2019-07-25 PROCEDURE — 80307 DRUG TEST PRSMV CHEM ANLYZR: CPT

## 2019-07-25 PROCEDURE — 74011000250 HC RX REV CODE- 250: Performed by: STUDENT IN AN ORGANIZED HEALTH CARE EDUCATION/TRAINING PROGRAM

## 2019-07-25 PROCEDURE — 99285 EMERGENCY DEPT VISIT HI MDM: CPT

## 2019-07-25 PROCEDURE — 80048 BASIC METABOLIC PNL TOTAL CA: CPT

## 2019-07-25 PROCEDURE — 94640 AIRWAY INHALATION TREATMENT: CPT

## 2019-07-25 PROCEDURE — 81003 URINALYSIS AUTO W/O SCOPE: CPT

## 2019-07-25 PROCEDURE — 85025 COMPLETE CBC W/AUTO DIFF WBC: CPT

## 2019-07-25 RX ORDER — ALBUTEROL SULFATE 2.5 MG/.5ML
2.5 SOLUTION RESPIRATORY (INHALATION)
Status: COMPLETED | OUTPATIENT
Start: 2019-07-25 | End: 2019-07-25

## 2019-07-25 RX ADMIN — ALBUTEROL SULFATE 2.5 MG: 2.5 SOLUTION RESPIRATORY (INHALATION) at 15:45

## 2019-07-25 NOTE — DISCHARGE INSTRUCTIONS
Patient Education        Preventing Falls: Care Instructions  Your Care Instructions    Getting around your home safely can be a challenge if you have injuries or health problems that make it easy for you to fall. Loose rugs and furniture in walkways are among the dangers for many older people who have problems walking or who have poor eyesight. People who have conditions such as arthritis, osteoporosis, or dementia also have to be careful not to fall. You can make your home safer with a few simple measures. Follow-up care is a key part of your treatment and safety. Be sure to make and go to all appointments, and call your doctor if you are having problems. It's also a good idea to know your test results and keep a list of the medicines you take. How can you care for yourself at home? Taking care of yourself  · You may get dizzy if you do not drink enough water. To prevent dehydration, drink plenty of fluids, enough so that your urine is light yellow or clear like water. Choose water and other caffeine-free clear liquids. If you have kidney, heart, or liver disease and have to limit fluids, talk with your doctor before you increase the amount of fluids you drink. · Exercise regularly to improve your strength, muscle tone, and balance. Walk if you can. Swimming may be a good choice if you cannot walk easily. · Have your vision and hearing checked each year or any time you notice a change. If you have trouble seeing and hearing, you might not be able to avoid objects and could lose your balance. · Know the side effects of the medicines you take. Ask your doctor or pharmacist whether the medicines you take can affect your balance. Sleeping pills or sedatives can affect your balance. · Limit the amount of alcohol you drink. Alcohol can impair your balance and other senses. · Ask your doctor whether calluses or corns on your feet need to be removed.  If you wear loose-fitting shoes because of calluses or corns, you can lose your balance and fall. · Talk to your doctor if you have numbness in your feet. Preventing falls at home  · Remove raised doorway thresholds, throw rugs, and clutter. Repair loose carpet or raised areas in the floor. · Move furniture and electrical cords to keep them out of walking paths. · Use nonskid floor wax, and wipe up spills right away, especially on ceramic tile floors. · If you use a walker or cane, put rubber tips on it. If you use crutches, clean the bottoms of them regularly with an abrasive pad, such as steel wool. · Keep your house well lit, especially [place], and outside walkways. Use night-lights in areas such as hallways and bathrooms. Add extra light switches or use remote switches (such as switches that go on or off when you clap your hands) to make it easier to turn lights on if you have to get up during the night. · Install sturdy handrails on stairways. · Move items in your cabinets so that the things you use a lot are on the lower shelves (about waist level). · Keep a cordless phone and a flashlight with new batteries by your bed. If possible, put a phone in each of the main rooms of your house, or carry a cell phone in case you fall and cannot reach a phone. Or, you can wear a device around your neck or wrist. You push a button that sends a signal for help. · Wear low-heeled shoes that fit well and give your feet good support. Use footwear with nonskid soles. Check the heels and soles of your shoes for wear. Repair or replace worn heels or soles. · Do not wear socks without shoes on wood floors. · Walk on the grass when the sidewalks are slippery. If you live in an area that gets snow and ice in the winter, sprinkle salt on slippery steps and sidewalks. Preventing falls in the bath  · Install grab bars and nonskid mats inside and outside your shower or tub and near the toilet and sinks. · Use shower chairs and bath benches.   · Use a hand-held shower head that will allow you to sit while showering. · Get into a tub or shower by putting the weaker leg in first. Get out of a tub or shower with your strong side first.  · Repair loose toilet seats and consider installing a raised toilet seat to make getting on and off the toilet easier. · Keep your bathroom door unlocked while you are in the shower. Where can you learn more? Go to http://magda-kadie.info/. Enter [id number] in the search box to learn more about \"Preventing Falls: Care Instructions. \"  Current as of: March 16, 2018  Content Version: 11.8  © 8066-5263 [employer]. Care instructions adapted under license by [employer] (which disclaims liability or warranty for this information). If you have questions about a medical condition or this instruction, always ask your healthcare professional. [place] [address] any warranty or liability for your use of this information. Patient Education        Lightheadedness or Faintness: Care Instructions  Your Care Instructions  Lightheadedness is a feeling that you are about to faint or \"pass out. \" You do not feel as if you or your surroundings are moving. It is different from vertigo, which is the feeling that you or things around you are spinning or tilting. Lightheadedness usually goes away or gets better when you lie down. If lightheadedness gets worse, it can lead to a fainting spell. It is common to feel lightheaded from time to time. Lightheadedness usually is not caused by a serious problem. It often is caused by a short-lasting drop in blood pressure and blood flow to your head that occurs when you get up too quickly from a seated or lying position. Follow-up care is a key part of your treatment and safety. Be sure to make and go to all appointments, and call your doctor if you are having problems. It's also a good idea to know your test results and keep a list of the medicines you take.   How can you care for yourself at home? · Lie down for 1 or 2 minutes when you feel lightheaded. After lying down, sit up slowly and remain sitting for 1 to 2 minutes before slowly standing up. · Avoid movements, positions, or activities that have made you lightheaded in the past.  · Get plenty of rest, especially if you have a cold or flu, which can cause lightheadedness. · Make sure you drink plenty of fluids, especially if you have a fever or have been sweating. · Do not drive or put yourself and others in danger while you feel lightheaded. When should you call for help? Call 911 anytime you think you may need emergency care. For example, call if:    · You have symptoms of a stroke. These may include:  ? Sudden numbness, tingling, weakness, or loss of movement in your face, arm, or leg, especially on only one side of your body. ? Sudden vision changes. ? Sudden trouble speaking. ? Sudden confusion or trouble understanding simple statements. ? Sudden problems with walking or balance. ? A sudden, severe headache that is different from past headaches.     · You have symptoms of a heart attack. These may include:  ? Chest pain or pressure, or a strange feeling in the chest.  ? Sweating. ? Shortness of breath. ? Nausea or vomiting. ? Pain, pressure, or a strange feeling in the back, neck, jaw, or upper belly or in one or both shoulders or arms. ? Lightheadedness or sudden weakness. ? A fast or irregular heartbeat. After you call 911, the  may tell you to chew 1 adult-strength or 2 to 4 low-dose aspirin. Wait for an ambulance. Do not try to drive yourself.    Watch closely for changes in your health, and be sure to contact your doctor if:    · Your lightheadedness gets worse or does not get better with home care. Where can you learn more? Go to http://magda-kadie.info/. Enter R704 in the search box to learn more about \"Lightheadedness or Faintness: Care Instructions. \"  Current as of: September 23, 2018  Content Version: 12.1  © 0510-6230 [employer]. Care instructions adapted under license by [employer] (which disclaims liability or warranty for this information). If you have questions about a medical condition or this instruction, always ask your healthcare professional. [address] any warranty or liability for your use of this information.

## 2019-07-25 NOTE — ED PROVIDER NOTES
50-year-old female with past medical history of CHF, COPD, bipolar, schizophrenia, opiate abuse disorder presenting with chief complaint of lightheadedness and falls. Patient is a resident at a psychiatric facility, per the patient and facility staff patient has been experiencing lightheadedness and had 2 witnessed falls today. Patient reports a headache for the past 3 days as gradual in onset, similar to prior headaches in quality. Denies any fevers, nuchal rigidity, changes in vision. Endorsing generalized weakness in all extremities and palpitations but denies any chest pains, difficulty breathing, spinning sensation or vertigo. Past Medical History:   Diagnosis Date    Asthma     CHF (congestive heart failure) (HCC)     Chronic obstructive pulmonary disease (HCC)     Endocrine disease     thyroid issues    Gastrointestinal disorder     \"blockage in my stomach\"    Hypertension        History reviewed. No pertinent surgical history. History reviewed. No pertinent family history.     Social History     Socioeconomic History    Marital status: SINGLE     Spouse name: Not on file    Number of children: Not on file    Years of education: Not on file    Highest education level: Not on file   Occupational History    Not on file   Social Needs    Financial resource strain: Not on file    Food insecurity:     Worry: Not on file     Inability: Not on file    Transportation needs:     Medical: Not on file     Non-medical: Not on file   Tobacco Use    Smoking status: Current Every Day Smoker     Packs/day: 0.50    Smokeless tobacco: Former User   Substance and Sexual Activity    Alcohol use: No     Comment: socially    Drug use: Not Currently     Types: Heroin    Sexual activity: Never     Comment: last used 9 years ago   Lifestyle    Physical activity:     Days per week: Not on file     Minutes per session: Not on file    Stress: Not on file   Relationships    Social connections: Talks on phone: Not on file     Gets together: Not on file     Attends Latter day service: Not on file     Active member of club or organization: Not on file     Attends meetings of clubs or organizations: Not on file     Relationship status: Not on file    Intimate partner violence:     Fear of current or ex partner: Not on file     Emotionally abused: Not on file     Physically abused: Not on file     Forced sexual activity: Not on file   Other Topics Concern    Not on file   Social History Narrative    Not on file         ALLERGIES: Patient has no known allergies. Review of Systems   Constitutional: Negative for chills and fever. Respiratory: Negative for shortness of breath. Cardiovascular: Positive for palpitations. Negative for chest pain. Gastrointestinal: Negative for abdominal pain, nausea and vomiting. Genitourinary: Negative for dysuria and hematuria. Musculoskeletal: Negative for neck pain and neck stiffness. Neurological: Positive for weakness and light-headedness. All other systems reviewed and are negative. Vitals:    07/25/19 0951 07/25/19 1010   BP: 115/69    Pulse: (!) 108    Resp: 20    Temp: 97.7 °F (36.5 °C)    SpO2: 99% 99%   Weight: 91.6 kg (202 lb)    Height: 4' 11\" (1.499 m)             Physical Exam   Constitutional: She is oriented to person, place, and time. She appears well-developed and well-nourished. She is sleeping. She is easily aroused. HENT:   Head: Normocephalic and atraumatic. Eyes: Pupils are equal, round, and reactive to light. EOM are normal.   Neck: Normal range of motion. Neck supple. Cardiovascular: Normal rate, regular rhythm, normal heart sounds and intact distal pulses. Pulmonary/Chest: Effort normal and breath sounds normal.   Abdominal: Soft. Bowel sounds are normal. There is no tenderness. Musculoskeletal: Normal range of motion. She exhibits no edema or tenderness.    Neurological: She is alert, oriented to person, place, and time and easily aroused. No cranial nerve deficit. She exhibits normal muscle tone. Coordination normal.   Skin: Skin is warm and dry. Capillary refill takes less than 2 seconds. Nursing note and vitals reviewed. MDM  Number of Diagnoses or Management Options  Accident due to mechanical fall without injury, initial encounter:   Lightheadedness:   Diagnosis management comments: 71-year-old female presenting with chief complaint of lightheadedness and falls. Tachycardic at 108 but otherwise him dynamically normal and no acute distress and tired appearing on appearance but easily arousable. Physical exam significant for PERRL, extraocular movements intact, no facial droop, no cranial nerve deficits, 4 out of 5 strength in bilateral upper and lower extremities that is equal bilaterally, normal heel-to-shin. No nuchal rigidity or fever concerning for meningitis. Caretakers to observe witnessed falls denies any trauma. 12:17 PM  -Daughters at bedside, states that patient has hx of heroin abuse. No change in patient condition. As pt is responsive, not apneic, will hold on narcan at this time. 1:19 PM  Reevaluation the patient says that she is sitting up, conversing with her family is in the room and appears improved from initial presentation. Patient states that she is feeling improved as well still slightly weak generally. Initial CBC showed concern for a white count of 2.8, however repeat showed 7.6, urine without evidence of urinary tract infection,electrolytes grossly within normal limits, UDS positive for barbiturates, repeat EKG continuing to show unchanged left bundle branch morphology    2:19 PM  -Pt states she is feeling much better overall and would like something to eat. Provided with sandwich and ginger ale. Ambulated around the ER without difficulty. States that she has been experiencing tremors and \"shakiness\" since recently starting medication.   Patient states that she thinks the medication was amlodipine. We will provide a outpatient neurology consult follow-up for tremors. Patient is tolerating oral intake, ambulating, alert and oriented x4 has had no new symptoms since presentation. 3:43 PM  As patient was getting dressed for discharge, nursing was concerned for shortness of breath. Evaluated patient at bedside she had a pulse of 87, pulse ox of 99% on room air and is breathing approximately 20 times per minute. On auscultation she had mild expiratory wheezing in all lung fields without decreased breath sounds or crackles. Ordered nebulizer patient states she has a history of COPD and asthma. Symptoms improved after albuterol treatment and provided prescription for albuterol inhaler.           Procedures

## 2019-07-25 NOTE — LETTER
[address] [place] [address] [facility] 
[address] [facility] [id number] [phone] Work/School Note Date: 7/25/2019 To Whom It May concern: 
 
Cesilia Ingram was seen and treated today in the [facility] Emergency Department. She is medically cleared to return to her transition home. Sincerely, Lorri Panchal MD

## 2019-07-26 ENCOUNTER — HOSPITAL ENCOUNTER (EMERGENCY)
Age: 54
Discharge: HOME | End: 2019-07-26
Attending: EMERGENCY MEDICINE
Payer: MEDICAID

## 2019-07-26 ENCOUNTER — APPOINTMENT (OUTPATIENT)
Dept: GENERAL RADIOLOGY | Age: 54
End: 2019-07-26
Attending: STUDENT IN AN ORGANIZED HEALTH CARE EDUCATION/TRAINING PROGRAM
Payer: MEDICAID

## 2019-07-26 VITALS
OXYGEN SATURATION: 100 % | HEART RATE: 101 BPM | TEMPERATURE: 98.9 F | DIASTOLIC BLOOD PRESSURE: 73 MMHG | SYSTOLIC BLOOD PRESSURE: 138 MMHG | RESPIRATION RATE: 25 BRPM

## 2019-07-26 DIAGNOSIS — I10 ESSENTIAL HYPERTENSION: ICD-10-CM

## 2019-07-26 DIAGNOSIS — R51.9 NONINTRACTABLE EPISODIC HEADACHE, UNSPECIFIED HEADACHE TYPE: ICD-10-CM

## 2019-07-26 DIAGNOSIS — J44.9 CHRONIC OBSTRUCTIVE PULMONARY DISEASE, UNSPECIFIED COPD TYPE (HCC): Primary | ICD-10-CM

## 2019-07-26 DIAGNOSIS — R07.9 CHEST PAIN, UNSPECIFIED TYPE: ICD-10-CM

## 2019-07-26 LAB
ANION GAP SERPL CALC-SCNC: 3 MMOL/L (ref 3–18)
ATRIAL RATE: 94 BPM
BASOPHILS # BLD: 0 K/UL (ref 0–0.1)
BASOPHILS NFR BLD: 0 % (ref 0–2)
BNP SERPL-MCNC: 96 PG/ML (ref 0–900)
BUN SERPL-MCNC: 16 MG/DL (ref 7–18)
BUN/CREAT SERPL: 17 (ref 12–20)
CALCIUM SERPL-MCNC: 8.7 MG/DL (ref 8.5–10.1)
CALCULATED P AXIS, ECG09: 81 DEGREES
CALCULATED R AXIS, ECG10: -63 DEGREES
CALCULATED T AXIS, ECG11: 95 DEGREES
CHLORIDE SERPL-SCNC: 114 MMOL/L (ref 100–111)
CK MB CFR SERPL CALC: 1.2 % (ref 0–4)
CK MB SERPL-MCNC: 1 NG/ML (ref 5–25)
CK SERPL-CCNC: 81 U/L (ref 26–192)
CO2 SERPL-SCNC: 27 MMOL/L (ref 21–32)
CREAT SERPL-MCNC: 0.94 MG/DL (ref 0.6–1.3)
DIAGNOSIS, 93000: NORMAL
DIFFERENTIAL METHOD BLD: ABNORMAL
EOSINOPHIL # BLD: 0.2 K/UL (ref 0–0.4)
EOSINOPHIL NFR BLD: 2 % (ref 0–5)
ERYTHROCYTE [DISTWIDTH] IN BLOOD BY AUTOMATED COUNT: 13.1 % (ref 11.6–14.5)
GLUCOSE SERPL-MCNC: 101 MG/DL (ref 74–99)
HCT VFR BLD AUTO: 34.1 % (ref 35–45)
HGB BLD-MCNC: 11.4 G/DL (ref 12–16)
LYMPHOCYTES # BLD: 1.8 K/UL (ref 0.9–3.6)
LYMPHOCYTES NFR BLD: 23 % (ref 21–52)
MCH RBC QN AUTO: 28.2 PG (ref 24–34)
MCHC RBC AUTO-ENTMCNC: 33.4 G/DL (ref 31–37)
MCV RBC AUTO: 84.4 FL (ref 74–97)
MONOCYTES # BLD: 0.4 K/UL (ref 0.05–1.2)
MONOCYTES NFR BLD: 5 % (ref 3–10)
NEUTS SEG # BLD: 5.3 K/UL (ref 1.8–8)
NEUTS SEG NFR BLD: 70 % (ref 40–73)
P-R INTERVAL, ECG05: 156 MS
PLATELET # BLD AUTO: 147 K/UL (ref 135–420)
PMV BLD AUTO: 10.8 FL (ref 9.2–11.8)
POTASSIUM SERPL-SCNC: 4.5 MMOL/L (ref 3.5–5.5)
Q-T INTERVAL, ECG07: 400 MS
QRS DURATION, ECG06: 128 MS
QTC CALCULATION (BEZET), ECG08: 500 MS
RBC # BLD AUTO: 4.04 M/UL (ref 4.2–5.3)
SODIUM SERPL-SCNC: 144 MMOL/L (ref 136–145)
TROPONIN I SERPL-MCNC: <0.02 NG/ML (ref 0–0.04)
VENTRICULAR RATE, ECG03: 94 BPM
WBC # BLD AUTO: 7.6 K/UL (ref 4.6–13.2)

## 2019-07-26 PROCEDURE — 77030029684 HC NEB SM VOL KT MONA -A

## 2019-07-26 PROCEDURE — 96374 THER/PROPH/DIAG INJ IV PUSH: CPT

## 2019-07-26 PROCEDURE — 82550 ASSAY OF CK (CPK): CPT

## 2019-07-26 PROCEDURE — 74011636637 HC RX REV CODE- 636/637: Performed by: STUDENT IN AN ORGANIZED HEALTH CARE EDUCATION/TRAINING PROGRAM

## 2019-07-26 PROCEDURE — 99284 EMERGENCY DEPT VISIT MOD MDM: CPT

## 2019-07-26 PROCEDURE — 71045 X-RAY EXAM CHEST 1 VIEW: CPT

## 2019-07-26 PROCEDURE — 74011000250 HC RX REV CODE- 250: Performed by: STUDENT IN AN ORGANIZED HEALTH CARE EDUCATION/TRAINING PROGRAM

## 2019-07-26 PROCEDURE — 83880 ASSAY OF NATRIURETIC PEPTIDE: CPT

## 2019-07-26 PROCEDURE — 85025 COMPLETE CBC W/AUTO DIFF WBC: CPT

## 2019-07-26 PROCEDURE — 96375 TX/PRO/DX INJ NEW DRUG ADDON: CPT

## 2019-07-26 PROCEDURE — 94640 AIRWAY INHALATION TREATMENT: CPT

## 2019-07-26 PROCEDURE — 80048 BASIC METABOLIC PNL TOTAL CA: CPT

## 2019-07-26 PROCEDURE — 74011250636 HC RX REV CODE- 250/636: Performed by: STUDENT IN AN ORGANIZED HEALTH CARE EDUCATION/TRAINING PROGRAM

## 2019-07-26 PROCEDURE — 93005 ELECTROCARDIOGRAM TRACING: CPT

## 2019-07-26 RX ORDER — PREDNISONE 20 MG/1
40 TABLET ORAL
Status: COMPLETED | OUTPATIENT
Start: 2019-07-26 | End: 2019-07-26

## 2019-07-26 RX ORDER — KETOROLAC TROMETHAMINE 30 MG/ML
15 INJECTION, SOLUTION INTRAMUSCULAR; INTRAVENOUS
Status: COMPLETED | OUTPATIENT
Start: 2019-07-26 | End: 2019-07-26

## 2019-07-26 RX ORDER — DIPHENHYDRAMINE HYDROCHLORIDE 50 MG/ML
25 INJECTION, SOLUTION INTRAMUSCULAR; INTRAVENOUS
Status: COMPLETED | OUTPATIENT
Start: 2019-07-26 | End: 2019-07-26

## 2019-07-26 RX ORDER — QUETIAPINE FUMARATE 50 MG/1
50 TABLET, FILM COATED ORAL
Qty: 30 TAB | Refills: 0 | Status: SHIPPED | OUTPATIENT
Start: 2019-07-26 | End: 2019-07-26 | Stop reason: SDUPTHER

## 2019-07-26 RX ORDER — ACETAMINOPHEN 500 MG
1000 TABLET ORAL
Status: DISCONTINUED | OUTPATIENT
Start: 2019-07-26 | End: 2019-07-26 | Stop reason: HOSPADM

## 2019-07-26 RX ORDER — QUETIAPINE FUMARATE 50 MG/1
150 TABLET, FILM COATED ORAL
Qty: 60 TAB | Refills: 0 | Status: SHIPPED | OUTPATIENT
Start: 2019-07-26 | End: 2019-08-15

## 2019-07-26 RX ORDER — PREDNISONE 50 MG/1
50 TABLET ORAL DAILY
Qty: 3 TAB | Refills: 0 | Status: SHIPPED | OUTPATIENT
Start: 2019-07-26 | End: 2019-07-26

## 2019-07-26 RX ORDER — IPRATROPIUM BROMIDE AND ALBUTEROL SULFATE 2.5; .5 MG/3ML; MG/3ML
3 SOLUTION RESPIRATORY (INHALATION)
Status: COMPLETED | OUTPATIENT
Start: 2019-07-26 | End: 2019-07-26

## 2019-07-26 RX ORDER — PREDNISONE 20 MG/1
40 TABLET ORAL
Qty: 8 TAB | Refills: 0 | Status: SHIPPED | OUTPATIENT
Start: 2019-07-26 | End: 2019-07-30

## 2019-07-26 RX ORDER — PREDNISONE 20 MG/1
60 TABLET ORAL ONCE
Status: DISCONTINUED | OUTPATIENT
Start: 2019-07-26 | End: 2019-07-26

## 2019-07-26 RX ORDER — PROCHLORPERAZINE EDISYLATE 5 MG/ML
10 INJECTION INTRAMUSCULAR; INTRAVENOUS
Status: DISCONTINUED | OUTPATIENT
Start: 2019-07-26 | End: 2019-07-26 | Stop reason: HOSPADM

## 2019-07-26 RX ADMIN — DIPHENHYDRAMINE HYDROCHLORIDE 25 MG: 50 INJECTION INTRAMUSCULAR; INTRAVENOUS at 10:51

## 2019-07-26 RX ADMIN — IPRATROPIUM BROMIDE AND ALBUTEROL SULFATE 3 ML: .5; 3 SOLUTION RESPIRATORY (INHALATION) at 11:50

## 2019-07-26 RX ADMIN — KETOROLAC TROMETHAMINE 15 MG: 30 INJECTION, SOLUTION INTRAMUSCULAR; INTRAVENOUS at 10:51

## 2019-07-26 RX ADMIN — IPRATROPIUM BROMIDE AND ALBUTEROL SULFATE 3 ML: .5; 3 SOLUTION RESPIRATORY (INHALATION) at 10:11

## 2019-07-26 RX ADMIN — PREDNISONE 40 MG: 20 TABLET ORAL at 11:50

## 2019-07-26 NOTE — ED NOTES
Pt complains of Sob. She was here in the hospital yesterday and says that the symptoms have not resolved. MD ordered pt tylenol and she refused. She states that she only wants morphine and percocet. Pt has a hx of HTN, heroin abuse. She is currently at a alf home and her  is at bedside. VSS. Will continue to monitor.

## 2019-07-26 NOTE — DISCHARGE INSTRUCTIONS
Patient Education        Chronic Obstructive Pulmonary Disease (COPD): Care Instructions  Your Care Instructions    Chronic obstructive pulmonary disease (COPD) is a general term for a group of lung diseases, including emphysema and chronic bronchitis. People with COPD have decreased airflow in and out of the lungs, which makes it hard to breathe. The airways also can get clogged with thick mucus. Cigarette smoking is a major cause of COPD. Although there is no cure for COPD, you can slow its progress. Following your treatment plan and taking care of yourself can help you feel better and live longer. Follow-up care is a key part of your treatment and safety. Be sure to make and go to all appointments, and call your doctor if you are having problems. It's also a good idea to know your test results and keep a list of the medicines you take. How can you care for yourself at home?   Staying healthy    · Do not smoke. This is the most important step you can take to prevent more damage to your lungs. If you need help quitting, talk to your doctor about stop-smoking programs and medicines. These can increase your chances of quitting for good.     · Avoid colds and flu. Get a pneumococcal vaccine shot. If you have had one before, ask your doctor whether you need a second dose. Get the flu vaccine every fall. If you must be around people with colds or the flu, wash your hands often.     · Avoid secondhand smoke, air pollution, and high altitudes. Also avoid cold, dry air and hot, humid air. Stay at home with your windows closed when air pollution is bad.    Medicines and oxygen therapy    · Take your medicines exactly as prescribed. Call your doctor if you think you are having a problem with your medicine.     · You may be taking medicines such as:  ? Bronchodilators. These help open your airways and make breathing easier. Bronchodilators are either short-acting (work for 6 to 9 hours) or long-acting (work for 24 hours). You inhale most bronchodilators, so they start to act quickly. Always carry your quick-relief inhaler with you in case you need it while you are away from home. ? Corticosteroids (prednisone, budesonide). These reduce airway inflammation. They come in pill or inhaled form. You must take these medicines every day for them to work well.     · A spacer may help you get more inhaled medicine to your lungs. Ask your doctor or pharmacist if a spacer is right for you. If it is, ask how to use it properly.     · Do not take any vitamins, over-the-counter medicine, or herbal products without talking to your doctor first.     · If your doctor prescribed antibiotics, take them as directed. Do not stop taking them just because you feel better. You need to take the full course of antibiotics.     · Oxygen therapy boosts the amount of oxygen in your blood and helps you breathe easier. Use the flow rate your doctor has recommended, and do not change it without talking to your doctor first.   Activity    · Get regular exercise. Walking is an easy way to get exercise. Start out slowly, and walk a little more each day.     · Pay attention to your breathing. You are exercising too hard if you cannot talk while you are exercising.     · Take short rest breaks when doing household chores and other activities.     · Learn breathing methods--such as breathing through pursed lips--to help you become less short of breath.     · If your doctor has not set you up with a pulmonary rehabilitation program, talk to him or her about whether rehab is right for you. Rehab includes exercise programs, education about your disease and how to manage it, help with diet and other changes, and emotional support. Diet    · Eat regular, healthy meals. Use bronchodilators about 1 hour before you eat to make it easier to eat. Eat several small meals instead of three large ones.  Drink beverages at the end of the meal. Avoid foods that are hard to chew.     · Eat foods that contain protein so that you do not lose muscle mass.     · Talk with your doctor if you gain too much weight or if you lose weight without trying.    Mental health    · Talk to your family, friends, or a therapist about your feelings. It is normal to feel frightened, angry, hopeless, helpless, and even guilty. Talking openly about bad feelings can help you cope. If these feelings last, talk to your doctor. When should you call for help? Call 911 anytime you think you may need emergency care. For example, call if:    · You have severe trouble breathing.    Call your doctor now or seek immediate medical care if:    · You have new or worse trouble breathing.     · You cough up blood.     · You have a fever.    Watch closely for changes in your health, and be sure to contact your doctor if:    · You cough more deeply or more often, especially if you notice more mucus or a change in the color of your mucus.     · You have new or worse swelling in your legs or belly.     · You are not getting better as expected. Where can you learn more? Go to http://magda-kadie.info/. Yoon Allan in the search box to learn more about \"Chronic Obstructive Pulmonary Disease (COPD): Care Instructions. \"  Current as of: September 5, 2018  Content Version: 12.1  © 7769-2039 Healthwise, Incorporated. Care instructions adapted under license by [employer] (which disclaims liability or warranty for this information). If you have questions about a medical condition or this instruction, always ask your healthcare professional. [place] [address] any warranty or liability for your use of this information. Patient Education        Chest Pain: Care Instructions  Your Care Instructions    There are many things that can cause chest pain. Some are not serious and will get better on their own in a few days. But some kinds of chest pain need more testing and treatment.  Your doctor may have recommended a follow-up visit in the next 8 to 12 hours. If you are not getting better, you may need more tests or treatment. Even though your doctor has released you, you still need to watch for any problems. The doctor carefully checked you, but sometimes problems can develop later. If you have new symptoms or if your symptoms do not get better, get medical care right away. If you have worse or different chest pain or pressure that lasts more than 5 minutes or you passed out (lost consciousness), call 911 or seek other emergency help right away. A medical visit is only one step in your treatment. Even if you feel better, you still need to do what your doctor recommends, such as going to all suggested follow-up appointments and taking medicines exactly as directed. This will help you recover and help prevent future problems. How can you care for yourself at home? · Rest until you feel better. · Take your medicine exactly as prescribed. Call your doctor if you think you are having a problem with your medicine. · Do not drive after taking a prescription pain medicine. When should you call for help? Call 911 if:    · You passed out (lost consciousness).     · You have severe difficulty breathing.     · You have symptoms of a heart attack. These may include:  ? Chest pain or pressure, or a strange feeling in your chest.  ? Sweating. ? Shortness of breath. ? Nausea or vomiting. ? Pain, pressure, or a strange feeling in your back, neck, jaw, or upper belly or in one or both shoulders or arms. ? Lightheadedness or sudden weakness. ? A fast or irregular heartbeat. After you call 911, the  may tell you to chew 1 adult-strength or 2 to 4 low-dose aspirin. Wait for an ambulance.  Do not try to drive yourself.    Call your doctor today if:    · You have any trouble breathing.     · Your chest pain gets worse.     · You are dizzy or lightheaded, or you feel like you may faint.     · You are not getting better as expected.     · You are having new or different chest pain. Where can you learn more? Go to http://magda-kadie.info/. Enter A120 in the search box to learn more about \"Chest Pain: Care Instructions. \"  Current as of: September 23, 2018  Content Version: 12.1  © 6884-7509 [employer]. Care instructions adapted under license by [employer] (which disclaims liability or warranty for this information). If you have questions about a medical condition or this instruction, always ask your healthcare professional. [address] any warranty or liability for your use of this information. Patient Education        High Blood Pressure: Care Instructions  Overview    It's normal for blood pressure to go up and down throughout the day. But if it stays up, you have high blood pressure. Another name for high blood pressure is hypertension. Despite what a lot of people think, high blood pressure usually doesn't cause headaches or make you feel dizzy or lightheaded. It usually has no symptoms. But it does increase your risk of stroke, heart attack, and other problems. You and your doctor will talk about your risks of these problems based on your blood pressure. Your doctor will give you a goal for your blood pressure. Your goal will be based on your health and your age. Lifestyle changes, such as eating healthy and being active, are always important to help lower blood pressure. You might also take medicine to reach your blood pressure goal.  Follow-up care is a key part of your treatment and safety. Be sure to make and go to all appointments, and call your doctor if you are having problems. It's also a good idea to know your test results and keep a list of the medicines you take. How can you care for yourself at home? Medical treatment  · If you stop taking your medicine, your blood pressure will go back up.  You may take one or more types of medicine to lower your blood pressure. Be safe with medicines. Take your medicine exactly as prescribed. Call your doctor if you think you are having a problem with your medicine. · Talk to your doctor before you start taking aspirin every day. Aspirin can help certain people lower their risk of a heart attack or stroke. But taking aspirin isn't right for everyone, because it can cause serious bleeding. · See your doctor regularly. You may need to see the doctor more often at first or until your blood pressure comes down. · If you are taking blood pressure medicine, talk to your doctor before you take decongestants or anti-inflammatory medicine, such as ibuprofen. Some of these medicines can raise blood pressure. · Learn how to check your blood pressure at home. Lifestyle changes  · Stay at a healthy weight. This is especially important if you put on weight around the waist. Losing even 10 pounds can help you lower your blood pressure. · If your doctor recommends it, get more exercise. Walking is a good choice. Bit by bit, increase the amount you walk every day. Try for at least 30 minutes on most days of the week. You also may want to swim, bike, or do other activities. · Avoid or limit alcohol. Talk to your doctor about whether you can drink any alcohol. · Try to limit how much sodium you eat to less than 2,300 milligrams (mg) a day. Your doctor may ask you to try to eat less than 1,500 mg a day. · Eat plenty of fruits (such as bananas and oranges), vegetables, legumes, whole grains, and low-fat dairy products. · Lower the amount of saturated fat in your diet. Saturated fat is found in animal products such as milk, cheese, and meat. Limiting these foods may help you lose weight and also lower your risk for heart disease. · Do not smoke. Smoking increases your risk for heart attack and stroke. If you need help quitting, talk to your doctor about stop-smoking programs and medicines.  These can increase your chances of quitting for good. When should you call for help? Call 911 anytime you think you may need emergency care. This may mean having symptoms that suggest that your blood pressure is causing a serious heart or blood vessel problem. Your blood pressure may be over 180/120.   For example, call 911 if:    · You have symptoms of a heart attack. These may include:  ? Chest pain or pressure, or a strange feeling in the chest.  ? Sweating. ? Shortness of breath. ? Nausea or vomiting. ? Pain, pressure, or a strange feeling in the back, neck, jaw, or upper belly or in one or both shoulders or arms. ? Lightheadedness or sudden weakness. ? A fast or irregular heartbeat.     · You have symptoms of a stroke. These may include:  ? Sudden numbness, tingling, weakness, or loss of movement in your face, arm, or leg, especially on only one side of your body. ? Sudden vision changes. ? Sudden trouble speaking. ? Sudden confusion or trouble understanding simple statements. ? Sudden problems with walking or balance. ? A sudden, severe headache that is different from past headaches.     · You have severe back or belly pain.    Do not wait until your blood pressure comes down on its own. Get help right away.   Call your doctor now or seek immediate care if:    · Your blood pressure is much higher than normal (such as 180/120 or higher), but you don't have symptoms.     · You think high blood pressure is causing symptoms, such as:  ? Severe headache.  ? Blurry vision.    Watch closely for changes in your health, and be sure to contact your doctor if:    · Your blood pressure measures higher than your doctor recommends at least 2 times. That means the top number is higher or the bottom number is higher, or both.     · You think you may be having side effects from your blood pressure medicine. Where can you learn more? Go to http://magda-kadie.info/.   Enter X238 in the search box to learn more about \"High Blood Pressure: Care Instructions. \"  Current as of: July 22, 2018  Content Version: 12.1  © 8346-2587 Healthwise, Incorporated. Care instructions adapted under license by [employer] (which disclaims liability or warranty for this information). If you have questions about a medical condition or this instruction, always ask your healthcare professional. [address] any warranty or liability for your use of this information.

## 2019-07-26 NOTE — ED PROVIDER NOTES
58-year-old female with past medical history of congestive heart failure, COPD, hypertension, asthma, heroin abuse disorder presenting with chief complaint of hypertension, headaches and shortness of breath. Patient reports that when she took her blood pressure this morning her systolic was in the 716C patient states that she has had some intermittent shortness of breath for the past 2 to 3 days, without cough or fevers. Reports that she has had intermittent nonradiating chest pressure for the past week that is located in the mid sternum that last approximately 30 minutes and does not occur every day. Denies any exertional or pleuritic symptoms. Denies any associated nausea, vomiting, diaphoresis. In addition she reports that she has had intermittent frontal headaches for the past month. States that her current headache is similar to prior and was gradual in onset and is not associated with any neck pain stiffness or fevers. Denies any changes in vision. Past Medical History:   Diagnosis Date    Asthma     CHF (congestive heart failure) (HCC)     Chronic obstructive pulmonary disease (HCC)     Endocrine disease     thyroid issues    Gastrointestinal disorder     \"blockage in my stomach\"    Hypertension        No past surgical history on file. No family history on file. Social History     Socioeconomic History    Marital status: SINGLE     Spouse name: Not on file    Number of children: Not on file    Years of education: Not on file    Highest education level: Not on file   Occupational History    Not on file   Social Needs    Financial resource strain: Not on file    Food insecurity:     Worry: Not on file     Inability: Not on file    Transportation needs:     Medical: Not on file     Non-medical: Not on file   Tobacco Use    Smoking status: Current Every Day Smoker     Packs/day: 0.50    Smokeless tobacco: Former User   Substance and Sexual Activity    Alcohol use:  No Comment: socially    Drug use: Not Currently     Types: Heroin    Sexual activity: Never     Comment: last used 9 years ago   Lifestyle    Physical activity:     Days per week: Not on file     Minutes per session: Not on file    Stress: Not on file   Relationships    Social connections:     Talks on phone: Not on file     Gets together: Not on file     Attends Pentecostalism service: Not on file     Active member of club or organization: Not on file     Attends meetings of clubs or organizations: Not on file     Relationship status: Not on file    Intimate partner violence:     Fear of current or ex partner: Not on file     Emotionally abused: Not on file     Physically abused: Not on file     Forced sexual activity: Not on file   Other Topics Concern    Not on file   Social History Narrative    Not on file         ALLERGIES: Patient has no known allergies. Review of Systems   Constitutional: Negative for chills and fever. HENT: Negative for postnasal drip. Eyes: Negative for visual disturbance. Respiratory: Positive for chest tightness, shortness of breath and wheezing. Cardiovascular: Negative for chest pain and palpitations. Gastrointestinal: Negative for abdominal pain, nausea and vomiting. Genitourinary: Negative for dysuria and hematuria. Musculoskeletal: Negative for neck pain. Skin: Negative for rash. Neurological: Positive for headaches. All other systems reviewed and are negative. Vitals:    07/26/19 0929   BP: (!) 144/93   Pulse: (!) 104   Resp: 20   Temp: 98.9 °F (37.2 °C)   SpO2: 100%            Physical Exam   Constitutional: She is oriented to person, place, and time. She appears well-developed and well-nourished. HENT:   Head: Normocephalic and atraumatic. Eyes: Pupils are equal, round, and reactive to light. EOM are normal.   Neck: Neck supple. Cardiovascular: Normal rate, regular rhythm and intact distal pulses. Exam reveals no decreased pulses. Pulmonary/Chest: Effort normal. She has no decreased breath sounds. She has wheezes. She exhibits tenderness. Abdominal: Soft. There is no tenderness. Musculoskeletal:        Right lower leg: She exhibits edema. Left lower leg: She exhibits edema. Neurological: She is alert and oriented to person, place, and time. She has normal strength. No cranial nerve deficit or sensory deficit. Coordination and gait normal. GCS eye subscore is 4. GCS verbal subscore is 5. GCS motor subscore is 6. Skin: Skin is warm and dry. Capillary refill takes less than 2 seconds. Psychiatric: She has a normal mood and affect. Her behavior is normal.   Nursing note and vitals reviewed. MDM  Number of Diagnoses or Management Options  Chest pain, unspecified type:   Chronic obstructive pulmonary disease, unspecified COPD type ([facility] [address]):   Essential hypertension:   Nonintractable episodic headache, unspecified headache type:   Diagnosis management comments: 27-year-old female with past medical history of COPD, HFpEF (EF 70%), hypertension, heroin abuse disorder presenting with chief complaint of high blood pressure, headaches, difficulty breathing. Blood pressure of 144/93 with heart rate of 104 on presentation otherwise hemodynamically normal and in no acute distress and nontoxic-appearing on presentation. Physical exam significant for diffuse mild extra Tory wheezing in all lung fields without decreased breath sounds, chest pain reproducible on palpation of the midsternal chest wall, soft nontender nondistended abdomen, no murmurs rubs or gallops auscultated, mild pitting edema to the ankles and bilateral lower extremities. No cranial nerve deficits, 5 out of 5 strength in bilateral upper and lower extremities, intact sensation, normal finger-nose-finger and heel-to-shin movements, normal gait.   I considered ACS, PE, pneumothorax, pneumonia, COPD exacerbation, aortic dissection, meningitis, intracranial hemorrhage. EKG showing normal sinus rhythm, left axis deviation, left bundle branch morphology unchanged from prior EKGs, otherwise normal intervals, no ST segment changes or evidence of Sgarbossa's  Criteria, 1 PVC. Chest x-ray showing no evidence of acute cardiopulmonary processes. Lab work significant for no leukocytosis, no electrolytes abnormalities, undetectable troponin, normal proBNP. HEART score of 3 (age, EKG, risk factors). Upon reevaluation patient states that she is feeling much better. She is currently reapplying her make-up and reading a book in the room. Patient requesting that she have her Seroquel refilled as she has not been able to sleep for the past several days. Will refill her seroquil prescription. Also prescribing a 4-day course of prednisone for possible COPD flare, however I do not see an indication to prescribe antibiotics at this time as there is no evidence of purulent cough, fevers. Wheezing has resolved after 2 DuoNeb's. I provided a albuterol inhaler prescription yesterday the patient was evaluated for a unrelated complaint. Patient was ablating without difficulty, tolerating oral intake, I was hemogram weekly normal, and had no new complaints and all questions answered at time of discharge.          Procedures

## 2019-07-26 NOTE — ED TRIAGE NOTES
Patient arrived to ER for complaints of shortness of breath, hypertension and headache. Patient states this has been going on for about a week. She states she took her blood pressure medications today.

## 2019-09-27 ENCOUNTER — APPOINTMENT (OUTPATIENT)
Dept: GENERAL RADIOLOGY | Age: 54
DRG: 140 | End: 2019-09-27
Attending: NURSE PRACTITIONER
Payer: MEDICAID

## 2019-09-27 ENCOUNTER — HOSPITAL ENCOUNTER (INPATIENT)
Age: 54
LOS: 6 days | Discharge: HOME | DRG: 140 | End: 2019-10-04
Attending: EMERGENCY MEDICINE | Admitting: INTERNAL MEDICINE
Payer: MEDICAID

## 2019-09-27 DIAGNOSIS — J44.1 COPD EXACERBATION (HCC): ICD-10-CM

## 2019-09-27 DIAGNOSIS — J18.9 COMMUNITY ACQUIRED PNEUMONIA OF RIGHT MIDDLE LOBE OF LUNG: Primary | ICD-10-CM

## 2019-09-27 LAB
ALBUMIN SERPL-MCNC: 3.7 G/DL (ref 3.4–5)
ALBUMIN/GLOB SERPL: 1.1 {RATIO} (ref 0.8–1.7)
ALP SERPL-CCNC: 136 U/L (ref 45–117)
ALT SERPL-CCNC: 38 U/L (ref 13–56)
ANION GAP SERPL CALC-SCNC: 5 MMOL/L (ref 3–18)
ARTERIAL PATENCY WRIST A: YES
AST SERPL-CCNC: 68 U/L (ref 10–38)
BASE EXCESS BLD CALC-SCNC: 2 MMOL/L
BASOPHILS # BLD: 0 K/UL (ref 0–0.1)
BASOPHILS NFR BLD: 0 % (ref 0–2)
BDY SITE: ABNORMAL
BILIRUB SERPL-MCNC: 0.3 MG/DL (ref 0.2–1)
BNP SERPL-MCNC: 422 PG/ML (ref 0–900)
BUN SERPL-MCNC: 22 MG/DL (ref 7–18)
BUN/CREAT SERPL: 20 (ref 12–20)
CALCIUM SERPL-MCNC: 8.7 MG/DL (ref 8.5–10.1)
CHLORIDE SERPL-SCNC: 111 MMOL/L (ref 100–111)
CK MB CFR SERPL CALC: 2.2 % (ref 0–4)
CK MB SERPL-MCNC: 1.9 NG/ML (ref 5–25)
CK SERPL-CCNC: 87 U/L (ref 26–192)
CO2 SERPL-SCNC: 32 MMOL/L (ref 21–32)
CREAT SERPL-MCNC: 1.12 MG/DL (ref 0.6–1.3)
DIFFERENTIAL METHOD BLD: ABNORMAL
EOSINOPHIL # BLD: 0.3 K/UL (ref 0–0.4)
EOSINOPHIL NFR BLD: 3 % (ref 0–5)
ERYTHROCYTE [DISTWIDTH] IN BLOOD BY AUTOMATED COUNT: 14.2 % (ref 11.6–14.5)
GAS FLOW.O2 O2 DELIVERY SYS: ABNORMAL L/MIN
GLOBULIN SER CALC-MCNC: 3.3 G/DL (ref 2–4)
GLUCOSE SERPL-MCNC: 99 MG/DL (ref 74–99)
HCO3 BLD-SCNC: 29.9 MMOL/L (ref 22–26)
HCT VFR BLD AUTO: 40.3 % (ref 35–45)
HGB BLD-MCNC: 12.8 G/DL (ref 12–16)
INR PPP: 1 (ref 0.8–1.2)
LYMPHOCYTES # BLD: 2.4 K/UL (ref 0.9–3.6)
LYMPHOCYTES NFR BLD: 22 % (ref 21–52)
MAGNESIUM SERPL-MCNC: 2 MG/DL (ref 1.6–2.6)
MCH RBC QN AUTO: 29.6 PG (ref 24–34)
MCHC RBC AUTO-ENTMCNC: 31.8 G/DL (ref 31–37)
MCV RBC AUTO: 93.1 FL (ref 74–97)
MONOCYTES # BLD: 0.5 K/UL (ref 0.05–1.2)
MONOCYTES NFR BLD: 4 % (ref 3–10)
NEUTS SEG # BLD: 8.1 K/UL (ref 1.8–8)
NEUTS SEG NFR BLD: 71 % (ref 40–73)
O2/TOTAL GAS SETTING VFR VENT: 40 %
PCO2 BLD: 59.6 MMHG (ref 35–45)
PEEP RESPIRATORY: 6 CMH2O
PH BLD: 7.31 [PH] (ref 7.35–7.45)
PIP ISTAT,IPIP: 12
PLATELET # BLD AUTO: 207 K/UL (ref 135–420)
PMV BLD AUTO: 11.3 FL (ref 9.2–11.8)
PO2 BLD: 93 MMHG (ref 80–100)
POTASSIUM SERPL-SCNC: 3.7 MMOL/L (ref 3.5–5.5)
PRESSURE SUPPORT SETTING VENT: 6 CMH2O
PROT SERPL-MCNC: 7 G/DL (ref 6.4–8.2)
PROTHROMBIN TIME: 13.3 SEC (ref 11.5–15.2)
RBC # BLD AUTO: 4.33 M/UL (ref 4.2–5.3)
SAO2 % BLD: 96 % (ref 92–97)
SERVICE CMNT-IMP: ABNORMAL
SODIUM SERPL-SCNC: 148 MMOL/L (ref 136–145)
SPECIMEN TYPE: ABNORMAL
SPONTANEOUS TIMED, IST: YES
TOTAL RESP. RATE, ITRR: 20
TROPONIN I SERPL-MCNC: 0.02 NG/ML (ref 0–0.04)
WBC # BLD AUTO: 11.3 K/UL (ref 4.6–13.2)

## 2019-09-27 PROCEDURE — 82550 ASSAY OF CK (CPK): CPT

## 2019-09-27 PROCEDURE — 84443 ASSAY THYROID STIM HORMONE: CPT

## 2019-09-27 PROCEDURE — 85610 PROTHROMBIN TIME: CPT

## 2019-09-27 PROCEDURE — 83880 ASSAY OF NATRIURETIC PEPTIDE: CPT

## 2019-09-27 PROCEDURE — 36600 WITHDRAWAL OF ARTERIAL BLOOD: CPT

## 2019-09-27 PROCEDURE — 94660 CPAP INITIATION&MGMT: CPT

## 2019-09-27 PROCEDURE — 82803 BLOOD GASES ANY COMBINATION: CPT

## 2019-09-27 PROCEDURE — 85025 COMPLETE CBC W/AUTO DIFF WBC: CPT

## 2019-09-27 PROCEDURE — 94640 AIRWAY INHALATION TREATMENT: CPT

## 2019-09-27 PROCEDURE — 74011250636 HC RX REV CODE- 250/636

## 2019-09-27 PROCEDURE — 96365 THER/PROPH/DIAG IV INF INIT: CPT

## 2019-09-27 PROCEDURE — 84439 ASSAY OF FREE THYROXINE: CPT

## 2019-09-27 PROCEDURE — 74011000250 HC RX REV CODE- 250: Performed by: NURSE PRACTITIONER

## 2019-09-27 PROCEDURE — 80053 COMPREHEN METABOLIC PANEL: CPT

## 2019-09-27 PROCEDURE — 99285 EMERGENCY DEPT VISIT HI MDM: CPT

## 2019-09-27 PROCEDURE — 96366 THER/PROPH/DIAG IV INF ADDON: CPT

## 2019-09-27 PROCEDURE — 5A09457 ASSISTANCE WITH RESPIRATORY VENTILATION, 24-96 CONSECUTIVE HOURS, CONTINUOUS POSITIVE AIRWAY PRESSURE: ICD-10-PCS | Performed by: HOSPITALIST

## 2019-09-27 PROCEDURE — 83735 ASSAY OF MAGNESIUM: CPT

## 2019-09-27 PROCEDURE — 71045 X-RAY EXAM CHEST 1 VIEW: CPT

## 2019-09-27 RX ORDER — MAGNESIUM SULFATE HEPTAHYDRATE 40 MG/ML
2 INJECTION, SOLUTION INTRAVENOUS ONCE
Status: COMPLETED | OUTPATIENT
Start: 2019-09-27 | End: 2019-09-28

## 2019-09-27 RX ORDER — MAGNESIUM SULFATE HEPTAHYDRATE 40 MG/ML
INJECTION, SOLUTION INTRAVENOUS
Status: COMPLETED
Start: 2019-09-27 | End: 2019-09-27

## 2019-09-27 RX ORDER — IPRATROPIUM BROMIDE AND ALBUTEROL SULFATE 2.5; .5 MG/3ML; MG/3ML
3 SOLUTION RESPIRATORY (INHALATION)
Status: COMPLETED | OUTPATIENT
Start: 2019-09-27 | End: 2019-09-27

## 2019-09-27 RX ADMIN — MAGNESIUM SULFATE HEPTAHYDRATE 2 G: 40 INJECTION, SOLUTION INTRAVENOUS at 22:55

## 2019-09-27 RX ADMIN — IPRATROPIUM BROMIDE AND ALBUTEROL SULFATE 3 ML: .5; 3 SOLUTION RESPIRATORY (INHALATION) at 22:45

## 2019-09-27 RX ADMIN — IPRATROPIUM BROMIDE AND ALBUTEROL SULFATE 3 ML: .5; 3 SOLUTION RESPIRATORY (INHALATION) at 23:30

## 2019-09-27 RX ADMIN — MAGNESIUM SULFATE 2 G: 2 INJECTION INTRAVENOUS at 22:55

## 2019-09-27 RX ADMIN — IPRATROPIUM BROMIDE AND ALBUTEROL SULFATE 3 ML: .5; 3 SOLUTION RESPIRATORY (INHALATION) at 23:15

## 2019-09-28 PROBLEM — Z72.0 TOBACCO USE: Status: ACTIVE | Noted: 2019-09-28

## 2019-09-28 PROBLEM — I16.0 HYPERTENSIVE URGENCY: Status: ACTIVE | Noted: 2019-09-28

## 2019-09-28 PROBLEM — J18.9 CAP (COMMUNITY ACQUIRED PNEUMONIA): Status: ACTIVE | Noted: 2019-09-28

## 2019-09-28 PROBLEM — J44.9 COPD (CHRONIC OBSTRUCTIVE PULMONARY DISEASE) (HCC): Status: ACTIVE | Noted: 2019-09-28

## 2019-09-28 LAB
ANION GAP SERPL CALC-SCNC: 7 MMOL/L (ref 3–18)
APPEARANCE UR: ABNORMAL
ATRIAL RATE: 79 BPM
BACTERIA URNS QL MICRO: ABNORMAL /HPF
BASOPHILS # BLD: 0 K/UL (ref 0–0.1)
BASOPHILS NFR BLD: 0 % (ref 0–2)
BILIRUB UR QL: NEGATIVE
BUN SERPL-MCNC: 27 MG/DL (ref 7–18)
BUN/CREAT SERPL: 26 (ref 12–20)
CALCIUM SERPL-MCNC: 8.9 MG/DL (ref 8.5–10.1)
CALCULATED P AXIS, ECG09: 58 DEGREES
CALCULATED R AXIS, ECG10: 48 DEGREES
CALCULATED T AXIS, ECG11: 50 DEGREES
CHLORIDE SERPL-SCNC: 108 MMOL/L (ref 100–111)
CO2 SERPL-SCNC: 28 MMOL/L (ref 21–32)
COLOR UR: YELLOW
CREAT SERPL-MCNC: 1.03 MG/DL (ref 0.6–1.3)
DIAGNOSIS, 93000: NORMAL
DIFFERENTIAL METHOD BLD: ABNORMAL
EOSINOPHIL # BLD: 0 K/UL (ref 0–0.4)
EOSINOPHIL NFR BLD: 0 % (ref 0–5)
EPITH CASTS URNS QL MICRO: ABNORMAL /LPF (ref 0–5)
ERYTHROCYTE [DISTWIDTH] IN BLOOD BY AUTOMATED COUNT: 14 % (ref 11.6–14.5)
EST. AVERAGE GLUCOSE BLD GHB EST-MCNC: NORMAL MG/DL
GLUCOSE SERPL-MCNC: 150 MG/DL (ref 74–99)
GLUCOSE UR STRIP.AUTO-MCNC: NEGATIVE MG/DL
HBA1C MFR BLD: 4.5 % (ref 4.2–5.6)
HCT VFR BLD AUTO: 40.9 % (ref 35–45)
HGB BLD-MCNC: 13 G/DL (ref 12–16)
HGB UR QL STRIP: NEGATIVE
KETONES UR QL STRIP.AUTO: NEGATIVE MG/DL
LACTATE BLD-SCNC: 0.65 MMOL/L (ref 0.4–2)
LEUKOCYTE ESTERASE UR QL STRIP.AUTO: ABNORMAL
LYMPHOCYTES # BLD: 0.4 K/UL (ref 0.9–3.6)
LYMPHOCYTES NFR BLD: 3 % (ref 21–52)
MCH RBC QN AUTO: 29.3 PG (ref 24–34)
MCHC RBC AUTO-ENTMCNC: 31.8 G/DL (ref 31–37)
MCV RBC AUTO: 92.3 FL (ref 74–97)
MONOCYTES # BLD: 0.1 K/UL (ref 0.05–1.2)
MONOCYTES NFR BLD: 1 % (ref 3–10)
NEUTS SEG # BLD: 11.3 K/UL (ref 1.8–8)
NEUTS SEG NFR BLD: 96 % (ref 40–73)
NITRITE UR QL STRIP.AUTO: NEGATIVE
P-R INTERVAL, ECG05: 140 MS
PH UR STRIP: 7.5 [PH] (ref 5–8)
PLATELET # BLD AUTO: 225 K/UL (ref 135–420)
PMV BLD AUTO: 11.8 FL (ref 9.2–11.8)
POTASSIUM SERPL-SCNC: 3.2 MMOL/L (ref 3.5–5.5)
PROT UR STRIP-MCNC: 30 MG/DL
Q-T INTERVAL, ECG07: 396 MS
QRS DURATION, ECG06: 86 MS
QTC CALCULATION (BEZET), ECG08: 454 MS
RBC # BLD AUTO: 4.43 M/UL (ref 4.2–5.3)
RBC #/AREA URNS HPF: ABNORMAL /HPF (ref 0–5)
SODIUM SERPL-SCNC: 143 MMOL/L (ref 136–145)
SP GR UR REFRACTOMETRY: 1.01 (ref 1–1.03)
T4 FREE SERPL-MCNC: 1.1 NG/DL (ref 0.7–1.5)
TSH SERPL DL<=0.05 MIU/L-ACNC: 0.18 UIU/ML (ref 0.36–3.74)
UROBILINOGEN UR QL STRIP.AUTO: 1 EU/DL (ref 0.2–1)
VENTRICULAR RATE, ECG03: 79 BPM
WBC # BLD AUTO: 11.7 K/UL (ref 4.6–13.2)
WBC URNS QL MICRO: ABNORMAL /HPF (ref 0–4)

## 2019-09-28 PROCEDURE — 74011000250 HC RX REV CODE- 250: Performed by: EMERGENCY MEDICINE

## 2019-09-28 PROCEDURE — 87040 BLOOD CULTURE FOR BACTERIA: CPT

## 2019-09-28 PROCEDURE — 77010033678 HC OXYGEN DAILY

## 2019-09-28 PROCEDURE — 85025 COMPLETE CBC W/AUTO DIFF WBC: CPT

## 2019-09-28 PROCEDURE — 94640 AIRWAY INHALATION TREATMENT: CPT

## 2019-09-28 PROCEDURE — 74011250637 HC RX REV CODE- 250/637: Performed by: FAMILY MEDICINE

## 2019-09-28 PROCEDURE — 87077 CULTURE AEROBIC IDENTIFY: CPT

## 2019-09-28 PROCEDURE — 93005 ELECTROCARDIOGRAM TRACING: CPT

## 2019-09-28 PROCEDURE — 94761 N-INVAS EAR/PLS OXIMETRY MLT: CPT

## 2019-09-28 PROCEDURE — 94660 CPAP INITIATION&MGMT: CPT

## 2019-09-28 PROCEDURE — 74011250636 HC RX REV CODE- 250/636: Performed by: NURSE PRACTITIONER

## 2019-09-28 PROCEDURE — 80048 BASIC METABOLIC PNL TOTAL CA: CPT

## 2019-09-28 PROCEDURE — 74011250637 HC RX REV CODE- 250/637: Performed by: INTERNAL MEDICINE

## 2019-09-28 PROCEDURE — 74011250636 HC RX REV CODE- 250/636: Performed by: INTERNAL MEDICINE

## 2019-09-28 PROCEDURE — 83036 HEMOGLOBIN GLYCOSYLATED A1C: CPT

## 2019-09-28 PROCEDURE — 74011250636 HC RX REV CODE- 250/636: Performed by: EMERGENCY MEDICINE

## 2019-09-28 PROCEDURE — 81001 URINALYSIS AUTO W/SCOPE: CPT

## 2019-09-28 PROCEDURE — 65660000000 HC RM CCU STEPDOWN

## 2019-09-28 PROCEDURE — 87186 SC STD MICRODIL/AGAR DIL: CPT

## 2019-09-28 PROCEDURE — 74011000250 HC RX REV CODE- 250: Performed by: INTERNAL MEDICINE

## 2019-09-28 PROCEDURE — 83605 ASSAY OF LACTIC ACID: CPT

## 2019-09-28 RX ORDER — ALBUTEROL SULFATE 0.83 MG/ML
10 SOLUTION RESPIRATORY (INHALATION)
Status: COMPLETED | OUTPATIENT
Start: 2019-09-28 | End: 2019-09-28

## 2019-09-28 RX ORDER — HEPARIN SODIUM 5000 [USP'U]/ML
5000 INJECTION, SOLUTION INTRAVENOUS; SUBCUTANEOUS EVERY 8 HOURS
Status: DISCONTINUED | OUTPATIENT
Start: 2019-09-28 | End: 2019-10-04 | Stop reason: HOSPADM

## 2019-09-28 RX ORDER — GUAIFENESIN 100 MG/5ML
81 LIQUID (ML) ORAL DAILY
Status: DISCONTINUED | OUTPATIENT
Start: 2019-09-28 | End: 2019-10-04 | Stop reason: HOSPADM

## 2019-09-28 RX ORDER — MORPHINE SULFATE 4 MG/ML
4 INJECTION, SOLUTION INTRAMUSCULAR; INTRAVENOUS
Status: DISCONTINUED | OUTPATIENT
Start: 2019-09-28 | End: 2019-09-28

## 2019-09-28 RX ORDER — ALPRAZOLAM 0.5 MG/1
1 TABLET ORAL 2 TIMES DAILY
Status: DISCONTINUED | OUTPATIENT
Start: 2019-09-28 | End: 2019-09-28

## 2019-09-28 RX ORDER — ONDANSETRON 2 MG/ML
4 INJECTION INTRAMUSCULAR; INTRAVENOUS
Status: DISCONTINUED | OUTPATIENT
Start: 2019-09-28 | End: 2019-10-04 | Stop reason: HOSPADM

## 2019-09-28 RX ORDER — ONDANSETRON 2 MG/ML
4 INJECTION INTRAMUSCULAR; INTRAVENOUS
Status: DISPENSED | OUTPATIENT
Start: 2019-09-28 | End: 2019-09-28

## 2019-09-28 RX ORDER — FLUTICASONE FUROATE AND VILANTEROL 200; 25 UG/1; UG/1
1 POWDER RESPIRATORY (INHALATION)
Status: DISCONTINUED | OUTPATIENT
Start: 2019-09-28 | End: 2019-09-29

## 2019-09-28 RX ORDER — LEVOFLOXACIN 5 MG/ML
750 INJECTION, SOLUTION INTRAVENOUS
Status: COMPLETED | OUTPATIENT
Start: 2019-09-28 | End: 2019-09-29

## 2019-09-28 RX ORDER — ONDANSETRON 2 MG/ML
4 INJECTION INTRAMUSCULAR; INTRAVENOUS
Status: DISCONTINUED | OUTPATIENT
Start: 2019-09-28 | End: 2019-09-28 | Stop reason: SDUPTHER

## 2019-09-28 RX ORDER — ATORVASTATIN CALCIUM 20 MG/1
20 TABLET, FILM COATED ORAL
Status: DISCONTINUED | OUTPATIENT
Start: 2019-09-28 | End: 2019-10-04 | Stop reason: HOSPADM

## 2019-09-28 RX ORDER — HYDROCHLOROTHIAZIDE 50 MG/1
25 TABLET ORAL DAILY
COMMUNITY
End: 2020-03-26

## 2019-09-28 RX ORDER — OXYCODONE AND ACETAMINOPHEN 5; 325 MG/1; MG/1
1 TABLET ORAL
Status: DISCONTINUED | OUTPATIENT
Start: 2019-09-28 | End: 2019-09-28 | Stop reason: SDUPTHER

## 2019-09-28 RX ORDER — OXYCODONE AND ACETAMINOPHEN 5; 325 MG/1; MG/1
1-2 TABLET ORAL
Status: DISCONTINUED | OUTPATIENT
Start: 2019-09-28 | End: 2019-10-02

## 2019-09-28 RX ORDER — HEPARIN SODIUM 5000 [USP'U]/ML
5000 INJECTION, SOLUTION INTRAVENOUS; SUBCUTANEOUS EVERY 8 HOURS
Status: DISCONTINUED | OUTPATIENT
Start: 2019-09-28 | End: 2019-09-28

## 2019-09-28 RX ORDER — CLONIDINE HYDROCHLORIDE 0.1 MG/1
0.2 TABLET ORAL 2 TIMES DAILY
Status: DISCONTINUED | OUTPATIENT
Start: 2019-09-28 | End: 2019-10-04 | Stop reason: HOSPADM

## 2019-09-28 RX ORDER — ALPRAZOLAM 0.5 MG/1
1 TABLET ORAL
Status: DISCONTINUED | OUTPATIENT
Start: 2019-09-28 | End: 2019-10-04

## 2019-09-28 RX ORDER — LISINOPRIL 20 MG/1
20 TABLET ORAL DAILY
Status: DISCONTINUED | OUTPATIENT
Start: 2019-09-28 | End: 2019-10-03

## 2019-09-28 RX ORDER — POTASSIUM CHLORIDE 20 MEQ/1
40 TABLET, EXTENDED RELEASE ORAL EVERY 4 HOURS
Status: COMPLETED | OUTPATIENT
Start: 2019-09-28 | End: 2019-09-29

## 2019-09-28 RX ORDER — ESCITALOPRAM OXALATE 10 MG/1
10 TABLET ORAL EVERY EVENING
Status: DISCONTINUED | OUTPATIENT
Start: 2019-09-28 | End: 2019-10-04 | Stop reason: HOSPADM

## 2019-09-28 RX ORDER — FAMOTIDINE 20 MG/1
20 TABLET, FILM COATED ORAL DAILY
Status: DISCONTINUED | OUTPATIENT
Start: 2019-09-28 | End: 2019-10-04 | Stop reason: HOSPADM

## 2019-09-28 RX ORDER — DEXTROSE MONOHYDRATE 100 MG/ML
125-250 INJECTION, SOLUTION INTRAVENOUS AS NEEDED
Status: DISCONTINUED | OUTPATIENT
Start: 2019-09-28 | End: 2019-09-28

## 2019-09-28 RX ORDER — METOPROLOL TARTRATE 25 MG/1
12.5 TABLET, FILM COATED ORAL EVERY 12 HOURS
Status: DISCONTINUED | OUTPATIENT
Start: 2019-09-28 | End: 2019-10-04 | Stop reason: HOSPADM

## 2019-09-28 RX ORDER — DOCUSATE SODIUM 100 MG/1
100 CAPSULE, LIQUID FILLED ORAL
Status: DISCONTINUED | OUTPATIENT
Start: 2019-09-28 | End: 2019-09-28 | Stop reason: SDUPTHER

## 2019-09-28 RX ORDER — SODIUM CHLORIDE 9 MG/ML
75 INJECTION, SOLUTION INTRAVENOUS CONTINUOUS
Status: DISPENSED | OUTPATIENT
Start: 2019-09-29 | End: 2019-09-29

## 2019-09-28 RX ORDER — LEVOFLOXACIN 5 MG/ML
750 INJECTION, SOLUTION INTRAVENOUS EVERY 24 HOURS
Status: DISCONTINUED | OUTPATIENT
Start: 2019-09-29 | End: 2019-10-01

## 2019-09-28 RX ORDER — IPRATROPIUM BROMIDE AND ALBUTEROL SULFATE 2.5; .5 MG/3ML; MG/3ML
3 SOLUTION RESPIRATORY (INHALATION)
Status: DISCONTINUED | OUTPATIENT
Start: 2019-09-28 | End: 2019-10-02

## 2019-09-28 RX ORDER — ACETAMINOPHEN 325 MG/1
650 TABLET ORAL
Status: DISCONTINUED | OUTPATIENT
Start: 2019-09-28 | End: 2019-10-04 | Stop reason: HOSPADM

## 2019-09-28 RX ORDER — AMLODIPINE BESYLATE 10 MG/1
10 TABLET ORAL DAILY
Status: DISCONTINUED | OUTPATIENT
Start: 2019-09-28 | End: 2019-10-04 | Stop reason: HOSPADM

## 2019-09-28 RX ORDER — NALOXONE HYDROCHLORIDE 0.4 MG/ML
0.4 INJECTION, SOLUTION INTRAMUSCULAR; INTRAVENOUS; SUBCUTANEOUS AS NEEDED
Status: DISCONTINUED | OUTPATIENT
Start: 2019-09-28 | End: 2019-10-04 | Stop reason: HOSPADM

## 2019-09-28 RX ORDER — MORPHINE SULFATE 2 MG/ML
2 INJECTION, SOLUTION INTRAMUSCULAR; INTRAVENOUS ONCE
Status: COMPLETED | OUTPATIENT
Start: 2019-09-28 | End: 2019-09-28

## 2019-09-28 RX ORDER — INSULIN LISPRO 100 [IU]/ML
INJECTION, SOLUTION INTRAVENOUS; SUBCUTANEOUS
Status: DISCONTINUED | OUTPATIENT
Start: 2019-09-28 | End: 2019-09-28

## 2019-09-28 RX ORDER — ACETAMINOPHEN 325 MG/1
650 TABLET ORAL
Status: DISCONTINUED | OUTPATIENT
Start: 2019-09-28 | End: 2019-09-28 | Stop reason: SDUPTHER

## 2019-09-28 RX ORDER — NALOXONE HYDROCHLORIDE 0.4 MG/ML
0.4 INJECTION, SOLUTION INTRAMUSCULAR; INTRAVENOUS; SUBCUTANEOUS AS NEEDED
Status: DISCONTINUED | OUTPATIENT
Start: 2019-09-28 | End: 2019-09-28 | Stop reason: SDUPTHER

## 2019-09-28 RX ORDER — MAGNESIUM SULFATE 100 %
4 CRYSTALS MISCELLANEOUS AS NEEDED
Status: DISCONTINUED | OUTPATIENT
Start: 2019-09-28 | End: 2019-09-28

## 2019-09-28 RX ORDER — HYDRALAZINE HYDROCHLORIDE 20 MG/ML
10 INJECTION INTRAMUSCULAR; INTRAVENOUS
Status: DISCONTINUED | OUTPATIENT
Start: 2019-09-28 | End: 2019-10-04 | Stop reason: HOSPADM

## 2019-09-28 RX ORDER — TERBUTALINE SULFATE 1 MG/ML
0.25 INJECTION SUBCUTANEOUS
Status: COMPLETED | OUTPATIENT
Start: 2019-09-28 | End: 2019-09-28

## 2019-09-28 RX ORDER — HYDROCHLOROTHIAZIDE 25 MG/1
25 TABLET ORAL DAILY
Status: DISCONTINUED | OUTPATIENT
Start: 2019-09-29 | End: 2019-10-03

## 2019-09-28 RX ORDER — QUETIAPINE FUMARATE 300 MG/1
300 TABLET, FILM COATED ORAL
COMMUNITY
End: 2020-03-26

## 2019-09-28 RX ORDER — FUROSEMIDE 10 MG/ML
40 INJECTION INTRAMUSCULAR; INTRAVENOUS
Status: COMPLETED | OUTPATIENT
Start: 2019-09-28 | End: 2019-09-28

## 2019-09-28 RX ORDER — DOCUSATE SODIUM 100 MG/1
100 CAPSULE, LIQUID FILLED ORAL
Status: DISCONTINUED | OUTPATIENT
Start: 2019-09-28 | End: 2019-09-28

## 2019-09-28 RX ORDER — DEXTROSE 50 % IN WATER (D50W) INTRAVENOUS SYRINGE
25-50 AS NEEDED
Status: DISCONTINUED | OUTPATIENT
Start: 2019-09-28 | End: 2019-09-28

## 2019-09-28 RX ADMIN — IPRATROPIUM BROMIDE AND ALBUTEROL SULFATE 3 ML: .5; 3 SOLUTION RESPIRATORY (INHALATION) at 10:39

## 2019-09-28 RX ADMIN — ROFLUMILAST 500 MCG: 500 TABLET ORAL at 08:21

## 2019-09-28 RX ADMIN — HEPARIN SODIUM 5000 UNITS: 5000 INJECTION INTRAVENOUS; SUBCUTANEOUS at 12:08

## 2019-09-28 RX ADMIN — FAMOTIDINE 20 MG: 20 TABLET ORAL at 08:21

## 2019-09-28 RX ADMIN — HEPARIN SODIUM 5000 UNITS: 5000 INJECTION INTRAVENOUS; SUBCUTANEOUS at 18:04

## 2019-09-28 RX ADMIN — TERBUTALINE SULFATE 0.25 MG: 1 INJECTION SUBCUTANEOUS at 01:58

## 2019-09-28 RX ADMIN — LISINOPRIL 20 MG: 20 TABLET ORAL at 08:21

## 2019-09-28 RX ADMIN — FUROSEMIDE 40 MG: 10 INJECTION, SOLUTION INTRAMUSCULAR; INTRAVENOUS at 01:56

## 2019-09-28 RX ADMIN — ESCITALOPRAM OXALATE 10 MG: 10 TABLET ORAL at 18:05

## 2019-09-28 RX ADMIN — CLONIDINE HYDROCHLORIDE 0.2 MG: 0.1 TABLET ORAL at 08:21

## 2019-09-28 RX ADMIN — OXYCODONE HYDROCHLORIDE AND ACETAMINOPHEN 2 TABLET: 5; 325 TABLET ORAL at 16:11

## 2019-09-28 RX ADMIN — FLUTICASONE FUROATE AND VILANTEROL TRIFENATATE 1 PUFF: 200; 25 POWDER RESPIRATORY (INHALATION) at 07:51

## 2019-09-28 RX ADMIN — ALPRAZOLAM 1 MG: 0.5 TABLET ORAL at 19:11

## 2019-09-28 RX ADMIN — METHYLPREDNISOLONE SODIUM SUCCINATE 60 MG: 125 INJECTION, POWDER, FOR SOLUTION INTRAMUSCULAR; INTRAVENOUS at 08:53

## 2019-09-28 RX ADMIN — OXYCODONE HYDROCHLORIDE AND ACETAMINOPHEN 2 TABLET: 5; 325 TABLET ORAL at 12:08

## 2019-09-28 RX ADMIN — MORPHINE SULFATE 2 MG: 2 INJECTION, SOLUTION INTRAMUSCULAR; INTRAVENOUS at 01:58

## 2019-09-28 RX ADMIN — LEVOFLOXACIN 750 MG: 5 INJECTION, SOLUTION INTRAVENOUS at 01:58

## 2019-09-28 RX ADMIN — CLONIDINE HYDROCHLORIDE 0.2 MG: 0.1 TABLET ORAL at 03:00

## 2019-09-28 RX ADMIN — OXYCODONE HYDROCHLORIDE AND ACETAMINOPHEN 2 TABLET: 5; 325 TABLET ORAL at 22:58

## 2019-09-28 RX ADMIN — AMLODIPINE BESYLATE 10 MG: 10 TABLET ORAL at 08:22

## 2019-09-28 RX ADMIN — QUETIAPINE 300 MG: 100 TABLET ORAL at 22:30

## 2019-09-28 RX ADMIN — ASPIRIN 81 MG 81 MG: 81 TABLET ORAL at 08:22

## 2019-09-28 RX ADMIN — ATORVASTATIN CALCIUM 20 MG: 20 TABLET, FILM COATED ORAL at 03:00

## 2019-09-28 RX ADMIN — IPRATROPIUM BROMIDE AND ALBUTEROL SULFATE 3 ML: .5; 3 SOLUTION RESPIRATORY (INHALATION) at 23:21

## 2019-09-28 RX ADMIN — METHYLPREDNISOLONE SODIUM SUCCINATE 60 MG: 125 INJECTION, POWDER, FOR SOLUTION INTRAMUSCULAR; INTRAVENOUS at 22:51

## 2019-09-28 RX ADMIN — CLONIDINE HYDROCHLORIDE 0.2 MG: 0.1 TABLET ORAL at 18:04

## 2019-09-28 RX ADMIN — HYDRALAZINE HYDROCHLORIDE 10 MG: 20 INJECTION INTRAMUSCULAR; INTRAVENOUS at 03:11

## 2019-09-28 RX ADMIN — POTASSIUM CHLORIDE 40 MEQ: 20 TABLET, EXTENDED RELEASE ORAL at 22:51

## 2019-09-28 RX ADMIN — HEPARIN SODIUM 5000 UNITS: 5000 INJECTION INTRAVENOUS; SUBCUTANEOUS at 03:15

## 2019-09-28 RX ADMIN — ALBUTEROL SULFATE 10 MG: 2.5 SOLUTION RESPIRATORY (INHALATION) at 01:55

## 2019-09-28 RX ADMIN — METHYLPREDNISOLONE SODIUM SUCCINATE 60 MG: 125 INJECTION, POWDER, FOR SOLUTION INTRAMUSCULAR; INTRAVENOUS at 15:43

## 2019-09-28 RX ADMIN — METOPROLOL TARTRATE 12.5 MG: 25 TABLET ORAL at 03:00

## 2019-09-28 RX ADMIN — ATORVASTATIN CALCIUM 20 MG: 20 TABLET, FILM COATED ORAL at 22:51

## 2019-09-28 NOTE — PROGRESS NOTES
Problem:  Activity Intolerance  Goal: *Oxygen saturation during activity within specified parameters  Outcome: Progressing Towards Goal  Goal: *Able to remain out of bed as prescribed  Outcome: Progressing Towards Goal

## 2019-09-28 NOTE — PROGRESS NOTES
48 Patient refused to have blood glucose taken. Says \"I'm not a diabetic and I'm not gonna start. \"     Also states that she takes 10 mg percocet q 4 hrs outpatient. Is in 10/10 pain and refused 5 mg percocet. Attending notified. 1200 RN came in and tele leads off for second time. Leads replaced on patient and education provided. Patient stating she is stooling when coughing and needed to clean up.

## 2019-09-28 NOTE — ED PROVIDER NOTES
[facility]  Emergency Department Treatment Report        11:50 PM Josi Rinaldi is a 53 y.o. female with a history of hypertension, COPD and continues to smoke cigarettes who presents to ED with a COPD exacerbation shortness of breath hypoxia. Patient was found at home with a O2 sat in the 70s was placed on BiPAP given 1 breathing treatment and Solu-Medrol in the field she comes in on BiPAP. No chest pain was reported patient states she is continuing to smoke no fever has been reported. No other complaints, associated symptoms or modifying factors at this time. PCP: None      The history is provided by the patient. No  was used. Shortness of Breath   This is a recurrent problem. The current episode started 1 to 2 hours ago. The problem has been gradually improving. Associated symptoms include cough and wheezing. Pertinent negatives include no fever, no sputum production, no chest pain, no vomiting, no abdominal pain and no leg swelling. She has tried beta-agonist inhalers for the symptoms. The treatment provided mild relief. She has had prior hospitalizations. She has had prior ED visits. Associated medical issues include asthma, COPD and chronic lung disease. Past Medical History:   Diagnosis Date    Asthma     CHF (congestive heart failure) (HCC)     Chronic obstructive pulmonary disease (HCC)     Endocrine disease     thyroid issues    Gastrointestinal disorder     \"blockage in my stomach\"    Hypertension        No past surgical history on file. No family history on file.     Social History     Socioeconomic History    Marital status: SINGLE     Spouse name: Not on file    Number of children: Not on file    Years of education: Not on file    Highest education level: Not on file   Occupational History    Not on file   Social Needs    Financial resource strain: Not on file    Food insecurity:     Worry: Not on file     Inability: Not on file   [place] Transportation needs:     Medical: Not on file     Non-medical: Not on file   Tobacco Use    Smoking status: Current Every Day Smoker     Packs/day: 0.50    Smokeless tobacco: Former User   Substance and Sexual Activity    Alcohol use: No     Comment: socially    Drug use: Not Currently     Types: Heroin    Sexual activity: Never     Comment: last used 9 years ago   Lifestyle    Physical activity:     Days per week: Not on file     Minutes per session: Not on file    Stress: Not on file   Relationships    Social connections:     Talks on phone: Not on file     Gets together: Not on file     Attends Alevism service: Not on file     Active member of club or organization: Not on file     Attends meetings of clubs or organizations: Not on file     Relationship status: Not on file    Intimate partner violence:     Fear of current or ex partner: Not on file     Emotionally abused: Not on file     Physically abused: Not on file     Forced sexual activity: Not on file   Other Topics Concern    Not on file   Social History Narrative    Not on file         ALLERGIES: Patient has no known allergies. Review of Systems   Constitutional: Negative for fever. Respiratory: Positive for cough, shortness of breath and wheezing. Negative for sputum production, choking and stridor. Cardiovascular: Negative for chest pain and leg swelling. Gastrointestinal: Negative for abdominal pain and vomiting. Neurological: Negative for dizziness. All other systems reviewed and are negative. Vitals:    09/28/19 0114 09/28/19 0115 09/28/19 0116 09/28/19 0117   BP:  188/79     Pulse: 90 87 89 88   Resp: 21 21 23 21   Temp:       SpO2: 97% 97% 96% 96%            Physical Exam   Constitutional: She is oriented to person, place, and time. She appears well-developed and well-nourished. HENT:   Head: Normocephalic and atraumatic. Eyes: Pupils are equal, round, and reactive to light.  Conjunctivae and EOM are normal. Neck: Normal range of motion. Neck supple. Cardiovascular: Normal rate, regular rhythm, normal heart sounds and intact distal pulses. Pulmonary/Chest: No stridor. She is in respiratory distress. She has wheezes. She exhibits no tenderness.   + Moderate wheeze throughout   Abdominal: Soft. Bowel sounds are normal. There is no tenderness. Musculoskeletal: Normal range of motion. Neurological: She is alert and oriented to person, place, and time. She has normal reflexes. Skin: Skin is warm and dry. Capillary refill takes less than 2 seconds. Psychiatric: She has a normal mood and affect. Her behavior is normal. Judgment and thought content normal.   Nursing note and vitals reviewed. MDM  Number of Diagnoses or Management Options  Community acquired pneumonia of right middle lobe of lung ([facility] [address]):   COPD exacerbation [facility]):   Diagnosis management comments: I suspect a COPD exacerbation I will order labs ABG chest x-ray and continue patient on BiPAP at this time. Patient is argumentative and she was cursing at the staff. I encouraged patient to calm down and to let us take care of her. At this time patient is awake alert oriented and not altered. Patient with community-acquired pneumonia right middle lobe infiltrate. COPD exacerbation. Patient treated with Levaquin Solu-Medrol breathing treatments magnesium she is not improved 100% she still is having some congestion and wheezing she will be admitted with BiPAP to telemetry. I discussed this with the hospitalist and he accepted admission.          Amount and/or Complexity of Data Reviewed  Clinical lab tests: ordered and reviewed  Tests in the radiology section of CPT®: ordered and reviewed  Review and summarize past medical records: yes  Independent visualization of images, tracings, or specimens: yes    Risk of Complications, Morbidity, and/or Mortality  Presenting problems: moderate  Diagnostic procedures: moderate  Management options: moderate    Critical Care  Total time providing critical care: < 30 minutes (I have personally provided _35__ minutes of critical care time exclusive of time spent on separately billable procedures. Time includes review of laboratory data, radiology results, discussion with consultants, and monitoring for potential decompensation.  Interventions were performed as documented above  )    Patient Progress  Patient progress: stable         Procedures            Vitals:  Patient Vitals for the past 12 hrs:   Temp Pulse Resp BP SpO2   09/28/19 0117  88 21  96 %   09/28/19 0116  89 23  96 %   09/28/19 0115  87 21 188/79 97 %   09/28/19 0114  90 21  97 %   09/28/19 0113  89 22  97 %   09/28/19 0112  89 22  97 %   09/28/19 0111  90 22  97 %   09/28/19 0110  88 21  97 %   09/28/19 0109  90 23  98 %   09/28/19 0108  89 21  97 %   09/28/19 0100  82 19 197/85 99 %   09/28/19 0055  91 21  99 %   09/28/19 0050  83 20  99 %   09/28/19 0045  83 19 (!) 195/99 100 %   09/28/19 0030  91 21  98 %   09/28/19 0003  93 20  99 %   09/27/19 2330  90 20  100 %   09/27/19 2313  96 26     09/27/19 2258 98.2 °F (36.8 °C) (!) 115 28 (!) 173/99 100 %       Medications ordered:   Medications   albuterol-ipratropium (DUO-NEB) 2.5 MG-0.5 MG/3 ML (has no administration in time range)   levoFLOXacin (LEVAQUIN) 750 mg in D5W IVPB (has no administration in time range)   terbutaline (BRETHINE) injection 0.25 mg (has no administration in time range)   albuterol (PROVENTIL VENTOLIN) nebulizer solution 10 mg (has no administration in time range)   morphine injection 4 mg (has no administration in time range)   ondansetron (ZOFRAN) injection 4 mg (has no administration in time range)   furosemide (LASIX) injection 40 mg (has no administration in time range)   magnesium sulfate 2 g/50 ml IVPB (premix or compounded) (2 g IntraVENous New Bag 9/27/19 7968)         Lab findings:  Recent Results (from the past 12 hour(s))   CBC WITH AUTOMATED DIFF    Collection Time: 09/27/19 10:50 PM   Result Value Ref Range    WBC 11.3 4.6 - 13.2 K/uL    RBC 4.33 4.20 - 5.30 M/uL    HGB 12.8 12.0 - 16.0 g/dL    HCT 40.3 35.0 - 45.0 %    MCV 93.1 74.0 - 97.0 FL    MCH 29.6 24.0 - 34.0 PG    MCHC 31.8 31.0 - 37.0 g/dL    RDW 14.2 11.6 - 14.5 %    PLATELET 360 064 - 624 K/uL    MPV 11.3 9.2 - 11.8 FL    NEUTROPHILS 71 40 - 73 %    LYMPHOCYTES 22 21 - 52 %    MONOCYTES 4 3 - 10 %    EOSINOPHILS 3 0 - 5 %    BASOPHILS 0 0 - 2 %    ABS. NEUTROPHILS 8.1 (H) 1.8 - 8.0 K/UL    ABS. LYMPHOCYTES 2.4 0.9 - 3.6 K/UL    ABS. MONOCYTES 0.5 0.05 - 1.2 K/UL    ABS. EOSINOPHILS 0.3 0.0 - 0.4 K/UL    ABS. BASOPHILS 0.0 0.0 - 0.1 K/UL    DF AUTOMATED     METABOLIC PANEL, COMPREHENSIVE    Collection Time: 09/27/19 10:50 PM   Result Value Ref Range    Sodium 148 (H) 136 - 145 mmol/L    Potassium 3.7 3.5 - 5.5 mmol/L    Chloride 111 100 - 111 mmol/L    CO2 32 21 - 32 mmol/L    Anion gap 5 3.0 - 18 mmol/L    Glucose 99 74 - 99 mg/dL    BUN 22 (H) 7.0 - 18 MG/DL    Creatinine 1.12 0.6 - 1.3 MG/DL    BUN/Creatinine ratio 20 12 - 20      GFR est AA >60 >60 ml/min/1.73m2    GFR est non-AA 51 (L) >60 ml/min/1.73m2    Calcium 8.7 8.5 - 10.1 MG/DL    Bilirubin, total 0.3 0.2 - 1.0 MG/DL    ALT (SGPT) 38 13 - 56 U/L    AST (SGOT) 68 (H) 10 - 38 U/L    Alk.  phosphatase 136 (H) 45 - 117 U/L    Protein, total 7.0 6.4 - 8.2 g/dL    Albumin 3.7 3.4 - 5.0 g/dL    Globulin 3.3 2.0 - 4.0 g/dL    A-G Ratio 1.1 0.8 - 1.7     CARDIAC PANEL,(CK, CKMB & TROPONIN)    Collection Time: 09/27/19 10:50 PM   Result Value Ref Range    CK 87 26 - 192 U/L    CK - MB 1.9 <3.6 ng/ml    CK-MB Index 2.2 0.0 - 4.0 %    Troponin-I, QT 0.02 0.0 - 0.045 NG/ML   PROTHROMBIN TIME + INR    Collection Time: 09/27/19 10:50 PM   Result Value Ref Range    Prothrombin time 13.3 11.5 - 15.2 sec    INR 1.0 0.8 - 1.2     NT-PRO BNP    Collection Time: 09/27/19 10:50 PM   Result Value Ref Range    NT pro- 0 - 900 PG/ML MAGNESIUM    Collection Time: 09/27/19 10:50 PM   Result Value Ref Range    Magnesium 2.0 1.6 - 2.6 mg/dL   POC G3    Collection Time: 09/27/19 11:50 PM   Result Value Ref Range    Device: BIPAP      FIO2 (POC) 40 %    pH (POC) 7.309 (L) 7.35 - 7.45      pCO2 (POC) 59.6 (H) 35.0 - 45.0 MMHG    pO2 (POC) 93 80 - 100 MMHG    HCO3 (POC) 29.9 (H) 22 - 26 MMOL/L    sO2 (POC) 96 92 - 97 %    Base excess (POC) 2 mmol/L    PEEP/CPAP (POC) 6 cmH2O    PIP (POC) 12      Pressure support 6 cmH2O    Allens test (POC) YES      Total resp. rate 20      Site RIGHT RADIAL      Specimen type (POC) ARTERIAL      Performed by Michelle Reese     Spontaneous timed YES     URINALYSIS W/ RFLX MICROSCOPIC    Collection Time: 09/28/19 12:03 AM   Result Value Ref Range    Color YELLOW      Appearance CLOUDY      Specific gravity 1.015 1.005 - 1.030      pH (UA) 7.5 5.0 - 8.0      Protein 30 (A) NEG mg/dL    Glucose NEGATIVE  NEG mg/dL    Ketone NEGATIVE  NEG mg/dL    Bilirubin NEGATIVE  NEG      Blood NEGATIVE  NEG      Urobilinogen 1.0 0.2 - 1.0 EU/dL    Nitrites NEGATIVE  NEG      Leukocyte Esterase TRACE (A) NEG     URINE MICROSCOPIC ONLY    Collection Time: 09/28/19 12:03 AM   Result Value Ref Range    WBC 5 to 9 0 - 4 /hpf    RBC 5 to 9 0 - 5 /hpf    Epithelial cells 1+ 0 - 5 /lpf    Bacteria 2+ (A) NEG /hpf   POC LACTIC ACID    Collection Time: 09/28/19  1:08 AM   Result Value Ref Range    Lactic Acid (POC) 0.65 0.40 - 2.00 mmol/L         EKG:  NSR. .. Normal Axis. Normal Intervals. No ST elevation or depression. No Hypertrophy. 11:50 PM      X-Ray, CT or other radiology findings or impressions:  XR CHEST PORT    (Results Pending)     Right middle lobe infiltrate mild pulmonary congestion per my read    Progress notes, Consult notes or additional Procedure notes:       Dr Becky Santacruz hospitalist he accepted admission for telemetry. Disposition:    Diagnosis:   1. Community acquired pneumonia of right middle lobe of lung ([address]    2. COPD exacerbation ([facility] [address]        Disposition: Admitted to telemetry         Patient's Medications   Start Taking    No medications on file   Continue Taking    ALBUTEROL SULFATE 90 MCG/ACTUATION AEPB    Take 2 Puffs by inhalation every four (4) hours as needed for Cough or Other (Wheezing). ALPRAZOLAM (XANAX) 1 MG TABLET    Take 1 Tab by mouth two (2) times a day. Max Daily Amount: 2 mg. AMLODIPINE (NORVASC) 10 MG TABLET    Take 1 Tab by mouth daily. ARFORMOTEROL (BROVANA) 15 MCG/2 ML NEBU NEB SOLUTION    2 mL by Nebulization route two (2) times a day. ASPIRIN 81 MG CHEWABLE TABLET    Take 1 Tab by mouth daily. ATORVASTATIN (LIPITOR) 20 MG TABLET    Take 1 Tab by mouth nightly. BUDESONIDE (PULMICORT) 0.5 MG/2 ML NBSP    2 mL by Nebulization route two (2) times a day. BUDESONIDE-FORMOTEROL (SYMBICORT) 160-4.5 MCG/ACTUATION HFAA    Take 2 Puffs by inhalation two (2) times a day. BUTALBITAL-ACETAMINOPHEN-CAFF (FIORICET) -40 MG PER CAPSULE    Take 1 Cap by mouth every four (4) hours as needed for Pain. CLONIDINE HCL (CATAPRES) 0.2 MG TABLET    Take 1 Tab by mouth two (2) times a day. DOCUSATE SODIUM (COLACE) 100 MG CAPSULE    Take 1 Cap by mouth two (2) times a day. ESCITALOPRAM OXALATE (LEXAPRO) 10 MG TABLET    Take 1 Tab by mouth every evening. FAMOTIDINE (PEPCID) 20 MG TABLET    Take 1 Tab by mouth daily. FLUTICASONE FUROATE-VILANTEROL (BREO ELLIPTA) 200-25 MCG/DOSE INHALER    Take 1 Puff by inhalation daily. LISINOPRIL (PRINIVIL, ZESTRIL) 20 MG TABLET    Take 20 mg by mouth daily. METOPROLOL TARTRATE (LOPRESSOR) 25 MG TABLET    Take 0.5 Tabs by mouth every twelve (12) hours. MONTELUKAST (SINGULAIR) 10 MG TABLET    Take 1 Tab by mouth nightly. NALOXONE (NARCAN) 4 MG/ACTUATION NASAL SPRAY    Use 1 spray intranasally, then discard. Repeat with new spray every 2 min as needed for opioid overdose symptoms, alternating nostrils.     OTHER    Check CBC, CMP, Mg in 4 days, results to PCP immediately, Diagnosis- asthma    OTHER    Incentive spirometry- use as directed    ROFLUMILAST (DALIRESP) 500 MCG TAB TABLET    Take 1 Tab by mouth daily. TIOTROPIUM (SPIRIVA) 18 MCG INHALATION CAPSULE    Take 1 Cap by inhalation daily. These Medications have changed    No medications on file   Stop Taking    No medications on file         ADMISSION NOTE:  12:48 AM  Patient is being admitted to the hospital by Dr. Cameron Meier. The results of their tests and reasons for their admission have been discussed with them and/or available family. They convey agreement and understanding for the need to be admitted and for their admission diagnosis. Micaela Quiñones ENP-C,FNP-C      Dragon voice recognition was used to generate this report, which may have resulted in some phonetic based errors in grammar and contents.  Even though attempts were made to correct all the mistakes, some may have been missed, and remained in the body of the document

## 2019-09-28 NOTE — ED NOTES
TRANSFER - OUT REPORT:    Verbal report given to Mrs Elena Reardon on Adyarylisabella Ortez  being transferred to [address] [place] [address](unit) for routine progression of care       Report consisted of patients Situation, Background, Assessment and   Recommendations(SBAR). Information from the following report(s) SBAR, ED Summary and MAR was reviewed with the receiving nurse. Lines:   Peripheral IV 09/27/19 Right Antecubital (Active)   Site Assessment Clean, dry, & intact 9/27/2019 10:50 PM   Phlebitis Assessment 0 9/27/2019 10:50 PM   Infiltration Assessment 0 9/27/2019 10:50 PM   Dressing Status Clean, dry, & intact 9/27/2019 10:50 PM   Dressing Type 4 X 4;Tape;Transparent 9/27/2019 10:50 PM   Hub Color/Line Status Flushed;Patent 9/27/2019 10:50 PM   Action Taken Blood drawn 9/27/2019 10:50 PM   Alcohol Cap Used No 9/27/2019 10:50 PM        Opportunity for questions and clarification was provided.       Patient transported with:   Monitor  O2 @ 4 liters

## 2019-09-28 NOTE — PROGRESS NOTES
Problem: Activity Intolerance  Goal: *Oxygen saturation during activity within specified parameters  Outcome: Progressing Towards Goal     Problem:  Activity Intolerance  Goal: *Able to remain out of bed as prescribed  Outcome: Progressing Towards Goal     Problem: Pneumonia: Day 1  Goal: Activity/Safety  Outcome: Progressing Towards Goal     Problem: Pneumonia: Day 1  Goal: Consults, if ordered  Outcome: Progressing Towards Goal     Problem: Pneumonia: Day 1  Goal: Diagnostic Test/Procedures  Outcome: Progressing Towards Goal     Problem: Pneumonia: Day 1  Goal: Nutrition/Diet  Outcome: Progressing Towards Goal     Problem: Pneumonia: Day 1  Goal: Medications  Outcome: Progressing Towards Goal

## 2019-09-28 NOTE — ROUTINE PROCESS
TRANSFER - IN REPORT: 
 
Verbal report received from [facility] CATIA RN (name) on Alis Tai  being received from ED(unit) for routine progression of care Report consisted of patients Situation, Background, Assessment and  
Recommendations(SBAR). Information from the following report(s) SBAR, Kardex, Intake/Output, Recent Results and Med Rec Status was reviewed with the receiving nurse. Opportunity for questions and clarification will be provided. Assessment will be completed upon patients arrival to unit and care assumed.

## 2019-09-28 NOTE — ROUTINE PROCESS
Bedside shift change report given to Elmira Peralta (oncoming nurse) by Martha Pleitez (offgoing nurse). Report included the following information SBAR, Kardex and MAR. Patient resting in bed.

## 2019-09-28 NOTE — PROGRESS NOTES
[facility]  Progress Note    Patient: Baldev Cornejo Age: 53 y.o. : 1965 MR#: [id number] SSN: [id number]  Date: 2019     Subjective/24-hour events:     Admitted this AM by night staff. Complains of pain and requests psychiatric meds. Respiratory status improving. Assessment:   COPD with acute exacerbation  Acute hypercapnic respiratory failure requiring BiPAP, resolved  Hypertensive urgency  Concern for pneumonia, doubt  Chronic prescription narcotic use   Obesity, BMI 39.4  Active smoker    Plan:  Have initiated previously prescribed anxiolytic and psychiatric meds. Continue IV solumedrol and pulmonary hygiene as ordered. BPs improved - monitor. PRN agent with parameters. Supportive care o/w. Follow. Case discussed with:  [x]Patient  []Family  [x]Nursing  []Case Management  DVT Prophylaxis:  []Lovenox  [x]Hep SQ  []SCDs  []Coumadin   []On Heparin gtt    Objective:   VS:   Visit Vitals  /69 (BP 1 Location: Left arm, BP Patient Position: At rest)   Pulse 77   Temp 98.1 °F (36.7 °C)   Resp 19   Ht 4' 11.02\" (1.499 m)   Wt 88.6 kg (195 lb 6.4 oz)   LMP 2009   SpO2 94%   Breastfeeding? No   BMI 39.44 kg/m²      Tmax/24hrs: Temp (24hrs), Av.3 °F (36.8 °C), Min:98.1 °F (36.7 °C), Max:98.5 °F (36.9 °C)  No intake or output data in the 24 hours ending 19 1556    General:  In NAD. Nontoxic-appearing. Cardiovascular:  RRR. Pulmonary:  Diffuse wheezes bilaterally. Breath sounds fair. GI:  Abdomen soft, NTTP. Extremities:  Warm, no ischemia.     Neuro:  Awake and alert, motor nonfocal.    Labs:    Recent Results (from the past 24 hour(s))   CBC WITH AUTOMATED DIFF    Collection Time: 19 10:50 PM   Result Value Ref Range    WBC 11.3 4.6 - 13.2 K/uL    RBC 4.33 4.20 - 5.30 M/uL    HGB 12.8 12.0 - 16.0 g/dL    HCT 40.3 35.0 - 45.0 %    MCV 93.1 74.0 - 97.0 FL    MCH 29.6 24.0 - 34.0 PG    MCHC 31.8 31.0 - 37.0 g/dL    RDW 14.2 11.6 - 14.5 %    PLATELET 410 398 - 530 K/uL    MPV 11.3 9.2 - 11.8 FL    NEUTROPHILS 71 40 - 73 %    LYMPHOCYTES 22 21 - 52 %    MONOCYTES 4 3 - 10 %    EOSINOPHILS 3 0 - 5 %    BASOPHILS 0 0 - 2 %    ABS. NEUTROPHILS 8.1 (H) 1.8 - 8.0 K/UL    ABS. LYMPHOCYTES 2.4 0.9 - 3.6 K/UL    ABS. MONOCYTES 0.5 0.05 - 1.2 K/UL    ABS. EOSINOPHILS 0.3 0.0 - 0.4 K/UL    ABS. BASOPHILS 0.0 0.0 - 0.1 K/UL    DF AUTOMATED     METABOLIC PANEL, COMPREHENSIVE    Collection Time: 09/27/19 10:50 PM   Result Value Ref Range    Sodium 148 (H) 136 - 145 mmol/L    Potassium 3.7 3.5 - 5.5 mmol/L    Chloride 111 100 - 111 mmol/L    CO2 32 21 - 32 mmol/L    Anion gap 5 3.0 - 18 mmol/L    Glucose 99 74 - 99 mg/dL    BUN 22 (H) 7.0 - 18 MG/DL    Creatinine 1.12 0.6 - 1.3 MG/DL    BUN/Creatinine ratio 20 12 - 20      GFR est AA >60 >60 ml/min/1.73m2    GFR est non-AA 51 (L) >60 ml/min/1.73m2    Calcium 8.7 8.5 - 10.1 MG/DL    Bilirubin, total 0.3 0.2 - 1.0 MG/DL    ALT (SGPT) 38 13 - 56 U/L    AST (SGOT) 68 (H) 10 - 38 U/L    Alk.  phosphatase 136 (H) 45 - 117 U/L    Protein, total 7.0 6.4 - 8.2 g/dL    Albumin 3.7 3.4 - 5.0 g/dL    Globulin 3.3 2.0 - 4.0 g/dL    A-G Ratio 1.1 0.8 - 1.7     CARDIAC PANEL,(CK, CKMB & TROPONIN)    Collection Time: 09/27/19 10:50 PM   Result Value Ref Range    CK 87 26 - 192 U/L    CK - MB 1.9 <3.6 ng/ml    CK-MB Index 2.2 0.0 - 4.0 %    Troponin-I, QT 0.02 0.0 - 0.045 NG/ML   PROTHROMBIN TIME + INR    Collection Time: 09/27/19 10:50 PM   Result Value Ref Range    Prothrombin time 13.3 11.5 - 15.2 sec    INR 1.0 0.8 - 1.2     NT-PRO BNP    Collection Time: 09/27/19 10:50 PM   Result Value Ref Range    NT pro- 0 - 900 PG/ML   MAGNESIUM    Collection Time: 09/27/19 10:50 PM   Result Value Ref Range    Magnesium 2.0 1.6 - 2.6 mg/dL   TSH 3RD GENERATION    Collection Time: 09/27/19 10:50 PM   Result Value Ref Range    TSH 0.18 (L) 0.36 - 3.74 uIU/mL   T4, FREE    Collection Time: 09/27/19 10:50 PM   Result Value Ref Range    T4, Free 1.1 0.7 - 1.5 NG/DL   POC G3    Collection Time: 09/27/19 11:50 PM   Result Value Ref Range    Device: BIPAP      FIO2 (POC) 40 %    pH (POC) 7.309 (L) 7.35 - 7.45      pCO2 (POC) 59.6 (H) 35.0 - 45.0 MMHG    pO2 (POC) 93 80 - 100 MMHG    HCO3 (POC) 29.9 (H) 22 - 26 MMOL/L    sO2 (POC) 96 92 - 97 %    Base excess (POC) 2 mmol/L    PEEP/CPAP (POC) 6 cmH2O    PIP (POC) 12      Pressure support 6 cmH2O    Allens test (POC) YES      Total resp.  rate 20      Site RIGHT RADIAL      Specimen type (POC) ARTERIAL      Performed by Melida Riley     Spontaneous timed YES     URINALYSIS W/ RFLX MICROSCOPIC    Collection Time: 09/28/19 12:03 AM   Result Value Ref Range    Color YELLOW      Appearance CLOUDY      Specific gravity 1.015 1.005 - 1.030      pH (UA) 7.5 5.0 - 8.0      Protein 30 (A) NEG mg/dL    Glucose NEGATIVE  NEG mg/dL    Ketone NEGATIVE  NEG mg/dL    Bilirubin NEGATIVE  NEG      Blood NEGATIVE  NEG      Urobilinogen 1.0 0.2 - 1.0 EU/dL    Nitrites NEGATIVE  NEG      Leukocyte Esterase TRACE (A) NEG     URINE MICROSCOPIC ONLY    Collection Time: 09/28/19 12:03 AM   Result Value Ref Range    WBC 5 to 9 0 - 4 /hpf    RBC 5 to 9 0 - 5 /hpf    Epithelial cells 1+ 0 - 5 /lpf    Bacteria 2+ (A) NEG /hpf   EKG, 12 LEAD, INITIAL    Collection Time: 09/28/19 12:41 AM   Result Value Ref Range    Ventricular Rate 79 BPM    Atrial Rate 79 BPM    P-R Interval 140 ms    QRS Duration 86 ms    Q-T Interval 396 ms    QTC Calculation (Bezet) 454 ms    Calculated P Axis 58 degrees    Calculated R Axis 48 degrees    Calculated T Axis 50 degrees    Diagnosis       Normal sinus rhythm  Minimal voltage criteria for LVH, may be normal variant  Borderline ECG  When compared with ECG of 26-JUL-2019 10:10,  premature ventricular complexes are no longer present  premature atrial complexes are no longer present  Left bundle branch block is no longer present  Confirmed by Erin Daley MD, ----- ([id number]) on 9/28/2019 3:04:04 PM     CULTURE, BLOOD    Collection Time: 09/28/19 12:50 AM   Result Value Ref Range    Special Requests: NO SPECIAL REQUESTS      Culture result: NO GROWTH AFTER 2 HOURS     CULTURE, BLOOD    Collection Time: 09/28/19  1:00 AM   Result Value Ref Range    Special Requests: NO SPECIAL REQUESTS      Culture result: NO GROWTH AFTER 2 HOURS     POC LACTIC ACID    Collection Time: 09/28/19  1:08 AM   Result Value Ref Range    Lactic Acid (POC) 0.65 0.40 - 2.00 mmol/L   HEMOGLOBIN A1C WITH EAG    Collection Time: 09/28/19  3:49 AM   Result Value Ref Range    Hemoglobin A1c 4.5 4.2 - 5.6 %    Est. average glucose Cannot be calculated mg/dL   METABOLIC PANEL, BASIC    Collection Time: 09/28/19  3:49 AM   Result Value Ref Range    Sodium 143 136 - 145 mmol/L    Potassium 3.2 (L) 3.5 - 5.5 mmol/L    Chloride 108 100 - 111 mmol/L    CO2 28 21 - 32 mmol/L    Anion gap 7 3.0 - 18 mmol/L    Glucose 150 (H) 74 - 99 mg/dL    BUN 27 (H) 7.0 - 18 MG/DL    Creatinine 1.03 0.6 - 1.3 MG/DL    BUN/Creatinine ratio 26 (H) 12 - 20      GFR est AA >60 >60 ml/min/1.73m2    GFR est non-AA 56 (L) >60 ml/min/1.73m2    Calcium 8.9 8.5 - 10.1 MG/DL   CBC WITH AUTOMATED DIFF    Collection Time: 09/28/19  3:49 AM   Result Value Ref Range    WBC 11.7 4.6 - 13.2 K/uL    RBC 4.43 4.20 - 5.30 M/uL    HGB 13.0 12.0 - 16.0 g/dL    HCT 40.9 35.0 - 45.0 %    MCV 92.3 74.0 - 97.0 FL    MCH 29.3 24.0 - 34.0 PG    MCHC 31.8 31.0 - 37.0 g/dL    RDW 14.0 11.6 - 14.5 %    PLATELET 642 267 - 753 K/uL    MPV 11.8 9.2 - 11.8 FL    NEUTROPHILS 96 (H) 40 - 73 %    LYMPHOCYTES 3 (L) 21 - 52 %    MONOCYTES 1 (L) 3 - 10 %    EOSINOPHILS 0 0 - 5 %    BASOPHILS 0 0 - 2 %    ABS. NEUTROPHILS 11.3 (H) 1.8 - 8.0 K/UL    ABS. LYMPHOCYTES 0.4 (L) 0.9 - 3.6 K/UL    ABS. MONOCYTES 0.1 0.05 - 1.2 K/UL    ABS. EOSINOPHILS 0.0 0.0 - 0.4 K/UL    ABS.  BASOPHILS 0.0 0.0 - 0.1 K/UL    DF AUTOMATED         Signed By: Addie Valenzuela MD     September 28, 2019

## 2019-09-28 NOTE — ROUTINE PROCESS
Bedside and Verbal shift change report given to [facility] Street (oncoming nurse) by Gabrielle Lowry RN (offgoing nurse). Report included the following information SBAR, Kardex, Intake/Output, MAR, Recent Results and Med Rec Status.

## 2019-09-28 NOTE — PROGRESS NOTES
Patient has taken telemtery box off multiple times today. It has been reapplied with her consent and education has been provided and when nurse enters room, it is at end of bed. Will continue to monitor.

## 2019-09-28 NOTE — PROGRESS NOTES
Dominique left per patient for [address] ([phone]), her therapist, requesting complete list of her psych medications.

## 2019-09-28 NOTE — H&P
History & Physical    Patient: Thalia Gagnon MRN: [id number]  CSN: [id number]    YOB: 1965  Age: 53 y.o. Sex: female      DOA: 9/27/2019    Chief Complaint:   Chief Complaint   Patient presents with    Respiratory Distress          HPI:     Thalia Gagnon is a 53 y.o.  female who has PMH of COPD, tobacco use, HTN nad dHF   Pt presented to ER in severe respiratory distress with heavy wheezing and rhonchi that required placing her on BIPAP. Pt states that she started with flu like sxs 2 weeks ago and has progressively gotten worse in the last 2 days with productive cough and heavy wheezing despite using her inhalers. In ER, ABG showed hypercapnia  With acidotic PH. Her BP highly elevated ut denied having fever at home and had none in Er. CXR showed possible Rt sided PNA but mild edema   Pt was given multiple nebs , steroids and Lasix x 1 but continued to have heavy wheezing and will be admitted for further MGT    Past Medical History:   Diagnosis Date    Asthma     [facility]     Chronic obstructive pulmonary disease ([facility] [address]     Endocrine disease     thyroid issues    Gastrointestinal disorder     \"blockage in my stomach\"    Hypertension        No past surgical history on file. No family history on file. Social History     Socioeconomic History    Marital status: SINGLE     Spouse name: Not on file    Number of children: Not on file    Years of education: Not on file    Highest education level: Not on file   Tobacco Use    Smoking status: Current Every Day Smoker     Packs/day: 0.50    Smokeless tobacco: Former User   Substance and Sexual Activity    Alcohol use: No     Comment: socially    Drug use: Not Currently     Types: Heroin    Sexual activity: Never     Comment: last used 9 years ago       Prior to Admission medications    Medication Sig Start Date End Date Taking?  Authorizing Provider   albuterol sulfate 90 mcg/actuation aepb Take 2 Puffs by inhalation every four (4) hours as needed for Cough or Other (Wheezing). 7/25/19   Aminah Chilel MD   fluticasone furoate-vilanterol (BREO ELLIPTA) 200-25 mcg/dose inhaler Take 1 Puff by inhalation daily. Shalini Knox MD   lisinopril (PRINIVIL, ZESTRIL) 20 mg tablet Take 20 mg by mouth daily. Other, MD Shalini   amLODIPine (NORVASC) 10 mg tablet Take 1 Tab by mouth daily. 7/5/19   Jayson Rocha MD   butalbital-acetaminophen-caff (FIORICET) -40 mg per capsule Take 1 Cap by mouth every four (4) hours as needed for Pain. 6/30/19   Rolly Ruiz PA-C   arformoterol (BROVANA) 15 mcg/2 mL nebu neb solution 2 mL by Nebulization route two (2) times a day. 3/14/19   Nelsy Finch MD   budesonide (PULMICORT) 0.5 mg/2 mL nbsp 2 mL by Nebulization route two (2) times a day. 3/14/19   Nelsy Finch MD   tiotropium (SPIRIVA) 18 mcg inhalation capsule Take 1 Cap by inhalation daily. 3/14/19   Nelsy Finch MD   aspirin 81 mg chewable tablet Take 1 Tab by mouth daily. 3/15/19   Nelsy Finch MD   atorvastatin (LIPITOR) 20 mg tablet Take 1 Tab by mouth nightly. 3/14/19   Nelsy Finch MD   cloNIDine HCl (CATAPRES) 0.2 mg tablet Take 1 Tab by mouth two (2) times a day. 3/14/19   Nelsy Finch MD   docusate sodium (COLACE) 100 mg capsule Take 1 Cap by mouth two (2) times a day. 3/14/19   Nelsy Finch MD   escitalopram oxalate (LEXAPRO) 10 mg tablet Take 1 Tab by mouth every evening. 3/14/19   Nelsy Finch MD   metoprolol tartrate (LOPRESSOR) 25 mg tablet Take 0.5 Tabs by mouth every twelve (12) hours. 3/14/19   Nelsy Finch MD   roflumilast (DALIRESP) 500 mcg tab tablet Take 1 Tab by mouth daily.  3/15/19   Nelsy Finch MD   OTHER Check CBC, CMP, Mg in 4 days, results to PCP immediately, Diagnosis- asthma 3/14/19   Nelsy Finch MD   OTHER Incentive spirometry- use as directed 3/14/19   Nelsy Finch MD   naloxone [facility]) 4 mg/actuation nasal spray Use 1 spray intranasally, then discard. Repeat with new spray every 2 min as needed for opioid overdose symptoms, alternating nostrils. 3/14/19   Jessy Iglesias MD   budesonide-formoterol (SYMBICORT) 160-4.5 mcg/actuation HFAA Take 2 Puffs by inhalation two (2) times a day. 18   Kamini Ashley DO   ALPRAZolam Adina Crooked) 1 mg tablet Take 1 Tab by mouth two (2) times a day. Max Daily Amount: 2 mg. 17   Nadya Villeda MD   famotidine (PEPCID) 20 mg tablet Take 1 Tab by mouth daily. 3/9/17   Maria Elena Hernandez MD   montelukast (SINGULAIR) 10 mg tablet Take 1 Tab by mouth nightly. 3/9/17   Maria Elena Hernandez MD       No Known Allergies      Review of Systems  GENERAL: Patient alert, awake and oriented times 3, unable to communicate full sentences and in distress. HEENT: No change in vision, no earache, tinnitus, sore throat or sinus congestion. NECK: No pain or stiffness. PULMONARY: + shortness of breath, cough/ wheeze. Cardiovascular: no pnd / orthopnea, no CP  GASTROINTESTINAL: No abdominal pain, nausea, vomiting or diarrhea, melena or bright red blood per rectum. GENITOURINARY: No urinary frequency, urgency, hesitancy or dysuria. MUSCULOSKELETAL: No joint or muscle pain, no back pain, no recent trauma. DERMATOLOGIC: No rash, no itching, no lesions. ENDOCRINE: No polyuria, polydipsia, no heat or cold intolerance. No recent change in weight. HEMATOLOGICAL: No anemia or easy bruising or bleeding. NEUROLOGIC: No headache, seizures, numbness, tingling or weakness. Physical Exam:     Physical Exam:  Visit Vitals  /79   Pulse 88   Temp 98.2 °F (36.8 °C)   Resp 21   LMP 2009   SpO2 96%      O2 Device: BIPAP    Temp (24hrs), Av.2 °F (36.8 °C), Min:98.2 °F (36.8 °C), Max:98.2 °F (36.8 °C)    No intake/output data recorded. No intake/output data recorded. General:  Alert, cooperative, in distress, appears stated age.               Head: Normocephalic, without obvious abnormality, atraumatic. Eyes:  Conjunctivae/corneas clear. PERRL, EOMs intact. Nose: Nares normal. No drainage or sinus tenderness. Neck: Supple, symmetrical, trachea midline, no adenopathy, thyroid: no enlargement, no carotid bruit and no JVD. Lungs:   Decrease BS with heavy wheezing and rhonchi   Heart:  Regular rate and rhythm, S1, S2 normal.     Abdomen: Soft, non-tender. Bowel sounds normal.    Extremities: Extremities normal, atraumatic, no cyanosis or edema. Pulses: 2+ and symmetric all extremities. Skin:  No rashes or lesions   Neurologic: AAOx3, No focal motor or sensory deficit. Labs Reviewed:    Lab results reviewed. For significant abnormal values and values requiring intervention, see assessment and plan. CXR and EKG    Procedures/imaging: see electronic medical records for all procedures/Xrays and details which were not copied into this note but were reviewed prior to creation of Plan      Assessment/Plan     Principal Problem:    Acute respiratory failure with hypercapnia ([address] (2/26/2017)    Active Problems:    COPD (chronic obstructive pulmonary disease) ([address] (9/28/2019)      CAP (community acquired pneumonia) (9/28/2019)      Hypertensive urgency (9/28/2019)      Tobacco use (9/28/2019)       Pt will be admitted to [facility] for COPD exacerbation 2 ry to CAP    Will continue IV steroids, Bronchodilators  Empiric antibiotics and Mucolytic's. Blood cultures and sensitivity.   Will continue BIPAP QHS   Continue home meds for HTN and monitor   SSI while on steroids     Home meds are reconciled       DVT/GI Prophylaxis: Hep SQ    Plan of care is discussed in details with Patient/Family at bedside and agreed upon    Scarlett Opitz, MD  9/28/2019 1:14 AM

## 2019-09-29 LAB
ANION GAP SERPL CALC-SCNC: 6 MMOL/L (ref 3–18)
BASOPHILS # BLD: 0 K/UL (ref 0–0.1)
BASOPHILS NFR BLD: 0 % (ref 0–2)
BUN SERPL-MCNC: 21 MG/DL (ref 7–18)
BUN/CREAT SERPL: 21 (ref 12–20)
CALCIUM SERPL-MCNC: 9 MG/DL (ref 8.5–10.1)
CHLORIDE SERPL-SCNC: 105 MMOL/L (ref 100–111)
CO2 SERPL-SCNC: 27 MMOL/L (ref 21–32)
CREAT SERPL-MCNC: 0.98 MG/DL (ref 0.6–1.3)
DIFFERENTIAL METHOD BLD: ABNORMAL
EOSINOPHIL # BLD: 0 K/UL (ref 0–0.4)
EOSINOPHIL NFR BLD: 0 % (ref 0–5)
ERYTHROCYTE [DISTWIDTH] IN BLOOD BY AUTOMATED COUNT: 14.1 % (ref 11.6–14.5)
GLUCOSE SERPL-MCNC: 168 MG/DL (ref 74–99)
HCT VFR BLD AUTO: 41.1 % (ref 35–45)
HGB BLD-MCNC: 13.2 G/DL (ref 12–16)
LYMPHOCYTES # BLD: 1.2 K/UL (ref 0.9–3.6)
LYMPHOCYTES NFR BLD: 8 % (ref 21–52)
MAGNESIUM SERPL-MCNC: 2.2 MG/DL (ref 1.6–2.6)
MCH RBC QN AUTO: 29.4 PG (ref 24–34)
MCHC RBC AUTO-ENTMCNC: 32.1 G/DL (ref 31–37)
MCV RBC AUTO: 91.5 FL (ref 74–97)
MONOCYTES # BLD: 0.3 K/UL (ref 0.05–1.2)
MONOCYTES NFR BLD: 2 % (ref 3–10)
NEUTS SEG # BLD: 12.9 K/UL (ref 1.8–8)
NEUTS SEG NFR BLD: 90 % (ref 40–73)
PHOSPHATE SERPL-MCNC: 2.8 MG/DL (ref 2.5–4.9)
PLATELET # BLD AUTO: 227 K/UL (ref 135–420)
PMV BLD AUTO: 12 FL (ref 9.2–11.8)
POTASSIUM SERPL-SCNC: 3.8 MMOL/L (ref 3.5–5.5)
RBC # BLD AUTO: 4.49 M/UL (ref 4.2–5.3)
SODIUM SERPL-SCNC: 138 MMOL/L (ref 136–145)
WBC # BLD AUTO: 14.3 K/UL (ref 4.6–13.2)

## 2019-09-29 PROCEDURE — 94660 CPAP INITIATION&MGMT: CPT

## 2019-09-29 PROCEDURE — 74011250636 HC RX REV CODE- 250/636: Performed by: INTERNAL MEDICINE

## 2019-09-29 PROCEDURE — 65660000000 HC RM CCU STEPDOWN

## 2019-09-29 PROCEDURE — 77010033678 HC OXYGEN DAILY

## 2019-09-29 PROCEDURE — 74011250637 HC RX REV CODE- 250/637: Performed by: INTERNAL MEDICINE

## 2019-09-29 PROCEDURE — 94640 AIRWAY INHALATION TREATMENT: CPT

## 2019-09-29 PROCEDURE — 80048 BASIC METABOLIC PNL TOTAL CA: CPT

## 2019-09-29 PROCEDURE — 74011000250 HC RX REV CODE- 250: Performed by: FAMILY MEDICINE

## 2019-09-29 PROCEDURE — 94761 N-INVAS EAR/PLS OXIMETRY MLT: CPT

## 2019-09-29 PROCEDURE — 74011250637 HC RX REV CODE- 250/637: Performed by: FAMILY MEDICINE

## 2019-09-29 PROCEDURE — 36415 COLL VENOUS BLD VENIPUNCTURE: CPT

## 2019-09-29 PROCEDURE — 83735 ASSAY OF MAGNESIUM: CPT

## 2019-09-29 PROCEDURE — 84100 ASSAY OF PHOSPHORUS: CPT

## 2019-09-29 PROCEDURE — 85025 COMPLETE CBC W/AUTO DIFF WBC: CPT

## 2019-09-29 PROCEDURE — 74011000250 HC RX REV CODE- 250: Performed by: INTERNAL MEDICINE

## 2019-09-29 RX ORDER — MAG HYDROX/ALUMINUM HYD/SIMETH 200-200-20
30 SUSPENSION, ORAL (FINAL DOSE FORM) ORAL
Status: DISCONTINUED | OUTPATIENT
Start: 2019-09-29 | End: 2019-10-04 | Stop reason: HOSPADM

## 2019-09-29 RX ORDER — IPRATROPIUM BROMIDE AND ALBUTEROL SULFATE 2.5; .5 MG/3ML; MG/3ML
3 SOLUTION RESPIRATORY (INHALATION)
Status: DISCONTINUED | OUTPATIENT
Start: 2019-09-29 | End: 2019-10-02

## 2019-09-29 RX ORDER — IBUPROFEN 400 MG/1
400 TABLET ORAL ONCE
Status: COMPLETED | OUTPATIENT
Start: 2019-09-29 | End: 2019-09-29

## 2019-09-29 RX ORDER — IBUPROFEN 200 MG
1 TABLET ORAL DAILY
Status: DISCONTINUED | OUTPATIENT
Start: 2019-09-29 | End: 2019-10-04 | Stop reason: HOSPADM

## 2019-09-29 RX ADMIN — METHYLPREDNISOLONE SODIUM SUCCINATE 60 MG: 125 INJECTION, POWDER, FOR SOLUTION INTRAMUSCULAR; INTRAVENOUS at 05:16

## 2019-09-29 RX ADMIN — METHYLPREDNISOLONE SODIUM SUCCINATE 60 MG: 125 INJECTION, POWDER, FOR SOLUTION INTRAMUSCULAR; INTRAVENOUS at 11:40

## 2019-09-29 RX ADMIN — OXYCODONE HYDROCHLORIDE AND ACETAMINOPHEN 2 TABLET: 5; 325 TABLET ORAL at 05:01

## 2019-09-29 RX ADMIN — IPRATROPIUM BROMIDE AND ALBUTEROL SULFATE 3 ML: .5; 3 SOLUTION RESPIRATORY (INHALATION) at 07:32

## 2019-09-29 RX ADMIN — IBUPROFEN 400 MG: 400 TABLET, FILM COATED ORAL at 20:32

## 2019-09-29 RX ADMIN — ALPRAZOLAM 1 MG: 0.5 TABLET ORAL at 05:34

## 2019-09-29 RX ADMIN — IPRATROPIUM BROMIDE AND ALBUTEROL SULFATE 3 ML: .5; 3 SOLUTION RESPIRATORY (INHALATION) at 13:15

## 2019-09-29 RX ADMIN — LEVOFLOXACIN 750 MG: 5 INJECTION, SOLUTION INTRAVENOUS at 01:55

## 2019-09-29 RX ADMIN — OXYCODONE HYDROCHLORIDE AND ACETAMINOPHEN 2 TABLET: 5; 325 TABLET ORAL at 09:34

## 2019-09-29 RX ADMIN — HEPARIN SODIUM 5000 UNITS: 5000 INJECTION INTRAVENOUS; SUBCUTANEOUS at 09:19

## 2019-09-29 RX ADMIN — ROFLUMILAST 500 MCG: 500 TABLET ORAL at 09:08

## 2019-09-29 RX ADMIN — ONDANSETRON 4 MG: 2 INJECTION INTRAMUSCULAR; INTRAVENOUS at 07:33

## 2019-09-29 RX ADMIN — ALPRAZOLAM 1 MG: 0.5 TABLET ORAL at 16:20

## 2019-09-29 RX ADMIN — FAMOTIDINE 20 MG: 20 TABLET ORAL at 09:09

## 2019-09-29 RX ADMIN — CEFEPIME 2 G: 2 INJECTION, POWDER, FOR SOLUTION INTRAVENOUS at 16:28

## 2019-09-29 RX ADMIN — HYDRALAZINE HYDROCHLORIDE 10 MG: 20 INJECTION INTRAMUSCULAR; INTRAVENOUS at 07:08

## 2019-09-29 RX ADMIN — ONDANSETRON 4 MG: 2 INJECTION INTRAMUSCULAR; INTRAVENOUS at 20:08

## 2019-09-29 RX ADMIN — ALUMINUM HYDROXIDE, MAGNESIUM HYDROXIDE, AND SIMETHICONE 30 ML: 200; 200; 20 SUSPENSION ORAL at 22:33

## 2019-09-29 RX ADMIN — ASPIRIN 81 MG 81 MG: 81 TABLET ORAL at 09:08

## 2019-09-29 RX ADMIN — METHYLPREDNISOLONE SODIUM SUCCINATE 60 MG: 125 INJECTION, POWDER, FOR SOLUTION INTRAMUSCULAR; INTRAVENOUS at 22:34

## 2019-09-29 RX ADMIN — OXYCODONE HYDROCHLORIDE AND ACETAMINOPHEN 2 TABLET: 5; 325 TABLET ORAL at 17:33

## 2019-09-29 RX ADMIN — HEPARIN SODIUM 5000 UNITS: 5000 INJECTION INTRAVENOUS; SUBCUTANEOUS at 17:28

## 2019-09-29 RX ADMIN — ESCITALOPRAM OXALATE 10 MG: 10 TABLET ORAL at 17:28

## 2019-09-29 RX ADMIN — POTASSIUM CHLORIDE 40 MEQ: 20 TABLET, EXTENDED RELEASE ORAL at 02:07

## 2019-09-29 RX ADMIN — HYDROCHLOROTHIAZIDE 25 MG: 25 TABLET ORAL at 09:08

## 2019-09-29 RX ADMIN — AMLODIPINE BESYLATE 10 MG: 10 TABLET ORAL at 09:08

## 2019-09-29 RX ADMIN — OXYCODONE HYDROCHLORIDE AND ACETAMINOPHEN 2 TABLET: 5; 325 TABLET ORAL at 21:19

## 2019-09-29 RX ADMIN — CEFEPIME 2 G: 2 INJECTION, POWDER, FOR SOLUTION INTRAVENOUS at 07:25

## 2019-09-29 RX ADMIN — CLONIDINE HYDROCHLORIDE 0.2 MG: 0.1 TABLET ORAL at 09:08

## 2019-09-29 RX ADMIN — METHYLPREDNISOLONE SODIUM SUCCINATE 60 MG: 125 INJECTION, POWDER, FOR SOLUTION INTRAMUSCULAR; INTRAVENOUS at 17:28

## 2019-09-29 RX ADMIN — CLONIDINE HYDROCHLORIDE 0.2 MG: 0.1 TABLET ORAL at 17:28

## 2019-09-29 RX ADMIN — ATORVASTATIN CALCIUM 20 MG: 20 TABLET, FILM COATED ORAL at 21:19

## 2019-09-29 RX ADMIN — QUETIAPINE 300 MG: 100 TABLET ORAL at 21:19

## 2019-09-29 RX ADMIN — SODIUM CHLORIDE 75 ML/HR: 900 INJECTION, SOLUTION INTRAVENOUS at 01:55

## 2019-09-29 RX ADMIN — LISINOPRIL 20 MG: 20 TABLET ORAL at 09:09

## 2019-09-29 RX ADMIN — AZITHROMYCIN MONOHYDRATE 500 MG: 500 INJECTION, POWDER, LYOPHILIZED, FOR SOLUTION INTRAVENOUS at 09:19

## 2019-09-29 RX ADMIN — IPRATROPIUM BROMIDE AND ALBUTEROL SULFATE 3 ML: .5; 3 SOLUTION RESPIRATORY (INHALATION) at 09:33

## 2019-09-29 RX ADMIN — IBUPROFEN 400 MG: 400 TABLET, FILM COATED ORAL at 07:24

## 2019-09-29 NOTE — PROGRESS NOTES
[facility]  Progress Note    Patient: Doug Eaton Age: 53 y.o. : 1965 MR#: [id number] SSN: [id number]  Date: 2019     Subjective/24-hour events:     Continues have a significant wheezing this AM but reports feeling a little better overall. Remains afebrile. Assessment:   COPD with acute exacerbation  Acute hypercapnic respiratory failure requiring BiPAP, resolved  Hypertensive urgency  Concern for pneumonia, doubt  Chronic prescription narcotic use   Obesity, BMI 39.4  Active smoker    Plan:  IV steroid and bronchodilators as ordered. Usual pulmonary hygiene - bronchial hygiene protocol ordered. Will consider pulmonology evaluation tomorrow if remains slow to improve. Antihypertensive therapy as ordered. Pressures improved over admission - monitor and adjust meds as necessary. Mobilize as able. Follow. Case discussed with:  [x]Patient  []Family  [x]Nursing  []Case Management  DVT Prophylaxis:  []Lovenox  [x]Hep SQ  []SCDs  []Coumadin   []On Heparin gtt    Objective:   VS:   Visit Vitals  /80 (BP 1 Location: Right arm, BP Patient Position: Head of bed elevated (Comment degrees))   Pulse 97   Temp 98.3 °F (36.8 °C)   Resp 20   Ht 4' 11.02\" (1.499 m)   Wt 88.6 kg (195 lb 6.4 oz)   LMP 2009   SpO2 98%   Breastfeeding? No   BMI 39.44 kg/m²      Tmax/24hrs: Temp (24hrs), Av °F (36.7 °C), Min:97.7 °F (36.5 °C), Max:98.4 °F (36.9 °C)      Intake/Output Summary (Last 24 hours) at 2019 1000  Last data filed at 2019 0659  Gross per 24 hour   Intake 420 ml   Output    Net 420 ml       General:  In NAD. Nontoxic-appearing. Cardiovascular:  RRR. Pulmonary:  Diffuse wheezes bilaterally. Breath sounds fair. GI:  Abdomen soft, NTTP. Extremities:  Warm, no ischemia.     Neuro:  Awake and alert, motor nonfocal.    Labs:    Recent Results (from the past 24 hour(s))   METABOLIC PANEL, BASIC    Collection Time: 19  4:03 AM Result Value Ref Range    Sodium 138 136 - 145 mmol/L    Potassium 3.8 3.5 - 5.5 mmol/L    Chloride 105 100 - 111 mmol/L    CO2 27 21 - 32 mmol/L    Anion gap 6 3.0 - 18 mmol/L    Glucose 168 (H) 74 - 99 mg/dL    BUN 21 (H) 7.0 - 18 MG/DL    Creatinine 0.98 0.6 - 1.3 MG/DL    BUN/Creatinine ratio 21 (H) 12 - 20      GFR est AA >60 >60 ml/min/1.73m2    GFR est non-AA 59 (L) >60 ml/min/1.73m2    Calcium 9.0 8.5 - 10.1 MG/DL   MAGNESIUM    Collection Time: 09/29/19  4:03 AM   Result Value Ref Range    Magnesium 2.2 1.6 - 2.6 mg/dL   CBC WITH AUTOMATED DIFF    Collection Time: 09/29/19  4:03 AM   Result Value Ref Range    WBC 14.3 (H) 4.6 - 13.2 K/uL    RBC 4.49 4.20 - 5.30 M/uL    HGB 13.2 12.0 - 16.0 g/dL    HCT 41.1 35.0 - 45.0 %    MCV 91.5 74.0 - 97.0 FL    MCH 29.4 24.0 - 34.0 PG    MCHC 32.1 31.0 - 37.0 g/dL    RDW 14.1 11.6 - 14.5 %    PLATELET 179 001 - 869 K/uL    MPV 12.0 (H) 9.2 - 11.8 FL    NEUTROPHILS 90 (H) 40 - 73 %    LYMPHOCYTES 8 (L) 21 - 52 %    MONOCYTES 2 (L) 3 - 10 %    EOSINOPHILS 0 0 - 5 %    BASOPHILS 0 0 - 2 %    ABS. NEUTROPHILS 12.9 (H) 1.8 - 8.0 K/UL    ABS. LYMPHOCYTES 1.2 0.9 - 3.6 K/UL    ABS. MONOCYTES 0.3 0.05 - 1.2 K/UL    ABS. EOSINOPHILS 0.0 0.0 - 0.4 K/UL    ABS.  BASOPHILS 0.0 0.0 - 0.1 K/UL    DF AUTOMATED     PHOSPHORUS    Collection Time: 09/29/19  4:03 AM   Result Value Ref Range    Phosphorus 2.8 2.5 - 4.9 MG/DL       Signed By: Octavio Mendieta MD     September 29, 2019

## 2019-09-29 NOTE — ROUTINE PROCESS
Calm for the most part today. Bedside and Verbal shift change report given to Ildefonso Wood (oncoming nurse) by Fuad Fox (offgoing nurse). Report included the following information Kardex, Intake/Output and MAR.

## 2019-09-29 NOTE — ROUTINE PROCESS
Upset and very anxious vomited once small amount yellowish., c/o sob wheezing , breathing treatment given.

## 2019-09-29 NOTE — ROUTINE PROCESS
1910 Pt received after shift change report from Veronica Jacobs RN. Recently medicated for pain. See MAR. Bedside commode within reach. Bed locked and in low position. Call bell in reach. 2251 Pt refused to have BIPAP machine on per respiratory. 2300 BIPAP on. Tolerating well 
 
0205 Pt takes BIPAP mask off . Refused to wear oxygen via NC. Requesting cereal, milk and ginger ale. 0530 Pt states, she did not receive Xanax during day shift. Informed pt that medication was given by day nurse base on report. Pt cursing at day nurse and very adamant that she did not get xanax. Added, she would have known because she know the exact color of xanax. 1845 Pt refused morning dose of subq heparin. Bedside shift change report given to [address] [place] [address] Street, RN (oncoming nurse) by Teresa Mcguire RN (offgoing nurse). Report included the following information SBAR, MAR, KARDEX AND RECENT RESULTS

## 2019-09-29 NOTE — PROGRESS NOTES
RESPIRATORY MEDICATION PROTOCOL ASSESSMENT      Patient  Jonel Peres     53 y.o.   female     9/29/2019  9:37 AM    Breath Sounds Pre Procedure:  Breath Sounds Bilateral: Expiratory wheezing                                            Breath Sounds Post Procedure: Breath Sounds Bilateral: Expiratory wheezing                                               Breathing pattern: Pre procedure  Breathing Pattern: Dyspnea at rest          Post procedure  Breathing Pattern: Regular    Cough: Pre procedure  Cough: Non-productive               Post procedure Cough: Non-productive    Heart Rate: Pre procedure Pulse: 68           Post procedure Pulse: 70    Resp Rate: Pre procedure  Respirations: 18           Post procedure          Nebulizer Therapy: Current medications Aerosolized Medications: DuoNeb       Problem List:   Patient Active Problem List   Diagnosis Code    Asthma exacerbation attacks J45. 0    Status asthmaticus with COPD (chronic obstructive pulmonary disease) ([facility]) J44.9, J45.902    Abnormal CT of the chest R93.89    Asthma J45.909    Acute on chronic respiratory failure with hypoxemia ([facility]) J96.21    Acute exacerbation of chronic obstructive pulmonary disease (COPD) ([facility]) J44.1    Respiratory failure ([facility]) J96.90    Acute encephalopathy G93.40    Asthma exacerbation J45. 901    Acute respiratory failure with hypercapnia ([facility]) J96.02    COPD with acute exacerbation ([facility]) J44.1    Essential hypertension I10    Morbid obesity due to excess calories ([facility]) E66.01    Chest pain on breathing R07.1    Acute asthma exacerbation J45. 0    Hypertensive heart failure ([facility]) I11.0    Sleep apnea G47.30    COPD (chronic obstructive pulmonary disease) with chronic bronchitis ([facility]) J44.9    Acute bronchitis with chronic obstructive pulmonary disease (COPD) ([facility]) J44.0, J20.9    T wave inversion in EKG R94.31    COPD (chronic obstructive pulmonary disease) ([facility]) J44.9    CAP (community acquired pneumonia) J18.9    Hypertensive urgency I16.0    Tobacco use Z72.0       Patient alert and cooperative to use MDI: Yes    Home Respiratory Therapy Regimen/Frequency:  YES  Medication duobeb    Device neb  Frequency qid2    SEVERITY INDEX:    ITEM 0 1 2 3 4 Score   Respiratory Pattern and or Rate Regular  10-19 Regular  20-24   24-30    30-34 Severe SOB or   Greater than 35 0   Breath Sounds Clear Occasional Wheeze Mild Wheezing Moderate Wheezing  wheezing/Absent breath sounds 3   Shortness of Breath None Dyspnea on Exertion Dyspnea at Rest Moderate Shortness of Breath at Rest Severe Shortness of Breath - Limited Speech 2       Total Score:  5    * Scoring Guidelines  0-4 pts:  PRN-BID   5-7 pts:  BID, TID, QID  8-9 pts:  TID, QID, Q6  10-12 pts:  Q4-Q6  * - Guidelines used with clinical judgement. PRN Treatments can be ordered to supplement scheduled treatments. Regardless of score, frequency should not be less than normal home regimen. Recommended Order/Frequency:  Make patient q6 duobebs as she is wheezing and takes at home.       Comments:  DC'd Breo as patient is wheezing and adamently refused Breo and said so in not so nice words        Respiratory Therapist: Nigel Gibson, RT

## 2019-09-30 LAB
ANION GAP SERPL CALC-SCNC: 6 MMOL/L (ref 3–18)
BACTERIA SPEC CULT: ABNORMAL
BASOPHILS # BLD: 0 K/UL (ref 0–0.1)
BASOPHILS NFR BLD: 0 % (ref 0–2)
BUN SERPL-MCNC: 22 MG/DL (ref 7–18)
BUN/CREAT SERPL: 21 (ref 12–20)
CALCIUM SERPL-MCNC: 8.5 MG/DL (ref 8.5–10.1)
CHLORIDE SERPL-SCNC: 106 MMOL/L (ref 100–111)
CO2 SERPL-SCNC: 27 MMOL/L (ref 21–32)
CREAT SERPL-MCNC: 1.07 MG/DL (ref 0.6–1.3)
DIFFERENTIAL METHOD BLD: ABNORMAL
EOSINOPHIL # BLD: 0 K/UL (ref 0–0.4)
EOSINOPHIL NFR BLD: 0 % (ref 0–5)
ERYTHROCYTE [DISTWIDTH] IN BLOOD BY AUTOMATED COUNT: 14.1 % (ref 11.6–14.5)
GLUCOSE SERPL-MCNC: 132 MG/DL (ref 74–99)
GRAM STN SPEC: ABNORMAL
GRAM STN SPEC: ABNORMAL
HCT VFR BLD AUTO: 39.5 % (ref 35–45)
HGB BLD-MCNC: 12.8 G/DL (ref 12–16)
LYMPHOCYTES # BLD: 0.9 K/UL (ref 0.9–3.6)
LYMPHOCYTES NFR BLD: 8 % (ref 21–52)
MCH RBC QN AUTO: 29.3 PG (ref 24–34)
MCHC RBC AUTO-ENTMCNC: 32.4 G/DL (ref 31–37)
MCV RBC AUTO: 90.4 FL (ref 74–97)
MONOCYTES # BLD: 0.2 K/UL (ref 0.05–1.2)
MONOCYTES NFR BLD: 2 % (ref 3–10)
NEUTS SEG # BLD: 10.7 K/UL (ref 1.8–8)
NEUTS SEG NFR BLD: 90 % (ref 40–73)
PLATELET # BLD AUTO: 215 K/UL (ref 135–420)
PMV BLD AUTO: 12 FL (ref 9.2–11.8)
POTASSIUM SERPL-SCNC: 3.9 MMOL/L (ref 3.5–5.5)
RBC # BLD AUTO: 4.37 M/UL (ref 4.2–5.3)
SERVICE CMNT-IMP: ABNORMAL
SODIUM SERPL-SCNC: 139 MMOL/L (ref 136–145)
WBC # BLD AUTO: 11.8 K/UL (ref 4.6–13.2)

## 2019-09-30 PROCEDURE — 94640 AIRWAY INHALATION TREATMENT: CPT

## 2019-09-30 PROCEDURE — 36415 COLL VENOUS BLD VENIPUNCTURE: CPT

## 2019-09-30 PROCEDURE — 94761 N-INVAS EAR/PLS OXIMETRY MLT: CPT

## 2019-09-30 PROCEDURE — 74011250636 HC RX REV CODE- 250/636: Performed by: INTERNAL MEDICINE

## 2019-09-30 PROCEDURE — 80048 BASIC METABOLIC PNL TOTAL CA: CPT

## 2019-09-30 PROCEDURE — 74011250637 HC RX REV CODE- 250/637: Performed by: FAMILY MEDICINE

## 2019-09-30 PROCEDURE — 74011000250 HC RX REV CODE- 250: Performed by: FAMILY MEDICINE

## 2019-09-30 PROCEDURE — 65660000000 HC RM CCU STEPDOWN

## 2019-09-30 PROCEDURE — 74011000250 HC RX REV CODE- 250: Performed by: INTERNAL MEDICINE

## 2019-09-30 PROCEDURE — 85025 COMPLETE CBC W/AUTO DIFF WBC: CPT

## 2019-09-30 PROCEDURE — 74011250637 HC RX REV CODE- 250/637: Performed by: INTERNAL MEDICINE

## 2019-09-30 RX ORDER — BUDESONIDE 0.5 MG/2ML
500 INHALANT ORAL
Status: DISCONTINUED | OUTPATIENT
Start: 2019-09-30 | End: 2019-09-30

## 2019-09-30 RX ORDER — POLYETHYLENE GLYCOL 3350 17 G/17G
17 POWDER, FOR SOLUTION ORAL DAILY
Status: DISCONTINUED | OUTPATIENT
Start: 2019-10-01 | End: 2019-10-04 | Stop reason: HOSPADM

## 2019-09-30 RX ORDER — BUDESONIDE 0.5 MG/2ML
500 INHALANT ORAL
Status: DISCONTINUED | OUTPATIENT
Start: 2019-09-30 | End: 2019-10-04 | Stop reason: HOSPADM

## 2019-09-30 RX ORDER — MAGNESIUM CITRATE
296 SOLUTION, ORAL ORAL
Status: COMPLETED | OUTPATIENT
Start: 2019-09-30 | End: 2019-09-30

## 2019-09-30 RX ADMIN — METHYLPREDNISOLONE SODIUM SUCCINATE 60 MG: 125 INJECTION, POWDER, FOR SOLUTION INTRAMUSCULAR; INTRAVENOUS at 04:26

## 2019-09-30 RX ADMIN — AMLODIPINE BESYLATE 10 MG: 10 TABLET ORAL at 08:16

## 2019-09-30 RX ADMIN — IPRATROPIUM BROMIDE AND ALBUTEROL SULFATE 3 ML: .5; 3 SOLUTION RESPIRATORY (INHALATION) at 14:45

## 2019-09-30 RX ADMIN — AZITHROMYCIN MONOHYDRATE 500 MG: 500 INJECTION, POWDER, LYOPHILIZED, FOR SOLUTION INTRAVENOUS at 08:30

## 2019-09-30 RX ADMIN — BUDESONIDE 500 MCG: 0.5 INHALANT RESPIRATORY (INHALATION) at 20:00

## 2019-09-30 RX ADMIN — FAMOTIDINE 20 MG: 20 TABLET ORAL at 08:16

## 2019-09-30 RX ADMIN — CEFEPIME 2 G: 2 INJECTION, POWDER, FOR SOLUTION INTRAVENOUS at 00:23

## 2019-09-30 RX ADMIN — OXYCODONE HYDROCHLORIDE AND ACETAMINOPHEN 2 TABLET: 5; 325 TABLET ORAL at 17:11

## 2019-09-30 RX ADMIN — QUETIAPINE 300 MG: 100 TABLET ORAL at 21:29

## 2019-09-30 RX ADMIN — CEFEPIME 2 G: 2 INJECTION, POWDER, FOR SOLUTION INTRAVENOUS at 17:11

## 2019-09-30 RX ADMIN — ATORVASTATIN CALCIUM 20 MG: 20 TABLET, FILM COATED ORAL at 21:29

## 2019-09-30 RX ADMIN — ALPRAZOLAM 1 MG: 0.5 TABLET ORAL at 17:49

## 2019-09-30 RX ADMIN — OXYCODONE HYDROCHLORIDE AND ACETAMINOPHEN 2 TABLET: 5; 325 TABLET ORAL at 12:04

## 2019-09-30 RX ADMIN — OXYCODONE HYDROCHLORIDE AND ACETAMINOPHEN 2 TABLET: 5; 325 TABLET ORAL at 01:26

## 2019-09-30 RX ADMIN — IPRATROPIUM BROMIDE AND ALBUTEROL SULFATE 3 ML: .5; 3 SOLUTION RESPIRATORY (INHALATION) at 19:18

## 2019-09-30 RX ADMIN — HEPARIN SODIUM 5000 UNITS: 5000 INJECTION INTRAVENOUS; SUBCUTANEOUS at 12:04

## 2019-09-30 RX ADMIN — IPRATROPIUM BROMIDE AND ALBUTEROL SULFATE 3 ML: .5; 3 SOLUTION RESPIRATORY (INHALATION) at 03:06

## 2019-09-30 RX ADMIN — ASPIRIN 81 MG 81 MG: 81 TABLET ORAL at 08:16

## 2019-09-30 RX ADMIN — LISINOPRIL 20 MG: 20 TABLET ORAL at 08:16

## 2019-09-30 RX ADMIN — OXYCODONE HYDROCHLORIDE AND ACETAMINOPHEN 2 TABLET: 5; 325 TABLET ORAL at 05:40

## 2019-09-30 RX ADMIN — MAGNESIUM CITRATE 296 ML: 1.75 LIQUID ORAL at 17:49

## 2019-09-30 RX ADMIN — LEVOFLOXACIN 750 MG: 5 INJECTION, SOLUTION INTRAVENOUS at 01:26

## 2019-09-30 RX ADMIN — ROFLUMILAST 500 MCG: 500 TABLET ORAL at 08:16

## 2019-09-30 RX ADMIN — METHYLPREDNISOLONE SODIUM SUCCINATE 60 MG: 125 INJECTION, POWDER, FOR SOLUTION INTRAMUSCULAR; INTRAVENOUS at 17:11

## 2019-09-30 RX ADMIN — CLONIDINE HYDROCHLORIDE 0.2 MG: 0.1 TABLET ORAL at 08:16

## 2019-09-30 RX ADMIN — IPRATROPIUM BROMIDE AND ALBUTEROL SULFATE 3 ML: .5; 3 SOLUTION RESPIRATORY (INHALATION) at 07:32

## 2019-09-30 RX ADMIN — ALPRAZOLAM 1 MG: 0.5 TABLET ORAL at 04:26

## 2019-09-30 RX ADMIN — ESCITALOPRAM OXALATE 10 MG: 10 TABLET ORAL at 17:11

## 2019-09-30 RX ADMIN — CEFEPIME 2 G: 2 INJECTION, POWDER, FOR SOLUTION INTRAVENOUS at 08:15

## 2019-09-30 RX ADMIN — HEPARIN SODIUM 5000 UNITS: 5000 INJECTION INTRAVENOUS; SUBCUTANEOUS at 17:11

## 2019-09-30 RX ADMIN — HEPARIN SODIUM 5000 UNITS: 5000 INJECTION INTRAVENOUS; SUBCUTANEOUS at 01:33

## 2019-09-30 RX ADMIN — HYDROCHLOROTHIAZIDE 25 MG: 25 TABLET ORAL at 08:16

## 2019-09-30 RX ADMIN — METHYLPREDNISOLONE SODIUM SUCCINATE 60 MG: 125 INJECTION, POWDER, FOR SOLUTION INTRAMUSCULAR; INTRAVENOUS at 12:05

## 2019-09-30 RX ADMIN — CLONIDINE HYDROCHLORIDE 0.2 MG: 0.1 TABLET ORAL at 17:11

## 2019-09-30 RX ADMIN — OXYCODONE HYDROCHLORIDE AND ACETAMINOPHEN 2 TABLET: 5; 325 TABLET ORAL at 21:29

## 2019-09-30 NOTE — PROGRESS NOTES
conducted an initial consultation and Spiritual Assessment for Mitch Wolff, who is a 53 y.o.,female. Patients Primary Language is: Joceline Crisostomo. According to the patients EMR Yazidi Affiliation is: [place]. The reason the Patient came to the hospital is:   Patient Active Problem List    Diagnosis Date Noted    COPD (chronic obstructive pulmonary disease) ([address] 09/28/2019    CAP (community acquired pneumonia) 09/28/2019    Hypertensive urgency 09/28/2019    Tobacco use 09/28/2019    Acute bronchitis with chronic obstructive pulmonary disease (COPD) ([address] 03/09/2019    T wave inversion in EKG 03/09/2019    Hypertensive heart failure ([address] 12/19/2017    Sleep apnea 12/19/2017    COPD (chronic obstructive pulmonary disease) with chronic bronchitis ([address] 12/19/2017    Acute asthma exacerbation 04/21/2017    Essential hypertension 03/10/2017    Morbid obesity due to excess calories ([address] 03/10/2017    Chest pain on breathing 03/10/2017    COPD with acute exacerbation ([address] 03/02/2017    Acute respiratory failure with hypercapnia (HCC) 02/26/2017    Asthma exacerbation 01/26/2017    Respiratory failure ([address] 01/11/2017    Acute encephalopathy 01/11/2017    Acute exacerbation of chronic obstructive pulmonary disease (COPD) ([address] 08/29/2016    Acute on chronic respiratory failure with hypoxemia (HCC) 03/12/2016    Asthma 03/05/2016    Abnormal CT of the chest 11/20/2015    Asthma exacerbation attacks 10/19/2015    Status asthmaticus with COPD (chronic obstructive pulmonary disease) ([address] 10/19/2015        The  provided the following Interventions:  Initiated a relationship of care and support. Provided information about Spiritual Care Services. Chart reviewed. Assessment:  Not up for a long visit as she is very tired. Patient does not have any Zoroastrian/cultural needs that will affect patients preferences in health care.        Plan:  Chaplains will continue to follow and will provide pastoral care on an as needed/requested basis.  recommends bedside caregivers page  on duty if patient shows signs of acute spiritual or emotional distress.     [address]  [phone])

## 2019-09-30 NOTE — PROGRESS NOTES
Patient is disrespectful to staff, complains dispite all efforts to provide her wants. Currently complaining that the seroquel makes her stomach feels weird. She is throwing tantrums and using expletives. Dr Morgan Killer contacted and Manny was prescribed and given.

## 2019-09-30 NOTE — PROGRESS NOTES
Reason for Admission:  CAP (community acquired pneumonia) [J18.9]  COPD (chronic obstructive pulmonary disease) ([facility] [J44.9]                 RRAT Score:    10            Plan for utilizing home health:    yes                      Likelihood of Readmission:   LOW                         Transition of Care Plan:              Initial assessment completed with patient. Cognitive status of patient: oriented to time, place, person and situation. Face sheet information confirmed:  yes. The patient designates Daughter Elizabeth Harding [phone] to participate in her discharge plan and to receive any needed information. This patient lives in a single family home with patient and mother. Patient is able to navigate steps as needed. Prior to hospitalization, patient was considered to be independent with ADLs/IADLS : yes . Patient has a current ACP document on file: yes  The patient and daughter will be available to transport patient home upon discharge. The patient already has Vilma Rhonda, W/C,  medical equipment available in the home. Patient is not currently active with home health. Patient has not stayed in a skilled nursing facility or rehab. This patient is on dialysis :no    List of available Home Health agencies were provided and reviewed with the patient prior to discharge. Freedom of choice signed: yes, for [facility]. Currently, the discharge plan is Home with [address] [place] [address] Mukesh Arce. The patient states that she can obtain her medications from the pharmacy, and take her medications as directed.     Patient's current insurance is Medicaid       Care Management Interventions  PCP Verified by CM: Yes(needs pcp)  Mode of Transport at Discharge: Self  Current Support Network: Relative's Home(mother)  Confirm Follow Up Transport: Family  Plan discussed with Pt/Family/Caregiver: Yes  Freedom of Choice Offered: Yes  Discharge Location  Discharge Placement: Home with home health        Virginia Hernandez RN BSN  Care Manager  [phone]

## 2019-09-30 NOTE — ROUTINE PROCESS
Bedside shift change report given to Jose Stokes (oncoming nurse) by Brenda Zarate (offgoing nurse). Report included the following information SBAR, Kardex and MAR. Patient resting in bed.

## 2019-09-30 NOTE — PROGRESS NOTES
NUTRITION    Nursing Referral: [facility]     RECOMMENDATIONS / PLAN:     - Continue current diet order, monitor intake. - Continue RD inpatient monitoring and evaluation. NUTRITION INTERVENTIONS & DIAGNOSIS:     - Meals/snacks: modify composition    Nutrition Diagnosis: Unintended weight loss related to predicted inadequate energy intake as evidenced by pt with a 15 lb, 7.1% weight loss x 6 months PTA. ASSESSMENT:     Pt drowsy during visit, limited responses to questions. Reports decreased appetite x 3 weeks PTA with weight loss, unable to provide further details. Good intake today per chart, tolerating diet. COPD, receiving steroid therapy.     Nutritional intake adequate to meet patients estimated nutritional needs:  Unable to determine at this time    Diet: DIET REGULAR      Food Allergies: NKFA  Current Appetite: Unknown  Appetite/meal intake prior to admission: Fair x 3 weeks  Feeding Limitations:  [] Swallowing difficulty    [] Chewing difficulty    [] Other:  Current Meal Intake:   Patient Vitals for the past 100 hrs:   % Diet Eaten   09/30/19 1032 75 %       BM: 9/27  Skin Integrity: WDL   Edema:   [x] No     [] Yes   Pertinent Medications: Reviewed: mylanta, pepcid, steroid, zofran    Recent Labs     09/30/19  0359 09/29/19  0403 09/28/19  0349 09/27/19  2250    138 143 148*   K 3.9 3.8 3.2* 3.7    105 108 111   CO2 27 27 28 32   * 168* 150* 99   BUN 22* 21* 27* 22*   CREA 1.07 0.98 1.03 1.12   CA 8.5 9.0 8.9 8.7   MG  --  2.2  --  2.0   PHOS  --  2.8  --   --    ALB  --   --   --  3.7   SGOT  --   --   --  68*   ALT  --   --   --  38       Intake/Output Summary (Last 24 hours) at 9/30/2019 1325  Last data filed at 9/30/2019 1032  Gross per 24 hour   Intake 380 ml   Output 350 ml   Net 30 ml       Anthropometrics:  Ht Readings from Last 1 Encounters:   09/28/19 4' 11.02\" (1.499 m)     Last 3 Recorded Weights in this Encounter    09/28/19 0633 09/29/19 0427 09/30/19 0419   Weight: 88.6 kg (195 lb 6.4 oz) 88.6 kg (195 lb 6.4 oz) 89.4 kg (197 lb)     Body mass index is 39.77 kg/m². Obese Class II    Weight History: Reports a 10 lb weight loss x 3 weeks PTA. Per chart -15 lbs, 7.1 % x 6 months. Weight Metrics 9/30/2019 7/25/2019 7/5/2019 6/30/2019 4/14/2019 4/14/2019 3/14/2019   Weight 197 lb 202 lb 204 lb 184 lb 203 lb 203 lb 212 lb 12.8 oz   BMI 39.77 kg/m2 40.8 kg/m2 41.2 kg/m2 37.16 kg/m2 41 kg/m2 41 kg/m2 42.98 kg/m2        Admitting Diagnosis: CAP (community acquired pneumonia) [J18.9]  COPD (chronic obstructive pulmonary disease) ([facility] [J44.9]  Pertinent PMHx: COPD, HTN, CHF    Education Needs:        [x] None identified  [] Identified - Not appropriate at this time  []  Identified and addressed - refer to education log  Learning Limitations:   [x] None identified  [] Identified    Cultural, Rastafari & ethnic food preferences:  [x] None identified    [] Identified and addressed     ESTIMATED NUTRITION NEEDS:     Calories: 8086-9046 kcal (MSJx1.2-1.3) based on  [x] Actual BW: 89 kg      [] IBW   Protein: 71-89 gm (0.8-1 gm/kg) based on  [x] Actual BW      [] IBW   Fluid: 1 mL/kcal     MONITORING & EVALUATION:     Nutrition Goal(s):   - PO nutrition intake will meet >75% of patient estimated nutritional needs within the next 7 days. Outcome: New/Initial goal     Monitoring:   [x] Food and beverage intake   [x] Diet order   [x] Nutrition-focused physical findings   [x] Treatment/therapy   [] Weight   [] Enteral nutrition intake        Previous Recommendations (for follow-up assessments only):  Not Applicable     Discharge Planning: No nutritional discharge needs at this time.    [x] Participated in care planning, discharge planning, & interdisciplinary rounds as appropriate      Dejah Bolivar RD   Pager: [phone]

## 2019-09-30 NOTE — PROGRESS NOTES
[facility]  Progress Note    Patient: William Gentile Age: 53 y.o. : 1965 MR#: [id number] SSN: [id number]  Date: 2019     Subjective/24-hour events:     Still has fair amount of wheezing and increased dyspnea with activity. Patient asking for increase in dose of anxiolytic and narcotic. Assessment:   COPD with acute exacerbation  Acute hypercapnic respiratory failure requiring BiPAP, resolved  Hypertensive urgency  Concern for pneumonia, doubt  Chronic prescription narcotic use   Obesity, BMI 39.4  Active smoker    Plan:  Add pulmicort nebs. Continue IV steroid and bronchodilators as ordered. Some improvement in wheezing noted on exam today. Based on previous experience, patient is notoriously slow to improve. Consider pulmonology evaluation if improvement remains slow. Bronchodilator therapy and usual pulmonary hygiene as ordered. BPs stable - continue current regimen. Needs to stop smoking - previously discussed. No plan to increase BZD or narcotic. Mobilize as able. Follow. Case discussed with:  [x]Patient  []Family  [x]Nursing  [x]Case Management  DVT Prophylaxis:  []Lovenox  [x]Hep SQ  []SCDs  []Coumadin   []On Heparin gtt    Objective:   VS:   Visit Vitals  /77 (BP 1 Location: Left arm, BP Patient Position: At rest) Comment: Simultaneous filing. User may not have seen previous data. Pulse 89 Comment: Simultaneous filing. User may not have seen previous data. Temp 97.8 °F (36.6 °C)   Resp 20   Ht 4' 11.02\" (1.499 m)   Wt 89.4 kg (197 lb)   LMP 2009   SpO2 97%   Breastfeeding? No   BMI 39.77 kg/m²      Tmax/24hrs: Temp (24hrs), Av.3 °F (36.8 °C), Min:97.8 °F (36.6 °C), Max:98.7 °F (37.1 °C)      Intake/Output Summary (Last 24 hours) at 2019 1111  Last data filed at 2019 1032  Gross per 24 hour   Intake 380 ml   Output 350 ml   Net 30 ml       General:  In NAD. Nontoxic-appearing.   Cardiovascular: RRR.  Pulmonary:  Still with diffuse wheezing bilaterally but improved relative to yesterday. GI:  Abdomen soft, NTTP. Extremities:  Warm, no ischemia. Neuro:  Awake and alert, motor nonfocal.    Labs:    Recent Results (from the past 24 hour(s))   CBC WITH AUTOMATED DIFF    Collection Time: 09/30/19  3:59 AM   Result Value Ref Range    WBC 11.8 4.6 - 13.2 K/uL    RBC 4.37 4.20 - 5.30 M/uL    HGB 12.8 12.0 - 16.0 g/dL    HCT 39.5 35.0 - 45.0 %    MCV 90.4 74.0 - 97.0 FL    MCH 29.3 24.0 - 34.0 PG    MCHC 32.4 31.0 - 37.0 g/dL    RDW 14.1 11.6 - 14.5 %    PLATELET 919 804 - 026 K/uL    MPV 12.0 (H) 9.2 - 11.8 FL    NEUTROPHILS 90 (H) 40 - 73 %    LYMPHOCYTES 8 (L) 21 - 52 %    MONOCYTES 2 (L) 3 - 10 %    EOSINOPHILS 0 0 - 5 %    BASOPHILS 0 0 - 2 %    ABS. NEUTROPHILS 10.7 (H) 1.8 - 8.0 K/UL    ABS. LYMPHOCYTES 0.9 0.9 - 3.6 K/UL    ABS. MONOCYTES 0.2 0.05 - 1.2 K/UL    ABS. EOSINOPHILS 0.0 0.0 - 0.4 K/UL    ABS.  BASOPHILS 0.0 0.0 - 0.1 K/UL    DF AUTOMATED     METABOLIC PANEL, BASIC    Collection Time: 09/30/19  3:59 AM   Result Value Ref Range    Sodium 139 136 - 145 mmol/L    Potassium 3.9 3.5 - 5.5 mmol/L    Chloride 106 100 - 111 mmol/L    CO2 27 21 - 32 mmol/L    Anion gap 6 3.0 - 18 mmol/L    Glucose 132 (H) 74 - 99 mg/dL    BUN 22 (H) 7.0 - 18 MG/DL    Creatinine 1.07 0.6 - 1.3 MG/DL    BUN/Creatinine ratio 21 (H) 12 - 20      GFR est AA >60 >60 ml/min/1.73m2    GFR est non-AA 53 (L) >60 ml/min/1.73m2    Calcium 8.5 8.5 - 10.1 MG/DL       Signed By: Shan Ledezma MD     September 30, 2019

## 2019-09-30 NOTE — ROUTINE PROCESS
Bedside shift change report given to Renuka White (oncoming nurse) by Severa Hugger, RN (offgoing nurse). Report included the following information SBAR, Kardex, Intake/Output, Recent Results and Cardiac Rhythm NSR.

## 2019-10-01 LAB
ANION GAP SERPL CALC-SCNC: 6 MMOL/L (ref 3–18)
BASOPHILS # BLD: 0 K/UL (ref 0–0.1)
BASOPHILS NFR BLD: 0 % (ref 0–2)
BUN SERPL-MCNC: 26 MG/DL (ref 7–18)
BUN/CREAT SERPL: 27 (ref 12–20)
CALCIUM SERPL-MCNC: 8.2 MG/DL (ref 8.5–10.1)
CHLORIDE SERPL-SCNC: 104 MMOL/L (ref 100–111)
CO2 SERPL-SCNC: 28 MMOL/L (ref 21–32)
CREAT SERPL-MCNC: 0.98 MG/DL (ref 0.6–1.3)
DIFFERENTIAL METHOD BLD: ABNORMAL
EOSINOPHIL # BLD: 0 K/UL (ref 0–0.4)
EOSINOPHIL NFR BLD: 0 % (ref 0–5)
ERYTHROCYTE [DISTWIDTH] IN BLOOD BY AUTOMATED COUNT: 13.9 % (ref 11.6–14.5)
GLUCOSE SERPL-MCNC: 116 MG/DL (ref 74–99)
HCT VFR BLD AUTO: 39.7 % (ref 35–45)
HGB BLD-MCNC: 13.1 G/DL (ref 12–16)
LYMPHOCYTES # BLD: 0.5 K/UL (ref 0.9–3.6)
LYMPHOCYTES NFR BLD: 6 % (ref 21–52)
MCH RBC QN AUTO: 29.6 PG (ref 24–34)
MCHC RBC AUTO-ENTMCNC: 33 G/DL (ref 31–37)
MCV RBC AUTO: 89.8 FL (ref 74–97)
MONOCYTES # BLD: 0.3 K/UL (ref 0.05–1.2)
MONOCYTES NFR BLD: 3 % (ref 3–10)
NEUTS SEG # BLD: 8.4 K/UL (ref 1.8–8)
NEUTS SEG NFR BLD: 91 % (ref 40–73)
PLATELET # BLD AUTO: 216 K/UL (ref 135–420)
PMV BLD AUTO: 11.5 FL (ref 9.2–11.8)
POTASSIUM SERPL-SCNC: 3.6 MMOL/L (ref 3.5–5.5)
RBC # BLD AUTO: 4.42 M/UL (ref 4.2–5.3)
SODIUM SERPL-SCNC: 138 MMOL/L (ref 136–145)
WBC # BLD AUTO: 9.2 K/UL (ref 4.6–13.2)

## 2019-10-01 PROCEDURE — 74011250637 HC RX REV CODE- 250/637: Performed by: INTERNAL MEDICINE

## 2019-10-01 PROCEDURE — 74011000258 HC RX REV CODE- 258: Performed by: HOSPITALIST

## 2019-10-01 PROCEDURE — 80048 BASIC METABOLIC PNL TOTAL CA: CPT

## 2019-10-01 PROCEDURE — 74011250636 HC RX REV CODE- 250/636: Performed by: HOSPITALIST

## 2019-10-01 PROCEDURE — 74011000250 HC RX REV CODE- 250: Performed by: INTERNAL MEDICINE

## 2019-10-01 PROCEDURE — 74011000250 HC RX REV CODE- 250: Performed by: FAMILY MEDICINE

## 2019-10-01 PROCEDURE — 74011250637 HC RX REV CODE- 250/637: Performed by: FAMILY MEDICINE

## 2019-10-01 PROCEDURE — 77030011943

## 2019-10-01 PROCEDURE — 94761 N-INVAS EAR/PLS OXIMETRY MLT: CPT

## 2019-10-01 PROCEDURE — 85025 COMPLETE CBC W/AUTO DIFF WBC: CPT

## 2019-10-01 PROCEDURE — 94640 AIRWAY INHALATION TREATMENT: CPT

## 2019-10-01 PROCEDURE — 36415 COLL VENOUS BLD VENIPUNCTURE: CPT

## 2019-10-01 PROCEDURE — 74011250636 HC RX REV CODE- 250/636: Performed by: INTERNAL MEDICINE

## 2019-10-01 PROCEDURE — 77010033678 HC OXYGEN DAILY

## 2019-10-01 PROCEDURE — 65660000000 HC RM CCU STEPDOWN

## 2019-10-01 RX ADMIN — HYDROCHLOROTHIAZIDE 25 MG: 25 TABLET ORAL at 08:37

## 2019-10-01 RX ADMIN — IPRATROPIUM BROMIDE AND ALBUTEROL SULFATE 3 ML: .5; 3 SOLUTION RESPIRATORY (INHALATION) at 15:01

## 2019-10-01 RX ADMIN — METHYLPREDNISOLONE SODIUM SUCCINATE 60 MG: 125 INJECTION, POWDER, FOR SOLUTION INTRAMUSCULAR; INTRAVENOUS at 00:02

## 2019-10-01 RX ADMIN — BUDESONIDE 500 MCG: 0.5 INHALANT RESPIRATORY (INHALATION) at 21:04

## 2019-10-01 RX ADMIN — LEVOFLOXACIN 750 MG: 5 INJECTION, SOLUTION INTRAVENOUS at 02:15

## 2019-10-01 RX ADMIN — BUDESONIDE 500 MCG: 0.5 INHALANT RESPIRATORY (INHALATION) at 07:25

## 2019-10-01 RX ADMIN — POLYETHYLENE GLYCOL 3350 17 G: 17 POWDER, FOR SOLUTION ORAL at 08:37

## 2019-10-01 RX ADMIN — AZITHROMYCIN MONOHYDRATE 500 MG: 500 INJECTION, POWDER, LYOPHILIZED, FOR SOLUTION INTRAVENOUS at 08:37

## 2019-10-01 RX ADMIN — METHYLPREDNISOLONE SODIUM SUCCINATE 60 MG: 125 INJECTION, POWDER, FOR SOLUTION INTRAMUSCULAR; INTRAVENOUS at 06:05

## 2019-10-01 RX ADMIN — IPRATROPIUM BROMIDE AND ALBUTEROL SULFATE 3 ML: .5; 3 SOLUTION RESPIRATORY (INHALATION) at 02:00

## 2019-10-01 RX ADMIN — CEFEPIME 2 G: 2 INJECTION, POWDER, FOR SOLUTION INTRAVENOUS at 00:03

## 2019-10-01 RX ADMIN — IPRATROPIUM BROMIDE AND ALBUTEROL SULFATE 3 ML: .5; 3 SOLUTION RESPIRATORY (INHALATION) at 07:25

## 2019-10-01 RX ADMIN — METHYLPREDNISOLONE SODIUM SUCCINATE 60 MG: 125 INJECTION, POWDER, FOR SOLUTION INTRAMUSCULAR; INTRAVENOUS at 09:53

## 2019-10-01 RX ADMIN — ROFLUMILAST 500 MCG: 500 TABLET ORAL at 08:37

## 2019-10-01 RX ADMIN — OXYCODONE HYDROCHLORIDE AND ACETAMINOPHEN 2 TABLET: 5; 325 TABLET ORAL at 02:12

## 2019-10-01 RX ADMIN — AMPICILLIN SODIUM AND SULBACTAM SODIUM 3 G: 2; 1 INJECTION, POWDER, FOR SOLUTION INTRAMUSCULAR; INTRAVENOUS at 20:11

## 2019-10-01 RX ADMIN — ALPRAZOLAM 1 MG: 0.5 TABLET ORAL at 22:34

## 2019-10-01 RX ADMIN — CLONIDINE HYDROCHLORIDE 0.2 MG: 0.1 TABLET ORAL at 08:37

## 2019-10-01 RX ADMIN — CEFEPIME 2 G: 2 INJECTION, POWDER, FOR SOLUTION INTRAVENOUS at 08:37

## 2019-10-01 RX ADMIN — OXYCODONE HYDROCHLORIDE AND ACETAMINOPHEN 2 TABLET: 5; 325 TABLET ORAL at 20:11

## 2019-10-01 RX ADMIN — LISINOPRIL 20 MG: 20 TABLET ORAL at 08:37

## 2019-10-01 RX ADMIN — AMLODIPINE BESYLATE 10 MG: 10 TABLET ORAL at 08:37

## 2019-10-01 RX ADMIN — ALPRAZOLAM 1 MG: 0.5 TABLET ORAL at 06:05

## 2019-10-01 RX ADMIN — OXYCODONE HYDROCHLORIDE AND ACETAMINOPHEN 2 TABLET: 5; 325 TABLET ORAL at 14:17

## 2019-10-01 RX ADMIN — OXYCODONE HYDROCHLORIDE AND ACETAMINOPHEN 2 TABLET: 5; 325 TABLET ORAL at 06:05

## 2019-10-01 RX ADMIN — FAMOTIDINE 20 MG: 20 TABLET ORAL at 08:37

## 2019-10-01 RX ADMIN — IPRATROPIUM BROMIDE AND ALBUTEROL SULFATE 3 ML: .5; 3 SOLUTION RESPIRATORY (INHALATION) at 21:04

## 2019-10-01 RX ADMIN — ASPIRIN 81 MG 81 MG: 81 TABLET ORAL at 08:37

## 2019-10-01 RX ADMIN — HEPARIN SODIUM 5000 UNITS: 5000 INJECTION INTRAVENOUS; SUBCUTANEOUS at 10:00

## 2019-10-01 RX ADMIN — QUETIAPINE 300 MG: 100 TABLET ORAL at 22:34

## 2019-10-01 RX ADMIN — CLONIDINE HYDROCHLORIDE 0.2 MG: 0.1 TABLET ORAL at 17:43

## 2019-10-01 RX ADMIN — ATORVASTATIN CALCIUM 20 MG: 20 TABLET, FILM COATED ORAL at 22:34

## 2019-10-01 RX ADMIN — ESCITALOPRAM OXALATE 10 MG: 10 TABLET ORAL at 17:43

## 2019-10-01 RX ADMIN — METHYLPREDNISOLONE SODIUM SUCCINATE 60 MG: 125 INJECTION, POWDER, FOR SOLUTION INTRAMUSCULAR; INTRAVENOUS at 22:34

## 2019-10-01 RX ADMIN — OXYCODONE HYDROCHLORIDE AND ACETAMINOPHEN 2 TABLET: 5; 325 TABLET ORAL at 09:52

## 2019-10-01 RX ADMIN — AMPICILLIN SODIUM AND SULBACTAM SODIUM 3 G: 2; 1 INJECTION, POWDER, FOR SOLUTION INTRAMUSCULAR; INTRAVENOUS at 14:30

## 2019-10-01 NOTE — PROGRESS NOTES
[facility]  Progress Note    Patient: Anila Lehman Age: 53 y.o. : 1965 MR#: [id number] SSN: [id number]  Date: 10/1/2019     Subjective/24-hour events:     Still has fair amount of wheezing and increased dyspnea with activity. Patient asking for increase in dose of anxiolytic and narcotic - have advised pt that we will not increase her dose of pain meds since she is on a high dose already     Assessment:   COPD with acute exacerbation  Acute hypercapnic respiratory failure requiring BiPAP, resolved  Hypertensive urgency  Concern for pneumonia, doubt  Chronic prescription narcotic use   Obesity, BMI 39.4  Active smoker  Bacteremia - E fecalis     Plan:  Add pulmicort nebs. Continue IV steroid and bronchodilators as ordered. Some improvement in wheezing noted on exam today. Based on previous experience, patient is notoriously slow to improve. Will monitor another day today & pulm consult in AM if still continues to wheeze   Bronchodilator therapy and usual pulmonary hygiene as ordered. Bacteremia - ID consulted - continue IV Unasyn   BPs stable - continue current regimen. Needs to stop smoking - previously discussed. No plan to increase BZD or narcotic. Mobilize as able. Follow. Case discussed with:  [x]Patient  []Family  [x]Nursing  [x]Case Management  DVT Prophylaxis:  []Lovenox  [x]Hep SQ  []SCDs  []Coumadin   []On Heparin gtt    Objective:   VS:   Visit Vitals  /77 (BP 1 Location: Left arm, BP Patient Position: At rest)   Pulse 85   Temp 98.3 °F (36.8 °C)   Resp 18   Ht 4' 11.02\" (1.499 m)   Wt 89.4 kg (197 lb)   LMP 2009   SpO2 96%   Breastfeeding?  No   BMI 39.77 kg/m²      Tmax/24hrs: Temp (24hrs), Av.9 °F (36.6 °C), Min:97.2 °F (36.2 °C), Max:98.3 °F (36.8 °C)      Intake/Output Summary (Last 24 hours) at 10/1/2019 1087  Last data filed at 10/1/2019 0630  Gross per 24 hour   Intake 1200 ml   Output    Net 1200 ml       General: In NAD. Nontoxic-appearing. Cardiovascular:  RRR. Pulmonary:  Still with diffuse wheezing bilaterally but improved relative to yesterday. GI:  Abdomen soft, NTTP. Extremities:  Warm, no ischemia. Neuro:  Awake and alert, motor nonfocal.    Labs:    Recent Results (from the past 24 hour(s))   CBC WITH AUTOMATED DIFF    Collection Time: 10/01/19  4:25 AM   Result Value Ref Range    WBC 9.2 4.6 - 13.2 K/uL    RBC 4.42 4.20 - 5.30 M/uL    HGB 13.1 12.0 - 16.0 g/dL    HCT 39.7 35.0 - 45.0 %    MCV 89.8 74.0 - 97.0 FL    MCH 29.6 24.0 - 34.0 PG    MCHC 33.0 31.0 - 37.0 g/dL    RDW 13.9 11.6 - 14.5 %    PLATELET 027 965 - 084 K/uL    MPV 11.5 9.2 - 11.8 FL    NEUTROPHILS 91 (H) 40 - 73 %    LYMPHOCYTES 6 (L) 21 - 52 %    MONOCYTES 3 3 - 10 %    EOSINOPHILS 0 0 - 5 %    BASOPHILS 0 0 - 2 %    ABS. NEUTROPHILS 8.4 (H) 1.8 - 8.0 K/UL    ABS. LYMPHOCYTES 0.5 (L) 0.9 - 3.6 K/UL    ABS. MONOCYTES 0.3 0.05 - 1.2 K/UL    ABS. EOSINOPHILS 0.0 0.0 - 0.4 K/UL    ABS.  BASOPHILS 0.0 0.0 - 0.1 K/UL    DF AUTOMATED     METABOLIC PANEL, BASIC    Collection Time: 10/01/19  4:25 AM   Result Value Ref Range    Sodium 138 136 - 145 mmol/L    Potassium 3.6 3.5 - 5.5 mmol/L    Chloride 104 100 - 111 mmol/L    CO2 28 21 - 32 mmol/L    Anion gap 6 3.0 - 18 mmol/L    Glucose 116 (H) 74 - 99 mg/dL    BUN 26 (H) 7.0 - 18 MG/DL    Creatinine 0.98 0.6 - 1.3 MG/DL    BUN/Creatinine ratio 27 (H) 12 - 20      GFR est AA >60 >60 ml/min/1.73m2    GFR est non-AA 59 (L) >60 ml/min/1.73m2    Calcium 8.2 (L) 8.5 - 10.1 MG/DL       Signed By: Rachel Nicoel MD     October 1, 2019

## 2019-10-01 NOTE — ROUTINE PROCESS
Bedside and Verbal shift change report given to Holly Lehman RN (oncoming nurse) by Renetta Fu RN 
 (offgoing nurse). Report included the following information SBAR, Kardex, Intake/Output, MAR and Recent Results.

## 2019-10-01 NOTE — ROUTINE PROCESS
Bedside shift change report given to Jina Pickens RN (oncoming nurse) by Monica Medrano RN (offgoing nurse). Report included the following information SBAR, Kardex, Intake/Output, Recent Results and Cardiac Rhythm NSR.

## 2019-10-01 NOTE — PROGRESS NOTES
Problem: Activity Intolerance  Goal: *Oxygen saturation during activity within specified parameters  Outcome: Progressing Towards Goal  Goal: *Able to remain out of bed as prescribed  Outcome: Progressing Towards Goal     Problem: Patient Education: Go to Patient Education Activity  Goal: Patient/Family Education  Outcome: Progressing Towards Goal     Problem: Chronic Obstructive Pulmonary Disease (COPD)  Goal: *Oxygen saturation during activity within specified parameters  Outcome: Progressing Towards Goal  Goal: *Able to remain out of bed as prescribed  Outcome: Progressing Towards Goal  Goal: *Absence of hypoxia  Outcome: Progressing Towards Goal  Goal: *Optimize nutritional status  Outcome: Progressing Towards Goal     Problem: Patient Education: Go to Patient Education Activity  Goal: Patient/Family Education  Outcome: Progressing Towards Goal     Problem: Patient Education: Go to Patient Education Activity  Goal: Patient/Family Education  Outcome: Progressing Towards Goal     Problem: Falls - Risk of  Goal: *Absence of Falls  Description  Document Donny Bansal Fall Risk and appropriate interventions in the flowsheet.   Outcome: Progressing Towards Goal  Note:   Fall Risk Interventions:  Mobility Interventions: Patient to call before getting OOB         Medication Interventions: Patient to call before getting OOB, Teach patient to arise slowly, Evaluate medications/consider consulting pharmacy    Elimination Interventions: Call light in reach, Patient to call for help with toileting needs              Problem: Patient Education: Go to Patient Education Activity  Goal: Patient/Family Education  Outcome: Progressing Towards Goal

## 2019-10-01 NOTE — PROGRESS NOTES
Pt walked out front of hospital with family/friend. This nurse retrieved pt and escorted back to room. Pt noted to be nodding off and appears to be under the influence. Dr. Jacob Houston notified. Order for urine drug screen and d/c anti-anxiety meds. Pt refusing to urinate, pulled out IV and refused reinsertion.

## 2019-10-02 LAB
AMPHET UR QL SCN: NEGATIVE
ANION GAP SERPL CALC-SCNC: 8 MMOL/L (ref 3–18)
BARBITURATES UR QL SCN: NEGATIVE
BASOPHILS # BLD: 0 K/UL (ref 0–0.1)
BASOPHILS NFR BLD: 0 % (ref 0–2)
BENZODIAZ UR QL: POSITIVE
BUN SERPL-MCNC: 41 MG/DL (ref 7–18)
BUN/CREAT SERPL: 20 (ref 12–20)
CALCIUM SERPL-MCNC: 8.3 MG/DL (ref 8.5–10.1)
CANNABINOIDS UR QL SCN: NEGATIVE
CHLORIDE SERPL-SCNC: 101 MMOL/L (ref 100–111)
CO2 SERPL-SCNC: 30 MMOL/L (ref 21–32)
COCAINE UR QL SCN: NEGATIVE
CREAT SERPL-MCNC: 2.09 MG/DL (ref 0.6–1.3)
DIFFERENTIAL METHOD BLD: ABNORMAL
EOSINOPHIL # BLD: 0 K/UL (ref 0–0.4)
EOSINOPHIL NFR BLD: 0 % (ref 0–5)
ERYTHROCYTE [DISTWIDTH] IN BLOOD BY AUTOMATED COUNT: 14 % (ref 11.6–14.5)
GLUCOSE SERPL-MCNC: 131 MG/DL (ref 74–99)
HCT VFR BLD AUTO: 39.7 % (ref 35–45)
HDSCOM,HDSCOM: ABNORMAL
HGB BLD-MCNC: 12.7 G/DL (ref 12–16)
LYMPHOCYTES # BLD: 0.6 K/UL (ref 0.9–3.6)
LYMPHOCYTES NFR BLD: 6 % (ref 21–52)
MCH RBC QN AUTO: 29.2 PG (ref 24–34)
MCHC RBC AUTO-ENTMCNC: 32 G/DL (ref 31–37)
MCV RBC AUTO: 91.3 FL (ref 74–97)
METHADONE UR QL: NEGATIVE
MONOCYTES # BLD: 0.4 K/UL (ref 0.05–1.2)
MONOCYTES NFR BLD: 4 % (ref 3–10)
NEUTS SEG # BLD: 9.8 K/UL (ref 1.8–8)
NEUTS SEG NFR BLD: 90 % (ref 40–73)
OPIATES UR QL: POSITIVE
PCP UR QL: NEGATIVE
PLATELET # BLD AUTO: 235 K/UL (ref 135–420)
PMV BLD AUTO: 11.4 FL (ref 9.2–11.8)
POTASSIUM SERPL-SCNC: 3.9 MMOL/L (ref 3.5–5.5)
RBC # BLD AUTO: 4.35 M/UL (ref 4.2–5.3)
SODIUM SERPL-SCNC: 139 MMOL/L (ref 136–145)
WBC # BLD AUTO: 10.8 K/UL (ref 4.6–13.2)

## 2019-10-02 PROCEDURE — 74011250636 HC RX REV CODE- 250/636: Performed by: INTERNAL MEDICINE

## 2019-10-02 PROCEDURE — 74011250636 HC RX REV CODE- 250/636: Performed by: HOSPITALIST

## 2019-10-02 PROCEDURE — 74011000258 HC RX REV CODE- 258: Performed by: HOSPITALIST

## 2019-10-02 PROCEDURE — 65660000000 HC RM CCU STEPDOWN

## 2019-10-02 PROCEDURE — 36415 COLL VENOUS BLD VENIPUNCTURE: CPT

## 2019-10-02 PROCEDURE — 82785 ASSAY OF IGE: CPT

## 2019-10-02 PROCEDURE — 87040 BLOOD CULTURE FOR BACTERIA: CPT

## 2019-10-02 PROCEDURE — 94761 N-INVAS EAR/PLS OXIMETRY MLT: CPT

## 2019-10-02 PROCEDURE — 80048 BASIC METABOLIC PNL TOTAL CA: CPT

## 2019-10-02 PROCEDURE — 86003 ALLG SPEC IGE CRUDE XTRC EA: CPT

## 2019-10-02 PROCEDURE — 74011250637 HC RX REV CODE- 250/637: Performed by: INTERNAL MEDICINE

## 2019-10-02 PROCEDURE — 74011000250 HC RX REV CODE- 250: Performed by: FAMILY MEDICINE

## 2019-10-02 PROCEDURE — 85025 COMPLETE CBC W/AUTO DIFF WBC: CPT

## 2019-10-02 PROCEDURE — 74011000250 HC RX REV CODE- 250: Performed by: INTERNAL MEDICINE

## 2019-10-02 PROCEDURE — 94640 AIRWAY INHALATION TREATMENT: CPT

## 2019-10-02 PROCEDURE — 74011250637 HC RX REV CODE- 250/637: Performed by: FAMILY MEDICINE

## 2019-10-02 PROCEDURE — 80307 DRUG TEST PRSMV CHEM ANLYZR: CPT

## 2019-10-02 PROCEDURE — 74011000250 HC RX REV CODE- 250: Performed by: HOSPITALIST

## 2019-10-02 RX ORDER — ARFORMOTEROL TARTRATE 15 UG/2ML
15 SOLUTION RESPIRATORY (INHALATION)
Status: DISCONTINUED | OUTPATIENT
Start: 2019-10-02 | End: 2019-10-04 | Stop reason: HOSPADM

## 2019-10-02 RX ORDER — ALBUTEROL SULFATE 0.83 MG/ML
2.5 SOLUTION RESPIRATORY (INHALATION)
Status: DISCONTINUED | OUTPATIENT
Start: 2019-10-02 | End: 2019-10-04 | Stop reason: HOSPADM

## 2019-10-02 RX ORDER — IPRATROPIUM BROMIDE AND ALBUTEROL SULFATE 2.5; .5 MG/3ML; MG/3ML
3 SOLUTION RESPIRATORY (INHALATION)
Status: DISCONTINUED | OUTPATIENT
Start: 2019-10-02 | End: 2019-10-02

## 2019-10-02 RX ORDER — PREDNISONE 20 MG/1
60 TABLET ORAL
Status: DISCONTINUED | OUTPATIENT
Start: 2019-10-03 | End: 2019-10-04 | Stop reason: HOSPADM

## 2019-10-02 RX ADMIN — HEPARIN SODIUM 5000 UNITS: 5000 INJECTION INTRAVENOUS; SUBCUTANEOUS at 02:38

## 2019-10-02 RX ADMIN — AMPICILLIN SODIUM AND SULBACTAM SODIUM 3 G: 2; 1 INJECTION, POWDER, FOR SOLUTION INTRAMUSCULAR; INTRAVENOUS at 22:30

## 2019-10-02 RX ADMIN — CLONIDINE HYDROCHLORIDE 0.2 MG: 0.1 TABLET ORAL at 17:15

## 2019-10-02 RX ADMIN — METHYLPREDNISOLONE SODIUM SUCCINATE 60 MG: 125 INJECTION, POWDER, FOR SOLUTION INTRAMUSCULAR; INTRAVENOUS at 10:28

## 2019-10-02 RX ADMIN — OXYCODONE HYDROCHLORIDE AND ACETAMINOPHEN 2 TABLET: 5; 325 TABLET ORAL at 00:02

## 2019-10-02 RX ADMIN — ACETAMINOPHEN 650 MG: 325 TABLET ORAL at 22:37

## 2019-10-02 RX ADMIN — AMPICILLIN SODIUM AND SULBACTAM SODIUM 3 G: 2; 1 INJECTION, POWDER, FOR SOLUTION INTRAMUSCULAR; INTRAVENOUS at 17:12

## 2019-10-02 RX ADMIN — METHYLPREDNISOLONE SODIUM SUCCINATE 60 MG: 125 INJECTION, POWDER, FOR SOLUTION INTRAMUSCULAR; INTRAVENOUS at 22:29

## 2019-10-02 RX ADMIN — HEPARIN SODIUM 5000 UNITS: 5000 INJECTION INTRAVENOUS; SUBCUTANEOUS at 17:15

## 2019-10-02 RX ADMIN — OXYCODONE HYDROCHLORIDE AND ACETAMINOPHEN 2 TABLET: 5; 325 TABLET ORAL at 08:52

## 2019-10-02 RX ADMIN — LISINOPRIL 20 MG: 20 TABLET ORAL at 08:52

## 2019-10-02 RX ADMIN — HYDROCHLOROTHIAZIDE 25 MG: 25 TABLET ORAL at 08:53

## 2019-10-02 RX ADMIN — ASPIRIN 81 MG 81 MG: 81 TABLET ORAL at 08:52

## 2019-10-02 RX ADMIN — FAMOTIDINE 20 MG: 20 TABLET ORAL at 08:52

## 2019-10-02 RX ADMIN — BUDESONIDE 500 MCG: 0.5 INHALANT RESPIRATORY (INHALATION) at 07:28

## 2019-10-02 RX ADMIN — ATORVASTATIN CALCIUM 20 MG: 20 TABLET, FILM COATED ORAL at 22:30

## 2019-10-02 RX ADMIN — ROFLUMILAST 500 MCG: 500 TABLET ORAL at 08:53

## 2019-10-02 RX ADMIN — CLONIDINE HYDROCHLORIDE 0.2 MG: 0.1 TABLET ORAL at 08:52

## 2019-10-02 RX ADMIN — AMPICILLIN SODIUM AND SULBACTAM SODIUM 3 G: 2; 1 INJECTION, POWDER, FOR SOLUTION INTRAMUSCULAR; INTRAVENOUS at 02:39

## 2019-10-02 RX ADMIN — BUDESONIDE 500 MCG: 0.5 INHALANT RESPIRATORY (INHALATION) at 19:09

## 2019-10-02 RX ADMIN — ARFORMOTEROL TARTRATE 15 MCG: 15 SOLUTION RESPIRATORY (INHALATION) at 19:09

## 2019-10-02 RX ADMIN — ESCITALOPRAM OXALATE 10 MG: 10 TABLET ORAL at 17:15

## 2019-10-02 RX ADMIN — ALPRAZOLAM 1 MG: 0.5 TABLET ORAL at 08:52

## 2019-10-02 RX ADMIN — IPRATROPIUM BROMIDE AND ALBUTEROL SULFATE 3 ML: .5; 3 SOLUTION RESPIRATORY (INHALATION) at 13:33

## 2019-10-02 RX ADMIN — HEPARIN SODIUM 5000 UNITS: 5000 INJECTION INTRAVENOUS; SUBCUTANEOUS at 10:29

## 2019-10-02 RX ADMIN — OXYCODONE HYDROCHLORIDE AND ACETAMINOPHEN 2 TABLET: 5; 325 TABLET ORAL at 04:51

## 2019-10-02 RX ADMIN — METHYLPREDNISOLONE SODIUM SUCCINATE 60 MG: 125 INJECTION, POWDER, FOR SOLUTION INTRAMUSCULAR; INTRAVENOUS at 04:51

## 2019-10-02 RX ADMIN — AMPICILLIN SODIUM AND SULBACTAM SODIUM 3 G: 2; 1 INJECTION, POWDER, FOR SOLUTION INTRAMUSCULAR; INTRAVENOUS at 08:54

## 2019-10-02 RX ADMIN — AMLODIPINE BESYLATE 10 MG: 10 TABLET ORAL at 08:53

## 2019-10-02 RX ADMIN — POLYETHYLENE GLYCOL 3350 17 G: 17 POWDER, FOR SOLUTION ORAL at 08:54

## 2019-10-02 NOTE — FACE TO FACE
Did not want her suger taken reported to ROSALINE Jefferson on night shift francisco kapoor [facility] day [facility]

## 2019-10-02 NOTE — PROGRESS NOTES
RESPIRATORY MEDICATION PROTOCOL ASSESSMENT      Patient  Josi Rinaldi     53 y.o.   female     10/2/2019  1:59 AM    Breath Sounds Pre Procedure:  Breath Sounds Bilateral: Rhonchi                                            Breath Sounds Post Procedure: Breath Sounds Bilateral: Rhonchi                                               Breathing pattern: Pre procedure  Breathing Pattern: Regular          Post procedure  Breathing Pattern: Regular    Cough: Pre procedure  Cough: Non-productive               Post procedure Cough: Non-productive    Heart Rate: Pre procedure Pulse: 90           Post procedure Pulse: 88    Resp Rate: Pre procedure  Respirations: 16           Post procedure          Nebulizer Therapy: Current medications Aerosolized Medications: DuoNeb, Pulmicort       Problem List:   Patient Active Problem List   Diagnosis Code    Asthma exacerbation attacks J45. 0    Status asthmaticus with COPD (chronic obstructive pulmonary disease) ([facility]) J44.9, J45.902    Abnormal CT of the chest R93.89    Asthma J45.909    Acute on chronic respiratory failure with hypoxemia ([facility]) J96.21    Acute exacerbation of chronic obstructive pulmonary disease (COPD) ([facility]) J44.1    Respiratory failure ([facility]) J96.90    Acute encephalopathy G93.40    Asthma exacerbation J45. 901    Acute respiratory failure with hypercapnia ([facility]) J96.02    COPD with acute exacerbation ([facility]) J44.1    Essential hypertension I10    Morbid obesity due to excess calories ([facility]) E66.01    Chest pain on breathing R07.1    Acute asthma exacerbation J45. 0    Hypertensive heart failure ([facility]) I11.0    Sleep apnea G47.30    COPD (chronic obstructive pulmonary disease) with chronic bronchitis ([facility]) J44.9    Acute bronchitis with chronic obstructive pulmonary disease (COPD) ([facility]) J44.0, J20.9    T wave inversion in EKG R94.31    COPD (chronic obstructive pulmonary disease) ([facility]) J44.9    CAP (community acquired pneumonia) J18.9    Hypertensive urgency I16.0    Tobacco use Z72.0       Patient alert and cooperative to use MDI: Yes    Home Respiratory Therapy Regimen/Frequency:  YES  Medication Alb Inhaler prn  Device  Frequency     SEVERITY INDEX: 1    ITEM 0 1 2 3 4 Score   Respiratory Pattern and or Rate Regular  10-19 Regular  20-24   24-30    30-34 Severe SOB or   Greater than 35 0   Breath Sounds Clear Occasional Wheeze Mild Wheezing Moderate Wheezing  wheezing/Absent breath sounds 1   Shortness of Breath None Dyspnea on Exertion Dyspnea at Rest Moderate Shortness of Breath at Rest Severe Shortness of Breath - Limited Speech 0       Total Score:  1    * Scoring Guidelines  0-4 pts:  PRN-BID   5-7 pts:  BID, TID, QID  8-9 pts:  TID, QID, Q6  10-12 pts:  Q4-Q6  * - Guidelines used with clinical judgement. PRN Treatments can be ordered to supplement scheduled treatments. Regardless of score, frequency should not be less than normal home regimen.     Recommended Order/Frequency:  Changed patient to q4 prn, pt is up walking around and clear now    Comments:          Respiratory Therapist: Siddharth Brizuela, RT

## 2019-10-02 NOTE — PROGRESS NOTES
Met with patient at bedside. Not medically cleared for discharge.   Plan remains home with home health    Kamila Carlisle RN BSN  Care Manager  [phone]

## 2019-10-02 NOTE — CONSULTS
Infectious Disease Consultation Note    Requested by:dr. Rolly Thompson    Reason: enterococcus bacteremia, possible pneumonia    Current abx Prior abx   Amp/sulbactam since 10/1 Cefepime, azithromycin, levofloxacin 9/28-10/1     Lines:       Assessment :    53 y.o. female with history of asthma, COPD admitted to [facility] for difficulty breathing. Now with enterococcus faeacalis bacteremia. Highly complex clinical picture. After obtaining a detailed history and exam; clinical presentation seems most c/w asthma/COPD exacerbation  Low clinical probability of pneumonia. No definitive infiltrate noted on cxr 9/28. dolly obtain repeat imaging to confirm. Also, single positive blood culture for enterococcus is likely contaminant. Will repeat blood cultures to [facility]. Recommendations:    1. Continue amp/sulbactam for now  2. Obtain cxr  3. Repeat blood culture  4. F/u pulmonary recommendations. Advance Care planning: discussed  with patient/surrogate decision maker: adrian Brand: [phone]    Thank you for consultation request. Above plan was discussed in details with patient, and dr Rolly Thompson. Please call me if any further questions or concerns. Will continue to participate in the care of this patient. HPI:    53 y.o. female with history of asthma, COPD admitted to [facility] for difficulty breathing. She was admitted to to [facility] on 9/28/19 for respiratory distress and possible pneumonia. She was treated with course of antibiotics. Patient with subsequent development of DEMARCUS. Pulmonary was consulted. It was felt that patient has asthma/copd versus aspiration pneumonitis. Blood cultures + for E. Faecalis. I was consulted for further recommendations.        Patient repots that she has several various inhalers and that when she gets into distress she started using all of them. She also reports that she was previously diagnosed with MY and hypoxia.  She states she use to have a home PAP device but that the device became infested with insects/bugs and that it became non-usable. She does not know where that device is. The patient reports that she is newly  and moved back with her mother and brother in housing near the [facility]. She states all 3 of them has respiratory difficulties. She reports her mother's home is badly infested with roaches and that the patient bought several cans of diaz spray and that she developed respiratory distress after inhaling the fumes from this spray. She just went down to [facility] ed to visit her mom who is in ed right now. She denies drug use. UDS in July 2019 + for opiates and barbiturates. She states that she smokes when she is stressed and that she smokes 1 pack every 2 weeks. Denies inhaling any other substances- specifically denies vaping THC and/or CBD products.     She states she feels much better today. She denies chest pain or hemoptysis. Her chest is still tight. She states that she has mostly dry cough. Denies subjective fever/malaise. Patient denies headaches, visual disturbances, sore throat, runny nose, earaches, cp, sob, chills, abdominal pain, diarrhea, burning micturition, pain or weakness in extremities. He denies back pain/flank pain. No known h/o MRSA colonization or infection in the past.       Past Medical History:   Diagnosis Date    Asthma     CHF (congestive heart failure) (HCC)     Chronic obstructive pulmonary disease (HCC)     Endocrine disease     thyroid issues    Gastrointestinal disorder     \"blockage in my stomach\"    Hypertension        No past surgical history on file.    home Medication List    Details   hydroCHLOROthiazide (HYDRODIURIL) 50 mg tablet Take 25 mg by mouth daily. QUEtiapine (SEROQUEL) 300 mg tablet Take 300 mg by mouth nightly. Indications: States she lost her daughter      albuterol sulfate 90 mcg/actuation aepb Take 2 Puffs by inhalation every four (4) hours as needed for Cough or Other (Wheezing).   Qty: 1 Inhaler, Refills: 0 fluticasone furoate-vilanterol (BREO ELLIPTA) 200-25 mcg/dose inhaler Take 1 Puff by inhalation daily. lisinopril (PRINIVIL, ZESTRIL) 20 mg tablet Take 20 mg by mouth daily. amLODIPine (NORVASC) 10 mg tablet Take 1 Tab by mouth daily. Qty: 30 Tab, Refills: 0      butalbital-acetaminophen-caff (FIORICET) -40 mg per capsule Take 1 Cap by mouth every four (4) hours as needed for Pain. Qty: 10 Cap, Refills: 0      arformoterol (BROVANA) 15 mcg/2 mL nebu neb solution 2 mL by Nebulization route two (2) times a day. Qty: 60 Vial, Refills: 0      budesonide (PULMICORT) 0.5 mg/2 mL nbsp 2 mL by Nebulization route two (2) times a day. Qty: 60 Each, Refills: 0      tiotropium (SPIRIVA) 18 mcg inhalation capsule Take 1 Cap by inhalation daily. Qty: 30 Cap, Refills: 0      aspirin 81 mg chewable tablet Take 1 Tab by mouth daily. Qty: 30 Tab, Refills: 0      atorvastatin (LIPITOR) 20 mg tablet Take 1 Tab by mouth nightly. Qty: 30 Tab, Refills: 0      cloNIDine HCl (CATAPRES) 0.2 mg tablet Take 1 Tab by mouth two (2) times a day. Qty: 60 Tab, Refills: 0      docusate sodium (COLACE) 100 mg capsule Take 1 Cap by mouth two (2) times a day. Qty: 60 Cap, Refills: 0      escitalopram oxalate (LEXAPRO) 10 mg tablet Take 1 Tab by mouth every evening. Qty: 30 Tab, Refills: 0      metoprolol tartrate (LOPRESSOR) 25 mg tablet Take 0.5 Tabs by mouth every twelve (12) hours. Qty: 30 Tab, Refills: 0      roflumilast (DALIRESP) 500 mcg tab tablet Take 1 Tab by mouth daily. Qty: 30 Tab, Refills: 0      !! OTHER Check CBC, CMP, Mg in 4 days, results to PCP immediately, Diagnosis- asthma  Qty: 1 Each, Refills: 0      !! OTHER Incentive spirometry- use as directed  Qty: 1 Each, Refills: 0      naloxone (NARCAN) 4 mg/actuation nasal spray Use 1 spray intranasally, then discard. Repeat with new spray every 2 min as needed for opioid overdose symptoms, alternating nostrils.   Qty: 1 Each, Refills: 0 budesonide-formoterol (SYMBICORT) 160-4.5 mcg/actuation HFAA Take 2 Puffs by inhalation two (2) times a day. Qty: 1 Inhaler, Refills: 1      ALPRAZolam (XANAX) 1 mg tablet Take 1 Tab by mouth two (2) times a day. Max Daily Amount: 2 mg. Qty: 20 Tab, Refills: 0      famotidine (PEPCID) 20 mg tablet Take 1 Tab by mouth daily. Qty: 30 Tab, Refills: 1      montelukast (SINGULAIR) 10 mg tablet Take 1 Tab by mouth nightly. Qty: 30 Tab, Refills: 1       !! - Potential duplicate medications found. Please discuss with provider.           Current Facility-Administered Medications   Medication Dose Route Frequency    [START ON 10/3/2019] predniSONE (DELTASONE) tablet 60 mg  60 mg Oral DAILY WITH BREAKFAST    methylPREDNISolone (PF) (Solu-MEDROL) injection 60 mg  60 mg IntraVENous Q12H    [START ON 10/3/2019] tiotropium (SPIRIVA) inhalation capsule 18 mcg  1 Cap Inhalation DAILY    arformoterol (BROVANA) neb solution 15 mcg  15 mcg Nebulization BID RT    albuterol (PROVENTIL VENTOLIN) nebulizer solution 2.5 mg  2.5 mg Nebulization Q4H PRN    ampicillin-sulbactam (UNASYN) 3 g in 0.9% sodium chloride (MBP/ADV) 100 mL MBP  3 g IntraVENous Q6H    polyethylene glycol (MIRALAX) packet 17 g  17 g Oral DAILY    budesonide (PULMICORT) 500 mcg/2 ml nebulizer suspension  500 mcg Nebulization BID RT    nicotine (NICODERM CQ) 14 mg/24 hr patch 1 Patch  1 Patch TransDERmal DAILY    alum-mag hydroxide-simeth (MYLANTA) oral suspension 30 mL  30 mL Oral Q4H PRN    amLODIPine (NORVASC) tablet 10 mg  10 mg Oral DAILY    aspirin chewable tablet 81 mg  81 mg Oral DAILY    atorvastatin (LIPITOR) tablet 20 mg  20 mg Oral QHS    cloNIDine HCl (CATAPRES) tablet 0.2 mg  0.2 mg Oral BID    escitalopram oxalate (LEXAPRO) tablet 10 mg  10 mg Oral QPM    famotidine (PEPCID) tablet 20 mg  20 mg Oral DAILY    [Held by provider] metoprolol tartrate (LOPRESSOR) tablet 12.5 mg  12.5 mg Oral Q12H    lisinopril (PRINIVIL, ZESTRIL) tablet 20 mg  20 mg Oral DAILY    roflumilast (DALIRESP) tablet 500 mcg  500 mcg Oral DAILY    naloxone (NARCAN) injection 0.4 mg  0.4 mg IntraVENous PRN    ondansetron (ZOFRAN) injection 4 mg  4 mg IntraVENous Q6H PRN    heparin (porcine) injection 5,000 Units  5,000 Units SubCUTAneous Q8H    acetaminophen (TYLENOL) tablet 650 mg  650 mg Oral Q6H PRN    hydrALAZINE (APRESOLINE) 20 mg/mL injection 10 mg  10 mg IntraVENous Q6H PRN    ALPRAZolam (XANAX) tablet 1 mg  1 mg Oral BID PRN    oxyCODONE-acetaminophen (PERCOCET) 5-325 mg per tablet 1-2 Tab  1-2 Tab Oral Q4H PRN    QUEtiapine (SEROquel) tablet 300 mg  300 mg Oral QHS    hydroCHLOROthiazide (HYDRODIURIL) tablet 25 mg  25 mg Oral DAILY       Allergies: Patient has no known allergies. No family history on file.   Social History     Socioeconomic History    Marital status: SINGLE     Spouse name: Not on file    Number of children: Not on file    Years of education: Not on file    Highest education level: Not on file   Occupational History    Not on file   Social Needs    Financial resource strain: Not on file    Food insecurity:     Worry: Not on file     Inability: Not on file    Transportation needs:     Medical: Not on file     Non-medical: Not on file   Tobacco Use    Smoking status: Current Every Day Smoker     Packs/day: 0.50    Smokeless tobacco: Former User   Substance and Sexual Activity    Alcohol use: No     Comment: socially    Drug use: Not Currently     Types: Heroin    Sexual activity: Never     Comment: last used 9 years ago   Lifestyle    Physical activity:     Days per week: Not on file     Minutes per session: Not on file    Stress: Not on file   Relationships    Social connections:     Talks on phone: Not on file     Gets together: Not on file     Attends Gnosticist service: Not on file     Active member of club or organization: Not on file     Attends meetings of clubs or organizations: Not on file     Relationship status: Not on file    Intimate partner violence:     Fear of current or ex partner: Not on file     Emotionally abused: Not on file     Physically abused: Not on file     Forced sexual activity: Not on file   Other Topics Concern    Not on file   Social History Narrative    Not on file     Social History     Tobacco Use   Smoking Status Current Every Day Smoker    Packs/day: 0.50   Smokeless Tobacco Former User        Temp (24hrs), Av.8 °F (36.6 °C), Min:97.1 °F (36.2 °C), Max:98.3 °F (36.8 °C)    Visit Vitals  /72 (BP 1 Location: Left arm, BP Patient Position: At rest)   Pulse 71   Temp 98.2 °F (36.8 °C)   Resp 18   Ht 4' 11.02\" (1.499 m)   Wt 90.6 kg (199 lb 11.2 oz)   LMP 2009   SpO2 94%   Breastfeeding? No   BMI 40.31 kg/m²       ROS: 12 point ROS obtained in details. Pertinent positives as mentioned in HPI,   otherwise negative    Physical Exam:    General: Well developed, well nourished female laying on the bed AAOx3 in no acute distress. General:   awake alert and oriented   HEENT:  Normocephalic, atraumatic, PERRL, EOMI, no scleral icterus or pallor; no conjunctival hemmohage;  nasal and oral mucous are moist and without evidence of lesions. Neck supple, no bruits. Lymph Nodes:   no cervical, axillary or inguinal adenopathy   Lungs:   non-labored, decreased breath sounds bilaterally. Bilateral wheezing. No rales   Heart:  RRR, s1 and s2; no rubs or gallops, no edema, + pedal pulses   Abdomen:  soft, non-distended, active bowel sounds, no hepatomegaly, no splenomegaly. on-tender   Genitourinary:  deferred   Extremities:   no clubbing, cyanosis; no joint effusions or swelling; Full ROM of all large joints to the upper and lower extremities; muscle mass appropriate for age   Neurologic:  No gross focal sensory abnormalities; 5/5 muscle strength to upper and lower extremities. Speech appropriate.  Cranial nerves intact                        Skin:  No rash or ulcers noted   Back:  no spinal or paraspinal muscle tenderness or rigidity, no CVA tenderness     Psychiatric:  No suicidal or homicidal ideations, appropriate mood and affect         Labs: Results:   Chemistry Recent Labs     10/02/19  0052 10/01/19  0425 09/30/19  0359   * 116* 132*    138 139   K 3.9 3.6 3.9    104 106   CO2 30 28 27   BUN 41* 26* 22*   CREA 2.09* 0.98 1.07   CA 8.3* 8.2* 8.5   AGAP 8 6 6   BUCR 20 27* 21*      CBC w/Diff Recent Labs     10/02/19  0052 10/01/19  0425 09/30/19  0359   WBC 10.8 9.2 11.8   RBC 4.35 4.42 4.37   HGB 12.7 13.1 12.8   HCT 39.7 39.7 39.5    216 215   GRANS 90* 91* 90*   LYMPH 6* 6* 8*   EOS 0 0 0      Microbiology No results for input(s): CULT in the last 72 hours.        RADIOLOGY:    All available imaging studies/reports in [facility] for this admission were reviewed    Dr. Zbigniew Rodriguez, Infectious Disease Specialist  [phone]  October 2, 2019  2:54 PM

## 2019-10-02 NOTE — PROGRESS NOTES
Problem: Activity Intolerance  Goal: *Oxygen saturation during activity within specified parameters  Outcome: Progressing Towards Goal  Goal: *Able to remain out of bed as prescribed  Outcome: Progressing Towards Goal     Problem: Patient Education: Go to Patient Education Activity  Goal: Patient/Family Education  Outcome: Progressing Towards Goal     Problem: Chronic Obstructive Pulmonary Disease (COPD)  Goal: *Oxygen saturation during activity within specified parameters  Outcome: Progressing Towards Goal  Goal: *Able to remain out of bed as prescribed  Outcome: Progressing Towards Goal  Goal: *Absence of hypoxia  Outcome: Progressing Towards Goal  Goal: *Optimize nutritional status  Outcome: Progressing Towards Goal     Problem: Patient Education: Go to Patient Education Activity  Goal: Patient/Family Education  Outcome: Progressing Towards Goal     Problem: Patient Education: Go to Patient Education Activity  Goal: Patient/Family Education  Outcome: Progressing Towards Goal     Problem: Falls - Risk of  Goal: *Absence of Falls  Description  Document Tish Aquino Fall Risk and appropriate interventions in the flowsheet.   Outcome: Progressing Towards Goal  Note:   Fall Risk Interventions:  Mobility Interventions: Communicate number of staff needed for ambulation/transfer         Medication Interventions: Bed/chair exit alarm, Assess postural VS orthostatic hypotension    Elimination Interventions: Call light in reach, Bed/chair exit alarm, Patient to call for help with toileting needs              Problem: Patient Education: Go to Patient Education Activity  Goal: Patient/Family Education  Outcome: Progressing Towards Goal     Problem: Nutrition Deficit  Goal: *Optimize nutritional status  Outcome: Progressing Towards Goal     Problem: Pneumonia: Day 2  Goal: Off Pathway (Use only if patient is Off Pathway)  Outcome: Progressing Towards Goal  Goal: Activity/Safety  Outcome: Progressing Towards Goal  Goal: Consults, if ordered  Outcome: Progressing Towards Goal  Goal: Diagnostic Test/Procedures  Outcome: Progressing Towards Goal  Goal: Nutrition/Diet  Outcome: Progressing Towards Goal  Goal: Discharge Planning  Outcome: Progressing Towards Goal  Goal: Medications  Outcome: Progressing Towards Goal  Goal: Respiratory  Outcome: Progressing Towards Goal  Goal: Treatments/Interventions/Procedures  Outcome: Progressing Towards Goal  Goal: Psychosocial  Outcome: Progressing Towards Goal  Goal: *Oxygen saturation within defined limits  Outcome: Progressing Towards Goal  Goal: *Hemodynamically stable  Outcome: Progressing Towards Goal  Goal: *Demonstrates progressive activity  Outcome: Progressing Towards Goal  Goal: *Tolerating diet  Outcome: Progressing Towards Goal  Goal: *Optimal pain control at patient's stated goal  Outcome: Progressing Towards Goal

## 2019-10-02 NOTE — ROUTINE PROCESS
Bedside and Verbal shift change report given to Jeremias Hernandez RN (oncoming nurse) by Lily Arias RN 
 (offgoing nurse). Report included the following information SBAR, Kardex, Intake/Output and MAR.

## 2019-10-02 NOTE — PROGRESS NOTES
[facility] Hospitalist Group  Progress Note    Patient: Serafin Epps Age: 53 y.o. : 1965 MR#: [id number] SSN: [id number]  Date: 10/2/2019     Subjective/24-hour events:     Pt seen & evaluated - is still SOB , coughing     Assessment:   COPD with acute exacerbation  Acute hypercapnic respiratory failure requiring BiPAP, resolved  Hypertensive urgency  Concern for pneumonia, doubt  Chronic prescription narcotic use   Obesity, BMI 39.4  Active smoker  Bacteremia - E fecalis     Plan:  Add pulmicort nebs. Continue IV steroid and bronchodilators as ordered. Some improvement in wheezing noted on exam today. Based on previous experience, patient is notoriously slow to improve. Pulm consulted today   Bronchodilator therapy and usual pulmonary hygiene as ordered. Bacteremia - ID consulted - continue IV Unasyn   BPs stable - continue current regimen. Needs to stop smoking - previously discussed. No plan to increase BZD or narcotic. Mobilize as able. Follow. Case discussed with:  [x]Patient  []Family  [x]Nursing  [x]Case Management  DVT Prophylaxis:  []Lovenox  [x]Hep SQ  []SCDs  []Coumadin   []On Heparin gtt    Objective:   VS:   Visit Vitals  /72 (BP 1 Location: Left arm, BP Patient Position: At rest)   Pulse 71   Temp 98.2 °F (36.8 °C)   Resp 18   Ht 4' 11.02\" (1.499 m)   Wt 90.6 kg (199 lb 11.2 oz)   LMP 2009   SpO2 94%   Breastfeeding? No   BMI 40.31 kg/m²      Tmax/24hrs: Temp (24hrs), Av.7 °F (36.5 °C), Min:97.1 °F (36.2 °C), Max:98.2 °F (36.8 °C)      Intake/Output Summary (Last 24 hours) at 10/2/2019 1552  Last data filed at 10/1/2019 1814  Gross per 24 hour   Intake 510 ml   Output    Net 510 ml       General:  In NAD. Nontoxic-appearing. Cardiovascular:  RRR. Pulmonary:  Still with diffuse wheezing bilaterally but improved relative to yesterday. GI:  Abdomen soft, NTTP. Extremities:  Warm, no ischemia.     Neuro:  Awake and alert, motor nonfocal.    Labs:    Recent Results (from the past 24 hour(s))   CBC WITH AUTOMATED DIFF    Collection Time: 10/02/19 12:52 AM   Result Value Ref Range    WBC 10.8 4.6 - 13.2 K/uL    RBC 4.35 4.20 - 5.30 M/uL    HGB 12.7 12.0 - 16.0 g/dL    HCT 39.7 35.0 - 45.0 %    MCV 91.3 74.0 - 97.0 FL    MCH 29.2 24.0 - 34.0 PG    MCHC 32.0 31.0 - 37.0 g/dL    RDW 14.0 11.6 - 14.5 %    PLATELET 914 849 - 267 K/uL    MPV 11.4 9.2 - 11.8 FL    NEUTROPHILS 90 (H) 40 - 73 %    LYMPHOCYTES 6 (L) 21 - 52 %    MONOCYTES 4 3 - 10 %    EOSINOPHILS 0 0 - 5 %    BASOPHILS 0 0 - 2 %    ABS. NEUTROPHILS 9.8 (H) 1.8 - 8.0 K/UL    ABS. LYMPHOCYTES 0.6 (L) 0.9 - 3.6 K/UL    ABS. MONOCYTES 0.4 0.05 - 1.2 K/UL    ABS. EOSINOPHILS 0.0 0.0 - 0.4 K/UL    ABS.  BASOPHILS 0.0 0.0 - 0.1 K/UL    DF AUTOMATED     METABOLIC PANEL, BASIC    Collection Time: 10/02/19 12:52 AM   Result Value Ref Range    Sodium 139 136 - 145 mmol/L    Potassium 3.9 3.5 - 5.5 mmol/L    Chloride 101 100 - 111 mmol/L    CO2 30 21 - 32 mmol/L    Anion gap 8 3.0 - 18 mmol/L    Glucose 131 (H) 74 - 99 mg/dL    BUN 41 (H) 7.0 - 18 MG/DL    Creatinine 2.09 (H) 0.6 - 1.3 MG/DL    BUN/Creatinine ratio 20 12 - 20      GFR est AA 30 (L) >60 ml/min/1.73m2    GFR est non-AA 25 (L) >60 ml/min/1.73m2    Calcium 8.3 (L) 8.5 - 10.1 MG/DL       Signed By: Harrison Castañeda MD     October 2, 2019

## 2019-10-02 NOTE — PROGRESS NOTES
Pt refuses to provide urine sample even after many attempts. She states \"I dont have to go to the bathroom\". Will continue to attempt collection.

## 2019-10-02 NOTE — CONSULTS
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 Faecalis- Group D- ID consulted  · H/O poor compliance  · H/O thyroid disorder        RECOMMENDATIONS:   · Keep HOB elevated- aspiration precautions  · Peak flows  · Please have Case Manger call [facility] to see what active orders they have  · D/C solumedrol and start Prednisone 60 mg daily with plan for taper  · Continue Seroquel  · Start Brovana and continue Pulmicort nebs  · Start Spiriva  · Check UDS  · Albuterol nebs PRN  · Patient on steroids- will try to add on IgE level from ED blood (without steroids) and also Allergy- Zone 2 panel  · Judicious use of sedating agents- would avoid benzodiazepines and opiates if at all possible  · SpO2 goal: 92-95  · Defer antibiotics to ID and primary team  · Consider nephrology consult  · Electrolyte , glycemia control and prophylaxis per primary team     Subjective/History: This patient has been seen and evaluated at the request of Dr. Tilman Paget for Asthma-COPD exacerbation. Patient is a 53 y.o. female who reports a history of asthma and COPD. She states that when she lived in [place], she was followed by a Dr. Heide Gatica for her pulmonary issues. She was admitted to for respiratory distress and possible pneumonia. She was treated with course of antibiotics. Patient with subsequent development of DEMARCUS. Blood cultures + for E.  Faecalis GrouP    Patient repots that she has several various inhalers and that when she gets into distress she started using all of them  - Albuterol, Flovent, Advair, Breo, etc.  She is not sure what exactly she has. She also reports that she was previously diagnosed with MY and hypoxia. She states she use to have a home PAP device but that the device became infested with insects/bugs and that it became non-usable. She does not know where that device is. She states that she was recently re-qualified for LTOT but that her current tanks are too heavy to use. She reports that she gets all her DME from [facility]    The patient reports that she is newly  and moved back with her mother and brother in housing near the [facility]. She states all 3 of them has respiratory difficulties. She reports her mother's home is badly infested with roaches and that the patient bought several cans of diaz spray and that she developed respiratory distress after inhaling the fumes from this spray. She denies drug use. UDS in July 2019 + for opiates and barbiturates. She states that she smokes when she is stressed and that she smokes 1 pack every 2 weeks. Denies inhaling any other substances- specifically denies vaping THC and/or CBD products. She states she feels much better today. She denies chest pain or hemoptysis. Her chest is still tight. Her sputum is lighter in color. She is tolerating PO but does report episode of emesis yesterday. She was previously followed in 2016/2017 by Dr. Ike Espitia -Pulmonary at [facility]- Some notes available for review. Notes refer to history of asthma/COPD, and VCD which responded well to SLP.  Patient pacing the halls during other hospitalizations,  missed appointments and reports of concern for possible malingering and pain seeking behaviors- Further details can be reviewed in care everywhere    PFTs from 2015 in Care Everywhere notes mixed restrictive and reversible obstruction and FeV1 of 0.95% (27% predicted)    Past Medical History:   Diagnosis Date    Asthma     CHF (congestive heart failure) ([facility] [address]     Chronic obstructive pulmonary disease (HCC)     Endocrine disease     thyroid issues    Gastrointestinal disorder     \"blockage in my stomach\"    Hypertension       No past surgical history on file. Prior to Admission medications    Medication Sig Start Date End Date Taking? Authorizing Provider   hydroCHLOROthiazide (HYDRODIURIL) 50 mg tablet Take 25 mg by mouth daily. Yes Provider, Historical   QUEtiapine (SEROQUEL) 300 mg tablet Take 300 mg by mouth nightly. Indications: States she lost her daughter   Yes Provider, Historical   albuterol sulfate 90 mcg/actuation aepb Take 2 Puffs by inhalation every four (4) hours as needed for Cough or Other (Wheezing). 7/25/19  Yes Kerri Henderson MD   fluticasone furoate-vilanterol (BREO ELLIPTA) 200-25 mcg/dose inhaler Take 1 Puff by inhalation daily. Yes Other, MD Shalini   lisinopril (PRINIVIL, ZESTRIL) 20 mg tablet Take 20 mg by mouth daily. Yes Other, MD Shalini   amLODIPine (NORVASC) 10 mg tablet Take 1 Tab by mouth daily. 7/5/19  Yes Luis Miguel Bowser MD   butalbital-acetaminophen-caff (FIORICET) -40 mg per capsule Take 1 Cap by mouth every four (4) hours as needed for Pain. 6/30/19  Yes Rolly Ruiz PA-C   arformoterol (BROVANA) 15 mcg/2 mL nebu neb solution 2 mL by Nebulization route two (2) times a day. 3/14/19  Yes Duke De La Cruz MD   budesonide (PULMICORT) 0.5 mg/2 mL nbsp 2 mL by Nebulization route two (2) times a day. 3/14/19  Yes Duke De La Cruz MD   tiotropium (SPIRIVA) 18 mcg inhalation capsule Take 1 Cap by inhalation daily. 3/14/19  Yes Duke De La Cruz MD   aspirin 81 mg chewable tablet Take 1 Tab by mouth daily. 3/15/19  Yes Duke De La Cruz MD   atorvastatin (LIPITOR) 20 mg tablet Take 1 Tab by mouth nightly. 3/14/19  Yes Duke De La Cruz MD   cloNIDine HCl (CATAPRES) 0.2 mg tablet Take 1 Tab by mouth two (2) times a day.  3/14/19  Yes Duke De La Cruz MD   docusate sodium (COLACE) 100 mg capsule Take 1 Cap by mouth two (2) times a day. 3/14/19  Yes Fracisco Garcia MD   escitalopram oxalate (LEXAPRO) 10 mg tablet Take 1 Tab by mouth every evening. 3/14/19  Yes Fracisco Garcia MD   metoprolol tartrate (LOPRESSOR) 25 mg tablet Take 0.5 Tabs by mouth every twelve (12) hours. 3/14/19  Yes Fracisco Garcia MD   roflumilast (DALIRESP) 500 mcg tab tablet Take 1 Tab by mouth daily. 3/15/19  Yes Fracisco Garcia MD   OTHER Check CBC, CMP, Mg in 4 days, results to PCP immediately, Diagnosis- asthma 3/14/19  Yes Fracisco Garcia MD   OTHER Incentive spirometry- use as directed 3/14/19  Yes Fracisco Garcia MD   naloxone [facility]) 4 mg/actuation nasal spray Use 1 spray intranasally, then discard. Repeat with new spray every 2 min as needed for opioid overdose symptoms, alternating nostrils. 3/14/19  Yes Fracisco Garcia MD   budesonide-formoterol (SYMBICORT) 160-4.5 mcg/actuation HFAA Take 2 Puffs by inhalation two (2) times a day. 8/23/18  Yes Loretta Claudio,    ALPRAZolam [employer]) 1 mg tablet Take 1 Tab by mouth two (2) times a day. Max Daily Amount: 2 mg. 4/24/17  Yes Linda Bacon MD   famotidine (PEPCID) 20 mg tablet Take 1 Tab by mouth daily. 3/9/17  Yes Curtis Medellin MD   montelukast (SINGULAIR) 10 mg tablet Take 1 Tab by mouth nightly.  3/9/17  Yes Curtis Medellin MD     Current Facility-Administered Medications   Medication Dose Route Frequency    ampicillin-sulbactam (UNASYN) 3 g in 0.9% sodium chloride (MBP/ADV) 100 mL MBP  3 g IntraVENous Q6H    polyethylene glycol (MIRALAX) packet 17 g  17 g Oral DAILY    budesonide (PULMICORT) 500 mcg/2 ml nebulizer suspension  500 mcg Nebulization BID RT    nicotine (NICODERM CQ) 14 mg/24 hr patch 1 Patch  1 Patch TransDERmal DAILY    methylPREDNISolone (PF) (Solu-MEDROL) injection 60 mg  60 mg IntraVENous Q6H    amLODIPine (NORVASC) tablet 10 mg  10 mg Oral DAILY    aspirin chewable tablet 81 mg  81 mg Oral DAILY    atorvastatin (LIPITOR) tablet 20 mg  20 mg Oral QHS    cloNIDine HCl (CATAPRES) tablet 0.2 mg  0.2 mg Oral BID    escitalopram oxalate (LEXAPRO) tablet 10 mg  10 mg Oral QPM    famotidine (PEPCID) tablet 20 mg  20 mg Oral DAILY    [Held by provider] metoprolol tartrate (LOPRESSOR) tablet 12.5 mg  12.5 mg Oral Q12H    lisinopril (PRINIVIL, ZESTRIL) tablet 20 mg  20 mg Oral DAILY    roflumilast (DALIRESP) tablet 500 mcg  500 mcg Oral DAILY    heparin (porcine) injection 5,000 Units  5,000 Units SubCUTAneous Q8H    QUEtiapine (SEROquel) tablet 300 mg  300 mg Oral QHS    hydroCHLOROthiazide (HYDRODIURIL) tablet 25 mg  25 mg Oral DAILY     No Known Allergies   Social History     Tobacco Use    Smoking status: Current Every Day Smoker     Packs/day: 0.50    Smokeless tobacco: Former User   Substance Use Topics    Alcohol use: No     Comment: socially      No family history on file. Review of Systems:  Pertinent items are noted in HPI. Objective:   Vital Signs:    Visit Vitals  /72 (BP 1 Location: Left arm, BP Patient Position: At rest)   Pulse 71   Temp 98.2 °F (36.8 °C)   Resp 18   Ht 4' 11.02\" (1.499 m)   Wt 90.6 kg (199 lb 11.2 oz)   LMP 2009   SpO2 94%   Breastfeeding? No   BMI 40.31 kg/m²       O2 Device: Room air   O2 Flow Rate (L/min): 3 l/min   Temp (24hrs), Av.8 °F (36.6 °C), Min:97.1 °F (36.2 °C), Max:98.3 °F (36.8 °C)       Intake/Output:   Last shift:      No intake/output data recorded. Last 3 shifts:  1901 - 10/02 0700  In: 7339 [P.O.:1200; I.V.:510]  Out: -     Intake/Output Summary (Last 24 hours) at 10/2/2019 1255  Last data filed at 10/1/2019 1814  Gross per 24 hour   Intake 510 ml   Output    Net 510 ml       Physical Exam:    General:  Alert, cooperative, no distress, appears stated age. Good vocal quality   Head:  Normocephalic, without obvious abnormality, atraumatic. Eyes:  Conjunctivae/corneas clear. PERRL, EOMs intact. Nose: Nares normal. Septum midline. Mucosa normal. No drainage or sinus tenderness. Throat: Lips, mucosa. Some missing teeth-no exudates, large tongue, Mallampati 3   Neck: Supple, symmetrical, trachea midline, no adenopathy, thyroid: no enlargment/tenderness/nodules, no carotid bruit and no JVD. Back:   Symmetric    Lungs:   Diffuse bilateral wheezing, + wet cough, no stridor   Chest wall:  No tenderness or deformity. Heart:  Regular rate and rhythm, S1, S2 normal, no murmur, click, rub or gallop. Abdomen:   Soft, non-tender. Bowel sounds normal. No masses,  No organomegaly. Extremities: Extremities normal, atraumatic, no cyanosis or edema. Pulses: 2+ and symmetric all extremities. Skin: Skin color, texture, turgor normal. No rashes or lesions   Lymph nodes:      Cervical, supraclavicular nodes- unremarkable   Neurologic: Grossly nonfocal       Data:     Recent Results (from the past 24 hour(s))   CBC WITH AUTOMATED DIFF    Collection Time: 10/02/19 12:52 AM   Result Value Ref Range    WBC 10.8 4.6 - 13.2 K/uL    RBC 4.35 4.20 - 5.30 M/uL    HGB 12.7 12.0 - 16.0 g/dL    HCT 39.7 35.0 - 45.0 %    MCV 91.3 74.0 - 97.0 FL    MCH 29.2 24.0 - 34.0 PG    MCHC 32.0 31.0 - 37.0 g/dL    RDW 14.0 11.6 - 14.5 %    PLATELET 180 499 - 129 K/uL    MPV 11.4 9.2 - 11.8 FL    NEUTROPHILS 90 (H) 40 - 73 %    LYMPHOCYTES 6 (L) 21 - 52 %    MONOCYTES 4 3 - 10 %    EOSINOPHILS 0 0 - 5 %    BASOPHILS 0 0 - 2 %    ABS. NEUTROPHILS 9.8 (H) 1.8 - 8.0 K/UL    ABS. LYMPHOCYTES 0.6 (L) 0.9 - 3.6 K/UL    ABS. MONOCYTES 0.4 0.05 - 1.2 K/UL    ABS. EOSINOPHILS 0.0 0.0 - 0.4 K/UL    ABS.  BASOPHILS 0.0 0.0 - 0.1 K/UL    DF AUTOMATED     METABOLIC PANEL, BASIC    Collection Time: 10/02/19 12:52 AM   Result Value Ref Range    Sodium 139 136 - 145 mmol/L    Potassium 3.9 3.5 - 5.5 mmol/L    Chloride 101 100 - 111 mmol/L    CO2 30 21 - 32 mmol/L    Anion gap 8 3.0 - 18 mmol/L    Glucose 131 (H) 74 - 99 mg/dL    BUN 41 (H) 7.0 - 18 MG/DL    Creatinine 2.09 (H) 0.6 - 1.3 MG/DL    BUN/Creatinine ratio 20 12 - 20      GFR est AA 30 (L) >60 ml/min/1.73m2    GFR est non-AA 25 (L) >60 ml/min/1.73m2    Calcium 8.3 (L) 8.5 - 10.1 MG/DL           No results for input(s): FIO2I, IFO2, HCO3I, IHCO3, HCOPOC, PCO2I, PCOPOC, IPHI, PHI, PHPOC, PO2I, PO2POC in the last 72 hours. No lab exists for component: IPOC2     Lab Results   Component Value Date/Time    BNP 19.9 12/07/2015 05:23 PM    BNP 3.4 11/01/2015 03:20 PM    NT pro- 09/27/2019 10:50 PM    NT pro-BNP 96 07/26/2019 10:35 AM    NT pro- 04/14/2019 08:41 PM    NT pro- 03/09/2019 05:23 PM    NT pro- 12/19/2017 08:45 AM     Lab Results   Component Value Date/Time    TSH 0.18 (L) 09/27/2019 10:50 PM    Triiodothyronine (T3), free 2.3 11/19/2015 06:15 AM    T4, Free 1.1 09/27/2019 10:50 PM     Lab Results   Component Value Date/Time    Hemoglobin A1c 4.5 09/28/2019 03:49 AM            Imaging:  I have personally reviewed the patients radiographs and have reviewed the reports:  CXR Results  (Last 48 hours)    None            CT Results (most recent):  Results from Hospital Encounter encounter on 07/05/19   CT HEAD WO CONT    Narrative EXAM: CT HEAD W/O CONTRAST    INDICATION: Syncope, difficulty speaking    COMPARISON: CT head 6/30/2019    TECHNIQUE: Axial CT imaging of the head was performed without intravenous  contrast.  Additional coronal and sagittal reconstructions were performed. One  or more dose reduction techniques were used on this CT: automated exposure  control, adjustment of the mAs and/or kVp according to patient's size, and  iterative reconstruction techniques. The specific techniques utilized on this CT  exam have been documented in the patient's electronic medical record.  Digital  Imaging and Communications in Medicine (DICOM) format image data are available  to nonaffiliated external healthcare facilities or entities on a secure, media  free, reciprocally searchable basis with patient authorization for at least a  12-month period after this study.  _______________    FINDINGS:    VENTRICLES/EXTRA-AXIAL SPACES: The ventricles and sulci are normal in their size  and configuration. BRAIN PARENCHYMA: There is no evidence of acute intracranial hemorrhage, mass  effect, midline shift, or herniation. No definite CT evidence of acute cortical  infarct is seen. A minimal amount of hypodense white matter lesions are seen  within the periventricular and subcortical white matter which are nonspecific,  but likely represent chronic small vessel changes. ORBITS: The visualized orbits are unremarkable. PARANASAL SINUSES: Visualized paranasal sinuses are clear. MASTOID AIR CELLS: Visualized mastoid air cells are clear. OSSEOUS STRUCTURES: No fracture is seen. OTHER: None.      _______________      Impression IMPRESSION:    1. No acute intracranial hemorrhage, mass effect, midline shift, or herniation. No definite CT evidence of acute cortical infarct is seen. Please note that  noncontrast head CT may be normal in early acute infarct. 2. Stable, minimal nonspecific white matter disease likely representing chronic  small vessel changes. CRITICAL RESULT:    These critical results on this CODE S patient were directly communicated to Dr. Brian Moses on 7/5/2019 9:43 AM.  Results understood and acknowledged.                 Total Clinical care time exclusive of procedures: 60 minutes      Elena Catalan DO, WINSTONP    [facility]  Pulmonary, Critical Care, and Sleep Medicine

## 2019-10-03 ENCOUNTER — APPOINTMENT (OUTPATIENT)
Dept: GENERAL RADIOLOGY | Age: 54
DRG: 140 | End: 2019-10-03
Attending: INTERNAL MEDICINE
Payer: MEDICAID

## 2019-10-03 LAB
ANION GAP SERPL CALC-SCNC: 7 MMOL/L (ref 3–18)
BUN SERPL-MCNC: 53 MG/DL (ref 7–18)
BUN/CREAT SERPL: 23 (ref 12–20)
CALCIUM SERPL-MCNC: 8 MG/DL (ref 8.5–10.1)
CHLORIDE SERPL-SCNC: 101 MMOL/L (ref 100–111)
CO2 SERPL-SCNC: 29 MMOL/L (ref 21–32)
CREAT SERPL-MCNC: 2.28 MG/DL (ref 0.6–1.3)
GLUCOSE BLD STRIP.AUTO-MCNC: 114 MG/DL (ref 70–110)
GLUCOSE SERPL-MCNC: 150 MG/DL (ref 74–99)
POTASSIUM SERPL-SCNC: 3.5 MMOL/L (ref 3.5–5.5)
SODIUM SERPL-SCNC: 137 MMOL/L (ref 136–145)

## 2019-10-03 PROCEDURE — 74011250637 HC RX REV CODE- 250/637: Performed by: INTERNAL MEDICINE

## 2019-10-03 PROCEDURE — 74011000258 HC RX REV CODE- 258: Performed by: HOSPITALIST

## 2019-10-03 PROCEDURE — 84300 ASSAY OF URINE SODIUM: CPT

## 2019-10-03 PROCEDURE — 74011250636 HC RX REV CODE- 250/636: Performed by: INTERNAL MEDICINE

## 2019-10-03 PROCEDURE — 74011250637 HC RX REV CODE- 250/637: Performed by: HOSPITALIST

## 2019-10-03 PROCEDURE — 71045 X-RAY EXAM CHEST 1 VIEW: CPT

## 2019-10-03 PROCEDURE — 80048 BASIC METABOLIC PNL TOTAL CA: CPT

## 2019-10-03 PROCEDURE — 74011250636 HC RX REV CODE- 250/636: Performed by: HOSPITALIST

## 2019-10-03 PROCEDURE — 82962 GLUCOSE BLOOD TEST: CPT

## 2019-10-03 PROCEDURE — 74011000250 HC RX REV CODE- 250: Performed by: INTERNAL MEDICINE

## 2019-10-03 PROCEDURE — 65660000000 HC RM CCU STEPDOWN

## 2019-10-03 PROCEDURE — 74011636637 HC RX REV CODE- 636/637: Performed by: INTERNAL MEDICINE

## 2019-10-03 PROCEDURE — 36415 COLL VENOUS BLD VENIPUNCTURE: CPT

## 2019-10-03 PROCEDURE — 94660 CPAP INITIATION&MGMT: CPT

## 2019-10-03 PROCEDURE — 74011250637 HC RX REV CODE- 250/637: Performed by: FAMILY MEDICINE

## 2019-10-03 PROCEDURE — 74011000250 HC RX REV CODE- 250: Performed by: FAMILY MEDICINE

## 2019-10-03 PROCEDURE — 94640 AIRWAY INHALATION TREATMENT: CPT

## 2019-10-03 RX ORDER — SODIUM CHLORIDE 9 MG/ML
100 INJECTION, SOLUTION INTRAVENOUS CONTINUOUS
Status: DISCONTINUED | OUTPATIENT
Start: 2019-10-03 | End: 2019-10-03

## 2019-10-03 RX ORDER — OXYCODONE AND ACETAMINOPHEN 5; 325 MG/1; MG/1
1 TABLET ORAL
Status: DISCONTINUED | OUTPATIENT
Start: 2019-10-03 | End: 2019-10-04

## 2019-10-03 RX ORDER — SODIUM CHLORIDE 9 MG/ML
100 INJECTION, SOLUTION INTRAVENOUS CONTINUOUS
Status: DISPENSED | OUTPATIENT
Start: 2019-10-03 | End: 2019-10-04

## 2019-10-03 RX ADMIN — BUDESONIDE 500 MCG: 0.5 INHALANT RESPIRATORY (INHALATION) at 20:46

## 2019-10-03 RX ADMIN — PREDNISONE 60 MG: 20 TABLET ORAL at 09:54

## 2019-10-03 RX ADMIN — ATORVASTATIN CALCIUM 20 MG: 20 TABLET, FILM COATED ORAL at 21:27

## 2019-10-03 RX ADMIN — ARFORMOTEROL TARTRATE 15 MCG: 15 SOLUTION RESPIRATORY (INHALATION) at 20:46

## 2019-10-03 RX ADMIN — SODIUM CHLORIDE 100 ML/HR: 900 INJECTION, SOLUTION INTRAVENOUS at 10:00

## 2019-10-03 RX ADMIN — AMPICILLIN SODIUM AND SULBACTAM SODIUM 3 G: 2; 1 INJECTION, POWDER, FOR SOLUTION INTRAMUSCULAR; INTRAVENOUS at 04:31

## 2019-10-03 RX ADMIN — OXYCODONE HYDROCHLORIDE AND ACETAMINOPHEN 1 TABLET: 5; 325 TABLET ORAL at 09:54

## 2019-10-03 RX ADMIN — CLONIDINE HYDROCHLORIDE 0.2 MG: 0.1 TABLET ORAL at 09:54

## 2019-10-03 RX ADMIN — ESCITALOPRAM OXALATE 10 MG: 10 TABLET ORAL at 17:58

## 2019-10-03 RX ADMIN — ACETAMINOPHEN 650 MG: 325 TABLET ORAL at 03:30

## 2019-10-03 RX ADMIN — SODIUM CHLORIDE 100 ML/HR: 900 INJECTION, SOLUTION INTRAVENOUS at 17:58

## 2019-10-03 RX ADMIN — LISINOPRIL 20 MG: 20 TABLET ORAL at 09:54

## 2019-10-03 RX ADMIN — ARFORMOTEROL TARTRATE 15 MCG: 15 SOLUTION RESPIRATORY (INHALATION) at 08:47

## 2019-10-03 RX ADMIN — ALPRAZOLAM 1 MG: 0.5 TABLET ORAL at 08:18

## 2019-10-03 RX ADMIN — OXYCODONE HYDROCHLORIDE AND ACETAMINOPHEN 1 TABLET: 5; 325 TABLET ORAL at 15:16

## 2019-10-03 RX ADMIN — HYDROCHLOROTHIAZIDE 25 MG: 25 TABLET ORAL at 09:54

## 2019-10-03 RX ADMIN — ROFLUMILAST 500 MCG: 500 TABLET ORAL at 09:54

## 2019-10-03 RX ADMIN — ASPIRIN 81 MG 81 MG: 81 TABLET ORAL at 09:54

## 2019-10-03 RX ADMIN — AMLODIPINE BESYLATE 10 MG: 10 TABLET ORAL at 09:54

## 2019-10-03 RX ADMIN — FAMOTIDINE 20 MG: 20 TABLET ORAL at 09:54

## 2019-10-03 RX ADMIN — HEPARIN SODIUM 5000 UNITS: 5000 INJECTION INTRAVENOUS; SUBCUTANEOUS at 02:42

## 2019-10-03 RX ADMIN — HEPARIN SODIUM 5000 UNITS: 5000 INJECTION INTRAVENOUS; SUBCUTANEOUS at 17:58

## 2019-10-03 RX ADMIN — CLONIDINE HYDROCHLORIDE 0.2 MG: 0.1 TABLET ORAL at 17:58

## 2019-10-03 RX ADMIN — BUDESONIDE 500 MCG: 0.5 INHALANT RESPIRATORY (INHALATION) at 08:47

## 2019-10-03 RX ADMIN — OXYCODONE HYDROCHLORIDE AND ACETAMINOPHEN 1 TABLET: 5; 325 TABLET ORAL at 21:27

## 2019-10-03 RX ADMIN — QUETIAPINE 300 MG: 100 TABLET ORAL at 21:27

## 2019-10-03 RX ADMIN — ALPRAZOLAM 1 MG: 0.5 TABLET ORAL at 18:21

## 2019-10-03 RX ADMIN — TIOTROPIUM BROMIDE 18 MCG: 18 CAPSULE ORAL; RESPIRATORY (INHALATION) at 08:47

## 2019-10-03 RX ADMIN — HEPARIN SODIUM 5000 UNITS: 5000 INJECTION INTRAVENOUS; SUBCUTANEOUS at 09:55

## 2019-10-03 NOTE — CONSULTS
Consult Note  Consult requested by: Dr. Shay Dunne is a 53 y.o. female [address] who is being seen on consult for DEMARCUS  Chief Complaint   Patient presents with    Respiratory Distress     Admission diagnosis: Acute respiratory failure with hypercapnia ([facility] [address]     48y F with PMH HTN, COPD, HFpEF, admitted for COPD exacerbation, seen for renal failure  Impression & Plan:   IMPRESSION:   DEMARCUS, NON oliguric, ATN vs AIN, creatinine jumped from 2nd October , Diffrential would be ATN from nephrotoxicity from meds such as  she has been receiving hctz, lisinopril since admission. She recievied lasix and ibuprofen on admission. Or AIN from amoxicillin, sepsis   Rising BUN, renal failure as well as on steroid  COPD exacerbation,   HTN  HFpEF  Sepsis, bacteremia, e.fecalis, repeat culture negative   PLAN:    Ms. Tonya Lay has multiple risk factor for DEMARCUS as mention above her lisinopril  hctz and antibiotics have been discontinued, avoid nsadis and diuretics for now. I agree with  NS 1L for now, monitor response. Check bladder scan. Check urine lytes. Adjust medication per current renal function status. Discussed with Dr. Jayna Wright  Thank you very much for allowing me to participate in the care of this patient. We will continue to monitor with you and make recommendations as needed.         HPI: 48y F with PMH COPD, HTN, heart failure, presented to ER with repiratory distress. In ER her work up shows COPD exacerbation with possible pneumonia. She admitted to floor and started treatment for it. She was evaluated by pulmonary colleauge. Her creatinine was at 0.9 mg/dl on admission, it jumped to 2.2mg/dl today , enphrology servoce onsulsted for further assistance. During my evaluation, she denies for any history of kidney disease in past. NO history of gout, kidney stone, gross hematuria. She frequently use nsaids for her chronic pain.        She has known history of COPD, still continue to smoke. She has heart failure and on hctz at home. Past Medical History:   Diagnosis Date    Asthma     CHF (congestive heart failure) (HCC)     Chronic obstructive pulmonary disease (HCC)     Endocrine disease     thyroid issues    Gastrointestinal disorder     \"blockage in my stomach\"    Hypertension       No past surgical history on file. Social History     Socioeconomic History    Marital status: SINGLE     Spouse name: Not on file    Number of children: Not on file    Years of education: Not on file    Highest education level: Not on file   Occupational History    Not on file   Social Needs    Financial resource strain: Not on file    Food insecurity:     Worry: Not on file     Inability: Not on file    Transportation needs:     Medical: Not on file     Non-medical: Not on file   Tobacco Use    Smoking status: Current Every Day Smoker     Packs/day: 0.50    Smokeless tobacco: Former User   Substance and Sexual Activity    Alcohol use: No     Comment: socially    Drug use: Not Currently     Types: Heroin    Sexual activity: Never     Comment: last used 9 years ago   Lifestyle    Physical activity:     Days per week: Not on file     Minutes per session: Not on file    Stress: Not on file   Relationships    Social connections:     Talks on phone: Not on file     Gets together: Not on file     Attends Religion service: Not on file     Active member of club or organization: Not on file     Attends meetings of clubs or organizations: Not on file     Relationship status: Not on file    Intimate partner violence:     Fear of current or ex partner: Not on file     Emotionally abused: Not on file     Physically abused: Not on file     Forced sexual activity: Not on file   Other Topics Concern    Not on file   Social History Narrative    Not on file       No family history on file.   No Known Allergies     Home Medications:     Prior to Admission Medications   Prescriptions Last Dose Informant Patient Reported? Taking? ALPRAZolam (XANAX) 1 mg tablet 2019 at Unknown time  No Yes   Sig: Take 1 Tab by mouth two (2) times a day. Max Daily Amount: 2 mg. OTHER 2019 at Unknown time  No Yes   Sig: Check CBC, CMP, Mg in 4 days, results to PCP immediately, Diagnosis- asthma   OTHER 2019 at Unknown time  No Yes   Sig: Incentive spirometry- use as directed   QUEtiapine (SEROQUEL) 300 mg tablet   Yes Yes   Sig: Take 300 mg by mouth nightly. Indications: States she lost her daughter   albuterol sulfate 90 mcg/actuation aepb 2019 at Unknown time  No Yes   Sig: Take 2 Puffs by inhalation every four (4) hours as needed for Cough or Other (Wheezing). amLODIPine (NORVASC) 10 mg tablet 2019 at Unknown time  No Yes   Sig: Take 1 Tab by mouth daily. arformoterol (BROVANA) 15 mcg/2 mL nebu neb solution 2019 at Unknown time  No Yes   Si mL by Nebulization route two (2) times a day. aspirin 81 mg chewable tablet 2019 at Unknown time  No Yes   Sig: Take 1 Tab by mouth daily. atorvastatin (LIPITOR) 20 mg tablet 2019 at Unknown time  No Yes   Sig: Take 1 Tab by mouth nightly. budesonide (PULMICORT) 0.5 mg/2 mL nbsp 2019 at Unknown time  No Yes   Si mL by Nebulization route two (2) times a day. budesonide-formoterol (SYMBICORT) 160-4.5 mcg/actuation HFAA 2019 at Unknown time  No Yes   Sig: Take 2 Puffs by inhalation two (2) times a day. butalbital-acetaminophen-caff (FIORICET) -40 mg per capsule 2019 at Unknown time  No Yes   Sig: Take 1 Cap by mouth every four (4) hours as needed for Pain. cloNIDine HCl (CATAPRES) 0.2 mg tablet 2019 at Unknown time  No Yes   Sig: Take 1 Tab by mouth two (2) times a day. docusate sodium (COLACE) 100 mg capsule 2019 at Unknown time  No Yes   Sig: Take 1 Cap by mouth two (2) times a day.    escitalopram oxalate (LEXAPRO) 10 mg tablet 2019 at Unknown time  No Yes   Sig: Take 1 Tab by mouth every evening. famotidine (PEPCID) 20 mg tablet 9/21/2019 at Unknown time  No Yes   Sig: Take 1 Tab by mouth daily. fluticasone furoate-vilanterol (BREO ELLIPTA) 200-25 mcg/dose inhaler 9/21/2019 at Unknown time  Yes Yes   Sig: Take 1 Puff by inhalation daily. hydroCHLOROthiazide (HYDRODIURIL) 50 mg tablet 9/21/2019 at Unknown time  Yes Yes   Sig: Take 25 mg by mouth daily. lisinopril (PRINIVIL, ZESTRIL) 20 mg tablet 9/21/2019 at Unknown time  Yes Yes   Sig: Take 20 mg by mouth daily. metoprolol tartrate (LOPRESSOR) 25 mg tablet 9/21/2019 at Unknown time  No Yes   Sig: Take 0.5 Tabs by mouth every twelve (12) hours. montelukast (SINGULAIR) 10 mg tablet 9/21/2019 at Unknown time  No Yes   Sig: Take 1 Tab by mouth nightly. naloxone [facility]) 4 mg/actuation nasal spray 9/21/2019 at Unknown time  No Yes   Sig: Use 1 spray intranasally, then discard. Repeat with new spray every 2 min as needed for opioid overdose symptoms, alternating nostrils. roflumilast (DALIRESP) 500 mcg tab tablet 9/21/2019 at Unknown time  No Yes   Sig: Take 1 Tab by mouth daily. tiotropium (SPIRIVA) 18 mcg inhalation capsule 9/21/2019 at Unknown time  No Yes   Sig: Take 1 Cap by inhalation daily.       Facility-Administered Medications: None       Current Facility-Administered Medications   Medication Dose Route Frequency    0.9% sodium chloride infusion  100 mL/hr IntraVENous CONTINUOUS    oxyCODONE-acetaminophen (PERCOCET) 5-325 mg per tablet 1 Tab  1 Tab Oral Q6H PRN    predniSONE (DELTASONE) tablet 60 mg  60 mg Oral DAILY WITH BREAKFAST    tiotropium (SPIRIVA) inhalation capsule 18 mcg  1 Cap Inhalation DAILY    arformoterol (BROVANA) neb solution 15 mcg  15 mcg Nebulization BID RT    albuterol (PROVENTIL VENTOLIN) nebulizer solution 2.5 mg  2.5 mg Nebulization Q4H PRN    polyethylene glycol (MIRALAX) packet 17 g  17 g Oral DAILY    budesonide (PULMICORT) 500 mcg/2 ml nebulizer suspension  500 mcg Nebulization BID RT  nicotine (NICODERM CQ) 14 mg/24 hr patch 1 Patch  1 Patch TransDERmal DAILY    alum-mag hydroxide-simeth (MYLANTA) oral suspension 30 mL  30 mL Oral Q4H PRN    amLODIPine (NORVASC) tablet 10 mg  10 mg Oral DAILY    aspirin chewable tablet 81 mg  81 mg Oral DAILY    atorvastatin (LIPITOR) tablet 20 mg  20 mg Oral QHS    cloNIDine HCl (CATAPRES) tablet 0.2 mg  0.2 mg Oral BID    escitalopram oxalate (LEXAPRO) tablet 10 mg  10 mg Oral QPM    famotidine (PEPCID) tablet 20 mg  20 mg Oral DAILY    [Held by provider] metoprolol tartrate (LOPRESSOR) tablet 12.5 mg  12.5 mg Oral Q12H    roflumilast (DALIRESP) tablet 500 mcg  500 mcg Oral DAILY    naloxone (NARCAN) injection 0.4 mg  0.4 mg IntraVENous PRN    ondansetron (ZOFRAN) injection 4 mg  4 mg IntraVENous Q6H PRN    heparin (porcine) injection 5,000 Units  5,000 Units SubCUTAneous Q8H    acetaminophen (TYLENOL) tablet 650 mg  650 mg Oral Q6H PRN    hydrALAZINE (APRESOLINE) 20 mg/mL injection 10 mg  10 mg IntraVENous Q6H PRN    ALPRAZolam (XANAX) tablet 1 mg  1 mg Oral BID PRN    QUEtiapine (SEROquel) tablet 300 mg  300 mg Oral QHS       Review of Systems:     Complete 10-point review of systems were obtained and discussed in length  with the patient. Complete review of systems was negative/unremarkable  except as mentioned in HPI section. Data Review:    Labs: Results:       Chemistry Recent Labs     10/03/19  0941 10/02/19  0052 10/01/19  0425   * 131* 116*    139 138   K 3.5 3.9 3.6    101 104   CO2 29 30 28   BUN 53* 41* 26*   CREA 2.28* 2.09* 0.98   CA 8.0* 8.3* 8.2*   AGAP 7 8 6   BUCR 23* 20 27*      CBC w/Diff Recent Labs     10/02/19  0052 10/01/19  0425   WBC 10.8 9.2   RBC 4.35 4.42   HGB 12.7 13.1   HCT 39.7 39.7    216   GRANS 90* 91*   LYMPH 6* 6*   EOS 0 0      Coagulation No results for input(s): PTP, INR, APTT, INREXT in the last 72 hours.     Iron/Ferritin No results for input(s): IRON in the last 72 hours.    No lab exists for component: TIBCCALC   BNP No results for input(s): BNPP in the last 72 hours. Cardiac Enzymes No results for input(s): CPK, CKND1, JOSE in the last 72 hours. No lab exists for component: CKRMB, TROIP   Liver Enzymes No results for input(s): TP, ALB, TBIL, AP, SGOT, GPT in the last 72 hours. No lab exists for component: DBIL   Thyroid Studies Lab Results   Component Value Date/Time    TSH 0.18 (L) 09/27/2019 10:50 PM         EKG: sinus   Physical Assessment:     Visit Vitals  /64 (BP 1 Location: Right arm, BP Patient Position: At rest)   Pulse 75   Temp 97 °F (36.1 °C)   Resp 18   Ht 4' 11.02\" (1.499 m)   Wt 91.3 kg (201 lb 3.2 oz)   SpO2 97%   Breastfeeding?  No   BMI 40.62 kg/m²     Weight change: 0.68 kg (1 lb 8 oz)    Intake/Output Summary (Last 24 hours) at 10/3/2019 1650  Last data filed at 10/2/2019 1846  Gross per 24 hour   Intake 240 ml   Output    Net 240 ml     Physical Exam:   General: comfortable, no acute distress   HEENT sclera anicteric, supple neck, no thyromegaly  CVS: S1S2 heard,  no rub  RS: + air entry b/l,  + focal wheezing   Abd: Soft, Non tender,   Neuro: non focal, awake, alert , CN II-XII are grossly intact  Extrm: + edema, no cyanosis, clubbing   Skin: no visible  Rash  Musculoskeletal: No gross joints or bone deformities     Procedures/imaging: see electronic medical records for all procedures, Xrays and details which were not copied into this note but were reviewed prior to creation of Chung Banerjee MD  October 3, 2019  [facility]  Office [phone]

## 2019-10-03 NOTE — ROUTINE PROCESS
Bedside and Verbal shift change report given to Scarlett Flood RN (oncoming nurse) by Elvis Jean RN 
 (offgoing nurse). Report included the following information SBAR and Kardex.

## 2019-10-03 NOTE — PROGRESS NOTES
Infectious Disease progress Note    Requested by:dr. Rolly Thompson    Reason: enterococcus bacteremia, possible pneumonia    Current abx Prior abx   Amp/sulbactam since 10/1 Cefepime, azithromycin, levofloxacin 9/28-10/1     Lines:       Assessment :    53 y.o. female with history of asthma, COPD admitted to [address] [place] [address] for difficulty breathing. Now with enterococcus faeacalis bacteremia. Highly complex clinical picture. After obtaining a detailed history and exam; clinical presentation seems most c/w asthma/COPD exacerbation  Low clinical probability of pneumonia. No definitive infiltrate noted on cxr 9/28 or repeat imaging 10/2/19. Also, single positive blood culture for enterococcus is likely contaminant. Negative  repeat blood cultures    Recommendations:    1. discontinue amp/sulbactam   2. F/u pulmonary recommendations. Will sign off. F/u prn. thanks    Advance Care planning: discussed  with patient/surrogate decision maker: adrian kamara: [phone]     Above plan was discussed in details with patient, and dr Rolly Thompson. Please call me if any further questions or concerns. Will continue to participate in the care of this patient. HPI:       She states she feels much better today. She denies chest pain or hemoptysis. Her chest is still tight. She states that she has mostly dry cough. Denies subjective fever/malaise. Patient denies headaches, visual disturbances, sore throat, runny nose, earaches, cp, sob, chills, abdominal pain, diarrhea, burning micturition, pain or weakness in extremities. He denies back pain/flank pain. home Medication List    Details   hydroCHLOROthiazide (HYDRODIURIL) 50 mg tablet Take 25 mg by mouth daily. QUEtiapine (SEROQUEL) 300 mg tablet Take 300 mg by mouth nightly. Indications: States she lost her daughter      albuterol sulfate 90 mcg/actuation aepb Take 2 Puffs by inhalation every four (4) hours as needed for Cough or Other (Wheezing).   Qty: 1 Inhaler, Refills: 0 fluticasone furoate-vilanterol (BREO ELLIPTA) 200-25 mcg/dose inhaler Take 1 Puff by inhalation daily. lisinopril (PRINIVIL, ZESTRIL) 20 mg tablet Take 20 mg by mouth daily. amLODIPine (NORVASC) 10 mg tablet Take 1 Tab by mouth daily. Qty: 30 Tab, Refills: 0      butalbital-acetaminophen-caff (FIORICET) -40 mg per capsule Take 1 Cap by mouth every four (4) hours as needed for Pain. Qty: 10 Cap, Refills: 0      arformoterol (BROVANA) 15 mcg/2 mL nebu neb solution 2 mL by Nebulization route two (2) times a day. Qty: 60 Vial, Refills: 0      budesonide (PULMICORT) 0.5 mg/2 mL nbsp 2 mL by Nebulization route two (2) times a day. Qty: 60 Each, Refills: 0      tiotropium (SPIRIVA) 18 mcg inhalation capsule Take 1 Cap by inhalation daily. Qty: 30 Cap, Refills: 0      aspirin 81 mg chewable tablet Take 1 Tab by mouth daily. Qty: 30 Tab, Refills: 0      atorvastatin (LIPITOR) 20 mg tablet Take 1 Tab by mouth nightly. Qty: 30 Tab, Refills: 0      cloNIDine HCl (CATAPRES) 0.2 mg tablet Take 1 Tab by mouth two (2) times a day. Qty: 60 Tab, Refills: 0      docusate sodium (COLACE) 100 mg capsule Take 1 Cap by mouth two (2) times a day. Qty: 60 Cap, Refills: 0      escitalopram oxalate (LEXAPRO) 10 mg tablet Take 1 Tab by mouth every evening. Qty: 30 Tab, Refills: 0      metoprolol tartrate (LOPRESSOR) 25 mg tablet Take 0.5 Tabs by mouth every twelve (12) hours. Qty: 30 Tab, Refills: 0      roflumilast (DALIRESP) 500 mcg tab tablet Take 1 Tab by mouth daily. Qty: 30 Tab, Refills: 0      !! OTHER Check CBC, CMP, Mg in 4 days, results to PCP immediately, Diagnosis- asthma  Qty: 1 Each, Refills: 0      !! OTHER Incentive spirometry- use as directed  Qty: 1 Each, Refills: 0      naloxone (NARCAN) 4 mg/actuation nasal spray Use 1 spray intranasally, then discard. Repeat with new spray every 2 min as needed for opioid overdose symptoms, alternating nostrils.   Qty: 1 Each, Refills: 0 budesonide-formoterol (SYMBICORT) 160-4.5 mcg/actuation HFAA Take 2 Puffs by inhalation two (2) times a day. Qty: 1 Inhaler, Refills: 1      ALPRAZolam (XANAX) 1 mg tablet Take 1 Tab by mouth two (2) times a day. Max Daily Amount: 2 mg. Qty: 20 Tab, Refills: 0      famotidine (PEPCID) 20 mg tablet Take 1 Tab by mouth daily. Qty: 30 Tab, Refills: 1      montelukast (SINGULAIR) 10 mg tablet Take 1 Tab by mouth nightly. Qty: 30 Tab, Refills: 1       !! - Potential duplicate medications found. Please discuss with provider.           Current Facility-Administered Medications   Medication Dose Route Frequency    0.9% sodium chloride infusion  100 mL/hr IntraVENous CONTINUOUS    predniSONE (DELTASONE) tablet 60 mg  60 mg Oral DAILY WITH BREAKFAST    tiotropium (SPIRIVA) inhalation capsule 18 mcg  1 Cap Inhalation DAILY    arformoterol (BROVANA) neb solution 15 mcg  15 mcg Nebulization BID RT    albuterol (PROVENTIL VENTOLIN) nebulizer solution 2.5 mg  2.5 mg Nebulization Q4H PRN    ampicillin-sulbactam (UNASYN) 3 g in 0.9% sodium chloride (MBP/ADV) 100 mL MBP  3 g IntraVENous Q6H    polyethylene glycol (MIRALAX) packet 17 g  17 g Oral DAILY    budesonide (PULMICORT) 500 mcg/2 ml nebulizer suspension  500 mcg Nebulization BID RT    nicotine (NICODERM CQ) 14 mg/24 hr patch 1 Patch  1 Patch TransDERmal DAILY    alum-mag hydroxide-simeth (MYLANTA) oral suspension 30 mL  30 mL Oral Q4H PRN    amLODIPine (NORVASC) tablet 10 mg  10 mg Oral DAILY    aspirin chewable tablet 81 mg  81 mg Oral DAILY    atorvastatin (LIPITOR) tablet 20 mg  20 mg Oral QHS    cloNIDine HCl (CATAPRES) tablet 0.2 mg  0.2 mg Oral BID    escitalopram oxalate (LEXAPRO) tablet 10 mg  10 mg Oral QPM    famotidine (PEPCID) tablet 20 mg  20 mg Oral DAILY    [Held by provider] metoprolol tartrate (LOPRESSOR) tablet 12.5 mg  12.5 mg Oral Q12H    lisinopril (PRINIVIL, ZESTRIL) tablet 20 mg  20 mg Oral DAILY    roflumilast (DALIRESP) tablet 500 mcg  500 mcg Oral DAILY    naloxone (NARCAN) injection 0.4 mg  0.4 mg IntraVENous PRN    ondansetron (ZOFRAN) injection 4 mg  4 mg IntraVENous Q6H PRN    heparin (porcine) injection 5,000 Units  5,000 Units SubCUTAneous Q8H    acetaminophen (TYLENOL) tablet 650 mg  650 mg Oral Q6H PRN    hydrALAZINE (APRESOLINE) 20 mg/mL injection 10 mg  10 mg IntraVENous Q6H PRN    ALPRAZolam (XANAX) tablet 1 mg  1 mg Oral BID PRN    QUEtiapine (SEROquel) tablet 300 mg  300 mg Oral QHS    hydroCHLOROthiazide (HYDRODIURIL) tablet 25 mg  25 mg Oral DAILY       Allergies: Patient has no known allergies. Temp (24hrs), Av °F (36.7 °C), Min:97.6 °F (36.4 °C), Max:98.2 °F (36.8 °C)    Visit Vitals  /79 (BP 1 Location: Left arm, BP Patient Position: At rest)   Pulse 65   Temp 97.8 °F (36.6 °C)   Resp 18   Ht 4' 11.02\" (1.499 m)   Wt 91.3 kg (201 lb 3.2 oz)   LMP 2009   SpO2 96%   Breastfeeding? No   BMI 40.62 kg/m²       ROS: 12 point ROS obtained in details. Pertinent positives as mentioned in HPI,   otherwise negative    Physical Exam:    General: Well developed, well nourished female laying on the bed AAOx3 in no acute distress. General:   awake alert and oriented   HEENT:  Normocephalic, atraumatic, PERRL, EOMI, no scleral icterus or pallor; no conjunctival hemmohage;  nasal and oral mucous are moist and without evidence of lesions. Neck supple, no bruits. Lymph Nodes:   no cervical, axillary or inguinal adenopathy   Lungs:   non-labored, decreased breath sounds bilaterally. Bilateral wheezing. No rales   Heart:  RRR, s1 and s2; no rubs or gallops, no edema, + pedal pulses   Abdomen:  soft, non-distended, active bowel sounds, no hepatomegaly, no splenomegaly. on-tender   Genitourinary:  deferred   Extremities:   no clubbing, cyanosis; no joint effusions or swelling;  Full ROM of all large joints to the upper and lower extremities; muscle mass appropriate for age   Neurologic:  No gross focal sensory abnormalities; 5/5 muscle strength to upper and lower extremities. Speech appropriate.  Cranial nerves intact                        Skin:  No rash or ulcers noted   Back:  no spinal or paraspinal muscle tenderness or rigidity, no CVA tenderness     Psychiatric:  No suicidal or homicidal ideations, appropriate mood and affect         Labs: Results:   Chemistry Recent Labs     10/02/19  0052 10/01/19  0425   * 116*    138   K 3.9 3.6    104   CO2 30 28   BUN 41* 26*   CREA 2.09* 0.98   CA 8.3* 8.2*   AGAP 8 6   BUCR 20 27*      CBC w/Diff Recent Labs     10/02/19  0052 10/01/19  0425   WBC 10.8 9.2   RBC 4.35 4.42   HGB 12.7 13.1   HCT 39.7 39.7    216   GRANS 90* 91*   LYMPH 6* 6*   EOS 0 0      Microbiology Recent Labs     10/02/19  0218 10/02/19  0216   CULT NO GROWTH AFTER 21 HOURS NO GROWTH AFTER 21 HOURS          RADIOLOGY:    All available imaging studies/reports in [facility] for this admission were reviewed    Dr. Carlos A Del Valle, Infectious Disease Specialist  [phone]  October 3, 2019  2:54 PM

## 2019-10-03 NOTE — PROGRESS NOTES
[address] [place] [address] [facility]  Pulmonary, Critical Care, and Sleep Medicine    Name: Jesica Torrez MRN: [id number]   : 1965 Hospital: [address] [place] [address]   Date: 10/3/2019        Pulmonary Medicine      IMPRESSION:   · Asthma- COPD exacerbation- possible pneumonia vs pneumonitis  · Vocal Cord Dysfunction Syndrome- Previously treated by Speech Therapy- some upper airway wheezing today  · + Nicotine smoker  · R/O - MY- Not currently on PAP device at home  · Hypertension  · DEMARCUS- Creat increased today  · Bacteremia- E. Faecalis- Group D- ID consulted- thought to be contaminant  · H/O poor compliance  · H/O thyroid disorder        RECOMMENDATIONS:   · Keep HOB elevated- aspiration precautions  · Patient encouraged to perform breathing exercises that she learned from prior speech therapy  · Please have Case Manger call [facility] to see what active orders they have  · Prednisone 60 mg x 3 days then 40 mg x 45 days, 20 mg x 5 days, 10 mg x 5 days, 5mg x 5 days then stop  · Continue Seroquel  · Start Brovana and continue Pulmicort nebs  · Start Spiriva  · Plan to D/C Patient on Breo of Symbicort (whatever is more affordable) and Spiriva  · Patient needs inhaler educations please  · Reinforce oral care. · Check UDS  · Albuterol nebs PRN  · Patient on steroids- will try to add on IgE level from ED blood (without steroids) and also Allergy- Zone 2 panel  · Judicious use of sedating agents- would avoid benzodiazepines and opiates if at all possible  · SpO2 goal: 92-95  · Defer antibiotics to ID and primary team  · Smoking cessation encouraged. · Consider nephrology consult  · If patient can obtain Wu Dux at OP then continue- if not stop  · Electrolyte , glycemia control and prophylaxis per primary team  · Will need OP follow up- I gave patient my card. Subjective/History: This patient has been seen and evaluated at the request of Dr. Kaveh Rothman for Asthma-COPD exacerbation. Patient is a 53 y. o. female who reports a history of asthma and COPD. She states that when she lived in [place], she was followed by a Dr. Barbie Valentine for her pulmonary issues. She was admitted to for respiratory distress and possible pneumonia. She was treated with course of antibiotics. Patient with subsequent development of DEMARCUS. Blood cultures + for E. Faecalis GrouP    Patient repots that she has several various inhalers and that when she gets into distress she started using all of them  - Albuterol, Flovent, Advair, Breo, etc.  She is not sure what exactly she has. She also reports that she was previously diagnosed with MY and hypoxia. She states she use to have a home PAP device but that the device became infested with insects/bugs and that it became non-usable. She does not know where that device is. She states that she was recently re-qualified for LTOT but that her current tanks are too heavy to use. She reports that she gets all her DME from [facility]    The patient reports that she is newly  and moved back with her mother and brother in housing near the [facility]. She states all 3 of them has respiratory difficulties. She reports her mother's home is badly infested with roaches and that the patient bought several cans of diaz spray and that she developed respiratory distress after inhaling the fumes from this spray. She denies drug use. UDS in July 2019 + for opiates and barbiturates. She states that she smokes when she is stressed and that she smokes 1 pack every 2 weeks. Denies inhaling any other substances- specifically denies vaping THC and/or CBD products. She states she feels much better today. She denies chest pain or hemoptysis. Her chest is still tight. Her sputum is lighter in color. She is tolerating PO but does report episode of emesis yesterday. She was previously followed in 2016/2017 by Dr. Ike Espitia -Pulmonary at [facility]- Some notes available for review.  Notes refer to history of asthma/COPD, and VCD which responded well to SLP. Patient pacing the halls during other hospitalizations,  missed appointments and reports of concern for possible malingering and pain seeking behaviors- Further details can be reviewed in care everywhere    PFTs from 2015 in Care Everywhere notes mixed restrictive and reversible obstruction and FeV1 of 0.95% (27% predicted)- unclear if there were any technical limitations or how reflective this result is.    10/03/19  · Patient resting comfortably in bed  · Air movement sounds tight- also wheezing over anterior throat c/w known diagnosis of Vocal Cord Dysfunction  · Mother also admitted last night for respiratory illness  · Patient tolerating PO, intermittent cough, no current chest pain. States she feels tired today but breathing improved from admission- not back to baseline  · Discussed culture findings with ID staff  · Afebrile      Patient Vitals for the past 24 hrs:   Temp Pulse Resp BP SpO2   10/03/19 0848     97 %   10/03/19 0728 97.8 °F (36.6 °C) 65 18 135/79 96 %   10/03/19 0400 98 °F (36.7 °C) 65 16 118/66 96 %   10/02/19 2345 98 °F (36.7 °C) 60 17 91/48 97 %   10/02/19 2000 97.6 °F (36.4 °C) 74 17 128/73 90 %   10/02/19 1911     99 %   10/02/19 1633 98.1 °F (36.7 °C) 73 18 116/67 90 %       Past Medical History:   Diagnosis Date    Asthma     CHF (congestive heart failure) (HCC)     Chronic obstructive pulmonary disease (HCC)     Endocrine disease     thyroid issues    Gastrointestinal disorder     \"blockage in my stomach\"    Hypertension       No past surgical history on file. Prior to Admission medications    Medication Sig Start Date End Date Taking? Authorizing Provider   hydroCHLOROthiazide (HYDRODIURIL) 50 mg tablet Take 25 mg by mouth daily. Yes Provider, Historical   QUEtiapine (SEROQUEL) 300 mg tablet Take 300 mg by mouth nightly.  Indications: States she lost her daughter   Yes Provider, Historical   albuterol sulfate 90 mcg/actuation aepb Take 2 Puffs by inhalation every four (4) hours as needed for Cough or Other (Wheezing). 7/25/19  Yes Kanwal Chatman MD   fluticasone furoate-vilanterol (BREO ELLIPTA) 200-25 mcg/dose inhaler Take 1 Puff by inhalation daily. Yes Abliio, MD Shalini   lisinopril (PRINIVIL, ZESTRIL) 20 mg tablet Take 20 mg by mouth daily. Yes Abilio, MD Shalini   amLODIPine (NORVASC) 10 mg tablet Take 1 Tab by mouth daily. 7/5/19  Yes Paty Drew MD   butalbital-acetaminophen-caff (FIORICET) -40 mg per capsule Take 1 Cap by mouth every four (4) hours as needed for Pain. 6/30/19  Yes Rolly Ruiz PA-C   arformoterol (BROVANA) 15 mcg/2 mL nebu neb solution 2 mL by Nebulization route two (2) times a day. 3/14/19  Yes Elizabeth Tellez MD   budesonide (PULMICORT) 0.5 mg/2 mL nbsp 2 mL by Nebulization route two (2) times a day. 3/14/19  Yes Elizabeth Tellez MD   tiotropium (SPIRIVA) 18 mcg inhalation capsule Take 1 Cap by inhalation daily. 3/14/19  Yes Elizabeth Tellez MD   aspirin 81 mg chewable tablet Take 1 Tab by mouth daily. 3/15/19  Yes Elizabeth Tellez MD   atorvastatin (LIPITOR) 20 mg tablet Take 1 Tab by mouth nightly. 3/14/19  Yes Elizabeth Tellez MD   cloNIDine HCl (CATAPRES) 0.2 mg tablet Take 1 Tab by mouth two (2) times a day. 3/14/19  Yes Elizabeth Tellez MD   docusate sodium (COLACE) 100 mg capsule Take 1 Cap by mouth two (2) times a day. 3/14/19  Yes Elizabeth Tellez MD   escitalopram oxalate (LEXAPRO) 10 mg tablet Take 1 Tab by mouth every evening. 3/14/19  Yes Elizabeth Tellez MD   metoprolol tartrate (LOPRESSOR) 25 mg tablet Take 0.5 Tabs by mouth every twelve (12) hours. 3/14/19  Yes Elizabeth Tellez MD   roflumilast (DALIRESP) 500 mcg tab tablet Take 1 Tab by mouth daily.  3/15/19  Yes Elizabeth Tellez MD   OTHER Check CBC, CMP, Mg in 4 days, results to PCP immediately, Diagnosis- asthma 3/14/19  Yes Elizabeth Tellez MD   OTHER Incentive spirometry- use as directed 3/14/19  Yes Darrius, Tatiana Hoang MD   naloxone [facility]) 4 mg/actuation nasal spray Use 1 spray intranasally, then discard. Repeat with new spray every 2 min as needed for opioid overdose symptoms, alternating nostrils. 3/14/19  Yes Elizabeth Tellez MD   budesonide-formoterol (SYMBICORT) 160-4.5 mcg/actuation HFAA Take 2 Puffs by inhalation two (2) times a day. 8/23/18  Yes Loretta Riojas DO   ALPRAZolam [facility]) 1 mg tablet Take 1 Tab by mouth two (2) times a day. Max Daily Amount: 2 mg. 4/24/17  Yes Kim Lafleur MD   famotidine (PEPCID) 20 mg tablet Take 1 Tab by mouth daily. 3/9/17  Yes Fidel Edward MD   montelukast (SINGULAIR) 10 mg tablet Take 1 Tab by mouth nightly.  3/9/17  Yes Fidel Edward MD     Current Facility-Administered Medications   Medication Dose Route Frequency    0.9% sodium chloride infusion  100 mL/hr IntraVENous CONTINUOUS    predniSONE (DELTASONE) tablet 60 mg  60 mg Oral DAILY WITH BREAKFAST    tiotropium (SPIRIVA) inhalation capsule 18 mcg  1 Cap Inhalation DAILY    arformoterol (BROVANA) neb solution 15 mcg  15 mcg Nebulization BID RT    polyethylene glycol (MIRALAX) packet 17 g  17 g Oral DAILY    budesonide (PULMICORT) 500 mcg/2 ml nebulizer suspension  500 mcg Nebulization BID RT    nicotine (NICODERM CQ) 14 mg/24 hr patch 1 Patch  1 Patch TransDERmal DAILY    amLODIPine (NORVASC) tablet 10 mg  10 mg Oral DAILY    aspirin chewable tablet 81 mg  81 mg Oral DAILY    atorvastatin (LIPITOR) tablet 20 mg  20 mg Oral QHS    cloNIDine HCl (CATAPRES) tablet 0.2 mg  0.2 mg Oral BID    escitalopram oxalate (LEXAPRO) tablet 10 mg  10 mg Oral QPM    famotidine (PEPCID) tablet 20 mg  20 mg Oral DAILY    [Held by provider] metoprolol tartrate (LOPRESSOR) tablet 12.5 mg  12.5 mg Oral Q12H    lisinopril (PRINIVIL, ZESTRIL) tablet 20 mg  20 mg Oral DAILY    roflumilast (DALIRESP) tablet 500 mcg  500 mcg Oral DAILY    heparin (porcine) injection 5,000 Units  5,000 Units SubCUTAneous Q8H    QUEtiapine (SEROquel) tablet 300 mg  300 mg Oral QHS    hydroCHLOROthiazide (HYDRODIURIL) tablet 25 mg  25 mg Oral DAILY     No Known Allergies   Social History     Tobacco Use    Smoking status: Current Every Day Smoker     Packs/day: 0.50    Smokeless tobacco: Former User   Substance Use Topics    Alcohol use: No     Comment: socially      No family history on file. Review of Systems:  Pertinent items are noted in HPI. Objective:   Vital Signs:    Visit Vitals  /79 (BP 1 Location: Left arm, BP Patient Position: At rest)   Pulse 65   Temp 97.8 °F (36.6 °C)   Resp 18   Ht 4' 11.02\" (1.499 m)   Wt 91.3 kg (201 lb 3.2 oz)   LMP 2009   SpO2 97%   Breastfeeding? No   BMI 40.62 kg/m²       O2 Device: BIPAP   O2 Flow Rate (L/min): 3 l/min   Temp (24hrs), Av.9 °F (36.6 °C), Min:97.6 °F (36.4 °C), Max:98.1 °F (36.7 °C)       Intake/Output:   Last shift:      No intake/output data recorded. Last 3 shifts: 10/01 1901 - 10/03 0700  In: 240 [P.O.:240]  Out: -     Intake/Output Summary (Last 24 hours) at 10/3/2019 1210  Last data filed at 10/2/2019 1846  Gross per 24 hour   Intake 240 ml   Output    Net 240 ml       Physical Exam:    General:  Alert, cooperative, no distress, appears stated age. Good vocal quality   Head:  Normocephalic, without obvious abnormality, atraumatic. Eyes:  Conjunctivae/corneas clear. PERRL, EOMs intact. Nose: Nares normal. Septum midline. Mucosa normal. No drainage or sinus tenderness. Throat: Lips, mucosa. Some missing teeth-no exudates, large tongue, Mallampati 3   Neck: Supple, symmetrical, trachea midline, no adenopathy, thyroid: no enlargment/tenderness/nodules, no carotid bruit and no JVD. - wheezing over anterior throat. Back:   Symmetric    Lungs:   Diffuse bilateral wheezing, no cough with inspiration, no stridor   Chest wall:  No tenderness or deformity. Heart:  Regular rate and rhythm, S1, S2 normal, no murmur, click, rub or gallop.    Abdomen:   Soft, non-tender. Bowel sounds normal. No masses,  No organomegaly. Extremities: Extremities normal, atraumatic, no cyanosis or edema. Pulses: 2+ and symmetric all extremities. Skin: Skin color, texture, turgor normal. No rashes or lesions   Lymph nodes:      Cervical, supraclavicular nodes- unremarkable   Neurologic: Grossly nonfocal       Data:     Recent Results (from the past 24 hour(s))   DRUG SCREEN, URINE    Collection Time: 10/02/19  5:55 PM   Result Value Ref Range    BENZODIAZEPINES POSITIVE (A) NEG      BARBITURATES NEGATIVE  NEG      THC (TH-CANNABINOL) NEGATIVE  NEG      OPIATES POSITIVE (A) NEG      PCP(PHENCYCLIDINE) NEGATIVE  NEG      COCAINE NEGATIVE  NEG      AMPHETAMINES NEGATIVE  NEG      METHADONE NEGATIVE  NEG      HDSCOM (NOTE)    METABOLIC PANEL, BASIC    Collection Time: 10/03/19  9:41 AM   Result Value Ref Range    Sodium 137 136 - 145 mmol/L    Potassium 3.5 3.5 - 5.5 mmol/L    Chloride 101 100 - 111 mmol/L    CO2 29 21 - 32 mmol/L    Anion gap 7 3.0 - 18 mmol/L    Glucose 150 (H) 74 - 99 mg/dL    BUN 53 (H) 7.0 - 18 MG/DL    Creatinine 2.28 (H) 0.6 - 1.3 MG/DL    BUN/Creatinine ratio 23 (H) 12 - 20      GFR est AA 27 (L) >60 ml/min/1.73m2    GFR est non-AA 22 (L) >60 ml/min/1.73m2    Calcium 8.0 (L) 8.5 - 10.1 MG/DL           No results for input(s): FIO2I, IFO2, HCO3I, IHCO3, HCOPOC, PCO2I, PCOPOC, IPHI, PHI, PHPOC, PO2I, PO2POC in the last 72 hours.     No lab exists for component: IPOC2     Lab Results   Component Value Date/Time    BNP 19.9 12/07/2015 05:23 PM    BNP 3.4 11/01/2015 03:20 PM    NT pro- 09/27/2019 10:50 PM    NT pro-BNP 96 07/26/2019 10:35 AM    NT pro- 04/14/2019 08:41 PM    NT pro- 03/09/2019 05:23 PM    NT pro- 12/19/2017 08:45 AM     Lab Results   Component Value Date/Time    TSH 0.18 (L) 09/27/2019 10:50 PM    Triiodothyronine (T3), free 2.3 11/19/2015 06:15 AM    T4, Free 1.1 09/27/2019 10:50 PM     Lab Results   Component Value Date/Time    Hemoglobin A1c 4.5 09/28/2019 03:49 AM            Imaging:  I have personally reviewed the patients radiographs and have reviewed the reports:  CXR Results  (Last 48 hours)               10/03/19 0549  XR CHEST PORT Final result    Impression:  IMPRESSION:        Question retrocardiac opacity. Stable enlarged cardiac silhouette       Narrative:  EXAM: XR CHEST PORT       INDICATION: evaluate for pneumonia       COMPARISON: Recent prior. FINDINGS: A portable AP radiograph of the chest was obtained at 0513 hours. Telemetry leads have been removed. Question retrocardiac opacity. The cardiac   and mediastinal contours are unchanged. The bones and soft tissues are grossly   stable. CT Results (most recent):  Results from Hospital Encounter encounter on 07/05/19   CT HEAD WO CONT    Narrative EXAM: CT HEAD W/O CONTRAST    INDICATION: Syncope, difficulty speaking    COMPARISON: CT head 6/30/2019    TECHNIQUE: Axial CT imaging of the head was performed without intravenous  contrast.  Additional coronal and sagittal reconstructions were performed. One  or more dose reduction techniques were used on this CT: automated exposure  control, adjustment of the mAs and/or kVp according to patient's size, and  iterative reconstruction techniques. The specific techniques utilized on this CT  exam have been documented in the patient's electronic medical record. Digital  Imaging and Communications in Medicine (DICOM) format image data are available  to nonaffiliated external healthcare facilities or entities on a secure, media  free, reciprocally searchable basis with patient authorization for at least a  12-month period after this study. _______________    FINDINGS:    VENTRICLES/EXTRA-AXIAL SPACES: The ventricles and sulci are normal in their size  and configuration. BRAIN PARENCHYMA: There is no evidence of acute intracranial hemorrhage, mass  effect, midline shift, or herniation. No definite CT evidence of acute cortical  infarct is seen. A minimal amount of hypodense white matter lesions are seen  within the periventricular and subcortical white matter which are nonspecific,  but likely represent chronic small vessel changes. ORBITS: The visualized orbits are unremarkable. PARANASAL SINUSES: Visualized paranasal sinuses are clear. MASTOID AIR CELLS: Visualized mastoid air cells are clear. OSSEOUS STRUCTURES: No fracture is seen. OTHER: None.      _______________      Impression IMPRESSION:    1. No acute intracranial hemorrhage, mass effect, midline shift, or herniation. No definite CT evidence of acute cortical infarct is seen. Please note that  noncontrast head CT may be normal in early acute infarct. 2. Stable, minimal nonspecific white matter disease likely representing chronic  small vessel changes. CRITICAL RESULT:    These critical results on this CODE S patient were directly communicated to Dr. Sharmila Taylor on 7/5/2019 9:43 AM.  Results understood and acknowledged.                 Total Clinical care time exclusive of procedures: 30 minutes      Shashi Hinkle DO, [facility]    [facility]  Pulmonary, Critical Care, and Sleep Medicine

## 2019-10-03 NOTE — PROGRESS NOTES
[facility]  Progress Note    Patient: Conner Khan Age: 53 y.o. : 1965 MR#: [id number] SSN: [id number]  Date: 10/3/2019     Subjective/24-hour events:     Pt seen & evaluated - is still SOB , coughing     Assessment:   COPD with acute exacerbation  Acute hypercapnic respiratory failure requiring BiPAP, resolved  Hypertensive urgency  Concern for pneumonia, doubt  Chronic prescription narcotic use   Obesity, BMI 39.4  Active smoker  Bacteremia - E fecalis   ARF     Plan:  Add pulmicort nebs. Switch to Po steroids, continue bronchodilators as ordered. Some improvement in wheezing noted on exam today. Based on previous experience, patient is notoriously slow to improve. Pulm consulted today   Bronchodilator therapy and usual pulmonary hygiene as ordered. Bacteremia - ID consulted - continue IV Unasyn   BPs stable - continue current regimen. Needs to stop smoking - previously discussed. No plan to increase BZD or narcotic. ARF - stopped ACE - Nephro consulted   Mobilize as able. Follow. Case discussed with:  [x]Patient  []Family  [x]Nursing  [x]Case Management  DVT Prophylaxis:  []Lovenox  [x]Hep SQ  []SCDs  []Coumadin   []On Heparin gtt    Objective:   VS:   Visit Vitals  /64 (BP 1 Location: Right arm, BP Patient Position: At rest)   Pulse 75   Temp 97 °F (36.1 °C)   Resp 18   Ht 4' 11.02\" (1.499 m)   Wt 91.3 kg (201 lb 3.2 oz)   LMP 2009   SpO2 97%   Breastfeeding? No   BMI 40.62 kg/m²      Tmax/24hrs: Temp (24hrs), Av.8 °F (36.6 °C), Min:97 °F (36.1 °C), Max:98.1 °F (36.7 °C)      Intake/Output Summary (Last 24 hours) at 10/3/2019 1610  Last data filed at 10/2/2019 1846  Gross per 24 hour   Intake 240 ml   Output    Net 240 ml       General:  In NAD. Nontoxic-appearing. Cardiovascular:  RRR. Pulmonary:  Still with diffuse wheezing bilaterally but improved relative to yesterday. GI:  Abdomen soft, NTTP.   Extremities:  Warm, no ischemia.     Neuro:  Awake and alert, motor nonfocal.    Labs:    Recent Results (from the past 24 hour(s))   DRUG SCREEN, URINE    Collection Time: 10/02/19  5:55 PM   Result Value Ref Range    BENZODIAZEPINES POSITIVE (A) NEG      BARBITURATES NEGATIVE  NEG      THC (TH-CANNABINOL) NEGATIVE  NEG      OPIATES POSITIVE (A) NEG      PCP(PHENCYCLIDINE) NEGATIVE  NEG      COCAINE NEGATIVE  NEG      AMPHETAMINES NEGATIVE  NEG      METHADONE NEGATIVE  NEG      HDSCOM (NOTE)    METABOLIC PANEL, BASIC    Collection Time: 10/03/19  9:41 AM   Result Value Ref Range    Sodium 137 136 - 145 mmol/L    Potassium 3.5 3.5 - 5.5 mmol/L    Chloride 101 100 - 111 mmol/L    CO2 29 21 - 32 mmol/L    Anion gap 7 3.0 - 18 mmol/L    Glucose 150 (H) 74 - 99 mg/dL    BUN 53 (H) 7.0 - 18 MG/DL    Creatinine 2.28 (H) 0.6 - 1.3 MG/DL    BUN/Creatinine ratio 23 (H) 12 - 20      GFR est AA 27 (L) >60 ml/min/1.73m2    GFR est non-AA 22 (L) >60 ml/min/1.73m2    Calcium 8.0 (L) 8.5 - 10.1 MG/DL       Signed By: Robyn Shah MD     October 3, 2019

## 2019-10-03 NOTE — PROGRESS NOTES
Received report on pt.from off going RN. Resting quietly in bed on rounds. Denies c/o pain or SOB at this time. call bell at side. No acute distress noted. Calm and cooperative at this time. Will cont to monitor for any changes in status. 1200 frequently walks in hallway and sits in chair in samuels. Request that NS be taken loose so she can go to BR or walk in hallway. Encouraged pt to keep NS hooked up as much as possible due to increased BUN and CReatinine. 1600 pt taken per w/c with portable 02 to visit with her mom in 455. CNA stayed with pt and brought her back to her room with portable 02 on.     1700  explained to pt that when she voids next time that a specimen needs to be sent down to check sodium and that a post void residual needs to be done. Cap placed in commode. 1800 sitting out in hallway with portable 02 on. Reminded pt of need to measure next void and that a bladder check needs to be done. Bedside and Verbal shift change report given to Luly WAGGONER (oncoming nurse) by Nikita Hall RN (offgoing nurse). Report given with SEPIDEH, Rita and MAR.

## 2019-10-03 NOTE — PROGRESS NOTES
NUTRITION    Nursing Referral: [facility]     RECOMMENDATIONS / PLAN:     - Continue current diet order, monitor intake. - Continue RD inpatient monitoring and evaluation. NUTRITION INTERVENTIONS & DIAGNOSIS:     - Meals/snacks: general/healthy diet    Nutrition Diagnosis: Unintended weight loss related to predicted inadequate energy intake as evidenced by pt with a 15 lb, 7.1% weight loss x 6 months PTA. ASSESSMENT:     10/3: Pt with good meal intake/appetite today. Pt using alternative menu to order mainly hot dogs, cortez, toasts. Encouraged healthy eating and limiting hot dogs, pt agreeable to grilled cheese and tomato soup today for lunch. Miralax ordered for promotion of BM.  9/30: Pt drowsy during visit, limited responses to questions. Reports decreased appetite x 3 weeks PTA with weight loss, unable to provide further details. Good intake today per chart, tolerating diet. COPD, receiving steroid therapy.     Nutritional intake adequate to meet patients estimated nutritional needs:  Yes    Diet: DIET REGULAR      Food Allergies: NKFA  Current Appetite: Good  Appetite/meal intake prior to admission: Fair x 3 weeks  Feeding Limitations:  [] Swallowing difficulty    [] Chewing difficulty    [] Other:  Current Meal Intake:   Patient Vitals for the past 100 hrs:   % Diet Eaten   10/02/19 1846 0 %   09/30/19 1940 100 %   09/30/19 1032 75 %       BM: 9/27  Skin Integrity: WDL   Edema:   [x] No     [] Yes   Pertinent Medications: Reviewed: NS at 100 mL/hr, mylanta, pepcid, steroid, zofran, miralax    Recent Labs     10/03/19  0941 10/02/19  0052 10/01/19  0425    139 138   K 3.5 3.9 3.6    101 104   CO2 29 30 28   * 131* 116*   BUN 53* 41* 26*   CREA 2.28* 2.09* 0.98   CA 8.0* 8.3* 8.2*       Intake/Output Summary (Last 24 hours) at 10/3/2019 1231  Last data filed at 10/2/2019 1846  Gross per 24 hour   Intake 240 ml   Output    Net 240 ml       Anthropometrics:  Ht Readings from Last 1 Encounters: 09/28/19 4' 11.02\" (1.499 m)     Last 3 Recorded Weights in this Encounter    09/30/19 0419 10/02/19 0403 10/03/19 0432   Weight: 89.4 kg (197 lb) 90.6 kg (199 lb 11.2 oz) 91.3 kg (201 lb 3.2 oz)     Body mass index is 40.62 kg/m². Obese Class III    Weight History: Reports a 10 lb weight loss x 3 weeks PTA. Per chart -15 lbs, 7.1 % x 6 months. Weight Metrics 10/3/2019 7/25/2019 7/5/2019 6/30/2019 4/14/2019 4/14/2019 3/14/2019   Weight 201 lb 3.2 oz 202 lb 204 lb 184 lb 203 lb 203 lb 212 lb 12.8 oz   BMI 40.62 kg/m2 40.8 kg/m2 41.2 kg/m2 37.16 kg/m2 41 kg/m2 41 kg/m2 42.98 kg/m2        Admitting Diagnosis: CAP (community acquired pneumonia) [J18.9]  COPD (chronic obstructive pulmonary disease) [facility] [J44.9]  Pertinent PMHx: COPD, HTN, CHF    Education Needs:        [x] None identified  [] Identified - Not appropriate at this time  []  Identified and addressed - refer to education log  Learning Limitations:   [x] None identified  [] Identified    Cultural, Congregational & ethnic food preferences:  [x] None identified    [] Identified and addressed     ESTIMATED NUTRITION NEEDS:     Calories: 7288-2232 kcal (MSJx1.2-1.3) based on  [x] Actual BW: 89 kg      [] IBW   Protein: 71-89 gm (0.8-1 gm/kg) based on  [x] Actual BW      [] IBW   Fluid: 1 mL/kcal     MONITORING & EVALUATION:     Nutrition Goal(s):   - PO nutrition intake will meet >75% of patient estimated nutritional needs within the next 7 days. Outcome: Met/Ongoing     Monitoring:   [x] Food and beverage intake   [x] Diet order   [x] Nutrition-focused physical findings   [x] Treatment/therapy   [] Weight   [] Enteral nutrition intake        Previous Recommendations (for follow-up assessments only): Implemented      Discharge Planning: No nutritional discharge needs at this time.    [x] Participated in care planning, discharge planning, & interdisciplinary rounds as appropriate      Gabrielle Liang RD   Pager: [phone]

## 2019-10-04 VITALS
DIASTOLIC BLOOD PRESSURE: 60 MMHG | OXYGEN SATURATION: 90 % | BODY MASS INDEX: 41.37 KG/M2 | HEIGHT: 59 IN | TEMPERATURE: 98.1 F | RESPIRATION RATE: 18 BRPM | WEIGHT: 205.2 LBS | HEART RATE: 62 BPM | SYSTOLIC BLOOD PRESSURE: 117 MMHG

## 2019-10-04 LAB
A ALTERNATA IGE QN: <0.1 KU/L
A FUMIGATUS IGE QN: <0.1 KU/L
AMER ROACH IGE QN: 1.03 KU/L
AMER SYCAMORE IGE QN: <0.1 KU/L
BACTERIA SPEC CULT: NORMAL
BAHIA GRASS IGE QN: <0.1 KU/L
BERMUDA GRASS IGE QN: <0.1 KU/L
BOXELDER IGE QN: <0.1 KU/L
C HERBARUM IGE QN: <0.1 KU/L
CAT DANDER IGG QN: <0.1 KU/L
CLASS DESCRIPTION, 600268: ABNORMAL
COMMON RAGWEED IGE QN: 0.85 KU/L
D FARINAE IGE QN: 22.5 KU/L
D PTERONYSS IGE QN: 24 KU/L
DEPRECATED IGE QN: <0.1 KU/L
DOG DANDER IGE QN: 0.14 KU/L
ENGL PLANTAIN IGE QN: <0.1 KU/L
GLUCOSE BLD STRIP.AUTO-MCNC: 150 MG/DL (ref 70–110)
IGE SERPL-ACNC: 293 IU/ML (ref 6–495)
JOHNSON GRASS IGE QN: <0.1 KU/L
M RACEMOSUS IGE QN: <0.1 KU/L
MT JUNIPER IGE QN: 0.15 KU/L
MUGWORT IGE QN: <0.1 KU/L
NETTLE IGE QN: <0.1 KU/L
P NOTATUM IGE QN: <0.1 KU/L
S BOTRYOSUM IGE QN: <0.1 KU/L
SERVICE CMNT-IMP: NORMAL
SHEEP SORREL IGE QN: <0.1 KU/L
SODIUM UR-SCNC: 46 MMOL/L (ref 20–110)
SWEET GUM IGE QN: <0.1 KU/L
TIMOTHY IGE QN: 0.58 KU/L
WHITE BIRCH IGE QN: <0.1 KU/L
WHITE ELM IGG QN: <0.1 KU/L
WHITE HICKORY IGE QN: <0.1 KU/L
WHITE MULBERRY IGE QN: <0.1 KU/L
WHITE OAK IGE QN: <0.1 KU/L

## 2019-10-04 PROCEDURE — 74011000250 HC RX REV CODE- 250: Performed by: FAMILY MEDICINE

## 2019-10-04 PROCEDURE — 74011000250 HC RX REV CODE- 250: Performed by: INTERNAL MEDICINE

## 2019-10-04 PROCEDURE — 74011250636 HC RX REV CODE- 250/636: Performed by: INTERNAL MEDICINE

## 2019-10-04 PROCEDURE — 74011250637 HC RX REV CODE- 250/637: Performed by: INTERNAL MEDICINE

## 2019-10-04 PROCEDURE — 94660 CPAP INITIATION&MGMT: CPT

## 2019-10-04 PROCEDURE — 74011636637 HC RX REV CODE- 636/637: Performed by: INTERNAL MEDICINE

## 2019-10-04 PROCEDURE — 94640 AIRWAY INHALATION TREATMENT: CPT

## 2019-10-04 PROCEDURE — 77030040830 HC CATH URETH FOL MDII -A

## 2019-10-04 PROCEDURE — 77010033678 HC OXYGEN DAILY

## 2019-10-04 PROCEDURE — 74011250637 HC RX REV CODE- 250/637: Performed by: FAMILY MEDICINE

## 2019-10-04 PROCEDURE — 82962 GLUCOSE BLOOD TEST: CPT

## 2019-10-04 PROCEDURE — 74011250637 HC RX REV CODE- 250/637: Performed by: HOSPITALIST

## 2019-10-04 PROCEDURE — 77030011943

## 2019-10-04 PROCEDURE — 51798 US URINE CAPACITY MEASURE: CPT

## 2019-10-04 RX ADMIN — TIOTROPIUM BROMIDE 18 MCG: 18 CAPSULE ORAL; RESPIRATORY (INHALATION) at 08:54

## 2019-10-04 RX ADMIN — ASPIRIN 81 MG 81 MG: 81 TABLET ORAL at 10:08

## 2019-10-04 RX ADMIN — ARFORMOTEROL TARTRATE 15 MCG: 15 SOLUTION RESPIRATORY (INHALATION) at 08:54

## 2019-10-04 RX ADMIN — AMLODIPINE BESYLATE 10 MG: 10 TABLET ORAL at 10:08

## 2019-10-04 RX ADMIN — POLYETHYLENE GLYCOL 3350 17 G: 17 POWDER, FOR SOLUTION ORAL at 10:08

## 2019-10-04 RX ADMIN — ROFLUMILAST 500 MCG: 500 TABLET ORAL at 10:07

## 2019-10-04 RX ADMIN — HEPARIN SODIUM 5000 UNITS: 5000 INJECTION INTRAVENOUS; SUBCUTANEOUS at 10:07

## 2019-10-04 RX ADMIN — HEPARIN SODIUM 5000 UNITS: 5000 INJECTION INTRAVENOUS; SUBCUTANEOUS at 03:02

## 2019-10-04 RX ADMIN — BUDESONIDE 500 MCG: 0.5 INHALANT RESPIRATORY (INHALATION) at 08:54

## 2019-10-04 RX ADMIN — CLONIDINE HYDROCHLORIDE 0.2 MG: 0.1 TABLET ORAL at 10:07

## 2019-10-04 RX ADMIN — OXYCODONE HYDROCHLORIDE AND ACETAMINOPHEN 1 TABLET: 5; 325 TABLET ORAL at 06:05

## 2019-10-04 RX ADMIN — PREDNISONE 60 MG: 20 TABLET ORAL at 10:08

## 2019-10-04 RX ADMIN — ALPRAZOLAM 1 MG: 0.5 TABLET ORAL at 10:08

## 2019-10-04 RX ADMIN — FAMOTIDINE 20 MG: 20 TABLET ORAL at 10:07

## 2019-10-04 NOTE — PROGRESS NOTES
Patient leaving AMA and reports due to discontinuation of percocet and xanax. These were discontinued due to patient's drowsiness and difficulty in rousing. Patient signing [place] paperwork.

## 2019-10-04 NOTE — PROGRESS NOTES
0730 Patient voided 200 mL. Post void residual via bladder scan: 650 mL    Dr. Lucia Mcdowell notified and ordered gtz for urinary retention. Dr. Zohreh Witt aware as well.

## 2019-10-04 NOTE — PROGRESS NOTES
725 ml urine obtained from straight cath post void. Dr. Jacob Houston aware. Walker ordered and will place.

## 2019-10-04 NOTE — PROGRESS NOTES
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 Faecalis- Group D- ID consulted- thought to be contaminant  · H/O poor compliance  · H/O thyroid disorder  · Constipation- patient on Mirlax        RECOMMENDATIONS:   · Keep HOB elevated- aspiration precautions  · Patient encouraged to perform breathing exercises that she learned from prior speech therapy  · Please have Case Manger call [facility] to see what active orders they have- Assess oxygen requirements at rest and with walking prior to d/c  · Prednisone 60 mg x 3 days then 40 mg x 45 days, 20 mg x 5 days, 10 mg x 5 days, 5mg x 5 days then stop  · IgE and Allergy panel added to admission labs- pending at this time  · Continue Seroquel  · Start Brovana and continue Pulmicort nebs- D/C this upon discharge  · Continue Spiriva  · Plan to D/C Patient on Breo of Symbicort (whatever is more affordable) and Spiriva  · Reinforcenhaler educations please  · Reinforce oral care. · Albuterol nebs PRN  · Judicious use of sedating agents- would avoid benzodiazepines and opiates if at all possible  · SpO2 goal: 92-95  · Defer antibiotics to ID and primary team  · Smoking cessation encouraged. · Other trigger avoidance also discussed  · Consider nephrology consult  · Constipation per primary team  · If patient can obtain Daliresp at OP then continue- if not stop  · Electrolyte , glycemia control and prophylaxis per primary team  · Will need OP follow up- I gave patient my card. Subjective/History: This patient has been seen and evaluated at the request of Dr. Cesar Werner for Asthma-COPD exacerbation. Patient is a 53 y.o. female who reports a history of asthma and COPD. She states that when she lived in [place], she was followed by a Dr. Jorje Joy for her pulmonary issues. She was admitted to for respiratory distress and possible pneumonia. She was treated with course of antibiotics. Patient with subsequent development of DEMARCUS. Blood cultures + for E. Faecalis GrouP    Patient repots that she has several various inhalers and that when she gets into distress she started using all of them  - Albuterol, Flovent, Advair, Breo, etc.  She is not sure what exactly she has. She also reports that she was previously diagnosed with MY and hypoxia. She states she use to have a home PAP device but that the device became infested with insects/bugs and that it became non-usable. She does not know where that device is. She states that she was recently re-qualified for LTOT but that her current tanks are too heavy to use. She reports that she gets all her DME from [facility]    The patient reports that she is newly  and moved back with her mother and brother in housing near the [facility]. She states all 3 of them has respiratory difficulties. She reports her mother's home is badly infested with roaches and that the patient bought several cans of diaz spray and that she developed respiratory distress after inhaling the fumes from this spray. She denies drug use. UDS in July 2019 + for opiates and barbiturates. She states that she smokes when she is stressed and that she smokes 1 pack every 2 weeks. Denies inhaling any other substances- specifically denies vaping THC and/or CBD products. She states she feels much better today. She denies chest pain or hemoptysis. Her chest is still tight. Her sputum is lighter in color.   She is tolerating PO but does report episode of emesis yesterday. She was previously followed in 2016/2017 by Dr. Vivian Gomez -Pulmonary at [facility]- Some notes available for review. Notes refer to history of asthma/COPD, and VCD which responded well to SLP. Patient pacing the halls during other hospitalizations,  missed appointments and reports of concern for possible malingering and pain seeking behaviors- Further details can be reviewed in care everywhere    PFTs from 2015 in [facility] notes mixed restrictive and reversible obstruction and FeV1 of 0.95% (27% predicted)- unclear if there were any technical limitations or how reflective this result is.    10/04/19  · Patient resting in bed  · Patient was ambulating the floor yesterday with oxygen tank and without assistance  · Improved air movement, decreased pulmonary wheezing-no wheezing over vocal cords  · Afebrile  · Received some pain meds and benzo- a little more sleepy. I discussed this hospitalist. Primary team to evaluate if patient is on active pain contract. · Tolerating PO  · Patient states she has not had a BM since admission      Patient Vitals for the past 24 hrs:   Temp Pulse Resp BP SpO2   10/04/19 1100 98.3 °F (36.8 °C) 60 18 114/76 100 %   10/04/19 0741 97.1 °F (36.2 °C) 78 18 140/64 90 %   10/04/19 0339 98 °F (36.7 °C) 70 18 138/75 95 %   10/03/19 2330 97.9 °F (36.6 °C) (!) 57 18 107/67 94 %   10/03/19 1922 97.8 °F (36.6 °C) 85 18 163/73 95 %   10/03/19 1605 97 °F (36.1 °C) 75 18 106/64 97 %       Past Medical History:   Diagnosis Date    Asthma     CHF (congestive heart failure) (HCC)     Chronic obstructive pulmonary disease (HCC)     Endocrine disease     thyroid issues    Gastrointestinal disorder     \"blockage in my stomach\"    Hypertension       No past surgical history on file. Prior to Admission medications    Medication Sig Start Date End Date Taking? Authorizing Provider   hydroCHLOROthiazide (HYDRODIURIL) 50 mg tablet Take 25 mg by mouth daily.    Yes Provider, Historical   QUEtiapine (SEROQUEL) 300 mg tablet Take 300 mg by mouth nightly. Indications: States she lost her daughter   Yes Provider, Historical   albuterol sulfate 90 mcg/actuation aepb Take 2 Puffs by inhalation every four (4) hours as needed for Cough or Other (Wheezing). 7/25/19  Yes Aminah Chilel MD   fluticasone furoate-vilanterol (BREO ELLIPTA) 200-25 mcg/dose inhaler Take 1 Puff by inhalation daily. Yes Other, MD Shalini   lisinopril (PRINIVIL, ZESTRIL) 20 mg tablet Take 20 mg by mouth daily. Yes Other, MD Shalini   amLODIPine (NORVASC) 10 mg tablet Take 1 Tab by mouth daily. 7/5/19  Yes Jayson Rocha MD   butalbital-acetaminophen-caff (FIORICET) -40 mg per capsule Take 1 Cap by mouth every four (4) hours as needed for Pain. 6/30/19  Yes Rolly Ruiz PA-C   arformoterol (BROVANA) 15 mcg/2 mL nebu neb solution 2 mL by Nebulization route two (2) times a day. 3/14/19  Yes Nelsy Finch MD   budesonide (PULMICORT) 0.5 mg/2 mL nbsp 2 mL by Nebulization route two (2) times a day. 3/14/19  Yes Nelsy Finch MD   tiotropium (SPIRIVA) 18 mcg inhalation capsule Take 1 Cap by inhalation daily. 3/14/19  Yes Nelsy Finch MD   aspirin 81 mg chewable tablet Take 1 Tab by mouth daily. 3/15/19  Yes Nelsy Finch MD   atorvastatin (LIPITOR) 20 mg tablet Take 1 Tab by mouth nightly. 3/14/19  Yes Nelsy Finch MD   cloNIDine HCl (CATAPRES) 0.2 mg tablet Take 1 Tab by mouth two (2) times a day. 3/14/19  Yes Nelsy Finch MD   docusate sodium (COLACE) 100 mg capsule Take 1 Cap by mouth two (2) times a day. 3/14/19  Yes Nelsy Finch MD   escitalopram oxalate (LEXAPRO) 10 mg tablet Take 1 Tab by mouth every evening. 3/14/19  Yes Nelsy Finch MD   metoprolol tartrate (LOPRESSOR) 25 mg tablet Take 0.5 Tabs by mouth every twelve (12) hours. 3/14/19  Yes Nelsy Finch MD   roflumilast (DALIRESP) 500 mcg tab tablet Take 1 Tab by mouth daily.  3/15/19  Yes Nelsy Finch MD   OTHER Check CBC, CMP, Mg in 4 days, results to PCP immediately, Diagnosis- asthma 3/14/19  Yes Zenobia Ferro MD   OTHER Incentive spirometry- use as directed 3/14/19  Yes Zenobia Ferro MD   naloxone [facility]) 4 mg/actuation nasal spray Use 1 spray intranasally, then discard. Repeat with new spray every 2 min as needed for opioid overdose symptoms, alternating nostrils. 3/14/19  Yes Zenobia Ferro MD   budesonide-formoterol (SYMBICORT) 160-4.5 mcg/actuation HFAA Take 2 Puffs by inhalation two (2) times a day. 8/23/18  Yes XavierLoretta Martin,    ALPRAZolam Marshall Chavo) 1 mg tablet Take 1 Tab by mouth two (2) times a day. Max Daily Amount: 2 mg. 4/24/17  Yes Adina Chavez MD   famotidine (PEPCID) 20 mg tablet Take 1 Tab by mouth daily. 3/9/17  Yes Roberto Ramesh MD   montelukast (SINGULAIR) 10 mg tablet Take 1 Tab by mouth nightly.  3/9/17  Yes Roberto Ramesh MD     Current Facility-Administered Medications   Medication Dose Route Frequency    predniSONE (DELTASONE) tablet 60 mg  60 mg Oral DAILY WITH BREAKFAST    tiotropium (SPIRIVA) inhalation capsule 18 mcg  1 Cap Inhalation DAILY    arformoterol (BROVANA) neb solution 15 mcg  15 mcg Nebulization BID RT    polyethylene glycol (MIRALAX) packet 17 g  17 g Oral DAILY    budesonide (PULMICORT) 500 mcg/2 ml nebulizer suspension  500 mcg Nebulization BID RT    nicotine (NICODERM CQ) 14 mg/24 hr patch 1 Patch  1 Patch TransDERmal DAILY    amLODIPine (NORVASC) tablet 10 mg  10 mg Oral DAILY    aspirin chewable tablet 81 mg  81 mg Oral DAILY    atorvastatin (LIPITOR) tablet 20 mg  20 mg Oral QHS    cloNIDine HCl (CATAPRES) tablet 0.2 mg  0.2 mg Oral BID    escitalopram oxalate (LEXAPRO) tablet 10 mg  10 mg Oral QPM    famotidine (PEPCID) tablet 20 mg  20 mg Oral DAILY    [Held by provider] metoprolol tartrate (LOPRESSOR) tablet 12.5 mg  12.5 mg Oral Q12H    roflumilast (DALIRESP) tablet 500 mcg  500 mcg Oral DAILY    heparin (porcine) injection 5,000 Units  5,000 Units SubCUTAneous Q8H    QUEtiapine (SEROquel) tablet 300 mg  300 mg Oral QHS     No Known Allergies   Social History     Tobacco Use    Smoking status: Current Every Day Smoker     Packs/day: 0.50    Smokeless tobacco: Former User   Substance Use Topics    Alcohol use: No     Comment: socially      No family history on file. Review of Systems:  Pertinent items are noted in HPI. Objective:   Vital Signs:    Visit Vitals  /76 (BP 1 Location: Right arm, BP Patient Position: At rest)   Pulse 60   Temp 98.3 °F (36.8 °C)   Resp 18   Ht 4' 11.02\" (1.499 m)   Wt 93.1 kg (205 lb 3.2 oz)   LMP 2009   SpO2 100%   Breastfeeding? No   BMI 41.42 kg/m²       O2 Device: Nasal cannula   O2 Flow Rate (L/min): 3 l/min   Temp (24hrs), Av.7 °F (36.5 °C), Min:97 °F (36.1 °C), Max:98.3 °F (36.8 °C)       Intake/Output:   Last shift:      10/04 0701 - 10/04 1900  In: -   Out:  [IRUNF:0782]  Last 3 shifts: No intake/output data recorded. Intake/Output Summary (Last 24 hours) at 10/4/2019 1357  Last data filed at 10/4/2019 1243  Gross per 24 hour   Intake    Output 1925 ml   Net -1925 ml       Physical Exam:    General:  Alert, cooperative, no distress, appears stated age. Good vocal quality   Head:  Normocephalic, without obvious abnormality, atraumatic. Eyes:  Conjunctivae/corneas clear. PERRL, EOMs intact. Nose: Nares normal. Septum midline. Mucosa normal. No drainage or sinus tenderness. Throat: Lips, mucosa. Some missing teeth-no exudates, large tongue, Mallampati 3   Neck: Supple, symmetrical, trachea midline, no adenopathy, thyroid: no enlargment/tenderness/nodules, no carotid bruit and no JVD. - wheezing over anterior throat. Back:   Symmetric    Lungs:    +- end expiratory  Wheezing on the left- mild rhonchi on the right, no cough with inspiration, no stridor   Chest wall:  No tenderness or deformity. Heart:  Regular rate and rhythm, S1, S2 normal, no murmur, click, rub or gallop.    Abdomen: Soft, non-tender. Bowel sounds normal. No masses,  No organomegaly. Extremities: Extremities normal, atraumatic, no cyanosis or edema. Pulses: 2+ and symmetric all extremities. Skin: Skin color, texture, turgor normal. No rashes or lesions   Lymph nodes:      Cervical, supraclavicular nodes- unremarkable   Neurologic: Grossly nonfocal       Data:     Recent Results (from the past 24 hour(s))   GLUCOSE, POC    Collection Time: 10/03/19  8:32 PM   Result Value Ref Range    Glucose (POC) 114 (H) 70 - 110 mg/dL           No results for input(s): FIO2I, IFO2, HCO3I, IHCO3, HCOPOC, PCO2I, PCOPOC, IPHI, PHI, PHPOC, PO2I, PO2POC in the last 72 hours. No lab exists for component: IPOC2     Lab Results   Component Value Date/Time    BNP 19.9 12/07/2015 05:23 PM    BNP 3.4 11/01/2015 03:20 PM    NT pro- 09/27/2019 10:50 PM    NT pro-BNP 96 07/26/2019 10:35 AM    NT pro- 04/14/2019 08:41 PM    NT pro- 03/09/2019 05:23 PM    NT pro- 12/19/2017 08:45 AM     Lab Results   Component Value Date/Time    TSH 0.18 (L) 09/27/2019 10:50 PM    Triiodothyronine (T3), free 2.3 11/19/2015 06:15 AM    T4, Free 1.1 09/27/2019 10:50 PM     Lab Results   Component Value Date/Time    Hemoglobin A1c 4.5 09/28/2019 03:49 AM            Imaging:  I have personally reviewed the patients radiographs and have reviewed the reports:  CXR Results  (Last 48 hours)               10/03/19 0549  XR CHEST PORT Final result    Impression:  IMPRESSION:        Question retrocardiac opacity. Stable enlarged cardiac silhouette       Narrative:  EXAM: XR CHEST PORT       INDICATION: evaluate for pneumonia       COMPARISON: Recent prior. FINDINGS: A portable AP radiograph of the chest was obtained at 0513 hours. Telemetry leads have been removed. Question retrocardiac opacity. The cardiac   and mediastinal contours are unchanged. The bones and soft tissues are grossly   stable.                     CT Results (most recent):  Results from Hospital Encounter encounter on 07/05/19   CT HEAD WO CONT    Narrative EXAM: CT HEAD W/O CONTRAST    INDICATION: Syncope, difficulty speaking    COMPARISON: CT head 6/30/2019    TECHNIQUE: Axial CT imaging of the head was performed without intravenous  contrast.  Additional coronal and sagittal reconstructions were performed. One  or more dose reduction techniques were used on this CT: automated exposure  control, adjustment of the mAs and/or kVp according to patient's size, and  iterative reconstruction techniques. The specific techniques utilized on this CT  exam have been documented in the patient's electronic medical record. Digital  Imaging and Communications in Medicine (DICOM) format image data are available  to nonaffiliated external healthcare facilities or entities on a secure, media  free, reciprocally searchable basis with patient authorization for at least a  12-month period after this study. _______________    FINDINGS:    VENTRICLES/EXTRA-AXIAL SPACES: The ventricles and sulci are normal in their size  and configuration. BRAIN PARENCHYMA: There is no evidence of acute intracranial hemorrhage, mass  effect, midline shift, or herniation. No definite CT evidence of acute cortical  infarct is seen. A minimal amount of hypodense white matter lesions are seen  within the periventricular and subcortical white matter which are nonspecific,  but likely represent chronic small vessel changes. ORBITS: The visualized orbits are unremarkable. PARANASAL SINUSES: Visualized paranasal sinuses are clear. MASTOID AIR CELLS: Visualized mastoid air cells are clear. OSSEOUS STRUCTURES: No fracture is seen. OTHER: None.      _______________      Impression IMPRESSION:    1. No acute intracranial hemorrhage, mass effect, midline shift, or herniation. No definite CT evidence of acute cortical infarct is seen.   Please note that  noncontrast head CT may be normal in early acute infarct. 2. Stable, minimal nonspecific white matter disease likely representing chronic  small vessel changes. CRITICAL RESULT:    These critical results on this CODE S patient were directly communicated to Dr. Russ Salinas on 7/5/2019 9:43 AM.  Results understood and acknowledged.                 Total Clinical care time exclusive of procedures: 25minutes      Zach Pineda DO, [facility]    [facility]  Pulmonary, Critical Care, and Sleep Medicine

## 2019-10-04 NOTE — PROGRESS NOTES
Problem: Falls - Risk of  Goal: *Absence of Falls  Description  Document Donata Carrel Fall Risk and appropriate interventions in the flowsheet.   Note:   Fall Risk Interventions:  Mobility Interventions: Bed/chair exit alarm         Medication Interventions: Evaluate medications/consider consulting pharmacy    Elimination Interventions: Call light in reach, Patient to call for help with toileting needs

## 2019-10-04 NOTE — PROGRESS NOTES
Pt seen & evaluated with nursing staff at bedside   Pt is somnolent - advised her that I would stop her pain meds & Xanax - pt is upset that why her pain meds are being stopped - she mentions she is taking percocet q4hrly prn at home - advised her to give me name of pain provider - she is not able to provide me with any info   She mentions she hasnt been able to sleep last night so she is sleepy   Explained that I would like to hold her meds for now - she said she would like to leave if she is not going to get her pain meds   Advised her that she might get sicker since her COPD exac hasnt resolved - she said she is leaving AMA   She is AAOx3 - nursing staff & Phlebotomist  at bedside & witness to our conversation

## 2019-10-04 NOTE — PROGRESS NOTES
RENAL DAILY PROGRESS NOTE    Patient: Donovan Brar               Sex: female          DOA: 9/27/2019 10:39 PM        YOB: 1965      Age:  53 y.o.        LOS:  LOS: 6 days     Subjective:     Donovan Brar is a 53 y.o.  who presents with CAP (community acquired pneumonia) [J18.9]  COPD (chronic obstructive pulmonary disease) ([facility] [J44.9].    Asked to evaluate for arf,admitted with sob  Chief complains: Patient denies nausea, vomiting, chest pain, dizziness, shortness of breath or headache.  - Reviewed last 24 hrs events     Current Facility-Administered Medications   Medication Dose Route Frequency    predniSONE (DELTASONE) tablet 60 mg  60 mg Oral DAILY WITH BREAKFAST    tiotropium (SPIRIVA) inhalation capsule 18 mcg  1 Cap Inhalation DAILY    arformoterol (BROVANA) neb solution 15 mcg  15 mcg Nebulization BID RT    albuterol (PROVENTIL VENTOLIN) nebulizer solution 2.5 mg  2.5 mg Nebulization Q4H PRN    polyethylene glycol (MIRALAX) packet 17 g  17 g Oral DAILY    budesonide (PULMICORT) 500 mcg/2 ml nebulizer suspension  500 mcg Nebulization BID RT    nicotine (NICODERM CQ) 14 mg/24 hr patch 1 Patch  1 Patch TransDERmal DAILY    alum-mag hydroxide-simeth (MYLANTA) oral suspension 30 mL  30 mL Oral Q4H PRN    amLODIPine (NORVASC) tablet 10 mg  10 mg Oral DAILY    aspirin chewable tablet 81 mg  81 mg Oral DAILY    atorvastatin (LIPITOR) tablet 20 mg  20 mg Oral QHS    cloNIDine HCl (CATAPRES) tablet 0.2 mg  0.2 mg Oral BID    escitalopram oxalate (LEXAPRO) tablet 10 mg  10 mg Oral QPM    famotidine (PEPCID) tablet 20 mg  20 mg Oral DAILY    [Held by provider] metoprolol tartrate (LOPRESSOR) tablet 12.5 mg  12.5 mg Oral Q12H    roflumilast (DALIRESP) tablet 500 mcg  500 mcg Oral DAILY    naloxone (NARCAN) injection 0.4 mg  0.4 mg IntraVENous PRN    ondansetron (ZOFRAN) injection 4 mg  4 mg IntraVENous Q6H PRN    heparin (porcine) injection 5,000 Units  5,000 Units SubCUTAneous Q8H    acetaminophen (TYLENOL) tablet 650 mg  650 mg Oral Q6H PRN    hydrALAZINE (APRESOLINE) 20 mg/mL injection 10 mg  10 mg IntraVENous Q6H PRN    QUEtiapine (SEROquel) tablet 300 mg  300 mg Oral QHS       Objective:     Visit Vitals  /76 (BP 1 Location: Right arm, BP Patient Position: At rest)   Pulse 60   Temp 98.3 °F (36.8 °C)   Resp 18   Ht 4' 11.02\" (1.499 m)   Wt 93.1 kg (205 lb 3.2 oz)   SpO2 100%   Breastfeeding? No   BMI 41.42 kg/m²       Intake/Output Summary (Last 24 hours) at 10/4/2019 1237  Last data filed at 10/4/2019 1158  Gross per 24 hour   Intake    Output 1200 ml   Net -1200 ml       Physical Examination:     GEN: lethargic  RS: Chest is bilateral equal, no wheezing / rales / crackles  CVS: S1-S2 heard, RRR, No S3 / murmur  Abdomen: Soft, Non tender, Not distended, Positive bowel sounds, no organomegaly, no CVA / supra pubic tenderness  Extremities: No edema, no cyanosis, skin is warm on touch  HEENT: Head is atraumatic, PERRLA, conjunctiva pink & non icteric. No JVD or carotid bruit       Data Review:      Labs:     Hematology:   Recent Labs     10/02/19  0052   WBC 10.8   HGB 12.7   HCT 39.7     Chemistry:   Recent Labs     10/03/19  0941 10/02/19  0052   BUN 53* 41*   CREA 2.28* 2.09*   CA 8.0* 8.3*   K 3.5 3.9    139    101   CO2 29 30   * 131*        Images:    XR (Most Recent). CXR reviewed by me and compared with previous CXR Results from Hospital Encounter encounter on 09/27/19   XR CHEST PORT    Narrative EXAM: XR CHEST PORT    INDICATION: evaluate for pneumonia    COMPARISON: Recent prior. FINDINGS: A portable AP radiograph of the chest was obtained at 0513 hours. Telemetry leads have been removed. Question retrocardiac opacity. The cardiac  and mediastinal contours are unchanged. The bones and soft tissues are grossly  stable. Impression IMPRESSION:     Question retrocardiac opacity.     Stable enlarged cardiac silhouette        CT (Most Recent) Results from Hospital Encounter encounter on 07/05/19   CT HEAD WO CONT    Narrative EXAM: CT HEAD W/O CONTRAST    INDICATION: Syncope, difficulty speaking    COMPARISON: CT head 6/30/2019    TECHNIQUE: Axial CT imaging of the head was performed without intravenous  contrast.  Additional coronal and sagittal reconstructions were performed. One  or more dose reduction techniques were used on this CT: automated exposure  control, adjustment of the mAs and/or kVp according to patient's size, and  iterative reconstruction techniques. The specific techniques utilized on this CT  exam have been documented in the patient's electronic medical record. Digital  Imaging and Communications in Medicine (DICOM) format image data are available  to nonaffiliated external healthcare facilities or entities on a secure, media  free, reciprocally searchable basis with patient authorization for at least a  12-month period after this study. _______________    FINDINGS:    VENTRICLES/EXTRA-AXIAL SPACES: The ventricles and sulci are normal in their size  and configuration. BRAIN PARENCHYMA: There is no evidence of acute intracranial hemorrhage, mass  effect, midline shift, or herniation. No definite CT evidence of acute cortical  infarct is seen. A minimal amount of hypodense white matter lesions are seen  within the periventricular and subcortical white matter which are nonspecific,  but likely represent chronic small vessel changes. ORBITS: The visualized orbits are unremarkable. PARANASAL SINUSES: Visualized paranasal sinuses are clear. MASTOID AIR CELLS: Visualized mastoid air cells are clear. OSSEOUS STRUCTURES: No fracture is seen. OTHER: None.      _______________      Impression IMPRESSION:    1. No acute intracranial hemorrhage, mass effect, midline shift, or herniation. No definite CT evidence of acute cortical infarct is seen.   Please note that  noncontrast head CT may be normal in early acute infarct. 2. Stable, minimal nonspecific white matter disease likely representing chronic  small vessel changes. CRITICAL RESULT:    These critical results on this CODE S patient were directly communicated to Dr. Aaron Keith on 7/5/2019 9:43 AM.  Results understood and acknowledged. EKG No results found for this or any previous visit. I have personally reviewed the old medical records and patient's previously known baseline  creatinine     Plan / Recommendation:      1. Arf,was on several nephrotoxic meds. will watch  2.urinary retention,probably related to [place] and psych meds. watch. ulisses if no improvement    D/w Enrique Joya MD  Nephrology  10/4/2019

## 2019-10-04 NOTE — PROGRESS NOTES
Bedside interdisciplinary rounds conducted with Dr. Nancy Shah and Hong Osborne CM. Patient sleeping in bed.

## 2019-10-04 NOTE — CDMP QUERY
After further study, do you concur with the findings of \"DEMARCUS, NON oliguric, ATN vs AIN\" noted in the Nephrology Consultation note? Is so; please document in your progress notes and D/C summary. ? ATN vs AIN 
? ARF only ? Other Explanation of the clinical findings ? Clinically Undetermined (no explanation for clinical findings) The medical record reflects the following clinical findings, risk factors and treatment:  
 
Risk Factors:  DEMARCUS, NON oliguric, ATN vs AIN, creatinine jumped from 2nd October , Diffrential would be ATN from nephrotoxicity from meds such as  she has been receiving hctz, lisinopril since admission. She recievied lasix and ibuprofen on admission. Or AIN from amoxicillin, sepsis Hospitalist note: ARF Clinical Indicators:   BUN/CR on admission: 11/1.12; now up to 53/2.28 Treatment: Per Nephrology consult:  hctz and antibiotics have been discontinued, avoid nsadis and diuretics for now. I agree with  NS 1L for now, monitor response. Thank you, Yossi Zhu RN/CCDS 
[phone]

## 2019-10-04 NOTE — ROUTINE PROCESS
Bedside and Verbal shift change report given to [facility] (oncoming nurse) by Joel Barron RN (offgoing nurse). Report included the following information SBAR, Kardex, Intake/Output and Recent Results.

## 2019-10-04 NOTE — PROGRESS NOTES
Patient voided 200 mL, post void residual 0 mL. Patient requesting percocet. Dr. Miguel A Abrams aware. D/C gtz insertion order and no percocet ordered. Patient states will leave AMA. Has oxygen tanks. Dr. Miguel A Abrams notified. AMA paperwork signed. Patient left unit.

## 2019-10-04 NOTE — PROGRESS NOTES
[facility] Hospitalist Group  Progress Note    Patient: Iesha General Age: 53 y.o. : 1965 MR#: [id number] SSN: [id number]  Date: 10/4/2019     Subjective/24-hour events:     Pt seen & evaluated - SOB improved - feels much better     Assessment:   COPD with acute exacerbation  Acute hypercapnic respiratory failure requiring BiPAP, resolved  Hypertensive urgency  Concern for pneumonia, doubt  Chronic prescription narcotic use   Obesity, BMI 39.4  Active smoker  Bacteremia - E fecalis   ARF - Non oliguric ATN vs AIN     Plan:  Add pulmicort nebs. Switch to Po steroids, continue bronchodilators as ordered. Wheezing improved   Based on previous experience, patient is notoriously slow to improve. Pulm consulted - evaluated pt , continue current Rx Plan   Bronchodilator therapy and usual pulmonary hygiene as ordered. Bacteremia - ID consulted - stop all Abx given likely contaminant   BPs stable - continue current regimen. Needs to stop smoking - previously discussed. No plan to increase BZD or narcotic. ARF - stopped ACE - Nephro consulted   Mobilize as able. Follow. Pt has a drug seeking behavior - she mentions she has been on percocet prn for the last 13 yrs - she was f/u with pain mx in the past but hasn't seen a physician recently - She was upset that her percocet was stopped - she is also on Xanax & it has been discontinued but it appears that she gets started on it at night   Please do not given any pain meds or xanax to the pt       Case discussed with:  [x]Patient  []Family  [x]Nursing  [x]Case Management  DVT Prophylaxis:  []Lovenox  [x]Hep SQ  []SCDs  []Coumadin   []On Heparin gtt    Objective:   VS:   Visit Vitals  /76 (BP 1 Location: Right arm, BP Patient Position: At rest)   Pulse 60   Temp 98.3 °F (36.8 °C)   Resp 18   Ht 4' 11.02\" (1.499 m)   Wt 93.1 kg (205 lb 3.2 oz)   LMP 2009   SpO2 100%   Breastfeeding?  No   BMI 41.42 kg/m²      Tmax/24hrs: Temp (24hrs), Av.7 °F (36.5 °C), Min:97 °F (36.1 °C), Max:98.3 °F (36.8 °C)      Intake/Output Summary (Last 24 hours) at 10/4/2019 1236  Last data filed at 10/4/2019 1158  Gross per 24 hour   Intake    Output 1200 ml   Net -1200 ml       General:  In NAD. Nontoxic-appearing. Cardiovascular:  RRR. Pulmonary:  Wheezing improved since admission    GI:  Abdomen soft, NTTP. Extremities:  Warm, no ischemia.     Neuro:  Awake and alert, motor nonfocal.    Labs:    Recent Results (from the past 24 hour(s))   GLUCOSE, POC    Collection Time: 10/03/19  8:32 PM   Result Value Ref Range    Glucose (POC) 114 (H) 70 - 110 mg/dL       Signed By: Melida Mann MD     2019

## 2019-10-04 NOTE — PROGRESS NOTES
Met with patient at bedside. Plan remains home with home health.       Obdulio Tapia RN BSN  Care Manager  [phone]

## 2019-10-06 ENCOUNTER — APPOINTMENT (OUTPATIENT)
Dept: GENERAL RADIOLOGY | Age: 54
End: 2019-10-06
Attending: EMERGENCY MEDICINE
Payer: MEDICAID

## 2019-10-06 ENCOUNTER — HOSPITAL ENCOUNTER (EMERGENCY)
Age: 54
Discharge: HOME | End: 2019-10-06
Attending: EMERGENCY MEDICINE
Payer: MEDICAID

## 2019-10-06 VITALS
BODY MASS INDEX: 41.38 KG/M2 | OXYGEN SATURATION: 96 % | HEART RATE: 83 BPM | WEIGHT: 205 LBS | RESPIRATION RATE: 15 BRPM | DIASTOLIC BLOOD PRESSURE: 60 MMHG | SYSTOLIC BLOOD PRESSURE: 156 MMHG | TEMPERATURE: 98.5 F

## 2019-10-06 DIAGNOSIS — J44.1 COPD EXACERBATION (HCC): Primary | ICD-10-CM

## 2019-10-06 LAB
ALBUMIN SERPL-MCNC: 4.1 G/DL (ref 3.4–5)
ALBUMIN/GLOB SERPL: 1.1 {RATIO} (ref 0.8–1.7)
ALP SERPL-CCNC: 98 U/L (ref 45–117)
ALT SERPL-CCNC: 30 U/L (ref 13–56)
ANION GAP SERPL CALC-SCNC: 5 MMOL/L (ref 3–18)
APPEARANCE UR: CLEAR
AST SERPL-CCNC: 14 U/L (ref 10–38)
ATRIAL RATE: 76 BPM
BACTERIA URNS QL MICRO: NEGATIVE /HPF
BASOPHILS # BLD: 0 K/UL (ref 0–0.1)
BASOPHILS NFR BLD: 0 % (ref 0–2)
BILIRUB SERPL-MCNC: 0.9 MG/DL (ref 0.2–1)
BILIRUB UR QL: NEGATIVE
BUN SERPL-MCNC: 25 MG/DL (ref 7–18)
BUN/CREAT SERPL: 26 (ref 12–20)
CALCIUM SERPL-MCNC: 8.7 MG/DL (ref 8.5–10.1)
CALCULATED P AXIS, ECG09: 86 DEGREES
CALCULATED R AXIS, ECG10: 60 DEGREES
CALCULATED T AXIS, ECG11: 45 DEGREES
CHLORIDE SERPL-SCNC: 97 MMOL/L (ref 100–111)
CK MB CFR SERPL CALC: 3 % (ref 0–4)
CK MB SERPL-MCNC: 2 NG/ML (ref 5–25)
CK SERPL-CCNC: 67 U/L (ref 26–192)
CO2 SERPL-SCNC: 33 MMOL/L (ref 21–32)
COLOR UR: YELLOW
CREAT SERPL-MCNC: 0.95 MG/DL (ref 0.6–1.3)
DIAGNOSIS, 93000: NORMAL
DIFFERENTIAL METHOD BLD: ABNORMAL
EOSINOPHIL # BLD: 0.4 K/UL (ref 0–0.4)
EOSINOPHIL NFR BLD: 3 % (ref 0–5)
EPITH CASTS URNS QL MICRO: NORMAL /LPF (ref 0–5)
ERYTHROCYTE [DISTWIDTH] IN BLOOD BY AUTOMATED COUNT: 13.5 % (ref 11.6–14.5)
GLOBULIN SER CALC-MCNC: 3.6 G/DL (ref 2–4)
GLUCOSE SERPL-MCNC: 79 MG/DL (ref 74–99)
GLUCOSE UR STRIP.AUTO-MCNC: NEGATIVE MG/DL
HCT VFR BLD AUTO: 45 % (ref 35–45)
HGB BLD-MCNC: 15.2 G/DL (ref 12–16)
HGB UR QL STRIP: NEGATIVE
KETONES UR QL STRIP.AUTO: NEGATIVE MG/DL
LEUKOCYTE ESTERASE UR QL STRIP.AUTO: ABNORMAL
LYMPHOCYTES # BLD: 3 K/UL (ref 0.9–3.6)
LYMPHOCYTES NFR BLD: 20 % (ref 21–52)
MCH RBC QN AUTO: 30.1 PG (ref 24–34)
MCHC RBC AUTO-ENTMCNC: 33.8 G/DL (ref 31–37)
MCV RBC AUTO: 89.1 FL (ref 74–97)
MONOCYTES # BLD: 1.1 K/UL (ref 0.05–1.2)
MONOCYTES NFR BLD: 8 % (ref 3–10)
NEUTS SEG # BLD: 10.1 K/UL (ref 1.8–8)
NEUTS SEG NFR BLD: 69 % (ref 40–73)
NITRITE UR QL STRIP.AUTO: NEGATIVE
P-R INTERVAL, ECG05: 130 MS
PH UR STRIP: 6.5 [PH] (ref 5–8)
PLATELET # BLD AUTO: 266 K/UL (ref 135–420)
PMV BLD AUTO: 10.4 FL (ref 9.2–11.8)
POTASSIUM SERPL-SCNC: 3.4 MMOL/L (ref 3.5–5.5)
PROT SERPL-MCNC: 7.7 G/DL (ref 6.4–8.2)
PROT UR STRIP-MCNC: NEGATIVE MG/DL
Q-T INTERVAL, ECG07: 382 MS
QRS DURATION, ECG06: 76 MS
QTC CALCULATION (BEZET), ECG08: 429 MS
RBC # BLD AUTO: 5.05 M/UL (ref 4.2–5.3)
RBC #/AREA URNS HPF: NORMAL /HPF (ref 0–5)
SODIUM SERPL-SCNC: 135 MMOL/L (ref 136–145)
SP GR UR REFRACTOMETRY: 1 (ref 1–1.03)
TROPONIN I SERPL-MCNC: 0.03 NG/ML (ref 0–0.04)
UROBILINOGEN UR QL STRIP.AUTO: 0.2 EU/DL (ref 0.2–1)
VENTRICULAR RATE, ECG03: 76 BPM
WBC # BLD AUTO: 14.6 K/UL (ref 4.6–13.2)
WBC URNS QL MICRO: NORMAL /HPF (ref 0–5)
YEAST URNS QL MICRO: NORMAL

## 2019-10-06 PROCEDURE — 96375 TX/PRO/DX INJ NEW DRUG ADDON: CPT

## 2019-10-06 PROCEDURE — 80053 COMPREHEN METABOLIC PANEL: CPT

## 2019-10-06 PROCEDURE — 96365 THER/PROPH/DIAG IV INF INIT: CPT

## 2019-10-06 PROCEDURE — 74011000250 HC RX REV CODE- 250: Performed by: STUDENT IN AN ORGANIZED HEALTH CARE EDUCATION/TRAINING PROGRAM

## 2019-10-06 PROCEDURE — 93005 ELECTROCARDIOGRAM TRACING: CPT

## 2019-10-06 PROCEDURE — 82550 ASSAY OF CK (CPK): CPT

## 2019-10-06 PROCEDURE — 85025 COMPLETE CBC W/AUTO DIFF WBC: CPT

## 2019-10-06 PROCEDURE — 96366 THER/PROPH/DIAG IV INF ADDON: CPT

## 2019-10-06 PROCEDURE — 74011250637 HC RX REV CODE- 250/637: Performed by: STUDENT IN AN ORGANIZED HEALTH CARE EDUCATION/TRAINING PROGRAM

## 2019-10-06 PROCEDURE — 99285 EMERGENCY DEPT VISIT HI MDM: CPT

## 2019-10-06 PROCEDURE — 74011250636 HC RX REV CODE- 250/636: Performed by: STUDENT IN AN ORGANIZED HEALTH CARE EDUCATION/TRAINING PROGRAM

## 2019-10-06 PROCEDURE — 81001 URINALYSIS AUTO W/SCOPE: CPT

## 2019-10-06 PROCEDURE — 71045 X-RAY EXAM CHEST 1 VIEW: CPT

## 2019-10-06 RX ORDER — DOXYCYCLINE HYCLATE 100 MG
100 TABLET ORAL 2 TIMES DAILY
Qty: 14 TAB | Refills: 0 | Status: SHIPPED | OUTPATIENT
Start: 2019-10-06 | End: 2019-10-13

## 2019-10-06 RX ORDER — MAGNESIUM SULFATE HEPTAHYDRATE 40 MG/ML
2 INJECTION, SOLUTION INTRAVENOUS
Status: COMPLETED | OUTPATIENT
Start: 2019-10-06 | End: 2019-10-06

## 2019-10-06 RX ORDER — IPRATROPIUM BROMIDE AND ALBUTEROL SULFATE 2.5; .5 MG/3ML; MG/3ML
3 SOLUTION RESPIRATORY (INHALATION)
Status: COMPLETED | OUTPATIENT
Start: 2019-10-06 | End: 2019-10-06

## 2019-10-06 RX ORDER — ACETAMINOPHEN 500 MG
1000 TABLET ORAL
Status: DISCONTINUED | OUTPATIENT
Start: 2019-10-06 | End: 2019-10-06 | Stop reason: HOSPADM

## 2019-10-06 RX ORDER — CLONIDINE HYDROCHLORIDE 0.1 MG/1
0.2 TABLET ORAL
Status: COMPLETED | OUTPATIENT
Start: 2019-10-06 | End: 2019-10-06

## 2019-10-06 RX ORDER — PREDNISONE 20 MG/1
60 TABLET ORAL DAILY
Qty: 15 TAB | Refills: 0 | Status: SHIPPED | OUTPATIENT
Start: 2019-10-06 | End: 2019-10-11

## 2019-10-06 RX ADMIN — METHYLPREDNISOLONE SODIUM SUCCINATE 125 MG: 125 INJECTION, POWDER, FOR SOLUTION INTRAMUSCULAR; INTRAVENOUS at 05:44

## 2019-10-06 RX ADMIN — IPRATROPIUM BROMIDE AND ALBUTEROL SULFATE 3 ML: .5; 3 SOLUTION RESPIRATORY (INHALATION) at 05:44

## 2019-10-06 RX ADMIN — MAGNESIUM SULFATE 2 G: 2 INJECTION INTRAVENOUS at 05:44

## 2019-10-06 RX ADMIN — IPRATROPIUM BROMIDE AND ALBUTEROL SULFATE 3 ML: .5; 3 SOLUTION RESPIRATORY (INHALATION) at 04:11

## 2019-10-06 RX ADMIN — CLONIDINE HYDROCHLORIDE 0.2 MG: 0.1 TABLET ORAL at 05:42

## 2019-10-06 NOTE — ED NOTES
48-year-old female history of COPD 3 L home oxygen presents with shortness of breath. Reportedly left the hospital AMA. EKG shows sinus at 76 with a normal axis and normal intervals there is no ST elevation or depression and no hypertrophy. Patient's mildly hypertensive but this appears to be baseline. Oxygen's 100% on her baseline 3 L. Heart rate of 80. Will evaluate and treat give some albuterol steroids mag and assess need for admission.

## 2019-10-06 NOTE — DISCHARGE INSTRUCTIONS
Patient Education        COPD Exacerbation Plan: Care Instructions  Your Care Instructions    If you have chronic obstructive pulmonary disease (COPD), your usual shortness of breath could suddenly get worse. You may start coughing more and have more mucus. This flare-up is called a COPD exacerbation (say \"[id number]-[id number]-[id number]\"). A lung infection or air pollution could set off an exacerbation. Sometimes it can happen after a quick change in temperature or being around chemicals. Work with your doctor to make a plan for dealing with an exacerbation. You can better manage it if you plan ahead. Follow-up care is a key part of your treatment and safety. Be sure to make and go to all appointments, and call your doctor if you are having problems. It's also a good idea to know your test results and keep a list of the medicines you take. How can you care for yourself at home? During an exacerbation  · Do not panic if you start to have one. Quick treatment at home may help you prevent serious breathing problems. If you have a COPD exacerbation plan that you developed with your doctor, follow it. · Take your medicines exactly as your doctor tells you.  ? Use your inhaler as directed by your doctor. If your symptoms do not get better after you use your medicine, have someone take you to the emergency room. Call an ambulance if necessary. ? With inhaled medicines, a spacer or a nebulizer may help you get more medicine to your lungs. Ask your doctor or pharmacist how to use them properly. Practice using the spacer in front of a mirror before you have an exacerbation. This may help you get the medicine into your lungs quickly. ? If your doctor has given you steroid pills, take them as directed. ? Your doctor may have given you a prescription for antibiotics, which you can fill if you need it. ? Talk to your doctor if you have any problems with your medicine.  And call your doctor if you have to use your antibiotic or steroid pills. Preventing an exacerbation  · Do not smoke. This is the most important step you can take to prevent more damage to your lungs and prevent problems. If you already smoke, it is never too late to stop. If you need help quitting, talk to your doctor about stop-smoking programs and medicines. These can increase your chances of quitting for good. · Take your daily medicines as prescribed. · Avoid colds and flu. ? Get a pneumococcal vaccine. ? Get a flu vaccine each year, as soon as it is available. Ask those you live or work with to do the same, so they will not get the flu and infect you. ? Try to stay away from people with colds or the flu. ? Wash your hands often. · Avoid secondhand smoke; air pollution; cold, dry air; hot, humid air; and high altitudes. Stay at home with your windows closed when air pollution is bad. · Learn breathing techniques for COPD, such as breathing through pursed lips. These techniques can help you breathe easier during an exacerbation. When should you call for help? Call 911 anytime you think you may need emergency care. For example, call if:    · You have severe trouble breathing.     · You have severe chest pain.    Call your doctor now or seek immediate medical care if:    · You have new or worse shortness of breath.     · You develop new chest pain.     · You are coughing more deeply or more often, especially if you notice more mucus or a change in the color of your mucus.     · You cough up blood.     · You have new or increased swelling in your legs or belly.     · You have a fever.    Watch closely for changes in your health, and be sure to contact your doctor if:    · You need to use your antibiotic or steroid pills.     · Your symptoms are getting worse. Where can you learn more? Go to http://magda-kadie.info/. Enter F689 in the search box to learn more about \"COPD Exacerbation Plan: Care Instructions. \"  Current as of: June 9, 2019  Content Version: 12.2  © 8218-7391 [employer]. Care instructions adapted under license by [employer] (which disclaims liability or warranty for this information). If you have questions about a medical condition or this instruction, always ask your healthcare professional. [address] any warranty or liability for your use of this information. Patient Education        Chronic Obstructive Pulmonary Disease (COPD): Care Instructions  Your Care Instructions    Chronic obstructive pulmonary disease (COPD) is a general term for a group of lung diseases, including emphysema and chronic bronchitis. People with COPD have decreased airflow in and out of the lungs, which makes it hard to breathe. The airways also can get clogged with thick mucus. Cigarette smoking is a major cause of COPD. Although there is no cure for COPD, you can slow its progress. Following your treatment plan and taking care of yourself can help you feel better and live longer. Follow-up care is a key part of your treatment and safety. Be sure to make and go to all appointments, and call your doctor if you are having problems. It's also a good idea to know your test results and keep a list of the medicines you take. How can you care for yourself at home?   Staying healthy    · Do not smoke. This is the most important step you can take to prevent more damage to your lungs. If you need help quitting, talk to your doctor about stop-smoking programs and medicines. These can increase your chances of quitting for good.     · Avoid colds and flu. Get a pneumococcal vaccine shot. If you have had one before, ask your doctor whether you need a second dose. Get the flu vaccine every fall. If you must be around people with colds or the flu, wash your hands often.     · Avoid secondhand smoke, air pollution, and high altitudes. Also avoid cold, dry air and hot, humid air.  Stay at home with your windows closed when air pollution is bad.    Medicines and oxygen therapy    · Take your medicines exactly as prescribed. Call your doctor if you think you are having a problem with your medicine.     · You may be taking medicines such as:  ? Bronchodilators. These help open your airways and make breathing easier. Bronchodilators are either short-acting (work for 6 to 9 hours) or long-acting (work for 24 hours). You inhale most bronchodilators, so they start to act quickly. Always carry your quick-relief inhaler with you in case you need it while you are away from home. ? Corticosteroids (prednisone, budesonide). These reduce airway inflammation. They come in pill or inhaled form. You must take these medicines every day for them to work well.     · A spacer may help you get more inhaled medicine to your lungs. Ask your doctor or pharmacist if a spacer is right for you. If it is, ask how to use it properly.     · Do not take any vitamins, over-the-counter medicine, or herbal products without talking to your doctor first.     · If your doctor prescribed antibiotics, take them as directed. Do not stop taking them just because you feel better. You need to take the full course of antibiotics.     · Oxygen therapy boosts the amount of oxygen in your blood and helps you breathe easier. Use the flow rate your doctor has recommended, and do not change it without talking to your doctor first.   Activity    · Get regular exercise. Walking is an easy way to get exercise. Start out slowly, and walk a little more each day.     · Pay attention to your breathing.  You are exercising too hard if you cannot talk while you are exercising.     · Take short rest breaks when doing household chores and other activities.     · Learn breathing methods--such as breathing through pursed lips--to help you become less short of breath.     · If your doctor has not set you up with a pulmonary rehabilitation program, talk to him or her about whether rehab is right for you. Rehab includes exercise programs, education about your disease and how to manage it, help with diet and other changes, and emotional support. Diet    · Eat regular, healthy meals. Use bronchodilators about 1 hour before you eat to make it easier to eat. Eat several small meals instead of three large ones. Drink beverages at the end of the meal. Avoid foods that are hard to chew.     · Eat foods that contain protein so that you do not lose muscle mass.     · Talk with your doctor if you gain too much weight or if you lose weight without trying.    Mental health    · Talk to your family, friends, or a therapist about your feelings. It is normal to feel frightened, angry, hopeless, helpless, and even guilty. Talking openly about bad feelings can help you cope. If these feelings last, talk to your doctor. When should you call for help? Call 911 anytime you think you may need emergency care. For example, call if:    · You have severe trouble breathing.    Call your doctor now or seek immediate medical care if:    · You have new or worse trouble breathing.     · You cough up blood.     · You have a fever.    Watch closely for changes in your health, and be sure to contact your doctor if:    · You cough more deeply or more often, especially if you notice more mucus or a change in the color of your mucus.     · You have new or worse swelling in your legs or belly.     · You are not getting better as expected. Where can you learn more? Go to http://magda-kadie.info/. María Ji in the search box to learn more about \"Chronic Obstructive Pulmonary Disease (COPD): Care Instructions. \"  Current as of: June 9, 2019  Content Version: 12.2  © 0363-5548 [employer]. Care instructions adapted under license by [employer] (which disclaims liability or warranty for this information).  If you have questions about a medical condition or this instruction, always ask your healthcare professional. [address] any warranty or liability for your use of this information. Patient Education        Using a Metered-Dose Inhaler: Care Instructions  Your Care Instructions    A metered-dose inhaler lets you breathe medicine into your lungs quickly. Inhaled medicine works faster than the same medicine in a pill. An inhaler allows you to take less medicine than you would need if you took it as a pill. \"Metered-dose\" means that the inhaler gives a measured amount of medicine each time you use it. A metered-dose inhaler gives medicine in the form of a liquid mist.  Your doctor may want you to use a spacer with your inhaler. A spacer is a chamber that you attach to the inhaler. The chamber holds the medicine before you inhale it. That way, you can inhale the medicine in as many breaths as you need. Doctors recommend using a spacer with most metered-dose inhalers, especially those with corticosteroid medicines. Follow-up care is a key part of your treatment and safety. Be sure to make and go to all appointments, and call your doctor if you are having problems. It's also a good idea to know your test results and keep a list of the medicines you take. How can you care for yourself at home? To get started using your inhaler  · Talk with your health care provider to be sure you are using your inhaler the right way. It might help if you practice using it in front of a mirror. Use the inhaler exactly as prescribed. · Check that you have the correct medicine. If you use more than one inhaler, put a label on each one. This will let you know which one to use at the right time. · Keep track of how much medicine is in the inhaler. Check the label to see how many doses are in the container. If you know how many puffs you can take, you can replace the inhaler before you run out. Ask your health care provider how you can keep track of how much medicine is left.   · Use a spacer if you have problems pressing the inhaler and breathing in at the same time. You also may need a spacer if you are using corticosteroid medicines. · If you are using a corticosteroid inhaler, gargle and rinse out your mouth with water after use. Do not swallow the water. Swallowing the water will increase the chance that the medicine will get into your bloodstream. This may make it more likely that you will have side effects. To use a spacer with an inhaler  1. Shake the inhaler. Remove the inhaler cap, and place the mouthpiece of the inhaler into the spacer. Check the inhaler instructions to see if you need to prime your inhaler before you use it. If it needs priming, follow the instructions on how to prime your inhaler. 2. Remove the cap from the spacer. 3. Hold the inhaler upright with the mouthpiece at the bottom. 4. Tilt your head back a little, and breathe out slowly and completely. 5. Place the spacer's mouthpiece in your mouth. 6. Press down on the inhaler to spray one puff of medicine into the spacer, and then start breathing in slowly. Wait to inhale until after you have pressed down on the inhaler. Some spacers have a whistle. If you hear it, you should breathe in more slowly. 7. Hold your breath for 10 seconds. This will let the medicine settle in your lungs. 8. If you need to take a second dose, wait 30 to 60 seconds to allow the inhaler valve to refill. To use an inhaler without a spacer  1. Shake the inhaler as directed. Remove the cap. Check the instructions to see if you need to prime your inhaler before you use it. If it needs priming, follow the instructions on how to prime your inhaler. 2. Hold the inhaler upright with the mouthpiece at the bottom. 3. Tilt your head back a little, and breathe out slowly and completely. 4. Position the inhaler in one of two ways:  ? You can place the inhaler in your mouth. This is easier for most people.  And it lowers the risk that any of the medicine will get into your eyes. ? Or you can place the inhaler 1 to 2 inches in front of your open mouth, without closing your lips over it. Try to open your mouth as wide as you can. Placing the inhaler in front of your open mouth may be better for getting the medicine into your lungs. But some people may find this too hard to do. 5. Start taking slow, even breaths through your mouth. Press down on the inhaler once, then inhale fully. 6. Hold your breath for 10 seconds. This will let the medicine settle in your lungs. 7. If you need to take a second dose, wait 30 to 60 seconds to allow the inhaler valve to refill. Where can you learn more? Go to http://magda-kadie.info/. Enter K111 in the search box to learn more about \"Using a Metered-Dose Inhaler: Care Instructions. \"  Current as of: June 9, 2019  Content Version: 12.2  © 9869-0661 [employer]. Care instructions adapted under license by [employer] (which disclaims liability or warranty for this information). If you have questions about a medical condition or this instruction, always ask your healthcare professional. [place] [address] any warranty or liability for your use of this information. Patient Education        Chronic Obstructive Pulmonary Disease (COPD) Flare-Ups: Care Instructions  Your Care Instructions    Chronic obstructive pulmonary disease (COPD) is a lung disease that makes it hard to breathe. It is caused by damage to the lungs over many years, usually from smoking. COPD is often a mix of two diseases:  · Chronic bronchitis: The airways that carry air to the lungs (bronchial tubes) get inflamed and make a lot of mucus. This can narrow or block the airways. · Emphysema: In a healthy person, the tiny air sacs in the lungs are like balloons. As you breathe in and out, they get bigger and smaller to move air through your lungs.  But with emphysema, these air sacs are damaged and lose their stretch. Less air gets in and out of the lungs. Many people with COPD have attacks called flare-ups or exacerbations. This is when your usual symptoms quickly get worse and stay worse. The doctor has checked you carefully. But problems can develop later. If you notice any problems or new symptoms, get medical treatment right away. Follow-up care is a key part of your treatment and safety. Be sure to make and go to all appointments, and call your doctor if you are having problems. It's also a good idea to know your test results and keep a list of the medicines you take. How can you care for yourself at home? · Be safe with medicines. Take your medicines exactly as prescribed. Call your doctor if you think you are having a problem with your medicine. You may be taking medicines such as:  ? Bronchodilators. These help open your airways and make breathing easier. ? Corticosteroids. These reduce airway inflammation. They may be given as pills, in a vein, or in an inhaled form. You may go home with pills in addition to an inhaler that you already use. · A spacer may help you get more inhaled medicine to your lungs. Ask your doctor or pharmacist if a spacer is right for you. If it is, ask how to use it properly. · If your doctor prescribed antibiotics, take them as directed. Do not stop taking them just because you feel better. You need to take the full course of antibiotics. · If your doctor prescribed oxygen, use the flow rate your doctor has recommended. Do not change it without talking to your doctor first.  · Do not smoke. Smoking makes COPD worse. If you need help quitting, talk to your doctor about stop-smoking programs and medicines. These can increase your chances of quitting for good. When should you call for help? Call 911 anytime you think you may need emergency care.  For example, call if:    · You have severe trouble breathing.    Call your doctor now or seek immediate medical care if:    · You have new or worse trouble breathing.     · Your coughing or wheezing gets worse.     · You cough up dark brown or bloody mucus (sputum).     · You have a new or higher fever.    Watch closely for changes in your health, and be sure to contact your doctor if:    · You notice more mucus or a change in the color of your mucus.     · You need to use your antibiotic or steroid pills.     · You do not get better as expected. Where can you learn more? Go to http://magda-kadie.info/. Enter Q982 in the search box to learn more about \"Chronic Obstructive Pulmonary Disease (COPD) Flare-Ups: Care Instructions. \"  Current as of: June 9, 2019  Content Version: 12.2  © 9058-3882 [employer]. Care instructions adapted under license by [employer] (which disclaims liability or warranty for this information). If you have questions about a medical condition or this instruction, always ask your healthcare professional. [place] [address] any warranty or liability for your use of this information.

## 2019-10-06 NOTE — ED PROVIDER NOTES
EMERGENCY DEPARTMENT HISTORY AND PHYSICAL EXAM    5:08 AM      Date: 10/6/2019  Patient Name: Nivia Robledo    History of Presenting Illness     Chief Complaint   Patient presents with    Shortness of Breath         History Provided By: Patient and EMS  Location/Duration/Severity/Modifying factors   HPI  54 y/o F  W/ pmhx of COPD and CHF brought in by EMS for difficulty breathing about 24hrs after leaving AMA from hospital for COPD exacerbation. Patient states that she has been short of breath since leaving and has been coughing. She c/o chest tightness related to breathing as well. She is on 3L O2 at home and has not taken her home medications since yesterday morning. She is requesting pain medication for her chest tightness with respiration. PCP: None    Current Facility-Administered Medications   Medication Dose Route Frequency Provider Last Rate Last Dose    acetaminophen (TYLENOL) tablet 1,000 mg  1,000 mg Oral NOW Juan F Pedraza DO        magnesium sulfate 2 g/50 ml IVPB (premix or compounded)  2 g IntraVENous NOW Eugenie Rosado, DO 50 mL/hr at 10/06/19 0544 2 g at 10/06/19 0544     Current Outpatient Medications   Medication Sig Dispense Refill    hydroCHLOROthiazide (HYDRODIURIL) 50 mg tablet Take 25 mg by mouth daily.  QUEtiapine (SEROQUEL) 300 mg tablet Take 300 mg by mouth nightly. Indications: States she lost her daughter      albuterol sulfate 90 mcg/actuation aepb Take 2 Puffs by inhalation every four (4) hours as needed for Cough or Other (Wheezing). 1 Inhaler 0    fluticasone furoate-vilanterol (BREO ELLIPTA) 200-25 mcg/dose inhaler Take 1 Puff by inhalation daily.  lisinopril (PRINIVIL, ZESTRIL) 20 mg tablet Take 20 mg by mouth daily.  amLODIPine (NORVASC) 10 mg tablet Take 1 Tab by mouth daily. 30 Tab 0    butalbital-acetaminophen-caff (FIORICET) -40 mg per capsule Take 1 Cap by mouth every four (4) hours as needed for Pain.  10 Cap 0    arformoterol (BROVANA) 15 mcg/2 mL nebu neb solution 2 mL by Nebulization route two (2) times a day. 60 Vial 0    budesonide (PULMICORT) 0.5 mg/2 mL nbsp 2 mL by Nebulization route two (2) times a day. 60 Each 0    tiotropium (SPIRIVA) 18 mcg inhalation capsule Take 1 Cap by inhalation daily. 30 Cap 0    aspirin 81 mg chewable tablet Take 1 Tab by mouth daily. 30 Tab 0    atorvastatin (LIPITOR) 20 mg tablet Take 1 Tab by mouth nightly. 30 Tab 0    cloNIDine HCl (CATAPRES) 0.2 mg tablet Take 1 Tab by mouth two (2) times a day. 60 Tab 0    docusate sodium (COLACE) 100 mg capsule Take 1 Cap by mouth two (2) times a day. 60 Cap 0    escitalopram oxalate (LEXAPRO) 10 mg tablet Take 1 Tab by mouth every evening. 30 Tab 0    metoprolol tartrate (LOPRESSOR) 25 mg tablet Take 0.5 Tabs by mouth every twelve (12) hours. 30 Tab 0    roflumilast (DALIRESP) 500 mcg tab tablet Take 1 Tab by mouth daily. 30 Tab 0    OTHER Check CBC, CMP, Mg in 4 days, results to PCP immediately, Diagnosis- asthma 1 Each 0    OTHER Incentive spirometry- use as directed 1 Each 0    naloxone (NARCAN) 4 mg/actuation nasal spray Use 1 spray intranasally, then discard. Repeat with new spray every 2 min as needed for opioid overdose symptoms, alternating nostrils. 1 Each 0    budesonide-formoterol (SYMBICORT) 160-4.5 mcg/actuation HFAA Take 2 Puffs by inhalation two (2) times a day. 1 Inhaler 1    ALPRAZolam (XANAX) 1 mg tablet Take 1 Tab by mouth two (2) times a day. Max Daily Amount: 2 mg. 20 Tab 0    famotidine (PEPCID) 20 mg tablet Take 1 Tab by mouth daily. 30 Tab 1    montelukast (SINGULAIR) 10 mg tablet Take 1 Tab by mouth nightly.  30 Tab 1       Past History     Past Medical History:  Past Medical History:   Diagnosis Date    Asthma     CHF (congestive heart failure) (HCC)     Chronic obstructive pulmonary disease (HCC)     Endocrine disease     thyroid issues    Gastrointestinal disorder     \"blockage in my stomach\"    Hypertension Past Surgical History:  History reviewed. No pertinent surgical history. Family History:  History reviewed. No pertinent family history. Social History:  Social History     Tobacco Use    Smoking status: Current Every Day Smoker     Packs/day: 0.50    Smokeless tobacco: Former User   Substance Use Topics    Alcohol use: No     Comment: socially    Drug use: Not Currently     Types: Heroin       Allergies:  No Known Allergies      Review of Systems       Review of Systems   Constitutional: Negative for fever. HENT: Positive for congestion. Respiratory: Positive for cough, chest tightness and shortness of breath. Cardiovascular: Positive for leg swelling. Gastrointestinal: Negative for abdominal pain. Genitourinary: Negative for difficulty urinating. Musculoskeletal: Negative for back pain. Skin: Negative for rash. Neurological: Negative for dizziness. Psychiatric/Behavioral: Negative for confusion. Physical Exam     Visit Vitals  /62   Pulse 77   Temp 98.5 °F (36.9 °C)   Resp 19   Wt 93 kg (205 lb)   LMP 04/14/2009   SpO2 99%   BMI 41.38 kg/m²         Physical Exam   Constitutional: She is oriented to person, place, and time. She appears well-developed and well-nourished. No distress. HENT:   Head: Normocephalic and atraumatic. Mouth/Throat: Oropharynx is clear and moist.   Eyes: Pupils are equal, round, and reactive to light. Conjunctivae and EOM are normal.   Neck: No JVD present. Cardiovascular: Normal rate, regular rhythm, normal heart sounds and intact distal pulses. Exam reveals no gallop and no friction rub. No murmur heard. Pulmonary/Chest: Effort normal. No respiratory distress. She has no rales. Moving very little air throughout   Abdominal: Soft. She exhibits no distension. There is no tenderness. There is no rebound and no guarding. Musculoskeletal: She exhibits edema. Neurological: She is alert and oriented to person, place, and time. Skin: Skin is warm and dry. She is not diaphoretic. Psychiatric: She has a normal mood and affect. Diagnostic Study Results     Labs -  Recent Results (from the past 12 hour(s))   CBC WITH AUTOMATED DIFF    Collection Time: 10/06/19  5:30 AM   Result Value Ref Range    WBC 14.6 (H) 4.6 - 13.2 K/uL    RBC 5.05 4.20 - 5.30 M/uL    HGB 15.2 12.0 - 16.0 g/dL    HCT 45.0 35.0 - 45.0 %    MCV 89.1 74.0 - 97.0 FL    MCH 30.1 24.0 - 34.0 PG    MCHC 33.8 31.0 - 37.0 g/dL    RDW 13.5 11.6 - 14.5 %    PLATELET 271 910 - 442 K/uL    MPV 10.4 9.2 - 11.8 FL    NEUTROPHILS 69 40 - 73 %    LYMPHOCYTES 20 (L) 21 - 52 %    MONOCYTES 8 3 - 10 %    EOSINOPHILS 3 0 - 5 %    BASOPHILS 0 0 - 2 %    ABS. NEUTROPHILS 10.1 (H) 1.8 - 8.0 K/UL    ABS. LYMPHOCYTES 3.0 0.9 - 3.6 K/UL    ABS. MONOCYTES 1.1 0.05 - 1.2 K/UL    ABS. EOSINOPHILS 0.4 0.0 - 0.4 K/UL    ABS. BASOPHILS 0.0 0.0 - 0.1 K/UL    DF AUTOMATED     METABOLIC PANEL, COMPREHENSIVE    Collection Time: 10/06/19  5:30 AM   Result Value Ref Range    Sodium 135 (L) 136 - 145 mmol/L    Potassium 3.4 (L) 3.5 - 5.5 mmol/L    Chloride 97 (L) 100 - 111 mmol/L    CO2 33 (H) 21 - 32 mmol/L    Anion gap 5 3.0 - 18 mmol/L    Glucose 79 74 - 99 mg/dL    BUN 25 (H) 7.0 - 18 MG/DL    Creatinine 0.95 0.6 - 1.3 MG/DL    BUN/Creatinine ratio 26 (H) 12 - 20      GFR est AA >60 >60 ml/min/1.73m2    GFR est non-AA >60 >60 ml/min/1.73m2    Calcium 8.7 8.5 - 10.1 MG/DL    Bilirubin, total 0.9 0.2 - 1.0 MG/DL    ALT (SGPT) 30 13 - 56 U/L    AST (SGOT) 14 10 - 38 U/L    Alk.  phosphatase 98 45 - 117 U/L    Protein, total 7.7 6.4 - 8.2 g/dL    Albumin 4.1 3.4 - 5.0 g/dL    Globulin 3.6 2.0 - 4.0 g/dL    A-G Ratio 1.1 0.8 - 1.7     CARDIAC PANEL,(CK, CKMB & TROPONIN)    Collection Time: 10/06/19  5:30 AM   Result Value Ref Range    CK 67 26 - 192 U/L    CK - MB PENDING ng/ml    CK-MB Index PENDING %    Troponin-I, QT PENDING NG/ML   URINALYSIS W/ RFLX MICROSCOPIC    Collection Time: 10/06/19  5:30 AM   Result Value Ref Range    Color YELLOW      Appearance CLEAR      Specific gravity 1.005 1.005 - 1.030      pH (UA) 6.5 5.0 - 8.0      Protein NEGATIVE  NEG mg/dL    Glucose NEGATIVE  NEG mg/dL    Ketone NEGATIVE  NEG mg/dL    Bilirubin NEGATIVE  NEG      Blood NEGATIVE  NEG      Urobilinogen 0.2 0.2 - 1.0 EU/dL    Nitrites NEGATIVE  NEG      Leukocyte Esterase SMALL (A) NEG     URINE MICROSCOPIC ONLY    Collection Time: 10/06/19  5:30 AM   Result Value Ref Range    WBC 0 to 3 0 - 5 /hpf    RBC NONE 0 - 5 /hpf    Epithelial cells 2+ 0 - 5 /lpf    Bacteria NEGATIVE  NEG /hpf    Yeast RARE NEG       Radiologic Studies -   XR CHEST PORT    (Results Pending)         Medical Decision Making   I am the first provider for this patient. I reviewed the vital signs, available nursing notes, past medical history, past surgical history, family history and social history. Vital Signs-Reviewed the patient's vital signs. EKG: sinus at 76 with a normal axis and normal intervals there is no ST elevation or depression and no hypertrophy. Records Reviewed: Nursing Notes, Old Medical Records and Previous electrocardiograms (Time of Review: 5:08 AM)    ED Course: Progress Notes, Reevaluation, and Consults:         Provider Notes (Medical Decision Making):   MDM  54 y/o F w/ copd and chf presenting with difficulty breathing. Patient satting 100% on her home dose of 3L O2, HR 75, BP elevated but appears at baseline with poor adherence and poor control on multiple agents. Patient left AMA from hospitalization yesterday after arguing with staff about pain medication. She appears to be at baseline but may have a bacterial URI requiring abx. CXR appears at baseline, no acute changes, no PNA or fluid overload evident. Will give nebs, steroids, mag and monitor in ED.    0630 - Labs unremarkable, creatinine significantly improved from last level before discharge.  patient improved, breathing more comfortably and still satting well on baseline O2. Vitals have been stable in ED and she has been comfortable, was able to sleep in her bed around 0600. Patient was walked around ED on her home O2 of 3L and was satting 95% while walking. Patient stable for d/c home with abx, steroids, albuterol and f/u with [facility] tomorrow. Procedures    Critical Care Time:       Diagnosis     Clinical Impression:   1. COPD exacerbation ([address]        Disposition: discharged    Follow-up Information    None          Patient's Medications   Start Taking    No medications on file   Continue Taking    ALBUTEROL SULFATE 90 MCG/ACTUATION AEPB    Take 2 Puffs by inhalation every four (4) hours as needed for Cough or Other (Wheezing). ALPRAZOLAM (XANAX) 1 MG TABLET    Take 1 Tab by mouth two (2) times a day. Max Daily Amount: 2 mg. AMLODIPINE (NORVASC) 10 MG TABLET    Take 1 Tab by mouth daily. ARFORMOTEROL (BROVANA) 15 MCG/2 ML NEBU NEB SOLUTION    2 mL by Nebulization route two (2) times a day. ASPIRIN 81 MG CHEWABLE TABLET    Take 1 Tab by mouth daily. ATORVASTATIN (LIPITOR) 20 MG TABLET    Take 1 Tab by mouth nightly. BUDESONIDE (PULMICORT) 0.5 MG/2 ML NBSP    2 mL by Nebulization route two (2) times a day. BUDESONIDE-FORMOTEROL (SYMBICORT) 160-4.5 MCG/ACTUATION HFAA    Take 2 Puffs by inhalation two (2) times a day. BUTALBITAL-ACETAMINOPHEN-CAFF (FIORICET) -40 MG PER CAPSULE    Take 1 Cap by mouth every four (4) hours as needed for Pain. CLONIDINE HCL (CATAPRES) 0.2 MG TABLET    Take 1 Tab by mouth two (2) times a day. DOCUSATE SODIUM (COLACE) 100 MG CAPSULE    Take 1 Cap by mouth two (2) times a day. ESCITALOPRAM OXALATE (LEXAPRO) 10 MG TABLET    Take 1 Tab by mouth every evening. FAMOTIDINE (PEPCID) 20 MG TABLET    Take 1 Tab by mouth daily. FLUTICASONE FUROATE-VILANTEROL (BREO ELLIPTA) 200-25 MCG/DOSE INHALER    Take 1 Puff by inhalation daily.     HYDROCHLOROTHIAZIDE (HYDRODIURIL) 50 MG TABLET    Take 25 mg by mouth daily.    LISINOPRIL (PRINIVIL, ZESTRIL) 20 MG TABLET    Take 20 mg by mouth daily. METOPROLOL TARTRATE (LOPRESSOR) 25 MG TABLET    Take 0.5 Tabs by mouth every twelve (12) hours. MONTELUKAST (SINGULAIR) 10 MG TABLET    Take 1 Tab by mouth nightly. NALOXONE (NARCAN) 4 MG/ACTUATION NASAL SPRAY    Use 1 spray intranasally, then discard. Repeat with new spray every 2 min as needed for opioid overdose symptoms, alternating nostrils. OTHER    Check CBC, CMP, Mg in 4 days, results to PCP immediately, Diagnosis- asthma    OTHER    Incentive spirometry- use as directed    QUETIAPINE (SEROQUEL) 300 MG TABLET    Take 300 mg by mouth nightly. Indications: States she lost her daughter    ROFLUMILAST (DALIRESP) 500 MCG TAB TABLET    Take 1 Tab by mouth daily. TIOTROPIUM (SPIRIVA) 18 MCG INHALATION CAPSULE    Take 1 Cap by inhalation daily. These Medications have changed    No medications on file   Stop Taking    No medications on file     Disclaimer: Sections of this note are dictated using utilizing voice recognition software. Minor typographical errors may be present. If questions arise, please do not hesitate to contact me or call our department.

## 2019-10-06 NOTE — ED TRIAGE NOTES
Patient was just discharged from inpatient care for pneumonia and is still taking ABT. Patient is oxygen dependant at 3L/M via nasal canula and uses CPAP at night. Patient began having worsening shortness of breath and a productive cough. Bilateral inspiratory wheezing.

## 2019-10-08 NOTE — DISCHARGE SUMMARY
Discharge Summary    Patient: Iesha Mccray MRN: [id number]  CSN: [id number]    YOB: 1965  Age: 53 y.o. Sex: female    DOA: 9/27/2019 LOS:  LOS: 6 days   Discharge Date: 10/4/2019     Admission Diagnoses: CAP (community acquired pneumonia) [J18.9]  COPD (chronic obstructive pulmonary disease) ([facility] [address] [J44.9]    Discharge Diagnoses:    CAP   COPD exac   Acute hypercapneic Resp failure   HTN   Non compliance with meds   H/o Asthma   Chr smoker   Bacteremia - contaminant   Obesity   ARF   Chr narcotic abuse           Discharge Condition: Stable    Consults: Infectious Disease, Nephrology and Pulmonary/Critical Care    PHYSICAL EXAM  Visit Vitals  /60 (BP 1 Location: Right arm, BP Patient Position: At rest)   Pulse 62   Temp 98.1 °F (36.7 °C)   Resp 18   Ht 4' 11.02\" (1.499 m)   Wt 93.1 kg (205 lb 3.2 oz)   SpO2 90%   Breastfeeding? No   BMI 41.42 kg/m²       General: Alert, cooperative, no acute distress    HEENT: PERRLA, EOMI. Anicteric sclerae. Lungs:  Decreased BS in bases   Heart:  Regular rate and Rhythm. Abdomen: Soft, Non distended, Non tender. + Bowel sounds. Extremities: No edema/ cyanosis/ clubbing  Psych:   Good insight. Not anxious or agitated. Neurologic:  AA oriented X 3. Moves all extremities. HPI:  Iesha Mccray is a 53 y.o.  female who has PMH of COPD, tobacco use, HTN nad dHF   Pt presented to ER in severe respiratory distress with heavy wheezing and rhonchi that required placing her on BIPAP. Pt states that she started with flu like sxs 2 weeks ago and has progressively gotten worse in the last 2 days with productive cough and heavy wheezing despite using her inhalers. In ER, ABG showed hypercapnia  With acidotic PH. Her BP highly elevated ut denied having fever at home and had none in Er.  CXR showed possible Rt sided PNA but mild edema   Pt was given multiple nebs , steroids and Lasix x 1 but continued to have heavy wheezing and will be admitted for further MGT          Hospital Course:   Pt was admitted to the hosp with increased wheezing, SOB & Acute hypercapneic Resp failure - pt mentions that she is a chr smoker & currently smokes 1/2 ppd   She was started on IV Abx , IV steroids & BD - she is notoriously slow to improve to Pulm was consulted   She also developed ARF - Nephrology consulted - likely pre renal - started on IVF   Pt is a chronic narcotic abuser - she wanted her Percocet & Xanax while she was in the hosp - she was found outside the hosp on one occasion & then brought back inside   After that she ripped her IV line   Refused to do a UDS   Her narcotics & Xanax was stopped & she was very upset   She was found to be sleepy on multiple occasions likely from meds   She says she has been on Narcotics for a long time but was unable to provide any info about who was giving her the meds   She was informed that we will be holding her meds going forward - she became upset & signed out AMA     Imaging:  CXR       Procedures:   None         Discharge Medications:   None   Pt signed out [place]       Minutes spent on discharge: 47 minutes spent coordinating this discharge (review instructions/follow-up, prescriptions, preparing report for sign off)    Bishop Elena MD  10/8/2019 11:43 AM

## 2019-10-13 ENCOUNTER — ANESTHESIA EVENT (OUTPATIENT)
Dept: SURGERY | Age: 54
DRG: 227 | End: 2019-10-13
Payer: MEDICAID

## 2019-10-13 ENCOUNTER — ANESTHESIA (OUTPATIENT)
Dept: SURGERY | Age: 54
DRG: 227 | End: 2019-10-13
Payer: MEDICAID

## 2019-10-13 ENCOUNTER — HOSPITAL ENCOUNTER (INPATIENT)
Age: 54
LOS: 4 days | Discharge: HOME | DRG: 227 | End: 2019-10-17
Attending: EMERGENCY MEDICINE | Admitting: SURGERY
Payer: MEDICAID

## 2019-10-13 DIAGNOSIS — K42.0 INCARCERATED UMBILICAL HERNIA: Primary | ICD-10-CM

## 2019-10-13 DIAGNOSIS — G89.18 POSTOPERATIVE PAIN: ICD-10-CM

## 2019-10-13 PROBLEM — R07.1 CHEST PAIN ON BREATHING: Status: RESOLVED | Noted: 2017-03-10 | Resolved: 2019-10-13

## 2019-10-13 PROBLEM — J18.9 CAP (COMMUNITY ACQUIRED PNEUMONIA): Status: RESOLVED | Noted: 2019-09-28 | Resolved: 2019-10-13

## 2019-10-13 PROBLEM — K43.0 INCARCERATED INCISIONAL HERNIA: Status: ACTIVE | Noted: 2019-10-13

## 2019-10-13 LAB
ALBUMIN SERPL-MCNC: 4 G/DL (ref 3.4–5)
ALBUMIN/GLOB SERPL: 1.1 {RATIO} (ref 0.8–1.7)
ALP SERPL-CCNC: 99 U/L (ref 45–117)
ALT SERPL-CCNC: 22 U/L (ref 13–56)
ANION GAP SERPL CALC-SCNC: 8 MMOL/L (ref 3–18)
AST SERPL-CCNC: 10 U/L (ref 10–38)
BASOPHILS # BLD: 0 K/UL (ref 0–0.1)
BASOPHILS NFR BLD: 0 % (ref 0–2)
BILIRUB DIRECT SERPL-MCNC: 0.1 MG/DL (ref 0–0.2)
BILIRUB SERPL-MCNC: 0.4 MG/DL (ref 0.2–1)
BUN SERPL-MCNC: 14 MG/DL (ref 7–18)
BUN/CREAT SERPL: 12 (ref 12–20)
CALCIUM SERPL-MCNC: 9.4 MG/DL (ref 8.5–10.1)
CHLORIDE SERPL-SCNC: 109 MMOL/L (ref 100–111)
CO2 SERPL-SCNC: 26 MMOL/L (ref 21–32)
CREAT SERPL-MCNC: 1.15 MG/DL (ref 0.6–1.3)
DIFFERENTIAL METHOD BLD: ABNORMAL
EOSINOPHIL # BLD: 0.1 K/UL (ref 0–0.4)
EOSINOPHIL NFR BLD: 1 % (ref 0–5)
ERYTHROCYTE [DISTWIDTH] IN BLOOD BY AUTOMATED COUNT: 13.3 % (ref 11.6–14.5)
GLOBULIN SER CALC-MCNC: 3.6 G/DL (ref 2–4)
GLUCOSE SERPL-MCNC: 143 MG/DL (ref 74–99)
HCT VFR BLD AUTO: 42.4 % (ref 35–45)
HGB BLD-MCNC: 14.1 G/DL (ref 12–16)
LYMPHOCYTES # BLD: 2.9 K/UL (ref 0.9–3.6)
LYMPHOCYTES NFR BLD: 23 % (ref 21–52)
MCH RBC QN AUTO: 29.9 PG (ref 24–34)
MCHC RBC AUTO-ENTMCNC: 33.3 G/DL (ref 31–37)
MCV RBC AUTO: 89.8 FL (ref 74–97)
MONOCYTES # BLD: 0.4 K/UL (ref 0.05–1.2)
MONOCYTES NFR BLD: 4 % (ref 3–10)
NEUTS SEG # BLD: 9 K/UL (ref 1.8–8)
NEUTS SEG NFR BLD: 72 % (ref 40–73)
PLATELET # BLD AUTO: 244 K/UL (ref 135–420)
PMV BLD AUTO: 10.6 FL (ref 9.2–11.8)
POTASSIUM SERPL-SCNC: 3.2 MMOL/L (ref 3.5–5.5)
PROT SERPL-MCNC: 7.6 G/DL (ref 6.4–8.2)
RBC # BLD AUTO: 4.72 M/UL (ref 4.2–5.3)
SODIUM SERPL-SCNC: 143 MMOL/L (ref 136–145)
WBC # BLD AUTO: 12.5 K/UL (ref 4.6–13.2)

## 2019-10-13 PROCEDURE — 77030036554: Performed by: SURGERY

## 2019-10-13 PROCEDURE — 85025 COMPLETE CBC W/AUTO DIFF WBC: CPT

## 2019-10-13 PROCEDURE — 74011250636 HC RX REV CODE- 250/636

## 2019-10-13 PROCEDURE — 88302 TISSUE EXAM BY PATHOLOGIST: CPT

## 2019-10-13 PROCEDURE — 74011000250 HC RX REV CODE- 250: Performed by: ANESTHESIOLOGY

## 2019-10-13 PROCEDURE — 74011250636 HC RX REV CODE- 250/636: Performed by: SURGERY

## 2019-10-13 PROCEDURE — 77030040361 HC SLV COMPR DVT MDII -B: Performed by: SURGERY

## 2019-10-13 PROCEDURE — 88304 TISSUE EXAM BY PATHOLOGIST: CPT

## 2019-10-13 PROCEDURE — 74011250636 HC RX REV CODE- 250/636: Performed by: NURSE ANESTHETIST, CERTIFIED REGISTERED

## 2019-10-13 PROCEDURE — 93005 ELECTROCARDIOGRAM TRACING: CPT

## 2019-10-13 PROCEDURE — 76010000149 HC OR TIME 1 TO 1.5 HR: Performed by: SURGERY

## 2019-10-13 PROCEDURE — 77030008462 HC STPLR SKN PROX J&J -A: Performed by: SURGERY

## 2019-10-13 PROCEDURE — 74011000250 HC RX REV CODE- 250

## 2019-10-13 PROCEDURE — 77030018673: Performed by: SURGERY

## 2019-10-13 PROCEDURE — 77030019908 HC STETH ESOPH SIMS -A: Performed by: ANESTHESIOLOGY

## 2019-10-13 PROCEDURE — 77030018836 HC SOL IRR NACL ICUM -A: Performed by: SURGERY

## 2019-10-13 PROCEDURE — 99284 EMERGENCY DEPT VISIT MOD MDM: CPT

## 2019-10-13 PROCEDURE — 77030003029 HC SUT VCRL J&J -B: Performed by: SURGERY

## 2019-10-13 PROCEDURE — 77030013079 HC BLNKT BAIR HGGR 3M -A: Performed by: ANESTHESIOLOGY

## 2019-10-13 PROCEDURE — 77030026438 HC STYL ET INTUB CARD -A: Performed by: ANESTHESIOLOGY

## 2019-10-13 PROCEDURE — 77030008684 HC TU ET CUF COVD -B: Performed by: ANESTHESIOLOGY

## 2019-10-13 PROCEDURE — 74011250636 HC RX REV CODE- 250/636: Performed by: ANESTHESIOLOGY

## 2019-10-13 PROCEDURE — 77030034850: Performed by: SURGERY

## 2019-10-13 PROCEDURE — 77030020061 HC IV BLD WRMR ADMIN SET 3M -B: Performed by: ANESTHESIOLOGY

## 2019-10-13 PROCEDURE — 77030002933 HC SUT MCRYL J&J -A: Performed by: SURGERY

## 2019-10-13 PROCEDURE — 65270000029 HC RM PRIVATE

## 2019-10-13 PROCEDURE — 74011000258 HC RX REV CODE- 258: Performed by: SURGERY

## 2019-10-13 PROCEDURE — 96374 THER/PROPH/DIAG INJ IV PUSH: CPT

## 2019-10-13 PROCEDURE — 76060000033 HC ANESTHESIA 1 TO 1.5 HR: Performed by: SURGERY

## 2019-10-13 PROCEDURE — 80048 BASIC METABOLIC PNL TOTAL CA: CPT

## 2019-10-13 PROCEDURE — 76210000016 HC OR PH I REC 1 TO 1.5 HR: Performed by: SURGERY

## 2019-10-13 PROCEDURE — 77030031139 HC SUT VCRL2 J&J -A: Performed by: SURGERY

## 2019-10-13 PROCEDURE — 80076 HEPATIC FUNCTION PANEL: CPT

## 2019-10-13 PROCEDURE — 74011000250 HC RX REV CODE- 250: Performed by: SURGERY

## 2019-10-13 PROCEDURE — 77030012422 HC DRN WND COVD -A: Performed by: SURGERY

## 2019-10-13 PROCEDURE — 77030040922 HC BLNKT HYPOTHRM STRY -A: Performed by: SURGERY

## 2019-10-13 PROCEDURE — 74011250636 HC RX REV CODE- 250/636: Performed by: EMERGENCY MEDICINE

## 2019-10-13 PROCEDURE — 77030002966 HC SUT PDS J&J -A: Performed by: SURGERY

## 2019-10-13 PROCEDURE — 77030002986 HC SUT PROL J&J -A: Performed by: SURGERY

## 2019-10-13 PROCEDURE — 77030008771 HC TU NG SALEM SUMP -A: Performed by: ANESTHESIOLOGY

## 2019-10-13 PROCEDURE — 74011250637 HC RX REV CODE- 250/637

## 2019-10-13 PROCEDURE — 0WQF0ZZ REPAIR ABDOMINAL WALL, OPEN APPROACH: ICD-10-PCS | Performed by: SURGERY

## 2019-10-13 RX ORDER — ONDANSETRON 2 MG/ML
INJECTION INTRAMUSCULAR; INTRAVENOUS AS NEEDED
Status: DISCONTINUED | OUTPATIENT
Start: 2019-10-13 | End: 2019-10-13 | Stop reason: HOSPADM

## 2019-10-13 RX ORDER — FENTANYL CITRATE 50 UG/ML
50 INJECTION, SOLUTION INTRAMUSCULAR; INTRAVENOUS AS NEEDED
Status: DISCONTINUED | OUTPATIENT
Start: 2019-10-13 | End: 2019-10-14 | Stop reason: HOSPADM

## 2019-10-13 RX ORDER — FENTANYL CITRATE 50 UG/ML
INJECTION, SOLUTION INTRAMUSCULAR; INTRAVENOUS
Status: COMPLETED
Start: 2019-10-13 | End: 2019-10-13

## 2019-10-13 RX ORDER — SODIUM CHLORIDE 0.9 % (FLUSH) 0.9 %
5-40 SYRINGE (ML) INJECTION EVERY 8 HOURS
Status: DISCONTINUED | OUTPATIENT
Start: 2019-10-14 | End: 2019-10-14 | Stop reason: HOSPADM

## 2019-10-13 RX ORDER — ONDANSETRON 2 MG/ML
4 INJECTION INTRAMUSCULAR; INTRAVENOUS
Status: DISCONTINUED | OUTPATIENT
Start: 2019-10-13 | End: 2019-10-17 | Stop reason: HOSPADM

## 2019-10-13 RX ORDER — KETAMINE HYDROCHLORIDE 50 MG/ML
INJECTION, SOLUTION INTRAMUSCULAR; INTRAVENOUS AS NEEDED
Status: DISCONTINUED | OUTPATIENT
Start: 2019-10-13 | End: 2019-10-13 | Stop reason: HOSPADM

## 2019-10-13 RX ORDER — DEXAMETHASONE SODIUM PHOSPHATE 4 MG/ML
INJECTION, SOLUTION INTRA-ARTICULAR; INTRALESIONAL; INTRAMUSCULAR; INTRAVENOUS; SOFT TISSUE AS NEEDED
Status: DISCONTINUED | OUTPATIENT
Start: 2019-10-13 | End: 2019-10-13 | Stop reason: HOSPADM

## 2019-10-13 RX ORDER — MORPHINE SULFATE 2 MG/ML
8 INJECTION, SOLUTION INTRAMUSCULAR; INTRAVENOUS ONCE
Status: DISCONTINUED | OUTPATIENT
Start: 2019-10-13 | End: 2019-10-13 | Stop reason: SDUPTHER

## 2019-10-13 RX ORDER — ALBUTEROL SULFATE 90 UG/1
AEROSOL, METERED RESPIRATORY (INHALATION) AS NEEDED
Status: DISCONTINUED | OUTPATIENT
Start: 2019-10-13 | End: 2019-10-13 | Stop reason: HOSPADM

## 2019-10-13 RX ORDER — FENTANYL CITRATE 50 UG/ML
50 INJECTION, SOLUTION INTRAMUSCULAR; INTRAVENOUS
Status: COMPLETED | OUTPATIENT
Start: 2019-10-13 | End: 2019-10-13

## 2019-10-13 RX ORDER — SODIUM CHLORIDE 0.9 % (FLUSH) 0.9 %
5-40 SYRINGE (ML) INJECTION AS NEEDED
Status: DISCONTINUED | OUTPATIENT
Start: 2019-10-13 | End: 2019-10-17 | Stop reason: HOSPADM

## 2019-10-13 RX ORDER — METOPROLOL TARTRATE 5 MG/5ML
1.25 INJECTION INTRAVENOUS EVERY 4 HOURS
Status: DISCONTINUED | OUTPATIENT
Start: 2019-10-14 | End: 2019-10-15

## 2019-10-13 RX ORDER — SODIUM CHLORIDE, SODIUM LACTATE, POTASSIUM CHLORIDE, CALCIUM CHLORIDE 600; 310; 30; 20 MG/100ML; MG/100ML; MG/100ML; MG/100ML
75 INJECTION, SOLUTION INTRAVENOUS CONTINUOUS
Status: DISCONTINUED | OUTPATIENT
Start: 2019-10-13 | End: 2019-10-17 | Stop reason: HOSPADM

## 2019-10-13 RX ORDER — KETOROLAC TROMETHAMINE 15 MG/ML
15 INJECTION, SOLUTION INTRAMUSCULAR; INTRAVENOUS EVERY 8 HOURS
Status: DISCONTINUED | OUTPATIENT
Start: 2019-10-13 | End: 2019-10-17 | Stop reason: HOSPADM

## 2019-10-13 RX ORDER — DIPHENHYDRAMINE HYDROCHLORIDE 50 MG/ML
12.5 INJECTION, SOLUTION INTRAMUSCULAR; INTRAVENOUS
Status: DISCONTINUED | OUTPATIENT
Start: 2019-10-13 | End: 2019-10-17 | Stop reason: HOSPADM

## 2019-10-13 RX ORDER — DIPHENHYDRAMINE HYDROCHLORIDE 50 MG/ML
12.5 INJECTION, SOLUTION INTRAMUSCULAR; INTRAVENOUS
Status: DISCONTINUED | OUTPATIENT
Start: 2019-10-13 | End: 2019-10-14 | Stop reason: HOSPADM

## 2019-10-13 RX ORDER — SODIUM CHLORIDE 9 MG/ML
100 INJECTION, SOLUTION INTRAVENOUS CONTINUOUS
Status: DISCONTINUED | OUTPATIENT
Start: 2019-10-13 | End: 2019-10-13 | Stop reason: HOSPADM

## 2019-10-13 RX ORDER — SODIUM CHLORIDE, SODIUM LACTATE, POTASSIUM CHLORIDE, CALCIUM CHLORIDE 600; 310; 30; 20 MG/100ML; MG/100ML; MG/100ML; MG/100ML
INJECTION, SOLUTION INTRAVENOUS
Status: DISCONTINUED | OUTPATIENT
Start: 2019-10-13 | End: 2019-10-13 | Stop reason: HOSPADM

## 2019-10-13 RX ORDER — LORAZEPAM 2 MG/ML
1 INJECTION INTRAMUSCULAR
Status: DISCONTINUED | OUTPATIENT
Start: 2019-10-13 | End: 2019-10-17 | Stop reason: HOSPADM

## 2019-10-13 RX ORDER — METOPROLOL TARTRATE 5 MG/5ML
INJECTION INTRAVENOUS AS NEEDED
Status: DISCONTINUED | OUTPATIENT
Start: 2019-10-13 | End: 2019-10-13 | Stop reason: HOSPADM

## 2019-10-13 RX ORDER — SODIUM CHLORIDE, SODIUM LACTATE, POTASSIUM CHLORIDE, CALCIUM CHLORIDE 600; 310; 30; 20 MG/100ML; MG/100ML; MG/100ML; MG/100ML
50 INJECTION, SOLUTION INTRAVENOUS CONTINUOUS
Status: DISCONTINUED | OUTPATIENT
Start: 2019-10-14 | End: 2019-10-14 | Stop reason: HOSPADM

## 2019-10-13 RX ORDER — SODIUM CHLORIDE 0.9 % (FLUSH) 0.9 %
5-40 SYRINGE (ML) INJECTION EVERY 8 HOURS
Status: DISCONTINUED | OUTPATIENT
Start: 2019-10-13 | End: 2019-10-17 | Stop reason: HOSPADM

## 2019-10-13 RX ORDER — CLONIDINE 0.2 MG/24H
1 PATCH, EXTENDED RELEASE TRANSDERMAL
Status: DISCONTINUED | OUTPATIENT
Start: 2019-10-14 | End: 2019-10-15

## 2019-10-13 RX ORDER — HYDROMORPHONE HYDROCHLORIDE 1 MG/ML
0.5 INJECTION, SOLUTION INTRAMUSCULAR; INTRAVENOUS; SUBCUTANEOUS
Status: COMPLETED | OUTPATIENT
Start: 2019-10-13 | End: 2019-10-13

## 2019-10-13 RX ORDER — PROPOFOL 10 MG/ML
INJECTION, EMULSION INTRAVENOUS AS NEEDED
Status: DISCONTINUED | OUTPATIENT
Start: 2019-10-13 | End: 2019-10-13 | Stop reason: HOSPADM

## 2019-10-13 RX ORDER — HYDRALAZINE HYDROCHLORIDE 20 MG/ML
20 INJECTION INTRAMUSCULAR; INTRAVENOUS
Status: DISCONTINUED | OUTPATIENT
Start: 2019-10-13 | End: 2019-10-17 | Stop reason: HOSPADM

## 2019-10-13 RX ORDER — OXYCODONE AND ACETAMINOPHEN 5; 325 MG/1; MG/1
2 TABLET ORAL
Status: DISCONTINUED | OUTPATIENT
Start: 2019-10-13 | End: 2019-10-17 | Stop reason: HOSPADM

## 2019-10-13 RX ORDER — LABETALOL HCL 20 MG/4 ML
SYRINGE (ML) INTRAVENOUS AS NEEDED
Status: DISCONTINUED | OUTPATIENT
Start: 2019-10-13 | End: 2019-10-13 | Stop reason: HOSPADM

## 2019-10-13 RX ORDER — FAMOTIDINE 20 MG/50ML
20 INJECTION, SOLUTION INTRAVENOUS EVERY 12 HOURS
Status: DISCONTINUED | OUTPATIENT
Start: 2019-10-14 | End: 2019-10-13 | Stop reason: DRUGHIGH

## 2019-10-13 RX ORDER — LIDOCAINE HYDROCHLORIDE 20 MG/ML
INJECTION, SOLUTION EPIDURAL; INFILTRATION; INTRACAUDAL; PERINEURAL AS NEEDED
Status: DISCONTINUED | OUTPATIENT
Start: 2019-10-13 | End: 2019-10-13 | Stop reason: HOSPADM

## 2019-10-13 RX ORDER — ONDANSETRON 2 MG/ML
4 INJECTION INTRAMUSCULAR; INTRAVENOUS
Status: COMPLETED | OUTPATIENT
Start: 2019-10-13 | End: 2019-10-13

## 2019-10-13 RX ORDER — GLYCOPYRROLATE 0.2 MG/ML
INJECTION INTRAMUSCULAR; INTRAVENOUS AS NEEDED
Status: DISCONTINUED | OUTPATIENT
Start: 2019-10-13 | End: 2019-10-13 | Stop reason: HOSPADM

## 2019-10-13 RX ORDER — NALOXONE HYDROCHLORIDE 0.4 MG/ML
0.4 INJECTION, SOLUTION INTRAMUSCULAR; INTRAVENOUS; SUBCUTANEOUS AS NEEDED
Status: DISCONTINUED | OUTPATIENT
Start: 2019-10-13 | End: 2019-10-17 | Stop reason: HOSPADM

## 2019-10-13 RX ORDER — ROCURONIUM BROMIDE 10 MG/ML
INJECTION, SOLUTION INTRAVENOUS AS NEEDED
Status: DISCONTINUED | OUTPATIENT
Start: 2019-10-13 | End: 2019-10-13 | Stop reason: HOSPADM

## 2019-10-13 RX ORDER — BUPIVACAINE HYDROCHLORIDE 2.5 MG/ML
INJECTION, SOLUTION EPIDURAL; INFILTRATION; INTRACAUDAL AS NEEDED
Status: DISCONTINUED | OUTPATIENT
Start: 2019-10-13 | End: 2019-10-13 | Stop reason: HOSPADM

## 2019-10-13 RX ORDER — IPRATROPIUM BROMIDE AND ALBUTEROL SULFATE 2.5; .5 MG/3ML; MG/3ML
3 SOLUTION RESPIRATORY (INHALATION)
Status: DISCONTINUED | OUTPATIENT
Start: 2019-10-13 | End: 2019-10-17 | Stop reason: HOSPADM

## 2019-10-13 RX ORDER — MORPHINE SULFATE 4 MG/ML
INJECTION INTRAVENOUS AS NEEDED
Status: DISCONTINUED | OUTPATIENT
Start: 2019-10-13 | End: 2019-10-13 | Stop reason: HOSPADM

## 2019-10-13 RX ORDER — LABETALOL HCL 20 MG/4 ML
5-10 SYRINGE (ML) INTRAVENOUS
Status: DISCONTINUED | OUTPATIENT
Start: 2019-10-13 | End: 2019-10-17 | Stop reason: HOSPADM

## 2019-10-13 RX ORDER — NALOXONE HYDROCHLORIDE 0.4 MG/ML
0.1 INJECTION, SOLUTION INTRAMUSCULAR; INTRAVENOUS; SUBCUTANEOUS AS NEEDED
Status: DISCONTINUED | OUTPATIENT
Start: 2019-10-13 | End: 2019-10-14 | Stop reason: HOSPADM

## 2019-10-13 RX ORDER — MORPHINE SULFATE 10 MG/ML
INJECTION, SOLUTION INTRAMUSCULAR; INTRAVENOUS
Status: COMPLETED
Start: 2019-10-13 | End: 2019-10-13

## 2019-10-13 RX ORDER — BUDESONIDE 0.5 MG/2ML
500 INHALANT ORAL
Status: DISCONTINUED | OUTPATIENT
Start: 2019-10-14 | End: 2019-10-17 | Stop reason: HOSPADM

## 2019-10-13 RX ORDER — ARFORMOTEROL TARTRATE 15 UG/2ML
15 SOLUTION RESPIRATORY (INHALATION)
Status: DISCONTINUED | OUTPATIENT
Start: 2019-10-14 | End: 2019-10-17 | Stop reason: HOSPADM

## 2019-10-13 RX ORDER — FENTANYL CITRATE 50 UG/ML
INJECTION, SOLUTION INTRAMUSCULAR; INTRAVENOUS AS NEEDED
Status: DISCONTINUED | OUTPATIENT
Start: 2019-10-13 | End: 2019-10-13 | Stop reason: HOSPADM

## 2019-10-13 RX ORDER — NEOSTIGMINE METHYLSULFATE 1 MG/ML
INJECTION INTRAVENOUS AS NEEDED
Status: DISCONTINUED | OUTPATIENT
Start: 2019-10-13 | End: 2019-10-13 | Stop reason: HOSPADM

## 2019-10-13 RX ORDER — HYDROMORPHONE HYDROCHLORIDE 1 MG/ML
1 INJECTION, SOLUTION INTRAMUSCULAR; INTRAVENOUS; SUBCUTANEOUS
Status: DISCONTINUED | OUTPATIENT
Start: 2019-10-13 | End: 2019-10-17

## 2019-10-13 RX ORDER — SODIUM CHLORIDE 0.9 % (FLUSH) 0.9 %
5-40 SYRINGE (ML) INJECTION AS NEEDED
Status: DISCONTINUED | OUTPATIENT
Start: 2019-10-13 | End: 2019-10-14 | Stop reason: HOSPADM

## 2019-10-13 RX ORDER — SUCCINYLCHOLINE CHLORIDE 20 MG/ML
INJECTION INTRAMUSCULAR; INTRAVENOUS AS NEEDED
Status: DISCONTINUED | OUTPATIENT
Start: 2019-10-13 | End: 2019-10-13 | Stop reason: HOSPADM

## 2019-10-13 RX ORDER — HEPARIN SODIUM 5000 [USP'U]/ML
5000 INJECTION, SOLUTION INTRAVENOUS; SUBCUTANEOUS EVERY 8 HOURS
Status: DISCONTINUED | OUTPATIENT
Start: 2019-10-13 | End: 2019-10-17 | Stop reason: HOSPADM

## 2019-10-13 RX ORDER — MORPHINE SULFATE 10 MG/ML
8 INJECTION, SOLUTION INTRAMUSCULAR; INTRAVENOUS ONCE
Status: ACTIVE | OUTPATIENT
Start: 2019-10-13 | End: 2019-10-14

## 2019-10-13 RX ADMIN — PROPOFOL 50 MG: 10 INJECTION, EMULSION INTRAVENOUS at 21:47

## 2019-10-13 RX ADMIN — SODIUM CHLORIDE 100 ML/HR: 900 INJECTION, SOLUTION INTRAVENOUS at 21:19

## 2019-10-13 RX ADMIN — ALBUTEROL SULFATE 3 PUFF: 90 AEROSOL, METERED RESPIRATORY (INHALATION) at 22:56

## 2019-10-13 RX ADMIN — LABETALOL 20 MG/4 ML (5 MG/ML) INTRAVENOUS SYRINGE 5 MG: at 23:48

## 2019-10-13 RX ADMIN — METOPROLOL TARTRATE 5 MG: 5 INJECTION INTRAVENOUS at 21:47

## 2019-10-13 RX ADMIN — HYDROMORPHONE HYDROCHLORIDE 0.5 MG: 1 INJECTION, SOLUTION INTRAMUSCULAR; INTRAVENOUS; SUBCUTANEOUS at 23:30

## 2019-10-13 RX ADMIN — NEOSTIGMINE METHYLSULFATE 3 MG: 1 INJECTION INTRAVENOUS at 22:46

## 2019-10-13 RX ADMIN — DEXAMETHASONE SODIUM PHOSPHATE 8 MG: 4 INJECTION, SOLUTION INTRA-ARTICULAR; INTRALESIONAL; INTRAMUSCULAR; INTRAVENOUS; SOFT TISSUE at 21:55

## 2019-10-13 RX ADMIN — FENTANYL CITRATE 50 MCG: 50 INJECTION, SOLUTION INTRAMUSCULAR; INTRAVENOUS at 21:31

## 2019-10-13 RX ADMIN — HYDROMORPHONE HYDROCHLORIDE 0.5 MG: 1 INJECTION, SOLUTION INTRAMUSCULAR; INTRAVENOUS; SUBCUTANEOUS at 23:50

## 2019-10-13 RX ADMIN — KETOROLAC TROMETHAMINE 15 MG: 15 INJECTION, SOLUTION INTRAMUSCULAR; INTRAVENOUS at 23:22

## 2019-10-13 RX ADMIN — FENTANYL CITRATE 100 MCG: 50 INJECTION, SOLUTION INTRAMUSCULAR; INTRAVENOUS at 21:41

## 2019-10-13 RX ADMIN — Medication 5 MG: at 22:06

## 2019-10-13 RX ADMIN — GLYCOPYRROLATE 0.3 MG: 0.2 INJECTION INTRAMUSCULAR; INTRAVENOUS at 22:46

## 2019-10-13 RX ADMIN — SUCCINYLCHOLINE CHLORIDE 160 MG: 20 INJECTION INTRAMUSCULAR; INTRAVENOUS at 21:43

## 2019-10-13 RX ADMIN — Medication 5 MG: at 22:13

## 2019-10-13 RX ADMIN — ROCURONIUM BROMIDE 5 MG: 10 INJECTION, SOLUTION INTRAVENOUS at 21:43

## 2019-10-13 RX ADMIN — FENTANYL CITRATE 100 MCG: 50 INJECTION, SOLUTION INTRAMUSCULAR; INTRAVENOUS at 22:12

## 2019-10-13 RX ADMIN — METOPROLOL TARTRATE 5 MG: 5 INJECTION INTRAVENOUS at 22:15

## 2019-10-13 RX ADMIN — SODIUM CHLORIDE, SODIUM LACTATE, POTASSIUM CHLORIDE, CALCIUM CHLORIDE: 600; 310; 30; 20 INJECTION, SOLUTION INTRAVENOUS at 21:38

## 2019-10-13 RX ADMIN — HYDROMORPHONE HYDROCHLORIDE 0.5 MG: 1 INJECTION, SOLUTION INTRAMUSCULAR; INTRAVENOUS; SUBCUTANEOUS at 23:40

## 2019-10-13 RX ADMIN — MORPHINE SULFATE 8 MG: 10 INJECTION INTRAVENOUS at 19:29

## 2019-10-13 RX ADMIN — ROCURONIUM BROMIDE 25 MG: 10 INJECTION, SOLUTION INTRAVENOUS at 21:49

## 2019-10-13 RX ADMIN — MORPHINE SULFATE 4 MG: 4 INJECTION INTRAVENOUS at 23:06

## 2019-10-13 RX ADMIN — SODIUM CHLORIDE, SODIUM LACTATE, POTASSIUM CHLORIDE, AND CALCIUM CHLORIDE 50 ML/HR: 600; 310; 30; 20 INJECTION, SOLUTION INTRAVENOUS at 23:51

## 2019-10-13 RX ADMIN — HYDROMORPHONE HYDROCHLORIDE 0.5 MG: 1 INJECTION, SOLUTION INTRAMUSCULAR; INTRAVENOUS; SUBCUTANEOUS at 23:20

## 2019-10-13 RX ADMIN — FAMOTIDINE 20 MG: 10 INJECTION, SOLUTION INTRAVENOUS at 21:22

## 2019-10-13 RX ADMIN — FENTANYL CITRATE 50 MCG: 50 INJECTION, SOLUTION INTRAMUSCULAR; INTRAVENOUS at 21:16

## 2019-10-13 RX ADMIN — SODIUM CHLORIDE, SODIUM LACTATE, POTASSIUM CHLORIDE, CALCIUM CHLORIDE: 600; 310; 30; 20 INJECTION, SOLUTION INTRAVENOUS at 22:54

## 2019-10-13 RX ADMIN — ALBUTEROL SULFATE 3 PUFF: 90 AEROSOL, METERED RESPIRATORY (INHALATION) at 21:46

## 2019-10-13 RX ADMIN — ONDANSETRON 4 MG: 2 INJECTION INTRAMUSCULAR; INTRAVENOUS at 19:28

## 2019-10-13 RX ADMIN — ALBUTEROL SULFATE 3 PUFF: 90 AEROSOL, METERED RESPIRATORY (INHALATION) at 22:59

## 2019-10-13 RX ADMIN — ONDANSETRON 4 MG: 2 INJECTION INTRAMUSCULAR; INTRAVENOUS at 23:06

## 2019-10-13 RX ADMIN — LORAZEPAM 1 MG: 2 INJECTION, SOLUTION INTRAMUSCULAR; INTRAVENOUS at 23:35

## 2019-10-13 RX ADMIN — LIDOCAINE HYDROCHLORIDE 100 MG: 20 INJECTION, SOLUTION EPIDURAL; INFILTRATION; INTRACAUDAL; PERINEURAL at 21:43

## 2019-10-13 RX ADMIN — PROPOFOL 150 MG: 10 INJECTION, EMULSION INTRAVENOUS at 21:43

## 2019-10-13 RX ADMIN — ALBUTEROL SULFATE 3 PUFF: 90 AEROSOL, METERED RESPIRATORY (INHALATION) at 21:50

## 2019-10-13 RX ADMIN — KETAMINE HYDROCHLORIDE 50 MG: 50 INJECTION, SOLUTION INTRAMUSCULAR; INTRAVENOUS at 22:13

## 2019-10-13 RX ADMIN — PIPERACILLIN SODIUM AND TAZOBACTAM SODIUM 3.38 G: 3; .375 INJECTION, POWDER, LYOPHILIZED, FOR SOLUTION INTRAVENOUS at 21:55

## 2019-10-13 NOTE — ED PROVIDER NOTES
EMERGENCY DEPARTMENT HISTORY AND PHYSICAL EXAM    7:19 PM      Date: 10/13/2019  Patient Name: Bryn Jordan    History of Presenting Illness     Chief Complaint   Patient presents with    Abdominal Pain         History Provided By: Patient      Additional History (Context): Bryn Jordan is a 53 y.o. female with COPD, CHF, prior SBO who presents with abdominal pain. Started 2 hours ago when coughing, noticed a large lump pop out around her umbilicus. Severe pain, n/v since.  Never happened before    PCP: None    Current Facility-Administered Medications   Medication Dose Route Frequency Provider Last Rate Last Dose    sodium chloride 0.9 % bolus infusion 1,000 mL  1,000 mL IntraVENous ONCE Nick Zarate MD        morphine 10 mg/ml injection 8 mg  8 mg IntraVENous ONCE Tabatha Goodwin MD        sodium chloride (NS) flush 5-40 mL  5-40 mL IntraVENous Q8H Grant Gordon MD        sodium chloride (NS) flush 5-40 mL  5-40 mL IntraVENous PRN Grant Gordon MD        lactated Ringers infusion  125 mL/hr IntraVENous CONTINUOUS Grant Gordon MD        oxyCODONE-acetaminophen (PERCOCET) 5-325 mg per tablet 2 Tab  2 Tab Oral Q4H PRN Grant Gordon MD        HYDROmorphone (DILAUDID) injection 1 mg  1 mg IntraVENous Q2H PRN Grant Gordon MD        naloxone [facility]) injection 0.4 mg  0.4 mg IntraVENous PRN Grant Gordon MD        ondansetron [facility]) injection 4 mg  4 mg IntraVENous Q4H PRN Grant Gordon MD        diphenhydrAMINE (BENADRYL) injection 12.5 mg  12.5 mg IntraVENous Q4H PRN Grant Gordon MD        LORazepam (ATIVAN) injection 1 mg  1 mg IntraVENous Q6H PRN Grant Gordon MD        heparin (porcine) injection 5,000 Units  5,000 Units SubCUTAneous Q8H Grant Gordon MD        ketorolac (TORADOL) injection 15 mg  15 mg IntraVENous Q8H Grant Gordon MD   15 mg at 10/13/19 2982    [START ON 10/14/2019] lactated Ringers infusion  50 mL/hr IntraVENous CONTINUOUS Kina RENATO Chadwick        [START ON 10/14/2019] sodium chloride (NS) flush 5-40 mL  5-40 mL IntraVENous Q8H Kina Houston, CRNA        sodium chloride (NS) flush 5-40 mL  5-40 mL IntraVENous PRN Kina Houston, CRNA        fentaNYL citrate (PF) injection 50 mcg  50 mcg IntraVENous PRN Kina Farrukh, CRNA        naloxone [facility]) injection 0.1 mg  0.1 mg IntraVENous PRN Knia Houston, CRNA        diphenhydrAMINE (BENADRYL) injection 12.5 mg  12.5 mg IntraVENous Multiple Kina Houston, CRNA        HYDROmorphone (DILAUDID) injection 0.5 mg  0.5 mg IntraVENous Q10MIN PRN Kina Chadwick, CRNA   0.5 mg at 10/13/19 2320    labetalol (NORMODYNE;TRANDATE) 20 mg/4 mL (5 mg/mL) injection 5-10 mg  5-10 mg IntraVENous Q5MIN PRN Kinafelicia Chadwick CRNA        [START ON 10/14/2019] arformoterol (BROVANA) neb solution 15 mcg  15 mcg Nebulization BID RT Winston Fay MD        [START ON 10/14/2019] budesonide (PULMICORT) 500 mcg/2 ml nebulizer suspension  500 mcg Nebulization BID RT Winston Fay MD        . PHARMACY TO SUBSTITUTE PER PROTOCOL (Reordered from: [facility]) 4 mg/actuation nasal spray)    Per Protocol Winston Fay MD        [START ON 10/14/2019] cloNIDine (CATAPRES) 0.2 mg/24 hr patch 1 Patch  1 Patch TransDERmal Q7D Winston Fay MD        [START ON 10/14/2019] promethazine (PHENERGAN) 12.5 mg in 0.9% sodium chloride 50 mL IVPB  12.5 mg IntraVENous ONCE Kina RENATO Chadwick        hydrALAZINE (APRESOLINE) 20 mg/mL injection 20 mg  20 mg IntraVENous Q6H PRN Winston Fay MD        [START ON 10/14/2019] metoprolol (LOPRESSOR) injection 1.25 mg  1.25 mg IntraVENous Q4H Winston Fay MD        albuterol-ipratropium (DUO-NEB) 2.5 MG-0.5 MG/3 ML  3 mL Nebulization Q4H PRN Winston Fay MD        [START ON 10/14/2019] famotidine (PF) (PEPCID) 20 mg in sodium chloride 0.9% 10 mL injection  20 mg IntraVENous Q24H Daija Marks, Rubén Espinal MD           Past History     Past Medical History:  Past Medical History:   Diagnosis Date    Asthma     CHF (congestive heart failure) (HCC)     Chronic obstructive pulmonary disease (HCC)     Endocrine disease     thyroid issues    Gastrointestinal disorder     \"blockage in my stomach\"    Hypertension        Past Surgical History:  History reviewed. No pertinent surgical history. Family History:  History reviewed. No pertinent family history. Social History:  Social History     Tobacco Use    Smoking status: Current Every Day Smoker     Packs/day: 0.50    Smokeless tobacco: Former User   Substance Use Topics    Alcohol use: No     Comment: socially    Drug use: Not Currently     Types: Heroin       Allergies:  No Known Allergies      Review of Systems       Review of Systems   Constitutional: Negative. Negative for activity change, chills and fever. HENT: Negative. Negative for ear pain, hearing loss and sore throat. Eyes: Negative. Negative for pain and visual disturbance. Respiratory: Negative. Negative for cough, chest tightness and shortness of breath. Cardiovascular: Negative. Negative for chest pain and leg swelling. Gastrointestinal: Positive for abdominal pain, nausea and vomiting. Negative for abdominal distention. Genitourinary: Negative. Negative for dysuria and hematuria. Musculoskeletal: Negative. Skin: Negative. Negative for rash. Neurological: Negative. Negative for dizziness and headaches. Psychiatric/Behavioral: Negative. Negative for agitation and behavioral problems. Physical Exam     Visit Vitals  BP (!) 161/99   Pulse 84   Temp 98.4 °F (36.9 °C)   Resp 18   LMP 04/14/2009   SpO2 100%         Physical Exam   Constitutional: She is oriented to person, place, and time. She appears well-developed and well-nourished. HENT:   Head: Normocephalic and atraumatic. Eyes: Pupils are equal, round, and reactive to light.  EOM are normal. Right eye exhibits no discharge. Left eye exhibits no discharge. Neck: Normal range of motion. Neck supple. No JVD present. No tracheal deviation present. Cardiovascular: Normal rate, regular rhythm and normal heart sounds. No murmur heard. Pulmonary/Chest: Effort normal and breath sounds normal. No respiratory distress. She has no wheezes. She has no rales. Abdominal: Soft. Bowel sounds are normal. She exhibits no distension. There is no tenderness. There is no rebound. approx [id number] umbilical hernia unable to reduce, no skin changes but bruising to left lateral aspect, rest of abd soft nt/nd   Musculoskeletal: Normal range of motion. She exhibits no tenderness or deformity. Neurological: She is alert and oriented to person, place, and time. No cranial nerve deficit. 5/5 strength UE/LE, 5/5 sensation UE/LE     Skin: Skin is warm and dry. No rash noted. No erythema. Psychiatric: She has a normal mood and affect. Her behavior is normal.         Diagnostic Study Results     Labs -  Recent Results (from the past 12 hour(s))   CBC WITH AUTOMATED DIFF    Collection Time: 10/13/19  7:29 PM   Result Value Ref Range    WBC 12.5 4.6 - 13.2 K/uL    RBC 4.72 4.20 - 5.30 M/uL    HGB 14.1 12.0 - 16.0 g/dL    HCT 42.4 35.0 - 45.0 %    MCV 89.8 74.0 - 97.0 FL    MCH 29.9 24.0 - 34.0 PG    MCHC 33.3 31.0 - 37.0 g/dL    RDW 13.3 11.6 - 14.5 %    PLATELET 161 301 - 975 K/uL    MPV 10.6 9.2 - 11.8 FL    NEUTROPHILS 72 40 - 73 %    LYMPHOCYTES 23 21 - 52 %    MONOCYTES 4 3 - 10 %    EOSINOPHILS 1 0 - 5 %    BASOPHILS 0 0 - 2 %    ABS. NEUTROPHILS 9.0 (H) 1.8 - 8.0 K/UL    ABS. LYMPHOCYTES 2.9 0.9 - 3.6 K/UL    ABS. MONOCYTES 0.4 0.05 - 1.2 K/UL    ABS. EOSINOPHILS 0.1 0.0 - 0.4 K/UL    ABS.  BASOPHILS 0.0 0.0 - 0.1 K/UL    DF AUTOMATED     METABOLIC PANEL, BASIC    Collection Time: 10/13/19  7:29 PM   Result Value Ref Range    Sodium 143 136 - 145 mmol/L    Potassium 3.2 (L) 3.5 - 5.5 mmol/L    Chloride 109 100 - 111 mmol/L    CO2 26 21 - 32 mmol/L    Anion gap 8 3.0 - 18 mmol/L    Glucose 143 (H) 74 - 99 mg/dL    BUN 14 7.0 - 18 MG/DL    Creatinine 1.15 0.6 - 1.3 MG/DL    BUN/Creatinine ratio 12 12 - 20      GFR est AA 59 (L) >60 ml/min/1.73m2    GFR est non-AA 49 (L) >60 ml/min/1.73m2    Calcium 9.4 8.5 - 10.1 MG/DL   HEPATIC FUNCTION PANEL    Collection Time: 10/13/19  7:29 PM   Result Value Ref Range    Protein, total 7.6 6.4 - 8.2 g/dL    Albumin 4.0 3.4 - 5.0 g/dL    Globulin 3.6 2.0 - 4.0 g/dL    A-G Ratio 1.1 0.8 - 1.7      Bilirubin, total 0.4 0.2 - 1.0 MG/DL    Bilirubin, direct 0.1 0.0 - 0.2 MG/DL    Alk. phosphatase 99 45 - 117 U/L    AST (SGOT) 10 10 - 38 U/L    ALT (SGPT) 22 13 - 56 U/L       Radiologic Studies -   No orders to display         Medical Decision Making   I am the first provider for this patient. I reviewed the vital signs, available nursing notes, past medical history, past surgical history, family history and social history. Vital Signs-Reviewed the patient's vital signs. EKG: Interpreted by the EP. Time Interpreted: 2013   Rate: 91   Rhythm: Normal Sinus Rhythm    Interpretation: normal axis, normal intervals, lateral TW changes   Comparison: TW changes new    Records Reviewed: Nursing Notes and Old Medical Records      Provider Notes (Medical Decision Making):     MDM  Number of Diagnoses or Management Options  Incarcerated umbilical hernia:   Diagnosis management comments: Differential Diagnosis: incarcerated hernia, strangulated hernia, reducible hernia    Obvious incarcerated hernia, will get pain meds on board and attempt to reduce at bedside, if unable will call surgery emergently    Reevaluation: after high dose narcs and proper pain control, unable to reduce hernia despite significant time spent massaging. Surgery called and has seen, will be taking to Paul Taylor MD              For Hospitalized Patients:    1.  Critical Care Time: Critical Care Time:  The services I provided to this patient were to treat and/or prevent clinically significant deterioration that could result in the failure of one or more body systems and/or organ systems due to incarcerated hernia. Services included the following:  -reviewing nursing notes and old charts  -vital sign assessments  -direct patient care  -medication orders and management  -interpreting and reviewing diagnostic studies/labs  -re-evaluations  -documentation time    Aggregate critical care time was 35 minutes, which includes only time during which I was engaged in work directly related to the patient's care as described above, whether I was at bedside or elsewhere in the Emergency Department. It did not include time spent performing other reported procedures or the services of residents, students, nurses, or advance practice providers. Aminata Santos MD  11:16 PM         Diagnosis     Clinical Impression:   1. Incarcerated umbilical hernia        Disposition: OR    Follow-up Information    None          Current Discharge Medication List      CONTINUE these medications which have NOT CHANGED    Details   !! albuterol sulfate 90 mcg/actuation aepb Take 2 Puffs by inhalation every four (4) hours as needed (shortness of breath, wheezing, cough). Qty: 1 Inhaler, Refills: 0      doxycycline (VIBRA-TABS) 100 mg tablet Take 1 Tab by mouth two (2) times a day for 7 days. Qty: 14 Tab, Refills: 0      hydroCHLOROthiazide (HYDRODIURIL) 50 mg tablet Take 25 mg by mouth daily. QUEtiapine (SEROQUEL) 300 mg tablet Take 300 mg by mouth nightly. Indications: States she lost her daughter      !! albuterol sulfate 90 mcg/actuation aepb Take 2 Puffs by inhalation every four (4) hours as needed for Cough or Other (Wheezing). Qty: 1 Inhaler, Refills: 0      fluticasone furoate-vilanterol (BREO ELLIPTA) 200-25 mcg/dose inhaler Take 1 Puff by inhalation daily. lisinopril (PRINIVIL, ZESTRIL) 20 mg tablet Take 20 mg by mouth daily. amLODIPine (NORVASC) 10 mg tablet Take 1 Tab by mouth daily. Qty: 30 Tab, Refills: 0      butalbital-acetaminophen-caff (FIORICET) -40 mg per capsule Take 1 Cap by mouth every four (4) hours as needed for Pain. Qty: 10 Cap, Refills: 0      arformoterol (BROVANA) 15 mcg/2 mL nebu neb solution 2 mL by Nebulization route two (2) times a day. Qty: 60 Vial, Refills: 0      budesonide (PULMICORT) 0.5 mg/2 mL nbsp 2 mL by Nebulization route two (2) times a day. Qty: 60 Each, Refills: 0      tiotropium (SPIRIVA) 18 mcg inhalation capsule Take 1 Cap by inhalation daily. Qty: 30 Cap, Refills: 0      aspirin 81 mg chewable tablet Take 1 Tab by mouth daily. Qty: 30 Tab, Refills: 0      atorvastatin (LIPITOR) 20 mg tablet Take 1 Tab by mouth nightly. Qty: 30 Tab, Refills: 0      cloNIDine HCl (CATAPRES) 0.2 mg tablet Take 1 Tab by mouth two (2) times a day. Qty: 60 Tab, Refills: 0      docusate sodium (COLACE) 100 mg capsule Take 1 Cap by mouth two (2) times a day. Qty: 60 Cap, Refills: 0      escitalopram oxalate (LEXAPRO) 10 mg tablet Take 1 Tab by mouth every evening. Qty: 30 Tab, Refills: 0      metoprolol tartrate (LOPRESSOR) 25 mg tablet Take 0.5 Tabs by mouth every twelve (12) hours. Qty: 30 Tab, Refills: 0      roflumilast (DALIRESP) 500 mcg tab tablet Take 1 Tab by mouth daily. Qty: 30 Tab, Refills: 0      OTHER Incentive spirometry- use as directed  Qty: 1 Each, Refills: 0      naloxone (NARCAN) 4 mg/actuation nasal spray Use 1 spray intranasally, then discard. Repeat with new spray every 2 min as needed for opioid overdose symptoms, alternating nostrils. Qty: 1 Each, Refills: 0      budesonide-formoterol (SYMBICORT) 160-4.5 mcg/actuation HFAA Take 2 Puffs by inhalation two (2) times a day. Qty: 1 Inhaler, Refills: 1      ALPRAZolam (XANAX) 1 mg tablet Take 1 Tab by mouth two (2) times a day. Max Daily Amount: 2 mg.   Qty: 20 Tab, Refills: 0      famotidine (PEPCID) 20 mg tablet Take 1 Tab by mouth daily.  Qty: 30 Tab, Refills: 1      montelukast (SINGULAIR) 10 mg tablet Take 1 Tab by mouth nightly. Qty: 30 Tab, Refills: 1       !! - Potential duplicate medications found. Please discuss with provider.        _______________________________    Please note that this dictation was completed with [employer], the computer voice recognition software. Quite often unanticipated grammatical, syntax, homophones, and other interpretive errors are inadvertently transcribed by the computer software. Please disregard these errors. Please excuse any errors that have escaped final proofreading.

## 2019-10-13 NOTE — ED TRIAGE NOTES
Per EMS, Patient c/o abdominal pain around the umbilicus that started suddenly x1 hr ago. Patient has had vomiting and her BP is very high. 240/130.

## 2019-10-14 ENCOUNTER — APPOINTMENT (OUTPATIENT)
Dept: GENERAL RADIOLOGY | Age: 54
DRG: 227 | End: 2019-10-14
Attending: INTERNAL MEDICINE
Payer: MEDICAID

## 2019-10-14 LAB
ALBUMIN SERPL-MCNC: 3.2 G/DL (ref 3.4–5)
ALBUMIN/GLOB SERPL: 1 {RATIO} (ref 0.8–1.7)
ALP SERPL-CCNC: 85 U/L (ref 45–117)
ALT SERPL-CCNC: 17 U/L (ref 13–56)
ANION GAP SERPL CALC-SCNC: 8 MMOL/L (ref 3–18)
APPEARANCE UR: CLEAR
AST SERPL-CCNC: 9 U/L (ref 10–38)
ATRIAL RATE: 91 BPM
BACTERIA URNS QL MICRO: NEGATIVE /HPF
BASOPHILS # BLD: 0 K/UL (ref 0–0.06)
BASOPHILS NFR BLD: 0 % (ref 0–3)
BILIRUB DIRECT SERPL-MCNC: 0.1 MG/DL (ref 0–0.2)
BILIRUB SERPL-MCNC: 0.4 MG/DL (ref 0.2–1)
BILIRUB UR QL: NEGATIVE
BUN SERPL-MCNC: 14 MG/DL (ref 7–18)
BUN/CREAT SERPL: 12 (ref 12–20)
CALCIUM SERPL-MCNC: 8.3 MG/DL (ref 8.5–10.1)
CALCULATED P AXIS, ECG09: 79 DEGREES
CALCULATED R AXIS, ECG10: 54 DEGREES
CALCULATED T AXIS, ECG11: 85 DEGREES
CHLORIDE SERPL-SCNC: 111 MMOL/L (ref 100–111)
CO2 SERPL-SCNC: 23 MMOL/L (ref 21–32)
COLOR UR: YELLOW
CREAT SERPL-MCNC: 1.14 MG/DL (ref 0.6–1.3)
DIAGNOSIS, 93000: NORMAL
DIFFERENTIAL METHOD BLD: ABNORMAL
EOSINOPHIL # BLD: 0 K/UL (ref 0–0.4)
EOSINOPHIL NFR BLD: 0 % (ref 0–5)
EPITH CASTS URNS QL MICRO: NORMAL /LPF (ref 0–5)
ERYTHROCYTE [DISTWIDTH] IN BLOOD BY AUTOMATED COUNT: 13.4 % (ref 11.6–14.5)
GLOBULIN SER CALC-MCNC: 3.1 G/DL (ref 2–4)
GLUCOSE SERPL-MCNC: 147 MG/DL (ref 74–99)
GLUCOSE UR STRIP.AUTO-MCNC: 100 MG/DL
HCT VFR BLD AUTO: 37.8 % (ref 35–45)
HGB BLD-MCNC: 12.3 G/DL (ref 12–16)
HGB UR QL STRIP: NEGATIVE
KETONES UR QL STRIP.AUTO: 15 MG/DL
LEUKOCYTE ESTERASE UR QL STRIP.AUTO: NEGATIVE
LYMPHOCYTES # BLD: 0.3 K/UL (ref 0.8–3.5)
LYMPHOCYTES NFR BLD: 2 % (ref 20–51)
MAGNESIUM SERPL-MCNC: 1.7 MG/DL (ref 1.6–2.6)
MCH RBC QN AUTO: 29.4 PG (ref 24–34)
MCHC RBC AUTO-ENTMCNC: 32.5 G/DL (ref 31–37)
MCV RBC AUTO: 90.4 FL (ref 74–97)
MONOCYTES # BLD: 0.7 K/UL (ref 0–1)
MONOCYTES NFR BLD: 4 % (ref 2–9)
NEUTS BAND NFR BLD MANUAL: 3 % (ref 0–5)
NEUTS SEG # BLD: 16.1 K/UL (ref 1.8–8)
NEUTS SEG NFR BLD: 91 % (ref 42–75)
NITRITE UR QL STRIP.AUTO: NEGATIVE
P-R INTERVAL, ECG05: 134 MS
PH UR STRIP: 7.5 [PH] (ref 5–8)
PHOSPHATE SERPL-MCNC: 3.6 MG/DL (ref 2.5–4.9)
PLATELET # BLD AUTO: 226 K/UL (ref 135–420)
PLATELET COMMENTS,PCOM: ABNORMAL
PMV BLD AUTO: 10.5 FL (ref 9.2–11.8)
POTASSIUM SERPL-SCNC: 4.4 MMOL/L (ref 3.5–5.5)
PROT SERPL-MCNC: 6.3 G/DL (ref 6.4–8.2)
PROT UR STRIP-MCNC: ABNORMAL MG/DL
Q-T INTERVAL, ECG07: 386 MS
QRS DURATION, ECG06: 80 MS
QTC CALCULATION (BEZET), ECG08: 474 MS
RBC # BLD AUTO: 4.18 M/UL (ref 4.2–5.3)
RBC #/AREA URNS HPF: NORMAL /HPF (ref 0–5)
RBC MORPH BLD: ABNORMAL
SODIUM SERPL-SCNC: 142 MMOL/L (ref 136–145)
SP GR UR REFRACTOMETRY: 1.01 (ref 1–1.03)
UROBILINOGEN UR QL STRIP.AUTO: 0.2 EU/DL (ref 0.2–1)
VENTRICULAR RATE, ECG03: 91 BPM
WBC # BLD AUTO: 17.1 K/UL (ref 4.6–13.2)
WBC URNS QL MICRO: NORMAL /HPF (ref 0–4)

## 2019-10-14 PROCEDURE — 65270000029 HC RM PRIVATE

## 2019-10-14 PROCEDURE — 81001 URINALYSIS AUTO W/SCOPE: CPT

## 2019-10-14 PROCEDURE — 80076 HEPATIC FUNCTION PANEL: CPT

## 2019-10-14 PROCEDURE — 90471 IMMUNIZATION ADMIN: CPT

## 2019-10-14 PROCEDURE — 77010033678 HC OXYGEN DAILY

## 2019-10-14 PROCEDURE — 74011250636 HC RX REV CODE- 250/636: Performed by: SURGERY

## 2019-10-14 PROCEDURE — 83735 ASSAY OF MAGNESIUM: CPT

## 2019-10-14 PROCEDURE — 71045 X-RAY EXAM CHEST 1 VIEW: CPT

## 2019-10-14 PROCEDURE — 85025 COMPLETE CBC W/AUTO DIFF WBC: CPT

## 2019-10-14 PROCEDURE — 94640 AIRWAY INHALATION TREATMENT: CPT

## 2019-10-14 PROCEDURE — 94761 N-INVAS EAR/PLS OXIMETRY MLT: CPT

## 2019-10-14 PROCEDURE — 74011250637 HC RX REV CODE- 250/637: Performed by: INTERNAL MEDICINE

## 2019-10-14 PROCEDURE — 74011000250 HC RX REV CODE- 250: Performed by: INTERNAL MEDICINE

## 2019-10-14 PROCEDURE — 36415 COLL VENOUS BLD VENIPUNCTURE: CPT

## 2019-10-14 PROCEDURE — 74011250636 HC RX REV CODE- 250/636: Performed by: INTERNAL MEDICINE

## 2019-10-14 PROCEDURE — 90686 IIV4 VACC NO PRSV 0.5 ML IM: CPT | Performed by: INTERNAL MEDICINE

## 2019-10-14 PROCEDURE — 80048 BASIC METABOLIC PNL TOTAL CA: CPT

## 2019-10-14 PROCEDURE — 84100 ASSAY OF PHOSPHORUS: CPT

## 2019-10-14 PROCEDURE — 74011250637 HC RX REV CODE- 250/637: Performed by: SURGERY

## 2019-10-14 RX ADMIN — KETOROLAC TROMETHAMINE 15 MG: 15 INJECTION, SOLUTION INTRAMUSCULAR; INTRAVENOUS at 05:36

## 2019-10-14 RX ADMIN — METOPROLOL TARTRATE 1.25 MG: 5 INJECTION, SOLUTION INTRAVENOUS at 00:59

## 2019-10-14 RX ADMIN — HYDROMORPHONE HYDROCHLORIDE 1 MG: 1 INJECTION, SOLUTION INTRAMUSCULAR; INTRAVENOUS; SUBCUTANEOUS at 20:04

## 2019-10-14 RX ADMIN — HYDROMORPHONE HYDROCHLORIDE 1 MG: 1 INJECTION, SOLUTION INTRAMUSCULAR; INTRAVENOUS; SUBCUTANEOUS at 03:53

## 2019-10-14 RX ADMIN — INFLUENZA VIRUS VACCINE 0.5 ML: 15; 15; 15; 15 SUSPENSION INTRAMUSCULAR at 10:19

## 2019-10-14 RX ADMIN — KETOROLAC TROMETHAMINE 15 MG: 15 INJECTION, SOLUTION INTRAMUSCULAR; INTRAVENOUS at 14:24

## 2019-10-14 RX ADMIN — SODIUM CHLORIDE, SODIUM LACTATE, POTASSIUM CHLORIDE, AND CALCIUM CHLORIDE 125 ML/HR: 600; 310; 30; 20 INJECTION, SOLUTION INTRAVENOUS at 00:58

## 2019-10-14 RX ADMIN — FAMOTIDINE 20 MG: 10 INJECTION INTRAVENOUS at 14:24

## 2019-10-14 RX ADMIN — SODIUM CHLORIDE, SODIUM LACTATE, POTASSIUM CHLORIDE, AND CALCIUM CHLORIDE 125 ML/HR: 600; 310; 30; 20 INJECTION, SOLUTION INTRAVENOUS at 22:17

## 2019-10-14 RX ADMIN — HYDROMORPHONE HYDROCHLORIDE 1 MG: 1 INJECTION, SOLUTION INTRAMUSCULAR; INTRAVENOUS; SUBCUTANEOUS at 10:15

## 2019-10-14 RX ADMIN — HYDROMORPHONE HYDROCHLORIDE 1 MG: 1 INJECTION, SOLUTION INTRAMUSCULAR; INTRAVENOUS; SUBCUTANEOUS at 16:59

## 2019-10-14 RX ADMIN — ARFORMOTEROL TARTRATE 15 MCG: 15 SOLUTION RESPIRATORY (INHALATION) at 08:03

## 2019-10-14 RX ADMIN — Medication 10 ML: at 14:28

## 2019-10-14 RX ADMIN — METOPROLOL TARTRATE 1.25 MG: 5 INJECTION, SOLUTION INTRAVENOUS at 05:36

## 2019-10-14 RX ADMIN — BUDESONIDE 500 MCG: 0.5 INHALANT RESPIRATORY (INHALATION) at 19:59

## 2019-10-14 RX ADMIN — HYDROMORPHONE HYDROCHLORIDE 1 MG: 1 INJECTION, SOLUTION INTRAMUSCULAR; INTRAVENOUS; SUBCUTANEOUS at 05:56

## 2019-10-14 RX ADMIN — METOPROLOL TARTRATE 1.25 MG: 5 INJECTION, SOLUTION INTRAVENOUS at 08:24

## 2019-10-14 RX ADMIN — METOPROLOL TARTRATE 1.25 MG: 5 INJECTION, SOLUTION INTRAVENOUS at 12:35

## 2019-10-14 RX ADMIN — HEPARIN SODIUM 5000 UNITS: 5000 INJECTION INTRAVENOUS; SUBCUTANEOUS at 12:36

## 2019-10-14 RX ADMIN — Medication 10 ML: at 05:37

## 2019-10-14 RX ADMIN — OXYCODONE HYDROCHLORIDE AND ACETAMINOPHEN 2 TABLET: 5; 325 TABLET ORAL at 14:25

## 2019-10-14 RX ADMIN — OXYCODONE HYDROCHLORIDE AND ACETAMINOPHEN 2 TABLET: 5; 325 TABLET ORAL at 18:16

## 2019-10-14 RX ADMIN — HYDROMORPHONE HYDROCHLORIDE 1 MG: 1 INJECTION, SOLUTION INTRAMUSCULAR; INTRAVENOUS; SUBCUTANEOUS at 12:35

## 2019-10-14 RX ADMIN — METOPROLOL TARTRATE 1.25 MG: 5 INJECTION, SOLUTION INTRAVENOUS at 20:13

## 2019-10-14 RX ADMIN — HYDROMORPHONE HYDROCHLORIDE 1 MG: 1 INJECTION, SOLUTION INTRAMUSCULAR; INTRAVENOUS; SUBCUTANEOUS at 08:21

## 2019-10-14 RX ADMIN — METOPROLOL TARTRATE 1.25 MG: 5 INJECTION, SOLUTION INTRAVENOUS at 16:59

## 2019-10-14 RX ADMIN — Medication 10 ML: at 22:09

## 2019-10-14 RX ADMIN — SODIUM CHLORIDE, SODIUM LACTATE, POTASSIUM CHLORIDE, AND CALCIUM CHLORIDE 125 ML/HR: 600; 310; 30; 20 INJECTION, SOLUTION INTRAVENOUS at 12:45

## 2019-10-14 RX ADMIN — Medication 10 ML: at 01:00

## 2019-10-14 RX ADMIN — ONDANSETRON 4 MG: 2 INJECTION INTRAMUSCULAR; INTRAVENOUS at 00:59

## 2019-10-14 RX ADMIN — FAMOTIDINE 20 MG: 10 INJECTION INTRAVENOUS at 22:08

## 2019-10-14 RX ADMIN — ARFORMOTEROL TARTRATE 15 MCG: 15 SOLUTION RESPIRATORY (INHALATION) at 19:59

## 2019-10-14 RX ADMIN — HEPARIN SODIUM 5000 UNITS: 5000 INJECTION INTRAVENOUS; SUBCUTANEOUS at 03:45

## 2019-10-14 RX ADMIN — BUDESONIDE 500 MCG: 0.5 INHALANT RESPIRATORY (INHALATION) at 08:03

## 2019-10-14 RX ADMIN — OXYCODONE HYDROCHLORIDE AND ACETAMINOPHEN 2 TABLET: 5; 325 TABLET ORAL at 22:08

## 2019-10-14 RX ADMIN — KETOROLAC TROMETHAMINE 15 MG: 15 INJECTION, SOLUTION INTRAMUSCULAR; INTRAVENOUS at 22:08

## 2019-10-14 NOTE — ROUTINE PROCESS
TRANSFER - OUT REPORT: 
 
Verbal report given to Phil Kerr RN on Amandrew Daubs  being transferred to OR for ordered procedure Report consisted of patients Situation, Background, Assessment and  
Recommendations(SBAR). Information from the following report(s) SBAR was reviewed with the receiving nurse. Lines:    
 
Opportunity for questions and clarification was provided. Patient transported with: 
 Registered Nurse

## 2019-10-14 NOTE — ROUTINE PROCESS
Bedside and Verbal shift change report given to Bassem Villalba (oncoming nurse) by Jassi Babb RN 
(offgoing nurse).  Report included the following information Kardex, Intake/Output, MAR, Recent Results and Cardiac Rhythm SR.

## 2019-10-14 NOTE — ROUTINE PROCESS
TRANSFER - IN REPORT: 
 
Verbal report received from Andrew Rubio RN (name) on Erica Gan  being received from PACU (unit) for routine progression of care Report consisted of patients Situation, Background, Assessment and  
Recommendations(SBAR). Information from the following report(s) Procedure Summary, MAR and Cardiac Rhythm SR was reviewed with the receiving nurse. Opportunity for questions and clarification was provided. Assessment completed upon patients arrival to unit and care assumed.

## 2019-10-14 NOTE — CONSULTS
Consult  PCP:  None    Reason for consult:  HTN and COPD management    Subjective:     Sharri Sanabria is a 53 y.o.  female who is being seen for post operative COPD and HTN management. Patient presented to ED with complaint of abdominal pain and was diagnosed with incarcerated incisional hernia. She underwent Exploratory laparotomy with lysis of adhesions and primary repair of incarcerated umbilical hernia. I saw this patient in PACU. She is asking for pain medications. She states she takes Lisinopril/HCTZ/Metoprolol/Norvasc and clonidine for HTN. Abdominal pain present. No chest pain. SOB is okay. No soreness of throat. No obvious nausea or cough. Denies for smoking cigarettes and drinking alcohol. only limited history can be obtained as patient is continuously asking for pain medications. Past Medical History:   Diagnosis Date    Asthma     CHF (congestive heart failure) (HCC)     Chronic obstructive pulmonary disease (HCC)     Endocrine disease     thyroid issues    Gastrointestinal disorder     \"blockage in my stomach\"    Hypertension       History reviewed. No pertinent surgical history. History reviewed. No pertinent family history. Social History     Tobacco Use    Smoking status: Current Every Day Smoker     Packs/day: 0.50    Smokeless tobacco: Former User   Substance Use Topics    Alcohol use: No     Comment: socially      Prior to Admission Medications   Prescriptions Last Dose Informant Patient Reported? Taking? ALPRAZolam (XANAX) 1 mg tablet 10/12/2019  No Yes   Sig: Take 1 Tab by mouth two (2) times a day. Max Daily Amount: 2 mg. OTHER 10/12/2019  No Yes   Sig: Incentive spirometry- use as directed   QUEtiapine (SEROQUEL) 300 mg tablet 10/12/2019  Yes Yes   Sig: Take 300 mg by mouth nightly.  Indications: States she lost her daughter   albuterol sulfate 90 mcg/actuation aepb 10/12/2019  No Yes   Sig: Take 2 Puffs by inhalation every four (4) hours as needed for Cough or Other (Wheezing). albuterol sulfate 90 mcg/actuation aepb 10/12/2019  No Yes   Sig: Take 2 Puffs by inhalation every four (4) hours as needed (shortness of breath, wheezing, cough). amLODIPine (NORVASC) 10 mg tablet 10/12/2019  No Yes   Sig: Take 1 Tab by mouth daily. arformoterol (BROVANA) 15 mcg/2 mL nebu neb solution 10/12/2019  No Yes   Si mL by Nebulization route two (2) times a day. aspirin 81 mg chewable tablet 10/12/2019  No Yes   Sig: Take 1 Tab by mouth daily. atorvastatin (LIPITOR) 20 mg tablet 10/12/2019  No Yes   Sig: Take 1 Tab by mouth nightly. budesonide (PULMICORT) 0.5 mg/2 mL nbsp 10/12/2019  No Yes   Si mL by Nebulization route two (2) times a day. budesonide-formoterol (SYMBICORT) 160-4.5 mcg/actuation HFAA 10/12/2019  No Yes   Sig: Take 2 Puffs by inhalation two (2) times a day. butalbital-acetaminophen-caff (FIORICET) -40 mg per capsule 10/12/2019  No Yes   Sig: Take 1 Cap by mouth every four (4) hours as needed for Pain. cloNIDine HCl (CATAPRES) 0.2 mg tablet 10/12/2019  No Yes   Sig: Take 1 Tab by mouth two (2) times a day. docusate sodium (COLACE) 100 mg capsule 10/12/2019  No Yes   Sig: Take 1 Cap by mouth two (2) times a day. doxycycline (VIBRA-TABS) 100 mg tablet 10/12/2019  No Yes   Sig: Take 1 Tab by mouth two (2) times a day for 7 days. escitalopram oxalate (LEXAPRO) 10 mg tablet 10/12/2019  No Yes   Sig: Take 1 Tab by mouth every evening. famotidine (PEPCID) 20 mg tablet 10/12/2019  No Yes   Sig: Take 1 Tab by mouth daily. fluticasone furoate-vilanterol (BREO ELLIPTA) 200-25 mcg/dose inhaler 10/12/2019  Yes Yes   Sig: Take 1 Puff by inhalation daily. hydroCHLOROthiazide (HYDRODIURIL) 50 mg tablet 10/12/2019  Yes Yes   Sig: Take 25 mg by mouth daily. lisinopril (PRINIVIL, ZESTRIL) 20 mg tablet 10/12/2019  Yes Yes   Sig: Take 20 mg by mouth daily.    metoprolol tartrate (LOPRESSOR) 25 mg tablet 10/12/2019  No Yes   Sig: Take 0.5 Tabs by mouth every twelve (12) hours. montelukast (SINGULAIR) 10 mg tablet 10/12/2019  No Yes   Sig: Take 1 Tab by mouth nightly. naloxone [facility]) 4 mg/actuation nasal spray 10/12/2019  No Yes   Sig: Use 1 spray intranasally, then discard. Repeat with new spray every 2 min as needed for opioid overdose symptoms, alternating nostrils. roflumilast (DALIRESP) 500 mcg tab tablet 10/12/2019  No Yes   Sig: Take 1 Tab by mouth daily. tiotropium (SPIRIVA) 18 mcg inhalation capsule 10/12/2019  No Yes   Sig: Take 1 Cap by inhalation daily. Facility-Administered Medications: None       No Known Allergies     Review of Systems:  Pertinent items are noted in the History of Present Illness. Objective: Intake and Output:    10/13 1901 - 10/14 0700  In: 1000 [I.V.:1000]  Out: 25   No intake/output data recorded. Physical Exam:     BP (!) 161/99   Pulse 84   Temp 98.4 °F (36.9 °C)   Resp 18   LMP 04/14/2009   SpO2 100%     General appearance - awake, well appearing, and in no distress  Eyes - sclera anicteric, no pallor  Nose - no nasal discharge. Neck - supple, no JVD, trachea is midline  Chest - decreased air entry noted in bases, no wheezes  Heart - S1 and S2 normal  Abdomen - soft, Dressing in situ, non-distended.  Patient start screaming on touching to her abdomen  Neurological - alert, oriented, normal speech, no focal findings noted while in bed  Musculoskeletal - no joint tenderness or swelling of knees  Extremities - no pedal edema noted      Data Review:   Recent Results (from the past 24 hour(s))   CBC WITH AUTOMATED DIFF    Collection Time: 10/13/19  7:29 PM   Result Value Ref Range    WBC 12.5 4.6 - 13.2 K/uL    RBC 4.72 4.20 - 5.30 M/uL    HGB 14.1 12.0 - 16.0 g/dL    HCT 42.4 35.0 - 45.0 %    MCV 89.8 74.0 - 97.0 FL    MCH 29.9 24.0 - 34.0 PG    MCHC 33.3 31.0 - 37.0 g/dL    RDW 13.3 11.6 - 14.5 %    PLATELET 861 989 - 332 K/uL    MPV 10.6 9.2 - 11.8 FL    NEUTROPHILS 72 40 - 73 % LYMPHOCYTES 23 21 - 52 %    MONOCYTES 4 3 - 10 %    EOSINOPHILS 1 0 - 5 %    BASOPHILS 0 0 - 2 %    ABS. NEUTROPHILS 9.0 (H) 1.8 - 8.0 K/UL    ABS. LYMPHOCYTES 2.9 0.9 - 3.6 K/UL    ABS. MONOCYTES 0.4 0.05 - 1.2 K/UL    ABS. EOSINOPHILS 0.1 0.0 - 0.4 K/UL    ABS. BASOPHILS 0.0 0.0 - 0.1 K/UL    DF AUTOMATED     METABOLIC PANEL, BASIC    Collection Time: 10/13/19  7:29 PM   Result Value Ref Range    Sodium 143 136 - 145 mmol/L    Potassium 3.2 (L) 3.5 - 5.5 mmol/L    Chloride 109 100 - 111 mmol/L    CO2 26 21 - 32 mmol/L    Anion gap 8 3.0 - 18 mmol/L    Glucose 143 (H) 74 - 99 mg/dL    BUN 14 7.0 - 18 MG/DL    Creatinine 1.15 0.6 - 1.3 MG/DL    BUN/Creatinine ratio 12 12 - 20      GFR est AA 59 (L) >60 ml/min/1.73m2    GFR est non-AA 49 (L) >60 ml/min/1.73m2    Calcium 9.4 8.5 - 10.1 MG/DL   HEPATIC FUNCTION PANEL    Collection Time: 10/13/19  7:29 PM   Result Value Ref Range    Protein, total 7.6 6.4 - 8.2 g/dL    Albumin 4.0 3.4 - 5.0 g/dL    Globulin 3.6 2.0 - 4.0 g/dL    A-G Ratio 1.1 0.8 - 1.7      Bilirubin, total 0.4 0.2 - 1.0 MG/DL    Bilirubin, direct 0.1 0.0 - 0.2 MG/DL    Alk. phosphatase 99 45 - 117 U/L    AST (SGOT) 10 10 - 38 U/L    ALT (SGPT) 22 13 - 56 U/L         Assessment:     Active Problems:    Incarcerated incisional hernia (10/13/2019)    1. S/p Exploratory laparotomy with lysis of adhesions and primary repair of incarcerated umbilical hernia  2. Abdominal pain  3. HTN  4. COPD without exacerbation  5. Chronic pain syndrome  6. Anxiety/depression     Plan:     Patient will need telemetry. We will place her on clonidine patch, IV lopressor and prn IV hydralazine  We will continue Brovana and Pulmicort nebulizer.    Pepcid will be added  IV ativan prn  Will start PO medications once cleared by primary team  Dr. Mata Ivy to follow for pain and diet management

## 2019-10-14 NOTE — PROGRESS NOTES
Problem: Surgical Pathway Day of Surgery  Goal: Off Pathway (Use only if patient is Off Pathway)  Outcome: Progressing Towards Goal

## 2019-10-14 NOTE — INTERDISCIPLINARY ROUNDS
Interdisciplinary Round Note Patient Information: 
 William Gentile [birth year] Reason for Admission: Incarcerated incisional hernia [K43.0] Attending Provider:   Leila Huffman MD 
Primary Care Physician: 
     None None Estimated discharge date:  unknown Hospital day: 1 
[de-identified] 2d 7h 
RRAT Score: Medium Risk 15 Total Score 3 Has Seen PCP in Last 6 Months (Yes=3, No=0)  
 4 IP Visits Last 12 Months (1-3=4, 4=9, >4=11) 4 Pt. Coverage (Medicare=5 , Medicaid, or Self-Pay=4) 2 Charlson Comorbidity Score (Age + Comorbid Conditions) Criteria that do not apply:  
 . Living with Significant Other. Assisted Living. LTAC. SNF. or  
Rehab Patient Length of Stay (>5 days = 3)  
   
normal sinus rhythm No 
n/a Other n/a Chemical    
 Lines, Drains, & Airways 
gtz IV Antibiotics:   
Current Antimicrobial Therapy (168h ago, onward) None GI Prophylaxis: GI Prophylaxis: no 
 Type: chemical      Recent Glucose Results:  
Lab Results Component Value Date/Time  (H) 10/14/2019 07:54 AM  
  
Activity Level: Activity Level: Bed Rest 
 
Needs assistance with ADLs: no 
 
 
 Goals for Today: Pain management Recommendations:  
Discharge Disposition: SNF and Home with home health PT 
P.T and O.T. 
Care Management involvement for home health follow up for:  DME, mobility and assistance with ADL's Needs for Discharge: ADL assistance, aide IDR Team: Jeanett Gottron manager,MD 
Recommendations from IDR team: pain control, Discharge to home with [facility] aide Other Notes: n/a

## 2019-10-14 NOTE — H&P
[facility]  General Surgery    Subjective:      HPI:  Pt is a very pleasant morbidly obese 52 yo female with a PMHx of htn, hypertensive heart failure, history of hypertensive urgency, COPD, tobacco abuse, CAP and h/o respiratory failure. I was asked to see the patient by the Dr. Sang Ruff for evaluation and management of an incarcerated incisional hernia at the umbilicus. The patient estimates that the hernia has been incarcerated for approximately 3 hours. She is 10/10 pain. She endorses nausea/vomiting and flatus.      Patient Active Problem List    Diagnosis Date Noted    Incarcerated incisional hernia 10/13/2019    COPD (chronic obstructive pulmonary disease) ([address] 09/28/2019    CAP (community acquired pneumonia) 09/28/2019    Hypertensive urgency 09/28/2019    Tobacco use 09/28/2019    Acute bronchitis with chronic obstructive pulmonary disease (COPD) ([address] 03/09/2019    T wave inversion in EKG 03/09/2019    Hypertensive heart failure ([address] 12/19/2017    Sleep apnea 12/19/2017    COPD (chronic obstructive pulmonary disease) with chronic bronchitis ([address] 12/19/2017    Acute asthma exacerbation 04/21/2017    Essential hypertension 03/10/2017    Morbid obesity due to excess calories ([address] 03/10/2017    Chest pain on breathing 03/10/2017    COPD with acute exacerbation ([address] 03/02/2017    Acute respiratory failure with hypercapnia ([address] 02/26/2017    Asthma exacerbation 01/26/2017    Respiratory failure ([address] 01/11/2017    Acute encephalopathy 01/11/2017    Acute exacerbation of chronic obstructive pulmonary disease (COPD) ([address] 08/29/2016    Acute on chronic respiratory failure with hypoxemia ([address] 03/12/2016    Asthma 03/05/2016    Abnormal CT of the chest 11/20/2015    Asthma exacerbation attacks 10/19/2015    Status asthmaticus with COPD (chronic obstructive pulmonary disease) ([address] 10/19/2015     Past Medical History:   Diagnosis Date    Asthma     CHF (congestive heart failure) (HCC)     Chronic obstructive pulmonary disease (HCC)     Endocrine disease     thyroid issues    Gastrointestinal disorder     \"blockage in my stomach\"    Hypertension       History reviewed. No pertinent surgical history. History reviewed. No pertinent family history. Social History     Tobacco Use    Smoking status: Current Every Day Smoker     Packs/day: 0.50    Smokeless tobacco: Former User   Substance Use Topics    Alcohol use: No     Comment: socially      No Known Allergies    Prior to Admission medications    Medication Sig Start Date End Date Taking? Authorizing Provider   albuterol sulfate 90 mcg/actuation aepb Take 2 Puffs by inhalation every four (4) hours as needed (shortness of breath, wheezing, cough). 10/6/19   Emilee Sutton DO   doxycycline (VIBRA-TABS) 100 mg tablet Take 1 Tab by mouth two (2) times a day for 7 days. 10/6/19 10/13/19  Emilee Sutton DO   hydroCHLOROthiazide (HYDRODIURIL) 50 mg tablet Take 25 mg by mouth daily. Provider, Historical   QUEtiapine (SEROQUEL) 300 mg tablet Take 300 mg by mouth nightly. Indications: States she lost her daughter    Provider, Historical   albuterol sulfate 90 mcg/actuation aepb Take 2 Puffs by inhalation every four (4) hours as needed for Cough or Other (Wheezing). 7/25/19   Frederic Bernstein MD   fluticasone furoate-vilanterol (BREO ELLIPTA) 200-25 mcg/dose inhaler Take 1 Puff by inhalation daily. Shalini Knox MD   lisinopril (PRINIVIL, ZESTRIL) 20 mg tablet Take 20 mg by mouth daily. Other, MD Shalini   amLODIPine (NORVASC) 10 mg tablet Take 1 Tab by mouth daily. 7/5/19   Sera Thomas MD   butalbital-acetaminophen-caff (FIORICET) -40 mg per capsule Take 1 Cap by mouth every four (4) hours as needed for Pain. 6/30/19   Rolly Ruiz PA-C   arformoterol (BROVANA) 15 mcg/2 mL nebu neb solution 2 mL by Nebulization route two (2) times a day.  3/14/19   Isabelle Torrez MD   budesonide (PULMICORT) 0.5 mg/2 mL nbsp 2 mL by Nebulization route two (2) times a day. 3/14/19   Mirella Ridley MD   tiotropium (SPIRIVA) 18 mcg inhalation capsule Take 1 Cap by inhalation daily. 3/14/19   Mirella Ridley MD   aspirin 81 mg chewable tablet Take 1 Tab by mouth daily. 3/15/19   Mirella Ridley MD   atorvastatin (LIPITOR) 20 mg tablet Take 1 Tab by mouth nightly. 3/14/19   Mirella Ridley MD   cloNIDine HCl (CATAPRES) 0.2 mg tablet Take 1 Tab by mouth two (2) times a day. 3/14/19   Mirella Ridley MD   docusate sodium (COLACE) 100 mg capsule Take 1 Cap by mouth two (2) times a day. 3/14/19   Mirella Ridley MD   escitalopram oxalate (LEXAPRO) 10 mg tablet Take 1 Tab by mouth every evening. 3/14/19   Mriella Ridley MD   metoprolol tartrate (LOPRESSOR) 25 mg tablet Take 0.5 Tabs by mouth every twelve (12) hours. 3/14/19   Mirella Ridley MD   roflumilast (DALIRESP) 500 mcg tab tablet Take 1 Tab by mouth daily. 3/15/19   Mirella Ridley MD   OTHER Check CBC, CMP, Mg in 4 days, results to PCP immediately, Diagnosis- asthma 3/14/19   Mirella Ridley MD   OTHER Incentive spirometry- use as directed 3/14/19   Mirella Ridley MD   naloxone [facility]) 4 mg/actuation nasal spray Use 1 spray intranasally, then discard. Repeat with new spray every 2 min as needed for opioid overdose symptoms, alternating nostrils. 3/14/19   Mirella Ridley MD   budesonide-formoterol (SYMBICORT) 160-4.5 mcg/actuation HFAA Take 2 Puffs by inhalation two (2) times a day. 8/23/18   Richard Blinks, DO   ALPRAZolam Deangelo Milks) 1 mg tablet Take 1 Tab by mouth two (2) times a day. Max Daily Amount: 2 mg. 4/24/17   Zenaida Brock MD   famotidine (PEPCID) 20 mg tablet Take 1 Tab by mouth daily. 3/9/17   Roland Diallo MD   montelukast (SINGULAIR) 10 mg tablet Take 1 Tab by mouth nightly. 3/9/17   Roland Diallo MD       Review of Systems:    14 systems were reviewed. The results are as above in the HPI and otherwise negative.      Objective: Vitals:    10/13/19 1951   Pulse: 90   Resp: 16   Temp: 98.3 °F (36.8 °C)   SpO2: 97%       Physical Exam:  GENERAL: alert, cooperative, no distress, appears stated age,   EYE: conjunctivae/corneas clear. PERRL, EOM's intact. THROAT & NECK: normal and no erythema or exudates noted. ,    LYMPHATIC: Cervical, supraclavicular, and axillary nodes normal. ,   LUNG: clear to auscultation bilaterally,   HEART: regular rate and rhythm, S1, S2 normal, no murmur, click, rub or gallop,   ABDOMEN: soft, morbidly obese, distended, firm mass to the left of the umbilicus which is very tender. Bowel sounds normal. No masses,  no organomegaly,   EXTREMITIES:  extremities normal, atraumatic, no cyanosis or edema,   SKIN: Normal.,   NEUROLOGIC: AOx3. Cranial nerves 2-12 and sensation grossly intact. ,     Data Review:    Recent Results (from the past 24 hour(s))   CBC WITH AUTOMATED DIFF    Collection Time: 10/13/19  7:29 PM   Result Value Ref Range    WBC 12.5 4.6 - 13.2 K/uL    RBC 4.72 4.20 - 5.30 M/uL    HGB 14.1 12.0 - 16.0 g/dL    HCT 42.4 35.0 - 45.0 %    MCV 89.8 74.0 - 97.0 FL    MCH 29.9 24.0 - 34.0 PG    MCHC 33.3 31.0 - 37.0 g/dL    RDW 13.3 11.6 - 14.5 %    PLATELET 872 184 - 239 K/uL    MPV 10.6 9.2 - 11.8 FL    NEUTROPHILS 72 40 - 73 %    LYMPHOCYTES 23 21 - 52 %    MONOCYTES 4 3 - 10 %    EOSINOPHILS 1 0 - 5 %    BASOPHILS 0 0 - 2 %    ABS. NEUTROPHILS 9.0 (H) 1.8 - 8.0 K/UL    ABS. LYMPHOCYTES 2.9 0.9 - 3.6 K/UL    ABS. MONOCYTES 0.4 0.05 - 1.2 K/UL    ABS. EOSINOPHILS 0.1 0.0 - 0.4 K/UL    ABS.  BASOPHILS 0.0 0.0 - 0.1 K/UL    DF AUTOMATED     METABOLIC PANEL, BASIC    Collection Time: 10/13/19  7:29 PM   Result Value Ref Range    Sodium 143 136 - 145 mmol/L    Potassium 3.2 (L) 3.5 - 5.5 mmol/L    Chloride 109 100 - 111 mmol/L    CO2 26 21 - 32 mmol/L    Anion gap 8 3.0 - 18 mmol/L    Glucose 143 (H) 74 - 99 mg/dL    BUN 14 7.0 - 18 MG/DL    Creatinine 1.15 0.6 - 1.3 MG/DL    BUN/Creatinine ratio 12 12 - 20 GFR est AA 59 (L) >60 ml/min/1.73m2    GFR est non-AA 49 (L) >60 ml/min/1.73m2    Calcium 9.4 8.5 - 10.1 MG/DL   HEPATIC FUNCTION PANEL    Collection Time: 10/13/19  7:29 PM   Result Value Ref Range    Protein, total 7.6 6.4 - 8.2 g/dL    Albumin 4.0 3.4 - 5.0 g/dL    Globulin 3.6 2.0 - 4.0 g/dL    A-G Ratio 1.1 0.8 - 1.7      Bilirubin, total 0.4 0.2 - 1.0 MG/DL    Bilirubin, direct 0.1 0.0 - 0.2 MG/DL    Alk. phosphatase 99 45 - 117 U/L    AST (SGOT) 10 10 - 38 U/L    ALT (SGPT) 22 13 - 56 U/L       Impression:     · Pt with an incarcerated incisional hernia.      Plan:     · Exploratory laparotomy with lysis of adhesions, possible bowel resection, primary incisional hernia repair  · Consent on chart  · Preoperative orders written    Signed By: Zee Verduzco MD     October 13, 2019

## 2019-10-14 NOTE — CDMP QUERY
Patient admitted with Incarcerated incisional hernia , noted to have COPD with home 02 use. If possible, please document in progress notes and d/c summary if you are evaluating and/or treating any of the following: 
 
 
? Chronic respiratory failure with hypercapnia ? Chronic respiratory failure with hypoxia 
? Other Explanation of clinical findings ? Clinically Undetermined (no explanation for clinical findings) The medical record reflects the following: 
 
   Risk Factors: 54 yo female admitted with incarcerated incisional hernia Clinical Indicators: PMzh includes COPD 
 
=Per Care Everywhere note   2L at home per pt for approx 8 years   
 
   Treatment: Currently on 3L /NC in house Thank you, 
Mercedes Salinas RN -[phone]

## 2019-10-14 NOTE — ANESTHESIA PREPROCEDURE EVALUATION
Relevant Problems   No relevant active problems       Anesthetic History   No history of anesthetic complications            Review of Systems / Medical History  Patient summary reviewed and pertinent labs reviewed    Pulmonary    COPD: moderate    Sleep apnea: No treatment    Asthma        Neuro/Psych   Within defined limits           Cardiovascular                  Exercise tolerance: <4 METS     GI/Hepatic/Renal                Endo/Other        Morbid obesity     Other Findings              Physical Exam    Airway  Mallampati: III  TM Distance: 4 - 6 cm  Neck ROM: decreased range of motion   Mouth opening: Diminished (comment)     Cardiovascular    Rhythm: regular  Rate: normal         Dental    Dentition: Poor dentition     Pulmonary  Breath sounds clear to auscultation               Abdominal  GI exam deferred       Other Findings            Anesthetic Plan    ASA: 3, emergent  Anesthesia type: general          Induction: Intravenous and RSI  Anesthetic plan and risks discussed with: Patient

## 2019-10-14 NOTE — OP NOTES
[facility]  General Surgery    Preoperative Dx: Incarcerated incisional hernia    Postoperative Dx:  Same    Procedure:  Exploratory laparotomy with lysis of adhesions and primary repair of incarcerated umbilical hernia    Surgeon:  Nely Romero MD, FACS    Assistant:  Jose Mesa SA    Anesthesia:  General    Findings:  Small bowel distal jejunum in hernia sac ischemic but viable    Specimen:  Hernia sac    EBL:  50 mL    Fluids:  1200 mL    Implants:  None    Complications:  None    Brief Operative Indication:  Incarcerated incisional hernia    Brief Operative Note:  The patient was identified in the holding area where consent for exploratory laparotomy with lysis of adhesions possible bowel resection and primary repair of incarcerated incisional hernia was verified. Once in the procedure room, general anesthesia was obtained/induced. Walker catheter was inserted. The abdomen was prepped and draped in sterile fashion using chlorhexidine solution. Time out was performed to ensure correct procedure. Briefing was performed. The 10 blade was used to create an incision through the skin and subcutaneous tissue from approximately 8 cm above the umbilicus to 8 cm below the umbilicus going over the hernia. Electrocautery was used to dissect down through skin and subcutaneous tissues for the length of the incision to expose the underlying fascia hernia sac. The hernia sac was opened. The bowel loop was noted to be ischemic. The upper fascia was opened down to the neck of the hernia to allow the bowel to be released back into the abdomen. Blood supply did return to the bowel once it was released into the abdomen. The hernia sac was completely removed passed off the table to be sent to pathology. The fascia was opened for the length of the incision. The anastomosis side to side small bowel anastomosis which had been created at her previous surgery was intact and healthy.   The cecum and terminal ileum were healthy. The bowel was run up to the ligament of Treitz. No other abnormalities were noted. The abdomen was irrigated with 2 L of warm saline solution. Soft and instrument counts were correct. The fascia was closed using #1 single-stranded PDS suture with a stitch starting in the cephalad apex and sewn to below the umbilicus. A stitch was then started in the caudal apex and sewn to meet the initial stitch below the umbilicus. A good fascial closure was obtained. 3-0 Vicryl suture was used to reapproximate the deep dermal tissues and interrupted stitches. Staples were used to reapproximate the skin. Sterile dressings were applied. Patient tolerated the procedure very well. Disposition she was extubated and stable upon transport to the recovery room.

## 2019-10-14 NOTE — PROGRESS NOTES
Progress Note         Patient: Sharri Sanabria MRN: [id number]  CSN: [id number]    YOB: 1965  Age: 53 y.o. Sex: female    DOA: 10/13/2019 LOS:  LOS: 1 day                    Subjective:     Sharri Sanabria is a 53 y.o. female is admitted for incarcerated incisional hernia. Hospitalist group is consulted for HTN, COPD, anxiety/depression.     - Ex lab w/lysis of adhesions and repair of incarcerated umbilical hernia last night   - reports she was just discharged from hospital for CAP     - chart review shows she was admitted on 9/27/19 for COPD exacerbation, CAP  - was treated accordingly, left AMA on 10/4/19 when not given narcotics; not discharged with any Abx   - reports she takes Percocet 10 mg q4hrs and has for >10 yrs; reports she was in pain management but not currently, her PCP reportedly prescribes her pain meds     - c/o abd pain this am      Review of systems  General: No fevers or chills. Cardiovascular: No chest pain or pressure. Pulmonary: + coughing, wheezing, mild SOB   Gastrointestinal: + Abd pain. No nausea, vomiting or diarrhea. Genitourinary: No urinary frequency, urgency, hesitancy or dysuria. Musculoskeletal: + Neck, back pain       Objective:     Physical Exam:  Visit Vitals  /81 (BP 1 Location: Right arm, BP Patient Position: At rest)   Pulse 92   Temp 98 °F (36.7 °C)   Resp 20   Ht 4' 11\" (1.499 m)   Wt 87.1 kg (192 lb)   SpO2 100%   Breastfeeding? No   BMI 38.78 kg/m²        General:         Alert, cooperative, no mild distress    HEENT: NC, Atraumatic. Anicteric sclerae. Lungs: CTA Bilaterally. No rales or rhonchi; diminished and coarse breath sounds with bilaterally  Heart:  Regular  rhythm, No murmur, No Rubs, No Gallops  Abdomen: + Tender; surgical site c/d/i; Soft, Non distended, +Bowel sounds, no HSM  Extremities: No c/c/e  Psych:   Quite anxious   Neurologic:  Alert and oriented X 3. No acute neurological deficits.      Intake and Output:  Current Shift:  No intake/output data recorded. Last three shifts:  10/12 1901 - 10/14 0700  In: 2232.1 [P.O.:480; I.V.:1752.1]  Out: 565 [Urine:540]    Labs: Results:       Chemistry Recent Labs     10/13/19  1929   *      K 3.2*      CO2 26   BUN 14   CREA 1.15   CA 9.4   AGAP 8   BUCR 12   AP 99   TP 7.6   ALB 4.0   GLOB 3.6   AGRAT 1.1      CBC w/Diff Recent Labs     10/13/19  1929   WBC 12.5   RBC 4.72   HGB 14.1   HCT 42.4      GRANS 72   LYMPH 23   EOS 1      Cardiac Enzymes No results for input(s): CPK, CKND1, JOSE in the last 72 hours. No lab exists for component: CKRMB, TROIP   Coagulation No results for input(s): PTP, INR, APTT, INREXT in the last 72 hours. Lipid Panel No results found for: CHOL, CHOLPOCT, CHOLX, CHLST, CHOLV, 715904, HDL, HDLP, LDL, LDLC, DLDLP, 177997, VLDLC, VLDL, TGLX, TRIGL, TRIGP, TGLPOCT, CHHD, CHHDX   BNP No results for input(s): BNPP in the last 72 hours.    Liver Enzymes Recent Labs     10/13/19  1929   TP 7.6   ALB 4.0   AP 99   SGOT 10      Thyroid Studies Lab Results   Component Value Date/Time    TSH 0.18 (L) 09/27/2019 10:50 PM          Procedures/imaging: see electronic medical records for all procedures/Xrays and details which were not copied into this note but were reviewed prior to creation of Plan    Medications:   Current Facility-Administered Medications   Medication Dose Route Frequency    influenza vaccine 2019-20 (6 mos+)(PF) (FLUARIX/FLULAVAL/FLUZONE QUAD) injection 0.5 mL  0.5 mL IntraMUSCular PRIOR TO DISCHARGE    sodium chloride (NS) flush 5-40 mL  5-40 mL IntraVENous Q8H    sodium chloride (NS) flush 5-40 mL  5-40 mL IntraVENous PRN    lactated Ringers infusion  125 mL/hr IntraVENous CONTINUOUS    oxyCODONE-acetaminophen (PERCOCET) 5-325 mg per tablet 2 Tab  2 Tab Oral Q4H PRN    HYDROmorphone (DILAUDID) injection 1 mg  1 mg IntraVENous Q2H PRN    naloxone (NARCAN) injection 0.4 mg  0.4 mg IntraVENous PRN    ondansetron (ZOFRAN) injection 4 mg  4 mg IntraVENous Q4H PRN    diphenhydrAMINE (BENADRYL) injection 12.5 mg  12.5 mg IntraVENous Q4H PRN    LORazepam (ATIVAN) injection 1 mg  1 mg IntraVENous Q6H PRN    heparin (porcine) injection 5,000 Units  5,000 Units SubCUTAneous Q8H    ketorolac (TORADOL) injection 15 mg  15 mg IntraVENous Q8H    labetalol (NORMODYNE;TRANDATE) 20 mg/4 mL (5 mg/mL) injection 5-10 mg  5-10 mg IntraVENous Q5MIN PRN    arformoterol (BROVANA) neb solution 15 mcg  15 mcg Nebulization BID RT    budesonide (PULMICORT) 500 mcg/2 ml nebulizer suspension  500 mcg Nebulization BID RT    cloNIDine (CATAPRES) 0.2 mg/24 hr patch 1 Patch  1 Patch TransDERmal Q7D    promethazine (PHENERGAN) 12.5 mg in 0.9% sodium chloride 50 mL IVPB  12.5 mg IntraVENous ONCE    hydrALAZINE (APRESOLINE) 20 mg/mL injection 20 mg  20 mg IntraVENous Q6H PRN    metoprolol (LOPRESSOR) injection 1.25 mg  1.25 mg IntraVENous Q4H    albuterol-ipratropium (DUO-NEB) 2.5 MG-0.5 MG/3 ML  3 mL Nebulization Q4H PRN    famotidine (PF) (PEPCID) 20 mg in sodium chloride 0.9% 10 mL injection  20 mg IntraVENous Q24H           Assessment and Plan:     Pt. Is a 53 y.o. female, admitted for incarcerated incisional hernia. Condition is Stable. 1. S/p Exploratory laparotomy with lysis of adhesions and primary repair of incarcerated umbilical hernia  2. Abdominal pain  3. HTN  4. COPD without exacerbation  5. Chronic pain syndrome  6. Anxiety/depression     Continue telemetry. Continue clonidine patch, IV lopressor and prn IV hydralazine  We will continue Brovana and Pulmicort nebulizer.    Continue Pepcid  Continue IV ativan prn  Will start PO medications once cleared by primary team  Dr. Tatyana Gaspar to follow for pain and diet management     Code status: FULL   Disposition: home, >2 midnights       Case discussed with:  [x]Patient  []Family  [x]Nursing  [x]Case Management  DVT Prophylaxis:  []Lovenox  [x]Hep SQ  []SCDs  []Coumadin   []On Heparin Martha Hearn, DO  10/14/2019

## 2019-10-14 NOTE — PROGRESS NOTES
Bedside and Verbal shift change report given to andrade (oncoming nurse) by Brian Zamarripa (offgoing nurse). Report included the following information SBAR, Kardex, Procedure Summary, MAR and Recent Results.

## 2019-10-14 NOTE — ANESTHESIA POSTPROCEDURE EVALUATION
Procedure(s):  LAPAROTOMY EXPLORATORY/LYSIS OF ADHESIONS/PRIMARY REPAIR INCARCERATED HERNIA  HERNIA INGUINAL REPAIR. general    Anesthesia Post Evaluation      Multimodal analgesia: multimodal analgesia used between 6 hours prior to anesthesia start to PACU discharge  Patient location during evaluation: bedside  Patient participation: complete - patient participated  Level of consciousness: awake  Pain management: adequate  Airway patency: patent  Anesthetic complications: no  Cardiovascular status: stable  Respiratory status: acceptable  Hydration status: acceptable  Post anesthesia nausea and vomiting:  controlled      Vitals Value Taken Time   /73 10/14/2019 12:09 AM   Temp 36.9 °C (98.4 °F) 10/13/2019 11:15 PM   Pulse 82 10/14/2019 12:17 AM   Resp 17 10/14/2019 12:17 AM   SpO2 100 % 10/14/2019 12:17 AM   Vitals shown include unvalidated device data.

## 2019-10-14 NOTE — PROGRESS NOTES
Reason for Admission:  Incarcerated incisional hernia [K43.0]                 RRAT Score:    13           Plan for utilizing home health:    No orders at this time                    Likelihood of Readmission:   Moderate                         Do you (patient/family) have any concerns for transition/discharge? yes    Transition of Care Plan:       Initial assessment completed with patient. Cognitive status of patient: oriented to time, place, person and situation. Face sheet information confirmed:  yes. The patient designates Bob Madera, daughter ([phone]) to participate in her discharge plan and to receive any needed information. This patient lives in a single family home with family. Patient is not able to navigate steps as needed. Prior to hospitalization, patient was considered to be independent with ADLs/IADLS : no . If not independent,  patient needs assist with : dressing, bathing, food preparation, cooking and grooming. 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 Patient has a current ACP document on file: yes  The patient and sister will be available to transport patient home upon discharge. The patient already has Walker, W/C, home oxygen with concentrator and tanks (2LNC continuous) and CPAP medical equipment available in the home. Patient is not currently active with home health. Patient has not stayed in a skilled nursing facility or rehab. This patient is on dialysis :no     Freedom of choice signed: no.     Currently, the discharge plan is Home with family assistance. The patient states that she can obtain her medications from the pharmacy, and take her medications as directed. Patient's current insurance is  [facility] Medicaid/[employer].       Care Management Interventions  PCP Verified by CM: Yes(Per patient, pcp is Mendez Crystal NP on [facility] in [place].)  Last Visit to PCP: 08/16/19  Mode of Transport at Discharge: Self  Transition of Care Consult (CM Consult): Discharge Planning  MyChart Signup: No  Discharge Durable Medical Equipment: No  Physical Therapy Consult: No  Occupational Therapy Consult: No  Speech Therapy Consult: No  Current Support Network: Relative's Home, Lives with Caregiver  Confirm Follow Up Transport: Family  Plan discussed with Pt/Family/Caregiver: Yes  Discharge Location  Discharge Placement: Home with family assistance    CHARITO ToribioN, RN  Pager # [phone]  Care Manager

## 2019-10-14 NOTE — PROGRESS NOTES
Mukesh Lopez M.D. FACS  PROGRESS NOTE    Name: Laure Sears MRN: [id number]   : 1965 [facility]   Date: 10/14/2019 Admission Date: 10/13/2019  6:54 PM     Hospital Day: 2  1 Day Post-Op  Subjective:  Patient without acute overnight events. Objective:  Vitals:    10/14/19 0009 10/14/19 0055 10/14/19 0444 10/14/19 0631   BP: 166/73 (!) 184/98 138/81    Pulse: 82 88 92    Resp: 16 18 20    Temp:  98.9 °F (37.2 °C) 98 °F (36.7 °C)    SpO2: 100% 98% 100%    Weight:    87.1 kg (192 lb)   Height:    4' 11\" (1.499 m)     Date 10/13/19 0700 - 10/14/19 0659 10/14/19 0700 - 10/15/19 0659   Shift 2616-6818 4607-5524 24 Hour Total 3952-5162 8907-5357 24 Hour Total   INTAKE   P.O.  480 480        P. O.  480 480      I.V.  1752.1(1.7) 1752.1(0.8)        Volume (lactated Ringers infusion)  1000 1000        Volume (lactated Ringers infusion)  752.1 752.1      Shift Total(mL/kg)  2232. 1(25.6) 2232. 1(25.6)      OUTPUT   Urine  540(0.5) 540(0.3)        Urine Output (mL) (Urinary Catheter 10/13/19 Walker)  540 540      Blood  25 25        Estimated Blood Loss  25 25      Shift Total(mL/kg)  565(6.5) 565(6.5)      NET  1667.1 1667.1      Weight (kg)  87.1 87.1 87.1 87.1 87.1         Physical Exam:    General: Awake and alert, no apparent distress uncomfortable   Abdomen: abdomen is soft with midline incisional tenderness. Incision(s) are C/D/I.   No masses, organomegaly or guarding    Labs:  Recent Results (from the past 24 hour(s))   CBC WITH AUTOMATED DIFF    Collection Time: 10/13/19  7:29 PM   Result Value Ref Range    WBC 12.5 4.6 - 13.2 K/uL    RBC 4.72 4.20 - 5.30 M/uL    HGB 14.1 12.0 - 16.0 g/dL    HCT 42.4 35.0 - 45.0 %    MCV 89.8 74.0 - 97.0 FL    MCH 29.9 24.0 - 34.0 PG    MCHC 33.3 31.0 - 37.0 g/dL    RDW 13.3 11.6 - 14.5 %    PLATELET 418 568 - 992 K/uL    MPV 10.6 9.2 - 11.8 FL    NEUTROPHILS 72 40 - 73 %    LYMPHOCYTES 23 21 - 52 %    MONOCYTES 4 3 - 10 %    EOSINOPHILS 1 0 - 5 % BASOPHILS 0 0 - 2 %    ABS. NEUTROPHILS 9.0 (H) 1.8 - 8.0 K/UL    ABS. LYMPHOCYTES 2.9 0.9 - 3.6 K/UL    ABS. MONOCYTES 0.4 0.05 - 1.2 K/UL    ABS. EOSINOPHILS 0.1 0.0 - 0.4 K/UL    ABS. BASOPHILS 0.0 0.0 - 0.1 K/UL    DF AUTOMATED     METABOLIC PANEL, BASIC    Collection Time: 10/13/19  7:29 PM   Result Value Ref Range    Sodium 143 136 - 145 mmol/L    Potassium 3.2 (L) 3.5 - 5.5 mmol/L    Chloride 109 100 - 111 mmol/L    CO2 26 21 - 32 mmol/L    Anion gap 8 3.0 - 18 mmol/L    Glucose 143 (H) 74 - 99 mg/dL    BUN 14 7.0 - 18 MG/DL    Creatinine 1.15 0.6 - 1.3 MG/DL    BUN/Creatinine ratio 12 12 - 20      GFR est AA 59 (L) >60 ml/min/1.73m2    GFR est non-AA 49 (L) >60 ml/min/1.73m2    Calcium 9.4 8.5 - 10.1 MG/DL   HEPATIC FUNCTION PANEL    Collection Time: 10/13/19  7:29 PM   Result Value Ref Range    Protein, total 7.6 6.4 - 8.2 g/dL    Albumin 4.0 3.4 - 5.0 g/dL    Globulin 3.6 2.0 - 4.0 g/dL    A-G Ratio 1.1 0.8 - 1.7      Bilirubin, total 0.4 0.2 - 1.0 MG/DL    Bilirubin, direct 0.1 0.0 - 0.2 MG/DL    Alk.  phosphatase 99 45 - 117 U/L    AST (SGOT) 10 10 - 38 U/L    ALT (SGPT) 22 13 - 56 U/L   EKG, 12 LEAD, INITIAL    Collection Time: 10/13/19  8:12 PM   Result Value Ref Range    Ventricular Rate 91 BPM    Atrial Rate 91 BPM    P-R Interval 134 ms    QRS Duration 80 ms    Q-T Interval 386 ms    QTC Calculation (Bezet) 474 ms    Calculated P Axis 79 degrees    Calculated R Axis 54 degrees    Calculated T Axis 85 degrees    Diagnosis       Normal sinus rhythm  Possible Left atrial enlargement  Left ventricular hypertrophy  Nonspecific T wave abnormality  Prolonged QT  Abnormal ECG  When compared with ECG of 06-OCT-2019 03:43,  Nonspecific T wave abnormality now evident in Lateral leads     URINALYSIS W/ RFLX MICROSCOPIC    Collection Time: 10/14/19  7:40 AM   Result Value Ref Range    Color YELLOW      Appearance CLEAR      Specific gravity 1.014 1.005 - 1.030      pH (UA) 7.5 5.0 - 8.0      Protein TRACE (A) NEG mg/dL    Glucose 100 (A) NEG mg/dL    Ketone 15 (A) NEG mg/dL    Bilirubin NEGATIVE  NEG      Blood NEGATIVE  NEG      Urobilinogen 0.2 0.2 - 1.0 EU/dL    Nitrites NEGATIVE  NEG      Leukocyte Esterase NEGATIVE  NEG     URINE MICROSCOPIC ONLY    Collection Time: 10/14/19  7:40 AM   Result Value Ref Range    WBC 1 to 2 0 - 4 /hpf    RBC 0 to 1 0 - 5 /hpf    Epithelial cells FEW 0 - 5 /lpf    Bacteria NEGATIVE  NEG /hpf   METABOLIC PANEL, BASIC    Collection Time: 10/14/19  7:54 AM   Result Value Ref Range    Sodium 142 136 - 145 mmol/L    Potassium 4.4 3.5 - 5.5 mmol/L    Chloride 111 100 - 111 mmol/L    CO2 23 21 - 32 mmol/L    Anion gap 8 3.0 - 18 mmol/L    Glucose 147 (H) 74 - 99 mg/dL    BUN 14 7.0 - 18 MG/DL    Creatinine 1.14 0.6 - 1.3 MG/DL    BUN/Creatinine ratio 12 12 - 20      GFR est AA 60 (L) >60 ml/min/1.73m2    GFR est non-AA 49 (L) >60 ml/min/1.73m2    Calcium 8.3 (L) 8.5 - 10.1 MG/DL   HEPATIC FUNCTION PANEL    Collection Time: 10/14/19  7:54 AM   Result Value Ref Range    Protein, total 6.3 (L) 6.4 - 8.2 g/dL    Albumin 3.2 (L) 3.4 - 5.0 g/dL    Globulin 3.1 2.0 - 4.0 g/dL    A-G Ratio 1.0 0.8 - 1.7      Bilirubin, total 0.4 0.2 - 1.0 MG/DL    Bilirubin, direct 0.1 0.0 - 0.2 MG/DL    Alk.  phosphatase 85 45 - 117 U/L    AST (SGOT) 9 (L) 10 - 38 U/L    ALT (SGPT) 17 13 - 56 U/L   MAGNESIUM    Collection Time: 10/14/19  7:54 AM   Result Value Ref Range    Magnesium 1.7 1.6 - 2.6 mg/dL   PHOSPHORUS    Collection Time: 10/14/19  7:54 AM   Result Value Ref Range    Phosphorus 3.6 2.5 - 4.9 MG/DL   CBC WITH AUTOMATED DIFF    Collection Time: 10/14/19  7:54 AM   Result Value Ref Range    WBC 17.1 (H) 4.6 - 13.2 K/uL    RBC 4.18 (L) 4.20 - 5.30 M/uL    HGB 12.3 12.0 - 16.0 g/dL    HCT 37.8 35.0 - 45.0 %    MCV 90.4 74.0 - 97.0 FL    MCH 29.4 24.0 - 34.0 PG    MCHC 32.5 31.0 - 37.0 g/dL    RDW 13.4 11.6 - 14.5 %    PLATELET 725 240 - 267 K/uL    MPV 10.5 9.2 - 11.8 FL    NEUTROPHILS PENDING %    LYMPHOCYTES PENDING %    MONOCYTES PENDING %    EOSINOPHILS PENDING %    BASOPHILS PENDING %    ABS. NEUTROPHILS PENDING K/UL    ABS. LYMPHOCYTES PENDING K/UL    ABS. MONOCYTES PENDING K/UL    ABS. EOSINOPHILS PENDING K/UL    ABS.  BASOPHILS PENDING K/UL    DF PENDING      All Micro Results     None          Current Medications:  Current Facility-Administered Medications   Medication Dose Route Frequency Provider Last Rate Last Dose    influenza vaccine 2019-20 (6 mos+)(PF) (FLUARIX/FLULAVAL/FLUZONE QUAD) injection 0.5 mL  0.5 mL IntraMUSCular PRIOR TO DISCHARGE Christian Mcallister MD        sodium chloride (NS) flush 5-40 mL  5-40 mL IntraVENous Q8H Ti Fonseca MD   10 mL at 10/14/19 0537    sodium chloride (NS) flush 5-40 mL  5-40 mL IntraVENous PRN Ti Fonseca MD        lactated Ringers infusion  125 mL/hr IntraVENous CONTINUOUS Ti Fonseca  mL/hr at 10/14/19 0058 125 mL/hr at 10/14/19 0058    oxyCODONE-acetaminophen (PERCOCET) 5-325 mg per tablet 2 Tab  2 Tab Oral Q4H PRN Ti Fonseca MD        HYDROmorphone (DILAUDID) injection 1 mg  1 mg IntraVENous Q2H PRN Ti Fonseca MD   1 mg at 10/14/19 3425    naloxone (NARCAN) injection 0.4 mg  0.4 mg IntraVENous PRN Ti Fonseca MD        ondansetron [facility]) injection 4 mg  4 mg IntraVENous Q4H PRN Ti Fonseca MD   4 mg at 10/14/19 0051    diphenhydrAMINE (BENADRYL) injection 12.5 mg  12.5 mg IntraVENous Q4H PRN Ti Fonseca MD        LORazepam (ATIVAN) injection 1 mg  1 mg IntraVENous Q6H PRN Ti Fonseca MD   1 mg at 10/13/19 3937    heparin (porcine) injection 5,000 Units  5,000 Units SubCUTAneous Q8H Ti Fonseca MD   5,000 Units at 10/14/19 0345    ketorolac (TORADOL) injection 15 mg  15 mg IntraVENous Q8H Ti Fonseca MD   15 mg at 10/14/19 0536    labetalol (NORMODYNE;TRANDATE) 20 mg/4 mL (5 mg/mL) injection 5-10 mg  5-10 mg IntraVENous Q5MIN PRN Lolis Mishra, CRNA   5 mg at 10/13/19 2348    arformoterol (BROVANA) neb solution 15 mcg  15 mcg Nebulization BID RT Reyna Charlton MD   15 mcg at 10/14/19 0803    budesonide (PULMICORT) 500 mcg/2 ml nebulizer suspension  500 mcg Nebulization BID RT Reyna Charlton MD   500 mcg at 10/14/19 0803    cloNIDine (CATAPRES) 0.2 mg/24 hr patch 1 Patch  1 Patch TransDERmal Q7D Reyna Charlton MD   1 Patch at 10/14/19 0344    promethazine (PHENERGAN) 12.5 mg in 0.9% sodium chloride 50 mL IVPB  12.5 mg IntraVENous Granville Dottie, CRNA   Stopped at 10/14/19 0058    hydrALAZINE (APRESOLINE) 20 mg/mL injection 20 mg  20 mg IntraVENous Q6H PRN Reyna Charlton MD        metoprolol (LOPRESSOR) injection 1.25 mg  1.25 mg IntraVENous Q4H Reyna Charlton MD   1.25 mg at 10/14/19 0824    albuterol-ipratropium (DUO-NEB) 2.5 MG-0.5 MG/3 ML  3 mL Nebulization Q4H PRN Reyna Charlton MD        famotidine (PF) (PEPCID) 20 mg in sodium chloride 0.9% 10 mL injection  20 mg IntraVENous Q24H Reyna Charlton MD         IMPRESSION:   · Postop day 1 from ex lap with lysis of adhesions to allow primary repair of incarcerated incisional hernia. PLAN:/DISCUSION:   · Medical management by hospitalist is appreciated. · Dilaudid for breakthrough pain only. Give the p.o. Percocet preferentially  · Incentive spirometry usage, out of bed to chair  · SCDs to bilateral lower extremities while in bed.   Subcu heparin 5000 units every 8 hours  · Clear liquid diet        Juliet Vega MD

## 2019-10-15 LAB
ANION GAP SERPL CALC-SCNC: 5 MMOL/L (ref 3–18)
BASOPHILS # BLD: 0 K/UL (ref 0–0.1)
BASOPHILS NFR BLD: 0 % (ref 0–2)
BUN SERPL-MCNC: 14 MG/DL (ref 7–18)
BUN/CREAT SERPL: 15 (ref 12–20)
CALCIUM SERPL-MCNC: 7.8 MG/DL (ref 8.5–10.1)
CHLORIDE SERPL-SCNC: 109 MMOL/L (ref 100–111)
CO2 SERPL-SCNC: 25 MMOL/L (ref 21–32)
CREAT SERPL-MCNC: 0.95 MG/DL (ref 0.6–1.3)
DIFFERENTIAL METHOD BLD: ABNORMAL
EOSINOPHIL # BLD: 0.1 K/UL (ref 0–0.4)
EOSINOPHIL NFR BLD: 1 % (ref 0–5)
ERYTHROCYTE [DISTWIDTH] IN BLOOD BY AUTOMATED COUNT: 13.3 % (ref 11.6–14.5)
GLUCOSE SERPL-MCNC: 80 MG/DL (ref 74–99)
HCT VFR BLD AUTO: 31.5 % (ref 35–45)
HGB BLD-MCNC: 10.1 G/DL (ref 12–16)
LYMPHOCYTES # BLD: 2.1 K/UL (ref 0.9–3.6)
LYMPHOCYTES NFR BLD: 22 % (ref 21–52)
MCH RBC QN AUTO: 29.2 PG (ref 24–34)
MCHC RBC AUTO-ENTMCNC: 32.1 G/DL (ref 31–37)
MCV RBC AUTO: 91 FL (ref 74–97)
MONOCYTES # BLD: 0.6 K/UL (ref 0.05–1.2)
MONOCYTES NFR BLD: 6 % (ref 3–10)
NEUTS SEG # BLD: 6.5 K/UL (ref 1.8–8)
NEUTS SEG NFR BLD: 71 % (ref 40–73)
PLATELET # BLD AUTO: 175 K/UL (ref 135–420)
PMV BLD AUTO: 11.1 FL (ref 9.2–11.8)
POTASSIUM SERPL-SCNC: 3.8 MMOL/L (ref 3.5–5.5)
RBC # BLD AUTO: 3.46 M/UL (ref 4.2–5.3)
SODIUM SERPL-SCNC: 139 MMOL/L (ref 136–145)
WBC # BLD AUTO: 9.2 K/UL (ref 4.6–13.2)

## 2019-10-15 PROCEDURE — 74011250636 HC RX REV CODE- 250/636: Performed by: SURGERY

## 2019-10-15 PROCEDURE — 74011250637 HC RX REV CODE- 250/637: Performed by: FAMILY MEDICINE

## 2019-10-15 PROCEDURE — 74011250636 HC RX REV CODE- 250/636: Performed by: INTERNAL MEDICINE

## 2019-10-15 PROCEDURE — 77030027138 HC INCENT SPIROMETER -A

## 2019-10-15 PROCEDURE — 94640 AIRWAY INHALATION TREATMENT: CPT

## 2019-10-15 PROCEDURE — 74011000250 HC RX REV CODE- 250: Performed by: INTERNAL MEDICINE

## 2019-10-15 PROCEDURE — 65270000029 HC RM PRIVATE

## 2019-10-15 PROCEDURE — 80048 BASIC METABOLIC PNL TOTAL CA: CPT

## 2019-10-15 PROCEDURE — 74011250637 HC RX REV CODE- 250/637: Performed by: SURGERY

## 2019-10-15 PROCEDURE — 85025 COMPLETE CBC W/AUTO DIFF WBC: CPT

## 2019-10-15 PROCEDURE — 74011250636 HC RX REV CODE- 250/636: Performed by: FAMILY MEDICINE

## 2019-10-15 PROCEDURE — 36415 COLL VENOUS BLD VENIPUNCTURE: CPT

## 2019-10-15 RX ORDER — LISINOPRIL 20 MG/1
20 TABLET ORAL DAILY
Status: DISCONTINUED | OUTPATIENT
Start: 2019-10-15 | End: 2019-10-17 | Stop reason: HOSPADM

## 2019-10-15 RX ORDER — CLONIDINE HYDROCHLORIDE 0.1 MG/1
0.2 TABLET ORAL 2 TIMES DAILY
Status: DISCONTINUED | OUTPATIENT
Start: 2019-10-15 | End: 2019-10-17 | Stop reason: HOSPADM

## 2019-10-15 RX ORDER — METOPROLOL TARTRATE 25 MG/1
12.5 TABLET, FILM COATED ORAL EVERY 12 HOURS
Status: DISCONTINUED | OUTPATIENT
Start: 2019-10-15 | End: 2019-10-17 | Stop reason: HOSPADM

## 2019-10-15 RX ORDER — AMLODIPINE BESYLATE 10 MG/1
10 TABLET ORAL DAILY
Status: DISCONTINUED | OUTPATIENT
Start: 2019-10-15 | End: 2019-10-17 | Stop reason: HOSPADM

## 2019-10-15 RX ADMIN — KETOROLAC TROMETHAMINE 15 MG: 15 INJECTION, SOLUTION INTRAMUSCULAR; INTRAVENOUS at 14:32

## 2019-10-15 RX ADMIN — HEPARIN SODIUM 5000 UNITS: 5000 INJECTION INTRAVENOUS; SUBCUTANEOUS at 04:02

## 2019-10-15 RX ADMIN — HYDROMORPHONE HYDROCHLORIDE 1 MG: 1 INJECTION, SOLUTION INTRAMUSCULAR; INTRAVENOUS; SUBCUTANEOUS at 11:30

## 2019-10-15 RX ADMIN — HYDROMORPHONE HYDROCHLORIDE 1 MG: 1 INJECTION, SOLUTION INTRAMUSCULAR; INTRAVENOUS; SUBCUTANEOUS at 15:41

## 2019-10-15 RX ADMIN — OXYCODONE HYDROCHLORIDE AND ACETAMINOPHEN 2 TABLET: 5; 325 TABLET ORAL at 14:32

## 2019-10-15 RX ADMIN — METOPROLOL TARTRATE 1.25 MG: 5 INJECTION, SOLUTION INTRAVENOUS at 05:53

## 2019-10-15 RX ADMIN — AMLODIPINE BESYLATE 10 MG: 10 TABLET ORAL at 10:24

## 2019-10-15 RX ADMIN — HYDROMORPHONE HYDROCHLORIDE 1 MG: 1 INJECTION, SOLUTION INTRAMUSCULAR; INTRAVENOUS; SUBCUTANEOUS at 22:25

## 2019-10-15 RX ADMIN — LISINOPRIL 20 MG: 20 TABLET ORAL at 10:24

## 2019-10-15 RX ADMIN — METOPROLOL TARTRATE 1.25 MG: 5 INJECTION, SOLUTION INTRAVENOUS at 08:13

## 2019-10-15 RX ADMIN — OXYCODONE HYDROCHLORIDE AND ACETAMINOPHEN 2 TABLET: 5; 325 TABLET ORAL at 18:30

## 2019-10-15 RX ADMIN — KETOROLAC TROMETHAMINE 15 MG: 15 INJECTION, SOLUTION INTRAMUSCULAR; INTRAVENOUS at 05:53

## 2019-10-15 RX ADMIN — HEPARIN SODIUM 5000 UNITS: 5000 INJECTION INTRAVENOUS; SUBCUTANEOUS at 22:14

## 2019-10-15 RX ADMIN — FAMOTIDINE 20 MG: 10 INJECTION INTRAVENOUS at 22:13

## 2019-10-15 RX ADMIN — BUDESONIDE 500 MCG: 0.5 INHALANT RESPIRATORY (INHALATION) at 08:06

## 2019-10-15 RX ADMIN — HYDROMORPHONE HYDROCHLORIDE 1 MG: 1 INJECTION, SOLUTION INTRAMUSCULAR; INTRAVENOUS; SUBCUTANEOUS at 08:13

## 2019-10-15 RX ADMIN — HEPARIN SODIUM 5000 UNITS: 5000 INJECTION INTRAVENOUS; SUBCUTANEOUS at 13:33

## 2019-10-15 RX ADMIN — Medication 10 ML: at 14:00

## 2019-10-15 RX ADMIN — HYDROMORPHONE HYDROCHLORIDE 1 MG: 1 INJECTION, SOLUTION INTRAMUSCULAR; INTRAVENOUS; SUBCUTANEOUS at 13:34

## 2019-10-15 RX ADMIN — FAMOTIDINE 20 MG: 10 INJECTION INTRAVENOUS at 08:13

## 2019-10-15 RX ADMIN — ARFORMOTEROL TARTRATE 15 MCG: 15 SOLUTION RESPIRATORY (INHALATION) at 19:30

## 2019-10-15 RX ADMIN — OXYCODONE HYDROCHLORIDE AND ACETAMINOPHEN 2 TABLET: 5; 325 TABLET ORAL at 10:24

## 2019-10-15 RX ADMIN — METOPROLOL TARTRATE 1.25 MG: 5 INJECTION, SOLUTION INTRAVENOUS at 01:14

## 2019-10-15 RX ADMIN — HYDROMORPHONE HYDROCHLORIDE 1 MG: 1 INJECTION, SOLUTION INTRAMUSCULAR; INTRAVENOUS; SUBCUTANEOUS at 06:28

## 2019-10-15 RX ADMIN — HYDROMORPHONE HYDROCHLORIDE 1 MG: 1 INJECTION, SOLUTION INTRAMUSCULAR; INTRAVENOUS; SUBCUTANEOUS at 19:52

## 2019-10-15 RX ADMIN — LORAZEPAM 1 MG: 2 INJECTION, SOLUTION INTRAMUSCULAR; INTRAVENOUS at 01:14

## 2019-10-15 RX ADMIN — ARFORMOTEROL TARTRATE 15 MCG: 15 SOLUTION RESPIRATORY (INHALATION) at 08:05

## 2019-10-15 RX ADMIN — HYDROMORPHONE HYDROCHLORIDE 1 MG: 1 INJECTION, SOLUTION INTRAMUSCULAR; INTRAVENOUS; SUBCUTANEOUS at 01:14

## 2019-10-15 RX ADMIN — METOPROLOL TARTRATE 12.5 MG: 25 TABLET ORAL at 22:13

## 2019-10-15 RX ADMIN — KETOROLAC TROMETHAMINE 15 MG: 15 INJECTION, SOLUTION INTRAMUSCULAR; INTRAVENOUS at 22:48

## 2019-10-15 RX ADMIN — METOPROLOL TARTRATE 12.5 MG: 25 TABLET ORAL at 10:24

## 2019-10-15 RX ADMIN — SODIUM CHLORIDE, SODIUM LACTATE, POTASSIUM CHLORIDE, AND CALCIUM CHLORIDE 75 ML/HR: 600; 310; 30; 20 INJECTION, SOLUTION INTRAVENOUS at 14:40

## 2019-10-15 RX ADMIN — SODIUM CHLORIDE, SODIUM LACTATE, POTASSIUM CHLORIDE, AND CALCIUM CHLORIDE 125 ML/HR: 600; 310; 30; 20 INJECTION, SOLUTION INTRAVENOUS at 05:49

## 2019-10-15 RX ADMIN — CLONIDINE HYDROCHLORIDE 0.2 MG: 0.1 TABLET ORAL at 17:53

## 2019-10-15 RX ADMIN — OXYCODONE HYDROCHLORIDE AND ACETAMINOPHEN 2 TABLET: 5; 325 TABLET ORAL at 06:28

## 2019-10-15 RX ADMIN — BUDESONIDE 500 MCG: 0.5 INHALANT RESPIRATORY (INHALATION) at 19:30

## 2019-10-15 NOTE — ROUTINE PROCESS
Bedside shift change report given to Birdie Wallace (oncoming nurse) by  Mary Carmen Kat (offgoing nurse). Report included the following information SBAR, Kardex, ED Summary and Intake/Output.

## 2019-10-15 NOTE — PROGRESS NOTES
Progress Note         Patient: Rell Chadwick MRN: [id number]  CSN: [id number]    YOB: 1965  Age: 53 y.o. Sex: female    DOA: 10/13/2019 LOS:  LOS: 2 days                    Subjective:     Rell Chadwick is a 53 y.o. female is admitted for incarcerated incisional hernia. Hospitalist group is consulted for HTN, COPD, anxiety/depression.     - Ex lap w/lysis of adhesions and repair of incarcerated umbilical hernia    - c/o abd pain this am  - doing well overall   - not passing gas yet   - clear liquid diet   - on 3 LPM O2; on home O2 at same rate      - BP consistently high; pt states PO clonidine works very well for her; has been on for years     Review of systems  General: No fevers or chills. Cardiovascular: No chest pain or pressure. Pulmonary: + coughing, wheezing, mild SOB   Gastrointestinal: + Abd pain. No nausea, vomiting or diarrhea. Genitourinary: No urinary frequency, urgency, hesitancy or dysuria. Musculoskeletal: + Neck, back pain       Objective:     Physical Exam:  Visit Vitals  BP (!) 198/95 (BP 1 Location: Right arm, BP Patient Position: At rest)   Pulse 70   Temp 98.6 °F (37 °C)   Resp 18   Ht 4' 11\" (1.499 m)   Wt 87.3 kg (192 lb 7.4 oz)   SpO2 100%   Breastfeeding? No   BMI 38.87 kg/m²        General:         Alert, cooperative, no distress    HEENT: NC, Atraumatic. Anicteric sclerae. Lungs: CTA Bilaterally. No rales or rhonchi; diminished and coarse breath sounds with bilaterally  Heart:  Regular  rhythm, No murmur, No Rubs, No Gallops  Abdomen: + Tender; surgical site c/d/i; Soft, Non distended, +Bowel sounds, no HSM  Extremities: No c/c/e  Psych:   Normal affect   Neurologic:  Alert and oriented X 3. No acute neurological deficits. Intake and Output:  Current Shift:  No intake/output data recorded.   Last three shifts:  10/13 1901 - 10/15 0700  In: 2232.1 [P.O.:480; I.V.:1752.1]  Out: 2365 [Urine:2340]    Labs: Results:       Chemistry Recent Labs 10/15/19  0325 10/14/19  0754 10/13/19  1929   GLU 80 147* 143*    142 143   K 3.8 4.4 3.2*    111 109   CO2 25 23 26   BUN 14 14 14   CREA 0.95 1.14 1.15   CA 7.8* 8.3* 9.4   AGAP 5 8 8   BUCR 15 12 12   AP  --  85 99   TP  --  6.3* 7.6   ALB  --  3.2* 4.0   GLOB  --  3.1 3.6   AGRAT  --  1.0 1.1      CBC w/Diff Recent Labs     10/15/19  0325 10/14/19  0754 10/13/19  1929   WBC 9.2 17.1* 12.5   RBC 3.46* 4.18* 4.72   HGB 10.1* 12.3 14.1   HCT 31.5* 37.8 42.4    226 244   GRANS 71 91* 72   LYMPH 22 2* 23   EOS 1 0 1      Cardiac Enzymes No results for input(s): CPK, CKND1, JOSE in the last 72 hours. No lab exists for component: CKRMB, TROIP   Coagulation No results for input(s): PTP, INR, APTT, INREXT, INREXT in the last 72 hours. Lipid Panel No results found for: CHOL, CHOLPOCT, CHOLX, CHLST, CHOLV, 961128, HDL, HDLP, LDL, LDLC, DLDLP, 611882, VLDLC, VLDL, TGLX, TRIGL, TRIGP, TGLPOCT, CHHD, CHHDX   BNP No results for input(s): BNPP in the last 72 hours.    Liver Enzymes Recent Labs     10/14/19  0754   TP 6.3*   ALB 3.2*   AP 85   SGOT 9*      Thyroid Studies Lab Results   Component Value Date/Time    TSH 0.18 (L) 09/27/2019 10:50 PM          Procedures/imaging: see electronic medical records for all procedures/Xrays and details which were not copied into this note but were reviewed prior to creation of Plan    Medications:   Current Facility-Administered Medications   Medication Dose Route Frequency    amLODIPine (NORVASC) tablet 10 mg  10 mg Oral DAILY    lisinopril (PRINIVIL, ZESTRIL) tablet 20 mg  20 mg Oral DAILY    metoprolol tartrate (LOPRESSOR) tablet 12.5 mg  12.5 mg Oral Q12H    famotidine (PF) (PEPCID) 20 mg in sodium chloride 0.9% 10 mL injection  20 mg IntraVENous Q12H    sodium chloride (NS) flush 5-40 mL  5-40 mL IntraVENous Q8H    sodium chloride (NS) flush 5-40 mL  5-40 mL IntraVENous PRN    lactated Ringers infusion  125 mL/hr IntraVENous CONTINUOUS    oxyCODONE-acetaminophen (PERCOCET) 5-325 mg per tablet 2 Tab  2 Tab Oral Q4H PRN    HYDROmorphone (DILAUDID) injection 1 mg  1 mg IntraVENous Q2H PRN    naloxone (NARCAN) injection 0.4 mg  0.4 mg IntraVENous PRN    ondansetron (ZOFRAN) injection 4 mg  4 mg IntraVENous Q4H PRN    diphenhydrAMINE (BENADRYL) injection 12.5 mg  12.5 mg IntraVENous Q4H PRN    LORazepam (ATIVAN) injection 1 mg  1 mg IntraVENous Q6H PRN    heparin (porcine) injection 5,000 Units  5,000 Units SubCUTAneous Q8H    ketorolac (TORADOL) injection 15 mg  15 mg IntraVENous Q8H    labetalol (NORMODYNE;TRANDATE) 20 mg/4 mL (5 mg/mL) injection 5-10 mg  5-10 mg IntraVENous Q5MIN PRN    arformoterol (BROVANA) neb solution 15 mcg  15 mcg Nebulization BID RT    budesonide (PULMICORT) 500 mcg/2 ml nebulizer suspension  500 mcg Nebulization BID RT    cloNIDine (CATAPRES) 0.2 mg/24 hr patch 1 Patch  1 Patch TransDERmal Q7D    hydrALAZINE (APRESOLINE) 20 mg/mL injection 20 mg  20 mg IntraVENous Q6H PRN    albuterol-ipratropium (DUO-NEB) 2.5 MG-0.5 MG/3 ML  3 mL Nebulization Q4H PRN           Assessment and Plan:     Pt. Is a 53 y.o. female, admitted for incarcerated incisional hernia. Condition is Stable. 1. S/p Exploratory laparotomy with lysis of adhesions and primary repair of incarcerated umbilical hernia  2. Abdominal pain  3. HTN  4. COPD without exacerbation  5 Chronic hypoxic respiratory failure   6. Chronic pain syndrome  7. Anxiety/depression     Continue telemetry. Discontinue clonidine patch, IV lopressor  Start home Amlodipine, Lisinopril, Lopressor, Clonidine   Continue prn IV hydralazine  Continue Brovana and Pulmicort nebulizer.    Continue O2 at 3 LPM   Continue Pepcid  Continue IV ativan prn  Dr. Furman Leventhal to follow for pain and diet management     Code status: FULL   Disposition: home, >2 midnights       Case discussed with:  [x]Patient  []Family  [x]Nursing  [x]Case Management  DVT Prophylaxis:  []Lovenox  [x]Hep SQ []SCDs  []Coumadin   []On Heparin gtt      H.  Oscar Bee, DO  10/15/2019

## 2019-10-15 NOTE — PROGRESS NOTES
Mukesh Kunz M.D. FACS  PROGRESS NOTE    Name: Lakeisha Ortez MRN: [id number]   : 1965 [facility]   Date: 10/15/2019 Admission Date: 10/13/2019  6:54 PM     Hospital Day: 3  2 Days Post-Op  Subjective:  Patient without acute overnight events. Objective:  Vitals:    10/15/19 0112 10/15/19 0200 10/15/19 0550 10/15/19 0740   BP: (!) 168/91 150/89 (!) 175/92    Pulse: 93  95    Resp:   19    Temp: 97.8 °F (36.6 °C)  98.7 °F (37.1 °C)    SpO2: 94%  98%    Weight:    87.3 kg (192 lb 7.4 oz)   Height:         Date 10/14/19 0700 - 10/15/19 0659 10/15/19 0700 - 10/16/19 0659   Shift 4968-7876 6737-9011 24 Hour Total 0885-8139 2261-1222 24 Hour Total   INTAKE   Shift Total(mL/kg)         OUTPUT   Urine(mL/kg/hr)  1800(1.7) 1800(0.9)        Urine Output (mL) (Urinary Catheter 10/13/19 [place])  1800 1800      Shift Total(mL/kg)  1800(20.7) 1800(20.7)      NET  -1800 -1800      Weight (kg) 87.1 87.1 87.1 87.3 87.3 87.3         Physical Exam:    General: Awake and alert, oriented x4, no apparent distress   Abdomen: abdomen is soft with midline incisional tenderness. Incision(s) are C/D/I.   No masses, organomegaly or guarding    Labs:  Recent Results (from the past 24 hour(s))   METABOLIC PANEL, BASIC    Collection Time: 10/14/19  7:54 AM   Result Value Ref Range    Sodium 142 136 - 145 mmol/L    Potassium 4.4 3.5 - 5.5 mmol/L    Chloride 111 100 - 111 mmol/L    CO2 23 21 - 32 mmol/L    Anion gap 8 3.0 - 18 mmol/L    Glucose 147 (H) 74 - 99 mg/dL    BUN 14 7.0 - 18 MG/DL    Creatinine 1.14 0.6 - 1.3 MG/DL    BUN/Creatinine ratio 12 12 - 20      GFR est AA 60 (L) >60 ml/min/1.73m2    GFR est non-AA 49 (L) >60 ml/min/1.73m2    Calcium 8.3 (L) 8.5 - 10.1 MG/DL   HEPATIC FUNCTION PANEL    Collection Time: 10/14/19  7:54 AM   Result Value Ref Range    Protein, total 6.3 (L) 6.4 - 8.2 g/dL    Albumin 3.2 (L) 3.4 - 5.0 g/dL    Globulin 3.1 2.0 - 4.0 g/dL    A-G Ratio 1.0 0.8 - 1.7      Bilirubin, total 0.4 0.2 - 1.0 MG/DL    Bilirubin, direct 0.1 0.0 - 0.2 MG/DL    Alk. phosphatase 85 45 - 117 U/L    AST (SGOT) 9 (L) 10 - 38 U/L    ALT (SGPT) 17 13 - 56 U/L   MAGNESIUM    Collection Time: 10/14/19  7:54 AM   Result Value Ref Range    Magnesium 1.7 1.6 - 2.6 mg/dL   PHOSPHORUS    Collection Time: 10/14/19  7:54 AM   Result Value Ref Range    Phosphorus 3.6 2.5 - 4.9 MG/DL   CBC WITH AUTOMATED DIFF    Collection Time: 10/14/19  7:54 AM   Result Value Ref Range    WBC 17.1 (H) 4.6 - 13.2 K/uL    RBC 4.18 (L) 4.20 - 5.30 M/uL    HGB 12.3 12.0 - 16.0 g/dL    HCT 37.8 35.0 - 45.0 %    MCV 90.4 74.0 - 97.0 FL    MCH 29.4 24.0 - 34.0 PG    MCHC 32.5 31.0 - 37.0 g/dL    RDW 13.4 11.6 - 14.5 %    PLATELET 610 158 - 192 K/uL    MPV 10.5 9.2 - 11.8 FL    NEUTROPHILS 91 (H) 42 - 75 %    BAND NEUTROPHILS 3 0 - 5 %    LYMPHOCYTES 2 (L) 20 - 51 %    MONOCYTES 4 2 - 9 %    EOSINOPHILS 0 0 - 5 %    BASOPHILS 0 0 - 3 %    ABS. NEUTROPHILS 16.1 (H) 1.8 - 8.0 K/UL    ABS. LYMPHOCYTES 0.3 (L) 0.8 - 3.5 K/UL    ABS. MONOCYTES 0.7 0 - 1.0 K/UL    ABS. EOSINOPHILS 0.0 0.0 - 0.4 K/UL    ABS. BASOPHILS 0.0 0.0 - 0.06 K/UL    DF MANUAL      PLATELET COMMENTS ADEQUATE PLATELETS      RBC COMMENTS STOMATOCYTES  1+       CBC WITH AUTOMATED DIFF    Collection Time: 10/15/19  3:25 AM   Result Value Ref Range    WBC 9.2 4.6 - 13.2 K/uL    RBC 3.46 (L) 4.20 - 5.30 M/uL    HGB 10.1 (L) 12.0 - 16.0 g/dL    HCT 31.5 (L) 35.0 - 45.0 %    MCV 91.0 74.0 - 97.0 FL    MCH 29.2 24.0 - 34.0 PG    MCHC 32.1 31.0 - 37.0 g/dL    RDW 13.3 11.6 - 14.5 %    PLATELET 531 064 - 186 K/uL    MPV 11.1 9.2 - 11.8 FL    NEUTROPHILS 71 40 - 73 %    LYMPHOCYTES 22 21 - 52 %    MONOCYTES 6 3 - 10 %    EOSINOPHILS 1 0 - 5 %    BASOPHILS 0 0 - 2 %    ABS. NEUTROPHILS 6.5 1.8 - 8.0 K/UL    ABS. LYMPHOCYTES 2.1 0.9 - 3.6 K/UL    ABS. MONOCYTES 0.6 0.05 - 1.2 K/UL    ABS. EOSINOPHILS 0.1 0.0 - 0.4 K/UL    ABS.  BASOPHILS 0.0 0.0 - 0.1 K/UL    DF AUTOMATED     METABOLIC PANEL, BASIC    Collection Time: 10/15/19  3:25 AM   Result Value Ref Range    Sodium 139 136 - 145 mmol/L    Potassium 3.8 3.5 - 5.5 mmol/L    Chloride 109 100 - 111 mmol/L    CO2 25 21 - 32 mmol/L    Anion gap 5 3.0 - 18 mmol/L    Glucose 80 74 - 99 mg/dL    BUN 14 7.0 - 18 MG/DL    Creatinine 0.95 0.6 - 1.3 MG/DL    BUN/Creatinine ratio 15 12 - 20      GFR est AA >60 >60 ml/min/1.73m2    GFR est non-AA >60 >60 ml/min/1.73m2    Calcium 7.8 (L) 8.5 - 10.1 MG/DL     All Micro Results     None          Current Medications:  Current Facility-Administered Medications   Medication Dose Route Frequency Provider Last Rate Last Dose    famotidine (PF) (PEPCID) 20 mg in sodium chloride 0.9% 10 mL injection  20 mg IntraVENous Q12H Reyna Charlton MD   20 mg at 10/14/19 2208    sodium chloride (NS) flush 5-40 mL  5-40 mL IntraVENous Q8H Yu Diaz MD   10 mL at 10/14/19 2209    sodium chloride (NS) flush 5-40 mL  5-40 mL IntraVENous PRN Yu Diaz MD        lactated Ringers infusion  125 mL/hr IntraVENous CONTINUOUS Yu Diaz  mL/hr at 10/15/19 0549 125 mL/hr at 10/15/19 0549    oxyCODONE-acetaminophen (PERCOCET) 5-325 mg per tablet 2 Tab  2 Tab Oral Q4H PRN Yu Diaz MD   2 Tab at 10/15/19 2209    HYDROmorphone (DILAUDID) injection 1 mg  1 mg IntraVENous Q2H PRN Yu Diaz MD   1 mg at 10/15/19 4452    naloxone (NARCAN) injection 0.4 mg  0.4 mg IntraVENous PRN Yu Diaz MD        ondansetron [facility]) injection 4 mg  4 mg IntraVENous Q4H PRN Yu Diaz MD   4 mg at 10/14/19 0059    diphenhydrAMINE (BENADRYL) injection 12.5 mg  12.5 mg IntraVENous Q4H PRN Yu Diaz MD        LORazepam (ATIVAN) injection 1 mg  1 mg IntraVENous Q6H PRN Yu Diaz MD   1 mg at 10/15/19 0114    heparin (porcine) injection 5,000 Units  5,000 Units SubCUTAneous Q8H Yu Diaz MD   5,000 Units at 10/15/19 0402    ketorolac (TORADOL) injection 15 mg  15 mg IntraVENous Q8H Gerhardt Aguas, MD   15 mg at 10/15/19 0553    labetalol (NORMODYNE;TRANDATE) 20 mg/4 mL (5 mg/mL) injection 5-10 mg  5-10 mg IntraVENous Q5MIN PRN Aldo Alfaro CRNA   5 mg at 10/13/19 2348    arformoterol (BROVANA) neb solution 15 mcg  15 mcg Nebulization BID RT Riley Vergara MD   15 mcg at 10/14/19 1959    budesonide (PULMICORT) 500 mcg/2 ml nebulizer suspension  500 mcg Nebulization BID RT Riley Vergara MD   500 mcg at 10/14/19 1959    cloNIDine (CATAPRES) 0.2 mg/24 hr patch 1 Patch  1 Patch TransDERmal Q7D Riley Vergara MD   1 Patch at 10/14/19 0344    hydrALAZINE (APRESOLINE) 20 mg/mL injection 20 mg  20 mg IntraVENous Q6H PRN Riley Vergara MD        metoprolol (LOPRESSOR) injection 1.25 mg  1.25 mg IntraVENous Q4H Riley Vergara MD   1.25 mg at 10/15/19 0553    albuterol-ipratropium (DUO-NEB) 2.5 MG-0.5 MG/3 ML  3 mL Nebulization Q4H PRN Riley Vergara MD           Chart and notes reviewed. Data reviewed. I have evaluated and examined the patient. IMPRESSION:   · Patient is hemodynamically stable postop day 2 from exploratory laparotomy with lysis of adhesions for incarcerated incisional hernia. PLAN:/DISCUSION:   · Out of bed to chair, encourage incentive spirometry usage,  · SCDs to bilateral lower extremity while in bed, heparin 5000 units subcu every 8 hours,  · Pain control  · Appreciate hospitalist management of hypertension and medical problems.         Sivan Esparza MD

## 2019-10-15 NOTE — PROGRESS NOTES
conducted an initial consultation and Spiritual Assessment for Sharri Sanabria, who is a 53 y.o.,female. Patients Primary Language is: [place]. According to the patients EMR Anabaptism Affiliation is: Clementina Chan. The reason the Patient came to the hospital is:   Patient Active Problem List    Diagnosis Date Noted    Incarcerated incisional hernia 10/13/2019    COPD (chronic obstructive pulmonary disease) ([address] 09/28/2019    Hypertensive urgency 09/28/2019    Tobacco use 09/28/2019    Acute bronchitis with chronic obstructive pulmonary disease (COPD) ([address] 03/09/2019    T wave inversion in EKG 03/09/2019    Hypertensive heart failure ([address] 12/19/2017    Sleep apnea 12/19/2017    COPD (chronic obstructive pulmonary disease) with chronic bronchitis ([address] 12/19/2017    Acute asthma exacerbation 04/21/2017    Essential hypertension 03/10/2017    Morbid obesity due to excess calories ([address] 03/10/2017    COPD with acute exacerbation ([address] 03/02/2017    Acute respiratory failure with hypercapnia (HCC) 02/26/2017    Asthma exacerbation 01/26/2017    Respiratory failure ([address] 01/11/2017    Acute encephalopathy 01/11/2017    Acute exacerbation of chronic obstructive pulmonary disease (COPD) ([address] 08/29/2016    Acute on chronic respiratory failure with hypoxemia (HCC) 03/12/2016    Asthma 03/05/2016    Abnormal CT of the chest 11/20/2015    Asthma exacerbation attacks 10/19/2015    Status asthmaticus with COPD (chronic obstructive pulmonary disease) ([address] 10/19/2015        The  provided the following Interventions:  Initiated a relationship of care and support. Explored issues of marilee, belief, spirituality and Religion/ritual needs while hospitalized. Listened empathically. Provided chaplaincy education. Provided information about Spiritual Care Services. Offered prayer and assurance of continued prayers on patient's behalf. Chart reviewed.     The following outcomes where achieved:  Patient shared limited information about both their medical narrative and spiritual journey/beliefs. Patient processed feeling about current hospitalization. Patient expressed gratitude for 's visit. Assessment:  Patient does not have any Presybeterian/cultural needs that will affect patients preferences in health care. There are no spiritual or Presybeterian issues which require intervention at this time. Plan:  Chaplains will continue to follow and will provide pastoral care on an as needed/requested basis.  recommends bedside caregivers page  on duty if patient shows signs of acute spiritual or emotional distress.     [id number] [facility]   [phone]

## 2019-10-15 NOTE — PROGRESS NOTES
Problem: Surgical Pathway Day of Surgery  Goal: Off Pathway (Use only if patient is Off Pathway)  Outcome: Progressing Towards Goal  Goal: Activity/Safety  Outcome: Progressing Towards Goal  Goal: Consults, if ordered  Outcome: Progressing Towards Goal  Goal: Nutrition/Diet  Outcome: Progressing Towards Goal  Goal: Medications  Outcome: Progressing Towards Goal  Goal: Respiratory  Outcome: Progressing Towards Goal  Goal: Treatments/Interventions/Procedures  Outcome: Progressing Towards Goal  Goal: Psychosocial  Outcome: Progressing Towards Goal  Goal: *No signs and symptoms of infection or wound complications  Outcome: Progressing Towards Goal  Goal: *Optimal pain control at patient's stated goal  Outcome: Progressing Towards Goal  Goal: *Adequate urinary output (equal to or greater than 30 milliliters/hour)  Description  Ambulatory Surgery patients voiding without difficulty.   Outcome: Progressing Towards Goal  Goal: *Hemodynamically stable  Outcome: Progressing Towards Goal  Goal: *Tolerating diet  Outcome: Progressing Towards Goal  Goal: *Demonstrates progressive activity  Outcome: Progressing Towards Goal     Problem: Surgical Pathway Post-Op Day 1  Goal: Off Pathway (Use only if patient is Off Pathway)  Outcome: Progressing Towards Goal  Goal: Activity/Safety  Outcome: Progressing Towards Goal  Goal: Diagnostic Test/Procedures  Outcome: Progressing Towards Goal  Goal: Nutrition/Diet  Outcome: Progressing Towards Goal  Goal: Discharge Planning  Outcome: Progressing Towards Goal  Goal: Medications  Outcome: Progressing Towards Goal  Goal: Respiratory  Outcome: Progressing Towards Goal  Goal: Treatments/Interventions/Procedures  Outcome: Progressing Towards Goal  Goal: Psychosocial  Outcome: Progressing Towards Goal  Goal: *No signs and symptoms of infection or wound complications  Outcome: Progressing Towards Goal  Goal: *Optimal pain control at patient's stated goal  Outcome: Progressing Towards Goal  Goal: *Adequate urinary output (equal to or greater than 30 milliliters/hour)  Outcome: Progressing Towards Goal  Goal: *Hemodynamically stable  Outcome: Progressing Towards Goal  Goal: *Tolerating diet  Outcome: Progressing Towards Goal  Goal: *Demonstrates progressive activity  Outcome: Progressing Towards Goal  Goal: *Lungs clear or at baseline  Outcome: Progressing Towards Goal     Problem: Surgical Pathway Post-Op Day 2 through Discharge  Goal: Off Pathway (Use only if patient is Off Pathway)  Outcome: Progressing Towards Goal  Goal: Activity/Safety  Outcome: Progressing Towards Goal  Goal: Nutrition/Diet  Outcome: Progressing Towards Goal  Goal: Discharge Planning  Outcome: Progressing Towards Goal  Goal: Medications  Outcome: Progressing Towards Goal  Goal: Respiratory  Outcome: Progressing Towards Goal  Goal: Treatments/Interventions/Procedures  Outcome: Progressing Towards Goal  Goal: Psychosocial  Outcome: Progressing Towards Goal  Goal: *No signs and symptoms of infection or wound complications  Outcome: Progressing Towards Goal  Goal: *Optimal pain control at patient's stated goal  Outcome: Progressing Towards Goal  Goal: *Adequate urinary output (equal to or greater than 30 milliliters/hour)  Outcome: Progressing Towards Goal  Goal: *Hemodynamically stable  Outcome: Progressing Towards Goal  Goal: *Tolerating diet  Outcome: Progressing Towards Goal  Goal: *Demonstrates progressive activity  Outcome: Progressing Towards Goal  Goal: *Lungs clear or at baseline  Outcome: Progressing Towards Goal     Problem: Surgical Pathway: Discharge Outcomes  Goal: *Hemodynamically stable  Outcome: Progressing Towards Goal  Goal: *Lungs clear or at baseline  Outcome: Progressing Towards Goal  Goal: *Demonstrates independent activity or return to baseline  Outcome: Progressing Towards Goal  Goal: *Optimal pain control at patient's stated goal  Outcome: Progressing Towards Goal  Goal: *Verbalizes understanding and describes prescribed diet  Outcome: Progressing Towards Goal  Goal: *Tolerating diet  Outcome: Progressing Towards Goal  Goal: *Verbalizes name, dosage, time, side effects, and number of days to continue medications  Outcome: Progressing Towards Goal  Goal: *No signs and symptoms of infection or wound complications  Outcome: Progressing Towards Goal  Goal: *Anxiety reduced or absent  Outcome: Progressing Towards Goal  Goal: *Understands and describes signs and symptoms to report to providers(Stroke Metric)  Outcome: Progressing Towards Goal  Goal: *Describes follow-up/return visits to physicians  Outcome: Progressing Towards Goal  Goal: *Describes available resources and support systems  Outcome: Progressing Towards Goal     Problem: Pain  Goal: *Control of Pain  Outcome: Progressing Towards Goal  Goal: *PALLIATIVE CARE:  Alleviation of Pain  Outcome: Progressing Towards Goal     Problem: Falls - Risk of  Goal: *Absence of Falls  Description  Document [facility] Fall Risk and appropriate interventions in the flowsheet.   Outcome: Progressing Towards Goal  Note:   Fall Risk Interventions:  Mobility Interventions: Assess mobility with egress test         Medication Interventions: Bed/chair exit alarm    Elimination Interventions: Bed/chair exit alarm

## 2019-10-16 LAB
ANION GAP SERPL CALC-SCNC: 4 MMOL/L (ref 3–18)
BASOPHILS # BLD: 0 K/UL (ref 0–0.1)
BASOPHILS NFR BLD: 0 % (ref 0–2)
BUN SERPL-MCNC: 8 MG/DL (ref 7–18)
BUN/CREAT SERPL: 11 (ref 12–20)
CALCIUM SERPL-MCNC: 7.9 MG/DL (ref 8.5–10.1)
CHLORIDE SERPL-SCNC: 108 MMOL/L (ref 100–111)
CO2 SERPL-SCNC: 28 MMOL/L (ref 21–32)
CREAT SERPL-MCNC: 0.74 MG/DL (ref 0.6–1.3)
DIFFERENTIAL METHOD BLD: ABNORMAL
EOSINOPHIL # BLD: 0.1 K/UL (ref 0–0.4)
EOSINOPHIL NFR BLD: 1 % (ref 0–5)
ERYTHROCYTE [DISTWIDTH] IN BLOOD BY AUTOMATED COUNT: 13.2 % (ref 11.6–14.5)
GLUCOSE SERPL-MCNC: 81 MG/DL (ref 74–99)
HCT VFR BLD AUTO: 30.2 % (ref 35–45)
HGB BLD-MCNC: 9.6 G/DL (ref 12–16)
LYMPHOCYTES # BLD: 1.6 K/UL (ref 0.9–3.6)
LYMPHOCYTES NFR BLD: 29 % (ref 21–52)
MCH RBC QN AUTO: 29.1 PG (ref 24–34)
MCHC RBC AUTO-ENTMCNC: 31.8 G/DL (ref 31–37)
MCV RBC AUTO: 91.5 FL (ref 74–97)
MONOCYTES # BLD: 0.3 K/UL (ref 0.05–1.2)
MONOCYTES NFR BLD: 5 % (ref 3–10)
NEUTS SEG # BLD: 3.6 K/UL (ref 1.8–8)
NEUTS SEG NFR BLD: 65 % (ref 40–73)
PLATELET # BLD AUTO: 156 K/UL (ref 135–420)
PMV BLD AUTO: 10.7 FL (ref 9.2–11.8)
POTASSIUM SERPL-SCNC: 3.5 MMOL/L (ref 3.5–5.5)
RBC # BLD AUTO: 3.3 M/UL (ref 4.2–5.3)
SODIUM SERPL-SCNC: 140 MMOL/L (ref 136–145)
WBC # BLD AUTO: 5.6 K/UL (ref 4.6–13.2)

## 2019-10-16 PROCEDURE — 74011250636 HC RX REV CODE- 250/636: Performed by: FAMILY MEDICINE

## 2019-10-16 PROCEDURE — 97605 NEG PRS WND THER DME<=50SQCM: CPT

## 2019-10-16 PROCEDURE — 85025 COMPLETE CBC W/AUTO DIFF WBC: CPT

## 2019-10-16 PROCEDURE — 94640 AIRWAY INHALATION TREATMENT: CPT

## 2019-10-16 PROCEDURE — 74011250636 HC RX REV CODE- 250/636: Performed by: INTERNAL MEDICINE

## 2019-10-16 PROCEDURE — 74011250637 HC RX REV CODE- 250/637: Performed by: SURGERY

## 2019-10-16 PROCEDURE — 36415 COLL VENOUS BLD VENIPUNCTURE: CPT

## 2019-10-16 PROCEDURE — 74011250637 HC RX REV CODE- 250/637: Performed by: FAMILY MEDICINE

## 2019-10-16 PROCEDURE — 94761 N-INVAS EAR/PLS OXIMETRY MLT: CPT

## 2019-10-16 PROCEDURE — 80048 BASIC METABOLIC PNL TOTAL CA: CPT

## 2019-10-16 PROCEDURE — 74011250636 HC RX REV CODE- 250/636: Performed by: SURGERY

## 2019-10-16 PROCEDURE — 94760 N-INVAS EAR/PLS OXIMETRY 1: CPT

## 2019-10-16 PROCEDURE — 74011000250 HC RX REV CODE- 250: Performed by: INTERNAL MEDICINE

## 2019-10-16 PROCEDURE — 65270000029 HC RM PRIVATE

## 2019-10-16 RX ORDER — MINERAL OIL
30 OIL (ML) ORAL DAILY
Status: DISCONTINUED | OUTPATIENT
Start: 2019-10-16 | End: 2019-10-17 | Stop reason: HOSPADM

## 2019-10-16 RX ORDER — DOCUSATE SODIUM 100 MG/1
100 CAPSULE, LIQUID FILLED ORAL DAILY
Status: DISCONTINUED | OUTPATIENT
Start: 2019-10-16 | End: 2019-10-17 | Stop reason: HOSPADM

## 2019-10-16 RX ORDER — BISACODYL 5 MG
5 TABLET, DELAYED RELEASE (ENTERIC COATED) ORAL DAILY
Status: DISCONTINUED | OUTPATIENT
Start: 2019-10-16 | End: 2019-10-17 | Stop reason: HOSPADM

## 2019-10-16 RX ADMIN — HEPARIN SODIUM 5000 UNITS: 5000 INJECTION INTRAVENOUS; SUBCUTANEOUS at 05:04

## 2019-10-16 RX ADMIN — SODIUM CHLORIDE, SODIUM LACTATE, POTASSIUM CHLORIDE, AND CALCIUM CHLORIDE 75 ML/HR: 600; 310; 30; 20 INJECTION, SOLUTION INTRAVENOUS at 06:50

## 2019-10-16 RX ADMIN — HYDROMORPHONE HYDROCHLORIDE 1 MG: 1 INJECTION, SOLUTION INTRAMUSCULAR; INTRAVENOUS; SUBCUTANEOUS at 11:58

## 2019-10-16 RX ADMIN — OXYCODONE HYDROCHLORIDE AND ACETAMINOPHEN 2 TABLET: 5; 325 TABLET ORAL at 01:02

## 2019-10-16 RX ADMIN — MINERAL OIL 30 ML: 1000 SOLUTION ORAL at 09:50

## 2019-10-16 RX ADMIN — HYDROMORPHONE HYDROCHLORIDE 1 MG: 1 INJECTION, SOLUTION INTRAMUSCULAR; INTRAVENOUS; SUBCUTANEOUS at 20:16

## 2019-10-16 RX ADMIN — BUDESONIDE 500 MCG: 0.5 INHALANT RESPIRATORY (INHALATION) at 20:00

## 2019-10-16 RX ADMIN — Medication 10 ML: at 01:10

## 2019-10-16 RX ADMIN — ARFORMOTEROL TARTRATE 15 MCG: 15 SOLUTION RESPIRATORY (INHALATION) at 20:47

## 2019-10-16 RX ADMIN — METOPROLOL TARTRATE 12.5 MG: 25 TABLET ORAL at 22:03

## 2019-10-16 RX ADMIN — HYDROMORPHONE HYDROCHLORIDE 1 MG: 1 INJECTION, SOLUTION INTRAMUSCULAR; INTRAVENOUS; SUBCUTANEOUS at 23:29

## 2019-10-16 RX ADMIN — KETOROLAC TROMETHAMINE 15 MG: 15 INJECTION, SOLUTION INTRAMUSCULAR; INTRAVENOUS at 06:46

## 2019-10-16 RX ADMIN — ARFORMOTEROL TARTRATE 15 MCG: 15 SOLUTION RESPIRATORY (INHALATION) at 07:57

## 2019-10-16 RX ADMIN — FAMOTIDINE 20 MG: 10 INJECTION INTRAVENOUS at 08:31

## 2019-10-16 RX ADMIN — HYDRALAZINE HYDROCHLORIDE 20 MG: 20 INJECTION INTRAMUSCULAR; INTRAVENOUS at 16:43

## 2019-10-16 RX ADMIN — Medication 10 ML: at 06:50

## 2019-10-16 RX ADMIN — CLONIDINE HYDROCHLORIDE 0.2 MG: 0.1 TABLET ORAL at 08:29

## 2019-10-16 RX ADMIN — KETOROLAC TROMETHAMINE 15 MG: 15 INJECTION, SOLUTION INTRAMUSCULAR; INTRAVENOUS at 22:04

## 2019-10-16 RX ADMIN — HEPARIN SODIUM 5000 UNITS: 5000 INJECTION INTRAVENOUS; SUBCUTANEOUS at 11:58

## 2019-10-16 RX ADMIN — Medication 10 ML: at 13:32

## 2019-10-16 RX ADMIN — CLONIDINE HYDROCHLORIDE 0.2 MG: 0.1 TABLET ORAL at 18:15

## 2019-10-16 RX ADMIN — AMLODIPINE BESYLATE 10 MG: 10 TABLET ORAL at 08:24

## 2019-10-16 RX ADMIN — HYDROMORPHONE HYDROCHLORIDE 1 MG: 1 INJECTION, SOLUTION INTRAMUSCULAR; INTRAVENOUS; SUBCUTANEOUS at 08:24

## 2019-10-16 RX ADMIN — METOPROLOL TARTRATE 12.5 MG: 25 TABLET ORAL at 08:29

## 2019-10-16 RX ADMIN — HYDROMORPHONE HYDROCHLORIDE 1 MG: 1 INJECTION, SOLUTION INTRAMUSCULAR; INTRAVENOUS; SUBCUTANEOUS at 15:44

## 2019-10-16 RX ADMIN — HYDROMORPHONE HYDROCHLORIDE 1 MG: 1 INJECTION, SOLUTION INTRAMUSCULAR; INTRAVENOUS; SUBCUTANEOUS at 18:13

## 2019-10-16 RX ADMIN — HEPARIN SODIUM 5000 UNITS: 5000 INJECTION INTRAVENOUS; SUBCUTANEOUS at 22:04

## 2019-10-16 RX ADMIN — DOCUSATE SODIUM 100 MG: 100 CAPSULE, LIQUID FILLED ORAL at 11:58

## 2019-10-16 RX ADMIN — FAMOTIDINE 20 MG: 10 INJECTION INTRAVENOUS at 20:20

## 2019-10-16 RX ADMIN — HYDROMORPHONE HYDROCHLORIDE 1 MG: 1 INJECTION, SOLUTION INTRAMUSCULAR; INTRAVENOUS; SUBCUTANEOUS at 02:53

## 2019-10-16 RX ADMIN — HYDROMORPHONE HYDROCHLORIDE 1 MG: 1 INJECTION, SOLUTION INTRAMUSCULAR; INTRAVENOUS; SUBCUTANEOUS at 04:58

## 2019-10-16 RX ADMIN — BUDESONIDE 500 MCG: 0.5 INHALANT RESPIRATORY (INHALATION) at 07:57

## 2019-10-16 RX ADMIN — OXYCODONE HYDROCHLORIDE AND ACETAMINOPHEN 2 TABLET: 5; 325 TABLET ORAL at 14:46

## 2019-10-16 RX ADMIN — KETOROLAC TROMETHAMINE 15 MG: 15 INJECTION, SOLUTION INTRAMUSCULAR; INTRAVENOUS at 13:32

## 2019-10-16 RX ADMIN — OXYCODONE HYDROCHLORIDE AND ACETAMINOPHEN 2 TABLET: 5; 325 TABLET ORAL at 09:50

## 2019-10-16 RX ADMIN — LISINOPRIL 20 MG: 20 TABLET ORAL at 08:29

## 2019-10-16 RX ADMIN — BISACODYL 5 MG: 5 TABLET, COATED ORAL at 09:50

## 2019-10-16 NOTE — ROUTINE PROCESS
Received report from [facility]. Pt AAOx3, NAD, breathing non labored, on room air, HOB up. IV site clean, dry and intact. Wound vac to abdominal incision in place. Bed at the lowest level on lock position, call bell w/i reach. Bedside and Verbal shift change report given to ROSALINE Bray (oncoming nurse) by me (offgoing nurse).  Report included the following information SBAR, Kardex, Procedure Summary, Intake/Output, MAR, Recent Results and Cardiac Rhythm SR.

## 2019-10-16 NOTE — ROUTINE PROCESS
Bedside and Verbal shift change report given to Kristi Coronel RN (oncoming nurse) by Jeanna Justice RN (offgoing nurse). Report included the following information SBAR, Kardex, OR Summary, Procedure Summary, Intake/Output, MAR, Recent Results and Cardiac Rhythm NSR.

## 2019-10-16 NOTE — PROGRESS NOTES
Progress Note         Patient: Mike Primrose MRN: [id number]  CSN: [id number]    YOB: 1965  Age: 53 y.o. Sex: female    DOA: 10/13/2019 LOS:  LOS: 3 days                    Subjective:     Mike Primrose is a 53 y.o. female is admitted for incarcerated incisional hernia. Hospitalist group is consulted for HTN, COPD, anxiety/depression.     - Ex lap w/lysis of adhesions and repair of incarcerated incisional hernia    - c/o abd pain this am  - doing well overall   - not passing gas yet   - clear liquid diet   - on 3 LPM O2; on home O2 at same rate      - BP still elevated - but improved from yesterday     Review of systems  General: No fevers or chills. Cardiovascular: No chest pain or pressure. Pulmonary: + coughing, wheezing, mild SOB   Gastrointestinal: + Abd pain. No nausea, vomiting or diarrhea. Genitourinary: No urinary frequency, urgency, hesitancy or dysuria. Musculoskeletal: + Neck, back pain       Objective:     Physical Exam:  Visit Vitals  /85 (BP 1 Location: Left arm, BP Patient Position: At rest)   Pulse 63   Temp 97 °F (36.1 °C)   Resp 18   Ht 4' 11\" (1.499 m)   Wt 89.2 kg (196 lb 9.6 oz)   SpO2 97%   Breastfeeding? No   BMI 39.71 kg/m²        General:         Alert, cooperative, no distress    HEENT: NC, Atraumatic. Anicteric sclerae. Lungs: CTA Bilaterally. No rales or rhonchi; diminished and coarse breath sounds with bilaterally  Heart:  Regular  rhythm, No murmur, No Rubs, No Gallops  Abdomen: + Tender; surgical site c/d/i; Soft, Non distended, +Bowel sounds, no HSM  Extremities: No c/c/e  Psych:   Normal affect   Neurologic:  Alert and oriented X 3. No acute neurological deficits.      Intake and Output:  Current Shift:  10/16 0701 - 10/16 1900  In: -   Out: 916 [Urine:675]  Last three shifts:  10/14 1901 - 10/16 0700  In: 2115 [P.O.:2115]  Out: 2800 [Urine:2800]    Labs: Results:       Chemistry Recent Labs     10/16/19  0321 10/15/19  0325 10/14/19  0755 10/13/19  1929   GLU 81 80 147* 143*    139 142 143   K 3.5 3.8 4.4 3.2*    109 111 109   CO2 28 25 23 26   BUN 8 14 14 14   CREA 0.74 0.95 1.14 1.15   CA 7.9* 7.8* 8.3* 9.4   AGAP 4 5 8 8   BUCR 11* 15 12 12   AP  --   --  85 99   TP  --   --  6.3* 7.6   ALB  --   --  3.2* 4.0   GLOB  --   --  3.1 3.6   AGRAT  --   --  1.0 1.1      CBC w/Diff Recent Labs     10/16/19  0321 10/15/19  0325 10/14/19  0754   WBC 5.6 9.2 17.1*   RBC 3.30* 3.46* 4.18*   HGB 9.6* 10.1* 12.3   HCT 30.2* 31.5* 37.8    175 226   GRANS 65 71 91*   LYMPH 29 22 2*   EOS 1 1 0      Cardiac Enzymes No results for input(s): CPK, CKND1, JOSE in the last 72 hours. No lab exists for component: CKRMB, TROIP   Coagulation No results for input(s): PTP, INR, APTT, INREXT, INREXT in the last 72 hours. Lipid Panel No results found for: CHOL, CHOLPOCT, CHOLX, CHLST, CHOLV, 580135, HDL, HDLP, LDL, LDLC, DLDLP, 256080, VLDLC, VLDL, TGLX, TRIGL, TRIGP, TGLPOCT, CHHD, CHHDX   BNP No results for input(s): BNPP in the last 72 hours.    Liver Enzymes Recent Labs     10/14/19  0754   TP 6.3*   ALB 3.2*   AP 85   SGOT 9*      Thyroid Studies Lab Results   Component Value Date/Time    TSH 0.18 (L) 09/27/2019 10:50 PM          Procedures/imaging: see electronic medical records for all procedures/Xrays and details which were not copied into this note but were reviewed prior to creation of Plan    Medications:   Current Facility-Administered Medications   Medication Dose Route Frequency    mineral oil 30 mL  30 mL Oral DAILY    bisacodyl (DULCOLAX) tablet 5 mg  5 mg Oral DAILY    docusate sodium (COLACE) capsule 100 mg  100 mg Oral DAILY    amLODIPine (NORVASC) tablet 10 mg  10 mg Oral DAILY    lisinopril (PRINIVIL, ZESTRIL) tablet 20 mg  20 mg Oral DAILY    metoprolol tartrate (LOPRESSOR) tablet 12.5 mg  12.5 mg Oral Q12H    cloNIDine HCl (CATAPRES) tablet 0.2 mg  0.2 mg Oral BID    famotidine (PF) (PEPCID) 20 mg in sodium chloride 0.9% 10 mL injection  20 mg IntraVENous Q12H    sodium chloride (NS) flush 5-40 mL  5-40 mL IntraVENous Q8H    sodium chloride (NS) flush 5-40 mL  5-40 mL IntraVENous PRN    lactated Ringers infusion  75 mL/hr IntraVENous CONTINUOUS    oxyCODONE-acetaminophen (PERCOCET) 5-325 mg per tablet 2 Tab  2 Tab Oral Q4H PRN    HYDROmorphone (DILAUDID) injection 1 mg  1 mg IntraVENous Q2H PRN    naloxone (NARCAN) injection 0.4 mg  0.4 mg IntraVENous PRN    ondansetron (ZOFRAN) injection 4 mg  4 mg IntraVENous Q4H PRN    diphenhydrAMINE (BENADRYL) injection 12.5 mg  12.5 mg IntraVENous Q4H PRN    LORazepam (ATIVAN) injection 1 mg  1 mg IntraVENous Q6H PRN    heparin (porcine) injection 5,000 Units  5,000 Units SubCUTAneous Q8H    ketorolac (TORADOL) injection 15 mg  15 mg IntraVENous Q8H    labetalol (NORMODYNE;TRANDATE) 20 mg/4 mL (5 mg/mL) injection 5-10 mg  5-10 mg IntraVENous Q5MIN PRN    arformoterol (BROVANA) neb solution 15 mcg  15 mcg Nebulization BID RT    budesonide (PULMICORT) 500 mcg/2 ml nebulizer suspension  500 mcg Nebulization BID RT    hydrALAZINE (APRESOLINE) 20 mg/mL injection 20 mg  20 mg IntraVENous Q6H PRN    albuterol-ipratropium (DUO-NEB) 2.5 MG-0.5 MG/3 ML  3 mL Nebulization Q4H PRN           Assessment and Plan:     Pt. Is a 53 y.o. female, admitted for incarcerated incisional hernia. Condition is Stable. 1. S/p Exploratory laparotomy with lysis of adhesions and primary repair of incarcerated umbilical hernia  2. Abdominal pain  3. HTN  4. COPD without exacerbation  5 Chronic hypoxic respiratory failure   6. Chronic pain syndrome  7. Anxiety/depression     Continue telemetry. Discontinue clonidine patch, IV lopressor  Start home Amlodipine, Lisinopril, Lopressor, Clonidine   Continue prn IV hydralazine  Continue Brovana and Pulmicort nebulizer.    Continue O2 at 3 LPM   Continue Pepcid  Continue IV ativan prn  Dr. Gabriella Andrew to follow for pain and diet management     Code status: FULL Disposition: home, >2 midnight       Case discussed with:  [x]Patient  []Family  [x]Nursing  [x]Case Management  DVT Prophylaxis:  []Lovenox  [x]Hep SQ  []SCDs  []Coumadin   []On Heparin gtt      Carmelina Paris MD  10/16/2019

## 2019-10-16 NOTE — PROGRESS NOTES
PT orders received, chart reviewed. Pt unable to participate with PT due to:  []  Nausea/vomiting  []  Eating  []  Pain  []  Pt lethargic  []  Off Unit  [x]  Pt refused; educated on importance of mobility and request from nursing/MD calling to office for therapy. Pt stated multiple times \"Can we do this tomorrow? \". Pt reports she has already been up including walking in the hallway. Pt does not want to work with therapy today but did states she may need the walker but might not tomorrow. []  Other   Will f/u tomorrow as patient's schedule allows.  Thank you for this referral.  Nate Robert, PT, DPT

## 2019-10-16 NOTE — PROGRESS NOTES
Discharge planning    Reviewed chart. Patient stated \" I'm going home when the doctor discharge me. \" CM will continue monitor for transitional needs.     CHARITO LakhaniN, RN  Pager # [phone]  Care Manager
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
## 2019-10-16 NOTE — PROGRESS NOTES
Bedside shift change report given to Irina (oncoming nurse) by Elder Cevallos (offgoing nurse). Report included the following information SBAR, Kardex, Procedure Summary, MAR and Recent Results.

## 2019-10-16 NOTE — ROUTINE PROCESS
Bedside and Verbal shift change report given to Elisa Gusman RN (oncoming nurse) by Stacey Dunham RN (offgoing nurse). Report included the following information SBAR, Kardex, MAR and Recent Results.

## 2019-10-16 NOTE — PROGRESS NOTES
Page out to Charan Zavala, wound nurse. Patient wants to see her. Patient pt has a lot of questions for her concerning her wound vac.

## 2019-10-16 NOTE — WOUND CARE
Physical Exam  
Room 463: placed Prevena 7 day incisional wound vac to mid abdomen incision. Education provided to pt but she appears sleepy. Instructional booklet provided to pt. Will turn over care to nursing staff at this time. Dressing can stay in place for 7 days & be removed on 10-23-19.  
Hugo MCNEILLN, RN, [facility], [address]

## 2019-10-17 ENCOUNTER — HOME HEALTH ADMISSION (OUTPATIENT)
Dept: HOME HEALTH SERVICES | Facility: HOME HEALTH | Age: 54
End: 2019-10-17

## 2019-10-17 VITALS
SYSTOLIC BLOOD PRESSURE: 120 MMHG | TEMPERATURE: 97.4 F | WEIGHT: 196.6 LBS | OXYGEN SATURATION: 99 % | HEART RATE: 64 BPM | DIASTOLIC BLOOD PRESSURE: 86 MMHG | HEIGHT: 59 IN | BODY MASS INDEX: 39.64 KG/M2 | RESPIRATION RATE: 20 BRPM

## 2019-10-17 LAB
ANION GAP SERPL CALC-SCNC: 5 MMOL/L (ref 3–18)
BASOPHILS # BLD: 0 K/UL (ref 0–0.1)
BASOPHILS NFR BLD: 0 % (ref 0–2)
BUN SERPL-MCNC: 7 MG/DL (ref 7–18)
BUN/CREAT SERPL: 8 (ref 12–20)
CALCIUM SERPL-MCNC: 8.6 MG/DL (ref 8.5–10.1)
CHLORIDE SERPL-SCNC: 105 MMOL/L (ref 100–111)
CO2 SERPL-SCNC: 28 MMOL/L (ref 21–32)
CREAT SERPL-MCNC: 0.83 MG/DL (ref 0.6–1.3)
DIFFERENTIAL METHOD BLD: ABNORMAL
EOSINOPHIL # BLD: 0.1 K/UL (ref 0–0.4)
EOSINOPHIL NFR BLD: 2 % (ref 0–5)
ERYTHROCYTE [DISTWIDTH] IN BLOOD BY AUTOMATED COUNT: 12.8 % (ref 11.6–14.5)
GLUCOSE SERPL-MCNC: 86 MG/DL (ref 74–99)
HCT VFR BLD AUTO: 33 % (ref 35–45)
HGB BLD-MCNC: 10.6 G/DL (ref 12–16)
LYMPHOCYTES # BLD: 1.4 K/UL (ref 0.9–3.6)
LYMPHOCYTES NFR BLD: 24 % (ref 21–52)
MCH RBC QN AUTO: 28.6 PG (ref 24–34)
MCHC RBC AUTO-ENTMCNC: 32.1 G/DL (ref 31–37)
MCV RBC AUTO: 89.2 FL (ref 74–97)
MONOCYTES # BLD: 0.3 K/UL (ref 0.05–1.2)
MONOCYTES NFR BLD: 5 % (ref 3–10)
NEUTS SEG # BLD: 4 K/UL (ref 1.8–8)
NEUTS SEG NFR BLD: 69 % (ref 40–73)
PLATELET # BLD AUTO: 170 K/UL (ref 135–420)
PMV BLD AUTO: 11 FL (ref 9.2–11.8)
POTASSIUM SERPL-SCNC: 3.5 MMOL/L (ref 3.5–5.5)
RBC # BLD AUTO: 3.7 M/UL (ref 4.2–5.3)
SODIUM SERPL-SCNC: 138 MMOL/L (ref 136–145)
WBC # BLD AUTO: 5.8 K/UL (ref 4.6–13.2)

## 2019-10-17 PROCEDURE — 80048 BASIC METABOLIC PNL TOTAL CA: CPT

## 2019-10-17 PROCEDURE — 74011250637 HC RX REV CODE- 250/637: Performed by: FAMILY MEDICINE

## 2019-10-17 PROCEDURE — 74011000250 HC RX REV CODE- 250: Performed by: INTERNAL MEDICINE

## 2019-10-17 PROCEDURE — 74011250636 HC RX REV CODE- 250/636: Performed by: SURGERY

## 2019-10-17 PROCEDURE — 97116 GAIT TRAINING THERAPY: CPT

## 2019-10-17 PROCEDURE — 36415 COLL VENOUS BLD VENIPUNCTURE: CPT

## 2019-10-17 PROCEDURE — 85025 COMPLETE CBC W/AUTO DIFF WBC: CPT

## 2019-10-17 PROCEDURE — 94761 N-INVAS EAR/PLS OXIMETRY MLT: CPT

## 2019-10-17 PROCEDURE — 94640 AIRWAY INHALATION TREATMENT: CPT

## 2019-10-17 PROCEDURE — 97161 PT EVAL LOW COMPLEX 20 MIN: CPT

## 2019-10-17 PROCEDURE — 74011250637 HC RX REV CODE- 250/637: Performed by: SURGERY

## 2019-10-17 PROCEDURE — 74011250636 HC RX REV CODE- 250/636: Performed by: INTERNAL MEDICINE

## 2019-10-17 RX ORDER — OXYCODONE AND ACETAMINOPHEN 5; 325 MG/1; MG/1
1 TABLET ORAL
Qty: 25 TAB | Refills: 0 | Status: SHIPPED | OUTPATIENT
Start: 2019-10-17 | End: 2019-10-23 | Stop reason: SDUPTHER

## 2019-10-17 RX ORDER — ONDANSETRON 4 MG/1
4 TABLET, ORALLY DISINTEGRATING ORAL
Qty: 20 TAB | Refills: 0 | Status: ON HOLD | OUTPATIENT
Start: 2019-10-17 | End: 2020-03-26 | Stop reason: SDUPTHER

## 2019-10-17 RX ORDER — IBUPROFEN 800 MG/1
800 TABLET ORAL
Qty: 40 TAB | Refills: 0 | Status: SHIPPED | OUTPATIENT
Start: 2019-10-17 | End: 2020-03-26

## 2019-10-17 RX ORDER — DOCUSATE SODIUM 100 MG/1
100 CAPSULE, LIQUID FILLED ORAL 2 TIMES DAILY
Qty: 120 CAP | Refills: 0 | Status: SHIPPED | OUTPATIENT
Start: 2019-10-17 | End: 2020-12-01

## 2019-10-17 RX ADMIN — LISINOPRIL 20 MG: 20 TABLET ORAL at 09:22

## 2019-10-17 RX ADMIN — HEPARIN SODIUM 5000 UNITS: 5000 INJECTION INTRAVENOUS; SUBCUTANEOUS at 05:56

## 2019-10-17 RX ADMIN — DOCUSATE SODIUM 100 MG: 100 CAPSULE, LIQUID FILLED ORAL at 09:21

## 2019-10-17 RX ADMIN — OXYCODONE HYDROCHLORIDE AND ACETAMINOPHEN 2 TABLET: 5; 325 TABLET ORAL at 10:32

## 2019-10-17 RX ADMIN — HEPARIN SODIUM 5000 UNITS: 5000 INJECTION INTRAVENOUS; SUBCUTANEOUS at 12:12

## 2019-10-17 RX ADMIN — ARFORMOTEROL TARTRATE 15 MCG: 15 SOLUTION RESPIRATORY (INHALATION) at 07:40

## 2019-10-17 RX ADMIN — CLONIDINE HYDROCHLORIDE 0.2 MG: 0.1 TABLET ORAL at 09:22

## 2019-10-17 RX ADMIN — HYDROMORPHONE HYDROCHLORIDE 1 MG: 1 INJECTION, SOLUTION INTRAMUSCULAR; INTRAVENOUS; SUBCUTANEOUS at 03:27

## 2019-10-17 RX ADMIN — KETOROLAC TROMETHAMINE 15 MG: 15 INJECTION, SOLUTION INTRAMUSCULAR; INTRAVENOUS at 07:26

## 2019-10-17 RX ADMIN — Medication 10 ML: at 07:26

## 2019-10-17 RX ADMIN — HYDROMORPHONE HYDROCHLORIDE 1 MG: 1 INJECTION, SOLUTION INTRAMUSCULAR; INTRAVENOUS; SUBCUTANEOUS at 12:06

## 2019-10-17 RX ADMIN — FAMOTIDINE 20 MG: 10 INJECTION INTRAVENOUS at 09:21

## 2019-10-17 RX ADMIN — AMLODIPINE BESYLATE 10 MG: 10 TABLET ORAL at 09:22

## 2019-10-17 RX ADMIN — Medication 10 ML: at 00:43

## 2019-10-17 RX ADMIN — Medication 10 ML: at 13:33

## 2019-10-17 RX ADMIN — OXYCODONE HYDROCHLORIDE AND ACETAMINOPHEN 2 TABLET: 5; 325 TABLET ORAL at 00:57

## 2019-10-17 RX ADMIN — BUDESONIDE 500 MCG: 0.5 INHALANT RESPIRATORY (INHALATION) at 07:40

## 2019-10-17 RX ADMIN — HYDROMORPHONE HYDROCHLORIDE 1 MG: 1 INJECTION, SOLUTION INTRAMUSCULAR; INTRAVENOUS; SUBCUTANEOUS at 09:17

## 2019-10-17 RX ADMIN — BISACODYL 5 MG: 5 TABLET, COATED ORAL at 09:21

## 2019-10-17 RX ADMIN — METOPROLOL TARTRATE 12.5 MG: 25 TABLET ORAL at 09:22

## 2019-10-17 RX ADMIN — HYDROMORPHONE HYDROCHLORIDE 1 MG: 1 INJECTION, SOLUTION INTRAMUSCULAR; INTRAVENOUS; SUBCUTANEOUS at 05:35

## 2019-10-17 NOTE — DISCHARGE INSTRUCTIONS
Abdominal Hernia Repair: What to Expect at Home  Your Recovery  After surgery to repair your hernia, you are likely to have pain for a few days. You may also feel like you have the flu, and you may have a low fever and feel tired and nauseated. This is common. You should feel better after a few days and will probably feel much better in 7 days. For several weeks you may feel twinges or pulling in the hernia repair when you move. You may have some bruising around the area of your hernia repair. This is normal.  This care sheet gives you a general idea about how long it will take for you to recover. But each person recovers at a different pace. Follow the steps below to get better as quickly as possible. How can you care for yourself at home? Activity    · Rest when you feel tired. Getting enough sleep will help you recover.     · Try to walk each day. Start by walking a little more than you did the day before. Bit by bit, increase the amount you walk. Walking boosts blood flow and helps prevent pneumonia and constipation.     · If your doctor gives you an abdominal binder to wear, use it as directed. This is an elastic bandage that wraps around your belly and upper hips. It helps support your belly muscles after surgery.     · Avoid strenuous activities, such as biking, jogging, weight lifting, or aerobic exercise, until your doctor says it is okay.     · Avoid lifting anything that would make you strain. This may include heavy grocery bags and milk containers, a heavy briefcase or backpack, cat litter or dog food bags, a vacuum , or a child.     · Ask your doctor when you can drive again.     · Most people are able to return to work within 1 to 2 weeks after surgery. But if your job requires that you to do heavy lifting or strenuous activity, you may need to take 4 to 6 weeks off from work.     · You may shower 24 to 48 hours after surgery, if your doctor okays it. Pat the cut (incision) dry.  Do not take a bath for the first 2 weeks, or until your doctor tells you it is okay.     · Ask your doctor when it is okay for you to have sex. Diet    · You can eat your normal diet. If your stomach is upset, try bland, low-fat foods like plain rice, broiled chicken, toast, and yogurt.     · Drink plenty of fluids (unless your doctor tells you not to).     · You may notice that your bowel movements are not regular right after your surgery. This is common. Avoid constipation and straining with bowel movements. You may want to take a fiber supplement every day. If you have not had a bowel movement after a couple of days, ask your doctor about taking a mild laxative. Medicines    · Your doctor will tell you if and when you can restart your medicines. He or she will also give you instructions about taking any new medicines.     · If you take blood thinners, such as warfarin (Coumadin), clopidogrel (Plavix), or aspirin, be sure to talk to your doctor. He or she will tell you if and when to start taking those medicines again. Make sure that you understand exactly what your doctor wants you to do.     · Be safe with medicines. Take pain medicines exactly as directed. ? If the doctor gave you a prescription medicine for pain, take it as prescribed. ? If you are not taking a prescription pain medicine, ask your doctor if you can take an over-the-counter medicine.     · If your doctor prescribed antibiotics, take them as directed. Do not stop taking them just because you feel better. You need to take the full course of antibiotics.     · If you think your pain medicine is making you sick to your stomach:  ? Take your medicine after meals (unless your doctor has told you not to). ? Ask your doctor for a different pain medicine. Incision care    · If you have strips of tape on the cut (incision) the doctor made, leave the tape on for a week or until it falls off. Or follow your doctor's instructions for removing the tape.   · If you have staples closing the cut, you will need to visit your doctor in 1 to 2 weeks to have them removed.     · Wash the area daily with warm, soapy water, and pat it dry. Don't use hydrogen peroxide or alcohol, which can slow healing. You may cover the area with a gauze bandage if it weeps or rubs against clothing. Change the bandage every day. Other instructions    · Hold a pillow over your incision when you cough or take deep breaths. This will support your belly and decrease your pain.     · Do breathing exercises at home as instructed by your doctor. This will help prevent pneumonia.     · If you had laparoscopic surgery, you may also have pain in your left shoulder. The pain usually lasts about a day or two. Follow-up care is a key part of your treatment and safety. Be sure to make and go to all appointments, and call your doctor if you are having problems. It's also a good idea to know your test results and keep a list of the medicines you take. When should you call for help? Call 911 anytime you think you may need emergency care. For example, call if:    · You passed out (lost consciousness).     · You are short of breath.    Call your doctor now or seek immediate medical care if:    · You are sick to your stomach and cannot drink fluids.     · You have signs of a blood clot in your leg (called a deep vein thrombosis), such as:  ? Pain in your calf, back of the knee, thigh, or groin. ? Redness and swelling in your leg or groin.     · You have signs of infection, such as:  ? Increased pain, swelling, warmth, or redness. ? Red streaks leading from the incision. ? Pus draining from the incision. ?  A fever.     · You cannot pass stools or gas.     · You have pain that does not get better after you take pain medicine.     · You have loose stitches, or your incision comes open.     · Bright red blood has soaked through the bandage over your incision.    Watch closely for changes in your health, and be sure to contact your doctor if you have any problems. Where can you learn more? Go to http://magda-kadie.info/. Enter B577 in the search box to learn more about \"Abdominal Hernia Repair: What to Expect at Home. \"  Current as of: November 7, 2018  Content Version: 12.2  © 1717-4664 [employer]. Care instructions adapted under license by [employer] (which disclaims liability or warranty for this information). If you have questions about a medical condition or this instruction, always ask your healthcare professional. [address] any warranty or liability for your use of this information. Acute Pain After Surgery: Care Instructions  Your Care Instructions    It's common to have some pain after surgery. Pain doesn't mean that something is wrong or that the surgery didn't go well. But when the pain is severe, it's important to work with your doctor to manage it. It's also important to be aware of a few facts about pain and pain medicine. · You are the only person who knows what your pain feels like. So be sure to tell your doctor when you are in pain or when the pain changes. Then he or she will know how to adjust your medicines. · Pain is often easier to control right after it starts. So it may be better to take regular doses of pain medicine and not wait until the pain gets bad. · Medicine can help control pain. But this doesn't mean you'll have no pain. Medicine works to keep the pain at a level you can live with. With time, you will feel better. Follow-up care is a key part of your treatment and safety. Be sure to make and go to all appointments, and call your doctor if you are having problems. It's also a good idea to know your test results and keep a list of the medicines you take. How can you care for yourself at home? · Be safe with medicines. Read and follow all instructions on the label.   ? If the doctor gave you a prescription medicine for pain, take it as prescribed. ? If you are not taking a prescription pain medicine, ask your doctor if you can take an over-the-counter medicine. · If you take an over-the-counter pain medicine, such as acetaminophen (Tylenol), ibuprofen (Advil, Motrin), or naproxen (Aleve), read and follow all instructions on the label. · Do not take two or more pain medicines at the same time unless the doctor told you to. · Do not drink alcohol while you are taking pain medicines. · Try to walk each day if your doctor recommends it. Start by walking a little more than you did the day before. Bit by bit, increase the amount you walk. Walking increases blood flow. It also helps prevent pneumonia and constipation. · To prevent constipation from opioid pain medicines:  ? Talk to your doctor about a laxative. ? Include fruits, vegetables, beans, and whole grains in your diet each day. These foods are high in fiber. ? Drink plenty of fluids, enough so that your urine is light yellow or clear like water. Drink water, fruit juice, or other drinks that do not contain caffeine or alcohol. If you have kidney, heart, or liver disease and have to limit fluids, talk with your doctor before you increase the amount of fluids you drink. ? Take a fiber supplement, such as Citrucel or Metamucil, every day if needed. Read and follow all instructions on the label. If you take pain medicine for more than a few days, talk to your doctor before you take fiber. When should you call for help? Call your doctor now or seek immediate medical care if:    · Your pain gets worse.     · Your pain is not controlled by medicine.    Watch closely for changes in your health, and be sure to contact your doctor if you have any problems. Where can you learn more? Go to http://magda-kadie.info/. Enter [phone] [phone] in the search box to learn more about \"Acute Pain After Surgery: Care Instructions. \"  Current as of: June 2019  Content Version: 12.2  © 8805-8750 [employer]. Care instructions adapted under license by [employer] (which disclaims liability or warranty for this information). If you have questions about a medical condition or this instruction, always ask your healthcare professional. [address] any warranty or liability for your use of this information. Patient Education   Patient armband removed and shreddedMyChart Activation    Thank you for requesting access to [employer]. Please follow the instructions below to securely access and download your online medical record. [employer] allows you to send messages to your doctor, view your test results, renew your prescriptions, schedule appointments, and more. How Do I Sign Up? 1. In your internet browser, go to [email or web address]  2. Click on the First Time User? Click Here link in the Sign In box. You will be redirect to the New Member Sign Up page. 3. Enter your [employer] Access Code exactly as it appears below. You will not need to use this code after youve completed the sign-up process. If you do not sign up before the expiration date, you must request a new code. [employer] Access Code: [email or web address]  Expires: 2019 10:58 PM (This is the date your [employer] access code will )    4. Enter the last four digits of your Social Security Number (xxxx) and Date of Birth (mm/dd/yyyy) as indicated and click Submit. You will be taken to the next sign-up page. 5. Create a [employer] ID. This will be your [employer] login ID and cannot be changed, so think of one that is secure and easy to remember. 6. Create a [employer] password. You can change your password at any time. 7. Enter your Password Reset Question and Answer. This can be used at a later time if you forget your password. 8. Enter your e-mail address. You will receive e-mail notification when new information is available in [address] 9. Click Sign Up. You can now view and download portions of your medical record. 10. Click the Download Summary menu link to download a portable copy of your medical information. Additional Information    If you have questions, please visit the Frequently Asked Questions section of the [employer] website at [email or web address] Remember, [facility] Inchart is NOT to be used for urgent needs. For medical emergencies, dial 911. DISCHARGE SUMMARY from Nurse    PATIENT INSTRUCTIONS:    After general anesthesia or intravenous sedation, for 24 hours or while taking prescription Narcotics:  · Limit your activities  · Do not drive and operate hazardous machinery  · Do not make important personal or business decisions  · Do  not drink alcoholic beverages  · If you have not urinated within 8 hours after discharge, please contact your surgeon on call. Report the following to your surgeon:  · Excessive pain, swelling, redness or odor of or around the surgical area  · Temperature over 100.5  · Nausea and vomiting lasting longer than 4 hours or if unable to take medications  · Any signs of decreased circulation or nerve impairment to extremity: change in color, persistent  numbness, tingling, coldness or increase pain  · Any questions    What to do at Home:  Recommended activity: Activity as tolerated and no driving for today, Ambulate in house and Bedrest,     If you experience any of the following symptoms chest pain, shortness, nausea and vomiting dizziness of breath, please follow up with emergency room. *  Please give a list of your current medications to your Primary Care Provider. *  Please update this list whenever your medications are discontinued, doses are      changed, or new medications (including over-the-counter products) are added. *  Please carry medication information at all times in case of emergency situations.     These are general instructions for a healthy lifestyle:    No smoking/ No tobacco products/ Avoid exposure to second hand smoke  Surgeon General's Warning:  Quitting smoking now greatly reduces serious risk to your health. Obesity, smoking, and sedentary lifestyle greatly increases your risk for illness    A healthy diet, regular physical exercise & weight monitoring are important for maintaining a healthy lifestyle    You may be retaining fluid if you have a history of heart failure or if you experience any of the following symptoms:  Weight gain of 3 pounds or more overnight or 5 pounds in a week, increased swelling in our hands or feet or shortness of breath while lying flat in bed. Please call your doctor as soon as you notice any of these symptoms; do not wait until your next office visit. The discharge information has been reviewed with the patient. The patient verbalized understanding. Discharge medications reviewed with the patient and appropriate educational materials and side effects teaching were provided. ___________________________________________________________________________________________________________________________________     Abdominal Hernia Repair: What to Expect at Home  Your Recovery  After surgery to repair your hernia, you are likely to have pain for a few days. You may also feel like you have the flu, and you may have a low fever and feel tired and nauseated. This is common. You should feel better after a few days and will probably feel much better in 7 days. For several weeks you may feel twinges or pulling in the hernia repair when you move. You may have some bruising around the area of your hernia repair. This is normal.  This care sheet gives you a general idea about how long it will take for you to recover. But each person recovers at a different pace. Follow the steps below to get better as quickly as possible. How can you care for yourself at home? Activity    · Rest when you feel tired.  Getting enough sleep will help you recover.     · Try to walk each day. Start by walking a little more than you did the day before. Bit by bit, increase the amount you walk. Walking boosts blood flow and helps prevent pneumonia and constipation.     · If your doctor gives you an abdominal binder to wear, use it as directed. This is an elastic bandage that wraps around your belly and upper hips. It helps support your belly muscles after surgery.     · Avoid strenuous activities, such as biking, jogging, weight lifting, or aerobic exercise, until your doctor says it is okay.     · Avoid lifting anything that would make you strain. This may include heavy grocery bags and milk containers, a heavy briefcase or backpack, cat litter or dog food bags, a vacuum , or a child.     · Ask your doctor when you can drive again.     · Most people are able to return to work within 1 to 2 weeks after surgery. But if your job requires that you to do heavy lifting or strenuous activity, you may need to take 4 to 6 weeks off from work.     · You may shower 24 to 48 hours after surgery, if your doctor okays it. Pat the cut (incision) dry. Do not take a bath for the first 2 weeks, or until your doctor tells you it is okay.     · Ask your doctor when it is okay for you to have sex. Diet    · You can eat your normal diet. If your stomach is upset, try bland, low-fat foods like plain rice, broiled chicken, toast, and yogurt.     · Drink plenty of fluids (unless your doctor tells you not to).     · You may notice that your bowel movements are not regular right after your surgery. This is common. Avoid constipation and straining with bowel movements. You may want to take a fiber supplement every day. If you have not had a bowel movement after a couple of days, ask your doctor about taking a mild laxative. Medicines    · Your doctor will tell you if and when you can restart your medicines.  He or she will also give you instructions about taking any new medicines.     · If you take blood thinners, such as warfarin (Coumadin), clopidogrel (Plavix), or aspirin, be sure to talk to your doctor. He or she will tell you if and when to start taking those medicines again. Make sure that you understand exactly what your doctor wants you to do.     · Be safe with medicines. Take pain medicines exactly as directed. ? If the doctor gave you a prescription medicine for pain, take it as prescribed. ? If you are not taking a prescription pain medicine, ask your doctor if you can take an over-the-counter medicine.     · If your doctor prescribed antibiotics, take them as directed. Do not stop taking them just because you feel better. You need to take the full course of antibiotics.     · If you think your pain medicine is making you sick to your stomach:  ? Take your medicine after meals (unless your doctor has told you not to). ? Ask your doctor for a different pain medicine. Incision care    · If you have strips of tape on the cut (incision) the doctor made, leave the tape on for a week or until it falls off. Or follow your doctor's instructions for removing the tape.     · If you have staples closing the cut, you will need to visit your doctor in 1 to 2 weeks to have them removed.     · Wash the area daily with warm, soapy water, and pat it dry. Don't use hydrogen peroxide or alcohol, which can slow healing. You may cover the area with a gauze bandage if it weeps or rubs against clothing. Change the bandage every day. Other instructions    · Hold a pillow over your incision when you cough or take deep breaths. This will support your belly and decrease your pain.     · Do breathing exercises at home as instructed by your doctor. This will help prevent pneumonia.     · If you had laparoscopic surgery, you may also have pain in your left shoulder. The pain usually lasts about a day or two. Follow-up care is a key part of your treatment and safety.  Be sure to make and go to all appointments, and call your doctor if you are having problems. It's also a good idea to know your test results and keep a list of the medicines you take. When should you call for help? Call 911 anytime you think you may need emergency care. For example, call if:    · You passed out (lost consciousness).     · You are short of breath.    Call your doctor now or seek immediate medical care if:    · You are sick to your stomach and cannot drink fluids.     · You have signs of a blood clot in your leg (called a deep vein thrombosis), such as:  ? Pain in your calf, back of the knee, thigh, or groin. ? Redness and swelling in your leg or groin.     · You have signs of infection, such as:  ? Increased pain, swelling, warmth, or redness. ? Red streaks leading from the incision. ? Pus draining from the incision. ? A fever.     · You cannot pass stools or gas.     · You have pain that does not get better after you take pain medicine.     · You have loose stitches, or your incision comes open.     · Bright red blood has soaked through the bandage over your incision.    Watch closely for changes in your health, and be sure to contact your doctor if you have any problems. Where can you learn more? Go to http://magda-kadie.info/. Enter B577 in the search box to learn more about \"Abdominal Hernia Repair: What to Expect at Home. \"  Current as of: November 7, 2018  Content Version: 12.2  © 3528-8878 [employer]. Care instructions adapted under license by [employer] (which disclaims liability or warranty for this information). If you have questions about a medical condition or this instruction, always ask your healthcare professional. [place] [address] any warranty or liability for your use of this information. Patient Education        Learning About ACE Inhibitors  Introduction    ACE (angiotensin-converting enzyme) inhibitors stop the release of an enzyme.  This enzyme makes your blood vessels smaller. Without it, your blood vessels relax and get bigger. This lowers your blood pressure. These medicines also increase how much water and salt go into your urine. This also lowers blood pressure. You may take this kind of medicine if you have high blood pressure. Or you may take it if you have heart problems, kidney problems, or diabetes. If you have coronary artery disease, this medicine can help prevent heart attacks and strokes. Examples  · Benazepril (Lotensin)  · Lisinopril (Prinivil, Zestril)  · Ramipril (Altace)  This is not a complete list.  Possible side effects  Side effects may include:  · A cough. · Low blood pressure. This can make you feel dizzy or weak. · Too much potassium in your body. · Swelling. This may be a sign of an allergic reaction. You may have other side effects or reactions not listed here. Check the information that comes with your medicine. What to know about taking this medicine  · ACE inhibitors can cause a dry cough. Talk to your doctor if you have a dry cough. You may need a different medicine. · These medicines can cause an allergic reaction. This can cause a little swelling. Or it can cause red bumps on your skin that hurt. In rare cases, the swelling may make it hard for you to breathe. · Do not take this medicine if you are pregnant. And don't take it if you plan to become pregnant. · Take your medicines exactly as prescribed. Call your doctor if you think you are having a problem with your medicine. · Check with your doctor or pharmacist before you use any other medicines. This includes over-the-counter medicines. Make sure your doctor knows all of the medicines, vitamins, herbal products, and supplements you take. Taking some medicines together can cause problems. · You may need regular blood tests. Where can you learn more? Go to http://magda-kadie.info/.   Enter P050 in the search box to learn more about \"Learning About ACE Inhibitors. \"  Current as of: April 9, 2019  Content Version: 12.2  © 2438-1025 [employer]. Care instructions adapted under license by [employer] (which disclaims liability or warranty for this information). If you have questions about a medical condition or this instruction, always ask your healthcare professional. [address] any warranty or liability for your use of this information. Patient Education        Learning About ARBs  Introduction    ARBs (angiotensin II receptor blockers) block a hormone that makes blood vessels narrow. As a result, the blood vessels relax and widen. This lowers blood pressure. ARBs also put more water and salt into the urine. This also lowers blood pressure. ARBs can treat:  · High blood pressure. · Coronary artery disease. · Heart failure. They also may be used to help your kidneys when you have diabetes. Examples  ARBs include:  · Candesartan (Atacand). · Irbesartan (Avapro). · Losartan (Cozaar). This is not a complete list of all ARBs. Possible side effects  Side effects may include:  · Low blood pressure. You may feel dizzy and weak. · Diarrhea. · High potassium levels. You may have other side effects or reactions not listed here. Check the information that comes with your medicine. What to know about taking this medicine  · ARBs may be used if you had a cough when you tried to take an ACE inhibitor. ARBs are less likely to cause a cough. · You may need regular blood tests. · Take your medicines exactly as prescribed. Call your doctor if you think you are having a problem with your medicine. · Tell your doctor or pharmacist all the medicines you take. This includes over-the-counter medicines, vitamins, herbal products, and supplements. Taking some medicines together can cause problems. · You should not take ARBs if you are pregnant or planning to become pregnant. Where can you learn more?   Go to http://magda-kadie.info/. Enter K212 in the search box to learn more about \"Learning About ARBs. \"  Current as of: April 9, 2019  Content Version: 12.2  © 9581-6158 [employer]. Care instructions adapted under license by [employer] (which disclaims liability or warranty for this information). If you have questions about a medical condition or this instruction, always ask your healthcare professional. [facility] any warranty or liability for your use of this information. Patient Education        Chronic Obstructive Pulmonary Disease (COPD): Care Instructions  Your Care Instructions    Chronic obstructive pulmonary disease (COPD) is a general term for a group of lung diseases, including emphysema and chronic bronchitis. People with COPD have decreased airflow in and out of the lungs, which makes it hard to breathe. The airways also can get clogged with thick mucus. Cigarette smoking is a major cause of COPD. Although there is no cure for COPD, you can slow its progress. Following your treatment plan and taking care of yourself can help you feel better and live longer. Follow-up care is a key part of your treatment and safety. Be sure to make and go to all appointments, and call your doctor if you are having problems. It's also a good idea to know your test results and keep a list of the medicines you take. How can you care for yourself at home?   Staying healthy    · Do not smoke. This is the most important step you can take to prevent more damage to your lungs. If you need help quitting, talk to your doctor about stop-smoking programs and medicines. These can increase your chances of quitting for good.     · Avoid colds and flu. Get a pneumococcal vaccine shot. If you have had one before, ask your doctor whether you need a second dose. Get the flu vaccine every fall.  If you must be around people with colds or the flu, wash your hands often.     · Avoid secondhand smoke, air pollution, and high altitudes. Also avoid cold, dry air and hot, humid air. Stay at home with your windows closed when air pollution is bad.    Medicines and oxygen therapy    · Take your medicines exactly as prescribed. Call your doctor if you think you are having a problem with your medicine.     · You may be taking medicines such as:  ? Bronchodilators. These help open your airways and make breathing easier. Bronchodilators are either short-acting (work for 6 to 9 hours) or long-acting (work for 24 hours). You inhale most bronchodilators, so they start to act quickly. Always carry your quick-relief inhaler with you in case you need it while you are away from home. ? Corticosteroids (prednisone, budesonide). These reduce airway inflammation. They come in pill or inhaled form. You must take these medicines every day for them to work well.     · A spacer may help you get more inhaled medicine to your lungs. Ask your doctor or pharmacist if a spacer is right for you. If it is, ask how to use it properly.     · Do not take any vitamins, over-the-counter medicine, or herbal products without talking to your doctor first.     · If your doctor prescribed antibiotics, take them as directed. Do not stop taking them just because you feel better. You need to take the full course of antibiotics.     · Oxygen therapy boosts the amount of oxygen in your blood and helps you breathe easier. Use the flow rate your doctor has recommended, and do not change it without talking to your doctor first.   Activity    · Get regular exercise. Walking is an easy way to get exercise. Start out slowly, and walk a little more each day.     · Pay attention to your breathing.  You are exercising too hard if you cannot talk while you are exercising.     · Take short rest breaks when doing household chores and other activities.     · Learn breathing methods--such as breathing through pursed lips--to help you become less short of breath.     · If your doctor has not set you up with a pulmonary rehabilitation program, talk to him or her about whether rehab is right for you. Rehab includes exercise programs, education about your disease and how to manage it, help with diet and other changes, and emotional support. Diet    · Eat regular, healthy meals. Use bronchodilators about 1 hour before you eat to make it easier to eat. Eat several small meals instead of three large ones. Drink beverages at the end of the meal. Avoid foods that are hard to chew.     · Eat foods that contain protein so that you do not lose muscle mass.     · Talk with your doctor if you gain too much weight or if you lose weight without trying.    Mental health    · Talk to your family, friends, or a therapist about your feelings. It is normal to feel frightened, angry, hopeless, helpless, and even guilty. Talking openly about bad feelings can help you cope. If these feelings last, talk to your doctor. When should you call for help? Call 911 anytime you think you may need emergency care. For example, call if:    · You have severe trouble breathing.    Call your doctor now or seek immediate medical care if:    · You have new or worse trouble breathing.     · You cough up blood.     · You have a fever.    Watch closely for changes in your health, and be sure to contact your doctor if:    · You cough more deeply or more often, especially if you notice more mucus or a change in the color of your mucus.     · You have new or worse swelling in your legs or belly.     · You are not getting better as expected. Where can you learn more? Go to http://magda-kadie.info/. Lillian Bailey in the search box to learn more about \"Chronic Obstructive Pulmonary Disease (COPD): Care Instructions. \"  Current as of: June 9, 2019  Content Version: 12.2  © 3669-8628 [employer].  Care instructions adapted under license by [facility] (which disclaims liability or warranty for this information). If you have questions about a medical condition or this instruction, always ask your healthcare professional. [place] [address] any warranty or liability for your use of this information. Patient Education        Sleep Apnea: Care Instructions  Your Care Instructions    Sleep apnea means that you frequently stop breathing for 10 seconds or longer during sleep. It can be mild to severe, based on the number of times an hour that you stop breathing or have slowed breathing. Blocked or narrowed airways in your nose, mouth, or throat can cause sleep apnea. Your airway can become blocked when your throat muscles and tongue relax during sleep. You can treat sleep apnea at home by making lifestyle changes. You also can use a CPAP breathing machine that keeps tissues in the throat from blocking your airway. Or your doctor may suggest that you use a breathing device while you sleep. It helps keep your airway open. This could be a device that you put in your mouth. In some cases, surgery may be needed to remove enlarged tissues in the throat. Follow-up care is a key part of your treatment and safety. Be sure to make and go to all appointments, and call your doctor if you are having problems. It's also a good idea to know your test results and keep a list of the medicines you take. How can you care for yourself at home? · Lose weight, if needed. It may reduce the number of times you stop breathing or have slowed breathing. · Sleep on your side. It may stop mild apnea. If you tend to roll onto your back, sew a pocket in the back of your pajama top. Put a tennis ball into the pocket, and stitch the pocket shut. This will help keep you from sleeping on your back. · Avoid alcohol and medicines such as sleeping pills and sedatives before bed. · Do not smoke. Smoking can make sleep apnea worse.  If you need help quitting, talk to your doctor about stop-smoking programs and medicines. These can increase your chances of quitting for good. · Prop up the head of your bed 4 to 6 inches by putting bricks under the legs of the bed. · Treat breathing problems, such as a stuffy nose, caused by a cold or allergies. · Try a continuous positive airway pressure (CPAP) breathing machine if your doctor recommends it. The machine keeps your airway open when you sleep. · If CPAP does not work for you, ask your doctor if you can try other breathing machines. A bilevel positive airway pressure machine uses one type of air pressure for breathing in and another type for breathing out. Another device raises or lowers air pressure as needed while you breathe. · Talk to your doctor if:  ? Your nose feels dry or bleeds when you use one of these machines. You may need to increase moisture in the air. A humidifier may help. ? Your nose is runny or stuffy from using a breathing machine. Decongestants or a corticosteroid nasal spray may help. ? You are sleepy during the day and it gets in the way of the normal things you do. Do not drive when you are drowsy. When should you call for help? Watch closely for changes in your health, and be sure to contact your doctor if:    · You still have sleep apnea even though you have made lifestyle changes.     · You are thinking of trying a device such as CPAP.     · You are having problems using a CPAP or similar machine. Where can you learn more? Go to http://magda-kadie.info/. Enter T208 in the search box to learn more about \"Sleep Apnea: Care Instructions. \"  Current as of: June 9, 2019  Content Version: 12.2  © 7402-4592 [employer]. Care instructions adapted under license by [employer] (which disclaims liability or warranty for this information).  If you have questions about a medical condition or this instruction, always ask your healthcare professional. [employer] disclaims any warranty or liability for your use of this information.

## 2019-10-17 NOTE — WOUND CARE
Physical Exam  
Room 463: Had message pt wanted to speak to me. Arrived to pt's room this am & she stated I put her wound vac on yesterday without her consent, which is not true. Yesterday pt was sleepy, male visitor in room was asleep, pt assisted with wound vac placement, assisted to bedside commode afterwards. I apologized to pt, but I stated I did speak with her about vac placement procedure yesterday & she assisted pulling her gown up for placement.   
Marlyn MCNEILLN, RN, [facility], [address]

## 2019-10-17 NOTE — DISCHARGE SUMMARY
[facility]  Shelly Tran MD, [facility]  General Surgery  Discharge Summary     Patient ID:  Jose Gordon  [id number]  24 y.o.  1965    Admit Date: 10/13/2019    Discharge Date: 10/17/2019    Admission Diagnoses: Incarcerated incisional hernia [K43.0]    Discharge Diagnoses:    Problem List as of 10/17/2019 Date Reviewed: 9/28/2019          Codes Class Noted - Resolved    * (Principal) Incarcerated incisional hernia ICD-10-CM: K43.0  ICD-9-CM: 552.21  10/13/2019 - Present        COPD (chronic obstructive pulmonary disease) ([facility] ICD-10-CM: J44.9  ICD-9-CM: 496  9/28/2019 - Present        Hypertensive urgency ICD-10-CM: I16.0  ICD-9-CM: 401.9  9/28/2019 - Present        Tobacco use ICD-10-CM: Z72.0  ICD-9-CM: 305.1  9/28/2019 - Present        Acute bronchitis with chronic obstructive pulmonary disease (COPD) ([facility] ICD-10-CM: J44.0, J20.9  ICD-9-CM: 491.22  3/9/2019 - Present        T wave inversion in EKG ICD-10-CM: R94.31  ICD-9-CM: 794.31  3/9/2019 - Present        Hypert[facility] heart failure ([facility] ICD-10-CM: I11.0  ICD-9-CM: 402.91, 428.9  12/19/2017 - Present        Sleep apnea ICD-10-CM: G47.30  ICD-9-CM: 780.57  12/19/2017 - Present        COPD (chronic obstructive pulmonary disease) with chronic bronchitis ([facility] ICD-10-CM: J44.9  ICD-9-CM: 491.20  12/19/2017 - Present        Acute asthma exacerbation ICD-10-CM: J45.901  ICD-9-CM: 493.92  4/21/2017 - Present        Essential hypertension ICD-10-CM: I10  ICD-9-CM: 401.9  3/10/2017 - Present        Morbid obesity due to excess calories ([facility] ICD-10-CM: E66.01  ICD-9-CM: 278.01  3/10/2017 - Present        COPD with acute exacerbation ([facility] ICD-10-CM: J44.1  ICD-9-CM: 491.21  3/2/2017 - Present        Acute respiratory failure with hypercapnia (HCC) ICD-10-CM: J96.02  ICD-9-CM: 518.81  2/26/2017 - Present        Asthma exacerbation ICD-10-CM: J45.901  ICD-9-CM: 493.92  1/26/2017 - Present        Respiratory failure ([facility] ICD-10-CM: J96.90  ICD-9-CM: 518.81  1/11/2017 - Present        Acute encephalopathy ICD-10-CM: G93.40  ICD-9-CM: 348.30  1/11/2017 - Present        Acute exacerbation of chronic obstructive pulmonary disease (COPD) ([facility] ICD-10-CM: J44.1  ICD-9-CM: 491.21  8/29/2016 - Present        Acute on chronic respiratory failure with hypoxemia [facility]) ICD-10-CM: J96.21  ICD-9-CM: 518.84  3/12/2016 - Present        Asthma ICD-10-CM: J45.909  ICD-9-CM: 493.90  3/5/2016 - Present        Abnormal CT of the chest ICD-10-CM: R93.89  ICD-9-CM: 793.2  11/20/2015 - Present        Asthma exacerbation attacks ICD-10-CM: J45.901  ICD-9-CM: 493.92  10/19/2015 - Present        Status asthmaticus with COPD (chronic obstructive pulmonary disease) ([facility] ICD-10-CM: J44.9, J45.902  ICD-9-CM: 493.21  10/19/2015 - Present        RESOLVED: CAP (community acquired pneumonia) ICD-10-CM: J18.9  ICD-9-CM: 486  9/28/2019 - 10/13/2019        RESOLVED: Chest pain on breathing ICD-10-CM: R07.1  ICD-9-CM: 786.52  3/10/2017 - 10/13/2019        RESOLVED: COPD with exacerbation ([facility] ICD-10-CM: J44.1  ICD-9-CM: 491.21  7/11/2016 - 9/28/2019        RESOLVED: COPD exacerbation ([facility] ICD-10-CM: J44.1  ICD-9-CM: 491.21  3/12/2016 - 9/28/2019        RESOLVED: Shortness of breath ICD-10-CM: R06.02  ICD-9-CM: 786.05  3/5/2016 - 9/28/2019        RESOLVED: COPD exacerbation ([facility] ICD-10-CM: J44.1  ICD-9-CM: 491.21  2/2/2016 - 2/8/2016        RESOLVED: COPD with exacerbation ([facility] ICD-10-CM: J44.1  ICD-9-CM: 491.21  2/2/2016 - 2/8/2016        RESOLVED: COPD with acute exacerbation ([facility] ICD-10-CM: J44.1  ICD-9-CM: 491.21  11/17/2015 - 11/20/2015        RESOLVED: Asthma exacerbation ICD-10-CM: J45.901  ICD-9-CM: 493.92  11/17/2015 - 11/20/2015               Admission Condition: Poor    Discharge Condition: Fair    Last Procedure: Procedure(s):  LAPAROTOMY EXPLORATORY/LYSIS OF ADHESIONS/PRIMARY REPAIR INCARCERATED HERNIA  HERNIA INGUINAL REPAIR    Hospital Course:   Normal hospital course for this procedure. Consults: Hospitalist    Significant Diagnostic Studies: None    Disposition: Home with home health    Patient Instructions:   Current Discharge Medication List      START taking these medications    Details   oxyCODONE-acetaminophen (PERCOCET) 5-325 mg per tablet Take 1 Tab by mouth every six (6) hours as needed for Pain for up to 7 days. Max Daily Amount: 4 Tabs. Indications: pain  Qty: 25 Tab, Refills: 0    Associated Diagnoses: Postoperative pain      ibuprofen (MOTRIN) 800 mg tablet Take 1 Tab by mouth three (3) times daily as needed for Pain. Qty: 40 Tab, Refills: 0      ondansetron (ZOFRAN ODT) 4 mg disintegrating tablet Take 1 Tab by mouth every eight (8) hours as needed for Nausea. Indications: prevent nausea and vomiting after surgery  Qty: 20 Tab, Refills: 0         CONTINUE these medications which have CHANGED    Details   docusate sodium (COLACE) 100 mg capsule Take 1 Cap by mouth two (2) times a day. Qty: 120 Cap, Refills: 0         CONTINUE these medications which have NOT CHANGED    Details   !! albuterol sulfate 90 mcg/actuation aepb Take 2 Puffs by inhalation every four (4) hours as needed (shortness of breath, wheezing, cough). Qty: 1 Inhaler, Refills: 0      hydroCHLOROthiazide (HYDRODIURIL) 50 mg tablet Take 25 mg by mouth daily. QUEtiapine (SEROQUEL) 300 mg tablet Take 300 mg by mouth nightly. Indications: States she lost her daughter      !! albuterol sulfate 90 mcg/actuation aepb Take 2 Puffs by inhalation every four (4) hours as needed for Cough or Other (Wheezing). Qty: 1 Inhaler, Refills: 0      fluticasone furoate-vilanterol (BREO ELLIPTA) 200-25 mcg/dose inhaler Take 1 Puff by inhalation daily. lisinopril (PRINIVIL, ZESTRIL) 20 mg tablet Take 20 mg by mouth daily. amLODIPine (NORVASC) 10 mg tablet Take 1 Tab by mouth daily.   Qty: 30 Tab, Refills: 0      butalbital-acetaminophen-caff (FIORICET) -40 mg per capsule Take 1 Cap by mouth every four (4) hours as needed for Pain. Qty: 10 Cap, Refills: 0      arformoterol (BROVANA) 15 mcg/2 mL nebu neb solution 2 mL by Nebulization route two (2) times a day. Qty: 60 Vial, Refills: 0      budesonide (PULMICORT) 0.5 mg/2 mL nbsp 2 mL by Nebulization route two (2) times a day. Qty: 60 Each, Refills: 0      tiotropium (SPIRIVA) 18 mcg inhalation capsule Take 1 Cap by inhalation daily. Qty: 30 Cap, Refills: 0      atorvastatin (LIPITOR) 20 mg tablet Take 1 Tab by mouth nightly. Qty: 30 Tab, Refills: 0      cloNIDine HCl (CATAPRES) 0.2 mg tablet Take 1 Tab by mouth two (2) times a day. Qty: 60 Tab, Refills: 0      escitalopram oxalate (LEXAPRO) 10 mg tablet Take 1 Tab by mouth every evening. Qty: 30 Tab, Refills: 0      metoprolol tartrate (LOPRESSOR) 25 mg tablet Take 0.5 Tabs by mouth every twelve (12) hours. Qty: 30 Tab, Refills: 0      roflumilast (DALIRESP) 500 mcg tab tablet Take 1 Tab by mouth daily. Qty: 30 Tab, Refills: 0      OTHER Incentive spirometry- use as directed  Qty: 1 Each, Refills: 0      naloxone (NARCAN) 4 mg/actuation nasal spray Use 1 spray intranasally, then discard. Repeat with new spray every 2 min as needed for opioid overdose symptoms, alternating nostrils. Qty: 1 Each, Refills: 0      budesonide-formoterol (SYMBICORT) 160-4.5 mcg/actuation HFAA Take 2 Puffs by inhalation two (2) times a day. Qty: 1 Inhaler, Refills: 1      ALPRAZolam (XANAX) 1 mg tablet Take 1 Tab by mouth two (2) times a day. Max Daily Amount: 2 mg. Qty: 20 Tab, Refills: 0      famotidine (PEPCID) 20 mg tablet Take 1 Tab by mouth daily. Qty: 30 Tab, Refills: 1      montelukast (SINGULAIR) 10 mg tablet Take 1 Tab by mouth nightly. Qty: 30 Tab, Refills: 1       !! - Potential duplicate medications found. Please discuss with provider.       STOP taking these medications       doxycycline (VIBRA-TABS) 100 mg tablet Comments:   Reason for Stopping:         aspirin 81 mg chewable tablet Comments: Reason for Stopping:             Activity: See surgical instructions  Diet: Low fat, Low cholesterol  Wound Care: As directed    Follow-up with Dr. Livia Huddleston in 2 weeks.   Follow-up tests/labs None    Signed:  Israel Teran MD  10/17/2019  12:50 PM

## 2019-10-17 NOTE — PROGRESS NOTES
Problem: Mobility Impaired (Adult and Pediatric)  Goal: *Acute Goals and Plan of Care (Insert Text)  Description  Physical Therapy Goals  Initiated 10/17/2019 and to be accomplished within 7 day(s)  1. Patient will move from supine to sit and sit to supine , scoot up and down and roll side to side in bed with modified independence. 2.  Patient will transfer from bed to chair and chair to bed with supervision/set-up using the least restrictive device. 3.  Patient will perform sit to stand with modified independence. 4.  Patient will ambulate with supervision/set-up for >50 feet with the least restrictive device. 5.  Patient will ascend/descend 3 stairs with 1 handrail(s) with supervision/set-up. PLOF: Patient lives with her boyfriend and her daugher is local. Patient has PCA care 7 days for a total of 30hrs. Patient has RW that she ambulates with and has no other AD/DME. Patient has several steps to enter and lives in 1 story home. Outcome: Progressing Towards Goal   PHYSICAL THERAPY EVALUATION    Patient: Josi Rinaldi (34 y.o. female)  Date: 10/17/2019  Primary Diagnosis: Incarcerated incisional hernia [K43.0]  Procedure(s) (LRB):  LAPAROTOMY EXPLORATORY/LYSIS OF ADHESIONS/PRIMARY REPAIR INCARCERATED HERNIA (N/A)  HERNIA INGUINAL REPAIR (N/A) 4 Days Post-Op   Precautions: Fall, abdominal binder    ASSESSMENT :  Patient is 48yo F admitted to hospital for hernia and underwent repair. Patient presents today alert and agreeable to therapy and was semi-reclined in bed upon arrival with abdominal binder donned. Patient transferred to sitting for objective assessment and then stood to ambulate with RW aprpx 40ft with Antonino for safety with walker negotiation. Patient demos CoG anterior to Gaby as she demos anterior trunk lean and demos fair carryover of VC's to bring walker closer to Gaby. Patient may be attempting to mitigate abdominal pain with forward flexion.  Patient transferred to supine in bed at conclusion of session with additional time to recover with breathing. Patient limited by decreased endurance and mobility and would benefit from SNF to return to pre-morbid functional status. Patient will benefit from skilled intervention to address the above impairments. Patient's rehabilitation potential is considered to be Good  Factors which may influence rehabilitation potential include:   ? None noted  ? Mental ability/status  ? Medical condition  ? Home/family situation and support systems  ? Safety awareness  ? Pain tolerance/management  ? Other:      PLAN :  Recommendations and Planned Interventions:   ?           Bed Mobility Training             ? Neuromuscular Re-Education  ? Transfer Training                   ? Orthotic/Prosthetic Training  ? Gait Training                          ? Modalities  ? Therapeutic Exercises           ? Edema Management/Control  ? Therapeutic Activities            ? Family Training/Education  ? Patient Education  ? Other (comment):    Frequency/Duration: Patient will be followed by physical therapy 1-2 times per day/4-7 days per week to address goals. Discharge Recommendations: [facility]  Further Equipment Recommendations for Discharge: bedside commode, transfer bench, rolling walker and transport WC for outside/longer distances      SUBJECTIVE:   Patient stated Petar Esquivel had a WC but somebody stole it.     OBJECTIVE DATA SUMMARY:     Past Medical History:   Diagnosis Date    Asthma     CHF (congestive heart failure) ([facility] [address]     Chronic obstructive pulmonary disease (HCC)     Endocrine disease     thyroid issues    Gastrointestinal disorder     \"blockage in my stomach\"    Hypertension    History reviewed. No pertinent surgical history.   Barriers to Learning/Limitations: None  Compensate with: N/A  Home Situation:  [address] [facility] Environment: Private residence  One/Two Story Residence: One story  Living Alone: Yes  Support Systems: Family member(s)  Patient Expects to be Discharged to[de-identified] Private residence  Current DME Used/Available at Home: None  Critical Behavior:    A&Ox4  Strength:    Strength: Generally decreased, functional(BLE)   Tone & Sensation:   Tone: Normal(BLE)   Sensation: Intact(BLE)   Range Of Motion:  AROM: Within functional limits(BLE)   Functional Mobility:  Bed Mobility:   Supine to Sit: Supervision   Scooting: Supervision  Transfers:  Sit to Stand: Contact guard assistance  Stand to Sit: Contact guard assistance    Balance:   Sitting: Intact  Standing: Impaired; With support  Standing - Static: Fair  Standing - Dynamic : Fair  Ambulation/Gait Training:  Distance (ft): 40 Feet (ft)  Assistive Device: Walker, rolling  Ambulation - Level of Assistance: Minimal assistance   Gait Abnormalities: Decreased step clearance   Speed/Daphne: Slow  Step Length: Left shortened;Right shortened   Interventions: Safety awareness training; Tactile cues; Verbal cues; Visual/Demos  Pain:  Pain level pre-treatment: 3/10   Pain level post-treatment: 3/10   Pain Intervention(s) : Medication (see MAR); Rest, Repositioning  Response to intervention: Nurse notified, See doc flow    Activity Tolerance:   Patient tolerated activity fair; demos fatigue, SOB, and states these are greatest limitations in walking. Please refer to the flowsheet for vital signs taken during this treatment. After treatment:   ?         Patient left in no apparent distress sitting up in chair  ? Patient left in no apparent distress in bed  ? Call bell left within reach  ? Nursing notified  ? Caregiver present  ? Bed alarm activated  ? SCDs applied    COMMUNICATION/EDUCATION:   ?         Role of Physical Therapy in the acute care setting.   ?         Fall prevention education was provided and the patient/caregiver indicated understanding. ? Patient/family have participated as able in goal setting and plan of care. ?         Patient/family agree to work toward stated goals and plan of care. ?         Patient understands intent and goals of therapy, but is neutral about his/her participation. ? Patient is unable to participate in goal setting/plan of care: ongoing with therapy staff. ?         Other:     Thank you for this referral.  Renetta Cosme, PT   Time Calculation: 23 mins      Eval Complexity: History: MEDIUM  Complexity : 1-2 comorbidities / personal factors will impact the outcome/ POC Exam:LOW Complexity : 1-2 Standardized tests and measures addressing body structure, function, activity limitation and / or participation in recreation  Presentation: LOW Complexity : Stable, uncomplicated  Clinical Decision Making:Low Complexity    Overall Complexity:LOW

## 2019-10-17 NOTE — PROGRESS NOTES
Discharge planning    Discharge order noted for today. Freedom of choice obtained & signed for [facility] and referral.  Met with patient and agreeable to the transition plan today. Transport has been arranged with a friend. Patient's discharge summary and home health  orders have been forwarded to  [facility] home health  agency via [employer]. Updated bedside RN, Eve Patel, to the transition plan. Discharge information has been documented on the AVS.  Per Luisana Dejesus with [id number], patient refused HH. She was cursing at her. Luisana Dejesus stated \" I will call Dr. Juancarlos Cadet. \"    CHARITO ZamudioN, RN  Pager # [phone]  Care Manager

## 2019-10-17 NOTE — PROGRESS NOTES
Pt is well known to me from previous Admissions - she is on chronic narcotics - I would advise Surg to change her meds to PO & decrease to a minimum if possible

## 2019-10-17 NOTE — PROGRESS NOTES
Problem: Surgical Pathway Day of Surgery  Goal: Off Pathway (Use only if patient is Off Pathway)  Outcome: Progressing Towards Goal  Goal: Activity/Safety  Outcome: Progressing Towards Goal  Goal: Consults, if ordered  Outcome: Progressing Towards Goal  Goal: Nutrition/Diet  Outcome: Progressing Towards Goal  Goal: Medications  Outcome: Progressing Towards Goal  Goal: Respiratory  Outcome: Progressing Towards Goal  Goal: Treatments/Interventions/Procedures  Outcome: Progressing Towards Goal  Goal: Psychosocial  Outcome: Progressing Towards Goal  Goal: *No signs and symptoms of infection or wound complications  Outcome: Progressing Towards Goal  Goal: *Optimal pain control at patient's stated goal  Outcome: Progressing Towards Goal  Goal: *Adequate urinary output (equal to or greater than 30 milliliters/hour)  Description  Ambulatory Surgery patients voiding without difficulty.   Outcome: Progressing Towards Goal  Goal: *Hemodynamically stable  Outcome: Progressing Towards Goal  Goal: *Tolerating diet  Outcome: Progressing Towards Goal  Goal: *Demonstrates progressive activity  Outcome: Progressing Towards Goal     Problem: Surgical Pathway Post-Op Day 1  Goal: Off Pathway (Use only if patient is Off Pathway)  Outcome: Progressing Towards Goal  Goal: Activity/Safety  Outcome: Progressing Towards Goal  Goal: Diagnostic Test/Procedures  Outcome: Progressing Towards Goal  Goal: Nutrition/Diet  Outcome: Progressing Towards Goal  Goal: Discharge Planning  Outcome: Progressing Towards Goal  Goal: Medications  Outcome: Progressing Towards Goal  Goal: Respiratory  Outcome: Progressing Towards Goal  Goal: Treatments/Interventions/Procedures  Outcome: Progressing Towards Goal  Goal: Psychosocial  Outcome: Progressing Towards Goal  Goal: *No signs and symptoms of infection or wound complications  Outcome: Progressing Towards Goal  Goal: *Optimal pain control at patient's stated goal  Outcome: Progressing Towards Goal  Goal: *Adequate urinary output (equal to or greater than 30 milliliters/hour)  Outcome: Progressing Towards Goal  Goal: *Hemodynamically stable  Outcome: Progressing Towards Goal  Goal: *Tolerating diet  Outcome: Progressing Towards Goal  Goal: *Demonstrates progressive activity  Outcome: Progressing Towards Goal  Goal: *Lungs clear or at baseline  Outcome: Progressing Towards Goal     Problem: Surgical Pathway Post-Op Day 2 through Discharge  Goal: Off Pathway (Use only if patient is Off Pathway)  Outcome: Progressing Towards Goal  Goal: Activity/Safety  Outcome: Progressing Towards Goal  Goal: Nutrition/Diet  Outcome: Progressing Towards Goal  Goal: Discharge Planning  Outcome: Progressing Towards Goal  Goal: Medications  Outcome: Progressing Towards Goal  Goal: Respiratory  Outcome: Progressing Towards Goal  Goal: Treatments/Interventions/Procedures  Outcome: Progressing Towards Goal  Goal: Psychosocial  Outcome: Progressing Towards Goal  Goal: *No signs and symptoms of infection or wound complications  Outcome: Progressing Towards Goal  Goal: *Optimal pain control at patient's stated goal  Outcome: Progressing Towards Goal  Goal: *Adequate urinary output (equal to or greater than 30 milliliters/hour)  Outcome: Progressing Towards Goal  Goal: *Hemodynamically stable  Outcome: Progressing Towards Goal  Goal: *Tolerating diet  Outcome: Progressing Towards Goal  Goal: *Demonstrates progressive activity  Outcome: Progressing Towards Goal  Goal: *Lungs clear or at baseline  Outcome: Progressing Towards Goal     Problem: Surgical Pathway: Discharge Outcomes  Goal: *Hemodynamically stable  Outcome: Progressing Towards Goal  Goal: *Lungs clear or at baseline  Outcome: Progressing Towards Goal  Goal: *Demonstrates independent activity or return to baseline  Outcome: Progressing Towards Goal  Goal: *Optimal pain control at patient's stated goal  Outcome: Progressing Towards Goal  Goal: *Verbalizes understanding and describes prescribed diet  Outcome: Progressing Towards Goal  Goal: *Tolerating diet  Outcome: Progressing Towards Goal  Goal: *Verbalizes name, dosage, time, side effects, and number of days to continue medications  Outcome: Progressing Towards Goal  Goal: *No signs and symptoms of infection or wound complications  Outcome: Progressing Towards Goal  Goal: *Anxiety reduced or absent  Outcome: Progressing Towards Goal  Goal: *Understands and describes signs and symptoms to report to providers(Stroke Metric)  Outcome: Progressing Towards Goal  Goal: *Describes follow-up/return visits to physicians  Outcome: Progressing Towards Goal  Goal: *Describes available resources and support systems  Outcome: Progressing Towards Goal     Problem: Pain  Goal: *Control of Pain  Outcome: Progressing Towards Goal  Goal: *PALLIATIVE CARE:  Alleviation of Pain  Outcome: Progressing Towards Goal     Problem: Falls - Risk of  Goal: *Absence of Falls  Description  Document [place] Fall Risk and appropriate interventions in the flowsheet. Outcome: Progressing Towards Goal  Note:   Fall Risk Interventions:  Mobility Interventions: Assess mobility with egress test, Bed/chair exit alarm, Communicate number of staff needed for ambulation/transfer, OT consult for ADLs, Patient to call before getting OOB, Strengthening exercises (ROM-active/passive)         Medication Interventions: Assess postural VS orthostatic hypotension, Bed/chair exit alarm, Evaluate medications/consider consulting pharmacy, Teach patient to arise slowly    Elimination Interventions: Call light in reach, Elevated toilet seat, Patient to call for help with toileting needs, Toilet paper/wipes in reach, Toileting schedule/hourly rounds              Problem: Pressure Injury - Risk of  Goal: *Prevention of pressure injury  Description  Document Darrell Scale and appropriate interventions in the flowsheet.   Outcome: Progressing Towards Goal  Note:   Pressure Injury Interventions:  Sensory Interventions: Assess changes in LOC, Keep linens dry and wrinkle-free, Maintain/enhance activity level, Minimize linen layers, Monitor skin under medical devices, Pad between skin to skin, Pressure redistribution bed/mattress (bed type), Sit a 90-degree angle/use footstool if needed    Moisture Interventions: Contain wound drainage, Maintain skin hydration (lotion/cream), Minimize layers    Activity Interventions: Chair cushion, Pressure redistribution bed/mattress(bed type)    Mobility Interventions: Assess need for specialty bed, HOB 30 degrees or less, Pressure redistribution bed/mattress (bed type)    Nutrition Interventions: Document food/fluid/supplement intake                     Problem: Patient Education: Go to Patient Education Activity  Goal: Patient/Family Education  Outcome: Progressing Towards Goal     Problem: Chronic Obstructive Pulmonary Disease (COPD)  Goal: *Oxygen saturation during activity within specified parameters  Outcome: Progressing Towards Goal  Goal: *Able to remain out of bed as prescribed  Outcome: Progressing Towards Goal  Goal: *Absence of hypoxia  Outcome: Progressing Towards Goal  Goal: *Optimize nutritional status  Outcome: Progressing Towards Goal     Problem: Patient Education: Go to Patient Education Activity  Goal: Patient/Family Education  Outcome: Progressing Towards Goal

## 2019-10-17 NOTE — HOME CARE
Received  referral , Discharge order noted for today;  upon setting up and explaining  [facility] care to patient, pt states that she is not going to be homebound ,states\"I'm not having anyone tell me about being homebound,she states \"Do not be alternating my life\" ,pt became very angry and cursed/used profanity  at this liaison ,pt questioned why this liaison needed her address and telephone # , pt explained that her address and phone number  had to be verified in order to know where to come see her for [facility] services, pt states \"no one is going to tell me what I can and can not do\" ,pt also states she does not want to list anyone as her caregiver , pt still continues to curse at this liaison despite trying to set pt up [facility] wound care;  notified Dr Magnolia Aschoff about situation,about pt's behavior ,about pt not being homebound, no caregiver to teach wound care too and  inapropriate speech to this liaison, therefore this [facility] referral for [facility] cancelled, Dr Magnolia Aschoff states pt can F/U at his office in 7 days ( states next Tuesday) , 4N  arranged for office visit for patient next Tuesday and pt informed of this information. Also notified  April Rivas) of this situation and pt's behavior. Mimi Webb.

## 2019-10-20 ENCOUNTER — HOSPITAL ENCOUNTER (EMERGENCY)
Age: 54
Discharge: HOME | End: 2019-10-20
Attending: EMERGENCY MEDICINE
Payer: MEDICAID

## 2019-10-20 ENCOUNTER — APPOINTMENT (OUTPATIENT)
Dept: GENERAL RADIOLOGY | Age: 54
End: 2019-10-20
Attending: NURSE PRACTITIONER
Payer: MEDICAID

## 2019-10-20 VITALS
TEMPERATURE: 98.1 F | WEIGHT: 192 LBS | SYSTOLIC BLOOD PRESSURE: 195 MMHG | OXYGEN SATURATION: 99 % | DIASTOLIC BLOOD PRESSURE: 97 MMHG | RESPIRATION RATE: 18 BRPM | BODY MASS INDEX: 38.71 KG/M2 | HEIGHT: 59 IN | HEART RATE: 75 BPM

## 2019-10-20 DIAGNOSIS — G89.18 POST-OPERATIVE PAIN: Primary | ICD-10-CM

## 2019-10-20 LAB
ALBUMIN SERPL-MCNC: 3.7 G/DL (ref 3.4–5)
ALBUMIN/GLOB SERPL: 0.9 {RATIO} (ref 0.8–1.7)
ALP SERPL-CCNC: 115 U/L (ref 45–117)
ALT SERPL-CCNC: 11 U/L (ref 13–56)
ANION GAP SERPL CALC-SCNC: 3 MMOL/L (ref 3–18)
AST SERPL-CCNC: <3 U/L (ref 10–38)
BASOPHILS # BLD: 0 K/UL (ref 0–0.1)
BASOPHILS NFR BLD: 0 % (ref 0–2)
BILIRUB SERPL-MCNC: 0.6 MG/DL (ref 0.2–1)
BUN SERPL-MCNC: 20 MG/DL (ref 7–18)
BUN/CREAT SERPL: 16 (ref 12–20)
CALCIUM SERPL-MCNC: 8.9 MG/DL (ref 8.5–10.1)
CHLORIDE SERPL-SCNC: 108 MMOL/L (ref 100–111)
CO2 SERPL-SCNC: 30 MMOL/L (ref 21–32)
CREAT SERPL-MCNC: 1.26 MG/DL (ref 0.6–1.3)
DIFFERENTIAL METHOD BLD: ABNORMAL
EOSINOPHIL # BLD: 0.3 K/UL (ref 0–0.4)
EOSINOPHIL NFR BLD: 4 % (ref 0–5)
ERYTHROCYTE [DISTWIDTH] IN BLOOD BY AUTOMATED COUNT: 13 % (ref 11.6–14.5)
GLOBULIN SER CALC-MCNC: 4 G/DL (ref 2–4)
GLUCOSE SERPL-MCNC: 88 MG/DL (ref 74–99)
HCT VFR BLD AUTO: 35.7 % (ref 35–45)
HGB BLD-MCNC: 11.5 G/DL (ref 12–16)
LACTATE BLD-SCNC: 0.51 MMOL/L (ref 0.4–2)
LYMPHOCYTES # BLD: 1.8 K/UL (ref 0.9–3.6)
LYMPHOCYTES NFR BLD: 24 % (ref 21–52)
MAGNESIUM SERPL-MCNC: 2.2 MG/DL (ref 1.6–2.6)
MCH RBC QN AUTO: 29.5 PG (ref 24–34)
MCHC RBC AUTO-ENTMCNC: 32.2 G/DL (ref 31–37)
MCV RBC AUTO: 91.5 FL (ref 74–97)
MONOCYTES # BLD: 0.6 K/UL (ref 0.05–1.2)
MONOCYTES NFR BLD: 7 % (ref 3–10)
NEUTS SEG # BLD: 5.1 K/UL (ref 1.8–8)
NEUTS SEG NFR BLD: 65 % (ref 40–73)
PLATELET # BLD AUTO: 234 K/UL (ref 135–420)
PMV BLD AUTO: 11.2 FL (ref 9.2–11.8)
POTASSIUM SERPL-SCNC: 4.2 MMOL/L (ref 3.5–5.5)
PROT SERPL-MCNC: 7.7 G/DL (ref 6.4–8.2)
RBC # BLD AUTO: 3.9 M/UL (ref 4.2–5.3)
SODIUM SERPL-SCNC: 141 MMOL/L (ref 136–145)
WBC # BLD AUTO: 7.7 K/UL (ref 4.6–13.2)

## 2019-10-20 PROCEDURE — 99285 EMERGENCY DEPT VISIT HI MDM: CPT

## 2019-10-20 PROCEDURE — 74011250636 HC RX REV CODE- 250/636: Performed by: NURSE PRACTITIONER

## 2019-10-20 PROCEDURE — 83735 ASSAY OF MAGNESIUM: CPT

## 2019-10-20 PROCEDURE — 96376 TX/PRO/DX INJ SAME DRUG ADON: CPT

## 2019-10-20 PROCEDURE — 96375 TX/PRO/DX INJ NEW DRUG ADDON: CPT

## 2019-10-20 PROCEDURE — 96374 THER/PROPH/DIAG INJ IV PUSH: CPT

## 2019-10-20 PROCEDURE — 83605 ASSAY OF LACTIC ACID: CPT

## 2019-10-20 PROCEDURE — 80053 COMPREHEN METABOLIC PANEL: CPT

## 2019-10-20 PROCEDURE — 94762 N-INVAS EAR/PLS OXIMTRY CONT: CPT

## 2019-10-20 PROCEDURE — 85025 COMPLETE CBC W/AUTO DIFF WBC: CPT

## 2019-10-20 PROCEDURE — 74022 RADEX COMPL AQT ABD SERIES: CPT

## 2019-10-20 RX ORDER — ONDANSETRON 2 MG/ML
8 INJECTION INTRAMUSCULAR; INTRAVENOUS
Status: COMPLETED | OUTPATIENT
Start: 2019-10-20 | End: 2019-10-20

## 2019-10-20 RX ORDER — KETOROLAC TROMETHAMINE 15 MG/ML
15 INJECTION, SOLUTION INTRAMUSCULAR; INTRAVENOUS
Status: COMPLETED | OUTPATIENT
Start: 2019-10-20 | End: 2019-10-20

## 2019-10-20 RX ORDER — MORPHINE SULFATE 4 MG/ML
4 INJECTION, SOLUTION INTRAMUSCULAR; INTRAVENOUS
Status: COMPLETED | OUTPATIENT
Start: 2019-10-20 | End: 2019-10-20

## 2019-10-20 RX ADMIN — MORPHINE SULFATE 4 MG: 4 INJECTION, SOLUTION INTRAMUSCULAR; INTRAVENOUS at 20:17

## 2019-10-20 RX ADMIN — KETOROLAC TROMETHAMINE 15 MG: 15 INJECTION, SOLUTION INTRAMUSCULAR; INTRAVENOUS at 20:25

## 2019-10-20 RX ADMIN — ONDANSETRON 8 MG: 2 INJECTION INTRAMUSCULAR; INTRAVENOUS at 17:30

## 2019-10-20 RX ADMIN — SODIUM CHLORIDE 1000 ML: 900 INJECTION, SOLUTION INTRAVENOUS at 17:37

## 2019-10-20 RX ADMIN — MORPHINE SULFATE 4 MG: 4 INJECTION, SOLUTION INTRAMUSCULAR; INTRAVENOUS at 17:30

## 2019-10-20 NOTE — ED PROVIDER NOTES
[facility]  Emergency Department Treatment Report        5:39 PM Serafin Epps is a 53 y.o. female with a history of incarcerated hernia repair on 1013 and was discharged on 1017 to follow-up in 6 days for wound check. Patient has a wound VAC and states increased pain and the pain medicine is not working. Patient denies any fever chills or drainage from site no redness around the site patient states she has called her surgeon and talk to a nurse and they told her to come to the emergency room. No other complaints, associated symptoms or modifying factors at this time. PCP: Asael Syed DO  Surgeon Dr. Kendrick Marie      The history is provided by the patient. No  was used. Post OP Complication   This is a new problem. The current episode started 12 to 24 hours ago. The problem occurs constantly. The problem has not changed since onset. Associated symptoms include abdominal pain. Pertinent negatives include no chest pain, no headaches and no shortness of breath. Nothing aggravates the symptoms. Nothing relieves the symptoms. She has tried nothing for the symptoms. Past Medical History:   Diagnosis Date    Asthma     CHF (congestive heart failure) (HCC)     Chronic obstructive pulmonary disease (HCC)     Endocrine disease     thyroid issues    Gastrointestinal disorder     \"blockage in my stomach\"    Hypertension        No past surgical history on file. No family history on file.     Social History     Socioeconomic History    Marital status: SINGLE     Spouse name: Not on file    Number of children: Not on file    Years of education: Not on file    Highest education level: Not on file   Occupational History    Not on file   Social Needs    Financial resource strain: Not on file    Food insecurity:     Worry: Not on file     Inability: Not on file    Transportation needs:     Medical: Not on file     Non-medical: Not on file   Tobacco Use  Smoking status: Current Every Day Smoker     Packs/day: 0.50    Smokeless tobacco: Former User   Substance and Sexual Activity    Alcohol use: No     Comment: socially    Drug use: Not Currently     Types: Heroin    Sexual activity: Never     Comment: last used 9 years ago   Lifestyle    Physical activity:     Days per week: Not on file     Minutes per session: Not on file    Stress: Not on file   Relationships    Social connections:     Talks on phone: Not on file     Gets together: Not on file     Attends Mu-ism service: Not on file     Active member of club or organization: Not on file     Attends meetings of clubs or organizations: Not on file     Relationship status: Not on file    Intimate partner violence:     Fear of current or ex partner: Not on file     Emotionally abused: Not on file     Physically abused: Not on file     Forced sexual activity: Not on file   Other Topics Concern    Not on file   Social History Narrative    Not on file         ALLERGIES: Patient has no known allergies. Review of Systems   Constitutional: Negative for fever. Respiratory: Negative for shortness of breath. Cardiovascular: Negative for chest pain. Gastrointestinal: Positive for abdominal pain. Skin: Positive for wound. Negative for color change. Neurological: Negative for dizziness and headaches. All other systems reviewed and are negative. Vitals:    10/20/19 1529 10/20/19 1611 10/20/19 1700   BP: (!) 195/97     Pulse: 75     Resp: 18     Temp: 98.1 °F (36.7 °C)     SpO2: 98% 98% 99%   Weight: 87.1 kg (192 lb)     Height: 4' 11\" (1.499 m)              Physical Exam   Constitutional: She is oriented to person, place, and time. She appears well-developed and well-nourished. HENT:   Head: Normocephalic and atraumatic. Eyes: Pupils are equal, round, and reactive to light. Conjunctivae and EOM are normal.   Neck: Normal range of motion. Neck supple.    Cardiovascular: Normal rate and regular rhythm. Pulmonary/Chest: Effort normal and breath sounds normal.   Abdominal: Soft. Bowel sounds are normal. There is tenderness. Wound VAC in place working appropriately no sign of cellulitis around + tenderness around site. Musculoskeletal: Normal range of motion. Neurological: She is alert and oriented to person, place, and time. She has normal reflexes. Skin: Skin is warm and dry. Psychiatric: She has a normal mood and affect. Her behavior is normal. Judgment and thought content normal.   Nursing note and vitals reviewed. MDM  Number of Diagnoses or Management Options  Post-operative pain:   Diagnosis management comments: Patient with a history of wound VAC and surgery on 13 October she was discharged on the 17th and was to follow-up with the surgeon in 6 days there is a 23 October. Patient had stated that wound VAC was supposed to be removed 6 days ago and unsure of where that information came from. At this time wound VAC is working appropriately no signs of cellulitis around site she is tender around the site. I will get labs medicate for pain with probable consultation with surgeon Dr. Andrade Monday. I do not suspect sepsis at this time no tachycardia no fever no sign of cellulitis around site. Wound nurse checked the wound no sign of infection patient informed on wearing abdominal binder at all times. Patient was also advised to use an incentive spirometer one was provided teaching was provided. She is to use the incentive spirometer for 5 minutes every hour while awake. I do not suspect infection there is no drainage from the wound no intra-abdominal issues are suspected. Patient had minor atelectasis to the base of the lungs on x-ray and spirometry was then taught and provided. Patient is to follow-up with Dr. Andrade Monday with surgery as scheduled on Wednesday and to return to the emergency department if worse for any fever or worsening pain.        Amount and/or Complexity of Data Reviewed  Clinical lab tests: ordered and reviewed  Review and summarize past medical records: yes  Independent visualization of images, tracings, or specimens: yes    Risk of Complications, Morbidity, and/or Mortality  Presenting problems: moderate  Diagnostic procedures: moderate  Management options: moderate    Patient Progress  Patient progress: stable         Procedures          Vitals:  Patient Vitals for the past 12 hrs:   Temp Pulse Resp BP SpO2   10/20/19 1700     99 %   10/20/19 1611     98 %   10/20/19 1529 98.1 °F (36.7 °C) 75 18 (!) 195/97 98 %       Medications ordered:   Medications   morphine injection 4 mg (4 mg IntraVENous Given 10/20/19 1730)   ondansetron (ZOFRAN) injection 8 mg (8 mg IntraVENous Given 10/20/19 1730)   sodium chloride 0.9 % bolus infusion 1,000 mL (0 mL IntraVENous IV Completed 10/20/19 2027)   morphine injection 4 mg (4 mg IntraVENous Given 10/20/19 2017)   ketorolac (TORADOL) injection 15 mg (15 mg IntraVENous Given 10/20/19 2025)         Lab findings:  Recent Results (from the past 12 hour(s))   CBC WITH AUTOMATED DIFF    Collection Time: 10/20/19  3:52 PM   Result Value Ref Range    WBC 7.7 4.6 - 13.2 K/uL    RBC 3.90 (L) 4.20 - 5.30 M/uL    HGB 11.5 (L) 12.0 - 16.0 g/dL    HCT 35.7 35.0 - 45.0 %    MCV 91.5 74.0 - 97.0 FL    MCH 29.5 24.0 - 34.0 PG    MCHC 32.2 31.0 - 37.0 g/dL    RDW 13.0 11.6 - 14.5 %    PLATELET 254 470 - 574 K/uL    MPV 11.2 9.2 - 11.8 FL    NEUTROPHILS 65 40 - 73 %    LYMPHOCYTES 24 21 - 52 %    MONOCYTES 7 3 - 10 %    EOSINOPHILS 4 0 - 5 %    BASOPHILS 0 0 - 2 %    ABS. NEUTROPHILS 5.1 1.8 - 8.0 K/UL    ABS. LYMPHOCYTES 1.8 0.9 - 3.6 K/UL    ABS. MONOCYTES 0.6 0.05 - 1.2 K/UL    ABS. EOSINOPHILS 0.3 0.0 - 0.4 K/UL    ABS.  BASOPHILS 0.0 0.0 - 0.1 K/UL    DF AUTOMATED     METABOLIC PANEL, COMPREHENSIVE    Collection Time: 10/20/19  3:52 PM   Result Value Ref Range    Sodium 141 136 - 145 mmol/L    Potassium 4.2 3.5 - 5.5 mmol/L    Chloride 108 100 - 111 mmol/L    CO2 30 21 - 32 mmol/L    Anion gap 3 3.0 - 18 mmol/L    Glucose 88 74 - 99 mg/dL    BUN 20 (H) 7.0 - 18 MG/DL    Creatinine 1.26 0.6 - 1.3 MG/DL    BUN/Creatinine ratio 16 12 - 20      GFR est AA 53 (L) >60 ml/min/1.73m2    GFR est non-AA 44 (L) >60 ml/min/1.73m2    Calcium 8.9 8.5 - 10.1 MG/DL    Bilirubin, total 0.6 0.2 - 1.0 MG/DL    ALT (SGPT) 11 (L) 13 - 56 U/L    AST (SGOT) <3 (L) 10 - 38 U/L    Alk. phosphatase 115 45 - 117 U/L    Protein, total 7.7 6.4 - 8.2 g/dL    Albumin 3.7 3.4 - 5.0 g/dL    Globulin 4.0 2.0 - 4.0 g/dL    A-G Ratio 0.9 0.8 - 1.7     MAGNESIUM    Collection Time: 10/20/19  3:52 PM   Result Value Ref Range    Magnesium 2.2 1.6 - 2.6 mg/dL   POC LACTIC ACID    Collection Time: 10/20/19  3:54 PM   Result Value Ref Range    Lactic Acid (POC) 0.51 0.40 - 2.00 mmol/L        X-Ray, CT or other radiology findings or impressions:  XR ABD ACUTE W 1 V CHEST   Final Result   IMPRESSION:      Equivocal mild perihilar pulmonary interstitial infiltrate/edema. No acute abdominal findings. See details above. Progress notes, Consult notes or additional Procedure notes:       Wound nurse was consulted and came into the ED to see the patient. She examined the wound she was aware of the patient and her wound VAC. Wound VAC was placed due to a few staples being gone at the first wound check and it would help to apply more pressure and keep the wound closed and approximated doing wound healing. She has an appointment on Wednesday to have the staples removed. The wound VAC will then be removed at that time. The wound nurse examined the wound said there was no the wound VAC and all was stable. Disposition:    Diagnosis:   1.  Post-operative pain        Disposition: to Home      Follow-up Information     Follow up With Specialties Details Why Contact Info    Spencer Estrella DO  In 2 days  [address]  [phone] Patient's Medications   Start Taking    No medications on file   Continue Taking    ALBUTEROL SULFATE 90 MCG/ACTUATION AEPB    Take 2 Puffs by inhalation every four (4) hours as needed for Cough or Other (Wheezing). ALBUTEROL SULFATE 90 MCG/ACTUATION AEPB    Take 2 Puffs by inhalation every four (4) hours as needed (shortness of breath, wheezing, cough). ALPRAZOLAM (XANAX) 1 MG TABLET    Take 1 Tab by mouth two (2) times a day. Max Daily Amount: 2 mg. AMLODIPINE (NORVASC) 10 MG TABLET    Take 1 Tab by mouth daily. ARFORMOTEROL (BROVANA) 15 MCG/2 ML NEBU NEB SOLUTION    2 mL by Nebulization route two (2) times a day. ATORVASTATIN (LIPITOR) 20 MG TABLET    Take 1 Tab by mouth nightly. BUDESONIDE (PULMICORT) 0.5 MG/2 ML NBSP    2 mL by Nebulization route two (2) times a day. BUDESONIDE-FORMOTEROL (SYMBICORT) 160-4.5 MCG/ACTUATION HFAA    Take 2 Puffs by inhalation two (2) times a day. BUTALBITAL-ACETAMINOPHEN-CAFF (FIORICET) -40 MG PER CAPSULE    Take 1 Cap by mouth every four (4) hours as needed for Pain. CLONIDINE HCL (CATAPRES) 0.2 MG TABLET    Take 1 Tab by mouth two (2) times a day. DOCUSATE SODIUM (COLACE) 100 MG CAPSULE    Take 1 Cap by mouth two (2) times a day. ESCITALOPRAM OXALATE (LEXAPRO) 10 MG TABLET    Take 1 Tab by mouth every evening. FAMOTIDINE (PEPCID) 20 MG TABLET    Take 1 Tab by mouth daily. FLUTICASONE FUROATE-VILANTEROL (BREO ELLIPTA) 200-25 MCG/DOSE INHALER    Take 1 Puff by inhalation daily. HYDROCHLOROTHIAZIDE (HYDRODIURIL) 50 MG TABLET    Take 25 mg by mouth daily. IBUPROFEN (MOTRIN) 800 MG TABLET    Take 1 Tab by mouth three (3) times daily as needed for Pain. LISINOPRIL (PRINIVIL, ZESTRIL) 20 MG TABLET    Take 20 mg by mouth daily. METOPROLOL TARTRATE (LOPRESSOR) 25 MG TABLET    Take 0.5 Tabs by mouth every twelve (12) hours. MONTELUKAST (SINGULAIR) 10 MG TABLET    Take 1 Tab by mouth nightly.     NALOXONE (NARCAN) 4 MG/ACTUATION NASAL SPRAY    Use 1 spray intranasally, then discard. Repeat with new spray every 2 min as needed for opioid overdose symptoms, alternating nostrils. ONDANSETRON (ZOFRAN ODT) 4 MG DISINTEGRATING TABLET    Take 1 Tab by mouth every eight (8) hours as needed for Nausea. Indications: prevent nausea and vomiting after surgery    OTHER    Incentive spirometry- use as directed    OXYCODONE-ACETAMINOPHEN (PERCOCET) 5-325 MG PER TABLET    Take 1 Tab by mouth every six (6) hours as needed for Pain for up to 7 days. Max Daily Amount: 4 Tabs. Indications: pain    QUETIAPINE (SEROQUEL) 300 MG TABLET    Take 300 mg by mouth nightly. Indications: States she lost her daughter    ROFLUMILAST (DALIRESP) 500 MCG TAB TABLET    Take 1 Tab by mouth daily. TIOTROPIUM (SPIRIVA) 18 MCG INHALATION CAPSULE    Take 1 Cap by inhalation daily. These Medications have changed    No medications on file   Stop Taking    No medications on file       Return to the ER if you are unable to obtain referral as directed. Keegan Ny  results have been reviewed with her. She has been counseled regarding her diagnosis, treatment, and plan. She verbally conveys understanding and agreement of the signs, symptoms, diagnosis, treatment and prognosis and additionally agrees to follow up as discussed. She also agrees with the care-plan and conveys that all of her questions have been answered. I have also provided discharge instructions for her that include: educational information regarding their diagnosis and treatment, and list of reasons why they would want to return to the ED prior to their follow-up appointment, should her condition change. Real Deis ENP-C,FNP-C      Dragon voice recognition was used to generate this report, which may have resulted in some phonetic based errors in grammar and contents.  Even though attempts were made to correct all the mistakes, some may have been missed, and remained in the body of the document

## 2019-10-21 NOTE — DISCHARGE INSTRUCTIONS
Patient Education        Acute Pain After Surgery: Care Instructions  Your Care Instructions    It's common to have some pain after surgery. Pain doesn't mean that something is wrong or that the surgery didn't go well. But when the pain is severe, it's important to work with your doctor to manage it. It's also important to be aware of a few facts about pain and pain medicine. · You are the only person who knows what your pain feels like. So be sure to tell your doctor when you are in pain or when the pain changes. Then he or she will know how to adjust your medicines. · Pain is often easier to control right after it starts. So it may be better to take regular doses of pain medicine and not wait until the pain gets bad. · Medicine can help control pain. But this doesn't mean you'll have no pain. Medicine works to keep the pain at a level you can live with. With time, you will feel better. Follow-up care is a key part of your treatment and safety. Be sure to make and go to all appointments, and call your doctor if you are having problems. It's also a good idea to know your test results and keep a list of the medicines you take. How can you care for yourself at home? · Be safe with medicines. Read and follow all instructions on the label. ? If the doctor gave you a prescription medicine for pain, take it as prescribed. ? If you are not taking a prescription pain medicine, ask your doctor if you can take an over-the-counter medicine. · If you take an over-the-counter pain medicine, such as acetaminophen (Tylenol), ibuprofen (Advil, Motrin), or naproxen (Aleve), read and follow all instructions on the label. · Do not take two or more pain medicines at the same time unless the doctor told you to. · Do not drink alcohol while you are taking pain medicines. · Try to walk each day if your doctor recommends it. Start by walking a little more than you did the day before. Bit by bit, increase the amount you walk. Walking increases blood flow. It also helps prevent pneumonia and constipation. · To prevent constipation from opioid pain medicines:  ? Talk to your doctor about a laxative. ? Include fruits, vegetables, beans, and whole grains in your diet each day. These foods are high in fiber. ? Drink plenty of fluids, enough so that your urine is light yellow or clear like water. Drink water, fruit juice, or other drinks that do not contain caffeine or alcohol. If you have kidney, heart, or liver disease and have to limit fluids, talk with your doctor before you increase the amount of fluids you drink. ? Take a fiber supplement, such as Citrucel or Metamucil, every day if needed. Read and follow all instructions on the label. If you take pain medicine for more than a few days, talk to your doctor before you take fiber. When should you call for help? Call your doctor now or seek immediate medical care if:    · Your pain gets worse.     · Your pain is not controlled by medicine.    Watch closely for changes in your health, and be sure to contact your doctor if you have any problems. Where can you learn more? Go to http://magda-kadie.info/. Enter (36) 468-683 in the search box to learn more about \"Acute Pain After Surgery: Care Instructions. \"  Current as of: June 26, 2019  Content Version: 12.2  © 1734-1732 [email or web address]. Care instructions adapted under license by [employer] (which disclaims liability or warranty for this information). If you have questions about a medical condition or this instruction, always ask your healthcare professional. [place] [address] any warranty or liability for your use of this information.

## 2019-10-21 NOTE — ED NOTES
Patient was taught incentive spirometry. Patient correctly demonstrated the proper use 5 times. Patient was instructed to use the incentive spirometer every hour 5 times while she is awake. Patient agreed.

## 2019-10-21 NOTE — ED NOTES
Patient was taught how to properly get out of bed by rolling and not engaging her abdominal muscles. When I walked in the room patient was straining to get up how she normally has and was complaining of abdominal pain. I told the patient she needed to be rolling out of bed instead of engaging her abdominal muscles. Patient then told me to shut the fuck up.

## 2019-10-21 NOTE — ED NOTES
Patient requested to use a wheelchair to be transported to the waiting room where her brother was waiting to take her home.

## 2019-10-21 NOTE — WOUND CARE
Seen by wound care per request from ED, NPWT system: Prevena: assessment performed at this time. NPWT system functioning without issue, no s/s of infection, no induration or edema noted. ADB soft, tender to palpation s/p Hernia repair 10/17/2019. ABD binder positioned over NPWT system. Wound care education provided to pt at this time. Plan of care reviewed with nursing. Will turn over care to nursing staff at this time Angelena Schlatter, Wound Care Department

## 2019-10-23 ENCOUNTER — OFFICE VISIT (OUTPATIENT)
Dept: SURGERY | Age: 54
End: 2019-10-23

## 2019-10-23 VITALS
TEMPERATURE: 96.3 F | HEART RATE: 76 BPM | RESPIRATION RATE: 24 BRPM | OXYGEN SATURATION: 99 % | HEIGHT: 59 IN | DIASTOLIC BLOOD PRESSURE: 94 MMHG | SYSTOLIC BLOOD PRESSURE: 192 MMHG | BODY MASS INDEX: 37.5 KG/M2 | WEIGHT: 186 LBS

## 2019-10-23 DIAGNOSIS — S31.109A OPEN WOUND OF ABDOMINAL WALL, INITIAL ENCOUNTER: ICD-10-CM

## 2019-10-23 DIAGNOSIS — Z09 POSTOPERATIVE EXAMINATION: Primary | ICD-10-CM

## 2019-10-23 DIAGNOSIS — G89.18 POSTOPERATIVE PAIN: ICD-10-CM

## 2019-10-23 RX ORDER — OXYCODONE AND ACETAMINOPHEN 5; 325 MG/1; MG/1
1 TABLET ORAL
Qty: 25 TAB | Refills: 0 | Status: SHIPPED | OUTPATIENT
Start: 2019-10-23 | End: 2019-10-30

## 2019-10-23 NOTE — PROGRESS NOTES
Chief Complaint   Patient presents with    Post OP Follow Up       Small bowel distal jejunum in hernia sac i   1. Have you been to the ER, urgent care clinic since your last visit? Hospitalized since your last visit? yes    2. Have you seen or consulted any other health care providers outside of the [address] [place] [address] since your last visit? Include any pap smears or colon screening.  No                                                                 Patient presents with wound vac sponge intact stating it has not been changed since surgery complaints of pain 10                    Ms. Cecilia Hernandez has been given the following recommendations today due to her elevated BP reading: patient instructed to louise pcp regarding blood pressure appt 3780 in am

## 2019-10-24 ENCOUNTER — HOME HEALTH ADMISSION (OUTPATIENT)
Dept: HOME HEALTH SERVICES | Facility: HOME HEALTH | Age: 54
End: 2019-10-24
Payer: MEDICAID

## 2019-10-25 NOTE — PROGRESS NOTES
[facility]  General Surgery    Name: Mike Primrose MRN: [id number]   : 1965 Hospital: [facility]   Date: 10/25/2019 Admission Date: No admission date for patient encounter. Subjective:  Patient returns today for removal of the wound VAC. She states that there is a odor from the wound. Objective:  Vitals:    10/23/19 1445 10/23/19 1500   BP: 182/88 (!) 192/94   Pulse: 76    Resp: 24    Temp: 96.3 °F (35.7 °C)    SpO2: 99%    Weight: 84.4 kg (186 lb)    Height: 4' 11\" (1.499 m)        Physical Exam:    General: Awake and alert, agitated but no distress   Abdomen: abdomen is soft with midline incisional tenderness. The lower 3 cm of the incision present a rise in the left side of the incision above the right side with healthy pink tissue present in the subcutaneous and skin of that margin of the wound. No open wound communicating with the abdomen is noted. No masses, organomegaly or guarding    Current Medications:  Current Outpatient Medications   Medication Sig Dispense Refill    oxyCODONE-acetaminophen (PERCOCET) 5-325 mg per tablet Take 1 Tab by mouth every six (6) hours as needed for Pain for up to 7 days. Max Daily Amount: 4 Tabs. Indications: pain 25 Tab 0    docusate sodium (COLACE) 100 mg capsule Take 1 Cap by mouth two (2) times a day. 120 Cap 0    ondansetron (ZOFRAN ODT) 4 mg disintegrating tablet Take 1 Tab by mouth every eight (8) hours as needed for Nausea. Indications: prevent nausea and vomiting after surgery 20 Tab 0    hydroCHLOROthiazide (HYDRODIURIL) 50 mg tablet Take 25 mg by mouth daily.  QUEtiapine (SEROQUEL) 300 mg tablet Take 300 mg by mouth nightly. Indications: States she lost her daughter      albuterol sulfate 90 mcg/actuation aepb Take 2 Puffs by inhalation every four (4) hours as needed for Cough or Other (Wheezing).  1 Inhaler 0    fluticasone furoate-vilanterol (BREO ELLIPTA) 200-25 mcg/dose inhaler Take 1 Puff by inhalation daily.      lisinopril (PRINIVIL, ZESTRIL) 20 mg tablet Take 20 mg by mouth daily.  amLODIPine (NORVASC) 10 mg tablet Take 1 Tab by mouth daily. 30 Tab 0    butalbital-acetaminophen-caff (FIORICET) -40 mg per capsule Take 1 Cap by mouth every four (4) hours as needed for Pain. 10 Cap 0    arformoterol (BROVANA) 15 mcg/2 mL nebu neb solution 2 mL by Nebulization route two (2) times a day. 60 Vial 0    budesonide (PULMICORT) 0.5 mg/2 mL nbsp 2 mL by Nebulization route two (2) times a day. 60 Each 0    tiotropium (SPIRIVA) 18 mcg inhalation capsule Take 1 Cap by inhalation daily. 30 Cap 0    atorvastatin (LIPITOR) 20 mg tablet Take 1 Tab by mouth nightly. 30 Tab 0    cloNIDine HCl (CATAPRES) 0.2 mg tablet Take 1 Tab by mouth two (2) times a day. 60 Tab 0    escitalopram oxalate (LEXAPRO) 10 mg tablet Take 1 Tab by mouth every evening. 30 Tab 0    metoprolol tartrate (LOPRESSOR) 25 mg tablet Take 0.5 Tabs by mouth every twelve (12) hours. 30 Tab 0    roflumilast (DALIRESP) 500 mcg tab tablet Take 1 Tab by mouth daily. 30 Tab 0    budesonide-formoterol (SYMBICORT) 160-4.5 mcg/actuation HFAA Take 2 Puffs by inhalation two (2) times a day. 1 Inhaler 1    ALPRAZolam (XANAX) 1 mg tablet Take 1 Tab by mouth two (2) times a day. Max Daily Amount: 2 mg. 20 Tab 0    famotidine (PEPCID) 20 mg tablet Take 1 Tab by mouth daily. 30 Tab 1    montelukast (SINGULAIR) 10 mg tablet Take 1 Tab by mouth nightly. 30 Tab 1    ibuprofen (MOTRIN) 800 mg tablet Take 1 Tab by mouth three (3) times daily as needed for Pain. 40 Tab 0    OTHER Incentive spirometry- use as directed 1 Each 0    naloxone (NARCAN) 4 mg/actuation nasal spray Use 1 spray intranasally, then discard. Repeat with new spray every 2 min as needed for opioid overdose symptoms, alternating nostrils. 1 Each 0       Chart and notes reviewed. Data reviewed. I have evaluated and examined the patient.         IMPRESSION:   · Patient is 1 week out from exploratory laparotomy with lysis of adhesions and primary repair of incarcerated incisional hernia. PLAN:/DISCUSION:   · Staples were removed from the wound. · Orders for Aquacel to the lower portion of the incision with every other day changes were sent to home health. · The patient will follow with us in 2 weeks.         Collins Castillo MD

## 2019-10-26 ENCOUNTER — HOME CARE VISIT (OUTPATIENT)
Dept: HOME HEALTH SERVICES | Facility: HOME HEALTH | Age: 54
End: 2019-10-26
Payer: MEDICAID

## 2019-10-27 ENCOUNTER — HOME CARE VISIT (OUTPATIENT)
Dept: SCHEDULING | Facility: HOME HEALTH | Age: 54
End: 2019-10-27
Payer: MEDICAID

## 2019-10-27 PROCEDURE — G0299 HHS/HOSPICE OF RN EA 15 MIN: HCPCS

## 2019-10-27 PROCEDURE — 400013 HH SOC

## 2019-10-28 VITALS
HEART RATE: 105 BPM | SYSTOLIC BLOOD PRESSURE: 142 MMHG | DIASTOLIC BLOOD PRESSURE: 90 MMHG | OXYGEN SATURATION: 94 % | RESPIRATION RATE: 18 BRPM | TEMPERATURE: 97.4 F

## 2019-10-29 ENCOUNTER — HOME CARE VISIT (OUTPATIENT)
Dept: HOME HEALTH SERVICES | Facility: HOME HEALTH | Age: 54
End: 2019-10-29
Payer: MEDICAID

## 2019-10-30 ENCOUNTER — HOME CARE VISIT (OUTPATIENT)
Dept: SCHEDULING | Facility: HOME HEALTH | Age: 54
End: 2019-10-30
Payer: MEDICAID

## 2019-10-31 ENCOUNTER — APPOINTMENT (OUTPATIENT)
Dept: CT IMAGING | Age: 54
DRG: 247 | End: 2019-10-31
Attending: EMERGENCY MEDICINE
Payer: MEDICAID

## 2019-10-31 ENCOUNTER — APPOINTMENT (OUTPATIENT)
Dept: GENERAL RADIOLOGY | Age: 54
DRG: 247 | End: 2019-10-31
Attending: EMERGENCY MEDICINE
Payer: MEDICAID

## 2019-10-31 ENCOUNTER — HOSPITAL ENCOUNTER (INPATIENT)
Age: 54
LOS: 3 days | Discharge: HOME | DRG: 247 | End: 2019-11-03
Attending: EMERGENCY MEDICINE | Admitting: SURGERY
Payer: MEDICAID

## 2019-10-31 DIAGNOSIS — Z87.19 S/P HERNIA REPAIR: ICD-10-CM

## 2019-10-31 DIAGNOSIS — R11.14 BILIOUS VOMITING WITH NAUSEA: ICD-10-CM

## 2019-10-31 DIAGNOSIS — R10.84 ABDOMINAL PAIN, GENERALIZED: ICD-10-CM

## 2019-10-31 DIAGNOSIS — K56.609 SBO (SMALL BOWEL OBSTRUCTION) (HCC): Primary | ICD-10-CM

## 2019-10-31 DIAGNOSIS — Z98.890 S/P HERNIA REPAIR: ICD-10-CM

## 2019-10-31 PROBLEM — K56.600 PARTIAL SMALL BOWEL OBSTRUCTION (HCC): Status: ACTIVE | Noted: 2019-10-31

## 2019-10-31 PROBLEM — N18.30 CKD (CHRONIC KIDNEY DISEASE) STAGE 3, GFR 30-59 ML/MIN (HCC): Status: ACTIVE | Noted: 2019-10-31

## 2019-10-31 PROBLEM — R10.9 ABDOMINAL PAIN: Status: ACTIVE | Noted: 2019-10-31

## 2019-10-31 LAB
ALBUMIN SERPL-MCNC: 3.8 G/DL (ref 3.4–5)
ALBUMIN/GLOB SERPL: 1.1 {RATIO} (ref 0.8–1.7)
ALP SERPL-CCNC: 103 U/L (ref 45–117)
ALT SERPL-CCNC: 17 U/L (ref 13–56)
ANION GAP SERPL CALC-SCNC: 6 MMOL/L (ref 3–18)
APPEARANCE UR: ABNORMAL
AST SERPL-CCNC: 21 U/L (ref 10–38)
BACTERIA URNS QL MICRO: ABNORMAL /HPF
BASOPHILS # BLD: 0 K/UL (ref 0–0.1)
BASOPHILS NFR BLD: 0 % (ref 0–2)
BILIRUB SERPL-MCNC: 0.3 MG/DL (ref 0.2–1)
BILIRUB UR QL: NEGATIVE
BUN SERPL-MCNC: 22 MG/DL (ref 7–18)
BUN/CREAT SERPL: 15 (ref 12–20)
CALCIUM SERPL-MCNC: 9.2 MG/DL (ref 8.5–10.1)
CHLORIDE SERPL-SCNC: 102 MMOL/L (ref 100–111)
CO2 SERPL-SCNC: 30 MMOL/L (ref 21–32)
COLOR UR: YELLOW
CREAT SERPL-MCNC: 1.48 MG/DL (ref 0.6–1.3)
DIFFERENTIAL METHOD BLD: NORMAL
EOSINOPHIL # BLD: 0.1 K/UL (ref 0–0.4)
EOSINOPHIL NFR BLD: 2 % (ref 0–5)
EPITH CASTS URNS QL MICRO: ABNORMAL /LPF (ref 0–5)
ERYTHROCYTE [DISTWIDTH] IN BLOOD BY AUTOMATED COUNT: 12.4 % (ref 11.6–14.5)
GLOBULIN SER CALC-MCNC: 3.5 G/DL (ref 2–4)
GLUCOSE SERPL-MCNC: 98 MG/DL (ref 74–99)
GLUCOSE UR STRIP.AUTO-MCNC: NEGATIVE MG/DL
HCT VFR BLD AUTO: 38.2 % (ref 35–45)
HGB BLD-MCNC: 12.7 G/DL (ref 12–16)
HGB UR QL STRIP: NEGATIVE
HYALINE CASTS URNS QL MICRO: ABNORMAL /LPF (ref 0–2)
KETONES UR QL STRIP.AUTO: ABNORMAL MG/DL
LACTATE BLD-SCNC: 0.51 MMOL/L (ref 0.4–2)
LEUKOCYTE ESTERASE UR QL STRIP.AUTO: ABNORMAL
LIPASE SERPL-CCNC: 71 U/L (ref 73–393)
LYMPHOCYTES # BLD: 1.8 K/UL (ref 0.9–3.6)
LYMPHOCYTES NFR BLD: 30 % (ref 21–52)
MCH RBC QN AUTO: 29.3 PG (ref 24–34)
MCHC RBC AUTO-ENTMCNC: 33.2 G/DL (ref 31–37)
MCV RBC AUTO: 88 FL (ref 74–97)
MONOCYTES # BLD: 0.5 K/UL (ref 0.05–1.2)
MONOCYTES NFR BLD: 8 % (ref 3–10)
MUCOUS THREADS URNS QL MICRO: ABNORMAL /LPF
NEUTS SEG # BLD: 3.7 K/UL (ref 1.8–8)
NEUTS SEG NFR BLD: 60 % (ref 40–73)
NITRITE UR QL STRIP.AUTO: NEGATIVE
PH UR STRIP: 5.5 [PH] (ref 5–8)
PLATELET # BLD AUTO: 371 K/UL (ref 135–420)
PMV BLD AUTO: 10.9 FL (ref 9.2–11.8)
POTASSIUM SERPL-SCNC: 3.7 MMOL/L (ref 3.5–5.5)
PROT SERPL-MCNC: 7.3 G/DL (ref 6.4–8.2)
PROT UR STRIP-MCNC: 30 MG/DL
RBC # BLD AUTO: 4.34 M/UL (ref 4.2–5.3)
RBC #/AREA URNS HPF: ABNORMAL /HPF (ref 0–5)
SODIUM SERPL-SCNC: 138 MMOL/L (ref 136–145)
SP GR UR REFRACTOMETRY: >1.03 (ref 1–1.03)
UROBILINOGEN UR QL STRIP.AUTO: 1 EU/DL (ref 0.2–1)
WBC # BLD AUTO: 6.2 K/UL (ref 4.6–13.2)
WBC URNS QL MICRO: ABNORMAL /HPF (ref 0–5)

## 2019-10-31 PROCEDURE — 74011000258 HC RX REV CODE- 258: Performed by: EMERGENCY MEDICINE

## 2019-10-31 PROCEDURE — 80053 COMPREHEN METABOLIC PANEL: CPT

## 2019-10-31 PROCEDURE — 81001 URINALYSIS AUTO W/SCOPE: CPT

## 2019-10-31 PROCEDURE — 99285 EMERGENCY DEPT VISIT HI MDM: CPT

## 2019-10-31 PROCEDURE — 85025 COMPLETE CBC W/AUTO DIFF WBC: CPT

## 2019-10-31 PROCEDURE — 83605 ASSAY OF LACTIC ACID: CPT

## 2019-10-31 PROCEDURE — 74011636320 HC RX REV CODE- 636/320: Performed by: EMERGENCY MEDICINE

## 2019-10-31 PROCEDURE — 96375 TX/PRO/DX INJ NEW DRUG ADDON: CPT

## 2019-10-31 PROCEDURE — 74011250636 HC RX REV CODE- 250/636: Performed by: PHYSICIAN ASSISTANT

## 2019-10-31 PROCEDURE — 74177 CT ABD & PELVIS W/CONTRAST: CPT

## 2019-10-31 PROCEDURE — 96374 THER/PROPH/DIAG INJ IV PUSH: CPT

## 2019-10-31 PROCEDURE — 87040 BLOOD CULTURE FOR BACTERIA: CPT

## 2019-10-31 PROCEDURE — 83690 ASSAY OF LIPASE: CPT

## 2019-10-31 PROCEDURE — 65270000029 HC RM PRIVATE

## 2019-10-31 PROCEDURE — 74011250636 HC RX REV CODE- 250/636: Performed by: EMERGENCY MEDICINE

## 2019-10-31 PROCEDURE — 74018 RADEX ABDOMEN 1 VIEW: CPT

## 2019-10-31 RX ORDER — NALOXONE HYDROCHLORIDE 0.4 MG/ML
0.4 INJECTION, SOLUTION INTRAMUSCULAR; INTRAVENOUS; SUBCUTANEOUS AS NEEDED
Status: DISCONTINUED | OUTPATIENT
Start: 2019-10-31 | End: 2019-11-03 | Stop reason: HOSPADM

## 2019-10-31 RX ORDER — SODIUM CHLORIDE 0.9 % (FLUSH) 0.9 %
5-40 SYRINGE (ML) INJECTION EVERY 8 HOURS
Status: DISCONTINUED | OUTPATIENT
Start: 2019-10-31 | End: 2019-11-03 | Stop reason: HOSPADM

## 2019-10-31 RX ORDER — HYDROMORPHONE HYDROCHLORIDE 1 MG/ML
1 INJECTION, SOLUTION INTRAMUSCULAR; INTRAVENOUS; SUBCUTANEOUS ONCE
Status: COMPLETED | OUTPATIENT
Start: 2019-10-31 | End: 2019-11-01

## 2019-10-31 RX ORDER — LORAZEPAM 2 MG/ML
1 INJECTION INTRAMUSCULAR
Status: DISCONTINUED | OUTPATIENT
Start: 2019-10-31 | End: 2019-11-03 | Stop reason: HOSPADM

## 2019-10-31 RX ORDER — ONDANSETRON 2 MG/ML
4 INJECTION INTRAMUSCULAR; INTRAVENOUS
Status: COMPLETED | OUTPATIENT
Start: 2019-10-31 | End: 2019-10-31

## 2019-10-31 RX ORDER — DICYCLOMINE HYDROCHLORIDE 10 MG/1
10 CAPSULE ORAL
Status: DISCONTINUED | OUTPATIENT
Start: 2019-10-31 | End: 2019-11-03 | Stop reason: HOSPADM

## 2019-10-31 RX ORDER — MORPHINE SULFATE 10 MG/ML
8 INJECTION, SOLUTION INTRAMUSCULAR; INTRAVENOUS ONCE
Status: COMPLETED | OUTPATIENT
Start: 2019-10-31 | End: 2019-10-31

## 2019-10-31 RX ORDER — DIPHENHYDRAMINE HYDROCHLORIDE 50 MG/ML
12.5 INJECTION, SOLUTION INTRAMUSCULAR; INTRAVENOUS
Status: DISCONTINUED | OUTPATIENT
Start: 2019-10-31 | End: 2019-11-03 | Stop reason: HOSPADM

## 2019-10-31 RX ORDER — HYDRALAZINE HYDROCHLORIDE 20 MG/ML
10 INJECTION INTRAMUSCULAR; INTRAVENOUS
Status: DISCONTINUED | OUTPATIENT
Start: 2019-10-31 | End: 2019-11-01

## 2019-10-31 RX ORDER — FLUCONAZOLE 200 MG/1
200 TABLET ORAL
Status: DISCONTINUED | OUTPATIENT
Start: 2019-10-31 | End: 2019-11-01

## 2019-10-31 RX ORDER — SODIUM CHLORIDE 0.9 % (FLUSH) 0.9 %
5-40 SYRINGE (ML) INJECTION AS NEEDED
Status: DISCONTINUED | OUTPATIENT
Start: 2019-10-31 | End: 2019-11-03 | Stop reason: HOSPADM

## 2019-10-31 RX ORDER — HYDROMORPHONE HYDROCHLORIDE 1 MG/ML
1 INJECTION, SOLUTION INTRAMUSCULAR; INTRAVENOUS; SUBCUTANEOUS
Status: DISCONTINUED | OUTPATIENT
Start: 2019-10-31 | End: 2019-11-01

## 2019-10-31 RX ORDER — ENOXAPARIN SODIUM 100 MG/ML
40 INJECTION SUBCUTANEOUS EVERY 24 HOURS
Status: DISCONTINUED | OUTPATIENT
Start: 2019-10-31 | End: 2019-11-03 | Stop reason: HOSPADM

## 2019-10-31 RX ORDER — IPRATROPIUM BROMIDE AND ALBUTEROL SULFATE 2.5; .5 MG/3ML; MG/3ML
3 SOLUTION RESPIRATORY (INHALATION)
Status: DISCONTINUED | OUTPATIENT
Start: 2019-11-01 | End: 2019-11-02

## 2019-10-31 RX ORDER — SODIUM CHLORIDE 9 MG/ML
150 INJECTION, SOLUTION INTRAVENOUS CONTINUOUS
Status: DISCONTINUED | OUTPATIENT
Start: 2019-10-31 | End: 2019-11-02

## 2019-10-31 RX ORDER — SODIUM CHLORIDE 9 MG/ML
125 INJECTION, SOLUTION INTRAVENOUS CONTINUOUS
Status: DISCONTINUED | OUTPATIENT
Start: 2019-10-31 | End: 2019-10-31

## 2019-10-31 RX ORDER — CIPROFLOXACIN 750 MG/1
750 TABLET, FILM COATED ORAL EVERY 12 HOURS
Status: DISCONTINUED | OUTPATIENT
Start: 2019-10-31 | End: 2019-11-01

## 2019-10-31 RX ORDER — IPRATROPIUM BROMIDE AND ALBUTEROL SULFATE 2.5; .5 MG/3ML; MG/3ML
3 SOLUTION RESPIRATORY (INHALATION)
Status: DISCONTINUED | OUTPATIENT
Start: 2019-10-31 | End: 2019-10-31

## 2019-10-31 RX ADMIN — ONDANSETRON 4 MG: 2 INJECTION INTRAMUSCULAR; INTRAVENOUS at 20:25

## 2019-10-31 RX ADMIN — IOPAMIDOL 70 ML: 612 INJECTION, SOLUTION INTRAVENOUS at 21:36

## 2019-10-31 RX ADMIN — PIPERACILLIN AND TAZOBACTAM 3.38 G: 3; .375 INJECTION, POWDER, LYOPHILIZED, FOR SOLUTION INTRAVENOUS at 23:10

## 2019-10-31 RX ADMIN — MORPHINE SULFATE 8 MG: 10 INJECTION INTRAVENOUS at 20:26

## 2019-10-31 RX ADMIN — SODIUM CHLORIDE 1000 ML: 900 INJECTION, SOLUTION INTRAVENOUS at 20:28

## 2019-10-31 RX ADMIN — SODIUM CHLORIDE 150 ML/HR: 900 INJECTION, SOLUTION INTRAVENOUS at 20:28

## 2019-10-31 NOTE — ED TRIAGE NOTES
\"I had surgery 2 weeks ago, but I'm still have belly pain and I'm vomiting. \" Post-op hernia repair.

## 2019-10-31 NOTE — ED NOTES
Pt states \"I had a hernia repair 2 weeks ago. I changed my dressing 2 days ago and noticed a smell. I don't know if it is infected\". Pt complains of pain at the incision site and nauseous.

## 2019-11-01 ENCOUNTER — HOME CARE VISIT (OUTPATIENT)
Dept: SCHEDULING | Facility: HOME HEALTH | Age: 54
End: 2019-11-01
Payer: MEDICAID

## 2019-11-01 ENCOUNTER — APPOINTMENT (OUTPATIENT)
Dept: GENERAL RADIOLOGY | Age: 54
DRG: 247 | End: 2019-11-01
Attending: SURGERY
Payer: MEDICAID

## 2019-11-01 ENCOUNTER — APPOINTMENT (OUTPATIENT)
Dept: GENERAL RADIOLOGY | Age: 54
DRG: 247 | End: 2019-11-01
Attending: EMERGENCY MEDICINE
Payer: MEDICAID

## 2019-11-01 PROBLEM — K56.7 ILEUS (HCC): Status: ACTIVE | Noted: 2019-10-31

## 2019-11-01 LAB
ALBUMIN SERPL-MCNC: 2.9 G/DL (ref 3.4–5)
ALBUMIN/GLOB SERPL: 0.9 {RATIO} (ref 0.8–1.7)
ALP SERPL-CCNC: 86 U/L (ref 45–117)
ALT SERPL-CCNC: 11 U/L (ref 13–56)
ANION GAP SERPL CALC-SCNC: 5 MMOL/L (ref 3–18)
AST SERPL-CCNC: 11 U/L (ref 10–38)
BASOPHILS # BLD: 0 K/UL (ref 0–0.1)
BASOPHILS NFR BLD: 0 % (ref 0–2)
BILIRUB SERPL-MCNC: 0.3 MG/DL (ref 0.2–1)
BUN SERPL-MCNC: 17 MG/DL (ref 7–18)
BUN/CREAT SERPL: 14 (ref 12–20)
CALCIUM SERPL-MCNC: 8.1 MG/DL (ref 8.5–10.1)
CHLORIDE SERPL-SCNC: 105 MMOL/L (ref 100–111)
CO2 SERPL-SCNC: 30 MMOL/L (ref 21–32)
CREAT SERPL-MCNC: 1.25 MG/DL (ref 0.6–1.3)
DIFFERENTIAL METHOD BLD: ABNORMAL
EOSINOPHIL # BLD: 0.1 K/UL (ref 0–0.4)
EOSINOPHIL NFR BLD: 2 % (ref 0–5)
ERYTHROCYTE [DISTWIDTH] IN BLOOD BY AUTOMATED COUNT: 12.4 % (ref 11.6–14.5)
GLOBULIN SER CALC-MCNC: 3.4 G/DL (ref 2–4)
GLUCOSE SERPL-MCNC: 84 MG/DL (ref 74–99)
HCT VFR BLD AUTO: 35.6 % (ref 35–45)
HGB BLD-MCNC: 11.6 G/DL (ref 12–16)
LACTATE SERPL-SCNC: 0.5 MMOL/L (ref 0.4–2)
LIPASE SERPL-CCNC: 68 U/L (ref 73–393)
LYMPHOCYTES # BLD: 1.5 K/UL (ref 0.9–3.6)
LYMPHOCYTES NFR BLD: 32 % (ref 21–52)
MAGNESIUM SERPL-MCNC: 2 MG/DL (ref 1.6–2.6)
MCH RBC QN AUTO: 28.7 PG (ref 24–34)
MCHC RBC AUTO-ENTMCNC: 32.6 G/DL (ref 31–37)
MCV RBC AUTO: 88.1 FL (ref 74–97)
MONOCYTES # BLD: 0.4 K/UL (ref 0.05–1.2)
MONOCYTES NFR BLD: 9 % (ref 3–10)
NEUTS SEG # BLD: 2.6 K/UL (ref 1.8–8)
NEUTS SEG NFR BLD: 57 % (ref 40–73)
PHOSPHATE SERPL-MCNC: 3.2 MG/DL (ref 2.5–4.9)
PLATELET # BLD AUTO: 296 K/UL (ref 135–420)
PMV BLD AUTO: 10.9 FL (ref 9.2–11.8)
POTASSIUM SERPL-SCNC: 3.1 MMOL/L (ref 3.5–5.5)
PROT SERPL-MCNC: 6.3 G/DL (ref 6.4–8.2)
RBC # BLD AUTO: 4.04 M/UL (ref 4.2–5.3)
SODIUM SERPL-SCNC: 140 MMOL/L (ref 136–145)
WBC # BLD AUTO: 4.6 K/UL (ref 4.6–13.2)

## 2019-11-01 PROCEDURE — 83690 ASSAY OF LIPASE: CPT

## 2019-11-01 PROCEDURE — 74011000250 HC RX REV CODE- 250: Performed by: HOSPITALIST

## 2019-11-01 PROCEDURE — 71045 X-RAY EXAM CHEST 1 VIEW: CPT

## 2019-11-01 PROCEDURE — 94640 AIRWAY INHALATION TREATMENT: CPT

## 2019-11-01 PROCEDURE — 74011250636 HC RX REV CODE- 250/636: Performed by: EMERGENCY MEDICINE

## 2019-11-01 PROCEDURE — 83605 ASSAY OF LACTIC ACID: CPT

## 2019-11-01 PROCEDURE — 84100 ASSAY OF PHOSPHORUS: CPT

## 2019-11-01 PROCEDURE — 74011250636 HC RX REV CODE- 250/636: Performed by: INTERNAL MEDICINE

## 2019-11-01 PROCEDURE — 74022 RADEX COMPL AQT ABD SERIES: CPT

## 2019-11-01 PROCEDURE — 74011000258 HC RX REV CODE- 258: Performed by: INTERNAL MEDICINE

## 2019-11-01 PROCEDURE — 36415 COLL VENOUS BLD VENIPUNCTURE: CPT

## 2019-11-01 PROCEDURE — 74011000250 HC RX REV CODE- 250: Performed by: SURGERY

## 2019-11-01 PROCEDURE — 85025 COMPLETE CBC W/AUTO DIFF WBC: CPT

## 2019-11-01 PROCEDURE — 65270000029 HC RM PRIVATE

## 2019-11-01 PROCEDURE — 74011000250 HC RX REV CODE- 250: Performed by: INTERNAL MEDICINE

## 2019-11-01 PROCEDURE — 74011250636 HC RX REV CODE- 250/636: Performed by: SURGERY

## 2019-11-01 PROCEDURE — 83735 ASSAY OF MAGNESIUM: CPT

## 2019-11-01 PROCEDURE — 74011250637 HC RX REV CODE- 250/637: Performed by: SURGERY

## 2019-11-01 PROCEDURE — 80053 COMPREHEN METABOLIC PANEL: CPT

## 2019-11-01 RX ORDER — FAMOTIDINE 20 MG/50ML
20 INJECTION, SOLUTION INTRAVENOUS EVERY 12 HOURS
Status: DISCONTINUED | OUTPATIENT
Start: 2019-11-01 | End: 2019-11-01

## 2019-11-01 RX ORDER — FENTANYL 75 UG/H
1 PATCH TRANSDERMAL
Status: DISCONTINUED | OUTPATIENT
Start: 2019-11-01 | End: 2019-11-03 | Stop reason: HOSPADM

## 2019-11-01 RX ORDER — FLUCONAZOLE 2 MG/ML
200 INJECTION, SOLUTION INTRAVENOUS ONCE
Status: COMPLETED | OUTPATIENT
Start: 2019-11-01 | End: 2019-11-01

## 2019-11-01 RX ORDER — METOCLOPRAMIDE HYDROCHLORIDE 5 MG/ML
5 INJECTION INTRAMUSCULAR; INTRAVENOUS EVERY 6 HOURS
Status: DISCONTINUED | OUTPATIENT
Start: 2019-11-01 | End: 2019-11-01

## 2019-11-01 RX ORDER — HYDRALAZINE HYDROCHLORIDE 20 MG/ML
20 INJECTION INTRAMUSCULAR; INTRAVENOUS
Status: DISCONTINUED | OUTPATIENT
Start: 2019-11-01 | End: 2019-11-03 | Stop reason: HOSPADM

## 2019-11-01 RX ORDER — METOCLOPRAMIDE HYDROCHLORIDE 5 MG/ML
5 INJECTION INTRAMUSCULAR; INTRAVENOUS EVERY 6 HOURS
Status: DISCONTINUED | OUTPATIENT
Start: 2019-11-01 | End: 2019-11-03 | Stop reason: HOSPADM

## 2019-11-01 RX ORDER — LIDOCAINE HYDROCHLORIDE 20 MG/ML
15 SOLUTION OROPHARYNGEAL
Status: DISCONTINUED | OUTPATIENT
Start: 2019-11-01 | End: 2019-11-01

## 2019-11-01 RX ORDER — CIPROFLOXACIN 750 MG/1
750 TABLET, FILM COATED ORAL EVERY 12 HOURS
Status: DISCONTINUED | OUTPATIENT
Start: 2019-11-01 | End: 2019-11-01

## 2019-11-01 RX ORDER — CIPROFLOXACIN 2 MG/ML
400 INJECTION, SOLUTION INTRAVENOUS EVERY 12 HOURS
Status: DISCONTINUED | OUTPATIENT
Start: 2019-11-01 | End: 2019-11-03 | Stop reason: HOSPADM

## 2019-11-01 RX ORDER — KETOROLAC TROMETHAMINE 15 MG/ML
15 INJECTION, SOLUTION INTRAMUSCULAR; INTRAVENOUS EVERY 6 HOURS
Status: DISCONTINUED | OUTPATIENT
Start: 2019-11-01 | End: 2019-11-03 | Stop reason: HOSPADM

## 2019-11-01 RX ORDER — FLUCONAZOLE 200 MG/1
200 TABLET ORAL
Status: DISCONTINUED | OUTPATIENT
Start: 2019-11-01 | End: 2019-11-01

## 2019-11-01 RX ADMIN — METOCLOPRAMIDE 5 MG: 5 INJECTION, SOLUTION INTRAMUSCULAR; INTRAVENOUS at 17:41

## 2019-11-01 RX ADMIN — HYDROMORPHONE HYDROCHLORIDE 1 MG: 1 INJECTION, SOLUTION INTRAMUSCULAR; INTRAVENOUS; SUBCUTANEOUS at 00:06

## 2019-11-01 RX ADMIN — SODIUM CHLORIDE 150 ML/HR: 900 INJECTION, SOLUTION INTRAVENOUS at 02:02

## 2019-11-01 RX ADMIN — FLUCONAZOLE, SODIUM CHLORIDE 200 MG: 2 INJECTION INTRAVENOUS at 08:43

## 2019-11-01 RX ADMIN — METOCLOPRAMIDE 5 MG: 5 INJECTION, SOLUTION INTRAMUSCULAR; INTRAVENOUS at 23:59

## 2019-11-01 RX ADMIN — Medication 10 ML: at 21:07

## 2019-11-01 RX ADMIN — ENOXAPARIN SODIUM 40 MG: 40 INJECTION SUBCUTANEOUS at 23:56

## 2019-11-01 RX ADMIN — METOCLOPRAMIDE 5 MG: 5 INJECTION, SOLUTION INTRAMUSCULAR; INTRAVENOUS at 07:01

## 2019-11-01 RX ADMIN — LORAZEPAM 1 MG: 2 INJECTION INTRAMUSCULAR; INTRAVENOUS at 00:09

## 2019-11-01 RX ADMIN — IPRATROPIUM BROMIDE AND ALBUTEROL SULFATE 3 ML: .5; 3 SOLUTION RESPIRATORY (INHALATION) at 23:41

## 2019-11-01 RX ADMIN — CIPROFLOXACIN 400 MG: 2 INJECTION, SOLUTION INTRAVENOUS at 09:54

## 2019-11-01 RX ADMIN — POTASSIUM CHLORIDE: 2 INJECTION, SOLUTION, CONCENTRATE INTRAVENOUS at 16:39

## 2019-11-01 RX ADMIN — HYDROMORPHONE HYDROCHLORIDE 1 MG: 1 INJECTION, SOLUTION INTRAMUSCULAR; INTRAVENOUS; SUBCUTANEOUS at 04:10

## 2019-11-01 RX ADMIN — KETOROLAC TROMETHAMINE 15 MG: 15 INJECTION, SOLUTION INTRAMUSCULAR; INTRAVENOUS at 17:41

## 2019-11-01 RX ADMIN — CIPROFLOXACIN 400 MG: 2 INJECTION, SOLUTION INTRAVENOUS at 21:01

## 2019-11-01 RX ADMIN — POTASSIUM CHLORIDE: 2 INJECTION, SOLUTION, CONCENTRATE INTRAVENOUS at 17:40

## 2019-11-01 RX ADMIN — KETOROLAC TROMETHAMINE 15 MG: 15 INJECTION, SOLUTION INTRAMUSCULAR; INTRAVENOUS at 23:59

## 2019-11-01 RX ADMIN — FAMOTIDINE 20 MG: 10 INJECTION INTRAVENOUS at 21:04

## 2019-11-01 RX ADMIN — KETOROLAC TROMETHAMINE 15 MG: 15 INJECTION, SOLUTION INTRAMUSCULAR; INTRAVENOUS at 08:45

## 2019-11-01 RX ADMIN — IPRATROPIUM BROMIDE AND ALBUTEROL SULFATE 3 ML: .5; 3 SOLUTION RESPIRATORY (INHALATION) at 08:19

## 2019-11-01 RX ADMIN — POTASSIUM CHLORIDE: 2 INJECTION, SOLUTION, CONCENTRATE INTRAVENOUS at 18:00

## 2019-11-01 RX ADMIN — METOCLOPRAMIDE 5 MG: 5 INJECTION, SOLUTION INTRAMUSCULAR; INTRAVENOUS at 12:52

## 2019-11-01 RX ADMIN — FAMOTIDINE 20 MG: 10 INJECTION INTRAVENOUS at 16:40

## 2019-11-01 RX ADMIN — IPRATROPIUM BROMIDE AND ALBUTEROL SULFATE 3 ML: .5; 3 SOLUTION RESPIRATORY (INHALATION) at 20:27

## 2019-11-01 RX ADMIN — KETOROLAC TROMETHAMINE 15 MG: 15 INJECTION, SOLUTION INTRAMUSCULAR; INTRAVENOUS at 12:52

## 2019-11-01 NOTE — PROGRESS NOTES
Reason for Readmission:    SBO (small bowel obstruction) (HCC) [K56.609]  Bilious vomiting [R11.14]  Abdominal pain [R10.9]  S/P hernia repair [N99.215, Z87.19]  Partial small bowel obstruction (HCC) [K56.600]         RRAT Score and Risk Level:     18     Level of Readmission:    3   (1 - First Readmission, 2- Second Readmission within 30 days or multiple admissions over previous 90 days, 3- Greater than two readmissions within 30 days or multiple admissions over previous 90 days)      Care Conference scheduled:   (consider for all patient's with readmission level of 2, should definitely be scheduled for all patients with readmission level of 3)       Resources/supports as identified by patient/family:  Family and personal care aide are support sysytem. Top Challenges facing patient (as identified by patient/family and CM): Finances/Medication cost?   No concerns voiced. Transportation      Family or medicaid   Support system or lack thereof? Good support system  Living arrangements? Lives with family  Self-care/ADLs/Cognition? Needs some assistance. Alert & oriented. Current Advanced Directive/Advance Care Plan:  tobin           Plan for utilizing home health:   no.             Likelihood of additional readmission:                Transition of Care Plan:    Based on readmission, the patient's previous Plan of Care   has been evaluated and/or modified. The current Transition of Care Plan is:            Initial assessment completed with patient. Cognitive status of patient: oriented to time, place, person and situation. Face sheet information confirmed:  yes. The patient designates Mayela Wright, daughter ([phone]) to participate in her discharge plan and to receive any needed information. This patient lives in a single family home with family. Patient was able to navigate steps as needed.   Prior to hospitalization, patient was considered to be independent with ADLs/IADLS Jayson Escobar no . If not independent,  patient needs assist with : dressing, bathing, food preparation, cooking, toileting and grooming. Personal care aide with [facility] with 30 hrs 7 days a week. Patient has a current ACP document on file: yes  The patient's family or medicaid cab will be available to transport patient home upon discharge. The patient already has Walker, CPAP, and oxygen concentator and tanks ( 2 LNC ) medical equipment available in the home. Patient is not currently active with home health. Patient has not stayed in a skilled nursing facility or rehab. This patient is on dialysis :no     Freedom of choice signed: no.     Currently, the discharge plan is Home with family and personal care aide assistance. CM will continue to monitor for transitional needs. The patient states that she can obtain her medications from the pharmacy, and take her medications as directed. [facility] [employer]      Care Management Interventions  PCP Verified by CM: Yes  Last Visit to PCP: 10/23/19  Mode of Transport at Discharge: Self  Transition of Care Consult (CM Consult): Discharge Planning  MyChart Signup: No  Discharge Durable Medical Equipment: No  Physical Therapy Consult: No  Occupational Therapy Consult: No  Speech Therapy Consult: No  Current Support Network: Lives with Caregiver  Confirm Follow Up Transport: Self  Plan discussed with Pt/Family/Caregiver: Yes  Discharge Location  Discharge Placement: Home with family assistance        LINDSEY Tobias, RN  Pager # [phone]  Care Manager

## 2019-11-01 NOTE — PROGRESS NOTES
SUBJECTIVE:    Patient was seen earlier today. Continues to have some abdominal pain. No nausea or vomiting. NG tube is in situ. She was passing flatus. No bowel movement. No chest pain or shortness of breath or cough. OBJECTIVE:    /77   Pulse 60   Temp 98.5 °F (36.9 °C)   Resp 18   Ht 4' 11\" (1.499 m)   Wt 77.1 kg (170 lb)   LMP 04/14/2009   SpO2 95%   Breastfeeding? No   BMI 34.34 kg/m²     General appearance - alert, well appearing, and in no distress  Chest - decreased air entry noted in bases, no wheezes  Heart - S1 and S2 normal  Abdomen - soft, epigastric tenderness present, NG tube in situ, nondistended, hypoactive bowel sounds present  Neurological - alert, oriented, normal speech, no focal findings noted while in bed  Extremities - no pedal edema noted    ASSESSMENT:    1. Partial small bowel obstruction and status post hernia repair on October 28, 2019, clinically stable  2. Hypertension  3. COPD without exacerbation  4. Anxiety and depression  5. Hypokalemia  6. Stage III chronic kidney disease  7.   GERD    PLAN:    Continue current management  We will replace potassium  Continue hydralazine IV as needed for hypertension  Check labs in the morning    BMP:   Lab Results   Component Value Date/Time     11/01/2019 05:03 AM    K 3.1 (L) 11/01/2019 05:03 AM     11/01/2019 05:03 AM    CO2 30 11/01/2019 05:03 AM    AGAP 5 11/01/2019 05:03 AM    GLU 84 11/01/2019 05:03 AM    BUN 17 11/01/2019 05:03 AM    CREA 1.25 11/01/2019 05:03 AM    GFRAA 54 (L) 11/01/2019 05:03 AM    GFRNA 44 (L) 11/01/2019 05:03 AM     CBC:   Lab Results   Component Value Date/Time    WBC 4.6 11/01/2019 05:03 AM    HGB 11.6 (L) 11/01/2019 05:03 AM    HCT 35.6 11/01/2019 05:03 AM     11/01/2019 05:03 AM

## 2019-11-01 NOTE — PROGRESS NOTES
conducted an initial consultation and Spiritual Assessment for Donovan Brar, who is a 53 y.o.,female. Patient's Primary Language is: [place]. According to the patient's EMR Scientology Affiliation is: [place]. The reason the Patient came to the hospital is:   Patient Active Problem List    Diagnosis Date Noted    Bilious vomiting 10/31/2019    Ileus ([address] 10/31/2019    Abdominal pain 10/31/2019    S/P hernia repair 10/31/2019    Partial small bowel obstruction ([address] 10/31/2019    CKD (chronic kidney disease) stage 3, GFR 30-59 ml/min ([address] 10/31/2019    Incarcerated incisional hernia 10/13/2019    COPD (chronic obstructive pulmonary disease) ([address] 09/28/2019    Hypertensive urgency 09/28/2019    Tobacco use 09/28/2019    Acute bronchitis with chronic obstructive pulmonary disease (COPD) ([address] 03/09/2019    T wave inversion in EKG 03/09/2019    Hypertensive heart failure ([address] 12/19/2017    Sleep apnea 12/19/2017    COPD (chronic obstructive pulmonary disease) with chronic bronchitis ([address] 12/19/2017    Acute asthma exacerbation 04/21/2017    Essential hypertension 03/10/2017    Morbid obesity due to excess calories ([address] 03/10/2017    COPD with acute exacerbation ([address] 03/02/2017    Acute respiratory failure with hypercapnia (HCC) 02/26/2017    Asthma exacerbation 01/26/2017    Respiratory failure ([address] 01/11/2017    Acute encephalopathy 01/11/2017    Acute exacerbation of chronic obstructive pulmonary disease (COPD) ([address] 08/29/2016    Acute on chronic respiratory failure with hypoxemia ([address] 03/12/2016    Asthma 03/05/2016    Abnormal CT of the chest 11/20/2015    Asthma exacerbation attacks 10/19/2015    Status asthmaticus with COPD (chronic obstructive pulmonary disease) ([address] 10/19/2015        The  provided the following Interventions:  Initiated a relationship of care and support.    Explored issues of marilee, belief, spirituality and Hinduism/ritual needs while hospitalized. Listened empathically. Provided chaplaincy education. Provided information about Spiritual Care Services. Offered prayer and assurance of continued prayers on patient's behalf. Chart reviewed. The following outcomes where achieved:  Patient shared limited information about both their medical narrative and spiritual journey/beliefs.  confirmed Patient's Temple Affiliation. Patient processed feeling about current hospitalization. Patient expressed gratitude for 's visit. Assessment:  Patient does not have any Denominational/cultural needs that will affect patient's preferences in health care. There are no spiritual or Denominational issues which require intervention at this time. Plan:  Chaplains will continue to follow and will provide pastoral care on an as needed/requested basis.  recommends bedside caregivers page  on duty if patient shows signs of acute spiritual or emotional distress.     [id number] [address]   [phone]

## 2019-11-01 NOTE — CONSULTS
History & Physical    Patient: Bryn Jordan MRN: [id number]  CSN: [id number]    YOB: 1965  Age: 53 y.o. Sex: female      DOA: 10/31/2019     Date of consult: October 31, 2019    Requesting physician for consult: Dr. Amrit Wade. Reason for consult: Medical management of COPD and hypertension    Chief Complaint:   Abdominal pain nausea and vomiting       HPI:     74-year-old -American female with a past medical history significant for hypertension, hypercholesterolemia and COPD [facility] with complaints of abdominal pain and nausea and vomiting for 1 week. Patient had hernia repair by Dr. Amrit Wade 2 weeks ago on October 20, 2019. She did not have any postsurgical complication. A week later patient started having abdominal pain. Cramp-like. Intermittent. Associated with nausea and vomiting. Vomiting was bilious. It is intermittent. Denies fever. Denies abdominal trauma. No chills. No diarrhea. Patient has constipation and has not have any bowel movement in the past 4 days. She admits that she passes flatus. CT scan of abdomen revealed patient has partial small bowel obstruction    Past Medical History:   Diagnosis Date    Asthma     CHF (congestive heart failure) (HCC)     Chronic obstructive pulmonary disease (HCC)     Dependence on supplemental oxygen     Endocrine disease     thyroid issues    Gastrointestinal disorder     \"blockage in my stomach\"    Hypercholesterolemia     Hypertension        Past Surgical History:   Procedure Laterality Date    HX GI      Exploratory laparotomy with lysis of adhesions and primary repair of incarcerated umbilical hernia       No family history on file.     Social History     Socioeconomic History    Marital status: SINGLE     Spouse name: Not on file    Number of children: Not on file    Years of education: Not on file    Highest education level: Not on file   Tobacco Use    Smoking status: Current Every Day Smoker     Packs/day: 0.50    Smokeless tobacco: Former User   Substance and Sexual Activity    Alcohol use: No     Comment: socially    Drug use: Not Currently     Types: Heroin    Sexual activity: Never     Comment: last used 9 years ago       Prior to Admission medications    Medication Sig Start Date End Date Taking? Authorizing Provider   oxyCODONE-acetaminophen (PERCOCET) 5-325 mg per tablet Take 1 Tab by mouth every six (6) hours as needed for Pain for up to 7 days. Max Daily Amount: 4 Tabs. Indications: pain 10/23/19 10/30/19  Edna Iraheta MD   ibuprofen (MOTRIN) 800 mg tablet Take 1 Tab by mouth three (3) times daily as needed for Pain. 10/17/19   Edna Iraheta MD   docusate sodium (COLACE) 100 mg capsule Take 1 Cap by mouth two (2) times a day. 10/17/19   Edna Iraheta MD   ondansetron (ZOFRAN ODT) 4 mg disintegrating tablet Take 1 Tab by mouth every eight (8) hours as needed for Nausea. Indications: prevent nausea and vomiting after surgery 10/17/19   Edna Iraheta MD   hydroCHLOROthiazide (HYDRODIURIL) 50 mg tablet Take 25 mg by mouth daily. Provider, Historical   QUEtiapine (SEROQUEL) 300 mg tablet Take 300 mg by mouth nightly. Indications: States she lost her daughter    Provider, Historical   albuterol sulfate 90 mcg/actuation aepb Take 2 Puffs by inhalation every four (4) hours as needed for Cough or Other (Wheezing). 7/25/19   Pat Rothman MD   fluticasone furoate-vilanterol (BREO ELLIPTA) 200-25 mcg/dose inhaler Take 1 Puff by inhalation daily. Shalini Knox MD   lisinopril (PRINIVIL, ZESTRIL) 20 mg tablet Take 20 mg by mouth daily. Shalini Knox MD   amLODIPine (NORVASC) 10 mg tablet Take 1 Tab by mouth daily. 7/5/19   Jay Rivera MD   butalbital-acetaminophen-caff (FIORICET) -40 mg per capsule Take 1 Cap by mouth every four (4) hours as needed for Pain.  6/30/19   Rolly Ruiz PA-C   arformoterol (BROVANA) 15 mcg/2 mL nebu neb solution 2 mL by Nebulization route two (2) times a day. 3/14/19   Ildefonso Yang MD   budesonide (PULMICORT) 0.5 mg/2 mL nbsp 2 mL by Nebulization route two (2) times a day. 3/14/19   Ildefonso Yang MD   tiotropium (SPIRIVA) 18 mcg inhalation capsule Take 1 Cap by inhalation daily. 3/14/19   Ildefonso Yang MD   atorvastatin (LIPITOR) 20 mg tablet Take 1 Tab by mouth nightly. 3/14/19   Ildefonso Yang MD   cloNIDine HCl (CATAPRES) 0.2 mg tablet Take 1 Tab by mouth two (2) times a day. 3/14/19   Ildefonso Yang MD   escitalopram oxalate (LEXAPRO) 10 mg tablet Take 1 Tab by mouth every evening. 3/14/19   Ildefonso Yang MD   metoprolol tartrate (LOPRESSOR) 25 mg tablet Take 0.5 Tabs by mouth every twelve (12) hours. 3/14/19   Ildefonso Yang MD   roflumilast (DALIRESP) 500 mcg tab tablet Take 1 Tab by mouth daily. 3/15/19   Ildefonso Yang MD   OTHER Incentive spirometry- use as directed 3/14/19   Ildefonso Yang MD   naloxone [facility]) 4 mg/actuation nasal spray Use 1 spray intranasally, then discard. Repeat with new spray every 2 min as needed for opioid overdose symptoms, alternating nostrils. 3/14/19   Ildefonso Yang MD   budesonide-formoterol (SYMBICORT) 160-4.5 mcg/actuation HFAA Take 2 Puffs by inhalation two (2) times a day. 8/23/18   Je Palencia DO   ALPRAZolam [place]) 1 mg tablet Take 1 Tab by mouth two (2) times a day. Max Daily Amount: 2 mg. 4/24/17   Luci Walters MD   famotidine (PEPCID) 20 mg tablet Take 1 Tab by mouth daily. 3/9/17   Claudio Bone MD   montelukast (SINGULAIR) 10 mg tablet Take 1 Tab by mouth nightly. 3/9/17   Claudio Bone MD       No Known Allergies      Review of Systems    Pertinent positive as noted in HPI. All other systems reviewed and are negative.      Physical Exam:     Physical Exam:  Visit Vitals  /87 (BP 1 Location: Left arm, BP Patient Position: At rest)   Pulse 63   Temp 97.8 °F (36.6 °C)   Resp 19   Ht 4' 11\" (1.499 m) Wt 77.1 kg (170 lb)   LMP 04/14/2009   SpO2 100%   BMI 34.34 kg/m²    O2 Flow Rate (L/min): 3 l/min O2 Device: Nasal cannula      General:  Patient is awake,  cooperative, no distress, appears stated age. Head: Normocephalic, atraumatic, without obvious abnormality. Eyes:  Conjunctivae/corneas clear. PERRL, EOMs intact. Nose: Nares normal. No drainage or sinus tenderness. Neck: Supple, symmetrical, trachea midline, no adenopathy, thyroid: no enlargement, no carotid bruit and no JVD. Lungs:    Diminished breath sounds to auscultation bilaterally. No rales. No Wheezing. Heart:  Regular rate and rhythm, S1, S2 normal.     Abdomen:  Dressing in place. As per ED physician and RN wound is clean. No active bleeding or drainage. Extremities: Extremities normal, atraumatic, no cyanosis or edema. Pulses: 2+ and symmetric all extremities. Skin:  No rashes , Ulcer or lesions   Neurologic: AAOx3, No focal motor or sensory deficit. Labs Reviewed and discussed with patient . Procedures/imaging:   See electronic medical records for all procedures/Xrays and details which were not copied into this note but were reviewed prior to creation of Plan      Assessment/Plan          # Partial small bowel obstruction and status post hernia repair on October 20, 2019: N.p.o.  NG tube IV fluid. Surgery Dr. Gayathri Conteh is managing her bowel obstruction. # Essential hypertension: Continue home medications. Cover with IV hydralazine PRN. # COPD without exacerbation: Continue her naps which she is taking at home. DuoNeb. # Anxiety/depression: Continue her home medications. # Chronic kidney disease stage III: IV fluid. Avoid nephrotoxic agents. Monitor BMP. # DVT prophylaxis: Lovenox. Further evaluation and treatment per patient's attending, test results and patient's clinical course. Plan of the care was discussed with patient. She  is in agreement.     Patient is Full Jignesh Lui MD  10/31/2019 11:27 PM

## 2019-11-01 NOTE — ED NOTES
Cleaned patients wound with dermal cleanser spray. Applied non-adhesive pads and an abdominal pad as a dressing. Patients wound appears to be healing. No redness or oozing at the site.

## 2019-11-01 NOTE — PROGRESS NOTES
[facility] Readmission Patient Interview    The following is related to the patient's last admission and the events following discharge from the hospital:      Date of last hospital discharge:  10/17/19    Date of Readmission:  10/31/19    Reason for Readmission:  Small bowel obstruction, Bilious vomiting, abdominal pain, s/p hernia repair    Were you kept informed about your diagnoses during your stay in the hospital and what was being done to further evaluate and treat them? * in reference to index admission*      Interviewer Notes:       [] None of time   [x] Some of time   [] Most of time   [] All of time   At the time of your discharge, did someone talk to you about:  *if patient answers yes - ask them to recall what information they remember*     1. What your diagnoses were? 2. What tests or procedures needed to be done after you left? 3. What to watch out for regarding worsening of your disease? 4. What to do if you were experiencing worsening of your disease? 5. Who to contact (and how) if you were experiencing worsening of your disease? Interviewer Notes:           [x] Yes  [] No  [] Not Sure   [x] Yes  [] No  [] Not Sure   [x] Yes  [] No  [] Not Sure   [x] Yes  [] No  [] Not Sure   [x] Yes  [] No  [] Not Sure   Were you asked about your understanding of these instructions? Interviewer Notes:    [x] Yes  [] No  [] Not Sure   Were the discharge instructions written down and given to you before you left? Interviewer Notes:    [x] Yes  [] No  [] Not Sure   Were the written discharge instructions and plans easy to read and understand? Interviewer Notes:    [x] Yes  [] No  [] Not Sure   How confident were you about understanding these instructions? Interviewer Notes:  [x] Very confident   [] Somewhat confident   [] Not confident   [] Not Sure   Do you have a regular doctor who takes care of you for most things?    Primary Providers Name/Practice Name:Dr. Tiffany Cullen  [] Yes [] No  [] Not Sure   At the time of your discharge, did someone talk to you about which medications to take when you left and which ones to discontinue? Interviewer Notes:    [x] Yes  [] No  [] Not Sure   Did you take your medications as they were prescribed? Interviewer Notes:    [x] Yes  [] No  [] Not Sure   If not, what were the difficulties you experienced with taking your medications? Comments:          After you left the hospital, did you have an appointment with your doctor? [x] Yes  [] No  [] Not Sure     If yes, who made the appointment? [] I did    [] My family   [x] Hospital staff   [] Not Sure   Were you able to get to this appointment? Interviewer Notes:    [x] Yes  [] No  [] Not Sure   How do you think you became sick enough to be readmitted to the hospital?   Comment: Patient stated \" I was hurting and started throwing up. \"        Source:  Modified from [facility], [place], [place]    CHARITO LópezN, RN  Pager # [phone]  Care Manager

## 2019-11-01 NOTE — ROUTINE PROCESS
Bedside and Verbal shift change report given to [facility] (oncoming nurse) by Ion Jones (offgoing nurse). Report included the following information SBAR, Kardex, Intake/Output, MAR and Recent Results.

## 2019-11-01 NOTE — H&P
[facility]  General Surgery    Subjective:      HPI: Patient is 2 weeks out from exploratory laparotomy with repair of incarcerated incisional hernia. She presents to the ER with complaints of 4 days of nausea and vomiting and abdominal pain. Her white blood cell count is normal.  Her electrolytes and white blood cell count are normal.  I reviewed the CT scan of abdomen and pelvis performed without contrast independently on the monitor. The CT does reveal dilated proximal small bowel. The urinalysis was remarkable for multiple WBCs. The patient denies any symptoms consistent with urinary tract infection.     Patient Active Problem List    Diagnosis Date Noted    Bilious vomiting 10/31/2019    SBO (small bowel obstruction) ([address] 10/31/2019    Abdominal pain 10/31/2019    S/P hernia repair 10/31/2019    Partial small bowel obstruction ([address] 10/31/2019    CKD (chronic kidney disease) stage 3, GFR 30-59 ml/min ([address] 10/31/2019    Incarcerated incisional hernia 10/13/2019    COPD (chronic obstructive pulmonary disease) ([address] 09/28/2019    Hypertensive urgency 09/28/2019    Tobacco use 09/28/2019    Acute bronchitis with chronic obstructive pulmonary disease (COPD) ([address] 03/09/2019    T wave inversion in EKG 03/09/2019    Hypertensive heart failure ([address] 12/19/2017    Sleep apnea 12/19/2017    COPD (chronic obstructive pulmonary disease) with chronic bronchitis ([address] 12/19/2017    Acute asthma exacerbation 04/21/2017    Essential hypertension 03/10/2017    Morbid obesity due to excess calories ([address] 03/10/2017    COPD with acute exacerbation ([address] 03/02/2017    Acute respiratory failure with hypercapnia (HCC) 02/26/2017    Asthma exacerbation 01/26/2017    Respiratory failure ([address] 01/11/2017    Acute encephalopathy 01/11/2017    Acute exacerbation of chronic obstructive pulmonary disease (COPD) ([address] 08/29/2016    Acute on chronic respiratory failure with hypoxemia ([address] 03/12/2016    Asthma 03/05/2016    Abnormal CT of the chest 11/20/2015    Asthma exacerbation attacks 10/19/2015    Status asthmaticus with COPD (chronic obstructive pulmonary disease) ([facility] 10/19/2015     Past Medical History:   Diagnosis Date    Asthma     CHF (congestive heart failure) (HCC)     Chronic obstructive pulmonary disease (HCC)     Dependence on supplemental oxygen     Endocrine disease     thyroid issues    Gastrointestinal disorder     \"blockage in my stomach\"    Hypercholesterolemia     Hypertension       Past Surgical History:   Procedure Laterality Date    HX GI      Exploratory laparotomy with lysis of adhesions and primary repair of incarcerated umbilical hernia      No family history on file. Social History     Tobacco Use    Smoking status: Current Every Day Smoker     Packs/day: 0.50    Smokeless tobacco: Former User   Substance Use Topics    Alcohol use: No     Comment: socially      No Known Allergies    Prior to Admission medications    Medication Sig Start Date End Date Taking? Authorizing Provider   oxyCODONE-acetaminophen (PERCOCET) 5-325 mg per tablet Take 1 Tab by mouth every six (6) hours as needed for Pain for up to 7 days. Max Daily Amount: 4 Tabs. Indications: pain 10/23/19 10/30/19  Edna Iraheta MD   ibuprofen (MOTRIN) 800 mg tablet Take 1 Tab by mouth three (3) times daily as needed for Pain. 10/17/19   Edna Iraheta MD   docusate sodium (COLACE) 100 mg capsule Take 1 Cap by mouth two (2) times a day. 10/17/19   Edna Iraheta MD   ondansetron (ZOFRAN ODT) 4 mg disintegrating tablet Take 1 Tab by mouth every eight (8) hours as needed for Nausea. Indications: prevent nausea and vomiting after surgery 10/17/19   Edna Iraheta MD   hydroCHLOROthiazide (HYDRODIURIL) 50 mg tablet Take 25 mg by mouth daily. Provider, Historical   QUEtiapine (SEROQUEL) 300 mg tablet Take 300 mg by mouth nightly.  Indications: States she lost her daughter Provider, Historical   albuterol sulfate 90 mcg/actuation aepb Take 2 Puffs by inhalation every four (4) hours as needed for Cough or Other (Wheezing). 7/25/19   Beverly Yates MD   fluticasone furoate-vilanterol (BREO ELLIPTA) 200-25 mcg/dose inhaler Take 1 Puff by inhalation daily. Abilio, MD Shalini   lisinopril (PRINIVIL, ZESTRIL) 20 mg tablet Take 20 mg by mouth daily. Shalini Knox MD   amLODIPine (NORVASC) 10 mg tablet Take 1 Tab by mouth daily. 7/5/19   Lillian Grace MD   butalbital-acetaminophen-caff (FIORICET) -40 mg per capsule Take 1 Cap by mouth every four (4) hours as needed for Pain. 6/30/19   Rolly Ruiz PA-C   arformoterol (BROVANA) 15 mcg/2 mL nebu neb solution 2 mL by Nebulization route two (2) times a day. 3/14/19   Andrew Phan MD   budesonide (PULMICORT) 0.5 mg/2 mL nbsp 2 mL by Nebulization route two (2) times a day. 3/14/19   Andrew Phan MD   tiotropium (SPIRIVA) 18 mcg inhalation capsule Take 1 Cap by inhalation daily. 3/14/19   Andrew Phan MD   atorvastatin (LIPITOR) 20 mg tablet Take 1 Tab by mouth nightly. 3/14/19   Andrew Phan MD   cloNIDine HCl (CATAPRES) 0.2 mg tablet Take 1 Tab by mouth two (2) times a day. 3/14/19   Andrew Phan MD   escitalopram oxalate (LEXAPRO) 10 mg tablet Take 1 Tab by mouth every evening. 3/14/19   Andrew Phan MD   metoprolol tartrate (LOPRESSOR) 25 mg tablet Take 0.5 Tabs by mouth every twelve (12) hours. 3/14/19   Andrew Phan MD   roflumilast (DALIRESP) 500 mcg tab tablet Take 1 Tab by mouth daily. 3/15/19   Andrew Phan MD   OTHER Incentive spirometry- use as directed 3/14/19   Andrew Phan MD   naloxone [facility]) 4 mg/actuation nasal spray Use 1 spray intranasally, then discard. Repeat with new spray every 2 min as needed for opioid overdose symptoms, alternating nostrils.  3/14/19   Andrew Phan MD   budesonide-formoterol (SYMBICORT) 160-4.5 mcg/actuation HFAA Take 2 Puffs by inhalation two (2) times a day. 8/23/18   Marino HolsteinDO   ALPRAZolam Lurlean Leo) 1 mg tablet Take 1 Tab by mouth two (2) times a day. Max Daily Amount: 2 mg. 4/24/17   Lamar Zhang MD   famotidine (PEPCID) 20 mg tablet Take 1 Tab by mouth daily. 3/9/17   Yu Esquivel MD   montelukast (SINGULAIR) 10 mg tablet Take 1 Tab by mouth nightly. 3/9/17   Yu Esquivel MD       Review of Systems:    14 systems were reviewed. The results are as above in the HPI and otherwise negative. Objective:     Vitals:    10/31/19 1615 10/31/19 1955   BP: (!) 151/95 179/87   Pulse: 71 63   Resp: 18 19   Temp: 98.3 °F (36.8 °C) 97.8 °F (36.6 °C)   SpO2: 96% 100%   Weight: 77.1 kg (170 lb)    Height: 4' 11\" (1.499 m)        Physical Exam:  GENERAL: alert, cooperative, no distress, appears stated age,   EYE: conjunctivae/corneas clear. PERRL, EOM's intact. THROAT & NECK: normal and no erythema or exudates noted. ,    LYMPHATIC: Cervical, supraclavicular, and axillary nodes normal. ,   LUNG: clear to auscultation bilaterally,   HEART: regular rate and rhythm, S1, S2 normal, no murmur, click, rub or gallop,   ABDOMEN: soft, obese, nondistended, generalized tenderness positive bowel sounds. Incision with one staple left at the most caudal portion. Small amount of serous drainage from the lower portion of the incision. No masses,  no organomegaly,   EXTREMITIES:  extremities normal, atraumatic, no cyanosis or edema,   SKIN: Normal.,   NEUROLOGIC: AOx3. Cranial nerves 2-12 and sensation grossly intact. ,     Data Review:    Recent Results (from the past 24 hour(s))   CBC WITH AUTOMATED DIFF    Collection Time: 10/31/19  4:50 PM   Result Value Ref Range    WBC 6.2 4.6 - 13.2 K/uL    RBC 4.34 4.20 - 5.30 M/uL    HGB 12.7 12.0 - 16.0 g/dL    HCT 38.2 35.0 - 45.0 %    MCV 88.0 74.0 - 97.0 FL    MCH 29.3 24.0 - 34.0 PG    MCHC 33.2 31.0 - 37.0 g/dL    RDW 12.4 11.6 - 14.5 %    PLATELET 697 023 - 897 K/uL    MPV 10.9 9.2 - 11.8 FL    NEUTROPHILS 60 40 - 73 %    LYMPHOCYTES 30 21 - 52 %    MONOCYTES 8 3 - 10 %    EOSINOPHILS 2 0 - 5 %    BASOPHILS 0 0 - 2 %    ABS. NEUTROPHILS 3.7 1.8 - 8.0 K/UL    ABS. LYMPHOCYTES 1.8 0.9 - 3.6 K/UL    ABS. MONOCYTES 0.5 0.05 - 1.2 K/UL    ABS. EOSINOPHILS 0.1 0.0 - 0.4 K/UL    ABS. BASOPHILS 0.0 0.0 - 0.1 K/UL    DF AUTOMATED     METABOLIC PANEL, COMPREHENSIVE    Collection Time: 10/31/19  4:50 PM   Result Value Ref Range    Sodium 138 136 - 145 mmol/L    Potassium 3.7 3.5 - 5.5 mmol/L    Chloride 102 100 - 111 mmol/L    CO2 30 21 - 32 mmol/L    Anion gap 6 3.0 - 18 mmol/L    Glucose 98 74 - 99 mg/dL    BUN 22 (H) 7.0 - 18 MG/DL    Creatinine 1.48 (H) 0.6 - 1.3 MG/DL    BUN/Creatinine ratio 15 12 - 20      GFR est AA 44 (L) >60 ml/min/1.73m2    GFR est non-AA 37 (L) >60 ml/min/1.73m2    Calcium 9.2 8.5 - 10.1 MG/DL    Bilirubin, total 0.3 0.2 - 1.0 MG/DL    ALT (SGPT) 17 13 - 56 U/L    AST (SGOT) 21 10 - 38 U/L    Alk.  phosphatase 103 45 - 117 U/L    Protein, total 7.3 6.4 - 8.2 g/dL    Albumin 3.8 3.4 - 5.0 g/dL    Globulin 3.5 2.0 - 4.0 g/dL    A-G Ratio 1.1 0.8 - 1.7     LIPASE    Collection Time: 10/31/19  4:50 PM   Result Value Ref Range    Lipase 71 (L) 73 - 393 U/L   POC LACTIC ACID    Collection Time: 10/31/19  8:41 PM   Result Value Ref Range    Lactic Acid (POC) 0.51 0.40 - 2.00 mmol/L   URINALYSIS W/ RFLX MICROSCOPIC    Collection Time: 10/31/19 10:52 PM   Result Value Ref Range    Color YELLOW      Appearance CLOUDY      Specific gravity >1.030 (H) 1.005 - 1.030    pH (UA) 5.5 5.0 - 8.0      Protein 30 (A) NEG mg/dL    Glucose NEGATIVE  NEG mg/dL    Ketone TRACE (A) NEG mg/dL    Bilirubin NEGATIVE  NEG      Blood NEGATIVE  NEG      Urobilinogen 1.0 0.2 - 1.0 EU/dL    Nitrites NEGATIVE  NEG      Leukocyte Esterase SMALL (A) NEG     URINE MICROSCOPIC ONLY    Collection Time: 10/31/19 10:52 PM   Result Value Ref Range    WBC 11 to 20 0 - 5 /hpf    RBC NONE 0 - 5 /hpf    Epithelial cells 4+ 0 - 5 /lpf    Bacteria 2+ (A) NEG /hpf    Mucus 1+ (A) NEG /lpf    Hyaline cast 0 to 3 0 - 2 /lpf           Impression:     · Patient with ileus secondary to urinary tract infection cannot rule out partial small bowel obstruction at present. Plan:     · Clamp NG tube possibly remove if no nausea vomiting 4 hours.   · Toradol  · No narcotic medications  · Out of bed to chair, ambulate  · Incentive spirometry  · Treat urinary tract infection  · Replace electrolytes    Signed By: Aba Escalante MD     October 31, 2019

## 2019-11-01 NOTE — ED PROVIDER NOTES
EMERGENCY DEPARTMENT HISTORY AND PHYSICAL EXAM    10:52 PM      Date: 10/31/2019  Patient Name: Zoe Albrecht    History of Presenting Illness     Chief Complaint   Patient presents with    Post OP Complication     abdominal pain         History Provided By: Patient    Additional History (Context): Zoe Albrecht is a 53 y.o. female with Past medical history of hypertension, hypercholesterolemia, COPD who presents with chief complaint of abdominal pain for about 1 week. Associated symptom is bilious vomiting also for almost a week. Patient states that she had hernia repair about 2 weeks ago by Dr. She Daly. She denies any fever, chills, diarrhea, and no other complaint. She does report running out of her pain medication. PCP: Luz Marina Shearer DO        Past History     Past Medical History:  Past Medical History:   Diagnosis Date    Asthma     CHF (congestive heart failure) (HCC)     Chronic obstructive pulmonary disease (HCC)     Dependence on supplemental oxygen     Endocrine disease     thyroid issues    Gastrointestinal disorder     \"blockage in my stomach\"    Hypercholesterolemia     Hypertension        Past Surgical History:  Past Surgical History:   Procedure Laterality Date    HX GI      Exploratory laparotomy with lysis of adhesions and primary repair of incarcerated umbilical hernia       Family History:  History reviewed. No pertinent family history. Social History:  Social History     Tobacco Use    Smoking status: Current Every Day Smoker     Packs/day: 0.50    Smokeless tobacco: Former User   Substance Use Topics    Alcohol use: No     Comment: socially    Drug use: Not Currently     Types: Heroin       Allergies:  No Known Allergies      Review of Systems       Review of Systems   Constitutional: Negative for chills and fever. HENT: Negative for congestion, rhinorrhea, sore throat and trouble swallowing. Eyes: Negative for visual disturbance.    Respiratory: Positive for wheezing. Negative for cough and shortness of breath. Cardiovascular: Negative for chest pain. Gastrointestinal: Positive for abdominal pain, nausea and vomiting. Endocrine: Negative for polyuria. Genitourinary: Negative for dysuria. Musculoskeletal: Negative for arthralgias and neck stiffness. Skin: Negative for pallor and rash. Neurological: Negative for dizziness, weakness, numbness and headaches. Hematological: Does not bruise/bleed easily. Psychiatric/Behavioral: Negative for confusion and dysphoric mood. All other systems reviewed and are negative. Physical Exam     Visit Vitals  /83 (BP 1 Location: Left arm, BP Patient Position: At rest)   Pulse 69   Temp 98 °F (36.7 °C)   Resp 17   Ht 4' 11\" (1.499 m)   Wt 77.1 kg (170 lb)   LMP 04/14/2009   SpO2 92%   Breastfeeding? No   BMI 34.34 kg/m²         Physical Exam   Constitutional: She is oriented to person, place, and time. She appears well-developed and well-nourished. She appears distressed. Appears in pain   HENT:   Head: Normocephalic and atraumatic. Mouth/Throat: Oropharynx is clear and moist.   Eyes: Pupils are equal, round, and reactive to light. Conjunctivae are normal. No scleral icterus. Neck: Normal range of motion. Neck supple. Cardiovascular: Normal rate and intact distal pulses. Capillary refill < 3 seconds   Pulmonary/Chest: Effort normal and breath sounds normal. No respiratory distress. She has no wheezes. Somewhat tight lung sounds, but equal, no rales or wheezing   Abdominal: Soft. Bowel sounds are normal. She exhibits no distension and no mass. There is tenderness. There is guarding. Post surgical incision looks good, no dehiscence, no erythema or draining   Musculoskeletal: Normal range of motion. She exhibits no edema. Lymphadenopathy:     She has no cervical adenopathy. Neurological: She is alert and oriented to person, place, and time. No cranial nerve deficit.  She exhibits normal muscle tone. Coordination normal.   Skin: Skin is warm and dry. She is not diaphoretic. Psychiatric: Her behavior is normal. Thought content normal.   Nursing note and vitals reviewed. Diagnostic Study Results     Labs -  Recent Results (from the past 12 hour(s))   URINALYSIS W/ RFLX MICROSCOPIC    Collection Time: 10/31/19 10:52 PM   Result Value Ref Range    Color YELLOW      Appearance CLOUDY      Specific gravity >1.030 (H) 1.005 - 1.030    pH (UA) 5.5 5.0 - 8.0      Protein 30 (A) NEG mg/dL    Glucose NEGATIVE  NEG mg/dL    Ketone TRACE (A) NEG mg/dL    Bilirubin NEGATIVE  NEG      Blood NEGATIVE  NEG      Urobilinogen 1.0 0.2 - 1.0 EU/dL    Nitrites NEGATIVE  NEG      Leukocyte Esterase SMALL (A) NEG     URINE MICROSCOPIC ONLY    Collection Time: 10/31/19 10:52 PM   Result Value Ref Range    WBC 11 to 20 0 - 5 /hpf    RBC NONE 0 - 5 /hpf    Epithelial cells 4+ 0 - 5 /lpf    Bacteria 2+ (A) NEG /hpf    Mucus 1+ (A) NEG /lpf    Hyaline cast 0 to 3 0 - 2 /lpf   LACTIC ACID    Collection Time: 11/01/19  5:03 AM   Result Value Ref Range    Lactic acid 0.5 0.4 - 2.0 MMOL/L   MAGNESIUM    Collection Time: 11/01/19  5:03 AM   Result Value Ref Range    Magnesium 2.0 1.6 - 2.6 mg/dL   PHOSPHORUS    Collection Time: 11/01/19  5:03 AM   Result Value Ref Range    Phosphorus 3.2 2.5 - 4.9 MG/DL   LIPASE    Collection Time: 11/01/19  5:03 AM   Result Value Ref Range    Lipase 68 (L) 73 - 393 U/L   CBC WITH AUTOMATED DIFF    Collection Time: 11/01/19  5:03 AM   Result Value Ref Range    WBC 4.6 4.6 - 13.2 K/uL    RBC 4.04 (L) 4.20 - 5.30 M/uL    HGB 11.6 (L) 12.0 - 16.0 g/dL    HCT 35.6 35.0 - 45.0 %    MCV 88.1 74.0 - 97.0 FL    MCH 28.7 24.0 - 34.0 PG    MCHC 32.6 31.0 - 37.0 g/dL    RDW 12.4 11.6 - 14.5 %    PLATELET 441 127 - 388 K/uL    MPV 10.9 9.2 - 11.8 FL    NEUTROPHILS 57 40 - 73 %    LYMPHOCYTES 32 21 - 52 %    MONOCYTES 9 3 - 10 %    EOSINOPHILS 2 0 - 5 %    BASOPHILS 0 0 - 2 %    ABS.  NEUTROPHILS 2.6 1.8 - 8.0 K/UL    ABS. LYMPHOCYTES 1.5 0.9 - 3.6 K/UL    ABS. MONOCYTES 0.4 0.05 - 1.2 K/UL    ABS. EOSINOPHILS 0.1 0.0 - 0.4 K/UL    ABS. BASOPHILS 0.0 0.0 - 0.1 K/UL    DF AUTOMATED     METABOLIC PANEL, COMPREHENSIVE    Collection Time: 11/01/19  5:03 AM   Result Value Ref Range    Sodium 140 136 - 145 mmol/L    Potassium 3.1 (L) 3.5 - 5.5 mmol/L    Chloride 105 100 - 111 mmol/L    CO2 30 21 - 32 mmol/L    Anion gap 5 3.0 - 18 mmol/L    Glucose 84 74 - 99 mg/dL    BUN 17 7.0 - 18 MG/DL    Creatinine 1.25 0.6 - 1.3 MG/DL    BUN/Creatinine ratio 14 12 - 20      GFR est AA 54 (L) >60 ml/min/1.73m2    GFR est non-AA 44 (L) >60 ml/min/1.73m2    Calcium 8.1 (L) 8.5 - 10.1 MG/DL    Bilirubin, total 0.3 0.2 - 1.0 MG/DL    ALT (SGPT) 11 (L) 13 - 56 U/L    AST (SGOT) 11 10 - 38 U/L    Alk. phosphatase 86 45 - 117 U/L    Protein, total 6.3 (L) 6.4 - 8.2 g/dL    Albumin 2.9 (L) 3.4 - 5.0 g/dL    Globulin 3.4 2.0 - 4.0 g/dL    A-G Ratio 0.9 0.8 - 1.7         Radiologic Studies -   XR CHEST PORT   Final Result   IMPRESSION:       Feeding tube placement. CT ABD PELV W CONT   Final Result   IMPRESSION:      Proximal small bowel obstruction with distention and fluid extending to the   duodenum and stomach. Suggest nasogastric tube decompression.   -Caliber change seen in the proximal small bowel could be due to an adhesion.   -Question possible adjacent fluid collection as cause of obstruction. See below. Postsurgical changes in the abdomen suggestive of prior partial small bowel   resection. There is a focal fluid-containing structure adjacent to this not   clearly connected to the dilated small bowel loops. Uncertain if this is a   postsurgical patulous area of small bowel versus possible abscess or   postoperative seroma. Correlation with surgical history recommended.  Follow-up   CT with oral contrast administration after bowel decompression may help better   delineate bowel anatomy, as warranted clinically      XR ABD (KUB)   Final Result   IMPRESSION:      A single surgical staple remains at the lower pelvis. Correlate clinically. Nonobstructive bowel gas pattern      XR ABD ACUTE W 1 V CHEST    (Results Pending)         Medical Decision Making   I am the first provider for this patient. I reviewed the vital signs, available nursing notes, past medical history, past surgical history, family history and social history. Vital Signs-Reviewed the patient's vital signs.         Records Reviewed: Nursing Notes and Old Medical Records (Time of Review: 10:52 PM)    Provider Notes (Medical Decision Making): DDX: Postop complication, bowel obstruction, biliary, pancreatitis, infectious    Labs, analgesia, Zofran, IV fluid, CT abdomen pelvis  MDM    Medications   0.9% sodium chloride infusion (150 mL/hr IntraVENous New Bag 11/1/19 0202)   sodium chloride (NS) flush 5-40 mL (has no administration in time range)   sodium chloride (NS) flush 5-40 mL (has no administration in time range)   acetaminophen (TYLENOL) solution 650 mg (has no administration in time range)   naloxone (NARCAN) injection 0.4 mg (has no administration in time range)   dicyclomine (BENTYL) capsule 10 mg (has no administration in time range)   diphenhydrAMINE (BENADRYL) injection 12.5 mg (has no administration in time range)   LORazepam (ATIVAN) injection 1 mg (1 mg IntraVENous Given 11/1/19 0009)   enoxaparin (LOVENOX) injection 40 mg (has no administration in time range)   hydrALAZINE (APRESOLINE) 20 mg/mL injection 10 mg (has no administration in time range)   albuterol-ipratropium (DUO-NEB) 2.5 MG-0.5 MG/3 ML (3 mL Nebulization Given 11/1/19 0819)   promethazine (PHENERGAN) 12.5 mg in 0.9% sodium chloride 50 mL IVPB (has no administration in time range)   benzocaine (HURRICANE) 20 % spray (has no administration in time range)   fentaNYL (DURAGESIC) 75 mcg/hr patch 1 Patch (1 Patch TransDERmal Apply Patch 11/1/19 0700)   metoclopramide HCl (REGLAN) injection 5 mg (5 mg IntraVENous Given 11/1/19 0701)   ketorolac (TORADOL) injection 15 mg (15 mg IntraVENous Given 11/1/19 0845)   ciprofloxacin (CIPRO) 400 mg in D5W IVPB (premix) (400 mg IntraVENous New Bag 11/1/19 0954)   ondansetron (ZOFRAN) injection 4 mg (4 mg IntraVENous Given 10/31/19 2025)   morphine 10 mg/ml injection 8 mg (8 mg IntraVENous Given 10/31/19 2026)   sodium chloride 0.9 % bolus infusion 1,000 mL (0 mL IntraVENous IV Completed 10/31/19 2353)   iopamidol (ISOVUE 300) 61 % contrast injection  mL (70 mL IntraVENous Given 10/31/19 2136)   piperacillin-tazobactam (ZOSYN) 3.375 g in 0.9% sodium chloride (MBP/ADV) 100 mL MBP (0 g IntraVENous IV Completed 10/31/19 2340)   HYDROmorphone (DILAUDID) injection 1 mg (1 mg IntraVENous Given 11/1/19 0006)   fluconazole (DIFLUCAN) 200mg/100 mL IVPB (premix) (200 mg IntraVENous New Bag 11/1/19 0843)         ED Course: Progress Notes, Reevaluation, and Consults:  WBC wnl  LA wnl      +bowel obstruction  ?abd abscess vs seroma    Gave zosyn  NGT placement    Consult:  Discussed care with Dr. Génesis Palacios, Specialty: General surgeon, standard discussion; including history of patients chief complaint, available diagnostic results, and treatment course. He accepts admission to his service, requests consult for the hospitalist.  10:50 PM, 10/31/2019     Consult:  Discussed care with Dr. Howard Anthony, Specialty: Hospitalist, standard discussion; including history of patients chief complaint, available diagnostic results, and treatment course. He accepts consult  10:58 PM, 10/31/2019     Pt reports pain returning. Gave more analgesia    Discussed dx's, results and plan and she agrees. Diagnosis     Clinical Impression:   1. SBO (small bowel obstruction) ([facility] [address]    2. Bilious vomiting with nausea    3. Abdominal pain, generalized    4.  S/P hernia repair        Disposition: admitted    Follow-up Information    None          Current Discharge Medication List CONTINUE these medications which have NOT CHANGED    Details   amLODIPine (NORVASC) 10 mg tablet Take 1 Tab by mouth daily. Qty: 30 Tab, Refills: 0      ALPRAZolam (XANAX) 1 mg tablet Take 1 Tab by mouth two (2) times a day. Max Daily Amount: 2 mg. Qty: 20 Tab, Refills: 0      ibuprofen (MOTRIN) 800 mg tablet Take 1 Tab by mouth three (3) times daily as needed for Pain. Qty: 40 Tab, Refills: 0      docusate sodium (COLACE) 100 mg capsule Take 1 Cap by mouth two (2) times a day. Qty: 120 Cap, Refills: 0      ondansetron (ZOFRAN ODT) 4 mg disintegrating tablet Take 1 Tab by mouth every eight (8) hours as needed for Nausea. Indications: prevent nausea and vomiting after surgery  Qty: 20 Tab, Refills: 0      hydroCHLOROthiazide (HYDRODIURIL) 50 mg tablet Take 25 mg by mouth daily. QUEtiapine (SEROQUEL) 300 mg tablet Take 300 mg by mouth nightly. Indications: States she lost her daughter      albuterol sulfate 90 mcg/actuation aepb Take 2 Puffs by inhalation every four (4) hours as needed for Cough or Other (Wheezing). Qty: 1 Inhaler, Refills: 0      fluticasone furoate-vilanterol (BREO ELLIPTA) 200-25 mcg/dose inhaler Take 1 Puff by inhalation daily. lisinopril (PRINIVIL, ZESTRIL) 20 mg tablet Take 20 mg by mouth daily. butalbital-acetaminophen-caff (FIORICET) -40 mg per capsule Take 1 Cap by mouth every four (4) hours as needed for Pain. Qty: 10 Cap, Refills: 0      arformoterol (BROVANA) 15 mcg/2 mL nebu neb solution 2 mL by Nebulization route two (2) times a day. Qty: 60 Vial, Refills: 0      budesonide (PULMICORT) 0.5 mg/2 mL nbsp 2 mL by Nebulization route two (2) times a day. Qty: 60 Each, Refills: 0      tiotropium (SPIRIVA) 18 mcg inhalation capsule Take 1 Cap by inhalation daily. Qty: 30 Cap, Refills: 0      atorvastatin (LIPITOR) 20 mg tablet Take 1 Tab by mouth nightly.   Qty: 30 Tab, Refills: 0      cloNIDine HCl (CATAPRES) 0.2 mg tablet Take 1 Tab by mouth two (2) times a day.  Qty: 60 Tab, Refills: 0      escitalopram oxalate (LEXAPRO) 10 mg tablet Take 1 Tab by mouth every evening. Qty: 30 Tab, Refills: 0      metoprolol tartrate (LOPRESSOR) 25 mg tablet Take 0.5 Tabs by mouth every twelve (12) hours. Qty: 30 Tab, Refills: 0      roflumilast (DALIRESP) 500 mcg tab tablet Take 1 Tab by mouth daily. Qty: 30 Tab, Refills: 0      OTHER Incentive spirometry- use as directed  Qty: 1 Each, Refills: 0      naloxone (NARCAN) 4 mg/actuation nasal spray Use 1 spray intranasally, then discard. Repeat with new spray every 2 min as needed for opioid overdose symptoms, alternating nostrils. Qty: 1 Each, Refills: 0      budesonide-formoterol (SYMBICORT) 160-4.5 mcg/actuation HFAA Take 2 Puffs by inhalation two (2) times a day. Qty: 1 Inhaler, Refills: 1      famotidine (PEPCID) 20 mg tablet Take 1 Tab by mouth daily. Qty: 30 Tab, Refills: 1      montelukast (SINGULAIR) 10 mg tablet Take 1 Tab by mouth nightly. Qty: 30 Tab, Refills: 1         STOP taking these medications       oxyCODONE-acetaminophen (PERCOCET) 5-325 mg per tablet Comments:   Reason for Stopping:                 Thalia Abraham DO    Dragon medical dictation software was used for portions of this report. Unintended transcription errors may occur. My signature above authenticates this document and my orders, the final    diagnosis (es), discharge prescription (s), and instructions in the Epic    record.

## 2019-11-01 NOTE — ROUTINE PROCESS
0200 Patient received from ER via bed AAOx4 , NGT in place draining greenish gastric fluids, patient denies N/V. No c/o pain at this time. Will continues to monitor.

## 2019-11-02 ENCOUNTER — HOME CARE VISIT (OUTPATIENT)
Dept: HOME HEALTH SERVICES | Facility: HOME HEALTH | Age: 54
End: 2019-11-02
Payer: MEDICAID

## 2019-11-02 LAB
ALBUMIN SERPL-MCNC: 2.7 G/DL (ref 3.4–5)
ALBUMIN/GLOB SERPL: 0.8 {RATIO} (ref 0.8–1.7)
ALP SERPL-CCNC: 84 U/L (ref 45–117)
ALT SERPL-CCNC: 11 U/L (ref 13–56)
ANION GAP SERPL CALC-SCNC: 4 MMOL/L (ref 3–18)
AST SERPL-CCNC: 11 U/L (ref 10–38)
BASOPHILS # BLD: 0 K/UL (ref 0–0.1)
BASOPHILS NFR BLD: 0 % (ref 0–2)
BILIRUB DIRECT SERPL-MCNC: <0.1 MG/DL (ref 0–0.2)
BILIRUB SERPL-MCNC: 0.2 MG/DL (ref 0.2–1)
BUN SERPL-MCNC: 16 MG/DL (ref 7–18)
BUN/CREAT SERPL: 14 (ref 12–20)
CALCIUM SERPL-MCNC: 7.9 MG/DL (ref 8.5–10.1)
CHLORIDE SERPL-SCNC: 112 MMOL/L (ref 100–111)
CO2 SERPL-SCNC: 28 MMOL/L (ref 21–32)
CREAT SERPL-MCNC: 1.11 MG/DL (ref 0.6–1.3)
DIFFERENTIAL METHOD BLD: ABNORMAL
EOSINOPHIL # BLD: 0.1 K/UL (ref 0–0.4)
EOSINOPHIL NFR BLD: 2 % (ref 0–5)
ERYTHROCYTE [DISTWIDTH] IN BLOOD BY AUTOMATED COUNT: 12.3 % (ref 11.6–14.5)
GLOBULIN SER CALC-MCNC: 3.2 G/DL (ref 2–4)
GLUCOSE SERPL-MCNC: 107 MG/DL (ref 74–99)
HCT VFR BLD AUTO: 34.1 % (ref 35–45)
HGB BLD-MCNC: 11.2 G/DL (ref 12–16)
LYMPHOCYTES # BLD: 1.8 K/UL (ref 0.9–3.6)
LYMPHOCYTES NFR BLD: 33 % (ref 21–52)
MCH RBC QN AUTO: 28.9 PG (ref 24–34)
MCHC RBC AUTO-ENTMCNC: 32.8 G/DL (ref 31–37)
MCV RBC AUTO: 87.9 FL (ref 74–97)
MONOCYTES # BLD: 0.4 K/UL (ref 0.05–1.2)
MONOCYTES NFR BLD: 8 % (ref 3–10)
NEUTS SEG # BLD: 3.1 K/UL (ref 1.8–8)
NEUTS SEG NFR BLD: 57 % (ref 40–73)
PLATELET # BLD AUTO: 285 K/UL (ref 135–420)
PMV BLD AUTO: 10.8 FL (ref 9.2–11.8)
POTASSIUM SERPL-SCNC: 3.4 MMOL/L (ref 3.5–5.5)
PROT SERPL-MCNC: 5.9 G/DL (ref 6.4–8.2)
RBC # BLD AUTO: 3.88 M/UL (ref 4.2–5.3)
SODIUM SERPL-SCNC: 144 MMOL/L (ref 136–145)
WBC # BLD AUTO: 5.4 K/UL (ref 4.6–13.2)

## 2019-11-02 PROCEDURE — 36415 COLL VENOUS BLD VENIPUNCTURE: CPT

## 2019-11-02 PROCEDURE — 74011250636 HC RX REV CODE- 250/636: Performed by: EMERGENCY MEDICINE

## 2019-11-02 PROCEDURE — 74011000250 HC RX REV CODE- 250: Performed by: INTERNAL MEDICINE

## 2019-11-02 PROCEDURE — 74011250636 HC RX REV CODE- 250/636: Performed by: SURGERY

## 2019-11-02 PROCEDURE — 80048 BASIC METABOLIC PNL TOTAL CA: CPT

## 2019-11-02 PROCEDURE — 65270000029 HC RM PRIVATE

## 2019-11-02 PROCEDURE — 94640 AIRWAY INHALATION TREATMENT: CPT

## 2019-11-02 PROCEDURE — 74011000250 HC RX REV CODE- 250: Performed by: HOSPITALIST

## 2019-11-02 PROCEDURE — 74011250637 HC RX REV CODE- 250/637: Performed by: INTERNAL MEDICINE

## 2019-11-02 PROCEDURE — 74011000250 HC RX REV CODE- 250: Performed by: SURGERY

## 2019-11-02 PROCEDURE — 80076 HEPATIC FUNCTION PANEL: CPT

## 2019-11-02 PROCEDURE — 74011250636 HC RX REV CODE- 250/636: Performed by: INTERNAL MEDICINE

## 2019-11-02 PROCEDURE — 85025 COMPLETE CBC W/AUTO DIFF WBC: CPT

## 2019-11-02 PROCEDURE — 74011000258 HC RX REV CODE- 258: Performed by: INTERNAL MEDICINE

## 2019-11-02 RX ORDER — DEXTROSE, SODIUM CHLORIDE, AND POTASSIUM CHLORIDE 5; .45; .3 G/100ML; G/100ML; G/100ML
INJECTION INTRAVENOUS CONTINUOUS
Status: DISCONTINUED | OUTPATIENT
Start: 2019-11-02 | End: 2019-11-03

## 2019-11-02 RX ORDER — HYDROCHLOROTHIAZIDE 25 MG/1
25 TABLET ORAL DAILY
Status: DISCONTINUED | OUTPATIENT
Start: 2019-11-03 | End: 2019-11-03 | Stop reason: HOSPADM

## 2019-11-02 RX ORDER — ATORVASTATIN CALCIUM 20 MG/1
20 TABLET, FILM COATED ORAL
Status: DISCONTINUED | OUTPATIENT
Start: 2019-11-02 | End: 2019-11-03 | Stop reason: HOSPADM

## 2019-11-02 RX ORDER — FAMOTIDINE 20 MG/1
20 TABLET, FILM COATED ORAL 2 TIMES DAILY
Status: DISCONTINUED | OUTPATIENT
Start: 2019-11-02 | End: 2019-11-03 | Stop reason: HOSPADM

## 2019-11-02 RX ORDER — DOCUSATE SODIUM 100 MG/1
100 CAPSULE, LIQUID FILLED ORAL 2 TIMES DAILY
Status: DISCONTINUED | OUTPATIENT
Start: 2019-11-02 | End: 2019-11-03 | Stop reason: HOSPADM

## 2019-11-02 RX ORDER — MONTELUKAST SODIUM 10 MG/1
10 TABLET ORAL
Status: DISCONTINUED | OUTPATIENT
Start: 2019-11-02 | End: 2019-11-03 | Stop reason: HOSPADM

## 2019-11-02 RX ORDER — AMLODIPINE BESYLATE 10 MG/1
10 TABLET ORAL DAILY
Status: DISCONTINUED | OUTPATIENT
Start: 2019-11-02 | End: 2019-11-03 | Stop reason: HOSPADM

## 2019-11-02 RX ORDER — ARFORMOTEROL TARTRATE 15 UG/2ML
15 SOLUTION RESPIRATORY (INHALATION)
Status: DISCONTINUED | OUTPATIENT
Start: 2019-11-02 | End: 2019-11-03 | Stop reason: HOSPADM

## 2019-11-02 RX ORDER — ARFORMOTEROL TARTRATE 15 UG/2ML
15 SOLUTION RESPIRATORY (INHALATION) 2 TIMES DAILY
Status: DISCONTINUED | OUTPATIENT
Start: 2019-11-02 | End: 2019-11-02

## 2019-11-02 RX ORDER — CLONIDINE HYDROCHLORIDE 0.1 MG/1
0.2 TABLET ORAL 2 TIMES DAILY
Status: DISCONTINUED | OUTPATIENT
Start: 2019-11-02 | End: 2019-11-03 | Stop reason: HOSPADM

## 2019-11-02 RX ORDER — BUDESONIDE 0.5 MG/2ML
500 INHALANT ORAL
Status: DISCONTINUED | OUTPATIENT
Start: 2019-11-02 | End: 2019-11-03 | Stop reason: HOSPADM

## 2019-11-02 RX ORDER — ESCITALOPRAM OXALATE 10 MG/1
10 TABLET ORAL EVERY EVENING
Status: DISCONTINUED | OUTPATIENT
Start: 2019-11-03 | End: 2019-11-03 | Stop reason: HOSPADM

## 2019-11-02 RX ORDER — IPRATROPIUM BROMIDE AND ALBUTEROL SULFATE 2.5; .5 MG/3ML; MG/3ML
3 SOLUTION RESPIRATORY (INHALATION)
Status: DISCONTINUED | OUTPATIENT
Start: 2019-11-02 | End: 2019-11-03 | Stop reason: HOSPADM

## 2019-11-02 RX ORDER — LISINOPRIL 20 MG/1
20 TABLET ORAL DAILY
Status: DISCONTINUED | OUTPATIENT
Start: 2019-11-03 | End: 2019-11-03 | Stop reason: HOSPADM

## 2019-11-02 RX ORDER — METOPROLOL TARTRATE 25 MG/1
12.5 TABLET, FILM COATED ORAL EVERY 12 HOURS
Status: DISCONTINUED | OUTPATIENT
Start: 2019-11-02 | End: 2019-11-03 | Stop reason: HOSPADM

## 2019-11-02 RX ORDER — ESCITALOPRAM OXALATE 10 MG/1
10 TABLET ORAL EVERY EVENING
Status: DISCONTINUED | OUTPATIENT
Start: 2019-11-02 | End: 2019-11-02

## 2019-11-02 RX ADMIN — KETOROLAC TROMETHAMINE 15 MG: 15 INJECTION, SOLUTION INTRAMUSCULAR; INTRAVENOUS at 17:41

## 2019-11-02 RX ADMIN — FAMOTIDINE 20 MG: 10 INJECTION INTRAVENOUS at 10:43

## 2019-11-02 RX ADMIN — ENOXAPARIN SODIUM 40 MG: 40 INJECTION SUBCUTANEOUS at 23:27

## 2019-11-02 RX ADMIN — METOPROLOL TARTRATE 12.5 MG: 25 TABLET ORAL at 20:48

## 2019-11-02 RX ADMIN — SODIUM CHLORIDE 150 ML/HR: 900 INJECTION, SOLUTION INTRAVENOUS at 00:58

## 2019-11-02 RX ADMIN — IPRATROPIUM BROMIDE AND ALBUTEROL SULFATE 3 ML: .5; 3 SOLUTION RESPIRATORY (INHALATION) at 14:20

## 2019-11-02 RX ADMIN — CIPROFLOXACIN 400 MG: 2 INJECTION, SOLUTION INTRAVENOUS at 19:36

## 2019-11-02 RX ADMIN — CLONIDINE HYDROCHLORIDE 0.2 MG: 0.1 TABLET ORAL at 17:41

## 2019-11-02 RX ADMIN — MONTELUKAST 10 MG: 10 TABLET, FILM COATED ORAL at 22:01

## 2019-11-02 RX ADMIN — IPRATROPIUM BROMIDE AND ALBUTEROL SULFATE 3 ML: .5; 3 SOLUTION RESPIRATORY (INHALATION) at 23:24

## 2019-11-02 RX ADMIN — DEXTROSE MONOHYDRATE, SODIUM CHLORIDE, AND POTASSIUM CHLORIDE: 50; 4.5; 2.98 INJECTION, SOLUTION INTRAVENOUS at 17:41

## 2019-11-02 RX ADMIN — DOCUSATE SODIUM 100 MG: 100 CAPSULE, LIQUID FILLED ORAL at 17:41

## 2019-11-02 RX ADMIN — KETOROLAC TROMETHAMINE 15 MG: 15 INJECTION, SOLUTION INTRAMUSCULAR; INTRAVENOUS at 05:04

## 2019-11-02 RX ADMIN — BUDESONIDE 500 MCG: 0.5 INHALANT RESPIRATORY (INHALATION) at 23:24

## 2019-11-02 RX ADMIN — KETOROLAC TROMETHAMINE 15 MG: 15 INJECTION, SOLUTION INTRAMUSCULAR; INTRAVENOUS at 11:38

## 2019-11-02 RX ADMIN — METOCLOPRAMIDE 5 MG: 5 INJECTION, SOLUTION INTRAMUSCULAR; INTRAVENOUS at 05:12

## 2019-11-02 RX ADMIN — ATORVASTATIN CALCIUM 20 MG: 20 TABLET, FILM COATED ORAL at 22:02

## 2019-11-02 RX ADMIN — Medication 10 ML: at 05:12

## 2019-11-02 RX ADMIN — AMLODIPINE BESYLATE 10 MG: 10 TABLET ORAL at 16:21

## 2019-11-02 RX ADMIN — FAMOTIDINE 20 MG: 20 TABLET ORAL at 17:41

## 2019-11-02 RX ADMIN — Medication 10 ML: at 22:02

## 2019-11-02 RX ADMIN — ARFORMOTEROL TARTRATE 15 MCG: 15 SOLUTION RESPIRATORY (INHALATION) at 23:22

## 2019-11-02 RX ADMIN — POTASSIUM CHLORIDE: 2 INJECTION, SOLUTION, CONCENTRATE INTRAVENOUS at 17:40

## 2019-11-02 RX ADMIN — CIPROFLOXACIN 400 MG: 2 INJECTION, SOLUTION INTRAVENOUS at 10:43

## 2019-11-02 RX ADMIN — METOCLOPRAMIDE 5 MG: 5 INJECTION, SOLUTION INTRAMUSCULAR; INTRAVENOUS at 11:38

## 2019-11-02 RX ADMIN — KETOROLAC TROMETHAMINE 15 MG: 15 INJECTION, SOLUTION INTRAMUSCULAR; INTRAVENOUS at 23:28

## 2019-11-02 RX ADMIN — METOCLOPRAMIDE 5 MG: 5 INJECTION, SOLUTION INTRAMUSCULAR; INTRAVENOUS at 23:28

## 2019-11-02 RX ADMIN — IPRATROPIUM BROMIDE AND ALBUTEROL SULFATE 3 ML: .5; 3 SOLUTION RESPIRATORY (INHALATION) at 08:41

## 2019-11-02 RX ADMIN — METOCLOPRAMIDE 5 MG: 5 INJECTION, SOLUTION INTRAMUSCULAR; INTRAVENOUS at 17:41

## 2019-11-02 RX ADMIN — IPRATROPIUM BROMIDE AND ALBUTEROL SULFATE 3 ML: .5; 3 SOLUTION RESPIRATORY (INHALATION) at 04:49

## 2019-11-02 NOTE — PROGRESS NOTES
SUBJECTIVE:    Patient is dressed up and walking in the room without any problem. NG tube has been out since this morning. She ate lunch without any problem. Passing flatus but no bowel movement yet. Abdominal pain is better. No nausea or vomiting. No chest pain shortness of breath or cough. OBJECTIVE:    BP (!) 188/98 (BP 1 Location: Left arm, BP Patient Position: At rest)   Pulse 73   Temp 98.1 °F (36.7 °C)   Resp 18   Ht 4' 11\" (1.499 m)   Wt 83.9 kg (185 lb)   LMP 04/14/2009   SpO2 96%   Breastfeeding? No   BMI 37.37 kg/m²     General appearance - alert, well appearing, and in no distress  Chest -normal air entry noted in bases, no wheezes  Heart - S1 and S2 normal  Abdomen - soft, nondistended,  bowel sounds present  Neurological - alert, oriented, normal speech, no focal findings noted   Extremities - no pedal edema noted    ASSESSMENT:    1. Partial small bowel obstruction and status post hernia repair on October 28, 2019, clinically better  2. Hypertension  3. COPD without exacerbation  4. Anxiety and depression  5. Hypokalemia  6. Stage III chronic kidney disease  7.   GERD    PLAN:    Continue current management  Surgical opinion noted  We will replace potassium  We will restart her home medications for hypertension, anxiety, depression and GERD as well as COPD  The patient remained stable overnight, patient can be discharged home if it is okay with surgeon    BMP:   Lab Results   Component Value Date/Time     11/02/2019 02:06 AM    K 3.4 (L) 11/02/2019 02:06 AM     (H) 11/02/2019 02:06 AM    CO2 28 11/02/2019 02:06 AM    AGAP 4 11/02/2019 02:06 AM     (H) 11/02/2019 02:06 AM    BUN 16 11/02/2019 02:06 AM    CREA 1.11 11/02/2019 02:06 AM    GFRAA >60 11/02/2019 02:06 AM    GFRNA 51 (L) 11/02/2019 02:06 AM     CBC:   Lab Results   Component Value Date/Time    WBC 5.4 11/02/2019 02:06 AM    HGB 11.2 (L) 11/02/2019 02:06 AM    HCT 34.1 (L) 11/02/2019 02:06 AM    PLT 285 11/02/2019 02:06 AM

## 2019-11-02 NOTE — DISCHARGE SUMMARY
4 Hayley Quiñonez MD, [facility]  General Surgery  Discharge Summary     Patient ID:  Zhou Navarrete  [id number]  1 y.o.  1965    Admit Date: 10/31/2019    Discharge Date: 11/2/2019    Admission Diagnoses: SBO (small bowel obstruction) ([facility] [K56.609]; Bilious vomiting [R11.14]; Abdominal pain [R10.9];S/P hernia repair [A08.742, Z87.19]; Partial small bowel obstruction ([email or web address] [K56.600]    Discharge Diagnoses:    Problem List as of 11/2/2019 Date Reviewed: 10/23/2019          Codes Class Noted - Resolved    Bilious vomiting ICD-10-CM: R11.14  ICD-9-CM: 787.04  10/31/2019 - Present        * (Principal) Ileus ([facility] ICD-10-CM: K56.7  ICD-9-CM: 560.1  10/31/2019 - Present        Abdominal pain ICD-10-CM: R10.9  ICD-9-CM: 789.00  10/31/2019 - Present        S/P hernia repair ICD-10-CM: Z98.890, Z87.19  ICD-9-CM: V45.89  10/31/2019 - Present        Partial small bowel obstruction ([facility] ICD-10-CM: K56.600  ICD-9-CM: 560.9  10/31/2019 - Present        CKD (chronic kidney disease) stage 3, GFR 30-59 ml/min ([facility]) ICD-10-CM: N18.3  ICD-9-CM: 585.3  10/31/2019 - Present        Incarcerated incisional hernia ICD-10-CM: K43.0  ICD-9-CM: 552.21  10/13/2019 - Present        COPD (chronic obstructive pulmonary disease) ([facility] ICD-10-CM: J44.9  ICD-9-CM: 099  9/28/2019 - Present        Hypertensive urgency ICD-10-CM: I16.0  ICD-9-CM: 401.9  9/28/2019 - Present        Tobacco use ICD-10-CM: Z72.0  ICD-9-CM: 305.1  9/28/2019 - Present        Acute bronchitis with chronic obstructive pulmonary disease (COPD) ([facility] ICD-10-CM: J44.0, J20.9  ICD-9-CM: 491.22  3/9/2019 - Present        T wave inversion in EKG ICD-10-CM: R94.31  ICD-9-CM: 794.31  3/9/2019 - Present        Hypertensive heart failure ([facility] ICD-10-CM: I11.0  ICD-9-CM: 402.91, 428.9  12/19/2017 - Present        Sleep apnea ICD-10-CM: G47.30  ICD-9-CM: 780.57  12/19/2017 - Present        COPD (chronic obstructive pulmonary disease) with chronic bronchitis ([place] [address] ICD-10-CM: J44.9  ICD-9-CM: 491.20  12/19/2017 - Present        Acute asthma exacerbation ICD-10-CM: J45.901  ICD-9-CM: 493.92  4/21/2017 - Present        Essential hypertension ICD-10-CM: I10  ICD-9-CM: 401.9  3/10/2017 - Present        Morbid obesity due to excess calories ([place] [address] ICD-10-CM: E66.01  ICD-9-CM: 278.01  3/10/2017 - Present        COPD with acute exacerbation ([place] [address] ICD-10-CM: J44.1  ICD-9-CM: 491.21  3/2/2017 - Present        Acute respiratory failure with hypercapnia ([place] [address] ICD-10-CM: J96.02  ICD-9-CM: 518.81  2/26/2017 - Present        Asthma exacerbation ICD-10-CM: J45.901  ICD-9-CM: 493.92  1/26/2017 - Present        Respiratory failure ([place] [address] ICD-10-CM: J96.90  ICD-9-CM: 518.81  1/11/2017 - Present        Acute encephalopathy ICD-10-CM: G93.40  ICD-9-CM: 348.30  1/11/2017 - Present        Acute exacerbation of chronic obstructive pulmonary disease (COPD) ([place] [address] ICD-10-CM: J44.1  ICD-9-CM: 491.21  8/29/2016 - Present        Acute on chronic respiratory failure with hypoxemia (HCC) ICD-10-CM: J96.21  ICD-9-CM: 518.84  3/12/2016 - Present        Asthma ICD-10-CM: J45.909  ICD-9-CM: 493.90  3/5/2016 - Present        Abnormal CT of the chest ICD-10-CM: R93.89  ICD-9-CM: 793.2  11/20/2015 - Present        Asthma exacerbation attacks ICD-10-CM: J45.901  ICD-9-CM: 493.92  10/19/2015 - Present        Status asthmaticus with COPD (chronic obstructive pulmonary disease) ([place] [address] ICD-10-CM: J44.9, J45.902  ICD-9-CM: 493.21  10/19/2015 - Present        RESOLVED: CAP (community acquired pneumonia) ICD-10-CM: J18.9  ICD-9-CM: 138  9/28/2019 - 10/13/2019        RESOLVED: Chest pain on breathing ICD-10-CM: R07.1  ICD-9-CM: 786.52  3/10/2017 - 10/13/2019        RESOLVED: COPD with exacerbation ([facility] ICD-10-CM: J44.1  ICD-9-CM: 491.21  7/11/2016 - 9/28/2019        RESOLVED: COPD exacerbation ([facility].) ICD-10-CM: J44.1  ICD-9-CM: 491.21  3/12/2016 - 9/28/2019        RESOLVED: Shortness of breath ICD-10-CM: R06.02  ICD-9-CM: 786.05  3/5/2016 - 9/28/2019        RESOLVED: COPD exacerbation ([facility] ICD-10-CM: J44.1  ICD-9-CM: 491.21  2/2/2016 - 2/8/2016        RESOLVED: COPD with exacerbation ([facility] ICD-10-CM: J44.1  ICD-9-CM: 491.21  2/2/2016 - 2/8/2016        RESOLVED: COPD with acute exacerbation ([facility] ICD-10-CM: J44.1  ICD-9-CM: 491.21  11/17/2015 - 11/20/2015        RESOLVED: Asthma exacerbation ICD-10-CM: J45.901  ICD-9-CM: 493.92  11/17/2015 - 11/20/2015               Admission Condition: [address]    Discharge Condition: Good    Last Procedure:     Hospital Course:   Normal hospital course for this procedure. Consults: None    Significant Diagnostic Studies: microbiology: urine culture: positive    Disposition: home    Patient Instructions:   Current Discharge Medication List      CONTINUE these medications which have NOT CHANGED    Details   amLODIPine (NORVASC) 10 mg tablet Take 1 Tab by mouth daily. Qty: 30 Tab, Refills: 0      ALPRAZolam (XANAX) 1 mg tablet Take 1 Tab by mouth two (2) times a day. Max Daily Amount: 2 mg. Qty: 20 Tab, Refills: 0      ibuprofen (MOTRIN) 800 mg tablet Take 1 Tab by mouth three (3) times daily as needed for Pain. Qty: 40 Tab, Refills: 0      docusate sodium (COLACE) 100 mg capsule Take 1 Cap by mouth two (2) times a day. Qty: 120 Cap, Refills: 0      ondansetron (ZOFRAN ODT) 4 mg disintegrating tablet Take 1 Tab by mouth every eight (8) hours as needed for Nausea. Indications: prevent nausea and vomiting after surgery  Qty: 20 Tab, Refills: 0      hydroCHLOROthiazide (HYDRODIURIL) 50 mg tablet Take 25 mg by mouth daily. QUEtiapine (SEROQUEL) 300 mg tablet Take 300 mg by mouth nightly. Indications: States she lost her daughter      albuterol sulfate 90 mcg/actuation aepb Take 2 Puffs by inhalation every four (4) hours as needed for Cough or Other (Wheezing).   Qty: 1 Inhaler, Refills: 0      fluticasone furoate-vilanterol (BREO ELLIPTA) 200-25 mcg/dose inhaler Take 1 Puff by inhalation daily. lisinopril (PRINIVIL, ZESTRIL) 20 mg tablet Take 20 mg by mouth daily. butalbital-acetaminophen-caff (FIORICET) -40 mg per capsule Take 1 Cap by mouth every four (4) hours as needed for Pain. Qty: 10 Cap, Refills: 0      arformoterol (BROVANA) 15 mcg/2 mL nebu neb solution 2 mL by Nebulization route two (2) times a day. Qty: 60 Vial, Refills: 0      budesonide (PULMICORT) 0.5 mg/2 mL nbsp 2 mL by Nebulization route two (2) times a day. Qty: 60 Each, Refills: 0      tiotropium (SPIRIVA) 18 mcg inhalation capsule Take 1 Cap by inhalation daily. Qty: 30 Cap, Refills: 0      atorvastatin (LIPITOR) 20 mg tablet Take 1 Tab by mouth nightly. Qty: 30 Tab, Refills: 0      cloNIDine HCl (CATAPRES) 0.2 mg tablet Take 1 Tab by mouth two (2) times a day. Qty: 60 Tab, Refills: 0      escitalopram oxalate (LEXAPRO) 10 mg tablet Take 1 Tab by mouth every evening. Qty: 30 Tab, Refills: 0      metoprolol tartrate (LOPRESSOR) 25 mg tablet Take 0.5 Tabs by mouth every twelve (12) hours. Qty: 30 Tab, Refills: 0      roflumilast (DALIRESP) 500 mcg tab tablet Take 1 Tab by mouth daily. Qty: 30 Tab, Refills: 0      OTHER Incentive spirometry- use as directed  Qty: 1 Each, Refills: 0      naloxone (NARCAN) 4 mg/actuation nasal spray Use 1 spray intranasally, then discard. Repeat with new spray every 2 min as needed for opioid overdose symptoms, alternating nostrils. Qty: 1 Each, Refills: 0      budesonide-formoterol (SYMBICORT) 160-4.5 mcg/actuation HFAA Take 2 Puffs by inhalation two (2) times a day. Qty: 1 Inhaler, Refills: 1      famotidine (PEPCID) 20 mg tablet Take 1 Tab by mouth daily. Qty: 30 Tab, Refills: 1      montelukast (SINGULAIR) 10 mg tablet Take 1 Tab by mouth nightly.   Qty: 30 Tab, Refills: 1         STOP taking these medications       oxyCODONE-acetaminophen (PERCOCET) 5-325 mg per tablet Comments:   Reason for Stopping:             Activity: See surgical instructions  Diet: Low fat plant based diet  Wound Care: As directed    Follow-up with Dr. Andrade Monday  in 2 weeks.   Follow-up tests/labs None    Signed:  Gabriel Melvin MD  11/2/2019  8:45 AM

## 2019-11-02 NOTE — PROGRESS NOTES
Problem: Patient Education: Go to Patient Education Activity  Goal: Patient/Family Education  Outcome: Progressing Towards Goal Cardiac

## 2019-11-02 NOTE — PROGRESS NOTES
Progress Note    Patient: Mike Primrose MRN: [id number]  SSN: [id number]    YOB: 1965  Age: 53 y.o. Sex: female      Admit Date: 10/31/2019    * No surgery found *    Procedure:      Subjective:     Patient has no new complaints. Markus small amt reg diet. No n/v.  Small BM w a little flatus. Objective:     Visit Vitals  BP (!) 188/98 (BP 1 Location: Left arm, BP Patient Position: At rest)   Pulse 73   Temp 98.1 °F (36.7 °C)   Resp 18   Ht 4' 11\" (1.499 m)   Wt 83.9 kg (185 lb)   SpO2 96%   Breastfeeding? No   BMI 37.37 kg/m²       Temp (24hrs), Av.3 °F (36.8 °C), Min:97.8 °F (36.6 °C), Max:98.7 °F (37.1 °C)      Physical Exam:    General:  Alert, cooperative, no distress. Lungs:   Clear to auscultation bilaterally. Heart:  Regular rate and rhythm. Abdomen:   Soft, approp incis tender only. Incision c/d/i x small superficial open area at inf aspect, no sign of infection. Bowel sounds normal.    Extremities: Extremities normal, atraumatic, no cyanosis or edema. No calf tenderness                       Data Review: images and reports reviewed    Lab Review: All lab results for the last 24 hours reviewed.  k 3.4    Assessment:     Hospital Problems  Date Reviewed: 10/23/2019          Codes Class Noted POA    Bilious vomiting ICD-10-CM: R11.14  ICD-9-CM: 787.04  10/31/2019 Unknown        * (Principal) Ileus ([facility] ICD-10-CM: K56.7  ICD-9-CM: 560.1  10/31/2019         Abdominal pain ICD-10-CM: R10.9  ICD-9-CM: 789.00  10/31/2019 Unknown        S/P hernia repair ICD-10-CM: Z98.890, Z87.19  ICD-9-CM: V45.89  10/31/2019 Unknown        Partial small bowel obstruction ([facility] ICD-10-CM: K56.600  ICD-9-CM: 560.9  10/31/2019 Unknown        CKD (chronic kidney disease) stage 3, GFR 30-59 ml/min (HCC) ICD-10-CM: N18.3  ICD-9-CM: 585.3  10/31/2019 Unknown              Plan/Recommendations/Medical Decision Making:     Continue present treatment.   pSBO vs postop ileus -- clinically resolving. Hypokalemia. Cont OOB. Advance PO as tolerated. Adjust K in IVF, recheck K in am.  Possible d/c tomorrow.

## 2019-11-02 NOTE — ROUTINE PROCESS
Bedside and Verbal shift change report given to Arnie Coronel RN (oncoming nurse) by Ford Valverde RN (offgoing nurse). Report included the following information Kardex and MAR.  
 
0700 End of Shift Note Bedside and verbal shift change report given to Rosio Albarado RN (On coming nurse) by Arnie Coronel RN (Off going nurse). Report included the following information:Procedure Summary, Intake/Output, MAR, Recent Results, Med Rec Status, and SBAR  (Situation, Background, Assessment and Recommendations) SBAR Recommendations: none Issues for Provider to address none Activity This Shift 
 
  [] Resting in bed 
 [] TDWB [] Dangled   
 [] Chair Ambulated to 
   [x] Bathroom [] BSC [] Refused Ambulated In 
  [] Room 
  [] Hallway 
  [] Bedrest ordered Bladder Scan Voiding Status [] Yes 
[x] Void [x] No 
[] Walker [] N/A 
[] I&O Cath Blood Sugars Managed [] Yes [x] No [] N/A Bowel Movement Passing Gas [x] Yes 
[x] Yes [] No 
[] No   
CHG Bath Done Before Surgery After Surgery  
[x] Yes 
[x] Yes  
[] No 
[] No  
[] N/A 
[] N/A Drain Removed [] Yes [x] No [] N/A Dressing Checked Dressing Changed [x] Yes 
[] Yes [] No 
[x] No [] N/A 
[] N/A Nausea/Vomiting [] Yes [x] No   
Ice Packs Changed [x] Yes [] No [] N/A Incentive Spirometer  [] Yes [] No Volume 1500 SCD Pumps On Bilaterally Ankle Pumping  [x] Yes 
[] Yes [] Yes 
[] No [] N/A 
[] N/A Telemetry Monitoring [] Yes [x] No Rhythm n/a Up to Chair for Meals [x] Yes [] No [] N/A

## 2019-11-03 ENCOUNTER — HOME CARE VISIT (OUTPATIENT)
Dept: SCHEDULING | Facility: HOME HEALTH | Age: 54
End: 2019-11-03
Payer: MEDICAID

## 2019-11-03 VITALS
HEIGHT: 59 IN | BODY MASS INDEX: 37.33 KG/M2 | TEMPERATURE: 97.9 F | DIASTOLIC BLOOD PRESSURE: 66 MMHG | RESPIRATION RATE: 19 BRPM | SYSTOLIC BLOOD PRESSURE: 115 MMHG | OXYGEN SATURATION: 98 % | WEIGHT: 185.19 LBS | HEART RATE: 77 BPM

## 2019-11-03 LAB
ANION GAP SERPL CALC-SCNC: 2 MMOL/L (ref 3–18)
BUN SERPL-MCNC: 11 MG/DL (ref 7–18)
BUN/CREAT SERPL: 10 (ref 12–20)
CALCIUM SERPL-MCNC: 8.2 MG/DL (ref 8.5–10.1)
CHLORIDE SERPL-SCNC: 114 MMOL/L (ref 100–111)
CO2 SERPL-SCNC: 28 MMOL/L (ref 21–32)
CREAT SERPL-MCNC: 1.05 MG/DL (ref 0.6–1.3)
GLUCOSE SERPL-MCNC: 115 MG/DL (ref 74–99)
MAGNESIUM SERPL-MCNC: 1.6 MG/DL (ref 1.6–2.6)
POTASSIUM SERPL-SCNC: 4.5 MMOL/L (ref 3.5–5.5)
SODIUM SERPL-SCNC: 144 MMOL/L (ref 136–145)

## 2019-11-03 PROCEDURE — 74011000250 HC RX REV CODE- 250: Performed by: INTERNAL MEDICINE

## 2019-11-03 PROCEDURE — 74011250636 HC RX REV CODE- 250/636: Performed by: INTERNAL MEDICINE

## 2019-11-03 PROCEDURE — 80048 BASIC METABOLIC PNL TOTAL CA: CPT

## 2019-11-03 PROCEDURE — 83735 ASSAY OF MAGNESIUM: CPT

## 2019-11-03 PROCEDURE — 94640 AIRWAY INHALATION TREATMENT: CPT

## 2019-11-03 PROCEDURE — 36415 COLL VENOUS BLD VENIPUNCTURE: CPT

## 2019-11-03 PROCEDURE — 74011000250 HC RX REV CODE- 250: Performed by: HOSPITALIST

## 2019-11-03 PROCEDURE — 74011250636 HC RX REV CODE- 250/636: Performed by: SURGERY

## 2019-11-03 PROCEDURE — 74011000258 HC RX REV CODE- 258: Performed by: SURGERY

## 2019-11-03 PROCEDURE — 74011250637 HC RX REV CODE- 250/637: Performed by: INTERNAL MEDICINE

## 2019-11-03 RX ORDER — MAGNESIUM SULFATE 1 G/100ML
1 INJECTION INTRAVENOUS ONCE
Status: COMPLETED | OUTPATIENT
Start: 2019-11-03 | End: 2019-11-03

## 2019-11-03 RX ORDER — CIPROFLOXACIN 250 MG/1
500 TABLET, FILM COATED ORAL 2 TIMES DAILY
Qty: 15 TAB | Refills: 0 | Status: SHIPPED | OUTPATIENT
Start: 2019-11-03 | End: 2020-03-23

## 2019-11-03 RX ORDER — FACIAL-BODY WIPES
10 EACH TOPICAL DAILY PRN
Status: DISCONTINUED | OUTPATIENT
Start: 2019-11-03 | End: 2019-11-03 | Stop reason: HOSPADM

## 2019-11-03 RX ADMIN — LISINOPRIL 20 MG: 20 TABLET ORAL at 08:25

## 2019-11-03 RX ADMIN — METOCLOPRAMIDE 5 MG: 5 INJECTION, SOLUTION INTRAMUSCULAR; INTRAVENOUS at 05:19

## 2019-11-03 RX ADMIN — DOCUSATE SODIUM 100 MG: 100 CAPSULE, LIQUID FILLED ORAL at 08:25

## 2019-11-03 RX ADMIN — MAGNESIUM SULFATE 1 G: 1 INJECTION INTRAVENOUS at 11:18

## 2019-11-03 RX ADMIN — ARFORMOTEROL TARTRATE 15 MCG: 15 SOLUTION RESPIRATORY (INHALATION) at 07:53

## 2019-11-03 RX ADMIN — METOPROLOL TARTRATE 12.5 MG: 25 TABLET ORAL at 08:25

## 2019-11-03 RX ADMIN — Medication 10 ML: at 15:20

## 2019-11-03 RX ADMIN — ROFLUMILAST 500 MCG: 500 TABLET ORAL at 08:25

## 2019-11-03 RX ADMIN — HYDROCHLOROTHIAZIDE 25 MG: 25 TABLET ORAL at 08:25

## 2019-11-03 RX ADMIN — Medication 10 ML: at 06:23

## 2019-11-03 RX ADMIN — KETOROLAC TROMETHAMINE 15 MG: 15 INJECTION, SOLUTION INTRAMUSCULAR; INTRAVENOUS at 11:17

## 2019-11-03 RX ADMIN — CIPROFLOXACIN 400 MG: 2 INJECTION, SOLUTION INTRAVENOUS at 08:24

## 2019-11-03 RX ADMIN — IPRATROPIUM BROMIDE AND ALBUTEROL SULFATE 3 ML: .5; 3 SOLUTION RESPIRATORY (INHALATION) at 07:53

## 2019-11-03 RX ADMIN — FAMOTIDINE 20 MG: 20 TABLET ORAL at 08:25

## 2019-11-03 RX ADMIN — BUDESONIDE 500 MCG: 0.5 INHALANT RESPIRATORY (INHALATION) at 07:53

## 2019-11-03 RX ADMIN — AMLODIPINE BESYLATE 10 MG: 10 TABLET ORAL at 08:25

## 2019-11-03 RX ADMIN — SODIUM CHLORIDE 12.5 MG: 900 INJECTION, SOLUTION INTRAVENOUS at 06:21

## 2019-11-03 RX ADMIN — KETOROLAC TROMETHAMINE 15 MG: 15 INJECTION, SOLUTION INTRAMUSCULAR; INTRAVENOUS at 05:19

## 2019-11-03 RX ADMIN — IPRATROPIUM BROMIDE AND ALBUTEROL SULFATE 3 ML: .5; 3 SOLUTION RESPIRATORY (INHALATION) at 14:46

## 2019-11-03 RX ADMIN — METOCLOPRAMIDE 5 MG: 5 INJECTION, SOLUTION INTRAMUSCULAR; INTRAVENOUS at 11:17

## 2019-11-03 RX ADMIN — CLONIDINE HYDROCHLORIDE 0.2 MG: 0.1 TABLET ORAL at 08:25

## 2019-11-03 NOTE — ROUTINE PROCESS
Discharge instructions and medications reviewed with patient. IV removed from arm, pressure dressing applied. Vitals within normal limits, patient stable for discharge. Left unit via wheelchair.

## 2019-11-03 NOTE — ROUTINE PROCESS
Bedside and Verbal shift change report given to ROSALINE Chang (oncoming nurse) by Parvez Romo (offgoing nurse). Report included the following information SBAR, Kardex, Intake/Output, MAR and Recent Results.

## 2019-11-03 NOTE — PROGRESS NOTES
Problem: Patient Education: Go to Patient Education Activity  Goal: Patient/Family Education  Outcome: Progressing Towards Goal     Problem: Small Bowel Obstruction: Day 1  Goal: Off Pathway (Use only if patient is Off Pathway)  Outcome: Progressing Towards Goal  Goal: Activity/Safety  Outcome: Progressing Towards Goal  Goal: Consults, if ordered  Outcome: Progressing Towards Goal  Goal: Diagnostic Test/Procedures  Outcome: Progressing Towards Goal  Goal: Nutrition/Diet  Outcome: Progressing Towards Goal  Goal: Medications  Outcome: Progressing Towards Goal  Goal: Respiratory  Outcome: Progressing Towards Goal  Goal: Treatments/Interventions/Procedures  Outcome: Progressing Towards Goal  Goal: Psychosocial  Outcome: Progressing Towards Goal  Goal: *Optimal pain control at patient's stated goal  Outcome: Progressing Towards Goal  Goal: *Adequate urinary output (equal to or greater than 30 milliliters/hour)  Outcome: Progressing Towards Goal  Goal: *Hemodynamically stable  Outcome: Progressing Towards Goal  Goal: *Demonstrates progressive activity  Outcome: Progressing Towards Goal  Goal: *Absence of nausea/vomiting  Outcome: Progressing Towards Goal     Problem: Small Bowel Obstruction: Day 2  Goal: Off Pathway (Use only if patient is Off Pathway)  Outcome: Progressing Towards Goal  Goal: Activity/Safety  Outcome: Progressing Towards Goal  Goal: Consults, if ordered  Outcome: Progressing Towards Goal  Goal: Diagnostic Test/Procedures  Outcome: Progressing Towards Goal  Goal: Nutrition/Diet  Outcome: Progressing Towards Goal  Goal: Discharge Planning  Outcome: Progressing Towards Goal  Goal: Medications  Outcome: Progressing Towards Goal  Goal: Respiratory  Outcome: Progressing Towards Goal  Goal: Treatments/Interventions/Procedures  Outcome: Progressing Towards Goal  Goal: Psychosocial  Outcome: Progressing Towards Goal  Goal: *Optimal pain control at patient's stated goal  Outcome: Progressing Towards Goal  Goal: *Adequate urinary output (equal to or greater than 30 milliliters/hour)  Outcome: Progressing Towards Goal  Goal: *Hemodynamically stable  Outcome: Progressing Towards Goal  Goal: *Demonstrates progressive activity  Outcome: Progressing Towards Goal  Goal: *Absence of nausea/vomiting  Outcome: Progressing Towards Goal  Goal: *Return of normal bowel function  Outcome: Progressing Towards Goal     Problem: Small Bowel Obstruction: Day 3  Goal: Off Pathway (Use only if patient is Off Pathway)  Outcome: Progressing Towards Goal  Goal: Activity/Safety  Outcome: Progressing Towards Goal  Goal: Consults, if ordered  Outcome: Progressing Towards Goal  Goal: Diagnostic Test/Procedures  Outcome: Progressing Towards Goal  Goal: Nutrition/Diet  Outcome: Progressing Towards Goal  Goal: Discharge Planning  Outcome: Progressing Towards Goal  Goal: Medications  Outcome: Progressing Towards Goal  Goal: Respiratory  Outcome: Progressing Towards Goal  Goal: Treatments/Interventions/Procedures  Outcome: Progressing Towards Goal  Goal: Psychosocial  Outcome: Progressing Towards Goal  Goal: *Optimal pain control at patient's stated goal  Outcome: Progressing Towards Goal  Goal: *Adequate urinary output (equal to or greater than 30 milliliters/hour)  Outcome: Progressing Towards Goal  Goal: *Hemodynamically stable  Outcome: Progressing Towards Goal  Goal: *Adequate nutrition  Outcome: Progressing Towards Goal  Goal: *Demonstrates progressive activity  Outcome: Progressing Towards Goal  Goal: *Participates in discharge planning  Outcome: Progressing Towards Goal     Problem: Small Bowel Obstruction: Day 4 to Discharge  Goal: Off Pathway (Use only if patient is Off Pathway)  Outcome: Progressing Towards Goal  Goal: Activity/Safety  Outcome: Progressing Towards Goal  Goal: Nutrition/Diet  Outcome: Progressing Towards Goal  Goal: Discharge Planning  Outcome: Progressing Towards Goal  Goal: Medications  Outcome: Progressing Towards Goal  Goal: Respiratory  Outcome: Progressing Towards Goal  Goal: Treatments/Interventions/Procedures  Outcome: Progressing Towards Goal  Goal: Psychosocial  Outcome: Progressing Towards Goal     Problem: Small Bowel Obstruction - Non Surgical: Discharge Outcomes  Goal: *Hemodynamically stable  Outcome: Progressing Towards Goal  Goal: *Demonstrates independent activity or return to baseline  Outcome: Progressing Towards Goal  Goal: *Optimal pain control at patient's stated goal  Outcome: Progressing Towards Goal  Goal: *Verbalizes understanding and describes prescribed diet  Outcome: Progressing Towards Goal  Goal: *Tolerating diet  Outcome: Progressing Towards Goal  Goal: *Verbalizes name, dosage, time, side effects, and number of days to continue medications  Outcome: Progressing Towards Goal  Goal: *Anxiety reduced or absent  Outcome: Progressing Towards Goal  Goal: *Understands and describes signs and symptoms to report to providers(Stroke Metric)  Outcome: Progressing Towards Goal  Goal: *Describes follow-up/return visits to physicians  Outcome: Progressing Towards Goal  Goal: *Describes available resources and support systems  Outcome: Progressing Towards Goal  Goal: *Active bowel function  Outcome: Progressing Towards Goal     Problem: Pain  Goal: *Control of Pain  Outcome: Progressing Towards Goal  Goal: *PALLIATIVE CARE:  Alleviation of Pain  Outcome: Progressing Towards Goal     Problem: Patient Education: Go to Patient Education Activity  Goal: Patient/Family Education  Outcome: Progressing Towards Goal

## 2019-11-03 NOTE — PROGRESS NOTES
Progress Note    Patient: Leesa Martinez MRN: [id number]  SSN: [id number]    YOB: 1965  Age: 53 y.o. Sex: female      Admit Date: 10/31/2019    * No surgery found *    Procedure:      Subjective:     Patient has no new complaints. Markus a little more reg diet. Had a little nausea last night, feeling much better today. No BM today, but cont to pass flatus. Objective:     Visit Vitals  /66 (BP 1 Location: Left arm, BP Patient Position: At rest)   Pulse 77   Temp 97.9 °F (36.6 °C)   Resp 19   Ht 4' 11\" (1.499 m)   Wt 84 kg (185 lb 3 oz)   SpO2 98%   Breastfeeding? No   BMI 37.40 kg/m²       Temp (24hrs), Av °F (36.7 °C), Min:97.7 °F (36.5 °C), Max:98.5 °F (36.9 °C)      Physical Exam:    General:  Alert, cooperative, no distress. Lungs:   Clear to auscultation bilaterally. Heart:  Regular rate and rhythm. Abdomen:   Soft, approp incis tender only. Incision c/d/i x small superficial open area at inf aspect, no sign of infection. Bowel sounds normal.    Extremities: Extremities normal, atraumatic, no cyanosis or edema. No calf tenderness                       Data Review: images and reports reviewed    Lab Review:    All lab results for the last 24 hours reviewed.  k 4.5    Assessment:     Hospital Problems  Date Reviewed: 10/23/2019          Codes Class Noted POA    Bilious vomiting ICD-10-CM: R11.14  ICD-9-CM: 787.04  10/31/2019 Unknown        * (Principal) Ileus ([facility] ICD-10-CM: K56.7  ICD-9-CM: 560.1  10/31/2019         Abdominal pain ICD-10-CM: R10.9  ICD-9-CM: 789.00  10/31/2019 Unknown        S/P hernia repair ICD-10-CM: Z98.890, Z87.19  ICD-9-CM: V45.89  10/31/2019 Unknown        Partial small bowel obstruction ([facility] ICD-10-CM: K56.600  ICD-9-CM: 560.9  10/31/2019 Unknown        CKD (chronic kidney disease) stage 3, GFR 30-59 ml/min ([facility]) ICD-10-CM: N18.3  ICD-9-CM: 585.3  10/31/2019 Unknown              Plan/Recommendations/Medical Decision Making: Continue present treatment. pSBO vs postop ileus -- clinically resolving. Hypokalemia - improved. Cont OOB. Advance PO as tolerated. Pt wants to go home today. F/U with Dr. Steven Celestin in 1-2 weeks.

## 2019-11-03 NOTE — PROGRESS NOTES
SUBJECTIVE:    Patient is dressed up and walking in hallway. I took her back in the room to discuss about her question as she started asking the question while in the hallway. Patient stated that last evening she had some abdominal pain and nausea. She ate the food this morning without any problem. She states she is passing gas but no bowel movement yet. Denies for any chest pain or shortness of breath or cough. No headache or dizziness. She has all her home medications at home including 3 L home oxygen. OBJECTIVE:    /82   Pulse 84   Temp 97.7 °F (36.5 °C)   Resp 18   Ht 4' 11\" (1.499 m)   Wt 84 kg (185 lb 3 oz)   LMP 04/14/2009   SpO2 98%   Breastfeeding? No   BMI 37.40 kg/m²     General appearance - alert, well appearing, and in no distress  Chest -normal air entry noted in bases, no wheezes  Heart - S1 and S2 normal  Abdomen - soft, nondistended,  bowel sounds present  Neurological - alert, oriented, normal speech, no focal findings noted   Extremities - no pedal edema noted    ASSESSMENT:    1. Partial small bowel obstruction and status post hernia repair on October 28, 2019, clinically better  2. Hypertension  3. COPD without exacerbation  4. Anxiety and depression  5. Hypokalemia  6. Stage III chronic kidney disease  7. GERD  8. Chronic hypoxic respiratory failure due to COPD on 3 L home oxygen continuous. PLAN:    Continue current management  Surgical team to follow this patient for disposition plan.   Nothing to add from my side today  She can be discharged home today or tomorrow from my point of view    BMP:   Lab Results   Component Value Date/Time     11/03/2019 03:21 AM    K 4.5 11/03/2019 03:21 AM     (H) 11/03/2019 03:21 AM    CO2 28 11/03/2019 03:21 AM    AGAP 2 (L) 11/03/2019 03:21 AM     (H) 11/03/2019 03:21 AM    BUN 11 11/03/2019 03:21 AM    CREA 1.05 11/03/2019 03:21 AM    GFRAA >60 11/03/2019 03:21 AM    GFRNA 54 (L) 11/03/2019 03:21 AM CBC:   No results found for: WBC, HGB, HGBEXT, HCT, HCTEXT, PLT, PLTEXT, HGBEXT, HCTEXT, PLTEXT

## 2019-11-03 NOTE — DISCHARGE INSTRUCTIONS
Patient Education   Patient Education     DISCHARGE SUMMARY from Nurse    PATIENT INSTRUCTIONS:    After general anesthesia or intravenous sedation, for 24 hours or while taking prescription Narcotics:  · Limit your activities  · Do not drive and operate hazardous machinery  · Do not make important personal or business decisions  · Do  not drink alcoholic beverages  · If you have not urinated within 8 hours after discharge, please contact your surgeon on call. Report the following to your surgeon:  · Excessive pain, swelling, redness or odor of or around the surgical area  · Temperature over 100.5  · Nausea and vomiting lasting longer than 4 hours or if unable to take medications  · Any signs of decreased circulation or nerve impairment to extremity: change in color, persistent  numbness, tingling, coldness or increase pain  · Any questions    What to do at Home:  Recommended activity: Activity as tolerated. *  Please give a list of your current medications to your Primary Care Provider. *  Please update this list whenever your medications are discontinued, doses are      changed, or new medications (including over-the-counter products) are added. *  Please carry medication information at all times in case of emergency situations. These are general instructions for a healthy lifestyle:    No smoking/ No tobacco products/ Avoid exposure to second hand smoke  Surgeon General's Warning:  Quitting smoking now greatly reduces serious risk to your health.     Obesity, smoking, and sedentary lifestyle greatly increases your risk for illness    A healthy diet, regular physical exercise & weight monitoring are important for maintaining a healthy lifestyle    You may be retaining fluid if you have a history of heart failure or if you experience any of the following symptoms:  Weight gain of 3 pounds or more overnight or 5 pounds in a week, increased swelling in our hands or feet or shortness of breath while lying flat in bed. Please call your doctor as soon as you notice any of these symptoms; do not wait until your next office visit. The discharge information has been reviewed with the patient. The patient verbalized understanding. Discharge medications reviewed with the patient and appropriate educational materials and side effects teaching were provided. ___________________________________________________________________________________________________________________________________     Urinary Tract Infection in Women: Care Instructions  Your Care Instructions    A urinary tract infection, or UTI, is a general term for an infection anywhere between the kidneys and the urethra (where urine comes out). Most UTIs are bladder infections. They often cause pain or burning when you urinate. UTIs are caused by bacteria and can be cured with antibiotics. Be sure to complete your treatment so that the infection goes away. Follow-up care is a key part of your treatment and safety. Be sure to make and go to all appointments, and call your doctor if you are having problems. It's also a good idea to know your test results and keep a list of the medicines you take. How can you care for yourself at home? · Take your antibiotics as directed. Do not stop taking them just because you feel better. You need to take the full course of antibiotics. · Drink extra water and other fluids for the next day or two. This may help wash out the bacteria that are causing the infection. (If you have kidney, heart, or liver disease and have to limit fluids, talk with your doctor before you increase your fluid intake.)  · Avoid drinks that are carbonated or have caffeine. They can irritate the bladder. · Urinate often. Try to empty your bladder each time. · To relieve pain, take a hot bath or lay a heating pad set on low over your lower belly or genital area. Never go to sleep with a heating pad in place.   To prevent UTIs  · Drink plenty of water each day. This helps you urinate often, which clears bacteria from your system. (If you have kidney, heart, or liver disease and have to limit fluids, talk with your doctor before you increase your fluid intake.)  · Urinate when you need to. · Urinate right after you have sex. · Change sanitary pads often. · Avoid douches, bubble baths, feminine hygiene sprays, and other feminine hygiene products that have deodorants. · After going to the bathroom, wipe from front to back. When should you call for help? Call your doctor now or seek immediate medical care if:    · Symptoms such as fever, chills, nausea, or vomiting get worse or appear for the first time.     · You have new pain in your back just below your rib cage. This is called flank pain.     · There is new blood or pus in your urine.     · You have any problems with your antibiotic medicine.    Watch closely for changes in your health, and be sure to contact your doctor if:    · You are not getting better after taking an antibiotic for 2 days.     · Your symptoms go away but then come back. Where can you learn more? Go to http://magda-kadie.info/. Enter K521 in the search box to learn more about \"Urinary Tract Infection in Women: Care Instructions. \"  Current as of: December 19, 2018  Content Version: 12.2  © 9267-7094 [employer]. Care instructions adapted under license by [employer] (which disclaims liability or warranty for this information). If you have questions about a medical condition or this instruction, always ask your healthcare professional. [place] [address] any warranty or liability for your use of this information. Learning About Ileus  What is ileus? Ileus (say \"ILL-ee-us\") is a blockage of the bowel (intestines) that happens when the intestines don't work as they should. Normally, muscles in the intestines squeeze to push food and waste along.  When this process stops, the intestines stop digesting food and moving waste out of the body. This may also be called paralytic ileus. Ileus is not the same as a mechanical bowel obstruction. In a mechanical bowel obstruction, something is actually blocking the intestine, like scar tissue or a tumor. That is not true in ileus. Ileus sometimes happens after surgery to the belly. But it can also be caused by other things, such as some medicines, certain diseases or infections, and nerve problems. The doctor may do a number of tests. These tests may include X-rays, blood tests, and a CT scan. Testing can help the doctor be sure that nothing is blocking the intestines. Most people who have ileus need to be treated in the hospital.  What are the symptoms? Symptoms may include:  · Cramping belly pain. · Bloating. · Nausea or vomiting. · Not passing stool or gas. How is ileus treated? You will need to avoid eating solid food until you are better. Instead, you will get fluids and nutrition through a vein (IV). This helps prevent dehydration. It also lets your bowel rest.  You may have a tube that goes through your nose and into your stomach. This can help ease pain and bloating. You may get other treatments, depending on what caused ileus. For example, a medicine might be stopped if it is affecting your bowel. The intestines will often start working again in a few days. Signs of this include being able to pass gas or have a bowel movement. As your intestines start working, you will switch slowly from a liquid diet back to solid foods. Follow-up care is a key part of your treatment and safety. Be sure to make and go to all appointments, and call your doctor if you are having problems. It's also a good idea to know your test results and keep a list of the medicines you take. Where can you learn more? Go to http://magda-kadie.info/.   Enter V131 in the search box to learn more about \"Learning About Ileus. \"  Current as of: November 7, 2018  Content Version: 12.2  © 5811-4762 [employer]. Care instructions adapted under license by [employer] (which disclaims liability or warranty for this information). If you have questions about a medical condition or this instruction, always ask your healthcare professional. [place] [address] any warranty or liability for your use of this information.

## 2019-11-03 NOTE — PROGRESS NOTES
Discharge planning    Discharge order noted for today. Patient and agreeable to the transition plan today. Transport has been arranged with family. No need identified from . Jason Jen Updated bedside RN, Delmy Loera, to the transition plan.          CHARITO GrantN, RN  Pager # [phone]  Care Manager

## 2019-11-03 NOTE — ROUTINE PROCESS
Assumed care of patient. Bedside verbal report received from [address], RN including SBAR, MAR, and Kardex. Vitals within normal limits. Assessment completed, patient in no apparent distress.

## 2019-11-21 ENCOUNTER — APPOINTMENT (OUTPATIENT)
Dept: GENERAL RADIOLOGY | Age: 54
End: 2019-11-21
Attending: PHYSICIAN ASSISTANT
Payer: MEDICAID

## 2019-11-21 ENCOUNTER — HOSPITAL ENCOUNTER (EMERGENCY)
Age: 54
Discharge: HOME | End: 2019-11-22
Attending: EMERGENCY MEDICINE
Payer: MEDICAID

## 2019-11-21 DIAGNOSIS — R45.851 SUICIDAL IDEATION: ICD-10-CM

## 2019-11-21 DIAGNOSIS — F11.90 OPIATE USE: Primary | ICD-10-CM

## 2019-11-21 LAB
AMPHET UR QL SCN: NEGATIVE
ANION GAP SERPL CALC-SCNC: 4 MMOL/L (ref 3–18)
APPEARANCE UR: CLEAR
BACTERIA URNS QL MICRO: ABNORMAL /HPF
BARBITURATES UR QL SCN: NEGATIVE
BASOPHILS # BLD: 0 K/UL (ref 0–0.1)
BASOPHILS NFR BLD: 1 % (ref 0–2)
BENZODIAZ UR QL: NEGATIVE
BILIRUB UR QL: NEGATIVE
BUN SERPL-MCNC: 25 MG/DL (ref 7–18)
BUN/CREAT SERPL: 21 (ref 12–20)
CALCIUM SERPL-MCNC: 8.8 MG/DL (ref 8.5–10.1)
CANNABINOIDS UR QL SCN: NEGATIVE
CHLORIDE SERPL-SCNC: 108 MMOL/L (ref 100–111)
CK MB CFR SERPL CALC: NORMAL % (ref 0–4)
CK MB SERPL-MCNC: <1 NG/ML (ref 5–25)
CK SERPL-CCNC: 39 U/L (ref 26–192)
CO2 SERPL-SCNC: 30 MMOL/L (ref 21–32)
COCAINE UR QL SCN: NEGATIVE
COLOR UR: YELLOW
CREAT SERPL-MCNC: 1.21 MG/DL (ref 0.6–1.3)
DIFFERENTIAL METHOD BLD: NORMAL
EOSINOPHIL # BLD: 0.3 K/UL (ref 0–0.4)
EOSINOPHIL NFR BLD: 4 % (ref 0–5)
EPITH CASTS URNS QL MICRO: ABNORMAL /LPF (ref 0–5)
ERYTHROCYTE [DISTWIDTH] IN BLOOD BY AUTOMATED COUNT: 13 % (ref 11.6–14.5)
ETHANOL SERPL-MCNC: <3 MG/DL (ref 0–3)
GLUCOSE SERPL-MCNC: 101 MG/DL (ref 74–99)
GLUCOSE UR STRIP.AUTO-MCNC: NEGATIVE MG/DL
HCT VFR BLD AUTO: 38.2 % (ref 35–45)
HDSCOM,HDSCOM: ABNORMAL
HGB BLD-MCNC: 12.4 G/DL (ref 12–16)
HGB UR QL STRIP: NEGATIVE
KETONES UR QL STRIP.AUTO: NEGATIVE MG/DL
LEUKOCYTE ESTERASE UR QL STRIP.AUTO: NEGATIVE
LYMPHOCYTES # BLD: 2.8 K/UL (ref 0.9–3.6)
LYMPHOCYTES NFR BLD: 33 % (ref 21–52)
MCH RBC QN AUTO: 28.5 PG (ref 24–34)
MCHC RBC AUTO-ENTMCNC: 32.5 G/DL (ref 31–37)
MCV RBC AUTO: 87.8 FL (ref 74–97)
METHADONE UR QL: NEGATIVE
MONOCYTES # BLD: 0.4 K/UL (ref 0.05–1.2)
MONOCYTES NFR BLD: 5 % (ref 3–10)
NEUTS SEG # BLD: 5.1 K/UL (ref 1.8–8)
NEUTS SEG NFR BLD: 57 % (ref 40–73)
NITRITE UR QL STRIP.AUTO: NEGATIVE
OPIATES UR QL: POSITIVE
PCP UR QL: NEGATIVE
PH UR STRIP: 5.5 [PH] (ref 5–8)
PLATELET # BLD AUTO: 218 K/UL (ref 135–420)
PMV BLD AUTO: 10.8 FL (ref 9.2–11.8)
POTASSIUM SERPL-SCNC: 3.9 MMOL/L (ref 3.5–5.5)
PROT UR STRIP-MCNC: ABNORMAL MG/DL
RBC # BLD AUTO: 4.35 M/UL (ref 4.2–5.3)
RBC #/AREA URNS HPF: ABNORMAL /HPF (ref 0–5)
SODIUM SERPL-SCNC: 142 MMOL/L (ref 136–145)
SP GR UR REFRACTOMETRY: 1.03 (ref 1–1.03)
TROPONIN I SERPL-MCNC: <0.02 NG/ML (ref 0–0.04)
UROBILINOGEN UR QL STRIP.AUTO: 0.2 EU/DL (ref 0.2–1)
WBC # BLD AUTO: 8.6 K/UL (ref 4.6–13.2)
WBC URNS QL MICRO: ABNORMAL /HPF (ref 0–4)

## 2019-11-21 PROCEDURE — 81001 URINALYSIS AUTO W/SCOPE: CPT

## 2019-11-21 PROCEDURE — 82550 ASSAY OF CK (CPK): CPT

## 2019-11-21 PROCEDURE — 99285 EMERGENCY DEPT VISIT HI MDM: CPT

## 2019-11-21 PROCEDURE — 80048 BASIC METABOLIC PNL TOTAL CA: CPT

## 2019-11-21 PROCEDURE — 85025 COMPLETE CBC W/AUTO DIFF WBC: CPT

## 2019-11-21 PROCEDURE — 74011250637 HC RX REV CODE- 250/637: Performed by: PHYSICIAN ASSISTANT

## 2019-11-21 PROCEDURE — 71046 X-RAY EXAM CHEST 2 VIEWS: CPT

## 2019-11-21 PROCEDURE — 80307 DRUG TEST PRSMV CHEM ANLYZR: CPT

## 2019-11-21 PROCEDURE — 93005 ELECTROCARDIOGRAM TRACING: CPT

## 2019-11-21 RX ORDER — IPRATROPIUM BROMIDE AND ALBUTEROL SULFATE 2.5; .5 MG/3ML; MG/3ML
3 SOLUTION RESPIRATORY (INHALATION)
Status: DISPENSED | OUTPATIENT
Start: 2019-11-21 | End: 2019-11-22

## 2019-11-21 RX ORDER — ACETAMINOPHEN 325 MG/1
650 TABLET ORAL
Status: COMPLETED | OUTPATIENT
Start: 2019-11-21 | End: 2019-11-21

## 2019-11-21 RX ADMIN — ACETAMINOPHEN 650 MG: 325 TABLET ORAL at 22:40

## 2019-11-21 NOTE — ED PROVIDER NOTES
EMERGENCY DEPARTMENT HISTORY AND PHYSICAL EXAM      Date: 11/21/2019  Patient Name: Mike Primrose    History of Presenting Illness     Chief Complaint   Patient presents with    Drug Problem    Shortness of Breath       History Provided By: Patient    HPI: Mike Primrose, 53 y.o. female PMHx significant for COPD, asthma, htn, CHF, hypercholesterolemia, presents ambulatory to the ED. Patient states she is here today for clearance so that she can attend a detox program at [facility]. Patient reports last heroin use was 1100 this morning. Patient admits to snorting heroin daily. Patient denies cocaine and other illicit drug use. Denies alcohol use. Patient also reports a history of COPD with chronic S OB. Patient reports she has not been using inhalers with increasing S OB over the past few days. Patient denies chest pain, dizziness. Patient denies cough, congestion, fever/chills. Has not taken anything for symptoms. Patient rates discomfort as 5/10. There are no other complaints, changes, or physical findings at this time. PCP: Abdullahi Flores NP    No current facility-administered medications on file prior to encounter. Current Outpatient Medications on File Prior to Encounter   Medication Sig Dispense Refill    OXYGEN-AIR DELIVERY SYSTEMS 3 L by IntraNASal route as needed for Other (sob).  ibuprofen (MOTRIN) 800 mg tablet Take 1 Tab by mouth three (3) times daily as needed for Pain. 40 Tab 0    docusate sodium (COLACE) 100 mg capsule Take 1 Cap by mouth two (2) times a day. 120 Cap 0    ondansetron (ZOFRAN ODT) 4 mg disintegrating tablet Take 1 Tab by mouth every eight (8) hours as needed for Nausea. Indications: prevent nausea and vomiting after surgery 20 Tab 0    hydroCHLOROthiazide (HYDRODIURIL) 50 mg tablet Take 25 mg by mouth daily.  QUEtiapine (SEROQUEL) 300 mg tablet Take 300 mg by mouth nightly.  Indications: States she lost her daughter      albuterol sulfate 90 mcg/actuation aepb Take 2 Puffs by inhalation every four (4) hours as needed for Cough or Other (Wheezing). 1 Inhaler 0    fluticasone furoate-vilanterol (BREO ELLIPTA) 200-25 mcg/dose inhaler Take 1 Puff by inhalation daily.  lisinopril (PRINIVIL, ZESTRIL) 20 mg tablet Take 20 mg by mouth daily.  arformoterol (BROVANA) 15 mcg/2 mL nebu neb solution 2 mL by Nebulization route two (2) times a day. 60 Vial 0    budesonide (PULMICORT) 0.5 mg/2 mL nbsp 2 mL by Nebulization route two (2) times a day. 60 Each 0    tiotropium (SPIRIVA) 18 mcg inhalation capsule Take 1 Cap by inhalation daily. 30 Cap 0    atorvastatin (LIPITOR) 20 mg tablet Take 1 Tab by mouth nightly. 30 Tab 0    cloNIDine HCl (CATAPRES) 0.2 mg tablet Take 1 Tab by mouth two (2) times a day. 60 Tab 0    escitalopram oxalate (LEXAPRO) 10 mg tablet Take 1 Tab by mouth every evening. 30 Tab 0    metoprolol tartrate (LOPRESSOR) 25 mg tablet Take 0.5 Tabs by mouth every twelve (12) hours. 30 Tab 0    roflumilast (DALIRESP) 500 mcg tab tablet Take 1 Tab by mouth daily. 30 Tab 0    budesonide-formoterol (SYMBICORT) 160-4.5 mcg/actuation HFAA Take 2 Puffs by inhalation two (2) times a day. 1 Inhaler 1    famotidine (PEPCID) 20 mg tablet Take 1 Tab by mouth daily. 30 Tab 1    montelukast (SINGULAIR) 10 mg tablet Take 1 Tab by mouth nightly. 30 Tab 1    ciprofloxacin HCl (CIPRO) 250 mg tablet Take 2 Tabs by mouth two (2) times a day. Indications: Bacterial Urinary Tract Infection 15 Tab 0    amLODIPine (NORVASC) 10 mg tablet Take 1 Tab by mouth daily. 30 Tab 0    OTHER Incentive spirometry- use as directed 1 Each 0    naloxone (NARCAN) 4 mg/actuation nasal spray Use 1 spray intranasally, then discard. Repeat with new spray every 2 min as needed for opioid overdose symptoms, alternating nostrils.  1 Each 0       Past History     Past Medical History:  Past Medical History:   Diagnosis Date    Asthma     [facility]     Chronic obstructive pulmonary disease (HCC)     Dependence on supplemental oxygen     Endocrine disease     thyroid issues    Gastrointestinal disorder     \"blockage in my stomach\"    Hypercholesterolemia     Hypertension        Past Surgical History:  Past Surgical History:   Procedure Laterality Date    HX GI      Exploratory laparotomy with lysis of adhesions and primary repair of incarcerated umbilical hernia       Family History:  History reviewed. No pertinent family history. Social History:  Social History     Tobacco Use    Smoking status: Current Every Day Smoker     Packs/day: 0.25    Smokeless tobacco: Former User   Substance Use Topics    Alcohol use: No     Comment: socially    Drug use: Not Currently     Types: Heroin       Allergies:  No Known Allergies      Review of Systems   Review of Systems   Constitutional: Negative for chills and fever. Respiratory: Positive for shortness of breath. Negative for chest tightness. Cardiovascular: Negative for chest pain. Gastrointestinal: Negative for abdominal pain, nausea and vomiting. Genitourinary: Negative for flank pain. Musculoskeletal: Negative for back pain and myalgias. Skin: Negative for color change, pallor, rash and wound. Neurological: Negative for dizziness, weakness and light-headedness. All other systems reviewed and are negative. Physical Exam   Physical Exam  Vitals signs and nursing note reviewed. Constitutional:       General: She is not in acute distress. Appearance: She is well-developed. HENT:      Head: Normocephalic and atraumatic. Eyes:      Conjunctiva/sclera: Conjunctivae normal.   Cardiovascular:      Rate and Rhythm: Normal rate and regular rhythm. Heart sounds: Normal heart sounds. Pulmonary:      Effort: Pulmonary effort is normal. No respiratory distress. Breath sounds: Normal breath sounds.       Comments: Slightly decreased lung sounds in all lung fields  No wheezing auscultated  No intercostal retractions and patient not working to breathe  Abdominal:      General: Bowel sounds are normal. There is no distension. Palpations: Abdomen is soft. Musculoskeletal: Normal range of motion. Skin:     General: Skin is warm. Findings: No rash. Neurological:      Mental Status: She is alert and oriented to person, place, and time. Psychiatric:         Behavior: Behavior normal.         Diagnostic Study Results     Labs -   No results found for this or any previous visit (from the past 12 hour(s)). Radiologic Studies -   XR CHEST PA LAT   Final Result   IMPRESSION:      No confluent consolidation or acute cardiopulmonary process. Questionable subcentimeter nodular densities projecting over the eighth   posterior left lateral rib, not definitively seen on prior study. As initial   evaluation study, perhaps a nonemergent short-term follow-up repeat chest   radiograph may be helpful as clinically warranted. CT Results  (Last 48 hours)    None        CXR Results  (Last 48 hours)               11/21/19 1856  XR CHEST PA LAT Final result    Impression:  IMPRESSION:       No confluent consolidation or acute cardiopulmonary process. Questionable subcentimeter nodular densities projecting over the eighth   posterior left lateral rib, not definitively seen on prior study. As initial   evaluation study, perhaps a nonemergent short-term follow-up repeat chest   radiograph may be helpful as clinically warranted. Narrative:  Chest PA and lateral       INDICATION: Shortness of breath       COMPARISON: 11/1/2019       FINDINGS:       Two views of the chest were obtained. No confluent consolidation. Questionable   subcentimeter nodular densities projecting over the eighth posterior left   lateral rib, not definitively seen on prior study. Cardiac silhouette is stable. Pulmonary vascularity is within normal limits.  No acute osseous abnormality. Medical Decision Making   I am the first provider for this patient. I reviewed the vital signs, available nursing notes, past medical history, past surgical history, family history and social history. Vital Signs-Reviewed the patient's vital signs. No data found. EKG interpretation: (Preliminary)  Rhythm: normal sinus rhythm; and regular . Rate (approx.): 85; Axis: normal; MI interval: normal; QRS interval: prolonged; ST/T wave: normal.    No acute ischemic changes. Records Reviewed: Nursing Notes and Old Medical Records    Provider Notes (Medical Decision Making):   DDx: Medical clearance, COPD exacerbation, ACS, Opiate detox    ED Course:   Initial assessment performed. The patients presenting problems have been discussed, and they are in agreement with the care plan formulated and outlined with them. I have encouraged them to ask questions as they arise throughout their visit. Pt medically cleared. [address] with psych. VB Psych states psychiatrist would like to review case in the morning. 0400  Transfer of care to attending, Dr. Reid Gonzalez. PLAN:  1. Discharge Medication List as of 11/22/2019  1:22 PM      CONTINUE these medications which have NOT CHANGED    Details   OXYGEN-AIR DELIVERY SYSTEMS 3 L by IntraNASal route as needed for Other (sob). , Historical Med      ibuprofen (MOTRIN) 800 mg tablet Take 1 Tab by mouth three (3) times daily as needed for Pain., Normal, Disp-40 Tab, R-0      docusate sodium (COLACE) 100 mg capsule Take 1 Cap by mouth two (2) times a day., Normal, Disp-120 Cap, R-0      ondansetron (ZOFRAN ODT) 4 mg disintegrating tablet Take 1 Tab by mouth every eight (8) hours as needed for Nausea. Indications: prevent nausea and vomiting after surgery, Normal, Disp-20 Tab, R-0      hydroCHLOROthiazide (HYDRODIURIL) 50 mg tablet Take 25 mg by mouth daily. , Historical Med      QUEtiapine (SEROQUEL) 300 mg tablet Take 300 mg by mouth nightly. Indications: States she lost her daughter, Historical Med      albuterol sulfate 90 mcg/actuation aepb Take 2 Puffs by inhalation every four (4) hours as needed for Cough or Other (Wheezing). , Print, Disp-1 Inhaler, R-0      fluticasone furoate-vilanterol (BREO ELLIPTA) 200-25 mcg/dose inhaler Take 1 Puff by inhalation daily. , Historical Med      lisinopril (PRINIVIL, ZESTRIL) 20 mg tablet Take 20 mg by mouth daily. , Historical Med      arformoterol (BROVANA) 15 mcg/2 mL nebu neb solution 2 mL by Nebulization route two (2) times a day., Print, Disp-60 Vial, R-0      budesonide (PULMICORT) 0.5 mg/2 mL nbsp 2 mL by Nebulization route two (2) times a day., Print, Disp-60 Each, R-0      tiotropium (SPIRIVA) 18 mcg inhalation capsule Take 1 Cap by inhalation daily. , Print, Disp-30 Cap, R-0      atorvastatin (LIPITOR) 20 mg tablet Take 1 Tab by mouth nightly. , Print, Disp-30 Tab, R-0      cloNIDine HCl (CATAPRES) 0.2 mg tablet Take 1 Tab by mouth two (2) times a day., Print, Disp-60 Tab, R-0      escitalopram oxalate (LEXAPRO) 10 mg tablet Take 1 Tab by mouth every evening., Print, Disp-30 Tab, R-0      metoprolol tartrate (LOPRESSOR) 25 mg tablet Take 0.5 Tabs by mouth every twelve (12) hours. , Print, Disp-30 Tab, R-0      roflumilast (DALIRESP) 500 mcg tab tablet Take 1 Tab by mouth daily. , Print, Disp-30 Tab, R-0      budesonide-formoterol (SYMBICORT) 160-4.5 mcg/actuation HFAA Take 2 Puffs by inhalation two (2) times a day., Normal, Disp-1 Inhaler, R-1      famotidine (PEPCID) 20 mg tablet Take 1 Tab by mouth daily. , Print, Disp-30 Tab, R-1      montelukast (SINGULAIR) 10 mg tablet Take 1 Tab by mouth nightly. , Print, Disp-30 Tab, R-1      ciprofloxacin HCl (CIPRO) 250 mg tablet Take 2 Tabs by mouth two (2) times a day. Indications: Bacterial Urinary Tract Infection, Normal, Disp-15 Tab, R-0      amLODIPine (NORVASC) 10 mg tablet Take 1 Tab by mouth daily. , Print, Disp-30 Tab, R-0      OTHER Incentive spirometry- use as directed, Print, Disp-1 Each, R-0      naloxone (NARCAN) 4 mg/actuation nasal spray Use 1 spray intranasally, then discard. Repeat with new spray every 2 min as needed for opioid overdose symptoms, alternating nostrils. , Print, Disp-1 Each, R-0           2. Follow-up Information     Follow up With Specialties Details Why Contact Info    [facility] EMERGENCY DEPT Emergency Medicine  As needed, If symptoms worsen [address] [place] [address]  [address] [facility], [address] [facility], NP Nurse Practitioner In 3 days  [address] [facility] SPECIALTY [facility] Psychiatry In 1 day  [address] [facility] [address]    Elizabeth Calderon MD Dental General Practice In 1 day  [phone]  [address] [place] [address] ([facility])  [phone]          Return to ED if worse     Diagnosis     Clinical Impression:   1. Opiate use    2. Suicidal ideation        Attestations:    DARCIE Kaye    Please note that this dictation was completed with [employer], the [employer] voice recognition software. Quite often unanticipated grammatical, syntax, homophones, and other interpretive errors are inadvertently transcribed by the computer software. Please disregard these errors. Please excuse any errors that have escaped final proofreading. Thank you.

## 2019-11-22 VITALS
SYSTOLIC BLOOD PRESSURE: 144 MMHG | TEMPERATURE: 98 F | RESPIRATION RATE: 13 BRPM | WEIGHT: 179 LBS | DIASTOLIC BLOOD PRESSURE: 86 MMHG | OXYGEN SATURATION: 97 % | HEART RATE: 86 BPM | BODY MASS INDEX: 36.15 KG/M2

## 2019-11-22 LAB
ATRIAL RATE: 85 BPM
CALCULATED P AXIS, ECG09: 65 DEGREES
CALCULATED R AXIS, ECG10: 50 DEGREES
CALCULATED T AXIS, ECG11: 39 DEGREES
DIAGNOSIS, 93000: NORMAL
P-R INTERVAL, ECG05: 140 MS
Q-T INTERVAL, ECG07: 416 MS
QRS DURATION, ECG06: 76 MS
QTC CALCULATION (BEZET), ECG08: 495 MS
VENTRICULAR RATE, ECG03: 85 BPM

## 2019-11-22 PROCEDURE — 74011250637 HC RX REV CODE- 250/637: Performed by: PHYSICIAN ASSISTANT

## 2019-11-22 PROCEDURE — 74011250637 HC RX REV CODE- 250/637: Performed by: EMERGENCY MEDICINE

## 2019-11-22 RX ORDER — IBUPROFEN 400 MG/1
400 TABLET ORAL ONCE
Status: COMPLETED | OUTPATIENT
Start: 2019-11-22 | End: 2019-11-22

## 2019-11-22 RX ORDER — DEXTROSE 40 %
1 GEL (GRAM) ORAL AS NEEDED
Status: DISCONTINUED | OUTPATIENT
Start: 2019-11-22 | End: 2019-11-22

## 2019-11-22 RX ORDER — ACETAMINOPHEN 500 MG
1000 TABLET ORAL ONCE
Status: COMPLETED | OUTPATIENT
Start: 2019-11-22 | End: 2019-11-22

## 2019-11-22 RX ORDER — ACETAMINOPHEN 325 MG/1
650 TABLET ORAL
Status: DISCONTINUED | OUTPATIENT
Start: 2019-11-22 | End: 2019-11-22 | Stop reason: HOSPADM

## 2019-11-22 RX ORDER — ONDANSETRON 4 MG/1
4 TABLET, ORALLY DISINTEGRATING ORAL
Status: COMPLETED | OUTPATIENT
Start: 2019-11-22 | End: 2019-11-22

## 2019-11-22 RX ORDER — CLONIDINE HYDROCHLORIDE 0.1 MG/1
0.2 TABLET ORAL
Status: COMPLETED | OUTPATIENT
Start: 2019-11-22 | End: 2019-11-22

## 2019-11-22 RX ORDER — QUETIAPINE FUMARATE 25 MG/1
50 TABLET, FILM COATED ORAL
Status: COMPLETED | OUTPATIENT
Start: 2019-11-22 | End: 2019-11-22

## 2019-11-22 RX ADMIN — CLONIDINE HYDROCHLORIDE 0.2 MG: 0.1 TABLET ORAL at 10:48

## 2019-11-22 RX ADMIN — ONDANSETRON 4 MG: 4 TABLET, ORALLY DISINTEGRATING ORAL at 10:48

## 2019-11-22 RX ADMIN — IBUPROFEN 400 MG: 400 TABLET, FILM COATED ORAL at 13:47

## 2019-11-22 RX ADMIN — QUETIAPINE FUMARATE 50 MG: 25 TABLET ORAL at 10:48

## 2019-11-22 RX ADMIN — Medication: at 02:01

## 2019-11-22 RX ADMIN — ACETAMINOPHEN 1000 MG: 500 TABLET ORAL at 10:48

## 2019-11-22 NOTE — ED NOTES
Patient woke up requesting to speak to the nurse. Patient asked for coffee, the sitter got it for her. No new complaints. Sitter at bedside.

## 2019-11-22 NOTE — ED NOTES
Bedside shift change report given to Leeanne (oncoming nurse) by Trey WAGGONER (offgoing nurse). Report included the following information SBAR, Kardex, ED Summary, [facility] and Recent Results.

## 2019-11-22 NOTE — ED NOTES
Bedside and Verbal shift change report given to [facility] (oncoming nurse) by Lewis Burnette, RN (offgoing nurse). Report included the following information SBAR, Kardex and ED Summary.

## 2019-11-22 NOTE — ED NOTES
4:00 AM :Pt care assumed from [address] ED provider. Pt complaint(s), current treatment plan, progression and available diagnostic results have been discussed thoroughly. Rounding occurred: yes  Intended Disposition: Transfer   Pending diagnostic reports and/or labs (please list): Psych placement      7:00 AM Pt care transferred to Dr. Bernadette Andujar  ,ED provider. History of patient complaint(s), available diagnostic reports and current treatment plan has been discussed thoroughly. Bedside rounding on patient occured : yes . Intended disposition of patient : placement for detox/mental health  Pending diagnostics reports and/or labs (please list):  placement      Scribe Attestation     Brian Raygoza acting as a scribe for and in the presence of Asim Navarro DO      November 22, 2019 at 4:29 AM       Provider Attestation:      I personally performed the services described in the documentation, reviewed the documentation, as recorded by the scribe in my presence, and it accurately and completely records my words and actions.  November 22, 2019 at 4:29 AM - Asim Navarro DO

## 2019-11-22 NOTE — BSMART NOTE
Spoke with Fariba Quarles from [facility], information faxed, patient to be reviewed for admission. Return call, no accepting physician tonight. Patient to be reviewed in am with treating physician.

## 2019-11-22 NOTE — ED NOTES
Per Crisis, pts chart will be evaluated by [facility] in the morning. Pt resting on stretcher with eyes closed, observed rise and fall of chest.  Pt easily aroused by name calling. NAD noted, no new complaints voiced at this time. Will continue to monitor.

## 2019-11-22 NOTE — DISCHARGE INSTRUCTIONS
Patient Education        Learning About Heroin Use and Withdrawal  What is heroin use? Heroin is an illegal, highly addictive drug. It's an opioid, like some types of medicines that doctors prescribe to treat pain. Examples of prescribed opioids include hydrocodone, oxycodone, fentanyl, and morphine. But unlike a prescribed opioid, heroin does not have rules for legal use. Heroin has no . The strength of each dose is not known. It is often mixed (cut) with other drugs or things like powdered milk. It may also be cut with poisons, such as strychnine. Often, heroin use starts with the misuse of a prescribed opioid. A person may then switch to heroin because of its lower cost, in spite of the greater danger of using it. A person who uses heroin often will start needing bigger and bigger doses of the drug to get the same effect. This is called tolerance. If a person uses heroin regularly, the body gets used to it. This is called physical dependence. This leads to withdrawal symptoms within a few hours if the person stops using it or uses less. A person who uses heroin daily can become addicted to it within a few weeks. Heroin addiction means a person has a strong need to keep using heroin even though it causes harm to themselves or to others. Heroin addiction is also called opioid use disorder. Heroin overdose and rescue treatment  Taking too much heroin can be dangerous. An overdose may cause trouble breathing, low blood pressure, a low heart rate, or a coma. It's hard to know how much heroin can cause an overdose. A lot depends on how strong the drug is and what it's cut with. And if a person starts using less heroin, he or she may lose tolerance to it. The person then may be more sensitive to it and may overdose when using less heroin. If you are worried that you or someone you know will take too much heroin, talk to your doctor about a naloxone rescue kit.  Naloxone helps reverse the effects of heroin. If you take too much heroin, a kit can save your life. What happens during withdrawal?  If a person who is physically dependent on heroin stops taking it or uses less, he or she will have withdrawal symptoms within a few hours. Symptoms can include anxiety, nausea, sweating, and chills. The person may also have diarrhea, stomach cramps, and muscle aches. These symptoms may be mild or severe. They may feel like the flu (influenza). Withdrawal can last from weeks to months. A person who has stopped using heroin will feel very ill for several days. A doctor may prescribe medicine to help relieve the symptoms. Cravings for heroin usually go away over the next few months. But they may suddenly come back months later. How can a person get help? If a person decides to stop using heroin, it's safest to go through withdrawal under a doctor's care. Treatment for opioid use disorder may include medicine, group therapy, counseling, and education. The person may need to stay in a hospital or treatment center. Sometimes medicines are used to help the person take less heroin over time and then quit. Medicines can help control cravings and ease withdrawal symptoms. Without medicine, people who have opioid use disorder usually go back to using heroin. Treatment focuses on more than heroin use. It helps the person understand why he or she started using heroin in the first place. It also helps the person cope with the emotions that may come with trying to stop using heroin. Counseling can also help the person's friends and family. It can provide support and teach those people how to give the help that the person needs. Follow-up care is a key part of your treatment and safety. Be sure to make and go to all appointments, and call your doctor if you are having problems. It's also a good idea to know your test results and keep a list of the medicines you take. Where can you learn more?   Go to http://radhames.info/. Enter P277 in the search box to learn more about \"Learning About Heroin Use and Withdrawal.\"  Current as of: February 5, 2019  Content Version: 12.2  © 8591-9077 [employer]. Care instructions adapted under license by [employer] (which disclaims liability or warranty for this information). If you have questions about a medical condition or this instruction, always ask your healthcare professional. [place] [address] any warranty or liability for your use of this information.

## 2019-11-22 NOTE — ED NOTES
Patient presents to ED for medical clearance for [facility] [address] The pt stated she already has a bed, but needs medical clearance  Patient is A&Ox4, side rails upx2, call bell w/in reach, and aware of plan of care. The patient is in NAD.

## 2019-11-22 NOTE — ED NOTES
Patient continues to get out of bed to ask for Orajel and tylenol. \" My tooth hurt really bad and nobody is trying to listen to me, I cracked my tooth in half and I need some Orajel. \" Reoriented patient back to the room, see MAR.

## 2019-11-22 NOTE — ED NOTES
Patient states she spoke to someone from [facility] and is willing to go there for treatment.

## 2019-11-22 NOTE — ED NOTES
Patient disconnected herself from the monitor complaining of tooth ache. Patient states she needs to speak to the doctor immediately for her toothache because it is hurting really bad. Redirected the patient back to the room.  Will continue to monitor

## 2019-11-22 NOTE — BSMART NOTE
Called [facility] [phone] to check on status. They have the chart but needs reviewed by their Physician in regards to patients medical conditions. State once review by the Doctor someone will call crisis with outcome.

## 2019-11-22 NOTE — BSMART NOTE
Comprehensive Assessment Form Part 1 Section I - Disposition The plan is find the patient an appropriate bed in the community. The Medical Doctor is in agreement with disposition. Section II - Integrated Summary The information is given by the patient. The Chief Complaint is detox. The Precipitant Factors are substance abuse. Previous Hospitalizations: multiple, last detox 5-6 months ago Patient 53 y.o. female PMHx significant for COPD, asthma, htn, CHF, hypercholesterolemia, presents ambulatory to the ED for detox. Patient reports last heroin use was 1100 this morning. Patient reports coming to the hospital for medical clearance admission to [facility] for heroin detox. Patient reports using \"as much dope as I can\" which is 20 caps or more daily. Patient reports prior detox and substance abuse treatment at [facility], [facility], and [place]. Last detox 5-6 months ago. Outpatient treatment at [facility] with Dr Elaine Muro. Patient reports medication compliance. UDS positive for opiates. Patient reports feeling depressed today which prompted her to seek treatment for her substance abuse. Patient denies SI, HI, and aud/vis hallucinations. The patient's appearance shows no evidence of impairment. Patient notes to have suitcases in the room with her. The patient's behavior shows poor impulse control. The patient is oriented to time, place, person and situation. The patient's speech shows no evidence of impairment. The patient's mood  is anxious. The patient's thought content  demonstrates no evidence of impairment. The thought process shows no evidence of impairment. The patient's insight shows no evidence of impairment. The patient's judgement shows no evidence of impairment. Patient is voluntary for treatment. Roxy Fournier

## 2019-11-22 NOTE — ED NOTES
[facility] called and said patient is not a fit for them and will not be taking her. . Informed them that patient left after being discharged.

## 2019-11-22 NOTE — BSMART NOTE
Marguerite of [address] [facility] [address] called to inform that they are unable to accept patient for treatment related to her need for oxygen. Met with patient in the ER to inform her of the above. She states she does not use oxygen constantly, only when short of breath. Patient states she would like to get treatment at [facility]. Marquis Ford of [employer] contacted a CSU to question the ability to take patients with oxygen. Per Marquis Ford they do not. Met again with patient to inform her of the above information. Patient states she has NOT used home oxygen in at least 5-6 years. States her past treatments in [facility] and [facility] she did not use any oxygen. . Patient very irritated at this time regarding her inpatient treatments being declined because of documentation in her chart about using oxygen she does not even use. Spoke to Luis at [facility]. Chart faxed for review for admission for opiate detox.

## 2019-11-22 NOTE — BSMART NOTE
Suad Peck of [employer] called asking to speak to MsTenzin Mary Trimble ( [phone]). Patient informed of this and called Suad Peck. Suad Peck called crisis reporting that the patient stated to her she was in too much pain and didn't have time to talk to her. Called the ER and spoke to charge Nurse Parminder Andujar regarding the above. Patient can be discharge home from the ER.

## 2019-11-22 NOTE — BSMART NOTE
Received a call from the ER charge nurse Sridevi Valdivia that patient is asking to be discharge. I had met with patient several times already in the ER trying to get her admitted to a facility for opiate detox. Patient is denying any suicidal ideations. Patient is not psychotic. Patient is not meeting criteria to seek a TDO evaluation

## 2019-11-25 ENCOUNTER — HOSPITAL ENCOUNTER (EMERGENCY)
Age: 54
Discharge: HOME | End: 2019-11-26
Attending: EMERGENCY MEDICINE
Payer: MEDICAID

## 2019-11-25 ENCOUNTER — APPOINTMENT (OUTPATIENT)
Dept: GENERAL RADIOLOGY | Age: 54
End: 2019-11-25
Attending: PHYSICIAN ASSISTANT
Payer: MEDICAID

## 2019-11-25 DIAGNOSIS — F11.10 HEROIN ABUSE (HCC): Primary | ICD-10-CM

## 2019-11-25 DIAGNOSIS — F32.A DEPRESSION, UNSPECIFIED DEPRESSION TYPE: ICD-10-CM

## 2019-11-25 LAB
ALBUMIN SERPL-MCNC: 3.7 G/DL (ref 3.4–5)
ALBUMIN/GLOB SERPL: 0.9 {RATIO} (ref 0.8–1.7)
ALP SERPL-CCNC: 97 U/L (ref 45–117)
ALT SERPL-CCNC: 15 U/L (ref 13–56)
AMPHET UR QL SCN: NEGATIVE
ANION GAP SERPL CALC-SCNC: 4 MMOL/L (ref 3–18)
APPEARANCE UR: CLEAR
AST SERPL-CCNC: 12 U/L (ref 10–38)
BACTERIA URNS QL MICRO: ABNORMAL /HPF
BARBITURATES UR QL SCN: NEGATIVE
BASOPHILS # BLD: 0 K/UL (ref 0–0.1)
BASOPHILS NFR BLD: 0 % (ref 0–2)
BENZODIAZ UR QL: NEGATIVE
BILIRUB SERPL-MCNC: 0.3 MG/DL (ref 0.2–1)
BILIRUB UR QL: NEGATIVE
BUN SERPL-MCNC: 17 MG/DL (ref 7–18)
BUN/CREAT SERPL: 17 (ref 12–20)
CALCIUM SERPL-MCNC: 9.1 MG/DL (ref 8.5–10.1)
CANNABINOIDS UR QL SCN: NEGATIVE
CHLORIDE SERPL-SCNC: 111 MMOL/L (ref 100–111)
CO2 SERPL-SCNC: 29 MMOL/L (ref 21–32)
COCAINE UR QL SCN: NEGATIVE
COLOR UR: YELLOW
CREAT SERPL-MCNC: 0.99 MG/DL (ref 0.6–1.3)
DIFFERENTIAL METHOD BLD: NORMAL
EOSINOPHIL # BLD: 0.2 K/UL (ref 0–0.4)
EOSINOPHIL NFR BLD: 2 % (ref 0–5)
EPITH CASTS URNS QL MICRO: ABNORMAL /LPF (ref 0–5)
ERYTHROCYTE [DISTWIDTH] IN BLOOD BY AUTOMATED COUNT: 13.3 % (ref 11.6–14.5)
ETHANOL SERPL-MCNC: <3 MG/DL (ref 0–3)
GLOBULIN SER CALC-MCNC: 4 G/DL (ref 2–4)
GLUCOSE SERPL-MCNC: 95 MG/DL (ref 74–99)
GLUCOSE UR STRIP.AUTO-MCNC: NEGATIVE MG/DL
HCT VFR BLD AUTO: 40.8 % (ref 35–45)
HDSCOM,HDSCOM: ABNORMAL
HGB BLD-MCNC: 13 G/DL (ref 12–16)
HGB UR QL STRIP: ABNORMAL
KETONES UR QL STRIP.AUTO: NEGATIVE MG/DL
LEUKOCYTE ESTERASE UR QL STRIP.AUTO: NEGATIVE
LIPASE SERPL-CCNC: 125 U/L (ref 73–393)
LYMPHOCYTES # BLD: 2.8 K/UL (ref 0.9–3.6)
LYMPHOCYTES NFR BLD: 33 % (ref 21–52)
MAGNESIUM SERPL-MCNC: 2.2 MG/DL (ref 1.6–2.6)
MCH RBC QN AUTO: 27.7 PG (ref 24–34)
MCHC RBC AUTO-ENTMCNC: 31.9 G/DL (ref 31–37)
MCV RBC AUTO: 87 FL (ref 74–97)
METHADONE UR QL: NEGATIVE
MONOCYTES # BLD: 0.4 K/UL (ref 0.05–1.2)
MONOCYTES NFR BLD: 5 % (ref 3–10)
NEUTS SEG # BLD: 5.1 K/UL (ref 1.8–8)
NEUTS SEG NFR BLD: 60 % (ref 40–73)
NITRITE UR QL STRIP.AUTO: NEGATIVE
OPIATES UR QL: POSITIVE
PCP UR QL: NEGATIVE
PH UR STRIP: 5 [PH] (ref 5–8)
PLATELET # BLD AUTO: 269 K/UL (ref 135–420)
PMV BLD AUTO: 11.3 FL (ref 9.2–11.8)
POTASSIUM SERPL-SCNC: 4.1 MMOL/L (ref 3.5–5.5)
PROT SERPL-MCNC: 7.7 G/DL (ref 6.4–8.2)
PROT UR STRIP-MCNC: 30 MG/DL
RBC # BLD AUTO: 4.69 M/UL (ref 4.2–5.3)
RBC #/AREA URNS HPF: ABNORMAL /HPF (ref 0–5)
SODIUM SERPL-SCNC: 144 MMOL/L (ref 136–145)
SP GR UR REFRACTOMETRY: 1.03 (ref 1–1.03)
UROBILINOGEN UR QL STRIP.AUTO: 0.2 EU/DL (ref 0.2–1)
WBC # BLD AUTO: 8.6 K/UL (ref 4.6–13.2)
WBC URNS QL MICRO: ABNORMAL /HPF (ref 0–4)

## 2019-11-25 PROCEDURE — 80307 DRUG TEST PRSMV CHEM ANLYZR: CPT

## 2019-11-25 PROCEDURE — 85025 COMPLETE CBC W/AUTO DIFF WBC: CPT

## 2019-11-25 PROCEDURE — 83735 ASSAY OF MAGNESIUM: CPT

## 2019-11-25 PROCEDURE — 74011000250 HC RX REV CODE- 250: Performed by: PHYSICIAN ASSISTANT

## 2019-11-25 PROCEDURE — 80053 COMPREHEN METABOLIC PANEL: CPT

## 2019-11-25 PROCEDURE — 99284 EMERGENCY DEPT VISIT MOD MDM: CPT

## 2019-11-25 PROCEDURE — 94640 AIRWAY INHALATION TREATMENT: CPT

## 2019-11-25 PROCEDURE — 83690 ASSAY OF LIPASE: CPT

## 2019-11-25 PROCEDURE — 74011250637 HC RX REV CODE- 250/637: Performed by: EMERGENCY MEDICINE

## 2019-11-25 PROCEDURE — 71046 X-RAY EXAM CHEST 2 VIEWS: CPT

## 2019-11-25 PROCEDURE — 81001 URINALYSIS AUTO W/SCOPE: CPT

## 2019-11-25 PROCEDURE — 74011250636 HC RX REV CODE- 250/636: Performed by: EMERGENCY MEDICINE

## 2019-11-25 RX ORDER — BUPRENORPHINE HYDROCHLORIDE AND NALOXONE HYDROCHLORIDE DIHYDRATE 8; 2 MG/1; MG/1
1 TABLET SUBLINGUAL
Status: COMPLETED | OUTPATIENT
Start: 2019-11-25 | End: 2019-11-25

## 2019-11-25 RX ORDER — CLONIDINE HYDROCHLORIDE 0.1 MG/1
0.2 TABLET ORAL
Status: COMPLETED | OUTPATIENT
Start: 2019-11-25 | End: 2019-11-25

## 2019-11-25 RX ORDER — IPRATROPIUM BROMIDE AND ALBUTEROL SULFATE 2.5; .5 MG/3ML; MG/3ML
3 SOLUTION RESPIRATORY (INHALATION)
Status: COMPLETED | OUTPATIENT
Start: 2019-11-25 | End: 2019-11-25

## 2019-11-25 RX ADMIN — IPRATROPIUM BROMIDE AND ALBUTEROL SULFATE 3 ML: .5; 3 SOLUTION RESPIRATORY (INHALATION) at 16:49

## 2019-11-25 RX ADMIN — CLONIDINE HYDROCHLORIDE 0.2 MG: 0.1 TABLET ORAL at 19:33

## 2019-11-25 RX ADMIN — BUPRENORPHINE HYDROCHLORIDE AND NALOXONE HYDROCHLORIDE DIHYDRATE 1 TABLET: 8; 2 TABLET SUBLINGUAL at 19:33

## 2019-11-25 NOTE — ED TRIAGE NOTES
Patient arrived via triage stating \"im sick from [facility]. The last time I used was 2 days ago. \" Patient advised that she has diarrhea, vomiting, abdominal pain, and back pain.

## 2019-11-25 NOTE — ED NOTES
I performed a brief history of the patient here in triage and I have determined that pt will need further treatment and evaluation from the main side ER physician. I have placed initial orders based on the history to help in expediting patients care. Patient states she is here to go through detox and wants to get help. States she uses heroin and her last use was 2 days ago.      Visit Vitals  BP (!) 206/100 (BP 1 Location: Left arm, BP Patient Position: At rest;Sitting)   Pulse 74   Temp 97.5 °F (36.4 °C)   Resp 24   Ht 4' 11\" (1.499 m)   Wt 80.7 kg (178 lb)   SpO2 100%   BMI 35.95 kg/m²       Darcy Velázquez PA-C  4:08 PM

## 2019-11-25 NOTE — ED PROVIDER NOTES
EMERGENCY DEPARTMENT HISTORY AND PHYSICAL EXAM    6:23 PM      Date: 11/25/2019  Patient Name: Nivia Robledo    History of Presenting Illness     Chief Complaint   Patient presents with   [address]         History Provided By: Patient  Location/Duration/Severity/Modifying factors   Patient is a 76-year-old female with a history of COPD and polysubstance abuse that presents emergency department requesting evaluation for her increasing depression as well as polysubstance abuse. Patient is using approximate 15 caps of heroin daily with last use being yesterday. Patient's been having diarrhea, vomiting, myalgias, and fatigue. Patient is on home oxygen as needed and is having some wheezing today that responded well to a reading treatment. Patient denies any chest pain or fevers. The patient was seen here several days ago and was ultimately sent home and thought she needed to be evaluated for inpatient care. Patient notes she is having some suicidal thoughts without a plan. Patient know she is had overdoses of opiates in the past with 3 in the last 2 years. Patient presented here with her care worker. Patient notes she is now on Suboxone however she would be interested in it but there are no local clinics. Patient denies any other aggravating or alleviating factors. PCP: Qing Krause NP    Current Outpatient Medications   Medication Sig Dispense Refill    OXYGEN-AIR DELIVERY SYSTEMS 3 L by IntraNASal route as needed for Other (sob).  ciprofloxacin HCl (CIPRO) 250 mg tablet Take 2 Tabs by mouth two (2) times a day. Indications: Bacterial Urinary Tract Infection 15 Tab 0    ibuprofen (MOTRIN) 800 mg tablet Take 1 Tab by mouth three (3) times daily as needed for Pain. 40 Tab 0    docusate sodium (COLACE) 100 mg capsule Take 1 Cap by mouth two (2) times a day.  120 Cap 0    ondansetron (ZOFRAN ODT) 4 mg disintegrating tablet Take 1 Tab by mouth every eight (8) hours as needed for Nausea. Indications: prevent nausea and vomiting after surgery 20 Tab 0    hydroCHLOROthiazide (HYDRODIURIL) 50 mg tablet Take 25 mg by mouth daily.  QUEtiapine (SEROQUEL) 300 mg tablet Take 300 mg by mouth nightly. Indications: States she lost her daughter      albuterol sulfate 90 mcg/actuation aepb Take 2 Puffs by inhalation every four (4) hours as needed for Cough or Other (Wheezing). 1 Inhaler 0    fluticasone furoate-vilanterol (BREO ELLIPTA) 200-25 mcg/dose inhaler Take 1 Puff by inhalation daily.  lisinopril (PRINIVIL, ZESTRIL) 20 mg tablet Take 20 mg by mouth daily.  amLODIPine (NORVASC) 10 mg tablet Take 1 Tab by mouth daily. 30 Tab 0    arformoterol (BROVANA) 15 mcg/2 mL nebu neb solution 2 mL by Nebulization route two (2) times a day. 60 Vial 0    budesonide (PULMICORT) 0.5 mg/2 mL nbsp 2 mL by Nebulization route two (2) times a day. 60 Each 0    tiotropium (SPIRIVA) 18 mcg inhalation capsule Take 1 Cap by inhalation daily. 30 Cap 0    atorvastatin (LIPITOR) 20 mg tablet Take 1 Tab by mouth nightly. 30 Tab 0    cloNIDine HCl (CATAPRES) 0.2 mg tablet Take 1 Tab by mouth two (2) times a day. 60 Tab 0    escitalopram oxalate (LEXAPRO) 10 mg tablet Take 1 Tab by mouth every evening. 30 Tab 0    metoprolol tartrate (LOPRESSOR) 25 mg tablet Take 0.5 Tabs by mouth every twelve (12) hours. 30 Tab 0    roflumilast (DALIRESP) 500 mcg tab tablet Take 1 Tab by mouth daily. 30 Tab 0    OTHER Incentive spirometry- use as directed 1 Each 0    naloxone (NARCAN) 4 mg/actuation nasal spray Use 1 spray intranasally, then discard. Repeat with new spray every 2 min as needed for opioid overdose symptoms, alternating nostrils. 1 Each 0    budesonide-formoterol (SYMBICORT) 160-4.5 mcg/actuation HFAA Take 2 Puffs by inhalation two (2) times a day. 1 Inhaler 1    famotidine (PEPCID) 20 mg tablet Take 1 Tab by mouth daily. 30 Tab 1    montelukast (SINGULAIR) 10 mg tablet Take 1 Tab by mouth nightly. 30 Tab 1       Past History     Past Medical History:  Past Medical History:   Diagnosis Date    Asthma     CHF (congestive heart failure) (HCC)     Chronic obstructive pulmonary disease (HCC)     Dependence on supplemental oxygen     Endocrine disease     thyroid issues    Gastrointestinal disorder     \"blockage in my stomach\"    Hypercholesterolemia     Hypertension        Past Surgical History:  Past Surgical History:   Procedure Laterality Date    HX GI      Exploratory laparotomy with lysis of adhesions and primary repair of incarcerated umbilical hernia       Family History:  No family history on file. Social History:  Social History     Tobacco Use    Smoking status: Current Every Day Smoker     Packs/day: 0.25    Smokeless tobacco: Former User   Substance Use Topics    Alcohol use: No     Comment: socially    Drug use: Not Currently     Types: Heroin       Allergies:  No Known Allergies      Review of Systems       Review of Systems   Constitutional: Negative for activity change, fatigue and fever. HENT: Negative for congestion and rhinorrhea. Eyes: Negative for visual disturbance. Respiratory: Positive for wheezing. Negative for shortness of breath. Cardiovascular: Negative for chest pain and palpitations. Gastrointestinal: Positive for diarrhea, nausea and vomiting. Negative for abdominal pain. Genitourinary: Negative for dysuria and hematuria. Musculoskeletal: Positive for myalgias. Negative for back pain. Skin: Negative for rash. Neurological: Negative for dizziness, weakness and light-headedness. Psychiatric/Behavioral: Positive for suicidal ideas. The patient is nervous/anxious. All other systems reviewed and are negative.         Physical Exam     Visit Vitals  /80   Pulse 83   Temp 97.5 °F (36.4 °C)   Resp 24   Ht 4' 11\" (1.499 m)   Wt 80.7 kg (178 lb)   LMP 04/14/2009   SpO2 100%   BMI 35.95 kg/m²         Physical Exam  Vitals signs and nursing note reviewed. Constitutional:       General: She is not in acute distress. Appearance: She is well-developed. HENT:      Head: Normocephalic and atraumatic. Right Ear: External ear normal.      Left Ear: External ear normal.      Nose: Nose normal.   Eyes:      General: No scleral icterus. Conjunctiva/sclera: Conjunctivae normal.      Pupils: Pupils are equal, round, and reactive to light. Neck:      Musculoskeletal: Normal range of motion and neck supple. Thyroid: No thyromegaly. Vascular: No JVD. Trachea: No tracheal deviation. Cardiovascular:      Rate and Rhythm: Normal rate and regular rhythm. Heart sounds: Normal heart sounds. No murmur. No friction rub. No gallop. Pulmonary:      Effort: Pulmonary effort is normal.      Breath sounds: Wheezing present. Chest:      Chest wall: No tenderness. Abdominal:      General: Bowel sounds are normal. There is no distension. Palpations: Abdomen is soft. Tenderness: There is no tenderness. There is no guarding or rebound. Comments: Increased bowel sounds    Musculoskeletal: Normal range of motion. General: No tenderness. Lymphadenopathy:      Cervical: No cervical adenopathy. Skin:     General: Skin is warm and dry. Neurological:      Mental Status: She is alert and oriented to person, place, and time. Cranial Nerves: No cranial nerve deficit. Coordination: Coordination normal.      Comments: No sensory loss, Gait normal, Motor 5/5   Psychiatric:         Behavior: Behavior normal.         Thought Content:  Thought content normal.         Judgment: Judgment normal.           Diagnostic Study Results     Labs -  Recent Results (from the past 12 hour(s))   URINALYSIS W/ RFLX MICROSCOPIC    Collection Time: 11/25/19  4:08 PM   Result Value Ref Range    Color YELLOW      Appearance CLEAR      Specific gravity 1.027 1.005 - 1.030      pH (UA) 5.0 5.0 - 8.0      Protein 30 (A) NEG mg/dL    Glucose NEGATIVE  NEG mg/dL    Ketone NEGATIVE  NEG mg/dL    Bilirubin NEGATIVE  NEG      Blood TRACE (A) NEG      Urobilinogen 0.2 0.2 - 1.0 EU/dL    Nitrites NEGATIVE  NEG      Leukocyte Esterase NEGATIVE  NEG     DRUG SCREEN, URINE    Collection Time: 11/25/19  4:08 PM   Result Value Ref Range    BENZODIAZEPINES NEGATIVE  NEG      BARBITURATES NEGATIVE  NEG      THC (TH-CANNABINOL) NEGATIVE  NEG      OPIATES POSITIVE (A) NEG      PCP(PHENCYCLIDINE) NEGATIVE  NEG      COCAINE NEGATIVE  NEG      AMPHETAMINES NEGATIVE  NEG      METHADONE NEGATIVE  NEG      HDSCOM (NOTE)    URINE MICROSCOPIC ONLY    Collection Time: 11/25/19  4:08 PM   Result Value Ref Range    WBC 1 to 4 0 - 4 /hpf    RBC 1 to 4 0 - 5 /hpf    Epithelial cells FEW 0 - 5 /lpf    Bacteria FEW (A) NEG /hpf   CBC WITH AUTOMATED DIFF    Collection Time: 11/25/19  4:35 PM   Result Value Ref Range    WBC 8.6 4.6 - 13.2 K/uL    RBC 4.69 4.20 - 5.30 M/uL    HGB 13.0 12.0 - 16.0 g/dL    HCT 40.8 35.0 - 45.0 %    MCV 87.0 74.0 - 97.0 FL    MCH 27.7 24.0 - 34.0 PG    MCHC 31.9 31.0 - 37.0 g/dL    RDW 13.3 11.6 - 14.5 %    PLATELET 178 179 - 090 K/uL    MPV 11.3 9.2 - 11.8 FL    NEUTROPHILS 60 40 - 73 %    LYMPHOCYTES 33 21 - 52 %    MONOCYTES 5 3 - 10 %    EOSINOPHILS 2 0 - 5 %    BASOPHILS 0 0 - 2 %    ABS. NEUTROPHILS 5.1 1.8 - 8.0 K/UL    ABS. LYMPHOCYTES 2.8 0.9 - 3.6 K/UL    ABS. MONOCYTES 0.4 0.05 - 1.2 K/UL    ABS. EOSINOPHILS 0.2 0.0 - 0.4 K/UL    ABS.  BASOPHILS 0.0 0.0 - 0.1 K/UL    DF AUTOMATED     METABOLIC PANEL, COMPREHENSIVE    Collection Time: 11/25/19  4:35 PM   Result Value Ref Range    Sodium 144 136 - 145 mmol/L    Potassium 4.1 3.5 - 5.5 mmol/L    Chloride 111 100 - 111 mmol/L    CO2 29 21 - 32 mmol/L    Anion gap 4 3.0 - 18 mmol/L    Glucose 95 74 - 99 mg/dL    BUN 17 7.0 - 18 MG/DL    Creatinine 0.99 0.6 - 1.3 MG/DL    BUN/Creatinine ratio 17 12 - 20      GFR est AA >60 >60 ml/min/1.73m2    GFR est non-AA 58 (L) >60 ml/min/1.73m2    Calcium 9.1 8.5 - 10.1 MG/DL    Bilirubin, total 0.3 0.2 - 1.0 MG/DL    ALT (SGPT) 15 13 - 56 U/L    AST (SGOT) 12 10 - 38 U/L    Alk. phosphatase 97 45 - 117 U/L    Protein, total 7.7 6.4 - 8.2 g/dL    Albumin 3.7 3.4 - 5.0 g/dL    Globulin 4.0 2.0 - 4.0 g/dL    A-G Ratio 0.9 0.8 - 1.7     LIPASE    Collection Time: 11/25/19  4:35 PM   Result Value Ref Range    Lipase 125 73 - 393 U/L   MAGNESIUM    Collection Time: 11/25/19  4:35 PM   Result Value Ref Range    Magnesium 2.2 1.6 - 2.6 mg/dL       Radiologic Studies -   XR CHEST PA LAT   Final Result   IMPRESSION: No interval change, no acute disease. Medical Decision Making   I am the first provider for this patient. I reviewed the vital signs, available nursing notes, past medical history, past surgical history, family history and social history. Vital Signs-Reviewed the patient's vital signs. Records Reviewed: Nursing Notes and Old Medical Records (Time of Review: 6:23 PM)    ED Course: Progress Notes, Reevaluation, and Consults: The patient's blood pressure has improved and she has not taken her evening dose of clonidine so we will give a dose in the emergency department. I will medically clear the patient for further evaluation. Farzana Walker DO 7:36 PM    7:36 PM : Pt care transferred to Dr. Seth Matthew  ,ED provider. History of patient complaint(s), available diagnostic reports and current treatment plan has been discussed thoroughly. Bedside rounding on patient occured : yes . Intended disposition of patient : Crisis eval  Pending diagnostics reports and/or labs (please list): Clonidine and suboxone       Provider Notes (Medical Decision Making):   MDM  Number of Diagnoses or Management Options  Diagnosis management comments: -year-old female with a history of COPD, asthma, hypertension, CHF, dyslipidemia, and polysubstance abuse who presents emergency department requesting evaluation for increasing depression and heroin abuse.   Patient has a cow score of 14 and will start Suboxone and medically clear the patient for crisis evaluation. Glorious Bibi, DO 6:42 PM        Procedures          Diagnosis     Clinical Impression:   1. Heroin abuse ([address]    2. Depression, unspecified depression type        Disposition: Pending     Follow-up Information    None          Patient's Medications   Start Taking    No medications on file   Continue Taking    ALBUTEROL SULFATE 90 MCG/ACTUATION AEPB    Take 2 Puffs by inhalation every four (4) hours as needed for Cough or Other (Wheezing). AMLODIPINE (NORVASC) 10 MG TABLET    Take 1 Tab by mouth daily. ARFORMOTEROL (BROVANA) 15 MCG/2 ML NEBU NEB SOLUTION    2 mL by Nebulization route two (2) times a day. ATORVASTATIN (LIPITOR) 20 MG TABLET    Take 1 Tab by mouth nightly. BUDESONIDE (PULMICORT) 0.5 MG/2 ML NBSP    2 mL by Nebulization route two (2) times a day. BUDESONIDE-FORMOTEROL (SYMBICORT) 160-4.5 MCG/ACTUATION HFAA    Take 2 Puffs by inhalation two (2) times a day. CIPROFLOXACIN HCL (CIPRO) 250 MG TABLET    Take 2 Tabs by mouth two (2) times a day. Indications: Bacterial Urinary Tract Infection    CLONIDINE HCL (CATAPRES) 0.2 MG TABLET    Take 1 Tab by mouth two (2) times a day. DOCUSATE SODIUM (COLACE) 100 MG CAPSULE    Take 1 Cap by mouth two (2) times a day. ESCITALOPRAM OXALATE (LEXAPRO) 10 MG TABLET    Take 1 Tab by mouth every evening. FAMOTIDINE (PEPCID) 20 MG TABLET    Take 1 Tab by mouth daily. FLUTICASONE FUROATE-VILANTEROL (BREO ELLIPTA) 200-25 MCG/DOSE INHALER    Take 1 Puff by inhalation daily. HYDROCHLOROTHIAZIDE (HYDRODIURIL) 50 MG TABLET    Take 25 mg by mouth daily. IBUPROFEN (MOTRIN) 800 MG TABLET    Take 1 Tab by mouth three (3) times daily as needed for Pain. LISINOPRIL (PRINIVIL, ZESTRIL) 20 MG TABLET    Take 20 mg by mouth daily. METOPROLOL TARTRATE (LOPRESSOR) 25 MG TABLET    Take 0.5 Tabs by mouth every twelve (12) hours.     MONTELUKAST (SINGULAIR) 10 MG TABLET    Take 1 Tab by mouth nightly. NALOXONE (NARCAN) 4 MG/ACTUATION NASAL SPRAY    Use 1 spray intranasally, then discard. Repeat with new spray every 2 min as needed for opioid overdose symptoms, alternating nostrils. ONDANSETRON (ZOFRAN ODT) 4 MG DISINTEGRATING TABLET    Take 1 Tab by mouth every eight (8) hours as needed for Nausea. Indications: prevent nausea and vomiting after surgery    OTHER    Incentive spirometry- use as directed    OXYGEN-AIR DELIVERY SYSTEMS    3 L by IntraNASal route as needed for Other (sob). QUETIAPINE (SEROQUEL) 300 MG TABLET    Take 300 mg by mouth nightly. Indications: States she lost her daughter    ROFLUMILAST (DALIRESP) 500 MCG TAB TABLET    Take 1 Tab by mouth daily. TIOTROPIUM (SPIRIVA) 18 MCG INHALATION CAPSULE    Take 1 Cap by inhalation daily. These Medications have changed    No medications on file   Stop Taking    No medications on file     Disclaimer: Sections of this note are dictated using utilizing voice recognition software. Minor typographical errors may be present. If questions arise, please do not hesitate to contact me or call our department.

## 2019-11-26 VITALS
OXYGEN SATURATION: 99 % | WEIGHT: 178 LBS | BODY MASS INDEX: 35.88 KG/M2 | DIASTOLIC BLOOD PRESSURE: 94 MMHG | HEIGHT: 59 IN | HEART RATE: 62 BPM | SYSTOLIC BLOOD PRESSURE: 171 MMHG | RESPIRATION RATE: 18 BRPM | TEMPERATURE: 97 F

## 2019-11-26 PROCEDURE — 74011000250 HC RX REV CODE- 250: Performed by: EMERGENCY MEDICINE

## 2019-11-26 PROCEDURE — 74011250637 HC RX REV CODE- 250/637: Performed by: EMERGENCY MEDICINE

## 2019-11-26 RX ORDER — ALBUTEROL SULFATE 90 UG/1
2 AEROSOL, METERED RESPIRATORY (INHALATION)
Qty: 1 INHALER | Refills: 0 | OUTPATIENT
Start: 2019-11-26 | End: 2020-01-17

## 2019-11-26 RX ORDER — IPRATROPIUM BROMIDE AND ALBUTEROL SULFATE 2.5; .5 MG/3ML; MG/3ML
SOLUTION RESPIRATORY (INHALATION)
Status: DISCONTINUED
Start: 2019-11-26 | End: 2019-11-26 | Stop reason: HOSPADM

## 2019-11-26 RX ORDER — DEXAMETHASONE SODIUM PHOSPHATE 4 MG/ML
10 INJECTION, SOLUTION INTRA-ARTICULAR; INTRALESIONAL; INTRAMUSCULAR; INTRAVENOUS; SOFT TISSUE
Status: COMPLETED | OUTPATIENT
Start: 2019-11-26 | End: 2019-11-26

## 2019-11-26 RX ORDER — IPRATROPIUM BROMIDE AND ALBUTEROL SULFATE 2.5; .5 MG/3ML; MG/3ML
3 SOLUTION RESPIRATORY (INHALATION)
Status: COMPLETED | OUTPATIENT
Start: 2019-11-26 | End: 2019-11-26

## 2019-11-26 RX ADMIN — DEXAMETHASONE SODIUM PHOSPHATE 10 MG: 4 INJECTION, SOLUTION INTRAMUSCULAR; INTRAVENOUS at 02:13

## 2019-11-26 RX ADMIN — IPRATROPIUM BROMIDE AND ALBUTEROL SULFATE 3 ML: .5; 3 SOLUTION RESPIRATORY (INHALATION) at 01:07

## 2019-11-26 NOTE — ED NOTES
After pt was medically cleared and discharged, she refused to leave her room. Grew hostile with ED staff. Security was called to escort pt from room to lobby.

## 2019-11-26 NOTE — BSMART NOTE
Comprehensive Assessment Form Part 1 Section I - Disposition The plan is for the patient to follow up with outpatient services. The Medical Doctor, Fior Huffman MD, is in agreement with disposition. Section II - Integrated Summary The information is given by the patient. The Chief Complaint is heroin detox. The Precipitant Factors are substance abuse. Patient requesting heroin detox. Patient reports being on dope over 30 years. Patient seen in ER for same complaint 11/22 and discharged due to inability to secure an inpatient detox bed due to patient's medical needs. Patient reports she does not use oxygen daily only PRN and is unable to recall the last time she required use. Per chart notes patient seen in ED in 10/2019 and reported using 3L oxygen at home. Patient provided a nebulized treatment in ER today for wheezing. Patient not currently on oxygen. Patient not willing to discuss outpatient detox and request this writer assist with inpatient detox. Facilities from earlier in the week contacted and all declines upheld. Patient presented a piece of paper with a \"Dr. BURT\" cell phone number and a note that [facility] would consider patient for long term substance abuse. Patient requested this writer call the physician for details. Physician contacted, no response, voicemail noted Dr. Tarah Dinh is the clinical health manager at [address] [place] [address]. Patient denies SI today or within past 48 hours. Patient reports prior suicide attempt \"many years ago. \" Patient denies SI, HI, and aud/vis hallucinations. The patient's appearance shows no evidence of impairment. The patient's behavior shows poor impulse control. The patient is oriented to time, place, person and situation. The patient's speech is loud. The patient's mood  is irritable. The range of affect is flat. The patient's thought content  demonstrates no evidence of impairment. The thought process shows no evidence of impairment.  The patient's appetite shows no evidence of impairment. The patient's sleep has evidence of insomnia. The patient's insight is blaming. The patient's judgement shows no evidence of impairment. Discussed with patient follow up with [facility]. Attempted to discuss outpatient detox, patient not receptive, written material left for patient. Patient does not meet inpatient criteria. Roxy Oakes

## 2019-11-26 NOTE — ED NOTES
1:21 AM :Pt care assumed from Dr. Angelina Gomez , ED provider. Pt complaint(s), current treatment plan, progression and available diagnostic results have been discussed thoroughly. Rounding occurred: yes  Intended Disposition: TBD   Pending diagnostic reports and/or labs (please list): pending crisis, medically clreared    Viewed labs, note, patient has not been seen by crisis. I called crisis and they have now seen her, she is denying any suicidality, homicidality, not hearing voices, she is not a candidate for admission for any psychiatric reason, patient now stating she just wants detoxification. She did receive Suboxone here today, she been counseled to follow-up and receive this each day here on out. She is been provided with outpatient detoxification options, she is now stating she is short of breath when we told her she is ready for discharge, one treatment was given, she does have some wheezing, suspect medicine will be given but she has a long history of chronic COPD and is always wheezing. She is not hypoxic, not tachypneic, blood pressures slightly elevated but not dangerously so. Safe for discharge at this time    Safe for d/c, careful return precautions given.  Pt/family comfortable with plan    Larry Parks MD

## 2019-12-20 ENCOUNTER — HOSPITAL ENCOUNTER (EMERGENCY)
Age: 54
Discharge: LEFT BEFORE DISCHARGE | End: 2019-12-20
Attending: EMERGENCY MEDICINE
Payer: MEDICAID

## 2019-12-20 ENCOUNTER — APPOINTMENT (OUTPATIENT)
Dept: GENERAL RADIOLOGY | Age: 54
End: 2019-12-20
Attending: EMERGENCY MEDICINE
Payer: MEDICAID

## 2019-12-20 VITALS
OXYGEN SATURATION: 99 % | BODY MASS INDEX: 34.27 KG/M2 | HEART RATE: 74 BPM | DIASTOLIC BLOOD PRESSURE: 95 MMHG | SYSTOLIC BLOOD PRESSURE: 192 MMHG | WEIGHT: 170 LBS | HEIGHT: 59 IN | TEMPERATURE: 97.9 F | RESPIRATION RATE: 13 BRPM

## 2019-12-20 DIAGNOSIS — J44.1 COPD EXACERBATION (HCC): Primary | ICD-10-CM

## 2019-12-20 LAB
ALBUMIN SERPL-MCNC: 4 G/DL (ref 3.4–5)
ALBUMIN/GLOB SERPL: 1 {RATIO} (ref 0.8–1.7)
ALP SERPL-CCNC: 122 U/L (ref 45–117)
ALT SERPL-CCNC: 17 U/L (ref 13–56)
ANION GAP SERPL CALC-SCNC: 6 MMOL/L (ref 3–18)
AST SERPL-CCNC: 9 U/L (ref 10–38)
BASOPHILS # BLD: 0 K/UL (ref 0–0.1)
BASOPHILS NFR BLD: 0 % (ref 0–2)
BILIRUB SERPL-MCNC: 0.9 MG/DL (ref 0.2–1)
BUN SERPL-MCNC: 11 MG/DL (ref 7–18)
BUN/CREAT SERPL: 10 (ref 12–20)
CALCIUM SERPL-MCNC: 9.1 MG/DL (ref 8.5–10.1)
CHLORIDE SERPL-SCNC: 105 MMOL/L (ref 100–111)
CO2 SERPL-SCNC: 30 MMOL/L (ref 21–32)
CREAT SERPL-MCNC: 1.06 MG/DL (ref 0.6–1.3)
DIFFERENTIAL METHOD BLD: NORMAL
EOSINOPHIL # BLD: 0.1 K/UL (ref 0–0.4)
EOSINOPHIL NFR BLD: 1 % (ref 0–5)
ERYTHROCYTE [DISTWIDTH] IN BLOOD BY AUTOMATED COUNT: 13.4 % (ref 11.6–14.5)
GLOBULIN SER CALC-MCNC: 3.9 G/DL (ref 2–4)
GLUCOSE SERPL-MCNC: 107 MG/DL (ref 74–99)
HCT VFR BLD AUTO: 41.4 % (ref 35–45)
HGB BLD-MCNC: 13.7 G/DL (ref 12–16)
LYMPHOCYTES # BLD: 2.6 K/UL (ref 0.9–3.6)
LYMPHOCYTES NFR BLD: 41 % (ref 21–52)
MCH RBC QN AUTO: 28.2 PG (ref 24–34)
MCHC RBC AUTO-ENTMCNC: 33.1 G/DL (ref 31–37)
MCV RBC AUTO: 85.4 FL (ref 74–97)
MONOCYTES # BLD: 0.2 K/UL (ref 0.05–1.2)
MONOCYTES NFR BLD: 4 % (ref 3–10)
NEUTS SEG # BLD: 3.4 K/UL (ref 1.8–8)
NEUTS SEG NFR BLD: 54 % (ref 40–73)
PLATELET # BLD AUTO: 240 K/UL (ref 135–420)
PMV BLD AUTO: 10.8 FL (ref 9.2–11.8)
POTASSIUM SERPL-SCNC: 3.5 MMOL/L (ref 3.5–5.5)
PROT SERPL-MCNC: 7.9 G/DL (ref 6.4–8.2)
RBC # BLD AUTO: 4.85 M/UL (ref 4.2–5.3)
SODIUM SERPL-SCNC: 141 MMOL/L (ref 136–145)
WBC # BLD AUTO: 6.3 K/UL (ref 4.6–13.2)

## 2019-12-20 PROCEDURE — 99284 EMERGENCY DEPT VISIT MOD MDM: CPT

## 2019-12-20 PROCEDURE — 71045 X-RAY EXAM CHEST 1 VIEW: CPT

## 2019-12-20 PROCEDURE — 93005 ELECTROCARDIOGRAM TRACING: CPT

## 2019-12-20 PROCEDURE — 94660 CPAP INITIATION&MGMT: CPT

## 2019-12-20 PROCEDURE — 94762 N-INVAS EAR/PLS OXIMTRY CONT: CPT

## 2019-12-20 PROCEDURE — 80053 COMPREHEN METABOLIC PANEL: CPT

## 2019-12-20 PROCEDURE — 85025 COMPLETE CBC W/AUTO DIFF WBC: CPT

## 2019-12-20 RX ORDER — IPRATROPIUM BROMIDE AND ALBUTEROL SULFATE 2.5; .5 MG/3ML; MG/3ML
3 SOLUTION RESPIRATORY (INHALATION)
Status: ACTIVE | OUTPATIENT
Start: 2019-12-20 | End: 2019-12-20

## 2019-12-20 RX ORDER — AZITHROMYCIN 500 MG/1
TABLET, FILM COATED ORAL
Qty: 1 TAB | Refills: 0 | OUTPATIENT
Start: 2019-12-20 | End: 2020-01-17

## 2019-12-20 RX ORDER — PREDNISONE 20 MG/1
60 TABLET ORAL DAILY
Qty: 15 TAB | Refills: 0 | Status: SHIPPED | OUTPATIENT
Start: 2019-12-20 | End: 2019-12-25

## 2019-12-20 RX ORDER — AZITHROMYCIN 250 MG/1
TABLET, FILM COATED ORAL
Qty: 4 TAB | Refills: 0 | Status: SHIPPED | OUTPATIENT
Start: 2019-12-20 | End: 2019-12-25

## 2019-12-20 RX ORDER — ALBUTEROL SULFATE 90 UG/1
2 AEROSOL, METERED RESPIRATORY (INHALATION)
Qty: 1 INHALER | Refills: 0 | Status: SHIPPED | OUTPATIENT
Start: 2019-12-20 | End: 2020-01-17

## 2019-12-20 NOTE — ED NOTES
Pt left ama. . form is signed. . IV catheter discontinued intact. Site without signs and symptoms of complications. Dressing and pressure applied. Pt pt says she has a ride waiting. . Dr Fern Lemons is aware. Adryan Gaitan

## 2019-12-20 NOTE — ED PROVIDER NOTES
EMERGENCY DEPARTMENT HISTORY AND PHYSICAL EXAM    1:16 PM  Date: 12/20/2019  Patient Name: Donovan Brar    History of Presenting Illness     Chief Complaint   Patient presents with    COPD    Shortness of Breath       History Provided By: patient     HPI: Donovan Brar is a 53 y.o. female with past medical history of COPD, CHF polysubstance abuse on methadone presents with in respiratory distress. Patient was en route to her pulmonologist when she fell SOB. She states for a few days she has had difficulty breathing. Denies any cough any fever any chills. PCP: Emily Hahn NP    Past History     Past Medical History:  Past Medical History:   Diagnosis Date    Asthma     CHF (congestive heart failure) (HCC)     Chronic obstructive pulmonary disease (HCC)     Dependence on supplemental oxygen     Endocrine disease     thyroid issues    Gastrointestinal disorder     \"blockage in my stomach\"    Hypercholesterolemia     Hypertension        Past Surgical History:  Past Surgical History:   Procedure Laterality Date    HX GI      Exploratory laparotomy with lysis of adhesions and primary repair of incarcerated umbilical hernia       Family History:  History reviewed. No pertinent family history. Social History:  Social History     Tobacco Use    Smoking status: Current Every Day Smoker     Packs/day: 0.25    Smokeless tobacco: Former User   Substance Use Topics    Alcohol use: No     Comment: socially    Drug use: Not Currently     Types: Heroin       Allergies:  No Known Allergies    Review of Systems   Review of Systems   Constitutional: Negative for activity change, appetite change and chills. HENT: Negative for congestion, ear discharge, ear pain and sore throat. Eyes: Negative for photophobia and pain. Respiratory: Positive for shortness of breath. Negative for cough and choking. Cardiovascular: Negative for palpitations and leg swelling.    Gastrointestinal: Negative for anal bleeding and rectal pain. Endocrine: Negative for polydipsia and polyuria. Genitourinary: Negative for genital sores and urgency. Musculoskeletal: Negative for arthralgias and myalgias. Neurological: Negative for dizziness, seizures and speech difficulty. Psychiatric/Behavioral: Negative for hallucinations, self-injury and suicidal ideas. Physical Exam     No data found. Physical Exam  Vitals signs and nursing note reviewed. Constitutional:       Appearance: She is well-developed. HENT:      Head: Normocephalic and atraumatic. Eyes:      General:         Right eye: No discharge. Left eye: No discharge. Neck:      Musculoskeletal: Normal range of motion and neck supple. Cardiovascular:      Rate and Rhythm: Normal rate and regular rhythm. Heart sounds: Normal heart sounds. No murmur. Pulmonary:      Effort: Pulmonary effort is normal. No respiratory distress. Breath sounds: No stridor. Examination of the right-upper field reveals decreased breath sounds. Examination of the left-upper field reveals decreased breath sounds. Examination of the right-middle field reveals decreased breath sounds. Examination of the left-middle field reveals decreased breath sounds. Examination of the right-lower field reveals decreased breath sounds. Examination of the left-lower field reveals decreased breath sounds. Decreased breath sounds present. No wheezing or rales. Chest:      Chest wall: No tenderness. Abdominal:      General: Bowel sounds are normal. There is no distension. Palpations: Abdomen is soft. Tenderness: There is no tenderness. There is no guarding or rebound. Musculoskeletal: Normal range of motion. Skin:     General: Skin is warm and dry. Neurological:      Mental Status: She is alert and oriented to person, place, and time.          Diagnostic Study Results     Labs -  No results found for this or any previous visit (from the past 12 hour(s)). Radiologic Studies -   Xr Chest Port    Result Date: 12/20/2019   IMPRESSION: 1. Sequela of mild to moderate congestion. Follow-up with plain imaging. Medical Decision Making     ED Course: Progress Notes, Reevaluation, and Consults:    1:16 PM Initial assessment performed. The patients presenting problems have been discussed, and they/their family are in agreement with the care plan formulated and outlined with them. I have encouraged them to ask questions as they arise throughout their visit. Provider Notes (Medical Decision Making): Patient in respiratory distress  Placed on BiPAP  Given duo nebs and methylprednisolone  Patient feels better on reassessment, better air entry bilaterally  refused ABG, advised to stay longer on bipap but she is adamant about leaving  Will be discharged with steroids, azithromycin and albuterol inhaler  Advised her to f/u with PMD and pulmonologist      Vital Signs-Reviewed the patient's vital signs. Reviewed pt's pulse ox reading. Records Reviewed:  old medical records(Time of Review: 1:16 PM)  -I am the first provider for this patient.  -I reviewed the vital signs, available nursing notes, past medical history, past surgical history, family history and social history. Current Outpatient Medications   Medication Sig Dispense Refill    albuterol (PROVENTIL HFA, VENTOLIN HFA, PROAIR HFA) 90 mcg/actuation inhaler Take 2 Puffs by inhalation every four (4) hours as needed for Wheezing. 1 Inhaler 0    predniSONE (DELTASONE) 20 mg tablet Take 60 mg by mouth daily for 5 days. 15 Tab 0    azithromycin (ZITHROMAX TRI-KIM) 500 mg tab Take one 500mg tablet by mouth today, and then take one 250mg tablet by mouth once daily for 4 days.  1 Tab 0    azithromycin (ZITHROMAX Z-KIM) 250 mg tablet Starting tomorrow, take one 250mg tablet by mouth once daily for 4 days 4 Tab 0    albuterol (VENTOLIN HFA) 90 mcg/actuation inhaler Take 2 Puffs by inhalation every four (4) hours as needed for Wheezing. 1 Inhaler 0    OXYGEN-AIR DELIVERY SYSTEMS 3 L by IntraNASal route as needed for Other (sob).  ciprofloxacin HCl (CIPRO) 250 mg tablet Take 2 Tabs by mouth two (2) times a day. Indications: Bacterial Urinary Tract Infection 15 Tab 0    ibuprofen (MOTRIN) 800 mg tablet Take 1 Tab by mouth three (3) times daily as needed for Pain. 40 Tab 0    docusate sodium (COLACE) 100 mg capsule Take 1 Cap by mouth two (2) times a day. 120 Cap 0    ondansetron (ZOFRAN ODT) 4 mg disintegrating tablet Take 1 Tab by mouth every eight (8) hours as needed for Nausea. Indications: prevent nausea and vomiting after surgery 20 Tab 0    hydroCHLOROthiazide (HYDRODIURIL) 50 mg tablet Take 25 mg by mouth daily.  QUEtiapine (SEROQUEL) 300 mg tablet Take 300 mg by mouth nightly. Indications: States she lost her daughter      fluticasone furoate-vilanterol (BREO ELLIPTA) 200-25 mcg/dose inhaler Take 1 Puff by inhalation daily.  lisinopril (PRINIVIL, ZESTRIL) 20 mg tablet Take 20 mg by mouth daily.  amLODIPine (NORVASC) 10 mg tablet Take 1 Tab by mouth daily. 30 Tab 0    arformoterol (BROVANA) 15 mcg/2 mL nebu neb solution 2 mL by Nebulization route two (2) times a day. 60 Vial 0    budesonide (PULMICORT) 0.5 mg/2 mL nbsp 2 mL by Nebulization route two (2) times a day. 60 Each 0    tiotropium (SPIRIVA) 18 mcg inhalation capsule Take 1 Cap by inhalation daily. 30 Cap 0    atorvastatin (LIPITOR) 20 mg tablet Take 1 Tab by mouth nightly. 30 Tab 0    cloNIDine HCl (CATAPRES) 0.2 mg tablet Take 1 Tab by mouth two (2) times a day. 60 Tab 0    escitalopram oxalate (LEXAPRO) 10 mg tablet Take 1 Tab by mouth every evening. 30 Tab 0    metoprolol tartrate (LOPRESSOR) 25 mg tablet Take 0.5 Tabs by mouth every twelve (12) hours. 30 Tab 0    roflumilast (DALIRESP) 500 mcg tab tablet Take 1 Tab by mouth daily.  30 Tab 0    OTHER Incentive spirometry- use as directed 1 Each 0    naloxone [facility]) 4 mg/actuation nasal spray Use 1 spray intranasally, then discard. Repeat with new spray every 2 min as needed for opioid overdose symptoms, alternating nostrils. 1 Each 0    budesonide-formoterol (SYMBICORT) 160-4.5 mcg/actuation HFAA Take 2 Puffs by inhalation two (2) times a day. 1 Inhaler 1    famotidine (PEPCID) 20 mg tablet Take 1 Tab by mouth daily. 30 Tab 1    montelukast (SINGULAIR) 10 mg tablet Take 1 Tab by mouth nightly. 30 Tab 1        Clinical Impression     Clinical Impression:   1. COPD exacerbation ([address]        Disposition: discharge      DISCHARGE NOTE:   Pt has been reexamined. Patient has no new complaints, changes, or physical findings. Care plan outlined and precautions discussed. Results were reviewed with the patient. All medications were reviewed with the patient; will d/c home with PMD f/u. All of pt's questions and concerns were addressed. Patient was instructed and agrees to follow up with PMD, as well as to return to the ED upon further deterioration. Patient is ready to go home. This note was dictated utilizing voice recognition software which may lead to typographical errors. I apologize in advance if the situation occurs. If questions arise please do not hesitate to contact me or call our department. This note was dictated utilizing voice recognition software which may lead to typographical errors. I apologize in advance if the situation occurs. If questions arise please do not hesitate to contact me or call our department.     Aguilar Gatica MD  1:16 PM

## 2019-12-20 NOTE — ED TRIAGE NOTES
Pt arroved tp er lobby c/o sob. . she was brought immediately back to main er. .. and placed on bi-pap. ..

## 2019-12-20 NOTE — PROGRESS NOTES
Pt removed BiPAP, on room air in no apparent distress, denies SOB at this time, refuses ABG. MD Dr Vargsa Rater informed.

## 2019-12-20 NOTE — ED TRIAGE NOTES
Patient was the code green in outpatient waiting room. Patient very short of breath. Patient speaking in broke sentence and unable to catch her breath. Patient was here to see her pulmonologist when she became short of breath, patient states that it started last night and got worse as the day went on.

## 2019-12-22 LAB
ATRIAL RATE: 65 BPM
CALCULATED P AXIS, ECG09: 63 DEGREES
CALCULATED R AXIS, ECG10: 28 DEGREES
CALCULATED T AXIS, ECG11: 33 DEGREES
DIAGNOSIS, 93000: NORMAL
P-R INTERVAL, ECG05: 144 MS
Q-T INTERVAL, ECG07: 434 MS
QRS DURATION, ECG06: 90 MS
QTC CALCULATION (BEZET), ECG08: 451 MS
VENTRICULAR RATE, ECG03: 65 BPM

## 2019-12-26 ENCOUNTER — HOME CARE VISIT (OUTPATIENT)
Dept: HOME HEALTH SERVICES | Facility: HOME HEALTH | Age: 54
End: 2019-12-26

## 2019-12-29 ENCOUNTER — HOSPITAL ENCOUNTER (EMERGENCY)
Age: 54
Discharge: HOME | End: 2019-12-30
Attending: EMERGENCY MEDICINE
Payer: MEDICAID

## 2019-12-29 ENCOUNTER — APPOINTMENT (OUTPATIENT)
Dept: GENERAL RADIOLOGY | Age: 54
End: 2019-12-29
Attending: EMERGENCY MEDICINE
Payer: MEDICAID

## 2019-12-29 DIAGNOSIS — R05.9 COUGH: ICD-10-CM

## 2019-12-29 DIAGNOSIS — J11.1 INFLUENZA-LIKE ILLNESS: Primary | ICD-10-CM

## 2019-12-29 PROCEDURE — 99284 EMERGENCY DEPT VISIT MOD MDM: CPT

## 2019-12-29 PROCEDURE — 94640 AIRWAY INHALATION TREATMENT: CPT

## 2019-12-29 PROCEDURE — 71045 X-RAY EXAM CHEST 1 VIEW: CPT

## 2019-12-29 RX ORDER — OSELTAMIVIR PHOSPHATE 75 MG/1
75 CAPSULE ORAL ONCE
Status: COMPLETED | OUTPATIENT
Start: 2019-12-29 | End: 2019-12-30

## 2019-12-29 RX ORDER — IPRATROPIUM BROMIDE AND ALBUTEROL SULFATE 2.5; .5 MG/3ML; MG/3ML
3 SOLUTION RESPIRATORY (INHALATION)
Status: COMPLETED | OUTPATIENT
Start: 2019-12-29 | End: 2019-12-30

## 2019-12-30 VITALS
SYSTOLIC BLOOD PRESSURE: 128 MMHG | TEMPERATURE: 97.9 F | HEART RATE: 65 BPM | RESPIRATION RATE: 15 BRPM | DIASTOLIC BLOOD PRESSURE: 55 MMHG | OXYGEN SATURATION: 100 %

## 2019-12-30 PROCEDURE — 74011250637 HC RX REV CODE- 250/637: Performed by: EMERGENCY MEDICINE

## 2019-12-30 PROCEDURE — 74011000250 HC RX REV CODE- 250: Performed by: EMERGENCY MEDICINE

## 2019-12-30 RX ORDER — OSELTAMIVIR PHOSPHATE 75 MG/1
75 CAPSULE ORAL 2 TIMES DAILY
Qty: 10 CAP | Refills: 0 | Status: SHIPPED | OUTPATIENT
Start: 2019-12-30 | End: 2020-01-04

## 2019-12-30 RX ORDER — ALBUTEROL SULFATE 90 UG/1
2 AEROSOL, METERED RESPIRATORY (INHALATION)
Status: COMPLETED | OUTPATIENT
Start: 2019-12-30 | End: 2019-12-30

## 2019-12-30 RX ORDER — ALBUTEROL SULFATE 1.25 MG/3ML
1.25 SOLUTION RESPIRATORY (INHALATION)
Qty: 25 EACH | Refills: 0 | Status: SHIPPED | OUTPATIENT
Start: 2019-12-30 | End: 2020-04-27 | Stop reason: SDUPTHER

## 2019-12-30 RX ADMIN — OSELTAMIVIR PHOSPHATE 75 MG: 75 CAPSULE ORAL at 00:44

## 2019-12-30 RX ADMIN — IPRATROPIUM BROMIDE AND ALBUTEROL SULFATE 3 ML: .5; 3 SOLUTION RESPIRATORY (INHALATION) at 00:44

## 2019-12-30 RX ADMIN — ALBUTEROL SULFATE 2 PUFF: 90 AEROSOL, METERED RESPIRATORY (INHALATION) at 02:34

## 2019-12-30 NOTE — ED TRIAGE NOTES
Patient c/o of having flu like symptoms for the last 4 days. Patient states she always has difficulty breathing and that is not why she is here. Patient wears 3 L of oxygen at home. Patient placed on 3 l upon arrival. Patient in no distress at this time. Patient reports coughing up yellow sputum. Patient takes 50 of methadone daily. Upon arrival patient very groggy and lethargic.

## 2019-12-30 NOTE — DISCHARGE INSTRUCTIONS
As we have discussed, your evaluation today showed a flu like illness and cough. Please speak to your methadone clinic about lowering your dose of methadone. Your current dose is too strong and both your therapist and I believe you are very sleepy because of it. Please follow up with a doctor as discussed. The evaluation and treatment done today requires that you follow up with a physician for re-evaluation. Not following up could result in chronic pain/disability/injury. Medical problems can change over time and symptoms can get worse or new symptoms can develop over time, therefore, it is important that you follow up as we discussed or return immedediately to the ER. Diseases don't read textbooks and there are limitations to our evaluation and testing, so please immediately return to the ER if you have any concerns. Call the ER if you have any questions about what we discussed. Please visit [facility] for discounts on any prescriptions you may have. Please log in to your account to review your lab work and radiology results with your primary care doctor on follow up. Cough: Care Instructions  Your Care Instructions    A cough is your body's response to something that bothers your throat or airways. Many things can cause a cough. You might cough because of a cold or the flu, bronchitis, or asthma. Smoking, postnasal drip, allergies, and stomach acid that backs up into your throat also can cause coughs. A cough is a symptom, not a disease. Most coughs stop when the cause, such as a cold, goes away. You can take a few steps at home to cough less and feel better. Follow-up care is a key part of your treatment and safety. Be sure to make and go to all appointments, and call your doctor if you are having problems. It's also a good idea to know your test results and keep a list of the medicines you take. How can you care for yourself at home? · Drink lots of water and other fluids.  This helps thin the mucus and soothes a dry or sore throat. Honey or lemon juice in hot water or tea may ease a dry cough. · Take cough medicine as directed by your doctor. · Prop up your head on pillows to help you breathe and ease a dry cough. · Try cough drops to soothe a dry or sore throat. Cough drops don't stop a cough. Medicine-flavored cough drops are no better than candy-flavored drops or hard candy. · Do not smoke. Avoid secondhand smoke. If you need help quitting, talk to your doctor about stop-smoking programs and medicines. These can increase your chances of quitting for good. When should you call for help? Call 911 anytime you think you may need emergency care. For example, call if:    · You have severe trouble breathing.    Call your doctor now or seek immediate medical care if:    · You cough up blood.     · You have new or worse trouble breathing.     · You have a new or higher fever.     · You have a new rash.    Watch closely for changes in your health, and be sure to contact your doctor if:    · You cough more deeply or more often, especially if you notice more mucus or a change in the color of your mucus.     · You have new symptoms, such as a sore throat, an earache, or sinus pain.     · You do not get better as expected. Where can you learn more? Go to http://magda-kadie.info/. Enter D279 in the search box to learn more about \"Cough: Care Instructions. \"  Current as of: June 9, 2019  Content Version: 12.2  © 1962-0535 [employer]. Care instructions adapted under license by [employer] (which disclaims liability or warranty for this information). If you have questions about a medical condition or this instruction, always ask your healthcare professional. [place] [address] any warranty or liability for your use of this information.

## 2019-12-30 NOTE — ED PROVIDER NOTES
EMERGENCY DEPARTMENT HISTORY AND PHYSICAL EXAM    12:41 AM  Date: 12/29/2019  Patient Name: Coco Soto    History of Presenting Illness     Chief Complaint   Patient presents with    Cough        History Provided By: Patient    HPI: Coco Soto is a 53 y.o. female with History of asthma, CHF, COPD, on home 3 L O2, here for cough. For the past several days patient has had cough with some yellow sputum. Is associated with diffuse myalgias. She did start methadone 50 this week. HPI is limited due to patient altered mental status     PCP: Sol Perdomo NP    Past History     Past Medical History:  Past Medical History:   Diagnosis Date    Asthma     [address] ([address]estive heart failure) ([facility] [address]     Chronic obstructive pulmonary disease (HCC)     Dependence on supplemental oxygen     Endocrine disease     thyroid issues    Gastrointestinal disorder     \"blockage in my stomach\"    Hypercholesterolemia     Hypertension        Past Surgical History:  Past Surgical History:   Procedure Laterality Date    HX GI      Exploratory laparotomy with lysis of adhesions and primary repair of incarcerated umbilical hernia       Family History:  History reviewed. No pertinent family history. Social History:  Social History     Tobacco Use    Smoking status: Current Every Day Smoker     Packs/day: 0.25    Smokeless tobacco: Former User   Substance Use Topics    Alcohol use: No     Comment: socially    Drug use: Not Currently     Types: Heroin       Allergies:  No Known Allergies    Review of Systems     ROS limited due to patient altered mental status. Physical Exam     Patient Vitals for the past 12 hrs:   Temp Pulse Resp BP SpO2   12/30/19 0130     100 %   12/29/19 2253 97.9 °F (36.6 °C) 65 15 128/55 99 %       Physical Exam   Constitutional: Appears well-developed. Is not diaphoretic. Eyes: Conjunctiva clear, EOMI  Head: Normocephalic and atraumatic. Neck: Normal range of motion.  No stridor or tracheal deviation. Cardiovascular: Intact distal pulses. Pulmonary/Chest: Effort normal and no respiratory distress. Abdominal: Non distended. Musculoskeletal: Normal range of motion. Exhibits no tenderness. Neurological: Sleepy but able to be awakened. Skin: Skin is warm and dry. Psychiatric: Has a normal mood and affect. Behavior is normal.   Nursing note and vitals reviewed. Diagnostic Study Results     Labs -  No results found for this or any previous visit (from the past 12 hour(s)). Radiologic Studies -   No results found. Medical Decision Making     ED Course: Progress Notes, Reevaluation, and Consults:    12:41 AM Initial assessment performed. The patients presenting problems have been discussed, and they/their family are in agreement with the care plan formulated and outlined with them. I have encouraged them to ask questions as they arise throughout their visit. 2:32 AM -on reassessment patient is feeling better and back to her mental baseline. Concern for flulike illness given that she did not have the vaccine this year and now has muscle aches, cough. Patient has had a broken nebulizer at home so we will discharge her here currently with a albuterol inhaler, Tamiflu, and patient will follow with her primary doctor ASAP. 12:41 AM  Willow Hodge  results have been reviewed with her. She has been counseled regarding her diagnosis. She verbally conveys understanding and agreement of the signs, symptoms, diagnosis, treatment and prognosis and additionally agrees to follow up as recommended with Dr. Ramirez Tompkins, MELINDA in 24 - 48 hours. She also agrees with the care-plan and conveys that all of her questions have been answered.   I have also put together some discharge instructions for her that include: 1) educational information regarding their diagnosis, 2) how to care for their diagnosis at home, as well a 3) list of reasons why they would want to return to the ED prior to their follow-up appointment, should their condition change. Vital Signs-Reviewed the patient's vital signs. Reviewed pt's pulse ox reading. Records Reviewed: Nursing Notes and Old Medical Records (Time of Review: 12:41 AM)  -I am the first provider for this patient.  -I reviewed the vital signs, available nursing notes, past medical history, past surgical history, family history and social history. Current Facility-Administered Medications   Medication Dose Route Frequency Provider Last Rate Last Dose    albuterol (PROVENTIL HFA, VENTOLIN HFA, PROAIR HFA) inhaler 2 Puff  2 Puff Inhalation NOW Summer Rosen MD         Current Outpatient Medications   Medication Sig Dispense Refill    oseltamivir (TAMIFLU) 75 mg capsule Take 1 Cap by mouth two (2) times a day for 5 days. 10 Cap 0    albuterol (PROVENTIL HFA, VENTOLIN HFA, PROAIR HFA) 90 mcg/actuation inhaler Take 2 Puffs by inhalation every four (4) hours as needed for Wheezing. 1 Inhaler 0    azithromycin (ZITHROMAX TRI-KIM) 500 mg tab Take one 500mg tablet by mouth today, and then take one 250mg tablet by mouth once daily for 4 days. 1 Tab 0    albuterol (VENTOLIN HFA) 90 mcg/actuation inhaler Take 2 Puffs by inhalation every four (4) hours as needed for Wheezing. 1 Inhaler 0    OXYGEN-AIR DELIVERY SYSTEMS 3 L by IntraNASal route as needed for Other (sob).  ciprofloxacin HCl (CIPRO) 250 mg tablet Take 2 Tabs by mouth two (2) times a day. Indications: Bacterial Urinary Tract Infection 15 Tab 0    ibuprofen (MOTRIN) 800 mg tablet Take 1 Tab by mouth three (3) times daily as needed for Pain. 40 Tab 0    docusate sodium (COLACE) 100 mg capsule Take 1 Cap by mouth two (2) times a day. 120 Cap 0    ondansetron (ZOFRAN ODT) 4 mg disintegrating tablet Take 1 Tab by mouth every eight (8) hours as needed for Nausea.  Indications: prevent nausea and vomiting after surgery 20 Tab 0    hydroCHLOROthiazide (HYDRODIURIL) 50 mg tablet Take 25 mg by mouth daily.  QUEtiapine (SEROQUEL) 300 mg tablet Take 300 mg by mouth nightly. Indications: States she lost her daughter      fluticasone furoate-vilanterol (BREO ELLIPTA) 200-25 mcg/dose inhaler Take 1 Puff by inhalation daily.  lisinopril (PRINIVIL, ZESTRIL) 20 mg tablet Take 20 mg by mouth daily.  amLODIPine (NORVASC) 10 mg tablet Take 1 Tab by mouth daily. 30 Tab 0    arformoterol (BROVANA) 15 mcg/2 mL nebu neb solution 2 mL by Nebulization route two (2) times a day. 60 Vial 0    budesonide (PULMICORT) 0.5 mg/2 mL nbsp 2 mL by Nebulization route two (2) times a day. 60 Each 0    tiotropium (SPIRIVA) 18 mcg inhalation capsule Take 1 Cap by inhalation daily. 30 Cap 0    atorvastatin (LIPITOR) 20 mg tablet Take 1 Tab by mouth nightly. 30 Tab 0    cloNIDine HCl (CATAPRES) 0.2 mg tablet Take 1 Tab by mouth two (2) times a day. 60 Tab 0    escitalopram oxalate (LEXAPRO) 10 mg tablet Take 1 Tab by mouth every evening. 30 Tab 0    metoprolol tartrate (LOPRESSOR) 25 mg tablet Take 0.5 Tabs by mouth every twelve (12) hours. 30 Tab 0    roflumilast (DALIRESP) 500 mcg tab tablet Take 1 Tab by mouth daily. 30 Tab 0    OTHER Incentive spirometry- use as directed 1 Each 0    naloxone (NARCAN) 4 mg/actuation nasal spray Use 1 spray intranasally, then discard. Repeat with new spray every 2 min as needed for opioid overdose symptoms, alternating nostrils. 1 Each 0    budesonide-formoterol (SYMBICORT) 160-4.5 mcg/actuation HFAA Take 2 Puffs by inhalation two (2) times a day. 1 Inhaler 1    famotidine (PEPCID) 20 mg tablet Take 1 Tab by mouth daily. 30 Tab 1    montelukast (SINGULAIR) 10 mg tablet Take 1 Tab by mouth nightly. 30 Tab 1        Clinical Impression     Clinical Impression:   1. Influenza-like illness    2. Cough        Disposition: DC home        This note was dictated utilizing voice recognition software which may lead to typographical errors.   I apologize in advance if the situation occurs. If questions arise please do not hesitate to contact me or call our department.

## 2020-01-17 ENCOUNTER — HOSPITAL ENCOUNTER (EMERGENCY)
Age: 55
Discharge: HOME | End: 2020-01-17
Attending: EMERGENCY MEDICINE
Payer: COMMERCIAL

## 2020-01-17 ENCOUNTER — APPOINTMENT (OUTPATIENT)
Dept: GENERAL RADIOLOGY | Age: 55
End: 2020-01-17
Attending: EMERGENCY MEDICINE
Payer: COMMERCIAL

## 2020-01-17 VITALS
DIASTOLIC BLOOD PRESSURE: 94 MMHG | RESPIRATION RATE: 26 BRPM | HEART RATE: 75 BPM | TEMPERATURE: 97.6 F | OXYGEN SATURATION: 100 % | SYSTOLIC BLOOD PRESSURE: 229 MMHG | HEIGHT: 59 IN | WEIGHT: 165 LBS | BODY MASS INDEX: 33.26 KG/M2

## 2020-01-17 DIAGNOSIS — J44.1 ACUTE EXACERBATION OF CHRONIC OBSTRUCTIVE PULMONARY DISEASE (COPD) (HCC): Primary | ICD-10-CM

## 2020-01-17 DIAGNOSIS — I10 ELEVATED BLOOD PRESSURE READING WITH DIAGNOSIS OF HYPERTENSION: ICD-10-CM

## 2020-01-17 LAB
ANION GAP SERPL CALC-SCNC: 0 MMOL/L (ref 3–18)
ATRIAL RATE: 76 BPM
BASOPHILS # BLD: 0 K/UL (ref 0–0.1)
BASOPHILS NFR BLD: 0 % (ref 0–2)
BUN SERPL-MCNC: 14 MG/DL (ref 7–18)
BUN/CREAT SERPL: 15 (ref 12–20)
CALCIUM SERPL-MCNC: 8.9 MG/DL (ref 8.5–10.1)
CALCULATED P AXIS, ECG09: 73 DEGREES
CALCULATED R AXIS, ECG10: -36 DEGREES
CALCULATED T AXIS, ECG11: 79 DEGREES
CHLORIDE SERPL-SCNC: 104 MMOL/L (ref 100–111)
CK MB CFR SERPL CALC: 0.4 % (ref 0–4)
CK MB SERPL-MCNC: 1.1 NG/ML (ref 5–25)
CK SERPL-CCNC: 255 U/L (ref 26–192)
CO2 SERPL-SCNC: 33 MMOL/L (ref 21–32)
CREAT SERPL-MCNC: 0.93 MG/DL (ref 0.6–1.3)
DIAGNOSIS, 93000: NORMAL
DIFFERENTIAL METHOD BLD: ABNORMAL
EOSINOPHIL # BLD: 0.1 K/UL (ref 0–0.4)
EOSINOPHIL NFR BLD: 2 % (ref 0–5)
ERYTHROCYTE [DISTWIDTH] IN BLOOD BY AUTOMATED COUNT: 14.1 % (ref 11.6–14.5)
GLUCOSE SERPL-MCNC: 76 MG/DL (ref 74–99)
HCT VFR BLD AUTO: 44.1 % (ref 35–45)
HGB BLD-MCNC: 14.2 G/DL (ref 12–16)
LYMPHOCYTES # BLD: 1.6 K/UL (ref 0.9–3.6)
LYMPHOCYTES NFR BLD: 29 % (ref 21–52)
MCH RBC QN AUTO: 28.3 PG (ref 24–34)
MCHC RBC AUTO-ENTMCNC: 32.2 G/DL (ref 31–37)
MCV RBC AUTO: 87.8 FL (ref 74–97)
MONOCYTES # BLD: 0.4 K/UL (ref 0.05–1.2)
MONOCYTES NFR BLD: 6 % (ref 3–10)
NEUTS SEG # BLD: 3.6 K/UL (ref 1.8–8)
NEUTS SEG NFR BLD: 63 % (ref 40–73)
P-R INTERVAL, ECG05: 130 MS
PLATELET # BLD AUTO: 165 K/UL (ref 135–420)
PMV BLD AUTO: 12.7 FL (ref 9.2–11.8)
POTASSIUM SERPL-SCNC: 5.1 MMOL/L (ref 3.5–5.5)
Q-T INTERVAL, ECG07: 452 MS
QRS DURATION, ECG06: 124 MS
QTC CALCULATION (BEZET), ECG08: 508 MS
RBC # BLD AUTO: 5.02 M/UL (ref 4.2–5.3)
SODIUM SERPL-SCNC: 137 MMOL/L (ref 136–145)
TROPONIN I SERPL-MCNC: <0.02 NG/ML (ref 0–0.04)
VENTRICULAR RATE, ECG03: 76 BPM
WBC # BLD AUTO: 5.7 K/UL (ref 4.6–13.2)

## 2020-01-17 PROCEDURE — 93005 ELECTROCARDIOGRAM TRACING: CPT

## 2020-01-17 PROCEDURE — 80048 BASIC METABOLIC PNL TOTAL CA: CPT

## 2020-01-17 PROCEDURE — 74011250637 HC RX REV CODE- 250/637: Performed by: EMERGENCY MEDICINE

## 2020-01-17 PROCEDURE — 94640 AIRWAY INHALATION TREATMENT: CPT

## 2020-01-17 PROCEDURE — 74011636637 HC RX REV CODE- 636/637: Performed by: EMERGENCY MEDICINE

## 2020-01-17 PROCEDURE — 82550 ASSAY OF CK (CPK): CPT

## 2020-01-17 PROCEDURE — 71046 X-RAY EXAM CHEST 2 VIEWS: CPT

## 2020-01-17 PROCEDURE — 85025 COMPLETE CBC W/AUTO DIFF WBC: CPT

## 2020-01-17 PROCEDURE — 99283 EMERGENCY DEPT VISIT LOW MDM: CPT

## 2020-01-17 PROCEDURE — 74011000250 HC RX REV CODE- 250: Performed by: EMERGENCY MEDICINE

## 2020-01-17 RX ORDER — METHOCARBAMOL 500 MG/1
500 TABLET, FILM COATED ORAL
Qty: 10 TAB | Refills: 0 | Status: SHIPPED | OUTPATIENT
Start: 2020-01-17 | End: 2020-01-22

## 2020-01-17 RX ORDER — PREDNISONE 20 MG/1
40 TABLET ORAL DAILY
Qty: 8 TAB | Refills: 0 | Status: SHIPPED | OUTPATIENT
Start: 2020-01-18 | End: 2020-01-22

## 2020-01-17 RX ORDER — ALBUTEROL SULFATE 90 UG/1
2 AEROSOL, METERED RESPIRATORY (INHALATION)
Qty: 1 INHALER | Refills: 0 | Status: SHIPPED | OUTPATIENT
Start: 2020-01-17 | End: 2020-04-27 | Stop reason: SDUPTHER

## 2020-01-17 RX ORDER — IPRATROPIUM BROMIDE AND ALBUTEROL SULFATE 2.5; .5 MG/3ML; MG/3ML
3 SOLUTION RESPIRATORY (INHALATION)
Status: DISCONTINUED | OUTPATIENT
Start: 2020-01-17 | End: 2020-01-17 | Stop reason: HOSPADM

## 2020-01-17 RX ORDER — IPRATROPIUM BROMIDE AND ALBUTEROL SULFATE 2.5; .5 MG/3ML; MG/3ML
3 SOLUTION RESPIRATORY (INHALATION)
Status: COMPLETED | OUTPATIENT
Start: 2020-01-17 | End: 2020-01-17

## 2020-01-17 RX ORDER — PREDNISONE 20 MG/1
60 TABLET ORAL
Status: COMPLETED | OUTPATIENT
Start: 2020-01-17 | End: 2020-01-17

## 2020-01-17 RX ORDER — DOXYCYCLINE HYCLATE 100 MG
100 TABLET ORAL 2 TIMES DAILY
Qty: 20 TAB | Refills: 0 | Status: SHIPPED | OUTPATIENT
Start: 2020-01-17 | End: 2020-01-27

## 2020-01-17 RX ORDER — FUROSEMIDE 10 MG/ML
40 INJECTION INTRAMUSCULAR; INTRAVENOUS
Status: DISCONTINUED | OUTPATIENT
Start: 2020-01-17 | End: 2020-01-17

## 2020-01-17 RX ORDER — METHOCARBAMOL 500 MG/1
750 TABLET, FILM COATED ORAL ONCE
Status: COMPLETED | OUTPATIENT
Start: 2020-01-17 | End: 2020-01-17

## 2020-01-17 RX ORDER — BENZONATATE 100 MG/1
200 CAPSULE ORAL
Qty: 30 CAP | Refills: 0 | Status: SHIPPED | OUTPATIENT
Start: 2020-01-17 | End: 2020-01-24

## 2020-01-17 RX ADMIN — PREDNISONE 60 MG: 20 TABLET ORAL at 15:56

## 2020-01-17 RX ADMIN — METHOCARBAMOL 750 MG: 500 TABLET, FILM COATED ORAL at 15:56

## 2020-01-17 RX ADMIN — IPRATROPIUM BROMIDE AND ALBUTEROL SULFATE 3 ML: .5; 3 SOLUTION RESPIRATORY (INHALATION) at 15:57

## 2020-01-17 NOTE — ED NOTES
pt yelling at nursing staff when attempting to give breathing treatment, pt demanding food and was informed that her complaint was breathing difficulty and that we would be addressing that issue first and would talk about food later.

## 2020-01-17 NOTE — DISCHARGE INSTRUCTIONS
If you were prescribed any medication take as directed. Do not drive or use heavy equipment if prescribed narcotics. Follow up with your primary care physician or with specialist as directed. Return to the emergency room with any new or worsening conditions. High Blood Pressure: Care Instructions  Overview    It's normal for blood pressure to go up and down throughout the day. But if it stays up, you have high blood pressure. Another name for high blood pressure is hypertension. Despite what a lot of people think, high blood pressure usually doesn't cause headaches or make you feel dizzy or lightheaded. It usually has no symptoms. But it does increase your risk of stroke, heart attack, and other problems. You and your doctor will talk about your risks of these problems based on your blood pressure. Your doctor will give you a goal for your blood pressure. Your goal will be based on your health and your age. Lifestyle changes, such as eating healthy and being active, are always important to help lower blood pressure. You might also take medicine to reach your blood pressure goal.  Follow-up care is a key part of your treatment and safety. Be sure to make and go to all appointments, and call your doctor if you are having problems. It's also a good idea to know your test results and keep a list of the medicines you take. How can you care for yourself at home? Medical treatment  · If you stop taking your medicine, your blood pressure will go back up. You may take one or more types of medicine to lower your blood pressure. Be safe with medicines. Take your medicine exactly as prescribed. Call your doctor if you think you are having a problem with your medicine. · Talk to your doctor before you start taking aspirin every day. Aspirin can help certain people lower their risk of a heart attack or stroke. But taking aspirin isn't right for everyone, because it can cause serious bleeding.   · See your doctor regularly. You may need to see the doctor more often at first or until your blood pressure comes down. · If you are taking blood pressure medicine, talk to your doctor before you take decongestants or anti-inflammatory medicine, such as ibuprofen. Some of these medicines can raise blood pressure. · Learn how to check your blood pressure at home. Lifestyle changes  · Stay at a healthy weight. This is especially important if you put on weight around the waist. Losing even 10 pounds can help you lower your blood pressure. · If your doctor recommends it, get more exercise. Walking is a good choice. Bit by bit, increase the amount you walk every day. Try for at least 30 minutes on most days of the week. You also may want to swim, bike, or do other activities. · Avoid or limit alcohol. Talk to your doctor about whether you can drink any alcohol. · Try to limit how much sodium you eat to less than 2,300 milligrams (mg) a day. Your doctor may ask you to try to eat less than 1,500 mg a day. · Eat plenty of fruits (such as bananas and oranges), vegetables, legumes, whole grains, and low-fat dairy products. · Lower the amount of saturated fat in your diet. Saturated fat is found in animal products such as milk, cheese, and meat. Limiting these foods may help you lose weight and also lower your risk for heart disease. · Do not smoke. Smoking increases your risk for heart attack and stroke. If you need help quitting, talk to your doctor about stop-smoking programs and medicines. These can increase your chances of quitting for good. When should you call for help? Call  911 anytime you think you may need emergency care. This may mean having symptoms that suggest that your blood pressure is causing a serious heart or blood vessel problem. Your blood pressure may be over 180/120.   For example, call  911 if:    · You have symptoms of a heart attack. These may include:  ?  Chest pain or pressure, or a strange feeling in the chest.  ? Sweating. ? Shortness of breath. ? Nausea or vomiting. ? Pain, pressure, or a strange feeling in the back, neck, jaw, or upper belly or in one or both shoulders or arms. ? Lightheadedness or sudden weakness. ? A fast or irregular heartbeat.     · You have symptoms of a stroke. These may include:  ? Sudden numbness, tingling, weakness, or loss of movement in your face, arm, or leg, especially on only one side of your body. ? Sudden vision changes. ? Sudden trouble speaking. ? Sudden confusion or trouble understanding simple statements. ? Sudden problems with walking or balance. ? A sudden, severe headache that is different from past headaches.     · You have severe back or belly pain.    Do not wait until your blood pressure comes down on its own. Get help right away.   Call your doctor now or seek immediate care if:    · Your blood pressure is much higher than normal (such as 180/120 or higher), but you don't have symptoms.     · You think high blood pressure is causing symptoms, such as:  ? Severe headache.  ? Blurry vision.    Watch closely for changes in your health, and be sure to contact your doctor if:    · Your blood pressure measures higher than your doctor recommends at least 2 times. That means the top number is higher or the bottom number is higher, or both.     · You think you may be having side effects from your blood pressure medicine. Where can you learn more? Go to http://magda-kadie.info/. Enter A010 in the search box to learn more about \"High Blood Pressure: Care Instructions. \"  Current as of: April 9, 2019  Content Version: 12.2  © 6636-9481 [employer]. Care instructions adapted under license by [employer] (which disclaims liability or warranty for this information).  If you have questions about a medical condition or this instruction, always ask your healthcare professional. Ervin Jaramillo disclaims any warranty or liability for your use of this information. Patient Education        COPD Exacerbation Plan: Care Instructions  Your Care Instructions    If you have chronic obstructive pulmonary disease (COPD), your usual shortness of breath could suddenly get worse. You may start coughing more and have more mucus. This flare-up is called a COPD exacerbation (say \"[id number]\"). A lung infection or air pollution could set off an exacerbation. Sometimes it can happen after a quick change in temperature or being around chemicals. Work with your doctor to make a plan for dealing with an exacerbation. You can better manage it if you plan ahead. Follow-up care is a key part of your treatment and safety. Be sure to make and go to all appointments, and call your doctor if you are having problems. It's also a good idea to know your test results and keep a list of the medicines you take. How can you care for yourself at home? During an exacerbation  · Do not panic if you start to have one. Quick treatment at home may help you prevent serious breathing problems. If you have a COPD exacerbation plan that you developed with your doctor, follow it. · Take your medicines exactly as your doctor tells you.  ? Use your inhaler as directed by your doctor. If your symptoms do not get better after you use your medicine, have someone take you to the emergency room. Call an ambulance if necessary. ? With inhaled medicines, a spacer or a nebulizer may help you get more medicine to your lungs. Ask your doctor or pharmacist how to use them properly. Practice using the spacer in front of a mirror before you have an exacerbation. This may help you get the medicine into your lungs quickly. ? If your doctor has given you steroid pills, take them as directed. ? Your doctor may have given you a prescription for antibiotics, which you can fill if you need it. ? Talk to your doctor if you have any problems with your medicine.  And call your doctor if you have to use your antibiotic or steroid pills. Preventing an exacerbation  · Do not smoke. This is the most important step you can take to prevent more damage to your lungs and prevent problems. If you already smoke, it is never too late to stop. If you need help quitting, talk to your doctor about stop-smoking programs and medicines. These can increase your chances of quitting for good. · Take your daily medicines as prescribed. · Avoid colds and flu. ? Get a pneumococcal vaccine. ? Get a flu vaccine each year, as soon as it is available. Ask those you live or work with to do the same, so they will not get the flu and infect you. ? Try to stay away from people with colds or the flu. ? Wash your hands often. · Avoid secondhand smoke; air pollution; cold, dry air; hot, humid air; and high altitudes. Stay at home with your windows closed when air pollution is bad. · Learn breathing techniques for COPD, such as breathing through pursed lips. These techniques can help you breathe easier during an exacerbation. When should you call for help? Call 911 anytime you think you may need emergency care. For example, call if:    · You have severe trouble breathing.     · You have severe chest pain.    Call your doctor now or seek immediate medical care if:    · You have new or worse shortness of breath.     · You develop new chest pain.     · You are coughing more deeply or more often, especially if you notice more mucus or a change in the color of your mucus.     · You cough up blood.     · You have new or increased swelling in your legs or belly.     · You have a fever.    Watch closely for changes in your health, and be sure to contact your doctor if:    · You need to use your antibiotic or steroid pills.     · Your symptoms are getting worse. Where can you learn more? Go to http://magda-kadie.info/.   Enter S534 in the search box to learn more about \"COPD Exacerbation Plan: Care Instructions. \"  Current as of: June 9, 2019  Content Version: 12.2  © 5576-6088 [employer]. Care instructions adapted under license by [employer] (which disclaims liability or warranty for this information). If you have questions about a medical condition or this instruction, always ask your healthcare professional. [place] [address] any warranty or liability for your use of this information.

## 2020-01-17 NOTE — ED PROVIDER NOTES
EMERGENCY DEPARTMENT HISTORY AND PHYSICAL EXAM    10:16 AM      Date: 1/17/2020  Patient Name: Antoinette Clark    History of Presenting Illness     Chief Complaint   Patient presents with    COPD         History Provided By: Patient    Additional History (Context): Antoinette Clark is a 53 y.o. female with Past medical history of CHF, COPD, asthma, hypertension, bowel obstruction, hypercholesterolemia who presents with severe shortness of breath worsening over the past few days. Associated symptoms are wheezing, productive cough, chest tightness associated with shortness of breath and cough. Patient states she continues with not being able to lay flat when sleeping and uses several pillows. She states that this has been ongoing for a long time and not a new problem. She states she is on 3 L of oxygen at home all the time. She reports that she has not been on prednisone for the past couple weeks. She is using her albuterol and nebulizer with minimal relief. No radiating chest pain, sweating, abdominal pain, nausea, vomiting, diarrhea and no other complaint. PCP: Bal Riggs NP        Past History     Past Medical History:  Past Medical History:   Diagnosis Date    Asthma     CHF (congestive heart failure) (HCC)     Chronic obstructive pulmonary disease (HCC)     Dependence on supplemental oxygen     Endocrine disease     thyroid issues    Gastrointestinal disorder     \"blockage in my stomach\"    Hypercholesterolemia     Hypertension        Past Surgical History:  Past Surgical History:   Procedure Laterality Date    HX GI      Exploratory laparotomy with lysis of adhesions and primary repair of incarcerated umbilical hernia       Family History:  History reviewed. No pertinent family history.     Social History:  Social History     Tobacco Use    Smoking status: Current Every Day Smoker     Packs/day: 0.25    Smokeless tobacco: Former User   Substance Use Topics    Alcohol use: No     Comment: socially    Drug use: Not Currently     Types: Heroin       Allergies:  No Known Allergies      Review of Systems       Review of Systems   Constitutional: Negative for chills and fever. HENT: Positive for congestion. Negative for rhinorrhea, sore throat and trouble swallowing. Eyes: Negative for visual disturbance. Respiratory: Positive for cough, chest tightness, shortness of breath and wheezing. Cardiovascular: Positive for chest pain and leg swelling. Gastrointestinal: Negative for abdominal pain, nausea and vomiting. Endocrine: Negative for polyuria. Genitourinary: Negative for dysuria. Musculoskeletal: Negative for arthralgias and neck stiffness. Skin: Negative for pallor and rash. Neurological: Negative for dizziness, weakness, numbness and headaches. Hematological: Does not bruise/bleed easily. Psychiatric/Behavioral: Negative for confusion and dysphoric mood. All other systems reviewed and are negative. Physical Exam     Visit Vitals  BP (!) 229/94 (BP 1 Location: Right arm, BP Patient Position: At rest)   Pulse 75   Temp 97.6 °F (36.4 °C)   Resp 26   Ht 4' 11\" (1.499 m)   Wt 74.8 kg (165 lb)   LMP 04/14/2009   SpO2 100%   BMI 33.33 kg/m²         Physical Exam  Vitals signs and nursing note reviewed. Constitutional:       General: She is in acute distress. Appearance: She is well-developed. She is not diaphoretic. HENT:      Head: Normocephalic and atraumatic. Nose: Nose normal.      Mouth/Throat:      Mouth: Mucous membranes are moist.   Eyes:      General: No scleral icterus. Conjunctiva/sclera: Conjunctivae normal.      Pupils: Pupils are equal, round, and reactive to light. Neck:      Musculoskeletal: Normal range of motion and neck supple. Cardiovascular:      Rate and Rhythm: Normal rate and regular rhythm. Heart sounds: Normal heart sounds.       Comments: Capillary refill < 3 seconds  Pulmonary:      Effort: Tachypnea and respiratory distress present. Breath sounds: Examination of the right-lower field reveals rhonchi. Examination of the left-lower field reveals rhonchi. Rhonchi present. No wheezing. Comments: Diminished lung sounds bilaterally  Faint rhonchi at the bases  Using accessory abdominal muscles with breathing  Intermittent very short sentences  Abdominal:      General: Bowel sounds are normal. There is no distension. Palpations: Abdomen is soft. Tenderness: There is no tenderness. Musculoskeletal: Normal range of motion. General: No tenderness. Right lower leg: No edema. Left lower leg: No edema. Comments:   No calf tenderness   Lymphadenopathy:      Cervical: No cervical adenopathy. Skin:     General: Skin is warm and dry. Coloration: Skin is not pale. Findings: No erythema. Neurological:      Mental Status: She is alert and oriented to person, place, and time. Cranial Nerves: No cranial nerve deficit. Motor: No weakness. Psychiatric:         Thought Content:  Thought content normal.           Diagnostic Study Results     Labs -  Recent Results (from the past 12 hour(s))   EKG, 12 LEAD, INITIAL    Collection Time: 01/17/20 10:09 AM   Result Value Ref Range    Ventricular Rate 76 BPM    Atrial Rate 76 BPM    P-R Interval 130 ms    QRS Duration 124 ms    Q-T Interval 452 ms    QTC Calculation (Bezet) 508 ms    Calculated P Axis 73 degrees    Calculated R Axis -36 degrees    Calculated T Axis 79 degrees    Diagnosis       Normal sinus rhythm  Possible Left atrial enlargement  Left axis deviation  Left bundle branch block  Abnormal ECG  When compared with ECG of 20-DEC-2019 13:58,  premature ventricular complexes are no longer present  Left bundle branch block is now present     CBC WITH AUTOMATED DIFF    Collection Time: 01/17/20  3:09 PM   Result Value Ref Range    WBC 5.7 4.6 - 13.2 K/uL    RBC 5.02 4.20 - 5.30 M/uL    HGB 14.2 12.0 - 16.0 g/dL    HCT 44.1 35.0 - 45.0 %    MCV 87.8 74.0 - 97.0 FL    MCH 28.3 24.0 - 34.0 PG    MCHC 32.2 31.0 - 37.0 g/dL    RDW 14.1 11.6 - 14.5 %    PLATELET 331 361 - 161 K/uL    MPV 12.7 (H) 9.2 - 11.8 FL    NEUTROPHILS 63 40 - 73 %    LYMPHOCYTES 29 21 - 52 %    MONOCYTES 6 3 - 10 %    EOSINOPHILS 2 0 - 5 %    BASOPHILS 0 0 - 2 %    ABS. NEUTROPHILS 3.6 1.8 - 8.0 K/UL    ABS. LYMPHOCYTES 1.6 0.9 - 3.6 K/UL    ABS. MONOCYTES 0.4 0.05 - 1.2 K/UL    ABS. EOSINOPHILS 0.1 0.0 - 0.4 K/UL    ABS. BASOPHILS 0.0 0.0 - 0.1 K/UL    DF AUTOMATED     METABOLIC PANEL, BASIC    Collection Time: 01/17/20  3:09 PM   Result Value Ref Range    Sodium 137 136 - 145 mmol/L    Potassium 5.1 3.5 - 5.5 mmol/L    Chloride 104 100 - 111 mmol/L    CO2 33 (H) 21 - 32 mmol/L    Anion gap 0 (L) 3.0 - 18 mmol/L    Glucose 76 74 - 99 mg/dL    BUN 14 7.0 - 18 MG/DL    Creatinine 0.93 0.6 - 1.3 MG/DL    BUN/Creatinine ratio 15 12 - 20      GFR est AA >60 >60 ml/min/1.73m2    GFR est non-AA >60 >60 ml/min/1.73m2    Calcium 8.9 8.5 - 10.1 MG/DL   CARDIAC PANEL,(CK, CKMB & TROPONIN)    Collection Time: 01/17/20  3:09 PM   Result Value Ref Range     (H) 26 - 192 U/L    CK - MB 1.1 <3.6 ng/ml    CK-MB Index 0.4 0.0 - 4.0 %    Troponin-I, QT <0.02 0.0 - 0.045 NG/ML       Radiologic Studies -   XR CHEST PA LAT   Final Result   IMPRESSION:   No evidence of acute cardiopulmonary process. Medical Decision Making   I am the first provider for this patient. I reviewed the vital signs, available nursing notes, past medical history, past surgical history, family history and social history. Vital Signs-Reviewed the patient's vital signs. EKG: Interpreted by the EP Dr. Ramandeep Clarke.    Time Interpreted: 10:11 AM   Rate: 76   Rhythm: Normal Sinus Rhythm    Interpretation: Prolonged QTC, left axis deviation, left bundle branch block, no ST elevation, no ST depressions   Comparison: 7/26/2019 EKG shows old left bundle branch block    Records Reviewed: Nursing Notes and Old Medical Records (Time of Review: 10:16 AM)    Provider Notes (Medical Decision Making): DDX: COPD exacerbation, pneumonia, CHF, metabolic, URI    Patient with respiratory distress  Get labs, chest x-ray, EKG, steroids, Lasix    O2 100% on 3 L of O2 which she has with her portable. Heart rate normal at 75    MDM    Medications   albuterol-ipratropium (DUO-NEB) 2.5 MG-0.5 MG/3 ML (has no administration in time range)   albuterol-ipratropium (DUO-NEB) 2.5 MG-0.5 MG/3 ML (3 mL Nebulization Given 1/17/20 1557)   albuterol-ipratropium (DUO-NEB) 2.5 MG-0.5 MG/3 ML (3 mL Nebulization Given 1/17/20 1557)   predniSONE (DELTASONE) tablet 60 mg (60 mg Oral Given 1/17/20 1556)   methocarbamol (ROBAXIN) tablet 750 mg (750 mg Oral Given 1/17/20 1556)         ED Course: Progress Notes, Reevaluation, and Consults:  Patient very difficult to get IV access on, multiple attempts. Was going to attempt to do an EJ which she refused to allow me to do that. She states that staff can try again    Patient feels a little better after breathing treatment but still short of breath    Patient with elevated blood pressure, but asymptomatic. We will have her get repeat blood pressure check with PCP. Told her to take BP her medicine as prescribed. She agrees. Nothing acute on her chest x-ray    Symptoms consistent with COPD exacerbation    Labs reassuring    I have reassessed the patient. I have discussed the workup, results and plan with the patient and patient is in agreement. Patient is feeling better. Patient will be prescribed prednisone, doxycycline, Tessalon. Patient was discharge in stable condition. Patient was given outpatient follow up. Patient is to return to emergency department if any new or worsening condition. Diagnosis     Clinical Impression:   1.  Acute exacerbation of chronic obstructive pulmonary disease (COPD) (HCC)    2. Elevated blood pressure reading with diagnosis of hypertension        Disposition: Discharged    Follow-up Information     Follow up With Specialties Details Why Contact Info    Sam Dye NP Nurse Practitioner Schedule an appointment as soon as possible for a visit in 2 days Get repeat blood pressure check [address] [place] [address]  [phone]      [facility] EMERGENCY DEPT Emergency Medicine  As needed, If symptoms worsen [address] [address]  [phone]           Patient's Medications   Start Taking    BENZONATATE (TESSALON PERLES) 100 MG CAPSULE    Take 2 Caps by mouth three (3) times daily as needed for Cough for up to 7 days. DOXYCYCLINE (VIBRA-TABS) 100 MG TABLET    Take 1 Tab by mouth two (2) times a day for 10 days. METHOCARBAMOL (ROBAXIN) 500 MG TABLET    Take 1 Tab by mouth two (2) times daily as needed (Musculoskeletal pain; muscle spasms) for up to 5 days. PREDNISONE (DELTASONE) 20 MG TABLET    Take 40 mg by mouth daily for 4 days. Start this medication on Saturday, 1/18/2020. Take with Breakfast   Continue Taking    ALBUTEROL (ACCUNEB) 1.25 MG/3 ML NEBU    Take 3 mL by inhalation every four (4) hours as needed for Wheezing (wheezing). AMLODIPINE (NORVASC) 10 MG TABLET    Take 1 Tab by mouth daily. ARFORMOTEROL (BROVANA) 15 MCG/2 ML NEBU NEB SOLUTION    2 mL by Nebulization route two (2) times a day. ATORVASTATIN (LIPITOR) 20 MG TABLET    Take 1 Tab by mouth nightly. BUDESONIDE (PULMICORT) 0.5 MG/2 ML NBSP    2 mL by Nebulization route two (2) times a day. BUDESONIDE-FORMOTEROL (SYMBICORT) 160-4.5 MCG/ACTUATION HFAA    Take 2 Puffs by inhalation two (2) times a day. CIPROFLOXACIN HCL (CIPRO) 250 MG TABLET    Take 2 Tabs by mouth two (2) times a day. Indications: Bacterial Urinary Tract Infection    CLONIDINE HCL (CATAPRES) 0.2 MG TABLET    Take 1 Tab by mouth two (2) times a day. DOCUSATE SODIUM (COLACE) 100 MG CAPSULE    Take 1 Cap by mouth two (2) times a day.     ESCITALOPRAM OXALATE (LEXAPRO) 10 MG TABLET    Take 1 Tab by mouth every evening. FAMOTIDINE (PEPCID) 20 MG TABLET    Take 1 Tab by mouth daily. FLUTICASONE FUROATE-VILANTEROL (BREO ELLIPTA) 200-25 MCG/DOSE INHALER    Take 1 Puff by inhalation daily. HYDROCHLOROTHIAZIDE (HYDRODIURIL) 50 MG TABLET    Take 25 mg by mouth daily. IBUPROFEN (MOTRIN) 800 MG TABLET    Take 1 Tab by mouth three (3) times daily as needed for Pain. LISINOPRIL (PRINIVIL, ZESTRIL) 20 MG TABLET    Take 20 mg by mouth daily. METOPROLOL TARTRATE (LOPRESSOR) 25 MG TABLET    Take 0.5 Tabs by mouth every twelve (12) hours. MONTELUKAST (SINGULAIR) 10 MG TABLET    Take 1 Tab by mouth nightly. NALOXONE (NARCAN) 4 MG/ACTUATION NASAL SPRAY    Use 1 spray intranasally, then discard. Repeat with new spray every 2 min as needed for opioid overdose symptoms, alternating nostrils. ONDANSETRON (ZOFRAN ODT) 4 MG DISINTEGRATING TABLET    Take 1 Tab by mouth every eight (8) hours as needed for Nausea. Indications: prevent nausea and vomiting after surgery    OTHER    Incentive spirometry- use as directed    OXYGEN-AIR DELIVERY SYSTEMS    3 L by IntraNASal route as needed for Other (sob). QUETIAPINE (SEROQUEL) 300 MG TABLET    Take 300 mg by mouth nightly. Indications: States she lost her daughter    ROFLUMILAST (DALIRESP) 500 MCG TAB TABLET    Take 1 Tab by mouth daily. TIOTROPIUM (SPIRIVA) 18 MCG INHALATION CAPSULE    Take 1 Cap by inhalation daily. These Medications have changed    Modified Medication Previous Medication    ALBUTEROL (PROVENTIL HFA, VENTOLIN HFA, PROAIR HFA) 90 MCG/ACTUATION INHALER albuterol (PROVENTIL HFA, VENTOLIN HFA, PROAIR HFA) 90 mcg/actuation inhaler       Take 2 Puffs by inhalation every four (4) hours as needed for Wheezing or Shortness of Breath. Indications: asthma attack, chronic obstructive pulmonary disease    Take 2 Puffs by inhalation every four (4) hours as needed for Wheezing.    Stop Taking    ALBUTEROL (VENTOLIN HFA) 90 MCG/ACTUATION INHALER Take 2 Puffs by inhalation every four (4) hours as needed for Wheezing. AZITHROMYCIN (ZITHROMAX TRI-KIM) 500 MG TAB    Take one 500mg tablet by mouth today, and then take one 250mg tablet by mouth once daily for 4 days. Roldan Judge DO    Dragon medical dictation software was used for portions of this report. Unintended transcription errors may occur. My signature above authenticates this document and my orders, the final    diagnosis (es), discharge prescription (s), and instructions in the Epic    record.

## 2020-01-17 NOTE — ED TRIAGE NOTES
Pt c/o difficulty breathing, pt yelling and cursing at staff, pt able to speak in well developed complete sentences and ambulate without difficulty

## 2020-03-22 ENCOUNTER — APPOINTMENT (OUTPATIENT)
Dept: GENERAL RADIOLOGY | Age: 55
DRG: 190 | End: 2020-03-22
Attending: PHYSICIAN ASSISTANT
Payer: COMMERCIAL

## 2020-03-22 ENCOUNTER — HOSPITAL ENCOUNTER (OUTPATIENT)
Age: 55
Setting detail: OBSERVATION
Discharge: LEFT BEFORE DISCHARGE | DRG: 190 | End: 2020-03-23
Attending: EMERGENCY MEDICINE | Admitting: INTERNAL MEDICINE
Payer: COMMERCIAL

## 2020-03-22 DIAGNOSIS — R06.03 ACUTE RESPIRATORY DISTRESS: ICD-10-CM

## 2020-03-22 DIAGNOSIS — I21.4 NSTEMI (NON-ST ELEVATED MYOCARDIAL INFARCTION) (HCC): Primary | ICD-10-CM

## 2020-03-22 DIAGNOSIS — I24.9 ACS (ACUTE CORONARY SYNDROME) (HCC): ICD-10-CM

## 2020-03-22 PROBLEM — F14.10 COCAINE ABUSE (HCC): Status: ACTIVE | Noted: 2017-11-26

## 2020-03-22 PROBLEM — F19.10 POLYSUBSTANCE ABUSE (HCC): Status: ACTIVE | Noted: 2018-09-15

## 2020-03-22 PROBLEM — F32.A DEPRESSION: Status: ACTIVE | Noted: 2020-03-22

## 2020-03-22 PROBLEM — E78.5 HLD (HYPERLIPIDEMIA): Status: ACTIVE | Noted: 2018-09-15

## 2020-03-22 LAB
ALBUMIN SERPL-MCNC: 3.5 G/DL (ref 3.4–5)
ALBUMIN/GLOB SERPL: 0.9 {RATIO} (ref 0.8–1.7)
ALP SERPL-CCNC: 97 U/L (ref 45–117)
ALT SERPL-CCNC: 20 U/L (ref 13–56)
ANION GAP SERPL CALC-SCNC: 5 MMOL/L (ref 3–18)
APTT PPP: 30.4 SEC (ref 23–36.4)
AST SERPL-CCNC: 16 U/L (ref 10–38)
BASOPHILS # BLD: 0 K/UL (ref 0–0.1)
BASOPHILS NFR BLD: 0 % (ref 0–2)
BILIRUB SERPL-MCNC: 0.8 MG/DL (ref 0.2–1)
BNP SERPL-MCNC: ABNORMAL PG/ML (ref 0–900)
BUN SERPL-MCNC: 16 MG/DL (ref 7–18)
BUN/CREAT SERPL: 14 (ref 12–20)
CALCIUM SERPL-MCNC: 8.9 MG/DL (ref 8.5–10.1)
CHLORIDE SERPL-SCNC: 106 MMOL/L (ref 100–111)
CO2 SERPL-SCNC: 28 MMOL/L (ref 21–32)
CREAT SERPL-MCNC: 1.15 MG/DL (ref 0.6–1.3)
DIFFERENTIAL METHOD BLD: NORMAL
EOSINOPHIL # BLD: 0.1 K/UL (ref 0–0.4)
EOSINOPHIL NFR BLD: 2 % (ref 0–5)
ERYTHROCYTE [DISTWIDTH] IN BLOOD BY AUTOMATED COUNT: 13.2 % (ref 11.6–14.5)
GLOBULIN SER CALC-MCNC: 3.8 G/DL (ref 2–4)
GLUCOSE SERPL-MCNC: 116 MG/DL (ref 74–99)
HCT VFR BLD AUTO: 39.2 % (ref 35–45)
HGB BLD-MCNC: 13 G/DL (ref 12–16)
LYMPHOCYTES # BLD: 1.5 K/UL (ref 0.9–3.6)
LYMPHOCYTES NFR BLD: 22 % (ref 21–52)
MCH RBC QN AUTO: 27.8 PG (ref 24–34)
MCHC RBC AUTO-ENTMCNC: 33.2 G/DL (ref 31–37)
MCV RBC AUTO: 83.9 FL (ref 74–97)
MONOCYTES # BLD: 0.4 K/UL (ref 0.05–1.2)
MONOCYTES NFR BLD: 6 % (ref 3–10)
NEUTS SEG # BLD: 4.8 K/UL (ref 1.8–8)
NEUTS SEG NFR BLD: 70 % (ref 40–73)
PLATELET # BLD AUTO: 197 K/UL (ref 135–420)
PMV BLD AUTO: 10.3 FL (ref 9.2–11.8)
POTASSIUM SERPL-SCNC: 3.2 MMOL/L (ref 3.5–5.5)
PROT SERPL-MCNC: 7.3 G/DL (ref 6.4–8.2)
RBC # BLD AUTO: 4.67 M/UL (ref 4.2–5.3)
SODIUM SERPL-SCNC: 139 MMOL/L (ref 136–145)
TROPONIN I SERPL-MCNC: 0.68 NG/ML (ref 0–0.04)
WBC # BLD AUTO: 6.9 K/UL (ref 4.6–13.2)

## 2020-03-22 PROCEDURE — 71045 X-RAY EXAM CHEST 1 VIEW: CPT

## 2020-03-22 PROCEDURE — 96375 TX/PRO/DX INJ NEW DRUG ADDON: CPT

## 2020-03-22 PROCEDURE — 80053 COMPREHEN METABOLIC PANEL: CPT

## 2020-03-22 PROCEDURE — 99285 EMERGENCY DEPT VISIT HI MDM: CPT

## 2020-03-22 PROCEDURE — 85730 THROMBOPLASTIN TIME PARTIAL: CPT

## 2020-03-22 PROCEDURE — 84484 ASSAY OF TROPONIN QUANT: CPT

## 2020-03-22 PROCEDURE — 96374 THER/PROPH/DIAG INJ IV PUSH: CPT

## 2020-03-22 PROCEDURE — 65660000000 HC RM CCU STEPDOWN

## 2020-03-22 PROCEDURE — 85025 COMPLETE CBC W/AUTO DIFF WBC: CPT

## 2020-03-22 PROCEDURE — 93005 ELECTROCARDIOGRAM TRACING: CPT

## 2020-03-22 PROCEDURE — 74011250636 HC RX REV CODE- 250/636: Performed by: PHYSICIAN ASSISTANT

## 2020-03-22 PROCEDURE — 65270000029 HC RM PRIVATE

## 2020-03-22 PROCEDURE — 74011250636 HC RX REV CODE- 250/636

## 2020-03-22 PROCEDURE — 74011000250 HC RX REV CODE- 250: Performed by: PHYSICIAN ASSISTANT

## 2020-03-22 PROCEDURE — 83880 ASSAY OF NATRIURETIC PEPTIDE: CPT

## 2020-03-22 PROCEDURE — 74011250637 HC RX REV CODE- 250/637: Performed by: PHYSICIAN ASSISTANT

## 2020-03-22 RX ORDER — ASPIRIN 325 MG
325 TABLET ORAL
Status: COMPLETED | OUTPATIENT
Start: 2020-03-22 | End: 2020-03-22

## 2020-03-22 RX ORDER — POTASSIUM CHLORIDE 20 MEQ/1
40 TABLET, EXTENDED RELEASE ORAL
Status: COMPLETED | OUTPATIENT
Start: 2020-03-22 | End: 2020-03-22

## 2020-03-22 RX ORDER — MORPHINE SULFATE 4 MG/ML
4 INJECTION, SOLUTION INTRAMUSCULAR; INTRAVENOUS ONCE
Status: COMPLETED | OUTPATIENT
Start: 2020-03-22 | End: 2020-03-22

## 2020-03-22 RX ORDER — HEPARIN SODIUM 1000 [USP'U]/ML
4000 INJECTION, SOLUTION INTRAVENOUS; SUBCUTANEOUS ONCE
Status: COMPLETED | OUTPATIENT
Start: 2020-03-22 | End: 2020-03-23

## 2020-03-22 RX ORDER — HEPARIN SODIUM 10000 [USP'U]/100ML
12-25 INJECTION, SOLUTION INTRAVENOUS
Status: DISCONTINUED | OUTPATIENT
Start: 2020-03-22 | End: 2020-03-23 | Stop reason: HOSPADM

## 2020-03-22 RX ORDER — MORPHINE SULFATE 4 MG/ML
4 INJECTION INTRAVENOUS
Status: DISCONTINUED | OUTPATIENT
Start: 2020-03-22 | End: 2020-03-22 | Stop reason: CLARIF

## 2020-03-22 RX ORDER — ONDANSETRON 2 MG/ML
4 INJECTION INTRAMUSCULAR; INTRAVENOUS
Status: COMPLETED | OUTPATIENT
Start: 2020-03-22 | End: 2020-03-23

## 2020-03-22 RX ORDER — MORPHINE SULFATE 4 MG/ML
INJECTION, SOLUTION INTRAMUSCULAR; INTRAVENOUS
Status: COMPLETED
Start: 2020-03-22 | End: 2020-03-22

## 2020-03-22 RX ORDER — IPRATROPIUM BROMIDE AND ALBUTEROL SULFATE 2.5; .5 MG/3ML; MG/3ML
3 SOLUTION RESPIRATORY (INHALATION)
Status: COMPLETED | OUTPATIENT
Start: 2020-03-22 | End: 2020-03-22

## 2020-03-22 RX ADMIN — MORPHINE SULFATE 4 MG: 4 INJECTION, SOLUTION INTRAMUSCULAR; INTRAVENOUS at 23:31

## 2020-03-22 RX ADMIN — ONDANSETRON 4 MG: 2 INJECTION INTRAMUSCULAR; INTRAVENOUS at 23:30

## 2020-03-22 RX ADMIN — IPRATROPIUM BROMIDE AND ALBUTEROL SULFATE 3 ML: .5; 3 SOLUTION RESPIRATORY (INHALATION) at 22:58

## 2020-03-22 RX ADMIN — METHYLPREDNISOLONE SODIUM SUCCINATE 125 MG: 125 INJECTION, POWDER, FOR SOLUTION INTRAMUSCULAR; INTRAVENOUS at 23:01

## 2020-03-22 RX ADMIN — POTASSIUM CHLORIDE 40 MEQ: 1500 TABLET, EXTENDED RELEASE ORAL at 23:29

## 2020-03-22 RX ADMIN — ASPIRIN 325 MG ORAL TABLET 325 MG: 325 PILL ORAL at 23:59

## 2020-03-23 ENCOUNTER — APPOINTMENT (OUTPATIENT)
Dept: CT IMAGING | Age: 55
DRG: 190 | End: 2020-03-23
Attending: INTERNAL MEDICINE
Payer: COMMERCIAL

## 2020-03-23 ENCOUNTER — APPOINTMENT (OUTPATIENT)
Dept: GENERAL RADIOLOGY | Age: 55
DRG: 190 | End: 2020-03-23
Attending: INTERNAL MEDICINE
Payer: COMMERCIAL

## 2020-03-23 ENCOUNTER — APPOINTMENT (OUTPATIENT)
Dept: GENERAL RADIOLOGY | Age: 55
DRG: 190 | End: 2020-03-23
Attending: EMERGENCY MEDICINE
Payer: COMMERCIAL

## 2020-03-23 ENCOUNTER — HOSPITAL ENCOUNTER (INPATIENT)
Age: 55
LOS: 3 days | Discharge: LEFT BEFORE DISCHARGE | DRG: 190 | End: 2020-03-26
Attending: EMERGENCY MEDICINE | Admitting: INTERNAL MEDICINE
Payer: COMMERCIAL

## 2020-03-23 VITALS
OXYGEN SATURATION: 97 % | RESPIRATION RATE: 20 BRPM | BODY MASS INDEX: 31.71 KG/M2 | HEART RATE: 88 BPM | WEIGHT: 157 LBS | DIASTOLIC BLOOD PRESSURE: 81 MMHG | SYSTOLIC BLOOD PRESSURE: 157 MMHG | TEMPERATURE: 97.9 F

## 2020-03-23 DIAGNOSIS — K56.609 SMALL BOWEL OBSTRUCTION (HCC): ICD-10-CM

## 2020-03-23 DIAGNOSIS — K43.0 INCARCERATED INCISIONAL HERNIA: ICD-10-CM

## 2020-03-23 DIAGNOSIS — I50.9 CONGESTIVE HEART FAILURE, UNSPECIFIED HF CHRONICITY, UNSPECIFIED HEART FAILURE TYPE (HCC): Primary | ICD-10-CM

## 2020-03-23 DIAGNOSIS — I46.9 CARDIAC ARREST (HCC): ICD-10-CM

## 2020-03-23 DIAGNOSIS — I21.4 NSTEMI (NON-ST ELEVATED MYOCARDIAL INFARCTION) (HCC): ICD-10-CM

## 2020-03-23 DIAGNOSIS — R11.2 NAUSEA AND VOMITING, INTRACTABILITY OF VOMITING NOT SPECIFIED, UNSPECIFIED VOMITING TYPE: ICD-10-CM

## 2020-03-23 DIAGNOSIS — R06.02 SOB (SHORTNESS OF BREATH): ICD-10-CM

## 2020-03-23 PROBLEM — E87.6 HYPOKALEMIA: Status: ACTIVE | Noted: 2020-03-23

## 2020-03-23 PROBLEM — J45.901 ACUTE EXACERBATION OF COPD WITH ASTHMA (HCC): Status: ACTIVE | Noted: 2017-03-02

## 2020-03-23 LAB
ANION GAP SERPL CALC-SCNC: 7 MMOL/L (ref 3–18)
APTT PPP: 28 SEC (ref 23–36.4)
APTT PPP: 90.6 SEC (ref 23–36.4)
ARTERIAL PATENCY WRIST A: YES
ATRIAL RATE: 80 BPM
ATRIAL RATE: 83 BPM
ATRIAL RATE: 90 BPM
BASE EXCESS BLD CALC-SCNC: 1 MMOL/L
BASOPHILS # BLD: 0 K/UL (ref 0–0.1)
BASOPHILS # BLD: 0 K/UL (ref 0–0.1)
BASOPHILS NFR BLD: 0 % (ref 0–2)
BASOPHILS NFR BLD: 0 % (ref 0–2)
BDY SITE: ABNORMAL
BNP SERPL-MCNC: ABNORMAL PG/ML (ref 0–900)
BUN SERPL-MCNC: 16 MG/DL (ref 7–18)
BUN/CREAT SERPL: 14 (ref 12–20)
CALCIUM SERPL-MCNC: 9.7 MG/DL (ref 8.5–10.1)
CALCULATED P AXIS, ECG09: 67 DEGREES
CALCULATED P AXIS, ECG09: 68 DEGREES
CALCULATED P AXIS, ECG09: 70 DEGREES
CALCULATED R AXIS, ECG10: -48 DEGREES
CALCULATED R AXIS, ECG10: -51 DEGREES
CALCULATED R AXIS, ECG10: -56 DEGREES
CALCULATED T AXIS, ECG11: -136 DEGREES
CALCULATED T AXIS, ECG11: 131 DEGREES
CALCULATED T AXIS, ECG11: 172 DEGREES
CHLORIDE SERPL-SCNC: 103 MMOL/L (ref 100–111)
CK MB CFR SERPL CALC: 3.2 % (ref 0–4)
CK MB SERPL-MCNC: 2.6 NG/ML (ref 5–25)
CK SERPL-CCNC: 82 U/L (ref 26–192)
CO2 SERPL-SCNC: 28 MMOL/L (ref 21–32)
CREAT SERPL-MCNC: 1.15 MG/DL (ref 0.6–1.3)
CRP SERPL-MCNC: 6.5 MG/DL (ref 0–0.3)
DIAGNOSIS, 93000: NORMAL
DIFFERENTIAL METHOD BLD: ABNORMAL
DIFFERENTIAL METHOD BLD: ABNORMAL
EOSINOPHIL # BLD: 0 K/UL (ref 0–0.4)
EOSINOPHIL # BLD: 0 K/UL (ref 0–0.4)
EOSINOPHIL NFR BLD: 0 % (ref 0–5)
EOSINOPHIL NFR BLD: 0 % (ref 0–5)
ERYTHROCYTE [DISTWIDTH] IN BLOOD BY AUTOMATED COUNT: 13.1 % (ref 11.6–14.5)
ERYTHROCYTE [DISTWIDTH] IN BLOOD BY AUTOMATED COUNT: 13.8 % (ref 11.6–14.5)
FERRITIN SERPL-MCNC: 252 NG/ML (ref 8–388)
FLUAV AG NPH QL IA: NEGATIVE
FLUBV AG NOSE QL IA: NEGATIVE
GAS FLOW.O2 O2 DELIVERY SYS: ABNORMAL L/MIN
GLUCOSE BLD STRIP.AUTO-MCNC: 201 MG/DL (ref 70–110)
GLUCOSE SERPL-MCNC: 151 MG/DL (ref 74–99)
HCO3 BLD-SCNC: 26.7 MMOL/L (ref 22–26)
HCT VFR BLD AUTO: 40.1 % (ref 35–45)
HCT VFR BLD AUTO: 43.4 % (ref 35–45)
HGB BLD-MCNC: 13.2 G/DL (ref 12–16)
HGB BLD-MCNC: 14.6 G/DL (ref 12–16)
LACTATE SERPL-SCNC: 0.8 MMOL/L (ref 0.4–2)
LDH SERPL L TO P-CCNC: 223 U/L (ref 81–234)
LIPASE SERPL-CCNC: 35 U/L (ref 73–393)
LYMPHOCYTES # BLD: 0.8 K/UL (ref 0.9–3.6)
LYMPHOCYTES # BLD: 1.4 K/UL (ref 0.9–3.6)
LYMPHOCYTES NFR BLD: 11 % (ref 21–52)
LYMPHOCYTES NFR BLD: 16 % (ref 21–52)
MCH RBC QN AUTO: 27.7 PG (ref 24–34)
MCH RBC QN AUTO: 27.8 PG (ref 24–34)
MCHC RBC AUTO-ENTMCNC: 32.9 G/DL (ref 31–37)
MCHC RBC AUTO-ENTMCNC: 33.6 G/DL (ref 31–37)
MCV RBC AUTO: 82.7 FL (ref 74–97)
MCV RBC AUTO: 84.2 FL (ref 74–97)
MONOCYTES # BLD: 0 K/UL (ref 0.05–1.2)
MONOCYTES # BLD: 0.2 K/UL (ref 0.05–1.2)
MONOCYTES NFR BLD: 1 % (ref 3–10)
MONOCYTES NFR BLD: 3 % (ref 3–10)
NEUTS SEG # BLD: 5.9 K/UL (ref 1.8–8)
NEUTS SEG # BLD: 7.1 K/UL (ref 1.8–8)
NEUTS SEG NFR BLD: 81 % (ref 40–73)
NEUTS SEG NFR BLD: 88 % (ref 40–73)
P-R INTERVAL, ECG05: 134 MS
P-R INTERVAL, ECG05: 138 MS
P-R INTERVAL, ECG05: 160 MS
PCO2 BLD: 46.1 MMHG (ref 35–45)
PH BLD: 7.37 [PH] (ref 7.35–7.45)
PLATELET # BLD AUTO: 229 K/UL (ref 135–420)
PLATELET # BLD AUTO: 263 K/UL (ref 135–420)
PMV BLD AUTO: 10.8 FL (ref 9.2–11.8)
PMV BLD AUTO: 12.7 FL (ref 9.2–11.8)
PO2 BLD: 62 MMHG (ref 80–100)
POTASSIUM SERPL-SCNC: 3.8 MMOL/L (ref 3.5–5.5)
PROCALCITONIN SERPL-MCNC: 0.06 NG/ML
Q-T INTERVAL, ECG07: 396 MS
Q-T INTERVAL, ECG07: 406 MS
Q-T INTERVAL, ECG07: 516 MS
QRS DURATION, ECG06: 128 MS
QRS DURATION, ECG06: 140 MS
QRS DURATION, ECG06: 86 MS
QTC CALCULATION (BEZET), ECG08: 465 MS
QTC CALCULATION (BEZET), ECG08: 496 MS
QTC CALCULATION (BEZET), ECG08: 595 MS
RBC # BLD AUTO: 4.76 M/UL (ref 4.2–5.3)
RBC # BLD AUTO: 5.25 M/UL (ref 4.2–5.3)
SAO2 % BLD: 91 % (ref 92–97)
SERVICE CMNT-IMP: ABNORMAL
SODIUM SERPL-SCNC: 138 MMOL/L (ref 136–145)
SPECIMEN TYPE: ABNORMAL
TROPONIN I SERPL-MCNC: 0.2 NG/ML (ref 0–0.04)
TROPONIN I SERPL-MCNC: 0.42 NG/ML (ref 0–0.04)
VENTRICULAR RATE, ECG03: 80 BPM
VENTRICULAR RATE, ECG03: 83 BPM
VENTRICULAR RATE, ECG03: 90 BPM
WBC # BLD AUTO: 6.7 K/UL (ref 4.6–13.2)
WBC # BLD AUTO: 8.7 K/UL (ref 4.6–13.2)

## 2020-03-23 PROCEDURE — 74011250636 HC RX REV CODE- 250/636: Performed by: PHYSICIAN ASSISTANT

## 2020-03-23 PROCEDURE — 65270000029 HC RM PRIVATE

## 2020-03-23 PROCEDURE — 85730 THROMBOPLASTIN TIME PARTIAL: CPT

## 2020-03-23 PROCEDURE — 82728 ASSAY OF FERRITIN: CPT

## 2020-03-23 PROCEDURE — 74011250636 HC RX REV CODE- 250/636: Performed by: INTERNAL MEDICINE

## 2020-03-23 PROCEDURE — 84484 ASSAY OF TROPONIN QUANT: CPT

## 2020-03-23 PROCEDURE — 83880 ASSAY OF NATRIURETIC PEPTIDE: CPT

## 2020-03-23 PROCEDURE — 80048 BASIC METABOLIC PNL TOTAL CA: CPT

## 2020-03-23 PROCEDURE — 74011250637 HC RX REV CODE- 250/637: Performed by: INTERNAL MEDICINE

## 2020-03-23 PROCEDURE — 94640 AIRWAY INHALATION TREATMENT: CPT

## 2020-03-23 PROCEDURE — 74011636320 HC RX REV CODE- 636/320: Performed by: INTERNAL MEDICINE

## 2020-03-23 PROCEDURE — 82962 GLUCOSE BLOOD TEST: CPT

## 2020-03-23 PROCEDURE — 36600 WITHDRAWAL OF ARTERIAL BLOOD: CPT

## 2020-03-23 PROCEDURE — 77030008771 HC TU NG SALEM SUMP -A

## 2020-03-23 PROCEDURE — 74011000250 HC RX REV CODE- 250: Performed by: INTERNAL MEDICINE

## 2020-03-23 PROCEDURE — 93005 ELECTROCARDIOGRAM TRACING: CPT

## 2020-03-23 PROCEDURE — 74011000250 HC RX REV CODE- 250: Performed by: PHYSICIAN ASSISTANT

## 2020-03-23 PROCEDURE — 74018 RADEX ABDOMEN 1 VIEW: CPT

## 2020-03-23 PROCEDURE — 74011250637 HC RX REV CODE- 250/637: Performed by: EMERGENCY MEDICINE

## 2020-03-23 PROCEDURE — 85025 COMPLETE CBC W/AUTO DIFF WBC: CPT

## 2020-03-23 PROCEDURE — 74177 CT ABD & PELVIS W/CONTRAST: CPT

## 2020-03-23 PROCEDURE — 86140 C-REACTIVE PROTEIN: CPT

## 2020-03-23 PROCEDURE — 83615 LACTATE (LD) (LDH) ENZYME: CPT

## 2020-03-23 PROCEDURE — 71045 X-RAY EXAM CHEST 1 VIEW: CPT

## 2020-03-23 PROCEDURE — 84145 PROCALCITONIN (PCT): CPT

## 2020-03-23 PROCEDURE — 99218 HC RM OBSERVATION: CPT

## 2020-03-23 PROCEDURE — 83690 ASSAY OF LIPASE: CPT

## 2020-03-23 PROCEDURE — 82550 ASSAY OF CK (CPK): CPT

## 2020-03-23 PROCEDURE — 87804 INFLUENZA ASSAY W/OPTIC: CPT

## 2020-03-23 PROCEDURE — 82803 BLOOD GASES ANY COMBINATION: CPT

## 2020-03-23 PROCEDURE — 74011250636 HC RX REV CODE- 250/636: Performed by: EMERGENCY MEDICINE

## 2020-03-23 PROCEDURE — 83605 ASSAY OF LACTIC ACID: CPT

## 2020-03-23 PROCEDURE — 74011250636 HC RX REV CODE- 250/636

## 2020-03-23 PROCEDURE — 74011000250 HC RX REV CODE- 250: Performed by: EMERGENCY MEDICINE

## 2020-03-23 PROCEDURE — 99284 EMERGENCY DEPT VISIT MOD MDM: CPT

## 2020-03-23 PROCEDURE — 94762 N-INVAS EAR/PLS OXIMTRY CONT: CPT

## 2020-03-23 RX ORDER — LISINOPRIL 20 MG/1
20 TABLET ORAL DAILY
Status: DISCONTINUED | OUTPATIENT
Start: 2020-03-23 | End: 2020-03-23 | Stop reason: HOSPADM

## 2020-03-23 RX ORDER — AMOXICILLIN 250 MG
1 CAPSULE ORAL DAILY
Status: DISCONTINUED | OUTPATIENT
Start: 2020-03-23 | End: 2020-03-23 | Stop reason: HOSPADM

## 2020-03-23 RX ORDER — GUAIFENESIN 100 MG/5ML
81 LIQUID (ML) ORAL DAILY
Status: DISCONTINUED | OUTPATIENT
Start: 2020-03-23 | End: 2020-03-23 | Stop reason: HOSPADM

## 2020-03-23 RX ORDER — FUROSEMIDE 10 MG/ML
40 INJECTION INTRAMUSCULAR; INTRAVENOUS
Status: DISCONTINUED | OUTPATIENT
Start: 2020-03-23 | End: 2020-03-23

## 2020-03-23 RX ORDER — NITROGLYCERIN 40 MG/100ML
0-20 INJECTION INTRAVENOUS
Status: DISCONTINUED | OUTPATIENT
Start: 2020-03-23 | End: 2020-03-23 | Stop reason: HOSPADM

## 2020-03-23 RX ORDER — IPRATROPIUM BROMIDE 0.5 MG/2.5ML
0.5 SOLUTION RESPIRATORY (INHALATION) DAILY
Status: DISCONTINUED | OUTPATIENT
Start: 2020-03-24 | End: 2020-03-23

## 2020-03-23 RX ORDER — ESCITALOPRAM OXALATE 10 MG/1
10 TABLET ORAL EVERY EVENING
Status: DISCONTINUED | OUTPATIENT
Start: 2020-03-23 | End: 2020-03-23 | Stop reason: HOSPADM

## 2020-03-23 RX ORDER — FAMOTIDINE 20 MG/1
20 TABLET, FILM COATED ORAL DAILY
Status: DISCONTINUED | OUTPATIENT
Start: 2020-03-23 | End: 2020-03-23 | Stop reason: HOSPADM

## 2020-03-23 RX ORDER — IPRATROPIUM BROMIDE AND ALBUTEROL SULFATE 2.5; .5 MG/3ML; MG/3ML
3 SOLUTION RESPIRATORY (INHALATION)
Status: DISCONTINUED | OUTPATIENT
Start: 2020-03-23 | End: 2020-03-25 | Stop reason: SDUPTHER

## 2020-03-23 RX ORDER — CLONIDINE 0.3 MG/24H
1 PATCH, EXTENDED RELEASE TRANSDERMAL
Status: DISCONTINUED | OUTPATIENT
Start: 2020-03-23 | End: 2020-03-25

## 2020-03-23 RX ORDER — BUDESONIDE 0.5 MG/2ML
500 INHALANT ORAL
Status: DISCONTINUED | OUTPATIENT
Start: 2020-03-23 | End: 2020-03-23 | Stop reason: HOSPADM

## 2020-03-23 RX ORDER — ONDANSETRON 2 MG/ML
INJECTION INTRAMUSCULAR; INTRAVENOUS
Status: COMPLETED
Start: 2020-03-23 | End: 2020-03-23

## 2020-03-23 RX ORDER — ONDANSETRON 4 MG/1
4 TABLET, ORALLY DISINTEGRATING ORAL
Status: DISCONTINUED | OUTPATIENT
Start: 2020-03-23 | End: 2020-03-23 | Stop reason: HOSPADM

## 2020-03-23 RX ORDER — LORAZEPAM 2 MG/ML
1 INJECTION INTRAMUSCULAR ONCE
Status: DISCONTINUED | OUTPATIENT
Start: 2020-03-23 | End: 2020-03-23 | Stop reason: HOSPADM

## 2020-03-23 RX ORDER — FUROSEMIDE 10 MG/ML
20 INJECTION INTRAMUSCULAR; INTRAVENOUS DAILY
Status: DISCONTINUED | OUTPATIENT
Start: 2020-03-23 | End: 2020-03-23 | Stop reason: HOSPADM

## 2020-03-23 RX ORDER — PREDNISONE 20 MG/1
40 TABLET ORAL
Status: DISCONTINUED | OUTPATIENT
Start: 2020-03-24 | End: 2020-03-23

## 2020-03-23 RX ORDER — METOPROLOL TARTRATE 5 MG/5ML
2.5 INJECTION INTRAVENOUS EVERY 6 HOURS
Status: DISCONTINUED | OUTPATIENT
Start: 2020-03-24 | End: 2020-03-24

## 2020-03-23 RX ORDER — HEPARIN SODIUM 5000 [USP'U]/ML
5000 INJECTION, SOLUTION INTRAVENOUS; SUBCUTANEOUS EVERY 8 HOURS
Status: CANCELLED | OUTPATIENT
Start: 2020-03-23

## 2020-03-23 RX ORDER — METHADONE HYDROCHLORIDE 10 MG/1
60 TABLET ORAL ONCE
Status: COMPLETED | OUTPATIENT
Start: 2020-03-23 | End: 2020-03-23

## 2020-03-23 RX ORDER — SODIUM CHLORIDE 9 MG/ML
75 INJECTION, SOLUTION INTRAVENOUS CONTINUOUS
Status: DISCONTINUED | OUTPATIENT
Start: 2020-03-23 | End: 2020-03-23 | Stop reason: HOSPADM

## 2020-03-23 RX ORDER — DOCUSATE SODIUM 100 MG/1
100 CAPSULE, LIQUID FILLED ORAL 2 TIMES DAILY
Status: DISCONTINUED | OUTPATIENT
Start: 2020-03-23 | End: 2020-03-23

## 2020-03-23 RX ORDER — BUDESONIDE 0.5 MG/2ML
500 INHALANT ORAL 2 TIMES DAILY
Status: DISCONTINUED | OUTPATIENT
Start: 2020-03-23 | End: 2020-03-23

## 2020-03-23 RX ORDER — KETOROLAC TROMETHAMINE 15 MG/ML
15 INJECTION, SOLUTION INTRAMUSCULAR; INTRAVENOUS
Status: DISCONTINUED | OUTPATIENT
Start: 2020-03-23 | End: 2020-03-26 | Stop reason: HOSPADM

## 2020-03-23 RX ORDER — ARFORMOTEROL TARTRATE 15 UG/2ML
15 SOLUTION RESPIRATORY (INHALATION)
Status: DISCONTINUED | OUTPATIENT
Start: 2020-03-23 | End: 2020-03-23 | Stop reason: HOSPADM

## 2020-03-23 RX ORDER — CLONIDINE HYDROCHLORIDE 0.1 MG/1
0.2 TABLET ORAL 2 TIMES DAILY
Status: DISCONTINUED | OUTPATIENT
Start: 2020-03-23 | End: 2020-03-23 | Stop reason: HOSPADM

## 2020-03-23 RX ORDER — IPRATROPIUM BROMIDE AND ALBUTEROL SULFATE 2.5; .5 MG/3ML; MG/3ML
3 SOLUTION RESPIRATORY (INHALATION)
Status: DISCONTINUED | OUTPATIENT
Start: 2020-03-23 | End: 2020-03-23 | Stop reason: HOSPADM

## 2020-03-23 RX ORDER — HEPARIN SODIUM 10000 [USP'U]/100ML
12-25 INJECTION, SOLUTION INTRAVENOUS
Status: DISCONTINUED | OUTPATIENT
Start: 2020-03-23 | End: 2020-03-26

## 2020-03-23 RX ORDER — METOPROLOL TARTRATE 25 MG/1
12.5 TABLET, FILM COATED ORAL EVERY 12 HOURS
Status: DISCONTINUED | OUTPATIENT
Start: 2020-03-23 | End: 2020-03-23

## 2020-03-23 RX ORDER — QUETIAPINE FUMARATE 100 MG/1
300 TABLET, FILM COATED ORAL
Status: DISCONTINUED | OUTPATIENT
Start: 2020-03-23 | End: 2020-03-23 | Stop reason: HOSPADM

## 2020-03-23 RX ORDER — MORPHINE SULFATE 2 MG/ML
1 INJECTION, SOLUTION INTRAMUSCULAR; INTRAVENOUS ONCE
Status: COMPLETED | OUTPATIENT
Start: 2020-03-23 | End: 2020-03-23

## 2020-03-23 RX ORDER — MONTELUKAST SODIUM 10 MG/1
10 TABLET ORAL
Status: DISCONTINUED | OUTPATIENT
Start: 2020-03-23 | End: 2020-03-23 | Stop reason: HOSPADM

## 2020-03-23 RX ORDER — ARFORMOTEROL TARTRATE 15 UG/2ML
15 SOLUTION RESPIRATORY (INHALATION) 2 TIMES DAILY
Status: DISCONTINUED | OUTPATIENT
Start: 2020-03-23 | End: 2020-03-23

## 2020-03-23 RX ORDER — PREDNISONE 20 MG/1
40 TABLET ORAL
Status: DISCONTINUED | OUTPATIENT
Start: 2020-03-23 | End: 2020-03-23 | Stop reason: HOSPADM

## 2020-03-23 RX ORDER — HYDRALAZINE HYDROCHLORIDE 20 MG/ML
10 INJECTION INTRAMUSCULAR; INTRAVENOUS EVERY 6 HOURS
Status: DISCONTINUED | OUTPATIENT
Start: 2020-03-23 | End: 2020-03-23 | Stop reason: HOSPADM

## 2020-03-23 RX ORDER — AMLODIPINE BESYLATE 10 MG/1
10 TABLET ORAL DAILY
Status: DISCONTINUED | OUTPATIENT
Start: 2020-03-23 | End: 2020-03-23 | Stop reason: HOSPADM

## 2020-03-23 RX ORDER — ONDANSETRON 2 MG/ML
4 INJECTION INTRAMUSCULAR; INTRAVENOUS
Status: DISCONTINUED | OUTPATIENT
Start: 2020-03-23 | End: 2020-03-26 | Stop reason: HOSPADM

## 2020-03-23 RX ORDER — ONDANSETRON 4 MG/1
4 TABLET, ORALLY DISINTEGRATING ORAL
Status: DISCONTINUED | OUTPATIENT
Start: 2020-03-23 | End: 2020-03-23

## 2020-03-23 RX ORDER — ATORVASTATIN CALCIUM 20 MG/1
20 TABLET, FILM COATED ORAL
Status: DISCONTINUED | OUTPATIENT
Start: 2020-03-23 | End: 2020-03-23 | Stop reason: HOSPADM

## 2020-03-23 RX ORDER — ALBUTEROL SULFATE 1.25 MG/3ML
1.25 SOLUTION RESPIRATORY (INHALATION)
Status: DISCONTINUED | OUTPATIENT
Start: 2020-03-23 | End: 2020-03-23

## 2020-03-23 RX ORDER — IPRATROPIUM BROMIDE AND ALBUTEROL SULFATE 2.5; .5 MG/3ML; MG/3ML
3 SOLUTION RESPIRATORY (INHALATION)
Status: DISPENSED | OUTPATIENT
Start: 2020-03-23 | End: 2020-03-23

## 2020-03-23 RX ORDER — IPRATROPIUM BROMIDE AND ALBUTEROL SULFATE 2.5; .5 MG/3ML; MG/3ML
3 SOLUTION RESPIRATORY (INHALATION)
Status: DISCONTINUED | OUTPATIENT
Start: 2020-03-23 | End: 2020-03-25

## 2020-03-23 RX ORDER — POLYETHYLENE GLYCOL 3350 17 G/17G
17 POWDER, FOR SOLUTION ORAL DAILY PRN
Status: DISCONTINUED | OUTPATIENT
Start: 2020-03-23 | End: 2020-03-23 | Stop reason: HOSPADM

## 2020-03-23 RX ORDER — METOPROLOL TARTRATE 25 MG/1
12.5 TABLET, FILM COATED ORAL EVERY 12 HOURS
Status: DISCONTINUED | OUTPATIENT
Start: 2020-03-23 | End: 2020-03-23 | Stop reason: HOSPADM

## 2020-03-23 RX ORDER — NITROGLYCERIN 40 MG/100ML
0-20 INJECTION INTRAVENOUS
Status: DISCONTINUED | OUTPATIENT
Start: 2020-03-23 | End: 2020-03-23

## 2020-03-23 RX ADMIN — KETOROLAC TROMETHAMINE 15 MG: 15 INJECTION, SOLUTION INTRAMUSCULAR; INTRAVENOUS at 18:15

## 2020-03-23 RX ADMIN — IPRATROPIUM BROMIDE AND ALBUTEROL SULFATE 3 ML: .5; 3 SOLUTION RESPIRATORY (INHALATION) at 00:50

## 2020-03-23 RX ADMIN — MORPHINE SULFATE 1 MG: 2 INJECTION, SOLUTION INTRAMUSCULAR; INTRAVENOUS at 02:32

## 2020-03-23 RX ADMIN — KETOROLAC TROMETHAMINE 15 MG: 15 INJECTION, SOLUTION INTRAMUSCULAR; INTRAVENOUS at 23:50

## 2020-03-23 RX ADMIN — METOPROLOL TARTRATE 2.5 MG: 5 INJECTION INTRAVENOUS at 23:50

## 2020-03-23 RX ADMIN — NITROGLYCERIN 10 MCG/MIN: 40 INJECTION INTRAVENOUS at 01:39

## 2020-03-23 RX ADMIN — IOPAMIDOL 80 ML: 612 INJECTION, SOLUTION INTRAVENOUS at 06:10

## 2020-03-23 RX ADMIN — HEPARIN SODIUM 1000 UNITS/HR: 10000 INJECTION, SOLUTION INTRAVENOUS at 19:40

## 2020-03-23 RX ADMIN — NITROGLYCERIN 15 MCG/MIN: 40 INJECTION INTRAVENOUS at 03:23

## 2020-03-23 RX ADMIN — HEPARIN SODIUM 4000 UNITS: 1000 INJECTION, SOLUTION INTRAVENOUS; SUBCUTANEOUS at 00:00

## 2020-03-23 RX ADMIN — AZITHROMYCIN MONOHYDRATE 500 MG: 500 INJECTION, POWDER, LYOPHILIZED, FOR SOLUTION INTRAVENOUS at 02:36

## 2020-03-23 RX ADMIN — HYDRALAZINE HYDROCHLORIDE 10 MG: 20 INJECTION INTRAMUSCULAR; INTRAVENOUS at 08:59

## 2020-03-23 RX ADMIN — METHADONE HYDROCHLORIDE 60 MG: 10 TABLET ORAL at 04:02

## 2020-03-23 RX ADMIN — ONDANSETRON 4 MG: 2 INJECTION INTRAMUSCULAR; INTRAVENOUS at 05:01

## 2020-03-23 RX ADMIN — ONDANSETRON 4 MG: 2 INJECTION INTRAMUSCULAR; INTRAVENOUS at 18:15

## 2020-03-23 RX ADMIN — FUROSEMIDE 20 MG: 10 INJECTION, SOLUTION INTRAMUSCULAR; INTRAVENOUS at 08:59

## 2020-03-23 RX ADMIN — METHYLPREDNISOLONE SODIUM SUCCINATE 40 MG: 40 INJECTION, POWDER, FOR SOLUTION INTRAMUSCULAR; INTRAVENOUS at 22:05

## 2020-03-23 RX ADMIN — HEPARIN SODIUM 12 UNITS/KG/HR: 10000 INJECTION, SOLUTION INTRAVENOUS at 00:02

## 2020-03-23 RX ADMIN — ONDANSETRON 4 MG: 2 INJECTION INTRAMUSCULAR; INTRAVENOUS at 22:05

## 2020-03-23 NOTE — Clinical Note
Contrast Dose Calculator:   Patient's age: 53.   Patient's sex: Female. Patient weight (kg) = 75.6. Creatinine level (mg/dL) = 1.3. Creatinine clearance (mL/min): 58.   Contrast concentration (mg/mL) = 300. MACD = 290.77 mL. Max Contrast dose per Creatinine Cl calculator = 130.5 mL.

## 2020-03-23 NOTE — PROGRESS NOTES
0800 Pt coughing thick yellow secretions, no complaints of pain at this time. Uncooperative during shift assessment, unable to complete. Patient refusing NG tube. Spoke with MD regarding BP, new medication orders noted. IV lasix and hyralazine given as documented in [facility]. Will continue to monitor. 0900 Cardiology at beside. 0930 Patient yelling, demanding door be left open. Education provided on droplet precautions and necessity to keep door closed. Patient became Louvenia Michael and threatening staff. Security called. Patient demanding food, education provided on NPO status due to bowel obstruction. Patient states they are leaving AMA. 0945 Security at bed side. Patient agreed to wear procedural mask. Still showing aggression toward staff and refusing to sign AMA papers. 1030 Pt in parking lot in hospital wheelchair. 1040 Received call from emergency department, patient needs to be discharged from system in order to readmit in the emergency room.

## 2020-03-23 NOTE — Clinical Note
Right groin and right radial clipped, prepped with ChloraPrep Wet prep solution applied at: 1015. Wet prep solution dried at: 1018. Wet prep elapsed drying time: 3 mins.

## 2020-03-23 NOTE — PROGRESS NOTES
Patient refused to have this nurse as her nurse. Gave report to Vaishali Garcia RN. Pt came down samuels by nurse station yelling and screaming and spitting on floor.

## 2020-03-23 NOTE — CONSULTS
[facility] - Consult Note    Consultation request by Gerald Ambriz MD for advice/opinion related to evaluating ACS (acute coronary syndrome) [facility]) [I24.9]  NSTEMI (non-ST elevated myocardial infarction) [facility]) [I21.4]    Date of  Admission: 3/22/2020 10:17 PM   Primary Care Physician:  Ariella Vasquez NP     Assessment:       -Acute shortness of breath. Unclear etiology at this point. Her lung sounds are more consistent with a pulmonary etiology with diffuse wheezing. However, she may have a component of heart failure with preserved ejection fraction as well. Historically her LV function has been normal.  Echo from 3/2019 with normal EF of >70%. -Elevated troponin. Suspect this may be due to demand ischemia in the setting of hypertensive urgency. Peak troponin at 0.68. EKG changes are concerning for coronary ischemia, however, these changes were present on prior EKGs last year when she is presented with a similar picture, so this may all be due to LVH and repolarization. She underwent a nuclear stress test at that time.  -Transient left bundle branch block. This has been present in the past.  -Acute partial small bowel obstruction- NPO  -HTN out of control and on NTG drip and starting hydralazine.  -Dyslipidemia  -History of prolonged heroin abuse. Patient states she was a daily user up until last year since starting on a methadone program.  -Tobacco use disorder. Ongoing.    -No primary cardiologist     Plan:     -Would continue heparin drip until her echocardiogram is completed. If her echocardiogram is in fact unchanged from her prior studies and no new regional wall motion abnormalities, I would stop the heparin infusion. -IV NTG and IV hydralazine for BP control and will titrate up as needed.  -Echo today to assess LV function, EF  -Will place on low dose Lasix and monitor symptoms and output  -More recommendations pending test results, clinical course  -Will follow.      History of Present Illness: This is a 53 y.o. female admitted for ACS (acute coronary syndrome) ([facility] [I24.9]  NSTEMI (non-ST elevated myocardial infarction) ([facility] [I21.4]. Patient complains of:  Patient presented with increasing shortness of breath over the last month with a history of having chest pain earlier that month. Today she is also complaining of abdominal pain with a lot of nausea without vomiting. No chest pain today. Cardiac risk factors: smoking/ tobacco exposure, sedentary life style, hypertension, Tobacco abuse      Review of Symptoms:  Except as stated above include:  Constitutional:  negative  Respiratory:  negative  Cardiovascular:  negative  Gastrointestinal: negative  Genitourinary:  negative  Musculoskeletal:  Negative  Neurological:  Negative  Dermatological:  Negative  Endocrinological: Negative  Psychological:  Negative    Pertinent items are noted in HPI. Past Medical History:     Past Medical History:   Diagnosis Date    Asthma     CHF (congestive heart failure) (HCC)     Chronic obstructive pulmonary disease (HCC)     Dependence on supplemental oxygen     Endocrine disease     thyroid issues    Gastrointestinal disorder     \"blockage in my stomach\"    Hypercholesterolemia     Hypertension          Social History:     Social History     Socioeconomic History    Marital status: SINGLE     Spouse name: Not on file    Number of children: Not on file    Years of education: Not on file    Highest education level: Not on file   Tobacco Use    Smoking status: Current Every Day Smoker     Packs/day: 0.25    Smokeless tobacco: Former User   Substance and Sexual Activity    Alcohol use: No     Comment: socially    Drug use: Not Currently     Types: Heroin    Sexual activity: Never     Comment: last used 9 years ago        Family History:   History reviewed. No pertinent family history.      Medications:   No Known Allergies     Current Facility-Administered Medications Medication Dose Route Frequency    famotidine (PEPCID) tablet 20 mg  20 mg Oral DAILY    montelukast (SINGULAIR) tablet 10 mg  10 mg Oral QHS    atorvastatin (LIPITOR) tablet 20 mg  20 mg Oral QHS    cloNIDine HCL (CATAPRES) tablet 0.2 mg  0.2 mg Oral BID    escitalopram oxalate (LEXAPRO) tablet 10 mg  10 mg Oral QPM    [Held by provider] metoprolol tartrate (LOPRESSOR) tablet 12.5 mg  12.5 mg Oral Q12H    roflumilast (DALIRESP) tablet 500 mcg  500 mcg Oral DAILY    lisinopriL (PRINIVIL, ZESTRIL) tablet 20 mg  20 mg Oral DAILY    amLODIPine (NORVASC) tablet 10 mg  10 mg Oral DAILY    QUEtiapine (SEROquel) tablet 300 mg  300 mg Oral QHS    albuterol-ipratropium (DUO-NEB) 2.5 MG-0.5 MG/3 ML  3 mL Nebulization Q4H PRN    0.9% sodium chloride infusion  75 mL/hr IntraVENous CONTINUOUS    albuterol-ipratropium (DUO-NEB) 2.5 MG-0.5 MG/3 ML  3 mL Nebulization Q4H RT    aspirin chewable tablet 81 mg  81 mg Oral DAILY    predniSONE (DELTASONE) tablet 40 mg  40 mg Oral DAILY WITH BREAKFAST    azithromycin (ZITHROMAX) 500 mg in  mL  500 mg IntraVENous Q24H    nitroglycerin (TRIDIL) 400 mcg/ml infusion  0-20 mcg/min IntraVENous TITRATE    LORazepam (ATIVAN) injection 1 mg  1 mg IntraVENous ONCE    ondansetron (ZOFRAN ODT) tablet 4 mg  4 mg Oral Q6H PRN    polyethylene glycol (MIRALAX) packet 17 g  17 g Oral DAILY PRN    senna-docusate (PERICOLACE) 8.6-50 mg per tablet 1 Tab  1 Tab Oral DAILY    arformoteroL (BROVANA) neb solution 15 mcg  15 mcg Nebulization BID RT    budesonide (PULMICORT) 500 mcg/2 ml nebulizer suspension  500 mcg Nebulization BID RT    hydrALAZINE (APRESOLINE) 20 mg/mL injection 10 mg  10 mg IntraVENous Q6H    heparin 25,000 units in D5W 250 ml infusion  12-25 Units/kg/hr IntraVENous TITRATE         Physical Exam:     Visit Vitals  BP (!) 176/104   Pulse 90   Temp 97.9 °F (36.6 °C)   Resp 20   Wt 71.2 kg (157 lb)   SpO2 96%   BMI 31.71 kg/m²     BP Readings from Last 3 Encounters:   03/23/20 (!) 176/104   01/17/20 (!) 229/94   12/29/19 128/55     Pulse Readings from Last 3 Encounters:   03/23/20 90   01/17/20 75   12/29/19 65     Wt Readings from Last 3 Encounters:   03/22/20 71.2 kg (157 lb)   01/17/20 74.8 kg (165 lb)   12/20/19 77.1 kg (170 lb)       General:  alert, cooperative, mild distress, appears stated age  Neck:  nontender, no carotid bruit  Lungs:  clear to auscultation bilaterally  Heart:  regular rate and rhythm, S1, S2 normal, no murmur, click, rub or gallop  Abdomen:  mild tenderness in the throughout abdomen. Extremities:  extremities normal, atraumatic, no cyanosis or edema  Skin: Warm and dry.  no hyperpigmentation, vitiligo, or suspicious lesions  Neuro: alert, oriented x3, affect appropriate, no focal neurological deficits, moves all extremities well, no involuntary movements  Psych: non focal     Data Review:     Recent Labs     03/23/20  0541 03/22/20  2240   WBC 6.7 6.9   HGB 13.2 13.0   HCT 40.1 39.2    197     Recent Labs     03/22/20  2240      K 3.2*      CO2 28   *   BUN 16   CREA 1.15   CA 8.9   ALB 3.5   SGOT 16   ALT 20       Results for orders placed or performed during the hospital encounter of 03/22/20   EKG, 12 LEAD, INITIAL   Result Value Ref Range    Ventricular Rate 80 BPM    Atrial Rate 80 BPM    P-R Interval 134 ms    QRS Duration 86 ms    Q-T Interval 516 ms    QTC Calculation (Bezet) 595 ms    Calculated P Axis 68 degrees    Calculated R Axis -56 degrees    Calculated T Axis -136 degrees    Diagnosis       Normal sinus rhythm  Left anterior fascicular block  Anteroseptal infarct , age undetermined  Marked T wave abnormality, consider inferolateral ischemia  Prolonged QT  Abnormal ECG  When compared with ECG of 17-JAN-2020 10:09,  Left bundle branch block is no longer present  Anteroseptal infarct is now present         All Cardiac Markers in the last 24 hours:    Lab Results   Component Value Date/Time    TROIQ 0.42 (H) 03/23/2020 02:10 AM    TROIQ 0.68 (H) 03/22/2020 10:40 PM       Last Lipid:  No results found for: CHOL, CHOLX, CHLST, CHOLV, HDL, HDLP, LDL, LDLC, DLDLP, TGLX, TRIGL, TRIGP, CHHD, CHHDX    Signed By: DARCIE Nelson     March 23, 2020      Elif Torrez MD

## 2020-03-23 NOTE — PROGRESS NOTES
Report given from Cindy Tenorio, [facility]. Assumed care, patient was not willing to get labs and telemetry due to not having pain medications.       Dr. Arlyn Purvis, said will put orders in for pain and nausea and vomiting

## 2020-03-23 NOTE — H&P
GENERAL GENERIC H&P/CONSULT    Subjective:  25-year-old female with past medical history including COPD, chronic respiratory failure on home O2, hypertension, heart failure with preserved ejection fraction. Patient presented to the emergency department with a complaint of progressively worsening shortness of breath over the past week which is not improving despite uptitrating her oxygen. Patient also endorses wheezing and productive cough of whitish sputum. During my evaluation patient seems tachypneic, appears uncomfortable, complaints of pain all over her body including back, legs, abdomen and chest.  Denies fever or chills. Denies recent travel or sick contacts. On ED vitals patient is afebrile, tachypneic, hemodynamically stable. Pertinent blood work findings including potassium of 3.2, proBNP 12,229, troponin 0.68. EKG, new T wave inversions involving inferior lateral leads. Chest x-ray without acute cardiac pulmonary process. ED provider has discussed case with cardiology Parth SPRAGUE with an impression of NSTEMI. Patient is started on heparin drip. Hospitalist service consulted for further evaluation and management. Past Medical History:   Diagnosis Date    Asthma     CHF (congestive heart failure) (HCC)     Chronic obstructive pulmonary disease (HCC)     Dependence on supplemental oxygen     Endocrine disease     thyroid issues    Gastrointestinal disorder     \"blockage in my stomach\"    Hypercholesterolemia     Hypertension       Past Surgical History:   Procedure Laterality Date    HX GI      Exploratory laparotomy with lysis of adhesions and primary repair of incarcerated umbilical hernia      Prior to Admission medications    Medication Sig Start Date End Date Taking? Authorizing Provider   albuterol (PROVENTIL HFA, VENTOLIN HFA, PROAIR HFA) 90 mcg/actuation inhaler Take 2 Puffs by inhalation every four (4) hours as needed for Wheezing or Shortness of Breath.  Indications: asthma attack, chronic obstructive pulmonary disease 1/17/20   Adeel Gauthier DO   albuterol (ACCUNEB) 1.25 mg/3 mL nebu Take 3 mL by inhalation every four (4) hours as needed for Wheezing (wheezing). 12/30/19   Lia Mosquera MD   OXYGEN-AIR DELIVERY SYSTEMS 3 L by IntraNASal route as needed for Other (sob). Provider, Historical   ciprofloxacin HCl (CIPRO) 250 mg tablet Take 2 Tabs by mouth two (2) times a day. Indications: Bacterial Urinary Tract Infection 11/3/19   Post, Elizabet Dixon MD   ibuprofen (MOTRIN) 800 mg tablet Take 1 Tab by mouth three (3) times daily as needed for Pain. 10/17/19   Charissa Toney MD   docusate sodium (COLACE) 100 mg capsule Take 1 Cap by mouth two (2) times a day. 10/17/19   Charissa Toney MD   ondansetron (ZOFRAN ODT) 4 mg disintegrating tablet Take 1 Tab by mouth every eight (8) hours as needed for Nausea. Indications: prevent nausea and vomiting after surgery 10/17/19   Charissa Toney MD   hydroCHLOROthiazide (HYDRODIURIL) 50 mg tablet Take 25 mg by mouth daily. Provider, Historical   QUEtiapine (SEROQUEL) 300 mg tablet Take 300 mg by mouth nightly. Indications: States she lost her daughter    Provider, Historical   fluticasone furoate-vilanterol (BREO ELLIPTA) 200-25 mcg/dose inhaler Take 1 Puff by inhalation daily. Other, MD Shalini   lisinopril (PRINIVIL, ZESTRIL) 20 mg tablet Take 20 mg by mouth daily. Other, MD Shalini   amLODIPine (NORVASC) 10 mg tablet Take 1 Tab by mouth daily. 7/5/19   Salome Joseph MD   arformoterol (BROVANA) 15 mcg/2 mL nebu neb solution 2 mL by Nebulization route two (2) times a day. 3/14/19   Hernesto Fontanez MD   budesonide (PULMICORT) 0.5 mg/2 mL nbsp 2 mL by Nebulization route two (2) times a day. 3/14/19   Hernesto Fontanez MD   tiotropium (SPIRIVA) 18 mcg inhalation capsule Take 1 Cap by inhalation daily. 3/14/19   Hernesto Fontanez MD   atorvastatin (LIPITOR) 20 mg tablet Take 1 Tab by mouth nightly.  3/14/19   Hernesto Fontanez MD   cloNIDine HCl (CATAPRES) 0.2 mg tablet Take 1 Tab by mouth two (2) times a day. 3/14/19   Aniyah Denis MD   escitalopram oxalate (LEXAPRO) 10 mg tablet Take 1 Tab by mouth every evening. 3/14/19   Aniyah Denis MD   metoprolol tartrate (LOPRESSOR) 25 mg tablet Take 0.5 Tabs by mouth every twelve (12) hours. 3/14/19   Aniyah Denis MD   roflumilast (DALIRESP) 500 mcg tab tablet Take 1 Tab by mouth daily. 3/15/19   Aniyah Denis MD   OTHER Incentive spirometry- use as directed 3/14/19   Aniyah Denis MD   naloxone [facility]) 4 mg/actuation nasal spray Use 1 spray intranasally, then discard. Repeat with new spray every 2 min as needed for opioid overdose symptoms, alternating nostrils. 3/14/19   Aniyah Denis MD   budesonide-formoterol (SYMBICORT) 160-4.5 mcg/actuation HFAA Take 2 Puffs by inhalation two (2) times a day. 8/23/18   Shazia Chao R, DO   famotidine (PEPCID) 20 mg tablet Take 1 Tab by mouth daily. 3/9/17   Hans Reed MD   montelukast (SINGULAIR) 10 mg tablet Take 1 Tab by mouth nightly. 3/9/17   Hans Reed MD     No Known Allergies   Social History     Tobacco Use    Smoking status: Current Every Day Smoker     Packs/day: 0.25    Smokeless tobacco: Former User   Substance Use Topics    Alcohol use: No     Comment: socially      History reviewed. No pertinent family history. Review of Systems   Constitutional: Negative for fever. HENT: Negative. Eyes: Negative for pain and visual disturbance. Respiratory:        As stated in the HPI   Cardiovascular:        As stated in the HPI   Gastrointestinal:        As stated in the HPI   Endocrine: Negative. Genitourinary: Negative. Musculoskeletal: Negative. Skin: Negative. Neurological: Negative. Objective:    No intake/output data recorded. No intake/output data recorded.   Patient Vitals for the past 8 hrs:   BP Temp Pulse Resp SpO2 Weight   03/23/20 0000 (!) 134/98        03/22/20 2325      71.2 kg (157 lb) 03/22/20 2230 127/63 97.9 °F (36.6 °C) 87 30 97 %      Physical Exam  Constitutional:       Comments: Tachypneic, appears uncomfortable. HENT:      Head: Normocephalic and atraumatic. Mouth/Throat:      Mouth: Mucous membranes are moist.      Pharynx: Oropharynx is clear. Eyes:      Conjunctiva/sclera: Conjunctivae normal.      Pupils: Pupils are equal, round, and reactive to light. Neck:      Musculoskeletal: Normal range of motion and neck supple. Cardiovascular:      Rate and Rhythm: Normal rate. Pulses: Normal pulses. Heart sounds: Normal heart sounds. No murmur. No friction rub. No gallop. Pulmonary:      Breath sounds: Wheezing present. Comments: Tachypneic, bilateral wheezing  Abdominal:      General: Bowel sounds are normal.      Palpations: Abdomen is soft. Musculoskeletal: Normal range of motion. Skin:     General: Skin is warm and dry. Neurological:      General: No focal deficit present. Mental Status: She is alert and oriented to person, place, and time. Mental status is at baseline. Cranial Nerves: No cranial nerve deficit. Motor: No weakness.           Labs:    Recent Results (from the past 24 hour(s))   EKG, 12 LEAD, INITIAL    Collection Time: 03/22/20 10:39 PM   Result Value Ref Range    Ventricular Rate 80 BPM    Atrial Rate 80 BPM    P-R Interval 134 ms    QRS Duration 86 ms    Q-T Interval 516 ms    QTC Calculation (Bezet) 595 ms    Calculated P Axis 68 degrees    Calculated R Axis -56 degrees    Calculated T Axis -136 degrees    Diagnosis       Normal sinus rhythm  Left anterior fascicular block  Anteroseptal infarct , age undetermined  Marked T wave abnormality, consider inferolateral ischemia  Prolonged QT  Abnormal ECG  When compared with ECG of 17-JAN-2020 10:09,  Left bundle branch block is no longer present  Anteroseptal infarct is now present     METABOLIC PANEL, COMPREHENSIVE    Collection Time: 03/22/20 10:40 PM   Result Value Ref Range    Sodium 139 136 - 145 mmol/L    Potassium 3.2 (L) 3.5 - 5.5 mmol/L    Chloride 106 100 - 111 mmol/L    CO2 28 21 - 32 mmol/L    Anion gap 5 3.0 - 18 mmol/L    Glucose 116 (H) 74 - 99 mg/dL    BUN 16 7.0 - 18 MG/DL    Creatinine 1.15 0.6 - 1.3 MG/DL    BUN/Creatinine ratio 14 12 - 20      GFR est AA 59 (L) >60 ml/min/1.73m2    GFR est non-AA 49 (L) >60 ml/min/1.73m2    Calcium 8.9 8.5 - 10.1 MG/DL    Bilirubin, total 0.8 0.2 - 1.0 MG/DL    ALT (SGPT) 20 13 - 56 U/L    AST (SGOT) 16 10 - 38 U/L    Alk. phosphatase 97 45 - 117 U/L    Protein, total 7.3 6.4 - 8.2 g/dL    Albumin 3.5 3.4 - 5.0 g/dL    Globulin 3.8 2.0 - 4.0 g/dL    A-G Ratio 0.9 0.8 - 1.7     CBC WITH AUTOMATED DIFF    Collection Time: 03/22/20 10:40 PM   Result Value Ref Range    WBC 6.9 4.6 - 13.2 K/uL    RBC 4.67 4.20 - 5.30 M/uL    HGB 13.0 12.0 - 16.0 g/dL    HCT 39.2 35.0 - 45.0 %    MCV 83.9 74.0 - 97.0 FL    MCH 27.8 24.0 - 34.0 PG    MCHC 33.2 31.0 - 37.0 g/dL    RDW 13.2 11.6 - 14.5 %    PLATELET 222 461 - 927 K/uL    MPV 10.3 9.2 - 11.8 FL    NEUTROPHILS 70 40 - 73 %    LYMPHOCYTES 22 21 - 52 %    MONOCYTES 6 3 - 10 %    EOSINOPHILS 2 0 - 5 %    BASOPHILS 0 0 - 2 %    ABS. NEUTROPHILS 4.8 1.8 - 8.0 K/UL    ABS. LYMPHOCYTES 1.5 0.9 - 3.6 K/UL    ABS. MONOCYTES 0.4 0.05 - 1.2 K/UL    ABS. EOSINOPHILS 0.1 0.0 - 0.4 K/UL    ABS.  BASOPHILS 0.0 0.0 - 0.1 K/UL    DF AUTOMATED     TROPONIN I    Collection Time: 03/22/20 10:40 PM   Result Value Ref Range    Troponin-I, QT 0.68 (H) 0.0 - 0.045 NG/ML   NT-PRO BNP    Collection Time: 03/22/20 10:40 PM   Result Value Ref Range    NT pro-BNP 12,229 (H) 0 - 900 PG/ML   PTT    Collection Time: 03/22/20 10:40 PM   Result Value Ref Range    aPTT 30.4 23.0 - 36.4 SEC       ECG: T wave inversion on inferolateral leads  Chest X-Ray: No acute cardiopulmonary process    Assessment:  Active Problems:    ACS (acute coronary syndrome) ([facility] (3/22/2020)      NSTEMI (non-ST elevated myocardial infarction) ([facility] (3/22/2020)    NSTEMI  -Shortness of breath, elevated troponin, EKG changes  -Heparin drip, aspirin, beta-blocker initiated  -N.p.o.  -Cardiology consult.   Cardiology PA was made aware by ED provider    Acute exacerbation of COPD  -Duo nebs, prednisone, azithromycin Pulmicort  -Continue supportive oxygen    Chronic respiratory failure on home O2  -PT ordered  -Continue supplemental O2, titrate to maintain SPO2>88%    Heart failure with preserved ejection fraction  -No evidence for acute decompensation.    -Most recent EF of 20%, echo in March 2019    DVT prophylaxis: Therapeutically anticoagulated    Full code          Signed:  Micheal Wheat MD 3/23/2020

## 2020-03-23 NOTE — PROGRESS NOTES
Hospitalist follow-up note  Patient started vomiting when she arrived to the medical floor. Complaining of persistent abdominal pain. Noted previous history of SBO.   CT of the abdomen ordered which reveals partial small bowel obstruction transition point in the region of bowel anastomosis.  -N.p.o.  -NG tube with intermittent suctioning  -Lactic acid   -Consider general surgery consult

## 2020-03-23 NOTE — PROGRESS NOTES
responded to a Code Stemi for  Bert Maxwell, who is a 53 y.o.,female,     The  provided the following Interventions:  Provided crisis spiritual care and support. Offered prayers on behalf for the patient. Chart reviewed. The following outcomes were achieved:      Assessment:  There are no further spiritual or Hinduism issues which require intervention at this time. Plan:  Chaplains will continue to follow and will provide spiritual care as needed.  recommends bedside caregivers page  on duty if patient or family shows signs of acute spiritual or emotional distress.        Matilda Gaitan, [address] [address]  [phone]

## 2020-03-23 NOTE — DISCHARGE SUMMARY
AMA NOTE:   Patient has decided to leave the hospital against medical advice. The patient is competent and understands the risks of leaving, including permanent disability and/or death. He/she has had an opportunity to ask question about their condition. The patient has been informed that he/she may return for care at any time. Patient was admitted for SBO hypertensive emergency and shortness of breath. She was made NPO for the SBO. She was started on a nitro drip for hypertension. Cardiology had evaluated due to elevated troponins and BNP with concern for volume overload. Respiratory work-up in progress with sputum cultures,RVP,procalcitonin. Upon admission to her room, patient was combative with nursing staff,refusing vitals ,and refusing to keep the door closed in her room for droplet precautions. Patient became irate and made the decision to leave [place]. I discussed with the patient reasons to remain in the hospital, reasons for NPO status, reasons for contact/droplet precautions and ongoing treatment. Patient refused to sign AMA documents. Security was notified and escorted the patient out of the building.      Addendum:   For purposes of documentation: patient was evaluated for NSTEMI

## 2020-03-23 NOTE — PROGRESS NOTES
Received pt from ED. Coughing a lot. Threw up a lot of mucous. Yelling and demanding pain medicine. Page out to Dr. Brook Valdes. Dr. Brook Valdes returned call and states she will come see patient.

## 2020-03-23 NOTE — ED NOTES
TRANSFER - OUT REPORT:    Verbal report given to Joyce RN(name) on Coco Soto  being transferred to CVTSD(unit) for routine progression of care       Report consisted of patients Situation, Background, Assessment and   Recommendations(SBAR). Information from the following report(s) SBAR, ED Summary, [facility] and Recent Results was reviewed with the receiving nurse. Lines:   Peripheral IV 03/22/20 Left;Posterior Hand (Active)       Peripheral IV 03/22/20 Anterior;Distal;Left Forearm (Active)        Opportunity for questions and clarification was provided.       Patient transported with:   Registered Nurse   Monitor  O2 2L NC          .

## 2020-03-23 NOTE — ED TRIAGE NOTES
Patient arrived via medics from home with shortness of breath x1 weekt hat got worse today with no relief from treatment at home. \"I have COPD and heart failure. \" per patient.

## 2020-03-23 NOTE — PROGRESS NOTES
Follow-up note  Patient reported she is on 80 mg of methadone. Constellation of symptoms concerning for withdrawal.  A dose of methadone ordered. Needs to contact methadone clinic during the day. Elevated troponin, dynamic EKG changes are also concerning for drug use/abuse. Urine drug screen. IV Ativan ordered.

## 2020-03-23 NOTE — PROGRESS NOTES
Hospitalist follow-up note  Noted ST elevation on telemetry monitor and ordered stat repeat EKG. There is ST elevation on leads V3 to V5. ED provider has talked to the interventional cardiologist who just scrubbed in on another code STEMI. Patient remains on heparin drip, she has received aspirin and beta-blockers ordered. Further intervention per interventional cardiology recommendations.

## 2020-03-23 NOTE — ED PROVIDER NOTES
Date: 3/23/2020  Patient Name: Jessica Georges    History of Presenting Illness     Chief Complaint   Patient presents with    Shortness of Breath       History Provided By: Patient      HPI/Chief Complaint: (Context):who presents with chief complaint shortness of breath vomiting chest pain tightness abdominal pain generalized weakness fatigue orthopnea. Patient states she was in the emergency department yesterday and was admitted for heart attack bowel obstruction  I reviewed patient's information with her nothing is new today although she was upset this morning and she left the floor after having an argument with the nurse and then will could not walk and came back down to the emergency department without ever leaving the premises. Patient denies any focal deficits denies any cough congestion denies any other exposures she was admitted recently    --  I reviewed patient's triage note  Patient is ALY level arrival to  Patient's chief complaint is shortness of breath  Patient's pulse is 111, blood pressure 158/105 pulse ox is 99% on room air  Patient presents to the emergency department with triage note stating patient was admitted short of breath patient left AMA this morning. Patient was admitted for NSTEMI patient was placed on droplet precaution, cough and shortness of breath  Allergies none none  Home medication include albuterol Norvasc Lipitor Catapres Colace Lexapro lisinopril Singulair Zofran Seroquel Spiriva  Medical history includes COPD hypertension CHF  Social history is current smoker no alcohol no drugs  Patient's chart review shows that patient was admitted by Dr. Eugenie Shea on  Patient has a AMA note from this morning  Patient with elevated troponin, shortness of breath, left bundle branch block, partial bowel obstruction, I reviewed this patient's labs.   Patient's CBC this morning no significant abnormality, CT abdomen pelvis that was ordered at 6 AM showed mildly dilated small bowel loops with transition point in the region of bowel anastomosis right of midline consistent with small bowel obstruction likely not a high-grade obstruction patient's chemistry from last night without significant abnormality although troponin was 0.68 with a BNP of 12,229          PCP: Shasha Haider NP    Current Facility-Administered Medications   Medication Dose Route Frequency Provider Last Rate Last Dose    furosemide (LASIX) injection 40 mg  40 mg IntraVENous NOW Lisandro Menchaca MD        nitroglycerin (NITROBID) 2 % ointment 1 Inch  1 Inch Topical ONCE Lisandro Menchaca MD         Current Outpatient Medications   Medication Sig Dispense Refill    albuterol (PROVENTIL HFA, VENTOLIN HFA, PROAIR HFA) 90 mcg/actuation inhaler Take 2 Puffs by inhalation every four (4) hours as needed for Wheezing or Shortness of Breath. Indications: asthma attack, chronic obstructive pulmonary disease 1 Inhaler 0    albuterol (ACCUNEB) 1.25 mg/3 mL nebu Take 3 mL by inhalation every four (4) hours as needed for Wheezing (wheezing). 25 Each 0    OXYGEN-AIR DELIVERY SYSTEMS 3 L by IntraNASal route as needed for Other (sob).  ibuprofen (MOTRIN) 800 mg tablet Take 1 Tab by mouth three (3) times daily as needed for Pain. 40 Tab 0    docusate sodium (COLACE) 100 mg capsule Take 1 Cap by mouth two (2) times a day. 120 Cap 0    ondansetron (ZOFRAN ODT) 4 mg disintegrating tablet Take 1 Tab by mouth every eight (8) hours as needed for Nausea. Indications: prevent nausea and vomiting after surgery 20 Tab 0    hydroCHLOROthiazide (HYDRODIURIL) 50 mg tablet Take 25 mg by mouth daily.  QUEtiapine (SEROQUEL) 300 mg tablet Take 300 mg by mouth nightly. Indications: States she lost her daughter      fluticasone furoate-vilanterol (BREO ELLIPTA) 200-25 mcg/dose inhaler Take 1 Puff by inhalation daily.  lisinopril (PRINIVIL, ZESTRIL) 20 mg tablet Take 20 mg by mouth daily.       amLODIPine (NORVASC) 10 mg tablet Take 1 Tab by mouth daily. 30 Tab 0    arformoterol (BROVANA) 15 mcg/2 mL nebu neb solution 2 mL by Nebulization route two (2) times a day. 60 Vial 0    budesonide (PULMICORT) 0.5 mg/2 mL nbsp 2 mL by Nebulization route two (2) times a day. 60 Each 0    tiotropium (SPIRIVA) 18 mcg inhalation capsule Take 1 Cap by inhalation daily. 30 Cap 0    atorvastatin (LIPITOR) 20 mg tablet Take 1 Tab by mouth nightly. 30 Tab 0    cloNIDine HCl (CATAPRES) 0.2 mg tablet Take 1 Tab by mouth two (2) times a day. 60 Tab 0    escitalopram oxalate (LEXAPRO) 10 mg tablet Take 1 Tab by mouth every evening. 30 Tab 0    metoprolol tartrate (LOPRESSOR) 25 mg tablet Take 0.5 Tabs by mouth every twelve (12) hours. 30 Tab 0    roflumilast (DALIRESP) 500 mcg tab tablet Take 1 Tab by mouth daily. 30 Tab 0    OTHER Incentive spirometry- use as directed 1 Each 0    naloxone (NARCAN) 4 mg/actuation nasal spray Use 1 spray intranasally, then discard. Repeat with new spray every 2 min as needed for opioid overdose symptoms, alternating nostrils. 1 Each 0    budesonide-formoterol (SYMBICORT) 160-4.5 mcg/actuation HFAA Take 2 Puffs by inhalation two (2) times a day. 1 Inhaler 1    famotidine (PEPCID) 20 mg tablet Take 1 Tab by mouth daily. 30 Tab 1    montelukast (SINGULAIR) 10 mg tablet Take 1 Tab by mouth nightly. 30 Tab 1       Past History     Past Medical History:  Past Medical History:   Diagnosis Date    Asthma     CHF (congestive heart failure) (HCC)     Chronic obstructive pulmonary disease (HCC)     Dependence on supplemental oxygen     Endocrine disease     thyroid issues    Gastrointestinal disorder     \"blockage in my stomach\"    Hypercholesterolemia     Hypertension        Past Surgical History:  Past Surgical History:   Procedure Laterality Date    HX GI      Exploratory laparotomy with lysis of adhesions and primary repair of incarcerated umbilical hernia       Family History:  History reviewed. No pertinent family history.     Social History:  Social History     Tobacco Use    Smoking status: Current Every Day Smoker     Packs/day: 0.25    Smokeless tobacco: Former User   Substance Use Topics    Alcohol use: No     Comment: socially    Drug use: Not Currently     Types: Heroin       Allergies:  No Known Allergies      Review of Systems   Review of Systems   Constitutional: Negative for activity change, fatigue and fever. HENT: Negative for congestion and rhinorrhea. Eyes: Negative for visual disturbance. Respiratory: Positive for chest tightness and shortness of breath. Cardiovascular: Negative for chest pain and palpitations. Gastrointestinal: Positive for abdominal pain. Negative for diarrhea, nausea and vomiting. Genitourinary: Negative for dysuria and hematuria. Musculoskeletal: Negative for back pain. Skin: Negative for rash. Neurological: Negative for dizziness, weakness and light-headedness. Psychiatric/Behavioral: Negative for agitation. All other systems reviewed and are negative. Physical Exam     Physical Exam  Vitals signs and nursing note reviewed. Constitutional:       General: She is not in acute distress. Appearance: She is well-developed. HENT:      Head: Normocephalic and atraumatic. Right Ear: External ear normal.      Left Ear: External ear normal.      Nose: Nose normal.   Eyes:      General: No scleral icterus. Conjunctiva/sclera: Conjunctivae normal.      Pupils: Pupils are equal, round, and reactive to light. Neck:      Musculoskeletal: Normal range of motion and neck supple. Thyroid: No thyromegaly. Vascular: No JVD. Trachea: No tracheal deviation. Cardiovascular:      Rate and Rhythm: Normal rate and regular rhythm. Heart sounds: No murmur. No friction rub. Pulmonary:      Effort: Pulmonary effort is normal.      Breath sounds: No stridor. Wheezing present. Chest:      Chest wall: No tenderness.    Abdominal:      General: Bowel sounds are normal. There is no distension. Palpations: Abdomen is soft. Tenderness: There is no abdominal tenderness. There is no guarding or rebound. Musculoskeletal: Normal range of motion. General: No tenderness. Lymphadenopathy:      Cervical: No cervical adenopathy. Skin:     General: Skin is warm and dry. Capillary Refill: Capillary refill takes less than 2 seconds. Neurological:      General: No focal deficit present. Mental Status: She is alert. Cranial Nerves: No cranial nerve deficit. Coordination: Coordination normal.   Psychiatric:         Mood and Affect: Mood normal.         Behavior: Behavior normal.         Thought Content: Thought content normal.         Judgment: Judgment normal.         Medical Decision Making   I am the first provider for this patient. I reviewed the vital signs, available nursing notes, past medical history, past surgical history, family history and social history. Provider Notes (Medical Decision Making): Chief complaint of shortness of breath exertional dyspnea orthopnea chest tightness abdominal pain recent diagnosis STEMI bowel obstruction CHF  Recent discharge from the hospital  Discussed this information with the hospital restart treatment plan I will not repeat all the labs as patient was just with labs this morning as well. I will start albuterol as well as patient is wheezing in the emergency department  Patient can go to telemetry floor as she is completely stable in the emergency department talking in full sentences in no distress in the emergency department. Patient does need to finish her evaluation for the bowel obstruction heart failure and an STEMI. I discussed this information with Dr. Natalya Mays and she agrees with the disposition and plan and will see the patient. Vital Signs-Reviewed the patient's vital signs.     Pulse Oximetry Analysis -pulse ox of 99% on room air, normal  Cardiac Monitor:  Rate/Rhythm: 111, sinus tach,    EKG:  Interpreted by the EP. EKG done at 1137  90 heart rate, leftward axis, QTC is widened to 496 ms  Patient with sinus rhythm although left bundle branch block can be appreciated this is change compared to prior EKG. This is the same lab bundle branch block that was present in this morning presentation EKG as well. Patient 2D echo done on last year March 2019 shows EF of 70% left ventricular hypertrophy hypertrophy, moderate concentric hypertrophy  Patient stress test from 3/13/2019- myocardial perfusion imaging low risk stress test no ST segment elevation appreciated  ---    Vitals:    03/23/20 1050   BP: (!) 158/105   Pulse: (!) 111   Resp: 24   Temp: 97.9 °F (36.6 °C)   SpO2: 99%   Weight: 71.2 kg (157 lb)   Height: 4' 11\" (1.499 m)     Records Reviewed: Nursing Notes    ED Course:   11:56 AM  Patient remained stable in the emergency room and to the bed for this patient. Patient will get symptomatic relief in the emergency department further work-up as per the admitting physician.         Diagnostic Study Results     Orders Placed This Encounter    XR CHEST SNGL V     Standing Status:   Standing     Number of Occurrences:   1     Order Specific Question:   Transport     Answer:   No Transport [3]     Order Specific Question:   Reason for Exam     Answer:   SOB    CBC WITH AUTOMATED DIFF     Standing Status:   Standing     Number of Occurrences:   1    METABOLIC PANEL, BASIC     Standing Status:   Standing     Number of Occurrences:   1    CARDIAC PANEL,(CK, CKMB & TROPONIN)     Standing Status:   Standing     Number of Occurrences:   1    Pro BNP     Standing Status:   Standing     Number of Occurrences:   1    PTT     Standing Status:   Standing     Number of Occurrences:   1    LIPASE     Standing Status:   Standing     Number of Occurrences:   1    DIET NPO     Standing Status:   Standing     Number of Occurrences:   1    Cardiac Monitor     Standing Status:   Standing Number of Occurrences:   1     Order Specific Question:   Type: Answer:   Bedside     Order Specific Question:   Off unit: Answer:   w/ Tele only    EKG, 12 LEAD, INITIAL     Standing Status:   Standing     Number of Occurrences:   1     Order Specific Question:   Reason for Exam:     Answer:   SOB    furosemide (LASIX) injection 40 mg    nitroglycerin (NITROBID) 2 % ointment 1 Inch    INITIAL PHYSICIAN ORDER: INPATIENT Medical; 3. Patient receiving treatment that can only be provided in an inpatient setting (further clarification in H&P documentation)     Standing Status:   Standing     Number of Occurrences:   1     Order Specific Question:   Status: Answer:   INPATIENT [101]     Order Specific Question:   Type of Bed     Answer:   Medical [8]     Order Specific Question:   Inpatient Hospitalization Certified Necessary for the Following Reasons     Answer:   3.  Patient receiving treatment that can only be provided in an inpatient setting (further clarification in H&P documentation)     Order Specific Question:   Admitting Diagnosis     Answer:   CHF (congestive heart failure) [facility]) [[id number]]     Order Specific Question:   Admitting Diagnosis     Answer:   NSTEMI (non-ST elevated myocardial infarction) [facility]) [[id number]]     Order Specific Question:   Admitting Diagnosis     Answer:   SBO (small bowel obstruction) [facility]) [[id number]]     Order Specific Question:   Admitting Physician     Answer:   Mick Whitney [de-identified]     Order Specific Question:   Attending Physician     Answer:   Mick Whitney [de-identified]     Order Specific Question:   Estimated Length of Stay     Answer:   2 Midnights     Order Specific Question:   Discharge Plan:     Answer:   Home with Office Follow-up       Labs -     Recent Results (from the past 12 hour(s))   EKG, 12 LEAD, SUBSEQUENT    Collection Time: 03/23/20  1:05 AM   Result Value Ref Range    Ventricular Rate 83 BPM    Atrial Rate 83 BPM    P-R Interval 160 ms    QRS Duration 128 ms    Q-T Interval 396 ms    QTC Calculation (Bezet) 465 ms    Calculated P Axis 67 degrees    Calculated R Axis -51 degrees    Calculated T Axis 172 degrees    Diagnosis       Normal sinus rhythm  Possible Left atrial enlargement  Left axis deviation  Nonspecific intraventricular block  Anterolateral infarct (cited on or before 22-MAR-2020)  Abnormal ECG  When compared with ECG of 22-MAR-2020 22:39,  QRS duration has increased  Serial changes of evolving Anterior infarct present  Serial changes of evolving Anterolateral infarct present  Confirmed by Alessia Russell [id number]) on 3/23/2020 10:37:40 AM     TROPONIN I    Collection Time: 03/23/20  2:10 AM   Result Value Ref Range    Troponin-I, QT 0.42 (H) 0.0 - 0.045 NG/ML   POC G3    Collection Time: 03/23/20  2:30 AM   Result Value Ref Range    Device: ROOM AIR      pH (POC) 7.372 7.35 - 7.45      pCO2 (POC) 46.1 (H) 35.0 - 45.0 MMHG    pO2 (POC) 62 (L) 80 - 100 MMHG    HCO3 (POC) 26.7 (H) 22 - 26 MMOL/L    sO2 (POC) 91 (L) 92 - 97 %    Base excess (POC) 1 mmol/L    Allens test (POC) YES      Site LEFT RADIAL      Specimen type (POC) ARTERIAL      Performed by Misty Pascual    INFLUENZA A & B AG (RAPID TEST)    Collection Time: 03/23/20  2:50 AM   Result Value Ref Range    Influenza A Antigen NEGATIVE  NEG      Influenza B Antigen NEGATIVE  NEG     PTT    Collection Time: 03/23/20  5:41 AM   Result Value Ref Range    aPTT 90.6 (H) 23.0 - 36.4 SEC   CBC WITH AUTOMATED DIFF    Collection Time: 03/23/20  5:41 AM   Result Value Ref Range    WBC 6.7 4.6 - 13.2 K/uL    RBC 4.76 4.20 - 5.30 M/uL    HGB 13.2 12.0 - 16.0 g/dL    HCT 40.1 35.0 - 45.0 %    MCV 84.2 74.0 - 97.0 FL    MCH 27.7 24.0 - 34.0 PG    MCHC 32.9 31.0 - 37.0 g/dL    RDW 13.8 11.6 - 14.5 %    PLATELET 360 892 - 802 K/uL    MPV 12.7 (H) 9.2 - 11.8 FL    NEUTROPHILS 88 (H) 40 - 73 %    LYMPHOCYTES 11 (L) 21 - 52 %    MONOCYTES 1 (L) 3 - 10 %    EOSINOPHILS 0 0 - 5 %    BASOPHILS 0 0 - 2 %    ABS. NEUTROPHILS 5.9 1.8 - 8.0 K/UL    ABS. LYMPHOCYTES 0.8 (L) 0.9 - 3.6 K/UL    ABS. MONOCYTES 0.0 (L) 0.05 - 1.2 K/UL    ABS. EOSINOPHILS 0.0 0.0 - 0.4 K/UL    ABS. BASOPHILS 0.0 0.0 - 0.1 K/UL    DF AUTOMATED     LACTIC ACID    Collection Time: 03/23/20  5:41 AM   Result Value Ref Range    Lactic acid 0.8 0.4 - 2.0 MMOL/L   GLUCOSE, POC    Collection Time: 03/23/20  7:43 AM   Result Value Ref Range    Glucose (POC) 201 (H) 70 - 110 mg/dL   CBC WITH AUTOMATED DIFF    Collection Time: 03/23/20 11:22 AM   Result Value Ref Range    WBC 8.7 4.6 - 13.2 K/uL    RBC 5.25 4.20 - 5.30 M/uL    HGB 14.6 12.0 - 16.0 g/dL    HCT 43.4 35.0 - 45.0 %    MCV 82.7 74.0 - 97.0 FL    MCH 27.8 24.0 - 34.0 PG    MCHC 33.6 31.0 - 37.0 g/dL    RDW 13.1 11.6 - 14.5 %    PLATELET 552 609 - 725 K/uL    MPV 10.8 9.2 - 11.8 FL    NEUTROPHILS 81 (H) 40 - 73 %    LYMPHOCYTES 16 (L) 21 - 52 %    MONOCYTES 3 3 - 10 %    EOSINOPHILS 0 0 - 5 %    BASOPHILS 0 0 - 2 %    ABS. NEUTROPHILS 7.1 1.8 - 8.0 K/UL    ABS. LYMPHOCYTES 1.4 0.9 - 3.6 K/UL    ABS. MONOCYTES 0.2 0.05 - 1.2 K/UL    ABS. EOSINOPHILS 0.0 0.0 - 0.4 K/UL    ABS.  BASOPHILS 0.0 0.0 - 0.1 K/UL    DF AUTOMATED     PTT    Collection Time: 03/23/20 11:22 AM   Result Value Ref Range    aPTT 28.0 23.0 - 36.4 SEC   EKG, 12 LEAD, INITIAL    Collection Time: 03/23/20 11:37 AM   Result Value Ref Range    Ventricular Rate 90 BPM    Atrial Rate 90 BPM    P-R Interval 138 ms    QRS Duration 140 ms    Q-T Interval 406 ms    QTC Calculation (Bezet) 496 ms    Calculated P Axis 70 degrees    Calculated R Axis -48 degrees    Calculated T Axis 131 degrees    Diagnosis       Normal sinus rhythm  Left axis deviation  Nonspecific intraventricular block  Possible Anterolateral infarct (cited on or before 22-MAR-2020)  T wave abnormality, consider inferior ischemia  Abnormal ECG  When compared with ECG of 23-MAR-2020 01:05,  No significant change was found  Confirmed by Rui Luther ([id number]) on 3/23/2020 11:46:24 AM         Radiologic Studies -   XR CHEST SNGL V    (Results Pending)     CT Results  (Last 48 hours)               03/23/20 0611  CT ABD PELV W CONT Final result    Impression:  IMPRESSION:       Mildly dilated small bowel predominantly in the left side of abdomen with   transition point in the region of the bowel anastomosis right of midline   consistent with partial small bowel obstruction, likely not a high-grade   obstruction due to only minimal collapse of more downstream bowel and presence   of a significant volume of stool in the colon. Probable hepatic and left renal cysts. See additional details above. Narrative:  EXAMINATION: CT abdomen/pelvis with IV contrast       INDICATION: Abdominal pain       COMPARISON: CT 10/31/2019       TECHNIQUE: CT of the abdomen and pelvis performed following 80 cc IV Isovue-300   with multiplanar reformations. All CT scans at this facility are performed using   dose optimization technique as appropriate to a performed exam, to include   automated exposure control, adjustment of the mA and/or kV according to patient   size (including appropriate matching first site specific examinations), or use   of iterative reconstruction technique. Lower chest: Unremarkable. Hepatobiliary: A few probable hepatic cysts, some too small to characterize. Gallbladder unremarkable. Pancreas: Unremarkable. Spleen: Unremarkable. Adrenals: Unremarkable. Genitourinary: Right kidney unremarkable. Left kidney upper pole cyst otherwise   unremarkable. Bladder unremarkable. Intrauterine device noted. Gastrointestinal: Stomach unremarkable. Multiple mildly dilated loops of small   bowel in the left side of abdomen with transition at the level of a bowel   anastomosis near central abdomen right of midline, but no more than minimal   collapse of the more downstream small bowel, and notable amount of colonic stool   present.  Appendix not clearly delineated but there is no suspicious pericecal   inflammation. Mesentery/vessels/nodes: No free air. Trace free fluid. Major vessels   unremarkable. No adenopathy by size criteria. Miscellaneous: Superficial soft tissues unremarkable. Bones: No acute osseous findings. CXR Results  (Last 48 hours)               03/22/20 2255  XR CHEST PORT Final result    Impression:  IMPRESSION:       Perhaps mild perihilar interstitial infiltrate/edema. Narrative:  EXAMINATION: Chest single view       INDICATION: Shortness of breath       COMPARISON: 1/17/2020       FINDINGS: Single frontal view the chest obtained. Mediastinal silhouette normal   in size. Slightly prominent perihilar interstitium. No confluent consolidation. No evidence of pneumothorax. No acute osseous findings. Discharge     Clinical Impression:   1. Congestive heart failure, unspecified HF chronicity, unspecified heart failure type ([address]    2. NSTEMI (non-ST elevated myocardial infarction) ([address]    3. Small bowel obstruction ([address]    4. SOB (shortness of breath)    5.  Nausea and vomiting, intractability of vomiting not specified, unspecified vomiting type      Disposition:  Home    It should be noted that I will be the provider of record for this patient  Melissa Jennings MD      Follow-up Information    None         Current Discharge Medication List

## 2020-03-23 NOTE — H&P
Hospitalist Admission History and Physical    NAME:  Pio Lebron   :   1965   MRN:   [id number]     PCP:  Gustavo Mishra NP  Date/Time:  3/23/2020 7:52 PM  Subjective:   CHIEF COMPLAINT: sob and pain    HISTORY OF PRESENT ILLNESS:     Ana Walker is a 53 y.o.   female who presents with above-stated complaints. Please note that her history is limited as at the time of my evaluation patient was vomiting and demanding to have pain meds and was overall a poor historian. Patient had left AGAINST MEDICAL ADVICE from previous admission earlier on same date. She was admitted previously for chief complaint of progressively worsening shortness of breath, she had been noted to be tachypneic and uncomfortable and had complained of pain all over her body. She denied fevers or chills and recent travel or sick contacts. In the ED she been found to have elevated proBNP, elevated troponin. She was noted to be tachypneic and chest x-ray was negative for acute cardiopulmonary process. She was admitted for management of what was thought to be non-ST elevation MI, she was started on a heparin drip and was seen by the cardiologist.  Overall the thought was that she likely had diastolic heart failure although she may have had a component of pulmonary contribution to her symptoms as she was noted to be wheezing. She also was noted to have mildly dilated small bowel on CT scanning imaging earlier today consistent with partial small bowel obstruction. However patient left AGAINST MEDICAL ADVICE before got the echo after which cardiology was to decide whether to discontinue heparin drip or not based on findings of new wall motion abnormalities and without getting any type of management for her finding of small bowel obstruction. She had returned shortly thereafter with chief complaint of shortness of breath, vomiting and chest tightness as well as abdominal pain. She has been admitted back for management of same.         Past Medical History:   Diagnosis Date    Asthma     CHF (congestive heart failure) (HCC)     Chronic obstructive pulmonary disease (HCC)     Dependence on supplemental oxygen     Endocrine disease     thyroid issues    Gastrointestinal disorder     \"blockage in my stomach\"    Hypercholesterolemia     Hypertension         Past Surgical History:   Procedure Laterality Date    HX GI      Exploratory laparotomy with lysis of adhesions and primary repair of incarcerated umbilical hernia       Social History     Tobacco Use    Smoking status: Current Every Day Smoker     Packs/day: 0.25    Smokeless tobacco: Former User   Substance Use Topics    Alcohol use: No     Comment: socially        History reviewed. No pertinent family history. No Known Allergies     Prior to Admission Medications   Prescriptions Last Dose Informant Patient Reported? Taking? OTHER   No No   Sig: Incentive spirometry- use as directed   OXYGEN-AIR DELIVERY SYSTEMS   Yes No   Sig: 3 L by IntraNASal route as needed for Other (sob). QUEtiapine (SEROQUEL) 300 mg tablet   Yes No   Sig: Take 300 mg by mouth nightly. Indications: States she lost her daughter   albuterol (ACCUNEB) 1.25 mg/3 mL nebu   No No   Sig: Take 3 mL by inhalation every four (4) hours as needed for Wheezing (wheezing). albuterol (PROVENTIL HFA, VENTOLIN HFA, PROAIR HFA) 90 mcg/actuation inhaler   No No   Sig: Take 2 Puffs by inhalation every four (4) hours as needed for Wheezing or Shortness of Breath. Indications: asthma attack, chronic obstructive pulmonary disease   amLODIPine (NORVASC) 10 mg tablet   No No   Sig: Take 1 Tab by mouth daily. arformoterol (BROVANA) 15 mcg/2 mL nebu neb solution   No No   Si mL by Nebulization route two (2) times a day. atorvastatin (LIPITOR) 20 mg tablet   No No   Sig: Take 1 Tab by mouth nightly. budesonide (PULMICORT) 0.5 mg/2 mL nbsp   No No   Si mL by Nebulization route two (2) times a day.    budesonide-formoterol (SYMBICORT) 160-4.5 mcg/actuation HFAA   No No   Sig: Take 2 Puffs by inhalation two (2) times a day. cloNIDine HCl (CATAPRES) 0.2 mg tablet   No No   Sig: Take 1 Tab by mouth two (2) times a day. docusate sodium (COLACE) 100 mg capsule   No No   Sig: Take 1 Cap by mouth two (2) times a day. escitalopram oxalate (LEXAPRO) 10 mg tablet   No No   Sig: Take 1 Tab by mouth every evening. famotidine (PEPCID) 20 mg tablet   No No   Sig: Take 1 Tab by mouth daily. fluticasone furoate-vilanterol (BREO ELLIPTA) 200-25 mcg/dose inhaler   Yes No   Sig: Take 1 Puff by inhalation daily. hydroCHLOROthiazide (HYDRODIURIL) 50 mg tablet   Yes No   Sig: Take 25 mg by mouth daily. ibuprofen (MOTRIN) 800 mg tablet   No No   Sig: Take 1 Tab by mouth three (3) times daily as needed for Pain. lisinopril (PRINIVIL, ZESTRIL) 20 mg tablet   Yes No   Sig: Take 20 mg by mouth daily. metoprolol tartrate (LOPRESSOR) 25 mg tablet   No No   Sig: Take 0.5 Tabs by mouth every twelve (12) hours. montelukast (SINGULAIR) 10 mg tablet   No No   Sig: Take 1 Tab by mouth nightly. naloxone (NARCAN) 4 mg/actuation nasal spray   No No   Sig: Use 1 spray intranasally, then discard. Repeat with new spray every 2 min as needed for opioid overdose symptoms, alternating nostrils. ondansetron (ZOFRAN ODT) 4 mg disintegrating tablet   No No   Sig: Take 1 Tab by mouth every eight (8) hours as needed for Nausea. Indications: prevent nausea and vomiting after surgery   roflumilast (DALIRESP) 500 mcg tab tablet   No No   Sig: Take 1 Tab by mouth daily. tiotropium (SPIRIVA) 18 mcg inhalation capsule   No No   Sig: Take 1 Cap by inhalation daily.       Facility-Administered Medications: None       REVIEW OF SYSTEMS:     [x] Unable to obtain  ROS due to  []mental status change  []sedated   []intubated   []Total of 12 systems reviewed as follows: Patient tearful and demanding pain meds is not answering questions     constitutional:  negative fever, negative chills, negative weight loss  Eyes:   negative visual changes  ENT:   negative sore throat, tongue or lip swelling  Respiratory:  negative cough, negative dyspnea  Cards:   negative for chest pain, palpitations, lower extremity edema  GI:   negative for nausea, vomiting, diarrhea, and abdominal pain  Genitourinary: negative for frequency, dysuria  Integument:  negative for rash and pruritus  Hematologic:  negative for easy bruising and gum/nose bleeding  Musculoskel: negative for myalgias,  back pain and muscle weakness  Neurological:  negative for headaches, dizziness, vertigo  Behavl/Psych:  negative for feelings of anxiety, depression     Pertinent Positives include :    Objective:   VITALS:    Visit Vitals  /90 (BP 1 Location: Left arm, BP Patient Position: At rest)   Pulse (!) 101   Temp 98.6 °F (37 °C)   Resp 22   Ht 4' 11\" (1.499 m)   Wt 71.2 kg (157 lb)   LMP 2009   SpO2 98%   BMI 31.71 kg/m²     Temp (24hrs), Av.5 °F (36.9 °C), Min:97.9 °F (36.6 °C), Max:99.5 °F (37.5 °C)    PHYSICAL EXAM:   General:    Alert, nobcooperative, in some distress, appears stated age. Head:   Normocephalic, without obvious abnormality, atraumatic. Eyes:   Conjunctivae clear, anicteric sclerae. Pupils are equal  Nose:  Nares normal. No drainage. Neck:  Supple, symmetrical,  no JVD.     Unable to do rest of exam, pt demanding meds      LAB DATA REVIEWED:    No components found for: [place]  Recent Labs     20  1122 20  0541 20  2240     --  139   K 3.8  --  3.2*     --  106   CO2 28  --  28   BUN 16  --  16   CREA 1.15  --  1.15   *  --  116*   CA 9.7  --  8.9   ALB  --   --  3.5   WBC 8.7 6.7 6.9   HGB 14.6 13.2 13.0   HCT 43.4 40.1 39.2    229 197         IMAGING RESULTS:   [x]  I have personally reviewed the actual   [x]CXR  []CT scan    CXR:  No acute findings  CT abd :IMPRESSION:     Mildly dilated small bowel predominantly in the left side of abdomen with  transition point in the region of the bowel anastomosis right of midline  consistent with partial small bowel obstruction, likely not a high-grade  obstruction due to only minimal collapse of more downstream bowel and presence  of a significant volume of stool in the colon.     Probable hepatic and left renal cysts.     See additional details above. Assessment/Plan:      Active Problems:    NSTEMI (non-ST elevated myocardial infarction) ([facility] [address] (3/22/2020)      CHF (congestive heart failure) ([facility] [address] (3/23/2020)      SBO (small bowel obstruction) ([facility] [address] (3/23/2020)     History of COPD, may have acute exacerbation    ___________________________________________________  PLAN:      -NSTEMI--> heparin gtt, cardiac echo, if echo negative for new WMA, d/c heparin gtt  Will start IV bb, rectal asa.   -COPD--> duo nebs scheduled and PRN, IV steroid  -SBO--> NPO, gen surgery consult  -Heroin abuse, using toradol for pain control at this time    Risk of deterioration:  []Low    [x]Moderate  []High              Prophylaxis:  []Lovenox  []Coumadin  [x]Hep SQ  []SCDs  []H2B/PPI    Disposition:  [x]Home w/ Family   []HH PT,OT,RN   []SNF/LTC   []SAH/Rehab    Discussed Code Status:    [x]Full Code      []DNR     ___________________________________________________    Care Plan discussed with:    [x]Patient   []Family    []ED Care Manager  [x]ED Doc   []Specialist :    Total Time Coordinating Admission:      minutes    []Total Critical Care Time:     ___________________________________________________  Admitting Physician: Jorge Luis Magana MD

## 2020-03-23 NOTE — ED PROVIDER NOTES
EMERGENCY DEPARTMENT HISTORY AND PHYSICAL EXAM    Date: 3/22/2020  Patient Name: Baldomero Pulido    History of Presenting Illness     Chief Complaint   Patient presents with    Shortness of Breath         History Provided By: Patient        Additional History (Context): Baldomero Pulido is a 53 y.o. female with hypertension, COPD, asthma and Pain syndrome with polysubstance abuse, DVT prophylaxis, chronic kidney disease, who presents with a complaint of shortness of breath and chest pain x1 week with exacerbation of her asthma despite using her 3 L of home oxygen and multiple nebulizer treatments today. Patient denies fever, however she has baseline body aches. Patient brought in via EMS after police arrived at her door to serve a warrant. Patient denies travel outside of the country. Patient does report influenza vaccine 1 month ago. PCP: Meg Serrano NP    Current Facility-Administered Medications   Medication Dose Route Frequency Provider Last Rate Last Dose    heparin (porcine) 1,000 unit/mL injection 4,000 Units  4,000 Units IntraVENous ONCE Blanca Rush PA        heparin 25,000 units in D5W 250 ml infusion  12-25 Units/kg/hr IntraVENous TITRATE Blanca Rush PA        aspirin tablet 325 mg  325 mg Oral NOW Blnaca Rush PA         Current Outpatient Medications   Medication Sig Dispense Refill    albuterol (PROVENTIL HFA, VENTOLIN HFA, PROAIR HFA) 90 mcg/actuation inhaler Take 2 Puffs by inhalation every four (4) hours as needed for Wheezing or Shortness of Breath. Indications: asthma attack, chronic obstructive pulmonary disease 1 Inhaler 0    albuterol (ACCUNEB) 1.25 mg/3 mL nebu Take 3 mL by inhalation every four (4) hours as needed for Wheezing (wheezing). 25 Each 0    OXYGEN-AIR DELIVERY SYSTEMS 3 L by IntraNASal route as needed for Other (sob).  ciprofloxacin HCl (CIPRO) 250 mg tablet Take 2 Tabs by mouth two (2) times a day.  Indications: Bacterial Urinary Tract Infection 15 Tab 0    ibuprofen (MOTRIN) 800 mg tablet Take 1 Tab by mouth three (3) times daily as needed for Pain. 40 Tab 0    docusate sodium (COLACE) 100 mg capsule Take 1 Cap by mouth two (2) times a day. 120 Cap 0    ondansetron (ZOFRAN ODT) 4 mg disintegrating tablet Take 1 Tab by mouth every eight (8) hours as needed for Nausea. Indications: prevent nausea and vomiting after surgery 20 Tab 0    hydroCHLOROthiazide (HYDRODIURIL) 50 mg tablet Take 25 mg by mouth daily.  QUEtiapine (SEROQUEL) 300 mg tablet Take 300 mg by mouth nightly. Indications: States she lost her daughter      fluticasone furoate-vilanterol (BREO ELLIPTA) 200-25 mcg/dose inhaler Take 1 Puff by inhalation daily.  lisinopril (PRINIVIL, ZESTRIL) 20 mg tablet Take 20 mg by mouth daily.  amLODIPine (NORVASC) 10 mg tablet Take 1 Tab by mouth daily. 30 Tab 0    arformoterol (BROVANA) 15 mcg/2 mL nebu neb solution 2 mL by Nebulization route two (2) times a day. 60 Vial 0    budesonide (PULMICORT) 0.5 mg/2 mL nbsp 2 mL by Nebulization route two (2) times a day. 60 Each 0    tiotropium (SPIRIVA) 18 mcg inhalation capsule Take 1 Cap by inhalation daily. 30 Cap 0    atorvastatin (LIPITOR) 20 mg tablet Take 1 Tab by mouth nightly. 30 Tab 0    cloNIDine HCl (CATAPRES) 0.2 mg tablet Take 1 Tab by mouth two (2) times a day. 60 Tab 0    escitalopram oxalate (LEXAPRO) 10 mg tablet Take 1 Tab by mouth every evening. 30 Tab 0    metoprolol tartrate (LOPRESSOR) 25 mg tablet Take 0.5 Tabs by mouth every twelve (12) hours. 30 Tab 0    roflumilast (DALIRESP) 500 mcg tab tablet Take 1 Tab by mouth daily. 30 Tab 0    OTHER Incentive spirometry- use as directed 1 Each 0    naloxone (NARCAN) 4 mg/actuation nasal spray Use 1 spray intranasally, then discard. Repeat with new spray every 2 min as needed for opioid overdose symptoms, alternating nostrils.  1 Each 0    budesonide-formoterol (SYMBICORT) 160-4.5 mcg/actuation HFAA Take 2 Puffs by inhalation two (2) times a day. 1 Inhaler 1    famotidine (PEPCID) 20 mg tablet Take 1 Tab by mouth daily. 30 Tab 1    montelukast (SINGULAIR) 10 mg tablet Take 1 Tab by mouth nightly. 30 Tab 1       Past History     Past Medical History:  Past Medical History:   Diagnosis Date    Asthma     CHF (congestive heart failure) (HCC)     Chronic obstructive pulmonary disease (HCC)     Dependence on supplemental oxygen     Endocrine disease     thyroid issues    Gastrointestinal disorder     \"blockage in my stomach\"    Hypercholesterolemia     Hypertension        Past Surgical History:  Past Surgical History:   Procedure Laterality Date    HX GI      Exploratory laparotomy with lysis of adhesions and primary repair of incarcerated umbilical hernia       Family History:  History reviewed. No pertinent family history. Social History:  Social History     Tobacco Use    Smoking status: Current Every Day Smoker     Packs/day: 0.25    Smokeless tobacco: Former User   Substance Use Topics    Alcohol use: No     Comment: socially    Drug use: Not Currently     Types: Heroin       Allergies:  No Known Allergies      Review of Systems   Review of Systems  Review of Systems   Constitutional: Positive for fatigue and fever. HENT: Positive for congestion. Respiratory: Positive for cough and shortness of breath. Cardiovascular: Positive for chest pain. Gastrointestinal: Negative for abdominal pain, diarrhea, nausea and vomiting. Genitourinary: Negative for difficulty urinating and dysuria. Musculoskeletal: Patient has multiple MSK complaints which \"require morphine\". Skin: Negative for wound. Neurological: Negative for dizziness and headaches. All other systems reviewed and are negative.        All Other Systems Negative  Physical Exam     Vitals:    03/22/20 2230 03/22/20 2325   BP: 127/63    Pulse: 87    Resp: 30    Temp: 97.9 °F (36.6 °C)    SpO2: 97%    Weight:  71.2 kg (157 lb)     Physical Exam Constitutional: Pt is oriented to person, place, and time. Pt appears well-developed and well-nourished. Patient has exacerbated distress. HENT:   Head: Normocephalic and atraumatic. Mouth/Throat: Oropharynx is clear and moist.   Eyes: Pupils are equal, round, and reactive to light. Neck: Normal range of motion. Neck supple. No tracheal deviation present. No thyromegaly present. Cardiovascular: Normal rate, regular rhythm and normal heart sounds. No murmur heard. Pulmonary/Chest: Effort normal and breath sounds normal. Wheezing is present throughout. Abdominal: Soft. no distension and no mass. There is no tenderness. There is no rebound and no guarding. Musculoskeletal: Normal range of motion. No edema or deformity. Neurological: Pt is alert and oriented to person, place, and time   Skin: Skin is warm and dry.    Psychiatric: Pt has a normal mood and affect;  behavior is normal. Judgment and thought content normal.           Diagnostic Study Results     Labs -     Recent Results (from the past 12 hour(s))   EKG, 12 LEAD, INITIAL    Collection Time: 03/22/20 10:39 PM   Result Value Ref Range    Ventricular Rate 80 BPM    Atrial Rate 80 BPM    P-R Interval 134 ms    QRS Duration 86 ms    Q-T Interval 516 ms    QTC Calculation (Bezet) 595 ms    Calculated P Axis 68 degrees    Calculated R Axis -56 degrees    Calculated T Axis -136 degrees    Diagnosis       Normal sinus rhythm  Left anterior fascicular block  Anteroseptal infarct , age undetermined  Marked T wave abnormality, consider inferolateral ischemia  Prolonged QT  Abnormal ECG  When compared with ECG of 17-JAN-2020 10:09,  Left bundle branch block is no longer present  Anteroseptal infarct is now present     METABOLIC PANEL, COMPREHENSIVE    Collection Time: 03/22/20 10:40 PM   Result Value Ref Range    Sodium 139 136 - 145 mmol/L    Potassium 3.2 (L) 3.5 - 5.5 mmol/L    Chloride 106 100 - 111 mmol/L    CO2 28 21 - 32 mmol/L    Anion gap 5 3.0 - 18 mmol/L    Glucose 116 (H) 74 - 99 mg/dL    BUN 16 7.0 - 18 MG/DL    Creatinine 1.15 0.6 - 1.3 MG/DL    BUN/Creatinine ratio 14 12 - 20      GFR est AA 59 (L) >60 ml/min/1.73m2    GFR est non-AA 49 (L) >60 ml/min/1.73m2    Calcium 8.9 8.5 - 10.1 MG/DL    Bilirubin, total 0.8 0.2 - 1.0 MG/DL    ALT (SGPT) 20 13 - 56 U/L    AST (SGOT) 16 10 - 38 U/L    Alk. phosphatase 97 45 - 117 U/L    Protein, total 7.3 6.4 - 8.2 g/dL    Albumin 3.5 3.4 - 5.0 g/dL    Globulin 3.8 2.0 - 4.0 g/dL    A-G Ratio 0.9 0.8 - 1.7     CBC WITH AUTOMATED DIFF    Collection Time: 03/22/20 10:40 PM   Result Value Ref Range    WBC 6.9 4.6 - 13.2 K/uL    RBC 4.67 4.20 - 5.30 M/uL    HGB 13.0 12.0 - 16.0 g/dL    HCT 39.2 35.0 - 45.0 %    MCV 83.9 74.0 - 97.0 FL    MCH 27.8 24.0 - 34.0 PG    MCHC 33.2 31.0 - 37.0 g/dL    RDW 13.2 11.6 - 14.5 %    PLATELET 045 472 - 520 K/uL    MPV 10.3 9.2 - 11.8 FL    NEUTROPHILS 70 40 - 73 %    LYMPHOCYTES 22 21 - 52 %    MONOCYTES 6 3 - 10 %    EOSINOPHILS 2 0 - 5 %    BASOPHILS 0 0 - 2 %    ABS. NEUTROPHILS 4.8 1.8 - 8.0 K/UL    ABS. LYMPHOCYTES 1.5 0.9 - 3.6 K/UL    ABS. MONOCYTES 0.4 0.05 - 1.2 K/UL    ABS. EOSINOPHILS 0.1 0.0 - 0.4 K/UL    ABS. BASOPHILS 0.0 0.0 - 0.1 K/UL    DF AUTOMATED     TROPONIN I    Collection Time: 03/22/20 10:40 PM   Result Value Ref Range    Troponin-I, QT 0.68 (H) 0.0 - 0.045 NG/ML   NT-PRO BNP    Collection Time: 03/22/20 10:40 PM   Result Value Ref Range    NT pro-BNP 12,229 (H) 0 - 900 PG/ML   PTT    Collection Time: 03/22/20 10:40 PM   Result Value Ref Range    aPTT 30.4 23.0 - 36.4 SEC       Radiologic Studies -   XR CHEST PORT    (Results Pending)     CT Results  (Last 48 hours)    None        CXR Results  (Last 48 hours)    None            Medical Decision Making   I am the first provider for this patient. I reviewed the vital signs, available nursing notes, past medical history, past surgical history, family history and social history.     Vital Signs-Reviewed the patient's vital signs. Comparison:    Records Reviewed: Nursing Notes, Old Medical Records, Previous electrocardiograms, Previous Radiology Studies and Previous Laboratory Studies    Procedures:  Procedures    Provider Notes (Medical Decision Making): She will EKG inversion of T waves in leads V4, V5 and V6. Initial labs include cardiac work-up as well as congestive heart failure exacerbation work-up. Abnormal troponin reported by laboratory equals 0.68. Combined with EKG changes is concerning for acute coronary syndrome. Non-STEMI.    2341hrs spoke with [facility] cardiology regarding EKG and lab concerns for acute coronary syndrome. 2345hrs Dr. Meda Dance advised patient will be inpatient on telemetry unit. MED RECONCILIATION:  Current Facility-Administered Medications   Medication Dose Route Frequency    heparin (porcine) 1,000 unit/mL injection 4,000 Units  4,000 Units IntraVENous ONCE    heparin 25,000 units in D5W 250 ml infusion  12-25 Units/kg/hr IntraVENous TITRATE    aspirin tablet 325 mg  325 mg Oral NOW     Current Outpatient Medications   Medication Sig    albuterol (PROVENTIL HFA, VENTOLIN HFA, PROAIR HFA) 90 mcg/actuation inhaler Take 2 Puffs by inhalation every four (4) hours as needed for Wheezing or Shortness of Breath. Indications: asthma attack, chronic obstructive pulmonary disease    albuterol (ACCUNEB) 1.25 mg/3 mL nebu Take 3 mL by inhalation every four (4) hours as needed for Wheezing (wheezing).  OXYGEN-AIR DELIVERY SYSTEMS 3 L by IntraNASal route as needed for Other (sob).  ciprofloxacin HCl (CIPRO) 250 mg tablet Take 2 Tabs by mouth two (2) times a day. Indications: Bacterial Urinary Tract Infection    ibuprofen (MOTRIN) 800 mg tablet Take 1 Tab by mouth three (3) times daily as needed for Pain.  docusate sodium (COLACE) 100 mg capsule Take 1 Cap by mouth two (2) times a day.     ondansetron (ZOFRAN ODT) 4 mg disintegrating tablet Take 1 Tab by mouth every eight (8) hours as needed for Nausea. Indications: prevent nausea and vomiting after surgery    hydroCHLOROthiazide (HYDRODIURIL) 50 mg tablet Take 25 mg by mouth daily.  QUEtiapine (SEROQUEL) 300 mg tablet Take 300 mg by mouth nightly. Indications: States she lost her daughter    fluticasone furoate-vilanterol (BREO ELLIPTA) 200-25 mcg/dose inhaler Take 1 Puff by inhalation daily.  lisinopril (PRINIVIL, ZESTRIL) 20 mg tablet Take 20 mg by mouth daily.  amLODIPine (NORVASC) 10 mg tablet Take 1 Tab by mouth daily.  arformoterol (BROVANA) 15 mcg/2 mL nebu neb solution 2 mL by Nebulization route two (2) times a day.  budesonide (PULMICORT) 0.5 mg/2 mL nbsp 2 mL by Nebulization route two (2) times a day.  tiotropium (SPIRIVA) 18 mcg inhalation capsule Take 1 Cap by inhalation daily.  atorvastatin (LIPITOR) 20 mg tablet Take 1 Tab by mouth nightly.  cloNIDine HCl (CATAPRES) 0.2 mg tablet Take 1 Tab by mouth two (2) times a day.  escitalopram oxalate (LEXAPRO) 10 mg tablet Take 1 Tab by mouth every evening.  metoprolol tartrate (LOPRESSOR) 25 mg tablet Take 0.5 Tabs by mouth every twelve (12) hours.  roflumilast (DALIRESP) 500 mcg tab tablet Take 1 Tab by mouth daily.  OTHER Incentive spirometry- use as directed    naloxone (NARCAN) 4 mg/actuation nasal spray Use 1 spray intranasally, then discard. Repeat with new spray every 2 min as needed for opioid overdose symptoms, alternating nostrils.  budesonide-formoterol (SYMBICORT) 160-4.5 mcg/actuation HFAA Take 2 Puffs by inhalation two (2) times a day.  famotidine (PEPCID) 20 mg tablet Take 1 Tab by mouth daily.  montelukast (SINGULAIR) 10 mg tablet Take 1 Tab by mouth nightly. Disposition:  Admission to telemetry      Follow-up Information    None         Current Discharge Medication List              Diagnosis     Clinical Impression:   1. NSTEMI (non-ST elevated myocardial infarction) ([facility] [address]    2.  ACS (acute coronary syndrome) ([facility]    3.  Acute respiratory distress

## 2020-03-23 NOTE — ED NOTES
Report given to Manav Altamirano, [facility].  No questions or concerns after report

## 2020-03-23 NOTE — ED NOTES
Pt was just admitted to the hospital for ACS/NSTEMI yesterday. She left AMA from the floor this morning after several verbal altercations with the dr and nurse. Pt was upset as she was placed on droplet precautions, secondary to her cough and SOB. She was angry the door was being kept closed, and felt as though the nurse wasn't bathing/changing her sheets as often as she should be doing. Pt was escorted off the floor by security, but now presents to the ED with complaints of continued SOB and chest pain. Pt states \"I don't like that doctor or nurse, please don't take me back to them. \" No other complaints reported at this time.  Vangie Edwards PA-C

## 2020-03-23 NOTE — Clinical Note
TRANSFER - IN REPORT:     Verbal report received from: Tarah Castillo RN. Report consisted of patient's Situation, Background, Assessment and   Recommendations(SBAR). Opportunity for questions and clarification was provided. Assessment completed upon patient's arrival to unit and care assumed. Patient transported with a Cardiac Cath Tech / Patient Care Tech.

## 2020-03-23 NOTE — ED NOTES
Pt refused ordered medications R/T wanting methadone; explained to pt that she received methadone at 0402 this am. Pt fussing and cussing.  Informed md of above

## 2020-03-23 NOTE — ROUTINE PROCESS
0440-Pt arrived to the unit crying in pain, nauseated, c/o abd pain and chest pain, on nitro gtt and Heparin gtt. No SOB noted on room air, sats 97%, no evidence of bleeding, pt guarding abd, pending KUB. 0530-Pt votmied on herself, voided on herself, unable to get UDS at this time, pt crying, yelling, wants MD at the bedside, paged MD.  
 
0540-MD at bedside, orders to put NGT in, pt unable to tolerate, orders for STAT CT of Abd with contract, new IV placed in Right AC. Pt taken down with nursing supervisor on monitor, emergency kit with us.  
 
0620-Pt returned back to the floor, resting, no apparent pain or SOB at this time, pt resting with eyes closed, no n/v at this time. Pt is coughing up thick yellow mucous. 0700-Bedside and verbal report given to Raf Melara RN.  Pt resting, PTT therapeutic, lactic normal.

## 2020-03-23 NOTE — ROUTINE PROCESS
0830: Received client from [facility]. Client came in from the ED with abdominal pain and guarding, emesis and productive cough with thick yellow sputum. 0845:Limited assessment performed, Pt right side lying with abdominal guarding and legs pulled up. Unable to perform full assessment on pt due to refusal. 
0900:Lasix and Hydralazine administered to pt while still in the side-lying position, client NPO with emesis so oral meds held. 0951:Pt yelling from inside room. Entered and asked what was needed, also informed to please use her call bell. Client then stated that she did not want to be seen by the \"little white (expletive) nurse or the doctor. \" Client reported that she was left alone in the room without food, and her bed was wet. Client was also using expletives towards me, I asked her to please correct her language, and she said that she no longer wanted myself, Lisseth or Dr. Rasheed Murdock to work with her. She expressed wanting to leave and wanting her door opened despite being on droplet precaution. Security was called to escort client who wants to leave AMA. Client refusing to sign AMA paperwork Sergio Turner RN took over care of pt to assist her in leaving the floor.

## 2020-03-23 NOTE — Clinical Note
TRANSFER - OUT REPORT:     Verbal report given to: Emily Clifton RN. Report consisted of patient's Situation, Background, Assessment and   Recommendations(SBAR). Opportunity for questions and clarification was provided. Patient transported with a Cardiac Cath Tech / Patient Care Tech. Patient transported to: [address]

## 2020-03-23 NOTE — ED NOTES
I personally saw and examined the patient. I have reviewed and agree with the MLP's findings, including all diagnostic interpretations, and plans as written. I was present during the key portions of separately billed procedures. Repeat EKG showed sinus rhythm at a rate of 83 with ST elevations on lead V3, V4 and V5. I discussed case with hospitalist.  I also discussed the case with Dr. John Mims cardiologist.  He was scrubbed in the case and wanted us to page Dr. Ashley Daley, which he could not get a hold of. Dr. Day Lester reviewed the EKG and stated patient did not meet STEMI criteria. Patient's bed will be reassigned to telemetry.   [address], DO

## 2020-03-24 ENCOUNTER — APPOINTMENT (OUTPATIENT)
Dept: NON INVASIVE DIAGNOSTICS | Age: 55
DRG: 190 | End: 2020-03-24
Attending: INTERNAL MEDICINE
Payer: COMMERCIAL

## 2020-03-24 LAB
ANION GAP SERPL CALC-SCNC: 6 MMOL/L (ref 3–18)
APTT PPP: 110.2 SEC (ref 23–36.4)
APTT PPP: 51 SEC (ref 23–36.4)
APTT PPP: 54.6 SEC (ref 23–36.4)
BASOPHILS # BLD: 0 K/UL (ref 0–0.1)
BASOPHILS NFR BLD: 0 % (ref 0–2)
BUN SERPL-MCNC: 23 MG/DL (ref 7–18)
BUN/CREAT SERPL: 15 (ref 12–20)
CALCIUM SERPL-MCNC: 9.6 MG/DL (ref 8.5–10.1)
CHLORIDE SERPL-SCNC: 103 MMOL/L (ref 100–111)
CO2 SERPL-SCNC: 29 MMOL/L (ref 21–32)
CREAT SERPL-MCNC: 1.5 MG/DL (ref 0.6–1.3)
DIFFERENTIAL METHOD BLD: ABNORMAL
ECHO AO ROOT DIAM: 2.67 CM
ECHO LA AREA 4C: 16.3 CM2
ECHO LA VOL 4C: 44.06 ML (ref 22–52)
ECHO LA VOL BP: 52.56 ML (ref 22–52)
ECHO LA VOL/BSA BIPLANE: 31.59 ML/M2 (ref 16–28)
ECHO LA VOLUME INDEX A4C: 26.48 ML/M2 (ref 16–28)
ECHO LV EDV TEICHHOLZ: 0.33 ML
ECHO LV ESV TEICHHOLZ: 0.24 ML
ECHO LV INTERNAL DIMENSION DIASTOLIC: 3.45 CM (ref 3.9–5.3)
ECHO LV INTERNAL DIMENSION SYSTOLIC: 3.04 CM
ECHO LV IVSD: 1.12 CM (ref 0.6–0.9)
ECHO LV MASS 2D: 152.4 G (ref 67–162)
ECHO LV MASS INDEX 2D: 91.6 G/M2 (ref 43–95)
ECHO LV POSTERIOR WALL DIASTOLIC: 1.29 CM (ref 0.6–0.9)
ECHO LVOT DIAM: 2.13 CM
ECHO LVOT PEAK GRADIENT: 4.2 MMHG
ECHO LVOT PEAK VELOCITY: 102.98 CM/S
ECHO LVOT VTI: 16.26 CM
EOSINOPHIL # BLD: 0 K/UL (ref 0–0.4)
EOSINOPHIL NFR BLD: 0 % (ref 0–5)
ERYTHROCYTE [DISTWIDTH] IN BLOOD BY AUTOMATED COUNT: 13.2 % (ref 11.6–14.5)
GLUCOSE SERPL-MCNC: 131 MG/DL (ref 74–99)
HCT VFR BLD AUTO: 44 % (ref 35–45)
HGB BLD-MCNC: 14.6 G/DL (ref 12–16)
LVFS 2D: 11.73 %
LVOT MG: 2.21 MMHG
LVOT MV: 0.69 CM/S
LVSV (TEICH): 7.44 ML
LYMPHOCYTES # BLD: 1.2 K/UL (ref 0.9–3.6)
LYMPHOCYTES NFR BLD: 16 % (ref 21–52)
MCH RBC QN AUTO: 27.7 PG (ref 24–34)
MCHC RBC AUTO-ENTMCNC: 33.2 G/DL (ref 31–37)
MCV RBC AUTO: 83.5 FL (ref 74–97)
MONOCYTES # BLD: 0.2 K/UL (ref 0.05–1.2)
MONOCYTES NFR BLD: 2 % (ref 3–10)
NEUTS SEG # BLD: 6.2 K/UL (ref 1.8–8)
NEUTS SEG NFR BLD: 82 % (ref 40–73)
PLATELET # BLD AUTO: 280 K/UL (ref 135–420)
PMV BLD AUTO: 11.3 FL (ref 9.2–11.8)
POTASSIUM SERPL-SCNC: 3.7 MMOL/L (ref 3.5–5.5)
RBC # BLD AUTO: 5.27 M/UL (ref 4.2–5.3)
SODIUM SERPL-SCNC: 138 MMOL/L (ref 136–145)
WBC # BLD AUTO: 7.5 K/UL (ref 4.6–13.2)

## 2020-03-24 PROCEDURE — 74011250636 HC RX REV CODE- 250/636: Performed by: EMERGENCY MEDICINE

## 2020-03-24 PROCEDURE — 74011250637 HC RX REV CODE- 250/637: Performed by: INTERNAL MEDICINE

## 2020-03-24 PROCEDURE — 85730 THROMBOPLASTIN TIME PARTIAL: CPT

## 2020-03-24 PROCEDURE — 85025 COMPLETE CBC W/AUTO DIFF WBC: CPT

## 2020-03-24 PROCEDURE — 65270000029 HC RM PRIVATE

## 2020-03-24 PROCEDURE — 74011000250 HC RX REV CODE- 250: Performed by: INTERNAL MEDICINE

## 2020-03-24 PROCEDURE — 80048 BASIC METABOLIC PNL TOTAL CA: CPT

## 2020-03-24 PROCEDURE — 74011250636 HC RX REV CODE- 250/636: Performed by: INTERNAL MEDICINE

## 2020-03-24 PROCEDURE — 36415 COLL VENOUS BLD VENIPUNCTURE: CPT

## 2020-03-24 PROCEDURE — 94761 N-INVAS EAR/PLS OXIMETRY MLT: CPT

## 2020-03-24 PROCEDURE — 93306 TTE W/DOPPLER COMPLETE: CPT

## 2020-03-24 PROCEDURE — 94640 AIRWAY INHALATION TREATMENT: CPT

## 2020-03-24 PROCEDURE — 74011250637 HC RX REV CODE- 250/637: Performed by: COLON & RECTAL SURGERY

## 2020-03-24 RX ORDER — METOPROLOL TARTRATE 5 MG/5ML
2.5 INJECTION INTRAVENOUS
Status: DISCONTINUED | OUTPATIENT
Start: 2020-03-24 | End: 2020-03-26 | Stop reason: HOSPADM

## 2020-03-24 RX ORDER — DEXTROSE MONOHYDRATE 50 MG/ML
50 INJECTION, SOLUTION INTRAVENOUS CONTINUOUS
Status: DISCONTINUED | OUTPATIENT
Start: 2020-03-24 | End: 2020-03-26

## 2020-03-24 RX ORDER — ATORVASTATIN CALCIUM 20 MG/1
20 TABLET, FILM COATED ORAL
Status: DISCONTINUED | OUTPATIENT
Start: 2020-03-24 | End: 2020-03-26 | Stop reason: HOSPADM

## 2020-03-24 RX ORDER — ARFORMOTEROL TARTRATE 15 UG/2ML
15 SOLUTION RESPIRATORY (INHALATION)
Status: DISCONTINUED | OUTPATIENT
Start: 2020-03-24 | End: 2020-03-26 | Stop reason: HOSPADM

## 2020-03-24 RX ORDER — NALOXONE HYDROCHLORIDE 0.4 MG/ML
0.4 INJECTION, SOLUTION INTRAMUSCULAR; INTRAVENOUS; SUBCUTANEOUS AS NEEDED
Status: DISCONTINUED | OUTPATIENT
Start: 2020-03-24 | End: 2020-03-26 | Stop reason: HOSPADM

## 2020-03-24 RX ORDER — ACETAMINOPHEN 325 MG/1
650 TABLET ORAL
Status: DISCONTINUED | OUTPATIENT
Start: 2020-03-24 | End: 2020-03-26 | Stop reason: HOSPADM

## 2020-03-24 RX ORDER — DOCUSATE SODIUM 100 MG/1
200 CAPSULE, LIQUID FILLED ORAL 2 TIMES DAILY
Status: DISCONTINUED | OUTPATIENT
Start: 2020-03-24 | End: 2020-03-26 | Stop reason: HOSPADM

## 2020-03-24 RX ORDER — METOPROLOL TARTRATE 25 MG/1
12.5 TABLET, FILM COATED ORAL EVERY 12 HOURS
Status: DISCONTINUED | OUTPATIENT
Start: 2020-03-24 | End: 2020-03-24

## 2020-03-24 RX ORDER — ESCITALOPRAM OXALATE 10 MG/1
10 TABLET ORAL EVERY EVENING
Status: DISCONTINUED | OUTPATIENT
Start: 2020-03-24 | End: 2020-03-26 | Stop reason: HOSPADM

## 2020-03-24 RX ORDER — HYDROMORPHONE HYDROCHLORIDE 1 MG/ML
0.5 INJECTION, SOLUTION INTRAMUSCULAR; INTRAVENOUS; SUBCUTANEOUS ONCE
Status: COMPLETED | OUTPATIENT
Start: 2020-03-24 | End: 2020-03-24

## 2020-03-24 RX ORDER — MORPHINE SULFATE 2 MG/ML
2 INJECTION, SOLUTION INTRAMUSCULAR; INTRAVENOUS ONCE
Status: COMPLETED | OUTPATIENT
Start: 2020-03-24 | End: 2020-03-24

## 2020-03-24 RX ORDER — HYDROMORPHONE HYDROCHLORIDE 1 MG/ML
0.5 INJECTION, SOLUTION INTRAMUSCULAR; INTRAVENOUS; SUBCUTANEOUS
Status: DISCONTINUED | OUTPATIENT
Start: 2020-03-24 | End: 2020-03-24

## 2020-03-24 RX ORDER — BUDESONIDE 0.5 MG/2ML
500 INHALANT ORAL
Status: DISCONTINUED | OUTPATIENT
Start: 2020-03-24 | End: 2020-03-26 | Stop reason: HOSPADM

## 2020-03-24 RX ORDER — HEPARIN SODIUM 1000 [USP'U]/ML
3000 INJECTION, SOLUTION INTRAVENOUS; SUBCUTANEOUS ONCE
Status: COMPLETED | OUTPATIENT
Start: 2020-03-24 | End: 2020-03-24

## 2020-03-24 RX ORDER — HEPARIN SODIUM 1000 [USP'U]/ML
3000 INJECTION, SOLUTION INTRAVENOUS; SUBCUTANEOUS ONCE
Status: DISCONTINUED | OUTPATIENT
Start: 2020-03-24 | End: 2020-03-24

## 2020-03-24 RX ORDER — MONTELUKAST SODIUM 10 MG/1
10 TABLET ORAL
Status: DISCONTINUED | OUTPATIENT
Start: 2020-03-24 | End: 2020-03-26 | Stop reason: HOSPADM

## 2020-03-24 RX ORDER — METOPROLOL TARTRATE 25 MG/1
12.5 TABLET, FILM COATED ORAL EVERY 12 HOURS
Status: DISCONTINUED | OUTPATIENT
Start: 2020-03-24 | End: 2020-03-26 | Stop reason: HOSPADM

## 2020-03-24 RX ORDER — DICYCLOMINE HYDROCHLORIDE 10 MG/5ML
10 SOLUTION ORAL
Status: DISCONTINUED | OUTPATIENT
Start: 2020-03-24 | End: 2020-03-26 | Stop reason: HOSPADM

## 2020-03-24 RX ORDER — DOCUSATE SODIUM 100 MG/1
100 CAPSULE, LIQUID FILLED ORAL 2 TIMES DAILY
Status: DISCONTINUED | OUTPATIENT
Start: 2020-03-24 | End: 2020-03-24

## 2020-03-24 RX ORDER — MORPHINE SULFATE 2 MG/ML
1 INJECTION, SOLUTION INTRAMUSCULAR; INTRAVENOUS
Status: DISCONTINUED | OUTPATIENT
Start: 2020-03-24 | End: 2020-03-24

## 2020-03-24 RX ORDER — ARFORMOTEROL TARTRATE 15 UG/2ML
15 SOLUTION RESPIRATORY (INHALATION) 2 TIMES DAILY
Status: DISCONTINUED | OUTPATIENT
Start: 2020-03-24 | End: 2020-03-24

## 2020-03-24 RX ORDER — HYDROMORPHONE HYDROCHLORIDE 1 MG/ML
0.5 INJECTION, SOLUTION INTRAMUSCULAR; INTRAVENOUS; SUBCUTANEOUS
Status: DISCONTINUED | OUTPATIENT
Start: 2020-03-24 | End: 2020-03-25

## 2020-03-24 RX ORDER — ALBUTEROL SULFATE 1.25 MG/3ML
1.25 SOLUTION RESPIRATORY (INHALATION)
Status: DISCONTINUED | OUTPATIENT
Start: 2020-03-24 | End: 2020-03-24

## 2020-03-24 RX ORDER — AMLODIPINE BESYLATE 5 MG/1
10 TABLET ORAL DAILY
Status: DISCONTINUED | OUTPATIENT
Start: 2020-03-24 | End: 2020-03-25

## 2020-03-24 RX ADMIN — ESCITALOPRAM OXALATE 10 MG: 10 TABLET ORAL at 21:22

## 2020-03-24 RX ADMIN — IPRATROPIUM BROMIDE AND ALBUTEROL SULFATE 3 ML: .5; 3 SOLUTION RESPIRATORY (INHALATION) at 11:27

## 2020-03-24 RX ADMIN — ONDANSETRON 4 MG: 2 INJECTION INTRAMUSCULAR; INTRAVENOUS at 12:24

## 2020-03-24 RX ADMIN — HEPARIN SODIUM 1400 UNITS/HR: 10000 INJECTION, SOLUTION INTRAVENOUS at 18:37

## 2020-03-24 RX ADMIN — METOPROLOL TARTRATE 2.5 MG: 5 INJECTION INTRAVENOUS at 17:56

## 2020-03-24 RX ADMIN — METOPROLOL TARTRATE 2.5 MG: 5 INJECTION INTRAVENOUS at 06:10

## 2020-03-24 RX ADMIN — BUDESONIDE 500 MCG: 0.5 INHALANT RESPIRATORY (INHALATION) at 20:13

## 2020-03-24 RX ADMIN — IPRATROPIUM BROMIDE AND ALBUTEROL SULFATE 3 ML: .5; 3 SOLUTION RESPIRATORY (INHALATION) at 20:13

## 2020-03-24 RX ADMIN — AMLODIPINE BESYLATE 10 MG: 5 TABLET ORAL at 12:25

## 2020-03-24 RX ADMIN — MONTELUKAST 10 MG: 10 TABLET, FILM COATED ORAL at 21:22

## 2020-03-24 RX ADMIN — IPRATROPIUM BROMIDE AND ALBUTEROL SULFATE 3 ML: .5; 3 SOLUTION RESPIRATORY (INHALATION) at 15:23

## 2020-03-24 RX ADMIN — KETOROLAC TROMETHAMINE 15 MG: 15 INJECTION, SOLUTION INTRAMUSCULAR; INTRAVENOUS at 12:25

## 2020-03-24 RX ADMIN — METOPROLOL TARTRATE 12.5 MG: 25 TABLET, FILM COATED ORAL at 12:24

## 2020-03-24 RX ADMIN — METOPROLOL TARTRATE 12.5 MG: 25 TABLET, FILM COATED ORAL at 21:22

## 2020-03-24 RX ADMIN — KETOROLAC TROMETHAMINE 15 MG: 15 INJECTION, SOLUTION INTRAMUSCULAR; INTRAVENOUS at 17:56

## 2020-03-24 RX ADMIN — MORPHINE SULFATE 2 MG: 2 INJECTION, SOLUTION INTRAMUSCULAR; INTRAVENOUS at 02:13

## 2020-03-24 RX ADMIN — HYDROMORPHONE HYDROCHLORIDE 0.5 MG: 1 INJECTION, SOLUTION INTRAMUSCULAR; INTRAVENOUS; SUBCUTANEOUS at 17:56

## 2020-03-24 RX ADMIN — ONDANSETRON 4 MG: 2 INJECTION INTRAMUSCULAR; INTRAVENOUS at 21:22

## 2020-03-24 RX ADMIN — HYDROMORPHONE HYDROCHLORIDE 0.5 MG: 1 INJECTION, SOLUTION INTRAMUSCULAR; INTRAVENOUS; SUBCUTANEOUS at 09:38

## 2020-03-24 RX ADMIN — HEPARIN SODIUM 3000 UNITS: 1000 INJECTION, SOLUTION INTRAVENOUS; SUBCUTANEOUS at 02:31

## 2020-03-24 RX ADMIN — HYDROMORPHONE HYDROCHLORIDE 0.5 MG: 1 INJECTION, SOLUTION INTRAMUSCULAR; INTRAVENOUS; SUBCUTANEOUS at 15:19

## 2020-03-24 RX ADMIN — HYDROMORPHONE HYDROCHLORIDE 0.5 MG: 1 INJECTION, SOLUTION INTRAMUSCULAR; INTRAVENOUS; SUBCUTANEOUS at 12:24

## 2020-03-24 RX ADMIN — ONDANSETRON 4 MG: 2 INJECTION INTRAMUSCULAR; INTRAVENOUS at 02:31

## 2020-03-24 RX ADMIN — ATORVASTATIN CALCIUM 20 MG: 20 TABLET, FILM COATED ORAL at 21:22

## 2020-03-24 RX ADMIN — METHYLPREDNISOLONE SODIUM SUCCINATE 40 MG: 40 INJECTION, POWDER, FOR SOLUTION INTRAMUSCULAR; INTRAVENOUS at 06:10

## 2020-03-24 RX ADMIN — ARFORMOTEROL TARTRATE 15 MCG: 15 SOLUTION RESPIRATORY (INHALATION) at 20:13

## 2020-03-24 RX ADMIN — HYDROMORPHONE HYDROCHLORIDE 0.5 MG: 1 INJECTION, SOLUTION INTRAMUSCULAR; INTRAVENOUS; SUBCUTANEOUS at 21:22

## 2020-03-24 RX ADMIN — HEPARIN SODIUM 3000 UNITS: 1000 INJECTION, SOLUTION INTRAVENOUS; SUBCUTANEOUS at 12:43

## 2020-03-24 RX ADMIN — DOCUSATE SODIUM 200 MG: 100 CAPSULE, LIQUID FILLED ORAL at 17:56

## 2020-03-24 RX ADMIN — ONDANSETRON 4 MG: 2 INJECTION INTRAMUSCULAR; INTRAVENOUS at 06:16

## 2020-03-24 RX ADMIN — HYDROMORPHONE HYDROCHLORIDE 0.5 MG: 1 INJECTION, SOLUTION INTRAMUSCULAR; INTRAVENOUS; SUBCUTANEOUS at 08:31

## 2020-03-24 RX ADMIN — KETOROLAC TROMETHAMINE 15 MG: 15 INJECTION, SOLUTION INTRAMUSCULAR; INTRAVENOUS at 23:38

## 2020-03-24 RX ADMIN — KETOROLAC TROMETHAMINE 15 MG: 15 INJECTION, SOLUTION INTRAMUSCULAR; INTRAVENOUS at 06:10

## 2020-03-24 RX ADMIN — DOCUSATE SODIUM 100 MG: 100 CAPSULE, LIQUID FILLED ORAL at 12:25

## 2020-03-24 NOTE — ROUTINE PROCESS
0723..the patient agreeable to wear cardiac monitor. Pt in NSR in 70s. Bedside and Verbal shift change report given to Davy Meadows (oncoming nurse) by Ezella Goldmann, RN (offgoing nurse). Report included the following information SBAR, Kardex, Intake/Output, MAR, Recent Results, Med Rec Status and Cardiac Rhythm sinus rhythm.

## 2020-03-24 NOTE — PROGRESS NOTES
Problem: Falls - Risk of  Goal: *Absence of Falls  Description: Document Edgar Brunner Fall Risk and appropriate interventions in the flowsheet.   Outcome: Not Progressing Towards Goal  Note: Fall Risk Interventions:            Medication Interventions: Patient to call before getting OOB, Teach patient to arise slowly    Elimination Interventions: Call light in reach, Patient to call for help with toileting needs              Problem: Patient Education: Go to Patient Education Activity  Goal: Patient/Family Education  Outcome: Not Progressing Towards Goal

## 2020-03-24 NOTE — ROUTINE PROCESS
Bedside and Verbal shift change report given to Alejandrina Campso RN (oncoming nurse) by Fariba Rosado RN (offgoing nurse). Report included the following information SBAR, Kardex and MAR.

## 2020-03-24 NOTE — PROGRESS NOTES
HPI: Jessica Georges is a 53 y.o. female presenting with chief complain of abdominal pain, vomiting. Pt states she has had diffuse, sharp, severe abdominal pain for the last week. She has been moving bowels and passing flatus but has also had bilious emesis. Pt states pain similar to when hospitalized previously for hernia and sbo. She also c/o sob on presentation to the ED. Denies fevers, nausea. States all pain medication not helping. Lactate normal yesterday. CT showed possible mild psbo. Enemas given today.      Past Medical History:   Diagnosis Date    Asthma     CHF (congestive heart failure) (HCC)     Chronic obstructive pulmonary disease (HCC)     Dependence on supplemental oxygen     Endocrine disease     thyroid issues    Gastrointestinal disorder     \"blockage in my stomach\"    Hypercholesterolemia     Hypertension        Past Surgical History:   Procedure Laterality Date    HX GI      Exploratory laparotomy with lysis of adhesions and primary repair of incarcerated umbilical hernia       [address] negative for GI disorders    Social History     Socioeconomic History    Marital status: SINGLE     Spouse name: Not on file    Number of children: Not on file    Years of education: Not on file    Highest education level: Not on file   Tobacco Use    Smoking status: Current Every Day Smoker     Packs/day: 0.25    Smokeless tobacco: Former User   Substance and Sexual Activity    Alcohol use: No     Comment: socially    Drug use: Not Currently     Types: Heroin    Sexual activity: Never     Comment: last used 9 years ago       Review of Systems - aside from above all others negative        No Known Allergies    Vitals:    03/24/20 0738 03/24/20 1058 03/24/20 1127 03/24/20 1129   BP: 155/88 155/88  (!) 216/100   Pulse: 87   98   Resp: 18   20   Temp: 98.1 °F (36.7 °C)   97.5 °F (36.4 °C)   SpO2: 99%  98% 98%   Weight:  71.2 kg (157 lb)     Height:  4' 11\" (1.499 m)     LMP: 04/14/2009       Physical Exam  Constitutional:       Appearance: She is well-developed. HENT:      Head: Normocephalic and atraumatic. Eyes:      Conjunctiva/sclera: Conjunctivae normal.   Abdominal:      General: There is no distension. Palpations: Abdomen is soft. Tenderness: There is abdominal tenderness (moderate, diffuse). There is no guarding. Musculoskeletal: Normal range of motion. Lymphadenopathy:      Cervical: No cervical adenopathy. Skin:     General: Skin is warm and dry. Findings: No rash. Neurological:      Sensory: No sensory deficit. Psychiatric:         Speech: Speech normal.       CMP:   Lab Results   Component Value Date/Time     03/24/2020 01:35 AM    K 3.7 03/24/2020 01:35 AM     03/24/2020 01:35 AM    CO2 29 03/24/2020 01:35 AM    AGAP 6 03/24/2020 01:35 AM     (H) 03/24/2020 01:35 AM    BUN 23 (H) 03/24/2020 01:35 AM    CREA 1.50 (H) 03/24/2020 01:35 AM    GFRAA 44 (L) 03/24/2020 01:35 AM    GFRNA 36 (L) 03/24/2020 01:35 AM    CA 9.6 03/24/2020 01:35 AM     CBC:   Lab Results   Component Value Date/Time    WBC 7.5 03/24/2020 01:35 AM    HGB 14.6 03/24/2020 01:35 AM    HCT 44.0 03/24/2020 01:35 AM     03/24/2020 01:35 AM     CT personally visualized; mildly dilated small bowel, stool in colon and air in proximal colon    Assessment / Plan    Possible PSBO  NPO, IVF  Await resolution  This may also be opiod induced constipation  Can do additional enemas from below tomorrow to see if this helps    The diagnoses and plan were discussed with the patient. All questions answered. Plan of care agreed to by all concerned.

## 2020-03-24 NOTE — PROGRESS NOTES
1942..pt is refusing cardiac monitor. Explained purpose of monitor to pt. Still refusing. Will attempt again later.

## 2020-03-24 NOTE — PROGRESS NOTES
SUBJECTIVE:    Patient is constantly asking about pain medications. Her pain is around periumbilical region. No nausea vomiting. She stated she had multiple bowel movements since she has been here. Dyspnea on exertion present. Denies for chest pain currently. No cough. I called methadone clinic but they said they cannot give me any information over the phone and we need to send them release of information. OBJECTIVE:    BP (!) 216/100 (BP 1 Location: Left arm, BP Patient Position: At rest)   Pulse 98   Temp 97.5 °F (36.4 °C)   Resp 20   Ht 4' 11\" (1.499 m)   Wt 71.2 kg (157 lb)   LMP 04/14/2009   SpO2 98%   BMI 31.71 kg/m²     General appearance - alert, well appearing, and in no distress  Eyes - sclera anicteric  Nose - no obvious nasal discharge. Neck - supple, no JVD, trachea is midline  Chest - decreased air entry noted in bases, no wheezes  Heart - S1 and S2 normal  Abdomen - soft, surgical scars present and some tenderness present in periumbilical area, nondistended, Bowel sounds present  Neurological - alert, oriented, normal speech, no focal findings noted while in bed  Musculoskeletal - no joint tenderness or swelling of knees bilaterally  Extremities - no pedal edema noted    ASSESSMENT:    1. Chronic abdominal pain  2. Partial small bowel obstruction based on CT scan  3. Elevated troponin could be demand ischemia secondary to hypertension  4. Uncontrolled hypertension  5. Transient left bundle branch block  6. Dyspnea on exertion due to underlying COPD  7. COPD  8. Dyslipidemia  9. History of prolonged heroin use  10. Tobacco use disorder    PLAN:    CT abdomen pelvis report reviewed it shows findings consistent with partial small bowel obstructions and patient is having bowel movements. I have talked to general surgeon on call. Patient also refused to have NG tube. My plan is to start her on medication by mouth and some liquid diet with monitoring.   Chest x-ray report reviewed and I will stop steroid as it might be contributing to her hypertension and she does not have any wheezing at this point. With regards to non-STEMI, continue heparin drip until patient is seen by cardiologist today. Echocardiogram has been done but the report is pending. With regards to pain management, I talked to patient in presence of nurses that we will do Dilaudid 0.5 mg every 3 hours as needed and Toradol as needed with Bentyl and Tylenol as needed. I have advised nurses to keep calling methadone clinic to find out patient was active with them and what was her current dose. Restart home nebulizer and home medications for dyslipidemia and hypertension    Patient has been advised to comply with current treatment plan. If she does not comply, she can go home [address] Total time to take care of this patient was greater than 35 minutes including reviewing chart, examining the patient, obtaining history from the patient, analyzing the data, coordinating the care with family, nurses and consultant, and documentation. Disclaimer: Sections of this note are dictated using utilizing voice recognition software, which may have resulted in some phonetic based errors in grammar and contents. Even though attempts were made to correct all the mistakes, some may have been missed, and remained in the body of the document. If questions arise, please contact our department.     BMP:   Lab Results   Component Value Date/Time     03/24/2020 01:35 AM    K 3.7 03/24/2020 01:35 AM     03/24/2020 01:35 AM    CO2 29 03/24/2020 01:35 AM    AGAP 6 03/24/2020 01:35 AM     (H) 03/24/2020 01:35 AM    BUN 23 (H) 03/24/2020 01:35 AM    CREA 1.50 (H) 03/24/2020 01:35 AM    GFRAA 44 (L) 03/24/2020 01:35 AM    GFRNA 36 (L) 03/24/2020 01:35 AM     CBC:   Lab Results   Component Value Date/Time    WBC 7.5 03/24/2020 01:35 AM    HGB 14.6 03/24/2020 01:35 AM    HCT 44.0 03/24/2020 01:35 AM    PLT 280 03/24/2020 01:35 AM     All Cardiac Markers in the last 24 hours: No results found for: CPK, CK, CKMMB, CKMB, RCK3, CKMBT, CKNDX, CKND1, JOSE, TROPT, TROIQ, SURY, TROPT, TNIPOC, BNP, BNPP

## 2020-03-24 NOTE — PROGRESS NOTES
Pt admitted for SOB. Pt refused antihypertensives during day shift. Will review chart to see what pt has ordered, will contact hospitalist if no prn or scheduled med.

## 2020-03-24 NOTE — PROGRESS NOTES
attempted to visit patient and was not able to do a spiritual assessment. Patient was not available. Will follow up with patient as needed.   [id number] [facility] Department  [phone]

## 2020-03-24 NOTE — PROGRESS NOTES
Problem: Falls - Risk of  Goal: *Absence of Falls  Description: Document Donna Jain Fall Risk and appropriate interventions in the flowsheet.   Outcome: Not Progressing Towards Goal  Note: Fall Risk Interventions:            Medication Interventions: Patient to call before getting OOB, Teach patient to arise slowly    Elimination Interventions: Call light in reach, Bed/chair exit alarm              Problem: Patient Education: Go to Patient Education Activity  Goal: Patient/Family Education  Outcome: Not Progressing Towards Goal     Problem: AMI: Day 1  Goal: Off Pathway (Use only if patient is Off Pathway)  Outcome: Not Progressing Towards Goal  Goal: Activity/Safety  Outcome: Not Progressing Towards Goal  Goal: Consults, if ordered  Outcome: Not Progressing Towards Goal  Goal: Diagnostic Test/Procedures  Outcome: Not Progressing Towards Goal  Goal: Nutrition/Diet  Outcome: Not Progressing Towards Goal  Goal: Discharge Planning  Outcome: Not Progressing Towards Goal  Goal: Medications  Outcome: Not Progressing Towards Goal  Goal: Respiratory  Outcome: Not Progressing Towards Goal  Goal: Treatments/Interventions/Procedures  Outcome: Not Progressing Towards Goal  Goal: Psychosocial  Outcome: Not Progressing Towards Goal  Goal: *Eligible patient receives reperfusion therapy thrombolytics/PCI  Outcome: Not Progressing Towards Goal  Goal: *Optimal pain control at patient's stated goal  Outcome: Not Progressing Towards Goal  Goal: *Hemodynamically stable  Outcome: Not Progressing Towards Goal  Goal: *Received aspirin and beta-blocker within 24 hours of arrival unless contraindicated  Outcome: Not Progressing Towards Goal     Problem: Nutrition Deficit  Goal: *Optimize nutritional status  Outcome: Not Progressing Towards Goal

## 2020-03-24 NOTE — PROGRESS NOTES
NUTRITION    Nursing Referral: [facility]     RECOMMENDATIONS / PLAN:     - Monitor GI symptoms and readiness for diet initiation.  - Continue RD inpatient monitoring and evaluation. NUTRITION INTERVENTIONS & DIAGNOSIS:     - Meals/snacks: NPO    Nutrition Diagnosis: Inadequate oral intake related to altered GI function as evidenced by pt NPO with PSBO per CT imaging     ASSESSMENT:     Pt admitted yesterday for SBO, hypertensive emergency and SOB but left AMA. Pt readmitted & off floor during visit. Per chart hard BM today, hx of methadone use and wt loss x year. CT yesterday showing PSBO, likely not a high-grade obstruction, currently NPO without NGT & question pt compliance with placement. Nutritional intake adequate to meet patients estimated nutritional needs:  No    Diet: DIET NPO      Food Allergies: NKFA  Current Appetite: Not Applicable - NPO  Appetite/meal intake prior to admission: Unknown  Feeding Limitations:  [] Swallowing difficulty    [] Chewing difficulty    [] Other:  Current Meal Intake:   Patient Vitals for the past 100 hrs:   % Diet Eaten   03/23/20 1826 0 %       BM: 3/24- hard  Skin Integrity: WDL  Edema:   [x] No     [] Yes   Pertinent Medications: Reviewed: steroid, ondansetron    Recent Labs     03/24/20  0135 03/23/20  1122 03/22/20  2240    138 139   K 3.7 3.8 3.2*    103 106   CO2 29 28 28   * 151* 116*   BUN 23* 16 16   CREA 1.50* 1.15 1.15   CA 9.6 9.7 8.9   ALB  --   --  3.5   SGOT  --   --  16   ALT  --   --  20       Intake/Output Summary (Last 24 hours) at 3/24/2020 1109  Last data filed at 3/24/2020 0705  Gross per 24 hour   Intake 0 ml   Output 1 ml   Net -1 ml       Anthropometrics:  Ht Readings from Last 1 Encounters:   03/24/20 4' 11\" (1.499 m)     Last 3 Recorded Weights in this Encounter    03/23/20 1050 03/24/20 1058   Weight: 71.2 kg (157 lb) 71.2 kg (157 lb)     Body mass index is 31.71 kg/m².    Obese Class I    Weight History: Pt unavailable during initial assessment. Per chart review -55#, 25.9% x year PTA    Weight Metrics 3/24/2020 3/23/2020 3/22/2020 1/17/2020 12/20/2019 11/25/2019 11/21/2019   Weight 157 lb - 157 lb 165 lb 170 lb 178 lb 179 lb   BMI - 31.71 kg/m2 31.71 kg/m2 33.33 kg/m2 34.34 kg/m2 35.95 kg/m2 36.15 kg/m2        Admitting Diagnosis: CHF (congestive heart failure) ([facility]) [I50.9]  NSTEMI (non-ST elevated myocardial infarction) ([facility]) [I21.4]  SBO (small [facility] obstruction) [facility] [K56.609]  Pertinent PMHx: CHF, COPD, GI disorder, HTN, hypercholesterolemia    Education Needs:        [x] None identified  [] Identified - Not appropriate at this time  []  Identified and addressed - refer to education log  Learning Limitations:   [x] None identified  [] Identified    Cultural, Amish & ethnic food preferences:  [x] None identified    [] Identified and addressed     ESTIMATED NUTRITION NEEDS:     Calories: 3327-4998 kcal (MSJx1.2-1.4) based on  [x] Actual BW: 71 kg      [] IBW   Protein: 57-71 gm (0.8-1 gm/kg) based on  [x] Actual BW      [] IBW   Fluid: 1 mL/kcal     MONITORING & EVALUATION:     Nutrition Goal(s):   - Nutritional needs will be met through adequate oral intake or nutrition support within the next 5-7 days. Outcome: New/Initial goal     Monitoring:   [x] Food and nutrient intake   [x] Food and nutrient administration  [x] Comparative standards   [x] Nutrition-focused physical findings   [x] Anthropometric Measurements   [x] Treatment/therapy   [x] Biochemical data, medical tests, and procedures        Previous Recommendations (for follow-up assessments only):  Not Applicable     Discharge Planning: Nutritional discharge needs unknown at this time. Participated in care planning, discharge planning, & interdisciplinary rounds as appropriate.       Faby Meadows RD  Pager: [phone]

## 2020-03-24 NOTE — ROUTINE PROCESS
Bedside and Verbal shift change report given to Tish De Los Santos RN (oncoming nurse) by Haris Galvan RN (offgoing nurse). Report included the following information SBAR, Kardex and MAR.

## 2020-03-24 NOTE — PROGRESS NOTES
Problem: AMI: Day 1  Goal: Medications  Outcome: Progressing Towards Goal  Note: Pt is receiving IV Heparin

## 2020-03-24 NOTE — PROGRESS NOTES
Interdisciplinary Round Note   Patient Information: Patient Information: Ryan Crystal                                      [birth year]   Reason for Admission: CHF (congestive heart failure) ([facility]) [I50.9]  NSTEMI (non-ST elevated myocardial infarction) ([facility] [I21.4]  [facility] [K56.609]   Attending Provider:   Ree Alexis MD   Past Medical History:   Past Medical History:   Diagnosis Date    Asthma     CHF (congestive heart failure) ([facility]     [facility]     Dependence on supplemental oxygen     Endocrine disease     thyroid issues    Gastrointestinal disorder     \"blockage in my stomach\"    Hypercholesterolemia     Hypertension       Hospital day: 1  Estimated discharge date: 3/25/20  RRAT Score: High Risk            27       Total Score        3 Has Seen PCP in Last 6 Months (Yes=3, No=0)    9 IP Visits Last 12 Months (1-3=4, 4=9, >4=11)    4 Pt. Coverage (Medicare=5 , Medicaid, or Self-Pay=4)    11 Charlson Comorbidity Score (Age + Comorbid Conditions)        Criteria that do not apply:    . Living with Significant Other. Assisted Living. LTAC. SNF. or   Rehab    Patient Length of Stay (>5 days = 3)       Goals for Today: Echo           VITAL SIGNS  Vitals:    03/24/20 0738 03/24/20 1058 03/24/20 1127 03/24/20 1129   BP: 155/88 155/88  (!) 216/100   Pulse: 87   98   Resp: 18   20   Temp: 98.1 °F (36.7 °C)   97.5 °F (36.4 °C)   SpO2: 99%  98% 98%   Weight:  71.2 kg (157 lb)     Height:  4' 11\" (1.499 m)     LMP: 04/14/2009          Lines, Drains, & Airways    Peripheral IV 03/23/20 Left;Posterior; Lower Forearm-Site Assessment: Clean, dry, & intact       VTE Prophylaxis                                   Intake and Output:   No intake/output data recorded.   03/24 0701 - 03/24 1900  In: -   Out: 1  Current Diet: DIET CLEAR LIQUID       Abdominal   Last Bowel Movement Date: 03/24/20  Stool Appearance: Hard  Abdominal Assessment: Guarding, Nausea, Soft, Vomiting, Tender  Appetite: NPO  Bowel Sounds: Hypoactive      Recent Glucose Results:   Lab Results   Component Value Date/Time     (H) 03/24/2020 01:35 AM          IV Antibiotics? Activity Level: Activity Level:  Up with Assistance  Needs assistance with ADLs: no  PT Consult Status: NO Current Immunizations:  Immunization History   Administered Date(s) Administered    Influenza Vaccine (Quad) PF 10/26/2015, 10/14/2019    Pneumococcal Polysaccharide (PPSV-23) 10/26/2015      Recommendations:   Discharge Disposition: Home with Home health    Medication Reconciliation Completed: YES    Cardiac/Pulmonary Rehab Ordered:  NO    Needs for Discharge: home health Recommendations from [address] [place] [address] team: none

## 2020-03-24 NOTE — ROUTINE PROCESS
Bedside shift change report given to [address] (oncoming nurse) by Jakob Romo RN (offgoing nurse). Report included the following information SBAR, Kardex, Intake/Output and MAR.

## 2020-03-24 NOTE — PROGRESS NOTES
Patients states she takes methadone at the [facility] [place] for  Methadone. Called to verify. Mrs. Boris Cabrera states that she was discharged since the 10th of March. Release of medical records form from patients. Called physician to update on patients condition.   Patient is in the hallway naked  stating,  'she is in pain.'

## 2020-03-24 NOTE — PROGRESS NOTES
0122. Mitchell Card paged hospitalist d/t pt requesting Methadone 80 mg \"that she drinks\", as she is in a Methadone program. She states that she was told by Dr. Elijah Oliveira, that she could have it at 1400h on 3/23 because she had it at 1400h on 3/22 and she must wait 24hrs. Per MAR, pt last dose was 3/23/20 at 0407h. Methadone is contraindicated with GI obstruction. 5251. Mitchell Card per Dr. Telma Bhat, refer to morning team. Morning team needs to contact clinic to get correct dose for pt; also, pt has SBO and Methadone could worsen it.      0156. Mitchell Card explained to pt that order for Methadone will be discussed with morning team. Also, advised that narcotics are contraindicated d/t SBO.    0158. Mitchell Card per Dr. Telma Bhat, pt can have IV Morphine 2 mg once.

## 2020-03-24 NOTE — PROGRESS NOTES
Reason for Admission:   CHF (congestive heart failure) ([facility]) [I50.9]  NSTEMI (non-ST elevated myocardial infarction) ([facility] [I21.4]  SBO (small bowel obstruction) ([facility] [K56.609]               RRAT Score:     53%             Resources/supports as identified by patient/family:       Top Challenges facing patient (as identified by patient/family and CM): Finances/Medication cost?     Has [facility] transport  Support system or lack thereof? Living arrangements? Lives with mother and ?daughter Yash Garrison whom she states is her caregiver   Self-care/ADLs/Cognition? Needs assist        Current Advanced Directive/Advance Care Plan:   yes                          Plan for utilizing home health:    yes                      Likelihood of readmission:   HIGH    Transition of Care Plan:                    Initial assessment completed with patient. Cognitive status of patient: oriented to time, place, person and situation. Face sheet information confirmed:  yes. The patient designates daughterCosmo [phone] to participate in her discharge plan and to receive any needed information. This patient lives in a single family home with mother and daughter. Patient is able to navigate steps as needed. Prior to hospitalization, patient was considered to be independent with ADLs/IADLS : no . If not independent,  patient needs assist with : dressing, bathing, food preparation, cooking, toileting and grooming    Patient has a current ACP document on file: yes  The daughter will be available to transport patient home upon discharge. The patient already has Epif[employer] Gearing, W/C, oxygen, and  medical equipment available in the home. Patient is not currently active with home health. Patient has not stayed in a skilled nursing facility or rehab.       This patient is on dialysis :no    List of available Home Health agencies were provided and reviewed with the patient prior to discharge. Freedom of choice signed: yes, for [facility]. Currently, the discharge plan is Home with [address]. The patient states that she can obtain her medications from the pharmacy, and take her medications as directed. Patient's current insurance is Medicaid. Pt states she is on home oxygen at 2L; serviced by [facility]. Care Management Interventions  PCP Verified by CM: Yes  Mode of Transport at Discharge: Self  Transition of Care Consult (CM Consult): Discharge Planning, [address]: Yes  Current Support Network:  Other(lives with mother and ?daughter)  Confirm Follow Up Transport: Family  The Plan for Transition of Care is Related to the Following Treatment Goals : home vs home with home health  The Patient and/or Patient Representative was Provided with a Choice of Provider and Agrees with the Discharge Plan?: Yes  Freedom of Choice List was Provided with Basic Dialogue that Supports the Patient's Individualized Plan of Care/Goals, Treatment Preferences and Shares the Quality Data Associated with the Providers?: Yes  Discharge Location  Discharge Placement: Home with home health        Chayo Lee RN - Outcomes Manager  [phone]

## 2020-03-24 NOTE — PROGRESS NOTES
[facility]  -  Progress Note      Patient: Shanti Bryant MRN: [id number]  SSN: [id number]    YOB: 1965  Age: 53 y.o. Sex: female      Admit Date: 3/23/2020    Assessment:     Hospital Problems  Date Reviewed: 10/23/2019          Codes Class Noted POA    CHF (congestive heart failure) ([facility]) ICD-10-CM: I50.9  ICD-9-CM: 428.0  3/23/2020 Unknown        SBO (small bowel obstruction) ([facility] [address] ICD-10-CM: K56.609  ICD-9-CM: 560.9  3/23/2020 Unknown        NSTEMI (non-ST elevated myocardial infarction) ([facility] ICD-10-CM: I21.4  ICD-9-CM: 410.70  3/22/2020 Unknown            --Non-ST elevation myocardial infarction. Peak troponin only to 0.68. Patient did have worsening ischemic EKG changes in the anterior leads now with a new atypical left bundle branch block. Of note this has been transient in the past.  Of more concern is her echocardiogram done this morning showing a new regional wall motion abnormality in the LAD distribution and a moderately depressed LV function, EF 35 to 40%. No chest pain this morning, but patient complaining of severe periumbilical pain likely due to SBO.  --Acute systolic heart failure. Symptoms appear to have improved following diuretics yesterday. Ejection fraction 35 to 40% on this morning's echocardiogram was a new finding for her. --Small bowel obstruction. Patient has a history of multiple abdominal surgeries. She currently complains of severe periumbilical pain which is her biggest complaint. She is refusing an NG tube. --Hypertension. Significant elevation likely due to severe pain. Plan:     Difficult situation at this point. At some point patient will require a cardiac catheterization to definitively evaluate her coronary anatomy. I would prefer that her abdominal pain improved since she is writhing around currently in her hospital bed. I will defer the management and work-up of this to her hospitalist and surgeon.   For now I would continue IV heparin. Will add topical nitrates to help with blood pressure control. No need for additional diuretics at this point. Agree with continuing clonidine patch. Will follow along. Subjective:     Complaining of severe abdominal pain. Denies chest pain or worsening shortness of breath.     Objective:      Patient Vitals for the past 8 hrs:   Temp Pulse Resp BP SpO2   03/24/20 1129 97.5 °F (36.4 °C) 98 20 (!) 216/100 98 %   03/24/20 1127     98 %   03/24/20 1058    155/88    03/24/20 0738 98.1 °F (36.7 °C) 87 18 155/88 99 %   03/24/20 0400 98.5 °F (36.9 °C) 93 20 (!) 158/93 97 %         Patient Vitals for the past 96 hrs:   Weight   03/24/20 1058 71.2 kg (157 lb)   03/23/20 1050 71.2 kg (157 lb)         Intake/Output Summary (Last 24 hours) at 3/24/2020 1150  Last data filed at 3/24/2020 0705  Gross per 24 hour   Intake 0 ml   Output 1 ml   Net -1 ml       Physical Exam:  General:  cooperative, combative, no distress, severe distress, appears older than stated age  Neck:  no JVD  Lungs:  wheezes scattered bilaterally, good air movement, no rhonchi or rales  Heart:  Tachycardic, no appreciable murmur  Abdomen: Severe tenderness to palpation throughout  Extremities:  extremities normal, atraumatic, no cyanosis or edema    Data Review:     Labs: Results:       Chemistry Recent Labs     03/24/20  0135 03/23/20  1122 03/22/20  2240   * 151* 116*    138 139   K 3.7 3.8 3.2*    103 106   CO2 29 28 28   BUN 23* 16 16   CREA 1.50* 1.15 1.15   CA 9.6 9.7 8.9   AGAP 6 7 5   BUCR 15 14 14   AP  --   --  97   TP  --   --  7.3   ALB  --   --  3.5   GLOB  --   --  3.8   AGRAT  --   --  0.9      CBC w/Diff Recent Labs     03/24/20  0135 03/23/20  1122 03/23/20  0541   WBC 7.5 8.7 6.7   RBC 5.27 5.25 4.76   HGB 14.6 14.6 13.2   HCT 44.0 43.4 40.1    263 229   GRANS 82* 81* 88*   LYMPH 16* 16* 11*   EOS 0 0 0      Cardiac Enzymes No results found for: CPK, CK, CKMMB, CKMB, RCK3, CKMBT, CKNDX, CKND1, JOSE, TROPT, TROIQ, SURY, TROPT, TNIPOC, BNP, BNPP   Coagulation Recent Labs     03/24/20  0953 03/24/20  0135   APTT 54.6* 51.0*       Lipid Panel No results found for: CHOL, CHOLPOCT, CHOLX, CHLST, CHOLV, 866733, HDL, HDLP, LDL, LDLC, DLDLP, 521526, VLDLC, VLDL, TGLX, TRIGL, TRIGP, TGLPOCT, CHHD, CHHDX   BNP Lab Results   Component Value Date/Time    BNP 19.9 12/07/2015 05:23 PM    BNP 3.4 11/01/2015 03:20 PM      Liver Enzymes Recent Labs     03/22/20  2240   TP 7.3   ALB 3.5   AP 97   SGOT 16      Digoxin    Thyroid Studies Lab Results   Component Value Date/Time    TSH 0.18 (L) 09/27/2019 10:50 PM

## 2020-03-25 LAB
ANION GAP SERPL CALC-SCNC: 7 MMOL/L (ref 3–18)
APTT PPP: 153.8 SEC (ref 23–36.4)
APTT PPP: 31.5 SEC (ref 23–36.4)
BASOPHILS # BLD: 0 K/UL (ref 0–0.1)
BASOPHILS NFR BLD: 0 % (ref 0–2)
BUN SERPL-MCNC: 24 MG/DL (ref 7–18)
BUN/CREAT SERPL: 21 (ref 12–20)
CALCIUM SERPL-MCNC: 8.6 MG/DL (ref 8.5–10.1)
CHLORIDE SERPL-SCNC: 102 MMOL/L (ref 100–111)
CO2 SERPL-SCNC: 28 MMOL/L (ref 21–32)
CREAT SERPL-MCNC: 1.13 MG/DL (ref 0.6–1.3)
DIFFERENTIAL METHOD BLD: NORMAL
EOSINOPHIL # BLD: 0 K/UL (ref 0–0.4)
EOSINOPHIL NFR BLD: 1 % (ref 0–5)
ERYTHROCYTE [DISTWIDTH] IN BLOOD BY AUTOMATED COUNT: 13.2 % (ref 11.6–14.5)
GLUCOSE SERPL-MCNC: 98 MG/DL (ref 74–99)
HCT VFR BLD AUTO: 39.9 % (ref 35–45)
HGB BLD-MCNC: 13.2 G/DL (ref 12–16)
LYMPHOCYTES # BLD: 2.4 K/UL (ref 0.9–3.6)
LYMPHOCYTES NFR BLD: 34 % (ref 21–52)
MCH RBC QN AUTO: 27.6 PG (ref 24–34)
MCHC RBC AUTO-ENTMCNC: 33.1 G/DL (ref 31–37)
MCV RBC AUTO: 83.5 FL (ref 74–97)
MONOCYTES # BLD: 0.6 K/UL (ref 0.05–1.2)
MONOCYTES NFR BLD: 9 % (ref 3–10)
NEUTS SEG # BLD: 4 K/UL (ref 1.8–8)
NEUTS SEG NFR BLD: 56 % (ref 40–73)
PLATELET # BLD AUTO: 219 K/UL (ref 135–420)
PMV BLD AUTO: 11.1 FL (ref 9.2–11.8)
POTASSIUM SERPL-SCNC: 3.3 MMOL/L (ref 3.5–5.5)
RBC # BLD AUTO: 4.78 M/UL (ref 4.2–5.3)
SODIUM SERPL-SCNC: 137 MMOL/L (ref 136–145)
WBC # BLD AUTO: 7.1 K/UL (ref 4.6–13.2)

## 2020-03-25 PROCEDURE — 74011250636 HC RX REV CODE- 250/636: Performed by: INTERNAL MEDICINE

## 2020-03-25 PROCEDURE — 74011250637 HC RX REV CODE- 250/637: Performed by: INTERNAL MEDICINE

## 2020-03-25 PROCEDURE — 85025 COMPLETE CBC W/AUTO DIFF WBC: CPT

## 2020-03-25 PROCEDURE — 74011000250 HC RX REV CODE- 250: Performed by: INTERNAL MEDICINE

## 2020-03-25 PROCEDURE — 85730 THROMBOPLASTIN TIME PARTIAL: CPT

## 2020-03-25 PROCEDURE — 80048 BASIC METABOLIC PNL TOTAL CA: CPT

## 2020-03-25 PROCEDURE — 36415 COLL VENOUS BLD VENIPUNCTURE: CPT

## 2020-03-25 PROCEDURE — 74011250637 HC RX REV CODE- 250/637: Performed by: COLON & RECTAL SURGERY

## 2020-03-25 PROCEDURE — 65270000029 HC RM PRIVATE

## 2020-03-25 RX ORDER — CLONIDINE HYDROCHLORIDE 0.1 MG/1
0.2 TABLET ORAL 2 TIMES DAILY
Status: DISCONTINUED | OUTPATIENT
Start: 2020-03-25 | End: 2020-03-25

## 2020-03-25 RX ORDER — POTASSIUM CHLORIDE 20 MEQ/1
40 TABLET, EXTENDED RELEASE ORAL
Status: COMPLETED | OUTPATIENT
Start: 2020-03-25 | End: 2020-03-25

## 2020-03-25 RX ORDER — HYDROMORPHONE HYDROCHLORIDE 1 MG/ML
0.25 INJECTION, SOLUTION INTRAMUSCULAR; INTRAVENOUS; SUBCUTANEOUS
Status: DISCONTINUED | OUTPATIENT
Start: 2020-03-25 | End: 2020-03-26

## 2020-03-25 RX ORDER — CLONIDINE HYDROCHLORIDE 0.1 MG/1
0.2 TABLET ORAL EVERY 12 HOURS
Status: DISCONTINUED | OUTPATIENT
Start: 2020-03-25 | End: 2020-03-26 | Stop reason: HOSPADM

## 2020-03-25 RX ORDER — IPRATROPIUM BROMIDE AND ALBUTEROL SULFATE 2.5; .5 MG/3ML; MG/3ML
3 SOLUTION RESPIRATORY (INHALATION)
Status: DISCONTINUED | OUTPATIENT
Start: 2020-03-25 | End: 2020-03-25

## 2020-03-25 RX ORDER — HEPARIN SODIUM 1000 [USP'U]/ML
3000 INJECTION, SOLUTION INTRAVENOUS; SUBCUTANEOUS ONCE
Status: COMPLETED | OUTPATIENT
Start: 2020-03-25 | End: 2020-03-25

## 2020-03-25 RX ORDER — IPRATROPIUM BROMIDE AND ALBUTEROL SULFATE 2.5; .5 MG/3ML; MG/3ML
3 SOLUTION RESPIRATORY (INHALATION)
Status: DISCONTINUED | OUTPATIENT
Start: 2020-03-25 | End: 2020-03-26 | Stop reason: HOSPADM

## 2020-03-25 RX ADMIN — HEPARIN SODIUM 1400 UNITS/HR: 10000 INJECTION, SOLUTION INTRAVENOUS at 18:56

## 2020-03-25 RX ADMIN — POTASSIUM BICARBONATE 40 MEQ: 782 TABLET, EFFERVESCENT ORAL at 08:39

## 2020-03-25 RX ADMIN — HYDROMORPHONE HYDROCHLORIDE 0.5 MG: 1 INJECTION, SOLUTION INTRAMUSCULAR; INTRAVENOUS; SUBCUTANEOUS at 09:37

## 2020-03-25 RX ADMIN — HYDROMORPHONE HYDROCHLORIDE 0.5 MG: 1 INJECTION, SOLUTION INTRAMUSCULAR; INTRAVENOUS; SUBCUTANEOUS at 00:28

## 2020-03-25 RX ADMIN — ROFLUMILAST 500 MCG: 500 TABLET ORAL at 08:38

## 2020-03-25 RX ADMIN — HYDROMORPHONE HYDROCHLORIDE 0.25 MG: 1 INJECTION, SOLUTION INTRAMUSCULAR; INTRAVENOUS; SUBCUTANEOUS at 21:48

## 2020-03-25 RX ADMIN — HEPARIN SODIUM 1200 UNITS/HR: 10000 INJECTION, SOLUTION INTRAVENOUS at 18:54

## 2020-03-25 RX ADMIN — KETOROLAC TROMETHAMINE 15 MG: 15 INJECTION, SOLUTION INTRAMUSCULAR; INTRAVENOUS at 06:03

## 2020-03-25 RX ADMIN — MONTELUKAST 10 MG: 10 TABLET, FILM COATED ORAL at 21:47

## 2020-03-25 RX ADMIN — ATORVASTATIN CALCIUM 20 MG: 20 TABLET, FILM COATED ORAL at 21:47

## 2020-03-25 RX ADMIN — IPRATROPIUM BROMIDE AND ALBUTEROL SULFATE 3 ML: .5; 3 SOLUTION RESPIRATORY (INHALATION) at 18:04

## 2020-03-25 RX ADMIN — HEPARIN SODIUM 3000 UNITS: 1000 INJECTION, SOLUTION INTRAVENOUS; SUBCUTANEOUS at 18:48

## 2020-03-25 RX ADMIN — ESCITALOPRAM OXALATE 10 MG: 10 TABLET ORAL at 17:18

## 2020-03-25 RX ADMIN — DOCUSATE SODIUM 200 MG: 100 CAPSULE, LIQUID FILLED ORAL at 08:38

## 2020-03-25 RX ADMIN — HYDROMORPHONE HYDROCHLORIDE 0.5 MG: 1 INJECTION, SOLUTION INTRAMUSCULAR; INTRAVENOUS; SUBCUTANEOUS at 06:04

## 2020-03-25 RX ADMIN — KETOROLAC TROMETHAMINE 15 MG: 15 INJECTION, SOLUTION INTRAMUSCULAR; INTRAVENOUS at 09:38

## 2020-03-25 RX ADMIN — DOCUSATE SODIUM 200 MG: 100 CAPSULE, LIQUID FILLED ORAL at 17:18

## 2020-03-25 RX ADMIN — POTASSIUM CHLORIDE 40 MEQ: 1500 TABLET, EXTENDED RELEASE ORAL at 14:40

## 2020-03-25 RX ADMIN — HYDROMORPHONE HYDROCHLORIDE 0.25 MG: 1 INJECTION, SOLUTION INTRAMUSCULAR; INTRAVENOUS; SUBCUTANEOUS at 15:27

## 2020-03-25 RX ADMIN — KETOROLAC TROMETHAMINE 15 MG: 15 INJECTION, SOLUTION INTRAMUSCULAR; INTRAVENOUS at 18:05

## 2020-03-25 RX ADMIN — HYDROMORPHONE HYDROCHLORIDE 0.5 MG: 1 INJECTION, SOLUTION INTRAMUSCULAR; INTRAVENOUS; SUBCUTANEOUS at 12:21

## 2020-03-25 RX ADMIN — HYDROMORPHONE HYDROCHLORIDE 0.25 MG: 1 INJECTION, SOLUTION INTRAMUSCULAR; INTRAVENOUS; SUBCUTANEOUS at 18:05

## 2020-03-25 NOTE — PROGRESS NOTES
Problem: Falls - Risk of  Goal: *Absence of Falls  Description: Document Ramon Godinez Fall Risk and appropriate interventions in the flowsheet.   Outcome: Progressing Towards Goal  Note: Fall Risk Interventions:            Medication Interventions: Patient to call before getting OOB    Elimination Interventions: Call light in reach, Patient to call for help with toileting needs              Problem: Patient Education: Go to Patient Education Activity  Goal: Patient/Family Education  Outcome: Progressing Towards Goal     Problem: AMI: Day 1  Goal: Off Pathway (Use only if patient is Off Pathway)  Outcome: Progressing Towards Goal  Goal: Activity/Safety  Outcome: Progressing Towards Goal  Goal: Consults, if ordered  Outcome: Progressing Towards Goal  Goal: Diagnostic Test/Procedures  Outcome: Progressing Towards Goal  Goal: Nutrition/Diet  Outcome: Progressing Towards Goal  Goal: Discharge Planning  Outcome: Progressing Towards Goal  Goal: Medications  Outcome: Progressing Towards Goal  Goal: Respiratory  Outcome: Progressing Towards Goal  Goal: Treatments/Interventions/Procedures  Outcome: Progressing Towards Goal  Goal: Psychosocial  Outcome: Progressing Towards Goal  Goal: *Eligible patient receives reperfusion therapy thrombolytics/PCI  Outcome: Progressing Towards Goal  Goal: *Optimal pain control at patient's stated goal  Outcome: Progressing Towards Goal  Goal: *Hemodynamically stable  Outcome: Progressing Towards Goal  Goal: *Received aspirin and beta-blocker within 24 hours of arrival unless contraindicated  Outcome: Progressing Towards Goal     Problem: Nutrition Deficit  Goal: *Optimize nutritional status  Outcome: Progressing Towards Goal

## 2020-03-25 NOTE — ROUTINE PROCESS
Patient refused heparin gtt to continue. Patient upset regarding diet order. Paged MD to speak with patient. Awaiting MD return call.

## 2020-03-25 NOTE — CDMP QUERY
Patient admitted with    PSBO Pt also has elevated troponin level : 
 
   Please clarify  in notes : 
 
 
attending - .Elevated troponin could be demand ischemia secondary to hypertension Cardiology-   --Non-ST elevation myocardial infarction. Peak troponin only to 0.68 Thank you,    Mali Lake RN   CCDS     [phone]

## 2020-03-25 NOTE — ROUTINE PROCESS
Bedside shift change report given to ROSALINE Samano (oncoming nurse) by Faviola Mcmahan RN (offgoing nurse). Report included the following information SBAR, Kardex, MAR and Recent Results.

## 2020-03-25 NOTE — PROGRESS NOTES
[facility]  -  Progress Note      Patient: Ermelinda Blizzard MRN: [id number]  SSN: [id number]    YOB: 1965  Age: 53 y.o. Sex: female      Admit Date: 3/23/2020    Assessment:     --Non-ST elevation myocardial infarction. Peak troponin only to 0.68. Patient did have worsening ischemic EKG changes in the anterior leads now with a new atypical left bundle branch block. Of note this has been transient in the past.  Of more concern is her echocardiogram done this morning showing a new regional wall motion abnormality in the LAD distribution and a moderately depressed LV function, EF 35 to 40%. No chest pain this morning, but patient complaining of severe periumbilical pain likely due to SBO.  --Acute systolic heart failure. Symptoms appear to have improved following diuretics yesterday. Ejection fraction 35 to 40% on this morning's echocardiogram was a new finding for her. --Small bowel obstruction. Patient has a history of multiple abdominal surgeries. She currently complains of severe periumbilical pain which is her biggest complaint. She is refusing an NG tube. --Hypertension. Significant elevation likely due to severe pain. Plan:     -Appreciate assistance of surgical team.  -Pt remains on Heparin infusion.  -Continue PO Lopressor. Will not switch to Coreg due to Hx asthma.  -Will discontinue Amlodipine due to cardiomyopathy. - this AM (and HTN meds subsequently held), would add ACEi/ARB if allowed by BP and renal function stable. BP much improved today.    -Consider cardiac catheterization if no intervention planned from surgical standpoint. Subjective:     No new complaints. States abdominal pain is much better since having approx. 4 bowel movements this morning (after being given enemas). Reports episodes of chest pressure lasting maybe 15 minutes, none today.     Objective:      Patient Vitals for the past 8 hrs:   Temp Pulse Resp BP SpO2   03/25/20 0750 98.6 °F (37 °C) 76 18 107/44 96 %   03/25/20 0545 97.2 °F (36.2 °C) 69 18 137/84 94 %         Patient Vitals for the past 96 hrs:   Weight   03/25/20 0545 166 lb 11.2 oz (75.6 kg)   03/24/20 1058 157 lb (71.2 kg)   03/23/20 1050 157 lb (71.2 kg)         Intake/Output Summary (Last 24 hours) at 3/25/2020 1300  Last data filed at 3/25/2020 0856  Gross per 24 hour   Intake 600 ml   Output    Net 600 ml       Physical Exam:  General:  alert, cooperative, no distress, appears stated age  Neck:  supple  Lungs:  Clear to auscultation  Heart:  Regular rate and rhythm  Abdomen:  abdomen is soft with mild tenderness (which pt states is improved from prior)  Extremities:  Atraumatic, no edema     Data Review:     Labs: Results:       Chemistry Recent Labs     03/25/20  0436 03/24/20  0135 03/23/20  1122 03/22/20  2240   GLU 98 131* 151* 116*    138 138 139   K 3.3* 3.7 3.8 3.2*    103 103 106   CO2 28 29 28 28   BUN 24* 23* 16 16   CREA 1.13 1.50* 1.15 1.15   CA 8.6 9.6 9.7 8.9   AGAP 7 6 7 5   BUCR 21* 15 14 14   AP  --   --   --  97   TP  --   --   --  7.3   ALB  --   --   --  3.5   GLOB  --   --   --  3.8   AGRAT  --   --   --  0.9      CBC w/Diff Recent Labs     03/25/20 0436 03/24/20  0135 03/23/20  1122   WBC 7.1 7.5 8.7   RBC 4.78 5.27 5.25   HGB 13.2 14.6 14.6   HCT 39.9 44.0 43.4    280 263   GRANS 56 82* 81*   LYMPH 34 16* 16*   EOS 1 0 0      Coagulation Recent Labs     03/25/20  0436 03/24/20  1821   APTT 153.8* 110.2*       BNP Lab Results   Component Value Date/Time    BNP 19.9 12/07/2015 05:23 PM    BNP 3.4 11/01/2015 03:20 PM      Liver Enzymes Recent Labs     03/22/20  2240   TP 7.3   ALB 3.5   AP 97   SGOT 16      Thyroid Studies Lab Results   Component Value Date/Time    TSH 0.18 (L) 09/27/2019 10:50 PM

## 2020-03-25 NOTE — PROGRESS NOTES
Pt insisting on another enema so fleetz given with sm soft stool results. She is c/o of abd pain. She started coughing, large amount of thick yellowish sputum expectorated and then said she needed a breathing treatment. Neb tx and pain meds given. She is now resting more comfortably.

## 2020-03-25 NOTE — ROUTINE PROCESS
Bedside shift change report given to [address]  (oncoming nurse) by Silvio Kuhn RN  (offgoing nurse). Report included the following information SBAR, Kardex and MAR.

## 2020-03-25 NOTE — PROGRESS NOTES
SUBJECTIVE:    Patient is feeling okay. Still complains of having intermittent abdominal pain, shortness of breath and nausea. She also told me she had multiple bowel movements this morning. She has been walking in hallway without any problem. She is constantly asking about pain medication. OBJECTIVE:    /44 (BP 1 Location: Left arm, BP Patient Position: At rest)   Pulse 76   Temp 98.6 °F (37 °C)   Resp 18   Ht 4' 11\" (1.499 m)   Wt 75.6 kg (166 lb 11.2 oz)   LMP 04/14/2009   SpO2 96%   Breastfeeding No   BMI 33.67 kg/m²     General appearance - alert, well appearing, and in no distress  Nurse for 61 chest - decreased air entry noted in bases, no wheezes  Heart - S1 and S2 normal  Abdomen - soft, surgical scars present and some tenderness present in periumbilical area, nondistended, Bowel sounds present  Neurological - alert, oriented, normal speech, no focal findings noted while in bed  Musculoskeletal - no joint tenderness or swelling of knees bilaterally  Extremities - no pedal edema noted    ASSESSMENT:    1. Chronic abdominal pain  2. Partial small bowel obstruction based on CT scan  3. Type 2 MI due to demand ischemia secondary to hypertension  4. Uncontrolled hypertension  5. Transient left bundle branch block  6. Dyspnea on exertion due to underlying COPD  7. COPD  8. Dyslipidemia  9. History of prolonged heroin use  10. Tobacco use disorder  11. Hypokalemia    PLAN:    Continue current management  We will change clonidine patch to p.o. clonidine  We will replace potassium  Echocardiogram report reviewed  Cardiology input as well as general surgery input noted  We will continue n.p.o. except meds    Patient has been advised to comply with current treatment plan. If she does not comply, she can go home [address]       Disclaimer: Sections of this note are dictated using utilizing voice recognition software, which may have resulted in some phonetic based errors in grammar and contents. Even though attempts were made to correct all the mistakes, some may have been missed, and remained in the body of the document. If questions arise, please contact our department.     BMP:   Lab Results   Component Value Date/Time     03/25/2020 04:36 AM    K 3.3 (L) 03/25/2020 04:36 AM     03/25/2020 04:36 AM    CO2 28 03/25/2020 04:36 AM    AGAP 7 03/25/2020 04:36 AM    GLU 98 03/25/2020 04:36 AM    BUN 24 (H) 03/25/2020 04:36 AM    CREA 1.13 03/25/2020 04:36 AM    GFRAA >60 03/25/2020 04:36 AM    GFRNA 50 (L) 03/25/2020 04:36 AM     CBC:   Lab Results   Component Value Date/Time    WBC 7.1 03/25/2020 04:36 AM    HGB 13.2 03/25/2020 04:36 AM    HCT 39.9 03/25/2020 04:36 AM     03/25/2020 04:36 AM     All Cardiac Markers in the last 24 hours: No results found for: CPK, CK, CKMMB, CKMB, RCK3, CKMBT, CKNDX, CKND1, JOSE, TROPT, TROIQ, SURY, TROPT, TNIPOC, BNP, BNPP

## 2020-03-25 NOTE — CDMP QUERY
Patient admitted with chest pain- PT left AMA. If possible, please clarify if you were evaluating and/or treating any of the following: 
 
NSTEMI Type 2 MI due to demand Demand Ischemia Only Unstable Angina Other, please specify Undetermined The medical record reflects the following: 
Risk Factors: HTN, chest pain, abd pain Clinical Indicators:  
Chest pain Troponin trended 0.68, 0.42, 0.20 ECG: 3/23 ecg: When compared with ECG of 22-MAR-2020 22:39,  
QRS duration has increased Serial changes of evolving Anterior infarct present Serial changes of evolving Anterolateral infarct present 3/24 Echo:  As compared to the previous study, there are significant changes. LV function has decreased. New regional wall motion abnormality present. Treatment: heparin gtt, BB, card consult, Thank you. Mikaela Hemphill RN BSN CCDS [phone]

## 2020-03-25 NOTE — PROGRESS NOTES
Patient refuse to have this nurse or tasking nurse insert an additional IV for the D5 IV fluids to infuse. patient is  uncooperative and verbally aggressive. On call made aware. No new orders at this time. Md made aware that patient is consuming icy pops as per order.

## 2020-03-25 NOTE — PROGRESS NOTES
Patient refused HHN she states he was given a treatment earlier and did not feel she needs one. RT ask her to call if she feel she need a Treatment will change to prn.  No distress noted

## 2020-03-25 NOTE — PROGRESS NOTES
Problem: Falls - Risk of  Goal: *Absence of Falls  Description: Document Melani Rojas Fall Risk and appropriate interventions in the flowsheet.   Outcome: Progressing Towards Goal  Note: Fall Risk Interventions:            Medication Interventions: Patient to call before getting OOB    Elimination Interventions: Call light in reach              Problem: Patient Education: Go to Patient Education Activity  Goal: Patient/Family Education  Outcome: Progressing Towards Goal     Problem: AMI: Day 1  Goal: Off Pathway (Use only if patient is Off Pathway)  Outcome: Progressing Towards Goal  Goal: Activity/Safety  Outcome: Progressing Towards Goal  Goal: Consults, if ordered  Outcome: Progressing Towards Goal  Goal: Diagnostic Test/Procedures  Outcome: Progressing Towards Goal  Goal: Nutrition/Diet  Outcome: Progressing Towards Goal  Goal: Discharge Planning  Outcome: Progressing Towards Goal  Goal: Medications  Outcome: Progressing Towards Goal  Goal: Respiratory  Outcome: Progressing Towards Goal  Goal: Treatments/Interventions/Procedures  Outcome: Progressing Towards Goal  Goal: Psychosocial  Outcome: Progressing Towards Goal  Goal: *Eligible patient receives reperfusion therapy thrombolytics/PCI  Outcome: Progressing Towards Goal  Goal: *Optimal pain control at patient's stated goal  Outcome: Progressing Towards Goal  Goal: *Hemodynamically stable  Outcome: Progressing Towards Goal  Goal: *Received aspirin and beta-blocker within 24 hours of arrival unless contraindicated  Outcome: Progressing Towards Goal

## 2020-03-25 NOTE — ROUTINE PROCESS
Bedside shift change report given to [address] (oncoming nurse) by Shamir Silverio (offgoing nurse). Report included the following information SBAR, Kardex, Intake/Output, MAR and Recent Results.

## 2020-03-25 NOTE — ROUTINE PROCESS
TRANSFER - IN REPORT: 
 
Verbal report received from [facility] -  [facility] RN(name) on Kya Silva  being received from Jazmyne RN(unit) for shift change. Report consisted of patients Situation, Background, Assessment and  
Recommendations(SBAR). Information from the following report(s) SBAR, Kardex, Intake/Output, MAR, Recent Results and Med Rec Status was reviewed with the receiving nurse. Opportunity for questions and clarification was provided. Assessment completed upon patients arrival to unit and care assumed.

## 2020-03-25 NOTE — PROGRESS NOTES
NUTRITION    Nursing Referral: [facility]     RECOMMENDATIONS / PLAN:     - Add supplement: Ensure Enlive once daily  - Continue RD inpatient monitoring and evaluation. NUTRITION INTERVENTIONS & DIAGNOSIS:     - Meals/snacks: modified composition  - Medical food supplement therapy: initiate     Nutrition Diagnosis: Inadequate oral intake related to altered GI function as evidenced by pt NPO with PSBO per CT imaging - resolved. Unintended weight loss related to predicted prolonged inadequate energy intake as evidenced by wt loss of 25.9% x year PTA    ASSESSMENT:     3/25: Pt started on clear liquid diet yesterday; tolerated and was advanced to solid foods today. + BM today. Per CRS, plan for discharge tomorrow if pt tolerates solid foods    3/24: Pt admitted yesterday for SBO, hypertensive emergency and SOB but left AMA. Pt readmitted & off floor during visit. Per chart hard BM today, hx of methadone use and wt loss x year. CT yesterday showing PSBO, likely not a high-grade obstruction, currently NPO without NGT & question pt compliance with placement.     Nutritional intake adequate to meet patients estimated nutritional needs:  No    Diet: DIET REGULAR Low Fiber  DIET NPO Except Meds, With Sips of Clear Fluids      Food Allergies: NKFA  Current Appetite: Not Applicable - NPO  Appetite/meal intake prior to admission: Unknown  Feeding Limitations:  [] Swallowing difficulty    [] Chewing difficulty    [] Other:  Current Meal Intake:   Patient Vitals for the past 100 hrs:   % Diet Eaten   03/25/20 0856 100 %   03/24/20 1726 50 %   03/23/20 1826 0 %       BM: 3/25 - formed/soft  Skin Integrity: WDL  Edema:   [x] No     [] Yes   Pertinent Medications: Reviewed: atorvastatin, D5 at 50 mL/hr (60 gm dextrose, 204 kcal per day), bowel regimen, ondansetron    Recent Labs     03/25/20  0436 03/24/20  0135 03/23/20  1122 03/22/20  2240    138 138 139   K 3.3* 3.7 3.8 3.2*    103 103 106   CO2 28 29 28 28   GLU 98 131* 151* 116*   BUN 24* 23* 16 16   CREA 1.13 1.50* 1.15 1.15   CA 8.6 9.6 9.7 8.9   ALB  --   --   --  3.5   SGOT  --   --   --  16   ALT  --   --   --  20       Intake/Output Summary (Last 24 hours) at 3/25/2020 1509  Last data filed at 3/25/2020 0856  Gross per 24 hour   Intake 600 ml   Output    Net 600 ml       Anthropometrics:  Ht Readings from Last 1 Encounters:   03/24/20 4' 11\" (1.499 m)     Last 3 Recorded Weights in this Encounter    03/23/20 1050 03/24/20 1058 03/25/20 0545   Weight: 71.2 kg (157 lb) 71.2 kg (157 lb) 75.6 kg (166 lb 11.2 oz)     Body mass index is 33.67 kg/m². Obese Class I    Weight History: Pt unavailable during initial assessment. Per chart review -55#, 25.9% x year PTA    Weight Metrics 3/25/2020 3/23/2020 3/22/2020 1/17/2020 12/20/2019 11/25/2019 11/21/2019   Weight 166 lb 11.2 oz - 157 lb 165 lb 170 lb 178 lb 179 lb   BMI - 33.67 kg/m2 31.71 kg/m2 33.33 kg/m2 34.34 kg/m2 35.95 kg/m2 36.15 kg/m2        Admitting Diagnosis: CHF (congestive heart failure) ([facility]) [I50.9]  NSTEMI (non-ST elevated myocardial infarction) ([facility]) [I21.4]  SBO (small bowel obstruction) ([facility] [K56.609]  Pertinent PMHx: CHF, COPD, GI disorder, HTN, hypercholesterolemia    Education Needs:        [x] None identified  [] Identified - Not appropriate at this time  []  Identified and addressed - refer to education log  Learning Limitations:   [x] None identified  [] Identified    Cultural, Jehovah's witness & ethnic food preferences:  [x] None identified    [] Identified and addressed     ESTIMATED NUTRITION NEEDS:     Calories: 0155-0068 kcal (MSJx1.2-1.4) based on  [x] Actual BW: 71 kg      [] IBW   Protein: 57-71 gm (0.8-1 gm/kg) based on  [x] Actual BW      [] IBW   Fluid: 1 mL/kcal     MONITORING & EVALUATION:     Nutrition Goal(s):   - Nutritional needs will be met through adequate oral intake or nutrition support within the next 5-7 days.   Outcome: Progressing towards goal     Monitoring:   [x] Food and nutrient intake   [x] Food and nutrient administration  [x] Comparative standards   [x] Nutrition-focused physical findings   [x] Anthropometric Measurements   [x] Treatment/therapy   [x] Biochemical data, medical tests, and procedures        Previous Recommendations (for follow-up assessments only): Implemented      Discharge Planning: Nutritional discharge needs unknown at this time. Participated in care planning, discharge planning, & interdisciplinary rounds as appropriate.       Maren Lunsford [id number]

## 2020-03-25 NOTE — PROGRESS NOTES
[facility] [place] [address]  [phone]  Colon and Rectal Surgery Progress Note      Patient: Ric Mandel MRN: [id number]  SSN: [id number]    YOB: 1965  Age: 53 y.o. Sex: female      Admit Date: 3/23/2020    LOS: 2 days     Subjective:     Had several bm. Pain resolved.     Objective:     Vitals:    03/24/20 2048 03/25/20 0157 03/25/20 0545 03/25/20 0750   BP: 154/85 131/79 137/84 107/44   Pulse: 71 66 69 76   Resp: 18 18 18 18   Temp: 98.9 °F (37.2 °C) 98.2 °F (36.8 °C) 97.2 °F (36.2 °C) 98.6 °F (37 °C)   SpO2: 96% 96% 94% 96%   Weight:   75.6 kg (166 lb 11.2 oz)    Height:            Intake and Output:  Current Shift: 03/25 0701 - 03/25 1900  In: 240 [P.O.:240]  Out: -   Last three shifts: 03/23 1901 - 03/25 0700  In: 360 [P.O.:360]  Out: 1     Physical Exam:     abd soft, NTND    Lab/Data Review:    CMP:   Lab Results   Component Value Date/Time     03/25/2020 04:36 AM    K 3.3 (L) 03/25/2020 04:36 AM     03/25/2020 04:36 AM    CO2 28 03/25/2020 04:36 AM    AGAP 7 03/25/2020 04:36 AM    GLU 98 03/25/2020 04:36 AM    BUN 24 (H) 03/25/2020 04:36 AM    CREA 1.13 03/25/2020 04:36 AM    GFRAA >60 03/25/2020 04:36 AM    GFRNA 50 (L) 03/25/2020 04:36 AM    CA 8.6 03/25/2020 04:36 AM     CBC:   Lab Results   Component Value Date/Time    WBC 7.1 03/25/2020 04:36 AM    HGB 13.2 03/25/2020 04:36 AM    HCT 39.9 03/25/2020 04:36 AM     03/25/2020 04:36 AM        Assessment:     PSBO, improved    Plan:     Advance to low fiber diet for dinner  Home tomorrow if tolerates    Signed By: Basim Hyman MD        March 25, 2020

## 2020-03-26 ENCOUNTER — APPOINTMENT (OUTPATIENT)
Dept: GENERAL RADIOLOGY | Age: 55
DRG: 190 | End: 2020-03-26
Attending: INTERNAL MEDICINE
Payer: COMMERCIAL

## 2020-03-26 VITALS
TEMPERATURE: 98 F | DIASTOLIC BLOOD PRESSURE: 77 MMHG | HEIGHT: 59 IN | RESPIRATION RATE: 16 BRPM | SYSTOLIC BLOOD PRESSURE: 119 MMHG | BODY MASS INDEX: 33.6 KG/M2 | HEART RATE: 68 BPM | WEIGHT: 166.7 LBS | OXYGEN SATURATION: 98 %

## 2020-03-26 LAB
ACT BLD: 120 SECS (ref 79–138)
ANION GAP SERPL CALC-SCNC: 6 MMOL/L (ref 3–18)
APTT PPP: >180 SEC (ref 23–36.4)
BASOPHILS # BLD: 0 K/UL (ref 0–0.1)
BASOPHILS NFR BLD: 1 % (ref 0–2)
BUN SERPL-MCNC: 22 MG/DL (ref 7–18)
BUN/CREAT SERPL: 17 (ref 12–20)
CALCIUM SERPL-MCNC: 8.5 MG/DL (ref 8.5–10.1)
CHLORIDE SERPL-SCNC: 105 MMOL/L (ref 100–111)
CO2 SERPL-SCNC: 29 MMOL/L (ref 21–32)
CREAT SERPL-MCNC: 1.3 MG/DL (ref 0.6–1.3)
DIFFERENTIAL METHOD BLD: NORMAL
EOSINOPHIL # BLD: 0.2 K/UL (ref 0–0.4)
EOSINOPHIL NFR BLD: 3 % (ref 0–5)
ERYTHROCYTE [DISTWIDTH] IN BLOOD BY AUTOMATED COUNT: 13 % (ref 11.6–14.5)
GLUCOSE SERPL-MCNC: 103 MG/DL (ref 74–99)
HCT VFR BLD AUTO: 38.4 % (ref 35–45)
HGB BLD-MCNC: 12.5 G/DL (ref 12–16)
LYMPHOCYTES # BLD: 3.1 K/UL (ref 0.9–3.6)
LYMPHOCYTES NFR BLD: 48 % (ref 21–52)
MCH RBC QN AUTO: 27.8 PG (ref 24–34)
MCHC RBC AUTO-ENTMCNC: 32.6 G/DL (ref 31–37)
MCV RBC AUTO: 85.3 FL (ref 74–97)
MONOCYTES # BLD: 0.5 K/UL (ref 0.05–1.2)
MONOCYTES NFR BLD: 8 % (ref 3–10)
NEUTS SEG # BLD: 2.6 K/UL (ref 1.8–8)
NEUTS SEG NFR BLD: 40 % (ref 40–73)
PLATELET # BLD AUTO: 208 K/UL (ref 135–420)
PMV BLD AUTO: 10.9 FL (ref 9.2–11.8)
POTASSIUM SERPL-SCNC: 3.7 MMOL/L (ref 3.5–5.5)
RBC # BLD AUTO: 4.5 M/UL (ref 4.2–5.3)
SODIUM SERPL-SCNC: 140 MMOL/L (ref 136–145)
WBC # BLD AUTO: 6.4 K/UL (ref 4.6–13.2)

## 2020-03-26 PROCEDURE — 74011250636 HC RX REV CODE- 250/636

## 2020-03-26 PROCEDURE — 74011250637 HC RX REV CODE- 250/637: Performed by: COLON & RECTAL SURGERY

## 2020-03-26 PROCEDURE — 99152 MOD SED SAME PHYS/QHP 5/>YRS: CPT | Performed by: INTERNAL MEDICINE

## 2020-03-26 PROCEDURE — 74011000250 HC RX REV CODE- 250: Performed by: INTERNAL MEDICINE

## 2020-03-26 PROCEDURE — 77030015766: Performed by: INTERNAL MEDICINE

## 2020-03-26 PROCEDURE — 77030013744: Performed by: INTERNAL MEDICINE

## 2020-03-26 PROCEDURE — 85730 THROMBOPLASTIN TIME PARTIAL: CPT

## 2020-03-26 PROCEDURE — B2151ZZ FLUOROSCOPY OF LEFT HEART USING LOW OSMOLAR CONTRAST: ICD-10-PCS | Performed by: INTERNAL MEDICINE

## 2020-03-26 PROCEDURE — 74011636320 HC RX REV CODE- 636/320: Performed by: INTERNAL MEDICINE

## 2020-03-26 PROCEDURE — 80048 BASIC METABOLIC PNL TOTAL CA: CPT

## 2020-03-26 PROCEDURE — 4A023N7 MEASUREMENT OF CARDIAC SAMPLING AND PRESSURE, LEFT HEART, PERCUTANEOUS APPROACH: ICD-10-PCS | Performed by: INTERNAL MEDICINE

## 2020-03-26 PROCEDURE — 85347 COAGULATION TIME ACTIVATED: CPT

## 2020-03-26 PROCEDURE — 99153 MOD SED SAME PHYS/QHP EA: CPT | Performed by: INTERNAL MEDICINE

## 2020-03-26 PROCEDURE — 94761 N-INVAS EAR/PLS OXIMETRY MLT: CPT

## 2020-03-26 PROCEDURE — 74011250636 HC RX REV CODE- 250/636: Performed by: INTERNAL MEDICINE

## 2020-03-26 PROCEDURE — 77030013797 HC KT TRNSDUC PRSSR EDWD -A: Performed by: INTERNAL MEDICINE

## 2020-03-26 PROCEDURE — 74011250637 HC RX REV CODE- 250/637: Performed by: INTERNAL MEDICINE

## 2020-03-26 PROCEDURE — B2111ZZ FLUOROSCOPY OF MULTIPLE CORONARY ARTERIES USING LOW OSMOLAR CONTRAST: ICD-10-PCS | Performed by: INTERNAL MEDICINE

## 2020-03-26 PROCEDURE — 93458 L HRT ARTERY/VENTRICLE ANGIO: CPT | Performed by: INTERNAL MEDICINE

## 2020-03-26 PROCEDURE — 77030027845 HC BND COM RDL D-STAT TELE -B: Performed by: INTERNAL MEDICINE

## 2020-03-26 PROCEDURE — 94640 AIRWAY INHALATION TREATMENT: CPT

## 2020-03-26 PROCEDURE — 85025 COMPLETE CBC W/AUTO DIFF WBC: CPT

## 2020-03-26 PROCEDURE — 74018 RADEX ABDOMEN 1 VIEW: CPT

## 2020-03-26 PROCEDURE — C1894 INTRO/SHEATH, NON-LASER: HCPCS | Performed by: INTERNAL MEDICINE

## 2020-03-26 PROCEDURE — 36415 COLL VENOUS BLD VENIPUNCTURE: CPT

## 2020-03-26 RX ORDER — VERAPAMIL HYDROCHLORIDE 2.5 MG/ML
INJECTION, SOLUTION INTRAVENOUS AS NEEDED
Status: DISCONTINUED | OUTPATIENT
Start: 2020-03-26 | End: 2020-03-26 | Stop reason: HOSPADM

## 2020-03-26 RX ORDER — OXYCODONE AND ACETAMINOPHEN 5; 325 MG/1; MG/1
1 TABLET ORAL
Qty: 12 TAB | Refills: 0 | Status: SHIPPED | OUTPATIENT
Start: 2020-03-26 | End: 2020-03-29

## 2020-03-26 RX ORDER — ASPIRIN 81 MG/1
81 TABLET ORAL DAILY
Status: DISCONTINUED | OUTPATIENT
Start: 2020-03-27 | End: 2020-03-26 | Stop reason: HOSPADM

## 2020-03-26 RX ORDER — FAMOTIDINE 20 MG/1
20 TABLET, FILM COATED ORAL DAILY
Qty: 30 TAB | Refills: 1 | Status: SHIPPED | OUTPATIENT
Start: 2020-03-26 | End: 2021-02-17

## 2020-03-26 RX ORDER — ASPIRIN 81 MG/1
81 TABLET ORAL DAILY
Qty: 30 TAB | Refills: 0 | Status: SHIPPED | OUTPATIENT
Start: 2020-03-27 | End: 2020-12-30 | Stop reason: SDUPTHER

## 2020-03-26 RX ORDER — LIDOCAINE HYDROCHLORIDE 10 MG/ML
INJECTION, SOLUTION EPIDURAL; INFILTRATION; INTRACAUDAL; PERINEURAL AS NEEDED
Status: DISCONTINUED | OUTPATIENT
Start: 2020-03-26 | End: 2020-03-26 | Stop reason: HOSPADM

## 2020-03-26 RX ORDER — FENTANYL CITRATE 50 UG/ML
INJECTION, SOLUTION INTRAMUSCULAR; INTRAVENOUS AS NEEDED
Status: DISCONTINUED | OUTPATIENT
Start: 2020-03-26 | End: 2020-03-26 | Stop reason: HOSPADM

## 2020-03-26 RX ORDER — HYDROMORPHONE HYDROCHLORIDE 2 MG/ML
INJECTION, SOLUTION INTRAMUSCULAR; INTRAVENOUS; SUBCUTANEOUS
Status: COMPLETED
Start: 2020-03-26 | End: 2020-03-26

## 2020-03-26 RX ORDER — GUAIFENESIN 100 MG/5ML
81 LIQUID (ML) ORAL ONCE
Status: COMPLETED | OUTPATIENT
Start: 2020-03-26 | End: 2020-03-26

## 2020-03-26 RX ORDER — MIDAZOLAM HYDROCHLORIDE 1 MG/ML
INJECTION, SOLUTION INTRAMUSCULAR; INTRAVENOUS AS NEEDED
Status: DISCONTINUED | OUTPATIENT
Start: 2020-03-26 | End: 2020-03-26 | Stop reason: HOSPADM

## 2020-03-26 RX ORDER — HYDROMORPHONE HYDROCHLORIDE 2 MG/ML
0.25 INJECTION, SOLUTION INTRAMUSCULAR; INTRAVENOUS; SUBCUTANEOUS ONCE
Status: COMPLETED | OUTPATIENT
Start: 2020-03-26 | End: 2020-03-26

## 2020-03-26 RX ORDER — ONDANSETRON 4 MG/1
4 TABLET, ORALLY DISINTEGRATING ORAL
Qty: 20 TAB | Refills: 0 | Status: SHIPPED | OUTPATIENT
Start: 2020-03-26 | End: 2020-09-07

## 2020-03-26 RX ORDER — SODIUM CHLORIDE 0.9 % (FLUSH) 0.9 %
5-40 SYRINGE (ML) INJECTION AS NEEDED
Status: DISCONTINUED | OUTPATIENT
Start: 2020-03-26 | End: 2020-03-26 | Stop reason: HOSPADM

## 2020-03-26 RX ORDER — OXYCODONE AND ACETAMINOPHEN 5; 325 MG/1; MG/1
1 TABLET ORAL
Status: DISCONTINUED | OUTPATIENT
Start: 2020-03-26 | End: 2020-03-26 | Stop reason: HOSPADM

## 2020-03-26 RX ORDER — SODIUM CHLORIDE 0.9 % (FLUSH) 0.9 %
5-40 SYRINGE (ML) INJECTION EVERY 8 HOURS
Status: DISCONTINUED | OUTPATIENT
Start: 2020-03-26 | End: 2020-03-26 | Stop reason: HOSPADM

## 2020-03-26 RX ADMIN — HYDROMORPHONE HYDROCHLORIDE 0.25 MG: 2 INJECTION, SOLUTION INTRAMUSCULAR; INTRAVENOUS; SUBCUTANEOUS at 12:03

## 2020-03-26 RX ADMIN — HYDROMORPHONE HYDROCHLORIDE 0.25 MG: 1 INJECTION, SOLUTION INTRAMUSCULAR; INTRAVENOUS; SUBCUTANEOUS at 07:47

## 2020-03-26 RX ADMIN — ROFLUMILAST 500 MCG: 500 TABLET ORAL at 13:42

## 2020-03-26 RX ADMIN — HYDROMORPHONE HYDROCHLORIDE 0.25 MG: 1 INJECTION, SOLUTION INTRAMUSCULAR; INTRAVENOUS; SUBCUTANEOUS at 02:18

## 2020-03-26 RX ADMIN — CLONIDINE HYDROCHLORIDE 0.2 MG: 0.1 TABLET ORAL at 13:42

## 2020-03-26 RX ADMIN — ASPIRIN 81 MG 81 MG: 81 TABLET ORAL at 09:45

## 2020-03-26 RX ADMIN — ARFORMOTEROL TARTRATE 15 MCG: 15 SOLUTION RESPIRATORY (INHALATION) at 08:00

## 2020-03-26 RX ADMIN — KETOROLAC TROMETHAMINE 15 MG: 15 INJECTION, SOLUTION INTRAMUSCULAR; INTRAVENOUS at 13:41

## 2020-03-26 RX ADMIN — BUDESONIDE 500 MCG: 0.5 INHALANT RESPIRATORY (INHALATION) at 08:54

## 2020-03-26 RX ADMIN — HYDROMORPHONE HYDROCHLORIDE 0.25 MG: 1 INJECTION, SOLUTION INTRAMUSCULAR; INTRAVENOUS; SUBCUTANEOUS at 13:50

## 2020-03-26 RX ADMIN — METOPROLOL TARTRATE 12.5 MG: 25 TABLET, FILM COATED ORAL at 13:42

## 2020-03-26 RX ADMIN — DOCUSATE SODIUM 200 MG: 100 CAPSULE, LIQUID FILLED ORAL at 13:42

## 2020-03-26 RX ADMIN — KETOROLAC TROMETHAMINE 15 MG: 15 INJECTION, SOLUTION INTRAMUSCULAR; INTRAVENOUS at 02:18

## 2020-03-26 NOTE — PROGRESS NOTES
Patient has been NPO tonight for possible cardiac cath in the am. Discussed the procedure with patient this morning prior to giving CHG bath. Patient refused CHG bath and states she wasn't sure about having the procedure this morning. Patient also had several episodes on incontinence throughout this shift.

## 2020-03-26 NOTE — DISCHARGE SUMMARY
I got a call from nursing to inform me that patient left AGAINST MEDICAL ADVICE after the x-ray was done. My initial plan was to discharge this patient home after x-rays is done and it remains unremarkable. X-ray abdomen was unremarkable. Patient was constantly asking for IV narcotics. She also told me that it will be hard for her to get methadone. She also requested me to write her Percocet prescription. Her cardiac cath was unremarkable. Patient did not have any abdominal tenderness when I examined her while she was talking to me. Patient is medically stable to be discharged but she has left [address] before we discharge her officially without any prescriptions. Patient is very well-known to our hospital and she will come back to the hospital if she really needs medical help.

## 2020-03-26 NOTE — PROGRESS NOTES
Attempted to start 2nd IV for cardiac cath procedure, pt could not tolerate, asked RN to stop and stated she did not want be be stuck anymore. Notified cath lab team that pt only has 1 working IV and is refusing to be stuck again.

## 2020-03-26 NOTE — CDMP QUERY
Patient admitted with chest pain- PT left AMA. If possible, please clarify if you were evaluating and/or treating any of the following: 
 
NSTEMI Type 2 MI due to demand Demand Ischemia Only Unstable Angina Other, please specify Undetermined The medical record reflects the following: 
Risk Factors: HTN, chest pain, abd pain Clinical Indicators:  
Chest pain Troponin trended 0.68, 0.42, 0.20 ECG: 3/23 ecg: When compared with ECG of 22-MAR-2020 22:39,  
QRS duration has increased Serial changes of evolving Anterior infarct present Serial changes of evolving Anterolateral infarct present 3/24 Echo:  As compared to the previous study, there are significant changes. LV function has decreased. New regional wall motion abnormality present. Treatment: heparin gtt, BB, card consult, Thank you. Sandra Greenberg RN BSN CCDS [phone]

## 2020-03-26 NOTE — PROGRESS NOTES
SUBJECTIVE:    Patient continues to have some abdominal pain. No nausea or vomiting. She says she would go home if he gave her Percocet. She had bowel movement. OBJECTIVE:    /77   Pulse 68   Temp 98 °F (36.7 °C)   Resp 16   Ht 4' 11\" (1.499 m)   Wt 75.6 kg (166 lb 11.2 oz)   LMP 04/14/2009   SpO2 98%   Breastfeeding No   BMI 33.67 kg/m²     General appearance - alert, well appearing, and in no distress  Chest - decreased air entry noted in bases, no wheezes  Heart - S1 and S2 normal  Abdomen - soft, surgical scars present and some tenderness present in periumbilical area, nondistended, Bowel sounds present  Neurological - alert, oriented, normal speech, no focal findings noted while in bed  Extremities - no pedal edema noted    ASSESSMENT:    1. Chronic abdominal pain, stable  2. Partial small bowel obstruction based on CT scan, clinically better  3. Type 2 MI due to demand ischemia secondary to hypertension, normal cardiac cath  4. Hypertension  5. Transient left bundle branch block  6. Dyspnea on exertion due to underlying COPD  7. COPD  8. Dyslipidemia  9. History of prolonged heroin use  10. Tobacco use disorder    PLAN:    Continue current management  We will check KUB  Cardiac cath report reviewed  If KUB looks good, patient will be discharged home today. Disclaimer: Sections of this note are dictated using utilizing voice recognition software, which may have resulted in some phonetic based errors in grammar and contents. Even though attempts were made to correct all the mistakes, some may have been missed, and remained in the body of the document. If questions arise, please contact our department.     BMP:   Lab Results   Component Value Date/Time     03/26/2020 01:49 AM    K 3.7 03/26/2020 01:49 AM     03/26/2020 01:49 AM    CO2 29 03/26/2020 01:49 AM    AGAP 6 03/26/2020 01:49 AM     (H) 03/26/2020 01:49 AM    BUN 22 (H) 03/26/2020 01:49 AM    CREA 1.30 03/26/2020 01:49 AM    GFRAA 52 (L) 03/26/2020 01:49 AM    GFRNA 43 (L) 03/26/2020 01:49 AM     CBC:   Lab Results   Component Value Date/Time    WBC 6.4 03/26/2020 01:49 AM    HGB 12.5 03/26/2020 01:49 AM    HCT 38.4 03/26/2020 01:49 AM     03/26/2020 01:49 AM     All Cardiac Markers in the last 24 hours: No results found for: CPK, CK, CKMMB, CKMB, RCK3, CKMBT, CKNDX, CKND1, JOSE, TROPT, TROIQ, SURY, TROPT, TNIPOC, BNP, BNPP

## 2020-03-26 NOTE — PROGRESS NOTES
9: 17 AM  TRANSFER - IN REPORT:    Verbal report received from [facility], [facility] (name) on Ermelinda Blizzard  being received from 4 (unit) for ordered procedure      Report consisted of patients Situation, Background, Assessment and   Recommendations(SBAR). Information from the following report(s) SBAR, Procedure Summary, Intake/Output, MAR and Recent Results was reviewed with the receiving nurse. Opportunity for questions and clarification was provided. Assessment completed upon patients arrival to unit and care assumed. 9:46 AM  Second peripheral IV attempted. Pt. Refused IV at this time. 10:08 AM  TRANSFER - OUT REPORT:    Verbal report given to Jimmye Baumgarten (name) on Ermelinda Blizzard  being transferred to Cath Lab (unit) for ordered procedure       Report consisted of patients Situation, Background, Assessment and   Recommendations(SBAR). Information from the following report(s) SBAR, Intake/Output, MAR, Recent Results, Pre Procedure Checklist, Procedure Verification and Quality Measures was reviewed with the receiving nurse. Lines:   Peripheral IV 03/23/20 Left;Posterior; Lower Forearm (Active)   Site Assessment Clean, dry, & intact 3/26/2020  9:34 AM   Phlebitis Assessment 0 3/26/2020  9:34 AM   Infiltration Assessment 0 3/26/2020  9:34 AM   Dressing Status Clean, dry, & intact 3/26/2020  9:34 AM   Dressing Type Transparent 3/26/2020  9:34 AM   Hub Color/Line Status Pink;Patent; Flushed;Capped 3/26/2020  9:34 AM   Action Taken Open ports on tubing capped 3/25/2020  9:38 PM   Alcohol Cap Used Yes 3/26/2020  9:34 AM        Opportunity for questions and clarification was provided. Patient transported with:   Gely Huang:    TRANSFER - IN REPORT:    Verbal report received from [facility] (name) on Ermelinda Blizzard  being received from Cath Lab (unit) for routine post - op      Report consisted of patients Situation, Background, Assessment and   Recommendations(SBAR).      Information from the following report(s) SBAR, Procedure Summary, Intake/Output, MAR, Recent Results, Procedure Verification and Quality Measures was reviewed with the receiving nurse. Opportunity for questions and clarification was provided. Assessment completed upon patients arrival to unit and care assumed. 12:16 PM    Dstat pressure device removed without complication and pressure dressing applied. No bleeding or hematoma noted at site. TRANSFER - OUT REPORT:    Verbal report given to [address], RN(name) on [facility]  being transferred to  ([facility]) for routine progression of care       Report consisted of patients Situation, Background, Assessment and   Recommendations(SBAR). Information from the following report(s) SBAR, Procedure Summary, Intake/Output, MAR, Recent Results, Procedure Verification and Quality Measures was reviewed with the receiving nurse. Lines:   Peripheral IV 03/23/20 Left;Posterior; Lower Forearm (Active)   Site Assessment Clean, dry, & intact 3/26/2020  9:34 AM   Phlebitis Assessment 0 3/26/2020  9:34 AM   Infiltration Assessment 0 3/26/2020  9:34 AM   Dressing Status Clean, dry, & intact 3/26/2020  9:34 AM   Dressing Type Transparent 3/26/2020  9:34 AM   Hub Color/Line Status Pink;Patent; Flushed;Capped 3/26/2020  9:34 AM   Action Taken Open ports on tubing capped 3/25/2020  9:38 PM   Alcohol Cap Used Yes 3/26/2020  9:34 AM        Opportunity for questions and clarification was provided.       Patient transported with:   Registered Nurse

## 2020-03-26 NOTE — PROGRESS NOTES
Cath holding summary     Patient escorted to cath holding from [facility], alert and oriented x 4, voicing no complaints. Placed on monitor. NPO since MN. Lab results, med rec and H&P reviewed on chart.

## 2020-03-26 NOTE — PROGRESS NOTES
Pt refusing and became belligerent towards staff about leaving, cussing and yelling down the samuels. Lizz Baltimore , and April unit secretary were present at this moment. Patient was told that when xray was read by MD she would be discharged. Pt refuse to wait for MD. Pt was about to go down elevator with IV still in her arm. Lizz Baltimore floor managers stated that if she left we would have to contact the police. Pt stopped and let nurse remove her IV. IV removed and pt walked out. Pt had no clothing, left in a gown, no shoes. MD aware of this. Pt also refused to sign AMA paperwork.

## 2020-03-26 NOTE — ROUTINE PROCESS
Bedside shift change report given to Hayes Lindsey  (oncoming nurse) by Homer Elaine RN  (offgoing nurse). Report included the following information SBAR, Kardex and MAR.

## 2020-03-26 NOTE — PROGRESS NOTES
[facility] - Progress Note  Admit Date: 3/23/2020    Assessment:     Hospital Problems  Date Reviewed: 10/23/2019          Codes Class Noted POA    CHF (congestive heart failure) ([facility]) ICD-10-CM: I50.9  ICD-9-CM: 428.0  3/23/2020 Unknown        SBO (small bowel obstruction) ([facility] ICD-10-CM: K56.609  ICD-9-CM: 560.9  3/23/2020 Unknown        NSTEMI (non-ST elevated myocardial infarction) [facility]) ICD-10-CM: I21.4  ICD-9-CM: 410.70  3/22/2020 Unknown              --Non-ST elevation myocardial infarction.  Peak troponin only to 0.68.  Patient did have worsening ischemic EKG changes in the anterior leads now with a new atypical left bundle branch block.  Of note this has been transient in the past.  Of more concern is her echocardiogram done this morning showing a new regional wall motion abnormality in the LAD distribution and a moderately depressed LV function, EF 35 to 40%.  No chest pain this morning, but patient complaining of severe periumbilical pain likely due to SBO.  --Acute systolic heart failure.  Symptoms appear to have improved following diuretics yesterday.  Ejection fraction 35 to 40% on this morning's echocardiogram was a new finding for her. --Small bowel obstruction.  Patient has a history of multiple abdominal surgeries.  She currently complains of severe periumbilical pain which is her biggest complaint.  She is refusing an NG tube. --Hypertension.  Significant elevation likely due to severe pain. Plan:     Discussed cardiac catheterization risks, benefits, indications, alternatives. Patient understands and wishes to proceed. Continued on hep, statin, bb. Subjective:     No new complaints. No CP, No SOB.     Objective:      Patient Vitals for the past 8 hrs:   Temp Pulse Resp BP SpO2   03/26/20 0949  88 16 143/75 97 %   03/26/20 0934 98.4 °F (36.9 °C) 70 18 132/63 96 %   03/26/20 0855     95 %   03/26/20 0318 97 °F (36.1 °C) 70 20 106/66 98 %         Patient Vitals for the past 96 hrs:   Weight   03/25/20 0545 75.6 kg (166 lb 11.2 oz)   03/24/20 1058 71.2 kg (157 lb)   03/23/20 1050 71.2 kg (157 lb)                    Intake/Output Summary (Last 24 hours) at 3/26/2020 1030  Last data filed at 3/26/2020 0240  Gross per 24 hour   Intake 1203.97 ml   Output    Net 1203.97 ml       Physical Exam:  General:  alert, cooperative, no distress, appears stated age  Neck:  no JVD  Lungs:  clear to auscultation bilaterally  Heart:  regular rate and rhythm  Abdomen:  abdomen is soft without significant tenderness, masses, organomegaly or guarding  Extremities:  extremities normal, atraumatic, no cyanosis or edema    Data Review:     Labs: Results:       Chemistry Recent Labs     03/26/20  0149 03/25/20  0436 03/24/20  0135   * 98 131*    137 138   K 3.7 3.3* 3.7    102 103   CO2 29 28 29   BUN 22* 24* 23*   CREA 1.30 1.13 1.50*   CA 8.5 8.6 9.6   AGAP 6 7 6   BUCR 17 21* 15      CBC w/Diff Recent Labs     03/26/20  0149 03/25/20  0436 03/24/20  0135   WBC 6.4 7.1 7.5   RBC 4.50 4.78 5.27   HGB 12.5 13.2 14.6   HCT 38.4 39.9 44.0    219 280   GRANS 40 56 82*   LYMPH 48 34 16*   EOS 3 1 0      Cardiac Enzymes No results found for: CPK, CK, CKMMB, CKMB, RCK3, CKMBT, CKNDX, CKND1, JOSE, TROPT, TROIQ, SURY, TROPT, TNIPOC, BNP, BNPP   Coagulation Recent Labs     03/26/20  0149 03/25/20  1453   APTT >180.0* 31.5       Lipid Panel No results found for: CHOL, CHOLPOCT, CHOLX, CHLST, CHOLV, 682288, HDL, HDLP, LDL, LDLC, DLDLP, 072009, VLDLC, VLDL, TGLX, TRIGL, TRIGP, TGLPOCT, CHHD, CHHDX   BNP Lab Results   Component Value Date/Time    BNP 19.9 12/07/2015 05:23 PM    BNP 3.4 11/01/2015 03:20 PM      Liver Enzymes No results for input(s): TP, ALB, TBIL, AP, SGOT, GPT in the last 72 hours.     No lab exists for component: DBIL   Digoxin    Thyroid Studies Lab Results   Component Value Date/Time    TSH 0.18 (L) 09/27/2019 10:50 PM          Signed By: DARCIE Lott     March 26, 2020

## 2020-03-26 NOTE — PROGRESS NOTES
Problem: Falls - Risk of  Goal: *Absence of Falls  Description: Document Lacey Duncan Fall Risk and appropriate interventions in the flowsheet.   3/26/2020 0413 by Rebekah Smalls RN  Outcome: Progressing Towards Goal  Note: Fall Risk Interventions:            Medication Interventions: Patient to call before getting OOB    Elimination Interventions: Call light in reach, Patient to call for help with toileting needs           3/26/2020 0411 by Rebekah Smalls RN  Outcome: Progressing Towards Goal  Note: Fall Risk Interventions:            Medication Interventions: Patient to call before getting OOB    Elimination Interventions: Call light in reach, Patient to call for help with toileting needs              Problem: Patient Education: Go to Patient Education Activity  Goal: Patient/Family Education  3/26/2020 0413 by Rebekah Smalls RN  Outcome: Progressing Towards Goal  3/26/2020 0411 by Rebekah Smalls RN  Outcome: Progressing Towards Goal     Problem: AMI: Day 2  Goal: Off Pathway (Use only if patient is Off Pathway)  Outcome: Progressing Towards Goal  Goal: Activity/Safety  Outcome: Progressing Towards Goal  Goal: Consults, if ordered  Outcome: Progressing Towards Goal  Goal: Diagnostic Test/Procedures  Outcome: Progressing Towards Goal  Goal: Nutrition/Diet  Outcome: Progressing Towards Goal  Goal: Discharge Planning  Outcome: Progressing Towards Goal  Goal: Medications  Outcome: Progressing Towards Goal  Goal: Respiratory  Outcome: Progressing Towards Goal  Goal: Treatments/Interventions/Procedures  Outcome: Progressing Towards Goal  Goal: Psychosocial  Outcome: Progressing Towards Goal  Goal: *Optimal pain control at patient's stated goal  Outcome: Progressing Towards Goal  Goal: *Hemodynamically stable  Outcome: Progressing Towards Goal  Goal: *Demonstrates progressive activity  Outcome: Progressing Towards Goal  Goal: *Tolerating diet  Outcome: Progressing Towards Goal     Problem: Patient Education: Go to Patient Education Activity  Goal: Patient/Family Education  3/26/2020 0413 by Edmund Mcclain RN  Outcome: Progressing Towards Goal  3/26/2020 0411 by Edmund Mcclain RN  Outcome: Progressing Towards Goal

## 2020-03-27 ENCOUNTER — PATIENT OUTREACH (OUTPATIENT)
Dept: CASE MANAGEMENT | Age: 55
End: 2020-03-27

## 2020-03-27 NOTE — PROGRESS NOTES
3/27/2020 10:37 AM     CTN attempted to contact patient for COVID risk Call. Patient admitted to [address] [place] [address]  on 3/23/20 and discharged on 3/26/20 for NSTEMI. Patient left AMA. Patient was admitted on 3/22/20 - 3/23/20   Message left introducing myself, the purpose of the call and giving my contact information. Requested that patient call back at her earliest convenience.

## 2020-04-01 ENCOUNTER — PATIENT OUTREACH (OUTPATIENT)
Dept: CASE MANAGEMENT | Age: 55
End: 2020-04-01

## 2020-04-01 NOTE — PROGRESS NOTES
4/1/2020 2:46 PM    CTN attempted to contact patient for COVID risk Call. Patient admitted to [facility]  on 3/23/20 and discharged on 3/26/20 for NSTEMI. Patient left AMA. Patient was admitted on 3/22/20 - 3/23/20   Message left introducing myself, the purpose of the call and giving my contact information. Requested that patient call back at her earliest convenience.

## 2020-04-02 ENCOUNTER — APPOINTMENT (OUTPATIENT)
Dept: GENERAL RADIOLOGY | Age: 55
End: 2020-04-02
Attending: EMERGENCY MEDICINE
Payer: COMMERCIAL

## 2020-04-02 ENCOUNTER — HOSPITAL ENCOUNTER (OUTPATIENT)
Age: 55
Setting detail: OBSERVATION
Discharge: LEFT BEFORE DISCHARGE | End: 2020-04-02
Attending: EMERGENCY MEDICINE | Admitting: HOSPITALIST
Payer: COMMERCIAL

## 2020-04-02 ENCOUNTER — DOCUMENTATION ONLY (OUTPATIENT)
Dept: FAMILY MEDICINE CLINIC | Age: 55
End: 2020-04-02

## 2020-04-02 VITALS
HEART RATE: 90 BPM | TEMPERATURE: 98.7 F | OXYGEN SATURATION: 100 % | RESPIRATION RATE: 23 BRPM | DIASTOLIC BLOOD PRESSURE: 77 MMHG | SYSTOLIC BLOOD PRESSURE: 172 MMHG

## 2020-04-02 DIAGNOSIS — R07.89 CHEST TIGHTNESS: ICD-10-CM

## 2020-04-02 DIAGNOSIS — R06.00 DYSPNEA, UNSPECIFIED TYPE: ICD-10-CM

## 2020-04-02 DIAGNOSIS — R52 BODY ACHES: ICD-10-CM

## 2020-04-02 DIAGNOSIS — J44.1 ACUTE EXACERBATION OF CHRONIC OBSTRUCTIVE PULMONARY DISEASE (COPD) (HCC): Primary | ICD-10-CM

## 2020-04-02 DIAGNOSIS — R05.9 COUGH: ICD-10-CM

## 2020-04-02 PROBLEM — Z86.79 HISTORY OF CHRONIC CHF: Status: ACTIVE | Noted: 2020-04-02

## 2020-04-02 PROBLEM — J18.9 PNEUMONIA: Status: ACTIVE | Noted: 2020-04-02

## 2020-04-02 PROBLEM — Z20.822 SUSPECTED COVID-19 VIRUS INFECTION: Status: ACTIVE | Noted: 2020-04-02

## 2020-04-02 LAB
ALBUMIN SERPL-MCNC: 3.4 G/DL (ref 3.4–5)
ALBUMIN/GLOB SERPL: 0.9 {RATIO} (ref 0.8–1.7)
ALP SERPL-CCNC: 85 U/L (ref 45–117)
ALT SERPL-CCNC: 18 U/L (ref 13–56)
AMPHET UR QL SCN: NEGATIVE
ANION GAP SERPL CALC-SCNC: 6 MMOL/L (ref 3–18)
APPEARANCE UR: CLEAR
AST SERPL-CCNC: 6 U/L (ref 10–38)
ATRIAL RATE: 108 BPM
B PERT DNA SPEC QL NAA+PROBE: NOT DETECTED
BARBITURATES UR QL SCN: NEGATIVE
BASOPHILS # BLD: 0 K/UL (ref 0–0.1)
BASOPHILS NFR BLD: 0 % (ref 0–2)
BENZODIAZ UR QL: NEGATIVE
BILIRUB SERPL-MCNC: 0.4 MG/DL (ref 0.2–1)
BILIRUB UR QL: NEGATIVE
BNP SERPL-MCNC: 4121 PG/ML (ref 0–900)
BORDETELLA PARAPERTUSSIS PCR, BORPAR: NOT DETECTED
BUN SERPL-MCNC: 15 MG/DL (ref 7–18)
BUN/CREAT SERPL: 15 (ref 12–20)
C PNEUM DNA SPEC QL NAA+PROBE: NOT DETECTED
CALCIUM SERPL-MCNC: 8.5 MG/DL (ref 8.5–10.1)
CALCULATED P AXIS, ECG09: 83 DEGREES
CALCULATED R AXIS, ECG10: 71 DEGREES
CALCULATED T AXIS, ECG11: 108 DEGREES
CANNABINOIDS UR QL SCN: NEGATIVE
CHLORIDE SERPL-SCNC: 112 MMOL/L (ref 100–111)
CK MB CFR SERPL CALC: 4.6 % (ref 0–4)
CK MB SERPL-MCNC: 2.5 NG/ML (ref 5–25)
CK SERPL-CCNC: 54 U/L (ref 26–192)
CO2 SERPL-SCNC: 27 MMOL/L (ref 21–32)
COCAINE UR QL SCN: NEGATIVE
COLOR UR: YELLOW
CREAT SERPL-MCNC: 1.03 MG/DL (ref 0.6–1.3)
D DIMER PPP FEU-MCNC: 0.44 UG/ML(FEU)
DIAGNOSIS, 93000: NORMAL
DIFFERENTIAL METHOD BLD: ABNORMAL
EOSINOPHIL # BLD: 0.1 K/UL (ref 0–0.4)
EOSINOPHIL NFR BLD: 1 % (ref 0–5)
ERYTHROCYTE [DISTWIDTH] IN BLOOD BY AUTOMATED COUNT: 14.2 % (ref 11.6–14.5)
FLUAV AG NPH QL IA: NEGATIVE
FLUAV H1 2009 PAND RNA SPEC QL NAA+PROBE: NOT DETECTED
FLUAV H1 RNA SPEC QL NAA+PROBE: NOT DETECTED
FLUAV H3 RNA SPEC QL NAA+PROBE: NOT DETECTED
FLUAV SUBTYP SPEC NAA+PROBE: NOT DETECTED
FLUBV AG NOSE QL IA: NEGATIVE
FLUBV RNA SPEC QL NAA+PROBE: NOT DETECTED
GLOBULIN SER CALC-MCNC: 3.7 G/DL (ref 2–4)
GLUCOSE SERPL-MCNC: 121 MG/DL (ref 74–99)
GLUCOSE UR STRIP.AUTO-MCNC: NEGATIVE MG/DL
HADV DNA SPEC QL NAA+PROBE: NOT DETECTED
HCOV 229E RNA SPEC QL NAA+PROBE: NOT DETECTED
HCOV HKU1 RNA SPEC QL NAA+PROBE: NOT DETECTED
HCOV NL63 RNA SPEC QL NAA+PROBE: NOT DETECTED
HCOV OC43 RNA SPEC QL NAA+PROBE: NOT DETECTED
HCT VFR BLD AUTO: 36.3 % (ref 35–45)
HDSCOM,HDSCOM: ABNORMAL
HGB BLD-MCNC: 11.9 G/DL (ref 12–16)
HGB UR QL STRIP: NEGATIVE
HMPV RNA SPEC QL NAA+PROBE: NOT DETECTED
HPIV1 RNA SPEC QL NAA+PROBE: NOT DETECTED
HPIV2 RNA SPEC QL NAA+PROBE: NOT DETECTED
HPIV3 RNA SPEC QL NAA+PROBE: NOT DETECTED
HPIV4 RNA SPEC QL NAA+PROBE: NOT DETECTED
KETONES UR QL STRIP.AUTO: NEGATIVE MG/DL
L PNEUMO AG UR QL IA: NEGATIVE
LACTATE SERPL-SCNC: 1 MMOL/L (ref 0.4–2)
LACTATE SERPL-SCNC: 2.1 MMOL/L (ref 0.4–2)
LDH SERPL L TO P-CCNC: 186 U/L (ref 81–234)
LEUKOCYTE ESTERASE UR QL STRIP.AUTO: NEGATIVE
LYMPHOCYTES # BLD: 3.9 K/UL (ref 0.9–3.6)
LYMPHOCYTES NFR BLD: 34 % (ref 21–52)
M PNEUMO DNA SPEC QL NAA+PROBE: NOT DETECTED
MCH RBC QN AUTO: 28.3 PG (ref 24–34)
MCHC RBC AUTO-ENTMCNC: 32.8 G/DL (ref 31–37)
MCV RBC AUTO: 86.2 FL (ref 74–97)
METHADONE UR QL: NEGATIVE
MONOCYTES # BLD: 0.7 K/UL (ref 0.05–1.2)
MONOCYTES NFR BLD: 6 % (ref 3–10)
NEUTS SEG # BLD: 6.7 K/UL (ref 1.8–8)
NEUTS SEG NFR BLD: 59 % (ref 40–73)
NITRITE UR QL STRIP.AUTO: NEGATIVE
OPIATES UR QL: POSITIVE
P-R INTERVAL, ECG05: 118 MS
PCP UR QL: NEGATIVE
PH UR STRIP: 6 [PH] (ref 5–8)
PLATELET # BLD AUTO: 284 K/UL (ref 135–420)
PMV BLD AUTO: 10.5 FL (ref 9.2–11.8)
POTASSIUM SERPL-SCNC: 3.8 MMOL/L (ref 3.5–5.5)
PROCALCITONIN SERPL-MCNC: <0.05 NG/ML
PROT SERPL-MCNC: 7.1 G/DL (ref 6.4–8.2)
PROT UR STRIP-MCNC: NEGATIVE MG/DL
Q-T INTERVAL, ECG07: 314 MS
QRS DURATION, ECG06: 92 MS
QTC CALCULATION (BEZET), ECG08: 420 MS
RBC # BLD AUTO: 4.21 M/UL (ref 4.2–5.3)
RSV RNA SPEC QL NAA+PROBE: NOT DETECTED
RV+EV RNA SPEC QL NAA+PROBE: NOT DETECTED
S PNEUM AG UR QL: NEGATIVE
SODIUM SERPL-SCNC: 145 MMOL/L (ref 136–145)
SP GR UR REFRACTOMETRY: 1.01 (ref 1–1.03)
TROPONIN I SERPL-MCNC: 0.04 NG/ML (ref 0–0.04)
UROBILINOGEN UR QL STRIP.AUTO: 0.2 EU/DL (ref 0.2–1)
VENTRICULAR RATE, ECG03: 108 BPM
WBC # BLD AUTO: 11.5 K/UL (ref 4.6–13.2)

## 2020-04-02 PROCEDURE — 83605 ASSAY OF LACTIC ACID: CPT

## 2020-04-02 PROCEDURE — 0100U RESPIRATORY PANEL,PCR,NASOPHARYNGEAL: CPT

## 2020-04-02 PROCEDURE — 87070 CULTURE OTHR SPECIMN AEROBIC: CPT

## 2020-04-02 PROCEDURE — 87804 INFLUENZA ASSAY W/OPTIC: CPT

## 2020-04-02 PROCEDURE — 96372 THER/PROPH/DIAG INJ SC/IM: CPT

## 2020-04-02 PROCEDURE — 80307 DRUG TEST PRSMV CHEM ANLYZR: CPT

## 2020-04-02 PROCEDURE — 81003 URINALYSIS AUTO W/O SCOPE: CPT

## 2020-04-02 PROCEDURE — 99218 HC RM OBSERVATION: CPT

## 2020-04-02 PROCEDURE — 96375 TX/PRO/DX INJ NEW DRUG ADDON: CPT

## 2020-04-02 PROCEDURE — 85379 FIBRIN DEGRADATION QUANT: CPT

## 2020-04-02 PROCEDURE — 83880 ASSAY OF NATRIURETIC PEPTIDE: CPT

## 2020-04-02 PROCEDURE — 87450 LEGIONELLA PNEUMOPHILA AG, URINE: CPT

## 2020-04-02 PROCEDURE — 74011250636 HC RX REV CODE- 250/636: Performed by: EMERGENCY MEDICINE

## 2020-04-02 PROCEDURE — 74011000250 HC RX REV CODE- 250: Performed by: EMERGENCY MEDICINE

## 2020-04-02 PROCEDURE — 74011250637 HC RX REV CODE- 250/637: Performed by: EMERGENCY MEDICINE

## 2020-04-02 PROCEDURE — 93005 ELECTROCARDIOGRAM TRACING: CPT

## 2020-04-02 PROCEDURE — 94640 AIRWAY INHALATION TREATMENT: CPT

## 2020-04-02 PROCEDURE — 85025 COMPLETE CBC W/AUTO DIFF WBC: CPT

## 2020-04-02 PROCEDURE — 83615 LACTATE (LD) (LDH) ENZYME: CPT

## 2020-04-02 PROCEDURE — 84145 PROCALCITONIN (PCT): CPT

## 2020-04-02 PROCEDURE — 74011250636 HC RX REV CODE- 250/636: Performed by: PHYSICIAN ASSISTANT

## 2020-04-02 PROCEDURE — 87040 BLOOD CULTURE FOR BACTERIA: CPT

## 2020-04-02 PROCEDURE — 71045 X-RAY EXAM CHEST 1 VIEW: CPT

## 2020-04-02 PROCEDURE — 65270000029 HC RM PRIVATE

## 2020-04-02 PROCEDURE — 87449 NOS EACH ORGANISM AG IA: CPT

## 2020-04-02 PROCEDURE — 96366 THER/PROPH/DIAG IV INF ADDON: CPT

## 2020-04-02 PROCEDURE — 82550 ASSAY OF CK (CPK): CPT

## 2020-04-02 PROCEDURE — 96365 THER/PROPH/DIAG IV INF INIT: CPT

## 2020-04-02 PROCEDURE — 80053 COMPREHEN METABOLIC PANEL: CPT

## 2020-04-02 PROCEDURE — 74011250637 HC RX REV CODE- 250/637: Performed by: PHYSICIAN ASSISTANT

## 2020-04-02 PROCEDURE — 99285 EMERGENCY DEPT VISIT HI MDM: CPT

## 2020-04-02 RX ORDER — ACETAMINOPHEN 500 MG
1000 TABLET ORAL ONCE
Status: DISCONTINUED | OUTPATIENT
Start: 2020-04-02 | End: 2020-04-02 | Stop reason: HOSPADM

## 2020-04-02 RX ORDER — ACETAMINOPHEN 325 MG/1
650 TABLET ORAL
Status: DISCONTINUED | OUTPATIENT
Start: 2020-04-02 | End: 2020-04-02 | Stop reason: HOSPADM

## 2020-04-02 RX ORDER — ONDANSETRON 2 MG/ML
4 INJECTION INTRAMUSCULAR; INTRAVENOUS
Status: DISCONTINUED | OUTPATIENT
Start: 2020-04-02 | End: 2020-04-02 | Stop reason: HOSPADM

## 2020-04-02 RX ORDER — ASPIRIN 81 MG/1
81 TABLET ORAL DAILY
Status: DISCONTINUED | OUTPATIENT
Start: 2020-04-02 | End: 2020-04-02 | Stop reason: HOSPADM

## 2020-04-02 RX ORDER — HYDRALAZINE HYDROCHLORIDE 20 MG/ML
10 INJECTION INTRAMUSCULAR; INTRAVENOUS
Status: DISCONTINUED | OUTPATIENT
Start: 2020-04-02 | End: 2020-04-02 | Stop reason: HOSPADM

## 2020-04-02 RX ORDER — SODIUM CHLORIDE 0.9 % (FLUSH) 0.9 %
5-10 SYRINGE (ML) INJECTION AS NEEDED
Status: DISCONTINUED | OUTPATIENT
Start: 2020-04-02 | End: 2020-04-02 | Stop reason: HOSPADM

## 2020-04-02 RX ORDER — TRAMADOL HYDROCHLORIDE 50 MG/1
50 TABLET ORAL
Status: COMPLETED | OUTPATIENT
Start: 2020-04-02 | End: 2020-04-02

## 2020-04-02 RX ORDER — FAMOTIDINE 20 MG/1
20 TABLET, FILM COATED ORAL DAILY
Status: DISCONTINUED | OUTPATIENT
Start: 2020-04-02 | End: 2020-04-02 | Stop reason: HOSPADM

## 2020-04-02 RX ORDER — ARFORMOTEROL TARTRATE 15 UG/2ML
15 SOLUTION RESPIRATORY (INHALATION)
Status: DISCONTINUED | OUTPATIENT
Start: 2020-04-02 | End: 2020-04-02 | Stop reason: HOSPADM

## 2020-04-02 RX ORDER — IPRATROPIUM BROMIDE AND ALBUTEROL SULFATE 2.5; .5 MG/3ML; MG/3ML
3 SOLUTION RESPIRATORY (INHALATION)
Status: COMPLETED | OUTPATIENT
Start: 2020-04-02 | End: 2020-04-02

## 2020-04-02 RX ORDER — DOCUSATE SODIUM 100 MG/1
100 CAPSULE, LIQUID FILLED ORAL 2 TIMES DAILY
Status: DISCONTINUED | OUTPATIENT
Start: 2020-04-02 | End: 2020-04-02 | Stop reason: HOSPADM

## 2020-04-02 RX ORDER — ENOXAPARIN SODIUM 100 MG/ML
40 INJECTION SUBCUTANEOUS EVERY 24 HOURS
Status: DISCONTINUED | OUTPATIENT
Start: 2020-04-02 | End: 2020-04-02 | Stop reason: HOSPADM

## 2020-04-02 RX ORDER — MONTELUKAST SODIUM 10 MG/1
10 TABLET ORAL
Status: DISCONTINUED | OUTPATIENT
Start: 2020-04-02 | End: 2020-04-02 | Stop reason: HOSPADM

## 2020-04-02 RX ORDER — BUDESONIDE 0.5 MG/2ML
500 INHALANT ORAL
Status: DISCONTINUED | OUTPATIENT
Start: 2020-04-02 | End: 2020-04-02 | Stop reason: HOSPADM

## 2020-04-02 RX ORDER — MAGNESIUM SULFATE HEPTAHYDRATE 40 MG/ML
2 INJECTION, SOLUTION INTRAVENOUS ONCE
Status: COMPLETED | OUTPATIENT
Start: 2020-04-02 | End: 2020-04-02

## 2020-04-02 RX ORDER — IPRATROPIUM BROMIDE AND ALBUTEROL SULFATE 2.5; .5 MG/3ML; MG/3ML
3 SOLUTION RESPIRATORY (INHALATION)
Status: DISCONTINUED | OUTPATIENT
Start: 2020-04-02 | End: 2020-04-02 | Stop reason: HOSPADM

## 2020-04-02 RX ORDER — CLONIDINE HYDROCHLORIDE 0.1 MG/1
0.2 TABLET ORAL 2 TIMES DAILY
Status: DISCONTINUED | OUTPATIENT
Start: 2020-04-02 | End: 2020-04-02 | Stop reason: HOSPADM

## 2020-04-02 RX ORDER — AMLODIPINE BESYLATE 10 MG/1
10 TABLET ORAL DAILY
Status: DISCONTINUED | OUTPATIENT
Start: 2020-04-02 | End: 2020-04-02 | Stop reason: HOSPADM

## 2020-04-02 RX ORDER — METOPROLOL TARTRATE 25 MG/1
12.5 TABLET, FILM COATED ORAL EVERY 12 HOURS
Status: DISCONTINUED | OUTPATIENT
Start: 2020-04-02 | End: 2020-04-02 | Stop reason: HOSPADM

## 2020-04-02 RX ORDER — ATORVASTATIN CALCIUM 20 MG/1
20 TABLET, FILM COATED ORAL
Status: DISCONTINUED | OUTPATIENT
Start: 2020-04-02 | End: 2020-04-02 | Stop reason: HOSPADM

## 2020-04-02 RX ORDER — BUDESONIDE 0.5 MG/2ML
500 INHALANT ORAL 2 TIMES DAILY
Status: DISCONTINUED | OUTPATIENT
Start: 2020-04-02 | End: 2020-04-02

## 2020-04-02 RX ADMIN — IPRATROPIUM BROMIDE AND ALBUTEROL SULFATE 3 ML: .5; 3 SOLUTION RESPIRATORY (INHALATION) at 06:15

## 2020-04-02 RX ADMIN — CEFTRIAXONE SODIUM 1 G: 1 INJECTION, POWDER, FOR SOLUTION INTRAMUSCULAR; INTRAVENOUS at 09:03

## 2020-04-02 RX ADMIN — DOCUSATE SODIUM 100 MG: 100 CAPSULE, LIQUID FILLED ORAL at 09:06

## 2020-04-02 RX ADMIN — METOPROLOL TARTRATE 12.5 MG: 25 TABLET, FILM COATED ORAL at 09:04

## 2020-04-02 RX ADMIN — METHYLPREDNISOLONE SODIUM SUCCINATE 125 MG: 125 INJECTION, POWDER, FOR SOLUTION INTRAMUSCULAR; INTRAVENOUS at 06:26

## 2020-04-02 RX ADMIN — IPRATROPIUM BROMIDE AND ALBUTEROL SULFATE 3 ML: .5; 3 SOLUTION RESPIRATORY (INHALATION) at 06:28

## 2020-04-02 RX ADMIN — CLONIDINE HYDROCHLORIDE 0.2 MG: 0.1 TABLET ORAL at 09:04

## 2020-04-02 RX ADMIN — FAMOTIDINE 20 MG: 20 TABLET ORAL at 09:04

## 2020-04-02 RX ADMIN — ASPIRIN 81 MG: 81 TABLET, COATED ORAL at 09:04

## 2020-04-02 RX ADMIN — TRAMADOL HYDROCHLORIDE 50 MG: 50 TABLET, FILM COATED ORAL at 06:14

## 2020-04-02 RX ADMIN — AMLODIPINE BESYLATE 10 MG: 10 TABLET ORAL at 09:04

## 2020-04-02 RX ADMIN — MAGNESIUM SULFATE IN WATER 2 G: 40 INJECTION, SOLUTION INTRAVENOUS at 06:28

## 2020-04-02 RX ADMIN — ENOXAPARIN SODIUM 40 MG: 40 INJECTION SUBCUTANEOUS at 09:04

## 2020-04-02 RX ADMIN — AZITHROMYCIN MONOHYDRATE 500 MG: 500 INJECTION, POWDER, LYOPHILIZED, FOR SOLUTION INTRAVENOUS at 09:03

## 2020-04-02 NOTE — H&P
Hospitalist Admission History and Physical    NAME:  Yolette Rhodes   :   1965   MRN:   [id number]     PCP:  Fco Paulino NP  Date/Time:  2020 8:05 AM  Subjective:   CHIEF COMPLAINT:  SOB     HISTORY OF PRESENT ILLNESS:     Ms. Bridger Banerjee is a 54 yo female with a past medical history of NSTEMI, COPD on home O2 3LMP intermittently,HTN, systolic CHF, drug abuse with heroine, tobacco abuse who presented from home via EMS with c/o SOB x5 days. Patient states she gets SOB at rest and with exertion despite of oxygen supplementation at home. She also endorses subjective fever, productive cough with green sputum, chest tightness, wheezing. She denies chills, headache, myalgias, abd pain, n/v, diarrhea, dysuria. Patient was just at [facility] on 3/23 for SOB and NSTEMI but left AMA on 3/26, and per MD note she was requesting IV narcotic frequently. Currently, she feels low back and bilateral leg pain and asking for opioids for pain control, pain from fall 5 yrs ago. Patient lives at home with her brother and mother. She does not leave the home. Her brother gets groceries for her. She denies contact with covid suspected/confirmed pts. She states her mother and brother suffer from the same \"thing\" (she is referring to COPD?) and they too have been symptomatic with SOB and cough. ED course:   - Vital signs: 98.7 F, HR 97, /82, RR 14, 100% on RA?  - Labs remarkable for:   - Imaging: chest xray increased pulm vasculature, no acute infiltrates. - EKG: sinus tach   - Patient received:  Duonebs, solumedrol, magnesium sulfate, tramadol. Tylenol, azithromycin, ceftriaxone.      Past medical hx- see above  Past surgical hx- hernia repair 2 months ago   Allergies- no known   meds- med rec reviewed with pt   Social hx- lives with brother and mother, denies recreational drugs, still abuse tobacco does not quantify how many per day, denies etoh, ambulates with wheelchair/walker/cane     Past Medical History: Diagnosis Date    Asthma     CHF (congestive heart failure) (HCC)     Chronic obstructive pulmonary disease (HCC)     Dependence on supplemental oxygen     Endocrine disease     thyroid issues    Gastrointestinal disorder     \"blockage in my stomach\"    Hypercholesterolemia     Hypertension         Past Surgical History:   Procedure Laterality Date    HX GI      Exploratory laparotomy with lysis of adhesions and primary repair of incarcerated umbilical hernia       Social History     Tobacco Use    Smoking status: Current Every Day Smoker     Packs/day: 0.25    Smokeless tobacco: Former User   Substance Use Topics    Alcohol use: No     Comment: socially        No family history on file. No Known Allergies     Prior to Admission Medications   Prescriptions Last Dose Informant Patient Reported? Taking? OTHER   No No   Sig: Incentive spirometry- use as directed   OXYGEN-AIR DELIVERY SYSTEMS   Yes No   Sig: 3 L by IntraNASal route as needed for Other (sob). albuterol (ACCUNEB) 1.25 mg/3 mL nebu   No No   Sig: Take 3 mL by inhalation every four (4) hours as needed for Wheezing (wheezing). albuterol (PROVENTIL HFA, VENTOLIN HFA, PROAIR HFA) 90 mcg/actuation inhaler   No No   Sig: Take 2 Puffs by inhalation every four (4) hours as needed for Wheezing or Shortness of Breath. Indications: asthma attack, chronic obstructive pulmonary disease   amLODIPine (NORVASC) 10 mg tablet   No No   Sig: Take 1 Tab by mouth daily. arformoterol (BROVANA) 15 mcg/2 mL nebu neb solution   No No   Si mL by Nebulization route two (2) times a day. aspirin delayed-release 81 mg tablet   No No   Sig: Take 1 Tab by mouth daily. atorvastatin (LIPITOR) 20 mg tablet   No No   Sig: Take 1 Tab by mouth nightly. budesonide (PULMICORT) 0.5 mg/2 mL nbsp   No No   Si mL by Nebulization route two (2) times a day. cloNIDine HCl (CATAPRES) 0.2 mg tablet   No No   Sig: Take 1 Tab by mouth two (2) times a day. docusate sodium (COLACE) 100 mg capsule   No No   Sig: Take 1 Cap by mouth two (2) times a day. escitalopram oxalate (LEXAPRO) 10 mg tablet   No No   Sig: Take 1 Tab by mouth every evening. famotidine (PEPCID) 20 mg tablet   No No   Sig: Take 1 Tab by mouth daily. fluticasone furoate-vilanterol (BREO ELLIPTA) 200-25 mcg/dose inhaler   Yes No   Sig: Take 1 Puff by inhalation daily. metoprolol tartrate (LOPRESSOR) 25 mg tablet   No No   Sig: Take 0.5 Tabs by mouth every twelve (12) hours. montelukast (SINGULAIR) 10 mg tablet   No No   Sig: Take 1 Tab by mouth nightly. naloxone (NARCAN) 4 mg/actuation nasal spray   No No   Sig: Use 1 spray intranasally, then discard. Repeat with new spray every 2 min as needed for opioid overdose symptoms, alternating nostrils. ondansetron (ZOFRAN ODT) 4 mg disintegrating tablet   No No   Sig: Take 1 Tab by mouth every eight (8) hours as needed for Nausea. Indications: prevent nausea and vomiting after surgery   roflumilast (DALIRESP) 500 mcg tab tablet   No No   Sig: Take 1 Tab by mouth daily. tiotropium (SPIRIVA) 18 mcg inhalation capsule   No No   Sig: Take 1 Cap by inhalation daily. Facility-Administered Medications: None       REVIEW OF SYSTEMS:     [] Unable to obtain  ROS due to  []mental status change  []sedated   []intubated   [x]Total of 12 systems reviewed as follows:  Review of Systems  Constitutional: Patient denies: , chills, weight loss  HEENT: Patient denies: change in vision, change in hearing, rhinorrhea, sinus congestion, neck pain/stiffness, sore throat, tongue swelling, lip swelling   PULMONARY: Patient denies:   Cardiovascular: Patient denies  palpitations, paroxysmal nocturnal dyspnea, orthopnea, lower extremity edema   GASTROINTESTINAL: Patient denies: abdominal pain, nausea, vomiting, diarrhea, constipation, melena, bright red blood per rectum.    GENITOURINARY: Patient denies: urinary frequency, urgency, dysuria, hematuria  MUSCULOSKELETAL: No joint or muscle pain, no back pain, no muscle weakness, no recent trauma. DERMATOLOGIC: No rash, no itching, no lesions. ENDOCRINE: No polyuria, polydipsia, no heat or cold intolerance. No recent change in weight. HEMATOLOGICAL: No anemia or easy bruising or bleeding. NEUROLOGIC: No headache, seizures, numbness, tingling or weakness. PSY: No anxiety nor depression      Pertinent Positives include :  Fever,   shortness of breath at rest, dyspnea on exertion, cough, wheezing  chest pain  Low back pain and bilateral leg pain   Objective:   VITALS:    Visit Vitals  /77   Pulse 90   Temp 98.7 °F (37.1 °C)   Resp 23   LMP 2009   SpO2 100%     Temp (24hrs), Av.7 °F (37.1 °C), Min:98.7 °F (37.1 °C), Max:98.7 °F (37.1 °C)      PHYSICAL EXAM:   General:    Female laying in bed, no acute distress but appears uncomfortable. Head:   Normocephalic, without obvious abnormality  Eyes:   Conjunctivae clear, anicteric sclerae, pupils are equal  Nose:  Nares normal, no drainage   Throat:    Lips, mucosa, and tongue normal.    Neck:  Supple, symmetrical, no JVD. Lungs: On nasal cannula, speaking in complete sentences, wheezing throughout. Chest wall:  No tenderness or deformity. No Accessory muscle use. Heart:   Regular rate and rhythm;  no murmur, rub or gallop. Abdomen:   Soft, non-tender. Not distended. Bowel sounds normal. No masses  +++ vertical scar periumbilical- no evidence of fluctuance, erythema, warmth out of the ordinary. Extremities: No LE edema. Jumps when I press my finger on legs. Skin:     No rashes or lesions. Not Jaundiced  Psych:  Good insight. Not depressed, anxious or agitated. Neurologic: Alert and oriented x3. No facial asymmetry. No aphasia or slurred speech. Normal strength. Moving all extremities.        LAB DATA REVIEWED:    No components found for: [facility]  Recent Labs     20  0434      K 3.8   *   CO2 27   BUN 15 CREA 1.03   *   CA 8.5   ALB 3.4   WBC 11.5   HGB 11.9*   HCT 36.3            IMAGING RESULTS:   []  I have personally reviewed the actual   []CXR  []CT scan    XR Results (most recent):  Results from Hospital Encounter encounter on 04/02/20   XR CHEST PORT    Narrative CHEST PORTABLE 0439 hours    CPT CODE: 59858    COMPARISON: March 23, 2020. INDICATIONS: sepsis. FINDINGS:     Lungs: The pulmonary vasculature is congested with some cephalization evident. No edema is evident. .     Cardiac Silhouette And Mediastinal Contours: Moderate enlargement of the cardiac  contours is unchanged. Pleural Spaces: No pneumothorax or pleural effusion evident. Bones And Soft Tissues: Unremarkable for age. Impression IMPRESSION:    Enlarged cardiac contours and increased pulmonary vasculature; no acute  infiltrates evident. No change from previous. Assessment/Plan:   Patient with hx of COPD who is being admitted for worsening SOB and cough with concerns for COPD exacerbation and PNA. Being tested for COVID 19. Consults:     1. Pneumonia- community acquired v. Atypical v. COVID 19: continue azithromycin and ceftriaxone. addon procal. resp PCR panel is pending. 2. Concern for COVID-19 infection: presents with SOB, productive cough, subjective fevers etc. Lives at home with family with similar sxs. I will call NP Kali Fonseca to initiate COVID request form. I have ordered emergent disease panel. normal WBC. Lactic acid 1.0. troponin 0.04. Added on CRP, ddimer, LDH, CPK in order to risk stratify if indeed covid 19 positive. Please maintain on droplet plus precautions for now. 3. COPD with acute exacerbation: will hold home duonebs, brovana and pulmicort while in ED until patient is in a negative pressure room. Continue singulair and daliresp. 4. Hypertensive urgency: related to pain? Resume home norvasc, clonidine, lopressor. IV hydralazine as needed.    5. Chronic abdominal pain- stable, no abdominal complaints at this time. recent xray abdomen on 3/26  Normal- nonobstructive bowel gas pattern. 6. Recent partial SBO on CT abdomen/pelvis from prior admission. - was seen by Surgery, was having BMs and resolution of pain. Was tolerating diet. 7. Recent type 2 MI due to demand ischemia from HTN, s/p cardiac cath 3/26- \"Because of decline in LV function, coronary angiography was performed. This revealed no significant epicardial coronary occlusive disease. Her LV function appeared to be normal with left ventriculography. No further coronary evaluation needed. \" per Dr Keturah Courtney on 3/26/2020. Resume asa and statin and BP meds. 8. HTN: plan as #3  9. Chronic systolic Heart failure- Probnp >4k, does not appear to be decompensated at this time  10. Hx of LBBB   11. Chronic respiratory failure on home O2, 3LPM: continue supplemental oxygen as needed. 12. Dyslipidemia - statin. 13. Hx of heroine abuse - check UDS, gentry since pt on a BB. Need to clarify if she is seeing a methadone clinic. 14. Tobacco abuse  - I have counseled her on cessation and told her she can  Die of lung cancer if she keeps smoking. 15. Chronic pain- back and legs-  Tylenol v. Tramadol as needed. Try to avoid IV opioids     Microbio:  - sputum culture, blood culture, resp PCR panel- pending.   - influenza rapid test negative. - covid-19 pending. Code status: full code. DVT/GI prophylaxis: loveonx, pepcid. Cardiac diet. Needs 3N bed.    Disposition: tbd   __________________________________________________  Risk of deterioration:  []Low    [x]Moderate  []High    Care Plan discussed with:    [x]Patient   []Family    []ED Care Manager  [x]ED Doc Dews  []Specialist :    Total Time Coordinating Admission:  60    minutes    []Total Critical Care Time:     ___________________________________________________  Admitting Physician: DARCIE Alarcon

## 2020-04-02 NOTE — ED NOTES
Patient insists on doing a self flu swab. Patient instructed on the correct proper procedure. Demonstrated to patient prior procedure. Patient verbalized understanding. Technique observed. Adequate insertion of swab.

## 2020-04-02 NOTE — ED TRIAGE NOTES
Patient presents to the ED with complaints of SOB. Patient states she was having chest pain x today. Patient has a history or NSTEMI. Per EMS patient was given 1 duoneb. Patient alert and oriented x 4 upon arrival, afebrile.

## 2020-04-02 NOTE — ED NOTES
Aerosol meds held due to no negative pressure protection for airborne virus as patient is on COVID precaution. PA hospitalist made aware. Patient is in no acute distress.

## 2020-04-02 NOTE — ED PROVIDER NOTES
EMERGENCY DEPARTMENT HISTORY AND PHYSICAL EXAM    4:31 AM      Date: 4/2/2020  Patient Name: Tabatha Marquis    History of Presenting Illness     Chief Complaint   Patient presents with    Chest Pain    Shortness of Breath         History Provided By: Patient and EMS    Additional History (Context): Tabatha Marquis is a 53 y.o. female with Past medical history of COPD, asthma, hypertension, CHF, and NSTEMI, drug abuse, on home O2, who presents with chief complaint of worsening shortness of breath today for the past 5 days. Associated symptoms are chest tightness, body aches, wheezes, and cough. Per EMS they gave her 1 DuoNeb. Per EMS patient complained of fever, but on arrival to ED patient without fever. Patient reports that she was just admitted to the hospital here and admits that she left [address] last week. Patient admits that she has been out of her albuterol inhaler since she left the hospital [address] She denies any radiating chest pain, abdominal pain, leg pain, sore throat, fever, dysuria, recent travel, and to her knowledge she has not been exposed to Covid-19. No other complaints. PCP: Esmer Jean NP        Past History     Past Medical History:  Past Medical History:   Diagnosis Date    Asthma     CHF (congestive heart failure) (HCC)     Chronic obstructive pulmonary disease (HCC)     Dependence on supplemental oxygen     Endocrine disease     thyroid issues    Gastrointestinal disorder     \"blockage in my stomach\"    Hypercholesterolemia     Hypertension        Past Surgical History:  Past Surgical History:   Procedure Laterality Date    HX GI      Exploratory laparotomy with lysis of adhesions and primary repair of incarcerated umbilical hernia       Family History:  No family history on file.     Social History:  Social History     Tobacco Use    Smoking status: Current Every Day Smoker     Packs/day: 0.25    Smokeless tobacco: Former User Substance Use Topics    Alcohol use: No     Comment: socially    Drug use: Not Currently     Types: Heroin       Allergies:  No Known Allergies      Review of Systems       Review of Systems   Constitutional: Positive for chills. Negative for fever. HENT: Negative for congestion, rhinorrhea, sore throat and trouble swallowing. Eyes: Negative for visual disturbance. Respiratory: Positive for cough, chest tightness, shortness of breath and wheezing. Cardiovascular: Positive for chest pain. Negative for palpitations. Gastrointestinal: Negative for abdominal pain, constipation, nausea and vomiting. Endocrine: Negative for polyuria. Genitourinary: Negative for dysuria. Musculoskeletal: Positive for myalgias. Negative for neck stiffness. Chronic back pain, unchanged   Skin: Negative for pallor and rash. Neurological: Negative for dizziness, weakness, numbness and headaches. Hematological: Does not bruise/bleed easily. Psychiatric/Behavioral: Negative for confusion and dysphoric mood. All other systems reviewed and are negative. Physical Exam     Visit Vitals  /77   Pulse 90   Temp 98.7 °F (37.1 °C)   Resp 23   LMP 04/14/2009   SpO2 100%         Physical Exam  Vitals signs and nursing note reviewed. Constitutional:       Appearance: She is well-developed. She is not toxic-appearing or diaphoretic. HENT:      Head: Normocephalic and atraumatic. Nose: Nose normal.      Mouth/Throat:      Mouth: Mucous membranes are moist.      Pharynx: No oropharyngeal exudate or posterior oropharyngeal erythema. Eyes:      General: No scleral icterus. Extraocular Movements: Extraocular movements intact. Conjunctiva/sclera: Conjunctivae normal.      Pupils: Pupils are equal, round, and reactive to light. Neck:      Musculoskeletal: Normal range of motion and neck supple. No muscular tenderness. Cardiovascular:      Rate and Rhythm: Normal rate.       Heart sounds: Normal heart sounds. Comments: Capillary refill < 3 seconds  Pulmonary:      Effort: Respiratory distress present. Breath sounds: No stridor. Wheezing present. No rhonchi or rales. Comments: Tachypneic  Using abdominal accessory muscles  Abdominal:      General: Bowel sounds are normal. There is no distension. Palpations: Abdomen is soft. Tenderness: There is no abdominal tenderness. Musculoskeletal: Normal range of motion. Lymphadenopathy:      Cervical: No cervical adenopathy. Skin:     General: Skin is warm and dry. Coloration: Skin is not jaundiced or pale. Neurological:      General: No focal deficit present. Mental Status: She is alert and oriented to person, place, and time. Cranial Nerves: No cranial nerve deficit. Sensory: No sensory deficit. Motor: No weakness. Coordination: Coordination normal.   Psychiatric:         Thought Content:  Thought content normal.           Diagnostic Study Results     Labs -  Recent Results (from the past 12 hour(s))   EKG, 12 LEAD, INITIAL    Collection Time: 04/02/20  4:27 AM   Result Value Ref Range    Ventricular Rate 108 BPM    Atrial Rate 108 BPM    P-R Interval 118 ms    QRS Duration 92 ms    Q-T Interval 314 ms    QTC Calculation (Bezet) 420 ms    Calculated P Axis 83 degrees    Calculated R Axis 71 degrees    Calculated T Axis 108 degrees    Diagnosis       Sinus tachycardia  Possible Left atrial enlargement  Anterior infarct (cited on or before 22-MAR-2020)  T wave abnormality, consider lateral ischemia  Abnormal ECG  When compared with ECG of 23-MAR-2020 11:37,  QRS duration has decreased  Serial changes of Anterior infarct present     CBC WITH AUTOMATED DIFF    Collection Time: 04/02/20  4:34 AM   Result Value Ref Range    WBC 11.5 4.6 - 13.2 K/uL    RBC 4.21 4.20 - 5.30 M/uL    HGB 11.9 (L) 12.0 - 16.0 g/dL    HCT 36.3 35.0 - 45.0 %    MCV 86.2 74.0 - 97.0 FL    MCH 28.3 24.0 - 34.0 PG    MCHC 32.8 31.0 - 37.0 g/dL    RDW 14.2 11.6 - 14.5 %    PLATELET 714 783 - 505 K/uL    MPV 10.5 9.2 - 11.8 FL    NEUTROPHILS 59 40 - 73 %    LYMPHOCYTES 34 21 - 52 %    MONOCYTES 6 3 - 10 %    EOSINOPHILS 1 0 - 5 %    BASOPHILS 0 0 - 2 %    ABS. NEUTROPHILS 6.7 1.8 - 8.0 K/UL    ABS. LYMPHOCYTES 3.9 (H) 0.9 - 3.6 K/UL    ABS. MONOCYTES 0.7 0.05 - 1.2 K/UL    ABS. EOSINOPHILS 0.1 0.0 - 0.4 K/UL    ABS. BASOPHILS 0.0 0.0 - 0.1 K/UL    DF AUTOMATED     METABOLIC PANEL, COMPREHENSIVE    Collection Time: 04/02/20  4:34 AM   Result Value Ref Range    Sodium 145 136 - 145 mmol/L    Potassium 3.8 3.5 - 5.5 mmol/L    Chloride 112 (H) 100 - 111 mmol/L    CO2 27 21 - 32 mmol/L    Anion gap 6 3.0 - 18 mmol/L    Glucose 121 (H) 74 - 99 mg/dL    BUN 15 7.0 - 18 MG/DL    Creatinine 1.03 0.6 - 1.3 MG/DL    BUN/Creatinine ratio 15 12 - 20      GFR est AA >60 >60 ml/min/1.73m2    GFR est non-AA 56 (L) >60 ml/min/1.73m2    Calcium 8.5 8.5 - 10.1 MG/DL    Bilirubin, total 0.4 0.2 - 1.0 MG/DL    ALT (SGPT) 18 13 - 56 U/L    AST (SGOT) 6 (L) 10 - 38 U/L    Alk.  phosphatase 85 45 - 117 U/L    Protein, total 7.1 6.4 - 8.2 g/dL    Albumin 3.4 3.4 - 5.0 g/dL    Globulin 3.7 2.0 - 4.0 g/dL    A-G Ratio 0.9 0.8 - 1.7     CARDIAC PANEL,(CK, CKMB & TROPONIN)    Collection Time: 04/02/20  4:34 AM   Result Value Ref Range    CK 54 26 - 192 U/L    CK - MB 2.5 <3.6 ng/ml    CK-MB Index 4.6 (H) 0.0 - 4.0 %    Troponin-I, QT 0.04 0.0 - 0.045 NG/ML   NT-PRO BNP    Collection Time: 04/02/20  4:34 AM   Result Value Ref Range    NT pro-BNP 4,121 (H) 0 - 900 PG/ML   CULTURE, BLOOD    Collection Time: 04/02/20  4:40 AM   Result Value Ref Range    Special Requests: NO SPECIAL REQUESTS      Culture result: NO GROWTH AFTER 1 HOUR     INFLUENZA A & B AG (RAPID TEST)    Collection Time: 04/02/20  4:40 AM   Result Value Ref Range    Influenza A Antigen NEGATIVE  NEG      Influenza B Antigen NEGATIVE  NEG     LACTIC ACID    Collection Time: 04/02/20  4:51 AM   Result Value Ref Range    Lactic acid 1.0 0.4 - 2.0 MMOL/L   CULTURE, BLOOD    Collection Time: 04/02/20  4:55 AM   Result Value Ref Range    Special Requests: NO SPECIAL REQUESTS      Culture result: NO GROWTH AFTER 1 HOUR         Radiologic Studies -   XR CHEST PORT    (Results Pending)   Per my preliminary read: Possibly minimal pulmonary edema  Meka Sims DO    Medical Decision Making   I am the first provider for this patient. I reviewed the vital signs, available nursing notes, past medical history, past surgical history, family history and social history. Vital Signs-Reviewed the patient's vital signs. Pulse Oximetry Analysis -  100 on room air (Interpretation) 3 L nasal cannula; patient on chronic O2 at home    Cardiac Monitor:  Rate: 80  Rhythm:  Normal Sinus Rhythm     EKG: Interpreted by the EP Dr. Candi Tyson.    Time Interpreted: 4:27 AM   Rate: 108   Rhythm: Sinus Tachycardia    Interpretation: Normal QRS duration, normal QTC, normal axis, no ST elevation, T wave inversions in lateral leads       Records Reviewed: Nursing Notes and Old Medical Records (Time of Review: 5:58 AM)    Provider Notes (Medical Decision Making): DDX: COPD, asthma exacerbation, URI, cardiac, anxiety, CYZIO-30, metabolic, CHF exacerbation    Droplet plus, chest x-ray, EKG, labs, DuoNeb, magnesium, steroid    MDM    Medications   sodium chloride (NS) flush 5-10 mL (has no administration in time range)   acetaminophen (TYLENOL) tablet 1,000 mg (1,000 mg Oral Refused 4/2/20 0530)   albuterol-ipratropium (DUO-NEB) 2.5 MG-0.5 MG/3 ML (has no administration in time range)   azithromycin (ZITHROMAX) 500 mg in  mL (has no administration in time range)   cefTRIAXone (ROCEPHIN) 1 g in sterile water (preservative free) 10 mL IV syringe (has no administration in time range)   albuterol-ipratropium (DUO-NEB) 2.5 MG-0.5 MG/3 ML (3 mL Nebulization Given 4/2/20 0615)   albuterol-ipratropium (DUO-NEB) 2.5 MG-0.5 MG/3 ML (3 mL Nebulization Given 4/2/20 3073)   traMADoL (ULTRAM) tablet 50 mg (50 mg Oral Given 4/2/20 0614)   magnesium sulfate 2 g/50 ml IVPB (premix or compounded) (2 g IntraVENous New Bag 4/2/20 0674)   methylPREDNISolone (PF) (Solu-MEDROL) injection 125 mg (125 mg IntraVENous Given 4/2/20 7599)           ED Course: Progress Notes, Reevaluation, and Consults:  WBC within normal limits  Troponin within normal limits  Negative influenza  Lactic acid normal  proBNP 4100 which is much better than when she was recently admitted where it was 12,200    Reassessed patient and she is still in some respiratory distress with lungs tight and wheezing despite multiple duo nebs, and magnesium    I believe patient will need to come again for admission. She left AMA last week while being admitted for CHF, COPD and NSTEMI    We will consult hospitalist for admission    Consult:  Discussed care with Dr. Zully Guardado, Specialty: Hospitalist, standard discussion; including history of patients chief complaint, available diagnostic results, and treatment course. She accepts admission to [address] with remote telemetry. Agreed with ordering respiratory panel, Covid-19, sputum cultures. Also agrees with starting azithromycin. Wants me to also add Rocephin  7:50 AM, 4/2/2020     Critical care time:   I have spent 57 minutes of critical care time involved in lab review, consultations with specialist, family decision making, and documentation. During this entire length of time I was immediately available to the patient. Critical care: The reason for providing this level of medical care for this critically ill patient was due to a critical illness that impaired one or more vital organ systems such that there was a high probability of imminent or life threatening deterioration in the patients condition.  This care involved high complexity decision making to assess, manipulate and support vital system functions, to treat this degree vital organ system failure and to prevent further life threatening deterioration of the patient's condition. Diagnosis     Clinical Impression:   1. Acute exacerbation of chronic obstructive pulmonary disease (COPD) ([address]    2. Cough    3. Body aches    4. Chest tightness    5. Dyspnea, unspecified type        Disposition: Admitted    Follow-up Information    None          Patient's Medications   Start Taking    No medications on file   Continue Taking    ALBUTEROL (ACCUNEB) 1.25 MG/3 ML NEBU    Take 3 mL by inhalation every four (4) hours as needed for Wheezing (wheezing). ALBUTEROL (PROVENTIL HFA, VENTOLIN HFA, PROAIR HFA) 90 MCG/ACTUATION INHALER    Take 2 Puffs by inhalation every four (4) hours as needed for Wheezing or Shortness of Breath. Indications: asthma attack, chronic obstructive pulmonary disease    AMLODIPINE (NORVASC) 10 MG TABLET    Take 1 Tab by mouth daily. ARFORMOTEROL (BROVANA) 15 MCG/2 ML NEBU NEB SOLUTION    2 mL by Nebulization route two (2) times a day. ASPIRIN DELAYED-RELEASE 81 MG TABLET    Take 1 Tab by mouth daily. ATORVASTATIN (LIPITOR) 20 MG TABLET    Take 1 Tab by mouth nightly. BUDESONIDE (PULMICORT) 0.5 MG/2 ML NBSP    2 mL by Nebulization route two (2) times a day. CLONIDINE HCL (CATAPRES) 0.2 MG TABLET    Take 1 Tab by mouth two (2) times a day. DOCUSATE SODIUM (COLACE) 100 MG CAPSULE    Take 1 Cap by mouth two (2) times a day. ESCITALOPRAM OXALATE (LEXAPRO) 10 MG TABLET    Take 1 Tab by mouth every evening. FAMOTIDINE (PEPCID) 20 MG TABLET    Take 1 Tab by mouth daily. FLUTICASONE FUROATE-VILANTEROL (BREO ELLIPTA) 200-25 MCG/DOSE INHALER    Take 1 Puff by inhalation daily. METOPROLOL TARTRATE (LOPRESSOR) 25 MG TABLET    Take 0.5 Tabs by mouth every twelve (12) hours. MONTELUKAST (SINGULAIR) 10 MG TABLET    Take 1 Tab by mouth nightly. NALOXONE (NARCAN) 4 MG/ACTUATION NASAL SPRAY    Use 1 spray intranasally, then discard.  Repeat with new spray every 2 min as needed for opioid overdose symptoms, alternating nostrils. ONDANSETRON (ZOFRAN ODT) 4 MG DISINTEGRATING TABLET    Take 1 Tab by mouth every eight (8) hours as needed for Nausea. Indications: prevent nausea and vomiting after surgery    OTHER    Incentive spirometry- use as directed    OXYGEN-AIR DELIVERY SYSTEMS    3 L by IntraNASal route as needed for Other (sob). ROFLUMILAST (DALIRESP) 500 MCG TAB TABLET    Take 1 Tab by mouth daily. TIOTROPIUM (SPIRIVA) 18 MCG INHALATION CAPSULE    Take 1 Cap by inhalation daily. These Medications have changed    No medications on file   Stop Taking    No medications on file         Chalino Hernandez DO    Dragon medical dictation software was used for portions of this report. Unintended transcription errors may occur. My signature above authenticates this document and my orders, the final    diagnosis (es), discharge prescription (s), and instructions in the Epic    record.

## 2020-04-02 NOTE — ED NOTES
Bedside sbar shift [facility] [address] [facility] Patient alert and oriented x 4. No obvious signs of distress noted.

## 2020-04-02 NOTE — PROGRESS NOTES
Liaison for COVID Testing    Received call from provider James Perry, at 09:24 regarding requested outreach to [id number] [facility]) given concern for coronavirus infection. Online [facility] request form completed at 09:50 ([facility] site w/slow response this morning). Email from [facility] received at 12:00 for Rock Clarke - meets criteria for priority testing through [address] [place] [address] w/authorization number: [facility] [id number]. DARCIE Perry, notified via phone of authorization number. Note- pt reported to be AMA.

## 2020-04-03 ENCOUNTER — HOSPITAL ENCOUNTER (OUTPATIENT)
Age: 55
Setting detail: OBSERVATION
Discharge: LEFT BEFORE DISCHARGE | End: 2020-04-03
Attending: EMERGENCY MEDICINE | Admitting: INTERNAL MEDICINE
Payer: COMMERCIAL

## 2020-04-03 ENCOUNTER — APPOINTMENT (OUTPATIENT)
Dept: GENERAL RADIOLOGY | Age: 55
End: 2020-04-03
Attending: PHYSICIAN ASSISTANT
Payer: COMMERCIAL

## 2020-04-03 VITALS
BODY MASS INDEX: 31.71 KG/M2 | HEART RATE: 88 BPM | DIASTOLIC BLOOD PRESSURE: 85 MMHG | OXYGEN SATURATION: 100 % | WEIGHT: 157 LBS | RESPIRATION RATE: 19 BRPM | TEMPERATURE: 98 F | SYSTOLIC BLOOD PRESSURE: 132 MMHG

## 2020-04-03 DIAGNOSIS — J18.9 COMMUNITY ACQUIRED PNEUMONIA, UNSPECIFIED LATERALITY: Primary | ICD-10-CM

## 2020-04-03 DIAGNOSIS — I30.1 ACUTE VIRAL PERICARDITIS: ICD-10-CM

## 2020-04-03 DIAGNOSIS — Z20.822 SUSPECTED COVID-19 VIRUS INFECTION: ICD-10-CM

## 2020-04-03 PROBLEM — B33.23 PERICARDITIS, VIRAL: Status: ACTIVE | Noted: 2020-04-03

## 2020-04-03 PROBLEM — I51.4 MYOCARDITIS (HCC): Status: ACTIVE | Noted: 2020-04-03

## 2020-04-03 LAB
ALBUMIN SERPL-MCNC: 3.4 G/DL (ref 3.4–5)
ALBUMIN SERPL-MCNC: 3.7 G/DL (ref 3.4–5)
ALBUMIN/GLOB SERPL: 0.9 {RATIO} (ref 0.8–1.7)
ALBUMIN/GLOB SERPL: 1 {RATIO} (ref 0.8–1.7)
ALP SERPL-CCNC: 100 U/L (ref 45–117)
ALP SERPL-CCNC: 94 U/L (ref 45–117)
ALT SERPL-CCNC: 16 U/L (ref 13–56)
ALT SERPL-CCNC: 17 U/L (ref 13–56)
ANION GAP SERPL CALC-SCNC: 7 MMOL/L (ref 3–18)
ANION GAP SERPL CALC-SCNC: 7 MMOL/L (ref 3–18)
AST SERPL-CCNC: 5 U/L (ref 10–38)
AST SERPL-CCNC: 6 U/L (ref 10–38)
ATRIAL RATE: 72 BPM
ATRIAL RATE: 79 BPM
BASOPHILS # BLD: 0 K/UL (ref 0–0.1)
BASOPHILS # BLD: 0 K/UL (ref 0–0.1)
BASOPHILS NFR BLD: 0 % (ref 0–2)
BASOPHILS NFR BLD: 0 % (ref 0–2)
BILIRUB SERPL-MCNC: 0.2 MG/DL (ref 0.2–1)
BILIRUB SERPL-MCNC: 0.2 MG/DL (ref 0.2–1)
BUN SERPL-MCNC: 16 MG/DL (ref 7–18)
BUN SERPL-MCNC: 17 MG/DL (ref 7–18)
BUN/CREAT SERPL: 16 (ref 12–20)
BUN/CREAT SERPL: 16 (ref 12–20)
CALCIUM SERPL-MCNC: 8.7 MG/DL (ref 8.5–10.1)
CALCIUM SERPL-MCNC: 9.3 MG/DL (ref 8.5–10.1)
CALCULATED P AXIS, ECG09: 73 DEGREES
CALCULATED P AXIS, ECG09: 77 DEGREES
CALCULATED R AXIS, ECG10: 48 DEGREES
CALCULATED R AXIS, ECG10: 49 DEGREES
CALCULATED T AXIS, ECG11: 154 DEGREES
CALCULATED T AXIS, ECG11: 180 DEGREES
CHLORIDE SERPL-SCNC: 110 MMOL/L (ref 100–111)
CHLORIDE SERPL-SCNC: 112 MMOL/L (ref 100–111)
CK MB CFR SERPL CALC: 5.7 % (ref 0–4)
CK MB CFR SERPL CALC: 5.8 % (ref 0–4)
CK MB SERPL-MCNC: 3 NG/ML (ref 5–25)
CK MB SERPL-MCNC: 3 NG/ML (ref 5–25)
CK SERPL-CCNC: 52 U/L (ref 26–192)
CK SERPL-CCNC: 53 U/L (ref 26–192)
CO2 SERPL-SCNC: 26 MMOL/L (ref 21–32)
CO2 SERPL-SCNC: 26 MMOL/L (ref 21–32)
CREAT SERPL-MCNC: 0.99 MG/DL (ref 0.6–1.3)
CREAT SERPL-MCNC: 1.08 MG/DL (ref 0.6–1.3)
DIAGNOSIS, 93000: NORMAL
DIAGNOSIS, 93000: NORMAL
DIFFERENTIAL METHOD BLD: ABNORMAL
DIFFERENTIAL METHOD BLD: ABNORMAL
EOSINOPHIL # BLD: 0 K/UL (ref 0–0.4)
EOSINOPHIL # BLD: 0 K/UL (ref 0–0.4)
EOSINOPHIL NFR BLD: 0 % (ref 0–5)
EOSINOPHIL NFR BLD: 0 % (ref 0–5)
ERYTHROCYTE [DISTWIDTH] IN BLOOD BY AUTOMATED COUNT: 14.5 % (ref 11.6–14.5)
ERYTHROCYTE [DISTWIDTH] IN BLOOD BY AUTOMATED COUNT: 14.6 % (ref 11.6–14.5)
EST. AVERAGE GLUCOSE BLD GHB EST-MCNC: 108 MG/DL
GLOBULIN SER CALC-MCNC: 3.8 G/DL (ref 2–4)
GLOBULIN SER CALC-MCNC: 3.9 G/DL (ref 2–4)
GLUCOSE SERPL-MCNC: 125 MG/DL (ref 74–99)
GLUCOSE SERPL-MCNC: 135 MG/DL (ref 74–99)
HBA1C MFR BLD: 5.4 % (ref 4.2–5.6)
HCT VFR BLD AUTO: 36 % (ref 35–45)
HCT VFR BLD AUTO: 38.9 % (ref 35–45)
HGB BLD-MCNC: 11.4 G/DL (ref 12–16)
HGB BLD-MCNC: 12.2 G/DL (ref 12–16)
LYMPHOCYTES # BLD: 0.9 K/UL (ref 0.9–3.6)
LYMPHOCYTES # BLD: 1.6 K/UL (ref 0.9–3.6)
LYMPHOCYTES NFR BLD: 15 % (ref 21–52)
LYMPHOCYTES NFR BLD: 8 % (ref 21–52)
MAGNESIUM SERPL-MCNC: 2.2 MG/DL (ref 1.6–2.6)
MCH RBC QN AUTO: 27.7 PG (ref 24–34)
MCH RBC QN AUTO: 27.7 PG (ref 24–34)
MCHC RBC AUTO-ENTMCNC: 31.4 G/DL (ref 31–37)
MCHC RBC AUTO-ENTMCNC: 31.7 G/DL (ref 31–37)
MCV RBC AUTO: 87.4 FL (ref 74–97)
MCV RBC AUTO: 88.4 FL (ref 74–97)
MONOCYTES # BLD: 0 K/UL (ref 0.05–1.2)
MONOCYTES # BLD: 0.6 K/UL (ref 0.05–1.2)
MONOCYTES NFR BLD: 0 % (ref 3–10)
MONOCYTES NFR BLD: 6 % (ref 3–10)
NEUTS SEG # BLD: 10.1 K/UL (ref 1.8–8)
NEUTS SEG # BLD: 8.9 K/UL (ref 1.8–8)
NEUTS SEG NFR BLD: 79 % (ref 40–73)
NEUTS SEG NFR BLD: 92 % (ref 40–73)
P-R INTERVAL, ECG05: 118 MS
P-R INTERVAL, ECG05: 122 MS
PLATELET # BLD AUTO: 289 K/UL (ref 135–420)
PLATELET # BLD AUTO: 297 K/UL (ref 135–420)
PMV BLD AUTO: 10.7 FL (ref 9.2–11.8)
PMV BLD AUTO: 11.2 FL (ref 9.2–11.8)
POTASSIUM SERPL-SCNC: 4.2 MMOL/L (ref 3.5–5.5)
POTASSIUM SERPL-SCNC: 5 MMOL/L (ref 3.5–5.5)
PROCALCITONIN SERPL-MCNC: <0.05 NG/ML
PROT SERPL-MCNC: 7.3 G/DL (ref 6.4–8.2)
PROT SERPL-MCNC: 7.5 G/DL (ref 6.4–8.2)
Q-T INTERVAL, ECG07: 404 MS
Q-T INTERVAL, ECG07: 430 MS
QRS DURATION, ECG06: 88 MS
QRS DURATION, ECG06: 94 MS
QTC CALCULATION (BEZET), ECG08: 463 MS
QTC CALCULATION (BEZET), ECG08: 470 MS
RBC # BLD AUTO: 4.12 M/UL (ref 4.2–5.3)
RBC # BLD AUTO: 4.4 M/UL (ref 4.2–5.3)
SODIUM SERPL-SCNC: 143 MMOL/L (ref 136–145)
SODIUM SERPL-SCNC: 145 MMOL/L (ref 136–145)
TROPONIN I SERPL-MCNC: 0.02 NG/ML (ref 0–0.04)
TROPONIN I SERPL-MCNC: <0.02 NG/ML (ref 0–0.04)
TROPONIN I SERPL-MCNC: <0.02 NG/ML (ref 0–0.04)
VENTRICULAR RATE, ECG03: 72 BPM
VENTRICULAR RATE, ECG03: 79 BPM
WBC # BLD AUTO: 11.1 K/UL (ref 4.6–13.2)
WBC # BLD AUTO: 11.2 K/UL (ref 4.6–13.2)

## 2020-04-03 PROCEDURE — 80053 COMPREHEN METABOLIC PANEL: CPT

## 2020-04-03 PROCEDURE — 83036 HEMOGLOBIN GLYCOSYLATED A1C: CPT

## 2020-04-03 PROCEDURE — 74011250637 HC RX REV CODE- 250/637: Performed by: EMERGENCY MEDICINE

## 2020-04-03 PROCEDURE — 93005 ELECTROCARDIOGRAM TRACING: CPT

## 2020-04-03 PROCEDURE — 82550 ASSAY OF CK (CPK): CPT

## 2020-04-03 PROCEDURE — 74011250636 HC RX REV CODE- 250/636: Performed by: FAMILY MEDICINE

## 2020-04-03 PROCEDURE — 84484 ASSAY OF TROPONIN QUANT: CPT

## 2020-04-03 PROCEDURE — 83735 ASSAY OF MAGNESIUM: CPT

## 2020-04-03 PROCEDURE — 99218 HC RM OBSERVATION: CPT

## 2020-04-03 PROCEDURE — 85025 COMPLETE CBC W/AUTO DIFF WBC: CPT

## 2020-04-03 PROCEDURE — 96376 TX/PRO/DX INJ SAME DRUG ADON: CPT

## 2020-04-03 PROCEDURE — 84145 PROCALCITONIN (PCT): CPT

## 2020-04-03 PROCEDURE — 74011000250 HC RX REV CODE- 250: Performed by: EMERGENCY MEDICINE

## 2020-04-03 PROCEDURE — 99285 EMERGENCY DEPT VISIT HI MDM: CPT

## 2020-04-03 PROCEDURE — 74011250636 HC RX REV CODE- 250/636: Performed by: EMERGENCY MEDICINE

## 2020-04-03 PROCEDURE — 96375 TX/PRO/DX INJ NEW DRUG ADDON: CPT

## 2020-04-03 PROCEDURE — 96374 THER/PROPH/DIAG INJ IV PUSH: CPT

## 2020-04-03 PROCEDURE — 74011250637 HC RX REV CODE- 250/637: Performed by: INTERNAL MEDICINE

## 2020-04-03 PROCEDURE — 65660000000 HC RM CCU STEPDOWN

## 2020-04-03 PROCEDURE — 94640 AIRWAY INHALATION TREATMENT: CPT

## 2020-04-03 PROCEDURE — 74011250636 HC RX REV CODE- 250/636: Performed by: INTERNAL MEDICINE

## 2020-04-03 PROCEDURE — 36415 COLL VENOUS BLD VENIPUNCTURE: CPT

## 2020-04-03 PROCEDURE — 74011250637 HC RX REV CODE- 250/637: Performed by: FAMILY MEDICINE

## 2020-04-03 PROCEDURE — 96372 THER/PROPH/DIAG INJ SC/IM: CPT

## 2020-04-03 PROCEDURE — 71045 X-RAY EXAM CHEST 1 VIEW: CPT

## 2020-04-03 PROCEDURE — 74011636637 HC RX REV CODE- 636/637: Performed by: EMERGENCY MEDICINE

## 2020-04-03 RX ORDER — OXYCODONE AND ACETAMINOPHEN 5; 325 MG/1; MG/1
1-2 TABLET ORAL
Status: DISCONTINUED | OUTPATIENT
Start: 2020-04-03 | End: 2020-04-03

## 2020-04-03 RX ORDER — MAGNESIUM SULFATE 100 %
4 CRYSTALS MISCELLANEOUS AS NEEDED
Status: DISCONTINUED | OUTPATIENT
Start: 2020-04-03 | End: 2020-04-03 | Stop reason: HOSPADM

## 2020-04-03 RX ORDER — PREDNISONE 20 MG/1
60 TABLET ORAL
Status: COMPLETED | OUTPATIENT
Start: 2020-04-03 | End: 2020-04-03

## 2020-04-03 RX ORDER — HEPARIN SODIUM 5000 [USP'U]/ML
5000 INJECTION, SOLUTION INTRAVENOUS; SUBCUTANEOUS EVERY 8 HOURS
Status: DISCONTINUED | OUTPATIENT
Start: 2020-04-03 | End: 2020-04-03

## 2020-04-03 RX ORDER — DOCUSATE SODIUM 100 MG/1
100 CAPSULE, LIQUID FILLED ORAL 2 TIMES DAILY
Status: CANCELLED | OUTPATIENT
Start: 2020-04-03

## 2020-04-03 RX ORDER — MONTELUKAST SODIUM 10 MG/1
10 TABLET ORAL
Status: CANCELLED | OUTPATIENT
Start: 2020-04-03

## 2020-04-03 RX ORDER — ASPIRIN 81 MG/1
81 TABLET ORAL DAILY
Status: CANCELLED | OUTPATIENT
Start: 2020-04-03

## 2020-04-03 RX ORDER — ESCITALOPRAM OXALATE 10 MG/1
10 TABLET ORAL EVERY EVENING
Status: CANCELLED | OUTPATIENT
Start: 2020-04-03

## 2020-04-03 RX ORDER — GUAIFENESIN 100 MG/5ML
324 LIQUID (ML) ORAL
Status: COMPLETED | OUTPATIENT
Start: 2020-04-03 | End: 2020-04-03

## 2020-04-03 RX ORDER — ALBUTEROL SULFATE 90 UG/1
2 AEROSOL, METERED RESPIRATORY (INHALATION)
Status: CANCELLED | OUTPATIENT
Start: 2020-04-03

## 2020-04-03 RX ORDER — FAMOTIDINE 20 MG/1
20 TABLET, FILM COATED ORAL DAILY
Status: CANCELLED | OUTPATIENT
Start: 2020-04-03

## 2020-04-03 RX ORDER — AMLODIPINE BESYLATE 10 MG/1
10 TABLET ORAL
Status: COMPLETED | OUTPATIENT
Start: 2020-04-03 | End: 2020-04-03

## 2020-04-03 RX ORDER — BUDESONIDE 0.5 MG/2ML
500 INHALANT ORAL 2 TIMES DAILY
Status: CANCELLED | OUTPATIENT
Start: 2020-04-03

## 2020-04-03 RX ORDER — ENOXAPARIN SODIUM 100 MG/ML
40 INJECTION SUBCUTANEOUS EVERY 24 HOURS
Status: DISCONTINUED | OUTPATIENT
Start: 2020-04-03 | End: 2020-04-03 | Stop reason: HOSPADM

## 2020-04-03 RX ORDER — ACETAMINOPHEN 650 MG/1
650 SUPPOSITORY RECTAL
Status: DISCONTINUED | OUTPATIENT
Start: 2020-04-03 | End: 2020-04-03 | Stop reason: HOSPADM

## 2020-04-03 RX ORDER — DEXTROSE MONOHYDRATE 100 MG/ML
125-250 INJECTION, SOLUTION INTRAVENOUS AS NEEDED
Status: DISCONTINUED | OUTPATIENT
Start: 2020-04-03 | End: 2020-04-03 | Stop reason: HOSPADM

## 2020-04-03 RX ORDER — LANOLIN ALCOHOL/MO/W.PET/CERES
3 CREAM (GRAM) TOPICAL
Status: DISCONTINUED | OUTPATIENT
Start: 2020-04-03 | End: 2020-04-03 | Stop reason: HOSPADM

## 2020-04-03 RX ORDER — FUROSEMIDE 10 MG/ML
20 INJECTION INTRAMUSCULAR; INTRAVENOUS DAILY
Status: DISCONTINUED | OUTPATIENT
Start: 2020-04-03 | End: 2020-04-03 | Stop reason: HOSPADM

## 2020-04-03 RX ORDER — ACETAMINOPHEN 325 MG/1
650 TABLET ORAL
Status: DISCONTINUED | OUTPATIENT
Start: 2020-04-03 | End: 2020-04-03 | Stop reason: HOSPADM

## 2020-04-03 RX ORDER — DEXTROSE 50 % IN WATER (D50W) INTRAVENOUS SYRINGE
25-50 AS NEEDED
Status: DISCONTINUED | OUTPATIENT
Start: 2020-04-03 | End: 2020-04-03

## 2020-04-03 RX ORDER — IPRATROPIUM BROMIDE 0.5 MG/2.5ML
0.5 SOLUTION RESPIRATORY (INHALATION)
Status: CANCELLED | OUTPATIENT
Start: 2020-04-03

## 2020-04-03 RX ORDER — IPRATROPIUM BROMIDE AND ALBUTEROL SULFATE 2.5; .5 MG/3ML; MG/3ML
3 SOLUTION RESPIRATORY (INHALATION) ONCE
Status: COMPLETED | OUTPATIENT
Start: 2020-04-03 | End: 2020-04-03

## 2020-04-03 RX ORDER — CLONIDINE HYDROCHLORIDE 0.1 MG/1
0.2 TABLET ORAL 2 TIMES DAILY
Status: CANCELLED | OUTPATIENT
Start: 2020-04-03

## 2020-04-03 RX ORDER — METOPROLOL TARTRATE 25 MG/1
12.5 TABLET, FILM COATED ORAL EVERY 12 HOURS
Status: CANCELLED | OUTPATIENT
Start: 2020-04-03

## 2020-04-03 RX ORDER — INSULIN LISPRO 100 [IU]/ML
INJECTION, SOLUTION INTRAVENOUS; SUBCUTANEOUS
Status: DISCONTINUED | OUTPATIENT
Start: 2020-04-03 | End: 2020-04-03 | Stop reason: HOSPADM

## 2020-04-03 RX ORDER — ARFORMOTEROL TARTRATE 15 UG/2ML
15 SOLUTION RESPIRATORY (INHALATION) 2 TIMES DAILY
Status: CANCELLED | OUTPATIENT
Start: 2020-04-03

## 2020-04-03 RX ORDER — ATORVASTATIN CALCIUM 20 MG/1
20 TABLET, FILM COATED ORAL
Status: CANCELLED | OUTPATIENT
Start: 2020-04-03

## 2020-04-03 RX ORDER — AMLODIPINE BESYLATE 10 MG/1
10 TABLET ORAL DAILY
Status: CANCELLED | OUTPATIENT
Start: 2020-04-03

## 2020-04-03 RX ORDER — OXYCODONE AND ACETAMINOPHEN 5; 325 MG/1; MG/1
1 TABLET ORAL
Status: DISCONTINUED | OUTPATIENT
Start: 2020-04-03 | End: 2020-04-03 | Stop reason: HOSPADM

## 2020-04-03 RX ADMIN — CEFTRIAXONE SODIUM 2 G: 2 INJECTION, POWDER, FOR SOLUTION INTRAMUSCULAR; INTRAVENOUS at 02:36

## 2020-04-03 RX ADMIN — OXYCODONE AND ACETAMINOPHEN 2 TABLET: 5; 325 TABLET ORAL at 05:50

## 2020-04-03 RX ADMIN — OXYCODONE AND ACETAMINOPHEN 2 TABLET: 5; 325 TABLET ORAL at 02:35

## 2020-04-03 RX ADMIN — PREDNISONE 60 MG: 20 TABLET ORAL at 02:37

## 2020-04-03 RX ADMIN — ENOXAPARIN SODIUM 40 MG: 40 INJECTION SUBCUTANEOUS at 05:56

## 2020-04-03 RX ADMIN — IPRATROPIUM BROMIDE AND ALBUTEROL SULFATE 3 ML: .5; 3 SOLUTION RESPIRATORY (INHALATION) at 02:35

## 2020-04-03 RX ADMIN — AMLODIPINE BESYLATE 10 MG: 10 TABLET ORAL at 03:33

## 2020-04-03 RX ADMIN — METHYLPREDNISOLONE SODIUM SUCCINATE 40 MG: 40 INJECTION, POWDER, FOR SOLUTION INTRAMUSCULAR; INTRAVENOUS at 06:00

## 2020-04-03 RX ADMIN — ASPIRIN 81 MG 324 MG: 81 TABLET ORAL at 02:35

## 2020-04-03 RX ADMIN — OXYCODONE HYDROCHLORIDE AND ACETAMINOPHEN 1 TABLET: 5; 325 TABLET ORAL at 11:54

## 2020-04-03 RX ADMIN — WATER 1 G: 1 INJECTION INTRAMUSCULAR; INTRAVENOUS; SUBCUTANEOUS at 02:35

## 2020-04-03 RX ADMIN — AZITHROMYCIN MONOHYDRATE 500 MG: 500 INJECTION, POWDER, LYOPHILIZED, FOR SOLUTION INTRAVENOUS at 03:33

## 2020-04-03 RX ADMIN — FUROSEMIDE 20 MG: 10 INJECTION, SOLUTION INTRAMUSCULAR; INTRAVENOUS at 11:00

## 2020-04-03 NOTE — DISCHARGE SUMMARY
AMA NOTE:   Patient has decided to leave the hospital against medical advice. The patient is competent and understands the risks of leaving, including permanent disability and/or death. He/she has had an opportunity to ask question about their condition. The patient was informed by staff that he/she may return for care at any time.       Pt was admitted last night (4/2/20) for SOB 2/2 COPD exacerbation and COVID-19 rule out. Pt has prolonged history of drug-seeking behavior as well as being aggressive and abusive to staff. Pt was reportedly refusing blood glucose checks this am, in addition to trying to walk in the samuels despite droplet plus precautions for COVID-19 rule out and repeated reminders from staff. Pt was started on percocet 10/325 mg on admission for reported chronic abdominal pain. I decreased pt's pain medication dose this am to 5/325 mg q6hrs. I then received report from nursing that pt was demanding that I change back her narcotic pain medication to 2 pills, or that she wanted a different physician who would give it to her. When pt was told this wasn't going to happen, she then became aggressive and threatening, was using foul language with staff, reportedly stated, \"Im going to clock [place] Man in the head with this call bell,\" continued to shout and curse through the samuels and left AMA after refusing to sign AMA paperwork. I was not able to evaluate pt and attempt discussion before this transpired. PT DOES HAVE COVID-19 TEST PENDING THROUGH [facility].      Sandrita Burton,    April 3, 2020

## 2020-04-03 NOTE — ROUTINE PROCESS
TRANSFER - IN REPORT: 
 
Verbal report received from Wynn Nissen RN(name) on Daphney Saini  being received from emergency(unit) for routine progression of care Report consisted of patients Situation, Background, Assessment and  
Recommendations(SBAR). Information from the following report(s) SBAR, ED Summary, [facility] and Recent Results was reviewed with the receiving nurse. Opportunity for questions and clarification was provided. Assessment will be  completed upon patients arrival to unit and care assumed.

## 2020-04-03 NOTE — PROGRESS NOTES
0930 - Patient attempting to walk in the hallway. Informed her that if she refuses to keep door shut we will have to call security; back in bed. Refusing to use commode, states she cannot get up and walk. I informed her that she can walk as she just attempted to walk in the hallway. Refused glucose check, explained potential consequences of diabetes non-compliance. Pure wick in room but not set up to suction. 1135 - Patient upset because I was encouraging her to use the commode instead of purewick if she is able. States she has pain and swelling in her legs. Attempted to educate her regarding how ambulation can decrease swelling and pain. Patient being verbally abusive and threatening me to leave the room, \"get out of my face before I do something! \" Instructed by charge nurse to stay out of room. 78067 - Patient wanting to leave AMA. Refused to sign papers. AMA form signed by 2 nurses and placed in chart.

## 2020-04-03 NOTE — H&P
History & Physical    Patient: Daphne Collins MRN: [id number]  CSN: [id number]    YOB: 1965  Age: 53 y.o. Sex: female      DOA: 4/3/2020    Chief Complaint:   Chief Complaint   Patient presents with    Respiratory Distress          HPI:     Daphne Collins is a 53 y.o.  female who has history of non-STEMI, COPD, home oxygen use 3 L, hypertension, systolic heart failure, drug abuse, presents to the emergency room for the third time after leaving AMA less than 24 hours ago with complaints of worsening shortness of breath and dyspnea as well as cough and sputum production and chest tightness she was actually admitted but left AMA please see last admission for full details. Patient denies any coven risk factors such as travel or being around anyone was COVID positive he lives with an elderly mom who is on dialysis with no respiratory symptoms and a sister who also has COPD and asthma  Upon arrival to the emergency room patient had acute ST elevations and a code STEMI was called however she had recently had a cardiac catheterization and it was felt that her ST segments were most likely viral in nature or could be from a myocarditis cardiology will see in consultation and code STEMI was canceled     patient seen and examined with appropriate attire as she was using a nebulizer andN 95 was on    Past Medical History:   Diagnosis Date    Asthma     CHF (congestive heart failure) ([address]     Chronic obstructive pulmonary disease [address] [address]     Dependence on supplemental oxygen     Endocrine disease     thyroid issues    Gastrointestinal disorder     \"blockage in my stomach\"    Hypercholesterolemia     Hypertension        Past Surgical History:   Procedure Laterality Date    HX GI      Exploratory laparotomy with lysis of adhesions and primary repair of incarcerated umbilical hernia       No family history on file.     Social History     Socioeconomic History    Marital status: SINGLE Spouse name: Not on file    Number of children: Not on file    Years of education: Not on file    Highest education level: Not on file   Tobacco Use    Smoking status: Current Every Day Smoker     Packs/day: 0.25    Smokeless tobacco: Former User   Substance and Sexual Activity    Alcohol use: No     Comment: socially    Drug use: Not Currently     Types: Heroin    Sexual activity: Never     Comment: last used 9 years ago       Prior to Admission medications    Medication Sig Start Date End Date Taking? Authorizing Provider   aspirin delayed-release 81 mg tablet Take 1 Tab by mouth daily. 3/27/20   Carol Ann Dai MD   ondansetron (ZOFRAN ODT) 4 mg disintegrating tablet Take 1 Tab by mouth every eight (8) hours as needed for Nausea. Indications: prevent nausea and vomiting after surgery 3/26/20   Carol Ann Dai MD   famotidine (PEPCID) 20 mg tablet Take 1 Tab by mouth daily. 3/26/20   Carol Ann Dai MD   albuterol (PROVENTIL HFA, VENTOLIN HFA, PROAIR HFA) 90 mcg/actuation inhaler Take 2 Puffs by inhalation every four (4) hours as needed for Wheezing or Shortness of Breath. Indications: asthma attack, chronic obstructive pulmonary disease 1/17/20   Josue Gauthier DO   albuterol (ACCUNEB) 1.25 mg/3 mL nebu Take 3 mL by inhalation every four (4) hours as needed for Wheezing (wheezing). 12/30/19   Lolis Gaston MD   OXYGEN-AIR DELIVERY SYSTEMS 3 L by IntraNASal route as needed for Other (sob). Provider, Angie   docusate sodium (COLACE) 100 mg capsule Take 1 Cap by mouth two (2) times a day. 10/17/19   Beba Chamorro MD   fluticasone furoate-vilanterol (BREO ELLIPTA) 200-25 mcg/dose inhaler Take 1 Puff by inhalation daily. Other, MD Shalini   amLODIPine (NORVASC) 10 mg tablet Take 1 Tab by mouth daily. 7/5/19   Tico Sabillon MD   arformoterol (BROVANA) 15 mcg/2 mL nebu neb solution 2 mL by Nebulization route two (2) times a day.  3/14/19   Thai Han MD   budesonide (PULMICORT) 0.5 mg/2 mL nbsp 2 mL by Nebulization route two (2) times a day. 3/14/19   Nimo Hansen MD   tiotropium (SPIRIVA) 18 mcg inhalation capsule Take 1 Cap by inhalation daily. 3/14/19   Nimo Hansen MD   atorvastatin (LIPITOR) 20 mg tablet Take 1 Tab by mouth nightly. 3/14/19   Nimo Hansen MD   cloNIDine HCl (CATAPRES) 0.2 mg tablet Take 1 Tab by mouth two (2) times a day. 3/14/19   Nimo Hansne MD   escitalopram oxalate (LEXAPRO) 10 mg tablet Take 1 Tab by mouth every evening. 3/14/19   Nimo Hansen MD   metoprolol tartrate (LOPRESSOR) 25 mg tablet Take 0.5 Tabs by mouth every twelve (12) hours. 3/14/19   Nimo Hansen MD   roflumilast (DALIRESP) 500 mcg tab tablet Take 1 Tab by mouth daily. 3/15/19   Nimo Hansen MD   OTHER Incentive spirometry- use as directed 3/14/19   Nimo Hansen MD   naloxone [facility]) 4 mg/actuation nasal spray Use 1 spray intranasally, then discard. Repeat with new spray every 2 min as needed for opioid overdose symptoms, alternating nostrils. 3/14/19   Nimo Hansen MD   montelukast (SINGULAIR) 10 mg tablet Take 1 Tab by mouth nightly. 3/9/17   Reba Hensley MD       No Known Allergies      Review of Systems  GENERAL: Patient alert, awake and oriented times 3, able to communicate full sentences and not in distress. HEENT: No change in vision, no earache, tinnitus, sore throat or sinus congestion. NECK: No pain or stiffness. PULMONARY: + shortness of breath, cough or wheeze. Cardiovascular: no pnd or orthopnea, no CP  GASTROINTESTINAL:+abdominal pain, nausea, vomiting or diarrhea, melena or bright red blood per rectum. GENITOURINARY: No urinary frequency, urgency, hesitancy or dysuria. MUSCULOSKELETAL: No joint or muscle pain, no back pain, no recent trauma. DERMATOLOGIC: No rash, no itching, no lesions. ENDOCRINE: No polyuria, polydipsia, no heat or cold intolerance. No recent change in weight. HEMATOLOGICAL: No anemia or easy bruising or bleeding. NEUROLOGIC: No headache, seizures, numbness, tingling + weakness. Physical Exam:     Physical Exam:  Visit Vitals  /84   Pulse 100   Temp 98.7 °F (37.1 °C)   Resp 25   Wt 71.2 kg (157 lb)   LMP 2009   SpO2 96%   BMI 31.71 kg/m²    O2 Flow Rate (L/min): 4 l/min O2 Device: Nasal cannula    Temp (24hrs), Av.7 °F (37.1 °C), Min:98.7 °F (37.1 °C), Max:98.7 °F (37.1 °C)    No intake/output data recorded. No intake/output data recorded. General:  Alert, cooperative, no distress, appears stated age. Head: Normocephalic, without obvious abnormality, atraumatic. Eyes:  Conjunctivae/corneas clear. PERRL, EOMs intact. Nose: Nares normal. No drainage or sinus tenderness. Neck: Supple, symmetrical, trachea midline, no adenopathy, thyroid: no enlargement, no carotid bruit and no JVD. Lungs:   Clear to auscultation bilaterally. Diffuse expiratory wheeze    Heart:  Regular rate and rhythm, S1, S2 normal.     Abdomen: Soft, non-tender. Bowel sounds normal. Surgical scar and tenderness    Extremities: Extremities normal, atraumatic, no cyanosis or edema. Pulses: 2+ and symmetric all extremities. Skin:  No rashes or lesions   Neurologic: AAOx3, No focal motor or sensory deficit. Labs Reviewed: All lab results for the last 24 hours reviewed. CXR,  and EKG    Procedures/imaging: see electronic medical records for all procedures/Xrays and details which were not copied into this note but were reviewed prior to creation of Plan      Assessment/Plan     Active Problems:  1. COPD exacerbation we will start on IV Solu-Medrol duo nebsContinue home oxygen cover with Rocephin and Zithromax  She is a covered rule out but will not start on Plaquenil due to no atypical infiltrates on x-ray  2. Myocarditis with recent non-STEMI cardiology consulted check cardiac enzymes  3.   Hypertensive urgency has not been on her home meds for over 14 days resume her Norvasc clonidine Lopressor and diuretics and give IV hydralazine as needed  DVT/GI Prophylaxis: Hep SQ  4. Diabetes cover with sliding scale insulin  5. Recent partial small bowel obstruction with chronic abdominal pain we will resume her Percocet  6. Tobacco disorder starting on nicotine patch. Discussed with patient at bedside about hospital admission and my plan care, who understood and agree with my plan care.     Milan Hernandez MD  4/3/2020 2:27 AM

## 2020-04-03 NOTE — ED NOTES
In and out of patient room, appears angry, yelling and verbally assaulting to this nurse intermittently, when attempting care for patient. Continues to take BP cuff off, reapplied multiple times, elevated BP.

## 2020-04-03 NOTE — PROGRESS NOTES
Reason for Admission:   Pericarditis, viral [B33.23]  Suspected Covid-19 Virus Infection [R68.89]  CAP (community acquired pneumonia) [J18.9]               RRAT Score:     61             Resources/supports as identified by patient/family:       Top Challenges facing patient (as identified by patient/family and CM):     NON-COMPLIANCE, PT HAS SIGNED OUT AMA ON 3/26/20 AND 4/2/20    Finances/Medication cost?     NO NEEDS IDENTIFED  Transportation      medicaid  Support system or lack thereof?   family  Living arrangements? Lives with mother and daughter   Self-care/ADLs/Cognition? Needs assistance        Current Advanced Directive/Advance Care Plan:   yes                          Plan for utilizing home health:    no                      Likelihood of readmission:   HIGH    Transition of Care Plan:                    Initial assessment completed with past medical records. Cognitive status of patient: oriented to time, place, person and situation. Face sheet information confirmed:  yes. The patient designates Jatinder Culp [phone] to participate in her discharge plan and to receive any needed information. This patient lives in a single family home with patient, mother and daughter. Patient is able to navigate steps as needed. Prior to hospitalization, patient was considered to be independent with ADLs/IADLS : no . If not independent,  patient needs assist with : dressing, bathing, food preparation, cooking, toileting and grooming    Patient has a current ACP document on file: yes  The patient and daughter will be available to transport patient home upon discharge. The patient already has [employer], W/C, oxygen 2L from [employer] [facility] equipment available in the home. Patient is not currently active with home health. Patient has not stayed in a skilled nursing facility or rehab. Was  stay within last 60 days : no. This patient is on dialysis :no    Freedom of choice signed: no. Currently, the discharge plan is Home. The patient states that she can obtain her medications from the pharmacy, and take her medications as directed. Patient's current insurance is [employer]. Care Management Interventions  PCP Verified by CM:  Yes  Mode of Transport at Discharge: Self  Current Support Network: [address] Follow Up Transport: Family  The Plan for Transition of Care is Related to the Following Treatment Goals : HOME  Discharge Location  Discharge Placement: Home        Steven Wheat RN BSN  Care Manager  [phone]

## 2020-04-03 NOTE — ED NOTES
TRANSFER - OUT REPORT:    Verbal report given to HOUSTON Mackenzie RN (name) on Leanna Gordon  being transferred to [address](unit) for routine post - op       Report consisted of patients Situation, Background, Assessment and   Recommendations(SBAR). Information from the following report(s) SBAR, Kardex, ED Summary, Procedure Summary, Intake/Output, MAR, Recent Results and Cardiac Rhythm sinus rhythm was reviewed with the receiving nurse. Lines:       Opportunity for questions and clarification was provided.       Patient transported with:   Monitor  Registered Nurse

## 2020-04-03 NOTE — PROGRESS NOTES
Chaplains can provide Spiritual Care to [facility] patients via phone conference. Patients can call Spiritual Care at [phone], or dial; or  dial 0  on their hospital phones and ask the  to page a . No physical visits will be initiated by Chaplains while patients are in isolation for [facility]. Connect care will be noted with [facility] (do not visit) until patients are no longer in [address] isolation. If a COVID19 patient has a phone conference with a ; connect care will be noted IV-SA-[facility]-(followed by the Chaplains initials).       [phone]

## 2020-04-03 NOTE — PROGRESS NOTES
Received call from [employer] at [address] [place] [address]. She is trying to reauthorize pts oxygen but has been unable to get in touch with her. Informed her pt signed herself out AMA today. Because pt signs out AMA, there are no MD orders I can send her for oxygen.   Alvin Kehr RN - Outcomes Manager  [phone]

## 2020-04-03 NOTE — ED TRIAGE NOTES
Patient A/O x 4, brought into ED via [facility] with difficulty breathing. Patient denies chest pain, N/V, abdominal pain. Patient was seen in ED yesterday with same complaint and eloped.

## 2020-04-03 NOTE — ED PROVIDER NOTES
EMERGENCY DEPARTMENT HISTORY AND PHYSICAL EXAM    2:55 AM  Date: 4/3/2020  Patient Name: Abraham Webb    History of Presenting Illness     Chief Complaint   Patient presents with    Respiratory Distress        History Provided By: Patient    HPI: Abraham Webb is a 53 y.o. female with history multiple medical problems as below. Patient is presenting with worsening of her shortness of breath. She was admitted few days ago for pneumonia and possible covid and signed out AMA. She also came back yesterday and was admitted and again signed out [facility] and left. Patient is reporting intermittent chest pain, worsening of her cough and shortness of breath. She is noncompliant to her home oxygen and medications. Has prolonged history of drug-seeking behavior as well as being aggressive and abusive to staff. Location:  Severity:  Timing/course: Onset/Duration:     PCP: Khalif Barrett NP    Past History     Past Medical History:  Past Medical History:   Diagnosis Date    Asthma     CHF (congestive heart failure) (HCC)     Chronic obstructive pulmonary disease (HCC)     Dependence on supplemental oxygen     Endocrine disease     thyroid issues    Gastrointestinal disorder     \"blockage in my stomach\"    Hypercholesterolemia     Hypertension        Past Surgical History:  Past Surgical History:   Procedure Laterality Date    HX GI      Exploratory laparotomy with lysis of adhesions and primary repair of incarcerated umbilical hernia       Family History:  No family history on file. Social History:  Social History     Tobacco Use    Smoking status: Current Every Day Smoker     Packs/day: 0.25    Smokeless tobacco: Former User   Substance Use Topics    Alcohol use: No     Comment: socially    Drug use: Not Currently     Types: Heroin       Allergies:  No Known Allergies    Review of Systems   Review of Systems   Constitutional: Positive for fatigue.    Respiratory: Positive for cough, shortness of breath and wheezing. Cardiovascular: Positive for chest pain. All other systems reviewed and are negative. Physical Exam     Patient Vitals for the past 12 hrs:   Temp Pulse Resp BP SpO2   04/03/20 0245  98 12  100 %   04/03/20 0230  (!) 110 20  100 %   04/03/20 0215  93 17 (!) 224/94 99 %   04/03/20 0200  81 13 (!) 185/98 100 %   04/03/20 0145  87 12 192/81 (!) 88 %   04/03/20 0130  100 25 192/84 96 %   04/03/20 0115  88 13  100 %   04/03/20 0100  83 16 (!) 54/25 99 %   04/03/20 0045  92 16  96 %   04/03/20 0030  85 27 186/90 99 %   04/03/20 0027  84 22 182/83 99 %   04/03/20 0026 98.7 °F (37.1 °C) 84 25 (!) 203/98 100 %   04/03/20 0015  98 18  100 %       Physical Exam  Vitals signs and nursing note reviewed. Constitutional:       Appearance: Normal appearance. HENT:      Head: Normocephalic and atraumatic. Nose: Nose normal.   Eyes:      Extraocular Movements: Extraocular movements intact. Neck:      Musculoskeletal: Neck supple. Cardiovascular:      Rate and Rhythm: Normal rate. Pulmonary:      Effort: Pulmonary effort is normal. No respiratory distress. Musculoskeletal: Normal range of motion. Neurological:      General: No focal deficit present. Mental Status: She is alert and oriented to person, place, and time.    Psychiatric:         Mood and Affect: Mood normal.         Behavior: Behavior normal.         Diagnostic Study Results     Labs -  Recent Results (from the past 12 hour(s))   CBC WITH AUTOMATED DIFF    Collection Time: 04/03/20 12:20 AM   Result Value Ref Range    WBC 11.2 4.6 - 13.2 K/uL    RBC 4.12 (L) 4.20 - 5.30 M/uL    HGB 11.4 (L) 12.0 - 16.0 g/dL    HCT 36.0 35.0 - 45.0 %    MCV 87.4 74.0 - 97.0 FL    MCH 27.7 24.0 - 34.0 PG    MCHC 31.7 31.0 - 37.0 g/dL    RDW 14.5 11.6 - 14.5 %    PLATELET 601 284 - 320 K/uL    MPV 11.2 9.2 - 11.8 FL    NEUTROPHILS 79 (H) 40 - 73 %    LYMPHOCYTES 15 (L) 21 - 52 %    MONOCYTES 6 3 - 10 %    EOSINOPHILS 0 0 - 5 %    BASOPHILS 0 0 - 2 %    ABS. NEUTROPHILS 8.9 (H) 1.8 - 8.0 K/UL    ABS. LYMPHOCYTES 1.6 0.9 - 3.6 K/UL    ABS. MONOCYTES 0.6 0.05 - 1.2 K/UL    ABS. EOSINOPHILS 0.0 0.0 - 0.4 K/UL    ABS. BASOPHILS 0.0 0.0 - 0.1 K/UL    DF AUTOMATED     METABOLIC PANEL, COMPREHENSIVE    Collection Time: 04/03/20  1:30 AM   Result Value Ref Range    Sodium 145 136 - 145 mmol/L    Potassium 4.2 3.5 - 5.5 mmol/L    Chloride 112 (H) 100 - 111 mmol/L    CO2 26 21 - 32 mmol/L    Anion gap 7 3.0 - 18 mmol/L    Glucose 125 (H) 74 - 99 mg/dL    BUN 17 7.0 - 18 MG/DL    Creatinine 1.08 0.6 - 1.3 MG/DL    BUN/Creatinine ratio 16 12 - 20      GFR est AA >60 >60 ml/min/1.73m2    GFR est non-AA 53 (L) >60 ml/min/1.73m2    Calcium 8.7 8.5 - 10.1 MG/DL    Bilirubin, total 0.2 0.2 - 1.0 MG/DL    ALT (SGPT) 17 13 - 56 U/L    AST (SGOT) 5 (L) 10 - 38 U/L    Alk.  phosphatase 94 45 - 117 U/L    Protein, total 7.3 6.4 - 8.2 g/dL    Albumin 3.4 3.4 - 5.0 g/dL    Globulin 3.9 2.0 - 4.0 g/dL    A-G Ratio 0.9 0.8 - 1.7     TROPONIN I    Collection Time: 04/03/20  1:30 AM   Result Value Ref Range    Troponin-I, QT 0.02 0.0 - 0.045 NG/ML   EKG, 12 LEAD, INITIAL    Collection Time: 04/03/20  1:52 AM   Result Value Ref Range    Ventricular Rate 79 BPM    Atrial Rate 79 BPM    P-R Interval 122 ms    QRS Duration 88 ms    Q-T Interval 404 ms    QTC Calculation (Bezet) 463 ms    Calculated P Axis 73 degrees    Calculated R Axis 48 degrees    Calculated T Axis 154 degrees    Diagnosis       Age and gender specific ECG analysis  Normal sinus rhythm  Anterior infarct (cited on or before 22-MAR-2020)  T wave abnormality, consider lateral ischemia  ** ** ACUTE MI / STEMI ** **  Abnormal ECG  When compared with ECG of 02-APR-2020 04:27,  T wave inversion more evident in Anterior leads     EKG, 12 LEAD, SUBSEQUENT    Collection Time: 04/03/20  2:01 AM   Result Value Ref Range    Ventricular Rate 72 BPM    Atrial Rate 72 BPM    P-R Interval 118 ms    QRS Duration 94 ms    Q-T Interval 430 ms    QTC Calculation (Bezet) 470 ms    Calculated P Axis 77 degrees    Calculated R Axis 49 degrees    Calculated T Axis 180 degrees    Diagnosis       Age and gender specific ECG analysis  Normal sinus rhythm  Anterior infarct (cited on or before 22-MAR-2020)  Marked T wave abnormality, consider inferolateral ischemia  ** ** ACUTE MI / STEMI ** **  Abnormal ECG  When compared with ECG of 03-APR-2020 01:52,  No significant change was found         Radiologic Studies -   Xr Chest Port    Result Date: 4/2/2020  IMPRESSION: Enlarged cardiac contours and increased pulmonary vasculature; no acute infiltrates evident. No change from previous. Medical Decision Making     ED Course: Progress Notes, Reevaluation, and Consults:    2:55 AM Initial assessment performed. The patients presenting problems have been discussed, and they/their family are in agreement with the care plan formulated and outlined with them. I have encouraged them to ask questions as they arise throughout their visit. EKG showed new ST elevations and given that the patient was having chest pressure compared to prior EKGs these elevations are new so I had activated STEMI however based on chart review I noticed that the patient had a cath few days ago that was completely normal.  When Dr. Poonam Land the cardiologist called I discussed that with him and he agreed on the plan of canceling that code STEMI. This is likely pericarditis from the viral infection that she is having. Case was discussed with the hospitalist Dr. Demetri Zaragoza who accepted the patient. Provider Notes (Medical Decision Making): 65-year-old female with CHF and COPD brought in by ambulance for respiratory distress. Patient was tachypneic at first but upon arrival to the ER she was not in any respiratory distress. Satting 100% on her own O2. Signed out twice within the past week and she is currently being investigated for Covid 19.   I had asked the patient to use her MDI and will resume her inpatient treatment with antibiotics and duo nebs and steroids. Results are still pending. Procedures:     Critical Care Time: Upon my evaluation, this patient had a high probability of imminent or life-threatening deterioration due to COPD exacerbation, which required my direct attention, intervention, and personal management. I have personally provided 35 minutes of critical care time exclusive of time spent on separately billable procedures. Time includes review of laboratory data, radiology results, discussion with consultants, and monitoring for potential decompensation. Interventions were performed as documented above. Nick Zarate MD  2:58 AM        Vital Signs-Reviewed the patient's vital signs. Reviewed pt's pulse ox reading. EKG: Interpreted by the EP. Time Interpreted:    Rate:    Rhythm:    Interpretation:   Comparison:     Records Reviewed: Nursing Notes, Old Medical Records and Previous electrocardiograms (Time of Review: 2:55 AM)  -I am the first provider for this patient.  -I reviewed the vital signs, available nursing notes, past medical history, past surgical history, family history and social history. Current Facility-Administered Medications   Medication Dose Route Frequency Provider Last Rate Last Dose    azithromycin (ZITHROMAX) 500 mg in  mL  500 mg IntraVENous Q24H Nick Zarate MD        cefTRIAXone (ROCEPHIN) 2 g in sterile water (preservative free) 20 mL IV syringe  2 g IntraVENous Q24H Nick Zarate MD   2 g at 04/03/20 0236     Current Outpatient Medications   Medication Sig Dispense Refill    aspirin delayed-release 81 mg tablet Take 1 Tab by mouth daily. 30 Tab 0    ondansetron (ZOFRAN ODT) 4 mg disintegrating tablet Take 1 Tab by mouth every eight (8) hours as needed for Nausea.  Indications: prevent nausea and vomiting after surgery 20 Tab 0    famotidine (PEPCID) 20 mg tablet Take 1 Tab by mouth daily. 30 Tab 1    albuterol (PROVENTIL HFA, VENTOLIN HFA, PROAIR HFA) 90 mcg/actuation inhaler Take 2 Puffs by inhalation every four (4) hours as needed for Wheezing or Shortness of Breath. Indications: asthma attack, chronic obstructive pulmonary disease 1 Inhaler 0    albuterol (ACCUNEB) 1.25 mg/3 mL nebu Take 3 mL by inhalation every four (4) hours as needed for Wheezing (wheezing). 25 Each 0    OXYGEN-AIR DELIVERY SYSTEMS 3 L by IntraNASal route as needed for Other (sob).  docusate sodium (COLACE) 100 mg capsule Take 1 Cap by mouth two (2) times a day. 120 Cap 0    fluticasone furoate-vilanterol (BREO ELLIPTA) 200-25 mcg/dose inhaler Take 1 Puff by inhalation daily.  amLODIPine (NORVASC) 10 mg tablet Take 1 Tab by mouth daily. 30 Tab 0    arformoterol (BROVANA) 15 mcg/2 mL nebu neb solution 2 mL by Nebulization route two (2) times a day. 60 Vial 0    budesonide (PULMICORT) 0.5 mg/2 mL nbsp 2 mL by Nebulization route two (2) times a day. 60 Each 0    tiotropium (SPIRIVA) 18 mcg inhalation capsule Take 1 Cap by inhalation daily. 30 Cap 0    atorvastatin (LIPITOR) 20 mg tablet Take 1 Tab by mouth nightly. 30 Tab 0    cloNIDine HCl (CATAPRES) 0.2 mg tablet Take 1 Tab by mouth two (2) times a day. 60 Tab 0    escitalopram oxalate (LEXAPRO) 10 mg tablet Take 1 Tab by mouth every evening. 30 Tab 0    metoprolol tartrate (LOPRESSOR) 25 mg tablet Take 0.5 Tabs by mouth every twelve (12) hours. 30 Tab 0    roflumilast (DALIRESP) 500 mcg tab tablet Take 1 Tab by mouth daily. 30 Tab 0    OTHER Incentive spirometry- use as directed 1 Each 0    naloxone (NARCAN) 4 mg/actuation nasal spray Use 1 spray intranasally, then discard. Repeat with new spray every 2 min as needed for opioid overdose symptoms, alternating nostrils. 1 Each 0    montelukast (SINGULAIR) 10 mg tablet Take 1 Tab by mouth nightly. 30 Tab 1        Clinical Impression     Clinical Impression: No diagnosis found.     Disposition: admit      This note was dictated utilizing voice recognition software which may lead to typographical errors. I apologize in advance if the situation occurs. If questions arise please do not hesitate to contact me or call our department.     Nick Blanco MD  2:55 AM

## 2020-04-03 NOTE — PROGRESS NOTES
Assumed care of patient from emergency room. Patient demanding ice cream and pop-sicles. Unable to accommodate @ this time. Patient unwilling to answer questions on data base @ present. Patient placed on telemetry box, call bell swithcteded out, unable to hear TV on call bell. Medications given Bedside commode @ bedside. Plan to set up [facility]. Patient has shortness of breath @ rest. Remains on 5  Lpm. Patient stated. \" I don't have that virus, I haven't been nowhere , if I have it I had it for 17-18 years. Patient educated about droplet plus precautions and clustering care. Report given to oncoming nurse.   Patient Vitals for the past 4 hrs:   Temp Pulse Resp BP SpO2   04/03/20 0809 98.1 °F (36.7 °C) 85 20 187/84 100 %   04/03/20 0515 97.1 °F (36.2 °C) 89 20 (!) 191/91 100 %

## 2020-04-04 LAB
BACTERIA SPEC CULT: NORMAL
EMERGENT DISEASE PANEL, EDPR: NOT DETECTED
GRAM STN SPEC: NORMAL
SERVICE CMNT-IMP: NORMAL

## 2020-04-06 ENCOUNTER — PATIENT OUTREACH (OUTPATIENT)
Dept: CASE MANAGEMENT | Age: 55
End: 2020-04-06

## 2020-04-06 NOTE — PROGRESS NOTES
4/6/2020 10:37 AM     CTN attempted to contact patient for COVID risk Call. Patient admitted to [address] [place] [address]  on 4/3/20and discharged on 3/26/20 for COPD exacerbation and COVID 19 rule out. Patient is COVID 19 negative. Patient left AMA. Message left introducing myself, the purpose of the call and giving my contact information. Requested that patient call back at her earliest convenience.

## 2020-04-07 ENCOUNTER — PATIENT OUTREACH (OUTPATIENT)
Dept: CASE MANAGEMENT | Age: 55
End: 2020-04-07

## 2020-04-07 NOTE — PROGRESS NOTES
4/7/2020 9:58 AM    CTN attempted to contact patient for COVID risk Call. Patient admitted to [facility]  on 4/3/20and discharged on 3/26/20 for COPD exacerbation and COVID 19 rule out. Patient is COVID 19 negative. Patient left AMA. Message left introducing myself, the purpose of the call and giving my contact information. Requested that patient call back at her earliest convenience.

## 2020-04-08 ENCOUNTER — PATIENT OUTREACH (OUTPATIENT)
Dept: CASE MANAGEMENT | Age: 55
End: 2020-04-08

## 2020-04-08 NOTE — PROGRESS NOTES
4/8/2020 2:22 PM    CTN attempted to contact patient for COVID risk Call. Patient admitted to [facility]  on 4/3/20and discharged on 3/26/20 for COPD exacerbation and COVID 19 rule out. Patient is COVID 19 negative. Patient left AMA. Message left introducing myself, the purpose of the call and giving my contact information. Requested that patient call back at her earliest convenience. Unable to reach patient x 3 attempts. Episode closed.

## 2020-04-09 NOTE — ADT AUTH CERT NOTES
DOWNGRADED TO OBSERVATION 
 
ONCE RECIEVED AND COMPLETED PLEASE FAX BACK CONFIRMATION AND NEW OBS AUTH# OR WHETHER OR NOT OBS REQUIRES AN AUTH# 
 
THANK YOU

## 2020-04-26 ENCOUNTER — HOSPITAL ENCOUNTER (EMERGENCY)
Age: 55
Discharge: HOME | End: 2020-04-26
Attending: EMERGENCY MEDICINE | Admitting: EMERGENCY MEDICINE
Payer: MEDICAID

## 2020-04-26 ENCOUNTER — HOSPITAL ENCOUNTER (OUTPATIENT)
Age: 55
Setting detail: OBSERVATION
Discharge: HOME | End: 2020-04-27
Attending: EMERGENCY MEDICINE | Admitting: INTERNAL MEDICINE
Payer: MEDICAID

## 2020-04-26 VITALS
RESPIRATION RATE: 28 BRPM | OXYGEN SATURATION: 100 % | WEIGHT: 156 LBS | DIASTOLIC BLOOD PRESSURE: 87 MMHG | HEART RATE: 60 BPM | BODY MASS INDEX: 31.45 KG/M2 | HEIGHT: 59 IN | TEMPERATURE: 98 F | SYSTOLIC BLOOD PRESSURE: 164 MMHG

## 2020-04-26 DIAGNOSIS — I20.0 UNSTABLE ANGINA (HCC): Primary | ICD-10-CM

## 2020-04-26 DIAGNOSIS — I20.9 ISCHEMIC CHEST PAIN (HCC): ICD-10-CM

## 2020-04-26 DIAGNOSIS — J44.1 COPD EXACERBATION (HCC): Primary | ICD-10-CM

## 2020-04-26 LAB
ATRIAL RATE: 70 BPM
CALCULATED P AXIS, ECG09: 82 DEGREES
CALCULATED R AXIS, ECG10: -51 DEGREES
CALCULATED T AXIS, ECG11: 106 DEGREES
DIAGNOSIS, 93000: NORMAL
P-R INTERVAL, ECG05: 160 MS
Q-T INTERVAL, ECG07: 454 MS
QRS DURATION, ECG06: 130 MS
QTC CALCULATION (BEZET), ECG08: 490 MS
VENTRICULAR RATE, ECG03: 70 BPM

## 2020-04-26 PROCEDURE — 74011250636 HC RX REV CODE- 250/636: Performed by: EMERGENCY MEDICINE

## 2020-04-26 PROCEDURE — 96375 TX/PRO/DX INJ NEW DRUG ADDON: CPT

## 2020-04-26 PROCEDURE — 99285 EMERGENCY DEPT VISIT HI MDM: CPT

## 2020-04-26 PROCEDURE — 93005 ELECTROCARDIOGRAM TRACING: CPT

## 2020-04-26 PROCEDURE — 94762 N-INVAS EAR/PLS OXIMTRY CONT: CPT

## 2020-04-26 PROCEDURE — 94640 AIRWAY INHALATION TREATMENT: CPT

## 2020-04-26 PROCEDURE — 77010033678 HC OXYGEN DAILY

## 2020-04-26 PROCEDURE — 96374 THER/PROPH/DIAG INJ IV PUSH: CPT

## 2020-04-26 PROCEDURE — 96365 THER/PROPH/DIAG IV INF INIT: CPT

## 2020-04-26 PROCEDURE — 96366 THER/PROPH/DIAG IV INF ADDON: CPT

## 2020-04-26 PROCEDURE — 96376 TX/PRO/DX INJ SAME DRUG ADON: CPT

## 2020-04-26 RX ORDER — HEPARIN SODIUM 1000 [USP'U]/ML
4000 INJECTION, SOLUTION INTRAVENOUS; SUBCUTANEOUS ONCE
Status: COMPLETED | OUTPATIENT
Start: 2020-04-26 | End: 2020-04-27

## 2020-04-26 RX ORDER — HEPARIN SODIUM 10000 [USP'U]/100ML
12-25 INJECTION, SOLUTION INTRAVENOUS
Status: DISCONTINUED | OUTPATIENT
Start: 2020-04-26 | End: 2020-04-27

## 2020-04-26 RX ORDER — IPRATROPIUM BROMIDE AND ALBUTEROL SULFATE 2.5; .5 MG/3ML; MG/3ML
9 SOLUTION RESPIRATORY (INHALATION) ONCE
Status: DISCONTINUED | OUTPATIENT
Start: 2020-04-26 | End: 2020-04-26 | Stop reason: HOSPADM

## 2020-04-26 RX ADMIN — METHYLPREDNISOLONE SODIUM SUCCINATE 125 MG: 125 INJECTION, POWDER, FOR SOLUTION INTRAMUSCULAR; INTRAVENOUS at 07:01

## 2020-04-26 NOTE — ED PROVIDER NOTES
EMERGENCY DEPARTMENT HISTORY AND PHYSICAL EXAM    8:56 AM  Date: 4/26/2020  Patient Name: Tabatha Marquis    History of Presenting Illness     Chief Complaint   Patient presents with    Shortness of Breath       History Provided By: patient     HPI: aTbatha Marquis is a 53 y.o. female with past medical history as below including COPD (on home oxygen PRN) presents with shortness of breath after running out of her oxygen. She was saturating at 90% on room air when found by EMS. When she was put on oxygen she felt well. Patient does not wish to get any duo nebs or steroids here. Wishes to go home. She was tested negative for corona virus at the beginning of April. No cough, fever or chills. PCP: Esmer Jean NP    Past History     Past Medical History:  Past Medical History:   Diagnosis Date    Asthma     CHF (congestive heart failure) (HCC)     Chronic obstructive pulmonary disease (HCC)     Dependence on supplemental oxygen     Endocrine disease     thyroid issues    Gastrointestinal disorder     \"blockage in my stomach\"    Hypercholesterolemia     Hypertension        Past Surgical History:  Past Surgical History:   Procedure Laterality Date    HX GI      Exploratory laparotomy with lysis of adhesions and primary repair of incarcerated umbilical hernia       Family History:  No family history on file. Social History:  Social History     Tobacco Use    Smoking status: Current Every Day Smoker     Packs/day: 0.25    Smokeless tobacco: Former User   Substance Use Topics    Alcohol use: No     Comment: socially    Drug use: Not Currently     Types: Heroin       Allergies:  No Known Allergies    Review of Systems   Review of Systems   Constitutional: Negative for activity change, appetite change and chills. HENT: Negative for congestion, ear discharge, ear pain and sore throat. Eyes: Negative for photophobia and pain. Respiratory: Positive for shortness of breath.  Negative for cough and choking. Cardiovascular: Negative for palpitations and leg swelling. Gastrointestinal: Negative for anal bleeding and rectal pain. Endocrine: Negative for polydipsia and polyuria. Genitourinary: Negative for genital sores and urgency. Musculoskeletal: Negative for arthralgias and myalgias. Neurological: Negative for dizziness, seizures and speech difficulty. Psychiatric/Behavioral: Negative for hallucinations, self-injury and suicidal ideas. Physical Exam     No data found. Physical Exam  Vitals signs and nursing note reviewed. Constitutional:       Appearance: She is well-developed. HENT:      Head: Normocephalic and atraumatic. Eyes:      General:         Right eye: No discharge. Left eye: No discharge. Neck:      Musculoskeletal: Normal range of motion and neck supple. Cardiovascular:      Rate and Rhythm: Normal rate and regular rhythm. Heart sounds: Normal heart sounds. No murmur. Pulmonary:      Effort: Pulmonary effort is normal. No respiratory distress. Breath sounds: No stridor. Wheezing present. No rales. Chest:      Chest wall: No tenderness. Abdominal:      General: Bowel sounds are normal. There is no distension. Palpations: Abdomen is soft. Tenderness: There is no abdominal tenderness. There is no guarding or rebound. Musculoskeletal: Normal range of motion. Skin:     General: Skin is warm and dry. Neurological:      Mental Status: She is alert and oriented to person, place, and time. Diagnostic Study Results     Labs -  No results found for this or any previous visit (from the past 12 hour(s)). Radiologic Studies -   Xr Chest Port    Result Date: 4/27/2020  IMPRESSION: Negative chest.         Medical Decision Making     ED Course: Progress Notes, Reevaluation, and Consults:    8:56 AM Initial assessment performed.  The patients presenting problems have been discussed, and they/their family are in agreement with the care plan formulated and outlined with them. I have encouraged them to ask questions as they arise throughout their visit. Provider Notes (Medical Decision Making):  Patient presents with some mild SOB   She can speak in full sentences  Only minimal wheeze  Not tachypneic or hypoxic  No leukocytosis in labs. Patient does not wish to receive DuoNebs or steroids  She will contact her company for replacement oxygen. Advised to return to the ED if unable to obtain oxygen. X-ray no evidence of pneumonia  Patient will be discharged with PMD follow-up    Vital Signs-Reviewed the patient's vital signs. Reviewed pt's pulse ox reading. Records Reviewed:  old medical records (Time of Review: 8:56 AM)  -I am the first provider for this patient.  -I reviewed the vital signs, available nursing notes, past medical history, past surgical history, family history and social history. Current Outpatient Medications   Medication Sig Dispense Refill    albuterol (ACCUNEB) 1.25 mg/3 mL nebu Take 3 mL by inhalation every four (4) hours as needed for Wheezing (wheezing). 25 Each 0    albuterol (PROVENTIL HFA, VENTOLIN HFA, PROAIR HFA) 90 mcg/actuation inhaler Take 2 Puffs by inhalation every four (4) hours as needed for Wheezing or Shortness of Breath. Indications: asthma attack, chronic obstructive pulmonary disease 1 Inhaler 3    cloNIDine HCL (CATAPRES) 0.2 mg tablet Take 1 Tab by mouth two (2) times a day. 60 Tab 1    aspirin delayed-release 81 mg tablet Take 1 Tab by mouth daily. 30 Tab 0    ondansetron (ZOFRAN ODT) 4 mg disintegrating tablet Take 1 Tab by mouth every eight (8) hours as needed for Nausea. Indications: prevent nausea and vomiting after surgery 20 Tab 0    famotidine (PEPCID) 20 mg tablet Take 1 Tab by mouth daily. 30 Tab 1    OXYGEN-AIR DELIVERY SYSTEMS 3 L by IntraNASal route as needed for Other (sob).  docusate sodium (COLACE) 100 mg capsule Take 1 Cap by mouth two (2) times a day. 120 Cap 0    fluticasone furoate-vilanterol (BREO ELLIPTA) 200-25 mcg/dose inhaler Take 1 Puff by inhalation daily.  amLODIPine (NORVASC) 10 mg tablet Take 1 Tab by mouth daily. 30 Tab 0    arformoterol (BROVANA) 15 mcg/2 mL nebu neb solution 2 mL by Nebulization route two (2) times a day. 60 Vial 0    budesonide (PULMICORT) 0.5 mg/2 mL nbsp 2 mL by Nebulization route two (2) times a day. 60 Each 0    tiotropium (SPIRIVA) 18 mcg inhalation capsule Take 1 Cap by inhalation daily. 30 Cap 0    atorvastatin (LIPITOR) 20 mg tablet Take 1 Tab by mouth nightly. 30 Tab 0    escitalopram oxalate (LEXAPRO) 10 mg tablet Take 1 Tab by mouth every evening. 30 Tab 0    metoprolol tartrate (LOPRESSOR) 25 mg tablet Take 0.5 Tabs by mouth every twelve (12) hours. 30 Tab 0    roflumilast (DALIRESP) 500 mcg tab tablet Take 1 Tab by mouth daily. 30 Tab 0    OTHER Incentive spirometry- use as directed 1 Each 0    naloxone (NARCAN) 4 mg/actuation nasal spray Use 1 spray intranasally, then discard. Repeat with new spray every 2 min as needed for opioid overdose symptoms, alternating nostrils. 1 Each 0    montelukast (SINGULAIR) 10 mg tablet Take 1 Tab by mouth nightly. 30 Tab 1        Clinical Impression     Clinical Impression: COPD exacerbation    Disposition:  discharge      DISCHARGE NOTE:   They verbally convey understanding and agreement of the signs, symptoms, diagnosis, treatment and prognosis and additionally agree to follow up as discussed. All questions were answered, and we reviewed pertinent return precautions as seen in the discharge paperwork. Pt understood follow up instructions, and would return to the ED if any worsening or at all concerned. This note was dictated utilizing voice recognition software which may lead to typographical errors. I apologize in advance if the situation occurs. If questions arise please do not hesitate to contact me or call our department.     Dae Colon MD  8:56 AM

## 2020-04-26 NOTE — ED NOTES
Pt stated she was being discharged, her ride is outside, and did not have time to wait for discharge paperwork. Reviewed discharge policy and provider discharge policy. Pt verbalized understanding and did not receive discharge instructions/paperwork.   Patient ambulated to ED waiting room and out the building

## 2020-04-26 NOTE — ED TRIAGE NOTES
Patient presents to the ED via EMS (PM1) from home for evaluation of shortness of breath. Per medic, \"Patient has a history of COPD and is on 4LNC at home. She has been out of her home oxygen for about 4 days. When we arrived on scene she was at 90% on room air. When placed her on a non-rebreather and her oxygen saturation immediately  went to 100% and she stated she was not having anymore shortness of breath. She also mentioned that there was an attachment that goes on the top of her oxygen tank that she does not know how to work. We informed her that she can call 911 for a service call and we will come out and help her with her oxygen tank. \"

## 2020-04-26 NOTE — ED NOTES
Patient refused breathing treatment because she does not want her door closed. She then said, \"I am fine now, I just needed my oxygen. I can go home. \"

## 2020-04-26 NOTE — ED NOTES
Bedside and verbal shift report given by Levar Burciaga RN (off going nurse) to ROSALINE Joshi (oncoming nurse). Report included the following information SBAR and ED Summary.

## 2020-04-27 ENCOUNTER — APPOINTMENT (OUTPATIENT)
Dept: GENERAL RADIOLOGY | Age: 55
End: 2020-04-27
Attending: EMERGENCY MEDICINE
Payer: MEDICAID

## 2020-04-27 ENCOUNTER — PATIENT OUTREACH (OUTPATIENT)
Dept: CASE MANAGEMENT | Age: 55
End: 2020-04-27

## 2020-04-27 VITALS
DIASTOLIC BLOOD PRESSURE: 97 MMHG | SYSTOLIC BLOOD PRESSURE: 161 MMHG | TEMPERATURE: 98 F | HEART RATE: 58 BPM | RESPIRATION RATE: 17 BRPM | OXYGEN SATURATION: 100 %

## 2020-04-27 PROBLEM — I20.0 UNSTABLE ANGINA (HCC): Status: ACTIVE | Noted: 2020-04-27

## 2020-04-27 LAB
ALBUMIN SERPL-MCNC: 3.5 G/DL (ref 3.4–5)
ALBUMIN/GLOB SERPL: 1 {RATIO} (ref 0.8–1.7)
ALP SERPL-CCNC: 110 U/L (ref 45–117)
ALT SERPL-CCNC: 14 U/L (ref 13–56)
ANION GAP SERPL CALC-SCNC: 8 MMOL/L (ref 3–18)
APTT PPP: 27.9 SEC (ref 23–36.4)
AST SERPL-CCNC: 9 U/L (ref 10–38)
BASOPHILS # BLD: 0 K/UL (ref 0–0.1)
BASOPHILS NFR BLD: 0 % (ref 0–2)
BILIRUB SERPL-MCNC: 0.3 MG/DL (ref 0.2–1)
BUN SERPL-MCNC: 19 MG/DL (ref 7–18)
BUN/CREAT SERPL: 17 (ref 12–20)
CALCIUM SERPL-MCNC: 8.7 MG/DL (ref 8.5–10.1)
CHLORIDE SERPL-SCNC: 108 MMOL/L (ref 100–111)
CO2 SERPL-SCNC: 27 MMOL/L (ref 21–32)
CREAT SERPL-MCNC: 1.11 MG/DL (ref 0.6–1.3)
DIFFERENTIAL METHOD BLD: ABNORMAL
EOSINOPHIL # BLD: 0 K/UL (ref 0–0.4)
EOSINOPHIL NFR BLD: 0 % (ref 0–5)
ERYTHROCYTE [DISTWIDTH] IN BLOOD BY AUTOMATED COUNT: 14.8 % (ref 11.6–14.5)
GLOBULIN SER CALC-MCNC: 3.5 G/DL (ref 2–4)
GLUCOSE SERPL-MCNC: 121 MG/DL (ref 74–99)
HCT VFR BLD AUTO: 36.4 % (ref 35–45)
HGB BLD-MCNC: 12.1 G/DL (ref 12–16)
INR PPP: 1.1 (ref 0.8–1.2)
LYMPHOCYTES # BLD: 2.1 K/UL (ref 0.9–3.6)
LYMPHOCYTES NFR BLD: 24 % (ref 21–52)
MCH RBC QN AUTO: 28.7 PG (ref 24–34)
MCHC RBC AUTO-ENTMCNC: 33.2 G/DL (ref 31–37)
MCV RBC AUTO: 86.5 FL (ref 74–97)
MONOCYTES # BLD: 0.5 K/UL (ref 0.05–1.2)
MONOCYTES NFR BLD: 5 % (ref 3–10)
NEUTS SEG # BLD: 6.2 K/UL (ref 1.8–8)
NEUTS SEG NFR BLD: 71 % (ref 40–73)
PLATELET # BLD AUTO: 259 K/UL (ref 135–420)
PMV BLD AUTO: 11.7 FL (ref 9.2–11.8)
POTASSIUM SERPL-SCNC: 4.1 MMOL/L (ref 3.5–5.5)
PROT SERPL-MCNC: 7 G/DL (ref 6.4–8.2)
PROTHROMBIN TIME: 14.1 SEC (ref 11.5–15.2)
RBC # BLD AUTO: 4.21 M/UL (ref 4.2–5.3)
SODIUM SERPL-SCNC: 143 MMOL/L (ref 136–145)
TROPONIN I SERPL-MCNC: <0.02 NG/ML (ref 0–0.04)
WBC # BLD AUTO: 8.8 K/UL (ref 4.6–13.2)

## 2020-04-27 PROCEDURE — 85610 PROTHROMBIN TIME: CPT

## 2020-04-27 PROCEDURE — 71045 X-RAY EXAM CHEST 1 VIEW: CPT

## 2020-04-27 PROCEDURE — 65270000029 HC RM PRIVATE

## 2020-04-27 PROCEDURE — 85730 THROMBOPLASTIN TIME PARTIAL: CPT

## 2020-04-27 PROCEDURE — 74011250636 HC RX REV CODE- 250/636: Performed by: EMERGENCY MEDICINE

## 2020-04-27 PROCEDURE — 85025 COMPLETE CBC W/AUTO DIFF WBC: CPT

## 2020-04-27 PROCEDURE — 84484 ASSAY OF TROPONIN QUANT: CPT

## 2020-04-27 PROCEDURE — 99218 HC RM OBSERVATION: CPT

## 2020-04-27 PROCEDURE — 74011000250 HC RX REV CODE- 250: Performed by: EMERGENCY MEDICINE

## 2020-04-27 PROCEDURE — 74011000250 HC RX REV CODE- 250: Performed by: INTERNAL MEDICINE

## 2020-04-27 PROCEDURE — 80053 COMPREHEN METABOLIC PANEL: CPT

## 2020-04-27 PROCEDURE — 74011250637 HC RX REV CODE- 250/637: Performed by: INTERNAL MEDICINE

## 2020-04-27 RX ORDER — NITROGLYCERIN 0.4 MG/1
0.4 TABLET SUBLINGUAL AS NEEDED
Status: CANCELLED | OUTPATIENT
Start: 2020-04-27

## 2020-04-27 RX ORDER — IPRATROPIUM BROMIDE AND ALBUTEROL SULFATE 2.5; .5 MG/3ML; MG/3ML
3 SOLUTION RESPIRATORY (INHALATION) ONCE
Status: COMPLETED | OUTPATIENT
Start: 2020-04-27 | End: 2020-04-27

## 2020-04-27 RX ORDER — ALBUTEROL SULFATE 1.25 MG/3ML
1.25 SOLUTION RESPIRATORY (INHALATION)
Qty: 25 EACH | Refills: 0 | Status: SHIPPED | OUTPATIENT
Start: 2020-04-27 | End: 2020-05-30

## 2020-04-27 RX ORDER — BUDESONIDE 0.5 MG/2ML
500 INHALANT ORAL 2 TIMES DAILY
Status: DISCONTINUED | OUTPATIENT
Start: 2020-04-27 | End: 2020-04-27 | Stop reason: HOSPADM

## 2020-04-27 RX ORDER — ESCITALOPRAM OXALATE 10 MG/1
10 TABLET ORAL EVERY EVENING
Status: DISCONTINUED | OUTPATIENT
Start: 2020-04-27 | End: 2020-04-27 | Stop reason: HOSPADM

## 2020-04-27 RX ORDER — MONTELUKAST SODIUM 10 MG/1
10 TABLET ORAL
Status: DISCONTINUED | OUTPATIENT
Start: 2020-04-27 | End: 2020-04-27 | Stop reason: HOSPADM

## 2020-04-27 RX ORDER — METOPROLOL TARTRATE 25 MG/1
12.5 TABLET, FILM COATED ORAL EVERY 12 HOURS
Status: DISCONTINUED | OUTPATIENT
Start: 2020-04-27 | End: 2020-04-27 | Stop reason: HOSPADM

## 2020-04-27 RX ORDER — AMLODIPINE BESYLATE 10 MG/1
10 TABLET ORAL DAILY
Status: DISCONTINUED | OUTPATIENT
Start: 2020-04-27 | End: 2020-04-27 | Stop reason: HOSPADM

## 2020-04-27 RX ORDER — KETOROLAC TROMETHAMINE 15 MG/ML
15 INJECTION, SOLUTION INTRAMUSCULAR; INTRAVENOUS
Status: DISCONTINUED | OUTPATIENT
Start: 2020-04-27 | End: 2020-04-27 | Stop reason: HOSPADM

## 2020-04-27 RX ORDER — ONDANSETRON 4 MG/1
4 TABLET, ORALLY DISINTEGRATING ORAL
Status: DISCONTINUED | OUTPATIENT
Start: 2020-04-27 | End: 2020-04-27 | Stop reason: HOSPADM

## 2020-04-27 RX ORDER — ENOXAPARIN SODIUM 100 MG/ML
40 INJECTION SUBCUTANEOUS EVERY 24 HOURS
Status: CANCELLED | OUTPATIENT
Start: 2020-04-27

## 2020-04-27 RX ORDER — ARFORMOTEROL TARTRATE 15 UG/2ML
15 SOLUTION RESPIRATORY (INHALATION) 2 TIMES DAILY
Status: DISCONTINUED | OUTPATIENT
Start: 2020-04-27 | End: 2020-04-27 | Stop reason: HOSPADM

## 2020-04-27 RX ORDER — ALBUTEROL SULFATE 90 UG/1
2 AEROSOL, METERED RESPIRATORY (INHALATION)
Status: DISCONTINUED | OUTPATIENT
Start: 2020-04-27 | End: 2020-04-27 | Stop reason: HOSPADM

## 2020-04-27 RX ORDER — ATORVASTATIN CALCIUM 20 MG/1
20 TABLET, FILM COATED ORAL
Status: DISCONTINUED | OUTPATIENT
Start: 2020-04-27 | End: 2020-04-27 | Stop reason: HOSPADM

## 2020-04-27 RX ORDER — FAMOTIDINE 20 MG/1
20 TABLET, FILM COATED ORAL DAILY
Status: DISCONTINUED | OUTPATIENT
Start: 2020-04-27 | End: 2020-04-27 | Stop reason: HOSPADM

## 2020-04-27 RX ORDER — CLONIDINE HYDROCHLORIDE 0.2 MG/1
0.2 TABLET ORAL 2 TIMES DAILY
Qty: 60 TAB | Refills: 1 | Status: SHIPPED | OUTPATIENT
Start: 2020-04-27 | End: 2020-06-22

## 2020-04-27 RX ORDER — ASPIRIN 81 MG/1
81 TABLET ORAL DAILY
Status: DISCONTINUED | OUTPATIENT
Start: 2020-04-27 | End: 2020-04-27 | Stop reason: HOSPADM

## 2020-04-27 RX ORDER — ASPIRIN 325 MG
325 TABLET ORAL DAILY
Status: CANCELLED | OUTPATIENT
Start: 2020-04-27

## 2020-04-27 RX ORDER — IPRATROPIUM BROMIDE 0.5 MG/2.5ML
0.5 SOLUTION RESPIRATORY (INHALATION)
Status: DISCONTINUED | OUTPATIENT
Start: 2020-04-27 | End: 2020-04-27 | Stop reason: HOSPADM

## 2020-04-27 RX ORDER — ALBUTEROL SULFATE 90 UG/1
2 AEROSOL, METERED RESPIRATORY (INHALATION)
Qty: 1 INHALER | Refills: 3 | Status: SHIPPED | OUTPATIENT
Start: 2020-04-27 | End: 2020-05-30

## 2020-04-27 RX ORDER — DOCUSATE SODIUM 100 MG/1
100 CAPSULE, LIQUID FILLED ORAL 2 TIMES DAILY
Status: DISCONTINUED | OUTPATIENT
Start: 2020-04-27 | End: 2020-04-27 | Stop reason: HOSPADM

## 2020-04-27 RX ORDER — MORPHINE SULFATE 4 MG/ML
4 INJECTION, SOLUTION INTRAMUSCULAR; INTRAVENOUS
Status: COMPLETED | OUTPATIENT
Start: 2020-04-27 | End: 2020-04-27

## 2020-04-27 RX ORDER — CLONIDINE HYDROCHLORIDE 0.1 MG/1
0.2 TABLET ORAL 2 TIMES DAILY
Status: DISCONTINUED | OUTPATIENT
Start: 2020-04-27 | End: 2020-04-27 | Stop reason: HOSPADM

## 2020-04-27 RX ORDER — ALBUTEROL SULFATE 1.25 MG/3ML
1.25 SOLUTION RESPIRATORY (INHALATION)
Status: DISCONTINUED | OUTPATIENT
Start: 2020-04-27 | End: 2020-04-27 | Stop reason: HOSPADM

## 2020-04-27 RX ADMIN — ARFORMOTEROL TARTRATE 15 MCG: 15 SOLUTION RESPIRATORY (INHALATION) at 08:22

## 2020-04-27 RX ADMIN — FAMOTIDINE 20 MG: 20 TABLET ORAL at 08:16

## 2020-04-27 RX ADMIN — IPRATROPIUM BROMIDE AND ALBUTEROL SULFATE 3 ML: .5; 3 SOLUTION RESPIRATORY (INHALATION) at 05:15

## 2020-04-27 RX ADMIN — DOCUSATE SODIUM 100 MG: 100 CAPSULE, LIQUID FILLED ORAL at 08:15

## 2020-04-27 RX ADMIN — HEPARIN SODIUM 12 UNITS/KG/HR: 10000 INJECTION, SOLUTION INTRAVENOUS at 02:46

## 2020-04-27 RX ADMIN — MORPHINE SULFATE 4 MG: 4 INJECTION, SOLUTION INTRAMUSCULAR; INTRAVENOUS at 02:01

## 2020-04-27 RX ADMIN — CLONIDINE HYDROCHLORIDE 0.2 MG: 0.1 TABLET ORAL at 08:16

## 2020-04-27 RX ADMIN — AMLODIPINE BESYLATE 10 MG: 10 TABLET ORAL at 08:15

## 2020-04-27 RX ADMIN — HEPARIN SODIUM 4000 UNITS: 1000 INJECTION, SOLUTION INTRAVENOUS; SUBCUTANEOUS at 02:44

## 2020-04-27 RX ADMIN — BUDESONIDE 500 MCG: 0.5 INHALANT RESPIRATORY (INHALATION) at 08:22

## 2020-04-27 RX ADMIN — METOPROLOL TARTRATE 12.5 MG: 25 TABLET, FILM COATED ORAL at 08:17

## 2020-04-27 RX ADMIN — ASPIRIN 81 MG: 81 TABLET, COATED ORAL at 09:00

## 2020-04-27 NOTE — PROGRESS NOTES
CM went in to see the patient, to complete initial assessment, patient was talking on the phone. Patient asked CM to come back later, so she could finish her phone call.      Pamella Grullon RN  Case Management [phone]

## 2020-04-27 NOTE — DISCHARGE SUMMARY
[facility]  Discharge Summary       Patient: Kelsie Tanner Age: 53 y.o. : 1965 MR#: [id number] [id number]: [id number]  PCP on record: Jessica Stroud NP  Admit date: 2020  Discharge date: 2020    Consults: -ALVIN Milligan,-cardiology  -   Procedures: none  -     Significant Diagnostic Studies: -CXR 20: IMPRESSION:     Negative chest.  -    Discharge Diagnoses:  -Chest pain ACS ruled out                                         Patient Active Problem List   Diagnosis Code    Asthma exacerbation attacks J45. 0    Status asthmaticus with COPD (chronic obstructive pulmonary disease) ([facility]) J44.9, J45.902    Abnormal CT of the chest R93.89    Asthma J45.909    Acute on chronic respiratory failure with hypoxemia ([facility]) J96.21    Acute exacerbation of chronic obstructive pulmonary disease (COPD) ([facility]) J44.1    Respiratory failure ([facility]) J96.90    Acute encephalopathy G93.40    Asthma exacerbation J45. 901    Acute respiratory failure with hypercapnia ([facility]) J96.02    Acute exacerbation of COPD with asthma ([facility] [address] J44.1, J45.901    Essential hypertension I10    Morbid obesity due to excess calories ([facility]) E66.01    Acute asthma exacerbation J45. 0    Hypertensive heart failure ([facility]) I11.0    Sleep apnea G47.30    COPD (chronic obstructive pulmonary disease) with chronic bronchitis ([facility]) J44.9    Acute bronchitis with chronic obstructive pulmonary disease (COPD) ([facility]) J44.0, J20.9    T wave inversion in EKG R94.31    COPD (chronic obstructive pulmonary disease) ([facility]) J44.9    Hypertensive urgency I16.0    Tobacco use Z72.0    Incarcerated incisional hernia K43.0    Bilious vomiting R11.14    Ileus ([facility]) K56.7    Abdominal pain R10.9    S/P hernia repair Z98.890, Z87.19    Partial small bowel obstruction ([facility]) K56.600    CKD (chronic kidney disease) stage 3, GFR 30-59 ml/min ([facility]) N18.3    Cocaine abuse ([facility] [address] F14.10    Drug-seeking behavior Z76.5    DVT prophylaxis Z29.9    Fibromyalgia M79.7    Anasarca R60.1    Abdominal hernia without obstruction or gangrene K46.9    Depression F32.9    Fe deficiency anemia D50.9    Gastroesophageal reflux disease without esophagitis K21.9    HLD (hyperlipidemia) E78.5    Thyroid goiter E04.9    MDRO (multiple drug resistant organisms) resistance Z16.35    On home O2 Z99.81    Polysubstance abuse ([facility]) N02.75    Uncomplicated severe persistent asthma J45.50    Vocal cord disease J38.3    ACS (acute coronary syndrome) ([facility]) I24.9    NSTEMI (non-ST elevated myocardial infarction) ([facility]) I21.4    Hypokalemia E87.6    CHF (congestive heart failure) ([facility]) I50.9    SBO (small bowel obstruction) ([facility]) K56.609    Cough R05    Dyspnea R06.00    COPD exacerbation ([facility]) J44.1    History of chronic CHF Z86.79    Generalized body aches R52    Suspected Covid-19 Virus Infection R68.89    Pneumonia J18.9    Pericarditis, viral B33.23    CAP (community acquired pneumonia) J18.9    Myocarditis ([facility]) I51.4    Unstable angina ([facility]) I20.0       Hospital Course by Problem     53 y o female known to the service for multiple presentations and evaluations for cp and sob and generalized pain, w/ 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 Troponin was wnl, no EKG changes to support ACS. She was evaluated by the cardiology service. She had undergone coronary angiography on 3/26/20 and that study did not reveal significant CAD. No further cardiac evaluation was needed. Pt during her stay continued to ask for pain medications and when offered non narcotic medications she reported that those medications did not work. She remained stable otherwise and has been d/c'd. She requested for refills on her clonidine, albuterol neb and inhaler as well as home 02. Case management was contacted to assist w/ her 02 needs and she is to be discharged after this is addressed.        Today's examination of the patient revealed:     Subjective:     Objective:   VS:   Visit Vitals  BP (!) 161/97   Pulse (!) 58   Temp 98 °F (36.7 °C)   Resp 17   LMP 2009   SpO2 100%      Tmax/24hrs: Temp (24hrs), Av °F (36.7 °C), Min:98 °F (36.7 °C), Max:98 °F (36.7 °C)     Input/Output: No intake or output data in the 24 hours ending 20 1209    General:  Alert, awake, in nad  Cardiovascular:  Rrr, no murmurs  Pulmonary:  ctab  GI: soft, nt, nd  Extremities:  No edema  Additional:      Labs:    Recent Results (from the past 24 hour(s))   EKG, 12 LEAD, INITIAL    Collection Time: 20 11:50 PM   Result Value Ref Range    Ventricular Rate 68 BPM    Atrial Rate 68 BPM    P-R Interval 156 ms    QRS Duration 92 ms    Q-T Interval 460 ms    QTC Calculation (Bezet) 489 ms    Calculated P Axis 80 degrees    Calculated R Axis 56 degrees    Calculated T Axis 117 degrees    Diagnosis       Normal sinus rhythm  T wave abnormality, consider anterolateral ischemia  Prolonged QT  Abnormal ECG  When compared with ECG of 2020 06:29,  Left bundle branch block is no longer present     CBC WITH AUTOMATED DIFF    Collection Time: 20  1:30 AM   Result Value Ref Range    WBC 8.8 4.6 - 13.2 K/uL    RBC 4.21 4.20 - 5.30 M/uL    HGB 12.1 12.0 - 16.0 g/dL    HCT 36.4 35.0 - 45.0 %    MCV 86.5 74.0 - 97.0 FL    MCH 28.7 24.0 - 34.0 PG    MCHC 33.2 31.0 - 37.0 g/dL    RDW 14.8 (H) 11.6 - 14.5 %    PLATELET 322 503 - 646 K/uL    MPV 11.7 9.2 - 11.8 FL    NEUTROPHILS 71 40 - 73 %    LYMPHOCYTES 24 21 - 52 %    MONOCYTES 5 3 - 10 %    EOSINOPHILS 0 0 - 5 %    BASOPHILS 0 0 - 2 %    ABS. NEUTROPHILS 6.2 1.8 - 8.0 K/UL    ABS. LYMPHOCYTES 2.1 0.9 - 3.6 K/UL    ABS. MONOCYTES 0.5 0.05 - 1.2 K/UL    ABS. EOSINOPHILS 0.0 0.0 - 0.4 K/UL    ABS.  BASOPHILS 0.0 0.0 - 0.1 K/UL    DF AUTOMATED     TROPONIN I    Collection Time: 20  1:30 AM   Result Value Ref Range    Troponin-I, QT <0.02 0.0 - 8.857 NG/ML   METABOLIC PANEL, COMPREHENSIVE    Collection Time: 04/27/20  1:30 AM   Result Value Ref Range    Sodium 143 136 - 145 mmol/L    Potassium 4.1 3.5 - 5.5 mmol/L    Chloride 108 100 - 111 mmol/L    CO2 27 21 - 32 mmol/L    Anion gap 8 3.0 - 18 mmol/L    Glucose 121 (H) 74 - 99 mg/dL    BUN 19 (H) 7.0 - 18 MG/DL    Creatinine 1.11 0.6 - 1.3 MG/DL    BUN/Creatinine ratio 17 12 - 20      GFR est AA >60 >60 ml/min/1.73m2    GFR est non-AA 51 (L) >60 ml/min/1.73m2    Calcium 8.7 8.5 - 10.1 MG/DL    Bilirubin, total 0.3 0.2 - 1.0 MG/DL    ALT (SGPT) 14 13 - 56 U/L    AST (SGOT) 9 (L) 10 - 38 U/L    Alk. phosphatase 110 45 - 117 U/L    Protein, total 7.0 6.4 - 8.2 g/dL    Albumin 3.5 3.4 - 5.0 g/dL    Globulin 3.5 2.0 - 4.0 g/dL    A-G Ratio 1.0 0.8 - 1.7     PTT    Collection Time: 04/27/20  1:30 AM   Result Value Ref Range    aPTT 27.9 23.0 - 36.4 SEC   PROTHROMBIN TIME + INR    Collection Time: 04/27/20  1:30 AM   Result Value Ref Range    Prothrombin time 14.1 11.5 - 15.2 sec    INR 1.1 0.8 - 1.2       Additional Data Reviewed:     Condition on discharge: stable  Disposition:    [x]Home   []Home with Home Health   []SNF/NH   []Rehab   []Home with family   []Alternate Facility:____________________      Discharge Medications:     Current Discharge Medication List      CONTINUE these medications which have CHANGED    Details   albuterol (ACCUNEB) 1.25 mg/3 mL nebu Take 3 mL by inhalation every four (4) hours as needed for Wheezing (wheezing). Qty: 25 Each, Refills: 0      albuterol (PROVENTIL HFA, VENTOLIN HFA, PROAIR HFA) 90 mcg/actuation inhaler Take 2 Puffs by inhalation every four (4) hours as needed for Wheezing or Shortness of Breath. Indications: asthma attack, chronic obstructive pulmonary disease  Qty: 1 Inhaler, Refills: 3      cloNIDine HCL (CATAPRES) 0.2 mg tablet Take 1 Tab by mouth two (2) times a day.   Qty: 60 Tab, Refills: 1         CONTINUE these medications which have NOT CHANGED    Details   aspirin delayed-release 81 mg tablet Take 1 Tab by mouth daily. Qty: 30 Tab, Refills: 0      ondansetron (ZOFRAN ODT) 4 mg disintegrating tablet Take 1 Tab by mouth every eight (8) hours as needed for Nausea. Indications: prevent nausea and vomiting after surgery  Qty: 20 Tab, Refills: 0      famotidine (PEPCID) 20 mg tablet Take 1 Tab by mouth daily. Qty: 30 Tab, Refills: 1      OXYGEN-AIR DELIVERY SYSTEMS 3 L by IntraNASal route as needed for Other (sob). docusate sodium (COLACE) 100 mg capsule Take 1 Cap by mouth two (2) times a day. Qty: 120 Cap, Refills: 0      fluticasone furoate-vilanterol (BREO ELLIPTA) 200-25 mcg/dose inhaler Take 1 Puff by inhalation daily. amLODIPine (NORVASC) 10 mg tablet Take 1 Tab by mouth daily. Qty: 30 Tab, Refills: 0      arformoterol (BROVANA) 15 mcg/2 mL nebu neb solution 2 mL by Nebulization route two (2) times a day. Qty: 60 Vial, Refills: 0      budesonide (PULMICORT) 0.5 mg/2 mL nbsp 2 mL by Nebulization route two (2) times a day. Qty: 60 Each, Refills: 0      tiotropium (SPIRIVA) 18 mcg inhalation capsule Take 1 Cap by inhalation daily. Qty: 30 Cap, Refills: 0      atorvastatin (LIPITOR) 20 mg tablet Take 1 Tab by mouth nightly. Qty: 30 Tab, Refills: 0      escitalopram oxalate (LEXAPRO) 10 mg tablet Take 1 Tab by mouth every evening. Qty: 30 Tab, Refills: 0      metoprolol tartrate (LOPRESSOR) 25 mg tablet Take 0.5 Tabs by mouth every twelve (12) hours. Qty: 30 Tab, Refills: 0      roflumilast (DALIRESP) 500 mcg tab tablet Take 1 Tab by mouth daily. Qty: 30 Tab, Refills: 0      OTHER Incentive spirometry- use as directed  Qty: 1 Each, Refills: 0      naloxone (NARCAN) 4 mg/actuation nasal spray Use 1 spray intranasally, then discard. Repeat with new spray every 2 min as needed for opioid overdose symptoms, alternating nostrils. Qty: 1 Each, Refills: 0      montelukast (SINGULAIR) 10 mg tablet Take 1 Tab by mouth nightly.   Qty: 30 Tab, Refills: 1               Follow-up Appointments: 1. Your PCP: Manish Ma NP, within 7-10days      >30 minutes spent coordinating this discharge (review instructions/follow-up, prescriptions, preparing report for sign off)    Signed:  Kike James MD  4/27/2020  12:09 PM

## 2020-04-27 NOTE — ED NOTES
Pt sitting in bed talking with family on the phone. No distress noted at this time. Warm blanket and coffee per patient request provided.

## 2020-04-27 NOTE — PROGRESS NOTES
CM spoke to patient in the hallway as she was leaving the ER. CM gave her a sheet of paper with [address] with phone number [phone]. CM instructed patient to call [facility] as soon as possible to look at her concentrator and portable to fix any issues, to avoid any respiratory emergencies. Patient stated, \"I will\". Patient has a ride home, no other CM needs at this time.     Sherren Rily, RN  Case Management [phone]

## 2020-04-27 NOTE — ED NOTES
Patient provided DC instructions with RX for home meds. Patient also given number by Aby Ocasio for patient to get more oxygen supplies at home. Pt verbalized understanding of instructions but refused DC vital signs. Patient ambulatory to waiting area to await ride home.

## 2020-04-27 NOTE — H&P
Admission History and Physical    Tabatha Marquis is a 53 y.o. female who is being admitted. Chief Complaint   Patient presents with    Chest Pain       Impression/Plan     53years old presented with complaint chest pain or shortness of breath    -Chest pain and shortness of breath  Low suspicion for acute coronary syndrome given that she had clean coronaries on cath on 3/26/20  We will admit for serial cardiac enzymes  Continue aspirin  Cardiology was consulted by ED  On IV heparin at the present time and consider to DC when cardiac enzymes trended. -? NSTEMI 3/26 per records, peak troponin was 0.2  -CHF, not in acute exacerbation  Variations in EF reading in 2 echo's and 1 cardiac cath were noted as is in HPI  Cardiology will consult in a.m.    -COPD, on home oxygen  -Drug use, including heroine, with the last reported use 8 months ago  Continue home bronchodilators  Continue home oxygen  She reports being on methadone 80 mg daily, which may be verified in a.m.    -History of incarcerated hernia repair  -History of small bowel obstruction  Not presently symptomatic    -Other medical problems as below  Continue home medications and outpatient follow-up    Admission plan was discussed    Lovenox for DVT prophylaxis      HPI:    53years old with medical history significant for COPD/asthma on home oxygen, history of drug use including heroine, presently on methadone per her report, hypertension, history of incarcerated hernia status post surgery and followed by admission for small bowel obstruction, CHF per records although there was differences in EF as reported echo 3/10/20 over 70% with hyperdynamic flow, and at 30 to 40% per echo 3/24/2020 and at 65%. Her cardiac cath on 3/26/2020 which also showed clean coronaries. She presented emergency department with chief complaint of having shortness of chest pain.  She described the pain as pressure to the left side of her chest.  Denies having cough, fever, nausea, vomiting or other symptoms. She was seen in the ED the evening before for complaint of shortness of breath, and saying that she did not report  chest pain then. She reported being out of her home oxygen for few days prior to presentation and was discharged home when she felt better and asked to be discharged. Patient presented with chest pain and suspected NSTEMI on 3/23/20 however signed AMA before work-up was complete. Her peak troponin was at 0.2 then. She has history of noncompliance and had signed AMA multiple times on prior presentations. ED consulted cardiology and started patient on IV heparin. She is being admitted for further management. Past Medical History:   Diagnosis Date    Asthma     CHF (congestive heart failure) (HCC)     Chronic obstructive pulmonary disease (HCC)     Dependence on supplemental oxygen     Endocrine disease     thyroid issues    Gastrointestinal disorder     \"blockage in my stomach\"    Hypercholesterolemia     Hypertension      Past Surgical History:   Procedure Laterality Date    HX GI      Exploratory laparotomy with lysis of adhesions and primary repair of incarcerated umbilical hernia     Social history, history of drug use, denies present use  Family history, hypertension  No Known Allergies    Home Medications:     Current Facility-Administered Medications on File Prior to Encounter   Medication Dose Route Frequency Provider Last Rate Last Dose    [COMPLETED] methylPREDNISolone (PF) (Solu-MEDROL) injection 125 mg  125 mg IntraVENous NOW Luke Ford MD   125 mg at 04/26/20 0701     Current Outpatient Medications on File Prior to Encounter   Medication Sig Dispense Refill    aspirin delayed-release 81 mg tablet Take 1 Tab by mouth daily. 30 Tab 0    ondansetron (ZOFRAN ODT) 4 mg disintegrating tablet Take 1 Tab by mouth every eight (8) hours as needed for Nausea.  Indications: prevent nausea and vomiting after surgery 20 Tab 0    famotidine (PEPCID) 20 mg tablet Take 1 Tab by mouth daily. 30 Tab 1    albuterol (PROVENTIL HFA, VENTOLIN HFA, PROAIR HFA) 90 mcg/actuation inhaler Take 2 Puffs by inhalation every four (4) hours as needed for Wheezing or Shortness of Breath. Indications: asthma attack, chronic obstructive pulmonary disease 1 Inhaler 0    albuterol (ACCUNEB) 1.25 mg/3 mL nebu Take 3 mL by inhalation every four (4) hours as needed for Wheezing (wheezing). 25 Each 0    OXYGEN-AIR DELIVERY SYSTEMS 3 L by IntraNASal route as needed for Other (sob).  docusate sodium (COLACE) 100 mg capsule Take 1 Cap by mouth two (2) times a day. 120 Cap 0    fluticasone furoate-vilanterol (BREO ELLIPTA) 200-25 mcg/dose inhaler Take 1 Puff by inhalation daily.  amLODIPine (NORVASC) 10 mg tablet Take 1 Tab by mouth daily. 30 Tab 0    arformoterol (BROVANA) 15 mcg/2 mL nebu neb solution 2 mL by Nebulization route two (2) times a day. 60 Vial 0    budesonide (PULMICORT) 0.5 mg/2 mL nbsp 2 mL by Nebulization route two (2) times a day. 60 Each 0    tiotropium (SPIRIVA) 18 mcg inhalation capsule Take 1 Cap by inhalation daily. 30 Cap 0    atorvastatin (LIPITOR) 20 mg tablet Take 1 Tab by mouth nightly. 30 Tab 0    cloNIDine HCl (CATAPRES) 0.2 mg tablet Take 1 Tab by mouth two (2) times a day. 60 Tab 0    escitalopram oxalate (LEXAPRO) 10 mg tablet Take 1 Tab by mouth every evening. 30 Tab 0    metoprolol tartrate (LOPRESSOR) 25 mg tablet Take 0.5 Tabs by mouth every twelve (12) hours. 30 Tab 0    roflumilast (DALIRESP) 500 mcg tab tablet Take 1 Tab by mouth daily. 30 Tab 0    OTHER Incentive spirometry- use as directed 1 Each 0    naloxone (NARCAN) 4 mg/actuation nasal spray Use 1 spray intranasally, then discard. Repeat with new spray every 2 min as needed for opioid overdose symptoms, alternating nostrils. 1 Each 0    montelukast (SINGULAIR) 10 mg tablet Take 1 Tab by mouth nightly.  30 Tab 1       Review of Systems:   GEN  no fever or chills, no weakness or malaise   CV  see HPI   PULM  No cough, No wheezing, No hemoptysis  GI  no abd pain, no melena, no nausea, no vomiting     no dysuria, no flank pain   M/S- no myalgias, no joint pain, no back pain, no neck pain   SKIN  no rashes, no bruising   ENDO  no temp intolerance, no polyuria, no polydypsia   NEURO  no focal weakness, no dizziness, no sensory changes, no speech changes   PSYCH  no depression, no anxiety, no hallucinations   HEENT  no headache, no ear pain, no sore throat, no blurry vision, no double vision, no neck pain     Physical Assessment:     Visit Vitals  LMP 04/14/2009   SpO2 100%     Constitutional: The patient is oriented to person, place, and time. No distress. Eyes: Pupils are equal, round, and reactive to light. No scleral icterus. Neck: No JVD present. Cardiovascular: Regular rhythm. Exam reveals no gallop and no friction rub. No murmur heard. Pulmonary/Chest: No stridor. No respiratory distress. has no wheezes. has no rales. Abdominal: Soft. Bowel sounds are normal. exhibits no distension. No tenderness. No rebound and no guarding. Musculoskeletal:Normal strength. exhibits no tenderness. Neurological:alert and oriented to person, place, and time. Skin: Skin is warm and dry. No rash noted. Not diaphoretic. No erythema. No pallor.    Psychiatric: Memory, affect and judgment normal    Recent Results (from the past 24 hour(s))   EKG, 12 LEAD, INITIAL    Collection Time: 04/26/20  6:29 AM   Result Value Ref Range    Ventricular Rate 70 BPM    Atrial Rate 70 BPM    P-R Interval 160 ms    QRS Duration 130 ms    Q-T Interval 454 ms    QTC Calculation (Bezet) 490 ms    Calculated P Axis 82 degrees    Calculated R Axis -51 degrees    Calculated T Axis 106 degrees    Diagnosis       Normal sinus rhythm  Possible Left atrial enlargement  Left axis deviation  Left bundle branch block  Abnormal ECG  When compared with ECG of 03-APR-2020 02:01,  Left bundle branch block is now present  Criteria for Anterior infarct are no longer present  Confirmed by Vincent Flores MD, Violet Hough ([id number]) on 4/26/2020 10:41:37 AM     EKG, 12 LEAD, INITIAL    Collection Time: 04/26/20 11:50 PM   Result Value Ref Range    Ventricular Rate 68 BPM    Atrial Rate 68 BPM    P-R Interval 156 ms    QRS Duration 92 ms    Q-T Interval 460 ms    QTC Calculation (Bezet) 489 ms    Calculated P Axis 80 degrees    Calculated R Axis 56 degrees    Calculated T Axis 117 degrees    Diagnosis       Normal sinus rhythm  T wave abnormality, consider anterolateral ischemia  Prolonged QT  Abnormal ECG  When compared with ECG of 26-APR-2020 06:29,  Left bundle branch block is no longer present     CBC WITH AUTOMATED DIFF    Collection Time: 04/27/20  1:30 AM   Result Value Ref Range    WBC 8.8 4.6 - 13.2 K/uL    RBC 4.21 4.20 - 5.30 M/uL    HGB 12.1 12.0 - 16.0 g/dL    HCT 36.4 35.0 - 45.0 %    MCV 86.5 74.0 - 97.0 FL    MCH 28.7 24.0 - 34.0 PG    MCHC 33.2 31.0 - 37.0 g/dL    RDW 14.8 (H) 11.6 - 14.5 %    PLATELET 116 089 - 501 K/uL    MPV 11.7 9.2 - 11.8 FL    NEUTROPHILS 71 40 - 73 %    LYMPHOCYTES 24 21 - 52 %    MONOCYTES 5 3 - 10 %    EOSINOPHILS 0 0 - 5 %    BASOPHILS 0 0 - 2 %    ABS. NEUTROPHILS 6.2 1.8 - 8.0 K/UL    ABS. LYMPHOCYTES 2.1 0.9 - 3.6 K/UL    ABS. MONOCYTES 0.5 0.05 - 1.2 K/UL    ABS. EOSINOPHILS 0.0 0.0 - 0.4 K/UL    ABS.  BASOPHILS 0.0 0.0 - 0.1 K/UL    DF AUTOMATED     TROPONIN I    Collection Time: 04/27/20  1:30 AM   Result Value Ref Range    Troponin-I, QT <0.02 0.0 - 8.613 NG/ML   METABOLIC PANEL, COMPREHENSIVE    Collection Time: 04/27/20  1:30 AM   Result Value Ref Range    Sodium 143 136 - 145 mmol/L    Potassium 4.1 3.5 - 5.5 mmol/L    Chloride 108 100 - 111 mmol/L    CO2 27 21 - 32 mmol/L    Anion gap 8 3.0 - 18 mmol/L    Glucose 121 (H) 74 - 99 mg/dL    BUN 19 (H) 7.0 - 18 MG/DL    Creatinine 1.11 0.6 - 1.3 MG/DL    BUN/Creatinine ratio 17 12 - 20      GFR est AA >60 >60 ml/min/1.73m2 GFR est non-AA 51 (L) >60 ml/min/1.73m2    Calcium 8.7 8.5 - 10.1 MG/DL    Bilirubin, total 0.3 0.2 - 1.0 MG/DL    ALT (SGPT) 14 13 - 56 U/L    AST (SGOT) 9 (L) 10 - 38 U/L    Alk.  phosphatase 110 45 - 117 U/L    Protein, total 7.0 6.4 - 8.2 g/dL    Albumin 3.5 3.4 - 5.0 g/dL    Globulin 3.5 2.0 - 4.0 g/dL    A-G Ratio 1.0 0.8 - 1.7     PTT    Collection Time: 04/27/20  1:30 AM   Result Value Ref Range    aPTT 27.9 23.0 - 36.4 SEC   PROTHROMBIN TIME + INR    Collection Time: 04/27/20  1:30 AM   Result Value Ref Range    Prothrombin time 14.1 11.5 - 15.2 sec    INR 1.1 0.8 - 1.2           Diagnostic Studies:  EKG:  NSR, No ST changes per ED    CXR  Report pending

## 2020-04-27 NOTE — ED NOTES
Updated Dr Inna Cosme on pt's condition and request for percocet. Dr Inna Cosme reports  no percocet will be ordered; I am familiar with this patent. No percocet.

## 2020-04-27 NOTE — ED NOTES
Pt refusing VS/ECG monitor. States \"I want to talk to the admitting doctor and then I'm leaving.  I'm not staying here\"

## 2020-04-27 NOTE — CONSULTS
[facility] - Consult Note    Consultation request by Jose M Castrejon MD for advice/opinion related to evaluating Unstable angina [facility]) [I20.0]    Date of  Admission: 4/26/2020 11:42 PM   Primary Care Physician:  Robert Shearer NP     Assessment:     Patient Active Problem List   Diagnosis Code    Asthma exacerbation attacks J45. 0    Status asthmaticus with COPD (chronic obstructive pulmonary disease) ([facility]) J44.9, J45.902    Abnormal CT of the chest R93.89    Asthma J45.909    Acute on chronic respiratory failure with hypoxemia ([facility]) J96.21    Acute exacerbation of chronic obstructive pulmonary disease (COPD) ([facility]) J44.1    Respiratory failure ([facility]) J96.90    Acute encephalopathy G93.40    Asthma exacerbation J45. 901    Acute respiratory failure with hypercapnia ([facility]) J96.02    Acute exacerbation of COPD with asthma ([facility] [address] J44.1, J45.901    Essential hypertension I10    Morbid obesity due to excess calories ([facility]) E66.01    Acute asthma exacerbation J45. 0    Hypertensive heart failure ([facility]) I11.0    Sleep apnea G47.30    COPD (chronic obstructive pulmonary disease) with chronic bronchitis ([facility]) J44.9    Acute bronchitis with chronic obstructive pulmonary disease (COPD) ([facility]) J44.0, J20.9    T wave inversion in EKG R94.31    COPD (chronic obstructive pulmonary disease) ([facility]) J44.9    Hypertensive urgency I16.0    Tobacco use Z72.0    Incarcerated incisional hernia K43.0    Bilious vomiting R11.14    Ileus ([facility]) K56.7    Abdominal pain R10.9    S/P hernia repair Z98.890, Z87.19    Partial small bowel obstruction ([facility]) K56.600    CKD (chronic kidney disease) stage 3, GFR 30-59 ml/min ([facility]) N18.3    Cocaine abuse ([facility]) F14.10    Drug-seeking behavior Z76.5    DVT prophylaxis Z29.9    Fibromyalgia M79.7    Anasarca R60.1    Abdominal hernia without obstruction or gangrene K46.9    Depression F32.9    Fe deficiency anemia D50.9    Gastroesophageal reflux disease without esophagitis K21.9    HLD (hyperlipidemia) E78.5    Thyroid goiter E04.9    MDRO (multiple drug resistant organisms) resistance Z16.35    On home O2 Z99.81    Polysubstance abuse ([facility]) O55.11    Uncomplicated severe persistent asthma J45.50    Vocal cord disease J38.3    ACS (acute coronary syndrome) ([facility]) I24.9    NSTEMI (non-ST elevated myocardial infarction) ([facility]) I21.4    Hypokalemia E87.6    CHF (congestive heart failure) ([facility]) I50.9    SBO (small bowel obstruction) ([facility]) K56.609    Cough R05    Dyspnea R06.00    COPD exacerbation ([facility]) J44.1    History of chronic CHF Z86.79    Generalized body aches R52    Suspected Covid-19 Virus Infection R68.89    Pneumonia J18.9    Pericarditis, viral B33.23    CAP (community acquired pneumonia) J18.9    Myocarditis ([address] I51.4    Unstable angina ([facility]) I20.0     -Chest pain- had dynamic changes to the lateral leads on presentation and repeat EKG had corrected and back to her normal baseline. Troponin's x3 are negative. Cardiac cath one month ago normal.   -Chronic abdominal pain from adhesions. No acute process or symptoms reported. -Transient left bundle branch block. This has been present in the past.  -Acute partial small bowel obstruction- NPO  -HTN out of control and on NTG drip and starting hydralazine.  -Dyslipidemia  -History of prolonged heroin abuse. Patient states she was a daily user up until last year since starting on a methadone program.  -Tobacco use disorder. Ongoing.     -No primary cardiologist      Plan:     Independently seen and evaluated. Agree with below. She underwent coronary angiography on 3/26/2020. This study revealed no significant fixed CAD. There was normal flow. There was overall normal LV function. No further coronary evaluation is needed at this time. Will be available as needed. Thank you.       Stable  By patient history and reports she came into the hospital to get refills for her chronic breathing problems. She has pain to the upper abdomen and lower chest with wheezing. EKG at her baseline. Troponin's x 3 negative with normal cath one month ago. Have stopped heparin drip and would suggest albuterol treatment to help with her symptoms, which seem to be more respiratory than cardiac. No new cardiac workup plans at this time. Will follow if admitted to hospital.     History of Present Illness: This is a 53 y.o. female admitted for Unstable angina ([facility] [address] [I20.0]. Patient complains of:  Patient presented to the ER with the complaint of trouble breathing and that she ran out of her meds. She was initially seen in the ER then states that she was supposed to go home with Rx's. She left before being discharged. She returned later in the evening with the same complaints and was found to have changes to her EKG and was admitted. Cardiology was asked to see patient. She currently is complaining of abdominal pain, which is chronic and pain on inspiration. No other symptoms. Cardiac risk factors: smoking/ tobacco exposure, family history, hypertension      Review of Symptoms:  Except as stated above include:  Constitutional:  negative  Respiratory:  negative  Cardiovascular:  negative  Gastrointestinal: negative  Genitourinary:  negative  Musculoskeletal:  Negative  Neurological:  Negative  Dermatological:  Negative  Endocrinological: Negative  Psychological:  Negative    Pertinent items are noted in HPI.      Past Medical History:     Past Medical History:   Diagnosis Date    Asthma     CHF (congestive heart failure) (HCC)     Chronic obstructive pulmonary disease (HCC)     Dependence on supplemental oxygen     Endocrine disease     thyroid issues    Gastrointestinal disorder     \"blockage in my stomach\"    Hypercholesterolemia     Hypertension          Social History:     Social History     Socioeconomic History    Marital status: SINGLE     Spouse name: Not on file    Number of children: Not on file    Years of education: Not on file    Highest education level: Not on file   Tobacco Use    Smoking status: Current Every Day Smoker     Packs/day: 0.25    Smokeless tobacco: Former User   Substance and Sexual Activity    Alcohol use: No     Comment: socially    Drug use: Not Currently     Types: Heroin    Sexual activity: Never     Comment: last used 9 years ago        Family History:   No family history on file. Medications:   No Known Allergies     Current Facility-Administered Medications   Medication Dose Route Frequency    montelukast (SINGULAIR) tablet 10 mg  10 mg Oral QHS    arformoteroL (BROVANA) neb solution 15 mcg  15 mcg Nebulization BID    budesonide (PULMICORT) 500 mcg/2 ml nebulizer suspension  500 mcg Nebulization BID    ipratropium (ATROVENT) 0.02 % nebulizer solution 0.5 mg  0.5 mg Nebulization Q4H PRN    atorvastatin (LIPITOR) tablet 20 mg  20 mg Oral QHS    cloNIDine HCL (CATAPRES) tablet 0.2 mg  0.2 mg Oral BID    escitalopram oxalate (LEXAPRO) tablet 10 mg  10 mg Oral QPM    metoprolol tartrate (LOPRESSOR) tablet 12.5 mg  12.5 mg Oral Q12H    roflumilast (DALIRESP) tablet 500 mcg  500 mcg Oral DAILY    amLODIPine (NORVASC) tablet 10 mg  10 mg Oral DAILY    docusate sodium (COLACE) capsule 100 mg  100 mg Oral BID    albuterol (ACCUNEB) nebulizer solution 1.25 mg  1.25 mg Inhalation Q4H PRN    aspirin delayed-release tablet 81 mg  81 mg Oral DAILY    ondansetron (ZOFRAN ODT) tablet 4 mg  4 mg Oral Q8H PRN    famotidine (PEPCID) tablet 20 mg  20 mg Oral DAILY    albuterol (PROVENTIL HFA, VENTOLIN HFA, PROAIR HFA) inhaler 2 Puff  2 Puff Inhalation Q4H PRN    ketorolac (TORADOL) injection 15 mg  15 mg IntraVENous Q6H PRN     Current Outpatient Medications   Medication Sig    aspirin delayed-release 81 mg tablet Take 1 Tab by mouth daily.     ondansetron (ZOFRAN ODT) 4 mg disintegrating tablet Take 1 Tab by mouth every eight (8) hours as needed for Nausea. Indications: prevent nausea and vomiting after surgery    famotidine (PEPCID) 20 mg tablet Take 1 Tab by mouth daily.  albuterol (PROVENTIL HFA, VENTOLIN HFA, PROAIR HFA) 90 mcg/actuation inhaler Take 2 Puffs by inhalation every four (4) hours as needed for Wheezing or Shortness of Breath. Indications: asthma attack, chronic obstructive pulmonary disease    albuterol (ACCUNEB) 1.25 mg/3 mL nebu Take 3 mL by inhalation every four (4) hours as needed for Wheezing (wheezing).  OXYGEN-AIR DELIVERY SYSTEMS 3 L by IntraNASal route as needed for Other (sob).  docusate sodium (COLACE) 100 mg capsule Take 1 Cap by mouth two (2) times a day.  fluticasone furoate-vilanterol (BREO ELLIPTA) 200-25 mcg/dose inhaler Take 1 Puff by inhalation daily.  amLODIPine (NORVASC) 10 mg tablet Take 1 Tab by mouth daily.  arformoterol (BROVANA) 15 mcg/2 mL nebu neb solution 2 mL by Nebulization route two (2) times a day.  budesonide (PULMICORT) 0.5 mg/2 mL nbsp 2 mL by Nebulization route two (2) times a day.  tiotropium (SPIRIVA) 18 mcg inhalation capsule Take 1 Cap by inhalation daily.  atorvastatin (LIPITOR) 20 mg tablet Take 1 Tab by mouth nightly.  cloNIDine HCl (CATAPRES) 0.2 mg tablet Take 1 Tab by mouth two (2) times a day.  escitalopram oxalate (LEXAPRO) 10 mg tablet Take 1 Tab by mouth every evening.  metoprolol tartrate (LOPRESSOR) 25 mg tablet Take 0.5 Tabs by mouth every twelve (12) hours.  roflumilast (DALIRESP) 500 mcg tab tablet Take 1 Tab by mouth daily.  OTHER Incentive spirometry- use as directed    naloxone (NARCAN) 4 mg/actuation nasal spray Use 1 spray intranasally, then discard. Repeat with new spray every 2 min as needed for opioid overdose symptoms, alternating nostrils.  montelukast (SINGULAIR) 10 mg tablet Take 1 Tab by mouth nightly.          Physical Exam:     Visit Vitals  SpO2 100%     BP Readings from Last 3 Encounters:   04/26/20 164/87   04/03/20 132/85 04/02/20 172/77     Pulse Readings from Last 3 Encounters:   04/26/20 60   04/03/20 88   04/02/20 90     Wt Readings from Last 3 Encounters:   04/26/20 70.8 kg (156 lb)   04/03/20 71.2 kg (157 lb)   03/25/20 75.6 kg (166 lb 11.2 oz)       General:  alert, cooperative, no distress, appears stated age  Neck:  nontender, no JVD  Lungs:  wheezes mild at bases  Heart:  regular rate and rhythm, S1, S2 normal, no murmur, click, rub or gallop  Abdomen:  mild tenderness throughout abdomen and more in the upper regions than lower. Extremities:  extremities normal, atraumatic, no cyanosis or edema  Skin: Warm and dry.  no hyperpigmentation, vitiligo, or suspicious lesions  Neuro: alert, oriented x3, affect appropriate, no focal neurological deficits, moves all extremities well, no involuntary movements  Psych: non focal     Data Review:     Recent Labs     04/27/20  0130   WBC 8.8   HGB 12.1   HCT 36.4        Recent Labs     04/27/20  0130      K 4.1      CO2 27   *   BUN 19*   CREA 1.11   CA 8.7   ALB 3.5   SGOT 9*   ALT 14   INR 1.1       Results for orders placed or performed during the hospital encounter of 04/26/20   EKG, 12 LEAD, INITIAL   Result Value Ref Range    Ventricular Rate 68 BPM    Atrial Rate 68 BPM    P-R Interval 156 ms    QRS Duration 92 ms    Q-T Interval 460 ms    QTC Calculation (Bezet) 489 ms    Calculated P Axis 80 degrees    Calculated R Axis 56 degrees    Calculated T Axis 117 degrees    Diagnosis       Normal sinus rhythm  T wave abnormality, consider anterolateral ischemia  Prolonged QT  Abnormal ECG  When compared with ECG of 26-APR-2020 06:29,  Left bundle branch block is no longer present         All Cardiac Markers in the last 24 hours:    Lab Results   Component Value Date/Time    TROIQ <0.02 04/27/2020 01:30 AM       Last Lipid:  No results found for: CHOL, CHOLX, CHLST, CHOLV, HDL, HDLP, LDL, LDLC, DLDLP, TGLX, TRIGL, TRIGP, CHHD, CHHDX    Signed By: Giovanni Jon Bin, [id number]     April 27, 2020

## 2020-04-27 NOTE — ED TRIAGE NOTES
PT arrived to ED with compliant of chest pain and SOB. PT has history of NSTEMI and MI.  PT also has hx of COPD. PT reports being outside today working in the yard and may have over done it. Pt is alert and oriented.

## 2020-04-28 ENCOUNTER — PATIENT OUTREACH (OUTPATIENT)
Dept: CASE MANAGEMENT | Age: 55
End: 2020-04-28

## 2020-04-28 LAB
ATRIAL RATE: 68 BPM
CALCULATED P AXIS, ECG09: 80 DEGREES
CALCULATED R AXIS, ECG10: 56 DEGREES
CALCULATED T AXIS, ECG11: 117 DEGREES
DIAGNOSIS, 93000: NORMAL
P-R INTERVAL, ECG05: 156 MS
Q-T INTERVAL, ECG07: 460 MS
QRS DURATION, ECG06: 92 MS
QTC CALCULATION (BEZET), ECG08: 489 MS
VENTRICULAR RATE, ECG03: 68 BPM

## 2020-04-29 NOTE — ED PROVIDER NOTES
EMERGENCY DEPARTMENT HISTORY AND PHYSICAL EXAM      Date: 4/26/2020  Patient Name: Abraham Webb    History of Presenting Illness     Chief Complaint   Patient presents with    Chest Pain       History (Context): Abraham Webb is a 53 y.o. gentleman with a complicated set of active comorbid conditions as noted below in the past medical history, who presents with 1 day of subacute onset, intermittent, severe substernal chest pain. The pain is associated with dyspnea and diaphoresis. Pain exacerbated by exertion    On review of systems, the patient denies fever, chills, trauma, back pain, abdominal pain, nausea, vomiting, diaphoresis. PCP: Khalif Barrett NP    Current Outpatient Medications   Medication Sig Dispense Refill    albuterol (ACCUNEB) 1.25 mg/3 mL nebu Take 3 mL by inhalation every four (4) hours as needed for Wheezing (wheezing). 25 Each 0    albuterol (PROVENTIL HFA, VENTOLIN HFA, PROAIR HFA) 90 mcg/actuation inhaler Take 2 Puffs by inhalation every four (4) hours as needed for Wheezing or Shortness of Breath. Indications: asthma attack, chronic obstructive pulmonary disease 1 Inhaler 3    cloNIDine HCL (CATAPRES) 0.2 mg tablet Take 1 Tab by mouth two (2) times a day. 60 Tab 1    aspirin delayed-release 81 mg tablet Take 1 Tab by mouth daily. 30 Tab 0    ondansetron (ZOFRAN ODT) 4 mg disintegrating tablet Take 1 Tab by mouth every eight (8) hours as needed for Nausea. Indications: prevent nausea and vomiting after surgery 20 Tab 0    famotidine (PEPCID) 20 mg tablet Take 1 Tab by mouth daily. 30 Tab 1    OXYGEN-AIR DELIVERY SYSTEMS 3 L by IntraNASal route as needed for Other (sob).  docusate sodium (COLACE) 100 mg capsule Take 1 Cap by mouth two (2) times a day. 120 Cap 0    fluticasone furoate-vilanterol (BREO ELLIPTA) 200-25 mcg/dose inhaler Take 1 Puff by inhalation daily.  amLODIPine (NORVASC) 10 mg tablet Take 1 Tab by mouth daily.  30 Tab 0    arformoterol (BROVANA) 15 mcg/2 mL nebu neb solution 2 mL by Nebulization route two (2) times a day. 60 Vial 0    budesonide (PULMICORT) 0.5 mg/2 mL nbsp 2 mL by Nebulization route two (2) times a day. 60 Each 0    tiotropium (SPIRIVA) 18 mcg inhalation capsule Take 1 Cap by inhalation daily. 30 Cap 0    atorvastatin (LIPITOR) 20 mg tablet Take 1 Tab by mouth nightly. 30 Tab 0    escitalopram oxalate (LEXAPRO) 10 mg tablet Take 1 Tab by mouth every evening. 30 Tab 0    metoprolol tartrate (LOPRESSOR) 25 mg tablet Take 0.5 Tabs by mouth every twelve (12) hours. 30 Tab 0    roflumilast (DALIRESP) 500 mcg tab tablet Take 1 Tab by mouth daily. 30 Tab 0    OTHER Incentive spirometry- use as directed 1 Each 0    naloxone (NARCAN) 4 mg/actuation nasal spray Use 1 spray intranasally, then discard. Repeat with new spray every 2 min as needed for opioid overdose symptoms, alternating nostrils. 1 Each 0    montelukast (SINGULAIR) 10 mg tablet Take 1 Tab by mouth nightly. 30 Tab 1       Past History     Past Medical History:  Past Medical History:   Diagnosis Date    Asthma     CHF (congestive heart failure) (HCC)     Chronic obstructive pulmonary disease (HCC)     Dependence on supplemental oxygen     Endocrine disease     thyroid issues    Gastrointestinal disorder     \"blockage in my stomach\"    Hypercholesterolemia     Hypertension        Past Surgical History:  Past Surgical History:   Procedure Laterality Date    HX GI      Exploratory laparotomy with lysis of adhesions and primary repair of incarcerated umbilical hernia       Family History:  No family history on file.     Social History:  Social History     Tobacco Use    Smoking status: Current Every Day Smoker     Packs/day: 0.25    Smokeless tobacco: Former User   Substance Use Topics    Alcohol use: No     Comment: socially    Drug use: Not Currently     Types: Heroin       Allergies:  No Known Allergies    PMH, PSH, family history, social history, allergies reviewed with the patient with significant items noted above. Review of Systems   As per HPI, otherwise reviewed and negative. Physical Exam     Vitals:    04/27/20 0815 04/27/20 0824 04/27/20 0830 04/27/20 0845   BP: 131/80 129/72 139/89 (!) 161/97   Pulse: (!) 57 (!) 54 (!) 53 (!) 58   Resp: 19 15 16 17   Temp:       SpO2:           Gen: Mildly ill-appearing, in no acute distress   HEENT: Normocephalic, sclera anicteric  Cardiovascular: Normal rate, regular rhythm, no murmurs, rubs, gallops. Pulses intact and equal distally. Pulmonary: No respiratory distress. No stridor. Clear lungs. ABD: Soft, nontender, nondistended. Neuro: Alert. Normal speech. Normal mentation. Psych: Normal thought content and thought processes. : No CVA tenderness  EXT: No edema. Moves all extremities well. No cyanosis or clubbing. Skin: Warm and well-perfused. Other:        Diagnostic Study Results     Labs -   No results found for this or any previous visit (from the past 12 hour(s)). Radiologic Studies -   XR CHEST PORT   Final Result   IMPRESSION:      Negative chest.           CT Results  (Last 48 hours)    None        CXR Results  (Last 48 hours)    None            Medical Decision Making   I am the first provider for this patient. I reviewed the vital signs, available nursing notes, past medical history, past surgical history, family history and social history. Vital Signs-Reviewed the patient's vital signs. EKG: Interpreted by myself. Patient has ischemic changes in the precordial leads, which is changed from earlier today when she had a left bundle branch block, which was new from prior which showed some ischemic changes in the precordial leads, which is new from a month ago which showed no changes. chronology of EKGs suggests ongoing ischemic chest pain     Records Reviewed: Personally, on initial evaluation    MDM:   Patient presents with substernal chest pain.   Exam significant for mildly ill-appearing.    DDX considered: ACS, unstable angina, pneumothorax, GERD, MSK chest pain, anxiety  DDX thought to be less likely but also considered due to high risk condition: Aortic dissection, PE, Boerhaave's, pericarditis, mediastinitis    Patient condition on initial evaluation: Moderately ill    Plan:   Pain Control  Close Observation  Cardiac monitoring  As per orders noted below:  Orders Placed This Encounter    XR CHEST PORT    CBC WITH AUTOMATED DIFF    TROPONIN I    COMPREHENSIVE METABOLIC PANEL    PTT    PROTHROMBIN TIME + INR    CBC W/ AUTOMATED DIFF    PTT - BASELINE PRIOR TO INITIATION OF HEPARIN INFUSION    EKG, 12 LEAD, INITIAL    DISCONTD: nitroglycerin (NITROBID) 2 % ointment 1 Inch    heparin (porcine) 1,000 unit/mL injection 4,000 Units    DISCONTD: heparin 25,000 units in D5W 250 ml infusion    morphine injection 4 mg    albuterol-ipratropium (DUO-NEB) 2.5 MG-0.5 MG/3 ML    DISCONTD: montelukast (SINGULAIR) tablet 10 mg    DISCONTD: arformoteroL (BROVANA) neb solution 15 mcg    DISCONTD: budesonide (PULMICORT) 500 mcg/2 ml nebulizer suspension    DISCONTD: ipratropium (ATROVENT) 0.02 % nebulizer solution 0.5 mg    DISCONTD: atorvastatin (LIPITOR) tablet 20 mg    DISCONTD: cloNIDine HCL (CATAPRES) tablet 0.2 mg    DISCONTD: escitalopram oxalate (LEXAPRO) tablet 10 mg    DISCONTD: metoprolol tartrate (LOPRESSOR) tablet 12.5 mg    DISCONTD: roflumilast (DALIRESP) tablet 500 mcg    DISCONTD: amLODIPine (NORVASC) tablet 10 mg    DISCONTD: docusate sodium (COLACE) capsule 100 mg    DISCONTD: albuterol (ACCUNEB) nebulizer solution 1.25 mg    DISCONTD: aspirin delayed-release tablet 81 mg    DISCONTD: ondansetron (ZOFRAN ODT) tablet 4 mg    DISCONTD: famotidine (PEPCID) tablet 20 mg    DISCONTD: albuterol (PROVENTIL HFA, VENTOLIN HFA, PROAIR HFA) inhaler 2 Puff    DISCONTD: ketorolac (TORADOL) injection 15 mg    albuterol (ACCUNEB) 1.25 mg/3 mL nebu    albuterol (PROVENTIL HFA, VENTOLIN HFA, PROAIR HFA) 90 mcg/actuation inhaler    cloNIDine HCL (CATAPRES) 0.2 mg tablet    INITIAL PHYSICIAN ORDER: INPATIENT Telemetry; 3. Patient receiving treatment that can only be provided in an inpatient setting (further clarification in H&P documentation)          ED Course:   Started the patient on a heparin drip immediately. Patient blood pressure would not support nitroglycerin, so treated pain with morphine. ED Course as of Apr 29 1030   Mon Apr 27, 2020   0248 Spoke with cardiology. They agree with admission. [DT]   0960 Spoke to the hospitalist.  Patient is admitted. [DT]      ED Course User Index  [DT] Daisy Greco MD         Patient condition at time of disposition: Moderately ill      Critical Care Time:  The services I provided to this patient were to treat and/or prevent clinically significant deterioration that could result in the failure of one or more body systems and/or organ systems due to chest pain with active EKG changes requiring heparin drip suggestive of unstable angina. Services included the following:  -reviewing nursing notes and old charts  -vital sign assessments  -direct patient care  -medication orders and management  -interpreting and reviewing diagnostic studies/labs  -re-evaluations  -documentation time    Aggregate critical care time was 40 minutes, which includes only time during which I was engaged in work directly related to the patient's care as described above, whether I was at bedside or elsewhere in the Emergency Department. It did not include time spent performing other reported procedures or the services of residents, students, nurses, or advance practice providers. DMT      Diagnosis     Clinical Impression:   1. Unstable angina ([address]    2.  Ischemic chest pain ([address]        Signed,  Melody Mckeon MD  Emergency Physician  [facility]    As a voice dictation software was utilized to dictate this note, minor word transpositions can occur. I apologize for confusing wording and typographic errors. Please feel free to contact me for clarification.

## 2020-04-30 ENCOUNTER — PATIENT OUTREACH (OUTPATIENT)
Dept: CASE MANAGEMENT | Age: 55
End: 2020-04-30

## 2020-04-30 NOTE — PROGRESS NOTES
ACM attempted to contact patient at listed number for COVID -19 screening following discharge from ED/Hospital. VM left identifying self, direct contact information given. This is third attempt to contact patient. Episode resolved.

## 2020-05-01 ENCOUNTER — PATIENT OUTREACH (OUTPATIENT)
Dept: CASE MANAGEMENT | Age: 55
End: 2020-05-01

## 2020-05-01 ENCOUNTER — HOSPITAL ENCOUNTER (EMERGENCY)
Age: 55
Discharge: HOME | End: 2020-05-02
Attending: EMERGENCY MEDICINE | Admitting: EMERGENCY MEDICINE
Payer: MEDICAID

## 2020-05-01 ENCOUNTER — APPOINTMENT (OUTPATIENT)
Dept: GENERAL RADIOLOGY | Age: 55
End: 2020-05-01
Attending: STUDENT IN AN ORGANIZED HEALTH CARE EDUCATION/TRAINING PROGRAM
Payer: MEDICAID

## 2020-05-01 ENCOUNTER — HOSPITAL ENCOUNTER (EMERGENCY)
Age: 55
Discharge: HOME | End: 2020-05-01
Attending: EMERGENCY MEDICINE
Payer: MEDICAID

## 2020-05-01 VITALS
SYSTOLIC BLOOD PRESSURE: 151 MMHG | TEMPERATURE: 98.2 F | DIASTOLIC BLOOD PRESSURE: 80 MMHG | OXYGEN SATURATION: 100 % | HEART RATE: 67 BPM | RESPIRATION RATE: 12 BRPM

## 2020-05-01 DIAGNOSIS — R07.89 ATYPICAL CHEST PAIN: Primary | ICD-10-CM

## 2020-05-01 DIAGNOSIS — R07.9 CHEST PAIN, UNSPECIFIED TYPE: Primary | ICD-10-CM

## 2020-05-01 DIAGNOSIS — F11.10 HEROIN ABUSE (HCC): ICD-10-CM

## 2020-05-01 LAB
ANION GAP SERPL CALC-SCNC: 4 MMOL/L (ref 3–18)
ATRIAL RATE: 62 BPM
BASOPHILS # BLD: 0 K/UL (ref 0–0.1)
BASOPHILS NFR BLD: 0 % (ref 0–2)
BUN SERPL-MCNC: 18 MG/DL (ref 7–18)
BUN/CREAT SERPL: 19 (ref 12–20)
CALCIUM SERPL-MCNC: 8.3 MG/DL (ref 8.5–10.1)
CALCULATED P AXIS, ECG09: 69 DEGREES
CALCULATED R AXIS, ECG10: 11 DEGREES
CALCULATED T AXIS, ECG11: 107 DEGREES
CHLORIDE SERPL-SCNC: 111 MMOL/L (ref 100–111)
CK MB CFR SERPL CALC: 2.1 % (ref 0–4)
CK MB CFR SERPL CALC: NORMAL % (ref 0–4)
CK MB CFR SERPL CALC: NORMAL % (ref 0–4)
CK MB SERPL-MCNC: 1.1 NG/ML (ref 5–25)
CK MB SERPL-MCNC: <1 NG/ML (ref 5–25)
CK MB SERPL-MCNC: <1 NG/ML (ref 5–25)
CK SERPL-CCNC: 44 U/L (ref 26–192)
CK SERPL-CCNC: 45 U/L (ref 26–192)
CK SERPL-CCNC: 52 U/L (ref 26–192)
CO2 SERPL-SCNC: 29 MMOL/L (ref 21–32)
CREAT SERPL-MCNC: 0.94 MG/DL (ref 0.6–1.3)
DIAGNOSIS, 93000: NORMAL
DIFFERENTIAL METHOD BLD: ABNORMAL
EOSINOPHIL # BLD: 0.3 K/UL (ref 0–0.4)
EOSINOPHIL NFR BLD: 4 % (ref 0–5)
ERYTHROCYTE [DISTWIDTH] IN BLOOD BY AUTOMATED COUNT: 14.7 % (ref 11.6–14.5)
GLUCOSE SERPL-MCNC: 99 MG/DL (ref 74–99)
HCT VFR BLD AUTO: 36.1 % (ref 35–45)
HGB BLD-MCNC: 11.4 G/DL (ref 12–16)
LYMPHOCYTES # BLD: 2.9 K/UL (ref 0.9–3.6)
LYMPHOCYTES NFR BLD: 40 % (ref 21–52)
MCH RBC QN AUTO: 28.3 PG (ref 24–34)
MCHC RBC AUTO-ENTMCNC: 31.6 G/DL (ref 31–37)
MCV RBC AUTO: 89.6 FL (ref 74–97)
MONOCYTES # BLD: 0.4 K/UL (ref 0.05–1.2)
MONOCYTES NFR BLD: 5 % (ref 3–10)
NEUTS SEG # BLD: 3.6 K/UL (ref 1.8–8)
NEUTS SEG NFR BLD: 51 % (ref 40–73)
P-R INTERVAL, ECG05: 148 MS
PLATELET # BLD AUTO: 221 K/UL (ref 135–420)
PMV BLD AUTO: 10.7 FL (ref 9.2–11.8)
POTASSIUM SERPL-SCNC: 3.8 MMOL/L (ref 3.5–5.5)
Q-T INTERVAL, ECG07: 470 MS
QRS DURATION, ECG06: 144 MS
QTC CALCULATION (BEZET), ECG08: 477 MS
RBC # BLD AUTO: 4.03 M/UL (ref 4.2–5.3)
SODIUM SERPL-SCNC: 144 MMOL/L (ref 136–145)
TROPONIN I SERPL-MCNC: <0.02 NG/ML (ref 0–0.04)
VENTRICULAR RATE, ECG03: 62 BPM
WBC # BLD AUTO: 7.2 K/UL (ref 4.6–13.2)

## 2020-05-01 PROCEDURE — 93005 ELECTROCARDIOGRAM TRACING: CPT

## 2020-05-01 PROCEDURE — 71045 X-RAY EXAM CHEST 1 VIEW: CPT

## 2020-05-01 PROCEDURE — 82550 ASSAY OF CK (CPK): CPT

## 2020-05-01 PROCEDURE — 85025 COMPLETE CBC W/AUTO DIFF WBC: CPT

## 2020-05-01 PROCEDURE — 99284 EMERGENCY DEPT VISIT MOD MDM: CPT

## 2020-05-01 PROCEDURE — 80048 BASIC METABOLIC PNL TOTAL CA: CPT

## 2020-05-01 RX ORDER — ACETAMINOPHEN 500 MG
1000 TABLET ORAL
Status: DISCONTINUED | OUTPATIENT
Start: 2020-05-01 | End: 2020-05-01 | Stop reason: HOSPADM

## 2020-05-01 NOTE — ED NOTES
Patient requests to leave. Provider notified. Patient aware of the risks. Patient agreed to stay until her medical ride arrives.

## 2020-05-01 NOTE — ED TRIAGE NOTES
Patient presents to the ED via EMS with complaints of chest pain radiating to the back x tonight. Patient is alert and oriented x 4. No obvious signs of distress noted.

## 2020-05-01 NOTE — DISCHARGE INSTRUCTIONS

## 2020-05-01 NOTE — ED NOTES
Patient armband removed and given to patient to take home. Patient was informed of the privacy risks if armband lost or stolen. Patient utilized a wheelchair to [employer].

## 2020-05-01 NOTE — ED NOTES
Patient agreed for this nurse to draw blood before leaving. Patient disconnected off the cardiac monitor. Patient advised to go wait in the lobby for her ride. Patient verbalized understanding.

## 2020-05-01 NOTE — PROGRESS NOTES
5/1/2020 3:19 PM     CTN attempted to contact patient for Covid Risk. Patient admitted to [facility] on 4/30/2020 and discharged on 5/1/2020 for chest pain. Message left introducing myself, the purpose of the call and giving my contact information. Requested that patient call back at her earliest convenience.

## 2020-05-01 NOTE — ED PROVIDER NOTES
EMERGENCY DEPARTMENT HISTORY AND PHYSICAL EXAM    2:30 AM      Date: 5/1/2020  Patient Name: Daphne Collins    History of Presenting Illness     No chief complaint on file. History Provided By: Patient  Location/Duration/Severity/Modifying factors   HPI   76-year-old -American female with past medical history significant for hypertension COPD CHF asthma and hypercholesterolemia presents with chest pain. Patient describes the pain as pressure-like sensation onset 2 hours prior. States chest pain was intermittent and is associated with shortness of breath. Patient denies nausea vomiting diaphoresis and states the shortness of breath is not worsened with exertion and/or improved with rest.  Does admit to numbness of the left arm. Patient has extensive cardiac evaluation history. She was recently admitted here and underwent coronary artery catheterization which displayed no high grade plaques medical intervention. Patient does state this chest pain is similar nature to her previous chest pain episodes. Describe her chest pain patient states specifically, that her stomach was \"running \"and subsequently her vagina had a pulling sensation just prior to having the aforementioned chest pain. PCP: Chidi Bell NP    Current Outpatient Medications   Medication Sig Dispense Refill    albuterol (ACCUNEB) 1.25 mg/3 mL nebu Take 3 mL by inhalation every four (4) hours as needed for Wheezing (wheezing). 25 Each 0    albuterol (PROVENTIL HFA, VENTOLIN HFA, PROAIR HFA) 90 mcg/actuation inhaler Take 2 Puffs by inhalation every four (4) hours as needed for Wheezing or Shortness of Breath. Indications: asthma attack, chronic obstructive pulmonary disease 1 Inhaler 3    cloNIDine HCL (CATAPRES) 0.2 mg tablet Take 1 Tab by mouth two (2) times a day. 60 Tab 1    aspirin delayed-release 81 mg tablet Take 1 Tab by mouth daily.  30 Tab 0    ondansetron (ZOFRAN ODT) 4 mg disintegrating tablet Take 1 Tab by mouth every eight (8) hours as needed for Nausea. Indications: prevent nausea and vomiting after surgery 20 Tab 0    famotidine (PEPCID) 20 mg tablet Take 1 Tab by mouth daily. 30 Tab 1    OXYGEN-AIR DELIVERY SYSTEMS 3 L by IntraNASal route as needed for Other (sob).  docusate sodium (COLACE) 100 mg capsule Take 1 Cap by mouth two (2) times a day. 120 Cap 0    fluticasone furoate-vilanterol (BREO ELLIPTA) 200-25 mcg/dose inhaler Take 1 Puff by inhalation daily.  amLODIPine (NORVASC) 10 mg tablet Take 1 Tab by mouth daily. 30 Tab 0    arformoterol (BROVANA) 15 mcg/2 mL nebu neb solution 2 mL by Nebulization route two (2) times a day. 60 Vial 0    budesonide (PULMICORT) 0.5 mg/2 mL nbsp 2 mL by Nebulization route two (2) times a day. 60 Each 0    tiotropium (SPIRIVA) 18 mcg inhalation capsule Take 1 Cap by inhalation daily. 30 Cap 0    atorvastatin (LIPITOR) 20 mg tablet Take 1 Tab by mouth nightly. 30 Tab 0    escitalopram oxalate (LEXAPRO) 10 mg tablet Take 1 Tab by mouth every evening. 30 Tab 0    metoprolol tartrate (LOPRESSOR) 25 mg tablet Take 0.5 Tabs by mouth every twelve (12) hours. 30 Tab 0    roflumilast (DALIRESP) 500 mcg tab tablet Take 1 Tab by mouth daily. 30 Tab 0    OTHER Incentive spirometry- use as directed 1 Each 0    naloxone (NARCAN) 4 mg/actuation nasal spray Use 1 spray intranasally, then discard. Repeat with new spray every 2 min as needed for opioid overdose symptoms, alternating nostrils. 1 Each 0    montelukast (SINGULAIR) 10 mg tablet Take 1 Tab by mouth nightly.  30 Tab 1       Past History     Past Medical History:  Past Medical History:   Diagnosis Date    Asthma     CHF (congestive heart failure) (HCC)     Chronic obstructive pulmonary disease (HCC)     Dependence on supplemental oxygen     Endocrine disease     thyroid issues    Gastrointestinal disorder     \"blockage in my stomach\"    Hypercholesterolemia     Hypertension        Past Surgical History:  Past Surgical History:   Procedure Laterality Date    HX GI      Exploratory laparotomy with lysis of adhesions and primary repair of incarcerated umbilical hernia       Family History:  No family history on file. Social History:  Social History     Tobacco Use    Smoking status: Current Every Day Smoker     Packs/day: 0.25    Smokeless tobacco: Former User   Substance Use Topics    Alcohol use: No     Comment: socially    Drug use: Not Currently     Types: Heroin       Allergies:  No Known Allergies      Review of Systems       Review of Systems   Constitutional: Negative for chills, fatigue and fever. Respiratory: Positive for chest tightness, shortness of breath and wheezing. Negative for cough. Cardiovascular: Positive for chest pain. Negative for palpitations and leg swelling. Gastrointestinal: Negative for abdominal pain, diarrhea, nausea and vomiting. Genitourinary: Negative for dysuria, flank pain, frequency, urgency, vaginal bleeding, vaginal discharge and vaginal pain. Musculoskeletal: Positive for back pain. Negative for neck pain and neck stiffness. Patient states she has sciatica   Skin: Negative for color change and rash. Allergic/Immunologic: Negative for environmental allergies. Neurological: Negative for dizziness, seizures, weakness and light-headedness. Psychiatric/Behavioral: Negative for self-injury, sleep disturbance and suicidal ideas. The patient is not nervous/anxious. Physical Exam     Visit Vitals  /80   Pulse 67   Temp 98.2 °F (36.8 °C)   Resp 12   LMP 04/14/2009   SpO2 99%         Physical Exam  Constitutional:       Appearance: She is well-developed. HENT:      Head: Normocephalic and atraumatic. Eyes:      Extraocular Movements: Extraocular movements intact. Pupils: Pupils are equal, round, and reactive to light. Neck:      Vascular: No hepatojugular reflux or JVD. Trachea: No tracheal deviation.    Cardiovascular:      Rate and Rhythm: Normal rate and regular rhythm. Heart sounds: Normal heart sounds. Pulmonary:      Effort: Pulmonary effort is normal.      Breath sounds: Examination of the right-upper field reveals decreased breath sounds, wheezing and rhonchi. Examination of the left-upper field reveals decreased breath sounds, wheezing and rhonchi. Examination of the right-middle field reveals decreased breath sounds, wheezing and rhonchi. Examination of the left-middle field reveals decreased breath sounds, wheezing and rhonchi. Examination of the right-lower field reveals decreased breath sounds, wheezing and rhonchi. Examination of the left-lower field reveals decreased breath sounds, wheezing and rhonchi. Decreased breath sounds, wheezing and rhonchi present. Comments: Appreciated significant wheezing  Chest:      Chest wall: No tenderness or edema. Abdominal:      General: Bowel sounds are normal.      Palpations: Abdomen is soft. Musculoskeletal:      Right lower leg: She exhibits tenderness. No edema. Left lower leg: She exhibits tenderness. No edema. Skin:     General: Skin is warm and dry. Neurological:      General: No focal deficit present. Mental Status: She is alert and oriented to person, place, and time.            Diagnostic Study Results     Labs -  Recent Results (from the past 12 hour(s))   EKG, 12 LEAD, INITIAL    Collection Time: 05/01/20  1:47 AM   Result Value Ref Range    Ventricular Rate 62 BPM    Atrial Rate 62 BPM    P-R Interval 148 ms    QRS Duration 144 ms    Q-T Interval 470 ms    QTC Calculation (Bezet) 477 ms    Calculated P Axis 69 degrees    Calculated R Axis 11 degrees    Calculated T Axis 107 degrees    Diagnosis       Sinus rhythm with fusion complexes  Left bundle branch block  Abnormal ECG  When compared with ECG of 26-APR-2020 23:50,  fusion complexes are now present  Left bundle branch block is now present     CBC WITH AUTOMATED DIFF    Collection Time: 05/01/20 2: 07 AM   Result Value Ref Range    WBC 7.2 4.6 - 13.2 K/uL    RBC 4.03 (L) 4.20 - 5.30 M/uL    HGB 11.4 (L) 12.0 - 16.0 g/dL    HCT 36.1 35.0 - 45.0 %    MCV 89.6 74.0 - 97.0 FL    MCH 28.3 24.0 - 34.0 PG    MCHC 31.6 31.0 - 37.0 g/dL    RDW 14.7 (H) 11.6 - 14.5 %    PLATELET 104 258 - 637 K/uL    MPV 10.7 9.2 - 11.8 FL    NEUTROPHILS 51 40 - 73 %    LYMPHOCYTES 40 21 - 52 %    MONOCYTES 5 3 - 10 %    EOSINOPHILS 4 0 - 5 %    BASOPHILS 0 0 - 2 %    ABS. NEUTROPHILS 3.6 1.8 - 8.0 K/UL    ABS. LYMPHOCYTES 2.9 0.9 - 3.6 K/UL    ABS. MONOCYTES 0.4 0.05 - 1.2 K/UL    ABS. EOSINOPHILS 0.3 0.0 - 0.4 K/UL    ABS. BASOPHILS 0.0 0.0 - 0.1 K/UL    DF AUTOMATED         Radiologic Studies -   XR CHEST PORT    (Results Pending)         Medical Decision Making   I am the first provider for this patient. I reviewed the vital signs, available nursing notes, past medical history, past surgical history, family history and social history. Vital Signs-Reviewed the patient's vital signs. EKG: Left bundle branch block demonstrated in previous EKGs otherwise no acute hemic changes    Records Reviewed: Nursing Notes and Old Medical Records (Time of Review: 2:30 AM)    ED Course: Progress Notes, Reevaluation, and Consults:         Provider Notes (Medical Decision Making):   MDM  Number of Diagnoses or Management Options  Chest pain, unspecified type:   Diagnosis management comments: Chest pain differential diagnosis includes  ACS  PE  Dissection  Pneumonia  pneumothorax    Chest x-ray unremarkable for pneumothoraces and/or acute infiltrate making pneumothorax and pneumonia unlikely. Clinical signs of pneumonia including productive cough fever chills as well as elevated white count. Patient's chest pain was not noted to be radiating to the back with equal pulses in the upper extremities chest x-ray was also unremarkable for widened mediastinum and patient otherwise appears clinically well, unlikely aortic dissection.   Vitals within normal limits with O2 sat of 99, respiratory rate of 12 and pulse of 67 patient also denies any shortness of breath, unlikely PE. Patient has a recent history of cardiac evaluations including cardiac catheterization which was unremarkable for any high-grade lesions requiring intervention. Patient also was without any dynamic EKG changes including ST segment elevations hyperacute T waves and other signs of ischemia, patient's delta troponin was also negative making ACS unlikely. Consider this may be stable angina however patient symptoms were not associated with exertion. Procedures          Diagnosis     Clinical Impression: No diagnosis found. Disposition:     Follow-up Information    None          Patient's Medications   Start Taking    No medications on file   Continue Taking    ALBUTEROL (ACCUNEB) 1.25 MG/3 ML NEBU    Take 3 mL by inhalation every four (4) hours as needed for Wheezing (wheezing). ALBUTEROL (PROVENTIL HFA, VENTOLIN HFA, PROAIR HFA) 90 MCG/ACTUATION INHALER    Take 2 Puffs by inhalation every four (4) hours as needed for Wheezing or Shortness of Breath. Indications: asthma attack, chronic obstructive pulmonary disease    AMLODIPINE (NORVASC) 10 MG TABLET    Take 1 Tab by mouth daily. ARFORMOTEROL (BROVANA) 15 MCG/2 ML NEBU NEB SOLUTION    2 mL by Nebulization route two (2) times a day. ASPIRIN DELAYED-RELEASE 81 MG TABLET    Take 1 Tab by mouth daily. ATORVASTATIN (LIPITOR) 20 MG TABLET    Take 1 Tab by mouth nightly. BUDESONIDE (PULMICORT) 0.5 MG/2 ML NBSP    2 mL by Nebulization route two (2) times a day. CLONIDINE HCL (CATAPRES) 0.2 MG TABLET    Take 1 Tab by mouth two (2) times a day. DOCUSATE SODIUM (COLACE) 100 MG CAPSULE    Take 1 Cap by mouth two (2) times a day. ESCITALOPRAM OXALATE (LEXAPRO) 10 MG TABLET    Take 1 Tab by mouth every evening. FAMOTIDINE (PEPCID) 20 MG TABLET    Take 1 Tab by mouth daily.     FLUTICASONE FUROATE-VILANTEROL (BREO ELLIPTA) 200-25 MCG/DOSE INHALER    Take 1 Puff by inhalation daily. METOPROLOL TARTRATE (LOPRESSOR) 25 MG TABLET    Take 0.5 Tabs by mouth every twelve (12) hours. MONTELUKAST (SINGULAIR) 10 MG TABLET    Take 1 Tab by mouth nightly. NALOXONE (NARCAN) 4 MG/ACTUATION NASAL SPRAY    Use 1 spray intranasally, then discard. Repeat with new spray every 2 min as needed for opioid overdose symptoms, alternating nostrils. ONDANSETRON (ZOFRAN ODT) 4 MG DISINTEGRATING TABLET    Take 1 Tab by mouth every eight (8) hours as needed for Nausea. Indications: prevent nausea and vomiting after surgery    OTHER    Incentive spirometry- use as directed    OXYGEN-AIR DELIVERY SYSTEMS    3 L by IntraNASal route as needed for Other (sob). ROFLUMILAST (DALIRESP) 500 MCG TAB TABLET    Take 1 Tab by mouth daily. TIOTROPIUM (SPIRIVA) 18 MCG INHALATION CAPSULE    Take 1 Cap by inhalation daily. These Medications have changed    No medications on file   Stop Taking    No medications on file     Disclaimer: Sections of this note are dictated using utilizing voice recognition software. Minor typographical errors may be present. If questions arise, please do not hesitate to contact me or call our department. Ambrosio Mendez 12 Emergency Medicine PGY-1

## 2020-05-02 VITALS
SYSTOLIC BLOOD PRESSURE: 134 MMHG | RESPIRATION RATE: 14 BRPM | HEART RATE: 55 BPM | OXYGEN SATURATION: 99 % | DIASTOLIC BLOOD PRESSURE: 66 MMHG | TEMPERATURE: 98.6 F

## 2020-05-02 LAB
ATRIAL RATE: 73 BPM
CALCULATED P AXIS, ECG09: 83 DEGREES
CALCULATED R AXIS, ECG10: -70 DEGREES
CALCULATED T AXIS, ECG11: 114 DEGREES
DIAGNOSIS, 93000: NORMAL
P-R INTERVAL, ECG05: 158 MS
Q-T INTERVAL, ECG07: 460 MS
QRS DURATION, ECG06: 134 MS
QTC CALCULATION (BEZET), ECG08: 506 MS
VENTRICULAR RATE, ECG03: 73 BPM

## 2020-05-02 PROCEDURE — 74011636637 HC RX REV CODE- 636/637: Performed by: EMERGENCY MEDICINE

## 2020-05-02 PROCEDURE — 74011250637 HC RX REV CODE- 250/637: Performed by: EMERGENCY MEDICINE

## 2020-05-02 RX ORDER — PREDNISONE 50 MG/1
50 TABLET ORAL DAILY
Qty: 5 TAB | Refills: 0 | Status: SHIPPED | OUTPATIENT
Start: 2020-05-02 | End: 2020-05-07

## 2020-05-02 RX ORDER — ALBUTEROL SULFATE 90 UG/1
2 AEROSOL, METERED RESPIRATORY (INHALATION)
Status: COMPLETED | OUTPATIENT
Start: 2020-05-02 | End: 2020-05-02

## 2020-05-02 RX ADMIN — PREDNISONE 50 MG: 10 TABLET ORAL at 02:27

## 2020-05-02 RX ADMIN — ALBUTEROL SULFATE 2 PUFF: 90 AEROSOL, METERED RESPIRATORY (INHALATION) at 02:27

## 2020-05-02 NOTE — ED NOTES
Patient c/o shortness of breath once taken out by [facility] to go home. Patient examined by MD and medicated. Updated discharge instructions and medication reviewed with patient. Patient verbalized understanding. After being medicated patient stated, \"I am ready to go. \"

## 2020-05-02 NOTE — DISCHARGE INSTRUCTIONS
Learning About Opioid Use Disorder  What is opioid use disorder? Opioid use disorder means that a person uses opioids even though it causes harm to themselves or others. It can range from mild to severe. The more signs of this disorder you have, the more severe it may be. Moderate to severe opioid use disorder is sometimes called addiction. People who have it may find it hard to control their use. This disorder can develop from the use of any type of opioid. Prescription ones include hydrocodone, oxycodone, fentanyl, and morphine. Heroin is an example of an illegal opioid. Opioids can be dangerous. Taking too much can cause:  · Trouble breathing. · Low blood pressure. · A low heart rate. · A coma. · Death. Many people with this disorder, and sometimes their families, feel embarrassed or ashamed. Don't let these feelings  the way of getting treatment. Remember that this disorder can happen to anyone who uses opioids, no matter what the reason. What are the signs? You may have opioid use disorder if two or more of the following are true. The more signs of this disorder you have, the more severe it may be. · You use larger amounts of opioids than you ever meant to. Or you've been using them for a longer period of time than you ever meant to. · You can't cut down or control your use. Or you constantly wish you could cut down. · You spend a lot of time getting or using opioids or recovering from the effects. · You have strong cravings for opioids. · You can no longer do your main jobs at work, at school, or at home. · You keep using, even though your opioid use hurts your relationships. · You have stopped doing important activities because of your opioid use. · You use opioids in situations where doing so is dangerous. · You keep using, even though you know it is causing health problems.   · You need more and more of the opioids to get the same effect, or you get less effect from the same amount over time. This is called tolerance. · You have uncomfortable symptoms when you stop using opioids or use less. This is called withdrawal.  If you're taking opioids as part of a supervised care plan, tolerance and withdrawal may not mean that you have opioid use disorder. How is opioid use disorder treated? Treatment usually includes medicines, group therapy, one or more types of counseling, and education. Medicines may be used to help you quit. They may help to control cravings, ease withdrawal symptoms, and prevent relapse. This treatment is called medication-assisted treatment, or MAT. During MAT, you take a medicine (usually methadone or buprenorphine) in place of the opioid you were using. Most people take the medicine for months or years as a part of the treatment, along with therapy or counseling. Treatment focuses on more than opioid use. It helps you cope with the anger, frustration, sadness, and disappointment that often happen when a person tries to stop using opioids. Treatment also looks at other parts of your life, like your relationships with friends and family, school and work, medical problems, and living situation. Treatment helps you find and manage problems. It helps you take control of your life so you don't have to depend on opioids. Opioid use disorder affects your whole family. Family counseling often is part of treatment. Urgent treatment for an overdose  Naloxone is a medicine that reverses the effects of an opioid overdose. If you take it or someone gives it to you soon enough after an overdose, it can save your life. Naloxone comes in a rescue kit you can carry with you. Ask your doctor or pharmacist about having a naloxone rescue kit on hand. Follow-up care is a key part of your treatment and safety. Be sure to make and go to all appointments, and call your doctor if you are having problems.  It's also a good idea to know your test results and keep a list of the medicines you take. Where can you learn more? Go to http://magda-kadie.info/  Enter X744 in the search box to learn more about \"Learning About Opioid Use Disorder. \"  Current as of: August 21, 2019Content Version: 12.4  © 6704-8658 [employer], Incorporated. Care instructions adapted under license by [employer] (which disclaims liability or warranty for this information). If you have questions about a medical condition or this instruction, always ask your healthcare professional. [place] [address] any warranty or liability for your use of this information. Patient Education        Chest Pain: Care Instructions  Your Care Instructions    There are many things that can cause chest pain. Some are not serious and will get better on their own in a few days. But some kinds of chest pain need more testing and treatment. Your doctor may have recommended a follow-up visit in the next 8 to 12 hours. If you are not getting better, you may need more tests or treatment. Even though your doctor has released you, you still need to watch for any problems. The doctor carefully checked you, but sometimes problems can develop later. If you have new symptoms or if your symptoms do not get better, get medical care right away. If you have worse or different chest pain or pressure that lasts more than 5 minutes or you passed out (lost consciousness), call 911 or seek other emergency help right away. A medical visit is only one step in your treatment. Even if you feel better, you still need to do what your doctor recommends, such as going to all suggested follow-up appointments and taking medicines exactly as directed. This will help you recover and help prevent future problems. How can you care for yourself at home? · Rest until you feel better. · Take your medicine exactly as prescribed. Call your doctor if you think you are having a problem with your medicine.   · Do not drive after taking a prescription pain medicine. When should you call for help? Call 911 if:    · You passed out (lost consciousness).     · You have severe difficulty breathing.     · You have symptoms of a heart attack. These may include:  ? Chest pain or pressure, or a strange feeling in your chest.  ? Sweating. ? Shortness of breath. ? Nausea or vomiting. ? Pain, pressure, or a strange feeling in your back, neck, jaw, or upper belly or in one or both shoulders or arms. ? Lightheadedness or sudden weakness. ? A fast or irregular heartbeat. After you call  911, the  may tell you to chew 1 adult-strength or 2 to 4 low-dose aspirin. Wait for an ambulance. Do not try to drive yourself.    Call your doctor today if:    · You have any trouble breathing.     · Your chest pain gets worse.     · You are dizzy or lightheaded, or you feel like you may faint.     · You are not getting better as expected.     · You are having new or different chest pain. Where can you learn more? Go to http://magda-kadie.info/  Enter A120 in the search box to learn more about \"Chest Pain: Care Instructions. \"  Current as of: June 26, 2019Content Version: 12.4  © 6804-2731 [employer], Incorporated. Care instructions adapted under license by [employer] (which disclaims liability or warranty for this information). If you have questions about a medical condition or this instruction, always ask your healthcare professional. [place] [address] any warranty or liability for your use of this information.

## 2020-05-02 NOTE — ED NOTES
Patient urinated on the floor in room stating she is short of breath and when she coughs she urinates. Patient placed on cpox, 100% RA. Patient was was in room sleep prior to complaining of shortness of breath. Patient informed to take slow breaths and try to relax.

## 2020-05-02 NOTE — ED TRIAGE NOTES
Patient arrived via medics from home with chest pain radiating to left arm and shortness of breath. Patient says, \"I had 2 heart attacks this week. \" Patient admits to using meth and heroin today as well.

## 2020-05-02 NOTE — ED PROVIDER NOTES
Yolette Rhodes is a 53 y.o. female with past medical history of COPD, asthma, hypertension, CHF, and CAD coming in complaint of chest pain. Patient was seen and evaluated here last night by myself for chest pain. She had serial negative cardiac enzymes and was discharged this morning. She states that she still had some mild pain when she was discharged. She states that this afternoon around 3-4 PM the pain went down into her left forearm and she felt short of breath as well. She states that she snorted 1 cap of heroin and waited a few hours and decided come back in for evaluation. Patient denies any cough or fever. She denies any exertional symptoms, vomiting, or diaphoresis. She reports some chronic lower extremity swelling. Denies orthopnea or PND. Patient has no other complaints at this time. Past Medical History:   Diagnosis Date    Asthma     CHF (congestive heart failure) (HCC)     Chronic obstructive pulmonary disease (HCC)     Dependence on supplemental oxygen     Endocrine disease     thyroid issues    Gastrointestinal disorder     \"blockage in my stomach\"    Hypercholesterolemia     Hypertension        Past Surgical History:   Procedure Laterality Date    HX GI      Exploratory laparotomy with lysis of adhesions and primary repair of incarcerated umbilical hernia         History reviewed. No pertinent family history.     Social History     Socioeconomic History    Marital status: SINGLE     Spouse name: Not on file    Number of children: Not on file    Years of education: Not on file    Highest education level: Not on file   Occupational History    Not on file   Social Needs    Financial resource strain: Not on file    Food insecurity     Worry: Not on file     Inability: Not on file    Transportation needs     Medical: Not on file     Non-medical: Not on file   Tobacco Use    Smoking status: Current Every Day Smoker     Packs/day: 0.25    Smokeless tobacco: Former User   Substance and Sexual Activity    Alcohol use: No     Comment: socially    Drug use: Not Currently     Types: Heroin    Sexual activity: Never     Comment: last used 9 years ago   Lifestyle    Physical activity     Days per week: Not on file     Minutes per session: Not on file    Stress: Not on file   Relationships    Social connections     Talks on phone: Not on file     Gets together: Not on file     Attends Holiness service: Not on file     Active member of club or organization: Not on file     Attends meetings of clubs or organizations: Not on file     Relationship status: Not on file    Intimate partner violence     Fear of current or ex partner: Not on file     Emotionally abused: Not on file     Physically abused: Not on file     Forced sexual activity: Not on file   Other Topics Concern    Not on file   Social History Narrative    Not on file         ALLERGIES: Patient has no known allergies. Review of Systems   Constitutional: Negative. Negative for chills and fever. HENT: Negative. Negative for congestion and sore throat. Respiratory: Positive for shortness of breath. Negative for cough. Cardiovascular: Positive for chest pain and leg swelling (Chronic). Gastrointestinal: Negative. Negative for abdominal pain, nausea and vomiting. Genitourinary: Negative. Negative for dysuria. Musculoskeletal: Negative for myalgias. Skin: Negative. Negative for rash. Neurological: Negative. Negative for dizziness, weakness and light-headedness. All other systems reviewed and are negative. Vitals:    05/01/20 2045 05/01/20 2114   BP: (!) 184/98 134/75   Pulse: 70 60   Resp: 17 14   Temp:  98.6 °F (37 °C)   SpO2: 97% 99%            Physical Exam  Vitals signs reviewed. Constitutional:       General: She is not in acute distress. Appearance: Normal appearance. She is well-developed. HENT:      Head: Normocephalic and atraumatic.       Mouth/Throat:      Mouth: Mucous membranes are moist.   Eyes:      Extraocular Movements: Extraocular movements intact. Conjunctiva/sclera: Conjunctivae normal.      Pupils: Pupils are equal, round, and reactive to light. Neck:      Musculoskeletal: Normal range of motion and neck supple. Comments: No JVD  Cardiovascular:      Rate and Rhythm: Normal rate and regular rhythm. Heart sounds: S1 normal and S2 normal. No murmur. No friction rub. No gallop. Comments: Reproducible tenderness over the anterior chest wall. No crepitus or deformity. No overlying skin changes. Pulmonary:      Effort: Pulmonary effort is normal. No accessory muscle usage or respiratory distress. Breath sounds: Normal breath sounds. Abdominal:      General: There is no distension. Tenderness: There is no abdominal tenderness. Musculoskeletal: Normal range of motion. General: No tenderness. Comments: Trace pitting edema in the lower extremities bilaterally   Skin:     General: Skin is warm. Findings: No rash. Neurological:      General: No focal deficit present. Mental Status: She is alert and oriented to person, place, and time. Cranial Nerves: No cranial nerve deficit. Sensory: No sensory deficit. Motor: No weakness. Psychiatric:         Speech: Speech normal.          MDM  Number of Diagnoses or Management Options  Atypical chest pain:   Heroin abuse [facility]):   Diagnosis management comments: Daphne Collins is a 53 y.o. female coming in for chest pain. She was just seen a few days and was admitted and had a ACS rule out and was evaluated by cardiology. She came in again last night and had serial negative cardiac enzymes. She states pain continued throughout the day and she did snort some heroin prior to coming in. She has some reproducible chest wall tenderness and continued pain which she has had for some time now. Heart rate is 60 with 90% O2 sats.   Does not appear to be in any respiratory distress. No concern for PE. No concern for aortic pathology or dissection. No neurologic symptoms. No evidence of clinically significant CHF exacerbation. Will repeat patient's EKG and get another set of cardiac enzymes. Patient's EKG is consistent with her left bundle branch block which she has at baseline. Negative Scarbosa criteria. Patient's cardiac enzymes are once again negative. She is now had 3 sets over 24 hours as well as a recent admission with ACS rule out and cardiology evaluation. Had a recent negative cath without any large vessel occlusions. No concern for ACS at this time. Patient's radiating pain has been present for greater than 6 hours and do not feel she needs a second set at this time. She is resting comfortably in bed in no distress throughout her stay here in the emergency department. I advised her to call her cardiologist and primary doctor to follow-up within the next few days. I counseled her extensively on heroin cessation. I gave her return precautions for acutely changing or worsening symptoms and she verbalizes understanding and agrees with this plan.          Procedures      Vitals:  Patient Vitals for the past 12 hrs:   Temp Pulse Resp BP SpO2   05/01/20 2114 98.6 °F (37 °C) 60 14 134/75 99 %   05/01/20 2045  70 17 (!) 184/98 97 %       Medications ordered:   Medications - No data to display      Lab findings:  Recent Results (from the past 12 hour(s))   EKG, 12 LEAD, INITIAL    Collection Time: 05/01/20  8:38 PM   Result Value Ref Range    Ventricular Rate 73 BPM    Atrial Rate 73 BPM    P-R Interval 158 ms    QRS Duration 134 ms    Q-T Interval 460 ms    QTC Calculation (Bezet) 506 ms    Calculated P Axis 83 degrees    Calculated R Axis -70 degrees    Calculated T Axis 114 degrees    Diagnosis       Normal sinus rhythm  Possible Left atrial enlargement  Left axis deviation  Nonspecific intraventricular block  Anterolateral infarct , age undetermined  Abnormal ECG  When compared with ECG of 01-MAY-2020 01:47,  fusion complexes are no longer present  Nonspecific intraventricular block has replaced Left bundle branch block  Anterior infarct is now present  Anterolateral infarct is now present     CARDIAC PANEL,(CK, CKMB & TROPONIN)    Collection Time: 05/01/20  9:10 PM   Result Value Ref Range    CK 52 26 - 192 U/L    CK - MB 1.1 <3.6 ng/ml    CK-MB Index 2.1 0.0 - 4.0 %    Troponin-I, QT <0.02 0.0 - 0.045 NG/ML       EKG interpretation by ED Physician: Sinus rhythm rate of 73 bpm.  Left bundle branch block consistent with prior EKG. QTc 506 ms. Consistent with prior EKGs. Disposition:  Diagnosis:   1. Atypical chest pain    2. Heroin abuse [facility])        Disposition: Discharge    Follow-up Information     Follow up With Specialties Details Why Nima Curran MD Cardiology Schedule an appointment as soon as possible for a visit for office follow up [address]      Eduardo Bougie, NP Nurse Practitioner Schedule an appointment as soon as possible for a visit for office follow up [address] EMERGENCY DEPT Emergency Medicine  As needed, If symptoms worsen [address] [place] [address] [id number] [phone]           Patient's Medications   Start Taking    No medications on file   Continue Taking    ALBUTEROL (ACCUNEB) 1.25 MG/3 ML NEBU    Take 3 mL by inhalation every four (4) hours as needed for Wheezing (wheezing). ALBUTEROL (PROVENTIL HFA, VENTOLIN HFA, PROAIR HFA) 90 MCG/ACTUATION INHALER    Take 2 Puffs by inhalation every four (4) hours as needed for Wheezing or Shortness of Breath. Indications: asthma attack, chronic obstructive pulmonary disease    AMLODIPINE (NORVASC) 10 MG TABLET    Take 1 Tab by mouth daily. ARFORMOTEROL (BROVANA) 15 MCG/2 ML NEBU NEB SOLUTION    2 mL by Nebulization route two (2) times a day.     ASPIRIN DELAYED-RELEASE 81 MG TABLET    Take 1 Tab by mouth daily. ATORVASTATIN (LIPITOR) 20 MG TABLET    Take 1 Tab by mouth nightly. BUDESONIDE (PULMICORT) 0.5 MG/2 ML NBSP    2 mL by Nebulization route two (2) times a day. CLONIDINE HCL (CATAPRES) 0.2 MG TABLET    Take 1 Tab by mouth two (2) times a day. DOCUSATE SODIUM (COLACE) 100 MG CAPSULE    Take 1 Cap by mouth two (2) times a day. ESCITALOPRAM OXALATE (LEXAPRO) 10 MG TABLET    Take 1 Tab by mouth every evening. FAMOTIDINE (PEPCID) 20 MG TABLET    Take 1 Tab by mouth daily. FLUTICASONE FUROATE-VILANTEROL (BREO ELLIPTA) 200-25 MCG/DOSE INHALER    Take 1 Puff by inhalation daily. METOPROLOL TARTRATE (LOPRESSOR) 25 MG TABLET    Take 0.5 Tabs by mouth every twelve (12) hours. MONTELUKAST (SINGULAIR) 10 MG TABLET    Take 1 Tab by mouth nightly. NALOXONE (NARCAN) 4 MG/ACTUATION NASAL SPRAY    Use 1 spray intranasally, then discard. Repeat with new spray every 2 min as needed for opioid overdose symptoms, alternating nostrils. ONDANSETRON (ZOFRAN ODT) 4 MG DISINTEGRATING TABLET    Take 1 Tab by mouth every eight (8) hours as needed for Nausea. Indications: prevent nausea and vomiting after surgery    OTHER    Incentive spirometry- use as directed    OXYGEN-AIR DELIVERY SYSTEMS    3 L by IntraNASal route as needed for Other (sob). ROFLUMILAST (DALIRESP) 500 MCG TAB TABLET    Take 1 Tab by mouth daily. TIOTROPIUM (SPIRIVA) 18 MCG INHALATION CAPSULE    Take 1 Cap by inhalation daily.    These Medications have changed    No medications on file   Stop Taking    No medications on file

## 2020-05-04 ENCOUNTER — PATIENT OUTREACH (OUTPATIENT)
Dept: CASE MANAGEMENT | Age: 55
End: 2020-05-04

## 2020-05-04 NOTE — PROGRESS NOTES
5/4/2020 4:19 PM     CTN attempted to contact patient for Covid Risk. Patient admitted to [facility] on 4/30/2020 and discharged on 5/1/2020 for chest pain. Message left introducing myself, the purpose of the call and giving my contact information. Requested that patient call back at her earliest convenience. This was second attempt to call patient.

## 2020-05-07 ENCOUNTER — PATIENT OUTREACH (OUTPATIENT)
Dept: CASE MANAGEMENT | Age: 55
End: 2020-05-07

## 2020-05-07 NOTE — PROGRESS NOTES
5/7/2020 2:54 PM     CTN attempted to contact patient for Covid Risk. Patient admitted to [facility] on 4/30/2020 and discharged on 5/1/2020 for chest pain. Message left introducing myself, the purpose of the call and giving my contact information. Requested that patient call back at her earliest convenience. This was third attempt to call patient.

## 2020-05-10 ENCOUNTER — PATIENT OUTREACH (OUTPATIENT)
Dept: CASE MANAGEMENT | Age: 55
End: 2020-05-10

## 2020-05-10 ENCOUNTER — APPOINTMENT (OUTPATIENT)
Dept: GENERAL RADIOLOGY | Age: 55
End: 2020-05-10
Attending: EMERGENCY MEDICINE
Payer: MEDICAID

## 2020-05-10 ENCOUNTER — HOSPITAL ENCOUNTER (EMERGENCY)
Age: 55
Discharge: HOME | End: 2020-05-10
Attending: EMERGENCY MEDICINE
Payer: MEDICAID

## 2020-05-10 VITALS
HEART RATE: 66 BPM | WEIGHT: 165 LBS | RESPIRATION RATE: 12 BRPM | BODY MASS INDEX: 33.26 KG/M2 | TEMPERATURE: 98 F | DIASTOLIC BLOOD PRESSURE: 79 MMHG | SYSTOLIC BLOOD PRESSURE: 137 MMHG | HEIGHT: 59 IN | OXYGEN SATURATION: 97 %

## 2020-05-10 DIAGNOSIS — R07.9 ACUTE CHEST PAIN: Primary | ICD-10-CM

## 2020-05-10 DIAGNOSIS — R06.02 SHORTNESS OF BREATH: ICD-10-CM

## 2020-05-10 LAB
ALBUMIN SERPL-MCNC: 4 G/DL (ref 3.4–5)
ALBUMIN/GLOB SERPL: 1.2 {RATIO} (ref 0.8–1.7)
ALP SERPL-CCNC: 84 U/L (ref 45–117)
ALT SERPL-CCNC: 16 U/L (ref 13–56)
ANION GAP SERPL CALC-SCNC: 2 MMOL/L (ref 3–18)
AST SERPL-CCNC: 13 U/L (ref 10–38)
BASOPHILS # BLD: 0 K/UL (ref 0–0.1)
BASOPHILS NFR BLD: 0 % (ref 0–2)
BILIRUB SERPL-MCNC: 0.4 MG/DL (ref 0.2–1)
BUN SERPL-MCNC: 13 MG/DL (ref 7–18)
BUN/CREAT SERPL: 15 (ref 12–20)
CALCIUM SERPL-MCNC: 8.3 MG/DL (ref 8.5–10.1)
CHLORIDE SERPL-SCNC: 112 MMOL/L (ref 100–111)
CK MB CFR SERPL CALC: 2.1 % (ref 0–4)
CK MB SERPL-MCNC: 3 NG/ML (ref 5–25)
CK SERPL-CCNC: 140 U/L (ref 26–192)
CO2 SERPL-SCNC: 31 MMOL/L (ref 21–32)
CREAT SERPL-MCNC: 0.87 MG/DL (ref 0.6–1.3)
DIFFERENTIAL METHOD BLD: NORMAL
EOSINOPHIL # BLD: 0.2 K/UL (ref 0–0.4)
EOSINOPHIL NFR BLD: 3 % (ref 0–5)
ERYTHROCYTE [DISTWIDTH] IN BLOOD BY AUTOMATED COUNT: 14 % (ref 11.6–14.5)
GLOBULIN SER CALC-MCNC: 3.4 G/DL (ref 2–4)
GLUCOSE SERPL-MCNC: 100 MG/DL (ref 74–99)
HCT VFR BLD AUTO: 37.4 % (ref 35–45)
HGB BLD-MCNC: 12.2 G/DL (ref 12–16)
LYMPHOCYTES # BLD: 2.4 K/UL (ref 0.9–3.6)
LYMPHOCYTES NFR BLD: 33 % (ref 21–52)
MCH RBC QN AUTO: 28.7 PG (ref 24–34)
MCHC RBC AUTO-ENTMCNC: 32.6 G/DL (ref 31–37)
MCV RBC AUTO: 88 FL (ref 74–97)
MONOCYTES # BLD: 0.3 K/UL (ref 0.05–1.2)
MONOCYTES NFR BLD: 4 % (ref 3–10)
NEUTS SEG # BLD: 4.5 K/UL (ref 1.8–8)
NEUTS SEG NFR BLD: 60 % (ref 40–73)
PLATELET # BLD AUTO: 211 K/UL (ref 135–420)
PMV BLD AUTO: 10.7 FL (ref 9.2–11.8)
POTASSIUM SERPL-SCNC: 3.7 MMOL/L (ref 3.5–5.5)
PROT SERPL-MCNC: 7.4 G/DL (ref 6.4–8.2)
RBC # BLD AUTO: 4.25 M/UL (ref 4.2–5.3)
SODIUM SERPL-SCNC: 145 MMOL/L (ref 136–145)
TROPONIN I SERPL-MCNC: <0.02 NG/ML (ref 0–0.04)
WBC # BLD AUTO: 7.4 K/UL (ref 4.6–13.2)

## 2020-05-10 PROCEDURE — 80053 COMPREHEN METABOLIC PANEL: CPT

## 2020-05-10 PROCEDURE — 74011000270 HC RX REV CODE- 270: Performed by: EMERGENCY MEDICINE

## 2020-05-10 PROCEDURE — 71045 X-RAY EXAM CHEST 1 VIEW: CPT

## 2020-05-10 PROCEDURE — 99284 EMERGENCY DEPT VISIT MOD MDM: CPT

## 2020-05-10 PROCEDURE — 96374 THER/PROPH/DIAG INJ IV PUSH: CPT

## 2020-05-10 PROCEDURE — 74011250637 HC RX REV CODE- 250/637: Performed by: EMERGENCY MEDICINE

## 2020-05-10 PROCEDURE — 93005 ELECTROCARDIOGRAM TRACING: CPT

## 2020-05-10 PROCEDURE — 74011250636 HC RX REV CODE- 250/636: Performed by: EMERGENCY MEDICINE

## 2020-05-10 PROCEDURE — 85025 COMPLETE CBC W/AUTO DIFF WBC: CPT

## 2020-05-10 PROCEDURE — 82550 ASSAY OF CK (CPK): CPT

## 2020-05-10 PROCEDURE — 94640 AIRWAY INHALATION TREATMENT: CPT

## 2020-05-10 RX ORDER — MORPHINE SULFATE 4 MG/ML
4 INJECTION, SOLUTION INTRAMUSCULAR; INTRAVENOUS
Status: COMPLETED | OUTPATIENT
Start: 2020-05-10 | End: 2020-05-10

## 2020-05-10 RX ORDER — ALBUTEROL SULFATE 90 UG/1
2 AEROSOL, METERED RESPIRATORY (INHALATION)
Status: DISCONTINUED | OUTPATIENT
Start: 2020-05-10 | End: 2020-05-10 | Stop reason: HOSPADM

## 2020-05-10 RX ADMIN — ALBUTEROL SULFATE 2 PUFF: 90 AEROSOL, METERED RESPIRATORY (INHALATION) at 18:27

## 2020-05-10 RX ADMIN — Medication: at 18:27

## 2020-05-10 RX ADMIN — MORPHINE SULFATE 4 MG: 4 INJECTION, SOLUTION INTRAMUSCULAR; INTRAVENOUS at 17:43

## 2020-05-10 NOTE — DISCHARGE INSTRUCTIONS

## 2020-05-10 NOTE — PROGRESS NOTES
5/10/2020 9:34 AM     CTN attempted to contact patient for Covid Risk. Patient admitted to [facility] on 4/30/2020 and discharged on 5/1/2020 for chest pain. Message left introducing myself, the purpose of the call and giving my contact information. Requested that patient call back at her earliest convenience. This was fourth attempt to call patient. Will resolve episode as there have been no return calls from patient.

## 2020-05-10 NOTE — ED TRIAGE NOTES
\"I'm babysitting my grandbaby and I was holding her. When I went to put her down, I had pain to my left side. \"  She received  mg PO and Nitroglycerin 0.4 mg SL PTA by medic.

## 2020-05-10 NOTE — ED PROVIDER NOTES
EMERGENCY DEPARTMENT HISTORY AND PHYSICAL EXAM      Date: 5/10/2020  Patient Name: Ailin Jj    History of Presenting Illness     Chief Complaint   Patient presents with    Chest Pain       History (Context): Ailin Jj is a 53 y.o. Deneice Macfarlan with CHF, COPD, and a complicated set of comorbid conditions as noted below, who is well-known to the department and myself personally, who presents with subacute onset, persistent, severe substernal chest pain. The pain is associated with mild dyspnea. The pain is not associated with diaphoresis. The pain is exacerbated by exertion. On review of systems, the patient denies fever, chills, trauma, back pain, abdominal pain, nausea, vomiting, diaphoresis. PCP: Althea Lee NP    Current Outpatient Medications   Medication Sig Dispense Refill    albuterol (PROVENTIL HFA, VENTOLIN HFA, PROAIR HFA) 90 mcg/actuation inhaler Take 2 Puffs by inhalation every four (4) hours as needed for Wheezing. 1 Inhaler 0    albuterol (ACCUNEB) 1.25 mg/3 mL nebu Take 3 mL by inhalation every four (4) hours as needed for Wheezing (wheezing). 25 Each 0    albuterol (PROVENTIL HFA, VENTOLIN HFA, PROAIR HFA) 90 mcg/actuation inhaler Take 2 Puffs by inhalation every four (4) hours as needed for Wheezing or Shortness of Breath. Indications: asthma attack, chronic obstructive pulmonary disease 1 Inhaler 3    cloNIDine HCL (CATAPRES) 0.2 mg tablet Take 1 Tab by mouth two (2) times a day. 60 Tab 1    aspirin delayed-release 81 mg tablet Take 1 Tab by mouth daily. 30 Tab 0    ondansetron (ZOFRAN ODT) 4 mg disintegrating tablet Take 1 Tab by mouth every eight (8) hours as needed for Nausea. Indications: prevent nausea and vomiting after surgery 20 Tab 0    famotidine (PEPCID) 20 mg tablet Take 1 Tab by mouth daily. 30 Tab 1    OXYGEN-AIR DELIVERY SYSTEMS 3 L by IntraNASal route as needed for Other (sob).       docusate sodium (COLACE) 100 mg capsule Take 1 Cap by mouth two (2) times a day. 120 Cap 0    fluticasone furoate-vilanterol (BREO ELLIPTA) 200-25 mcg/dose inhaler Take 1 Puff by inhalation daily.  amLODIPine (NORVASC) 10 mg tablet Take 1 Tab by mouth daily. 30 Tab 0    arformoterol (BROVANA) 15 mcg/2 mL nebu neb solution 2 mL by Nebulization route two (2) times a day. 60 Vial 0    budesonide (PULMICORT) 0.5 mg/2 mL nbsp 2 mL by Nebulization route two (2) times a day. 60 Each 0    tiotropium (SPIRIVA) 18 mcg inhalation capsule Take 1 Cap by inhalation daily. 30 Cap 0    atorvastatin (LIPITOR) 20 mg tablet Take 1 Tab by mouth nightly. 30 Tab 0    escitalopram oxalate (LEXAPRO) 10 mg tablet Take 1 Tab by mouth every evening. 30 Tab 0    metoprolol tartrate (LOPRESSOR) 25 mg tablet Take 0.5 Tabs by mouth every twelve (12) hours. 30 Tab 0    roflumilast (DALIRESP) 500 mcg tab tablet Take 1 Tab by mouth daily. 30 Tab 0    OTHER Incentive spirometry- use as directed 1 Each 0    naloxone (NARCAN) 4 mg/actuation nasal spray Use 1 spray intranasally, then discard. Repeat with new spray every 2 min as needed for opioid overdose symptoms, alternating nostrils. 1 Each 0    montelukast (SINGULAIR) 10 mg tablet Take 1 Tab by mouth nightly. 30 Tab 1       Past History     Past Medical History:  Past Medical History:   Diagnosis Date    Asthma     CHF (congestive heart failure) (HCC)     Chronic obstructive pulmonary disease (HCC)     Dependence on supplemental oxygen     Endocrine disease     thyroid issues    Gastrointestinal disorder     \"blockage in my stomach\"    Hypercholesterolemia     Hypertension        Past Surgical History:  Past Surgical History:   Procedure Laterality Date    HX GI      Exploratory laparotomy with lysis of adhesions and primary repair of incarcerated umbilical hernia       Family History:  History reviewed. No pertinent family history.     Social History:  Social History     Tobacco Use    Smoking status: Current Every Day Smoker     Packs/day: 0.25    Smokeless tobacco: Former User   Substance Use Topics    Alcohol use: No     Comment: socially    Drug use: Not Currently     Types: Heroin       Allergies:  No Known Allergies    PMH, PSH, family history, social history, allergies reviewed with the patient with significant items noted above. Review of Systems   As per HPI, otherwise reviewed and negative. Physical Exam     Vitals:    05/10/20 1603 05/10/20 1615 05/10/20 1730   BP: 156/61 137/79    Pulse: 70 66    Resp: 12 12    Temp: 98 °F (36.7 °C)     SpO2: 97% 97% 97%   Weight: 74.8 kg (165 lb)     Height: 4' 11\" (1.499 m)         Gen: Well-appearing, in no acute distress   HEENT: Normocephalic, sclera anicteric  Cardiovascular: Normal rate, regular rhythm, no murmurs, rubs, gallops. Pulses intact and equal distally. Pulmonary: No respiratory distress. No stridor. Clear lungs. ABD: Soft, nontender, nondistended. Neuro: Alert. Normal speech. Normal mentation. Psych: Normal thought content and thought processes. : No CVA tenderness  EXT: No edema. Moves all extremities well. No cyanosis or clubbing. Skin: Warm and well-perfused. Other:        Diagnostic Study Results     Labs -     No results found for this or any previous visit (from the past 12 hour(s)). Radiologic Studies -   XR CHEST PORT   Final Result   Impression:   1. No acute process           CT Results  (Last 48 hours)    None        CXR Results  (Last 48 hours)    None            Medical Decision Making   I am the first provider for this patient. I reviewed the vital signs, available nursing notes, past medical history, past surgical history, family history and social history. Vital Signs-Reviewed the patient's vital signs. EKG: Interpreted by myself. Records Reviewed: Personally, on initial evaluation    MDM:   Patient presents with substernal chest pain.   Exam significant for normal exam.   DDX considered: ACS, unstable angina, pneumothorax, GERD, MSK chest pain, anxiety  DDX thought to be less likely but also considered due to high risk condition: Aortic dissection, PE, Boerhaave's, pericarditis, mediastinitis    Patient condition on initial evaluation: Stable    Plan:   Pain Control  Close Observation  Cardiac monitoring  As per orders noted below:  Orders Placed This Encounter    XR CHEST PORT    METABOLIC PANEL, COMPREHENSIVE    CBC WITH AUTOMATED DIFF    CARDIAC PANEL,(CK, CKMB & TROPONIN)    EKG, 12 LEAD, INITIAL    INSERT PERIPHERAL IV ONE TIME STAT    morphine injection 4 mg    DISCONTD: albuterol (PROVENTIL HFA, VENTOLIN HFA, PROAIR HFA) inhaler 2 Puff    DISCONTD: inhalational spacing device        HEART Score 6    Management  Scores 0-3: 0.9-1.7% risk of adverse cardiac event. In the HEART Score study, these patients were discharged (0.99% in the retrospective study, 1.7% in the prospective study)  Scores 4-6: 12-16.6% risk of adverse cardiac event. In the HEART Score study, these patients were admitted to the hospital. (11.6% retrospective, 16.6% prospective)  Scores ? 7: 50-65% risk of adverse cardiac event. In the HEART Score study, these patients were candidates for early invasive measures. (65.2% retrospective, 50.1% prospective)  A MACE (Major Adverse Cardiac Event) was defined as all-cause mortality, myocardial infarction, or coronary revascularization. Critical Actions  Do not use if new ST-segment elevation requiring immediate intervention or clinically unstable patients. A prospective validation of the HEART score for chest pain patients at the emergency department. Crow Krueger, [address] [place] [address], . NEMO Robertson A. Mosterd, Marquis Serrano, HENRIQUE Campos R. Braam, et al.   Int J Cardiol. 2013 Oct 3; 168(3): 61255428. Published online 2013 Mar 7. doi: 10.1016/j.ijcard. 2013.01.255    ED Course:   ED Course as of May 29 0157   Bal Martin May 10, 2020   1736 Reassessment revealed patient chest pain-free. Initial work-up negative. EKG similar to prior. I suspect the patient has intermittent coronary artery vasospasm. The patient has been here multiple times in the past with non-STEMI's. Clean cath. Will discharge home with as needed nitroglycerin. Patient advised she needs to follow-up with PCP and cardiology. [DT]      ED Course User Index  [DT] Gogo Brock MD         Patient condition at time of disposition: Stable  DISCHARGE NOTE:   Pt has been reexamined. Patient has no new complaints, changes, or physical findings. Care plan outlined and precautions discussed. Results were reviewed with the patient. All medications were reviewed with the patient; will d/c home with no changes in meds. All of pt's questions and concerns were addressed. Alarm symptoms and return precautions associated with chief complaint and evaluation were reviewed with the patient in detail. The patient demonstrated adequate understanding. Patient was instructed and agrees to follow up with PCP, as well as to return to the ED upon further deterioration. Patient is ready to go home. The patient is happy with this plan    Follow-up Information     Follow up With Specialties Details Why [address] [facility] EMERGENCY DEPT Emergency Medicine  As needed, If symptoms worsen [address] [facility] [address]  [phone]          Discharge Medication List as of 5/10/2020  5:36 PM          Procedures:  Procedures      Critical Care Time:       Diagnosis     Clinical Impression:   1. Acute chest pain    2. Shortness of breath        Signed,  Brijesh Shearer MD  Emergency Physician  [facility]    As a voice dictation software was utilized to dictate this note, minor word transpositions can occur. I apologize for confusing wording and typographic errors. Please feel free to contact me for clarification.

## 2020-05-11 ENCOUNTER — PATIENT OUTREACH (OUTPATIENT)
Dept: CASE MANAGEMENT | Age: 55
End: 2020-05-11

## 2020-05-11 LAB
ATRIAL RATE: 74 BPM
CALCULATED P AXIS, ECG09: 76 DEGREES
CALCULATED R AXIS, ECG10: -35 DEGREES
CALCULATED T AXIS, ECG11: 100 DEGREES
DIAGNOSIS, 93000: NORMAL
P-R INTERVAL, ECG05: 144 MS
Q-T INTERVAL, ECG07: 450 MS
QRS DURATION, ECG06: 142 MS
QTC CALCULATION (BEZET), ECG08: 499 MS
VENTRICULAR RATE, ECG03: 74 BPM

## 2020-05-12 ENCOUNTER — HOSPITAL ENCOUNTER (EMERGENCY)
Age: 55
Discharge: HOME | End: 2020-05-12
Attending: EMERGENCY MEDICINE
Payer: MEDICAID

## 2020-05-12 ENCOUNTER — APPOINTMENT (OUTPATIENT)
Dept: GENERAL RADIOLOGY | Age: 55
End: 2020-05-12
Attending: EMERGENCY MEDICINE
Payer: MEDICAID

## 2020-05-12 ENCOUNTER — PATIENT OUTREACH (OUTPATIENT)
Dept: CASE MANAGEMENT | Age: 55
End: 2020-05-12

## 2020-05-12 VITALS
TEMPERATURE: 97.7 F | RESPIRATION RATE: 16 BRPM | OXYGEN SATURATION: 100 % | DIASTOLIC BLOOD PRESSURE: 55 MMHG | BODY MASS INDEX: 32.32 KG/M2 | WEIGHT: 160 LBS | HEART RATE: 72 BPM | SYSTOLIC BLOOD PRESSURE: 135 MMHG

## 2020-05-12 DIAGNOSIS — J44.9 CHRONIC OBSTRUCTIVE PULMONARY DISEASE, UNSPECIFIED COPD TYPE (HCC): Primary | ICD-10-CM

## 2020-05-12 LAB
ANION GAP SERPL CALC-SCNC: 4 MMOL/L (ref 3–18)
ATRIAL RATE: 60 BPM
ATRIAL RATE: 63 BPM
ATRIAL RATE: 63 BPM
BASOPHILS # BLD: 0 K/UL (ref 0–0.1)
BASOPHILS NFR BLD: 0 % (ref 0–2)
BUN SERPL-MCNC: 12 MG/DL (ref 7–18)
BUN/CREAT SERPL: 12 (ref 12–20)
CALCIUM SERPL-MCNC: 7.9 MG/DL (ref 8.5–10.1)
CALCULATED P AXIS, ECG09: 46 DEGREES
CALCULATED P AXIS, ECG09: 47 DEGREES
CALCULATED P AXIS, ECG09: 50 DEGREES
CALCULATED R AXIS, ECG10: -12 DEGREES
CALCULATED R AXIS, ECG10: -26 DEGREES
CALCULATED R AXIS, ECG10: -31 DEGREES
CALCULATED T AXIS, ECG11: 104 DEGREES
CALCULATED T AXIS, ECG11: 94 DEGREES
CALCULATED T AXIS, ECG11: 95 DEGREES
CHLORIDE SERPL-SCNC: 113 MMOL/L (ref 100–111)
CO2 SERPL-SCNC: 30 MMOL/L (ref 21–32)
CREAT SERPL-MCNC: 0.97 MG/DL (ref 0.6–1.3)
DIAGNOSIS, 93000: NORMAL
DIFFERENTIAL METHOD BLD: ABNORMAL
EOSINOPHIL # BLD: 0.2 K/UL (ref 0–0.4)
EOSINOPHIL NFR BLD: 4 % (ref 0–5)
ERYTHROCYTE [DISTWIDTH] IN BLOOD BY AUTOMATED COUNT: 14.2 % (ref 11.6–14.5)
GLUCOSE SERPL-MCNC: 104 MG/DL (ref 74–99)
HCT VFR BLD AUTO: 35.2 % (ref 35–45)
HGB BLD-MCNC: 11.3 G/DL (ref 12–16)
LYMPHOCYTES # BLD: 1.6 K/UL (ref 0.9–3.6)
LYMPHOCYTES NFR BLD: 25 % (ref 21–52)
MCH RBC QN AUTO: 28.6 PG (ref 24–34)
MCHC RBC AUTO-ENTMCNC: 32.1 G/DL (ref 31–37)
MCV RBC AUTO: 89.1 FL (ref 74–97)
MONOCYTES # BLD: 0.4 K/UL (ref 0.05–1.2)
MONOCYTES NFR BLD: 6 % (ref 3–10)
NEUTS SEG # BLD: 4.2 K/UL (ref 1.8–8)
NEUTS SEG NFR BLD: 65 % (ref 40–73)
P-R INTERVAL, ECG05: 142 MS
P-R INTERVAL, ECG05: 144 MS
P-R INTERVAL, ECG05: 150 MS
PLATELET # BLD AUTO: 187 K/UL (ref 135–420)
PMV BLD AUTO: 11.2 FL (ref 9.2–11.8)
POTASSIUM SERPL-SCNC: 4.1 MMOL/L (ref 3.5–5.5)
Q-T INTERVAL, ECG07: 478 MS
Q-T INTERVAL, ECG07: 480 MS
Q-T INTERVAL, ECG07: 492 MS
QRS DURATION, ECG06: 140 MS
QRS DURATION, ECG06: 140 MS
QRS DURATION, ECG06: 144 MS
QTC CALCULATION (BEZET), ECG08: 489 MS
QTC CALCULATION (BEZET), ECG08: 491 MS
QTC CALCULATION (BEZET), ECG08: 492 MS
RBC # BLD AUTO: 3.95 M/UL (ref 4.2–5.3)
SODIUM SERPL-SCNC: 147 MMOL/L (ref 136–145)
TROPONIN I SERPL-MCNC: <0.02 NG/ML (ref 0–0.04)
TROPONIN I SERPL-MCNC: <0.02 NG/ML (ref 0–0.04)
VENTRICULAR RATE, ECG03: 60 BPM
VENTRICULAR RATE, ECG03: 63 BPM
VENTRICULAR RATE, ECG03: 63 BPM
WBC # BLD AUTO: 6.5 K/UL (ref 4.6–13.2)

## 2020-05-12 PROCEDURE — 99284 EMERGENCY DEPT VISIT MOD MDM: CPT

## 2020-05-12 PROCEDURE — 80048 BASIC METABOLIC PNL TOTAL CA: CPT

## 2020-05-12 PROCEDURE — 71045 X-RAY EXAM CHEST 1 VIEW: CPT

## 2020-05-12 PROCEDURE — 93005 ELECTROCARDIOGRAM TRACING: CPT

## 2020-05-12 PROCEDURE — 85025 COMPLETE CBC W/AUTO DIFF WBC: CPT

## 2020-05-12 PROCEDURE — 84484 ASSAY OF TROPONIN QUANT: CPT

## 2020-05-12 NOTE — ED TRIAGE NOTES
Pt presents via medic c/o wheezing. Hx of copd. Medics transferred pt to stretcher, pt closed her eyes and rolled over and stated \"im going to sleep\"  Placed pt on cardiac monitor. resp even, clear and unlabored. VSS. Pt is able to speak in full sentences without difficult. States \"im glad you woke me up.   I was about to have a bad dream\"

## 2020-05-12 NOTE — PROGRESS NOTES
Patient was discharged from the Emergency Room and when walking out she stopped at the security desk and stated she was [place] a hard time breathing\".  Keyla Antonio got the patient a wheelchair and asked that I take a look at the patient. This nurse called back to have a PA or MD come to the front to assess the patient. The patient stated that she did not want to stay any longer and that we \"had not done anything for her this whole time\". Patient was taken to her vehicle by the  in a wheel chair.

## 2020-05-12 NOTE — ED PROVIDER NOTES
EMERGENCY DEPARTMENT HISTORY AND PHYSICAL EXAM    1:41 AM      Date: 5/12/2020  Patient Name: Juanpablo Franklin    History of Presenting Illness     Chief Complaint   Patient presents with    Wheezing         History Provided By: Patient  Location/Duration/Severity/Modifying factors   54YOF with a  PMH of HTN, COPD, CHF presents to the ER for evaluation of chest pain and shortness of breath. She reports that symptoms developed after getting out of the shower this evening. Described as a pressure. Symptoms are similar to her recent evaluation 2 days ago, but not as severe. Did not take medications to improve symptoms, but symptoms gradually improved on their own. No fever, cough, cold symptoms, NVD, abdominal pain. Wheezing    Pertinent negatives include no fever, no vomiting, no diarrhea, no dysuria, no headaches and no sore throat. PCP: Lane Stanley NP    Current Outpatient Medications   Medication Sig Dispense Refill    albuterol (ACCUNEB) 1.25 mg/3 mL nebu Take 3 mL by inhalation every four (4) hours as needed for Wheezing (wheezing). 25 Each 0    albuterol (PROVENTIL HFA, VENTOLIN HFA, PROAIR HFA) 90 mcg/actuation inhaler Take 2 Puffs by inhalation every four (4) hours as needed for Wheezing or Shortness of Breath. Indications: asthma attack, chronic obstructive pulmonary disease 1 Inhaler 3    cloNIDine HCL (CATAPRES) 0.2 mg tablet Take 1 Tab by mouth two (2) times a day. 60 Tab 1    aspirin delayed-release 81 mg tablet Take 1 Tab by mouth daily. 30 Tab 0    ondansetron (ZOFRAN ODT) 4 mg disintegrating tablet Take 1 Tab by mouth every eight (8) hours as needed for Nausea. Indications: prevent nausea and vomiting after surgery 20 Tab 0    famotidine (PEPCID) 20 mg tablet Take 1 Tab by mouth daily. 30 Tab 1    OXYGEN-AIR DELIVERY SYSTEMS 3 L by IntraNASal route as needed for Other (sob).  docusate sodium (COLACE) 100 mg capsule Take 1 Cap by mouth two (2) times a day.  120 Cap 0    fluticasone furoate-vilanterol (BREO ELLIPTA) 200-25 mcg/dose inhaler Take 1 Puff by inhalation daily.  amLODIPine (NORVASC) 10 mg tablet Take 1 Tab by mouth daily. 30 Tab 0    arformoterol (BROVANA) 15 mcg/2 mL nebu neb solution 2 mL by Nebulization route two (2) times a day. 60 Vial 0    budesonide (PULMICORT) 0.5 mg/2 mL nbsp 2 mL by Nebulization route two (2) times a day. 60 Each 0    tiotropium (SPIRIVA) 18 mcg inhalation capsule Take 1 Cap by inhalation daily. 30 Cap 0    atorvastatin (LIPITOR) 20 mg tablet Take 1 Tab by mouth nightly. 30 Tab 0    escitalopram oxalate (LEXAPRO) 10 mg tablet Take 1 Tab by mouth every evening. 30 Tab 0    metoprolol tartrate (LOPRESSOR) 25 mg tablet Take 0.5 Tabs by mouth every twelve (12) hours. 30 Tab 0    roflumilast (DALIRESP) 500 mcg tab tablet Take 1 Tab by mouth daily. 30 Tab 0    OTHER Incentive spirometry- use as directed 1 Each 0    naloxone (NARCAN) 4 mg/actuation nasal spray Use 1 spray intranasally, then discard. Repeat with new spray every 2 min as needed for opioid overdose symptoms, alternating nostrils. 1 Each 0    montelukast (SINGULAIR) 10 mg tablet Take 1 Tab by mouth nightly. 30 Tab 1       Past History     Past Medical History:  Past Medical History:   Diagnosis Date    Asthma     CHF (congestive heart failure) (HCC)     Chronic obstructive pulmonary disease (HCC)     Dependence on supplemental oxygen     Endocrine disease     thyroid issues    Gastrointestinal disorder     \"blockage in my stomach\"    Hypercholesterolemia     Hypertension        Past Surgical History:  Past Surgical History:   Procedure Laterality Date    HX GI      Exploratory laparotomy with lysis of adhesions and primary repair of incarcerated umbilical hernia       Family History:  No family history on file.     Social History:  Social History     Tobacco Use    Smoking status: Current Every Day Smoker     Packs/day: 0.25    Smokeless tobacco: Former User Substance Use Topics    Alcohol use: No     Comment: socially    Drug use: Not Currently     Types: Heroin       Allergies:  No Known Allergies      Review of Systems       Review of Systems   Constitutional: Negative for chills and fever. HENT: Negative for congestion and sore throat. Eyes: Negative for visual disturbance. Respiratory: Positive for wheezing. Cardiovascular: Negative for palpitations. Gastrointestinal: Negative for diarrhea, nausea and vomiting. Endocrine: Negative for polyuria. Genitourinary: Negative for dysuria. Musculoskeletal: Negative for back pain. Allergic/Immunologic: Negative for immunocompromised state. Neurological: Negative for headaches. Psychiatric/Behavioral: The patient is not nervous/anxious. Physical Exam     Visit Vitals  /55 (BP 1 Location: Left arm, BP Patient Position: At rest)   Pulse 72   Temp 97.7 °F (36.5 °C)   Resp 16   Wt 72.6 kg (160 lb)   LMP 04/14/2009   SpO2 100%   BMI 32.32 kg/m²       Physical Exam  Vitals signs and nursing note reviewed. Constitutional:       Appearance: Normal appearance. HENT:      Head: Normocephalic. Mouth/Throat:      Mouth: Mucous membranes are moist.   Cardiovascular:      Rate and Rhythm: Normal rate and regular rhythm. Heart sounds: Normal heart sounds. No murmur. No gallop. Pulmonary:      Effort: Pulmonary effort is normal. No respiratory distress. Breath sounds: Wheezing (Mild end expiratory wheeze) present. Abdominal:      General: Abdomen is flat. There is no distension. Palpations: Abdomen is soft. Tenderness: There is no abdominal tenderness. There is no guarding. Musculoskeletal:      Right lower leg: No edema. Left lower leg: No edema. Skin:     General: Skin is warm. Capillary Refill: Capillary refill takes less than 2 seconds. Neurological:      General: No focal deficit present. Mental Status: She is alert.    Psychiatric: Mood and Affect: Mood normal.         Behavior: Behavior normal.           Diagnostic Study Results     Labs -  Recent Results (from the past 12 hour(s))   EKG, 12 LEAD, INITIAL    Collection Time: 05/12/20  2:02 AM   Result Value Ref Range    Ventricular Rate 63 BPM    Atrial Rate 63 BPM    P-R Interval 142 ms    QRS Duration 144 ms    Q-T Interval 478 ms    QTC Calculation (Bezet) 489 ms    Calculated P Axis 46 degrees    Calculated R Axis -12 degrees    Calculated T Axis 104 degrees    Diagnosis       Normal sinus rhythm  Left bundle branch block  Abnormal ECG  When compared with ECG of 10-MAY-2020 15:40,  No significant change was found     CBC WITH AUTOMATED DIFF    Collection Time: 05/12/20  2:15 AM   Result Value Ref Range    WBC 6.5 4.6 - 13.2 K/uL    RBC 3.95 (L) 4.20 - 5.30 M/uL    HGB 11.3 (L) 12.0 - 16.0 g/dL    HCT 35.2 35.0 - 45.0 %    MCV 89.1 74.0 - 97.0 FL    MCH 28.6 24.0 - 34.0 PG    MCHC 32.1 31.0 - 37.0 g/dL    RDW 14.2 11.6 - 14.5 %    PLATELET 631 690 - 740 K/uL    MPV 11.2 9.2 - 11.8 FL    NEUTROPHILS 65 40 - 73 %    LYMPHOCYTES 25 21 - 52 %    MONOCYTES 6 3 - 10 %    EOSINOPHILS 4 0 - 5 %    BASOPHILS 0 0 - 2 %    ABS. NEUTROPHILS 4.2 1.8 - 8.0 K/UL    ABS. LYMPHOCYTES 1.6 0.9 - 3.6 K/UL    ABS. MONOCYTES 0.4 0.05 - 1.2 K/UL    ABS. EOSINOPHILS 0.2 0.0 - 0.4 K/UL    ABS.  BASOPHILS 0.0 0.0 - 0.1 K/UL    DF AUTOMATED     METABOLIC PANEL, BASIC    Collection Time: 05/12/20  2:15 AM   Result Value Ref Range    Sodium 147 (H) 136 - 145 mmol/L    Potassium 4.1 3.5 - 5.5 mmol/L    Chloride 113 (H) 100 - 111 mmol/L    CO2 30 21 - 32 mmol/L    Anion gap 4 3.0 - 18 mmol/L    Glucose 104 (H) 74 - 99 mg/dL    BUN 12 7.0 - 18 MG/DL    Creatinine 0.97 0.6 - 1.3 MG/DL    BUN/Creatinine ratio 12 12 - 20      GFR est AA >60 >60 ml/min/1.73m2    GFR est non-AA 60 (L) >60 ml/min/1.73m2    Calcium 7.9 (L) 8.5 - 10.1 MG/DL   TROPONIN I    Collection Time: 05/12/20  2:15 AM   Result Value Ref Range    Troponin-I, QT <0.02 0.0 - 0.045 NG/ML       Radiologic Studies -   XR CHEST PORT   Final Result   Impression:   1. Central bronchial wall thickening and streaky atelectasis. Medical Decision Making   I am the first provider for this patient. I reviewed the vital signs, available nursing notes, past medical history, past surgical history, family history and social history. Vital Signs-Reviewed the patient's vital signs. EK:02AM, NSR, LBBB, does not meet Sgarbossa criteria    Records Reviewed: Nursing Notes, Old Medical Records and Previous electrocardiograms (Time of Review: 1:41 AM)    ED Course: Progress Notes, Reevaluation, and Consults:         Provider Notes (Medical Decision Making):   MDM  Number of Diagnoses or Management Options  Diagnosis management comments: 75PNS with a PMH of CHF, COPD, HTN presents to the ER for evaluation of chest pain and shortness of breath. Did not mention the wheezing component as reported by EMS/nursing staff. Vital signs WNL; afebrile. Physical exam reveals a benign cardiac exam and mild expiratory wheeze on pulmonary exam. Otherwise nonfocal exam. EKG reveals LBBB that does not meet Sgarbossa's criteria for ischemia. CXR reveals bronchial thickening. Labs reveal normal blood counts, electrolytes. Troponin negative. Given recurrence of symptoms, no clinical concern for PE at this time. Patient developed subjective respiratory distress with a benign pulmonary exam. Treated with 8 pumps of her own Albuterol. Delta troponin pending. Dr. Bay Us to follow-up on delta troponin and determine disposition. Diagnosis     Clinical Impression:   1.  Chronic obstructive pulmonary disease, unspecified COPD type ([facility] [address]        Disposition: Pending    Follow-up Information    None          Patient's Medications   Start Taking    No medications on file   Continue Taking    ALBUTEROL (ACCUNEB) 1.25 MG/3 ML NEBU    Take 3 mL by inhalation every four (4) hours as needed for Wheezing (wheezing). ALBUTEROL (PROVENTIL HFA, VENTOLIN HFA, PROAIR HFA) 90 MCG/ACTUATION INHALER    Take 2 Puffs by inhalation every four (4) hours as needed for Wheezing or Shortness of Breath. Indications: asthma attack, chronic obstructive pulmonary disease    AMLODIPINE (NORVASC) 10 MG TABLET    Take 1 Tab by mouth daily. ARFORMOTEROL (BROVANA) 15 MCG/2 ML NEBU NEB SOLUTION    2 mL by Nebulization route two (2) times a day. ASPIRIN DELAYED-RELEASE 81 MG TABLET    Take 1 Tab by mouth daily. ATORVASTATIN (LIPITOR) 20 MG TABLET    Take 1 Tab by mouth nightly. BUDESONIDE (PULMICORT) 0.5 MG/2 ML NBSP    2 mL by Nebulization route two (2) times a day. CLONIDINE HCL (CATAPRES) 0.2 MG TABLET    Take 1 Tab by mouth two (2) times a day. DOCUSATE SODIUM (COLACE) 100 MG CAPSULE    Take 1 Cap by mouth two (2) times a day. ESCITALOPRAM OXALATE (LEXAPRO) 10 MG TABLET    Take 1 Tab by mouth every evening. FAMOTIDINE (PEPCID) 20 MG TABLET    Take 1 Tab by mouth daily. FLUTICASONE FUROATE-VILANTEROL (BREO ELLIPTA) 200-25 MCG/DOSE INHALER    Take 1 Puff by inhalation daily. METOPROLOL TARTRATE (LOPRESSOR) 25 MG TABLET    Take 0.5 Tabs by mouth every twelve (12) hours. MONTELUKAST (SINGULAIR) 10 MG TABLET    Take 1 Tab by mouth nightly. NALOXONE (NARCAN) 4 MG/ACTUATION NASAL SPRAY    Use 1 spray intranasally, then discard. Repeat with new spray every 2 min as needed for opioid overdose symptoms, alternating nostrils. ONDANSETRON (ZOFRAN ODT) 4 MG DISINTEGRATING TABLET    Take 1 Tab by mouth every eight (8) hours as needed for Nausea. Indications: prevent nausea and vomiting after surgery    OTHER    Incentive spirometry- use as directed    OXYGEN-AIR DELIVERY SYSTEMS    3 L by IntraNASal route as needed for Other (sob). ROFLUMILAST (DALIRESP) 500 MCG TAB TABLET    Take 1 Tab by mouth daily. TIOTROPIUM (SPIRIVA) 18 MCG INHALATION CAPSULE    Take 1 Cap by inhalation daily.    These Medications have changed    No medications on file   Stop Taking    No medications on file       Tobias Phoenix MD  Emergency Medicine PGY-2  [facility] Emergency Department  5/12/2020, 7:04AM        I have reviewed the chart and agree with the documentation recorded by the resident, including the assessment, treatment plan, and disposition. I performed a history and physical examination of the patient and discussed the management with the resident.        Tamara Barrios MD

## 2020-05-12 NOTE — ED NOTES
Called by Ashtyn Barger RN from triage about this patient. She states the patient walked out front after being discharged and became short of breath and asked me to come and talk to the patient. By the time I walked out front to ask her to stay so her provider could evaluate her further the patient had already left and informed the RN she did not want to be evaluated any further.     DARCIE Carlson 9:31 AM

## 2020-05-12 NOTE — DISCHARGE INSTRUCTIONS
Patient Education        Using a Metered-Dose Inhaler: Care Instructions  Your Care Instructions    A metered-dose inhaler lets you breathe medicine into your lungs quickly. Inhaled medicine works faster than the same medicine in a pill. An inhaler allows you to take less medicine than you would need if you took it as a pill. \"Metered-dose\" means that the inhaler gives a measured amount of medicine each time you use it. A metered-dose inhaler gives medicine in the form of a liquid mist.  Your doctor may want you to use a spacer with your inhaler. A spacer is a chamber that you attach to the inhaler. The chamber holds the medicine before you inhale it. That way, you can inhale the medicine in as many breaths as you need. Doctors recommend using a spacer with most metered-dose inhalers, especially those with corticosteroid medicines. Follow-up care is a key part of your treatment and safety. Be sure to make and go to all appointments, and call your doctor if you are having problems. It's also a good idea to know your test results and keep a list of the medicines you take. How can you care for yourself at home? To get started using your inhaler  · Talk with your health care provider to be sure you are using your inhaler the right way. It might help if you practice using it in front of a mirror. Use the inhaler exactly as prescribed. · Check that you have the correct medicine. If you use more than one inhaler, put a label on each one. This will let you know which one to use at the right time. · Keep track of how much medicine is in the inhaler. Check the label to see how many doses are in the container. If you know how many puffs you can take, you can replace the inhaler before you run out. Ask your health care provider how you can keep track of how much medicine is left. · Use a spacer if you have problems pressing the inhaler and breathing in at the same time.  You also may need a spacer if you are using corticosteroid medicines. · If you are using a corticosteroid inhaler, gargle and rinse out your mouth with water after use. Do not swallow the water. Swallowing the water will increase the chance that the medicine will get into your bloodstream. This may make it more likely that you will have side effects. To use a spacer with an inhaler  1. Shake the inhaler. Remove the inhaler cap, and place the mouthpiece of the inhaler into the spacer. Check the inhaler instructions to see if you need to prime your inhaler before you use it. If it needs priming, follow the instructions on how to prime your inhaler. 2. Remove the cap from the spacer. 3. Hold the inhaler upright with the mouthpiece at the bottom. 4. Tilt your head back a little, and breathe out slowly and completely. 5. Place the spacer's mouthpiece in your mouth. 6. Press down on the inhaler to spray one puff of medicine into the spacer, and then start breathing in slowly. Wait to inhale until after you have pressed down on the inhaler. Some spacers have a whistle. If you hear it, you should breathe in more slowly. 7. Hold your breath for 10 seconds. This will let the medicine settle in your lungs. 8. If you need to take a second dose, wait 30 to 60 seconds to allow the inhaler valve to refill. To use an inhaler without a spacer  1. Shake the inhaler as directed. Remove the cap. Check the instructions to see if you need to prime your inhaler before you use it. If it needs priming, follow the instructions on how to prime your inhaler. 2. Hold the inhaler upright with the mouthpiece at the bottom. 3. Tilt your head back a little, and breathe out slowly and completely. 4. Position the inhaler in one of two ways:  ? You can place the inhaler in your mouth. This is easier for most people. And it lowers the risk that any of the medicine will get into your eyes.   ? Or you can place the inhaler 1 to 2 inches in front of your open mouth, without closing your lips over it. Try to open your mouth as wide as you can. Placing the inhaler in front of your open mouth may be better for getting the medicine into your lungs. But some people may find this too hard to do. 5. Start taking slow, even breaths through your mouth. Press down on the inhaler once, then inhale fully. 6. Hold your breath for 10 seconds. This will let the medicine settle in your lungs. 7. If you need to take a second dose, wait 30 to 60 seconds to allow the inhaler valve to refill. Where can you learn more? Go to http://magda-kadie.info/  Enter K111 in the search box to learn more about \"Using a Metered-Dose Inhaler: Care Instructions. \"  Current as of: June 9, 2019Content Version: 12.4  © 6134-1337 [employer]. Care instructions adapted under license by [employer] (which disclaims liability or warranty for this information). If you have questions about a medical condition or this instruction, always ask your healthcare professional. [address] any warranty or liability for your use of this information.

## 2020-05-12 NOTE — ED NOTES
Reentered pt room to attempt repeat trop and ekg again pt states \"who are you? I need to write your ass up. I been calling on the call bell and you didn't come. I want to talk to somebody about you. You going to get in trouble\"  Requested that pt allow this nurse to repeat her ekg and troponin, pt then states \"well I guess.   Im just tired of all yall doctors\"

## 2020-05-12 NOTE — ED NOTES
Entered pt room to do repeat ekg and repeat trop, pt soundly sleeping in NAD. resp even and unlabored. Called pt name several times, pt woke up and stated \"I need to go to the bathroom before you do anything. Pt ambulated to bathroom without assistance, pt returned and states \"I dont think you need to do a ekg or blood right now. Go tell the doctor I cant breathe. \"  Provider made aware of pt comments. Pt able to speak in complete sentences without difficulty.

## 2020-05-13 ENCOUNTER — PATIENT OUTREACH (OUTPATIENT)
Dept: CASE MANAGEMENT | Age: 55
End: 2020-05-13

## 2020-05-13 NOTE — PROGRESS NOTES
ACM attempted to contact patient at listed number for COVID -19 screening following discharge from ED/Hospital. VM left identifying self. Direct contact information given with request for return call. Episode resolved.

## 2020-05-24 ENCOUNTER — HOSPITAL ENCOUNTER (EMERGENCY)
Age: 55
Discharge: HOME | End: 2020-05-25
Attending: EMERGENCY MEDICINE
Payer: MEDICAID

## 2020-05-24 ENCOUNTER — APPOINTMENT (OUTPATIENT)
Dept: GENERAL RADIOLOGY | Age: 55
End: 2020-05-24
Attending: EMERGENCY MEDICINE
Payer: MEDICAID

## 2020-05-24 DIAGNOSIS — R07.9 ACUTE CHEST PAIN: Primary | ICD-10-CM

## 2020-05-24 DIAGNOSIS — R03.0 ELEVATED BLOOD PRESSURE READING: ICD-10-CM

## 2020-05-24 LAB
ANION GAP SERPL CALC-SCNC: 5 MMOL/L (ref 3–18)
BASOPHILS # BLD: 0 K/UL (ref 0–0.1)
BASOPHILS NFR BLD: 0 % (ref 0–2)
BUN SERPL-MCNC: 12 MG/DL (ref 7–18)
BUN/CREAT SERPL: 14 (ref 12–20)
CALCIUM SERPL-MCNC: 8.5 MG/DL (ref 8.5–10.1)
CHLORIDE SERPL-SCNC: 109 MMOL/L (ref 100–111)
CK MB CFR SERPL CALC: 2.3 % (ref 0–4)
CK MB SERPL-MCNC: 2 NG/ML (ref 5–25)
CK SERPL-CCNC: 88 U/L (ref 26–192)
CO2 SERPL-SCNC: 29 MMOL/L (ref 21–32)
CREAT SERPL-MCNC: 0.87 MG/DL (ref 0.6–1.3)
DIFFERENTIAL METHOD BLD: ABNORMAL
EOSINOPHIL # BLD: 0.3 K/UL (ref 0–0.4)
EOSINOPHIL NFR BLD: 4 % (ref 0–5)
ERYTHROCYTE [DISTWIDTH] IN BLOOD BY AUTOMATED COUNT: 13.4 % (ref 11.6–14.5)
GLUCOSE SERPL-MCNC: 107 MG/DL (ref 74–99)
HCT VFR BLD AUTO: 35.8 % (ref 35–45)
HGB BLD-MCNC: 11.9 G/DL (ref 12–16)
INR PPP: 1.1 (ref 0.8–1.2)
LIPASE SERPL-CCNC: 47 U/L (ref 73–393)
LYMPHOCYTES # BLD: 1.7 K/UL (ref 0.9–3.6)
LYMPHOCYTES NFR BLD: 25 % (ref 21–52)
MCH RBC QN AUTO: 29.1 PG (ref 24–34)
MCHC RBC AUTO-ENTMCNC: 33.2 G/DL (ref 31–37)
MCV RBC AUTO: 87.5 FL (ref 74–97)
MONOCYTES # BLD: 0.3 K/UL (ref 0.05–1.2)
MONOCYTES NFR BLD: 4 % (ref 3–10)
NEUTS SEG # BLD: 4.6 K/UL (ref 1.8–8)
NEUTS SEG NFR BLD: 67 % (ref 40–73)
PLATELET # BLD AUTO: 212 K/UL (ref 135–420)
PMV BLD AUTO: 11 FL (ref 9.2–11.8)
POTASSIUM SERPL-SCNC: 3.5 MMOL/L (ref 3.5–5.5)
PROTHROMBIN TIME: 14.3 SEC (ref 11.5–15.2)
RBC # BLD AUTO: 4.09 M/UL (ref 4.2–5.3)
SODIUM SERPL-SCNC: 143 MMOL/L (ref 136–145)
TROPONIN I SERPL-MCNC: <0.02 NG/ML (ref 0–0.04)
WBC # BLD AUTO: 6.9 K/UL (ref 4.6–13.2)

## 2020-05-24 PROCEDURE — 80048 BASIC METABOLIC PNL TOTAL CA: CPT

## 2020-05-24 PROCEDURE — 74011250637 HC RX REV CODE- 250/637: Performed by: EMERGENCY MEDICINE

## 2020-05-24 PROCEDURE — 85610 PROTHROMBIN TIME: CPT

## 2020-05-24 PROCEDURE — 74011636637 HC RX REV CODE- 636/637: Performed by: EMERGENCY MEDICINE

## 2020-05-24 PROCEDURE — 94640 AIRWAY INHALATION TREATMENT: CPT

## 2020-05-24 PROCEDURE — 93005 ELECTROCARDIOGRAM TRACING: CPT

## 2020-05-24 PROCEDURE — 85025 COMPLETE CBC W/AUTO DIFF WBC: CPT

## 2020-05-24 PROCEDURE — 99284 EMERGENCY DEPT VISIT MOD MDM: CPT

## 2020-05-24 PROCEDURE — 82550 ASSAY OF CK (CPK): CPT

## 2020-05-24 PROCEDURE — 71045 X-RAY EXAM CHEST 1 VIEW: CPT

## 2020-05-24 PROCEDURE — 83690 ASSAY OF LIPASE: CPT

## 2020-05-24 PROCEDURE — 74011000250 HC RX REV CODE- 250: Performed by: EMERGENCY MEDICINE

## 2020-05-24 RX ORDER — IPRATROPIUM BROMIDE AND ALBUTEROL SULFATE 2.5; .5 MG/3ML; MG/3ML
3 SOLUTION RESPIRATORY (INHALATION)
Status: COMPLETED | OUTPATIENT
Start: 2020-05-24 | End: 2020-05-24

## 2020-05-24 RX ORDER — OXYCODONE AND ACETAMINOPHEN 5; 325 MG/1; MG/1
1 TABLET ORAL
Status: COMPLETED | OUTPATIENT
Start: 2020-05-24 | End: 2020-05-24

## 2020-05-24 RX ORDER — PREDNISONE 20 MG/1
60 TABLET ORAL
Status: COMPLETED | OUTPATIENT
Start: 2020-05-24 | End: 2020-05-24

## 2020-05-24 RX ADMIN — OXYCODONE HYDROCHLORIDE AND ACETAMINOPHEN 1 TABLET: 5; 325 TABLET ORAL at 22:00

## 2020-05-24 RX ADMIN — PREDNISONE 60 MG: 20 TABLET ORAL at 22:00

## 2020-05-24 RX ADMIN — IPRATROPIUM BROMIDE AND ALBUTEROL SULFATE 3 ML: .5; 3 SOLUTION RESPIRATORY (INHALATION) at 22:00

## 2020-05-25 ENCOUNTER — APPOINTMENT (OUTPATIENT)
Dept: GENERAL RADIOLOGY | Age: 55
End: 2020-05-25
Attending: EMERGENCY MEDICINE
Payer: MEDICAID

## 2020-05-25 ENCOUNTER — APPOINTMENT (OUTPATIENT)
Dept: CT IMAGING | Age: 55
End: 2020-05-25
Attending: EMERGENCY MEDICINE
Payer: MEDICAID

## 2020-05-25 ENCOUNTER — HOSPITAL ENCOUNTER (EMERGENCY)
Age: 55
Discharge: HOME | End: 2020-05-26
Attending: EMERGENCY MEDICINE
Payer: MEDICAID

## 2020-05-25 VITALS
HEART RATE: 72 BPM | HEIGHT: 59 IN | RESPIRATION RATE: 16 BRPM | OXYGEN SATURATION: 100 % | TEMPERATURE: 97.2 F | BODY MASS INDEX: 31.45 KG/M2 | DIASTOLIC BLOOD PRESSURE: 82 MMHG | SYSTOLIC BLOOD PRESSURE: 207 MMHG | WEIGHT: 156 LBS

## 2020-05-25 VITALS
HEIGHT: 59 IN | RESPIRATION RATE: 18 BRPM | OXYGEN SATURATION: 99 % | DIASTOLIC BLOOD PRESSURE: 103 MMHG | SYSTOLIC BLOOD PRESSURE: 187 MMHG | BODY MASS INDEX: 31.45 KG/M2 | TEMPERATURE: 97.1 F | WEIGHT: 156 LBS | HEART RATE: 70 BPM

## 2020-05-25 DIAGNOSIS — G89.29 OTHER CHRONIC PAIN: Primary | ICD-10-CM

## 2020-05-25 DIAGNOSIS — I10 HYPERTENSION, UNSPECIFIED TYPE: ICD-10-CM

## 2020-05-25 DIAGNOSIS — R06.02 SOB (SHORTNESS OF BREATH): Primary | ICD-10-CM

## 2020-05-25 DIAGNOSIS — R42 LIGHTHEADED: ICD-10-CM

## 2020-05-25 DIAGNOSIS — J44.1 ACUTE EXACERBATION OF CHRONIC OBSTRUCTIVE PULMONARY DISEASE (COPD) (HCC): ICD-10-CM

## 2020-05-25 LAB
ALBUMIN SERPL-MCNC: 3.8 G/DL (ref 3.4–5)
ALBUMIN/GLOB SERPL: 1.2 {RATIO} (ref 0.8–1.7)
ALP SERPL-CCNC: 87 U/L (ref 45–117)
ALT SERPL-CCNC: 13 U/L (ref 13–56)
ANION GAP SERPL CALC-SCNC: 4 MMOL/L (ref 3–18)
AST SERPL-CCNC: 9 U/L (ref 10–38)
ATRIAL RATE: 66 BPM
ATRIAL RATE: 72 BPM
BASOPHILS # BLD: 0 K/UL (ref 0–0.1)
BASOPHILS NFR BLD: 0 % (ref 0–2)
BILIRUB SERPL-MCNC: 0.6 MG/DL (ref 0.2–1)
BNP SERPL-MCNC: 582 PG/ML (ref 0–900)
BUN SERPL-MCNC: 14 MG/DL (ref 7–18)
BUN/CREAT SERPL: 14 (ref 12–20)
CALCIUM SERPL-MCNC: 8.5 MG/DL (ref 8.5–10.1)
CALCULATED P AXIS, ECG09: 58 DEGREES
CALCULATED P AXIS, ECG09: 61 DEGREES
CALCULATED R AXIS, ECG10: -34 DEGREES
CALCULATED R AXIS, ECG10: 11 DEGREES
CALCULATED T AXIS, ECG11: 112 DEGREES
CALCULATED T AXIS, ECG11: 124 DEGREES
CHLORIDE SERPL-SCNC: 111 MMOL/L (ref 100–111)
CK MB CFR SERPL CALC: 0.8 % (ref 0–4)
CK MB CFR SERPL CALC: 2.3 % (ref 0–4)
CK MB CFR SERPL CALC: 2.5 % (ref 0–4)
CK MB SERPL-MCNC: 1.3 NG/ML (ref 5–25)
CK MB SERPL-MCNC: 1.8 NG/ML (ref 5–25)
CK MB SERPL-MCNC: 1.9 NG/ML (ref 5–25)
CK SERPL-CCNC: 163 U/L (ref 26–192)
CK SERPL-CCNC: 76 U/L (ref 26–192)
CK SERPL-CCNC: 78 U/L (ref 26–192)
CO2 SERPL-SCNC: 31 MMOL/L (ref 21–32)
CREAT SERPL-MCNC: 0.98 MG/DL (ref 0.6–1.3)
DIAGNOSIS, 93000: NORMAL
DIAGNOSIS, 93000: NORMAL
DIFFERENTIAL METHOD BLD: NORMAL
EOSINOPHIL # BLD: 0.4 K/UL (ref 0–0.4)
EOSINOPHIL NFR BLD: 5 % (ref 0–5)
ERYTHROCYTE [DISTWIDTH] IN BLOOD BY AUTOMATED COUNT: 13.7 % (ref 11.6–14.5)
GLOBULIN SER CALC-MCNC: 3.1 G/DL (ref 2–4)
GLUCOSE SERPL-MCNC: 92 MG/DL (ref 74–99)
HCT VFR BLD AUTO: 37.7 % (ref 35–45)
HGB BLD-MCNC: 12.1 G/DL (ref 12–16)
INR PPP: 1.1 (ref 0.8–1.2)
LYMPHOCYTES # BLD: 2.1 K/UL (ref 0.9–3.6)
LYMPHOCYTES NFR BLD: 28 % (ref 21–52)
MCH RBC QN AUTO: 28.7 PG (ref 24–34)
MCHC RBC AUTO-ENTMCNC: 32.1 G/DL (ref 31–37)
MCV RBC AUTO: 89.5 FL (ref 74–97)
MONOCYTES # BLD: 0.5 K/UL (ref 0.05–1.2)
MONOCYTES NFR BLD: 6 % (ref 3–10)
NEUTS SEG # BLD: 4.5 K/UL (ref 1.8–8)
NEUTS SEG NFR BLD: 61 % (ref 40–73)
P-R INTERVAL, ECG05: 130 MS
P-R INTERVAL, ECG05: 146 MS
PLATELET # BLD AUTO: 215 K/UL (ref 135–420)
PMV BLD AUTO: 10.9 FL (ref 9.2–11.8)
POTASSIUM SERPL-SCNC: 3.5 MMOL/L (ref 3.5–5.5)
PROT SERPL-MCNC: 6.9 G/DL (ref 6.4–8.2)
PROTHROMBIN TIME: 13.9 SEC (ref 11.5–15.2)
Q-T INTERVAL, ECG07: 412 MS
Q-T INTERVAL, ECG07: 452 MS
QRS DURATION, ECG06: 128 MS
QRS DURATION, ECG06: 128 MS
QTC CALCULATION (BEZET), ECG08: 451 MS
QTC CALCULATION (BEZET), ECG08: 473 MS
RBC # BLD AUTO: 4.21 M/UL (ref 4.2–5.3)
SODIUM SERPL-SCNC: 146 MMOL/L (ref 136–145)
TROPONIN I SERPL-MCNC: <0.02 NG/ML (ref 0–0.04)
VENTRICULAR RATE, ECG03: 66 BPM
VENTRICULAR RATE, ECG03: 72 BPM
WBC # BLD AUTO: 7.4 K/UL (ref 4.6–13.2)

## 2020-05-25 PROCEDURE — 74011636637 HC RX REV CODE- 636/637: Performed by: EMERGENCY MEDICINE

## 2020-05-25 PROCEDURE — 99283 EMERGENCY DEPT VISIT LOW MDM: CPT

## 2020-05-25 PROCEDURE — 74011250637 HC RX REV CODE- 250/637: Performed by: EMERGENCY MEDICINE

## 2020-05-25 PROCEDURE — 83880 ASSAY OF NATRIURETIC PEPTIDE: CPT

## 2020-05-25 PROCEDURE — 85025 COMPLETE CBC W/AUTO DIFF WBC: CPT

## 2020-05-25 PROCEDURE — 85610 PROTHROMBIN TIME: CPT

## 2020-05-25 PROCEDURE — 71045 X-RAY EXAM CHEST 1 VIEW: CPT

## 2020-05-25 PROCEDURE — 80053 COMPREHEN METABOLIC PANEL: CPT

## 2020-05-25 PROCEDURE — 74011250636 HC RX REV CODE- 250/636: Performed by: EMERGENCY MEDICINE

## 2020-05-25 PROCEDURE — 93005 ELECTROCARDIOGRAM TRACING: CPT

## 2020-05-25 PROCEDURE — 70450 CT HEAD/BRAIN W/O DYE: CPT

## 2020-05-25 PROCEDURE — 99285 EMERGENCY DEPT VISIT HI MDM: CPT

## 2020-05-25 PROCEDURE — 96374 THER/PROPH/DIAG INJ IV PUSH: CPT

## 2020-05-25 RX ORDER — OXYCODONE AND ACETAMINOPHEN 5; 325 MG/1; MG/1
1 TABLET ORAL
Status: COMPLETED | OUTPATIENT
Start: 2020-05-25 | End: 2020-05-25

## 2020-05-25 RX ORDER — CLONIDINE HYDROCHLORIDE 0.1 MG/1
0.2 TABLET ORAL
Status: DISCONTINUED | OUTPATIENT
Start: 2020-05-25 | End: 2020-05-26 | Stop reason: HOSPADM

## 2020-05-25 RX ORDER — ALBUTEROL SULFATE 90 UG/1
2 AEROSOL, METERED RESPIRATORY (INHALATION)
Qty: 1 INHALER | Refills: 0 | Status: SHIPPED | OUTPATIENT
Start: 2020-05-25 | End: 2020-05-30

## 2020-05-25 RX ORDER — KETOROLAC TROMETHAMINE 15 MG/ML
15 INJECTION, SOLUTION INTRAMUSCULAR; INTRAVENOUS ONCE
Status: DISCONTINUED | OUTPATIENT
Start: 2020-05-25 | End: 2020-05-25

## 2020-05-25 RX ORDER — CLONIDINE HYDROCHLORIDE 0.1 MG/1
0.1 TABLET ORAL
Status: DISCONTINUED | OUTPATIENT
Start: 2020-05-25 | End: 2020-05-25 | Stop reason: SDUPTHER

## 2020-05-25 RX ORDER — FUROSEMIDE 10 MG/ML
40 INJECTION INTRAMUSCULAR; INTRAVENOUS
Status: COMPLETED | OUTPATIENT
Start: 2020-05-25 | End: 2020-05-25

## 2020-05-25 RX ORDER — CLONIDINE HYDROCHLORIDE 0.1 MG/1
0.2 TABLET ORAL
Status: COMPLETED | OUTPATIENT
Start: 2020-05-25 | End: 2020-05-25

## 2020-05-25 RX ORDER — ALBUTEROL SULFATE 90 UG/1
2 AEROSOL, METERED RESPIRATORY (INHALATION)
Qty: 1 INHALER | Refills: 0 | Status: SHIPPED | OUTPATIENT
Start: 2020-05-25 | End: 2020-05-25

## 2020-05-25 RX ORDER — PREDNISONE 20 MG/1
60 TABLET ORAL
Status: COMPLETED | OUTPATIENT
Start: 2020-05-25 | End: 2020-05-25

## 2020-05-25 RX ORDER — AMLODIPINE BESYLATE 10 MG/1
10 TABLET ORAL
Status: DISCONTINUED | OUTPATIENT
Start: 2020-05-25 | End: 2020-05-25

## 2020-05-25 RX ADMIN — PREDNISONE 60 MG: 20 TABLET ORAL at 11:26

## 2020-05-25 RX ADMIN — FUROSEMIDE 40 MG: 10 INJECTION, SOLUTION INTRAMUSCULAR; INTRAVENOUS at 11:59

## 2020-05-25 RX ADMIN — CLONIDINE HYDROCHLORIDE 0.2 MG: 0.1 TABLET ORAL at 23:10

## 2020-05-25 RX ADMIN — OXYCODONE HYDROCHLORIDE AND ACETAMINOPHEN 1 TABLET: 5; 325 TABLET ORAL at 11:41

## 2020-05-25 NOTE — DISCHARGE INSTRUCTIONS
Patient Education        Chest Pain: Care Instructions  Your Care Instructions    There are many things that can cause chest pain. Some are not serious and will get better on their own in a few days. But some kinds of chest pain need more testing and treatment. Your doctor may have recommended a follow-up visit in the next 8 to 12 hours. If you are not getting better, you may need more tests or treatment. Even though your doctor has released you, you still need to watch for any problems. The doctor carefully checked you, but sometimes problems can develop later. If you have new symptoms or if your symptoms do not get better, get medical care right away. If you have worse or different chest pain or pressure that lasts more than 5 minutes or you passed out (lost consciousness), call 911 or seek other emergency help right away. A medical visit is only one step in your treatment. Even if you feel better, you still need to do what your doctor recommends, such as going to all suggested follow-up appointments and taking medicines exactly as directed. This will help you recover and help prevent future problems. How can you care for yourself at home? · Rest until you feel better. · Take your medicine exactly as prescribed. Call your doctor if you think you are having a problem with your medicine. · Do not drive after taking a prescription pain medicine. When should you call for help? Call 911 if:    · You passed out (lost consciousness).     · You have severe difficulty breathing.     · You have symptoms of a heart attack. These may include:  ? Chest pain or pressure, or a strange feeling in your chest.  ? Sweating. ? Shortness of breath. ? Nausea or vomiting. ? Pain, pressure, or a strange feeling in your back, neck, jaw, or upper belly or in one or both shoulders or arms. ? Lightheadedness or sudden weakness. ? A fast or irregular heartbeat.   After you call  911, the  may tell you to chew 1 adult-strength or 2 to 4 low-dose aspirin. Wait for an ambulance. Do not try to drive yourself.    Call your doctor today if:    · You have any trouble breathing.     · Your chest pain gets worse.     · You are dizzy or lightheaded, or you feel like you may faint.     · You are not getting better as expected.     · You are having new or different chest pain. Where can you learn more? Go to http://magda-kadie.info/  Enter A120 in the search box to learn more about \"Chest Pain: Care Instructions. \"  Current as of: June 26, 2019Content Version: 12.4  © 5925-7608 [employer]. Care instructions adapted under license by [employer] (which disclaims liability or warranty for this information). If you have questions about a medical condition or this instruction, always ask your healthcare professional. [place] [address] any warranty or liability for your use of this information. Patient Education        Elevated Blood Pressure: Care Instructions  Your Care Instructions    Blood pressure is a measure of how hard the blood pushes against the walls of your arteries. It's normal for blood pressure to go up and down throughout the day. But if it stays up over time, you have high blood pressure. Two numbers tell you your blood pressure. The first number is the systolic pressure. It shows how hard the blood pushes when your heart is pumping. The second number is the diastolic pressure. It shows how hard the blood pushes between heartbeats, when your heart is relaxed and filling with blood. An ideal blood pressure in adults is less than 120/80 (say \"120 over 80\"). High blood pressure is 140/90 or higher. You have high blood pressure if your top number is 140 or higher or your bottom number is 90 or higher, or both. The main test for high blood pressure is simple, fast, and painless.  To diagnose high blood pressure, your doctor will test your blood pressure at different times. After testing your blood pressure, your doctor may ask you to test it again when you are home. If you are diagnosed with high blood pressure, you can work with your doctor to make a long-term plan to manage it. Follow-up care is a key part of your treatment and safety. Be sure to make and go to all appointments, and call your doctor if you are having problems. It's also a good idea to know your test results and keep a list of the medicines you take. How can you care for yourself at home? · Do not smoke. Smoking increases your risk for heart attack and stroke. If you need help quitting, talk to your doctor about stop-smoking programs and medicines. These can increase your chances of quitting for good. · Stay at a healthy weight. · Try to limit how much sodium you eat to less than 2,300 milligrams (mg) a day. Your doctor may ask you to try to eat less than 1,500 mg a day. · Be physically active. Get at least 30 minutes of exercise on most days of the week. Walking is a good choice. You also may want to do other activities, such as running, swimming, cycling, or playing tennis or team sports. · Avoid or limit alcohol. Talk to your doctor about whether you can drink any alcohol. · Eat plenty of fruits, vegetables, and low-fat dairy products. Eat less saturated and total fats. · Learn how to check your blood pressure at home. When should you call for help? Call your doctor now or seek immediate medical care if:  ? · Your blood pressure is much higher than normal (such as 180/110 or higher). ? · You think high blood pressure is causing symptoms such as:  ¨ Severe headache. ¨ Blurry vision. ? Watch closely for changes in your health, and be sure to contact your doctor if:  ? · You do not get better as expected. Where can you learn more? Go to http://magda-kadie.info/.   Enter V972 in the search box to learn more about \"Elevated Blood Pressure: Care Instructions. \"  Current as of: September 21, 2016  Content Version: 11.4  © 2687-2760 [employer], [facility]. Care instructions adapted under license by [employer] (which disclaims liability or warranty for this information). If you have questions about a medical condition or this instruction, always ask your healthcare professional. [place] [address] any warranty or liability for your use of this information.

## 2020-05-25 NOTE — ED NOTES
Pt attempting to leave AMA, this RN provided education to patient regarding importance of waiting until she is discharged for her needed RX and awaiting lab results. Pt agreed to wait.

## 2020-05-25 NOTE — ED TRIAGE NOTES
Patient presents to the ED via EMS with complaints of chest pain and right leg pain. Per EMS patient was ambulatory on scene. Patient was given 324 mg of Asprin due to prior history of NSTEMI. Upon arrival patient is alert and oriented x 4. No obvious signs of distress noted.

## 2020-05-25 NOTE — ED NOTES
Pt states \"I can't wait any longer, I have to get home to the company that's there, my ride is here and won't wait for me. I will call for my results later. \" Pt declined signing AMA form and was taken by wc to awaiting family member in ED waiting area.

## 2020-05-25 NOTE — ED NOTES
I have reviewed discharge instructions with the patient. The patient verbalized understanding. Patient armband removed and given to patient to take home. Patient was informed of the privacy risks if armband lost or stolen. Patient alert and oriented x 4. No obvious signs of distress noted.

## 2020-05-25 NOTE — ED TRIAGE NOTES
Pt arrived via EMS with complaints of shortness of breath. Per EMS report patient was seen last night here and reports no relief of symptoms at this time. Pt completed a home breathing treatment prior to EMS arrival. Per report patient had audible wheezing noted. Pt treated with 81mg ASA and nitro x1 for possible STEMI noted on EKG by EMS. Pt arrived on NRB @15L SPO2 100%. LCTA upon arrival. EKG in progress. O2 decreased to 6L NRB.

## 2020-05-25 NOTE — ED PROVIDER NOTES
Tsering Laws is a 53 y.o. female with a past medical history of hypertension, CHF, hyperlipidemia, COPD, asthma, and previous polysubstance abuse coming in complaining of chest pain. Patient states that 1 of her parents  in the last few days and she has been under a lot of stress. She states that 3 hours ago she was arguing and fighting with a relative and started having chest pain in the left side of her chest.  She states it feels a pressure and is nonradiating. She states she has associated shortness of breath and had some associated hand tingling. She denies any diaphoresis, nausea, or vomiting. She states the pain is persisted so she decided come in for evaluation. She also reports easy bruising over her lower extremities recently, but cannot remember any trauma. She states that she has chronic pain used to be on pain management. She is requesting pain medication. Past Medical History:   Diagnosis Date    Asthma     CHF (congestive heart failure) (HCC)     Chronic obstructive pulmonary disease (HCC)     Dependence on supplemental oxygen     Endocrine disease     thyroid issues    Gastrointestinal disorder     \"blockage in my stomach\"    Hypercholesterolemia     Hypertension        Past Surgical History:   Procedure Laterality Date    HX GI      Exploratory laparotomy with lysis of adhesions and primary repair of incarcerated umbilical hernia         No family history on file.     Social History     Socioeconomic History    Marital status: SINGLE     Spouse name: Not on file    Number of children: Not on file    Years of education: Not on file    Highest education level: Not on file   Occupational History    Not on file   Social Needs    Financial resource strain: Not on file    Food insecurity     Worry: Not on file     Inability: Not on file    Transportation needs     Medical: Not on file     Non-medical: Not on file   Tobacco Use    Smoking status: Current Every Day Smoker     Packs/day: 0.25    Smokeless tobacco: Former User   Substance and Sexual Activity    Alcohol use: No     Comment: socially    Drug use: Not Currently     Types: Heroin    Sexual activity: Never     Comment: last used 9 years ago   Lifestyle    Physical activity     Days per week: Not on file     Minutes per session: Not on file    Stress: Not on file   Relationships    Social connections     Talks on phone: Not on file     Gets together: Not on file     Attends Latter-day service: Not on file     Active member of club or organization: Not on file     Attends meetings of clubs or organizations: Not on file     Relationship status: Not on file    Intimate partner violence     Fear of current or ex partner: Not on file     Emotionally abused: Not on file     Physically abused: Not on file     Forced sexual activity: Not on file   Other Topics Concern    Not on file   Social History Narrative    Not on file         ALLERGIES: Patient has no known allergies. Review of Systems   Constitutional: Negative. Negative for chills, diaphoresis and fever. Respiratory: Positive for shortness of breath. Negative for cough. Cardiovascular: Positive for chest pain. Negative for leg swelling. Gastrointestinal: Negative. Negative for abdominal pain, nausea and vomiting. Musculoskeletal: Negative. Negative for myalgias. Skin: Negative. Negative for rash. Neurological: Positive for numbness (Numbness and tingling in both hands, L>R). Negative for dizziness, weakness and light-headedness. All other systems reviewed and are negative. Vitals:    05/24/20 2130   BP: (!) 187/103   Pulse: 70   Resp: 18   Temp: 97.1 °F (36.2 °C)   SpO2: 98%   Weight: 70.8 kg (156 lb)   Height: 4' 11\" (1.499 m)            Physical Exam  Vitals signs reviewed. Constitutional:       General: She is not in acute distress. Appearance: Normal appearance. She is well-developed.    HENT:      Head: Normocephalic and atraumatic. Eyes:      Extraocular Movements: Extraocular movements intact. Conjunctiva/sclera: Conjunctivae normal.      Pupils: Pupils are equal, round, and reactive to light. Neck:      Musculoskeletal: Normal range of motion and neck supple. Cardiovascular:      Rate and Rhythm: Normal rate and regular rhythm. Heart sounds: S1 normal and S2 normal. No murmur. No friction rub. No gallop. Pulmonary:      Effort: Pulmonary effort is normal. No accessory muscle usage or respiratory distress. Comments: Patient has some expiratory wheezing in all lung fields. There is a mildly prolonged expiratory phase. Speaking full sentences in no distress. No retractions or accessory abdominal muscle use. Abdominal:      General: There is no distension. Tenderness: There is no abdominal tenderness. Musculoskeletal: Normal range of motion. General: No tenderness. Skin:     General: Skin is warm. Findings: No rash. Comments: 2 bruises on the right lower extremity. No petechiae. Neurological:      General: No focal deficit present. Mental Status: She is alert and oriented to person, place, and time. Sensory: No sensory deficit. Motor: No weakness. Comments: No dysarthria, slurred speech, or facial symmetry. Psychiatric:         Speech: Speech normal.          MDM  Number of Diagnoses or Management Options  Acute chest pain:   Elevated blood pressure reading:   Diagnosis management comments: Margie Saldivar is a 53 y.o. female coming in complaining of chest pain. Patient also complained of diffuse leg and back pain consistent with her chronic pain. EKG shows left bundle branch block consistent with prior EKGs. No evidence of acute ischemic changes. Will work-up to rule out ACS. Patient also with some wheezing will treat for COPD exacerbation. Patient continues to smoke. No evidence of any acute distress.   No tachycardia, tachypnea, or hypoxia to suggest pulmonary embolism. Low suspicion of aortic pathology. Patient had negative serial cardiac enzymes. EKG not concerning for acute ischemic changes. Low suspicion of ACS. Given patient's recent psychosocial stressors I suspect that that may have been played a role in her symptoms. She is feeling much better here in the emergency department. I advised her that if her symptoms return her get worse she should come back immediately. She was otherwise instructed to call her cardiologist in the next couple days and to get a follow-up appointment as soon as she can. She verbalizes understanding agrees with this plan.          Procedures      Vitals:  Patient Vitals for the past 12 hrs:   Temp Pulse Resp BP SpO2   05/24/20 2130 97.1 °F (36.2 °C) 70 18 (!) 187/103 98 %       Medications ordered:   Medications   oxyCODONE-acetaminophen (PERCOCET) 5-325 mg per tablet 1 Tab (1 Tab Oral Given 5/24/20 2200)   albuterol-ipratropium (DUO-NEB) 2.5 MG-0.5 MG/3 ML (3 mL Nebulization Given 5/24/20 2200)   predniSONE (DELTASONE) tablet 60 mg (60 mg Oral Given 5/24/20 2200)         Lab findings:  Recent Results (from the past 12 hour(s))   EKG, 12 LEAD, INITIAL    Collection Time: 05/24/20  9:16 PM   Result Value Ref Range    Ventricular Rate 66 BPM    Atrial Rate 66 BPM    P-R Interval 130 ms    QRS Duration 128 ms    Q-T Interval 452 ms    QTC Calculation (Bezet) 473 ms    Calculated P Axis 58 degrees    Calculated R Axis -34 degrees    Calculated T Axis 112 degrees    Diagnosis       Sinus rhythm with occasional premature ventricular complexes  Possible Left atrial enlargement  Left axis deviation  Left bundle branch block  Abnormal ECG  When compared with ECG of 12-MAY-2020 06:17,  premature ventricular complexes are now present     CBC WITH AUTOMATED DIFF    Collection Time: 05/24/20  9:30 PM   Result Value Ref Range    WBC 6.9 4.6 - 13.2 K/uL    RBC 4.09 (L) 4.20 - 5.30 M/uL    HGB 11.9 (L) 12.0 - 16.0 g/dL    HCT 35.8 35.0 - 45.0 %    MCV 87.5 74.0 - 97.0 FL    MCH 29.1 24.0 - 34.0 PG    MCHC 33.2 31.0 - 37.0 g/dL    RDW 13.4 11.6 - 14.5 %    PLATELET 179 024 - 858 K/uL    MPV 11.0 9.2 - 11.8 FL    NEUTROPHILS 67 40 - 73 %    LYMPHOCYTES 25 21 - 52 %    MONOCYTES 4 3 - 10 %    EOSINOPHILS 4 0 - 5 %    BASOPHILS 0 0 - 2 %    ABS. NEUTROPHILS 4.6 1.8 - 8.0 K/UL    ABS. LYMPHOCYTES 1.7 0.9 - 3.6 K/UL    ABS. MONOCYTES 0.3 0.05 - 1.2 K/UL    ABS. EOSINOPHILS 0.3 0.0 - 0.4 K/UL    ABS. BASOPHILS 0.0 0.0 - 0.1 K/UL    DF AUTOMATED     METABOLIC PANEL, BASIC    Collection Time: 05/24/20  9:30 PM   Result Value Ref Range    Sodium 143 136 - 145 mmol/L    Potassium 3.5 3.5 - 5.5 mmol/L    Chloride 109 100 - 111 mmol/L    CO2 29 21 - 32 mmol/L    Anion gap 5 3.0 - 18 mmol/L    Glucose 107 (H) 74 - 99 mg/dL    BUN 12 7.0 - 18 MG/DL    Creatinine 0.87 0.6 - 1.3 MG/DL    BUN/Creatinine ratio 14 12 - 20      GFR est AA >60 >60 ml/min/1.73m2    GFR est non-AA >60 >60 ml/min/1.73m2    Calcium 8.5 8.5 - 10.1 MG/DL   CARDIAC PANEL,(CK, CKMB & TROPONIN)    Collection Time: 05/24/20  9:30 PM   Result Value Ref Range    CK - MB 2.0 <3.6 ng/ml    CK-MB Index 2.3 0.0 - 4.0 %    CK 88 26 - 192 U/L    Troponin-I, QT <0.02 0.0 - 0.045 NG/ML   PROTHROMBIN TIME + INR    Collection Time: 05/24/20  9:30 PM   Result Value Ref Range    Prothrombin time 14.3 11.5 - 15.2 sec    INR 1.1 0.8 - 1.2     LIPASE    Collection Time: 05/24/20  9:30 PM   Result Value Ref Range    Lipase 47 (L) 73 - 393 U/L   CARDIAC PANEL,(CK, CKMB & TROPONIN)    Collection Time: 05/24/20 11:46 PM   Result Value Ref Range    CK - MB 1.9 <3.6 ng/ml    CK-MB Index 2.5 0.0 - 4.0 %    CK 76 26 - 192 U/L    Troponin-I, QT <0.02 0.0 - 0.045 NG/ML       EKG interpretation by ED Physician: Sinus rhythm rate of 66 bpm.  Occasional PVC. Left bundle branch block pattern consistent with prior EKGs.     X-Ray, CT or other radiology findings or impressions:  XR CHEST PORT   Final Result IMPRESSION[de-identified]      1.  No acute cardiopulmonary disease. Disposition:  Diagnosis:   1. Acute chest pain    2. Elevated blood pressure reading        Disposition: Discharge    Follow-up Information     Follow up With Specialties Details Why Nima Curran MD Cardiology Schedule an appointment as soon as possible for a visit for office follow up [address] [facility]      Mandeep Garnica NP Nurse Practitioner Schedule an appointment as soon as possible for a visit for office follow up [address] [place] [facility] EMERGENCY DEPT Emergency Medicine  As needed, If symptoms worsen [address] [facility] [address]  [phone]           Patient's Medications   Start Taking    No medications on file   Continue Taking    ALBUTEROL (ACCUNEB) 1.25 MG/3 ML NEBU    Take 3 mL by inhalation every four (4) hours as needed for Wheezing (wheezing). ALBUTEROL (PROVENTIL HFA, VENTOLIN HFA, PROAIR HFA) 90 MCG/ACTUATION INHALER    Take 2 Puffs by inhalation every four (4) hours as needed for Wheezing or Shortness of Breath. Indications: asthma attack, chronic obstructive pulmonary disease    AMLODIPINE (NORVASC) 10 MG TABLET    Take 1 Tab by mouth daily. ARFORMOTEROL (BROVANA) 15 MCG/2 ML NEBU NEB SOLUTION    2 mL by Nebulization route two (2) times a day. ASPIRIN DELAYED-RELEASE 81 MG TABLET    Take 1 Tab by mouth daily. ATORVASTATIN (LIPITOR) 20 MG TABLET    Take 1 Tab by mouth nightly. BUDESONIDE (PULMICORT) 0.5 MG/2 ML NBSP    2 mL by Nebulization route two (2) times a day. CLONIDINE HCL (CATAPRES) 0.2 MG TABLET    Take 1 Tab by mouth two (2) times a day. DOCUSATE SODIUM (COLACE) 100 MG CAPSULE    Take 1 Cap by mouth two (2) times a day. ESCITALOPRAM OXALATE (LEXAPRO) 10 MG TABLET    Take 1 Tab by mouth every evening. FAMOTIDINE (PEPCID) 20 MG TABLET    Take 1 Tab by mouth daily.     FLUTICASONE FUROATE-VILANTEROL (BREO ELLIPTA) 200-25 MCG/DOSE INHALER    Take 1 Puff by inhalation daily. METOPROLOL TARTRATE (LOPRESSOR) 25 MG TABLET    Take 0.5 Tabs by mouth every twelve (12) hours. MONTELUKAST (SINGULAIR) 10 MG TABLET    Take 1 Tab by mouth nightly. NALOXONE (NARCAN) 4 MG/ACTUATION NASAL SPRAY    Use 1 spray intranasally, then discard. Repeat with new spray every 2 min as needed for opioid overdose symptoms, alternating nostrils. ONDANSETRON (ZOFRAN ODT) 4 MG DISINTEGRATING TABLET    Take 1 Tab by mouth every eight (8) hours as needed for Nausea. Indications: prevent nausea and vomiting after surgery    OTHER    Incentive spirometry- use as directed    OXYGEN-AIR DELIVERY SYSTEMS    3 L by IntraNASal route as needed for Other (sob). ROFLUMILAST (DALIRESP) 500 MCG TAB TABLET    Take 1 Tab by mouth daily. TIOTROPIUM (SPIRIVA) 18 MCG INHALATION CAPSULE    Take 1 Cap by inhalation daily.    These Medications have changed    No medications on file   Stop Taking    No medications on file

## 2020-05-25 NOTE — ED PROVIDER NOTES
EMERGENCY DEPARTMENT HISTORY AND PHYSICAL EXAM  This was created with voice recognition software and transcription errors may be present. 9:15 AM  Date: 5/25/2020  Patient Name: Adrián Walker    History of Presenting Illness     Chief Complaint:    History Provided By:     HPI: Adrián Walker is a 53 y.o. female past medical history of asthma CHF COPD oxygen dependent on 2 L per my last note who presents with shortness of breath is continued since last night associated with some chest pain no nausea or vomiting. No diaphoresis. No radiation. Patient states the symptoms have not improved. She notes that she gets headaches and she is due to have a CT of her head by their neurology    PCP: Madneep Garnica NP      Past History     Past Medical History:  Past Medical History:   Diagnosis Date    Asthma     [facility]congest[facility]) [facility] [address]     Chronic obstructive pulmonary disease ([facility] [address]     Dependence on supplemental oxygen     Endocrine disease     thyroid issues    Gastrointestinal disorder     \"blockage in my stomach\"    Hypercholesterolemia     Hypertension        Past Surgical History:  Past Surgical History:   Procedure Laterality Date    HX GI      Exploratory laparotomy with lysis of adhesions and primary repair of incarcerated umbilical hernia       Family History:  No family history on file. Social History:  Social History     Tobacco Use    Smoking status: Current Every Day Smoker     Packs/day: 0.25    Smokeless tobacco: Former User   Substance Use Topics    Alcohol use: No     Comment: socially    Drug use: Not Currently     Types: Heroin       Allergies:  No Known Allergies    Review of Systems     Review of Systems   All other systems reviewed and are negative. 10 point review of systems otherwise negative unless noted in HPI. Physical Exam       Physical Exam  Constitutional:       Appearance: She is well-developed. HENT:      Head: Normocephalic and atraumatic. Eyes:      Pupils: Pupils are equal, round, and reactive to light. Neck:      Musculoskeletal: Normal range of motion and neck supple. Cardiovascular:      Rate and Rhythm: Normal rate and regular rhythm. Heart sounds: Normal heart sounds. No murmur. No friction rub. Pulmonary:      Effort: Pulmonary effort is normal. No respiratory distress. Breath sounds: Normal breath sounds. No wheezing. Abdominal:      General: There is no distension. Palpations: Abdomen is soft. Tenderness: There is no abdominal tenderness. There is no guarding or rebound. Musculoskeletal: Normal range of motion. Skin:     General: Skin is warm and dry. Neurological:      Mental Status: She is alert and oriented to person, place, and time. Psychiatric:         Behavior: Behavior normal.         Thought Content: Thought content normal.         Diagnostic Study Results     Vital Signs  EKG: EKG shows sinus rhythm with a rate of 72 with a normal a left bundle at 128 with no ST elevation or depression and no hypertrophy  Labs: Chemistry unremarkable BNP negative cbc unremarkable  Imaging: IMPRESSION:      Mild diffuse prominence of the interstitial markings as described. No evidence  of focal pulmonary consolidation. Medical Decision Making     ED Course: Progress Notes, Reevaluation, and Consults:      Provider Notes (Medical Decision Making):     Troponin pending patient feels better she refused to stay any further. Understands limitations of the work-up. Diagnosis     Clinical Impression: No diagnosis found. Disposition:    Patient's Medications   Start Taking    No medications on file   Continue Taking    ALBUTEROL (ACCUNEB) 1.25 MG/3 ML NEBU    Take 3 mL by inhalation every four (4) hours as needed for Wheezing (wheezing).     ALBUTEROL (PROVENTIL HFA, VENTOLIN HFA, PROAIR HFA) 90 MCG/ACTUATION INHALER    Take 2 Puffs by inhalation every four (4) hours as needed for Wheezing or Shortness of Breath. Indications: asthma attack, chronic obstructive pulmonary disease    ALBUTEROL (PROVENTIL HFA, VENTOLIN HFA, PROAIR HFA) 90 MCG/ACTUATION INHALER    Take 2 Puffs by inhalation every four (4) hours as needed for Wheezing. AMLODIPINE (NORVASC) 10 MG TABLET    Take 1 Tab by mouth daily. ARFORMOTEROL (BROVANA) 15 MCG/2 ML NEBU NEB SOLUTION    2 mL by Nebulization route two (2) times a day. ASPIRIN DELAYED-RELEASE 81 MG TABLET    Take 1 Tab by mouth daily. ATORVASTATIN (LIPITOR) 20 MG TABLET    Take 1 Tab by mouth nightly. BUDESONIDE (PULMICORT) 0.5 MG/2 ML NBSP    2 mL by Nebulization route two (2) times a day. CLONIDINE HCL (CATAPRES) 0.2 MG TABLET    Take 1 Tab by mouth two (2) times a day. DOCUSATE SODIUM (COLACE) 100 MG CAPSULE    Take 1 Cap by mouth two (2) times a day. ESCITALOPRAM OXALATE (LEXAPRO) 10 MG TABLET    Take 1 Tab by mouth every evening. FAMOTIDINE (PEPCID) 20 MG TABLET    Take 1 Tab by mouth daily. FLUTICASONE FUROATE-VILANTEROL (BREO ELLIPTA) 200-25 MCG/DOSE INHALER    Take 1 Puff by inhalation daily. METOPROLOL TARTRATE (LOPRESSOR) 25 MG TABLET    Take 0.5 Tabs by mouth every twelve (12) hours. MONTELUKAST (SINGULAIR) 10 MG TABLET    Take 1 Tab by mouth nightly. NALOXONE (NARCAN) 4 MG/ACTUATION NASAL SPRAY    Use 1 spray intranasally, then discard. Repeat with new spray every 2 min as needed for opioid overdose symptoms, alternating nostrils. ONDANSETRON (ZOFRAN ODT) 4 MG DISINTEGRATING TABLET    Take 1 Tab by mouth every eight (8) hours as needed for Nausea. Indications: prevent nausea and vomiting after surgery    OTHER    Incentive spirometry- use as directed    OXYGEN-AIR DELIVERY SYSTEMS    3 L by IntraNASal route as needed for Other (sob). ROFLUMILAST (DALIRESP) 500 MCG TAB TABLET    Take 1 Tab by mouth daily. TIOTROPIUM (SPIRIVA) 18 MCG INHALATION CAPSULE    Take 1 Cap by inhalation daily.    These Medications have changed    No medications on file   Stop Taking    No medications on file

## 2020-05-26 ENCOUNTER — PATIENT OUTREACH (OUTPATIENT)
Dept: CASE MANAGEMENT | Age: 55
End: 2020-05-26

## 2020-05-26 VITALS
SYSTOLIC BLOOD PRESSURE: 115 MMHG | HEART RATE: 70 BPM | RESPIRATION RATE: 18 BRPM | OXYGEN SATURATION: 100 % | DIASTOLIC BLOOD PRESSURE: 52 MMHG

## 2020-05-26 LAB
ATRIAL RATE: 75 BPM
CALCULATED P AXIS, ECG09: 74 DEGREES
CALCULATED R AXIS, ECG10: -55 DEGREES
CALCULATED T AXIS, ECG11: 102 DEGREES
DIAGNOSIS, 93000: NORMAL
P-R INTERVAL, ECG05: 142 MS
Q-T INTERVAL, ECG07: 444 MS
QRS DURATION, ECG06: 126 MS
QTC CALCULATION (BEZET), ECG08: 495 MS
VENTRICULAR RATE, ECG03: 75 BPM

## 2020-05-26 NOTE — ED PROVIDER NOTES
EMERGENCY DEPARTMENT HISTORY AND PHYSICAL EXAM    10:32 PM  Date: 5/25/2020  Patient Name: Ananya Khan    History of Presenting Illness     No chief complaint on file. History Provided By: Patient    HPI: Ananya Khan is a 53 y.o. female with multiple medical problems as below including CHF and polysubstance abuse and frequent ER visits for chronic pain. Patient was brought in by ambulance for lightheadedness. She was seen this morning and yesterday for the same complaints and had a negative work-up. According the patient she is due to get an outpatient CT scan by her neurologist for frequent headaches and lightheadedness. Reports that she does have chest pain and shortness of breath which are baseline for her with no new symptoms. No history of nausea, vomiting. Does not have a headache currently. She has been under a lot of stress recently due to family member death. No weakness, tingling or vision changes. Location:  Severity:  Timing/course: Onset/Duration:     PCP: Peggy Mendoza NP    Past History     Past Medical History:  Past Medical History:   Diagnosis Date    Asthma     CHF (congestive heart failure) (HCC)     Chronic obstructive pulmonary disease (HCC)     Dependence on supplemental oxygen     Endocrine disease     thyroid issues    Gastrointestinal disorder     \"blockage in my stomach\"    Hypercholesterolemia     Hypertension        Past Surgical History:  Past Surgical History:   Procedure Laterality Date    HX GI      Exploratory laparotomy with lysis of adhesions and primary repair of incarcerated umbilical hernia       Family History:  No family history on file.     Social History:  Social History     Tobacco Use    Smoking status: Current Every Day Smoker     Packs/day: 0.25    Smokeless tobacco: Former User   Substance Use Topics    Alcohol use: No     Comment: socially    Drug use: Not Currently     Types: Heroin       Allergies:  No Known Allergies    Review of Systems   Review of Systems   Respiratory: Positive for shortness of breath. Cardiovascular: Positive for chest pain. Neurological: Positive for light-headedness. All other systems reviewed and are negative. Physical Exam     Patient Vitals for the past 12 hrs:   Pulse Resp BP SpO2   05/25/20 2202 70 18 137/73 98 %       Physical Exam  Vitals signs and nursing note reviewed. Constitutional:       Appearance: Normal appearance. HENT:      Head: Normocephalic and atraumatic. Eyes:      Extraocular Movements: Extraocular movements intact. Neck:      Musculoskeletal: Normal range of motion and neck supple. Cardiovascular:      Rate and Rhythm: Normal rate. Pulmonary:      Effort: Pulmonary effort is normal. No respiratory distress. Musculoskeletal: Normal range of motion. General: No deformity or signs of injury. Skin:     General: Skin is warm and dry. Neurological:      General: No focal deficit present. Mental Status: She is alert and oriented to person, place, and time. Motor: No weakness.       Coordination: Coordination normal.   Psychiatric:         Mood and Affect: Mood normal.         Behavior: Behavior normal.         Diagnostic Study Results     Labs -  Recent Results (from the past 12 hour(s))   CARDIAC PANEL,(CK, CKMB & TROPONIN)    Collection Time: 05/25/20 12:13 PM   Result Value Ref Range    CK - MB 1.3 <3.6 ng/ml    CK-MB Index 0.8 0.0 - 4.0 %     26 - 192 U/L    Troponin-I, QT <0.02 0.0 - 0.045 NG/ML   EKG, 12 LEAD, INITIAL    Collection Time: 05/25/20  9:21 PM   Result Value Ref Range    Ventricular Rate 75 BPM    Atrial Rate 75 BPM    P-R Interval 142 ms    QRS Duration 126 ms    Q-T Interval 444 ms    QTC Calculation (Bezet) 495 ms    Calculated P Axis 74 degrees    Calculated R Axis -55 degrees    Calculated T Axis 102 degrees    Diagnosis       Normal sinus rhythm  Possible Left atrial enlargement  Left axis deviation  Left bundle branch block  Abnormal ECG  When compared with ECG of 25-MAY-2020 09:11,  Left bundle branch block has replaced Nonspecific intraventricular block  Criteria for Anterior infarct are no longer present  Criteria for Anterolateral infarct are no longer present         Radiologic Studies -   Xr Chest Port    Result Date: 5/25/2020  IMPRESSION: Mild diffuse prominence of the interstitial markings as described. No evidence of focal pulmonary consolidation. Medical Decision Making     ED Course: Progress Notes, Reevaluation, and Consults:    10:32 PM Initial assessment performed. The patients presenting problems have been discussed, and they/their family are in agreement with the care plan formulated and outlined with them. I have encouraged them to ask questions as they arise throughout their visit. Provider Notes (Medical Decision Making): 54-year-old female with multiple medical problems presenting with persistent symptoms of lightheadedness. She was seen yesterday and today and basic screening labs that did not show acute abnormalities. I think since this is the third visit for that and she is already due to get a CT scan we should get it done in the ER tonight. She does not have any focal neuro deficits on her exam so not concerned for stroke. Does not have any symptoms of vertigo either. Could be related to her poorly controlled blood pressure versus dehydration. Will get an EKG and a point-of-care troponin as well given her high risk. Patient is insisting on getting Percocet, she received multiple doses yesterday and today so I will not give her more narcotics. There is definitely pain seeking behavior. Procedures:     Critical Care Time:     Vital Signs-Reviewed the patient's vital signs. Reviewed pt's pulse ox reading. EKG: Interpreted by the EP.    Time Interpreted:    Rate:    Rhythm:    Interpretation:   Comparison:     Records Reviewed: Nursing Notes (Time of Review: 10:32 PM)  -I am the first provider for this patient.  -I reviewed the vital signs, available nursing notes, past medical history, past surgical history, family history and social history. Current Facility-Administered Medications   Medication Dose Route Frequency Provider Last Rate Last Dose    ketorolac (TORADOL) injection 15 mg  15 mg IntraVENous ONCE Nick Zarate MD        amLODIPine (NORVASC) tablet 10 mg  10 mg Oral NOW Nick Zarate MD         Current Outpatient Medications   Medication Sig Dispense Refill    albuterol (PROVENTIL HFA, VENTOLIN HFA, PROAIR HFA) 90 mcg/actuation inhaler Take 2 Puffs by inhalation every four (4) hours as needed for Wheezing. 1 Inhaler 0    albuterol (ACCUNEB) 1.25 mg/3 mL nebu Take 3 mL by inhalation every four (4) hours as needed for Wheezing (wheezing). 25 Each 0    albuterol (PROVENTIL HFA, VENTOLIN HFA, PROAIR HFA) 90 mcg/actuation inhaler Take 2 Puffs by inhalation every four (4) hours as needed for Wheezing or Shortness of Breath. Indications: asthma attack, chronic obstructive pulmonary disease 1 Inhaler 3    cloNIDine HCL (CATAPRES) 0.2 mg tablet Take 1 Tab by mouth two (2) times a day. 60 Tab 1    aspirin delayed-release 81 mg tablet Take 1 Tab by mouth daily. 30 Tab 0    ondansetron (ZOFRAN ODT) 4 mg disintegrating tablet Take 1 Tab by mouth every eight (8) hours as needed for Nausea. Indications: prevent nausea and vomiting after surgery 20 Tab 0    famotidine (PEPCID) 20 mg tablet Take 1 Tab by mouth daily. 30 Tab 1    OXYGEN-AIR DELIVERY SYSTEMS 3 L by IntraNASal route as needed for Other (sob).  docusate sodium (COLACE) 100 mg capsule Take 1 Cap by mouth two (2) times a day. 120 Cap 0    fluticasone furoate-vilanterol (BREO ELLIPTA) 200-25 mcg/dose inhaler Take 1 Puff by inhalation daily.  amLODIPine (NORVASC) 10 mg tablet Take 1 Tab by mouth daily.  30 Tab 0    arformoterol (BROVANA) 15 mcg/2 mL nebu neb solution 2 mL by Nebulization route two (2) times a day. 60 Vial 0    budesonide (PULMICORT) 0.5 mg/2 mL nbsp 2 mL by Nebulization route two (2) times a day. 60 Each 0    tiotropium (SPIRIVA) 18 mcg inhalation capsule Take 1 Cap by inhalation daily. 30 Cap 0    atorvastatin (LIPITOR) 20 mg tablet Take 1 Tab by mouth nightly. 30 Tab 0    escitalopram oxalate (LEXAPRO) 10 mg tablet Take 1 Tab by mouth every evening. 30 Tab 0    metoprolol tartrate (LOPRESSOR) 25 mg tablet Take 0.5 Tabs by mouth every twelve (12) hours. 30 Tab 0    roflumilast (DALIRESP) 500 mcg tab tablet Take 1 Tab by mouth daily. 30 Tab 0    OTHER Incentive spirometry- use as directed 1 Each 0    naloxone (NARCAN) 4 mg/actuation nasal spray Use 1 spray intranasally, then discard. Repeat with new spray every 2 min as needed for opioid overdose symptoms, alternating nostrils. 1 Each 0    montelukast (SINGULAIR) 10 mg tablet Take 1 Tab by mouth nightly. 30 Tab 1        Clinical Impression     Clinical Impression: No diagnosis found. Disposition: DC        This note was dictated utilizing voice recognition software which may lead to typographical errors. I apologize in advance if the situation occurs. If questions arise please do not hesitate to contact me or call our department.     Jojo De La Torre MD  10:32 PM

## 2020-05-26 NOTE — ED NOTES
Pt discharged to lobby. Escorted by security and tech. Pt expressed frustration at discharge. Complained of shortness of breath. Pt O2 saturation 100 percent standing without oxygen therapy. Pt waiting for medicab.

## 2020-05-26 NOTE — ED NOTES
Pt present for chest pain and SOB. She arrived via medic. Pt was seen earlier today, she states that there has been no worsening in SOB since she was previously seen today.

## 2020-05-26 NOTE — PROGRESS NOTES
.Date/Time:  5/26/2020 10:09 AM  Attempted to reach patient by telephone. Left HIPPA compliant message requesting a return call. Will attempt to reach patient again. Initial Call to patient for [facility] ED  For 5/24/20 chest pains and 5/25/20 SOB with chest pains.

## 2020-05-26 NOTE — DISCHARGE INSTRUCTIONS
Patient Education        Chronic Pain: Care Instructions  Your Care Instructions    Chronic pain is pain that lasts a long time (months or even years) and may or may not have a clear cause. It is different from acute pain, which usually does have a clear cause--like an injury or illness--and gets better over time. Chronic pain:  · Lasts over time but may vary from day to day. · Does not go away despite efforts to end it. · May disrupt your sleep and lead to fatigue. · May cause depression or anxiety. · May make your muscles tense, causing more pain. · Can disrupt your work, hobbies, home life, and relationships with friends and family. Chronic pain is a very real condition. It is not just in your head. Treatment can help and usually includes several methods used together, such as medicines, physical therapy, exercise, and other treatments. Learning how to relax and changing negative thought patterns can also help you cope. Chronic pain is complex. Taking an active role in your treatment will help you better manage your pain. Tell your doctor if you have trouble dealing with your pain. You may have to try several things before you find what works best for you. Follow-up care is a key part of your treatment and safety. Be sure to make and go to all appointments, and call your doctor if you are having problems. It's also a good idea to know your test results and keep a list of the medicines you take. How can you care for yourself at home? · Pace yourself. Break up large jobs into smaller tasks. Save harder tasks for days when you have less pain, or go back and forth between hard tasks and easier ones. Take rest breaks. · Relax, and reduce stress. Relaxation techniques such as deep breathing or meditation can help. · Keep moving. Gentle, daily exercise can help reduce pain over the long run. Try low- or no-impact exercises such as walking, swimming, and stationary biking.  Do stretches to stay flexible. · Try heat, cold packs, and massage. · Get enough sleep. Chronic pain can make you tired and drain your energy. Talk with your doctor if you have trouble sleeping because of pain. · Think positive. Your thoughts can affect your pain level. Do things that you enjoy to distract yourself when you have pain instead of focusing on the pain. See a movie, read a book, listen to music, or spend time with a friend. · If you think you are depressed, talk to your doctor about treatment. · Keep a daily pain diary. Record how your moods, thoughts, sleep patterns, activities, and medicine affect your pain. You may find that your pain is worse during or after certain activities or when you are feeling a certain emotion. Having a record of your pain can help you and your doctor find the best ways to treat your pain. · Take pain medicines exactly as directed. ? If the doctor gave you a prescription medicine for pain, take it as prescribed. ? If you are not taking a prescription pain medicine, ask your doctor if you can take an over-the-counter medicine. Reducing constipation caused by pain medicine  · Include fruits, vegetables, beans, and whole grains in your diet each day. These foods are high in fiber. · Drink plenty of fluids, enough so that your urine is light yellow or clear like water. If you have kidney, heart, or liver disease and have to limit fluids, talk with your doctor before you increase the amount of fluids you drink. · If your doctor recommends it, get more exercise. Walking is a good choice. Bit by bit, increase the amount you walk every day. Try for at least 30 minutes on most days of the week. · Schedule time each day for a bowel movement. A daily routine may help. Take your time and do not strain when having a bowel movement. When should you call for help?   Call your doctor now or seek immediate medical care if:    · Your pain gets worse or is out of control.     · You feel down or blue, or you do not enjoy things like you once did. You may be depressed, which is common in people with chronic pain. Depression can be treated.     · You have vomiting or cramps for more than 2 hours.    Watch closely for changes in your health, and be sure to contact your doctor if:    · You cannot sleep because of pain.     · You are very worried or anxious about your pain.     · You have trouble taking your pain medicine.     · You have any concerns about your pain medicine.     · You have trouble with bowel movements, such as:  ? No bowel movement in 3 days. ? Blood in the anal area, in your stool, or on the toilet paper. ? Diarrhea for more than 24 hours. Where can you learn more? Go to http://magda-kadie.info/  Enter N004 in the search box to learn more about \"Chronic Pain: Care Instructions. \"  Current as of: November 19, 2019Content Version: 12.4  © 1638-3532 [employer]. Care instructions adapted under license by [employer] (which disclaims liability or warranty for this information). If you have questions about a medical condition or this instruction, always ask your healthcare professional. [place] [address] any warranty or liability for your use of this information.

## 2020-05-29 ENCOUNTER — PATIENT OUTREACH (OUTPATIENT)
Dept: CASE MANAGEMENT | Age: 55
End: 2020-05-29

## 2020-05-29 NOTE — PROGRESS NOTES
.Date/Time:  5/29/2020 2:43 PM  Attempted to reach patient by telephone. Left HIPPA compliant message requesting a return call. 2ND OUTREACH CALL EPISODE IS CLOSED.

## 2020-05-30 ENCOUNTER — APPOINTMENT (OUTPATIENT)
Dept: GENERAL RADIOLOGY | Age: 55
End: 2020-05-30
Attending: PHYSICIAN ASSISTANT
Payer: MEDICAID

## 2020-05-30 ENCOUNTER — HOSPITAL ENCOUNTER (EMERGENCY)
Age: 55
Discharge: LEFT BEFORE DISCHARGE | End: 2020-05-30
Attending: EMERGENCY MEDICINE
Payer: MEDICAID

## 2020-05-30 ENCOUNTER — HOSPITAL ENCOUNTER (EMERGENCY)
Age: 55
Discharge: HOME | End: 2020-05-30
Attending: EMERGENCY MEDICINE
Payer: MEDICAID

## 2020-05-30 VITALS
OXYGEN SATURATION: 98 % | DIASTOLIC BLOOD PRESSURE: 84 MMHG | TEMPERATURE: 98.4 F | RESPIRATION RATE: 18 BRPM | SYSTOLIC BLOOD PRESSURE: 182 MMHG | HEART RATE: 68 BPM

## 2020-05-30 VITALS
DIASTOLIC BLOOD PRESSURE: 94 MMHG | SYSTOLIC BLOOD PRESSURE: 193 MMHG | BODY MASS INDEX: 32.25 KG/M2 | HEIGHT: 59 IN | RESPIRATION RATE: 16 BRPM | WEIGHT: 160 LBS | OXYGEN SATURATION: 98 % | HEART RATE: 71 BPM | TEMPERATURE: 97.1 F

## 2020-05-30 DIAGNOSIS — Z53.21 ELOPED FROM EMERGENCY DEPARTMENT: ICD-10-CM

## 2020-05-30 DIAGNOSIS — M54.12 CERVICAL RADICULOPATHY: Primary | ICD-10-CM

## 2020-05-30 DIAGNOSIS — R07.89 CHEST WALL PAIN: ICD-10-CM

## 2020-05-30 DIAGNOSIS — Z76.5 MALINGERING: ICD-10-CM

## 2020-05-30 DIAGNOSIS — R07.9 CHEST PAIN, UNSPECIFIED TYPE: Primary | ICD-10-CM

## 2020-05-30 LAB
ALBUMIN SERPL-MCNC: 3.7 G/DL (ref 3.4–5)
ALBUMIN/GLOB SERPL: 1.1 {RATIO} (ref 0.8–1.7)
ALP SERPL-CCNC: 82 U/L (ref 45–117)
ALT SERPL-CCNC: 16 U/L (ref 13–56)
ANION GAP SERPL CALC-SCNC: 0 MMOL/L (ref 3–18)
AST SERPL-CCNC: 10 U/L (ref 10–38)
BASOPHILS # BLD: 0 K/UL (ref 0–0.1)
BASOPHILS NFR BLD: 0 % (ref 0–2)
BILIRUB SERPL-MCNC: 0.3 MG/DL (ref 0.2–1)
BNP SERPL-MCNC: 346 PG/ML (ref 0–900)
BUN SERPL-MCNC: 17 MG/DL (ref 7–18)
BUN/CREAT SERPL: 18 (ref 12–20)
CALCIUM SERPL-MCNC: 8.4 MG/DL (ref 8.5–10.1)
CHLORIDE SERPL-SCNC: 108 MMOL/L (ref 100–111)
CO2 SERPL-SCNC: 34 MMOL/L (ref 21–32)
CREAT SERPL-MCNC: 0.96 MG/DL (ref 0.6–1.3)
DIFFERENTIAL METHOD BLD: NORMAL
EOSINOPHIL # BLD: 0.3 K/UL (ref 0–0.4)
EOSINOPHIL NFR BLD: 3 % (ref 0–5)
ERYTHROCYTE [DISTWIDTH] IN BLOOD BY AUTOMATED COUNT: 13.5 % (ref 11.6–14.5)
GLOBULIN SER CALC-MCNC: 3.4 G/DL (ref 2–4)
GLUCOSE SERPL-MCNC: 83 MG/DL (ref 74–99)
HCT VFR BLD AUTO: 39 % (ref 35–45)
HGB BLD-MCNC: 12.2 G/DL (ref 12–16)
LYMPHOCYTES # BLD: 2 K/UL (ref 0.9–3.6)
LYMPHOCYTES NFR BLD: 27 % (ref 21–52)
MCH RBC QN AUTO: 28.3 PG (ref 24–34)
MCHC RBC AUTO-ENTMCNC: 31.3 G/DL (ref 31–37)
MCV RBC AUTO: 90.5 FL (ref 74–97)
MONOCYTES # BLD: 0.4 K/UL (ref 0.05–1.2)
MONOCYTES NFR BLD: 6 % (ref 3–10)
NEUTS SEG # BLD: 4.7 K/UL (ref 1.8–8)
NEUTS SEG NFR BLD: 64 % (ref 40–73)
PLATELET # BLD AUTO: 240 K/UL (ref 135–420)
PMV BLD AUTO: 10.4 FL (ref 9.2–11.8)
POTASSIUM SERPL-SCNC: 3.6 MMOL/L (ref 3.5–5.5)
PROT SERPL-MCNC: 7.1 G/DL (ref 6.4–8.2)
RBC # BLD AUTO: 4.31 M/UL (ref 4.2–5.3)
SODIUM SERPL-SCNC: 142 MMOL/L (ref 136–145)
TROPONIN I SERPL-MCNC: <0.02 NG/ML (ref 0–0.04)
WBC # BLD AUTO: 7.3 K/UL (ref 4.6–13.2)

## 2020-05-30 PROCEDURE — 71045 X-RAY EXAM CHEST 1 VIEW: CPT

## 2020-05-30 PROCEDURE — 99283 EMERGENCY DEPT VISIT LOW MDM: CPT

## 2020-05-30 PROCEDURE — 93005 ELECTROCARDIOGRAM TRACING: CPT

## 2020-05-30 PROCEDURE — 85025 COMPLETE CBC W/AUTO DIFF WBC: CPT

## 2020-05-30 PROCEDURE — 83880 ASSAY OF NATRIURETIC PEPTIDE: CPT

## 2020-05-30 PROCEDURE — 74011250637 HC RX REV CODE- 250/637: Performed by: PHYSICIAN ASSISTANT

## 2020-05-30 PROCEDURE — 84484 ASSAY OF TROPONIN QUANT: CPT

## 2020-05-30 PROCEDURE — 74011636637 HC RX REV CODE- 636/637: Performed by: PHYSICIAN ASSISTANT

## 2020-05-30 PROCEDURE — 80053 COMPREHEN METABOLIC PANEL: CPT

## 2020-05-30 RX ORDER — PREDNISONE 20 MG/1
40 TABLET ORAL DAILY
Qty: 8 TAB | Refills: 0 | Status: SHIPPED | OUTPATIENT
Start: 2020-05-30 | End: 2020-06-03

## 2020-05-30 RX ORDER — OXYCODONE AND ACETAMINOPHEN 5; 325 MG/1; MG/1
1 TABLET ORAL
Status: DISCONTINUED | OUTPATIENT
Start: 2020-05-30 | End: 2020-05-30 | Stop reason: HOSPADM

## 2020-05-30 RX ORDER — PREDNISONE 20 MG/1
20 TABLET ORAL
Status: COMPLETED | OUTPATIENT
Start: 2020-05-30 | End: 2020-05-30

## 2020-05-30 RX ORDER — ASPIRIN 325 MG
325 TABLET ORAL
Status: COMPLETED | OUTPATIENT
Start: 2020-05-30 | End: 2020-05-30

## 2020-05-30 RX ORDER — ALBUTEROL SULFATE 90 UG/1
2 AEROSOL, METERED RESPIRATORY (INHALATION)
Qty: 1 INHALER | Refills: 0 | Status: SHIPPED | OUTPATIENT
Start: 2020-05-30 | End: 2020-06-22

## 2020-05-30 RX ADMIN — PREDNISONE 20 MG: 20 TABLET ORAL at 23:00

## 2020-05-30 RX ADMIN — ASPIRIN 325 MG ORAL TABLET 325 MG: 325 PILL ORAL at 19:56

## 2020-05-31 LAB
ATRIAL RATE: 70 BPM
ATRIAL RATE: 71 BPM
CALCULATED P AXIS, ECG09: 77 DEGREES
CALCULATED P AXIS, ECG09: 77 DEGREES
CALCULATED R AXIS, ECG10: 2 DEGREES
CALCULATED R AXIS, ECG10: 47 DEGREES
CALCULATED T AXIS, ECG11: -97 DEGREES
CALCULATED T AXIS, ECG11: 123 DEGREES
DIAGNOSIS, 93000: NORMAL
DIAGNOSIS, 93000: NORMAL
P-R INTERVAL, ECG05: 138 MS
P-R INTERVAL, ECG05: 138 MS
Q-T INTERVAL, ECG07: 452 MS
Q-T INTERVAL, ECG07: 460 MS
QRS DURATION, ECG06: 134 MS
QRS DURATION, ECG06: 88 MS
QTC CALCULATION (BEZET), ECG08: 491 MS
QTC CALCULATION (BEZET), ECG08: 496 MS
VENTRICULAR RATE, ECG03: 70 BPM
VENTRICULAR RATE, ECG03: 71 BPM

## 2020-05-31 NOTE — ED TRIAGE NOTES
Pt eloped 2 hours earlier, pt checked back in stated she was still having chest pain, pt is in no acute distress at this time.

## 2020-05-31 NOTE — ED PROVIDER NOTES
EMERGENCY DEPARTMENT HISTORY AND PHYSICAL EXAM    Date: 5/30/2020  Patient Name: Calli Gray    History of Presenting Illness     Chief Complaint   Patient presents with    Chest Pain         History Provided By: Patient        Additional History (Context): Calli Gray is a 53 y.o. female with Previous history of COPD, current tobacco use, essential hypertension, hypertensive heart failure, asthma, use of home O2 who returns to the ER after eloping 1 hour ago today continuing to complain of left side chest pain with radiation of pain down the left arm since this morning. Patient was seen earlier on the shift by this provider the patient became upset with the nursing staff and eloped from the ER. Patient denies numbness tingling or paresthesias of the left arm. No known history of neck problems. PCP: Manish Ma NP    Current Outpatient Medications   Medication Sig Dispense Refill    predniSONE (DELTASONE) 20 mg tablet Take 40 mg by mouth daily for 4 days. With Breakfast 8 Tab 0    albuterol (PROVENTIL HFA, VENTOLIN HFA, PROAIR HFA) 90 mcg/actuation inhaler Take 2 Puffs by inhalation every four (4) hours as needed for Wheezing. 1 Inhaler 0    albuterol (ACCUNEB) 1.25 mg/3 mL nebu Take 3 mL by inhalation every four (4) hours as needed for Wheezing (wheezing). 25 Each 0    albuterol (PROVENTIL HFA, VENTOLIN HFA, PROAIR HFA) 90 mcg/actuation inhaler Take 2 Puffs by inhalation every four (4) hours as needed for Wheezing or Shortness of Breath. Indications: asthma attack, chronic obstructive pulmonary disease 1 Inhaler 3    cloNIDine HCL (CATAPRES) 0.2 mg tablet Take 1 Tab by mouth two (2) times a day. 60 Tab 1    aspirin delayed-release 81 mg tablet Take 1 Tab by mouth daily. 30 Tab 0    ondansetron (ZOFRAN ODT) 4 mg disintegrating tablet Take 1 Tab by mouth every eight (8) hours as needed for Nausea.  Indications: prevent nausea and vomiting after surgery 20 Tab 0    famotidine (PEPCID) 20 mg tablet Take 1 Tab by mouth daily. 30 Tab 1    OXYGEN-AIR DELIVERY SYSTEMS 3 L by IntraNASal route as needed for Other (sob).  docusate sodium (COLACE) 100 mg capsule Take 1 Cap by mouth two (2) times a day. 120 Cap 0    fluticasone furoate-vilanterol (BREO ELLIPTA) 200-25 mcg/dose inhaler Take 1 Puff by inhalation daily.  amLODIPine (NORVASC) 10 mg tablet Take 1 Tab by mouth daily. 30 Tab 0    arformoterol (BROVANA) 15 mcg/2 mL nebu neb solution 2 mL by Nebulization route two (2) times a day. 60 Vial 0    budesonide (PULMICORT) 0.5 mg/2 mL nbsp 2 mL by Nebulization route two (2) times a day. 60 Each 0    tiotropium (SPIRIVA) 18 mcg inhalation capsule Take 1 Cap by inhalation daily. 30 Cap 0    atorvastatin (LIPITOR) 20 mg tablet Take 1 Tab by mouth nightly. 30 Tab 0    escitalopram oxalate (LEXAPRO) 10 mg tablet Take 1 Tab by mouth every evening. 30 Tab 0    metoprolol tartrate (LOPRESSOR) 25 mg tablet Take 0.5 Tabs by mouth every twelve (12) hours. 30 Tab 0    roflumilast (DALIRESP) 500 mcg tab tablet Take 1 Tab by mouth daily. 30 Tab 0    OTHER Incentive spirometry- use as directed 1 Each 0    naloxone (NARCAN) 4 mg/actuation nasal spray Use 1 spray intranasally, then discard. Repeat with new spray every 2 min as needed for opioid overdose symptoms, alternating nostrils. 1 Each 0    montelukast (SINGULAIR) 10 mg tablet Take 1 Tab by mouth nightly.  30 Tab 1       Past History     Past Medical History:  Past Medical History:   Diagnosis Date    Asthma     CHF (congestive heart failure) (HCC)     Chronic obstructive pulmonary disease (HCC)     Dependence on supplemental oxygen     Endocrine disease     thyroid issues    Gastrointestinal disorder     \"blockage in my stomach\"    Hypercholesterolemia     Hypertension        Past Surgical History:  Past Surgical History:   Procedure Laterality Date    HX GI      Exploratory laparotomy with lysis of adhesions and primary repair of incarcerated umbilical hernia       Family History:  History reviewed. No pertinent family history. Social History:  Social History     Tobacco Use    Smoking status: Current Every Day Smoker     Packs/day: 0.25    Smokeless tobacco: Former User   Substance Use Topics    Alcohol use: No     Comment: socially    Drug use: Not Currently     Types: Heroin       Allergies:  No Known Allergies      Review of Systems   Review of Systems  Review of Systems   Constitutional: Negative for fatigue and fever. HENT: Negative for congestion.    Respiratory: Negative for cough; at baseline  shortness of breath.    Cardiovascular: Positive for chest pain. Gastrointestinal: Negative for abdominal pain, diarrhea, nausea and vomiting. Genitourinary: Negative for difficulty urinating and dysuria. Musculoskeletal: Negative joint pain, joint swelling, recent injury. Radicular pain down the left arm. Skin: Negative for wound. Neurological: Negative for dizziness and headaches.   All other systems reviewed and are negative. All Other Systems Negative  Physical Exam     Vitals:    05/30/20 2219   BP: 182/84   Pulse: 68   Resp: 18   Temp: 98.4 °F (36.9 °C)   SpO2: 98%     Physical Exam     Constitutional: Pt is oriented to person, place, and time. Pt appears well-developed and well-nourished. HENT:   Head: Normocephalic and atraumatic. Neck: Normal range of motion. Neck supple. No tracheal deviation present. No thyromegaly present. Vertebral spine is not TTP, there is no spasm present. Strength is 5/5 and equal DTRs are intact. Cardiovascular: Normal rate, regular rhythm and normal heart sounds. No murmur heard. Pulmonary/Chest: Effort normal and breath sounds normal. No respiratory distress. No wheezes or rales. Reproducible anterior chest wall pain. Abdominal: Soft. no distension and no mass. There is no tenderness. There is no rebound and no guarding. Musculoskeletal: Normal range of motion.  No edema or deformity. Neurological: Pt is alert and oriented to person, place, and time   Skin: Skin is warm and dry. Psychiatric: Pt has a normal mood and affect;  behavior is normal. Judgment and thought content normal.           Diagnostic Study Results     Labs -     Recent Results (from the past 12 hour(s))   EKG, 12 LEAD, INITIAL    Collection Time: 05/30/20  8:02 PM   Result Value Ref Range    Ventricular Rate 70 BPM    Atrial Rate 70 BPM    P-R Interval 138 ms    QRS Duration 134 ms    Q-T Interval 460 ms    QTC Calculation (Bezet) 496 ms    Calculated P Axis 77 degrees    Calculated R Axis 2 degrees    Calculated T Axis 123 degrees    Diagnosis       Sinus rhythm with occasional premature ventricular complexes  Possible Left atrial enlargement  Left bundle branch block  Abnormal ECG  When compared with ECG of 25-MAY-2020 21:21,  premature ventricular complexes are now present  QRS axis shifted right     CBC WITH AUTOMATED DIFF    Collection Time: 05/30/20  8:07 PM   Result Value Ref Range    WBC 7.3 4.6 - 13.2 K/uL    RBC 4.31 4.20 - 5.30 M/uL    HGB 12.2 12.0 - 16.0 g/dL    HCT 39.0 35.0 - 45.0 %    MCV 90.5 74.0 - 97.0 FL    MCH 28.3 24.0 - 34.0 PG    MCHC 31.3 31.0 - 37.0 g/dL    RDW 13.5 11.6 - 14.5 %    PLATELET 959 080 - 970 K/uL    MPV 10.4 9.2 - 11.8 FL    NEUTROPHILS 64 40 - 73 %    LYMPHOCYTES 27 21 - 52 %    MONOCYTES 6 3 - 10 %    EOSINOPHILS 3 0 - 5 %    BASOPHILS 0 0 - 2 %    ABS. NEUTROPHILS 4.7 1.8 - 8.0 K/UL    ABS. LYMPHOCYTES 2.0 0.9 - 3.6 K/UL    ABS. MONOCYTES 0.4 0.05 - 1.2 K/UL    ABS. EOSINOPHILS 0.3 0.0 - 0.4 K/UL    ABS.  BASOPHILS 0.0 0.0 - 0.1 K/UL    DF AUTOMATED     TROPONIN I    Collection Time: 05/30/20  8:07 PM   Result Value Ref Range    Troponin-I, QT <0.02 0.0 - 0.045 NG/ML   NT-PRO BNP    Collection Time: 05/30/20  8:07 PM   Result Value Ref Range    NT pro- 0 - 000 PG/ML   METABOLIC PANEL, COMPREHENSIVE    Collection Time: 05/30/20  8:07 PM   Result Value Ref Range Sodium 142 136 - 145 mmol/L    Potassium 3.6 3.5 - 5.5 mmol/L    Chloride 108 100 - 111 mmol/L    CO2 34 (H) 21 - 32 mmol/L    Anion gap 0 (L) 3.0 - 18 mmol/L    Glucose 83 74 - 99 mg/dL    BUN 17 7.0 - 18 MG/DL    Creatinine 0.96 0.6 - 1.3 MG/DL    BUN/Creatinine ratio 18 12 - 20      GFR est AA >60 >60 ml/min/1.73m2    GFR est non-AA >60 >60 ml/min/1.73m2    Calcium 8.4 (L) 8.5 - 10.1 MG/DL    Bilirubin, total 0.3 0.2 - 1.0 MG/DL    ALT (SGPT) 16 13 - 56 U/L    AST (SGOT) 10 10 - 38 U/L    Alk. phosphatase 82 45 - 117 U/L    Protein, total 7.1 6.4 - 8.2 g/dL    Albumin 3.7 3.4 - 5.0 g/dL    Globulin 3.4 2.0 - 4.0 g/dL    A-G Ratio 1.1 0.8 - 1.7     EKG, 12 LEAD, INITIAL    Collection Time: 05/30/20  9:56 PM   Result Value Ref Range    Ventricular Rate 71 BPM    Atrial Rate 71 BPM    P-R Interval 138 ms    QRS Duration 88 ms    Q-T Interval 452 ms    QTC Calculation (Bezet) 491 ms    Calculated P Axis 77 degrees    Calculated R Axis 47 degrees    Calculated T Axis -97 degrees    Diagnosis       Sinus rhythm with frequent and consecutive premature ventricular complexes   and fusion complexes  Possible Left atrial enlargement  T wave abnormality, consider anterolateral ischemia  Prolonged QT  Abnormal ECG  When compared with ECG of 30-MAY-2020 20:02,  fusion complexes are now present  Left bundle branch block is no longer present         Radiologic Studies -   XR CHEST PORT    (Results Pending)     CT Results  (Last 48 hours)    None        CXR Results  (Last 48 hours)    None            Medical Decision Making   I am the first provider for this patient. I reviewed the vital signs, available nursing notes, past medical history, past surgical history, family history and social history. Vital Signs-Reviewed the patient's vital signs.        Comparison:    Records Reviewed: Nursing Notes, Old Medical Records and Previous Laboratory Studies    Procedures:  Procedures    Provider Notes (Medical Decision Making): Patient's laboratory work from earlier work-up was reviewed troponin was found to be within normal range. CBC within normal limits. O2 saturation 98%. Pt in no acute distress upon return to the ED. Earlier labs are reassuring. I discussed appropriate follow up with cardiology. MED RECONCILIATION:  No current facility-administered medications for this encounter. Current Outpatient Medications   Medication Sig    predniSONE (DELTASONE) 20 mg tablet Take 40 mg by mouth daily for 4 days. With Breakfast    albuterol (PROVENTIL HFA, VENTOLIN HFA, PROAIR HFA) 90 mcg/actuation inhaler Take 2 Puffs by inhalation every four (4) hours as needed for Wheezing.  albuterol (ACCUNEB) 1.25 mg/3 mL nebu Take 3 mL by inhalation every four (4) hours as needed for Wheezing (wheezing).  albuterol (PROVENTIL HFA, VENTOLIN HFA, PROAIR HFA) 90 mcg/actuation inhaler Take 2 Puffs by inhalation every four (4) hours as needed for Wheezing or Shortness of Breath. Indications: asthma attack, chronic obstructive pulmonary disease    cloNIDine HCL (CATAPRES) 0.2 mg tablet Take 1 Tab by mouth two (2) times a day.  aspirin delayed-release 81 mg tablet Take 1 Tab by mouth daily.  ondansetron (ZOFRAN ODT) 4 mg disintegrating tablet Take 1 Tab by mouth every eight (8) hours as needed for Nausea. Indications: prevent nausea and vomiting after surgery    famotidine (PEPCID) 20 mg tablet Take 1 Tab by mouth daily.  OXYGEN-AIR DELIVERY SYSTEMS 3 L by IntraNASal route as needed for Other (sob).  docusate sodium (COLACE) 100 mg capsule Take 1 Cap by mouth two (2) times a day.  fluticasone furoate-vilanterol (BREO ELLIPTA) 200-25 mcg/dose inhaler Take 1 Puff by inhalation daily.  amLODIPine (NORVASC) 10 mg tablet Take 1 Tab by mouth daily.  arformoterol (BROVANA) 15 mcg/2 mL nebu neb solution 2 mL by Nebulization route two (2) times a day.     budesonide (PULMICORT) 0.5 mg/2 mL nbsp 2 mL by Nebulization route two (2) times a day.  tiotropium (SPIRIVA) 18 mcg inhalation capsule Take 1 Cap by inhalation daily.  atorvastatin (LIPITOR) 20 mg tablet Take 1 Tab by mouth nightly.  escitalopram oxalate (LEXAPRO) 10 mg tablet Take 1 Tab by mouth every evening.  metoprolol tartrate (LOPRESSOR) 25 mg tablet Take 0.5 Tabs by mouth every twelve (12) hours.  roflumilast (DALIRESP) 500 mcg tab tablet Take 1 Tab by mouth daily.  OTHER Incentive spirometry- use as directed    naloxone (NARCAN) 4 mg/actuation nasal spray Use 1 spray intranasally, then discard. Repeat with new spray every 2 min as needed for opioid overdose symptoms, alternating nostrils.  montelukast (SINGULAIR) 10 mg tablet Take 1 Tab by mouth nightly. Disposition:  Home    DISCHARGE NOTE:     Patient seen, examined and discharged from triage. Patient has no other complaints, changes, or physical findings. Care plan outlined and precautions discussed. Results of exam were reviewed with the patient. All medications were reviewed with the patient; will d/c home with follow up instructions. All of pt's questions and concerns were addressed. Patient was instructed and agrees to follow up with primary care, as well as to return to the ED upon further deterioration. Patient is ready to go home. Follow-up Information     Follow up With Specialties Details Why Contact Info Emmalene Leyden, NP Nurse Practitioner Schedule an appointment as soon as possible for a visit  [facility] [address]  [phone]      [facility] EMERGENCY DEPT Emergency Medicine  If symptoms worsen [address]  [phone]          Discharge Medication List as of 5/30/2020 10:52 PM      START taking these medications    Details   predniSONE (DELTASONE) 20 mg tablet Take 40 mg by mouth daily for 4 days.  With Breakfast, Normal, Disp-8 Tab, R-0         CONTINUE these medications which have NOT CHANGED    Details   !! albuterol (PROVENTIL HFA, VENTOLIN HFA, PROAIR HFA) 90 mcg/actuation inhaler Take 2 Puffs by inhalation every four (4) hours as needed for Wheezing., Print, Disp-1 Inhaler, R-0      albuterol (ACCUNEB) 1.25 mg/3 mL nebu Take 3 mL by inhalation every four (4) hours as needed for Wheezing (wheezing). , Print, Disp-25 Each, R-0      !! albuterol (PROVENTIL HFA, VENTOLIN HFA, PROAIR HFA) 90 mcg/actuation inhaler Take 2 Puffs by inhalation every four (4) hours as needed for Wheezing or Shortness of Breath. Indications: asthma attack, chronic obstructive pulmonary disease, Print, Disp-1 Inhaler, R-3      cloNIDine HCL (CATAPRES) 0.2 mg tablet Take 1 Tab by mouth two (2) times a day., Print, Disp-60 Tab, R-1      aspirin delayed-release 81 mg tablet Take 1 Tab by mouth daily. , Print, Disp-30 Tab, R-0      ondansetron (ZOFRAN ODT) 4 mg disintegrating tablet Take 1 Tab by mouth every eight (8) hours as needed for Nausea. Indications: prevent nausea and vomiting after surgery, Print, Disp-20 Tab, R-0      famotidine (PEPCID) 20 mg tablet Take 1 Tab by mouth daily. , Print, Disp-30 Tab, R-1      OXYGEN-AIR DELIVERY SYSTEMS 3 L by IntraNASal route as needed for Other (sob). , Historical Med      docusate sodium (COLACE) 100 mg capsule Take 1 Cap by mouth two (2) times a day., Normal, Disp-120 Cap, R-0      fluticasone furoate-vilanterol (BREO ELLIPTA) 200-25 mcg/dose inhaler Take 1 Puff by inhalation daily. , Historical Med      amLODIPine (NORVASC) 10 mg tablet Take 1 Tab by mouth daily. , Print, Disp-30 Tab, R-0      arformoterol (BROVANA) 15 mcg/2 mL nebu neb solution 2 mL by Nebulization route two (2) times a day., Print, Disp-60 Vial, R-0      budesonide (PULMICORT) 0.5 mg/2 mL nbsp 2 mL by Nebulization route two (2) times a day., Print, Disp-60 Each, R-0      tiotropium (SPIRIVA) 18 mcg inhalation capsule Take 1 Cap by inhalation daily. , Print, Disp-30 Cap, R-0      atorvastatin (LIPITOR) 20 mg tablet Take 1 Tab by mouth nightly. , Print, Disp-30 Tab, R-0      escitalopram oxalate (LEXAPRO) 10 mg tablet Take 1 Tab by mouth every evening., Print, Disp-30 Tab, R-0      metoprolol tartrate (LOPRESSOR) 25 mg tablet Take 0.5 Tabs by mouth every twelve (12) hours. , Print, Disp-30 Tab, R-0      roflumilast (DALIRESP) 500 mcg tab tablet Take 1 Tab by mouth daily. , Print, Disp-30 Tab, R-0      OTHER Incentive spirometry- use as directed, Print, Disp-1 Each, R-0      naloxone (NARCAN) 4 mg/actuation nasal spray Use 1 spray intranasally, then discard. Repeat with new spray every 2 min as needed for opioid overdose symptoms, alternating nostrils. , Print, Disp-1 Each, R-0      montelukast (SINGULAIR) 10 mg tablet Take 1 Tab by mouth nightly. , Print, Disp-30 Tab, R-1       !! - Potential duplicate medications found. Please discuss with provider. Diagnosis     Clinical Impression:   1. Cervical radiculopathy    2.  Chest wall pain

## 2020-05-31 NOTE — ED PROVIDER NOTES
EMERGENCY DEPARTMENT HISTORY AND PHYSICAL EXAM    Date: 5/30/2020  Patient Name: Tracey Wren    History of Presenting Illness     Chief Complaint   Patient presents with    Chest Pain         History Provided By: Patient        Additional History (Context): Tracey Wren is a 53 y.o. female with He has medical history of COPD, current tobacco use, essential hypertension, hypertensive heart failure, asthma, currently on oxygen therapy for a little liters per minute at home who presents with sided chest pain which started this morning when the patient woke. Patient describes the chest pain as something heavy sitting on her chest.  Patient patient denies recent cough, she does have baseline shortness of breath secondary to COPD and continues to use tobacco \"occasionally\". Patient is known well by the ER staff secondary to multiple visits for atypical chest pain, heroin abuse, chest pain, and COPD exacerbations. Despite multiple visits to the ER for chest pain complaints patient has not followed up with her cardiologist.    PCP: Enedelia Dupont NP    Current Facility-Administered Medications   Medication Dose Route Frequency Provider Last Rate Last Dose    oxyCODONE-acetaminophen (PERCOCET) 5-325 mg per tablet 1 Tab  1 Tab Oral NOW Blanca Rush, PA         Current Outpatient Medications   Medication Sig Dispense Refill    albuterol (PROVENTIL HFA, VENTOLIN HFA, PROAIR HFA) 90 mcg/actuation inhaler Take 2 Puffs by inhalation every four (4) hours as needed for Wheezing. 1 Inhaler 0    albuterol (ACCUNEB) 1.25 mg/3 mL nebu Take 3 mL by inhalation every four (4) hours as needed for Wheezing (wheezing). 25 Each 0    albuterol (PROVENTIL HFA, VENTOLIN HFA, PROAIR HFA) 90 mcg/actuation inhaler Take 2 Puffs by inhalation every four (4) hours as needed for Wheezing or Shortness of Breath.  Indications: asthma attack, chronic obstructive pulmonary disease 1 Inhaler 3    cloNIDine HCL (CATAPRES) 0.2 mg tablet Take 1 Tab by mouth two (2) times a day. 60 Tab 1    aspirin delayed-release 81 mg tablet Take 1 Tab by mouth daily. 30 Tab 0    ondansetron (ZOFRAN ODT) 4 mg disintegrating tablet Take 1 Tab by mouth every eight (8) hours as needed for Nausea. Indications: prevent nausea and vomiting after surgery 20 Tab 0    famotidine (PEPCID) 20 mg tablet Take 1 Tab by mouth daily. 30 Tab 1    OXYGEN-AIR DELIVERY SYSTEMS 3 L by IntraNASal route as needed for Other (sob).  docusate sodium (COLACE) 100 mg capsule Take 1 Cap by mouth two (2) times a day. 120 Cap 0    fluticasone furoate-vilanterol (BREO ELLIPTA) 200-25 mcg/dose inhaler Take 1 Puff by inhalation daily.  amLODIPine (NORVASC) 10 mg tablet Take 1 Tab by mouth daily. 30 Tab 0    arformoterol (BROVANA) 15 mcg/2 mL nebu neb solution 2 mL by Nebulization route two (2) times a day. 60 Vial 0    budesonide (PULMICORT) 0.5 mg/2 mL nbsp 2 mL by Nebulization route two (2) times a day. 60 Each 0    tiotropium (SPIRIVA) 18 mcg inhalation capsule Take 1 Cap by inhalation daily. 30 Cap 0    atorvastatin (LIPITOR) 20 mg tablet Take 1 Tab by mouth nightly. 30 Tab 0    escitalopram oxalate (LEXAPRO) 10 mg tablet Take 1 Tab by mouth every evening. 30 Tab 0    metoprolol tartrate (LOPRESSOR) 25 mg tablet Take 0.5 Tabs by mouth every twelve (12) hours. 30 Tab 0    roflumilast (DALIRESP) 500 mcg tab tablet Take 1 Tab by mouth daily. 30 Tab 0    OTHER Incentive spirometry- use as directed 1 Each 0    naloxone (NARCAN) 4 mg/actuation nasal spray Use 1 spray intranasally, then discard. Repeat with new spray every 2 min as needed for opioid overdose symptoms, alternating nostrils. 1 Each 0    montelukast (SINGULAIR) 10 mg tablet Take 1 Tab by mouth nightly.  30 Tab 1       Past History     Past Medical History:  Past Medical History:   Diagnosis Date    Asthma     CHF (congestive heart failure) (HCC)     Chronic obstructive pulmonary disease (HCC)     Dependence on supplemental oxygen     Endocrine disease     thyroid issues    Gastrointestinal disorder     \"blockage in my stomach\"    Hypercholesterolemia     Hypertension        Past Surgical History:  Past Surgical History:   Procedure Laterality Date    HX GI      Exploratory laparotomy with lysis of adhesions and primary repair of incarcerated umbilical hernia       Family History:  No family history on file. Social History:  Social History     Tobacco Use    Smoking status: Current Every Day Smoker     Packs/day: 0.25    Smokeless tobacco: Former User   Substance Use Topics    Alcohol use: No     Comment: socially    Drug use: Not Currently     Types: Heroin       Allergies:  No Known Allergies      Review of Systems   Review of Systems  Review of Systems   Constitutional: Negative for fatigue and fever. HENT: Negative for congestion. Respiratory: Negative for cough; at baseline  shortness of breath. Cardiovascular: Positive for chest pain. Gastrointestinal: Negative for abdominal pain, diarrhea, nausea and vomiting. Genitourinary: Negative for difficulty urinating and dysuria. Musculoskeletal: Negative joint pain, joint swelling, recent injury. Skin: Negative for wound. Neurological: Negative for dizziness and headaches. All other systems reviewed and are negative. All Other Systems Negative  Physical Exam     Vitals:    05/30/20 1915 05/30/20 1924 05/30/20 1930   BP: 143/72 143/72 (!) 193/94   Pulse:  72 71   Resp:  20 16   Temp:  97.1 °F (36.2 °C)    SpO2:  97% 98%   Weight:  72.6 kg (160 lb)    Height:  4' 11\" (1.499 m)      Physical Exam     Constitutional: Pt is oriented to person, place, and time. Pt appears well-developed and well-nourished. HENT:   Head: Normocephalic and atraumatic. Mouth/Throat: Oropharynx is clear and moist.   Eyes: Pupils are equal, round, and reactive to light. Neck: Normal range of motion. Neck supple. No tracheal deviation present.  No thyromegaly present. Cardiovascular: Normal rate, regular rhythm and normal heart sounds. No murmur heard. Pulmonary/Chest: Effort normal and breath sounds normal. No respiratory distress. No wheezes or rales. On O2 via cannula 4 LPM. Anterior chest wall pain is reproducible. Abdominal: Soft. No distension and no mass. There is no tenderness. There is no rebound and no guarding. Musculoskeletal: Normal range of motion. No edema or deformity. Neurological: Pt is alert and oriented to person, place, and time   Skin: Skin is warm and dry. No diaphoresis. Psychiatric: Pt has a normal mood and affect;  behavior is normal. Judgment and thought content normal.           Diagnostic Study Results     Labs -     Recent Results (from the past 12 hour(s))   EKG, 12 LEAD, INITIAL    Collection Time: 05/30/20  8:02 PM   Result Value Ref Range    Ventricular Rate 70 BPM    Atrial Rate 70 BPM    P-R Interval 138 ms    QRS Duration 134 ms    Q-T Interval 460 ms    QTC Calculation (Bezet) 496 ms    Calculated P Axis 77 degrees    Calculated R Axis 2 degrees    Calculated T Axis 123 degrees    Diagnosis       Sinus rhythm with occasional premature ventricular complexes  Possible Left atrial enlargement  Left bundle branch block  Abnormal ECG  When compared with ECG of 25-MAY-2020 21:21,  premature ventricular complexes are now present  QRS axis shifted right     CBC WITH AUTOMATED DIFF    Collection Time: 05/30/20  8:07 PM   Result Value Ref Range    WBC 7.3 4.6 - 13.2 K/uL    RBC 4.31 4.20 - 5.30 M/uL    HGB 12.2 12.0 - 16.0 g/dL    HCT 39.0 35.0 - 45.0 %    MCV 90.5 74.0 - 97.0 FL    MCH 28.3 24.0 - 34.0 PG    MCHC 31.3 31.0 - 37.0 g/dL    RDW 13.5 11.6 - 14.5 %    PLATELET 899 010 - 082 K/uL    MPV 10.4 9.2 - 11.8 FL    NEUTROPHILS 64 40 - 73 %    LYMPHOCYTES 27 21 - 52 %    MONOCYTES 6 3 - 10 %    EOSINOPHILS 3 0 - 5 %    BASOPHILS 0 0 - 2 %    ABS. NEUTROPHILS 4.7 1.8 - 8.0 K/UL    ABS. LYMPHOCYTES 2.0 0.9 - 3.6 K/UL    ABS. MONOCYTES 0.4 0.05 - 1.2 K/UL    ABS. EOSINOPHILS 0.3 0.0 - 0.4 K/UL    ABS. BASOPHILS 0.0 0.0 - 0.1 K/UL    DF AUTOMATED     TROPONIN I    Collection Time: 05/30/20  8:07 PM   Result Value Ref Range    Troponin-I, QT <0.02 0.0 - 0.045 NG/ML   NT-PRO BNP    Collection Time: 05/30/20  8:07 PM   Result Value Ref Range    NT pro- 0 - 063 PG/ML   METABOLIC PANEL, COMPREHENSIVE    Collection Time: 05/30/20  8:07 PM   Result Value Ref Range    Sodium 142 136 - 145 mmol/L    Potassium 3.6 3.5 - 5.5 mmol/L    Chloride 108 100 - 111 mmol/L    CO2 34 (H) 21 - 32 mmol/L    Anion gap 0 (L) 3.0 - 18 mmol/L    Glucose 83 74 - 99 mg/dL    BUN 17 7.0 - 18 MG/DL    Creatinine 0.96 0.6 - 1.3 MG/DL    BUN/Creatinine ratio 18 12 - 20      GFR est AA >60 >60 ml/min/1.73m2    GFR est non-AA >60 >60 ml/min/1.73m2    Calcium 8.4 (L) 8.5 - 10.1 MG/DL    Bilirubin, total 0.3 0.2 - 1.0 MG/DL    ALT (SGPT) 16 13 - 56 U/L    AST (SGOT) 10 10 - 38 U/L    Alk. phosphatase 82 45 - 117 U/L    Protein, total 7.1 6.4 - 8.2 g/dL    Albumin 3.7 3.4 - 5.0 g/dL    Globulin 3.4 2.0 - 4.0 g/dL    A-G Ratio 1.1 0.8 - 1.7         Radiologic Studies -   No orders to display     CT Results  (Last 48 hours)    None        CXR Results  (Last 48 hours)    None            Medical Decision Making   I am the first provider for this patient. I reviewed the vital signs, available nursing notes, past medical history, past surgical history, family history and social history. Vital Signs-Reviewed the patient's vital signs. Comparison:    Records Reviewed: Nursing Notes, Old Medical Records, Previous electrocardiograms, Previous Radiology Studies and Previous Laboratory Studies    Procedures:  Procedures    Provider Notes (Medical Decision Making):   Patient in no acute distress upon initial assessment. Patient is ultra focused on narcotic pain relievers for her chest pain.   Suspect some malingering involved given multiple ED visits for chest pain without cardiology follow-up or work-up. Initial orders include CBC, CMP, troponin, BNP, CXR ASA and continued o2 via cannula. Patient initially declined ASA. Pt wants Morphine. Pt eloped after assessment. MED RECONCILIATION:  Current Facility-Administered Medications   Medication Dose Route Frequency    oxyCODONE-acetaminophen (PERCOCET) 5-325 mg per tablet 1 Tab  1 Tab Oral NOW     Current Outpatient Medications   Medication Sig    albuterol (PROVENTIL HFA, VENTOLIN HFA, PROAIR HFA) 90 mcg/actuation inhaler Take 2 Puffs by inhalation every four (4) hours as needed for Wheezing.  albuterol (ACCUNEB) 1.25 mg/3 mL nebu Take 3 mL by inhalation every four (4) hours as needed for Wheezing (wheezing).  albuterol (PROVENTIL HFA, VENTOLIN HFA, PROAIR HFA) 90 mcg/actuation inhaler Take 2 Puffs by inhalation every four (4) hours as needed for Wheezing or Shortness of Breath. Indications: asthma attack, chronic obstructive pulmonary disease    cloNIDine HCL (CATAPRES) 0.2 mg tablet Take 1 Tab by mouth two (2) times a day.  aspirin delayed-release 81 mg tablet Take 1 Tab by mouth daily.  ondansetron (ZOFRAN ODT) 4 mg disintegrating tablet Take 1 Tab by mouth every eight (8) hours as needed for Nausea. Indications: prevent nausea and vomiting after surgery    famotidine (PEPCID) 20 mg tablet Take 1 Tab by mouth daily.  OXYGEN-AIR DELIVERY SYSTEMS 3 L by IntraNASal route as needed for Other (sob).  docusate sodium (COLACE) 100 mg capsule Take 1 Cap by mouth two (2) times a day.  fluticasone furoate-vilanterol (BREO ELLIPTA) 200-25 mcg/dose inhaler Take 1 Puff by inhalation daily.  amLODIPine (NORVASC) 10 mg tablet Take 1 Tab by mouth daily.  arformoterol (BROVANA) 15 mcg/2 mL nebu neb solution 2 mL by Nebulization route two (2) times a day.  budesonide (PULMICORT) 0.5 mg/2 mL nbsp 2 mL by Nebulization route two (2) times a day.     tiotropium (SPIRIVA) 18 mcg inhalation capsule Take 1 Cap by inhalation daily.  atorvastatin (LIPITOR) 20 mg tablet Take 1 Tab by mouth nightly.  escitalopram oxalate (LEXAPRO) 10 mg tablet Take 1 Tab by mouth every evening.  metoprolol tartrate (LOPRESSOR) 25 mg tablet Take 0.5 Tabs by mouth every twelve (12) hours.  roflumilast (DALIRESP) 500 mcg tab tablet Take 1 Tab by mouth daily.  OTHER Incentive spirometry- use as directed    naloxone (NARCAN) 4 mg/actuation nasal spray Use 1 spray intranasally, then discard. Repeat with new spray every 2 min as needed for opioid overdose symptoms, alternating nostrils.  montelukast (SINGULAIR) 10 mg tablet Take 1 Tab by mouth nightly. Disposition:  Eloped        Follow-up Information    None         Current Discharge Medication List              Diagnosis     Clinical Impression:   1. Chest pain, unspecified type    2. Malingering    3.  Eloped from emergency department

## 2020-05-31 NOTE — ED NOTES
Patient upset saying, \"I don't want to be here im leaving. \" notified PA. Patient left room and removed own IV.

## 2020-06-01 ENCOUNTER — PATIENT OUTREACH (OUTPATIENT)
Dept: CASE MANAGEMENT | Age: 55
End: 2020-06-01

## 2020-06-01 NOTE — PROGRESS NOTES
Date/Time:  6/1/2020 10:35 AM  Attempted to reach patient by telephone. Left HIPPA compliant message requesting a return call. Will attempt to reach patient again.

## 2020-06-02 ENCOUNTER — PATIENT OUTREACH (OUTPATIENT)
Dept: CASE MANAGEMENT | Age: 55
End: 2020-06-02

## 2020-06-05 ENCOUNTER — PATIENT OUTREACH (OUTPATIENT)
Dept: CASE MANAGEMENT | Age: 55
End: 2020-06-05

## 2020-06-05 ENCOUNTER — APPOINTMENT (OUTPATIENT)
Dept: GENERAL RADIOLOGY | Age: 55
End: 2020-06-05
Attending: EMERGENCY MEDICINE
Payer: MEDICAID

## 2020-06-05 ENCOUNTER — HOSPITAL ENCOUNTER (EMERGENCY)
Age: 55
Discharge: HOME | End: 2020-06-05
Attending: EMERGENCY MEDICINE
Payer: MEDICAID

## 2020-06-05 VITALS
TEMPERATURE: 98 F | HEART RATE: 75 BPM | DIASTOLIC BLOOD PRESSURE: 88 MMHG | RESPIRATION RATE: 19 BRPM | OXYGEN SATURATION: 100 % | SYSTOLIC BLOOD PRESSURE: 200 MMHG

## 2020-06-05 DIAGNOSIS — F41.1 ANXIETY STATE: Primary | ICD-10-CM

## 2020-06-05 LAB
ALBUMIN SERPL-MCNC: 4.2 G/DL (ref 3.4–5)
ALBUMIN/GLOB SERPL: 1.2 {RATIO} (ref 0.8–1.7)
ALP SERPL-CCNC: 96 U/L (ref 45–117)
ALT SERPL-CCNC: 26 U/L (ref 13–56)
ANION GAP SERPL CALC-SCNC: 5 MMOL/L (ref 3–18)
AST SERPL-CCNC: 24 U/L (ref 10–38)
ATRIAL RATE: 69 BPM
BASOPHILS # BLD: 0 K/UL (ref 0–0.1)
BASOPHILS NFR BLD: 0 % (ref 0–2)
BILIRUB SERPL-MCNC: 0.5 MG/DL (ref 0.2–1)
BUN SERPL-MCNC: 12 MG/DL (ref 7–18)
BUN/CREAT SERPL: 10 (ref 12–20)
CALCIUM SERPL-MCNC: 9 MG/DL (ref 8.5–10.1)
CALCULATED P AXIS, ECG09: 68 DEGREES
CALCULATED R AXIS, ECG10: 52 DEGREES
CALCULATED T AXIS, ECG11: 119 DEGREES
CHLORIDE SERPL-SCNC: 108 MMOL/L (ref 100–111)
CK MB CFR SERPL CALC: NORMAL % (ref 0–4)
CK MB SERPL-MCNC: <1 NG/ML (ref 5–25)
CK SERPL-CCNC: 73 U/L (ref 26–192)
CO2 SERPL-SCNC: 30 MMOL/L (ref 21–32)
CREAT SERPL-MCNC: 1.2 MG/DL (ref 0.6–1.3)
DIAGNOSIS, 93000: NORMAL
DIFFERENTIAL METHOD BLD: ABNORMAL
EOSINOPHIL # BLD: 0.1 K/UL (ref 0–0.4)
EOSINOPHIL NFR BLD: 1 % (ref 0–5)
ERYTHROCYTE [DISTWIDTH] IN BLOOD BY AUTOMATED COUNT: 13.6 % (ref 11.6–14.5)
GLOBULIN SER CALC-MCNC: 3.4 G/DL (ref 2–4)
GLUCOSE SERPL-MCNC: 105 MG/DL (ref 74–99)
HCT VFR BLD AUTO: 43.8 % (ref 35–45)
HGB BLD-MCNC: 14.5 G/DL (ref 12–16)
LYMPHOCYTES # BLD: 3.7 K/UL (ref 0.9–3.6)
LYMPHOCYTES NFR BLD: 39 % (ref 21–52)
MCH RBC QN AUTO: 29.6 PG (ref 24–34)
MCHC RBC AUTO-ENTMCNC: 33.1 G/DL (ref 31–37)
MCV RBC AUTO: 89.4 FL (ref 74–97)
MONOCYTES # BLD: 0.4 K/UL (ref 0.05–1.2)
MONOCYTES NFR BLD: 4 % (ref 3–10)
NEUTS SEG # BLD: 5.3 K/UL (ref 1.8–8)
NEUTS SEG NFR BLD: 56 % (ref 40–73)
P-R INTERVAL, ECG05: 150 MS
PLATELET # BLD AUTO: 259 K/UL (ref 135–420)
PMV BLD AUTO: 10.9 FL (ref 9.2–11.8)
POTASSIUM SERPL-SCNC: 4.2 MMOL/L (ref 3.5–5.5)
PROT SERPL-MCNC: 7.6 G/DL (ref 6.4–8.2)
Q-T INTERVAL, ECG07: 438 MS
QRS DURATION, ECG06: 130 MS
QTC CALCULATION (BEZET), ECG08: 469 MS
RBC # BLD AUTO: 4.9 M/UL (ref 4.2–5.3)
SODIUM SERPL-SCNC: 143 MMOL/L (ref 136–145)
TROPONIN I SERPL-MCNC: <0.02 NG/ML (ref 0–0.04)
VENTRICULAR RATE, ECG03: 69 BPM
WBC # BLD AUTO: 9.5 K/UL (ref 4.6–13.2)

## 2020-06-05 PROCEDURE — 71045 X-RAY EXAM CHEST 1 VIEW: CPT

## 2020-06-05 PROCEDURE — 85025 COMPLETE CBC W/AUTO DIFF WBC: CPT

## 2020-06-05 PROCEDURE — 93005 ELECTROCARDIOGRAM TRACING: CPT

## 2020-06-05 PROCEDURE — 99284 EMERGENCY DEPT VISIT MOD MDM: CPT

## 2020-06-05 PROCEDURE — 74011250636 HC RX REV CODE- 250/636: Performed by: EMERGENCY MEDICINE

## 2020-06-05 PROCEDURE — 82550 ASSAY OF CK (CPK): CPT

## 2020-06-05 PROCEDURE — 94640 AIRWAY INHALATION TREATMENT: CPT

## 2020-06-05 PROCEDURE — 74011000250 HC RX REV CODE- 250: Performed by: EMERGENCY MEDICINE

## 2020-06-05 PROCEDURE — 96374 THER/PROPH/DIAG INJ IV PUSH: CPT

## 2020-06-05 PROCEDURE — 80053 COMPREHEN METABOLIC PANEL: CPT

## 2020-06-05 RX ORDER — MORPHINE SULFATE 2 MG/ML
2 INJECTION, SOLUTION INTRAMUSCULAR; INTRAVENOUS
Status: COMPLETED | OUTPATIENT
Start: 2020-06-05 | End: 2020-06-05

## 2020-06-05 RX ORDER — IPRATROPIUM BROMIDE AND ALBUTEROL SULFATE 2.5; .5 MG/3ML; MG/3ML
3 SOLUTION RESPIRATORY (INHALATION)
Status: COMPLETED | OUTPATIENT
Start: 2020-06-05 | End: 2020-06-05

## 2020-06-05 RX ORDER — ALPRAZOLAM 0.25 MG/1
0.5 TABLET ORAL
Status: DISCONTINUED | OUTPATIENT
Start: 2020-06-05 | End: 2020-06-05 | Stop reason: HOSPADM

## 2020-06-05 RX ADMIN — MORPHINE SULFATE 2 MG: 2 INJECTION, SOLUTION INTRAMUSCULAR; INTRAVENOUS at 04:20

## 2020-06-05 RX ADMIN — IPRATROPIUM BROMIDE AND ALBUTEROL SULFATE 3 ML: .5; 3 SOLUTION RESPIRATORY (INHALATION) at 04:20

## 2020-06-05 NOTE — ED TRIAGE NOTES
Patient brought by medic for anxiety. Pt reports the death of her mother last week and brother today. Pt under a lot of stress.

## 2020-06-05 NOTE — DISCHARGE INSTRUCTIONS
Patient Education        Anxiety Disorder: Care Instructions  Your Care Instructions     Anxiety is a normal reaction to stress. Difficult situations can cause you to have symptoms such as sweaty palms and a nervous feeling. In an anxiety disorder, the symptoms are far more severe. Constant worry, muscle tension, trouble sleeping, nausea and diarrhea, and other symptoms can make normal daily activities difficult or impossible. These symptoms may occur for no reason, and they can affect your work, school, or social life. Medicines, counseling, and self-care can all help. Follow-up care is a key part of your treatment and safety. Be sure to make and go to all appointments, and call your doctor if you are having problems. It's also a good idea to know your test results and keep a list of the medicines you take. How can you care for yourself at home? · Take medicines exactly as directed. Call your doctor if you think you are having a problem with your medicine. · Go to your counseling sessions and follow-up appointments. · Recognize and accept your anxiety. Then, when you are in a situation that makes you anxious, say to yourself, \"This is not an emergency. I feel uncomfortable, but I am not in danger. I can keep going even if I feel anxious. \"  · Be kind to your body:  ? Relieve tension with exercise or a massage. ? Get enough rest.  ? Avoid alcohol, caffeine, nicotine, and illegal drugs. They can increase your anxiety level and cause sleep problems. ? Learn and do relaxation techniques. See below for more about these techniques. · Engage your mind. Get out and do something you enjoy. Go to a funny movie, or take a walk or hike. Plan your day. Having too much or too little to do can make you anxious. · Keep a record of your symptoms. Discuss your fears with a good friend or family member, or join a support group for people with similar problems. Talking to others sometimes relieves stress.   · Get involved in social groups, or volunteer to help others. Being alone sometimes makes things seem worse than they are. · Get at least 30 minutes of exercise on most days of the week to relieve stress. Walking is a good choice. You also may want to do other activities, such as running, swimming, cycling, or playing tennis or team sports. Relaxation techniques  Do relaxation exercises 10 to 20 minutes a day. You can play soothing, relaxing music while you do them, if you wish. · Tell others in your house that you are going to do your relaxation exercises. Ask them not to disturb you. · Find a comfortable place, away from all distractions and noise. · Lie down on your back, or sit with your back straight. · Focus on your breathing. Make it slow and steady. · Breathe in through your nose. Breathe out through either your nose or mouth. · Breathe deeply, filling up the area between your navel and your rib cage. Breathe so that your belly goes up and down. · Do not hold your breath. · Breathe like this for 5 to 10 minutes. Notice the feeling of calmness throughout your whole body. As you continue to breathe slowly and deeply, relax by doing the following for another 5 to 10 minutes:  · Tighten and relax each muscle group in your body. You can begin at your toes and work your way up to your head. · Imagine your muscle groups relaxing and becoming heavy. · Empty your mind of all thoughts. · Let yourself relax more and more deeply. · Become aware of the state of calmness that surrounds you. · When your relaxation time is over, you can bring yourself back to alertness by moving your fingers and toes and then your hands and feet and then stretching and moving your entire body. Sometimes people fall asleep during relaxation, but they usually wake up shortly afterward. · Always give yourself time to return to full alertness before you drive a car or do anything that might cause an accident if you are not fully alert.  Never play a relaxation tape while you drive a car. When should you call for help? FBFD290 anytime you think you may need emergency care. For example, call if:  · You feel you cannot stop from hurting yourself or someone else. Keep the numbers for these national suicide hotlines: 2-004-461-TALK (9-692.938.2434) and 5-911-DZUAMWD (0-788.659.5302). If you or someone you know talks about suicide or feeling hopeless, get help right away. Watch closely for changes in your health, and be sure to contact your doctor if:  · You have anxiety or fear that affects your life. · You have symptoms of anxiety that are new or different from those you had before. Where can you learn more? Go to http://magda-kadie.info/  Enter P754 in the search box to learn more about \"Anxiety Disorder: Care Instructions. \"  Current as of: January 31, 2020               Content Version: 12.5  © 2006-2020 [employer]. Care instructions adapted under license by [employer] (which disclaims liability or warranty for this information). If you have questions about a medical condition or this instruction, always ask your healthcare professional. [address] any warranty or liability for your use of this information.

## 2020-06-05 NOTE — ED PROVIDER NOTES
EMERGENCY DEPARTMENT HISTORY AND PHYSICAL EXAM    3:35 AM      Date: 6/5/2020  Patient Name: Tay Lopez    History of Presenting Illness     Chief Complaint   Patient presents with    Anxiety         History Provided By: Patient    Additional History (Context): Tay Lopez is a 53 y.o. female with hypertension, hyperlipidemia, COPD and asthma who presents with pain. Patient states she has chronic chest pain. Today's chest pain is associated with bilateral arm weakness. This started around 1:30 AM 2 hours ago. She has had multiple family loss in the last 9 months including her brother today. Patient has a history of anxiety she does complain of shortness of breath. Her chest pain is midsternal, nonradiating, constant, nothing makes better or worse. Patient denies cough, fever, nausea, vomiting or diarrhea. Patient smokes half a pack per day. Denies any alcohol or recreational drug use. Monica Ravi PCP: Emmalene Leyden, NP        Current Outpatient Medications   Medication Sig Dispense Refill    albuterol (PROVENTIL HFA, VENTOLIN HFA, PROAIR HFA) 90 mcg/actuation inhaler Take 2 Puffs by inhalation every four (4) hours as needed for Wheezing. 1 Inhaler 0    cloNIDine HCL (CATAPRES) 0.2 mg tablet Take 1 Tab by mouth two (2) times a day. 60 Tab 1    aspirin delayed-release 81 mg tablet Take 1 Tab by mouth daily. 30 Tab 0    ondansetron (ZOFRAN ODT) 4 mg disintegrating tablet Take 1 Tab by mouth every eight (8) hours as needed for Nausea. Indications: prevent nausea and vomiting after surgery 20 Tab 0    famotidine (PEPCID) 20 mg tablet Take 1 Tab by mouth daily. 30 Tab 1    OXYGEN-AIR DELIVERY SYSTEMS 3 L by IntraNASal route as needed for Other (sob).  docusate sodium (COLACE) 100 mg capsule Take 1 Cap by mouth two (2) times a day. 120 Cap 0    fluticasone furoate-vilanterol (BREO ELLIPTA) 200-25 mcg/dose inhaler Take 1 Puff by inhalation daily.       amLODIPine (NORVASC) 10 mg tablet Take 1 Tab by mouth daily. 30 Tab 0    arformoterol (BROVANA) 15 mcg/2 mL nebu neb solution 2 mL by Nebulization route two (2) times a day. 60 Vial 0    budesonide (PULMICORT) 0.5 mg/2 mL nbsp 2 mL by Nebulization route two (2) times a day. 60 Each 0    tiotropium (SPIRIVA) 18 mcg inhalation capsule Take 1 Cap by inhalation daily. 30 Cap 0    atorvastatin (LIPITOR) 20 mg tablet Take 1 Tab by mouth nightly. 30 Tab 0    escitalopram oxalate (LEXAPRO) 10 mg tablet Take 1 Tab by mouth every evening. 30 Tab 0    metoprolol tartrate (LOPRESSOR) 25 mg tablet Take 0.5 Tabs by mouth every twelve (12) hours. 30 Tab 0    roflumilast (DALIRESP) 500 mcg tab tablet Take 1 Tab by mouth daily. 30 Tab 0    OTHER Incentive spirometry- use as directed 1 Each 0    naloxone (NARCAN) 4 mg/actuation nasal spray Use 1 spray intranasally, then discard. Repeat with new spray every 2 min as needed for opioid overdose symptoms, alternating nostrils. 1 Each 0    montelukast (SINGULAIR) 10 mg tablet Take 1 Tab by mouth nightly. 30 Tab 1       Past History     Past Medical History:  Past Medical History:   Diagnosis Date    Asthma     CHF (congestive heart failure) (HCC)     Chronic obstructive pulmonary disease (HCC)     Dependence on supplemental oxygen     Endocrine disease     thyroid issues    Gastrointestinal disorder     \"blockage in my stomach\"    Hypercholesterolemia     Hypertension        Past Surgical History:  Past Surgical History:   Procedure Laterality Date    HX GI      Exploratory laparotomy with lysis of adhesions and primary repair of incarcerated umbilical hernia       Family History:  History reviewed. No pertinent family history.     Social History:  Social History     Tobacco Use    Smoking status: Current Every Day Smoker     Packs/day: 0.25    Smokeless tobacco: Former User   Substance Use Topics    Alcohol use: No     Comment: socially    Drug use: Not Currently     Types: Heroin       Allergies:  No Known Allergies      Review of Systems       Review of Systems   Constitutional: Negative. Negative for chills, diaphoresis and fever. HENT: Negative. Negative for congestion, rhinorrhea and sore throat. Eyes: Negative. Negative for pain, discharge and redness. Respiratory: Negative. Negative for cough, chest tightness, shortness of breath and wheezing. Cardiovascular: Positive for chest pain. Gastrointestinal: Negative. Negative for abdominal pain, constipation, diarrhea, nausea and vomiting. Genitourinary: Negative. Negative for dysuria, flank pain, frequency, hematuria and urgency. Musculoskeletal: Negative. Negative for back pain and neck pain. Skin: Negative. Negative for rash. Neurological: Positive for numbness. Negative for syncope, weakness and headaches. Psychiatric/Behavioral: Negative. All other systems reviewed and are negative. Physical Exam     Visit Vitals  /88   Pulse 75   Temp 98 °F (36.7 °C)   Resp 19   LMP 04/14/2009   SpO2 100%         Physical Exam  Vitals signs and nursing note reviewed. Constitutional:       General: She is not in acute distress. Appearance: Normal appearance. She is well-developed. She is not ill-appearing, toxic-appearing or diaphoretic. HENT:      Head: Normocephalic and atraumatic. Mouth/Throat:      Pharynx: No oropharyngeal exudate. Eyes:      General: No scleral icterus. Conjunctiva/sclera: Conjunctivae normal.      Pupils: Pupils are equal, round, and reactive to light. Neck:      Musculoskeletal: Normal range of motion and neck supple. Thyroid: No thyromegaly. Vascular: No hepatojugular reflux or JVD. Trachea: No tracheal deviation. Cardiovascular:      Rate and Rhythm: Normal rate and regular rhythm. Pulses: Normal pulses. Radial pulses are 2+ on the right side and 2+ on the left side. Dorsalis pedis pulses are 2+ on the right side and 2+ on the left side.       Heart sounds: Normal heart sounds, S1 normal and S2 normal. No murmur. No gallop. No S3 or S4 sounds. Pulmonary:      Effort: Pulmonary effort is normal. No respiratory distress. Breath sounds: Normal breath sounds. No decreased breath sounds, wheezing, rhonchi or rales. Chest:       Abdominal:      General: Bowel sounds are normal. There is no distension. Palpations: Abdomen is soft. Abdomen is not rigid. There is no mass. Tenderness: There is no abdominal tenderness. There is no guarding or rebound. Negative signs include Vasquez's sign and McBurney's sign. Musculoskeletal: Normal range of motion. Comments: Strength 5/5 throughout    Lymphadenopathy:      Head:      Right side of head: No submental, submandibular, preauricular or occipital adenopathy. Left side of head: No submental, submandibular, preauricular or occipital adenopathy. Cervical: No cervical adenopathy. Upper Body:      Right upper body: No supraclavicular adenopathy. Left upper body: No supraclavicular adenopathy. Skin:     General: Skin is warm and dry. Findings: No rash. Neurological:      Mental Status: She is alert. She is not disoriented. GCS: GCS eye subscore is 4. GCS verbal subscore is 5. GCS motor subscore is 6. Cranial Nerves: No cranial nerve deficit. Sensory: No sensory deficit. Coordination: Coordination normal.      Gait: Gait normal.      Deep Tendon Reflexes: Reflexes are normal and symmetric. Comments: Grossly intact    Psychiatric:         Speech: Speech normal.         Behavior: Behavior normal.         Thought Content:  Thought content normal.         Judgment: Judgment normal.           Diagnostic Study Results     Labs -  Recent Results (from the past 12 hour(s))   CBC WITH AUTOMATED DIFF    Collection Time: 06/05/20  3:11 AM   Result Value Ref Range    WBC 9.5 4.6 - 13.2 K/uL    RBC 4.90 4.20 - 5.30 M/uL    HGB 14.5 12.0 - 16.0 g/dL    HCT 43.8 35.0 - 45.0 %    MCV 89.4 74.0 - 97.0 FL    MCH 29.6 24.0 - 34.0 PG    MCHC 33.1 31.0 - 37.0 g/dL    RDW 13.6 11.6 - 14.5 %    PLATELET 775 505 - 884 K/uL    MPV 10.9 9.2 - 11.8 FL    NEUTROPHILS 56 40 - 73 %    LYMPHOCYTES 39 21 - 52 %    MONOCYTES 4 3 - 10 %    EOSINOPHILS 1 0 - 5 %    BASOPHILS 0 0 - 2 %    ABS. NEUTROPHILS 5.3 1.8 - 8.0 K/UL    ABS. LYMPHOCYTES 3.7 (H) 0.9 - 3.6 K/UL    ABS. MONOCYTES 0.4 0.05 - 1.2 K/UL    ABS. EOSINOPHILS 0.1 0.0 - 0.4 K/UL    ABS. BASOPHILS 0.0 0.0 - 0.1 K/UL    DF AUTOMATED     METABOLIC PANEL, COMPREHENSIVE    Collection Time: 06/05/20  3:11 AM   Result Value Ref Range    Sodium 143 136 - 145 mmol/L    Potassium 4.2 3.5 - 5.5 mmol/L    Chloride 108 100 - 111 mmol/L    CO2 30 21 - 32 mmol/L    Anion gap 5 3.0 - 18 mmol/L    Glucose 105 (H) 74 - 99 mg/dL    BUN 12 7.0 - 18 MG/DL    Creatinine 1.20 0.6 - 1.3 MG/DL    BUN/Creatinine ratio 10 (L) 12 - 20      GFR est AA 57 (L) >60 ml/min/1.73m2    GFR est non-AA 47 (L) >60 ml/min/1.73m2    Calcium 9.0 8.5 - 10.1 MG/DL    Bilirubin, total 0.5 0.2 - 1.0 MG/DL    ALT (SGPT) 26 13 - 56 U/L    AST (SGOT) 24 10 - 38 U/L    Alk.  phosphatase 96 45 - 117 U/L    Protein, total 7.6 6.4 - 8.2 g/dL    Albumin 4.2 3.4 - 5.0 g/dL    Globulin 3.4 2.0 - 4.0 g/dL    A-G Ratio 1.2 0.8 - 1.7     CARDIAC PANEL,(CK, CKMB & TROPONIN)    Collection Time: 06/05/20  3:11 AM   Result Value Ref Range    CK - MB <1.0 <3.6 ng/ml    CK-MB Index  0.0 - 4.0 %     CALCULATION NOT PERFORMED WHEN RESULT IS BELOW LINEAR LIMIT    CK 73 26 - 192 U/L    Troponin-I, QT <0.02 0.0 - 0.045 NG/ML       Radiologic Studies -   XR CHEST PORT    (Results Pending)         Medical Decision Making   Provider Notes (Medical Decision Making):  MDM  Number of Diagnoses or Management Options  Diagnosis management comments: DIFFERENTIAL DIAGNOSES/ MEDICAL DECISION MAKING:  Chest pain etiologies include acute cardiac events to include possible acute myocardial infarction, acute coronary syndrome, pneumonia, chest wall pain (myofascial/ musculoskeletal etiology), chronic obstructive pulmonary disease (copd), acute asthma exacerbation, congestive heart failure, acute bronchitis, pulmonary embolism, upper respiratory infection, referred abdominal pain, other etiologies, versus combination of the above. I am the first provider for this patient. I reviewed the vital signs, available nursing notes, past medical history, past surgical history, family history and social history. Vital Signs-Reviewed the patient's vital signs. Records Reviewed: Nursing Notes (Time of Review: 3:35 AM)    ED Course: Progress Notes, Reevaluation, and Consults:    Labs essentially normal.   Chest X-Ray showed No acute process. EKG showed NSR with rate of 76 bpm. With no ST elevations or depression and non specific T wave changes. 4:33 AM 6/5/2020        Diagnosis       I have reassessed the patient. Patient is feeling well. Patient was discharged in stable condition. Patient is to return to emergency department if any new or worsening condition. Clinical Impression:   1. Anxiety state        Disposition: Discharged home    Follow-up Information     Follow up With Specialties Details Why Contact Info    Pema Davalos NP Nurse Practitioner In 2 days  [facility] [address]  [phone]               Attestation        Provider Attestation:     I personally performed the services described in the documentation, reviewed the documentation and it accurately and completely records my words and actions utilizing the [address] June 05, 2020 at 4:26 AM - Humza Peres DO    Disclaimer. It is dictated using utilizing voice recognition software. Unfortunately this leads to occasional typographical errors. I apologize in advance if the situation occurs. If questions arise please do not hesitate to contact me or call our department.

## 2020-06-10 ENCOUNTER — PATIENT OUTREACH (OUTPATIENT)
Dept: CASE MANAGEMENT | Age: 55
End: 2020-06-10

## 2020-06-10 NOTE — PROGRESS NOTES
Contacted patient for Transitions of Care Coordination  follow up. No answer. Left message introducing self, role and reason for call. Requested return call. Contact information provided. 2nd attempt. Episode resolved.

## 2020-06-14 ENCOUNTER — HOSPITAL ENCOUNTER (EMERGENCY)
Age: 55
Discharge: HOME | End: 2020-06-14
Attending: EMERGENCY MEDICINE
Payer: MEDICAID

## 2020-06-14 ENCOUNTER — APPOINTMENT (OUTPATIENT)
Dept: GENERAL RADIOLOGY | Age: 55
End: 2020-06-14
Attending: STUDENT IN AN ORGANIZED HEALTH CARE EDUCATION/TRAINING PROGRAM
Payer: MEDICAID

## 2020-06-14 ENCOUNTER — APPOINTMENT (OUTPATIENT)
Dept: CT IMAGING | Age: 55
End: 2020-06-14
Attending: STUDENT IN AN ORGANIZED HEALTH CARE EDUCATION/TRAINING PROGRAM
Payer: MEDICAID

## 2020-06-14 DIAGNOSIS — R07.9 ACUTE CHEST PAIN: Primary | ICD-10-CM

## 2020-06-14 DIAGNOSIS — R10.84 ABDOMINAL PAIN, GENERALIZED: ICD-10-CM

## 2020-06-14 LAB
ALBUMIN SERPL-MCNC: 3.6 G/DL (ref 3.4–5)
ALBUMIN/GLOB SERPL: 1.3 {RATIO} (ref 0.8–1.7)
ALP SERPL-CCNC: 81 U/L (ref 45–117)
ALT SERPL-CCNC: 18 U/L (ref 13–56)
ANION GAP SERPL CALC-SCNC: 4 MMOL/L (ref 3–18)
AST SERPL-CCNC: 10 U/L (ref 10–38)
BASOPHILS # BLD: 0 K/UL (ref 0–0.1)
BASOPHILS NFR BLD: 0 % (ref 0–2)
BILIRUB DIRECT SERPL-MCNC: 0.1 MG/DL (ref 0–0.2)
BILIRUB SERPL-MCNC: 0.3 MG/DL (ref 0.2–1)
BNP SERPL-MCNC: 744 PG/ML (ref 0–900)
BUN SERPL-MCNC: 12 MG/DL (ref 7–18)
BUN/CREAT SERPL: 14 (ref 12–20)
CALCIUM SERPL-MCNC: 8.5 MG/DL (ref 8.5–10.1)
CHLORIDE SERPL-SCNC: 111 MMOL/L (ref 100–111)
CK MB CFR SERPL CALC: 2.4 % (ref 0–4)
CK MB CFR SERPL CALC: 2.8 % (ref 0–4)
CK MB SERPL-MCNC: 1.4 NG/ML (ref 5–25)
CK MB SERPL-MCNC: 1.6 NG/ML (ref 5–25)
CK SERPL-CCNC: 57 U/L (ref 26–192)
CK SERPL-CCNC: 59 U/L (ref 26–192)
CO2 SERPL-SCNC: 29 MMOL/L (ref 21–32)
CREAT SERPL-MCNC: 0.86 MG/DL (ref 0.6–1.3)
DIFFERENTIAL METHOD BLD: ABNORMAL
EOSINOPHIL # BLD: 0.3 K/UL (ref 0–0.4)
EOSINOPHIL NFR BLD: 4 % (ref 0–5)
ERYTHROCYTE [DISTWIDTH] IN BLOOD BY AUTOMATED COUNT: 13 % (ref 11.6–14.5)
GLOBULIN SER CALC-MCNC: 2.7 G/DL (ref 2–4)
GLUCOSE SERPL-MCNC: 88 MG/DL (ref 74–99)
HCT VFR BLD AUTO: 36.8 % (ref 35–45)
HGB BLD-MCNC: 12 G/DL (ref 12–16)
LIPASE SERPL-CCNC: 52 U/L (ref 73–393)
LYMPHOCYTES # BLD: 1.9 K/UL (ref 0.9–3.6)
LYMPHOCYTES NFR BLD: 29 % (ref 21–52)
MCH RBC QN AUTO: 29.5 PG (ref 24–34)
MCHC RBC AUTO-ENTMCNC: 32.6 G/DL (ref 31–37)
MCV RBC AUTO: 90.4 FL (ref 74–97)
MONOCYTES # BLD: 0.4 K/UL (ref 0.05–1.2)
MONOCYTES NFR BLD: 6 % (ref 3–10)
NEUTS SEG # BLD: 3.9 K/UL (ref 1.8–8)
NEUTS SEG NFR BLD: 61 % (ref 40–73)
PLATELET # BLD AUTO: 202 K/UL (ref 135–420)
PMV BLD AUTO: 11.1 FL (ref 9.2–11.8)
POTASSIUM SERPL-SCNC: 3.8 MMOL/L (ref 3.5–5.5)
PROT SERPL-MCNC: 6.3 G/DL (ref 6.4–8.2)
RBC # BLD AUTO: 4.07 M/UL (ref 4.2–5.3)
SODIUM SERPL-SCNC: 144 MMOL/L (ref 136–145)
TROPONIN I SERPL-MCNC: <0.02 NG/ML (ref 0–0.04)
TROPONIN I SERPL-MCNC: <0.02 NG/ML (ref 0–0.04)
WBC # BLD AUTO: 6.4 K/UL (ref 4.6–13.2)

## 2020-06-14 PROCEDURE — 96375 TX/PRO/DX INJ NEW DRUG ADDON: CPT

## 2020-06-14 PROCEDURE — 94640 AIRWAY INHALATION TREATMENT: CPT

## 2020-06-14 PROCEDURE — 74011636320 HC RX REV CODE- 636/320: Performed by: EMERGENCY MEDICINE

## 2020-06-14 PROCEDURE — 82550 ASSAY OF CK (CPK): CPT

## 2020-06-14 PROCEDURE — 83690 ASSAY OF LIPASE: CPT

## 2020-06-14 PROCEDURE — 74011000250 HC RX REV CODE- 250: Performed by: STUDENT IN AN ORGANIZED HEALTH CARE EDUCATION/TRAINING PROGRAM

## 2020-06-14 PROCEDURE — 85025 COMPLETE CBC W/AUTO DIFF WBC: CPT

## 2020-06-14 PROCEDURE — 93005 ELECTROCARDIOGRAM TRACING: CPT

## 2020-06-14 PROCEDURE — 74011250636 HC RX REV CODE- 250/636: Performed by: EMERGENCY MEDICINE

## 2020-06-14 PROCEDURE — 83880 ASSAY OF NATRIURETIC PEPTIDE: CPT

## 2020-06-14 PROCEDURE — 99285 EMERGENCY DEPT VISIT HI MDM: CPT

## 2020-06-14 PROCEDURE — 74011250636 HC RX REV CODE- 250/636: Performed by: STUDENT IN AN ORGANIZED HEALTH CARE EDUCATION/TRAINING PROGRAM

## 2020-06-14 PROCEDURE — 96365 THER/PROPH/DIAG IV INF INIT: CPT

## 2020-06-14 PROCEDURE — 74177 CT ABD & PELVIS W/CONTRAST: CPT

## 2020-06-14 PROCEDURE — 74018 RADEX ABDOMEN 1 VIEW: CPT

## 2020-06-14 PROCEDURE — 80076 HEPATIC FUNCTION PANEL: CPT

## 2020-06-14 PROCEDURE — 80048 BASIC METABOLIC PNL TOTAL CA: CPT

## 2020-06-14 PROCEDURE — 71045 X-RAY EXAM CHEST 1 VIEW: CPT

## 2020-06-14 RX ORDER — MAGNESIUM SULFATE HEPTAHYDRATE 40 MG/ML
2 INJECTION, SOLUTION INTRAVENOUS ONCE
Status: COMPLETED | OUTPATIENT
Start: 2020-06-14 | End: 2020-06-14

## 2020-06-14 RX ORDER — IPRATROPIUM BROMIDE AND ALBUTEROL SULFATE 2.5; .5 MG/3ML; MG/3ML
3 SOLUTION RESPIRATORY (INHALATION)
Status: COMPLETED | OUTPATIENT
Start: 2020-06-14 | End: 2020-06-14

## 2020-06-14 RX ORDER — ALBUTEROL SULFATE 90 UG/1
2 AEROSOL, METERED RESPIRATORY (INHALATION)
Qty: 1 INHALER | Refills: 0 | OUTPATIENT
Start: 2020-06-14 | End: 2020-07-06

## 2020-06-14 RX ADMIN — METHYLPREDNISOLONE SODIUM SUCCINATE 80 MG: 125 INJECTION, POWDER, FOR SOLUTION INTRAMUSCULAR; INTRAVENOUS at 18:54

## 2020-06-14 RX ADMIN — IPRATROPIUM BROMIDE AND ALBUTEROL SULFATE 3 ML: .5; 3 SOLUTION RESPIRATORY (INHALATION) at 18:54

## 2020-06-14 RX ADMIN — IOPAMIDOL 100 ML: 612 INJECTION, SOLUTION INTRAVENOUS at 20:02

## 2020-06-14 RX ADMIN — MAGNESIUM SULFATE IN WATER 2 G: 40 INJECTION, SOLUTION INTRAVENOUS at 18:54

## 2020-06-14 NOTE — ED PROVIDER NOTES
EMERGENCY DEPARTMENT HISTORY AND PHYSICAL EXAM    6:15 PM      Date: (Not on file)  Patient Name: Lisseth Russ    History of Presenting Illness     No chief complaint on file. History Provided By: Patient  Location/Duration/Severity/Modifying factors   HPI  47yof c/o chest pressure and pain that started approximately 1 hour ago. Pt concerned because pain is consistent with 2 prior heart attacks. Per EMS, pain resolved after nitro x 2 and 324mg aspirin. Pt also c/o constipation x 1 week, which is unusual for her. Pt denies SOB, no dizziness, no lightheadedness. PCP: Daniel Siddiqi NP    Current Outpatient Medications   Medication Sig Dispense Refill    albuterol (PROVENTIL HFA, VENTOLIN HFA, PROAIR HFA) 90 mcg/actuation inhaler Take 2 Puffs by inhalation every four (4) hours as needed for Wheezing. 1 Inhaler 0    cloNIDine HCL (CATAPRES) 0.2 mg tablet Take 1 Tab by mouth two (2) times a day. 60 Tab 1    aspirin delayed-release 81 mg tablet Take 1 Tab by mouth daily. 30 Tab 0    ondansetron (ZOFRAN ODT) 4 mg disintegrating tablet Take 1 Tab by mouth every eight (8) hours as needed for Nausea. Indications: prevent nausea and vomiting after surgery 20 Tab 0    famotidine (PEPCID) 20 mg tablet Take 1 Tab by mouth daily. 30 Tab 1    OXYGEN-AIR DELIVERY SYSTEMS 3 L by IntraNASal route as needed for Other (sob).  docusate sodium (COLACE) 100 mg capsule Take 1 Cap by mouth two (2) times a day. 120 Cap 0    fluticasone furoate-vilanterol (BREO ELLIPTA) 200-25 mcg/dose inhaler Take 1 Puff by inhalation daily.  amLODIPine (NORVASC) 10 mg tablet Take 1 Tab by mouth daily. 30 Tab 0    arformoterol (BROVANA) 15 mcg/2 mL nebu neb solution 2 mL by Nebulization route two (2) times a day. 60 Vial 0    budesonide (PULMICORT) 0.5 mg/2 mL nbsp 2 mL by Nebulization route two (2) times a day. 60 Each 0    tiotropium (SPIRIVA) 18 mcg inhalation capsule Take 1 Cap by inhalation daily.  30 Cap 0    atorvastatin (LIPITOR) 20 mg tablet Take 1 Tab by mouth nightly. 30 Tab 0    escitalopram oxalate (LEXAPRO) 10 mg tablet Take 1 Tab by mouth every evening. 30 Tab 0    metoprolol tartrate (LOPRESSOR) 25 mg tablet Take 0.5 Tabs by mouth every twelve (12) hours. 30 Tab 0    roflumilast (DALIRESP) 500 mcg tab tablet Take 1 Tab by mouth daily. 30 Tab 0    OTHER Incentive spirometry- use as directed 1 Each 0    naloxone (NARCAN) 4 mg/actuation nasal spray Use 1 spray intranasally, then discard. Repeat with new spray every 2 min as needed for opioid overdose symptoms, alternating nostrils. 1 Each 0    montelukast (SINGULAIR) 10 mg tablet Take 1 Tab by mouth nightly. 30 Tab 1       Past History     Past Medical History:  Past Medical History:   Diagnosis Date    Asthma     CHF (congestive heart failure) (HCC)     Chronic obstructive pulmonary disease (HCC)     Dependence on supplemental oxygen     Endocrine disease     thyroid issues    Gastrointestinal disorder     \"blockage in my stomach\"    Hypercholesterolemia     Hypertension        Past Surgical History:  Past Surgical History:   Procedure Laterality Date    HX GI      Exploratory laparotomy with lysis of adhesions and primary repair of incarcerated umbilical hernia       Family History:  No family history on file. Social History:  Social History     Tobacco Use    Smoking status: Current Every Day Smoker     Packs/day: 0.25    Smokeless tobacco: Former User   Substance Use Topics    Alcohol use: No     Comment: socially    Drug use: Not Currently     Types: Heroin       Allergies:  No Known Allergies      Review of Systems       Review of Systems   Constitutional: Negative for activity change, appetite change, chills, fatigue and fever. HENT: Negative for congestion, drooling, ear discharge, facial swelling and sinus pain. Respiratory: Positive for chest tightness. Negative for apnea, cough, choking and shortness of breath. Cardiovascular: Positive for chest pain. Negative for palpitations and leg swelling. Genitourinary: Negative for difficulty urinating, dysuria, flank pain and frequency. Musculoskeletal: Negative for arthralgias, back pain and joint swelling. Skin: Negative for color change, pallor and rash. Neurological: Negative for dizziness, facial asymmetry, light-headedness and headaches. Psychiatric/Behavioral: Negative for agitation, behavioral problems and confusion. Physical Exam     Visit Vitals  LMP 04/14/2009         Physical Exam  Constitutional:       Appearance: She is normal weight. HENT:      Head: Normocephalic and atraumatic. Mouth/Throat:      Mouth: Mucous membranes are moist.      Pharynx: Oropharynx is clear. Eyes:      Conjunctiva/sclera: Conjunctivae normal.      Pupils: Pupils are equal, round, and reactive to light. Neck:      Musculoskeletal: Normal range of motion and neck supple. Cardiovascular:      Rate and Rhythm: Normal rate and regular rhythm. Pulses: Normal pulses. Heart sounds: Normal heart sounds. Pulmonary:      Effort: Pulmonary effort is normal.      Breath sounds: Wheezing and rhonchi present. No rales. Chest:      Chest wall: No tenderness. Abdominal:      Palpations: There is no mass. Tenderness: There is abdominal tenderness. There is no right CVA tenderness or guarding. Musculoskeletal: Normal range of motion. Skin:     General: Skin is warm and dry. Neurological:      General: No focal deficit present. Mental Status: She is alert. Mental status is at baseline. Psychiatric:         Mood and Affect: Mood normal.         Thought Content: Thought content normal.           Diagnostic Study Results     Labs -  No results found for this or any previous visit (from the past 12 hour(s)). Radiologic Studies -   XR CHEST PA LAT    (Results Pending)         Medical Decision Making   I am the first provider for this patient.     I reviewed the vital signs, available nursing notes, past medical history, past surgical history, family history and social history. Vital Signs-Reviewed the patient's vital signs. EKG: sinus tach with LBBB, unchanged from previous. Records Reviewed: Nursing Notes and Old Medical Records (Time of Review: 6:15 PM)    ED Course: Progress Notes, Reevaluation, and Consults:    ED Course as of Nima 15 2232   Sun Jun 14, 2020   1933 Pt states improvement in pain following duoneb    [JL]      ED Course User Index  [JL] Max Brown MD       Provider Notes (Medical Decision Making):   MDM  [id number] c/o abdominal pain, chest pain, and SOB starting approximately 1 hour before arrival.  Pt with concerning hx of COPD requiring 4L oxygen at home, 2 prior heart attacks. On initial exam, diffuse wheezing treated with duoneb and steroids. Given constellation of symptoms, concern for possible cardiac etiology, COPD exacerbation, and/or abdominal component. CT no acute pathology  Patient feels better after steroids and duo nebs  Chest pain has resolved  Patient signed out to Dr. Suni Carpenter pending repeat troponin        Diagnosis     Clinical Impression: No diagnosis found. Disposition: Pending CT    Follow-up Information    None          Patient's Medications   Start Taking    No medications on file   Continue Taking    ALBUTEROL (PROVENTIL HFA, VENTOLIN HFA, PROAIR HFA) 90 MCG/ACTUATION INHALER    Take 2 Puffs by inhalation every four (4) hours as needed for Wheezing. AMLODIPINE (NORVASC) 10 MG TABLET    Take 1 Tab by mouth daily. ARFORMOTEROL (BROVANA) 15 MCG/2 ML NEBU NEB SOLUTION    2 mL by Nebulization route two (2) times a day. ASPIRIN DELAYED-RELEASE 81 MG TABLET    Take 1 Tab by mouth daily. ATORVASTATIN (LIPITOR) 20 MG TABLET    Take 1 Tab by mouth nightly. BUDESONIDE (PULMICORT) 0.5 MG/2 ML NBSP    2 mL by Nebulization route two (2) times a day.     CLONIDINE HCL (CATAPRES) 0.2 MG TABLET    Take 1 Tab by mouth two (2) times a day. DOCUSATE SODIUM (COLACE) 100 MG CAPSULE    Take 1 Cap by mouth two (2) times a day. ESCITALOPRAM OXALATE (LEXAPRO) 10 MG TABLET    Take 1 Tab by mouth every evening. FAMOTIDINE (PEPCID) 20 MG TABLET    Take 1 Tab by mouth daily. FLUTICASONE FUROATE-VILANTEROL (BREO ELLIPTA) 200-25 MCG/DOSE INHALER    Take 1 Puff by inhalation daily. METOPROLOL TARTRATE (LOPRESSOR) 25 MG TABLET    Take 0.5 Tabs by mouth every twelve (12) hours. MONTELUKAST (SINGULAIR) 10 MG TABLET    Take 1 Tab by mouth nightly. NALOXONE (NARCAN) 4 MG/ACTUATION NASAL SPRAY    Use 1 spray intranasally, then discard. Repeat with new spray every 2 min as needed for opioid overdose symptoms, alternating nostrils. ONDANSETRON (ZOFRAN ODT) 4 MG DISINTEGRATING TABLET    Take 1 Tab by mouth every eight (8) hours as needed for Nausea. Indications: prevent nausea and vomiting after surgery    OTHER    Incentive spirometry- use as directed    OXYGEN-AIR DELIVERY SYSTEMS    3 L by IntraNASal route as needed for Other (sob). ROFLUMILAST (DALIRESP) 500 MCG TAB TABLET    Take 1 Tab by mouth daily. TIOTROPIUM (SPIRIVA) 18 MCG INHALATION CAPSULE    Take 1 Cap by inhalation daily.    These Medications have changed    No medications on file   Stop Taking    No medications on file          Sarina Spain MD

## 2020-06-15 ENCOUNTER — PATIENT OUTREACH (OUTPATIENT)
Dept: CASE MANAGEMENT | Age: 55
End: 2020-06-15

## 2020-06-15 VITALS
WEIGHT: 160 LBS | HEIGHT: 59 IN | HEART RATE: 70 BPM | OXYGEN SATURATION: 98 % | DIASTOLIC BLOOD PRESSURE: 79 MMHG | SYSTOLIC BLOOD PRESSURE: 183 MMHG | BODY MASS INDEX: 32.25 KG/M2 | RESPIRATION RATE: 16 BRPM | TEMPERATURE: 98.1 F

## 2020-06-15 LAB
ATRIAL RATE: 64 BPM
ATRIAL RATE: 64 BPM
ATRIAL RATE: 65 BPM
ATRIAL RATE: 71 BPM
CALCULATED P AXIS, ECG09: 76 DEGREES
CALCULATED P AXIS, ECG09: 76 DEGREES
CALCULATED P AXIS, ECG09: 82 DEGREES
CALCULATED P AXIS, ECG09: 82 DEGREES
CALCULATED R AXIS, ECG10: -16 DEGREES
CALCULATED R AXIS, ECG10: -23 DEGREES
CALCULATED R AXIS, ECG10: 4 DEGREES
CALCULATED R AXIS, ECG10: 69 DEGREES
CALCULATED T AXIS, ECG11: 112 DEGREES
CALCULATED T AXIS, ECG11: 77 DEGREES
CALCULATED T AXIS, ECG11: 82 DEGREES
CALCULATED T AXIS, ECG11: 95 DEGREES
DIAGNOSIS, 93000: NORMAL
P-R INTERVAL, ECG05: 144 MS
P-R INTERVAL, ECG05: 150 MS
P-R INTERVAL, ECG05: 152 MS
P-R INTERVAL, ECG05: 158 MS
Q-T INTERVAL, ECG07: 450 MS
Q-T INTERVAL, ECG07: 466 MS
Q-T INTERVAL, ECG07: 472 MS
Q-T INTERVAL, ECG07: 474 MS
QRS DURATION, ECG06: 134 MS
QRS DURATION, ECG06: 136 MS
QRS DURATION, ECG06: 136 MS
QRS DURATION, ECG06: 84 MS
QTC CALCULATION (BEZET), ECG08: 468 MS
QTC CALCULATION (BEZET), ECG08: 480 MS
QTC CALCULATION (BEZET), ECG08: 490 MS
QTC CALCULATION (BEZET), ECG08: 515 MS
VENTRICULAR RATE, ECG03: 64 BPM
VENTRICULAR RATE, ECG03: 65 BPM
VENTRICULAR RATE, ECG03: 65 BPM
VENTRICULAR RATE, ECG03: 71 BPM

## 2020-06-15 NOTE — DISCHARGE INSTRUCTIONS
Patient Education        Abdominal Pain: Care Instructions  Your Care Instructions     Abdominal pain has many possible causes. Some aren't serious and get better on their own in a few days. Others need more testing and treatment. If your pain continues or gets worse, you need to be rechecked and may need more tests to find out what is wrong. You may need surgery to correct the problem. Don't ignore new symptoms, such as fever, nausea and vomiting, urination problems, pain that gets worse, and dizziness. These may be signs of a more serious problem. Your doctor may have recommended a follow-up visit in the next 8 to 12 hours. If you are not getting better, you may need more tests or treatment. The doctor has checked you carefully, but problems can develop later. If you notice any problems or new symptoms, get medical treatment right away. Follow-up care is a key part of your treatment and safety. Be sure to make and go to all appointments, and call your doctor if you are having problems. It's also a good idea to know your test results and keep a list of the medicines you take. How can you care for yourself at home? · Rest until you feel better. · To prevent dehydration, drink plenty of fluids, enough so that your urine is light yellow or clear like water. Choose water and other caffeine-free clear liquids until you feel better. If you have kidney, heart, or liver disease and have to limit fluids, talk with your doctor before you increase the amount of fluids you drink. · If your stomach is upset, eat mild foods, such as rice, dry toast or crackers, bananas, and applesauce. Try eating several small meals instead of two or three large ones. · Wait until 48 hours after all symptoms have gone away before you have spicy foods, alcohol, and drinks that contain caffeine. · Do not eat foods that are high in fat. · Avoid anti-inflammatory medicines such as aspirin, ibuprofen (Advil, Motrin), and naproxen (Aleve). These can cause stomach upset. Talk to your doctor if you take daily aspirin for another health problem. When should you call for help? [id number] anytime you think you may need emergency care. For example, call if:  · You passed out (lost consciousness). · You pass maroon or very bloody stools. · You vomit blood or what looks like coffee grounds. · You have new, severe belly pain. Call your doctor now or seek immediate medical care if:  · Your pain gets worse, especially if it becomes focused in one area of your belly. · You have a new or higher fever. · Your stools are black and look like tar, or they have streaks of blood. · You have unexpected vaginal bleeding. · You have symptoms of a urinary tract infection. These may include:  ? Pain when you urinate. ? Urinating more often than usual.  ? Blood in your urine. · You are dizzy or lightheaded, or you feel like you may faint. Watch closely for changes in your health, and be sure to contact your doctor if:  · You are not getting better after 1 day (24 hours). Where can you learn more? Go to http://magda-kadie.info/  Enter U876 in the search box to learn more about \"Abdominal Pain: Care Instructions. \"  Current as of: June 26, 2019               Content Version: 12.5  © 0069-2652 Healthwise[employer]. Care instructions adapted under license by [employer] (which disclaims liability or warranty for this information). If you have questions about a medical condition or this instruction, always ask your healthcare professional. [place] [address] any warranty or liability for your use of this information. Patient Education        Chest Pain: Care Instructions  Your Care Instructions     There are many things that can cause chest pain. Some are not serious and will get better on their own in a few days. But some kinds of chest pain need more testing and treatment.  Your doctor may have recommended a follow-up visit in the next 8 to 12 hours. If you are not getting better, you may need more tests or treatment. Even though your doctor has released you, you still need to watch for any problems. The doctor carefully checked you, but sometimes problems can develop later. If you have new symptoms or if your symptoms do not get better, get medical care right away. If you have worse or different chest pain or pressure that lasts more than 5 minutes or you passed out (lost consciousness), [id number] or seek other emergency help right away. A medical visit is only one step in your treatment. Even if you feel better, you still need to do what your doctor recommends, such as going to all suggested follow-up appointments and taking medicines exactly as directed. This will help you recover and help prevent future problems. How can you care for yourself at home? · Rest until you feel better. · Take your medicine exactly as prescribed. Call your doctor if you think you are having a problem with your medicine. · Do not drive after taking a prescription pain medicine. When should you call for help? [id number]:   · You passed out (lost consciousness). · You have severe difficulty breathing. · You have symptoms of a heart attack. These may include:  ? Chest pain or pressure, or a strange feeling in your chest.  ? Sweating. ? Shortness of breath. ? Nausea or vomiting. ? Pain, pressure, or a strange feeling in your back, neck, jaw, or upper belly or in one or both shoulders or arms. ? Lightheadedness or sudden weakness. ? A fast or irregular heartbeat. After you call 911, the  may tell you to chew 1 adult-strength or 2 to 4 low-dose aspirin. Wait for an ambulance. Do not try to drive yourself. Call your doctor today if:   · You have any trouble breathing. · Your chest pain gets worse. · You are dizzy or lightheaded, or you feel like you may faint. · You are not getting better as expected.   · You are having new or different chest pain. Where can you learn more? Go to http://magda-kadie.info/  Enter A120 in the search box to learn more about \"Chest Pain: Care Instructions. \"  Current as of: June 26, 2019               Content Version: 12.5  © 3698-6937 Healthwise, Incorporated. Care instructions adapted under license by [employer] (which disclaims liability or warranty for this information). If you have questions about a medical condition or this instruction, always ask your healthcare professional. [address] any warranty or liability for your use of this information.

## 2020-06-15 NOTE — ED NOTES
Patient discharge. Patient was advised to follow up with cardiologist. Informed patient that prescription was called in to the pharmacist. Patient acknowledge that she understood. Escorted patient to the waiting room by wheelchair.

## 2020-06-15 NOTE — ED NOTES
Addendum:    Patient care transferred to myself from Dr. Camelia Bumpers pending second set of cardiac enzymes. Patient is well-known to the emergency department and frequently presents with chest pain. Today she was presenting with chest pain and abdominal pain. CT abdomen pelvis shows no acute intra-abdominal process. Patient serial cardiac enzymes are negative and no concerning ischemic changes on her EKG. Has chronic left bundle branch block consistent with prior EKGs. Advised the patient to follow-up with her cardiologist and her primary doctor. I gave return precautions for recurrent or worsening symptoms. No tachycardia, tachypnea, or hypoxia to suggest pulmonary embolism.     Eddie Lucero MD

## 2020-06-15 NOTE — PROGRESS NOTES
Date/Time:  6/15/2020 1:44 PM  Attempted to reach patient by telephone. Left HIPPA compliant message requesting a return call. Will attempt to reach patient again.

## 2020-06-16 ENCOUNTER — HOSPITAL ENCOUNTER (EMERGENCY)
Age: 55
Discharge: HOME | End: 2020-06-16
Attending: EMERGENCY MEDICINE
Payer: MEDICAID

## 2020-06-16 ENCOUNTER — APPOINTMENT (OUTPATIENT)
Dept: GENERAL RADIOLOGY | Age: 55
End: 2020-06-16
Attending: EMERGENCY MEDICINE
Payer: MEDICAID

## 2020-06-16 VITALS
OXYGEN SATURATION: 100 % | DIASTOLIC BLOOD PRESSURE: 70 MMHG | RESPIRATION RATE: 12 BRPM | SYSTOLIC BLOOD PRESSURE: 155 MMHG | TEMPERATURE: 97.8 F | HEART RATE: 79 BPM

## 2020-06-16 DIAGNOSIS — R06.02 SOB (SHORTNESS OF BREATH): ICD-10-CM

## 2020-06-16 DIAGNOSIS — F41.1 ANXIETY REACTION: Primary | ICD-10-CM

## 2020-06-16 DIAGNOSIS — I10 HYPERTENSION, UNSPECIFIED TYPE: ICD-10-CM

## 2020-06-16 LAB
ALBUMIN SERPL-MCNC: 3.8 G/DL (ref 3.4–5)
ALBUMIN/GLOB SERPL: 1.2 {RATIO} (ref 0.8–1.7)
ALP SERPL-CCNC: 85 U/L (ref 45–117)
ALT SERPL-CCNC: 19 U/L (ref 13–56)
ANION GAP SERPL CALC-SCNC: 5 MMOL/L (ref 3–18)
AST SERPL-CCNC: 6 U/L (ref 10–38)
ATRIAL RATE: 71 BPM
BASOPHILS # BLD: 0 K/UL (ref 0–0.1)
BASOPHILS NFR BLD: 0 % (ref 0–2)
BILIRUB SERPL-MCNC: 0.4 MG/DL (ref 0.2–1)
BUN SERPL-MCNC: 15 MG/DL (ref 7–18)
BUN/CREAT SERPL: 17 (ref 12–20)
CALCIUM SERPL-MCNC: 8.7 MG/DL (ref 8.5–10.1)
CALCULATED P AXIS, ECG09: 78 DEGREES
CALCULATED R AXIS, ECG10: 0 DEGREES
CALCULATED T AXIS, ECG11: 98 DEGREES
CHLORIDE SERPL-SCNC: 109 MMOL/L (ref 100–111)
CK MB CFR SERPL CALC: 3.4 % (ref 0–4)
CK MB SERPL-MCNC: 1.4 NG/ML (ref 5–25)
CK SERPL-CCNC: 41 U/L (ref 26–192)
CO2 SERPL-SCNC: 27 MMOL/L (ref 21–32)
CREAT SERPL-MCNC: 0.9 MG/DL (ref 0.6–1.3)
DIAGNOSIS, 93000: NORMAL
DIFFERENTIAL METHOD BLD: NORMAL
EOSINOPHIL # BLD: 0.1 K/UL (ref 0–0.4)
EOSINOPHIL NFR BLD: 1 % (ref 0–5)
ERYTHROCYTE [DISTWIDTH] IN BLOOD BY AUTOMATED COUNT: 12.8 % (ref 11.6–14.5)
GLOBULIN SER CALC-MCNC: 3.3 G/DL (ref 2–4)
GLUCOSE SERPL-MCNC: 104 MG/DL (ref 74–99)
HCT VFR BLD AUTO: 39.8 % (ref 35–45)
HGB BLD-MCNC: 12.8 G/DL (ref 12–16)
LYMPHOCYTES # BLD: 2.1 K/UL (ref 0.9–3.6)
LYMPHOCYTES NFR BLD: 25 % (ref 21–52)
MCH RBC QN AUTO: 28.3 PG (ref 24–34)
MCHC RBC AUTO-ENTMCNC: 32.2 G/DL (ref 31–37)
MCV RBC AUTO: 87.9 FL (ref 74–97)
MONOCYTES # BLD: 0.6 K/UL (ref 0.05–1.2)
MONOCYTES NFR BLD: 7 % (ref 3–10)
NEUTS SEG # BLD: 5.8 K/UL (ref 1.8–8)
NEUTS SEG NFR BLD: 67 % (ref 40–73)
P-R INTERVAL, ECG05: 134 MS
PLATELET # BLD AUTO: 198 K/UL (ref 135–420)
PMV BLD AUTO: 10.9 FL (ref 9.2–11.8)
POTASSIUM SERPL-SCNC: 3.5 MMOL/L (ref 3.5–5.5)
PROT SERPL-MCNC: 7.1 G/DL (ref 6.4–8.2)
Q-T INTERVAL, ECG07: 430 MS
QRS DURATION, ECG06: 134 MS
QTC CALCULATION (BEZET), ECG08: 467 MS
RBC # BLD AUTO: 4.53 M/UL (ref 4.2–5.3)
SODIUM SERPL-SCNC: 141 MMOL/L (ref 136–145)
TROPONIN I SERPL-MCNC: <0.02 NG/ML (ref 0–0.04)
VENTRICULAR RATE, ECG03: 71 BPM
WBC # BLD AUTO: 8.5 K/UL (ref 4.6–13.2)

## 2020-06-16 PROCEDURE — 82550 ASSAY OF CK (CPK): CPT

## 2020-06-16 PROCEDURE — 85025 COMPLETE CBC W/AUTO DIFF WBC: CPT

## 2020-06-16 PROCEDURE — 74011250637 HC RX REV CODE- 250/637: Performed by: STUDENT IN AN ORGANIZED HEALTH CARE EDUCATION/TRAINING PROGRAM

## 2020-06-16 PROCEDURE — 80053 COMPREHEN METABOLIC PANEL: CPT

## 2020-06-16 PROCEDURE — 99284 EMERGENCY DEPT VISIT MOD MDM: CPT

## 2020-06-16 PROCEDURE — 71045 X-RAY EXAM CHEST 1 VIEW: CPT

## 2020-06-16 PROCEDURE — 93005 ELECTROCARDIOGRAM TRACING: CPT

## 2020-06-16 RX ORDER — ACETAMINOPHEN 325 MG/1
650 TABLET ORAL
Status: DISCONTINUED | OUTPATIENT
Start: 2020-06-16 | End: 2020-06-16 | Stop reason: HOSPADM

## 2020-06-16 RX ORDER — ALBUTEROL SULFATE 90 UG/1
2 AEROSOL, METERED RESPIRATORY (INHALATION)
Status: COMPLETED | OUTPATIENT
Start: 2020-06-16 | End: 2020-06-16

## 2020-06-16 RX ORDER — ALPRAZOLAM 0.5 MG/1
0.5 TABLET ORAL
Qty: 20 TAB | Refills: 0 | Status: ON HOLD | OUTPATIENT
Start: 2020-06-16 | End: 2020-12-01 | Stop reason: SDUPTHER

## 2020-06-16 RX ORDER — KETOROLAC TROMETHAMINE 15 MG/ML
15 INJECTION, SOLUTION INTRAMUSCULAR; INTRAVENOUS
Status: DISCONTINUED | OUTPATIENT
Start: 2020-06-16 | End: 2020-06-16 | Stop reason: HOSPADM

## 2020-06-16 RX ORDER — CYCLOBENZAPRINE HCL 10 MG
10 TABLET ORAL
Status: COMPLETED | OUTPATIENT
Start: 2020-06-16 | End: 2020-06-16

## 2020-06-16 RX ADMIN — ALBUTEROL SULFATE 2 PUFF: 90 AEROSOL, METERED RESPIRATORY (INHALATION) at 09:26

## 2020-06-16 RX ADMIN — CYCLOBENZAPRINE 10 MG: 10 TABLET, FILM COATED ORAL at 08:56

## 2020-06-16 NOTE — ED NOTES
Pt refusing Tylenol and Toradol at this time. States \"I'm not taking that shit, I need narcotics and the doctor knows that. \" Explained to pt that this is what the MD ordered for her. Pt verbally aggressive and continuing to refuse medication. MD to be made aware.

## 2020-06-16 NOTE — ED NOTES
Pt discharge instructions reviewed with patient, patient denies questions and verbalizes understanding. IVs removed and all belongings with patient. Pt alert and oriented, no signs of distress.

## 2020-06-16 NOTE — ED TRIAGE NOTES
Patient presents to the ED via EMS with complaints of shortness of breath. Per EMS patient was at 90% on room air. Patient given 1 duoneb with oxygen saturation to 99% room air upon arrival. Patient is able to speak in full sentences. Patient alert and oriented x 4. No obvious signs of distress noted.

## 2020-06-16 NOTE — ED TRIAGE NOTES
Patient brought in by medic with complaint of shortness of breath. Pt states she came in because she lost all circulation in her legs and her arms. States she has been asking to have her legs checked for blood clots for past two years.

## 2020-06-16 NOTE — ED PROVIDER NOTES
HPI   50yo female patient with a phmx of HTN, obstructive lung disease and anxiety who presents from home via EMS for shortness of breath and pain in both legs and arms that resulted in patient not being able to walk. Pain started last night around midnight. Patient describes the shortness of breath as her baseline requiring multiple medications and regular inhaler use. No change in nature, no increased cough or sputum production. No chest pain or leg swelling. Patient endorses ongoing tobacco use. Pain in legs and arms described as constant 'zinging' pain related like she has 'bad circulation'. Has not tried any otc pain medications. Pain not alleviated by rest, not made worse by any identifiable cause    Past Medical History:   Diagnosis Date    Asthma     CHF (congestive heart failure) (HCC)     Chronic obstructive pulmonary disease (HCC)     Dependence on supplemental oxygen     Endocrine disease     thyroid issues    Gastrointestinal disorder     \"blockage in my stomach\"    Hypercholesterolemia     Hypertension        Past Surgical History:   Procedure Laterality Date    HX GI      Exploratory laparotomy with lysis of adhesions and primary repair of incarcerated umbilical hernia         History reviewed. No pertinent family history.     Social History     Socioeconomic History    Marital status: SINGLE     Spouse name: Not on file    Number of children: Not on file    Years of education: Not on file    Highest education level: Not on file   Occupational History    Not on file   Social Needs    Financial resource strain: Not on file    Food insecurity     Worry: Not on file     Inability: Not on file    Transportation needs     Medical: Not on file     Non-medical: Not on file   Tobacco Use    Smoking status: Current Every Day Smoker     Packs/day: 0.25    Smokeless tobacco: Former User   Substance and Sexual Activity    Alcohol use: No     Comment: socially    Drug use: Not Currently Types: Heroin    Sexual activity: Never     Comment: last used 9 years ago   Lifestyle    Physical activity     Days per week: Not on file     Minutes per session: Not on file    Stress: Not on file   Relationships    Social connections     Talks on phone: Not on file     Gets together: Not on file     Attends Hindu service: Not on file     Active member of club or organization: Not on file     Attends meetings of clubs or organizations: Not on file     Relationship status: Not on file    Intimate partner violence     Fear of current or ex partner: Not on file     Emotionally abused: Not on file     Physically abused: Not on file     Forced sexual activity: Not on file   Other Topics Concern    Not on file   Social History Narrative    Not on file         ALLERGIES: Patient has no known allergies. Review of Systems   Constitutional: Negative for appetite change, chills, diaphoresis, fatigue and fever. HENT: Negative for congestion, rhinorrhea, sinus pressure and sinus pain. Eyes: Negative for photophobia and visual disturbance. Respiratory: Positive for chest tightness. Negative for cough and shortness of breath. Cardiovascular: Negative for chest pain, palpitations and leg swelling. Gastrointestinal: Negative for abdominal pain, constipation, diarrhea, nausea and vomiting. Genitourinary: Positive for dysuria. Negative for flank pain. Musculoskeletal: Positive for arthralgias, gait problem and myalgias. Negative for back pain, joint swelling, neck pain and neck stiffness. Skin: Negative for wound. Neurological: Positive for weakness and numbness. Negative for dizziness, seizures, syncope, light-headedness and headaches. Psychiatric/Behavioral: Negative for confusion. Vitals:    06/16/20 0727   BP: 138/60   Pulse: 79   Resp: 12   Temp: 97.8 °F (36.6 °C)   SpO2: 98%            Physical Exam  Vitals signs and nursing note reviewed.    Constitutional:       General: She is not in acute distress. Appearance: She is well-developed. HENT:      Head: Normocephalic. Mouth/Throat:      Mouth: Mucous membranes are moist.   Eyes:      Pupils: Pupils are equal, round, and reactive to light. Neck:      Musculoskeletal: Normal range of motion and neck supple. Cardiovascular:      Rate and Rhythm: Normal rate and regular rhythm. Pulmonary:      Effort: Pulmonary effort is normal.      Breath sounds: Normal breath sounds. No decreased breath sounds or wheezing. Chest:      Chest wall: No tenderness. Abdominal:      General: Bowel sounds are normal.      Palpations: Abdomen is soft. Musculoskeletal: Normal range of motion. Skin:     General: Skin is warm. Capillary Refill: Capillary refill takes less than 2 seconds. Neurological:      General: No focal deficit present. Mental Status: She is alert. Psychiatric:         Mood and Affect: Affect is labile and angry. Speech: Speech normal.         Thought Content: Thought content normal.          MDM   53yo F brought in my EMS initially for complaints of SOB but on exam revealed to have 2 years of bilateral leg weakness and pain that she feels is because of  \"bad circulation\". This weakness and pain got so bad today that she was unable to ambulate and had to call EMS. Vitals reviewed. Exam regular cardiac rate and rhythm. Lungs ctab. Neuro exam notable for patient jumping off the bed with all palpation of upper and lower extremities as well as placement of EKG leads. SOB work up ordered out of triage without significant abnormality, appearance of stable pulm vascular congestion. Labs without significant abnormality    Suspect that patient is having anxiety reactions given her stressful family situation, to include the recent death of mother and brother, brother's  being today. And that leg pain/weakness is secondary to anxiety attack/reaction.     Attempted to treat patient's pain initially with toradol and tylenol but patient refused stating that 'the doctor knows I want narcotics'. Patient then became agitated, yelling at nursing and physician staff and was ambulating around her room, able to ambulate to the bathroom. On reevaluation, patient was more calm and became tearful throughout interview when talking about her life stressors, specifically related to the death of her mother and brother with in the past 2 weeks. Patient continues to refuse toradol and tylenol, did accept flexeril. Shared decision making with patient to discharge so that she can attend her 1000 . Patient reports that she will need an albuterol inhaler, will refill that and provide few Xanax for use as an outpatient.         Procedures

## 2020-06-17 ENCOUNTER — PATIENT OUTREACH (OUTPATIENT)
Dept: CASE MANAGEMENT | Age: 55
End: 2020-06-17

## 2020-06-17 NOTE — PROGRESS NOTES
Date/Time:  6/17/2020 2:39 PM  Attempted to reach patient by telephone. Left HIPPA compliant message requesting a return call. Will attempt to reach patient again.

## 2020-06-22 ENCOUNTER — HOSPITAL ENCOUNTER (EMERGENCY)
Age: 55
Discharge: HOME | End: 2020-06-23
Attending: EMERGENCY MEDICINE
Payer: MEDICAID

## 2020-06-22 ENCOUNTER — APPOINTMENT (OUTPATIENT)
Dept: GENERAL RADIOLOGY | Age: 55
End: 2020-06-22
Attending: EMERGENCY MEDICINE
Payer: MEDICAID

## 2020-06-22 DIAGNOSIS — R06.02 SOB (SHORTNESS OF BREATH): Primary | ICD-10-CM

## 2020-06-22 LAB
ALBUMIN SERPL-MCNC: 4.1 G/DL (ref 3.4–5)
ALBUMIN/GLOB SERPL: 1 {RATIO} (ref 0.8–1.7)
ALP SERPL-CCNC: 91 U/L (ref 45–117)
ALT SERPL-CCNC: 20 U/L (ref 13–56)
ANION GAP SERPL CALC-SCNC: 4 MMOL/L (ref 3–18)
APTT PPP: 27.8 SEC (ref 23–36.4)
AST SERPL-CCNC: 10 U/L (ref 10–38)
BASOPHILS # BLD: 0 K/UL (ref 0–0.1)
BASOPHILS NFR BLD: 0 % (ref 0–2)
BILIRUB SERPL-MCNC: 0.6 MG/DL (ref 0.2–1)
BNP SERPL-MCNC: 4968 PG/ML (ref 0–900)
BUN SERPL-MCNC: 21 MG/DL (ref 7–18)
BUN/CREAT SERPL: 15 (ref 12–20)
CALCIUM SERPL-MCNC: 9.9 MG/DL (ref 8.5–10.1)
CHLORIDE SERPL-SCNC: 106 MMOL/L (ref 100–111)
CK MB CFR SERPL CALC: 2.1 % (ref 0–4)
CK MB SERPL-MCNC: 1.2 NG/ML (ref 5–25)
CK SERPL-CCNC: 57 U/L (ref 26–192)
CO2 SERPL-SCNC: 32 MMOL/L (ref 21–32)
CREAT SERPL-MCNC: 1.41 MG/DL (ref 0.6–1.3)
DIFFERENTIAL METHOD BLD: ABNORMAL
EOSINOPHIL # BLD: 0.1 K/UL (ref 0–0.4)
EOSINOPHIL NFR BLD: 1 % (ref 0–5)
ERYTHROCYTE [DISTWIDTH] IN BLOOD BY AUTOMATED COUNT: 12.3 % (ref 11.6–14.5)
GLOBULIN SER CALC-MCNC: 4 G/DL (ref 2–4)
GLUCOSE SERPL-MCNC: 119 MG/DL (ref 74–99)
HCT VFR BLD AUTO: 46.3 % (ref 35–45)
HGB BLD-MCNC: 15.4 G/DL (ref 12–16)
INR PPP: 1 (ref 0.8–1.2)
LYMPHOCYTES # BLD: 2.3 K/UL (ref 0.9–3.6)
LYMPHOCYTES NFR BLD: 27 % (ref 21–52)
MAGNESIUM SERPL-MCNC: 2.1 MG/DL (ref 1.6–2.6)
MCH RBC QN AUTO: 29.2 PG (ref 24–34)
MCHC RBC AUTO-ENTMCNC: 33.3 G/DL (ref 31–37)
MCV RBC AUTO: 87.7 FL (ref 74–97)
MONOCYTES # BLD: 0.6 K/UL (ref 0.05–1.2)
MONOCYTES NFR BLD: 6 % (ref 3–10)
NEUTS SEG # BLD: 5.6 K/UL (ref 1.8–8)
NEUTS SEG NFR BLD: 66 % (ref 40–73)
PLATELET # BLD AUTO: 260 K/UL (ref 135–420)
PMV BLD AUTO: 11.1 FL (ref 9.2–11.8)
POTASSIUM SERPL-SCNC: 3.3 MMOL/L (ref 3.5–5.5)
PROT SERPL-MCNC: 8.1 G/DL (ref 6.4–8.2)
PROTHROMBIN TIME: 13.3 SEC (ref 11.5–15.2)
RBC # BLD AUTO: 5.28 M/UL (ref 4.2–5.3)
SODIUM SERPL-SCNC: 142 MMOL/L (ref 136–145)
TROPONIN I SERPL-MCNC: <0.02 NG/ML (ref 0–0.04)
WBC # BLD AUTO: 8.6 K/UL (ref 4.6–13.2)

## 2020-06-22 PROCEDURE — 99285 EMERGENCY DEPT VISIT HI MDM: CPT

## 2020-06-22 PROCEDURE — 82550 ASSAY OF CK (CPK): CPT

## 2020-06-22 PROCEDURE — 83735 ASSAY OF MAGNESIUM: CPT

## 2020-06-22 PROCEDURE — 80053 COMPREHEN METABOLIC PANEL: CPT

## 2020-06-22 PROCEDURE — 93005 ELECTROCARDIOGRAM TRACING: CPT

## 2020-06-22 PROCEDURE — 85730 THROMBOPLASTIN TIME PARTIAL: CPT

## 2020-06-22 PROCEDURE — 83880 ASSAY OF NATRIURETIC PEPTIDE: CPT

## 2020-06-22 PROCEDURE — 85025 COMPLETE CBC W/AUTO DIFF WBC: CPT

## 2020-06-22 PROCEDURE — 74011250636 HC RX REV CODE- 250/636: Performed by: EMERGENCY MEDICINE

## 2020-06-22 PROCEDURE — 96374 THER/PROPH/DIAG INJ IV PUSH: CPT

## 2020-06-22 PROCEDURE — 74011250637 HC RX REV CODE- 250/637: Performed by: EMERGENCY MEDICINE

## 2020-06-22 PROCEDURE — 85610 PROTHROMBIN TIME: CPT

## 2020-06-22 PROCEDURE — 71045 X-RAY EXAM CHEST 1 VIEW: CPT

## 2020-06-22 RX ORDER — AMLODIPINE BESYLATE 10 MG/1
10 TABLET ORAL
Status: COMPLETED | OUTPATIENT
Start: 2020-06-22 | End: 2020-06-22

## 2020-06-22 RX ORDER — ALBUTEROL SULFATE 90 UG/1
2 AEROSOL, METERED RESPIRATORY (INHALATION)
Qty: 1 INHALER | Refills: 0 | OUTPATIENT
Start: 2020-06-22 | End: 2020-07-06

## 2020-06-22 RX ORDER — CLONIDINE HYDROCHLORIDE 0.1 MG/1
0.2 TABLET ORAL
Status: COMPLETED | OUTPATIENT
Start: 2020-06-22 | End: 2020-06-22

## 2020-06-22 RX ORDER — FUROSEMIDE 10 MG/ML
40 INJECTION INTRAMUSCULAR; INTRAVENOUS
Status: COMPLETED | OUTPATIENT
Start: 2020-06-22 | End: 2020-06-22

## 2020-06-22 RX ORDER — ALPRAZOLAM 0.25 MG/1
0.5 TABLET ORAL
Status: COMPLETED | OUTPATIENT
Start: 2020-06-22 | End: 2020-06-22

## 2020-06-22 RX ORDER — IPRATROPIUM BROMIDE AND ALBUTEROL SULFATE 2.5; .5 MG/3ML; MG/3ML
3 SOLUTION RESPIRATORY (INHALATION)
Status: DISCONTINUED | OUTPATIENT
Start: 2020-06-22 | End: 2020-06-23 | Stop reason: HOSPADM

## 2020-06-22 RX ORDER — METOPROLOL TARTRATE 25 MG/1
25 TABLET, FILM COATED ORAL
Status: COMPLETED | OUTPATIENT
Start: 2020-06-22 | End: 2020-06-22

## 2020-06-22 RX ORDER — CLONIDINE HYDROCHLORIDE 0.2 MG/1
0.2 TABLET ORAL 2 TIMES DAILY
Qty: 30 TAB | Refills: 1 | Status: SHIPPED | OUTPATIENT
Start: 2020-06-22 | End: 2020-09-28

## 2020-06-22 RX ORDER — AMLODIPINE BESYLATE 10 MG/1
10 TABLET ORAL DAILY
Qty: 15 TAB | Refills: 0 | Status: SHIPPED | OUTPATIENT
Start: 2020-06-22 | End: 2020-10-10

## 2020-06-22 RX ORDER — LISINOPRIL 20 MG/1
20 TABLET ORAL
Status: DISCONTINUED | OUTPATIENT
Start: 2020-06-22 | End: 2020-06-22

## 2020-06-22 RX ADMIN — AMLODIPINE BESYLATE 10 MG: 10 TABLET ORAL at 23:59

## 2020-06-22 RX ADMIN — METOPROLOL TARTRATE 25 MG: 25 TABLET, FILM COATED ORAL at 23:59

## 2020-06-22 RX ADMIN — NITROGLYCERIN 1 INCH: 20 OINTMENT TOPICAL at 20:32

## 2020-06-22 RX ADMIN — CLONIDINE HYDROCHLORIDE 0.2 MG: 0.1 TABLET ORAL at 19:19

## 2020-06-22 RX ADMIN — ALPRAZOLAM 0.5 MG: 0.25 TABLET ORAL at 23:59

## 2020-06-22 RX ADMIN — FUROSEMIDE 40 MG: 10 INJECTION, SOLUTION INTRAMUSCULAR; INTRAVENOUS at 20:33

## 2020-06-22 NOTE — ED TRIAGE NOTES
PT arrived via ems from home with complaints of hypertension and chest pain X2 days. Pt received 1 nitro and aspirin in medic.  PTs BP was 250/140 in [employer]

## 2020-06-23 ENCOUNTER — PATIENT OUTREACH (OUTPATIENT)
Dept: CASE MANAGEMENT | Age: 55
End: 2020-06-23

## 2020-06-23 ENCOUNTER — HOSPITAL ENCOUNTER (EMERGENCY)
Age: 55
Discharge: HOME | End: 2020-06-23
Attending: EMERGENCY MEDICINE | Admitting: EMERGENCY MEDICINE
Payer: MEDICAID

## 2020-06-23 VITALS
TEMPERATURE: 98.1 F | RESPIRATION RATE: 18 BRPM | OXYGEN SATURATION: 96 % | HEART RATE: 78 BPM | SYSTOLIC BLOOD PRESSURE: 158 MMHG | DIASTOLIC BLOOD PRESSURE: 88 MMHG

## 2020-06-23 VITALS
BODY MASS INDEX: 31.25 KG/M2 | HEIGHT: 59 IN | OXYGEN SATURATION: 98 % | RESPIRATION RATE: 20 BRPM | DIASTOLIC BLOOD PRESSURE: 87 MMHG | WEIGHT: 155 LBS | HEART RATE: 96 BPM | SYSTOLIC BLOOD PRESSURE: 137 MMHG | TEMPERATURE: 97.4 F

## 2020-06-23 VITALS
SYSTOLIC BLOOD PRESSURE: 201 MMHG | DIASTOLIC BLOOD PRESSURE: 132 MMHG | OXYGEN SATURATION: 97 % | RESPIRATION RATE: 20 BRPM | HEART RATE: 88 BPM

## 2020-06-23 DIAGNOSIS — R10.84 ABDOMINAL PAIN, GENERALIZED: Primary | ICD-10-CM

## 2020-06-23 DIAGNOSIS — F41.1 ANXIETY STATE: ICD-10-CM

## 2020-06-23 DIAGNOSIS — R07.9 CHEST PAIN, UNSPECIFIED TYPE: Primary | ICD-10-CM

## 2020-06-23 LAB
APPEARANCE UR: ABNORMAL
ATRIAL RATE: 73 BPM
ATRIAL RATE: 76 BPM
ATRIAL RATE: 86 BPM
BACTERIA URNS QL MICRO: ABNORMAL /HPF
BILIRUB UR QL: NEGATIVE
CALCULATED P AXIS, ECG09: 56 DEGREES
CALCULATED P AXIS, ECG09: 68 DEGREES
CALCULATED P AXIS, ECG09: 73 DEGREES
CALCULATED R AXIS, ECG10: -23 DEGREES
CALCULATED R AXIS, ECG10: -41 DEGREES
CALCULATED R AXIS, ECG10: -44 DEGREES
CALCULATED T AXIS, ECG11: -125 DEGREES
CALCULATED T AXIS, ECG11: 119 DEGREES
CALCULATED T AXIS, ECG11: 89 DEGREES
COLOR UR: YELLOW
DIAGNOSIS, 93000: NORMAL
EPITH CASTS URNS QL MICRO: ABNORMAL /LPF (ref 0–5)
GLUCOSE UR STRIP.AUTO-MCNC: NEGATIVE MG/DL
HGB UR QL STRIP: NEGATIVE
KETONES UR QL STRIP.AUTO: NEGATIVE MG/DL
LEUKOCYTE ESTERASE UR QL STRIP.AUTO: ABNORMAL
NITRITE UR QL STRIP.AUTO: NEGATIVE
P-R INTERVAL, ECG05: 130 MS
P-R INTERVAL, ECG05: 136 MS
P-R INTERVAL, ECG05: 140 MS
PH UR STRIP: 5 [PH] (ref 5–8)
PROT UR STRIP-MCNC: ABNORMAL MG/DL
Q-T INTERVAL, ECG07: 420 MS
Q-T INTERVAL, ECG07: 456 MS
Q-T INTERVAL, ECG07: 462 MS
QRS DURATION, ECG06: 120 MS
QRS DURATION, ECG06: 124 MS
QRS DURATION, ECG06: 128 MS
QTC CALCULATION (BEZET), ECG08: 502 MS
QTC CALCULATION (BEZET), ECG08: 508 MS
QTC CALCULATION (BEZET), ECG08: 513 MS
RBC #/AREA URNS HPF: ABNORMAL /HPF (ref 0–5)
SP GR UR REFRACTOMETRY: 1.02 (ref 1–1.03)
TROPONIN I SERPL-MCNC: <0.02 NG/ML (ref 0–0.04)
UROBILINOGEN UR QL STRIP.AUTO: 1 EU/DL (ref 0.2–1)
VENTRICULAR RATE, ECG03: 73 BPM
VENTRICULAR RATE, ECG03: 76 BPM
VENTRICULAR RATE, ECG03: 86 BPM
WBC URNS QL MICRO: ABNORMAL /HPF (ref 0–4)

## 2020-06-23 PROCEDURE — 81001 URINALYSIS AUTO W/SCOPE: CPT

## 2020-06-23 PROCEDURE — 74011250637 HC RX REV CODE- 250/637: Performed by: EMERGENCY MEDICINE

## 2020-06-23 PROCEDURE — C9113 INJ PANTOPRAZOLE SODIUM, VIA: HCPCS | Performed by: EMERGENCY MEDICINE

## 2020-06-23 PROCEDURE — 84484 ASSAY OF TROPONIN QUANT: CPT

## 2020-06-23 PROCEDURE — 99282 EMERGENCY DEPT VISIT SF MDM: CPT

## 2020-06-23 PROCEDURE — 96374 THER/PROPH/DIAG INJ IV PUSH: CPT

## 2020-06-23 PROCEDURE — 96372 THER/PROPH/DIAG INJ SC/IM: CPT

## 2020-06-23 PROCEDURE — 74011000250 HC RX REV CODE- 250: Performed by: EMERGENCY MEDICINE

## 2020-06-23 PROCEDURE — 93005 ELECTROCARDIOGRAM TRACING: CPT

## 2020-06-23 PROCEDURE — 99284 EMERGENCY DEPT VISIT MOD MDM: CPT

## 2020-06-23 PROCEDURE — 74011250636 HC RX REV CODE- 250/636: Performed by: EMERGENCY MEDICINE

## 2020-06-23 PROCEDURE — 94640 AIRWAY INHALATION TREATMENT: CPT

## 2020-06-23 RX ORDER — ALPRAZOLAM 0.25 MG/1
0.5 TABLET ORAL
Status: COMPLETED | OUTPATIENT
Start: 2020-06-23 | End: 2020-06-23

## 2020-06-23 RX ORDER — ALBUTEROL SULFATE 0.83 MG/ML
2.5 SOLUTION RESPIRATORY (INHALATION)
Status: COMPLETED | OUTPATIENT
Start: 2020-06-23 | End: 2020-06-23

## 2020-06-23 RX ORDER — CLONIDINE HYDROCHLORIDE 0.1 MG/1
0.2 TABLET ORAL
Status: COMPLETED | OUTPATIENT
Start: 2020-06-23 | End: 2020-06-23

## 2020-06-23 RX ORDER — DICYCLOMINE HYDROCHLORIDE 10 MG/ML
20 INJECTION INTRAMUSCULAR
Status: DISCONTINUED | OUTPATIENT
Start: 2020-06-23 | End: 2020-06-23 | Stop reason: HOSPADM

## 2020-06-23 RX ORDER — LORAZEPAM 1 MG/1
1 TABLET ORAL
Status: COMPLETED | OUTPATIENT
Start: 2020-06-23 | End: 2020-06-23

## 2020-06-23 RX ORDER — PANTOPRAZOLE SODIUM 40 MG/10ML
40 INJECTION, POWDER, LYOPHILIZED, FOR SOLUTION INTRAVENOUS
Status: COMPLETED | OUTPATIENT
Start: 2020-06-23 | End: 2020-06-23

## 2020-06-23 RX ADMIN — DICYCLOMINE HYDROCHLORIDE 20 MG: 10 INJECTION, SOLUTION INTRAMUSCULAR at 06:50

## 2020-06-23 RX ADMIN — ALPRAZOLAM 0.5 MG: 0.25 TABLET ORAL at 11:02

## 2020-06-23 RX ADMIN — DICYCLOMINE HYDROCHLORIDE 20 MG: 10 INJECTION, SOLUTION INTRAMUSCULAR at 11:02

## 2020-06-23 RX ADMIN — ALBUTEROL SULFATE 2.5 MG: 2.5 SOLUTION RESPIRATORY (INHALATION) at 17:35

## 2020-06-23 RX ADMIN — PANTOPRAZOLE SODIUM 40 MG: 40 INJECTION, POWDER, FOR SOLUTION INTRAVENOUS at 05:52

## 2020-06-23 RX ADMIN — LORAZEPAM 1 MG: 1 TABLET ORAL at 17:35

## 2020-06-23 RX ADMIN — CLONIDINE HYDROCHLORIDE 0.2 MG: 0.1 TABLET ORAL at 06:05

## 2020-06-23 NOTE — PROGRESS NOTES
Date/Time:  6/23/2020 1:25 PM  Attempted to reach patient by telephone. Left HIPPA compliant message requesting a return call. Will attempt to reach patient again.

## 2020-06-23 NOTE — ED NOTES
7 AM, the patient was signed out to me by Dr. Yobany Mauricio    [phone] patient UA negative. Patient reevaluation normal.  Patient does seem to be seeking medications. Patient chronically on Xanax and says she lost her prescription. Her will not refill this prescription per the local policy for loss controlled substances.
Fever

## 2020-06-23 NOTE — ED PROVIDER NOTES
EMERGENCY DEPARTMENT HISTORY AND PHYSICAL EXAM    9:33 PM      Date: 2020  Patient Name: Katy Venegas    History of Presenting Illness     Chief Complaint   Patient presents with    Chest Pain         History Provided By: Patient  Location/Duration/Severity/Modifying factors   Patient is a 58-year-old female with a history of asthma, hypertension, opiate use with methadone use in the past, noncompliance, congestive heart failure, and thyroid disease the presents emergency department complaint of 4 days of increasing shortness of breath. Patient states that she is lost her medications including her blood pressure medications and inhalers approximately 4 days ago after she was locked out of her house. Patient notes she had lost her brother, mother the past 2 weeks and was at her  earlier in the week. Patient's been having intermittent shortness of breath and anxiety especially when she tries to lay down. Patient notes that she is on clonidine and lisinopril for her blood pressure control however has not been able to consistently get her medications in quite some time. Patient goes to the [facility] for care however with everything going on her life she has not been able to follow-up as she would like to. Patient had some alcohol at the  celebrating her family's life however such as not a regular drinker and she is a smoker. Patient was a previous heroin user. Patient notes that she has to lay on her side to sleep and she is short of breath with any exertion. Patient denies any nausea, vomiting, or diaphoresis but does have intermittent chest pressure.           PCP: Crispin Lozoya NP    Current Facility-Administered Medications   Medication Dose Route Frequency Provider Last Rate Last Dose    albuterol-ipratropium (DUO-NEB) 2.5 MG-0.5 MG/3 ML  3 mL Nebulization NOW Santos Hernandez MD        amLODIPine (NORVASC) tablet 10 mg  10 mg Oral NOW Santos Hernandez MD  metoprolol tartrate (LOPRESSOR) tablet 25 mg  25 mg Oral NOW Vicenta Donahue MD         Current Outpatient Medications   Medication Sig Dispense Refill    ALPRAZolam (Xanax) 0.5 mg tablet Take 1 Tab by mouth every eight (8) hours as needed for Anxiety. Max Daily Amount: 1.5 mg. 20 Tab 0    albuterol (PROVENTIL HFA, VENTOLIN HFA, PROAIR HFA) 90 mcg/actuation inhaler Take 2 Puffs by inhalation every four (4) hours as needed for Wheezing. 1 Inhaler 0    albuterol (PROVENTIL HFA, VENTOLIN HFA, PROAIR HFA) 90 mcg/actuation inhaler Take 2 Puffs by inhalation every four (4) hours as needed for Wheezing. 1 Inhaler 0    cloNIDine HCL (CATAPRES) 0.2 mg tablet Take 1 Tab by mouth two (2) times a day. 60 Tab 1    aspirin delayed-release 81 mg tablet Take 1 Tab by mouth daily. 30 Tab 0    ondansetron (ZOFRAN ODT) 4 mg disintegrating tablet Take 1 Tab by mouth every eight (8) hours as needed for Nausea. Indications: prevent nausea and vomiting after surgery 20 Tab 0    famotidine (PEPCID) 20 mg tablet Take 1 Tab by mouth daily. 30 Tab 1    OXYGEN-AIR DELIVERY SYSTEMS 3 L by IntraNASal route as needed for Other (sob).  docusate sodium (COLACE) 100 mg capsule Take 1 Cap by mouth two (2) times a day. 120 Cap 0    fluticasone furoate-vilanterol (BREO ELLIPTA) 200-25 mcg/dose inhaler Take 1 Puff by inhalation daily.  amLODIPine (NORVASC) 10 mg tablet Take 1 Tab by mouth daily. 30 Tab 0    arformoterol (BROVANA) 15 mcg/2 mL nebu neb solution 2 mL by Nebulization route two (2) times a day. 60 Vial 0    budesonide (PULMICORT) 0.5 mg/2 mL nbsp 2 mL by Nebulization route two (2) times a day. 60 Each 0    tiotropium (SPIRIVA) 18 mcg inhalation capsule Take 1 Cap by inhalation daily. 30 Cap 0    atorvastatin (LIPITOR) 20 mg tablet Take 1 Tab by mouth nightly. 30 Tab 0    escitalopram oxalate (LEXAPRO) 10 mg tablet Take 1 Tab by mouth every evening.  30 Tab 0    metoprolol tartrate (LOPRESSOR) 25 mg tablet Take 0.5 Tabs by mouth every twelve (12) hours. 30 Tab 0    roflumilast (DALIRESP) 500 mcg tab tablet Take 1 Tab by mouth daily. 30 Tab 0    OTHER Incentive spirometry- use as directed 1 Each 0    naloxone (NARCAN) 4 mg/actuation nasal spray Use 1 spray intranasally, then discard. Repeat with new spray every 2 min as needed for opioid overdose symptoms, alternating nostrils. 1 Each 0    montelukast (SINGULAIR) 10 mg tablet Take 1 Tab by mouth nightly. 30 Tab 1       Past History     Past Medical History:  Past Medical History:   Diagnosis Date    Asthma     CHF (congestive heart failure) (HCC)     Chronic obstructive pulmonary disease (HCC)     Dependence on supplemental oxygen     Endocrine disease     thyroid issues    Gastrointestinal disorder     \"blockage in my stomach\"    Hypercholesterolemia     Hypertension        Past Surgical History:  Past Surgical History:   Procedure Laterality Date    HX GI      Exploratory laparotomy with lysis of adhesions and primary repair of incarcerated umbilical hernia       Family History:  No family history on file. Social History:  Social History     Tobacco Use    Smoking status: Current Every Day Smoker     Packs/day: 0.25    Smokeless tobacco: Former User   Substance Use Topics    Alcohol use: No     Comment: socially    Drug use: Not Currently     Types: Heroin       Allergies:  No Known Allergies      Review of Systems       Review of Systems   Constitutional: Negative for activity change, fatigue and fever. HENT: Negative for congestion and rhinorrhea. Eyes: Negative for visual disturbance. Respiratory: Positive for chest tightness and shortness of breath. Cardiovascular: Positive for chest pain. Negative for palpitations. Gastrointestinal: Negative for abdominal pain, diarrhea, nausea and vomiting. Genitourinary: Negative for dysuria and hematuria. Musculoskeletal: Negative for back pain. Skin: Negative for rash.    Neurological: Negative for dizziness, weakness and light-headedness. Psychiatric/Behavioral: Positive for sleep disturbance. The patient is nervous/anxious. All other systems reviewed and are negative. Physical Exam     Visit Vitals  BP (!) 208/99   Pulse 92   Resp 16   LMP 04/14/2009   SpO2 96%         Physical Exam  Vitals signs and nursing note reviewed. Constitutional:       General: She is not in acute distress. Appearance: She is well-developed. HENT:      Head: Normocephalic and atraumatic. Right Ear: External ear normal.      Left Ear: External ear normal.      Nose: Nose normal.   Eyes:      General: No scleral icterus. Conjunctiva/sclera: Conjunctivae normal.      Pupils: Pupils are equal, round, and reactive to light. Neck:      Musculoskeletal: Normal range of motion and neck supple. Thyroid: No thyromegaly. Vascular: JVD present. Trachea: No tracheal deviation. Cardiovascular:      Rate and Rhythm: Normal rate and regular rhythm. Heart sounds: Normal heart sounds. No murmur. No friction rub. No gallop. Pulmonary:      Effort: Pulmonary effort is normal.      Breath sounds: Examination of the right-middle field reveals wheezing. Examination of the left-middle field reveals wheezing. Examination of the right-lower field reveals decreased breath sounds and wheezing. Examination of the left-lower field reveals decreased breath sounds and wheezing. Decreased breath sounds, wheezing and rhonchi present. Chest:      Chest wall: No tenderness. Abdominal:      General: Bowel sounds are normal. There is no distension. Palpations: Abdomen is soft. Tenderness: There is no abdominal tenderness. There is no guarding or rebound. Musculoskeletal: Normal range of motion. General: No tenderness. Lymphadenopathy:      Cervical: No cervical adenopathy. Skin:     General: Skin is warm and dry.    Neurological:      Mental Status: She is alert and oriented to person, place, and time. Cranial Nerves: No cranial nerve deficit. Coordination: Coordination normal.      Comments: No sensory loss, Gait normal, Motor 5/5   Psychiatric:         Behavior: Behavior normal.         Thought Content: Thought content normal.         Judgment: Judgment normal.           Diagnostic Study Results     Labs -  Recent Results (from the past 12 hour(s))   CBC WITH AUTOMATED DIFF    Collection Time: 06/22/20  7:00 PM   Result Value Ref Range    WBC 8.6 4.6 - 13.2 K/uL    RBC 5.28 4.20 - 5.30 M/uL    HGB 15.4 12.0 - 16.0 g/dL    HCT 46.3 (H) 35.0 - 45.0 %    MCV 87.7 74.0 - 97.0 FL    MCH 29.2 24.0 - 34.0 PG    MCHC 33.3 31.0 - 37.0 g/dL    RDW 12.3 11.6 - 14.5 %    PLATELET 025 639 - 536 K/uL    MPV 11.1 9.2 - 11.8 FL    NEUTROPHILS 66 40 - 73 %    LYMPHOCYTES 27 21 - 52 %    MONOCYTES 6 3 - 10 %    EOSINOPHILS 1 0 - 5 %    BASOPHILS 0 0 - 2 %    ABS. NEUTROPHILS 5.6 1.8 - 8.0 K/UL    ABS. LYMPHOCYTES 2.3 0.9 - 3.6 K/UL    ABS. MONOCYTES 0.6 0.05 - 1.2 K/UL    ABS. EOSINOPHILS 0.1 0.0 - 0.4 K/UL    ABS. BASOPHILS 0.0 0.0 - 0.1 K/UL    DF AUTOMATED     METABOLIC PANEL, COMPREHENSIVE    Collection Time: 06/22/20  7:00 PM   Result Value Ref Range    Sodium 142 136 - 145 mmol/L    Potassium 3.3 (L) 3.5 - 5.5 mmol/L    Chloride 106 100 - 111 mmol/L    CO2 32 21 - 32 mmol/L    Anion gap 4 3.0 - 18 mmol/L    Glucose 119 (H) 74 - 99 mg/dL    BUN 21 (H) 7.0 - 18 MG/DL    Creatinine 1.41 (H) 0.6 - 1.3 MG/DL    BUN/Creatinine ratio 15 12 - 20      GFR est AA 47 (L) >60 ml/min/1.73m2    GFR est non-AA 39 (L) >60 ml/min/1.73m2    Calcium 9.9 8.5 - 10.1 MG/DL    Bilirubin, total 0.6 0.2 - 1.0 MG/DL    ALT (SGPT) 20 13 - 56 U/L    AST (SGOT) 10 10 - 38 U/L    Alk.  phosphatase 91 45 - 117 U/L    Protein, total 8.1 6.4 - 8.2 g/dL    Albumin 4.1 3.4 - 5.0 g/dL    Globulin 4.0 2.0 - 4.0 g/dL    A-G Ratio 1.0 0.8 - 1.7     MAGNESIUM    Collection Time: 06/22/20  7:00 PM   Result Value Ref Range Magnesium 2.1 1.6 - 2.6 mg/dL   NT-PRO BNP    Collection Time: 06/22/20  7:00 PM   Result Value Ref Range    NT pro-BNP 4,968 (H) 0 - 900 PG/ML   CARDIAC PANEL,(CK, CKMB & TROPONIN)    Collection Time: 06/22/20  7:00 PM   Result Value Ref Range    CK - MB 1.2 <3.6 ng/ml    CK-MB Index 2.1 0.0 - 4.0 %    CK 57 26 - 192 U/L    Troponin-I, QT <0.02 0.0 - 0.045 NG/ML   PROTHROMBIN TIME + INR    Collection Time: 06/22/20  7:00 PM   Result Value Ref Range    Prothrombin time 13.3 11.5 - 15.2 sec    INR 1.0 0.8 - 1.2     PTT    Collection Time: 06/22/20  7:00 PM   Result Value Ref Range    aPTT 27.8 23.0 - 36.4 SEC   EKG, 12 LEAD, INITIAL    Collection Time: 06/22/20  7:10 PM   Result Value Ref Range    Ventricular Rate 86 BPM    Atrial Rate 86 BPM    P-R Interval 130 ms    QRS Duration 128 ms    Q-T Interval 420 ms    QTC Calculation (Bezet) 502 ms    Calculated P Axis 56 degrees    Calculated R Axis -44 degrees    Calculated T Axis 89 degrees    Diagnosis       Normal sinus rhythm  Possible Left atrial enlargement  Left axis deviation  Left bundle branch block  Abnormal ECG  When compared with ECG of 16-JUN-2020 07:15,  No significant change was found         Radiologic Studies -   XR CHEST SNGL V    (Results Pending)     Cardiomegaly with mild vascular congestion, interpreted by me    Medical Decision Making   I am the first provider for this patient. I reviewed the vital signs, available nursing notes, past medical history, past surgical history, family history and social history. Vital Signs-Reviewed the patient's vital signs. EKG: Paced rhythm rate 86, left bundle branch block, no change from previous, no STEMI    Records Reviewed: Nursing Notes and Old Medical Records (Time of Review: 9:33 PM)    ED Course: Progress Notes, Reevaluation, and Consults:     Patient is slightly improved and will continue to follow her blood pressure. Will sign out to Dr. Bishnu Moreno to follow her BP and repeat troponin.  Adalberto Stevens DO [place] 9:41 PM      Provider Notes (Medical Decision Making):   MDM  Number of Diagnoses or Management Options  Diagnosis management comments: Patient is a 58-year-old female with a history of congestive heart failure, hypertension, COPD, substance abuse, noncompliance, the presents emergency department complaint of shortness of breath for last 4 days since she is not been able to take her medications. Patient's blood pressure is elevated as well as her renal functions trending upwards with an elevated BNP and concern for congestive heart failure. Patient is wheezing and suspect this could be multifactorial and will give DuoNeb, steroids, Lasix, nitroglycerin, give her blood pressure medications and then reevaluate. The patient notes that she is taking lisinopril and clonidine as her blood pressure medications however looking at her last discharge summary in April 2020 she should be on Norvasc, metoprolol, and clonidine. We will give her those medications hold the lisinopril given her rising renal function and reevaluate. Procedures        Diagnosis     Clinical Impression:   1. SOB (shortness of breath)        Disposition: Pending     Follow-up Information    None          Patient's Medications   Start Taking    No medications on file   Continue Taking    ALBUTEROL (PROVENTIL HFA, VENTOLIN HFA, PROAIR HFA) 90 MCG/ACTUATION INHALER    Take 2 Puffs by inhalation every four (4) hours as needed for Wheezing. ALBUTEROL (PROVENTIL HFA, VENTOLIN HFA, PROAIR HFA) 90 MCG/ACTUATION INHALER    Take 2 Puffs by inhalation every four (4) hours as needed for Wheezing. ALPRAZOLAM (XANAX) 0.5 MG TABLET    Take 1 Tab by mouth every eight (8) hours as needed for Anxiety. Max Daily Amount: 1.5 mg.    AMLODIPINE (NORVASC) 10 MG TABLET    Take 1 Tab by mouth daily. ARFORMOTEROL (BROVANA) 15 MCG/2 ML NEBU NEB SOLUTION    2 mL by Nebulization route two (2) times a day.     ASPIRIN DELAYED-RELEASE 81 MG TABLET Take 1 Tab by mouth daily. ATORVASTATIN (LIPITOR) 20 MG TABLET    Take 1 Tab by mouth nightly. BUDESONIDE (PULMICORT) 0.5 MG/2 ML NBSP    2 mL by Nebulization route two (2) times a day. CLONIDINE HCL (CATAPRES) 0.2 MG TABLET    Take 1 Tab by mouth two (2) times a day. DOCUSATE SODIUM (COLACE) 100 MG CAPSULE    Take 1 Cap by mouth two (2) times a day. ESCITALOPRAM OXALATE (LEXAPRO) 10 MG TABLET    Take 1 Tab by mouth every evening. FAMOTIDINE (PEPCID) 20 MG TABLET    Take 1 Tab by mouth daily. FLUTICASONE FUROATE-VILANTEROL (BREO ELLIPTA) 200-25 MCG/DOSE INHALER    Take 1 Puff by inhalation daily. METOPROLOL TARTRATE (LOPRESSOR) 25 MG TABLET    Take 0.5 Tabs by mouth every twelve (12) hours. MONTELUKAST (SINGULAIR) 10 MG TABLET    Take 1 Tab by mouth nightly. NALOXONE (NARCAN) 4 MG/ACTUATION NASAL SPRAY    Use 1 spray intranasally, then discard. Repeat with new spray every 2 min as needed for opioid overdose symptoms, alternating nostrils. ONDANSETRON (ZOFRAN ODT) 4 MG DISINTEGRATING TABLET    Take 1 Tab by mouth every eight (8) hours as needed for Nausea. Indications: prevent nausea and vomiting after surgery    OTHER    Incentive spirometry- use as directed    OXYGEN-AIR DELIVERY SYSTEMS    3 L by IntraNASal route as needed for Other (sob). ROFLUMILAST (DALIRESP) 500 MCG TAB TABLET    Take 1 Tab by mouth daily. TIOTROPIUM (SPIRIVA) 18 MCG INHALATION CAPSULE    Take 1 Cap by inhalation daily. These Medications have changed    No medications on file   Stop Taking    No medications on file     Disclaimer: Sections of this note are dictated using utilizing voice recognition software. Minor typographical errors may be present. If questions arise, please do not hesitate to contact me or call our department.

## 2020-06-23 NOTE — ED PROVIDER NOTES
EMERGENCY DEPARTMENT HISTORY AND PHYSICAL EXAM    3:49 PM      Date: 6/23/2020  Patient Name: Ganga Healy    History of Presenting Illness     Chief Complaint   Patient presents with    Abdominal Pain         History Provided By: Patient    Additional History (Context): Ganga Healy is a 53 y.o. female with hypertension, hyperlipidemia, COPD and Congestive heart failure who presents with chest pain and shortness of breath. Patient and was just released from the emergency room this morning she states she is he is having some problems breathing. Cough, nausea, vomiting or diarrhea. Patient has multiple deaths in her family this year. Patient is highly anxious. Patient does smoke cigarettes. Denies any current recreational drug use. Admits to social alcohol use. PCP: Eduardo Casey NP      Current Facility-Administered Medications   Medication Dose Route Frequency Provider Last Rate Last Dose    LORazepam (ATIVAN) tablet 1 mg  1 mg Oral NOW Paul Peres DO        albuterol (PROVENTIL VENTOLIN) nebulizer solution 2.5 mg  2.5 mg Nebulization NOW Lucas Peres, DO         Current Outpatient Medications   Medication Sig Dispense Refill    cloNIDine HCL (CATAPRES) 0.2 mg tablet Take 1 Tab by mouth two (2) times a day. 30 Tab 1    albuterol (PROVENTIL HFA, VENTOLIN HFA, PROAIR HFA) 90 mcg/actuation inhaler Take 2 Puffs by inhalation every four (4) hours as needed for Wheezing. 1 Inhaler 0    amLODIPine (NORVASC) 10 mg tablet Take 1 Tab by mouth daily. 15 Tab 0    ALPRAZolam (Xanax) 0.5 mg tablet Take 1 Tab by mouth every eight (8) hours as needed for Anxiety. Max Daily Amount: 1.5 mg. 20 Tab 0    albuterol (PROVENTIL HFA, VENTOLIN HFA, PROAIR HFA) 90 mcg/actuation inhaler Take 2 Puffs by inhalation every four (4) hours as needed for Wheezing. 1 Inhaler 0    aspirin delayed-release 81 mg tablet Take 1 Tab by mouth daily.  30 Tab 0    ondansetron (ZOFRAN ODT) 4 mg disintegrating tablet Take 1 Tab by mouth every eight (8) hours as needed for Nausea. Indications: prevent nausea and vomiting after surgery 20 Tab 0    famotidine (PEPCID) 20 mg tablet Take 1 Tab by mouth daily. 30 Tab 1    OXYGEN-AIR DELIVERY SYSTEMS 3 L by IntraNASal route as needed for Other (sob).  docusate sodium (COLACE) 100 mg capsule Take 1 Cap by mouth two (2) times a day. 120 Cap 0    fluticasone furoate-vilanterol (BREO ELLIPTA) 200-25 mcg/dose inhaler Take 1 Puff by inhalation daily.  arformoterol (BROVANA) 15 mcg/2 mL nebu neb solution 2 mL by Nebulization route two (2) times a day. 60 Vial 0    budesonide (PULMICORT) 0.5 mg/2 mL nbsp 2 mL by Nebulization route two (2) times a day. 60 Each 0    tiotropium (SPIRIVA) 18 mcg inhalation capsule Take 1 Cap by inhalation daily. 30 Cap 0    atorvastatin (LIPITOR) 20 mg tablet Take 1 Tab by mouth nightly. 30 Tab 0    escitalopram oxalate (LEXAPRO) 10 mg tablet Take 1 Tab by mouth every evening. 30 Tab 0    metoprolol tartrate (LOPRESSOR) 25 mg tablet Take 0.5 Tabs by mouth every twelve (12) hours. 30 Tab 0    roflumilast (DALIRESP) 500 mcg tab tablet Take 1 Tab by mouth daily. 30 Tab 0    OTHER Incentive spirometry- use as directed 1 Each 0    naloxone (NARCAN) 4 mg/actuation nasal spray Use 1 spray intranasally, then discard. Repeat with new spray every 2 min as needed for opioid overdose symptoms, alternating nostrils. 1 Each 0    montelukast (SINGULAIR) 10 mg tablet Take 1 Tab by mouth nightly.  30 Tab 1       Past History     Past Medical History:  Past Medical History:   Diagnosis Date    Asthma     CHF (congestive heart failure) (HCC)     Chronic obstructive pulmonary disease (HCC)     Dependence on supplemental oxygen     Endocrine disease     thyroid issues    Gastrointestinal disorder     \"blockage in my stomach\"    Hypercholesterolemia     Hypertension        Past Surgical History:  Past Surgical History:   Procedure Laterality Date    HX GI Exploratory laparotomy with lysis of adhesions and primary repair of incarcerated umbilical hernia       Family History:  No family history on file. Social History:  Social History     Tobacco Use    Smoking status: Current Every Day Smoker     Packs/day: 0.25    Smokeless tobacco: Former User   Substance Use Topics    Alcohol use: No     Comment: socially    Drug use: Not Currently     Types: Heroin       Allergies:  No Known Allergies      Review of Systems       Review of Systems   Constitutional: Negative. Negative for chills, diaphoresis and fever. HENT: Negative. Negative for congestion, rhinorrhea and sore throat. Eyes: Negative. Negative for pain, discharge and redness. Respiratory: Negative. Negative for cough, chest tightness, shortness of breath and wheezing. Cardiovascular: Negative. Negative for chest pain. Gastrointestinal: Negative. Negative for abdominal pain, constipation, diarrhea, nausea and vomiting. Genitourinary: Negative. Negative for dysuria, flank pain, frequency, hematuria and urgency. Musculoskeletal: Negative. Negative for back pain and neck pain. Skin: Negative. Negative for rash. Neurological: Negative. Negative for syncope, weakness, numbness and headaches. Psychiatric/Behavioral: Negative. All other systems reviewed and are negative. Physical Exam     Visit Vitals  /87 (BP 1 Location: Right arm, BP Patient Position: At rest;Sitting)   Pulse 96   Temp 97.4 °F (36.3 °C)   Resp 20   Ht 4' 11\" (1.499 m)   Wt 70.3 kg (155 lb)   LMP 04/14/2009   SpO2 98%   BMI 31.31 kg/m²         Physical Exam  Vitals signs and nursing note reviewed. Constitutional:       General: She is not in acute distress. Appearance: Normal appearance. She is well-developed. She is not ill-appearing, toxic-appearing or diaphoretic. HENT:      Head: Normocephalic and atraumatic. Mouth/Throat:      Pharynx: No oropharyngeal exudate.    Eyes:      General: No scleral icterus. Conjunctiva/sclera: Conjunctivae normal.      Pupils: Pupils are equal, round, and reactive to light. Neck:      Musculoskeletal: Normal range of motion and neck supple. Thyroid: No thyromegaly. Vascular: No hepatojugular reflux or JVD. Trachea: No tracheal deviation. Cardiovascular:      Rate and Rhythm: Normal rate and regular rhythm. Pulses: Normal pulses. Radial pulses are 2+ on the right side and 2+ on the left side. Dorsalis pedis pulses are 2+ on the right side and 2+ on the left side. Heart sounds: Normal heart sounds, S1 normal and S2 normal. No murmur. No gallop. No S3 or S4 sounds. Pulmonary:      Effort: Pulmonary effort is normal. No respiratory distress. Breath sounds: Normal breath sounds. No decreased breath sounds, wheezing, rhonchi or rales. Chest:       Abdominal:      General: Bowel sounds are normal. There is no distension. Palpations: Abdomen is soft. Abdomen is not rigid. There is no mass. Tenderness: There is no abdominal tenderness. There is no guarding or rebound. Negative signs include Vasquez's sign and McBurney's sign. Musculoskeletal: Normal range of motion. Comments: Strength is 5 out of 5 throughout. Lymphadenopathy:      Head:      Right side of head: No submental, submandibular, preauricular or occipital adenopathy. Left side of head: No submental, submandibular, preauricular or occipital adenopathy. Cervical: No cervical adenopathy. Upper Body:      Right upper body: No supraclavicular adenopathy. Left upper body: No supraclavicular adenopathy. Skin:     General: Skin is warm and dry. Findings: No rash. Neurological:      Mental Status: She is alert. She is not disoriented. GCS: GCS eye subscore is 4. GCS verbal subscore is 5. GCS motor subscore is 6. Cranial Nerves: No cranial nerve deficit. Sensory: No sensory deficit.       Coordination: Coordination normal.      Gait: Gait normal.      Deep Tendon Reflexes: Reflexes are normal and symmetric. Comments: Grossly intact. Psychiatric:         Speech: Speech normal.         Behavior: Behavior normal.         Thought Content:  Thought content normal.         Judgment: Judgment normal.           Diagnostic Study Results     Labs -  Recent Results (from the past 12 hour(s))   EKG, 12 LEAD, INITIAL    Collection Time: 06/23/20  5:36 AM   Result Value Ref Range    Ventricular Rate 76 BPM    Atrial Rate 76 BPM    P-R Interval 140 ms    QRS Duration 124 ms    Q-T Interval 456 ms    QTC Calculation (Bezet) 513 ms    Calculated P Axis 73 degrees    Calculated R Axis -41 degrees    Calculated T Axis -125 degrees    Diagnosis       Normal sinus rhythm  Possible Left atrial enlargement  Left axis deviation  Left bundle branch block  Abnormal ECG  Confirmed by Anthony Rader MD, Raoul Russ ([id number]) on 6/23/2020 9:02:48 AM     TROPONIN I    Collection Time: 06/23/20  5:44 AM   Result Value Ref Range    Troponin-I, QT <0.02 0.0 - 0.045 NG/ML   URINALYSIS W/ RFLX MICROSCOPIC    Collection Time: 06/23/20  9:19 AM   Result Value Ref Range    Color YELLOW      Appearance CLOUDY      Specific gravity 1.021 1.005 - 1.030      pH (UA) 5.0 5.0 - 8.0      Protein TRACE (A) NEG mg/dL    Glucose Negative NEG mg/dL    Ketone Negative NEG mg/dL    Bilirubin Negative NEG      Blood Negative NEG      Urobilinogen 1.0 0.2 - 1.0 EU/dL    Nitrites Negative NEG      Leukocyte Esterase TRACE (A) NEG     URINE MICROSCOPIC ONLY    Collection Time: 06/23/20  9:19 AM   Result Value Ref Range    WBC 0 to 3 0 - 4 /hpf    RBC 7 to 10 0 - 5 /hpf    Epithelial cells 2+ 0 - 5 /lpf    Bacteria 2+ (A) NEG /hpf   EKG, 12 LEAD, INITIAL    Collection Time: 06/23/20  4:03 PM   Result Value Ref Range    Ventricular Rate 73 BPM    Atrial Rate 73 BPM    P-R Interval 136 ms    QRS Duration 120 ms    Q-T Interval 462 ms    QTC Calculation (Bezet) 508 ms    Calculated P Axis 68 degrees    Calculated R Axis -23 degrees    Calculated T Axis 119 degrees    Diagnosis       Normal sinus rhythm  Left bundle branch block  Abnormal ECG  Confirmed by Felicita Burks MD, Bora Us ([id number]) on 6/23/2020 5:25:52 PM         Radiologic Studies -   No orders to display         Medical Decision Making   Provider Notes (Medical Decision Making):  MDM  Number of Diagnoses or Management Options  Diagnosis management comments: DIFFERENTIAL DIAGNOSES/ MEDICAL DECISION MAKING:  Chest pain etiologies include acute cardiac events to include possible acute myocardial infarction, acute coronary syndrome, pneumonia, chest wall pain (myofascial/ musculoskeletal etiology), chronic obstructive pulmonary disease (copd), acute asthma exacerbation, congestive heart failure, acute bronchitis, pulmonary embolism, upper respiratory infection, referred abdominal pain, other etiologies, versus combination of the above. I am the first provider for this patient. I reviewed the vital signs, available nursing notes, past medical history, past surgical history, family history and social history. Vital Signs-Reviewed the patient's vital signs. Records Reviewed: Nursing Notes (Time of Review: 3:49 PM)    ED Course: Progress Notes, Reevaluation, and Consults:    EKG showed NSR with a left bundle branch block at a rate of 73. Patient has ST elevation on V3 different morphology than in the past and non specific T wave changes. 3:49 PM 6/23/2020    Consult:  Discussed care with DARCIE Bonner cardiology. Standard discussion; including history of patients chief complaint, available diagnostic results, and treatment course. We will see EKG and pass along to Dr. Meri Enamorado. 4:30 PM    Discussed with Dr. Mei Evans. Patient states cardiac enzymes flat and EKG is not concerning. Okay to discharge. Patient given albuterol treatment and 1 mg of Ativan. Diagnosis       I have reassessed the patient. Patient is feeling better. Patient was discharged in stable condition. Patient is to return to emergency department if any new or worsening condition. Clinical Impression:   1. Chest pain, unspecified type    2. Anxiety state        Disposition: Discharged home     Follow-up Information     Follow up With Specialties Details Why Contact Info    Izaiah Timmons NP Nurse Practitioner In 2 days  [id number] [facility] [address] [phone]               Attestation        Provider Attestation:     I personally performed the services described in the documentation, reviewed the documentation and it accurately and completely records my words and actions utilizing the [address] June 23, 2020 at 5:27 PM - [place], [address]. It is dictated using utilizing voice recognition software. Unfortunately this leads to occasional typographical errors. I apologize in advance if the situation occurs. If questions arise please do not hesitate to contact me or call our department.

## 2020-06-23 NOTE — BSMART NOTE
Patient requesting grieving resources. Discussed with patient outpatient therapy and provided written information. Patient advises she plans to follow up with outpatient provider.

## 2020-06-23 NOTE — DISCHARGE INSTRUCTIONS

## 2020-06-23 NOTE — DISCHARGE INSTRUCTIONS
Patient Education        Chest Pain: Care Instructions  Your Care Instructions     There are many things that can cause chest pain. Some are not serious and will get better on their own in a few days. But some kinds of chest pain need more testing and treatment. Your doctor may have recommended a follow-up visit in the next 8 to 12 hours. If you are not getting better, you may need more tests or treatment. Even though your doctor has released you, you still need to watch for any problems. The doctor carefully checked you, but sometimes problems can develop later. If you have new symptoms or if your symptoms do not get better, get medical care right away. If you have worse or different chest pain or pressure that lasts more than 5 minutes or you passed out (lost consciousness), [id number] or seek other emergency help right away. A medical visit is only one step in your treatment. Even if you feel better, you still need to do what your doctor recommends, such as going to all suggested follow-up appointments and taking medicines exactly as directed. This will help you recover and help prevent future problems. How can you care for yourself at home? · Rest until you feel better. · Take your medicine exactly as prescribed. Call your doctor if you think you are having a problem with your medicine. · Do not drive after taking a prescription pain medicine. When should you call for help? [id number]:   · You passed out (lost consciousness). · You have severe difficulty breathing. · You have symptoms of a heart attack. These may include:  ? Chest pain or pressure, or a strange feeling in your chest.  ? Sweating. ? Shortness of breath. ? Nausea or vomiting. ? Pain, pressure, or a strange feeling in your back, neck, jaw, or upper belly or in one or both shoulders or arms. ? Lightheadedness or sudden weakness. ? A fast or irregular heartbeat.   After you call 911, the  may tell you to chew 1 adult-strength or 2 to 4 low-dose aspirin. Wait for an ambulance. Do not try to drive yourself. Call your doctor today if:   · You have any trouble breathing. · Your chest pain gets worse. · You are dizzy or lightheaded, or you feel like you may faint. · You are not getting better as expected. · You are having new or different chest pain. Where can you learn more? Go to http://magda-kadie.info/  Enter A120 in the search box to learn more about \"Chest Pain: Care Instructions. \"  Current as of: June 26, 2019               Content Version: 12.5  © 4651-7315 [employer]. Care instructions adapted under license by [employer] (which disclaims liability or warranty for this information). If you have questions about a medical condition or this instruction, always ask your healthcare professional. [place] [address] any warranty or liability for your use of this information. Patient Education        Anxiety Disorder: Care Instructions  Your Care Instructions     Anxiety is a normal reaction to stress. Difficult situations can cause you to have symptoms such as sweaty palms and a nervous feeling. In an anxiety disorder, the symptoms are far more severe. Constant worry, muscle tension, trouble sleeping, nausea and diarrhea, and other symptoms can make normal daily activities difficult or impossible. These symptoms may occur for no reason, and they can affect your work, school, or social life. Medicines, counseling, and self-care can all help. Follow-up care is a key part of your treatment and safety. Be sure to make and go to all appointments, and call your doctor if you are having problems. It's also a good idea to know your test results and keep a list of the medicines you take. How can you care for yourself at home? · Take medicines exactly as directed. Call your doctor if you think you are having a problem with your medicine.   · Go to your counseling sessions and follow-up appointments. · Recognize and accept your anxiety. Then, when you are in a situation that makes you anxious, say to yourself, \"This is not an emergency. I feel uncomfortable, but I am not in danger. I can keep going even if I feel anxious. \"  · Be kind to your body:  ? Relieve tension with exercise or a massage. ? Get enough rest.  ? Avoid alcohol, caffeine, nicotine, and illegal drugs. They can increase your anxiety level and cause sleep problems. ? Learn and do relaxation techniques. See below for more about these techniques. · Engage your mind. Get out and do something you enjoy. Go to a [employer] movie, or take a walk or hike. Plan your day. Having too much or too little to do can make you anxious. · Keep a record of your symptoms. Discuss your fears with a good friend or family member, or join a support group for people with similar problems. Talking to others sometimes relieves stress. · Get involved in social groups, or volunteer to help others. Being alone sometimes makes things seem worse than they are. · Get at least 30 minutes of exercise on most days of the week to relieve stress. Walking is a good choice. You also may want to do other activities, such as running, swimming, cycling, or playing tennis or team sports. Relaxation techniques  Do relaxation exercises 10 to 20 minutes a day. You can play soothing, relaxing music while you do them, if you wish. · Tell others in your house that you are going to do your relaxation exercises. Ask them not to disturb you. · Find a comfortable place, away from all distractions and noise. · Lie down on your back, or sit with your back straight. · Focus on your breathing. Make it slow and steady. · Breathe in through your nose. Breathe out through either your nose or mouth. · Breathe deeply, filling up the area between your navel and your rib cage. Breathe so that your belly goes up and down. · Do not hold your breath.   · Breathe like this for 5 to 10 minutes. Notice the feeling of calmness throughout your whole body. As you continue to breathe slowly and deeply, relax by doing the following for another 5 to 10 minutes:  · Tighten and relax each muscle group in your body. You can begin at your toes and work your way up to your head. · Imagine your muscle groups relaxing and becoming heavy. · Empty your mind of all thoughts. · Let yourself relax more and more deeply. · Become aware of the state of calmness that surrounds you. · When your relaxation time is over, you can bring yourself back to alertness by moving your fingers and toes and then your hands and feet and then stretching and moving your entire body. Sometimes people fall asleep during relaxation, but they usually wake up shortly afterward. · Always give yourself time to return to full alertness before you drive a car or do anything that might cause an accident if you are not fully alert. Never play a relaxation tape while you drive a car. When should you call for help? [id number] anytime you think you may need emergency care. For example, call if:  · You feel you cannot stop from hurting yourself or someone else. Keep the numbers for these national suicide hotlines: 7-709-778-TALK (4-511.211.8788) and 2-407-MVIXENA (7-416.592.7643). If you or someone you know talks about suicide or feeling hopeless, get help right away. Watch closely for changes in your health, and be sure to contact your doctor if:  · You have anxiety or fear that affects your life. · You have symptoms of anxiety that are new or different from those you had before. Where can you learn more? Go to http://magda-kadie.info/  Enter P754 in the search box to learn more about \"Anxiety Disorder: Care Instructions. \"  Current as of: January 31, 2020               Content Version: 12.5  © 9872-7251 [employer].    Care instructions adapted under license by [employer] (which disclaims liability or warranty for this information). If you have questions about a medical condition or this instruction, always ask your healthcare professional. [address] any warranty or liability for your use of this information.

## 2020-06-23 NOTE — ED NOTES
Assumed care for the patient pending reevaluation after diuresis. Patient reports that she is feeling much better and asked for some resources regarding grief. Crisis had seen her and provided her with resources. She was due for a repeat troponin but she is refusing to wait to get it done and reports that her ride is outside and she feels a lot better. Denies any chest pain. Will discharge.

## 2020-06-23 NOTE — DISCHARGE INSTRUCTIONS

## 2020-06-23 NOTE — ED PROVIDER NOTES
EMERGENCY DEPARTMENT HISTORY AND PHYSICAL EXAM    5:52 AM  Date: 6/23/2020  Patient Name: Ailin Jj    History of Presenting Illness     Chief Complaint   Patient presents with    Abdominal Pain        History Provided By: Patient    HPI: Ailin Jj is a 53 y.o. female with multiple medical problems as below. Patient was just discharged few hours ago after she was being seen for elevated blood pressure and fluid overload due to medication noncompliance. She is returning to the ER now with complaints of abdominal pain and urinary frequency. No history of nausea or vomiting. Abdominal pain seems to be chronic and she had frequent similar episodes in the past.  She was given diuretics prior to her discharge in the ER. No fever or chills. No chest pain. Location:  Severity:  Timing/course: Onset/Duration:     PCP: Althea Lee NP    Past History     Past Medical History:  Past Medical History:   Diagnosis Date    Asthma     CHF (congestive heart failure) (HCC)     Chronic obstructive pulmonary disease (HCC)     Dependence on supplemental oxygen     Endocrine disease     thyroid issues    Gastrointestinal disorder     \"blockage in my stomach\"    Hypercholesterolemia     Hypertension        Past Surgical History:  Past Surgical History:   Procedure Laterality Date    HX GI      Exploratory laparotomy with lysis of adhesions and primary repair of incarcerated umbilical hernia       Family History:  No family history on file. Social History:  Social History     Tobacco Use    Smoking status: Current Every Day Smoker     Packs/day: 0.25    Smokeless tobacco: Former User   Substance Use Topics    Alcohol use: No     Comment: socially    Drug use: Not Currently     Types: Heroin       Allergies:  No Known Allergies    Review of Systems   Review of Systems   Gastrointestinal: Positive for abdominal pain. Genitourinary: Positive for frequency.    All other systems reviewed and are negative. Physical Exam     No data found. Physical Exam  Vitals signs and nursing note reviewed. Constitutional:       Appearance: She is well-developed. HENT:      Head: Normocephalic and atraumatic. Eyes:      Extraocular Movements: Extraocular movements intact. Cardiovascular:      Rate and Rhythm: Normal rate. Pulmonary:      Effort: Pulmonary effort is normal. No respiratory distress. Abdominal:      General: There is no distension. Palpations: Abdomen is soft. Tenderness: There is generalized abdominal tenderness. Skin:     General: Skin is warm and dry. Neurological:      General: No focal deficit present. Mental Status: She is alert and oriented to person, place, and time. Psychiatric:         Mood and Affect: Mood normal.         Behavior: Behavior normal.         Diagnostic Study Results     Labs -  Recent Results (from the past 12 hour(s))   CBC WITH AUTOMATED DIFF    Collection Time: 06/22/20  7:00 PM   Result Value Ref Range    WBC 8.6 4.6 - 13.2 K/uL    RBC 5.28 4.20 - 5.30 M/uL    HGB 15.4 12.0 - 16.0 g/dL    HCT 46.3 (H) 35.0 - 45.0 %    MCV 87.7 74.0 - 97.0 FL    MCH 29.2 24.0 - 34.0 PG    MCHC 33.3 31.0 - 37.0 g/dL    RDW 12.3 11.6 - 14.5 %    PLATELET 822 174 - 962 K/uL    MPV 11.1 9.2 - 11.8 FL    NEUTROPHILS 66 40 - 73 %    LYMPHOCYTES 27 21 - 52 %    MONOCYTES 6 3 - 10 %    EOSINOPHILS 1 0 - 5 %    BASOPHILS 0 0 - 2 %    ABS. NEUTROPHILS 5.6 1.8 - 8.0 K/UL    ABS. LYMPHOCYTES 2.3 0.9 - 3.6 K/UL    ABS. MONOCYTES 0.6 0.05 - 1.2 K/UL    ABS. EOSINOPHILS 0.1 0.0 - 0.4 K/UL    ABS.  BASOPHILS 0.0 0.0 - 0.1 K/UL    DF AUTOMATED     METABOLIC PANEL, COMPREHENSIVE    Collection Time: 06/22/20  7:00 PM   Result Value Ref Range    Sodium 142 136 - 145 mmol/L    Potassium 3.3 (L) 3.5 - 5.5 mmol/L    Chloride 106 100 - 111 mmol/L    CO2 32 21 - 32 mmol/L    Anion gap 4 3.0 - 18 mmol/L    Glucose 119 (H) 74 - 99 mg/dL    BUN 21 (H) 7.0 - 18 MG/DL    Creatinine 1.41 (H) 0.6 - 1.3 MG/DL    BUN/Creatinine ratio 15 12 - 20      GFR est AA 47 (L) >60 ml/min/1.73m2    GFR est non-AA 39 (L) >60 ml/min/1.73m2    Calcium 9.9 8.5 - 10.1 MG/DL    Bilirubin, total 0.6 0.2 - 1.0 MG/DL    ALT (SGPT) 20 13 - 56 U/L    AST (SGOT) 10 10 - 38 U/L    Alk.  phosphatase 91 45 - 117 U/L    Protein, total 8.1 6.4 - 8.2 g/dL    Albumin 4.1 3.4 - 5.0 g/dL    Globulin 4.0 2.0 - 4.0 g/dL    A-G Ratio 1.0 0.8 - 1.7     MAGNESIUM    Collection Time: 06/22/20  7:00 PM   Result Value Ref Range    Magnesium 2.1 1.6 - 2.6 mg/dL   NT-PRO BNP    Collection Time: 06/22/20  7:00 PM   Result Value Ref Range    NT pro-BNP 4,968 (H) 0 - 900 PG/ML   CARDIAC PANEL,(CK, CKMB & TROPONIN)    Collection Time: 06/22/20  7:00 PM   Result Value Ref Range    CK - MB 1.2 <3.6 ng/ml    CK-MB Index 2.1 0.0 - 4.0 %    CK 57 26 - 192 U/L    Troponin-I, QT <0.02 0.0 - 0.045 NG/ML   PROTHROMBIN TIME + INR    Collection Time: 06/22/20  7:00 PM   Result Value Ref Range    Prothrombin time 13.3 11.5 - 15.2 sec    INR 1.0 0.8 - 1.2     PTT    Collection Time: 06/22/20  7:00 PM   Result Value Ref Range    aPTT 27.8 23.0 - 36.4 SEC   EKG, 12 LEAD, INITIAL    Collection Time: 06/22/20  7:10 PM   Result Value Ref Range    Ventricular Rate 86 BPM    Atrial Rate 86 BPM    P-R Interval 130 ms    QRS Duration 128 ms    Q-T Interval 420 ms    QTC Calculation (Bezet) 502 ms    Calculated P Axis 56 degrees    Calculated R Axis -44 degrees    Calculated T Axis 89 degrees    Diagnosis       Normal sinus rhythm  Possible Left atrial enlargement  Left axis deviation  Left bundle branch block  Abnormal ECG  When compared with ECG of 16-JUN-2020 07:15,  No significant change was found     EKG, 12 LEAD, INITIAL    Collection Time: 06/23/20  5:36 AM   Result Value Ref Range    Ventricular Rate 76 BPM    Atrial Rate 76 BPM    P-R Interval 140 ms    QRS Duration 124 ms    Q-T Interval 456 ms    QTC Calculation (Bezet) 513 ms    Calculated P Axis 73 degrees Calculated R Axis -41 degrees    Calculated T Axis -125 degrees    Diagnosis       Normal sinus rhythm  Possible Left atrial enlargement  Left axis deviation  Left bundle branch block  Abnormal ECG  When compared with ECG of 22-JUN-2020 19:10,  Non-specific change in ST segment in Inferior leads  T wave inversion now evident in Inferior leads  T wave inversion more evident in Lateral leads         Radiologic Studies -   No results found. Medical Decision Making     ED Course: Progress Notes, Reevaluation, and Consults:    5:52 AM Initial assessment performed. The patients presenting problems have been discussed, and they/their family are in agreement with the care plan formulated and outlined with them. I have encouraged them to ask questions as they arise throughout their visit. Provider Notes (Medical Decision Making): 58-year-old female with frequent ER visits. Returning to the ER after being discharged few hours ago complaining of abdominal pain and urinary frequency. I am not concerned about the frequency because she was given Lasix and nitro and this is part of her diuresis. Her abdominal exam is soft and diffusely tender. The patient does show pain seeking behavior and is asking for narcotics. She was supposed to get a repeat troponin prior to discharge but she refused so we will repeat the troponin EKG. Symptomatic treatment for her abdominal pain with Bentyl and Protonix. We will also check a UA. Procedures:     Critical Care Time:     Vital Signs-Reviewed the patient's vital signs. Reviewed pt's pulse ox reading. EKG: Interpreted by the EP. Time Interpreted:    Rate:    Rhythm:    Interpretation:   Comparison:     Records Reviewed: Nursing Notes (Time of Review: 5:52 AM)  -I am the first provider for this patient.  -I reviewed the vital signs, available nursing notes, past medical history, past surgical history, family history and social history.     Current Facility-Administered Medications   Medication Dose Route Frequency Provider Last Rate Last Dose    dicyclomine (BENTYL) 10 mg/mL injection 20 mg  20 mg IntraMUSCular AC&HS Nick Zarate MD        pantoprazole (PROTONIX) injection 40 mg  40 mg IntraVENous NOW Nick Zarate MD        cloNIDine HCL (CATAPRES) tablet 0.2 mg  0.2 mg Oral NOW Nick Zarate MD         Current Outpatient Medications   Medication Sig Dispense Refill    cloNIDine HCL (CATAPRES) 0.2 mg tablet Take 1 Tab by mouth two (2) times a day. 30 Tab 1    albuterol (PROVENTIL HFA, VENTOLIN HFA, PROAIR HFA) 90 mcg/actuation inhaler Take 2 Puffs by inhalation every four (4) hours as needed for Wheezing. 1 Inhaler 0    amLODIPine (NORVASC) 10 mg tablet Take 1 Tab by mouth daily. 15 Tab 0    ALPRAZolam (Xanax) 0.5 mg tablet Take 1 Tab by mouth every eight (8) hours as needed for Anxiety. Max Daily Amount: 1.5 mg. 20 Tab 0    albuterol (PROVENTIL HFA, VENTOLIN HFA, PROAIR HFA) 90 mcg/actuation inhaler Take 2 Puffs by inhalation every four (4) hours as needed for Wheezing. 1 Inhaler 0    aspirin delayed-release 81 mg tablet Take 1 Tab by mouth daily. 30 Tab 0    ondansetron (ZOFRAN ODT) 4 mg disintegrating tablet Take 1 Tab by mouth every eight (8) hours as needed for Nausea. Indications: prevent nausea and vomiting after surgery 20 Tab 0    famotidine (PEPCID) 20 mg tablet Take 1 Tab by mouth daily. 30 Tab 1    OXYGEN-AIR DELIVERY SYSTEMS 3 L by IntraNASal route as needed for Other (sob).  docusate sodium (COLACE) 100 mg capsule Take 1 Cap by mouth two (2) times a day. 120 Cap 0    fluticasone furoate-vilanterol (BREO ELLIPTA) 200-25 mcg/dose inhaler Take 1 Puff by inhalation daily.  arformoterol (BROVANA) 15 mcg/2 mL nebu neb solution 2 mL by Nebulization route two (2) times a day. 60 Vial 0    budesonide (PULMICORT) 0.5 mg/2 mL nbsp 2 mL by Nebulization route two (2) times a day.  60 Each 0    tiotropium (SPIRIVA) 18 mcg inhalation capsule Take 1 Cap by inhalation daily. 30 Cap 0    atorvastatin (LIPITOR) 20 mg tablet Take 1 Tab by mouth nightly. 30 Tab 0    escitalopram oxalate (LEXAPRO) 10 mg tablet Take 1 Tab by mouth every evening. 30 Tab 0    metoprolol tartrate (LOPRESSOR) 25 mg tablet Take 0.5 Tabs by mouth every twelve (12) hours. 30 Tab 0    roflumilast (DALIRESP) 500 mcg tab tablet Take 1 Tab by mouth daily. 30 Tab 0    OTHER Incentive spirometry- use as directed 1 Each 0    naloxone (NARCAN) 4 mg/actuation nasal spray Use 1 spray intranasally, then discard. Repeat with new spray every 2 min as needed for opioid overdose symptoms, alternating nostrils. 1 Each 0    montelukast (SINGULAIR) 10 mg tablet Take 1 Tab by mouth nightly. 30 Tab 1        Clinical Impression     Clinical Impression: No diagnosis found. Disposition: This note was dictated utilizing voice recognition software which may lead to typographical errors. I apologize in advance if the situation occurs. If questions arise please do not hesitate to contact me or call our department.     aCsi Matthews MD  5:52 AM

## 2020-06-24 ENCOUNTER — PATIENT OUTREACH (OUTPATIENT)
Dept: CASE MANAGEMENT | Age: 55
End: 2020-06-24

## 2020-06-24 NOTE — PROGRESS NOTES
Date/Time:  6/24/2020 10:52 AM  Attempted to reach patient by telephone. Left HIPPA compliant message requesting a return call. Will attempt to reach patient again.

## 2020-06-25 ENCOUNTER — PATIENT OUTREACH (OUTPATIENT)
Dept: CASE MANAGEMENT | Age: 55
End: 2020-06-25

## 2020-06-25 NOTE — PROGRESS NOTES
Date/Time:  6/25/2020 2:12 PM  Attempted to reach patient by telephone. Left HIPPA compliant message requesting a return call. Will attempt to reach patient again.

## 2020-06-26 ENCOUNTER — HOSPITAL ENCOUNTER (EMERGENCY)
Age: 55
Discharge: HOME | End: 2020-06-27
Attending: EMERGENCY MEDICINE
Payer: MEDICAID

## 2020-06-26 ENCOUNTER — APPOINTMENT (OUTPATIENT)
Dept: GENERAL RADIOLOGY | Age: 55
End: 2020-06-26
Attending: EMERGENCY MEDICINE
Payer: MEDICAID

## 2020-06-26 DIAGNOSIS — F41.1 ANXIETY STATE: Primary | ICD-10-CM

## 2020-06-26 DIAGNOSIS — I10 ELEVATED BLOOD PRESSURE READING WITH DIAGNOSIS OF HYPERTENSION: ICD-10-CM

## 2020-06-26 DIAGNOSIS — R07.89 ATYPICAL CHEST PAIN: ICD-10-CM

## 2020-06-26 LAB
ANION GAP SERPL CALC-SCNC: 3 MMOL/L (ref 3–18)
BASOPHILS # BLD: 0 K/UL (ref 0–0.1)
BASOPHILS NFR BLD: 0 % (ref 0–2)
BUN SERPL-MCNC: 18 MG/DL (ref 7–18)
BUN/CREAT SERPL: 16 (ref 12–20)
CALCIUM SERPL-MCNC: 9.2 MG/DL (ref 8.5–10.1)
CHLORIDE SERPL-SCNC: 110 MMOL/L (ref 100–111)
CK MB CFR SERPL CALC: 1.5 % (ref 0–4)
CK MB SERPL-MCNC: 1.3 NG/ML (ref 5–25)
CK SERPL-CCNC: 86 U/L (ref 26–192)
CO2 SERPL-SCNC: 31 MMOL/L (ref 21–32)
CREAT SERPL-MCNC: 1.12 MG/DL (ref 0.6–1.3)
DIFFERENTIAL METHOD BLD: NORMAL
EOSINOPHIL # BLD: 0.2 K/UL (ref 0–0.4)
EOSINOPHIL NFR BLD: 2 % (ref 0–5)
ERYTHROCYTE [DISTWIDTH] IN BLOOD BY AUTOMATED COUNT: 12.7 % (ref 11.6–14.5)
GLUCOSE SERPL-MCNC: 106 MG/DL (ref 74–99)
HCT VFR BLD AUTO: 39.9 % (ref 35–45)
HGB BLD-MCNC: 13.2 G/DL (ref 12–16)
LYMPHOCYTES # BLD: 2.3 K/UL (ref 0.9–3.6)
LYMPHOCYTES NFR BLD: 26 % (ref 21–52)
MCH RBC QN AUTO: 29.3 PG (ref 24–34)
MCHC RBC AUTO-ENTMCNC: 33.1 G/DL (ref 31–37)
MCV RBC AUTO: 88.7 FL (ref 74–97)
MONOCYTES # BLD: 0.5 K/UL (ref 0.05–1.2)
MONOCYTES NFR BLD: 6 % (ref 3–10)
NEUTS SEG # BLD: 5.7 K/UL (ref 1.8–8)
NEUTS SEG NFR BLD: 66 % (ref 40–73)
PLATELET # BLD AUTO: 224 K/UL (ref 135–420)
PMV BLD AUTO: 10.8 FL (ref 9.2–11.8)
POTASSIUM SERPL-SCNC: 3.3 MMOL/L (ref 3.5–5.5)
RBC # BLD AUTO: 4.5 M/UL (ref 4.2–5.3)
SODIUM SERPL-SCNC: 144 MMOL/L (ref 136–145)
TROPONIN I SERPL-MCNC: <0.02 NG/ML (ref 0–0.04)
WBC # BLD AUTO: 8.7 K/UL (ref 4.6–13.2)

## 2020-06-26 PROCEDURE — 74011000250 HC RX REV CODE- 250: Performed by: EMERGENCY MEDICINE

## 2020-06-26 PROCEDURE — 74011250636 HC RX REV CODE- 250/636: Performed by: EMERGENCY MEDICINE

## 2020-06-26 PROCEDURE — 96374 THER/PROPH/DIAG INJ IV PUSH: CPT

## 2020-06-26 PROCEDURE — 74011250637 HC RX REV CODE- 250/637: Performed by: EMERGENCY MEDICINE

## 2020-06-26 PROCEDURE — 82550 ASSAY OF CK (CPK): CPT

## 2020-06-26 PROCEDURE — 93005 ELECTROCARDIOGRAM TRACING: CPT

## 2020-06-26 PROCEDURE — 80048 BASIC METABOLIC PNL TOTAL CA: CPT

## 2020-06-26 PROCEDURE — 71045 X-RAY EXAM CHEST 1 VIEW: CPT

## 2020-06-26 PROCEDURE — 99285 EMERGENCY DEPT VISIT HI MDM: CPT

## 2020-06-26 PROCEDURE — 85025 COMPLETE CBC W/AUTO DIFF WBC: CPT

## 2020-06-26 RX ORDER — LORAZEPAM 2 MG/ML
1 INJECTION INTRAMUSCULAR
Status: COMPLETED | OUTPATIENT
Start: 2020-06-26 | End: 2020-06-26

## 2020-06-26 RX ORDER — CLONIDINE HYDROCHLORIDE 0.1 MG/1
0.2 TABLET ORAL
Status: COMPLETED | OUTPATIENT
Start: 2020-06-26 | End: 2020-06-26

## 2020-06-26 RX ORDER — IPRATROPIUM BROMIDE AND ALBUTEROL SULFATE 2.5; .5 MG/3ML; MG/3ML
3 SOLUTION RESPIRATORY (INHALATION)
Status: COMPLETED | OUTPATIENT
Start: 2020-06-26 | End: 2020-06-26

## 2020-06-26 RX ADMIN — CLONIDINE HYDROCHLORIDE 0.2 MG: 0.1 TABLET ORAL at 23:10

## 2020-06-26 RX ADMIN — IPRATROPIUM BROMIDE AND ALBUTEROL SULFATE 3 ML: .5; 3 SOLUTION RESPIRATORY (INHALATION) at 00:40

## 2020-06-26 RX ADMIN — LORAZEPAM 1 MG: 2 INJECTION, SOLUTION INTRAMUSCULAR; INTRAVENOUS at 22:45

## 2020-06-27 ENCOUNTER — PATIENT OUTREACH (OUTPATIENT)
Dept: CASE MANAGEMENT | Age: 55
End: 2020-06-27

## 2020-06-27 VITALS
WEIGHT: 155 LBS | HEART RATE: 81 BPM | DIASTOLIC BLOOD PRESSURE: 59 MMHG | SYSTOLIC BLOOD PRESSURE: 143 MMHG | TEMPERATURE: 98.3 F | RESPIRATION RATE: 19 BRPM | BODY MASS INDEX: 31.25 KG/M2 | OXYGEN SATURATION: 94 % | HEIGHT: 59 IN

## 2020-06-27 LAB
ATRIAL RATE: 88 BPM
CALCULATED P AXIS, ECG09: 70 DEGREES
CALCULATED R AXIS, ECG10: -35 DEGREES
CALCULATED T AXIS, ECG11: 112 DEGREES
DIAGNOSIS, 93000: NORMAL
P-R INTERVAL, ECG05: 146 MS
Q-T INTERVAL, ECG07: 416 MS
QRS DURATION, ECG06: 128 MS
QTC CALCULATION (BEZET), ECG08: 503 MS
VENTRICULAR RATE, ECG03: 88 BPM

## 2020-06-27 PROCEDURE — 74011250637 HC RX REV CODE- 250/637: Performed by: EMERGENCY MEDICINE

## 2020-06-27 RX ORDER — ALPRAZOLAM 0.25 MG/1
0.5 TABLET ORAL
Status: COMPLETED | OUTPATIENT
Start: 2020-06-27 | End: 2020-06-27

## 2020-06-27 RX ADMIN — ALPRAZOLAM 0.5 MG: 0.25 TABLET ORAL at 02:20

## 2020-06-27 NOTE — DISCHARGE INSTRUCTIONS
Patient Education        If you were prescribed any medication take as directed. Follow up with your primary care physician or with specialist as directed. Return to the emergency room with any new or worsening conditions. Chest Pain: Care Instructions  Your Care Instructions     There are many things that can cause chest pain. Some are not serious and will get better on their own in a few days. But some kinds of chest pain need more testing and treatment. Your doctor may have recommended a follow-up visit in the next 8 to 12 hours. If you are not getting better, you may need more tests or treatment. Even though your doctor has released you, you still need to watch for any problems. The doctor carefully checked you, but sometimes problems can develop later. If you have new symptoms or if your symptoms do not get better, get medical care right away. If you have worse or different chest pain or pressure that lasts more than 5 minutes or you passed out (lost consciousness), [id number] or seek other emergency help right away. A medical visit is only one step in your treatment. Even if you feel better, you still need to do what your doctor recommends, such as going to all suggested follow-up appointments and taking medicines exactly as directed. This will help you recover and help prevent future problems. How can you care for yourself at home? · Rest until you feel better. · Take your medicine exactly as prescribed. Call your doctor if you think you are having a problem with your medicine. · Do not drive after taking a prescription pain medicine. When should you call for help? [id number]:   · You passed out (lost consciousness). · You have severe difficulty breathing. · You have symptoms of a heart attack. These may include:  ? Chest pain or pressure, or a strange feeling in your chest.  ? Sweating. ? Shortness of breath. ? Nausea or vomiting.   ? Pain, pressure, or a strange feeling in your back, neck, jaw, or upper belly or in one or both shoulders or arms. ? Lightheadedness or sudden weakness. ? A fast or irregular heartbeat. After you call 911, the  may tell you to chew 1 adult-strength or 2 to 4 low-dose aspirin. Wait for an ambulance. Do not try to drive yourself. Call your doctor today if:   · You have any trouble breathing. · Your chest pain gets worse. · You are dizzy or lightheaded, or you feel like you may faint. · You are not getting better as expected. · You are having new or different chest pain. Where can you learn more? Go to http://magdahi5kadie.info/  Enter A120 in the search box to learn more about \"Chest Pain: Care Instructions. \"  Current as of: June 26, 2019               Content Version: 12.5  © 5146-0132 [employer]. Care instructions adapted under license by [employer] (which disclaims liability or warranty for this information). If you have questions about a medical condition or this instruction, always ask your healthcare professional. [place] [address] any warranty or liability for your use of this information. Patient Education        Learning About Anxiety Disorders  What are anxiety disorders? Anxiety disorders are a type of medical problem. They cause severe anxiety. When you feel anxious, you feel that something bad is about to happen. This feeling interferes with your life. These disorders include:  · Generalized anxiety disorder. You feel worried and stressed about many everyday events and activities. This goes on for several months and disrupts your life on most days. · Panic disorder. You have repeated panic attacks. A panic attack is a sudden, intense fear or anxiety. It may make you feel short of breath. Your heart may pound. · Social anxiety disorder. You feel very anxious about what you will say or do in front of people. For example, you may be scared to talk or eat in public. This problem affects your daily life. · Phobias. You are very scared of a specific object, situation, or activity. For example, you may fear spiders, high places, or small spaces. What are the symptoms? Generalized anxiety disorder  Symptoms may include:  · Feeling worried and stressed about many things almost every day. · Feeling tired or irritable. You may have a hard time concentrating. · Having headaches or muscle aches. · Having a hard time getting to sleep or staying asleep. Panic disorder  You may have repeated panic attacks when there is no reason for feeling afraid. You may change your daily activities because you worry that you will have another attack. Symptoms may include:  · Intense fear, terror, or anxiety. · Trouble breathing or very fast breathing. · Chest pain or tightness. · A heartbeat that races or is not regular. Social anxiety disorder  Symptoms may include:  · Fear about a social situation, such as eating in front of others or speaking in public. You may worry a lot. Or you may be afraid that something bad will happen. · Anxiety that can cause you to blush, sweat, and feel shaky. · A heartbeat that is faster than normal.  · A hard time focusing. Phobias  Symptoms may include:  · More fear than most people of being around an object, being in a situation, or doing an activity. You might also be stressed about the chance of being around the thing you fear. · Worry about losing control, panicking, fainting, or having physical symptoms like a faster heartbeat when you are around the situation or object. How are these disorders treated? Anxiety disorders can be treated with medicines or counseling. A combination of both may be used. Medicines may include:  · Antidepressants. These may help your symptoms by keeping chemicals in your brain in balance. · Benzodiazepines. These may give you short-term relief of your symptoms. Some people use cognitive-behavioral therapy.  A therapist helps you learn to change stressful or bad thoughts into helpful thoughts. Lead a healthy lifestyle  A healthy lifestyle may help you feel better. · Get at least 30 minutes of exercise on most days of the week. Walking is a good choice. · Eat a healthy diet. Include fruits, vegetables, lean proteins, and whole grains in your diet each day. · Try to go to bed at the same time every night. Try for 8 hours of sleep a night. · Find ways to manage stress. Try relaxation exercises. · Avoid alcohol and illegal drugs. Follow-up care is a key part of your treatment and safety. Be sure to make and go to all appointments, and call your doctor if you are having problems. It's also a good idea to know your test results and keep a list of the medicines you take. Where can you learn more? Go to http://magda-kadie.info/  Enter C489 in the search box to learn more about \"Learning About Anxiety Disorders. \"  Current as of: January 31, 2020               Content Version: 12.5  © 2006-2020 [employer]. Care instructions adapted under license by [employer] (which disclaims liability or warranty for this information). If you have questions about a medical condition or this instruction, always ask your healthcare professional. [address] any warranty or liability for your use of this information.

## 2020-06-27 NOTE — ED TRIAGE NOTES
Pt came in by EMS from home. Pt is complaining of chest pain that is a 9 out of ten. Pts blood pressure medication was changed from clonidine to amlodapine. Pt said the last time she took her blood pressure meds was this morning as prescribed.

## 2020-06-27 NOTE — ED PROVIDER NOTES
EMERGENCY DEPARTMENT HISTORY AND PHYSICAL EXAM    10:22 PM      Date: 6/26/2020  Patient Name: Burgess Lopez    History of Presenting Illness     Chief Complaint   Patient presents with    Chest Pain         History Provided By: Patient    Additional History (Context): Burgess Lopez is a 53 y.o. female with Past medical history of asthma, anxiety, and STEMI, CHF, GERD, who presents with chief complaint of nonradiating chest pain for the past few days. ,  Associated symptoms are shortness of breath and anxiety. States that she is under tremendous stress as her mother and brother passed away in the past week. She states that she just buried them. She states that she will be living in a house alone now. She states that she took her blood pressure medicine earlier this morning. no cough, dizziness, abdominal pain, nausea, leg pain, leg swelling, depression, no other complaint. PCP: Rossie Cowden, NP        Past History     Past Medical History:  Past Medical History:   Diagnosis Date    Asthma     CHF (congestive heart failure) (HCC)     Chronic obstructive pulmonary disease (HCC)     Dependence on supplemental oxygen     Endocrine disease     thyroid issues    Gastrointestinal disorder     \"blockage in my stomach\"    Hypercholesterolemia     Hypertension        Past Surgical History:  Past Surgical History:   Procedure Laterality Date    HX GI      Exploratory laparotomy with lysis of adhesions and primary repair of incarcerated umbilical hernia       Family History:  History reviewed. No pertinent family history.     Social History:  Social History     Tobacco Use    Smoking status: Current Every Day Smoker     Packs/day: 0.25    Smokeless tobacco: Former User   Substance Use Topics    Alcohol use: No     Comment: socially    Drug use: Not Currently     Types: Heroin       Allergies:  No Known Allergies      Review of Systems       Review of Systems   Constitutional: Negative for chills and fever.   HENT: Negative for congestion, rhinorrhea, sore throat and trouble swallowing. Eyes: Negative for visual disturbance. Respiratory: Positive for shortness of breath. Negative for cough and wheezing. Cardiovascular: Positive for chest pain. Negative for palpitations and leg swelling. Gastrointestinal: Negative for abdominal pain, nausea and vomiting. Endocrine: Negative for polyuria. Genitourinary: Negative for dysuria and flank pain. Musculoskeletal: Negative for arthralgias and neck stiffness. Skin: Negative for pallor and rash. Neurological: Negative for dizziness, syncope, speech difficulty, weakness, numbness and headaches. Hematological: Does not bruise/bleed easily. Psychiatric/Behavioral: Negative for confusion and dysphoric mood. The patient is nervous/anxious. All other systems reviewed and are negative. Physical Exam     Visit Vitals  /59   Pulse 81   Temp 98.3 °F (36.8 °C)   Resp 19   Ht 4' 11\" (1.499 m)   Wt 70.3 kg (155 lb)   LMP 04/14/2009   SpO2 94%   BMI 31.31 kg/m²         Physical Exam  Vitals signs and nursing note reviewed. Constitutional:       General: She is not in acute distress. Appearance: She is well-developed. She is not ill-appearing or diaphoretic. HENT:      Head: Normocephalic and atraumatic. Eyes:      General: No scleral icterus. Conjunctiva/sclera: Conjunctivae normal.      Pupils: Pupils are equal, round, and reactive to light. Neck:      Musculoskeletal: Normal range of motion and neck supple. Cardiovascular:      Rate and Rhythm: Normal rate. Pulses: Normal pulses. Heart sounds: Normal heart sounds. No murmur. No gallop. Comments: Capillary refill < 3 seconds  Pulmonary:      Effort: Pulmonary effort is normal. No respiratory distress. Breath sounds: Normal breath sounds. No decreased breath sounds, wheezing, rhonchi or rales.    Abdominal:      General: Bowel sounds are normal. There is no distension. Palpations: Abdomen is soft. Musculoskeletal: Normal range of motion. General: No tenderness. Right lower leg: No edema. Left lower leg: No edema. Lymphadenopathy:      Cervical: No cervical adenopathy. Skin:     General: Skin is warm and dry. Coloration: Skin is not jaundiced or pale. Findings: No erythema. Neurological:      General: No focal deficit present. Mental Status: She is alert and oriented to person, place, and time. Cranial Nerves: No cranial nerve deficit. Sensory: No sensory deficit. Motor: No weakness. Coordination: Coordination normal.   Psychiatric:         Mood and Affect: Mood is anxious. Thought Content: Thought content normal.           Diagnostic Study Results     Labs -  Recent Results (from the past 12 hour(s))   EKG, 12 LEAD, INITIAL    Collection Time: 06/26/20 10:01 PM   Result Value Ref Range    Ventricular Rate 88 BPM    Atrial Rate 88 BPM    P-R Interval 146 ms    QRS Duration 128 ms    Q-T Interval 416 ms    QTC Calculation (Bezet) 503 ms    Calculated P Axis 70 degrees    Calculated R Axis -35 degrees    Calculated T Axis 112 degrees    Diagnosis       Normal sinus rhythm  Left axis deviation  Left bundle branch block  Abnormal ECG  When compared with ECG of 23-JUN-2020 16:03,  Left bundle branch block is now present  Criteria for Anteroseptal infarct are no longer present     CARDIAC PANEL,(CK, CKMB & TROPONIN)    Collection Time: 06/26/20 10:09 PM   Result Value Ref Range    CK - MB 1.3 <3.6 ng/ml    CK-MB Index 1.5 0.0 - 4.0 %    CK 86 26 - 192 U/L    Troponin-I, QT <0.02 0.0 - 0.045 NG/ML   CBC WITH AUTOMATED DIFF    Collection Time: 06/26/20 10:09 PM   Result Value Ref Range    WBC 8.7 4.6 - 13.2 K/uL    RBC 4.50 4. 20 - 5.30 M/uL    HGB 13.2 12.0 - 16.0 g/dL    HCT 39.9 35.0 - 45.0 %    MCV 88.7 74.0 - 97.0 FL    MCH 29.3 24.0 - 34.0 PG    MCHC 33.1 31.0 - 37.0 g/dL    RDW 12.7 11.6 - 14.5 % PLATELET 153 154 - 980 K/uL    MPV 10.8 9.2 - 11.8 FL    NEUTROPHILS 66 40 - 73 %    LYMPHOCYTES 26 21 - 52 %    MONOCYTES 6 3 - 10 %    EOSINOPHILS 2 0 - 5 %    BASOPHILS 0 0 - 2 %    ABS. NEUTROPHILS 5.7 1.8 - 8.0 K/UL    ABS. LYMPHOCYTES 2.3 0.9 - 3.6 K/UL    ABS. MONOCYTES 0.5 0.05 - 1.2 K/UL    ABS. EOSINOPHILS 0.2 0.0 - 0.4 K/UL    ABS. BASOPHILS 0.0 0.0 - 0.1 K/UL    DF AUTOMATED     METABOLIC PANEL, BASIC    Collection Time: 06/26/20 10:09 PM   Result Value Ref Range    Sodium 144 136 - 145 mmol/L    Potassium 3.3 (L) 3.5 - 5.5 mmol/L    Chloride 110 100 - 111 mmol/L    CO2 31 21 - 32 mmol/L    Anion gap 3 3.0 - 18 mmol/L    Glucose 106 (H) 74 - 99 mg/dL    BUN 18 7.0 - 18 MG/DL    Creatinine 1.12 0.6 - 1.3 MG/DL    BUN/Creatinine ratio 16 12 - 20      GFR est AA >60 >60 ml/min/1.73m2    GFR est non-AA 51 (L) >60 ml/min/1.73m2    Calcium 9.2 8.5 - 10.1 MG/DL       Radiologic Studies -   XR CHEST PORT    (Results Pending)   My preliminary read no acute process, chest x-ray similar to 6/22/2020  Mirella Mcdonald DO      Medical Decision Making   I am the first provider for this patient. I reviewed the vital signs, available nursing notes, past medical history, past surgical history, family history and social history. Vital Signs-Reviewed the patient's vital signs. Pulse Oximetry Analysis -  97 on room air (Interpretation) normal    Cardiac Monitor:  Rate: 94  Rhythm:  Normal Sinus Rhythm     EKG: Interpreted by the EP Dr. Kayla Roa.    Time Interpreted: 0727   Rate: 88   Rhythm: Sinus rhythm   Interpretation: Prolonged QTC, left bundle branch block, no ST elevation, no ST depression   Comparison: History of left bundle branch block    Records Reviewed: Nursing Notes and Old Medical Records (Time of Review: 10:22 PM)    Provider Notes (Medical Decision Making): DDX: Anxiety attack, metabolic, cardiac, bronchospasm, COPD, stress related    Labs, EKG, chest x-ray, Ativan, DuoNeb    MDM    Medications   LORazepam (ATIVAN) injection 1 mg (1 mg IntraVENous Given 6/26/20 2245)   cloNIDine HCL (CATAPRES) tablet 0.2 mg (0.2 mg Oral Given 6/26/20 2310)   albuterol-ipratropium (DUO-NEB) 2.5 MG-0.5 MG/3 ML (3 mL Nebulization Given 6/26/20 0040)           ED Course: Progress Notes, Reevaluation, and Consults:  Labs reassuring    Feeling better after anti-anxiety medication    Patient states that she does have her blood pressure medication at home and she does take clonidine and was also started on amlodipine. I have reassessed the patient. I have discussed the workup, results and plan with the patient and patient is in agreement. Patient is feeling better. Patient was discharge in stable condition. Patient was given outpatient follow up. Patient is to return to emergency department if any new or worsening condition. Diagnosis     Clinical Impression:   1. Anxiety state    2. Atypical chest pain    3. Elevated blood pressure reading with diagnosis of hypertension        Disposition: Discharged    Follow-up Information     Follow up With Specialties Details Why Contact Info    Mandeep Garnica NP Nurse Practitioner Schedule an appointment as soon as possible for a visit in 2 days  [address] [facility]             Patient's Medications   Start Taking    No medications on file   Continue Taking    ALBUTEROL (PROVENTIL HFA, VENTOLIN HFA, PROAIR HFA) 90 MCG/ACTUATION INHALER    Take 2 Puffs by inhalation every four (4) hours as needed for Wheezing. ALBUTEROL (PROVENTIL HFA, VENTOLIN HFA, PROAIR HFA) 90 MCG/ACTUATION INHALER    Take 2 Puffs by inhalation every four (4) hours as needed for Wheezing. ALPRAZOLAM (XANAX) 0.5 MG TABLET    Take 1 Tab by mouth every eight (8) hours as needed for Anxiety. Max Daily Amount: 1.5 mg.    AMLODIPINE (NORVASC) 10 MG TABLET    Take 1 Tab by mouth daily. ARFORMOTEROL (BROVANA) 15 MCG/2 ML NEBU NEB SOLUTION    2 mL by Nebulization route two (2) times a day. ASPIRIN DELAYED-RELEASE 81 MG TABLET    Take 1 Tab by mouth daily. ATORVASTATIN (LIPITOR) 20 MG TABLET    Take 1 Tab by mouth nightly. BUDESONIDE (PULMICORT) 0.5 MG/2 ML NBSP    2 mL by Nebulization route two (2) times a day. CLONIDINE HCL (CATAPRES) 0.2 MG TABLET    Take 1 Tab by mouth two (2) times a day. DOCUSATE SODIUM (COLACE) 100 MG CAPSULE    Take 1 Cap by mouth two (2) times a day. ESCITALOPRAM OXALATE (LEXAPRO) 10 MG TABLET    Take 1 Tab by mouth every evening. FAMOTIDINE (PEPCID) 20 MG TABLET    Take 1 Tab by mouth daily. FLUTICASONE FUROATE-VILANTEROL (BREO ELLIPTA) 200-25 MCG/DOSE INHALER    Take 1 Puff by inhalation daily. METOPROLOL TARTRATE (LOPRESSOR) 25 MG TABLET    Take 0.5 Tabs by mouth every twelve (12) hours. MONTELUKAST (SINGULAIR) 10 MG TABLET    Take 1 Tab by mouth nightly. NALOXONE (NARCAN) 4 MG/ACTUATION NASAL SPRAY    Use 1 spray intranasally, then discard. Repeat with new spray every 2 min as needed for opioid overdose symptoms, alternating nostrils. ONDANSETRON (ZOFRAN ODT) 4 MG DISINTEGRATING TABLET    Take 1 Tab by mouth every eight (8) hours as needed for Nausea. Indications: prevent nausea and vomiting after surgery    OTHER    Incentive spirometry- use as directed    OXYGEN-AIR DELIVERY SYSTEMS    3 L by IntraNASal route as needed for Other (sob). ROFLUMILAST (DALIRESP) 500 MCG TAB TABLET    Take 1 Tab by mouth daily. TIOTROPIUM (SPIRIVA) 18 MCG INHALATION CAPSULE    Take 1 Cap by inhalation daily. These Medications have changed    No medications on file   Stop Taking    No medications on file         Bart Melton DO    Dragon medical dictation software was used for portions of this report. Unintended transcription errors may occur. My signature above authenticates this document and my orders, the final    diagnosis (es), discharge prescription (s), and instructions in the Epic    record.

## 2020-06-29 ENCOUNTER — PATIENT OUTREACH (OUTPATIENT)
Dept: CASE MANAGEMENT | Age: 55
End: 2020-06-29

## 2020-06-30 ENCOUNTER — APPOINTMENT (OUTPATIENT)
Dept: GENERAL RADIOLOGY | Age: 55
End: 2020-06-30
Attending: PHYSICIAN ASSISTANT
Payer: MEDICAID

## 2020-06-30 ENCOUNTER — HOSPITAL ENCOUNTER (EMERGENCY)
Age: 55
Discharge: HOME | End: 2020-06-30
Attending: EMERGENCY MEDICINE
Payer: MEDICAID

## 2020-06-30 VITALS
TEMPERATURE: 98.3 F | SYSTOLIC BLOOD PRESSURE: 179 MMHG | BODY MASS INDEX: 31.25 KG/M2 | HEART RATE: 95 BPM | RESPIRATION RATE: 16 BRPM | HEIGHT: 59 IN | OXYGEN SATURATION: 98 % | DIASTOLIC BLOOD PRESSURE: 96 MMHG | WEIGHT: 155 LBS

## 2020-06-30 DIAGNOSIS — R07.9 CHEST PAIN, UNSPECIFIED TYPE: ICD-10-CM

## 2020-06-30 DIAGNOSIS — J44.1 COPD EXACERBATION (HCC): ICD-10-CM

## 2020-06-30 DIAGNOSIS — F41.1 ANXIETY STATE: Primary | ICD-10-CM

## 2020-06-30 LAB
ALBUMIN SERPL-MCNC: 3.7 G/DL (ref 3.4–5)
ALBUMIN/GLOB SERPL: 1.1 {RATIO} (ref 0.8–1.7)
ALP SERPL-CCNC: 81 U/L (ref 45–117)
ALT SERPL-CCNC: 16 U/L (ref 13–56)
AMPHET UR QL SCN: NEGATIVE
ANION GAP SERPL CALC-SCNC: 4 MMOL/L (ref 3–18)
APPEARANCE UR: ABNORMAL
AST SERPL-CCNC: 12 U/L (ref 10–38)
BACTERIA URNS QL MICRO: ABNORMAL /HPF
BARBITURATES UR QL SCN: NEGATIVE
BASOPHILS # BLD: 0 K/UL (ref 0–0.1)
BASOPHILS NFR BLD: 0 % (ref 0–2)
BENZODIAZ UR QL: NEGATIVE
BILIRUB SERPL-MCNC: 0.4 MG/DL (ref 0.2–1)
BILIRUB UR QL: NEGATIVE
BUN SERPL-MCNC: 14 MG/DL (ref 7–18)
BUN/CREAT SERPL: 13 (ref 12–20)
CALCIUM SERPL-MCNC: 8.7 MG/DL (ref 8.5–10.1)
CANNABINOIDS UR QL SCN: NEGATIVE
CHLORIDE SERPL-SCNC: 113 MMOL/L (ref 100–111)
CO2 SERPL-SCNC: 30 MMOL/L (ref 21–32)
COCAINE UR QL SCN: POSITIVE
COLOR UR: YELLOW
CREAT SERPL-MCNC: 1.1 MG/DL (ref 0.6–1.3)
DIFFERENTIAL METHOD BLD: NORMAL
EOSINOPHIL # BLD: 0.1 K/UL (ref 0–0.4)
EOSINOPHIL NFR BLD: 1 % (ref 0–5)
EPITH CASTS URNS QL MICRO: ABNORMAL /LPF (ref 0–5)
ERYTHROCYTE [DISTWIDTH] IN BLOOD BY AUTOMATED COUNT: 12.3 % (ref 11.6–14.5)
GLOBULIN SER CALC-MCNC: 3.4 G/DL (ref 2–4)
GLUCOSE SERPL-MCNC: 98 MG/DL (ref 74–99)
GLUCOSE UR STRIP.AUTO-MCNC: 100 MG/DL
GRAN CASTS URNS QL MICRO: ABNORMAL /LPF
HCT VFR BLD AUTO: 38.9 % (ref 35–45)
HDSCOM,HDSCOM: ABNORMAL
HGB BLD-MCNC: 12.8 G/DL (ref 12–16)
HGB UR QL STRIP: NEGATIVE
KETONES UR QL STRIP.AUTO: NEGATIVE MG/DL
LEUKOCYTE ESTERASE UR QL STRIP.AUTO: NEGATIVE
LYMPHOCYTES # BLD: 1.5 K/UL (ref 0.9–3.6)
LYMPHOCYTES NFR BLD: 26 % (ref 21–52)
MAGNESIUM SERPL-MCNC: 2.1 MG/DL (ref 1.6–2.6)
MCH RBC QN AUTO: 29.6 PG (ref 24–34)
MCHC RBC AUTO-ENTMCNC: 32.9 G/DL (ref 31–37)
MCV RBC AUTO: 89.8 FL (ref 74–97)
METHADONE UR QL: NEGATIVE
MONOCYTES # BLD: 0.4 K/UL (ref 0.05–1.2)
MONOCYTES NFR BLD: 6 % (ref 3–10)
NEUTS SEG # BLD: 3.8 K/UL (ref 1.8–8)
NEUTS SEG NFR BLD: 67 % (ref 40–73)
NITRITE UR QL STRIP.AUTO: NEGATIVE
OPIATES UR QL: NEGATIVE
PCP UR QL: NEGATIVE
PH UR STRIP: 5 [PH] (ref 5–8)
PLATELET # BLD AUTO: 227 K/UL (ref 135–420)
PMV BLD AUTO: 10.6 FL (ref 9.2–11.8)
POTASSIUM SERPL-SCNC: 3.8 MMOL/L (ref 3.5–5.5)
PROT SERPL-MCNC: 7.1 G/DL (ref 6.4–8.2)
PROT UR STRIP-MCNC: ABNORMAL MG/DL
RBC # BLD AUTO: 4.33 M/UL (ref 4.2–5.3)
RBC #/AREA URNS HPF: NEGATIVE /HPF (ref 0–5)
SODIUM SERPL-SCNC: 147 MMOL/L (ref 136–145)
SP GR UR REFRACTOMETRY: 1.02 (ref 1–1.03)
TROPONIN I SERPL-MCNC: <0.02 NG/ML (ref 0–0.04)
UROBILINOGEN UR QL STRIP.AUTO: 0.2 EU/DL (ref 0.2–1)
WBC # BLD AUTO: 5.7 K/UL (ref 4.6–13.2)
WBC URNS QL MICRO: ABNORMAL /HPF (ref 0–4)

## 2020-06-30 PROCEDURE — 85025 COMPLETE CBC W/AUTO DIFF WBC: CPT

## 2020-06-30 PROCEDURE — 84484 ASSAY OF TROPONIN QUANT: CPT

## 2020-06-30 PROCEDURE — 80053 COMPREHEN METABOLIC PANEL: CPT

## 2020-06-30 PROCEDURE — 99284 EMERGENCY DEPT VISIT MOD MDM: CPT

## 2020-06-30 PROCEDURE — 81001 URINALYSIS AUTO W/SCOPE: CPT

## 2020-06-30 PROCEDURE — 74011250637 HC RX REV CODE- 250/637: Performed by: PHYSICIAN ASSISTANT

## 2020-06-30 PROCEDURE — 71045 X-RAY EXAM CHEST 1 VIEW: CPT

## 2020-06-30 PROCEDURE — 87086 URINE CULTURE/COLONY COUNT: CPT

## 2020-06-30 PROCEDURE — 93005 ELECTROCARDIOGRAM TRACING: CPT

## 2020-06-30 PROCEDURE — 80307 DRUG TEST PRSMV CHEM ANLYZR: CPT

## 2020-06-30 PROCEDURE — 83735 ASSAY OF MAGNESIUM: CPT

## 2020-06-30 PROCEDURE — 74011636637 HC RX REV CODE- 636/637: Performed by: PHYSICIAN ASSISTANT

## 2020-06-30 RX ORDER — ALPRAZOLAM 0.25 MG/1
0.5 TABLET ORAL
Status: COMPLETED | OUTPATIENT
Start: 2020-06-30 | End: 2020-06-30

## 2020-06-30 RX ORDER — PREDNISONE 20 MG/1
60 TABLET ORAL
Status: COMPLETED | OUTPATIENT
Start: 2020-06-30 | End: 2020-06-30

## 2020-06-30 RX ORDER — PREDNISONE 10 MG/1
TABLET ORAL
Qty: 21 TAB | Refills: 0 | OUTPATIENT
Start: 2020-06-30 | End: 2020-07-06

## 2020-06-30 RX ORDER — IPRATROPIUM BROMIDE AND ALBUTEROL SULFATE 2.5; .5 MG/3ML; MG/3ML
3 SOLUTION RESPIRATORY (INHALATION)
Qty: 30 NEBULE | Refills: 0 | Status: SHIPPED | OUTPATIENT
Start: 2020-06-30 | End: 2020-07-12

## 2020-06-30 RX ORDER — IPRATROPIUM BROMIDE AND ALBUTEROL SULFATE 2.5; .5 MG/3ML; MG/3ML
3 SOLUTION RESPIRATORY (INHALATION)
Qty: 30 NEBULE | Refills: 0 | Status: SHIPPED | OUTPATIENT
Start: 2020-06-30 | End: 2020-06-30

## 2020-06-30 RX ADMIN — ALPRAZOLAM 0.5 MG: 0.25 TABLET ORAL at 19:53

## 2020-06-30 RX ADMIN — PREDNISONE 60 MG: 20 TABLET ORAL at 19:53

## 2020-06-30 NOTE — ED TRIAGE NOTES
\"I lost my mom and brother in the same week. My anxiety is off the chain. I ran out of my Xanax two days ago. \"

## 2020-06-30 NOTE — ED NOTES
PT arrived via ems from home with complaints of chest pain.  Per medics, pt stated that she consumed drugs before arrival of ems

## 2020-06-30 NOTE — ED PROVIDER NOTES
EMERGENCY DEPARTMENT HISTORY AND PHYSICAL EXAM    Date: 6/30/2020  Patient Name: Pascual Mcneill    History of Presenting Illness     Chief Complaint   Patient presents with    Anxiety         History Provided By:patient    Chief Complaint: chest pain and anxiety  Duration: since yesterday  Timing: acute  Location: mid sternal  Quality:aching, nervousness  Severity: moderate  Modifying Factors: xanax helps, out of this medication  Associated Symptoms: chest pain, wheezing secondary to COPD/asthma, anxiety      Additional History (Context): Pascual Mcneill is a 53 y.o. female with PMH asthma, htn, hypercholesterolemia, COPD, and CHFwho presents with c/o chest pain, continued wheezing secondary to COPD/asthma, and increased anxiety x a few days. Pt states she recently lost her brother and mother. This has increased her anxiety. States she is out of xanax which usually helps her sx, and is almost out of her asthma meds. No other complaints reported. PCP: Emily Hahn NP    Current Outpatient Medications   Medication Sig Dispense Refill    predniSONE (STERAPRED DS) 10 mg dose pack Use as directed, start on 7/1/2020 21 Tab 0    albuterol-ipratropium (DUO-NEB) 2.5 mg-0.5 mg/3 ml nebu 3 mL by Nebulization route every four (4) hours as needed for Wheezing. 30 Nebule 0    cloNIDine HCL (CATAPRES) 0.2 mg tablet Take 1 Tab by mouth two (2) times a day. 30 Tab 1    albuterol (PROVENTIL HFA, VENTOLIN HFA, PROAIR HFA) 90 mcg/actuation inhaler Take 2 Puffs by inhalation every four (4) hours as needed for Wheezing. 1 Inhaler 0    amLODIPine (NORVASC) 10 mg tablet Take 1 Tab by mouth daily. 15 Tab 0    ALPRAZolam (Xanax) 0.5 mg tablet Take 1 Tab by mouth every eight (8) hours as needed for Anxiety. Max Daily Amount: 1.5 mg. 20 Tab 0    albuterol (PROVENTIL HFA, VENTOLIN HFA, PROAIR HFA) 90 mcg/actuation inhaler Take 2 Puffs by inhalation every four (4) hours as needed for Wheezing.  1 Inhaler 0    aspirin delayed-release 81 mg tablet Take 1 Tab by mouth daily. 30 Tab 0    ondansetron (ZOFRAN ODT) 4 mg disintegrating tablet Take 1 Tab by mouth every eight (8) hours as needed for Nausea. Indications: prevent nausea and vomiting after surgery 20 Tab 0    famotidine (PEPCID) 20 mg tablet Take 1 Tab by mouth daily. 30 Tab 1    OXYGEN-AIR DELIVERY SYSTEMS 3 L by IntraNASal route as needed for Other (sob).  docusate sodium (COLACE) 100 mg capsule Take 1 Cap by mouth two (2) times a day. 120 Cap 0    fluticasone furoate-vilanterol (BREO ELLIPTA) 200-25 mcg/dose inhaler Take 1 Puff by inhalation daily.  arformoterol (BROVANA) 15 mcg/2 mL nebu neb solution 2 mL by Nebulization route two (2) times a day. 60 Vial 0    budesonide (PULMICORT) 0.5 mg/2 mL nbsp 2 mL by Nebulization route two (2) times a day. 60 Each 0    tiotropium (SPIRIVA) 18 mcg inhalation capsule Take 1 Cap by inhalation daily. 30 Cap 0    atorvastatin (LIPITOR) 20 mg tablet Take 1 Tab by mouth nightly. 30 Tab 0    escitalopram oxalate (LEXAPRO) 10 mg tablet Take 1 Tab by mouth every evening. 30 Tab 0    metoprolol tartrate (LOPRESSOR) 25 mg tablet Take 0.5 Tabs by mouth every twelve (12) hours. 30 Tab 0    roflumilast (DALIRESP) 500 mcg tab tablet Take 1 Tab by mouth daily. 30 Tab 0    OTHER Incentive spirometry- use as directed 1 Each 0    naloxone (NARCAN) 4 mg/actuation nasal spray Use 1 spray intranasally, then discard. Repeat with new spray every 2 min as needed for opioid overdose symptoms, alternating nostrils. 1 Each 0    montelukast (SINGULAIR) 10 mg tablet Take 1 Tab by mouth nightly.  30 Tab 1       Past History     Past Medical History:  Past Medical History:   Diagnosis Date    Asthma     CHF (congestive heart failure) (HCC)     Chronic obstructive pulmonary disease (HCC)     Dependence on supplemental oxygen     Endocrine disease     thyroid issues    Gastrointestinal disorder     \"blockage in my stomach\"    Hypercholesterolemia     Hypertension        Past Surgical History:  Past Surgical History:   Procedure Laterality Date    HX GI      Exploratory laparotomy with lysis of adhesions and primary repair of incarcerated umbilical hernia       Family History:  History reviewed. No pertinent family history. Social History:  Social History     Tobacco Use    Smoking status: Current Every Day Smoker     Packs/day: 0.25    Smokeless tobacco: Former User   Substance Use Topics    Alcohol use: No     Comment: socially    Drug use: Not Currently     Types: Heroin       Allergies:  No Known Allergies      Review of Systems   Review of Systems   Constitutional: Negative. Negative for chills and fever. HENT: Negative. Negative for congestion, ear pain and rhinorrhea. Eyes: Negative. Negative for pain and redness. Respiratory: Negative. Negative for cough, shortness of breath, wheezing and stridor. Cardiovascular: Negative for leg swelling. Gastrointestinal: Negative. Negative for abdominal pain, constipation, diarrhea, nausea and vomiting. Genitourinary: Negative. Negative for dysuria and frequency. Musculoskeletal: Negative. Negative for back pain and neck pain. Skin: Negative. Negative for rash and wound. Neurological: Negative. Negative for dizziness, seizures, syncope and headaches. Psychiatric/Behavioral: The patient is nervous/anxious. All other systems reviewed and are negative. All Other Systems Negative  Physical Exam     Vitals:    06/30/20 1649   BP: (!) 179/96   Pulse: 95   Resp: 16   Temp: 98.3 °F (36.8 °C)   SpO2: 98%   Weight: 70.3 kg (155 lb)   Height: 4' 11\" (1.499 m)     Physical Exam  Vitals signs and nursing note reviewed. Constitutional:       General: She is not in acute distress. Appearance: She is well-developed. She is not diaphoretic. HENT:      Head: Normocephalic and atraumatic. Eyes:      General: No scleral icterus.         Right eye: No discharge. Left eye: No discharge. Conjunctiva/sclera: Conjunctivae normal.   Neck:      Musculoskeletal: Normal range of motion and neck supple. Cardiovascular:      Rate and Rhythm: Normal rate and regular rhythm. Heart sounds: Normal heart sounds. No murmur. No friction rub. No gallop. Pulmonary:      Effort: Pulmonary effort is normal. No respiratory distress. Breath sounds: No stridor. Wheezing present. No rhonchi or rales. Comments: Few mild scattered expiratory wheezes bilaterally. Musculoskeletal: Normal range of motion. Skin:     General: Skin is warm and dry. Findings: No erythema or rash. Neurological:      Mental Status: She is alert and oriented to person, place, and time. Coordination: Coordination normal.      Comments: Gait is steady and patient exhibits no evidence of ataxia. Patient is able to ambulate without difficulty. No focal neurological deficit noted. No facial droop, slurred speech, or evidence of altered mentation noted on exam.      Psychiatric:      Comments: Slightly anxious appearing.                 Diagnostic Study Results     Labs -     Recent Results (from the past 12 hour(s))   EKG, 12 LEAD, INITIAL    Collection Time: 06/30/20  5:08 PM   Result Value Ref Range    Ventricular Rate 71 BPM    Atrial Rate 71 BPM    P-R Interval 138 ms    QRS Duration 132 ms    Q-T Interval 448 ms    QTC Calculation (Bezet) 486 ms    Calculated P Axis 76 degrees    Calculated R Axis -50 degrees    Calculated T Axis 99 degrees    Diagnosis       Normal sinus rhythm  Left axis deviation  Nonspecific intraventricular block  Cannot rule out Anteroseptal infarct , age undetermined  T wave abnormality, consider lateral ischemia  Abnormal ECG  When compared with ECG of 26-JUN-2020 22:01,  No significant change was found     CBC WITH AUTOMATED DIFF    Collection Time: 06/30/20  5:24 PM   Result Value Ref Range    WBC 5.7 4.6 - 13.2 K/uL    RBC 4.33 4.20 - 5.30 M/uL    HGB 12.8 12.0 - 16.0 g/dL    HCT 38.9 35.0 - 45.0 %    MCV 89.8 74.0 - 97.0 FL    MCH 29.6 24.0 - 34.0 PG    MCHC 32.9 31.0 - 37.0 g/dL    RDW 12.3 11.6 - 14.5 %    PLATELET 579 216 - 759 K/uL    MPV 10.6 9.2 - 11.8 FL    NEUTROPHILS 67 40 - 73 %    LYMPHOCYTES 26 21 - 52 %    MONOCYTES 6 3 - 10 %    EOSINOPHILS 1 0 - 5 %    BASOPHILS 0 0 - 2 %    ABS. NEUTROPHILS 3.8 1.8 - 8.0 K/UL    ABS. LYMPHOCYTES 1.5 0.9 - 3.6 K/UL    ABS. MONOCYTES 0.4 0.05 - 1.2 K/UL    ABS. EOSINOPHILS 0.1 0.0 - 0.4 K/UL    ABS. BASOPHILS 0.0 0.0 - 0.1 K/UL    DF AUTOMATED     METABOLIC PANEL, COMPREHENSIVE    Collection Time: 06/30/20  5:24 PM   Result Value Ref Range    Sodium 147 (H) 136 - 145 mmol/L    Potassium 3.8 3.5 - 5.5 mmol/L    Chloride 113 (H) 100 - 111 mmol/L    CO2 30 21 - 32 mmol/L    Anion gap 4 3.0 - 18 mmol/L    Glucose 98 74 - 99 mg/dL    BUN 14 7.0 - 18 MG/DL    Creatinine 1.10 0.6 - 1.3 MG/DL    BUN/Creatinine ratio 13 12 - 20      GFR est AA >60 >60 ml/min/1.73m2    GFR est non-AA 52 (L) >60 ml/min/1.73m2    Calcium 8.7 8.5 - 10.1 MG/DL    Bilirubin, total 0.4 0.2 - 1.0 MG/DL    ALT (SGPT) 16 13 - 56 U/L    AST (SGOT) 12 10 - 38 U/L    Alk.  phosphatase 81 45 - 117 U/L    Protein, total 7.1 6.4 - 8.2 g/dL    Albumin 3.7 3.4 - 5.0 g/dL    Globulin 3.4 2.0 - 4.0 g/dL    A-G Ratio 1.1 0.8 - 1.7     TROPONIN I    Collection Time: 06/30/20  5:24 PM   Result Value Ref Range    Troponin-I, QT <0.02 0.0 - 0.045 NG/ML   MAGNESIUM    Collection Time: 06/30/20  5:24 PM   Result Value Ref Range    Magnesium 2.1 1.6 - 2.6 mg/dL   URINALYSIS W/ RFLX MICROSCOPIC    Collection Time: 06/30/20  7:11 PM   Result Value Ref Range    Color YELLOW      Appearance CLOUDY      Specific gravity 1.022 1.005 - 1.030      pH (UA) 5.0 5.0 - 8.0      Protein TRACE (A) NEG mg/dL    Glucose 100 (A) NEG mg/dL    Ketone Negative NEG mg/dL    Bilirubin Negative NEG      Blood Negative NEG      Urobilinogen 0.2 0.2 - 1.0 EU/dL    Nitrites Negative NEG      Leukocyte Esterase Negative NEG     DRUG SCREEN, URINE    Collection Time: 06/30/20  7:11 PM   Result Value Ref Range    BENZODIAZEPINES Negative NEG      BARBITURATES Negative NEG      THC (TH-CANNABINOL) Negative NEG      OPIATES Negative NEG      PCP(PHENCYCLIDINE) Negative NEG      COCAINE Positive (A) NEG      AMPHETAMINES Negative NEG      METHADONE Negative NEG      HDSCOM (NOTE)    URINE MICROSCOPIC ONLY    Collection Time: 06/30/20  7:11 PM   Result Value Ref Range    WBC 0 to 3 0 - 4 /hpf    RBC Negative 0 - 5 /hpf    Epithelial cells 4+ 0 - 5 /lpf    Bacteria FEW (A) NEG /hpf    Granular cast 3 to 5 NEG /lpf       Radiologic Studies -   XR CHEST PORT    (Results Pending)     CT Results  (Last 48 hours)    None        CXR Results  (Last 48 hours)    None            Medical Decision Making   I am the first provider for this patient. I reviewed the vital signs, available nursing notes, past medical history, past surgical history, family history and social history. Vital Signs-Reviewed the patient's vital signs. Records Reviewed: Vangie Edwards PA-C     Procedures:  Procedures    Provider Notes (Medical Decision Making): Impression:  Chest pain unspec, asthma exacerbation, anxiety    Single dose of xanax and prednisone given in the ED    Labs unremarkable  Chest x-ray negative for acute process  EKG shows no acute changes, reviewed by myself and the ED attending. Clinical presentation suggestive of anxiety, asthma exacerbation, and unspec chest pain, no indication for repeat troponin (sx present for a few days)  will plan to d/c with albuterol and prednisone with pcp follow-up. Pt agrees. Vangie Edwards PA-C       MED RECONCILIATION:  No current facility-administered medications for this encounter.       Current Outpatient Medications   Medication Sig    predniSONE (STERAPRED DS) 10 mg dose pack Use as directed, start on 7/1/2020    albuterol-ipratropium (DUO-NEB) 2.5 mg-0.5 mg/3 ml nebu 3 mL by Nebulization route every four (4) hours as needed for Wheezing.  cloNIDine HCL (CATAPRES) 0.2 mg tablet Take 1 Tab by mouth two (2) times a day.  albuterol (PROVENTIL HFA, VENTOLIN HFA, PROAIR HFA) 90 mcg/actuation inhaler Take 2 Puffs by inhalation every four (4) hours as needed for Wheezing.  amLODIPine (NORVASC) 10 mg tablet Take 1 Tab by mouth daily.  ALPRAZolam (Xanax) 0.5 mg tablet Take 1 Tab by mouth every eight (8) hours as needed for Anxiety. Max Daily Amount: 1.5 mg.    albuterol (PROVENTIL HFA, VENTOLIN HFA, PROAIR HFA) 90 mcg/actuation inhaler Take 2 Puffs by inhalation every four (4) hours as needed for Wheezing.  aspirin delayed-release 81 mg tablet Take 1 Tab by mouth daily.  ondansetron (ZOFRAN ODT) 4 mg disintegrating tablet Take 1 Tab by mouth every eight (8) hours as needed for Nausea. Indications: prevent nausea and vomiting after surgery    famotidine (PEPCID) 20 mg tablet Take 1 Tab by mouth daily.  OXYGEN-AIR DELIVERY SYSTEMS 3 L by IntraNASal route as needed for Other (sob).  docusate sodium (COLACE) 100 mg capsule Take 1 Cap by mouth two (2) times a day.  fluticasone furoate-vilanterol (BREO ELLIPTA) 200-25 mcg/dose inhaler Take 1 Puff by inhalation daily.  arformoterol (BROVANA) 15 mcg/2 mL nebu neb solution 2 mL by Nebulization route two (2) times a day.  budesonide (PULMICORT) 0.5 mg/2 mL nbsp 2 mL by Nebulization route two (2) times a day.  tiotropium (SPIRIVA) 18 mcg inhalation capsule Take 1 Cap by inhalation daily.  atorvastatin (LIPITOR) 20 mg tablet Take 1 Tab by mouth nightly.  escitalopram oxalate (LEXAPRO) 10 mg tablet Take 1 Tab by mouth every evening.  metoprolol tartrate (LOPRESSOR) 25 mg tablet Take 0.5 Tabs by mouth every twelve (12) hours.  roflumilast (DALIRESP) 500 mcg tab tablet Take 1 Tab by mouth daily.     OTHER Incentive spirometry- use as directed    naloxone (NARCAN) 4 mg/actuation nasal spray Use 1 spray intranasally, then discard. Repeat with new spray every 2 min as needed for opioid overdose symptoms, alternating nostrils.  montelukast (SINGULAIR) 10 mg tablet Take 1 Tab by mouth nightly. Disposition:  D/c    DISCHARGE NOTE:   Patient is stable for discharge at this time. I have discussed all the findings from today's work up with the patient, including lab results and imaging. I have answered all questions. Rx for albuterol and prednisone given. Rest and close follow-up with the PCP recommended this week. Return to the ED immediately for any new or worsening symptoms. Vangie Edwards PA-C     Follow-up Information     Follow up With Specialties Details Why Contact Info    Rob Buerger, NP Nurse Practitioner Schedule an appointment as soon as possible for a visit in 2 days  [address] [facility] [address]  [phone]      [facility] EMERGENCY DEPT Emergency Medicine  As needed, If symptoms worsen [address] [facility] [address]  [phone]          Discharge Medication List as of 6/30/2020  8:07 PM      START taking these medications    Details   predniSONE (STERAPRED DS) 10 mg dose pack Use as directed, start on 7/1/2020, Print, Disp-21 Tab, R-0         CONTINUE these medications which have NOT CHANGED    Details   cloNIDine HCL (CATAPRES) 0.2 mg tablet Take 1 Tab by mouth two (2) times a day., Normal, Disp-30 Tab, R-1      !! albuterol (PROVENTIL HFA, VENTOLIN HFA, PROAIR HFA) 90 mcg/actuation inhaler Take 2 Puffs by inhalation every four (4) hours as needed for Wheezing., Normal, Disp-1 Inhaler, R-0      amLODIPine (NORVASC) 10 mg tablet Take 1 Tab by mouth daily. , Normal, Disp-15 Tab, R-0      ALPRAZolam (Xanax) 0.5 mg tablet Take 1 Tab by mouth every eight (8) hours as needed for Anxiety.  Max Daily Amount: 1.5 mg., Print, Disp-20 Tab, R-0      !! albuterol (PROVENTIL HFA, VENTOLIN HFA, PROAIR HFA) 90 mcg/actuation inhaler Take 2 Puffs by inhalation every four (4) hours as needed for Wheezing., Normal, Disp-1 Inhaler, R-0      aspirin delayed-release 81 mg tablet Take 1 Tab by mouth daily. , Print, Disp-30 Tab, R-0      ondansetron (ZOFRAN ODT) 4 mg disintegrating tablet Take 1 Tab by mouth every eight (8) hours as needed for Nausea. Indications: prevent nausea and vomiting after surgery, Print, Disp-20 Tab, R-0      famotidine (PEPCID) 20 mg tablet Take 1 Tab by mouth daily. , Print, Disp-30 Tab, R-1      OXYGEN-AIR DELIVERY SYSTEMS 3 L by IntraNASal route as needed for Other (sob). , Historical Med      docusate sodium (COLACE) 100 mg capsule Take 1 Cap by mouth two (2) times a day., Normal, Disp-120 Cap, R-0      fluticasone furoate-vilanterol (BREO ELLIPTA) 200-25 mcg/dose inhaler Take 1 Puff by inhalation daily. , Historical Med      arformoterol (BROVANA) 15 mcg/2 mL nebu neb solution 2 mL by Nebulization route two (2) times a day., Print, Disp-60 Vial, R-0      budesonide (PULMICORT) 0.5 mg/2 mL nbsp 2 mL by Nebulization route two (2) times a day., Print, Disp-60 Each, R-0      tiotropium (SPIRIVA) 18 mcg inhalation capsule Take 1 Cap by inhalation daily. , Print, Disp-30 Cap, R-0      atorvastatin (LIPITOR) 20 mg tablet Take 1 Tab by mouth nightly. , Print, Disp-30 Tab, R-0      escitalopram oxalate (LEXAPRO) 10 mg tablet Take 1 Tab by mouth every evening., Print, Disp-30 Tab, R-0      metoprolol tartrate (LOPRESSOR) 25 mg tablet Take 0.5 Tabs by mouth every twelve (12) hours. , Print, Disp-30 Tab, R-0      roflumilast (DALIRESP) 500 mcg tab tablet Take 1 Tab by mouth daily. , Print, Disp-30 Tab, R-0      OTHER Incentive spirometry- use as directed, Print, Disp-1 Each, R-0      naloxone (NARCAN) 4 mg/actuation nasal spray Use 1 spray intranasally, then discard. Repeat with new spray every 2 min as needed for opioid overdose symptoms, alternating nostrils. , Print, Disp-1 Each, R-0      montelukast (SINGULAIR) 10 mg tablet Take 1 Tab by mouth nightly. , Print, Disp-30 Tab, R-1       !! - Potential duplicate medications found. Please discuss with provider. Diagnosis     Clinical Impression:   1. Anxiety state    2. Chest pain, unspecified type    3.  COPD exacerbation ([address]

## 2020-07-01 ENCOUNTER — PATIENT OUTREACH (OUTPATIENT)
Dept: CASE MANAGEMENT | Age: 55
End: 2020-07-01

## 2020-07-01 LAB
ATRIAL RATE: 71 BPM
CALCULATED P AXIS, ECG09: 76 DEGREES
CALCULATED R AXIS, ECG10: -50 DEGREES
CALCULATED T AXIS, ECG11: 99 DEGREES
DIAGNOSIS, 93000: NORMAL
P-R INTERVAL, ECG05: 138 MS
Q-T INTERVAL, ECG07: 448 MS
QRS DURATION, ECG06: 132 MS
QTC CALCULATION (BEZET), ECG08: 486 MS
VENTRICULAR RATE, ECG03: 71 BPM

## 2020-07-01 NOTE — DISCHARGE INSTRUCTIONS
Patient Education        Chronic Obstructive Pulmonary Disease (COPD): Care Instructions  Your Care Instructions     Chronic obstructive pulmonary disease (COPD) is a general term for a group of lung diseases, including emphysema and chronic bronchitis. People with COPD have decreased airflow in and out of the lungs, which makes it hard to breathe. The airways also can get clogged with thick mucus. Cigarette smoking is a major cause of COPD. Although there is no cure for COPD, you can slow its progress. Following your treatment plan and taking care of yourself can help you feel better and live longer. Follow-up care is a key part of your treatment and safety. Be sure to make and go to all appointments, and call your doctor if you are having problems. It's also a good idea to know your test results and keep a list of the medicines you take. How can you care for yourself at home? Staying healthy  · Do not smoke. This is the most important step you can take to prevent more damage to your lungs. If you need help quitting, talk to your doctor about stop-smoking programs and medicines. These can increase your chances of quitting for good. · Avoid colds and flu. Get a pneumococcal vaccine shot. If you have had one before, ask your doctor whether you need a second dose. Get the flu vaccine every fall. If you must be around people with colds or the flu, wash your hands often. · Avoid secondhand smoke, air pollution, and high altitudes. Also avoid cold, dry air and hot, humid air. Stay at home with your windows closed when air pollution is bad. Medicines and oxygen therapy  · Take your medicines exactly as prescribed. Call your doctor if you think you are having a problem with your medicine. You may be taking medicines such as:  ? Bronchodilators. These help open your airways and make breathing easier. They are either short-acting (work for 6 to 9 hours) or long-acting (work for 24 hours).  You inhale most bronchodilators, so they start to act quickly. Always carry your quick-relief inhaler with you in case you need it while you are away from home. ? Corticosteroids (prednisone, budesonide). These reduce airway inflammation. They come in pill or inhaled form. You must take these medicines every day for them to work well. · Ask your doctor or pharmacist if a spacer is right for you. A spacer may help you get more inhaled medicine to your lungs. If you use one, ask how to use it properly. · Do not take any vitamins, over-the-counter medicine, or herbal products without talking to your doctor first.  · If your doctor prescribed antibiotics, take them as directed. Do not stop taking them just because you feel better. You need to take the full course of antibiotics. · If you use oxygen therapy, use the flow rate your doctor has recommended. Don't change it without talking to your doctor first. Oxygen therapy boosts the amount of oxygen in your blood and helps you breathe easier. Activity  · Get regular exercise. Walking is an easy way to get exercise. Start out slowly, and walk a little more each day. · Pay attention to your breathing. You are exercising too hard if you can't talk while you exercise. · Take short rest breaks when doing household chores and other activities. · Learn breathing methods--such as breathing through pursed lips--to help you become less short of breath. · If your doctor has not set you up with a pulmonary rehabilitation program, ask if rehab is right for you. Rehab includes exercise programs, education about your disease and how to manage it, help with diet and other changes, and emotional support. Diet  · Eat regular, healthy meals. Use bronchodilators about 1 hour before you eat to make it easier to eat. Eat several small meals instead of three large ones. Drink beverages at the end of the meal. Avoid foods that are hard to chew.   · Eat foods that contain protein so you don't lose muscle mass. · Talk with your doctor if you gain too much weight or if you lose weight without trying. Mental health  · Talk to your family, friends, or a therapist about your feelings. Some people feel frightened, angry, hopeless, helpless, and even guilty. Talking openly about bad feelings can help you cope. If these feelings last, talk to your doctor. When should you call for help? [id number] anytime you think you may need emergency care. For example, call if:  · You have severe trouble breathing. Call your doctor now or seek immediate medical care if:  · You have new or worse trouble breathing. · You cough up blood. · You have a fever. Watch closely for changes in your health, and be sure to contact your doctor if:  · You cough more deeply or more often, especially if you notice more mucus or a change in the color of your mucus. · You have new or worse swelling in your legs or belly. · You are not getting better as expected. Where can you learn more? Go to http://magda-kadie.info/  Enter G410 in the search box to learn more about \"Chronic Obstructive Pulmonary Disease (COPD): Care Instructions. \"  Current as of: February 24, 2020               Content Version: 12.5  © 2006-2020 Healthwise, Incorporated. Care instructions adapted under license by [employer] (which disclaims liability or warranty for this information). If you have questions about a medical condition or this instruction, always ask your healthcare professional. [place] [address] any warranty or liability for your use of this information. Patient Education        Anxiety Disorder: Care Instructions  Your Care Instructions     Anxiety is a normal reaction to stress. Difficult situations can cause you to have symptoms such as sweaty palms and a nervous feeling. In an anxiety disorder, the symptoms are far more severe.  Constant worry, muscle tension, trouble sleeping, nausea and diarrhea, and other symptoms can make normal daily activities difficult or impossible. These symptoms may occur for no reason, and they can affect your work, school, or social life. Medicines, counseling, and self-care can all help. Follow-up care is a key part of your treatment and safety. Be sure to make and go to all appointments, and call your doctor if you are having problems. It's also a good idea to know your test results and keep a list of the medicines you take. How can you care for yourself at home? · Take medicines exactly as directed. Call your doctor if you think you are having a problem with your medicine. · Go to your counseling sessions and follow-up appointments. · Recognize and accept your anxiety. Then, when you are in a situation that makes you anxious, say to yourself, \"This is not an emergency. I feel uncomfortable, but I am not in danger. I can keep going even if I feel anxious. \"  · Be kind to your body:  ? Relieve tension with exercise or a massage. ? Get enough rest.  ? Avoid alcohol, caffeine, nicotine, and illegal drugs. They can increase your anxiety level and cause sleep problems. ? Learn and do relaxation techniques. See below for more about these techniques. · Engage your mind. Get out and do something you enjoy. Go to a funny movie, or take a walk or hike. Plan your day. Having too much or too little to do can make you anxious. · Keep a record of your symptoms. Discuss your fears with a good friend or family member, or join a support group for people with similar problems. Talking to others sometimes relieves stress. · Get involved in social groups, or volunteer to help others. Being alone sometimes makes things seem worse than they are. · Get at least 30 minutes of exercise on most days of the week to relieve stress. Walking is a good choice. You also may want to do other activities, such as running, swimming, cycling, or playing tennis or team sports.   Relaxation techniques  Do relaxation exercises 10 to 20 minutes a day. You can play soothing, relaxing music while you do them, if you wish. · Tell others in your house that you are going to do your relaxation exercises. Ask them not to disturb you. · Find a comfortable place, away from all distractions and noise. · Lie down on your back, or sit with your back straight. · Focus on your breathing. Make it slow and steady. · Breathe in through your nose. Breathe out through either your nose or mouth. · Breathe deeply, filling up the area between your navel and your rib cage. Breathe so that your belly goes up and down. · Do not hold your breath. · Breathe like this for 5 to 10 minutes. Notice the feeling of calmness throughout your whole body. As you continue to breathe slowly and deeply, relax by doing the following for another 5 to 10 minutes:  · Tighten and relax each muscle group in your body. You can begin at your toes and work your way up to your head. · Imagine your muscle groups relaxing and becoming heavy. · Empty your mind of all thoughts. · Let yourself relax more and more deeply. · Become aware of the state of calmness that surrounds you. · When your relaxation time is over, you can bring yourself back to alertness by moving your fingers and toes and then your hands and feet and then stretching and moving your entire body. Sometimes people fall asleep during relaxation, but they usually wake up shortly afterward. · Always give yourself time to return to full alertness before you drive a car or do anything that might cause an accident if you are not fully alert. Never play a relaxation tape while you drive a car. When should you call for help? [id number] anytime you think you may need emergency care. For example, call if:  · You feel you cannot stop from hurting yourself or someone else.   Keep the numbers for these national suicide hotlines: 8-448-031-TALK (1-495.536.2774) and 3-986-NQYEDSE (1-254.856.5700). If you or someone you know talks about suicide or feeling hopeless, get help right away. Watch closely for changes in your health, and be sure to contact your doctor if:  · You have anxiety or fear that affects your life. · You have symptoms of anxiety that are new or different from those you had before. Where can you learn more? Go to http://magda-kadie.info/  Enter P754 in the search box to learn more about \"Anxiety Disorder: Care Instructions. \"  Current as of: 2020               Content Version: 12.5  © 6423-8459 [employer]. Care instructions adapted under license by [employer] (which disclaims liability or warranty for this information). If you have questions about a medical condition or this instruction, always ask your healthcare professional. [address] any warranty or liability for your use of this information. [email or web address] Activation    Thank you for requesting access to aisle411. Please follow the instructions below to securely access and download your online medical record. [email or web address] allows you to send messages to your doctor, view your test results, renew your prescriptions, schedule appointments, and more. How Do I Sign Up? 1. In your internet browser, go to [email or web address]  2. Click on the First Time User? Click Here link in the Sign In box. You will be redirect to the New Member Sign Up page. 3. Enter your aisle411 Access Code exactly as it appears below. You will not need to use this code after youve completed the sign-up process. If you do not sign up before the expiration date, you must request a new code. [email or web address] Access Code: [email or web address]  Expires: 2020  5:00 PM (This is the date your aisle411 access code will )    4. Enter the last four digits of your Social Security Number (xxxx) and Date of Birth (mm/dd/yyyy) as indicated and click Submit.  You will be taken to the next sign-up page. 5. Create a Artillery ID. This will be your Artillery login ID and cannot be changed, so think of one that is secure and easy to remember. 6. Create a Artillery password. You can change your password at any time. 7. Enter your Password Reset Question and Answer. This can be used at a later time if you forget your password. 8. Enter your e-mail address. You will receive e-mail notification when new information is available in [address]. 9. Click Sign Up. You can now view and download portions of your medical record. 10. Click the Download Summary menu link to download a portable copy of your medical information. Additional Information    If you have questions, please visit the Frequently Asked Questions section of the Artillery website at [email or web address] Remember, Artillery is NOT to be used for urgent needs. For medical emergencies, dial 911.

## 2020-07-02 ENCOUNTER — HOSPITAL ENCOUNTER (EMERGENCY)
Age: 55
Discharge: HOME | End: 2020-07-02
Attending: EMERGENCY MEDICINE
Payer: MEDICAID

## 2020-07-02 ENCOUNTER — PATIENT OUTREACH (OUTPATIENT)
Dept: CASE MANAGEMENT | Age: 55
End: 2020-07-02

## 2020-07-02 ENCOUNTER — APPOINTMENT (OUTPATIENT)
Dept: GENERAL RADIOLOGY | Age: 55
End: 2020-07-02
Attending: EMERGENCY MEDICINE
Payer: MEDICAID

## 2020-07-02 VITALS
DIASTOLIC BLOOD PRESSURE: 122 MMHG | OXYGEN SATURATION: 98 % | RESPIRATION RATE: 18 BRPM | HEART RATE: 75 BPM | TEMPERATURE: 98 F | SYSTOLIC BLOOD PRESSURE: 205 MMHG

## 2020-07-02 DIAGNOSIS — R06.02 SOB (SHORTNESS OF BREATH): ICD-10-CM

## 2020-07-02 DIAGNOSIS — R07.9 CHEST PAIN, UNSPECIFIED TYPE: Primary | ICD-10-CM

## 2020-07-02 LAB
ALBUMIN SERPL-MCNC: 3.8 G/DL (ref 3.4–5)
ALBUMIN/GLOB SERPL: 1.1 {RATIO} (ref 0.8–1.7)
ALP SERPL-CCNC: 79 U/L (ref 45–117)
ALT SERPL-CCNC: 24 U/L (ref 13–56)
ANION GAP SERPL CALC-SCNC: 5 MMOL/L (ref 3–18)
AST SERPL-CCNC: 11 U/L (ref 10–38)
ATRIAL RATE: 86 BPM
BACTERIA SPEC CULT: NORMAL
BASOPHILS # BLD: 0 K/UL (ref 0–0.1)
BASOPHILS NFR BLD: 0 % (ref 0–2)
BILIRUB SERPL-MCNC: 0.3 MG/DL (ref 0.2–1)
BUN SERPL-MCNC: 13 MG/DL (ref 7–18)
BUN/CREAT SERPL: 12 (ref 12–20)
CALCIUM SERPL-MCNC: 9 MG/DL (ref 8.5–10.1)
CALCULATED P AXIS, ECG09: 76 DEGREES
CALCULATED R AXIS, ECG10: -6 DEGREES
CALCULATED T AXIS, ECG11: 112 DEGREES
CC UR VC: NORMAL
CHLORIDE SERPL-SCNC: 104 MMOL/L (ref 100–111)
CK MB CFR SERPL CALC: 1.3 % (ref 0–4)
CK MB CFR SERPL CALC: 1.5 % (ref 0–4)
CK MB SERPL-MCNC: 1.2 NG/ML (ref 5–25)
CK MB SERPL-MCNC: 1.2 NG/ML (ref 5–25)
CK SERPL-CCNC: 80 U/L (ref 26–192)
CK SERPL-CCNC: 90 U/L (ref 26–192)
CO2 SERPL-SCNC: 32 MMOL/L (ref 21–32)
CREAT SERPL-MCNC: 1.08 MG/DL (ref 0.6–1.3)
D DIMER PPP FEU-MCNC: <0.27 UG/ML(FEU)
DIAGNOSIS, 93000: NORMAL
DIFFERENTIAL METHOD BLD: NORMAL
EOSINOPHIL # BLD: 0.1 K/UL (ref 0–0.4)
EOSINOPHIL NFR BLD: 1 % (ref 0–5)
ERYTHROCYTE [DISTWIDTH] IN BLOOD BY AUTOMATED COUNT: 12.5 % (ref 11.6–14.5)
GLOBULIN SER CALC-MCNC: 3.4 G/DL (ref 2–4)
GLUCOSE SERPL-MCNC: 122 MG/DL (ref 74–99)
HCT VFR BLD AUTO: 39.8 % (ref 35–45)
HGB BLD-MCNC: 13.3 G/DL (ref 12–16)
LYMPHOCYTES # BLD: 2.7 K/UL (ref 0.9–3.6)
LYMPHOCYTES NFR BLD: 35 % (ref 21–52)
MAGNESIUM SERPL-MCNC: 2 MG/DL (ref 1.6–2.6)
MCH RBC QN AUTO: 29.6 PG (ref 24–34)
MCHC RBC AUTO-ENTMCNC: 33.4 G/DL (ref 31–37)
MCV RBC AUTO: 88.4 FL (ref 74–97)
MONOCYTES # BLD: 0.4 K/UL (ref 0.05–1.2)
MONOCYTES NFR BLD: 5 % (ref 3–10)
NEUTS SEG # BLD: 4.5 K/UL (ref 1.8–8)
NEUTS SEG NFR BLD: 59 % (ref 40–73)
P-R INTERVAL, ECG05: 128 MS
PLATELET # BLD AUTO: 253 K/UL (ref 135–420)
PMV BLD AUTO: 10.2 FL (ref 9.2–11.8)
POTASSIUM SERPL-SCNC: 2.9 MMOL/L (ref 3.5–5.5)
PROT SERPL-MCNC: 7.2 G/DL (ref 6.4–8.2)
Q-T INTERVAL, ECG07: 410 MS
QRS DURATION, ECG06: 136 MS
QTC CALCULATION (BEZET), ECG08: 490 MS
RBC # BLD AUTO: 4.5 M/UL (ref 4.2–5.3)
SERVICE CMNT-IMP: NORMAL
SODIUM SERPL-SCNC: 141 MMOL/L (ref 136–145)
TROPONIN I SERPL-MCNC: <0.02 NG/ML (ref 0–0.04)
TROPONIN I SERPL-MCNC: <0.02 NG/ML (ref 0–0.04)
VENTRICULAR RATE, ECG03: 86 BPM
WBC # BLD AUTO: 7.8 K/UL (ref 4.6–13.2)

## 2020-07-02 PROCEDURE — 93005 ELECTROCARDIOGRAM TRACING: CPT

## 2020-07-02 PROCEDURE — 85025 COMPLETE CBC W/AUTO DIFF WBC: CPT

## 2020-07-02 PROCEDURE — 96374 THER/PROPH/DIAG INJ IV PUSH: CPT

## 2020-07-02 PROCEDURE — 99285 EMERGENCY DEPT VISIT HI MDM: CPT

## 2020-07-02 PROCEDURE — 82550 ASSAY OF CK (CPK): CPT

## 2020-07-02 PROCEDURE — 74011250636 HC RX REV CODE- 250/636: Performed by: EMERGENCY MEDICINE

## 2020-07-02 PROCEDURE — 85379 FIBRIN DEGRADATION QUANT: CPT

## 2020-07-02 PROCEDURE — 71045 X-RAY EXAM CHEST 1 VIEW: CPT

## 2020-07-02 PROCEDURE — 74011250637 HC RX REV CODE- 250/637: Performed by: EMERGENCY MEDICINE

## 2020-07-02 PROCEDURE — 80053 COMPREHEN METABOLIC PANEL: CPT

## 2020-07-02 PROCEDURE — 83735 ASSAY OF MAGNESIUM: CPT

## 2020-07-02 RX ORDER — ALPRAZOLAM 0.25 MG/1
0.5 TABLET ORAL
Status: COMPLETED | OUTPATIENT
Start: 2020-07-02 | End: 2020-07-02

## 2020-07-02 RX ORDER — MORPHINE SULFATE 2 MG/ML
4 INJECTION, SOLUTION INTRAMUSCULAR; INTRAVENOUS
Status: COMPLETED | OUTPATIENT
Start: 2020-07-02 | End: 2020-07-02

## 2020-07-02 RX ORDER — CLONIDINE HYDROCHLORIDE 0.1 MG/1
0.1 TABLET ORAL
Status: COMPLETED | OUTPATIENT
Start: 2020-07-02 | End: 2020-07-02

## 2020-07-02 RX ADMIN — ALPRAZOLAM 0.5 MG: 0.25 TABLET ORAL at 16:34

## 2020-07-02 RX ADMIN — CLONIDINE HYDROCHLORIDE 0.1 MG: 0.1 TABLET ORAL at 16:34

## 2020-07-02 RX ADMIN — MORPHINE SULFATE 4 MG: 2 INJECTION, SOLUTION INTRAMUSCULAR; INTRAVENOUS at 14:53

## 2020-07-02 NOTE — DISCHARGE INSTRUCTIONS
SPECIFIC PATIENT INSTRUCTIONS FROM THE EMERGENCY PHYSICIAN WHO TREATED YOU TODAY:  1. Return if worse. 2. Follow up with your primary doctor or your cardiologist (if you have one) in the next 2-4 days for reevaluation. Patient Education        Chest Pain: Care Instructions  Your Care Instructions     There are many things that can cause chest pain. Some are not serious and will get better on their own in a few days. But some kinds of chest pain need more testing and treatment. Your doctor may have recommended a follow-up visit in the next 8 to 12 hours. If you are not getting better, you may need more tests or treatment. Even though your doctor has released you, you still need to watch for any problems. The doctor carefully checked you, but sometimes problems can develop later. If you have new symptoms or if your symptoms do not get better, get medical care right away. If you have worse or different chest pain or pressure that lasts more than 5 minutes or you passed out (lost consciousness), [id number] or seek other emergency help right away. A medical visit is only one step in your treatment. Even if you feel better, you still need to do what your doctor recommends, such as going to all suggested follow-up appointments and taking medicines exactly as directed. This will help you recover and help prevent future problems. How can you care for yourself at home? · Rest until you feel better. · Take your medicine exactly as prescribed. Call your doctor if you think you are having a problem with your medicine. · Do not drive after taking a prescription pain medicine. When should you call for help? [id number]:   · You passed out (lost consciousness). · You have severe difficulty breathing. · You have symptoms of a heart attack. These may include:  ? Chest pain or pressure, or a strange feeling in your chest.  ? Sweating. ? Shortness of breath. ? Nausea or vomiting.   ? Pain, pressure, or a strange feeling in your back, neck, jaw, or upper belly or in one or both shoulders or arms. ? Lightheadedness or sudden weakness. ? A fast or irregular heartbeat. After you call 911, the  may tell you to chew 1 adult-strength or 2 to 4 low-dose aspirin. Wait for an ambulance. Do not try to drive yourself. Call your doctor today if:   · You have any trouble breathing. · Your chest pain gets worse. · You are dizzy or lightheaded, or you feel like you may faint. · You are not getting better as expected. · You are having new or different chest pain. Where can you learn more? Go to http://www.ash.com/  Enter A120 in the search box to learn more about \"Chest Pain: Care Instructions. \"  Current as of: June 26, 2019               Content Version: 12.5  © 1223-7725 [email or web address]. Care instructions adapted under license by [employer] (which disclaims liability or warranty for this information). If you have questions about a medical condition or this instruction, always ask your healthcare professional. [place] [address] any warranty or liability for your use of this information. Patient Education        Shortness of Breath: Care Instructions  Your Care Instructions  Shortness of breath has many causes. Sometimes conditions such as anxiety can lead to shortness of breath. Some people get mild shortness of breath when they exercise. Trouble breathing also can be a symptom of a serious problem, such as asthma, lung disease, emphysema, heart problems, and pneumonia. If your shortness of breath continues, you may need tests and treatment. Watch for any changes in your breathing and other symptoms. Follow-up care is a key part of your treatment and safety. Be sure to make and go to all appointments, and call your doctor if you are having problems. It's also a good idea to know your test results and keep a list of the medicines you take.   How can you care for yourself at home? · Do not smoke or allow others to smoke around you. If you need help quitting, talk to your doctor about stop-smoking programs and medicines. These can increase your chances of quitting for good. · Get plenty of rest and sleep. · Take your medicines exactly as prescribed. Call your doctor if you think you are having a problem with your medicine. · Find healthy ways to deal with stress. ? Exercise daily. ? Get plenty of sleep. ? Eat regularly and well. When should you call for help? [id number] anytime you think you may need emergency care. For example, call if:  · You have severe shortness of breath. · You have symptoms of a heart attack. These may include:  ? Chest pain or pressure, or a strange feeling in the chest.  ? Sweating. ? Shortness of breath. ? Nausea or vomiting. ? Pain, pressure, or a strange feeling in the back, neck, jaw, or upper belly or in one or both shoulders or arms. ? Lightheadedness or sudden weakness. ? A fast or irregular heartbeat. After you call 911, the  may tell you to chew 1 adult-strength or 2 to 4 low-dose aspirin. Wait for an ambulance. Do not try to drive yourself. Call your doctor now or seek immediate medical care if:  · Your shortness of breath gets worse or you start to wheeze. Wheezing is a high-pitched sound when you breathe. · You wake up at night out of breath or have to prop your head up on several pillows to breathe. · You are short of breath after only light activity or while at rest.  Watch closely for changes in your health, and be sure to contact your doctor if:  · You do not get better over the next 1 to 2 days. Where can you learn more? Go to http://magda-kadie.info/  Enter S780 in the search box to learn more about \"Shortness of Breath: Care Instructions. \"  Current as of: February 24, 2020               Content Version: 12.5  © 3531-9060 Healthwise, Incorporated.    Care instructions adapted under license by [address] (which disclaims liability or warranty for this information). If you have questions about a medical condition or this instruction, always ask your healthcare professional. [address] any warranty or liability for your use of this information. Lumiary Activation    Thank you for requesting access to Lumiary. Please follow the instructions below to securely access and download your online medical record. Lumiary allows you to send messages to your doctor, view your test results, renew your prescriptions, schedule appointments, and more. How Do I Sign Up? In your internet browser, go to [email or web address]. Click on the First Time User? Click Here link in the Sign In box. You will see the New Member Sign Up page. Enter your Lumiary Access Code exactly as it appears below. You will not need to use this code after you´ve completed the sign-up process. If you do not sign up before the expiration date, you must request a new code. Lumiary Access Code: [email or web address]  Expires: 3/28/2019  2:27 PM (This is the date your Lumiary access code will )    Enter the last four digits of your Social Security Number (xxxx) and Date of Birth (mm/dd/yyyy) as indicated and click Submit. You will be taken to the next sign-up page. Create a Lumiary ID. This will be your Lumiary login ID and cannot be changed, so think of one that is secure and easy to remember. Create a Lumiary password. You can change your password at any time. Enter your Password Reset Question and Answer. This can be used at a later time if you forget your password. Enter your e-mail address. You will receive e-mail notification when new information is available in [address] Click Sign Up. You can now view and download portions of your medical record. Click the [place] link to download a portable copy of your medical information.     Additional Information    If you have questions, please visit the Frequently Asked Questions section of the Soxiable website at [email or web address] Remember, Soxiable is NOT to be used for urgent needs. For medical emergencies, dial 911.

## 2020-07-02 NOTE — ED TRIAGE NOTES
EMS reports chest pain and SOB; stressed x 1 week; buried mother and brother in one week. 's/ 130's; HR ; Adm 2 nitro and  4 BASA; chest pain remains 10/10.

## 2020-07-02 NOTE — ED PROVIDER NOTES
[employer] [facility] EMERGENCY DEPT      1:35 PM    Date: 7/2/2020  Patient Name: Narciso Rush    History of Presenting Illness     Chief Complaint   Patient presents with    Chest Pain    Shortness of Breath       53 y.o. female with noted past medical history who presents to the emergency department with chest pain and shortness of breath. The patient states that over the last 2 months she is had some intermittent chest pressure the last about 30 minutes each time before spontaneous resolution. With that chest pressure she has associated shortness of breath. She states she had a prior recent heart attack 2 months ago and has a history of COPD. With the chest pain and shortness breath she symptoms get some left arm numbness as well. She denies any diaphoresis or palpitations fever or chills. No URI symptoms. Patient states that about an hour prior to arrival she was going to [address] [place] [address] to get her prescription filled for her albuterol inhaler when she got a fairly sudden onset of chest discomfort described as a pressure in the substernal mid chest that has continued to the present over the last hour. With that she is had some associated sensation of shortness of breath. No other associated complaints at this time. Patient denies any other associated signs or symptoms. Patient denies any other complaints. Nursing notes regarding the HPI and triage nursing notes were reviewed. Prior medical records were reviewed. Current Outpatient Medications   Medication Sig Dispense Refill    predniSONE (STERAPRED DS) 10 mg dose pack Use as directed, start on 7/1/2020 21 Tab 0    albuterol-ipratropium (DUO-NEB) 2.5 mg-0.5 mg/3 ml nebu 3 mL by Nebulization route every four (4) hours as needed for Wheezing. 30 Nebule 0    cloNIDine HCL (CATAPRES) 0.2 mg tablet Take 1 Tab by mouth two (2) times a day.  30 Tab 1    albuterol (PROVENTIL HFA, VENTOLIN HFA, PROAIR HFA) 90 mcg/actuation inhaler Take 2 Puffs by inhalation every four (4) hours as needed for Wheezing. 1 Inhaler 0    amLODIPine (NORVASC) 10 mg tablet Take 1 Tab by mouth daily. 15 Tab 0    ALPRAZolam (Xanax) 0.5 mg tablet Take 1 Tab by mouth every eight (8) hours as needed for Anxiety. Max Daily Amount: 1.5 mg. 20 Tab 0    albuterol (PROVENTIL HFA, VENTOLIN HFA, PROAIR HFA) 90 mcg/actuation inhaler Take 2 Puffs by inhalation every four (4) hours as needed for Wheezing. 1 Inhaler 0    aspirin delayed-release 81 mg tablet Take 1 Tab by mouth daily. 30 Tab 0    ondansetron (ZOFRAN ODT) 4 mg disintegrating tablet Take 1 Tab by mouth every eight (8) hours as needed for Nausea. Indications: prevent nausea and vomiting after surgery 20 Tab 0    famotidine (PEPCID) 20 mg tablet Take 1 Tab by mouth daily. 30 Tab 1    OXYGEN-AIR DELIVERY SYSTEMS 3 L by IntraNASal route as needed for Other (sob).  docusate sodium (COLACE) 100 mg capsule Take 1 Cap by mouth two (2) times a day. 120 Cap 0    fluticasone furoate-vilanterol (BREO ELLIPTA) 200-25 mcg/dose inhaler Take 1 Puff by inhalation daily.  arformoterol (BROVANA) 15 mcg/2 mL nebu neb solution 2 mL by Nebulization route two (2) times a day. 60 Vial 0    budesonide (PULMICORT) 0.5 mg/2 mL nbsp 2 mL by Nebulization route two (2) times a day. 60 Each 0    tiotropium (SPIRIVA) 18 mcg inhalation capsule Take 1 Cap by inhalation daily. 30 Cap 0    atorvastatin (LIPITOR) 20 mg tablet Take 1 Tab by mouth nightly. 30 Tab 0    escitalopram oxalate (LEXAPRO) 10 mg tablet Take 1 Tab by mouth every evening. 30 Tab 0    metoprolol tartrate (LOPRESSOR) 25 mg tablet Take 0.5 Tabs by mouth every twelve (12) hours. 30 Tab 0    roflumilast (DALIRESP) 500 mcg tab tablet Take 1 Tab by mouth daily. 30 Tab 0    OTHER Incentive spirometry- use as directed 1 Each 0    naloxone (NARCAN) 4 mg/actuation nasal spray Use 1 spray intranasally, then discard.  Repeat with new spray every 2 min as needed for opioid overdose symptoms, alternating nostrils. 1 Each 0    montelukast (SINGULAIR) 10 mg tablet Take 1 Tab by mouth nightly. 30 Tab 1       Past History     Past Medical History:  Past Medical History:   Diagnosis Date    Asthma     CHF (congestive heart failure) (HCC)     Chronic obstructive pulmonary disease (HCC)     Dependence on supplemental oxygen     Endocrine disease     thyroid issues    Gastrointestinal disorder     \"blockage in my stomach\"    Hypercholesterolemia     Hypertension        Past Surgical History:  Past Surgical History:   Procedure Laterality Date    HX GI      Exploratory laparotomy with lysis of adhesions and primary repair of incarcerated umbilical hernia       Family History:  History reviewed. No pertinent family history. Social History:  Social History     Tobacco Use    Smoking status: Current Every Day Smoker     Packs/day: 0.25    Smokeless tobacco: Current User   Substance Use Topics    Alcohol use: No     Comment: socially    Drug use: Not Currently     Types: Heroin       Allergies:  No Known Allergies    Patient's primary care provider (as noted in EPIC):  Qing Krause NP    Review of Systems   Constitutional: Negative for diaphoresis. HENT: Negative for congestion. Eyes: Negative for discharge. Respiratory: Positive for shortness of breath. Negative for stridor. Cardiovascular: Positive for chest pain. Negative for palpitations and leg swelling. Gastrointestinal: Negative for diarrhea. Genitourinary: Negative for flank pain. Musculoskeletal: Negative for back pain. Neurological: Negative for weakness. Psychiatric/Behavioral: Negative for hallucinations. All other systems reviewed and are negative. Visit Vitals  BP (!) 205/122   Pulse 75   Temp 98 °F (36.7 °C)   Resp 18   SpO2 98%       PHYSICAL EXAM:    CONSTITUTIONAL:  Alert, in no apparent distress;  well developed;  well nourished. HEAD:  Normocephalic, atraumatic. EYES:  EOMI.   Non-icteric sclera. Normal conjunctiva. ENTM:  Nose:  no rhinorrhea. Throat:  no erythema or exudate, mucous membranes moist.  NECK:  No JVD. Supple  RESPIRATORY:  Chest clear, equal breath sounds, good air movement. CARDIOVASCULAR:  Regular rate and rhythm. No murmurs, rubs, or gallops. Chest:  No rash, lesions, bruising. No reproducible tenderness to palpation. GI:  Normal bowel sounds, abdomen soft and non-tender. No rebound or guarding. BACK:  Non-tender. UPPER EXT:  Normal inspection. LOWER EXT:  No edema, no calf tenderness. Distal pulses intact. NEURO:  Moves all four extremities, and grossly normal motor exam.  SKIN:  No rashes;  Normal for age. PSYCH:  Alert and normal affect. DIFFERENTIAL DIAGNOSES/ MEDICAL DECISION MAKING:  Chest pain etiologies include acute cardiac events to include possible acute myocardial infarction, acute coronary syndrome, pneumonia, chest wall pain (myofascial/ musculoskeletal etiology), chronic obstructive pulmonary disease (copd), acute asthma exacerbation, congestive heart failure, acute bronchitis, pulmonary embolism, upper respiratory infection, referred abdominal pain, other etiologies, versus combination of the above. Diagnostic Study Results     Abnormal lab results from this emergency department encounter:  Labs Reviewed   METABOLIC PANEL, COMPREHENSIVE - Abnormal; Notable for the following components:       Result Value    Potassium 2.9 (*)     Glucose 122 (*)     GFR est non-AA 53 (*)     All other components within normal limits   CBC WITH AUTOMATED DIFF   CARDIAC PANEL,(CK, CKMB & TROPONIN)   MAGNESIUM   D DIMER   CARDIAC PANEL,(CK, CKMB & TROPONIN)       Lab values for this patient within approximately the last 12 hours:  Recent Results (from the past 12 hour(s))   CBC WITH AUTOMATED DIFF    Collection Time: 07/02/20  1:00 PM   Result Value Ref Range    WBC 7.8 4.6 - 13.2 K/uL    RBC 4.50 4. 20 - 5.30 M/uL    HGB 13.3 12.0 - 16.0 g/dL    HCT 39.8 35.0 - 45.0 %    MCV 88.4 74.0 - 97.0 FL    MCH 29.6 24.0 - 34.0 PG    MCHC 33.4 31.0 - 37.0 g/dL    RDW 12.5 11.6 - 14.5 %    PLATELET 545 076 - 050 K/uL    MPV 10.2 9.2 - 11.8 FL    NEUTROPHILS 59 40 - 73 %    LYMPHOCYTES 35 21 - 52 %    MONOCYTES 5 3 - 10 %    EOSINOPHILS 1 0 - 5 %    BASOPHILS 0 0 - 2 %    ABS. NEUTROPHILS 4.5 1.8 - 8.0 K/UL    ABS. LYMPHOCYTES 2.7 0.9 - 3.6 K/UL    ABS. MONOCYTES 0.4 0.05 - 1.2 K/UL    ABS. EOSINOPHILS 0.1 0.0 - 0.4 K/UL    ABS. BASOPHILS 0.0 0.0 - 0.1 K/UL    DF AUTOMATED     METABOLIC PANEL, COMPREHENSIVE    Collection Time: 07/02/20  1:00 PM   Result Value Ref Range    Sodium 141 136 - 145 mmol/L    Potassium 2.9 (LL) 3.5 - 5.5 mmol/L    Chloride 104 100 - 111 mmol/L    CO2 32 21 - 32 mmol/L    Anion gap 5 3.0 - 18 mmol/L    Glucose 122 (H) 74 - 99 mg/dL    BUN 13 7.0 - 18 MG/DL    Creatinine 1.08 0.6 - 1.3 MG/DL    BUN/Creatinine ratio 12 12 - 20      GFR est AA >60 >60 ml/min/1.73m2    GFR est non-AA 53 (L) >60 ml/min/1.73m2    Calcium 9.0 8.5 - 10.1 MG/DL    Bilirubin, total 0.3 0.2 - 1.0 MG/DL    ALT (SGPT) 24 13 - 56 U/L    AST (SGOT) 11 10 - 38 U/L    Alk.  phosphatase 79 45 - 117 U/L    Protein, total 7.2 6.4 - 8.2 g/dL    Albumin 3.8 3.4 - 5.0 g/dL    Globulin 3.4 2.0 - 4.0 g/dL    A-G Ratio 1.1 0.8 - 1.7     CARDIAC PANEL,(CK, CKMB & TROPONIN)    Collection Time: 07/02/20  1:00 PM   Result Value Ref Range    CK - MB 1.2 <3.6 ng/ml    CK-MB Index 1.3 0.0 - 4.0 %    CK 90 26 - 192 U/L    Troponin-I, QT <0.02 0.0 - 0.045 NG/ML   MAGNESIUM    Collection Time: 07/02/20  1:00 PM   Result Value Ref Range    Magnesium 2.0 1.6 - 2.6 mg/dL   D DIMER    Collection Time: 07/02/20  1:00 PM   Result Value Ref Range    D DIMER <0.27 <0.46 ug/ml(FEU)   EKG, 12 LEAD, INITIAL    Collection Time: 07/02/20  1:02 PM   Result Value Ref Range    Ventricular Rate 86 BPM    Atrial Rate 86 BPM    P-R Interval 128 ms    QRS Duration 136 ms    Q-T Interval 410 ms    QTC Calculation (Bezet) 490 ms    Calculated P Axis 76 degrees    Calculated R Axis -6 degrees    Calculated T Axis 112 degrees    Diagnosis       Normal sinus rhythm  Possible Left atrial enlargement  Left bundle branch block  Abnormal ECG  When compared with ECG of 30-JUN-2020 17:08,  QRS axis shifted right  Confirmed by Anastasiya Mcginnis MD, --- ([id number]) on 7/2/2020 3:48:55 PM     CARDIAC PANEL,(CK, CKMB & TROPONIN)    Collection Time: 07/02/20  4:31 PM   Result Value Ref Range    CK - MB 1.2 <3.6 ng/ml    CK-MB Index 1.5 0.0 - 4.0 %    CK 80 26 - 192 U/L    Troponin-I, QT <0.02 0.0 - 0.045 NG/ML       Radiologist and cardiologist interpretations if available at time of this note:  Xr Chest Port    Result Date: 7/2/2020  PORTABLE CHEST X-RAY CPT CODE: [id number] INDICATION: Shortness of breath. Chest pain. COMPARISON: Plain film 6/30/2020. FINDINGS: Heart size borderline to mildly enlarged similar previous. Aortic arch not enlarged. Similar enlarged central pulmonary arteries. Unclear if there may be mild vascular congestion. No pneumothorax. No overt pulmonary edema. No focal infiltrate or consolidation. No significant effusion on portable AP image. IMPRESSION: 1. Similar borderline to mild cardiomegaly. Similar enlarged central pulmonary arteries. 2. Possible mild central pulmonary vascular congestion. Medication(s) ordered for patient during this emergency visit encounter:  Medications   morphine injection 4 mg (4 mg IntraVENous Given 7/2/20 1453)   cloNIDine HCL (CATAPRES) tablet 0.1 mg (0.1 mg Oral Given 7/2/20 1634)   ALPRAZolam (XANAX) tablet 0.5 mg (0.5 mg Oral Given 7/2/20 1634)       Medical Decision Making     I am the first provider for this patient. I reviewed the vital signs, available nursing notes, past medical history, past surgical history, family history and social history. Vital Signs:  Reviewed the patient's vital signs.          Initial EKG interpretation by attending emergency physician: Normal sinus rhythm at 85 beats minute with left bundle branch block. ED COURSE:      Patient's PERC screening cannot be assessed secondary to age. Patient's HAART SCORE:    History:  2  EC  Age:  1  Risk factors:  2  Troponin:  0    Patient's Total HAART score:  5    Two sets of cardiac enzymes were normal.      IMPRESSION AND MEDICAL DECISION MAKING:  Based upon the patients presentation with noted HPI and PE, along with the work up done in the emergency department, I believe that the patient is having non-cardiac chest pain as noted. Given the time frame of the patients chest pain, two sets of cardiac enzymes were done to rule out an acute cardiac event. DIAGNOSIS:  1. Chest pain    SPECIFIC PATIENT INSTRUCTIONS FROM THE EMERGENCY PHYSICIAN WHO TREATED YOU TODAY:  1. Return if worse. 2. Follow up with your primary doctor or your cardiologist (if you have one) in the next 2-4 days for reevaluation. Patient is improved, resting quietly and comfortably. The patient will be discharged home. The patient was reassured that these symptoms do not appear to represent a serious or life threatening condition at this time. Warning signs of worsening condition were discussed and understood by the patient. Based on patient's age, coexisting illness, exam, and the results of this ED evaluation, the decision to treat as an outpatient was made. Based on the information available at time of discharge, acute pathology requiring immediate intervention was deemed relative unlikely. While it is impossible to completely exclude the possibility of underlying serious disease or worsening of condition, I feel the relative likelihood is extremely low. I discussed this uncertainty with the patient, who understood ED evaluation and treatment and felt comfortable with the outpatient treatment plan. All questions regarding care, test results, and follow up were answered. The patient is stable and appropriate to discharge. They understand that they should return to the emergency department for any new or worsening symptoms. I stressed the importance of follow up for repeat assessment and possibly further evaluation/treatment. Dictation disclaimer:  Please note that this dictation was completed with [employer], the computer voice recognition software. Quite often unanticipated grammatical, syntax, homophones, and other interpretive errors are inadvertently transcribed by the computer software. Please disregard these errors. Please excuse any errors that have escaped final proofreading. Coding Diagnoses     Clinical Impression:   1. Chest pain, unspecified type    2. SOB (shortness of breath)        Disposition     Disposition:  Discharge. HEMA Coles Board Certified Emergency Physician    Provider Attestation:  If a scribe was utilized in generation of this patient record, I personally performed the services described in the documentation, reviewed the documentation, as recorded by the scribe in my presence, and it accurately records the patient's history of presenting illness, review of systems, patient physical examination, and procedures performed by me as the attending physician. HEMA Coles Board Certified Emergency Physician  7/2/2020.  1:35 PM

## 2020-07-06 ENCOUNTER — HOSPITAL ENCOUNTER (EMERGENCY)
Age: 55
Discharge: HOME | End: 2020-07-06
Attending: EMERGENCY MEDICINE
Payer: MEDICAID

## 2020-07-06 ENCOUNTER — PATIENT OUTREACH (OUTPATIENT)
Dept: CASE MANAGEMENT | Age: 55
End: 2020-07-06

## 2020-07-06 VITALS
HEIGHT: 59 IN | HEART RATE: 78 BPM | RESPIRATION RATE: 16 BRPM | TEMPERATURE: 97.5 F | DIASTOLIC BLOOD PRESSURE: 82 MMHG | SYSTOLIC BLOOD PRESSURE: 196 MMHG | WEIGHT: 155 LBS | BODY MASS INDEX: 31.25 KG/M2 | OXYGEN SATURATION: 100 %

## 2020-07-06 DIAGNOSIS — R07.9 CHEST PAIN, UNSPECIFIED TYPE: Primary | ICD-10-CM

## 2020-07-06 LAB
ANION GAP SERPL CALC-SCNC: 3 MMOL/L (ref 3–18)
BASOPHILS # BLD: 0 K/UL (ref 0–0.1)
BASOPHILS NFR BLD: 0 % (ref 0–2)
BUN SERPL-MCNC: 13 MG/DL (ref 7–18)
BUN/CREAT SERPL: 14 (ref 12–20)
CALCIUM SERPL-MCNC: 8.8 MG/DL (ref 8.5–10.1)
CHLORIDE SERPL-SCNC: 110 MMOL/L (ref 100–111)
CO2 SERPL-SCNC: 31 MMOL/L (ref 21–32)
CREAT SERPL-MCNC: 0.93 MG/DL (ref 0.6–1.3)
DIFFERENTIAL METHOD BLD: NORMAL
EOSINOPHIL # BLD: 0.2 K/UL (ref 0–0.4)
EOSINOPHIL NFR BLD: 3 % (ref 0–5)
ERYTHROCYTE [DISTWIDTH] IN BLOOD BY AUTOMATED COUNT: 12.8 % (ref 11.6–14.5)
GLUCOSE SERPL-MCNC: 93 MG/DL (ref 74–99)
HCT VFR BLD AUTO: 42.4 % (ref 35–45)
HGB BLD-MCNC: 13.8 G/DL (ref 12–16)
LYMPHOCYTES # BLD: 2.2 K/UL (ref 0.9–3.6)
LYMPHOCYTES NFR BLD: 27 % (ref 21–52)
MCH RBC QN AUTO: 29.1 PG (ref 24–34)
MCHC RBC AUTO-ENTMCNC: 32.5 G/DL (ref 31–37)
MCV RBC AUTO: 89.5 FL (ref 74–97)
MONOCYTES # BLD: 0.3 K/UL (ref 0.05–1.2)
MONOCYTES NFR BLD: 3 % (ref 3–10)
NEUTS SEG # BLD: 5.4 K/UL (ref 1.8–8)
NEUTS SEG NFR BLD: 67 % (ref 40–73)
PLATELET # BLD AUTO: 235 K/UL (ref 135–420)
PMV BLD AUTO: 10.9 FL (ref 9.2–11.8)
POTASSIUM SERPL-SCNC: 3.4 MMOL/L (ref 3.5–5.5)
RBC # BLD AUTO: 4.74 M/UL (ref 4.2–5.3)
SODIUM SERPL-SCNC: 144 MMOL/L (ref 136–145)
TROPONIN I SERPL-MCNC: <0.02 NG/ML (ref 0–0.04)
WBC # BLD AUTO: 8.1 K/UL (ref 4.6–13.2)

## 2020-07-06 PROCEDURE — 93005 ELECTROCARDIOGRAM TRACING: CPT

## 2020-07-06 PROCEDURE — 99285 EMERGENCY DEPT VISIT HI MDM: CPT

## 2020-07-06 PROCEDURE — 85025 COMPLETE CBC W/AUTO DIFF WBC: CPT

## 2020-07-06 PROCEDURE — 80048 BASIC METABOLIC PNL TOTAL CA: CPT

## 2020-07-06 PROCEDURE — 94640 AIRWAY INHALATION TREATMENT: CPT

## 2020-07-06 PROCEDURE — 84484 ASSAY OF TROPONIN QUANT: CPT

## 2020-07-06 PROCEDURE — 74011250637 HC RX REV CODE- 250/637: Performed by: EMERGENCY MEDICINE

## 2020-07-06 RX ORDER — HYDROXYZINE PAMOATE 25 MG/1
50 CAPSULE ORAL
Status: COMPLETED | OUTPATIENT
Start: 2020-07-06 | End: 2020-07-06

## 2020-07-06 RX ORDER — CLONIDINE HYDROCHLORIDE 0.1 MG/1
0.2 TABLET ORAL
Status: COMPLETED | OUTPATIENT
Start: 2020-07-06 | End: 2020-07-06

## 2020-07-06 RX ORDER — ALBUTEROL SULFATE 90 UG/1
2 AEROSOL, METERED RESPIRATORY (INHALATION)
Qty: 1 INHALER | Refills: 0 | Status: SHIPPED | OUTPATIENT
Start: 2020-07-06 | End: 2020-07-12

## 2020-07-06 RX ORDER — ASPIRIN 325 MG
325 TABLET ORAL
Status: COMPLETED | OUTPATIENT
Start: 2020-07-06 | End: 2020-07-06

## 2020-07-06 RX ORDER — ALBUTEROL SULFATE 0.83 MG/ML
SOLUTION RESPIRATORY (INHALATION)
Status: DISCONTINUED
Start: 2020-07-06 | End: 2020-07-07 | Stop reason: HOSPADM

## 2020-07-06 RX ORDER — HYDROXYZINE PAMOATE 50 MG/1
50 CAPSULE ORAL
Qty: 10 CAP | Refills: 0 | Status: SHIPPED | OUTPATIENT
Start: 2020-07-06 | End: 2020-09-07

## 2020-07-06 RX ORDER — ALBUTEROL SULFATE 2.5 MG/.5ML
5 SOLUTION RESPIRATORY (INHALATION)
Status: DISCONTINUED | OUTPATIENT
Start: 2020-07-06 | End: 2020-07-07 | Stop reason: HOSPADM

## 2020-07-06 RX ORDER — NITROGLYCERIN 0.4 MG/1
0.4 TABLET SUBLINGUAL AS NEEDED
Status: DISCONTINUED | OUTPATIENT
Start: 2020-07-06 | End: 2020-07-07 | Stop reason: HOSPADM

## 2020-07-06 RX ORDER — PREDNISONE 50 MG/1
50 TABLET ORAL DAILY
Qty: 3 TAB | Refills: 0 | Status: SHIPPED | OUTPATIENT
Start: 2020-07-06 | End: 2020-07-09

## 2020-07-06 RX ADMIN — HYDROXYZINE PAMOATE 50 MG: 25 CAPSULE ORAL at 23:00

## 2020-07-06 RX ADMIN — CLONIDINE HYDROCHLORIDE 0.2 MG: 0.1 TABLET ORAL at 22:49

## 2020-07-06 RX ADMIN — ASPIRIN 325 MG: 325 TABLET, FILM COATED ORAL at 16:40

## 2020-07-06 RX ADMIN — NITROGLYCERIN 0.4 MG: 0.4 TABLET SUBLINGUAL at 22:08

## 2020-07-06 NOTE — ED PROVIDER NOTES
EMERGENCY DEPARTMENT HISTORY AND PHYSICAL EXAM    4:40 PM      Date: 7/6/2020  Patient Name: Ai Wilhelm    History of Presenting Illness     No chief complaint on file. History Provided By: patient    Additional History (Context): Ai Wilhelm is a 53 y.o. female presents with history of MI COPD CHF, comes in with chest pain that began around 11:00, constant pressure-like with some shortness of breath. No radiation. No medications tried for this. She comes in by EMS, EKG unchanged from prior. PCP: Hugo Reyes NP    Chief Complaint:   Duration:    Timing:    Location:   Quality:   Severity:   Modifying Factors:   Associated Symptoms:       Current Facility-Administered Medications   Medication Dose Route Frequency Provider Last Rate Last Dose    nitroglycerin (NITROSTAT) tablet 0.4 mg  0.4 mg SubLINGual PRN Windy KOLB MD   0.4 mg at 07/06/20 2208    albuterol CONCENTRATE 2.5mg/0.5 mL neb soln  5 mg Nebulization NOW Xiomy Orantes MD        albuterol (PROVENTIL VENTOLIN) 2.5 mg /3 mL (0.083 %) nebulizer solution             cloNIDine HCL (CATAPRES) tablet 0.2 mg  0.2 mg Oral NOW Lauro David MD        hydrOXYzine pamoate (VISTARIL) capsule 50 mg  50 mg Oral NOW Xiomy Orantes MD         Current Outpatient Medications   Medication Sig Dispense Refill    hydrOXYzine pamoate (VistariL) 50 mg capsule Take 1 Cap by mouth three (3) times daily as needed for Anxiety for up to 10 doses. 10 Cap 0    predniSONE (STERAPRED DS) 10 mg dose pack Use as directed, start on 7/1/2020 21 Tab 0    albuterol-ipratropium (DUO-NEB) 2.5 mg-0.5 mg/3 ml nebu 3 mL by Nebulization route every four (4) hours as needed for Wheezing. 30 Nebule 0    cloNIDine HCL (CATAPRES) 0.2 mg tablet Take 1 Tab by mouth two (2) times a day.  30 Tab 1    albuterol (PROVENTIL HFA, VENTOLIN HFA, PROAIR HFA) 90 mcg/actuation inhaler Take 2 Puffs by inhalation every four (4) hours as needed for Wheezing. 1 Inhaler 0    amLODIPine (NORVASC) 10 mg tablet Take 1 Tab by mouth daily. 15 Tab 0    ALPRAZolam (Xanax) 0.5 mg tablet Take 1 Tab by mouth every eight (8) hours as needed for Anxiety. Max Daily Amount: 1.5 mg. 20 Tab 0    albuterol (PROVENTIL HFA, VENTOLIN HFA, PROAIR HFA) 90 mcg/actuation inhaler Take 2 Puffs by inhalation every four (4) hours as needed for Wheezing. 1 Inhaler 0    aspirin delayed-release 81 mg tablet Take 1 Tab by mouth daily. 30 Tab 0    ondansetron (ZOFRAN ODT) 4 mg disintegrating tablet Take 1 Tab by mouth every eight (8) hours as needed for Nausea. Indications: prevent nausea and vomiting after surgery 20 Tab 0    famotidine (PEPCID) 20 mg tablet Take 1 Tab by mouth daily. 30 Tab 1    OXYGEN-AIR DELIVERY SYSTEMS 3 L by IntraNASal route as needed for Other (sob).  docusate sodium (COLACE) 100 mg capsule Take 1 Cap by mouth two (2) times a day. 120 Cap 0    fluticasone furoate-vilanterol (BREO ELLIPTA) 200-25 mcg/dose inhaler Take 1 Puff by inhalation daily.  arformoterol (BROVANA) 15 mcg/2 mL nebu neb solution 2 mL by Nebulization route two (2) times a day. 60 Vial 0    budesonide (PULMICORT) 0.5 mg/2 mL nbsp 2 mL by Nebulization route two (2) times a day. 60 Each 0    tiotropium (SPIRIVA) 18 mcg inhalation capsule Take 1 Cap by inhalation daily. 30 Cap 0    atorvastatin (LIPITOR) 20 mg tablet Take 1 Tab by mouth nightly. 30 Tab 0    escitalopram oxalate (LEXAPRO) 10 mg tablet Take 1 Tab by mouth every evening. 30 Tab 0    metoprolol tartrate (LOPRESSOR) 25 mg tablet Take 0.5 Tabs by mouth every twelve (12) hours. 30 Tab 0    roflumilast (DALIRESP) 500 mcg tab tablet Take 1 Tab by mouth daily. 30 Tab 0    OTHER Incentive spirometry- use as directed 1 Each 0    naloxone (NARCAN) 4 mg/actuation nasal spray Use 1 spray intranasally, then discard. Repeat with new spray every 2 min as needed for opioid overdose symptoms, alternating nostrils.  1 Each 0    montelukast (SINGULAIR) 10 mg tablet Take 1 Tab by mouth nightly. 30 Tab 1       Past History     Past Medical History:  Past Medical History:   Diagnosis Date    Asthma     CHF (congestive heart failure) (HCC)     Chronic obstructive pulmonary disease (HCC)     Dependence on supplemental oxygen     Endocrine disease     thyroid issues    Gastrointestinal disorder     \"blockage in my stomach\"    Hypercholesterolemia     Hypertension        Past Surgical History:  Past Surgical History:   Procedure Laterality Date    HX GI      Exploratory laparotomy with lysis of adhesions and primary repair of incarcerated umbilical hernia       Family History:  No family history on file. Social History:  Social History     Tobacco Use    Smoking status: Current Every Day Smoker     Packs/day: 0.25    Smokeless tobacco: Current User   Substance Use Topics    Alcohol use: No     Comment: socially    Drug use: Not Currently     Types: Heroin       Allergies:  No Known Allergies      Review of Systems     Review of Systems   Constitutional: Negative for diaphoresis and fever. HENT: Negative for congestion and sore throat. Eyes: Negative for pain and itching. Respiratory: Negative for cough and shortness of breath. Cardiovascular: Positive for chest pain. Negative for palpitations. Gastrointestinal: Negative for abdominal pain and diarrhea. Endocrine: Negative for polydipsia and polyuria. Genitourinary: Negative for dysuria and hematuria. Musculoskeletal: Negative for arthralgias and myalgias. Skin: Negative for rash and wound. Neurological: Negative for seizures and syncope. Hematological: Does not bruise/bleed easily. Psychiatric/Behavioral: Negative for agitation and hallucinations. Physical Exam       Patient Vitals for the past 12 hrs:   Temp Pulse Resp BP SpO2   07/06/20 1748 97.5 °F (36.4 °C) 61 16 188/74 100 %       Physical Exam  Vitals signs and nursing note reviewed. Constitutional:       General: She is not in acute distress. Appearance: Normal appearance. She is well-developed. She is not toxic-appearing. Comments: Appears anxious   HENT:      Head: Normocephalic and atraumatic. Eyes:      General: No scleral icterus. Conjunctiva/sclera: Conjunctivae normal.   Neck:      Musculoskeletal: Normal range of motion and neck supple. Vascular: No JVD. Cardiovascular:      Rate and Rhythm: Normal rate and regular rhythm. Heart sounds: Normal heart sounds. Comments: 4 intact extremity pulses  Pulmonary:      Effort: Pulmonary effort is normal.      Breath sounds: Normal breath sounds. Abdominal:      Palpations: Abdomen is soft. There is no mass. Tenderness: There is no abdominal tenderness. Musculoskeletal: Normal range of motion. General: No swelling. Lymphadenopathy:      Cervical: No cervical adenopathy. Skin:     General: Skin is warm and dry. Neurological:      Mental Status: She is alert. Diagnostic Study Results   Labs -  Recent Results (from the past 12 hour(s))   CBC WITH AUTOMATED DIFF    Collection Time: 07/06/20  9:53 PM   Result Value Ref Range    WBC 8.1 4.6 - 13.2 K/uL    RBC 4.74 4.20 - 5.30 M/uL    HGB 13.8 12.0 - 16.0 g/dL    HCT 42.4 35.0 - 45.0 %    MCV 89.5 74.0 - 97.0 FL    MCH 29.1 24.0 - 34.0 PG    MCHC 32.5 31.0 - 37.0 g/dL    RDW 12.8 11.6 - 14.5 %    PLATELET 951 394 - 953 K/uL    MPV 10.9 9.2 - 11.8 FL    NEUTROPHILS 67 40 - 73 %    LYMPHOCYTES 27 21 - 52 %    MONOCYTES 3 3 - 10 %    EOSINOPHILS 3 0 - 5 %    BASOPHILS 0 0 - 2 %    ABS. NEUTROPHILS 5.4 1.8 - 8.0 K/UL    ABS. LYMPHOCYTES 2.2 0.9 - 3.6 K/UL    ABS. MONOCYTES 0.3 0.05 - 1.2 K/UL    ABS. EOSINOPHILS 0.2 0.0 - 0.4 K/UL    ABS.  BASOPHILS 0.0 0.0 - 0.1 K/UL    DF AUTOMATED     METABOLIC PANEL, BASIC    Collection Time: 07/06/20  9:53 PM   Result Value Ref Range    Sodium 144 136 - 145 mmol/L    Potassium 3.4 (L) 3.5 - 5.5 mmol/L Chloride 110 100 - 111 mmol/L    CO2 31 21 - 32 mmol/L    Anion gap 3 3.0 - 18 mmol/L    Glucose 93 74 - 99 mg/dL    BUN 13 7.0 - 18 MG/DL    Creatinine 0.93 0.6 - 1.3 MG/DL    BUN/Creatinine ratio 14 12 - 20      GFR est AA >60 >60 ml/min/1.73m2    GFR est non-AA >60 >60 ml/min/1.73m2    Calcium 8.8 8.5 - 10.1 MG/DL   TROPONIN I    Collection Time: 07/06/20  9:53 PM   Result Value Ref Range    Troponin-I, QT <0.02 0.0 - 0.045 NG/ML       Radiologic Studies -   No orders to display     No results found. Medications ordered:   Medications   nitroglycerin (NITROSTAT) tablet 0.4 mg (0.4 mg SubLINGual Given 7/6/20 2208)   albuterol CONCENTRATE 2.5mg/0.5 mL neb soln (5 mg Nebulization Incomplete 7/6/20 1929)   albuterol (PROVENTIL VENTOLIN) 2.5 mg /3 mL (0.083 %) nebulizer solution (has no administration in time range)   cloNIDine HCL (CATAPRES) tablet 0.2 mg (has no administration in time range)   hydrOXYzine pamoate (VISTARIL) capsule 50 mg (has no administration in time range)   aspirin tablet 325 mg (325 mg Oral Given 7/6/20 1640)         Medical Decision Making   Initial Medical Decision Making and DDx:  Twelve-lead EKG performed by EMS, rate is 90, left bundle, unchanged compared to 2 prior in our system. Notable for 6 visits in about the last 2 weeks mostly for anxiety and chest pain. Will get basic labs and troponin, possible ACS, aspirin as ordered, doubt pneumonia PE or aortic disease    ED Course: Progress Notes, Reevaluation, and Consults:  ED Course as of Jul 06 2303   Mon Jul 06, 2020   1715 Twelve-lead EKG at 1705, sinus rhythm at 79 inferior anterior lateral T wave abnormalities. No left bundle.    [CB]   1926 Patient approaches a doctor's desk, wondering about an update. Labs have been ordered for about 2 hours and 40 minutes but of not been done.   She now says she is having trouble breathing progressing as she is talking to me and nursing notes her suggest breathing treatment so this is ordered. Still able to speak in complete sentences. Ambulatory to the bathroom of her own accord. [CB]      ED Course User Index  [CB] Jacques Tellez MD     11:03 PM Pt reevaluated at this time. Discussed results and findings, as well as, diagnosis and plan for discharge. Follow up with doctors/services listed. Return to the emergency department for alarming symptoms. Pt verbalizes understanding and agreement with plan. All questions addressed. It appears from her stay on March 23, 2020, she does have some cardiac disease. She had an end STEMI maximum troponin of 0.68, negative cardiac cath but documented regional wall motion abnormality on echo. Nevertheless she does not have an interview nipple lesion and her arteries are widely patent. I think she is suitable for discharge. Her pattern of pain is also not suggestive of angina, coming on instantaneously resolving within 5 minutes no exertional component. I think she warrants a follow-up appointment with Dr. Amy Reyes, possibly requiring a long-term nitrate or something else to help her medically manage the heart condition. I discussed at length with her that her negative cardiac catheterization is very reassuring. I do not think she is having heart attacks at home. She will follow-up with primary care. She is asking for something for anxiety so she is given a prescription for Vistaril. Also considered GI causes like reflux or esophageal spasm. 11:08 PM d/w Christina , cardiology will discuss with Dr. Marc Ross for a follow-up visit. I am the first provider for this patient. I reviewed the vital signs, available nursing notes, past medical history, past surgical history, family history and social history. Patient Vitals for the past 12 hrs:   Temp Pulse Resp BP SpO2   07/06/20 1748 97.5 °F (36.4 °C) 61 16 188/74 100 %       Vital Signs-Reviewed the patient's vital signs.     Pulse Oximetry Analysis, Cardiac Monitor, 12 lead ekg: Follow-up EKG at 2250, sinus rhythm T wave abnormalities inferior anterior lateral which are at baseline the rate is 68  Interpreted by the EP. Records Reviewed: Nursing notes reviewed (Time of Review: 4:40 PM)    Procedures:   Critical Care Time:   Aspirin: (was aspirin given for stroke?)    Diagnosis     Clinical Impression:   1. Chest pain, unspecified type        Disposition:       Follow-up Information     Follow up With Specialties Details Why Contact Info    Pamella Bolivar MD Cardiology In 2 days  [address] [facility] [address]  [facility] [address] [facility]      Patrick Sharpe NP Nurse Practitioner In 2 days  [address] [address]  [phone]             Patient's Medications   Start Taking    HYDROXYZINE PAMOATE (VISTARIL) 50 MG CAPSULE    Take 1 Cap by mouth three (3) times daily as needed for Anxiety for up to 10 doses. Continue Taking    ALBUTEROL (PROVENTIL HFA, VENTOLIN HFA, PROAIR HFA) 90 MCG/ACTUATION INHALER    Take 2 Puffs by inhalation every four (4) hours as needed for Wheezing. ALBUTEROL (PROVENTIL HFA, VENTOLIN HFA, PROAIR HFA) 90 MCG/ACTUATION INHALER    Take 2 Puffs by inhalation every four (4) hours as needed for Wheezing. ALBUTEROL-IPRATROPIUM (DUO-NEB) 2.5 MG-0.5 MG/3 ML NEBU    3 mL by Nebulization route every four (4) hours as needed for Wheezing. ALPRAZOLAM (XANAX) 0.5 MG TABLET    Take 1 Tab by mouth every eight (8) hours as needed for Anxiety. Max Daily Amount: 1.5 mg.    AMLODIPINE (NORVASC) 10 MG TABLET    Take 1 Tab by mouth daily. ARFORMOTEROL (BROVANA) 15 MCG/2 ML NEBU NEB SOLUTION    2 mL by Nebulization route two (2) times a day. ASPIRIN DELAYED-RELEASE 81 MG TABLET    Take 1 Tab by mouth daily. ATORVASTATIN (LIPITOR) 20 MG TABLET    Take 1 Tab by mouth nightly. BUDESONIDE (PULMICORT) 0.5 MG/2 ML NBSP    2 mL by Nebulization route two (2) times a day.     CLONIDINE HCL (CATAPRES) 0.2 MG TABLET    Take 1 Tab by mouth two (2) times a day. DOCUSATE SODIUM (COLACE) 100 MG CAPSULE    Take 1 Cap by mouth two (2) times a day. ESCITALOPRAM OXALATE (LEXAPRO) 10 MG TABLET    Take 1 Tab by mouth every evening. FAMOTIDINE (PEPCID) 20 MG TABLET    Take 1 Tab by mouth daily. FLUTICASONE FUROATE-VILANTEROL (BREO ELLIPTA) 200-25 MCG/DOSE INHALER    Take 1 Puff by inhalation daily. METOPROLOL TARTRATE (LOPRESSOR) 25 MG TABLET    Take 0.5 Tabs by mouth every twelve (12) hours. MONTELUKAST (SINGULAIR) 10 MG TABLET    Take 1 Tab by mouth nightly. NALOXONE (NARCAN) 4 MG/ACTUATION NASAL SPRAY    Use 1 spray intranasally, then discard. Repeat with new spray every 2 min as needed for opioid overdose symptoms, alternating nostrils. ONDANSETRON (ZOFRAN ODT) 4 MG DISINTEGRATING TABLET    Take 1 Tab by mouth every eight (8) hours as needed for Nausea. Indications: prevent nausea and vomiting after surgery    OTHER    Incentive spirometry- use as directed    OXYGEN-AIR DELIVERY SYSTEMS    3 L by IntraNASal route as needed for Other (sob). PREDNISONE (STERAPRED DS) 10 MG DOSE PACK    Use as directed, start on 7/1/2020    ROFLUMILAST (DALIRESP) 500 MCG TAB TABLET    Take 1 Tab by mouth daily. TIOTROPIUM (SPIRIVA) 18 MCG INHALATION CAPSULE    Take 1 Cap by inhalation daily.    These Medications have changed    No medications on file   Stop Taking    No medications on file     _______________________________    Notes:    Weston Reardon MD using Dragon dictation      _______________________________

## 2020-07-07 ENCOUNTER — PATIENT OUTREACH (OUTPATIENT)
Dept: CASE MANAGEMENT | Age: 55
End: 2020-07-07

## 2020-07-07 LAB
ATRIAL RATE: 68 BPM
ATRIAL RATE: 79 BPM
CALCULATED P AXIS, ECG09: 71 DEGREES
CALCULATED P AXIS, ECG09: 82 DEGREES
CALCULATED R AXIS, ECG10: 36 DEGREES
CALCULATED R AXIS, ECG10: 70 DEGREES
CALCULATED T AXIS, ECG11: -44 DEGREES
CALCULATED T AXIS, ECG11: -67 DEGREES
DIAGNOSIS, 93000: NORMAL
DIAGNOSIS, 93000: NORMAL
P-R INTERVAL, ECG05: 138 MS
P-R INTERVAL, ECG05: 138 MS
Q-T INTERVAL, ECG07: 414 MS
Q-T INTERVAL, ECG07: 426 MS
QRS DURATION, ECG06: 84 MS
QRS DURATION, ECG06: 84 MS
QTC CALCULATION (BEZET), ECG08: 452 MS
QTC CALCULATION (BEZET), ECG08: 474 MS
VENTRICULAR RATE, ECG03: 68 BPM
VENTRICULAR RATE, ECG03: 79 BPM

## 2020-07-07 NOTE — DISCHARGE INSTRUCTIONS
Patient Education        Chest Pain: Care Instructions  Your Care Instructions     There are many things that can cause chest pain. Some are not serious and will get better on their own in a few days. But some kinds of chest pain need more testing and treatment. Your doctor may have recommended a follow-up visit in the next 8 to 12 hours. If you are not getting better, you may need more tests or treatment. Even though your doctor has released you, you still need to watch for any problems. The doctor carefully checked you, but sometimes problems can develop later. If you have new symptoms or if your symptoms do not get better, get medical care right away. If you have worse or different chest pain or pressure that lasts more than 5 minutes or you passed out (lost consciousness), [id number] or seek other emergency help right away. A medical visit is only one step in your treatment. Even if you feel better, you still need to do what your doctor recommends, such as going to all suggested follow-up appointments and taking medicines exactly as directed. This will help you recover and help prevent future problems. How can you care for yourself at home? · Rest until you feel better. · Take your medicine exactly as prescribed. Call your doctor if you think you are having a problem with your medicine. · Do not drive after taking a prescription pain medicine. When should you call for help? [id number]:   · You passed out (lost consciousness). · You have severe difficulty breathing. · You have symptoms of a heart attack. These may include:  ? Chest pain or pressure, or a strange feeling in your chest.  ? Sweating. ? Shortness of breath. ? Nausea or vomiting. ? Pain, pressure, or a strange feeling in your back, neck, jaw, or upper belly or in one or both shoulders or arms. ? Lightheadedness or sudden weakness. ? A fast or irregular heartbeat.   After you call 911, the  may tell you to chew 1 adult-strength or 2 to 4 low-dose aspirin. Wait for an ambulance. Do not try to drive yourself. Call your doctor today if:   · You have any trouble breathing. · Your chest pain gets worse. · You are dizzy or lightheaded, or you feel like you may faint. · You are not getting better as expected. · You are having new or different chest pain. Where can you learn more? Go to http://magda-kadie.info/  Enter A120 in the search box to learn more about \"Chest Pain: Care Instructions. \"  Current as of: June 26, 2019               Content Version: 12.5  © 5906-0438 [employer]. Care instructions adapted under license by [employer] (which disclaims liability or warranty for this information). If you have questions about a medical condition or this instruction, always ask your healthcare professional. [address] any warranty or liability for your use of this information.

## 2020-07-08 ENCOUNTER — PATIENT OUTREACH (OUTPATIENT)
Dept: CASE MANAGEMENT | Age: 55
End: 2020-07-08

## 2020-07-08 NOTE — PROGRESS NOTES
Contacted patient for Transitions of Care Coordination  follow up. No answer. Left message introducing self, role and reason for call. Requested return call. Contact information provided. Second attempt. Episode resolved.

## 2020-07-08 NOTE — ED NOTES
Received call from [address]. Patient reportedly dropped off prescriptions from her recent visit in the ED as well as a set of prescriptions from January 2020 and also from November 2019. Gave pharmacist permission to disregard prescriptions from November and January (doxycyline and robaxin).

## 2020-07-11 ENCOUNTER — HOSPITAL ENCOUNTER (EMERGENCY)
Age: 55
Discharge: HOME | End: 2020-07-12
Attending: EMERGENCY MEDICINE
Payer: MEDICAID

## 2020-07-11 ENCOUNTER — APPOINTMENT (OUTPATIENT)
Dept: GENERAL RADIOLOGY | Age: 55
End: 2020-07-11
Attending: EMERGENCY MEDICINE
Payer: MEDICAID

## 2020-07-11 VITALS
HEART RATE: 62 BPM | OXYGEN SATURATION: 100 % | WEIGHT: 158 LBS | BODY MASS INDEX: 31.85 KG/M2 | TEMPERATURE: 97.6 F | HEIGHT: 59 IN | RESPIRATION RATE: 18 BRPM | SYSTOLIC BLOOD PRESSURE: 169 MMHG | DIASTOLIC BLOOD PRESSURE: 80 MMHG

## 2020-07-11 DIAGNOSIS — J44.1 ACUTE EXACERBATION OF CHRONIC OBSTRUCTIVE PULMONARY DISEASE (COPD) (HCC): Primary | ICD-10-CM

## 2020-07-11 LAB
ALBUMIN SERPL-MCNC: 3.4 G/DL (ref 3.4–5)
ALBUMIN/GLOB SERPL: 1.1 {RATIO} (ref 0.8–1.7)
ALP SERPL-CCNC: 82 U/L (ref 45–117)
ALT SERPL-CCNC: 18 U/L (ref 13–56)
ANION GAP SERPL CALC-SCNC: 5 MMOL/L (ref 3–18)
AST SERPL-CCNC: 17 U/L (ref 10–38)
BASOPHILS # BLD: 0 K/UL (ref 0–0.1)
BASOPHILS NFR BLD: 0 % (ref 0–2)
BILIRUB SERPL-MCNC: 0.2 MG/DL (ref 0.2–1)
BUN SERPL-MCNC: 14 MG/DL (ref 7–18)
BUN/CREAT SERPL: 17 (ref 12–20)
CALCIUM SERPL-MCNC: 8.2 MG/DL (ref 8.5–10.1)
CHLORIDE SERPL-SCNC: 110 MMOL/L (ref 100–111)
CK MB CFR SERPL CALC: 1.4 % (ref 0–4)
CK MB SERPL-MCNC: 1.1 NG/ML (ref 5–25)
CK SERPL-CCNC: 80 U/L (ref 26–192)
CO2 SERPL-SCNC: 27 MMOL/L (ref 21–32)
CREAT SERPL-MCNC: 0.82 MG/DL (ref 0.6–1.3)
DIFFERENTIAL METHOD BLD: NORMAL
EOSINOPHIL # BLD: 0.3 K/UL (ref 0–0.4)
EOSINOPHIL NFR BLD: 4 % (ref 0–5)
ERYTHROCYTE [DISTWIDTH] IN BLOOD BY AUTOMATED COUNT: 12.6 % (ref 11.6–14.5)
GLOBULIN SER CALC-MCNC: 3.2 G/DL (ref 2–4)
GLUCOSE SERPL-MCNC: 81 MG/DL (ref 74–99)
HCT VFR BLD AUTO: 38.6 % (ref 35–45)
HGB BLD-MCNC: 12.3 G/DL (ref 12–16)
INR PPP: 1 (ref 0.8–1.2)
LYMPHOCYTES # BLD: 2.2 K/UL (ref 0.9–3.6)
LYMPHOCYTES NFR BLD: 33 % (ref 21–52)
MCH RBC QN AUTO: 28.4 PG (ref 24–34)
MCHC RBC AUTO-ENTMCNC: 31.9 G/DL (ref 31–37)
MCV RBC AUTO: 89.1 FL (ref 74–97)
MONOCYTES # BLD: 0.3 K/UL (ref 0.05–1.2)
MONOCYTES NFR BLD: 5 % (ref 3–10)
NEUTS SEG # BLD: 3.8 K/UL (ref 1.8–8)
NEUTS SEG NFR BLD: 58 % (ref 40–73)
PLATELET # BLD AUTO: 199 K/UL (ref 135–420)
PMV BLD AUTO: 10.8 FL (ref 9.2–11.8)
POTASSIUM SERPL-SCNC: 4 MMOL/L (ref 3.5–5.5)
PROT SERPL-MCNC: 6.6 G/DL (ref 6.4–8.2)
PROTHROMBIN TIME: 12.8 SEC (ref 11.5–15.2)
RBC # BLD AUTO: 4.33 M/UL (ref 4.2–5.3)
SODIUM SERPL-SCNC: 142 MMOL/L (ref 136–145)
TROPONIN I SERPL-MCNC: <0.02 NG/ML (ref 0–0.04)
WBC # BLD AUTO: 6.6 K/UL (ref 4.6–13.2)

## 2020-07-11 PROCEDURE — 71045 X-RAY EXAM CHEST 1 VIEW: CPT

## 2020-07-11 PROCEDURE — 80053 COMPREHEN METABOLIC PANEL: CPT

## 2020-07-11 PROCEDURE — 99285 EMERGENCY DEPT VISIT HI MDM: CPT

## 2020-07-11 PROCEDURE — 82550 ASSAY OF CK (CPK): CPT

## 2020-07-11 PROCEDURE — 93005 ELECTROCARDIOGRAM TRACING: CPT

## 2020-07-11 PROCEDURE — 74011636637 HC RX REV CODE- 636/637: Performed by: EMERGENCY MEDICINE

## 2020-07-11 PROCEDURE — 85610 PROTHROMBIN TIME: CPT

## 2020-07-11 PROCEDURE — 85025 COMPLETE CBC W/AUTO DIFF WBC: CPT

## 2020-07-11 RX ORDER — ACETAMINOPHEN 500 MG
500 TABLET ORAL
Status: DISCONTINUED | OUTPATIENT
Start: 2020-07-11 | End: 2020-07-12 | Stop reason: HOSPADM

## 2020-07-11 RX ORDER — PREDNISONE 20 MG/1
60 TABLET ORAL
Status: COMPLETED | OUTPATIENT
Start: 2020-07-11 | End: 2020-07-11

## 2020-07-11 RX ADMIN — PREDNISONE 60 MG: 20 TABLET ORAL at 22:25

## 2020-07-12 LAB
ATRIAL RATE: 59 BPM
CALCULATED P AXIS, ECG09: 74 DEGREES
CALCULATED R AXIS, ECG10: 66 DEGREES
CALCULATED T AXIS, ECG11: -54 DEGREES
DIAGNOSIS, 93000: NORMAL
P-R INTERVAL, ECG05: 140 MS
Q-T INTERVAL, ECG07: 422 MS
QRS DURATION, ECG06: 88 MS
QTC CALCULATION (BEZET), ECG08: 417 MS
TROPONIN I BLD-MCNC: <0.04 NG/ML (ref 0–0.08)
VENTRICULAR RATE, ECG03: 59 BPM

## 2020-07-12 PROCEDURE — 84484 ASSAY OF TROPONIN QUANT: CPT

## 2020-07-12 RX ORDER — IPRATROPIUM BROMIDE AND ALBUTEROL SULFATE 2.5; .5 MG/3ML; MG/3ML
3 SOLUTION RESPIRATORY (INHALATION)
Qty: 30 NEBULE | Refills: 0 | Status: SHIPPED | OUTPATIENT
Start: 2020-07-12 | End: 2020-07-28

## 2020-07-12 RX ORDER — PREDNISONE 50 MG/1
50 TABLET ORAL DAILY
Qty: 3 TAB | Refills: 0 | Status: SHIPPED | OUTPATIENT
Start: 2020-07-12 | End: 2020-07-15

## 2020-07-12 RX ORDER — ALBUTEROL SULFATE 90 UG/1
2 AEROSOL, METERED RESPIRATORY (INHALATION)
Qty: 1 INHALER | Refills: 0 | OUTPATIENT
Start: 2020-07-12 | End: 2020-07-20

## 2020-07-12 NOTE — ED TRIAGE NOTES
Pt c/o chest pain, pt ambulatory in the hallways, able to walk without shortness of breath, pt up and down to charge nurse desk, pt repeatedly asking for pain medication. Dr. Jaciel Garcia made aware.

## 2020-07-12 NOTE — DISCHARGE INSTRUCTIONS
Patient Education        Chronic Obstructive Pulmonary Disease (COPD): Care Instructions  Your Care Instructions     Chronic obstructive pulmonary disease (COPD) is a general term for a group of lung diseases, including emphysema and chronic bronchitis. People with COPD have decreased airflow in and out of the lungs, which makes it hard to breathe. The airways also can get clogged with thick mucus. Cigarette smoking is a major cause of COPD. Although there is no cure for COPD, you can slow its progress. Following your treatment plan and taking care of yourself can help you feel better and live longer. Follow-up care is a key part of your treatment and safety. Be sure to make and go to all appointments, and call your doctor if you are having problems. It's also a good idea to know your test results and keep a list of the medicines you take. How can you care for yourself at home? Staying healthy  · Do not smoke. This is the most important step you can take to prevent more damage to your lungs. If you need help quitting, talk to your doctor about stop-smoking programs and medicines. These can increase your chances of quitting for good. · Avoid colds and flu. Get a pneumococcal vaccine shot. If you have had one before, ask your doctor whether you need a second dose. Get the flu vaccine every fall. If you must be around people with colds or the flu, wash your hands often. · Avoid secondhand smoke, air pollution, and high altitudes. Also avoid cold, dry air and hot, humid air. Stay at home with your windows closed when air pollution is bad. Medicines and oxygen therapy  · Take your medicines exactly as prescribed. Call your doctor if you think you are having a problem with your medicine. You may be taking medicines such as:  ? Bronchodilators. These help open your airways and make breathing easier. They are either short-acting (work for 6 to 9 hours) or long-acting (work for 24 hours).  You inhale most bronchodilators, so they start to act quickly. Always carry your quick-relief inhaler with you in case you need it while you are away from home. ? Corticosteroids (prednisone, budesonide). These reduce airway inflammation. They come in pill or inhaled form. You must take these medicines every day for them to work well. · Ask your doctor or pharmacist if a spacer is right for you. A spacer may help you get more inhaled medicine to your lungs. If you use one, ask how to use it properly. · Do not take any vitamins, over-the-counter medicine, or herbal products without talking to your doctor first.  · If your doctor prescribed antibiotics, take them as directed. Do not stop taking them just because you feel better. You need to take the full course of antibiotics. · If you use oxygen therapy, use the flow rate your doctor has recommended. Don't change it without talking to your doctor first. Oxygen therapy boosts the amount of oxygen in your blood and helps you breathe easier. Activity  · Get regular exercise. Walking is an easy way to get exercise. Start out slowly, and walk a little more each day. · Pay attention to your breathing. You are exercising too hard if you can't talk while you exercise. · Take short rest breaks when doing household chores and other activities. · Learn breathing methods--such as breathing through pursed lips--to help you become less short of breath. · If your doctor has not set you up with a pulmonary rehabilitation program, ask if rehab is right for you. Rehab includes exercise programs, education about your disease and how to manage it, help with diet and other changes, and emotional support. Diet  · Eat regular, healthy meals. Use bronchodilators about 1 hour before you eat to make it easier to eat. Eat several small meals instead of three large ones. Drink beverages at the end of the meal. Avoid foods that are hard to chew.   · Eat foods that contain protein so you don't lose muscle mass. · Talk with your doctor if you gain too much weight or if you lose weight without trying. Mental health  · Talk to your family, friends, or a therapist about your feelings. Some people feel frightened, angry, hopeless, helpless, and even guilty. Talking openly about bad feelings can help you cope. If these feelings last, talk to your doctor. When should you call for help? [id number] anytime you think you may need emergency care. For example, call if:  · You have severe trouble breathing. Call your doctor now or seek immediate medical care if:  · You have new or worse trouble breathing. · You cough up blood. · You have a fever. Watch closely for changes in your health, and be sure to contact your doctor if:  · You cough more deeply or more often, especially if you notice more mucus or a change in the color of your mucus. · You have new or worse swelling in your legs or belly. · You are not getting better as expected. Where can you learn more? Go to http://magda-kadie.info/  Enter K634 in the search box to learn more about \"Chronic Obstructive Pulmonary Disease (COPD): Care Instructions. \"  Current as of: February 24, 2020               Content Version: 12.5  © 2006-2020 [employer]. Care instructions adapted under license by [employer] (which disclaims liability or warranty for this information). If you have questions about a medical condition or this instruction, always ask your healthcare professional. [address] any warranty or liability for your use of this information.

## 2020-07-12 NOTE — ED PROVIDER NOTES
EMERGENCY DEPARTMENT HISTORY AND PHYSICAL EXAM  This was created with voice recognition software and transcription errors may be present. 9:26 PM  Date: 7/11/2020  Patient Name: Melissa Chilel    History of Presenting Illness     Chief Complaint:    History Provided By:     HPI: Melissa Chilel is a 53 y.o. female has medical history of asthma CHF COPD oxygen dependent thyroid problems high cholesterol hypertension who presents with chest tightness. Patient states this is similar to prior COPD. She states that the chest tightness and that she is having wheezing. No fevers or chills. No nausea no vomiting. No radiation. Nonexertional.    PCP: Mara Gallego NP      Past History     Past Medical History:  Past Medical History:   Diagnosis Date    Asthma     CHF (congestive heart failure) (HCC)     Chronic obstructive pulmonary disease (HCC)     Dependence on supplemental oxygen     Endocrine disease     thyroid issues    Gastrointestinal disorder     \"blockage in my stomach\"    Hypercholesterolemia     Hypertension        Past Surgical History:  Past Surgical History:   Procedure Laterality Date    HX GI      Exploratory laparotomy with lysis of adhesions and primary repair of incarcerated umbilical hernia       Family History:  No family history on file. Social History:  Social History     Tobacco Use    Smoking status: Current Every Day Smoker     Packs/day: 0.25    Smokeless tobacco: Current User   Substance Use Topics    Alcohol use: No     Comment: socially    Drug use: Not Currently     Types: Heroin       Allergies:  No Known Allergies    Review of Systems     Review of Systems   All other systems reviewed and are negative. 10 point review of systems otherwise negative unless noted in HPI. Physical Exam       Physical Exam  Constitutional:       Appearance: She is well-developed. HENT:      Head: Normocephalic and atraumatic.    Eyes:      Pupils: Pupils are equal, round, and reactive to light. Neck:      Musculoskeletal: Normal range of motion and neck supple. Cardiovascular:      Rate and Rhythm: Normal rate and regular rhythm. Heart sounds: Normal heart sounds. No murmur. No friction rub. Pulmonary:      Effort: Pulmonary effort is normal. No respiratory distress. Breath sounds: Normal breath sounds. No wheezing. Abdominal:      General: There is no distension. Palpations: Abdomen is soft. Tenderness: There is no abdominal tenderness. There is no guarding or rebound. Musculoskeletal: Normal range of motion. Skin:     General: Skin is warm and dry. Neurological:      Mental Status: She is alert and oriented to person, place, and time. Psychiatric:         Behavior: Behavior normal.         Thought Content: Thought content normal.         Diagnostic Study Results     Vital Signs  EKG: EKG shows sinus bradycardia at 59 with a normal axis and normal intervals there is no ST elevation or depression and no hypertrophy unchanged compared to prior  Labs: Trop negative x2 chemistry is unremarkable  Imaging: cxr napd. Medical Decision Making     ED Course: Progress Notes, Reevaluation, and Consults:      Provider Notes (Medical Decision Making): Presents with chest tightness similar to prior asthma. Trope negative x2 given steroids symptomatically improved. Requesting a prescription for steroids. Will discharge. No infectious symptoms         Diagnosis     Clinical Impression: No diagnosis found. Disposition:    Patient's Medications   Start Taking    No medications on file   Continue Taking    ALBUTEROL (PROVENTIL HFA, VENTOLIN HFA, PROAIR HFA) 90 MCG/ACTUATION INHALER    Take 2 Puffs by inhalation every four (4) hours as needed for Wheezing. ALBUTEROL-IPRATROPIUM (DUO-NEB) 2.5 MG-0.5 MG/3 ML NEBU    3 mL by Nebulization route every four (4) hours as needed for Wheezing.     ALPRAZOLAM (XANAX) 0.5 MG TABLET    Take 1 Tab by mouth every eight (8) hours as needed for Anxiety. Max Daily Amount: 1.5 mg.    AMLODIPINE (NORVASC) 10 MG TABLET    Take 1 Tab by mouth daily. ARFORMOTEROL (BROVANA) 15 MCG/2 ML NEBU NEB SOLUTION    2 mL by Nebulization route two (2) times a day. ASPIRIN DELAYED-RELEASE 81 MG TABLET    Take 1 Tab by mouth daily. ATORVASTATIN (LIPITOR) 20 MG TABLET    Take 1 Tab by mouth nightly. BUDESONIDE (PULMICORT) 0.5 MG/2 ML NBSP    2 mL by Nebulization route two (2) times a day. CLONIDINE HCL (CATAPRES) 0.2 MG TABLET    Take 1 Tab by mouth two (2) times a day. DOCUSATE SODIUM (COLACE) 100 MG CAPSULE    Take 1 Cap by mouth two (2) times a day. ESCITALOPRAM OXALATE (LEXAPRO) 10 MG TABLET    Take 1 Tab by mouth every evening. FAMOTIDINE (PEPCID) 20 MG TABLET    Take 1 Tab by mouth daily. FLUTICASONE FUROATE-VILANTEROL (BREO ELLIPTA) 200-25 MCG/DOSE INHALER    Take 1 Puff by inhalation daily. HYDROXYZINE PAMOATE (VISTARIL) 50 MG CAPSULE    Take 1 Cap by mouth three (3) times daily as needed for Anxiety for up to 10 doses. METOPROLOL TARTRATE (LOPRESSOR) 25 MG TABLET    Take 0.5 Tabs by mouth every twelve (12) hours. MONTELUKAST (SINGULAIR) 10 MG TABLET    Take 1 Tab by mouth nightly. NALOXONE (NARCAN) 4 MG/ACTUATION NASAL SPRAY    Use 1 spray intranasally, then discard. Repeat with new spray every 2 min as needed for opioid overdose symptoms, alternating nostrils. ONDANSETRON (ZOFRAN ODT) 4 MG DISINTEGRATING TABLET    Take 1 Tab by mouth every eight (8) hours as needed for Nausea. Indications: prevent nausea and vomiting after surgery    OTHER    Incentive spirometry- use as directed    OXYGEN-AIR DELIVERY SYSTEMS    3 L by IntraNASal route as needed for Other (sob). ROFLUMILAST (DALIRESP) 500 MCG TAB TABLET    Take 1 Tab by mouth daily. TIOTROPIUM (SPIRIVA) 18 MCG INHALATION CAPSULE    Take 1 Cap by inhalation daily.    These Medications have changed    No medications on file   Stop Taking    No medications on file

## 2020-07-13 ENCOUNTER — PATIENT OUTREACH (OUTPATIENT)
Dept: CASE MANAGEMENT | Age: 55
End: 2020-07-13

## 2020-07-13 NOTE — PROGRESS NOTES
Date/Time:  7/13/2020 1:24 PM  Attempted to reach patient by telephone. Left HIPPA compliant message requesting a return call. Will attempt to reach patient again.

## 2020-07-14 ENCOUNTER — PATIENT OUTREACH (OUTPATIENT)
Dept: CASE MANAGEMENT | Age: 55
End: 2020-07-14

## 2020-07-20 ENCOUNTER — HOSPITAL ENCOUNTER (EMERGENCY)
Age: 55
Discharge: HOME | End: 2020-07-20
Attending: EMERGENCY MEDICINE
Payer: MEDICAID

## 2020-07-20 ENCOUNTER — APPOINTMENT (OUTPATIENT)
Dept: GENERAL RADIOLOGY | Age: 55
End: 2020-07-20
Attending: STUDENT IN AN ORGANIZED HEALTH CARE EDUCATION/TRAINING PROGRAM
Payer: MEDICAID

## 2020-07-20 VITALS
WEIGHT: 158 LBS | HEART RATE: 59 BPM | BODY MASS INDEX: 31.85 KG/M2 | SYSTOLIC BLOOD PRESSURE: 179 MMHG | TEMPERATURE: 97.5 F | HEIGHT: 59 IN | DIASTOLIC BLOOD PRESSURE: 74 MMHG | OXYGEN SATURATION: 100 % | RESPIRATION RATE: 16 BRPM

## 2020-07-20 DIAGNOSIS — Z91.14 POOR COMPLIANCE WITH MEDICATION: ICD-10-CM

## 2020-07-20 DIAGNOSIS — R06.02 SOB (SHORTNESS OF BREATH): Primary | ICD-10-CM

## 2020-07-20 DIAGNOSIS — I50.22 CHRONIC SYSTOLIC CONGESTIVE HEART FAILURE (HCC): ICD-10-CM

## 2020-07-20 DIAGNOSIS — J44.1 COPD EXACERBATION (HCC): ICD-10-CM

## 2020-07-20 LAB
ALBUMIN SERPL-MCNC: 3.7 G/DL (ref 3.4–5)
ALBUMIN/GLOB SERPL: 1.1 {RATIO} (ref 0.8–1.7)
ALP SERPL-CCNC: 84 U/L (ref 45–117)
ALT SERPL-CCNC: 19 U/L (ref 13–56)
ANION GAP SERPL CALC-SCNC: 5 MMOL/L (ref 3–18)
APPEARANCE UR: CLEAR
AST SERPL-CCNC: 16 U/L (ref 10–38)
ATRIAL RATE: 56 BPM
BACTERIA URNS QL MICRO: ABNORMAL /HPF
BASOPHILS # BLD: 0 K/UL (ref 0–0.1)
BASOPHILS NFR BLD: 1 % (ref 0–2)
BILIRUB SERPL-MCNC: 0.6 MG/DL (ref 0.2–1)
BILIRUB UR QL: NEGATIVE
BUN SERPL-MCNC: 17 MG/DL (ref 7–18)
BUN/CREAT SERPL: 18 (ref 12–20)
CALCIUM SERPL-MCNC: 8.4 MG/DL (ref 8.5–10.1)
CALCULATED P AXIS, ECG09: 79 DEGREES
CALCULATED R AXIS, ECG10: 63 DEGREES
CALCULATED T AXIS, ECG11: -31 DEGREES
CHLORIDE SERPL-SCNC: 110 MMOL/L (ref 100–111)
CK MB CFR SERPL CALC: 1.2 % (ref 0–4)
CK MB CFR SERPL CALC: NORMAL % (ref 0–4)
CK MB SERPL-MCNC: 1 NG/ML (ref 5–25)
CK MB SERPL-MCNC: <1 NG/ML (ref 5–25)
CK SERPL-CCNC: 81 U/L (ref 26–192)
CK SERPL-CCNC: 86 U/L (ref 26–192)
CO2 SERPL-SCNC: 28 MMOL/L (ref 21–32)
COLOR UR: YELLOW
CREAT SERPL-MCNC: 0.93 MG/DL (ref 0.6–1.3)
DIAGNOSIS, 93000: NORMAL
DIFFERENTIAL METHOD BLD: NORMAL
EOSINOPHIL # BLD: 0.3 K/UL (ref 0–0.4)
EOSINOPHIL NFR BLD: 3 % (ref 0–5)
EPITH CASTS URNS QL MICRO: ABNORMAL /LPF (ref 0–5)
ERYTHROCYTE [DISTWIDTH] IN BLOOD BY AUTOMATED COUNT: 13.1 % (ref 11.6–14.5)
GLOBULIN SER CALC-MCNC: 3.3 G/DL (ref 2–4)
GLUCOSE SERPL-MCNC: 94 MG/DL (ref 74–99)
GLUCOSE UR STRIP.AUTO-MCNC: NEGATIVE MG/DL
HCT VFR BLD AUTO: 37.5 % (ref 35–45)
HGB BLD-MCNC: 12.3 G/DL (ref 12–16)
HGB UR QL STRIP: NEGATIVE
KETONES UR QL STRIP.AUTO: NEGATIVE MG/DL
LEUKOCYTE ESTERASE UR QL STRIP.AUTO: ABNORMAL
LYMPHOCYTES # BLD: 2 K/UL (ref 0.9–3.6)
LYMPHOCYTES NFR BLD: 24 % (ref 21–52)
MCH RBC QN AUTO: 29.1 PG (ref 24–34)
MCHC RBC AUTO-ENTMCNC: 32.8 G/DL (ref 31–37)
MCV RBC AUTO: 88.7 FL (ref 74–97)
MONOCYTES # BLD: 0.4 K/UL (ref 0.05–1.2)
MONOCYTES NFR BLD: 5 % (ref 3–10)
NEUTS SEG # BLD: 5.4 K/UL (ref 1.8–8)
NEUTS SEG NFR BLD: 67 % (ref 40–73)
NITRITE UR QL STRIP.AUTO: NEGATIVE
P-R INTERVAL, ECG05: 140 MS
PH UR STRIP: 5 [PH] (ref 5–8)
PLATELET # BLD AUTO: 220 K/UL (ref 135–420)
PMV BLD AUTO: 10.4 FL (ref 9.2–11.8)
POTASSIUM SERPL-SCNC: 3.7 MMOL/L (ref 3.5–5.5)
PROT SERPL-MCNC: 7 G/DL (ref 6.4–8.2)
PROT UR STRIP-MCNC: NEGATIVE MG/DL
Q-T INTERVAL, ECG07: 450 MS
QRS DURATION, ECG06: 84 MS
QTC CALCULATION (BEZET), ECG08: 434 MS
RBC # BLD AUTO: 4.23 M/UL (ref 4.2–5.3)
RBC #/AREA URNS HPF: ABNORMAL /HPF (ref 0–5)
SODIUM SERPL-SCNC: 143 MMOL/L (ref 136–145)
SP GR UR REFRACTOMETRY: 1.03 (ref 1–1.03)
TROPONIN I SERPL-MCNC: <0.02 NG/ML (ref 0–0.04)
TROPONIN I SERPL-MCNC: <0.02 NG/ML (ref 0–0.04)
UROBILINOGEN UR QL STRIP.AUTO: 1 EU/DL (ref 0.2–1)
VENTRICULAR RATE, ECG03: 56 BPM
WBC # BLD AUTO: 8.1 K/UL (ref 4.6–13.2)
WBC URNS QL MICRO: ABNORMAL /HPF (ref 0–4)

## 2020-07-20 PROCEDURE — 81001 URINALYSIS AUTO W/SCOPE: CPT

## 2020-07-20 PROCEDURE — 96374 THER/PROPH/DIAG INJ IV PUSH: CPT

## 2020-07-20 PROCEDURE — 99284 EMERGENCY DEPT VISIT MOD MDM: CPT

## 2020-07-20 PROCEDURE — 85025 COMPLETE CBC W/AUTO DIFF WBC: CPT

## 2020-07-20 PROCEDURE — 74011000250 HC RX REV CODE- 250: Performed by: STUDENT IN AN ORGANIZED HEALTH CARE EDUCATION/TRAINING PROGRAM

## 2020-07-20 PROCEDURE — 94640 AIRWAY INHALATION TREATMENT: CPT

## 2020-07-20 PROCEDURE — 74011250636 HC RX REV CODE- 250/636: Performed by: STUDENT IN AN ORGANIZED HEALTH CARE EDUCATION/TRAINING PROGRAM

## 2020-07-20 PROCEDURE — 96375 TX/PRO/DX INJ NEW DRUG ADDON: CPT

## 2020-07-20 PROCEDURE — 93005 ELECTROCARDIOGRAM TRACING: CPT

## 2020-07-20 PROCEDURE — 71046 X-RAY EXAM CHEST 2 VIEWS: CPT

## 2020-07-20 PROCEDURE — 82550 ASSAY OF CK (CPK): CPT

## 2020-07-20 PROCEDURE — 80053 COMPREHEN METABOLIC PANEL: CPT

## 2020-07-20 RX ORDER — ALBUTEROL SULFATE 0.83 MG/ML
2.5 SOLUTION RESPIRATORY (INHALATION)
Status: COMPLETED | OUTPATIENT
Start: 2020-07-20 | End: 2020-07-20

## 2020-07-20 RX ORDER — ACETAMINOPHEN 500 MG
1000 TABLET ORAL ONCE
Status: DISCONTINUED | OUTPATIENT
Start: 2020-07-20 | End: 2020-07-20 | Stop reason: HOSPADM

## 2020-07-20 RX ORDER — FUROSEMIDE 20 MG/1
20 TABLET ORAL DAILY
Qty: 5 TAB | Refills: 0 | Status: SHIPPED | OUTPATIENT
Start: 2020-07-20 | End: 2020-10-10

## 2020-07-20 RX ORDER — FUROSEMIDE 10 MG/ML
40 INJECTION INTRAMUSCULAR; INTRAVENOUS
Status: COMPLETED | OUTPATIENT
Start: 2020-07-20 | End: 2020-07-20

## 2020-07-20 RX ORDER — ISOSORBIDE MONONITRATE 30 MG/1
30 TABLET, EXTENDED RELEASE ORAL DAILY
Qty: 7 TAB | Refills: 0 | Status: SHIPPED | OUTPATIENT
Start: 2020-07-20 | End: 2020-07-27

## 2020-07-20 RX ADMIN — METHYLPREDNISOLONE SODIUM SUCCINATE 125 MG: 125 INJECTION, POWDER, FOR SOLUTION INTRAMUSCULAR; INTRAVENOUS at 16:54

## 2020-07-20 RX ADMIN — ALBUTEROL SULFATE 2.5 MG: 2.5 SOLUTION RESPIRATORY (INHALATION) at 17:34

## 2020-07-20 RX ADMIN — FUROSEMIDE 40 MG: 10 INJECTION, SOLUTION INTRAMUSCULAR; INTRAVENOUS at 16:56

## 2020-07-20 NOTE — ED PROVIDER NOTES
EMERGENCY DEPARTMENT HISTORY AND PHYSICAL EXAM    3:36 PM      Date: 7/20/2020  Patient Name: Wyatt Molina    History of Presenting Illness     Chief Complaint   Patient presents with    Chest Pain    Fall    Flank Pain         History Provided By: Patient  Location/Duration/Severity/Modifying factors   HPI 63yo F pmhx of COPD, systolic HF reduced EF (44-87% 3/2020), HTN, HLD presents with chest pain, shortness of breath for the last week. Initially patient told EMS who reported to triage that patient had accidentally fallen onto a table and has subsequent chest pain. The patient re-iterated this history to me on initial eval - pointing to bruises on her arm and leg. After initial labs, patient walking around ED, and discussion about not giving percocet - patient states she is now SOB with leg swelling, she states that this is actually why she came in today. Despite triage report and documentation of flank pain and urinary complaints patient denies ever saying such and denies current sxs. Upon review of patient's chart patient has extensive hx of same - hx of NSTEMI, usually gets Lasix, duonebs in the ED with resolution of sxs. Patient has not followed up with Cardiology despite review of record showing wall motion and reduced EF. Patient reports taking lasix, but states that she only gets 3 days for this. PCP: Tracy Hernandez NP    Current Facility-Administered Medications   Medication Dose Route Frequency Provider Last Rate Last Dose    acetaminophen (TYLENOL) tablet 1,000 mg  1,000 mg Oral ONCE Woelfel, Raelene Meckel, MD         Current Outpatient Medications   Medication Sig Dispense Refill    isosorbide mononitrate ER (IMDUR) 30 mg tablet Take 1 Tab by mouth daily for 7 days. 7 Tab 0    furosemide (Lasix) 20 mg tablet Take 1 Tab by mouth daily. 5 Tab 0    albuterol sulfate 90 mcg/actuation aebs Take 2 Puffs by inhalation every four to six (4-6) hours as needed for Wheezing.  1 Inhaler 0    albuterol-ipratropium (DUO-NEB) 2.5 mg-0.5 mg/3 ml nebu 3 mL by Nebulization route every four (4) hours as needed for Wheezing. 30 Nebule 0    hydrOXYzine pamoate (VistariL) 50 mg capsule Take 1 Cap by mouth three (3) times daily as needed for Anxiety for up to 10 doses. 10 Cap 0    cloNIDine HCL (CATAPRES) 0.2 mg tablet Take 1 Tab by mouth two (2) times a day. 30 Tab 1    amLODIPine (NORVASC) 10 mg tablet Take 1 Tab by mouth daily. 15 Tab 0    ALPRAZolam (Xanax) 0.5 mg tablet Take 1 Tab by mouth every eight (8) hours as needed for Anxiety. Max Daily Amount: 1.5 mg. 20 Tab 0    aspirin delayed-release 81 mg tablet Take 1 Tab by mouth daily. 30 Tab 0    ondansetron (ZOFRAN ODT) 4 mg disintegrating tablet Take 1 Tab by mouth every eight (8) hours as needed for Nausea. Indications: prevent nausea and vomiting after surgery 20 Tab 0    famotidine (PEPCID) 20 mg tablet Take 1 Tab by mouth daily. 30 Tab 1    OXYGEN-AIR DELIVERY SYSTEMS 3 L by IntraNASal route as needed for Other (sob).  docusate sodium (COLACE) 100 mg capsule Take 1 Cap by mouth two (2) times a day. 120 Cap 0    fluticasone furoate-vilanterol (BREO ELLIPTA) 200-25 mcg/dose inhaler Take 1 Puff by inhalation daily.  arformoterol (BROVANA) 15 mcg/2 mL nebu neb solution 2 mL by Nebulization route two (2) times a day. 60 Vial 0    budesonide (PULMICORT) 0.5 mg/2 mL nbsp 2 mL by Nebulization route two (2) times a day. 60 Each 0    tiotropium (SPIRIVA) 18 mcg inhalation capsule Take 1 Cap by inhalation daily. 30 Cap 0    atorvastatin (LIPITOR) 20 mg tablet Take 1 Tab by mouth nightly. 30 Tab 0    escitalopram oxalate (LEXAPRO) 10 mg tablet Take 1 Tab by mouth every evening. 30 Tab 0    metoprolol tartrate (LOPRESSOR) 25 mg tablet Take 0.5 Tabs by mouth every twelve (12) hours. 30 Tab 0    roflumilast (DALIRESP) 500 mcg tab tablet Take 1 Tab by mouth daily.  30 Tab 0    OTHER Incentive spirometry- use as directed 1 Each 0    naloxone [facility]) 4 mg/actuation nasal spray Use 1 spray intranasally, then discard. Repeat with new spray every 2 min as needed for opioid overdose symptoms, alternating nostrils. 1 Each 0    montelukast (SINGULAIR) 10 mg tablet Take 1 Tab by mouth nightly. 30 Tab 1       Past History     Past Medical History:  Past Medical History:   Diagnosis Date    Asthma     CHF (congestive heart failure) (HCC)     Chronic obstructive pulmonary disease (HCC)     Dependence on supplemental oxygen     Endocrine disease     thyroid issues    Gastrointestinal disorder     \"blockage in my stomach\"    Hypercholesterolemia     Hypertension        Past Surgical History:  Past Surgical History:   Procedure Laterality Date    HX GI      Exploratory laparotomy with lysis of adhesions and primary repair of incarcerated umbilical hernia       Family History:  No family history on file. Social History:  Social History     Tobacco Use    Smoking status: Current Every Day Smoker     Packs/day: 0.25    Smokeless tobacco: Current User   Substance Use Topics    Alcohol use: No     Comment: socially    Drug use: Not Currently     Types: Heroin       Allergies:  No Known Allergies      Review of Systems       Review of Systems   Constitutional: Negative for chills, diaphoresis, fatigue and fever. HENT: Negative for congestion. Eyes: Negative for visual disturbance. Respiratory: Positive for chest tightness and shortness of breath. Cardiovascular: Positive for leg swelling. Negative for chest pain and palpitations. Gastrointestinal: Negative for abdominal pain, constipation, diarrhea, nausea and vomiting. Genitourinary: Negative for difficulty urinating. Musculoskeletal: Negative for gait problem. Skin: Negative for rash. Neurological: Negative for dizziness, syncope, light-headedness and headaches. Psychiatric/Behavioral: Negative for suicidal ideas.          Physical Exam     Visit Vitals  /74 (BP 1 Location: Right arm, BP Patient Position: Sitting)   Pulse (!) 59   Temp 97.5 °F (36.4 °C)   Resp 16   Ht 4' 11\" (1.499 m)   Wt 71.7 kg (158 lb)   LMP 04/14/2009   SpO2 100%   BMI 31.91 kg/m²         Physical Exam  Vitals signs and nursing note reviewed. Exam conducted with a chaperone present. Constitutional:       General: She is not in acute distress. Appearance: She is obese. She is not ill-appearing, toxic-appearing or diaphoretic. HENT:      Head: Normocephalic. Mouth/Throat:      Mouth: Mucous membranes are moist.   Eyes:      Extraocular Movements: Extraocular movements intact. Conjunctiva/sclera: Conjunctivae normal.   Neck:      Vascular: JVD present. Cardiovascular:      Rate and Rhythm: Normal rate and regular rhythm. Heart sounds: Normal heart sounds. Pulmonary:      Effort: Pulmonary effort is normal. No tachypnea or respiratory distress. Breath sounds: Decreased breath sounds present. Chest:      Chest wall: No mass or tenderness. Abdominal:      Palpations: Abdomen is soft. Tenderness: There is no abdominal tenderness. Musculoskeletal: Normal range of motion. Right lower leg: Edema (2+) present. Left lower leg: Edema (2+) present. Skin:     General: Skin is warm. Capillary Refill: Capillary refill takes less than 2 seconds. Coloration: Skin is not pale. Neurological:      General: No focal deficit present. Mental Status: She is alert and oriented to person, place, and time. Mental status is at baseline.    Psychiatric:         Mood and Affect: Mood normal.           Diagnostic Study Results     Labs -  Recent Results (from the past 12 hour(s))   CBC WITH AUTOMATED DIFF    Collection Time: 07/20/20  1:40 PM   Result Value Ref Range    WBC 8.1 4.6 - 13.2 K/uL    RBC 4.23 4.20 - 5.30 M/uL    HGB 12.3 12.0 - 16.0 g/dL    HCT 37.5 35.0 - 45.0 %    MCV 88.7 74.0 - 97.0 FL    MCH 29.1 24.0 - 34.0 PG    MCHC 32.8 31.0 - 37.0 g/dL    RDW 13.1 11.6 - 14.5 %    PLATELET 714 187 - 160 K/uL    MPV 10.4 9.2 - 11.8 FL    NEUTROPHILS 67 40 - 73 %    LYMPHOCYTES 24 21 - 52 %    MONOCYTES 5 3 - 10 %    EOSINOPHILS 3 0 - 5 %    BASOPHILS 1 0 - 2 %    ABS. NEUTROPHILS 5.4 1.8 - 8.0 K/UL    ABS. LYMPHOCYTES 2.0 0.9 - 3.6 K/UL    ABS. MONOCYTES 0.4 0.05 - 1.2 K/UL    ABS. EOSINOPHILS 0.3 0.0 - 0.4 K/UL    ABS. BASOPHILS 0.0 0.0 - 0.1 K/UL    DF AUTOMATED     METABOLIC PANEL, COMPREHENSIVE    Collection Time: 07/20/20  1:40 PM   Result Value Ref Range    Sodium 143 136 - 145 mmol/L    Potassium 3.7 3.5 - 5.5 mmol/L    Chloride 110 100 - 111 mmol/L    CO2 28 21 - 32 mmol/L    Anion gap 5 3.0 - 18 mmol/L    Glucose 94 74 - 99 mg/dL    BUN 17 7.0 - 18 MG/DL    Creatinine 0.93 0.6 - 1.3 MG/DL    BUN/Creatinine ratio 18 12 - 20      GFR est AA >60 >60 ml/min/1.73m2    GFR est non-AA >60 >60 ml/min/1.73m2    Calcium 8.4 (L) 8.5 - 10.1 MG/DL    Bilirubin, total 0.6 0.2 - 1.0 MG/DL    ALT (SGPT) 19 13 - 56 U/L    AST (SGOT) 16 10 - 38 U/L    Alk.  phosphatase 84 45 - 117 U/L    Protein, total 7.0 6.4 - 8.2 g/dL    Albumin 3.7 3.4 - 5.0 g/dL    Globulin 3.3 2.0 - 4.0 g/dL    A-G Ratio 1.1 0.8 - 1.7     URINALYSIS W/ RFLX MICROSCOPIC    Collection Time: 07/20/20  1:40 PM   Result Value Ref Range    Color YELLOW      Appearance CLEAR      Specific gravity 1.027 1.005 - 1.030      pH (UA) 5.0 5.0 - 8.0      Protein Negative NEG mg/dL    Glucose Negative NEG mg/dL    Ketone Negative NEG mg/dL    Bilirubin Negative NEG      Blood Negative NEG      Urobilinogen 1.0 0.2 - 1.0 EU/dL    Nitrites Negative NEG      Leukocyte Esterase TRACE (A) NEG     CARDIAC PANEL,(CK, CKMB & TROPONIN)    Collection Time: 07/20/20  1:40 PM   Result Value Ref Range    CK - MB <1.0 <3.6 ng/ml    CK-MB Index  0.0 - 4.0 %     CALCULATION NOT PERFORMED WHEN RESULT IS BELOW LINEAR LIMIT    CK 86 26 - 192 U/L    Troponin-I, QT <0.02 0.0 - 0.045 NG/ML   URINE MICROSCOPIC ONLY    Collection Time: 07/20/20  1:40 PM Result Value Ref Range    WBC 3 to 5 0 - 4 /hpf    RBC NONE 0 - 5 /hpf    Epithelial cells 3+ 0 - 5 /lpf    Bacteria FEW (A) NEG /hpf   EKG, 12 LEAD, INITIAL    Collection Time: 07/20/20  1:46 PM   Result Value Ref Range    Ventricular Rate 56 BPM    Atrial Rate 56 BPM    P-R Interval 140 ms    QRS Duration 84 ms    Q-T Interval 450 ms    QTC Calculation (Bezet) 434 ms    Calculated P Axis 79 degrees    Calculated R Axis 63 degrees    Calculated T Axis -31 degrees    Diagnosis       Sinus bradycardia  Possible Left atrial enlargement  T wave abnormality, consider anterolateral ischemia  Abnormal ECG  When compared with ECG of 11-JUL-2020 20:47,  T wave inversion less evident in Inferior leads  Confirmed by Tigist Martinez ([id number]) on 7/20/2020 6:04:56 PM     CARDIAC PANEL,(CK, CKMB & TROPONIN)    Collection Time: 07/20/20  3:56 PM   Result Value Ref Range    CK - MB 1.0 <3.6 ng/ml    CK-MB Index 1.2 0.0 - 4.0 %    CK 81 26 - 192 U/L    Troponin-I, QT <0.02 0.0 - 0.045 NG/ML       Radiologic Studies -   XR CHEST PA LAT   Final Result   IMPRESSION: Hyperinflation with no acute pulmonary disease            Medical Decision Making   I am the first provider for this patient. I reviewed the vital signs, available nursing notes, past medical history, past surgical history, family history and social history. Vital Signs-Reviewed the patient's vital signs. EKG: Sinus, rate 56, normal OR, QRS and QTc; reviewed with attending no elevations to suggest ACS. Records Reviewed: Nursing Notes, Old Medical Records, Previous electrocardiograms, Previous Radiology Studies and Previous Laboratory Studies (Time of Review: 3:36 PM)    ED Course: Progress Notes, Reevaluation, and Consults:    ED Course as of Jul 20 1822   Mon Jul 20, 2020   1214 Temp: 97.5 °F (36.4 °C) [GW]   1214 Pulse (Heart Rate): 85 [GW]   1214 BP: 136/73 [GW]   1214 MAP (Calculated): 94 [GW]   1214 Resp Rate: 16 [GW]   1214 Vitals reviewed.    O2 Sat (%): 96 % [GW]   1240 Patient denies urinary pain and flank pain; well known to this service -     [GW]   1411 CXR reviewed no acute process    [GW]   1532 Patient states she can only take Percocet and morphine - states she cannot take tylenol - advised patient that percocet has tylenol in it -     [GW]   1533 Advised cannot give patient morphine in waiting room and that we have severe overcrowding at this time. [GW]   3942 UA suggests cystitis - however patient denies urinary sxs despite initial eval.   Leukocyte Esterase(!): TRACE [GW]   1638 Discussed with the resident, he is concerned there may be an element of heart failure here. I reviewed my note from 7/6, will give some Lasix here, blood pressure likely will not tolerate nitrates in high doses, consider calling cardiology for a consult here to modify medical management. Prior recent cardiac catheterization was negative    [CB]   56 Cardiology discussed case - see MDM    [GW]      ED Course User Index  [CB] Jill Oliveira MD  [GW] Yoandy Butts MD       Provider Notes (Medical Decision Making):   MDM  Number of Diagnoses or Management Options  Chronic systolic congestive heart failure ([address]):   COPD exacerbation ([address]):   Poor compliance with medication:   SOB (shortness of breath):   Diagnosis management comments: Patient ambulating around ED without acute resp distress, asking multiple providers for food, yelling at me in full sentences  See HPI for interval hx and initial complaint and eval that contributed to length of stay compounded by patient poor compliance and hx. Clinically looks volume up - lasix given here  Duonebs, steroid with improvement in air movement. Patient states she gets very anxious when trying to think about her heart condition as her mother and brother had similar issues and wants anxiety and pain meds.   Discussed with [facility] given poor hx of compliance and follow-up - trial of rx for IMDUR - one week, lasix reasonable for 3-5 days - needs to follow-up. Procedures  Davina Marc MD    Critical Care Time:       Diagnosis     Clinical Impression:   1. SOB (shortness of breath)    2. Chronic systolic congestive heart failure ([facility] [address]    3. Poor compliance with medication    4. COPD exacerbation ([facility] [address]        Disposition: Discharge, home    Follow-up Information    None          DISCHARGE NOTE:   Pt has been reexamined. Patient has no new complaints, changes, or physical findings. Care plan outlined and precautions discussed. Results were reviewed with the patient. All medications were reviewed with the patient; will d/c home with pain medication. All of pt's questions and concerns were addressed. Alarm symptoms and return precautions associated with chief complaint and evaluation were reviewed with the patient in detail. The patient demonstrated adequate understanding. Patient was instructed and agrees to follow up with PCP, as well as to return to the ED upon further deterioration. Patient is ready to go home. The patient is happy with this plan       Patient's Medications   Start Taking    ALBUTEROL SULFATE 90 MCG/ACTUATION AEBS    Take 2 Puffs by inhalation every four to six (4-6) hours as needed for Wheezing. FUROSEMIDE (LASIX) 20 MG TABLET    Take 1 Tab by mouth daily. ISOSORBIDE MONONITRATE ER (IMDUR) 30 MG TABLET    Take 1 Tab by mouth daily for 7 days. Continue Taking    ALBUTEROL-IPRATROPIUM (DUO-NEB) 2.5 MG-0.5 MG/3 ML NEBU    3 mL by Nebulization route every four (4) hours as needed for Wheezing. ALPRAZOLAM (XANAX) 0.5 MG TABLET    Take 1 Tab by mouth every eight (8) hours as needed for Anxiety. Max Daily Amount: 1.5 mg.    AMLODIPINE (NORVASC) 10 MG TABLET    Take 1 Tab by mouth daily. ARFORMOTEROL (BROVANA) 15 MCG/2 ML NEBU NEB SOLUTION    2 mL by Nebulization route two (2) times a day. ASPIRIN DELAYED-RELEASE 81 MG TABLET    Take 1 Tab by mouth daily.     ATORVASTATIN (LIPITOR) 20 MG TABLET    Take 1 Tab by mouth nightly. BUDESONIDE (PULMICORT) 0.5 MG/2 ML NBSP    2 mL by Nebulization route two (2) times a day. CLONIDINE HCL (CATAPRES) 0.2 MG TABLET    Take 1 Tab by mouth two (2) times a day. DOCUSATE SODIUM (COLACE) 100 MG CAPSULE    Take 1 Cap by mouth two (2) times a day. ESCITALOPRAM OXALATE (LEXAPRO) 10 MG TABLET    Take 1 Tab by mouth every evening. FAMOTIDINE (PEPCID) 20 MG TABLET    Take 1 Tab by mouth daily. FLUTICASONE FUROATE-VILANTEROL (BREO ELLIPTA) 200-25 MCG/DOSE INHALER    Take 1 Puff by inhalation daily. HYDROXYZINE PAMOATE (VISTARIL) 50 MG CAPSULE    Take 1 Cap by mouth three (3) times daily as needed for Anxiety for up to 10 doses. METOPROLOL TARTRATE (LOPRESSOR) 25 MG TABLET    Take 0.5 Tabs by mouth every twelve (12) hours. MONTELUKAST (SINGULAIR) 10 MG TABLET    Take 1 Tab by mouth nightly. NALOXONE (NARCAN) 4 MG/ACTUATION NASAL SPRAY    Use 1 spray intranasally, then discard. Repeat with new spray every 2 min as needed for opioid overdose symptoms, alternating nostrils. ONDANSETRON (ZOFRAN ODT) 4 MG DISINTEGRATING TABLET    Take 1 Tab by mouth every eight (8) hours as needed for Nausea. Indications: prevent nausea and vomiting after surgery    OTHER    Incentive spirometry- use as directed    OXYGEN-AIR DELIVERY SYSTEMS    3 L by IntraNASal route as needed for Other (sob). ROFLUMILAST (DALIRESP) 500 MCG TAB TABLET    Take 1 Tab by mouth daily. TIOTROPIUM (SPIRIVA) 18 MCG INHALATION CAPSULE    Take 1 Cap by inhalation daily. These Medications have changed    No medications on file   Stop Taking    ALBUTEROL (PROVENTIL HFA, VENTOLIN HFA, PROAIR HFA) 90 MCG/ACTUATION INHALER    Take 2 Puffs by inhalation every four (4) hours as needed for Wheezing. Disclaimer: Sections of this note are dictated using utilizing voice recognition software. Minor typographical errors may be present.  If questions arise, please do not hesitate to contact me or call our department.

## 2020-07-20 NOTE — ED TRIAGE NOTES
Pt arrives via EMS with c/o left upper chest pain that is reproducible after falling into a sink yesterday. Pt also with c/o urinary frequency and right sided flank pain.

## 2020-07-20 NOTE — ED NOTES
Patient discharged and given discharge instructions by Radhames Cueto MD.  Patient ambulatory from ED. Patient accompanied by self. Pt d/c'd before assessed by RN.

## 2020-07-21 ENCOUNTER — PATIENT OUTREACH (OUTPATIENT)
Dept: CASE MANAGEMENT | Age: 55
End: 2020-07-21

## 2020-07-21 NOTE — PROGRESS NOTES
Date/Time:  7/21/2020 9:19 AM  Attempted to reach patient by telephone. Left HIPPA compliant message requesting a return call. Will attempt to reach patient again.

## 2020-07-22 ENCOUNTER — PATIENT OUTREACH (OUTPATIENT)
Dept: CASE MANAGEMENT | Age: 55
End: 2020-07-22

## 2020-07-26 ENCOUNTER — APPOINTMENT (OUTPATIENT)
Dept: GENERAL RADIOLOGY | Age: 55
End: 2020-07-26
Attending: EMERGENCY MEDICINE
Payer: MEDICAID

## 2020-07-26 ENCOUNTER — HOSPITAL ENCOUNTER (EMERGENCY)
Age: 55
Discharge: HOME | End: 2020-07-26
Attending: EMERGENCY MEDICINE
Payer: MEDICAID

## 2020-07-26 ENCOUNTER — APPOINTMENT (OUTPATIENT)
Dept: CT IMAGING | Age: 55
End: 2020-07-26
Attending: PHYSICIAN ASSISTANT
Payer: MEDICAID

## 2020-07-26 ENCOUNTER — HOSPITAL ENCOUNTER (EMERGENCY)
Age: 55
Discharge: HOME | End: 2020-07-27
Attending: EMERGENCY MEDICINE
Payer: MEDICAID

## 2020-07-26 VITALS
OXYGEN SATURATION: 100 % | TEMPERATURE: 97.6 F | BODY MASS INDEX: 33.87 KG/M2 | WEIGHT: 168 LBS | SYSTOLIC BLOOD PRESSURE: 190 MMHG | DIASTOLIC BLOOD PRESSURE: 95 MMHG | HEART RATE: 65 BPM | HEIGHT: 59 IN | RESPIRATION RATE: 18 BRPM

## 2020-07-26 VITALS
RESPIRATION RATE: 16 BRPM | TEMPERATURE: 97.9 F | HEART RATE: 81 BPM | DIASTOLIC BLOOD PRESSURE: 105 MMHG | OXYGEN SATURATION: 100 % | SYSTOLIC BLOOD PRESSURE: 208 MMHG

## 2020-07-26 DIAGNOSIS — R07.9 CHEST PAIN, UNSPECIFIED TYPE: ICD-10-CM

## 2020-07-26 DIAGNOSIS — E87.6 HYPOKALEMIA: Primary | ICD-10-CM

## 2020-07-26 DIAGNOSIS — R03.0 ELEVATED BLOOD PRESSURE READING: ICD-10-CM

## 2020-07-26 DIAGNOSIS — R06.02 SOB (SHORTNESS OF BREATH): ICD-10-CM

## 2020-07-26 DIAGNOSIS — R07.9 CHEST PAIN, UNSPECIFIED TYPE: Primary | ICD-10-CM

## 2020-07-26 LAB
ALBUMIN SERPL-MCNC: 3.5 G/DL (ref 3.4–5)
ALBUMIN/GLOB SERPL: 1.1 {RATIO} (ref 0.8–1.7)
ALP SERPL-CCNC: 77 U/L (ref 45–117)
ALT SERPL-CCNC: 15 U/L (ref 13–56)
ANION GAP SERPL CALC-SCNC: 5 MMOL/L (ref 3–18)
AST SERPL-CCNC: 6 U/L (ref 10–38)
ATRIAL RATE: 68 BPM
ATRIAL RATE: 75 BPM
BASOPHILS # BLD: 0 K/UL (ref 0–0.1)
BASOPHILS NFR BLD: 0 % (ref 0–2)
BILIRUB SERPL-MCNC: 0.4 MG/DL (ref 0.2–1)
BNP SERPL-MCNC: 549 PG/ML (ref 0–900)
BUN SERPL-MCNC: 11 MG/DL (ref 7–18)
BUN/CREAT SERPL: 10 (ref 12–20)
CALCIUM SERPL-MCNC: 8.1 MG/DL (ref 8.5–10.1)
CALCULATED P AXIS, ECG09: 44 DEGREES
CALCULATED P AXIS, ECG09: 70 DEGREES
CALCULATED R AXIS, ECG10: -3 DEGREES
CALCULATED R AXIS, ECG10: -8 DEGREES
CALCULATED T AXIS, ECG11: 102 DEGREES
CALCULATED T AXIS, ECG11: 110 DEGREES
CHLORIDE SERPL-SCNC: 109 MMOL/L (ref 100–111)
CO2 SERPL-SCNC: 30 MMOL/L (ref 21–32)
CREAT SERPL-MCNC: 1.08 MG/DL (ref 0.6–1.3)
DIAGNOSIS, 93000: NORMAL
DIAGNOSIS, 93000: NORMAL
DIFFERENTIAL METHOD BLD: ABNORMAL
EOSINOPHIL # BLD: 0.1 K/UL (ref 0–0.4)
EOSINOPHIL NFR BLD: 2 % (ref 0–5)
ERYTHROCYTE [DISTWIDTH] IN BLOOD BY AUTOMATED COUNT: 12.8 % (ref 11.6–14.5)
GLOBULIN SER CALC-MCNC: 3.1 G/DL (ref 2–4)
GLUCOSE SERPL-MCNC: 137 MG/DL (ref 74–99)
HCT VFR BLD AUTO: 39.7 % (ref 35–45)
HGB BLD-MCNC: 13.2 G/DL (ref 12–16)
LYMPHOCYTES # BLD: 1.6 K/UL (ref 0.9–3.6)
LYMPHOCYTES NFR BLD: 20 % (ref 21–52)
MAGNESIUM SERPL-MCNC: 1.8 MG/DL (ref 1.6–2.6)
MCH RBC QN AUTO: 29.2 PG (ref 24–34)
MCHC RBC AUTO-ENTMCNC: 33.2 G/DL (ref 31–37)
MCV RBC AUTO: 87.8 FL (ref 74–97)
MONOCYTES # BLD: 0.4 K/UL (ref 0.05–1.2)
MONOCYTES NFR BLD: 5 % (ref 3–10)
NEUTS SEG # BLD: 5.8 K/UL (ref 1.8–8)
NEUTS SEG NFR BLD: 73 % (ref 40–73)
P-R INTERVAL, ECG05: 142 MS
P-R INTERVAL, ECG05: 148 MS
PLATELET # BLD AUTO: 216 K/UL (ref 135–420)
PMV BLD AUTO: 10 FL (ref 9.2–11.8)
POTASSIUM SERPL-SCNC: 3.2 MMOL/L (ref 3.5–5.5)
PROT SERPL-MCNC: 6.6 G/DL (ref 6.4–8.2)
Q-T INTERVAL, ECG07: 432 MS
Q-T INTERVAL, ECG07: 436 MS
QRS DURATION, ECG06: 136 MS
QRS DURATION, ECG06: 138 MS
QTC CALCULATION (BEZET), ECG08: 463 MS
QTC CALCULATION (BEZET), ECG08: 482 MS
RBC # BLD AUTO: 4.52 M/UL (ref 4.2–5.3)
SODIUM SERPL-SCNC: 144 MMOL/L (ref 136–145)
TROPONIN I SERPL-MCNC: <0.02 NG/ML (ref 0–0.04)
TROPONIN I SERPL-MCNC: <0.02 NG/ML (ref 0–0.04)
VENTRICULAR RATE, ECG03: 68 BPM
VENTRICULAR RATE, ECG03: 75 BPM
WBC # BLD AUTO: 7.8 K/UL (ref 4.6–13.2)

## 2020-07-26 PROCEDURE — 74011250636 HC RX REV CODE- 250/636: Performed by: PHYSICIAN ASSISTANT

## 2020-07-26 PROCEDURE — 74011250637 HC RX REV CODE- 250/637: Performed by: EMERGENCY MEDICINE

## 2020-07-26 PROCEDURE — 99285 EMERGENCY DEPT VISIT HI MDM: CPT

## 2020-07-26 PROCEDURE — 71046 X-RAY EXAM CHEST 2 VIEWS: CPT

## 2020-07-26 PROCEDURE — 80053 COMPREHEN METABOLIC PANEL: CPT

## 2020-07-26 PROCEDURE — 84484 ASSAY OF TROPONIN QUANT: CPT

## 2020-07-26 PROCEDURE — 74011636320 HC RX REV CODE- 636/320: Performed by: EMERGENCY MEDICINE

## 2020-07-26 PROCEDURE — 93005 ELECTROCARDIOGRAM TRACING: CPT

## 2020-07-26 PROCEDURE — 71275 CT ANGIOGRAPHY CHEST: CPT

## 2020-07-26 PROCEDURE — 99284 EMERGENCY DEPT VISIT MOD MDM: CPT

## 2020-07-26 PROCEDURE — 83880 ASSAY OF NATRIURETIC PEPTIDE: CPT

## 2020-07-26 PROCEDURE — 96374 THER/PROPH/DIAG INJ IV PUSH: CPT

## 2020-07-26 PROCEDURE — 83735 ASSAY OF MAGNESIUM: CPT

## 2020-07-26 PROCEDURE — 85025 COMPLETE CBC W/AUTO DIFF WBC: CPT

## 2020-07-26 PROCEDURE — 74011250637 HC RX REV CODE- 250/637: Performed by: PHYSICIAN ASSISTANT

## 2020-07-26 RX ORDER — OXYCODONE AND ACETAMINOPHEN 5; 325 MG/1; MG/1
1 TABLET ORAL
Qty: 4 TAB | Refills: 0 | Status: SHIPPED | OUTPATIENT
Start: 2020-07-26 | End: 2020-07-29

## 2020-07-26 RX ORDER — OXYCODONE AND ACETAMINOPHEN 5; 325 MG/1; MG/1
2 TABLET ORAL
Status: COMPLETED | OUTPATIENT
Start: 2020-07-26 | End: 2020-07-26

## 2020-07-26 RX ORDER — CLONIDINE HYDROCHLORIDE 0.1 MG/1
0.2 TABLET ORAL
Status: COMPLETED | OUTPATIENT
Start: 2020-07-26 | End: 2020-07-26

## 2020-07-26 RX ORDER — POTASSIUM CHLORIDE 20 MEQ/1
40 TABLET, EXTENDED RELEASE ORAL 2 TIMES DAILY
Status: DISCONTINUED | OUTPATIENT
Start: 2020-07-26 | End: 2020-07-26 | Stop reason: HOSPADM

## 2020-07-26 RX ORDER — MORPHINE SULFATE 4 MG/ML
4 INJECTION, SOLUTION INTRAMUSCULAR; INTRAVENOUS
Status: COMPLETED | OUTPATIENT
Start: 2020-07-26 | End: 2020-07-26

## 2020-07-26 RX ORDER — NITROGLYCERIN 0.4 MG/1
0.4 TABLET SUBLINGUAL
Status: COMPLETED | OUTPATIENT
Start: 2020-07-26 | End: 2020-07-26

## 2020-07-26 RX ADMIN — NITROGLYCERIN 0.4 MG: 0.4 TABLET SUBLINGUAL at 12:22

## 2020-07-26 RX ADMIN — MORPHINE SULFATE 4 MG: 4 INJECTION, SOLUTION INTRAMUSCULAR; INTRAVENOUS at 13:51

## 2020-07-26 RX ADMIN — POTASSIUM CHLORIDE 40 MEQ: 1500 TABLET, EXTENDED RELEASE ORAL at 13:51

## 2020-07-26 RX ADMIN — IOPAMIDOL 100 ML: 755 INJECTION, SOLUTION INTRAVENOUS at 13:07

## 2020-07-26 RX ADMIN — CLONIDINE HYDROCHLORIDE 0.2 MG: 0.1 TABLET ORAL at 10:23

## 2020-07-26 RX ADMIN — OXYCODONE HYDROCHLORIDE AND ACETAMINOPHEN 2 TABLET: 5; 325 TABLET ORAL at 10:23

## 2020-07-26 NOTE — ED PROVIDER NOTES
EMERGENCY DEPARTMENT HISTORY AND PHYSICAL EXAM    Date: 7/26/2020  Patient Name: Kayla Villegas    History of Presenting Illness     Chief Complaint   Patient presents with    Shortness of Breath         History Provided By: Patient    Chief Complaint: Chest pain, shortness of breath, anxiety  Duration: 2 days  Timing: Constant  Location: Diffuse chest  Quality: Sharp  Severity: Moderate  Modifying Factors: Worse when taking deep breath  Associated Symptoms: none       Additional History (Context): Kayla Villegas is a 63 y.o. female with a history of hypertension, COPD, CHF, tobacco abuse, asthma who presents today for history as listed above. Patient reports she is under a lot of stress because she was asked to family members 2 heart attacks within days of each other couple months ago. Patient is now worried about her own health as well. Patient denies any recent illness, fevers, chills, cough or sore throat. Reports she is still smoking cigarettes. States she tries to be compliant with her medication but is not aware successful. Patient reports chest pain is worse with a deep breath. PCP: Jose Riley NP    Current Facility-Administered Medications   Medication Dose Route Frequency Provider Last Rate Last Dose    potassium chloride (K-DUR, KLOR-CON) SR tablet 40 mEq  40 mEq Oral BID Angie Avendano PA   40 mEq at 07/26/20 1351     Current Outpatient Medications   Medication Sig Dispense Refill    oxyCODONE-acetaminophen (Percocet) 5-325 mg per tablet Take 1 Tab by mouth every six (6) hours as needed for Pain for up to 3 days. Max Daily Amount: 4 Tabs. 4 Tab 0    isosorbide mononitrate ER (IMDUR) 30 mg tablet Take 1 Tab by mouth daily for 7 days. 7 Tab 0    furosemide (Lasix) 20 mg tablet Take 1 Tab by mouth daily. 5 Tab 0    albuterol sulfate 90 mcg/actuation aebs Take 2 Puffs by inhalation every four to six (4-6) hours as needed for Wheezing.  1 Inhaler 0    albuterol-ipratropium (DUO-NEB) 2.5 mg-0.5 mg/3 ml nebu 3 mL by Nebulization route every four (4) hours as needed for Wheezing. 30 Nebule 0    hydrOXYzine pamoate (VistariL) 50 mg capsule Take 1 Cap by mouth three (3) times daily as needed for Anxiety for up to 10 doses. 10 Cap 0    cloNIDine HCL (CATAPRES) 0.2 mg tablet Take 1 Tab by mouth two (2) times a day. 30 Tab 1    amLODIPine (NORVASC) 10 mg tablet Take 1 Tab by mouth daily. 15 Tab 0    ALPRAZolam (Xanax) 0.5 mg tablet Take 1 Tab by mouth every eight (8) hours as needed for Anxiety. Max Daily Amount: 1.5 mg. 20 Tab 0    aspirin delayed-release 81 mg tablet Take 1 Tab by mouth daily. 30 Tab 0    ondansetron (ZOFRAN ODT) 4 mg disintegrating tablet Take 1 Tab by mouth every eight (8) hours as needed for Nausea. Indications: prevent nausea and vomiting after surgery 20 Tab 0    famotidine (PEPCID) 20 mg tablet Take 1 Tab by mouth daily. 30 Tab 1    OXYGEN-AIR DELIVERY SYSTEMS 3 L by IntraNASal route as needed for Other (sob).  docusate sodium (COLACE) 100 mg capsule Take 1 Cap by mouth two (2) times a day. 120 Cap 0    fluticasone furoate-vilanterol (BREO ELLIPTA) 200-25 mcg/dose inhaler Take 1 Puff by inhalation daily.  arformoterol (BROVANA) 15 mcg/2 mL nebu neb solution 2 mL by Nebulization route two (2) times a day. 60 Vial 0    budesonide (PULMICORT) 0.5 mg/2 mL nbsp 2 mL by Nebulization route two (2) times a day. 60 Each 0    tiotropium (SPIRIVA) 18 mcg inhalation capsule Take 1 Cap by inhalation daily. 30 Cap 0    atorvastatin (LIPITOR) 20 mg tablet Take 1 Tab by mouth nightly. 30 Tab 0    escitalopram oxalate (LEXAPRO) 10 mg tablet Take 1 Tab by mouth every evening. 30 Tab 0    metoprolol tartrate (LOPRESSOR) 25 mg tablet Take 0.5 Tabs by mouth every twelve (12) hours. 30 Tab 0    roflumilast (DALIRESP) 500 mcg tab tablet Take 1 Tab by mouth daily.  30 Tab 0    OTHER Incentive spirometry- use as directed 1 Each 0    naloxone (NARCAN) 4 mg/actuation nasal spray Use 1 spray intranasally, then discard. Repeat with new spray every 2 min as needed for opioid overdose symptoms, alternating nostrils. 1 Each 0    montelukast (SINGULAIR) 10 mg tablet Take 1 Tab by mouth nightly. 30 Tab 1       Past History     Past Medical History:  Past Medical History:   Diagnosis Date    Asthma     CHF (congestive heart failure) (HCC)     Chronic obstructive pulmonary disease (HCC)     Dependence on supplemental oxygen     Endocrine disease     thyroid issues    Gastrointestinal disorder     \"blockage in my stomach\"    Hypercholesterolemia     Hypertension        Past Surgical History:  Past Surgical History:   Procedure Laterality Date    HX GI      Exploratory laparotomy with lysis of adhesions and primary repair of incarcerated umbilical hernia       Family History:  No family history on file. Social History:  Social History     Tobacco Use    Smoking status: Current Every Day Smoker     Packs/day: 0.25    Smokeless tobacco: Current User   Substance Use Topics    Alcohol use: No     Comment: socially    Drug use: Not Currently     Types: Heroin       Allergies:  No Known Allergies      Review of Systems   Review of Systems   Constitutional: Negative for chills and fever. HENT: Negative for congestion, rhinorrhea and sore throat. Respiratory: Positive for shortness of breath. Negative for cough. Cardiovascular: Positive for chest pain. Gastrointestinal: Negative for abdominal pain, blood in stool, constipation, diarrhea, nausea and vomiting. Genitourinary: Negative for dysuria, frequency and hematuria. Musculoskeletal: Negative for back pain and myalgias. Skin: Negative for rash and wound. Neurological: Negative for dizziness and headaches. All other systems reviewed and are negative.     All Other Systems Negative  Physical Exam     Vitals:    07/26/20 0901 07/26/20 1417   BP: (!) 246/107 (!) 208/105   Pulse: 81    Resp: 20 16   Temp:  97.9 °F (36.6 °C)   SpO2: 100% 100%     Physical Exam  Vitals signs and nursing note reviewed. Constitutional:       General: She is not in acute distress. Appearance: She is well-developed. She is not diaphoretic. Comments: Patient is actively smoking outside, no acute respiratory distress, speaking in full sentences   HENT:      Head: Normocephalic and atraumatic. Eyes:      Conjunctiva/sclera: Conjunctivae normal.   Neck:      Musculoskeletal: Normal range of motion and neck supple. Cardiovascular:      Rate and Rhythm: Normal rate and regular rhythm. Pulses: No decreased pulses. Heart sounds: Normal heart sounds. No systolic murmur. No diastolic murmur. Pulmonary:      Effort: Pulmonary effort is normal. No respiratory distress. Breath sounds: Normal breath sounds. No stridor, decreased air movement or transmitted upper airway sounds. No decreased breath sounds, wheezing, rhonchi or rales. Chest:      Chest wall: No tenderness. Abdominal:      General: Bowel sounds are normal. There is no distension. Palpations: Abdomen is soft. Tenderness: There is no abdominal tenderness. There is no guarding or rebound. Musculoskeletal:         General: No deformity. Right lower leg: No edema. Left lower leg: No edema. Skin:     General: Skin is warm and dry. Neurological:      Mental Status: She is alert and oriented to person, place, and time. Deep Tendon Reflexes: Reflexes are normal and symmetric. Psychiatric:         Mood and Affect: Mood is anxious.            Diagnostic Study Results     Labs -     Recent Results (from the past 12 hour(s))   CBC WITH AUTOMATED DIFF    Collection Time: 07/26/20 10:19 AM   Result Value Ref Range    WBC 7.8 4.6 - 13.2 K/uL    RBC 4.52 4.20 - 5.30 M/uL    HGB 13.2 12.0 - 16.0 g/dL    HCT 39.7 35.0 - 45.0 %    MCV 87.8 74.0 - 97.0 FL    MCH 29.2 24.0 - 34.0 PG    MCHC 33.2 31.0 - 37.0 g/dL    RDW 12.8 11.6 - 14.5 %    PLATELET 118 135 - 420 K/uL    MPV 10.0 9.2 - 11.8 FL    NEUTROPHILS 73 40 - 73 %    LYMPHOCYTES 20 (L) 21 - 52 %    MONOCYTES 5 3 - 10 %    EOSINOPHILS 2 0 - 5 %    BASOPHILS 0 0 - 2 %    ABS. NEUTROPHILS 5.8 1.8 - 8.0 K/UL    ABS. LYMPHOCYTES 1.6 0.9 - 3.6 K/UL    ABS. MONOCYTES 0.4 0.05 - 1.2 K/UL    ABS. EOSINOPHILS 0.1 0.0 - 0.4 K/UL    ABS. BASOPHILS 0.0 0.0 - 0.1 K/UL    DF AUTOMATED     METABOLIC PANEL, COMPREHENSIVE    Collection Time: 07/26/20 10:19 AM   Result Value Ref Range    Sodium 144 136 - 145 mmol/L    Potassium 3.2 (L) 3.5 - 5.5 mmol/L    Chloride 109 100 - 111 mmol/L    CO2 30 21 - 32 mmol/L    Anion gap 5 3.0 - 18 mmol/L    Glucose 137 (H) 74 - 99 mg/dL    BUN 11 7.0 - 18 MG/DL    Creatinine 1.08 0.6 - 1.3 MG/DL    BUN/Creatinine ratio 10 (L) 12 - 20      GFR est AA >60 >60 ml/min/1.73m2    GFR est non-AA 52 (L) >60 ml/min/1.73m2    Calcium 8.1 (L) 8.5 - 10.1 MG/DL    Bilirubin, total 0.4 0.2 - 1.0 MG/DL    ALT (SGPT) 15 13 - 56 U/L    AST (SGOT) 6 (L) 10 - 38 U/L    Alk.  phosphatase 77 45 - 117 U/L    Protein, total 6.6 6.4 - 8.2 g/dL    Albumin 3.5 3.4 - 5.0 g/dL    Globulin 3.1 2.0 - 4.0 g/dL    A-G Ratio 1.1 0.8 - 1.7     TROPONIN I    Collection Time: 07/26/20 10:19 AM   Result Value Ref Range    Troponin-I, QT <0.02 0.0 - 0.045 NG/ML   NT-PRO BNP    Collection Time: 07/26/20 10:19 AM   Result Value Ref Range    NT pro- 0 - 900 PG/ML   MAGNESIUM    Collection Time: 07/26/20 10:19 AM   Result Value Ref Range    Magnesium 1.8 1.6 - 2.6 mg/dL   EKG, 12 LEAD, SUBSEQUENT    Collection Time: 07/26/20 12:04 PM   Result Value Ref Range    Ventricular Rate 68 BPM    Atrial Rate 68 BPM    P-R Interval 142 ms    QRS Duration 138 ms    Q-T Interval 436 ms    QTC Calculation (Bezet) 463 ms    Calculated P Axis 44 degrees    Calculated R Axis -8 degrees    Calculated T Axis 110 degrees    Diagnosis       Normal sinus rhythm  Possible Left atrial enlargement  Left bundle branch block  Abnormal ECG     EKG, 12 LEAD, INITIAL    Collection Time: 07/26/20 12:18 PM   Result Value Ref Range    Ventricular Rate 75 BPM    Atrial Rate 75 BPM    P-R Interval 148 ms    QRS Duration 136 ms    Q-T Interval 432 ms    QTC Calculation (Bezet) 482 ms    Calculated P Axis 70 degrees    Calculated R Axis -3 degrees    Calculated T Axis 102 degrees    Diagnosis       Normal sinus rhythm  Possible Left atrial enlargement  Left bundle branch block  Abnormal ECG  When compared with ECG of 26-JUL-2020 12:04,  No significant change was found     TROPONIN I    Collection Time: 07/26/20  1:18 PM   Result Value Ref Range    Troponin-I, QT <0.02 0.0 - 0.045 NG/ML       Radiologic Studies -   CTA CHEST W OR W WO CONT   Final Result   IMPRESSION:   1. No convincing evidence for pulmonary embolus. 2. Perhaps trace diffuse bronchial wall thickening. 3. Mild cardiomegaly. 4. Enlarged heterogeneous/multinodular thyroid gland, perhaps slightly more   prominent when compared to 2017 exam. Similar mild narrowing of the trachea. Nonemergent thyroid ultrasound may be helpful for further evaluation. XR CHEST PA LAT   Final Result   IMPRESSION:       1. No acute finding. 2. Hyperinflation, possibly indicative of COPD. Eran Angry CT Results  (Last 48 hours)               07/26/20 1306  CTA CHEST W OR W WO CONT Final result    Impression:  IMPRESSION:   1. No convincing evidence for pulmonary embolus. 2. Perhaps trace diffuse bronchial wall thickening. 3. Mild cardiomegaly. 4. Enlarged heterogeneous/multinodular thyroid gland, perhaps slightly more   prominent when compared to 2017 exam. Similar mild narrowing of the trachea. Nonemergent thyroid ultrasound may be helpful for further evaluation. Narrative:  CTA CHEST PULMONARY EMBOLISM PROTOCOL        INDICATION: Dyspnea. Chest pain, pleurisy. Question pulmonary embolism.        COMPARISON: Correlation with CT abdomen/pelvis 6/14/2020, CT chest 12/19/2017       TECHNIQUE:  With IV administration of 100 ml Isovue-370, axial CT images through   the thorax were obtained using helical technique following a pulmonary embolism   protocol. In order more optimally to evaluate the pulmonary arterial tree in   multiplanar CT angiographic fashion, coronal and sagittal reformation maximum   intensity projection (MIP) images were also performed. Subsequently, serial   axial CT images were obtained of the abdomen and pelvis. All CT scans at this   facility are performed using dose optimization technique as appropriate to a   performed exam, to include automated exposure control, adjustment of the mA   and/or kV according to patient's size (including appropriate matching for   site-specific examinations), or use of iterative reconstruction technique. CHEST FINDINGS:   -Pulmonary Arteries: No filling defects are appreciated within the main, left,   right, lobar or visualized segmental pulmonary arteries to suggest embolism.         -Mediastinum: Enlarged heterogeneous/multinodular thyroid gland causes mild   narrowing of the trachea, right lobe of thyroid more prominent than the left no   evidence for thoracic aortic aneurysm or dissection. No CT evidence for right   heart strain. Mild cardiomegaly. No pericardial effusion.         -Lymph Nodes: No mediastinal, axillary, or hilar adenopathy       -Lungs: Perhaps trace bronchial wall thickening. Bandlike atelectasis at the   left lower lobe. No focal consolidation, pleural effusion, or pneumothorax. -Upper abdomen: Limited evaluation due to streak artifact from patient's right   upper extremity. Hepatic and left renal cysts are again noted. -Bones: No acute osseous abnormalities are identified. CXR Results  (Last 48 hours)               07/26/20 0921  XR CHEST PA LAT Final result    Impression:  IMPRESSION:       1. No acute finding. 2. Hyperinflation, possibly indicative of COPD. Fabiana Bulls                Narrative:  Chest  PA and lateral views       INDICATION:  Dyspnea. Possible Covid 19       COMPARISON:  Prior chest x-rays, most recent 7/20/2020       FINDINGS:  The cardiac silhouette is normal in size. Atherosclerosis noted. The   pulmonary vasculature is unremarkable. No focal consolidation, pleural   effusion, or pneumothorax. Lungs are hyperinflated. No acute osseous   abnormalities are identified. Chronic wedging of a few mid level thoracic   vertebral bodies. Medical Decision Making   I am the first provider for this patient. I reviewed the vital signs, available nursing notes, past medical history, past surgical history, family history and social history. Vital Signs-Reviewed the patient's vital signs. Records Reviewed: Nursing Notes and Old Medical Records     Procedures: None   Procedures    Provider Notes (Medical Decision Making):     Differential: Hypertensive urgency, hypertensive emergency, PE, pneumonia, ACS, arrhythmia    Plan: We will order full cardiac work-up and CTA of chest to rule out PE. Will order pain medicine and clonidine. Patient requesting Percocet and morphine, will order Percocet. 12:08 PM  Going in and out of the emergency department, patient is very well-known to our emergency department and has used IV drugs with an IV in the past.  Patient's IV was taken out by the triage nurse, this made the patient very upset. Patient does agree to try for second IV and has agreed that if she goes outside again we will have to take the IV out and she will not be able to receive a CTA. Patient is agreeable and this was witnessed by of any tech. 2:39 PM  Discussed reassuring CTA with patient. Have advised ultrasound follow-up for thyroid. Have agreed to give for Percocet to go home with. Strict return precautions have been given. Repeat treat was within normal limits.  Case has been discussed extensively with Dr. Gage Stewart       MED RECONCILIATION:  Current Facility-Administered Medications   Medication Dose Route Frequency    potassium chloride (K-DUR, KLOR-CON) SR tablet 40 mEq  40 mEq Oral BID     Current Outpatient Medications   Medication Sig    oxyCODONE-acetaminophen (Percocet) 5-325 mg per tablet Take 1 Tab by mouth every six (6) hours as needed for Pain for up to 3 days. Max Daily Amount: 4 Tabs.  isosorbide mononitrate ER (IMDUR) 30 mg tablet Take 1 Tab by mouth daily for 7 days.  furosemide (Lasix) 20 mg tablet Take 1 Tab by mouth daily.  albuterol sulfate 90 mcg/actuation aebs Take 2 Puffs by inhalation every four to six (4-6) hours as needed for Wheezing.  albuterol-ipratropium (DUO-NEB) 2.5 mg-0.5 mg/3 ml nebu 3 mL by Nebulization route every four (4) hours as needed for Wheezing.  hydrOXYzine pamoate (VistariL) 50 mg capsule Take 1 Cap by mouth three (3) times daily as needed for Anxiety for up to 10 doses.  cloNIDine HCL (CATAPRES) 0.2 mg tablet Take 1 Tab by mouth two (2) times a day.  amLODIPine (NORVASC) 10 mg tablet Take 1 Tab by mouth daily.  ALPRAZolam (Xanax) 0.5 mg tablet Take 1 Tab by mouth every eight (8) hours as needed for Anxiety. Max Daily Amount: 1.5 mg.    aspirin delayed-release 81 mg tablet Take 1 Tab by mouth daily.  ondansetron (ZOFRAN ODT) 4 mg disintegrating tablet Take 1 Tab by mouth every eight (8) hours as needed for Nausea. Indications: prevent nausea and vomiting after surgery    famotidine (PEPCID) 20 mg tablet Take 1 Tab by mouth daily.  OXYGEN-AIR DELIVERY SYSTEMS 3 L by IntraNASal route as needed for Other (sob).  docusate sodium (COLACE) 100 mg capsule Take 1 Cap by mouth two (2) times a day.  fluticasone furoate-vilanterol (BREO ELLIPTA) 200-25 mcg/dose inhaler Take 1 Puff by inhalation daily.  arformoterol (BROVANA) 15 mcg/2 mL nebu neb solution 2 mL by Nebulization route two (2) times a day.     budesonide (PULMICORT) 0.5 mg/2 mL nbsp 2 mL by Nebulization route two (2) times a day.    tiotropium (SPIRIVA) 18 mcg inhalation capsule Take 1 Cap by inhalation daily.  atorvastatin (LIPITOR) 20 mg tablet Take 1 Tab by mouth nightly.  escitalopram oxalate (LEXAPRO) 10 mg tablet Take 1 Tab by mouth every evening.  metoprolol tartrate (LOPRESSOR) 25 mg tablet Take 0.5 Tabs by mouth every twelve (12) hours.  roflumilast (DALIRESP) 500 mcg tab tablet Take 1 Tab by mouth daily.  OTHER Incentive spirometry- use as directed    naloxone (NARCAN) 4 mg/actuation nasal spray Use 1 spray intranasally, then discard. Repeat with new spray every 2 min as needed for opioid overdose symptoms, alternating nostrils.  montelukast (SINGULAIR) 10 mg tablet Take 1 Tab by mouth nightly. Disposition:  Home     DISCHARGE NOTE:   Pt has been reexamined. Patient has no new complaints, changes, or physical findings. Care plan outlined and precautions discussed. Results of workup were reviewed with the patient. All medications were reviewed with the patient. All of pt's questions and concerns were addressed. Patient was instructed and agrees to follow up with PCP as well as to return to the ED upon further deterioration. Patient is ready to go home. Follow-up Information     Follow up With Specialties Details Why Contact Info    [facility] EMERGENCY DEPT Emergency Medicine  As needed [address] [place] [address]    Mani Melendrez NP Nurse Practitioner Schedule an appointment as soon as possible for a visit  [address] [facility] [id number] [phone]            Current Discharge Medication List      START taking these medications    Details   oxyCODONE-acetaminophen (Percocet) 5-325 mg per tablet Take 1 Tab by mouth every six (6) hours as needed for Pain for up to 3 days. Max Daily Amount: 4 Tabs. Qty: 4 Tab, Refills: 0    Associated Diagnoses: Elevated blood pressure reading                 Diagnosis     Clinical Impression:   1. Hypokalemia    2.  Chest pain, unspecified type    3. SOB (shortness of breath)    4. Elevated blood pressure reading          \"Please note that this dictation was completed with [employer], the computer voice recognition software. Quite often unanticipated grammatical, syntax, homophones, and other interpretive errors are inadvertently transcribed by the computer software. Please disregard these errors. Please excuse any errors that have escaped final proofreading. \"

## 2020-07-26 NOTE — DISCHARGE INSTRUCTIONS
Patient Education        Chest Pain: Care Instructions  Your Care Instructions     There are many things that can cause chest pain. Some are not serious and will get better on their own in a few days. But some kinds of chest pain need more testing and treatment. Your doctor may have recommended a follow-up visit in the next 8 to 12 hours. If you are not getting better, you may need more tests or treatment. Even though your doctor has released you, you still need to watch for any problems. The doctor carefully checked you, but sometimes problems can develop later. If you have new symptoms or if your symptoms do not get better, get medical care right away. If you have worse or different chest pain or pressure that lasts more than 5 minutes or you passed out (lost consciousness), aaky875 or seek other emergency help right away. A medical visit is only one step in your treatment. Even if you feel better, you still need to do what your doctor recommends, such as going to all suggested follow-up appointments and taking medicines exactly as directed. This will help you recover and help prevent future problems. How can you care for yourself at home? · Rest until you feel better. · Take your medicine exactly as prescribed. Call your doctor if you think you are having a problem with your medicine. · Do not drive after taking a prescription pain medicine. When should you call for help? [id number]:   · You passed out (lost consciousness). · You have severe difficulty breathing. · You have symptoms of a heart attack. These may include:  ? Chest pain or pressure, or a strange feeling in your chest.  ? Sweating. ? Shortness of breath. ? Nausea or vomiting. ? Pain, pressure, or a strange feeling in your back, neck, jaw, or upper belly or in one or both shoulders or arms. ? Lightheadedness or sudden weakness. ? A fast or irregular heartbeat.   After you call 911, the  may tell you to chew 1 adult-strength or 2 to 4 low-dose aspirin. Wait for an ambulance. Do not try to drive yourself. Call your doctor today if:   · You have any trouble breathing. · Your chest pain gets worse. · You are dizzy or lightheaded, or you feel like you may faint. · You are not getting better as expected. · You are having new or different chest pain. Where can you learn more? Go to http://www.ash.com/  Enter A120 in the search box to learn more about \"Chest Pain: Care Instructions. \"  Current as of: June 26, 2019               Content Version: 12.5  © 9119-0487 [employer]. Care instructions adapted under license by [employer] (which disclaims liability or warranty for this information). If you have questions about a medical condition or this instruction, always ask your healthcare professional. [address] any warranty or liability for your use of this information.

## 2020-07-26 NOTE — ED NOTES
Pt outside of ER talking to people in the car.  IV was still in place, upon arrival back into department, IV removed

## 2020-07-26 NOTE — ED TRIAGE NOTES
Pt arrived via ems from home with complaints of SOB. Pt received albuterol and a duo neb in medic.  Pt was tripoding upon arrival

## 2020-07-26 NOTE — ED NOTES
I performed a brief evaluation, including history and physical, of the patient here in triage and I have determined that pt will need further treatment and evaluation from the main side ER physician. I have placed initial orders to help in expediting patients care. July 26, 2020 at 9:04 AM - Zara Forrester MD        Visit Vitals  BP (!) 246/107   Pulse 81   Resp 20   SpO2 100%        Patient is comfortable at this time we will give her her clonidine and follow her cardiac labs and chest x-ray.   Michel Plunkett,  9:04 AM

## 2020-07-27 ENCOUNTER — HOSPITAL ENCOUNTER (EMERGENCY)
Age: 55
Discharge: HOME | End: 2020-07-28
Attending: EMERGENCY MEDICINE
Payer: MEDICAID

## 2020-07-27 VITALS
RESPIRATION RATE: 22 BRPM | DIASTOLIC BLOOD PRESSURE: 97 MMHG | SYSTOLIC BLOOD PRESSURE: 204 MMHG | OXYGEN SATURATION: 98 % | TEMPERATURE: 98.8 F

## 2020-07-27 DIAGNOSIS — J44.1 COPD EXACERBATION (HCC): Primary | ICD-10-CM

## 2020-07-27 DIAGNOSIS — I10 ESSENTIAL HYPERTENSION: ICD-10-CM

## 2020-07-27 LAB
ATRIAL RATE: 67 BPM
CALCULATED P AXIS, ECG09: 80 DEGREES
CALCULATED R AXIS, ECG10: 83 DEGREES
CALCULATED T AXIS, ECG11: 52 DEGREES
DIAGNOSIS, 93000: NORMAL
P-R INTERVAL, ECG05: 154 MS
Q-T INTERVAL, ECG07: 466 MS
QRS DURATION, ECG06: 142 MS
QTC CALCULATION (BEZET), ECG08: 492 MS
VENTRICULAR RATE, ECG03: 67 BPM

## 2020-07-27 PROCEDURE — 99283 EMERGENCY DEPT VISIT LOW MDM: CPT

## 2020-07-27 PROCEDURE — 94640 AIRWAY INHALATION TREATMENT: CPT

## 2020-07-27 RX ORDER — IPRATROPIUM BROMIDE AND ALBUTEROL SULFATE 2.5; .5 MG/3ML; MG/3ML
3 SOLUTION RESPIRATORY (INHALATION)
Status: COMPLETED | OUTPATIENT
Start: 2020-07-27 | End: 2020-07-28

## 2020-07-27 RX ORDER — KETOROLAC TROMETHAMINE 15 MG/ML
15 INJECTION, SOLUTION INTRAMUSCULAR; INTRAVENOUS
Status: DISCONTINUED | OUTPATIENT
Start: 2020-07-27 | End: 2020-07-27 | Stop reason: HOSPADM

## 2020-07-27 NOTE — DISCHARGE INSTRUCTIONS
SPECIFIC PATIENT INSTRUCTIONS FROM THE EMERGENCY PHYSICIAN WHO TREATED YOU TODAY:  1. Return if worse. 2. Follow up with your primary doctor or your cardiologist (if you have one) in the next 2-4 days for reevaluation. Patient Education        Chest Pain: Care Instructions  Your Care Instructions     There are many things that can cause chest pain. Some are not serious and will get better on their own in a few days. But some kinds of chest pain need more testing and treatment. Your doctor may have recommended a follow-up visit in the next 8 to 12 hours. If you are not getting better, you may need more tests or treatment. Even though your doctor has released you, you still need to watch for any problems. The doctor carefully checked you, but sometimes problems can develop later. If you have new symptoms or if your symptoms do not get better, get medical care right away. If you have worse or different chest pain or pressure that lasts more than 5 minutes or you passed out (lost consciousness), [id number] or seek other emergency help right away. A medical visit is only one step in your treatment. Even if you feel better, you still need to do what your doctor recommends, such as going to all suggested follow-up appointments and taking medicines exactly as directed. This will help you recover and help prevent future problems. How can you care for yourself at home? · Rest until you feel better. · Take your medicine exactly as prescribed. Call your doctor if you think you are having a problem with your medicine. · Do not drive after taking a prescription pain medicine. When should you call for help? [id number]:   · You passed out (lost consciousness). · You have severe difficulty breathing. · You have symptoms of a heart attack. These may include:  ? Chest pain or pressure, or a strange feeling in your chest.  ? Sweating. ? Shortness of breath. ? Nausea or vomiting.   ? Pain, pressure, or a strange feeling in your back, neck, jaw, or upper belly or in one or both shoulders or arms. ? Lightheadedness or sudden weakness. ? A fast or irregular heartbeat. After you call 911, the  may tell you to chew 1 adult-strength or 2 to 4 low-dose aspirin. Wait for an ambulance. Do not try to drive yourself. Call your doctor today if:   · You have any trouble breathing. · Your chest pain gets worse. · You are dizzy or lightheaded, or you feel like you may faint. · You are not getting better as expected. · You are having new or different chest pain. Where can you learn more? Go to http://magda-kadie.info/  Enter A120 in the search box to learn more about \"Chest Pain: Care Instructions. \"  Current as of: June 26, 2019               Content Version: 12.5  © 5560-8350 [employer]. Care instructions adapted under license by [employer] (which disclaims liability or warranty for this information). If you have questions about a medical condition or this instruction, always ask your healthcare professional. [address] any warranty or liability for your use of this information. [employer] Activation    Thank you for requesting access to [employer]. Please follow the instructions below to securely access and download your online medical record. [employer] allows you to send messages to your doctor, view your test results, renew your prescriptions, schedule appointments, and more. How Do I Sign Up? In your internet browser, go to [email or web address]. Click on the First Time User? Click Here link in the Sign In box. You will see the New Member Sign Up page. Enter your [employer] Access Code exactly as it appears below. You will not need to use this code after you´ve completed the sign-up process. If you do not sign up before the expiration date, you must request a new code.     Th[employer] Access Code: [email or web address]  Expires: 3/28/2019 2:27 PM (This is the date your meets access code will )    Enter the last four digits of your Social Security Number (xxxx) and Date of Birth (mm/dd/yyyy) as indicated and click Submit. You will be taken to the next sign-up page. Create a New.nett ID. This will be your meets login ID and cannot be changed, so think of one that is secure and easy to remember. Create a meets password. You can change your password at any time. Enter your Password Reset Question and Answer. This can be used at a later time if you forget your password. Enter your e-mail address. You will receive e-mail notification when new information is available in [address] Click Sign Up. You can now view and download portions of your medical record. Click the [employer] link to download a portable copy of your medical information. Additional Information    If you have questions, please visit the Frequently Asked Questions section of the meets website at [email or web address] Remember, meets is NOT to be used for urgent needs. For medical emergencies, dial 911.

## 2020-07-27 NOTE — ED NOTES
Pt continues to be beligerent to ED staff because she wants IV pain medication, pt was spoken to by doctor and told she was not getting narcotics, pt continues to state that she was not seen, pt cursing and yelling at staff.  Pt discharged due to beligerant behavior, asking for nursing supervisoe

## 2020-07-27 NOTE — ED PROVIDER NOTES
[facility] EMERGENCY DEPT      12:20 AM    Date: 7/26/2020  Patient Name: Parvez Chawla    History of Presenting Illness     Chief Complaint   Patient presents with    Chest Pain       63 y.o. female with noted past medical history who presents to the emergency department with chest pain that started earlier today after being discharged. Patient was seen earlier today at [facility] for chest pain or shortness of breath and had extensive evaluation done including serum labs, chest x-ray and CT of the chest.  At that time nothing acute was found and the patient was discharged home. She returns to the ER with some mild chest pain that started earlier today and now has resolved. Of note the patient is frequent as a patient in an [facility] emergency department. She is requesting morphine for pain to the staff before even being seen by the physician. Patient denies any other associated signs or symptoms. Patient denies any other complaints. Nursing notes regarding the HPI and triage nursing notes were reviewed. Prior medical records were reviewed. Current Outpatient Medications   Medication Sig Dispense Refill    oxyCODONE-acetaminophen (Percocet) 5-325 mg per tablet Take 1 Tab by mouth every six (6) hours as needed for Pain for up to 3 days. Max Daily Amount: 4 Tabs. 4 Tab 0    isosorbide mononitrate ER (IMDUR) 30 mg tablet Take 1 Tab by mouth daily for 7 days. 7 Tab 0    furosemide (Lasix) 20 mg tablet Take 1 Tab by mouth daily. 5 Tab 0    albuterol sulfate 90 mcg/actuation aebs Take 2 Puffs by inhalation every four to six (4-6) hours as needed for Wheezing. 1 Inhaler 0    albuterol-ipratropium (DUO-NEB) 2.5 mg-0.5 mg/3 ml nebu 3 mL by Nebulization route every four (4) hours as needed for Wheezing. 30 Nebule 0    hydrOXYzine pamoate (VistariL) 50 mg capsule Take 1 Cap by mouth three (3) times daily as needed for Anxiety for up to 10 doses.  10 Cap 0  cloNIDine HCL (CATAPRES) 0.2 mg tablet Take 1 Tab by mouth two (2) times a day. 30 Tab 1    amLODIPine (NORVASC) 10 mg tablet Take 1 Tab by mouth daily. 15 Tab 0    ALPRAZolam (Xanax) 0.5 mg tablet Take 1 Tab by mouth every eight (8) hours as needed for Anxiety. Max Daily Amount: 1.5 mg. 20 Tab 0    aspirin delayed-release 81 mg tablet Take 1 Tab by mouth daily. 30 Tab 0    ondansetron (ZOFRAN ODT) 4 mg disintegrating tablet Take 1 Tab by mouth every eight (8) hours as needed for Nausea. Indications: prevent nausea and vomiting after surgery 20 Tab 0    famotidine (PEPCID) 20 mg tablet Take 1 Tab by mouth daily. 30 Tab 1    OXYGEN-AIR DELIVERY SYSTEMS 3 L by IntraNASal route as needed for Other (sob).  docusate sodium (COLACE) 100 mg capsule Take 1 Cap by mouth two (2) times a day. 120 Cap 0    fluticasone furoate-vilanterol (BREO ELLIPTA) 200-25 mcg/dose inhaler Take 1 Puff by inhalation daily.  arformoterol (BROVANA) 15 mcg/2 mL nebu neb solution 2 mL by Nebulization route two (2) times a day. 60 Vial 0    budesonide (PULMICORT) 0.5 mg/2 mL nbsp 2 mL by Nebulization route two (2) times a day. 60 Each 0    tiotropium (SPIRIVA) 18 mcg inhalation capsule Take 1 Cap by inhalation daily. 30 Cap 0    atorvastatin (LIPITOR) 20 mg tablet Take 1 Tab by mouth nightly. 30 Tab 0    escitalopram oxalate (LEXAPRO) 10 mg tablet Take 1 Tab by mouth every evening. 30 Tab 0    metoprolol tartrate (LOPRESSOR) 25 mg tablet Take 0.5 Tabs by mouth every twelve (12) hours. 30 Tab 0    roflumilast (DALIRESP) 500 mcg tab tablet Take 1 Tab by mouth daily. 30 Tab 0    OTHER Incentive spirometry- use as directed 1 Each 0    naloxone (NARCAN) 4 mg/actuation nasal spray Use 1 spray intranasally, then discard. Repeat with new spray every 2 min as needed for opioid overdose symptoms, alternating nostrils. 1 Each 0    montelukast (SINGULAIR) 10 mg tablet Take 1 Tab by mouth nightly.  30 Tab 1       Past History Past Medical History:  Past Medical History:   Diagnosis Date    Asthma     CHF (congestive heart failure) (HCC)     Chronic obstructive pulmonary disease (HCC)     Dependence on supplemental oxygen     Endocrine disease     thyroid issues    Gastrointestinal disorder     \"blockage in my stomach\"    Hypercholesterolemia     Hypertension        Past Surgical History:  Past Surgical History:   Procedure Laterality Date    HX GI      Exploratory laparotomy with lysis of adhesions and primary repair of incarcerated umbilical hernia       Family History:  No family history on file. Social History:  Social History     Tobacco Use    Smoking status: Current Every Day Smoker     Packs/day: 0.25    Smokeless tobacco: Current User   Substance Use Topics    Alcohol use: No     Comment: socially    Drug use: Not Currently     Types: Heroin       Allergies:  No Known Allergies    Patient's primary care provider (as noted in EPIC):  Claudine Kumar NP    Review of Systems   Constitutional: Negative for diaphoresis. HENT: Negative for congestion. Eyes: Negative for discharge. Respiratory: Negative for stridor. Cardiovascular: Negative for palpitations. Gastrointestinal: Negative for diarrhea. Endocrine: Negative for heat intolerance. Genitourinary: Negative for flank pain. Musculoskeletal: Negative for back pain. Neurological: Negative for weakness. Psychiatric/Behavioral: Negative for hallucinations. All other systems reviewed and are negative. Visit Vitals  BP (!) 190/95 (BP 1 Location: Left arm, BP Patient Position: At rest)   Pulse 65   Temp 97.6 °F (36.4 °C)   Resp 18   Ht 4' 11\" (1.499 m)   Wt 76.2 kg (168 lb)   SpO2 100%   BMI 33.93 kg/m²       PHYSICAL EXAM:    CONSTITUTIONAL:  Alert, in no apparent distress;  well developed;  well nourished. HEAD:  Normocephalic, atraumatic. EYES:  EOMI. Non-icteric sclera. Normal conjunctiva. ENTM:  Nose:  no rhinorrhea.   Throat:  no erythema or exudate, mucous membranes moist.  NECK:  No JVD. Supple  RESPIRATORY:  Chest clear, equal breath sounds, good air movement. CARDIOVASCULAR:  Regular rate and rhythm. No murmurs, rubs, or gallops. Chest:  No rash, lesions, bruising. No reproducible tenderness to palpation. GI:  Normal bowel sounds, abdomen soft and non-tender. No rebound or guarding. BACK:  Non-tender. UPPER EXT:  Normal inspection. LOWER EXT:  No edema, no calf tenderness. Distal pulses intact. NEURO:  Moves all four extremities, and grossly normal motor exam.  SKIN:  No rashes;  Normal for age. PSYCH:  Alert and normal affect. DIFFERENTIAL DIAGNOSES/ MEDICAL DECISION MAKING:  Chest pain etiologies include acute cardiac events to include possible acute myocardial infarction, acute coronary syndrome, pneumonia, chest wall pain (myofascial/ musculoskeletal etiology), chronic obstructive pulmonary disease (copd), acute asthma exacerbation, congestive heart failure, acute bronchitis, pulmonary embolism, upper respiratory infection, referred abdominal pain, other etiologies, versus combination of the above. Diagnostic Study Results     Abnormal lab results from this emergency department encounter:  Labs Reviewed - No data to display    Lab values for this patient within approximately the last 12 hours:  Recent Results (from the past 12 hour(s))   TROPONIN I    Collection Time: 07/26/20  1:18 PM   Result Value Ref Range    Troponin-I, QT <0.02 0.0 - 0.045 NG/ML       Radiologist and cardiologist interpretations if available at time of this note:  Xr Chest Pa Lat    Result Date: 7/26/2020  Chest  PA and lateral views INDICATION:  Dyspnea. Possible Covid 19 COMPARISON:  Prior chest x-rays, most recent 7/20/2020 FINDINGS:  The cardiac silhouette is normal in size. Atherosclerosis noted. The pulmonary vasculature is unremarkable. No focal consolidation, pleural effusion, or pneumothorax. Lungs are hyperinflated.   No acute osseous abnormalities are identified. Chronic wedging of a few mid level thoracic vertebral bodies. IMPRESSION:  1. No acute finding. 2. Hyperinflation, possibly indicative of COPD. [facility] Or W Wo Cont    Result Date: 7/26/2020  CTA CHEST PULMONARY EMBOLISM PROTOCOL INDICATION: Dyspnea. Chest pain, pleurisy. Question pulmonary embolism. COMPARISON: Correlation with CT abdomen/pelvis 6/14/2020, CT chest 12/19/2017 TECHNIQUE:  With IV administration of 100 ml Isovue-370, axial CT images through the thorax were obtained using helical technique following a pulmonary embolism protocol. In order more optimally to evaluate the pulmonary arterial tree in multiplanar CT angiographic fashion, coronal and sagittal reformation maximum intensity projection (MIP) images were also performed. Subsequently, serial axial CT images were obtained of the abdomen and pelvis. All CT scans at this facility are performed using dose optimization technique as appropriate to a performed exam, to include automated exposure control, adjustment of the mA and/or kV according to patient's size (including appropriate matching for site-specific examinations), or use of iterative reconstruction technique. CHEST FINDINGS: -Pulmonary Arteries: No filling defects are appreciated within the main, left, right, lobar or visualized segmental pulmonary arteries to suggest embolism.  -Mediastinum: Enlarged heterogeneous/multinodular thyroid gland causes mild narrowing of the trachea, right lobe of thyroid more prominent than the left no evidence for thoracic aortic aneurysm or dissection. No CT evidence for right heart strain. Mild cardiomegaly. No pericardial effusion.  -Lymph Nodes: No mediastinal, axillary, or hilar adenopathy -Lungs: Perhaps trace bronchial wall thickening. Bandlike atelectasis at the left lower lobe. No focal consolidation, pleural effusion, or pneumothorax.  -Upper abdomen: Limited evaluation due to streak artifact from patient's right upper extremity. Hepatic and left renal cysts are again noted. -Bones: No acute osseous abnormalities are identified. IMPRESSION: 1. No convincing evidence for pulmonary embolus. 2. Perhaps trace diffuse bronchial wall thickening. 3. Mild cardiomegaly. 4. Enlarged heterogeneous/multinodular thyroid gland, perhaps slightly more prominent when compared to 2017 exam. Similar mild narrowing of the trachea. Nonemergent thyroid ultrasound may be helpful for further evaluation. Medication(s) ordered for patient during this emergency visit encounter:  Medications - No data to display    Medical Decision Making     I am the first provider for this patient. I reviewed the vital signs, available nursing notes, past medical history, past surgical history, family history and social history. Vital Signs:  Reviewed the patient's vital signs. Initial EKG interpretation by attending emergency physician: Normal sinus rhythm about 65 beats minute with left bundle branch block. ED COURSE:    1:06 AM  Nursing staff came to me and states the patient still verbally abusive and swearing at them. Given the patient's continued verbal abuse, patient will be discharged with security escorted her out of the premises. IMPRESSION AND MEDICAL DECISION MAKING:  Based upon the patients presentation with noted HPI and PE, along with the work up done in the emergency department, I believe that the patient is having non-cardiac chest pain as noted. Given the time frame of the patients chest pain, an acute cardiac event could be ruled out with one set of normal cardiac enzymes. DIAGNOSIS:  1. Chest pain    SPECIFIC PATIENT INSTRUCTIONS FROM THE EMERGENCY PHYSICIAN WHO TREATED YOU TODAY:  1. Return if worse. 2. Follow up with your primary doctor or your cardiologist (if you have one) in the next 2-4 days for reevaluation. Patient is improved, resting quietly and comfortably.   The patient will be discharged home. The patient was reassured that these symptoms do not appear to represent a serious or life threatening condition at this time. Warning signs of worsening condition were discussed and understood by the patient. Based on patient's age, coexisting illness, exam, and the results of this ED evaluation, the decision to treat as an outpatient was made. Based on the information available at time of discharge, acute pathology requiring immediate intervention was deemed relative unlikely. While it is impossible to completely exclude the possibility of underlying serious disease or worsening of condition, I feel the relative likelihood is extremely low. I discussed this uncertainty with the patient, who understood ED evaluation and treatment and felt comfortable with the outpatient treatment plan. All questions regarding care, test results, and follow up were answered. The patient is stable and appropriate to discharge. They understand that they should return to the emergency department for any new or worsening symptoms. I stressed the importance of follow up for repeat assessment and possibly further evaluation/treatment. Dictation disclaimer:  Please note that this dictation was completed with [employer], the computer voice recognition software. Quite often unanticipated grammatical, syntax, homophones, and other interpretive errors are inadvertently transcribed by the computer software. Please disregard these errors. Please excuse any errors that have escaped final proofreading. Coding Diagnoses     Clinical Impression:   1. Chest pain, unspecified type        Disposition     Disposition: Discharge. HEMA Mercado Board Certified Emergency Physician    Provider Attestation:  If a scribe was utilized in generation of this patient record, I personally performed the services described in the documentation, reviewed the documentation, as recorded by the scribe in my presence, and it accurately records the patient's history of presenting illness, review of systems, patient physical examination, and procedures performed by me as the attending physician. Dixie Rivas M.D. DAVON Board Certified Emergency Physician  7/26/2020. Shobha Bach

## 2020-07-28 ENCOUNTER — APPOINTMENT (OUTPATIENT)
Dept: GENERAL RADIOLOGY | Age: 55
End: 2020-07-28
Attending: PHYSICIAN ASSISTANT
Payer: MEDICAID

## 2020-07-28 LAB
ALBUMIN SERPL-MCNC: 3.9 G/DL (ref 3.4–5)
ALBUMIN/GLOB SERPL: 1.1 {RATIO} (ref 0.8–1.7)
ALP SERPL-CCNC: 82 U/L (ref 45–117)
ALT SERPL-CCNC: 16 U/L (ref 13–56)
ANION GAP SERPL CALC-SCNC: 4 MMOL/L (ref 3–18)
AST SERPL-CCNC: 9 U/L (ref 10–38)
ATRIAL RATE: 70 BPM
BASOPHILS # BLD: 0 K/UL (ref 0–0.1)
BASOPHILS NFR BLD: 0 % (ref 0–2)
BILIRUB SERPL-MCNC: 0.4 MG/DL (ref 0.2–1)
BUN SERPL-MCNC: 15 MG/DL (ref 7–18)
BUN/CREAT SERPL: 12 (ref 12–20)
CALCIUM SERPL-MCNC: 9 MG/DL (ref 8.5–10.1)
CALCULATED P AXIS, ECG09: 77 DEGREES
CALCULATED R AXIS, ECG10: -20 DEGREES
CALCULATED T AXIS, ECG11: 107 DEGREES
CHLORIDE SERPL-SCNC: 110 MMOL/L (ref 100–111)
CK MB CFR SERPL CALC: 2.2 % (ref 0–4)
CK MB SERPL-MCNC: 1.4 NG/ML (ref 5–25)
CK SERPL-CCNC: 63 U/L (ref 26–192)
CO2 SERPL-SCNC: 30 MMOL/L (ref 21–32)
CREAT SERPL-MCNC: 1.3 MG/DL (ref 0.6–1.3)
DIAGNOSIS, 93000: NORMAL
DIFFERENTIAL METHOD BLD: NORMAL
EOSINOPHIL # BLD: 0.2 K/UL (ref 0–0.4)
EOSINOPHIL NFR BLD: 2 % (ref 0–5)
ERYTHROCYTE [DISTWIDTH] IN BLOOD BY AUTOMATED COUNT: 13.3 % (ref 11.6–14.5)
GLOBULIN SER CALC-MCNC: 3.5 G/DL (ref 2–4)
GLUCOSE SERPL-MCNC: 91 MG/DL (ref 74–99)
HCT VFR BLD AUTO: 41.4 % (ref 35–45)
HGB BLD-MCNC: 13.4 G/DL (ref 12–16)
LYMPHOCYTES # BLD: 2.5 K/UL (ref 0.9–3.6)
LYMPHOCYTES NFR BLD: 30 % (ref 21–52)
MCH RBC QN AUTO: 29 PG (ref 24–34)
MCHC RBC AUTO-ENTMCNC: 32.4 G/DL (ref 31–37)
MCV RBC AUTO: 89.6 FL (ref 74–97)
MONOCYTES # BLD: 0.6 K/UL (ref 0.05–1.2)
MONOCYTES NFR BLD: 8 % (ref 3–10)
NEUTS SEG # BLD: 5.2 K/UL (ref 1.8–8)
NEUTS SEG NFR BLD: 60 % (ref 40–73)
P-R INTERVAL, ECG05: 146 MS
PLATELET # BLD AUTO: 228 K/UL (ref 135–420)
PMV BLD AUTO: 10.8 FL (ref 9.2–11.8)
POTASSIUM SERPL-SCNC: 4.2 MMOL/L (ref 3.5–5.5)
PROT SERPL-MCNC: 7.4 G/DL (ref 6.4–8.2)
Q-T INTERVAL, ECG07: 440 MS
QRS DURATION, ECG06: 130 MS
QTC CALCULATION (BEZET), ECG08: 475 MS
RBC # BLD AUTO: 4.62 M/UL (ref 4.2–5.3)
SODIUM SERPL-SCNC: 144 MMOL/L (ref 136–145)
TROPONIN I SERPL-MCNC: <0.02 NG/ML (ref 0–0.04)
VENTRICULAR RATE, ECG03: 70 BPM
WBC # BLD AUTO: 8.5 K/UL (ref 4.6–13.2)

## 2020-07-28 PROCEDURE — 74011250637 HC RX REV CODE- 250/637: Performed by: PHYSICIAN ASSISTANT

## 2020-07-28 PROCEDURE — 74011000250 HC RX REV CODE- 250: Performed by: PHYSICIAN ASSISTANT

## 2020-07-28 PROCEDURE — 82550 ASSAY OF CK (CPK): CPT

## 2020-07-28 PROCEDURE — 71045 X-RAY EXAM CHEST 1 VIEW: CPT

## 2020-07-28 PROCEDURE — 74011636637 HC RX REV CODE- 636/637: Performed by: PHYSICIAN ASSISTANT

## 2020-07-28 PROCEDURE — 80053 COMPREHEN METABOLIC PANEL: CPT

## 2020-07-28 PROCEDURE — 93005 ELECTROCARDIOGRAM TRACING: CPT

## 2020-07-28 PROCEDURE — 74011000250 HC RX REV CODE- 250: Performed by: EMERGENCY MEDICINE

## 2020-07-28 PROCEDURE — 85025 COMPLETE CBC W/AUTO DIFF WBC: CPT

## 2020-07-28 RX ORDER — PREDNISONE 20 MG/1
60 TABLET ORAL
Status: COMPLETED | OUTPATIENT
Start: 2020-07-28 | End: 2020-07-28

## 2020-07-28 RX ORDER — IPRATROPIUM BROMIDE AND ALBUTEROL SULFATE 2.5; .5 MG/3ML; MG/3ML
3 SOLUTION RESPIRATORY (INHALATION)
Qty: 30 NEBULE | Refills: 0 | Status: SHIPPED | OUTPATIENT
Start: 2020-07-28 | End: 2020-09-06

## 2020-07-28 RX ORDER — IPRATROPIUM BROMIDE AND ALBUTEROL SULFATE 2.5; .5 MG/3ML; MG/3ML
3 SOLUTION RESPIRATORY (INHALATION)
Status: COMPLETED | OUTPATIENT
Start: 2020-07-28 | End: 2020-07-28

## 2020-07-28 RX ORDER — OXYCODONE AND ACETAMINOPHEN 5; 325 MG/1; MG/1
1 TABLET ORAL
Status: COMPLETED | OUTPATIENT
Start: 2020-07-28 | End: 2020-07-28

## 2020-07-28 RX ADMIN — PREDNISONE 60 MG: 20 TABLET ORAL at 03:43

## 2020-07-28 RX ADMIN — OXYCODONE HYDROCHLORIDE AND ACETAMINOPHEN 1 TABLET: 5; 325 TABLET ORAL at 03:43

## 2020-07-28 RX ADMIN — IPRATROPIUM BROMIDE AND ALBUTEROL SULFATE 3 ML: .5; 3 SOLUTION RESPIRATORY (INHALATION) at 03:43

## 2020-07-28 RX ADMIN — IPRATROPIUM BROMIDE AND ALBUTEROL SULFATE 3 ML: .5; 3 SOLUTION RESPIRATORY (INHALATION) at 03:12

## 2020-07-28 NOTE — ED PROVIDER NOTES
EMERGENCY DEPARTMENT HISTORY AND PHYSICAL EXAM    Date: 7/27/2020  Patient Name: Wyatt Molina    History of Presenting Illness     Chief Complaint   Patient presents with    Shortness of Breath         History Provided By patient    Chief Complaint: SOB  Duration: several days  Timing:  Acute on chronic  Location: chest  Quality:tightness  Severity: moderate to severe  Modifying Factors: home meds have not alleviated the sx  Associated Symptoms: chest tightness and wheezing       Additional History (Context): Wyatt Molina is a 63 y.o. female with PMH asthma, CHF, COPD, hypertension, hypercholesterolemia who presents with complaints of continued wheezing, chest tightness, shortness of breath the past several days. History of for the same complaints. She had a complete cardiac work-up as well as a CTA chest.  Patient's work-up yesterday was negative. Her troponin was also trended in the ED. CTA chest showed no evidence of pulmonary embolism or infiltrates. Patient states she currently wears 3 L of O2 by nasal cannula at all times. She is present in the ED without oxygen. No other complaints reported at this time. PCP: Tracy Hernandez NP    Current Facility-Administered Medications   Medication Dose Route Frequency Provider Last Rate Last Dose    oxyCODONE-acetaminophen (PERCOCET) 5-325 mg per tablet 1 Tab  1 Tab Oral NOW Vangie Edwards PA-C        predniSONE (DELTASONE) tablet 60 mg  60 mg Oral NOW Vangie Edwards [place]        albuterol-ipratropium (DUO-NEB) 2.5 MG-0.5 MG/3 ML  3 mL Nebulization NOW Vangie Edwards PA-C        albuterol-ipratropium (DUO-NEB) 2.5 MG-0.5 MG/3 ML  3 mL Nebulization NOW Reyes Delude, MD         Current Outpatient Medications   Medication Sig Dispense Refill    oxyCODONE-acetaminophen (Percocet) 5-325 mg per tablet Take 1 Tab by mouth every six (6) hours as needed for Pain for up to 3 days. Max Daily Amount: 4 Tabs.  4 Tab 0    furosemide (Lasix) 20 mg tablet Take 1 Tab by mouth daily. 5 Tab 0    albuterol sulfate 90 mcg/actuation aebs Take 2 Puffs by inhalation every four to six (4-6) hours as needed for Wheezing. 1 Inhaler 0    albuterol-ipratropium (DUO-NEB) 2.5 mg-0.5 mg/3 ml nebu 3 mL by Nebulization route every four (4) hours as needed for Wheezing. 30 Nebule 0    hydrOXYzine pamoate (VistariL) 50 mg capsule Take 1 Cap by mouth three (3) times daily as needed for Anxiety for up to 10 doses. 10 Cap 0    cloNIDine HCL (CATAPRES) 0.2 mg tablet Take 1 Tab by mouth two (2) times a day. 30 Tab 1    amLODIPine (NORVASC) 10 mg tablet Take 1 Tab by mouth daily. 15 Tab 0    ALPRAZolam (Xanax) 0.5 mg tablet Take 1 Tab by mouth every eight (8) hours as needed for Anxiety. Max Daily Amount: 1.5 mg. 20 Tab 0    aspirin delayed-release 81 mg tablet Take 1 Tab by mouth daily. 30 Tab 0    ondansetron (ZOFRAN ODT) 4 mg disintegrating tablet Take 1 Tab by mouth every eight (8) hours as needed for Nausea. Indications: prevent nausea and vomiting after surgery 20 Tab 0    famotidine (PEPCID) 20 mg tablet Take 1 Tab by mouth daily. 30 Tab 1    OXYGEN-AIR DELIVERY SYSTEMS 3 L by IntraNASal route as needed for Other (sob).  docusate sodium (COLACE) 100 mg capsule Take 1 Cap by mouth two (2) times a day. 120 Cap 0    fluticasone furoate-vilanterol (BREO ELLIPTA) 200-25 mcg/dose inhaler Take 1 Puff by inhalation daily.  arformoterol (BROVANA) 15 mcg/2 mL nebu neb solution 2 mL by Nebulization route two (2) times a day. 60 Vial 0    budesonide (PULMICORT) 0.5 mg/2 mL nbsp 2 mL by Nebulization route two (2) times a day. 60 Each 0    tiotropium (SPIRIVA) 18 mcg inhalation capsule Take 1 Cap by inhalation daily. 30 Cap 0    atorvastatin (LIPITOR) 20 mg tablet Take 1 Tab by mouth nightly. 30 Tab 0    escitalopram oxalate (LEXAPRO) 10 mg tablet Take 1 Tab by mouth every evening.  30 Tab 0    metoprolol tartrate (LOPRESSOR) 25 mg tablet Take 0.5 Tabs by mouth every twelve (12) hours. 30 Tab 0    roflumilast (DALIRESP) 500 mcg tab tablet Take 1 Tab by mouth daily. 30 Tab 0    OTHER Incentive spirometry- use as directed 1 Each 0    naloxone (NARCAN) 4 mg/actuation nasal spray Use 1 spray intranasally, then discard. Repeat with new spray every 2 min as needed for opioid overdose symptoms, alternating nostrils. 1 Each 0    montelukast (SINGULAIR) 10 mg tablet Take 1 Tab by mouth nightly. 30 Tab 1       Past History     Past Medical History:  Past Medical History:   Diagnosis Date    Asthma     CHF (congestive heart failure) (HCC)     Chronic obstructive pulmonary disease (HCC)     Dependence on supplemental oxygen     Endocrine disease     thyroid issues    Gastrointestinal disorder     \"blockage in my stomach\"    Hypercholesterolemia     Hypertension        Past Surgical History:  Past Surgical History:   Procedure Laterality Date    HX GI      Exploratory laparotomy with lysis of adhesions and primary repair of incarcerated umbilical hernia       Family History:  History reviewed. No pertinent family history. Social History:  Social History     Tobacco Use    Smoking status: Current Every Day Smoker     Packs/day: 0.25    Smokeless tobacco: Current User   Substance Use Topics    Alcohol use: No     Comment: socially    Drug use: Not Currently     Types: Heroin       Allergies:  No Known Allergies      Review of Systems   Review of Systems   Constitutional: Negative. Negative for chills and fever. HENT: Negative. Negative for congestion, ear pain and rhinorrhea. Eyes: Negative. Negative for pain and redness. Respiratory: Positive for chest tightness, shortness of breath and wheezing. Negative for stridor. Cardiovascular: Negative. Negative for chest pain and leg swelling. Gastrointestinal: Negative. Negative for abdominal pain, constipation, diarrhea, nausea and vomiting. Genitourinary: Negative. Negative for dysuria and frequency. Musculoskeletal: Negative. Negative for back pain and neck pain. Skin: Negative. Negative for rash and wound. Neurological: Negative. Negative for dizziness, seizures, syncope and headaches. All other systems reviewed and are negative. All Other Systems Negative  Physical Exam     Vitals:    07/27/20 2257   BP: (!) 204/97   Resp: 22   Temp: 98.8 °F (37.1 °C)   SpO2: 98%     Physical Exam  Vitals signs and nursing note reviewed. Constitutional:       General: She is not in acute distress. Appearance: She is well-developed. She is not diaphoretic. HENT:      Head: Normocephalic and atraumatic. Eyes:      General: No scleral icterus. Right eye: No discharge. Left eye: No discharge. Conjunctiva/sclera: Conjunctivae normal.   Neck:      Musculoskeletal: Normal range of motion and neck supple. Cardiovascular:      Rate and Rhythm: Normal rate and regular rhythm. Heart sounds: Normal heart sounds. No murmur. No friction rub. No gallop. Pulmonary:      Effort: No respiratory distress. Breath sounds: No stridor. Wheezing present. No rhonchi or rales. Comments: Diminished breath sounds bilaterally with few scattered expiratory wheezes noted. Patient's breathing is slightly tachypneic. Musculoskeletal: Normal range of motion. Skin:     General: Skin is warm and dry. Findings: No erythema or rash. Neurological:      Mental Status: She is alert and oriented to person, place, and time. Coordination: Coordination normal.      Comments: Gait is steady and patient exhibits no evidence of ataxia. Patient is able to ambulate without difficulty. No focal neurological deficit noted. No facial droop, slurred speech, or evidence of altered mentation noted on exam.      Psychiatric:         Behavior: Behavior normal.         Thought Content:  Thought content normal.              Diagnostic Study Results     Labs -   No results found for this or any previous visit (from the past 12 hour(s)). Radiologic Studies -   XR CHEST PORT    (Results Pending)   no acute process or definite infiltrates noted  CT Results  (Last 48 hours)               07/26/20 1306  CTA CHEST W OR W WO CONT Final result    Impression:  IMPRESSION:   1. No convincing evidence for pulmonary embolus. 2. Perhaps trace diffuse bronchial wall thickening. 3. Mild cardiomegaly. 4. Enlarged heterogeneous/multinodular thyroid gland, perhaps slightly more   prominent when compared to 2017 exam. Similar mild narrowing of the trachea. Nonemergent thyroid ultrasound may be helpful for further evaluation. Narrative:  CTA CHEST PULMONARY EMBOLISM PROTOCOL        INDICATION: Dyspnea. Chest pain, pleurisy. Question pulmonary embolism. COMPARISON: Correlation with CT abdomen/pelvis 6/14/2020, CT chest 12/19/2017       TECHNIQUE:  With IV administration of 100 ml Isovue-370, axial CT images through   the thorax were obtained using helical technique following a pulmonary embolism   protocol. In order more optimally to evaluate the pulmonary arterial tree in   multiplanar CT angiographic fashion, coronal and sagittal reformation maximum   intensity projection (MIP) images were also performed. Subsequently, serial   axial CT images were obtained of the abdomen and pelvis. All CT scans at this   facility are performed using dose optimization technique as appropriate to a   performed exam, to include automated exposure control, adjustment of the mA   and/or kV according to patient's size (including appropriate matching for   site-specific examinations), or use of iterative reconstruction technique.        CHEST FINDINGS:   -Pulmonary Arteries: No filling defects are appreciated within the main, left,   right, lobar or visualized segmental pulmonary arteries to suggest embolism.         -Mediastinum: Enlarged heterogeneous/multinodular thyroid gland causes mild   narrowing of the trachea, right lobe of thyroid more prominent than the left no   evidence for thoracic aortic aneurysm or dissection. No CT evidence for right   heart strain. Mild cardiomegaly. No pericardial effusion.         -Lymph Nodes: No mediastinal, axillary, or hilar adenopathy       -Lungs: Perhaps trace bronchial wall thickening. Bandlike atelectasis at the   left lower lobe. No focal consolidation, pleural effusion, or pneumothorax. -Upper abdomen: Limited evaluation due to streak artifact from patient's right   upper extremity. Hepatic and left renal cysts are again noted. -Bones: No acute osseous abnormalities are identified. CXR Results  (Last 48 hours)               07/26/20 0921  XR CHEST PA LAT Final result    Impression:  IMPRESSION:       1. No acute finding. 2. Hyperinflation, possibly indicative of COPD. Meka Marie Narrative:  Chest  PA and lateral views       INDICATION:  Dyspnea. Possible Covid 19       COMPARISON:  Prior chest x-rays, most recent 7/20/2020       FINDINGS:  The cardiac silhouette is normal in size. Atherosclerosis noted. The   pulmonary vasculature is unremarkable. No focal consolidation, pleural   effusion, or pneumothorax. Lungs are hyperinflated. No acute osseous   abnormalities are identified. Chronic wedging of a few mid level thoracic   vertebral bodies. Medical Decision Making   I am the first provider for this patient. I reviewed the vital signs, available nursing notes, past medical history, past surgical history, family history and social history. Vital Signs-Reviewed the patient's vital signs. Records Reviewed: Vangie Edwards PA-C     Procedures:  Procedures    Provider Notes (Medical Decision Making): Impression:  COPD exacerbation    Duo neb and prednisone given in the ED  Portable chest x-ray, EKG, and labs ordered. Chest x-ray: negative for acute process or definite infiltrates.        2:34 AM pt has received her duo neb tx and was placed on 3 L by nasal canula. Her labs and EKG are still pending. Vangie Edwards PA-C     3:34 AM pt c/o wheezing and tightness again, requesting second duo neb tx. This has been ordered. Vangie Edwards PA-C     4:00 AM Pt is turned over to Dr. Jose Reynolds who agrees to assume care of this pt at this time. Discussed this case with the doctor who agrees with the plan to dispo the pt pending results of labs/imaging and re-evaluation at bedside. Vangie Edwards PA-C     MED RECONCILIATION:  Current Facility-Administered Medications   Medication Dose Route Frequency    oxyCODONE-acetaminophen (PERCOCET) 5-325 mg per tablet 1 Tab  1 Tab Oral NOW    predniSONE (DELTASONE) tablet 60 mg  60 mg Oral NOW    albuterol-ipratropium (DUO-NEB) 2.5 MG-0.5 MG/3 ML  3 mL Nebulization NOW    albuterol-ipratropium (DUO-NEB) 2.5 MG-0.5 MG/3 ML  3 mL Nebulization NOW     Current Outpatient Medications   Medication Sig    oxyCODONE-acetaminophen (Percocet) 5-325 mg per tablet Take 1 Tab by mouth every six (6) hours as needed for Pain for up to 3 days. Max Daily Amount: 4 Tabs.  furosemide (Lasix) 20 mg tablet Take 1 Tab by mouth daily.  albuterol sulfate 90 mcg/actuation aebs Take 2 Puffs by inhalation every four to six (4-6) hours as needed for Wheezing.  albuterol-ipratropium (DUO-NEB) 2.5 mg-0.5 mg/3 ml nebu 3 mL by Nebulization route every four (4) hours as needed for Wheezing.  hydrOXYzine pamoate (VistariL) 50 mg capsule Take 1 Cap by mouth three (3) times daily as needed for Anxiety for up to 10 doses.  cloNIDine HCL (CATAPRES) 0.2 mg tablet Take 1 Tab by mouth two (2) times a day.  amLODIPine (NORVASC) 10 mg tablet Take 1 Tab by mouth daily.  ALPRAZolam (Xanax) 0.5 mg tablet Take 1 Tab by mouth every eight (8) hours as needed for Anxiety. Max Daily Amount: 1.5 mg.    aspirin delayed-release 81 mg tablet Take 1 Tab by mouth daily.     ondansetron (ZOFRAN ODT) 4 mg disintegrating tablet Take 1 Tab by mouth every eight (8) hours as needed for Nausea. Indications: prevent nausea and vomiting after surgery    famotidine (PEPCID) 20 mg tablet Take 1 Tab by mouth daily.  OXYGEN-AIR DELIVERY SYSTEMS 3 L by IntraNASal route as needed for Other (sob).  docusate sodium (COLACE) 100 mg capsule Take 1 Cap by mouth two (2) times a day.  fluticasone furoate-vilanterol (BREO ELLIPTA) 200-25 mcg/dose inhaler Take 1 Puff by inhalation daily.  arformoterol (BROVANA) 15 mcg/2 mL nebu neb solution 2 mL by Nebulization route two (2) times a day.  budesonide (PULMICORT) 0.5 mg/2 mL nbsp 2 mL by Nebulization route two (2) times a day.  tiotropium (SPIRIVA) 18 mcg inhalation capsule Take 1 Cap by inhalation daily.  atorvastatin (LIPITOR) 20 mg tablet Take 1 Tab by mouth nightly.  escitalopram oxalate (LEXAPRO) 10 mg tablet Take 1 Tab by mouth every evening.  metoprolol tartrate (LOPRESSOR) 25 mg tablet Take 0.5 Tabs by mouth every twelve (12) hours.  roflumilast (DALIRESP) 500 mcg tab tablet Take 1 Tab by mouth daily.  OTHER Incentive spirometry- use as directed    naloxone (NARCAN) 4 mg/actuation nasal spray Use 1 spray intranasally, then discard. Repeat with new spray every 2 min as needed for opioid overdose symptoms, alternating nostrils.  montelukast (SINGULAIR) 10 mg tablet Take 1 Tab by mouth nightly. Disposition:  TBD    DISCHARGE NOTE:       Follow-up Information     Follow up With Specialties Details Why Contact Info    Hugo Reyes NP Nurse Practitioner Schedule an appointment as soon as possible for a visit in 2 days  [address] [facility] [address] [phone]      [facility] EMERGENCY DEPT Emergency Medicine  As needed, If symptoms worsen [address] [id number] [phone]          Current Discharge Medication List              Diagnosis     Clinical Impression:   1.  COPD exacerbation ([facility] [address]

## 2020-07-28 NOTE — DISCHARGE INSTRUCTIONS
Patient Education        Chronic Obstructive Pulmonary Disease (COPD): Care Instructions  Your Care Instructions     Chronic obstructive pulmonary disease (COPD) is a general term for a group of lung diseases, including emphysema and chronic bronchitis. People with COPD have decreased airflow in and out of the lungs, which makes it hard to breathe. The airways also can get clogged with thick mucus. Cigarette smoking is a major cause of COPD. Although there is no cure for COPD, you can slow its progress. Following your treatment plan and taking care of yourself can help you feel better and live longer. Follow-up care is a key part of your treatment and safety. Be sure to make and go to all appointments, and call your doctor if you are having problems. It's also a good idea to know your test results and keep a list of the medicines you take. How can you care for yourself at home? Staying healthy  · Do not smoke. This is the most important step you can take to prevent more damage to your lungs. If you need help quitting, talk to your doctor about stop-smoking programs and medicines. These can increase your chances of quitting for good. · Avoid colds and flu. Get a pneumococcal vaccine shot. If you have had one before, ask your doctor whether you need a second dose. Get the flu vaccine every fall. If you must be around people with colds or the flu, wash your hands often. · Avoid secondhand smoke, air pollution, and high altitudes. Also avoid cold, dry air and hot, humid air. Stay at home with your windows closed when air pollution is bad. Medicines and oxygen therapy  · Take your medicines exactly as prescribed. Call your doctor if you think you are having a problem with your medicine. You may be taking medicines such as:  ? Bronchodilators. These help open your airways and make breathing easier. They are either short-acting (work for 6 to 9 hours) or long-acting (work for 24 hours).  You inhale most bronchodilators, so they start to act quickly. Always carry your quick-relief inhaler with you in case you need it while you are away from home. ? Corticosteroids (prednisone, budesonide). These reduce airway inflammation. They come in pill or inhaled form. You must take these medicines every day for them to work well. · Ask your doctor or pharmacist if a spacer is right for you. A spacer may help you get more inhaled medicine to your lungs. If you use one, ask how to use it properly. · Do not take any vitamins, over-the-counter medicine, or herbal products without talking to your doctor first.  · If your doctor prescribed antibiotics, take them as directed. Do not stop taking them just because you feel better. You need to take the full course of antibiotics. · If you use oxygen therapy, use the flow rate your doctor has recommended. Don't change it without talking to your doctor first. Oxygen therapy boosts the amount of oxygen in your blood and helps you breathe easier. Activity  · Get regular exercise. Walking is an easy way to get exercise. Start out slowly, and walk a little more each day. · Pay attention to your breathing. You are exercising too hard if you can't talk while you exercise. · Take short rest breaks when doing household chores and other activities. · Learn breathing methods--such as breathing through pursed lips--to help you become less short of breath. · If your doctor has not set you up with a pulmonary rehabilitation program, ask if rehab is right for you. Rehab includes exercise programs, education about your disease and how to manage it, help with diet and other changes, and emotional support. Diet  · Eat regular, healthy meals. Use bronchodilators about 1 hour before you eat to make it easier to eat. Eat several small meals instead of three large ones. Drink beverages at the end of the meal. Avoid foods that are hard to chew.   · Eat foods that contain protein so you don't lose muscle mass. · Talk with your doctor if you gain too much weight or if you lose weight without trying. Mental health  · Talk to your family, friends, or a therapist about your feelings. Some people feel frightened, angry, hopeless, helpless, and even guilty. Talking openly about bad feelings can help you cope. If these feelings last, talk to your doctor. When should you call for help? [id number] anytime you think you may need emergency care. For example, call if:  · You have severe trouble breathing. Call your doctor now or seek immediate medical care if:  · You have new or worse trouble breathing. · You cough up blood. · You have a fever. Watch closely for changes in your health, and be sure to contact your doctor if:  · You cough more deeply or more often, especially if you notice more mucus or a change in the color of your mucus. · You have new or worse swelling in your legs or belly. · You are not getting better as expected. Where can you learn more? Go to http://magda-kadie.info/  Enter R770 in the search box to learn more about \"Chronic Obstructive Pulmonary Disease (COPD): Care Instructions. \"  Current as of: February 24, 2020               Content Version: 12.5  © 2442-2108 Healthwise, Incorporated. Care instructions adapted under license by [employer] (which disclaims liability or warranty for this information). If you have questions about a medical condition or this instruction, always ask your healthcare professional. [place] [address] any warranty or liability for your use of this information. [employer] Activation    Thank you for requesting access to [employer]. Please follow the instructions below to securely access and download your online medical record. [employer] allows you to send messages to your doctor, view your test results, renew your prescriptions, schedule appointments, and more. How Do I Sign Up? 1.  In your internet browser, go to [email or web address]  2. Click on the First Time User? Click Here link in the Sign In box. You will be redirect to the New Member Sign Up page. 3. Enter your [employer] Access Code exactly as it appears below. You will not need to use this code after youve completed the sign-up process. If you do not sign up before the expiration date, you must request a new code. [employer] Access Code: [email or web address]  Expires: 2020  1:15 AM (This is the date your [employer] access code will )    4. Enter the last four digits of your Social Security Number (xxxx) and Date of Birth (mm/dd/yyyy) as indicated and click Submit. You will be taken to the next sign-up page. 5. Create a [employer]t ID. This will be your [employer] login ID and cannot be changed, so think of one that is secure and easy to remember. 6. Create a [employer] password. You can change your password at any time. 7. Enter your Password Reset Question and Answer. This can be used at a later time if you forget your password. 8. Enter your e-mail address. You will receive e-mail notification when new information is available in [address] 9. Click Sign Up. You can now view and download portions of your medical record. 10. Click the Download Summary menu link to download a portable copy of your medical information. Additional Information    If you have questions, please visit the Frequently Asked Questions section of the [employer] website at [email or web address] Remember, [employer] is NOT to be used for urgent needs. For medical emergencies, dial 911.

## 2020-08-03 ENCOUNTER — APPOINTMENT (OUTPATIENT)
Dept: GENERAL RADIOLOGY | Age: 55
End: 2020-08-03
Attending: EMERGENCY MEDICINE
Payer: MEDICAID

## 2020-08-03 PROCEDURE — 93005 ELECTROCARDIOGRAM TRACING: CPT

## 2020-08-03 PROCEDURE — 71045 X-RAY EXAM CHEST 1 VIEW: CPT

## 2020-08-03 PROCEDURE — 99282 EMERGENCY DEPT VISIT SF MDM: CPT

## 2020-08-03 PROCEDURE — 96374 THER/PROPH/DIAG INJ IV PUSH: CPT

## 2020-08-04 ENCOUNTER — HOSPITAL ENCOUNTER (EMERGENCY)
Age: 55
Discharge: HOME | End: 2020-08-04
Attending: EMERGENCY MEDICINE
Payer: MEDICAID

## 2020-08-04 VITALS
RESPIRATION RATE: 22 BRPM | OXYGEN SATURATION: 100 % | SYSTOLIC BLOOD PRESSURE: 176 MMHG | DIASTOLIC BLOOD PRESSURE: 77 MMHG | TEMPERATURE: 97.7 F | HEART RATE: 59 BPM

## 2020-08-04 DIAGNOSIS — G89.29 CHRONIC CHEST PAIN: Primary | ICD-10-CM

## 2020-08-04 DIAGNOSIS — R07.9 CHRONIC CHEST PAIN: Primary | ICD-10-CM

## 2020-08-04 LAB
ANION GAP SERPL CALC-SCNC: 3 MMOL/L (ref 3–18)
BASOPHILS # BLD: 0 K/UL (ref 0–0.1)
BASOPHILS NFR BLD: 0 % (ref 0–2)
BUN SERPL-MCNC: 19 MG/DL (ref 7–18)
BUN/CREAT SERPL: 16 (ref 12–20)
CALCIUM SERPL-MCNC: 8.9 MG/DL (ref 8.5–10.1)
CHLORIDE SERPL-SCNC: 108 MMOL/L (ref 100–111)
CK MB CFR SERPL CALC: NORMAL % (ref 0–4)
CK MB SERPL-MCNC: <1 NG/ML (ref 5–25)
CK SERPL-CCNC: 52 U/L (ref 26–192)
CO2 SERPL-SCNC: 33 MMOL/L (ref 21–32)
CREAT SERPL-MCNC: 1.18 MG/DL (ref 0.6–1.3)
DIFFERENTIAL METHOD BLD: NORMAL
EOSINOPHIL # BLD: 0.2 K/UL (ref 0–0.4)
EOSINOPHIL NFR BLD: 3 % (ref 0–5)
ERYTHROCYTE [DISTWIDTH] IN BLOOD BY AUTOMATED COUNT: 13.6 % (ref 11.6–14.5)
GLUCOSE SERPL-MCNC: 104 MG/DL (ref 74–99)
HCT VFR BLD AUTO: 41.1 % (ref 35–45)
HGB BLD-MCNC: 13.3 G/DL (ref 12–16)
LYMPHOCYTES # BLD: 2.3 K/UL (ref 0.9–3.6)
LYMPHOCYTES NFR BLD: 33 % (ref 21–52)
MCH RBC QN AUTO: 29.1 PG (ref 24–34)
MCHC RBC AUTO-ENTMCNC: 32.4 G/DL (ref 31–37)
MCV RBC AUTO: 89.9 FL (ref 74–97)
MONOCYTES # BLD: 0.4 K/UL (ref 0.05–1.2)
MONOCYTES NFR BLD: 6 % (ref 3–10)
NEUTS SEG # BLD: 4 K/UL (ref 1.8–8)
NEUTS SEG NFR BLD: 58 % (ref 40–73)
PLATELET # BLD AUTO: 220 K/UL (ref 135–420)
PMV BLD AUTO: 10.7 FL (ref 9.2–11.8)
POTASSIUM SERPL-SCNC: 4.1 MMOL/L (ref 3.5–5.5)
RBC # BLD AUTO: 4.57 M/UL (ref 4.2–5.3)
SODIUM SERPL-SCNC: 144 MMOL/L (ref 136–145)
TROPONIN I SERPL-MCNC: <0.02 NG/ML (ref 0–0.04)
WBC # BLD AUTO: 6.9 K/UL (ref 4.6–13.2)

## 2020-08-04 PROCEDURE — 85025 COMPLETE CBC W/AUTO DIFF WBC: CPT

## 2020-08-04 PROCEDURE — 82550 ASSAY OF CK (CPK): CPT

## 2020-08-04 PROCEDURE — 74011250636 HC RX REV CODE- 250/636: Performed by: EMERGENCY MEDICINE

## 2020-08-04 PROCEDURE — 80048 BASIC METABOLIC PNL TOTAL CA: CPT

## 2020-08-04 PROCEDURE — 74011250637 HC RX REV CODE- 250/637: Performed by: EMERGENCY MEDICINE

## 2020-08-04 RX ORDER — GUAIFENESIN 100 MG/5ML
324 LIQUID (ML) ORAL
Status: COMPLETED | OUTPATIENT
Start: 2020-08-04 | End: 2020-08-04

## 2020-08-04 RX ORDER — KETOROLAC TROMETHAMINE 15 MG/ML
15 INJECTION, SOLUTION INTRAMUSCULAR; INTRAVENOUS
Status: COMPLETED | OUTPATIENT
Start: 2020-08-04 | End: 2020-08-04

## 2020-08-04 RX ADMIN — KETOROLAC TROMETHAMINE 15 MG: 15 INJECTION, SOLUTION INTRAMUSCULAR; INTRAVENOUS at 03:19

## 2020-08-04 RX ADMIN — ASPIRIN 81 MG CHEWABLE TABLET 324 MG: 81 TABLET CHEWABLE at 03:18

## 2020-08-04 NOTE — DISCHARGE INSTRUCTIONS
Patient Education        Chest Pain: Care Instructions  Your Care Instructions     There are many things that can cause chest pain. Some are not serious and will get better on their own in a few days. But some kinds of chest pain need more testing and treatment. Your doctor may have recommended a follow-up visit in the next 8 to 12 hours. If you are not getting better, you may need more tests or treatment. Even though your doctor has released you, you still need to watch for any problems. The doctor carefully checked you, but sometimes problems can develop later. If you have new symptoms or if your symptoms do not get better, get medical care right away. If you have worse or different chest pain or pressure that lasts more than 5 minutes or you passed out (lost consciousness), [id number] or seek other emergency help right away. A medical visit is only one step in your treatment. Even if you feel better, you still need to do what your doctor recommends, such as going to all suggested follow-up appointments and taking medicines exactly as directed. This will help you recover and help prevent future problems. How can you care for yourself at home? · Rest until you feel better. · Take your medicine exactly as prescribed. Call your doctor if you think you are having a problem with your medicine. · Do not drive after taking a prescription pain medicine. When should you call for help? [id number]:   · You passed out (lost consciousness). · You have severe difficulty breathing. · You have symptoms of a heart attack. These may include:  ? Chest pain or pressure, or a strange feeling in your chest.  ? Sweating. ? Shortness of breath. ? Nausea or vomiting. ? Pain, pressure, or a strange feeling in your back, neck, jaw, or upper belly or in one or both shoulders or arms. ? Lightheadedness or sudden weakness. ? A fast or irregular heartbeat.   After you call 911, the  may tell you to chew 1 adult-strength or 2 to 4 low-dose aspirin. Wait for an ambulance. Do not try to drive yourself. Call your doctor today if:   · You have any trouble breathing. · Your chest pain gets worse. · You are dizzy or lightheaded, or you feel like you may faint. · You are not getting better as expected. · You are having new or different chest pain. Where can you learn more? Go to http://magda-kadie.info/  Enter A120 in the search box to learn more about \"Chest Pain: Care Instructions. \"  Current as of: June 26, 2019               Content Version: 12.5  © 4803-9574 [employer]. Care instructions adapted under license by [employer] (which disclaims liability or warranty for this information). If you have questions about a medical condition or this instruction, always ask your healthcare professional. [address] any warranty or liability for your use of this information.

## 2020-08-04 NOTE — ED PROVIDER NOTES
Azam Patel is a 63 y.o. female with a past medical history of asthma, COPD on home O2, hypertension, CHF, hyperlipidemia, and chronic pain coming in with chest pain. Patient states that about 12 hours ago, around noon, she started having a pressure-like pain in the center of her chest.  She states is been constant since. She states she also gets a \"funny feeling\" in her left arm. She reports some associated dyspnea, but denies nausea or vomiting. Denies diaphoresis. Denies exertional symptoms. She denies lower extremity swelling or edema. She denies orthopnea. States that she has been using her albuterol MDI, without much improvement. States that she does continue to smoke despite her advanced emphysema. Did not take any medications prior to arrival.  Patient has no other complaints at this time. Past Medical History:   Diagnosis Date    Asthma     CHF (congestive heart failure) (HCC)     Chronic obstructive pulmonary disease (HCC)     Dependence on supplemental oxygen     Endocrine disease     thyroid issues    Gastrointestinal disorder     \"blockage in my stomach\"    Hypercholesterolemia     Hypertension        Past Surgical History:   Procedure Laterality Date    HX GI      Exploratory laparotomy with lysis of adhesions and primary repair of incarcerated umbilical hernia         No family history on file.     Social History     Socioeconomic History    Marital status: SINGLE     Spouse name: Not on file    Number of children: Not on file    Years of education: Not on file    Highest education level: Not on file   Occupational History    Not on file   Social Needs    Financial resource strain: Not on file    Food insecurity     Worry: Not on file     Inability: Not on file    Transportation needs     Medical: Not on file     Non-medical: Not on file   Tobacco Use    Smoking status: Current Every Day Smoker     Packs/day: 0.25    Smokeless tobacco: Current User Substance and Sexual Activity    Alcohol use: No     Comment: socially    Drug use: Not Currently     Types: Heroin    Sexual activity: Not Currently     Comment: last used 9 years ago   Lifestyle    Physical activity     Days per week: Not on file     Minutes per session: Not on file    Stress: Not on file   Relationships    Social connections     Talks on phone: Not on file     Gets together: Not on file     Attends Cheondoism service: Not on file     Active member of club or organization: Not on file     Attends meetings of clubs or organizations: Not on file     Relationship status: Not on file    Intimate partner violence     Fear of current or ex partner: Not on file     Emotionally abused: Not on file     Physically abused: Not on file     Forced sexual activity: Not on file   Other Topics Concern    Not on file   Social History Narrative    Not on file         ALLERGIES: Patient has no known allergies. Review of Systems   Constitutional: Negative. Negative for chills and fever. Respiratory: Positive for shortness of breath and wheezing. Negative for cough. Cardiovascular: Positive for chest pain. Negative for leg swelling. Gastrointestinal: Negative. Negative for abdominal pain, nausea and vomiting. Musculoskeletal: Negative. Negative for myalgias. Skin: Negative. Negative for rash. Neurological: Negative. Negative for dizziness, weakness and light-headedness. All other systems reviewed and are negative. Vitals:    08/04/20 0330   BP: 176/77   Pulse: (!) 59   Resp: 22   Temp: 97.7 °F (36.5 °C)   SpO2: 100%            Physical Exam  Vitals signs reviewed. Constitutional:       General: She is not in acute distress. Appearance: Normal appearance. She is well-developed. HENT:      Head: Normocephalic and atraumatic. Eyes:      Extraocular Movements: Extraocular movements intact.       Conjunctiva/sclera: Conjunctivae normal.      Pupils: Pupils are equal, round, and reactive to light. Neck:      Musculoskeletal: Normal range of motion and neck supple. Cardiovascular:      Rate and Rhythm: Normal rate and regular rhythm. Heart sounds: S1 normal and S2 normal. No murmur. No friction rub. No gallop. Pulmonary:      Effort: Pulmonary effort is normal. No accessory muscle usage or respiratory distress. Breath sounds: Wheezing present. Comments: Patient has some mild expiratory wheezing in all lung fields. She has no accessory abdominal muscle use or retractions. Speaking in full sentences in no distress. Abdominal:      General: There is no distension. Tenderness: There is no abdominal tenderness. Musculoskeletal: Normal range of motion. General: No tenderness. Skin:     General: Skin is warm. Findings: No rash. Neurological:      General: No focal deficit present. Mental Status: She is alert and oriented to person, place, and time. Psychiatric:         Speech: Speech normal.          MDM  Number of Diagnoses or Management Options  Chronic chest pain:   Diagnosis management comments: Christiano Carrera is a 63 y.o. female coming in complaint of chest pain. Patient comes in very frequently for similar symptoms. We will get work-up to rule out ACS. No tachycardia, tachypnea, or hypoxia to suggest pulmonary embolism. Patient does not appear to be in any acute distress. Low suspicion of aortic pathology. Patient's troponin negative. EKG unchanged from baseline. Patient has had greater than 12 hours of symptoms. No concern for ACS at this point. Patient presents frequently similar symptoms and have seen patient personally multiple times for this. I once again advised her to follow-up with her regular doctor in the next couple days and return if symptoms drastically change or worsen.          Procedures      Vitals:  Patient Vitals for the past 12 hrs:   Temp Pulse Resp BP SpO2   08/04/20 0330 97.7 °F (36.5 °C) (!) 59 22 176/77 100 %       Medications ordered:   Medications   ketorolac (TORADOL) injection 15 mg (15 mg IntraVENous Given 8/4/20 0319)   aspirin chewable tablet 324 mg (324 mg Oral Given 8/4/20 0318)         Lab findings:  Recent Results (from the past 12 hour(s))   EKG, 12 LEAD, INITIAL    Collection Time: 08/03/20 11:47 PM   Result Value Ref Range    Ventricular Rate 69 BPM    Atrial Rate 69 BPM    P-R Interval 154 ms    QRS Duration 136 ms    Q-T Interval 456 ms    QTC Calculation (Bezet) 488 ms    Calculated P Axis 66 degrees    Calculated R Axis -28 degrees    Calculated T Axis 63 degrees    Diagnosis       Sinus rhythm with occasional premature ventricular complexes  Left bundle branch block  Abnormal ECG  When compared with ECG of 28-JUL-2020 03:56,  premature ventricular complexes are now present     CBC WITH AUTOMATED DIFF    Collection Time: 08/04/20  3:25 AM   Result Value Ref Range    WBC 6.9 4.6 - 13.2 K/uL    RBC 4.57 4.20 - 5.30 M/uL    HGB 13.3 12.0 - 16.0 g/dL    HCT 41.1 35.0 - 45.0 %    MCV 89.9 74.0 - 97.0 FL    MCH 29.1 24.0 - 34.0 PG    MCHC 32.4 31.0 - 37.0 g/dL    RDW 13.6 11.6 - 14.5 %    PLATELET 367 867 - 129 K/uL    MPV 10.7 9.2 - 11.8 FL    NEUTROPHILS 58 40 - 73 %    LYMPHOCYTES 33 21 - 52 %    MONOCYTES 6 3 - 10 %    EOSINOPHILS 3 0 - 5 %    BASOPHILS 0 0 - 2 %    ABS. NEUTROPHILS 4.0 1.8 - 8.0 K/UL    ABS. LYMPHOCYTES 2.3 0.9 - 3.6 K/UL    ABS. MONOCYTES 0.4 0.05 - 1.2 K/UL    ABS. EOSINOPHILS 0.2 0.0 - 0.4 K/UL    ABS.  BASOPHILS 0.0 0.0 - 0.1 K/UL    DF AUTOMATED     METABOLIC PANEL, BASIC    Collection Time: 08/04/20  3:25 AM   Result Value Ref Range    Sodium 144 136 - 145 mmol/L    Potassium 4.1 3.5 - 5.5 mmol/L    Chloride 108 100 - 111 mmol/L    CO2 33 (H) 21 - 32 mmol/L    Anion gap 3 3.0 - 18 mmol/L    Glucose 104 (H) 74 - 99 mg/dL    BUN 19 (H) 7.0 - 18 MG/DL    Creatinine 1.18 0.6 - 1.3 MG/DL    BUN/Creatinine ratio 16 12 - 20      GFR est AA 57 (L) >60 ml/min/1.73m2    GFR est non-AA 47 (L) >60 ml/min/1.73m2    Calcium 8.9 8.5 - 10.1 MG/DL   CARDIAC PANEL,(CK, CKMB & TROPONIN)    Collection Time: 08/04/20  3:25 AM   Result Value Ref Range    CK - MB <1.0 <3.6 ng/ml    CK-MB Index  0.0 - 4.0 %     CALCULATION NOT PERFORMED WHEN RESULT IS BELOW LINEAR LIMIT    CK 52 26 - 192 U/L    Troponin-I, QT <0.02 0.0 - 0.045 NG/ML       EKG interpretation by ED Physician:  Sinus rhythm rate of 69 bpm.  Left bundle branch block pattern consistent with prior EKGs. X-Ray, CT or other radiology findings or impressions:  XR CHEST PORT    (Results Pending)   No acute process      Disposition:  Diagnosis:   1. Chronic chest pain        Disposition: Discharge    Follow-up Information     Follow up With Specialties Details Why Contact Info    Francesca Ellis NP Nurse Practitioner Schedule an appointment as soon as possible for a visit for office follow up [facility] [place] [facility] EMERGENCY DEPT Emergency Medicine  As needed, If symptoms worsen [address] [facility] [id number]  [phone]           Patient's Medications   Start Taking    No medications on file   Continue Taking    ALBUTEROL SULFATE 90 MCG/ACTUATION AEBS    Take 2 Puffs by inhalation every four to six (4-6) hours as needed for Wheezing. ALBUTEROL-IPRATROPIUM (DUO-NEB) 2.5 MG-0.5 MG/3 ML NEBU    3 mL by Nebulization route every four (4) hours as needed for Wheezing. ALPRAZOLAM (XANAX) 0.5 MG TABLET    Take 1 Tab by mouth every eight (8) hours as needed for Anxiety. Max Daily Amount: 1.5 mg.    AMLODIPINE (NORVASC) 10 MG TABLET    Take 1 Tab by mouth daily. ARFORMOTEROL (BROVANA) 15 MCG/2 ML NEBU NEB SOLUTION    2 mL by Nebulization route two (2) times a day. ASPIRIN DELAYED-RELEASE 81 MG TABLET    Take 1 Tab by mouth daily. ATORVASTATIN (LIPITOR) 20 MG TABLET    Take 1 Tab by mouth nightly.     BUDESONIDE (PULMICORT) 0.5 MG/2 ML NBSP    2 mL by Nebulization route two (2) times a day.    CLONIDINE HCL (CATAPRES) 0.2 MG TABLET    Take 1 Tab by mouth two (2) times a day. DOCUSATE SODIUM (COLACE) 100 MG CAPSULE    Take 1 Cap by mouth two (2) times a day. ESCITALOPRAM OXALATE (LEXAPRO) 10 MG TABLET    Take 1 Tab by mouth every evening. FAMOTIDINE (PEPCID) 20 MG TABLET    Take 1 Tab by mouth daily. FLUTICASONE FUROATE-VILANTEROL (BREO ELLIPTA) 200-25 MCG/DOSE INHALER    Take 1 Puff by inhalation daily. FUROSEMIDE (LASIX) 20 MG TABLET    Take 1 Tab by mouth daily. HYDROXYZINE PAMOATE (VISTARIL) 50 MG CAPSULE    Take 1 Cap by mouth three (3) times daily as needed for Anxiety for up to 10 doses. METOPROLOL TARTRATE (LOPRESSOR) 25 MG TABLET    Take 0.5 Tabs by mouth every twelve (12) hours. MONTELUKAST (SINGULAIR) 10 MG TABLET    Take 1 Tab by mouth nightly. NALOXONE (NARCAN) 4 MG/ACTUATION NASAL SPRAY    Use 1 spray intranasally, then discard. Repeat with new spray every 2 min as needed for opioid overdose symptoms, alternating nostrils. ONDANSETRON (ZOFRAN ODT) 4 MG DISINTEGRATING TABLET    Take 1 Tab by mouth every eight (8) hours as needed for Nausea. Indications: prevent nausea and vomiting after surgery    OTHER    Incentive spirometry- use as directed    OXYGEN-AIR DELIVERY SYSTEMS    3 L by IntraNASal route as needed for Other (sob). ROFLUMILAST (DALIRESP) 500 MCG TAB TABLET    Take 1 Tab by mouth daily. TIOTROPIUM (SPIRIVA) 18 MCG INHALATION CAPSULE    Take 1 Cap by inhalation daily.    These Medications have changed    No medications on file   Stop Taking    No medications on file

## 2020-08-05 LAB
ATRIAL RATE: 69 BPM
CALCULATED P AXIS, ECG09: 66 DEGREES
CALCULATED R AXIS, ECG10: -28 DEGREES
CALCULATED T AXIS, ECG11: 63 DEGREES
DIAGNOSIS, 93000: NORMAL
P-R INTERVAL, ECG05: 154 MS
Q-T INTERVAL, ECG07: 456 MS
QRS DURATION, ECG06: 136 MS
QTC CALCULATION (BEZET), ECG08: 488 MS
VENTRICULAR RATE, ECG03: 69 BPM

## 2020-08-06 ENCOUNTER — HOSPITAL ENCOUNTER (EMERGENCY)
Age: 55
Discharge: HOME | End: 2020-08-06
Attending: EMERGENCY MEDICINE
Payer: MEDICAID

## 2020-08-06 ENCOUNTER — APPOINTMENT (OUTPATIENT)
Dept: GENERAL RADIOLOGY | Age: 55
End: 2020-08-06
Attending: EMERGENCY MEDICINE
Payer: MEDICAID

## 2020-08-06 VITALS
HEIGHT: 59 IN | SYSTOLIC BLOOD PRESSURE: 161 MMHG | DIASTOLIC BLOOD PRESSURE: 80 MMHG | HEART RATE: 62 BPM | BODY MASS INDEX: 31.65 KG/M2 | WEIGHT: 157 LBS | OXYGEN SATURATION: 97 % | RESPIRATION RATE: 20 BRPM | TEMPERATURE: 98.1 F

## 2020-08-06 DIAGNOSIS — R07.9 CHEST PAIN, UNSPECIFIED TYPE: Primary | ICD-10-CM

## 2020-08-06 LAB
ALBUMIN SERPL-MCNC: 3.3 G/DL (ref 3.4–5)
ALBUMIN/GLOB SERPL: 1 {RATIO} (ref 0.8–1.7)
ALP SERPL-CCNC: 78 U/L (ref 45–117)
ALT SERPL-CCNC: 14 U/L (ref 13–56)
ANION GAP SERPL CALC-SCNC: 2 MMOL/L (ref 3–18)
AST SERPL-CCNC: 12 U/L (ref 10–38)
BASOPHILS # BLD: 0 K/UL (ref 0–0.1)
BASOPHILS NFR BLD: 0 % (ref 0–2)
BILIRUB SERPL-MCNC: 0.3 MG/DL (ref 0.2–1)
BUN SERPL-MCNC: 14 MG/DL (ref 7–18)
BUN/CREAT SERPL: 14 (ref 12–20)
CALCIUM SERPL-MCNC: 8.4 MG/DL (ref 8.5–10.1)
CHLORIDE SERPL-SCNC: 110 MMOL/L (ref 100–111)
CK MB CFR SERPL CALC: NORMAL % (ref 0–4)
CK MB SERPL-MCNC: <1 NG/ML (ref 5–25)
CK SERPL-CCNC: 60 U/L (ref 26–192)
CO2 SERPL-SCNC: 29 MMOL/L (ref 21–32)
CREAT SERPL-MCNC: 1.03 MG/DL (ref 0.6–1.3)
DIFFERENTIAL METHOD BLD: NORMAL
EOSINOPHIL # BLD: 0.2 K/UL (ref 0–0.4)
EOSINOPHIL NFR BLD: 3 % (ref 0–5)
ERYTHROCYTE [DISTWIDTH] IN BLOOD BY AUTOMATED COUNT: 13.4 % (ref 11.6–14.5)
GLOBULIN SER CALC-MCNC: 3.3 G/DL (ref 2–4)
GLUCOSE SERPL-MCNC: 84 MG/DL (ref 74–99)
HCT VFR BLD AUTO: 38.9 % (ref 35–45)
HGB BLD-MCNC: 12.3 G/DL (ref 12–16)
LYMPHOCYTES # BLD: 1.9 K/UL (ref 0.9–3.6)
LYMPHOCYTES NFR BLD: 29 % (ref 21–52)
MCH RBC QN AUTO: 28.2 PG (ref 24–34)
MCHC RBC AUTO-ENTMCNC: 31.6 G/DL (ref 31–37)
MCV RBC AUTO: 89.2 FL (ref 74–97)
MONOCYTES # BLD: 0.4 K/UL (ref 0.05–1.2)
MONOCYTES NFR BLD: 7 % (ref 3–10)
NEUTS SEG # BLD: 4 K/UL (ref 1.8–8)
NEUTS SEG NFR BLD: 61 % (ref 40–73)
PLATELET # BLD AUTO: 198 K/UL (ref 135–420)
PMV BLD AUTO: 10.9 FL (ref 9.2–11.8)
POTASSIUM SERPL-SCNC: 3.7 MMOL/L (ref 3.5–5.5)
PROT SERPL-MCNC: 6.6 G/DL (ref 6.4–8.2)
RBC # BLD AUTO: 4.36 M/UL (ref 4.2–5.3)
SODIUM SERPL-SCNC: 141 MMOL/L (ref 136–145)
TROPONIN I SERPL-MCNC: <0.02 NG/ML (ref 0–0.04)
WBC # BLD AUTO: 6.5 K/UL (ref 4.6–13.2)

## 2020-08-06 PROCEDURE — 85025 COMPLETE CBC W/AUTO DIFF WBC: CPT

## 2020-08-06 PROCEDURE — 93005 ELECTROCARDIOGRAM TRACING: CPT

## 2020-08-06 PROCEDURE — 74011250636 HC RX REV CODE- 250/636: Performed by: PHYSICIAN ASSISTANT

## 2020-08-06 PROCEDURE — 82550 ASSAY OF CK (CPK): CPT

## 2020-08-06 PROCEDURE — 80053 COMPREHEN METABOLIC PANEL: CPT

## 2020-08-06 PROCEDURE — 99285 EMERGENCY DEPT VISIT HI MDM: CPT

## 2020-08-06 PROCEDURE — 96374 THER/PROPH/DIAG INJ IV PUSH: CPT

## 2020-08-06 PROCEDURE — 71046 X-RAY EXAM CHEST 2 VIEWS: CPT

## 2020-08-06 RX ORDER — KETOROLAC TROMETHAMINE 15 MG/ML
15 INJECTION, SOLUTION INTRAMUSCULAR; INTRAVENOUS
Status: COMPLETED | OUTPATIENT
Start: 2020-08-06 | End: 2020-08-06

## 2020-08-06 RX ADMIN — KETOROLAC TROMETHAMINE 15 MG: 15 INJECTION, SOLUTION INTRAMUSCULAR; INTRAVENOUS at 18:50

## 2020-08-06 NOTE — ED TRIAGE NOTES
Pt brought in by EMS from home with chest pain since aprox 1pm. Pt has cardiology appointment next week. Per patient, Left sided chest pain radiating under left arm and left arm numbness since aprox 1pm today. Pt denies SOB at this time.

## 2020-08-06 NOTE — ED PROVIDER NOTES
5:52pm Attempted to see patient but was informed by Elaina Cavazos that when he called her name to perform labs she got up and left. Pt returned to be seen about 20 minutes later. [facility] EMERGENCY DEPT      Date: 8/6/2020  Patient Name: Tejal Gutierrez    History of Presenting Illness     Chief Complaint   Patient presents with    Chest Pain     63 y.o. female with a past medical history of Asthma, CHF, Hypertension, and Hypercholesterolemia presents to the ED c/o chest pain since 1pm today. She reports having a constant pressure in her chest for which she is requesting morphine and states every time she comes here for this she gets morphine. She does not recall any abnormal activity. Pt denies any shortness of breath, weakness, coughing, hemoptysis, abdominal pain, back pain, other symptoms. Patient denies any other associated signs or symptoms. Patient denies any other complaints. Nursing notes regarding the HPI and triage nursing notes were reviewed. Prior medical records were reviewed. Current Outpatient Medications   Medication Sig Dispense Refill    albuterol-ipratropium (DUO-NEB) 2.5 mg-0.5 mg/3 ml nebu 3 mL by Nebulization route every four (4) hours as needed for Wheezing. 30 Nebule 0    albuterol sulfate 90 mcg/actuation aebs Take 2 Puffs by inhalation every four to six (4-6) hours as needed for Wheezing. 1 Inhaler 0    furosemide (Lasix) 20 mg tablet Take 1 Tab by mouth daily. 5 Tab 0    hydrOXYzine pamoate (VistariL) 50 mg capsule Take 1 Cap by mouth three (3) times daily as needed for Anxiety for up to 10 doses. 10 Cap 0    cloNIDine HCL (CATAPRES) 0.2 mg tablet Take 1 Tab by mouth two (2) times a day. 30 Tab 1    amLODIPine (NORVASC) 10 mg tablet Take 1 Tab by mouth daily. 15 Tab 0    ALPRAZolam (Xanax) 0.5 mg tablet Take 1 Tab by mouth every eight (8) hours as needed for Anxiety.  Max Daily Amount: 1.5 mg. 20 Tab 0    aspirin delayed-release 81 mg tablet Take 1 Tab by mouth daily. 30 Tab 0    ondansetron (ZOFRAN ODT) 4 mg disintegrating tablet Take 1 Tab by mouth every eight (8) hours as needed for Nausea. Indications: prevent nausea and vomiting after surgery 20 Tab 0    famotidine (PEPCID) 20 mg tablet Take 1 Tab by mouth daily. 30 Tab 1    OXYGEN-AIR DELIVERY SYSTEMS 3 L by IntraNASal route as needed for Other (sob).  docusate sodium (COLACE) 100 mg capsule Take 1 Cap by mouth two (2) times a day. 120 Cap 0    fluticasone furoate-vilanterol (BREO ELLIPTA) 200-25 mcg/dose inhaler Take 1 Puff by inhalation daily.  arformoterol (BROVANA) 15 mcg/2 mL nebu neb solution 2 mL by Nebulization route two (2) times a day. 60 Vial 0    budesonide (PULMICORT) 0.5 mg/2 mL nbsp 2 mL by Nebulization route two (2) times a day. 60 Each 0    tiotropium (SPIRIVA) 18 mcg inhalation capsule Take 1 Cap by inhalation daily. 30 Cap 0    atorvastatin (LIPITOR) 20 mg tablet Take 1 Tab by mouth nightly. 30 Tab 0    escitalopram oxalate (LEXAPRO) 10 mg tablet Take 1 Tab by mouth every evening. 30 Tab 0    metoprolol tartrate (LOPRESSOR) 25 mg tablet Take 0.5 Tabs by mouth every twelve (12) hours. 30 Tab 0    roflumilast (DALIRESP) 500 mcg tab tablet Take 1 Tab by mouth daily. 30 Tab 0    OTHER Incentive spirometry- use as directed 1 Each 0    naloxone (NARCAN) 4 mg/actuation nasal spray Use 1 spray intranasally, then discard. Repeat with new spray every 2 min as needed for opioid overdose symptoms, alternating nostrils. 1 Each 0    montelukast (SINGULAIR) 10 mg tablet Take 1 Tab by mouth nightly.  30 Tab 1       Past History     Past Medical History:  Past Medical History:   Diagnosis Date    Asthma     CHF (congestive heart failure) (HCC)     Chronic obstructive pulmonary disease (HCC)     Dependence on supplemental oxygen     Endocrine disease     thyroid issues    Gastrointestinal disorder     \"blockage in my stomach\"    Hypercholesterolemia     Hypertension Past Surgical History:  Past Surgical History:   Procedure Laterality Date    HX GI      Exploratory laparotomy with lysis of adhesions and primary repair of incarcerated umbilical hernia       Family History:  History reviewed. No pertinent family history. Social History:  Social History     Tobacco Use    Smoking status: Current Every Day Smoker     Packs/day: 0.25    Smokeless tobacco: Current User   Substance Use Topics    Alcohol use: No     Comment: socially    Drug use: Not Currently     Types: Heroin       Allergies:  No Known Allergies    Patient's primary care provider (as noted in EPIC):  Nilam Bustamante NP    Review of Systems   Constitutional:  Denies malaise, fever, chills. Neck:  Denies injury or pain. Chest:  Denies injury. Cardiac:  + chest pain. Respiratory:  Denies cough, wheezing, difficulty breathing, shortness of breath. GI/ABD:  Denies injury, pain, distention, nausea, vomiting, diarrhea. :  Denies injury, pain, dysuria or urgency. Back:  Denies injury or pain. Pelvis:  Denies injury or pain. Extremity/MS:  Denies injury or pain. Neuro:  Denies dizziness, neurologic symptoms/deficits/paresthesias. Skin: Denies injury, rash, itching or skin changes. All other systems negative as reviewed. Visit Vitals  /80 (BP 1 Location: Left arm, BP Patient Position: At rest;Sitting)   Pulse 62   Temp 98.1 °F (36.7 °C)   Resp 20   Ht 4' 11\" (1.499 m)   Wt 71.2 kg (157 lb)   SpO2 97%   BMI 31.71 kg/m²       PHYSICAL EXAM:    CONSTITUTIONAL:  Alert, in no apparent distress;  well developed;  well nourished. HEAD:  Normocephalic, atraumatic. EYES:  EOMI. Non-icteric sclera. Normal conjunctiva. ENTM:  Mouth: mucous membranes moist.  NECK:  Supple  RESPIRATORY:  Chest clear, equal breath sounds, good air movement. CARDIOVASCULAR:  Regular rate and rhythm. No murmurs, rubs, or alba  Chest:  No rash, lesions, bruising.   No focal reproducible tenderness to palpation. GI:  Normal bowel sounds, abdomen soft and non-tender. No rebound or guarding. BACK:  Non-tender. UPPER EXT:  Normal inspection. LOWER EXT:  No edema. NEURO:  Moves all four extremities, and grossly normal motor exam.  SKIN:  No rashes;  Normal for age. PSYCH:  Alert and normal affect. DIFFERENTIAL DIAGNOSES/ MEDICAL DECISION MAKING:  Chest pain etiologies include acute cardiac events to include possible acute myocardial infarction, acute coronary syndrome, pneumonia, chest wall pain (myofascial/ musculoskeletal etiology), chronic obstructive pulmonary disease (copd), acute asthma exacerbation, acute bronchitis, pulmonary embolism, upper respiratory infection, referred abdominal pain, other etiologies, versus combination of the above. ED COURSE:    Xr Chest Pa Lat    Result Date: 8/6/2020  EXAMINATION: Chest 2 views INDICATION: Chest pain COMPARISON: 8/3/2020 FINDINGS: Frontal and lateral views of the chest obtained. Borderline prominent cardiac silhouette. Minimal aortic arch calcifications. No evidence of pneumothorax. No obvious acute osseous findings. IMPRESSION: Borderline prominent cardiac silhouette. No clearly acute findings.      Recent Results (from the past 12 hour(s))   CARDIAC PANEL,(CK, CKMB & TROPONIN)    Collection Time: 08/06/20  6:40 PM   Result Value Ref Range    CK - MB <1.0 <3.6 ng/ml    CK-MB Index  0.0 - 4.0 %     CALCULATION NOT PERFORMED WHEN RESULT IS BELOW LINEAR LIMIT    CK 60 26 - 192 U/L    Troponin-I, QT <0.02 0.0 - 0.045 NG/ML   CBC WITH AUTOMATED DIFF    Collection Time: 08/06/20  6:40 PM   Result Value Ref Range    WBC 6.5 4.6 - 13.2 K/uL    RBC 4.36 4.20 - 5.30 M/uL    HGB 12.3 12.0 - 16.0 g/dL    HCT 38.9 35.0 - 45.0 %    MCV 89.2 74.0 - 97.0 FL    MCH 28.2 24.0 - 34.0 PG    MCHC 31.6 31.0 - 37.0 g/dL    RDW 13.4 11.6 - 14.5 %    PLATELET 513 547 - 055 K/uL    MPV 10.9 9.2 - 11.8 FL    NEUTROPHILS 61 40 - 73 %    LYMPHOCYTES 29 21 - 52 %    MONOCYTES 7 3 - 10 %    EOSINOPHILS 3 0 - 5 %    BASOPHILS 0 0 - 2 %    ABS. NEUTROPHILS 4.0 1.8 - 8.0 K/UL    ABS. LYMPHOCYTES 1.9 0.9 - 3.6 K/UL    ABS. MONOCYTES 0.4 0.05 - 1.2 K/UL    ABS. EOSINOPHILS 0.2 0.0 - 0.4 K/UL    ABS. BASOPHILS 0.0 0.0 - 0.1 K/UL    DF AUTOMATED     METABOLIC PANEL, COMPREHENSIVE    Collection Time: 08/06/20  6:40 PM   Result Value Ref Range    Sodium 141 136 - 145 mmol/L    Potassium 3.7 3.5 - 5.5 mmol/L    Chloride 110 100 - 111 mmol/L    CO2 29 21 - 32 mmol/L    Anion gap 2 (L) 3.0 - 18 mmol/L    Glucose 84 74 - 99 mg/dL    BUN 14 7.0 - 18 MG/DL    Creatinine 1.03 0.6 - 1.3 MG/DL    BUN/Creatinine ratio 14 12 - 20      GFR est AA >60 >60 ml/min/1.73m2    GFR est non-AA 55 (L) >60 ml/min/1.73m2    Calcium 8.4 (L) 8.5 - 10.1 MG/DL    Bilirubin, total 0.3 0.2 - 1.0 MG/DL    ALT (SGPT) 14 13 - 56 U/L    AST (SGOT) 12 10 - 38 U/L    Alk. phosphatase 78 45 - 117 U/L    Protein, total 6.6 6.4 - 8.2 g/dL    Albumin 3.3 (L) 3.4 - 5.0 g/dL    Globulin 3.3 2.0 - 4.0 g/dL    A-G Ratio 1.0 0.8 - 1.7        ED COURSE:      EKG: Sinus kin at 55bpm; Twave inversion of v2 and v3; no STEMI. Repeat EKG showing same. Pt had onset of pain at 1 PM today, states it is been unchanged and constant since then. Given the time frame of the patients chest pain, an acute cardiac event could be ruled out with one set of normal cardiac enzymes. Patient had a cath in 03/26/2020 showing: Normal right dominant epicardial coronary circulation without significant stenosis. Normal LV function with EF 65%. No further coronary evaluation needed. Pt states she is wanting to go home at this time. I have discussed this patient with my attending and given timeframe of symptom onset, negative trop 5.5 hours after symptom onset, and negative cath, directed to discharge patient home with pcp and cardiology f/u. Pt given strict instructions to return for any worsening.      Patient was given Toradol here in the ED for her pain, noted complete resolution of her pain with the Toradol. She was completely asymptomatic prior to discharge. Diagnosis:   1. Chest pain, unspecified type      Disposition: Discharge    Follow-up Information     Follow up With Specialties Details Why Contact Info    Dellar Hammans, NP Nurse Practitioner In 3 days  [address] [facility]      Chiki Whitaker MD Cardiology In 3 days  [facility]  [address] [address] [facility]      [address] [facility] EMERGENCY DEPT Emergency Medicine  If symptoms worsen [address] [place] [id number]  [phone]          Discharge Medication List as of 8/6/2020  8:20 PM      CONTINUE these medications which have NOT CHANGED    Details   albuterol-ipratropium (DUO-NEB) 2.5 mg-0.5 mg/3 ml nebu 3 mL by Nebulization route every four (4) hours as needed for Wheezing., Normal, Disp-30 Nebule,R-0      albuterol sulfate 90 mcg/actuation aebs Take 2 Puffs by inhalation every four to six (4-6) hours as needed for Wheezing., Normal, Disp-1 Inhaler,R-0      furosemide (Lasix) 20 mg tablet Take 1 Tab by mouth daily. , Normal, Disp-5 Tab,R-0      hydrOXYzine pamoate (VistariL) 50 mg capsule Take 1 Cap by mouth three (3) times daily as needed for Anxiety for up to 10 doses. , Print, Disp-10 Cap, R-0      cloNIDine HCL (CATAPRES) 0.2 mg tablet Take 1 Tab by mouth two (2) times a day., Normal, Disp-30 Tab, R-1      amLODIPine (NORVASC) 10 mg tablet Take 1 Tab by mouth daily. , Normal, Disp-15 Tab, R-0      ALPRAZolam (Xanax) 0.5 mg tablet Take 1 Tab by mouth every eight (8) hours as needed for Anxiety. Max Daily Amount: 1.5 mg., Print, Disp-20 Tab, R-0      aspirin delayed-release 81 mg tablet Take 1 Tab by mouth daily. , Print, Disp-30 Tab, R-0      ondansetron (ZOFRAN ODT) 4 mg disintegrating tablet Take 1 Tab by mouth every eight (8) hours as needed for Nausea.  Indications: prevent nausea and vomiting after surgery, Print, Disp-20 Tab, R-0 famotidine (PEPCID) 20 mg tablet Take 1 Tab by mouth daily. , Print, Disp-30 Tab, R-1      OXYGEN-AIR DELIVERY SYSTEMS 3 L by IntraNASal route as needed for Other (sob). , Historical Med      docusate sodium (COLACE) 100 mg capsule Take 1 Cap by mouth two (2) times a day., Normal, Disp-120 Cap, R-0      fluticasone furoate-vilanterol (BREO ELLIPTA) 200-25 mcg/dose inhaler Take 1 Puff by inhalation daily. , Historical Med      arformoterol (BROVANA) 15 mcg/2 mL nebu neb solution 2 mL by Nebulization route two (2) times a day., Print, Disp-60 Vial, R-0      budesonide (PULMICORT) 0.5 mg/2 mL nbsp 2 mL by Nebulization route two (2) times a day., Print, Disp-60 Each, R-0      tiotropium (SPIRIVA) 18 mcg inhalation capsule Take 1 Cap by inhalation daily. , Print, Disp-30 Cap, R-0      atorvastatin (LIPITOR) 20 mg tablet Take 1 Tab by mouth nightly. , Print, Disp-30 Tab, R-0      escitalopram oxalate (LEXAPRO) 10 mg tablet Take 1 Tab by mouth every evening., Print, Disp-30 Tab, R-0      metoprolol tartrate (LOPRESSOR) 25 mg tablet Take 0.5 Tabs by mouth every twelve (12) hours. , Print, Disp-30 Tab, R-0      roflumilast (DALIRESP) 500 mcg tab tablet Take 1 Tab by mouth daily. , Print, Disp-30 Tab, R-0      OTHER Incentive spirometry- use as directed, Print, Disp-1 Each, R-0      naloxone (NARCAN) 4 mg/actuation nasal spray Use 1 spray intranasally, then discard. Repeat with new spray every 2 min as needed for opioid overdose symptoms, alternating nostrils. , Print, Disp-1 Each, R-0      montelukast (SINGULAIR) 10 mg tablet Take 1 Tab by mouth nightly. , Print, Disp-30 Tab, R-1           [facility]

## 2020-08-07 LAB
ATRIAL RATE: 55 BPM
ATRIAL RATE: 56 BPM
CALCULATED P AXIS, ECG09: 61 DEGREES
CALCULATED P AXIS, ECG09: 68 DEGREES
CALCULATED R AXIS, ECG10: 48 DEGREES
CALCULATED R AXIS, ECG10: 63 DEGREES
CALCULATED T AXIS, ECG11: -39 DEGREES
CALCULATED T AXIS, ECG11: -48 DEGREES
DIAGNOSIS, 93000: NORMAL
DIAGNOSIS, 93000: NORMAL
P-R INTERVAL, ECG05: 136 MS
P-R INTERVAL, ECG05: 142 MS
Q-T INTERVAL, ECG07: 446 MS
Q-T INTERVAL, ECG07: 454 MS
QRS DURATION, ECG06: 84 MS
QRS DURATION, ECG06: 86 MS
QTC CALCULATION (BEZET), ECG08: 426 MS
QTC CALCULATION (BEZET), ECG08: 438 MS
VENTRICULAR RATE, ECG03: 55 BPM
VENTRICULAR RATE, ECG03: 56 BPM

## 2020-08-07 NOTE — DISCHARGE INSTRUCTIONS
Patient Education        Chest Pain: Care Instructions  Your Care Instructions     There are many things that can cause chest pain. Some are not serious and will get better on their own in a few days. But some kinds of chest pain need more testing and treatment. Your doctor may have recommended a follow-up visit in the next 8 to 12 hours. If you are not getting better, you may need more tests or treatment. Even though your doctor has released you, you still need to watch for any problems. The doctor carefully checked you, but sometimes problems can develop later. If you have new symptoms or if your symptoms do not get better, get medical care right away. If you have worse or different chest pain or pressure that lasts more than 5 minutes or you passed out (lost consciousness), [id number] or seek other emergency help right away. A medical visit is only one step in your treatment. Even if you feel better, you still need to do what your doctor recommends, such as going to all suggested follow-up appointments and taking medicines exactly as directed. This will help you recover and help prevent future problems. How can you care for yourself at home? · Rest until you feel better. · Take your medicine exactly as prescribed. Call your doctor if you think you are having a problem with your medicine. · Do not drive after taking a prescription pain medicine. When should you call for help? [id number]:   · You passed out (lost consciousness). · You have severe difficulty breathing. · You have symptoms of a heart attack. These may include:  ? Chest pain or pressure, or a strange feeling in your chest.  ? Sweating. ? Shortness of breath. ? Nausea or vomiting. ? Pain, pressure, or a strange feeling in your back, neck, jaw, or upper belly or in one or both shoulders or arms. ? Lightheadedness or sudden weakness. ? A fast or irregular heartbeat.   After you call 911, the  may tell you to chew 1 adult-strength or 2 to 4 low-dose aspirin. Wait for an ambulance. Do not try to drive yourself. Call your doctor today if:   · You have any trouble breathing. · Your chest pain gets worse. · You are dizzy or lightheaded, or you feel like you may faint. · You are not getting better as expected. · You are having new or different chest pain. Where can you learn more? Go to http://magda-kadie.info/  Enter A120 in the search box to learn more about \"Chest Pain: Care Instructions. \"  Current as of: June 26, 2019               Content Version: 12.5  © 3202-5627 Healthwise, Incorporated. Care instructions adapted under license by [employer] (which disclaims liability or warranty for this information). If you have questions about a medical condition or this instruction, always ask your healthcare professional. [address] any warranty or liability for your use of this information.

## 2020-08-30 ENCOUNTER — HOSPITAL ENCOUNTER (EMERGENCY)
Age: 55
Discharge: HOME | End: 2020-08-31
Attending: EMERGENCY MEDICINE
Payer: MEDICAID

## 2020-08-30 ENCOUNTER — HOSPITAL ENCOUNTER (EMERGENCY)
Age: 55
Discharge: HOME | End: 2020-08-30
Attending: EMERGENCY MEDICINE
Payer: MEDICAID

## 2020-08-30 ENCOUNTER — APPOINTMENT (OUTPATIENT)
Dept: GENERAL RADIOLOGY | Age: 55
End: 2020-08-30
Attending: EMERGENCY MEDICINE
Payer: MEDICAID

## 2020-08-30 VITALS
HEART RATE: 81 BPM | SYSTOLIC BLOOD PRESSURE: 133 MMHG | TEMPERATURE: 98.6 F | DIASTOLIC BLOOD PRESSURE: 76 MMHG | RESPIRATION RATE: 16 BRPM | OXYGEN SATURATION: 97 %

## 2020-08-30 VITALS
DIASTOLIC BLOOD PRESSURE: 58 MMHG | RESPIRATION RATE: 13 BRPM | SYSTOLIC BLOOD PRESSURE: 127 MMHG | OXYGEN SATURATION: 96 % | TEMPERATURE: 98.9 F | HEART RATE: 73 BPM

## 2020-08-30 DIAGNOSIS — R07.9 CHEST PAIN, UNSPECIFIED TYPE: Primary | ICD-10-CM

## 2020-08-30 DIAGNOSIS — R07.89 ATYPICAL CHEST PAIN: Primary | ICD-10-CM

## 2020-08-30 LAB
ANION GAP SERPL CALC-SCNC: 3 MMOL/L (ref 3–18)
ATRIAL RATE: 68 BPM
BASOPHILS # BLD: 0 K/UL (ref 0–0.1)
BASOPHILS NFR BLD: 0 % (ref 0–2)
BUN SERPL-MCNC: 19 MG/DL (ref 7–18)
BUN/CREAT SERPL: 16 (ref 12–20)
CALCIUM SERPL-MCNC: 8.7 MG/DL (ref 8.5–10.1)
CALCULATED P AXIS, ECG09: 72 DEGREES
CALCULATED R AXIS, ECG10: 56 DEGREES
CALCULATED T AXIS, ECG11: 33 DEGREES
CHLORIDE SERPL-SCNC: 110 MMOL/L (ref 100–111)
CK MB CFR SERPL CALC: 1.4 % (ref 0–4)
CK MB SERPL-MCNC: 1.1 NG/ML (ref 5–25)
CK SERPL-CCNC: 76 U/L (ref 26–192)
CO2 SERPL-SCNC: 28 MMOL/L (ref 21–32)
CREAT SERPL-MCNC: 1.2 MG/DL (ref 0.6–1.3)
DIAGNOSIS, 93000: NORMAL
DIFFERENTIAL METHOD BLD: ABNORMAL
EOSINOPHIL # BLD: 0.1 K/UL (ref 0–0.4)
EOSINOPHIL NFR BLD: 2 % (ref 0–5)
ERYTHROCYTE [DISTWIDTH] IN BLOOD BY AUTOMATED COUNT: 13.4 % (ref 11.6–14.5)
GLUCOSE SERPL-MCNC: 95 MG/DL (ref 74–99)
HCT VFR BLD AUTO: 36.6 % (ref 35–45)
HGB BLD-MCNC: 12 G/DL (ref 12–16)
LYMPHOCYTES # BLD: 2.1 K/UL (ref 0.9–3.6)
LYMPHOCYTES NFR BLD: 36 % (ref 21–52)
MAGNESIUM SERPL-MCNC: 2 MG/DL (ref 1.6–2.6)
MCH RBC QN AUTO: 28.9 PG (ref 24–34)
MCHC RBC AUTO-ENTMCNC: 32.8 G/DL (ref 31–37)
MCV RBC AUTO: 88.2 FL (ref 74–97)
MONOCYTES # BLD: 0.3 K/UL (ref 0.05–1.2)
MONOCYTES NFR BLD: 5 % (ref 3–10)
NEUTS SEG # BLD: 3.3 K/UL (ref 1.8–8)
NEUTS SEG NFR BLD: 57 % (ref 40–73)
P-R INTERVAL, ECG05: 154 MS
PLATELET # BLD AUTO: 216 K/UL (ref 135–420)
PMV BLD AUTO: 10 FL (ref 9.2–11.8)
POTASSIUM SERPL-SCNC: 4.2 MMOL/L (ref 3.5–5.5)
Q-T INTERVAL, ECG07: 426 MS
QRS DURATION, ECG06: 86 MS
QTC CALCULATION (BEZET), ECG08: 452 MS
RBC # BLD AUTO: 4.15 M/UL (ref 4.2–5.3)
SODIUM SERPL-SCNC: 141 MMOL/L (ref 136–145)
TROPONIN I BLD-MCNC: <0.04 NG/ML (ref 0–0.08)
TROPONIN I SERPL-MCNC: <0.02 NG/ML (ref 0–0.04)
VENTRICULAR RATE, ECG03: 68 BPM
WBC # BLD AUTO: 5.8 K/UL (ref 4.6–13.2)

## 2020-08-30 PROCEDURE — 99284 EMERGENCY DEPT VISIT MOD MDM: CPT

## 2020-08-30 PROCEDURE — 85025 COMPLETE CBC W/AUTO DIFF WBC: CPT

## 2020-08-30 PROCEDURE — 71045 X-RAY EXAM CHEST 1 VIEW: CPT

## 2020-08-30 PROCEDURE — 99283 EMERGENCY DEPT VISIT LOW MDM: CPT

## 2020-08-30 PROCEDURE — 83735 ASSAY OF MAGNESIUM: CPT

## 2020-08-30 PROCEDURE — 93005 ELECTROCARDIOGRAM TRACING: CPT

## 2020-08-30 PROCEDURE — 84484 ASSAY OF TROPONIN QUANT: CPT

## 2020-08-30 PROCEDURE — 82550 ASSAY OF CK (CPK): CPT

## 2020-08-30 PROCEDURE — 80048 BASIC METABOLIC PNL TOTAL CA: CPT

## 2020-08-30 RX ORDER — MORPHINE SULFATE 2 MG/ML
1 INJECTION, SOLUTION INTRAMUSCULAR; INTRAVENOUS ONCE
Status: DISCONTINUED | OUTPATIENT
Start: 2020-08-30 | End: 2020-08-31 | Stop reason: HOSPADM

## 2020-08-30 NOTE — ED NOTES
Report given to [facility]. No current signs or symptoms of distress.

## 2020-08-30 NOTE — ED NOTES
Pt refused pepcid IV after partially given. Pt said that she did not want the medication and was angry.

## 2020-08-30 NOTE — DISCHARGE INSTRUCTIONS
Patient Education        Chest Pain: Care Instructions  Your Care Instructions     There are many things that can cause chest pain. Some are not serious and will get better on their own in a few days. But some kinds of chest pain need more testing and treatment. Your doctor may have recommended a follow-up visit in the next 8 to 12 hours. If you are not getting better, you may need more tests or treatment. Even though your doctor has released you, you still need to watch for any problems. The doctor carefully checked you, but sometimes problems can develop later. If you have new symptoms or if your symptoms do not get better, get medical care right away. If you have worse or different chest pain or pressure that lasts more than 5 minutes or you passed out (lost consciousness), [id number] or seek other emergency help right away. A medical visit is only one step in your treatment. Even if you feel better, you still need to do what your doctor recommends, such as going to all suggested follow-up appointments and taking medicines exactly as directed. This will help you recover and help prevent future problems. How can you care for yourself at home? · Rest until you feel better. · Take your medicine exactly as prescribed. Call your doctor if you think you are having a problem with your medicine. · Do not drive after taking a prescription pain medicine. When should you call for help? [id number]:   · You passed out (lost consciousness). · You have severe difficulty breathing. · You have symptoms of a heart attack. These may include:  ? Chest pain or pressure, or a strange feeling in your chest.  ? Sweating. ? Shortness of breath. ? Nausea or vomiting. ? Pain, pressure, or a strange feeling in your back, neck, jaw, or upper belly or in one or both shoulders or arms. ? Lightheadedness or sudden weakness. ? A fast or irregular heartbeat.   After you call 911, the  may tell you to chew 1 adult-strength or 2 to 4 low-dose aspirin. Wait for an ambulance. Do not try to drive yourself. Call your doctor today if:   · You have any trouble breathing. · Your chest pain gets worse. · You are dizzy or lightheaded, or you feel like you may faint. · You are not getting better as expected. · You are having new or different chest pain. Where can you learn more? Go to http://www.ash.com/  Enter A120 in the search box to learn more about \"Chest Pain: Care Instructions. \"  Current as of: June 26, 2019               Content Version: 12.5  © 1178-4188 Healthwise, Incorporated. Care instructions adapted under license by [employer] (which disclaims liability or warranty for this information). If you have questions about a medical condition or this instruction, always ask your healthcare professional. [address] any warranty or liability for your use of this information.

## 2020-08-30 NOTE — ED PROVIDER NOTES
EMERGENCY DEPARTMENT HISTORY AND PHYSICAL EXAM    3:17 AM crowding in the ER, patient seen in hallway bed      Date: 8/30/2020  Patient Name: Montez Patient    History of Presenting Illness     No chief complaint on file. History Provided By: patient    Additional History (Context): Montez Patient is a 63 y.o. female presents with well-known to the emergency department for frequent visits for chest pain, does have history of MI, comes in with another episode of severe 10 out of 10 central sternal radiating to the left arm and the neck, per EMS reports she took aspirin before calling, to nitroglycerin did help with the pain. She states she has a cardiology appointment within the week. She woke up to use the bathroom and leaned over and pain began abruptly. Ann Simon PCP: Mani Melendrez NP    Chief Complaint:   Duration:    Timing:    Location:   Quality:   Severity:   Modifying Factors:   Associated Symptoms:       Current Facility-Administered Medications   Medication Dose Route Frequency Provider Last Rate Last Dose    famotidine (PF) (PEPCID) 20 mg in 0.9% sodium chloride 10 mL injection  20 mg IntraVENous NOW Gerardo Toney MD         Current Outpatient Medications   Medication Sig Dispense Refill    albuterol-ipratropium (DUO-NEB) 2.5 mg-0.5 mg/3 ml nebu 3 mL by Nebulization route every four (4) hours as needed for Wheezing. 30 Nebule 0    albuterol sulfate 90 mcg/actuation aebs Take 2 Puffs by inhalation every four to six (4-6) hours as needed for Wheezing. 1 Inhaler 0    furosemide (Lasix) 20 mg tablet Take 1 Tab by mouth daily. 5 Tab 0    hydrOXYzine pamoate (VistariL) 50 mg capsule Take 1 Cap by mouth three (3) times daily as needed for Anxiety for up to 10 doses. 10 Cap 0    cloNIDine HCL (CATAPRES) 0.2 mg tablet Take 1 Tab by mouth two (2) times a day. 30 Tab 1    amLODIPine (NORVASC) 10 mg tablet Take 1 Tab by mouth daily.  15 Tab 0    ALPRAZolam (Xanax) 0.5 mg tablet Take 1 Tab by mouth every eight (8) hours as needed for Anxiety. Max Daily Amount: 1.5 mg. 20 Tab 0    aspirin delayed-release 81 mg tablet Take 1 Tab by mouth daily. 30 Tab 0    ondansetron (ZOFRAN ODT) 4 mg disintegrating tablet Take 1 Tab by mouth every eight (8) hours as needed for Nausea. Indications: prevent nausea and vomiting after surgery 20 Tab 0    famotidine (PEPCID) 20 mg tablet Take 1 Tab by mouth daily. 30 Tab 1    OXYGEN-AIR DELIVERY SYSTEMS 3 L by IntraNASal route as needed for Other (sob).  docusate sodium (COLACE) 100 mg capsule Take 1 Cap by mouth two (2) times a day. 120 Cap 0    fluticasone furoate-vilanterol (BREO ELLIPTA) 200-25 mcg/dose inhaler Take 1 Puff by inhalation daily.  arformoterol (BROVANA) 15 mcg/2 mL nebu neb solution 2 mL by Nebulization route two (2) times a day. 60 Vial 0    budesonide (PULMICORT) 0.5 mg/2 mL nbsp 2 mL by Nebulization route two (2) times a day. 60 Each 0    tiotropium (SPIRIVA) 18 mcg inhalation capsule Take 1 Cap by inhalation daily. 30 Cap 0    atorvastatin (LIPITOR) 20 mg tablet Take 1 Tab by mouth nightly. 30 Tab 0    escitalopram oxalate (LEXAPRO) 10 mg tablet Take 1 Tab by mouth every evening. 30 Tab 0    metoprolol tartrate (LOPRESSOR) 25 mg tablet Take 0.5 Tabs by mouth every twelve (12) hours. 30 Tab 0    roflumilast (DALIRESP) 500 mcg tab tablet Take 1 Tab by mouth daily. 30 Tab 0    OTHER Incentive spirometry- use as directed 1 Each 0    naloxone (NARCAN) 4 mg/actuation nasal spray Use 1 spray intranasally, then discard. Repeat with new spray every 2 min as needed for opioid overdose symptoms, alternating nostrils. 1 Each 0    montelukast (SINGULAIR) 10 mg tablet Take 1 Tab by mouth nightly.  30 Tab 1       Past History     Past Medical History:  Past Medical History:   Diagnosis Date    Asthma     CHF (congestive heart failure) (HCC)     Chronic obstructive pulmonary disease (HCC)     Dependence on supplemental oxygen     Endocrine disease     thyroid issues    Gastrointestinal disorder     \"blockage in my stomach\"    Hypercholesterolemia     Hypertension        Past Surgical History:  Past Surgical History:   Procedure Laterality Date    HX GI      Exploratory laparotomy with lysis of adhesions and primary repair of incarcerated umbilical hernia       Family History:  No family history on file. Social History:  Social History     Tobacco Use    Smoking status: Current Every Day Smoker     Packs/day: 0.25    Smokeless tobacco: Current User   Substance Use Topics    Alcohol use: No     Comment: socially    Drug use: Not Currently     Types: Heroin       Allergies:  No Known Allergies      Review of Systems     Review of Systems   Constitutional: Negative for diaphoresis and fever. HENT: Negative for congestion and sore throat. Eyes: Negative for pain and itching. Respiratory: Negative for cough and shortness of breath. Cardiovascular: Positive for chest pain. Negative for palpitations. Gastrointestinal: Negative for abdominal pain and diarrhea. Endocrine: Negative for polydipsia and polyuria. Genitourinary: Negative for dysuria and hematuria. Musculoskeletal: Negative for arthralgias and myalgias. Skin: Negative for rash and wound. Neurological: Negative for seizures and syncope. Hematological: Does not bruise/bleed easily. Psychiatric/Behavioral: Negative for agitation and hallucinations. Physical Exam       Patient Vitals for the past 12 hrs:   Temp Pulse Resp BP SpO2   08/30/20 0400 98.9 °F (37.2 °C) 73 13 127/58 96 %       Physical Exam  Vitals signs and nursing note reviewed. Constitutional:       General: She is not in acute distress. Appearance: She is well-developed. She is not toxic-appearing. HENT:      Head: Normocephalic and atraumatic. Eyes:      General: No scleral icterus.      Conjunctiva/sclera: Conjunctivae normal.   Neck:      Musculoskeletal: Normal range of motion and neck supple. Vascular: No JVD. Cardiovascular:      Rate and Rhythm: Normal rate and regular rhythm. Heart sounds: Normal heart sounds. Comments: 4 intact extremity pulses  Pulmonary:      Effort: Pulmonary effort is normal.      Breath sounds: Normal breath sounds. Abdominal:      Palpations: Abdomen is soft. There is no mass. Tenderness: There is no abdominal tenderness. Musculoskeletal: Normal range of motion. Lymphadenopathy:      Cervical: No cervical adenopathy. Skin:     General: Skin is warm and dry. Neurological:      Mental Status: She is alert. Diagnostic Study Results   Labs -  Recent Results (from the past 12 hour(s))   EKG, 12 LEAD, INITIAL    Collection Time: 08/30/20  3:37 AM   Result Value Ref Range    Ventricular Rate 68 BPM    Atrial Rate 68 BPM    P-R Interval 154 ms    QRS Duration 86 ms    Q-T Interval 426 ms    QTC Calculation (Bezet) 452 ms    Calculated P Axis 72 degrees    Calculated R Axis 56 degrees    Calculated T Axis 33 degrees    Diagnosis       Normal sinus rhythm  Normal ECG  When compared with ECG of 06-AUG-2020 20:07,  Nonspecific T wave abnormality has replaced inverted T waves in Inferior   leads  T wave inversion no longer evident in Anterior leads     CBC WITH AUTOMATED DIFF    Collection Time: 08/30/20  5:23 AM   Result Value Ref Range    WBC 5.8 4.6 - 13.2 K/uL    RBC 4.15 (L) 4.20 - 5.30 M/uL    HGB 12.0 12.0 - 16.0 g/dL    HCT 36.6 35.0 - 45.0 %    MCV 88.2 74.0 - 97.0 FL    MCH 28.9 24.0 - 34.0 PG    MCHC 32.8 31.0 - 37.0 g/dL    RDW 13.4 11.6 - 14.5 %    PLATELET 241 661 - 643 K/uL    MPV 10.0 9.2 - 11.8 FL    NEUTROPHILS 57 40 - 73 %    LYMPHOCYTES 36 21 - 52 %    MONOCYTES 5 3 - 10 %    EOSINOPHILS 2 0 - 5 %    BASOPHILS 0 0 - 2 %    ABS. NEUTROPHILS 3.3 1.8 - 8.0 K/UL    ABS. LYMPHOCYTES 2.1 0.9 - 3.6 K/UL    ABS. MONOCYTES 0.3 0.05 - 1.2 K/UL    ABS. EOSINOPHILS 0.1 0.0 - 0.4 K/UL    ABS.  BASOPHILS 0.0 0.0 - 0.1 K/UL    DF AUTOMATED     METABOLIC PANEL, BASIC    Collection Time: 08/30/20  5:23 AM   Result Value Ref Range    Sodium 141 136 - 145 mmol/L    Potassium 4.2 3.5 - 5.5 mmol/L    Chloride 110 100 - 111 mmol/L    CO2 28 21 - 32 mmol/L    Anion gap 3 3.0 - 18 mmol/L    Glucose 95 74 - 99 mg/dL    BUN 19 (H) 7.0 - 18 MG/DL    Creatinine 1.20 0.6 - 1.3 MG/DL    BUN/Creatinine ratio 16 12 - 20      GFR est AA 56 (L) >60 ml/min/1.73m2    GFR est non-AA 46 (L) >60 ml/min/1.73m2    Calcium 8.7 8.5 - 10.1 MG/DL   MAGNESIUM    Collection Time: 08/30/20  5:23 AM   Result Value Ref Range    Magnesium 2.0 1.6 - 2.6 mg/dL   CARDIAC PANEL,(CK, CKMB & TROPONIN)    Collection Time: 08/30/20  5:23 AM   Result Value Ref Range    CK - MB 1.1 <3.6 ng/ml    CK-MB Index 1.4 0.0 - 4.0 %    CK 76 26 - 192 U/L    Troponin-I, QT <0.02 0.0 - 0.045 NG/ML       Radiologic Studies -   XR CHEST PORT    (Results Pending)     No results found. Medications ordered:   Medications   famotidine (PF) (PEPCID) 20 mg in 0.9% sodium chloride 10 mL injection (has no administration in time range)         Medical Decision Making   Initial Medical Decision Making and DDx:     Patient well-known to the emergency department, does have true cardiac disease. Her EKG has left bundle branch block similar to August 3 of this year and she is had left bundle branch previously also bizarre biphasic but narrow complex 1 also so she changes her morphology. Onset of her symptoms is not particular suggestive of new cardiac event in light of her regular visits to the ER. Will rule her out. ED Course: Progress Notes, Reevaluation, and Consults:  ED Course as of Aug 30 0704   Sun Aug 30, 2020   3502 So first EKG done here she has reverted back to her narrow complex rhythm, really appears entirely normal.    [CB]   5740 Waiting on initial labs to be drawn    [CB]      ED Course User Index  [CB] Stacia Rosado MD     6:49 AM her 12th visit in 2 months for chest pain.   She is asking for pain medication because possibly drug-seeking. Gave her famotidine. Will get a second troponin to rule her out and plan on her discharge. Signed out to Dr. Blaine Solorzano at 7 AM pending repeat troponin and anticipate uneventful discharge. I am the first provider for this patient. I reviewed the vital signs, available nursing notes, past medical history, past surgical history, family history and social history. Patient Vitals for the past 12 hrs:   Temp Pulse Resp BP SpO2   08/30/20 0400 98.9 °F (37.2 °C) 73 13 127/58 96 %       Vital Signs-Reviewed the patient's vital signs. Pulse Oximetry Analysis, Cardiac Monitor, 12 lead ekg:     Interpreted by the EP. Records Reviewed: Nursing notes reviewed (Time of Review: 3:17 AM)    Procedures:   Critical Care Time:   Aspirin: (was aspirin given for stroke?)    Diagnosis     Clinical Impression:   1. Chest pain, unspecified type        Disposition:       Follow-up Information     Follow up With Specialties Details Why Contact Info    Lam Lozada NP Nurse Practitioner In 2 days  [address] [id number] [phone]             Patient's Medications   Start Taking    No medications on file   Continue Taking    ALBUTEROL SULFATE 90 MCG/ACTUATION AEBS    Take 2 Puffs by inhalation every four to six (4-6) hours as needed for Wheezing. ALBUTEROL-IPRATROPIUM (DUO-NEB) 2.5 MG-0.5 MG/3 ML NEBU    3 mL by Nebulization route every four (4) hours as needed for Wheezing. ALPRAZOLAM (XANAX) 0.5 MG TABLET    Take 1 Tab by mouth every eight (8) hours as needed for Anxiety. Max Daily Amount: 1.5 mg.    AMLODIPINE (NORVASC) 10 MG TABLET    Take 1 Tab by mouth daily. ARFORMOTEROL (BROVANA) 15 MCG/2 ML NEBU NEB SOLUTION    2 mL by Nebulization route two (2) times a day. ASPIRIN DELAYED-RELEASE 81 MG TABLET    Take 1 Tab by mouth daily. ATORVASTATIN (LIPITOR) 20 MG TABLET    Take 1 Tab by mouth nightly.     BUDESONIDE (PULMICORT) 0.5 MG/2 ML NBSP    2 mL by Nebulization route two (2) times a day. CLONIDINE HCL (CATAPRES) 0.2 MG TABLET    Take 1 Tab by mouth two (2) times a day. DOCUSATE SODIUM (COLACE) 100 MG CAPSULE    Take 1 Cap by mouth two (2) times a day. ESCITALOPRAM OXALATE (LEXAPRO) 10 MG TABLET    Take 1 Tab by mouth every evening. FAMOTIDINE (PEPCID) 20 MG TABLET    Take 1 Tab by mouth daily. FLUTICASONE FUROATE-VILANTEROL (BREO ELLIPTA) 200-25 MCG/DOSE INHALER    Take 1 Puff by inhalation daily. FUROSEMIDE (LASIX) 20 MG TABLET    Take 1 Tab by mouth daily. HYDROXYZINE PAMOATE (VISTARIL) 50 MG CAPSULE    Take 1 Cap by mouth three (3) times daily as needed for Anxiety for up to 10 doses. METOPROLOL TARTRATE (LOPRESSOR) 25 MG TABLET    Take 0.5 Tabs by mouth every twelve (12) hours. MONTELUKAST (SINGULAIR) 10 MG TABLET    Take 1 Tab by mouth nightly. NALOXONE (NARCAN) 4 MG/ACTUATION NASAL SPRAY    Use 1 spray intranasally, then discard. Repeat with new spray every 2 min as needed for opioid overdose symptoms, alternating nostrils. ONDANSETRON (ZOFRAN ODT) 4 MG DISINTEGRATING TABLET    Take 1 Tab by mouth every eight (8) hours as needed for Nausea. Indications: prevent nausea and vomiting after surgery    OTHER    Incentive spirometry- use as directed    OXYGEN-AIR DELIVERY SYSTEMS    3 L by IntraNASal route as needed for Other (sob). ROFLUMILAST (DALIRESP) 500 MCG TAB TABLET    Take 1 Tab by mouth daily. TIOTROPIUM (SPIRIVA) 18 MCG INHALATION CAPSULE    Take 1 Cap by inhalation daily.    These Medications have changed    No medications on file   Stop Taking    No medications on file     _______________________________    Notes:    Arelis Moser MD using Dragon dictation      _______________________________

## 2020-08-31 LAB
ATRIAL RATE: 73 BPM
CALCULATED P AXIS, ECG09: 69 DEGREES
CALCULATED R AXIS, ECG10: -13 DEGREES
CALCULATED T AXIS, ECG11: 109 DEGREES
DIAGNOSIS, 93000: NORMAL
P-R INTERVAL, ECG05: 152 MS
Q-T INTERVAL, ECG07: 448 MS
QRS DURATION, ECG06: 134 MS
QTC CALCULATION (BEZET), ECG08: 493 MS
VENTRICULAR RATE, ECG03: 73 BPM

## 2020-08-31 NOTE — DISCHARGE INSTRUCTIONS
Patient Education        Musculoskeletal Chest Pain: Care Instructions  Your Care Instructions     Chest pain is not always a sign that something is wrong with your heart or that you have another serious problem. The doctor thinks your chest pain is caused by strained muscles or ligaments, inflamed chest cartilage, or another problem in your chest, rather than by your heart. You may need more tests to find the cause of your chest pain. Follow-up care is a key part of your treatment and safety. Be sure to make and go to all appointments, and call your doctor if you are having problems. It's also a good idea to know your test results and keep a list of the medicines you take. How can you care for yourself at home? · Take pain medicines exactly as directed. ? If the doctor gave you a prescription medicine for pain, take it as prescribed. ? If you are not taking a prescription pain medicine, ask your doctor if you can take an over-the-counter medicine. · Rest and protect the sore area. · Stop, change, or take a break from any activity that may be causing your pain or soreness. · Put ice or a cold pack on the sore area for 10 to 20 minutes at a time. Try to do this every 1 to 2 hours for the next 3 days (when you are awake) or until the swelling goes down. Put a thin cloth between the ice and your skin. · After 2 or 3 days, apply a heating pad set on low or a warm cloth to the area that hurts. Some doctors suggest that you go back and forth between hot and cold. · Do not wrap or tape your ribs for support. This may cause you to take smaller breaths, which could increase your risk of lung problems. · Mentholated creams such as Bengay or Icy Hot may soothe sore muscles. Follow the instructions on the package. · Follow your doctor's instructions for exercising. · Gentle stretching and massage may help you get better faster.  Stretch slowly to the point just before pain begins, and hold the stretch for at least 15 to 30 seconds. Do this 3 or 4 times a day. Stretch just after you have applied heat. · As your pain gets better, slowly return to your normal activities. Any increased pain may be a sign that you need to rest a while longer. When should you call for help? [id number] anytime you think you may need emergency care. For example, call if:  · You have chest pain or pressure. This may occur with:  ? Sweating. ? Shortness of breath. ? Nausea or vomiting. ? Pain that spreads from the chest to the neck, jaw, or one or both shoulders or arms. ? Dizziness or lightheadedness. ? A fast or uneven pulse. After calling 911, chew 1 adult-strength aspirin. Wait for an ambulance. Do not try to drive yourself. · You have sudden chest pain and shortness of breath, or you cough up blood. Call your doctor now or seek immediate medical care if:  · You have any trouble breathing. · Your chest pain gets worse. · Your chest pain occurs consistently with exercise and is relieved by rest.  Watch closely for changes in your health, and be sure to contact your doctor if:  · Your chest pain does not get better after 1 week. Where can you learn more? Go to http://magda-kadie.info/  Enter V293 in the search box to learn more about \"Musculoskeletal Chest Pain: Care Instructions. \"  Current as of: June 26, 2019               Content Version: 12.5  © 1664-1300 [employer]. Care instructions adapted under license by [employer] (which disclaims liability or warranty for this information). If you have questions about a medical condition or this instruction, always ask your healthcare professional. [place] [address] any warranty or liability for your use of this information.

## 2020-08-31 NOTE — ED TRIAGE NOTES
PT arrived via EMS stating she was walking to the store and started having pain below her left breast and was unable to walk anymore.

## 2020-08-31 NOTE — ED PROVIDER NOTES
The history is provided by the patient. No  was used. Rib Pain    This is a chronic problem. The current episode started less than 1 hour ago. The problem has not changed since onset. The problem occurs constantly. The pain is associated with movement. The pain is present in the left side. The quality of the pain is described as sharp. The pain does not radiate. The symptoms are aggravated by movement. Pertinent negatives include no abdominal pain, no back pain, no claudication, no cough, no diaphoresis, no dizziness, no exertional chest pressure, no fever, no headaches, no hemoptysis, no irregular heartbeat, no leg pain, no lower extremity edema, no malaise/fatigue, no nausea, no near-syncope, no numbness, no orthopnea, no palpitations, no PND, no shortness of breath, no sputum production, no vomiting and no weakness. She has tried nothing for the symptoms. The treatment provided moderate relief. Risk factors include smoking/tobacco exposure. Her past medical history is significant for HTN and CHF. Her past medical history does not include aneurysm, cancer, DM, DVT or PE. Procedural history includes cardiac catheterization and exercise treadmill test.Pertinent negatives include no echocardiogram, no persantine thallium, no stress echo, no [place] stents, no pacemaker and no CABG. Past Medical History:   Diagnosis Date    Asthma     CHF (congestive heart failure) (HCC)     Chronic obstructive pulmonary disease (HCC)     Dependence on supplemental oxygen     Endocrine disease     thyroid issues    Gastrointestinal disorder     \"blockage in my stomach\"    Hypercholesterolemia     Hypertension        Past Surgical History:   Procedure Laterality Date    HX GI      Exploratory laparotomy with lysis of adhesions and primary repair of incarcerated umbilical hernia         No family history on file.     Social History     Socioeconomic History    Marital status: SINGLE     Spouse name: Not on file    Number of children: Not on file    Years of education: Not on file    Highest education level: Not on file   Occupational History    Not on file   Social Needs    Financial resource strain: Not on file    Food insecurity     Worry: Not on file     Inability: Not on file    Transportation needs     Medical: Not on file     Non-medical: Not on file   Tobacco Use    Smoking status: Current Every Day Smoker     Packs/day: 0.25    Smokeless tobacco: Current User   Substance and Sexual Activity    Alcohol use: No     Comment: socially    Drug use: Not Currently     Types: Heroin    Sexual activity: Not Currently     Comment: last used 9 years ago   Lifestyle    Physical activity     Days per week: Not on file     Minutes per session: Not on file    Stress: Not on file   Relationships    Social connections     Talks on phone: Not on file     Gets together: Not on file     Attends Christian service: Not on file     Active member of club or organization: Not on file     Attends meetings of clubs or organizations: Not on file     Relationship status: Not on file    Intimate partner violence     Fear of current or ex partner: Not on file     Emotionally abused: Not on file     Physically abused: Not on file     Forced sexual activity: Not on file   Other Topics Concern    Not on file   Social History Narrative    Not on file         ALLERGIES: Patient has no known allergies. Review of Systems   Constitutional: Negative for diaphoresis, fever and malaise/fatigue. Respiratory: Negative for cough, hemoptysis, sputum production and shortness of breath. Cardiovascular: Negative for palpitations, orthopnea, claudication, PND and near-syncope. Gastrointestinal: Negative for abdominal pain, nausea and vomiting. Genitourinary: Negative. Musculoskeletal: Negative for back pain. Neurological: Negative for dizziness, weakness, numbness and headaches.    All other systems reviewed and are negative. Vitals:    08/30/20 2247   BP: 133/76   Pulse: 81   Resp: 16   Temp: 98.6 °F (37 °C)   SpO2: 97%            Physical Exam  Vitals signs and nursing note reviewed. Constitutional:       Appearance: Normal appearance. HENT:      Head: Normocephalic and atraumatic. Nose: Nose normal. No congestion or rhinorrhea. Mouth/Throat:      Mouth: Mucous membranes are moist.   Eyes:      Extraocular Movements: Extraocular movements intact. Pupils: Pupils are equal, round, and reactive to light. Neck:      Musculoskeletal: Normal range of motion and neck supple. Cardiovascular:      Rate and Rhythm: Normal rate and regular rhythm. Pulmonary:      Effort: Pulmonary effort is normal.      Breath sounds: Normal breath sounds. Abdominal:      General: There is no distension. Palpations: Abdomen is soft. There is no mass. Tenderness: There is no abdominal tenderness. There is no right CVA tenderness, left CVA tenderness, guarding or rebound. Hernia: No hernia is present. Musculoskeletal: Normal range of motion. Skin:     General: Skin is warm. Capillary Refill: Capillary refill takes less than 2 seconds. Neurological:      General: No focal deficit present. Mental Status: She is alert and oriented to person, place, and time. Psychiatric:         Mood and Affect: Mood normal.         Behavior: Behavior normal.          MDM  Number of Diagnoses or Management Options  Diagnosis management comments: Patient with chronic chest pain presents to the emergency department. She does have we will medical disease. I just read her note she was here yesterday EKG has a chronic left bundle branch block patient's complaint is transient. It sounds like she has had a stress test in the past and has a cardiology appointment September 1. She was recently admitted to [facility] and saw cardiology in house. Has had no recent heart attack.   Reviewed labs and work-up from yesterday which were unremarkable discharge home.         Differential diagnosis: Chest pain, MI, pulmonary embolism          Risk of Complications, Morbidity, and/or Mortality  Presenting problems: moderate  Diagnostic procedures: moderate  Management options: moderate           Procedures

## 2020-09-06 ENCOUNTER — APPOINTMENT (OUTPATIENT)
Dept: GENERAL RADIOLOGY | Age: 55
DRG: 140 | End: 2020-09-06
Attending: EMERGENCY MEDICINE
Payer: MEDICAID

## 2020-09-06 ENCOUNTER — HOSPITAL ENCOUNTER (EMERGENCY)
Age: 55
Discharge: HOME | DRG: 140 | End: 2020-09-06
Attending: EMERGENCY MEDICINE
Payer: MEDICAID

## 2020-09-06 VITALS
TEMPERATURE: 98.1 F | OXYGEN SATURATION: 98 % | RESPIRATION RATE: 22 BRPM | SYSTOLIC BLOOD PRESSURE: 136 MMHG | DIASTOLIC BLOOD PRESSURE: 58 MMHG | HEART RATE: 82 BPM

## 2020-09-06 DIAGNOSIS — J44.1 ACUTE EXACERBATION OF CHRONIC OBSTRUCTIVE PULMONARY DISEASE (COPD) (HCC): Primary | ICD-10-CM

## 2020-09-06 LAB
ALBUMIN SERPL-MCNC: 3.4 G/DL (ref 3.4–5)
ALBUMIN/GLOB SERPL: 1 {RATIO} (ref 0.8–1.7)
ALP SERPL-CCNC: 85 U/L (ref 45–117)
ALT SERPL-CCNC: 16 U/L (ref 13–56)
ANION GAP SERPL CALC-SCNC: 5 MMOL/L (ref 3–18)
AST SERPL-CCNC: 12 U/L (ref 10–38)
ATRIAL RATE: 83 BPM
BASOPHILS # BLD: 0 K/UL (ref 0–0.1)
BASOPHILS NFR BLD: 0 % (ref 0–2)
BILIRUB SERPL-MCNC: 0.2 MG/DL (ref 0.2–1)
BNP SERPL-MCNC: 219 PG/ML (ref 0–900)
BUN SERPL-MCNC: 20 MG/DL (ref 7–18)
BUN/CREAT SERPL: 17 (ref 12–20)
CALCIUM SERPL-MCNC: 8.1 MG/DL (ref 8.5–10.1)
CALCULATED P AXIS, ECG09: 83 DEGREES
CALCULATED R AXIS, ECG10: 69 DEGREES
CALCULATED T AXIS, ECG11: 27 DEGREES
CHLORIDE SERPL-SCNC: 114 MMOL/L (ref 100–111)
CK MB CFR SERPL CALC: 1.6 % (ref 0–4)
CK MB CFR SERPL CALC: 1.6 % (ref 0–4)
CK MB SERPL-MCNC: 1.9 NG/ML (ref 5–25)
CK MB SERPL-MCNC: 2 NG/ML (ref 5–25)
CK SERPL-CCNC: 116 U/L (ref 26–192)
CK SERPL-CCNC: 124 U/L (ref 26–192)
CO2 SERPL-SCNC: 24 MMOL/L (ref 21–32)
CREAT SERPL-MCNC: 1.2 MG/DL (ref 0.6–1.3)
DIAGNOSIS, 93000: NORMAL
DIFFERENTIAL METHOD BLD: ABNORMAL
EOSINOPHIL # BLD: 0.2 K/UL (ref 0–0.4)
EOSINOPHIL NFR BLD: 2 % (ref 0–5)
ERYTHROCYTE [DISTWIDTH] IN BLOOD BY AUTOMATED COUNT: 13.1 % (ref 11.6–14.5)
GLOBULIN SER CALC-MCNC: 3.5 G/DL (ref 2–4)
GLUCOSE SERPL-MCNC: 138 MG/DL (ref 74–99)
HCT VFR BLD AUTO: 35.6 % (ref 35–45)
HGB BLD-MCNC: 11.7 G/DL (ref 12–16)
LYMPHOCYTES # BLD: 1.1 K/UL (ref 0.9–3.6)
LYMPHOCYTES NFR BLD: 13 % (ref 21–52)
MAGNESIUM SERPL-MCNC: 2.9 MG/DL (ref 1.6–2.6)
MCH RBC QN AUTO: 29.1 PG (ref 24–34)
MCHC RBC AUTO-ENTMCNC: 32.9 G/DL (ref 31–37)
MCV RBC AUTO: 88.6 FL (ref 74–97)
MONOCYTES # BLD: 0.4 K/UL (ref 0.05–1.2)
MONOCYTES NFR BLD: 4 % (ref 3–10)
NEUTS SEG # BLD: 7.2 K/UL (ref 1.8–8)
NEUTS SEG NFR BLD: 81 % (ref 40–73)
P-R INTERVAL, ECG05: 142 MS
PLATELET # BLD AUTO: 168 K/UL (ref 135–420)
PMV BLD AUTO: 10.5 FL (ref 9.2–11.8)
POTASSIUM SERPL-SCNC: 2.8 MMOL/L (ref 3.5–5.5)
PROT SERPL-MCNC: 6.9 G/DL (ref 6.4–8.2)
Q-T INTERVAL, ECG07: 324 MS
QRS DURATION, ECG06: 86 MS
QTC CALCULATION (BEZET), ECG08: 380 MS
RBC # BLD AUTO: 4.02 M/UL (ref 4.2–5.3)
SODIUM SERPL-SCNC: 143 MMOL/L (ref 136–145)
TROPONIN I SERPL-MCNC: 0.02 NG/ML (ref 0–0.04)
TROPONIN I SERPL-MCNC: <0.02 NG/ML (ref 0–0.04)
VENTRICULAR RATE, ECG03: 83 BPM
WBC # BLD AUTO: 8.9 K/UL (ref 4.6–13.2)

## 2020-09-06 PROCEDURE — 99285 EMERGENCY DEPT VISIT HI MDM: CPT

## 2020-09-06 PROCEDURE — 74011636637 HC RX REV CODE- 636/637: Performed by: EMERGENCY MEDICINE

## 2020-09-06 PROCEDURE — 80053 COMPREHEN METABOLIC PANEL: CPT

## 2020-09-06 PROCEDURE — 85025 COMPLETE CBC W/AUTO DIFF WBC: CPT

## 2020-09-06 PROCEDURE — 74011250637 HC RX REV CODE- 250/637: Performed by: EMERGENCY MEDICINE

## 2020-09-06 PROCEDURE — 93005 ELECTROCARDIOGRAM TRACING: CPT

## 2020-09-06 PROCEDURE — 94640 AIRWAY INHALATION TREATMENT: CPT

## 2020-09-06 PROCEDURE — 82550 ASSAY OF CK (CPK): CPT

## 2020-09-06 PROCEDURE — 83735 ASSAY OF MAGNESIUM: CPT

## 2020-09-06 PROCEDURE — 83880 ASSAY OF NATRIURETIC PEPTIDE: CPT

## 2020-09-06 PROCEDURE — 71045 X-RAY EXAM CHEST 1 VIEW: CPT

## 2020-09-06 PROCEDURE — 74011000250 HC RX REV CODE- 250: Performed by: EMERGENCY MEDICINE

## 2020-09-06 RX ORDER — IPRATROPIUM BROMIDE AND ALBUTEROL SULFATE 2.5; .5 MG/3ML; MG/3ML
3 SOLUTION RESPIRATORY (INHALATION)
Qty: 30 NEBULE | Refills: 0 | Status: SHIPPED | OUTPATIENT
Start: 2020-09-06 | End: 2020-09-14 | Stop reason: SDUPTHER

## 2020-09-06 RX ORDER — DOXYCYCLINE HYCLATE 100 MG
100 TABLET ORAL 2 TIMES DAILY
Qty: 14 TAB | Refills: 0 | Status: SHIPPED | OUTPATIENT
Start: 2020-09-06 | End: 2020-09-14

## 2020-09-06 RX ORDER — PREDNISONE 50 MG/1
50 TABLET ORAL DAILY
Qty: 5 TAB | Refills: 0 | Status: SHIPPED | OUTPATIENT
Start: 2020-09-06 | End: 2020-09-11

## 2020-09-06 RX ORDER — POTASSIUM CHLORIDE 20 MEQ/1
40 TABLET, EXTENDED RELEASE ORAL
Status: COMPLETED | OUTPATIENT
Start: 2020-09-06 | End: 2020-09-06

## 2020-09-06 RX ORDER — ALBUTEROL SULFATE 0.83 MG/ML
SOLUTION RESPIRATORY (INHALATION)
Status: DISCONTINUED
Start: 2020-09-06 | End: 2020-09-06 | Stop reason: HOSPADM

## 2020-09-06 RX ORDER — ALBUTEROL SULFATE 0.83 MG/ML
5 SOLUTION RESPIRATORY (INHALATION)
Status: COMPLETED | OUTPATIENT
Start: 2020-09-06 | End: 2020-09-06

## 2020-09-06 RX ORDER — PREDNISONE 20 MG/1
60 TABLET ORAL
Status: COMPLETED | OUTPATIENT
Start: 2020-09-06 | End: 2020-09-06

## 2020-09-06 RX ADMIN — POTASSIUM CHLORIDE 40 MEQ: 1500 TABLET, EXTENDED RELEASE ORAL at 03:13

## 2020-09-06 RX ADMIN — PREDNISONE 60 MG: 20 TABLET ORAL at 01:22

## 2020-09-06 RX ADMIN — POTASSIUM CHLORIDE 40 MEQ: 1500 TABLET, EXTENDED RELEASE ORAL at 05:43

## 2020-09-06 RX ADMIN — ALBUTEROL SULFATE 5 MG: 2.5 SOLUTION RESPIRATORY (INHALATION) at 06:15

## 2020-09-06 NOTE — ED PROVIDER NOTES
EMERGENCY DEPARTMENT HISTORY AND PHYSICAL EXAM  This was created with voice recognition software and transcription errors may be present. 1:24 AM  Date: 9/6/2020  Patient Name: Rosita Townsend    History of Presenting Illness     Chief Complaint:    History Provided By:     HPI: Rosita Townsend is a 63 y.o. female past medical history of asthma CHF COPD supplemental oxygen thyroid \"issues\" hypertension high cholesterol presents with shortness of breath. Patient's been at home with shortness of breath and wheezing all day. No fevers no chills. She is coughing. Associated with chest pain. Patient's been using albuterol at home. EMS actually showed up on scene earlier today but patient did not want to come in so she called later this evening and presents the emergency department now. She was given site Medrol and mag in route. PCP: Nu Burnham NP      Past History     Past Medical History:  Past Medical History:   Diagnosis Date    Asthma     CHF (congestive heart failure) (HCC)     Chronic obstructive pulmonary disease (HCC)     Dependence on supplemental oxygen     Endocrine disease     thyroid issues    Gastrointestinal disorder     \"blockage in my stomach\"    Hypercholesterolemia     Hypertension        Past Surgical History:  Past Surgical History:   Procedure Laterality Date    HX GI      Exploratory laparotomy with lysis of adhesions and primary repair of incarcerated umbilical hernia       Family History:  No family history on file. Social History:  Social History     Tobacco Use    Smoking status: Current Every Day Smoker     Packs/day: 0.25    Smokeless tobacco: Current User   Substance Use Topics    Alcohol use: No     Comment: socially    Drug use: Not Currently     Types: Heroin       Allergies:  No Known Allergies    Review of Systems     Review of Systems   Constitutional: Negative for chills. HENT: Negative for congestion. Respiratory: Negative for apnea.     All other systems reviewed and are negative. 10 point review of systems otherwise negative unless noted in HPI. Physical Exam       Physical Exam  Constitutional:       Appearance: She is well-developed. HENT:      Head: Normocephalic and atraumatic. Eyes:      Pupils: Pupils are equal, round, and reactive to light. Neck:      Musculoskeletal: Normal range of motion and neck supple. Cardiovascular:      Rate and Rhythm: Normal rate and regular rhythm. Heart sounds: Normal heart sounds. No murmur. No friction rub. Pulmonary:      Effort: Pulmonary effort is normal. No respiratory distress. Breath sounds: Wheezing present. Comments: Bilateral expiratory wheeze diffusely  Abdominal:      General: There is no distension. Palpations: Abdomen is soft. Tenderness: There is no abdominal tenderness. There is no guarding or rebound. Musculoskeletal: Normal range of motion. Skin:     General: Skin is warm and dry. Neurological:      Mental Status: She is alert and oriented to person, place, and time. Psychiatric:         Behavior: Behavior normal.         Thought Content: Thought content normal.         Diagnostic Study Results     Vital Signs  EKG: EKG shows sinus at 83 with a normal axis normal intervals there is no ST elevation or depression and no hypertrophy. Patient does appear to have an intermittent bundle block that is coming and goingLabs:   Imaging: ? chf    Medical Decision Making     ED Course: Progress Notes, Reevaluation, and Consults:      Provider Notes (Medical Decision Making): This is 70-year-old female well-known to me presents with acute exacerbation of asthma. Patient has moderate wheeze she is fairly short of breath. Associated with some chest pain but I think the wheezing is a primary symptom. Will check a chest x-ray she is been given mag and Solu-Medrol will give her dose of prednisone here for longer duration of coverage and continue with assessment. We did discuss if she wants to go on BiPAP at this point she does not and her sats are good I think that is reasonable    X-ray is concerning for heart failure. Either that or potentially aspiration we will get an official report from radiology        IMPRESSION  Impression:  1. No acute infiltrates or effusions.     2. Borderline pulmonary edema. Patient is on 3 L home oxygen she is on that now. Feeling better. She is coughing yellow sputum we will treat that. I do think she has heart failure BNP is normal.  Will discharge for outpatient follow-up potassium has been repleted twice mag was normal         Diagnosis     Clinical Impression: No diagnosis found. Disposition:    Patient's Medications   Start Taking    No medications on file   Continue Taking    ALBUTEROL SULFATE 90 MCG/ACTUATION AEBS    Take 2 Puffs by inhalation every four to six (4-6) hours as needed for Wheezing. ALBUTEROL-IPRATROPIUM (DUO-NEB) 2.5 MG-0.5 MG/3 ML NEBU    3 mL by Nebulization route every four (4) hours as needed for Wheezing. ALPRAZOLAM (XANAX) 0.5 MG TABLET    Take 1 Tab by mouth every eight (8) hours as needed for Anxiety. Max Daily Amount: 1.5 mg.    AMLODIPINE (NORVASC) 10 MG TABLET    Take 1 Tab by mouth daily. ARFORMOTEROL (BROVANA) 15 MCG/2 ML NEBU NEB SOLUTION    2 mL by Nebulization route two (2) times a day. ASPIRIN DELAYED-RELEASE 81 MG TABLET    Take 1 Tab by mouth daily. ATORVASTATIN (LIPITOR) 20 MG TABLET    Take 1 Tab by mouth nightly. BUDESONIDE (PULMICORT) 0.5 MG/2 ML NBSP    2 mL by Nebulization route two (2) times a day. CLONIDINE HCL (CATAPRES) 0.2 MG TABLET    Take 1 Tab by mouth two (2) times a day. DOCUSATE SODIUM (COLACE) 100 MG CAPSULE    Take 1 Cap by mouth two (2) times a day. ESCITALOPRAM OXALATE (LEXAPRO) 10 MG TABLET    Take 1 Tab by mouth every evening. FAMOTIDINE (PEPCID) 20 MG TABLET    Take 1 Tab by mouth daily.     FLUTICASONE FUROATE-VILANTEROL (BREO ELLIPTA) 200-25 MCG/DOSE INHALER    Take 1 Puff by inhalation daily. FUROSEMIDE (LASIX) 20 MG TABLET    Take 1 Tab by mouth daily. HYDROXYZINE PAMOATE (VISTARIL) 50 MG CAPSULE    Take 1 Cap by mouth three (3) times daily as needed for Anxiety for up to 10 doses. METOPROLOL TARTRATE (LOPRESSOR) 25 MG TABLET    Take 0.5 Tabs by mouth every twelve (12) hours. MONTELUKAST (SINGULAIR) 10 MG TABLET    Take 1 Tab by mouth nightly. NALOXONE (NARCAN) 4 MG/ACTUATION NASAL SPRAY    Use 1 spray intranasally, then discard. Repeat with new spray every 2 min as needed for opioid overdose symptoms, alternating nostrils. ONDANSETRON (ZOFRAN ODT) 4 MG DISINTEGRATING TABLET    Take 1 Tab by mouth every eight (8) hours as needed for Nausea. Indications: prevent nausea and vomiting after surgery    OTHER    Incentive spirometry- use as directed    OXYGEN-AIR DELIVERY SYSTEMS    3 L by IntraNASal route as needed for Other (sob). ROFLUMILAST (DALIRESP) 500 MCG TAB TABLET    Take 1 Tab by mouth daily. TIOTROPIUM (SPIRIVA) 18 MCG INHALATION CAPSULE    Take 1 Cap by inhalation daily.    These Medications have changed    No medications on file   Stop Taking    No medications on file

## 2020-09-06 NOTE — ED TRIAGE NOTES
Pt arrived by ems for wheezing and shortness of breath. Pt has been experiencing asthma attacks throughout the day. Pt states she has called ems more than once today and has refused treatment and transport. Pt states began experiencing the SOB today today while dealing with family issues. Pt used her home nebulizer without relief and ems repeated two treatments prior to arrival. Pt was given 125 mg Iv solumedrol and 2 gm IVPB magnesium.

## 2020-09-07 ENCOUNTER — HOSPITAL ENCOUNTER (EMERGENCY)
Age: 55
Discharge: LEFT BEFORE DISCHARGE | DRG: 140 | End: 2020-09-07
Attending: EMERGENCY MEDICINE | Admitting: INTERNAL MEDICINE
Payer: MEDICAID

## 2020-09-07 ENCOUNTER — HOSPITAL ENCOUNTER (INPATIENT)
Age: 55
LOS: 2 days | Discharge: LEFT BEFORE DISCHARGE | DRG: 140 | End: 2020-09-09
Attending: EMERGENCY MEDICINE | Admitting: INTERNAL MEDICINE
Payer: MEDICAID

## 2020-09-07 ENCOUNTER — APPOINTMENT (OUTPATIENT)
Dept: GENERAL RADIOLOGY | Age: 55
DRG: 140 | End: 2020-09-07
Attending: EMERGENCY MEDICINE
Payer: MEDICAID

## 2020-09-07 VITALS
OXYGEN SATURATION: 100 % | RESPIRATION RATE: 21 BRPM | TEMPERATURE: 98 F | SYSTOLIC BLOOD PRESSURE: 154 MMHG | DIASTOLIC BLOOD PRESSURE: 78 MMHG | HEART RATE: 103 BPM

## 2020-09-07 DIAGNOSIS — J44.1 ACUTE EXACERBATION OF CHRONIC OBSTRUCTIVE PULMONARY DISEASE (COPD) (HCC): Primary | ICD-10-CM

## 2020-09-07 DIAGNOSIS — Z20.822 SUSPECTED COVID-19 VIRUS INFECTION: ICD-10-CM

## 2020-09-07 DIAGNOSIS — R06.03 ACUTE RESPIRATORY DISTRESS: ICD-10-CM

## 2020-09-07 DIAGNOSIS — J44.1 COPD WITH ACUTE EXACERBATION (HCC): Primary | ICD-10-CM

## 2020-09-07 DIAGNOSIS — I44.7 LBBB (LEFT BUNDLE BRANCH BLOCK): ICD-10-CM

## 2020-09-07 PROBLEM — R00.0 TACHYCARDIA: Status: ACTIVE | Noted: 2020-09-07

## 2020-09-07 PROBLEM — D64.9 NORMOCYTIC ANEMIA: Status: ACTIVE | Noted: 2020-09-07

## 2020-09-07 PROBLEM — J96.11 CHRONIC RESPIRATORY FAILURE WITH HYPOXIA (HCC): Status: ACTIVE | Noted: 2020-09-07

## 2020-09-07 PROBLEM — J20.9 ACUTE BRONCHITIS: Status: ACTIVE | Noted: 2020-09-07

## 2020-09-07 LAB
ALBUMIN SERPL-MCNC: 3.8 G/DL (ref 3.4–5)
ALBUMIN/GLOB SERPL: 1.2 {RATIO} (ref 0.8–1.7)
ALP SERPL-CCNC: 86 U/L (ref 45–117)
ALT SERPL-CCNC: 20 U/L (ref 13–56)
AMPHET UR QL SCN: NEGATIVE
ANION GAP SERPL CALC-SCNC: 5 MMOL/L (ref 3–18)
ANION GAP SERPL CALC-SCNC: 7 MMOL/L (ref 3–18)
APPEARANCE UR: ABNORMAL
AST SERPL-CCNC: 8 U/L (ref 10–38)
ATRIAL RATE: 73 BPM
ATRIAL RATE: 83 BPM
BACTERIA URNS QL MICRO: NEGATIVE /HPF
BARBITURATES UR QL SCN: NEGATIVE
BASOPHILS # BLD: 0 K/UL (ref 0–0.1)
BASOPHILS # BLD: 0 K/UL (ref 0–0.1)
BASOPHILS NFR BLD: 0 % (ref 0–2)
BASOPHILS NFR BLD: 0 % (ref 0–2)
BENZODIAZ UR QL: NEGATIVE
BILIRUB SERPL-MCNC: 0.3 MG/DL (ref 0.2–1)
BILIRUB UR QL: NEGATIVE
BNP SERPL-MCNC: 684 PG/ML (ref 0–900)
BUN SERPL-MCNC: 12 MG/DL (ref 7–18)
BUN SERPL-MCNC: 13 MG/DL (ref 7–18)
BUN/CREAT SERPL: 12 (ref 12–20)
BUN/CREAT SERPL: 14 (ref 12–20)
CALCIUM SERPL-MCNC: 8.7 MG/DL (ref 8.5–10.1)
CALCIUM SERPL-MCNC: 9.2 MG/DL (ref 8.5–10.1)
CALCULATED P AXIS, ECG09: 79 DEGREES
CALCULATED P AXIS, ECG09: 90 DEGREES
CALCULATED R AXIS, ECG10: -23 DEGREES
CALCULATED R AXIS, ECG10: 56 DEGREES
CALCULATED T AXIS, ECG11: 44 DEGREES
CALCULATED T AXIS, ECG11: 70 DEGREES
CANNABINOIDS UR QL SCN: NEGATIVE
CHLORIDE SERPL-SCNC: 107 MMOL/L (ref 100–111)
CHLORIDE SERPL-SCNC: 110 MMOL/L (ref 100–111)
CK MB CFR SERPL CALC: 2.7 % (ref 0–4)
CK MB CFR SERPL CALC: 3.2 % (ref 0–4)
CK MB SERPL-MCNC: 1.9 NG/ML (ref 5–25)
CK MB SERPL-MCNC: 2.2 NG/ML (ref 5–25)
CK SERPL-CCNC: 68 U/L (ref 26–192)
CK SERPL-CCNC: 70 U/L (ref 26–192)
CO2 SERPL-SCNC: 25 MMOL/L (ref 21–32)
CO2 SERPL-SCNC: 27 MMOL/L (ref 21–32)
COCAINE UR QL SCN: POSITIVE
COLOR UR: YELLOW
CREAT SERPL-MCNC: 0.86 MG/DL (ref 0.6–1.3)
CREAT SERPL-MCNC: 1.07 MG/DL (ref 0.6–1.3)
DIAGNOSIS, 93000: NORMAL
DIAGNOSIS, 93000: NORMAL
DIFFERENTIAL METHOD BLD: ABNORMAL
DIFFERENTIAL METHOD BLD: ABNORMAL
EOSINOPHIL # BLD: 0 K/UL (ref 0–0.4)
EOSINOPHIL # BLD: 0.1 K/UL (ref 0–0.4)
EOSINOPHIL NFR BLD: 0 % (ref 0–5)
EOSINOPHIL NFR BLD: 1 % (ref 0–5)
EPITH CASTS URNS QL MICRO: NORMAL /LPF (ref 0–5)
ERYTHROCYTE [DISTWIDTH] IN BLOOD BY AUTOMATED COUNT: 13.7 % (ref 11.6–14.5)
ERYTHROCYTE [DISTWIDTH] IN BLOOD BY AUTOMATED COUNT: 13.8 % (ref 11.6–14.5)
EST. AVERAGE GLUCOSE BLD GHB EST-MCNC: 105 MG/DL
GLOBULIN SER CALC-MCNC: 3.3 G/DL (ref 2–4)
GLUCOSE SERPL-MCNC: 143 MG/DL (ref 74–99)
GLUCOSE SERPL-MCNC: 146 MG/DL (ref 74–99)
GLUCOSE UR STRIP.AUTO-MCNC: 100 MG/DL
HBA1C MFR BLD: 5.3 % (ref 4.2–5.6)
HCT VFR BLD AUTO: 32.6 % (ref 35–45)
HCT VFR BLD AUTO: 37.3 % (ref 35–45)
HDSCOM,HDSCOM: ABNORMAL
HGB BLD-MCNC: 10.8 G/DL (ref 12–16)
HGB BLD-MCNC: 12.4 G/DL (ref 12–16)
HGB UR QL STRIP: NEGATIVE
KETONES UR QL STRIP.AUTO: NEGATIVE MG/DL
LEUKOCYTE ESTERASE UR QL STRIP.AUTO: NEGATIVE
LYMPHOCYTES # BLD: 0.6 K/UL (ref 0.9–3.6)
LYMPHOCYTES # BLD: 1.8 K/UL (ref 0.9–3.6)
LYMPHOCYTES NFR BLD: 18 % (ref 21–52)
LYMPHOCYTES NFR BLD: 5 % (ref 21–52)
MCH RBC QN AUTO: 29 PG (ref 24–34)
MCH RBC QN AUTO: 29.3 PG (ref 24–34)
MCHC RBC AUTO-ENTMCNC: 33.1 G/DL (ref 31–37)
MCHC RBC AUTO-ENTMCNC: 33.2 G/DL (ref 31–37)
MCV RBC AUTO: 87.6 FL (ref 74–97)
MCV RBC AUTO: 88.2 FL (ref 74–97)
METHADONE UR QL: POSITIVE
MONOCYTES # BLD: 0.1 K/UL (ref 0.05–1.2)
MONOCYTES # BLD: 0.4 K/UL (ref 0.05–1.2)
MONOCYTES NFR BLD: 1 % (ref 3–10)
MONOCYTES NFR BLD: 4 % (ref 3–10)
NEUTS SEG # BLD: 11.2 K/UL (ref 1.8–8)
NEUTS SEG # BLD: 7.6 K/UL (ref 1.8–8)
NEUTS SEG NFR BLD: 77 % (ref 40–73)
NEUTS SEG NFR BLD: 94 % (ref 40–73)
NITRITE UR QL STRIP.AUTO: NEGATIVE
OPIATES UR QL: POSITIVE
P-R INTERVAL, ECG05: 134 MS
P-R INTERVAL, ECG05: 138 MS
PCP UR QL: NEGATIVE
PH UR STRIP: 5.5 [PH] (ref 5–8)
PLATELET # BLD AUTO: 188 K/UL (ref 135–420)
PLATELET # BLD AUTO: 228 K/UL (ref 135–420)
PMV BLD AUTO: 10.4 FL (ref 9.2–11.8)
PMV BLD AUTO: 10.5 FL (ref 9.2–11.8)
POTASSIUM SERPL-SCNC: 3.5 MMOL/L (ref 3.5–5.5)
POTASSIUM SERPL-SCNC: 4.6 MMOL/L (ref 3.5–5.5)
PROT SERPL-MCNC: 7.1 G/DL (ref 6.4–8.2)
PROT UR STRIP-MCNC: ABNORMAL MG/DL
Q-T INTERVAL, ECG07: 436 MS
Q-T INTERVAL, ECG07: 450 MS
QRS DURATION, ECG06: 140 MS
QRS DURATION, ECG06: 92 MS
QTC CALCULATION (BEZET), ECG08: 480 MS
QTC CALCULATION (BEZET), ECG08: 528 MS
RBC # BLD AUTO: 3.72 M/UL (ref 4.2–5.3)
RBC # BLD AUTO: 4.23 M/UL (ref 4.2–5.3)
RBC #/AREA URNS HPF: NORMAL /HPF (ref 0–5)
SODIUM SERPL-SCNC: 139 MMOL/L (ref 136–145)
SODIUM SERPL-SCNC: 142 MMOL/L (ref 136–145)
SP GR UR REFRACTOMETRY: 1.03 (ref 1–1.03)
TROPONIN I SERPL-MCNC: <0.02 NG/ML (ref 0–0.04)
TROPONIN I SERPL-MCNC: <0.02 NG/ML (ref 0–0.04)
UROBILINOGEN UR QL STRIP.AUTO: 1 EU/DL (ref 0.2–1)
VENTRICULAR RATE, ECG03: 73 BPM
VENTRICULAR RATE, ECG03: 83 BPM
WBC # BLD AUTO: 11.9 K/UL (ref 4.6–13.2)
WBC # BLD AUTO: 9.9 K/UL (ref 4.6–13.2)
WBC URNS QL MICRO: NEGATIVE /HPF (ref 0–4)

## 2020-09-07 PROCEDURE — 65660000000 HC RM CCU STEPDOWN

## 2020-09-07 PROCEDURE — 65660000004 HC RM CVT STEPDOWN

## 2020-09-07 PROCEDURE — 74011000250 HC RX REV CODE- 250: Performed by: EMERGENCY MEDICINE

## 2020-09-07 PROCEDURE — 94660 CPAP INITIATION&MGMT: CPT

## 2020-09-07 PROCEDURE — 94640 AIRWAY INHALATION TREATMENT: CPT

## 2020-09-07 PROCEDURE — 85025 COMPLETE CBC W/AUTO DIFF WBC: CPT

## 2020-09-07 PROCEDURE — 74011250637 HC RX REV CODE- 250/637: Performed by: NURSE PRACTITIONER

## 2020-09-07 PROCEDURE — 96376 TX/PRO/DX INJ SAME DRUG ADON: CPT

## 2020-09-07 PROCEDURE — 80307 DRUG TEST PRSMV CHEM ANLYZR: CPT

## 2020-09-07 PROCEDURE — 96374 THER/PROPH/DIAG INJ IV PUSH: CPT

## 2020-09-07 PROCEDURE — 93005 ELECTROCARDIOGRAM TRACING: CPT

## 2020-09-07 PROCEDURE — 71045 X-RAY EXAM CHEST 1 VIEW: CPT

## 2020-09-07 PROCEDURE — 81001 URINALYSIS AUTO W/SCOPE: CPT

## 2020-09-07 PROCEDURE — 87635 SARS-COV-2 COVID-19 AMP PRB: CPT

## 2020-09-07 PROCEDURE — 83880 ASSAY OF NATRIURETIC PEPTIDE: CPT

## 2020-09-07 PROCEDURE — 99285 EMERGENCY DEPT VISIT HI MDM: CPT

## 2020-09-07 PROCEDURE — 96375 TX/PRO/DX INJ NEW DRUG ADDON: CPT

## 2020-09-07 PROCEDURE — 74011250636 HC RX REV CODE- 250/636: Performed by: EMERGENCY MEDICINE

## 2020-09-07 PROCEDURE — 80053 COMPREHEN METABOLIC PANEL: CPT

## 2020-09-07 PROCEDURE — 74011250636 HC RX REV CODE- 250/636: Performed by: NURSE PRACTITIONER

## 2020-09-07 PROCEDURE — 83036 HEMOGLOBIN GLYCOSYLATED A1C: CPT

## 2020-09-07 PROCEDURE — 74011000250 HC RX REV CODE- 250: Performed by: NURSE PRACTITIONER

## 2020-09-07 PROCEDURE — 74011000250 HC RX REV CODE- 250: Performed by: INTERNAL MEDICINE

## 2020-09-07 PROCEDURE — 82550 ASSAY OF CK (CPK): CPT

## 2020-09-07 PROCEDURE — 99284 EMERGENCY DEPT VISIT MOD MDM: CPT

## 2020-09-07 RX ORDER — MAGNESIUM SULFATE 100 %
4 CRYSTALS MISCELLANEOUS AS NEEDED
Status: DISCONTINUED | OUTPATIENT
Start: 2020-09-07 | End: 2020-09-09 | Stop reason: HOSPADM

## 2020-09-07 RX ORDER — FAMOTIDINE 20 MG/1
20 TABLET, FILM COATED ORAL DAILY
Status: DISCONTINUED | OUTPATIENT
Start: 2020-09-08 | End: 2020-09-09 | Stop reason: HOSPADM

## 2020-09-07 RX ORDER — METOPROLOL TARTRATE 25 MG/1
12.5 TABLET, FILM COATED ORAL EVERY 12 HOURS
Status: DISCONTINUED | OUTPATIENT
Start: 2020-09-07 | End: 2020-09-09 | Stop reason: HOSPADM

## 2020-09-07 RX ORDER — ASCORBIC ACID 250 MG
500 TABLET ORAL DAILY
Status: DISCONTINUED | OUTPATIENT
Start: 2020-09-08 | End: 2020-09-09 | Stop reason: HOSPADM

## 2020-09-07 RX ORDER — AMLODIPINE BESYLATE 10 MG/1
10 TABLET ORAL DAILY
Status: DISCONTINUED | OUTPATIENT
Start: 2020-09-08 | End: 2020-09-09 | Stop reason: HOSPADM

## 2020-09-07 RX ORDER — LISINOPRIL 20 MG/1
TABLET ORAL
COMMUNITY
Start: 2019-02-08 | End: 2020-10-10

## 2020-09-07 RX ORDER — IPRATROPIUM BROMIDE AND ALBUTEROL SULFATE 2.5; .5 MG/3ML; MG/3ML
3 SOLUTION RESPIRATORY (INHALATION) ONCE
Status: COMPLETED | OUTPATIENT
Start: 2020-09-07 | End: 2020-09-07

## 2020-09-07 RX ORDER — HYDROXYZINE 25 MG/1
50 TABLET, FILM COATED ORAL
Status: CANCELLED | OUTPATIENT
Start: 2020-09-07

## 2020-09-07 RX ORDER — FUROSEMIDE 20 MG/1
20 TABLET ORAL DAILY
Status: CANCELLED | OUTPATIENT
Start: 2020-09-08

## 2020-09-07 RX ORDER — HYDROXYZINE 50 MG/1
TABLET, FILM COATED ORAL
COMMUNITY
End: 2020-09-14 | Stop reason: SDUPTHER

## 2020-09-07 RX ORDER — SODIUM CHLORIDE 0.9 % (FLUSH) 0.9 %
5-40 SYRINGE (ML) INJECTION EVERY 8 HOURS
Status: DISCONTINUED | OUTPATIENT
Start: 2020-09-07 | End: 2020-09-09 | Stop reason: HOSPADM

## 2020-09-07 RX ORDER — POLYETHYLENE GLYCOL 3350 17 G/17G
17 POWDER, FOR SOLUTION ORAL DAILY PRN
Status: DISCONTINUED | OUTPATIENT
Start: 2020-09-07 | End: 2020-09-09 | Stop reason: HOSPADM

## 2020-09-07 RX ORDER — FAMOTIDINE 20 MG/1
20 TABLET, FILM COATED ORAL 2 TIMES DAILY
Status: DISCONTINUED | OUTPATIENT
Start: 2020-09-07 | End: 2020-09-07 | Stop reason: HOSPADM

## 2020-09-07 RX ORDER — ASPIRIN 81 MG/1
81 TABLET ORAL DAILY
Status: DISCONTINUED | OUTPATIENT
Start: 2020-09-08 | End: 2020-09-07 | Stop reason: HOSPADM

## 2020-09-07 RX ORDER — ISOSORBIDE MONONITRATE 30 MG/1
30 TABLET, EXTENDED RELEASE ORAL DAILY
COMMUNITY
Start: 2020-08-08 | End: 2020-10-10

## 2020-09-07 RX ORDER — ZINC SULFATE 50(220)MG
1 CAPSULE ORAL DAILY
Status: DISCONTINUED | OUTPATIENT
Start: 2020-09-08 | End: 2020-09-09 | Stop reason: HOSPADM

## 2020-09-07 RX ORDER — HYDROCHLOROTHIAZIDE 25 MG/1
TABLET ORAL
COMMUNITY
End: 2020-10-10

## 2020-09-07 RX ORDER — BUDESONIDE 0.5 MG/2ML
1000 INHALANT ORAL
Status: DISCONTINUED | OUTPATIENT
Start: 2020-09-07 | End: 2020-09-07 | Stop reason: HOSPADM

## 2020-09-07 RX ORDER — LISINOPRIL 20 MG/1
20 TABLET ORAL DAILY
Status: CANCELLED | OUTPATIENT
Start: 2020-09-08

## 2020-09-07 RX ORDER — SODIUM CHLORIDE 0.9 % (FLUSH) 0.9 %
5-40 SYRINGE (ML) INJECTION EVERY 8 HOURS
Status: CANCELLED | OUTPATIENT
Start: 2020-09-07

## 2020-09-07 RX ORDER — LISINOPRIL 20 MG/1
20 TABLET ORAL DAILY
Status: DISCONTINUED | OUTPATIENT
Start: 2020-09-08 | End: 2020-09-09 | Stop reason: HOSPADM

## 2020-09-07 RX ORDER — GEMFIBROZIL 600 MG/1
TABLET, FILM COATED ORAL
COMMUNITY
Start: 2019-07-24 | End: 2020-12-01

## 2020-09-07 RX ORDER — CITALOPRAM 20 MG/1
TABLET, FILM COATED ORAL
Status: ON HOLD | COMMUNITY
Start: 2018-12-17 | End: 2020-11-30 | Stop reason: SDUPTHER

## 2020-09-07 RX ORDER — ALBUTEROL SULFATE 0.83 MG/ML
2.5 SOLUTION RESPIRATORY (INHALATION)
Status: COMPLETED | OUTPATIENT
Start: 2020-09-07 | End: 2020-09-07

## 2020-09-07 RX ORDER — SODIUM CHLORIDE 0.9 % (FLUSH) 0.9 %
5-40 SYRINGE (ML) INJECTION AS NEEDED
Status: DISCONTINUED | OUTPATIENT
Start: 2020-09-07 | End: 2020-09-09 | Stop reason: HOSPADM

## 2020-09-07 RX ORDER — ONDANSETRON 4 MG/1
4 TABLET, ORALLY DISINTEGRATING ORAL
Status: DISCONTINUED | OUTPATIENT
Start: 2020-09-07 | End: 2020-09-09 | Stop reason: HOSPADM

## 2020-09-07 RX ORDER — HYDROXYZINE 25 MG/1
50 TABLET, FILM COATED ORAL
Status: DISCONTINUED | OUTPATIENT
Start: 2020-09-07 | End: 2020-09-09 | Stop reason: HOSPADM

## 2020-09-07 RX ORDER — METOPROLOL TARTRATE 25 MG/1
12.5 TABLET, FILM COATED ORAL EVERY 12 HOURS
Status: DISCONTINUED | OUTPATIENT
Start: 2020-09-07 | End: 2020-09-07 | Stop reason: HOSPADM

## 2020-09-07 RX ORDER — DEXTROSE 50 % IN WATER (D50W) INTRAVENOUS SYRINGE
25-50 AS NEEDED
Status: DISCONTINUED | OUTPATIENT
Start: 2020-09-07 | End: 2020-09-07 | Stop reason: HOSPADM

## 2020-09-07 RX ORDER — IPRATROPIUM BROMIDE AND ALBUTEROL SULFATE 2.5; .5 MG/3ML; MG/3ML
3 SOLUTION RESPIRATORY (INHALATION)
Status: DISCONTINUED | OUTPATIENT
Start: 2020-09-08 | End: 2020-09-08

## 2020-09-07 RX ORDER — MAGNESIUM SULFATE 100 %
4 CRYSTALS MISCELLANEOUS AS NEEDED
Status: DISCONTINUED | OUTPATIENT
Start: 2020-09-07 | End: 2020-09-07 | Stop reason: HOSPADM

## 2020-09-07 RX ORDER — DEXTROSE 50 % IN WATER (D50W) INTRAVENOUS SYRINGE
25-50 AS NEEDED
Status: DISCONTINUED | OUTPATIENT
Start: 2020-09-07 | End: 2020-09-09 | Stop reason: HOSPADM

## 2020-09-07 RX ORDER — QUETIAPINE FUMARATE 50 MG/1
TABLET, FILM COATED ORAL
COMMUNITY
Start: 2020-07-05 | End: 2020-12-01

## 2020-09-07 RX ORDER — MONTELUKAST SODIUM 10 MG/1
10 TABLET ORAL
Status: DISCONTINUED | OUTPATIENT
Start: 2020-09-07 | End: 2020-09-09 | Stop reason: HOSPADM

## 2020-09-07 RX ORDER — ATORVASTATIN CALCIUM 20 MG/1
20 TABLET, FILM COATED ORAL
Status: DISCONTINUED | OUTPATIENT
Start: 2020-09-07 | End: 2020-09-07 | Stop reason: HOSPADM

## 2020-09-07 RX ORDER — BUDESONIDE 0.5 MG/2ML
500 INHALANT ORAL
Status: DISCONTINUED | OUTPATIENT
Start: 2020-09-07 | End: 2020-09-08

## 2020-09-07 RX ORDER — ONDANSETRON 2 MG/ML
4 INJECTION INTRAMUSCULAR; INTRAVENOUS
Status: DISCONTINUED | OUTPATIENT
Start: 2020-09-07 | End: 2020-09-09 | Stop reason: HOSPADM

## 2020-09-07 RX ORDER — INSULIN LISPRO 100 [IU]/ML
INJECTION, SOLUTION INTRAVENOUS; SUBCUTANEOUS EVERY 6 HOURS
Status: DISCONTINUED | OUTPATIENT
Start: 2020-09-08 | End: 2020-09-09 | Stop reason: HOSPADM

## 2020-09-07 RX ORDER — INSULIN LISPRO 100 [IU]/ML
INJECTION, SOLUTION INTRAVENOUS; SUBCUTANEOUS
Status: DISCONTINUED | OUTPATIENT
Start: 2020-09-07 | End: 2020-09-07 | Stop reason: HOSPADM

## 2020-09-07 RX ORDER — PROCHLORPERAZINE EDISYLATE 5 MG/ML
5 INJECTION INTRAMUSCULAR; INTRAVENOUS
Status: CANCELLED | OUTPATIENT
Start: 2020-09-07

## 2020-09-07 RX ORDER — HYDROCHLOROTHIAZIDE 25 MG/1
25 TABLET ORAL DAILY
Status: DISCONTINUED | OUTPATIENT
Start: 2020-09-08 | End: 2020-09-08

## 2020-09-07 RX ORDER — FUROSEMIDE 20 MG/1
20 TABLET ORAL DAILY
Status: DISCONTINUED | OUTPATIENT
Start: 2020-09-08 | End: 2020-09-09 | Stop reason: HOSPADM

## 2020-09-07 RX ORDER — ARFORMOTEROL TARTRATE 15 UG/2ML
15 SOLUTION RESPIRATORY (INHALATION)
Status: DISCONTINUED | OUTPATIENT
Start: 2020-09-07 | End: 2020-09-08

## 2020-09-07 RX ORDER — ACETAMINOPHEN 500 MG
1000 TABLET ORAL ONCE
Status: DISPENSED | OUTPATIENT
Start: 2020-09-07 | End: 2020-09-08

## 2020-09-07 RX ORDER — ISOSORBIDE MONONITRATE 30 MG/1
30 TABLET, EXTENDED RELEASE ORAL DAILY
Status: DISCONTINUED | OUTPATIENT
Start: 2020-09-08 | End: 2020-09-09 | Stop reason: HOSPADM

## 2020-09-07 RX ORDER — ARFORMOTEROL TARTRATE 15 UG/2ML
15 SOLUTION RESPIRATORY (INHALATION)
Status: DISCONTINUED | OUTPATIENT
Start: 2020-09-07 | End: 2020-09-07 | Stop reason: HOSPADM

## 2020-09-07 RX ORDER — MELATONIN
2000 DAILY
Status: DISCONTINUED | OUTPATIENT
Start: 2020-09-08 | End: 2020-09-09 | Stop reason: HOSPADM

## 2020-09-07 RX ORDER — ACETAMINOPHEN 650 MG/1
650 SUPPOSITORY RECTAL
Status: DISCONTINUED | OUTPATIENT
Start: 2020-09-07 | End: 2020-09-09 | Stop reason: HOSPADM

## 2020-09-07 RX ORDER — AMLODIPINE BESYLATE 10 MG/1
10 TABLET ORAL DAILY
Status: DISCONTINUED | OUTPATIENT
Start: 2020-09-08 | End: 2020-09-07 | Stop reason: HOSPADM

## 2020-09-07 RX ORDER — ASPIRIN 81 MG/1
81 TABLET ORAL DAILY
Status: DISCONTINUED | OUTPATIENT
Start: 2020-09-08 | End: 2020-09-09 | Stop reason: HOSPADM

## 2020-09-07 RX ORDER — MONTELUKAST SODIUM 10 MG/1
10 TABLET ORAL
Status: DISCONTINUED | OUTPATIENT
Start: 2020-09-07 | End: 2020-09-07 | Stop reason: HOSPADM

## 2020-09-07 RX ORDER — IPRATROPIUM BROMIDE AND ALBUTEROL SULFATE 2.5; .5 MG/3ML; MG/3ML
3 SOLUTION RESPIRATORY (INHALATION)
Status: DISCONTINUED | OUTPATIENT
Start: 2020-09-07 | End: 2020-09-07 | Stop reason: HOSPADM

## 2020-09-07 RX ORDER — ENOXAPARIN SODIUM 100 MG/ML
40 INJECTION SUBCUTANEOUS DAILY
Status: DISCONTINUED | OUTPATIENT
Start: 2020-09-08 | End: 2020-09-09 | Stop reason: HOSPADM

## 2020-09-07 RX ORDER — SUCRALFATE 1 G/10ML
1000 SUSPENSION ORAL
COMMUNITY
Start: 2018-12-17 | End: 2020-12-01

## 2020-09-07 RX ORDER — ENOXAPARIN SODIUM 100 MG/ML
40 INJECTION SUBCUTANEOUS EVERY 24 HOURS
Status: CANCELLED | OUTPATIENT
Start: 2020-09-07

## 2020-09-07 RX ORDER — SODIUM CHLORIDE 0.9 % (FLUSH) 0.9 %
5-40 SYRINGE (ML) INJECTION AS NEEDED
Status: CANCELLED | OUTPATIENT
Start: 2020-09-07

## 2020-09-07 RX ORDER — ATORVASTATIN CALCIUM 20 MG/1
20 TABLET, FILM COATED ORAL
Status: DISCONTINUED | OUTPATIENT
Start: 2020-09-07 | End: 2020-09-09 | Stop reason: HOSPADM

## 2020-09-07 RX ORDER — ACETAMINOPHEN 325 MG/1
650 TABLET ORAL
Status: DISCONTINUED | OUTPATIENT
Start: 2020-09-07 | End: 2020-09-09 | Stop reason: HOSPADM

## 2020-09-07 RX ADMIN — ALBUTEROL SULFATE 2.5 MG: 2.5 SOLUTION RESPIRATORY (INHALATION) at 11:44

## 2020-09-07 RX ADMIN — AZITHROMYCIN MONOHYDRATE 500 MG: 500 INJECTION, POWDER, LYOPHILIZED, FOR SOLUTION INTRAVENOUS at 21:06

## 2020-09-07 RX ADMIN — MONTELUKAST 10 MG: 10 TABLET, FILM COATED ORAL at 23:14

## 2020-09-07 RX ADMIN — METHYLPREDNISOLONE SODIUM SUCCINATE 125 MG: 125 INJECTION, POWDER, FOR SOLUTION INTRAMUSCULAR; INTRAVENOUS at 11:07

## 2020-09-07 RX ADMIN — Medication 10 ML: at 22:00

## 2020-09-07 RX ADMIN — IPRATROPIUM BROMIDE AND ALBUTEROL SULFATE 3 ML: .5; 3 SOLUTION RESPIRATORY (INHALATION) at 21:33

## 2020-09-07 RX ADMIN — IPRATROPIUM BROMIDE AND ALBUTEROL SULFATE 3 ML: .5; 3 SOLUTION RESPIRATORY (INHALATION) at 14:39

## 2020-09-07 RX ADMIN — ALBUTEROL SULFATE 2.5 MG: 2.5 SOLUTION RESPIRATORY (INHALATION) at 11:04

## 2020-09-07 RX ADMIN — METHYLPREDNISOLONE SODIUM SUCCINATE 60 MG: 40 INJECTION, POWDER, FOR SOLUTION INTRAMUSCULAR; INTRAVENOUS at 23:13

## 2020-09-07 RX ADMIN — ARFORMOTEROL TARTRATE 15 MCG: 15 SOLUTION RESPIRATORY (INHALATION) at 23:13

## 2020-09-07 RX ADMIN — IPRATROPIUM BROMIDE AND ALBUTEROL SULFATE 3 ML: .5; 3 SOLUTION RESPIRATORY (INHALATION) at 06:24

## 2020-09-07 RX ADMIN — AZITHROMYCIN MONOHYDRATE 500 MG: 500 INJECTION, POWDER, LYOPHILIZED, FOR SOLUTION INTRAVENOUS at 06:55

## 2020-09-07 RX ADMIN — BUDESONIDE 1000 MCG: 0.5 SUSPENSION RESPIRATORY (INHALATION) at 14:46

## 2020-09-07 RX ADMIN — BUDESONIDE 500 MCG: 0.5 SUSPENSION RESPIRATORY (INHALATION) at 23:13

## 2020-09-07 RX ADMIN — METHYLPREDNISOLONE SODIUM SUCCINATE 125 MG: 125 INJECTION, POWDER, FOR SOLUTION INTRAMUSCULAR; INTRAVENOUS at 21:33

## 2020-09-07 NOTE — H&P
History & Physical    Patient: Pascual Mcneill MRN: [id number]  CSN: [id number]    YOB: 1965  Age: 63 y.o. Sex: female      DOA: 9/7/2020  CC: worsening cough and shortness of breath    PCP: Emily Hahn NP       HPI:     Pascaul Mcneill is a 63 y.o. female with medical co-morbidities including HTN, CAD, chronic systolic CHF, severe COPD, severe persistent asthma, chronic hypoxic respiratory failure, MY on CPAP, GERD, CKD3, hyperlipidemia, h/o polysubstance abuse, presented with worsening cough and shortness of breath. Apparently, she ran out of most of her medications including respiratory and cardiac medications in the last weeks, and has developed cough and more shortness of breath on the last few days. She denies fever or recent sick contact. She stated that \"this thing\" happens every year and she was seen in the ER yesterday where she was discharged with steroid and oral antibiotics. She has not obtained this medications from pharmacy. She has chest pain with deep breathing. She has not use her CPAP due to concern for mold in her machine. She remains on 2-3 liters oxygen at baseline. No leg swelling. In the ER, she was found with shortness of breath and using accessory muscles. She was found to have worsening hypoxia on exertion. No fever. CXR without infiltrate. No evidence of overt heart failure. She was given solumedrol and azithromycin and neb treatment. COVID-19 test was sent. Review of Systems  GENERAL: No fever, No chill, No malaise   HEENT: No change in vision, no ear ache, tinnitus, +sinus congestion. NECK: No pain or stiffness. PULMONARY: ++ shortness of breath, ++ cough or wheeze. Cardiovascular: no pnd / orthopnea, no Chest Pain  GASTROINTESTINAL: No abd pain, No nausea/vomiting, No diarrhea, No bright red blood per rectum. GENITOURINARY: No urinary frequency, No urgency or pain with urination.    MUSCULOSKELETAL: + joint or muscle pain, no back pain, no recent trauma. DERMATOLOGIC: No rash, no itching, no lesions. ENDOCRINE: No polyuria, polydipsia, No recent change in weight. HEMATOLOGICAL: No easy bruising or bleeding. NEUROLOGIC: No headache, No seizures, No generalized weakness         Past Medical History:   Diagnosis Date    CHF (congestive heart failure) (HCC)     Chronic respiratory failure with hypoxia ([address] 9/7/2020    CKD (chronic kidney disease) stage 3, GFR 30-59 ml/min ([address] 10/31/2019    Cocaine abuse ([address] 11/26/2017    COPD (chronic obstructive pulmonary disease) with chronic bronchitis ([address] 12/19/2017    Dependence on supplemental oxygen     Depression 3/22/2020    Drug-seeking behavior 11/19/2016    Endocrine disease     thyroid issues    Essential hypertension 3/10/2017    Fe deficiency anemia 10/27/2012    Fibromyalgia 12/16/2016    Gastroesophageal reflux disease without esophagitis 10/10/2016    Gastrointestinal disorder     \"blockage in my stomach\"    Hypercholesterolemia     Hypertension     Hypertensive heart failure ([address] 12/19/2017    Morbid obesity due to excess calories ([address] 3/10/2017    NSTEMI (non-ST elevated myocardial infarction) ([address] 3/22/2020    On home O2 12/3/2015    Overview:  2L at home per pt for approx 8 years    Polysubstance abuse ([address] 9/15/2018    Respiratory failure ([address] 1/11/2017    S/P hernia repair 10/31/2019    Sleep apnea 12/19/2017    T wave inversion in EKG 3/9/2019    Tobacco use 0/88/6278    Uncomplicated severe persistent asthma 12/13/2016    Vocal cord disease 12/7/2016       Past Surgical History:   Procedure Laterality Date    HX GI      Exploratory laparotomy with lysis of adhesions and primary repair of incarcerated umbilical hernia       History reviewed. No pertinent family history.     Social History     Socioeconomic History    Marital status: SINGLE     Spouse name: Not on file    Number of children: Not on file    Years of education: Not on file    Highest education level: Not on file   Tobacco Use    Smoking status: Current Every Day Smoker     Packs/day: 0.25    Smokeless tobacco: Current User   Substance and Sexual Activity    Alcohol use: No     Comment: socially    Drug use: Not Currently     Types: Heroin    Sexual activity: Not Currently     Comment: last used 9 years ago       Prior to Admission medications    Medication Sig Start Date End Date Taking? Authorizing Provider   citalopram (CeleXA) 20 mg tablet 1 tablet 12/17/18  Yes Provider, Historical   gemfibroziL (LOPID) 600 mg tablet 1 tablet 7/24/19  Yes Provider, Historical   hydroCHLOROthiazide (HYDRODIURIL) 25 mg tablet 1 tablet in the morning   Yes Provider, Historical   hydrOXYzine HCL (ATARAX) 50 mg tablet 1 tablet as needed   Yes Provider, Historical   isosorbide mononitrate ER (IMDUR) 30 mg tablet Take 30 mg by mouth daily. 8/8/20  Yes Provider, Historical   lisinopriL (PRINIVIL, ZESTRIL) 20 mg tablet 1 tablet 2/8/19  Yes Provider, Historical   sucralfate (CARAFATE) 100 mg/mL suspension Take 1,000 mg by mouth. 12/17/18  Yes Provider, Historical   QUEtiapine (SEROquel) 50 mg tablet TAKE 1 TABLET BY MOUTH AT BEDTIME FOR MOOD 7/5/20  Yes Provider, Historical   albuterol-ipratropium (DUO-NEB) 2.5 mg-0.5 mg/3 ml nebu 3 mL by Nebulization route every four (4) hours as needed for Wheezing. 9/6/20  Yes Tony Desai MD   albuterol sulfate 90 mcg/actuation aebs Take 2 Puffs by inhalation every four to six (4-6) hours as needed for Wheezing. 7/28/20  Yes Paty Drew MD   furosemide (Lasix) 20 mg tablet Take 1 Tab by mouth daily. 7/20/20  Yes Ad Riggs MD   cloNIDine HCL (CATAPRES) 0.2 mg tablet Take 1 Tab by mouth two (2) times a day. 6/22/20  Yes Nick Zarate MD   amLODIPine (NORVASC) 10 mg tablet Take 1 Tab by mouth daily. 6/22/20  Yes Nick Zarate MD   ALPRAZolam (Xanax) 0.5 mg tablet Take 1 Tab by mouth every eight (8) hours as needed for Anxiety.  Max Daily Amount: 1.5 mg. 6/16/20  Yes Gracie Sow MD   aspirin delayed-release 81 mg tablet Take 1 Tab by mouth daily. 3/27/20  Yes Ruddy Moise MD   famotidine (PEPCID) 20 mg tablet Take 1 Tab by mouth daily. 3/26/20  Yes Ruddy Moise MD   OXYGEN-AIR DELIVERY SYSTEMS 3 L by IntraNASal route as needed for Other (sob). Yes Provider, Historical   docusate sodium (COLACE) 100 mg capsule Take 1 Cap by mouth two (2) times a day. 10/17/19  Yes Narciso La MD   arformoterol (BROVANA) 15 mcg/2 mL nebu neb solution 2 mL by Nebulization route two (2) times a day. 3/14/19  Yes Paul Pruett MD   budesonide (PULMICORT) 0.5 mg/2 mL nbsp 2 mL by Nebulization route two (2) times a day. 3/14/19  Yes Paul Pruett MD   tiotropium (SPIRIVA) 18 mcg inhalation capsule Take 1 Cap by inhalation daily. 3/14/19  Yes Paul Pruett MD   atorvastatin (LIPITOR) 20 mg tablet Take 1 Tab by mouth nightly. 3/14/19  Yes Paul Pruett MD   metoprolol tartrate (LOPRESSOR) 25 mg tablet Take 0.5 Tabs by mouth every twelve (12) hours. 3/14/19  Yes Paul Pruett MD   roflumilast (DALIRESP) 500 mcg tab tablet Take 1 Tab by mouth daily. 3/15/19  Yes Paul Pruett MD   naloxone [facility]) 4 mg/actuation nasal spray Use 1 spray intranasally, then discard. Repeat with new spray every 2 min as needed for opioid overdose symptoms, alternating nostrils. 3/14/19  Yes Paul Pruett MD   montelukast (SINGULAIR) 10 mg tablet Take 1 Tab by mouth nightly. 3/9/17  Yes Milton Noel MD   predniSONE (DELTASONE) 50 mg tablet Take 1 Tab by mouth daily for 5 days. 9/6/20 9/11/20  Gabby Brownlee MD   doxycycline (VIBRA-TABS) 100 mg tablet Take 1 Tab by mouth two (2) times a day for 7 days. 9/6/20 9/13/20  Gabby Brownlee MD   fluticasone furoate-vilanterol (BREO ELLIPTA) 200-25 mcg/dose inhaler Take 1 Puff by inhalation daily.     Other, MD Shalini       No Known Allergies           Physical Exam:      Visit Vitals  /78   Pulse (!) 103   Temp 98 °F (36.7 °C)   Resp 21   SpO2 100%       Physical Exam:  Tele: sinus tachycardia  General:  Cooperative, Not in acute distress, speaks in short sentence while in bed  HEENT: PERRL, EOMI, supple neck, no JVD, dry oral mucosa  Cardiovascular: S1S2 tachycardia with exertion, no rub/gallop   Pulmonary: air entry bilaterally, ++end expiratory wheezing, no crackle  GI:  Soft, non tender, non distended, +bs, no guarding   Extremities:  trace pedal edema, +distal pulses appreciated   Neuro: AOx3, moving all extremities, no gross deficit. Lab/Data Review:  Labs: Results:       Chemistry Recent Labs     09/07/20  0645 09/06/20  0150   * 138*    143   K 3.5 2.8*    114*   CO2 27 24   BUN 12 20*   CREA 0.86 1.20   CA 8.7 8.1*   AGAP 5 5   BUCR 14 17   AP  --  85   TP  --  6.9   ALB  --  3.4   GLOB  --  3.5   AGRAT  --  1.0      CBC w/Diff Recent Labs     09/07/20  0645 09/06/20  0150   WBC 9.9 8.9   RBC 3.72* 4.02*   HGB 10.8* 11.7*   HCT 32.6* 35.6    168   GRANS 77* 81*   LYMPH 18* 13*   EOS 1 2      Coagulation No results for input(s): PTP, INR, APTT, INREXT, INREXT in the last 72 hours. Iron/Ferritin No results for input(s): IRON in the last 72 hours. No lab exists for component: TIBCCALC   BNP No results for input(s): BNPP in the last 72 hours. Cardiac Enzymes Recent Labs     09/07/20  1005 09/07/20  0645   CPK 68 70   CKND1 3.2 2.7      Liver Enzymes Recent Labs     09/06/20  0150   TP 6.9   ALB 3.4   AP 85      Thyroid Studies Lab Results   Component Value Date/Time    TSH 0.18 (L) 09/27/2019 10:50 PM          All Micro Results     None          Imaging Reviewed:  XR Results (most recent):  Results from Hospital Encounter encounter on 09/07/20   XR CHEST PORT    Narrative PORTABLE CHEST X-RAY    CPT CODE: 12518     INDICATION: Chest pain. Shortness of breath. .    COMPARISON: Prior exam 9/6/2020. FINDINGS:   Heart size borderline enlarged, similar. Aortic arch not enlarged. Similar  central pulmonary vascular engorgement. Since the prior exam there has been slight interval improvement in degree of  hazy bilateral perihilar opacities which suggests improving edema. No pneumothorax appreciated. No overt pulmonary edema. No focal infiltrate or consolidation. No significant effusion on portable AP  image. Impression IMPRESSION:  1. No evidence of acute cardiopulmonary disease. Improved pulmonary edema. EKG Results     Procedure 720 Value Units Date/Time    EKG, 12 LEAD, SUBSEQUENT [[id number]] Collected:  09/07/20 0901    Order Status:  Completed Updated:  09/07/20 1355     Ventricular Rate 83 BPM      Atrial Rate 83 BPM      P-R Interval 138 ms      QRS Duration 140 ms      Q-T Interval 450 ms      QTC Calculation (Bezet) 528 ms      Calculated P Axis 90 degrees      Calculated R Axis -23 degrees      Calculated T Axis 70 degrees      Diagnosis --     Normal sinus rhythm  Possible Left atrial enlargement  Nonspecific intraventricular block  Cannot rule out Anteroseptal infarct (cited on or before 07-SEP-2020)  Abnormal ECG  When compared with ECG of 07-SEP-2020 06:37,  Significant changes have occurred  Confirmed by Jun Holden MD, Jasmina Prakash ([id number]) on 9/7/2020 1:55:37 PM      EKG, 12 LEAD, SUBSEQUENT [[id number]] Collected:  09/07/20 0637    Order Status:  Completed Updated:  09/07/20 1350     Ventricular Rate 73 BPM      Atrial Rate 73 BPM      P-R Interval 134 ms      QRS Duration 92 ms      Q-T Interval 436 ms      QTC Calculation (Bezet) 480 ms      Calculated P Axis 79 degrees      Calculated R Axis 56 degrees      Calculated T Axis 44 degrees      Diagnosis --     Sinus rhythm with occasional premature ventricular complexes  Possible Left atrial enlargement  Cannot exclude anterior MI versus lead placement issue.   Abnormal ECG  When compared with ECG of 06-SEP-2020 01:48,  premature ventricular complexes are now present  Nonspecific T wave abnormality no longer evident in Lateral leads  QT has lengthened  Confirmed by Maria Luisa Chowdhury MD, Paige Mcbride ([id number]) on 9/7/2020 1:50:29 PM                Assessment:   Active Problems:  1. Acute exacerbation of COPD with asthma  2. Chronic respiratory failure with hypoxia  3. Acute bronchitis   4. Suspected COVID-19 virus infection   5. Tachycardia   6. Hypertension   7. CAD with chronic systolic CHF, stable   8. Hyperlipidemia   9. MY   10. Normocytic anemia, possible iron deficiency anemia   11. GERD   12.  H/o polysubstance abuse   13. Depression/anxiety     Plan:     Admit to telemetry with continue IV solumedrol, duoneb, budesonide, arformoterol neb. Continue with daily azithromycin for now. Droplet precaution, follow up on COVID-19 test.   Hold off her CPAP use for now.  She declined ABG again   Resume her Metoprolol, lisinopril, amlodipine, lasix,   Resume her Singulair, Daliresp   Pepcid and ISS  Check iron profile tomorrow   Check daily lab, LDH, ferritin, CRP, procalc, d-dimer   Hold off her SSRI for now while on azithromycin, can have her hydroxyzine prn   She needs education on cessation of substance abuse  Educated on smoking cessation, can have nicotine patch if she asked for it       Risk of deterioration:  []Low    [x]Moderate  []High     Prophylaxis:  [x]Lovenox  []Coumadin  []Hep SQ  []SCDs  [x]H2B/PPI     Disposition:  []Home w/ Family   [x]HH PT,OT,RN   []SNF/LTC   []SAH/Rehab     Discussed Code Status:         [x]Full Code      []DNR         ___________________________________________________     Care Plan discussed with:    [x]Patient   []Family    []ED Care Manager  [x]ED Doc   [x]Specialist :  Total Time Coordinating Admission:  70    minutes    []Total Critical Care Time:       Donnita Goldmann, MD  9/7/2020, 1:49 PM

## 2020-09-07 NOTE — ED TRIAGE NOTES
Pt arrived by POV from home. Pt had signed out AMA earlier today for SOB with wheezing. Pt was supposed to be admitted but she left to tend to personal matters at home and returned to complete admission. nursing supervisor was contacted on behalf of the pt and requires new admission due to length of time out of the facility. Pt resting and otherwise stable at this time.

## 2020-09-07 NOTE — ED NOTES
Pt adamant about leaving the ED to go home and \"lock my door\". Encouraged pt to stay and continue treatment, however, pt states, \"I'm going to lock my door and will be right back\". Moderate SOB noted, Lurdes Ly at bedside, consulting pt regarding risks of leaving and benefits of staying for further treatment. Pt remains adamant about leaving, verbalized understanding of risks and benefits. AMA paperwork signed by pt. Pt escorted of to the waiting room via wheelchair, in stable condition.

## 2020-09-07 NOTE — ED PROVIDER NOTES
EMERGENCY DEPARTMENT HISTORY AND PHYSICAL EXAM    8:31 AM      Date: 9/7/2020  Patient Name: Brittney El    History of Presenting Illness     Chief Complaint   Patient presents with    Shortness of Breath         History Provided By: Patient  Location/Duration/Severity/Modifying factors   Patient has a 68-year-old female with a history of COPD, asthma, hypertension, congestive heart failure, dyslipidemia, and smoking the presents emergency department with a complaint of increasing shortness of breath the last 2 days. Patient know she is been in the emergency department and notes that the shortness of breath is improving. She is having yellow productive cough and denies any swelling is compliant with all her medications. Patient denies any sick contacts or history of COVID-19. Patient denies any difficulty lying flat or difficulty ambulating. Patient has had some improvement with nebulizer treatments in the emergency department. Patient denies any fevers, chills, or taste or smell changes. Patient denies any other aggravating or alleviating factors. Patient denies any active drug use. Patient denies working at this time. PCP: Alberto Lind NP    Current Outpatient Medications   Medication Sig Dispense Refill    albuterol-ipratropium (DUO-NEB) 2.5 mg-0.5 mg/3 ml nebu 3 mL by Nebulization route every four (4) hours as needed for Wheezing. 30 Nebule 0    predniSONE (DELTASONE) 50 mg tablet Take 1 Tab by mouth daily for 5 days. 5 Tab 0    doxycycline (VIBRA-TABS) 100 mg tablet Take 1 Tab by mouth two (2) times a day for 7 days. 14 Tab 0    albuterol sulfate 90 mcg/actuation aebs Take 2 Puffs by inhalation every four to six (4-6) hours as needed for Wheezing. 1 Inhaler 0    furosemide (Lasix) 20 mg tablet Take 1 Tab by mouth daily. 5 Tab 0    hydrOXYzine pamoate (VistariL) 50 mg capsule Take 1 Cap by mouth three (3) times daily as needed for Anxiety for up to 10 doses.  10 Cap 0    cloNIDine HCL (CATAPRES) 0.2 mg tablet Take 1 Tab by mouth two (2) times a day. 30 Tab 1    amLODIPine (NORVASC) 10 mg tablet Take 1 Tab by mouth daily. 15 Tab 0    ALPRAZolam (Xanax) 0.5 mg tablet Take 1 Tab by mouth every eight (8) hours as needed for Anxiety. Max Daily Amount: 1.5 mg. 20 Tab 0    aspirin delayed-release 81 mg tablet Take 1 Tab by mouth daily. 30 Tab 0    ondansetron (ZOFRAN ODT) 4 mg disintegrating tablet Take 1 Tab by mouth every eight (8) hours as needed for Nausea. Indications: prevent nausea and vomiting after surgery 20 Tab 0    famotidine (PEPCID) 20 mg tablet Take 1 Tab by mouth daily. 30 Tab 1    OXYGEN-AIR DELIVERY SYSTEMS 3 L by IntraNASal route as needed for Other (sob).  docusate sodium (COLACE) 100 mg capsule Take 1 Cap by mouth two (2) times a day. 120 Cap 0    fluticasone furoate-vilanterol (BREO ELLIPTA) 200-25 mcg/dose inhaler Take 1 Puff by inhalation daily.  arformoterol (BROVANA) 15 mcg/2 mL nebu neb solution 2 mL by Nebulization route two (2) times a day. 60 Vial 0    budesonide (PULMICORT) 0.5 mg/2 mL nbsp 2 mL by Nebulization route two (2) times a day. 60 Each 0    tiotropium (SPIRIVA) 18 mcg inhalation capsule Take 1 Cap by inhalation daily. 30 Cap 0    atorvastatin (LIPITOR) 20 mg tablet Take 1 Tab by mouth nightly. 30 Tab 0    escitalopram oxalate (LEXAPRO) 10 mg tablet Take 1 Tab by mouth every evening. 30 Tab 0    metoprolol tartrate (LOPRESSOR) 25 mg tablet Take 0.5 Tabs by mouth every twelve (12) hours. 30 Tab 0    roflumilast (DALIRESP) 500 mcg tab tablet Take 1 Tab by mouth daily. 30 Tab 0    OTHER Incentive spirometry- use as directed 1 Each 0    naloxone (NARCAN) 4 mg/actuation nasal spray Use 1 spray intranasally, then discard. Repeat with new spray every 2 min as needed for opioid overdose symptoms, alternating nostrils. 1 Each 0    montelukast (SINGULAIR) 10 mg tablet Take 1 Tab by mouth nightly.  30 Tab 1       Past History     Past Medical History:  Past Medical History:   Diagnosis Date    Asthma     CHF (congestive heart failure) (HCC)     Chronic obstructive pulmonary disease (HCC)     Dependence on supplemental oxygen     Endocrine disease     thyroid issues    Gastrointestinal disorder     \"blockage in my stomach\"    Hypercholesterolemia     Hypertension        Past Surgical History:  Past Surgical History:   Procedure Laterality Date    HX GI      Exploratory laparotomy with lysis of adhesions and primary repair of incarcerated umbilical hernia       Family History:  History reviewed. No pertinent family history. Social History:  Social History     Tobacco Use    Smoking status: Current Every Day Smoker     Packs/day: 0.25    Smokeless tobacco: Current User   Substance Use Topics    Alcohol use: No     Comment: socially    Drug use: Not Currently     Types: Heroin       Allergies:  No Known Allergies      Review of Systems       Review of Systems   Constitutional: Negative for activity change, fatigue and fever. HENT: Negative for congestion and rhinorrhea. Eyes: Negative for visual disturbance. Respiratory: Positive for cough, shortness of breath and wheezing. Cardiovascular: Negative for chest pain and palpitations. Gastrointestinal: Negative for abdominal pain, diarrhea, nausea and vomiting. Genitourinary: Negative for dysuria and hematuria. Musculoskeletal: Negative for back pain. Skin: Negative for rash. Neurological: Negative for dizziness, weakness and light-headedness. All other systems reviewed and are negative. Physical Exam     Visit Vitals  /55   Pulse 76   Temp 98 °F (36.7 °C)   Resp 17   LMP 04/14/2009   SpO2 100%         Physical Exam  Vitals signs and nursing note reviewed. Constitutional:       General: She is not in acute distress. Appearance: She is well-developed. Comments: Mild conversational dyspnea   HENT:      Head: Normocephalic and atraumatic.       Right Ear: External ear normal.      Left Ear: External ear normal.      Nose: Nose normal.   Eyes:      General: No scleral icterus. Conjunctiva/sclera: Conjunctivae normal.      Pupils: Pupils are equal, round, and reactive to light. Neck:      Musculoskeletal: Normal range of motion and neck supple. Thyroid: No thyromegaly. Vascular: No JVD. Trachea: No tracheal deviation. Cardiovascular:      Rate and Rhythm: Normal rate and regular rhythm. Heart sounds: Normal heart sounds. No murmur. No friction rub. No gallop. Pulmonary:      Effort: Pulmonary effort is normal.      Breath sounds: Wheezing and rhonchi present. Chest:      Chest wall: No tenderness. Abdominal:      General: Bowel sounds are normal. There is no distension. Palpations: Abdomen is soft. Tenderness: There is no abdominal tenderness. There is no guarding or rebound. Musculoskeletal: Normal range of motion. General: No tenderness. Lymphadenopathy:      Cervical: No cervical adenopathy. Skin:     General: Skin is warm and dry. Neurological:      Mental Status: She is alert and oriented to person, place, and time. Cranial Nerves: No cranial nerve deficit. Coordination: Coordination normal.      Comments: No sensory loss, Gait normal, Motor 5/5   Psychiatric:         Behavior: Behavior normal.         Thought Content:  Thought content normal.         Judgment: Judgment normal.           Diagnostic Study Results     Labs -  Recent Results (from the past 12 hour(s))   EKG, 12 LEAD, SUBSEQUENT    Collection Time: 09/07/20  6:37 AM   Result Value Ref Range    Ventricular Rate 73 BPM    Atrial Rate 73 BPM    P-R Interval 134 ms    QRS Duration 92 ms    Q-T Interval 436 ms    QTC Calculation (Bezet) 480 ms    Calculated P Axis 79 degrees    Calculated R Axis 56 degrees    Calculated T Axis 44 degrees    Diagnosis       Sinus rhythm with occasional premature ventricular complexes  Possible Left atrial enlargement  Possible Anterior infarct , age undetermined  Abnormal ECG  When compared with ECG of 06-SEP-2020 01:48,  premature ventricular complexes are now present  Nonspecific T wave abnormality no longer evident in Lateral leads  QT has lengthened     CBC WITH AUTOMATED DIFF    Collection Time: 09/07/20  6:45 AM   Result Value Ref Range    WBC 9.9 4.6 - 13.2 K/uL    RBC 3.72 (L) 4.20 - 5.30 M/uL    HGB 10.8 (L) 12.0 - 16.0 g/dL    HCT 32.6 (L) 35.0 - 45.0 %    MCV 87.6 74.0 - 97.0 FL    MCH 29.0 24.0 - 34.0 PG    MCHC 33.1 31.0 - 37.0 g/dL    RDW 13.7 11.6 - 14.5 %    PLATELET 686 713 - 483 K/uL    MPV 10.5 9.2 - 11.8 FL    NEUTROPHILS 77 (H) 40 - 73 %    LYMPHOCYTES 18 (L) 21 - 52 %    MONOCYTES 4 3 - 10 %    EOSINOPHILS 1 0 - 5 %    BASOPHILS 0 0 - 2 %    ABS. NEUTROPHILS 7.6 1.8 - 8.0 K/UL    ABS. LYMPHOCYTES 1.8 0.9 - 3.6 K/UL    ABS. MONOCYTES 0.4 0.05 - 1.2 K/UL    ABS. EOSINOPHILS 0.1 0.0 - 0.4 K/UL    ABS.  BASOPHILS 0.0 0.0 - 0.1 K/UL    DF AUTOMATED     METABOLIC PANEL, BASIC    Collection Time: 09/07/20  6:45 AM   Result Value Ref Range    Sodium 142 136 - 145 mmol/L    Potassium 3.5 3.5 - 5.5 mmol/L    Chloride 110 100 - 111 mmol/L    CO2 27 21 - 32 mmol/L    Anion gap 5 3.0 - 18 mmol/L    Glucose 143 (H) 74 - 99 mg/dL    BUN 12 7.0 - 18 MG/DL    Creatinine 0.86 0.6 - 1.3 MG/DL    BUN/Creatinine ratio 14 12 - 20      GFR est AA >60 >60 ml/min/1.73m2    GFR est non-AA >60 >60 ml/min/1.73m2    Calcium 8.7 8.5 - 10.1 MG/DL   CARDIAC PANEL,(CK, CKMB & TROPONIN)    Collection Time: 09/07/20  6:45 AM   Result Value Ref Range    CK - MB 1.9 <3.6 ng/ml    CK-MB Index 2.7 0.0 - 4.0 %    CK 70 26 - 192 U/L    Troponin-I, QT <0.02 0.0 - 0.045 NG/ML   NT-PRO BNP    Collection Time: 09/07/20  6:45 AM   Result Value Ref Range    NT pro- 0 - 900 PG/ML   EKG, 12 LEAD, SUBSEQUENT    Collection Time: 09/07/20  9:01 AM   Result Value Ref Range    Ventricular Rate 83 BPM    Atrial Rate 83 BPM    P-R Interval 138 ms    QRS Duration 140 ms    Q-T Interval 450 ms    QTC Calculation (Bezet) 528 ms    Calculated P Axis 90 degrees    Calculated R Axis -23 degrees    Calculated T Axis 70 degrees    Diagnosis       Normal sinus rhythm  Possible Left atrial enlargement  Nonspecific intraventricular block  Cannot rule out Anteroseptal infarct (cited on or before 07-SEP-2020)  Abnormal ECG  When compared with ECG of 07-SEP-2020 06:37,  Significant changes have occurred     CARDIAC PANEL,(CK, CKMB & TROPONIN)    Collection Time: 09/07/20 10:05 AM   Result Value Ref Range    CK - MB 2.2 <3.6 ng/ml    CK-MB Index 3.2 0.0 - 4.0 %    CK 68 26 - 192 U/L    Troponin-I, QT <0.02 0.0 - 0.045 NG/ML       Radiologic Studies -   XR CHEST PORT   Final Result   IMPRESSION:   1. No evidence of acute cardiopulmonary disease. Improved pulmonary edema. Cardiomegaly without infiltrate    Medical Decision Making   I am the first provider for this patient. I reviewed the vital signs, available nursing notes, past medical history, past surgical history, family history and social history. Vital Signs-Reviewed the patient's vital signs. EKG: EKG is normal sinus rhythm without STEMI, repeat EKG is left bundle branch block interpreted by me    Records Reviewed: Nursing Notes and Old Medical Records (Time of Review: 8:31 AM)    ED Course: Progress Notes, Reevaluation, and Consults:     Patient has a history of intermittent left bundle branch block and initial cardiac labs are reassuring and had a negative catheterization for epicardial coronary disease in March of this year. Suspect patient is having a COPD exacerbation and will repeat her troponin if the troponin is reassuring discussed the care plan with the patient. In addition the novel coronavirus was sent and will help facilitate either her inpatient or outpatient care. Fred Plascencia, DO 10:17 AM    The patient remains quite short of breath and is retracting at rest.  Patient has a history of COPD and will proceed with admission to the hospital to monitor the patient for her COPD exacerbation. Stephen Didier, DO 12:17 PM    The patient continues to have dyspnea however saying that she wants to leave now and not get admitted. Will ambulate the patient on her normal 3 L of oxygen and see how she does and if she can manage without distress will consider outpatient care. Stephen Didier, DO 12:47 PM    The patient was ambulated with 3 L of oxygen and the patient desatted into the 80s and had to stop frequently. We will proceed with admission. Stephen Didier, DO 1:03 PM            Provider Notes (Medical Decision Making):   MDM  Number of Diagnoses or Management Options  Diagnosis management comments: Patient is a 58-year-old female with a history of hypertension, congestive heart failure, COPD, the presents emergency department complaint of increasing shortness of breath and productive cough. Patient is wheezing and has rhonchi in his responding well to nebulizer treatments and to steroids. Patient's chest x-ray is reassuring and will reevaluate patient after treatments see if further testing is needed and follow her cardiac labs. Patient's blood pressure is reassuring which suggest this may be a COPD exacerbation over hypervolemia. Patient was given IV azithromycin by the overnight team prior to my arrival.Osvaldo Barakat, DO 8:34 AM        Procedures    Critical Care Time: Critical Care Time:  The services I provided to this patient were to treat and/or prevent clinically significant deterioration that could result in the failure of one or more body systems and/or organ systems due to COPD with acute exacerbation and respiratory distress.     Services included the following:  -reviewing nursing notes and old charts  -vital sign assessments  -direct patient care  -medication orders and management  -interpreting and reviewing diagnostic studies/labs  -re-evaluations  -documentation time    Aggregate critical care time was 40 minutes, which includes only time during which I was engaged in work directly related to the patient's care as described above, whether I was at bedside or elsewhere in the Emergency Department. It did not include time spent performing other reported procedures or the services of residents, students, nurses, or advance practice providers. Makayla Horne,  12:20 PM          Diagnosis     Clinical Impression:   1. Acute exacerbation of chronic obstructive pulmonary disease (COPD) ([facility] [address]    2. Acute respiratory distress    3. LBBB (left bundle branch block)        Disposition: Admit     Follow-up Information    None          Patient's Medications   Start Taking    No medications on file   Continue Taking    ALBUTEROL SULFATE 90 MCG/ACTUATION AEBS    Take 2 Puffs by inhalation every four to six (4-6) hours as needed for Wheezing. ALBUTEROL-IPRATROPIUM (DUO-NEB) 2.5 MG-0.5 MG/3 ML NEBU    3 mL by Nebulization route every four (4) hours as needed for Wheezing. ALPRAZOLAM (XANAX) 0.5 MG TABLET    Take 1 Tab by mouth every eight (8) hours as needed for Anxiety. Max Daily Amount: 1.5 mg.    AMLODIPINE (NORVASC) 10 MG TABLET    Take 1 Tab by mouth daily. ARFORMOTEROL (BROVANA) 15 MCG/2 ML NEBU NEB SOLUTION    2 mL by Nebulization route two (2) times a day. ASPIRIN DELAYED-RELEASE 81 MG TABLET    Take 1 Tab by mouth daily. ATORVASTATIN (LIPITOR) 20 MG TABLET    Take 1 Tab by mouth nightly. BUDESONIDE (PULMICORT) 0.5 MG/2 ML NBSP    2 mL by Nebulization route two (2) times a day. CLONIDINE HCL (CATAPRES) 0.2 MG TABLET    Take 1 Tab by mouth two (2) times a day. DOCUSATE SODIUM (COLACE) 100 MG CAPSULE    Take 1 Cap by mouth two (2) times a day. DOXYCYCLINE (VIBRA-TABS) 100 MG TABLET    Take 1 Tab by mouth two (2) times a day for 7 days. ESCITALOPRAM OXALATE (LEXAPRO) 10 MG TABLET    Take 1 Tab by mouth every evening.     FAMOTIDINE (PEPCID) 20 MG TABLET    Take 1 Tab by mouth daily. FLUTICASONE FUROATE-VILANTEROL (BREO ELLIPTA) 200-25 MCG/DOSE INHALER    Take 1 Puff by inhalation daily. FUROSEMIDE (LASIX) 20 MG TABLET    Take 1 Tab by mouth daily. HYDROXYZINE PAMOATE (VISTARIL) 50 MG CAPSULE    Take 1 Cap by mouth three (3) times daily as needed for Anxiety for up to 10 doses. METOPROLOL TARTRATE (LOPRESSOR) 25 MG TABLET    Take 0.5 Tabs by mouth every twelve (12) hours. MONTELUKAST (SINGULAIR) 10 MG TABLET    Take 1 Tab by mouth nightly. NALOXONE (NARCAN) 4 MG/ACTUATION NASAL SPRAY    Use 1 spray intranasally, then discard. Repeat with new spray every 2 min as needed for opioid overdose symptoms, alternating nostrils. ONDANSETRON (ZOFRAN ODT) 4 MG DISINTEGRATING TABLET    Take 1 Tab by mouth every eight (8) hours as needed for Nausea. Indications: prevent nausea and vomiting after surgery    OTHER    Incentive spirometry- use as directed    OXYGEN-AIR DELIVERY SYSTEMS    3 L by IntraNASal route as needed for Other (sob). PREDNISONE (DELTASONE) 50 MG TABLET    Take 1 Tab by mouth daily for 5 days. ROFLUMILAST (DALIRESP) 500 MCG TAB TABLET    Take 1 Tab by mouth daily. TIOTROPIUM (SPIRIVA) 18 MCG INHALATION CAPSULE    Take 1 Cap by inhalation daily. These Medications have changed    No medications on file   Stop Taking    No medications on file     Disclaimer: Sections of this note are dictated using utilizing voice recognition software. Minor typographical errors may be present. If questions arise, please do not hesitate to contact me or call our department.

## 2020-09-08 ENCOUNTER — PATIENT OUTREACH (OUTPATIENT)
Dept: CASE MANAGEMENT | Age: 55
End: 2020-09-08

## 2020-09-08 LAB
ALBUMIN SERPL-MCNC: 3.9 G/DL (ref 3.4–5)
ALBUMIN/GLOB SERPL: 1.1 {RATIO} (ref 0.8–1.7)
ALP SERPL-CCNC: 87 U/L (ref 45–117)
ALT SERPL-CCNC: 23 U/L (ref 13–56)
ANION GAP SERPL CALC-SCNC: 7 MMOL/L (ref 3–18)
AST SERPL-CCNC: 22 U/L (ref 10–38)
ATRIAL RATE: 64 BPM
ATRIAL RATE: 66 BPM
ATRIAL RATE: 78 BPM
BASOPHILS # BLD: 0 K/UL (ref 0–0.1)
BASOPHILS NFR BLD: 0 % (ref 0–2)
BILIRUB DIRECT SERPL-MCNC: 0.1 MG/DL (ref 0–0.2)
BILIRUB SERPL-MCNC: 0.4 MG/DL (ref 0.2–1)
BUN SERPL-MCNC: 19 MG/DL (ref 7–18)
BUN/CREAT SERPL: 20 (ref 12–20)
CALCIUM SERPL-MCNC: 9 MG/DL (ref 8.5–10.1)
CALCULATED P AXIS, ECG09: 75 DEGREES
CALCULATED P AXIS, ECG09: 79 DEGREES
CALCULATED P AXIS, ECG09: 81 DEGREES
CALCULATED R AXIS, ECG10: 69 DEGREES
CALCULATED R AXIS, ECG10: 73 DEGREES
CALCULATED R AXIS, ECG10: 74 DEGREES
CALCULATED T AXIS, ECG11: 43 DEGREES
CALCULATED T AXIS, ECG11: 47 DEGREES
CALCULATED T AXIS, ECG11: 66 DEGREES
CHLORIDE SERPL-SCNC: 105 MMOL/L (ref 100–111)
CK MB CFR SERPL CALC: 2.2 % (ref 0–4)
CK MB SERPL-MCNC: 1.7 NG/ML (ref 5–25)
CK SERPL-CCNC: 76 U/L (ref 26–192)
CO2 SERPL-SCNC: 27 MMOL/L (ref 21–32)
CREAT SERPL-MCNC: 0.94 MG/DL (ref 0.6–1.3)
CRP SERPL-MCNC: 0.8 MG/DL (ref 0–0.3)
D DIMER PPP FEU-MCNC: 0.31 UG/ML(FEU)
DIAGNOSIS, 93000: NORMAL
DIFFERENTIAL METHOD BLD: ABNORMAL
EOSINOPHIL # BLD: 0 K/UL (ref 0–0.4)
EOSINOPHIL NFR BLD: 0 % (ref 0–5)
ERYTHROCYTE [DISTWIDTH] IN BLOOD BY AUTOMATED COUNT: 13.7 % (ref 11.6–14.5)
FERRITIN SERPL-MCNC: 52 NG/ML (ref 8–388)
GLOBULIN SER CALC-MCNC: 3.6 G/DL (ref 2–4)
GLUCOSE BLD STRIP.AUTO-MCNC: 122 MG/DL (ref 70–110)
GLUCOSE SERPL-MCNC: 117 MG/DL (ref 74–99)
HCT VFR BLD AUTO: 39.9 % (ref 35–45)
HGB BLD-MCNC: 13.3 G/DL (ref 12–16)
LDH SERPL L TO P-CCNC: 250 U/L (ref 81–234)
LYMPHOCYTES # BLD: 0.8 K/UL (ref 0.9–3.6)
LYMPHOCYTES NFR BLD: 6 % (ref 21–52)
MCH RBC QN AUTO: 29.6 PG (ref 24–34)
MCHC RBC AUTO-ENTMCNC: 33.3 G/DL (ref 31–37)
MCV RBC AUTO: 88.7 FL (ref 74–97)
MONOCYTES # BLD: 0.1 K/UL (ref 0.05–1.2)
MONOCYTES NFR BLD: 1 % (ref 3–10)
NEUTS SEG # BLD: 12.4 K/UL (ref 1.8–8)
NEUTS SEG NFR BLD: 93 % (ref 40–73)
P-R INTERVAL, ECG05: 130 MS
PLATELET # BLD AUTO: 240 K/UL (ref 135–420)
PMV BLD AUTO: 11 FL (ref 9.2–11.8)
POTASSIUM SERPL-SCNC: 4.9 MMOL/L (ref 3.5–5.5)
PROCALCITONIN SERPL-MCNC: <0.05 NG/ML
PROT SERPL-MCNC: 7.5 G/DL (ref 6.4–8.2)
Q-T INTERVAL, ECG07: 382 MS
Q-T INTERVAL, ECG07: 404 MS
Q-T INTERVAL, ECG07: 430 MS
QRS DURATION, ECG06: 86 MS
QRS DURATION, ECG06: 92 MS
QRS DURATION, ECG06: 92 MS
QTC CALCULATION (BEZET), ECG08: 423 MS
QTC CALCULATION (BEZET), ECG08: 435 MS
QTC CALCULATION (BEZET), ECG08: 443 MS
RBC # BLD AUTO: 4.5 M/UL (ref 4.2–5.3)
SODIUM SERPL-SCNC: 139 MMOL/L (ref 136–145)
TROPONIN I SERPL-MCNC: <0.02 NG/ML (ref 0–0.04)
VENTRICULAR RATE, ECG03: 64 BPM
VENTRICULAR RATE, ECG03: 66 BPM
VENTRICULAR RATE, ECG03: 78 BPM
WBC # BLD AUTO: 13.3 K/UL (ref 4.6–13.2)

## 2020-09-08 PROCEDURE — 85025 COMPLETE CBC W/AUTO DIFF WBC: CPT

## 2020-09-08 PROCEDURE — 74011250636 HC RX REV CODE- 250/636: Performed by: INTERNAL MEDICINE

## 2020-09-08 PROCEDURE — 74011250637 HC RX REV CODE- 250/637: Performed by: EMERGENCY MEDICINE

## 2020-09-08 PROCEDURE — 80076 HEPATIC FUNCTION PANEL: CPT

## 2020-09-08 PROCEDURE — 86140 C-REACTIVE PROTEIN: CPT

## 2020-09-08 PROCEDURE — 82553 CREATINE MB FRACTION: CPT

## 2020-09-08 PROCEDURE — 74011250637 HC RX REV CODE- 250/637: Performed by: INTERNAL MEDICINE

## 2020-09-08 PROCEDURE — 93005 ELECTROCARDIOGRAM TRACING: CPT

## 2020-09-08 PROCEDURE — 83615 LACTATE (LD) (LDH) ENZYME: CPT

## 2020-09-08 PROCEDURE — 82728 ASSAY OF FERRITIN: CPT

## 2020-09-08 PROCEDURE — 94660 CPAP INITIATION&MGMT: CPT

## 2020-09-08 PROCEDURE — 80048 BASIC METABOLIC PNL TOTAL CA: CPT

## 2020-09-08 PROCEDURE — 74011250636 HC RX REV CODE- 250/636: Performed by: NURSE PRACTITIONER

## 2020-09-08 PROCEDURE — 74011250637 HC RX REV CODE- 250/637: Performed by: NURSE PRACTITIONER

## 2020-09-08 PROCEDURE — 82550 ASSAY OF CK (CPK): CPT

## 2020-09-08 PROCEDURE — 84484 ASSAY OF TROPONIN QUANT: CPT

## 2020-09-08 PROCEDURE — 85379 FIBRIN DEGRADATION QUANT: CPT

## 2020-09-08 PROCEDURE — 74011000250 HC RX REV CODE- 250: Performed by: NURSE PRACTITIONER

## 2020-09-08 PROCEDURE — 82962 GLUCOSE BLOOD TEST: CPT

## 2020-09-08 PROCEDURE — 65660000000 HC RM CCU STEPDOWN

## 2020-09-08 PROCEDURE — 84145 PROCALCITONIN (PCT): CPT

## 2020-09-08 RX ORDER — FUROSEMIDE 10 MG/ML
20 INJECTION INTRAMUSCULAR; INTRAVENOUS ONCE
Status: COMPLETED | OUTPATIENT
Start: 2020-09-08 | End: 2020-09-08

## 2020-09-08 RX ORDER — KETOROLAC TROMETHAMINE 15 MG/ML
15 INJECTION, SOLUTION INTRAMUSCULAR; INTRAVENOUS
Status: DISCONTINUED | OUTPATIENT
Start: 2020-09-08 | End: 2020-09-09 | Stop reason: HOSPADM

## 2020-09-08 RX ORDER — METHADONE HYDROCHLORIDE 10 MG/1
10 TABLET ORAL DAILY
Status: DISCONTINUED | OUTPATIENT
Start: 2020-09-08 | End: 2020-09-09 | Stop reason: HOSPADM

## 2020-09-08 RX ORDER — POLYETHYLENE GLYCOL 3350 17 G/17G
17 POWDER, FOR SOLUTION ORAL AS NEEDED
Status: DISCONTINUED | OUTPATIENT
Start: 2020-09-08 | End: 2020-09-09 | Stop reason: HOSPADM

## 2020-09-08 RX ORDER — FACIAL-BODY WIPES
10 EACH TOPICAL DAILY PRN
Status: DISCONTINUED | OUTPATIENT
Start: 2020-09-08 | End: 2020-09-09 | Stop reason: HOSPADM

## 2020-09-08 RX ORDER — DOCUSATE SODIUM 100 MG/1
100 CAPSULE, LIQUID FILLED ORAL 2 TIMES DAILY
Status: DISCONTINUED | OUTPATIENT
Start: 2020-09-08 | End: 2020-09-09 | Stop reason: HOSPADM

## 2020-09-08 RX ORDER — ALBUTEROL SULFATE 90 UG/1
2 AEROSOL, METERED RESPIRATORY (INHALATION)
Status: DISCONTINUED | OUTPATIENT
Start: 2020-09-08 | End: 2020-09-09 | Stop reason: HOSPADM

## 2020-09-08 RX ORDER — LANOLIN ALCOHOL/MO/W.PET/CERES
6 CREAM (GRAM) TOPICAL
Status: DISCONTINUED | OUTPATIENT
Start: 2020-09-08 | End: 2020-09-09 | Stop reason: HOSPADM

## 2020-09-08 RX ADMIN — MONTELUKAST 10 MG: 10 TABLET, FILM COATED ORAL at 20:42

## 2020-09-08 RX ADMIN — POLYETHYLENE GLYCOL 3350 17 G: 17 POWDER, FOR SOLUTION ORAL at 18:49

## 2020-09-08 RX ADMIN — LISINOPRIL 20 MG: 20 TABLET ORAL at 08:32

## 2020-09-08 RX ADMIN — ALBUTEROL SULFATE 2 PUFF: 108 INHALANT RESPIRATORY (INHALATION) at 20:00

## 2020-09-08 RX ADMIN — Medication 500 MG: at 08:33

## 2020-09-08 RX ADMIN — METHYLPREDNISOLONE SODIUM SUCCINATE 40 MG: 40 INJECTION, POWDER, FOR SOLUTION INTRAMUSCULAR; INTRAVENOUS at 08:26

## 2020-09-08 RX ADMIN — AZITHROMYCIN MONOHYDRATE 500 MG: 500 INJECTION, POWDER, LYOPHILIZED, FOR SOLUTION INTRAVENOUS at 21:11

## 2020-09-08 RX ADMIN — DOCUSATE SODIUM 100 MG: 100 CAPSULE, LIQUID FILLED ORAL at 20:41

## 2020-09-08 RX ADMIN — ALBUTEROL SULFATE 2 PUFF: 108 INHALANT RESPIRATORY (INHALATION) at 17:05

## 2020-09-08 RX ADMIN — BUDESONIDE 500 MCG: 0.5 SUSPENSION RESPIRATORY (INHALATION) at 08:35

## 2020-09-08 RX ADMIN — ARFORMOTEROL TARTRATE 15 MCG: 15 SOLUTION RESPIRATORY (INHALATION) at 08:34

## 2020-09-08 RX ADMIN — IPRATROPIUM BROMIDE AND ALBUTEROL SULFATE 3 ML: .5; 3 SOLUTION RESPIRATORY (INHALATION) at 08:26

## 2020-09-08 RX ADMIN — FUROSEMIDE 20 MG: 20 TABLET ORAL at 08:32

## 2020-09-08 RX ADMIN — AMLODIPINE BESYLATE 10 MG: 10 TABLET ORAL at 08:34

## 2020-09-08 RX ADMIN — METOPROLOL TARTRATE 12.5 MG: 25 TABLET, FILM COATED ORAL at 20:41

## 2020-09-08 RX ADMIN — MELATONIN 6 MG: at 01:37

## 2020-09-08 RX ADMIN — Medication 10 ML: at 20:43

## 2020-09-08 RX ADMIN — ASPIRIN 81 MG: 81 TABLET, COATED ORAL at 08:29

## 2020-09-08 RX ADMIN — FUROSEMIDE 20 MG: 10 INJECTION, SOLUTION INTRAMUSCULAR; INTRAVENOUS at 01:37

## 2020-09-08 RX ADMIN — ATORVASTATIN CALCIUM 20 MG: 20 TABLET, FILM COATED ORAL at 20:42

## 2020-09-08 RX ADMIN — ZINC SULFATE 220 MG (50 MG) CAPSULE 1 CAPSULE: CAPSULE at 08:33

## 2020-09-08 RX ADMIN — METHADONE HYDROCHLORIDE 10 MG: 10 TABLET ORAL at 09:49

## 2020-09-08 RX ADMIN — IPRATROPIUM BROMIDE AND ALBUTEROL SULFATE 3 ML: .5; 3 SOLUTION RESPIRATORY (INHALATION) at 00:06

## 2020-09-08 RX ADMIN — METHYLPREDNISOLONE SODIUM SUCCINATE 40 MG: 40 INJECTION, POWDER, FOR SOLUTION INTRAMUSCULAR; INTRAVENOUS at 14:53

## 2020-09-08 RX ADMIN — HYDROXYZINE HYDROCHLORIDE 50 MG: 25 TABLET, FILM COATED ORAL at 18:49

## 2020-09-08 RX ADMIN — Medication 10 ML: at 14:00

## 2020-09-08 RX ADMIN — CHOLECALCIFEROL (VITAMIN D3) 25 MCG (1,000 UNIT) TABLET 2 TABLET: TABLET at 08:29

## 2020-09-08 RX ADMIN — HYDROCHLOROTHIAZIDE 25 MG: 25 TABLET ORAL at 08:31

## 2020-09-08 RX ADMIN — METHYLPREDNISOLONE SODIUM SUCCINATE 40 MG: 40 INJECTION, POWDER, FOR SOLUTION INTRAMUSCULAR; INTRAVENOUS at 20:41

## 2020-09-08 RX ADMIN — FAMOTIDINE 20 MG: 20 TABLET ORAL at 08:34

## 2020-09-08 RX ADMIN — IPRATROPIUM BROMIDE AND ALBUTEROL SULFATE 3 ML: .5; 3 SOLUTION RESPIRATORY (INHALATION) at 04:01

## 2020-09-08 NOTE — ED NOTES
Bedside shift change report given to Abhinav Martin (oncoming nurse) by Trey WAGGONER (offgoing nurse). Report included the following information SBAR, Kardex, ED Summary, MAR, Accordion and Recent Results.

## 2020-09-08 NOTE — CONSULTS
[facility]. Pulmonary, Critical Care, and Sleep Medicine    Initial Patient Consult    Name: Montez Munoz MRN: [id number]   : 1965 Hospital: [address] [place] [address]   Date: 2020        IMPRESSION:   · Acute on Chronic Hypoxic Respiratory Failure  · - 2/2 COPD exacerbation and illicit drug use  · - On 2-3L NC at home, reportedly takes advair  · COPD Exacerbation  · - likely 2/2 continued smoking and drug abuse/noncompliance  · - Follows with pulm at [facility]. · Covid PUI  · Illicit Drug use  · - UDS for cocaine and opiates.       Patient Active Problem List   Diagnosis Code    Abnormal CT of the chest R93.89    Asthma J45.909    Respiratory failure ([address] J96.90    Acute exacerbation of COPD with asthma ([address] J44.1, J45.901    Essential hypertension I10    Morbid obesity due to excess calories ([address] E66.01    Hypertensive heart failure ([address] I11.0    Sleep apnea G47.30    COPD (chronic obstructive pulmonary disease) with chronic bronchitis ([address] J44.9    T wave inversion in EKG R94.31    Tobacco use Z72.0    S/P hernia repair Z98.890, Z87.19    CKD (chronic kidney disease) stage 3, GFR 30-59 ml/min ([facility]) N18.3    Cocaine abuse ([address] F14.10    Drug-seeking behavior Z76.5    Fibromyalgia M79.7    Depression F32.9    Fe deficiency anemia D50.9    Gastroesophageal reflux disease without esophagitis K21.9    HLD (hyperlipidemia) E78.5    Thyroid goiter E04.9    MDRO (multiple drug resistant organisms) resistance Z16.35    On home O2 Z99.81    Polysubstance abuse (HCC) Q80.27    Uncomplicated severe persistent asthma J45.50    Vocal cord disease J38.3    COPD with acute exacerbation (HCC) J44.1    Chronic respiratory failure with hypoxia (HCC) J96.11    Acute bronchitis J20.9    Suspected COVID-19 virus infection Z20.828    Tachycardia R00.0    Normocytic anemia D64.9    Acute respiratory distress R06.03    COPD exacerbation (HCC) J44.1 RECOMMENDATIONS:   · Wean O2 as tolerated  · Very tight on examination- will ask RT to give scheduled nebulizers  · BIPAP at night   · Continue IV steroids  · Await covid19 results- low suspicion  · Do not recommend Antibiotics other than azithromycin  · Aspiration precautions  · Will Follow     Subjective: This patient has been seen and evaluated at the request of Dr. Maria R Jean for COPD. Patient is a 63 y.o. female with PMHx of chronic respiratory failure on chronic O2, severe COPD, opiate dependence and illicit drug use who presented with worsening breathing. She endorses no relief with her inhaler in a couple days. She endorses a dry cough and wheezing. She denies any sick contacts. In the ED she was placed on bipap for work of breathing. She was given steroids, ABX and breathing treatments. CXR was negative. On my examination, she is on NC with some mild distress and tachypnea. She has severe wheezing and poor air movement. Unmonitored so other vitals are unknown.      Past Medical History:   Diagnosis Date    CHF (congestive heart failure) ([facility])     Chronic respiratory failure with hypoxia ([address] 9/7/2020    CKD (chronic kidney disease) stage 3, GFR 30-59 ml/min ([address] 10/31/2019    Cocaine abuse ([address] 11/26/2017    COPD (chronic obstructive pulmonary disease) with chronic bronchitis ([address] 12/19/2017    Dependence on supplemental oxygen     Depression 3/22/2020    Drug-seeking behavior 11/19/2016    Endocrine disease     thyroid issues    Essential hypertension 3/10/2017    Fe deficiency anemia 10/27/2012    Fibromyalgia 12/16/2016    Gastroesophageal reflux disease without esophagitis 10/10/2016    Gastrointestinal disorder     \"blockage in my stomach\"    Hypercholesterolemia     Hypertension     Hypertensive heart failure ([address] 12/19/2017    Morbid obesity due to excess calories ([address] 3/10/2017    NSTEMI (non-ST elevated myocardial infarction) ([address] 3/22/2020    On home O2 12/3/2015 Overview:  2L at home per pt for approx 8 years    Polysubstance abuse ([facility] 9/15/2018    Respiratory failure ([facility] 1/11/2017    S/P hernia repair 10/31/2019    Sleep apnea 12/19/2017    T wave inversion in EKG 3/9/2019    Tobacco use 2/00/9057    Uncomplicated severe persistent asthma 12/13/2016    Vocal cord disease 12/7/2016      Past Surgical History:   Procedure Laterality Date    HX GI      Exploratory laparotomy with lysis of adhesions and primary repair of incarcerated umbilical hernia      Prior to Admission medications    Medication Sig Start Date End Date Taking? Authorizing Provider   citalopram (CeleXA) 20 mg tablet 1 tablet 12/17/18   Provider, Historical   gemfibroziL (LOPID) 600 mg tablet 1 tablet 7/24/19   Provider, Historical   hydroCHLOROthiazide (HYDRODIURIL) 25 mg tablet 1 tablet in the morning    Provider, Historical   hydrOXYzine HCL (ATARAX) 50 mg tablet 1 tablet as needed    Provider, Historical   isosorbide mononitrate ER (IMDUR) 30 mg tablet Take 30 mg by mouth daily. 8/8/20   Provider, Historical   lisinopriL (PRINIVIL, ZESTRIL) 20 mg tablet 1 tablet 2/8/19   Provider, Historical   sucralfate (CARAFATE) 100 mg/mL suspension Take 1,000 mg by mouth. 12/17/18   Provider, Historical   QUEtiapine (SEROquel) 50 mg tablet TAKE 1 TABLET BY MOUTH AT BEDTIME FOR MOOD 7/5/20   Provider, Historical   albuterol-ipratropium (DUO-NEB) 2.5 mg-0.5 mg/3 ml nebu 3 mL by Nebulization route every four (4) hours as needed for Wheezing. 9/6/20   Steven Sy MD   predniSONE (DELTASONE) 50 mg tablet Take 1 Tab by mouth daily for 5 days. 9/6/20 9/11/20  Steven Sy MD   doxycycline (VIBRA-TABS) 100 mg tablet Take 1 Tab by mouth two (2) times a day for 7 days.  9/6/20 9/13/20  Steven Sy MD   albuterol sulfate 90 mcg/actuation aebs Take 2 Puffs by inhalation every four to six (4-6) hours as needed for Wheezing. 7/28/20   Dandre Walter MD   furosemide (Lasix) 20 mg tablet Take 1 Tab by mouth daily. 7/20/20   Buck Clark MD   cloNIDine HCL (CATAPRES) 0.2 mg tablet Take 1 Tab by mouth two (2) times a day. 6/22/20   Nick Zarate MD   amLODIPine (NORVASC) 10 mg tablet Take 1 Tab by mouth daily. 6/22/20   Nick Zarate MD   ALPRAZolam (Xanax) 0.5 mg tablet Take 1 Tab by mouth every eight (8) hours as needed for Anxiety. Max Daily Amount: 1.5 mg. 6/16/20   Stacia Lua MD   aspirin delayed-release 81 mg tablet Take 1 Tab by mouth daily. 3/27/20   Sammy Rowland MD   famotidine (PEPCID) 20 mg tablet Take 1 Tab by mouth daily. 3/26/20   Sammy Rowland MD   OXYGEN-AIR DELIVERY SYSTEMS 3 L by IntraNASal route as needed for Other (sob). Provider, Historical   docusate sodium (COLACE) 100 mg capsule Take 1 Cap by mouth two (2) times a day. 10/17/19   Sherly Bro MD   fluticasone furoate-vilanterol (BREO ELLIPTA) 200-25 mcg/dose inhaler Take 1 Puff by inhalation daily. Shalini Knox MD   arformoterol (BROVANA) 15 mcg/2 mL nebu neb solution 2 mL by Nebulization route two (2) times a day. 3/14/19   Manoj Estrada MD   budesonide (PULMICORT) 0.5 mg/2 mL nbsp 2 mL by Nebulization route two (2) times a day. 3/14/19   Manoj Estrada MD   tiotropium (SPIRIVA) 18 mcg inhalation capsule Take 1 Cap by inhalation daily. 3/14/19   Manoj Estrada MD   atorvastatin (LIPITOR) 20 mg tablet Take 1 Tab by mouth nightly. 3/14/19   Manoj Estrada MD   metoprolol tartrate (LOPRESSOR) 25 mg tablet Take 0.5 Tabs by mouth every twelve (12) hours. 3/14/19   Manoj Estrada MD   roflumilast (DALIRESP) 500 mcg tab tablet Take 1 Tab by mouth daily. 3/15/19   Manoj Estrada MD   naloxone [facility]) 4 mg/actuation nasal spray Use 1 spray intranasally, then discard. Repeat with new spray every 2 min as needed for opioid overdose symptoms, alternating nostrils. 3/14/19   Manoj Estrada MD   montelukast (SINGULAIR) 10 mg tablet Take 1 Tab by mouth nightly.  3/9/17   Ruthy Trevino MD     No Known Allergies   Social History     Tobacco Use    Smoking status: Current Every Day Smoker     Packs/day: 0.25    Smokeless tobacco: Current User   Substance Use Topics    Alcohol use: No     Comment: socially      History reviewed. No pertinent family history. Current Facility-Administered Medications   Medication Dose Route Frequency    methylPREDNISolone (PF) (SOLU-MEDROL) injection 40 mg  40 mg IntraVENous Q8H    methadone (DOLOPHINE) tablet 10 mg  10 mg Oral DAILY    albuterol (PROVENTIL HFA, VENTOLIN HFA, PROAIR HFA) inhaler 2 Puff  2 Puff Inhalation Q6H RT    azithromycin (ZITHROMAX) 500 mg in  mL  500 mg IntraVENous Q24H    insulin lispro (HUMALOG) injection   SubCUTAneous Q6H    zinc sulfate (ZINCATE) 220 (50) mg capsule 1 Cap  1 Cap Oral DAILY    ascorbic acid (vitamin C) (VITAMIN C) tablet 500 mg  500 mg Oral DAILY    cholecalciferol (VITAMIN D3) (1000 Units /25 mcg) tablet 2 Tab  2,000 Units Oral DAILY    amLODIPine (NORVASC) tablet 10 mg  10 mg Oral DAILY    aspirin delayed-release tablet 81 mg  81 mg Oral DAILY    atorvastatin (LIPITOR) tablet 20 mg  20 mg Oral QHS    famotidine (PEPCID) tablet 20 mg  20 mg Oral DAILY    furosemide (LASIX) tablet 20 mg  20 mg Oral DAILY    hydroCHLOROthiazide (HYDRODIURIL) tablet 25 mg  25 mg Oral DAILY    isosorbide mononitrate ER (IMDUR) tablet 30 mg  30 mg Oral DAILY    lisinopriL (PRINIVIL, ZESTRIL) tablet 20 mg  20 mg Oral DAILY    metoprolol tartrate (LOPRESSOR) tablet 12.5 mg  12.5 mg Oral Q12H    montelukast (SINGULAIR) tablet 10 mg  10 mg Oral QHS    sodium chloride (NS) flush 5-40 mL  5-40 mL IntraVENous Q8H    enoxaparin (LOVENOX) injection 40 mg  40 mg SubCUTAneous DAILY       Review of Systems:  Pertinent items are noted in HPI.   ROS    Objective:   Vital Signs:    Visit Vitals  /64 (BP 1 Location: Left arm, BP Patient Position: At rest)   Pulse 80   Temp 97.5 °F (36.4 °C)   Resp 18   Ht 4' 11.02\" (1.499 m)   Wt 71.2 kg (157 lb)   LMP 2009   SpO2 100%   BMI 31.69 kg/m²       O2 Device: Nasal cannula   O2 Flow Rate (L/min): 2 l/min   Temp (24hrs), Av.6 °F (36.4 °C), Min:97 °F (36.1 °C), Max:98.1 °F (36.7 °C)       Intake/Output:   Last shift:      No intake/output data recorded. Last 3 shifts: No intake/output data recorded. No intake or output data in the 24 hours ending 20 1451   Physical Exam:   General:  Alert, cooperative, no distress, appears stated age. Head:  Normocephalic, without obvious abnormality, atraumatic. Lungs:   Bilateral auscultation- poor air movement, harsh wheezing, no rales. Chest wall:  No tenderness or deformity. NO CREPITUS   Heart:  Regular rate and rhythm, S1, S2 normal, no murmur, click, rub or gallop. Abdomen:   Soft, non-tender. Bowel sounds normal. No masses,  No organomegaly. No paradox   Extremities: normal, atraumatic, no cyanosis or edema. Pulses: 1-2+ and symmetric all extremities.    Skin: Skin color, texture, turgor normal. No rashes or lesions   Lymph nodes: Cervical, supraclavicular, and axillary nodes normal.   Neurologic: Grossly nonfocal          Data review:   Labs:  Recent Results (from the past 24 hour(s))   EKG, 12 LEAD, INITIAL    Collection Time: 20  7:44 PM   Result Value Ref Range    Ventricular Rate 64 BPM    Atrial Rate 64 BPM    P-R Interval 130 ms    QRS Duration 92 ms    Q-T Interval 430 ms    QTC Calculation (Bezet) 443 ms    Calculated P Axis 75 degrees    Calculated R Axis 69 degrees    Calculated T Axis 43 degrees    Diagnosis       Normal sinus rhythm  Normal ECG  Confirmed by ae Staff, Evens RAND ([id number]) on 2020 0:78:32 AM     METABOLIC PANEL, COMPREHENSIVE    Collection Time: 20  7:55 PM   Result Value Ref Range    Sodium 139 136 - 145 mmol/L    Potassium 4.6 3.5 - 5.5 mmol/L    Chloride 107 100 - 111 mmol/L    CO2 25 21 - 32 mmol/L    Anion gap 7 3.0 - 18 mmol/L    Glucose 146 (H) 74 - 99 mg/dL    BUN 13 7.0 - 18 MG/DL Creatinine 1.07 0.6 - 1.3 MG/DL    BUN/Creatinine ratio 12 12 - 20      GFR est AA >60 >60 ml/min/1.73m2    GFR est non-AA 53 (L) >60 ml/min/1.73m2    Calcium 9.2 8.5 - 10.1 MG/DL    Bilirubin, total 0.3 0.2 - 1.0 MG/DL    ALT (SGPT) 20 13 - 56 U/L    AST (SGOT) 8 (L) 10 - 38 U/L    Alk. phosphatase 86 45 - 117 U/L    Protein, total 7.1 6.4 - 8.2 g/dL    Albumin 3.8 3.4 - 5.0 g/dL    Globulin 3.3 2.0 - 4.0 g/dL    A-G Ratio 1.2 0.8 - 1.7     CBC WITH AUTOMATED DIFF    Collection Time: 09/07/20  7:55 PM   Result Value Ref Range    WBC 11.9 4.6 - 13.2 K/uL    RBC 4.23 4.20 - 5.30 M/uL    HGB 12.4 12.0 - 16.0 g/dL    HCT 37.3 35.0 - 45.0 %    MCV 88.2 74.0 - 97.0 FL    MCH 29.3 24.0 - 34.0 PG    MCHC 33.2 31.0 - 37.0 g/dL    RDW 13.8 11.6 - 14.5 %    PLATELET 995 397 - 739 K/uL    MPV 10.4 9.2 - 11.8 FL    NEUTROPHILS 94 (H) 40 - 73 %    LYMPHOCYTES 5 (L) 21 - 52 %    MONOCYTES 1 (L) 3 - 10 %    EOSINOPHILS 0 0 - 5 %    BASOPHILS 0 0 - 2 %    ABS. NEUTROPHILS 11.2 (H) 1.8 - 8.0 K/UL    ABS. LYMPHOCYTES 0.6 (L) 0.9 - 3.6 K/UL    ABS. MONOCYTES 0.1 0.05 - 1.2 K/UL    ABS. EOSINOPHILS 0.0 0.0 - 0.4 K/UL    ABS.  BASOPHILS 0.0 0.0 - 0.1 K/UL    DF AUTOMATED     URINALYSIS W/ RFLX MICROSCOPIC    Collection Time: 09/07/20  9:44 PM   Result Value Ref Range    Color YELLOW      Appearance CLOUDY      Specific gravity 1.027 1.005 - 1.030      pH (UA) 5.5 5.0 - 8.0      Protein TRACE (A) NEG mg/dL    Glucose 100 (A) NEG mg/dL    Ketone Negative NEG mg/dL    Bilirubin Negative NEG      Blood Negative NEG      Urobilinogen 1.0 0.2 - 1.0 EU/dL    Nitrites Negative NEG      Leukocyte Esterase Negative NEG     DRUG SCREEN, URINE    Collection Time: 09/07/20  9:44 PM   Result Value Ref Range    BENZODIAZEPINES Negative NEG      BARBITURATES Negative NEG      THC (TH-CANNABINOL) Negative NEG      OPIATES Positive (A) NEG      PCP(PHENCYCLIDINE) Negative NEG      COCAINE Positive (A) NEG      AMPHETAMINES Negative NEG      METHADONE Positive (A) NEG      HDSCOM (NOTE)    URINE MICROSCOPIC ONLY    Collection Time: 09/07/20  9:44 PM   Result Value Ref Range    WBC Negative 0 - 4 /hpf    RBC 1 to 3 0 - 5 /hpf    Epithelial cells 2+ 0 - 5 /lpf    Bacteria Negative NEG /hpf   EKG, 12 LEAD, INITIAL    Collection Time: 09/08/20 12:17 AM   Result Value Ref Range    Ventricular Rate 66 BPM    Atrial Rate 66 BPM    P-R Interval 130 ms    QRS Duration 92 ms    Q-T Interval 404 ms    QTC Calculation (Bezet) 423 ms    Calculated P Axis 81 degrees    Calculated R Axis 74 degrees    Calculated T Axis 47 degrees    Diagnosis       Normal sinus rhythm  Normal ECG  When compared with ECG of 07-SEP-2020 19:44,  No significant change was found  Confirmed by Sol Dao MD, Ofelia Delgadillo ([id number]) on 9/8/2020 8:27:46 AM     GLUCOSE, POC    Collection Time: 09/08/20 12:46 AM   Result Value Ref Range    Glucose (POC) 122 (H) 70 - 110 mg/dL   CBC WITH AUTOMATED DIFF    Collection Time: 09/08/20  4:18 AM   Result Value Ref Range    WBC 13.3 (H) 4.6 - 13.2 K/uL    RBC 4.50 4. 20 - 5.30 M/uL    HGB 13.3 12.0 - 16.0 g/dL    HCT 39.9 35.0 - 45.0 %    MCV 88.7 74.0 - 97.0 FL    MCH 29.6 24.0 - 34.0 PG    MCHC 33.3 31.0 - 37.0 g/dL    RDW 13.7 11.6 - 14.5 %    PLATELET 985 035 - 390 K/uL    MPV 11.0 9.2 - 11.8 FL    NEUTROPHILS 93 (H) 40 - 73 %    LYMPHOCYTES 6 (L) 21 - 52 %    MONOCYTES 1 (L) 3 - 10 %    EOSINOPHILS 0 0 - 5 %    BASOPHILS 0 0 - 2 %    ABS. NEUTROPHILS 12.4 (H) 1.8 - 8.0 K/UL    ABS. LYMPHOCYTES 0.8 (L) 0.9 - 3.6 K/UL    ABS. MONOCYTES 0.1 0.05 - 1.2 K/UL    ABS. EOSINOPHILS 0.0 0.0 - 0.4 K/UL    ABS.  BASOPHILS 0.0 0.0 - 0.1 K/UL    DF AUTOMATED     METABOLIC PANEL, BASIC    Collection Time: 09/08/20  4:18 AM   Result Value Ref Range    Sodium 139 136 - 145 mmol/L    Potassium 4.9 3.5 - 5.5 mmol/L    Chloride 105 100 - 111 mmol/L    CO2 27 21 - 32 mmol/L    Anion gap 7 3.0 - 18 mmol/L    Glucose 117 (H) 74 - 99 mg/dL    BUN 19 (H) 7.0 - 18 MG/DL    Creatinine 0.94 0.6 - 1.3 MG/DL    BUN/Creatinine ratio 20 12 - 20      GFR est AA >60 >60 ml/min/1.73m2    GFR est non-AA >60 >60 ml/min/1.73m2    Calcium 9.0 8.5 - 10.1 MG/DL   HEPATIC FUNCTION PANEL    Collection Time: 09/08/20  4:18 AM   Result Value Ref Range    Protein, total 7.5 6.4 - 8.2 g/dL    Albumin 3.9 3.4 - 5.0 g/dL    Globulin 3.6 2.0 - 4.0 g/dL    A-G Ratio 1.1 0.8 - 1.7      Bilirubin, total 0.4 0.2 - 1.0 MG/DL    Bilirubin, direct 0.1 0.0 - 0.2 MG/DL    Alk.  phosphatase 87 45 - 117 U/L    AST (SGOT) 22 10 - 38 U/L    ALT (SGPT) 23 13 - 56 U/L   LD    Collection Time: 09/08/20  4:18 AM   Result Value Ref Range     (H) 81 - 234 U/L   FERRITIN    Collection Time: 09/08/20  4:18 AM   Result Value Ref Range    Ferritin 52 8 - 388 NG/ML   C REACTIVE PROTEIN, QT    Collection Time: 09/08/20  4:18 AM   Result Value Ref Range    C-Reactive protein 0.8 (H) 0 - 0.3 mg/dL   D DIMER    Collection Time: 09/08/20  4:18 AM   Result Value Ref Range    D DIMER 0.31 <0.46 ug/ml(FEU)   PROCALCITONIN    Collection Time: 09/08/20  4:18 AM   Result Value Ref Range    Procalcitonin <0.05 ng/mL   CK-MB,QT    Collection Time: 09/08/20  4:18 AM   Result Value Ref Range    CK - MB 1.7 <3.6 ng/ml    CK-MB Index 2.2 0.0 - 4.0 %   CK    Collection Time: 09/08/20  4:18 AM   Result Value Ref Range    CK 76 26 - 192 U/L   TROPONIN I    Collection Time: 09/08/20  4:18 AM   Result Value Ref Range    Troponin-I, QT <0.02 0.0 - 0.045 NG/ML   EKG, 12 LEAD, SUBSEQUENT    Collection Time: 09/08/20  1:26 PM   Result Value Ref Range    Ventricular Rate 78 BPM    Atrial Rate 78 BPM    P-R Interval 130 ms    QRS Duration 86 ms    Q-T Interval 382 ms    QTC Calculation (Bezet) 435 ms    Calculated P Axis 79 degrees    Calculated R Axis 73 degrees    Calculated T Axis 66 degrees    Diagnosis       Normal sinus rhythm  Nonspecific T wave abnormality  Abnormal ECG  When compared with ECG of 08-SEP-2020 00:17,  Nonspecific T wave abnormality, worse in Lateral leads  Confirmed by Letty Seth MD, [facility] ([id number]) on 9/8/2020 1:58:12 PM       Imaging:  I have personally reviewed the patients radiographs and have reviewed the reports:  CXR Results  (Last 48 hours)               09/07/20 0648  XR CHEST PORT Final result    Impression:  IMPRESSION:   1. No evidence of acute cardiopulmonary disease. Improved pulmonary edema. Narrative:  PORTABLE CHEST X-RAY       CPT CODE: 84264        INDICATION: Chest pain. Shortness of breath. .       COMPARISON: Prior exam 9/6/2020. FINDINGS:    Heart size borderline enlarged, similar. Aortic arch not enlarged. Similar   central pulmonary vascular engorgement. Since the prior exam there has been slight interval improvement in degree of   hazy bilateral perihilar opacities which suggests improving edema. No pneumothorax appreciated. No overt pulmonary edema. No focal infiltrate or consolidation. No significant effusion on portable AP   image. CT Results  (Last 48 hours)    None            High complexity decision making was performed during the evaluation of this patient at high risk for decompensation with multiple organ involvement     Above mentioned total time spent on reviewing the case/medical record/data/notes/EMR/patient examination/documentation/coordinating care with nurse/consultants, exclusive of procedures with complex decision making performed and > 50% time spent in face to face evaluation.      Jason Wheat MD

## 2020-09-08 NOTE — ED NOTES
Attending Physician Note for Supervision of   Resident Physician Care of Patient    I have personally seen and examined this patient. I have fully participated in the care of this patient. I have reviewed all pertinent clinical information, including history, physical exam and plan. Physical exam was performed by the [address] under my direct supervision. I, as the supervising physician, was at the bedside to perform a direct face-to-face PMH, ROS, HPI, and physical examination of the patient in addition to the resident physician's evaluation. Diagnostic Study Results     Abnormal lab results from this emergency department encounter:  Labs Reviewed   METABOLIC PANEL, COMPREHENSIVE - Abnormal; Notable for the following components:       Result Value    Glucose 146 (*)     GFR est non-AA 53 (*)     AST (SGOT) 8 (*)     All other components within normal limits   CBC WITH AUTOMATED DIFF - Abnormal; Notable for the following components:    NEUTROPHILS 94 (*)     LYMPHOCYTES 5 (*)     MONOCYTES 1 (*)     ABS. NEUTROPHILS 11.2 (*)     ABS.  LYMPHOCYTES 0.6 (*)     All other components within normal limits   URINALYSIS W/ RFLX MICROSCOPIC   DRUG SCREEN, URINE   BLOOD GAS, ARTERIAL       Lab values for this patient within approximately the last 12 hours:  Recent Results (from the past 12 hour(s))   CARDIAC PANEL,(CK, CKMB & TROPONIN)    Collection Time: 09/07/20 10:05 AM   Result Value Ref Range    CK - MB 2.2 <3.6 ng/ml    CK-MB Index 3.2 0.0 - 4.0 %    CK 68 26 - 192 U/L    Troponin-I, QT <0.02 0.0 - 2.761 NG/ML   METABOLIC PANEL, COMPREHENSIVE    Collection Time: 09/07/20  7:55 PM   Result Value Ref Range    Sodium 139 136 - 145 mmol/L    Potassium 4.6 3.5 - 5.5 mmol/L    Chloride 107 100 - 111 mmol/L    CO2 25 21 - 32 mmol/L    Anion gap 7 3.0 - 18 mmol/L    Glucose 146 (H) 74 - 99 mg/dL    BUN 13 7.0 - 18 MG/DL    Creatinine 1.07 0.6 - 1.3 MG/DL    BUN/Creatinine ratio 12 12 - 20      GFR est AA >60 >60 ml/min/1.73m2    GFR est non-AA 53 (L) >60 ml/min/1.73m2    Calcium 9.2 8.5 - 10.1 MG/DL    Bilirubin, total 0.3 0.2 - 1.0 MG/DL    ALT (SGPT) 20 13 - 56 U/L    AST (SGOT) 8 (L) 10 - 38 U/L    Alk. phosphatase 86 45 - 117 U/L    Protein, total 7.1 6.4 - 8.2 g/dL    Albumin 3.8 3.4 - 5.0 g/dL    Globulin 3.3 2.0 - 4.0 g/dL    A-G Ratio 1.2 0.8 - 1.7     CBC WITH AUTOMATED DIFF    Collection Time: 09/07/20  7:55 PM   Result Value Ref Range    WBC 11.9 4.6 - 13.2 K/uL    RBC 4.23 4.20 - 5.30 M/uL    HGB 12.4 12.0 - 16.0 g/dL    HCT 37.3 35.0 - 45.0 %    MCV 88.2 74.0 - 97.0 FL    MCH 29.3 24.0 - 34.0 PG    MCHC 33.2 31.0 - 37.0 g/dL    RDW 13.8 11.6 - 14.5 %    PLATELET 136 989 - 201 K/uL    MPV 10.4 9.2 - 11.8 FL    NEUTROPHILS 94 (H) 40 - 73 %    LYMPHOCYTES 5 (L) 21 - 52 %    MONOCYTES 1 (L) 3 - 10 %    EOSINOPHILS 0 0 - 5 %    BASOPHILS 0 0 - 2 %    ABS. NEUTROPHILS 11.2 (H) 1.8 - 8.0 K/UL    ABS. LYMPHOCYTES 0.6 (L) 0.9 - 3.6 K/UL    ABS. MONOCYTES 0.1 0.05 - 1.2 K/UL    ABS. EOSINOPHILS 0.0 0.0 - 0.4 K/UL    ABS. BASOPHILS 0.0 0.0 - 0.1 K/UL    DF AUTOMATED         Radiologist and cardiologist interpretations if available at time of this note:  Xr Chest Port    Result Date: 9/7/2020  PORTABLE CHEST X-RAY CPT CODE: [id number] INDICATION: Chest pain. Shortness of breath. . COMPARISON: Prior exam 9/6/2020. FINDINGS: Heart size borderline enlarged, similar. Aortic arch not enlarged. Similar central pulmonary vascular engorgement. Since the prior exam there has been slight interval improvement in degree of hazy bilateral perihilar opacities which suggests improving edema. No pneumothorax appreciated. No overt pulmonary edema. No focal infiltrate or consolidation. No significant effusion on portable AP image. IMPRESSION: 1. No evidence of acute cardiopulmonary disease. Improved pulmonary edema.       Medication(s) ordered for patient during this emergency visit encounter:  Medications   azithromycin (ZITHROMAX) 500 mg in  mL (500 mg IntraVENous Given 9/7/20 2106)   acetaminophen (TYLENOL) tablet 1,000 mg (1,000 mg Oral Refused 9/7/20 2113)   albuterol-ipratropium (DUO-NEB) 2.5 MG-0.5 MG/3 ML (3 mL Nebulization Given 9/7/20 2133)   albuterol-ipratropium (DUO-NEB) 2.5 MG-0.5 MG/3 ML (3 mL Nebulization Given 9/7/20 2133)   albuterol-ipratropium (DUO-NEB) 2.5 MG-0.5 MG/3 ML (3 mL Nebulization Given 9/7/20 2133)   methylPREDNISolone (PF) (Solu-MEDROL) injection 125 mg (125 mg IntraVENous Given 9/7/20 2133)       Medical Decision Making     I am the first provider for this patient. I reviewed the vital signs, available nursing notes, past medical history, past surgical history, family history and social history. Vital Signs:  Reviewed the patient's vital signs. Physician comments:   Patient was having a known COPD exacerbation and was seen earlier in the emergency department at [facility].  She left [address] to take care of some business at home and then subsequently return to the ER in moderate respiratory distress and shortness of breath. She wants to be admitted to the hospital now. Patient be placed on BiPAP and admitted to the hospital.    Cuauhtemoc Mcnair. Rylee Carlos M.D.   DAVON Board Certified Emergency Physician

## 2020-09-08 NOTE — PROGRESS NOTES
Physician Progress Note      Honey Alexandre  CSN #:                  [id number]  :                       1965  ADMIT DATE:       2020 7:40 PM  DISCH DATE:  RESPONDING  PROVIDER #:        Radhika ROLDAN NP          QUERY TEXT:    Pt admitted with COPD with acute lower respiratory infection and has chronic CHF documented. If possible, please document in progress notes and discharge summary further specificity regarding the type and acuity of CHF:    The medical record reflects the following:  Risk Factors: COPD, chronic respiratory failure    Clinical Indicators: \"Chronic diastolic CHF, compensated\"  per Dr. Nancy Ortega, H&P  \"Chronic systolic HF\" per Raquel Joel NP, H&P  BNP:  684 on 2020  \"FINDINGS:  Heart size borderline enlarged, similar. Aortic arch not enlarged. Similar  central pulmonary vascular engorgement. Since the prior exam there has been slight interval improvement in degree of  hazy bilateral perihilar opacities which suggests improving edema. \"  per PCXR report, 2020. \"Pulmonary: expiratory wheeze t/o  Extremities:  Trace pedal edema\"  per Raquel Joel NP, H&P    Treatment: methylprednisolone q8h,  methylprednisolone 125mg x2 doses on 2020, furosemide 40mg IV x1 on 2020, metoprolol tartrate po    Thank you,  PEDRITO Dickinson RN, [email or web address]  Office #:  [phone]  Options provided:  -- Chronic Systolic CHF/HFrEF  -- Chronic Diastolic CHF/HFpEF  -- Chronic Systolic and Diastolic CHF  -- Other - I will add my own diagnosis  -- Disagree - Not applicable / Not valid  -- Disagree - Clinically unable to determine / Unknown  -- Refer to Clinical Documentation Reviewer    PROVIDER RESPONSE TEXT:    This patient has chronic systolic CHF/HFrEF. Query created by:  Amy Sweeney on 2020 9:43 AM      QUERY TEXT:    Pt admitted with COPD with lower respiratory infection and has chronic respiratory failure with hypoxia documented. If possible, please document in progress notes and discharge summary further specificity regarding the type and acuity of respiratory failure:[[Acute on chronic respiratory failure with hypoxia and hypercapnia[de-identified] This patient is in acute on chronic respiratory failure with hypoxia and hypercapnia.]    The medical record reflects the following:  Risk Factors: COPD with lower respiratory infection, acute bronchitis, severe persistent asthma with acute exacerbation, chronic respiratory failure with hypoxia    Clinical Indicators:  \"Pulmonary: expiratory wheeze t/o\"  and  \"On home O2, Overview:  2L at home per pt for approx 8 years\"  and  \"Apparently, she ran out of most of her medications including respiratory and cardiac medications in the last weeks, and has developed cough and more shortness of breath on the last few days. \"  and  \"She has not use her CPAP due to concern for mold in her machine. \"  and  \"In the ER, she was found with shortness of breath and using accessory muscles. She was found to have worsening hypoxia on exertion. \"   per Tobi Randolph NP, H&P.  COVID 19 results pending  BiPap upon admission and now on oxygen @ 2 L/min NC with O2 sats at 97%    Treatment:  methylprednisolone IV x2 upon on 9/7/2020  methylprednisolone IV q8h start 9/8/2020  montelukast po qhs  Duo Neb nebulizer x1 on 9/7/2020  albuterol nebulizer q15min x2 on 9/7/2020  oxygen @ 2 l/min NC at baseline    Thank you,  2 Jonathan Rodriguez RN, [email or web address]  Office #:  [phone]  Options provided:  -- Chronic respiratory failure with hypoxia only  -- Chronic respiratory failure with hypoxia and hypercapnia  -- Acute on chronic respiratory failure with hypoxia  -- Other - I will add my own diagnosis  -- Disagree - Not applicable / Not valid  -- Disagree - Clinically unable to determine / Unknown  -- Refer to Clinical Documentation Reviewer    PROVIDER RESPONSE TEXT:    This patient is in acute on chronic respiratory failure with hypoxia. Query created by:  Kayden Phan on 9/8/2020 10:01 AM      Electronically signed by:  Ezequiel Cruz NP 9/8/2020 6:11 PM

## 2020-09-08 NOTE — PROGRESS NOTES
Notice received of patient's ED Visit of 9/7/2020. Per Chart Review patient is currently admitted into [facility]. Episode of Care Closed at this time.

## 2020-09-08 NOTE — ED NOTES
Pt uncooperative and hostile with staff. Pt states she was angry that she is not already admitted and upstairs. Pt was informed that the Md had ordered IV steroids, antibiotics and breath treatments. Pt was taken to a fast track room suitable for aerosol medication administration and pt became verbally aggressive. Pt states she will leave after her treatments because she felt mistreated. Pt was informed that the room was only temporary and bed placement is in order. Pt was unable to calm herself and RN medicated her as ordered and left room due to verbal abuse from pt. Security notified. Pt has been in ER frequently and is familiar with staff and procedures. RN attempted to comfort and acknowledged pt concerns. ED charge nurse ordered change of rooms and pt was updated.

## 2020-09-08 NOTE — ED NOTES
TRANSFER - OUT REPORT:    Verbal report given to Liliana Grimaldo RN (name) [facility]  [facility]  (unit) for routine progression of care       Report consisted of patients Situation, Background, Assessment and   Recommendations(SBAR). Information from the following report(s) SBAR was reviewed with the receiving nurse. Lines:   Peripheral IV 09/07/20 Left Hand (Active)   Site Assessment Clean, dry, & intact 09/07/20 2229   Phlebitis Assessment 0 09/07/20 2229   Infiltration Assessment 0 09/07/20 2229   Dressing Status Clean, dry, & intact 09/07/20 2229   Dressing Type Transparent 09/07/20 2229   Hub Color/Line Status Blue;Patent; Flushed 09/07/20 2229        Opportunity for questions and clarification was provided.       Patient transported with:   [employer]

## 2020-09-08 NOTE — PROGRESS NOTES
[facility] Hospitalist Group  Progress Note    Patient: Anuja Perkins Age: 63 y.o. : 1965 MR#: [id number] SSN: [id number]  Date/Time: 2020    Subjective:     Patient is sitting in bed has dyspnea on exertion    Assessment/Plan:     Acute respiratory distress due to #2  2. COPD/asthma exacerbation  3. Noncardiogenic pulmonary edema could be due to #4  4. Positive urine drug screen for cocaine, methadone and opiates  5. Medical noncompliance  6. Acute bronchitis, needs to rule out COVID  7. Chronic diastolic CHF, compensated  8. Atypical chest pain, no ACS most likely chest discomfort from COPD/asthma exacerbation  9. Moderate intermittent chronic asthma      PLAN  Continue oxygen, bronchodilators, steroids, azithromycin  Pulmonary is following  COVID-19 test result is pending, continue droplet plus precautions  Monitor blood pressures, on lisinopril  Continue p.o.  Lasix   Discussed with patient, full code  D/w RN    Case discussed with:  [x]Patient  []Family  [x]Nursing  []Case Management  DVT Prophylaxis:  [x]Lovenox  []Hep SQ  []SCDs  []Coumadin   []On Heparin gtt    Objective:   VS:   Visit Vitals  /55 (BP 1 Location: Right arm, BP Patient Position: At rest)   Pulse 67   Temp 97.4 °F (36.3 °C)   Resp 18   Ht 4' 11.02\" (1.499 m)   Wt 71.2 kg (157 lb)   LMP 2009   SpO2 93%   BMI 31.69 kg/m²      Tmax/24hrs: Temp (24hrs), Av.6 °F (36.4 °C), Min:97 °F (36.1 °C), Max:98.1 °F (36.7 °C)    Input/Output: No intake or output data in the 24 hours ending 20 1847    General:  Awake, follows commands  Cardiovascular:  S1S2+, RRR  Pulmonary: Bilateral wheezing, coarse breath sounds  GI:  Soft, BS+, NT, ND  Extremities:  No edema    Labs:    Recent Results (from the past 24 hour(s))   EKG, 12 LEAD, INITIAL    Collection Time: 20  7:44 PM   Result Value Ref Range    Ventricular Rate 64 BPM    Atrial Rate 64 BPM    P-R Interval 130 ms    QRS Duration 92 ms Q-T Interval 430 ms    QTC Calculation (Bezet) 443 ms    Calculated P Axis 75 degrees    Calculated R Axis 69 degrees    Calculated T Axis 43 degrees    Diagnosis       Normal sinus rhythm  Normal ECG  Confirmed by Howie Mauro MD, Olufiona Jamiltrino ([id number]) on 9/8/2020 9:13:25 AM     METABOLIC PANEL, COMPREHENSIVE    Collection Time: 09/07/20  7:55 PM   Result Value Ref Range    Sodium 139 136 - 145 mmol/L    Potassium 4.6 3.5 - 5.5 mmol/L    Chloride 107 100 - 111 mmol/L    CO2 25 21 - 32 mmol/L    Anion gap 7 3.0 - 18 mmol/L    Glucose 146 (H) 74 - 99 mg/dL    BUN 13 7.0 - 18 MG/DL    Creatinine 1.07 0.6 - 1.3 MG/DL    BUN/Creatinine ratio 12 12 - 20      GFR est AA >60 >60 ml/min/1.73m2    GFR est non-AA 53 (L) >60 ml/min/1.73m2    Calcium 9.2 8.5 - 10.1 MG/DL    Bilirubin, total 0.3 0.2 - 1.0 MG/DL    ALT (SGPT) 20 13 - 56 U/L    AST (SGOT) 8 (L) 10 - 38 U/L    Alk. phosphatase 86 45 - 117 U/L    Protein, total 7.1 6.4 - 8.2 g/dL    Albumin 3.8 3.4 - 5.0 g/dL    Globulin 3.3 2.0 - 4.0 g/dL    A-G Ratio 1.2 0.8 - 1.7     CBC WITH AUTOMATED DIFF    Collection Time: 09/07/20  7:55 PM   Result Value Ref Range    WBC 11.9 4.6 - 13.2 K/uL    RBC 4.23 4.20 - 5.30 M/uL    HGB 12.4 12.0 - 16.0 g/dL    HCT 37.3 35.0 - 45.0 %    MCV 88.2 74.0 - 97.0 FL    MCH 29.3 24.0 - 34.0 PG    MCHC 33.2 31.0 - 37.0 g/dL    RDW 13.8 11.6 - 14.5 %    PLATELET 466 618 - 226 K/uL    MPV 10.4 9.2 - 11.8 FL    NEUTROPHILS 94 (H) 40 - 73 %    LYMPHOCYTES 5 (L) 21 - 52 %    MONOCYTES 1 (L) 3 - 10 %    EOSINOPHILS 0 0 - 5 %    BASOPHILS 0 0 - 2 %    ABS. NEUTROPHILS 11.2 (H) 1.8 - 8.0 K/UL    ABS. LYMPHOCYTES 0.6 (L) 0.9 - 3.6 K/UL    ABS. MONOCYTES 0.1 0.05 - 1.2 K/UL    ABS. EOSINOPHILS 0.0 0.0 - 0.4 K/UL    ABS.  BASOPHILS 0.0 0.0 - 0.1 K/UL    DF AUTOMATED     URINALYSIS W/ RFLX MICROSCOPIC    Collection Time: 09/07/20  9:44 PM   Result Value Ref Range    Color YELLOW      Appearance CLOUDY      Specific gravity 1.027 1.005 - 1.030      pH (UA) 5.5 5.0 - 8.0      Protein TRACE (A) NEG mg/dL    Glucose 100 (A) NEG mg/dL    Ketone Negative NEG mg/dL    Bilirubin Negative NEG      Blood Negative NEG      Urobilinogen 1.0 0.2 - 1.0 EU/dL    Nitrites Negative NEG      Leukocyte Esterase Negative NEG     DRUG SCREEN, URINE    Collection Time: 09/07/20  9:44 PM   Result Value Ref Range    BENZODIAZEPINES Negative NEG      BARBITURATES Negative NEG      THC (TH-CANNABINOL) Negative NEG      OPIATES Positive (A) NEG      PCP(PHENCYCLIDINE) Negative NEG      COCAINE Positive (A) NEG      AMPHETAMINES Negative NEG      METHADONE Positive (A) NEG      HDSCOM (NOTE)    URINE MICROSCOPIC ONLY    Collection Time: 09/07/20  9:44 PM   Result Value Ref Range    WBC Negative 0 - 4 /hpf    RBC 1 to 3 0 - 5 /hpf    Epithelial cells 2+ 0 - 5 /lpf    Bacteria Negative NEG /hpf   EKG, 12 LEAD, INITIAL    Collection Time: 09/08/20 12:17 AM   Result Value Ref Range    Ventricular Rate 66 BPM    Atrial Rate 66 BPM    P-R Interval 130 ms    QRS Duration 92 ms    Q-T Interval 404 ms    QTC Calculation (Bezet) 423 ms    Calculated P Axis 81 degrees    Calculated R Axis 74 degrees    Calculated T Axis 47 degrees    Diagnosis       Normal sinus rhythm  Normal ECG  When compared with ECG of 07-SEP-2020 19:44,  No significant change was found  Confirmed by Letty Seth MD, Raji ([id number]) on 9/8/2020 8:27:46 AM     GLUCOSE, POC    Collection Time: 09/08/20 12:46 AM   Result Value Ref Range    Glucose (POC) 122 (H) 70 - 110 mg/dL   CBC WITH AUTOMATED DIFF    Collection Time: 09/08/20  4:18 AM   Result Value Ref Range    WBC 13.3 (H) 4.6 - 13.2 K/uL    RBC 4.50 4. 20 - 5.30 M/uL    HGB 13.3 12.0 - 16.0 g/dL    HCT 39.9 35.0 - 45.0 %    MCV 88.7 74.0 - 97.0 FL    MCH 29.6 24.0 - 34.0 PG    MCHC 33.3 31.0 - 37.0 g/dL    RDW 13.7 11.6 - 14.5 %    PLATELET 789 695 - 588 K/uL    MPV 11.0 9.2 - 11.8 FL    NEUTROPHILS 93 (H) 40 - 73 %    LYMPHOCYTES 6 (L) 21 - 52 %    MONOCYTES 1 (L) 3 - 10 %    EOSINOPHILS 0 0 - 5 % BASOPHILS 0 0 - 2 %    ABS. NEUTROPHILS 12.4 (H) 1.8 - 8.0 K/UL    ABS. LYMPHOCYTES 0.8 (L) 0.9 - 3.6 K/UL    ABS. MONOCYTES 0.1 0.05 - 1.2 K/UL    ABS. EOSINOPHILS 0.0 0.0 - 0.4 K/UL    ABS. BASOPHILS 0.0 0.0 - 0.1 K/UL    DF AUTOMATED     METABOLIC PANEL, BASIC    Collection Time: 09/08/20  4:18 AM   Result Value Ref Range    Sodium 139 136 - 145 mmol/L    Potassium 4.9 3.5 - 5.5 mmol/L    Chloride 105 100 - 111 mmol/L    CO2 27 21 - 32 mmol/L    Anion gap 7 3.0 - 18 mmol/L    Glucose 117 (H) 74 - 99 mg/dL    BUN 19 (H) 7.0 - 18 MG/DL    Creatinine 0.94 0.6 - 1.3 MG/DL    BUN/Creatinine ratio 20 12 - 20      GFR est AA >60 >60 ml/min/1.73m2    GFR est non-AA >60 >60 ml/min/1.73m2    Calcium 9.0 8.5 - 10.1 MG/DL   HEPATIC FUNCTION PANEL    Collection Time: 09/08/20  4:18 AM   Result Value Ref Range    Protein, total 7.5 6.4 - 8.2 g/dL    Albumin 3.9 3.4 - 5.0 g/dL    Globulin 3.6 2.0 - 4.0 g/dL    A-G Ratio 1.1 0.8 - 1.7      Bilirubin, total 0.4 0.2 - 1.0 MG/DL    Bilirubin, direct 0.1 0.0 - 0.2 MG/DL    Alk.  phosphatase 87 45 - 117 U/L    AST (SGOT) 22 10 - 38 U/L    ALT (SGPT) 23 13 - 56 U/L   LD    Collection Time: 09/08/20  4:18 AM   Result Value Ref Range     (H) 81 - 234 U/L   FERRITIN    Collection Time: 09/08/20  4:18 AM   Result Value Ref Range    Ferritin 52 8 - 388 NG/ML   C REACTIVE PROTEIN, QT    Collection Time: 09/08/20  4:18 AM   Result Value Ref Range    C-Reactive protein 0.8 (H) 0 - 0.3 mg/dL   D DIMER    Collection Time: 09/08/20  4:18 AM   Result Value Ref Range    D DIMER 0.31 <0.46 ug/ml(FEU)   PROCALCITONIN    Collection Time: 09/08/20  4:18 AM   Result Value Ref Range    Procalcitonin <0.05 ng/mL   CK-MB,QT    Collection Time: 09/08/20  4:18 AM   Result Value Ref Range    CK - MB 1.7 <3.6 ng/ml    CK-MB Index 2.2 0.0 - 4.0 %   CK    Collection Time: 09/08/20  4:18 AM   Result Value Ref Range    CK 76 26 - 192 U/L   TROPONIN I    Collection Time: 09/08/20  4:18 AM   Result Value Ref Range    Troponin-I, QT <0.02 0.0 - 0.045 NG/ML   EKG, 12 LEAD, SUBSEQUENT    Collection Time: 09/08/20  1:26 PM   Result Value Ref Range    Ventricular Rate 78 BPM    Atrial Rate 78 BPM    P-R Interval 130 ms    QRS Duration 86 ms    Q-T Interval 382 ms    QTC Calculation (Bezet) 435 ms    Calculated P Axis 79 degrees    Calculated R Axis 73 degrees    Calculated T Axis 66 degrees    Diagnosis       Normal sinus rhythm  Nonspecific T wave abnormality  Abnormal ECG  When compared with ECG of 08-SEP-2020 00:17,  Nonspecific T wave abnormality, worse in Lateral leads  Confirmed by David Gtz MD, Silvio Keating ([id number]) on 9/8/2020 1:58:12 PM       Additional Data Reviewed:      Signed By: Corinne Cuba MD     September 8, 2020

## 2020-09-08 NOTE — PROGRESS NOTES
Problem: Falls - Risk of  Goal: *Absence of Falls  Description: Document Tish Aquino Fall Risk and appropriate interventions in the flowsheet.   Outcome: Progressing Towards Goal  Note: Fall Risk Interventions:            Medication Interventions: Patient to call before getting OOB, Teach patient to arise slowly                   Problem: Patient Education: Go to Patient Education Activity  Goal: Patient/Family Education  Outcome: Progressing Towards Goal

## 2020-09-08 NOTE — H&P
History & Physical    Patient: Eleanor Garcia MRN: [id number]  CSN: [id number]    YOB: 1965  Age: 63 y.o. Sex: female      DOA: 9/7/2020  CC:    PCP: Ena Carter NP       HPI:     Eleanor Garcia is a 63 y.o. female who presents with SOB. Patient was admitted earlier today but decided to leave AMA. H&P completed by Dr. Brandon Gonsales prior to patient leaving AMA. She left to tend to personal matters at home and returned to complete admission. Eleanor Garcia is a 63 y.o. female with medical co-morbidities including HTN, CAD, chronic systolic CHF, severe COPD, severe persistent asthma, chronic hypoxic respiratory failure, MY on CPAP, GERD, CKD3, hyperlipidemia, h/o polysubstance abuse, presented with worsening cough and shortness of breath. Apparently, she ran out of most of her medications including respiratory and cardiac medications in the last weeks, and has developed cough and more shortness of breath on the last few days. She denies fever or recent sick contact. She stated that \"this thing\" happens every year and she was seen in the ER yesterday where she was discharged with steroid and oral antibiotics. She has not obtained this medications from pharmacy. She has chest pain with deep breathing. She has not use her CPAP due to concern for mold in her machine. She remains on 2-3 liters oxygen at baseline. No leg swelling. In the ER, she was found with shortness of breath and using accessory muscles. She was found to have worsening hypoxia on exertion. No fever. CXR without infiltrate. No evidence of overt heart failure. She was given solumedrol and azithromycin and neb treatment. COVID-19 test was sent.         Past Medical History:   Diagnosis Date    CHF (congestive heart failure) (HCC)     Chronic respiratory failure with hypoxia ([facility] 9/7/2020    CKD (chronic kidney disease) stage 3, GFR 30-59 ml/min ([facility] 10/31/2019    Cocaine abuse ([facility] 11/26/2017    COPD (chronic obstructive pulmonary disease) with chronic bronchitis ([address] 12/19/2017    Dependence on supplemental oxygen     Depression 3/22/2020    Drug-seeking behavior 11/19/2016    Endocrine disease     thyroid issues    Essential hypertension 3/10/2017    Fe deficiency anemia 10/27/2012    Fibromyalgia 12/16/2016    Gastroesophageal reflux disease without esophagitis 10/10/2016    Gastrointestinal disorder     \"blockage in my stomach\"    Hypercholesterolemia     Hypertension     Hypertensive heart failure ([address] 12/19/2017    Morbid obesity due to excess calories ([address] 3/10/2017    NSTEMI (non-ST elevated myocardial infarction) ([address] 3/22/2020    On home O2 12/3/2015    Overview:  2L at home per pt for approx 8 years    Polysubstance abuse ([address] 9/15/2018    Respiratory failure ([address] 1/11/2017    S/P hernia repair 10/31/2019    Sleep apnea 12/19/2017    T wave inversion in EKG 3/9/2019    Tobacco use 4/91/4613    Uncomplicated severe persistent asthma 12/13/2016    Vocal cord disease 12/7/2016       Past Surgical History:   Procedure Laterality Date    HX GI      Exploratory laparotomy with lysis of adhesions and primary repair of incarcerated umbilical hernia       History reviewed. No pertinent family history. Social History     Socioeconomic History    Marital status: SINGLE     Spouse name: Not on file    Number of children: Not on file    Years of education: Not on file    Highest education level: Not on file   Tobacco Use    Smoking status: Current Every Day Smoker     Packs/day: 0.25    Smokeless tobacco: Current User   Substance and Sexual Activity    Alcohol use: No     Comment: socially    Drug use: Not Currently     Types: Heroin    Sexual activity: Not Currently     Comment: last used 9 years ago       Prior to Admission medications    Medication Sig Start Date End Date Taking?  Authorizing Provider   citalopram (CeleXA) 20 mg tablet 1 tablet 12/17/18   Provider, Historical gemfibroziL (LOPID) 600 mg tablet 1 tablet 7/24/19   Provider, Historical   hydroCHLOROthiazide (HYDRODIURIL) 25 mg tablet 1 tablet in the morning    Provider, Historical   hydrOXYzine HCL (ATARAX) 50 mg tablet 1 tablet as needed    Provider, Historical   isosorbide mononitrate ER (IMDUR) 30 mg tablet Take 30 mg by mouth daily. 8/8/20   Provider, Historical   lisinopriL (PRINIVIL, ZESTRIL) 20 mg tablet 1 tablet 2/8/19   Provider, Historical   sucralfate (CARAFATE) 100 mg/mL suspension Take 1,000 mg by mouth. 12/17/18   Provider, Historical   QUEtiapine (SEROquel) 50 mg tablet TAKE 1 TABLET BY MOUTH AT BEDTIME FOR MOOD 7/5/20   Provider, Historical   albuterol-ipratropium (DUO-NEB) 2.5 mg-0.5 mg/3 ml nebu 3 mL by Nebulization route every four (4) hours as needed for Wheezing. 9/6/20   Tony Desai MD   predniSONE (DELTASONE) 50 mg tablet Take 1 Tab by mouth daily for 5 days. 9/6/20 9/11/20  Tony Desai MD   doxycycline (VIBRA-TABS) 100 mg tablet Take 1 Tab by mouth two (2) times a day for 7 days. 9/6/20 9/13/20  Tony Desai MD   albuterol sulfate 90 mcg/actuation aebs Take 2 Puffs by inhalation every four to six (4-6) hours as needed for Wheezing. 7/28/20   Paty Drew MD   furosemide (Lasix) 20 mg tablet Take 1 Tab by mouth daily. 7/20/20   Ad Riggs MD   cloNIDine HCL (CATAPRES) 0.2 mg tablet Take 1 Tab by mouth two (2) times a day. 6/22/20   Nick Zarate MD   amLODIPine (NORVASC) 10 mg tablet Take 1 Tab by mouth daily. 6/22/20   Nick Zarate MD   ALPRAZolam (Xanax) 0.5 mg tablet Take 1 Tab by mouth every eight (8) hours as needed for Anxiety. Max Daily Amount: 1.5 mg. 6/16/20   Karime Johnson MD   aspirin delayed-release 81 mg tablet Take 1 Tab by mouth daily. 3/27/20   Seema Naylor MD   famotidine (PEPCID) 20 mg tablet Take 1 Tab by mouth daily. 3/26/20   Seema Naylor MD   OXYGEN-AIR DELIVERY SYSTEMS 3 L by IntraNASal route as needed for Other (sob). Provider, Angie   docusate sodium (COLACE) 100 mg capsule Take 1 Cap by mouth two (2) times a day. 10/17/19   Anjali Clayton MD   fluticasone furoate-vilanterol (BREO ELLIPTA) 200-25 mcg/dose inhaler Take 1 Puff by inhalation daily. Other, MD Shalini   arformoterol (BROVANA) 15 mcg/2 mL nebu neb solution 2 mL by Nebulization route two (2) times a day. 3/14/19   Andrew Samuels MD   budesonide (PULMICORT) 0.5 mg/2 mL nbsp 2 mL by Nebulization route two (2) times a day. 3/14/19   Andrew Samuels MD   tiotropium (SPIRIVA) 18 mcg inhalation capsule Take 1 Cap by inhalation daily. 3/14/19   Andrew Samuels MD   atorvastatin (LIPITOR) 20 mg tablet Take 1 Tab by mouth nightly. 3/14/19   Andrew Samuels MD   metoprolol tartrate (LOPRESSOR) 25 mg tablet Take 0.5 Tabs by mouth every twelve (12) hours. 3/14/19   Andrew Samuels MD   roflumilast (DALIRESP) 500 mcg tab tablet Take 1 Tab by mouth daily. 3/15/19   Andrew Samuels MD   naloxone [facility]) 4 mg/actuation nasal spray Use 1 spray intranasally, then discard. Repeat with new spray every 2 min as needed for opioid overdose symptoms, alternating nostrils. 3/14/19   Andrew Samuels MD   montelukast (SINGULAIR) 10 mg tablet Take 1 Tab by mouth nightly.  3/9/17   Kee Byrd MD       No Known Allergies           Physical Exam:      Visit Vitals  /69 (BP 1 Location: Left arm, BP Patient Position: At rest)   Pulse 69   Temp 97.8 °F (36.6 °C)   Resp 22   Wt 71.2 kg (157 lb)   SpO2 99%   BMI 31.71 kg/m²       Physical Exam:  General:  Alert, NAD, speaking in short sentences while at rest   HEENT: PERRL, EOMI, supple neck, no JVD  Cardiovascular:  tachycardia  Pulmonary: expiratory wheeze t/o  GI:  +BS in all four quadrants, soft, non-tender  Extremities:  Trace pedal edema  Neuro: alert and oriented x3, GRAHAM    Lab/Data Review:  Labs: Results:       Chemistry Recent Labs     09/07/20 1955 09/07/20  0645 09/06/20  0150   * 143* 138*    142 143   K 4.6 3.5 2.8*    110 114*   CO2 25 27 24   BUN 13 12 20*   CREA 1.07 0.86 1.20   CA 9.2 8.7 8.1*   AGAP 7 5 5   BUCR 12 14 17   AP 86  --  85   TP 7.1  --  6.9   ALB 3.8  --  3.4   GLOB 3.3  --  3.5   AGRAT 1.2  --  1.0      CBC w/Diff Recent Labs     09/07/20 1955 09/07/20  0645 09/06/20  0150   WBC 11.9 9.9 8.9   RBC 4.23 3.72* 4.02*   HGB 12.4 10.8* 11.7*   HCT 37.3 32.6* 35.6    188 168   GRANS 94* 77* 81*   LYMPH 5* 18* 13*   EOS 0 1 2      Coagulation No results for input(s): PTP, INR, APTT, INREXT in the last 72 hours. Iron/Ferritin No results for input(s): IRON in the last 72 hours. No lab exists for component: TIBCCALC   BNP No results for input(s): BNPP in the last 72 hours. Cardiac Enzymes Recent Labs     09/07/20  1005 09/07/20  0645   CPK 68 70   CKND1 3.2 2.7      Liver Enzymes Recent Labs     09/07/20 1955   TP 7.1   ALB 3.8   AP 86      Thyroid Studies Lab Results   Component Value Date/Time    TSH 0.18 (L) 09/27/2019 10:50 PM          All Micro Results     None          Imaging Reviewed:  XR Results (most recent):  Results from Hospital Encounter encounter on 09/07/20   XR CHEST PORT    Narrative PORTABLE CHEST X-RAY    CPT CODE: 46682     INDICATION: Chest pain. Shortness of breath. .    COMPARISON: Prior exam 9/6/2020. FINDINGS:   Heart size borderline enlarged, similar. Aortic arch not enlarged. Similar  central pulmonary vascular engorgement. Since the prior exam there has been slight interval improvement in degree of  hazy bilateral perihilar opacities which suggests improving edema. No pneumothorax appreciated. No overt pulmonary edema. No focal infiltrate or consolidation. No significant effusion on portable AP  image. Impression IMPRESSION:  1. No evidence of acute cardiopulmonary disease. Improved pulmonary edema. Assessment:   1. Acute exacerbation of COPD with asthma  2. Chronic respiratory failure with hypoxia  3. Acute bronchitis  4.  Suspected COVID-19 virus infection  5. Tachycardia  6. HTN  7. CAD  8. Chronic systolic HF  9. HLD  10. MY  11. Normocytic anemia, possibly iron deficiency anemia  12. GERD  13. History of polysubstance abuse  14. Depression/Anxiety          Plan:   1. COVID test pending. Monitor inflammatory markers. Droplet precautions. Hold CPAP use for now. ABG ordered. Vitamin c/d, zinc  2. IV solumedrol, duoneb, budesonide, aformoterol neb  3. Continue IV azithromycin  4. Resume home medications with exceptions. Hold SSRI while on azithromycin   5. Pepcid and lovenox  6. UDS pending  7.  Code status full code            [facility], NP  9/7/2020, 9:31 PM

## 2020-09-08 NOTE — ED NOTES
Patient insists on taking off the cardiac monitor. Explained to patient the importance. Patient verbalized understanding. Patient refuses to put on pulse ox. Patient states \" I am sick of this shit all up on me. \" Provided perineal care. Bed changed. Patient resting comfortably on the stretcher with no obvious signs of distress noted.

## 2020-09-08 NOTE — PROGRESS NOTES
Returned to bedside for the third time to attempt ABG order. Pt continues to refuse blood draw. Dr Mcallister/ED provider's notified.

## 2020-09-08 NOTE — PROGRESS NOTES
Reason for Admission:   Acute respiratory distress [R06.03]  COPD exacerbation (HCC) [J44.1]  Acute bronchitis [J20.9]  Suspected COVID-19 virus infection [Z20.828]               RUR Score:     94             Resources/supports as identified by patient/family:       Top Challenges facing patient (as identified by patient/family and CM):     Pt lives alone    Finances/Medication cost?     No   Transportation      Medicaid van/daughter/friend  Support system or lack thereof? Daughter, friends  Living arrangements? Lives alone   Self-care/ADLs/Cognition? Needs assistance with ADLs        Current Advanced Directive/Advance Care Plan:   no                          Plan for utilizing home health: To be determined                      Likelihood of readmission:   HIGH    Transition of Care Plan:                    Initial assessment completed with patient. Cognitive status of patient: oriented to time, place, person and situation. Face sheet information confirmed:  yes. The patient designates her daughter Nicole Nagel [id number] to participate in her discharge plan and to receive any needed information. This patient lives in a  Home alone . Patient is not able to navigate steps as needed. Prior to hospitalization, patient was considered to be independent with ADLs/IADLS : yes . If not independent,  patient needs assist with : ADLs/IADLs. Per pt, her daughter comes in to help her. Patient has a current ACP document on file: no  The Medicaid transportation will be available to transport patient home upon discharge. The patient already has Edy Ahr, W/C, and 2L oxygen supplied by [employer] medical equipment available in the home. Patient is not currently active with home health. Patient has not stayed in a skilled nursing facility or rehab. Was  stay within last 60 days : no.       This patient is on dialysis :no   Currently, the discharge plan is to be determined    The patient states that she can obtain her medications from the pharmacy, and take her medications as directed. Patient's current insurance is [facility]      Care Management Interventions  PCP Verified by CM:  Yes  Mode of Transport at Discharge: [address] (CM Consult): Discharge Planning  Current Support Network: Lives Alone, Family Lives Nearby  Confirm Follow Up Transport: Self  Discharge Location  Discharge Placement: Unable to determine at this time        LINDSEY Dumont RN  Care Management  Pager: [phone]

## 2020-09-08 NOTE — ED NOTES
Patient opted to be removed from the BIPAP. Explained to patient risks associated. Patient is alert and oriented x 4. No respiratory distress noted. Provider notified.

## 2020-09-08 NOTE — PROGRESS NOTES
Pt continues to refuse ABG. Dr Mcclure Mel aware. He asked that I place pt on bipap to assist with breathing.   Call bell within reach

## 2020-09-08 NOTE — ED NOTES
Patient resting comfortably on stretcher. Patient denies chest pain, abdominal pain, nausea, vomiting, and diarrhea. Patient A&O x4. Side rails up x2. Call bell within reach.

## 2020-09-08 NOTE — ED PROVIDER NOTES
EMERGENCY DEPARTMENT HISTORY AND PHYSICAL EXAM    8:41 PM      Date: 9/7/2020  Patient Name: Meera Gaitan    History of Presenting Illness     Chief Complaint   Patient presents with    Shortness of Breath         History Provided By: Patient  Location/Duration/Severity/Modifying factors   HPI   48-year-old female with a PMH of CHF, asthma, COPD on 3 L NC, thyroid disease, HTN, HLD who presents to the ED with shortness of breath and wheezing, cough. Patient recently left this hospital AMA approximately 4-5 hours ago in order to [facility] her door\". Prior to leaving AMA. Patient had been diagnosed with COPD exacerbation and was going to be admitted to the hospitalist.  Patient states that she has been having worsening shortness of breath for approximately 1 month associated with wheezing, her home medications have not been helping, only able to walk a few steps before having to rest.  Approximately 1 week ago patient caught a \"cold \", which she thinks may have contributed to her worsening shortness of breath. Presented to this hospital on Saturday for treatment of her cold, was discharged with antibiotics; however, was unable to pick them up from pharmacy. And then re-presented on Sunday. Patient states that since leaving this hospital a few hours ago, her shortness of breath has significantly worsened. Also endorses bilateral lower extremity swelling, bilateral lower chest wall tenderness secondary to \"working hard to breathe\". Denies sick contacts. PCP: Silvia Ordonez NP    Current Outpatient Medications   Medication Sig Dispense Refill    citalopram (CeleXA) 20 mg tablet 1 tablet      gemfibroziL (LOPID) 600 mg tablet 1 tablet      hydroCHLOROthiazide (HYDRODIURIL) 25 mg tablet 1 tablet in the morning      hydrOXYzine HCL (ATARAX) 50 mg tablet 1 tablet as needed      isosorbide mononitrate ER (IMDUR) 30 mg tablet Take 30 mg by mouth daily.       lisinopriL (PRINIVIL, ZESTRIL) 20 mg tablet 1 tablet  sucralfate (CARAFATE) 100 mg/mL suspension Take 1,000 mg by mouth.  QUEtiapine (SEROquel) 50 mg tablet TAKE 1 TABLET BY MOUTH AT BEDTIME FOR MOOD      albuterol-ipratropium (DUO-NEB) 2.5 mg-0.5 mg/3 ml nebu 3 mL by Nebulization route every four (4) hours as needed for Wheezing. 30 Nebule 0    predniSONE (DELTASONE) 50 mg tablet Take 1 Tab by mouth daily for 5 days. 5 Tab 0    doxycycline (VIBRA-TABS) 100 mg tablet Take 1 Tab by mouth two (2) times a day for 7 days. 14 Tab 0    albuterol sulfate 90 mcg/actuation aebs Take 2 Puffs by inhalation every four to six (4-6) hours as needed for Wheezing. 1 Inhaler 0    furosemide (Lasix) 20 mg tablet Take 1 Tab by mouth daily. 5 Tab 0    cloNIDine HCL (CATAPRES) 0.2 mg tablet Take 1 Tab by mouth two (2) times a day. 30 Tab 1    amLODIPine (NORVASC) 10 mg tablet Take 1 Tab by mouth daily. 15 Tab 0    ALPRAZolam (Xanax) 0.5 mg tablet Take 1 Tab by mouth every eight (8) hours as needed for Anxiety. Max Daily Amount: 1.5 mg. 20 Tab 0    aspirin delayed-release 81 mg tablet Take 1 Tab by mouth daily. 30 Tab 0    famotidine (PEPCID) 20 mg tablet Take 1 Tab by mouth daily. 30 Tab 1    OXYGEN-AIR DELIVERY SYSTEMS 3 L by IntraNASal route as needed for Other (sob).  docusate sodium (COLACE) 100 mg capsule Take 1 Cap by mouth two (2) times a day. 120 Cap 0    fluticasone furoate-vilanterol (BREO ELLIPTA) 200-25 mcg/dose inhaler Take 1 Puff by inhalation daily.  arformoterol (BROVANA) 15 mcg/2 mL nebu neb solution 2 mL by Nebulization route two (2) times a day. 60 Vial 0    budesonide (PULMICORT) 0.5 mg/2 mL nbsp 2 mL by Nebulization route two (2) times a day. 60 Each 0    tiotropium (SPIRIVA) 18 mcg inhalation capsule Take 1 Cap by inhalation daily. 30 Cap 0    atorvastatin (LIPITOR) 20 mg tablet Take 1 Tab by mouth nightly. 30 Tab 0    metoprolol tartrate (LOPRESSOR) 25 mg tablet Take 0.5 Tabs by mouth every twelve (12) hours.  30 Tab 0    roflumilast (DALIRESP) 500 mcg tab tablet Take 1 Tab by mouth daily. 30 Tab 0    naloxone (NARCAN) 4 mg/actuation nasal spray Use 1 spray intranasally, then discard. Repeat with new spray every 2 min as needed for opioid overdose symptoms, alternating nostrils. 1 Each 0    montelukast (SINGULAIR) 10 mg tablet Take 1 Tab by mouth nightly. 30 Tab 1       Past History     Past Medical History:  Past Medical History:   Diagnosis Date    CH[facility]congestive heart failure) ([facility])     Chronic respiratory failure with hypoxia ([address] 9/7/2020    CKD (chronic kidney disease) stage 3, GFR 30-59 ml/min ([address] 10/31/2019    Cocaine abuse ([address] 11/26/2017    COPD (chronic obstructive pulmonary disease) with chronic bronchitis ([address] 12/19/2017    Dependence on supplemental oxygen     Depression 3/22/2020    Drug-seeking behavior 11/19/2016    Endocrine disease     thyroid issues    Essential hypertension 3/10/2017    Fe deficiency anemia 10/27/2012    Fibromyalgia 12/16/2016    Gastroesophageal reflux disease without esophagitis 10/10/2016    Gastrointestinal disorder     \"blockage in my stomach\"    Hypercholesterolemia     Hypertension     Hypertensive heart failure ([address] 12/19/2017    Morbid obesity due to excess calories ([address] 3/10/2017    NSTEMI (non-ST elevated myocardial infarction) ([address] 3/22/2020    On home O2 12/3/2015    Overview:  2L at home per pt for approx 8 years    Polysubstance abuse ([address] 9/15/2018    Respiratory failure ([address] 1/11/2017    S/P hernia repair 10/31/2019    Sleep apnea 12/19/2017    T wave inversion in EKG 3/9/2019    Tobacco use 0/04/3377    Uncomplicated severe persistent asthma 12/13/2016    Vocal cord disease 12/7/2016       Past Surgical History:  Past Surgical History:   Procedure Laterality Date    HX GI      Exploratory laparotomy with lysis of adhesions and primary repair of incarcerated umbilical hernia       Family History:  History reviewed.  No pertinent family history. Social History:  Social History     Tobacco Use    Smoking status: Current Every Day Smoker     Packs/day: 0.25    Smokeless tobacco: Current User   Substance Use Topics    Alcohol use: No     Comment: socially    Drug use: Not Currently     Types: Heroin       Allergies:  No Known Allergies      Review of Systems       Review of Systems   Constitutional: Negative for chills, diaphoresis and fever. Eyes: Negative for visual disturbance. Respiratory: Positive for cough, shortness of breath and wheezing. Cardiovascular: Positive for leg swelling. Negative for chest pain. Gastrointestinal: Negative for abdominal pain, constipation, diarrhea, nausea and vomiting. Genitourinary: Negative for dysuria. Musculoskeletal: Negative for joint swelling. Skin: Negative for rash. Neurological: Negative for facial asymmetry and headaches. Psychiatric/Behavioral: Negative for confusion and decreased concentration. Physical Exam     Visit Vitals  /69 (BP 1 Location: Left arm, BP Patient Position: At rest)   Pulse 69   Temp 97.8 °F (36.6 °C)   Resp 22   Wt 71.2 kg (157 lb)   LMP 04/14/2009   SpO2 99%   BMI 31.71 kg/m²         Physical Exam  Vitals signs and nursing note reviewed. Constitutional:       Comments: Older woman sitting in wheelchair with nasal cannula on home oxygen tank, able to speak 1 sentence before needing to catch her breath. HENT:      Head: Normocephalic and atraumatic. Eyes:      Extraocular Movements: Extraocular movements intact. Pupils: Pupils are equal, round, and reactive to light. Neck:      Vascular: No JVD. Cardiovascular:      Rate and Rhythm: Normal rate and regular rhythm. Heart sounds: No murmur. No friction rub. No gallop. Pulmonary:      Effort: Accessory muscle usage (Minimal) present. Breath sounds: No stridor.  Decreased breath sounds (Bilaterally, throughout all lung fields) and wheezing (Bilaterally, throughout all lung fields) present. No rhonchi or rales. Chest:      Chest wall: No tenderness. Abdominal:      Palpations: Abdomen is soft. Tenderness: There is no abdominal tenderness. There is no guarding or rebound. Musculoskeletal:      Right lower leg: No edema. Left lower leg: No edema. Skin:     General: Skin is warm and dry. Neurological:      General: No focal deficit present. Mental Status: She is alert and oriented to person, place, and time.    Psychiatric:         Mood and Affect: Mood normal.         Behavior: Behavior normal.           Diagnostic Study Results     Labs -  Recent Results (from the past 12 hour(s))   EKG, 12 LEAD, SUBSEQUENT    Collection Time: 09/07/20  9:01 AM   Result Value Ref Range    Ventricular Rate 83 BPM    Atrial Rate 83 BPM    P-R Interval 138 ms    QRS Duration 140 ms    Q-T Interval 450 ms    QTC Calculation (Bezet) 528 ms    Calculated P Axis 90 degrees    Calculated R Axis -23 degrees    Calculated T Axis 70 degrees    Diagnosis       Normal sinus rhythm  Possible Left atrial enlargement  Nonspecific intraventricular block  Cannot rule out Anteroseptal infarct (cited on or before 07-SEP-2020)  Abnormal ECG  When compared with ECG of 07-SEP-2020 06:37,  Significant changes have occurred  Confirmed by Sofie Bosch MD, Travis Houston ([id number]) on 9/7/2020 1:55:37 PM     CARDIAC PANEL,(CK, CKMB & TROPONIN)    Collection Time: 09/07/20 10:05 AM   Result Value Ref Range    CK - MB 2.2 <3.6 ng/ml    CK-MB Index 3.2 0.0 - 4.0 %    CK 68 26 - 192 U/L    Troponin-I, QT <0.02 0.0 - 5.962 NG/ML   METABOLIC PANEL, COMPREHENSIVE    Collection Time: 09/07/20  7:55 PM   Result Value Ref Range    Sodium 139 136 - 145 mmol/L    Potassium 4.6 3.5 - 5.5 mmol/L    Chloride 107 100 - 111 mmol/L    CO2 25 21 - 32 mmol/L    Anion gap 7 3.0 - 18 mmol/L    Glucose 146 (H) 74 - 99 mg/dL    BUN 13 7.0 - 18 MG/DL    Creatinine 1.07 0.6 - 1.3 MG/DL    BUN/Creatinine ratio 12 12 - 20      GFR est AA >60 >60 ml/min/1.73m2    GFR est non-AA 53 (L) >60 ml/min/1.73m2    Calcium 9.2 8.5 - 10.1 MG/DL    Bilirubin, total 0.3 0.2 - 1.0 MG/DL    ALT (SGPT) 20 13 - 56 U/L    AST (SGOT) 8 (L) 10 - 38 U/L    Alk. phosphatase 86 45 - 117 U/L    Protein, total 7.1 6.4 - 8.2 g/dL    Albumin 3.8 3.4 - 5.0 g/dL    Globulin 3.3 2.0 - 4.0 g/dL    A-G Ratio 1.2 0.8 - 1.7     CBC WITH AUTOMATED DIFF    Collection Time: 09/07/20  7:55 PM   Result Value Ref Range    WBC 11.9 4.6 - 13.2 K/uL    RBC 4.23 4.20 - 5.30 M/uL    HGB 12.4 12.0 - 16.0 g/dL    HCT 37.3 35.0 - 45.0 %    MCV 88.2 74.0 - 97.0 FL    MCH 29.3 24.0 - 34.0 PG    MCHC 33.2 31.0 - 37.0 g/dL    RDW 13.8 11.6 - 14.5 %    PLATELET 488 769 - 426 K/uL    MPV 10.4 9.2 - 11.8 FL    NEUTROPHILS 94 (H) 40 - 73 %    LYMPHOCYTES 5 (L) 21 - 52 %    MONOCYTES 1 (L) 3 - 10 %    EOSINOPHILS 0 0 - 5 %    BASOPHILS 0 0 - 2 %    ABS. NEUTROPHILS 11.2 (H) 1.8 - 8.0 K/UL    ABS. LYMPHOCYTES 0.6 (L) 0.9 - 3.6 K/UL    ABS. MONOCYTES 0.1 0.05 - 1.2 K/UL    ABS. EOSINOPHILS 0.0 0.0 - 0.4 K/UL    ABS. BASOPHILS 0.0 0.0 - 0.1 K/UL    DF AUTOMATED         Radiologic Studies -   No orders to display         Medical Decision Making   I am the first provider for this patient. I reviewed the vital signs, available nursing notes, past medical history, past surgical history, family history and social history. Vital Signs-Reviewed the patient's vital signs. EKG: Normal sinus rhythm, HR 64, normal axis, normal intervals (, QRS 92, QTc 443), no ST elevations or depressions, biphasic T waves in V1. Records Reviewed: Nursing Notes, Old Medical Records, Previous electrocardiograms, Previous Radiology Studies and Previous Laboratory Studies (Time of Review: 8:41 PM)    ED Course: Progress Notes, Reevaluation, and Consults:     9:01 PM  Review of patient's chart demonstrates that she received DuoNebs x3, azithromycin 500 mg IV, methylprednisolone 125 mg, albuterol inhaler earlier today.   At this time, will give additional albuterol nebulizer treatment, ordered BiPAP, ABG. Patient swab for COVID this morning at 9 AM, results pending. Review of previous labs, states no leukocytosis, multiple negative troponins, no significant elevation of BNP, hemoglobin A1c 5.3. Initial CXR with pulmonary edema, improved on repeat this morning at 7 AM.    9:16 PM  Spoke with Dr. Pat Ramos, hosptialist, he will admit the patient. He requested ABG before starting BiPAP to ensure it is required. He will see patient in approximately 1 hour in the emergency department and will admit her. He also requested urinalysis, urine drug screen. These were ordered. Pt refused ABG x2. Ordered BiPAP. 10:26 PM  UA with glucosuria, glucose 100. UDS positive for methadone, cocaine, and opioids. Dr. Pat Ramos aware. Dr. Pat Ramos states he will place admission orders. Provider Notes (Medical Decision Making):   MDM  Number of Diagnoses or Management Options  Diagnosis management comments: 79-year-old female with history of COPD on home oxygen, asthma, CHF, HTN, HLD who presents to the ED with shortness of breath and wheezing. Patient was here earlier today, was going to be admitted for COPD exacerbation; however, left to go \"lock her front door. \"  Re-presents this evening, several hours later, with worsening shortness of breath, bilateral wheezing, and mild respiratory distress. Vital signs reassuring, aside from tachypnea, RR 22. Repeat labs with no acute findings. UDS, UA, ABG pending, will likely need BiPAP. To be admitted to hospitalist, location pending BiPAP requirement. Diagnosis     Clinical Impression:   1. COPD with acute exacerbation ([facility]    2.  Suspected COVID-19 virus infection        Disposition: Admitted to hospitalist    Follow-up Information    None          Patient's Medications   Start Taking    No medications on file   Continue Taking    ALBUTEROL SULFATE 90 MCG/ACTUATION AEBS    Take 2 Puffs by inhalation every four to six (4-6) hours as needed for Wheezing. ALBUTEROL-IPRATROPIUM (DUO-NEB) 2.5 MG-0.5 MG/3 ML NEBU    3 mL by Nebulization route every four (4) hours as needed for Wheezing. ALPRAZOLAM (XANAX) 0.5 MG TABLET    Take 1 Tab by mouth every eight (8) hours as needed for Anxiety. Max Daily Amount: 1.5 mg.    AMLODIPINE (NORVASC) 10 MG TABLET    Take 1 Tab by mouth daily. ARFORMOTEROL (BROVANA) 15 MCG/2 ML NEBU NEB SOLUTION    2 mL by Nebulization route two (2) times a day. ASPIRIN DELAYED-RELEASE 81 MG TABLET    Take 1 Tab by mouth daily. ATORVASTATIN (LIPITOR) 20 MG TABLET    Take 1 Tab by mouth nightly. BUDESONIDE (PULMICORT) 0.5 MG/2 ML NBSP    2 mL by Nebulization route two (2) times a day. CITALOPRAM (CELEXA) 20 MG TABLET    1 tablet    CLONIDINE HCL (CATAPRES) 0.2 MG TABLET    Take 1 Tab by mouth two (2) times a day. DOCUSATE SODIUM (COLACE) 100 MG CAPSULE    Take 1 Cap by mouth two (2) times a day. DOXYCYCLINE (VIBRA-TABS) 100 MG TABLET    Take 1 Tab by mouth two (2) times a day for 7 days. FAMOTIDINE (PEPCID) 20 MG TABLET    Take 1 Tab by mouth daily. FLUTICASONE FUROATE-VILANTEROL (BREO ELLIPTA) 200-25 MCG/DOSE INHALER    Take 1 Puff by inhalation daily. FUROSEMIDE (LASIX) 20 MG TABLET    Take 1 Tab by mouth daily. GEMFIBROZIL (LOPID) 600 MG TABLET    1 tablet    HYDROCHLOROTHIAZIDE (HYDRODIURIL) 25 MG TABLET    1 tablet in the morning    HYDROXYZINE HCL (ATARAX) 50 MG TABLET    1 tablet as needed    ISOSORBIDE MONONITRATE ER (IMDUR) 30 MG TABLET    Take 30 mg by mouth daily. LISINOPRIL (PRINIVIL, ZESTRIL) 20 MG TABLET    1 tablet    METOPROLOL TARTRATE (LOPRESSOR) 25 MG TABLET    Take 0.5 Tabs by mouth every twelve (12) hours. MONTELUKAST (SINGULAIR) 10 MG TABLET    Take 1 Tab by mouth nightly. NALOXONE (NARCAN) 4 MG/ACTUATION NASAL SPRAY    Use 1 spray intranasally, then discard.  Repeat with new spray every 2 min as needed for opioid overdose symptoms, alternating nostrils. OXYGEN-AIR DELIVERY SYSTEMS    3 L by IntraNASal route as needed for Other (sob). PREDNISONE (DELTASONE) 50 MG TABLET    Take 1 Tab by mouth daily for 5 days. QUETIAPINE (SEROQUEL) 50 MG TABLET    TAKE 1 TABLET BY MOUTH AT BEDTIME FOR MOOD    ROFLUMILAST (DALIRESP) 500 MCG TAB TABLET    Take 1 Tab by mouth daily. SUCRALFATE (CARAFATE) 100 MG/ML SUSPENSION    Take 1,000 mg by mouth. TIOTROPIUM (SPIRIVA) 18 MCG INHALATION CAPSULE    Take 1 Cap by inhalation daily. These Medications have changed    No medications on file   Stop Taking    No medications on file     Disclaimer: Sections of this note are dictated using utilizing voice recognition software. Minor typographical errors may be present. If questions arise, please do not hesitate to contact me or call our department.       Danyel Smith MD, PGY-2  [facility] Emergency Medicine PGY-2

## 2020-09-09 VITALS
RESPIRATION RATE: 18 BRPM | HEIGHT: 59 IN | OXYGEN SATURATION: 100 % | HEART RATE: 70 BPM | DIASTOLIC BLOOD PRESSURE: 86 MMHG | BODY MASS INDEX: 31.65 KG/M2 | WEIGHT: 157 LBS | TEMPERATURE: 97.6 F | SYSTOLIC BLOOD PRESSURE: 149 MMHG

## 2020-09-09 LAB
ALBUMIN SERPL-MCNC: 3.1 G/DL (ref 3.4–5)
ALBUMIN/GLOB SERPL: 0.8 {RATIO} (ref 0.8–1.7)
ALP SERPL-CCNC: 77 U/L (ref 45–117)
ALT SERPL-CCNC: 17 U/L (ref 13–56)
ANION GAP SERPL CALC-SCNC: 7 MMOL/L (ref 3–18)
AST SERPL-CCNC: 10 U/L (ref 10–38)
BASOPHILS # BLD: 0 K/UL (ref 0–0.1)
BASOPHILS NFR BLD: 0 % (ref 0–2)
BILIRUB DIRECT SERPL-MCNC: 0.1 MG/DL (ref 0–0.2)
BILIRUB SERPL-MCNC: 0.2 MG/DL (ref 0.2–1)
BUN SERPL-MCNC: 24 MG/DL (ref 7–18)
BUN/CREAT SERPL: 23 (ref 12–20)
CALCIUM SERPL-MCNC: 8.7 MG/DL (ref 8.5–10.1)
CHLORIDE SERPL-SCNC: 104 MMOL/L (ref 100–111)
CO2 SERPL-SCNC: 28 MMOL/L (ref 21–32)
CREAT SERPL-MCNC: 1.04 MG/DL (ref 0.6–1.3)
CRP SERPL-MCNC: <0.3 MG/DL (ref 0–0.3)
D DIMER PPP FEU-MCNC: 0.28 UG/ML(FEU)
DIFFERENTIAL METHOD BLD: ABNORMAL
EOSINOPHIL # BLD: 0 K/UL (ref 0–0.4)
EOSINOPHIL NFR BLD: 0 % (ref 0–5)
ERYTHROCYTE [DISTWIDTH] IN BLOOD BY AUTOMATED COUNT: 13.6 % (ref 11.6–14.5)
FERRITIN SERPL-MCNC: 41 NG/ML (ref 8–388)
GLOBULIN SER CALC-MCNC: 3.8 G/DL (ref 2–4)
GLUCOSE SERPL-MCNC: 104 MG/DL (ref 74–99)
HCT VFR BLD AUTO: 41.4 % (ref 35–45)
HGB BLD-MCNC: 13.7 G/DL (ref 12–16)
LDH SERPL L TO P-CCNC: 173 U/L (ref 81–234)
LYMPHOCYTES # BLD: 1 K/UL (ref 0.9–3.6)
LYMPHOCYTES NFR BLD: 8 % (ref 21–52)
MAGNESIUM SERPL-MCNC: 2.2 MG/DL (ref 1.6–2.6)
MCH RBC QN AUTO: 29.3 PG (ref 24–34)
MCHC RBC AUTO-ENTMCNC: 33.1 G/DL (ref 31–37)
MCV RBC AUTO: 88.7 FL (ref 74–97)
MONOCYTES # BLD: 0.4 K/UL (ref 0.05–1.2)
MONOCYTES NFR BLD: 3 % (ref 3–10)
NEUTS SEG # BLD: 11.4 K/UL (ref 1.8–8)
NEUTS SEG NFR BLD: 89 % (ref 40–73)
PLATELET # BLD AUTO: 240 K/UL (ref 135–420)
PMV BLD AUTO: 10.7 FL (ref 9.2–11.8)
POTASSIUM SERPL-SCNC: 4.2 MMOL/L (ref 3.5–5.5)
PROCALCITONIN SERPL-MCNC: 0.17 NG/ML
PROT SERPL-MCNC: 6.9 G/DL (ref 6.4–8.2)
RBC # BLD AUTO: 4.67 M/UL (ref 4.2–5.3)
SODIUM SERPL-SCNC: 139 MMOL/L (ref 136–145)
WBC # BLD AUTO: 12.9 K/UL (ref 4.6–13.2)

## 2020-09-09 PROCEDURE — 85379 FIBRIN DEGRADATION QUANT: CPT

## 2020-09-09 PROCEDURE — 80048 BASIC METABOLIC PNL TOTAL CA: CPT

## 2020-09-09 PROCEDURE — 74011250637 HC RX REV CODE- 250/637: Performed by: INTERNAL MEDICINE

## 2020-09-09 PROCEDURE — 85025 COMPLETE CBC W/AUTO DIFF WBC: CPT

## 2020-09-09 PROCEDURE — 36415 COLL VENOUS BLD VENIPUNCTURE: CPT

## 2020-09-09 PROCEDURE — 74011250636 HC RX REV CODE- 250/636: Performed by: INTERNAL MEDICINE

## 2020-09-09 PROCEDURE — 82728 ASSAY OF FERRITIN: CPT

## 2020-09-09 PROCEDURE — 83735 ASSAY OF MAGNESIUM: CPT

## 2020-09-09 PROCEDURE — 80076 HEPATIC FUNCTION PANEL: CPT

## 2020-09-09 PROCEDURE — 84145 PROCALCITONIN (PCT): CPT

## 2020-09-09 PROCEDURE — 83615 LACTATE (LD) (LDH) ENZYME: CPT

## 2020-09-09 PROCEDURE — 86140 C-REACTIVE PROTEIN: CPT

## 2020-09-09 RX ADMIN — METHYLPREDNISOLONE SODIUM SUCCINATE 40 MG: 40 INJECTION, POWDER, FOR SOLUTION INTRAMUSCULAR; INTRAVENOUS at 04:45

## 2020-09-09 RX ADMIN — ALBUTEROL SULFATE 2 PUFF: 108 INHALANT RESPIRATORY (INHALATION) at 02:00

## 2020-09-09 RX ADMIN — Medication 10 ML: at 04:45

## 2020-09-09 NOTE — PROGRESS NOTES
Waiting to setup up Bipap when patient moves to another room. Patient states she doesn't want to go on Bipap until much later.

## 2020-09-09 NOTE — PROGRESS NOTES
OT order received and chart reviewed.  Per chart review, pt left AMA prior to OT eval.    Thank you for this referral,  Adalgisa Patrick MS, OTR/L

## 2020-09-09 NOTE — PROGRESS NOTES
Problem: Falls - Risk of  Goal: *Absence of Falls  Outcome: Progressing Towards Goal  Note: Fall Risk Interventions:            Medication Interventions: Patient to call before getting OOB

## 2020-09-09 NOTE — PROGRESS NOTES
Pt refused 2100 accucheck stating that she doesnt need it. Also asked pt is ready to move to the other room so she can go on to the bipap and she also declined. Pt requested enema for constipation and then declined it after it was ordered. Attempted pt education however, pt is argumentative.  Will cont to monitor

## 2020-09-09 NOTE — PROGRESS NOTES
Pt is discharged via [facility]. Dr. Jae Manuel notified and nursing supervisor also notified. Pt states that was admitted and brought her own oxygen tank from home to hospital with her. However, there was no documentation of this anywhere. Security was alerted, ER asked about it and nursing supervisor also notified. Pt was escorted to door via wheelchair by 3N charge nurse Rebekah Horn RN with belongings. AMA paperwork signed by patient.

## 2020-09-09 NOTE — ROUTINE PROCESS
Called to pt room at 0641 this morning. Pt has mucus built up and pt states that she is having a hard time breathing. Pt administered 1 puff of inhaler and decided she wanted a neb treatment instead. Attempted to explain to pt the state of the floor and that all her neb treatments had been converted to MDI due to the nature of the floor and her not being in a neg/pos. Pressure room. Pt is upset at this but not in any clear resp. Distress; increased o2 for pt o2 is 100 on 2lnc but increased to 4L for pt comfort. Pt is also having a BM at the same time. Went to get supplies to clean pt up. Pt repeatedly pressing the call bell while gathering supplies. Went into pt room with supplies and pt begins yelling about if she cant the the treatment now she is going to leave. Cleaned pt up and informed charge nurse, house supervisor. Paged MD and RT. Pt still repeatedly pressing call bell. All other RNs are in shift change. Offered pt anxiety meds to help her calm down. Pt refused to take them . Pt requests AMA papers. Charge nurse informed and papers taken into room. Pt has calmed down and is speaking in a normal tone now. Attempted to re-educate pt on the importance of compliance ( pt refused bipap last night) and hospital policy. Pt agrees to stay and wait for MD. Report given to Katia Moreno.

## 2020-09-09 NOTE — PROGRESS NOTES
Problem: Falls - Risk of  Goal: *Absence of Falls  Description: Document Dana Hansen Fall Risk and appropriate interventions in the flowsheet.   Outcome: Progressing Towards Goal  Note: Fall Risk Interventions:            Medication Interventions: Patient to call before getting OOB                   Problem: Patient Education: Go to Patient Education Activity  Goal: Patient/Family Education  Outcome: Progressing Towards Goal

## 2020-09-10 LAB — SARS-COV-2, COV2NT: NOT DETECTED

## 2020-09-12 ENCOUNTER — APPOINTMENT (OUTPATIENT)
Dept: GENERAL RADIOLOGY | Age: 55
End: 2020-09-12
Attending: PHYSICIAN ASSISTANT
Payer: MEDICAID

## 2020-09-12 ENCOUNTER — HOSPITAL ENCOUNTER (EMERGENCY)
Age: 55
Discharge: HOME | End: 2020-09-12
Attending: EMERGENCY MEDICINE
Payer: MEDICAID

## 2020-09-12 VITALS
OXYGEN SATURATION: 99 % | TEMPERATURE: 98.5 F | SYSTOLIC BLOOD PRESSURE: 199 MMHG | RESPIRATION RATE: 19 BRPM | DIASTOLIC BLOOD PRESSURE: 72 MMHG | HEART RATE: 71 BPM

## 2020-09-12 DIAGNOSIS — J44.1 ACUTE EXACERBATION OF CHRONIC OBSTRUCTIVE PULMONARY DISEASE (COPD) (HCC): Primary | ICD-10-CM

## 2020-09-12 DIAGNOSIS — E87.6 HYPOKALEMIA: ICD-10-CM

## 2020-09-12 LAB
ANION GAP SERPL CALC-SCNC: 4 MMOL/L (ref 3–18)
BASOPHILS # BLD: 0 K/UL (ref 0–0.1)
BASOPHILS NFR BLD: 0 % (ref 0–2)
BUN SERPL-MCNC: 23 MG/DL (ref 7–18)
BUN/CREAT SERPL: 23 (ref 12–20)
CALCIUM SERPL-MCNC: 8 MG/DL (ref 8.5–10.1)
CHLORIDE SERPL-SCNC: 110 MMOL/L (ref 100–111)
CO2 SERPL-SCNC: 31 MMOL/L (ref 21–32)
CREAT SERPL-MCNC: 0.99 MG/DL (ref 0.6–1.3)
DIFFERENTIAL METHOD BLD: ABNORMAL
EOSINOPHIL # BLD: 0.2 K/UL (ref 0–0.4)
EOSINOPHIL NFR BLD: 2 % (ref 0–5)
ERYTHROCYTE [DISTWIDTH] IN BLOOD BY AUTOMATED COUNT: 13.2 % (ref 11.6–14.5)
GLUCOSE SERPL-MCNC: 118 MG/DL (ref 74–99)
HCT VFR BLD AUTO: 40.3 % (ref 35–45)
HGB BLD-MCNC: 13.4 G/DL (ref 12–16)
LYMPHOCYTES # BLD: 3.9 K/UL (ref 0.9–3.6)
LYMPHOCYTES NFR BLD: 38 % (ref 21–52)
MAGNESIUM SERPL-MCNC: 2.3 MG/DL (ref 1.6–2.6)
MCH RBC QN AUTO: 29.4 PG (ref 24–34)
MCHC RBC AUTO-ENTMCNC: 33.3 G/DL (ref 31–37)
MCV RBC AUTO: 88.4 FL (ref 74–97)
MONOCYTES # BLD: 0.7 K/UL (ref 0.05–1.2)
MONOCYTES NFR BLD: 6 % (ref 3–10)
NEUTS SEG # BLD: 5.7 K/UL (ref 1.8–8)
NEUTS SEG NFR BLD: 54 % (ref 40–73)
PLATELET # BLD AUTO: 254 K/UL (ref 135–420)
PMV BLD AUTO: 10 FL (ref 9.2–11.8)
POTASSIUM SERPL-SCNC: 3.4 MMOL/L (ref 3.5–5.5)
RBC # BLD AUTO: 4.56 M/UL (ref 4.2–5.3)
SODIUM SERPL-SCNC: 145 MMOL/L (ref 136–145)
TROPONIN I SERPL-MCNC: <0.02 NG/ML (ref 0–0.04)
WBC # BLD AUTO: 10.5 K/UL (ref 4.6–13.2)

## 2020-09-12 PROCEDURE — 80048 BASIC METABOLIC PNL TOTAL CA: CPT

## 2020-09-12 PROCEDURE — 93005 ELECTROCARDIOGRAM TRACING: CPT

## 2020-09-12 PROCEDURE — 83735 ASSAY OF MAGNESIUM: CPT

## 2020-09-12 PROCEDURE — 74011000250 HC RX REV CODE- 250: Performed by: PHYSICIAN ASSISTANT

## 2020-09-12 PROCEDURE — 85025 COMPLETE CBC W/AUTO DIFF WBC: CPT

## 2020-09-12 PROCEDURE — 74011000250 HC RX REV CODE- 250: Performed by: NURSE PRACTITIONER

## 2020-09-12 PROCEDURE — 99285 EMERGENCY DEPT VISIT HI MDM: CPT

## 2020-09-12 PROCEDURE — 84484 ASSAY OF TROPONIN QUANT: CPT

## 2020-09-12 PROCEDURE — 94640 AIRWAY INHALATION TREATMENT: CPT

## 2020-09-12 PROCEDURE — 74011636637 HC RX REV CODE- 636/637: Performed by: PHYSICIAN ASSISTANT

## 2020-09-12 PROCEDURE — 74011250637 HC RX REV CODE- 250/637: Performed by: PHYSICIAN ASSISTANT

## 2020-09-12 PROCEDURE — 71045 X-RAY EXAM CHEST 1 VIEW: CPT

## 2020-09-12 RX ORDER — ALBUTEROL SULFATE 0.83 MG/ML
5 SOLUTION RESPIRATORY (INHALATION)
Status: COMPLETED | OUTPATIENT
Start: 2020-09-12 | End: 2020-09-12

## 2020-09-12 RX ORDER — PREDNISONE 10 MG/1
TABLET ORAL
Qty: 1 PACKAGE | Refills: 0 | Status: SHIPPED | OUTPATIENT
Start: 2020-09-12 | End: 2020-09-14

## 2020-09-12 RX ORDER — ALBUTEROL SULFATE 90 UG/1
2 AEROSOL, METERED RESPIRATORY (INHALATION)
Qty: 1 INHALER | Refills: 0 | Status: SHIPPED | OUTPATIENT
Start: 2020-09-12 | End: 2020-09-14 | Stop reason: SDUPTHER

## 2020-09-12 RX ORDER — PREDNISONE 20 MG/1
60 TABLET ORAL
Status: COMPLETED | OUTPATIENT
Start: 2020-09-12 | End: 2020-09-12

## 2020-09-12 RX ORDER — IPRATROPIUM BROMIDE AND ALBUTEROL SULFATE 2.5; .5 MG/3ML; MG/3ML
3 SOLUTION RESPIRATORY (INHALATION)
Status: COMPLETED | OUTPATIENT
Start: 2020-09-12 | End: 2020-09-12

## 2020-09-12 RX ORDER — POTASSIUM CHLORIDE 20 MEQ/1
40 TABLET, EXTENDED RELEASE ORAL 2 TIMES DAILY
Status: DISCONTINUED | OUTPATIENT
Start: 2020-09-12 | End: 2020-09-12 | Stop reason: HOSPADM

## 2020-09-12 RX ORDER — IPRATROPIUM BROMIDE AND ALBUTEROL SULFATE 2.5; .5 MG/3ML; MG/3ML
3 SOLUTION RESPIRATORY (INHALATION)
Status: DISPENSED | OUTPATIENT
Start: 2020-09-12 | End: 2020-09-12

## 2020-09-12 RX ADMIN — IPRATROPIUM BROMIDE AND ALBUTEROL SULFATE 3 ML: .5; 3 SOLUTION RESPIRATORY (INHALATION) at 19:05

## 2020-09-12 RX ADMIN — POTASSIUM CHLORIDE 40 MEQ: 1500 TABLET, EXTENDED RELEASE ORAL at 17:29

## 2020-09-12 RX ADMIN — IPRATROPIUM BROMIDE AND ALBUTEROL SULFATE 3 ML: .5; 3 SOLUTION RESPIRATORY (INHALATION) at 15:55

## 2020-09-12 RX ADMIN — PREDNISONE 60 MG: 20 TABLET ORAL at 15:55

## 2020-09-12 RX ADMIN — ALBUTEROL SULFATE 5 MG: 2.5 SOLUTION RESPIRATORY (INHALATION) at 17:30

## 2020-09-12 NOTE — ED TRIAGE NOTES
Pt presents via EMS from home with shortness of breath \"since she left AMA from inpatient a few days ago\" Per EMS report patient states she has taken 5-10 of her home breathing treatments without relief.

## 2020-09-12 NOTE — ED PROVIDER NOTES
EMERGENCY DEPARTMENT HISTORY AND PHYSICAL EXAM    Date: 9/12/2020  Patient Name: Brittney El    History of Presenting Illness     Chief Complaint   Patient presents with    Shortness of Breath         History Provided By: Patient    Chief Complaint: Shortness of breath, wheezing  Duration: 2 days  Timing: Worsening  Location: Chest  Quality: Wheezing  Severity: Severe  Modifying Factors: Not improved with nebulizer treatments at home  Associated Symptoms: none       Additional History (Context): Brittney El is a 63 y.o. female with a history of COPD who presents today for history as listed above. Patient reports she was recently admitted to the hospital on September 7 and signed herself out AMA a couple days ago because they would not give her a neb treatment when she asked for 1. Patient reports she thinks she has been treated for pneumonia. Patient reports she went home and did her own nebulizer treatments with relief. Patient states nebulizer treatments today did not relieve her symptoms. Denies any fevers or chills. Reports she was tested for COVID and thinks it was negative. Patient denies any chest pain, abdominal pain, nausea, vomiting, chills, body aches, loss of sense of taste or smell. PCP: Alberto Lind NP    Current Facility-Administered Medications   Medication Dose Route Frequency Provider Last Rate Last Dose    potassium chloride (K-DUR, KLOR-CON) SR tablet 40 mEq  40 mEq Oral BID Angie Avendano PA   40 mEq at 09/12/20 1721     Current Outpatient Medications   Medication Sig Dispense Refill    albuterol (PROVENTIL HFA, VENTOLIN HFA, PROAIR HFA) 90 mcg/actuation inhaler Take 2 Puffs by inhalation every four (4) hours as needed for Wheezing or Shortness of Breath for up to 5 days.  1 Inhaler 0    predniSONE (STERAPRED DS) 10 mg dose pack 6 day dose pack per package instructions 1 Package 0    citalopram (CeleXA) 20 mg tablet 1 tablet      gemfibroziL (LOPID) 600 mg tablet 1 tablet      hydroCHLOROthiazide (HYDRODIURIL) 25 mg tablet 1 tablet in the morning      hydrOXYzine HCL (ATARAX) 50 mg tablet 1 tablet as needed      isosorbide mononitrate ER (IMDUR) 30 mg tablet Take 30 mg by mouth daily.  lisinopriL (PRINIVIL, ZESTRIL) 20 mg tablet 1 tablet      sucralfate (CARAFATE) 100 mg/mL suspension Take 1,000 mg by mouth.  QUEtiapine (SEROquel) 50 mg tablet TAKE 1 TABLET BY MOUTH AT BEDTIME FOR MOOD      albuterol-ipratropium (DUO-NEB) 2.5 mg-0.5 mg/3 ml nebu 3 mL by Nebulization route every four (4) hours as needed for Wheezing. 30 Nebule 0    doxycycline (VIBRA-TABS) 100 mg tablet Take 1 Tab by mouth two (2) times a day for 7 days. 14 Tab 0    albuterol sulfate 90 mcg/actuation aebs Take 2 Puffs by inhalation every four to six (4-6) hours as needed for Wheezing. 1 Inhaler 0    furosemide (Lasix) 20 mg tablet Take 1 Tab by mouth daily. 5 Tab 0    cloNIDine HCL (CATAPRES) 0.2 mg tablet Take 1 Tab by mouth two (2) times a day. 30 Tab 1    amLODIPine (NORVASC) 10 mg tablet Take 1 Tab by mouth daily. 15 Tab 0    ALPRAZolam (Xanax) 0.5 mg tablet Take 1 Tab by mouth every eight (8) hours as needed for Anxiety. Max Daily Amount: 1.5 mg. 20 Tab 0    aspirin delayed-release 81 mg tablet Take 1 Tab by mouth daily. 30 Tab 0    famotidine (PEPCID) 20 mg tablet Take 1 Tab by mouth daily. 30 Tab 1    OXYGEN-AIR DELIVERY SYSTEMS 3 L by IntraNASal route as needed for Other (sob).  docusate sodium (COLACE) 100 mg capsule Take 1 Cap by mouth two (2) times a day. 120 Cap 0    fluticasone furoate-vilanterol (BREO ELLIPTA) 200-25 mcg/dose inhaler Take 1 Puff by inhalation daily.  arformoterol (BROVANA) 15 mcg/2 mL nebu neb solution 2 mL by Nebulization route two (2) times a day. 60 Vial 0    budesonide (PULMICORT) 0.5 mg/2 mL nbsp 2 mL by Nebulization route two (2) times a day. 60 Each 0    tiotropium (SPIRIVA) 18 mcg inhalation capsule Take 1 Cap by inhalation daily.  27 Cap 0    atorvastatin (LIPITOR) 20 mg tablet Take 1 Tab by mouth nightly. 30 Tab 0    metoprolol tartrate (LOPRESSOR) 25 mg tablet Take 0.5 Tabs by mouth every twelve (12) hours. 30 Tab 0    roflumilast (DALIRESP) 500 mcg tab tablet Take 1 Tab by mouth daily. 30 Tab 0    naloxone (NARCAN) 4 mg/actuation nasal spray Use 1 spray intranasally, then discard. Repeat with new spray every 2 min as needed for opioid overdose symptoms, alternating nostrils. 1 Each 0    montelukast (SINGULAIR) 10 mg tablet Take 1 Tab by mouth nightly.  30 Tab 1       Past History     Past Medical History:  Past Medical History:   Diagnosis Date    CHF [facility]congestive heart failure) ([facility])     Chronic respiratory failure with hypoxia ([address] 9/7/2020    CKD (chronic kidney disease) stage 3, GFR 30-59 ml/min ([address] 10/31/2019    Cocaine abuse ([address] 11/26/2017    COPD (chronic obstructive pulmonary disease) with chronic bronchitis ([address] 12/19/2017    Dependence on supplemental oxygen     Depression 3/22/2020    Drug-seeking behavior 11/19/2016    Endocrine disease     thyroid issues    Essential hypertension 3/10/2017    Fe deficiency anemia 10/27/2012    Fibromyalgia 12/16/2016    Gastroesophageal reflux disease without esophagitis 10/10/2016    Gastrointestinal disorder     \"blockage in my stomach\"    Hypercholesterolemia     Hypertension     Hypertensive heart failure ([address] 12/19/2017    Morbid obesity due to excess calories ([address] 3/10/2017    NSTEMI (non-ST elevated myocardial infarction) ([address] 3/22/2020    On home O2 12/3/2015    Overview:  2L at home per pt for approx 8 years    Polysubstance abuse ([address] 9/15/2018    Respiratory failure ([address] 1/11/2017    S/P hernia repair 10/31/2019    Sleep apnea 12/19/2017    T wave inversion in EKG 3/9/2019    Tobacco use 1/78/1824    Uncomplicated severe persistent asthma 12/13/2016    Vocal cord disease 12/7/2016       Past Surgical History:  Past Surgical History:   Procedure Laterality Date    HX GI      Exploratory laparotomy with lysis of adhesions and primary repair of incarcerated umbilical hernia       Family History:  No family history on file. Social History:  Social History     Tobacco Use    Smoking status: Current Every Day Smoker     Packs/day: 0.25    Smokeless tobacco: Current User   Substance Use Topics    Alcohol use: No     Comment: socially    Drug use: Not Currently     Types: Heroin       Allergies:  No Known Allergies      Review of Systems   Review of Systems   Constitutional: Negative for chills and fever. HENT: Negative for congestion, rhinorrhea and sore throat. Respiratory: Positive for shortness of breath and wheezing. Negative for cough. Cardiovascular: Negative for chest pain. Gastrointestinal: Negative for abdominal pain, blood in stool, constipation, diarrhea, nausea and vomiting. Genitourinary: Negative for dysuria, frequency and hematuria. Musculoskeletal: Negative for back pain and myalgias. Skin: Negative for rash and wound. Neurological: Negative for dizziness and headaches. All other systems reviewed and are negative. All Other Systems Negative  Physical Exam     Vitals:    09/12/20 1656 09/12/20 1710   BP:  (!) 199/72   Pulse: 70 71   Resp: 15 19   Temp:  98.5 °F (36.9 °C)   SpO2: 100% 99%     Physical Exam  Vitals signs and nursing note reviewed. Constitutional:       General: She is not in acute distress. Appearance: She is well-developed. She is not diaphoretic. HENT:      Head: Normocephalic and atraumatic. Eyes:      Conjunctiva/sclera: Conjunctivae normal.   Neck:      Musculoskeletal: Normal range of motion and neck supple. Cardiovascular:      Rate and Rhythm: Normal rate and regular rhythm. Heart sounds: Normal heart sounds. Pulmonary:      Effort: Tachypnea, accessory muscle usage and respiratory distress present. Breath sounds: Decreased breath sounds and wheezing present.    Chest: Chest wall: No tenderness. Abdominal:      General: Bowel sounds are normal. There is no distension. Palpations: Abdomen is soft. Tenderness: There is no abdominal tenderness. There is no guarding or rebound. Musculoskeletal:         General: No deformity. Skin:     General: Skin is warm and dry. Neurological:      Mental Status: She is alert and oriented to person, place, and time. Deep Tendon Reflexes: Reflexes are normal and symmetric. Diagnostic Study Results     Labs -     Recent Results (from the past 12 hour(s))   METABOLIC PANEL, BASIC    Collection Time: 09/12/20  3:56 PM   Result Value Ref Range    Sodium 145 136 - 145 mmol/L    Potassium 3.4 (L) 3.5 - 5.5 mmol/L    Chloride 110 100 - 111 mmol/L    CO2 31 21 - 32 mmol/L    Anion gap 4 3.0 - 18 mmol/L    Glucose 118 (H) 74 - 99 mg/dL    BUN 23 (H) 7.0 - 18 MG/DL    Creatinine 0.99 0.6 - 1.3 MG/DL    BUN/Creatinine ratio 23 (H) 12 - 20      GFR est AA >60 >60 ml/min/1.73m2    GFR est non-AA 58 (L) >60 ml/min/1.73m2    Calcium 8.0 (L) 8.5 - 10.1 MG/DL   CBC WITH AUTOMATED DIFF    Collection Time: 09/12/20  3:56 PM   Result Value Ref Range    WBC 10.5 4.6 - 13.2 K/uL    RBC 4.56 4.20 - 5.30 M/uL    HGB 13.4 12.0 - 16.0 g/dL    HCT 40.3 35.0 - 45.0 %    MCV 88.4 74.0 - 97.0 FL    MCH 29.4 24.0 - 34.0 PG    MCHC 33.3 31.0 - 37.0 g/dL    RDW 13.2 11.6 - 14.5 %    PLATELET 440 969 - 682 K/uL    MPV 10.0 9.2 - 11.8 FL    NEUTROPHILS 54 40 - 73 %    LYMPHOCYTES 38 21 - 52 %    MONOCYTES 6 3 - 10 %    EOSINOPHILS 2 0 - 5 %    BASOPHILS 0 0 - 2 %    ABS. NEUTROPHILS 5.7 1.8 - 8.0 K/UL    ABS. LYMPHOCYTES 3.9 (H) 0.9 - 3.6 K/UL    ABS. MONOCYTES 0.7 0.05 - 1.2 K/UL    ABS. EOSINOPHILS 0.2 0.0 - 0.4 K/UL    ABS.  BASOPHILS 0.0 0.0 - 0.1 K/UL    DF AUTOMATED     TROPONIN I    Collection Time: 09/12/20  3:56 PM   Result Value Ref Range    Troponin-I, QT <0.02 0.0 - 0.045 NG/ML   MAGNESIUM    Collection Time: 09/12/20  3:56 PM   Result Value Ref Range    Magnesium 2.3 1.6 - 2.6 mg/dL   EKG, 12 LEAD, INITIAL    Collection Time: 09/12/20  4:01 PM   Result Value Ref Range    Ventricular Rate 69 BPM    Atrial Rate 69 BPM    P-R Interval 118 ms    QRS Duration 98 ms    Q-T Interval 358 ms    QTC Calculation (Bezet) 383 ms    Calculated P Axis 74 degrees    Calculated R Axis 60 degrees    Calculated T Axis 49 degrees    Diagnosis       Normal sinus rhythm  Minimal voltage criteria for LVH, may be normal variant  Anterior infarct , age undetermined  Abnormal ECG  When compared with ECG of 08-SEP-2020 13:26,  Nonspecific T wave abnormality, improved in Lateral leads  QT has shortened         Radiologic Studies -   XR CHEST PORT    (Results Pending)     CT Results  (Last 48 hours)    None        CXR Results  (Last 48 hours)    None            Medical Decision Making   I am the first provider for this patient. I reviewed the vital signs, available nursing notes, past medical history, past surgical history, family history and social history. Vital Signs-Reviewed the patient's vital signs. Records Reviewed: Nursing Notes and Old Medical Records     Procedures: None   Procedures    Provider Notes (Medical Decision Making):     Differential: Pneumonia, CHF exacerbation, asthma exacerbation, ACS, arrhythmia, COVID, viral illness      Plan: We will order cardiac work-up, DuoNeb, mag and  steroids. 5:24 PM  Patient is very agitated, ripped all of her cardiac monitor leads off and refuses to put them back on. Patient also refuses to have a blood pressure taken. Patient reports initial DuoNeb helped somewhat and would like another. Will order dose of potassium for potassium of 3.4.    6:01 PM : Pt care transferred to [facility] NP ,ED provider. History of patient complaint(s), available diagnostic reports and current treatment plan has been discussed thoroughly. Bedside rounding on patient occured : no .   Intended disposition of patient : Home Pending diagnostics reports and/or labs (please list): Chest xray         MED RECONCILIATION:  Current Facility-Administered Medications   Medication Dose Route Frequency    potassium chloride (K-DUR, KLOR-CON) SR tablet 40 mEq  40 mEq Oral BID     Current Outpatient Medications   Medication Sig    albuterol (PROVENTIL HFA, VENTOLIN HFA, PROAIR HFA) 90 mcg/actuation inhaler Take 2 Puffs by inhalation every four (4) hours as needed for Wheezing or Shortness of Breath for up to 5 days.  predniSONE (STERAPRED DS) 10 mg dose pack 6 day dose pack per package instructions    citalopram (CeleXA) 20 mg tablet 1 tablet    gemfibroziL (LOPID) 600 mg tablet 1 tablet    hydroCHLOROthiazide (HYDRODIURIL) 25 mg tablet 1 tablet in the morning    hydrOXYzine HCL (ATARAX) 50 mg tablet 1 tablet as needed    isosorbide mononitrate ER (IMDUR) 30 mg tablet Take 30 mg by mouth daily.  lisinopriL (PRINIVIL, ZESTRIL) 20 mg tablet 1 tablet    sucralfate (CARAFATE) 100 mg/mL suspension Take 1,000 mg by mouth.  QUEtiapine (SEROquel) 50 mg tablet TAKE 1 TABLET BY MOUTH AT BEDTIME FOR MOOD    albuterol-ipratropium (DUO-NEB) 2.5 mg-0.5 mg/3 ml nebu 3 mL by Nebulization route every four (4) hours as needed for Wheezing.  doxycycline (VIBRA-TABS) 100 mg tablet Take 1 Tab by mouth two (2) times a day for 7 days.  albuterol sulfate 90 mcg/actuation aebs Take 2 Puffs by inhalation every four to six (4-6) hours as needed for Wheezing.  furosemide (Lasix) 20 mg tablet Take 1 Tab by mouth daily.  cloNIDine HCL (CATAPRES) 0.2 mg tablet Take 1 Tab by mouth two (2) times a day.  amLODIPine (NORVASC) 10 mg tablet Take 1 Tab by mouth daily.  ALPRAZolam (Xanax) 0.5 mg tablet Take 1 Tab by mouth every eight (8) hours as needed for Anxiety. Max Daily Amount: 1.5 mg.    aspirin delayed-release 81 mg tablet Take 1 Tab by mouth daily.  famotidine (PEPCID) 20 mg tablet Take 1 Tab by mouth daily.     OXYGEN-AIR DELIVERY SYSTEMS 3 L by IntraNASal route as needed for Other (sob).  docusate sodium (COLACE) 100 mg capsule Take 1 Cap by mouth two (2) times a day.  fluticasone furoate-vilanterol (BREO ELLIPTA) 200-25 mcg/dose inhaler Take 1 Puff by inhalation daily.  arformoterol (BROVANA) 15 mcg/2 mL nebu neb solution 2 mL by Nebulization route two (2) times a day.  budesonide (PULMICORT) 0.5 mg/2 mL nbsp 2 mL by Nebulization route two (2) times a day.  tiotropium (SPIRIVA) 18 mcg inhalation capsule Take 1 Cap by inhalation daily.  atorvastatin (LIPITOR) 20 mg tablet Take 1 Tab by mouth nightly.  metoprolol tartrate (LOPRESSOR) 25 mg tablet Take 0.5 Tabs by mouth every twelve (12) hours.  roflumilast (DALIRESP) 500 mcg tab tablet Take 1 Tab by mouth daily.  naloxone (NARCAN) 4 mg/actuation nasal spray Use 1 spray intranasally, then discard. Repeat with new spray every 2 min as needed for opioid overdose symptoms, alternating nostrils.  montelukast (SINGULAIR) 10 mg tablet Take 1 Tab by mouth nightly. Disposition:   home  T    Diagnosis     Clinical Impression:   1. Acute exacerbation of chronic obstructive pulmonary disease (COPD) ([address]    2. Hypokalemia          \"Please note that this dictation was completed with [employer], the computer voice recognition software. Quite often unanticipated grammatical, syntax, homophones, and other interpretive errors are inadvertently transcribed by the computer software. Please disregard these errors. Please excuse any errors that have escaped final proofreading. \"        Progress note    Assumed care of patient at 1800 from [facility]. States patient needs a reevaluation for possible discharge and evaluation of her chest x-ray. Into evaluate patient states she feels little bit better wheezing is decreased patient admits to smoking also has a history of smoking crack cocaine and marijuana. Chest x-ray does not show any acute findings no infiltrates noted.     I will order 1 more dose of albuterol nebulizer and discharge patient to home. Patient is improved. Patient is encouraged not to smoke cigarettes crack cocaine or marijuana as it makes her asthma worse. No other acute illnesses suspected patient discharged home in no distress. 7:39 PM  Bailee Traceyrobertmichael Hampton's  results have been reviewed with her. She has been counseled regarding her diagnosis, treatment, and plan. She verbally conveys understanding and agreement of the signs, symptoms, diagnosis, treatment and prognosis and additionally agrees to follow up as discussed. She also agrees with the care-plan and conveys that all of her questions have been answered. I have also provided discharge instructions for her that include: educational information regarding their diagnosis and treatment, and list of reasons why they would want to return to the ED prior to their follow-up appointment, should her condition change.         Cristal Fatima ENP-C, FNP-C

## 2020-09-12 NOTE — ED NOTES
Assumed care of pt   Introduced self to pt  Updated white board and explained usage  Pt alert and oriented x 4   C/o SOB  Pt on monitor monitor   Patient NSR   - jugular ditenion  Pt lung sounds wheezy   Pt c/o dyspnea on exertion   Pt denied chest pain   Pt denied cough   SpO2 98 on room air  Pt was washing clothes in sink when I came in room   Updated about plan of care   Sitting in bed with call bell in reach   Will continue to monitor and assess

## 2020-09-13 ENCOUNTER — HOSPITAL ENCOUNTER (OUTPATIENT)
Age: 55
Setting detail: OBSERVATION
Discharge: LEFT BEFORE DISCHARGE | End: 2020-09-14
Attending: EMERGENCY MEDICINE | Admitting: HOSPITALIST
Payer: MEDICAID

## 2020-09-13 ENCOUNTER — APPOINTMENT (OUTPATIENT)
Dept: GENERAL RADIOLOGY | Age: 55
End: 2020-09-13
Attending: EMERGENCY MEDICINE
Payer: MEDICAID

## 2020-09-13 DIAGNOSIS — J44.1 ACUTE EXACERBATION OF CHRONIC OBSTRUCTIVE PULMONARY DISEASE (COPD) (HCC): Primary | ICD-10-CM

## 2020-09-13 DIAGNOSIS — R06.03 ACUTE RESPIRATORY DISTRESS: ICD-10-CM

## 2020-09-13 LAB
ANION GAP SERPL CALC-SCNC: 2 MMOL/L (ref 3–18)
ATRIAL RATE: 69 BPM
BASOPHILS # BLD: 0 K/UL (ref 0–0.1)
BASOPHILS NFR BLD: 0 % (ref 0–2)
BNP SERPL-MCNC: 594 PG/ML (ref 0–900)
BUN SERPL-MCNC: 22 MG/DL (ref 7–18)
BUN/CREAT SERPL: 13 (ref 12–20)
CALCIUM SERPL-MCNC: 8.6 MG/DL (ref 8.5–10.1)
CALCULATED P AXIS, ECG09: 74 DEGREES
CALCULATED R AXIS, ECG10: 60 DEGREES
CALCULATED T AXIS, ECG11: 49 DEGREES
CHLORIDE SERPL-SCNC: 108 MMOL/L (ref 100–111)
CK MB CFR SERPL CALC: 2 % (ref 0–4)
CK MB SERPL-MCNC: 2 NG/ML (ref 5–25)
CK SERPL-CCNC: 101 U/L (ref 26–192)
CO2 SERPL-SCNC: 31 MMOL/L (ref 21–32)
CREAT SERPL-MCNC: 1.71 MG/DL (ref 0.6–1.3)
DIAGNOSIS, 93000: NORMAL
DIFFERENTIAL METHOD BLD: ABNORMAL
EOSINOPHIL # BLD: 0.1 K/UL (ref 0–0.4)
EOSINOPHIL NFR BLD: 1 % (ref 0–5)
ERYTHROCYTE [DISTWIDTH] IN BLOOD BY AUTOMATED COUNT: 13.3 % (ref 11.6–14.5)
GLUCOSE SERPL-MCNC: 92 MG/DL (ref 74–99)
HCT VFR BLD AUTO: 40.7 % (ref 35–45)
HGB BLD-MCNC: 13 G/DL (ref 12–16)
LYMPHOCYTES # BLD: 3.9 K/UL (ref 0.9–3.6)
LYMPHOCYTES NFR BLD: 35 % (ref 21–52)
MCH RBC QN AUTO: 29 PG (ref 24–34)
MCHC RBC AUTO-ENTMCNC: 31.9 G/DL (ref 31–37)
MCV RBC AUTO: 90.8 FL (ref 74–97)
MONOCYTES # BLD: 0.8 K/UL (ref 0.05–1.2)
MONOCYTES NFR BLD: 7 % (ref 3–10)
NEUTS SEG # BLD: 6.3 K/UL (ref 1.8–8)
NEUTS SEG NFR BLD: 57 % (ref 40–73)
P-R INTERVAL, ECG05: 118 MS
PLATELET # BLD AUTO: 232 K/UL (ref 135–420)
PMV BLD AUTO: 10.3 FL (ref 9.2–11.8)
POTASSIUM SERPL-SCNC: 4.6 MMOL/L (ref 3.5–5.5)
Q-T INTERVAL, ECG07: 358 MS
QRS DURATION, ECG06: 98 MS
QTC CALCULATION (BEZET), ECG08: 383 MS
RBC # BLD AUTO: 4.48 M/UL (ref 4.2–5.3)
SODIUM SERPL-SCNC: 141 MMOL/L (ref 136–145)
TROPONIN I SERPL-MCNC: <0.02 NG/ML (ref 0–0.04)
VENTRICULAR RATE, ECG03: 69 BPM
WBC # BLD AUTO: 11.1 K/UL (ref 4.6–13.2)

## 2020-09-13 PROCEDURE — 85025 COMPLETE CBC W/AUTO DIFF WBC: CPT

## 2020-09-13 PROCEDURE — 74011000250 HC RX REV CODE- 250: Performed by: EMERGENCY MEDICINE

## 2020-09-13 PROCEDURE — 99284 EMERGENCY DEPT VISIT MOD MDM: CPT

## 2020-09-13 PROCEDURE — 80048 BASIC METABOLIC PNL TOTAL CA: CPT

## 2020-09-13 PROCEDURE — 96374 THER/PROPH/DIAG INJ IV PUSH: CPT

## 2020-09-13 PROCEDURE — 82550 ASSAY OF CK (CPK): CPT

## 2020-09-13 PROCEDURE — 83880 ASSAY OF NATRIURETIC PEPTIDE: CPT

## 2020-09-13 PROCEDURE — 74011250636 HC RX REV CODE- 250/636: Performed by: EMERGENCY MEDICINE

## 2020-09-13 PROCEDURE — 93005 ELECTROCARDIOGRAM TRACING: CPT

## 2020-09-13 PROCEDURE — 94640 AIRWAY INHALATION TREATMENT: CPT

## 2020-09-13 PROCEDURE — 71045 X-RAY EXAM CHEST 1 VIEW: CPT

## 2020-09-13 PROCEDURE — 96375 TX/PRO/DX INJ NEW DRUG ADDON: CPT

## 2020-09-13 RX ORDER — IPRATROPIUM BROMIDE AND ALBUTEROL SULFATE 2.5; .5 MG/3ML; MG/3ML
3 SOLUTION RESPIRATORY (INHALATION)
Status: COMPLETED | OUTPATIENT
Start: 2020-09-13 | End: 2020-09-13

## 2020-09-13 RX ORDER — FUROSEMIDE 10 MG/ML
40 INJECTION INTRAMUSCULAR; INTRAVENOUS
Status: COMPLETED | OUTPATIENT
Start: 2020-09-13 | End: 2020-09-13

## 2020-09-13 RX ORDER — MAGNESIUM SULFATE HEPTAHYDRATE 40 MG/ML
2 INJECTION, SOLUTION INTRAVENOUS ONCE
Status: COMPLETED | OUTPATIENT
Start: 2020-09-13 | End: 2020-09-13

## 2020-09-13 RX ADMIN — IPRATROPIUM BROMIDE AND ALBUTEROL SULFATE 3 ML: .5; 3 SOLUTION RESPIRATORY (INHALATION) at 22:08

## 2020-09-13 RX ADMIN — IPRATROPIUM BROMIDE AND ALBUTEROL SULFATE 3 ML: .5; 3 SOLUTION RESPIRATORY (INHALATION) at 21:10

## 2020-09-13 RX ADMIN — METHYLPREDNISOLONE SODIUM SUCCINATE 125 MG: 125 INJECTION, POWDER, FOR SOLUTION INTRAMUSCULAR; INTRAVENOUS at 21:12

## 2020-09-13 RX ADMIN — IPRATROPIUM BROMIDE AND ALBUTEROL SULFATE 3 ML: .5; 3 SOLUTION RESPIRATORY (INHALATION) at 22:44

## 2020-09-13 RX ADMIN — MAGNESIUM SULFATE HEPTAHYDRATE 2 G: 40 INJECTION, SOLUTION INTRAVENOUS at 22:44

## 2020-09-13 RX ADMIN — FUROSEMIDE 40 MG: 10 INJECTION, SOLUTION INTRAMUSCULAR; INTRAVENOUS at 21:14

## 2020-09-13 NOTE — ED TRIAGE NOTES
Pt BIB EMS from home for chest pain x 1 hour. Took nitro, given 324 mg aspirin. Chronically SOB. Bilateral wheezing, took breathing tx at home. Well known to dept.     Past Medical History:   Diagnosis Date    [facility]     Chronic respiratory failure with hypoxia ([address] 9/7/2020    CKD (chronic kidney disease) stage 3, GFR 30-59 ml/min ([address] 10/31/2019    Cocaine abuse ([address] 11/26/2017    COPD (chronic obstructive pulmonary disease) with chronic bronchitis ([address] 12/19/2017    Dependence on supplemental oxygen     Depression 3/22/2020    Drug-seeking behavior 11/19/2016    Endocrine disease     thyroid issues    Essential hypertension 3/10/2017    Fe deficiency anemia 10/27/2012    Fibromyalgia 12/16/2016    Gastroesophageal reflux disease without esophagitis 10/10/2016    Gastrointestinal disorder     \"blockage in my stomach\"    Hypercholesterolemia     Hypertension     Hypertensive heart failure ([address] 12/19/2017    Morbid obesity due to excess calories ([address] 3/10/2017    NSTEMI (non-ST elevated myocardial infarction) ([address] 3/22/2020    On home O2 12/3/2015    Overview:  2L at home per pt for approx 8 years    Polysubstance abuse ([address] 9/15/2018    Respiratory failure ([address] 1/11/2017    S/P hernia repair 10/31/2019    Sleep apnea 12/19/2017    T wave inversion in EKG 3/9/2019    Tobacco use 9/12/7115    Uncomplicated severe persistent asthma 12/13/2016    Vocal cord disease 12/7/2016

## 2020-09-14 ENCOUNTER — HOSPITAL ENCOUNTER (INPATIENT)
Dept: GENERAL RADIOLOGY | Age: 55
Discharge: HOME | End: 2020-09-14
Attending: EMERGENCY MEDICINE
Payer: MEDICAID

## 2020-09-14 ENCOUNTER — HOSPITAL ENCOUNTER (EMERGENCY)
Age: 55
Discharge: HOME | End: 2020-09-14
Attending: EMERGENCY MEDICINE
Payer: MEDICAID

## 2020-09-14 VITALS
DIASTOLIC BLOOD PRESSURE: 75 MMHG | OXYGEN SATURATION: 97 % | HEART RATE: 65 BPM | TEMPERATURE: 98.1 F | SYSTOLIC BLOOD PRESSURE: 158 MMHG | RESPIRATION RATE: 20 BRPM

## 2020-09-14 VITALS
BODY MASS INDEX: 34.5 KG/M2 | SYSTOLIC BLOOD PRESSURE: 132 MMHG | WEIGHT: 170.9 LBS | DIASTOLIC BLOOD PRESSURE: 74 MMHG | HEART RATE: 77 BPM | RESPIRATION RATE: 18 BRPM | TEMPERATURE: 98.6 F | OXYGEN SATURATION: 97 %

## 2020-09-14 DIAGNOSIS — J44.1 ACUTE EXACERBATION OF CHRONIC OBSTRUCTIVE PULMONARY DISEASE (COPD) (HCC): ICD-10-CM

## 2020-09-14 DIAGNOSIS — R06.02 SHORTNESS OF BREATH: ICD-10-CM

## 2020-09-14 DIAGNOSIS — R07.9 ACUTE CHEST PAIN: Primary | ICD-10-CM

## 2020-09-14 LAB
ALBUMIN SERPL-MCNC: 3.6 G/DL (ref 3.4–5)
ALBUMIN/GLOB SERPL: 1 {RATIO} (ref 0.8–1.7)
ALP SERPL-CCNC: 75 U/L (ref 45–117)
ALT SERPL-CCNC: 37 U/L (ref 13–56)
AMPHET UR QL SCN: NEGATIVE
ANION GAP SERPL CALC-SCNC: 4 MMOL/L (ref 3–18)
APPEARANCE UR: CLEAR
AST SERPL-CCNC: 14 U/L (ref 10–38)
ATRIAL RATE: 60 BPM
BARBITURATES UR QL SCN: NEGATIVE
BASOPHILS # BLD: 0 K/UL (ref 0–0.1)
BASOPHILS NFR BLD: 0 % (ref 0–2)
BENZODIAZ UR QL: NEGATIVE
BILIRUB SERPL-MCNC: 1 MG/DL (ref 0.2–1)
BILIRUB UR QL: NEGATIVE
BNP SERPL-MCNC: 240 PG/ML (ref 0–900)
BUN SERPL-MCNC: 30 MG/DL (ref 7–18)
BUN/CREAT SERPL: 21 (ref 12–20)
CALCIUM SERPL-MCNC: 9.1 MG/DL (ref 8.5–10.1)
CALCULATED P AXIS, ECG09: 79 DEGREES
CALCULATED R AXIS, ECG10: 60 DEGREES
CALCULATED T AXIS, ECG11: 52 DEGREES
CANNABINOIDS UR QL SCN: NEGATIVE
CHLORIDE SERPL-SCNC: 101 MMOL/L (ref 100–111)
CO2 SERPL-SCNC: 29 MMOL/L (ref 21–32)
COCAINE UR QL SCN: NEGATIVE
COLOR UR: YELLOW
CREAT SERPL-MCNC: 1.43 MG/DL (ref 0.6–1.3)
DIAGNOSIS, 93000: NORMAL
DIFFERENTIAL METHOD BLD: ABNORMAL
EOSINOPHIL # BLD: 0 K/UL (ref 0–0.4)
EOSINOPHIL NFR BLD: 0 % (ref 0–5)
ERYTHROCYTE [DISTWIDTH] IN BLOOD BY AUTOMATED COUNT: 12.6 % (ref 11.6–14.5)
GLOBULIN SER CALC-MCNC: 3.5 G/DL (ref 2–4)
GLUCOSE SERPL-MCNC: 112 MG/DL (ref 74–99)
GLUCOSE UR STRIP.AUTO-MCNC: NEGATIVE MG/DL
HCT VFR BLD AUTO: 41.5 % (ref 35–45)
HDSCOM,HDSCOM: ABNORMAL
HGB BLD-MCNC: 13.6 G/DL (ref 12–16)
HGB UR QL STRIP: NEGATIVE
KETONES UR QL STRIP.AUTO: NEGATIVE MG/DL
LEUKOCYTE ESTERASE UR QL STRIP.AUTO: NEGATIVE
LIPASE SERPL-CCNC: 82 U/L (ref 73–393)
LYMPHOCYTES # BLD: 1.9 K/UL (ref 0.9–3.6)
LYMPHOCYTES NFR BLD: 11 % (ref 21–52)
MCH RBC QN AUTO: 28.9 PG (ref 24–34)
MCHC RBC AUTO-ENTMCNC: 32.8 G/DL (ref 31–37)
MCV RBC AUTO: 88.3 FL (ref 74–97)
METHADONE UR QL: POSITIVE
MONOCYTES # BLD: 0.8 K/UL (ref 0.05–1.2)
MONOCYTES NFR BLD: 4 % (ref 3–10)
NEUTS SEG # BLD: 14.9 K/UL (ref 1.8–8)
NEUTS SEG NFR BLD: 85 % (ref 40–73)
NITRITE UR QL STRIP.AUTO: NEGATIVE
OPIATES UR QL: POSITIVE
P-R INTERVAL, ECG05: 122 MS
PCP UR QL: NEGATIVE
PH UR STRIP: 5 [PH] (ref 5–8)
PLATELET # BLD AUTO: 281 K/UL (ref 135–420)
PMV BLD AUTO: 10.3 FL (ref 9.2–11.8)
POTASSIUM SERPL-SCNC: 4.4 MMOL/L (ref 3.5–5.5)
PROT SERPL-MCNC: 7.1 G/DL (ref 6.4–8.2)
PROT UR STRIP-MCNC: NEGATIVE MG/DL
Q-T INTERVAL, ECG07: 426 MS
QRS DURATION, ECG06: 94 MS
QTC CALCULATION (BEZET), ECG08: 426 MS
RBC # BLD AUTO: 4.7 M/UL (ref 4.2–5.3)
SODIUM SERPL-SCNC: 134 MMOL/L (ref 136–145)
SP GR UR REFRACTOMETRY: 1.02 (ref 1–1.03)
TROPONIN I SERPL-MCNC: <0.02 NG/ML (ref 0–0.04)
UROBILINOGEN UR QL STRIP.AUTO: 0.2 EU/DL (ref 0.2–1)
VENTRICULAR RATE, ECG03: 60 BPM
WBC # BLD AUTO: 17.6 K/UL (ref 4.6–13.2)

## 2020-09-14 PROCEDURE — 94640 AIRWAY INHALATION TREATMENT: CPT

## 2020-09-14 PROCEDURE — 71046 X-RAY EXAM CHEST 2 VIEWS: CPT

## 2020-09-14 PROCEDURE — 74011250637 HC RX REV CODE- 250/637: Performed by: EMERGENCY MEDICINE

## 2020-09-14 PROCEDURE — 77010033678 HC OXYGEN DAILY

## 2020-09-14 PROCEDURE — 99218 HC RM OBSERVATION: CPT

## 2020-09-14 PROCEDURE — 83880 ASSAY OF NATRIURETIC PEPTIDE: CPT

## 2020-09-14 PROCEDURE — 65270000029 HC RM PRIVATE

## 2020-09-14 PROCEDURE — 74011250636 HC RX REV CODE- 250/636: Performed by: EMERGENCY MEDICINE

## 2020-09-14 PROCEDURE — 99284 EMERGENCY DEPT VISIT MOD MDM: CPT

## 2020-09-14 PROCEDURE — 74011250636 HC RX REV CODE- 250/636: Performed by: HOSPITALIST

## 2020-09-14 PROCEDURE — 96374 THER/PROPH/DIAG INJ IV PUSH: CPT

## 2020-09-14 PROCEDURE — 80053 COMPREHEN METABOLIC PANEL: CPT

## 2020-09-14 PROCEDURE — 2709999900 HC NON-CHARGEABLE SUPPLY

## 2020-09-14 PROCEDURE — 96375 TX/PRO/DX INJ NEW DRUG ADDON: CPT

## 2020-09-14 PROCEDURE — 77030038269 HC DRN EXT URIN PURWCK BARD -A

## 2020-09-14 PROCEDURE — 74011000258 HC RX REV CODE- 258: Performed by: EMERGENCY MEDICINE

## 2020-09-14 PROCEDURE — 74011000250 HC RX REV CODE- 250: Performed by: EMERGENCY MEDICINE

## 2020-09-14 PROCEDURE — 84484 ASSAY OF TROPONIN QUANT: CPT

## 2020-09-14 PROCEDURE — 81003 URINALYSIS AUTO W/O SCOPE: CPT

## 2020-09-14 PROCEDURE — 74011000258 HC RX REV CODE- 258: Performed by: HOSPITALIST

## 2020-09-14 PROCEDURE — 93005 ELECTROCARDIOGRAM TRACING: CPT

## 2020-09-14 PROCEDURE — 99239 HOSP IP/OBS DSCHRG MGMT >30: CPT | Performed by: HOSPITALIST

## 2020-09-14 PROCEDURE — 85025 COMPLETE CBC W/AUTO DIFF WBC: CPT

## 2020-09-14 PROCEDURE — 74011250637 HC RX REV CODE- 250/637: Performed by: HOSPITALIST

## 2020-09-14 PROCEDURE — 74011000250 HC RX REV CODE- 250: Performed by: HOSPITALIST

## 2020-09-14 PROCEDURE — 80307 DRUG TEST PRSMV CHEM ANLYZR: CPT

## 2020-09-14 PROCEDURE — 83690 ASSAY OF LIPASE: CPT

## 2020-09-14 PROCEDURE — 94761 N-INVAS EAR/PLS OXIMETRY MLT: CPT

## 2020-09-14 RX ORDER — ATORVASTATIN CALCIUM 20 MG/1
20 TABLET, FILM COATED ORAL
Status: DISCONTINUED | OUTPATIENT
Start: 2020-09-14 | End: 2020-09-14 | Stop reason: HOSPADM

## 2020-09-14 RX ORDER — CLONIDINE HYDROCHLORIDE 0.1 MG/1
0.2 TABLET ORAL 2 TIMES DAILY
Status: DISCONTINUED | OUTPATIENT
Start: 2020-09-14 | End: 2020-09-14 | Stop reason: HOSPADM

## 2020-09-14 RX ORDER — METOCLOPRAMIDE HYDROCHLORIDE 5 MG/ML
10 INJECTION INTRAMUSCULAR; INTRAVENOUS ONCE
Status: COMPLETED | OUTPATIENT
Start: 2020-09-14 | End: 2020-09-14

## 2020-09-14 RX ORDER — ONDANSETRON 4 MG/1
4 TABLET, ORALLY DISINTEGRATING ORAL
Status: DISCONTINUED | OUTPATIENT
Start: 2020-09-14 | End: 2020-09-14 | Stop reason: HOSPADM

## 2020-09-14 RX ORDER — METOPROLOL TARTRATE 25 MG/1
12.5 TABLET, FILM COATED ORAL EVERY 12 HOURS
Status: DISCONTINUED | OUTPATIENT
Start: 2020-09-14 | End: 2020-09-14 | Stop reason: HOSPADM

## 2020-09-14 RX ORDER — ISOSORBIDE MONONITRATE 30 MG/1
30 TABLET, EXTENDED RELEASE ORAL DAILY
Status: DISCONTINUED | OUTPATIENT
Start: 2020-09-14 | End: 2020-09-14 | Stop reason: HOSPADM

## 2020-09-14 RX ORDER — IPRATROPIUM BROMIDE 0.5 MG/2.5ML
0.5 SOLUTION RESPIRATORY (INHALATION)
Status: DISCONTINUED | OUTPATIENT
Start: 2020-09-14 | End: 2020-09-14 | Stop reason: SDUPTHER

## 2020-09-14 RX ORDER — LIDOCAINE HYDROCHLORIDE 20 MG/ML
15 SOLUTION OROPHARYNGEAL ONCE
Status: COMPLETED | OUTPATIENT
Start: 2020-09-14 | End: 2020-09-14

## 2020-09-14 RX ORDER — LISINOPRIL 20 MG/1
20 TABLET ORAL DAILY
Status: DISCONTINUED | OUTPATIENT
Start: 2020-09-14 | End: 2020-09-14 | Stop reason: HOSPADM

## 2020-09-14 RX ORDER — GUAIFENESIN 100 MG/5ML
162 LIQUID (ML) ORAL
Status: COMPLETED | OUTPATIENT
Start: 2020-09-14 | End: 2020-09-14

## 2020-09-14 RX ORDER — AMLODIPINE BESYLATE 10 MG/1
10 TABLET ORAL DAILY
Status: DISCONTINUED | OUTPATIENT
Start: 2020-09-14 | End: 2020-09-14 | Stop reason: HOSPADM

## 2020-09-14 RX ORDER — ASPIRIN 81 MG/1
81 TABLET ORAL DAILY
Status: DISCONTINUED | OUTPATIENT
Start: 2020-09-14 | End: 2020-09-14 | Stop reason: HOSPADM

## 2020-09-14 RX ORDER — BUDESONIDE 0.5 MG/2ML
500 INHALANT ORAL
Status: DISCONTINUED | OUTPATIENT
Start: 2020-09-14 | End: 2020-09-14 | Stop reason: HOSPADM

## 2020-09-14 RX ORDER — BUDESONIDE 0.5 MG/2ML
500 INHALANT ORAL 2 TIMES DAILY
Qty: 60 EACH | Refills: 0 | Status: SHIPPED | OUTPATIENT
Start: 2020-09-14 | End: 2020-12-01

## 2020-09-14 RX ORDER — FAMOTIDINE 20 MG/1
20 TABLET, FILM COATED ORAL
Status: COMPLETED | OUTPATIENT
Start: 2020-09-14 | End: 2020-09-14

## 2020-09-14 RX ORDER — IPRATROPIUM BROMIDE AND ALBUTEROL SULFATE 2.5; .5 MG/3ML; MG/3ML
3 SOLUTION RESPIRATORY (INHALATION)
Qty: 30 NEBULE | Refills: 0 | Status: SHIPPED | OUTPATIENT
Start: 2020-09-14 | End: 2020-11-28

## 2020-09-14 RX ORDER — PREDNISONE 20 MG/1
40 TABLET ORAL DAILY
Qty: 8 TAB | Refills: 0 | Status: SHIPPED | OUTPATIENT
Start: 2020-09-14 | End: 2020-09-18

## 2020-09-14 RX ORDER — FLUTICASONE FUROATE AND VILANTEROL TRIFENATATE 200; 25 UG/1; UG/1
1 POWDER RESPIRATORY (INHALATION) DAILY
Qty: 1 INHALER | Refills: 0 | Status: ON HOLD | OUTPATIENT
Start: 2020-09-14 | End: 2020-11-30 | Stop reason: SDUPTHER

## 2020-09-14 RX ORDER — HYDROXYZINE 50 MG/1
50 TABLET, FILM COATED ORAL
Qty: 20 TAB | Refills: 0 | Status: SHIPPED | OUTPATIENT
Start: 2020-09-14 | End: 2020-09-21

## 2020-09-14 RX ORDER — QUETIAPINE FUMARATE 25 MG/1
50 TABLET, FILM COATED ORAL
Status: DISCONTINUED | OUTPATIENT
Start: 2020-09-14 | End: 2020-09-14 | Stop reason: HOSPADM

## 2020-09-14 RX ORDER — SODIUM CHLORIDE 9 MG/ML
75 INJECTION, SOLUTION INTRAVENOUS CONTINUOUS
Status: DISCONTINUED | OUTPATIENT
Start: 2020-09-14 | End: 2020-09-14

## 2020-09-14 RX ORDER — CITALOPRAM 20 MG/1
20 TABLET, FILM COATED ORAL DAILY
Status: DISCONTINUED | OUTPATIENT
Start: 2020-09-14 | End: 2020-09-14 | Stop reason: HOSPADM

## 2020-09-14 RX ORDER — ALBUTEROL SULFATE 90 UG/1
2 AEROSOL, METERED RESPIRATORY (INHALATION)
Qty: 1 INHALER | Refills: 0 | Status: SHIPPED | OUTPATIENT
Start: 2020-09-14 | End: 2020-09-19

## 2020-09-14 RX ORDER — ACETAMINOPHEN 325 MG/1
650 TABLET ORAL
Status: DISCONTINUED | OUTPATIENT
Start: 2020-09-14 | End: 2020-09-14 | Stop reason: HOSPADM

## 2020-09-14 RX ORDER — DOCUSATE SODIUM 100 MG/1
100 CAPSULE, LIQUID FILLED ORAL 2 TIMES DAILY
Status: DISCONTINUED | OUTPATIENT
Start: 2020-09-14 | End: 2020-09-14 | Stop reason: HOSPADM

## 2020-09-14 RX ORDER — ARFORMOTEROL TARTRATE 15 UG/2ML
15 SOLUTION RESPIRATORY (INHALATION)
Status: DISCONTINUED | OUTPATIENT
Start: 2020-09-14 | End: 2020-09-14 | Stop reason: HOSPADM

## 2020-09-14 RX ORDER — HYDROCHLOROTHIAZIDE 25 MG/1
25 TABLET ORAL DAILY
Status: DISCONTINUED | OUTPATIENT
Start: 2020-09-14 | End: 2020-09-14 | Stop reason: HOSPADM

## 2020-09-14 RX ORDER — IPRATROPIUM BROMIDE AND ALBUTEROL SULFATE 2.5; .5 MG/3ML; MG/3ML
3 SOLUTION RESPIRATORY (INHALATION)
Status: DISCONTINUED | OUTPATIENT
Start: 2020-09-14 | End: 2020-09-14 | Stop reason: HOSPADM

## 2020-09-14 RX ORDER — FAMOTIDINE 20 MG/1
20 TABLET, FILM COATED ORAL DAILY
Status: DISCONTINUED | OUTPATIENT
Start: 2020-09-14 | End: 2020-09-14 | Stop reason: HOSPADM

## 2020-09-14 RX ORDER — ONDANSETRON 2 MG/ML
4 INJECTION INTRAMUSCULAR; INTRAVENOUS
Status: DISCONTINUED | OUTPATIENT
Start: 2020-09-14 | End: 2020-09-14 | Stop reason: HOSPADM

## 2020-09-14 RX ORDER — NALOXONE HYDROCHLORIDE 0.4 MG/ML
0.2 INJECTION, SOLUTION INTRAMUSCULAR; INTRAVENOUS; SUBCUTANEOUS
Status: DISCONTINUED | OUTPATIENT
Start: 2020-09-14 | End: 2020-09-14 | Stop reason: HOSPADM

## 2020-09-14 RX ORDER — MONTELUKAST SODIUM 10 MG/1
10 TABLET ORAL
Status: DISCONTINUED | OUTPATIENT
Start: 2020-09-14 | End: 2020-09-14 | Stop reason: HOSPADM

## 2020-09-14 RX ORDER — ARFORMOTEROL TARTRATE 15 UG/2ML
15 SOLUTION RESPIRATORY (INHALATION) 2 TIMES DAILY
Qty: 60 VIAL | Refills: 0 | Status: SHIPPED | OUTPATIENT
Start: 2020-09-14 | End: 2020-12-01

## 2020-09-14 RX ORDER — POLYETHYLENE GLYCOL 3350 17 G/17G
17 POWDER, FOR SOLUTION ORAL DAILY PRN
Status: DISCONTINUED | OUTPATIENT
Start: 2020-09-14 | End: 2020-09-14 | Stop reason: HOSPADM

## 2020-09-14 RX ORDER — HEPARIN SODIUM 5000 [USP'U]/ML
5000 INJECTION, SOLUTION INTRAVENOUS; SUBCUTANEOUS EVERY 8 HOURS
Status: DISCONTINUED | OUTPATIENT
Start: 2020-09-14 | End: 2020-09-14 | Stop reason: HOSPADM

## 2020-09-14 RX ORDER — ACETAMINOPHEN 650 MG/1
650 SUPPOSITORY RECTAL
Status: DISCONTINUED | OUTPATIENT
Start: 2020-09-14 | End: 2020-09-14 | Stop reason: HOSPADM

## 2020-09-14 RX ADMIN — METOCLOPRAMIDE 10 MG: 5 INJECTION, SOLUTION INTRAMUSCULAR; INTRAVENOUS at 18:19

## 2020-09-14 RX ADMIN — LISINOPRIL 20 MG: 20 TABLET ORAL at 09:41

## 2020-09-14 RX ADMIN — FAMOTIDINE 20 MG: 20 TABLET ORAL at 09:40

## 2020-09-14 RX ADMIN — DOXYCYCLINE 100 MG: 100 INJECTION, POWDER, LYOPHILIZED, FOR SOLUTION INTRAVENOUS at 09:47

## 2020-09-14 RX ADMIN — ARFORMOTEROL TARTRATE 15 MCG: 15 SOLUTION RESPIRATORY (INHALATION) at 08:11

## 2020-09-14 RX ADMIN — ROFLUMILAST 500 MCG: 500 TABLET ORAL at 09:00

## 2020-09-14 RX ADMIN — DOCUSATE SODIUM 100 MG: 100 CAPSULE, LIQUID FILLED ORAL at 09:40

## 2020-09-14 RX ADMIN — BUDESONIDE 500 MCG: 0.5 INHALANT RESPIRATORY (INHALATION) at 08:11

## 2020-09-14 RX ADMIN — CLONIDINE HYDROCHLORIDE 0.2 MG: 0.1 TABLET ORAL at 09:40

## 2020-09-14 RX ADMIN — ASPIRIN 81 MG: 81 TABLET ORAL at 09:40

## 2020-09-14 RX ADMIN — ALUMINUM HYDROXIDE AND MAGNESIUM HYDROXIDE 15 ML: 200; 200 SUSPENSION ORAL at 18:19

## 2020-09-14 RX ADMIN — PROMETHAZINE HYDROCHLORIDE 25 MG: 25 INJECTION INTRAMUSCULAR; INTRAVENOUS at 18:19

## 2020-09-14 RX ADMIN — METHYLPREDNISOLONE SODIUM SUCCINATE 60 MG: 125 INJECTION, POWDER, FOR SOLUTION INTRAMUSCULAR; INTRAVENOUS at 05:47

## 2020-09-14 RX ADMIN — CITALOPRAM HYDROBROMIDE 20 MG: 20 TABLET ORAL at 09:41

## 2020-09-14 RX ADMIN — LIDOCAINE HYDROCHLORIDE 15 ML: 20 SOLUTION ORAL; TOPICAL at 18:19

## 2020-09-14 RX ADMIN — HYDROCHLOROTHIAZIDE 25 MG: 25 TABLET ORAL at 09:41

## 2020-09-14 RX ADMIN — ASPIRIN 81 MG CHEWABLE TABLET 162 MG: 81 TABLET CHEWABLE at 18:19

## 2020-09-14 RX ADMIN — FAMOTIDINE 20 MG: 20 TABLET ORAL at 18:19

## 2020-09-14 RX ADMIN — SODIUM CHLORIDE 75 ML/HR: 900 INJECTION, SOLUTION INTRAVENOUS at 05:48

## 2020-09-14 RX ADMIN — ISOSORBIDE MONONITRATE 30 MG: 30 TABLET, EXTENDED RELEASE ORAL at 09:40

## 2020-09-14 RX ADMIN — METOPROLOL TARTRATE 12.5 MG: 25 TABLET, FILM COATED ORAL at 09:41

## 2020-09-14 RX ADMIN — AMLODIPINE BESYLATE 10 MG: 10 TABLET ORAL at 09:40

## 2020-09-14 NOTE — ED NOTES
TRANSFER - OUT REPORT:    Verbal report given to [facility]) on Rufus Taylor  being transferred to [address] [place] [address](unit) for routine progression of care       Report consisted of patients Situation, Background, Assessment and   Recommendations(SBAR). Information from the following report(s) SBAR, ED Summary and MAR was reviewed with the receiving nurse. Lines:   Peripheral IV 09/13/20 Right Hand (Active)        Opportunity for questions and clarification was provided. Patient transported with:   Registered Nurse     Patient has her belongings with her: a dress, purse (contents unknown), cell phone, patient also states that she has misplaced her Bible. Last known location checked but Bible was not found.

## 2020-09-14 NOTE — H&P
HISTORY & PHYSICAL      Patient: Ai Wilhelm MRN: [id number]  CSN: [id number]    YOB: 1965  Age: 63 y.o. Sex: female    DOA: 9/13/2020 LOS:  LOS: 0 days        DOA: 9/13/2020        Assessment/Plan     Active Problems:    Acute respiratory distress (9/7/2020)      COPD exacerbation ([facility] (9/7/2020)      Acute exacerbation of chronic obstructive pulmonary disease (COPD) ([facility] (9/14/2020)        Plan:  1. COPD exacerbation IV steroids, IV antibiotics, bronchodilators  2. Chronic respiratory failurecontinue supplemental oxygen  3. Noncompliance with medicationspatient has signed out multiple times [address] however she has agreed to stay at this time. 4. History of cocaine useUDS positive for methadone and opiates  5. History of opiate abusecurrently on methadone 100 mg daily from [facility]need to verify dose. 6. History of hypertensionresume home medications, monitor blood pressure  DVT prophylaxisHeparin  Full code              HPI:     Ai Wilhelm is a 63 y.o. female who has a past medical history of COPD/asthma, hypertension, hyperlipidemia, cocaine use, chronic opiate use currently on methadone, noncompliance with medications presents to the emergency room secondary to shortness of breath. Patient was seen as recently as last week in the ER where she signed out [address] Patient mentions that her shortness of breath has not gotten better and she came back to the ER. This time she has decided to stay. ER evaluationpatient noted to be actively wheezing despite multiple nebulizer treatment, she appeared to be visibly short of breath and is currently on 3 L nasal cannula supplemental oxygen. Chest x-ray reviewedappears to be within normal limits. Patient has been started on IV steroids, IV antibiotics, home medications resumed, will be admitted to the telemetry unit for further evaluation and treatment.     Past Medical History:   Diagnosis Date    CHF (congestive heart failure) ([facility])     Chronic respiratory failure with hypoxia ([address] 9/7/2020    CKD (chronic kidney disease) stage 3, GFR 30-59 ml/min ([facility]) 10/31/2019    Cocaine abuse ([address] 11/26/2017    COPD (chronic obstructive pulmonary disease) with chronic bronchitis ([address] 12/19/2017    Dependence on supplemental oxygen     Depression 3/22/2020    Drug-seeking behavior 11/19/2016    Endocrine disease     thyroid issues    Essential hypertension 3/10/2017    Fe deficiency anemia 10/27/2012    Fibromyalgia 12/16/2016    Gastroesophageal reflux disease without esophagitis 10/10/2016    Gastrointestinal disorder     \"blockage in my stomach\"    Hypercholesterolemia     Hypertension     Hypertensive heart failure ([address] 12/19/2017    Morbid obesity due to excess calories ([address] 3/10/2017    NSTEMI (non-ST elevated myocardial infarction) ([address] 3/22/2020    On home O2 12/3/2015    Overview:  2L at home per pt for approx 8 years    Polysubstance abuse ([address] 9/15/2018    Respiratory failure ([address] 1/11/2017    S/P hernia repair 10/31/2019    Sleep apnea 12/19/2017    T wave inversion in EKG 3/9/2019    Tobacco use 6/08/9808    Uncomplicated severe persistent asthma 12/13/2016    Vocal cord disease 12/7/2016       Past Surgical History:   Procedure Laterality Date    HX GI      Exploratory laparotomy with lysis of adhesions and primary repair of incarcerated umbilical hernia       History reviewed. No pertinent family history.     Social History     Socioeconomic History    Marital status: SINGLE     Spouse name: Not on file    Number of children: Not on file    Years of education: Not on file    Highest education level: Not on file   Tobacco Use    Smoking status: Current Every Day Smoker     Packs/day: 0.25    Smokeless tobacco: Current User   Substance and Sexual Activity    Alcohol use: No     Comment: socially    Drug use: Not Currently     Types: Heroin    Sexual activity: Not Currently     Comment: last used 9 years ago       Prior to Admission medications    Medication Sig Start Date End Date Taking? Authorizing Provider   albuterol (PROVENTIL HFA, VENTOLIN HFA, PROAIR HFA) 90 mcg/actuation inhaler Take 2 Puffs by inhalation every four (4) hours as needed for Wheezing or Shortness of Breath for up to 5 days. 9/12/20 9/17/20  Robyn Lopez NP   predniSONE (STERAPRED DS) 10 mg dose pack 6 day dose pack per package instructions 9/12/20   Robyn Lopez NP   citalopram (CeleXA) 20 mg tablet 1 tablet 12/17/18   Provider, Historical   gemfibroziL (LOPID) 600 mg tablet 1 tablet 7/24/19   Provider, Historical   hydroCHLOROthiazide (HYDRODIURIL) 25 mg tablet 1 tablet in the morning    Provider, Historical   hydrOXYzine HCL (ATARAX) 50 mg tablet 1 tablet as needed    Provider, Historical   isosorbide mononitrate ER (IMDUR) 30 mg tablet Take 30 mg by mouth daily. 8/8/20   Provider, Historical   lisinopriL (PRINIVIL, ZESTRIL) 20 mg tablet 1 tablet 2/8/19   Provider, Historical   sucralfate (CARAFATE) 100 mg/mL suspension Take 1,000 mg by mouth. 12/17/18   Provider, Historical   QUEtiapine (SEROquel) 50 mg tablet TAKE 1 TABLET BY MOUTH AT BEDTIME FOR MOOD 7/5/20   Provider, Historical   albuterol-ipratropium (DUO-NEB) 2.5 mg-0.5 mg/3 ml nebu 3 mL by Nebulization route every four (4) hours as needed for Wheezing. 9/6/20   Miriam Hanley MD   doxycycline (VIBRA-TABS) 100 mg tablet Take 1 Tab by mouth two (2) times a day for 7 days. 9/6/20 9/13/20  Miriam Hanley MD   albuterol sulfate 90 mcg/actuation aebs Take 2 Puffs by inhalation every four to six (4-6) hours as needed for Wheezing. 7/28/20   Luis Miguel Bowser MD   furosemide (Lasix) 20 mg tablet Take 1 Tab by mouth daily. 7/20/20   Deborah Norton MD   cloNIDine HCL (CATAPRES) 0.2 mg tablet Take 1 Tab by mouth two (2) times a day.  6/22/20   Nick Zarate MD   amLODIPine (NORVASC) 10 mg tablet Take 1 Tab by mouth daily. 6/22/20   Nick Zarate MD   ALPRAZolam (Xanax) 0.5 mg tablet Take 1 Tab by mouth every eight (8) hours as needed for Anxiety. Max Daily Amount: 1.5 mg. 6/16/20   Ezequiel Kearns MD   aspirin delayed-release 81 mg tablet Take 1 Tab by mouth daily. 3/27/20   Yuriy Keith MD   famotidine (PEPCID) 20 mg tablet Take 1 Tab by mouth daily. 3/26/20   Yuriy Keith MD   OXYGEN-AIR DELIVERY SYSTEMS 3 L by IntraNASal route as needed for Other (sob). Provider, Angie   docusate sodium (COLACE) 100 mg capsule Take 1 Cap by mouth two (2) times a day. 10/17/19   Anjali Clayton MD   fluticasone furoate-vilanterol (BREO ELLIPTA) 200-25 mcg/dose inhaler Take 1 Puff by inhalation daily. Other, MD Shalini   arformoterol (BROVANA) 15 mcg/2 mL nebu neb solution 2 mL by Nebulization route two (2) times a day. 3/14/19   Andrew Samuels MD   budesonide (PULMICORT) 0.5 mg/2 mL nbsp 2 mL by Nebulization route two (2) times a day. 3/14/19   Andrew Samuels MD   tiotropium (SPIRIVA) 18 mcg inhalation capsule Take 1 Cap by inhalation daily. 3/14/19   Andrew Samuels MD   atorvastatin (LIPITOR) 20 mg tablet Take 1 Tab by mouth nightly. 3/14/19   Andrew Samuels MD   metoprolol tartrate (LOPRESSOR) 25 mg tablet Take 0.5 Tabs by mouth every twelve (12) hours. 3/14/19   Andrew Samuels MD   roflumilast (DALIRESP) 500 mcg tab tablet Take 1 Tab by mouth daily. 3/15/19   Andrew Samuels MD   naloxone [facility]) 4 mg/actuation nasal spray Use 1 spray intranasally, then discard. Repeat with new spray every 2 min as needed for opioid overdose symptoms, alternating nostrils. 3/14/19   Andrew Samuels MD   montelukast (SINGULAIR) 10 mg tablet Take 1 Tab by mouth nightly. 3/9/17   Kee Byrd MD       No Known Allergies    Review of Systems  A comprehensive review of systems was negative except for that written in the History of Present Illness.              Physical Exam:      Visit Vitals  BP 135/78   Pulse 63   Temp 97.4 °F (36.3 °C)   Resp 10   SpO2 99%       Physical Exam:    Gen: In general, this is a well nourished female in no acute distress  HEENT: Sclerae nonicteric. Oral mucous membranes moist. Dentition poor  Neck: Supple with midline trachea. CV: RRR without murmur or rub appreciated. Resp: Bilateral expiratory wheezing  Abd: Soft, nontender, nondistended. Extrem: Extremities are warm, without cyanosis or clubbing. No pitting pretibial edema. Skin: Warm, no visible rashes. Neuro: Patient is alert, oriented, and cooperative. No obvious focal defects. Moves all 4 extremities. Labs Reviewed:    Recent Results (from the past 24 hour(s))   EKG, 12 LEAD, INITIAL    Collection Time: 09/13/20  6:44 PM   Result Value Ref Range    Ventricular Rate 60 BPM    Atrial Rate 60 BPM    P-R Interval 122 ms    QRS Duration 94 ms    Q-T Interval 426 ms    QTC Calculation (Bezet) 426 ms    Calculated P Axis 79 degrees    Calculated R Axis 60 degrees    Calculated T Axis 52 degrees    Diagnosis       Normal sinus rhythm  Possible Left atrial enlargement  Borderline ECG  When compared with ECG of 12-SEP-2020 16:01,  No significant change was found     CBC WITH AUTOMATED DIFF    Collection Time: 09/13/20  8:45 PM   Result Value Ref Range    WBC 11.1 4.6 - 13.2 K/uL    RBC 4.48 4.20 - 5.30 M/uL    HGB 13.0 12.0 - 16.0 g/dL    HCT 40.7 35.0 - 45.0 %    MCV 90.8 74.0 - 97.0 FL    MCH 29.0 24.0 - 34.0 PG    MCHC 31.9 31.0 - 37.0 g/dL    RDW 13.3 11.6 - 14.5 %    PLATELET 926 596 - 413 K/uL    MPV 10.3 9.2 - 11.8 FL    NEUTROPHILS 57 40 - 73 %    LYMPHOCYTES 35 21 - 52 %    MONOCYTES 7 3 - 10 %    EOSINOPHILS 1 0 - 5 %    BASOPHILS 0 0 - 2 %    ABS. NEUTROPHILS 6.3 1.8 - 8.0 K/UL    ABS. LYMPHOCYTES 3.9 (H) 0.9 - 3.6 K/UL    ABS. MONOCYTES 0.8 0.05 - 1.2 K/UL    ABS. EOSINOPHILS 0.1 0.0 - 0.4 K/UL    ABS.  BASOPHILS 0.0 0.0 - 0.1 K/UL    DF AUTOMATED     METABOLIC PANEL, BASIC    Collection Time: 09/13/20  8:45 PM   Result Value Ref Range    Sodium 141 136 - 145 mmol/L    Potassium 4.6 3.5 - 5.5 mmol/L    Chloride 108 100 - 111 mmol/L    CO2 31 21 - 32 mmol/L    Anion gap 2 (L) 3.0 - 18 mmol/L    Glucose 92 74 - 99 mg/dL    BUN 22 (H) 7.0 - 18 MG/DL    Creatinine 1.71 (H) 0.6 - 1.3 MG/DL    BUN/Creatinine ratio 13 12 - 20      GFR est AA 37 (L) >60 ml/min/1.73m2    GFR est non-AA 31 (L) >60 ml/min/1.73m2    Calcium 8.6 8.5 - 10.1 MG/DL   CARDIAC PANEL,(CK, CKMB & TROPONIN)    Collection Time: 09/13/20  8:45 PM   Result Value Ref Range    CK - MB 2.0 <3.6 ng/ml    CK-MB Index 2.0 0.0 - 4.0 %     26 - 192 U/L    Troponin-I, QT <0.02 0.0 - 0.045 NG/ML   NT-PRO BNP    Collection Time: 09/13/20  8:45 PM   Result Value Ref Range    NT pro- 0 - 900 PG/ML       Imaging Reviewed:    XR Results (most recent):  Results from Hospital Encounter encounter on 09/12/20   XR CHEST PORT    Narrative EXAM: CHEST ONE VIEW  portable 1718 hours    CLINICAL HISTORY/INDICATION: sob nonresponsive to home breathing treatments  times several days    COMPARISON: Chest x-ray September 7, 2020. TECHNIQUE: One view obtained. FINDINGS:     The cardiac and mediastinal silhouette is normal. The lungs are clear. Pulmonary  vascularity is normal. The costophrenic angles are sharply defined. No bony  abnormalities are seen. Impression IMPRESSION:    Negative chest.                Kristina Piper MD  9/14/2020, 1:23 AM        Disclaimer: Sections of this note are dictated using utilizing voice recognition software. Minor typographical errors may be present. If questions arise, please do not hesitate to contact me or call our department.

## 2020-09-14 NOTE — ROUTINE PROCESS
Bedside shift change report given to Abdifatah Andrew RN (oncoming nurse) by Best Gavin RN and Krystle Gould RN (offgoing nurse). Report included the following information SBAR, Kardex, Intake/Output and MAR.

## 2020-09-14 NOTE — ROUTINE PROCESS
Pt refusing all medical care. Pt requesting escort from [facility] CNA to front entrance via wheelchair and with 3 L 02. Yeni Colon in hallway with pt to educate importance of medical care and advising pt against AMA. Pt refused to discuss staying.

## 2020-09-14 NOTE — ED TRIAGE NOTES
Pt brought in by EMS to triage. Per EMS report patient was discharged this AM from inpatient. Pt reports while arguing with family felt a sharp pain in her chest. Pt reports feeling better now and denies pain.

## 2020-09-14 NOTE — PROGRESS NOTES
Called pt son per pt request. Mace Zack son pt was admitted at [facility] and room number. Son requested no further info.

## 2020-09-14 NOTE — ED PROVIDER NOTES
EMERGENCY DEPARTMENT HISTORY AND PHYSICAL EXAM    8:19 PM      Date: 9/13/2020  Patient Name: Ebenezer Workman    History of Presenting Illness     Chief Complaint   Patient presents with    Chest Pain         History Provided By: Patient    Additional History (Context): Ebneezer Workman is a 63 y.o. female with Past medical history of congestive heart failure, chronic kidney disease, cocaine abuse, COPD, dependence on supplemental oxygen, drug-seeking behavior, hypertension, GERD who presents with chief complaint of chest tightness for the past week. Associated symptoms are wheezing, and cough. Patient states that she is having to use more oxygen at home lately, usually 2 L but sometimes up to 5 L. Denies fever, vomiting, diarrhea, abdominal pain, back pain, leg pain, sick contacts, and no exposure to anyone with COVID-19. Patient does admit that she was just admitted here at [facility] last week for COPD but left [address]     PCP: Dionisio Ramachandran NP        Past History     Past Medical History:  Past Medical History:   Diagnosis Date    CHF (congestive heart failure) ([address]     Chronic respiratory failure with hypoxia ([address] 9/7/2020    CKD (chronic kidney disease) stage 3, GFR 30-59 ml/min ([address] 10/31/2019    Cocaine abuse ([address] 11/26/2017    COPD (chronic obstructive pulmonary disease) with chronic bronchitis ([address] 12/19/2017    Dependence on supplemental oxygen     Depression 3/22/2020    Drug-seeking behavior 11/19/2016    Endocrine disease     thyroid issues    Essential hypertension 3/10/2017    Fe deficiency anemia 10/27/2012    Fibromyalgia 12/16/2016    Gastroesophageal reflux disease without esophagitis 10/10/2016    Gastrointestinal disorder     \"blockage in my stomach\"    Hypercholesterolemia     Hypertension     Hypertensive heart failure ([address] 12/19/2017    Morbid obesity due to excess calories ([address] 3/10/2017    NSTEMI (non-ST elevated myocardial infarction) ([address] 3/22/2020    On home O2 12/3/2015    Overview:  2L at home per pt for approx 8 years    Polysubstance abuse ([facility] 9/15/2018    Respiratory failure ([facility] 1/11/2017    S/P hernia repair 10/31/2019    Sleep apnea 12/19/2017    T wave inversion in EKG 3/9/2019    Tobacco use 6/31/0916    Uncomplicated severe persistent asthma 12/13/2016    Vocal cord disease 12/7/2016       Past Surgical History:  Past Surgical History:   Procedure Laterality Date    HX GI      Exploratory laparotomy with lysis of adhesions and primary repair of incarcerated umbilical hernia       Family History:  History reviewed. No pertinent family history. Social History:  Social History     Tobacco Use    Smoking status: Current Every Day Smoker     Packs/day: 0.25    Smokeless tobacco: Current User   Substance Use Topics    Alcohol use: No     Comment: socially    Drug use: Not Currently     Types: Heroin       Allergies:  No Known Allergies      Review of Systems       Review of Systems   Constitutional: Negative for chills and fever. HENT: Negative for congestion, rhinorrhea, sore throat and trouble swallowing. Eyes: Negative. Respiratory: Positive for cough, chest tightness, shortness of breath and wheezing. Negative for stridor. Cardiovascular: Positive for leg swelling. Negative for chest pain. Gastrointestinal: Negative for abdominal pain, nausea and vomiting. Endocrine: Negative. Genitourinary: Negative for difficulty urinating and dysuria. Musculoskeletal: Negative for arthralgias, myalgias, neck pain and neck stiffness. Skin: Negative for pallor and rash. Neurological: Negative for dizziness, weakness, numbness and headaches. Hematological: Does not bruise/bleed easily. Psychiatric/Behavioral: Negative for confusion and dysphoric mood. All other systems reviewed and are negative.         Physical Exam     Visit Vitals  /63 (BP 1 Location: Left arm, BP Patient Position: At rest)   Pulse (!) 55   Temp 97.4 °F (36.3 °C)   Resp 20   LMP 04/14/2009   SpO2 97%         Physical Exam  Vitals signs and nursing note reviewed. Constitutional:       General: She is not in acute distress. Appearance: She is well-developed. She is ill-appearing. She is not diaphoretic. HENT:      Head: Normocephalic and atraumatic. Eyes:      General: No scleral icterus. Conjunctiva/sclera: Conjunctivae normal.      Pupils: Pupils are equal, round, and reactive to light. Neck:      Musculoskeletal: Normal range of motion and neck supple. No muscular tenderness. Trachea: No tracheal deviation. Cardiovascular:      Rate and Rhythm: Bradycardia present. Heart sounds: No murmur. No gallop. Comments: Capillary refill < 3 seconds  Pulmonary:      Effort: Accessory muscle usage and respiratory distress present. Breath sounds: No stridor. Examination of the right-middle field reveals decreased breath sounds and wheezing. Examination of the left-middle field reveals decreased breath sounds and wheezing. Examination of the right-lower field reveals decreased breath sounds and wheezing. Examination of the left-lower field reveals decreased breath sounds and wheezing. Decreased breath sounds and wheezing present. No rales. Abdominal:      General: Bowel sounds are normal. There is no distension. Palpations: Abdomen is soft. Tenderness: There is no abdominal tenderness. Musculoskeletal: Normal range of motion. General: No tenderness. Right lower leg: She exhibits no tenderness. Left lower leg: She exhibits no tenderness. Comments: Minimal bilateral lower extremity edema    Has symmetric dorsalis pedal pulse with normal capillary refill   Skin:     General: Skin is warm and dry. Capillary Refill: Capillary refill takes less than 2 seconds. Coloration: Skin is not cyanotic, jaundiced or pale. Neurological:      General: No focal deficit present. Mental Status: She is alert and oriented to person, place, and time. Cranial Nerves: No cranial nerve deficit. Motor: No weakness. Psychiatric:         Behavior: Behavior normal.           Diagnostic Study Results     Labs -  Recent Results (from the past 12 hour(s))   EKG, 12 LEAD, INITIAL    Collection Time: 09/13/20  6:44 PM   Result Value Ref Range    Ventricular Rate 60 BPM    Atrial Rate 60 BPM    P-R Interval 122 ms    QRS Duration 94 ms    Q-T Interval 426 ms    QTC Calculation (Bezet) 426 ms    Calculated P Axis 79 degrees    Calculated R Axis 60 degrees    Calculated T Axis 52 degrees    Diagnosis       Normal sinus rhythm  Possible Left atrial enlargement  Borderline ECG  When compared with ECG of 12-SEP-2020 16:01,  No significant change was found     CBC WITH AUTOMATED DIFF    Collection Time: 09/13/20  8:45 PM   Result Value Ref Range    WBC 11.1 4.6 - 13.2 K/uL    RBC 4.48 4.20 - 5.30 M/uL    HGB 13.0 12.0 - 16.0 g/dL    HCT 40.7 35.0 - 45.0 %    MCV 90.8 74.0 - 97.0 FL    MCH 29.0 24.0 - 34.0 PG    MCHC 31.9 31.0 - 37.0 g/dL    RDW 13.3 11.6 - 14.5 %    PLATELET 522 800 - 235 K/uL    MPV 10.3 9.2 - 11.8 FL    NEUTROPHILS 57 40 - 73 %    LYMPHOCYTES 35 21 - 52 %    MONOCYTES 7 3 - 10 %    EOSINOPHILS 1 0 - 5 %    BASOPHILS 0 0 - 2 %    ABS. NEUTROPHILS 6.3 1.8 - 8.0 K/UL    ABS. LYMPHOCYTES 3.9 (H) 0.9 - 3.6 K/UL    ABS. MONOCYTES 0.8 0.05 - 1.2 K/UL    ABS. EOSINOPHILS 0.1 0.0 - 0.4 K/UL    ABS.  BASOPHILS 0.0 0.0 - 0.1 K/UL    DF AUTOMATED     METABOLIC PANEL, BASIC    Collection Time: 09/13/20  8:45 PM   Result Value Ref Range    Sodium 141 136 - 145 mmol/L    Potassium 4.6 3.5 - 5.5 mmol/L    Chloride 108 100 - 111 mmol/L    CO2 31 21 - 32 mmol/L    Anion gap 2 (L) 3.0 - 18 mmol/L    Glucose 92 74 - 99 mg/dL    BUN 22 (H) 7.0 - 18 MG/DL    Creatinine 1.71 (H) 0.6 - 1.3 MG/DL    BUN/Creatinine ratio 13 12 - 20      GFR est AA 37 (L) >60 ml/min/1.73m2    GFR est non-AA 31 (L) >60 ml/min/1.73m2    Calcium 8.6 8.5 - 10.1 MG/DL   CARDIAC PANEL,(CK, CKMB & TROPONIN)    Collection Time: 09/13/20  8:45 PM   Result Value Ref Range    CK - MB 2.0 <3.6 ng/ml    CK-MB Index 2.0 0.0 - 4.0 %     26 - 192 U/L    Troponin-I, QT <0.02 0.0 - 0.045 NG/ML   NT-PRO BNP    Collection Time: 09/13/20  8:45 PM   Result Value Ref Range    NT pro- 0 - 900 PG/ML       Radiologic Studies -   XR CHEST PORT    (Results Pending)   Per my preliminary read: No acute process  Les Mix DO      Medical Decision Making   I am the first provider for this patient. I reviewed the vital signs, available nursing notes, past medical history, past surgical history, family history and social history. Vital Signs-Reviewed the patient's vital signs. Pulse Oximetry Analysis -  97% on 3L of O2 via NC (Interpretation) normal    Cardiac Monitor:  Rate: 58  Rhythm: Sinus bradycardia    EKG: Interpreted by the EP dr Chelsea Dugan: 60   Rhythm: NSR   Interpretation: normal qrs duration, normal qtc, no loretta and no st depression       Records Reviewed: Nursing Notes and Old Medical Records (Time of Review: 8:19 PM)    Provider Notes (Medical Decision Making): DDX: COPD exacerbation, asthma, pneumonia, CHF, history of cocaine abuse, methadone on recent drug screen last week    Patient is in respiratory distress with diffuse wheezing.     Patient is placed on O2 nasal cannula 3 L and O2 sat 97%, will give nebs steroids and Lasix get chest x-ray, labs, EKG    May need admission    MDM    Medications   albuterol-ipratropium (DUO-NEB) 2.5 MG-0.5 MG/3 ML (3 mL Nebulization Given 9/13/20 2110)   albuterol-ipratropium (DUO-NEB) 2.5 MG-0.5 MG/3 ML (3 mL Nebulization Given 9/13/20 2208)   methylPREDNISolone (PF) (Solu-MEDROL) injection 125 mg (125 mg IntraVENous Given 9/13/20 2112)   furosemide (LASIX) injection 40 mg (40 mg IntraVENous Given 9/13/20 2114)   albuterol-ipratropium (DUO-NEB) 2.5 MG-0.5 MG/3 ML (3 mL Nebulization Given 9/13/20 2244)   magnesium sulfate 2 g/50 ml IVPB (premix or compounded) (2 g IntraVENous New Bag 9/13/20 2244)           ED Course: Progress Notes, Reevaluation, and Consults:  WBC within normal limits  Cardiac enzymes unremarkable  proBNP normal    Reassessed patient and still with diffuse wheezing, will give more nebs and magnesium added    Patient repeatedly requesting pain medication. I have told her that I would not give her any narcotics. Patient with drug abuse, drug-seeking behavior    Despite multiple duo nebs, steroids, magnesium and Lasix, patient still in respiratory distress with diffuse wheezing. Will consult hospitalist for admission    Consult:  Discussed care with Dr. Nancy Shah, Specialty: Hospitalist, standard discussion; including history of patients chief complaint, available diagnostic results, and treatment course. He states he will come down to the ED there to evaluate patient and determine disposition. 11:50 PM, 9/13/2020     12:59 AM  Dr. Nancy Shah in the ED to evaluate patient. States he will accept admission to telemetry    I discussed diagnosis, results and plan with patient who agrees. She states that she will stay in the hospital to be treated this time and will not leave like she did AMA last week. Diagnosis     Clinical Impression:   1. Acute exacerbation of chronic obstructive pulmonary disease (COPD) ([facility]    2. Acute respiratory distress        Disposition: admitted    Follow-up Information    None          Patient's Medications   Start Taking    No medications on file   Continue Taking    ALBUTEROL (PROVENTIL HFA, VENTOLIN HFA, PROAIR HFA) 90 MCG/ACTUATION INHALER    Take 2 Puffs by inhalation every four (4) hours as needed for Wheezing or Shortness of Breath for up to 5 days. ALBUTEROL SULFATE 90 MCG/ACTUATION AEBS    Take 2 Puffs by inhalation every four to six (4-6) hours as needed for Wheezing.     ALBUTEROL-IPRATROPIUM (DUO-NEB) 2.5 MG-0.5 MG/3 ML NEBU    3 mL by Nebulization route every four (4) hours as needed for Wheezing. ALPRAZOLAM (XANAX) 0.5 MG TABLET    Take 1 Tab by mouth every eight (8) hours as needed for Anxiety. Max Daily Amount: 1.5 mg.    AMLODIPINE (NORVASC) 10 MG TABLET    Take 1 Tab by mouth daily. ARFORMOTEROL (BROVANA) 15 MCG/2 ML NEBU NEB SOLUTION    2 mL by Nebulization route two (2) times a day. ASPIRIN DELAYED-RELEASE 81 MG TABLET    Take 1 Tab by mouth daily. ATORVASTATIN (LIPITOR) 20 MG TABLET    Take 1 Tab by mouth nightly. BUDESONIDE (PULMICORT) 0.5 MG/2 ML NBSP    2 mL by Nebulization route two (2) times a day. CITALOPRAM (CELEXA) 20 MG TABLET    1 tablet    CLONIDINE HCL (CATAPRES) 0.2 MG TABLET    Take 1 Tab by mouth two (2) times a day. DOCUSATE SODIUM (COLACE) 100 MG CAPSULE    Take 1 Cap by mouth two (2) times a day. DOXYCYCLINE (VIBRA-TABS) 100 MG TABLET    Take 1 Tab by mouth two (2) times a day for 7 days. FAMOTIDINE (PEPCID) 20 MG TABLET    Take 1 Tab by mouth daily. FLUTICASONE FUROATE-VILANTEROL (BREO ELLIPTA) 200-25 MCG/DOSE INHALER    Take 1 Puff by inhalation daily. FUROSEMIDE (LASIX) 20 MG TABLET    Take 1 Tab by mouth daily. GEMFIBROZIL (LOPID) 600 MG TABLET    1 tablet    HYDROCHLOROTHIAZIDE (HYDRODIURIL) 25 MG TABLET    1 tablet in the morning    HYDROXYZINE HCL (ATARAX) 50 MG TABLET    1 tablet as needed    ISOSORBIDE MONONITRATE ER (IMDUR) 30 MG TABLET    Take 30 mg by mouth daily. LISINOPRIL (PRINIVIL, ZESTRIL) 20 MG TABLET    1 tablet    METOPROLOL TARTRATE (LOPRESSOR) 25 MG TABLET    Take 0.5 Tabs by mouth every twelve (12) hours. MONTELUKAST (SINGULAIR) 10 MG TABLET    Take 1 Tab by mouth nightly. NALOXONE (NARCAN) 4 MG/ACTUATION NASAL SPRAY    Use 1 spray intranasally, then discard. Repeat with new spray every 2 min as needed for opioid overdose symptoms, alternating nostrils. OXYGEN-AIR DELIVERY SYSTEMS    3 L by IntraNASal route as needed for Other (sob).     PREDNISONE (STERAPRED DS) 10 MG DOSE PACK    6 day dose pack per package instructions    QUETIAPINE (SEROQUEL) 50 MG TABLET    TAKE 1 TABLET BY MOUTH AT BEDTIME FOR MOOD    ROFLUMILAST (DALIRESP) 500 MCG TAB TABLET    Take 1 Tab by mouth daily. SUCRALFATE (CARAFATE) 100 MG/ML SUSPENSION    Take 1,000 mg by mouth. TIOTROPIUM (SPIRIVA) 18 MCG INHALATION CAPSULE    Take 1 Cap by inhalation daily. These Medications have changed    No medications on file   Stop Taking    No medications on file         DO Blaine Tijerina medical dictation software was used for portions of this report. Unintended transcription errors may occur. My signature above authenticates this document and my orders, the final    diagnosis (es), discharge prescription (s), and instructions in the Epic    record.

## 2020-09-14 NOTE — PROGRESS NOTES
Reason for Readmission:    Acute exacerbation of chronic obstructive pulmonary disease (COPD) ([facility]) [J44.1]  Acute respiratory distress [R06.03]  COPD exacerbation (HCC) [J44.1]         RUR Score and Risk Level:     97%/HIGH     Level of Readmission:    1   (1 - First Readmission, 2- Second Readmission within 30 days or multiple admissions over previous 90 days, 3- Greater than two readmissions within 30 days or multiple admissions over previous 90 days)      Care Conference scheduled:   NO       Resources/supports as identified by patient/family:   Family supports    Top Challenges facing patient (as identified by patient/family and CM):     Pt lives alone    Finances/Medication cost?     No     Transportation      Medicaid transport. Support system or lack thereof? Daughter, friends    Living arrangements? Lives alone     Self-care/ADLs/Cognition? Needs assistance with ADLs        Current Advanced Directive/Advance Care Plan:   no                          Plan for utilizing home health:    Patient has no home health orders, in place, at this time. This writer will continue to monitor for potential home health needs and orders. Likelihood of readmission:   HIGH    Transition of Care Plan:  Patient plans to return home with help from her family. Medicaid transport will transport her home, upon discharge. Initial assessment completed with patient. Cognitive status of patient: oriented to time, place, person and situation. Face sheet information confirmed:  yes. The patient designates her daughter Diamante Blake to participate in her discharge plan and to receive any needed information. This patient lives in a house, alone . Patient is not able to navigate steps as needed. Prior to hospitalization, patient was considered to be independent with ADLs/IADLS : yes . Patient has a current ACP document on file: no. Patient declined to complete an ACP.     Medicaid transportation will be available to transport patient home upon discharge. The patient already has a cane, a walker, a wheelchair, and 2L oxygen supplied by [employer], available in the home. Patient is not currently active with home health. Patient has not stayed in a skilled nursing facility or rehab. Was  stay within last 60 days : no. This patient is on dialysis :no.    Currently, the discharge plan is for patient to return home with help from her family. Medicaid transport will transport her home, upon discharge. Patient's daughter is Rudy Montiel, PV#[phone] and Faroese#[phone]). Patient's friend is Joseluis Recio, XS#[phone]). Patient's PCP is Claretha Meigs, NP. She is insured through [employer]. The patient states that she can obtain her medications from the [address] [place]), and take her medications as directed. This writer will continue to monitor for discharge planning to ensure a safe discharge home from [facility].     Readmission Assessment  Number of days since last admission?: 1-7 days  Previous disposition: Home with Family  Who is being interviewed?: Patient  What was the patient's/caregiver's perception as to why they think they needed to return back to the hospital?: Other (Comment)(Couldn't breathe)  Did you visit your Primary Care Physician after you left the hospital, before you returned this time?: No  Why weren't you able to visit your PCP?: Did not have an appointment  Did you see a specialist, such as Cardiac, Pulmonary, Orthopedic Physician, etc. after you left the hospital?: No  Who advised the patient to return to the hospital?: Self-referral  Does the patient report anything that got in the way of taking their medications?: No  In our efforts to provide the best possible care to you and others like you, can you think of anything that we could have done to help you after you left the hospital the first time, so that you might not have needed to return so soon?: Other (Comment)(Nothing. She was having a hard time breathing.)    Care Management Interventions  PCP Verified by CM: Tim Stanley NP)  Palliative Care Criteria Met (RRAT>21 & CHF Dx)?: No  Mode of Transport at Discharge: Other (see comment)(Medicaid transport)  Transition of Care Consult (CM Consult): Discharge Planning  Current Support Network: Lives Alone, Family Lives Nearby  Confirm Follow Up Transport: Other (see comment)(Medicaid transport)  Discharge Location  Discharge Placement: Home with family assistance      Berenice Espinoza MSW  Care Manager  Pager#: [phone]

## 2020-09-14 NOTE — DISCHARGE INSTRUCTIONS
Patient Education        Chronic Obstructive Pulmonary Disease (COPD) Flare-Ups: Care Instructions  Your Care Instructions     Chronic obstructive pulmonary disease (COPD) is a lung disease that makes it hard to breathe. It is caused by damage to the lungs over many years, usually from smoking. COPD is often a mix of two diseases:  · Chronic bronchitis: The airways that carry air to the lungs (bronchial tubes) get inflamed and make a lot of mucus. This can narrow or block the airways. · Emphysema: In a healthy person, the tiny air sacs in the lungs are like balloons. As you breathe in and out, they get bigger and smaller to move air through your lungs. But with emphysema, these air sacs are damaged and lose their stretch. Less air gets in and out of the lungs. Many people with COPD have attacks called flare-ups or exacerbations. This is when your usual symptoms quickly get worse and stay worse. The doctor has checked you carefully. But problems can develop later. If you notice any problems or new symptoms, get medical treatment right away. Follow-up care is a key part of your treatment and safety. Be sure to make and go to all appointments, and call your doctor if you are having problems. It's also a good idea to know your test results and keep a list of the medicines you take. How can you care for yourself at home? · Be safe with medicines. Take your medicines exactly as prescribed. Call your doctor if you think you are having a problem with your medicine. You may be taking medicines such as:  ? Bronchodilators. These help open your airways and make breathing easier. ? Corticosteroids. These reduce airway inflammation. They may be given as pills, in a vein, or in an inhaled form. You may go home with pills in addition to an inhaler that you already use. · A spacer may help you get more inhaled medicine to your lungs. Ask your doctor or pharmacist if a spacer is right for you.  If it is, ask how to use it properly. · If your doctor prescribed antibiotics, take them as directed. Do not stop taking them just because you feel better. You need to take the full course of antibiotics. · If your doctor prescribed oxygen, use the flow rate your doctor has recommended. Do not change it without talking to your doctor first.  · Do not smoke. Smoking makes COPD worse. If you need help quitting, talk to your doctor about stop-smoking programs and medicines. These can increase your chances of quitting for good. When should you call for help? Call 911 anytime you think you may need emergency care. For example, call if:    · You have severe trouble breathing. Call your doctor now or seek immediate medical care if:    · You have new or worse trouble breathing.     · Your coughing or wheezing gets worse.     · You cough up dark brown or bloody mucus (sputum).     · You have a new or higher fever. Watch closely for changes in your health, and be sure to contact your doctor if:    · You notice more mucus or a change in the color of your mucus.     · You need to use your antibiotic or steroid pills.     · You do not get better as expected. Where can you learn more? Go to http://magda-kadie.info/  Enter D989 in the search box to learn more about \"Chronic Obstructive Pulmonary Disease (COPD) Flare-Ups: Care Instructions. \"  Current as of: February 24, 2020               Content Version: 12.6  © 7049-8224 [employer]. Care instructions adapted under license by [employer] (which disclaims liability or warranty for this information). If you have questions about a medical condition or this instruction, always ask your healthcare professional. [place] [address] any warranty or liability for your use of this information.

## 2020-09-14 NOTE — ED PROVIDER NOTES
EMERGENCY DEPARTMENT HISTORY AND PHYSICAL EXAM      Date: 9/14/2020  Patient Name: Darion Veliz    History of Presenting Illness     Chief Complaint   Patient presents with    Chest Pain       History Provided By: Patient and EMS    Chief Complaint: Chest pain    Additional History (Context): Darion Veliz is a 63 y.o. female who presents with chest pain on and off for the last week. States that it comes on with emotional upset or symptoms with exertion, last 1/2-hour to an hour at a time, is occurred almost every day for the last week, substernal, no radiation. States that today she was having argument with family members and had slightly worse than usual pain, which prompted her to call an ambulance. Today the pain only lasted about 10 minutes, which is a shorter period of time that usually lasts, however since she is been having the pain for a week now she decided to continue with coming to the emergency department for evaluation. Denies any recent illness, fevers, chills, sweats, palpitations, abdominal pain, nausea, vomiting, rash, trauma. PCP: Patrick Sharpe NP    Current Facility-Administered Medications   Medication Dose Route Frequency Provider Last Rate Last Dose    promethazine (PHENERGAN) 25 mg in 0.9% sodium chloride 50 mL IVPB  25 mg IntraVENous NOW Lev Espana MD   25 mg at 09/14/20 1819     Current Outpatient Medications   Medication Sig Dispense Refill    albuterol (PROVENTIL HFA, VENTOLIN HFA, PROAIR HFA) 90 mcg/actuation inhaler Take 2 Puffs by inhalation every four (4) hours as needed for Wheezing or Shortness of Breath for up to 5 days. 1 Inhaler 0    albuterol-ipratropium (DUO-NEB) 2.5 mg-0.5 mg/3 ml nebu 3 mL by Nebulization route every four (4) hours as needed for Wheezing, Shortness of Breath or Cough. 30 Nebule 0    arformoteroL (BROVANA) 15 mcg/2 mL nebu neb solution 2 mL by Nebulization route two (2) times a day.  Indications: bronchospasm prevention with COPD 60 Vial 0    budesonide (PULMICORT) 0.5 mg/2 mL nbsp 2 mL by Nebulization route two (2) times a day. 60 Each 0    fluticasone furoate-vilanteroL (Breo Ellipta) 200-25 mcg/dose inhaler Take 1 Puff by inhalation daily. 1 Inhaler 0    hydrOXYzine HCL (ATARAX) 50 mg tablet Take 1 Tab by mouth every eight (8) hours as needed for Itching or Anxiety for up to 7 days. 20 Tab 0    tiotropium (SPIRIVA) 18 mcg inhalation capsule Take 1 Cap by inhalation daily for 30 days. 30 Cap 0    predniSONE (DELTASONE) 20 mg tablet Take 40 mg by mouth daily for 4 days. 8 Tab 0    citalopram (CeleXA) 20 mg tablet 1 tablet      gemfibroziL (LOPID) 600 mg tablet 1 tablet      hydroCHLOROthiazide (HYDRODIURIL) 25 mg tablet 1 tablet in the morning      isosorbide mononitrate ER (IMDUR) 30 mg tablet Take 30 mg by mouth daily.  lisinopriL (PRINIVIL, ZESTRIL) 20 mg tablet 1 tablet      sucralfate (CARAFATE) 100 mg/mL suspension Take 1,000 mg by mouth.  QUEtiapine (SEROquel) 50 mg tablet TAKE 1 TABLET BY MOUTH AT BEDTIME FOR MOOD      albuterol sulfate 90 mcg/actuation aebs Take 2 Puffs by inhalation every four to six (4-6) hours as needed for Wheezing. 1 Inhaler 0    furosemide (Lasix) 20 mg tablet Take 1 Tab by mouth daily. 5 Tab 0    cloNIDine HCL (CATAPRES) 0.2 mg tablet Take 1 Tab by mouth two (2) times a day. 30 Tab 1    amLODIPine (NORVASC) 10 mg tablet Take 1 Tab by mouth daily. 15 Tab 0    ALPRAZolam (Xanax) 0.5 mg tablet Take 1 Tab by mouth every eight (8) hours as needed for Anxiety. Max Daily Amount: 1.5 mg. 20 Tab 0    aspirin delayed-release 81 mg tablet Take 1 Tab by mouth daily. 30 Tab 0    famotidine (PEPCID) 20 mg tablet Take 1 Tab by mouth daily. 30 Tab 1    OXYGEN-AIR DELIVERY SYSTEMS 3 L by IntraNASal route as needed for Other (sob).  docusate sodium (COLACE) 100 mg capsule Take 1 Cap by mouth two (2) times a day. 120 Cap 0    atorvastatin (LIPITOR) 20 mg tablet Take 1 Tab by mouth nightly.  30 Tab 0  metoprolol tartrate (LOPRESSOR) 25 mg tablet Take 0.5 Tabs by mouth every twelve (12) hours. 30 Tab 0    roflumilast (DALIRESP) 500 mcg tab tablet Take 1 Tab by mouth daily. 30 Tab 0    naloxone (NARCAN) 4 mg/actuation nasal spray Use 1 spray intranasally, then discard. Repeat with new spray every 2 min as needed for opioid overdose symptoms, alternating nostrils. 1 Each 0    montelukast (SINGULAIR) 10 mg tablet Take 1 Tab by mouth nightly.  30 Tab 1       Past History     Past Medical History:  Past Medical History:   Diagnosis Date    [facility]congestive heart failure) ([facility])     Chronic respiratory failure with hypoxia ([address] 9/7/2020    CKD (chronic kidney disease) stage 3, GFR 30-59 ml/min ([address] 10/31/2019    Cocaine abuse ([address] 11/26/2017    COPD (chronic obstructive pulmonary disease) with chronic bronchitis ([address] 12/19/2017    Dependence on supplemental oxygen     Depression 3/22/2020    Drug-seeking behavior 11/19/2016    Endocrine disease     thyroid issues    Essential hypertension 3/10/2017    Fe deficiency anemia 10/27/2012    Fibromyalgia 12/16/2016    Gastroesophageal reflux disease without esophagitis 10/10/2016    Gastrointestinal disorder     \"blockage in my stomach\"    Hypercholesterolemia     Hypertension     Hypertensive heart failure ([address] 12/19/2017    Morbid obesity due to excess calories ([address] 3/10/2017    NSTEMI (non-ST elevated myocardial infarction) ([address] 3/22/2020    On home O2 12/3/2015    Overview:  2L at home per pt for approx 8 years    Polysubstance abuse ([address] 9/15/2018    Respiratory failure ([address] 1/11/2017    S/P hernia repair 10/31/2019    Sleep apnea 12/19/2017    T wave inversion in EKG 3/9/2019    Tobacco use 3/19/7743    Uncomplicated severe persistent asthma 12/13/2016    Vocal cord disease 12/7/2016       Past Surgical History:  Past Surgical History:   Procedure Laterality Date    HX GI      Exploratory laparotomy with lysis of adhesions and primary repair of incarcerated umbilical hernia       Family History:  No family history on file. Social History:  Social History     Tobacco Use    Smoking status: Current Every Day Smoker     Packs/day: 0.25    Smokeless tobacco: Current User   Substance Use Topics    Alcohol use: No     Comment: socially    Drug use: Not Currently     Types: Heroin       Allergies:  No Known Allergies      Review of Systems   Review of Systems   Constitutional: Negative for chills, fatigue and fever. HENT: Negative for congestion, rhinorrhea, sore throat and trouble swallowing. Eyes: Negative for discharge, redness and itching. Respiratory: Negative for cough, shortness of breath, wheezing and stridor. Cardiovascular: Positive for chest pain. Negative for palpitations and leg swelling. Gastrointestinal: Negative for abdominal pain, blood in stool, diarrhea, nausea and vomiting. Genitourinary: Negative for decreased urine volume, difficulty urinating, dysuria, flank pain, frequency, hematuria and urgency. Musculoskeletal: Negative for back pain. Skin: Negative for rash. Neurological: Negative for syncope and light-headedness. Psychiatric/Behavioral: Negative for behavioral problems and confusion. All other systems reviewed and are negative. Physical Exam     Vitals:    09/14/20 1611   BP: (!) 158/75   Pulse: 65   Resp: 20   Temp: 98.1 °F (36.7 °C)   SpO2: 97%     Physical Exam  Vitals signs and nursing note reviewed. Constitutional:       General: She is not in acute distress. Appearance: She is well-developed. She is not diaphoretic. HENT:      Head: Normocephalic and atraumatic. Nose: Nose normal.      Mouth/Throat:      Mouth: Mucous membranes are moist.      Pharynx: Oropharynx is clear. Eyes:      Extraocular Movements: Extraocular movements intact. Conjunctiva/sclera: Conjunctivae normal.      Pupils: Pupils are equal, round, and reactive to light.    Neck: Musculoskeletal: Normal range of motion and neck supple. Cardiovascular:      Rate and Rhythm: Normal rate and regular rhythm. Heart sounds: Normal heart sounds. Pulmonary:      Effort: Pulmonary effort is normal.      Breath sounds: Normal breath sounds. No wheezing or rales. Abdominal:      General: Bowel sounds are normal. There is no distension. Palpations: Abdomen is soft. Tenderness: There is no abdominal tenderness. Musculoskeletal: Normal range of motion. Lymphadenopathy:      Cervical: No cervical adenopathy. Skin:     General: Skin is warm and dry. Capillary Refill: Capillary refill takes less than 2 seconds. Findings: No lesion or rash. Neurological:      General: No focal deficit present. Mental Status: She is alert and oriented to person, place, and time.    Psychiatric:         Mood and Affect: Mood normal.         Behavior: Behavior normal.           Diagnostic Study Results     Labs -     Recent Results (from the past 12 hour(s))   EKG, 12 LEAD, INITIAL    Collection Time: 09/14/20  5:19 PM   Result Value Ref Range    Ventricular Rate 58 BPM    Atrial Rate 58 BPM    P-R Interval 130 ms    QRS Duration 88 ms    Q-T Interval 418 ms    QTC Calculation (Bezet) 410 ms    Calculated P Axis 73 degrees    Calculated R Axis 57 degrees    Calculated T Axis 68 degrees    Diagnosis       Sinus bradycardia  Possible Left atrial enlargement  Nonspecific ST and T wave abnormality  Abnormal ECG  When compared with ECG of 13-SEP-2020 18:44,  No significant change was found     CBC WITH AUTOMATED DIFF    Collection Time: 09/14/20  5:29 PM   Result Value Ref Range    WBC 17.6 (H) 4.6 - 13.2 K/uL    RBC 4.70 4.20 - 5.30 M/uL    HGB 13.6 12.0 - 16.0 g/dL    HCT 41.5 35.0 - 45.0 %    MCV 88.3 74.0 - 97.0 FL    MCH 28.9 24.0 - 34.0 PG    MCHC 32.8 31.0 - 37.0 g/dL    RDW 12.6 11.6 - 14.5 %    PLATELET 733 632 - 109 K/uL    MPV 10.3 9.2 - 11.8 FL    NEUTROPHILS 85 (H) 40 - 73 % LYMPHOCYTES 11 (L) 21 - 52 %    MONOCYTES 4 3 - 10 %    EOSINOPHILS 0 0 - 5 %    BASOPHILS 0 0 - 2 %    ABS. NEUTROPHILS 14.9 (H) 1.8 - 8.0 K/UL    ABS. LYMPHOCYTES 1.9 0.9 - 3.6 K/UL    ABS. MONOCYTES 0.8 0.05 - 1.2 K/UL    ABS. EOSINOPHILS 0.0 0.0 - 0.4 K/UL    ABS. BASOPHILS 0.0 0.0 - 0.1 K/UL    DF AUTOMATED     METABOLIC PANEL, COMPREHENSIVE    Collection Time: 09/14/20  5:29 PM   Result Value Ref Range    Sodium 134 (L) 136 - 145 mmol/L    Potassium 4.4 3.5 - 5.5 mmol/L    Chloride 101 100 - 111 mmol/L    CO2 29 21 - 32 mmol/L    Anion gap 4 3.0 - 18 mmol/L    Glucose 112 (H) 74 - 99 mg/dL    BUN 30 (H) 7.0 - 18 MG/DL    Creatinine 1.43 (H) 0.6 - 1.3 MG/DL    BUN/Creatinine ratio 21 (H) 12 - 20      GFR est AA 46 (L) >60 ml/min/1.73m2    GFR est non-AA 38 (L) >60 ml/min/1.73m2    Calcium 9.1 8.5 - 10.1 MG/DL    Bilirubin, total 1.0 0.2 - 1.0 MG/DL    ALT (SGPT) 37 13 - 56 U/L    AST (SGOT) 14 10 - 38 U/L    Alk. phosphatase 75 45 - 117 U/L    Protein, total 7.1 6.4 - 8.2 g/dL    Albumin 3.6 3.4 - 5.0 g/dL    Globulin 3.5 2.0 - 4.0 g/dL    A-G Ratio 1.0 0.8 - 1.7     NT-PRO BNP    Collection Time: 09/14/20  5:29 PM   Result Value Ref Range    NT pro- 0 - 900 PG/ML   TROPONIN I    Collection Time: 09/14/20  5:29 PM   Result Value Ref Range    Troponin-I, QT <0.02 0.0 - 0.045 NG/ML   LIPASE    Collection Time: 09/14/20  5:29 PM   Result Value Ref Range    Lipase 82 73 - 393 U/L       Radiologic Studies -   XR CHEST PA LAT   Final Result   IMPRESSION:      No acute cardiopulmonary process. Thank you for your referral.        CT Results  (Last 48 hours)    None        CXR Results  (Last 48 hours)               09/14/20 1635  XR CHEST PA LAT Final result    Impression:  IMPRESSION:       No acute cardiopulmonary process. Thank you for your referral.       Narrative:  Chest PA and lateral views       CPT CODE: [id number]       HISTORY: Chest pain       COMPARISON: 9/13/20.        FINDINGS: The heart is normal in size. The lungs are clear of acute infiltration   or consolidation. . Coarse interstitial lung markers noted. The bilateral   costophrenic angles are sharp. The mediastinum, pulmonary vascularity and bony   thorax appear unremarkable. 09/13/20 2033  XR CHEST PORT Final result    Impression:  IMPRESSION: No acute findings or interval change       Narrative:  EXAM: XR CHEST PORT       INDICATION: cp       COMPARISON: Recent prior       FINDINGS: A portable AP radiograph of the chest was obtained at 2011 hours. Nasal cannula tubing noted. . The lungs are clear. Stable cardiac silhouette. Stable atherosclerosis. .  No acute bone findings. .                    Medical Decision Making   I am the first provider for this patient. I reviewed the vital signs, available nursing notes, past medical history, past surgical history, family history and social history. Vital Signs-Reviewed the patient's vital signs. EKG: Interpreted by the EP. Time Interpreted: 5:19 PM   Rate: 58 bpm   Rhythm: Normal sinus rhythm/sinus bradycardia   Interpretation: No acute changes    Records Reviewed: Nursing Notes and Old Medical Records    ED Course:   Patient felt better after treatment in the emergency department    Disposition:  Discharge home    DISCHARGE NOTE:     Pt has been reexamined. Patient has no new complaints, changes, or physical findings. Care plan outlined and precautions discussed. Results of x-ray and EKG and labs were reviewed with the patient. All medications were reviewed with the patient; will d/c home with a new prescription for prednisone, as well as refills for the inhalers that she is currently out of. All of pt's questions and concerns were addressed. Patient was instructed and agrees to follow up with primary care provider, as well as to return to the ED upon further deterioration. Patient is ready to go home.     Follow-up Information     Follow up With Specialties Details Why Contact Info    Melchor Muñoz NP Nurse Practitioner Call in 1 day  [address] [facility] EMERGENCY DEPT Emergency Medicine  As needed, If symptoms worsen [address] [place] [id number]  [phone]          Current Discharge Medication List      START taking these medications    Details   predniSONE (DELTASONE) 20 mg tablet Take 40 mg by mouth daily for 4 days. Qty: 8 Tab, Refills: 0         CONTINUE these medications which have CHANGED    Details   albuterol (PROVENTIL HFA, VENTOLIN HFA, PROAIR HFA) 90 mcg/actuation inhaler Take 2 Puffs by inhalation every four (4) hours as needed for Wheezing or Shortness of Breath for up to 5 days. Qty: 1 Inhaler, Refills: 0      albuterol-ipratropium (DUO-NEB) 2.5 mg-0.5 mg/3 ml nebu 3 mL by Nebulization route every four (4) hours as needed for Wheezing, Shortness of Breath or Cough. Qty: 30 Nebule, Refills: 0      arformoteroL (BROVANA) 15 mcg/2 mL nebu neb solution 2 mL by Nebulization route two (2) times a day. Indications: bronchospasm prevention with COPD  Qty: 60 Vial, Refills: 0      budesonide (PULMICORT) 0.5 mg/2 mL nbsp 2 mL by Nebulization route two (2) times a day. Qty: 60 Each, Refills: 0      fluticasone furoate-vilanteroL (Breo Ellipta) 200-25 mcg/dose inhaler Take 1 Puff by inhalation daily. Qty: 1 Inhaler, Refills: 0      hydrOXYzine HCL (ATARAX) 50 mg tablet Take 1 Tab by mouth every eight (8) hours as needed for Itching or Anxiety for up to 7 days. Qty: 20 Tab, Refills: 0      tiotropium (SPIRIVA) 18 mcg inhalation capsule Take 1 Cap by inhalation daily for 30 days. Qty: 30 Cap, Refills: 0         CONTINUE these medications which have NOT CHANGED    Details   citalopram (CeleXA) 20 mg tablet 1 tablet      gemfibroziL (LOPID) 600 mg tablet 1 tablet      hydroCHLOROthiazide (HYDRODIURIL) 25 mg tablet 1 tablet in the morning      isosorbide mononitrate ER (IMDUR) 30 mg tablet Take 30 mg by mouth daily. lisinopriL (PRINIVIL, ZESTRIL) 20 mg tablet 1 tablet      sucralfate (CARAFATE) 100 mg/mL suspension Take 1,000 mg by mouth. QUEtiapine (SEROquel) 50 mg tablet TAKE 1 TABLET BY MOUTH AT BEDTIME FOR MOOD      albuterol sulfate 90 mcg/actuation aebs Take 2 Puffs by inhalation every four to six (4-6) hours as needed for Wheezing. Qty: 1 Inhaler, Refills: 0      furosemide (Lasix) 20 mg tablet Take 1 Tab by mouth daily. Qty: 5 Tab, Refills: 0      cloNIDine HCL (CATAPRES) 0.2 mg tablet Take 1 Tab by mouth two (2) times a day. Qty: 30 Tab, Refills: 1      amLODIPine (NORVASC) 10 mg tablet Take 1 Tab by mouth daily. Qty: 15 Tab, Refills: 0      ALPRAZolam (Xanax) 0.5 mg tablet Take 1 Tab by mouth every eight (8) hours as needed for Anxiety. Max Daily Amount: 1.5 mg.  Qty: 20 Tab, Refills: 0    Associated Diagnoses: Anxiety reaction      aspirin delayed-release 81 mg tablet Take 1 Tab by mouth daily. Qty: 30 Tab, Refills: 0      famotidine (PEPCID) 20 mg tablet Take 1 Tab by mouth daily. Qty: 30 Tab, Refills: 1      OXYGEN-AIR DELIVERY SYSTEMS 3 L by IntraNASal route as needed for Other (sob). docusate sodium (COLACE) 100 mg capsule Take 1 Cap by mouth two (2) times a day. Qty: 120 Cap, Refills: 0      atorvastatin (LIPITOR) 20 mg tablet Take 1 Tab by mouth nightly. Qty: 30 Tab, Refills: 0      metoprolol tartrate (LOPRESSOR) 25 mg tablet Take 0.5 Tabs by mouth every twelve (12) hours. Qty: 30 Tab, Refills: 0      roflumilast (DALIRESP) 500 mcg tab tablet Take 1 Tab by mouth daily. Qty: 30 Tab, Refills: 0      naloxone (NARCAN) 4 mg/actuation nasal spray Use 1 spray intranasally, then discard. Repeat with new spray every 2 min as needed for opioid overdose symptoms, alternating nostrils. Qty: 1 Each, Refills: 0      montelukast (SINGULAIR) 10 mg tablet Take 1 Tab by mouth nightly.   Qty: 30 Tab, Refills: 1         STOP taking these medications       predniSONE (STERAPRED DS) 10 mg dose pack Comments: Reason for Stopping:         doxycycline (VIBRA-TABS) 100 mg tablet Comments:   Reason for Stopping:               Provider Notes (Medical Decision Making):   Upon her first interview, patient failed to mention that she had actually been admitted to the hospital yesterday. States that overall she is feeling better from her COPD exacerbation, and just came back today because of the new chest pain she has had for the last week. She has a normal troponin and EKG today, have a low suspicion for acute coronary syndrome. Patient was adamant that she was not going to stay for admission today, and I think it is reasonable for trial of outpatient management with oral prednisone and a refill of her medications. She also states that she has a significant anxiety component and wanted additional Xanax to take at home; I offered her a prescription for Atarax to help with her anxiety, and she will call her primary care provider tomorrow for continued close outpatient follow-up and management. Diagnosis     Clinical Impression:   1. Acute chest pain    2. Shortness of breath    3.  Acute exacerbation of chronic obstructive pulmonary disease (COPD) ([facility]

## 2020-09-14 NOTE — ACP (ADVANCE CARE PLANNING)
Advance Care Planning     Advance Care Planning Clinical Specialist  Conversation Note      Date of ACP Conversation: 09/14/20    Conversation Conducted with: Sharri Sanabria    ACP Clinical Specialist: Balaji Lockett 33: NO    Current [facility] Decision Maker: Patient's daughter is Kai Aguirre, [phone] and [phone]      Care Preferences    Hospitalization: \"If your health worsens and it becomes clear that your chance of recovery is unlikely, what would your preference be regarding hospitalization? \"    Choice:  [x]  The patient wants hospitalization  []  The patient prefers comfort-focused treatment without hospitalization. [] Yes  [x] No   Educated Patient / Brook Odonnell regarding differences between Advance Directives and portable DNR orders. Length of ACP Conversation in minutes:      Conversation Outcomes:  [x] ACP discussion completed, but patient declined to complete an ACP. [] Existing advance directive reviewed with patient; no changes to patient's previously recorded wishes   [] New Advance Directive completed   [] Portable Do Not Resuscitate prepared for Provider review and signature  [] POLST/POST/MOLST/MOST prepared for Provider review and signature      Follow-up plan:    [] Schedule follow-up conversation to continue planning  [] Referred individual to Provider for additional questions/concerns   [] Advised patient/agent/surrogate to review completed ACP document and update if needed with changes in condition, patient preferences or care setting     [x] This note routed to one or more involved healthcare providers      Lemmie M. Saintclair Roles MSW  Care Manager  Pager#: [phone]

## 2020-09-14 NOTE — ROUTINE PROCESS
Pt screaming multiple explicatives into hallway. Pt requesting to speak to manager. This nurse contacted Giuliano Courtney. Pt demanding to know who is \"messing with the light up front and hanging up on me?! This nurse attempted to calm patient and apologize. Pt does not show any evidence of learning and believes staff mistreating her. Will monitor for her safety.

## 2020-09-15 LAB
ATRIAL RATE: 58 BPM
CALCULATED P AXIS, ECG09: 73 DEGREES
CALCULATED R AXIS, ECG10: 57 DEGREES
CALCULATED T AXIS, ECG11: 68 DEGREES
DIAGNOSIS, 93000: NORMAL
P-R INTERVAL, ECG05: 130 MS
Q-T INTERVAL, ECG07: 418 MS
QRS DURATION, ECG06: 88 MS
QTC CALCULATION (BEZET), ECG08: 410 MS
VENTRICULAR RATE, ECG03: 58 BPM

## 2020-09-16 NOTE — ADT AUTH CERT NOTES
PREVIOUSLY DENIED FOR INPATIENT DOWNGRADED TO OBSERVATION REF # [id number] PLEASE FAX OR CALL BACK AUTHORIZATION FOR OBSERVATION  PHONE # [phone]  FAX # [phone]

## 2020-09-25 ENCOUNTER — HOSPITAL ENCOUNTER (EMERGENCY)
Age: 55
Discharge: HOME | End: 2020-09-25
Attending: EMERGENCY MEDICINE
Payer: MEDICAID

## 2020-09-25 ENCOUNTER — APPOINTMENT (OUTPATIENT)
Dept: GENERAL RADIOLOGY | Age: 55
End: 2020-09-25
Attending: EMERGENCY MEDICINE
Payer: MEDICAID

## 2020-09-25 VITALS
HEART RATE: 75 BPM | DIASTOLIC BLOOD PRESSURE: 88 MMHG | OXYGEN SATURATION: 95 % | WEIGHT: 170 LBS | BODY MASS INDEX: 34.32 KG/M2 | SYSTOLIC BLOOD PRESSURE: 191 MMHG | TEMPERATURE: 98 F | RESPIRATION RATE: 22 BRPM

## 2020-09-25 DIAGNOSIS — J44.1 COPD WITH ACUTE EXACERBATION (HCC): Primary | ICD-10-CM

## 2020-09-25 LAB
ALBUMIN SERPL-MCNC: 3.7 G/DL (ref 3.4–5)
ALBUMIN/GLOB SERPL: 1.2 {RATIO} (ref 0.8–1.7)
ALP SERPL-CCNC: 95 U/L (ref 45–117)
ALT SERPL-CCNC: 27 U/L (ref 13–56)
ANION GAP SERPL CALC-SCNC: 4 MMOL/L (ref 3–18)
AST SERPL-CCNC: 20 U/L (ref 10–38)
BASOPHILS # BLD: 0 K/UL (ref 0–0.1)
BASOPHILS NFR BLD: 0 % (ref 0–2)
BILIRUB SERPL-MCNC: 0.8 MG/DL (ref 0.2–1)
BUN SERPL-MCNC: 10 MG/DL (ref 7–18)
BUN/CREAT SERPL: 11 (ref 12–20)
CALCIUM SERPL-MCNC: 8.8 MG/DL (ref 8.5–10.1)
CHLORIDE SERPL-SCNC: 107 MMOL/L (ref 100–111)
CK MB CFR SERPL CALC: 3.9 % (ref 0–4)
CK MB SERPL-MCNC: 3.1 NG/ML (ref 5–25)
CK SERPL-CCNC: 79 U/L (ref 26–192)
CO2 SERPL-SCNC: 30 MMOL/L (ref 21–32)
CREAT SERPL-MCNC: 0.87 MG/DL (ref 0.6–1.3)
DIFFERENTIAL METHOD BLD: NORMAL
EOSINOPHIL # BLD: 0.1 K/UL (ref 0–0.4)
EOSINOPHIL NFR BLD: 2 % (ref 0–5)
ERYTHROCYTE [DISTWIDTH] IN BLOOD BY AUTOMATED COUNT: 13.2 % (ref 11.6–14.5)
GLOBULIN SER CALC-MCNC: 3 G/DL (ref 2–4)
GLUCOSE SERPL-MCNC: 83 MG/DL (ref 74–99)
HCT VFR BLD AUTO: 37.5 % (ref 35–45)
HGB BLD-MCNC: 12.3 G/DL (ref 12–16)
LYMPHOCYTES # BLD: 2.3 K/UL (ref 0.9–3.6)
LYMPHOCYTES NFR BLD: 36 % (ref 21–52)
MCH RBC QN AUTO: 29.3 PG (ref 24–34)
MCHC RBC AUTO-ENTMCNC: 32.8 G/DL (ref 31–37)
MCV RBC AUTO: 89.3 FL (ref 74–97)
MONOCYTES # BLD: 0.2 K/UL (ref 0.05–1.2)
MONOCYTES NFR BLD: 3 % (ref 3–10)
NEUTS SEG # BLD: 3.9 K/UL (ref 1.8–8)
NEUTS SEG NFR BLD: 59 % (ref 40–73)
PLATELET # BLD AUTO: 192 K/UL (ref 135–420)
PMV BLD AUTO: 10.2 FL (ref 9.2–11.8)
POTASSIUM SERPL-SCNC: 3.6 MMOL/L (ref 3.5–5.5)
PROT SERPL-MCNC: 6.7 G/DL (ref 6.4–8.2)
RBC # BLD AUTO: 4.2 M/UL (ref 4.2–5.3)
SODIUM SERPL-SCNC: 141 MMOL/L (ref 136–145)
TROPONIN I SERPL-MCNC: <0.02 NG/ML (ref 0–0.04)
WBC # BLD AUTO: 6.6 K/UL (ref 4.6–13.2)

## 2020-09-25 PROCEDURE — 85025 COMPLETE CBC W/AUTO DIFF WBC: CPT

## 2020-09-25 PROCEDURE — 96374 THER/PROPH/DIAG INJ IV PUSH: CPT

## 2020-09-25 PROCEDURE — 74011250636 HC RX REV CODE- 250/636: Performed by: EMERGENCY MEDICINE

## 2020-09-25 PROCEDURE — 74011000250 HC RX REV CODE- 250: Performed by: EMERGENCY MEDICINE

## 2020-09-25 PROCEDURE — 99284 EMERGENCY DEPT VISIT MOD MDM: CPT

## 2020-09-25 PROCEDURE — 94640 AIRWAY INHALATION TREATMENT: CPT

## 2020-09-25 PROCEDURE — 82550 ASSAY OF CK (CPK): CPT

## 2020-09-25 PROCEDURE — 93005 ELECTROCARDIOGRAM TRACING: CPT

## 2020-09-25 PROCEDURE — 71045 X-RAY EXAM CHEST 1 VIEW: CPT

## 2020-09-25 PROCEDURE — 80053 COMPREHEN METABOLIC PANEL: CPT

## 2020-09-25 RX ORDER — IPRATROPIUM BROMIDE AND ALBUTEROL SULFATE 2.5; .5 MG/3ML; MG/3ML
9 SOLUTION RESPIRATORY (INHALATION) ONCE
Status: COMPLETED | OUTPATIENT
Start: 2020-09-25 | End: 2020-09-25

## 2020-09-25 RX ORDER — PREDNISONE 50 MG/1
50 TABLET ORAL DAILY
Qty: 3 TAB | Refills: 0 | Status: SHIPPED | OUTPATIENT
Start: 2020-09-25 | End: 2020-09-28

## 2020-09-25 RX ADMIN — METHYLPREDNISOLONE SODIUM SUCCINATE 125 MG: 125 INJECTION, POWDER, FOR SOLUTION INTRAMUSCULAR; INTRAVENOUS at 09:18

## 2020-09-25 RX ADMIN — IPRATROPIUM BROMIDE AND ALBUTEROL SULFATE 9 ML: .5; 3 SOLUTION RESPIRATORY (INHALATION) at 09:11

## 2020-09-25 NOTE — ED NOTES
Pt stated to staff that she would like to continue treatment. This RN administered care and completed testing. Pt is resting in results waiting area. Hospital socks and warm blanket wee provided for comfort. Waiting for MD exam and test results.

## 2020-09-25 NOTE — ED NOTES
Pt arrived by ems. EMS had initiated a breathing treatment which this ER staff RN completed. Pt fell asleep in exam room chair. RN awakened the pt to proceed with EKG and blood draw. Pt woke up and became excited and agitated that she believed the ems crew did not give her her apartment portillo back. Pt had a coat, skirt and shirt on. RN stated that she should check her pockets and that RN would contact ems if key could not be found. Pt was encouraged to complete the EKG to rule out any cardiac emergency. Pt stated \"fuck you motherfucker I need my key\". Pt pointed her finger near this RN's face and was shaking her hand with a closed fist stating \"you don't know who you're talking to\". Pt was told to remove her hand from this RN's face and to calm down. The key then fell from her lap when she stood up. RN handed her the key and asked her to sit down so testing could be completed since the object in quest ion was in fact on her person the entire time. Pt continued to cuss and this RN redirected her to lower her voice and stop her threatening behavior. There was in fact another pt not far away on the other wall that was a baby. Pt continued to yell and cuss and was directed to the waiting room and security was standing in the doorway to assist. Nursing supervisor was called immediately for update and so the ER charge nurse. Pt refused to reenter the department after time to collect herself. This RN offered an apology for the misunderstanding with  at the desk in front of her. Pt refused to speak to the RN of officer. Pt would not acknowledge her wishes. The oppotunity to speak to supervisor was also extended. Pt continues to sit in the waiting area pending her decision to continue to end care.

## 2020-09-25 NOTE — DISCHARGE INSTRUCTIONS
Patient Education        Chronic Obstructive Pulmonary Disease (COPD): Care Instructions  Your Care Instructions     Chronic obstructive pulmonary disease (COPD) is a general term for a group of lung diseases, including emphysema and chronic bronchitis. People with COPD have decreased airflow in and out of the lungs, which makes it hard to breathe. The airways also can get clogged with thick mucus. Cigarette smoking is a major cause of COPD. Although there is no cure for COPD, you can slow its progress. Following your treatment plan and taking care of yourself can help you feel better and live longer. Follow-up care is a key part of your treatment and safety. Be sure to make and go to all appointments, and call your doctor if you are having problems. It's also a good idea to know your test results and keep a list of the medicines you take. How can you care for yourself at home? Staying healthy    · Do not smoke. This is the most important step you can take to prevent more damage to your lungs. If you need help quitting, talk to your doctor about stop-smoking programs and medicines. These can increase your chances of quitting for good.     · Avoid colds and flu. Get a pneumococcal vaccine shot. If you have had one before, ask your doctor whether you need a second dose. Get the flu vaccine every fall. If you must be around people with colds or the flu, wash your hands often.     · Avoid secondhand smoke, air pollution, and high altitudes. Also avoid cold, dry air and hot, humid air. Stay at home with your windows closed when air pollution is bad. Medicines and oxygen therapy    · Take your medicines exactly as prescribed. Call your doctor if you think you are having a problem with your medicine. You may be taking medicines such as:  ? Bronchodilators. These help open your airways and make breathing easier. They are either short-acting (work for 6 to 9 hours) or long-acting (work for 24 hours).  You inhale most bronchodilators, so they start to act quickly. Always carry your quick-relief inhaler with you in case you need it while you are away from home. ? Corticosteroids (prednisone, budesonide). These reduce airway inflammation. They come in pill or inhaled form. You must take these medicines every day for them to work well.     · Ask your doctor or pharmacist if a spacer is right for you. A spacer may help you get more inhaled medicine to your lungs. If you use one, ask how to use it properly.     · Do not take any vitamins, over-the-counter medicine, or herbal products without talking to your doctor first.     · If your doctor prescribed antibiotics, take them as directed. Do not stop taking them just because you feel better. You need to take the full course of antibiotics.     · If you use oxygen therapy, use the flow rate your doctor has recommended. Don't change it without talking to your doctor first. Oxygen therapy boosts the amount of oxygen in your blood and helps you breathe easier. Activity    · Get regular exercise. Walking is an easy way to get exercise. Start out slowly, and walk a little more each day.     · Pay attention to your breathing. You are exercising too hard if you can't talk while you exercise.     · Take short rest breaks when doing household chores and other activities.     · Learn breathing methods--such as breathing through pursed lips--to help you become less short of breath.     · If your doctor has not set you up with a pulmonary rehabilitation program, ask if rehab is right for you. Rehab includes exercise programs, education about your disease and how to manage it, help with diet and other changes, and emotional support. Diet    · Eat regular, healthy meals. Use bronchodilators about 1 hour before you eat to make it easier to eat. Eat several small meals instead of three large ones.  Drink beverages at the end of the meal. Avoid foods that are hard to chew.     · Eat foods that contain protein so you don't lose muscle mass.     · Talk with your doctor if you gain too much weight or if you lose weight without trying. Mental health    · Talk to your family, friends, or a therapist about your feelings. Some people feel frightened, angry, hopeless, helpless, and even guilty. Talking openly about bad feelings can help you cope. If these feelings last, talk to your doctor. When should you call for help? Call 911 anytime you think you may need emergency care. For example, call if:    · You have severe trouble breathing. Call your doctor now or seek immediate medical care if:    · You have new or worse trouble breathing.     · You cough up blood.     · You have a fever. Watch closely for changes in your health, and be sure to contact your doctor if:    · You cough more deeply or more often, especially if you notice more mucus or a change in the color of your mucus.     · You have new or worse swelling in your legs or belly.     · You are not getting better as expected. Where can you learn more? Go to http://www.gray.com/  Enter P399 in the search box to learn more about \"Chronic Obstructive Pulmonary Disease (COPD): Care Instructions. \"  Current as of: February 24, 2020               Content Version: 12.6  © 2006-2020 [employer]. Care instructions adapted under license by [employer] (which disclaims liability or warranty for this information). If you have questions about a medical condition or this instruction, always ask your healthcare professional. [place] [address] any warranty or liability for your use of this information.

## 2020-09-25 NOTE — ED PROVIDER NOTES
EMERGENCY DEPARTMENT HISTORY AND PHYSICAL EXAM      Date: 9/25/2020  Patient Name: Gabe Clayton    History of Presenting Illness     Chief Complaint   Patient presents with    Shortness of Breath    Chest Pain       History (Context): Gabe Clayton is a 63 y.o. woman who is well-known to this department and myself personally, who has COPD, heart failure, crack cocaine use disorder, and a complicated set of active comorbid conditions as noted below in the past medical history, who presents with acute onset, severe, constant dyspnea. This started many hours ago ago. Dyspnea is associated with wheezing. Dyspnea is exacerbated by exertion. On review of systems, the patient denies chest pain, back pain, leg swelling, leg pain, recent travel, fever, chills, trauma, back pain, abdominal pain, nausea, vomiting, diaphoresis. PCP: Tori Salgado NP    Current Outpatient Medications   Medication Sig Dispense Refill    cloNIDine HCL (CATAPRES) 0.2 mg tablet Take 1 Tab by mouth two (2) times a day for 30 days. 30 Tab 1    albuterol sulfate 90 mcg/actuation aebs Take 2 Puffs by inhalation every four to six (4-6) hours as needed for Wheezing for up to 30 days. 1 Inhaler 0    albuterol-ipratropium (DUO-NEB) 2.5 mg-0.5 mg/3 ml nebu 3 mL by Nebulization route every four (4) hours as needed for Wheezing, Shortness of Breath or Cough. 30 Nebule 0    arformoteroL (BROVANA) 15 mcg/2 mL nebu neb solution 2 mL by Nebulization route two (2) times a day. Indications: bronchospasm prevention with COPD 60 Vial 0    budesonide (PULMICORT) 0.5 mg/2 mL nbsp 2 mL by Nebulization route two (2) times a day. 60 Each 0    fluticasone furoate-vilanteroL (Breo Ellipta) 200-25 mcg/dose inhaler Take 1 Puff by inhalation daily. 1 Inhaler 0    tiotropium (SPIRIVA) 18 mcg inhalation capsule Take 1 Cap by inhalation daily for 30 days.  30 Cap 0    citalopram (CeleXA) 20 mg tablet 1 tablet      gemfibroziL (LOPID) 600 mg tablet 1 tablet      hydroCHLOROthiazide (HYDRODIURIL) 25 mg tablet 1 tablet in the morning      isosorbide mononitrate ER (IMDUR) 30 mg tablet Take 30 mg by mouth daily.  lisinopriL (PRINIVIL, ZESTRIL) 20 mg tablet 1 tablet      sucralfate (CARAFATE) 100 mg/mL suspension Take 1,000 mg by mouth.  QUEtiapine (SEROquel) 50 mg tablet TAKE 1 TABLET BY MOUTH AT BEDTIME FOR MOOD      furosemide (Lasix) 20 mg tablet Take 1 Tab by mouth daily. 5 Tab 0    amLODIPine (NORVASC) 10 mg tablet Take 1 Tab by mouth daily. 15 Tab 0    ALPRAZolam (Xanax) 0.5 mg tablet Take 1 Tab by mouth every eight (8) hours as needed for Anxiety. Max Daily Amount: 1.5 mg. 20 Tab 0    aspirin delayed-release 81 mg tablet Take 1 Tab by mouth daily. 30 Tab 0    famotidine (PEPCID) 20 mg tablet Take 1 Tab by mouth daily. 30 Tab 1    OXYGEN-AIR DELIVERY SYSTEMS 3 L by IntraNASal route as needed for Other (sob).  docusate sodium (COLACE) 100 mg capsule Take 1 Cap by mouth two (2) times a day. 120 Cap 0    atorvastatin (LIPITOR) 20 mg tablet Take 1 Tab by mouth nightly. 30 Tab 0    metoprolol tartrate (LOPRESSOR) 25 mg tablet Take 0.5 Tabs by mouth every twelve (12) hours. 30 Tab 0    roflumilast (DALIRESP) 500 mcg tab tablet Take 1 Tab by mouth daily. 30 Tab 0    naloxone (NARCAN) 4 mg/actuation nasal spray Use 1 spray intranasally, then discard. Repeat with new spray every 2 min as needed for opioid overdose symptoms, alternating nostrils. 1 Each 0    montelukast (SINGULAIR) 10 mg tablet Take 1 Tab by mouth nightly.  30 Tab 1       Past History     Past Medical History:  Past Medical History:   Diagnosis Date    CHF (congestive heart failure) (HCC)     Chronic respiratory failure with hypoxia ([facility] 9/7/2020    CKD (chronic kidney disease) stage 3, GFR 30-59 ml/min 10/31/2019    Cocaine abuse ([facility] 11/26/2017    COPD (chronic obstructive pulmonary disease) with chronic bronchitis ([facility] 12/19/2017    Dependence on supplemental oxygen     Depression 3/22/2020    Drug-seeking behavior 11/19/2016    Endocrine disease     thyroid issues    Essential hypertension 3/10/2017    Fe deficiency anemia 10/27/2012    Fibromyalgia 12/16/2016    Gastroesophageal reflux disease without esophagitis 10/10/2016    Gastrointestinal disorder     \"blockage in my stomach\"    Hypercholesterolemia     Hypertension     Hypertensive heart failure ([address] 12/19/2017    Morbid obesity due to excess calories ([address] 3/10/2017    NSTEMI (non-ST elevated myocardial infarction) ([address] 3/22/2020    On home O2 12/3/2015    Overview:  2L at home per pt for approx 8 years    Polysubstance abuse ([address] 9/15/2018    Respiratory failure ([address] 1/11/2017    S/P hernia repair 10/31/2019    Sleep apnea 12/19/2017    T wave inversion in EKG 3/9/2019    Tobacco use 3/18/4059    Uncomplicated severe persistent asthma 12/13/2016    Vocal cord disease 12/7/2016       Past Surgical History:  Past Surgical History:   Procedure Laterality Date    HX GI      Exploratory laparotomy with lysis of adhesions and primary repair of incarcerated umbilical hernia       Family History:  History reviewed. No pertinent family history. Social History:  Social History     Tobacco Use    Smoking status: Current Every Day Smoker     Packs/day: 0.25    Smokeless tobacco: Current User   Substance Use Topics    Alcohol use: No     Comment: socially    Drug use: Not Currently     Types: Heroin       Allergies:  No Known Allergies    PMH, PSH, family history, social history, allergies reviewed with the patient with significant items noted above. Review of Systems   As per HPI, otherwise reviewed and negative.      Physical Exam     Vitals:    09/25/20 0317   BP: (!) 191/88   Pulse: 75   Resp: 22   Temp: 98 °F (36.7 °C)   SpO2: 95%   Weight: 77.1 kg (170 lb)       Gen: In moderate respiratory distress  HEENT: Normocephalic, sclera anicteric  Cardiovascular: Normal rate, regular rhythm, no murmurs, rubs, gallops. Pulses intact and equal distally. Pulmonary: Moderate respiratory distress. Tachypneic. No stridor. Crackles and wheezing throughout, audible without auscultation  ABD: Soft, nontender, nondistended. Neuro: Alert. Normal speech. Normal mentation. Psych: Normal thought content and thought processes. : No CVA tenderness  EXT: Mild bilateral peripheral edema. Moves all extremities well. No cyanosis or clubbing. Skin: Warm and well-perfused. Diagnostic Study Results     Labs -     No results found for this or any previous visit (from the past 12 hour(s)). Radiologic Studies -   XR CHEST SNGL V   Final Result   IMPRESSION:      Negative chest.           CT Results  (Last 48 hours)    None        CXR Results  (Last 48 hours)    None            Medical Decision Making   I am the first provider for this patient. I reviewed the vital signs, available nursing notes, past medical history, past surgical history, family history and social history. Vital Signs-Reviewed the patient's vital signs. EKG: Interpreted by myself. Records Reviewed: Personally, on initial evaluation    MDM:   Patient presents with dyspnea, which is associated with wheezing, respiratory distress, exertion. Exam significant for respiratory distress, crackles and wheezing. This exam is typical for the patient when she is in respiratory distress. .   DDX considered: Pneumothorax, pneumonia, COPD exacerbation, CHF, ACS, anxiety, crack lung, COVID-19  DDX thought to be less likely but also considered due to high risk condition: Anxiety, aortic dissection, PE, Boerhaave's, pericarditis, mediastinitis    Patient condition on initial evaluation: Moderately to severely ill    Plan:   Oxygen therapy per protocol  Close Observation  Cardiac monitoring  As per orders noted below:  Orders Placed This Encounter    XR CHEST SNGL V    CBC WITH AUTOMATED DIFF    METABOLIC PANEL, COMPREHENSIVE    CARDIAC PANEL,(CK, CKMB & TROPONIN)    EKG, 12 LEAD, INITIAL    albuterol-ipratropium (DUO-NEB) 2.5 MG-0.5 MG/3 ML    methylPREDNISolone (PF) (Solu-MEDROL) injection 125 mg    DISCONTD: predniSONE (DELTASONE) 50 mg tablet          ED Course:   Work-up as above typical for patient. Patient improved throughout ED course with treatment, and she requested discharge home. Patient given strict return precautions. Patient condition at time of disposition: Stable, but chronically ill  DISCHARGE NOTE:   Pt has been reexamined. Patient has no new complaints, changes, or physical findings. Care plan outlined and precautions discussed. Results were reviewed with the patient. All medications were reviewed with the patient; will d/c home with no changes to meds. All of pt's questions and concerns were addressed. Alarm symptoms and return precautions associated with chief complaint and evaluation were reviewed with the patient in detail. The patient demonstrated adequate understanding. Patient was instructed and agrees to follow up with PCP, as well as to return to the ED upon further deterioration. Patient is ready to go home. The patient is happy with this plan    Follow-up Information     Follow up With Specialties Details Why [facility]    [address] [facility] EMERGENCY DEPT Emergency Medicine  As needed, If symptoms worsen [address] [facility]    Shasha Haider NP Nurse Practitioner  As needed, If symptoms worsen [address] [facility]  [phone]            Discharge Medication List as of 9/25/2020  8:23 AM          Procedures:  Procedures      Critical Care Time:       Diagnosis     Clinical Impression:   1. COPD with acute exacerbation ([facility]        Signed,  Megan Mendez MD  Emergency Physician  [facility]    As a voice dictation software was utilized to dictate this note, minor word transpositions can occur.   I apologize for confusing wording and typographic errors. Please feel free to contact me for clarification.

## 2020-09-25 NOTE — ED TRIAGE NOTES
Pt arrived from home for sudden onset of chest pain and SOB three hours PTA. Pt has diffuse wheezing and generalized chest pain rated 10/10. Pt is hypertensive but otherwise vitals stable. Pt was given one Duo Neb treatment by EMS.

## 2020-09-26 LAB
ATRIAL RATE: 69 BPM
CALCULATED P AXIS, ECG09: 65 DEGREES
CALCULATED R AXIS, ECG10: -23 DEGREES
CALCULATED T AXIS, ECG11: 67 DEGREES
DIAGNOSIS, 93000: NORMAL
P-R INTERVAL, ECG05: 144 MS
Q-T INTERVAL, ECG07: 446 MS
QRS DURATION, ECG06: 126 MS
QTC CALCULATION (BEZET), ECG08: 477 MS
VENTRICULAR RATE, ECG03: 69 BPM

## 2020-09-27 NOTE — DISCHARGE SUMMARY
[address] [place] [address] Dr Herminio Nicole [facility], [address]     [facility] SUMMARY    Name: Laquita Escobedo MRN: [id number]   Age / Sex: 63 y.o. / female CSN: [id number]   YOB: 1965 Length of Stay: 2 days   Admit Date: 9/7/2020 Discharge Date:        PRIMARY CARE PHYSICIAN: Jerry Camacho NP      DISCHARGE DIAGNOSES:    Acute respiratory distress due to #2  2.  COPD/asthma exacerbation  3.  Noncardiogenic pulmonary edema could be due to #4  4.  Positive urine drug screen for cocaine, methadone and opiates  5.  Medical noncompliance  6.  Acute bronchitis, needs to rule out COVID  7.  Chronic diastolic CHF, compensated  8.  Atypical chest pain, no ACS most likely chest discomfort from COPD/asthma exacerbation  9.  Moderate intermittent chronic asthma     CONSULTS CALLED: Pulmonary      PROCEDURES DONE: None      COURSE IN THE HOSPITAL: This is a 62-year-old female with known history of COPD/asthma and a history of polysubstance abuse who presented to the ED with worsening shortness of breath. Patient was evaluated and was admitted. It was felt that patient had a COPD/asthma exacerbation. Patient was started on steroids and bronchodilators. Patient started to feel a little better. PT OT were ordered. Patient was counseled regarding compliance. Patient suddenly decided to leave [address] Patient left before I could come and talk to her. DISCHARGE VITAL SIGNS:  Visit Vitals  /86 (BP 1 Location: Right arm, BP Patient Position: At rest)   Pulse 70   Temp 97.6 °F (36.4 °C)   Resp 18   Ht 4' 11.02\" (1.499 m)   Wt 71.2 kg (157 lb)   SpO2 100%   BMI 31.69 kg/m²         DISPOSITION: Patient left AGAINST MEDICAL ADVICE      OTHER INSTRUCTIONS:      Dragon medical dictation software was used for portions of this report. Unintended errors may occur.       Signed:  Paula Bell MD      9/27/2020

## 2020-09-28 ENCOUNTER — APPOINTMENT (OUTPATIENT)
Dept: GENERAL RADIOLOGY | Age: 55
End: 2020-09-28
Attending: EMERGENCY MEDICINE
Payer: MEDICAID

## 2020-09-28 ENCOUNTER — HOSPITAL ENCOUNTER (EMERGENCY)
Age: 55
Discharge: HOME | End: 2020-09-28
Attending: EMERGENCY MEDICINE
Payer: MEDICAID

## 2020-09-28 VITALS
DIASTOLIC BLOOD PRESSURE: 92 MMHG | TEMPERATURE: 98.3 F | SYSTOLIC BLOOD PRESSURE: 169 MMHG | HEART RATE: 89 BPM | RESPIRATION RATE: 16 BRPM | OXYGEN SATURATION: 100 %

## 2020-09-28 DIAGNOSIS — R07.89 ATYPICAL CHEST PAIN: Primary | ICD-10-CM

## 2020-09-28 LAB
ANION GAP SERPL CALC-SCNC: 3 MMOL/L (ref 3–18)
BASOPHILS # BLD: 0 K/UL (ref 0–0.1)
BASOPHILS NFR BLD: 0 % (ref 0–2)
BUN SERPL-MCNC: 13 MG/DL (ref 7–18)
BUN/CREAT SERPL: 9 (ref 12–20)
CALCIUM SERPL-MCNC: 9.2 MG/DL (ref 8.5–10.1)
CHLORIDE SERPL-SCNC: 108 MMOL/L (ref 100–111)
CK MB CFR SERPL CALC: NORMAL % (ref 0–4)
CK MB SERPL-MCNC: <1 NG/ML (ref 5–25)
CK SERPL-CCNC: 41 U/L (ref 26–192)
CO2 SERPL-SCNC: 32 MMOL/L (ref 21–32)
CREAT SERPL-MCNC: 1.44 MG/DL (ref 0.6–1.3)
DIFFERENTIAL METHOD BLD: NORMAL
EOSINOPHIL # BLD: 0.1 K/UL (ref 0–0.4)
EOSINOPHIL NFR BLD: 1 % (ref 0–5)
ERYTHROCYTE [DISTWIDTH] IN BLOOD BY AUTOMATED COUNT: 13.3 % (ref 11.6–14.5)
GLUCOSE SERPL-MCNC: 113 MG/DL (ref 74–99)
HCT VFR BLD AUTO: 43.9 % (ref 35–45)
HGB BLD-MCNC: 14.4 G/DL (ref 12–16)
LYMPHOCYTES # BLD: 2.3 K/UL (ref 0.9–3.6)
LYMPHOCYTES NFR BLD: 24 % (ref 21–52)
MCH RBC QN AUTO: 29.4 PG (ref 24–34)
MCHC RBC AUTO-ENTMCNC: 32.8 G/DL (ref 31–37)
MCV RBC AUTO: 89.8 FL (ref 74–97)
MONOCYTES # BLD: 0.4 K/UL (ref 0.05–1.2)
MONOCYTES NFR BLD: 5 % (ref 3–10)
NEUTS SEG # BLD: 6.5 K/UL (ref 1.8–8)
NEUTS SEG NFR BLD: 70 % (ref 40–73)
PLATELET # BLD AUTO: 239 K/UL (ref 135–420)
PMV BLD AUTO: 10.8 FL (ref 9.2–11.8)
POTASSIUM SERPL-SCNC: 3.8 MMOL/L (ref 3.5–5.5)
RBC # BLD AUTO: 4.89 M/UL (ref 4.2–5.3)
SODIUM SERPL-SCNC: 143 MMOL/L (ref 136–145)
TROPONIN I SERPL-MCNC: <0.02 NG/ML (ref 0–0.04)
TROPONIN I SERPL-MCNC: <0.02 NG/ML (ref 0–0.04)
WBC # BLD AUTO: 9.3 K/UL (ref 4.6–13.2)

## 2020-09-28 PROCEDURE — 85025 COMPLETE CBC W/AUTO DIFF WBC: CPT

## 2020-09-28 PROCEDURE — 80048 BASIC METABOLIC PNL TOTAL CA: CPT

## 2020-09-28 PROCEDURE — 74011250636 HC RX REV CODE- 250/636: Performed by: EMERGENCY MEDICINE

## 2020-09-28 PROCEDURE — 96374 THER/PROPH/DIAG INJ IV PUSH: CPT

## 2020-09-28 PROCEDURE — 71045 X-RAY EXAM CHEST 1 VIEW: CPT

## 2020-09-28 PROCEDURE — 99284 EMERGENCY DEPT VISIT MOD MDM: CPT

## 2020-09-28 PROCEDURE — 99283 EMERGENCY DEPT VISIT LOW MDM: CPT

## 2020-09-28 PROCEDURE — 93005 ELECTROCARDIOGRAM TRACING: CPT

## 2020-09-28 PROCEDURE — 82550 ASSAY OF CK (CPK): CPT

## 2020-09-28 RX ORDER — CLONIDINE HYDROCHLORIDE 0.2 MG/1
0.2 TABLET ORAL 2 TIMES DAILY
Qty: 30 TAB | Refills: 1 | Status: SHIPPED | OUTPATIENT
Start: 2020-09-28 | End: 2020-10-10

## 2020-09-28 RX ORDER — MORPHINE SULFATE 4 MG/ML
4 INJECTION, SOLUTION INTRAMUSCULAR; INTRAVENOUS
Status: COMPLETED | OUTPATIENT
Start: 2020-09-28 | End: 2020-09-28

## 2020-09-28 RX ORDER — PREDNISONE 50 MG/1
50 TABLET ORAL DAILY
Qty: 3 TAB | Refills: 0 | Status: SHIPPED | OUTPATIENT
Start: 2020-09-28 | End: 2020-10-01

## 2020-09-28 RX ADMIN — MORPHINE SULFATE 4 MG: 4 INJECTION, SOLUTION INTRAMUSCULAR; INTRAVENOUS at 15:22

## 2020-09-28 NOTE — ED TRIAGE NOTES
PT arrived via ems from home with complaints of chest pressure under left breast. Pt took own nitro and a full dose of aspirin

## 2020-09-28 NOTE — ED PROVIDER NOTES
The history is provided by the patient. No  was used. Chest Pain    This is a chronic problem. The current episode started 1 to 2 hours ago. The problem has not changed since onset. Duration of episode(s) is 1 hour. The pain is associated with rest. The pain is present in the left side. The pain is moderate. The quality of the pain is described as sharp and stabbing. The pain does not radiate. Exacerbated by: nothing. Pertinent negatives include no abdominal pain, no back pain, no claudication, no cough, no diaphoresis, no dizziness, no exertional chest pressure, no fever, no headaches, no hemoptysis, no irregular heartbeat, no leg pain, no lower extremity edema, no malaise/fatigue, no nausea, no near-syncope, no numbness, no orthopnea, no palpitations, no PND, no shortness of breath, no sputum production, no vomiting and no weakness. She has tried nothing for the symptoms. The treatment provided no relief. Risk factors include smoking/tobacco exposure, family history, cardiac disease, hypertension, a sendentary lifestyle and dyslipidemia. Her past medical history is significant for HTN and CHF. Her past medical history does not include aneurysm, cancer, DM, DVT or PE. Procedural history includes stress echo. Pertinent negatives include no stress thallium, no Walling stents and no CABG.        Past Medical History:   Diagnosis Date    CHF (congestive heart failure) (HCC)     Chronic respiratory failure with hypoxia ([address] 9/7/2020    CKD (chronic kidney disease) stage 3, GFR 30-59 ml/min ([address] 10/31/2019    Cocaine abuse ([address] 11/26/2017    COPD (chronic obstructive pulmonary disease) with chronic bronchitis ([address] 12/19/2017    Dependence on supplemental oxygen     Depression 3/22/2020    Drug-seeking behavior 11/19/2016    Endocrine disease     thyroid issues    Essential hypertension 3/10/2017    Fe deficiency anemia 10/27/2012    Fibromyalgia 12/16/2016    Gastroesophageal reflux disease without esophagitis 10/10/2016    Gastrointestinal disorder     \"blockage in my stomach\"    Hypercholesterolemia     Hypertension     Hypertensive heart failure ([facility] 12/19/2017    Morbid obesity due to excess calories ([facility] 3/10/2017    NSTEMI (non-ST elevated myocardial infarction) ([facility] 3/22/2020    On home O2 12/3/2015    Overview:  2L at home per pt for approx 8 years    Polysubstance abuse ([facility] 9/15/2018    Respiratory failure ([facility] 1/11/2017    S/P hernia repair 10/31/2019    Sleep apnea 12/19/2017    T wave inversion in EKG 3/9/2019    Tobacco use 4/10/1394    Uncomplicated severe persistent asthma 12/13/2016    Vocal cord disease 12/7/2016       Past Surgical History:   Procedure Laterality Date    HX GI      Exploratory laparotomy with lysis of adhesions and primary repair of incarcerated umbilical hernia         No family history on file.     Social History     Socioeconomic History    Marital status: SINGLE     Spouse name: Not on file    Number of children: Not on file    Years of education: Not on file    Highest education level: Not on file   Occupational History    Not on file   Social Needs    Financial resource strain: Not on file    Food insecurity     Worry: Not on file     Inability: Not on file    Transportation needs     Medical: Not on file     Non-medical: Not on file   Tobacco Use    Smoking status: Current Every Day Smoker     Packs/day: 0.25    Smokeless tobacco: Current User   Substance and Sexual Activity    Alcohol use: No     Comment: socially    Drug use: Not Currently     Types: Heroin    Sexual activity: Not Currently     Comment: last used 9 years ago   Lifestyle    Physical activity     Days per week: Not on file     Minutes per session: Not on file    Stress: Not on file   Relationships    Social connections     Talks on phone: Not on file     Gets together: Not on file     Attends Rastafarian service: Not on file     Active member of club or organization: Not on file     Attends meetings of clubs or organizations: Not on file     Relationship status: Not on file    Intimate partner violence     Fear of current or ex partner: Not on file     Emotionally abused: Not on file     Physically abused: Not on file     Forced sexual activity: Not on file   Other Topics Concern    Not on file   Social History Narrative    Not on file         ALLERGIES: Patient has no known allergies. Review of Systems   Constitutional: Negative for diaphoresis, fever and malaise/fatigue. Respiratory: Negative for cough, hemoptysis, sputum production and shortness of breath. Cardiovascular: Positive for chest pain. Negative for palpitations, orthopnea, claudication, PND and near-syncope. Gastrointestinal: Negative for abdominal pain, nausea and vomiting. Musculoskeletal: Negative for back pain. Neurological: Negative for dizziness, weakness, numbness and headaches.        Vitals:    09/28/20 1410 09/28/20 1410 09/28/20 1415   BP: (!) 169/92     Pulse: 89     Resp: 16     Temp:   98.3 °F (36.8 °C)   SpO2:  98%             Physical Exam     MDM       Procedures

## 2020-09-28 NOTE — DISCHARGE INSTRUCTIONS

## 2020-09-28 NOTE — DISCHARGE SUMMARY
Discharge Summary    Patient: Pat Abrams MRN: [id number]  CSN: [id number]    YOB: 1965  Age: 63 y.o.   Sex: female    DOA: 9/13/2020 LOS:  LOS: 0 days   Discharge Date: 9/14/2020     Admission Diagnoses: Acute exacerbation of chronic obstructive pulmonary disease (COPD) ([facility] [J44.1]  Acute respiratory distress [R06.03]  COPD exacerbation ([place] [address] [J44.1]    Discharge Diagnoses:    Problem List as of 9/14/2020 Date Reviewed: 10/23/2019          Codes Class Noted - Resolved    Acute exacerbation of chronic obstructive pulmonary disease (COPD) ([place] [address] ICD-10-CM: J44.1  ICD-9-CM: 491.21  9/14/2020 - Present        COPD with acute exacerbation ([place] [address].) ICD-10-CM: J44.1  ICD-9-CM: 491.21  9/7/2020 - Present        Chronic respiratory failure with hypoxia ([place] [address].) ICD-10-CM: J96.11  ICD-9-CM: 518.83, 799.02  9/7/2020 - Present        Acute bronchitis ICD-10-CM: J20.9  ICD-9-CM: 466.0  9/7/2020 - Present        Suspected COVID-19 virus infection ICD-10-CM: Z20.828  ICD-9-CM: V01.79  9/7/2020 - Present        Tachycardia ICD-10-CM: R00.0  ICD-9-CM: 785.0  9/7/2020 - Present        Normocytic anemia ICD-10-CM: D64.9  ICD-9-CM: 285.9  9/7/2020 - Present        Acute respiratory distress ICD-10-CM: R06.03  ICD-9-CM: 518.82  9/7/2020 - Present        COPD exacerbation ([place] [address] ICD-10-CM: J44.1  ICD-9-CM: 491.21  9/7/2020 - Present        Depression ICD-10-CM: F32.9  ICD-9-CM: 771  3/22/2020 - Present        S/P hernia repair ICD-10-CM: L41.796, Z87.19  ICD-9-CM: V45.89  10/31/2019 - Present        CKD (chronic kidney disease) stage 3, GFR 30-59 ml/min (HCC) ICD-10-CM: N18.3  ICD-9-CM: 585.3  10/31/2019 - Present        Tobacco use ICD-10-CM: Z72.0  ICD-9-CM: 305.1  9/28/2019 - Present        T wave inversion in EKG ICD-10-CM: R94.31  ICD-9-CM: 794.31  3/9/2019 - Present        HLD (hyperlipidemia) ICD-10-CM: E78.5  ICD-9-CM: 272.4  9/15/2018 - Present        Polysubstance abuse ([address] ICD-10-CM: F19.10  ICD-9-CM: 305.90 9/15/2018 - Present        Hypertensive heart failure ([facility] ICD-10-CM: I11.0  ICD-9-CM: 402.91, 428.9  12/19/2017 - Present        Sleep apnea ICD-10-CM: G47.30  ICD-9-CM: 780.57  12/19/2017 - Present        COPD (chronic obstructive pulmonary disease) with chronic bronchitis (HCC) ICD-10-CM: J44.9  ICD-9-CM: 491.20  12/19/2017 - Present        Cocaine abuse ([place] [address] ICD-10-CM: F14.10  ICD-9-CM: 305.60  11/26/2017 - Present        Essential hypertension ICD-10-CM: I10  ICD-9-CM: 401.9  3/10/2017 - Present        Morbid obesity due to excess calories ([place] [address] ICD-10-CM: E66.01  ICD-9-CM: 278.01  3/10/2017 - Present        Acute exacerbation of COPD with asthma ([place] [address] ICD-10-CM: J44.1, J45.901  ICD-9-CM: 493.22  3/2/2017 - Present        Respiratory failure ([place] [address] ICD-10-CM: J96.90  ICD-9-CM: 518.81  1/11/2017 - Present        Fibromyalgia ICD-10-CM: M79.7  ICD-9-CM: 729.1  12/16/2016 - Present        Uncomplicated severe persistent asthma ICD-10-CM: J45.50  ICD-9-CM: 493.90  12/13/2016 - Present        Vocal cord disease ICD-10-CM: J38.3  ICD-9-CM: 478.5  12/7/2016 - Present        Drug-seeking behavior ICD-10-CM: Z76.5  ICD-9-CM: 305.90  11/19/2016 - Present        Gastroesophageal reflux disease without esophagitis ICD-10-CM: K21.9  ICD-9-CM: 530.81  10/10/2016 - Present        Thyroid goiter ICD-10-CM: E04.9  ICD-9-CM: 240.9  6/30/2016 - Present        Asthma ICD-10-CM: J45.909  ICD-9-CM: 493.90  3/5/2016 - Present        On home O2 ICD-10-CM: Z99.81  ICD-9-CM: V46.2  12/3/2015 - Present    Overview Signed 3/22/2020 10:33 PM by DARCIE Juárez     Overview:   2L at home per pt for approx 8 years             Abnormal CT of the chest ICD-10-CM: R93.89  ICD-9-CM: 793.2  11/20/2015 - Present        MDRO (multiple drug resistant organisms) resistance ICD-10-CM: Z16.35  ICD-9-CM: V09.91  11/8/2012 - Present        Fe deficiency anemia ICD-10-CM: D50.9  ICD-9-CM: 280.9  10/27/2012 - Present        RESOLVED: CAP (community acquired pneumonia) ICD-10-CM: J18.9  ICD-9-CM: 077  9/28/2019 - 10/13/2019        RESOLVED: Chest pain on breathing ICD-10-CM: R07.1  ICD-9-CM: 786.52  3/10/2017 - 10/13/2019        RESOLVED: COPD with exacerbation ([facility] ICD-10-CM: J44.1  ICD-9-CM: 491.21  7/11/2016 - 9/28/2019        RESOLVED: COPD exacerbation ([facility] ICD-10-CM: J44.1  ICD-9-CM: 491.21  3/12/2016 - 9/28/2019        RESOLVED: Shortness of breath ICD-10-CM: R06.02  ICD-9-CM: 786.05  3/5/2016 - 9/28/2019        RESOLVED: COPD exacerbation ([facility] ICD-10-CM: J44.1  ICD-9-CM: 491.21  2/2/2016 - 2/8/2016        RESOLVED: COPD with exacerbation ([facility] ICD-10-CM: J44.1  ICD-9-CM: 491.21  2/2/2016 - 2/8/2016        RESOLVED: COPD with acute exacerbation ([facility] ICD-10-CM: J44.1  ICD-9-CM: 491.21  11/17/2015 - 11/20/2015        RESOLVED: Asthma exacerbation ICD-10-CM: J45.901  ICD-9-CM: 493.92  11/17/2015 - 11/20/2015              Reason for Admission  63 y.o. female who has a past medical history of COPD/asthma, hypertension, hyperlipidemia, cocaine use, chronic opiate use currently on methadone, noncompliance with medications presents to the emergency room secondary to shortness of breath. Patient was seen as recently as last week in the ER where she signed out [address] Patient mentions that her shortness of breath has not gotten better and she came back to the ER. This time she has decided to stay. ER evaluationpatient noted to be actively wheezing despite multiple nebulizer treatment, she appeared to be visibly short of breath and is currently on 3 L nasal cannula supplemental oxygen. Chest x-ray reviewedappears to be within normal limits. Patient has been started on IV steroids, IV antibiotics, home medications resumed, will be admitted to the telemetry unit for further evaluation and treatment.       Discharge Condition: unstable for discharge, she left AMA    PHYSICAL EXAM   Visit Vitals  /74   Pulse 77   Temp 98.6 °F (37 °C) Resp 18   Wt 77.5 kg (170 lb 14.4 oz)   SpO2 97%   Breastfeeding No   BMI 34.50 kg/m²     Awake alert and oriented x4. Obese, ambulating independently and w/o difficulty  HEENT: Sclerae nonicteric. Oral mucous membranes moist. Dentition poor. Neck: Supple with midline trachea. CV: RRR without murmur or rub appreciated. Resp: Bilateral expiratory wheezing  Abd: Soft, nontender, nondistended. Extrem: Extremities are warm, without cyanosis or clubbing. No pitting pretibial edema. Skin: Warm, no visible rashes. Neuro: Patient is alert, oriented, and cooperative with exam. No obvious focal defects. Ambulating independently and w/o difficulty. Hospital Course:   1. COPD exacerbation IV steroids, IV antibiotics, bronchodilators given  2. Chronic respiratory failurecontinue supplemental oxygen  3. Noncompliance with medicationspatient has signed out multiple times [address] On the morning after admission, patient requested methadone and it was explained to her in the presence of nsg manager that she is not enrolled in a clinic. Patient also admitted she uses street methadone. Patient was counseled in the presence of nsg manager regarding the dangers of leaving, including worsening shortness of breath, death, and readmission. Patient was diffusely wheezing on my exam. She chose to leave however. 4. History of cocaine useUDS positive for methadone and opiates  5. History of opiate abusecurrently on methadone 100 mg daily which patient stated she buys on the street. [place] [facility] contacted, she was discharged from their clinic March 2020 for noncompliance, + drug screen, and inappropriate language and behavior while at the clinic. 6. History of hypertensionresumed home medications, monitor blood pressure  DVT prophylaxisHeparin  Full code. Left AMA.       Discharge Medications:   Cannot display discharge medications as patient left AMA    Follow-up: not arranged as patient left AMA    Minutes spent on discharge: greater than 30

## 2020-09-29 ENCOUNTER — APPOINTMENT (OUTPATIENT)
Dept: GENERAL RADIOLOGY | Age: 55
End: 2020-09-29
Attending: PHYSICIAN ASSISTANT
Payer: MEDICAID

## 2020-09-29 ENCOUNTER — HOSPITAL ENCOUNTER (EMERGENCY)
Age: 55
Discharge: HOME | End: 2020-09-29
Attending: EMERGENCY MEDICINE
Payer: MEDICAID

## 2020-09-29 VITALS
HEART RATE: 63 BPM | TEMPERATURE: 100.2 F | RESPIRATION RATE: 18 BRPM | DIASTOLIC BLOOD PRESSURE: 52 MMHG | OXYGEN SATURATION: 100 % | SYSTOLIC BLOOD PRESSURE: 137 MMHG

## 2020-09-29 DIAGNOSIS — R07.89 ATYPICAL CHEST PAIN: Primary | ICD-10-CM

## 2020-09-29 LAB
ALBUMIN SERPL-MCNC: 3.4 G/DL (ref 3.4–5)
ALBUMIN/GLOB SERPL: 1.1 {RATIO} (ref 0.8–1.7)
ALP SERPL-CCNC: 93 U/L (ref 45–117)
ALT SERPL-CCNC: 26 U/L (ref 13–56)
ANION GAP SERPL CALC-SCNC: 2 MMOL/L (ref 3–18)
AST SERPL-CCNC: 18 U/L (ref 10–38)
ATRIAL RATE: 63 BPM
ATRIAL RATE: 77 BPM
ATRIAL RATE: 99 BPM
BASOPHILS # BLD: 0 K/UL (ref 0–0.1)
BASOPHILS NFR BLD: 0 % (ref 0–2)
BILIRUB SERPL-MCNC: 0.4 MG/DL (ref 0.2–1)
BNP SERPL-MCNC: 563 PG/ML (ref 0–900)
BUN SERPL-MCNC: 17 MG/DL (ref 7–18)
BUN/CREAT SERPL: 14 (ref 12–20)
CALCIUM SERPL-MCNC: 8.5 MG/DL (ref 8.5–10.1)
CALCULATED P AXIS, ECG09: 64 DEGREES
CALCULATED P AXIS, ECG09: 71 DEGREES
CALCULATED P AXIS, ECG09: 76 DEGREES
CALCULATED R AXIS, ECG10: -22 DEGREES
CALCULATED R AXIS, ECG10: -25 DEGREES
CALCULATED R AXIS, ECG10: -56 DEGREES
CALCULATED T AXIS, ECG11: 104 DEGREES
CALCULATED T AXIS, ECG11: 75 DEGREES
CALCULATED T AXIS, ECG11: 83 DEGREES
CHLORIDE SERPL-SCNC: 106 MMOL/L (ref 100–111)
CK MB CFR SERPL CALC: NORMAL % (ref 0–4)
CK MB SERPL-MCNC: <1 NG/ML (ref 5–25)
CK SERPL-CCNC: 48 U/L (ref 26–192)
CO2 SERPL-SCNC: 31 MMOL/L (ref 21–32)
CREAT SERPL-MCNC: 1.22 MG/DL (ref 0.6–1.3)
DIAGNOSIS, 93000: NORMAL
DIFFERENTIAL METHOD BLD: NORMAL
EOSINOPHIL # BLD: 0.2 K/UL (ref 0–0.4)
EOSINOPHIL NFR BLD: 3 % (ref 0–5)
ERYTHROCYTE [DISTWIDTH] IN BLOOD BY AUTOMATED COUNT: 13.6 % (ref 11.6–14.5)
GLOBULIN SER CALC-MCNC: 3.1 G/DL (ref 2–4)
GLUCOSE SERPL-MCNC: 77 MG/DL (ref 74–99)
HCT VFR BLD AUTO: 40.7 % (ref 35–45)
HGB BLD-MCNC: 13 G/DL (ref 12–16)
LYMPHOCYTES # BLD: 2.5 K/UL (ref 0.9–3.6)
LYMPHOCYTES NFR BLD: 35 % (ref 21–52)
MCH RBC QN AUTO: 29.1 PG (ref 24–34)
MCHC RBC AUTO-ENTMCNC: 31.9 G/DL (ref 31–37)
MCV RBC AUTO: 91.1 FL (ref 74–97)
MONOCYTES # BLD: 0.4 K/UL (ref 0.05–1.2)
MONOCYTES NFR BLD: 6 % (ref 3–10)
NEUTS SEG # BLD: 4 K/UL (ref 1.8–8)
NEUTS SEG NFR BLD: 56 % (ref 40–73)
P-R INTERVAL, ECG05: 126 MS
P-R INTERVAL, ECG05: 132 MS
P-R INTERVAL, ECG05: 136 MS
PLATELET # BLD AUTO: 200 K/UL (ref 135–420)
PMV BLD AUTO: 10.9 FL (ref 9.2–11.8)
POTASSIUM SERPL-SCNC: 4.1 MMOL/L (ref 3.5–5.5)
PROT SERPL-MCNC: 6.5 G/DL (ref 6.4–8.2)
Q-T INTERVAL, ECG07: 386 MS
Q-T INTERVAL, ECG07: 418 MS
Q-T INTERVAL, ECG07: 484 MS
QRS DURATION, ECG06: 138 MS
QRS DURATION, ECG06: 138 MS
QRS DURATION, ECG06: 144 MS
QTC CALCULATION (BEZET), ECG08: 473 MS
QTC CALCULATION (BEZET), ECG08: 495 MS
QTC CALCULATION (BEZET), ECG08: 495 MS
RBC # BLD AUTO: 4.47 M/UL (ref 4.2–5.3)
SODIUM SERPL-SCNC: 139 MMOL/L (ref 136–145)
TROPONIN I SERPL-MCNC: <0.02 NG/ML (ref 0–0.04)
VENTRICULAR RATE, ECG03: 63 BPM
VENTRICULAR RATE, ECG03: 77 BPM
VENTRICULAR RATE, ECG03: 99 BPM
WBC # BLD AUTO: 7.2 K/UL (ref 4.6–13.2)

## 2020-09-29 PROCEDURE — 80053 COMPREHEN METABOLIC PANEL: CPT

## 2020-09-29 PROCEDURE — 99284 EMERGENCY DEPT VISIT MOD MDM: CPT

## 2020-09-29 PROCEDURE — 85025 COMPLETE CBC W/AUTO DIFF WBC: CPT

## 2020-09-29 PROCEDURE — 74011250637 HC RX REV CODE- 250/637: Performed by: PHYSICIAN ASSISTANT

## 2020-09-29 PROCEDURE — 82550 ASSAY OF CK (CPK): CPT

## 2020-09-29 PROCEDURE — 71045 X-RAY EXAM CHEST 1 VIEW: CPT

## 2020-09-29 PROCEDURE — 83880 ASSAY OF NATRIURETIC PEPTIDE: CPT

## 2020-09-29 PROCEDURE — 93005 ELECTROCARDIOGRAM TRACING: CPT

## 2020-09-29 RX ORDER — HYDROCODONE BITARTRATE AND ACETAMINOPHEN 5; 325 MG/1; MG/1
1 TABLET ORAL
Status: COMPLETED | OUTPATIENT
Start: 2020-09-29 | End: 2020-09-29

## 2020-09-29 RX ADMIN — HYDROCODONE BITARTRATE AND ACETAMINOPHEN 1 TABLET: 5; 325 TABLET ORAL at 12:46

## 2020-09-29 NOTE — ED PROVIDER NOTES
EMERGENCY DEPARTMENT HISTORY AND PHYSICAL EXAM    Date: 2020  Patient Name: Kristan Apgar    History of Presenting Illness     Chief Complaint   Patient presents with    Chest Pain         History Provided By: patient        Additional History (Context): Kristan Apgar is a 50-year-old female, well-known to this emergency department, with complaint of chest pain. Patient states pain started last night, states she was seen in this emergency department yesterday for the same pain, had a negative work-up and was discharged home. Patient states pain is continued and states only thing that helped was IV morphine. Patient states she thinks she needs this medication to help her chest pain at this time. States pain is not changed in severity or character. Patient has no shortness of breath, back pain, headache, dizziness, abdominal pain, numbness, tingling, diaphoresis, shoulder pain or neck pain. States she has not taken anything for pain and denies a change in her appetite as she has been able to eat and drink this morning without difficulty. PCP: Gricelda Isidro NP    Current Outpatient Medications   Medication Sig Dispense Refill    cloNIDine HCL (CATAPRES) 0.2 mg tablet Take 1 Tab by mouth two (2) times a day for 30 days. 30 Tab 1    predniSONE (DELTASONE) 50 mg tablet Take 1 Tab by mouth daily for 3 days. 3 Tab 0    albuterol sulfate 90 mcg/actuation aebs Take 2 Puffs by inhalation every four to six (4-6) hours as needed for Wheezing for up to 30 days. 1 Inhaler 0    albuterol-ipratropium (DUO-NEB) 2.5 mg-0.5 mg/3 ml nebu 3 mL by Nebulization route every four (4) hours as needed for Wheezing, Shortness of Breath or Cough. 30 Nebule 0    arformoteroL (BROVANA) 15 mcg/2 mL nebu neb solution 2 mL by Nebulization route two (2) times a day. Indications: bronchospasm prevention with COPD 60 Vial 0    budesonide (PULMICORT) 0.5 mg/2 mL nbsp 2 mL by Nebulization route two (2) times a day.  60 Each 0  fluticasone furoate-vilanteroL (Breo Ellipta) 200-25 mcg/dose inhaler Take 1 Puff by inhalation daily. 1 Inhaler 0    tiotropium (SPIRIVA) 18 mcg inhalation capsule Take 1 Cap by inhalation daily for 30 days. 30 Cap 0    citalopram (CeleXA) 20 mg tablet 1 tablet      gemfibroziL (LOPID) 600 mg tablet 1 tablet      hydroCHLOROthiazide (HYDRODIURIL) 25 mg tablet 1 tablet in the morning      isosorbide mononitrate ER (IMDUR) 30 mg tablet Take 30 mg by mouth daily.  lisinopriL (PRINIVIL, ZESTRIL) 20 mg tablet 1 tablet      sucralfate (CARAFATE) 100 mg/mL suspension Take 1,000 mg by mouth.  QUEtiapine (SEROquel) 50 mg tablet TAKE 1 TABLET BY MOUTH AT BEDTIME FOR MOOD      furosemide (Lasix) 20 mg tablet Take 1 Tab by mouth daily. 5 Tab 0    amLODIPine (NORVASC) 10 mg tablet Take 1 Tab by mouth daily. 15 Tab 0    ALPRAZolam (Xanax) 0.5 mg tablet Take 1 Tab by mouth every eight (8) hours as needed for Anxiety. Max Daily Amount: 1.5 mg. 20 Tab 0    aspirin delayed-release 81 mg tablet Take 1 Tab by mouth daily. 30 Tab 0    famotidine (PEPCID) 20 mg tablet Take 1 Tab by mouth daily. 30 Tab 1    OXYGEN-AIR DELIVERY SYSTEMS 3 L by IntraNASal route as needed for Other (sob).  docusate sodium (COLACE) 100 mg capsule Take 1 Cap by mouth two (2) times a day. 120 Cap 0    atorvastatin (LIPITOR) 20 mg tablet Take 1 Tab by mouth nightly. 30 Tab 0    metoprolol tartrate (LOPRESSOR) 25 mg tablet Take 0.5 Tabs by mouth every twelve (12) hours. 30 Tab 0    roflumilast (DALIRESP) 500 mcg tab tablet Take 1 Tab by mouth daily. 30 Tab 0    naloxone (NARCAN) 4 mg/actuation nasal spray Use 1 spray intranasally, then discard. Repeat with new spray every 2 min as needed for opioid overdose symptoms, alternating nostrils. 1 Each 0    montelukast (SINGULAIR) 10 mg tablet Take 1 Tab by mouth nightly.  30 Tab 1       Past History     Past Medical History:  Past Medical History:   Diagnosis Date    CHF (congestive heart failure) ([address]     Chronic respiratory failure with hypoxia ([address] 9/7/2020    CKD (chronic kidney disease) stage 3, GFR 30-59 ml/min ([facility]) 10/31/2019    Cocaine abuse ([address] 11/26/2017    COPD (chronic obstructive pulmonary disease) with chronic bronchitis ([address] 12/19/2017    Dependence on supplemental oxygen     Depression 3/22/2020    Drug-seeking behavior 11/19/2016    Endocrine disease     thyroid issues    Essential hypertension 3/10/2017    Fe deficiency anemia 10/27/2012    Fibromyalgia 12/16/2016    Gastroesophageal reflux disease without esophagitis 10/10/2016    Gastrointestinal disorder     \"blockage in my stomach\"    Hypercholesterolemia     Hypertension     Hypertensive heart failure ([address] 12/19/2017    Morbid obesity due to excess calories ([address] 3/10/2017    NSTEMI (non-ST elevated myocardial infarction) ([address] 3/22/2020    On home O2 12/3/2015    Overview:  2L at home per pt for approx 8 years    Polysubstance abuse ([address] 9/15/2018    Respiratory failure ([address] 1/11/2017    S/P hernia repair 10/31/2019    Sleep apnea 12/19/2017    T wave inversion in EKG 3/9/2019    Tobacco use 1/45/9317    Uncomplicated severe persistent asthma 12/13/2016    Vocal cord disease 12/7/2016       Past Surgical History:  Past Surgical History:   Procedure Laterality Date    HX GI      Exploratory laparotomy with lysis of adhesions and primary repair of incarcerated umbilical hernia       Family History:  No family history on file. Social History:  Social History     Tobacco Use    Smoking status: Current Every Day Smoker     Packs/day: 0.25    Smokeless tobacco: Current User   Substance Use Topics    Alcohol use: No     Comment: socially    Drug use: Not Currently     Types: Heroin       Allergies:  No Known Allergies      Review of Systems       Review of Systems   Constitutional: Negative for chills and fever.    HENT: Negative for nasal congestion, sore throat, rhinorrhea  Eyes: Negative. Respiratory: Negative for cough and negative for shortness of breath. Cardiovascular: Negative for chest pain and palpitations. Gastrointestinal: Negative for abdominal pain, constipation, diarrhea, nausea and vomiting. Genitourinary: Negative. Negative for difficulty urinating and flank pain. Musculoskeletal: Negative for back pain. Negative for gait problem and neck pain. Skin: Negative. Allergic/Immunologic: Negative. Neurological: Negative for dizziness, weakness, numbness and headaches. Psychiatric/Behavioral: Negative. All other systems reviewed and are negative. All Other Systems Negative  Physical Exam     Vitals:    09/29/20 0901 09/29/20 1200   BP: 132/72 (!) 137/52   Pulse: 63    Resp: 18    Temp: 100.2 °F (37.9 °C)    SpO2: 100% 100%     Physical Exam  Vitals signs and nursing note reviewed. Constitutional:       General: She is not in acute distress. Appearance: She is well-developed. She is not diaphoretic. Comments: NAD, well hydrated, non toxic     HENT:      Head: Normocephalic and atraumatic. Nose: Nose normal.      Mouth/Throat:      Pharynx: No oropharyngeal exudate. Eyes:      Conjunctiva/sclera: Conjunctivae normal.      Pupils: Pupils are equal, round, and reactive to light. Neck:      Musculoskeletal: Normal range of motion and neck supple. Cardiovascular:      Rate and Rhythm: Normal rate and regular rhythm. Heart sounds: Normal heart sounds. No murmur. Pulmonary:      Effort: Pulmonary effort is normal. No tachypnea, accessory muscle usage or respiratory distress. Breath sounds: Normal breath sounds. No stridor. No wheezing or rales. Abdominal:      General: There is no distension. Palpations: Abdomen is soft. Tenderness: There is no abdominal tenderness. There is no guarding. Musculoskeletal: Normal range of motion. Lymphadenopathy:      Cervical: No cervical adenopathy.    Skin:     General: Skin is warm.      Findings: No rash. Neurological:      Mental Status: She is alert and oriented to person, place, and time. Cranial Nerves: No cranial nerve deficit. Coordination: Coordination normal.   Psychiatric:         Behavior: Behavior normal.           Diagnostic Study Results     Labs -     Recent Results (from the past 12 hour(s))   EKG, 12 LEAD, INITIAL    Collection Time: 09/29/20  9:16 AM   Result Value Ref Range    Ventricular Rate 63 BPM    Atrial Rate 63 BPM    P-R Interval 136 ms    QRS Duration 144 ms    Q-T Interval 484 ms    QTC Calculation (Bezet) 495 ms    Calculated P Axis 64 degrees    Calculated R Axis -25 degrees    Calculated T Axis 104 degrees    Diagnosis       Normal sinus rhythm  Left bundle branch block  Abnormal ECG  When compared with ECG of 28-SEP-2020 15:39,  T wave inversion more evident in Lateral leads  Confirmed by Natalia Nava ([id number]) on 9/29/2020 1:20:21 PM     CBC WITH AUTOMATED DIFF    Collection Time: 09/29/20 10:28 AM   Result Value Ref Range    WBC 7.2 4.6 - 13.2 K/uL    RBC 4.47 4.20 - 5.30 M/uL    HGB 13.0 12.0 - 16.0 g/dL    HCT 40.7 35.0 - 45.0 %    MCV 91.1 74.0 - 97.0 FL    MCH 29.1 24.0 - 34.0 PG    MCHC 31.9 31.0 - 37.0 g/dL    RDW 13.6 11.6 - 14.5 %    PLATELET 363 162 - 920 K/uL    MPV 10.9 9.2 - 11.8 FL    NEUTROPHILS 56 40 - 73 %    LYMPHOCYTES 35 21 - 52 %    MONOCYTES 6 3 - 10 %    EOSINOPHILS 3 0 - 5 %    BASOPHILS 0 0 - 2 %    ABS. NEUTROPHILS 4.0 1.8 - 8.0 K/UL    ABS. LYMPHOCYTES 2.5 0.9 - 3.6 K/UL    ABS. MONOCYTES 0.4 0.05 - 1.2 K/UL    ABS. EOSINOPHILS 0.2 0.0 - 0.4 K/UL    ABS.  BASOPHILS 0.0 0.0 - 0.1 K/UL    DF AUTOMATED     METABOLIC PANEL, COMPREHENSIVE    Collection Time: 09/29/20 10:28 AM   Result Value Ref Range    Sodium 139 136 - 145 mmol/L    Potassium 4.1 3.5 - 5.5 mmol/L    Chloride 106 100 - 111 mmol/L    CO2 31 21 - 32 mmol/L    Anion gap 2 (L) 3.0 - 18 mmol/L    Glucose 77 74 - 99 mg/dL    BUN 17 7.0 - 18 MG/DL    Creatinine 1. 22 0.6 - 1.3 MG/DL    BUN/Creatinine ratio 14 12 - 20      GFR est AA 55 (L) >60 ml/min/1.73m2    GFR est non-AA 46 (L) >60 ml/min/1.73m2    Calcium 8.5 8.5 - 10.1 MG/DL    Bilirubin, total 0.4 0.2 - 1.0 MG/DL    ALT (SGPT) 26 13 - 56 U/L    AST (SGOT) 18 10 - 38 U/L    Alk. phosphatase 93 45 - 117 U/L    Protein, total 6.5 6.4 - 8.2 g/dL    Albumin 3.4 3.4 - 5.0 g/dL    Globulin 3.1 2.0 - 4.0 g/dL    A-G Ratio 1.1 0.8 - 1.7     CARDIAC PANEL,(CK, CKMB & TROPONIN)    Collection Time: 09/29/20 10:28 AM   Result Value Ref Range    CK - MB <1.0 <3.6 ng/ml    CK-MB Index  0.0 - 4.0 %     CALCULATION NOT PERFORMED WHEN RESULT IS BELOW LINEAR LIMIT    CK 48 26 - 192 U/L    Troponin-I, QT <0.02 0.0 - 0.045 NG/ML   NT-PRO BNP    Collection Time: 09/29/20 10:28 AM   Result Value Ref Range    NT pro- 0 - 900 PG/ML       Radiologic Studies -   XR CHEST PORT   Final Result   IMPRESSION:   No acute cardiopulmonary process. CT Results  (Last 48 hours)    None        CXR Results  (Last 48 hours)               09/29/20 0905  XR CHEST PORT Final result    Impression:  IMPRESSION:   No acute cardiopulmonary process. Narrative:  ONE VIEW CHEST RADIOGRAPH        INDICATION: chest pain, sob, and/or arrhythmia. COMPARISON: Chest radiograph 9/20/2020. TECHNIQUE: AP view of the chest       FINDINGS:       LINES/TUBES: None. MEDIASTINUM: Cardiac silhouette is mildly prominent, but this is accentuated by   hypoinflation, not significantly changed from prior. LUNGS: Lungs are mildly hypoinflated. No consolidation, pleural effusion, or   pneumothorax. BONES/OTHER: No acute osseous abnormality. 09/28/20 1837  XR CHEST PORT Final result    Impression:  IMPRESSION:       Hypoinflation without acute findings. Narrative:  PORTABLE CHEST RADIOGRAPH        CPT CODE: 21252        INDICATION: Chest pain. COMPARISON: 9/25/2020.        FINDINGS:       Frontal view of the chest obtained at 1820 hours. Lungs are hypoinflated. Cardiomediastinal silhouette is prominent but this is emphasized by   hypoinflation. Pulmonary vasculature is normal. No focal opacity, significant   pleural effusion or pneumothorax. Medical Decision Making   I am the first provider for this patient. I reviewed the vital signs, available nursing notes, past medical history, past surgical history, family history and social history. Vital Signs-Reviewed the patient's vital signs. Records Reviewed: Nursing notes, old medical records and any previous labs, imaging, visits, consultations pertinent to patient care    Procedures:  Procedures      ED Course: Progress Notes, Reevaluation, and Consults:      Provider Notes (Medical Decision Making):     Patient was felt to be low risk for major adverse cardiac event, was  informed about the risks and benefits and alternatives of inpatient versus outpatient workup. At this time patient is stable for outpatient management including follow-up with primary care physician/cardiology for reevaluation within 72 hours. Patient is aware that no evaluation in the emergency department can ensure 0% risk, patient will return to the emergency department with any concerns. Dr. Yana Caro knows patient and agrees that she can be discharged with work up done thus far. Does not need further work up or medication and can be followed outpt but pcp       MED RECONCILIATION:  No current facility-administered medications for this encounter. Current Outpatient Medications   Medication Sig    cloNIDine HCL (CATAPRES) 0.2 mg tablet Take 1 Tab by mouth two (2) times a day for 30 days.  predniSONE (DELTASONE) 50 mg tablet Take 1 Tab by mouth daily for 3 days.  albuterol sulfate 90 mcg/actuation aebs Take 2 Puffs by inhalation every four to six (4-6) hours as needed for Wheezing for up to 30 days.     albuterol-ipratropium (DUO-NEB) 2.5 mg-0.5 mg/3 ml nebu 3 mL by Nebulization route every four (4) hours as needed for Wheezing, Shortness of Breath or Cough.  arformoteroL (BROVANA) 15 mcg/2 mL nebu neb solution 2 mL by Nebulization route two (2) times a day. Indications: bronchospasm prevention with COPD    budesonide (PULMICORT) 0.5 mg/2 mL nbsp 2 mL by Nebulization route two (2) times a day.  fluticasone furoate-vilanteroL (Breo Ellipta) 200-25 mcg/dose inhaler Take 1 Puff by inhalation daily.  tiotropium (SPIRIVA) 18 mcg inhalation capsule Take 1 Cap by inhalation daily for 30 days.  citalopram (CeleXA) 20 mg tablet 1 tablet    gemfibroziL (LOPID) 600 mg tablet 1 tablet    hydroCHLOROthiazide (HYDRODIURIL) 25 mg tablet 1 tablet in the morning    isosorbide mononitrate ER (IMDUR) 30 mg tablet Take 30 mg by mouth daily.  lisinopriL (PRINIVIL, ZESTRIL) 20 mg tablet 1 tablet    sucralfate (CARAFATE) 100 mg/mL suspension Take 1,000 mg by mouth.  QUEtiapine (SEROquel) 50 mg tablet TAKE 1 TABLET BY MOUTH AT BEDTIME FOR MOOD    furosemide (Lasix) 20 mg tablet Take 1 Tab by mouth daily.  amLODIPine (NORVASC) 10 mg tablet Take 1 Tab by mouth daily.  ALPRAZolam (Xanax) 0.5 mg tablet Take 1 Tab by mouth every eight (8) hours as needed for Anxiety. Max Daily Amount: 1.5 mg.    aspirin delayed-release 81 mg tablet Take 1 Tab by mouth daily.  famotidine (PEPCID) 20 mg tablet Take 1 Tab by mouth daily.  OXYGEN-AIR DELIVERY SYSTEMS 3 L by IntraNASal route as needed for Other (sob).  docusate sodium (COLACE) 100 mg capsule Take 1 Cap by mouth two (2) times a day.  atorvastatin (LIPITOR) 20 mg tablet Take 1 Tab by mouth nightly.  metoprolol tartrate (LOPRESSOR) 25 mg tablet Take 0.5 Tabs by mouth every twelve (12) hours.  roflumilast (DALIRESP) 500 mcg tab tablet Take 1 Tab by mouth daily.  naloxone (NARCAN) 4 mg/actuation nasal spray Use 1 spray intranasally, then discard.  Repeat with new spray every 2 min as needed for opioid overdose symptoms, alternating nostrils.  montelukast (SINGULAIR) 10 mg tablet Take 1 Tab by mouth nightly. Disposition:  home    DISCHARGE NOTE:     Pt has been reexamined. Patient has no new complaints, changes, or physical findings. Care plan outlined and precautions discussed. Discussed proper way to take medications. Discussed treatment plan, return precautions, symptomatic relief, and expected time to improvement. All questions answered. Patient is stable for discharge and outpatient management. Patient is ready to go home. Follow-up Information     Follow up With Specialties Details Why Contact Info    [facility] EMERGENCY DEPT Emergency Medicine   [address] [place] [id number]  [facility], [address] [facility], NP Nurse Practitioner   [address] INPATIENT REHABILITATION Cardiothoracic Surgery   [facility] [place]  [place] [id number]  [phone]          Current Discharge Medication List                Diagnosis     Clinical Impression:   1. Atypical chest pain            Dictation disclaimer:  Please note that this dictation was completed with [employer], the computer voice recognition software. Quite often unanticipated grammatical, syntax, homophones, and other interpretive errors are inadvertently transcribed by the computer software. Please disregard these errors. Please excuse any errors that have escaped final proofreading.

## 2020-09-30 ENCOUNTER — HOSPITAL ENCOUNTER (EMERGENCY)
Age: 55
Discharge: HOME | End: 2020-10-01
Attending: EMERGENCY MEDICINE
Payer: MEDICAID

## 2020-09-30 ENCOUNTER — APPOINTMENT (OUTPATIENT)
Dept: GENERAL RADIOLOGY | Age: 55
End: 2020-09-30
Attending: EMERGENCY MEDICINE
Payer: MEDICAID

## 2020-09-30 VITALS
OXYGEN SATURATION: 100 % | DIASTOLIC BLOOD PRESSURE: 77 MMHG | HEART RATE: 71 BPM | WEIGHT: 170 LBS | SYSTOLIC BLOOD PRESSURE: 135 MMHG | RESPIRATION RATE: 19 BRPM | TEMPERATURE: 98.6 F | HEIGHT: 59 IN | BODY MASS INDEX: 34.27 KG/M2

## 2020-09-30 DIAGNOSIS — S83.91XA SPRAIN OF RIGHT KNEE, UNSPECIFIED LIGAMENT, INITIAL ENCOUNTER: ICD-10-CM

## 2020-09-30 DIAGNOSIS — J44.1 ACUTE EXACERBATION OF CHRONIC OBSTRUCTIVE PULMONARY DISEASE (COPD) (HCC): Primary | ICD-10-CM

## 2020-09-30 PROCEDURE — 73562 X-RAY EXAM OF KNEE 3: CPT

## 2020-09-30 PROCEDURE — 74011000250 HC RX REV CODE- 250: Performed by: EMERGENCY MEDICINE

## 2020-09-30 PROCEDURE — 94640 AIRWAY INHALATION TREATMENT: CPT

## 2020-09-30 PROCEDURE — 74011250637 HC RX REV CODE- 250/637: Performed by: EMERGENCY MEDICINE

## 2020-09-30 PROCEDURE — 99285 EMERGENCY DEPT VISIT HI MDM: CPT

## 2020-09-30 RX ORDER — DEXAMETHASONE SODIUM PHOSPHATE 4 MG/ML
10 INJECTION, SOLUTION INTRA-ARTICULAR; INTRALESIONAL; INTRAMUSCULAR; INTRAVENOUS; SOFT TISSUE
Status: COMPLETED | OUTPATIENT
Start: 2020-09-30 | End: 2020-09-30

## 2020-09-30 RX ORDER — IPRATROPIUM BROMIDE AND ALBUTEROL SULFATE 2.5; .5 MG/3ML; MG/3ML
9 SOLUTION RESPIRATORY (INHALATION) ONCE
Status: COMPLETED | OUTPATIENT
Start: 2020-09-30 | End: 2020-09-30

## 2020-09-30 RX ORDER — ACETAMINOPHEN 500 MG
1000 TABLET ORAL
Status: COMPLETED | OUTPATIENT
Start: 2020-09-30 | End: 2020-09-30

## 2020-09-30 RX ADMIN — ACETAMINOPHEN 1000 MG: 500 TABLET ORAL at 22:54

## 2020-09-30 RX ADMIN — IPRATROPIUM BROMIDE AND ALBUTEROL SULFATE 9 ML: .5; 3 SOLUTION RESPIRATORY (INHALATION) at 20:34

## 2020-09-30 RX ADMIN — DEXAMETHASONE SODIUM PHOSPHATE 10 MG: 4 INJECTION, SOLUTION INTRAMUSCULAR; INTRAVENOUS at 20:33

## 2020-10-01 VITALS
RESPIRATION RATE: 18 BRPM | DIASTOLIC BLOOD PRESSURE: 89 MMHG | OXYGEN SATURATION: 100 % | HEART RATE: 85 BPM | SYSTOLIC BLOOD PRESSURE: 210 MMHG

## 2020-10-01 DIAGNOSIS — J44.1 ACUTE EXACERBATION OF CHRONIC OBSTRUCTIVE PULMONARY DISEASE (COPD) (HCC): Primary | ICD-10-CM

## 2020-10-01 LAB
ATRIAL RATE: 68 BPM
CALCULATED P AXIS, ECG09: 90 DEGREES
CALCULATED R AXIS, ECG10: -78 DEGREES
CALCULATED T AXIS, ECG11: 82 DEGREES
DIAGNOSIS, 93000: NORMAL
P-R INTERVAL, ECG05: 154 MS
Q-T INTERVAL, ECG07: 452 MS
QRS DURATION, ECG06: 132 MS
QTC CALCULATION (BEZET), ECG08: 480 MS
VENTRICULAR RATE, ECG03: 68 BPM

## 2020-10-01 PROCEDURE — 99284 EMERGENCY DEPT VISIT MOD MDM: CPT

## 2020-10-01 PROCEDURE — 93005 ELECTROCARDIOGRAM TRACING: CPT

## 2020-10-01 PROCEDURE — 94640 AIRWAY INHALATION TREATMENT: CPT

## 2020-10-01 PROCEDURE — 74011250637 HC RX REV CODE- 250/637: Performed by: EMERGENCY MEDICINE

## 2020-10-01 PROCEDURE — 74011000250 HC RX REV CODE- 250: Performed by: EMERGENCY MEDICINE

## 2020-10-01 RX ORDER — FUROSEMIDE 40 MG/1
40 TABLET ORAL ONCE
Status: COMPLETED | OUTPATIENT
Start: 2020-10-01 | End: 2020-10-01

## 2020-10-01 RX ORDER — DEXAMETHASONE SODIUM PHOSPHATE 4 MG/ML
10 INJECTION, SOLUTION INTRA-ARTICULAR; INTRALESIONAL; INTRAMUSCULAR; INTRAVENOUS; SOFT TISSUE
Status: COMPLETED | OUTPATIENT
Start: 2020-10-01 | End: 2020-10-01

## 2020-10-01 RX ORDER — CLONIDINE HYDROCHLORIDE 0.1 MG/1
0.2 TABLET ORAL
Status: COMPLETED | OUTPATIENT
Start: 2020-10-01 | End: 2020-10-01

## 2020-10-01 RX ORDER — IPRATROPIUM BROMIDE AND ALBUTEROL SULFATE 2.5; .5 MG/3ML; MG/3ML
9 SOLUTION RESPIRATORY (INHALATION) ONCE
Status: COMPLETED | OUTPATIENT
Start: 2020-10-01 | End: 2020-10-01

## 2020-10-01 RX ORDER — CLONAZEPAM 0.5 MG/1
0.5 TABLET ORAL ONCE
Status: COMPLETED | OUTPATIENT
Start: 2020-10-01 | End: 2020-10-01

## 2020-10-01 RX ADMIN — IPRATROPIUM BROMIDE AND ALBUTEROL SULFATE 9 ML: .5; 3 SOLUTION RESPIRATORY (INHALATION) at 05:04

## 2020-10-01 RX ADMIN — FUROSEMIDE 40 MG: 40 TABLET ORAL at 05:35

## 2020-10-01 RX ADMIN — CLONAZEPAM 0.5 MG: 0.5 TABLET ORAL at 05:35

## 2020-10-01 RX ADMIN — DEXAMETHASONE SODIUM PHOSPHATE 10 MG: 4 INJECTION, SOLUTION INTRAMUSCULAR; INTRAVENOUS at 05:25

## 2020-10-01 RX ADMIN — CLONIDINE HYDROCHLORIDE 0.2 MG: 0.1 TABLET ORAL at 05:35

## 2020-10-01 NOTE — DISCHARGE INSTRUCTIONS
Patient Education        Chronic Obstructive Pulmonary Disease (COPD): Care Instructions  Your Care Instructions     Chronic obstructive pulmonary disease (COPD) is a general term for a group of lung diseases, including emphysema and chronic bronchitis. People with COPD have decreased airflow in and out of the lungs, which makes it hard to breathe. The airways also can get clogged with thick mucus. Cigarette smoking is a major cause of COPD. Although there is no cure for COPD, you can slow its progress. Following your treatment plan and taking care of yourself can help you feel better and live longer. Follow-up care is a key part of your treatment and safety. Be sure to make and go to all appointments, and call your doctor if you are having problems. It's also a good idea to know your test results and keep a list of the medicines you take. How can you care for yourself at home? Staying healthy    · Do not smoke. This is the most important step you can take to prevent more damage to your lungs. If you need help quitting, talk to your doctor about stop-smoking programs and medicines. These can increase your chances of quitting for good.     · Avoid colds and flu. Get a pneumococcal vaccine shot. If you have had one before, ask your doctor whether you need a second dose. Get the flu vaccine every fall. If you must be around people with colds or the flu, wash your hands often.     · Avoid secondhand smoke, air pollution, and high altitudes. Also avoid cold, dry air and hot, humid air. Stay at home with your windows closed when air pollution is bad. Medicines and oxygen therapy    · Take your medicines exactly as prescribed. Call your doctor if you think you are having a problem with your medicine. You may be taking medicines such as:  ? Bronchodilators. These help open your airways and make breathing easier. They are either short-acting (work for 6 to 9 hours) or long-acting (work for 24 hours).  You inhale most bronchodilators, so they start to act quickly. Always carry your quick-relief inhaler with you in case you need it while you are away from home. ? Corticosteroids (prednisone, budesonide). These reduce airway inflammation. They come in pill or inhaled form. You must take these medicines every day for them to work well.     · Ask your doctor or pharmacist if a spacer is right for you. A spacer may help you get more inhaled medicine to your lungs. If you use one, ask how to use it properly.     · Do not take any vitamins, over-the-counter medicine, or herbal products without talking to your doctor first.     · If your doctor prescribed antibiotics, take them as directed. Do not stop taking them just because you feel better. You need to take the full course of antibiotics.     · If you use oxygen therapy, use the flow rate your doctor has recommended. Don't change it without talking to your doctor first. Oxygen therapy boosts the amount of oxygen in your blood and helps you breathe easier. Activity    · Get regular exercise. Walking is an easy way to get exercise. Start out slowly, and walk a little more each day.     · Pay attention to your breathing. You are exercising too hard if you can't talk while you exercise.     · Take short rest breaks when doing household chores and other activities.     · Learn breathing methods--such as breathing through pursed lips--to help you become less short of breath.     · If your doctor has not set you up with a pulmonary rehabilitation program, ask if rehab is right for you. Rehab includes exercise programs, education about your disease and how to manage it, help with diet and other changes, and emotional support. Diet    · Eat regular, healthy meals. Use bronchodilators about 1 hour before you eat to make it easier to eat. Eat several small meals instead of three large ones.  Drink beverages at the end of the meal. Avoid foods that are hard to chew.     · Eat foods that contain protein so you don't lose muscle mass.     · Talk with your doctor if you gain too much weight or if you lose weight without trying. Mental health    · Talk to your family, friends, or a therapist about your feelings. Some people feel frightened, angry, hopeless, helpless, and even guilty. Talking openly about bad feelings can help you cope. If these feelings last, talk to your doctor. When should you call for help? Call 911 anytime you think you may need emergency care. For example, call if:    · You have severe trouble breathing. Call your doctor now or seek immediate medical care if:    · You have new or worse trouble breathing.     · You cough up blood.     · You have a fever. Watch closely for changes in your health, and be sure to contact your doctor if:    · You cough more deeply or more often, especially if you notice more mucus or a change in the color of your mucus.     · You have new or worse swelling in your legs or belly.     · You are not getting better as expected. Where can you learn more? Go to http://www.gray.com/  Enter T812 in the search box to learn more about \"Chronic Obstructive Pulmonary Disease (COPD): Care Instructions. \"  Current as of: February 24, 2020               Content Version: 12.6  © 2006-2020 [employer]. Care instructions adapted under license by [employer] (which disclaims liability or warranty for this information). If you have questions about a medical condition or this instruction, always ask your healthcare professional. [place] [address] any warranty or liability for your use of this information.

## 2020-10-01 NOTE — ED PROVIDER NOTES
EMERGENCY DEPARTMENT HISTORY AND PHYSICAL EXAM      Date: 10/1/2020  Patient Name: Jazmine Seth    History of Presenting Illness     Chief Complaint   Patient presents with    Shortness of Breath       History (Context): Jazmine Seth is a 63 y.o. woman well-known to this department to myself personally who has COPD, CHF, coronary artery disease, and a complicated set of active comorbid conditions as noted below in the past medical history, who presents with subacute onset, severe, constant dyspnea. This started a few hours ago. Dyspnea exacerbated by lying flat, anxiety. The patient is in police custody which prompted her arrival.  The patient was not having severe symptoms en route to half-way. On review of systems, the patient denies chest pain, back pain, leg swelling, leg pain, recent travel, fever, chills, trauma, back pain, abdominal pain, nausea, vomiting, diaphoresis. PCP: Sterling Mckeon NP    Current Facility-Administered Medications   Medication Dose Route Frequency Provider Last Rate Last Dose    dexamethasone (DECADRON) 4 mg/mL Oral 10 mg  10 mg Oral FRANCISCO Adkins MD         Current Outpatient Medications   Medication Sig Dispense Refill    cloNIDine HCL (CATAPRES) 0.2 mg tablet Take 1 Tab by mouth two (2) times a day for 30 days. 30 Tab 1    predniSONE (DELTASONE) 50 mg tablet Take 1 Tab by mouth daily for 3 days. 3 Tab 0    albuterol sulfate 90 mcg/actuation aebs Take 2 Puffs by inhalation every four to six (4-6) hours as needed for Wheezing for up to 30 days. 1 Inhaler 0    albuterol-ipratropium (DUO-NEB) 2.5 mg-0.5 mg/3 ml nebu 3 mL by Nebulization route every four (4) hours as needed for Wheezing, Shortness of Breath or Cough. 30 Nebule 0    arformoteroL (BROVANA) 15 mcg/2 mL nebu neb solution 2 mL by Nebulization route two (2) times a day.  Indications: bronchospasm prevention with COPD 60 Vial 0    budesonide (PULMICORT) 0.5 mg/2 mL nbsp 2 mL by Nebulization route two (2) times a day. 60 Each 0    fluticasone furoate-vilanteroL (Breo Ellipta) 200-25 mcg/dose inhaler Take 1 Puff by inhalation daily. 1 Inhaler 0    tiotropium (SPIRIVA) 18 mcg inhalation capsule Take 1 Cap by inhalation daily for 30 days. 30 Cap 0    citalopram (CeleXA) 20 mg tablet 1 tablet      gemfibroziL (LOPID) 600 mg tablet 1 tablet      hydroCHLOROthiazide (HYDRODIURIL) 25 mg tablet 1 tablet in the morning      isosorbide mononitrate ER (IMDUR) 30 mg tablet Take 30 mg by mouth daily.  lisinopriL (PRINIVIL, ZESTRIL) 20 mg tablet 1 tablet      sucralfate (CARAFATE) 100 mg/mL suspension Take 1,000 mg by mouth.  QUEtiapine (SEROquel) 50 mg tablet TAKE 1 TABLET BY MOUTH AT BEDTIME FOR MOOD      furosemide (Lasix) 20 mg tablet Take 1 Tab by mouth daily. 5 Tab 0    amLODIPine (NORVASC) 10 mg tablet Take 1 Tab by mouth daily. 15 Tab 0    ALPRAZolam (Xanax) 0.5 mg tablet Take 1 Tab by mouth every eight (8) hours as needed for Anxiety. Max Daily Amount: 1.5 mg. 20 Tab 0    aspirin delayed-release 81 mg tablet Take 1 Tab by mouth daily. 30 Tab 0    famotidine (PEPCID) 20 mg tablet Take 1 Tab by mouth daily. 30 Tab 1    OXYGEN-AIR DELIVERY SYSTEMS 3 L by IntraNASal route as needed for Other (sob).  docusate sodium (COLACE) 100 mg capsule Take 1 Cap by mouth two (2) times a day. 120 Cap 0    atorvastatin (LIPITOR) 20 mg tablet Take 1 Tab by mouth nightly. 30 Tab 0    metoprolol tartrate (LOPRESSOR) 25 mg tablet Take 0.5 Tabs by mouth every twelve (12) hours. 30 Tab 0    roflumilast (DALIRESP) 500 mcg tab tablet Take 1 Tab by mouth daily. 30 Tab 0    naloxone (NARCAN) 4 mg/actuation nasal spray Use 1 spray intranasally, then discard. Repeat with new spray every 2 min as needed for opioid overdose symptoms, alternating nostrils. 1 Each 0    montelukast (SINGULAIR) 10 mg tablet Take 1 Tab by mouth nightly.  30 Tab 1       Past History     Past Medical History:  Past Medical History: Diagnosis Date    [facility]congestive heart failure) ([facility])     Chronic respiratory failure with hypoxia ([address] 9/7/2020    CKD (chronic kidney disease) stage 3, GFR 30-59 ml/min 10/31/2019    Cocaine abuse ([address] 11/26/2017    COPD (chronic obstructive pulmonary disease) with chronic bronchitis ([address] 12/19/2017    Dependence on supplemental oxygen     Depression 3/22/2020    Drug-seeking behavior 11/19/2016    Endocrine disease     thyroid issues    Essential hypertension 3/10/2017    Fe deficiency anemia 10/27/2012    Fibromyalgia 12/16/2016    Gastroesophageal reflux disease without esophagitis 10/10/2016    Gastrointestinal disorder     \"blockage in my stomach\"    Hypercholesterolemia     Hypertension     Hypertensive heart failure ([address] 12/19/2017    Morbid obesity due to excess calories ([address] 3/10/2017    NSTEMI (non-ST elevated myocardial infarction) ([address] 3/22/2020    On home O2 12/3/2015    Overview:  2L at home per pt for approx 8 years    Polysubstance abuse ([address] 9/15/2018    Respiratory failure ([address] 1/11/2017    S/P hernia repair 10/31/2019    Sleep apnea 12/19/2017    T wave inversion in EKG 3/9/2019    Tobacco use 0/83/1433    Uncomplicated severe persistent asthma 12/13/2016    Vocal cord disease 12/7/2016       Past Surgical History:  Past Surgical History:   Procedure Laterality Date    HX GI      Exploratory laparotomy with lysis of adhesions and primary repair of incarcerated umbilical hernia       Family History:  History reviewed. No pertinent family history.     Social History:  Social History     Tobacco Use    Smoking status: Current Every Day Smoker     Packs/day: 0.25    Smokeless tobacco: Current User   Substance Use Topics    Alcohol use: No     Comment: socially    Drug use: Not Currently     Types: Heroin       Allergies:  No Known Allergies    PMH, PSH, family history, social history, allergies reviewed with the patient with significant items noted above.  Review of Systems   As per HPI, otherwise reviewed and negative. Physical Exam   Vitals reviewed    Gen: In mild to moderate respiratory distress  HEENT: Normocephalic, sclera anicteric  Cardiovascular: Normal rate, regular rhythm, no murmurs, rubs, gallops. Pulses intact and equal distally. Pulmonary: Moderate respiratory distress. Tachypneic. No stridor. Crackles throughout lower fields. Wheezes throughout entire chest.  ABD: Soft, nontender, nondistended. Neuro: Alert. Normal speech. Normal mentation. Psych: Normal thought content and thought processes. : No CVA tenderness  EXT: No edema. Moves all extremities well. No cyanosis or clubbing. Skin: Warm and well-perfused. Diagnostic Study Results     Labs -   No results found for this or any previous visit (from the past 12 hour(s)). Radiologic Studies -   No orders to display     CT Results  (Last 48 hours)    None        CXR Results  (Last 48 hours)               09/29/20 0905  XR CHEST PORT Final result    Impression:  IMPRESSION:   No acute cardiopulmonary process. Narrative:  ONE VIEW CHEST RADIOGRAPH        INDICATION: chest pain, sob, and/or arrhythmia. COMPARISON: Chest radiograph 9/20/2020. TECHNIQUE: AP view of the chest       FINDINGS:       LINES/TUBES: None. MEDIASTINUM: Cardiac silhouette is mildly prominent, but this is accentuated by   hypoinflation, not significantly changed from prior. LUNGS: Lungs are mildly hypoinflated. No consolidation, pleural effusion, or   pneumothorax. BONES/OTHER: No acute osseous abnormality. Medical Decision Making   I am the first provider for this patient. I reviewed the vital signs, available nursing notes, past medical history, past surgical history, family history and social history. Vital Signs-Reviewed the patient's vital signs. EKG: Interpreted by myself.       Records Reviewed: Personally, on initial evaluation    MDM:   Patient presents with dyspnea, which is associated with. Exam significant for respiratory distress, hypertension, crackles at the bases, wheezes  DDX considered:COPD exacerbation, CHF, ACS, anxiety,   DDX thought to be less likely but also considered due to high risk condition: Anxiety, aortic dissection, PE, Boerhaave's, pericarditis, mediastinitis    Patient condition on initial evaluation: Moderately ill, but not far off of baseline    Plan:   Oxygen therapy per protocol   Close Observation  Cardiac monitoring  As per orders noted below:  Orders Placed This Encounter    EKG, 12 LEAD, INITIAL    EKG, 12 LEAD, INITIAL    albuterol-ipratropium (DUO-NEB) 2.5 MG-0.5 MG/3 ML    dexamethasone (DECADRON) 4 mg/mL Oral 10 mg    furosemide (LASIX) tablet 40 mg    clonazePAM (KlonoPIN) tablet 0.5 mg    cloNIDine HCL (CATAPRES) tablet 0.2 mg          ED Course:   Patient improving with DuoNeb, Decadron. Patient given Lasix for mild crackles. Patient is stable to improved and will be discharged into police custody. Patient condition at time of disposition: Stable  DISCHARGE NOTE:   Pt has been reexamined. Patient has no new complaints, changes, or physical findings. Care plan outlined and precautions discussed. Results were reviewed with the patient. All medications were reviewed with the patient; will d/c home with no changes to meds. All of pt's questions and concerns were addressed. Alarm symptoms and return precautions associated with chief complaint and evaluation were reviewed with the patient in detail. The patient demonstrated adequate understanding. Patient was instructed and agrees to follow up with PCP, as well as to return to the ED upon further deterioration. Patient is ready to go home.   The patient is happy with this plan    Follow-up Information     Follow up With Specialties Details Why [address] [facility] EMERGENCY DEPT Emergency Medicine  As needed, If symptoms worsen [address] [facility]  [phone]    Ioana Dailey, MELINDA Nurse Practitioner  As needed, If symptoms worsen [facility] [id number] [phone]            Current Discharge Medication List      CONTINUE these medications which have NOT CHANGED    Details   cloNIDine HCL (CATAPRES) 0.2 mg tablet Take 1 Tab by mouth two (2) times a day for 30 days. Qty: 30 Tab, Refills: 1      predniSONE (DELTASONE) 50 mg tablet Take 1 Tab by mouth daily for 3 days. Qty: 3 Tab, Refills: 0      albuterol sulfate 90 mcg/actuation aebs Take 2 Puffs by inhalation every four to six (4-6) hours as needed for Wheezing for up to 30 days. Qty: 1 Inhaler, Refills: 0      albuterol-ipratropium (DUO-NEB) 2.5 mg-0.5 mg/3 ml nebu 3 mL by Nebulization route every four (4) hours as needed for Wheezing, Shortness of Breath or Cough. Qty: 30 Nebule, Refills: 0      arformoteroL (BROVANA) 15 mcg/2 mL nebu neb solution 2 mL by Nebulization route two (2) times a day. Indications: bronchospasm prevention with COPD  Qty: 60 Vial, Refills: 0      budesonide (PULMICORT) 0.5 mg/2 mL nbsp 2 mL by Nebulization route two (2) times a day. Qty: 60 Each, Refills: 0      fluticasone furoate-vilanteroL (Breo Ellipta) 200-25 mcg/dose inhaler Take 1 Puff by inhalation daily. Qty: 1 Inhaler, Refills: 0      tiotropium (SPIRIVA) 18 mcg inhalation capsule Take 1 Cap by inhalation daily for 30 days. Qty: 30 Cap, Refills: 0      citalopram (CeleXA) 20 mg tablet 1 tablet      gemfibroziL (LOPID) 600 mg tablet 1 tablet      hydroCHLOROthiazide (HYDRODIURIL) 25 mg tablet 1 tablet in the morning      isosorbide mononitrate ER (IMDUR) 30 mg tablet Take 30 mg by mouth daily. lisinopriL (PRINIVIL, ZESTRIL) 20 mg tablet 1 tablet      sucralfate (CARAFATE) 100 mg/mL suspension Take 1,000 mg by mouth.       QUEtiapine (SEROquel) 50 mg tablet TAKE 1 TABLET BY MOUTH AT BEDTIME FOR MOOD      furosemide (Lasix) 20 mg tablet Take 1 Tab by mouth daily.  Qty: 5 Tab, Refills: 0      amLODIPine (NORVASC) 10 mg tablet Take 1 Tab by mouth daily. Qty: 15 Tab, Refills: 0      aspirin delayed-release 81 mg tablet Take 1 Tab by mouth daily. Qty: 30 Tab, Refills: 0      famotidine (PEPCID) 20 mg tablet Take 1 Tab by mouth daily. Qty: 30 Tab, Refills: 1      OXYGEN-AIR DELIVERY SYSTEMS 3 L by IntraNASal route as needed for Other (sob). docusate sodium (COLACE) 100 mg capsule Take 1 Cap by mouth two (2) times a day. Qty: 120 Cap, Refills: 0      atorvastatin (LIPITOR) 20 mg tablet Take 1 Tab by mouth nightly. Qty: 30 Tab, Refills: 0      metoprolol tartrate (LOPRESSOR) 25 mg tablet Take 0.5 Tabs by mouth every twelve (12) hours. Qty: 30 Tab, Refills: 0      roflumilast (DALIRESP) 500 mcg tab tablet Take 1 Tab by mouth daily. Qty: 30 Tab, Refills: 0      naloxone (NARCAN) 4 mg/actuation nasal spray Use 1 spray intranasally, then discard. Repeat with new spray every 2 min as needed for opioid overdose symptoms, alternating nostrils. Qty: 1 Each, Refills: 0             Procedures:  Procedures      Critical Care Time:       Diagnosis     Clinical Impression:   1. Acute exacerbation of chronic obstructive pulmonary disease (COPD) ([facility])        Signed,  Megan Mendez MD  Emergency Physician  [facility]    As a voice dictation software was utilized to dictate this note, minor word transpositions can occur. I apologize for confusing wording and typographic errors. Please feel free to contact me for clarification.

## 2020-10-01 NOTE — ED NOTES
Patient is refusing to wait in room for stretcher transport home. She is waiting in lobby with one of the hospital's oxygen tanks.

## 2020-10-01 NOTE — ED TRIAGE NOTES
Patient presents to the ED via EMS for evaluation of SOB and right leg pain. Patient states, \"I was at the grocery store when I slipped and landed on my right knee and now it hurts. I also am having some trouble breathing.  \"

## 2020-10-01 NOTE — ED NOTES
I have reviewed discharge instructions with the patient. The patient verbalized understanding. Patient left via police custody.

## 2020-10-01 NOTE — ED NOTES
Applied ace wrap to patient's right knee and educated patient on how to use crutches. Patient verbalized understanding and demonstrated using crutches.

## 2020-10-01 NOTE — ED NOTES
Provided patient with a cup of coffee. Patient is resting in bed with eyes closed, in stable condition and no signs of distress noted. Will continue to monitor.

## 2020-10-01 NOTE — DISCHARGE INSTRUCTIONS
Patient Education        Chronic Obstructive Pulmonary Disease (COPD): Care Instructions  Your Care Instructions     Chronic obstructive pulmonary disease (COPD) is a general term for a group of lung diseases, including emphysema and chronic bronchitis. People with COPD have decreased airflow in and out of the lungs, which makes it hard to breathe. The airways also can get clogged with thick mucus. Cigarette smoking is a major cause of COPD. Although there is no cure for COPD, you can slow its progress. Following your treatment plan and taking care of yourself can help you feel better and live longer. Follow-up care is a key part of your treatment and safety. Be sure to make and go to all appointments, and call your doctor if you are having problems. It's also a good idea to know your test results and keep a list of the medicines you take. How can you care for yourself at home? Staying healthy    · Do not smoke. This is the most important step you can take to prevent more damage to your lungs. If you need help quitting, talk to your doctor about stop-smoking programs and medicines. These can increase your chances of quitting for good.     · Avoid colds and flu. Get a pneumococcal vaccine shot. If you have had one before, ask your doctor whether you need a second dose. Get the flu vaccine every fall. If you must be around people with colds or the flu, wash your hands often.     · Avoid secondhand smoke, air pollution, and high altitudes. Also avoid cold, dry air and hot, humid air. Stay at home with your windows closed when air pollution is bad. Medicines and oxygen therapy    · Take your medicines exactly as prescribed. Call your doctor if you think you are having a problem with your medicine. You may be taking medicines such as:  ? Bronchodilators. These help open your airways and make breathing easier. They are either short-acting (work for 6 to 9 hours) or long-acting (work for 24 hours).  You inhale most bronchodilators, so they start to act quickly. Always carry your quick-relief inhaler with you in case you need it while you are away from home. ? Corticosteroids (prednisone, budesonide). These reduce airway inflammation. They come in pill or inhaled form. You must take these medicines every day for them to work well.     · Ask your doctor or pharmacist if a spacer is right for you. A spacer may help you get more inhaled medicine to your lungs. If you use one, ask how to use it properly.     · Do not take any vitamins, over-the-counter medicine, or herbal products without talking to your doctor first.     · If your doctor prescribed antibiotics, take them as directed. Do not stop taking them just because you feel better. You need to take the full course of antibiotics.     · If you use oxygen therapy, use the flow rate your doctor has recommended. Don't change it without talking to your doctor first. Oxygen therapy boosts the amount of oxygen in your blood and helps you breathe easier. Activity    · Get regular exercise. Walking is an easy way to get exercise. Start out slowly, and walk a little more each day.     · Pay attention to your breathing. You are exercising too hard if you can't talk while you exercise.     · Take short rest breaks when doing household chores and other activities.     · Learn breathing methods--such as breathing through pursed lips--to help you become less short of breath.     · If your doctor has not set you up with a pulmonary rehabilitation program, ask if rehab is right for you. Rehab includes exercise programs, education about your disease and how to manage it, help with diet and other changes, and emotional support. Diet    · Eat regular, healthy meals. Use bronchodilators about 1 hour before you eat to make it easier to eat. Eat several small meals instead of three large ones.  Drink beverages at the end of the meal. Avoid foods that are hard to chew.     · Eat foods that contain protein so you don't lose muscle mass.     · Talk with your doctor if you gain too much weight or if you lose weight without trying. Mental health    · Talk to your family, friends, or a therapist about your feelings. Some people feel frightened, angry, hopeless, helpless, and even guilty. Talking openly about bad feelings can help you cope. If these feelings last, talk to your doctor. When should you call for help? Call 911 anytime you think you may need emergency care. For example, call if:    · You have severe trouble breathing. Call your doctor now or seek immediate medical care if:    · You have new or worse trouble breathing.     · You cough up blood.     · You have a fever. Watch closely for changes in your health, and be sure to contact your doctor if:    · You cough more deeply or more often, especially if you notice more mucus or a change in the color of your mucus.     · You have new or worse swelling in your legs or belly.     · You are not getting better as expected. Where can you learn more? Go to http://www.gray.com/  Enter T269 in the search box to learn more about \"Chronic Obstructive Pulmonary Disease (COPD): Care Instructions. \"  Current as of: February 24, 2020               Content Version: 12.6  © 2006-2020 [employer]. Care instructions adapted under license by [employer] (which disclaims liability or warranty for this information). If you have questions about a medical condition or this instruction, always ask your healthcare professional. [place] [address] any warranty or liability for your use of this information.

## 2020-10-01 NOTE — ED PROVIDER NOTES
EMERGENCY DEPARTMENT HISTORY AND PHYSICAL EXAM      Date: 9/30/2020  Patient Name: June Jimenez    History of Presenting Illness     Chief Complaint   Patient presents with    Fall    Shortness of Breath       History (Context): June Jimenez is a 63 y.o. woman with COPD, heart failure, and a complicated set of active comorbid conditions as noted below in the past medical history, who presents with acute subonset, severe, constant dyspnea. This started a few hours ago. This started after she fell in the grocery store. The patient also complains of acute onset, severe, persistent, localized right knee pain without ability to bear weight without relieving features or other associated symptoms. On review of systems, the patient denies chest pain, back pain, leg swelling, leg pain, recent travel, fever, chills, trauma, back pain, abdominal pain, nausea, vomiting, diaphoresis, numbness, tingling, weakness. PCP: Joss Grewal NP    Current Outpatient Medications   Medication Sig Dispense Refill    cloNIDine HCL (CATAPRES) 0.2 mg tablet Take 1 Tab by mouth two (2) times a day for 30 days. 30 Tab 1    predniSONE (DELTASONE) 50 mg tablet Take 1 Tab by mouth daily for 3 days. 3 Tab 0    albuterol sulfate 90 mcg/actuation aebs Take 2 Puffs by inhalation every four to six (4-6) hours as needed for Wheezing for up to 30 days. 1 Inhaler 0    albuterol-ipratropium (DUO-NEB) 2.5 mg-0.5 mg/3 ml nebu 3 mL by Nebulization route every four (4) hours as needed for Wheezing, Shortness of Breath or Cough. 30 Nebule 0    arformoteroL (BROVANA) 15 mcg/2 mL nebu neb solution 2 mL by Nebulization route two (2) times a day. Indications: bronchospasm prevention with COPD 60 Vial 0    budesonide (PULMICORT) 0.5 mg/2 mL nbsp 2 mL by Nebulization route two (2) times a day. 60 Each 0    fluticasone furoate-vilanteroL (Breo Ellipta) 200-25 mcg/dose inhaler Take 1 Puff by inhalation daily.  1 Inhaler 0    tiotropium (SPIRIVA) 18 mcg inhalation capsule Take 1 Cap by inhalation daily for 30 days. 30 Cap 0    citalopram (CeleXA) 20 mg tablet 1 tablet      gemfibroziL (LOPID) 600 mg tablet 1 tablet      hydroCHLOROthiazide (HYDRODIURIL) 25 mg tablet 1 tablet in the morning      isosorbide mononitrate ER (IMDUR) 30 mg tablet Take 30 mg by mouth daily.  lisinopriL (PRINIVIL, ZESTRIL) 20 mg tablet 1 tablet      sucralfate (CARAFATE) 100 mg/mL suspension Take 1,000 mg by mouth.  QUEtiapine (SEROquel) 50 mg tablet TAKE 1 TABLET BY MOUTH AT BEDTIME FOR MOOD      furosemide (Lasix) 20 mg tablet Take 1 Tab by mouth daily. 5 Tab 0    amLODIPine (NORVASC) 10 mg tablet Take 1 Tab by mouth daily. 15 Tab 0    ALPRAZolam (Xanax) 0.5 mg tablet Take 1 Tab by mouth every eight (8) hours as needed for Anxiety. Max Daily Amount: 1.5 mg. 20 Tab 0    aspirin delayed-release 81 mg tablet Take 1 Tab by mouth daily. 30 Tab 0    famotidine (PEPCID) 20 mg tablet Take 1 Tab by mouth daily. 30 Tab 1    OXYGEN-AIR DELIVERY SYSTEMS 3 L by IntraNASal route as needed for Other (sob).  docusate sodium (COLACE) 100 mg capsule Take 1 Cap by mouth two (2) times a day. 120 Cap 0    atorvastatin (LIPITOR) 20 mg tablet Take 1 Tab by mouth nightly. 30 Tab 0    metoprolol tartrate (LOPRESSOR) 25 mg tablet Take 0.5 Tabs by mouth every twelve (12) hours. 30 Tab 0    roflumilast (DALIRESP) 500 mcg tab tablet Take 1 Tab by mouth daily. 30 Tab 0    naloxone (NARCAN) 4 mg/actuation nasal spray Use 1 spray intranasally, then discard. Repeat with new spray every 2 min as needed for opioid overdose symptoms, alternating nostrils. 1 Each 0    montelukast (SINGULAIR) 10 mg tablet Take 1 Tab by mouth nightly.  30 Tab 1       Past History     Past Medical History:  Past Medical History:   Diagnosis Date    CHF (congestive heart failure) (HCC)     Chronic respiratory failure with hypoxia ([facility] [address] 9/7/2020    CKD (chronic kidney disease) stage 3, GFR 30-59 ml/min ([address] 10/31/2019    Cocaine abuse ([address] 11/26/2017    COPD (chronic obstructive pulmonary disease) with chronic bronchitis ([address] 12/19/2017    Dependence on supplemental oxygen     Depression 3/22/2020    Drug-seeking behavior 11/19/2016    Endocrine disease     thyroid issues    Essential hypertension 3/10/2017    Fe deficiency anemia 10/27/2012    Fibromyalgia 12/16/2016    Gastroesophageal reflux disease without esophagitis 10/10/2016    Gastrointestinal disorder     \"blockage in my stomach\"    Hypercholesterolemia     Hypertension     Hypertensive heart failure ([address] 12/19/2017    Morbid obesity due to excess calories ([address] 3/10/2017    NSTEMI (non-ST elevated myocardial infarction) ([address] 3/22/2020    On home O2 12/3/2015    Overview:  2L at home per pt for approx 8 years    Polysubstance abuse ([address] 9/15/2018    Respiratory failure ([address] 1/11/2017    S/P hernia repair 10/31/2019    Sleep apnea 12/19/2017    T wave inversion in EKG 3/9/2019    Tobacco use 2/37/5188    Uncomplicated severe persistent asthma 12/13/2016    Vocal cord disease 12/7/2016       Past Surgical History:  Past Surgical History:   Procedure Laterality Date    HX GI      Exploratory laparotomy with lysis of adhesions and primary repair of incarcerated umbilical hernia       Family History:  No family history on file. Social History:  Social History     Tobacco Use    Smoking status: Current Every Day Smoker     Packs/day: 0.25    Smokeless tobacco: Current User   Substance Use Topics    Alcohol use: No     Comment: socially    Drug use: Not Currently     Types: Heroin       Allergies:  No Known Allergies    PMH, PSH, family history, social history, allergies reviewed with the patient with significant items noted above. Review of Systems   As per HPI, otherwise reviewed and negative.      Physical Exam     Vitals:    09/30/20 2045 09/30/20 2200 09/30/20 2215 09/30/20 2230   BP: (!) 125/47 (!) 117/57 135/60 135/77   Pulse:       Resp:       Temp:       SpO2: 100% 100% 100% 100%   Weight:       Height:           Gen: In mild respiratory distress  HEENT: Normocephalic, sclera anicteric  Cardiovascular: Normal rate, regular rhythm, no murmurs, rubs, gallops. Pulses intact and equal distally. Pulmonary: Mild respiratory distress. Tachypneic. No stridor. No wheezes throughout  ABD: Soft, nontender, nondistended. Neuro: Alert. Normal speech. Normal mentation. Psych: Normal thought content and thought processes. : No CVA tenderness  EXT: Anterior and posterior drawer test negative on right knee. Valgus and varus stresses on the knee do not reveal laxity. The patient does not have point tenderness over the fibular head or the patella. The patellar ligament appears to be normal on exam.  No pedal edema. Moves all extremities well. No cyanosis or clubbing. Skin: Warm and well-perfused. Diagnostic Study Results     Labs -   No results found for this or any previous visit (from the past 12 hour(s)). Radiologic Studies -   XR KNEE RT 3 V   Final Result   IMPRESSION:      Mild osteoarthritic change medial compartment. No acute fracture. CT Results  (Last 48 hours)    None        CXR Results  (Last 48 hours)               09/29/20 0905  XR CHEST PORT Final result    Impression:  IMPRESSION:   No acute cardiopulmonary process. Narrative:  ONE VIEW CHEST RADIOGRAPH        INDICATION: chest pain, sob, and/or arrhythmia. COMPARISON: Chest radiograph 9/20/2020. TECHNIQUE: AP view of the chest       FINDINGS:       LINES/TUBES: None. MEDIASTINUM: Cardiac silhouette is mildly prominent, but this is accentuated by   hypoinflation, not significantly changed from prior. LUNGS: Lungs are mildly hypoinflated. No consolidation, pleural effusion, or   pneumothorax. BONES/OTHER: No acute osseous abnormality.                        Medical Decision Making   I am the first provider for this patient. I reviewed the vital signs, available nursing notes, past medical history, past surgical history, family history and social history. Vital Signs-Reviewed the patient's vital signs. Records Reviewed: Personally, on initial evaluation    MDM:   Patient presents with dyspnea, which is associated with. Exam significant for wheezing. The patient is very well-known to me, and this seems to be typical of her presentations for COPD exacerbation. In terms of her knee, her name is stable. DDX considered: COPD exacerbation, fracture, dislocation, sprain, strain, malingering, secondary gain   DDX thought to be less likely but also considered due to high risk condition: Anxiety, aortic dissection, PE, Boerhaave's, pericarditis, mediastinitis    Patient condition on initial evaluation: Moderately ill    Plan:   Oxygen therapy per protocol  Close Observation  Cardiac monitoring  As per orders noted below:  Orders Placed This Encounter    XR KNEE RT 3 V    DURABLE MEDICAL EQUIPMENT     albuterol-ipratropium (DUO-NEB) 2.5 MG-0.5 MG/3 ML    dexamethasone (DECADRON) 4 mg/mL Oral 10 mg          ED Course:   ED Course as of Sep 30 2239   Wed Sep 30, 2020   2219 Radiograph negative. XR KNEE RT 3 V [DT]   2230 Repeat evaluation of the patient demonstrates the patient is significantly improved in terms of wheezing. Will be discharged home    [DT]      ED Course User Index  [DT] Jordana Arredondo MD         Patient condition at time of disposition: Stable  DISCHARGE NOTE:   Pt has been reexamined. Patient has no new complaints, changes, or physical findings. Care plan outlined and precautions discussed. Results were reviewed with the patient. All medications were reviewed with the patient; will d/c home with no changes to meds. All of pt's questions and concerns were addressed.   Alarm symptoms and return precautions associated with chief complaint and evaluation were reviewed with the patient in detail. The patient demonstrated adequate understanding. Patient was instructed and agrees to follow up with PCP, as well as to return to the ED upon further deterioration. Patient is ready to go home. The patient is happy with this plan    Follow-up Information     Follow up With Specialties Details Why [address]    [facility] Emergency Medicine  If symptoms worsen [address] [address] [facility]    Guillermo Peterson, NP Nurse Practitioner  As needed, If symptoms worsen [address] [facility]  [phone]            Current Discharge Medication List          Procedures:  Procedures      Critical Care Time:       Diagnosis     Clinical Impression:   1. Acute exacerbation of chronic obstructive pulmonary disease (COPD) ([facility] [address]    2. Sprain of right knee, unspecified ligament, initial encounter        Signed,  Lakeisha Bergeron MD  Emergency Physician  [facility]    As a voice dictation software was utilized to dictate this note, minor word transpositions can occur. I apologize for confusing wording and typographic errors. Please feel free to contact me for clarification.

## 2020-10-07 ENCOUNTER — APPOINTMENT (OUTPATIENT)
Dept: CT IMAGING | Age: 55
DRG: 469 | End: 2020-10-07
Attending: EMERGENCY MEDICINE
Payer: MEDICAID

## 2020-10-07 ENCOUNTER — HOSPITAL ENCOUNTER (INPATIENT)
Age: 55
LOS: 2 days | Discharge: LEFT BEFORE DISCHARGE | DRG: 469 | End: 2020-10-10
Attending: EMERGENCY MEDICINE | Admitting: INTERNAL MEDICINE
Payer: MEDICAID

## 2020-10-07 ENCOUNTER — APPOINTMENT (OUTPATIENT)
Dept: GENERAL RADIOLOGY | Age: 55
DRG: 469 | End: 2020-10-07
Attending: EMERGENCY MEDICINE
Payer: MEDICAID

## 2020-10-07 DIAGNOSIS — N39.0 URINARY TRACT INFECTION WITHOUT HEMATURIA, SITE UNSPECIFIED: ICD-10-CM

## 2020-10-07 DIAGNOSIS — R07.9 CHRONIC CHEST PAIN: ICD-10-CM

## 2020-10-07 DIAGNOSIS — G89.29 CHRONIC CHEST PAIN: ICD-10-CM

## 2020-10-07 DIAGNOSIS — N17.9 ACUTE KIDNEY INJURY (HCC): Primary | ICD-10-CM

## 2020-10-07 LAB
ALBUMIN SERPL-MCNC: 3.9 G/DL (ref 3.4–5)
ALBUMIN/GLOB SERPL: 1.2 {RATIO} (ref 0.8–1.7)
ALP SERPL-CCNC: 85 U/L (ref 45–117)
ALT SERPL-CCNC: 32 U/L (ref 13–56)
ANION GAP SERPL CALC-SCNC: 9 MMOL/L (ref 3–18)
AST SERPL-CCNC: 29 U/L (ref 10–38)
BASOPHILS # BLD: 0 K/UL (ref 0–0.1)
BASOPHILS NFR BLD: 0 % (ref 0–2)
BILIRUB SERPL-MCNC: 0.5 MG/DL (ref 0.2–1)
BUN SERPL-MCNC: 46 MG/DL (ref 7–18)
BUN/CREAT SERPL: 6 (ref 12–20)
CALCIUM SERPL-MCNC: 9 MG/DL (ref 8.5–10.1)
CHLORIDE SERPL-SCNC: 101 MMOL/L (ref 100–111)
CK MB CFR SERPL CALC: 3.9 % (ref 0–4)
CK MB SERPL-MCNC: 27.4 NG/ML (ref 5–25)
CK SERPL-CCNC: 696 U/L (ref 26–192)
CO2 SERPL-SCNC: 29 MMOL/L (ref 21–32)
CREAT SERPL-MCNC: 7.87 MG/DL (ref 0.6–1.3)
DIFFERENTIAL METHOD BLD: NORMAL
EOSINOPHIL # BLD: 0.1 K/UL (ref 0–0.4)
EOSINOPHIL NFR BLD: 1 % (ref 0–5)
ERYTHROCYTE [DISTWIDTH] IN BLOOD BY AUTOMATED COUNT: 13.6 % (ref 11.6–14.5)
GLOBULIN SER CALC-MCNC: 3.3 G/DL (ref 2–4)
GLUCOSE SERPL-MCNC: 104 MG/DL (ref 74–99)
HCT VFR BLD AUTO: 39.2 % (ref 35–45)
HGB BLD-MCNC: 12.8 G/DL (ref 12–16)
LYMPHOCYTES # BLD: 2.6 K/UL (ref 0.9–3.6)
LYMPHOCYTES NFR BLD: 23 % (ref 21–52)
MCH RBC QN AUTO: 30 PG (ref 24–34)
MCHC RBC AUTO-ENTMCNC: 32.7 G/DL (ref 31–37)
MCV RBC AUTO: 91.8 FL (ref 74–97)
MONOCYTES # BLD: 0.9 K/UL (ref 0.05–1.2)
MONOCYTES NFR BLD: 8 % (ref 3–10)
NEUTS SEG # BLD: 7.5 K/UL (ref 1.8–8)
NEUTS SEG NFR BLD: 68 % (ref 40–73)
PLATELET # BLD AUTO: 239 K/UL (ref 135–420)
PMV BLD AUTO: 10.8 FL (ref 9.2–11.8)
POTASSIUM SERPL-SCNC: 4.2 MMOL/L (ref 3.5–5.5)
PROT SERPL-MCNC: 7.2 G/DL (ref 6.4–8.2)
RBC # BLD AUTO: 4.27 M/UL (ref 4.2–5.3)
SODIUM SERPL-SCNC: 139 MMOL/L (ref 136–145)
TROPONIN I SERPL-MCNC: 0.03 NG/ML (ref 0–0.04)
WBC # BLD AUTO: 11.1 K/UL (ref 4.6–13.2)

## 2020-10-07 PROCEDURE — 74176 CT ABD & PELVIS W/O CONTRAST: CPT

## 2020-10-07 PROCEDURE — 80053 COMPREHEN METABOLIC PANEL: CPT

## 2020-10-07 PROCEDURE — 71045 X-RAY EXAM CHEST 1 VIEW: CPT

## 2020-10-07 PROCEDURE — 96361 HYDRATE IV INFUSION ADD-ON: CPT

## 2020-10-07 PROCEDURE — 99285 EMERGENCY DEPT VISIT HI MDM: CPT

## 2020-10-07 PROCEDURE — 82553 CREATINE MB FRACTION: CPT

## 2020-10-07 PROCEDURE — 85025 COMPLETE CBC W/AUTO DIFF WBC: CPT

## 2020-10-07 PROCEDURE — 93005 ELECTROCARDIOGRAM TRACING: CPT

## 2020-10-07 RX ORDER — KETOROLAC TROMETHAMINE 15 MG/ML
15 INJECTION, SOLUTION INTRAMUSCULAR; INTRAVENOUS
Status: DISCONTINUED | OUTPATIENT
Start: 2020-10-07 | End: 2020-10-07

## 2020-10-08 PROBLEM — R07.89 ATYPICAL CHEST PAIN: Status: ACTIVE | Noted: 2020-10-08

## 2020-10-08 PROBLEM — N17.9 AKI (ACUTE KIDNEY INJURY) (HCC): Status: ACTIVE | Noted: 2020-10-08

## 2020-10-08 PROBLEM — N39.0 UTI (URINARY TRACT INFECTION): Status: ACTIVE | Noted: 2020-10-08

## 2020-10-08 LAB
AMPHET UR QL SCN: NEGATIVE
ANION GAP SERPL CALC-SCNC: 8 MMOL/L (ref 3–18)
APPEARANCE UR: ABNORMAL
ATRIAL RATE: 81 BPM
BACTERIA URNS QL MICRO: ABNORMAL /HPF
BARBITURATES UR QL SCN: NEGATIVE
BENZODIAZ UR QL: POSITIVE
BILIRUB UR QL: NEGATIVE
BUN SERPL-MCNC: 47 MG/DL (ref 7–18)
BUN/CREAT SERPL: 7 (ref 12–20)
CALCIUM SERPL-MCNC: 8 MG/DL (ref 8.5–10.1)
CALCULATED P AXIS, ECG09: 68 DEGREES
CALCULATED R AXIS, ECG10: 28 DEGREES
CALCULATED T AXIS, ECG11: 3 DEGREES
CANNABINOIDS UR QL SCN: NEGATIVE
CHLORIDE SERPL-SCNC: 105 MMOL/L (ref 100–111)
CO2 SERPL-SCNC: 28 MMOL/L (ref 21–32)
COCAINE UR QL SCN: NEGATIVE
COLOR UR: ABNORMAL
CREAT SERPL-MCNC: 7.13 MG/DL (ref 0.6–1.3)
CREAT UR-MCNC: 291 MG/DL (ref 30–125)
DIAGNOSIS, 93000: NORMAL
EPITH CASTS URNS QL MICRO: ABNORMAL /LPF (ref 0–5)
GLUCOSE SERPL-MCNC: 133 MG/DL (ref 74–99)
GLUCOSE UR STRIP.AUTO-MCNC: NEGATIVE MG/DL
HDSCOM,HDSCOM: ABNORMAL
HGB UR QL STRIP: ABNORMAL
KETONES UR QL STRIP.AUTO: ABNORMAL MG/DL
LEUKOCYTE ESTERASE UR QL STRIP.AUTO: ABNORMAL
METHADONE UR QL: POSITIVE
NITRITE UR QL STRIP.AUTO: NEGATIVE
OPIATES UR QL: POSITIVE
P-R INTERVAL, ECG05: 132 MS
PCP UR QL: NEGATIVE
PH UR STRIP: 5 [PH] (ref 5–8)
POTASSIUM SERPL-SCNC: 3.8 MMOL/L (ref 3.5–5.5)
PROT UR STRIP-MCNC: 100 MG/DL
PROT UR-MCNC: 136 MG/DL
Q-T INTERVAL, ECG07: 410 MS
QRS DURATION, ECG06: 88 MS
QTC CALCULATION (BEZET), ECG08: 476 MS
RBC #/AREA URNS HPF: ABNORMAL /HPF (ref 0–5)
SODIUM SERPL-SCNC: 141 MMOL/L (ref 136–145)
SODIUM UR-SCNC: 42 MMOL/L (ref 20–110)
SP GR UR REFRACTOMETRY: 1.02 (ref 1–1.03)
URATE CRY URNS QL MICRO: ABNORMAL
UROBILINOGEN UR QL STRIP.AUTO: 1 EU/DL (ref 0.2–1)
VENTRICULAR RATE, ECG03: 81 BPM
WBC URNS QL MICRO: ABNORMAL /HPF (ref 0–5)

## 2020-10-08 PROCEDURE — 99223 1ST HOSP IP/OBS HIGH 75: CPT | Performed by: INTERNAL MEDICINE

## 2020-10-08 PROCEDURE — 82570 ASSAY OF URINE CREATININE: CPT

## 2020-10-08 PROCEDURE — 74011250636 HC RX REV CODE- 250/636: Performed by: INTERNAL MEDICINE

## 2020-10-08 PROCEDURE — 96360 HYDRATION IV INFUSION INIT: CPT

## 2020-10-08 PROCEDURE — 84300 ASSAY OF URINE SODIUM: CPT

## 2020-10-08 PROCEDURE — 81001 URINALYSIS AUTO W/SCOPE: CPT

## 2020-10-08 PROCEDURE — 74011250636 HC RX REV CODE- 250/636: Performed by: EMERGENCY MEDICINE

## 2020-10-08 PROCEDURE — 74011250637 HC RX REV CODE- 250/637: Performed by: INTERNAL MEDICINE

## 2020-10-08 PROCEDURE — 84156 ASSAY OF PROTEIN URINE: CPT

## 2020-10-08 PROCEDURE — 94761 N-INVAS EAR/PLS OXIMETRY MLT: CPT

## 2020-10-08 PROCEDURE — 94640 AIRWAY INHALATION TREATMENT: CPT

## 2020-10-08 PROCEDURE — 80048 BASIC METABOLIC PNL TOTAL CA: CPT

## 2020-10-08 PROCEDURE — 74011000250 HC RX REV CODE- 250: Performed by: INTERNAL MEDICINE

## 2020-10-08 PROCEDURE — 2709999900 HC NON-CHARGEABLE SUPPLY

## 2020-10-08 PROCEDURE — 87086 URINE CULTURE/COLONY COUNT: CPT

## 2020-10-08 PROCEDURE — 65270000029 HC RM PRIVATE

## 2020-10-08 PROCEDURE — 80307 DRUG TEST PRSMV CHEM ANLYZR: CPT

## 2020-10-08 RX ORDER — FAMOTIDINE 20 MG/1
20 TABLET, FILM COATED ORAL DAILY
Status: DISCONTINUED | OUTPATIENT
Start: 2020-10-08 | End: 2020-10-10 | Stop reason: HOSPADM

## 2020-10-08 RX ORDER — IPRATROPIUM BROMIDE 0.5 MG/2.5ML
0.5 SOLUTION RESPIRATORY (INHALATION)
Status: DISCONTINUED | OUTPATIENT
Start: 2020-10-08 | End: 2020-10-10 | Stop reason: HOSPADM

## 2020-10-08 RX ORDER — QUETIAPINE FUMARATE 25 MG/1
50 TABLET, FILM COATED ORAL 2 TIMES DAILY
Status: DISCONTINUED | OUTPATIENT
Start: 2020-10-08 | End: 2020-10-10 | Stop reason: HOSPADM

## 2020-10-08 RX ORDER — ARFORMOTEROL TARTRATE 15 UG/2ML
15 SOLUTION RESPIRATORY (INHALATION) 2 TIMES DAILY
Status: DISCONTINUED | OUTPATIENT
Start: 2020-10-08 | End: 2020-10-08

## 2020-10-08 RX ORDER — BUDESONIDE 0.5 MG/2ML
500 INHALANT ORAL
Status: DISCONTINUED | OUTPATIENT
Start: 2020-10-08 | End: 2020-10-10 | Stop reason: HOSPADM

## 2020-10-08 RX ORDER — ACETAMINOPHEN 325 MG/1
975 TABLET ORAL
Status: DISCONTINUED | OUTPATIENT
Start: 2020-10-08 | End: 2020-10-10 | Stop reason: HOSPADM

## 2020-10-08 RX ORDER — ONDANSETRON 2 MG/ML
4 INJECTION INTRAMUSCULAR; INTRAVENOUS
Status: DISCONTINUED | OUTPATIENT
Start: 2020-10-08 | End: 2020-10-10 | Stop reason: HOSPADM

## 2020-10-08 RX ORDER — SODIUM CHLORIDE 0.9 % (FLUSH) 0.9 %
5-40 SYRINGE (ML) INJECTION AS NEEDED
Status: DISCONTINUED | OUTPATIENT
Start: 2020-10-08 | End: 2020-10-10 | Stop reason: HOSPADM

## 2020-10-08 RX ORDER — IPRATROPIUM BROMIDE AND ALBUTEROL SULFATE 2.5; .5 MG/3ML; MG/3ML
3 SOLUTION RESPIRATORY (INHALATION)
Status: DISCONTINUED | OUTPATIENT
Start: 2020-10-08 | End: 2020-10-10 | Stop reason: HOSPADM

## 2020-10-08 RX ORDER — AMLODIPINE BESYLATE 10 MG/1
10 TABLET ORAL DAILY
Status: DISCONTINUED | OUTPATIENT
Start: 2020-10-08 | End: 2020-10-08

## 2020-10-08 RX ORDER — MONTELUKAST SODIUM 10 MG/1
10 TABLET ORAL
Status: DISCONTINUED | OUTPATIENT
Start: 2020-10-08 | End: 2020-10-10 | Stop reason: HOSPADM

## 2020-10-08 RX ORDER — POLYETHYLENE GLYCOL 3350 17 G/17G
17 POWDER, FOR SOLUTION ORAL DAILY PRN
Status: DISCONTINUED | OUTPATIENT
Start: 2020-10-08 | End: 2020-10-10 | Stop reason: HOSPADM

## 2020-10-08 RX ORDER — ALPRAZOLAM 0.25 MG/1
0.5 TABLET ORAL
Status: DISCONTINUED | OUTPATIENT
Start: 2020-10-08 | End: 2020-10-10 | Stop reason: HOSPADM

## 2020-10-08 RX ORDER — ASPIRIN 81 MG/1
81 TABLET ORAL DAILY
Status: DISCONTINUED | OUTPATIENT
Start: 2020-10-08 | End: 2020-10-10 | Stop reason: HOSPADM

## 2020-10-08 RX ORDER — SODIUM CHLORIDE 0.9 % (FLUSH) 0.9 %
5-40 SYRINGE (ML) INJECTION EVERY 8 HOURS
Status: DISCONTINUED | OUTPATIENT
Start: 2020-10-08 | End: 2020-10-10 | Stop reason: HOSPADM

## 2020-10-08 RX ORDER — SUCRALFATE 1 G/1
1 TABLET ORAL EVERY 6 HOURS
Status: DISCONTINUED | OUTPATIENT
Start: 2020-10-08 | End: 2020-10-10 | Stop reason: HOSPADM

## 2020-10-08 RX ORDER — ISOSORBIDE MONONITRATE 30 MG/1
30 TABLET, EXTENDED RELEASE ORAL DAILY
Status: DISCONTINUED | OUTPATIENT
Start: 2020-10-08 | End: 2020-10-10 | Stop reason: HOSPADM

## 2020-10-08 RX ORDER — DOCUSATE SODIUM 100 MG/1
100 CAPSULE, LIQUID FILLED ORAL 2 TIMES DAILY
Status: DISCONTINUED | OUTPATIENT
Start: 2020-10-08 | End: 2020-10-10 | Stop reason: HOSPADM

## 2020-10-08 RX ORDER — CITALOPRAM 20 MG/1
20 TABLET, FILM COATED ORAL DAILY
Status: DISCONTINUED | OUTPATIENT
Start: 2020-10-08 | End: 2020-10-10 | Stop reason: HOSPADM

## 2020-10-08 RX ORDER — ACETAMINOPHEN 325 MG/1
650 TABLET ORAL
Status: DISCONTINUED | OUTPATIENT
Start: 2020-10-08 | End: 2020-10-08 | Stop reason: SDUPTHER

## 2020-10-08 RX ORDER — PROMETHAZINE HYDROCHLORIDE 25 MG/1
12.5 TABLET ORAL
Status: DISCONTINUED | OUTPATIENT
Start: 2020-10-08 | End: 2020-10-10 | Stop reason: HOSPADM

## 2020-10-08 RX ORDER — HEPARIN SODIUM 5000 [USP'U]/ML
5000 INJECTION, SOLUTION INTRAVENOUS; SUBCUTANEOUS EVERY 8 HOURS
Status: DISCONTINUED | OUTPATIENT
Start: 2020-10-08 | End: 2020-10-10 | Stop reason: HOSPADM

## 2020-10-08 RX ORDER — CEFTRIAXONE 1 G/1
1 INJECTION, POWDER, FOR SOLUTION INTRAMUSCULAR; INTRAVENOUS
Status: DISCONTINUED | OUTPATIENT
Start: 2020-10-08 | End: 2020-10-08 | Stop reason: RX

## 2020-10-08 RX ORDER — GEMFIBROZIL 600 MG/1
600 TABLET, FILM COATED ORAL
Status: DISCONTINUED | OUTPATIENT
Start: 2020-10-08 | End: 2020-10-10 | Stop reason: HOSPADM

## 2020-10-08 RX ORDER — SODIUM CHLORIDE 9 MG/ML
75 INJECTION, SOLUTION INTRAVENOUS CONTINUOUS
Status: DISCONTINUED | OUTPATIENT
Start: 2020-10-08 | End: 2020-10-09

## 2020-10-08 RX ORDER — ALBUTEROL SULFATE 0.83 MG/ML
2.5 SOLUTION RESPIRATORY (INHALATION)
Status: DISCONTINUED | OUTPATIENT
Start: 2020-10-08 | End: 2020-10-08 | Stop reason: SDUPTHER

## 2020-10-08 RX ORDER — ATORVASTATIN CALCIUM 20 MG/1
20 TABLET, FILM COATED ORAL
Status: DISCONTINUED | OUTPATIENT
Start: 2020-10-08 | End: 2020-10-10 | Stop reason: HOSPADM

## 2020-10-08 RX ORDER — BUDESONIDE 0.5 MG/2ML
500 INHALANT ORAL 2 TIMES DAILY
Status: DISCONTINUED | OUTPATIENT
Start: 2020-10-08 | End: 2020-10-08

## 2020-10-08 RX ORDER — METOPROLOL TARTRATE 25 MG/1
12.5 TABLET, FILM COATED ORAL EVERY 12 HOURS
Status: DISCONTINUED | OUTPATIENT
Start: 2020-10-08 | End: 2020-10-10 | Stop reason: HOSPADM

## 2020-10-08 RX ORDER — ACETAMINOPHEN 650 MG/1
650 SUPPOSITORY RECTAL
Status: DISCONTINUED | OUTPATIENT
Start: 2020-10-08 | End: 2020-10-10 | Stop reason: HOSPADM

## 2020-10-08 RX ORDER — ARFORMOTEROL TARTRATE 15 UG/2ML
15 SOLUTION RESPIRATORY (INHALATION)
Status: DISCONTINUED | OUTPATIENT
Start: 2020-10-08 | End: 2020-10-10 | Stop reason: HOSPADM

## 2020-10-08 RX ORDER — CLONIDINE HYDROCHLORIDE 0.1 MG/1
0.2 TABLET ORAL 2 TIMES DAILY
Status: DISCONTINUED | OUTPATIENT
Start: 2020-10-08 | End: 2020-10-10 | Stop reason: HOSPADM

## 2020-10-08 RX ADMIN — ACETAMINOPHEN 975 MG: 325 TABLET ORAL at 18:15

## 2020-10-08 RX ADMIN — METOPROLOL TARTRATE 12.5 MG: 25 TABLET, FILM COATED ORAL at 09:03

## 2020-10-08 RX ADMIN — HEPARIN SODIUM 5000 UNITS: 5000 INJECTION INTRAVENOUS; SUBCUTANEOUS at 09:14

## 2020-10-08 RX ADMIN — HEPARIN SODIUM 5000 UNITS: 5000 INJECTION INTRAVENOUS; SUBCUTANEOUS at 18:21

## 2020-10-08 RX ADMIN — QUETIAPINE FUMARATE 50 MG: 25 TABLET ORAL at 18:19

## 2020-10-08 RX ADMIN — BUDESONIDE 500 MCG: 0.5 INHALANT RESPIRATORY (INHALATION) at 09:06

## 2020-10-08 RX ADMIN — CITALOPRAM HYDROBROMIDE 20 MG: 20 TABLET ORAL at 08:50

## 2020-10-08 RX ADMIN — ALPRAZOLAM 0.5 MG: 0.25 TABLET ORAL at 18:15

## 2020-10-08 RX ADMIN — ONDANSETRON 4 MG: 2 INJECTION INTRAMUSCULAR; INTRAVENOUS at 18:15

## 2020-10-08 RX ADMIN — DOCUSATE SODIUM 100 MG: 100 CAPSULE, LIQUID FILLED ORAL at 08:50

## 2020-10-08 RX ADMIN — SODIUM CHLORIDE 1000 ML: 900 INJECTION, SOLUTION INTRAVENOUS at 01:42

## 2020-10-08 RX ADMIN — QUETIAPINE FUMARATE 50 MG: 25 TABLET ORAL at 08:50

## 2020-10-08 RX ADMIN — SUCRALFATE 1 G: 1 TABLET ORAL at 08:50

## 2020-10-08 RX ADMIN — Medication 10 ML: at 18:23

## 2020-10-08 RX ADMIN — ISOSORBIDE MONONITRATE 30 MG: 30 TABLET, EXTENDED RELEASE ORAL at 08:50

## 2020-10-08 RX ADMIN — SUCRALFATE 1 G: 1 TABLET ORAL at 18:20

## 2020-10-08 RX ADMIN — DOCUSATE SODIUM 100 MG: 100 CAPSULE, LIQUID FILLED ORAL at 18:20

## 2020-10-08 RX ADMIN — FAMOTIDINE 20 MG: 20 TABLET ORAL at 08:50

## 2020-10-08 RX ADMIN — AMLODIPINE BESYLATE 10 MG: 10 TABLET ORAL at 09:02

## 2020-10-08 RX ADMIN — ARFORMOTEROL TARTRATE 15 MCG: 15 SOLUTION RESPIRATORY (INHALATION) at 20:50

## 2020-10-08 RX ADMIN — ASPIRIN 81 MG: 81 TABLET ORAL at 08:50

## 2020-10-08 RX ADMIN — GEMFIBROZIL 600 MG: 600 TABLET ORAL at 18:19

## 2020-10-08 RX ADMIN — CLONIDINE HYDROCHLORIDE 0.2 MG: 0.1 TABLET ORAL at 18:20

## 2020-10-08 RX ADMIN — ARFORMOTEROL TARTRATE 15 MCG: 15 SOLUTION RESPIRATORY (INHALATION) at 09:06

## 2020-10-08 RX ADMIN — CLONIDINE HYDROCHLORIDE 0.2 MG: 0.1 TABLET ORAL at 08:59

## 2020-10-08 RX ADMIN — BUDESONIDE 500 MCG: 0.5 INHALANT RESPIRATORY (INHALATION) at 20:50

## 2020-10-08 NOTE — ED NOTES
Pt fired previous RN and care taken over by this RN. Pt agitated and yelling at staff. Pt has been out of her room and frequently walks up to triage to smoke cigarettes. Pt required escort by security. Pt had also removed her IV in front of RN. Pt stated she wanted to go out side and smoke a cigarette and knew she could not leave with an IV in place.

## 2020-10-08 NOTE — ED NOTES
Patient refuses to let me place an IV and states, \"you are no longer my nurse, stay the fuck away from me. \"

## 2020-10-08 NOTE — ED NOTES
Pt removed her IV disconnected her IV fluids that were infusing. Pt was agitated with RN and unit secretary. Pt stated she felt that when she hit her call light her restroom privilege did not fast enough. Pt received the majority of her IV fluids prior to disconnect. No injury was found. Pt stated she had no pain. Pt was encouraged to remain in her room and that staff would check back more frequently. Pt had been up to void just before initiating the IV fluids. Pt pending admission. Repeat labs were completed and pt was updated on plan of care. Pt remains minimally cooperative at this time.

## 2020-10-08 NOTE — ED PROVIDER NOTES
Andriette Kussmaul is a 63 y.o. female with past medical history of hypertension, CHF, hyperlipidemia, COPD, and previous cocaine abuse coming in with acute on chronic chest pain as well as back pain and abdominal pain. Patient states that she has had chest pain for last few days. She states it comes on very frequently and is sharp. She states it comes and goes. She denies any associated shortness of breath, cough, fevers. Denies any leg swelling. Denies any history of DVT/PE. Patient also reports diffuse abdominal pain which is been present most of the day today. She denies any nausea, vomiting, or diarrhea. Patient states he had a bowel movement couple days ago and is still passing gas. She also complains of left upper back pain near her trapezius. She states that sharp. She denies any fall or trauma. She denies any numbness or weakness. Patient denies any lower back pain. Patient has no other complaints at this time.            Past Medical History:   Diagnosis Date    CHF (congestive heart failure) (HCC)     Chronic respiratory failure with hypoxia ([address] 9/7/2020    CKD (chronic kidney disease) stage 3, GFR 30-59 ml/min 10/31/2019    Cocaine abuse ([address] 11/26/2017    COPD (chronic obstructive pulmonary disease) with chronic bronchitis ([address] 12/19/2017    Dependence on supplemental oxygen     Depression 3/22/2020    Drug-seeking behavior 11/19/2016    Endocrine disease     thyroid issues    Essential hypertension 3/10/2017    Fe deficiency anemia 10/27/2012    Fibromyalgia 12/16/2016    Gastroesophageal reflux disease without esophagitis 10/10/2016    Gastrointestinal disorder     \"blockage in my stomach\"    Hypercholesterolemia     Hypertension     Hypertensive heart failure ([address] 12/19/2017    Morbid obesity due to excess calories ([address] 3/10/2017    NSTEMI (non-ST elevated myocardial infarction) ([address] 3/22/2020    On home O2 12/3/2015    Overview:  2L at home per pt for approx 8 years    Polysubstance abuse ([facility] 9/15/2018    Respiratory failure ([facility] 1/11/2017    S/P hernia repair 10/31/2019    Sleep apnea 12/19/2017    T wave inversion in EKG 3/9/2019    Tobacco use 5/76/5452    Uncomplicated severe persistent asthma 12/13/2016    Vocal cord disease 12/7/2016       Past Surgical History:   Procedure Laterality Date    HX GI      Exploratory laparotomy with lysis of adhesions and primary repair of incarcerated umbilical hernia         History reviewed. No pertinent family history.     Social History     Socioeconomic History    Marital status: SINGLE     Spouse name: Not on file    Number of children: Not on file    Years of education: Not on file    Highest education level: Not on file   Occupational History    Not on file   Social Needs    Financial resource strain: Not on file    Food insecurity     Worry: Not on file     Inability: Not on file    Transportation needs     Medical: Not on file     Non-medical: Not on file   Tobacco Use    Smoking status: Current Every Day Smoker     Packs/day: 0.25    Smokeless tobacco: Current User   Substance and Sexual Activity    Alcohol use: No     Comment: socially    Drug use: Not Currently     Types: Heroin    Sexual activity: Not Currently     Comment: last used 9 years ago   Lifestyle    Physical activity     Days per week: Not on file     Minutes per session: Not on file    Stress: Not on file   Relationships    Social connections     Talks on phone: Not on file     Gets together: Not on file     Attends Rastafarian service: Not on file     Active member of club or organization: Not on file     Attends meetings of clubs or organizations: Not on file     Relationship status: Not on file    Intimate partner violence     Fear of current or ex partner: Not on file     Emotionally abused: Not on file     Physically abused: Not on file     Forced sexual activity: Not on file   Other Topics Concern    Not on file   Social History Narrative    Not on file         ALLERGIES: Patient has no known allergies. Review of Systems   Constitutional: Negative. Negative for chills and fever. Respiratory: Negative. Negative for cough and shortness of breath. Cardiovascular: Positive for chest pain. Gastrointestinal: Positive for abdominal pain. Negative for diarrhea, nausea and vomiting. Musculoskeletal: Positive for back pain. Negative for myalgias. Skin: Negative. Negative for rash. Neurological: Negative. Negative for dizziness, weakness and light-headedness. All other systems reviewed and are negative. Vitals:    10/07/20 2214 10/08/20 0358   BP: (!) 96/56 100/69   Pulse: 85 79   Resp: 18 20   Temp: 97.8 °F (36.6 °C)    SpO2: 100% 100%   Weight: 72.6 kg (160 lb)             Physical Exam  Vitals signs reviewed. Constitutional:       General: She is not in acute distress. Appearance: Normal appearance. She is well-developed. Comments: Sitting up in bed drinking coffee and in no distress. HENT:      Head: Normocephalic and atraumatic. Eyes:      Extraocular Movements: Extraocular movements intact. Conjunctiva/sclera: Conjunctivae normal.      Pupils: Pupils are equal, round, and reactive to light. Neck:      Musculoskeletal: Normal range of motion and neck supple. Cardiovascular:      Rate and Rhythm: Normal rate and regular rhythm. Heart sounds: S1 normal and S2 normal. No murmur. No friction rub. No gallop. Comments: Reproducible tenderness to palpation over the left anterior chest wall. No crepitus or deformity. No bruising. Pulmonary:      Effort: Pulmonary effort is normal. No accessory muscle usage or respiratory distress. Breath sounds: Normal breath sounds. Abdominal:      General: There is no distension. Tenderness: There is no abdominal tenderness. Musculoskeletal: Normal range of motion. General: No tenderness. Skin:     General: Skin is warm. Findings: No rash. Neurological:      General: No focal deficit present. Mental Status: She is alert and oriented to person, place, and time. Psychiatric:         Speech: Speech normal.          MDM  Number of Diagnoses or Management Options  Acute kidney injury [facility]):   Chronic chest pain:   Urinary tract infection without hematuria, site unspecified:   Diagnosis management comments: Chelsy Mendez is a 63 y.o. female who is very well-known to this emergency Fide Dalton coming in with recurrent chest pain. Also complaining of back pain and abdominal pain today. She is however drinking a cup of coffee and abdomen is benign and nontender to palpation. There is no tachycardia, tachypnea, or hypoxia. No fever. No concern for sepsis, pneumonia, PE, or COVID-19. Suspect musculoskeletal pain. Will treat with NSAIDs and get work-up to rule out ACS. Troponin negative. Patient was found to have acute kidney injury with creatinine above 7. No other electrolyte abnormalities. CT abdomen shows no evidence of acute surgical emergency and no evidence of obstructive uropathy or post renal failure. Discussed with patient the results and need for admission and nephrology evaluation and she agrees with this plan.          Procedures      Vitals:  Patient Vitals for the past 12 hrs:   Temp Pulse Resp BP SpO2   10/08/20 0358  79 20 100/69 100 %   10/07/20 2214 97.8 °F (36.6 °C) 85 18 (!) 96/56 100 %       Medications ordered:   Medications   sodium chloride 0.9 % bolus infusion 1,000 mL (has no administration in time range)   cefTRIAXone (ROCEPHIN) 1 g in sterile water (preservative free) 10 mL IV syringe (has no administration in time range)   sodium chloride 0.9 % bolus infusion 1,000 mL (0 mL IntraVENous IV Completed 10/8/20 0358)         Lab findings:  Recent Results (from the past 12 hour(s))   CBC WITH AUTOMATED DIFF    Collection Time: 10/07/20 11:03 PM   Result Value Ref Range    WBC 11.1 4.6 - 13.2 K/uL    RBC 4.27 4.20 - 5.30 M/uL    HGB 12.8 12.0 - 16.0 g/dL    HCT 39.2 35.0 - 45.0 %    MCV 91.8 74.0 - 97.0 FL    MCH 30.0 24.0 - 34.0 PG    MCHC 32.7 31.0 - 37.0 g/dL    RDW 13.6 11.6 - 14.5 %    PLATELET 483 411 - 740 K/uL    MPV 10.8 9.2 - 11.8 FL    NEUTROPHILS 68 40 - 73 %    LYMPHOCYTES 23 21 - 52 %    MONOCYTES 8 3 - 10 %    EOSINOPHILS 1 0 - 5 %    BASOPHILS 0 0 - 2 %    ABS. NEUTROPHILS 7.5 1.8 - 8.0 K/UL    ABS. LYMPHOCYTES 2.6 0.9 - 3.6 K/UL    ABS. MONOCYTES 0.9 0.05 - 1.2 K/UL    ABS. EOSINOPHILS 0.1 0.0 - 0.4 K/UL    ABS. BASOPHILS 0.0 0.0 - 0.1 K/UL    DF AUTOMATED     CARDIAC PANEL,(CK, CKMB & TROPONIN)    Collection Time: 10/07/20 11:03 PM   Result Value Ref Range    CK - MB 27.4 (H) <3.6 ng/ml    CK-MB Index 3.9 0.0 - 4.0 %     (H) 26 - 192 U/L    Troponin-I, QT 0.03 0.0 - 3.989 NG/ML   METABOLIC PANEL, COMPREHENSIVE    Collection Time: 10/07/20 11:03 PM   Result Value Ref Range    Sodium 139 136 - 145 mmol/L    Potassium 4.2 3.5 - 5.5 mmol/L    Chloride 101 100 - 111 mmol/L    CO2 29 21 - 32 mmol/L    Anion gap 9 3.0 - 18 mmol/L    Glucose 104 (H) 74 - 99 mg/dL    BUN 46 (H) 7.0 - 18 MG/DL    Creatinine 7.87 (H) 0.6 - 1.3 MG/DL    BUN/Creatinine ratio 6 (L) 12 - 20      GFR est AA 6 (L) >60 ml/min/1.73m2    GFR est non-AA 5 (L) >60 ml/min/1.73m2    Calcium 9.0 8.5 - 10.1 MG/DL    Bilirubin, total 0.5 0.2 - 1.0 MG/DL    ALT (SGPT) 32 13 - 56 U/L    AST (SGOT) 29 10 - 38 U/L    Alk.  phosphatase 85 45 - 117 U/L    Protein, total 7.2 6.4 - 8.2 g/dL    Albumin 3.9 3.4 - 5.0 g/dL    Globulin 3.3 2.0 - 4.0 g/dL    A-G Ratio 1.2 0.8 - 1.7     URINALYSIS W/ RFLX MICROSCOPIC    Collection Time: 10/08/20  2:30 AM   Result Value Ref Range    Color DARK YELLOW      Appearance TURBID      Specific gravity 1.021 1.005 - 1.030      pH (UA) 5.0 5.0 - 8.0      Protein 100 (A) NEG mg/dL    Glucose Negative NEG mg/dL    Ketone TRACE (A) NEG mg/dL    Bilirubin Negative NEG      Blood MODERATE (A) NEG Urobilinogen 1.0 0.2 - 1.0 EU/dL    Nitrites Negative NEG      Leukocyte Esterase MODERATE (A) NEG     DRUG SCREEN, URINE    Collection Time: 10/08/20  2:30 AM   Result Value Ref Range    BENZODIAZEPINES Positive (A) NEG      BARBITURATES Negative NEG      THC (TH-CANNABINOL) Negative NEG      OPIATES Positive (A) NEG      PCP(PHENCYCLIDINE) Negative NEG      COCAINE Negative NEG      AMPHETAMINES Negative NEG      METHADONE Positive (A) NEG      HDSCOM (NOTE)    URINE MICROSCOPIC ONLY    Collection Time: 10/08/20  2:30 AM   Result Value Ref Range    WBC 10 to 15 0 - 5 /hpf    RBC 4 to 6 0 - 5 /hpf    Epithelial cells 4+ 0 - 5 /lpf    Bacteria 3+ (A) NEG /hpf    Uric acid crystals 2+ (A) NEG   METABOLIC PANEL, BASIC    Collection Time: 10/08/20  3:49 AM   Result Value Ref Range    Sodium 141 136 - 145 mmol/L    Potassium 3.8 3.5 - 5.5 mmol/L    Chloride 105 100 - 111 mmol/L    CO2 28 21 - 32 mmol/L    Anion gap 8 3.0 - 18 mmol/L    Glucose 133 (H) 74 - 99 mg/dL    BUN 47 (H) 7.0 - 18 MG/DL    Creatinine 7.13 (H) 0.6 - 1.3 MG/DL    BUN/Creatinine ratio 7 (L) 12 - 20      GFR est AA 7 (L) >60 ml/min/1.73m2    GFR est non-AA 6 (L) >60 ml/min/1.73m2    Calcium 8.0 (L) 8.5 - 10.1 MG/DL       EKG interpretation by ED Physician: Sinus rhythm rate of 81 bpm.  Patient has previously had a left bundle branch block. The QRS is wide enough, however the morphology is unchanged from her baseline. X-Ray, CT or other radiology findings or impressions:  CT ABD PELV WO CONT   Final Result   IMPRESSION:      1. Distended gallbladder with question of biliary sludge. 2.  Small umbilical hernia containing a very small portion of the transverse   colon. No bowel obstruction. 3.  Stable hepatic and left renal cysts. 4.  IUD in place.       XR CHEST SNGL V    (Results Pending)   No acute process    Progress notes, Consult notes or additional Procedure notes:     Consult: Spoke to Dr. Dee Crowe, hospitalist, regarding patient's symptoms, evidence of acute kidney injury, and history. He requested that we repeat the BMP, it was still elevated above 7 and he agreed to admit the patient to the medical floor for, it was still elevated above 7 and he agreed to admit the patient to the medical floor for nephrology evaluation and further work-up. Disposition:  Diagnosis:   1. Acute kidney injury ([address]    2. Urinary tract infection without hematuria, site unspecified    3. Chronic chest pain        Disposition: Admit    Follow-up Information    None          Patient's Medications   Start Taking    No medications on file   Continue Taking    ALBUTEROL SULFATE 90 MCG/ACTUATION AEBS    Take 2 Puffs by inhalation every four to six (4-6) hours as needed for Wheezing for up to 30 days. ALBUTEROL-IPRATROPIUM (DUO-NEB) 2.5 MG-0.5 MG/3 ML NEBU    3 mL by Nebulization route every four (4) hours as needed for Wheezing, Shortness of Breath or Cough. ALPRAZOLAM (XANAX) 0.5 MG TABLET    Take 1 Tab by mouth every eight (8) hours as needed for Anxiety. Max Daily Amount: 1.5 mg.    AMLODIPINE (NORVASC) 10 MG TABLET    Take 1 Tab by mouth daily. ARFORMOTEROL (BROVANA) 15 MCG/2 ML NEBU NEB SOLUTION    2 mL by Nebulization route two (2) times a day. Indications: bronchospasm prevention with COPD    ASPIRIN DELAYED-RELEASE 81 MG TABLET    Take 1 Tab by mouth daily. ATORVASTATIN (LIPITOR) 20 MG TABLET    Take 1 Tab by mouth nightly. BUDESONIDE (PULMICORT) 0.5 MG/2 ML NBSP    2 mL by Nebulization route two (2) times a day. CITALOPRAM (CELEXA) 20 MG TABLET    1 tablet    CLONIDINE HCL (CATAPRES) 0.2 MG TABLET    Take 1 Tab by mouth two (2) times a day for 30 days. DOCUSATE SODIUM (COLACE) 100 MG CAPSULE    Take 1 Cap by mouth two (2) times a day. FAMOTIDINE (PEPCID) 20 MG TABLET    Take 1 Tab by mouth daily. FLUTICASONE FUROATE-VILANTEROL (BREO ELLIPTA) 200-25 MCG/DOSE INHALER    Take 1 Puff by inhalation daily.     FUROSEMIDE (LASIX) 20 MG TABLET    Take 1 Tab by mouth daily. GEMFIBROZIL (LOPID) 600 MG TABLET    1 tablet    HYDROCHLOROTHIAZIDE (HYDRODIURIL) 25 MG TABLET    1 tablet in the morning    ISOSORBIDE MONONITRATE ER (IMDUR) 30 MG TABLET    Take 30 mg by mouth daily. LISINOPRIL (PRINIVIL, ZESTRIL) 20 MG TABLET    1 tablet    METOPROLOL TARTRATE (LOPRESSOR) 25 MG TABLET    Take 0.5 Tabs by mouth every twelve (12) hours. MONTELUKAST (SINGULAIR) 10 MG TABLET    Take 1 Tab by mouth nightly. NALOXONE (NARCAN) 4 MG/ACTUATION NASAL SPRAY    Use 1 spray intranasally, then discard. Repeat with new spray every 2 min as needed for opioid overdose symptoms, alternating nostrils. OXYGEN-AIR DELIVERY SYSTEMS    3 L by IntraNASal route as needed for Other (sob). QUETIAPINE (SEROQUEL) 50 MG TABLET    TAKE 1 TABLET BY MOUTH AT BEDTIME FOR MOOD    ROFLUMILAST (DALIRESP) 500 MCG TAB TABLET    Take 1 Tab by mouth daily. SUCRALFATE (CARAFATE) 100 MG/ML SUSPENSION    Take 1,000 mg by mouth. TIOTROPIUM (SPIRIVA) 18 MCG INHALATION CAPSULE    Take 1 Cap by inhalation daily for 30 days.    These Medications have changed    No medications on file   Stop Taking    No medications on file
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
## 2020-10-08 NOTE — PROGRESS NOTES
RENAL DAILY PROGRESS NOTE    Patient: Ilsa Bassett               Sex: female          DOA: 10/7/2020 10:18 PM        YOB: 1965      Age:  63 y.o.        LOS:  LOS: 0 days     Subjective:     Ilsa Bassett is a 63 y.o.  who presents with DEMARCUS (acute kidney injury) ([facility] [address] [N17.9]  Atypical chest pain [R07.89]  UTI (urinary tract infection) [N39.0]. Asked to evaluate for arf.admitted with chest/abd pain. denies vomiting ,diarrhea. hx of chf drug abuse,was on acei and lasix prior to admission. denies taking nsaids. urine screen pos for opiates,sl elevated cpk,?hx of hepatitis c  Chief complains: Patient denies nausea, vomiting  - Reviewed last 24 hrs events     Current Facility-Administered Medications   Medication Dose Route Frequency    sodium chloride 0.9 % bolus infusion 1,000 mL  1,000 mL IntraVENous ONCE    cefTRIAXone (ROCEPHIN) 1 g in sterile water (preservative free) 10 mL IV syringe  1 g IntraVENous NOW    montelukast (SINGULAIR) tablet 10 mg  10 mg Oral QHS    atorvastatin (LIPITOR) tablet 20 mg  20 mg Oral QHS    metoprolol tartrate (LOPRESSOR) tablet 12.5 mg  12.5 mg Oral Q12H    roflumilast (DALIRESP) tablet 500 mcg  500 mcg Oral DAILY    docusate sodium (COLACE) capsule 100 mg  100 mg Oral BID    aspirin delayed-release tablet 81 mg  81 mg Oral DAILY    famotidine (PEPCID) tablet 20 mg  20 mg Oral DAILY    ALPRAZolam (XANAX) tablet 0.5 mg  0.5 mg Oral Q8H PRN    amLODIPine (NORVASC) tablet 10 mg  10 mg Oral DAILY    citalopram (CELEXA) tablet 20 mg  20 mg Oral DAILY    gemfibroziL (LOPID) tablet 600 mg  600 mg Oral ACB&D    isosorbide mononitrate ER (IMDUR) tablet 30 mg  30 mg Oral DAILY    QUEtiapine (SEROquel) tablet 50 mg  50 mg Oral BID    albuterol-ipratropium (DUO-NEB) 2.5 MG-0.5 MG/3 ML  3 mL Nebulization Q4H PRN    cloNIDine HCL (CATAPRES) tablet 0.2 mg  0.2 mg Oral BID    sucralfate (CARAFATE) tablet 1 g  1 g Oral Q6H    ipratropium (ATROVENT) 0.02 % nebulizer solution 0.5 mg  0.5 mg Nebulization Q4H PRN    sodium chloride (NS) flush 5-40 mL  5-40 mL IntraVENous Q8H    sodium chloride (NS) flush 5-40 mL  5-40 mL IntraVENous PRN    acetaminophen (TYLENOL) suppository 650 mg  650 mg Rectal Q6H PRN    polyethylene glycol (MIRALAX) packet 17 g  17 g Oral DAILY PRN    promethazine (PHENERGAN) tablet 12.5 mg  12.5 mg Oral Q6H PRN    Or    ondansetron (ZOFRAN) injection 4 mg  4 mg IntraVENous Q6H PRN    heparin (porcine) injection 5,000 Units  5,000 Units SubCUTAneous Q8H    0.9% sodium chloride infusion  125 mL/hr IntraVENous CONTINUOUS    budesonide (PULMICORT) 500 mcg/2 ml nebulizer suspension  500 mcg Nebulization BID RT    arformoteroL (BROVANA) neb solution 15 mcg  15 mcg Nebulization BID RT    acetaminophen (TYLENOL) tablet 975 mg  975 mg Oral Q6H PRN       Objective:     Visit Vitals  BP (!) 83/50   Pulse 86   Temp 98.1 °F (36.7 °C)   Resp 20   Wt 72.6 kg (160 lb)   SpO2 97%   BMI 32.32 kg/m²       Intake/Output Summary (Last 24 hours) at 10/8/2020 1452  Last data filed at 10/8/2020 0358  Gross per 24 hour   Intake 1000 ml   Output    Net 1000 ml       Physical Examination:     GEN: Awake  RS: Chest is bilateral equal  CVS: S1-S2 heard  Abdomen: Soft, Non tender  Extremities: No edema, no cyanosis, skin is warm on touch      Data Review:      Labs:     Hematology:   Recent Labs     10/07/20  2303   WBC 11.1   HGB 12.8   HCT 39.2     Chemistry:   Recent Labs     10/08/20  0349 10/07/20  2303   BUN 47* 46*   CREA 7.13* 7.87*   CA 8.0* 9.0   ALB  --  3.9   K 3.8 4.2    139    101   CO2 28 29   * 104*        Images:    XR (Most Recent). CXR reviewed by me and compared with previous CXR Results from Hospital Encounter encounter on 10/07/20   XR CHEST SNGL V    Narrative EXAM: XR CHEST SNGL V    INDICATION: 63 years Female. chest pain. ADDITIONAL HISTORY: None.     TECHNIQUE: Frontal view of the chest.    COMPARISON: 9/29/2020    FINDINGS:    Reverse lordotic positioning. Mildly hypoinflated lungs. The patient is rotated  to the right. There is mild cephalization of pulmonary vasculature. The cardiac silhouette is within normal limits in size. No confluent consolidation is appreciated. No definite pleural effusion or pneumothorax. There is no evidence of acute osseous abnormality. Impression IMPRESSION:  Exam limitations as above. 1.  Mild pulmonary vascular congestion. 2.  Otherwise, no radiographic evidence of acute cardiopulmonary process. CT (Most Recent) Results from Hospital Encounter encounter on 10/07/20   CT ABD PELV WO CONT    Narrative CT abdomen and pelvis without contrast      HISTORY: Chest, abdominal and back pain. COMPARISON: CT 6/14/2020    TECHNIQUE: 5mm contiguous axial images from the lungs bases to the anal verge. No intravenous contrast was administered. All CT scans at this facility are performed using dose optimization technique as  appropriate to a performed exam, to include automated exposure control,  adjustment of the MA and/or kV according to patient size (including appropriate  matching for site-specific examinations) or use of  iterative reconstruction  technique. ABDOMEN FINDINGS: Imaged lung bases are clear except for a few tiny foci of  atelectasis. Stable cysts in the left hepatic lobe. Distended gallbladder with  subtle dependent hyperdense attenuation suggestive of biliary sludge. The  spleen, pancreas, adrenal glands and right kidney are unremarkable. Stable left  renal cyst. No renal calculi or hydronephrosis. No pneumoperitoneum or ascites. Small bowel anastomotic changes in the right abdomen. No evidence of bowel  obstruction. Normal appendix. Small umbilical hernia containing a very small  knuckle of the transverse colon. PELVIC FINDINGS:  Urinary bladder looks normal. IUD is in place. No mass,  adenopathy or free fluid. No dilated bowel.  Several vascular phleboliths. No  acute osseous abnormality. Impression IMPRESSION:    1. Distended gallbladder with question of biliary sludge. 2.  Small umbilical hernia containing a very small portion of the transverse  colon. No bowel obstruction. 3.  Stable hepatic and left renal cysts. 4.  IUD in place. EKG No results found for this or any previous visit.      I have personally reviewed the old medical records and patient's labs    Plan / Recommendation:      1.arf,related to hypotension,acei,lasix,drug abuse.hold bp meds and continue gentle iv hydration,considering hx of chf  2- has mild hematuria,proteinuria.if urine culture neg,will do further evaluation,check hepatitis panel,complements and cryoglobulins   3- ?uti/cholecystitis    D/w Dr. Kody Yo MD  Nephrology  10/8/2020

## 2020-10-08 NOTE — ED NOTES
Pt resting and otherwise stable at this time. Pt reported an epigastric/chest discomfort while eating snacks in her room. Pt is drinking coffee and eating pork rinds. Pt was encouraged to avoid caffeine due to chest discomfort but refused request. Pt continues to remove her blood pressure cuff, pulse ox and disconnect herself at will and walk around the department. Pt was encouraged to remain in her room and use call light if needing any assistance.

## 2020-10-08 NOTE — PROGRESS NOTES
Albuterol 2.5 mg nebulizer was therapeutically interchanged for albuterol 90 mcg inhaler per the P&T Committee approved Therapeutic Interchanges Policy.      Jessica Bolivar, [facility], Pharmacist  10/8/2020 7:04 AM

## 2020-10-08 NOTE — PROGRESS NOTES
saw her in ED but did not get a chance to pray. Stopped by her room and had prayer with her. LAM Black [facility]

## 2020-10-08 NOTE — PROGRESS NOTES
conducted an initial consultation and Spiritual Assessment for Thien Yang, who is a 63 y.o.,female. Patient's Primary Language is: [place]. According to the patient's EMR Catholic Affiliation is: Pascual Martinez.      The reason the Patient came to the hospital is:   Patient Active Problem List    Diagnosis Date Noted    UTI (urinary tract infection) 10/08/2020    Atypical chest pain 10/08/2020    DEMARCUS (acute kidney injury) ([address] 10/08/2020    Acute exacerbation of chronic obstructive pulmonary disease (COPD) ([address] 09/14/2020    COPD with acute exacerbation ([address] 09/07/2020    Chronic respiratory failure with hypoxia ([address] 09/07/2020    Acute bronchitis 09/07/2020    Suspected COVID-19 virus infection 09/07/2020    Tachycardia 09/07/2020    Normocytic anemia 09/07/2020    Acute respiratory distress 09/07/2020    COPD exacerbation ([address] 09/07/2020    Depression 03/22/2020    S/P hernia repair 10/31/2019    CKD (chronic kidney disease) stage 3, GFR 30-59 ml/min 10/31/2019    Tobacco use 09/28/2019    T wave inversion in EKG 03/09/2019    HLD (hyperlipidemia) 09/15/2018    Polysubstance abuse ([address] 09/15/2018    Hypertensive heart failure ([address] 12/19/2017    Sleep apnea 12/19/2017    COPD (chronic obstructive pulmonary disease) with chronic bronchitis ([address] 12/19/2017    Cocaine abuse ([address] 11/26/2017    Essential hypertension 03/10/2017    Morbid obesity due to excess calories ([address] 03/10/2017    Acute exacerbation of COPD with asthma ([address] 03/02/2017    Respiratory failure ([address] 01/11/2017    Fibromyalgia 86/77/7316    Uncomplicated severe persistent asthma 12/13/2016    Vocal cord disease 12/07/2016    Drug-seeking behavior 11/19/2016    Gastroesophageal reflux disease without esophagitis 10/10/2016    Thyroid goiter 06/30/2016    Asthma 03/05/2016    On home O2 12/03/2015    Abnormal CT of the chest 11/20/2015    MDRO (multiple drug resistant organisms) resistance 11/08/2012    Fe deficiency anemia 10/27/2012        The  provided the following Interventions:  Initiated a relationship of care and support. Explored issues of marilee, belief, spirituality and Bahai/ritual needs while hospitalized. Listened empathically. Provided chaplaincy education. Provided information about Spiritual Care Services. Offered prayer and assurance of continued prayers on patient's behalf. Chart reviewed. The following outcomes where achieved:  Patient shared limited information about both their medical narrative and spiritual journey/beliefs.  confirmed Patient's [employer] Affiliation. Patient processed feeling about current hospitalization. Patient expressed gratitude for 's visit. Assessment:  Patient does not have any Bahai/cultural needs that will affect patient's preferences in health care. There are no spiritual or Bahai issues which require intervention at this time. Plan:  Chaplains will continue to follow and will provide pastoral care on an as needed/requested basis.  recommends bedside caregivers page  on duty if patient shows signs of acute spiritual or emotional distress. Chaplain FRITZ  [address] [place] [address]   [phone]

## 2020-10-09 LAB
ANION GAP SERPL CALC-SCNC: 2 MMOL/L (ref 3–18)
BUN SERPL-MCNC: 30 MG/DL (ref 7–18)
BUN/CREAT SERPL: 14 (ref 12–20)
CALCIUM SERPL-MCNC: 8.4 MG/DL (ref 8.5–10.1)
CALCIUM SERPL-MCNC: 8.6 MG/DL (ref 8.5–10.1)
CHLORIDE SERPL-SCNC: 110 MMOL/L (ref 100–111)
CO2 SERPL-SCNC: 29 MMOL/L (ref 21–32)
CREAT SERPL-MCNC: 2.12 MG/DL (ref 0.6–1.3)
ERYTHROCYTE [DISTWIDTH] IN BLOOD BY AUTOMATED COUNT: 13.2 % (ref 11.6–14.5)
GLUCOSE SERPL-MCNC: 109 MG/DL (ref 74–99)
HBV CORE IGM SER QL: NEGATIVE
HBV SURFACE AG SER QL: <0.1 INDEX
HBV SURFACE AG SER QL: NEGATIVE
HCT VFR BLD AUTO: 35.9 % (ref 35–45)
HCV AB SER IA-ACNC: <0.02 INDEX
HCV AB SERPL QL IA: NEGATIVE
HCV COMMENT,HCGAC: NORMAL
HGB BLD-MCNC: 11.5 G/DL (ref 12–16)
MAGNESIUM SERPL-MCNC: 2.2 MG/DL (ref 1.6–2.6)
MCH RBC QN AUTO: 29.4 PG (ref 24–34)
MCHC RBC AUTO-ENTMCNC: 32 G/DL (ref 31–37)
MCV RBC AUTO: 91.8 FL (ref 74–97)
PHOSPHATE SERPL-MCNC: 2.7 MG/DL (ref 2.5–4.9)
PLATELET # BLD AUTO: 192 K/UL (ref 135–420)
PMV BLD AUTO: 10.8 FL (ref 9.2–11.8)
POTASSIUM SERPL-SCNC: 4.4 MMOL/L (ref 3.5–5.5)
PTH-INTACT SERPL-MCNC: 130.9 PG/ML (ref 18.4–88)
RBC # BLD AUTO: 3.91 M/UL (ref 4.2–5.3)
SODIUM SERPL-SCNC: 141 MMOL/L (ref 136–145)
WBC # BLD AUTO: 8.6 K/UL (ref 4.6–13.2)

## 2020-10-09 PROCEDURE — 83516 IMMUNOASSAY NONANTIBODY: CPT

## 2020-10-09 PROCEDURE — 83970 ASSAY OF PARATHORMONE: CPT

## 2020-10-09 PROCEDURE — 83735 ASSAY OF MAGNESIUM: CPT

## 2020-10-09 PROCEDURE — 86160 COMPLEMENT ANTIGEN: CPT

## 2020-10-09 PROCEDURE — 86038 ANTINUCLEAR ANTIBODIES: CPT

## 2020-10-09 PROCEDURE — 74011000250 HC RX REV CODE- 250: Performed by: INTERNAL MEDICINE

## 2020-10-09 PROCEDURE — 74011250637 HC RX REV CODE- 250/637: Performed by: INTERNAL MEDICINE

## 2020-10-09 PROCEDURE — 84100 ASSAY OF PHOSPHORUS: CPT

## 2020-10-09 PROCEDURE — 36415 COLL VENOUS BLD VENIPUNCTURE: CPT

## 2020-10-09 PROCEDURE — 86705 HEP B CORE ANTIBODY IGM: CPT

## 2020-10-09 PROCEDURE — 74011250636 HC RX REV CODE- 250/636: Performed by: INTERNAL MEDICINE

## 2020-10-09 PROCEDURE — 85027 COMPLETE CBC AUTOMATED: CPT

## 2020-10-09 PROCEDURE — 80048 BASIC METABOLIC PNL TOTAL CA: CPT

## 2020-10-09 PROCEDURE — 86803 HEPATITIS C AB TEST: CPT

## 2020-10-09 PROCEDURE — 87340 HEPATITIS B SURFACE AG IA: CPT

## 2020-10-09 PROCEDURE — 65270000029 HC RM PRIVATE

## 2020-10-09 PROCEDURE — 2709999900 HC NON-CHARGEABLE SUPPLY

## 2020-10-09 PROCEDURE — 94640 AIRWAY INHALATION TREATMENT: CPT

## 2020-10-09 PROCEDURE — 94762 N-INVAS EAR/PLS OXIMTRY CONT: CPT

## 2020-10-09 PROCEDURE — 83520 IMMUNOASSAY QUANT NOS NONAB: CPT

## 2020-10-09 RX ORDER — CEPHALEXIN 250 MG/1
250 CAPSULE ORAL 2 TIMES DAILY
Qty: 14 CAP | Refills: 0 | Status: SHIPPED | OUTPATIENT
Start: 2020-10-09 | End: 2020-10-16

## 2020-10-09 RX ADMIN — BUDESONIDE 500 MCG: 0.5 INHALANT RESPIRATORY (INHALATION) at 07:15

## 2020-10-09 RX ADMIN — CEFTRIAXONE SODIUM 1 G: 1 INJECTION, POWDER, FOR SOLUTION INTRAMUSCULAR; INTRAVENOUS at 18:27

## 2020-10-09 RX ADMIN — METOPROLOL TARTRATE 12.5 MG: 25 TABLET, FILM COATED ORAL at 22:58

## 2020-10-09 RX ADMIN — CLONIDINE HYDROCHLORIDE 0.2 MG: 0.1 TABLET ORAL at 10:03

## 2020-10-09 RX ADMIN — DOCUSATE SODIUM 100 MG: 100 CAPSULE, LIQUID FILLED ORAL at 10:04

## 2020-10-09 RX ADMIN — GEMFIBROZIL 600 MG: 600 TABLET ORAL at 10:03

## 2020-10-09 RX ADMIN — SUCRALFATE 1 G: 1 TABLET ORAL at 13:51

## 2020-10-09 RX ADMIN — SUCRALFATE 1 G: 1 TABLET ORAL at 10:03

## 2020-10-09 RX ADMIN — METOPROLOL TARTRATE 12.5 MG: 25 TABLET, FILM COATED ORAL at 10:04

## 2020-10-09 RX ADMIN — FAMOTIDINE 20 MG: 20 TABLET ORAL at 10:02

## 2020-10-09 RX ADMIN — Medication 10 ML: at 13:55

## 2020-10-09 RX ADMIN — ALPRAZOLAM 0.5 MG: 0.25 TABLET ORAL at 10:13

## 2020-10-09 RX ADMIN — ACETAMINOPHEN 975 MG: 325 TABLET ORAL at 00:14

## 2020-10-09 RX ADMIN — ROFLUMILAST 500 MCG: 500 TABLET ORAL at 10:04

## 2020-10-09 RX ADMIN — SUCRALFATE 1 G: 1 TABLET ORAL at 14:10

## 2020-10-09 RX ADMIN — QUETIAPINE FUMARATE 50 MG: 25 TABLET ORAL at 10:04

## 2020-10-09 RX ADMIN — ATORVASTATIN CALCIUM 20 MG: 20 TABLET, FILM COATED ORAL at 22:58

## 2020-10-09 RX ADMIN — CITALOPRAM HYDROBROMIDE 20 MG: 20 TABLET ORAL at 10:04

## 2020-10-09 RX ADMIN — MONTELUKAST 10 MG: 10 TABLET, FILM COATED ORAL at 22:58

## 2020-10-09 RX ADMIN — ARFORMOTEROL TARTRATE 15 MCG: 15 SOLUTION RESPIRATORY (INHALATION) at 07:15

## 2020-10-09 RX ADMIN — SODIUM CHLORIDE 75 ML/HR: 900 INJECTION, SOLUTION INTRAVENOUS at 15:37

## 2020-10-09 RX ADMIN — ASPIRIN 81 MG: 81 TABLET ORAL at 10:03

## 2020-10-09 RX ADMIN — Medication 10 ML: at 09:52

## 2020-10-09 RX ADMIN — ISOSORBIDE MONONITRATE 30 MG: 30 TABLET, EXTENDED RELEASE ORAL at 10:03

## 2020-10-09 RX ADMIN — Medication 10 ML: at 22:59

## 2020-10-09 NOTE — PROGRESS NOTES
Pt demanding and hostile, pt stated \" I need to be cleaned up now, someone stole my cell phone and black coat\". Pt appears to be upset, yelling and using profanity, demanding a warm blanket, I changed pt's linen and extra blankets were given, pt's cell phone and black jacket was located in the room,pt appears to be calmer now.

## 2020-10-09 NOTE — PROGRESS NOTES
Pt has not been cooperative  with questions about her meds that she takes at home, she is drowsy and refuses to answer wanting to be left alone to sleep.

## 2020-10-09 NOTE — PROGRESS NOTES
RENAL DAILY PROGRESS NOTE    Patient: Renita Hill               Sex: female          DOA: 10/7/2020 10:18 PM        YOB: 1965      Age:  63 y.o.        LOS:  LOS: 1 day     Subjective:     Renita Hill is a 63 y.o.  who presents with DEMARCUS (acute kidney injury) ([facility] [address] [N17.9]  Atypical chest pain [R07.89]  UTI (urinary tract infection) [N39.0]. Asked to evaluate for arf.admitted with chest/abd pain. denies vomiting ,diarrhea. hx of chf drug abuse,was on acei and lasix prior to admission. denies taking nsaids. urine screen pos for opiates,sl elevated cpk,?hx of hepatitis c  Chief complains: Patient denies nausea, vomiting  - Reviewed last 24 hrs events     Current Facility-Administered Medications   Medication Dose Route Frequency    montelukast (SINGULAIR) tablet 10 mg  10 mg Oral QHS    atorvastatin (LIPITOR) tablet 20 mg  20 mg Oral QHS    metoprolol tartrate (LOPRESSOR) tablet 12.5 mg  12.5 mg Oral Q12H    roflumilast (DALIRESP) tablet 500 mcg  500 mcg Oral DAILY    docusate sodium (COLACE) capsule 100 mg  100 mg Oral BID    aspirin delayed-release tablet 81 mg  81 mg Oral DAILY    famotidine (PEPCID) tablet 20 mg  20 mg Oral DAILY    ALPRAZolam (XANAX) tablet 0.5 mg  0.5 mg Oral Q8H PRN    citalopram (CELEXA) tablet 20 mg  20 mg Oral DAILY    gemfibroziL (LOPID) tablet 600 mg  600 mg Oral ACB&D    isosorbide mononitrate ER (IMDUR) tablet 30 mg  30 mg Oral DAILY    QUEtiapine (SEROquel) tablet 50 mg  50 mg Oral BID    albuterol-ipratropium (DUO-NEB) 2.5 MG-0.5 MG/3 ML  3 mL Nebulization Q4H PRN    cloNIDine HCL (CATAPRES) tablet 0.2 mg  0.2 mg Oral BID    sucralfate (CARAFATE) tablet 1 g  1 g Oral Q6H    ipratropium (ATROVENT) 0.02 % nebulizer solution 0.5 mg  0.5 mg Nebulization Q4H PRN    sodium chloride (NS) flush 5-40 mL  5-40 mL IntraVENous Q8H    sodium chloride (NS) flush 5-40 mL  5-40 mL IntraVENous PRN    acetaminophen (TYLENOL) suppository 650 mg 650 mg Rectal Q6H PRN    polyethylene glycol (MIRALAX) packet 17 g  17 g Oral DAILY PRN    promethazine (PHENERGAN) tablet 12.5 mg  12.5 mg Oral Q6H PRN    Or    ondansetron (ZOFRAN) injection 4 mg  4 mg IntraVENous Q6H PRN    heparin (porcine) injection 5,000 Units  5,000 Units SubCUTAneous Q8H    0.9% sodium chloride infusion  75 mL/hr IntraVENous CONTINUOUS    budesonide (PULMICORT) 500 mcg/2 ml nebulizer suspension  500 mcg Nebulization BID RT    arformoteroL (BROVANA) neb solution 15 mcg  15 mcg Nebulization BID RT    acetaminophen (TYLENOL) tablet 975 mg  975 mg Oral Q6H PRN       Objective:     Visit Vitals  BP (!) 86/44 (BP 1 Location: Right arm, BP Patient Position: Lying left side)   Pulse 72   Temp 99.4 °F (37.4 °C)   Resp 16   Wt 72.6 kg (160 lb)   SpO2 94%   BMI 32.32 kg/m²       Intake/Output Summary (Last 24 hours) at 10/9/2020 1413  Last data filed at 10/9/2020 1152  Gross per 24 hour   Intake 120 ml   Output 1050 ml   Net -930 ml       Physical Examination:     GEN: Awake  RS: Chest is bilateral equal  CVS: S1-S2 heard  Abdomen: Soft, Non tender  Extremities: No edema, no cyanosis, skin is warm on touch      Data Review:      Labs:     Hematology:   Recent Labs     10/09/20  0448 10/07/20  2303   WBC 8.6 11.1   HGB 11.5* 12.8   HCT 35.9 39.2     Chemistry:   Recent Labs     10/09/20  0448 10/08/20  0349 10/07/20  2303   BUN 30* 47* 46*   CREA 2.12* 7.13* 7.87*   CA 8.4*  8.6 8.0* 9.0   ALB  --   --  3.9   K 4.4 3.8 4.2    141 139    105 101   CO2 29 28 29   PHOS 2.7  --   --    * 133* 104*        Images:    XR (Most Recent). CXR reviewed by me and compared with previous CXR Results from Hospital Encounter encounter on 10/07/20   XR CHEST SNGL V    Narrative EXAM: XR CHEST SNGL V    INDICATION: 63 years Female. chest pain. ADDITIONAL HISTORY: None. TECHNIQUE: Frontal view of the chest.    COMPARISON: 9/29/2020    FINDINGS:    Reverse lordotic positioning.  Mildly hypoinflated lungs. The patient is rotated  to the right. There is mild cephalization of pulmonary vasculature. The cardiac silhouette is within normal limits in size. No confluent consolidation is appreciated. No definite pleural effusion or pneumothorax. There is no evidence of acute osseous abnormality. Impression IMPRESSION:  Exam limitations as above. 1.  Mild pulmonary vascular congestion. 2.  Otherwise, no radiographic evidence of acute cardiopulmonary process. CT (Most Recent) Results from Hospital Encounter encounter on 10/07/20   CT ABD PELV WO CONT    Narrative CT abdomen and pelvis without contrast      HISTORY: Chest, abdominal and back pain. COMPARISON: CT 6/14/2020    TECHNIQUE: 5mm contiguous axial images from the lungs bases to the anal verge. No intravenous contrast was administered. All CT scans at this facility are performed using dose optimization technique as  appropriate to a performed exam, to include automated exposure control,  adjustment of the MA and/or kV according to patient size (including appropriate  matching for site-specific examinations) or use of  iterative reconstruction  technique. ABDOMEN FINDINGS: Imaged lung bases are clear except for a few tiny foci of  atelectasis. Stable cysts in the left hepatic lobe. Distended gallbladder with  subtle dependent hyperdense attenuation suggestive of biliary sludge. The  spleen, pancreas, adrenal glands and right kidney are unremarkable. Stable left  renal cyst. No renal calculi or hydronephrosis. No pneumoperitoneum or ascites. Small bowel anastomotic changes in the right abdomen. No evidence of bowel  obstruction. Normal appendix. Small umbilical hernia containing a very small  knuckle of the transverse colon. PELVIC FINDINGS:  Urinary bladder looks normal. IUD is in place. No mass,  adenopathy or free fluid. No dilated bowel. Several vascular phleboliths. No  acute osseous abnormality. Impression IMPRESSION:    1. Distended gallbladder with question of biliary sludge. 2.  Small umbilical hernia containing a very small portion of the transverse  colon. No bowel obstruction. 3.  Stable hepatic and left renal cysts. 4.  IUD in place. EKG No results found for this or any previous visit. I have personally reviewed the old medical records and patient's labs    Plan / Recommendation:      1.arf,related to hypotension,acei,lasix,drug abuse. renal function is improving . continue gentle iv hydration,considering hx of chf  2- has mild hematuria,proteinuria.,check hepatitis panel,complements and cryoglobulins   3- ?uti/cholecystitis    D/w Dr. Sandeep Polo MD  Nephrology  10/9/2020

## 2020-10-09 NOTE — PROGRESS NOTES
Pt refused night meds and refused night assessment, pt stated\" leave me alone , I don't want to be bothered. \" pt is alert and has no distress.

## 2020-10-09 NOTE — PROGRESS NOTES
Patient refused to give list of home medications. Pt not cooperative and very demanding and cursing nurse.

## 2020-10-10 ENCOUNTER — HOSPITAL ENCOUNTER (EMERGENCY)
Age: 55
Discharge: HOME | End: 2020-10-10
Attending: EMERGENCY MEDICINE
Payer: MEDICAID

## 2020-10-10 ENCOUNTER — APPOINTMENT (OUTPATIENT)
Dept: GENERAL RADIOLOGY | Age: 55
End: 2020-10-10
Attending: NURSE PRACTITIONER
Payer: MEDICAID

## 2020-10-10 VITALS
BODY MASS INDEX: 32.32 KG/M2 | OXYGEN SATURATION: 99 % | HEART RATE: 73 BPM | WEIGHT: 160 LBS | DIASTOLIC BLOOD PRESSURE: 80 MMHG | TEMPERATURE: 97.6 F | RESPIRATION RATE: 18 BRPM | SYSTOLIC BLOOD PRESSURE: 169 MMHG

## 2020-10-10 VITALS
DIASTOLIC BLOOD PRESSURE: 90 MMHG | WEIGHT: 156 LBS | HEART RATE: 96 BPM | OXYGEN SATURATION: 99 % | SYSTOLIC BLOOD PRESSURE: 162 MMHG | RESPIRATION RATE: 14 BRPM | TEMPERATURE: 98.3 F | BODY MASS INDEX: 31.51 KG/M2

## 2020-10-10 DIAGNOSIS — M79.18 MUSCULOSKELETAL PAIN: Primary | ICD-10-CM

## 2020-10-10 LAB
ALBUMIN SERPL-MCNC: 3.6 G/DL (ref 3.4–5)
ALBUMIN/GLOB SERPL: 0.9 {RATIO} (ref 0.8–1.7)
ALP SERPL-CCNC: 89 U/L (ref 45–117)
ALT SERPL-CCNC: 27 U/L (ref 13–56)
ANION GAP SERPL CALC-SCNC: 3 MMOL/L (ref 3–18)
AST SERPL-CCNC: 21 U/L (ref 10–38)
BACTERIA SPEC CULT: NORMAL
BASOPHILS # BLD: 0 K/UL (ref 0–0.1)
BASOPHILS NFR BLD: 0 % (ref 0–2)
BILIRUB SERPL-MCNC: 0.2 MG/DL (ref 0.2–1)
BUN SERPL-MCNC: 19 MG/DL (ref 7–18)
BUN/CREAT SERPL: 16 (ref 12–20)
C3 SERPL-MCNC: 138 MG/DL (ref 82–167)
C4 SERPL-MCNC: 22 MG/DL (ref 14–44)
CALCIUM SERPL-MCNC: 9 MG/DL (ref 8.5–10.1)
CHLORIDE SERPL-SCNC: 107 MMOL/L (ref 100–111)
CK MB CFR SERPL CALC: 0.7 % (ref 0–4)
CK MB SERPL-MCNC: 1.4 NG/ML (ref 5–25)
CK SERPL-CCNC: 207 U/L (ref 26–192)
CO2 SERPL-SCNC: 31 MMOL/L (ref 21–32)
CREAT SERPL-MCNC: 1.17 MG/DL (ref 0.6–1.3)
DIFFERENTIAL METHOD BLD: NORMAL
EOSINOPHIL # BLD: 0.1 K/UL (ref 0–0.4)
EOSINOPHIL NFR BLD: 1 % (ref 0–5)
ERYTHROCYTE [DISTWIDTH] IN BLOOD BY AUTOMATED COUNT: 13 % (ref 11.6–14.5)
GLOBULIN SER CALC-MCNC: 3.9 G/DL (ref 2–4)
GLUCOSE SERPL-MCNC: 109 MG/DL (ref 74–99)
HCT VFR BLD AUTO: 38.5 % (ref 35–45)
HGB BLD-MCNC: 12.9 G/DL (ref 12–16)
INR PPP: 1.1 (ref 0.8–1.2)
LYMPHOCYTES # BLD: 2.3 K/UL (ref 0.9–3.6)
LYMPHOCYTES NFR BLD: 26 % (ref 21–52)
MAGNESIUM SERPL-MCNC: 1.9 MG/DL (ref 1.6–2.6)
MCH RBC QN AUTO: 30.4 PG (ref 24–34)
MCHC RBC AUTO-ENTMCNC: 33.5 G/DL (ref 31–37)
MCV RBC AUTO: 90.8 FL (ref 74–97)
MONOCYTES # BLD: 0.6 K/UL (ref 0.05–1.2)
MONOCYTES NFR BLD: 7 % (ref 3–10)
NEUTS SEG # BLD: 6 K/UL (ref 1.8–8)
NEUTS SEG NFR BLD: 66 % (ref 40–73)
PLATELET # BLD AUTO: 228 K/UL (ref 135–420)
PMV BLD AUTO: 10.8 FL (ref 9.2–11.8)
POTASSIUM SERPL-SCNC: 3.8 MMOL/L (ref 3.5–5.5)
PROT SERPL-MCNC: 7.5 G/DL (ref 6.4–8.2)
PROTHROMBIN TIME: 13.9 SEC (ref 11.5–15.2)
RBC # BLD AUTO: 4.24 M/UL (ref 4.2–5.3)
SERVICE CMNT-IMP: NORMAL
SODIUM SERPL-SCNC: 141 MMOL/L (ref 136–145)
TROPONIN I SERPL-MCNC: <0.02 NG/ML (ref 0–0.04)
WBC # BLD AUTO: 9.1 K/UL (ref 4.6–13.2)

## 2020-10-10 PROCEDURE — 94640 AIRWAY INHALATION TREATMENT: CPT

## 2020-10-10 PROCEDURE — 93005 ELECTROCARDIOGRAM TRACING: CPT

## 2020-10-10 PROCEDURE — 85610 PROTHROMBIN TIME: CPT

## 2020-10-10 PROCEDURE — 74011250637 HC RX REV CODE- 250/637: Performed by: INTERNAL MEDICINE

## 2020-10-10 PROCEDURE — 82550 ASSAY OF CK (CPK): CPT

## 2020-10-10 PROCEDURE — 80053 COMPREHEN METABOLIC PANEL: CPT

## 2020-10-10 PROCEDURE — 74011000250 HC RX REV CODE- 250: Performed by: INTERNAL MEDICINE

## 2020-10-10 PROCEDURE — 99238 HOSP IP/OBS DSCHRG MGMT 30/<: CPT | Performed by: INTERNAL MEDICINE

## 2020-10-10 PROCEDURE — 74011250636 HC RX REV CODE- 250/636: Performed by: INTERNAL MEDICINE

## 2020-10-10 PROCEDURE — 71046 X-RAY EXAM CHEST 2 VIEWS: CPT

## 2020-10-10 PROCEDURE — 83735 ASSAY OF MAGNESIUM: CPT

## 2020-10-10 PROCEDURE — 99284 EMERGENCY DEPT VISIT MOD MDM: CPT

## 2020-10-10 PROCEDURE — 85025 COMPLETE CBC W/AUTO DIFF WBC: CPT

## 2020-10-10 RX ORDER — NAPROXEN 500 MG/1
500 TABLET ORAL 2 TIMES DAILY WITH MEALS
Qty: 20 TAB | Refills: 0 | Status: SHIPPED | OUTPATIENT
Start: 2020-10-10 | End: 2020-10-20

## 2020-10-10 RX ADMIN — SUCRALFATE 1 G: 1 TABLET ORAL at 05:44

## 2020-10-10 RX ADMIN — ARFORMOTEROL TARTRATE 15 MCG: 15 SOLUTION RESPIRATORY (INHALATION) at 07:30

## 2020-10-10 RX ADMIN — Medication 10 ML: at 05:44

## 2020-10-10 RX ADMIN — BUDESONIDE 500 MCG: 0.5 INHALANT RESPIRATORY (INHALATION) at 07:30

## 2020-10-10 RX ADMIN — SUCRALFATE 1 G: 1 TABLET ORAL at 01:18

## 2020-10-10 NOTE — ROUTINE PROCESS
Bedside and Verbal shift change report given to Haresh Lehman RN (oncoming nurse) by Haley Hurtado RN (offgoing nurse). Report included the following information SBAR, Kardex, ED Summary, Procedure Summary, Intake/Output, MAR, Recent Results and Cardiac Rhythm NSR.

## 2020-10-10 NOTE — DISCHARGE SUMMARY
[facility]  Discharge Summary       Patient: Chelsy Mendez Age: 63 y.o. : 1965 MR#: [id number] SSN: [id number]  PCP on record: Adalid Mckeon NP  Admit date: 10/7/2020  Discharge date: 10/9/20  Consults:  -ARETHA Zabala,-nephrology  -   Procedures: none  -     Significant Diagnostic Studies: CXT 10/7/20: IMPRESSION:  Exam limitations as above.     1.  Mild pulmonary vascular congestion. 2.  Otherwise, no radiographic evidence of acute cardiopulmonary process.   -    Discharge Diagnoses:  -DEMARCUS  -UTI                                         Patient Active Problem List   Diagnosis Code    Abnormal CT of the chest R93.89    Asthma J45.909    Respiratory failure ([address] J96.90    Acute exacerbation of COPD with asthma ([address] J44.1, J39.36    Essential hypertension I10    Morbid obesity due to excess calories ([address] E66.01    Hypertensive heart failure ([address] I11.0    Sleep apnea G47.30    COPD (chronic obstructive pulmonary disease) with chronic bronchitis (HCC) J44.9    T wave inversion in EKG R94.31    Tobacco use Z72.0    S/P hernia repair Z98.890, Z87.19    CKD (chronic kidney disease) stage 3, GFR 30-59 ml/min N18.30    Cocaine abuse ([address] F14.10    Drug-seeking behavior Z76.5    Fibromyalgia M79.7    Depression F32.9    Fe deficiency anemia D50.9    Gastroesophageal reflux disease without esophagitis K21.9    HLD (hyperlipidemia) E78.5    Thyroid goiter E04.9    MDRO (multiple drug resistant organisms) resistance [id number]    On home O2 Z99.81    Polysubstance abuse (HCC) D35.77    Uncomplicated severe persistent asthma J45.50    Vocal cord disease J38.3    COPD with acute exacerbation (HCC) J44.1    Chronic respiratory failure with hypoxia (HCC) J96.11    Acute bronchitis J20.9    Suspected COVID-19 virus infection Z20.828    Tachycardia R00.0    Normocytic anemia D64.9    Acute respiratory distress R06.03    COPD exacerbation (HCC) J44.1    Acute exacerbation of chronic obstructive pulmonary disease (COPD) ([facility]) J44.1    UTI (urinary tract infection) N39.0    Atypical chest pain R07.89    DEMARCUS (acute kidney injury) [facility]) N17.9       Hospital Course by Problem     Patient is a 65-year-old female with multiple medical problems including polysubstance abuse, non compliance, COPD on home 02 admitted after she was found to have evidence of acute kidney injury with a presenting complaint of chest pain, abdominal pain and back pain. Home medication regimen included Lasix and lisinopril although compliance is of question. Patient was admitted and seen by the nephrology consultant. Overall impression was that she had acute renal failure related to hypotension, ACE inhibitor, Lasix, drug abuse. Cause for hypotension seems to be iatrogenic as other reasons are not present. Hypotension could be due to polypharmacy although again compliance with her home regimen of medications is in question. She did have evidence of a urinary tract infection as she had complaints of bladder symptoms and an abnormal urinalysis. She was given antibiotics here. With hydration her renal function improved and the plan was to stop her fluids to see how she did with her hemodynamic status as well as her renal function. However patient left AGAINST MEDICAL ADVICE. Prior to her leaving I did advise her not to take all her blood pressure medications and to possibly continue with clonidine should her blood pressure continue to be elevated.     Today's examination of the patient revealed:     Subjective:     Objective:   VS:   Visit Vitals  BP (!) 169/80   Pulse 73   Temp 97.6 °F (36.4 °C)   Resp 18   Wt 72.6 kg (160 lb)   LMP 2009   SpO2 99%   BMI 32.32 kg/m²      Tmax/24hrs: Temp (24hrs), Av.2 °F (36.8 °C), Min:97 °F (36.1 °C), Max:99.4 °F (37.4 °C)     Input/Output:     Intake/Output Summary (Last 24 hours) at 10/10/2020 0911  Last data filed at 10/9/2020 2000  Gross per 24 hour   Intake 1251.25 ml   Output 1550 ml   Net -298.75 ml       General: alert, awake, in nad    Cardiovascular:  Rrr, no murmurs  Pulmonary:  ctab  GI:soft, nt, nd    Extremities: no edema    Additional:      Labs:    No results found for this or any previous visit (from the past 24 hour(s)). Additional Data Reviewed:     Condition on discharge: guarded, patient left AGAINST MEDICAL ADVICE  Disposition:    [x]Home   []Home with Home Health   []SNF/NH   []Rehab   []Home with family   []Alternate Facility:____________________      Discharge Medications:   Cannot display discharge medications since this patient is not currently admitted. Follow-up Appointments:   1.  Your PCP: Lazara Franz NP, within 7-10days    >30 minutes spent coordinating this discharge (review instructions/follow-up, prescriptions, preparing report for sign off)    Signed:  Joy Saldivar MD  10/10/2020  9:11 AM

## 2020-10-11 ENCOUNTER — APPOINTMENT (OUTPATIENT)
Dept: CT IMAGING | Age: 55
End: 2020-10-11
Attending: EMERGENCY MEDICINE
Payer: MEDICAID

## 2020-10-11 ENCOUNTER — HOSPITAL ENCOUNTER (EMERGENCY)
Age: 55
Discharge: HOME | End: 2020-10-11
Attending: EMERGENCY MEDICINE
Payer: MEDICAID

## 2020-10-11 ENCOUNTER — APPOINTMENT (OUTPATIENT)
Dept: GENERAL RADIOLOGY | Age: 55
End: 2020-10-11
Attending: EMERGENCY MEDICINE
Payer: MEDICAID

## 2020-10-11 VITALS
OXYGEN SATURATION: 100 % | SYSTOLIC BLOOD PRESSURE: 140 MMHG | TEMPERATURE: 97.8 F | WEIGHT: 156 LBS | HEART RATE: 76 BPM | DIASTOLIC BLOOD PRESSURE: 80 MMHG | BODY MASS INDEX: 31.45 KG/M2 | RESPIRATION RATE: 18 BRPM | HEIGHT: 59 IN

## 2020-10-11 DIAGNOSIS — R07.81 PLEURITIC CHEST PAIN: Primary | ICD-10-CM

## 2020-10-11 LAB
ALBUMIN SERPL-MCNC: 3.2 G/DL (ref 3.4–5)
ALBUMIN/GLOB SERPL: 0.9 {RATIO} (ref 0.8–1.7)
ALP SERPL-CCNC: 78 U/L (ref 45–117)
ALT SERPL-CCNC: 29 U/L (ref 13–56)
ANION GAP SERPL CALC-SCNC: 6 MMOL/L (ref 3–18)
AST SERPL-CCNC: 20 U/L (ref 10–38)
ATRIAL RATE: 62 BPM
ATRIAL RATE: 87 BPM
BASOPHILS # BLD: 0 K/UL (ref 0–0.1)
BASOPHILS NFR BLD: 0 % (ref 0–2)
BILIRUB SERPL-MCNC: 0.2 MG/DL (ref 0.2–1)
BUN SERPL-MCNC: 16 MG/DL (ref 7–18)
BUN/CREAT SERPL: 16 (ref 12–20)
CALCIUM SERPL-MCNC: 8.5 MG/DL (ref 8.5–10.1)
CALCULATED P AXIS, ECG09: 68 DEGREES
CALCULATED P AXIS, ECG09: 74 DEGREES
CALCULATED R AXIS, ECG10: 22 DEGREES
CALCULATED R AXIS, ECG10: 58 DEGREES
CALCULATED T AXIS, ECG11: 28 DEGREES
CALCULATED T AXIS, ECG11: 48 DEGREES
CHLORIDE SERPL-SCNC: 110 MMOL/L (ref 100–111)
CK MB CFR SERPL CALC: 1.2 % (ref 0–4)
CK MB CFR SERPL CALC: 1.4 % (ref 0–4)
CK MB SERPL-MCNC: 1.4 NG/ML (ref 5–25)
CK MB SERPL-MCNC: 1.7 NG/ML (ref 5–25)
CK SERPL-CCNC: 137 U/L (ref 26–192)
CK SERPL-CCNC: 99 U/L (ref 26–192)
CO2 SERPL-SCNC: 27 MMOL/L (ref 21–32)
CREAT SERPL-MCNC: 0.98 MG/DL (ref 0.6–1.3)
DIAGNOSIS, 93000: NORMAL
DIAGNOSIS, 93000: NORMAL
DIFFERENTIAL METHOD BLD: ABNORMAL
EOSINOPHIL # BLD: 0.1 K/UL (ref 0–0.4)
EOSINOPHIL NFR BLD: 2 % (ref 0–5)
ERYTHROCYTE [DISTWIDTH] IN BLOOD BY AUTOMATED COUNT: 12.6 % (ref 11.6–14.5)
GLOBULIN SER CALC-MCNC: 3.6 G/DL (ref 2–4)
GLUCOSE SERPL-MCNC: 95 MG/DL (ref 74–99)
HCT VFR BLD AUTO: 35.7 % (ref 35–45)
HGB BLD-MCNC: 11.9 G/DL (ref 12–16)
LYMPHOCYTES # BLD: 1.7 K/UL (ref 0.9–3.6)
LYMPHOCYTES NFR BLD: 30 % (ref 21–52)
MAGNESIUM SERPL-MCNC: 1.8 MG/DL (ref 1.6–2.6)
MCH RBC QN AUTO: 30.1 PG (ref 24–34)
MCHC RBC AUTO-ENTMCNC: 33.3 G/DL (ref 31–37)
MCV RBC AUTO: 90.2 FL (ref 74–97)
MONOCYTES # BLD: 0.4 K/UL (ref 0.05–1.2)
MONOCYTES NFR BLD: 8 % (ref 3–10)
NEUTS SEG # BLD: 3.3 K/UL (ref 1.8–8)
NEUTS SEG NFR BLD: 60 % (ref 40–73)
P-R INTERVAL, ECG05: 132 MS
P-R INTERVAL, ECG05: 132 MS
PLATELET # BLD AUTO: 197 K/UL (ref 135–420)
PMV BLD AUTO: 10.9 FL (ref 9.2–11.8)
POTASSIUM SERPL-SCNC: 3.9 MMOL/L (ref 3.5–5.5)
PROT SERPL-MCNC: 6.8 G/DL (ref 6.4–8.2)
Q-T INTERVAL, ECG07: 376 MS
Q-T INTERVAL, ECG07: 448 MS
QRS DURATION, ECG06: 90 MS
QRS DURATION, ECG06: 94 MS
QTC CALCULATION (BEZET), ECG08: 452 MS
QTC CALCULATION (BEZET), ECG08: 454 MS
RBC # BLD AUTO: 3.96 M/UL (ref 4.2–5.3)
SODIUM SERPL-SCNC: 143 MMOL/L (ref 136–145)
TROPONIN I SERPL-MCNC: <0.02 NG/ML (ref 0–0.04)
TROPONIN I SERPL-MCNC: <0.02 NG/ML (ref 0–0.04)
VENTRICULAR RATE, ECG03: 62 BPM
VENTRICULAR RATE, ECG03: 87 BPM
WBC # BLD AUTO: 5.5 K/UL (ref 4.6–13.2)

## 2020-10-11 PROCEDURE — 96376 TX/PRO/DX INJ SAME DRUG ADON: CPT

## 2020-10-11 PROCEDURE — 94762 N-INVAS EAR/PLS OXIMTRY CONT: CPT

## 2020-10-11 PROCEDURE — 80053 COMPREHEN METABOLIC PANEL: CPT

## 2020-10-11 PROCEDURE — 85025 COMPLETE CBC W/AUTO DIFF WBC: CPT

## 2020-10-11 PROCEDURE — 93005 ELECTROCARDIOGRAM TRACING: CPT

## 2020-10-11 PROCEDURE — 74011250636 HC RX REV CODE- 250/636: Performed by: EMERGENCY MEDICINE

## 2020-10-11 PROCEDURE — 96374 THER/PROPH/DIAG INJ IV PUSH: CPT

## 2020-10-11 PROCEDURE — 71275 CT ANGIOGRAPHY CHEST: CPT

## 2020-10-11 PROCEDURE — 99284 EMERGENCY DEPT VISIT MOD MDM: CPT

## 2020-10-11 PROCEDURE — 74011000636 HC RX REV CODE- 636: Performed by: EMERGENCY MEDICINE

## 2020-10-11 PROCEDURE — 71045 X-RAY EXAM CHEST 1 VIEW: CPT

## 2020-10-11 PROCEDURE — 82550 ASSAY OF CK (CPK): CPT

## 2020-10-11 PROCEDURE — 83735 ASSAY OF MAGNESIUM: CPT

## 2020-10-11 RX ORDER — MORPHINE SULFATE 4 MG/ML
4 INJECTION, SOLUTION INTRAMUSCULAR; INTRAVENOUS
Status: COMPLETED | OUTPATIENT
Start: 2020-10-11 | End: 2020-10-11

## 2020-10-11 RX ORDER — OXYCODONE AND ACETAMINOPHEN 5; 325 MG/1; MG/1
1 TABLET ORAL
Qty: 6 TAB | Refills: 0 | Status: SHIPPED | OUTPATIENT
Start: 2020-10-11 | End: 2020-10-18

## 2020-10-11 RX ADMIN — MORPHINE SULFATE 4 MG: 4 INJECTION, SOLUTION INTRAMUSCULAR; INTRAVENOUS at 12:40

## 2020-10-11 RX ADMIN — IOPAMIDOL 100 ML: 755 INJECTION, SOLUTION INTRAVENOUS at 14:30

## 2020-10-11 RX ADMIN — MORPHINE SULFATE 4 MG: 4 INJECTION, SOLUTION INTRAMUSCULAR; INTRAVENOUS at 14:54

## 2020-10-11 NOTE — ED NOTES
Vinod Cueva NP to triage to evaluate pt. Pt with belligerent behavior, yelling, cursing, waiving her hands in the are, attempting to argue with staff. Per chart review pt left ama yesterday.   Pt states \"I left because the nurses was being nasty to me just like yall are now\"

## 2020-10-11 NOTE — ED NOTES
Reviewed discharge/precription instructions with pt. Pt verbalized understanding of instructions. Pt wheeled to waiting room by nurse, in stable condition, with mild shortness of breath noted.

## 2020-10-11 NOTE — ED TRIAGE NOTES
\"Im being seen for my fucking back and stomach same shit I been seen for for the last two years\"  Pt presented via medic, medics state pt c/o abd pain. Pt brought to ed lobby for triage by medic, pt then ambulated into the parking lot for approximately 10 minutes and states I was outside smoking a blunt. I need my oxygen. Pt speaking in full sentences without difficulty.   Pt states she is upset because she did not go straight to a room

## 2020-10-11 NOTE — ED PROVIDER NOTES
[facility]  Emergency Department Treatment Report        8:37 PM Jazmine Seth is a 63 y.o. female with a history of cocaine abuse congestive heart failure coronary artery disease drug abuse fibromyalgia and recently acute kidney injury who presents to ED because of back pain. Patient states that she has had back pain she left AMA from the hospital yesterday after being admitted for acute kidney injury. And she was not given any medicine. Patient stating no fever no abdominal pain no other symptoms. No injury was reported. When I entered into the triage area patient was yelling and expressing profanity to the staff and was aggressive. Patient was asked not to be aggressive and it was necessary to de-escalate the situation to allow the patient for us to treat her. Patient agreed to cooperate after a few minutes of de-escalation therapy. No other complaints, associated symptoms or modifying factors at this time.     PCP: Sterling Mckeon NP             Past Medical History:   Diagnosis Date    CHF (congestive heart failure) ([address]     Chronic respiratory failure with hypoxia ([address] 9/7/2020    CKD (chronic kidney disease) stage 3, GFR 30-59 ml/min 10/31/2019    Cocaine abuse ([address] 11/26/2017    COPD (chronic obstructive pulmonary disease) with chronic bronchitis ([address] 12/19/2017    Dependence on supplemental oxygen     Depression 3/22/2020    Drug-seeking behavior 11/19/2016    Endocrine disease     thyroid issues    Essential hypertension 3/10/2017    Fe deficiency anemia 10/27/2012    Fibromyalgia 12/16/2016    Gastroesophageal reflux disease without esophagitis 10/10/2016    Gastrointestinal disorder     \"blockage in my stomach\"    Hypercholesterolemia     Hypertension     Hypertensive heart failure ([address] 12/19/2017    Morbid obesity due to excess calories ([address] 3/10/2017    NSTEMI (non-ST elevated myocardial infarction) ([address] 3/22/2020    On home O2 12/3/2015    Overview: 2L at home per pt for approx 8 years    Polysubstance abuse ([facility] 9/15/2018    Respiratory failure ([facility] 1/11/2017    S/P hernia repair 10/31/2019    Sleep apnea 12/19/2017    T wave inversion in EKG 3/9/2019    Tobacco use 2/44/5931    Uncomplicated severe persistent asthma 12/13/2016    Vocal cord disease 12/7/2016       Past Surgical History:   Procedure Laterality Date    HX GI      Exploratory laparotomy with lysis of adhesions and primary repair of incarcerated umbilical hernia         History reviewed. No pertinent family history.     Social History     Socioeconomic History    Marital status: SINGLE     Spouse name: Not on file    Number of children: Not on file    Years of education: Not on file    Highest education level: Not on file   Occupational History    Not on file   Social Needs    Financial resource strain: Not on file    Food insecurity     Worry: Not on file     Inability: Not on file    Transportation needs     Medical: Not on file     Non-medical: Not on file   Tobacco Use    Smoking status: Current Every Day Smoker     Packs/day: 0.25    Smokeless tobacco: Current User   Substance and Sexual Activity    Alcohol use: No     Comment: socially    Drug use: Not Currently     Types: Heroin    Sexual activity: Not Currently     Comment: last used 9 years ago   Lifestyle    Physical activity     Days per week: Not on file     Minutes per session: Not on file    Stress: Not on file   Relationships    Social connections     Talks on phone: Not on file     Gets together: Not on file     Attends Cheondo[employer] service: Not on file     Active member of club or organization: Not on file     Attends meetings of clubs or organizations: Not on file     Relationship status: Not on file    Intimate partner violence     Fear of current or ex partner: Not on file     Emotionally abused: Not on file     Physically abused: Not on file     Forced sexual activity: Not on file   Other Topics Concern    Not on file   Social History Narrative    Not on file         ALLERGIES: Patient has no known allergies. Review of Systems   Constitutional: Negative for fever. Respiratory: Negative for chest tightness and shortness of breath. Cardiovascular: Positive for chest pain. Gastrointestinal: Negative for abdominal pain. Musculoskeletal: Positive for back pain. Negative for joint swelling, myalgias, neck pain and neck stiffness. Neurological: Negative for dizziness. All other systems reviewed and are negative. Vitals:    10/10/20 2012   BP: (!) 162/90   Pulse: 96   Resp: 14   Temp: 98.3 °F (36.8 °C)   SpO2: 99%   Weight: 70.8 kg (156 lb)            Physical Exam  Vitals signs and nursing note reviewed. Constitutional:       General: She is not in acute distress. Appearance: She is well-developed. She is not ill-appearing, toxic-appearing or diaphoretic. HENT:      Head: Normocephalic and atraumatic. Eyes:      Conjunctiva/sclera: Conjunctivae normal.      Pupils: Pupils are equal, round, and reactive to light. Neck:      Musculoskeletal: Normal range of motion and neck supple. No muscular tenderness. Cardiovascular:      Rate and Rhythm: Normal rate and regular rhythm. Pulmonary:      Effort: Pulmonary effort is normal. No respiratory distress. Breath sounds: Normal breath sounds. No stridor. Abdominal:      General: Bowel sounds are normal.      Palpations: Abdomen is soft. Tenderness: There is no abdominal tenderness. Musculoskeletal: Normal range of motion. Skin:     General: Skin is warm and dry. Capillary Refill: Capillary refill takes less than 2 seconds. Neurological:      General: No focal deficit present. Mental Status: She is alert and oriented to person, place, and time. Deep Tendon Reflexes: Reflexes are normal and symmetric. Psychiatric:         Mood and Affect: Affect is angry and inappropriate.          Behavior: Behavior is agitated and aggressive. Thought Content: Thought content normal.         Judgment: Judgment is impulsive. MDM  Number of Diagnoses or Management Options  Musculoskeletal pain:   Diagnosis management comments: After controlling the situation and de-escalating patient's anger and aggression she accepted treatment and labs EKG chest x-ray were obtained. Labs were negative patient's renal function has returned to normal chest x-ray was normal first troponin was negative. A second troponin was not indicated as patient has just been in the hospital and had recent troponin. I do not suspect CAD or any other coronary artery issue. I suspect musculoskeletal pain. I will send Naprosyn to the pharmacy. When discharging patient and reviewing her results patient repeatedly apologized for her behavior and I discussed follow-up and possible counseling for her issues that she has been dealing with. Patient thanked me for my services and apologized again and was sent to the waiting room. No other acute illness was suspected patient discharged home in no distress.            Amount and/or Complexity of Data Reviewed  Clinical lab tests: ordered and reviewed  Tests in the radiology section of CPT®: ordered and reviewed  Review and summarize past medical records: yes  Independent visualization of images, tracings, or specimens: yes    Risk of Complications, Morbidity, and/or Mortality  Presenting problems: moderate  Diagnostic procedures: moderate  Management options: moderate    Patient Progress  Patient progress: stable         Procedures            Vitals:  Patient Vitals for the past 12 hrs:   Temp Pulse Resp BP SpO2   10/10/20 2012 98.3 °F (36.8 °C) 96 14 (!) 162/90 99 %       Medications ordered:   Medications - No data to display      Lab findings:  Recent Results (from the past 12 hour(s))   CBC WITH AUTOMATED DIFF    Collection Time: 10/10/20  8:40 PM   Result Value Ref Range    WBC 9.1 4.6 - 13.2 K/uL    RBC 4.24 4.20 - 5.30 M/uL    HGB 12.9 12.0 - 16.0 g/dL    HCT 38.5 35.0 - 45.0 %    MCV 90.8 74.0 - 97.0 FL    MCH 30.4 24.0 - 34.0 PG    MCHC 33.5 31.0 - 37.0 g/dL    RDW 13.0 11.6 - 14.5 %    PLATELET 663 053 - 502 K/uL    MPV 10.8 9.2 - 11.8 FL    NEUTROPHILS 66 40 - 73 %    LYMPHOCYTES 26 21 - 52 %    MONOCYTES 7 3 - 10 %    EOSINOPHILS 1 0 - 5 %    BASOPHILS 0 0 - 2 %    ABS. NEUTROPHILS 6.0 1.8 - 8.0 K/UL    ABS. LYMPHOCYTES 2.3 0.9 - 3.6 K/UL    ABS. MONOCYTES 0.6 0.05 - 1.2 K/UL    ABS. EOSINOPHILS 0.1 0.0 - 0.4 K/UL    ABS. BASOPHILS 0.0 0.0 - 0.1 K/UL    DF AUTOMATED     PROTHROMBIN TIME + INR    Collection Time: 10/10/20  8:40 PM   Result Value Ref Range    Prothrombin time 13.9 11.5 - 15.2 sec    INR 1.1 0.8 - 1.2     METABOLIC PANEL, COMPREHENSIVE    Collection Time: 10/10/20  8:40 PM   Result Value Ref Range    Sodium 141 136 - 145 mmol/L    Potassium 3.8 3.5 - 5.5 mmol/L    Chloride 107 100 - 111 mmol/L    CO2 31 21 - 32 mmol/L    Anion gap 3 3.0 - 18 mmol/L    Glucose 109 (H) 74 - 99 mg/dL    BUN 19 (H) 7.0 - 18 MG/DL    Creatinine 1.17 0.6 - 1.3 MG/DL    BUN/Creatinine ratio 16 12 - 20      GFR est AA 58 (L) >60 ml/min/1.73m2    GFR est non-AA 48 (L) >60 ml/min/1.73m2    Calcium 9.0 8.5 - 10.1 MG/DL    Bilirubin, total 0.2 0.2 - 1.0 MG/DL    ALT (SGPT) 27 13 - 56 U/L    AST (SGOT) 21 10 - 38 U/L    Alk. phosphatase 89 45 - 117 U/L    Protein, total 7.5 6.4 - 8.2 g/dL    Albumin 3.6 3.4 - 5.0 g/dL    Globulin 3.9 2.0 - 4.0 g/dL    A-G Ratio 0.9 0.8 - 1.7     MAGNESIUM    Collection Time: 10/10/20  8:40 PM   Result Value Ref Range    Magnesium 1.9 1.6 - 2.6 mg/dL   CARDIAC PANEL,(CK, CKMB & TROPONIN)    Collection Time: 10/10/20  8:40 PM   Result Value Ref Range    CK - MB 1.4 <3.6 ng/ml    CK-MB Index 0.7 0.0 - 4.0 %     (H) 26 - 192 U/L    Troponin-I, QT <0.02 0.0 - 0.045 NG/ML         EKG:  NSR. .. Normal Axis. Normal Intervals. No ST elevation or depression. No Hypertrophy. 9:55 PM      X-Ray, CT or other radiology findings or impressions:  XR CHEST PA LAT    (Results Pending)       No acute finding per my read       Disposition:    Diagnosis:   1. Musculoskeletal pain        Disposition: to Home      Follow-up Information     Follow up With Specialties Details Why Contact Info    George Bird NP Nurse Practitioner In 2 days  [address] [place] [address] [phone]             Discharge Medication List as of 10/10/2020  9:22 PM      START taking these medications    Details   naproxen (Naprosyn) 500 mg tablet Take 1 Tab by mouth two (2) times daily (with meals) for 10 days. , Normal, Disp-20 Tab,R-0         CONTINUE these medications which have NOT CHANGED    Details   cephALEXin (Keflex) 250 mg capsule Take 1 Cap by mouth two (2) times a day for 7 days. , Normal, Disp-14 Cap,R-0      albuterol sulfate 90 mcg/actuation aebs Take 2 Puffs by inhalation every four to six (4-6) hours as needed for Wheezing for up to 30 days. , Normal, Disp-1 Inhaler,R-0      albuterol-ipratropium (DUO-NEB) 2.5 mg-0.5 mg/3 ml nebu 3 mL by Nebulization route every four (4) hours as needed for Wheezing, Shortness of Breath or Cough., Normal, Disp-30 Nebule,R-0      arformoteroL (BROVANA) 15 mcg/2 mL nebu neb solution 2 mL by Nebulization route two (2) times a day. Indications: bronchospasm prevention with COPD, Normal, Disp-60 Vial,R-0      budesonide (PULMICORT) 0.5 mg/2 mL nbsp 2 mL by Nebulization route two (2) times a day., Normal, Disp-60 Each,R-0      fluticasone furoate-vilanteroL (Breo Ellipta) 200-25 mcg/dose inhaler Take 1 Puff by inhalation daily. , Normal, Disp-1 Inhaler,R-0      tiotropium (SPIRIVA) 18 mcg inhalation capsule Take 1 Cap by inhalation daily for 30 days. , Normal, Disp-30 Cap,R-0      citalopram (CeleXA) 20 mg tablet 1 tablet, Historical Med      gemfibroziL (LOPID) 600 mg tablet 1 tablet, Historical Med      sucralfate (CARAFATE) 100 mg/mL suspension Take 1,000 mg by mouth., Historical Med      QUEtiapine (SEROquel) 50 mg tablet TAKE 1 TABLET BY MOUTH AT BEDTIME FOR MOOD, Historical Med      ALPRAZolam (Xanax) 0.5 mg tablet Take 1 Tab by mouth every eight (8) hours as needed for Anxiety. Max Daily Amount: 1.5 mg., Print, Disp-20 Tab, R-0      aspirin delayed-release 81 mg tablet Take 1 Tab by mouth daily. , Print, Disp-30 Tab, R-0      famotidine (PEPCID) 20 mg tablet Take 1 Tab by mouth daily. , Print, Disp-30 Tab, R-1      OXYGEN-AIR DELIVERY SYSTEMS 3 L by IntraNASal route as needed for Other (sob). , Historical Med      docusate sodium (COLACE) 100 mg capsule Take 1 Cap by mouth two (2) times a day., Normal, Disp-120 Cap, R-0      atorvastatin (LIPITOR) 20 mg tablet Take 1 Tab by mouth nightly. , Print, Disp-30 Tab, R-0      roflumilast (DALIRESP) 500 mcg tab tablet Take 1 Tab by mouth daily. , Print, Disp-30 Tab, R-0      naloxone (NARCAN) 4 mg/actuation nasal spray Use 1 spray intranasally, then discard. Repeat with new spray every 2 min as needed for opioid overdose symptoms, alternating nostrils. , Print, Disp-1 Each, R-0      montelukast (SINGULAIR) 10 mg tablet Take 1 Tab by mouth nightly. , Print, Disp-30 Tab, R-1         STOP taking these medications       cloNIDine HCL (CATAPRES) 0.2 mg tablet Comments:   Reason for Stopping:         hydroCHLOROthiazide (HYDRODIURIL) 25 mg tablet Comments:   Reason for Stopping:         isosorbide mononitrate ER (IMDUR) 30 mg tablet Comments:   Reason for Stopping:         lisinopriL (PRINIVIL, ZESTRIL) 20 mg tablet Comments:   Reason for Stopping:         furosemide (Lasix) 20 mg tablet Comments:   Reason for Stopping:         amLODIPine (NORVASC) 10 mg tablet Comments:   Reason for Stopping:         metoprolol tartrate (LOPRESSOR) 25 mg tablet Comments:   Reason for Stopping:               Return to the ER if you are unable to obtain referral as directed. Brie James  results have been reviewed with her.   She has been counseled regarding her diagnosis, treatment, and plan. She verbally conveys understanding and agreement of the signs, symptoms, diagnosis, treatment and prognosis and additionally agrees to follow up as discussed. She also agrees with the care-plan and conveys that all of her questions have been answered. I have also provided discharge instructions for her that include: educational information regarding their diagnosis and treatment, and list of reasons why they would want to return to the ED prior to their follow-up appointment, should her condition change. Bonnie Villeda ENP-C,FNP-C      Dragon voice recognition was used to generate this report, which may have resulted in some phonetic based errors in grammar and contents.  Even though attempts were made to correct all the mistakes, some may have been missed, and remained in the body of the document

## 2020-10-11 NOTE — ED PROVIDER NOTES
EMERGENCY DEPARTMENT HISTORY AND PHYSICAL EXAM  This was created with voice recognition software and transcription errors may be present. 10:46 AM  Date: 10/11/2020  Patient Name: Adriel Saini    History of Presenting Illness     Chief Complaint:    History Provided By:     HPI: Adriel Saini is a 63 y.o. female has medical history of CHF CKD cocaine abuse oxygen dependence drug-seeking behavior endocrine disease thyroid issues hypertension fibromyalgia high cholesterol morbid obesity and STEMI who presents with back pain. Chief complaints of flank pain exertional chest pain are noted. The patient clearly has thoracic back pain which is worse with deep breathing. She does not have flank pain. She has no fevers no chills slight cough patient notes increased work of breathing when she is at home inside of the house and is concerned that she is exposed to mold. She feels better when she leaves.   No other aggravating alleviating factors no other associated symptoms patient adamantly denies ever using IV drugs PCP: Nettei Franco NP      Past History     Past Medical History:  Past Medical History:   Diagnosis Date    CHF (congestive heart failure) ([address]     Chronic respiratory failure with hypoxia ([address] 9/7/2020    CKD (chronic kidney disease) stage 3, GFR 30-59 ml/min 10/31/2019    Cocaine abuse ([address] 11/26/2017    COPD (chronic obstructive pulmonary disease) with chronic bronchitis ([address] 12/19/2017    Dependence on supplemental oxygen     Depression 3/22/2020    Drug-seeking behavior 11/19/2016    Endocrine disease     thyroid issues    Essential hypertension 3/10/2017    Fe deficiency anemia 10/27/2012    Fibromyalgia 12/16/2016    Gastroesophageal reflux disease without esophagitis 10/10/2016    Gastrointestinal disorder     \"blockage in my stomach\"    Hypercholesterolemia     Hypertension     Hypertensive heart failure ([address] 12/19/2017    Morbid obesity due to excess calories ([address] 3/10/2017    NSTEMI (non-ST elevated myocardial infarction) ([facility] 3/22/2020    On home O2 12/3/2015    Overview:  2L at home per pt for approx 8 years    Polysubstance abuse ([facility] 9/15/2018    Respiratory failure ([facility] 1/11/2017    S/P hernia repair 10/31/2019    Sleep apnea 12/19/2017    T wave inversion in EKG 3/9/2019    Tobacco use 9/80/6613    Uncomplicated severe persistent asthma 12/13/2016    Vocal cord disease 12/7/2016       Past Surgical History:  Past Surgical History:   Procedure Laterality Date    HX GI      Exploratory laparotomy with lysis of adhesions and primary repair of incarcerated umbilical hernia       Family History:  No family history on file. Social History:  Social History     Tobacco Use    Smoking status: Current Every Day Smoker     Packs/day: 0.25    Smokeless tobacco: Current User   Substance Use Topics    Alcohol use: No     Comment: socially    Drug use: Not Currently     Types: Heroin       Allergies:  No Known Allergies    Review of Systems     Review of Systems   All other systems reviewed and are negative. 10 point review of systems otherwise negative unless noted in HPI. Physical Exam       Physical Exam  Constitutional:       Appearance: She is well-developed. HENT:      Head: Normocephalic and atraumatic. Eyes:      Pupils: Pupils are equal, round, and reactive to light. Neck:      Musculoskeletal: Normal range of motion and neck supple. Cardiovascular:      Rate and Rhythm: Normal rate and regular rhythm. Heart sounds: Normal heart sounds. No murmur. No friction rub. Pulmonary:      Effort: Pulmonary effort is normal. No respiratory distress. Breath sounds: Wheezing present. Abdominal:      General: There is no distension. Palpations: Abdomen is soft. Tenderness: There is no abdominal tenderness. There is no guarding or rebound. Musculoskeletal: Normal range of motion. Skin:     General: Skin is warm and dry. Neurological:      Mental Status: She is alert and oriented to person, place, and time. Psychiatric:         Behavior: Behavior normal.         Thought Content: Thought content normal.         Diagnostic Study Results     Vital Signs  EKG: EKG shows sinus at 62 with a normal axis and normal intervals there is no ST elevation or depression and no hypertrophy  Labs: CBC and chemistry unremarkable  Imaging:     Medical Decision Making     ED Course: Progress Notes, Reevaluation, and Consults:      Provider Notes (Medical Decision Making): This is 54-year-old female well-known to me from multiple prior visits for chest pain. She was recently hospitalized for DEMARCUS and left AMA as she was not given pain meds. She has some wheezing which is typical of her she has mild expiratory wheeze. She does have this pleuritic back pain but denies any IV drug use. Consideration for CTA will check her creatinine first and then see if we can obtain one low suspicion of an epidural abscess given she denies ever using IV drugs. I seen her many times in the ER and she does look like she is uncomfortable. This is not her typical self, will continue with evaluation and determine need for admission or further imaging. He was reluctant to get a CAT scan considering she just had renal failure. I reviewed her chart and her creatinine did actually go up to 7. Discussed the case with Bam Arechiga from nephrology who states given the current normal creatinine likely okay to have a CT scan discussed this with the patient. IMPRESSION  IMPRESSION:  No evidence for pulmonary emboli. Mild bronchitis or central venous congestion. Main pulmonary artery is mildly enlarged as may be seen in pulmonary arterial  hypertension. Left atrium appears enlarged. Thyromegaly and multinodular goiter. Mild anasarca.     Patient with chronic bronchitis on 4 L home oxygen without infectious symptoms has pleuritic chest pain CTA is negative will discharge for outpatient follow-up. we will check troponins. If negative and then have her follow-up with her PCP       Diagnosis     Clinical Impression: No diagnosis found. Disposition:    Patient's Medications   Start Taking    No medications on file   Continue Taking    ALBUTEROL SULFATE 90 MCG/ACTUATION AEBS    Take 2 Puffs by inhalation every four to six (4-6) hours as needed for Wheezing for up to 30 days. ALBUTEROL-IPRATROPIUM (DUO-NEB) 2.5 MG-0.5 MG/3 ML NEBU    3 mL by Nebulization route every four (4) hours as needed for Wheezing, Shortness of Breath or Cough. ALPRAZOLAM (XANAX) 0.5 MG TABLET    Take 1 Tab by mouth every eight (8) hours as needed for Anxiety. Max Daily Amount: 1.5 mg. ARFORMOTEROL (BROVANA) 15 MCG/2 ML NEBU NEB SOLUTION    2 mL by Nebulization route two (2) times a day. Indications: bronchospasm prevention with COPD    ASPIRIN DELAYED-RELEASE 81 MG TABLET    Take 1 Tab by mouth daily. ATORVASTATIN (LIPITOR) 20 MG TABLET    Take 1 Tab by mouth nightly. BUDESONIDE (PULMICORT) 0.5 MG/2 ML NBSP    2 mL by Nebulization route two (2) times a day. CEPHALEXIN (KEFLEX) 250 MG CAPSULE    Take 1 Cap by mouth two (2) times a day for 7 days. CITALOPRAM (CELEXA) 20 MG TABLET    1 tablet    DOCUSATE SODIUM (COLACE) 100 MG CAPSULE    Take 1 Cap by mouth two (2) times a day. FAMOTIDINE (PEPCID) 20 MG TABLET    Take 1 Tab by mouth daily. FLUTICASONE FUROATE-VILANTEROL (BREO ELLIPTA) 200-25 MCG/DOSE INHALER    Take 1 Puff by inhalation daily. GEMFIBROZIL (LOPID) 600 MG TABLET    1 tablet    MONTELUKAST (SINGULAIR) 10 MG TABLET    Take 1 Tab by mouth nightly. NALOXONE (NARCAN) 4 MG/ACTUATION NASAL SPRAY    Use 1 spray intranasally, then discard. Repeat with new spray every 2 min as needed for opioid overdose symptoms, alternating nostrils. NAPROXEN (NAPROSYN) 500 MG TABLET    Take 1 Tab by mouth two (2) times daily (with meals) for 10 days.     OXYGEN-AIR DELIVERY SYSTEMS    3 L by IntraNASal route as needed for Other (sob). QUETIAPINE (SEROQUEL) 50 MG TABLET    TAKE 1 TABLET BY MOUTH AT BEDTIME FOR MOOD    ROFLUMILAST (DALIRESP) 500 MCG TAB TABLET    Take 1 Tab by mouth daily. SUCRALFATE (CARAFATE) 100 MG/ML SUSPENSION    Take 1,000 mg by mouth. TIOTROPIUM (SPIRIVA) 18 MCG INHALATION CAPSULE    Take 1 Cap by inhalation daily for 30 days.    These Medications have changed    No medications on file   Stop Taking    No medications on file

## 2020-10-11 NOTE — ED NOTES
Pt yelling in ed lobby and stating \"nobody should be getting seen before me\"  Pt verbally aggressive and abusive towards staff and registration

## 2020-10-12 LAB
C-ANCA TITR SER IF: NORMAL TITER
GBM IGG SER-ACNC: 2 UNITS (ref 0–20)
MYELOPEROXIDASE AB SER IA-ACNC: <9 U/ML (ref 0–9)
P-ANCA ATYPICAL TITR SER IF: NORMAL TITER
P-ANCA TITR SER IF: NORMAL TITER
PROTEINASE3 AB SER IA-ACNC: <3.5 U/ML (ref 0–3.5)

## 2020-10-13 LAB
ANA TITR SER IF: NEGATIVE {TITER}
PLEASE NOTE, 734348: NORMAL

## 2020-10-19 ENCOUNTER — APPOINTMENT (OUTPATIENT)
Dept: GENERAL RADIOLOGY | Age: 55
End: 2020-10-19
Attending: EMERGENCY MEDICINE
Payer: MEDICAID

## 2020-10-19 ENCOUNTER — APPOINTMENT (OUTPATIENT)
Dept: NON INVASIVE DIAGNOSTICS | Age: 55
End: 2020-10-19
Attending: PHYSICIAN ASSISTANT
Payer: MEDICAID

## 2020-10-19 ENCOUNTER — HOSPITAL ENCOUNTER (EMERGENCY)
Age: 55
Discharge: HOME | End: 2020-10-19
Attending: EMERGENCY MEDICINE
Payer: MEDICAID

## 2020-10-19 VITALS
RESPIRATION RATE: 14 BRPM | HEART RATE: 77 BPM | DIASTOLIC BLOOD PRESSURE: 73 MMHG | SYSTOLIC BLOOD PRESSURE: 141 MMHG | BODY MASS INDEX: 32.32 KG/M2 | TEMPERATURE: 98.4 F | OXYGEN SATURATION: 96 % | WEIGHT: 160 LBS

## 2020-10-19 VITALS
WEIGHT: 160 LBS | HEIGHT: 59 IN | HEART RATE: 70 BPM | TEMPERATURE: 98.3 F | SYSTOLIC BLOOD PRESSURE: 144 MMHG | OXYGEN SATURATION: 100 % | BODY MASS INDEX: 32.25 KG/M2 | RESPIRATION RATE: 17 BRPM | DIASTOLIC BLOOD PRESSURE: 88 MMHG

## 2020-10-19 DIAGNOSIS — R07.9 CHEST PAIN, UNSPECIFIED TYPE: Primary | ICD-10-CM

## 2020-10-19 DIAGNOSIS — R07.89 ATYPICAL CHEST PAIN: ICD-10-CM

## 2020-10-19 DIAGNOSIS — R94.31 T WAVE INVERSION IN EKG: ICD-10-CM

## 2020-10-19 DIAGNOSIS — J44.1 COPD EXACERBATION (HCC): ICD-10-CM

## 2020-10-19 DIAGNOSIS — I27.20 PULMONARY HYPERTENSION (HCC): ICD-10-CM

## 2020-10-19 DIAGNOSIS — R07.89 ATYPICAL CHEST PAIN: Primary | ICD-10-CM

## 2020-10-19 LAB
ALBUMIN SERPL-MCNC: 3.7 G/DL (ref 3.4–5)
ALBUMIN SERPL-MCNC: 3.7 G/DL (ref 3.4–5)
ALBUMIN/GLOB SERPL: 1.1 {RATIO} (ref 0.8–1.7)
ALBUMIN/GLOB SERPL: 1.1 {RATIO} (ref 0.8–1.7)
ALP SERPL-CCNC: 95 U/L (ref 45–117)
ALP SERPL-CCNC: 98 U/L (ref 45–117)
ALT SERPL-CCNC: 17 U/L (ref 13–56)
ALT SERPL-CCNC: 18 U/L (ref 13–56)
ANION GAP SERPL CALC-SCNC: 2 MMOL/L (ref 3–18)
ANION GAP SERPL CALC-SCNC: 6 MMOL/L (ref 3–18)
AST SERPL-CCNC: 12 U/L (ref 10–38)
AST SERPL-CCNC: 14 U/L (ref 10–38)
ATRIAL RATE: 71 BPM
ATRIAL RATE: 78 BPM
BASOPHILS # BLD: 0 K/UL (ref 0–0.1)
BASOPHILS # BLD: 0 K/UL (ref 0–0.1)
BASOPHILS NFR BLD: 0 % (ref 0–2)
BASOPHILS NFR BLD: 0 % (ref 0–2)
BILIRUB SERPL-MCNC: 0.2 MG/DL (ref 0.2–1)
BILIRUB SERPL-MCNC: 0.2 MG/DL (ref 0.2–1)
BNP SERPL-MCNC: 1649 PG/ML (ref 0–900)
BUN SERPL-MCNC: 17 MG/DL (ref 7–18)
BUN SERPL-MCNC: 18 MG/DL (ref 7–18)
BUN/CREAT SERPL: 15 (ref 12–20)
BUN/CREAT SERPL: 16 (ref 12–20)
CALCIUM SERPL-MCNC: 8.5 MG/DL (ref 8.5–10.1)
CALCIUM SERPL-MCNC: 8.7 MG/DL (ref 8.5–10.1)
CALCULATED P AXIS, ECG09: 70 DEGREES
CALCULATED P AXIS, ECG09: 73 DEGREES
CALCULATED R AXIS, ECG10: 16 DEGREES
CALCULATED R AXIS, ECG10: 48 DEGREES
CALCULATED T AXIS, ECG11: -9 DEGREES
CALCULATED T AXIS, ECG11: 3 DEGREES
CHLORIDE SERPL-SCNC: 105 MMOL/L (ref 100–111)
CHLORIDE SERPL-SCNC: 107 MMOL/L (ref 100–111)
CK MB CFR SERPL CALC: 2 % (ref 0–4)
CK MB CFR SERPL CALC: 2.1 % (ref 0–4)
CK MB SERPL-MCNC: 1.5 NG/ML (ref 5–25)
CK MB SERPL-MCNC: 1.6 NG/ML (ref 5–25)
CK SERPL-CCNC: 72 U/L (ref 26–192)
CK SERPL-CCNC: 80 U/L (ref 26–192)
CO2 SERPL-SCNC: 27 MMOL/L (ref 21–32)
CO2 SERPL-SCNC: 31 MMOL/L (ref 21–32)
CREAT SERPL-MCNC: 1.1 MG/DL (ref 0.6–1.3)
CREAT SERPL-MCNC: 1.11 MG/DL (ref 0.6–1.3)
DIAGNOSIS, 93000: NORMAL
DIAGNOSIS, 93000: NORMAL
DIFFERENTIAL METHOD BLD: ABNORMAL
DIFFERENTIAL METHOD BLD: ABNORMAL
ECHO LV INTERNAL DIMENSION DIASTOLIC: 4.07 CM (ref 3.9–5.3)
ECHO LV INTERNAL DIMENSION SYSTOLIC: 2.18 CM
ECHO LV IVSD: 1.58 CM (ref 0.6–0.9)
ECHO LV MASS 2D: 258.9 G (ref 67–162)
ECHO LV MASS INDEX 2D: 154.4 G/M2 (ref 43–95)
ECHO LV POSTERIOR WALL DIASTOLIC: 1.58 CM (ref 0.6–0.9)
ECHO LVOT PEAK GRADIENT: 7.79 MMHG
ECHO LVOT PEAK VELOCITY: 139.51 CM/S
ECHO LVOT VTI: 29.4 CM
ECHO TV REGURGITANT MAX VELOCITY: 205.83 CM/S
ECHO TV REGURGITANT MAX VELOCITY: 205.83 CM/S
ECHO TV REGURGITANT PEAK GRADIENT: 16.95 MMHG
ECHO TV REGURGITANT PEAK GRADIENT: 16.95 MMHG
EOSINOPHIL # BLD: 0.1 K/UL (ref 0–0.4)
EOSINOPHIL # BLD: 0.1 K/UL (ref 0–0.4)
EOSINOPHIL NFR BLD: 1 % (ref 0–5)
EOSINOPHIL NFR BLD: 2 % (ref 0–5)
ERYTHROCYTE [DISTWIDTH] IN BLOOD BY AUTOMATED COUNT: 13.2 % (ref 11.6–14.5)
ERYTHROCYTE [DISTWIDTH] IN BLOOD BY AUTOMATED COUNT: 13.3 % (ref 11.6–14.5)
GLOBULIN SER CALC-MCNC: 3.3 G/DL (ref 2–4)
GLOBULIN SER CALC-MCNC: 3.3 G/DL (ref 2–4)
GLUCOSE SERPL-MCNC: 82 MG/DL (ref 74–99)
GLUCOSE SERPL-MCNC: 82 MG/DL (ref 74–99)
HCT VFR BLD AUTO: 36.6 % (ref 35–45)
HCT VFR BLD AUTO: 37.5 % (ref 35–45)
HGB BLD-MCNC: 12.1 G/DL (ref 12–16)
HGB BLD-MCNC: 12.3 G/DL (ref 12–16)
LVOT MG: 5.03 MMHG
LYMPHOCYTES # BLD: 1.9 K/UL (ref 0.9–3.6)
LYMPHOCYTES # BLD: 1.9 K/UL (ref 0.9–3.6)
LYMPHOCYTES NFR BLD: 23 % (ref 21–52)
LYMPHOCYTES NFR BLD: 26 % (ref 21–52)
MCH RBC QN AUTO: 29.6 PG (ref 24–34)
MCH RBC QN AUTO: 29.7 PG (ref 24–34)
MCHC RBC AUTO-ENTMCNC: 32.8 G/DL (ref 31–37)
MCHC RBC AUTO-ENTMCNC: 33.1 G/DL (ref 31–37)
MCV RBC AUTO: 89.5 FL (ref 74–97)
MCV RBC AUTO: 90.6 FL (ref 74–97)
MONOCYTES # BLD: 0.4 K/UL (ref 0.05–1.2)
MONOCYTES # BLD: 0.5 K/UL (ref 0.05–1.2)
MONOCYTES NFR BLD: 5 % (ref 3–10)
MONOCYTES NFR BLD: 7 % (ref 3–10)
NEUTS SEG # BLD: 4.7 K/UL (ref 1.8–8)
NEUTS SEG # BLD: 6 K/UL (ref 1.8–8)
NEUTS SEG NFR BLD: 65 % (ref 40–73)
NEUTS SEG NFR BLD: 71 % (ref 40–73)
P-R INTERVAL, ECG05: 138 MS
P-R INTERVAL, ECG05: 142 MS
PLATELET # BLD AUTO: 247 K/UL (ref 135–420)
PLATELET # BLD AUTO: 252 K/UL (ref 135–420)
PMV BLD AUTO: 10.1 FL (ref 9.2–11.8)
PMV BLD AUTO: 10.1 FL (ref 9.2–11.8)
POTASSIUM SERPL-SCNC: 3.5 MMOL/L (ref 3.5–5.5)
POTASSIUM SERPL-SCNC: 3.8 MMOL/L (ref 3.5–5.5)
PROT SERPL-MCNC: 7 G/DL (ref 6.4–8.2)
PROT SERPL-MCNC: 7 G/DL (ref 6.4–8.2)
Q-T INTERVAL, ECG07: 412 MS
Q-T INTERVAL, ECG07: 462 MS
QRS DURATION, ECG06: 80 MS
QRS DURATION, ECG06: 86 MS
QTC CALCULATION (BEZET), ECG08: 469 MS
QTC CALCULATION (BEZET), ECG08: 502 MS
RBC # BLD AUTO: 4.09 M/UL (ref 4.2–5.3)
RBC # BLD AUTO: 4.14 M/UL (ref 4.2–5.3)
SODIUM SERPL-SCNC: 138 MMOL/L (ref 136–145)
SODIUM SERPL-SCNC: 140 MMOL/L (ref 136–145)
TROPONIN I SERPL-MCNC: <0.02 NG/ML (ref 0–0.04)
VENTRICULAR RATE, ECG03: 71 BPM
VENTRICULAR RATE, ECG03: 78 BPM
WBC # BLD AUTO: 7.2 K/UL (ref 4.6–13.2)
WBC # BLD AUTO: 8.5 K/UL (ref 4.6–13.2)

## 2020-10-19 PROCEDURE — 74011000250 HC RX REV CODE- 250: Performed by: EMERGENCY MEDICINE

## 2020-10-19 PROCEDURE — 93005 ELECTROCARDIOGRAM TRACING: CPT

## 2020-10-19 PROCEDURE — 74011250637 HC RX REV CODE- 250/637: Performed by: EMERGENCY MEDICINE

## 2020-10-19 PROCEDURE — 85025 COMPLETE CBC W/AUTO DIFF WBC: CPT

## 2020-10-19 PROCEDURE — 83880 ASSAY OF NATRIURETIC PEPTIDE: CPT

## 2020-10-19 PROCEDURE — 94640 AIRWAY INHALATION TREATMENT: CPT

## 2020-10-19 PROCEDURE — 82550 ASSAY OF CK (CPK): CPT

## 2020-10-19 PROCEDURE — 93308 TTE F-UP OR LMTD: CPT

## 2020-10-19 PROCEDURE — 96374 THER/PROPH/DIAG INJ IV PUSH: CPT

## 2020-10-19 PROCEDURE — 80053 COMPREHEN METABOLIC PANEL: CPT

## 2020-10-19 PROCEDURE — 84484 ASSAY OF TROPONIN QUANT: CPT

## 2020-10-19 PROCEDURE — 99285 EMERGENCY DEPT VISIT HI MDM: CPT

## 2020-10-19 PROCEDURE — 74011250636 HC RX REV CODE- 250/636: Performed by: EMERGENCY MEDICINE

## 2020-10-19 PROCEDURE — 71045 X-RAY EXAM CHEST 1 VIEW: CPT

## 2020-10-19 PROCEDURE — 99223 1ST HOSP IP/OBS HIGH 75: CPT | Performed by: INTERNAL MEDICINE

## 2020-10-19 RX ORDER — PREDNISONE 50 MG/1
50 TABLET ORAL DAILY
Qty: 5 TAB | Refills: 0 | Status: SHIPPED | OUTPATIENT
Start: 2020-10-19 | End: 2020-10-24

## 2020-10-19 RX ORDER — KETOROLAC TROMETHAMINE 15 MG/ML
15 INJECTION, SOLUTION INTRAMUSCULAR; INTRAVENOUS ONCE
Status: DISCONTINUED | OUTPATIENT
Start: 2020-10-19 | End: 2020-10-19 | Stop reason: HOSPADM

## 2020-10-19 RX ORDER — GUAIFENESIN 100 MG/5ML
324 LIQUID (ML) ORAL
Status: COMPLETED | OUTPATIENT
Start: 2020-10-19 | End: 2020-10-19

## 2020-10-19 RX ORDER — NITROGLYCERIN 0.4 MG/1
0.4 TABLET SUBLINGUAL
Status: COMPLETED | OUTPATIENT
Start: 2020-10-19 | End: 2020-10-19

## 2020-10-19 RX ORDER — NITROGLYCERIN 0.4 MG/1
0.4 TABLET SUBLINGUAL
Qty: 1 BOTTLE | Refills: 0 | Status: SHIPPED | OUTPATIENT
Start: 2020-10-19 | End: 2020-12-31 | Stop reason: SDUPTHER

## 2020-10-19 RX ORDER — IPRATROPIUM BROMIDE AND ALBUTEROL SULFATE 2.5; .5 MG/3ML; MG/3ML
3 SOLUTION RESPIRATORY (INHALATION) ONCE
Status: COMPLETED | OUTPATIENT
Start: 2020-10-19 | End: 2020-10-19

## 2020-10-19 RX ORDER — KETOROLAC TROMETHAMINE 15 MG/ML
10 INJECTION, SOLUTION INTRAMUSCULAR; INTRAVENOUS
Status: COMPLETED | OUTPATIENT
Start: 2020-10-19 | End: 2020-10-19

## 2020-10-19 RX ORDER — CLONIDINE HYDROCHLORIDE 0.2 MG/1
0.2 TABLET ORAL 2 TIMES DAILY
Qty: 60 TAB | Refills: 0 | Status: SHIPPED | OUTPATIENT
Start: 2020-10-19 | End: 2020-11-18

## 2020-10-19 RX ADMIN — IPRATROPIUM BROMIDE AND ALBUTEROL SULFATE 3 ML: .5; 3 SOLUTION RESPIRATORY (INHALATION) at 05:02

## 2020-10-19 RX ADMIN — NITROGLYCERIN 0.4 MG: 0.4 TABLET SUBLINGUAL at 05:03

## 2020-10-19 RX ADMIN — KETOROLAC TROMETHAMINE 10 MG: 15 INJECTION, SOLUTION INTRAMUSCULAR; INTRAVENOUS at 09:32

## 2020-10-19 RX ADMIN — ASPIRIN 324 MG: 81 TABLET, CHEWABLE ORAL at 05:03

## 2020-10-19 NOTE — ED NOTES
I have reviewed discharge instructions with the patient. The patient verbalized understanding. Discharged to the ED jp, ambulatory, in stable condition.   Will arrange Medicaid cab to transport patient home as per her request.

## 2020-10-19 NOTE — ED NOTES
Patient returns. She is a 58-year-old female past medical history of CHF respiratory failure CKD cocaine abuse COPD on supplemental oxygen depression drug-seeking behavior thyroid issues hypertension fibromyalgia reflux high cholesterol hypertension heart failure coronary disease respiratory failure who presents with pleuritic chest pain. Patient states she has continued pleuritic chest pain that hurts when she breathes since last time I saw her. No nausea no vomiting no diaphoresis nonexertional.  When I did see her I did the CTA results are as follows    IMPRESSION:  No evidence for pulmonary emboli. Mild bronchitis or central venous congestion. Main pulmonary artery is mildly enlarged as may be seen in pulmonary arterial  hypertension. Left atrium appears enlarged. Thyromegaly and multinodular goiter. Mild anasarca. We will send off basic labs EKG chest x-ray and reassess. ROS: neg unless in HPI    Exam: General; AOX3. Pulmonary; CTA-B. Cardiac: RRR no MRG; Abd S/NT/ND. Plan:      Patient had a cath in March which was clean. Bladder negative x2 patient did have some dynamic EKG changes that were concerning for ischemia although patient had a negative cardiac catheterization in March. The case with cardiology will get a bedside echo and have her follow-up outpatient. EF approximately 60 to 65% PA pressure 25    Dot the case with Dr. Bernardino Rodriguez from pulmonary this pulmonary artery enlargement is likely due to pulmonary hypertension. We will have her follow-up with pulmonary as well as cardiology as outpatient. Patient is feeling better she is requesting food will discharge    Patient requested to send in refills of clonidine and nitroglycerin. Patient states she is on clonidine 0.2 mg twice a day as well as nitroglycerin 0.4. She says she has been on that since she had a heart attack in 2006. We discussed her cardiac cath being negative she is going to follow-up with cardiology.

## 2020-10-19 NOTE — ED TRIAGE NOTES
The patient presents for evaluation of chronic back and rib cage pain. States, \"I came in at about 5:00 this morning. The doctor didn't see me, didn't give me any pain medicine and then discharged me. My back and ribs are still hurting. \"

## 2020-10-19 NOTE — ED NOTES
Went into patient room patient not in room, patient found walking around ED talking and laughing with staff. Not SOB or under distress.

## 2020-10-19 NOTE — ED TRIAGE NOTES
Pt arrived by ems for sudden onset of severe substernal chest pain. Pt has been aching and pressure radiating into her back. Pt did not take her sublingual nitro.  Pt was given 324 ASA PO.

## 2020-10-19 NOTE — CONSULTS
[facility] - Consult Note    Consultation request by No admitting provider for patient encounter. for advice/opinion related to evaluating No admission diagnoses are documented for this encounter. Date of  Admission: 10/19/2020  7:40 AM   Primary Care Physician:  Adonis Alejandra NP     Assessment:     -Chest pain- anterior changes that are mild when compared to prior EKG's. Will get limited echo and UDS as patient with prior history of substance abuse. -COPD- this appears to be another confounder for her visits. She is needing more aggressive treatments  -HTN that is mildly elevated  -Tobacco abuse  -Substance abuse history    No primary care physician and no primary cardiologist     Plan:     -Will obtain limited echo to assess wall function and pulmonary pressures  -Will also get urine drug screen as patient has history of cocaine abuse that could cause the EKG changes.  -Some of her main concerns are multifactorial and are related to her management of her COPD. She also has no follow up in any office.   -Will make more recommendations pending echo and labs. -Will follow. Discussed with Dr. Talia Anguiano, EKG does show EKG changes but pain has resolved. Possible spasm. Plan to check 2nd set troponin, limited echo, cocaine r/o. If troponin ok and no recurrence of pain, I anticipate discharge later today. I saw, examined, and evaluated the patient. I personally reviewed the patient's labs, tests, vitals, orders, medications, updated history, and other providers assessments. I personally agree with the findings as stated and the plan as documented. History of Present Illness: This is a 63 y.o. female admitted for No admission diagnoses are documented for this encounter. .    Patient complains of:  Patient who is well known to the ER and the hospital presented with chest pains that have been going on for several days. She has had similar episodes for the same, and currently is pain free. She has been needing to use her nebulizers regularly. No fevers, nausea, vomiting or other symptoms. Cardiac risk factors: family history, hypertension, substance abuse      Review of Symptoms:  Except as stated above include:  Constitutional:  negative  Respiratory:  negative  Cardiovascular:  negative  Gastrointestinal: negative  Genitourinary:  negative  Musculoskeletal:  Negative  Neurological:  Negative  Dermatological:  Negative  Endocrinological: Negative  Psychological:  Negative    Pertinent items are noted in HPI.      Past Medical History:     Past Medical History:   Diagnosis Date    CHF (congestive heart failure) ([address]     Chronic respiratory failure with hypoxia ([address] 9/7/2020    CKD (chronic kidney disease) stage 3, GFR 30-59 ml/min 10/31/2019    Cocaine abuse ([address] 11/26/2017    COPD (chronic obstructive pulmonary disease) with chronic bronchitis ([address] 12/19/2017    Dependence on supplemental oxygen     Depression 3/22/2020    Drug-seeking behavior 11/19/2016    Endocrine disease     thyroid issues    Essential hypertension 3/10/2017    Fe deficiency anemia 10/27/2012    Fibromyalgia 12/16/2016    Gastroesophageal reflux disease without esophagitis 10/10/2016    Gastrointestinal disorder     \"blockage in my stomach\"    Hypercholesterolemia     Hypertension     Hypertensive heart failure ([address] 12/19/2017    Morbid obesity due to excess calories ([address] 3/10/2017    NSTEMI (non-ST elevated myocardial infarction) ([address] 3/22/2020    On home O2 12/3/2015    Overview:  2L at home per pt for approx 8 years    Polysubstance abuse ([address] 9/15/2018    Respiratory failure ([address] 1/11/2017    S/P hernia repair 10/31/2019    Sleep apnea 12/19/2017    T wave inversion in EKG 3/9/2019    Tobacco use 2/71/9960    Uncomplicated severe persistent asthma 12/13/2016    Vocal cord disease 12/7/2016         Social History:     Social History     Socioeconomic History    Marital status: SINGLE Spouse name: Not on file    Number of children: Not on file    Years of education: Not on file    Highest education level: Not on file   Tobacco Use    Smoking status: Current Every Day Smoker     Packs/day: 0.25    Smokeless tobacco: Current User   Substance and Sexual Activity    Alcohol use: No     Comment: socially    Drug use: Not Currently     Types: Heroin    Sexual activity: Not Currently     Comment: last used 9 years ago        Family History:   History reviewed. No pertinent family history. Medications:   No Known Allergies     No current facility-administered medications for this encounter. Current Outpatient Medications   Medication Sig    naproxen (Naprosyn) 500 mg tablet Take 1 Tab by mouth two (2) times daily (with meals) for 10 days.  albuterol sulfate 90 mcg/actuation aebs Take 2 Puffs by inhalation every four to six (4-6) hours as needed for Wheezing for up to 30 days.  albuterol-ipratropium (DUO-NEB) 2.5 mg-0.5 mg/3 ml nebu 3 mL by Nebulization route every four (4) hours as needed for Wheezing, Shortness of Breath or Cough.  arformoteroL (BROVANA) 15 mcg/2 mL nebu neb solution 2 mL by Nebulization route two (2) times a day. Indications: bronchospasm prevention with COPD    budesonide (PULMICORT) 0.5 mg/2 mL nbsp 2 mL by Nebulization route two (2) times a day.  fluticasone furoate-vilanteroL (Breo Ellipta) 200-25 mcg/dose inhaler Take 1 Puff by inhalation daily.  citalopram (CeleXA) 20 mg tablet 1 tablet    gemfibroziL (LOPID) 600 mg tablet 1 tablet    sucralfate (CARAFATE) 100 mg/mL suspension Take 1,000 mg by mouth.  QUEtiapine (SEROquel) 50 mg tablet TAKE 1 TABLET BY MOUTH AT BEDTIME FOR MOOD    ALPRAZolam (Xanax) 0.5 mg tablet Take 1 Tab by mouth every eight (8) hours as needed for Anxiety. Max Daily Amount: 1.5 mg.    aspirin delayed-release 81 mg tablet Take 1 Tab by mouth daily.  famotidine (PEPCID) 20 mg tablet Take 1 Tab by mouth daily.     OXYGEN-AIR DELIVERY SYSTEMS 3 L by IntraNASal route as needed for Other (sob).  docusate sodium (COLACE) 100 mg capsule Take 1 Cap by mouth two (2) times a day.  atorvastatin (LIPITOR) 20 mg tablet Take 1 Tab by mouth nightly.  roflumilast (DALIRESP) 500 mcg tab tablet Take 1 Tab by mouth daily.  naloxone (NARCAN) 4 mg/actuation nasal spray Use 1 spray intranasally, then discard. Repeat with new spray every 2 min as needed for opioid overdose symptoms, alternating nostrils.  montelukast (SINGULAIR) 10 mg tablet Take 1 Tab by mouth nightly. Physical Exam:     Visit Vitals  BP (!) 144/88 (BP 1 Location: Right arm, BP Patient Position: At rest)   Pulse 73   Temp 98.3 °F (36.8 °C)   Resp 14   Ht 4' 11\" (1.499 m)   Wt 72.6 kg (160 lb)   SpO2 100%   BMI 32.32 kg/m²     BP Readings from Last 3 Encounters:   10/19/20 (!) 144/88   10/19/20 (!) 141/73   10/11/20 (!) 140/80     Pulse Readings from Last 3 Encounters:   10/19/20 73   10/19/20 77   10/11/20 76     Wt Readings from Last 3 Encounters:   10/19/20 72.6 kg (160 lb)   10/19/20 72.6 kg (160 lb)   10/11/20 70.8 kg (156 lb)       General:  alert, cooperative, no distress, appears stated age  Neck:  nontender, no JVD  Lungs:  Tight with wheeze noted. No rales  Heart:  regular rate and rhythm, S1, S2 normal, no murmur, click, rub or gallop  Abdomen:  abdomen is soft without significant tenderness, masses, organomegaly or guarding  Extremities:  extremities normal, atraumatic, no cyanosis or edema  Skin: Warm and dry.  no hyperpigmentation, vitiligo, or suspicious lesions  Neuro: alert, oriented x3, affect appropriate, no focal neurological deficits, moves all extremities well, no involuntary movements  Psych: non focal     Data Review:     Recent Labs     10/19/20  0925 10/19/20  0525   WBC 7.2 8.5   HGB 12.1 12.3   HCT 36.6 37.5    252     Recent Labs     10/19/20  0925 10/19/20  0525    138   K 3.8 3.5    105   CO2 31 27   GLU 82 82   BUN 17 18   CREA 1.10 1.11   CA 8.5 8.7   ALB 3.7 3.7   ALT 17 18       Results for orders placed or performed during the hospital encounter of 10/19/20   EKG, 12 LEAD, INITIAL   Result Value Ref Range    Ventricular Rate 71 BPM    Atrial Rate 71 BPM    P-R Interval 138 ms    QRS Duration 80 ms    Q-T Interval 462 ms    QTC Calculation (Bezet) 502 ms    Calculated P Axis 73 degrees    Calculated R Axis 48 degrees    Calculated T Axis 3 degrees    Diagnosis       Normal sinus rhythm  Possible Left atrial enlargement  Anterior infarct (cited on or before 19-OCT-2020)  Prolonged QT  Abnormal ECG  When compared with ECG of 19-OCT-2020 05:26,  No significant change was found         All Cardiac Markers in the last 24 hours:    Lab Results   Component Value Date/Time    CPK 80 10/19/2020 09:25 AM    CKMB 1.6 10/19/2020 09:25 AM    CKND1 2.0 10/19/2020 09:25 AM    TROIQ <0.02 10/19/2020 09:25 AM       Last Lipid:  No results found for: CHOL, CHOLX, CHLST, CHOLV, HDL, HDLP, LDL, LDLC, DLDLP, TGLX, TRIGL, TRIGP, CHHD, CHHDX    Signed By: DARCIE Nelson     October 19, 2020

## 2020-10-19 NOTE — ED NOTES
Patient refused to change into hospital gown. States, \"I'm going to call the media about this. They're not doing anything to help me feel better. \"

## 2020-10-19 NOTE — DISCHARGE INSTRUCTIONS
Patient Education     Chest Pain (Angina): After Your Visit to the Emergency Room  Your Care Instructions  You have chest pain called angina because at times your heart does not get enough blood. This is caused by coronary artery disease (CAD). It causes major blood vessels to the heart to become narrow or blocked. You did not have a heart attack, but CAD does increase your risk for a heart attack. Even though you have been released from the emergency room, you still need to watch for any problems. The doctor carefully checked you. But sometimes problems can develop later. A visit to the emergency room is only one step in your treatment. Even if you feel better, you still need to do what your doctor recommends, such as going to all suggested follow-up appointments and taking medicines exactly as directed. This will help you recover and help prevent future problems. How can you care for yourself at home? · If your doctor has given you nitroglycerin or other nitrate medicine, keep it with you at all times. If you have chest pain, sit down and rest, and take your nitroglycerin or other nitrates as directed. If your chest pain does not get better after 5 minutes, call 911. · Take your medicine exactly as prescribed. Call your doctor if you think you are having a problem with your medicine. When should you call for help? Call 911 if:  · You passed out (lost consciousness). · You have chest pain that does not go away with rest or is not getting better within 5 minutes after you take a dose of nitroglycerin. · You have symptoms of a heart attack. These may include:  ¨ Chest pain or pressure, or a strange feeling in the chest.  ¨ Sweating. ¨ Shortness of breath. ¨ Nausea or vomiting. ¨ Pain, pressure, or a strange feeling in the back, neck, jaw, or upper belly or in one or both shoulders or arms. ¨ Lightheadedness or sudden weakness. ¨ A fast or irregular pulse.   After you call 911, the  may tell you to chew 1 adult-strength or 2 to 4 low-dose aspirin. Wait for an ambulance. Do not try to drive yourself. · You have other symptoms that you think are a medical emergency. Return to the emergency room now if:  · You are having chest pain more often than usual, even if it goes away when you rest or take nitroglycerin. · You feel dizzy or lightheaded, or you feel like you may faint. Where can you learn more? Go to [employer]  Enter Z719 in the search box to learn more about \"Chest Pain (Angina): After Your Visit to the Emergency Room. \"   © 2213-0435 [employer]. Care instructions adapted under license by [facility] (which disclaims liability or warranty for this information). This care instruction is for use with your licensed healthcare professional. If you have questions about a medical condition or this instruction, always ask your healthcare professional. [facility] [address] any warranty or liability for your use of this information. Content Version: 9.4.70536;  Last Revised: February 23, 2012

## 2020-10-19 NOTE — ED NOTES
Attempted IV access to left AC and hand, but were unsuccessful. Dr. Haider Pitkin aware. He will attempt US guided IV access.

## 2020-10-19 NOTE — ED NOTES
Patient removed bedside monitoring device independently and refused for me to place them back on her. She is requesting for food. States, \"I've been here since 5:00 and I haven't had anything to eat. \"  Dr. Ana Brittle aware. Awaiting diagnostic test results prior to giving patient anything PO. Explanation of plan of care provided to the patient. Patient verbalized understanding.

## 2020-10-19 NOTE — ED PROVIDER NOTES
EMERGENCY DEPARTMENT HISTORY AND PHYSICAL EXAM    4:59 AM  Date: 10/19/2020  Patient Name: James Guardado    History of Presenting Illness     Chief Complaint   Patient presents with    Chest Pain        History Provided By: Patient    HPI: James Guardado is a 63 y.o. female with history of multiple medical problems as below including chronic chest pain and drug-seeking behavior. Patient is presenting with chest pain for the past week since she has been discharged from the hospital.  Denies shortness of breath. No history of fever chills or cough. No history of sick contacts. Pain is similar to her prior episodes. Patient reports that she ran out of her nitroglycerin. Location:  Severity:  Timing/course:    Onset/Duration:     PCP: Shelbie Cushing, NP    Past History     Past Medical History:  Past Medical History:   Diagnosis Date    CHF (congestive heart failure) ([address]     Chronic respiratory failure with hypoxia ([address] 9/7/2020    CKD (chronic kidney disease) stage 3, GFR 30-59 ml/min 10/31/2019    Cocaine abuse ([address] 11/26/2017    COPD (chronic obstructive pulmonary disease) with chronic bronchitis ([address] 12/19/2017    Dependence on supplemental oxygen     Depression 3/22/2020    Drug-seeking behavior 11/19/2016    Endocrine disease     thyroid issues    Essential hypertension 3/10/2017    Fe deficiency anemia 10/27/2012    Fibromyalgia 12/16/2016    Gastroesophageal reflux disease without esophagitis 10/10/2016    Gastrointestinal disorder     \"blockage in my stomach\"    Hypercholesterolemia     Hypertension     Hypertensive heart failure ([address] 12/19/2017    Morbid obesity due to excess calories ([address] 3/10/2017    NSTEMI (non-ST elevated myocardial infarction) ([address] 3/22/2020    On home O2 12/3/2015    Overview:  2L at home per pt for approx 8 years    Polysubstance abuse ([address] 9/15/2018    Respiratory failure ([address] 1/11/2017    S/P hernia repair 10/31/2019    Sleep apnea 12/19/2017    T wave inversion in EKG 3/9/2019    Tobacco use 6/61/6587    Uncomplicated severe persistent asthma 12/13/2016    Vocal cord disease 12/7/2016       Past Surgical History:  Past Surgical History:   Procedure Laterality Date    HX GI      Exploratory laparotomy with lysis of adhesions and primary repair of incarcerated umbilical hernia       Family History:  History reviewed. No pertinent family history. Social History:  Social History     Tobacco Use    Smoking status: Current Every Day Smoker     Packs/day: 0.25    Smokeless tobacco: Current User   Substance Use Topics    Alcohol use: No     Comment: socially    Drug use: Not Currently     Types: Heroin       Allergies:  No Known Allergies    Review of Systems   Review of Systems   Cardiovascular: Positive for chest pain. All other systems reviewed and are negative. Physical Exam     Patient Vitals for the past 12 hrs:   Temp Pulse Resp BP SpO2   10/19/20 0422 98.4 °F (36.9 °C) 77 14 (!) 141/73 96 %       Physical Exam  Vitals signs and nursing note reviewed. Constitutional:       Appearance: She is well-developed. HENT:      Head: Normocephalic and atraumatic. Eyes:      Extraocular Movements: Extraocular movements intact. Neck:      Musculoskeletal: Normal range of motion and neck supple. Cardiovascular:      Rate and Rhythm: Normal rate. Pulmonary:      Effort: Pulmonary effort is normal. No respiratory distress. Musculoskeletal: Normal range of motion. Neurological:      General: No focal deficit present. Mental Status: She is alert and oriented to person, place, and time. Psychiatric:         Mood and Affect: Mood normal.         Behavior: Behavior normal.         Diagnostic Study Results     Labs -  No results found for this or any previous visit (from the past 12 hour(s)). Radiologic Studies -   No results found.       Medical Decision Making     ED Course: Progress Notes, Reevaluation, and Consults:    4:59 AM Initial assessment performed. The patients presenting problems have been discussed, and they/their family are in agreement with the care plan formulated and outlined with them. I have encouraged them to ask questions as they arise throughout their visit. Provider Notes (Medical Decision Making): 58-year-old female presenting with recurrent episode of chest pain. Pain has been persistent for over a week. She is well-appearing on exam and not in distress. Patient was initially agitated and rolling around the ER asking for pain medications. I evaluated her and told her we will give her some nitroglycerin then reevaluate her condition. Her EKG and blood work are all within the baseline, troponin is negative. Patient has been extensively worked up recently including a CTA for PE and it was negative. I think this is related to her chronic pain issues and I am not concerned about an acute cardiac or pulmonary pathology. Procedures:     Critical Care Time:     Vital Signs-Reviewed the patient's vital signs. Reviewed pt's pulse ox reading. EKG: Interpreted by the EP. Time Interpreted:    Rate:    Rhythm:    Interpretation:   Comparison:     Records Reviewed: Nursing Notes, Old Medical Records, Previous electrocardiograms and Previous Radiology Studies (Time of Review: 4:59 AM)  -I am the first provider for this patient.  -I reviewed the vital signs, available nursing notes, past medical history, past surgical history, family history and social history.     Current Facility-Administered Medications   Medication Dose Route Frequency Provider Last Rate Last Dose    nitroglycerin (NITROSTAT) tablet 0.4 mg  0.4 mg SubLINGual NOW Nick Zarate MD        aspirin chewable tablet 324 mg  324 mg Oral NOW Nick Zarate MD        albuterol-ipratropium (DUO-NEB) 2.5 MG-0.5 MG/3 ML  3 mL Nebulization ONCE Nick Zarate MD         Current Outpatient Medications   Medication Sig Dispense Refill    naproxen (Naprosyn) 500 mg tablet Take 1 Tab by mouth two (2) times daily (with meals) for 10 days. 20 Tab 0    albuterol sulfate 90 mcg/actuation aebs Take 2 Puffs by inhalation every four to six (4-6) hours as needed for Wheezing for up to 30 days. 1 Inhaler 0    albuterol-ipratropium (DUO-NEB) 2.5 mg-0.5 mg/3 ml nebu 3 mL by Nebulization route every four (4) hours as needed for Wheezing, Shortness of Breath or Cough. 30 Nebule 0    arformoteroL (BROVANA) 15 mcg/2 mL nebu neb solution 2 mL by Nebulization route two (2) times a day. Indications: bronchospasm prevention with COPD 60 Vial 0    budesonide (PULMICORT) 0.5 mg/2 mL nbsp 2 mL by Nebulization route two (2) times a day. 60 Each 0    fluticasone furoate-vilanteroL (Breo Ellipta) 200-25 mcg/dose inhaler Take 1 Puff by inhalation daily. 1 Inhaler 0    citalopram (CeleXA) 20 mg tablet 1 tablet      gemfibroziL (LOPID) 600 mg tablet 1 tablet      sucralfate (CARAFATE) 100 mg/mL suspension Take 1,000 mg by mouth.  QUEtiapine (SEROquel) 50 mg tablet TAKE 1 TABLET BY MOUTH AT BEDTIME FOR MOOD      ALPRAZolam (Xanax) 0.5 mg tablet Take 1 Tab by mouth every eight (8) hours as needed for Anxiety. Max Daily Amount: 1.5 mg. 20 Tab 0    aspirin delayed-release 81 mg tablet Take 1 Tab by mouth daily. 30 Tab 0    famotidine (PEPCID) 20 mg tablet Take 1 Tab by mouth daily. 30 Tab 1    OXYGEN-AIR DELIVERY SYSTEMS 3 L by IntraNASal route as needed for Other (sob).  docusate sodium (COLACE) 100 mg capsule Take 1 Cap by mouth two (2) times a day. 120 Cap 0    atorvastatin (LIPITOR) 20 mg tablet Take 1 Tab by mouth nightly. 30 Tab 0    roflumilast (DALIRESP) 500 mcg tab tablet Take 1 Tab by mouth daily. 30 Tab 0    naloxone (NARCAN) 4 mg/actuation nasal spray Use 1 spray intranasally, then discard. Repeat with new spray every 2 min as needed for opioid overdose symptoms, alternating nostrils.  1 Each 0    montelukast (SINGULAIR) 10 mg tablet Take 1 Tab by mouth nightly. 30 Tab 1        Clinical Impression     Clinical Impression: No diagnosis found. Disposition: DC      DISCHARGE NOTE:     Pt has been reexamined. Patient has no new complaints, changes, or physical findings. Care plan outlined and precautions discussed. Results of labs and imaging were reviewed with the patient. All medications were reviewed with the patient; will d/c home with return precautions. All of pt's questions and concerns were addressed. Patient was instructed and agrees to follow up with PCP, as well as to return to the ED upon further deterioration. Patient is ready to go home. This note was dictated utilizing voice recognition software which may lead to typographical errors. I apologize in advance if the situation occurs. If questions arise please do not hesitate to contact me or call our department.     Nick Broderick MD  4:59 AM

## 2020-10-19 NOTE — DISCHARGE INSTRUCTIONS
Patient Education        Using a Metered-Dose Inhaler: Care Instructions  Your Care Instructions     A metered-dose inhaler lets you breathe medicine into your lungs quickly. Inhaled medicine works faster than the same medicine in a pill. An inhaler allows you to take less medicine than you would need if you took it as a pill. \"Metered-dose\" means that the inhaler gives a measured amount of medicine each time you use it. A metered-dose inhaler gives medicine in the form of a liquid mist.  Your doctor may want you to use a spacer with your inhaler. A spacer is a chamber that you attach to the inhaler. The chamber holds the medicine before you inhale it. That way, you can inhale the medicine in as many breaths as you need. Doctors recommend using a spacer with most metered-dose inhalers, especially those with corticosteroid medicines. Follow-up care is a key part of your treatment and safety. Be sure to make and go to all appointments, and call your doctor if you are having problems. It's also a good idea to know your test results and keep a list of the medicines you take. How can you care for yourself at home? To get started  · Talk with your health care provider to be sure you are using your inhaler the right way. It might help if you practice using it in front of a mirror. Use the inhaler exactly as prescribed. · Check that you have the correct medicine. If you use more than one inhaler, put a label on each one. This will let you know which one to use at the right time. · Keep track of how much medicine is in the inhaler. Check the label to see how many doses are in the container. If you know how many puffs you can take, you can replace the inhaler before you run out. Ask your health care provider how you can keep track of how much medicine is left. · Talk to your health care provider about using a spacer with your inhaler. Spacers make it easier to get the medicine into your lungs.  You may need a spacer if you are using corticosteroid medicines. A spacer can also help if you have problems pressing the inhaler and breathing in at the same time. · If you are using a corticosteroid inhaler, gargle and rinse out your mouth with water after use. Do not swallow the water. Swallowing the water will increase the chance that the medicine will get into your bloodstream. This may make it more likely that you will have side effects. To use a spacer with an inhaler  1. Shake the inhaler. Remove the inhaler cap, and place the mouthpiece of the inhaler into the spacer. Check the inhaler instructions to see if you need to prime your inhaler before you use it. If it needs priming, follow the instructions on how to prime your inhaler. 2. Remove the cap from the spacer. 3. Hold the inhaler upright with the mouthpiece at the bottom. 4. Tilt your head back a little, and breathe out slowly and completely. 5. Place the spacer's mouthpiece in your mouth. 6. Press down on the inhaler to spray one puff of medicine into the spacer, and then start breathing in slowly. Wait to inhale until after you have pressed down on the inhaler. Some spacers have a whistle. If you hear it, you should breathe in more slowly. 7. Hold your breath for 10 seconds. This will let the medicine settle in your lungs. 8. If you need to take a second dose, wait 30 to 60 seconds to allow the inhaler valve to refill. To use an inhaler without a spacer  1. Shake the inhaler as directed. Remove the cap. Check the instructions to see if you need to prime your inhaler before you use it. If it needs priming, follow the instructions on how to prime your inhaler. 2. Hold the inhaler upright with the mouthpiece at the bottom. 3. Tilt your head back a little, and breathe out slowly and completely. 4. Position the inhaler in one of two ways:  ? You can place the inhaler in your mouth. This is easier for most people.  And it lowers the risk that any of the medicine will get into your eyes. ? Or you can place the inhaler 1 to 2 inches in front of your open mouth, without closing your lips over it. Try to open your mouth as wide as you can. Placing the inhaler in front of your open mouth may be better for getting the medicine into your lungs. But some people may find this too hard to do. 5. Start taking slow, even breaths through your mouth. Press down on the inhaler once, then inhale fully. 6. Hold your breath for 10 seconds. This will let the medicine settle in your lungs. 7. If you need to take a second dose, wait 30 to 60 seconds to allow the inhaler valve to refill. Where can you learn more? Go to http://www.ash.com/  Enter K111 in the search box to learn more about \"Using a Metered-Dose Inhaler: Care Instructions. \"  Current as of: February 24, 2020               Content Version: 12.6  © 5382-7023 [employer]. Care instructions adapted under license by [employer] (which disclaims liability or warranty for this information). If you have questions about a medical condition or this instruction, always ask your healthcare professional. [place] [address] any warranty or liability for your use of this information. Patient Education        Chronic Obstructive Pulmonary Disease (COPD): Care Instructions  Your Care Instructions     Chronic obstructive pulmonary disease (COPD) is a general term for a group of lung diseases, including emphysema and chronic bronchitis. People with COPD have decreased airflow in and out of the lungs, which makes it hard to breathe. The airways also can get clogged with thick mucus. Cigarette smoking is a major cause of COPD. Although there is no cure for COPD, you can slow its progress. Following your treatment plan and taking care of yourself can help you feel better and live longer. Follow-up care is a key part of your treatment and safety.  Be sure to make and go to all appointments, and call your doctor if you are having problems. It's also a good idea to know your test results and keep a list of the medicines you take. How can you care for yourself at home? Staying healthy    · Do not smoke. This is the most important step you can take to prevent more damage to your lungs. If you need help quitting, talk to your doctor about stop-smoking programs and medicines. These can increase your chances of quitting for good.     · Avoid colds and flu. Get a pneumococcal vaccine shot. If you have had one before, ask your doctor whether you need a second dose. Get the flu vaccine every fall. If you must be around people with colds or the flu, wash your hands often.     · Avoid secondhand smoke, air pollution, and high altitudes. Also avoid cold, dry air and hot, humid air. Stay at home with your windows closed when air pollution is bad. Medicines and oxygen therapy    · Take your medicines exactly as prescribed. Call your doctor if you think you are having a problem with your medicine. You may be taking medicines such as:  ? Bronchodilators. These help open your airways and make breathing easier. They are either short-acting (work for 6 to 9 hours) or long-acting (work for 24 hours). You inhale most bronchodilators, so they start to act quickly. Always carry your quick-relief inhaler with you in case you need it while you are away from home. ? Corticosteroids (prednisone, budesonide). These reduce airway inflammation. They come in pill or inhaled form. You must take these medicines every day for them to work well.     · Ask your doctor or pharmacist if a spacer is right for you. A spacer may help you get more inhaled medicine to your lungs. If you use one, ask how to use it properly.     · Do not take any vitamins, over-the-counter medicine, or herbal products without talking to your doctor first.     · If your doctor prescribed antibiotics, take them as directed.  Do not stop taking them just because you feel better. You need to take the full course of antibiotics.     · If you use oxygen therapy, use the flow rate your doctor has recommended. Don't change it without talking to your doctor first. Oxygen therapy boosts the amount of oxygen in your blood and helps you breathe easier. Activity    · Get regular exercise. Walking is an easy way to get exercise. Start out slowly, and walk a little more each day.     · Pay attention to your breathing. You are exercising too hard if you can't talk while you exercise.     · Take short rest breaks when doing household chores and other activities.     · Learn breathing methods--such as breathing through pursed lips--to help you become less short of breath.     · If your doctor has not set you up with a pulmonary rehabilitation program, ask if rehab is right for you. Rehab includes exercise programs, education about your disease and how to manage it, help with diet and other changes, and emotional support. Diet    · Eat regular, healthy meals. Use bronchodilators about 1 hour before you eat to make it easier to eat. Eat several small meals instead of three large ones. Drink beverages at the end of the meal. Avoid foods that are hard to chew.     · Eat foods that contain protein so you don't lose muscle mass.     · Talk with your doctor if you gain too much weight or if you lose weight without trying. Mental health    · Talk to your family, friends, or a therapist about your feelings. Some people feel frightened, angry, hopeless, helpless, and even guilty. Talking openly about bad feelings can help you cope. If these feelings last, talk to your doctor. When should you call for help? Call 911 anytime you think you may need emergency care. For example, call if:    · You have severe trouble breathing. Call your doctor now or seek immediate medical care if:    · You have new or worse trouble breathing.     · You cough up blood.     · You have a fever. Watch closely for changes in your health, and be sure to contact your doctor if:    · You cough more deeply or more often, especially if you notice more mucus or a change in the color of your mucus.     · You have new or worse swelling in your legs or belly.     · You are not getting better as expected. Where can you learn more? Go to http://www.gray.com/  Enter T538 in the search box to learn more about \"Chronic Obstructive Pulmonary Disease (COPD): Care Instructions. \"  Current as of: February 24, 2020               Content Version: 12.6  © 1121-7772 [employer]. Care instructions adapted under license by [employer] (which disclaims liability or warranty for this information). If you have questions about a medical condition or this instruction, always ask your healthcare professional. [place] [address] any warranty or liability for your use of this information. Patient Education        Pulmonary Hypertension: Care Instructions  Your Care Instructions  Pulmonary hypertension is high blood pressure in the arteries of your lungs. These blood vessels carry blood from the heart to the lungs, where the blood picks up oxygen. The walls of the arteries may get thick, and the arteries may get narrow. When this happens, blood does not flow as well as it should. Pressure builds up in the arteries. Then your heart has to work harder to pump blood through your lungs. There are different types of pulmonary hypertension. They are caused by different things. Causes include other health conditions such as heart or lung problems. Sometimes it can happen without a known cause. When you have this condition, your body gets less oxygen from your blood. This causes symptoms such as shortness of breath and feeling tired, faint, or dizzy. Over time, these symptoms may change or get worse if your heart gets weaker. You may get heart failure.  Heart failure means your heart doesn't pump as much blood as your body needs. Treatment can help you feel better and live longer. Your treatment options will depend on the type of pulmonary hypertension you have. It can be hard to learn that you have a problem with your lungs and heart. But there are things you can do to feel better and stay as active as you can. Follow-up care is a key part of your treatment and safety. Be sure to make and go to all appointments, and call your doctor if you are having problems. It's also a good idea to know your test results and keep a list of the medicines you take. How can you care for yourself at home? Medicine    · Be safe with medicines. Take your medicines exactly as prescribed. Call your doctor if you think you are having a problem with your medicine. You will get more details on the specific medicines your doctor prescribes.     · Your doctor may prescribe oxygen therapy. You will get more details on how to use it.     · Talk to your doctor before you take any vitamins, over-the-counter medicine, or herbal products. Don't take ibuprofen (Advil or Motrin) and naproxen (Aleve) without talking to your doctor first.     · If you take a blood thinner, be sure to get instructions about how to take your medicine safely. Blood thinners can cause serious bleeding problems. Activity    · Be as active as you can. Talk to your doctor about making a plan before you start a new activity.     · Ask your doctor if a pulmonary rehabilitation program is right for you.     · Learn how to save your energy. Making small changes in daily activities can make a big impact on how you feel. Staying healthy    · Eat healthy foods, and try to stay at a healthy weight.     · Do not smoke. Smoking can make this condition worse. If you need help quitting, talk to your doctor about stop-smoking programs and medicines.  These can increase your chances of quitting for good.     · Talk to your doctor about preventing pregnancy. You may need to take steps to avoid becoming pregnant. Pregnancy and childbirth can cause changes in the body that could be life-threatening for women who have this condition.     · Avoid colds and flu. Get a pneumococcal vaccine. If you have had one before, ask your doctor if you need another dose. Get a flu vaccine every year. If you must be around people with colds or flu, wash your hands often.     · Talk with your doctor before traveling. Your doctor may suggest that you use oxygen if you fly or visit a place that is at high altitude. Follow the instructions on how to use oxygen safely. When should you call for help? Call 911 anytime you think you may need emergency care. For example, call if:    · You have symptoms of sudden heart failure. These may include:  ? Severe trouble breathing. ? A fast or irregular heartbeat. ? Coughing up pink, foamy mucus. ? Passing out. Call your doctor now or seek immediate medical care if:    · You have new or changed symptoms of heart failure, such as:  ? New or increased shortness of breath. ? New or worse swelling in your legs, ankles, or feet. ? Sudden weight gain, such as more than 2 to 3 pounds in a day or 5 pounds in a week. (Your doctor may suggest a different range of weight gain.)  ? Feeling dizzy or lightheaded or like you may faint. ? Feeling so tired or weak that you cannot do your usual activities. ? Not sleeping well. Shortness of breath wakes you at night. You need extra pillows to prop yourself up to breathe easier. Watch closely for changes in your health, and be sure to contact your doctor if:    · You have new or worse symptoms. Where can you learn more? Go to http://www.gray.com/  Enter G450 in the search box to learn more about \"Pulmonary Hypertension: Care Instructions. \"  Current as of: December 16, 2019               Content Version: 12.6  © 7924-2424 [employer], [facility].    Care instructions adapted under license by [employer] (which disclaims liability or warranty for this information). If you have questions about a medical condition or this instruction, always ask your healthcare professional. [address] any warranty or liability for your use of this information. Patient Education        Chest Pain: Care Instructions  Your Care Instructions     There are many things that can cause chest pain. Some are not serious and will get better on their own in a few days. But some kinds of chest pain need more testing and treatment. Your doctor may have recommended a follow-up visit in the next 8 to 12 hours. If you are not getting better, you may need more tests or treatment. Even though your doctor has released you, you still need to watch for any problems. The doctor carefully checked you, but sometimes problems can develop later. If you have new symptoms or if your symptoms do not get better, get medical care right away. If you have worse or different chest pain or pressure that lasts more than 5 minutes or you passed out (lost consciousness), call 911 or seek other emergency help right away. A medical visit is only one step in your treatment. Even if you feel better, you still need to do what your doctor recommends, such as going to all suggested follow-up appointments and taking medicines exactly as directed. This will help you recover and help prevent future problems. How can you care for yourself at home? · Rest until you feel better. · Take your medicine exactly as prescribed. Call your doctor if you think you are having a problem with your medicine. · Do not drive after taking a prescription pain medicine. When should you call for help? Call 911 if:     · You passed out (lost consciousness).     · You have severe difficulty breathing.     · You have symptoms of a heart attack. These may include:  ?  Chest pain or pressure, or a strange feeling in your chest.  ? Sweating. ? Shortness of breath. ? Nausea or vomiting. ? Pain, pressure, or a strange feeling in your back, neck, jaw, or upper belly or in one or both shoulders or arms. ? Lightheadedness or sudden weakness. ? A fast or irregular heartbeat. After you call 911, the  may tell you to chew 1 adult-strength or 2 to 4 low-dose aspirin. Wait for an ambulance. Do not try to drive yourself. Call your doctor today if:     · You have any trouble breathing.     · Your chest pain gets worse.     · You are dizzy or lightheaded, or you feel like you may faint.     · You are not getting better as expected.     · You are having new or different chest pain. Where can you learn more? Go to http://www.ash.com/  Enter A120 in the search box to learn more about \"Chest Pain: Care Instructions. \"  Current as of: June 26, 2019               Content Version: 12.6  © 2440-9073 [employer]. Care instructions adapted under license by [employer] (which disclaims liability or warranty for this information). If you have questions about a medical condition or this instruction, always ask your healthcare professional. [address] any warranty or liability for your use of this information.

## 2020-11-10 ENCOUNTER — APPOINTMENT (OUTPATIENT)
Dept: GENERAL RADIOLOGY | Age: 55
End: 2020-11-10
Attending: EMERGENCY MEDICINE
Payer: MEDICAID

## 2020-11-10 ENCOUNTER — HOSPITAL ENCOUNTER (EMERGENCY)
Dept: GENERAL RADIOLOGY | Age: 55
Discharge: HOME | End: 2020-11-10
Attending: PHYSICIAN ASSISTANT
Payer: MEDICAID

## 2020-11-10 ENCOUNTER — HOSPITAL ENCOUNTER (EMERGENCY)
Age: 55
Discharge: HOME | End: 2020-11-10
Attending: EMERGENCY MEDICINE | Admitting: EMERGENCY MEDICINE
Payer: MEDICAID

## 2020-11-10 VITALS
SYSTOLIC BLOOD PRESSURE: 156 MMHG | TEMPERATURE: 98.7 F | HEART RATE: 84 BPM | DIASTOLIC BLOOD PRESSURE: 90 MMHG | OXYGEN SATURATION: 96 % | RESPIRATION RATE: 17 BRPM

## 2020-11-10 VITALS
HEART RATE: 100 BPM | RESPIRATION RATE: 15 BRPM | DIASTOLIC BLOOD PRESSURE: 95 MMHG | BODY MASS INDEX: 32.44 KG/M2 | SYSTOLIC BLOOD PRESSURE: 171 MMHG | OXYGEN SATURATION: 100 % | WEIGHT: 160.6 LBS | TEMPERATURE: 98.3 F

## 2020-11-10 DIAGNOSIS — R06.00 DYSPNEA, UNSPECIFIED TYPE: ICD-10-CM

## 2020-11-10 DIAGNOSIS — J44.1 COPD WITH ACUTE EXACERBATION (HCC): ICD-10-CM

## 2020-11-10 DIAGNOSIS — R07.89 ATYPICAL CHEST PAIN: Primary | ICD-10-CM

## 2020-11-10 LAB
ALBUMIN SERPL-MCNC: 3.7 G/DL (ref 3.4–5)
ALBUMIN/GLOB SERPL: 1 {RATIO} (ref 0.8–1.7)
ALP SERPL-CCNC: 98 U/L (ref 45–117)
ALT SERPL-CCNC: 15 U/L (ref 13–56)
ANION GAP SERPL CALC-SCNC: 6 MMOL/L (ref 3–18)
AST SERPL-CCNC: 14 U/L (ref 10–38)
ATRIAL RATE: 83 BPM
BASOPHILS # BLD: 0 K/UL (ref 0–0.1)
BASOPHILS NFR BLD: 0 % (ref 0–2)
BILIRUB SERPL-MCNC: 0.5 MG/DL (ref 0.2–1)
BUN SERPL-MCNC: 6 MG/DL (ref 7–18)
BUN/CREAT SERPL: 8 (ref 12–20)
CALCIUM SERPL-MCNC: 9.3 MG/DL (ref 8.5–10.1)
CALCULATED P AXIS, ECG09: 63 DEGREES
CALCULATED R AXIS, ECG10: -69 DEGREES
CALCULATED T AXIS, ECG11: 86 DEGREES
CHLORIDE SERPL-SCNC: 104 MMOL/L (ref 100–111)
CK MB CFR SERPL CALC: 3.3 % (ref 0–4)
CK MB CFR SERPL CALC: 4 % (ref 0–4)
CK MB SERPL-MCNC: 2.5 NG/ML (ref 5–25)
CK MB SERPL-MCNC: 2.8 NG/ML (ref 5–25)
CK SERPL-CCNC: 63 U/L (ref 26–192)
CK SERPL-CCNC: 85 U/L (ref 26–192)
CO2 SERPL-SCNC: 32 MMOL/L (ref 21–32)
CREAT SERPL-MCNC: 0.8 MG/DL (ref 0.6–1.3)
DIAGNOSIS, 93000: NORMAL
DIFFERENTIAL METHOD BLD: NORMAL
EOSINOPHIL # BLD: 0.3 K/UL (ref 0–0.4)
EOSINOPHIL NFR BLD: 4 % (ref 0–5)
ERYTHROCYTE [DISTWIDTH] IN BLOOD BY AUTOMATED COUNT: 12.9 % (ref 11.6–14.5)
GLOBULIN SER CALC-MCNC: 3.6 G/DL (ref 2–4)
GLUCOSE SERPL-MCNC: 107 MG/DL (ref 74–99)
HCT VFR BLD AUTO: 42 % (ref 35–45)
HGB BLD-MCNC: 14 G/DL (ref 12–16)
LYMPHOCYTES # BLD: 1.5 K/UL (ref 0.9–3.6)
LYMPHOCYTES NFR BLD: 22 % (ref 21–52)
MCH RBC QN AUTO: 30 PG (ref 24–34)
MCHC RBC AUTO-ENTMCNC: 33.3 G/DL (ref 31–37)
MCV RBC AUTO: 90.1 FL (ref 74–97)
MONOCYTES # BLD: 0.6 K/UL (ref 0.05–1.2)
MONOCYTES NFR BLD: 8 % (ref 3–10)
NEUTS SEG # BLD: 4.6 K/UL (ref 1.8–8)
NEUTS SEG NFR BLD: 66 % (ref 40–73)
P-R INTERVAL, ECG05: 124 MS
PLATELET # BLD AUTO: 219 K/UL (ref 135–420)
PMV BLD AUTO: 11 FL (ref 9.2–11.8)
POTASSIUM SERPL-SCNC: 3 MMOL/L (ref 3.5–5.5)
PROT SERPL-MCNC: 7.3 G/DL (ref 6.4–8.2)
Q-T INTERVAL, ECG07: 452 MS
QRS DURATION, ECG06: 134 MS
QTC CALCULATION (BEZET), ECG08: 531 MS
RBC # BLD AUTO: 4.66 M/UL (ref 4.2–5.3)
SODIUM SERPL-SCNC: 142 MMOL/L (ref 136–145)
TROPONIN I SERPL-MCNC: 0.04 NG/ML (ref 0–0.04)
TROPONIN I SERPL-MCNC: 0.05 NG/ML (ref 0–0.04)
VENTRICULAR RATE, ECG03: 83 BPM
WBC # BLD AUTO: 6.9 K/UL (ref 4.6–13.2)

## 2020-11-10 PROCEDURE — 94640 AIRWAY INHALATION TREATMENT: CPT

## 2020-11-10 PROCEDURE — 99284 EMERGENCY DEPT VISIT MOD MDM: CPT

## 2020-11-10 PROCEDURE — 85025 COMPLETE CBC W/AUTO DIFF WBC: CPT

## 2020-11-10 PROCEDURE — 71045 X-RAY EXAM CHEST 1 VIEW: CPT

## 2020-11-10 PROCEDURE — 74011000250 HC RX REV CODE- 250: Performed by: EMERGENCY MEDICINE

## 2020-11-10 PROCEDURE — 80053 COMPREHEN METABOLIC PANEL: CPT

## 2020-11-10 PROCEDURE — 82553 CREATINE MB FRACTION: CPT

## 2020-11-10 PROCEDURE — 74011250637 HC RX REV CODE- 250/637: Performed by: EMERGENCY MEDICINE

## 2020-11-10 PROCEDURE — 74011636637 HC RX REV CODE- 636/637: Performed by: EMERGENCY MEDICINE

## 2020-11-10 PROCEDURE — 99283 EMERGENCY DEPT VISIT LOW MDM: CPT

## 2020-11-10 PROCEDURE — 93005 ELECTROCARDIOGRAM TRACING: CPT

## 2020-11-10 RX ORDER — IPRATROPIUM BROMIDE AND ALBUTEROL SULFATE 2.5; .5 MG/3ML; MG/3ML
3 SOLUTION RESPIRATORY (INHALATION)
Status: DISPENSED | OUTPATIENT
Start: 2020-11-10 | End: 2020-11-10

## 2020-11-10 RX ORDER — DEXAMETHASONE SODIUM PHOSPHATE 4 MG/ML
10 INJECTION, SOLUTION INTRA-ARTICULAR; INTRALESIONAL; INTRAMUSCULAR; INTRAVENOUS; SOFT TISSUE
Status: DISCONTINUED | OUTPATIENT
Start: 2020-11-10 | End: 2020-11-11

## 2020-11-10 RX ORDER — ACETAMINOPHEN 500 MG
500 TABLET ORAL
Status: DISCONTINUED | OUTPATIENT
Start: 2020-11-10 | End: 2020-11-10 | Stop reason: HOSPADM

## 2020-11-10 RX ORDER — NAPROXEN 250 MG/1
500 TABLET ORAL
Status: COMPLETED | OUTPATIENT
Start: 2020-11-10 | End: 2020-11-10

## 2020-11-10 RX ORDER — ALBUTEROL SULFATE 0.83 MG/ML
2.5 SOLUTION RESPIRATORY (INHALATION)
Status: COMPLETED | OUTPATIENT
Start: 2020-11-10 | End: 2020-11-10

## 2020-11-10 RX ORDER — CLONIDINE HYDROCHLORIDE 0.1 MG/1
0.2 TABLET ORAL
Status: COMPLETED | OUTPATIENT
Start: 2020-11-10 | End: 2020-11-10

## 2020-11-10 RX ORDER — DOXYCYCLINE 100 MG/1
100 CAPSULE ORAL 2 TIMES DAILY
Qty: 14 CAP | Refills: 0 | Status: SHIPPED | OUTPATIENT
Start: 2020-11-10 | End: 2020-11-16

## 2020-11-10 RX ORDER — PREDNISONE 20 MG/1
60 TABLET ORAL
Status: COMPLETED | OUTPATIENT
Start: 2020-11-10 | End: 2020-11-10

## 2020-11-10 RX ORDER — PREDNISONE 10 MG/1
TABLET ORAL
Qty: 1 PACKAGE | Refills: 0 | Status: SHIPPED | OUTPATIENT
Start: 2020-11-10 | End: 2020-11-28

## 2020-11-10 RX ORDER — DOXYCYCLINE 100 MG/1
100 CAPSULE ORAL
Status: COMPLETED | OUTPATIENT
Start: 2020-11-10 | End: 2020-11-10

## 2020-11-10 RX ADMIN — NAPROXEN 500 MG: 250 TABLET ORAL at 05:35

## 2020-11-10 RX ADMIN — ALBUTEROL SULFATE 2.5 MG: 2.5 SOLUTION RESPIRATORY (INHALATION) at 03:29

## 2020-11-10 RX ADMIN — DOXYCYCLINE HYCLATE 100 MG: 100 CAPSULE ORAL at 10:20

## 2020-11-10 RX ADMIN — PREDNISONE 60 MG: 20 TABLET ORAL at 03:29

## 2020-11-10 RX ADMIN — CLONIDINE HYDROCHLORIDE 0.2 MG: 0.1 TABLET ORAL at 05:31

## 2020-11-10 NOTE — ED NOTES
Pt has been discharged but is refusing to leave states she wants to be treated  \"I feel worse then when I came in\" pt has been resting in bed this morning. Pt is now yelling out she is verbally aggressive with ED staff and Dr. Joss Gaines.

## 2020-11-10 NOTE — ED NOTES
Assumed care of the patient at 7 AM from Dr. Alfredo Mazariegos and the patient is awaiting a second troponin. The patient complains of shortness of breath that is been attributed to her COPD. The patient's blood pressure improved after clonidine. We will follow her second opponent and reevaluate. Arleene Ganser, DO 7:47 AM      The patient's repeat troponin is trending down and the patient had a catheterization the last year which was reassuring for coronary disease. The patient's renal function is normal and blood pressure has improved. Will give a steroid taper and have the patient follow closely with her primary doctor in the next 24 hours and return if at all worsened or concerned. Workup and recommendations were reviewed with the patient and all questions were answered. The patient understands the plan and will proceed with close outpatient care. I have encouraged the patient to return if at all worsened or concerned.    Arleene Ganser, DO 8:23 AM

## 2020-11-10 NOTE — DISCHARGE INSTRUCTIONS
Patient Education        COPD Exacerbation Plan: Care Instructions  Your Care Instructions     If you have chronic obstructive pulmonary disease (COPD), your usual shortness of breath could suddenly get worse. You may start coughing more and have more mucus. This flare-up is called a COPD exacerbation (say \"[id number]-SLY-[id number]-[id number]\"). A lung infection or air pollution could set off an exacerbation. Sometimes it can happen after a quick change in temperature or being around chemicals. Work with your doctor to make a plan for dealing with an exacerbation. You can better manage it if you plan ahead. Follow-up care is a key part of your treatment and safety. Be sure to make and go to all appointments, and call your doctor if you are having problems. It's also a good idea to know your test results and keep a list of the medicines you take. How can you care for yourself at home? During an exacerbation  · Do not panic if you start to have one. Quick treatment at home may help you prevent serious breathing problems. If you have a COPD exacerbation plan that you developed with your doctor, follow it. · Take your medicines exactly as your doctor tells you.  ? Use your inhaler as directed by your doctor. If your symptoms do not get better after you use your medicine, have someone take you to the emergency room. Call an ambulance if necessary. ? With inhaled medicines, a spacer or a nebulizer may help you get more medicine to your lungs. Ask your doctor or pharmacist how to use them properly. Practice using the spacer in front of a mirror before you have an exacerbation. This may help you get the medicine into your lungs quickly. ? If your doctor has given you steroid pills, take them as directed. ? Your doctor may have given you a prescription for antibiotics, which you can fill if you need it. ? Talk to your doctor if you have any problems with your medicine.  And call your doctor if you have to use your antibiotic or steroid pills. Preventing an exacerbation  · Do not smoke. This is the most important step you can take to prevent more damage to your lungs and prevent problems. If you already smoke, it is never too late to stop. If you need help quitting, talk to your doctor about stop-smoking programs and medicines. These can increase your chances of quitting for good. · Take your daily medicines as prescribed. · Avoid colds and flu. ? Get a pneumococcal vaccine. ? Get a flu vaccine each year, as soon as it is available. Ask those you live or work with to do the same, so they will not get the flu and infect you. ? Try to stay away from people with colds or the flu. ? Wash your hands often. · Avoid secondhand smoke; air pollution; cold, dry air; hot, humid air; and high altitudes. Stay at home with your windows closed when air pollution is bad. · Learn breathing techniques for COPD, such as breathing through pursed lips. These techniques can help you breathe easier during an exacerbation. When should you call for help? Call 911 anytime you think you may need emergency care. For example, call if:    · You have severe trouble breathing.     · You have severe chest pain. Call your doctor now or seek immediate medical care if:    · You have new or worse shortness of breath.     · You develop new chest pain.     · You are coughing more deeply or more often, especially if you notice more mucus or a change in the color of your mucus.     · You cough up blood.     · You have new or increased swelling in your legs or belly.     · You have a fever. Watch closely for changes in your health, and be sure to contact your doctor if:    · You need to use your antibiotic or steroid pills.     · Your symptoms are getting worse. Where can you learn more? Go to http://www.gray.com/  Enter U536 in the search box to learn more about \"COPD Exacerbation Plan: Care Instructions. \"  Current as of: February 24, 2020               Content Version: 12.6  © 8573-5173 [employer]. Care instructions adapted under license by [employer] (which disclaims liability or warranty for this information). If you have questions about a medical condition or this instruction, always ask your healthcare professional. [address] any warranty or liability for your use of this information.

## 2020-11-10 NOTE — ED PROVIDER NOTES
EMERGENCY DEPARTMENT HISTORY AND PHYSICAL EXAM  This was created with voice recognition software and transcription errors may be present. 3:30 AM  Date: 11/10/2020  Patient Name: Gary Barth    History of Presenting Illness     Chief Complaint:    History Provided By:     HPI: Gary Barth is a 63 y.o. female past medical history of CHF CKD COPD cocaine abuse hypertension morbid obesity NSTEMI on home oxygen who presents with shortness of breath. Patient states is been going on since Friday.   Associate with wheezing consistent with prior COPD no fever no chills no chest pain no nausea no vomiting no known sick contacts    PCP: Awais Azul NP      Past History     Past Medical History:  Past Medical History:   Diagnosis Date    CHF (congestive heart failure) ([address]     Chronic respiratory failure with hypoxia ([address] 9/7/2020    CKD (chronic kidney disease) stage 3, GFR 30-59 ml/min 10/31/2019    Cocaine abuse ([address] 11/26/2017    COPD (chronic obstructive pulmonary disease) with chronic bronchitis ([address] 12/19/2017    Dependence on supplemental oxygen     Depression 3/22/2020    Drug-seeking behavior 11/19/2016    Endocrine disease     thyroid issues    Essential hypertension 3/10/2017    Fe deficiency anemia 10/27/2012    Fibromyalgia 12/16/2016    Gastroesophageal reflux disease without esophagitis 10/10/2016    Gastrointestinal disorder     \"blockage in my stomach\"    Hypercholesterolemia     Hypertension     Hypertensive heart failure ([address] 12/19/2017    Morbid obesity due to excess calories ([address] 3/10/2017    NSTEMI (non-ST elevated myocardial infarction) ([address] 3/22/2020    On home O2 12/3/2015    Overview:  2L at home per pt for approx 8 years    Polysubstance abuse ([address] 9/15/2018    Respiratory failure ([address] 1/11/2017    S/P hernia repair 10/31/2019    Sleep apnea 12/19/2017    T wave inversion in EKG 3/9/2019    Tobacco use 5/86/4649    Uncomplicated severe persistent asthma 12/13/2016    Vocal cord disease 12/7/2016       Past Surgical History:  Past Surgical History:   Procedure Laterality Date    HX GI      Exploratory laparotomy with lysis of adhesions and primary repair of incarcerated umbilical hernia       Family History:  No family history on file. Social History:  Social History     Tobacco Use    Smoking status: Current Every Day Smoker     Packs/day: 0.25    Smokeless tobacco: Current User   Substance Use Topics    Alcohol use: No     Comment: socially    Drug use: Not Currently     Types: Heroin       Allergies:  No Known Allergies    Review of Systems     Review of Systems   Constitutional: Negative for fever. Respiratory: Positive for shortness of breath. Cardiovascular: Negative for chest pain. All other systems reviewed and are negative. 10 point review of systems otherwise negative unless noted in HPI. Physical Exam       Physical Exam  Constitutional:       Appearance: She is well-developed. HENT:      Head: Normocephalic and atraumatic. Eyes:      Pupils: Pupils are equal, round, and reactive to light. Neck:      Musculoskeletal: Normal range of motion and neck supple. Cardiovascular:      Rate and Rhythm: Normal rate and regular rhythm. Heart sounds: Normal heart sounds. No murmur. No friction rub. Pulmonary:      Effort: Pulmonary effort is normal. No respiratory distress. Breath sounds: Normal breath sounds. No wheezing. Comments: Bilateral expiratory wheezing  Abdominal:      General: There is no distension. Palpations: Abdomen is soft. Tenderness: There is no abdominal tenderness. There is no guarding or rebound. Musculoskeletal: Normal range of motion. Skin:     General: Skin is warm and dry. Neurological:      Mental Status: She is alert and oriented to person, place, and time. Psychiatric:         Behavior: Behavior normal.         Thought Content:  Thought content normal. Diagnostic Study Results     Vital Signs  EKG: EKG shows sinus at 83 with a axis normal intervals there is no ST elevation or depression no hypertrophy. The prior patient now has a left bundle with a QRS of 134 compared to prior but patient did have this in late September  Labs: cbcis unremarkable mild hypokalemia troponin 0.05  Imaging: Chest x-ray is unchanged from prior    Medical Decision Making     ED Course: Progress Notes, Reevaluation, and Consults:      Provider Notes (Medical Decision Making): This is a 23-year-old female known to me from multiple prior visits presents with what appears to be a COPD exacerbation. Bilateral expiratory wheezing for the past few days, she is on her home oxygen. Will check basic labs EKG x-ray and treat her with albuterol and prednisone    Patient turned over to Dr. Dipika calderon pending observation clinical improvement and discharge if appropriate as well as a delta [place]         Diagnosis     Clinical Impression: No diagnosis found. Disposition:    Patient's Medications   Start Taking    No medications on file   Continue Taking    ALBUTEROL SULFATE 90 MCG/ACTUATION AEBS    Take 2 Puffs by inhalation every four to six (4-6) hours as needed for Wheezing for up to 30 days. ALBUTEROL-IPRATROPIUM (DUO-NEB) 2.5 MG-0.5 MG/3 ML NEBU    3 mL by Nebulization route every four (4) hours as needed for Wheezing, Shortness of Breath or Cough. ALPRAZOLAM (XANAX) 0.5 MG TABLET    Take 1 Tab by mouth every eight (8) hours as needed for Anxiety. Max Daily Amount: 1.5 mg. ARFORMOTEROL (BROVANA) 15 MCG/2 ML NEBU NEB SOLUTION    2 mL by Nebulization route two (2) times a day. Indications: bronchospasm prevention with COPD    ASPIRIN DELAYED-RELEASE 81 MG TABLET    Take 1 Tab by mouth daily. ATORVASTATIN (LIPITOR) 20 MG TABLET    Take 1 Tab by mouth nightly. BUDESONIDE (PULMICORT) 0.5 MG/2 ML NBSP    2 mL by Nebulization route two (2) times a day.     CITALOPRAM (CELEXA) 20 MG TABLET    1 tablet    CLONIDINE HCL (CATAPRES) 0.2 MG TABLET    Take 1 Tab by mouth two (2) times a day for 30 days. DOCUSATE SODIUM (COLACE) 100 MG CAPSULE    Take 1 Cap by mouth two (2) times a day. FAMOTIDINE (PEPCID) 20 MG TABLET    Take 1 Tab by mouth daily. FLUTICASONE FUROATE-VILANTEROL (BREO ELLIPTA) 200-25 MCG/DOSE INHALER    Take 1 Puff by inhalation daily. GEMFIBROZIL (LOPID) 600 MG TABLET    1 tablet    MONTELUKAST (SINGULAIR) 10 MG TABLET    Take 1 Tab by mouth nightly. NALOXONE (NARCAN) 4 MG/ACTUATION NASAL SPRAY    Use 1 spray intranasally, then discard. Repeat with new spray every 2 min as needed for opioid overdose symptoms, alternating nostrils. NITROGLYCERIN (NITROSTAT) 0.4 MG SL TABLET    Take 1 Tab by mouth every five (5) minutes as needed for Chest Pain. Sit/Lay down then put one tab under the tongue every 5 minutes as needed for chest pain for 3 doses    OXYGEN-AIR DELIVERY SYSTEMS    3 L by IntraNASal route as needed for Other (sob). QUETIAPINE (SEROQUEL) 50 MG TABLET    TAKE 1 TABLET BY MOUTH AT BEDTIME FOR MOOD    ROFLUMILAST (DALIRESP) 500 MCG TAB TABLET    Take 1 Tab by mouth daily. SUCRALFATE (CARAFATE) 100 MG/ML SUSPENSION    Take 1,000 mg by mouth.    These Medications have changed    No medications on file   Stop Taking    No medications on file

## 2020-11-11 ENCOUNTER — HOSPITAL ENCOUNTER (OUTPATIENT)
Age: 55
Setting detail: OBSERVATION
Discharge: LEFT BEFORE DISCHARGE | End: 2020-11-13
Attending: EMERGENCY MEDICINE | Admitting: INTERNAL MEDICINE
Payer: MEDICAID

## 2020-11-11 ENCOUNTER — HOSPITAL ENCOUNTER (EMERGENCY)
Age: 55
Discharge: HOME | End: 2020-11-11
Attending: EMERGENCY MEDICINE
Payer: MEDICAID

## 2020-11-11 ENCOUNTER — APPOINTMENT (OUTPATIENT)
Dept: GENERAL RADIOLOGY | Age: 55
End: 2020-11-11
Attending: EMERGENCY MEDICINE
Payer: MEDICAID

## 2020-11-11 DIAGNOSIS — J44.1 ACUTE EXACERBATION OF CHRONIC OBSTRUCTIVE PULMONARY DISEASE (COPD) (HCC): ICD-10-CM

## 2020-11-11 DIAGNOSIS — R06.02 SOB (SHORTNESS OF BREATH): Primary | ICD-10-CM

## 2020-11-11 DIAGNOSIS — J45.20 MILD INTERMITTENT ASTHMA WITHOUT COMPLICATION: Primary | ICD-10-CM

## 2020-11-11 DIAGNOSIS — F17.200 SMOKING ADDICTION: ICD-10-CM

## 2020-11-11 LAB
ALBUMIN SERPL-MCNC: 3.8 G/DL (ref 3.4–5)
ALBUMIN/GLOB SERPL: 1.3 {RATIO} (ref 0.8–1.7)
ALP SERPL-CCNC: 80 U/L (ref 45–117)
ALT SERPL-CCNC: 17 U/L (ref 13–56)
ANION GAP SERPL CALC-SCNC: 10 MMOL/L (ref 3–18)
AST SERPL-CCNC: 11 U/L (ref 10–38)
ATRIAL RATE: 98 BPM
BASOPHILS # BLD: 0 K/UL (ref 0–0.1)
BASOPHILS NFR BLD: 0 % (ref 0–2)
BILIRUB SERPL-MCNC: 0.3 MG/DL (ref 0.2–1)
BNP SERPL-MCNC: 658 PG/ML (ref 0–900)
BUN SERPL-MCNC: 23 MG/DL (ref 7–18)
BUN/CREAT SERPL: 13 (ref 12–20)
CALCIUM SERPL-MCNC: 9.3 MG/DL (ref 8.5–10.1)
CALCULATED P AXIS, ECG09: 70 DEGREES
CALCULATED R AXIS, ECG10: 48 DEGREES
CALCULATED T AXIS, ECG11: -74 DEGREES
CHLORIDE SERPL-SCNC: 101 MMOL/L (ref 100–111)
CK MB CFR SERPL CALC: 3.2 % (ref 0–4)
CK MB SERPL-MCNC: 2.1 NG/ML (ref 5–25)
CK SERPL-CCNC: 65 U/L (ref 26–192)
CO2 SERPL-SCNC: 28 MMOL/L (ref 21–32)
CREAT SERPL-MCNC: 1.77 MG/DL (ref 0.6–1.3)
DIAGNOSIS, 93000: NORMAL
DIFFERENTIAL METHOD BLD: ABNORMAL
EOSINOPHIL # BLD: 0 K/UL (ref 0–0.4)
EOSINOPHIL NFR BLD: 0 % (ref 0–5)
ERYTHROCYTE [DISTWIDTH] IN BLOOD BY AUTOMATED COUNT: 12.9 % (ref 11.6–14.5)
GLOBULIN SER CALC-MCNC: 3 G/DL (ref 2–4)
GLUCOSE SERPL-MCNC: 144 MG/DL (ref 74–99)
HCT VFR BLD AUTO: 38.5 % (ref 35–45)
HGB BLD-MCNC: 12.8 G/DL (ref 12–16)
LYMPHOCYTES # BLD: 1.5 K/UL (ref 0.9–3.6)
LYMPHOCYTES NFR BLD: 13 % (ref 21–52)
MCH RBC QN AUTO: 29.7 PG (ref 24–34)
MCHC RBC AUTO-ENTMCNC: 33.2 G/DL (ref 31–37)
MCV RBC AUTO: 89.3 FL (ref 74–97)
MONOCYTES # BLD: 0.7 K/UL (ref 0.05–1.2)
MONOCYTES NFR BLD: 6 % (ref 3–10)
NEUTS SEG # BLD: 10 K/UL (ref 1.8–8)
NEUTS SEG NFR BLD: 81 % (ref 40–73)
P-R INTERVAL, ECG05: 116 MS
PLATELET # BLD AUTO: 254 K/UL (ref 135–420)
PMV BLD AUTO: 10.9 FL (ref 9.2–11.8)
POTASSIUM SERPL-SCNC: 3.2 MMOL/L (ref 3.5–5.5)
PROT SERPL-MCNC: 6.8 G/DL (ref 6.4–8.2)
Q-T INTERVAL, ECG07: 372 MS
QRS DURATION, ECG06: 88 MS
QTC CALCULATION (BEZET), ECG08: 474 MS
RBC # BLD AUTO: 4.31 M/UL (ref 4.2–5.3)
SODIUM SERPL-SCNC: 139 MMOL/L (ref 136–145)
TROPONIN I SERPL-MCNC: <0.02 NG/ML (ref 0–0.04)
VENTRICULAR RATE, ECG03: 98 BPM
WBC # BLD AUTO: 12.2 K/UL (ref 4.6–13.2)

## 2020-11-11 PROCEDURE — 74011000250 HC RX REV CODE- 250

## 2020-11-11 PROCEDURE — 74011000250 HC RX REV CODE- 250: Performed by: EMERGENCY MEDICINE

## 2020-11-11 PROCEDURE — 96375 TX/PRO/DX INJ NEW DRUG ADDON: CPT

## 2020-11-11 PROCEDURE — 71045 X-RAY EXAM CHEST 1 VIEW: CPT

## 2020-11-11 PROCEDURE — 83735 ASSAY OF MAGNESIUM: CPT

## 2020-11-11 PROCEDURE — 74011636637 HC RX REV CODE- 636/637: Performed by: NURSE PRACTITIONER

## 2020-11-11 PROCEDURE — 74011250636 HC RX REV CODE- 250/636: Performed by: EMERGENCY MEDICINE

## 2020-11-11 PROCEDURE — 99284 EMERGENCY DEPT VISIT MOD MDM: CPT

## 2020-11-11 PROCEDURE — 94640 AIRWAY INHALATION TREATMENT: CPT

## 2020-11-11 PROCEDURE — 96376 TX/PRO/DX INJ SAME DRUG ADON: CPT

## 2020-11-11 PROCEDURE — 74011250637 HC RX REV CODE- 250/637: Performed by: EMERGENCY MEDICINE

## 2020-11-11 PROCEDURE — 80053 COMPREHEN METABOLIC PANEL: CPT

## 2020-11-11 PROCEDURE — 96374 THER/PROPH/DIAG INJ IV PUSH: CPT

## 2020-11-11 PROCEDURE — 82553 CREATINE MB FRACTION: CPT

## 2020-11-11 PROCEDURE — 83880 ASSAY OF NATRIURETIC PEPTIDE: CPT

## 2020-11-11 PROCEDURE — 85025 COMPLETE CBC W/AUTO DIFF WBC: CPT

## 2020-11-11 RX ORDER — DEXAMETHASONE SODIUM PHOSPHATE 4 MG/ML
10 INJECTION, SOLUTION INTRA-ARTICULAR; INTRALESIONAL; INTRAMUSCULAR; INTRAVENOUS; SOFT TISSUE
Status: DISCONTINUED | OUTPATIENT
Start: 2020-11-11 | End: 2020-11-11

## 2020-11-11 RX ORDER — OXYCODONE AND ACETAMINOPHEN 5; 325 MG/1; MG/1
1 TABLET ORAL
Status: COMPLETED | OUTPATIENT
Start: 2020-11-11 | End: 2020-11-11

## 2020-11-11 RX ORDER — FUROSEMIDE 10 MG/ML
40 INJECTION INTRAMUSCULAR; INTRAVENOUS
Status: COMPLETED | OUTPATIENT
Start: 2020-11-11 | End: 2020-11-11

## 2020-11-11 RX ORDER — MAGNESIUM SULFATE HEPTAHYDRATE 500 MG/ML
2 INJECTION, SOLUTION INTRAMUSCULAR; INTRAVENOUS
Status: COMPLETED | OUTPATIENT
Start: 2020-11-11 | End: 2020-11-11

## 2020-11-11 RX ORDER — IPRATROPIUM BROMIDE AND ALBUTEROL SULFATE 2.5; .5 MG/3ML; MG/3ML
SOLUTION RESPIRATORY (INHALATION)
Status: COMPLETED
Start: 2020-11-11 | End: 2020-11-11

## 2020-11-11 RX ORDER — PREDNISONE 20 MG/1
60 TABLET ORAL
Status: COMPLETED | OUTPATIENT
Start: 2020-11-11 | End: 2020-11-11

## 2020-11-11 RX ORDER — IPRATROPIUM BROMIDE AND ALBUTEROL SULFATE 2.5; .5 MG/3ML; MG/3ML
3 SOLUTION RESPIRATORY (INHALATION)
Status: DISPENSED | OUTPATIENT
Start: 2020-11-11 | End: 2020-11-11

## 2020-11-11 RX ORDER — PREDNISONE 20 MG/1
60 TABLET ORAL DAILY
Qty: 15 TAB | Refills: 0 | Status: SHIPPED | OUTPATIENT
Start: 2020-11-11 | End: 2020-11-16

## 2020-11-11 RX ORDER — ALBUTEROL SULFATE 90 UG/1
2 AEROSOL, METERED RESPIRATORY (INHALATION)
Qty: 1 INHALER | Refills: 0 | Status: SHIPPED | OUTPATIENT
Start: 2020-11-11 | End: 2020-11-28

## 2020-11-11 RX ADMIN — METHYLPREDNISOLONE SODIUM SUCCINATE 125 MG: 125 INJECTION, POWDER, FOR SOLUTION INTRAMUSCULAR; INTRAVENOUS at 21:15

## 2020-11-11 RX ADMIN — IPRATROPIUM BROMIDE AND ALBUTEROL SULFATE 3 ML: 2.5; .5 SOLUTION RESPIRATORY (INHALATION) at 00:19

## 2020-11-11 RX ADMIN — IPRATROPIUM BROMIDE AND ALBUTEROL SULFATE 3 ML: .5; 3 SOLUTION RESPIRATORY (INHALATION) at 00:19

## 2020-11-11 RX ADMIN — PREDNISONE 60 MG: 20 TABLET ORAL at 00:18

## 2020-11-11 RX ADMIN — IPRATROPIUM BROMIDE AND ALBUTEROL SULFATE 3 ML: .5; 3 SOLUTION RESPIRATORY (INHALATION) at 20:39

## 2020-11-11 RX ADMIN — OXYCODONE HYDROCHLORIDE AND ACETAMINOPHEN 1 TABLET: 5; 325 TABLET ORAL at 23:40

## 2020-11-11 RX ADMIN — FUROSEMIDE 40 MG: 10 INJECTION, SOLUTION INTRAMUSCULAR; INTRAVENOUS at 21:20

## 2020-11-11 RX ADMIN — MAGNESIUM SULFATE HEPTAHYDRATE 2 G: 500 INJECTION, SOLUTION INTRAMUSCULAR; INTRAVENOUS at 23:38

## 2020-11-11 NOTE — ED PROVIDER NOTES
[facility]  Emergency Department Treatment Report        9:13 PM Tay Lopez is a 63 y.o. female with a history of asthma and smoking cigarettes and cocaine. Who presents to ED with shortness of breath and chest pain. Patient was seen here today had a negative work-up was sent home with steroids she has not picked up the steroids at this point in time yet. Patient return for similar symptoms. Patient is well-known to this emergency department multiple visits for the same. Patient has been encouraged to stop smoking cigarettes and cocaine and she has not stopped. Patient while in waiting room has got out several times to smoke cigarettes. No other complaints, associated symptoms or modifying factors at this time. PCP: Emmalene Leyden, NP      The history is provided by the patient. No  was used.         Past Medical History:   Diagnosis Date    CHF (congestive heart failure) (HCC)     Chronic respiratory failure with hypoxia ([address] 9/7/2020    CKD (chronic kidney disease) stage 3, GFR 30-59 ml/min 10/31/2019    Cocaine abuse ([address] 11/26/2017    COPD (chronic obstructive pulmonary disease) with chronic bronchitis ([address] 12/19/2017    Dependence on supplemental oxygen     Depression 3/22/2020    Drug-seeking behavior 11/19/2016    Endocrine disease     thyroid issues    Essential hypertension 3/10/2017    Fe deficiency anemia 10/27/2012    Fibromyalgia 12/16/2016    Gastroesophageal reflux disease without esophagitis 10/10/2016    Gastrointestinal disorder     \"blockage in my stomach\"    Hypercholesterolemia     Hypertension     Hypertensive heart failure ([address] 12/19/2017    Morbid obesity due to excess calories ([address] 3/10/2017    NSTEMI (non-ST elevated myocardial infarction) ([address] 3/22/2020    On home O2 12/3/2015    Overview:  2L at home per pt for approx 8 years    Polysubstance abuse ([address] 9/15/2018    Respiratory failure ([address] 1/11/2017    S/P hernia repair 10/31/2019    Sleep apnea 12/19/2017    T wave inversion in EKG 3/9/2019    Tobacco use 2/36/4551    Uncomplicated severe persistent asthma 12/13/2016    Vocal cord disease 12/7/2016       Past Surgical History:   Procedure Laterality Date    HX GI      Exploratory laparotomy with lysis of adhesions and primary repair of incarcerated umbilical hernia         No family history on file. Social History     Socioeconomic History    Marital status: SINGLE     Spouse name: Not on file    Number of children: Not on file    Years of education: Not on file    Highest education level: Not on file   Occupational History    Not on file   Social Needs    Financial resource strain: Not on file    Food insecurity     Worry: Not on file     Inability: Not on file    Transportation needs     Medical: Not on file     Non-medical: Not on file   Tobacco Use    Smoking status: Current Every Day Smoker     Packs/day: 0.25    Smokeless tobacco: Current User   Substance and Sexual Activity    Alcohol use: No     Comment: socially    Drug use: Not Currently     Types: Heroin    Sexual activity: Not Currently     Comment: last used 9 years ago   Lifestyle    Physical activity     Days per week: Not on file     Minutes per session: Not on file    Stress: Not on file   Relationships    Social connections     Talks on phone: Not on file     Gets together: Not on file     Attends [employer] service: Not on file     Active member of club or organization: Not on file     Attends meetings of clubs or organizations: Not on file     Relationship status: Not on file    Intimate partner violence     Fear of current or ex partner: Not on file     Emotionally abused: Not on file     Physically abused: Not on file     Forced sexual activity: Not on file   Other Topics Concern    Not on file   Social History Narrative    Not on file         ALLERGIES: Patient has no known allergies.     Review of Systems Constitutional: Negative for fever. Respiratory: Positive for wheezing. Negative for cough, choking, chest tightness and shortness of breath. Cardiovascular: Positive for chest pain. Negative for palpitations and leg swelling. Gastrointestinal: Negative for abdominal pain. Musculoskeletal: Negative for back pain. Neurological: Negative for dizziness. All other systems reviewed and are negative. Vitals:    11/10/20 1909   BP: (!) 171/95   Pulse: 100   Resp: 15   Temp: 98.3 °F (36.8 °C)   SpO2: 100%   Weight: 72.8 kg (160 lb 9.6 oz)            Physical Exam  Vitals signs and nursing note reviewed. Constitutional:       General: She is not in acute distress. Appearance: She is well-developed. She is not ill-appearing, toxic-appearing or diaphoretic. HENT:      Head: Normocephalic and atraumatic. Nose: Nose normal.   Eyes:      Conjunctiva/sclera: Conjunctivae normal.      Pupils: Pupils are equal, round, and reactive to light. Neck:      Musculoskeletal: Normal range of motion and neck supple. No neck rigidity or muscular tenderness. Cardiovascular:      Rate and Rhythm: Normal rate and regular rhythm. Pulses: Normal pulses. Heart sounds: Normal heart sounds. Pulmonary:      Effort: Pulmonary effort is normal. No respiratory distress. Breath sounds: No stridor. Wheezing present. Comments: Noted scattered wheeze  Abdominal:      General: Bowel sounds are normal.      Palpations: Abdomen is soft. Musculoskeletal: Normal range of motion. Skin:     General: Skin is warm and dry. Capillary Refill: Capillary refill takes less than 2 seconds. Neurological:      General: No focal deficit present. Mental Status: She is alert and oriented to person, place, and time. Deep Tendon Reflexes: Reflexes are normal and symmetric. Psychiatric:         Behavior: Behavior normal.         Thought Content:  Thought content normal.         Judgment: Judgment normal.          MDM  Number of Diagnoses or Management Options  Mild intermittent asthma without complication:   Smoking addiction:   Diagnosis management comments: Patient here with chest pain or shortness of breath wheezing was seen earlier today for same. Patient is not in any distress does not seem to be acutely ill. Wheezes noted on auscultation. Patient continues to go outside and smoke and she is requested to stop going inside and out and smoking as this is making her asthma worse. Patient refuses to stop doing what she is doing. Labs are ordered chest x-ray steroid and nebulizer treatments. She has been waiting room and going outside and smoking as again we have asked her to stop going outside and smoking. She continues to have some wheezing and insisting on oxygen. Patient refuses to have her pulse oximetry done it was 100% without oxygen at triage. No indication for oxygen at this time. Is argumentative and cursing at me and being aggressive. Standing at the nurses station argumentative refusing to leave the nurses station refused going back to the results waiting area from the charge nurse from myself and from the attending physician. She was offered treatment of her labs and patient refused each time the nurse tried to get her labs. Patient was brought into a fast track room and breathing treatment was being completed and patient was aggressive and abusive to the staff stating that they did not do the breathing treatment correctly. Patient was walking around the emergency department abusing the staff yelling at them patient was calling myself and them racist and being abusive. Patient's behavior caused a child's mother that was due for transfer take her child out of the emergency department because she did not want her to hear her abusive cursing language.     At this point in time patient is walking around swiftly in the emergency department in no distress no shortness of breath there is no acute illness noted on this patient. She had some wheezing she was given a breathing treatment she was offered medication and she refused she was offered labs and she refused. At this point in time I do not suspect an acute illness patient will be discharged from this facility and was told that aggression and abusive behavior is not excepted. Advised to start taking the steroid she was prescribed earlier today. Mina Zhang supervisor was informed of patient's behavior    Mina Zhang supervisor came to talk to the patient and inform her that her behavior was unacceptable. She offered to accept treatment and finish her breathing treatment and get Decadron I change that to an IM injection. We will get this prior to discharge.        Amount and/or Complexity of Data Reviewed  Clinical lab tests: ordered and reviewed  Tests in the radiology section of CPT®: ordered and reviewed  Review and summarize past medical records: yes  Independent visualization of images, tracings, or specimens: yes    Risk of Complications, Morbidity, and/or Mortality  Presenting problems: moderate  Diagnostic procedures: moderate  Management options: moderate    Patient Progress  Patient progress: stable         Procedures        Vitals:  Patient Vitals for the past 12 hrs:   Temp Pulse Resp BP SpO2   11/10/20 1909 98.3 °F (36.8 °C) 100 15 (!) 171/95 100 %       Medications ordered:   Medications   albuterol-ipratropium (DUO-NEB) 2.5 MG-0.5 MG/3 ML (has no administration in time range)   dexamethasone (DECADRON) 4 mg/mL injection 10 mg (has no administration in time range)         Lab findings:  Recent Results (from the past 12 hour(s))   EKG, 12 LEAD, INITIAL    Collection Time: 11/10/20  7:19 PM   Result Value Ref Range    Ventricular Rate 98 BPM    Atrial Rate 98 BPM    P-R Interval 116 ms    QRS Duration 88 ms    Q-T Interval 372 ms    QTC Calculation (Bezet) 474 ms    Calculated P Axis 70 degrees    Calculated R Axis 48 degrees    Calculated T Axis -74 degrees    Diagnosis       Normal sinus rhythm  Possible Left atrial enlargement  Left ventricular hypertrophy  ST & T wave abnormality, consider inferolateral ischemia  Prolonged QT  Abnormal ECG  When compared with ECG of 10-NOV-2020 03:46,  Significant changes have occurred              X-Ray, CT or other radiology findings or impressions:  XR CHEST PORT    (Results Pending)     Negative for any acute findings         Disposition:    Diagnosis:   1. Mild intermittent asthma without complication    2. Smoking addiction        Disposition: to Home      Follow-up Information     Follow up With Specialties Details Why Contact Info    Arlyn Cano NP Nurse Practitioner   [address] [facility] [id number] [phone]             Patient's Medications   Start Taking    No medications on file   Continue Taking    ALBUTEROL SULFATE 90 MCG/ACTUATION AEBS    Take 2 Puffs by inhalation every four to six (4-6) hours as needed for Wheezing for up to 30 days. ALBUTEROL-IPRATROPIUM (DUO-NEB) 2.5 MG-0.5 MG/3 ML NEBU    3 mL by Nebulization route every four (4) hours as needed for Wheezing, Shortness of Breath or Cough. ALPRAZOLAM (XANAX) 0.5 MG TABLET    Take 1 Tab by mouth every eight (8) hours as needed for Anxiety. Max Daily Amount: 1.5 mg. ARFORMOTEROL (BROVANA) 15 MCG/2 ML NEBU NEB SOLUTION    2 mL by Nebulization route two (2) times a day. Indications: bronchospasm prevention with COPD    ASPIRIN DELAYED-RELEASE 81 MG TABLET    Take 1 Tab by mouth daily. ATORVASTATIN (LIPITOR) 20 MG TABLET    Take 1 Tab by mouth nightly. BUDESONIDE (PULMICORT) 0.5 MG/2 ML NBSP    2 mL by Nebulization route two (2) times a day. CITALOPRAM (CELEXA) 20 MG TABLET    1 tablet    CLONIDINE HCL (CATAPRES) 0.2 MG TABLET    Take 1 Tab by mouth two (2) times a day for 30 days. DOCUSATE SODIUM (COLACE) 100 MG CAPSULE    Take 1 Cap by mouth two (2) times a day.     DOXYCYCLINE (MONODOX) 100 MG CAPSULE    Take 1 Cap by mouth two (2) times a day for 7 days. FAMOTIDINE (PEPCID) 20 MG TABLET    Take 1 Tab by mouth daily. FLUTICASONE FUROATE-VILANTEROL (BREO ELLIPTA) 200-25 MCG/DOSE INHALER    Take 1 Puff by inhalation daily. GEMFIBROZIL (LOPID) 600 MG TABLET    1 tablet    MONTELUKAST (SINGULAIR) 10 MG TABLET    Take 1 Tab by mouth nightly. NALOXONE (NARCAN) 4 MG/ACTUATION NASAL SPRAY    Use 1 spray intranasally, then discard. Repeat with new spray every 2 min as needed for opioid overdose symptoms, alternating nostrils. NITROGLYCERIN (NITROSTAT) 0.4 MG SL TABLET    Take 1 Tab by mouth every five (5) minutes as needed for Chest Pain. Sit/Lay down then put one tab under the tongue every 5 minutes as needed for chest pain for 3 doses    OXYGEN-AIR DELIVERY SYSTEMS    3 L by IntraNASal route as needed for Other (sob). PREDNISONE (STERAPRED DS) 10 MG DOSE PACK    6 day dose pack per package instructions    QUETIAPINE (SEROQUEL) 50 MG TABLET    TAKE 1 TABLET BY MOUTH AT BEDTIME FOR MOOD    ROFLUMILAST (DALIRESP) 500 MCG TAB TABLET    Take 1 Tab by mouth daily. SUCRALFATE (CARAFATE) 100 MG/ML SUSPENSION    Take 1,000 mg by mouth. These Medications have changed    No medications on file   Stop Taking    No medications on file       Return to the ER if you are unable to obtain referral as directed. Maria R Zuniga  results have been reviewed with her. She has been counseled regarding her diagnosis, treatment, and plan. She verbally conveys understanding and agreement of the signs, symptoms, diagnosis, treatment and prognosis and additionally agrees to follow up as discussed. She also agrees with the care-plan and conveys that all of her questions have been answered.   I have also provided discharge instructions for her that include: educational information regarding their diagnosis and treatment, and list of reasons why they would want to return to the ED prior to their follow-up appointment, should her condition change. Amy COLEP-C,FNP-C      Dragon voice recognition was used to generate this report, which may have resulted in some phonetic based errors in grammar and contents.  Even though attempts were made to correct all the mistakes, some may have been missed, and remained in the body of the document

## 2020-11-11 NOTE — ED TRIAGE NOTES
I performed a brief evaluation, including history and physical, of the patient here in triage and I have determined that pt will need further treatment and evaluation from the main side ER physician. I have placed initial orders to help in expediting patients care.      November 10, 2020 at 7:16 PM - Larissa Moore PA-C        Visit Vitals  BP (!) 171/95 (BP 1 Location: Left arm, BP Patient Position: At rest)   Pulse 100   Temp 98.3 °F (36.8 °C)   Resp 15   Wt 72.8 kg (160 lb 9.6 oz)   SpO2 100%   BMI 32.44 kg/m²

## 2020-11-11 NOTE — ED NOTES
PT at desk numerous times cursing and yelling at staff. PT refusing medications by staff members and stating she was going to call the media on staff members. PT being very disruptive around all patients.

## 2020-11-12 PROBLEM — J44.9 COPD (CHRONIC OBSTRUCTIVE PULMONARY DISEASE) (HCC): Status: ACTIVE | Noted: 2020-11-12

## 2020-11-12 PROBLEM — J45.901 ASTHMA EXACERBATION: Status: ACTIVE | Noted: 2020-11-12

## 2020-11-12 LAB
ATRIAL RATE: 78 BPM
CALCULATED P AXIS, ECG09: 72 DEGREES
CALCULATED R AXIS, ECG10: 46 DEGREES
CALCULATED T AXIS, ECG11: 23 DEGREES
DIAGNOSIS, 93000: NORMAL
MAGNESIUM SERPL-MCNC: 2 MG/DL (ref 1.6–2.6)
P-R INTERVAL, ECG05: 132 MS
Q-T INTERVAL, ECG07: 426 MS
QRS DURATION, ECG06: 88 MS
QTC CALCULATION (BEZET), ECG08: 485 MS
VENTRICULAR RATE, ECG03: 78 BPM

## 2020-11-12 PROCEDURE — 99232 SBSQ HOSP IP/OBS MODERATE 35: CPT | Performed by: FAMILY MEDICINE

## 2020-11-12 PROCEDURE — 99222 1ST HOSP IP/OBS MODERATE 55: CPT | Performed by: INTERNAL MEDICINE

## 2020-11-12 PROCEDURE — 99218 HC RM OBSERVATION: CPT

## 2020-11-12 PROCEDURE — 74011000250 HC RX REV CODE- 250: Performed by: EMERGENCY MEDICINE

## 2020-11-12 PROCEDURE — 94640 AIRWAY INHALATION TREATMENT: CPT

## 2020-11-12 PROCEDURE — 93005 ELECTROCARDIOGRAM TRACING: CPT

## 2020-11-12 PROCEDURE — 77030038269 HC DRN EXT URIN PURWCK BARD -A

## 2020-11-12 PROCEDURE — 80307 DRUG TEST PRSMV CHEM ANLYZR: CPT

## 2020-11-12 PROCEDURE — 81003 URINALYSIS AUTO W/O SCOPE: CPT

## 2020-11-12 PROCEDURE — 77030027138 HC INCENT SPIROMETER -A

## 2020-11-12 PROCEDURE — 74011250637 HC RX REV CODE- 250/637: Performed by: EMERGENCY MEDICINE

## 2020-11-12 PROCEDURE — 65270000029 HC RM PRIVATE

## 2020-11-12 PROCEDURE — 74011250637 HC RX REV CODE- 250/637: Performed by: PHYSICIAN ASSISTANT

## 2020-11-12 PROCEDURE — 74011000250 HC RX REV CODE- 250: Performed by: INTERNAL MEDICINE

## 2020-11-12 PROCEDURE — 74011250637 HC RX REV CODE- 250/637: Performed by: INTERNAL MEDICINE

## 2020-11-12 PROCEDURE — 2709999900 HC NON-CHARGEABLE SUPPLY

## 2020-11-12 PROCEDURE — 74011250636 HC RX REV CODE- 250/636: Performed by: PHYSICIAN ASSISTANT

## 2020-11-12 PROCEDURE — 87086 URINE CULTURE/COLONY COUNT: CPT

## 2020-11-12 RX ORDER — POTASSIUM CHLORIDE 20 MEQ/1
40 TABLET, EXTENDED RELEASE ORAL
Status: COMPLETED | OUTPATIENT
Start: 2020-11-12 | End: 2020-11-12

## 2020-11-12 RX ORDER — ONDANSETRON 2 MG/ML
4 INJECTION INTRAMUSCULAR; INTRAVENOUS
Status: DISCONTINUED | OUTPATIENT
Start: 2020-11-12 | End: 2020-11-13 | Stop reason: HOSPADM

## 2020-11-12 RX ORDER — NITROGLYCERIN 0.4 MG/1
0.4 TABLET SUBLINGUAL
Status: DISCONTINUED | OUTPATIENT
Start: 2020-11-12 | End: 2020-11-13 | Stop reason: HOSPADM

## 2020-11-12 RX ORDER — POLYETHYLENE GLYCOL 3350 17 G/17G
17 POWDER, FOR SOLUTION ORAL DAILY PRN
Status: DISCONTINUED | OUTPATIENT
Start: 2020-11-12 | End: 2020-11-13 | Stop reason: HOSPADM

## 2020-11-12 RX ORDER — INSULIN LISPRO 100 [IU]/ML
INJECTION, SOLUTION INTRAVENOUS; SUBCUTANEOUS
Status: DISCONTINUED | OUTPATIENT
Start: 2020-11-12 | End: 2020-11-13 | Stop reason: HOSPADM

## 2020-11-12 RX ORDER — IPRATROPIUM BROMIDE AND ALBUTEROL SULFATE 2.5; .5 MG/3ML; MG/3ML
3 SOLUTION RESPIRATORY (INHALATION)
Status: DISCONTINUED | OUTPATIENT
Start: 2020-11-12 | End: 2020-11-13 | Stop reason: HOSPADM

## 2020-11-12 RX ORDER — DOCUSATE SODIUM 100 MG/1
100 CAPSULE, LIQUID FILLED ORAL 2 TIMES DAILY
Status: DISCONTINUED | OUTPATIENT
Start: 2020-11-12 | End: 2020-11-13 | Stop reason: HOSPADM

## 2020-11-12 RX ORDER — BUDESONIDE 0.5 MG/2ML
500 INHALANT ORAL 2 TIMES DAILY
Status: DISCONTINUED | OUTPATIENT
Start: 2020-11-12 | End: 2020-11-13 | Stop reason: HOSPADM

## 2020-11-12 RX ORDER — DEXTROSE 50 % IN WATER (D50W) INTRAVENOUS SYRINGE
25-50 AS NEEDED
Status: DISCONTINUED | OUTPATIENT
Start: 2020-11-12 | End: 2020-11-13 | Stop reason: HOSPADM

## 2020-11-12 RX ORDER — CLONIDINE HYDROCHLORIDE 0.1 MG/1
0.2 TABLET ORAL 2 TIMES DAILY
Status: DISCONTINUED | OUTPATIENT
Start: 2020-11-12 | End: 2020-11-13 | Stop reason: HOSPADM

## 2020-11-12 RX ORDER — QUETIAPINE FUMARATE 25 MG/1
50 TABLET, FILM COATED ORAL
Status: DISCONTINUED | OUTPATIENT
Start: 2020-11-12 | End: 2020-11-13 | Stop reason: HOSPADM

## 2020-11-12 RX ORDER — MONTELUKAST SODIUM 10 MG/1
10 TABLET ORAL
Status: DISCONTINUED | OUTPATIENT
Start: 2020-11-12 | End: 2020-11-13 | Stop reason: HOSPADM

## 2020-11-12 RX ORDER — MAGNESIUM SULFATE 100 %
16 CRYSTALS MISCELLANEOUS AS NEEDED
Status: DISCONTINUED | OUTPATIENT
Start: 2020-11-12 | End: 2020-11-13 | Stop reason: HOSPADM

## 2020-11-12 RX ORDER — CITALOPRAM 20 MG/1
20 TABLET, FILM COATED ORAL DAILY
Status: DISCONTINUED | OUTPATIENT
Start: 2020-11-12 | End: 2020-11-13 | Stop reason: HOSPADM

## 2020-11-12 RX ORDER — ACETAMINOPHEN 650 MG/1
650 SUPPOSITORY RECTAL
Status: DISCONTINUED | OUTPATIENT
Start: 2020-11-12 | End: 2020-11-13 | Stop reason: HOSPADM

## 2020-11-12 RX ORDER — SUCRALFATE 1 G/1
1 TABLET ORAL
Status: DISCONTINUED | OUTPATIENT
Start: 2020-11-12 | End: 2020-11-13 | Stop reason: HOSPADM

## 2020-11-12 RX ORDER — IPRATROPIUM BROMIDE AND ALBUTEROL SULFATE 2.5; .5 MG/3ML; MG/3ML
3 SOLUTION RESPIRATORY (INHALATION)
Status: COMPLETED | OUTPATIENT
Start: 2020-11-12 | End: 2020-11-12

## 2020-11-12 RX ORDER — ALPRAZOLAM 0.5 MG/1
0.5 TABLET ORAL
Status: DISCONTINUED | OUTPATIENT
Start: 2020-11-12 | End: 2020-11-13 | Stop reason: HOSPADM

## 2020-11-12 RX ORDER — PROMETHAZINE HYDROCHLORIDE 12.5 MG/1
12.5 TABLET ORAL
Status: DISCONTINUED | OUTPATIENT
Start: 2020-11-12 | End: 2020-11-13 | Stop reason: HOSPADM

## 2020-11-12 RX ORDER — ACETAMINOPHEN 325 MG/1
650 TABLET ORAL
Status: DISCONTINUED | OUTPATIENT
Start: 2020-11-12 | End: 2020-11-13 | Stop reason: HOSPADM

## 2020-11-12 RX ORDER — SODIUM CHLORIDE 9 MG/ML
75 INJECTION, SOLUTION INTRAVENOUS CONTINUOUS
Status: DISCONTINUED | OUTPATIENT
Start: 2020-11-12 | End: 2020-11-12

## 2020-11-12 RX ORDER — LIDOCAINE 4 G/100G
1 PATCH TOPICAL EVERY 24 HOURS
Status: DISCONTINUED | OUTPATIENT
Start: 2020-11-12 | End: 2020-11-13 | Stop reason: HOSPADM

## 2020-11-12 RX ORDER — POTASSIUM CHLORIDE 750 MG/1
40 TABLET, FILM COATED, EXTENDED RELEASE ORAL
Status: DISCONTINUED | OUTPATIENT
Start: 2020-11-12 | End: 2020-11-12

## 2020-11-12 RX ORDER — CODEINE PHOSPHATE AND GUAIFENESIN 10; 100 MG/5ML; MG/5ML
10 SOLUTION ORAL
Status: COMPLETED | OUTPATIENT
Start: 2020-11-12 | End: 2020-11-12

## 2020-11-12 RX ORDER — FAMOTIDINE 20 MG/1
20 TABLET, FILM COATED ORAL DAILY
Status: DISCONTINUED | OUTPATIENT
Start: 2020-11-12 | End: 2020-11-13 | Stop reason: HOSPADM

## 2020-11-12 RX ORDER — GEMFIBROZIL 600 MG/1
300 TABLET, FILM COATED ORAL 2 TIMES DAILY
Status: DISCONTINUED | OUTPATIENT
Start: 2020-11-12 | End: 2020-11-13 | Stop reason: HOSPADM

## 2020-11-12 RX ORDER — SODIUM CHLORIDE 0.9 % (FLUSH) 0.9 %
5-40 SYRINGE (ML) INJECTION EVERY 8 HOURS
Status: DISCONTINUED | OUTPATIENT
Start: 2020-11-12 | End: 2020-11-13 | Stop reason: HOSPADM

## 2020-11-12 RX ORDER — ASPIRIN 81 MG/1
81 TABLET ORAL DAILY
Status: DISCONTINUED | OUTPATIENT
Start: 2020-11-12 | End: 2020-11-13 | Stop reason: HOSPADM

## 2020-11-12 RX ORDER — ATORVASTATIN CALCIUM 20 MG/1
20 TABLET, FILM COATED ORAL
Status: DISCONTINUED | OUTPATIENT
Start: 2020-11-12 | End: 2020-11-13 | Stop reason: HOSPADM

## 2020-11-12 RX ORDER — SODIUM CHLORIDE 0.9 % (FLUSH) 0.9 %
5-40 SYRINGE (ML) INJECTION AS NEEDED
Status: DISCONTINUED | OUTPATIENT
Start: 2020-11-12 | End: 2020-11-13 | Stop reason: HOSPADM

## 2020-11-12 RX ADMIN — CITALOPRAM HYDROBROMIDE 20 MG: 20 TABLET ORAL at 08:31

## 2020-11-12 RX ADMIN — DOCUSATE SODIUM 100 MG: 100 CAPSULE, LIQUID FILLED ORAL at 08:31

## 2020-11-12 RX ADMIN — Medication 10 ML: at 14:00

## 2020-11-12 RX ADMIN — FAMOTIDINE 20 MG: 20 TABLET, FILM COATED ORAL at 08:31

## 2020-11-12 RX ADMIN — ROFLUMILAST 500 MCG: 500 TABLET ORAL at 13:00

## 2020-11-12 RX ADMIN — CLONIDINE HYDROCHLORIDE 0.2 MG: 0.1 TABLET ORAL at 08:31

## 2020-11-12 RX ADMIN — IPRATROPIUM BROMIDE AND ALBUTEROL SULFATE 3 ML: .5; 3 SOLUTION RESPIRATORY (INHALATION) at 22:47

## 2020-11-12 RX ADMIN — Medication 81 MG: at 08:32

## 2020-11-12 RX ADMIN — ALPRAZOLAM 0.5 MG: 0.5 TABLET ORAL at 17:58

## 2020-11-12 RX ADMIN — BUDESONIDE 500 MCG: 0.5 SUSPENSION RESPIRATORY (INHALATION) at 08:31

## 2020-11-12 RX ADMIN — IPRATROPIUM BROMIDE AND ALBUTEROL SULFATE 3 ML: .5; 3 SOLUTION RESPIRATORY (INHALATION) at 08:31

## 2020-11-12 RX ADMIN — ALPRAZOLAM 0.5 MG: 0.5 TABLET ORAL at 09:44

## 2020-11-12 RX ADMIN — SUCRALFATE 1 G: 1 TABLET ORAL at 08:31

## 2020-11-12 RX ADMIN — GEMFIBROZIL 300 MG: 600 TABLET ORAL at 13:11

## 2020-11-12 RX ADMIN — GUAIFENESIN AND CODEINE PHOSPHATE 10 ML: 100; 10 SOLUTION ORAL at 05:20

## 2020-11-12 RX ADMIN — DOCUSATE SODIUM 100 MG: 100 CAPSULE, LIQUID FILLED ORAL at 18:00

## 2020-11-12 RX ADMIN — BUDESONIDE 500 MCG: 0.5 SUSPENSION RESPIRATORY (INHALATION) at 18:08

## 2020-11-12 RX ADMIN — IPRATROPIUM BROMIDE AND ALBUTEROL SULFATE 3 ML: .5; 3 SOLUTION RESPIRATORY (INHALATION) at 06:14

## 2020-11-12 RX ADMIN — ATORVASTATIN CALCIUM 20 MG: 20 TABLET, FILM COATED ORAL at 22:47

## 2020-11-12 RX ADMIN — IPRATROPIUM BROMIDE AND ALBUTEROL SULFATE 3 ML: .5; 3 SOLUTION RESPIRATORY (INHALATION) at 16:00

## 2020-11-12 RX ADMIN — POTASSIUM CHLORIDE 40 MEQ: 1500 TABLET, EXTENDED RELEASE ORAL at 09:43

## 2020-11-12 RX ADMIN — METHYLPREDNISOLONE SODIUM SUCCINATE 40 MG: 40 INJECTION, POWDER, FOR SOLUTION INTRAMUSCULAR; INTRAVENOUS at 22:47

## 2020-11-12 RX ADMIN — MONTELUKAST SODIUM 10 MG: 10 TABLET, FILM COATED ORAL at 22:47

## 2020-11-12 RX ADMIN — POTASSIUM CHLORIDE 40 MEQ: 1500 TABLET, EXTENDED RELEASE ORAL at 13:09

## 2020-11-12 RX ADMIN — METHYLPREDNISOLONE SODIUM SUCCINATE 40 MG: 40 INJECTION, POWDER, FOR SOLUTION INTRAMUSCULAR; INTRAVENOUS at 13:22

## 2020-11-12 RX ADMIN — IPRATROPIUM BROMIDE AND ALBUTEROL SULFATE 3 ML: .5; 3 SOLUTION RESPIRATORY (INHALATION) at 05:10

## 2020-11-12 RX ADMIN — CLONIDINE HYDROCHLORIDE 0.2 MG: 0.1 TABLET ORAL at 17:59

## 2020-11-12 NOTE — ED NOTES
Pt reports unable to give urine specimen at this time. Water, apple juice, and ginger ale brought to bedside.

## 2020-11-12 NOTE — PROGRESS NOTES
CM went in to see patient and complete an initial assessment. Patient sleeping, and would not open her eyes when CM called her name twice.            Gena Vazquez RN  Case Management [phone]

## 2020-11-12 NOTE — ED NOTES
Pt removed leads to monitor and ambulated out of room to nurses station and food cart.  Pt asked if her dinner tray was ready.  Meal tray brought to bedside.

## 2020-11-12 NOTE — ED NOTES
Pt yelling, using profanity, and removing leads to monitor. Pt education, reassurance, therapeutic communication, and calming environment provided. Charge nurse updated with pt condition and charge nurse at bedside.

## 2020-11-12 NOTE — ED NOTES
Meal tray reheated and brought to bedside. Pt declined lidocaine patch, tylenol, zofran, and POC blood glucose. Pt sitting up in bed in no acute distress.

## 2020-11-12 NOTE — ED PROVIDER NOTES
EMERGENCY DEPARTMENT HISTORY AND PHYSICAL EXAM    8:39 PM  Date: 11/11/2020  Patient Name: Tsering Laws    History of Presenting Illness     No chief complaint on file. History Provided By: patient     HPI: Tsering Laws is a 63 y.o. female with past medical history of COPD/asthma, CHF, MI presents with shortness of breath. Patient has had shortness of breath for the past half to 4 days. Was here yesterday but left before treatment completion. Patient complains about fluid in her legs. States that she did not use cocaine today. Denies any fever, denies any cough.        PCP: Yvonne Gaitan NP    Past History     Past Medical History:  Past Medical History:   Diagnosis Date    CHF (congestive heart failure) ([address]     Chronic respiratory failure with hypoxia ([address] 9/7/2020    CKD (chronic kidney disease) stage 3, GFR 30-59 ml/min 10/31/2019    Cocaine abuse ([address] 11/26/2017    COPD (chronic obstructive pulmonary disease) with chronic bronchitis ([address] 12/19/2017    Dependence on supplemental oxygen     Depression 3/22/2020    Drug-seeking behavior 11/19/2016    Endocrine disease     thyroid issues    Essential hypertension 3/10/2017    Fe deficiency anemia 10/27/2012    Fibromyalgia 12/16/2016    Gastroesophageal reflux disease without esophagitis 10/10/2016    Gastrointestinal disorder     \"blockage in my stomach\"    Hypercholesterolemia     Hypertension     Hypertensive heart failure ([address] 12/19/2017    Morbid obesity due to excess calories ([address] 3/10/2017    NSTEMI (non-ST elevated myocardial infarction) ([address] 3/22/2020    On home O2 12/3/2015    Overview:  2L at home per pt for approx 8 years    Polysubstance abuse ([address] 9/15/2018    Respiratory failure ([address] 1/11/2017    S/P hernia repair 10/31/2019    Sleep apnea 12/19/2017    T wave inversion in EKG 3/9/2019    Tobacco use 0/78/3897    Uncomplicated severe persistent asthma 12/13/2016    Vocal cord disease 12/7/2016 Past Surgical History:  Past Surgical History:   Procedure Laterality Date    HX GI      Exploratory laparotomy with lysis of adhesions and primary repair of incarcerated umbilical hernia       Family History:  No family history on file. Social History:  Social History     Tobacco Use    Smoking status: Current Every Day Smoker     Packs/day: 0.25    Smokeless tobacco: Current User   Substance Use Topics    Alcohol use: No     Comment: socially    Drug use: Not Currently     Types: Heroin       Allergies:  No Known Allergies    Review of Systems   Review of Systems   Constitutional: Negative for activity change, appetite change and chills. HENT: Negative for congestion, ear discharge, ear pain and sore throat. Eyes: Negative for photophobia and pain. Respiratory: Positive for shortness of breath. Negative for cough and choking. Cardiovascular: Negative for palpitations and leg swelling. Gastrointestinal: Negative for anal bleeding and rectal pain. Endocrine: Negative for polydipsia and polyuria. Genitourinary: Negative for genital sores and urgency. Musculoskeletal: Negative for arthralgias and myalgias. Neurological: Negative for dizziness, seizures and speech difficulty. Psychiatric/Behavioral: Negative for hallucinations, self-injury and suicidal ideas. Physical Exam     No data found. Physical Exam  Vitals signs and nursing note reviewed. Constitutional:       Appearance: She is well-developed. HENT:      Head: Normocephalic and atraumatic. Eyes:      General:         Right eye: No discharge. Left eye: No discharge. Neck:      Musculoskeletal: Normal range of motion and neck supple. Cardiovascular:      Rate and Rhythm: Normal rate and regular rhythm. Heart sounds: Normal heart sounds. No murmur. Pulmonary:      Effort: Respiratory distress present. Breath sounds: Normal breath sounds. No stridor. No wheezing or rales.       Comments: wheeze  Chest:      Chest wall: No tenderness.   Abdominal:      General: Bowel sounds are normal. There is no distension.      Palpations: Abdomen is soft.      Tenderness: There is no abdominal tenderness. There is no guarding or rebound.   Musculoskeletal: Normal range of motion.   Skin:     General: Skin is warm and dry.   Neurological:      Mental Status: She is alert and oriented to person, place, and time.         Diagnostic Study Results     Labs -  No results found for this or any previous visit (from the past 12 hour(s)).    Radiologic Studies -   No results found.      Medical Decision Making     ED Course: Progress Notes, Reevaluation, and Consults:    8:40 PM Initial assessment performed. The patients presenting problems have been discussed, and they/their family are in agreement with the care plan formulated and outlined with them.  I have encouraged them to ask questions as they arise throughout their visit.    Provider Notes (Medical Decision Making):   Patient with past medical history of asthma COPD  Patient is wheezing   Seems to be in some distress.  We will get 3 duo nebs steroids magnesium and reassess  Not hypoxic.  Mild improvement after DuoNeb's magnesium and steroids  X-ray chest no pneumonia as reviewed by me  Labs reviewed by me no leukocytosis  Initially planned to discharge fina however was called because patient was feeling SOB.  Patient tachypneic to 30s, wheezing persistent after fourth albuterol treatment given  Patient will be admitted for asthma/COPD exacerbation to observation  Discussed with Dr. Ramos  Vital Signs-Reviewed the patient's vital signs. Reviewed pt's pulse ox reading.         Records Reviewed:  old medical records(Time of Review: 8:40 PM)  -I am the first provider for this patient.  -I reviewed the vital signs, available nursing notes, past medical history, past surgical history, family history and social history.    Current Facility-Administered Medications  Medication Dose Route Frequency Provider Last Rate Last Dose    albuterol-ipratropium (DUO-NEB) 2.5 MG-0.5 MG/3 ML  3 mL Nebulization Q15MIN Zen Mahajan MD        magnesium sulfate injection 2 g  2 g IntraVENous NOW Zen Mahajan MD        methylPREDNISolone (PF) (Solu-MEDROL) injection 125 mg  125 mg IntraVENous NOW Zen Mahajan MD        furosemide (LASIX) injection 40 mg  40 mg IntraVENous NOW Zen Mahajan MD         Current Outpatient Medications   Medication Sig Dispense Refill    predniSONE (STERAPRED DS) 10 mg dose pack 6 day dose pack per package instructions 1 Package 0    doxycycline (MONODOX) 100 mg capsule Take 1 Cap by mouth two (2) times a day for 7 days. 14 Cap 0    albuterol sulfate 90 mcg/actuation aebs Take 2 Puffs by inhalation every four to six (4-6) hours as needed for Wheezing for up to 30 days. 1 Inhaler 0    cloNIDine HCL (CATAPRES) 0.2 mg tablet Take 1 Tab by mouth two (2) times a day for 30 days. 60 Tab 0    nitroglycerin (NITROSTAT) 0.4 mg SL tablet Take 1 Tab by mouth every five (5) minutes as needed for Chest Pain. Sit/Lay down then put one tab under the tongue every 5 minutes as needed for chest pain for 3 doses 1 Bottle 0    albuterol-ipratropium (DUO-NEB) 2.5 mg-0.5 mg/3 ml nebu 3 mL by Nebulization route every four (4) hours as needed for Wheezing, Shortness of Breath or Cough. 30 Nebule 0    arformoteroL (BROVANA) 15 mcg/2 mL nebu neb solution 2 mL by Nebulization route two (2) times a day. Indications: bronchospasm prevention with COPD 60 Vial 0    budesonide (PULMICORT) 0.5 mg/2 mL nbsp 2 mL by Nebulization route two (2) times a day. 60 Each 0    fluticasone furoate-vilanteroL (Breo Ellipta) 200-25 mcg/dose inhaler Take 1 Puff by inhalation daily. 1 Inhaler 0    citalopram (CeleXA) 20 mg tablet 1 tablet      gemfibroziL (LOPID) 600 mg tablet 1 tablet      sucralfate (CARAFATE) 100 mg/mL suspension Take 1,000 mg by mouth.       QUEtiapine (SEROquel) 50 mg tablet TAKE 1 TABLET BY MOUTH AT BEDTIME FOR MOOD      ALPRAZolam (Xanax) 0.5 mg tablet Take 1 Tab by mouth every eight (8) hours as needed for Anxiety. Max Daily Amount: 1.5 mg. 20 Tab 0    aspirin delayed-release 81 mg tablet Take 1 Tab by mouth daily. 30 Tab 0    famotidine (PEPCID) 20 mg tablet Take 1 Tab by mouth daily. 30 Tab 1    OXYGEN-AIR DELIVERY SYSTEMS 3 L by IntraNASal route as needed for Other (sob).  docusate sodium (COLACE) 100 mg capsule Take 1 Cap by mouth two (2) times a day. 120 Cap 0    atorvastatin (LIPITOR) 20 mg tablet Take 1 Tab by mouth nightly. 30 Tab 0    roflumilast (DALIRESP) 500 mcg tab tablet Take 1 Tab by mouth daily. 30 Tab 0    naloxone (NARCAN) 4 mg/actuation nasal spray Use 1 spray intranasally, then discard. Repeat with new spray every 2 min as needed for opioid overdose symptoms, alternating nostrils. 1 Each 0    montelukast (SINGULAIR) 10 mg tablet Take 1 Tab by mouth nightly. 30 Tab 1        Clinical Impression     Clinical Impression: No diagnosis found. Disposition: admit        This note was dictated utilizing voice recognition software which may lead to typographical errors. I apologize in advance if the situation occurs. If questions arise please do not hesitate to contact me or call our department.     Irais Matamoros MD  8:40 PM

## 2020-11-12 NOTE — ACP (ADVANCE CARE PLANNING)
Advance Care Planning     General Advance Care Planning (ACP) Conversation      Date of Conversation: 11/12/2020    Conducted with: Healthcare Decision Maker: Named in Advance Directive or Healthcare Power of [address] Fang:       Supplemental (Other) Decision Maker: Shaniqua Agustin - Daughter - [phone]     Click here to [address] including selection of the Healthcare Decision Maker Relationship (ie \"Primary\")  Today we documented Decision Maker(s) consistent with ACP documents on file. Content/Action Overview:      Has ACP document(s) on file - reflects the patient's care preferences      Length of Voluntary ACP Conversation in minutes:  20 seconds    Erica Schwartz

## 2020-11-12 NOTE — PROGRESS NOTES
[facility]ist Group  Progress Note    Patient: Kevon Silva Age: 63 y.o. : 1965 MR#: [id number] SSN: [id number]  Date: 2020     Subjective:   As I entered the  room, the patient was washing her clothes in the sink, she said \"I feel like s. .. \"'  and walked out of the room to use the restroom. Thus  ROS and PE not feasible. She remained on room air the entire time,  proceeded to walk around the ED and I could hear the patient being very loud with ED staff, able to speak in complete sentences and ambulate without dyspnea. I discussed the case with ED RN, we will not give her any opioids at this time. Recommend security assistance if there is any aggressive behavior. Assessment/Plan:   53years old presented complaint of having shortness of breath and wheezing for 4 days. CONSULTS:     1. COPD with acute exacerbation   2. hypokalemia   3. DEMARCUS - Cr from 0.80 to 1.77 in less than 24 hours   4. Chronic respiratory failure on home O2 4LNC  5. Chronic diastolic CHF- compensated. Normal probnp  6. HTN   7. Repaired incarcerated hernia   8. Tobacco abuse - cessation a few months ago   9. Cocaine and heroine abuse   10. Hx of MI   11. Hx of noncompliance   12. Hx of hepatitis C     - continue steroids, duonebs, pulmicort, singulair, daliresp. She appears very stable on room air currently, supplemental o2 as needed. Bronchial hygiene protocol. Consult to pulm rehab. ISS for steroid induced hyperglycemia   - no evidence of hypotension, non contrast given,  She got 40 mg IV lasix in the ED  Hold on further diuresis. Hold on any IVFs. monitor renal function. Avoid nephrotoxins. Will check UA. Her Cr continues to increase, I will consult Dr Manolo Lewis   - given 80 meq oral K, repeat lytes in AM and replete as needed  - avoid opioids . Check UDS stat.    - xanax as needed for anxiety   - clonidine for BP   - the patient has medical capacity to make decisions for herself and can leave AMA if she wishes. She has left A[facility] from [facility] in the past.   - encourage incentive nathan    FULL CODE     Additional Notes:      Case discussed with:  [x]Patient  []Family  [x]Nursing  []Case Management  DVT Prophylaxis:  [x]Lovenox  []Hep SQ  []SCDs  []Coumadin   []On Heparin gtt    Objective:   VS:   Visit Vitals  BP (!) 114/52   Pulse 87   Temp 98.1 °F (36.7 °C)   Resp 20   LMP 2009   SpO2 95%      Tmax/24hrs: Temp (24hrs), Av.1 °F (36.7 °C), Min:98 °F (36.7 °C), Max:98.1 °F (36.7 °C)  No intake or output data in the 24 hours ending 20 1026    General:  Walking around the ED,  NAD   Cardiovascular:  RRR  Pulmonary:  On room air, speaks in complete sentences, no cyanosis, no accessory muscle use. I was not able to listen to her lungs, no audible wheezing noted from a short distance   Neuro: awake, alert, ox3, moving arms legs, normal gait, follows commands from RN   Upset. Noncooperative. Labs:    Recent Results (from the past 24 hour(s))   CBC WITH AUTOMATED DIFF    Collection Time: 20  8:55 PM   Result Value Ref Range    WBC 12.2 4.6 - 13.2 K/uL    RBC 4.31 4.20 - 5.30 M/uL    HGB 12.8 12.0 - 16.0 g/dL    HCT 38.5 35.0 - 45.0 %    MCV 89.3 74.0 - 97.0 FL    MCH 29.7 24.0 - 34.0 PG    MCHC 33.2 31.0 - 37.0 g/dL    RDW 12.9 11.6 - 14.5 %    PLATELET 730 514 - 746 K/uL    MPV 10.9 9.2 - 11.8 FL    NEUTROPHILS 81 (H) 40 - 73 %    LYMPHOCYTES 13 (L) 21 - 52 %    MONOCYTES 6 3 - 10 %    EOSINOPHILS 0 0 - 5 %    BASOPHILS 0 0 - 2 %    ABS. NEUTROPHILS 10.0 (H) 1.8 - 8.0 K/UL    ABS. LYMPHOCYTES 1.5 0.9 - 3.6 K/UL    ABS. MONOCYTES 0.7 0.05 - 1.2 K/UL    ABS. EOSINOPHILS 0.0 0.0 - 0.4 K/UL    ABS.  BASOPHILS 0.0 0.0 - 0.1 K/UL    DF AUTOMATED     METABOLIC PANEL, COMPREHENSIVE    Collection Time: 20  8:55 PM   Result Value Ref Range    Sodium 139 136 - 145 mmol/L    Potassium 3.2 (L) 3.5 - 5.5 mmol/L    Chloride 101 100 - 111 mmol/L    CO2 28 21 - 32 mmol/L    Anion gap 10 3.0 - 18 mmol/L    Glucose 144 (H) 74 - 99 mg/dL    BUN 23 (H) 7.0 - 18 MG/DL    Creatinine 1.77 (H) 0.6 - 1.3 MG/DL    BUN/Creatinine ratio 13 12 - 20      GFR est AA 36 (L) >60 ml/min/1.73m2    GFR est non-AA 30 (L) >60 ml/min/1.73m2    Calcium 9.3 8.5 - 10.1 MG/DL    Bilirubin, total 0.3 0.2 - 1.0 MG/DL    ALT (SGPT) 17 13 - 56 U/L    AST (SGOT) 11 10 - 38 U/L    Alk.  phosphatase 80 45 - 117 U/L    Protein, total 6.8 6.4 - 8.2 g/dL    Albumin 3.8 3.4 - 5.0 g/dL    Globulin 3.0 2.0 - 4.0 g/dL    A-G Ratio 1.3 0.8 - 1.7     CARDIAC PANEL,(CK, CKMB & TROPONIN)    Collection Time: 11/11/20  8:55 PM   Result Value Ref Range    CK - MB 2.1 <3.6 ng/ml    CK-MB Index 3.2 0.0 - 4.0 %    CK 65 26 - 192 U/L    Troponin-I, QT <0.02 0.0 - 0.045 NG/ML   NT-PRO BNP    Collection Time: 11/11/20  8:55 PM   Result Value Ref Range    NT pro- 0 - 900 PG/ML   MAGNESIUM    Collection Time: 11/11/20  8:55 PM   Result Value Ref Range    Magnesium 2.0 1.6 - 2.6 mg/dL   EKG, 12 LEAD, INITIAL    Collection Time: 11/12/20  8:34 AM   Result Value Ref Range    Ventricular Rate 78 BPM    Atrial Rate 78 BPM    P-R Interval 132 ms    QRS Duration 88 ms    Q-T Interval 426 ms    QTC Calculation (Bezet) 485 ms    Calculated P Axis 72 degrees    Calculated R Axis 46 degrees    Calculated T Axis 23 degrees    Diagnosis       Normal sinus rhythm  Minimal voltage criteria for LVH, may be normal variant  T wave abnormality, consider inferior ischemia  Prolonged QT  Abnormal ECG  When compared with ECG of 10-NOV-2020 19:19,  No significant change was found         Signed By: DARCIE Crawford     November 12, 2020

## 2020-11-12 NOTE — DISCHARGE INSTRUCTIONS
Patient Education   Patient Education        Asthma in Adults: Care Instructions  Your Care Instructions     During an asthma attack, your airways swell and narrow as a reaction to certain things (triggers). This makes it hard to breathe. You may be able to prevent asthma attacks if you avoid the things that set off your asthma symptoms. Keeping your asthma under control and treating symptoms before they get bad can help you avoid severe attacks. If you can control your asthma, you may be able to do all of your normal daily activities. You may also avoid asthma attacks and trips to the hospital.  Follow-up care is a key part of your treatment and safety. Be sure to make and go to all appointments, and call your doctor if you are having problems. It's also a good idea to know your test results and keep a list of the medicines you take. How can you care for yourself at home? · Follow your asthma action plan so you can manage your symptoms at home. An asthma action plan will help you prevent and control airway reactions and will tell you what to do during an asthma attack. If you do not have an asthma action plan, work with your doctor to build one. · Take your asthma medicine exactly as prescribed. Medicine plays an important role in controlling asthma. Talk to your doctor right away if you have any questions about what to take and how to take it. ? Use your quick-relief medicine when you have symptoms of an attack. Quick-relief medicine often is an albuterol inhaler. Some people need to use quick-relief medicine before they exercise. ? Take your controller medicine every day, not just when you have symptoms. Controller medicine is usually an inhaled corticosteroid. The goal is to prevent problems before they occur. Do not use your controller medicine to try to treat an attack that has already started. It does not work fast enough to help.   ? If your doctor prescribed corticosteroid pills to use during an attack, take them as directed. They may take hours to work, but they may shorten the attack and help you breathe better. ? Keep your quick-relief medicine with you at all times. · Talk to your doctor before using other medicines. Some medicines, such as aspirin, can cause asthma attacks in some people. · Check yourself for asthma symptoms to know which step to follow in your action plan. Watch for things like being short of breath, having chest tightness, coughing, and wheezing. Also notice if symptoms wake you up at night or if you get tired quickly when you exercise. · If you have a peak flow meter, use it to check how well you are breathing. This can help you predict when an asthma attack is going to occur. Then you can take medicine to prevent the asthma attack or make it less severe. · See your doctor regularly. These visits will help you learn more about asthma and what you can do to control it. Your doctor will monitor your treatment to make sure the medicine is helping you. · Keep track of your asthma attacks and your treatment. After you have had an attack, write down what triggered it, what helped end it, and any concerns you have about your asthma action plan. Take your diary when you see your doctor. You can then review your asthma action plan and decide if it is working. · Do not smoke or allow others to smoke around you. Avoid smoky places. Smoking makes asthma worse. If you need help quitting, talk to your doctor about stop-smoking programs and medicines. These can increase your chances of quitting for good. · Learn what triggers an asthma attack for you, and avoid the triggers when you can. Common triggers include colds, smoke, air pollution, dust, pollen, mold, pets, cockroaches, stress, and cold air. · Avoid colds and the flu. Get a pneumococcal vaccine shot. If you have had one before, ask your doctor whether you need a second dose. Get a flu vaccine every fall.  If you must be around people with colds or the flu, wash your hands often. When should you call for help? Call 911 anytime you think you may need emergency care. For example, call if:    · You have severe trouble breathing. Call your doctor now or seek immediate medical care if:    · Your symptoms do not get better after you have followed your asthma action plan.     · You cough up yellow, dark brown, or bloody mucus (sputum). Watch closely for changes in your health, and be sure to contact your doctor if:    · Your coughing and wheezing get worse.     · You need to use quick-relief medicine on more than 2 days a week (unless it is just for exercise).     · You need help figuring out what is triggering your asthma attacks. Where can you learn more? Go to http://www.gray.com/  Enter P597 in the search box to learn more about \"Asthma in Adults: Care Instructions. \"  Current as of: February 24, 2020               Content Version: 12.6  © 2006-2020 [employer]. Care instructions adapted under license by [employer] (which disclaims liability or warranty for this information). If you have questions about a medical condition or this instruction, always ask your healthcare professional. [address] any warranty or liability for your use of this information.

## 2020-11-12 NOTE — H&P
Admission History and Physical    Veryl Ethan is a 63 y.o. female who is being admitted. Chief Complaint   Patient presents with    Shortness of Breath       Impression/Plan     53years old presented complaint of having shortness of breath and wheezing    -COPD exacerbation  -Chronic respiratory failure, was on home oxygen  -History of incarcerated hernia, repaired  -History of chronic CHF  -Hx Tobacco use   -History of drug use, including cocaine and heroine  -Essential hypertension  -Other medical problems as below    The patient is admitted for observation  Continue nebs  Oxygen  IV Solumedrol   Continue home antihypertensives and monitor blood pressure  Continue home medications and outpatient follow-up    Admission plan was discussed    Lovenox for DVT prophylaxis      HPI:    63 y.o. female who is being admitted for COPD/asthma exacerbation. She gives medical history significant for COPD/asthma, CHF, and history of MI. She presented to the ED with complaint of having shortness of breath and wheezing, with no chest pain, cough or fever. Symptoms were present for 4 days. She received several nebs in the ED however continue to report shortness of breath and was wheezing. She also received IV Solu-Medrol. Observation admission was requested. She says she stopped smoking months ago. She has no other concerns.       Past Medical History:   Diagnosis Date    CHF (congestive heart failure) (HCC)     Chronic respiratory failure with hypoxia ([facility] 9/7/2020    CKD (chronic kidney disease) stage 3, GFR 30-59 ml/min 10/31/2019    Cocaine abuse ([address] 11/26/2017    COPD (chronic obstructive pulmonary disease) with chronic bronchitis ([facility] 12/19/2017    Dependence on supplemental oxygen     Depression 3/22/2020    Drug-seeking behavior 11/19/2016    Endocrine disease     thyroid issues    Essential hypertension 3/10/2017    Fe deficiency anemia 10/27/2012    Fibromyalgia 12/16/2016    Gastroesophageal reflux disease without esophagitis 10/10/2016    Gastrointestinal disorder     \"blockage in my stomach\"    Hypercholesterolemia     Hypertension     Hypertensive heart failure ([facility] 12/19/2017    Morbid obesity due to excess calories ([facility] 3/10/2017    NSTEMI (non-ST elevated myocardial infarction) ([facility] 3/22/2020    On home O2 12/3/2015    Overview:  2L at home per pt for approx 8 years    Polysubstance abuse ([facility] 9/15/2018    Respiratory failure ([facility] 1/11/2017    S/P hernia repair 10/31/2019    Sleep apnea 12/19/2017    T wave inversion in EKG 3/9/2019    Tobacco use 8/59/4654    Uncomplicated severe persistent asthma 12/13/2016    Vocal cord disease 12/7/2016     Past Surgical History:   Procedure Laterality Date    HX GI      Exploratory laparotomy with lysis of adhesions and primary repair of incarcerated umbilical hernia     No Known Allergies  FH HTN  Social, denies recent recreational drug     Home Medications:     No current facility-administered medications on file prior to encounter. Current Outpatient Medications on File Prior to Encounter   Medication Sig Dispense Refill    predniSONE (STERAPRED DS) 10 mg dose pack 6 day dose pack per package instructions 1 Package 0    doxycycline (MONODOX) 100 mg capsule Take 1 Cap by mouth two (2) times a day for 7 days. 14 Cap 0    albuterol sulfate 90 mcg/actuation aebs Take 2 Puffs by inhalation every four to six (4-6) hours as needed for Wheezing for up to 30 days. 1 Inhaler 0    cloNIDine HCL (CATAPRES) 0.2 mg tablet Take 1 Tab by mouth two (2) times a day for 30 days. 60 Tab 0    nitroglycerin (NITROSTAT) 0.4 mg SL tablet Take 1 Tab by mouth every five (5) minutes as needed for Chest Pain.  Sit/Lay down then put one tab under the tongue every 5 minutes as needed for chest pain for 3 doses 1 Bottle 0    albuterol-ipratropium (DUO-NEB) 2.5 mg-0.5 mg/3 ml nebu 3 mL by Nebulization route every four (4) hours as needed for Wheezing, Shortness of Breath or Cough. 30 Nebule 0    arformoteroL (BROVANA) 15 mcg/2 mL nebu neb solution 2 mL by Nebulization route two (2) times a day. Indications: bronchospasm prevention with COPD 60 Vial 0    budesonide (PULMICORT) 0.5 mg/2 mL nbsp 2 mL by Nebulization route two (2) times a day. 60 Each 0    fluticasone furoate-vilanteroL (Breo Ellipta) 200-25 mcg/dose inhaler Take 1 Puff by inhalation daily. 1 Inhaler 0    citalopram (CeleXA) 20 mg tablet 1 tablet      gemfibroziL (LOPID) 600 mg tablet 1 tablet      sucralfate (CARAFATE) 100 mg/mL suspension Take 1,000 mg by mouth.  QUEtiapine (SEROquel) 50 mg tablet TAKE 1 TABLET BY MOUTH AT BEDTIME FOR MOOD      ALPRAZolam (Xanax) 0.5 mg tablet Take 1 Tab by mouth every eight (8) hours as needed for Anxiety. Max Daily Amount: 1.5 mg. 20 Tab 0    aspirin delayed-release 81 mg tablet Take 1 Tab by mouth daily. 30 Tab 0    famotidine (PEPCID) 20 mg tablet Take 1 Tab by mouth daily. 30 Tab 1    OXYGEN-AIR DELIVERY SYSTEMS 3 L by IntraNASal route as needed for Other (sob).  docusate sodium (COLACE) 100 mg capsule Take 1 Cap by mouth two (2) times a day. 120 Cap 0    atorvastatin (LIPITOR) 20 mg tablet Take 1 Tab by mouth nightly. 30 Tab 0    roflumilast (DALIRESP) 500 mcg tab tablet Take 1 Tab by mouth daily. 30 Tab 0    naloxone (NARCAN) 4 mg/actuation nasal spray Use 1 spray intranasally, then discard. Repeat with new spray every 2 min as needed for opioid overdose symptoms, alternating nostrils. 1 Each 0    montelukast (SINGULAIR) 10 mg tablet Take 1 Tab by mouth nightly. 30 Tab 1       Review of Systems:   GEN  no fever or chills,  CV  no chest pain,   or syncope.    PULM  see HPI  GI  no abd pain, no melena, no nausea, no vomiting     no dysuria, no flank pain   M/S- no  back pain, no neck pain   SKIN  no rashes, no bruising   ENDO  no temp intolerance, no polyuria, no polydypsia   NEURO  no focal weakness, no speech changes   PSYCH  no depression, no anxiety, no hallucinations   HEENT  no headache,  no double vision, no neck pain     Physical Assessment:     Visit Vitals  BP (!) 131/55   Pulse 75   Temp 98 °F (36.7 °C)   Resp 16   LMP 04/14/2009   SpO2 95%     Constitutional: The patient is oriented to person, place, and time. No distress. Eyes: No scleral icterus. Neck: No JVD present. Cardiovascular: Regular rhythm. Exam reveals no gallop and no friction rub. Pulmonary/Chest: Has bilateral expiratory wheezes. has no rales. Abdominal: Soft. Bowel sounds are normal. exhibits no distension. No tenderness. No rebound and no guarding. Musculoskeletal:Normal strength. exhibits no tenderness. Neurological:alert and oriented to person, place, and time. Skin: Skin is warm and dry. Psychiatric: Cooperative, no agitation    Recent Results (from the past 24 hour(s))   CBC WITH AUTOMATED DIFF    Collection Time: 11/11/20  8:55 PM   Result Value Ref Range    WBC 12.2 4.6 - 13.2 K/uL    RBC 4.31 4.20 - 5.30 M/uL    HGB 12.8 12.0 - 16.0 g/dL    HCT 38.5 35.0 - 45.0 %    MCV 89.3 74.0 - 97.0 FL    MCH 29.7 24.0 - 34.0 PG    MCHC 33.2 31.0 - 37.0 g/dL    RDW 12.9 11.6 - 14.5 %    PLATELET 395 705 - 773 K/uL    MPV 10.9 9.2 - 11.8 FL    NEUTROPHILS 81 (H) 40 - 73 %    LYMPHOCYTES 13 (L) 21 - 52 %    MONOCYTES 6 3 - 10 %    EOSINOPHILS 0 0 - 5 %    BASOPHILS 0 0 - 2 %    ABS. NEUTROPHILS 10.0 (H) 1.8 - 8.0 K/UL    ABS. LYMPHOCYTES 1.5 0.9 - 3.6 K/UL    ABS. MONOCYTES 0.7 0.05 - 1.2 K/UL    ABS. EOSINOPHILS 0.0 0.0 - 0.4 K/UL    ABS.  BASOPHILS 0.0 0.0 - 0.1 K/UL    DF AUTOMATED     METABOLIC PANEL, COMPREHENSIVE    Collection Time: 11/11/20  8:55 PM   Result Value Ref Range    Sodium 139 136 - 145 mmol/L    Potassium 3.2 (L) 3.5 - 5.5 mmol/L    Chloride 101 100 - 111 mmol/L    CO2 28 21 - 32 mmol/L    Anion gap 10 3.0 - 18 mmol/L    Glucose 144 (H) 74 - 99 mg/dL    BUN 23 (H) 7.0 - 18 MG/DL    Creatinine 1.77 (H) 0.6 - 1.3 MG/DL    BUN/Creatinine ratio 13 12 - 20      GFR est AA 36 (L) >60 ml/min/1.73m2    GFR est non-AA 30 (L) >60 ml/min/1.73m2    Calcium 9.3 8.5 - 10.1 MG/DL    Bilirubin, total 0.3 0.2 - 1.0 MG/DL    ALT (SGPT) 17 13 - 56 U/L    AST (SGOT) 11 10 - 38 U/L    Alk. phosphatase 80 45 - 117 U/L    Protein, total 6.8 6.4 - 8.2 g/dL    Albumin 3.8 3.4 - 5.0 g/dL    Globulin 3.0 2.0 - 4.0 g/dL    A-G Ratio 1.3 0.8 - 1.7     CARDIAC PANEL,(CK, CKMB & TROPONIN)    Collection Time: 11/11/20  8:55 PM   Result Value Ref Range    CK - MB 2.1 <3.6 ng/ml    CK-MB Index 3.2 0.0 - 4.0 %    CK 65 26 - 192 U/L    Troponin-I, QT <0.02 0.0 - 0.045 NG/ML   NT-PRO BNP    Collection Time: 11/11/20  8:55 PM   Result Value Ref Range    NT pro- 0 - 900 PG/ML          Diagnostic Studies:  CXR:   No acute cardiopulmonary process.

## 2020-11-12 NOTE — ED NOTES
Received care of pt. Pt ambulated to samuels and back to ED room. Pt tolerated ambulation well. New linens and gown provided. Meal tray reheated and brought to bedside.

## 2020-11-12 NOTE — ED NOTES
Pt refusing to leave. States she does not feel well. Dr. Ivelisse Zarco aware. Continuing to monitor patient for changes in status.

## 2020-11-12 NOTE — PROGRESS NOTES
Reason for Admission:   Asthma exacerbation [J45.901]  COPD (chronic obstructive pulmonary disease) ([facility] [J44.9]               RUR Score:     97%             Resources/supports as identified by patient/family:       Top Challenges facing patient (as identified by patient/family and CM): Patient is SOB a lot. Finances/Medication cost?     Patient has Medicaid. Transportation      Medicaid transport  Support system or lack thereof? Family and friends. Living arrangements? Patient lives alone. Self-care/ADLs/Cognition? Patient is Self-care. Current Advanced Directive/Advance Care Plan:   yes                          Plan for utilizing home health:    no, not at this time. Patient has been ambulating the ED most of the day without Oxygen and without medical equipment. Likelihood of readmission:   HIGH    Transition of Care Plan:                    Initial assessment completed with relative(s), Tanner Herr, patient's daughter via telephone. Cognitive status of patient: Unable to assess at this time, patient was sleeping when CM went to the bedside. Face sheet information confirmed:  yes. The patient's daughter, Tanner Herr [phone] agrees to participate in her discharge plan and to receive any needed information. This patient lives alone  in a single family home, with 3 steps to enter . Patient is able to navigate steps as needed. Prior to hospitalization, patient was considered to be independent with ADLs/IADLS : yes . Patient has a current ACP document on file: yes. Medicaid transport will be needed to transport patient home upon discharge. The patient already has none reported, and Oxygen Concentrators (no portables)  by [employer]. medical equipment available in the home. Patient is not currently active with home health. Patient has not stayed in a skilled nursing facility or rehab.       This patient is on dialysis :no.    Currently, the discharge plan is Home. The patient states that she can obtain her medications from the pharmacy, and take her medications as directed. Patient's current insurance is [address] [employer]. Care Management Interventions  PCP Verified by CM: Yes  Mode of Transport at Discharge:  Other (see comment)(Medicaid transport will be needed to transport patient home at time of discharge.)  Transition of Care Consult (CM Consult): Discharge Planning  Discharge Durable Medical Equipment: No  Physical Therapy Consult: No  Occupational Therapy Consult: No  Speech Therapy Consult: No  Current Support Network: Lives Alone  Confirm Follow Up Transport: Other (see comment)(Medicaid transport)  Discharge Location  Discharge Placement:  Millie Sr RN  Case Management [phone]

## 2020-11-12 NOTE — ED NOTES
Pt declined breathing treatment when closed door during treatment per hospital policy education and information. Pt in no acute distress with unlabored breathing. Pt ambulated to room door and physically blocking with foot. Charge nurse notified and at bedside for pt education.

## 2020-11-13 VITALS
OXYGEN SATURATION: 95 % | DIASTOLIC BLOOD PRESSURE: 78 MMHG | RESPIRATION RATE: 16 BRPM | SYSTOLIC BLOOD PRESSURE: 154 MMHG | TEMPERATURE: 97.4 F | HEART RATE: 80 BPM

## 2020-11-13 LAB
AMPHET UR QL SCN: NEGATIVE
APPEARANCE UR: CLEAR
BARBITURATES UR QL SCN: NEGATIVE
BENZODIAZ UR QL: POSITIVE
BILIRUB UR QL: NEGATIVE
CANNABINOIDS UR QL SCN: NEGATIVE
COCAINE UR QL SCN: POSITIVE
COLOR UR: YELLOW
GLUCOSE UR STRIP.AUTO-MCNC: NEGATIVE MG/DL
HDSCOM,HDSCOM: ABNORMAL
HGB UR QL STRIP: NEGATIVE
KETONES UR QL STRIP.AUTO: NEGATIVE MG/DL
LEUKOCYTE ESTERASE UR QL STRIP.AUTO: NEGATIVE
METHADONE UR QL: NEGATIVE
NITRITE UR QL STRIP.AUTO: NEGATIVE
OPIATES UR QL: POSITIVE
PCP UR QL: NEGATIVE
PH UR STRIP: 5 [PH] (ref 5–8)
PROT UR STRIP-MCNC: NEGATIVE MG/DL
SP GR UR REFRACTOMETRY: 1.02 (ref 1–1.03)
UROBILINOGEN UR QL STRIP.AUTO: 0.2 EU/DL (ref 0.2–1)

## 2020-11-13 PROCEDURE — 2709999900 HC NON-CHARGEABLE SUPPLY

## 2020-11-13 PROCEDURE — 74011250636 HC RX REV CODE- 250/636: Performed by: PHYSICIAN ASSISTANT

## 2020-11-13 PROCEDURE — 74011000250 HC RX REV CODE- 250: Performed by: INTERNAL MEDICINE

## 2020-11-13 PROCEDURE — 74011250637 HC RX REV CODE- 250/637: Performed by: INTERNAL MEDICINE

## 2020-11-13 PROCEDURE — 99218 HC RM OBSERVATION: CPT

## 2020-11-13 RX ORDER — GUAIFENESIN 600 MG/1
600 TABLET, EXTENDED RELEASE ORAL EVERY 12 HOURS
Status: DISCONTINUED | OUTPATIENT
Start: 2020-11-13 | End: 2020-11-13 | Stop reason: HOSPADM

## 2020-11-13 RX ADMIN — DOCUSATE SODIUM 100 MG: 100 CAPSULE, LIQUID FILLED ORAL at 09:13

## 2020-11-13 RX ADMIN — ROFLUMILAST 500 MCG: 500 TABLET ORAL at 09:14

## 2020-11-13 RX ADMIN — Medication 81 MG: at 09:14

## 2020-11-13 RX ADMIN — GUAIFENESIN 600 MG: 600 TABLET, EXTENDED RELEASE ORAL at 05:50

## 2020-11-13 RX ADMIN — GEMFIBROZIL 300 MG: 600 TABLET ORAL at 09:14

## 2020-11-13 RX ADMIN — CLONIDINE HYDROCHLORIDE 0.2 MG: 0.1 TABLET ORAL at 09:13

## 2020-11-13 RX ADMIN — Medication 10 ML: at 05:07

## 2020-11-13 RX ADMIN — CITALOPRAM HYDROBROMIDE 20 MG: 20 TABLET ORAL at 09:14

## 2020-11-13 RX ADMIN — IPRATROPIUM BROMIDE AND ALBUTEROL SULFATE 3 ML: .5; 3 SOLUTION RESPIRATORY (INHALATION) at 05:02

## 2020-11-13 RX ADMIN — METHYLPREDNISOLONE SODIUM SUCCINATE 40 MG: 40 INJECTION, POWDER, FOR SOLUTION INTRAMUSCULAR; INTRAVENOUS at 05:06

## 2020-11-13 RX ADMIN — ALPRAZOLAM 0.5 MG: 0.5 TABLET ORAL at 09:17

## 2020-11-13 NOTE — PROGRESS NOTES
Problem: Falls - Risk of  Goal: *Absence of Falls  Description: Document Dunham Reason Fall Risk and appropriate interventions in the flowsheet.   Outcome: Resolved/Met     Problem: Patient Education: Go to Patient Education Activity  Goal: Patient/Family Education  Outcome: Resolved/Met

## 2020-11-13 NOTE — PROGRESS NOTES
Problem: Falls - Risk of  Goal: *Absence of Falls  Description: Document Leafy Crook Fall Risk and appropriate interventions in the flowsheet.   Outcome: Progressing Towards Goal  Note: Fall Risk Interventions:     Medication Interventions: Teach patient to arise slowly, Patient to call before getting OOB, Bed/chair exit alarm    History of Falls Interventions: Room close to nurse's station, Bed/chair exit alarm    Problem: Patient Education: Go to Patient Education Activity  Goal: Patient/Family Education  Outcome: Progressing Towards Goal

## 2020-11-13 NOTE — PROGRESS NOTES
Bedside and Verbal shift change report given to [facility] RN by [facility]. Report included information from the following: SBAR, Kardex, and MAR.

## 2020-11-13 NOTE — ROUTINE PROCESS
0935: Pt standing in the samuels yelling \"Does anyone know who is my nurse? \" I told the patient that I was, she said, \"I just want to let you know I'm leaving. \" I informed the patient that she would have to sign out AMA. As I was preparing the paper, patient said she doesn't need to sign it. She walked off of unit with the hospitals blanket.

## 2020-11-13 NOTE — PROGRESS NOTES
[facility]  Progress Note    Patient: Adrián Walker Age: 63 y.o. : 1965 MR#: [id number] SSN: [id number]  Date: 2020     Subjective:   States she does not feel well, breathing not better. Assessment/Plan:   53years old presented complaint of having shortness of breath and wheezing for 4 days. CONSULTS:     1. COPD with acute exacerbation   2. hypokalemia   3. DEMARCUS - Cr from 0.80 to 1.77 in less than 24 hours   4. Chronic respiratory failure on home O2 4LNC  5. Chronic diastolic CHF- compensated. Normal probnp  6. HTN   7. Repaired incarcerated hernia   8. Tobacco abuse - cessation a few months ago   9. Cocaine, benzos, opiates in UDS,  heroine abuse in past   10. Hx of MI   11. Hx of noncompliance   12. Hx of hepatitis C     - continue steroids (will wean today), duonebs, pulmicort, singulair, daliresp. She is stable on room air currently, supplemental o2 as needed. Bronchial hygiene protocol. Consult to pulm rehab. ISS for steroid induced hyperglycemia   Transition to oral steroids tomorrow, if possible. - avoid opiods. Use lidocaine patch for back pain. Tylenol okay. Avoid ibuprofen due to DEMARCUS. UDS shows cocaine, benzos and opiates (to note, she did get percocet on admission in ED)   - follow up on Cr and K levels- AM labs still pending.   - xanax as needed for anxiety   - clonidine for BP   - encourage incentive nathan  - encourage OOB   - anticipate discharge home  Tomorrow?      FULL CODE     Additional Notes:      Case discussed with:  [x]Patient  []Family  []Nursing  []Case Management  DVT Prophylaxis:  []Lovenox  []Hep SQ  [x]SCDs  []Coumadin   []On Heparin gtt    Objective:   VS:   Visit Vitals  BP (!) 150/77 (BP 1 Location: Right arm, BP Patient Position: At rest)   Pulse 77   Temp 97.2 °F (36.2 °C)   Resp 19   LMP 2009   SpO2 98%      Tmax/24hrs: Temp (24hrs), Av.4 °F (36.3 °C), Min:97 °F (36.1 °C), Max:98.1 °F (36.7 °C)      Intake/Output Summary (Last 24 hours) at 11/13/2020 0617  Last data filed at 11/13/2020 0556  Gross per 24 hour   Intake 480 ml   Output 400 ml   Net 80 ml       General:  Sleeping laying in bed,   NAD   Cardiovascular:  RRR  Pulmonary:  On room air, speaks in complete sentences, no cyanosis, no accessory muscle use.  Very faint expiratory wheezing   Neuro: awake, alert, ox3, moving arms legs, follow commands   No LE edema noted     Labs:    Recent Results (from the past 24 hour(s))   EKG, 12 LEAD, INITIAL    Collection Time: 11/12/20  8:34 AM   Result Value Ref Range    Ventricular Rate 78 BPM    Atrial Rate 78 BPM    P-R Interval 132 ms    QRS Duration 88 ms    Q-T Interval 426 ms    QTC Calculation (Bezet) 485 ms    Calculated P Axis 72 degrees    Calculated R Axis 46 degrees    Calculated T Axis 23 degrees    Diagnosis       Normal sinus rhythm  Minimal voltage criteria for LVH, may be normal variant  T wave abnormality, consider inferior ischemia  Prolonged QT  Abnormal ECG  When compared with ECG of 10-NOV-2020 19:19,  No significant change was found  Confirmed by Felicita Burks MD, Raji ([id number]) on 11/12/2020 11:45:36 AM     DRUG SCREEN, URINE    Collection Time: 11/12/20 11:48 PM   Result Value Ref Range    BENZODIAZEPINES Positive (A) NEG      BARBITURATES Negative NEG      THC (TH-CANNABINOL) Negative NEG      OPIATES Positive (A) NEG      PCP(PHENCYCLIDINE) Negative NEG      COCAINE Positive (A) NEG      AMPHETAMINES Negative NEG      METHADONE Negative NEG      HDSCOM (NOTE)    URINALYSIS W/ RFLX MICROSCOPIC    Collection Time: 11/12/20 11:48 PM   Result Value Ref Range    Color YELLOW      Appearance CLEAR      Specific gravity 1.016 1.005 - 1.030      pH (UA) 5.0 5.0 - 8.0      Protein Negative NEG mg/dL    Glucose Negative NEG mg/dL    Ketone Negative NEG mg/dL    Bilirubin Negative NEG      Blood Negative NEG      Urobilinogen 0.2 0.2 - 1.0 EU/dL    Nitrites Negative NEG      Leukocyte Esterase Negative NEG         Signed By: DARCIE Cobian     November 13, 2020

## 2020-11-13 NOTE — ED NOTES
Patient In bed resting quietly showing no signs of distress. Patient has no complaints at this time. Patient is requesting another blanket and a cup of coffee.

## 2020-11-13 NOTE — PROGRESS NOTES
TRANSFER - IN REPORT:    Verbal report received from Megan Montes (name) on Reynold Marie  being received from ED (unit) for routine progression of care      Report consisted of patients Situation, Background, Assessment and   Recommendations(SBAR). Information from the following report(s) SBAR, Kardex and MAR was reviewed with the receiving nurse. Opportunity for questions and clarification was provided. Assessment will be completed upon patients arrival to unit and care assumed.

## 2020-11-13 NOTE — DISCHARGE SUMMARY
Physician Discharge Summary       Patient: Dot Garcia MRN: [id number]  SSN: [id number]    YOB: 1965  Age: 63 y.o. Sex: female    PCP: Stacy Hough NP    Allergies: Patient has no known allergies. Admit date: 11/11/2020  Admitting Provider: Philippa Holstein, MD    Discharge date: 11/13/2020  Discharging Provider: DARCIE Perkins    * Admission Diagnoses: Asthma exacerbation [J45.901]  COPD (chronic obstructive pulmonary disease) ([facility] [J44.9]    * Discharge Diagnoses: This patient left AMA on the morning of 11/13/2020  1. COPD with acute exacerbation   2. hypokalemia   3. DEMARCUS - Cr from 0.80 to 1.77 in less than 24 hours   4. Chronic respiratory failure on home O2 4LNC  5. Chronic diastolic CHF- compensated. Normal probnp  6. HTN   7. Repaired incarcerated hernia   8. Tobacco abuse - cessation a few months ago   9. Cocaine, benzos, opiates in UDS,  heroine abuse in past   10. Hx of MI   11. Hx of noncompliance and leaving AMA from the hospital   12. Hx of hepatitis C     HPI per admitting MD: 63 y.o. female who is being admitted for COPD/asthma exacerbation. She gives medical history significant for COPD/asthma, CHF, and history of MI. She presented to the ED with complaint of having shortness of breath and wheezing, with no chest pain, cough or fever. Symptoms were present for 4 days. She received several nebs in the ED however continue to report shortness of breath and was wheezing. She also received IV Solu-Medrol. Observation admission was requested. She says she stopped smoking months ago. She has no other concerns    [facility] Course: Ms. Kenyetta Aviles is a 64 yo female with a history of COPD, HTN, tobacco abuse, drug abuse, noncompliance and leaving AMA who presented with SOB and wheezing. Her troponin was negative, her probnp was normal at 658. She was admitted for a COPD exacerbation.  She was started on IV steroids in addition to duonebs, pulmicort, singulair, daliresp. She was stable on room air throughout hospitalization and never required supplemental oxygen. While in the ED, she was noncooperative and verbally abusive to the ED staff. She wanted narcotics for back pain, instead we gave her a lidocaine patch. Her UDS showed cocaine, benzos and opiates (to note, she did get percocet on admission in ED). On hospital day #2, she was seen early in the morning. She was on room air, no resp distress with very faint expiratory wheezing, then plan was to wean IV steroids. To note, she had DEMARCUS and her Cr was never re-checked because:  Later in the morning, per RN note, the patient left against medical advice. In fact, she has left AMA from [facility] in the past hospitalizations as well.      * Procedures: none  * No surgery found *      Consults: none     Significant Diagnostic Studies:  Recent Results (from the past 24 hour(s))   DRUG SCREEN, URINE    Collection Time: 11/12/20 11:48 PM   Result Value Ref Range    BENZODIAZEPINES Positive (A) NEG      BARBITURATES Negative NEG      THC (TH-CANNABINOL) Negative NEG      OPIATES Positive (A) NEG      PCP(PHENCYCLIDINE) Negative NEG      COCAINE Positive (A) NEG      AMPHETAMINES Negative NEG      METHADONE Negative NEG      HDSCOM (NOTE)    URINALYSIS W/ RFLX MICROSCOPIC    Collection Time: 11/12/20 11:48 PM   Result Value Ref Range    Color YELLOW      Appearance CLEAR      Specific gravity 1.016 1.005 - 1.030      pH (UA) 5.0 5.0 - 8.0      Protein Negative NEG mg/dL    Glucose Negative NEG mg/dL    Ketone Negative NEG mg/dL    Bilirubin Negative NEG      Blood Negative NEG      Urobilinogen 0.2 0.2 - 1.0 EU/dL    Nitrites Negative NEG      Leukocyte Esterase Negative NEG       XR Results (most recent):  Results from Hospital Encounter encounter on 11/11/20   XR CHEST PORT    Narrative EXAM: Chest Radiograph    INDICATION:  SOB    TECHNIQUE: AP view of the chest    COMPARISON: 11/10/2020, 10/19/2020 and 10/11/2020    FINDINGS: No pneumothorax identified. The lungs are clear. No infiltrates  appreciated. No effusions identified. The cardiomediastinal silhouette is  unremarkable. The pulmonary vasculature is unremarkable. The osseous  structures are unremarkable. Impression Impression:  1. No acute cardiopulmonary process. Discharge Exam:  General: WD, WN. Alert, cooperative, no acute distress    HEENT: NC, Atraumatic. PERRLA, EOMI. Anicteric sclerae. Lungs:  CTA Bilaterally. No Wheezing/Rhonchi/Rales. Heart:  Regular  rhythm,  No murmur, No Rubs, No Gallops  Abdomen: Soft, Non distended, Non tender. +Bowel sounds, no HSM  Extremities: No c/c/e  Psych:   Good insight. Not anxious or agitated. Neurologic:  CN 2-12 grossly intact, oriented X 3. No acute neurological                                 Deficits    * Discharge Condition: improved  * Disposition: Home    Discharge Medications: The patient left AMA without my knowledge, thus I was unable to prescribe her any oral prednisone. * Follow-up Care/Patient Instructions:   Activity: Activity as tolerated  Diet: Cardiac Diet  Wound Care: None needed    Follow-up Information     Follow up With Specialties Details Why [address], [address], NP Nurse Practitioner   [address] [facility] [address]  [phone]            Signed:  James Crawford  11/13/2020  11:05 AM

## 2020-11-14 LAB
BACTERIA SPEC CULT: NORMAL
CC UR VC: NORMAL
SERVICE CMNT-IMP: NORMAL

## 2020-11-16 ENCOUNTER — APPOINTMENT (OUTPATIENT)
Dept: GENERAL RADIOLOGY | Age: 55
End: 2020-11-16
Attending: EMERGENCY MEDICINE
Payer: MEDICAID

## 2020-11-16 ENCOUNTER — HOSPITAL ENCOUNTER (EMERGENCY)
Age: 55
Discharge: HOME | End: 2020-11-17
Attending: EMERGENCY MEDICINE | Admitting: EMERGENCY MEDICINE
Payer: MEDICAID

## 2020-11-16 ENCOUNTER — HOSPITAL ENCOUNTER (EMERGENCY)
Age: 55
Discharge: HOME | End: 2020-11-16
Attending: EMERGENCY MEDICINE
Payer: MEDICAID

## 2020-11-16 VITALS
HEIGHT: 59 IN | RESPIRATION RATE: 19 BRPM | WEIGHT: 161 LBS | OXYGEN SATURATION: 92 % | HEART RATE: 87 BPM | DIASTOLIC BLOOD PRESSURE: 53 MMHG | BODY MASS INDEX: 32.46 KG/M2 | TEMPERATURE: 98.1 F | SYSTOLIC BLOOD PRESSURE: 142 MMHG

## 2020-11-16 DIAGNOSIS — J20.9 ACUTE BRONCHITIS, UNSPECIFIED ORGANISM: Primary | ICD-10-CM

## 2020-11-16 DIAGNOSIS — J44.1 ACUTE EXACERBATION OF CHRONIC OBSTRUCTIVE PULMONARY DISEASE (COPD) (HCC): ICD-10-CM

## 2020-11-16 DIAGNOSIS — J45.901 MODERATE ASTHMA WITH ACUTE EXACERBATION, UNSPECIFIED WHETHER PERSISTENT: ICD-10-CM

## 2020-11-16 DIAGNOSIS — R07.89 ATYPICAL CHEST PAIN: ICD-10-CM

## 2020-11-16 LAB
ALBUMIN SERPL-MCNC: 3.1 G/DL (ref 3.4–5)
ALBUMIN/GLOB SERPL: 1 {RATIO} (ref 0.8–1.7)
ALP SERPL-CCNC: 80 U/L (ref 45–117)
ALT SERPL-CCNC: 23 U/L (ref 13–56)
ANION GAP SERPL CALC-SCNC: 3 MMOL/L (ref 3–18)
AST SERPL-CCNC: 24 U/L (ref 10–38)
ATRIAL RATE: 73 BPM
ATRIAL RATE: 74 BPM
BASOPHILS # BLD: 0 K/UL (ref 0–0.1)
BASOPHILS NFR BLD: 0 % (ref 0–2)
BILIRUB SERPL-MCNC: 0.5 MG/DL (ref 0.2–1)
BUN SERPL-MCNC: 15 MG/DL (ref 7–18)
BUN/CREAT SERPL: 14 (ref 12–20)
CALCIUM SERPL-MCNC: 8.4 MG/DL (ref 8.5–10.1)
CALCULATED P AXIS, ECG09: 66 DEGREES
CALCULATED P AXIS, ECG09: 68 DEGREES
CALCULATED R AXIS, ECG10: 60 DEGREES
CALCULATED R AXIS, ECG10: 64 DEGREES
CALCULATED T AXIS, ECG11: -62 DEGREES
CALCULATED T AXIS, ECG11: -64 DEGREES
CHLORIDE SERPL-SCNC: 107 MMOL/L (ref 100–111)
CK MB CFR SERPL CALC: 2.5 % (ref 0–4)
CK MB CFR SERPL CALC: 4.3 % (ref 0–4)
CK MB SERPL-MCNC: 1.7 NG/ML (ref 5–25)
CK MB SERPL-MCNC: 1.7 NG/ML (ref 5–25)
CK SERPL-CCNC: 40 U/L (ref 26–192)
CK SERPL-CCNC: 68 U/L (ref 26–192)
CO2 SERPL-SCNC: 31 MMOL/L (ref 21–32)
CREAT SERPL-MCNC: 1.08 MG/DL (ref 0.6–1.3)
DIAGNOSIS, 93000: NORMAL
DIAGNOSIS, 93000: NORMAL
DIFFERENTIAL METHOD BLD: ABNORMAL
EOSINOPHIL # BLD: 0.1 K/UL (ref 0–0.4)
EOSINOPHIL NFR BLD: 2 % (ref 0–5)
ERYTHROCYTE [DISTWIDTH] IN BLOOD BY AUTOMATED COUNT: 12.5 % (ref 11.6–14.5)
GLOBULIN SER CALC-MCNC: 3.2 G/DL (ref 2–4)
GLUCOSE SERPL-MCNC: 114 MG/DL (ref 74–99)
HCT VFR BLD AUTO: 40.6 % (ref 35–45)
HGB BLD-MCNC: 13.5 G/DL (ref 12–16)
LACTATE BLD-SCNC: 1.32 MMOL/L (ref 0.4–2)
LYMPHOCYTES # BLD: 1.8 K/UL (ref 0.9–3.6)
LYMPHOCYTES NFR BLD: 20 % (ref 21–52)
MCH RBC QN AUTO: 29.7 PG (ref 24–34)
MCHC RBC AUTO-ENTMCNC: 33.3 G/DL (ref 31–37)
MCV RBC AUTO: 89.4 FL (ref 74–97)
MONOCYTES # BLD: 0.6 K/UL (ref 0.05–1.2)
MONOCYTES NFR BLD: 7 % (ref 3–10)
NEUTS SEG # BLD: 6.3 K/UL (ref 1.8–8)
NEUTS SEG NFR BLD: 71 % (ref 40–73)
P-R INTERVAL, ECG05: 132 MS
P-R INTERVAL, ECG05: 134 MS
PLATELET # BLD AUTO: 240 K/UL (ref 135–420)
PMV BLD AUTO: 10.6 FL (ref 9.2–11.8)
POTASSIUM SERPL-SCNC: 4.1 MMOL/L (ref 3.5–5.5)
PROT SERPL-MCNC: 6.3 G/DL (ref 6.4–8.2)
Q-T INTERVAL, ECG07: 394 MS
Q-T INTERVAL, ECG07: 400 MS
QRS DURATION, ECG06: 84 MS
QRS DURATION, ECG06: 88 MS
QTC CALCULATION (BEZET), ECG08: 437 MS
QTC CALCULATION (BEZET), ECG08: 440 MS
RBC # BLD AUTO: 4.54 M/UL (ref 4.2–5.3)
SODIUM SERPL-SCNC: 141 MMOL/L (ref 136–145)
TROPONIN I SERPL-MCNC: 0.02 NG/ML (ref 0–0.04)
TROPONIN I SERPL-MCNC: <0.02 NG/ML (ref 0–0.04)
VENTRICULAR RATE, ECG03: 73 BPM
VENTRICULAR RATE, ECG03: 74 BPM
WBC # BLD AUTO: 8.8 K/UL (ref 4.6–13.2)

## 2020-11-16 PROCEDURE — 74011250637 HC RX REV CODE- 250/637: Performed by: EMERGENCY MEDICINE

## 2020-11-16 PROCEDURE — 71045 X-RAY EXAM CHEST 1 VIEW: CPT

## 2020-11-16 PROCEDURE — 93005 ELECTROCARDIOGRAM TRACING: CPT

## 2020-11-16 PROCEDURE — 74011000250 HC RX REV CODE- 250: Performed by: EMERGENCY MEDICINE

## 2020-11-16 PROCEDURE — 82550 ASSAY OF CK (CPK): CPT

## 2020-11-16 PROCEDURE — 85025 COMPLETE CBC W/AUTO DIFF WBC: CPT

## 2020-11-16 PROCEDURE — 83605 ASSAY OF LACTIC ACID: CPT

## 2020-11-16 PROCEDURE — 96365 THER/PROPH/DIAG IV INF INIT: CPT

## 2020-11-16 PROCEDURE — 94640 AIRWAY INHALATION TREATMENT: CPT

## 2020-11-16 PROCEDURE — 87040 BLOOD CULTURE FOR BACTERIA: CPT

## 2020-11-16 PROCEDURE — 74011250636 HC RX REV CODE- 250/636: Performed by: EMERGENCY MEDICINE

## 2020-11-16 PROCEDURE — 99285 EMERGENCY DEPT VISIT HI MDM: CPT

## 2020-11-16 PROCEDURE — 80053 COMPREHEN METABOLIC PANEL: CPT

## 2020-11-16 PROCEDURE — 87635 SARS-COV-2 COVID-19 AMP PRB: CPT

## 2020-11-16 PROCEDURE — 96375 TX/PRO/DX INJ NEW DRUG ADDON: CPT

## 2020-11-16 RX ORDER — OXYCODONE AND ACETAMINOPHEN 5; 325 MG/1; MG/1
1 TABLET ORAL ONCE
Status: COMPLETED | OUTPATIENT
Start: 2020-11-16 | End: 2020-11-16

## 2020-11-16 RX ORDER — MAGNESIUM SULFATE HEPTAHYDRATE 40 MG/ML
2 INJECTION, SOLUTION INTRAVENOUS
Status: COMPLETED | OUTPATIENT
Start: 2020-11-16 | End: 2020-11-17

## 2020-11-16 RX ORDER — OXYCODONE AND ACETAMINOPHEN 5; 325 MG/1; MG/1
1 TABLET ORAL
Status: COMPLETED | OUTPATIENT
Start: 2020-11-16 | End: 2020-11-16

## 2020-11-16 RX ORDER — KETOROLAC TROMETHAMINE 15 MG/ML
15 INJECTION, SOLUTION INTRAMUSCULAR; INTRAVENOUS ONCE
Status: DISCONTINUED | OUTPATIENT
Start: 2020-11-16 | End: 2020-11-16 | Stop reason: HOSPADM

## 2020-11-16 RX ORDER — IPRATROPIUM BROMIDE AND ALBUTEROL SULFATE 2.5; .5 MG/3ML; MG/3ML
3 SOLUTION RESPIRATORY (INHALATION)
Status: DISPENSED | OUTPATIENT
Start: 2020-11-16 | End: 2020-11-16

## 2020-11-16 RX ORDER — MAGNESIUM SULFATE HEPTAHYDRATE 40 MG/ML
2 INJECTION, SOLUTION INTRAVENOUS
Status: COMPLETED | OUTPATIENT
Start: 2020-11-16 | End: 2020-11-16

## 2020-11-16 RX ORDER — ALBUTEROL SULFATE 0.83 MG/ML
2.5 SOLUTION RESPIRATORY (INHALATION)
Qty: 30 NEBULE | Refills: 0 | Status: ON HOLD | OUTPATIENT
Start: 2020-11-16 | End: 2020-11-30 | Stop reason: SDUPTHER

## 2020-11-16 RX ORDER — PREDNISONE 20 MG/1
60 TABLET ORAL DAILY
Qty: 15 TAB | Refills: 0 | Status: SHIPPED | OUTPATIENT
Start: 2020-11-16 | End: 2020-11-21

## 2020-11-16 RX ORDER — DOXYCYCLINE 100 MG/1
100 CAPSULE ORAL 2 TIMES DAILY
Qty: 14 CAP | Refills: 0 | Status: SHIPPED | OUTPATIENT
Start: 2020-11-16 | End: 2020-11-23

## 2020-11-16 RX ADMIN — METHYLPREDNISOLONE SODIUM SUCCINATE 125 MG: 125 INJECTION, POWDER, FOR SOLUTION INTRAMUSCULAR; INTRAVENOUS at 06:21

## 2020-11-16 RX ADMIN — IPRATROPIUM BROMIDE AND ALBUTEROL SULFATE 3 ML: .5; 3 SOLUTION RESPIRATORY (INHALATION) at 06:20

## 2020-11-16 RX ADMIN — SODIUM CHLORIDE 500 ML: 900 INJECTION, SOLUTION INTRAVENOUS at 06:21

## 2020-11-16 RX ADMIN — OXYCODONE HYDROCHLORIDE AND ACETAMINOPHEN 1 TABLET: 5; 325 TABLET ORAL at 09:02

## 2020-11-16 RX ADMIN — OXYCODONE HYDROCHLORIDE AND ACETAMINOPHEN 1 TABLET: 5; 325 TABLET ORAL at 22:54

## 2020-11-16 RX ADMIN — OXYCODONE HYDROCHLORIDE AND ACETAMINOPHEN 1 TABLET: 5; 325 TABLET ORAL at 06:21

## 2020-11-16 RX ADMIN — MAGNESIUM SULFATE HEPTAHYDRATE 2 G: 40 INJECTION, SOLUTION INTRAVENOUS at 06:20

## 2020-11-16 RX ADMIN — IPRATROPIUM BROMIDE AND ALBUTEROL SULFATE 3 ML: .5; 3 SOLUTION RESPIRATORY (INHALATION) at 22:53

## 2020-11-16 RX ADMIN — IPRATROPIUM BROMIDE AND ALBUTEROL SULFATE 3 ML: .5; 3 SOLUTION RESPIRATORY (INHALATION) at 07:06

## 2020-11-16 RX ADMIN — IPRATROPIUM BROMIDE AND ALBUTEROL SULFATE 3 ML: .5; 3 SOLUTION RESPIRATORY (INHALATION) at 06:47

## 2020-11-16 NOTE — ED NOTES
07:00  Assumed care by Dr. Jeanette Garcia pending reassessment  Patient no leukocytosis.   Repeat troponin negative  Xray no pneumonia  Pain medication given for her back pain   Patient not hypoxic or tachypneic  Patient will be discharged with steroid and albuterol prescription

## 2020-11-16 NOTE — ED PROVIDER NOTES
Jacques Nassar is a 63 y.o. female smoker who is very well-known to the emergency department here with past medical history of hypertension, CHF, severe COPD, CAD, and polysubstance abuse coming in complaining of shortness of breath. Patient states for last 2 weeks she has \"caught a cold. \"  She states she has had a cough productive of clear and yellow sputum. She states she has been more short of breath than normal.  She also reports some diffuse chest pain which is been constant for weeks. She states the pain is somewhat worse when she coughs. She also reports bilateral low back pain which is achy and chronic. She denies any lower extremity weakness, numbness, urinary bowel incontinence or retention, or saddle anesthesia. She denies any falls or trauma to the back. She denies any hemoptysis or leg swelling. Denies any history of DVT/PE. Denies any recent drug use. Denies any fevers or chills. Patient states she was tested for Covid-19 about 2 weeks ago and this was negative. Patient denies any known sick contacts or exposure anyone with Covid-19. Patient has no other complaints at this time.            Past Medical History:   Diagnosis Date    CHF (congestive heart failure) (HCC)     Chronic respiratory failure with hypoxia ([facility] [address] 9/7/2020    CKD (chronic kidney disease) stage 3, GFR 30-59 ml/min 10/31/2019    Cocaine abuse ([address] 11/26/2017    COPD (chronic obstructive pulmonary disease) with chronic bronchitis ([address] 12/19/2017    Dependence on supplemental oxygen     Depression 3/22/2020    Drug-seeking behavior 11/19/2016    Endocrine disease     thyroid issues    Essential hypertension 3/10/2017    Fe deficiency anemia 10/27/2012    Fibromyalgia 12/16/2016    Gastroesophageal reflux disease without esophagitis 10/10/2016    Gastrointestinal disorder     \"blockage in my stomach\"    Hypercholesterolemia     Hypertension     Hypertensive heart failure ([address] 12/19/2017    Morbid obesity due to excess calories ([facility] [address] 3/10/2017    NSTEMI (non-ST elevated myocardial infarction) ([facility] [address] 3/22/2020    On home O2 12/3/2015    Overview:  2L at home per pt for approx 8 years    Polysubstance abuse ([facility] [address] 9/15/2018    Respiratory failure ([facility] [address] 1/11/2017    S/P hernia repair 10/31/2019    Sleep apnea 12/19/2017    T wave inversion in EKG 3/9/2019    Tobacco use 4/38/0339    Uncomplicated severe persistent asthma 12/13/2016    Vocal cord disease 12/7/2016       Past Surgical History:   Procedure Laterality Date    HX GI      Exploratory laparotomy with lysis of adhesions and primary repair of incarcerated umbilical hernia         No family history on file.     Social History     Socioeconomic History    Marital status: SINGLE     Spouse name: Not on file    Number of children: Not on file    Years of education: Not on file    Highest education level: Not on file   Occupational History    Not on file   Social Needs    Financial resource strain: Not on file    Food insecurity     Worry: Not on file     Inability: Not on file    Transportation needs     Medical: Not on file     Non-medical: Not on file   Tobacco Use    Smoking status: Current Every Day Smoker     Packs/day: 0.25    Smokeless tobacco: Current User   Substance and Sexual Activity    Alcohol use: No     Comment: socially    Drug use: Not Currently     Types: Heroin    Sexual activity: Not Currently     Comment: last used 9 years ago   Lifestyle    Physical activity     Days per week: Not on file     Minutes per session: Not on file    Stress: Not on file   Relationships    Social connections     Talks on phone: Not on file     Gets together: Not on file     Attends Shinto service: Not on file     Active member of club or organization: Not on file     Attends meetings of clubs or organizations: Not on file     Relationship status: Not on file    Intimate partner violence     Fear of current or ex partner: Not on file Emotionally abused: Not on file     Physically abused: Not on file     Forced sexual activity: Not on file   Other Topics Concern    Not on file   Social History Narrative    Not on file         ALLERGIES: Patient has no known allergies. Review of Systems   Constitutional: Positive for fatigue. Negative for chills and fever. Respiratory: Positive for cough, shortness of breath and wheezing. Cardiovascular: Positive for chest pain. Negative for leg swelling. Gastrointestinal: Negative. Negative for abdominal pain, nausea and vomiting. Musculoskeletal: Positive for back pain. Negative for myalgias. Skin: Negative. Negative for rash. Neurological: Negative. Negative for dizziness, weakness and light-headedness. All other systems reviewed and are negative. Vitals:    11/16/20 0600 11/16/20 0615 11/16/20 0630 11/16/20 0645   BP: (!) 153/67 (!) 168/69 (!) 179/68 (!) 152/63   Pulse: 74 81 74 83   Resp: 21 21 18 20   Temp:       SpO2: 96% 93% 95% 100%   Weight:       Height:                Physical Exam  Vitals signs reviewed. Constitutional:       General: She is not in acute distress. Appearance: Normal appearance. She is well-developed. HENT:      Head: Normocephalic and atraumatic. Eyes:      Extraocular Movements: Extraocular movements intact. Conjunctiva/sclera: Conjunctivae normal.      Pupils: Pupils are equal, round, and reactive to light. Neck:      Musculoskeletal: Normal range of motion and neck supple. Cardiovascular:      Rate and Rhythm: Normal rate and regular rhythm. Heart sounds: S1 normal and S2 normal. No murmur. No friction rub. No gallop. Pulmonary:      Effort: Tachypnea present. No respiratory distress. Comments: Pronounced expiratory wheezing in all lung fields with a prolonged expiratory phase and mild accessory abdominal muscle use with respirations. Patient is speaking in full sentences without any retractions.   Does not appear to be in severe distress. Abdominal:      General: There is no distension. Tenderness: There is no abdominal tenderness. Musculoskeletal: Normal range of motion. General: No tenderness. Comments: No midline cervical, thoracic, or lumbar tenderness to palpation. No point tenderness step-off or deformity. Skin:     General: Skin is warm. Findings: No rash. Neurological:      General: No focal deficit present. Mental Status: She is alert and oriented to person, place, and time. Psychiatric:         Speech: Speech normal.          MDM  Number of Diagnoses or Management Options  Acute bronchitis, unspecified organism:   Diagnosis management comments: June Jimenez is a 63 y.o. female with a history of COPD coming in complaining of productive cough, and, and shortness of breath. Also has chest pain, but does have a history of chronic chest pain back pain. Patient has good O2 sats and normal respiratory rate. I have low suspicion of pulmonary embolism. Symptoms sound infectious in nature. She is afebrile and well-appearing without evidence of sepsis. I suspect the differential includes viral pneumonia versus pneumonitis, bacterial pneumonia, acute bronchitis, Covid-19.          Procedures      Vitals:  Patient Vitals for the past 12 hrs:   Temp Pulse Resp BP SpO2   11/16/20 0645  83 20 (!) 152/63 100 %   11/16/20 0630  74 18 (!) 179/68 95 %   11/16/20 0615  81 21 (!) 168/69 93 %   11/16/20 0600  74 21 (!) 153/67 96 %   11/16/20 0545  74 20 (!) 152/40 95 %   11/16/20 0530  81 24 (!) 163/62 97 %   11/16/20 0515  76 19 (!) 142/62 96 %   11/16/20 0500  80 19 (!) 153/89 97 %   11/16/20 0458     94 %   11/16/20 0445  74 16  99 %   11/16/20 0444  79 19  98 %   11/16/20 0443  77 22  98 %   11/16/20 0442 98.1 °F (36.7 °C) 81 24 131/74 97 %       Medications ordered:   Medications   albuterol-ipratropium (DUO-NEB) 2.5 MG-0.5 MG/3 ML (3 mL Nebulization Given 11/16/20 0706) methylPREDNISolone (PF) (Solu-MEDROL) injection 125 mg (125 mg IntraVENous Given 11/16/20 0621)   sodium chloride 0.9 % bolus infusion 500 mL (500 mL IntraVENous New Bag 11/16/20 0621)   oxyCODONE-acetaminophen (PERCOCET) 5-325 mg per tablet 1 Tab (1 Tab Oral Given 11/16/20 9801)   magnesium sulfate 2 g/50 ml IVPB (premix or compounded) (2 g IntraVENous New Bag 11/16/20 0620)         Lab findings:  Recent Results (from the past 12 hour(s))   EKG, 12 LEAD, INITIAL    Collection Time: 11/16/20  4:46 AM   Result Value Ref Range    Ventricular Rate 74 BPM    Atrial Rate 74 BPM    P-R Interval 132 ms    QRS Duration 88 ms    Q-T Interval 394 ms    QTC Calculation (Bezet) 437 ms    Calculated P Axis 68 degrees    Calculated R Axis 64 degrees    Calculated T Axis -62 degrees    Diagnosis       Sinus rhythm with occasional premature ventricular complexes  Minimal voltage criteria for LVH, may be normal variant  Possible Anterior infarct , age undetermined  T wave abnormality, consider inferior ischemia  Abnormal ECG  When compared with ECG of 12-NOV-2020 08:34,  premature ventricular complexes are now present  Borderline criteria for Anterior infarct are now present     EKG, 12 LEAD, SUBSEQUENT    Collection Time: 11/16/20  5:42 AM   Result Value Ref Range    Ventricular Rate 73 BPM    Atrial Rate 73 BPM    P-R Interval 134 ms    QRS Duration 84 ms    Q-T Interval 400 ms    QTC Calculation (Bezet) 440 ms    Calculated P Axis 66 degrees    Calculated R Axis 60 degrees    Calculated T Axis -64 degrees    Diagnosis       Normal sinus rhythm  Septal infarct (cited on or before 16-NOV-2020)  T wave abnormality, consider lateral ischemia  Abnormal ECG  When compared with ECG of 16-NOV-2020 04:46,  premature ventricular complexes are no longer present     CBC WITH AUTOMATED DIFF    Collection Time: 11/16/20  5:53 AM   Result Value Ref Range    WBC 8.8 4.6 - 13.2 K/uL    RBC 4.54 4.20 - 5.30 M/uL    HGB 13.5 12.0 - 16.0 g/dL HCT 40.6 35.0 - 45.0 %    MCV 89.4 74.0 - 97.0 FL    MCH 29.7 24.0 - 34.0 PG    MCHC 33.3 31.0 - 37.0 g/dL    RDW 12.5 11.6 - 14.5 %    PLATELET 186 756 - 012 K/uL    MPV 10.6 9.2 - 11.8 FL    NEUTROPHILS 71 40 - 73 %    LYMPHOCYTES 20 (L) 21 - 52 %    MONOCYTES 7 3 - 10 %    EOSINOPHILS 2 0 - 5 %    BASOPHILS 0 0 - 2 %    ABS. NEUTROPHILS 6.3 1.8 - 8.0 K/UL    ABS. LYMPHOCYTES 1.8 0.9 - 3.6 K/UL    ABS. MONOCYTES 0.6 0.05 - 1.2 K/UL    ABS. EOSINOPHILS 0.1 0.0 - 0.4 K/UL    ABS. BASOPHILS 0.0 0.0 - 0.1 K/UL    DF AUTOMATED     CARDIAC PANEL,(CK, CKMB & TROPONIN)    Collection Time: 11/16/20  5:53 AM   Result Value Ref Range    CK - MB 1.7 <3.6 ng/ml    CK-MB Index 2.5 0.0 - 4.0 %    CK 68 26 - 192 U/L    Troponin-I, QT <0.02 0.0 - 6.034 NG/ML   METABOLIC PANEL, COMPREHENSIVE    Collection Time: 11/16/20  5:53 AM   Result Value Ref Range    Sodium 141 136 - 145 mmol/L    Potassium 4.1 3.5 - 5.5 mmol/L    Chloride 107 100 - 111 mmol/L    CO2 31 21 - 32 mmol/L    Anion gap 3 3.0 - 18 mmol/L    Glucose 114 (H) 74 - 99 mg/dL    BUN 15 7.0 - 18 MG/DL    Creatinine 1.08 0.6 - 1.3 MG/DL    BUN/Creatinine ratio 14 12 - 20      GFR est AA >60 >60 ml/min/1.73m2    GFR est non-AA 52 (L) >60 ml/min/1.73m2    Calcium 8.4 (L) 8.5 - 10.1 MG/DL    Bilirubin, total 0.5 0.2 - 1.0 MG/DL    ALT (SGPT) 23 13 - 56 U/L    AST (SGOT) 24 10 - 38 U/L    Alk. phosphatase 80 45 - 117 U/L    Protein, total 6.3 (L) 6.4 - 8.2 g/dL    Albumin 3.1 (L) 3.4 - 5.0 g/dL    Globulin 3.2 2.0 - 4.0 g/dL    A-G Ratio 1.0 0.8 - 1.7     POC LACTIC ACID    Collection Time: 11/16/20  5:58 AM   Result Value Ref Range    Lactic Acid (POC) 1.32 0.40 - 2.00 mmol/L       EKG interpretation by ED Physician: Sinus rhythm rate of 73 bpm.  Nonspecific T wave changes inferiorly and laterally consistent with prior EKGs.     X-Ray, CT or other radiology findings or impressions:  XR CHEST PORT    (Results Pending)       Progress notes, Consult notes or additional Procedure notes:     Patient care transferred to incoming physician Dr. Tanisha Villagomez, pending 2 more DuoNeb breathing treatments, second set of cardiac enzymes, and reevaluation. Disposition:  Diagnosis:   1. Acute bronchitis, unspecified organism        Disposition: Pending    Follow-up Information    None          Patient's Medications   Start Taking    No medications on file   Continue Taking    ALBUTEROL (PROVENTIL HFA, VENTOLIN HFA, PROAIR HFA) 90 MCG/ACTUATION INHALER    Take 2 Puffs by inhalation every four (4) hours as needed for Wheezing. ALBUTEROL SULFATE 90 MCG/ACTUATION AEBS    Take 2 Puffs by inhalation every four to six (4-6) hours as needed for Wheezing for up to 30 days. ALBUTEROL-IPRATROPIUM (DUO-NEB) 2.5 MG-0.5 MG/3 ML NEBU    3 mL by Nebulization route every four (4) hours as needed for Wheezing, Shortness of Breath or Cough. ALPRAZOLAM (XANAX) 0.5 MG TABLET    Take 1 Tab by mouth every eight (8) hours as needed for Anxiety. Max Daily Amount: 1.5 mg. ARFORMOTEROL (BROVANA) 15 MCG/2 ML NEBU NEB SOLUTION    2 mL by Nebulization route two (2) times a day. Indications: bronchospasm prevention with COPD    ASPIRIN DELAYED-RELEASE 81 MG TABLET    Take 1 Tab by mouth daily. ATORVASTATIN (LIPITOR) 20 MG TABLET    Take 1 Tab by mouth nightly. BUDESONIDE (PULMICORT) 0.5 MG/2 ML NBSP    2 mL by Nebulization route two (2) times a day. CITALOPRAM (CELEXA) 20 MG TABLET    1 tablet    CLONIDINE HCL (CATAPRES) 0.2 MG TABLET    Take 1 Tab by mouth two (2) times a day for 30 days. DOCUSATE SODIUM (COLACE) 100 MG CAPSULE    Take 1 Cap by mouth two (2) times a day. DOXYCYCLINE (MONODOX) 100 MG CAPSULE    Take 1 Cap by mouth two (2) times a day for 7 days. FAMOTIDINE (PEPCID) 20 MG TABLET    Take 1 Tab by mouth daily. FLUTICASONE FUROATE-VILANTEROL (BREO ELLIPTA) 200-25 MCG/DOSE INHALER    Take 1 Puff by inhalation daily.     GEMFIBROZIL (LOPID) 600 MG TABLET    1 tablet    MONTELUKAST (SINGULAIR) 10 MG TABLET    Take 1 Tab by mouth nightly. NALOXONE (NARCAN) 4 MG/ACTUATION NASAL SPRAY    Use 1 spray intranasally, then discard. Repeat with new spray every 2 min as needed for opioid overdose symptoms, alternating nostrils. NITROGLYCERIN (NITROSTAT) 0.4 MG SL TABLET    Take 1 Tab by mouth every five (5) minutes as needed for Chest Pain. Sit/Lay down then put one tab under the tongue every 5 minutes as needed for chest pain for 3 doses    OXYGEN-AIR DELIVERY SYSTEMS    3 L by IntraNASal route as needed for Other (sob). PREDNISONE (DELTASONE) 20 MG TABLET    Take 60 mg by mouth daily for 5 days. With Breakfast    PREDNISONE (STERAPRED DS) 10 MG DOSE PACK    6 day dose pack per package instructions    QUETIAPINE (SEROQUEL) 50 MG TABLET    TAKE 1 TABLET BY MOUTH AT BEDTIME FOR MOOD    ROFLUMILAST (DALIRESP) 500 MCG TAB TABLET    Take 1 Tab by mouth daily. SUCRALFATE (CARAFATE) 100 MG/ML SUSPENSION    Take 1,000 mg by mouth.    These Medications have changed    No medications on file   Stop Taking    No medications on file

## 2020-11-16 NOTE — ED NOTES
Tried to administer Duo-Neb treatment with a nebulizer, but patient stated \"that makes me work too hard. I need a mask. \" Administered albuterol treatment with a mask Aerolizer. Patient is now in bed sleeping showing no signs of distress.

## 2020-11-16 NOTE — ED TRIAGE NOTES
Assumed care of pt   Introduced self to pt  Updated white board and explained usage  Pt alert and oriented x 4   Pt received from EMS well known to ED   C/o SOB and \"feeling like I am dying\"  Pt signed out [place] on Friday from this hospital   Hx of CHF and asthma  Pt placed on the monitor SA with PVCs  - jugular distention  + cough  Pt chest pain   Pt is using accessory muscles to brethe  SpO2 94 on room air  Updated about plan of care   Tech at bedside   Will continue to monitor and assess    This note will not be viewable in MyChart for the following reason(s). Suspected substance abuse disorder.

## 2020-11-16 NOTE — ED NOTES
Pt is verbally aggressive with this RN at bedside while this RN was explaining to pt the ED physician was not going to order more narcotics for her, pt was offered Toradol for pain and pt refused.

## 2020-11-17 ENCOUNTER — PATIENT OUTREACH (OUTPATIENT)
Dept: CASE MANAGEMENT | Age: 55
End: 2020-11-17

## 2020-11-17 VITALS
SYSTOLIC BLOOD PRESSURE: 161 MMHG | DIASTOLIC BLOOD PRESSURE: 58 MMHG | HEART RATE: 86 BPM | OXYGEN SATURATION: 94 % | TEMPERATURE: 98.3 F | RESPIRATION RATE: 24 BRPM

## 2020-11-17 LAB
ANION GAP SERPL CALC-SCNC: 6 MMOL/L (ref 3–18)
ATRIAL RATE: 75 BPM
ATRIAL RATE: 80 BPM
BASOPHILS # BLD: 0 K/UL (ref 0–0.1)
BASOPHILS NFR BLD: 0 % (ref 0–2)
BUN SERPL-MCNC: 11 MG/DL (ref 7–18)
BUN/CREAT SERPL: 13 (ref 12–20)
CALCIUM SERPL-MCNC: 8.8 MG/DL (ref 8.5–10.1)
CALCULATED P AXIS, ECG09: 71 DEGREES
CALCULATED R AXIS, ECG10: 51 DEGREES
CALCULATED R AXIS, ECG10: 59 DEGREES
CALCULATED T AXIS, ECG11: 16 DEGREES
CALCULATED T AXIS, ECG11: 55 DEGREES
CHLORIDE SERPL-SCNC: 106 MMOL/L (ref 100–111)
CK MB CFR SERPL CALC: 3.6 % (ref 0–4)
CK MB SERPL-MCNC: 2.1 NG/ML (ref 5–25)
CK SERPL-CCNC: 58 U/L (ref 26–192)
CO2 SERPL-SCNC: 28 MMOL/L (ref 21–32)
CREAT SERPL-MCNC: 0.87 MG/DL (ref 0.6–1.3)
DIAGNOSIS, 93000: NORMAL
DIAGNOSIS, 93000: NORMAL
DIFFERENTIAL METHOD BLD: ABNORMAL
EOSINOPHIL # BLD: 0 K/UL (ref 0–0.4)
EOSINOPHIL NFR BLD: 0 % (ref 0–5)
ERYTHROCYTE [DISTWIDTH] IN BLOOD BY AUTOMATED COUNT: 12.3 % (ref 11.6–14.5)
GLUCOSE SERPL-MCNC: 145 MG/DL (ref 74–99)
HCT VFR BLD AUTO: 38.2 % (ref 35–45)
HGB BLD-MCNC: 12.9 G/DL (ref 12–16)
LYMPHOCYTES # BLD: 1.3 K/UL (ref 0.9–3.6)
LYMPHOCYTES NFR BLD: 15 % (ref 21–52)
MAGNESIUM SERPL-MCNC: 2.4 MG/DL (ref 1.6–2.6)
MCH RBC QN AUTO: 29.6 PG (ref 24–34)
MCHC RBC AUTO-ENTMCNC: 33.8 G/DL (ref 31–37)
MCV RBC AUTO: 87.6 FL (ref 74–97)
MONOCYTES # BLD: 0.5 K/UL (ref 0.05–1.2)
MONOCYTES NFR BLD: 6 % (ref 3–10)
NEUTS SEG # BLD: 6.9 K/UL (ref 1.8–8)
NEUTS SEG NFR BLD: 79 % (ref 40–73)
P-R INTERVAL, ECG05: 136 MS
PLATELET # BLD AUTO: 274 K/UL (ref 135–420)
PMV BLD AUTO: 10.9 FL (ref 9.2–11.8)
POTASSIUM SERPL-SCNC: 3.8 MMOL/L (ref 3.5–5.5)
Q-T INTERVAL, ECG07: 440 MS
Q-T INTERVAL, ECG07: 468 MS
QRS DURATION, ECG06: 80 MS
QRS DURATION, ECG06: 88 MS
QTC CALCULATION (BEZET), ECG08: 507 MS
QTC CALCULATION (BEZET), ECG08: 522 MS
RBC # BLD AUTO: 4.36 M/UL (ref 4.2–5.3)
SODIUM SERPL-SCNC: 140 MMOL/L (ref 136–145)
TROPONIN I SERPL-MCNC: <0.02 NG/ML (ref 0–0.04)
VENTRICULAR RATE, ECG03: 75 BPM
VENTRICULAR RATE, ECG03: 80 BPM
WBC # BLD AUTO: 8.7 K/UL (ref 4.6–13.2)

## 2020-11-17 PROCEDURE — 74011250636 HC RX REV CODE- 250/636: Performed by: EMERGENCY MEDICINE

## 2020-11-17 PROCEDURE — 83735 ASSAY OF MAGNESIUM: CPT

## 2020-11-17 PROCEDURE — 74011636637 HC RX REV CODE- 636/637: Performed by: EMERGENCY MEDICINE

## 2020-11-17 PROCEDURE — 93005 ELECTROCARDIOGRAM TRACING: CPT

## 2020-11-17 PROCEDURE — 96365 THER/PROPH/DIAG IV INF INIT: CPT

## 2020-11-17 PROCEDURE — 82550 ASSAY OF CK (CPK): CPT

## 2020-11-17 PROCEDURE — 80048 BASIC METABOLIC PNL TOTAL CA: CPT

## 2020-11-17 PROCEDURE — 74011250637 HC RX REV CODE- 250/637: Performed by: EMERGENCY MEDICINE

## 2020-11-17 PROCEDURE — 85025 COMPLETE CBC W/AUTO DIFF WBC: CPT

## 2020-11-17 RX ORDER — ALBUTEROL SULFATE 90 UG/1
2 AEROSOL, METERED RESPIRATORY (INHALATION)
Status: DISCONTINUED | OUTPATIENT
Start: 2020-11-17 | End: 2020-11-17 | Stop reason: HOSPADM

## 2020-11-17 RX ORDER — OXYCODONE AND ACETAMINOPHEN 5; 325 MG/1; MG/1
1 TABLET ORAL ONCE
Status: COMPLETED | OUTPATIENT
Start: 2020-11-17 | End: 2020-11-17

## 2020-11-17 RX ORDER — PREDNISONE 20 MG/1
60 TABLET ORAL
Status: COMPLETED | OUTPATIENT
Start: 2020-11-17 | End: 2020-11-17

## 2020-11-17 RX ORDER — ALBUTEROL SULFATE 0.83 MG/ML
2.5 SOLUTION RESPIRATORY (INHALATION)
Qty: 30 EACH | Refills: 0 | Status: SHIPPED | OUTPATIENT
Start: 2020-11-17 | End: 2020-11-28

## 2020-11-17 RX ORDER — IPRATROPIUM BROMIDE AND ALBUTEROL SULFATE 2.5; .5 MG/3ML; MG/3ML
SOLUTION RESPIRATORY (INHALATION)
Status: DISCONTINUED
Start: 2020-11-17 | End: 2020-11-17 | Stop reason: HOSPADM

## 2020-11-17 RX ORDER — ALBUTEROL SULFATE 90 UG/1
2 AEROSOL, METERED RESPIRATORY (INHALATION)
Qty: 1 INHALER | Refills: 0 | Status: SHIPPED | OUTPATIENT
Start: 2020-11-17 | End: 2020-12-18

## 2020-11-17 RX ORDER — ALBUTEROL SULFATE 90 UG/1
AEROSOL, METERED RESPIRATORY (INHALATION)
Status: DISCONTINUED
Start: 2020-11-17 | End: 2020-11-17 | Stop reason: WASHOUT

## 2020-11-17 RX ORDER — DOXYCYCLINE 100 MG/1
100 CAPSULE ORAL
Status: COMPLETED | OUTPATIENT
Start: 2020-11-17 | End: 2020-11-17

## 2020-11-17 RX ADMIN — MAGNESIUM SULFATE HEPTAHYDRATE 2 G: 40 INJECTION, SOLUTION INTRAVENOUS at 03:00

## 2020-11-17 RX ADMIN — PREDNISONE 60 MG: 20 TABLET ORAL at 05:09

## 2020-11-17 RX ADMIN — DOXYCYCLINE HYCLATE 100 MG: 100 CAPSULE ORAL at 05:09

## 2020-11-17 RX ADMIN — OXYCODONE HYDROCHLORIDE AND ACETAMINOPHEN 1 TABLET: 5; 325 TABLET ORAL at 05:17

## 2020-11-17 NOTE — PROGRESS NOTES
Patient contacted regarding recent discharge and COVID-19 risk. Discussed COVID-19 related testing which was not done at this time. Test results were not done. Patient informed of results, if available? N/A      Number on file for patient unable to receive calls at this time. Left HIPPA complaint message on voicemail of emergency contact. Will attempt to contact at a later time.

## 2020-11-17 NOTE — ED PROVIDER NOTES
Jacques Nassar is a 63 y.o. female smoker who is very well-known to this emergency department with past medical history of hypertension, CHF, severe COPD on home O2, CAD, polysubstance abuse coming in complaint of shortness of breath. I actually evaluated patient this morning for shortness of breath. She had extensive wheezing and was treated for COPD exacerbation. She was signed out to Dr. Clary Loza, felt better and was discharged with a prescription for prednisone and more albuterol for her nebulizer machine. Patient states that she felt much better when she left the hospital this morning and felt okay throughout the day, however her shortness of breath worsened again tonight. She states she usually gets worse at night and cannot sleep. She states that she is still coughing up yellow and clear mucus. She continues to deny any fevers or chills. She continues to endorse substernal pressure-like chest pain which is chronic and intermittent. She also reports chronic lumbar back pain which is achy and unchanged from baseline. She denies any fall or injury. She denies any lower extremity weakness, urinary or bowel incontinence or retention, or saddle anesthesia. Patient used her nebulizer treatments once at home and was given another nebulizer treatment in route by EMS. Patient has no other complaints at this time.            Past Medical History:   Diagnosis Date    CHF (congestive heart failure) (HCC)     Chronic respiratory failure with hypoxia ([address] 9/7/2020    CKD (chronic kidney disease) stage 3, GFR 30-59 ml/min 10/31/2019    Cocaine abuse ([address] 11/26/2017    COPD (chronic obstructive pulmonary disease) with chronic bronchitis ([facility] 12/19/2017    Dependence on supplemental oxygen     Depression 3/22/2020    Drug-seeking behavior 11/19/2016    Endocrine disease     thyroid issues    Essential hypertension 3/10/2017    Fe deficiency anemia 10/27/2012    Fibromyalgia 12/16/2016    Gastroesophageal reflux disease without esophagitis 10/10/2016    Gastrointestinal disorder     \"blockage in my stomach\"    Hypercholesterolemia     Hypertension     Hypertensive heart failure ([facility] 12/19/2017    Morbid obesity due to excess calories ([facility] 3/10/2017    NSTEMI (non-ST elevated myocardial infarction) ([facility] [facility] 3/22/2020    On home O2 12/3/2015    Overview:  2L at home per pt for approx 8 years    Polysubstance abuse ([facility] 9/15/2018    Respiratory failure ([facility] 1/11/2017    S/P hernia repair 10/31/2019    Sleep apnea 12/19/2017    T wave inversion in EKG 3/9/2019    Tobacco use 3/47/0317    Uncomplicated severe persistent asthma 12/13/2016    Vocal cord disease 12/7/2016       Past Surgical History:   Procedure Laterality Date    HX GI      Exploratory laparotomy with lysis of adhesions and primary repair of incarcerated umbilical hernia         No family history on file.     Social History     Socioeconomic History    Marital status: SINGLE     Spouse name: Not on file    Number of children: Not on file    Years of education: Not on file    Highest education level: Not on file   Occupational History    Not on file   Social Needs    Financial resource strain: Not on file    Food insecurity     Worry: Not on file     Inability: Not on file    Transportation needs     Medical: Not on file     Non-medical: Not on file   Tobacco Use    Smoking status: Current Every Day Smoker     Packs/day: 0.25    Smokeless tobacco: Current User   Substance and Sexual Activity    Alcohol use: No     Comment: socially    Drug use: Not Currently     Types: Heroin    Sexual activity: Not Currently     Comment: last used 9 years ago   Lifestyle    Physical activity     Days per week: Not on file     Minutes per session: Not on file    Stress: Not on file   Relationships    Social connections     Talks on phone: Not on file     Gets together: Not on file     Attends [employer] service: Not on file Active member of club or organization: Not on file     Attends meetings of clubs or organizations: Not on file     Relationship status: Not on file    Intimate partner violence     Fear of current or ex partner: Not on file     Emotionally abused: Not on file     Physically abused: Not on file     Forced sexual activity: Not on file   Other Topics Concern    Not on file   Social History Narrative    Not on file         ALLERGIES: Patient has no known allergies. Review of Systems   Constitutional: Negative. Negative for chills and fever. Respiratory: Positive for cough, shortness of breath and wheezing. Cardiovascular: Positive for chest pain. Gastrointestinal: Negative. Negative for abdominal pain, nausea and vomiting. Musculoskeletal: Positive for back pain. Negative for myalgias. Skin: Negative. Negative for rash. Neurological: Negative. Negative for dizziness, weakness and light-headedness. All other systems reviewed and are negative. Vitals:    11/16/20 2141 11/16/20 2145 11/16/20 2150 11/17/20 0230   BP: (!) 175/83 (!) 179/81  (!) 161/58   Pulse:  86     Resp:  24     Temp:   98.3 °F (36.8 °C)    SpO2: 95% 95%  94%            Physical Exam  Vitals signs reviewed. Constitutional:       General: She is not in acute distress. Appearance: Normal appearance. She is well-developed. HENT:      Head: Normocephalic and atraumatic. Nose:      Comments: Nasal cannula in place  Eyes:      Extraocular Movements: Extraocular movements intact. Conjunctiva/sclera: Conjunctivae normal.      Pupils: Pupils are equal, round, and reactive to light. Neck:      Musculoskeletal: Normal range of motion and neck supple. Cardiovascular:      Rate and Rhythm: Normal rate and regular rhythm. Heart sounds: S1 normal and S2 normal. No murmur. No friction rub. No gallop. Pulmonary:      Effort: Tachypnea and accessory muscle usage present. No respiratory distress.       Comments: Mildly tachypneic with inspiratory and expiratory wheezing in all lung fields with a prolonged expiratory phase. Patient able to speak in full sentences. There is minimal accessory abdominal muscle use with respirations, however no retractions. Does not appear to be in severe distress. Abdominal:      General: There is no distension. Tenderness: There is no abdominal tenderness. Musculoskeletal: Normal range of motion. Comments: Diffuse bilateral lumbar paraspinal muscle tenderness. No midline point tenderness, step-off, or deformity. Skin:     General: Skin is warm. Findings: No rash. Neurological:      General: No focal deficit present. Mental Status: She is alert and oriented to person, place, and time. Psychiatric:         Speech: Speech normal.          MDM  Number of Diagnoses or Management Options  Acute bronchitis, unspecified organism:   Acute exacerbation of chronic obstructive pulmonary disease (COPD) ([facility] [address]): Atypical chest pain:   Diagnosis management comments: James Guardado is a 63 y.o. female with history of COPD coming in with cough and wheezing. Also has chronic chest pain back pain which is unchanged. Was seen this morning, treated for COPD exacerbation and improved with once again worsening this evening. Vitals remained stable. Patient has good O2 sats on her home O2. Patient received 125 of Solu-Medrol this morning. She was also sent with a prescription for prednisone which she should start tomorrow morning. We will give additional nebulizer treatments and a dose of magnesium sulfate. We will get work-up to rule out ACS. Will add doxycycline for atypical antibiotic coverage. Pending Covid-19 test.  Low suspicion of PE given the obvious obstructive lung disease symptoms and improvement with duo nebs. Patient subjectively improved with duo nebs. Also gave some p.o. prednisone as its been about half hour since her last dose of steroids.   She also was given a dose of mag. She continues to have some and expiratory wheezing on exam, however does not appear to have any seemingly increased work of breathing at this time. I do feel that she can safely be discharged home at this time. I have counseled her follow-up with her regular doctor and use her home albuterol nebulizer. Also started her on doxycycline to cover for atypicals. Instructed that she needs to get her prednisone filled and start taking that tomorrow. She verbalizes understanding and agrees with this plan.          Procedures      Vitals:  Patient Vitals for the past 12 hrs:   Temp Pulse Resp BP SpO2   11/17/20 0230    (!) 161/58 94 %   11/16/20 2150 98.3 °F (36.8 °C)       11/16/20 2145  86 24 (!) 179/81 95 %   11/16/20 2141    (!) 175/83 95 %       Medications ordered:   Medications   albuterol-ipratropium (DUO-NEB) 2.5 MG-0.5 MG/3 ML (3 mL Nebulization Given 11/16/20 2253)   albuterol-ipratropium (DUO-NEB) 2.5 mg-0.5 mg/3 ml nebulizer solution (has no administration in time range)   magnesium sulfate 2 g/50 ml IVPB (premix or compounded) (2 g IntraVENous New Bag 11/17/20 0300)   oxyCODONE-acetaminophen (PERCOCET) 5-325 mg per tablet 1 Tab (1 Tab Oral Given 11/16/20 2254)   doxycycline (VIBRAMYCIN) capsule 100 mg (100 mg Oral Given 11/17/20 0509)   predniSONE (DELTASONE) tablet 60 mg (60 mg Oral Given 11/17/20 0509)   oxyCODONE-acetaminophen (PERCOCET) 5-325 mg per tablet 1 Tab (1 Tab Oral Given 11/17/20 0517)         Lab findings:  Recent Results (from the past 12 hour(s))   EKG, 12 LEAD, INITIAL    Collection Time: 11/16/20 10:47 PM   Result Value Ref Range    Ventricular Rate 75 BPM    Atrial Rate 75 BPM    QRS Duration 80 ms    Q-T Interval 468 ms    QTC Calculation (Bezet) 522 ms    Calculated R Axis 59 degrees    Calculated T Axis 55 degrees    Diagnosis       Accelerated Junctional rhythm  Minimal voltage criteria for LVH, may be normal variant  ST & T wave abnormality, consider inferior ischemia  Abnormal ECG  When compared with ECG of 16-NOV-2020 05:42,  Junctional rhythm has replaced Sinus rhythm  ST elevation now present in Inferior leads  T wave inversion now evident in Anterior leads  QT has lengthened     CBC WITH AUTOMATED DIFF    Collection Time: 11/17/20  3:00 AM   Result Value Ref Range    WBC 8.7 4.6 - 13.2 K/uL    RBC 4.36 4.20 - 5.30 M/uL    HGB 12.9 12.0 - 16.0 g/dL    HCT 38.2 35.0 - 45.0 %    MCV 87.6 74.0 - 97.0 FL    MCH 29.6 24.0 - 34.0 PG    MCHC 33.8 31.0 - 37.0 g/dL    RDW 12.3 11.6 - 14.5 %    PLATELET 575 646 - 603 K/uL    MPV 10.9 9.2 - 11.8 FL    NEUTROPHILS 79 (H) 40 - 73 %    LYMPHOCYTES 15 (L) 21 - 52 %    MONOCYTES 6 3 - 10 %    EOSINOPHILS 0 0 - 5 %    BASOPHILS 0 0 - 2 %    ABS. NEUTROPHILS 6.9 1.8 - 8.0 K/UL    ABS. LYMPHOCYTES 1.3 0.9 - 3.6 K/UL    ABS. MONOCYTES 0.5 0.05 - 1.2 K/UL    ABS. EOSINOPHILS 0.0 0.0 - 0.4 K/UL    ABS.  BASOPHILS 0.0 0.0 - 0.1 K/UL    DF AUTOMATED     METABOLIC PANEL, BASIC    Collection Time: 11/17/20  3:00 AM   Result Value Ref Range    Sodium 140 136 - 145 mmol/L    Potassium 3.8 3.5 - 5.5 mmol/L    Chloride 106 100 - 111 mmol/L    CO2 28 21 - 32 mmol/L    Anion gap 6 3.0 - 18 mmol/L    Glucose 145 (H) 74 - 99 mg/dL    BUN 11 7.0 - 18 MG/DL    Creatinine 0.87 0.6 - 1.3 MG/DL    BUN/Creatinine ratio 13 12 - 20      GFR est AA >60 >60 ml/min/1.73m2    GFR est non-AA >60 >60 ml/min/1.73m2    Calcium 8.8 8.5 - 10.1 MG/DL   MAGNESIUM    Collection Time: 11/17/20  3:00 AM   Result Value Ref Range    Magnesium 2.4 1.6 - 2.6 mg/dL   CARDIAC PANEL,(CK, CKMB & TROPONIN)    Collection Time: 11/17/20  3:00 AM   Result Value Ref Range    CK - MB 2.1 <3.6 ng/ml    CK-MB Index 3.6 0.0 - 4.0 %    CK 58 26 - 192 U/L    Troponin-I, QT <0.02 0.0 - 0.045 NG/ML   EKG, 12 LEAD, SUBSEQUENT    Collection Time: 11/17/20  5:13 AM   Result Value Ref Range    Ventricular Rate 80 BPM    Atrial Rate 80 BPM    P-R Interval 136 ms    QRS Duration 88 ms    Q-T Interval 440 ms    QTC Calculation (Bezet) 507 ms    Calculated P Axis 71 degrees    Calculated R Axis 51 degrees    Calculated T Axis 16 degrees    Diagnosis       Sinus rhythm with occasional premature ventricular complexes  Minimal voltage criteria for LVH, may be normal variant  T wave abnormality, consider inferior ischemia  Prolonged QT  Abnormal ECG  When compared with ECG of 16-NOV-2020 22:47,  Sinus rhythm has replaced Junctional rhythm  ST no longer elevated in Inferior leads  T wave inversion no longer evident in Anterolateral leads         EKG interpretation by ED Physician: Sinus rhythm rate of 75 bpm.  Poor quality EKG. Nondiagnostic. Sinus rhythm rate of 80 bpm.  LVH. X-Ray, CT or other radiology findings or impressions:  XR CHEST PORT    (Results Pending)       Disposition:  Diagnosis:   1. Acute bronchitis, unspecified organism    2. Acute exacerbation of chronic obstructive pulmonary disease (COPD) ([address]    3. Atypical chest pain        Disposition: Discharge      Follow-up Information     Follow up With Specialties Details Why Contact Info    Tori Salgado NP Nurse Practitioner Schedule an appointment as soon as possible for a visit for office follow up [address] [place] [facility] EMERGENCY DEPT Emergency Medicine  If symptoms worsen [address]  [phone]           Patient's Medications   Start Taking    No medications on file   Continue Taking    ALBUTEROL (PROVENTIL HFA, VENTOLIN HFA, PROAIR HFA) 90 MCG/ACTUATION INHALER    Take 2 Puffs by inhalation every four (4) hours as needed for Wheezing. ALBUTEROL (PROVENTIL VENTOLIN) 2.5 MG /3 ML (0.083 %) NEBU    3 mL by Nebulization route every four (4) hours as needed for Wheezing. ALBUTEROL SULFATE 90 MCG/ACTUATION AEBS    Take 2 Puffs by inhalation every four to six (4-6) hours as needed for Wheezing for up to 30 days.     ALBUTEROL-IPRATROPIUM (DUO-NEB) 2.5 MG-0.5 MG/3 ML NEBU    3 mL by Nebulization route every four (4) hours as needed for Wheezing, Shortness of Breath or Cough. ALPRAZOLAM (XANAX) 0.5 MG TABLET    Take 1 Tab by mouth every eight (8) hours as needed for Anxiety. Max Daily Amount: 1.5 mg. ARFORMOTEROL (BROVANA) 15 MCG/2 ML NEBU NEB SOLUTION    2 mL by Nebulization route two (2) times a day. Indications: bronchospasm prevention with COPD    ASPIRIN DELAYED-RELEASE 81 MG TABLET    Take 1 Tab by mouth daily. ATORVASTATIN (LIPITOR) 20 MG TABLET    Take 1 Tab by mouth nightly. BUDESONIDE (PULMICORT) 0.5 MG/2 ML NBSP    2 mL by Nebulization route two (2) times a day. CITALOPRAM (CELEXA) 20 MG TABLET    1 tablet    CLONIDINE HCL (CATAPRES) 0.2 MG TABLET    Take 1 Tab by mouth two (2) times a day for 30 days. DOCUSATE SODIUM (COLACE) 100 MG CAPSULE    Take 1 Cap by mouth two (2) times a day. FAMOTIDINE (PEPCID) 20 MG TABLET    Take 1 Tab by mouth daily. FLUTICASONE FUROATE-VILANTEROL (BREO ELLIPTA) 200-25 MCG/DOSE INHALER    Take 1 Puff by inhalation daily. GEMFIBROZIL (LOPID) 600 MG TABLET    1 tablet    MONTELUKAST (SINGULAIR) 10 MG TABLET    Take 1 Tab by mouth nightly. NALOXONE (NARCAN) 4 MG/ACTUATION NASAL SPRAY    Use 1 spray intranasally, then discard. Repeat with new spray every 2 min as needed for opioid overdose symptoms, alternating nostrils. NITROGLYCERIN (NITROSTAT) 0.4 MG SL TABLET    Take 1 Tab by mouth every five (5) minutes as needed for Chest Pain. Sit/Lay down then put one tab under the tongue every 5 minutes as needed for chest pain for 3 doses    OXYGEN-AIR DELIVERY SYSTEMS    3 L by IntraNASal route as needed for Other (sob). PREDNISONE (DELTASONE) 20 MG TABLET    Take 60 mg by mouth daily for 5 days.  With Breakfast    PREDNISONE (STERAPRED DS) 10 MG DOSE PACK    6 day dose pack per package instructions    QUETIAPINE (SEROQUEL) 50 MG TABLET    TAKE 1 TABLET BY MOUTH AT BEDTIME FOR MOOD    ROFLUMILAST (DALIRESP) 500 MCG TAB TABLET    Take 1 Tab by mouth daily. SUCRALFATE (CARAFATE) 100 MG/ML SUSPENSION    Take 1,000 mg by mouth. These Medications have changed    Modified Medication Previous Medication    DOXYCYCLINE (MONODOX) 100 MG CAPSULE doxycycline (MONODOX) 100 mg capsule       Take 1 Cap by mouth two (2) times a day for 7 days. Take 1 Cap by mouth two (2) times a day for 7 days.    Stop Taking    No medications on file

## 2020-11-17 NOTE — DISCHARGE INSTRUCTIONS
Patient Education        Chronic Obstructive Pulmonary Disease (COPD): Care Instructions  Your Care Instructions     Chronic obstructive pulmonary disease (COPD) is a general term for a group of lung diseases, including emphysema and chronic bronchitis. People with COPD have decreased airflow in and out of the lungs, which makes it hard to breathe. The airways also can get clogged with thick mucus. Cigarette smoking is a major cause of COPD. Although there is no cure for COPD, you can slow its progress. Following your treatment plan and taking care of yourself can help you feel better and live longer. Follow-up care is a key part of your treatment and safety. Be sure to make and go to all appointments, and call your doctor if you are having problems. It's also a good idea to know your test results and keep a list of the medicines you take. How can you care for yourself at home? Staying healthy    · Do not smoke. This is the most important step you can take to prevent more damage to your lungs. If you need help quitting, talk to your doctor about stop-smoking programs and medicines. These can increase your chances of quitting for good.     · Avoid colds and flu. Get a pneumococcal vaccine shot. If you have had one before, ask your doctor whether you need a second dose. Get the flu vaccine every fall. If you must be around people with colds or the flu, wash your hands often.     · Avoid secondhand smoke, air pollution, and high altitudes. Also avoid cold, dry air and hot, humid air. Stay at home with your windows closed when air pollution is bad. Medicines and oxygen therapy    · Take your medicines exactly as prescribed. Call your doctor if you think you are having a problem with your medicine. You may be taking medicines such as:  ? Bronchodilators. These help open your airways and make breathing easier. They are either short-acting (work for 6 to 9 hours) or long-acting (work for 24 hours).  You inhale most bronchodilators, so they start to act quickly. Always carry your quick-relief inhaler with you in case you need it while you are away from home. ? Corticosteroids (prednisone, budesonide). These reduce airway inflammation. They come in pill or inhaled form. You must take these medicines every day for them to work well.     · Ask your doctor or pharmacist if a spacer is right for you. A spacer may help you get more inhaled medicine to your lungs. If you use one, ask how to use it properly.     · Do not take any vitamins, over-the-counter medicine, or herbal products without talking to your doctor first.     · If your doctor prescribed antibiotics, take them as directed. Do not stop taking them just because you feel better. You need to take the full course of antibiotics.     · If you use oxygen therapy, use the flow rate your doctor has recommended. Don't change it without talking to your doctor first. Oxygen therapy boosts the amount of oxygen in your blood and helps you breathe easier. Activity    · Get regular exercise. Walking is an easy way to get exercise. Start out slowly, and walk a little more each day.     · Pay attention to your breathing. You are exercising too hard if you can't talk while you exercise.     · Take short rest breaks when doing household chores and other activities.     · Learn breathing methods--such as breathing through pursed lips--to help you become less short of breath.     · If your doctor has not set you up with a pulmonary rehabilitation program, ask if rehab is right for you. Rehab includes exercise programs, education about your disease and how to manage it, help with diet and other changes, and emotional support. Diet    · Eat regular, healthy meals. Use bronchodilators about 1 hour before you eat to make it easier to eat. Eat several small meals instead of three large ones.  Drink beverages at the end of the meal. Avoid foods that are hard to chew.     · Eat foods that contain protein so you don't lose muscle mass.     · Talk with your doctor if you gain too much weight or if you lose weight without trying. Mental health    · Talk to your family, friends, or a therapist about your feelings. Some people feel frightened, angry, hopeless, helpless, and even guilty. Talking openly about bad feelings can help you cope. If these feelings last, talk to your doctor. When should you call for help? Call 911 anytime you think you may need emergency care. For example, call if:    · You have severe trouble breathing. Call your doctor now or seek immediate medical care if:    · You have new or worse trouble breathing.     · You cough up blood.     · You have a fever. Watch closely for changes in your health, and be sure to contact your doctor if:    · You cough more deeply or more often, especially if you notice more mucus or a change in the color of your mucus.     · You have new or worse swelling in your legs or belly.     · You are not getting better as expected. Where can you learn more? Go to http://www.gray.com/  Enter G945 in the search box to learn more about \"Chronic Obstructive Pulmonary Disease (COPD): Care Instructions. \"  Current as of: February 24, 2020               Content Version: 12.6  © 2006-2020 [employer]. Care instructions adapted under license by [employer] (which disclaims liability or warranty for this information). If you have questions about a medical condition or this instruction, always ask your healthcare professional. [place] [address] any warranty or liability for your use of this information. Patient Education        Bronchitis: Care Instructions  Your Care Instructions     Bronchitis is inflammation of the bronchial tubes, which carry air to the lungs. The tubes swell and produce mucus, or phlegm. The mucus and inflamed bronchial tubes make you cough.  You may have trouble breathing. Most cases of bronchitis are caused by viruses like those that cause colds. Antibiotics usually do not help and they may be harmful. Bronchitis usually develops rapidly and lasts about 2 to 3 weeks in otherwise healthy people. Follow-up care is a key part of your treatment and safety. Be sure to make and go to all appointments, and call your doctor if you are having problems. It's also a good idea to know your test results and keep a list of the medicines you take. How can you care for yourself at home? · Take all medicines exactly as prescribed. Call your doctor if you think you are having a problem with your medicine. · Get some extra rest.  · Take an over-the-counter pain medicine, such as acetaminophen (Tylenol), ibuprofen (Advil, Motrin), or naproxen (Aleve) to reduce fever and relieve body aches. Read and follow all instructions on the label. · Do not take two or more pain medicines at the same time unless the doctor told you to. Many pain medicines have acetaminophen, which is Tylenol. Too much acetaminophen (Tylenol) can be harmful. · Take an over-the-counter cough medicine that contains dextromethorphan to help quiet a dry, hacking cough so that you can sleep. Avoid cough medicines that have more than one active ingredient. Read and follow all instructions on the label. · Breathe moist air from a humidifier, hot shower, or sink filled with hot water. The heat and moisture will thin mucus so you can cough it out. · Do not smoke. Smoking can make bronchitis worse. If you need help quitting, talk to your doctor about stop-smoking programs and medicines. These can increase your chances of quitting for good. When should you call for help? Call 911 anytime you think you may need emergency care. For example, call if:    · You have severe trouble breathing.    Call your doctor now or seek immediate medical care if:    · You have new or worse trouble breathing.     · You cough up dark brown or bloody mucus (sputum).     · You have a new or higher fever.     · You have a new rash. Watch closely for changes in your health, and be sure to contact your doctor if:    · You cough more deeply or more often, especially if you notice more mucus or a change in the color of your mucus.     · You are not getting better as expected. Where can you learn more? Go to http://www.gray.com/  Enter H333 in the search box to learn more about \"Bronchitis: Care Instructions. \"  Current as of: February 24, 2020               Content Version: 12.6  © 7787-3073 [employer]. Care instructions adapted under license by [employer] (which disclaims liability or warranty for this information). If you have questions about a medical condition or this instruction, always ask your healthcare professional. [address] any warranty or liability for your use of this information.

## 2020-11-17 NOTE — ED TRIAGE NOTES
PT arrived via EMS with complaint of SOB. Medics admin 1 albuterol via nebulizer.  PT reports using own MDI prior to EMS arrival.

## 2020-11-18 ENCOUNTER — PATIENT OUTREACH (OUTPATIENT)
Dept: CASE MANAGEMENT | Age: 55
End: 2020-11-18

## 2020-11-18 LAB — SARS-COV-2, COV2NT: NOT DETECTED

## 2020-11-18 NOTE — PROGRESS NOTES
Patient contacted regarding COVID-19  risk. Second attempt. Unable to reach patient at contact number on file. Episode of care resolved.

## 2020-11-25 PROCEDURE — 96376 TX/PRO/DX INJ SAME DRUG ADON: CPT

## 2020-11-28 ENCOUNTER — APPOINTMENT (OUTPATIENT)
Dept: GENERAL RADIOLOGY | Age: 55
End: 2020-11-28
Attending: EMERGENCY MEDICINE
Payer: MEDICAID

## 2020-11-28 ENCOUNTER — HOSPITAL ENCOUNTER (OUTPATIENT)
Age: 55
Setting detail: OBSERVATION
Discharge: HOME HEALTH | End: 2020-12-01
Attending: EMERGENCY MEDICINE | Admitting: INTERNAL MEDICINE
Payer: MEDICAID

## 2020-11-28 DIAGNOSIS — F41.1 ANXIETY REACTION: ICD-10-CM

## 2020-11-28 DIAGNOSIS — M79.7 FIBROMYALGIA: ICD-10-CM

## 2020-11-28 DIAGNOSIS — R07.9 CHEST PAIN, UNSPECIFIED TYPE: ICD-10-CM

## 2020-11-28 DIAGNOSIS — J44.1 ACUTE EXACERBATION OF CHRONIC OBSTRUCTIVE PULMONARY DISEASE (COPD) (HCC): Primary | ICD-10-CM

## 2020-11-28 PROBLEM — J20.9 ACUTE BRONCHITIS WITH CHRONIC OBSTRUCTIVE PULMONARY DISEASE (COPD) (HCC): Status: ACTIVE | Noted: 2020-11-28

## 2020-11-28 PROBLEM — J44.0 ACUTE BRONCHITIS WITH CHRONIC OBSTRUCTIVE PULMONARY DISEASE (COPD) (HCC): Status: ACTIVE | Noted: 2020-11-28

## 2020-11-28 LAB
ALBUMIN SERPL-MCNC: 3.4 G/DL (ref 3.4–5)
ALBUMIN/GLOB SERPL: 1.1 {RATIO} (ref 0.8–1.7)
ALP SERPL-CCNC: 83 U/L (ref 45–117)
ALT SERPL-CCNC: 20 U/L (ref 13–56)
ANION GAP SERPL CALC-SCNC: 3 MMOL/L (ref 3–18)
ARTERIAL PATENCY WRIST A: ABNORMAL
AST SERPL-CCNC: 15 U/L (ref 10–38)
ATRIAL RATE: 63 BPM
BASE EXCESS BLD CALC-SCNC: 3 MMOL/L
BASOPHILS # BLD: 0 K/UL (ref 0–0.1)
BASOPHILS NFR BLD: 0 % (ref 0–2)
BDY SITE: ABNORMAL
BILIRUB SERPL-MCNC: 0.4 MG/DL (ref 0.2–1)
BODY TEMPERATURE: 98
BUN SERPL-MCNC: 14 MG/DL (ref 7–18)
BUN/CREAT SERPL: 15 (ref 12–20)
CALCIUM SERPL-MCNC: 8.4 MG/DL (ref 8.5–10.1)
CALCULATED P AXIS, ECG09: 80 DEGREES
CALCULATED R AXIS, ECG10: 31 DEGREES
CALCULATED T AXIS, ECG11: 58 DEGREES
CHLORIDE SERPL-SCNC: 110 MMOL/L (ref 100–111)
CK MB CFR SERPL CALC: 1.4 % (ref 0–4)
CK MB CFR SERPL CALC: 2.5 % (ref 0–4)
CK MB SERPL-MCNC: 2.1 NG/ML (ref 5–25)
CK MB SERPL-MCNC: 2.6 NG/ML (ref 5–25)
CK SERPL-CCNC: 106 U/L (ref 26–192)
CK SERPL-CCNC: 150 U/L (ref 26–192)
CO2 SERPL-SCNC: 31 MMOL/L (ref 21–32)
CREAT SERPL-MCNC: 0.93 MG/DL (ref 0.6–1.3)
DIAGNOSIS, 93000: NORMAL
DIFFERENTIAL METHOD BLD: ABNORMAL
EOSINOPHIL # BLD: 0.2 K/UL (ref 0–0.4)
EOSINOPHIL NFR BLD: 3 % (ref 0–5)
ERYTHROCYTE [DISTWIDTH] IN BLOOD BY AUTOMATED COUNT: 12.8 % (ref 11.6–14.5)
GAS FLOW.O2 O2 DELIVERY SYS: ABNORMAL L/MIN
GAS FLOW.O2 SETTING OXYMISER: 8 L/M
GLOBULIN SER CALC-MCNC: 3 G/DL (ref 2–4)
GLUCOSE SERPL-MCNC: 90 MG/DL (ref 74–99)
HCO3 BLD-SCNC: 29.2 MMOL/L (ref 22–26)
HCT VFR BLD AUTO: 34.6 % (ref 35–45)
HGB BLD-MCNC: 11.3 G/DL (ref 12–16)
LYMPHOCYTES # BLD: 1.8 K/UL (ref 0.9–3.6)
LYMPHOCYTES NFR BLD: 32 % (ref 21–52)
MCH RBC QN AUTO: 29.5 PG (ref 24–34)
MCHC RBC AUTO-ENTMCNC: 32.7 G/DL (ref 31–37)
MCV RBC AUTO: 90.3 FL (ref 74–97)
MONOCYTES # BLD: 0.4 K/UL (ref 0.05–1.2)
MONOCYTES NFR BLD: 7 % (ref 3–10)
NEUTS SEG # BLD: 3.3 K/UL (ref 1.8–8)
NEUTS SEG NFR BLD: 58 % (ref 40–73)
O2/TOTAL GAS SETTING VFR VENT: 50 %
P-R INTERVAL, ECG05: 132 MS
PCO2 BLD: 51.8 MMHG (ref 35–45)
PH BLD: 7.36 [PH] (ref 7.35–7.45)
PLATELET # BLD AUTO: 172 K/UL (ref 135–420)
PMV BLD AUTO: 10.5 FL (ref 9.2–11.8)
PO2 BLD: 89 MMHG (ref 80–100)
POTASSIUM SERPL-SCNC: 3.7 MMOL/L (ref 3.5–5.5)
PROT SERPL-MCNC: 6.4 G/DL (ref 6.4–8.2)
Q-T INTERVAL, ECG07: 480 MS
QRS DURATION, ECG06: 84 MS
QTC CALCULATION (BEZET), ECG08: 491 MS
RBC # BLD AUTO: 3.83 M/UL (ref 4.2–5.3)
SAO2 % BLD: 96 % (ref 92–97)
SERVICE CMNT-IMP: ABNORMAL
SODIUM SERPL-SCNC: 144 MMOL/L (ref 136–145)
SPECIMEN TYPE: ABNORMAL
TOTAL RESP. RATE, ITRR: 18
TROPONIN I SERPL-MCNC: <0.02 NG/ML (ref 0–0.04)
TROPONIN I SERPL-MCNC: <0.02 NG/ML (ref 0–0.04)
VENTRICULAR RATE, ECG03: 63 BPM
WBC # BLD AUTO: 5.7 K/UL (ref 4.6–13.2)

## 2020-11-28 PROCEDURE — 77010033678 HC OXYGEN DAILY

## 2020-11-28 PROCEDURE — 99285 EMERGENCY DEPT VISIT HI MDM: CPT

## 2020-11-28 PROCEDURE — 2709999900 HC NON-CHARGEABLE SUPPLY

## 2020-11-28 PROCEDURE — 99223 1ST HOSP IP/OBS HIGH 75: CPT | Performed by: NURSE PRACTITIONER

## 2020-11-28 PROCEDURE — 96375 TX/PRO/DX INJ NEW DRUG ADDON: CPT

## 2020-11-28 PROCEDURE — 96374 THER/PROPH/DIAG INJ IV PUSH: CPT

## 2020-11-28 PROCEDURE — 99218 HC RM OBSERVATION: CPT

## 2020-11-28 PROCEDURE — 77030038269 HC DRN EXT URIN PURWCK BARD -A

## 2020-11-28 PROCEDURE — 65660000000 HC RM CCU STEPDOWN

## 2020-11-28 PROCEDURE — 80053 COMPREHEN METABOLIC PANEL: CPT

## 2020-11-28 PROCEDURE — 85025 COMPLETE CBC W/AUTO DIFF WBC: CPT

## 2020-11-28 PROCEDURE — 82803 BLOOD GASES ANY COMBINATION: CPT

## 2020-11-28 PROCEDURE — 74011250636 HC RX REV CODE- 250/636: Performed by: EMERGENCY MEDICINE

## 2020-11-28 PROCEDURE — 82550 ASSAY OF CK (CPK): CPT

## 2020-11-28 PROCEDURE — 74011250637 HC RX REV CODE- 250/637: Performed by: EMERGENCY MEDICINE

## 2020-11-28 PROCEDURE — 74011250637 HC RX REV CODE- 250/637: Performed by: NURSE PRACTITIONER

## 2020-11-28 PROCEDURE — 74011250636 HC RX REV CODE- 250/636: Performed by: NURSE PRACTITIONER

## 2020-11-28 PROCEDURE — 36600 WITHDRAWAL OF ARTERIAL BLOOD: CPT

## 2020-11-28 PROCEDURE — 93005 ELECTROCARDIOGRAM TRACING: CPT

## 2020-11-28 PROCEDURE — 71045 X-RAY EXAM CHEST 1 VIEW: CPT

## 2020-11-28 PROCEDURE — 94762 N-INVAS EAR/PLS OXIMTRY CONT: CPT

## 2020-11-28 PROCEDURE — 96376 TX/PRO/DX INJ SAME DRUG ADON: CPT

## 2020-11-28 PROCEDURE — 74011000250 HC RX REV CODE- 250: Performed by: EMERGENCY MEDICINE

## 2020-11-28 PROCEDURE — 74011000250 HC RX REV CODE- 250: Performed by: NURSE PRACTITIONER

## 2020-11-28 RX ORDER — FAMOTIDINE 20 MG/1
20 TABLET, FILM COATED ORAL
Status: DISCONTINUED | OUTPATIENT
Start: 2020-11-28 | End: 2020-11-29

## 2020-11-28 RX ORDER — INSULIN LISPRO 100 [IU]/ML
INJECTION, SOLUTION INTRAVENOUS; SUBCUTANEOUS
Status: DISCONTINUED | OUTPATIENT
Start: 2020-11-28 | End: 2020-12-01 | Stop reason: HOSPADM

## 2020-11-28 RX ORDER — IPRATROPIUM BROMIDE AND ALBUTEROL SULFATE 2.5; .5 MG/3ML; MG/3ML
3 SOLUTION RESPIRATORY (INHALATION)
Status: DISCONTINUED | OUTPATIENT
Start: 2020-11-28 | End: 2020-12-01 | Stop reason: HOSPADM

## 2020-11-28 RX ORDER — ASPIRIN 325 MG
325 TABLET ORAL DAILY
Status: DISCONTINUED | OUTPATIENT
Start: 2020-11-28 | End: 2020-12-01 | Stop reason: HOSPADM

## 2020-11-28 RX ORDER — CLONIDINE HYDROCHLORIDE 0.2 MG/1
0.2 TABLET ORAL 3 TIMES DAILY
COMMUNITY
Start: 2020-10-01 | End: 2021-01-29 | Stop reason: SDUPTHER

## 2020-11-28 RX ORDER — LISINOPRIL 20 MG/1
20 TABLET ORAL DAILY
COMMUNITY
Start: 2020-10-01 | End: 2020-12-30

## 2020-11-28 RX ORDER — PROMETHAZINE HYDROCHLORIDE 25 MG/1
12.5 TABLET ORAL
Status: DISCONTINUED | OUTPATIENT
Start: 2020-11-28 | End: 2020-12-01 | Stop reason: HOSPADM

## 2020-11-28 RX ORDER — SODIUM CHLORIDE 0.9 % (FLUSH) 0.9 %
5-40 SYRINGE (ML) INJECTION EVERY 8 HOURS
Status: DISCONTINUED | OUTPATIENT
Start: 2020-11-28 | End: 2020-12-01 | Stop reason: HOSPADM

## 2020-11-28 RX ORDER — ATORVASTATIN CALCIUM 20 MG/1
20 TABLET, FILM COATED ORAL
Status: DISCONTINUED | OUTPATIENT
Start: 2020-11-28 | End: 2020-12-01 | Stop reason: HOSPADM

## 2020-11-28 RX ORDER — ENOXAPARIN SODIUM 100 MG/ML
40 INJECTION SUBCUTANEOUS DAILY
Status: DISCONTINUED | OUTPATIENT
Start: 2020-11-29 | End: 2020-12-01 | Stop reason: HOSPADM

## 2020-11-28 RX ORDER — MAGNESIUM SULFATE 100 %
4 CRYSTALS MISCELLANEOUS AS NEEDED
Status: DISCONTINUED | OUTPATIENT
Start: 2020-11-28 | End: 2020-12-01 | Stop reason: HOSPADM

## 2020-11-28 RX ORDER — AMLODIPINE BESYLATE 10 MG/1
10 TABLET ORAL DAILY
COMMUNITY
Start: 2020-10-01 | End: 2020-12-30 | Stop reason: SDUPTHER

## 2020-11-28 RX ORDER — SODIUM CHLORIDE 0.9 % (FLUSH) 0.9 %
5-40 SYRINGE (ML) INJECTION AS NEEDED
Status: DISCONTINUED | OUTPATIENT
Start: 2020-11-28 | End: 2020-12-01 | Stop reason: HOSPADM

## 2020-11-28 RX ORDER — PREDNISONE 50 MG/1
50 TABLET ORAL DAILY
Qty: 3 TAB | Refills: 0 | Status: SHIPPED | OUTPATIENT
Start: 2020-11-28 | End: 2020-11-30 | Stop reason: SDUPTHER

## 2020-11-28 RX ORDER — FUROSEMIDE 10 MG/ML
40 INJECTION INTRAMUSCULAR; INTRAVENOUS ONCE
Status: COMPLETED | OUTPATIENT
Start: 2020-11-28 | End: 2020-11-28

## 2020-11-28 RX ORDER — ACETAMINOPHEN 650 MG/1
650 SUPPOSITORY RECTAL
Status: DISCONTINUED | OUTPATIENT
Start: 2020-11-28 | End: 2020-12-01 | Stop reason: HOSPADM

## 2020-11-28 RX ORDER — POLYETHYLENE GLYCOL 3350 17 G/17G
17 POWDER, FOR SOLUTION ORAL DAILY PRN
Status: DISCONTINUED | OUTPATIENT
Start: 2020-11-28 | End: 2020-12-01 | Stop reason: HOSPADM

## 2020-11-28 RX ORDER — DEXTROSE 50 % IN WATER (D50W) INTRAVENOUS SYRINGE
25-50 AS NEEDED
Status: DISCONTINUED | OUTPATIENT
Start: 2020-11-28 | End: 2020-12-01 | Stop reason: HOSPADM

## 2020-11-28 RX ORDER — ACETAMINOPHEN 325 MG/1
650 TABLET ORAL
Status: DISCONTINUED | OUTPATIENT
Start: 2020-11-28 | End: 2020-12-01 | Stop reason: HOSPADM

## 2020-11-28 RX ORDER — ALBUTEROL SULFATE 0.83 MG/ML
2.5 SOLUTION RESPIRATORY (INHALATION)
Qty: 30 EACH | Refills: 0 | Status: SHIPPED | OUTPATIENT
Start: 2020-11-28 | End: 2020-11-28

## 2020-11-28 RX ORDER — LISINOPRIL 20 MG/1
20 TABLET ORAL DAILY
Status: DISCONTINUED | OUTPATIENT
Start: 2020-11-28 | End: 2020-12-01 | Stop reason: HOSPADM

## 2020-11-28 RX ORDER — CLONIDINE HYDROCHLORIDE 0.1 MG/1
0.2 TABLET ORAL 3 TIMES DAILY
Status: DISCONTINUED | OUTPATIENT
Start: 2020-11-28 | End: 2020-12-01 | Stop reason: HOSPADM

## 2020-11-28 RX ORDER — IPRATROPIUM BROMIDE AND ALBUTEROL SULFATE 2.5; .5 MG/3ML; MG/3ML
3 SOLUTION RESPIRATORY (INHALATION)
Status: COMPLETED | OUTPATIENT
Start: 2020-11-28 | End: 2020-11-28

## 2020-11-28 RX ORDER — GEMFIBROZIL 600 MG/1
600 TABLET, FILM COATED ORAL
Status: DISCONTINUED | OUTPATIENT
Start: 2020-11-28 | End: 2020-12-01 | Stop reason: HOSPADM

## 2020-11-28 RX ORDER — IPRATROPIUM BROMIDE AND ALBUTEROL SULFATE 2.5; .5 MG/3ML; MG/3ML
SOLUTION RESPIRATORY (INHALATION)
Status: DISPENSED
Start: 2020-11-28 | End: 2020-11-29

## 2020-11-28 RX ADMIN — FUROSEMIDE 40 MG: 10 INJECTION, SOLUTION INTRAMUSCULAR; INTRAVENOUS at 13:31

## 2020-11-28 RX ADMIN — METHYLPREDNISOLONE SODIUM SUCCINATE 125 MG: 125 INJECTION, POWDER, FOR SOLUTION INTRAMUSCULAR; INTRAVENOUS at 11:24

## 2020-11-28 RX ADMIN — GEMFIBROZIL 600 MG: 600 TABLET ORAL at 18:09

## 2020-11-28 RX ADMIN — METHYLPREDNISOLONE SODIUM SUCCINATE 40 MG: 40 INJECTION, POWDER, FOR SOLUTION INTRAMUSCULAR; INTRAVENOUS at 22:35

## 2020-11-28 RX ADMIN — IPRATROPIUM BROMIDE AND ALBUTEROL SULFATE 3 ML: .5; 3 SOLUTION RESPIRATORY (INHALATION) at 18:23

## 2020-11-28 RX ADMIN — METHYLPREDNISOLONE SODIUM SUCCINATE 40 MG: 40 INJECTION, POWDER, FOR SOLUTION INTRAMUSCULAR; INTRAVENOUS at 15:53

## 2020-11-28 RX ADMIN — ASPIRIN 325 MG ORAL TABLET 325 MG: 325 PILL ORAL at 11:25

## 2020-11-28 RX ADMIN — CLONIDINE HYDROCHLORIDE 0.2 MG: 0.1 TABLET ORAL at 22:35

## 2020-11-28 RX ADMIN — ATORVASTATIN CALCIUM 20 MG: 20 TABLET, FILM COATED ORAL at 22:35

## 2020-11-28 RX ADMIN — CLONIDINE HYDROCHLORIDE 0.2 MG: 0.1 TABLET ORAL at 18:09

## 2020-11-28 RX ADMIN — IPRATROPIUM BROMIDE AND ALBUTEROL SULFATE 3 ML: .5; 3 SOLUTION RESPIRATORY (INHALATION) at 07:20

## 2020-11-28 RX ADMIN — LISINOPRIL 20 MG: 20 TABLET ORAL at 16:56

## 2020-11-28 RX ADMIN — Medication 10 ML: at 22:36

## 2020-11-28 RX ADMIN — Medication 10 ML: at 15:53

## 2020-11-28 NOTE — H&P
Hospitalist Admission History and Physical    NAME:  Jennie Rivera   :   1965   MRN:   [id number]     PCP:  Edy Dooley NP  Date/Time:  2020 4:28 PM    Subjective:   CHIEF COMPLAINT:  Chest pain     HISTORY OF PRESENT ILLNESS:     Dewayne Dinh is a 63 y.o.  female who presents with complaint of substernal chest pain/pressure onset early this morning states occurring when she was walking to the bathroom. Patient proceeded to call 911 and presented to ER at approximately 3 AM.  Patient states chest pressure resolved at approximately 430 this morning. Patient's main complaints to this provider were onset of headache, shortness of breath. Patient denies fever, chills, no known sick contacts, no significant coughing episodes. Patient otherwise denies any nausea, vomiting, abdominal pain. Patient states she uses oxygen at home at 3 L nasal cannula consistently. Patient also states she has run out of several of her medications in particular she has not had her Xanax recently, her inhalers, does not have nitroglycerin at home and does not recognize Daliresp nor Carafate. She states she has been taking methadone intermittently last had methadone 65 mg, 3 days ago on 2020 and states she does not take this medication regularly but gets that at the methadone clinic in [place]. She voiced that she has not used heroin in approximately 1 month and cannot afford cigarettes at this point. She is generally disinterested in smoking cessation and states she would resume cigarettes if she could afford it.     Patient in ED received Lasix 40 mg IV x1   Solu-Medrol 125 mg IV x1  Aspirin 325 mg oral x1  DuoNeb x1    Past Medical History:   Diagnosis Date    CHF (congestive heart failure) (HCC)     Chronic respiratory failure with hypoxia ([facility] 2020    CKD (chronic kidney disease) stage 3, GFR 30-59 ml/min 10/31/2019    Cocaine abuse ([facility] [facility] 2017    COPD (chronic obstructive pulmonary disease) with chronic bronchitis ([address] 12/19/2017    Dependence on supplemental oxygen     Depression 3/22/2020    Drug-seeking behavior 11/19/2016    Endocrine disease     thyroid issues    Essential hypertension 3/10/2017    Fe deficiency anemia 10/27/2012    Fibromyalgia 12/16/2016    Gastroesophageal reflux disease without esophagitis 10/10/2016    Gastrointestinal disorder     \"blockage in my stomach\"    Hypercholesterolemia     Hypertension     Hypertensive heart failure ([address] 12/19/2017    Morbid obesity due to excess calories ([address] 3/10/2017    NSTEMI (non-ST elevated myocardial infarction) ([address] 3/22/2020    On home O2 12/3/2015    Overview:  2L at home per pt for approx 8 years    Polysubstance abuse ([address] 9/15/2018    Respiratory failure ([address] 1/11/2017    S/P hernia repair 10/31/2019    Sleep apnea 12/19/2017    T wave inversion in EKG 3/9/2019    Tobacco use 5/34/6972    Uncomplicated severe persistent asthma 12/13/2016    Vocal cord disease 12/7/2016        Past Surgical History:   Procedure Laterality Date    HX GI      Exploratory laparotomy with lysis of adhesions and primary repair of incarcerated umbilical hernia       Social History     Tobacco Use    Smoking status: Former Smoker     Packs/day: 0.25    Smokeless tobacco: Current User    Tobacco comment: Pt states she has not had money to buy cigarettes in the past month   Substance Use Topics    Alcohol use: No     Comment: socially      No family history on file. No Known Allergies     Prior to Admission Medications   Prescriptions Last Dose Informant Patient Reported? Taking? ALPRAZolam (Xanax) 0.5 mg tablet 10/28/2020 at Unknown time  No Yes   Sig: Take 1 Tab by mouth every eight (8) hours as needed for Anxiety. Max Daily Amount: 1.5 mg. OXYGEN-AIR DELIVERY SYSTEMS 11/27/2020 at Unknown time  Yes Yes   Sig: 3 L by IntraNASal route as needed for Other (sob).    QUEtiapine (SEROquel) 50 mg tablet Unknown at Unknown time  Yes No   Sig: TAKE 1 TABLET BY MOUTH AT BEDTIME FOR MOOD   albuterol (PROVENTIL HFA, VENTOLIN HFA, PROAIR HFA) 90 mcg/actuation inhaler 2020 at Unknown time  No Yes   Sig: Take 2 Puffs by inhalation every four (4) hours as needed for Wheezing. albuterol (PROVENTIL VENTOLIN) 2.5 mg /3 mL (0.083 %) nebu 2020 at Unknown time  No Yes   Sig: 3 mL by Nebulization route every four (4) hours as needed for Wheezing. albuterol (PROVENTIL VENTOLIN) 2.5 mg /3 mL (0.083 %) nebu   No No   Sig: 3 mL by Nebulization route every four (4) hours as needed for Wheezing. amLODIPine (NORVASC) 10 mg tablet   Yes Yes   Sig: Take 10 mg by mouth daily. arformoteroL (BROVANA) 15 mcg/2 mL nebu neb solution 2020 at Unknown time  No Yes   Si mL by Nebulization route two (2) times a day. Indications: bronchospasm prevention with COPD   aspirin delayed-release 81 mg tablet 2020 at Unknown time  No Yes   Sig: Take 1 Tab by mouth daily. atorvastatin (LIPITOR) 20 mg tablet 2020 at Unknown time  No Yes   Sig: Take 1 Tab by mouth nightly. budesonide (PULMICORT) 0.5 mg/2 mL nbsp 2020 at Unknown time  No Yes   Si mL by Nebulization route two (2) times a day. citalopram (CeleXA) 20 mg tablet 2020 at Unknown time  Yes Yes   Si tablet   cloNIDine HCL (CATAPRES) 0.2 mg tablet 2020 at Unknown time  Yes Yes   Sig: Take 0.2 mg by mouth three (3) times daily. docusate sodium (COLACE) 100 mg capsule 10/28/2020 at Unknown time  No Yes   Sig: Take 1 Cap by mouth two (2) times a day. Patient taking differently: Take 100 mg by mouth two (2) times daily as needed. famotidine (PEPCID) 20 mg tablet 10/28/2020 at Unknown time  No Yes   Sig: Take 1 Tab by mouth daily. Patient taking differently: Take 20 mg by mouth daily as needed.    fluticasone furoate-vilanteroL (Breo Ellipta) 200-25 mcg/dose inhaler 2020 at Unknown time  No Yes   Sig: Take 1 Puff by inhalation daily. gemfibroziL (LOPID) 600 mg tablet 2020 at Unknown time  Yes Yes   Si tablet   lisinopriL (PRINIVIL, ZESTRIL) 20 mg tablet 2020 at Unknown time  Yes Yes   Sig: Take 20 mg by mouth daily. montelukast (SINGULAIR) 10 mg tablet 2020 at Unknown time  No Yes   Sig: Take 1 Tab by mouth nightly. naloxone (NARCAN) 4 mg/actuation nasal spray Unknown at Unknown time  No No   Sig: Use 1 spray intranasally, then discard. Repeat with new spray every 2 min as needed for opioid overdose symptoms, alternating nostrils. nitroglycerin (NITROSTAT) 0.4 mg SL tablet Not Taking at Unknown time  No No   Sig: Take 1 Tab by mouth every five (5) minutes as needed for Chest Pain. Sit/Lay down then put one tab under the tongue every 5 minutes as needed for chest pain for 3 doses   Patient taking differently: Take 0.4 mg by mouth every five (5) minutes as needed for Chest Pain. Sit/Lay down then put one tab under the tongue every 5 minutes as needed for chest pain for 3 doses    Pt states she is out of this medication   predniSONE (STERAPRED DS) 10 mg dose pack   No No   Si day dose pack per package instructions   roflumilast (DALIRESP) 500 mcg tab tablet Unknown at Unknown time  No No   Sig: Take 1 Tab by mouth daily. sucralfate (CARAFATE) 100 mg/mL suspension Unknown at Unknown time  Yes No   Sig: Take 1,000 mg by mouth. Facility-Administered Medications: None     REVIEW OF SYSTEMS:     [] Unable to obtain  ROS due to  []mental status change  []sedated   []intubated   [x]Total of 12 systems reviewed as follows:    GENERAL: no fever or chills   HEENT: no sinus congestion / hearing or vision changes  NECK: No pain or stiffness.    PULMONARY: + shortness of breath; denies frequent cough  Cardiovascular: + Chest pain earlier today which is now resolved ; denies palpitations; no lower extremity edema  GASTROINTESTINAL: Denies abdominal pain, constipation, diarrhea, nausea, vomiting or melena / bloody stools  GENITOURINARY: No urinary frequency, urgency, hesitancy or dysuria. MUSCULOSKELETAL: no back / joint or muscle pain, no recent trauma. DERMATOLOGIC: denies pruritis, rashes or open areas   ENDOCRINE: No polyuria, polydipsia, no heat or cold intolerance. HEMATOLOGICAL: No anemia or easy bruising or bleeding. NEUROLOGIC:  no focal weakness,  no speech changes     Objective:   VITALS:    Visit Vitals  BP (!) 150/69   Pulse 68   Temp 98 °F (36.7 °C)   Resp 13   Wt 79.4 kg (175 lb)   LMP 2009   SpO2 100%   BMI 35.35 kg/m²     Temp (24hrs), Av °F (36.7 °C), Min:98 °F (36.7 °C), Max:98 °F (36.7 °C)    PHYSICAL EXAM:   General:          Alert, in NAD  HEENT:           Pupils equal.  Sclera anicteric. Conjunctiva pink. Mucous membranes                          Moist  Neck:               Supple. Trachea midline. Mild accessory muscle use. No jugular venous distention, no carotid bruit  CV:                  Regular rate and rhythm. S1S2+  Lungs:             +  Bilateral expiratory wheeze /able to carry on conversation appropriately and without increased work of breathing  Abdomen:        Soft, non-tender. Not distended. Bowel sounds normal.   Extremities:     No cyanosis. No edema. Pulses 2+ b/l  Neurologic:      Alert and oriented X 3. Follows commands, responds appropriately. No focal neurological deficit was noted  Skin:                Warm and dry. No rashes.      LAB DATA REVIEWED:    No components found for: [place]  Recent Labs     20  0950      K 3.7      CO2 31   BUN 14   CREA 0.93   GLU 90   CA 8.4*   ALB 3.4   WBC 5.7   HGB 11.3*   HCT 34.6*        IMAGING RESULTS:     [x]  I have personally reviewed the actual   [x]CXR  []CT scan    Assessment/Plan:      Active Problems:    Acute bronchitis with chronic obstructive pulmonary disease (COPD) ([facility] (2020)       ___________________________________________________  PLAN: 1. Acute COPD exacerbation -start IV steroids along with SSI /scheduled and as needed nebulizers /patient states she will need a new nebulizer on time of discharge  2. Chest pain atypical -troponins negative x2 /EKG without concern of acute cardiac injury   3. Acute shortness of breath - likely in setting of #1 /potential interstitial edema per chest x-ray s/p Lasix x1 in ED will hold on further diuresis  /recent echocardiogram on 10/19/2020 essentially normal  4. QT prolongation -avoid rate prolonging agents / check EKG in a.m.  5. History of polysubstance abuse - check UDS / patient reports intermittently taking methadone last dose on 11/25 / patient reports last heroin use approximately a month ago  6. Hypertension -resume clonidine and ACE inhibitor /patient states she ran out of several medications in home setting including her Norvasc prior to admission for ? A couple weeks /consider resumption if bp elevated  7.  Tobacco abuse -patient reports not having finances for cigarettes for  this month /disinterested in cessation at this time    CODE STATUS discussed with patient she continues to wish for full code    Risk of deterioration:  []Low    [x]Moderate  []High    Prophylaxis:  [x]Lovenox  []Coumadin  []Hep SQ  []SCDs  []H2B/PPI    Disposition:  []Home w/ Family   [x]HH PT,OT,RN   []SNF/LTC   []SAH/Rehab    Discussed Code Status:    [x]Full Code      []DNR     ___________________________________________________    Care Plan discussed with:    [x]Patient   []Family    []ED Care Manager  []ED Doc   []Specialist :  ___________________________________________________  Admitting Provider: Harrison Berumen NP

## 2020-11-28 NOTE — ED PROVIDER NOTES
EMERGENCY DEPARTMENT HISTORY AND PHYSICAL EXAM  This was created with voice recognition software and transcription errors may be present. 7:51 AM  Date: 11/28/2020  Patient Name: June Jimenez    History of Presenting Illness     Chief Complaint:    History Provided By:     HPI: June Jimenez is a 63 y.o. female with a past medical history of CHF COPD CKD cocaine abuse depression hypertension fibromyalgia high cholesterol coronary disease polysubstance abuse and respiratory failure who presents with shortness of breath and chest pain. Patient states the chest pain and shortness of breath started a few hours ago lasted about 10 minutes has recurred a few times no associated nausea vomiting or diaphoresis no fevers no chills. Is associated with bilateral expiratory wheezing. No known sick contact exposure.     PCP: Joss Grewal NP      Past History     Past Medical History:  Past Medical History:   Diagnosis Date    CHF (congestive heart failure) ([address]     Chronic respiratory failure with hypoxia ([address] 9/7/2020    CKD (chronic kidney disease) stage 3, GFR 30-59 ml/min 10/31/2019    Cocaine abuse ([address] 11/26/2017    COPD (chronic obstructive pulmonary disease) with chronic bronchitis ([address] 12/19/2017    Dependence on supplemental oxygen     Depression 3/22/2020    Drug-seeking behavior 11/19/2016    Endocrine disease     thyroid issues    Essential hypertension 3/10/2017    Fe deficiency anemia 10/27/2012    Fibromyalgia 12/16/2016    Gastroesophageal reflux disease without esophagitis 10/10/2016    Gastrointestinal disorder     \"blockage in my stomach\"    Hypercholesterolemia     Hypertension     Hypertensive heart failure ([address] 12/19/2017    Morbid obesity due to excess calories ([address] 3/10/2017    NSTEMI (non-ST elevated myocardial infarction) ([address] 3/22/2020    On home O2 12/3/2015    Overview:  2L at home per pt for approx 8 years    Polysubstance abuse ([address] 9/15/2018    Respiratory failure ([facility] 1/11/2017    S/P hernia repair 10/31/2019    Sleep apnea 12/19/2017    T wave inversion in EKG 3/9/2019    Tobacco use 9/23/3565    Uncomplicated severe persistent asthma 12/13/2016    Vocal cord disease 12/7/2016       Past Surgical History:  Past Surgical History:   Procedure Laterality Date    HX GI      Exploratory laparotomy with lysis of adhesions and primary repair of incarcerated umbilical hernia       Family History:  No family history on file. Social History:  Social History     Tobacco Use    Smoking status: Current Every Day Smoker     Packs/day: 0.25    Smokeless tobacco: Current User   Substance Use Topics    Alcohol use: No     Comment: socially    Drug use: Not Currently     Types: Heroin       Allergies:  No Known Allergies    Review of Systems     Review of Systems   Constitutional: Negative for chills and fever. Respiratory: Positive for chest tightness. Cardiovascular: Positive for chest pain. All other systems reviewed and are negative. 10 point review of systems otherwise negative unless noted in HPI. Physical Exam       Physical Exam  Constitutional:       Appearance: She is well-developed. HENT:      Head: Normocephalic and atraumatic. Eyes:      Pupils: Pupils are equal, round, and reactive to light. Neck:      Musculoskeletal: Normal range of motion and neck supple. Cardiovascular:      Rate and Rhythm: Normal rate and regular rhythm. Heart sounds: Normal heart sounds. No murmur. No friction rub. Pulmonary:      Effort: Pulmonary effort is normal. No respiratory distress. Breath sounds: Normal breath sounds. No wheezing. Abdominal:      General: There is no distension. Palpations: Abdomen is soft. Tenderness: There is no abdominal tenderness. There is no guarding or rebound. Musculoskeletal: Normal range of motion. Skin:     General: Skin is warm and dry.    Neurological:      Mental Status: She is alert and oriented to person, place, and time. Psychiatric:         Behavior: Behavior normal.         Thought Content: Thought content normal.         Diagnostic Study Results     Vital Signs  EKG: Sinus at 62 normal axis normal intervals there is no ST elevation or depression and no hypertrophy TCA for anyone  Labs: CBC chemistry unremarkable troponin negative x2  Imaging:   IMPRESSION:     1.  Subtle peripheral septal lines thickening with interval development of mild  cardiac silhouette prominence. Potentially suggestive of slight interstitial  edema from cardiogenic causes or from increased intravascular fluid volume.     2. No definite airspace opacities. Medical Decision Making     ED Course: Progress Notes, Reevaluation, and Consults:      Provider Notes (Medical Decision Making): Ms. Petros Balderas presents with shortness of breath and chest pain. She is very well-known to me from multiple prior admissions. She has very severe COPD and has been given albuterol now site Medrol admit by me. Will check troponin by 2 observe in the emergency department. X-ray noted. Unclear if this is CHF or COPD. I suspect COPD based on the wheezing will give her a dose of Lasix and on think she is to be admitted will advise follow-up outpatient    Patient very short of breath and significantly short of breath on exertion with admit to the hospitalist service. On think she is antibiotics and on think this is secondary to infection this is actually very typical of her COPD exacerbations and she is very well-known to me and other staff here. This is very typical I do not think she has Covid on that she has another infection I do see the x-ray I think potential is a small component this is primarily COPD exacerbation based on her lung sounds of wheezing. Diagnosis     Clinical Impression: No diagnosis found.     Disposition:    Patient's Medications   Start Taking    No medications on file   Continue Taking ALBUTEROL (PROVENTIL HFA, VENTOLIN HFA, PROAIR HFA) 90 MCG/ACTUATION INHALER    Take 2 Puffs by inhalation every four (4) hours as needed for Wheezing. ALBUTEROL (PROVENTIL HFA, VENTOLIN HFA, PROAIR HFA) 90 MCG/ACTUATION INHALER    Take 2 Puffs by inhalation every four (4) hours as needed for Wheezing. ALBUTEROL (PROVENTIL VENTOLIN) 2.5 MG /3 ML (0.083 %) NEBU    3 mL by Nebulization route every four (4) hours as needed for Wheezing. ALBUTEROL (PROVENTIL VENTOLIN) 2.5 MG /3 ML (0.083 %) NEBU    3 mL by Nebulization route every four (4) hours as needed for Wheezing. ALBUTEROL-IPRATROPIUM (DUO-NEB) 2.5 MG-0.5 MG/3 ML NEBU    3 mL by Nebulization route every four (4) hours as needed for Wheezing, Shortness of Breath or Cough. ALPRAZOLAM (XANAX) 0.5 MG TABLET    Take 1 Tab by mouth every eight (8) hours as needed for Anxiety. Max Daily Amount: 1.5 mg. ARFORMOTEROL (BROVANA) 15 MCG/2 ML NEBU NEB SOLUTION    2 mL by Nebulization route two (2) times a day. Indications: bronchospasm prevention with COPD    ASPIRIN DELAYED-RELEASE 81 MG TABLET    Take 1 Tab by mouth daily. ATORVASTATIN (LIPITOR) 20 MG TABLET    Take 1 Tab by mouth nightly. BUDESONIDE (PULMICORT) 0.5 MG/2 ML NBSP    2 mL by Nebulization route two (2) times a day. CITALOPRAM (CELEXA) 20 MG TABLET    1 tablet    DOCUSATE SODIUM (COLACE) 100 MG CAPSULE    Take 1 Cap by mouth two (2) times a day. FAMOTIDINE (PEPCID) 20 MG TABLET    Take 1 Tab by mouth daily. FLUTICASONE FUROATE-VILANTEROL (BREO ELLIPTA) 200-25 MCG/DOSE INHALER    Take 1 Puff by inhalation daily. GEMFIBROZIL (LOPID) 600 MG TABLET    1 tablet    MONTELUKAST (SINGULAIR) 10 MG TABLET    Take 1 Tab by mouth nightly. NALOXONE (NARCAN) 4 MG/ACTUATION NASAL SPRAY    Use 1 spray intranasally, then discard. Repeat with new spray every 2 min as needed for opioid overdose symptoms, alternating nostrils.     NITROGLYCERIN (NITROSTAT) 0.4 MG SL TABLET    Take 1 Tab by mouth every five (5) minutes as needed for Chest Pain. Sit/Lay down then put one tab under the tongue every 5 minutes as needed for chest pain for 3 doses    OXYGEN-AIR DELIVERY SYSTEMS    3 L by IntraNASal route as needed for Other (sob). PREDNISONE (STERAPRED DS) 10 MG DOSE PACK    6 day dose pack per package instructions    QUETIAPINE (SEROQUEL) 50 MG TABLET    TAKE 1 TABLET BY MOUTH AT BEDTIME FOR MOOD    ROFLUMILAST (DALIRESP) 500 MCG TAB TABLET    Take 1 Tab by mouth daily. SUCRALFATE (CARAFATE) 100 MG/ML SUSPENSION    Take 1,000 mg by mouth.    These Medications have changed    No medications on file   Stop Taking    No medications on file

## 2020-11-29 ENCOUNTER — APPOINTMENT (OUTPATIENT)
Dept: VASCULAR SURGERY | Age: 55
End: 2020-11-29
Attending: INTERNAL MEDICINE
Payer: MEDICAID

## 2020-11-29 LAB
ATRIAL RATE: 59 BPM
CALCULATED P AXIS, ECG09: 73 DEGREES
CALCULATED R AXIS, ECG10: 50 DEGREES
CALCULATED T AXIS, ECG11: 62 DEGREES
DIAGNOSIS, 93000: NORMAL
GLUCOSE BLD STRIP.AUTO-MCNC: 159 MG/DL (ref 70–110)
P-R INTERVAL, ECG05: 138 MS
Q-T INTERVAL, ECG07: 420 MS
QRS DURATION, ECG06: 84 MS
QTC CALCULATION (BEZET), ECG08: 415 MS
VENTRICULAR RATE, ECG03: 59 BPM

## 2020-11-29 PROCEDURE — 74011250637 HC RX REV CODE- 250/637: Performed by: INTERNAL MEDICINE

## 2020-11-29 PROCEDURE — 94640 AIRWAY INHALATION TREATMENT: CPT

## 2020-11-29 PROCEDURE — 99233 SBSQ HOSP IP/OBS HIGH 50: CPT | Performed by: INTERNAL MEDICINE

## 2020-11-29 PROCEDURE — 74011000250 HC RX REV CODE- 250: Performed by: NURSE PRACTITIONER

## 2020-11-29 PROCEDURE — 99218 HC RM OBSERVATION: CPT

## 2020-11-29 PROCEDURE — 93970 EXTREMITY STUDY: CPT

## 2020-11-29 PROCEDURE — 82962 GLUCOSE BLOOD TEST: CPT

## 2020-11-29 PROCEDURE — 93005 ELECTROCARDIOGRAM TRACING: CPT

## 2020-11-29 PROCEDURE — 74011636637 HC RX REV CODE- 636/637: Performed by: INTERNAL MEDICINE

## 2020-11-29 PROCEDURE — 74011250637 HC RX REV CODE- 250/637: Performed by: NURSE PRACTITIONER

## 2020-11-29 PROCEDURE — 65660000000 HC RM CCU STEPDOWN

## 2020-11-29 PROCEDURE — 74011250637 HC RX REV CODE- 250/637: Performed by: EMERGENCY MEDICINE

## 2020-11-29 PROCEDURE — 94761 N-INVAS EAR/PLS OXIMETRY MLT: CPT

## 2020-11-29 PROCEDURE — 77030038269 HC DRN EXT URIN PURWCK BARD -A

## 2020-11-29 PROCEDURE — 2709999900 HC NON-CHARGEABLE SUPPLY

## 2020-11-29 RX ORDER — GABAPENTIN 100 MG/1
200 CAPSULE ORAL 3 TIMES DAILY
Status: DISCONTINUED | OUTPATIENT
Start: 2020-11-29 | End: 2020-12-01 | Stop reason: HOSPADM

## 2020-11-29 RX ORDER — AMLODIPINE BESYLATE 10 MG/1
10 TABLET ORAL
Status: DISCONTINUED | OUTPATIENT
Start: 2020-11-29 | End: 2020-12-01 | Stop reason: HOSPADM

## 2020-11-29 RX ORDER — FAMOTIDINE 20 MG/1
20 TABLET, FILM COATED ORAL 2 TIMES DAILY
Status: DISCONTINUED | OUTPATIENT
Start: 2020-11-29 | End: 2020-12-01 | Stop reason: HOSPADM

## 2020-11-29 RX ORDER — ALPRAZOLAM 0.5 MG/1
0.5 TABLET ORAL
Status: DISCONTINUED | OUTPATIENT
Start: 2020-11-29 | End: 2020-12-01 | Stop reason: HOSPADM

## 2020-11-29 RX ORDER — DOXYCYCLINE 100 MG/1
100 CAPSULE ORAL EVERY 12 HOURS
Status: DISCONTINUED | OUTPATIENT
Start: 2020-11-29 | End: 2020-12-01 | Stop reason: HOSPADM

## 2020-11-29 RX ORDER — MONTELUKAST SODIUM 10 MG/1
10 TABLET ORAL
Status: DISCONTINUED | OUTPATIENT
Start: 2020-11-29 | End: 2020-12-01 | Stop reason: HOSPADM

## 2020-11-29 RX ORDER — GABAPENTIN 300 MG/1
300 CAPSULE ORAL ONCE
Status: COMPLETED | OUTPATIENT
Start: 2020-11-29 | End: 2020-11-29

## 2020-11-29 RX ORDER — BUDESONIDE 0.5 MG/2ML
500 INHALANT ORAL
Status: DISCONTINUED | OUTPATIENT
Start: 2020-11-29 | End: 2020-12-01 | Stop reason: HOSPADM

## 2020-11-29 RX ORDER — ARFORMOTEROL TARTRATE 15 UG/2ML
15 SOLUTION RESPIRATORY (INHALATION)
Status: DISCONTINUED | OUTPATIENT
Start: 2020-11-29 | End: 2020-12-01 | Stop reason: HOSPADM

## 2020-11-29 RX ADMIN — CLONIDINE HYDROCHLORIDE 0.2 MG: 0.1 TABLET ORAL at 21:36

## 2020-11-29 RX ADMIN — AMLODIPINE BESYLATE 10 MG: 10 TABLET ORAL at 17:31

## 2020-11-29 RX ADMIN — IPRATROPIUM BROMIDE AND ALBUTEROL SULFATE 3 ML: .5; 3 SOLUTION RESPIRATORY (INHALATION) at 03:02

## 2020-11-29 RX ADMIN — DOXYCYCLINE HYCLATE 100 MG: 100 CAPSULE ORAL at 12:25

## 2020-11-29 RX ADMIN — DOXYCYCLINE HYCLATE 100 MG: 100 CAPSULE ORAL at 21:36

## 2020-11-29 RX ADMIN — FAMOTIDINE 20 MG: 20 TABLET, FILM COATED ORAL at 17:31

## 2020-11-29 RX ADMIN — ASPIRIN 325 MG ORAL TABLET 325 MG: 325 PILL ORAL at 09:40

## 2020-11-29 RX ADMIN — PREDNISONE 50 MG: 20 TABLET ORAL at 12:26

## 2020-11-29 RX ADMIN — GABAPENTIN 200 MG: 100 CAPSULE ORAL at 21:41

## 2020-11-29 RX ADMIN — Medication 10 ML: at 21:43

## 2020-11-29 RX ADMIN — CLONIDINE HYDROCHLORIDE 0.2 MG: 0.1 TABLET ORAL at 17:31

## 2020-11-29 RX ADMIN — ALPRAZOLAM 0.5 MG: 0.5 TABLET ORAL at 16:33

## 2020-11-29 RX ADMIN — Medication 10 ML: at 05:59

## 2020-11-29 RX ADMIN — LISINOPRIL 20 MG: 20 TABLET ORAL at 09:40

## 2020-11-29 RX ADMIN — IPRATROPIUM BROMIDE AND ALBUTEROL SULFATE 3 ML: .5; 3 SOLUTION RESPIRATORY (INHALATION) at 16:25

## 2020-11-29 RX ADMIN — GABAPENTIN 300 MG: 300 CAPSULE ORAL at 12:26

## 2020-11-29 RX ADMIN — MONTELUKAST 10 MG: 10 TABLET, FILM COATED ORAL at 21:41

## 2020-11-29 RX ADMIN — CLONIDINE HYDROCHLORIDE 0.2 MG: 0.1 TABLET ORAL at 09:40

## 2020-11-29 NOTE — PROGRESS NOTES
Hospitalist Progress Note    Patient: Job Smith Age: 63 y.o. : 1965 MR#: [id number] SSN: [id number]  Date/Time: 2020 11:04 AM    DOA: 2020  PCP: Zachary Gooden NP    Subjective:     Pulled out her IV access today and refused IV medications. She complained of leg pain bilaterally, she stated that she has \"sciatica\"   Still with coughing and wheezing. No fever. Interval Hospital Course:    ROS: No current fever/chills, no headache, no dizziness, no facial pain, no sinus congestion,   No swallowing pain, No chest pain, no palpitation, + shortness of breath, no abd pain,  No diarrhea, no urinary complaint, no leg pain or swelling      Assessment/Plan:   1. COPD with acute exacerbation with acute bronchitis   2. Acute hypercapnia, likely from copd exacerbation, she has chronic hypoxia with intermittent oxygen use at home   3. Atypical chest pain   4. Tobacco dependence   5. HTN  6. Depression/anxiety        Fibromyalgia   7. Hyperlipidemia   8. GERD   9. Leg pain, not swelling, normal range CPK     Add Doxycycline BID, add budesonide/arformoterol, duoneb. Switch off solumedrol because of no line, she can take prednisone   Cont oxygen supplement.    Add pepcid  cont cardiac medication   Can hold her statin and femfibrozil as this can cause myalgia, check CPK tomorrow   Duplex of leg   Give gabapentin     Full code      Additional Notes:    Time spent >30 minutes    Case discussed with:  [x]Patient  []Family  [x]Nursing  [x]Case Management  DVT Prophylaxis:  [x]Lovenox  []Hep SQ  []SCDs  []Coumadin   []On Heparin gtt    Signed By: Moises Salamanca MD     2020 11:04 AM              Objective:   VS:   Visit Vitals  BP (!) 187/82   Pulse 65   Temp 98.1 °F (36.7 °C)   Resp 20   Wt 79.4 kg (175 lb)   LMP 2009   SpO2 95%   BMI 35.35 kg/m²      Tmax/24hrs: Temp (24hrs), Av °F (36.7 °C), Min:97.6 °F (36.4 °C), Max:98.4 °F (36.9 °C)  No intake or output data in the 24 hours ending 11/29/20 1104    Tele:   General:  Cooperative, Not in acute distress, speaks in full sentence while in bed  HEENT: PERRL, EOMI, supple neck, no JVD, dry oral mucosa  Cardiovascular: S1S2 regular, no rub/gallop   Pulmonary: Clear air entry bilaterally, + wheezing, no crackle  GI:  Soft, non tender, non distended, +bs, no guarding   Extremities:  No pedal edema, +distal pulses appreciated   Neuro: AOx3, moving all extremities, no gross deficit.    Additional:       Current Facility-Administered Medications   Medication Dose Route Frequency    famotidine (PEPCID) tablet 20 mg  20 mg Oral BID    budesonide (PULMICORT) 500 mcg/2 ml nebulizer suspension  500 mcg Nebulization BID RT    arformoteroL (BROVANA) neb solution 15 mcg  15 mcg Nebulization BID RT    doxycycline (VIBRAMYCIN) capsule 100 mg  100 mg Oral Q12H    aspirin tablet 325 mg  325 mg Oral DAILY    atorvastatin (LIPITOR) tablet 20 mg  20 mg Oral QHS    cloNIDine HCL (CATAPRES) tablet 0.2 mg  0.2 mg Oral TID    gemfibroziL (LOPID) tablet 600 mg  600 mg Oral ACB&D    lisinopriL (PRINIVIL, ZESTRIL) tablet 20 mg  20 mg Oral DAILY    sodium chloride (NS) flush 5-40 mL  5-40 mL IntraVENous Q8H    sodium chloride (NS) flush 5-40 mL  5-40 mL IntraVENous PRN    acetaminophen (TYLENOL) tablet 650 mg  650 mg Oral Q6H PRN    Or    acetaminophen (TYLENOL) suppository 650 mg  650 mg Rectal Q6H PRN    polyethylene glycol (MIRALAX) packet 17 g  17 g Oral DAILY PRN    promethazine (PHENERGAN) tablet 12.5 mg  12.5 mg Oral Q6H PRN    enoxaparin (LOVENOX) injection 40 mg  40 mg SubCUTAneous DAILY    methylPREDNISolone (PF) (SOLU-MEDROL) injection 40 mg  40 mg IntraVENous Q8H    albuterol-ipratropium (DUO-NEB) 2.5 MG-0.5 MG/3 ML  3 mL Nebulization Q2H PRN    glucose chewable tablet 16 g  4 Tab Oral PRN    glucagon (GLUCAGEN) injection 1 mg  1 mg IntraMUSCular PRN    dextrose (D50W) injection syrg 12.5-25 g  25-50 mL IntraVENous PRN    insulin lispro (HUMALOG) injection   SubCUTAneous AC&HS    albuterol-ipratropium (DUO-NEB) 2.5 MG-0.5 MG/3 ML  3 mL Nebulization Q4H RT            Lab/Data Review:  Labs: Results:       Chemistry Recent Labs     11/28/20  0950   GLU 90      K 3.7      CO2 31   BUN 14   CREA 0.93   BUCR 15   AGAP 3   CA 8.4*     Recent Labs     11/28/20  0950   ALT 20   TP 6.4   ALB 3.4   GLOB 3.0   AGRAT 1.1      CBC w/Diff Recent Labs     11/28/20  0950   WBC 5.7   RBC 3.83*   HGB 11.3*   HCT 34.6*   MCV 90.3   MCH 29.5   MCHC 32.7   RDW 12.8      GRANS 58   LYMPH 32   EOS 3      Coagulation No results for input(s): PTP, INR, APTT, INREXT in the last 72 hours. Iron/Ferritin Lab Results   Component Value Date/Time    Ferritin 41 09/09/2020 05:09 AM       BNP    Cardiac Enzymes Lab Results   Component Value Date/Time     11/28/2020 11:32 AM    CK - MB 2.1 11/28/2020 11:32 AM    CK-MB Index 1.4 11/28/2020 11:32 AM    Troponin-I <0.015 12/07/2015 05:23 PM    Troponin-I, QT <0.02 11/28/2020 11:32 AM    BNP 19.9 12/07/2015 05:23 PM        Lactic Acid    Thyroid Studies          All Micro Results     None            Images:    CT (Most Recent). XRAY (Most Recent) XR Results (most recent):  Results from Hospital Encounter encounter on 11/28/20   XR CHEST PORT    Narrative EXAM:  Chest Portable. INDICATION:  Chest pain, shortness of breath, wheezing     COMPARISON:  11/16/20     TECHNIQUE:  Portable AP chest study    FINDINGS:      - Slight prominence of the cardiac silhouette. - Subtle peripheral septal lines thickening in the left mid to lower lung zones. - No definite airspace opacities, pleural effusion or pneumothorax. Impression IMPRESSION:    1. Subtle peripheral septal lines thickening with interval development of mild  cardiac silhouette prominence. Potentially suggestive of slight interstitial  edema from cardiogenic causes or from increased intravascular fluid volume.     2.  No definite airspace opacities. EKG No results found for this or any previous visit.      2D ECHO

## 2020-11-29 NOTE — PROGRESS NOTES
Patient is noncompliant. Dr Sara Senior made aware. Refused all POC Glucose checks. Refused morning breathing treatment. Patient reports pain in her leg. Administered Gabapentin. Patient is AXOx4. Resting in bed. On a cardiac diet. Call bell within reach. Bed locked in lowest position. Problem: Falls - Risk of  Goal: *Absence of Falls  Description: Document Carlin Pepper Fall Risk and appropriate interventions in the flowsheet.   11/29/2020 1518 by Javed Cole  Outcome: Not Progressing Towards Goal  Note: Fall Risk Interventions:  Mobility Interventions: Bed/chair exit alarm              Elimination Interventions: Call light in reach, Bed/chair exit alarm           11/29/2020 1515 by Javed Cole  Outcome: Progressing Towards Goal  Note: Fall Risk Interventions:  Mobility Interventions: Bed/chair exit alarm              Elimination Interventions: Call light in reach, Bed/chair exit alarm              Problem: Patient Education: Go to Patient Education Activity  Goal: Patient/Family Education  11/29/2020 1518 by Javed Cole  Outcome: Not Progressing Towards Goal  11/29/2020 1515 by Javed Cole  Outcome: Progressing Towards Goal

## 2020-11-29 NOTE — PROGRESS NOTES
Bedside shift change report given to Edu Gallegos (oncoming nurse) by Andreina Gauthier RN (offgoing nurse). Report included the following information SBAR, Kardex, Intake/Output, MAR and Recent Results.

## 2020-11-29 NOTE — ROUTINE PROCESS
Pt requesting pain medicine for bilat LE and xXanax for anxiety. This nurse reiterated POC. Pt requesting phone call to  [facility]. Dr. Zhou Villeda paged.

## 2020-11-29 NOTE — ROUTINE PROCESS
Attempted to perform bilateral lower extremity duplex exam.  Patient began hollering and cussing with minimal pressure on her right leg. She states that she cannot even wash her legs without severe pain. Called the patient's RN on the floor and discussed this issue. We decided that she would return to the floor at this time.

## 2020-11-29 NOTE — PROGRESS NOTES
Pt requested a resp treatment. Pt angry because tech did not come right away. Responded with some expletives. Asked pt to have her iv restarted. Stated no because she was a hard stick. Notified Dr Wilson of pt`s behavior.

## 2020-11-29 NOTE — PROGRESS NOTES
Problem: Falls - Risk of  Goal: *Absence of Falls  Description: Document Lizett Carrillo Fall Risk and appropriate interventions in the flowsheet.   Outcome: Progressing Towards Goal  Note: Fall Risk Interventions:                 Elimination Interventions: Call light in reach              Problem: Patient Education: Go to Patient Education Activity  Goal: Patient/Family Education  Outcome: Progressing Towards Goal

## 2020-11-30 LAB
ANION GAP SERPL CALC-SCNC: 1 MMOL/L (ref 3–18)
BUN SERPL-MCNC: 17 MG/DL (ref 7–18)
BUN/CREAT SERPL: 18 (ref 12–20)
CALCIUM SERPL-MCNC: 8.7 MG/DL (ref 8.5–10.1)
CHLORIDE SERPL-SCNC: 110 MMOL/L (ref 100–111)
CK SERPL-CCNC: 34 U/L (ref 26–192)
CO2 SERPL-SCNC: 29 MMOL/L (ref 21–32)
CREAT SERPL-MCNC: 0.92 MG/DL (ref 0.6–1.3)
ERYTHROCYTE [DISTWIDTH] IN BLOOD BY AUTOMATED COUNT: 13.3 % (ref 11.6–14.5)
GLUCOSE BLD STRIP.AUTO-MCNC: 118 MG/DL (ref 70–110)
GLUCOSE BLD STRIP.AUTO-MCNC: 122 MG/DL (ref 70–110)
GLUCOSE BLD STRIP.AUTO-MCNC: 145 MG/DL (ref 70–110)
GLUCOSE BLD STRIP.AUTO-MCNC: 207 MG/DL (ref 70–110)
GLUCOSE SERPL-MCNC: 99 MG/DL (ref 74–99)
HCT VFR BLD AUTO: 37.7 % (ref 35–45)
HGB BLD-MCNC: 11.9 G/DL (ref 12–16)
MCH RBC QN AUTO: 28.9 PG (ref 24–34)
MCHC RBC AUTO-ENTMCNC: 31.6 G/DL (ref 31–37)
MCV RBC AUTO: 91.5 FL (ref 74–97)
PLATELET # BLD AUTO: 197 K/UL (ref 135–420)
PMV BLD AUTO: 11.5 FL (ref 9.2–11.8)
POTASSIUM SERPL-SCNC: 4 MMOL/L (ref 3.5–5.5)
RBC # BLD AUTO: 4.12 M/UL (ref 4.2–5.3)
SODIUM SERPL-SCNC: 140 MMOL/L (ref 136–145)
WBC # BLD AUTO: 10.4 K/UL (ref 4.6–13.2)

## 2020-11-30 PROCEDURE — 82962 GLUCOSE BLOOD TEST: CPT

## 2020-11-30 PROCEDURE — 2709999900 HC NON-CHARGEABLE SUPPLY

## 2020-11-30 PROCEDURE — 77030038269 HC DRN EXT URIN PURWCK BARD -A

## 2020-11-30 PROCEDURE — 99232 SBSQ HOSP IP/OBS MODERATE 35: CPT | Performed by: INTERNAL MEDICINE

## 2020-11-30 PROCEDURE — 85027 COMPLETE CBC AUTOMATED: CPT

## 2020-11-30 PROCEDURE — 74011250637 HC RX REV CODE- 250/637: Performed by: EMERGENCY MEDICINE

## 2020-11-30 PROCEDURE — 65660000000 HC RM CCU STEPDOWN

## 2020-11-30 PROCEDURE — 74011636637 HC RX REV CODE- 636/637: Performed by: INTERNAL MEDICINE

## 2020-11-30 PROCEDURE — 74011250637 HC RX REV CODE- 250/637: Performed by: NURSE PRACTITIONER

## 2020-11-30 PROCEDURE — 36415 COLL VENOUS BLD VENIPUNCTURE: CPT

## 2020-11-30 PROCEDURE — 74011636637 HC RX REV CODE- 636/637: Performed by: NURSE PRACTITIONER

## 2020-11-30 PROCEDURE — 99218 HC RM OBSERVATION: CPT

## 2020-11-30 PROCEDURE — 97165 OT EVAL LOW COMPLEX 30 MIN: CPT

## 2020-11-30 PROCEDURE — 80048 BASIC METABOLIC PNL TOTAL CA: CPT

## 2020-11-30 PROCEDURE — 82550 ASSAY OF CK (CPK): CPT

## 2020-11-30 PROCEDURE — 74011250637 HC RX REV CODE- 250/637: Performed by: INTERNAL MEDICINE

## 2020-11-30 PROCEDURE — 97161 PT EVAL LOW COMPLEX 20 MIN: CPT

## 2020-11-30 PROCEDURE — 74011000250 HC RX REV CODE- 250: Performed by: NURSE PRACTITIONER

## 2020-11-30 PROCEDURE — 94640 AIRWAY INHALATION TREATMENT: CPT

## 2020-11-30 PROCEDURE — 74011000250 HC RX REV CODE- 250: Performed by: INTERNAL MEDICINE

## 2020-11-30 RX ORDER — GABAPENTIN 100 MG/1
200 CAPSULE ORAL 3 TIMES DAILY
Qty: 60 CAP | Refills: 0 | Status: SHIPPED | OUTPATIENT
Start: 2020-11-30 | End: 2020-12-10

## 2020-11-30 RX ORDER — CITALOPRAM 20 MG/1
20 TABLET, FILM COATED ORAL
Qty: 30 TAB | Refills: 0 | Status: SHIPPED | OUTPATIENT
Start: 2020-11-30

## 2020-11-30 RX ORDER — FLUTICASONE FUROATE AND VILANTEROL TRIFENATATE 200; 25 UG/1; UG/1
1 POWDER RESPIRATORY (INHALATION) DAILY
Qty: 1 INHALER | Refills: 0 | Status: ON HOLD | OUTPATIENT
Start: 2020-11-30 | End: 2020-12-25 | Stop reason: SDUPTHER

## 2020-11-30 RX ORDER — PREDNISONE 50 MG/1
50 TABLET ORAL DAILY
Qty: 7 TAB | Refills: 0 | Status: ON HOLD | OUTPATIENT
Start: 2020-11-30 | End: 2020-12-16 | Stop reason: SDUPTHER

## 2020-11-30 RX ORDER — ALBUTEROL SULFATE 0.83 MG/ML
2.5 SOLUTION RESPIRATORY (INHALATION)
Qty: 30 NEBULE | Refills: 0 | Status: SHIPPED | OUTPATIENT
Start: 2020-11-30 | End: 2020-12-18

## 2020-11-30 RX ORDER — MONTELUKAST SODIUM 10 MG/1
10 TABLET ORAL
Qty: 30 TAB | Refills: 1 | Status: ON HOLD | OUTPATIENT
Start: 2020-11-30 | End: 2020-12-25 | Stop reason: SDUPTHER

## 2020-11-30 RX ORDER — DOXYCYCLINE 100 MG/1
100 CAPSULE ORAL EVERY 12 HOURS
Qty: 20 CAP | Refills: 0 | Status: SHIPPED | OUTPATIENT
Start: 2020-11-30 | End: 2020-12-10

## 2020-11-30 RX ADMIN — GABAPENTIN 200 MG: 100 CAPSULE ORAL at 17:17

## 2020-11-30 RX ADMIN — ARFORMOTEROL TARTRATE 15 MCG: 15 SOLUTION RESPIRATORY (INHALATION) at 08:45

## 2020-11-30 RX ADMIN — INSULIN LISPRO 4 UNITS: 100 INJECTION, SOLUTION INTRAVENOUS; SUBCUTANEOUS at 22:01

## 2020-11-30 RX ADMIN — IPRATROPIUM BROMIDE AND ALBUTEROL SULFATE 3 ML: .5; 3 SOLUTION RESPIRATORY (INHALATION) at 08:46

## 2020-11-30 RX ADMIN — FAMOTIDINE 20 MG: 20 TABLET, FILM COATED ORAL at 17:17

## 2020-11-30 RX ADMIN — BUDESONIDE 500 MCG: 0.5 SUSPENSION RESPIRATORY (INHALATION) at 08:45

## 2020-11-30 RX ADMIN — ALPRAZOLAM 0.5 MG: 0.5 TABLET ORAL at 17:17

## 2020-11-30 RX ADMIN — ASPIRIN 325 MG ORAL TABLET 325 MG: 325 PILL ORAL at 09:14

## 2020-11-30 RX ADMIN — Medication 10 ML: at 15:50

## 2020-11-30 RX ADMIN — CLONIDINE HYDROCHLORIDE 0.2 MG: 0.1 TABLET ORAL at 09:11

## 2020-11-30 RX ADMIN — MONTELUKAST 10 MG: 10 TABLET, FILM COATED ORAL at 21:40

## 2020-11-30 RX ADMIN — ARFORMOTEROL TARTRATE 15 MCG: 15 SOLUTION RESPIRATORY (INHALATION) at 19:13

## 2020-11-30 RX ADMIN — AMLODIPINE BESYLATE 10 MG: 10 TABLET ORAL at 21:40

## 2020-11-30 RX ADMIN — CLONIDINE HYDROCHLORIDE 0.2 MG: 0.1 TABLET ORAL at 17:17

## 2020-11-30 RX ADMIN — ALPRAZOLAM 0.5 MG: 0.5 TABLET ORAL at 09:12

## 2020-11-30 RX ADMIN — GABAPENTIN 200 MG: 100 CAPSULE ORAL at 09:12

## 2020-11-30 RX ADMIN — GABAPENTIN 200 MG: 100 CAPSULE ORAL at 21:41

## 2020-11-30 RX ADMIN — DOXYCYCLINE HYCLATE 100 MG: 100 CAPSULE ORAL at 21:41

## 2020-11-30 RX ADMIN — BUDESONIDE 500 MCG: 0.5 SUSPENSION RESPIRATORY (INHALATION) at 19:13

## 2020-11-30 RX ADMIN — IPRATROPIUM BROMIDE AND ALBUTEROL SULFATE 3 ML: .5; 3 SOLUTION RESPIRATORY (INHALATION) at 19:13

## 2020-11-30 RX ADMIN — CLONIDINE HYDROCHLORIDE 0.2 MG: 0.1 TABLET ORAL at 21:40

## 2020-11-30 RX ADMIN — DOXYCYCLINE HYCLATE 100 MG: 100 CAPSULE ORAL at 09:12

## 2020-11-30 RX ADMIN — IPRATROPIUM BROMIDE AND ALBUTEROL SULFATE 3 ML: .5; 3 SOLUTION RESPIRATORY (INHALATION) at 13:12

## 2020-11-30 RX ADMIN — FAMOTIDINE 20 MG: 20 TABLET, FILM COATED ORAL at 09:14

## 2020-11-30 RX ADMIN — PREDNISONE 50 MG: 20 TABLET ORAL at 09:11

## 2020-11-30 RX ADMIN — LISINOPRIL 20 MG: 20 TABLET ORAL at 09:12

## 2020-11-30 NOTE — DISCHARGE INSTRUCTIONS
Patient Education         Discharge Instructions    Patient: Thanh Cotton MRN: [id number]  CSN: [id number]    YOB: 1965  Age: 63 y.o. Sex: female    DOA: 11/28/2020 LOS:  LOS: 2 days   Discharge Date:      ACUTE DIAGNOSES:    1. COPD with acute exacerbation with acute bronchitis   2. Acute hypercapnia, she has chronic hypoxia with intermittent oxygen use at home   3. Atypical chest pain, resolved   4. Tobacco dependence   5. HTN  6. Depression/anxiety        Fibromyalgia    7. Hyperlipidemia   8. GERD   9. Leg pain, neuropathy           DISCHARGE MEDICATIONS:       · It is important that you take the medication exactly as they are prescribed. · Keep your medication in the bottles provided by the pharmacist and keep a list of the medication names, dosages, and times to be taken in your wallet. · Do not take other medications without consulting your doctor. DIET:  Cardiac Diet and Low fat, Low cholesterol    ACTIVITY: Activity as tolerated    ADDITIONAL INFORMATION: If you experience any of the following symptoms then please call your primary care physician or return to the emergency room if you cannot get hold of your doctor: Fever, chills, nausea, vomiting, diarrhea, change in mentation, falling, bleeding, shortness of breath. FOLLOW UP CARE:  Dr. Mary Chua, NP  you are to call and set up an appointment to see them in 5-7 days. Information obtained by :  I understand that if any problems occur once I am at home I am to contact my physician. I understand and acknowledge receipt of the instructions indicated above.                                                                                                                                            Physician's or R.N.'s Signature                                                                  Date/Time Patient or Representative Signature                                                          Date/Time    Brendon Boas, MD  11/30/2020  2:14 PM         DISCHARGE SUMMARY from Nurse    PATIENT INSTRUCTIONS:    After general anesthesia or intravenous sedation, for 24 hours or while taking prescription Narcotics:  · Limit your activities  · Do not drive and operate hazardous machinery  · Do not make important personal or business decisions  · Do  not drink alcoholic beverages  · If you have not urinated within 8 hours after discharge, please contact your surgeon on call. Report the following to your surgeon:  · Excessive pain, swelling, redness or odor of or around the surgical area  · Temperature over 100.5  · Nausea and vomiting lasting longer than 4 hours or if unable to take medications  · Any signs of decreased circulation or nerve impairment to extremity: change in color, persistent  numbness, tingling, coldness or increase pain  · Any questions    What to do at Home:  Recommended activity: Activity as tolerated. If you experience any of the following symptoms: chest pain, shortness of breath, or nausea/vomiting, please follow up with Marilou Chaudhari NP.    *  Please give a list of your current medications to your Primary Care Provider. *  Please update this list whenever your medications are discontinued, doses are      changed, or new medications (including over-the-counter products) are added. *  Please carry medication information at all times in case of emergency situations. These are general instructions for a healthy lifestyle:    No smoking/ No tobacco products/ Avoid exposure to second hand smoke  Surgeon General's Warning:  Quitting smoking now greatly reduces serious risk to your health.     Obesity, smoking, and sedentary lifestyle greatly increases your risk for illness    A healthy diet, regular physical exercise & weight monitoring are important for maintaining a healthy lifestyle    You may be retaining fluid if you have a history of heart failure or if you experience any of the following symptoms:  Weight gain of 3 pounds or more overnight or 5 pounds in a week, increased swelling in our hands or feet or shortness of breath while lying flat in bed. Please call your doctor as soon as you notice any of these symptoms; do not wait until your next office visit. Patient armband removed and shredded      The discharge information has been reviewed with the patient. The patient verbalized understanding. Discharge medications reviewed with the patient and appropriate educational materials and side effects teaching were provided. ___________________________________________________________________________________________________________________________________  COPD Exacerbation Plan: Care Instructions  Your Care Instructions     If you have chronic obstructive pulmonary disease (COPD), your usual shortness of breath could suddenly get worse. You may start coughing more and have more mucus. This flare-up is called a COPD exacerbation (say \"[id number]\"). A lung infection or air pollution could set off an exacerbation. Sometimes it can happen after a quick change in temperature or being around chemicals. Work with your doctor to make a plan for dealing with an exacerbation. You can better manage it if you plan ahead. Follow-up care is a key part of your treatment and safety. Be sure to make and go to all appointments, and call your doctor if you are having problems. It's also a good idea to know your test results and keep a list of the medicines you take. How can you care for yourself at home? During an exacerbation  · Do not panic if you start to have one. Quick treatment at home may help you prevent serious breathing problems. If you have a COPD exacerbation plan that you developed with your doctor, follow it.   · Take your medicines exactly as your doctor tells you.  ? Use your inhaler as directed by your doctor. If your symptoms do not get better after you use your medicine, have someone take you to the emergency room. Call an ambulance if necessary. ? With inhaled medicines, a spacer or a nebulizer may help you get more medicine to your lungs. Ask your doctor or pharmacist how to use them properly. Practice using the spacer in front of a mirror before you have an exacerbation. This may help you get the medicine into your lungs quickly. ? If your doctor has given you steroid pills, take them as directed. ? Your doctor may have given you a prescription for antibiotics, which you can fill if you need it. ? Talk to your doctor if you have any problems with your medicine. And call your doctor if you have to use your antibiotic or steroid pills. Preventing an exacerbation  · Do not smoke. This is the most important step you can take to prevent more damage to your lungs and prevent problems. If you already smoke, it is never too late to stop. If you need help quitting, talk to your doctor about stop-smoking programs and medicines. These can increase your chances of quitting for good. · Take your daily medicines as prescribed. · Avoid colds and flu. ? Get a pneumococcal vaccine. ? Get a flu vaccine each year, as soon as it is available. Ask those you live or work with to do the same, so they will not get the flu and infect you. ? Try to stay away from people with colds or the flu. ? Wash your hands often. · Avoid secondhand smoke; air pollution; cold, dry air; hot, humid air; and high altitudes. Stay at home with your windows closed when air pollution is bad. · Learn breathing techniques for COPD, such as breathing through pursed lips. These techniques can help you breathe easier during an exacerbation. When should you call for help? Call 911 anytime you think you may need emergency care.  For example, call if:    · You have severe trouble breathing.     · You have severe chest pain. Call your doctor now or seek immediate medical care if:    · You have new or worse shortness of breath.     · You develop new chest pain.     · You are coughing more deeply or more often, especially if you notice more mucus or a change in the color of your mucus.     · You cough up blood.     · You have new or increased swelling in your legs or belly.     · You have a fever. Watch closely for changes in your health, and be sure to contact your doctor if:    · You need to use your antibiotic or steroid pills.     · Your symptoms are getting worse. Where can you learn more? Go to http://www.gray.com/  Enter U536 in the search box to learn more about \"COPD Exacerbation Plan: Care Instructions. \"  Current as of: February 24, 2020               Content Version: 12.6  © 3914-2342 [employer]. Care instructions adapted under license by [employer] (which disclaims liability or warranty for this information). If you have questions about a medical condition or this instruction, always ask your healthcare professional. [address] any warranty or liability for your use of this information.

## 2020-11-30 NOTE — PROGRESS NOTES
Cardiopulmonary Navigator Recommendations    Discussed with patient benefits of Cardiac/Pulmonary Rehabilitation after discharge. Educational Material provided. Reinforced importance of good nutrition, low sodium diet and monitoring fluid intake, strict medication compliance, daily weights and blood pressure check. Pt is a good candidate for outpatient evaluation. Please consider referring patient for evaluation at [facility]. Rehabilitation:  Cardiac Rehabilitation  Or  Pulmonary Rehabilitation  Or  Acute Inpatient rehabilitation followed by Outpatient Cardiac/Pulmonary Rehabilitation    Criteria:  Cardiac:   Pulmonary:    Acute bronchitis with chronic obstructive pulmonary disease   COPD (chronic obstructive pulmonary disease) with chronic bronchitis   Sleep apnea   NSTEMI (non-ST elevated myocardial infarction)                Home/Stationary Oxygen:  Criteria:  SpO2 89% w/qualifying secondary diagnosis. SpO2 88% or less at rest on room air or PaO2 55 or less    Home Portable Oxygen:   Criteria: SpO2 89% w/qualifying secondary diagnosis.  SpO2 88% or less at rest on room air or PaO2 55 or less        Nebulizer:  Criteria: Hand Held Nebulizer Administration    CPAP/Bipap:  Criteria: Consider sleep study after discharge  Available Devices:  [facility] [address]

## 2020-11-30 NOTE — PROGRESS NOTES
Hospitalist Progress Note    Patient: Gabe Clayton Age: 63 y.o. : 1965 MR#: [id number] SSN: [id number]  Date/Time: 2020 8:52 AM    DOA: 2020  PCP: Tori Salgado NP    Subjective:       Still coughing and wheezing. No fever. Tolerating doxycycline   Blood pressure is better. No chest pain. No fever. Still with bilateral leg pain from her knees down to her ankles, attempt to get duplex but she refused. Tolerated Gabapentin TID but still in pain. She is on Prednisone because she pulled out her IV line yesterday. Interval Hospital Course:    ROS:  No current fever/chills, no headache, no dizziness, no facial pain, no sinus congestion,   No swallowing pain, No chest pain, no palpitation, + shortness of breath, no abd pain,  No diarrhea, no urinary complaint, no leg pain or swelling    Assessment/Plan:     1. COPD with acute exacerbation with acute bronchitis   2. Acute hypercapnia, likely from copd exacerbation, she has chronic hypoxia with intermittent oxygen use at home   3. Atypical chest pain   4. Tobacco dependence   5. HTN  6. Depression/anxiety        Fibromyalgia    7. Hyperlipidemia   8. GERD   9. Leg pain, not swelling, possible neuropathy, normal range CPK   10. History of narcotic seeking     continue Doxycycline BID, add budesonide/arformoterol, duoneb. Continue prednisone   Cont oxygen supplement if she needed    Cont pepcid  Cont cardiac medications   Can resume statin and femfibrozil, does not think this is myalgia. normal CPK.   Duplex of leg today   Give gabapentin, avoid narcotic     Full code  Disposition: home today or tomorrow       Additional Notes:    Time spent >30 minutes    Case discussed with:  [x]Patient  []Family  [x]Nursing  [x]Case Management  DVT Prophylaxis:  [x]Lovenox  []Hep SQ  []SCDs  []Coumadin   []On Heparin gtt    Signed By: Neal Esquivel MD     2020 8:52 AM              Objective:   VS:   Visit Vitals  BP (!) 162/89 Pulse (!) 54   Temp 97.8 °F (36.6 °C)   Resp 18   Wt 79.4 kg (175 lb)   LMP 2009   SpO2 100%   BMI 35.35 kg/m²      Tmax/24hrs: Temp (24hrs), Av.1 °F (36.7 °C), Min:97.4 °F (36.3 °C), Max:98.9 °F (37.2 °C)      Intake/Output Summary (Last 24 hours) at 2020 6315  Last data filed at 2020 0000  Gross per 24 hour   Intake 480 ml   Output    Net 480 ml       Tele:   General:  Cooperative, Not in acute distress, speaks in full sentence while in bed  HEENT: PERRL, EOMI, supple neck, no JVD, dry oral mucosa  Cardiovascular: S1S2 regular, no rub/gallop   Pulmonary: Clear air entry bilaterally, + wheezing, no crackle  GI:  Soft, non tender, non distended, +bs, no guarding   Extremities:  No pedal edema, +distal pulses appreciated   Neuro: AOx3, moving all extremities, no gross deficit.    Additional:       Current Facility-Administered Medications   Medication Dose Route Frequency    famotidine (PEPCID) tablet 20 mg  20 mg Oral BID    budesonide (PULMICORT) 500 mcg/2 ml nebulizer suspension  500 mcg Nebulization BID RT    arformoteroL (BROVANA) neb solution 15 mcg  15 mcg Nebulization BID RT    doxycycline (VIBRAMYCIN) capsule 100 mg  100 mg Oral Q12H    predniSONE (DELTASONE) tablet 50 mg  50 mg Oral DAILY WITH BREAKFAST    gabapentin (NEURONTIN) capsule 200 mg  200 mg Oral TID    ALPRAZolam (XANAX) tablet 0.5 mg  0.5 mg Oral Q8H PRN    montelukast (SINGULAIR) tablet 10 mg  10 mg Oral QHS    amLODIPine (NORVASC) tablet 10 mg  10 mg Oral QHS    aspirin tablet 325 mg  325 mg Oral DAILY    [Held by provider] atorvastatin (LIPITOR) tablet 20 mg  20 mg Oral QHS    cloNIDine HCL (CATAPRES) tablet 0.2 mg  0.2 mg Oral TID    [Held by provider] gemfibroziL (LOPID) tablet 600 mg  600 mg Oral ACB&D    lisinopriL (PRINIVIL, ZESTRIL) tablet 20 mg  20 mg Oral DAILY    sodium chloride (NS) flush 5-40 mL  5-40 mL IntraVENous Q8H    sodium chloride (NS) flush 5-40 mL  5-40 mL IntraVENous PRN    acetaminophen (TYLENOL) tablet 650 mg  650 mg Oral Q6H PRN    Or    acetaminophen (TYLENOL) suppository 650 mg  650 mg Rectal Q6H PRN    polyethylene glycol (MIRALAX) packet 17 g  17 g Oral DAILY PRN    promethazine (PHENERGAN) tablet 12.5 mg  12.5 mg Oral Q6H PRN    enoxaparin (LOVENOX) injection 40 mg  40 mg SubCUTAneous DAILY    albuterol-ipratropium (DUO-NEB) 2.5 MG-0.5 MG/3 ML  3 mL Nebulization Q2H PRN    glucose chewable tablet 16 g  4 Tab Oral PRN    glucagon (GLUCAGEN) injection 1 mg  1 mg IntraMUSCular PRN    dextrose (D50W) injection syrg 12.5-25 g  25-50 mL IntraVENous PRN    insulin lispro (HUMALOG) injection   SubCUTAneous AC&HS    albuterol-ipratropium (DUO-NEB) 2.5 MG-0.5 MG/3 ML  3 mL Nebulization Q4H RT            Lab/Data Review:  Labs: Results:       Chemistry Recent Labs     11/30/20  0536 11/28/20  0950   GLU 99 90    144   K 4.0 3.7    110   CO2 29 31   BUN 17 14   CREA 0.92 0.93   BUCR 18 15   AGAP 1* 3   CA 8.7 8.4*     Recent Labs     11/28/20  0950   ALT 20   TP 6.4   ALB 3.4   GLOB 3.0   AGRAT 1.1      CBC w/Diff Recent Labs     11/30/20  0536 11/28/20  0950   WBC 10.4 5.7   RBC 4.12* 3.83*   HGB 11.9* 11.3*   HCT 37.7 34.6*   MCV 91.5 90.3   MCH 28.9 29.5   MCHC 31.6 32.7   RDW 13.3 12.8    172   GRANS  --  58   LYMPH  --  32   EOS  --  3      Coagulation No results for input(s): PTP, INR, APTT, INREXT, INREXT in the last 72 hours.     Iron/Ferritin Lab Results   Component Value Date/Time    Ferritin 41 09/09/2020 05:09 AM       BNP    Cardiac Enzymes Lab Results   Component Value Date/Time    CK 34 11/30/2020 05:36 AM    CK - MB 2.1 11/28/2020 11:32 AM    CK-MB Index 1.4 11/28/2020 11:32 AM    Troponin-I <0.015 12/07/2015 05:23 PM    Troponin-I, QT <0.02 11/28/2020 11:32 AM    BNP 19.9 12/07/2015 05:23 PM        Lactic Acid    Thyroid Studies          All Micro Results     Procedure Component Value Units Date/Time    CULTURE, RESPIRATORY/SPUTUM/BRONCH Akshat Cassidy STAIN [[id number]]     Order Status:  Sent Specimen:  Sputum             Images:    CT (Most Recent). XRAY (Most Recent) XR Results (most recent):  Results from Hospital Encounter encounter on 11/28/20   XR CHEST PORT    Narrative EXAM:  Chest Portable. INDICATION:  Chest pain, shortness of breath, wheezing     COMPARISON:  11/16/20     TECHNIQUE:  Portable AP chest study    FINDINGS:      - Slight prominence of the cardiac silhouette. - Subtle peripheral septal lines thickening in the left mid to lower lung zones. - No definite airspace opacities, pleural effusion or pneumothorax. Impression IMPRESSION:    1. Subtle peripheral septal lines thickening with interval development of mild  cardiac silhouette prominence. Potentially suggestive of slight interstitial  edema from cardiogenic causes or from increased intravascular fluid volume. 2.  No definite airspace opacities. EKG No results found for this or any previous visit.      2D ECHO

## 2020-11-30 NOTE — PROGRESS NOTES
Problem: Mobility Impaired (Adult and Pediatric)  Goal: *Acute Goals and Plan of Care (Insert Text)  Description: Physical Therapy Goals  Initiated 11/30/2020 and to be accomplished within 7 day(s)  1. Patient will move from supine to sit and sit to supine , scoot up and down, and roll side to side in bed with modified independence. 2.  Patient will transfer from bed to chair and chair to bed with modified independence using the least restrictive device. 3.  Patient will perform sit to stand with modified independence. 4.  Patient will ambulate with modified independence for 100 feet with the least restrictive device. 5.  Patient will ascend/descend stairs pending safety and necessity for discharge    PLOF: Pt lives alone but states her daughter checks on her often, has a cane and walker that she uses intermittently. Outcome: Progressing Towards Goal     PHYSICAL THERAPY EVALUATION    Patient: Shira Mandujano (24 y.o. female)  Date: 11/30/2020  Primary Diagnosis: Acute bronchitis with chronic obstructive pulmonary disease (COPD) ([facility] [facility] [J44.0, J20.9]        Precautions:      WBAT  PLOF: see above    ASSESSMENT :  Based on the objective data described below, the patient presents with decreased activity tolerance and functional mobility, seen with OT to maximize safety during session. Pt seen in bed in NAD and agreeable. Pt reports R leg pain but has been ambulatory in her room. ROM is WFL and strength is generally decreased but functional. Pt is able to perform bed mobility with mod ind this date as well as stands up with no AD with supervision. Pt amb at decreased speed and step clearance with no AD x 20 ft in/out of bathroom this date and noted to be mod ind for toileting and pericare. Pt demonstrates reaching for support when ambulating and is mobilizing at a decreased ability than her baseline thus will benefit from skilled acute PT to maximize functional mobility and safety.  Pt is left in bed with all needs met, recommend return home with [facility] and family support. Will continue per POC. Patient will benefit from skilled intervention to address the above impairments. Patient's rehabilitation potential is considered to be Good  Factors which may influence rehabilitation potential include:   []         None noted  []         Mental ability/status  [x]         Medical condition  []         Home/family situation and support systems  []         Safety awareness  []         Pain tolerance/management  []         Other:      PLAN :  Recommendations and Planned Interventions:   [x]           Bed Mobility Training             [x]    Neuromuscular Re-Education  [x]           Transfer Training                   []    Orthotic/Prosthetic Training  [x]           Gait Training                          []    Modalities  [x]           Therapeutic Exercises           []    Edema Management/Control  [x]           Therapeutic Activities            [x]    Family Training/Education  [x]           Patient Education  []           Other (comment):    Frequency/Duration: Patient will be followed by physical therapy 1-2 times per day/4-7 days per week to address goals. Discharge Recommendations: Home Health  Further Equipment Recommendations for Discharge: N/A     SUBJECTIVE:   Patient stated what do I need to do?     OBJECTIVE DATA SUMMARY:     Past Medical History:   Diagnosis Date    CHF (congestive heart failure) ([facility] [address]     Chronic [facility] with hypoxia [facility] 9/7/2020    CKD (chronic kidney disease) stage 3, GFR 30-59 ml/min 10/31/2019    Cocaine abuse ([facility] [address] 11/26/2017    COPD (chronic obstructive pulmonary disease) with chronic bronchitis ([facility] [address] 12/19/2017    Dependence on supplemental oxygen     Depression 3/22/2020    Drug-seeking behavior 11/19/2016    Endocrine disease     thyroid issues    Essential hypertension 3/10/2017    Fe deficiency anemia 10/27/2012    Fibromyalgia 12/16/2016    Gastroesophageal reflux disease without esophagitis 10/10/2016    Gastrointestinal disorder     \"blockage in my stomach\"    Hypercholesterolemia     Hypertension     Hypertensive heart failure ([facility] 12/19/2017    Morbid obesity due to excess calories ([facility] [facility] 3/10/2017    NSTEMI (non-ST elevated myocardial infarction) ([facility] 3/22/2020    On home O2 12/3/2015    Overview:  2L at home per pt for approx 8 years    Polysubstance abuse ([facility] [facility] 9/15/2018    Respiratory failure ([facility] [facility] 1/11/2017    S/P hernia repair 10/31/2019    Sleep apnea 12/19/2017    T wave inversion in EKG 3/9/2019    Tobacco use 9/58/2548    Uncomplicated severe persistent asthma 12/13/2016    Vocal cord disease 12/7/2016     Past Surgical History:   Procedure Laterality Date    HX GI      Exploratory laparotomy with lysis of adhesions and primary repair of incarcerated umbilical hernia     Barriers to Learning/Limitations: None  Compensate with: N/A  Home Situation:  Home Situation  Home Environment: Private residence  One/Two Story Residence: One story  Living Alone: No  Support Systems: Family member(s)  Patient Expects to be Discharged to[de-identified] Private residence  Current DME Used/Available at Home: Oxygen, portable  Critical Behavior:  Neurologic State: Alert  Orientation Level: Oriented X4  Cognition: Follows commands  Safety/Judgement: Fall prevention  Psychosocial  Patient Behaviors: Calm; Cooperative  Needs Expressed: Educational;Nutritional  Purposeful Interaction: No (comment)  Pt Identified Daily Priority: Clinical issues (comment)  Caritas Process: Establish trust;Teaching/learning  Caring Interventions: Reassure  Reassure: Therapeutic listening; Informing  Therapeutic Modalities: Deep breathing; Intentional therapeutic touch                 Strength:    Strength: Generally decreased, functional    Tone & Sensation:   Tone: Normal    Sensation: Intact    Range Of Motion:  AROM: Within functional limits    Functional Mobility:  Bed Mobility:     Supine to Sit: Modified independent  Sit to Supine: Modified independent     Transfers:  Sit to Stand: Supervision  Stand to Sit: Supervision    Balance:   Sitting: Intact  Standing: Without support  Standing - Static: Good  Standing - Dynamic : Fair    Ambulation/Gait Training:  Distance (ft): 20 Feet (ft)(x 2 )  Assistive Device: Other (comment)(none )  Ambulation - Level of Assistance: Supervision        Gait Abnormalities: Decreased step clearance        Base of Support: Widened  Stance: Right decreased  Speed/Daphne: Pace decreased (<100 feet/min)  Step Length: Right shortened;Left shortened    Pain:  Pain level pre-treatment: pt reports R leg pain, does not rate /10   Pain level post-treatment: \"   \" /10   Pain Intervention(s) : Medication (see MAR); Rest,  Repositioning  Response to intervention: Nurse notified    Activity Tolerance:   Good    Please refer to the flowsheet for vital signs taken during this treatment. After treatment:   []         Patient left in no apparent distress sitting up in chair  [x]         Patient left in no apparent distress in bed  [x]         Call bell left within reach  [x]         Nursing notified  []         Caregiver present  []         Bed alarm activated  []         SCDs applied    COMMUNICATION/EDUCATION:   [x]         Role of Physical Therapy in the acute care setting. [x]         Fall prevention education was provided and the patient/caregiver indicated understanding. [x]         Patient/family have participated as able in goal setting and plan of care. [x]         Patient/family agree to work toward stated goals and plan of care. []         Patient understands intent and goals of therapy, but is neutral about his/her participation. []         Patient is unable to participate in goal setting/plan of care: ongoing with therapy staff.  []         Other:     Thank you for this referral.  Shraddha Coleman   Time Calculation: 10 mins      Eval Complexity: History: MEDIUM  Complexity : 1-2 comorbidities / personal factors will impact the outcome/ POC Exam:MEDIUM Complexity : 3 Standardized tests and measures addressing body structure, function, activity limitation and / or participation in recreation  Presentation: MEDIUM Complexity : Evolving with changing characteristics  Clinical Decision Making:Medium Complexity    Overall Complexity:MEDIUM

## 2020-11-30 NOTE — PROGRESS NOTES
Pt refused accu-check. Pt accepted meds. At times cussing at nursing staff. Pt let staff take vital signs.

## 2020-11-30 NOTE — PROGRESS NOTES
Problem: Falls - Risk of  Goal: *Absence of Falls  Description: Document Marilee Gamboa Fall Risk and appropriate interventions in the flowsheet.   Outcome: Progressing Towards Goal  Note: Fall Risk Interventions:  Mobility Interventions: Bed/chair exit alarm              Elimination Interventions: Call light in reach              Problem: Patient Education: Go to Patient Education Activity  Goal: Patient/Family Education  Outcome: Progressing Towards Goal

## 2020-11-30 NOTE — INTERDISCIPLINARY ROUNDS
Interdisciplinary Round Note Patient Information: Patient Information: [facility] 
                                    [id number] Reason for Admission: Acute bronchitis with chronic obstructive pulmonary disease (COPD) ([address] [J44.0, J20.9] Attending Provider:   Lorri Barrios MD 
 Past Medical History:  
Past Medical History:  
Diagnosis Date  CHF (congestive heart failure) ([address]  Chronic respiratory failure with hypoxia ([address] 9/7/2020  CKD (chronic kidney disease) stage 3, GFR 30-59 ml/min 10/31/2019  Cocaine abuse ([address] 11/26/2017  COPD (chronic obstructive pulmonary disease) with chronic bronchitis ([address] 12/19/2017  Dependence on supplemental oxygen  Depression 3/22/2020  Drug-seeking behavior 11/19/2016  Endocrine disease   
 thyroid issues  Essential hypertension 3/10/2017  Fe deficiency anemia 10/27/2012  Fibromyalgia 12/16/2016  Gastroesophageal reflux disease without esophagitis 10/10/2016  Gastrointestinal disorder \"blockage in my stomach\"  Hypercholesterolemia  Hypertension  Hypertensive heart failure ([address] 12/19/2017  Morbid obesity due to excess calories ([address] 3/10/2017  
 NSTEMI (non-ST elevated myocardial infarction) ([address] 3/22/2020  On home O2 12/3/2015 Overview:  2L at home per pt for approx 8 years  Polysubstance abuse ([address] 9/15/2018  Respiratory failure ([address] 1/11/2017  S/P hernia repair 10/31/2019  Sleep apnea 12/19/2017  T wave inversion in EKG 3/9/2019  Tobacco use 9/28/2019  Uncomplicated severe persistent asthma 12/13/2016  Vocal cord disease 12/7/2016 Hospital day: 2 Estimated discharge date: 11/30/2020 RRAT Score: Medium Risk   
      
 20 Total Score 3 Has Seen PCP in Last 6 Months (Yes=3, No=0)  
 11 IP Visits Last 12 Months (1-3=4, 4=9, >4=11) 4 Pt. Coverage (Medicare=5 , Medicaid, or Self-Pay=4) 2 Charlson Comorbidity Score (Age + Comorbid Conditions) Criteria that do not apply:  
 . Living with Significant Other. Assisted Living. LTAC. SNF. or  
Rehab Patient Length of Stay (>5 days = 3) The patient will require the following interventions based on the Readmission Risk Assessment:  Care Management involvement for home health follow up for:  DME Goals for Today:  
-Complete duplex  
-Continue oral steroids, antibiotics and nebulizers Overnight Events: 
Patient continued to refuse blood sugar checks and the reinsertion of peripheral IV. Patient initially refused RT, however, then stated she could not breath and became upset when RT could not instantly be available to her. VITAL SIGNS Vitals:  
 11/30/20 0000 11/30/20 0400 11/30/20 0846 11/30/20 4294 BP: (!) 154/77 (!) 162/89  135/71 Pulse: (!) 57 (!) 54  77 Resp: 20 18  18 Temp: 98.2 °F (36.8 °C) 97.8 °F (36.6 °C)  96.9 °F (36.1 °C) SpO2: 100% 100% 100% 97% Weight:      
LMP: 04/14/2009 Lines, Drains, & Airways None. Patient pulled out VTE Prophylaxis Lovenox Intake and Output:  
11/28 1901 - 11/30 0700 In: 480 [P.O.:480] Out: - No intake/output data recorded. Current Diet: DIET CARDIAC Regular Nutrition Chewing/Swallowing Problems: No 
Difficulty with Secretions: No 
Speech Slurred/Thick/Garbled: No 
  
Recent Glucose Results:  
Lab Results Component Value Date/Time GLU 99 11/30/2020 05:36 AM  
 GLUCPOC 118 (H) 11/30/2020 09:11 AM  
 GLUCPOC 159 (H) 11/29/2020 05:35 PM  
 
 
 Sepsis Re-Assessment Documentation:  
 
Date: 11/30/2020 Time: 10:10 AM 
Lactic Acid level:   
 
Vital Signs Level of Consciousness: Alert Temp: 96.9 °F (36.1 °C) Temp Source: Oral 
Pulse (Heart Rate): 77 Heart Rate Source: Brachial 
Cardiac Rhythm: Normal sinus rhythm Resp Rate: 18 
BP: 135/71 MAP (Monitor): 87 MAP (Calculated): 92 BP 1 Location: Left arm BP 1 Method: Automatic 
BP Patient Position: Sitting MEWS Score: 1 Vitals:  
 11/30/20 0000 11/30/20 0400 11/30/20 0846 11/30/20 8975 BP: (!) 154/77 (!) 162/89  135/71 Pulse: (!) 57 (!) 54  77 Resp: 20 18  18 Temp: 98.2 °F (36.8 °C) 97.8 °F (36.6 °C)  96.9 °F (36.1 °C) SpO2: 100% 100% 100% 97% Vitals:  
 11/30/20 0000 11/30/20 0400 11/30/20 0846 11/30/20 6450 BP: (!) 154/77 (!) 162/89  135/71 Pulse: (!) 57 (!) 54  77 Resp: 20 18  18 Temp: 98.2 °F (36.8 °C) 97.8 °F (36.6 °C)  96.9 °F (36.1 °C) SpO2: 100% 100% 100% 97% IV Antibiotics? None. Oral antibiotics Activity Level: Activity Level: Up ad tess Needs assistance with ADLs: no 
PT Consult Status: YES Current Immunizations: 
Immunization History Administered Date(s) Administered  Influenza Vaccine TURN8) PF (>6 Mo Flulaval, Fluarix, and >3 Yrs [address] [place] [address]) 10/26/2015, 10/14/2019  Pneumococcal Polysaccharide (PPSV-23) 10/26/2015 Recommendations:  
 
Discharge Management:  Home with Home Health COVID status: Negative Will the patient require COVID testing prior to discharge for placement?: NO Medication Reconciliation Completed: YES Cardiac/Pulmonary Rehab Ordered:  NO 
 
Patient/family/caregiver \"PREFERENCES\" for discharge:None Recommendations from IDR team: Aim to complete duplex today. Continue documentation of patient's noncompliance with care. Transfer Level of Care:  Progressing to Transfer During rounds the following quality care indicators and evidence based practices were addressed :   Smoking Cessation Interdisciplinary team rounds were held on 11/30/2020 with the following team [facility] Management, Nursing, Physician and Unit Director and the  patient. Plan of care discussed. See clinical pathway and/or care plan for interventions and desired outcomes.

## 2020-11-30 NOTE — ROUTINE PROCESS
Bedside and Verbal shift change report given to [address] [place] [address] Road (oncoming nurse) by Elyssa Guerra RN (offgoing nurse). Report included the following information SBAR, Kardex and MAR.

## 2020-11-30 NOTE — PROGRESS NOTES
Patient was walked around the unit without supplemental oxygen on, and a pulse oximetr in place. Patient's oxygen saturation ranged between 94%, 95% and 96% without supplemental oxygen on. Assessment completed per Dr. Manuela Williamson order.

## 2020-11-30 NOTE — PROGRESS NOTES
Problem: Self Care Deficits Care Plan (Adult)  Goal: *Acute Goals and Plan of Care (Insert Text)  Outcome: Resolved/Met   OCCUPATIONAL THERAPY EVALUATION/DISCHARGE    Patient: Jennie Rivera (89 y.o. female)  Date: 11/30/2020  Primary Diagnosis: Acute bronchitis with chronic obstructive pulmonary disease (COPD) ([facility] [J44.0, J20.9]        Precautions:      PLOF: Pt initially reported she was (I) with basic self-care/ADLs but later stated daughter would provide assistance with socks/shoes at home. ASSESSMENT AND RECOMMENDATIONS:  Pt cleared to participate in OT evaluation by Rn. Upon entering room, pt supine with HOB elevated, alert, and agreeable to therapy session. Pt seen in conjunction with PT to increase pt participation and to maximize safety of patient and staff members. Based on the objective data described below, the patient presents she is Mod(I) with most basic self-care/ADLs in sitting, requiring supervision/SBA for functional standing activities, including toilet transfers and grooming at sink level. Note some unsteadiness during ambulation, however pt did not present with any LOB. After activity, HR 60s, SpO2 at 97-98% with 4L O2 via NC. Will defer to PT for further functional balance and mobility tasks. Pt reports daughter provides min assist for LB ADLs at baseline. Pt states she is in the process of having a shower chair. As pt presents she is at or near her baseline, skilled occupational therapy is not indicated at this time, therefore OT to d/c pt from caseload.     Discharge Recommendations: Home Health   Further Equipment Recommendations for Discharge: N/A     SUBJECTIVE:   Patient stated \"I have sciatica\"    OBJECTIVE DATA SUMMARY:     Past Medical History:   Diagnosis Date    CHF (congestive heart failure) [facility] [facility]     [facility] 9/7/2020    CKD (chronic kidney disease) stage 3, GFR 30-59 ml/min 10/31/2019    Coc[facility] ([facility] 11/26/2017    COPD (chronic obstructive pulmonary disease) with chronic bronchitis ([address] 12/19/2017    Dependence on supplemental oxygen     Depression 3/22/2020    Drug-seeking behavior 11/19/2016    Endocrine disease     thyroid issues    Essential hypertension 3/10/2017    Fe deficiency anemia 10/27/2012    Fibromyalgia 12/16/2016    Gastroesophageal reflux disease without esophagitis 10/10/2016    Gastrointestinal disorder     \"blockage in my stomach\"    Hypercholesterolemia     Hypertension     Hypertensive heart failure ([address] 12/19/2017    Morbid obesity due to excess calories ([address] 3/10/2017    NSTEMI (non-ST elevated myocardial infarction) ([address] 3/22/2020    On home O2 12/3/2015    Overview:  2L at home per pt for approx 8 years    Polysubstance abuse ([address] 9/15/2018    Respiratory failure ([address] 1/11/2017    S/P hernia repair 10/31/2019    Sleep apnea 12/19/2017    T wave inversion in EKG 3/9/2019    Tobacco use 9/97/0642    Uncomplicated severe persistent asthma 12/13/2016    Vocal cord disease 12/7/2016     Past Surgical History:   Procedure Laterality Date    HX GI      Exploratory laparotomy with lysis of adhesions and primary repair of incarcerated umbilical hernia     Barriers to Learning/Limitations: None  Compensate with: visual, verbal, tactile, kinesthetic cues/model    Home Situation:   Home Situation  Home Environment: Other (comment)(Patient refused to answer)  One/Two Story Residence: Other (Comment)(Patient refused to answer)  Living Alone: No  Support Systems: Other (comments)  Patient Expects to be Discharged to[de-identified] Other (comment)  Current DME Used/Available at Home: Oxygen, portable  []     Right hand dominant   []     Left hand dominant    Cognitive/Behavioral Status:  Neurologic State: Alert  Orientation Level: Appropriate for age  Cognition: Follows commands  Safety/Judgement: Fall prevention    Skin: Visible skin appeared intact  Edema: None noted      Coordination: BUE  Coordination: Within functional limits  Fine Motor Skills-Upper: Left Intact; Right Intact    Gross Motor Skills-Upper: Left Intact; Right Intact    Balance:  Sitting: Intact  Standing: Without support  Standing - Static: Good  Standing - Dynamic : Fair    Strength: BUE  Strength: Generally decreased, functional    Tone & Sensation: BUE  Tone: Normal  Sensation: Intact    Range of Motion: BUE  AROM: Within functional limits      Functional Mobility and Transfers for ADLs:  Bed Mobility:  Supine to Sit: Supervision  Sit to Supine: Supervision  Transfers:  Sit to Stand: Stand-by assistance  Stand to Sit: Stand-by assistance   Toilet Transfer : Stand-by assistance   Bathroom Mobility: Stand-by assistance    ADL Assessment:  Feeding: Modified independent    Oral Facial Hygiene/Grooming: Stand-by assistance(standing at sink leve)    Bathing: Stand-by assistance    Upper Body Dressing: Modified independent    Lower Body Dressing: Minimum assistance    Toileting: Stand by assistance      ADL Intervention:  Grooming  Grooming Assistance: Supervision  Position Performed: Standing  Washing Hands: Supervision    Toileting  Toileting Assistance: Stand-by assistance  Clothing Management: Stand-by assistance    Cognitive Retraining  Safety/Judgement: Fall prevention        Pain:  Pain level pre-treatment: 6/10  (Headache)  Pain level post-treatment: 6/10   Pain Intervention(s): Medication (see MAR); Rest, Ice, Repositioning  Response to intervention: Nurse notified, See doc flow    Activity Tolerance:   Good    Please refer to the flowsheet for vital signs taken during this treatment.   After treatment:   []  Patient left in no apparent distress sitting up in chair  [x]  Patient left in no apparent distress in bed  [x]  Call bell left within reach  []  Nursing notified  []  Caregiver present  []  Bed alarm activated    COMMUNICATION/EDUCATION:   [x]      Role of Occupational Therapy in the acute care setting  [x]      Home safety education was provided and the patient/caregiver indicated understanding. [x]      Patient/family have participated as able and agree with findings and recommendations. []      Patient is unable to participate in plan of care at this time. Thank you for this referral.  Yamilet Redman MS, OTR/L  Time Calculation: 11 mins      Eval Complexity: History: MEDIUM Complexity : Expanded review of history including physical, cognitive and psychosocial  history ; Examination: LOW Complexity : 1-3 performance deficits relating to physical, cognitive , or psychosocial skils that result in activity limitations and / or participation restrictions ;    Decision Making:LOW Complexity : No comorbidities that affect functional and no verbal or physical assistance needed to complete eval tasks

## 2020-11-30 NOTE — PROGRESS NOTES
Reason for Readmission:    Acute bronchitis with chronic obstructive pulmonary disease (COPD) ([facility] [address] [J44.0, J20.9]         RUR Score and Risk Level:      96     Level of Readmission:    3   (1-3)   (1 - First Readmission, 2- Second Readmission within 30 days or multiple admissions over previous 90 days, 3- Greater than two readmissions within 30 days or multiple admissions over previous 90 days)      Care Conference scheduled:   (consider for all patient's with readmission level of 2, should definitely be scheduled for all patients with readmission level of 3)       Resources/supports as identified by patient/family:         Top Challenges facing patient (as identified by patient/family and CM): Finances/Medication cost?     No   Transportation      Medicaid cab  Support system or lack thereof? Family support  Living arrangements? Lives alone   Self-care/ADLs/Cognition? Independent, AOx4       Current Advanced Directive/Advance Care Plan:             Plan for utilizing home health:   yes. Likelihood of additional readmission:                Transition of Care Plan:    Based on readmission, the patient's previous Plan of Care   has been evaluated and/or modified. The current Transition of Care Plan is:            Initial assessment completed with patient. Cognitive status of patient: oriented to time, place, person and situation. Face sheet information confirmed:  yes. The patient designates her daughter Consuelo Stewart [phone] to participate in her discharge plan and to receive any needed information. This patient lives in a home. Patient is able to navigate steps as needed. Prior to hospitalization, patient was considered to be independent with ADLs/IADLS : yes . Patient has a current ACP document on file: yes  The Medicaid cab will be available to transport patient home upon discharge.    The patient already has oxygen  Supplied by [employer] medical equipment available in the home. Patient is not currently active with home health. Patient has not stayed in a skilled nursing facility or rehab. Was  stay within last 60 days : no. This patient is on dialysis :no     List of available Home Health agencies were provided and reviewed with the patient prior to discharge. Freedom of choice signed: yes, for [address]. Currently, the discharge plan is Home with [address] [place] [address] Mukesh Arce . [facility] for COPD    The patient states that she can obtain her medications from the pharmacy, and take her medications as directed. Patient's current insurance is [facility] Medicaid. Care Management Interventions  PCP Verified by CM: Yes  Palliative Care Criteria Met (RRAT>21 & CHF Dx)?: No  Mode of Transport at Discharge:  Other (see comment)(medicaid cab)  Transition of Care Consult (CM Consult): Discharge Planning  Physical Therapy Consult: Yes  Occupational Therapy Consult: Yes  Current Support Network: Lives Alone  Confirm Follow Up Transport: Self  Discharge Location  Discharge Placement: Home with home health    Readmission Assessment  Number of days since last admission?: 8-30 days  Previous disposition: Home with Family  Who is being interviewed?: Patient  What was the patient's/caregiver's perception as to why they think they needed to return back to the hospital?: Other (Comment)(COPD exacebation)  Did you visit your Primary Care Physician after you left the hospital, before you returned this time?: Yes  Did you see a specialist, such as Cardiac, Pulmonary, Orthopedic Physician, etc. after you left the hospital?: No  Who advised the patient to return to the hospital?: Self-referral  Does the patient report anything that got in the way of taking their medications?: No  In our efforts to provide the best possible care to you and others like you, can you think of anything that we could have done to help you after you left the hospital the first time, so that you might not have needed to return so soon?: Additional Community resources available for illness support      CHARITO SaxenaN RN  Care Management  Pager: [phone]

## 2020-11-30 NOTE — PROGRESS NOTES
Per pt, her daughter will bring her oxygen from home to pick her up. She stated she has not contacted her daughter because the doctor told her she is going home tomorrow.       CHARITO AveryN RN  Care Management  Pager: [phone]

## 2020-11-30 NOTE — PROGRESS NOTES
Physician Progress Note      PATIENTCuevelin Morton  CSN #:                  [id number]  :                       1965  ADMIT DATE:       2020 6:19 AM  DISCH DATE:  RESPONDING  PROVIDER #:        MAYUR Corral MD          QUERY TEXT:    Patient admitted with BMI 35.35. If possible, please document in progress notes and discharge summary if you are evaluating and /or treating any of the following: The medical record reflects the following:  Risk Factors: Hx of Morbid Obesity, COPD, CHF  Clinical Indicators: BMI 35.35  Treatment: AC/HS, Cardiac Diet    Thank you,  Lily Villalba RN, [facility]  [phone]  Options provided:  -- Obesity  -- Morbid obesity  -- Overweight  -- BMI not clinically significant  -- Other - I will add my own diagnosis  -- Disagree - Not applicable / Not valid  -- Disagree - Clinically unable to determine / Unknown  -- Refer to Clinical Documentation Reviewer    PROVIDER RESPONSE TEXT:    This patient has obesity.     Query created by: Jozef Mendoza on 2020 11:52 AM      Electronically signed by:  [facility] Holden Corral MD 2020 1:13 PM

## 2020-12-01 ENCOUNTER — HOME HEALTH ADMISSION (OUTPATIENT)
Dept: HOME HEALTH SERVICES | Facility: HOME HEALTH | Age: 55
End: 2020-12-01

## 2020-12-01 VITALS
DIASTOLIC BLOOD PRESSURE: 91 MMHG | BODY MASS INDEX: 35.35 KG/M2 | SYSTOLIC BLOOD PRESSURE: 156 MMHG | HEART RATE: 69 BPM | RESPIRATION RATE: 15 BRPM | WEIGHT: 175 LBS | OXYGEN SATURATION: 98 % | TEMPERATURE: 98 F

## 2020-12-01 LAB
ANION GAP SERPL CALC-SCNC: 4 MMOL/L (ref 3–18)
BUN SERPL-MCNC: 17 MG/DL (ref 7–18)
BUN/CREAT SERPL: 17 (ref 12–20)
CALCIUM SERPL-MCNC: 8.7 MG/DL (ref 8.5–10.1)
CHLORIDE SERPL-SCNC: 108 MMOL/L (ref 100–111)
CO2 SERPL-SCNC: 28 MMOL/L (ref 21–32)
CREAT SERPL-MCNC: 1.01 MG/DL (ref 0.6–1.3)
ERYTHROCYTE [DISTWIDTH] IN BLOOD BY AUTOMATED COUNT: 13 % (ref 11.6–14.5)
GLUCOSE SERPL-MCNC: 115 MG/DL (ref 74–99)
HCT VFR BLD AUTO: 37.5 % (ref 35–45)
HGB BLD-MCNC: 12 G/DL (ref 12–16)
MCH RBC QN AUTO: 29.3 PG (ref 24–34)
MCHC RBC AUTO-ENTMCNC: 32 G/DL (ref 31–37)
MCV RBC AUTO: 91.5 FL (ref 74–97)
PLATELET # BLD AUTO: 208 K/UL (ref 135–420)
PMV BLD AUTO: 11.8 FL (ref 9.2–11.8)
POTASSIUM SERPL-SCNC: 3.8 MMOL/L (ref 3.5–5.5)
RBC # BLD AUTO: 4.1 M/UL (ref 4.2–5.3)
SODIUM SERPL-SCNC: 140 MMOL/L (ref 136–145)
WBC # BLD AUTO: 10.8 K/UL (ref 4.6–13.2)

## 2020-12-01 PROCEDURE — 74011250637 HC RX REV CODE- 250/637: Performed by: INTERNAL MEDICINE

## 2020-12-01 PROCEDURE — 74011250637 HC RX REV CODE- 250/637: Performed by: NURSE PRACTITIONER

## 2020-12-01 PROCEDURE — 74011250637 HC RX REV CODE- 250/637: Performed by: EMERGENCY MEDICINE

## 2020-12-01 PROCEDURE — 36415 COLL VENOUS BLD VENIPUNCTURE: CPT

## 2020-12-01 PROCEDURE — 85027 COMPLETE CBC AUTOMATED: CPT

## 2020-12-01 PROCEDURE — 97116 GAIT TRAINING THERAPY: CPT

## 2020-12-01 PROCEDURE — 74011000250 HC RX REV CODE- 250: Performed by: NURSE PRACTITIONER

## 2020-12-01 PROCEDURE — 74011636637 HC RX REV CODE- 636/637: Performed by: INTERNAL MEDICINE

## 2020-12-01 PROCEDURE — 2709999900 HC NON-CHARGEABLE SUPPLY

## 2020-12-01 PROCEDURE — 94640 AIRWAY INHALATION TREATMENT: CPT

## 2020-12-01 PROCEDURE — 99218 HC RM OBSERVATION: CPT

## 2020-12-01 PROCEDURE — 74011000250 HC RX REV CODE- 250: Performed by: INTERNAL MEDICINE

## 2020-12-01 PROCEDURE — 99239 HOSP IP/OBS DSCHRG MGMT >30: CPT | Performed by: INTERNAL MEDICINE

## 2020-12-01 PROCEDURE — 80048 BASIC METABOLIC PNL TOTAL CA: CPT

## 2020-12-01 RX ORDER — ALPRAZOLAM 0.5 MG/1
0.5 TABLET ORAL
Qty: 20 TAB | Refills: 0 | Status: SHIPPED | OUTPATIENT
Start: 2020-12-01 | End: 2020-12-08

## 2020-12-01 RX ADMIN — Medication 10 ML: at 05:48

## 2020-12-01 RX ADMIN — DOXYCYCLINE HYCLATE 100 MG: 100 CAPSULE ORAL at 08:46

## 2020-12-01 RX ADMIN — BUDESONIDE 500 MCG: 0.5 SUSPENSION RESPIRATORY (INHALATION) at 07:13

## 2020-12-01 RX ADMIN — CLONIDINE HYDROCHLORIDE 0.2 MG: 0.1 TABLET ORAL at 08:46

## 2020-12-01 RX ADMIN — IPRATROPIUM BROMIDE AND ALBUTEROL SULFATE 3 ML: .5; 3 SOLUTION RESPIRATORY (INHALATION) at 12:00

## 2020-12-01 RX ADMIN — LISINOPRIL 20 MG: 20 TABLET ORAL at 08:46

## 2020-12-01 RX ADMIN — ALPRAZOLAM 0.5 MG: 0.5 TABLET ORAL at 01:45

## 2020-12-01 RX ADMIN — ASPIRIN 325 MG ORAL TABLET 325 MG: 325 PILL ORAL at 08:45

## 2020-12-01 RX ADMIN — IPRATROPIUM BROMIDE AND ALBUTEROL SULFATE 3 ML: .5; 3 SOLUTION RESPIRATORY (INHALATION) at 07:13

## 2020-12-01 RX ADMIN — IPRATROPIUM BROMIDE AND ALBUTEROL SULFATE 3 ML: .5; 3 SOLUTION RESPIRATORY (INHALATION) at 04:05

## 2020-12-01 RX ADMIN — FAMOTIDINE 20 MG: 20 TABLET, FILM COATED ORAL at 08:46

## 2020-12-01 RX ADMIN — PREDNISONE 50 MG: 20 TABLET ORAL at 08:45

## 2020-12-01 RX ADMIN — GABAPENTIN 200 MG: 100 CAPSULE ORAL at 08:46

## 2020-12-01 RX ADMIN — Medication 10 ML: at 01:46

## 2020-12-01 RX ADMIN — ARFORMOTEROL TARTRATE 15 MCG: 15 SOLUTION RESPIRATORY (INHALATION) at 07:13

## 2020-12-01 NOTE — PROGRESS NOTES
Problem: Falls - Risk of  Goal: *Absence of Falls  Description: Document Veatrice Marker Fall Risk and appropriate interventions in the flowsheet.   Outcome: Resolved/Met  Note: Fall Risk Interventions:  Mobility Interventions: Bed/chair exit alarm              Elimination Interventions: Call light in reach              Problem: Patient Education: Go to Patient Education Activity  Goal: Patient/Family Education  Outcome: Resolved/Met     Problem: Patient Education: Go to Patient Education Activity  Goal: Patient/Family Education  Outcome: Resolved/Met     Problem: Patient Education: Go to Patient Education Activity  Goal: Patient/Family Education  Outcome: Resolved/Met

## 2020-12-01 NOTE — HOME CARE
Discharge noted for today. Received home health referral for [facility] for [facility] for COPD. Patient discharged prior to receiving referral. Unable to verify demographics and several numbers listed in chart are not in service. This writer spoke with patient 's PCP's office to verify demographics. Two phone numbers were provided by PCP's office - one number was not in service and a message was left at the second number. Referral sent to central office.   Radhika Salas, [facility] Liaison

## 2020-12-01 NOTE — PROGRESS NOTES
[facility] order sent to [facility] was notified. Discharge order noted for today. Pt has been accepted to [facility] agency. Met with patient and are agreeable to the transition plan today. Transport has been arranged through her family. Patient's discharge summary and home health  orders have been forwarded to [address] [place] [address] via Updated bedside RN, Selam ,  to the transition plan.   Discharge information has been documented on the AVS.           CHARITO BurgerN RN  Care Management  Pager: [phone]

## 2020-12-01 NOTE — PROGRESS NOTES
Problem: Mobility Impaired (Adult and Pediatric)  Goal: *Acute Goals and Plan of Care (Insert Text)  Description: Physical Therapy Goals  Initiated 11/30/2020 and to be accomplished within 7 day(s)  1. Patient will move from supine to sit and sit to supine , scoot up and down, and roll side to side in bed with modified independence. 2.  Patient will transfer from bed to chair and chair to bed with modified independence using the least restrictive device. 3.  Patient will perform sit to stand with modified independence. 4.  Patient will ambulate with modified independence for 100 feet with the least restrictive device. 5.  Patient will ascend/descend stairs pending safety and necessity for discharge    PLOF: Pt lives alone but states her daughter checks on her often, has a cane and walker that she uses intermittently. Outcome: Resolved/Met   PHYSICAL THERAPY TREATMENT AND DISCHARGE    Patient: Jerome Webster (70 y.o. female)  Date: 12/1/2020  Diagnosis: Acute bronchitis with chronic [facility] pulmonary disease [facility]COPD[facility] [J44.0, J20.9]   <principal problem not specified>       Precautions:  standard    ASSESSMENT:  Patient is cleared by nursing for PT, and patient consents to therapy. Pt supine in bed upon arrival. Pt is independent with bed mobility, transfers, and gait. Pt has good balance moving around in her room including performing standing bathroom tasks at the sink. Pt ambulating in hallway 250 feet with 2 standing rest breaks with cues for breathing technique. Discussed safety with gait. Pt denies needing to practice stairs. Pt's SpO2 stays above 96% throughout therapy session on room air and HR 67 at rest and 81 after gait. Pt does become shortness of breath with gait and activities requiring rest breaks and cues for breathing. Pt ended therapy in her room with all needs met.         PLAN:  Maximum therapeutic gains met at current level of care and patient will be discharged from physical therapy at this time. Rationale for discharge:    [x]     Goals Achieved  []     [address]  []     Patient not participating in therapy  []     Other:  Discharge Recommendations:  Outpatient Pulmonary Rehab  Further Equipment Recommendations for Discharge:  N/A     SUBJECTIVE:   Patient stated I need to clean up before I go home.  \"Check it now (her O2 levels)\"    OBJECTIVE DATA SUMMARY:   Critical Behavior:  Neurologic State: Alert  Orientation Level: Oriented X4  Cognition: Follows commands  Safety/Judgement: Fall prevention  Functional Mobility Training:  Bed Mobility:     Supine to Sit: Independent  Sit to Supine: Independent            Transfers:  Sit to Stand: Independent  Stand to Sit: Independent                             Balance:  Sitting: Intact  Standing: Intact  Standing - Static: Good  Standing - Dynamic : Good     Ambulation/Gait Training:  Distance (ft): 250 Feet (ft)(SpO2 >96% on room air )  Assistive Device: (none)  Ambulation - Level of Assistance: Independent;Supervision  Base of Support: Center of gravity altered       Stairs:   Pt denied any issues with stairs        Therapeutic Exercises:   Reviewed and performed ankle pumps to increase blood flow and circulation. Pain:  No pain noted before, during, or at end of session. Activity Tolerance:   good  Please refer to the flowsheet for vital signs taken during this treatment. After treatment:   [] Patient left in no apparent distress sitting up in chair  [x] Patient left in no apparent distress in her room  [x] Call bell left within reach  [x] Personal items in reach  [] Nursing notified   [] Caregiver present  [] Bed/chair alarm activated  [] SCDs applied      COMMUNICATION/EDUCATION:   [x]         Role of Physical Therapy in the acute care setting. [x]         Fall prevention education was provided and the patient/caregiver indicated understanding.   [x]         Patient/family have participated as able and agree with findings and recommendations. []         Patient is unable to participate in plan of care at this time. [x]         Out of bed at least 3-5 times a day.   []         Other:        Martin Wen, PT, DPT   Time Calculation: 13 mins

## 2020-12-01 NOTE — PROGRESS NOTES
Bedside shift change report given to William Rosen RN   (oncoming nurse) by Sivan Trevino RN (offgoing nurse). Report included the following information SBAR, Kardex and MAR.

## 2020-12-01 NOTE — PROGRESS NOTES
Pt provided d/c instructions. Opportunity for questions and answers provided. Pt has medication to be filled from inpatient pharmacy, pharmacy request insurance card \"Per patient my  will bring my medicaid card to the pharmacy because I don't have a copy\". Pt provided hard copy prescription for xanax.

## 2020-12-01 NOTE — DISCHARGE SUMMARY
Discharge Summary    Patient: Fermín Ribera               Sex: female          DOA: 11/28/2020         YOB: 1965      Age:  63 y.o.        LOS:  LOS: 3 days                Admit Date: 11/28/2020    Discharge Date: 12/1/2020    Primary care physician: Rogerio Trotter NP    Discharge Diagnoses:    1. COPD with acute exacerbation with acute bronchitis, improved  2. Acute hypercapnia, likely from copd exacerbation, she has chronic hypoxia with intermittent oxygen use at home   3. Atypical chest pain   4. Tobacco dependence   5. HTN  6. Depression/anxiety        Fibromyalgia    7. Hyperlipidemia   8. GERD   9. Leg pain, not swelling, possible neuropathy, normal range CPK   10. History of narcotic seeking     Discharge Condition: Good  Disposition: home with home health  Code Status: full code     Follow up for Primary Care Physician:  1) she needs to continue with steroid and antibiotics for the next week. Please monitor for clinical improvement. 2) she needs follow up with her psychiatry for further care. 3) please noted that her lipid management has been changed. She needs further follow up for her leg pain     Hospital Course:   63 y.o female with HTN, CAD, COPD, CKD3, GERD, active tobacco smoking, presented with cough and chest pain and shortness of breath. She denied fever. She stated that she ran out of medications at home. She wean oxygen but use it intermittently. In the ER, she was diagnosed with COPD exacerbation. She was started on steroid and neb treatment. She has 1 dose of lasix. Her hospitalization was uneventful. She was started on doxycycline while continue her steroid and neb treatment. She has no fever and improved. She was able to walk without hypoxia on room air. She has leg pain, chronic. She was given gabapentin with improvement. She has duplex but half done, negative for dvt.  Her statin was held due to concern for myalgia but CPK was normal. She was advised to stop this for a while and follow up with PCP       Last 24 Hours: no overnight event, wheezing and shortness of breath resolved. Still with cough. No fever. She has been ambulating with therapy in the sam[address] way. She has tolerated oral Prednisone since yesterday    ROS: No current fever/chills, no headache, no dizziness, no facial pain, no sinus congestion,   No swallowing pain, No chest pain, no palpitation, no shortness of breath, no abd pain,  No diarrhea, no urinary complaint, no leg pain or swelling    VS:   Visit Vitals  BP (!) 156/91 (BP 1 Location: Left arm, BP Patient Position: At rest)   Pulse 69   Temp 98 °F (36.7 °C)   Resp 15   Wt 79.4 kg (175 lb)   LMP 2009   SpO2 98%   BMI 35.35 kg/m²      Tmax/24hrs: Temp (24hrs), Av °F (36.7 °C), Min:97.4 °F (36.3 °C), Max:98.4 °F (36.9 °C)      Intake/Output Summary (Last 24 hours) at 2020 1019  Last data filed at 2020 0810  Gross per 24 hour   Intake    Output 2400 ml   Net -2400 ml     Tele:   General:  Cooperative, Not in acute distress, speaks in short sentence while in bed  HEENT: PERRL, EOMI, supple neck, no JVD, dry oral mucosa  Cardiovascular: S1S2 regular, no rub/gallop   Pulmonary: air entry bilaterally, no wheezing, no crackle  GI:  Soft, non tender, non distended, +bs, no guarding   Extremities:  No pedal edema, +distal pulses appreciated   Neuro: AOx3, moving all extremities, no gross deficit. Consults: none    Significant Diagnostic Studies:   XR Results (most recent):  Results from Hospital Encounter encounter on 20   XR CHEST PORT    Narrative EXAM:  Chest Portable. INDICATION:  Chest pain, shortness of breath, wheezing     COMPARISON:  20     TECHNIQUE:  Portable AP chest study    FINDINGS:      - Slight prominence of the cardiac silhouette. - Subtle peripheral septal lines thickening in the left mid to lower lung zones. - No definite airspace opacities, pleural effusion or pneumothorax.       Impression IMPRESSION:    1. Subtle peripheral septal lines thickening with interval development of mild  cardiac silhouette prominence. Potentially suggestive of slight interstitial  edema from cardiogenic causes or from increased intravascular fluid volume. 2.  No definite airspace opacities. Lab/Data Review:  Labs: Results:       Chemistry Recent Labs     12/01/20 0249 11/30/20  0536   * 99    140   K 3.8 4.0    110   CO2 28 29   BUN 17 17   CREA 1.01 0.92   CA 8.7 8.7   AGAP 4 1*   BUCR 17 18      CBC w/Diff Recent Labs     12/01/20 0249 11/30/20  0536   WBC 10.8 10.4   RBC 4.10* 4.12*   HGB 12.0 11.9*   HCT 37.5 37.7    197      Coagulation No results for input(s): PTP, INR, APTT, INREXT in the last 72 hours. Iron/Ferritin No results for input(s): IRON in the last 72 hours. No lab exists for component: TIBCCALC   BNP No results for input(s): BNPP in the last 72 hours. Cardiac Enzymes Recent Labs     11/30/20 0536 11/28/20  1132   CPK 34 150   CKND1  --  1.4      Liver Enzymes No results for input(s): TP, ALB, TBIL, AP in the last 72 hours. No lab exists for component: SGOT, GPT, DBIL   Thyroid Studies No results for input(s): T4, T3U, TSH, TSHEXT in the last 72 hours. No lab exists for component: T3RU       All Micro Results     Procedure Component Value Units Date/Time    CULTURE, RESPIRATORY/SPUTUM/BRONCH Jesús Bernstein [[id number]]     Order Status:  Sent Specimen:  Sputum                 Medications at discharge  including reasons for change and indications for new ones:   Current Discharge Medication List      START taking these medications    Details   predniSONE (DELTASONE) 50 mg tablet Take 1 Tab by mouth daily. Qty: 7 Tab, Refills: 0      doxycycline (VIBRAMYCIN) 100 mg capsule Take 1 Cap by mouth every twelve (12) hours for 10 days. Qty: 20 Cap, Refills: 0      gabapentin (NEURONTIN) 100 mg capsule Take 2 Caps by mouth three (3) times daily for 10 days.  Max Daily Amount: 600 mg. Indications: neuropathic pain  Qty: 60 Cap, Refills: 0    Associated Diagnoses: Fibromyalgia         CONTINUE these medications which have CHANGED    Details   albuterol (PROVENTIL VENTOLIN) 2.5 mg /3 mL (0.083 %) nebu 3 mL by Nebulization route every six (6) hours as needed for Wheezing, Shortness of Breath or Respiratory Distress. Qty: 30 Nebule, Refills: 0      citalopram (CeleXA) 20 mg tablet Take 1 Tab by mouth every morning. Qty: 30 Tab, Refills: 0      fluticasone furoate-vilanteroL (Breo Ellipta) 200-25 mcg/dose inhaler Take 1 Puff by inhalation daily. Qty: 1 Inhaler, Refills: 0      roflumilast (DALIRESP) 500 mcg tab tablet Take 1 Tab by mouth daily. Qty: 30 Tab, Refills: 0      montelukast (SINGULAIR) 10 mg tablet Take 1 Tab by mouth nightly. Indications: controller medication for asthma  Qty: 30 Tab, Refills: 1         CONTINUE these medications which have NOT CHANGED    Details   cloNIDine HCL (CATAPRES) 0.2 mg tablet Take 0.2 mg by mouth three (3) times daily. lisinopriL (PRINIVIL, ZESTRIL) 20 mg tablet Take 20 mg by mouth daily. amLODIPine (NORVASC) 10 mg tablet Take 10 mg by mouth daily. albuterol (PROVENTIL HFA, VENTOLIN HFA, PROAIR HFA) 90 mcg/actuation inhaler Take 2 Puffs by inhalation every four (4) hours as needed for Wheezing. Qty: 1 Inhaler, Refills: 0      ALPRAZolam (Xanax) 0.5 mg tablet Take 1 Tab by mouth every eight (8) hours as needed for Anxiety. Max Daily Amount: 1.5 mg.  Qty: 20 Tab, Refills: 0    Associated Diagnoses: Anxiety reaction      aspirin delayed-release 81 mg tablet Take 1 Tab by mouth daily. Qty: 30 Tab, Refills: 0      famotidine (PEPCID) 20 mg tablet Take 1 Tab by mouth daily. Qty: 30 Tab, Refills: 1      OXYGEN-AIR DELIVERY SYSTEMS 3 L by IntraNASal route as needed for Other (sob). atorvastatin (LIPITOR) 20 mg tablet Take 1 Tab by mouth nightly.   Qty: 30 Tab, Refills: 0      nitroglycerin (NITROSTAT) 0.4 mg SL tablet Take 1 Tab by mouth every five (5) minutes as needed for Chest Pain. Sit/Lay down then put one tab under the tongue every 5 minutes as needed for chest pain for 3 doses  Qty: 1 Bottle, Refills: 0      naloxone (NARCAN) 4 mg/actuation nasal spray Use 1 spray intranasally, then discard. Repeat with new spray every 2 min as needed for opioid overdose symptoms, alternating nostrils.   Qty: 1 Each, Refills: 0         STOP taking these medications       arformoteroL (BROVANA) 15 mcg/2 mL nebu neb solution Comments:   Reason for Stopping:         budesonide (PULMICORT) 0.5 mg/2 mL nbsp Comments:   Reason for Stopping:         gemfibroziL (LOPID) 600 mg tablet Comments:   Reason for Stopping:         docusate sodium (COLACE) 100 mg capsule Comments:   Reason for Stopping:         predniSONE (STERAPRED DS) 10 mg dose pack Comments:   Reason for Stopping:         sucralfate (CARAFATE) 100 mg/mL suspension Comments:   Reason for Stopping:         QUEtiapine (SEROquel) 50 mg tablet Comments:   Reason for Stopping:                       Pending laboratory work and tests:     Activity: Activity as tolerated    Diet: Cardiac Diet and Low fat, Low cholesterol    Wound Care: None needed        Nancy Gardner MD  12/1/2020  10:19 AM

## 2020-12-02 ENCOUNTER — HOME CARE VISIT (OUTPATIENT)
Dept: HOME HEALTH SERVICES | Facility: HOME HEALTH | Age: 55
End: 2020-12-02

## 2020-12-03 ENCOUNTER — HOME CARE VISIT (OUTPATIENT)
Dept: HOME HEALTH SERVICES | Facility: HOME HEALTH | Age: 55
End: 2020-12-03

## 2020-12-05 ENCOUNTER — HOSPITAL ENCOUNTER (EMERGENCY)
Age: 55
Discharge: HOME | End: 2020-12-05
Attending: EMERGENCY MEDICINE
Payer: MEDICAID

## 2020-12-05 ENCOUNTER — APPOINTMENT (OUTPATIENT)
Dept: GENERAL RADIOLOGY | Age: 55
End: 2020-12-05
Attending: EMERGENCY MEDICINE
Payer: MEDICAID

## 2020-12-05 VITALS
BODY MASS INDEX: 31.1 KG/M2 | OXYGEN SATURATION: 100 % | WEIGHT: 154 LBS | TEMPERATURE: 98.3 F | DIASTOLIC BLOOD PRESSURE: 95 MMHG | HEART RATE: 80 BPM | RESPIRATION RATE: 12 BRPM | SYSTOLIC BLOOD PRESSURE: 212 MMHG

## 2020-12-05 DIAGNOSIS — R07.89 ATYPICAL CHEST PAIN: ICD-10-CM

## 2020-12-05 DIAGNOSIS — J44.1 COPD EXACERBATION (HCC): Primary | ICD-10-CM

## 2020-12-05 LAB
ANION GAP SERPL CALC-SCNC: 2 MMOL/L (ref 3–18)
BASOPHILS # BLD: 0 K/UL (ref 0–0.1)
BASOPHILS NFR BLD: 0 % (ref 0–2)
BUN SERPL-MCNC: 27 MG/DL (ref 7–18)
BUN/CREAT SERPL: 14 (ref 12–20)
CALCIUM SERPL-MCNC: 9.1 MG/DL (ref 8.5–10.1)
CHLORIDE SERPL-SCNC: 106 MMOL/L (ref 100–111)
CK MB CFR SERPL CALC: 2.5 % (ref 0–4)
CK MB SERPL-MCNC: 1.5 NG/ML (ref 5–25)
CK SERPL-CCNC: 59 U/L (ref 26–192)
CO2 SERPL-SCNC: 34 MMOL/L (ref 21–32)
CREAT SERPL-MCNC: 1.91 MG/DL (ref 0.6–1.3)
DIFFERENTIAL METHOD BLD: NORMAL
EOSINOPHIL # BLD: 0.2 K/UL (ref 0–0.4)
EOSINOPHIL NFR BLD: 2 % (ref 0–5)
ERYTHROCYTE [DISTWIDTH] IN BLOOD BY AUTOMATED COUNT: 12.9 % (ref 11.6–14.5)
GLUCOSE SERPL-MCNC: 107 MG/DL (ref 74–99)
HCT VFR BLD AUTO: 43.7 % (ref 35–45)
HGB BLD-MCNC: 14.2 G/DL (ref 12–16)
LYMPHOCYTES # BLD: 3.5 K/UL (ref 0.9–3.6)
LYMPHOCYTES NFR BLD: 32 % (ref 21–52)
MCH RBC QN AUTO: 29.6 PG (ref 24–34)
MCHC RBC AUTO-ENTMCNC: 32.5 G/DL (ref 31–37)
MCV RBC AUTO: 91.2 FL (ref 74–97)
MONOCYTES # BLD: 0.6 K/UL (ref 0.05–1.2)
MONOCYTES NFR BLD: 5 % (ref 3–10)
NEUTS SEG # BLD: 6.7 K/UL (ref 1.8–8)
NEUTS SEG NFR BLD: 61 % (ref 40–73)
PLATELET # BLD AUTO: 256 K/UL (ref 135–420)
PMV BLD AUTO: 10.9 FL (ref 9.2–11.8)
POTASSIUM SERPL-SCNC: 4.2 MMOL/L (ref 3.5–5.5)
RBC # BLD AUTO: 4.79 M/UL (ref 4.2–5.3)
SODIUM SERPL-SCNC: 142 MMOL/L (ref 136–145)
TROPONIN I SERPL-MCNC: 0.02 NG/ML (ref 0–0.04)
WBC # BLD AUTO: 11 K/UL (ref 4.6–13.2)

## 2020-12-05 PROCEDURE — 74011250637 HC RX REV CODE- 250/637: Performed by: PHYSICIAN ASSISTANT

## 2020-12-05 PROCEDURE — 80048 BASIC METABOLIC PNL TOTAL CA: CPT

## 2020-12-05 PROCEDURE — 96375 TX/PRO/DX INJ NEW DRUG ADDON: CPT

## 2020-12-05 PROCEDURE — 93005 ELECTROCARDIOGRAM TRACING: CPT

## 2020-12-05 PROCEDURE — 96365 THER/PROPH/DIAG IV INF INIT: CPT

## 2020-12-05 PROCEDURE — 99285 EMERGENCY DEPT VISIT HI MDM: CPT

## 2020-12-05 PROCEDURE — 85025 COMPLETE CBC W/AUTO DIFF WBC: CPT

## 2020-12-05 PROCEDURE — 82550 ASSAY OF CK (CPK): CPT

## 2020-12-05 PROCEDURE — 74011000250 HC RX REV CODE- 250: Performed by: PHYSICIAN ASSISTANT

## 2020-12-05 PROCEDURE — 71045 X-RAY EXAM CHEST 1 VIEW: CPT

## 2020-12-05 PROCEDURE — 74011250636 HC RX REV CODE- 250/636: Performed by: PHYSICIAN ASSISTANT

## 2020-12-05 RX ORDER — IPRATROPIUM BROMIDE AND ALBUTEROL SULFATE 2.5; .5 MG/3ML; MG/3ML
3 SOLUTION RESPIRATORY (INHALATION)
Status: COMPLETED | OUTPATIENT
Start: 2020-12-05 | End: 2020-12-05

## 2020-12-05 RX ORDER — SODIUM CHLORIDE 9 MG/ML
1000 INJECTION, SOLUTION INTRAVENOUS ONCE
Status: DISCONTINUED | OUTPATIENT
Start: 2020-12-05 | End: 2020-12-06 | Stop reason: HOSPADM

## 2020-12-05 RX ORDER — CLONIDINE HYDROCHLORIDE 0.2 MG/1
0.2 TABLET ORAL 3 TIMES DAILY
Qty: 21 TAB | Refills: 0 | Status: SHIPPED | OUTPATIENT
Start: 2020-12-05 | End: 2020-12-12

## 2020-12-05 RX ORDER — CLONIDINE HYDROCHLORIDE 0.1 MG/1
0.2 TABLET ORAL
Status: COMPLETED | OUTPATIENT
Start: 2020-12-05 | End: 2020-12-05

## 2020-12-05 RX ORDER — MAGNESIUM SULFATE HEPTAHYDRATE 40 MG/ML
2 INJECTION, SOLUTION INTRAVENOUS ONCE
Status: COMPLETED | OUTPATIENT
Start: 2020-12-05 | End: 2020-12-05

## 2020-12-05 RX ORDER — ALBUTEROL SULFATE 1.25 MG/3ML
1.25 SOLUTION RESPIRATORY (INHALATION)
Qty: 25 EACH | Refills: 0 | Status: SHIPPED | OUTPATIENT
Start: 2020-12-05 | End: 2020-12-18

## 2020-12-05 RX ORDER — LORAZEPAM 2 MG/ML
0.5 INJECTION INTRAMUSCULAR ONCE
Status: COMPLETED | OUTPATIENT
Start: 2020-12-05 | End: 2020-12-05

## 2020-12-05 RX ADMIN — MAGNESIUM SULFATE HEPTAHYDRATE 2 G: 40 INJECTION, SOLUTION INTRAVENOUS at 20:11

## 2020-12-05 RX ADMIN — CLONIDINE HYDROCHLORIDE 0.2 MG: 0.1 TABLET ORAL at 20:11

## 2020-12-05 RX ADMIN — IPRATROPIUM BROMIDE AND ALBUTEROL SULFATE 3 ML: .5; 3 SOLUTION RESPIRATORY (INHALATION) at 20:11

## 2020-12-05 RX ADMIN — LORAZEPAM 0.5 MG: 2 INJECTION INTRAMUSCULAR; INTRAVENOUS at 20:37

## 2020-12-05 NOTE — ED PROVIDER NOTES
EMERGENCY DEPARTMENT HISTORY AND PHYSICAL EXAM    5:22 PM      Date: 2020  Patient Name: Kristan Apgar    History of Presenting Illness     Chief Complaint   Patient presents with    Chest Pain         History Provided By: Patient  Location/Duration/Severity/Modifying factors   63 y.o female with HTN, CAD, COPD, CKD3, GERD, active tobacco smoking, presented chest pain and shortness of breath since she was discharged on 20 where she was treated for COPD exacerbation. She reports sternal chest pain that is neither positional nor exertional and described as pressure. Her pain is rated as a 8 out of 10. She reports her pain has been constant since discharge. She reports associated shortness of breath that is worse with exertion. She has associated nausea but denies any fever, vomiting, abdominal pain, or dysuria. Patient reports that she ran out of her clonidine and has not taken any blood pressure medication for the past 3 days. She is currently finishing up a prednisone taper. Patient is requesting methadone because she did not go to the clinic today and get her normal dose    PCP: Gricelda Isidro NP    Current Facility-Administered Medications   Medication Dose Route Frequency Provider Last Rate Last Dose    0.9% sodium chloride infusion 1,000 mL  1,000 mL IntraVENous ONCE Bunny Martinez, DO         Current Outpatient Medications   Medication Sig Dispense Refill    cloNIDine HCL (CATAPRES) 0.2 mg tablet Take 1 Tab by mouth three (3) times daily for 7 days. Indications: high blood pressure 21 Tab 0    albuterol (ACCUNEB) 1.25 mg/3 mL nebu Take 3 mL by inhalation every four (4) hours as needed for Wheezing (wheezing). 25 Each 0    ALPRAZolam (Xanax) 0.5 mg tablet Take 1 Tab by mouth every eight (8) hours as needed for Anxiety for up to 7 days. Max Daily Amount: 1.5 mg.  Indications: anxiousness associated with depression 20 Tab 0    albuterol (PROVENTIL VENTOLIN) 2.5 mg /3 mL (0.083 %) nebu 3 mL by Nebulization route every six (6) hours as needed for Wheezing, Shortness of Breath or Respiratory Distress. 30 Nebule 0    citalopram (CeleXA) 20 mg tablet Take 1 Tab by mouth every morning. 30 Tab 0    fluticasone furoate-vilanteroL (Breo Ellipta) 200-25 mcg/dose inhaler Take 1 Puff by inhalation daily. 1 Inhaler 0    roflumilast (DALIRESP) 500 mcg tab tablet Take 1 Tab by mouth daily. 30 Tab 0    montelukast (SINGULAIR) 10 mg tablet Take 1 Tab by mouth nightly. Indications: controller medication for asthma 30 Tab 1    predniSONE (DELTASONE) 50 mg tablet Take 1 Tab by mouth daily. 7 Tab 0    doxycycline (VIBRAMYCIN) 100 mg capsule Take 1 Cap by mouth every twelve (12) hours for 10 days. 20 Cap 0    gabapentin (NEURONTIN) 100 mg capsule Take 2 Caps by mouth three (3) times daily for 10 days. Max Daily Amount: 600 mg. Indications: neuropathic pain 60 Cap 0    cloNIDine HCL (CATAPRES) 0.2 mg tablet Take 0.2 mg by mouth three (3) times daily.  lisinopriL (PRINIVIL, ZESTRIL) 20 mg tablet Take 20 mg by mouth daily.  amLODIPine (NORVASC) 10 mg tablet Take 10 mg by mouth daily.  albuterol (PROVENTIL HFA, VENTOLIN HFA, PROAIR HFA) 90 mcg/actuation inhaler Take 2 Puffs by inhalation every four (4) hours as needed for Wheezing. 1 Inhaler 0    nitroglycerin (NITROSTAT) 0.4 mg SL tablet Take 1 Tab by mouth every five (5) minutes as needed for Chest Pain. Sit/Lay down then put one tab under the tongue every 5 minutes as needed for chest pain for 3 doses (Patient taking differently: Take 0.4 mg by mouth every five (5) minutes as needed for Chest Pain. Sit/Lay down then put one tab under the tongue every 5 minutes as needed for chest pain for 3 doses    Pt states she is out of this medication) 1 Bottle 0    aspirin delayed-release 81 mg tablet Take 1 Tab by mouth daily. 30 Tab 0    famotidine (PEPCID) 20 mg tablet Take 1 Tab by mouth daily.  (Patient taking differently: Take 20 mg by mouth daily as needed.) 30 Tab 1    OXYGEN-AIR DELIVERY SYSTEMS 3 L by IntraNASal route as needed for Other (sob).  atorvastatin (LIPITOR) 20 mg tablet Take 1 Tab by mouth nightly. 30 Tab 0    naloxone (NARCAN) 4 mg/actuation nasal spray Use 1 spray intranasally, then discard. Repeat with new spray every 2 min as needed for opioid overdose symptoms, alternating nostrils.  1 Each 0       Past History     Past Medical History:  Past Medical History:   Diagnosis Date    CHF (congestive heart failure) (HCC)     Chronic respiratory failure with hypoxia ([address] 9/7/2020    CKD (chronic kidney disease) stage 3, GFR 30-59 ml/min 10/31/2019    Cocaine abuse ([address] 11/26/2017    COPD (chronic obstructive pulmonary disease) with chronic bronchitis ([address] 12/19/2017    Dependence on supplemental oxygen     Depression 3/22/2020    Drug-seeking behavior 11/19/2016    Endocrine disease     thyroid issues    Essential hypertension 3/10/2017    Fe deficiency anemia 10/27/2012    Fibromyalgia 12/16/2016    Gastroesophageal reflux disease without esophagitis 10/10/2016    Gastrointestinal disorder     \"blockage in my stomach\"    Hypercholesterolemia     Hypertension     Hypertensive heart failure ([address] 12/19/2017    Morbid obesity due to excess calories ([address] 3/10/2017    NSTEMI (non-ST elevated myocardial infarction) ([address] 3/22/2020    On home O2 12/3/2015    Overview:  2L at home per pt for approx 8 years    Polysubstance abuse ([address] 9/15/2018    Respiratory failure ([address] 1/11/2017    S/P hernia repair 10/31/2019    Sleep apnea 12/19/2017    T wave inversion in EKG 3/9/2019    Tobacco use 9/91/1240    Uncomplicated severe persistent asthma 12/13/2016    Vocal cord disease 12/7/2016       Past Surgical History:  Past Surgical History:   Procedure Laterality Date    HX GI      Exploratory laparotomy with lysis of adhesions and primary repair of incarcerated umbilical hernia       Family History:  No family history on file.    Social History:  Social History     Tobacco Use    Smoking status: Former Smoker     Packs/day: 0.25    Smokeless tobacco: Current User    Tobacco comment: Pt states she has not had money to buy cigarettes in the past month   Substance Use Topics    Alcohol use: No     Comment: socially    Drug use: Not Currently     Types: Heroin     Comment: pt reports using approximately a month ago       Allergies:  No Known Allergies      Review of Systems     Review of Systems   Constitutional: Positive for fatigue. Negative for chills and fever. HENT: Negative for sinus pain, trouble swallowing and voice change. Eyes: Negative for visual disturbance. Respiratory: Positive for cough, chest tightness, shortness of breath and wheezing. Cardiovascular: Positive for chest pain. Negative for palpitations and leg swelling. Gastrointestinal: Negative for abdominal pain, constipation, diarrhea, nausea and vomiting. Endocrine: Negative. Genitourinary: Negative for dysuria, flank pain and hematuria. Musculoskeletal: Negative for gait problem. Skin: Negative. Neurological: Negative for syncope, weakness and headaches. Psychiatric/Behavioral: Negative for confusion. Physical Exam     Visit Vitals  BP (!) 212/95   Pulse 80   Temp 98.3 °F (36.8 °C)   Resp 12   Wt 69.9 kg (154 lb)   LMP 04/14/2009   SpO2 100%   BMI 31.10 kg/m²     Physical Exam  Vitals signs reviewed. Constitutional:       General: She is not in acute distress. Appearance: She is well-developed. HENT:      Head: Normocephalic and atraumatic. Eyes:      Extraocular Movements: Extraocular movements intact. Pupils: Pupils are equal, round, and reactive to light. Neck:      Musculoskeletal: Normal range of motion. Cardiovascular:      Rate and Rhythm: Normal rate and regular rhythm. Heart sounds: Normal heart sounds.    Pulmonary:      Effort: Pulmonary effort is normal. No tachypnea, accessory muscle usage or respiratory distress. Breath sounds: Examination of the right-lower field reveals wheezing. Examination of the left-lower field reveals wheezing. Wheezing present. Chest:      Chest wall: No tenderness. Abdominal:      Palpations: Abdomen is soft. Tenderness: There is no abdominal tenderness. There is no guarding or rebound. Musculoskeletal:      Right lower leg: No edema. Left lower leg: No edema. Skin:     General: Skin is warm and dry. Capillary Refill: Capillary refill takes less than 2 seconds. Neurological:      General: No focal deficit present. Mental Status: She is alert. Psychiatric:         Mood and Affect: Mood normal.           Diagnostic Study Results     Labs -  Recent Results (from the past 12 hour(s))   EKG, 12 LEAD, INITIAL    Collection Time: 12/05/20  4:45 PM   Result Value Ref Range    Ventricular Rate 85 BPM    Atrial Rate 85 BPM    P-R Interval 116 ms    QRS Duration 84 ms    Q-T Interval 412 ms    QTC Calculation (Bezet) 490 ms    Calculated P Axis 77 degrees    Calculated R Axis 44 degrees    Calculated T Axis 78 degrees    Diagnosis       Normal sinus rhythm  Possible Left atrial enlargement  Nonspecific T wave abnormality  Prolonged QT  Abnormal ECG  When compared with ECG of 29-NOV-2020 07:55,  Nonspecific T wave abnormality no longer evident in Inferior leads  QT has lengthened     CBC WITH AUTOMATED DIFF    Collection Time: 12/05/20  4:50 PM   Result Value Ref Range    WBC 11.0 4.6 - 13.2 K/uL    RBC 4.79 4.20 - 5.30 M/uL    HGB 14.2 12.0 - 16.0 g/dL    HCT 43.7 35.0 - 45.0 %    MCV 91.2 74.0 - 97.0 FL    MCH 29.6 24.0 - 34.0 PG    MCHC 32.5 31.0 - 37.0 g/dL    RDW 12.9 11.6 - 14.5 %    PLATELET 546 282 - 712 K/uL    MPV 10.9 9.2 - 11.8 FL    NEUTROPHILS 61 40 - 73 %    LYMPHOCYTES 32 21 - 52 %    MONOCYTES 5 3 - 10 %    EOSINOPHILS 2 0 - 5 %    BASOPHILS 0 0 - 2 %    ABS. NEUTROPHILS 6.7 1.8 - 8.0 K/UL    ABS.  LYMPHOCYTES 3.5 0.9 - 3.6 K/UL ABS. MONOCYTES 0.6 0.05 - 1.2 K/UL    ABS. EOSINOPHILS 0.2 0.0 - 0.4 K/UL    ABS. BASOPHILS 0.0 0.0 - 0.1 K/UL    DF AUTOMATED     METABOLIC PANEL, BASIC    Collection Time: 12/05/20  4:50 PM   Result Value Ref Range    Sodium 142 136 - 145 mmol/L    Potassium 4.2 3.5 - 5.5 mmol/L    Chloride 106 100 - 111 mmol/L    CO2 34 (H) 21 - 32 mmol/L    Anion gap 2 (L) 3.0 - 18 mmol/L    Glucose 107 (H) 74 - 99 mg/dL    BUN 27 (H) 7.0 - 18 MG/DL    Creatinine 1.91 (H) 0.6 - 1.3 MG/DL    BUN/Creatinine ratio 14 12 - 20      GFR est AA 33 (L) >60 ml/min/1.73m2    GFR est non-AA 27 (L) >60 ml/min/1.73m2    Calcium 9.1 8.5 - 10.1 MG/DL   CARDIAC PANEL,(CK, CKMB & TROPONIN)    Collection Time: 12/05/20  4:50 PM   Result Value Ref Range    CK - MB 1.5 <3.6 ng/ml    CK-MB Index 2.5 0.0 - 4.0 %    CK 59 26 - 192 U/L    Troponin-I, QT 0.02 0.0 - 0.045 NG/ML       Radiologic Studies -   XR CHEST PORT   Final Result   IMPRESSION[de-identified]      1.  No acute cardiopulmonary disease. Medical Decision Making   I am the first provider for this patient. I reviewed the vital signs, available nursing notes, past medical history, past surgical history, family history and social history. Vital Signs-Reviewed the patient's vital signs. EKG: Interpreted by myself and Dr. Elaine Morel: rate 85, , QRS 84, QTc 490. Normal sinus rhythm. There are no signs of acute ischemic changes. Records Reviewed: Nursing Notes and Old Medical Records (Time of Review: 5:22 PM)    ED Course: Progress Notes, Reevaluation, and Consults:  2032- patient request her home dose of Xanax. 2110- patient feeling much better and requesting to go home. Provider Notes (Medical Decision Making):     28-year-old female presenting with chest pain of uncertain etiology and shortness of breath since her discharge from [facility] on 11/28/20.     Initial consideration in this patient included exacerbation of COPD, acute decompensated heart failure, acute coronary syndrome (ACS), acute bronchitis, pneumonia, influenza, asthma exacerbation, allergic rhinitis, upper respiratory infections (URI), foreign body airway obstruction, pulmonary embolism (PE) among others. ?? Patient presented for shortness of breath with a known history of COPD consistent with acute exacerbation. ? Patients lung sounds were notable for expiratory wheezing. ? Patient noted to have no evidence of fever or other systemic infectious symptoms. ? A chest x-ray was obtained and noted to have no evidence of consolidations suggestive of pneumonia or other acute abnormality. ? An EKG was obtained and noted to have no evidence of acute abnormalities     Treatment was initiated with nebulized bronchodilators (albuterol and ipratropium) and magnesium was started. Patient was noted to have significant improvement with this treatment. ? Based on her history, lab analysis, EKG, and imaging, in addition to the patient's physical exam, I see no evidence at this time for a malignant etiology for the patient's chest pain. There is no acute evidence for pulmonary embolus, acute myocardial infarction, pneumothorax, esophageal rupture, cardiac tamponade, thoracic artery dissection, or any other emergent cardiac, pulmonary or aortic pathology at this time. Antibiotics were felt to be not indicated. The patient is still on a prednisone taper from previous discharge, advised to continue. We will refill the patient's clonidine and albuterol nebs, and have the patient follow-up with her PCP on Monday for extended refills    The patient reported current tobacco use and we discussed smoking cessation prior to discharge with the patient noted to be pre-contemplative desire to quit at this time. Diagnosis     Clinical Impression:   1. COPD exacerbation ([address]    2.  Atypical chest pain        Disposition: Discharge home to follow-up with PCP in two days    Follow-up Information    None          Patient's Medications   Start Taking ALBUTEROL (ACCUNEB) 1.25 MG/3 ML NEBU    Take 3 mL by inhalation every four (4) hours as needed for Wheezing (wheezing). CLONIDINE HCL (CATAPRES) 0.2 MG TABLET    Take 1 Tab by mouth three (3) times daily for 7 days. Indications: high blood pressure   Continue Taking    ALBUTEROL (PROVENTIL HFA, VENTOLIN HFA, PROAIR HFA) 90 MCG/ACTUATION INHALER    Take 2 Puffs by inhalation every four (4) hours as needed for Wheezing. ALBUTEROL (PROVENTIL VENTOLIN) 2.5 MG /3 ML (0.083 %) NEBU    3 mL by Nebulization route every six (6) hours as needed for Wheezing, Shortness of Breath or Respiratory Distress. ALPRAZOLAM (XANAX) 0.5 MG TABLET    Take 1 Tab by mouth every eight (8) hours as needed for Anxiety for up to 7 days. Max Daily Amount: 1.5 mg. Indications: anxiousness associated with depression    AMLODIPINE (NORVASC) 10 MG TABLET    Take 10 mg by mouth daily. ASPIRIN DELAYED-RELEASE 81 MG TABLET    Take 1 Tab by mouth daily. ATORVASTATIN (LIPITOR) 20 MG TABLET    Take 1 Tab by mouth nightly. CITALOPRAM (CELEXA) 20 MG TABLET    Take 1 Tab by mouth every morning. CLONIDINE HCL (CATAPRES) 0.2 MG TABLET    Take 0.2 mg by mouth three (3) times daily. DOXYCYCLINE (VIBRAMYCIN) 100 MG CAPSULE    Take 1 Cap by mouth every twelve (12) hours for 10 days. FAMOTIDINE (PEPCID) 20 MG TABLET    Take 1 Tab by mouth daily. FLUTICASONE FUROATE-VILANTEROL (BREO ELLIPTA) 200-25 MCG/DOSE INHALER    Take 1 Puff by inhalation daily. GABAPENTIN (NEURONTIN) 100 MG CAPSULE    Take 2 Caps by mouth three (3) times daily for 10 days. Max Daily Amount: 600 mg. Indications: neuropathic pain    LISINOPRIL (PRINIVIL, ZESTRIL) 20 MG TABLET    Take 20 mg by mouth daily. MONTELUKAST (SINGULAIR) 10 MG TABLET    Take 1 Tab by mouth nightly. Indications: controller medication for asthma    NALOXONE (NARCAN) 4 MG/ACTUATION NASAL SPRAY    Use 1 spray intranasally, then discard.  Repeat with new spray every 2 min as needed for opioid overdose symptoms, alternating nostrils. NITROGLYCERIN (NITROSTAT) 0.4 MG SL TABLET    Take 1 Tab by mouth every five (5) minutes as needed for Chest Pain. Sit/Lay down then put one tab under the tongue every 5 minutes as needed for chest pain for 3 doses    OXYGEN-AIR DELIVERY SYSTEMS    3 L by IntraNASal route as needed for Other (sob). PREDNISONE (DELTASONE) 50 MG TABLET    Take 1 Tab by mouth daily. ROFLUMILAST (DALIRESP) 500 MCG TAB TABLET    Take 1 Tab by mouth daily. These Medications have changed    No medications on file   Stop Taking    No medications on file     Disclaimer: Sections of this note are dictated using utilizing voice recognition software. Minor typographical errors may be present. If questions arise, please do not hesitate to contact me or call our department. Robyn Hubbard.  [address] [facility], [address] [facility]  Emergency Medicine Resident, PGY-1

## 2020-12-06 LAB
ATRIAL RATE: 85 BPM
CALCULATED P AXIS, ECG09: 77 DEGREES
CALCULATED R AXIS, ECG10: 44 DEGREES
CALCULATED T AXIS, ECG11: 78 DEGREES
DIAGNOSIS, 93000: NORMAL
P-R INTERVAL, ECG05: 116 MS
Q-T INTERVAL, ECG07: 412 MS
QRS DURATION, ECG06: 84 MS
QTC CALCULATION (BEZET), ECG08: 490 MS
VENTRICULAR RATE, ECG03: 85 BPM

## 2020-12-10 ENCOUNTER — TELEPHONE (OUTPATIENT)
Dept: CARDIAC REHAB | Age: 55
End: 2020-12-10

## 2020-12-14 ENCOUNTER — HOSPITAL ENCOUNTER (OUTPATIENT)
Age: 55
Setting detail: OBSERVATION
Discharge: HOME HEALTH | End: 2020-12-16
Attending: EMERGENCY MEDICINE | Admitting: HOSPITALIST
Payer: MEDICAID

## 2020-12-14 ENCOUNTER — APPOINTMENT (OUTPATIENT)
Dept: GENERAL RADIOLOGY | Age: 55
End: 2020-12-14
Attending: EMERGENCY MEDICINE
Payer: MEDICAID

## 2020-12-14 DIAGNOSIS — J44.1 COPD EXACERBATION (HCC): Primary | ICD-10-CM

## 2020-12-14 LAB
ANION GAP SERPL CALC-SCNC: 3 MMOL/L (ref 3–18)
BASOPHILS # BLD: 0 K/UL (ref 0–0.1)
BASOPHILS NFR BLD: 0 % (ref 0–2)
BNP SERPL-MCNC: 854 PG/ML (ref 0–900)
BUN SERPL-MCNC: 16 MG/DL (ref 7–18)
BUN/CREAT SERPL: 14 (ref 12–20)
CALCIUM SERPL-MCNC: 9.3 MG/DL (ref 8.5–10.1)
CHLORIDE SERPL-SCNC: 103 MMOL/L (ref 100–111)
CK MB CFR SERPL CALC: 2.3 % (ref 0–4)
CK MB SERPL-MCNC: 1.1 NG/ML (ref 5–25)
CK SERPL-CCNC: 47 U/L (ref 26–192)
CO2 SERPL-SCNC: 33 MMOL/L (ref 21–32)
COVID-19 RAPID TEST, COVR: NOT DETECTED
CREAT SERPL-MCNC: 1.18 MG/DL (ref 0.6–1.3)
DIFFERENTIAL METHOD BLD: ABNORMAL
EOSINOPHIL # BLD: 0.1 K/UL (ref 0–0.4)
EOSINOPHIL NFR BLD: 1 % (ref 0–5)
ERYTHROCYTE [DISTWIDTH] IN BLOOD BY AUTOMATED COUNT: 12.8 % (ref 11.6–14.5)
GLUCOSE SERPL-MCNC: 120 MG/DL (ref 74–99)
HCT VFR BLD AUTO: 41.6 % (ref 35–45)
HGB BLD-MCNC: 13.5 G/DL (ref 12–16)
LYMPHOCYTES # BLD: 1.9 K/UL (ref 0.9–3.6)
LYMPHOCYTES NFR BLD: 14 % (ref 21–52)
MCH RBC QN AUTO: 29.9 PG (ref 24–34)
MCHC RBC AUTO-ENTMCNC: 32.5 G/DL (ref 31–37)
MCV RBC AUTO: 92 FL (ref 74–97)
MONOCYTES # BLD: 0.6 K/UL (ref 0.05–1.2)
MONOCYTES NFR BLD: 5 % (ref 3–10)
NEUTS SEG # BLD: 10.4 K/UL (ref 1.8–8)
NEUTS SEG NFR BLD: 80 % (ref 40–73)
PLATELET # BLD AUTO: 224 K/UL (ref 135–420)
PMV BLD AUTO: 11.1 FL (ref 9.2–11.8)
POTASSIUM SERPL-SCNC: 3 MMOL/L (ref 3.5–5.5)
RBC # BLD AUTO: 4.52 M/UL (ref 4.2–5.3)
SODIUM SERPL-SCNC: 139 MMOL/L (ref 136–145)
SOURCE, COVRS: NORMAL
SPECIMEN TYPE, XMCV1T: NORMAL
TROPONIN I SERPL-MCNC: <0.02 NG/ML (ref 0–0.04)
WBC # BLD AUTO: 13 K/UL (ref 4.6–13.2)

## 2020-12-14 PROCEDURE — 74011000250 HC RX REV CODE- 250: Performed by: EMERGENCY MEDICINE

## 2020-12-14 PROCEDURE — 74011250637 HC RX REV CODE- 250/637: Performed by: INTERNAL MEDICINE

## 2020-12-14 PROCEDURE — 99222 1ST HOSP IP/OBS MODERATE 55: CPT | Performed by: HOSPITALIST

## 2020-12-14 PROCEDURE — 87635 SARS-COV-2 COVID-19 AMP PRB: CPT

## 2020-12-14 PROCEDURE — 94640 AIRWAY INHALATION TREATMENT: CPT

## 2020-12-14 PROCEDURE — 71045 X-RAY EXAM CHEST 1 VIEW: CPT

## 2020-12-14 PROCEDURE — 83880 ASSAY OF NATRIURETIC PEPTIDE: CPT

## 2020-12-14 PROCEDURE — 85025 COMPLETE CBC W/AUTO DIFF WBC: CPT

## 2020-12-14 PROCEDURE — 99218 HC RM OBSERVATION: CPT

## 2020-12-14 PROCEDURE — 80048 BASIC METABOLIC PNL TOTAL CA: CPT

## 2020-12-14 PROCEDURE — 65660000000 HC RM CCU STEPDOWN

## 2020-12-14 PROCEDURE — 96376 TX/PRO/DX INJ SAME DRUG ADON: CPT

## 2020-12-14 PROCEDURE — 96374 THER/PROPH/DIAG INJ IV PUSH: CPT

## 2020-12-14 PROCEDURE — 74011250637 HC RX REV CODE- 250/637: Performed by: EMERGENCY MEDICINE

## 2020-12-14 PROCEDURE — 82550 ASSAY OF CK (CPK): CPT

## 2020-12-14 PROCEDURE — 74011250636 HC RX REV CODE- 250/636: Performed by: HOSPITALIST

## 2020-12-14 PROCEDURE — 99285 EMERGENCY DEPT VISIT HI MDM: CPT

## 2020-12-14 PROCEDURE — 74011250636 HC RX REV CODE- 250/636: Performed by: EMERGENCY MEDICINE

## 2020-12-14 PROCEDURE — 2709999900 HC NON-CHARGEABLE SUPPLY

## 2020-12-14 PROCEDURE — 96372 THER/PROPH/DIAG INJ SC/IM: CPT

## 2020-12-14 PROCEDURE — 74011250637 HC RX REV CODE- 250/637: Performed by: HOSPITALIST

## 2020-12-14 RX ORDER — HYDRALAZINE HYDROCHLORIDE 20 MG/ML
10 INJECTION INTRAMUSCULAR; INTRAVENOUS
Status: DISCONTINUED | OUTPATIENT
Start: 2020-12-14 | End: 2020-12-15

## 2020-12-14 RX ORDER — CLONIDINE HYDROCHLORIDE 0.1 MG/1
0.2 TABLET ORAL
Status: COMPLETED | OUTPATIENT
Start: 2020-12-14 | End: 2020-12-14

## 2020-12-14 RX ORDER — SODIUM CHLORIDE 0.9 % (FLUSH) 0.9 %
5-40 SYRINGE (ML) INJECTION EVERY 8 HOURS
Status: DISCONTINUED | OUTPATIENT
Start: 2020-12-14 | End: 2020-12-16 | Stop reason: HOSPADM

## 2020-12-14 RX ORDER — POTASSIUM CHLORIDE 20 MEQ/1
20 TABLET, EXTENDED RELEASE ORAL
Status: COMPLETED | OUTPATIENT
Start: 2020-12-14 | End: 2020-12-14

## 2020-12-14 RX ORDER — IPRATROPIUM BROMIDE AND ALBUTEROL SULFATE 2.5; .5 MG/3ML; MG/3ML
3 SOLUTION RESPIRATORY (INHALATION)
Status: ACTIVE | OUTPATIENT
Start: 2020-12-14 | End: 2020-12-14

## 2020-12-14 RX ORDER — CLONIDINE HYDROCHLORIDE 0.1 MG/1
0.2 TABLET ORAL 3 TIMES DAILY
Status: DISCONTINUED | OUTPATIENT
Start: 2020-12-14 | End: 2020-12-16 | Stop reason: HOSPADM

## 2020-12-14 RX ORDER — POTASSIUM CHLORIDE 20 MEQ/1
40 TABLET, EXTENDED RELEASE ORAL
Status: COMPLETED | OUTPATIENT
Start: 2020-12-14 | End: 2020-12-14

## 2020-12-14 RX ORDER — ENOXAPARIN SODIUM 100 MG/ML
40 INJECTION SUBCUTANEOUS EVERY 24 HOURS
Status: DISCONTINUED | OUTPATIENT
Start: 2020-12-14 | End: 2020-12-16 | Stop reason: HOSPADM

## 2020-12-14 RX ORDER — ACETAMINOPHEN 325 MG/1
650 TABLET ORAL
Status: DISCONTINUED | OUTPATIENT
Start: 2020-12-14 | End: 2020-12-16 | Stop reason: HOSPADM

## 2020-12-14 RX ORDER — SODIUM CHLORIDE 0.9 % (FLUSH) 0.9 %
5-40 SYRINGE (ML) INJECTION AS NEEDED
Status: DISCONTINUED | OUTPATIENT
Start: 2020-12-14 | End: 2020-12-16 | Stop reason: HOSPADM

## 2020-12-14 RX ORDER — ALBUTEROL SULFATE 0.83 MG/ML
2.5 SOLUTION RESPIRATORY (INHALATION)
Status: COMPLETED | OUTPATIENT
Start: 2020-12-14 | End: 2020-12-14

## 2020-12-14 RX ORDER — AMLODIPINE BESYLATE 10 MG/1
10 TABLET ORAL DAILY
Status: DISCONTINUED | OUTPATIENT
Start: 2020-12-15 | End: 2020-12-16 | Stop reason: HOSPADM

## 2020-12-14 RX ORDER — IPRATROPIUM BROMIDE AND ALBUTEROL SULFATE 2.5; .5 MG/3ML; MG/3ML
3 SOLUTION RESPIRATORY (INHALATION)
Status: DISCONTINUED | OUTPATIENT
Start: 2020-12-14 | End: 2020-12-14

## 2020-12-14 RX ORDER — ATORVASTATIN CALCIUM 20 MG/1
20 TABLET, FILM COATED ORAL
Status: DISCONTINUED | OUTPATIENT
Start: 2020-12-14 | End: 2020-12-16 | Stop reason: HOSPADM

## 2020-12-14 RX ORDER — IPRATROPIUM BROMIDE AND ALBUTEROL SULFATE 2.5; .5 MG/3ML; MG/3ML
3 SOLUTION RESPIRATORY (INHALATION)
Status: COMPLETED | OUTPATIENT
Start: 2020-12-14 | End: 2020-12-14

## 2020-12-14 RX ORDER — LISINOPRIL 20 MG/1
20 TABLET ORAL DAILY
Status: DISCONTINUED | OUTPATIENT
Start: 2020-12-15 | End: 2020-12-16 | Stop reason: HOSPADM

## 2020-12-14 RX ORDER — DOCUSATE SODIUM 100 MG/1
100 CAPSULE, LIQUID FILLED ORAL
Status: DISCONTINUED | OUTPATIENT
Start: 2020-12-14 | End: 2020-12-16 | Stop reason: HOSPADM

## 2020-12-14 RX ORDER — MONTELUKAST SODIUM 10 MG/1
10 TABLET ORAL
Status: DISCONTINUED | OUTPATIENT
Start: 2020-12-14 | End: 2020-12-16 | Stop reason: HOSPADM

## 2020-12-14 RX ORDER — FAMOTIDINE 20 MG/1
20 TABLET, FILM COATED ORAL DAILY
Status: DISCONTINUED | OUTPATIENT
Start: 2020-12-15 | End: 2020-12-16 | Stop reason: HOSPADM

## 2020-12-14 RX ORDER — ALPRAZOLAM 0.25 MG/1
0.25 TABLET ORAL
Status: DISCONTINUED | OUTPATIENT
Start: 2020-12-14 | End: 2020-12-15

## 2020-12-14 RX ORDER — CITALOPRAM 20 MG/1
20 TABLET, FILM COATED ORAL
Status: DISCONTINUED | OUTPATIENT
Start: 2020-12-15 | End: 2020-12-16 | Stop reason: HOSPADM

## 2020-12-14 RX ORDER — CLONIDINE HYDROCHLORIDE 0.1 MG/1
0.2 TABLET ORAL 3 TIMES DAILY
Status: DISCONTINUED | OUTPATIENT
Start: 2020-12-14 | End: 2020-12-14

## 2020-12-14 RX ORDER — ASPIRIN 81 MG/1
81 TABLET ORAL DAILY
Status: DISCONTINUED | OUTPATIENT
Start: 2020-12-15 | End: 2020-12-16 | Stop reason: HOSPADM

## 2020-12-14 RX ADMIN — Medication 10 ML: at 22:00

## 2020-12-14 RX ADMIN — ALPRAZOLAM 0.25 MG: 0.25 TABLET ORAL at 21:33

## 2020-12-14 RX ADMIN — POTASSIUM CHLORIDE 40 MEQ: 1500 TABLET, EXTENDED RELEASE ORAL at 05:17

## 2020-12-14 RX ADMIN — METHYLPREDNISOLONE SODIUM SUCCINATE 125 MG: 125 INJECTION, POWDER, FOR SOLUTION INTRAMUSCULAR; INTRAVENOUS at 02:18

## 2020-12-14 RX ADMIN — ALBUTEROL SULFATE 2.5 MG: 2.5 SOLUTION RESPIRATORY (INHALATION) at 06:14

## 2020-12-14 RX ADMIN — IPRATROPIUM BROMIDE AND ALBUTEROL 1 PUFF: 20; 100 SPRAY, METERED RESPIRATORY (INHALATION) at 21:39

## 2020-12-14 RX ADMIN — IPRATROPIUM BROMIDE AND ALBUTEROL SULFATE 3 ML: .5; 3 SOLUTION RESPIRATORY (INHALATION) at 15:33

## 2020-12-14 RX ADMIN — IPRATROPIUM BROMIDE AND ALBUTEROL SULFATE 3 ML: .5; 3 SOLUTION RESPIRATORY (INHALATION) at 05:17

## 2020-12-14 RX ADMIN — CLONIDINE HYDROCHLORIDE 0.2 MG: 0.1 TABLET ORAL at 10:39

## 2020-12-14 RX ADMIN — POTASSIUM CHLORIDE 20 MEQ: 1500 TABLET, EXTENDED RELEASE ORAL at 21:33

## 2020-12-14 RX ADMIN — ENOXAPARIN SODIUM 40 MG: 40 INJECTION SUBCUTANEOUS at 21:34

## 2020-12-14 RX ADMIN — ATORVASTATIN CALCIUM 20 MG: 20 TABLET, FILM COATED ORAL at 21:33

## 2020-12-14 RX ADMIN — METHYLPREDNISOLONE SODIUM SUCCINATE 40 MG: 40 INJECTION, POWDER, FOR SOLUTION INTRAMUSCULAR; INTRAVENOUS at 21:56

## 2020-12-14 RX ADMIN — CLONIDINE HYDROCHLORIDE 0.2 MG: 0.1 TABLET ORAL at 20:27

## 2020-12-14 RX ADMIN — MONTELUKAST SODIUM 10 MG: 10 TABLET, COATED ORAL at 21:56

## 2020-12-14 NOTE — ED TRIAGE NOTES
Assumed care of pt   Pt arrived via EMS  Introduced self to pt  Updated white board and explained usage  Pt alert and oriented x 4   C/o SOB that was getting worse   Pt is well know to this facility   Pt has hx of COPD and renal issues   Pt placed on the monitor   Patient NSR   - jugular ditenion  Pt lung sounds clear   Pt breathing labored but equal   Pt denied chest pain   Pt is using accessory muscles to brethe  Pt has a dry painful cough   SpO2 100 on NC 3 L  Updated about plan of care   Pt laying in bed with call bell in reach   Will continue to monitor and assess

## 2020-12-14 NOTE — ROUTINE PROCESS
TRANSFER - IN REPORT: 
 
Verbal report received from [facility]) on [facility]  being received from [facility](unit) for routine progression of care Report consisted of patients Situation, Background, Assessment and  
Recommendations(SBAR). Information from the following report(s) Recent Results was reviewed with the receiving nurse. Opportunity for questions and clarification was provided. Received report for night shift nurse to assume care upon her arrival-- a report will be given to night shift RN from the information received Bedside and Verbal shift change report given to Sanket Mike (oncoming nurse) by Silvio Suh RN (offgoing nurse). Report given with SBAR, Kardex, Intake/Output, MAR, Accordion and Recent Results.

## 2020-12-14 NOTE — ED PROVIDER NOTES
Pt c/o sob, says h/o copd and feels like having an exacerbation. Last was 1-2 weeks ago per pt. Says seen at  ed. Given steroids. Sx's never improved. C/o chronic chest pain w sim episodes, no change, imld mid chest pain. No cough. No abd pain or nausea. No leg pain or swelling. No weakness denies curr smoking. Past Medical History:   Diagnosis Date    CHF (congestive heart failure) ([facility])     Chronic respiratory failure with hypoxia ([address] 9/7/2020    CKD (chronic kidney disease) stage 3, GFR 30-59 ml/min 10/31/2019    Cocaine abuse ([address] 11/26/2017    COPD (chronic obstructive pulmonary disease) with chronic bronchitis ([address] 12/19/2017    Dependence on supplemental oxygen     Depression 3/22/2020    Drug-seeking behavior 11/19/2016    Endocrine disease     thyroid issues    Essential hypertension 3/10/2017    Fe deficiency anemia 10/27/2012    Fibromyalgia 12/16/2016    Gastroesophageal reflux disease without esophagitis 10/10/2016    Gastrointestinal disorder     \"blockage in my stomach\"    Hypercholesterolemia     Hypertension     Hypertensive heart failure ([address] 12/19/2017    Morbid obesity due to excess calories ([address] 3/10/2017    NSTEMI (non-ST elevated myocardial infarction) ([address] 3/22/2020    On home O2 12/3/2015    Overview:  2L at home per pt for approx 8 years    Polysubstance abuse ([address] 9/15/2018    Respiratory failure ([address] 1/11/2017    S/P hernia repair 10/31/2019    Sleep apnea 12/19/2017    T wave inversion in EKG 3/9/2019    Tobacco use 4/01/7846    Uncomplicated severe persistent asthma 12/13/2016    Vocal cord disease 12/7/2016       Past Surgical History:   Procedure Laterality Date    HX GI      Exploratory laparotomy with lysis of adhesions and primary repair of incarcerated umbilical hernia         No family history on file.     Social History     Socioeconomic History    Marital status: SINGLE     Spouse name: Not on file    Number of children: Not on file    Years of education: Not on file    Highest education level: Not on file   Occupational History    Not on file   Social Needs    Financial resource strain: Not on file    Food insecurity     Worry: Not on file     Inability: Not on file    Transportation needs     Medical: Not on file     Non-medical: Not on file   Tobacco Use    Smoking status: Former Smoker     Packs/day: 0.25    Smokeless tobacco: Current User    Tobacco comment: Pt states she has not had money to buy cigarettes in the past month   Substance and Sexual Activity    Alcohol use: No     Comment: socially    Drug use: Not Currently     Types: Heroin     Comment: pt reports using approximately a month ago    Sexual activity: Not Currently     Comment: last used 9 years ago   Lifestyle    Physical activity     Days per week: Not on file     Minutes per session: Not on file    Stress: Not on file   Relationships    Social connections     Talks on phone: Not on file     Gets together: Not on file     Attends Gnosticist service: Not on file     Active member of club or organization: Not on file     Attends meetings of clubs or organizations: Not on file     Relationship status: Not on file    Intimate partner violence     Fear of current or ex partner: Not on file     Emotionally abused: Not on file     Physically abused: Not on file     Forced sexual activity: Not on file   Other Topics Concern    Not on file   Social History Narrative    Not on file         ALLERGIES: Patient has no known allergies. Review of Systems   Constitutional: Negative for fever. HENT: Negative for congestion. Respiratory: Positive for shortness of breath. Cardiovascular: Positive for chest pain. Gastrointestinal: Negative for abdominal pain and vomiting. Musculoskeletal: Negative for back pain. Skin: Negative for rash. Neurological: Negative for light-headedness. All other systems reviewed and are negative.       Vitals: 12/14/20 0700 12/14/20 0800 12/14/20 1620 12/14/20 1830   BP: (!) 159/78 (!) 177/84 (!) 177/84 (!) 174/94   Pulse: 86 87  84   Resp: 22 23  24   Temp:    97.8 °F (36.6 °C)   SpO2: 98% 98% 97% 97%   Weight:       Height:                Physical Exam  Vitals signs and nursing note reviewed. Constitutional:       Appearance: She is well-developed. She is not diaphoretic. HENT:      Head: Normocephalic and atraumatic. Eyes:      Pupils: Pupils are equal, round, and reactive to light. Neck:      Musculoskeletal: Normal range of motion. Cardiovascular:      Rate and Rhythm: Normal rate and regular rhythm. Heart sounds: No murmur. Pulmonary:      Breath sounds: Wheezing present. Comments: Mild tachypnia  Abdominal:      Palpations: Abdomen is soft. Tenderness: There is no abdominal tenderness. Musculoskeletal:         General: No tenderness. Skin:     General: Skin is dry. Capillary Refill: Capillary refill takes less than 2 seconds. Findings: No rash. Neurological:      Mental Status: She is alert and oriented to person, place, and time.           MDM       Procedures    Vitals:  Patient Vitals for the past 12 hrs:   Temp Pulse Resp BP SpO2   12/14/20 1830 97.8 °F (36.6 °C) 84 24 (!) 174/94 97 %   12/14/20 1620    (!) 177/84 97 %   12/14/20 0800  87 23 (!) 177/84 98 %         Medications ordered:   Medications   albuterol-ipratropium (DUO-NEB) 2.5 MG-0.5 MG/3 ML (3 mL Nebulization Refused 12/14/20 0218)   albuterol-ipratropium (DUO-NEB) 2.5 MG-0.5 MG/3 ML (3 mL Nebulization Given 12/14/20 1533)   amLODIPine (NORVASC) tablet 10 mg (has no administration in time range)   aspirin delayed-release tablet 81 mg (has no administration in time range)   atorvastatin (LIPITOR) tablet 20 mg (has no administration in time range)   citalopram (CELEXA) tablet 20 mg (has no administration in time range)   famotidine (PEPCID) tablet 20 mg (has no administration in time range)   lisinopriL (PRINIVIL, ZESTRIL) tablet 20 mg (has no administration in time range)   montelukast (SINGULAIR) tablet 10 mg (has no administration in time range)   roflumilast (DALIRESP) tablet 500 mcg (has no administration in time range)   sodium chloride (NS) flush 5-40 mL (has no administration in time range)   sodium chloride (NS) flush 5-40 mL (has no administration in time range)   acetaminophen (TYLENOL) tablet 650 mg (has no administration in time range)   docusate sodium (COLACE) capsule 100 mg (has no administration in time range)   enoxaparin (LOVENOX) injection 40 mg (has no administration in time range)   methylPREDNISolone (PF) (Solu-MEDROL) injection 40 mg (has no administration in time range)   albuterol-ipratropium (DUO-NEB) 2.5 MG-0.5 MG/3 ML (has no administration in time range)   albuterol-ipratropium (DUO-NEB) 2.5 MG-0.5 MG/3 ML (has no administration in time range)   cloNIDine HCL (CATAPRES) tablet 0.2 mg (has no administration in time range)   hydrALAZINE (APRESOLINE) 20 mg/mL injection 10 mg (has no administration in time range)   potassium chloride (K-DUR, KLOR-CON) SR tablet 20 mEq (has no administration in time range)   methylPREDNISolone (PF) (Solu-MEDROL) injection 125 mg (125 mg IntraVENous Given 12/14/20 0218)   potassium chloride (K-DUR, KLOR-CON) SR tablet 40 mEq (40 mEq Oral Given 12/14/20 0517)   albuterol-ipratropium (DUO-NEB) 2.5 MG-0.5 MG/3 ML (3 mL Nebulization Given 12/14/20 0517)   albuterol (PROVENTIL VENTOLIN) nebulizer solution 2.5 mg (2.5 mg Nebulization Given 12/14/20 0614)   cloNIDine HCL (CATAPRES) tablet 0.2 mg (0.2 mg Oral Given 12/14/20 1039)         Lab findings:  No results found for this or any previous visit (from the past 12 hour(s)). X-Ray, CT or other radiology findings or impressions:  XR CHEST PORT   Final Result   IMPRESSION:      No acute abnormalities. Progress notes, Consult notes or additional Procedure notes:   5:39 AM pt says continued wheezing. Cont wheezing on exam. Although sats wnl. Pt req admit, to d/w hospitalist  5:52 AM d/w dr Teresa Anderson, to observe      Diagnosis:   1. COPD exacerbation (HCC)        Disposition: home    Follow-up Information    None          Current Discharge Medication List      CONTINUE these medications which have NOT CHANGED    Details   albuterol (ACCUNEB) 1.25 mg/3 mL nebu Take 3 mL by inhalation every four (4) hours as needed for Wheezing (wheezing). Qty: 25 Each, Refills: 0      albuterol (PROVENTIL VENTOLIN) 2.5 mg /3 mL (0.083 %) nebu 3 mL by Nebulization route every six (6) hours as needed for Wheezing, Shortness of Breath or Respiratory Distress. Qty: 30 Nebule, Refills: 0      citalopram (CeleXA) 20 mg tablet Take 1 Tab by mouth every morning. Qty: 30 Tab, Refills: 0      fluticasone furoate-vilanteroL (Breo Ellipta) 200-25 mcg/dose inhaler Take 1 Puff by inhalation daily. Qty: 1 Inhaler, Refills: 0      roflumilast (DALIRESP) 500 mcg tab tablet Take 1 Tab by mouth daily. Qty: 30 Tab, Refills: 0      montelukast (SINGULAIR) 10 mg tablet Take 1 Tab by mouth nightly. Indications: controller medication for asthma  Qty: 30 Tab, Refills: 1      predniSONE (DELTASONE) 50 mg tablet Take 1 Tab by mouth daily. Qty: 7 Tab, Refills: 0      cloNIDine HCL (CATAPRES) 0.2 mg tablet Take 0.2 mg by mouth three (3) times daily. lisinopriL (PRINIVIL, ZESTRIL) 20 mg tablet Take 20 mg by mouth daily. amLODIPine (NORVASC) 10 mg tablet Take 10 mg by mouth daily. albuterol (PROVENTIL HFA, VENTOLIN HFA, PROAIR HFA) 90 mcg/actuation inhaler Take 2 Puffs by inhalation every four (4) hours as needed for Wheezing. Qty: 1 Inhaler, Refills: 0      nitroglycerin (NITROSTAT) 0.4 mg SL tablet Take 1 Tab by mouth every five (5) minutes as needed for Chest Pain.  Sit/Lay down then put one tab under the tongue every 5 minutes as needed for chest pain for 3 doses  Qty: 1 Bottle, Refills: 0      aspirin delayed-release 81 mg tablet Take 1 Tab by mouth daily. Qty: 30 Tab, Refills: 0      famotidine (PEPCID) 20 mg tablet Take 1 Tab by mouth daily. Qty: 30 Tab, Refills: 1      OXYGEN-AIR DELIVERY SYSTEMS 3 L by IntraNASal route as needed for Other (sob). atorvastatin (LIPITOR) 20 mg tablet Take 1 Tab by mouth nightly. Qty: 30 Tab, Refills: 0      naloxone (NARCAN) 4 mg/actuation nasal spray Use 1 spray intranasally, then discard. Repeat with new spray every 2 min as needed for opioid overdose symptoms, alternating nostrils.   Qty: 1 Each, Refills: 0

## 2020-12-14 NOTE — ED NOTES
Patient ambulated to restroom with assistance. Patient with dyspnea from exertion and stating \"I dont feel any better\". Patient requesting to be admitted. MD Mima Hickey made aware.

## 2020-12-14 NOTE — ED NOTES
Bedside shift change report given to [address] [place] [address] (oncoming nurse) by Sri Ibanez RN (offgoing nurse). Report included the following information SBAR.

## 2020-12-14 NOTE — PROGRESS NOTES
Hospitalist Accept Note    Patient is admitted to [facility]. We are providing transition coverage until they can transfer to [facility]. Nursing calls today will be answered by DARCIE Borges until 7:00 pm  She can be reached by Perfect serve or by Pager. Her pager number is [phone]. After 7 pm, Tramaine Buchanan will answer nursing calls. Dr Juan Martines is available by Perfect Serve and by pager as well. Pager number is [phone]. This pager will not be active before 7:00 pm.    On arrival to [facility], the patient will be followed by the [facility] Hospitalist Team.  Call the admitting physician for assistance with discerning which MD will assist.    In the event that we need to troubleshoot, Dr Blank Vo cell phone is [phone]. We are officially following the patient at this point. H+P is coming shortly.

## 2020-12-14 NOTE — ED NOTES
Patient transitioned to hospital bed. Patient updated on plan of care. Vitals stable on cardiac monitor. No apparent distress noted.

## 2020-12-15 LAB
ANION GAP SERPL CALC-SCNC: 7 MMOL/L (ref 3–18)
BASOPHILS # BLD: 0 K/UL (ref 0–0.1)
BASOPHILS NFR BLD: 0 % (ref 0–2)
BUN SERPL-MCNC: 20 MG/DL (ref 7–18)
BUN/CREAT SERPL: 19 (ref 12–20)
CALCIUM SERPL-MCNC: 8.6 MG/DL (ref 8.5–10.1)
CHLORIDE SERPL-SCNC: 108 MMOL/L (ref 100–111)
CO2 SERPL-SCNC: 27 MMOL/L (ref 21–32)
CREAT SERPL-MCNC: 1.07 MG/DL (ref 0.6–1.3)
DIFFERENTIAL METHOD BLD: ABNORMAL
EOSINOPHIL # BLD: 0 K/UL (ref 0–0.4)
EOSINOPHIL NFR BLD: 0 % (ref 0–5)
ERYTHROCYTE [DISTWIDTH] IN BLOOD BY AUTOMATED COUNT: 12.8 % (ref 11.6–14.5)
GLUCOSE SERPL-MCNC: 118 MG/DL (ref 74–99)
HCT VFR BLD AUTO: 37.9 % (ref 35–45)
HGB BLD-MCNC: 12.2 G/DL (ref 12–16)
LYMPHOCYTES # BLD: 0.7 K/UL (ref 0.9–3.6)
LYMPHOCYTES NFR BLD: 6 % (ref 21–52)
MAGNESIUM SERPL-MCNC: 1.7 MG/DL (ref 1.6–2.6)
MCH RBC QN AUTO: 29.3 PG (ref 24–34)
MCHC RBC AUTO-ENTMCNC: 32.2 G/DL (ref 31–37)
MCV RBC AUTO: 90.9 FL (ref 74–97)
MONOCYTES # BLD: 0.2 K/UL (ref 0.05–1.2)
MONOCYTES NFR BLD: 2 % (ref 3–10)
NEUTS SEG # BLD: 10.8 K/UL (ref 1.8–8)
NEUTS SEG NFR BLD: 92 % (ref 40–73)
PLATELET # BLD AUTO: 231 K/UL (ref 135–420)
PMV BLD AUTO: 11.6 FL (ref 9.2–11.8)
POTASSIUM SERPL-SCNC: 4.2 MMOL/L (ref 3.5–5.5)
RBC # BLD AUTO: 4.17 M/UL (ref 4.2–5.3)
SODIUM SERPL-SCNC: 142 MMOL/L (ref 136–145)
WBC # BLD AUTO: 11.7 K/UL (ref 4.6–13.2)

## 2020-12-15 PROCEDURE — 65660000000 HC RM CCU STEPDOWN

## 2020-12-15 PROCEDURE — 85025 COMPLETE CBC W/AUTO DIFF WBC: CPT

## 2020-12-15 PROCEDURE — 74011250637 HC RX REV CODE- 250/637: Performed by: HOSPITALIST

## 2020-12-15 PROCEDURE — 97161 PT EVAL LOW COMPLEX 20 MIN: CPT

## 2020-12-15 PROCEDURE — 74011000250 HC RX REV CODE- 250: Performed by: HOSPITALIST

## 2020-12-15 PROCEDURE — 74011250636 HC RX REV CODE- 250/636: Performed by: HOSPITALIST

## 2020-12-15 PROCEDURE — 99218 HC RM OBSERVATION: CPT

## 2020-12-15 PROCEDURE — 80048 BASIC METABOLIC PNL TOTAL CA: CPT

## 2020-12-15 PROCEDURE — 96376 TX/PRO/DX INJ SAME DRUG ADON: CPT

## 2020-12-15 PROCEDURE — 36415 COLL VENOUS BLD VENIPUNCTURE: CPT

## 2020-12-15 PROCEDURE — 94640 AIRWAY INHALATION TREATMENT: CPT

## 2020-12-15 PROCEDURE — 99232 SBSQ HOSP IP/OBS MODERATE 35: CPT | Performed by: HOSPITALIST

## 2020-12-15 PROCEDURE — 83735 ASSAY OF MAGNESIUM: CPT

## 2020-12-15 PROCEDURE — 2709999900 HC NON-CHARGEABLE SUPPLY

## 2020-12-15 RX ORDER — MAGNESIUM SULFATE 100 %
16 CRYSTALS MISCELLANEOUS AS NEEDED
Status: DISCONTINUED | OUTPATIENT
Start: 2020-12-15 | End: 2020-12-16 | Stop reason: HOSPADM

## 2020-12-15 RX ORDER — PREDNISONE 20 MG/1
40 TABLET ORAL
Status: DISCONTINUED | OUTPATIENT
Start: 2020-12-16 | End: 2020-12-16 | Stop reason: HOSPADM

## 2020-12-15 RX ORDER — IPRATROPIUM BROMIDE AND ALBUTEROL SULFATE 2.5; .5 MG/3ML; MG/3ML
3 SOLUTION RESPIRATORY (INHALATION)
Status: DISCONTINUED | OUTPATIENT
Start: 2020-12-15 | End: 2020-12-16 | Stop reason: HOSPADM

## 2020-12-15 RX ORDER — METHADONE HYDROCHLORIDE 10 MG/ML
45 CONCENTRATE ORAL DAILY
Status: DISCONTINUED | OUTPATIENT
Start: 2020-12-15 | End: 2020-12-16 | Stop reason: HOSPADM

## 2020-12-15 RX ORDER — DEXTROSE 50 % IN WATER (D50W) INTRAVENOUS SYRINGE
25-50 AS NEEDED
Status: DISCONTINUED | OUTPATIENT
Start: 2020-12-15 | End: 2020-12-16 | Stop reason: HOSPADM

## 2020-12-15 RX ORDER — INSULIN LISPRO 100 [IU]/ML
INJECTION, SOLUTION INTRAVENOUS; SUBCUTANEOUS
Status: DISCONTINUED | OUTPATIENT
Start: 2020-12-15 | End: 2020-12-16 | Stop reason: HOSPADM

## 2020-12-15 RX ADMIN — CITALOPRAM HYDROBROMIDE 20 MG: 20 TABLET ORAL at 06:40

## 2020-12-15 RX ADMIN — IPRATROPIUM BROMIDE AND ALBUTEROL 1 PUFF: 20; 100 SPRAY, METERED RESPIRATORY (INHALATION) at 16:01

## 2020-12-15 RX ADMIN — MONTELUKAST SODIUM 10 MG: 10 TABLET, COATED ORAL at 21:35

## 2020-12-15 RX ADMIN — Medication 10 ML: at 06:42

## 2020-12-15 RX ADMIN — METHYLPREDNISOLONE SODIUM SUCCINATE 40 MG: 40 INJECTION, POWDER, FOR SOLUTION INTRAMUSCULAR; INTRAVENOUS at 14:19

## 2020-12-15 RX ADMIN — IPRATROPIUM BROMIDE AND ALBUTEROL SULFATE 3 ML: .5; 3 SOLUTION RESPIRATORY (INHALATION) at 16:02

## 2020-12-15 RX ADMIN — IPRATROPIUM BROMIDE AND ALBUTEROL 1 PUFF: 20; 100 SPRAY, METERED RESPIRATORY (INHALATION) at 20:37

## 2020-12-15 RX ADMIN — IPRATROPIUM BROMIDE AND ALBUTEROL 1 PUFF: 20; 100 SPRAY, METERED RESPIRATORY (INHALATION) at 08:23

## 2020-12-15 RX ADMIN — Medication 10 ML: at 21:36

## 2020-12-15 RX ADMIN — Medication 10 ML: at 14:14

## 2020-12-15 RX ADMIN — METHYLPREDNISOLONE SODIUM SUCCINATE 40 MG: 40 INJECTION, POWDER, FOR SOLUTION INTRAMUSCULAR; INTRAVENOUS at 21:36

## 2020-12-15 RX ADMIN — METHYLPREDNISOLONE SODIUM SUCCINATE 40 MG: 40 INJECTION, POWDER, FOR SOLUTION INTRAMUSCULAR; INTRAVENOUS at 06:41

## 2020-12-15 RX ADMIN — IPRATROPIUM BROMIDE AND ALBUTEROL 1 PUFF: 20; 100 SPRAY, METERED RESPIRATORY (INHALATION) at 14:20

## 2020-12-15 RX ADMIN — LISINOPRIL 20 MG: 20 TABLET ORAL at 08:23

## 2020-12-15 RX ADMIN — METHADONE HYDROCHLORIDE 45 MG: 10 CONCENTRATE ORAL at 13:21

## 2020-12-15 RX ADMIN — AMLODIPINE BESYLATE 10 MG: 10 TABLET ORAL at 08:22

## 2020-12-15 RX ADMIN — Medication 81 MG: at 08:23

## 2020-12-15 RX ADMIN — CLONIDINE HYDROCHLORIDE 0.2 MG: 0.1 TABLET ORAL at 21:35

## 2020-12-15 RX ADMIN — CLONIDINE HYDROCHLORIDE 0.2 MG: 0.1 TABLET ORAL at 16:01

## 2020-12-15 RX ADMIN — CLONIDINE HYDROCHLORIDE 0.2 MG: 0.1 TABLET ORAL at 08:23

## 2020-12-15 RX ADMIN — IPRATROPIUM BROMIDE AND ALBUTEROL 1 PUFF: 20; 100 SPRAY, METERED RESPIRATORY (INHALATION) at 01:00

## 2020-12-15 RX ADMIN — ATORVASTATIN CALCIUM 20 MG: 20 TABLET, FILM COATED ORAL at 21:35

## 2020-12-15 RX ADMIN — ROFLUMILAST 500 MCG: 500 TABLET ORAL at 09:00

## 2020-12-15 NOTE — PROGRESS NOTES
Had a conversation with the patient and per the patient the reason why she often goes AMA is because she feels as if no one ever listens to her. Per pt she feel as if no one is really doing anything to help her condition and help her get well. Pt also stated tht she takes methadone and hasn't had it this admission. Pt also wanted to have her diet change from diabetic to regular.   Advised patient tht will follow up with provider and advise

## 2020-12-15 NOTE — PROGRESS NOTES
Reason for Admission:   COPD exacerbation ([facility] [J44.1]  COPD exacerbation ([facility] [J44.1]               RUR Score:     95             Resources/supports as identified by patient/family:       Top Challenges facing patient (as identified by patient/family and CM): Finances/Medication cost?     No needs identified  Transportation      medicaid  Support system or lack thereof?   family  Living arrangements? Lives alone   Self-care/ADLs/Cognition?   independent        Current Advanced Directive/Advance Care Plan:   no                          Plan for utilizing home health:    yes                      Likelihood of readmission:   HIGH    Transition of Care Plan:                    Initial assessment completed with patient. Cognitive status of patient: oriented to time, place, person and situation. Face sheet information confirmed:  yes. The patient designates Daughter to participate in her discharge plan and to receive any needed information. This patient lives in a single family home with patient. Patient is able to navigate steps as needed. Prior to hospitalization, patient was considered to be independent with ADLs/IADLS : yes . Patient has a current ACP document on file: no  The patient will be available to transport patient home upon discharge. The patient already has none reported,  medical equipment available in the home. Patient is not currently active with home health. Patient has not stayed in a skilled nursing facility or rehab. This patient is on dialysis :no    List of available Home Health agencies were provided and reviewed with the patient prior to discharge. Freedom of choice signed: yes, for [employer]. Currently, the discharge plan is Home with [address] [place] [address] Mukesh Arce. The patient states that she can obtain her medications from the pharmacy, and take her medications as directed. Patient's current insurance is Medicaid.       Care Management Interventions  PCP Verified by CM:  Yes  Mode of Transport at Discharge: Self  Current Support Network: Lives Alone  Confirm Follow Up Transport: Friends  The Plan for Transition of Care is Related to the Following Treatment Goals : Home health  The Patient and/or Patient Representative was Provided with a Choice of Provider and Agrees with the Discharge Plan?: Yes  Freedom of Choice List was Provided with Basic Dialogue that Supports the Patient's Individualized Plan of Care/Goals, Treatment Preferences and Shares the Quality Data Associated with the Providers?: Yes  Discharge Location  Discharge Placement: Home with home health        Mike Guardado RN BSN  Care Manager  [phone]

## 2020-12-15 NOTE — PROGRESS NOTES
Comprehensive Nutrition Assessment    Type and Reason for Visit: Initial, Positive nutrition screen    Nutrition Recommendations/Plan:   - Modify diet; change to Regular diet to encourage meal intake  - Monitor BG levels     Nutrition Assessment:  Pt reported good appetite and meal intake PTA. Good appetite currently, but fair meal intake due to dislike of being on diabetic diet; she denies hx of DM. Discussed modifying diet pending BG levels (if stable and controlled); pt agreed with plan. Noted pt on steroid therapy, but BG levels controlled. Malnutrition Assessment:  Malnutrition Status:  No malnutrition      Nutrition History and Allergies: Past medical hx: CHF, CKD stage III, cocaine abuse, COPD, depression, drug-seeking behavior, endocrine (thyroid) issues, HTN, Iron deficiency anemia, GERD without esophagitis, GI disorder, hypercholesterolemia, hypertensive heart failure, s/p hernia repair. Pt reported good appetite and meal intake PTA. Wt loss of 92 lb, 31% from 2/2016 to 10/2019 (3 years); then further wt loss of 39 lb, 19% from 10/2019 to 12/2020 (14 month PTA); pt reported wt loss was mostly due to fluid losses, very little due to body weight. Pt not concerned with the wt loss. No known food allergies     Estimated Daily Nutrient Needs:  Energy (kcal): 8152-9477; Weight Used for Energy Requirements: Current(75.3 kg)  Protein (g): 75-90; Weight Used for Protein Requirements: Current(x1-1.2)  Fluid (ml/day): 2338-5477; Method Used for Fluid Requirements: 1 ml/kcal      Nutrition Related Findings:  BM 12/13. no edema. meds: steroid. BG levels,  118-120 mg/dL in last 24-48 hours. missing some teeth, but reported tolerating diet consistency      Wounds:    None       Current Nutrition Therapies:  DIET DIABETIC WITH OPTIONS Consistent Carb 2000kcal; Regular; No Conc.  Sweets    Anthropometric Measures:  · Height:  5' 3\" (160 cm)  · Current Body Wt:  75.3 kg (166 lb 0.1 oz) · Admission Body Wt:  160 lb 0.9 oz(pt stated)    · Ideal Body Wt:  115 lbs:  144.4 %   · Adjusted Body Weight:   ; Weight Adjustment for: No adjustment   · BMI Category: Overweight (BMI 25.0-29. 9)       Nutrition Diagnosis:   · Inadequate oral intake related to (dislike of diabetic diet (noted no hx of DM and BG levels currently controlled)) as evidenced by intake 51-75%(since admission)      Nutrition Interventions:   Food and/or Nutrient Delivery: Modify current diet  Nutrition Education and Counseling: Education not indicated  Coordination of Nutrition Care: Continue to monitor while inpatient    Goals:  PO nutrition intake will meet >75% of patient's estimated nutrition needs within the next 7 days       Nutrition Monitoring and Evaluation:   Behavioral-Environmental Outcomes: None identified  Food/Nutrient Intake Outcomes: Food and nutrient intake  Physical Signs/Symptoms Outcomes: Biochemical data, Meal time behavior, Nutrition focused physical findings    Discharge Planning:    No discharge needs at this time     Electronically signed by Idania Crowell, [address] [place] [address] on 12/15/2020 at 2:32 PM    Contact: [phone]

## 2020-12-15 NOTE — H&P
History & Physical    Patient: Jennie Rivera MRN: [id number]  CSN: [id number]    YOB: 1965  Age: 63 y.o. Sex: female      DOA: 12/14/2020    Chief Complaint:   Chief Complaint   Patient presents with    Shortness of Breath          HPI:     Jennie Rivera is a 63 y.o.  female  With a past medical history of chronic respiratory failure, COPD, hypertension comes to the emergency room complaining of shortness of breath. Patient states she has been having exacerbation of COPD which is not improved with her home inhalers and nebulizers. In emergency room patient was noted bilateral wheezing, patient was started on IV steroids and bronchodilators and was admitted to hospital.  Patient denies any sick contact, no exposure to COVID-19. Patient admits having cough with minimal sputum production. Patient states her shortness of breath is chronic but slightly got worse last couple of days. Denies any smoking now. Denies any chest pain, no abdominal pain.     Past Medical History:   Diagnosis Date    CHF (congestive heart failure) (HCC)     Chronic respiratory failure with hypoxia ([address] 9/7/2020    CKD (chronic kidney disease) stage 3, GFR 30-59 ml/min 10/31/2019    Cocaine abuse ([address] 11/26/2017    COPD (chronic obstructive pulmonary disease) with chronic bronchitis ([address] 12/19/2017    Dependence on supplemental oxygen     Depression 3/22/2020    Drug-seeking behavior 11/19/2016    Endocrine disease     thyroid issues    Essential hypertension 3/10/2017    Fe deficiency anemia 10/27/2012    Fibromyalgia 12/16/2016    Gastroesophageal reflux disease without esophagitis 10/10/2016    Gastrointestinal disorder     \"blockage in my stomach\"    Hypercholesterolemia     Hypertension     Hypertensive heart failure ([address] 12/19/2017    Morbid obesity due to excess calories ([address] 3/10/2017    NSTEMI (non-ST elevated myocardial infarction) ([address] 3/22/2020    On home O2 12/3/2015    Overview:  2L at home per pt for approx 8 years    Polysubstance abuse ([facility] [address] 9/15/2018    Respiratory failure ([facility] [address] 1/11/2017    S/P hernia repair 10/31/2019    Sleep apnea 12/19/2017    T wave inversion in EKG 3/9/2019    Tobacco use 7/22/4157    Uncomplicated severe persistent asthma 12/13/2016    Vocal cord disease 12/7/2016       Past Surgical History:   Procedure Laterality Date    HX GI      Exploratory laparotomy with lysis of adhesions and primary repair of incarcerated umbilical hernia       No family history on file. Social History     Socioeconomic History    Marital status: SINGLE     Spouse name: Not on file    Number of children: Not on file    Years of education: Not on file    Highest education level: Not on file   Tobacco Use    Smoking status: Former Smoker     Packs/day: 0.25    Smokeless tobacco: Current User    Tobacco comment: Pt states she has not had money to buy cigarettes in the past month   Substance and Sexual Activity    Alcohol use: No     Comment: socially    Drug use: Not Currently     Types: Heroin     Comment: pt reports using approximately a month ago    Sexual activity: Not Currently     Comment: last used 9 years ago       Prior to Admission medications    Medication Sig Start Date End Date Taking? Authorizing Provider   albuterol (ACCUNEB) 1.25 mg/3 mL nebu Take 3 mL by inhalation every four (4) hours as needed for Wheezing (wheezing). 12/5/20   Anna Sharpe,    albuterol (PROVENTIL VENTOLIN) 2.5 mg /3 mL (0.083 %) nebu 3 mL by Nebulization route every six (6) hours as needed for Wheezing, Shortness of Breath or Respiratory Distress. 11/30/20   Jamel Gregory MD   citalopram (CeleXA) 20 mg tablet Take 1 Tab by mouth every morning. 11/30/20   Jamel Gregory MD   fluticasone furoate-vilanteroL (Breo Ellipta) 200-25 mcg/dose inhaler Take 1 Puff by inhalation daily.  11/30/20   Jamel Gregory MD   roflumilast (DALIRESP) 500 mcg tab tablet Take 1 Tab by mouth daily. 11/30/20   Janet Arcos MD   montelukast (SINGULAIR) 10 mg tablet Take 1 Tab by mouth nightly. Indications: controller medication for asthma 11/30/20   Janet Arcos MD   predniSONE (DELTASONE) 50 mg tablet Take 1 Tab by mouth daily. 11/30/20   Janet Arcos MD   cloNIDine HCL (CATAPRES) 0.2 mg tablet Take 0.2 mg by mouth three (3) times daily. 10/1/20   Provider, Historical   lisinopriL (PRINIVIL, ZESTRIL) 20 mg tablet Take 20 mg by mouth daily. 10/1/20   Provider, Historical   amLODIPine (NORVASC) 10 mg tablet Take 10 mg by mouth daily. 10/1/20   Provider, Historical   albuterol (PROVENTIL HFA, VENTOLIN HFA, PROAIR HFA) 90 mcg/actuation inhaler Take 2 Puffs by inhalation every four (4) hours as needed for Wheezing. 11/17/20   Marium Randolph MD   nitroglycerin (NITROSTAT) 0.4 mg SL tablet Take 1 Tab by mouth every five (5) minutes as needed for Chest Pain. Sit/Lay down then put one tab under the tongue every 5 minutes as needed for chest pain for 3 doses  Patient taking differently: Take 0.4 mg by mouth every five (5) minutes as needed for Chest Pain. Sit/Lay down then put one tab under the tongue every 5 minutes as needed for chest pain for 3 doses    Pt states she is out of this medication 10/19/20   Nancy Torres MD   aspirin delayed-release 81 mg tablet Take 1 Tab by mouth daily. 3/27/20   Brian Preston MD   famotidine (PEPCID) 20 mg tablet Take 1 Tab by mouth daily. Patient taking differently: Take 20 mg by mouth daily as needed. 3/26/20   Brian Preston MD   OXYGEN-AIR DELIVERY SYSTEMS 3 L by IntraNASal route as needed for Other (sob). Provider, Historical   atorvastatin (LIPITOR) 20 mg tablet Take 1 Tab by mouth nightly. 3/14/19   Brianne Jurado MD   naloxone [facility]) 4 mg/actuation nasal spray Use 1 spray intranasally, then discard. Repeat with new spray every 2 min as needed for opioid overdose symptoms, alternating nostrils.  3/14/19   Brianne Jurado MD       No Known Allergies      Review of Systems  GENERAL: Patient alert, awake and oriented times 3, able to communicate full sentences and not in distress. HEENT: No change in vision, no earache, tinnitus, sore throat or sinus congestion. NECK: No pain or stiffness. PULMONARY: Positive for shortness of breath, cough and wheezing. .   Cardiovascular: no pnd or orthopnea, no CP  GASTROINTESTINAL: No abdominal pain, nausea, vomiting or diarrhea, melena or bright red blood per rectum. GENITOURINARY: No urinary frequency, urgency, hesitancy or dysuria. MUSCULOSKELETAL: No joint or muscle pain, no back pain, no recent trauma. DERMATOLOGIC: No rash, no itching, no lesions. ENDOCRINE: No polyuria, polydipsia, no heat or cold intolerance. No recent change in weight. HEMATOLOGICAL: No anemia or easy bruising or bleeding. NEUROLOGIC: No headache, seizures, numbness, tingling or weakness. Physical Exam:     Physical Exam:  Visit Vitals  BP (!) 174/94   Pulse 84   Temp 97.8 °F (36.6 °C)   Resp 24   Ht 5' 3\" (1.6 m)   Wt 72.6 kg (160 lb)   LMP 2009   SpO2 97%   BMI 28.34 kg/m²    O2 Flow Rate (L/min): 3 l/min O2 Device: Room air    Temp (24hrs), Av.2 °F (36.8 °C), Min:97.8 °F (36.6 °C), Max:98.6 °F (37 °C)    No intake/output data recorded.  0701 -  1900  In: 360 [P.O.:360]  Out: -     General:  Alert, cooperative, no distress, appears stated age. Head: Normocephalic, without obvious abnormality, atraumatic. Eyes:  Conjunctivae/corneas clear. PERRL, EOMs intact. Nose: Nares normal. No drainage or sinus tenderness. Neck: Supple, symmetrical, trachea midline, no adenopathy, thyroid: no enlargement, no carotid bruit and no JVD. Lungs:    bilateral expiratory wheezing, no Rales or rhonchi. No accessory muscle use. Heart:  Regular rate and rhythm, S1, S2 normal.     Abdomen: Soft, non-tender.  Bowel sounds normal.    Extremities: Extremities normal, atraumatic, no cyanosis or edema.   Pulses: 2+ and symmetric all extremities. Skin:  No rashes or lesions   Neurologic: AAOx3, No focal motor or sensory deficit. Labs Reviewed:    BMP:   Lab Results   Component Value Date/Time     12/14/2020 03:02 AM    K 3.0 (L) 12/14/2020 03:02 AM     12/14/2020 03:02 AM    CO2 33 (H) 12/14/2020 03:02 AM    AGAP 3 12/14/2020 03:02 AM     (H) 12/14/2020 03:02 AM    BUN 16 12/14/2020 03:02 AM    CREA 1.18 12/14/2020 03:02 AM    GFRAA 57 (L) 12/14/2020 03:02 AM    GFRNA 47 (L) 12/14/2020 03:02 AM     CMP:   Lab Results   Component Value Date/Time     12/14/2020 03:02 AM    K 3.0 (L) 12/14/2020 03:02 AM     12/14/2020 03:02 AM    CO2 33 (H) 12/14/2020 03:02 AM    AGAP 3 12/14/2020 03:02 AM     (H) 12/14/2020 03:02 AM    BUN 16 12/14/2020 03:02 AM    CREA 1.18 12/14/2020 03:02 AM    GFRAA 57 (L) 12/14/2020 03:02 AM    GFRNA 47 (L) 12/14/2020 03:02 AM    CA 9.3 12/14/2020 03:02 AM     CBC:   Lab Results   Component Value Date/Time    WBC 13.0 12/14/2020 03:02 AM    HGB 13.5 12/14/2020 03:02 AM    HCT 41.6 12/14/2020 03:02 AM     12/14/2020 03:02 AM     All Cardiac Markers in the last 24 hours:   Lab Results   Component Value Date/Time    CPK 47 12/14/2020 03:02 AM    CKMB 1.1 12/14/2020 03:02 AM    CKND1 2.3 12/14/2020 03:02 AM    TROIQ <0.02 12/14/2020 03:02 AM     CXR noted     Procedures/imaging: see electronic medical records for all procedures/Xrays and details which were not copied into this note but were reviewed prior to creation of Plan      Assessment/Plan     1. Acute super exacerbation: We will start IV steroids, bronchodilators and O2 supplement. 2. Acute bronchitis: We will start Augmentin and monitor. We will also get rapid Covid testing. Low suspicion. 3. Chronic respiratory failure on home O2  4. Hypertension: BP elevated, will resume Norvasc, clonidine, lisinopril and monitor blood pressure closely. 5. GERD: Continue Pepcid  6.  History of tobacco abuse  7. DVT/GI Prophylaxis: Lovenox   8. Full code    Discussed with patient at bedside about hospital admission and my plan care, she understood and agree with my plan care. Radha Walton MD  12/14/2020 7:04 PM    Disclaimer: Sections of this note are dictated using utilizing voice recognition software. Minor typographical errors may be present. If questions arise, please do not hesitate to contact me or call our department.
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
## 2020-12-15 NOTE — PROGRESS NOTES
RESPIRATORY CARE ASSESSMENT FOR BRONCHIAL HYGIENE OR LUNG EXPANSION THERAPY  Patient  Fermín Ribera     63 y.o.   female     12/15/2020  1:49 AM  Patient Active Problem List   Diagnosis Code    Abnormal CT of the chest R93.89    Asthma J45.909    Respiratory failure ([facility] J96.90    Acute exacerbation of COPD with asthma ([facility] J44.1, J39.36    Essential hypertension I10    Morbid obesity due to excess calories ([facility] E66.01    Hypertensive heart failure (HCC) I11.0    Sleep apnea G47.30    COPD (chronic obstructive pulmonary disease) with chronic bronchitis ([facility]) J44.9    T wave inversion in EKG R94.31    Tobacco use Z72.0    S/P hernia repair Z98.890, Z87.19    CKD (chronic kidney disease) stage 3, GFR 30-59 ml/min N18.30    Cocaine abuse ([facility] F14.10    Drug-seeking behavior Z76.5    Fibromyalgia M79.7    Depression F32.9    Fe deficiency anemia D50.9    Gastroesophageal reflux disease without esophagitis K21.9    HLD (hyperlipidemia) E78.5    Thyroid goiter E04.9    MDRO (multiple drug resistant organisms) resistance [id number]    On home O2 Z99.81    Polysubstance abuse ([facility]) U83.60    Uncomplicated severe persistent asthma J45.50    Vocal cord disease J38.3    COPD with acute exacerbation ([facility]) J44.1    Chronic respiratory failure with hypoxia ([facility]) J96.11    Acute bronchitis J20.9    Suspected COVID-19 virus infection Z20.828    Tachycardia R00.0    Normocytic anemia D64.9    Acute respiratory distress R06.03    COPD exacerbation ([facility]) J44.1    Acute exacerbation of chronic obstructive pulmonary disease (COPD) ([facility]) J44.1    UTI (urinary tract infection) N39.0    Atypical chest pain R07.89    DEMARCUS (acute kidney injury) ([facility]) N17.9    Asthma exacerbation J45. 0    COPD (chronic obstructive pulmonary disease) ([facility]) J44.9    Acute bronchitis with chronic obstructive pulmonary disease (COPD) ([facility]) J44.0, J20.9       ABG:  Date:12/15/2020  No results found for: PH, PHI, PCO2, PCO2I, PO2, PO2I, HCO3, HCO3I, FIO2, FIO2I    Chest X-ray:  Date:12/15/2020  Results from Hospital Encounter encounter on 12/14/20   XR CHEST PORT    Narrative EXAM:  Chest Portable. INDICATION:  Shortness of breath     COMPARISON:  12/05/20     TECHNIQUE:  Portable AP chest study    FINDINGS:      - Both lungs are clear.   - No pleural effusion or pneumothorax is detected. - Cardiac silhouette appears borderline prominent, probably related to AP  portable technique. - Mediastinum and hilar regions appear unremarkable. - No significant interval changes are noted. Impression IMPRESSION:    No acute abnormalities.        Lab Test:  Date:12/15/2020  WBC:   Lab Results   Component Value Date/Time    WBC 13.0 12/14/2020 03:02 AM   HGB:   Lab Results   Component Value Date/Time    HGB 13.5 12/14/2020 03:02 AM    PLTS:   Lab Results   Component Value Date/Time    PLATELET 635 40/44/6734 03:02 AM       SaO2%/flow: @LASTSAO2(1)@    Vital Signs:     Patient Vitals for the past 8 hrs:   Temp Pulse Resp BP SpO2   12/14/20 2358 97.8 °F (36.6 °C) 68 16 139/75 100 %   12/14/20 1830 97.8 °F (36.6 °C) 84 24 (!) 174/94 97 %         RA/O2 flow/device:        First Inital Assesment:     []Yes []No   Reevaluation/Reassessment:    []Yes []No     CHART REVIEW   Points 0 X 1 X 2 X 3 X 4 X Points   Pulmonary History Smoking History (1) none  Recent Smoking History <1 PPD  Recent Smoking History >1 PPD  Pulmonary Disease or Impairment  Severe Pulmonary Disease     Surgical History No Surgery  General Surgery  Lower Abdominal  Thoracic or Upper Abdominal  Thoracic & Pulmonary Disease     CXR Clear or not indicated  Chronic changes or CXR Pending  Infiltrate, atelectasis or pleural effusions  Infiltrates in more than 1lobe  Infiltrates +atelectasis  +/or pleural effusions       PATIENT ASSESSMENT    0 X 1 X 2 X 3 X 4 X Points   Respiratory Pattern Regular pattern RR 12-20  Increased RR 21-27   Mild Dyspnea at rest, irregular pattern RR 28-32  Moderate Dyspnea at rest, Use of accessory muscles, RR 33-36  Severe Dyspnea, Use of accessory muscles RR >36     Mental Status Alert Oriented cooperative  Confused, Follows commands  Lethargic, Does not follow commands  Obtunded  Unresponsive     Breath Sounds Clear  Decreased Unilaterally  Decreased Bilaterally  Mild Scattered wheezing or Crackles in bases  Severe Wheezing or rhonchi     Cough Strong dry NPC  Strong Productive  Weak NPC  Weak productive or weak with rhonchi  No cough or may require suctioning     Level of Activity Ambulatory  Ambulatory with assistance  Temporarily Non-ambulatory  Non-Ambulatory, able to position self  Unable to position self, confined to bed     Total Points/Score:        Specific Intervention Chart()    Bronchial Hygiene/Secretion Clearance:    []EZPAP []Rotation bed with vibration    []CPT with percussor []CPT via vest   []Oscillastiang positive pressure expiratory device      Lung Expansion:    []Incentive Spirometer w/RT visits []Incentive Spirometer w/nursing    []EZPAP [] Metaneb     *Suctioning:    []Nasal Tracheal []Tracheal     *suctioning will be ordered and done PRN with an associated frequency such as QID/PRN based on score       Other:    Care Plan   Level # Score Modality Frequency Comment   Level 1 >17      Level 2 14-17      Level 3 10-13      Level 4 1-9        BRONCHIAL HYGIENE SCORING AND FREQUENCY GUIDELINES   Frequency Indications/Findings Level #   Q4 ATC Copious secretions, SOB, unable to sleep 1   QID & PRN at night Moderate amounts of secretions 2   TID or Q6 wa Small amounts of secretions and poor cough: recent history of secretions 3   BID or Q8 wa Unable to deep breathe and cough effectively 4        Comments:  cxr is clear, no indication for bronchial hygiene at this time     Respiratory Therapist: Manasa Urena, RT

## 2020-12-15 NOTE — PROGRESS NOTES
Pt notified this nurse that she was actively receiving methadone an she has been without it this admission. Was able to confirm and verify that the patient was attending [facility]) [address] [place] [address], [facility] [address] . Called facility and spoke with dosing nurse Jewel Councilman, LPN who stated tht the patient was last dosed on 12/11 at 45 mg. Per nurse stated tht on [address] the patient gets her take home dose for Sunday but being as patient missed Saturday dosing she didn't get Sundays dose either. Advised attending provider Dr Tanisha Levy as per provider hasnt been able to reach appropriate center. Per provider will call to verify dose info.

## 2020-12-15 NOTE — PROGRESS NOTES
Pt requesting a regular diet, asked provider Dr Brar during rounds and per provider will not change diet.   Pt made aware and became very upset and started cussing

## 2020-12-15 NOTE — PROGRESS NOTES
Hospitalist Progress Note    Patient: Candi Nettles MRN: [id number]  CSN: [id number]    YOB: 1965  Age: 63 y.o. Sex: female    DOA: 12/14/2020 LOS:  LOS: 1 day              Continues to wheeze. Denies cough. Noted ambulating in hallway in the afternoon, speaking in full sentences off oxygen. Assessment/Plan       1. COPD w AE wean steroids, bronchodilators. 2.  Rapid Covid - 12/14/2020  3. Chronic hypoxic respiratory failure on home oxygen  4. Hypertension controlled  5. GERD on Pepcid  6. Tobacco abuse, history  7. Overweight Body mass index is 29.42 kg/m². 8.  DVT prophylaxis  9. Full code    Additional Notes:      Case discussed with:  []Patient  []Family  []Nursing  []Case Management  DVT Prophylaxis:  []Lovenox  []Hep SQ  []SCDs  []Coumadin   []On Heparin gtt    Vital signs/Intake and Output:  Visit Vitals  BP (!) 141/69 (BP 1 Location: Right arm, BP Patient Position: Sitting)   Pulse 80   Temp 98.3 °F (36.8 °C)   Resp 18   Ht 5' 3\" (1.6 m)   Wt 75.3 kg (166 lb 1.6 oz)   SpO2 97%   Breastfeeding No   BMI 29.42 kg/m²     Current Shift:  No intake/output data recorded. Last three shifts:  12/13 1901 - 12/15 0700  In: 1080 [P.O.:1080]  Out: -      General:  Alert, cooperative, no distress, appears stated age. Head: Normocephalic, without obvious abnormality, atraumatic. Eyes:  Conjunctivae/corneas clear. PERRL, EOMs intact. Nose: Nares normal. No drainage or sinus tenderness. Neck: Supple, symmetrical, trachea midline, no adenopathy, thyroid: no enlargement, no carotid bruit and no JVD. Lungs:    bilateral expiratory wheezing, no Rales or rhonchi. No accessory muscle use. Heart:  Regular rate and rhythm, S1, S2 normal.     Abdomen: Soft, non-tender. Bowel sounds normal.    Extremities: Extremities normal, atraumatic, no cyanosis or edema. Pulses: 2+ and symmetric all extremities.    Skin:  No rashes or lesions   Neurologic: AAOx3, No focal motor or sensory deficit. Medications Reviewed      Labs: Results:       Chemistry Recent Labs     12/14/20  0302   *      K 3.0*      CO2 33*   BUN 16   CREA 1.18   CA 9.3   AGAP 3   BUCR 14      CBC w/Diff Recent Labs     12/14/20  0302   WBC 13.0   RBC 4.52   HGB 13.5   HCT 41.6      GRANS 80*   LYMPH 14*   EOS 1      Cardiac Enzymes Recent Labs     12/14/20  0302   CPK 47   CKND1 2.3      Coagulation No results for input(s): PTP, INR, APTT, INREXT in the last 72 hours. Lipid Panel No results found for: CHOL, CHOLPOCT, CHOLX, CHLST, CHOLV, 240553, HDL, HDLP, LDL, LDLC, DLDLP, 666042, VLDLC, VLDL, TGLX, TRIGL, TRIGP, TGLPOCT, CHHD, CHHDX   BNP No results for input(s): BNPP in the last 72 hours. Liver Enzymes No results for input(s): TP, ALB, TBIL, AP in the last 72 hours. No lab exists for component: SGOT, GPT, DBIL   Thyroid Studies Lab Results   Component Value Date/Time    TSH 0.18 (L) 09/27/2019 10:50 PM        Procedures/imaging: see electronic medical records for all procedures/Xrays and details which were not copied into this note but were reviewed prior to creation of Plan.

## 2020-12-15 NOTE — PROGRESS NOTES
Per pt request asked Dr Mac Dailey for prn neb treatments for patient as inhaler is not working and patient is having exp wheezing. Spk to Dr Mac Dailey verbal face to face. Per provider will need to look into it.

## 2020-12-16 VITALS
WEIGHT: 169.47 LBS | OXYGEN SATURATION: 98 % | SYSTOLIC BLOOD PRESSURE: 131 MMHG | HEIGHT: 63 IN | HEART RATE: 59 BPM | DIASTOLIC BLOOD PRESSURE: 77 MMHG | BODY MASS INDEX: 30.03 KG/M2 | RESPIRATION RATE: 16 BRPM | TEMPERATURE: 97.8 F

## 2020-12-16 LAB
AMPHET UR QL SCN: NEGATIVE
BARBITURATES UR QL SCN: NEGATIVE
BENZODIAZ UR QL: NEGATIVE
CANNABINOIDS UR QL SCN: NEGATIVE
COCAINE UR QL SCN: POSITIVE
HDSCOM,HDSCOM: ABNORMAL
METHADONE UR QL: POSITIVE
OPIATES UR QL: NEGATIVE
PCP UR QL: NEGATIVE

## 2020-12-16 PROCEDURE — 74011250637 HC RX REV CODE- 250/637: Performed by: INTERNAL MEDICINE

## 2020-12-16 PROCEDURE — 74011636637 HC RX REV CODE- 636/637: Performed by: HOSPITALIST

## 2020-12-16 PROCEDURE — 80307 DRUG TEST PRSMV CHEM ANLYZR: CPT

## 2020-12-16 PROCEDURE — 2709999900 HC NON-CHARGEABLE SUPPLY

## 2020-12-16 PROCEDURE — 97116 GAIT TRAINING THERAPY: CPT

## 2020-12-16 PROCEDURE — 94640 AIRWAY INHALATION TREATMENT: CPT

## 2020-12-16 PROCEDURE — 74011000250 HC RX REV CODE- 250: Performed by: HOSPITALIST

## 2020-12-16 PROCEDURE — 99218 HC RM OBSERVATION: CPT

## 2020-12-16 PROCEDURE — 97530 THERAPEUTIC ACTIVITIES: CPT

## 2020-12-16 PROCEDURE — 74011250637 HC RX REV CODE- 250/637: Performed by: HOSPITALIST

## 2020-12-16 PROCEDURE — 99239 HOSP IP/OBS DSCHRG MGMT >30: CPT | Performed by: HOSPITALIST

## 2020-12-16 RX ORDER — ALPRAZOLAM 0.25 MG/1
0.25 TABLET ORAL ONCE
Status: COMPLETED | OUTPATIENT
Start: 2020-12-16 | End: 2020-12-16

## 2020-12-16 RX ORDER — PREDNISONE 20 MG/1
40 TABLET ORAL DAILY
Qty: 8 TAB | Refills: 0 | Status: SHIPPED | OUTPATIENT
Start: 2020-12-16 | End: 2020-12-20

## 2020-12-16 RX ORDER — ALPRAZOLAM 0.25 MG/1
TABLET ORAL
Status: DISPENSED
Start: 2020-12-16 | End: 2020-12-16

## 2020-12-16 RX ADMIN — ALPRAZOLAM 0.25 MG: 0.25 TABLET ORAL at 02:00

## 2020-12-16 RX ADMIN — ROFLUMILAST 500 MCG: 500 TABLET ORAL at 09:00

## 2020-12-16 RX ADMIN — LISINOPRIL 20 MG: 20 TABLET ORAL at 09:03

## 2020-12-16 RX ADMIN — IPRATROPIUM BROMIDE AND ALBUTEROL 1 PUFF: 20; 100 SPRAY, METERED RESPIRATORY (INHALATION) at 00:43

## 2020-12-16 RX ADMIN — CLONIDINE HYDROCHLORIDE 0.2 MG: 0.1 TABLET ORAL at 09:03

## 2020-12-16 RX ADMIN — PREDNISONE 40 MG: 20 TABLET ORAL at 09:03

## 2020-12-16 RX ADMIN — Medication 81 MG: at 09:03

## 2020-12-16 RX ADMIN — METHADONE HYDROCHLORIDE 45 MG: 10 CONCENTRATE ORAL at 09:31

## 2020-12-16 RX ADMIN — IPRATROPIUM BROMIDE AND ALBUTEROL SULFATE 3 ML: .5; 3 SOLUTION RESPIRATORY (INHALATION) at 12:39

## 2020-12-16 RX ADMIN — IPRATROPIUM BROMIDE AND ALBUTEROL 1 PUFF: 20; 100 SPRAY, METERED RESPIRATORY (INHALATION) at 07:33

## 2020-12-16 RX ADMIN — FAMOTIDINE 20 MG: 20 TABLET, FILM COATED ORAL at 09:03

## 2020-12-16 RX ADMIN — Medication 10 ML: at 06:50

## 2020-12-16 RX ADMIN — AMLODIPINE BESYLATE 10 MG: 10 TABLET ORAL at 09:03

## 2020-12-16 RX ADMIN — CITALOPRAM HYDROBROMIDE 20 MG: 20 TABLET ORAL at 06:48

## 2020-12-16 RX ADMIN — IPRATROPIUM BROMIDE AND ALBUTEROL 1 PUFF: 20; 100 SPRAY, METERED RESPIRATORY (INHALATION) at 04:00

## 2020-12-16 NOTE — PROGRESS NOTES
Called [employer] [phone] and arranged for medicaid cab with Bryant Flowers from [place] to [facility]. Trip #[id number].  3 hour window and will call nurses station upon arrival.     D/C orders received. No needs identified by . Chart reviewed. Pt will be transported home by [facility] .  available as needed.     Harshal Gordon RN BSN  Care Manager  [phone]

## 2020-12-16 NOTE — DISCHARGE SUMMARY
Discharge Summary    Patient: Jennie Rivera MRN: [id number]  CSN: [id number]    YOB: 1965  Age: 63 y.o.   Sex: female    DOA: 12/14/2020 LOS:  LOS: 2 days   Discharge Date:      Admission Diagnoses: COPD exacerbation ([place] [address] [J44.1]  COPD exacerbation ([place] [address] [J44.1]    Discharge Diagnoses:    Problem List as of 12/16/2020 Date Reviewed: 10/23/2019          Codes Class Noted - Resolved    Acute bronchitis with chronic obstructive pulmonary disease (COPD) ([place] [address] ICD-10-CM: J44.0, J20.9  ICD-9-CM: 491.22  11/28/2020 - Present        Asthma exacerbation ICD-10-CM: J45.901  ICD-9-CM: 493.92  11/12/2020 - Present        COPD (chronic obstructive pulmonary disease) ([place] [address] ICD-10-CM: J44.9  ICD-9-CM: 496  11/12/2020 - Present        UTI (urinary tract infection) ICD-10-CM: N39.0  ICD-9-CM: 599.0  10/8/2020 - Present        Atypical chest pain ICD-10-CM: R07.89  ICD-9-CM: 786.59  10/8/2020 - Present        DEMARCUS (acute kidney injury) ([place] [address] ICD-10-CM: N17.9  ICD-9-CM: 584.9  10/8/2020 - Present        Acute exacerbation of chronic obstructive pulmonary disease (COPD) ([place] [address] ICD-10-CM: J44.1  ICD-9-CM: 491.21  9/14/2020 - Present        COPD with acute exacerbation ([place] [address] ICD-10-CM: J44.1  ICD-9-CM: 491.21  9/7/2020 - Present        Chronic respiratory failure with hypoxia ([place] [address] ICD-10-CM: J96.11  ICD-9-CM: 518.83, 799.02  9/7/2020 - Present        Acute bronchitis ICD-10-CM: J20.9  ICD-9-CM: 466.0  9/7/2020 - Present        Suspected COVID-19 virus infection ICD-10-CM: Z20.828  ICD-9-CM: V01.79  9/7/2020 - Present        Tachycardia ICD-10-CM: R00.0  ICD-9-CM: 785.0  9/7/2020 - Present        Normocytic anemia ICD-10-CM: D64.9  ICD-9-CM: 285.9  9/7/2020 - Present        Acute respiratory distress ICD-10-CM: R06.03  ICD-9-CM: 518.82  9/7/2020 - Present        COPD exacerbation ([address] ICD-10-CM: J44.1  ICD-9-CM: 491.21  9/7/2020 - Present        Depression ICD-10-CM: F32.9  ICD-9-CM: 311  3/22/2020 - Present S/P hernia repair ICD-10-CM: P60.175, Z87.19  ICD-9-CM: V45.89  10/31/2019 - Present        CKD (chronic kidney disease) stage 3, GFR 30-59 ml/min ICD-10-CM: N18.30  ICD-9-CM: 585.3  10/31/2019 - Present        Tobacco use ICD-10-CM: Z72.0  ICD-9-CM: 305.1  9/28/2019 - Present        T wave inversion in EKG ICD-10-CM: R94.31  ICD-9-CM: 794.31  3/9/2019 - Present        HLD (hyperlipidemia) ICD-10-CM: E78.5  ICD-9-CM: 272.4  9/15/2018 - Present        Polysubstance abuse ([facility] ICD-10-CM: F19.10  ICD-9-CM: 305.90  9/15/2018 - Present        Hypertensive heart failure ([facility] ICD-10-CM: I11.0  ICD-9-CM: 402.91, 428.9  12/19/2017 - Present        Sleep apnea ICD-10-CM: G47.30  ICD-9-CM: 780.57  12/19/2017 - Present        COPD (chronic obstructive pulmonary disease) with chronic bronchitis ([facility] ICD-10-CM: J44.9  ICD-9-CM: 491.20  12/19/2017 - Present        Cocaine abuse ([facility] ICD-10-CM: F14.10  ICD-9-CM: 305.60  11/26/2017 - Present        Essential hypertension ICD-10-CM: I10  ICD-9-CM: 401.9  3/10/2017 - Present        Morbid obesity due to excess calories ([facility] ICD-10-CM: E66.01  ICD-9-CM: 278.01  3/10/2017 - Present        Acute exacerbation of COPD with asthma ([facility] ICD-10-CM: J44.1, J45.901  ICD-9-CM: 493.22  3/2/2017 - Present        Respiratory failure ([facility] ICD-10-CM: J96.90  ICD-9-CM: 518.81  1/11/2017 - Present        Fibromyalgia ICD-10-CM: M79.7  ICD-9-CM: 729.1  12/16/2016 - Present        Uncomplicated severe persistent asthma ICD-10-CM: J45.50  ICD-9-CM: 493.90  12/13/2016 - Present        Vocal cord disease ICD-10-CM: J38.3  ICD-9-CM: 478.5  12/7/2016 - Present        Drug-seeking behavior ICD-10-CM: Z76.5  ICD-9-CM: 305.90  11/19/2016 - Present        Gastroesophageal reflux disease without esophagitis ICD-10-CM: K21.9  ICD-9-CM: 530.81  10/10/2016 - Present        Thyroid goiter ICD-10-CM: E04.9  ICD-9-CM: 240.9  6/30/2016 - Present        Asthma ICD-10-CM: J45.909  ICD-9-CM: 493.90  3/5/2016 - Present        On home O2 ICD-10-CM: Z99.81  ICD-9-CM: V46.2  12/3/2015 - Present    Overview Signed 3/22/2020 10:33 PM by DARCIE Elias     Overview:   2L at home per pt for approx 8 years             Abnormal CT of the chest ICD-10-CM: R93.89  ICD-9-CM: 793.2  11/20/2015 - Present        MDRO (multiple drug resistant organisms) resistance ICD-10-CM: FVD9483  ICD-9-CM: V09.91  11/8/2012 - Present        Fe deficiency anemia ICD-10-CM: D50.9  ICD-9-CM: 280.9  10/27/2012 - Present        RESOLVED: CAP (community acquired pneumonia) ICD-10-CM: J18.9  ICD-9-CM: 486  9/28/2019 - 10/13/2019        RESOLVED: Chest pain on breathing ICD-10-CM: R07.1  ICD-9-CM: 786.52  3/10/2017 - 10/13/2019        RESOLVED: COPD with exacerbation ([facility] ICD-10-CM: J44.1  ICD-9-CM: 491.21  7/11/2016 - 9/28/2019        RESOLVED: COPD exacerbation ([address] ICD-10-CM: J44.1  ICD-9-CM: 491.21  3/12/2016 - 9/28/2019        RESOLVED: Shortness of breath ICD-10-CM: R06.02  ICD-9-CM: 786.05  3/5/2016 - 9/28/2019        RESOLVED: COPD exacerbation ([address] ICD-10-CM: J44.1  ICD-9-CM: 491.21  2/2/2016 - 2/8/2016        RESOLVED: COPD with exacerbation ([address] ICD-10-CM: J44.1  ICD-9-CM: 491.21  2/2/2016 - 2/8/2016        RESOLVED: COPD with acute exacerbation ([address] ICD-10-CM: J44.1  ICD-9-CM: 491.21  11/17/2015 - 11/20/2015        RESOLVED: Asthma exacerbation ICD-10-CM: J45.901  ICD-9-CM: 493.92  11/17/2015 - 11/20/2015              Reason for Admission  63 y.o.  female with past medical history of chronic respiratory failure, COPD, hypertension presented to the ED with shortness of breath. Patient reported  exacerbation of COPD, not improved with home inhalers and nebulizers. In emergency room patient was noted bilateral wheezing, patient was started on IV steroids and bronchodilators and admitted to hospital.  Patient denied any sick contact, no exposure to COVID-19. Denied change to her taste or smell.  Patient admitted to having cough with minimal sputum production. Patient has chronic dyspnea at her baseline, and wheezes on a daily basis. Dyspnea had been worse for the past couple of days. She denied current use of tobacco.        Discharge Condition: Good    PHYSICAL EXAM at discharge  Visit Vitals  /74 (BP 1 Location: Right arm, BP Patient Position: Sitting)   Pulse (!) 52   Temp 97.8 °F (36.6 °C)   Resp 16   Ht 5' 3\" (1.6 m)   Wt 76.9 kg (169 lb 7.5 oz)   SpO2 99%   Breastfeeding No   BMI 30.02 kg/m²     General:  Alert, cooperative, no distress. Noted ambulating in hallway on room air, speaking in full sentences on room air. Ambulating independently and without difficulty.            WOLG: Normocephalic, without obvious abnormality, atraumatic. Eyes:  Conjunctivae/corneas clear. PERRL, EOMs intact. Nose: Nares normal. No drainage or sinus tenderness. Neck: Supple, symmetrical, trachea midline, no adenopathy, thyroid: no enlargement, no carotid bruit and no JVD. Lungs:    CTA bilaterally. No Rales or rhonchi.  No accessory muscle use. Heart:  Regular rate and rhythm, S1, S2 normal.     Abdomen: Soft, non-tender. Bowel sounds normal.    Extremities: Extremities normal, atraumatic, no cyanosis or edema. Pulses: 2+ and symmetric all extremities. Skin:  No rashes or lesions   Neurologic: AAOx3, No focal motor or sensory deficit. Hospital Course:   1. COPD w AE  resolving status post IV steroids, bronchodilators. Cocaine cessation recommended. Discharge with short course of steroids. No need for antibiotics. 2.  Rapid Covid negative 12/14/2020  3. Chronic hypoxic respiratory failure on home oxygen. Resumed at discharge. 4.  Hypertension controlled  5. GERD on Pepcid  6. Tobacco abuse, history  7. Overweight Body mass index is 29.42 kg/m². 8.    Urine drug screen positive for cocaine.   Concern for potential for overdose as patient  confirmed to attend [facility] methadone clinic [phone], dose 45 mg, last  12/11/2020.   9. DVT prophylaxis was given in the form of lovenox subcut daily  10. Full code. Patient is not homebound and no home health care needs were identified. Discharge to home. Patient agrees, all questions answered to the best of my ability. Consults: None    Significant Diagnostic Studies: labs:   Recent Results (from the past 24 hour(s))   DRUG SCREEN, URINE    Collection Time: 12/16/20  6:00 AM   Result Value Ref Range    BENZODIAZEPINES Negative NEG      BARBITURATES Negative NEG      THC (TH-CANNABINOL) Negative NEG      OPIATES Negative NEG      PCP(PHENCYCLIDINE) Negative NEG      COCAINE Positive (A) NEG      AMPHETAMINES Negative NEG      METHADONE Positive (A) NEG      HDSCOM (NOTE)       Ref. Range 12/15/2020 08:59   WBC Latest Ref Range: 4.6 - 13.2 K/uL 11.7   RBC Latest Ref Range: 4.20 - 5.30 M/uL 4.17 (L)   HGB Latest Ref Range: 12.0 - 16.0 g/dL 12.2   HCT Latest Ref Range: 35.0 - 45.0 % 37.9   MCV Latest Ref Range: 74.0 - 97.0 FL 90.9   MCH Latest Ref Range: 24.0 - 34.0 PG 29.3   MCHC Latest Ref Range: 31.0 - 37.0 g/dL 32.2   RDW Latest Ref Range: 11.6 - 14.5 % 12.8   PLATELET Latest Ref Range: 135 - 420 K/uL 231   MPV Latest Ref Range: 9.2 - 11.8 FL 11.6   NEUTROPHILS Latest Ref Range: 40 - 73 % 92 (H)   LYMPHOCYTES Latest Ref Range: 21 - 52 % 6 (L)   MONOCYTES Latest Ref Range: 3 - 10 % 2 (L)   EOSINOPHILS Latest Ref Range: 0 - 5 % 0   BASOPHILS Latest Ref Range: 0 - 2 % 0   DF Latest Units:   AUTOMATED   ABS. NEUTROPHILS Latest Ref Range: 1.8 - 8.0 K/UL 10.8 (H)   ABS. LYMPHOCYTES Latest Ref Range: 0.9 - 3.6 K/UL 0.7 (L)   ABS. MONOCYTES Latest Ref Range: 0.05 - 1.2 K/UL 0.2      Ref.  Range 12/15/2020 08:59   Sodium Latest Ref Range: 136 - 145 mmol/L 142   Potassium Latest Ref Range: 3.5 - 5.5 mmol/L 4.2   Chloride Latest Ref Range: 100 - 111 mmol/L 108   CO2 Latest Ref Range: 21 - 32 mmol/L 27   Anion gap Latest Ref Range: 3.0 - 18 mmol/L 7   Glucose Latest Ref Range: 74 - 99 mg/dL 118 (H)   BUN Latest Ref Range: 7.0 - 18 MG/DL 20 (H)   Creatinine Latest Ref Range: 0.6 - 1.3 MG/DL 1.07   BUN/Creatinine ratio Latest Ref Range: 12 - 20   19   Calcium Latest Ref Range: 8.5 - 10.1 MG/DL 8.6   Magnesium Latest Ref Range: 1.6 - 2.6 mg/dL 1.7   GFR est non-AA Latest Ref Range: >60 ml/min/1.73m2 53 (L)   GFR est AA Latest Ref Range: >60 ml/min/1.73m2 >60       IMAGING  XR Results (most recent):  Results from Hospital Encounter encounter on 12/14/20   XR CHEST PORT    Narrative EXAM:  Chest Portable. INDICATION:  Shortness of breath     COMPARISON:  12/05/20     TECHNIQUE:  Portable AP chest study    FINDINGS:      - Both lungs are clear.   - No pleural effusion or pneumothorax is detected. - Cardiac silhouette appears borderline prominent, probably related to AP  portable technique. - Mediastinum and hilar regions appear unremarkable. - No significant interval changes are noted. Impression IMPRESSION:    No acute abnormalities. Discharge Medications:     Current Discharge Medication List      CONTINUE these medications which have CHANGED    Details   predniSONE (DELTASONE) 20 mg tablet Take 40 mg by mouth daily for 4 days. Qty: 8 Tab, Refills: 0         CONTINUE these medications which have NOT CHANGED    Details   albuterol (ACCUNEB) 1.25 mg/3 mL nebu Take 3 mL by inhalation every four (4) hours as needed for Wheezing (wheezing). Qty: 25 Each, Refills: 0      albuterol (PROVENTIL VENTOLIN) 2.5 mg /3 mL (0.083 %) nebu 3 mL by Nebulization route every six (6) hours as needed for Wheezing, Shortness of Breath or Respiratory Distress. Qty: 30 Nebule, Refills: 0      citalopram (CeleXA) 20 mg tablet Take 1 Tab by mouth every morning. Qty: 30 Tab, Refills: 0      fluticasone furoate-vilanteroL (Breo Ellipta) 200-25 mcg/dose inhaler Take 1 Puff by inhalation daily.   Qty: 1 Inhaler, Refills: 0      roflumilast (DALIRESP) 500 mcg tab tablet Take 1 Tab by mouth daily.  Qty: 30 Tab, Refills: 0      montelukast (SINGULAIR) 10 mg tablet Take 1 Tab by mouth nightly. Indications: controller medication for asthma  Qty: 30 Tab, Refills: 1      cloNIDine HCL (CATAPRES) 0.2 mg tablet Take 0.2 mg by mouth three (3) times daily. lisinopriL (PRINIVIL, ZESTRIL) 20 mg tablet Take 20 mg by mouth daily. amLODIPine (NORVASC) 10 mg tablet Take 10 mg by mouth daily. albuterol (PROVENTIL HFA, VENTOLIN HFA, PROAIR HFA) 90 mcg/actuation inhaler Take 2 Puffs by inhalation every four (4) hours as needed for Wheezing. Qty: 1 Inhaler, Refills: 0      nitroglycerin (NITROSTAT) 0.4 mg SL tablet Take 1 Tab by mouth every five (5) minutes as needed for Chest Pain. Sit/Lay down then put one tab under the tongue every 5 minutes as needed for chest pain for 3 doses  Qty: 1 Bottle, Refills: 0      aspirin delayed-release 81 mg tablet Take 1 Tab by mouth daily. Qty: 30 Tab, Refills: 0      famotidine (PEPCID) 20 mg tablet Take 1 Tab by mouth daily. Qty: 30 Tab, Refills: 1      OXYGEN-AIR DELIVERY SYSTEMS 3 L by IntraNASal route as needed for Other (sob). atorvastatin (LIPITOR) 20 mg tablet Take 1 Tab by mouth nightly. Qty: 30 Tab, Refills: 0      naloxone (NARCAN) 4 mg/actuation nasal spray Use 1 spray intranasally, then discard. Repeat with new spray every 2 min as needed for opioid overdose symptoms, alternating nostrils. Qty: 1 Each, Refills: 0             Activity: Activity as tolerated    Diet: Cardiac Diet    Wound Care: None needed    Follow-up:   NP Shantanu 3 - 5 days.      Minutes spent on discharge: > 30
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
## 2020-12-16 NOTE — PROGRESS NOTES
conducted an initial consultation and Spiritual Assessment for Elliot Oh, who is a 63 y.o.,female. Patient's Primary Language is: [place]. According to the patient's EMR Taoism Affiliation is: [place].      The reason the Patient came to the hospital is:   Patient Active Problem List    Diagnosis Date Noted    Acute bronchitis with chronic obstructive pulmonary disease (COPD) ([address] 11/28/2020    Asthma exacerbation 11/12/2020    COPD (chronic obstructive pulmonary disease) ([address] 11/12/2020    UTI (urinary tract infection) 10/08/2020    Atypical chest pain 10/08/2020    DEMARCUS (acute kidney injury) ([address] 10/08/2020    Acute exacerbation of chronic obstructive pulmonary disease (COPD) ([address] 09/14/2020    COPD with acute exacerbation ([address] 09/07/2020    Chronic respiratory failure with hypoxia ([address] 09/07/2020    Acute bronchitis 09/07/2020    Suspected COVID-19 virus infection 09/07/2020    Tachycardia 09/07/2020    Normocytic anemia 09/07/2020    Acute respiratory distress 09/07/2020    COPD exacerbation ([address] 09/07/2020    Depression 03/22/2020    S/P hernia repair 10/31/2019    CKD (chronic kidney disease) stage 3, GFR 30-59 ml/min 10/31/2019    Tobacco use 09/28/2019    T wave inversion in EKG 03/09/2019    HLD (hyperlipidemia) 09/15/2018    Polysubstance abuse ([address] 09/15/2018    Hypertensive heart failure ([address] 12/19/2017    Sleep apnea 12/19/2017    COPD (chronic obstructive pulmonary disease) with chronic bronchitis ([address] 12/19/2017    Cocaine abuse ([address] 11/26/2017    Essential hypertension 03/10/2017    Morbid obesity due to excess calories ([address] 03/10/2017    Acute exacerbation of COPD with asthma ([address] 03/02/2017    Respiratory failure ([address] 01/11/2017    Fibromyalgia 03/08/3324    Uncomplicated severe persistent asthma 12/13/2016    Vocal cord disease 12/07/2016    Drug-seeking behavior 11/19/2016    Gastroesophageal reflux disease without esophagitis 10/10/2016    Thyroid goiter 06/30/2016    Asthma 03/05/2016    On home O2 12/03/2015    Abnormal CT of the chest 11/20/2015    MDRO (multiple drug resistant organisms) resistance 11/08/2012    Fe deficiency anemia 10/27/2012        The  provided the following Interventions:  Initiated a relationship of care and support. Listened empathetically. Provided chaplaincy education. Offered assurance of continued prayers on patient's behalf. Chart reviewed. The following outcomes where achieved:  Patient shared limited information about both their medical narrative and spiritual beliefs. Patient processed feeling about current hospitalization. Patient expressed gratitude for 's visit. Assessment:  Patient does not have any known Jain/cultural needs that will affect patient's preferences in health care. There are no known spiritual or Jain issues which require intervention at this time. Plan:  Chaplains will continue to follow and will provide pastoral care as needed and as requested.  recommends bedside caregivers page the  on duty if patient shows signs of spiritual or emotional distress. [facility], [place].  [address]ata Mcgee [address]   [phone]

## 2020-12-16 NOTE — ROUTINE PROCESS
Discharged to cab via wheelchair.  went to clothes closet to obtain some pants but refused to wait for pants to put on, stated\" Have left in a gown before and will do so today\".

## 2020-12-16 NOTE — DISCHARGE INSTRUCTIONS
Patient Education        Chronic Obstructive Pulmonary Disease (COPD): Care Instructions  Your Care Instructions     Chronic obstructive pulmonary disease (COPD) is a general term for a group of lung diseases, including emphysema and chronic bronchitis. People with COPD have decreased airflow in and out of the lungs, which makes it hard to breathe. The airways also can get clogged with thick mucus. Cigarette smoking is a major cause of COPD. Although there is no cure for COPD, you can slow its progress. Following your treatment plan and taking care of yourself can help you feel better and live longer. Follow-up care is a key part of your treatment and safety. Be sure to make and go to all appointments, and call your doctor if you are having problems. It's also a good idea to know your test results and keep a list of the medicines you take. How can you care for yourself at home? Staying healthy    · Do not smoke. This is the most important step you can take to prevent more damage to your lungs. If you need help quitting, talk to your doctor about stop-smoking programs and medicines. These can increase your chances of quitting for good.     · Avoid colds and flu. Get a pneumococcal vaccine shot. If you have had one before, ask your doctor whether you need a second dose. Get the flu vaccine every fall. If you must be around people with colds or the flu, wash your hands often.     · Avoid secondhand smoke, air pollution, and high altitudes. Also avoid cold, dry air and hot, humid air. Stay at home with your windows closed when air pollution is bad. Medicines and oxygen therapy    · Take your medicines exactly as prescribed. Call your doctor if you think you are having a problem with your medicine. You may be taking medicines such as:  ? Bronchodilators. These help open your airways and make breathing easier. They are either short-acting (work for 6 to 9 hours) or long-acting (work for 24 hours).  You inhale most bronchodilators, so they start to act quickly. Always carry your quick-relief inhaler with you in case you need it while you are away from home. ? Corticosteroids (prednisone, budesonide). These reduce airway inflammation. They come in pill or inhaled form. You must take these medicines every day for them to work well.     · Ask your doctor or pharmacist if a spacer is right for you. A spacer may help you get more inhaled medicine to your lungs. If you use one, ask how to use it properly.     · Do not take any vitamins, over-the-counter medicine, or herbal products without talking to your doctor first.     · If your doctor prescribed antibiotics, take them as directed. Do not stop taking them just because you feel better. You need to take the full course of antibiotics.     · If you use oxygen therapy, use the flow rate your doctor has recommended. Don't change it without talking to your doctor first. Oxygen therapy boosts the amount of oxygen in your blood and helps you breathe easier. Activity    · Get regular exercise. Walking is an easy way to get exercise. Start out slowly, and walk a little more each day.     · Pay attention to your breathing. You are exercising too hard if you can't talk while you exercise.     · Take short rest breaks when doing household chores and other activities.     · Learn breathing methods--such as breathing through pursed lips--to help you become less short of breath.     · If your doctor has not set you up with a pulmonary rehabilitation program, ask if rehab is right for you. Rehab includes exercise programs, education about your disease and how to manage it, help with diet and other changes, and emotional support. Diet    · Eat regular, healthy meals. Use bronchodilators about 1 hour before you eat to make it easier to eat. Eat several small meals instead of three large ones.  Drink beverages at the end of the meal. Avoid foods that are hard to chew.     · Eat foods that contain protein so you don't lose muscle mass.     · Talk with your doctor if you gain too much weight or if you lose weight without trying. Mental health    · Talk to your family, friends, or a therapist about your feelings. Some people feel frightened, angry, hopeless, helpless, and even guilty. Talking openly about bad feelings can help you cope. If these feelings last, talk to your doctor. When should you call for help? Call 911 anytime you think you may need emergency care. For example, call if:    · You have severe trouble breathing. Call your doctor now or seek immediate medical care if:    · You have new or worse trouble breathing.     · You cough up blood.     · You have a fever. Watch closely for changes in your health, and be sure to contact your doctor if:    · You cough more deeply or more often, especially if you notice more mucus or a change in the color of your mucus.     · You have new or worse swelling in your legs or belly.     · You are not getting better as expected. Where can you learn more? Go to http://www.gray.com/  Enter Y986 in the search box to learn more about \"Chronic Obstructive Pulmonary Disease (COPD): Care Instructions. \"  Current as of: February 24, 2020               Content Version: 12.6  © 2006-2020 [employer]. Care instructions adapted under license by [employer] (which disclaims liability or warranty for this information). If you have questions about a medical condition or this instruction, always ask your healthcare professional. [place] [address] any warranty or liability for your use of this information.        Patient armband removed and shredded    DISCHARGE SUMMARY from Nurse    PATIENT INSTRUCTIONS:    What to do at Home:  Recommended activity: Activity as tolerated,     If you experience any of the following symptoms increased shortness of breath, please follow up with nearst emergency room or primary care physician. *  Please give a list of your current medications to your Primary Care Provider. *  Please update this list whenever your medications are discontinued, doses are      changed, or new medications (including over-the-counter products) are added. *  Please carry medication information at all times in case of emergency situations. These are general instructions for a healthy lifestyle:    No smoking/ No tobacco products/ Avoid exposure to second hand smoke  Surgeon General's Warning:  Quitting smoking now greatly reduces serious risk to your health. Obesity, smoking, and sedentary lifestyle greatly increases your risk for illness    A healthy diet, regular physical exercise & weight monitoring are important for maintaining a healthy lifestyle    You may be retaining fluid if you have a history of heart failure or if you experience any of the following symptoms:  Weight gain of 3 pounds or more overnight or 5 pounds in a week, increased swelling in our hands or feet or shortness of breath while lying flat in bed. Please call your doctor as soon as you notice any of these symptoms; do not wait until your next office visit. The discharge information has been reviewed with the patient. The patient verbalized understanding. Discharge medications reviewed with the patient and appropriate educational materials and side effects teaching were provided.   ___________________________________________________________________________________________________________________________________

## 2020-12-16 NOTE — ROUTINE PROCESS
Receiving breathing treatment per request. In no immediate distress. Dozing while receiving treatment.

## 2020-12-16 NOTE — ROUTINE PROCESS
As part of the discharge instructions, medications already given today were discussed with the patient. The next dose due of all ordered meds was highlighted as part of the medication discharge instructions. Discussed with the patient the importance of taking medications as directed, as well as the side effects and adverse reactions to medications ordered. Acknowledged understanding. Requesting for a medicaid cab to be called.

## 2020-12-17 ENCOUNTER — APPOINTMENT (OUTPATIENT)
Dept: GENERAL RADIOLOGY | Age: 55
End: 2020-12-17
Attending: EMERGENCY MEDICINE
Payer: MEDICAID

## 2020-12-17 ENCOUNTER — HOSPITAL ENCOUNTER (EMERGENCY)
Age: 55
Discharge: HOME | End: 2020-12-18
Attending: EMERGENCY MEDICINE
Payer: MEDICAID

## 2020-12-17 DIAGNOSIS — J44.9 CHRONIC OBSTRUCTIVE PULMONARY DISEASE, UNSPECIFIED COPD TYPE (HCC): Primary | ICD-10-CM

## 2020-12-17 DIAGNOSIS — F41.1 ANXIETY STATE: ICD-10-CM

## 2020-12-17 LAB
ALBUMIN SERPL-MCNC: 3.7 G/DL (ref 3.4–5)
ALBUMIN/GLOB SERPL: 1 {RATIO} (ref 0.8–1.7)
ALP SERPL-CCNC: 93 U/L (ref 45–117)
ALT SERPL-CCNC: 23 U/L (ref 13–56)
ANION GAP SERPL CALC-SCNC: 3 MMOL/L (ref 3–18)
AST SERPL-CCNC: 13 U/L (ref 10–38)
BASOPHILS # BLD: 0 K/UL (ref 0–0.1)
BASOPHILS NFR BLD: 0 % (ref 0–2)
BILIRUB SERPL-MCNC: 0.2 MG/DL (ref 0.2–1)
BUN SERPL-MCNC: 30 MG/DL (ref 7–18)
BUN/CREAT SERPL: 21 (ref 12–20)
CALCIUM SERPL-MCNC: 9 MG/DL (ref 8.5–10.1)
CHLORIDE SERPL-SCNC: 105 MMOL/L (ref 100–111)
CK MB CFR SERPL CALC: 3.6 % (ref 0–4)
CK MB SERPL-MCNC: 2.1 NG/ML (ref 5–25)
CK SERPL-CCNC: 58 U/L (ref 26–192)
CO2 SERPL-SCNC: 31 MMOL/L (ref 21–32)
CREAT SERPL-MCNC: 1.44 MG/DL (ref 0.6–1.3)
D DIMER PPP FEU-MCNC: 0.33 UG/ML(FEU)
DIFFERENTIAL METHOD BLD: ABNORMAL
EOSINOPHIL # BLD: 0 K/UL (ref 0–0.4)
EOSINOPHIL NFR BLD: 0 % (ref 0–5)
ERYTHROCYTE [DISTWIDTH] IN BLOOD BY AUTOMATED COUNT: 12.8 % (ref 11.6–14.5)
GLOBULIN SER CALC-MCNC: 3.8 G/DL (ref 2–4)
GLUCOSE SERPL-MCNC: 101 MG/DL (ref 74–99)
HCT VFR BLD AUTO: 43.1 % (ref 35–45)
HGB BLD-MCNC: 13.3 G/DL (ref 12–16)
LYMPHOCYTES # BLD: 1.4 K/UL (ref 0.9–3.6)
LYMPHOCYTES NFR BLD: 10 % (ref 21–52)
MCH RBC QN AUTO: 29.2 PG (ref 24–34)
MCHC RBC AUTO-ENTMCNC: 30.9 G/DL (ref 31–37)
MCV RBC AUTO: 94.5 FL (ref 74–97)
MONOCYTES # BLD: 1.2 K/UL (ref 0.05–1.2)
MONOCYTES NFR BLD: 9 % (ref 3–10)
NEUTS SEG # BLD: 11 K/UL (ref 1.8–8)
NEUTS SEG NFR BLD: 81 % (ref 40–73)
PLATELET # BLD AUTO: 203 K/UL (ref 135–420)
PMV BLD AUTO: 10.8 FL (ref 9.2–11.8)
POTASSIUM SERPL-SCNC: 3.4 MMOL/L (ref 3.5–5.5)
PROT SERPL-MCNC: 7.5 G/DL (ref 6.4–8.2)
RBC # BLD AUTO: 4.56 M/UL (ref 4.2–5.3)
SODIUM SERPL-SCNC: 139 MMOL/L (ref 136–145)
TROPONIN I SERPL-MCNC: <0.02 NG/ML (ref 0–0.04)
WBC # BLD AUTO: 13.6 K/UL (ref 4.6–13.2)

## 2020-12-17 PROCEDURE — 94640 AIRWAY INHALATION TREATMENT: CPT

## 2020-12-17 PROCEDURE — 99285 EMERGENCY DEPT VISIT HI MDM: CPT

## 2020-12-17 PROCEDURE — 74011250636 HC RX REV CODE- 250/636: Performed by: EMERGENCY MEDICINE

## 2020-12-17 PROCEDURE — 71046 X-RAY EXAM CHEST 2 VIEWS: CPT

## 2020-12-17 PROCEDURE — 82550 ASSAY OF CK (CPK): CPT

## 2020-12-17 PROCEDURE — 85379 FIBRIN DEGRADATION QUANT: CPT

## 2020-12-17 PROCEDURE — 74011250637 HC RX REV CODE- 250/637: Performed by: EMERGENCY MEDICINE

## 2020-12-17 PROCEDURE — 80053 COMPREHEN METABOLIC PANEL: CPT

## 2020-12-17 PROCEDURE — 85025 COMPLETE CBC W/AUTO DIFF WBC: CPT

## 2020-12-17 PROCEDURE — 74011000250 HC RX REV CODE- 250: Performed by: EMERGENCY MEDICINE

## 2020-12-17 PROCEDURE — 93005 ELECTROCARDIOGRAM TRACING: CPT

## 2020-12-17 RX ORDER — CLONIDINE HYDROCHLORIDE 0.1 MG/1
0.2 TABLET ORAL
Status: COMPLETED | OUTPATIENT
Start: 2020-12-17 | End: 2020-12-17

## 2020-12-17 RX ORDER — IPRATROPIUM BROMIDE AND ALBUTEROL SULFATE 2.5; .5 MG/3ML; MG/3ML
3 SOLUTION RESPIRATORY (INHALATION)
Status: COMPLETED | OUTPATIENT
Start: 2020-12-17 | End: 2020-12-17

## 2020-12-17 RX ORDER — LISINOPRIL 20 MG/1
20 TABLET ORAL
Status: COMPLETED | OUTPATIENT
Start: 2020-12-17 | End: 2020-12-17

## 2020-12-17 RX ORDER — SODIUM CHLORIDE 9 MG/ML
250 INJECTION, SOLUTION INTRAVENOUS ONCE
Status: COMPLETED | OUTPATIENT
Start: 2020-12-17 | End: 2020-12-18

## 2020-12-17 RX ORDER — POTASSIUM CHLORIDE 20 MEQ/1
40 TABLET, EXTENDED RELEASE ORAL
Status: COMPLETED | OUTPATIENT
Start: 2020-12-17 | End: 2020-12-17

## 2020-12-17 RX ADMIN — IPRATROPIUM BROMIDE AND ALBUTEROL SULFATE 3 ML: .5; 3 SOLUTION RESPIRATORY (INHALATION) at 20:59

## 2020-12-17 RX ADMIN — SODIUM CHLORIDE 250 ML: 900 INJECTION, SOLUTION INTRAVENOUS at 22:38

## 2020-12-17 RX ADMIN — CLONIDINE HYDROCHLORIDE 0.2 MG: 0.1 TABLET ORAL at 20:59

## 2020-12-17 RX ADMIN — POTASSIUM CHLORIDE 40 MEQ: 1500 TABLET, EXTENDED RELEASE ORAL at 22:38

## 2020-12-17 RX ADMIN — LISINOPRIL 20 MG: 20 TABLET ORAL at 20:59

## 2020-12-18 VITALS
SYSTOLIC BLOOD PRESSURE: 128 MMHG | TEMPERATURE: 98.4 F | DIASTOLIC BLOOD PRESSURE: 53 MMHG | HEART RATE: 73 BPM | HEIGHT: 59 IN | BODY MASS INDEX: 34.27 KG/M2 | OXYGEN SATURATION: 100 % | WEIGHT: 170 LBS | RESPIRATION RATE: 14 BRPM

## 2020-12-18 LAB
ATRIAL RATE: 77 BPM
CALCULATED P AXIS, ECG09: 74 DEGREES
CALCULATED R AXIS, ECG10: 37 DEGREES
CALCULATED T AXIS, ECG11: 74 DEGREES
DIAGNOSIS, 93000: NORMAL
P-R INTERVAL, ECG05: 124 MS
Q-T INTERVAL, ECG07: 358 MS
QRS DURATION, ECG06: 86 MS
QTC CALCULATION (BEZET), ECG08: 405 MS
VENTRICULAR RATE, ECG03: 77 BPM

## 2020-12-18 PROCEDURE — 74011250636 HC RX REV CODE- 250/636: Performed by: EMERGENCY MEDICINE

## 2020-12-18 PROCEDURE — 74011250637 HC RX REV CODE- 250/637: Performed by: EMERGENCY MEDICINE

## 2020-12-18 PROCEDURE — 74011000250 HC RX REV CODE- 250: Performed by: EMERGENCY MEDICINE

## 2020-12-18 RX ORDER — ALBUTEROL SULFATE 90 UG/1
2 AEROSOL, METERED RESPIRATORY (INHALATION)
Qty: 1 INHALER | Refills: 0 | Status: ON HOLD | OUTPATIENT
Start: 2020-12-18 | End: 2020-12-25 | Stop reason: SDUPTHER

## 2020-12-18 RX ORDER — ALBUTEROL SULFATE 90 UG/1
2 AEROSOL, METERED RESPIRATORY (INHALATION)
Status: COMPLETED | OUTPATIENT
Start: 2020-12-18 | End: 2020-12-18

## 2020-12-18 RX ORDER — SODIUM CHLORIDE 9 MG/ML
250 INJECTION, SOLUTION INTRAVENOUS ONCE
Status: COMPLETED | OUTPATIENT
Start: 2020-12-18 | End: 2020-12-18

## 2020-12-18 RX ORDER — ALBUTEROL SULFATE 90 UG/1
2 AEROSOL, METERED RESPIRATORY (INHALATION)
Qty: 1 INHALER | Refills: 0 | Status: ON HOLD | OUTPATIENT
Start: 2020-12-18 | End: 2020-12-25

## 2020-12-18 RX ORDER — ALBUTEROL SULFATE 0.83 MG/ML
2.5 SOLUTION RESPIRATORY (INHALATION)
Qty: 30 NEBULE | Refills: 0 | Status: SHIPPED | OUTPATIENT
Start: 2020-12-18 | End: 2020-12-21

## 2020-12-18 RX ORDER — IPRATROPIUM BROMIDE AND ALBUTEROL SULFATE 2.5; .5 MG/3ML; MG/3ML
3 SOLUTION RESPIRATORY (INHALATION)
Status: COMPLETED | OUTPATIENT
Start: 2020-12-18 | End: 2020-12-18

## 2020-12-18 RX ORDER — ALBUTEROL SULFATE 1.25 MG/3ML
1.25 SOLUTION RESPIRATORY (INHALATION)
Qty: 25 EACH | Refills: 0 | Status: ON HOLD | OUTPATIENT
Start: 2020-12-18 | End: 2020-12-25 | Stop reason: SDUPTHER

## 2020-12-18 RX ADMIN — SODIUM CHLORIDE 250 ML: 900 INJECTION, SOLUTION INTRAVENOUS at 00:42

## 2020-12-18 RX ADMIN — ALBUTEROL SULFATE 2 PUFF: 108 INHALANT RESPIRATORY (INHALATION) at 03:43

## 2020-12-18 RX ADMIN — IPRATROPIUM BROMIDE AND ALBUTEROL SULFATE 3 ML: .5; 3 SOLUTION RESPIRATORY (INHALATION) at 01:34

## 2020-12-18 NOTE — ED NOTES
Patient up for discharge. Discharge results have been reviewed with patient by the Provider. Armband removed and shredded per policy. Encouraged to voice any concerns, and all concerns addressed. Patient discharged in stable condition with no apparent distress and awaiting her ride through [facility] transport. George Baker

## 2020-12-18 NOTE — ED NOTES
Resting quietly. No distress noted. Respirations equal and unlabored. Skins warm, dry, acyanotic. Arouses easily to verbal stimuli. Body indicators negative for pain or discomfort and patient watching TV. Will continue to monitor.

## 2020-12-18 NOTE — ED PROVIDER NOTES
HPI is a 77-year-old female no history of COPD, hypertension, anxiety presents to the ER with complaint of being short of breath. She was admitted to the hospital for 3 to 4 days because of exacerbation COPD and discharged yesterday. Patient states she does not feel any better since discharge and return to ER by ambulance. Past Medical History:   Diagnosis Date    [facility]congestive heart failure) [facility])     Chronic respiratory failure with hypoxia ([address] 9/7/2020    CKD (chronic kidney disease) stage 3, GFR 30-59 ml/min 10/31/2019    Cocaine abuse ([address] 11/26/2017    COPD (chronic obstructive pulmonary disease) with chronic bronchitis ([address] 12/19/2017    Dependence on supplemental oxygen     Depression 3/22/2020    Drug-seeking behavior 11/19/2016    Endocrine disease     thyroid issues    Essential hypertension 3/10/2017    Fe deficiency anemia 10/27/2012    Fibromyalgia 12/16/2016    Gastroesophageal reflux disease without esophagitis 10/10/2016    Gastrointestinal disorder     \"blockage in my stomach\"    Hypercholesterolemia     Hypertension     Hypertensive heart failure ([address] 12/19/2017    Morbid obesity due to excess calories ([address] 3/10/2017    NSTEMI (non-ST elevated myocardial infarction) ([address] 3/22/2020    On home O2 12/3/2015    Overview:  2L at home per pt for approx 8 years    Polysubstance abuse ([address] 9/15/2018    Respiratory failure ([address] 1/11/2017    S/P hernia repair 10/31/2019    Sleep apnea 12/19/2017    T wave inversion in EKG 3/9/2019    Tobacco use 2/24/6971    Uncomplicated severe persistent asthma 12/13/2016    Vocal cord disease 12/7/2016       Past Surgical History:   Procedure Laterality Date    HX GI      Exploratory laparotomy with lysis of adhesions and primary repair of incarcerated umbilical hernia         No family history on file.     Social History     Socioeconomic History    Marital status: SINGLE     Spouse name: Not on file    Number of children: Not on file    Years of education: Not on file    Highest education level: Not on file   Occupational History    Not on file   Social Needs    Financial resource strain: Not on file    Food insecurity     Worry: Not on file     Inability: Not on file    Transportation needs     Medical: Not on file     Non-medical: Not on file   Tobacco Use    Smoking status: Former Smoker     Packs/day: 0.25    Smokeless tobacco: Current User    Tobacco comment: Pt states she has not had money to buy cigarettes in the past month   Substance and Sexual Activity    Alcohol use: No     Comment: socially    Drug use: Not Currently     Types: Heroin     Comment: pt reports using approximately a month ago    Sexual activity: Not Currently     Comment: last used 9 years ago   Lifestyle    Physical activity     Days per week: Not on file     Minutes per session: Not on file    Stress: Not on file   Relationships    Social connections     Talks on phone: Not on file     Gets together: Not on file     Attends Congregation service: Not on file     Active member of club or organization: Not on file     Attends meetings of clubs or organizations: Not on file     Relationship status: Not on file    Intimate partner violence     Fear of current or ex partner: Not on file     Emotionally abused: Not on file     Physically abused: Not on file     Forced sexual activity: Not on file   Other Topics Concern    Not on file   Social History Narrative    Not on file         ALLERGIES: Patient has no known allergies. Review of Systems   Constitutional: Negative. HENT: Negative. Eyes: Negative. Respiratory: Negative. Cardiovascular: Negative. Gastrointestinal: Negative. Endocrine: Negative. Genitourinary: Negative. Musculoskeletal: Negative. Skin: Negative. Allergic/Immunologic: Negative. Neurological: Negative. Hematological: Negative. Psychiatric/Behavioral: Negative.     All other systems reviewed and are negative. Vitals:    12/17/20 1949 12/17/20 1958   BP: (!) 199/68    Pulse: 80    Resp: 22    Temp: 98.2 °F (36.8 °C)    SpO2: 98% 99%   Weight: 77.1 kg (170 lb)    Height: 4' 11\" (1.499 m)             Physical Exam  Vitals signs and nursing note reviewed. Constitutional:       General: She is not in acute distress. Appearance: She is well-developed. She is not diaphoretic. HENT:      Head: Normocephalic. Right Ear: External ear normal.      Left Ear: External ear normal.      Mouth/Throat:      Pharynx: No oropharyngeal exudate. Eyes:      General: No scleral icterus. Right eye: No discharge. Left eye: No discharge. Conjunctiva/sclera: Conjunctivae normal.      Pupils: Pupils are equal, round, and reactive to light. Neck:      Musculoskeletal: Normal range of motion and neck supple. Thyroid: No thyromegaly. Vascular: No JVD. Trachea: No tracheal deviation. Cardiovascular:      Rate and Rhythm: Normal rate and regular rhythm. Heart sounds: Normal heart sounds. No murmur. No friction rub. No gallop. Pulmonary:      Effort: No respiratory distress. Breath sounds: Normal breath sounds. No stridor. No wheezing or rales. Chest:      Chest wall: No tenderness. Abdominal:      General: Bowel sounds are normal. There is no distension. Palpations: Abdomen is soft. There is no mass. Tenderness: There is no abdominal tenderness. There is no guarding or rebound. Musculoskeletal: Normal range of motion. General: No tenderness. Lymphadenopathy:      Cervical: No cervical adenopathy. Skin:     General: Skin is warm and dry. Coloration: Skin is not pale. Findings: No erythema or rash. Neurological:      Mental Status: She is alert and oriented to person, place, and time. Cranial Nerves: No cranial nerve deficit. Motor: No abnormal muscle tone.       Coordination: Coordination normal.      Deep Tendon Reflexes: Reflexes normal.          MDM       Procedures    Hospital course: Patient was being worked up in the ER complaint of been short of breath. When she states she is short of breath she is very anxious. Observation of patient when she didn't know that she was being observes, the patient was very calm and breathing normal.  When she is sleeping she is breathing normal.  She only is short of breath when she gets anxious. Patient blood tests were normal.  Patient discharged home with follow-up with PCP. Dx: anxiety    Disp:  D/C home. F/U PCP in 3 to 4 days. Return to ER prn. Dictation disclaimer:  Please note that this dictation was completed with [employer], the computer voice recognition software. Quite often unanticipated grammatical, syntax, homophones, and other interpretive errors are inadvertently transcribed by the computer software. Please disregard these errors. Please excuse any errors that have escaped final proofreading.

## 2020-12-18 NOTE — ED TRIAGE NOTES
Pt recently hospitalized for several days for COPD exacerbation. Pt reports continued sob tonight. Received Duoneb tx via EMS.

## 2020-12-18 NOTE — ED NOTES
Assisted onto [facility] to void. Noted patient became very SOB with minimal exertion. O2 remains on at [facility].  Will inform Provider for neb treatment

## 2020-12-18 NOTE — ED NOTES
[facility] arrange through Medicaid. Report of patient given to [facility] team. [facility] team now at bedside with patient and preparing patient for transport.

## 2020-12-18 NOTE — ED NOTES
Medicaid cab called at [phone] to confirm patient can be transported with them with O2. And confirmation ride will come in to building to room and get patient. Confirmation # [id number]. And that there is a possible 3 hour wait. Patient informed.

## 2020-12-18 NOTE — ED NOTES
Noted patient moans out while sleeping; patient arouses easily to verbal stimul and states \"I just do that when I'm sleeping\". Denies any discomforts at this time.

## 2020-12-20 ENCOUNTER — HOSPITAL ENCOUNTER (EMERGENCY)
Age: 55
Discharge: HOME | End: 2020-12-21
Attending: EMERGENCY MEDICINE
Payer: MEDICAID

## 2020-12-20 ENCOUNTER — APPOINTMENT (OUTPATIENT)
Dept: GENERAL RADIOLOGY | Age: 55
End: 2020-12-20
Attending: EMERGENCY MEDICINE
Payer: MEDICAID

## 2020-12-20 DIAGNOSIS — J44.1 COPD WITH ACUTE EXACERBATION (HCC): Primary | ICD-10-CM

## 2020-12-20 LAB
ALBUMIN SERPL-MCNC: 3.2 G/DL (ref 3.4–5)
ALBUMIN/GLOB SERPL: 0.8 {RATIO} (ref 0.8–1.7)
ALP SERPL-CCNC: 82 U/L (ref 45–117)
ALT SERPL-CCNC: 20 U/L (ref 13–56)
ANION GAP SERPL CALC-SCNC: 3 MMOL/L (ref 3–18)
AST SERPL-CCNC: 8 U/L (ref 10–38)
ATRIAL RATE: 66 BPM
BASOPHILS # BLD: 0 K/UL (ref 0–0.1)
BASOPHILS NFR BLD: 0 % (ref 0–2)
BILIRUB SERPL-MCNC: 0.6 MG/DL (ref 0.2–1)
BNP SERPL-MCNC: 403 PG/ML (ref 0–900)
BUN SERPL-MCNC: 15 MG/DL (ref 7–18)
BUN/CREAT SERPL: 14 (ref 12–20)
CALCIUM SERPL-MCNC: 8.8 MG/DL (ref 8.5–10.1)
CALCULATED P AXIS, ECG09: 78 DEGREES
CALCULATED R AXIS, ECG10: 54 DEGREES
CALCULATED T AXIS, ECG11: 85 DEGREES
CHLORIDE SERPL-SCNC: 107 MMOL/L (ref 100–111)
CK MB CFR SERPL CALC: 3.1 % (ref 0–4)
CK MB SERPL-MCNC: 1.8 NG/ML (ref 5–25)
CK SERPL-CCNC: 58 U/L (ref 26–192)
CO2 SERPL-SCNC: 31 MMOL/L (ref 21–32)
CREAT SERPL-MCNC: 1.07 MG/DL (ref 0.6–1.3)
DIAGNOSIS, 93000: NORMAL
DIFFERENTIAL METHOD BLD: ABNORMAL
EOSINOPHIL # BLD: 0.2 K/UL (ref 0–0.4)
EOSINOPHIL NFR BLD: 2 % (ref 0–5)
ERYTHROCYTE [DISTWIDTH] IN BLOOD BY AUTOMATED COUNT: 12.5 % (ref 11.6–14.5)
GLOBULIN SER CALC-MCNC: 3.8 G/DL (ref 2–4)
GLUCOSE SERPL-MCNC: 108 MG/DL (ref 74–99)
HCT VFR BLD AUTO: 38.3 % (ref 35–45)
HGB BLD-MCNC: 12 G/DL (ref 12–16)
LYMPHOCYTES # BLD: 1.9 K/UL (ref 0.9–3.6)
LYMPHOCYTES NFR BLD: 23 % (ref 21–52)
MAGNESIUM SERPL-MCNC: 2.1 MG/DL (ref 1.6–2.6)
MCH RBC QN AUTO: 29 PG (ref 24–34)
MCHC RBC AUTO-ENTMCNC: 31.3 G/DL (ref 31–37)
MCV RBC AUTO: 92.5 FL (ref 74–97)
MONOCYTES # BLD: 0.5 K/UL (ref 0.05–1.2)
MONOCYTES NFR BLD: 6 % (ref 3–10)
NEUTS SEG # BLD: 5.6 K/UL (ref 1.8–8)
NEUTS SEG NFR BLD: 69 % (ref 40–73)
P-R INTERVAL, ECG05: 120 MS
PLATELET # BLD AUTO: 229 K/UL (ref 135–420)
PMV BLD AUTO: 10.6 FL (ref 9.2–11.8)
POTASSIUM SERPL-SCNC: 4.3 MMOL/L (ref 3.5–5.5)
PROT SERPL-MCNC: 7 G/DL (ref 6.4–8.2)
Q-T INTERVAL, ECG07: 382 MS
QRS DURATION, ECG06: 80 MS
QTC CALCULATION (BEZET), ECG08: 400 MS
RBC # BLD AUTO: 4.14 M/UL (ref 4.2–5.3)
SODIUM SERPL-SCNC: 141 MMOL/L (ref 136–145)
TROPONIN I SERPL-MCNC: <0.02 NG/ML (ref 0–0.04)
VENTRICULAR RATE, ECG03: 66 BPM
WBC # BLD AUTO: 8.1 K/UL (ref 4.6–13.2)

## 2020-12-20 PROCEDURE — 96375 TX/PRO/DX INJ NEW DRUG ADDON: CPT

## 2020-12-20 PROCEDURE — 96366 THER/PROPH/DIAG IV INF ADDON: CPT

## 2020-12-20 PROCEDURE — 93005 ELECTROCARDIOGRAM TRACING: CPT

## 2020-12-20 PROCEDURE — 85025 COMPLETE CBC W/AUTO DIFF WBC: CPT

## 2020-12-20 PROCEDURE — 74011250636 HC RX REV CODE- 250/636: Performed by: EMERGENCY MEDICINE

## 2020-12-20 PROCEDURE — 83880 ASSAY OF NATRIURETIC PEPTIDE: CPT

## 2020-12-20 PROCEDURE — 77010033678 HC OXYGEN DAILY

## 2020-12-20 PROCEDURE — 94640 AIRWAY INHALATION TREATMENT: CPT

## 2020-12-20 PROCEDURE — 82553 CREATINE MB FRACTION: CPT

## 2020-12-20 PROCEDURE — 71045 X-RAY EXAM CHEST 1 VIEW: CPT

## 2020-12-20 PROCEDURE — 80053 COMPREHEN METABOLIC PANEL: CPT

## 2020-12-20 PROCEDURE — 96365 THER/PROPH/DIAG IV INF INIT: CPT

## 2020-12-20 PROCEDURE — 83735 ASSAY OF MAGNESIUM: CPT

## 2020-12-20 PROCEDURE — 99285 EMERGENCY DEPT VISIT HI MDM: CPT

## 2020-12-20 PROCEDURE — 74011000250 HC RX REV CODE- 250: Performed by: EMERGENCY MEDICINE

## 2020-12-20 RX ORDER — IPRATROPIUM BROMIDE AND ALBUTEROL SULFATE 2.5; .5 MG/3ML; MG/3ML
3 SOLUTION RESPIRATORY (INHALATION)
Status: COMPLETED | OUTPATIENT
Start: 2020-12-20 | End: 2020-12-20

## 2020-12-20 RX ORDER — MAGNESIUM SULFATE HEPTAHYDRATE 40 MG/ML
2 INJECTION, SOLUTION INTRAVENOUS ONCE
Status: COMPLETED | OUTPATIENT
Start: 2020-12-20 | End: 2020-12-20

## 2020-12-20 RX ORDER — MAGNESIUM SULFATE HEPTAHYDRATE 500 MG/ML
2 INJECTION, SOLUTION INTRAMUSCULAR; INTRAVENOUS
Status: DISCONTINUED | OUTPATIENT
Start: 2020-12-20 | End: 2020-12-20

## 2020-12-20 RX ORDER — PREDNISONE 50 MG/1
50 TABLET ORAL DAILY
Qty: 5 TAB | Refills: 0 | Status: ON HOLD | OUTPATIENT
Start: 2020-12-20 | End: 2020-12-25 | Stop reason: SDUPTHER

## 2020-12-20 RX ADMIN — MAGNESIUM SULFATE HEPTAHYDRATE 2 G: 40 INJECTION, SOLUTION INTRAVENOUS at 07:47

## 2020-12-20 RX ADMIN — IPRATROPIUM BROMIDE AND ALBUTEROL SULFATE 3 ML: .5; 3 SOLUTION RESPIRATORY (INHALATION) at 07:47

## 2020-12-20 RX ADMIN — METHYLPREDNISOLONE SODIUM SUCCINATE 125 MG: 125 INJECTION, POWDER, FOR SOLUTION INTRAMUSCULAR; INTRAVENOUS at 07:47

## 2020-12-20 NOTE — ED NOTES
Ambulated with patient to the nurses' station and back to her room (approximately 150 ft). She complained of feeling \"short of breath\". Her oxygen saturation on RA was 84-92%. Dr. Fabian Altamirano aware. No new order received at this time.

## 2020-12-20 NOTE — ED NOTES
Patient is being verbally aggressive towards this nurse. Patient states she does not want this nurse in her room. Patient was spoken to in regards to her care with doctor at bedside. Patient in safe conditions with call light within reach. Hourly rounds being made.

## 2020-12-20 NOTE — ED PROVIDER NOTES
HPI   58-year-old female with past medical history of congestive heart failure, COPD on home O2 3 to 4 L via nasal cannula, and cocaine abuse resents with a chief complaint of wheezing, chest tightness, and shortness of breath which is typical of her COPD. Patient with multiple ED visits and admissions for similar complaint. She states she was discharged from this hospital 2 days ago for COPD exacerbation. Patient states that she continued to experience her symptoms when she was discharged from the hospital.  She states that she stopped smoking 2 months ago. She denies any fever, diaphoresis, abdominal pain, vomiting, diarrhea, sore throat, runny nose, or nasal congestion.       Past Medical History:   Diagnosis Date    CHF (congestive heart failure) (HCC)     Chronic respiratory failure with hypoxia ([address] 9/7/2020    CKD (chronic kidney disease) stage 3, GFR 30-59 ml/min 10/31/2019    Cocaine abuse ([address] 11/26/2017    COPD (chronic obstructive pulmonary disease) with chronic bronchitis ([address] 12/19/2017    Dependence on supplemental oxygen     Depression 3/22/2020    Drug-seeking behavior 11/19/2016    Endocrine disease     thyroid issues    Essential hypertension 3/10/2017    Fe deficiency anemia 10/27/2012    Fibromyalgia 12/16/2016    Gastroesophageal reflux disease without esophagitis 10/10/2016    Gastrointestinal disorder     \"blockage in my stomach\"    Hypercholesterolemia     Hypertension     Hypertensive heart failure ([address] 12/19/2017    Morbid obesity due to excess calories ([address] 3/10/2017    NSTEMI (non-ST elevated myocardial infarction) ([address] 3/22/2020    On home O2 12/3/2015    Overview:  2L at home per pt for approx 8 years    Polysubstance abuse ([address] 9/15/2018    Respiratory failure ([address] 1/11/2017    S/P hernia repair 10/31/2019    Sleep apnea 12/19/2017    T wave inversion in EKG 3/9/2019    Tobacco use 8/96/0854    Uncomplicated severe persistent asthma 12/13/2016    Vocal cord disease 12/7/2016       Past Surgical History:   Procedure Laterality Date    HX GI      Exploratory laparotomy with lysis of adhesions and primary repair of incarcerated umbilical hernia         History reviewed. No pertinent family history. Social History     Socioeconomic History    Marital status: SINGLE     Spouse name: Not on file    Number of children: Not on file    Years of education: Not on file    Highest education level: Not on file   Occupational History    Not on file   Social Needs    Financial resource strain: Not on file    Food insecurity     Worry: Not on file     Inability: Not on file    Transportation needs     Medical: Not on file     Non-medical: Not on file   Tobacco Use    Smoking status: Former Smoker     Packs/day: 0.25    Smokeless tobacco: Current User    Tobacco comment: Pt states she has not had money to buy cigarettes in the past month   Substance and Sexual Activity    Alcohol use: No     Comment: socially    Drug use: Not Currently     Types: Heroin     Comment: pt reports using approximately a month ago    Sexual activity: Not Currently     Comment: last used 9 years ago   Lifestyle    Physical activity     Days per week: Not on file     Minutes per session: Not on file    Stress: Not on file   Relationships    Social connections     Talks on phone: Not on file     Gets together: Not on file     Attends Gnosticist service: Not on file     Active member of club or organization: Not on file     Attends meetings of clubs or organizations: Not on file     Relationship status: Not on file    Intimate partner violence     Fear of current or ex partner: Not on file     Emotionally abused: Not on file     Physically abused: Not on file     Forced sexual activity: Not on file   Other Topics Concern    Not on file   Social History Narrative    Not on file         ALLERGIES: Patient has no known allergies.     Review of Systems   Constitutional: Negative for chills, diaphoresis, fatigue, fever and unexpected weight change. HENT: Negative for congestion, dental problem, ear discharge, ear pain, hearing loss, nosebleeds, postnasal drip, sinus pressure and sore throat. Eyes: Negative for pain, discharge and redness. Respiratory: Positive for chest tightness, shortness of breath and wheezing. Negative for cough and stridor. Cardiovascular: Negative for chest pain, palpitations and leg swelling. Gastrointestinal: Negative for abdominal distention, abdominal pain, anal bleeding, blood in stool, constipation, diarrhea, nausea and vomiting. Endocrine: Negative for polydipsia, polyphagia and polyuria. Genitourinary: Negative for difficulty urinating, dysuria, flank pain, frequency, hematuria, pelvic pain, urgency, vaginal bleeding and vaginal discharge. Musculoskeletal: Negative for arthralgias, back pain, joint swelling, myalgias, neck pain and neck stiffness. Skin: Negative for color change, rash and wound. Allergic/Immunologic: Negative for food allergies and immunocompromised state. Neurological: Negative for dizziness, tremors, seizures, syncope, weakness, light-headedness, numbness and headaches. Hematological: Negative for adenopathy. Does not bruise/bleed easily. Psychiatric/Behavioral: Negative for agitation, confusion, decreased concentration, hallucinations, sleep disturbance and suicidal ideas. The patient is not nervous/anxious. All other systems reviewed and are negative. Vitals:    12/20/20 0830 12/20/20 0845 12/20/20 0900 12/20/20 0915   BP: (!) 135/41 (!) 127/48 91/76 (!) 126/57   Pulse: 73 71 71 86   Resp: 17 16 19 19   Temp:       SpO2:                Physical Exam  Vitals signs and nursing note reviewed. Constitutional:       General: She is not in acute distress. Appearance: Normal appearance. She is well-developed. She is not diaphoretic. HENT:      Head: Normocephalic and atraumatic.       Right Ear: External ear normal.      Left Ear: External ear normal.      Nose: Nose normal.      Mouth/Throat:      Pharynx: No oropharyngeal exudate. Eyes:      General: No scleral icterus. Right eye: No discharge. Left eye: No discharge. Extraocular Movements: Extraocular movements intact. Conjunctiva/sclera: Conjunctivae normal.      Pupils: Pupils are equal, round, and reactive to light. Neck:      Musculoskeletal: Normal range of motion and neck supple. Thyroid: No thyromegaly. Vascular: No JVD. Trachea: No tracheal deviation. Cardiovascular:      Rate and Rhythm: Normal rate and regular rhythm. Heart sounds: Normal heart sounds. No murmur. No friction rub. No gallop. Pulmonary:      Effort: No respiratory distress. Breath sounds: No stridor. Wheezing present. No rales. Comments: Tachypnea, mild bilateral expiratory wheezing  Chest:      Chest wall: No tenderness. Abdominal:      General: Bowel sounds are normal. There is no distension. Palpations: Abdomen is soft. There is no mass. Tenderness: There is no abdominal tenderness. There is no guarding or rebound. Genitourinary:     Vagina: Normal.   Musculoskeletal: Normal range of motion. General: No tenderness. Lymphadenopathy:      Cervical: No cervical adenopathy. Skin:     General: Skin is warm and dry. Capillary Refill: Capillary refill takes less than 2 seconds. Coloration: Skin is not jaundiced or pale. Findings: No erythema or rash. Neurological:      General: No focal deficit present. Mental Status: She is alert and oriented to person, place, and time. Mental status is at baseline. Cranial Nerves: No cranial nerve deficit. Sensory: No sensory deficit. Deep Tendon Reflexes: Reflexes are normal and symmetric. Psychiatric:         Mood and Affect: Mood normal.         Behavior: Behavior normal.         Thought Content:  Thought content normal. Judgment: Judgment normal.          MDM  Number of Diagnoses or Management Options  COPD with acute exacerbation ([facility]  Diagnosis management comments: Differential diagnosis includes: COPD, Acute Bronchitis, Pneumonia, CHF.          Amount and/or Complexity of Data Reviewed  Clinical lab tests: reviewed and ordered  Tests in the radiology section of CPT®: ordered and reviewed  Tests in the medicine section of CPT®: ordered and reviewed  Discussion of test results with the performing providers: yes  Decide to obtain previous medical records or to obtain history from someone other than the patient: yes  Obtain history from someone other than the patient: yes  Review and summarize past medical records: yes  Discuss the patient with other providers: yes  Independent visualization of images, tracings, or specimens: yes    Risk of Complications, Morbidity, and/or Mortality  Presenting problems: high  Diagnostic procedures: high  Management options: high    Patient Progress  Patient progress: stable         Procedures    Orders Placed This Encounter    XR CHEST PORT     Standing Status:   Standing     Number of Occurrences:   1     Order Specific Question:   Reason for Exam     Answer:   Dyspnea    CBC WITH AUTOMATED DIFF     Standing Status:   Standing     Number of Occurrences:   1    COMPREHENSIVE METABOLIC PANEL     Standing Status:   Standing     Number of Occurrences:   1    PRO-BNP     Standing Status:   Standing     Number of Occurrences:   1    CARDIAC PANEL,(CK, CKMB & TROPONIN)     Standing Status:   Standing     Number of Occurrences:   1    MAGNESIUM     Standing Status:   Standing     Number of Occurrences:   1    DIET REGULAR     Standing Status:   Standing     Number of Occurrences:   1    EKG, 12 LEAD, INITIAL     Standing Status:   Standing     Number of Occurrences:   1     Order Specific Question:   Reason for Exam:     Answer:   Dyspnea    SALINE LOCK IV ONE TIME STAT     Standing Status: Standing     Number of Occurrences:   1    DISCONTD: magnesium sulfate injection 2 g    methylPREDNISolone (PF) (Solu-MEDROL) injection 125 mg    albuterol-ipratropium (DUO-NEB) 2.5 MG-0.5 MG/3 ML     Order Specific Question:   MODE OF DELIVERY     Answer:   Nebulizer    magnesium sulfate 2 g/50 ml IVPB (premix or compounded)    predniSONE (DELTASONE) 50 mg tablet     Sig: Take 1 Tab by mouth daily for 5 days. Dispense:  5 Tab     Refill:  0     Recent Results (from the past 12 hour(s))   EKG, 12 LEAD, INITIAL    Collection Time: 12/20/20  7:35 AM   Result Value Ref Range    Ventricular Rate 66 BPM    Atrial Rate 66 BPM    P-R Interval 120 ms    QRS Duration 80 ms    Q-T Interval 382 ms    QTC Calculation (Bezet) 400 ms    Calculated P Axis 78 degrees    Calculated R Axis 54 degrees    Calculated T Axis 85 degrees    Diagnosis       Normal sinus rhythm  Minimal voltage criteria for LVH, may be normal variant  ST & T wave abnormality, consider lateral ischemia  Abnormal ECG  When compared with ECG of 17-DEC-2020 19:54,  Inverted T waves have replaced nonspecific T wave abnormality in Lateral   leads  Confirmed by Beto Camarillo MD, --- ([id number]) on 12/20/2020 9:23:41 AM     CBC WITH AUTOMATED DIFF    Collection Time: 12/20/20  7:45 AM   Result Value Ref Range    WBC 8.1 4.6 - 13.2 K/uL    RBC 4.14 (L) 4.20 - 5.30 M/uL    HGB 12.0 12.0 - 16.0 g/dL    HCT 38.3 35.0 - 45.0 %    MCV 92.5 74.0 - 97.0 FL    MCH 29.0 24.0 - 34.0 PG    MCHC 31.3 31.0 - 37.0 g/dL    RDW 12.5 11.6 - 14.5 %    PLATELET 036 363 - 672 K/uL    MPV 10.6 9.2 - 11.8 FL    NEUTROPHILS 69 40 - 73 %    LYMPHOCYTES 23 21 - 52 %    MONOCYTES 6 3 - 10 %    EOSINOPHILS 2 0 - 5 %    BASOPHILS 0 0 - 2 %    ABS. NEUTROPHILS 5.6 1.8 - 8.0 K/UL    ABS. LYMPHOCYTES 1.9 0.9 - 3.6 K/UL    ABS. MONOCYTES 0.5 0.05 - 1.2 K/UL    ABS. EOSINOPHILS 0.2 0.0 - 0.4 K/UL    ABS.  BASOPHILS 0.0 0.0 - 0.1 K/UL    DF AUTOMATED     METABOLIC PANEL, COMPREHENSIVE    Collection Time: 12/20/20  7:45 AM   Result Value Ref Range    Sodium 141 136 - 145 mmol/L    Potassium 4.3 3.5 - 5.5 mmol/L    Chloride 107 100 - 111 mmol/L    CO2 31 21 - 32 mmol/L    Anion gap 3 3.0 - 18 mmol/L    Glucose 108 (H) 74 - 99 mg/dL    BUN 15 7.0 - 18 MG/DL    Creatinine 1.07 0.6 - 1.3 MG/DL    BUN/Creatinine ratio 14 12 - 20      GFR est AA >60 >60 ml/min/1.73m2    GFR est non-AA 53 (L) >60 ml/min/1.73m2    Calcium 8.8 8.5 - 10.1 MG/DL    Bilirubin, total 0.6 0.2 - 1.0 MG/DL    ALT (SGPT) 20 13 - 56 U/L    AST (SGOT) 8 (L) 10 - 38 U/L    Alk. phosphatase 82 45 - 117 U/L    Protein, total 7.0 6.4 - 8.2 g/dL    Albumin 3.2 (L) 3.4 - 5.0 g/dL    Globulin 3.8 2.0 - 4.0 g/dL    A-G Ratio 0.8 0.8 - 1.7     NT-PRO BNP    Collection Time: 12/20/20  7:45 AM   Result Value Ref Range    NT pro- 0 - 900 PG/ML   CARDIAC PANEL,(CK, CKMB & TROPONIN)    Collection Time: 12/20/20  7:45 AM   Result Value Ref Range    CK - MB 1.8 <3.6 ng/ml    CK-MB Index 3.1 0.0 - 4.0 %    CK 58 26 - 192 U/L    Troponin-I, QT <0.02 0.0 - 0.045 NG/ML   MAGNESIUM    Collection Time: 12/20/20  7:45 AM   Result Value Ref Range    Magnesium 2.1 1.6 - 2.6 mg/dL     XR CHEST PORT   Final Result   IMPRESSION:      Mild infectious process versus atelectasis right lung base. Hypoinflation limits   exam.        1:50 PM - Patient ambulated in the emergency room on oxygen without significant desaturation. She reports that she does not have an oxygen tank at home. We will arrange for oxygen to be delivered to her home. 2:12 PM - Patient states that she would like to stay overnight. I consulted the Hospitalist and spoke with the nursing supervisor       Patient was seen by the Hospitalist who agrees that the patient is stable for discharge home    Diagnosis:   1.  COPD with acute exacerbation ([address]          Disposition: Discharge home    Follow-up Information     Follow up With Specialties Details Why Contact Mari Gaitan NP Nurse Practitioner Schedule an appointment as soon as possible for a visit in 1 day  [address] [address]  [phone]      [facility] EMERGENCY DEPT Emergency Medicine  As needed, If symptoms worsen [address] [address]  [phone]          Patient's Medications   Start Taking    PREDNISONE (DELTASONE) 50 MG TABLET    Take 1 Tab by mouth daily for 5 days. Continue Taking    ALBUTEROL (ACCUNEB) 1.25 MG/3 ML NEBU    Take 3 mL by inhalation every four (4) hours as needed for Wheezing (wheezing). ALBUTEROL (PROVENTIL HFA) 90 MCG/ACTUATION INHALER    Take 2 Puffs by inhalation every four (4) hours as needed for Wheezing. ALBUTEROL (PROVENTIL HFA, VENTOLIN HFA, PROAIR HFA) 90 MCG/ACTUATION INHALER    Take 2 Puffs by inhalation every four (4) hours as needed for Wheezing. ALBUTEROL (PROVENTIL VENTOLIN) 2.5 MG /3 ML (0.083 %) NEBU    3 mL by Nebulization route every six (6) hours as needed for Wheezing, Shortness of Breath or Respiratory Distress. AMLODIPINE (NORVASC) 10 MG TABLET    Take 10 mg by mouth daily. ASPIRIN DELAYED-RELEASE 81 MG TABLET    Take 1 Tab by mouth daily. ATORVASTATIN (LIPITOR) 20 MG TABLET    Take 1 Tab by mouth nightly. CITALOPRAM (CELEXA) 20 MG TABLET    Take 1 Tab by mouth every morning. CLONIDINE HCL (CATAPRES) 0.2 MG TABLET    Take 0.2 mg by mouth three (3) times daily. FAMOTIDINE (PEPCID) 20 MG TABLET    Take 1 Tab by mouth daily. FLUTICASONE FUROATE-VILANTEROL (BREO ELLIPTA) 200-25 MCG/DOSE INHALER    Take 1 Puff by inhalation daily. LISINOPRIL (PRINIVIL, ZESTRIL) 20 MG TABLET    Take 20 mg by mouth daily. MONTELUKAST (SINGULAIR) 10 MG TABLET    Take 1 Tab by mouth nightly. Indications: controller medication for asthma    NALOXONE (NARCAN) 4 MG/ACTUATION NASAL SPRAY    Use 1 spray intranasally, then discard. Repeat with new spray every 2 min as needed for opioid overdose symptoms, alternating nostrils.     NITROGLYCERIN (NITROSTAT) 0.4 MG SL TABLET    Take 1 Tab by mouth every five (5) minutes as needed for Chest Pain. Sit/Lay down then put one tab under the tongue every 5 minutes as needed for chest pain for 3 doses    OXYGEN-AIR DELIVERY SYSTEMS    3 L by IntraNASal route as needed for Other (sob). ROFLUMILAST (DALIRESP) 500 MCG TAB TABLET    Take 1 Tab by mouth daily. These Medications have changed    No medications on file   Stop Taking    PREDNISONE (DELTASONE) 20 MG TABLET    Take 40 mg by mouth daily for 4 days.

## 2020-12-20 NOTE — PROGRESS NOTES
This writer received call from [employer] concerning patient out of home oxygen and need oxygen delivered to patient's home. PETE called [employer] and spoke with Johanny Eden with answering service. PETE waiting for call back from [facility]. 3:54 pm. This writer spoke with Johanny Eden with [employer] several times concerning status of delivery of oxygen. She stated \" Galenisabella Oscar is back up with calls. He will contact you as soon as he can. \"  PETE notified Latanya Tee RN.      CHARITO AdamsonN, RN  Pager # [phone]  Care Manager

## 2020-12-21 VITALS
DIASTOLIC BLOOD PRESSURE: 58 MMHG | RESPIRATION RATE: 18 BRPM | OXYGEN SATURATION: 100 % | TEMPERATURE: 97.5 F | SYSTOLIC BLOOD PRESSURE: 151 MMHG | HEART RATE: 87 BPM

## 2020-12-21 RX ORDER — ALBUTEROL SULFATE 0.83 MG/ML
2.5 SOLUTION RESPIRATORY (INHALATION)
Qty: 30 NEBULE | Refills: 0 | Status: ON HOLD | OUTPATIENT
Start: 2020-12-21 | End: 2020-12-25

## 2020-12-21 NOTE — ED NOTES
Remained in the emergency department. She was seen yesterday for exacerbation of COPD has been on home oxygen for some time we are trying to arrange for home oxygen as an outpatient unfortunately apparently the order has . I discussed the case with Dr. Zenobia Harmon from pulmonology regarding to do walk test in the emergency department apparently yesterday when she walked she had the oxygen desaturations however today we are unable to document hypoxia on walking. Patient is having some audible wheezing she is short of breath this is baseline for her I have seen her many times she is already said that she is going to return if we do not give her home oxygen unfortunately this point I am in a bind because she is not hypoxic. PCP is going to write for the home oxygen    Patient has a right hearing response with work-up the ER without any difficulty we will send her prescriptions to her pharmacy for albuterol Cymetra is already been written.   Her oxygen is going to be delivered to her house she was in no distress on leaving

## 2020-12-21 NOTE — PROGRESS NOTES
Call made to [facility], spoke with Parker Campbell, she stated that patient should call to let them know how many tanks she needs. [facility] patient will need a new Oxygen order, her current order  in 2020. Md and nurses made aware. Will call Pcp's office about ordering Oxygen.

## 2020-12-21 NOTE — PROGRESS NOTES
Mark Rain from [address] [place] [address] returned my call, they have not received a new oxygen order from patient's pcp as of yet, she will call me once they get it.

## 2020-12-21 NOTE — PROGRESS NOTES
Spoke with Cristóbal Sanchez from [facility]. [address], they received patient's oxygen order, she will have oxygen delivered to patient's home.

## 2020-12-21 NOTE — PROGRESS NOTES
Follow up call made to [address] about patient's oxygen order, Nurse Odilia Klien has not heard back from Dr. Shira Brady on patient's oxygen order.

## 2020-12-21 NOTE — PROGRESS NOTES
conducted an initial consultation and Spiritual Assessment for Sun Chavez, who is a 63 y.o.,female. Patient's Primary Language is: [place]. According to the patient's EMR Lutheran Affiliation is: [place].      The reason the Patient came to the hospital is:   Patient Active Problem List    Diagnosis Date Noted    Acute bronchitis with chronic obstructive pulmonary disease (COPD) ([address] 11/28/2020    Asthma exacerbation 11/12/2020    COPD (chronic obstructive pulmonary disease) ([address] 11/12/2020    UTI (urinary tract infection) 10/08/2020    Atypical chest pain 10/08/2020    DEMARCUS (acute kidney injury) ([address] 10/08/2020    Acute exacerbation of chronic obstructive pulmonary disease (COPD) ([address] 09/14/2020    COPD with acute exacerbation ([address] 09/07/2020    Chronic respiratory failure with hypoxia ([address] 09/07/2020    Acute bronchitis 09/07/2020    Suspected COVID-19 virus infection 09/07/2020    Tachycardia 09/07/2020    Normocytic anemia 09/07/2020    Acute respiratory distress 09/07/2020    COPD exacerbation ([address] 09/07/2020    Depression 03/22/2020    S/P hernia repair 10/31/2019    CKD (chronic kidney disease) stage 3, GFR 30-59 ml/min 10/31/2019    Tobacco use 09/28/2019    T wave inversion in EKG 03/09/2019    HLD (hyperlipidemia) 09/15/2018    Polysubstance abuse ([address] 09/15/2018    Hypertensive heart failure ([address] 12/19/2017    Sleep apnea 12/19/2017    COPD (chronic obstructive pulmonary disease) with chronic bronchitis ([address] 12/19/2017    Cocaine abuse ([address] 11/26/2017    Essential hypertension 03/10/2017    Morbid obesity due to excess calories ([address] 03/10/2017    Acute exacerbation of COPD with asthma ([address] 03/02/2017    Respiratory failure ([address] 01/11/2017    Fibromyalgia 30/55/9855    Uncomplicated severe persistent asthma 12/13/2016    Vocal cord disease 12/07/2016    Drug-seeking behavior 11/19/2016    Gastroesophageal reflux disease without esophagitis 10/10/2016    Thyroid goiter 06/30/2016    Asthma 03/05/2016    On home O2 12/03/2015    Abnormal CT of the chest 11/20/2015    MDRO (multiple drug resistant organisms) resistance 11/08/2012    Fe deficiency anemia 10/27/2012        The  provided the following Interventions:  Initiated a relationship of care and support. Explored issues of marilee, belief, spirituality and Scientologist/ritual needs while hospitalized. Listened empathically. Provided chaplaincy education. Provided information about Spiritual Care Services. Offered prayer and assurance of continued prayers on patient's behalf. Chart reviewed. The following outcomes where achieved:  Patient shared limited information about both their medical narrative and spiritual journey/beliefs.  confirmed Patient's Pentecostalism Affiliation. Patient processed feeling about current hospitalization. Patient expressed gratitude for 's visit. Assessment:  Patient does not have any Scientologist/cultural needs that will affect patient's preferences in health care. There are no spiritual or Scientologist issues which require intervention at this time. Plan:  Chaplains will continue to follow and will provide pastoral care on an as needed/requested basis.  recommends bedside caregivers page  on duty if patient shows signs of acute spiritual or emotional distress.     [facility]   [phone]

## 2020-12-21 NOTE — PROGRESS NOTES
Call made to [facility] [phone] to see if Pcp sent new oxygen order to [place], Nurse Jacey Muñoz will retrun my call, she is with a patient.

## 2020-12-21 NOTE — PROGRESS NOTES
Call made to [phone], there is no new oxygen order at this time. Terrie or Ritika Ennis will return my call.

## 2020-12-21 NOTE — PROGRESS NOTES
Met with pt at bedside with ED RN. Pt was aware that PCP was ordering home oxygen. Pt was told to wait for oxygen to be delivered to ED  however, pt stated she was leaving. Reviewed chart. HH order from the 12/16. Pt is not home bound.    Ju Vázquez, MSDEEPAK, [place]- [phone]

## 2020-12-21 NOTE — PROGRESS NOTES
Spoke with made her aware that she has an appointment with Dr. Senthil Beard on 12/23/2020 at 9:00 am, patient stated that she did not know about her appointment, her  handles all of that.

## 2020-12-21 NOTE — ED NOTES
Ambulated pt around the department. Pts oxygen level stayed between 92-99% on room air. Pt does have audible expiatory wheezes. Provider notified.

## 2020-12-21 NOTE — PROGRESS NOTES
Dr. Brea Greco returned my call, made him aware that patient's oxygen order  in 2020, Alejandro Dia stated patient will need a new oxygen order, fax number to [employer] provided to Dr. Brea Greco, he will fax oxygen order.

## 2020-12-21 NOTE — ED NOTES
[place]ia Becerra called from case management about the patient o2  States that she will call me back.

## 2020-12-21 NOTE — PROGRESS NOTES
Call made to Pcp's office, [facility] [phone], spoke with Estela Pickens, she will make Dr. Rhonda Juárez aware that patient is in need of a new oxygen order, Made Teresa aware that patient's oxygen company is [employer].

## 2020-12-22 ENCOUNTER — APPOINTMENT (OUTPATIENT)
Dept: GENERAL RADIOLOGY | Age: 55
End: 2020-12-22
Attending: EMERGENCY MEDICINE
Payer: MEDICAID

## 2020-12-22 ENCOUNTER — HOSPITAL ENCOUNTER (EMERGENCY)
Age: 55
Discharge: LEFT BEFORE DISCHARGE | End: 2020-12-22
Attending: EMERGENCY MEDICINE
Payer: MEDICAID

## 2020-12-22 ENCOUNTER — HOSPITAL ENCOUNTER (OUTPATIENT)
Age: 55
Setting detail: OBSERVATION
Discharge: HOME HEALTH | End: 2020-12-25
Attending: EMERGENCY MEDICINE | Admitting: STUDENT IN AN ORGANIZED HEALTH CARE EDUCATION/TRAINING PROGRAM
Payer: MEDICAID

## 2020-12-22 VITALS
HEART RATE: 101 BPM | TEMPERATURE: 97.3 F | SYSTOLIC BLOOD PRESSURE: 167 MMHG | DIASTOLIC BLOOD PRESSURE: 82 MMHG | RESPIRATION RATE: 18 BRPM | OXYGEN SATURATION: 100 %

## 2020-12-22 DIAGNOSIS — I50.9 ACUTE DECOMPENSATED HEART FAILURE (HCC): ICD-10-CM

## 2020-12-22 DIAGNOSIS — J45.901 ASTHMA WITH ACUTE EXACERBATION, UNSPECIFIED ASTHMA SEVERITY, UNSPECIFIED WHETHER PERSISTENT: Primary | ICD-10-CM

## 2020-12-22 DIAGNOSIS — R07.9 CHEST PAIN, UNSPECIFIED TYPE: Primary | ICD-10-CM

## 2020-12-22 LAB
ALBUMIN SERPL-MCNC: 2.9 G/DL (ref 3.4–5)
ALBUMIN/GLOB SERPL: 0.7 {RATIO} (ref 0.8–1.7)
ALP SERPL-CCNC: 77 U/L (ref 45–117)
ALT SERPL-CCNC: 23 U/L (ref 13–56)
ANION GAP SERPL CALC-SCNC: 0 MMOL/L (ref 3–18)
AST SERPL-CCNC: 11 U/L (ref 10–38)
BASOPHILS # BLD: 0 K/UL (ref 0–0.1)
BASOPHILS # BLD: 0 K/UL (ref 0–0.1)
BASOPHILS NFR BLD: 0 % (ref 0–2)
BASOPHILS NFR BLD: 0 % (ref 0–2)
BILIRUB SERPL-MCNC: 0.2 MG/DL (ref 0.2–1)
BUN SERPL-MCNC: 19 MG/DL (ref 7–18)
BUN/CREAT SERPL: 16 (ref 12–20)
CALCIUM SERPL-MCNC: 8.8 MG/DL (ref 8.5–10.1)
CHLORIDE SERPL-SCNC: 110 MMOL/L (ref 100–111)
CO2 SERPL-SCNC: 33 MMOL/L (ref 21–32)
CREAT SERPL-MCNC: 1.17 MG/DL (ref 0.6–1.3)
DIFFERENTIAL METHOD BLD: ABNORMAL
DIFFERENTIAL METHOD BLD: ABNORMAL
EOSINOPHIL # BLD: 0 K/UL (ref 0–0.4)
EOSINOPHIL # BLD: 0.1 K/UL (ref 0–0.4)
EOSINOPHIL NFR BLD: 0 % (ref 0–5)
EOSINOPHIL NFR BLD: 1 % (ref 0–5)
ERYTHROCYTE [DISTWIDTH] IN BLOOD BY AUTOMATED COUNT: 12.2 % (ref 11.6–14.5)
ERYTHROCYTE [DISTWIDTH] IN BLOOD BY AUTOMATED COUNT: 12.4 % (ref 11.6–14.5)
GLOBULIN SER CALC-MCNC: 3.9 G/DL (ref 2–4)
GLUCOSE SERPL-MCNC: 96 MG/DL (ref 74–99)
HCT VFR BLD AUTO: 37.3 % (ref 35–45)
HCT VFR BLD AUTO: 38.6 % (ref 35–45)
HGB BLD-MCNC: 11.9 G/DL (ref 12–16)
HGB BLD-MCNC: 12.8 G/DL (ref 12–16)
LYMPHOCYTES # BLD: 1.3 K/UL (ref 0.9–3.6)
LYMPHOCYTES # BLD: 2.4 K/UL (ref 0.9–3.6)
LYMPHOCYTES NFR BLD: 12 % (ref 21–52)
LYMPHOCYTES NFR BLD: 30 % (ref 21–52)
MCH RBC QN AUTO: 29.2 PG (ref 24–34)
MCH RBC QN AUTO: 29.4 PG (ref 24–34)
MCHC RBC AUTO-ENTMCNC: 31.9 G/DL (ref 31–37)
MCHC RBC AUTO-ENTMCNC: 33.2 G/DL (ref 31–37)
MCV RBC AUTO: 88.7 FL (ref 74–97)
MCV RBC AUTO: 91.4 FL (ref 74–97)
MONOCYTES # BLD: 0.4 K/UL (ref 0.05–1.2)
MONOCYTES # BLD: 0.5 K/UL (ref 0.05–1.2)
MONOCYTES NFR BLD: 4 % (ref 3–10)
MONOCYTES NFR BLD: 6 % (ref 3–10)
NEUTS SEG # BLD: 4.9 K/UL (ref 1.8–8)
NEUTS SEG # BLD: 9.2 K/UL (ref 1.8–8)
NEUTS SEG NFR BLD: 63 % (ref 40–73)
NEUTS SEG NFR BLD: 84 % (ref 40–73)
PLATELET # BLD AUTO: 276 K/UL (ref 135–420)
PLATELET # BLD AUTO: 327 K/UL (ref 135–420)
PMV BLD AUTO: 10.3 FL (ref 9.2–11.8)
PMV BLD AUTO: 10.3 FL (ref 9.2–11.8)
POTASSIUM SERPL-SCNC: 4.4 MMOL/L (ref 3.5–5.5)
PROT SERPL-MCNC: 6.8 G/DL (ref 6.4–8.2)
RBC # BLD AUTO: 4.08 M/UL (ref 4.2–5.3)
RBC # BLD AUTO: 4.35 M/UL (ref 4.2–5.3)
SODIUM SERPL-SCNC: 143 MMOL/L (ref 136–145)
WBC # BLD AUTO: 10.9 K/UL (ref 4.6–13.2)
WBC # BLD AUTO: 7.9 K/UL (ref 4.6–13.2)

## 2020-12-22 PROCEDURE — 80307 DRUG TEST PRSMV CHEM ANLYZR: CPT

## 2020-12-22 PROCEDURE — 85025 COMPLETE CBC W/AUTO DIFF WBC: CPT

## 2020-12-22 PROCEDURE — 74011250636 HC RX REV CODE- 250/636: Performed by: EMERGENCY MEDICINE

## 2020-12-22 PROCEDURE — 94640 AIRWAY INHALATION TREATMENT: CPT

## 2020-12-22 PROCEDURE — 96374 THER/PROPH/DIAG INJ IV PUSH: CPT

## 2020-12-22 PROCEDURE — 71045 X-RAY EXAM CHEST 1 VIEW: CPT

## 2020-12-22 PROCEDURE — 84484 ASSAY OF TROPONIN QUANT: CPT

## 2020-12-22 PROCEDURE — 99283 EMERGENCY DEPT VISIT LOW MDM: CPT

## 2020-12-22 PROCEDURE — 80053 COMPREHEN METABOLIC PANEL: CPT

## 2020-12-22 PROCEDURE — 99284 EMERGENCY DEPT VISIT MOD MDM: CPT

## 2020-12-22 PROCEDURE — 83880 ASSAY OF NATRIURETIC PEPTIDE: CPT

## 2020-12-22 PROCEDURE — 74011000250 HC RX REV CODE- 250: Performed by: EMERGENCY MEDICINE

## 2020-12-22 PROCEDURE — 93005 ELECTROCARDIOGRAM TRACING: CPT

## 2020-12-22 RX ORDER — IPRATROPIUM BROMIDE AND ALBUTEROL SULFATE 2.5; .5 MG/3ML; MG/3ML
3 SOLUTION RESPIRATORY (INHALATION)
Status: DISPENSED | OUTPATIENT
Start: 2020-12-22 | End: 2020-12-22

## 2020-12-22 RX ORDER — IPRATROPIUM BROMIDE AND ALBUTEROL SULFATE 2.5; .5 MG/3ML; MG/3ML
3 SOLUTION RESPIRATORY (INHALATION)
Status: COMPLETED | OUTPATIENT
Start: 2020-12-22 | End: 2020-12-23

## 2020-12-22 RX ORDER — LORAZEPAM 2 MG/ML
1 INJECTION INTRAMUSCULAR ONCE
Status: COMPLETED | OUTPATIENT
Start: 2020-12-22 | End: 2020-12-23

## 2020-12-22 RX ADMIN — METHYLPREDNISOLONE SODIUM SUCCINATE 125 MG: 125 INJECTION, POWDER, FOR SOLUTION INTRAMUSCULAR; INTRAVENOUS at 08:17

## 2020-12-22 RX ADMIN — IPRATROPIUM BROMIDE AND ALBUTEROL SULFATE 3 ML: .5; 3 SOLUTION RESPIRATORY (INHALATION) at 08:21

## 2020-12-22 RX ADMIN — IPRATROPIUM BROMIDE AND ALBUTEROL SULFATE 3 ML: .5; 3 SOLUTION RESPIRATORY (INHALATION) at 08:10

## 2020-12-22 NOTE — PROGRESS NOTES
Spoke with patient in ED, patient's new number is [phone], made patient aware that [employer] was trying to get in contact with her to deliver the oxygen, made patient aware that I will up date her phone number with [employer] so they can call her for the oxygen delivery. Patient stated that it is ok to update her new number in Epic.

## 2020-12-22 NOTE — ED PROVIDER NOTES
EMERGENCY DEPARTMENT HISTORY AND PHYSICAL EXAM    7:22 AM  Date: 12/22/2020  Patient Name: Margie Saldivar    History of Presenting Illness     Chief Complaint   Patient presents with    Medication Refill       History Provided By: patient    HPI: Margie Saldivar is a 63 y.o. female with past medical history of COPD, CHF on 3 4 L of home oxygen, cocaine abuse presents with difficulty breathing when she woke up this morning. Patient states that shortness of breath is constant, moderate in severity no associated symptoms or modifying factors. Patient states that always feels short of breath in the morning. Patient was evaluated for COPD exacerbation yesterday. Denies any cough, fever or chills. Patient was supposed to get home after ED discharge however patient was not able to obtain.     PCP: Arlyn Cano NP    Past History     Past Medical History:  Past Medical History:   Diagnosis Date    CHF (congestive heart failure) ([address]     Chronic respiratory failure with hypoxia ([address] 9/7/2020    CKD (chronic kidney disease) stage 3, GFR 30-59 ml/min 10/31/2019    Cocaine abuse ([address] 11/26/2017    COPD (chronic obstructive pulmonary disease) with chronic bronchitis ([address] 12/19/2017    Dependence on supplemental oxygen     Depression 3/22/2020    Drug-seeking behavior 11/19/2016    Endocrine disease     thyroid issues    Essential hypertension 3/10/2017    Fe deficiency anemia 10/27/2012    Fibromyalgia 12/16/2016    Gastroesophageal reflux disease without esophagitis 10/10/2016    Gastrointestinal disorder     \"blockage in my stomach\"    Hypercholesterolemia     Hypertension     Hypertensive heart failure ([address] 12/19/2017    Morbid obesity due to excess calories ([address] 3/10/2017    NSTEMI (non-ST elevated myocardial infarction) ([address] 3/22/2020    On home O2 12/3/2015    Overview:  2L at home per pt for approx 8 years    Polysubstance abuse ([address] 9/15/2018    Respiratory failure ([address] 1/11/2017  S/P hernia repair 10/31/2019    Sleep apnea 12/19/2017    T wave inversion in EKG 3/9/2019    Tobacco use 0/02/9343    Uncomplicated severe persistent asthma 12/13/2016    Vocal cord disease 12/7/2016       Past Surgical History:  Past Surgical History:   Procedure Laterality Date    HX GI      Exploratory laparotomy with lysis of adhesions and primary repair of incarcerated umbilical hernia       Family History:  Noncontributory    Social History:  Social History     Tobacco Use    Smoking status: Former Smoker     Packs/day: 0.25    Smokeless tobacco: Current User    Tobacco comment: Pt states she has not had money to buy cigarettes in the past month   Substance Use Topics    Alcohol use: No     Comment: socially    Drug use: Not Currently     Types: Heroin     Comment: pt reports using approximately a month ago       Allergies:  No Known Allergies    Review of Systems   Review of Systems   Constitutional: Negative for activity change, appetite change and chills. HENT: Negative for congestion, ear discharge, ear pain and sore throat. Eyes: Negative for photophobia and pain. Respiratory: Positive for shortness of breath. Negative for cough and choking. Cardiovascular: Negative for palpitations and leg swelling. Gastrointestinal: Negative for anal bleeding and rectal pain. Endocrine: Negative for polydipsia and polyuria. Genitourinary: Negative for genital sores and urgency. Musculoskeletal: Negative for arthralgias and myalgias. Neurological: Negative for dizziness, seizures and speech difficulty. Psychiatric/Behavioral: Negative for hallucinations, self-injury and suicidal ideas. Physical Exam     Patient Vitals for the past 12 hrs:   Temp Pulse Resp BP SpO2   12/22/20 0710 97.3 °F (36.3 °C) (!) 101 18 (!) 167/82 100 %       Physical Exam  Vitals signs and nursing note reviewed. Constitutional:       Appearance: She is well-developed.    HENT:      Head: Normocephalic and atraumatic. Eyes:      General:         Right eye: No discharge. Left eye: No discharge. Neck:      Musculoskeletal: Normal range of motion and neck supple. Cardiovascular:      Rate and Rhythm: Normal rate and regular rhythm. Heart sounds: Normal heart sounds. No murmur. Pulmonary:      Effort: Pulmonary effort is normal. No respiratory distress. Breath sounds: No stridor. Wheezing present. No rales. Chest:      Chest wall: No tenderness. Abdominal:      General: Bowel sounds are normal. There is no distension. Palpations: Abdomen is soft. Tenderness: There is no abdominal tenderness. There is no guarding or rebound. Musculoskeletal: Normal range of motion. Skin:     General: Skin is warm and dry. Neurological:      Mental Status: She is alert and oriented to person, place, and time. Diagnostic Study Results     Labs -  No results found for this or any previous visit (from the past 12 hour(s)). Radiologic Studies -   No results found. Medical Decision Making     ED Course: Progress Notes, Reevaluation, and Consults:    7:22 AM Initial assessment performed. The patients presenting problems have been discussed, and they/their family are in agreement with the care plan formulated and outlined with them. I have encouraged them to ask questions as they arise throughout their visit. =    Provider Notes (Medical Decision Making):  Presents with shortness of breath. Patient not hypoxic or tachypneic. Widespread wheeze. Patient will get duo nebs steroids. We will check her labs and perform a repeat x-ray and reassess  Labs as  interpreted by me no acute or abnormality x-ray chest no pneumonia  Patient eloped before reassessment.  assessed patient and states that patient will be able to obtain her oxygen today    Vital Signs-Reviewed the patient's vital signs. Reviewed pt's pulse ox reading.          Records Reviewed:  old medical records (Time of Review: 7:22 AM)  -I am the first provider for this patient.  -I reviewed the vital signs, available nursing notes, past medical history, past surgical history, family history and social history. Current Facility-Administered Medications   Medication Dose Route Frequency Provider Last Rate Last Admin    albuterol-ipratropium (DUO-NEB) 2.5 MG-0.5 MG/3 ML  3 mL Nebulization Q15MIN Kerry Hearn MD        methylPREDNISolone (PF) (Solu-MEDROL) injection 125 mg  125 mg IntraVENous NOW Kerry Hearn MD         Current Outpatient Medications   Medication Sig Dispense Refill    albuterol (PROVENTIL VENTOLIN) 2.5 mg /3 mL (0.083 %) nebu 3 mL by Nebulization route every six (6) hours as needed for Wheezing, Shortness of Breath or Respiratory Distress. 30 Nebule 0    predniSONE (DELTASONE) 50 mg tablet Take 1 Tab by mouth daily for 5 days. 5 Tab 0    albuterol (Proventil HFA) 90 mcg/actuation inhaler Take 2 Puffs by inhalation every four (4) hours as needed for Wheezing. 1 Inhaler 0    albuterol (PROVENTIL HFA, VENTOLIN HFA, PROAIR HFA) 90 mcg/actuation inhaler Take 2 Puffs by inhalation every four (4) hours as needed for Wheezing. 1 Inhaler 0    albuterol (ACCUNEB) 1.25 mg/3 mL nebu Take 3 mL by inhalation every four (4) hours as needed for Wheezing (wheezing). 25 Each 0    citalopram (CeleXA) 20 mg tablet Take 1 Tab by mouth every morning. 30 Tab 0    fluticasone furoate-vilanteroL (Breo Ellipta) 200-25 mcg/dose inhaler Take 1 Puff by inhalation daily. 1 Inhaler 0    roflumilast (DALIRESP) 500 mcg tab tablet Take 1 Tab by mouth daily. 30 Tab 0    montelukast (SINGULAIR) 10 mg tablet Take 1 Tab by mouth nightly. Indications: controller medication for asthma 30 Tab 1    cloNIDine HCL (CATAPRES) 0.2 mg tablet Take 0.2 mg by mouth three (3) times daily.  lisinopriL (PRINIVIL, ZESTRIL) 20 mg tablet Take 20 mg by mouth daily.  amLODIPine (NORVASC) 10 mg tablet Take 10 mg by mouth daily.       nitroglycerin (NITROSTAT) 0.4 mg SL tablet Take 1 Tab by mouth every five (5) minutes as needed for Chest Pain. Sit/Lay down then put one tab under the tongue every 5 minutes as needed for chest pain for 3 doses (Patient taking differently: Take 0.4 mg by mouth every five (5) minutes as needed for Chest Pain. Sit/Lay down then put one tab under the tongue every 5 minutes as needed for chest pain for 3 doses    Pt states she is out of this medication) 1 Bottle 0    aspirin delayed-release 81 mg tablet Take 1 Tab by mouth daily. 30 Tab 0    famotidine (PEPCID) 20 mg tablet Take 1 Tab by mouth daily. (Patient taking differently: Take 20 mg by mouth daily as needed.) 30 Tab 1    OXYGEN-AIR DELIVERY SYSTEMS 3 L by IntraNASal route as needed for Other (sob).  atorvastatin (LIPITOR) 20 mg tablet Take 1 Tab by mouth nightly. 30 Tab 0    naloxone (NARCAN) 4 mg/actuation nasal spray Use 1 spray intranasally, then discard. Repeat with new spray every 2 min as needed for opioid overdose symptoms, alternating nostrils. 1 Each 0        Clinical Impression     Clinical Impression: No diagnosis found. This note was dictated utilizing voice recognition software which may lead to typographical errors. I apologize in advance if the situation occurs. If questions arise please do not hesitate to contact me or call our department.     Ann Golden MD  7:22 AM

## 2020-12-22 NOTE — PROGRESS NOTES
Call made to [facility] [phone], spoke with Unique George stated that she called patient on both numbers, she could not get intouch with the patient so they did not deliver the oxygen.

## 2020-12-22 NOTE — PROGRESS NOTES
Patient has transitional care follow up with Pcp Dr. Harlan Salas, [facility] tomorrow 12/23/2020 at 9:00 am.

## 2020-12-22 NOTE — ED NOTES
Shift report given to [address] [place] [address] (oncoming nurse) by Hannah RN (off going nurse)

## 2020-12-22 NOTE — PROGRESS NOTES
Call made to [facility], spoke with Marvin Castillo, updated patient's new phone number [phone]. Marvin Castillo spoke with patient, oxygen set up to be deliverd this afternoon.

## 2020-12-22 NOTE — ED NOTES
Patient came in for shortness of breath. Patient has current oxygen saturation of 99% on room air. Patient is currently yelling and cursing at the nurse for not getting her coffee. When patient saw security she became more irate, walking out in the hallway screaming and yelling.

## 2020-12-22 NOTE — Clinical Note
Status[de-identified] INPATIENT [101]   Type of Bed: Medical [8]   Inpatient Hospitalization Certified Necessary for the Following Reasons: 3.  Patient receiving treatment that can only be provided in an inpatient setting (further clarification in H&P documentation)   Admitting Diagnosis: CHF (congestive heart failure) [facility]) [278001]   Admitting Physician: Eileen Shen [[id number]]   Attending Physician: Eileen Shen [[id number]]   Estimated Length of Stay: 2 Midnights   Discharge Plan[de-identified] Home with Office Follow-up

## 2020-12-23 ENCOUNTER — APPOINTMENT (OUTPATIENT)
Dept: NON INVASIVE DIAGNOSTICS | Age: 55
End: 2020-12-23
Attending: STUDENT IN AN ORGANIZED HEALTH CARE EDUCATION/TRAINING PROGRAM
Payer: MEDICAID

## 2020-12-23 PROBLEM — I50.9 CHF (CONGESTIVE HEART FAILURE) (HCC): Status: ACTIVE | Noted: 2020-12-23

## 2020-12-23 LAB
ALBUMIN SERPL-MCNC: 3.2 G/DL (ref 3.4–5)
ALBUMIN/GLOB SERPL: 0.8 {RATIO} (ref 0.8–1.7)
ALP SERPL-CCNC: 82 U/L (ref 45–117)
ALT SERPL-CCNC: 24 U/L (ref 13–56)
ANION GAP SERPL CALC-SCNC: 5 MMOL/L (ref 3–18)
AST SERPL-CCNC: 29 U/L (ref 10–38)
BILIRUB SERPL-MCNC: 0.3 MG/DL (ref 0.2–1)
BNP SERPL-MCNC: 4221 PG/ML (ref 0–900)
BUN SERPL-MCNC: 13 MG/DL (ref 7–18)
BUN/CREAT SERPL: 14 (ref 12–20)
CALCIUM SERPL-MCNC: 8.8 MG/DL (ref 8.5–10.1)
CHLORIDE SERPL-SCNC: 106 MMOL/L (ref 100–111)
CO2 SERPL-SCNC: 28 MMOL/L (ref 21–32)
CREAT SERPL-MCNC: 0.9 MG/DL (ref 0.6–1.3)
ETHANOL SERPL-MCNC: <3 MG/DL (ref 0–3)
GLOBULIN SER CALC-MCNC: 3.8 G/DL (ref 2–4)
GLUCOSE SERPL-MCNC: 106 MG/DL (ref 74–99)
POTASSIUM SERPL-SCNC: 5.4 MMOL/L (ref 3.5–5.5)
PROT SERPL-MCNC: 7 G/DL (ref 6.4–8.2)
SODIUM SERPL-SCNC: 139 MMOL/L (ref 136–145)
TROPONIN I SERPL-MCNC: <0.02 NG/ML (ref 0–0.04)

## 2020-12-23 PROCEDURE — 74011000250 HC RX REV CODE- 250: Performed by: STUDENT IN AN ORGANIZED HEALTH CARE EDUCATION/TRAINING PROGRAM

## 2020-12-23 PROCEDURE — 65270000029 HC RM PRIVATE

## 2020-12-23 PROCEDURE — 94762 N-INVAS EAR/PLS OXIMTRY CONT: CPT

## 2020-12-23 PROCEDURE — 99223 1ST HOSP IP/OBS HIGH 75: CPT | Performed by: INTERNAL MEDICINE

## 2020-12-23 PROCEDURE — 77010033678 HC OXYGEN DAILY

## 2020-12-23 PROCEDURE — 74011250636 HC RX REV CODE- 250/636: Performed by: STUDENT IN AN ORGANIZED HEALTH CARE EDUCATION/TRAINING PROGRAM

## 2020-12-23 PROCEDURE — 94761 N-INVAS EAR/PLS OXIMETRY MLT: CPT

## 2020-12-23 PROCEDURE — 77030038269 HC DRN EXT URIN PURWCK BARD -A

## 2020-12-23 PROCEDURE — 94640 AIRWAY INHALATION TREATMENT: CPT

## 2020-12-23 PROCEDURE — 99292 CRITICAL CARE ADDL 30 MIN: CPT | Performed by: INTERNAL MEDICINE

## 2020-12-23 PROCEDURE — 74011000250 HC RX REV CODE- 250: Performed by: EMERGENCY MEDICINE

## 2020-12-23 PROCEDURE — 77030027138 HC INCENT SPIROMETER -A

## 2020-12-23 PROCEDURE — 99218 HC RM OBSERVATION: CPT

## 2020-12-23 PROCEDURE — 96374 THER/PROPH/DIAG INJ IV PUSH: CPT

## 2020-12-23 PROCEDURE — 2709999900 HC NON-CHARGEABLE SUPPLY

## 2020-12-23 PROCEDURE — 74011250637 HC RX REV CODE- 250/637: Performed by: STUDENT IN AN ORGANIZED HEALTH CARE EDUCATION/TRAINING PROGRAM

## 2020-12-23 PROCEDURE — 74011250636 HC RX REV CODE- 250/636: Performed by: EMERGENCY MEDICINE

## 2020-12-23 PROCEDURE — 74011250637 HC RX REV CODE- 250/637: Performed by: EMERGENCY MEDICINE

## 2020-12-23 PROCEDURE — 74011250637 HC RX REV CODE- 250/637: Performed by: INTERNAL MEDICINE

## 2020-12-23 PROCEDURE — 99223 1ST HOSP IP/OBS HIGH 75: CPT | Performed by: STUDENT IN AN ORGANIZED HEALTH CARE EDUCATION/TRAINING PROGRAM

## 2020-12-23 PROCEDURE — 96375 TX/PRO/DX INJ NEW DRUG ADDON: CPT

## 2020-12-23 RX ORDER — BUDESONIDE 1 MG/2ML
1000 INHALANT ORAL
Status: DISCONTINUED | OUTPATIENT
Start: 2020-12-23 | End: 2020-12-25 | Stop reason: HOSPADM

## 2020-12-23 RX ORDER — AMLODIPINE BESYLATE 5 MG/1
5 TABLET ORAL DAILY
Status: DISCONTINUED | OUTPATIENT
Start: 2020-12-23 | End: 2020-12-25 | Stop reason: HOSPADM

## 2020-12-23 RX ORDER — ACETAMINOPHEN 650 MG/1
650 SUPPOSITORY RECTAL
Status: DISCONTINUED | OUTPATIENT
Start: 2020-12-23 | End: 2020-12-25 | Stop reason: HOSPADM

## 2020-12-23 RX ORDER — ONDANSETRON 4 MG/1
4 TABLET, ORALLY DISINTEGRATING ORAL
Status: DISCONTINUED | OUTPATIENT
Start: 2020-12-23 | End: 2020-12-25 | Stop reason: HOSPADM

## 2020-12-23 RX ORDER — ENOXAPARIN SODIUM 100 MG/ML
40 INJECTION SUBCUTANEOUS DAILY
Status: DISCONTINUED | OUTPATIENT
Start: 2020-12-23 | End: 2020-12-25 | Stop reason: HOSPADM

## 2020-12-23 RX ORDER — CITALOPRAM 20 MG/1
20 TABLET, FILM COATED ORAL
Status: DISCONTINUED | OUTPATIENT
Start: 2020-12-23 | End: 2020-12-25 | Stop reason: HOSPADM

## 2020-12-23 RX ORDER — GUAIFENESIN 100 MG/5ML
81 LIQUID (ML) ORAL DAILY
Status: DISCONTINUED | OUTPATIENT
Start: 2020-12-23 | End: 2020-12-25 | Stop reason: HOSPADM

## 2020-12-23 RX ORDER — ACETAMINOPHEN 500 MG
1000 TABLET ORAL
Status: ACTIVE | OUTPATIENT
Start: 2020-12-23 | End: 2020-12-23

## 2020-12-23 RX ORDER — ALPRAZOLAM 0.5 MG/1
0.5 TABLET ORAL
Status: DISCONTINUED | OUTPATIENT
Start: 2020-12-23 | End: 2020-12-25 | Stop reason: HOSPADM

## 2020-12-23 RX ORDER — GUAIFENESIN 100 MG/5ML
162 LIQUID (ML) ORAL
Status: COMPLETED | OUTPATIENT
Start: 2020-12-23 | End: 2020-12-23

## 2020-12-23 RX ORDER — ACETAMINOPHEN 325 MG/1
650 TABLET ORAL
Status: DISCONTINUED | OUTPATIENT
Start: 2020-12-23 | End: 2020-12-25 | Stop reason: HOSPADM

## 2020-12-23 RX ORDER — FUROSEMIDE 10 MG/ML
20 INJECTION INTRAMUSCULAR; INTRAVENOUS ONCE
Status: COMPLETED | OUTPATIENT
Start: 2020-12-23 | End: 2020-12-23

## 2020-12-23 RX ORDER — LISINOPRIL 20 MG/1
20 TABLET ORAL DAILY
Status: DISCONTINUED | OUTPATIENT
Start: 2020-12-23 | End: 2020-12-25 | Stop reason: HOSPADM

## 2020-12-23 RX ORDER — IPRATROPIUM BROMIDE 0.5 MG/2.5ML
0.5 SOLUTION RESPIRATORY (INHALATION)
Status: DISCONTINUED | OUTPATIENT
Start: 2020-12-23 | End: 2020-12-25 | Stop reason: HOSPADM

## 2020-12-23 RX ORDER — ALBUTEROL SULFATE 0.83 MG/ML
2.5 SOLUTION RESPIRATORY (INHALATION)
Status: DISCONTINUED | OUTPATIENT
Start: 2020-12-23 | End: 2020-12-25 | Stop reason: HOSPADM

## 2020-12-23 RX ORDER — SODIUM CHLORIDE 0.9 % (FLUSH) 0.9 %
5-40 SYRINGE (ML) INJECTION AS NEEDED
Status: DISCONTINUED | OUTPATIENT
Start: 2020-12-23 | End: 2020-12-25 | Stop reason: HOSPADM

## 2020-12-23 RX ORDER — ATORVASTATIN CALCIUM 20 MG/1
20 TABLET, FILM COATED ORAL
Status: DISCONTINUED | OUTPATIENT
Start: 2020-12-23 | End: 2020-12-25 | Stop reason: HOSPADM

## 2020-12-23 RX ORDER — POLYETHYLENE GLYCOL 3350 17 G/17G
17 POWDER, FOR SOLUTION ORAL DAILY PRN
Status: DISCONTINUED | OUTPATIENT
Start: 2020-12-23 | End: 2020-12-25 | Stop reason: HOSPADM

## 2020-12-23 RX ORDER — ONDANSETRON 2 MG/ML
4 INJECTION INTRAMUSCULAR; INTRAVENOUS
Status: DISCONTINUED | OUTPATIENT
Start: 2020-12-23 | End: 2020-12-25 | Stop reason: HOSPADM

## 2020-12-23 RX ORDER — SODIUM CHLORIDE 0.9 % (FLUSH) 0.9 %
5-40 SYRINGE (ML) INJECTION EVERY 8 HOURS
Status: DISCONTINUED | OUTPATIENT
Start: 2020-12-23 | End: 2020-12-25 | Stop reason: HOSPADM

## 2020-12-23 RX ORDER — QUETIAPINE FUMARATE 50 MG/1
50 TABLET, EXTENDED RELEASE ORAL DAILY
Status: DISCONTINUED | OUTPATIENT
Start: 2020-12-23 | End: 2020-12-25 | Stop reason: HOSPADM

## 2020-12-23 RX ADMIN — BUDESONIDE 1000 MCG: 1 SUSPENSION RESPIRATORY (INHALATION) at 07:44

## 2020-12-23 RX ADMIN — ALPRAZOLAM 0.5 MG: 0.5 TABLET ORAL at 12:38

## 2020-12-23 RX ADMIN — IPRATROPIUM BROMIDE AND ALBUTEROL SULFATE 3 ML: .5; 3 SOLUTION RESPIRATORY (INHALATION) at 00:27

## 2020-12-23 RX ADMIN — ASPIRIN 81 MG CHEWABLE TABLET 81 MG: 81 TABLET CHEWABLE at 09:07

## 2020-12-23 RX ADMIN — IPRATROPIUM BROMIDE 0.5 MG: 0.5 SOLUTION RESPIRATORY (INHALATION) at 19:55

## 2020-12-23 RX ADMIN — LORAZEPAM 1 MG: 2 INJECTION INTRAMUSCULAR; INTRAVENOUS at 00:27

## 2020-12-23 RX ADMIN — ALPRAZOLAM 0.5 MG: 0.5 TABLET ORAL at 20:30

## 2020-12-23 RX ADMIN — IPRATROPIUM BROMIDE 0.5 MG: 0.5 SOLUTION RESPIRATORY (INHALATION) at 07:45

## 2020-12-23 RX ADMIN — BUDESONIDE 1000 MCG: 1 SUSPENSION RESPIRATORY (INHALATION) at 19:55

## 2020-12-23 RX ADMIN — AMLODIPINE BESYLATE 5 MG: 5 TABLET ORAL at 09:07

## 2020-12-23 RX ADMIN — Medication 10 ML: at 05:57

## 2020-12-23 RX ADMIN — METHYLPREDNISOLONE SODIUM SUCCINATE 125 MG: 125 INJECTION, POWDER, FOR SOLUTION INTRAMUSCULAR; INTRAVENOUS at 00:27

## 2020-12-23 RX ADMIN — LISINOPRIL 20 MG: 20 TABLET ORAL at 09:07

## 2020-12-23 RX ADMIN — Medication 10 ML: at 21:34

## 2020-12-23 RX ADMIN — NITROGLYCERIN 1 INCH: 20 OINTMENT TOPICAL at 00:27

## 2020-12-23 RX ADMIN — QUETIAPINE FUMARATE 50 MG: 50 TABLET, EXTENDED RELEASE ORAL at 16:56

## 2020-12-23 RX ADMIN — ATORVASTATIN CALCIUM 20 MG: 20 TABLET, FILM COATED ORAL at 21:32

## 2020-12-23 RX ADMIN — FUROSEMIDE 20 MG: 10 INJECTION, SOLUTION INTRAMUSCULAR; INTRAVENOUS at 05:57

## 2020-12-23 RX ADMIN — ASPIRIN 81 MG CHEWABLE TABLET 162 MG: 81 TABLET CHEWABLE at 00:27

## 2020-12-23 RX ADMIN — CITALOPRAM HYDROBROMIDE 20 MG: 20 TABLET ORAL at 09:07

## 2020-12-23 NOTE — H&P
History and Physical    Patient: Tyrone Jane               Sex: female          DOA: 12/22/2020       YOB: 1965      Age:  63 y.o.        LOS:  LOS: 0 days        HPI:     Tyrone Jane is a 63 y.o. female with chronic respiratory failure, COPD, heart failure, and hypertension who presented to [facility] ER early morning of 23 December 2020 with worsening dyspnea. Patient has been in and out of the ER multiple times with similar complaints. She was admitted on 14 December 2020 with shortness of breath and found to be in a COPD exacerbation. She was admitted in 22 March 2020 for new diagnosis of heart failure. She was in fluid overload and hypertensive emergency at that time, however left AMA after being taken to inpatient room. Patient presented to the ER yesterday and what was presumed to be a COPD exacerbation. She was treated with DuoNeb and Solu-Medrol. She then left AMA citing concerns about her home oxygen. Patient represented to the ER in the late night and was again treated with DuoNeb and Solu-Medrol. Her BNP was found to be elevated and Nitro-Bid was applied to her chest.  Hospitalist was paged for admission for heart failure exacerbation. Patient tells me that she has been short of breath for over 10 years and that she still does not know why. She has been having difficulty getting home oxygen to her place of living. Patient denies fevers/chills, new onset cough, arthralgias, and diarrhea. Patient denies chest pain, palpitations, abdominal pain, and new onset weakness. Patient endorses using cocaine in the past.  She admits to smoking cigarettes daily, which she buys individually from people on the street.     ER Course:   Solu-Medrol  Nitro-Bid 2% ointment 1 inch  Aspirin  DuoNeb    Past Medical History:   Diagnosis Date    CHF (congestive heart failure) (HCC)     Chronic respiratory failure with hypoxia ([facility] 9/7/2020    CKD (chronic kidney disease) stage 3, GFR 30-59 ml/min 10/31/2019    Cocaine abuse ([address] 11/26/2017    COPD (chronic obstructive pulmonary disease) with chronic bronchitis ([address] 12/19/2017    Dependence on supplemental oxygen     Depression 3/22/2020    Drug-seeking behavior 11/19/2016    Endocrine disease     thyroid issues    Essential hypertension 3/10/2017    Fe deficiency anemia 10/27/2012    Fibromyalgia 12/16/2016    Gastroesophageal reflux disease without esophagitis 10/10/2016    Gastrointestinal disorder     \"blockage in my stomach\"    Hypercholesterolemia     Hypertension     Hypertensive heart failure ([address] 12/19/2017    Morbid obesity due to excess calories ([address] 3/10/2017    NSTEMI (non-ST elevated myocardial infarction) ([address] 3/22/2020    On home O2 12/3/2015    Overview:  2L at home per pt for approx 8 years    Polysubstance abuse ([address] 9/15/2018    Respiratory failure ([address] 1/11/2017    S/P hernia repair 10/31/2019    Sleep apnea 12/19/2017    T wave inversion in EKG 3/9/2019    Tobacco use 4/93/5703    Uncomplicated severe persistent asthma 12/13/2016    Vocal cord disease 12/7/2016     Past Surgical History:   Procedure Laterality Date    HX GI      Exploratory laparotomy with lysis of adhesions and primary repair of incarcerated umbilical hernia      No family history on file. Social History     Tobacco Use    Smoking status: Former Smoker     Packs/day: 0.25    Smokeless tobacco: Current User    Tobacco comment: Pt states she has not had money to buy cigarettes in the past month   Substance Use Topics    Alcohol use: No     Comment: socially      Prior to Admission medications    Medication Sig Start Date End Date Taking? Authorizing Provider   albuterol (PROVENTIL VENTOLIN) 2.5 mg /3 mL (0.083 %) nebu 3 mL by Nebulization route every six (6) hours as needed for Wheezing, Shortness of Breath or Respiratory Distress.  12/21/20   Edwige Kwong MD   predniSONE (DELTASONE) 50 mg tablet Take 1 Tab by mouth daily for 5 days. 12/20/20 12/25/20  Kirby Goddard DO   albuterol (Proventil HFA) 90 mcg/actuation inhaler Take 2 Puffs by inhalation every four (4) hours as needed for Wheezing. 12/18/20   Pina Cedillo MD   albuterol (PROVENTIL HFA, VENTOLIN HFA, PROAIR HFA) 90 mcg/actuation inhaler Take 2 Puffs by inhalation every four (4) hours as needed for Wheezing. 12/18/20   Pina Cedillo MD   albuterol (ACCUNEB) 1.25 mg/3 mL nebu Take 3 mL by inhalation every four (4) hours as needed for Wheezing (wheezing). 12/18/20   Pina Cedillo MD   citalopram (CeleXA) 20 mg tablet Take 1 Tab by mouth every morning. 11/30/20   Osmin Augustine MD   fluticasone furoate-vilanteroL (Breo Ellipta) 200-25 mcg/dose inhaler Take 1 Puff by inhalation daily. 11/30/20   Osmin Augustine MD   roflumilast (DALIRESP) 500 mcg tab tablet Take 1 Tab by mouth daily. 11/30/20   Osmin Augustine MD   montelukast (SINGULAIR) 10 mg tablet Take 1 Tab by mouth nightly. Indications: controller medication for asthma 11/30/20   Osmin Augustine MD   cloNIDine HCL (CATAPRES) 0.2 mg tablet Take 0.2 mg by mouth three (3) times daily. 10/1/20   Provider, Historical   lisinopriL (PRINIVIL, ZESTRIL) 20 mg tablet Take 20 mg by mouth daily. 10/1/20   Provider, Historical   amLODIPine (NORVASC) 10 mg tablet Take 10 mg by mouth daily. 10/1/20   Provider, Historical   nitroglycerin (NITROSTAT) 0.4 mg SL tablet Take 1 Tab by mouth every five (5) minutes as needed for Chest Pain. Sit/Lay down then put one tab under the tongue every 5 minutes as needed for chest pain for 3 doses  Patient taking differently: Take 0.4 mg by mouth every five (5) minutes as needed for Chest Pain. Sit/Lay down then put one tab under the tongue every 5 minutes as needed for chest pain for 3 doses    Pt states she is out of this medication 10/19/20   Jose Aguero MD   aspirin delayed-release 81 mg tablet Take 1 Tab by mouth daily.  3/27/20   Deangelo Orta MD   famotidine (PEPCID) 20 mg tablet Take 1 Tab by mouth daily. Patient taking differently: Take 20 mg by mouth daily as needed. 3/26/20   Nancy Clayton MD   OXYGEN-AIR DELIVERY SYSTEMS 3 L by IntraNASal route as needed for Other (sob). Provider, Historical   atorvastatin (LIPITOR) 20 mg tablet Take 1 Tab by mouth nightly. 3/14/19   Melissa Cueto MD   naloxone [facility]) 4 mg/actuation nasal spray Use 1 spray intranasally, then discard. Repeat with new spray every 2 min as needed for opioid overdose symptoms, alternating nostrils. 3/14/19   Melissa Cueto MD        No Known Allergies    Review of Systems:    Negative Unless BOLDED    Constitutional: Fever, chills,diaphoresis. HENT: Negative for congestion, rhinorrhea, sore throat and trouble swallowing. Eyes: Negative for visual disturbance. Respiratory: Cough,shortness of breath, wheezing. Cardiovascular: Chest pain, palpitations   Gastrointestinal: Abdominal pain, blood in stool, constipation, diarrhea, nausea and vomiting. Endocrine: Polyuria. Genitourinary: Difficulty urinating and dysuria. Musculoskeletal: Arthralgias and neck stiffness. Skin: Pallor, rash. Neurological: Dizziness, weakness, numbness and headaches. Hematological: Bruise/bleed easily   Psychiatric/Behavioral: Confusion, dysphoric mood, hallucinations  All other systems reviewed and are negative. Physical Exam:      Vitals:    20 2216   BP: (!) 172/85   Pulse: 99   Temp: 98 °F (36.7 °C)   SpO2: 99%      Temp (24hrs), Av.7 °F (36.5 °C), Min:97.3 °F (36.3 °C), Max:98 °F (36.7 °C)      General:   awake alert and oriented   Skin:   Various tattoos noted   HEENT:  Normocephalic, atraumatic, PERRL, EOMI, no scleral icterus or pallor; no conjunctival hemmohage;  nasal and oral mucous are moist and without evidence of lesions.         Lungs:   Diffuse mild to moderate expiratory wheezing, no crackles   Heart:  RRR, s1 and s2; no murmurs rubs or gallops, nonpitting edema present bilaterally lower extremities, elevated JVD   Abdomen:  soft, non-distended, active bowel sounds, Non-tender   Genitourinary:  deferred   Extremities:   Moving all 4 extremities, muscle mass appears appropriate for age   Neurologic:  No gross focal sensory abnormalities; grossly 5/5 muscle strength to upper and lower extremities. Speech appropirate. Cranial nerves grossly intact   Psychiatric:   appropriate and interactive. Labs Reviewed:    Recent Results (from the past 24 hour(s))   CBC WITH AUTOMATED DIFF    Collection Time: 12/22/20  8:00 AM   Result Value Ref Range    WBC 7.9 4.6 - 13.2 K/uL    RBC 4.08 (L) 4.20 - 5.30 M/uL    HGB 11.9 (L) 12.0 - 16.0 g/dL    HCT 37.3 35.0 - 45.0 %    MCV 91.4 74.0 - 97.0 FL    MCH 29.2 24.0 - 34.0 PG    MCHC 31.9 31.0 - 37.0 g/dL    RDW 12.4 11.6 - 14.5 %    PLATELET 655 037 - 119 K/uL    MPV 10.3 9.2 - 11.8 FL    NEUTROPHILS 63 40 - 73 %    LYMPHOCYTES 30 21 - 52 %    MONOCYTES 6 3 - 10 %    EOSINOPHILS 1 0 - 5 %    BASOPHILS 0 0 - 2 %    ABS. NEUTROPHILS 4.9 1.8 - 8.0 K/UL    ABS. LYMPHOCYTES 2.4 0.9 - 3.6 K/UL    ABS. MONOCYTES 0.5 0.05 - 1.2 K/UL    ABS. EOSINOPHILS 0.1 0.0 - 0.4 K/UL    ABS. BASOPHILS 0.0 0.0 - 0.1 K/UL    DF AUTOMATED     METABOLIC PANEL, COMPREHENSIVE    Collection Time: 12/22/20  8:00 AM   Result Value Ref Range    Sodium 143 136 - 145 mmol/L    Potassium 4.4 3.5 - 5.5 mmol/L    Chloride 110 100 - 111 mmol/L    CO2 33 (H) 21 - 32 mmol/L    Anion gap 0 (L) 3.0 - 18 mmol/L    Glucose 96 74 - 99 mg/dL    BUN 19 (H) 7.0 - 18 MG/DL    Creatinine 1.17 0.6 - 1.3 MG/DL    BUN/Creatinine ratio 16 12 - 20      GFR est AA 58 (L) >60 ml/min/1.73m2    GFR est non-AA 48 (L) >60 ml/min/1.73m2    Calcium 8.8 8.5 - 10.1 MG/DL    Bilirubin, total 0.2 0.2 - 1.0 MG/DL    ALT (SGPT) 23 13 - 56 U/L    AST (SGOT) 11 10 - 38 U/L    Alk.  phosphatase 77 45 - 117 U/L    Protein, total 6.8 6.4 - 8.2 g/dL    Albumin 2.9 (L) 3.4 - 5.0 g/dL    Globulin 3.9 2.0 - 4.0 g/dL    A-G Ratio 0.7 (L) 0.8 - 1.7     EKG, 12 LEAD, INITIAL    Collection Time: 12/22/20 10:22 PM   Result Value Ref Range    Ventricular Rate 74 BPM    Atrial Rate 74 BPM    P-R Interval 124 ms    QRS Duration 88 ms    Q-T Interval 416 ms    QTC Calculation (Bezet) 461 ms    Calculated P Axis 70 degrees    Calculated R Axis 57 degrees    Calculated T Axis 69 degrees    Diagnosis       Normal sinus rhythm  Minimal voltage criteria for LVH, may be normal variant  ST elevation, consider early repolarization, pericarditis, or injury  Abnormal ECG  When compared with ECG of 20-DEC-2020 07:35,  Nonspecific T wave abnormality no longer evident in Inferior leads  T wave inversion no longer evident in Lateral leads  QT has lengthened     CBC WITH AUTOMATED DIFF    Collection Time: 12/22/20 11:21 PM   Result Value Ref Range    WBC 10.9 4.6 - 13.2 K/uL    RBC 4.35 4.20 - 5.30 M/uL    HGB 12.8 12.0 - 16.0 g/dL    HCT 38.6 35.0 - 45.0 %    MCV 88.7 74.0 - 97.0 FL    MCH 29.4 24.0 - 34.0 PG    MCHC 33.2 31.0 - 37.0 g/dL    RDW 12.2 11.6 - 14.5 %    PLATELET 492 790 - 019 K/uL    MPV 10.3 9.2 - 11.8 FL    NEUTROPHILS 84 (H) 40 - 73 %    LYMPHOCYTES 12 (L) 21 - 52 %    MONOCYTES 4 3 - 10 %    EOSINOPHILS 0 0 - 5 %    BASOPHILS 0 0 - 2 %    ABS. NEUTROPHILS 9.2 (H) 1.8 - 8.0 K/UL    ABS. LYMPHOCYTES 1.3 0.9 - 3.6 K/UL    ABS. MONOCYTES 0.4 0.05 - 1.2 K/UL    ABS. EOSINOPHILS 0.0 0.0 - 0.4 K/UL    ABS.  BASOPHILS 0.0 0.0 - 0.1 K/UL    DF AUTOMATED     METABOLIC PANEL, COMPREHENSIVE    Collection Time: 12/22/20 11:21 PM   Result Value Ref Range    Sodium 139 136 - 145 mmol/L    Potassium 5.4 3.5 - 5.5 mmol/L    Chloride 106 100 - 111 mmol/L    CO2 28 21 - 32 mmol/L    Anion gap 5 3.0 - 18 mmol/L    Glucose 106 (H) 74 - 99 mg/dL    BUN 13 7.0 - 18 MG/DL    Creatinine 0.90 0.6 - 1.3 MG/DL    BUN/Creatinine ratio 14 12 - 20      GFR est AA >60 >60 ml/min/1.73m2    GFR est non-AA >60 >60 ml/min/1.73m2    Calcium 8.8 8.5 - 10.1 MG/DL Bilirubin, total 0.3 0.2 - 1.0 MG/DL    ALT (SGPT) 24 13 - 56 U/L    AST (SGOT) 29 10 - 38 U/L    Alk. phosphatase 82 45 - 117 U/L    Protein, total 7.0 6.4 - 8.2 g/dL    Albumin 3.2 (L) 3.4 - 5.0 g/dL    Globulin 3.8 2.0 - 4.0 g/dL    A-G Ratio 0.8 0.8 - 1.7     NT-PRO BNP    Collection Time: 12/22/20 11:21 PM   Result Value Ref Range    NT pro-BNP 4,221 (H) 0 - 900 PG/ML   TROPONIN I    Collection Time: 12/22/20 11:21 PM   Result Value Ref Range    Troponin-I, QT <0.02 0.0 - 0.045 NG/ML   ETHYL ALCOHOL    Collection Time: 12/22/20 11:21 PM   Result Value Ref Range    ALCOHOL(ETHYL),SERUM <3 0 - 3 MG/DL        Imaging:  CT Results  (Last 48 hours)    None           CXR Results  (Last 48 hours)               12/22/20 0753  XR CHEST PORT Final result    Impression:  IMPRESSION:       No acute pulmonic disease. Borderline heart size. Narrative:  EXAM: CHEST  CPT CODE: 19117       CLINICAL INDICATION/HISTORY: Shortness of breath. COMPARISON: 20 December 2020. TECHNIQUE: Single AP portable view of chest at 0731. FINDINGS: There are clear lungs and sharp pleural margins. Mediastinum is not   widened but the heart is at the upper limits of normal in size. The bones and   soft tissues are unremarkable. There is no significant interval change. Assessment/Plan     59-year-old woman with hypertension, COPD, congestive heart failure, and chronic hypoxic respiratory failure reportedly on 2 L of oxygen at home is now admitted with a heart failure exacerbation. Pulmonary function test from 2011 shows very severe obstructive pattern that improved with bronchodilator therapy. Is patient COPD really asthma? Chest x-ray is clear. I am concerned that her pulmonary disease process has given way to right sided heart failure; likely pulmonary hypertension group 3. Unfortunately, patient appears to often leave AMA before a more extensive work-up can be performed.   I am placing patient on both ipratropium and Flovent with as needed albuterol. PFT and echocardiogram has been ordered. Congestive heart failure  COPD  Chronic hypoxic respiratory failure  Hypertension    PLAN:    Please consult cardiology  Lasix 20 mg IV x1 -reassess for need of further diuresis  PFT  Flovent twice daily  Ipratropium 3 times daily  Echocardiogram  Continuing her home medications of amlodipine, aspirin, atorvastatin, citalopram, lisinopril    Activity: As tolerated with assistance  Diet: Regular  Antibiotics: None  DVT prophylaxis: Lovenox  CODE status: Full    Disposition: Admit to medical [facility] for further evaluation and treatment    Signed By: Tucker Munoz MD   [facility]    December 23, 2020      Dragon voice recognition software was used for parts of this note. Unintended errors may have occurred.

## 2020-12-23 NOTE — PROGRESS NOTES
Pt on the phone with her . She gave me the number of [facility] to verify her methadone dose. The personnel there said she is no longer an active patient as she has not followed up with them since last hospital admission December 11. Explained this to pt, who was very anger, swearing on the phone talking to her . She also says she needs her xanax and percocet. But did not see this listed on her PTA med record  And will need to talk with Dr. Javon Beck this am.       for pt is Ms. Donnell Adkins [phone].    1100 Pt refused to go to [facility] saying her doctor said she wanted to check something first.

## 2020-12-23 NOTE — PROGRESS NOTES
conducted an initial consultation and Spiritual Assessment for Kelsie Tanner, who is a 63 y.o.,female. Patients Primary Language is: [place]. According to the patients EMR Orthodox Affiliation is: [employer].      The reason the Patient came to the hospital is:   Patient Active Problem List    Diagnosis Date Noted    CHF (congestive heart failure) ([address] 12/23/2020    Acute bronchitis with chronic obstructive pulmonary disease (COPD) ([address] 11/28/2020    Asthma exacerbation 11/12/2020    COPD (chronic obstructive pulmonary disease) ([address] 11/12/2020    UTI (urinary tract infection) 10/08/2020    Atypical chest pain 10/08/2020    DEMARCUS (acute kidney injury) ([address] 10/08/2020    Acute exacerbation of chronic obstructive pulmonary disease (COPD) ([address] 09/14/2020    COPD with acute exacerbation ([address] 09/07/2020    Chronic respiratory failure with hypoxia ([address] 09/07/2020    Acute bronchitis 09/07/2020    Suspected COVID-19 virus infection 09/07/2020    Tachycardia 09/07/2020    Normocytic anemia 09/07/2020    Acute respiratory distress 09/07/2020    COPD exacerbation ([address] 09/07/2020    Depression 03/22/2020    S/P hernia repair 10/31/2019    CKD (chronic kidney disease) stage 3, GFR 30-59 ml/min 10/31/2019    Tobacco use 09/28/2019    T wave inversion in EKG 03/09/2019    HLD (hyperlipidemia) 09/15/2018    Polysubstance abuse ([address] 09/15/2018    Hypertensive heart failure ([address] 12/19/2017    Sleep apnea 12/19/2017    COPD (chronic obstructive pulmonary disease) with chronic bronchitis ([address] 12/19/2017    Cocaine abuse ([address] 11/26/2017    Essential hypertension 03/10/2017    Morbid obesity due to excess calories ([address] 03/10/2017    Acute exacerbation of COPD with asthma ([address] 03/02/2017    Respiratory failure ([address] 01/11/2017    Fibromyalgia 87/49/6577    Uncomplicated severe persistent asthma 12/13/2016    Vocal cord disease 12/07/2016    Drug-seeking behavior 11/19/2016    Gastroesophageal reflux disease without esophagitis 10/10/2016    Thyroid goiter 06/30/2016    Asthma 03/05/2016    On home O2 12/03/2015    Abnormal CT of the chest 11/20/2015    MDRO (multiple drug resistant organisms) resistance 11/08/2012    Fe deficiency anemia 10/27/2012        The  provided the following Interventions:  Initiated a relationship of care and support. Explored issues of marilee, belief, spirituality and Worship/ritual needs while hospitalized. Listened empathically. Provided chaplaincy education. Provided information about Spiritual Care Services. Offered prayer and assurance of continued prayers on patient's behalf. Chart reviewed. The following outcomes where achieved:  Patient shared limited information about both her medical narrative and spiritual journey/beliefs.  confirmed Patient [employer]oration[employer] Affiliation. Patient processed feeling about current hospitalization. Patient expressed gratitude for 's visit. Assessment:  Patient does not have any Worship/cultural needs that will affect patients preferences in health care. There are no spiritual or Worship issues which require intervention at this time. Plan:  Chaplains will continue to follow and will provide pastoral care on an as needed/requested basis.  recommends bedside caregivers page  on duty if patient shows signs of acute spiritual or emotional distress.     [address] [facility]   [phone]

## 2020-12-23 NOTE — CONSULTS
[facility] - Consult Note    Consultation request by Sudeep Wilson MD for advice/opinion related to evaluating CHF (congestive heart failure) ([facility]) [I50.9]    Date of  Admission: 12/22/2020 10:12 PM   Primary Care Physician:  Chalo Kearney MD     Assessment:     Patient Active Problem List   Diagnosis Code   • Abnormal CT of the chest R93.89   • Asthma J45.909   • Respiratory failure ([facility]) J96.90   • Acute exacerbation of COPD with asthma ([facility]) J44.1, J45.901   • Essential hypertension I10   • Morbid obesity due to excess calories ([facility]) E66.01   • Hypertensive heart failure ([facility]) I11.0   • Sleep apnea G47.30   • COPD (chronic obstructive pulmonary disease) with chronic bronchitis ([facility]) J44.9   • T wave inversion in EKG R94.31   • Tobacco use Z72.0   • S/P hernia repair Z98.890, Z87.19   • CKD (chronic kidney disease) stage 3, GFR 30-59 ml/min N18.30   • Cocaine abuse ([facility]) F14.10   • Drug-seeking behavior Z76.5   • Fibromyalgia M79.7   • Depression F32.9   • Fe deficiency anemia D50.9   • Gastroesophageal reflux disease without esophagitis K21.9   • HLD (hyperlipidemia) E78.5   • Thyroid goiter E04.9   • MDRO (multiple drug resistant organisms) resistance [id number]   • On home O2 Z99.81   • Polysubstance abuse ([facility]) F19.10   • Uncomplicated severe persistent asthma J45.50   • Vocal cord disease J38.3   • COPD with acute exacerbation ([facility]) J44.1   • Chronic respiratory failure with hypoxia ([facility]) J96.11   • Acute bronchitis J20.9   • Suspected COVID-19 virus infection Z20.828   • Tachycardia R00.0   • Normocytic anemia D64.9   • Acute respiratory distress R06.03   • COPD exacerbation ([facility]) J44.1   • Acute exacerbation of chronic obstructive pulmonary disease (COPD) ([facility]) J44.1   • UTI (urinary tract infection) N39.0   • Atypical chest pain R07.89   • DEMARCUS (acute kidney injury) ([facility]) N17.9   • Asthma exacerbation J45.901   • COPD (chronic obstructive pulmonary disease) ([facility]) J44.9   • Acute  bronchitis with chronic obstructive pulmonary disease (COPD) ([facility]) J44.0, J20.9    CHF (congestive heart failure) ([facility]) I50.9     -Acute shortness of breath. Unclear etiology at this point. Her lung sounds are more consistent with a pulmonary etiology with diffuse wheezing. Historically her LV function has been normal.    -Poorly controlled hypertension. Would aggressively treat.  -Tobacco use until 1 month ago. She likely has COPD. She has early morning shortness of breath and sputum production as well as cough. -Transient left bundle branch block. This has been present in the past.  -Acute partial small bowel obstruction- NPO  -HTN out of control and on NTG drip and starting hydralazine.  -Dyslipidemia  -History of prolonged heroin abuse. Patient states she was a daily user up until last year since starting on a methadone program.       -No primary cardiologist    She had coronary angiography performed in March 2020 which showed normal coronary anatomy without obstructive disease. The results are as follows:  · Normal right dominant epicardial coronary circulation without significant stenosis. · Normal LV function with EF 65%. · No further coronary evaluation needed. She had an echocardiogram done in October 2020 which showed overall normal LV function. The results are as follows:  · LV: Estimated LVEF is 60 - 65%. Visually measured ejection fraction. Normal cavity size and systolic function (ejection fraction normal). Moderate concentric hypertrophy. Wall motion: normal.  · PA: Pulmonary arterial systolic pressure is 25 mmHg. Plan:          History of Present Illness: This is a 63 y.o. female admitted for CHF (congestive heart failure) ([facility] [I50.9]. Patient complains of: Continued shortness of breath. Her major issue is almost daily shortness of breath when she awakens with productive cough. She also has poorly controlled hypertension.   She has had numerous presentations to the emergency room department for COPD exacerbation and shortness of breath with wheezing. Cardiac risk factors: smoking/ tobacco exposure, hypertension      Review of Symptoms:  Except as stated above include:  Constitutional:  negative  Respiratory:  negative  Cardiovascular:  negative  Gastrointestinal: negative  Genitourinary:  negative  Musculoskeletal:  Negative  Neurological:  Negative  Dermatological:  Negative  Endocrinological: Negative  Psychological:  Negative    A comprehensive review of systems was negative except for that written in the HPI.      Past Medical History:     Past Medical History:   Diagnosis Date    CHF (congestive heart failure) ([address]     Chronic respiratory failure with hypoxia ([address] 9/7/2020    CKD (chronic kidney disease) stage 3, GFR 30-59 ml/min 10/31/2019    Cocaine abuse ([address] 11/26/2017    COPD (chronic obstructive pulmonary disease) with chronic bronchitis ([address] 12/19/2017    Dependence on supplemental oxygen     Depression 3/22/2020    Drug-seeking behavior 11/19/2016    Endocrine disease     thyroid issues    Essential hypertension 3/10/2017    Fe deficiency anemia 10/27/2012    Fibromyalgia 12/16/2016    Gastroesophageal reflux disease without esophagitis 10/10/2016    Gastrointestinal disorder     \"blockage in my stomach\"    Hypercholesterolemia     Hypertension     Hypertensive heart failure ([address] 12/19/2017    Morbid obesity due to excess calories ([address] 3/10/2017    NSTEMI (non-ST elevated myocardial infarction) ([address] 3/22/2020    On home O2 12/3/2015    Overview:  2L at home per pt for approx 8 years    Polysubstance abuse ([address] 9/15/2018    Respiratory failure ([address] 1/11/2017    S/P hernia repair 10/31/2019    Sleep apnea 12/19/2017    T wave inversion in EKG 3/9/2019    Tobacco use 4/38/9340    Uncomplicated severe persistent asthma 12/13/2016    Vocal cord disease 12/7/2016         Social History:     Social History     Socioeconomic History    Marital status: SINGLE     Spouse name: Not on file    Number of children: Not on file    Years of education: Not on file    Highest education level: Not on file   Tobacco Use    Smoking status: Former Smoker     Packs/day: 0.25    Smokeless tobacco: Current User    Tobacco comment: Pt states she has not had money to buy cigarettes in the past month   Substance and Sexual Activity    Alcohol use: No     Comment: socially    Drug use: Not Currently     Types: Heroin     Comment: pt reports using approximately a month ago    Sexual activity: Not Currently     Comment: last used 9 years ago        Family History:   No family history on file.      Medications:   No Known Allergies     Current Facility-Administered Medications   Medication Dose Route Frequency    acetaminophen (TYLENOL) tablet 1,000 mg  1,000 mg Oral NOW    sodium chloride (NS) flush 5-40 mL  5-40 mL IntraVENous Q8H    sodium chloride (NS) flush 5-40 mL  5-40 mL IntraVENous PRN    acetaminophen (TYLENOL) tablet 650 mg  650 mg Oral Q6H PRN    Or    acetaminophen (TYLENOL) suppository 650 mg  650 mg Rectal Q6H PRN    polyethylene glycol (MIRALAX) packet 17 g  17 g Oral DAILY PRN    enoxaparin (LOVENOX) injection 40 mg  40 mg SubCUTAneous DAILY    ondansetron (ZOFRAN ODT) tablet 4 mg  4 mg Oral Q8H PRN    Or    ondansetron (ZOFRAN) injection 4 mg  4 mg IntraVENous Q6H PRN    citalopram (CELEXA) tablet 20 mg  20 mg Oral 7am    lisinopriL (PRINIVIL, ZESTRIL) tablet 20 mg  20 mg Oral DAILY    atorvastatin (LIPITOR) tablet 20 mg  20 mg Oral QHS    aspirin chewable tablet 81 mg  81 mg Oral DAILY    amLODIPine (NORVASC) tablet 5 mg  5 mg Oral DAILY    albuterol (PROVENTIL VENTOLIN) nebulizer solution 2.5 mg  2.5 mg Nebulization Q4H PRN    ipratropium (ATROVENT) 0.02 % nebulizer solution 0.5 mg  0.5 mg Nebulization TID RT    budesonide (PULMICORT) 1,000 mcg/2 mL nebulizer susp  1,000 mcg Nebulization BID RT         Physical Exam:     Visit Vitals  /73 (BP 1 Location: Left arm, BP Patient Position: At rest)   Pulse 71   Temp 97.7 °F (36.5 °C)   Resp 18   Ht 4' 11\" (1.499 m)   Wt 74.8 kg (164 lb 12.8 oz)   SpO2 94%   BMI 33.29 kg/m²     BP Readings from Last 3 Encounters:   12/23/20 130/73   12/22/20 (!) 167/82   12/21/20 (!) 151/58     Pulse Readings from Last 3 Encounters:   12/23/20 71   12/22/20 (!) 101   12/21/20 87     Wt Readings from Last 3 Encounters:   12/23/20 74.8 kg (164 lb 12.8 oz)   12/17/20 77.1 kg (170 lb)   12/16/20 76.9 kg (169 lb 7.5 oz)       General:  alert, cooperative, no distress, appears stated age  Neck:  no JVD  Lungs:  wheezes posteriorly  Heart:  regular rate and rhythm  Abdomen:  no guarding or rigidity  Extremities:  no edema  Skin: Warm and dry.  no hyperpigmentation, vitiligo, or suspicious lesions  Neuro: alert, oriented x3, affect appropriate, no focal neurological deficits, moves all extremities well, no involuntary movements  Psych: non focal     Data Review:     Recent Labs     12/22/20 2321 12/22/20  0800   WBC 10.9 7.9   HGB 12.8 11.9*   HCT 38.6 37.3    276     Recent Labs     12/22/20  2321 12/22/20  0800    143   K 5.4 4.4    110   CO2 28 33*   * 96   BUN 13 19*   CREA 0.90 1.17   CA 8.8 8.8   ALB 3.2* 2.9*   ALT 24 23       Results for orders placed or performed during the hospital encounter of 12/22/20   EKG, 12 LEAD, INITIAL   Result Value Ref Range    Ventricular Rate 74 BPM    Atrial Rate 74 BPM    P-R Interval 124 ms    QRS Duration 88 ms    Q-T Interval 416 ms    QTC Calculation (Bezet) 461 ms    Calculated P Axis 70 degrees    Calculated R Axis 57 degrees    Calculated T Axis 69 degrees    Diagnosis       Normal sinus rhythm  Minimal voltage criteria for LVH, may be normal variant  ST elevation, consider early repolarization, pericarditis, or injury  Abnormal ECG  When compared with ECG of 20-DEC-2020 07:35,  Nonspecific T wave abnormality no longer evident in Inferior leads  T wave inversion no longer evident in Lateral leads  QT has lengthened         All Cardiac Markers in the last 24 hours:    Lab Results   Component Value Date/Time    TROIQ <0.02 12/22/2020 11:21 PM       Last Lipid:  No results found for: CHOL, CHOLX, CHLST, CHOLV, HDL, HDLP, LDL, LDLC, DLDLP, TGLX, TRIGL, TRIGP, CHHD, CHHDX    Signed By: Akanksha Arguelles MD     December 23, 2020

## 2020-12-23 NOTE — PROGRESS NOTES
Problem: Falls - Risk of  Goal: *Absence of Falls  Description: Document Radhames Rea Fall Risk and appropriate interventions in the flowsheet.   Outcome: Progressing Towards Goal  Note: Fall Risk Interventions:                 Elimination Interventions: Call light in reach, Patient to call for help with toileting needs, Stay With Me (per policy)              Problem: Patient Education: Go to Patient Education Activity  Goal: Patient/Family Education  Outcome: Progressing Towards Goal     Problem: Patient Education: Go to Patient Education Activity  Goal: Patient/Family Education  Outcome: Progressing Towards Goal     Problem: Heart Failure: Day 1  Goal: Off Pathway (Use only if patient is Off Pathway)  Outcome: Progressing Towards Goal  Goal: Activity/Safety  Outcome: Progressing Towards Goal  Goal: Consults, if ordered  Outcome: Progressing Towards Goal  Goal: Diagnostic Test/Procedures  Outcome: Progressing Towards Goal  Goal: Nutrition/Diet  Outcome: Progressing Towards Goal  Goal: Discharge Planning  Outcome: Progressing Towards Goal  Goal: Medications  Outcome: Progressing Towards Goal  Goal: Respiratory  Outcome: Progressing Towards Goal  Goal: Treatments/Interventions/Procedures  Outcome: Progressing Towards Goal  Goal: Psychosocial  Outcome: Progressing Towards Goal  Goal: *Oxygen saturation within defined limits  Outcome: Progressing Towards Goal  Goal: *Hemodynamically stable  Outcome: Progressing Towards Goal  Goal: *Optimal pain control at patient's stated goal  Outcome: Progressing Towards Goal  Goal: *Anxiety reduced or absent  Outcome: Progressing Towards Goal     Problem: Heart Failure: Day 2  Goal: Off Pathway (Use only if patient is Off Pathway)  Outcome: Progressing Towards Goal  Goal: Activity/Safety  Outcome: Progressing Towards Goal  Goal: Consults, if ordered  Outcome: Progressing Towards Goal  Goal: Diagnostic Test/Procedures  Outcome: Progressing Towards Goal  Goal: Nutrition/Diet  Outcome: Progressing Towards Goal  Goal: Discharge Planning  Outcome: Progressing Towards Goal  Goal: Medications  Outcome: Progressing Towards Goal  Goal: Respiratory  Outcome: Progressing Towards Goal  Goal: Treatments/Interventions/Procedures  Outcome: Progressing Towards Goal  Goal: Psychosocial  Outcome: Progressing Towards Goal  Goal: *Oxygen saturation within defined limits  Outcome: Progressing Towards Goal  Goal: *Hemodynamically stable  Outcome: Progressing Towards Goal  Goal: *Optimal pain control at patient's stated goal  Outcome: Progressing Towards Goal  Goal: *Anxiety reduced or absent  Outcome: Progressing Towards Goal  Goal: *Demonstrates progressive activity  Outcome: Progressing Towards Goal     Problem: Heart Failure: Day 3  Goal: Off Pathway (Use only if patient is Off Pathway)  Outcome: Progressing Towards Goal  Goal: Activity/Safety  Outcome: Progressing Towards Goal  Goal: Diagnostic Test/Procedures  Outcome: Progressing Towards Goal  Goal: Nutrition/Diet  Outcome: Progressing Towards Goal  Goal: Discharge Planning  Outcome: Progressing Towards Goal  Goal: Medications  Outcome: Progressing Towards Goal  Goal: Respiratory  Outcome: Progressing Towards Goal  Goal: Treatments/Interventions/Procedures  Outcome: Progressing Towards Goal  Goal: Psychosocial  Outcome: Progressing Towards Goal  Goal: *Oxygen saturation within defined limits  Outcome: Progressing Towards Goal  Goal: *Hemodynamically stable  Outcome: Progressing Towards Goal  Goal: *Optimal pain control at patient's stated goal  Outcome: Progressing Towards Goal  Goal: *Anxiety reduced or absent  Outcome: Progressing Towards Goal  Goal: *Demonstrates progressive activity  Outcome: Progressing Towards Goal     Problem: Heart Failure: Day 4  Goal: Off Pathway (Use only if patient is Off Pathway)  Outcome: Progressing Towards Goal  Goal: Activity/Safety  Outcome: Progressing Towards Goal  Goal: Diagnostic Test/Procedures  Outcome: Progressing Towards Goal  Goal: Nutrition/Diet  Outcome: Progressing Towards Goal  Goal: Discharge Planning  Outcome: Progressing Towards Goal  Goal: Medications  Outcome: Progressing Towards Goal  Goal: Respiratory  Outcome: Progressing Towards Goal  Goal: Treatments/Interventions/Procedures  Outcome: Progressing Towards Goal  Goal: Psychosocial  Outcome: Progressing Towards Goal  Goal: *Oxygen saturation within defined limits  Outcome: Progressing Towards Goal  Goal: *Hemodynamically stable  Outcome: Progressing Towards Goal  Goal: *Optimal pain control at patient's stated goal  Outcome: Progressing Towards Goal  Goal: *Anxiety reduced or absent  Outcome: Progressing Towards Goal  Goal: *Demonstrates progressive activity  Outcome: Progressing Towards Goal     Problem: Heart Failure: Day 5  Goal: Off Pathway (Use only if patient is Off Pathway)  Outcome: Progressing Towards Goal  Goal: Activity/Safety  Outcome: Progressing Towards Goal  Goal: Diagnostic Test/Procedures  Outcome: Progressing Towards Goal  Goal: Nutrition/Diet  Outcome: Progressing Towards Goal  Goal: Discharge Planning  Outcome: Progressing Towards Goal  Goal: Medications  Outcome: Progressing Towards Goal  Goal: Respiratory  Outcome: Progressing Towards Goal  Goal: Treatments/Interventions/Procedures  Outcome: Progressing Towards Goal  Goal: Psychosocial  Outcome: Progressing Towards Goal     Problem: Heart Failure: Discharge Outcomes  Goal: *Demonstrates ability to perform prescribed activity without shortness of breath or discomfort  Outcome: Progressing Towards Goal  Goal: *Left ventricular function assessment completed prior to or during stay, or planned for post-discharge  Outcome: Progressing Towards Goal  Goal: *ACEI prescribed if LVEF less than 40% and no contraindications or ARB prescribed  Outcome: Progressing Towards Goal  Goal: *Verbalizes understanding and describes prescribed diet  Outcome: Progressing Towards Goal  Goal: *Verbalizes understanding/describes prescribed medications  Outcome: Progressing Towards Goal  Goal: *Describes available resources and support systems  Description: (eg: Home Health, Palliative Care, Advanced Medical Directive)  Outcome: Progressing Towards Goal  Goal: *Describes smoking cessation resources  Outcome: Progressing Towards Goal  Goal: *Understands and describes signs and symptoms to report to providers(Stroke Metric)  Outcome: Progressing Towards Goal  Goal: *Describes/verbalizes understanding of follow-up/return appt  Description: (eg: to physicians, diabetes treatment coordinator, and other resources  Outcome: Progressing Towards Goal  Goal: *Describes importance of continuing daily weights and changes to report to physician  Outcome: Progressing Towards Goal

## 2020-12-23 NOTE — ED NOTES
Patient complaining of chest pain. When asked where pain is at, patient pointed to middle of chest. Will inform Dr. Padilla Ring of patient's complaint of chest pain.

## 2020-12-23 NOTE — PROGRESS NOTES
Therapeutic Substitution I-Vent    The medication order for:     - Arnuity Ellipta (200 mcg/day)    Has been interchanged to:     - Pulmicort Respules® (budesonide) 1mg/2ml BID    Per the [employer] Therapeutic Interchange Policy.

## 2020-12-23 NOTE — PROGRESS NOTES
Bedside shift change report given to Jazmyne (oncoming nurse) by Serjio Yip (offgoing nurse). Report included the following information Kardex, Intake/Output, MAR, Recent Results and Quality Measures.

## 2020-12-24 LAB
ANION GAP SERPL CALC-SCNC: 6 MMOL/L (ref 3–18)
ATRIAL RATE: 74 BPM
BASOPHILS # BLD: 0 K/UL (ref 0–0.1)
BASOPHILS NFR BLD: 0 % (ref 0–2)
BUN SERPL-MCNC: 26 MG/DL (ref 7–18)
BUN/CREAT SERPL: 23 (ref 12–20)
CALCIUM SERPL-MCNC: 8.4 MG/DL (ref 8.5–10.1)
CALCULATED P AXIS, ECG09: 70 DEGREES
CALCULATED R AXIS, ECG10: 57 DEGREES
CALCULATED T AXIS, ECG11: 69 DEGREES
CHLORIDE SERPL-SCNC: 105 MMOL/L (ref 100–111)
CO2 SERPL-SCNC: 31 MMOL/L (ref 21–32)
CREAT SERPL-MCNC: 1.12 MG/DL (ref 0.6–1.3)
DIAGNOSIS, 93000: NORMAL
DIFFERENTIAL METHOD BLD: ABNORMAL
EOSINOPHIL # BLD: 0 K/UL (ref 0–0.4)
EOSINOPHIL NFR BLD: 0 % (ref 0–5)
ERYTHROCYTE [DISTWIDTH] IN BLOOD BY AUTOMATED COUNT: 12.5 % (ref 11.6–14.5)
GLUCOSE SERPL-MCNC: 89 MG/DL (ref 74–99)
HCT VFR BLD AUTO: 40.5 % (ref 35–45)
HGB BLD-MCNC: 12.8 G/DL (ref 12–16)
LYMPHOCYTES # BLD: 3.4 K/UL (ref 0.9–3.6)
LYMPHOCYTES NFR BLD: 23 % (ref 21–52)
MAGNESIUM SERPL-MCNC: 1.9 MG/DL (ref 1.6–2.6)
MCH RBC QN AUTO: 28.6 PG (ref 24–34)
MCHC RBC AUTO-ENTMCNC: 31.6 G/DL (ref 31–37)
MCV RBC AUTO: 90.4 FL (ref 74–97)
MONOCYTES # BLD: 1.1 K/UL (ref 0.05–1.2)
MONOCYTES NFR BLD: 7 % (ref 3–10)
NEUTS SEG # BLD: 10.2 K/UL (ref 1.8–8)
NEUTS SEG NFR BLD: 70 % (ref 40–73)
P-R INTERVAL, ECG05: 124 MS
PLATELET # BLD AUTO: 318 K/UL (ref 135–420)
PMV BLD AUTO: 10.3 FL (ref 9.2–11.8)
POTASSIUM SERPL-SCNC: 3.7 MMOL/L (ref 3.5–5.5)
Q-T INTERVAL, ECG07: 416 MS
QRS DURATION, ECG06: 88 MS
QTC CALCULATION (BEZET), ECG08: 461 MS
RBC # BLD AUTO: 4.48 M/UL (ref 4.2–5.3)
SODIUM SERPL-SCNC: 142 MMOL/L (ref 136–145)
VENTRICULAR RATE, ECG03: 74 BPM
WBC # BLD AUTO: 14.7 K/UL (ref 4.6–13.2)

## 2020-12-24 PROCEDURE — 85025 COMPLETE CBC W/AUTO DIFF WBC: CPT

## 2020-12-24 PROCEDURE — 77030038269 HC DRN EXT URIN PURWCK BARD -A

## 2020-12-24 PROCEDURE — 94640 AIRWAY INHALATION TREATMENT: CPT

## 2020-12-24 PROCEDURE — 99232 SBSQ HOSP IP/OBS MODERATE 35: CPT | Performed by: INTERNAL MEDICINE

## 2020-12-24 PROCEDURE — 74011250637 HC RX REV CODE- 250/637: Performed by: INTERNAL MEDICINE

## 2020-12-24 PROCEDURE — 74011000250 HC RX REV CODE- 250: Performed by: STUDENT IN AN ORGANIZED HEALTH CARE EDUCATION/TRAINING PROGRAM

## 2020-12-24 PROCEDURE — 2709999900 HC NON-CHARGEABLE SUPPLY

## 2020-12-24 PROCEDURE — 77010033678 HC OXYGEN DAILY

## 2020-12-24 PROCEDURE — 74011000250 HC RX REV CODE- 250: Performed by: INTERNAL MEDICINE

## 2020-12-24 PROCEDURE — 80048 BASIC METABOLIC PNL TOTAL CA: CPT

## 2020-12-24 PROCEDURE — 36415 COLL VENOUS BLD VENIPUNCTURE: CPT

## 2020-12-24 PROCEDURE — 74011636637 HC RX REV CODE- 636/637: Performed by: INTERNAL MEDICINE

## 2020-12-24 PROCEDURE — 65270000029 HC RM PRIVATE

## 2020-12-24 PROCEDURE — 99218 HC RM OBSERVATION: CPT

## 2020-12-24 PROCEDURE — 83735 ASSAY OF MAGNESIUM: CPT

## 2020-12-24 PROCEDURE — 74011250637 HC RX REV CODE- 250/637: Performed by: STUDENT IN AN ORGANIZED HEALTH CARE EDUCATION/TRAINING PROGRAM

## 2020-12-24 PROCEDURE — 94762 N-INVAS EAR/PLS OXIMTRY CONT: CPT

## 2020-12-24 RX ORDER — ARFORMOTEROL TARTRATE 15 UG/2ML
15 SOLUTION RESPIRATORY (INHALATION)
Status: DISCONTINUED | OUTPATIENT
Start: 2020-12-24 | End: 2020-12-25 | Stop reason: HOSPADM

## 2020-12-24 RX ORDER — PREDNISONE 20 MG/1
40 TABLET ORAL
Status: DISCONTINUED | OUTPATIENT
Start: 2020-12-24 | End: 2020-12-25 | Stop reason: HOSPADM

## 2020-12-24 RX ORDER — CLONIDINE 0.2 MG/24H
1 PATCH, EXTENDED RELEASE TRANSDERMAL
Status: DISCONTINUED | OUTPATIENT
Start: 2020-12-24 | End: 2020-12-25 | Stop reason: HOSPADM

## 2020-12-24 RX ORDER — FAMOTIDINE 20 MG/1
20 TABLET, FILM COATED ORAL DAILY
Status: DISCONTINUED | OUTPATIENT
Start: 2020-12-25 | End: 2020-12-25 | Stop reason: HOSPADM

## 2020-12-24 RX ADMIN — PREDNISONE 40 MG: 20 TABLET ORAL at 11:10

## 2020-12-24 RX ADMIN — CITALOPRAM HYDROBROMIDE 20 MG: 20 TABLET ORAL at 06:37

## 2020-12-24 RX ADMIN — BUDESONIDE 1000 MCG: 1 SUSPENSION RESPIRATORY (INHALATION) at 07:00

## 2020-12-24 RX ADMIN — IPRATROPIUM BROMIDE 0.5 MG: 0.5 SOLUTION RESPIRATORY (INHALATION) at 21:07

## 2020-12-24 RX ADMIN — IPRATROPIUM BROMIDE 0.5 MG: 0.5 SOLUTION RESPIRATORY (INHALATION) at 07:00

## 2020-12-24 RX ADMIN — BUDESONIDE 1000 MCG: 1 SUSPENSION RESPIRATORY (INHALATION) at 21:07

## 2020-12-24 RX ADMIN — ATORVASTATIN CALCIUM 20 MG: 20 TABLET, FILM COATED ORAL at 21:05

## 2020-12-24 RX ADMIN — AMLODIPINE BESYLATE 5 MG: 5 TABLET ORAL at 09:07

## 2020-12-24 RX ADMIN — QUETIAPINE FUMARATE 50 MG: 50 TABLET, EXTENDED RELEASE ORAL at 09:07

## 2020-12-24 RX ADMIN — ALPRAZOLAM 0.5 MG: 0.5 TABLET ORAL at 14:47

## 2020-12-24 RX ADMIN — Medication 10 ML: at 06:38

## 2020-12-24 RX ADMIN — Medication 10 ML: at 16:21

## 2020-12-24 RX ADMIN — Medication 10 ML: at 05:36

## 2020-12-24 RX ADMIN — ALPRAZOLAM 0.5 MG: 0.5 TABLET ORAL at 09:07

## 2020-12-24 RX ADMIN — ARFORMOTEROL TARTRATE 15 MCG: 15 SOLUTION RESPIRATORY (INHALATION) at 21:07

## 2020-12-24 RX ADMIN — ASPIRIN 81 MG CHEWABLE TABLET 81 MG: 81 TABLET CHEWABLE at 09:07

## 2020-12-24 RX ADMIN — LISINOPRIL 20 MG: 20 TABLET ORAL at 09:07

## 2020-12-24 NOTE — PROGRESS NOTES
Hospitalist Progress Note    Patient: Lisseth Russ Age: 63 y.o. : 1965 MR#: [id number] SSN: [id number]  Date/Time: 2020 10:31 AM    DOA: 2020  PCP: Fernanda Mendez MD    Subjective:     Cough and wheezing, still with shortness of breath. She does not think she is ready to go home. No pain complaint   No fever. Note leukocytosis       Interval Hospital Course:        ROS: No current fever/chills, no headache, no dizziness, no facial pain, no sinus congestion,   No swallowing pain, No chest pain, no palpitation, ++ shortness of breath, no abd pain,  No diarrhea, no urinary complaint, no leg pain or swelling      Assessment/Plan:     1. COPD exacerbation   2. Chronic hypoxic respiratory failure   3. Tobacco dependence, though she said she quit smooking   4. No evidence of CHF   5. HTN   6. Dyslipidemia   7. H/o prolonged heroin abuse     Add Prednisone back today, cont budesonide/brovana. Duoneb. Appreciate cardiology consult.  No need further Echo  Amlodipine and lisinopril and clonidine patch   Cont seroquel   Avoid narcotic   pepcid   ICS   OOB     Full code     Additional Notes:    Time spent >35 minutes    Case discussed with:  [x]Patient  []Family  [x]Nursing  [x]Case Management  DVT Prophylaxis:  [x]Lovenox  []Hep SQ  []SCDs  []Coumadin   []On Heparin gtt    Signed By: Lori Nolasco MD     2020 10:31 AM              Objective:   VS:   Visit Vitals  BP (!) 184/97 (BP 1 Location: Right arm, BP Patient Position: Supine)   Pulse 68   Temp 97.8 °F (36.6 °C)   Resp 18   Ht 4' 11\" (1.499 m)   Wt 74.6 kg (164 lb 8 oz)   LMP 2009   SpO2 96%   BMI 33.22 kg/m²      Tmax/24hrs: Temp (24hrs), Av.1 °F (36.7 °C), Min:97.8 °F (36.6 °C), Max:98.4 °F (36.9 °C)      Intake/Output Summary (Last 24 hours) at 2020 1031  Last data filed at 2020 3829  Gross per 24 hour   Intake 1160 ml   Output 400 ml   Net 760 ml       Tele:   General:  Cooperative, Not in acute distress, speaks in full sentence while in bed  HEENT: PERRL, EOMI, supple neck, no JVD, dry oral mucosa  Cardiovascular: S1S2 regular, no rub/gallop   Pulmonary: air entry bilaterally, +++ wheezing, no crackle  GI:  Soft, non tender, non distended, +bs, no guarding   Extremities:  No pedal edema, +distal pulses appreciated   Neuro: AOx3, moving all extremities, no gross deficit. :     Additional:       Current Facility-Administered Medications   Medication Dose Route Frequency    arformoteroL (BROVANA) neb solution 15 mcg  15 mcg Nebulization BID RT    cloNIDine (CATAPRES) 0.2 mg/24 hr patch 1 Patch  1 Patch TransDERmal Q7D    predniSONE (DELTASONE) tablet 40 mg  40 mg Oral DAILY WITH BREAKFAST    sodium chloride (NS) flush 5-40 mL  5-40 mL IntraVENous Q8H    sodium chloride (NS) flush 5-40 mL  5-40 mL IntraVENous PRN    acetaminophen (TYLENOL) tablet 650 mg  650 mg Oral Q6H PRN    Or    acetaminophen (TYLENOL) suppository 650 mg  650 mg Rectal Q6H PRN    polyethylene glycol (MIRALAX) packet 17 g  17 g Oral DAILY PRN    enoxaparin (LOVENOX) injection 40 mg  40 mg SubCUTAneous DAILY    ondansetron (ZOFRAN ODT) tablet 4 mg  4 mg Oral Q8H PRN    Or    ondansetron (ZOFRAN) injection 4 mg  4 mg IntraVENous Q6H PRN    citalopram (CELEXA) tablet 20 mg  20 mg Oral 7am    lisinopriL (PRINIVIL, ZESTRIL) tablet 20 mg  20 mg Oral DAILY    atorvastatin (LIPITOR) tablet 20 mg  20 mg Oral QHS    aspirin chewable tablet 81 mg  81 mg Oral DAILY    amLODIPine (NORVASC) tablet 5 mg  5 mg Oral DAILY    albuterol (PROVENTIL VENTOLIN) nebulizer solution 2.5 mg  2.5 mg Nebulization Q4H PRN    ipratropium (ATROVENT) 0.02 % nebulizer solution 0.5 mg  0.5 mg Nebulization TID RT    budesonide (PULMICORT) 1,000 mcg/2 mL nebulizer susp  1,000 mcg Nebulization BID RT    ALPRAZolam (XANAX) tablet 0.5 mg  0.5 mg Oral QID PRN    QUEtiapine SR (SEROquel XR) tablet 50 mg  50 mg Oral DAILY            Lab/Data Review:  Labs: Results:       Chemistry Recent Labs     12/24/20 0233 12/22/20 2321 12/22/20  0800   GLU 89 106* 96    139 143   K 3.7 5.4 4.4    106 110   CO2 31 28 33*   BUN 26* 13 19*   CREA 1.12 0.90 1.17   BUCR 23* 14 16   AGAP 6 5 0*   CA 8.4* 8.8 8.8     Recent Labs     12/22/20 2321 12/22/20  0800   ALT 24 23   TP 7.0 6.8   ALB 3.2* 2.9*   GLOB 3.8 3.9   AGRAT 0.8 0.7*      CBC w/Diff Recent Labs     12/24/20 0233 12/22/20 2321 12/22/20  0800   WBC 14.7* 10.9 7.9   RBC 4.48 4.35 4.08*   HGB 12.8 12.8 11.9*   HCT 40.5 38.6 37.3   MCV 90.4 88.7 91.4   MCH 28.6 29.4 29.2   MCHC 31.6 33.2 31.9   RDW 12.5 12.2 12.4    327 276   GRANS 70 84* 63   LYMPH 23 12* 30   EOS 0 0 1      Coagulation No results for input(s): PTP, INR, APTT, INREXT in the last 72 hours. Iron/Ferritin Lab Results   Component Value Date/Time    Ferritin 41 09/09/2020 05:09 AM       BNP    Cardiac Enzymes Lab Results   Component Value Date/Time    CK 58 12/20/2020 07:45 AM    CK - MB 1.8 12/20/2020 07:45 AM    CK-MB Index 3.1 12/20/2020 07:45 AM    Troponin-I <0.015 12/07/2015 05:23 PM    Troponin-I, QT <0.02 12/22/2020 11:21 PM    BNP 19.9 12/07/2015 05:23 PM        Lactic Acid    Thyroid Studies          All Micro Results     None            Images:    CT (Most Recent). XRAY (Most Recent) XR Results (most recent):  Results from Hospital Encounter encounter on 12/22/20   XR CHEST PORT    Narrative EXAM: XR CHEST PORT    INDICATION: 63 years Female. chest pain. ADDITIONAL HISTORY: None. TECHNIQUE: Frontal view of the chest.    COMPARISON: 12/22/2020 at 0730 hours    FINDINGS:    Artifact from patient's garment straps limits assessment of the mid chest.    The cardiac silhouette is at the upper limits of normal in size. Mild  cephalization of pulmonary vasculature. Tortuosity of the thoracic aorta. No confluent consolidation is appreciated. No evidence of pleural effusion or pneumothorax.  There is no evidence of acute  osseous abnormality. Impression IMPRESSION:  1. Mild pulmonary vascular congestion. 2.  Borderline cardiac silhouette enlargement. EKG No results found for this or any previous visit.      2D ECHO

## 2020-12-24 NOTE — PROGRESS NOTES
Patient seen and examined in f/u from admission earlier today. Much calmer now. Previously yelling and curing at the nursing staff. Frustrated that she cannot get Percocet and ongoing Xanax. States that she has not been to her methadone clinic since 12/11 but buys it off the street from a friend who attends her methadone clnic with her. Continue duonebs, pulmicort  Has home O2 arranged. Cardiology has seen  Patient refused TTE. Lengthy discussion with the patient's , Ms. Rickygladys who will assist in making appointments for future methadone clinica and psych care. Reconciled psych meds- on celexa and seroquel but has been off these meds for quite sometime. Advised patient that her ongoing SOB is likely realted to her ongoing smoking and cocaine use. Educated the patient that her underlying lung condition will likley not improve, but will definitely continue to worsen if she is non-compliant with her pulm meds.     Anticipate discharge in am.

## 2020-12-24 NOTE — PROGRESS NOTES
Problem: Falls - Risk of  Goal: *Absence of Falls  Description: Document Naun Cea Fall Risk and appropriate interventions in the flowsheet.   Outcome: Progressing Towards Goal  Note: Fall Risk Interventions:            Medication Interventions: Bed/chair exit alarm, Patient to call before getting OOB, Teach patient to arise slowly    Elimination Interventions: Bed/chair exit alarm, Call light in reach, Patient to call for help with toileting needs, Stay With Me (per policy), Toilet paper/wipes in reach, Toileting schedule/hourly rounds              Problem: Patient Education: Go to Patient Education Activity  Goal: Patient/Family Education  Outcome: Progressing Towards Goal     Problem: Patient Education: Go to Patient Education Activity  Goal: Patient/Family Education  Outcome: Progressing Towards Goal     Problem: Heart Failure: Day 3  Goal: Off Pathway (Use only if patient is Off Pathway)  Outcome: Progressing Towards Goal  Goal: Activity/Safety  Outcome: Progressing Towards Goal  Goal: Diagnostic Test/Procedures  Outcome: Progressing Towards Goal  Goal: Nutrition/Diet  Outcome: Progressing Towards Goal  Goal: Discharge Planning  Outcome: Progressing Towards Goal  Goal: Medications  Outcome: Progressing Towards Goal  Goal: Respiratory  Outcome: Progressing Towards Goal  Goal: Treatments/Interventions/Procedures  Outcome: Progressing Towards Goal  Goal: Psychosocial  Outcome: Progressing Towards Goal  Goal: *Oxygen saturation within defined limits  Outcome: Progressing Towards Goal  Goal: *Hemodynamically stable  Outcome: Progressing Towards Goal  Goal: *Optimal pain control at patient's stated goal  Outcome: Progressing Towards Goal  Goal: *Anxiety reduced or absent  Outcome: Progressing Towards Goal  Goal: *Demonstrates progressive activity  Outcome: Progressing Towards Goal     Problem: Heart Failure: Day 4  Goal: Off Pathway (Use only if patient is Off Pathway)  Outcome: Progressing Towards Goal  Goal: Activity/Safety  Outcome: Progressing Towards Goal  Goal: Diagnostic Test/Procedures  Outcome: Progressing Towards Goal  Goal: Nutrition/Diet  Outcome: Progressing Towards Goal  Goal: Discharge Planning  Outcome: Progressing Towards Goal  Goal: Medications  Outcome: Progressing Towards Goal  Goal: Respiratory  Outcome: Progressing Towards Goal  Goal: Treatments/Interventions/Procedures  Outcome: Progressing Towards Goal  Goal: Psychosocial  Outcome: Progressing Towards Goal  Goal: *Oxygen saturation within defined limits  Outcome: Progressing Towards Goal  Goal: *Hemodynamically stable  Outcome: Progressing Towards Goal  Goal: *Optimal pain control at patient's stated goal  Outcome: Progressing Towards Goal  Goal: *Anxiety reduced or absent  Outcome: Progressing Towards Goal  Goal: *Demonstrates progressive activity  Outcome: Progressing Towards Goal     Problem: Heart Failure: Day 5  Goal: Off Pathway (Use only if patient is Off Pathway)  Outcome: Progressing Towards Goal  Goal: Activity/Safety  Outcome: Progressing Towards Goal  Goal: Diagnostic Test/Procedures  Outcome: Progressing Towards Goal  Goal: Nutrition/Diet  Outcome: Progressing Towards Goal  Goal: Discharge Planning  Outcome: Progressing Towards Goal  Goal: Medications  Outcome: Progressing Towards Goal  Goal: Respiratory  Outcome: Progressing Towards Goal  Goal: Treatments/Interventions/Procedures  Outcome: Progressing Towards Goal  Goal: Psychosocial  Outcome: Progressing Towards Goal     Problem: Heart Failure: Discharge Outcomes  Goal: *Demonstrates ability to perform prescribed activity without shortness of breath or discomfort  Outcome: Progressing Towards Goal  Goal: *Left ventricular function assessment completed prior to or during stay, or planned for post-discharge  Outcome: Progressing Towards Goal  Goal: *ACEI prescribed if LVEF less than 40% and no contraindications or ARB prescribed  Outcome: Progressing Towards Goal  Goal: *Verbalizes understanding and describes prescribed diet  Outcome: Progressing Towards Goal  Goal: *Verbalizes understanding/describes prescribed medications  Outcome: Progressing Towards Goal  Goal: *Describes available resources and support systems  Description: (eg: Home Health, Palliative Care, Advanced Medical Directive)  Outcome: Progressing Towards Goal  Goal: *Describes smoking cessation resources  Outcome: Progressing Towards Goal  Goal: *Understands and describes signs and symptoms to report to providers(Stroke Metric)  Outcome: Progressing Towards Goal  Goal: *Describes/verbalizes understanding of follow-up/return appt  Description: (eg: to physicians, diabetes treatment coordinator, and other resources  Outcome: Progressing Towards Goal  Goal: *Describes importance of continuing daily weights and changes to report to physician  Outcome: Progressing Towards Goal     Problem: General Medical Care Plan  Goal: *Vital signs within specified parameters  Outcome: Progressing Towards Goal  Goal: *Labs within defined limits  Outcome: Progressing Towards Goal  Goal: *Absence of infection signs and symptoms  Outcome: Progressing Towards Goal  Goal: *Optimal pain control at patient's stated goal  Outcome: Progressing Towards Goal  Goal: *Skin integrity maintained  Outcome: Progressing Towards Goal  Goal: *Fluid volume balance  Outcome: Progressing Towards Goal  Goal: *Optimize nutritional status  Outcome: Progressing Towards Goal  Goal: *Anxiety reduced or absent  Outcome: Progressing Towards Goal  Goal: *Progressive mobility and function (eg: ADL's)  Outcome: Progressing Towards Goal     Problem: Patient Education: Go to Patient Education Activity  Goal: Patient/Family Education  Outcome: Progressing Towards Goal     Problem: Chronic Obstructive Pulmonary Disease (COPD)  Goal: *Oxygen saturation during activity within specified parameters  Outcome: Progressing Towards Goal  Goal: *Able to remain out of bed as prescribed  Outcome: Progressing Towards Goal  Goal: *Absence of hypoxia  Outcome: Progressing Towards Goal  Goal: *Optimize nutritional status  Outcome: Progressing Towards Goal     Problem: Patient Education: Go to Patient Education Activity  Goal: Patient/Family Education  Outcome: Progressing Towards Goal

## 2020-12-24 NOTE — PROGRESS NOTES
[facility]  -  Progress Note      Patient: Kelsie Tanner MRN: [id number]  SSN: [id number]    YOB: 1965  Age: 63 y.o. Sex: female      Admit Date: 12/22/2020    Assessment:     Hospital Problems  Date Reviewed: 10/23/2019          Codes Class Noted POA    CHF (congestive heart failure) ([facility]) ICD-10-CM: I50.9  ICD-9-CM: 428.0  12/23/2020 Unknown            -Acute shortness of breath.  Not CHF more consistent with COPD exacerbation.  Her lung sounds are more consistent with a pulmonary etiology with diffuse wheezing.  Historically her LV function has been normal.    -Poorly controlled hypertension. Would aggressively treat.  -Tobacco use until 1 month ago. She likely has COPD. She has early morning shortness of breath and sputum production as well as cough. -Transient left bundle branch block.  This has been present in the past.  -Acute partial small bowel obstruction- NPO  -HTN out of control and on NTG drip and starting hydralazine.  -Dyslipidemia  -History of prolonged heroin abuse.  Patient states she was a daily user up until last year since starting on a methadone program.        She had coronary angiography performed in March 2020 which showed normal coronary anatomy without obstructive disease. The results are as follows:  · Normal right dominant epicardial coronary circulation without significant stenosis. · Normal LV function with EF 65%. · No further coronary evaluation needed.      She had an echocardiogram done in October 2020 which showed overall normal LV function. The results are as follows:  · LV: Estimated LVEF is 60 - 65%. Visually measured ejection fraction. Normal cavity size and systolic function (ejection fraction normal). Moderate concentric hypertrophy. Wall motion: normal.  · PA: Pulmonary arterial systolic pressure is 25 mmHg. Plan:     No new cardiac recommendations at this time.   She needs aggressive blood pressure management; have added clonidine patch 0.2 mg. If further blood pressure control needed, would increase amlodipine. She needs low-salt diet; she may also have better blood pressure response with diuretic regimen if her renal function tolerates. Subjective:     No new complaints. Objective:      Patient Vitals for the past 8 hrs:   Temp Pulse Resp BP SpO2   12/24/20 0758 97.8 °F (36.6 °C) 68 18 (!) 184/97 96 %   12/24/20 0701     100 %   12/24/20 0424 97.9 °F (36.6 °C) 71 20 (!) 189/81 99 %         Patient Vitals for the past 96 hrs:   Weight   12/24/20 0632 74.6 kg (164 lb 8 oz)   12/23/20 0541 74.8 kg (164 lb 12.8 oz)         Intake/Output Summary (Last 24 hours) at 12/24/2020 0954  Last data filed at 12/24/2020 4827  Gross per 24 hour   Intake 1160 ml   Output 400 ml   Net 760 ml       Physical Exam:  General:  alert, cooperative, no distress, appears stated age  Neck:  no JVD  Lungs:   wheezes diffusely occasionally  Heart:  regular rate and rhythm  Abdomen:  no guarding or rigidity  Extremities:  no edema    Data Review:     Labs: Results:       Chemistry Recent Labs     12/24/20 0233 12/22/20 2321 12/22/20  0800   GLU 89 106* 96    139 143   K 3.7 5.4 4.4    106 110   CO2 31 28 33*   BUN 26* 13 19*   CREA 1.12 0.90 1.17   CA 8.4* 8.8 8.8   MG 1.9  --   --    AGAP 6 5 0*   BUCR 23* 14 16   AP  --  82 77   TP  --  7.0 6.8   ALB  --  3.2* 2.9*   GLOB  --  3.8 3.9   AGRAT  --  0.8 0.7*      CBC w/Diff Recent Labs     12/24/20 0233 12/22/20 2321 12/22/20  0800   WBC 14.7* 10.9 7.9   RBC 4.48 4.35 4.08*   HGB 12.8 12.8 11.9*   HCT 40.5 38.6 37.3    327 276   GRANS 70 84* 63   LYMPH 23 12* 30   EOS 0 0 1      Cardiac Enzymes No results found for: CPK, CK, CKMMB, CKMB, RCK3, CKMBT, CKNDX, CKND1, JOSE, TROPT, TROIQ, SURY, TROPT, TNIPOC, BNP, BNPP   Coagulation No results for input(s): PTP, INR, APTT, INREXT in the last 72 hours.     Lipid Panel No results found for: CHOL, CHOLPOCT, CHOLX, [address], CHOLV, 922275, HDL, HDLP, LDL, LDLC, DLDLP, 019933, VLDLC, VLDL, TGLX, TRIGL, TRIGP, TGLPOCT, CHHD, CHHDX   BNP Lab Results   Component Value Date/Time    BNP 19.9 12/07/2015 05:23 PM    BNP 3.4 11/01/2015 03:20 PM      Liver Enzymes Recent Labs     12/22/20  2321   TP 7.0   ALB 3.2*   AP 82      Digoxin    Thyroid Studies Lab Results   Component Value Date/Time    TSH 0.18 (L) 09/27/2019 10:50 PM

## 2020-12-24 NOTE — PROGRESS NOTES
Bedside and Verbal shift change report given to Conner Yates RN (oncoming nurse) by Ankita Dave (offgoing nurse). Report included the following information SBAR, Kardex, MAR and Recent Results.     SITUATION:  Code Status: Full Code  Reason for Admission: CHF (congestive heart failure) ([address] [I50.9]  Hospital day: 1  Problem List:       Hospital Problems  Date Reviewed: 10/23/2019          Codes Class Noted POA    CHF (congestive heart failure) ([address] ICD-10-CM: I50.9  ICD-9-CM: 428.0  12/23/2020 Unknown              BACKGROUND:   Past Medical History:   Past Medical History:   Diagnosis Date    CHF (congestive heart failure) ([address]     Chronic respiratory failure with hypoxia ([address] 9/7/2020    CKD (chronic kidney disease) stage 3, GFR 30-59 ml/min 10/31/2019    Cocaine abuse ([address] 11/26/2017    COPD (chronic obstructive pulmonary disease) with chronic bronchitis ([address] 12/19/2017    Dependence on supplemental oxygen     Depression 3/22/2020    Drug-seeking behavior 11/19/2016    Endocrine disease     thyroid issues    Essential hypertension 3/10/2017    Fe deficiency anemia 10/27/2012    Fibromyalgia 12/16/2016    Gastroesophageal reflux disease without esophagitis 10/10/2016    Gastrointestinal disorder     \"blockage in my stomach\"    Hypercholesterolemia     Hypertension     Hypertensive heart failure ([address] 12/19/2017    Morbid obesity due to excess calories ([address] 3/10/2017    NSTEMI (non-ST elevated myocardial infarction) ([address] 3/22/2020    On home O2 12/3/2015    Overview:  2L at home per pt for approx 8 years    Polysubstance abuse ([address] 9/15/2018    Respiratory failure ([address] 1/11/2017    S/P hernia repair 10/31/2019    Sleep apnea 12/19/2017    T wave inversion in EKG 3/9/2019    Tobacco use 7/44/1858    Uncomplicated severe persistent asthma 12/13/2016    Vocal cord disease 12/7/2016      Patient taking anticoagulants yes    Patient has a defibrillator: no    History of shots YES for example, flu, pneumonia, tetanus   Isolation History NO for example, MRSA, CDiff    ASSESSMENT:  Changes in Assessment Throughout Shift: None  Significant Changes in 24 hours (for example, RR/code, fall)  Patient has Central Line: no   Patient has Walker Cath: no      Mobility Issues  PT  IV Patency  OR Checklist  Pending Tests    Last Vitals:  Vitals w/ MEWS Score (last day)     Date/Time MEWS Score Pulse Resp Temp BP Level of Consciousness SpO2    12/24/20 0701              100 %    12/24/20 0424  1  71  20  97.9 °F (36.6 °C)  (!) 189/81  Alert  99 %    12/23/20 2345  1  79  18  98.2 °F (36.8 °C)  (!) 152/80  Alert  98 %    12/23/20 1958  1  72  17  98.4 °F (36.9 °C)  (!) 156/78  Alert  99 %    12/23/20 1544  1  83  17  98 °F (36.7 °C)  (!) 188/80  Alert  95 %    12/23/20 1200    79              12/23/20 1102  1  79  18  98.4 °F (36.9 °C)  (!) 160/77  Alert  98 %    12/23/20 0817  1  71  18  97.7 °F (36.5 °C)  130/73  Alert  94 %    12/23/20 0748              100 %    12/23/20 0528  1  67  18  98.2 °F (36.8 °C)  (!) 146/71  Alert  100 %    12/23/20 0412  1  70  18  97.7 °F (36.5 °C)  (!) 146/83  Alert  97 %            PAIN    Pain Assessment    Pain Intensity 1: 0 (12/24/20 0443)              Patient Stated Pain Goal: 0  Intervention effective: N/A  Time of last intervention: N/A Reassessment Completed: yes   Other actions taken for pain: Distraction    Last 3 Weights:  Last 3 Recorded Weights in this Encounter    12/23/20 0541 12/24/20 4278   Weight: 74.8 kg (164 lb 12.8 oz) 74.6 kg (164 lb 8 oz)   Weight change: -0.136 kg (-4.8 oz)    INTAKE/OUPUT    Current Shift: No intake/output data recorded.     Last three shifts: 12/22 1901 - 12/24 0700  In: 1520 [P.O.:1520]  Out: 900 [Urine:900]    RECOMMENDATIONS AND DISCHARGE PLANNING  Patient needs and requests: Assistance with ADLs    Pending tests/procedures: Labs     Discharge plan for patient: Home    Discharge planning Needs or Barriers: None    Estimated Discharge Date: 12/31 Posted on White[facility] in [facility]: yes       \"HEALS\" SAFETY CHECK  A safety check occurred in the patient's room between off going nurse and oncoming nurse listed above. The safety check included the below items:    H  High Alert Medications Verify all high alert medication drips (heparin, PCA, etc.)  E  Equipment Suction is set up for ALL patients (with rios)  Red plugs utilized for all equipment (IV pumps, etc.)  WOWs wiped down at end of shift. Room stocked with oxygen, suction, and other unit-specific supplies  A  Alarms Bed alarm is set for fall risk patients  Ensure chair alarm is in place and activated if patient is up in a chair  L  Lines Check IV for any infiltration  Walker bag is empty if patient has a Walker   Tubing and IV bags are labeled  S  Safety  Room is clean, patient is clean, and equipment is clean. Hallways are clear from equipment besides carts. Fall bracelet on for fall risk patients  Ensure room is clear and free of clutter  Suction is set up for ALL patients (with rios)  Hallways are clear from equipment besides carts.    Isolation precautions followed, supplies available outside room, sign posted    [facility]

## 2020-12-24 NOTE — PROGRESS NOTES
Reason for Readmission:    CHF (congestive heart failure) ([facility]) [I50.9]         RUR Score and Risk Level:      96     Level of Readmission:    3   (1-3)   (1 - First Readmission, 2- Second Readmission within 30 days or multiple admissions over previous 90 days, 3- Greater than two readmissions within 30 days or multiple admissions over previous 90 days)      Care Conference scheduled:   (consider for all patient's with readmission level of 2, should definitely be scheduled for all patients with readmission level of 3)       Resources/supports as identified by patient/family:         Top Challenges facing patient (as identified by patient/family and CM): Finances/Medication cost?     No   Transportation      Family/medicaid cab  Support system or lack thereof? Family support  Living arrangements? Lives alone   Self-care/ADLs/Cognition? Independent         Current Advanced Directive/Advance Care Plan:             Plan for utilizing home health: To be determined             Likelihood of additional readmission:                Transition of Care Plan:    Based on readmission, the patient's previous Plan of Care   has been evaluated and/or modified. The current Transition of Care Plan is:            Initial assessment completed with patient. Cognitive status of patient: oriented to time, place, person and situation. Face sheet information confirmed:  yes. The patient designates her daughter Marlow Phoenix  to participate in her discharge plan and to receive any needed information. This patient lives in a  Home. Patient is able to navigate steps as needed. Prior to hospitalization, patient was considered to be independent with ADLs/IADLS : yes . Patient has a current ACP document on file: yes     The daughter or medicaid cab will be available to transport patient home upon discharge. The patient already has oxygen 3L supplied by [employer] medical equipment available in the home. Patient is not currently active with home health. Patient has not stayed in a skilled nursing facility or rehab. Was  stay within last 60 days : no. This patient is on dialysis :no   Currently, the discharge plan is Home vs home health    The patient states that she can obtain her medications from the pharmacy, and take her medications as directed. Patient's current insurance is [facility]. Care Management Interventions  PCP Verified by CM: Yes  Mode of Transport at Discharge:  Other (see comment)  Transition of Care Consult (CM Consult): Discharge Planning  Current Support Network: Lives Alone  Confirm Follow Up Transport: Self  Discharge Location  Discharge Placement: Home(vs home health)      Readmission Assessment  Number of days since last admission?: 1-7 days  Previous disposition: Home Alone  What was the patient's/caregiver's perception as to why they think they needed to return back to the hospital?: Other (Comment)(pt stated she needed oxygen)  Did you visit your Primary Care Physician after you left the hospital, before you returned this time?: No  Why weren't you able to visit your PCP?: Other (Comment)(came back to ED before sccheduled appointment)  Did you see a specialist, such as Cardiac, Pulmonary, Orthopedic Physician, etc. after you left the hospital?: No  Who advised the patient to return to the hospital?: Self-referral  In our efforts to provide the best possible care to you and others like you, can you think of anything that we could have done to help you after you left the hospital the first time, so that you might not have needed to return so soon?: Additional Community resources available for illness support        LINDSEY Cho RN  Care Management  Pager: [phone]

## 2020-12-25 ENCOUNTER — HOME HEALTH ADMISSION (OUTPATIENT)
Dept: HOME HEALTH SERVICES | Facility: HOME HEALTH | Age: 55
End: 2020-12-25

## 2020-12-25 VITALS
DIASTOLIC BLOOD PRESSURE: 74 MMHG | OXYGEN SATURATION: 97 % | HEART RATE: 70 BPM | SYSTOLIC BLOOD PRESSURE: 166 MMHG | WEIGHT: 164.5 LBS | BODY MASS INDEX: 33.16 KG/M2 | HEIGHT: 59 IN | RESPIRATION RATE: 20 BRPM | TEMPERATURE: 98.4 F

## 2020-12-25 PROCEDURE — 74011250637 HC RX REV CODE- 250/637: Performed by: INTERNAL MEDICINE

## 2020-12-25 PROCEDURE — 74011250637 HC RX REV CODE- 250/637: Performed by: STUDENT IN AN ORGANIZED HEALTH CARE EDUCATION/TRAINING PROGRAM

## 2020-12-25 PROCEDURE — 74011636637 HC RX REV CODE- 636/637: Performed by: INTERNAL MEDICINE

## 2020-12-25 PROCEDURE — 99218 HC RM OBSERVATION: CPT

## 2020-12-25 PROCEDURE — 99239 HOSP IP/OBS DSCHRG MGMT >30: CPT | Performed by: INTERNAL MEDICINE

## 2020-12-25 RX ORDER — FLUTICASONE FUROATE AND VILANTEROL TRIFENATATE 200; 25 UG/1; UG/1
1 POWDER RESPIRATORY (INHALATION) DAILY
Qty: 1 INHALER | Refills: 3 | Status: ON HOLD | OUTPATIENT
Start: 2020-12-25 | End: 2021-01-18 | Stop reason: SDUPTHER

## 2020-12-25 RX ORDER — MONTELUKAST SODIUM 10 MG/1
10 TABLET ORAL
Qty: 30 TAB | Refills: 1 | Status: SHIPPED | OUTPATIENT
Start: 2020-12-25

## 2020-12-25 RX ORDER — PREDNISONE 20 MG/1
40 TABLET ORAL DAILY
Qty: 16 TAB | Refills: 0 | OUTPATIENT
Start: 2020-12-25 | End: 2020-12-31

## 2020-12-25 RX ORDER — ALBUTEROL SULFATE 1.25 MG/3ML
1.25 SOLUTION RESPIRATORY (INHALATION)
Qty: 25 EACH | Refills: 5 | Status: SHIPPED | OUTPATIENT
Start: 2020-12-25 | End: 2021-01-29

## 2020-12-25 RX ORDER — ALBUTEROL SULFATE 90 UG/1
2 AEROSOL, METERED RESPIRATORY (INHALATION)
Qty: 1 INHALER | Refills: 4 | Status: SHIPPED | OUTPATIENT
Start: 2020-12-25 | End: 2020-12-31 | Stop reason: SDUPTHER

## 2020-12-25 RX ADMIN — LISINOPRIL 20 MG: 20 TABLET ORAL at 09:20

## 2020-12-25 RX ADMIN — FAMOTIDINE 20 MG: 20 TABLET, FILM COATED ORAL at 09:20

## 2020-12-25 RX ADMIN — ASPIRIN 81 MG CHEWABLE TABLET 81 MG: 81 TABLET CHEWABLE at 09:21

## 2020-12-25 RX ADMIN — ALPRAZOLAM 0.5 MG: 0.5 TABLET ORAL at 08:01

## 2020-12-25 RX ADMIN — AMLODIPINE BESYLATE 5 MG: 5 TABLET ORAL at 09:20

## 2020-12-25 RX ADMIN — PREDNISONE 40 MG: 20 TABLET ORAL at 09:20

## 2020-12-25 RX ADMIN — Medication 10 ML: at 06:00

## 2020-12-25 RX ADMIN — CITALOPRAM HYDROBROMIDE 20 MG: 20 TABLET ORAL at 09:20

## 2020-12-25 RX ADMIN — QUETIAPINE FUMARATE 50 MG: 50 TABLET, EXTENDED RELEASE ORAL at 09:20

## 2020-12-25 NOTE — DISCHARGE SUMMARY
Discharge Summary    Patient: Margie Saldivar               Sex: female          DOA: 12/22/2020         YOB: 1965      Age:  63 y.o.        LOS:  LOS: 2 days                Admit Date: 12/22/2020    Discharge Date: 12/25/2020    Primary care physician: Erin Lafleur MD    Discharge Diagnoses:    1. COPD/asthma exacerbation, improved   2. Chronic hypoxic respiratory failure   3. Tobacco dependence, though she said she quit smooking   4. No evidence of heart failure    5. Hypertension    6. Dyslipidemia   7. H/o prolonged heroin abuse     Discharge Condition: Good  Disposition: home with home health  Code Status:*full code     Follow up for Primary Care Physician:  1) she continues on prednisone and bronchodilator, please monitor for clinical improvement. 2)  She needs follow up with pulmonary for further care   3) she needs follow up for her pain management care     Hospital Course:   63 y.o female with HTN, COPD, chronic diastolic CHF, chronic hypoxia respiratory failure, depression, chronic pain, presented with shortness of breath. She was admitted for concern of COPD exacerbation vs CHF. She was given steroid and diuretic with improvement. Cardiology consulted and deferred further workup as she is not in heart failure. She was started back on oral steroid and bronchodilator with improvement. Last 24 Hours: she has been up and walking in the hallway. She wants to go home. She asked for xanax and pain medications at discharge. No fever.  Wheezing and shortness of breath improved     ROS: No current fever/chills, no headache, no dizziness, no facial pain, no sinus congestion,   No swallowing pain, No chest pain, no palpitation, + shortness of breath, no abd pain,  No diarrhea, no urinary complaint, no leg pain or swelling    VS:   Visit Vitals  BP (!) 166/74 (BP 1 Location: Right arm, BP Patient Position: At rest)   Pulse 70   Temp 98.4 °F (36.9 °C)   Resp 20   Ht 4' 11\" (1.499 m)   Wt 74.6 kg (164 lb 8 oz)   LMP 2009   SpO2 97%   BMI 33.22 kg/m²      Tmax/24hrs: Temp (24hrs), Av.1 °F (36.7 °C), Min:97.7 °F (36.5 °C), Max:99.1 °F (37.3 °C)      Intake/Output Summary (Last 24 hours) at 2020 1039  Last data filed at 2020 0608  Gross per 24 hour   Intake 420 ml   Output    Net 420 ml       Tele:   General:  Cooperative, Not in acute distress, speaks in full sentence while in bed  HEENT: PERRL, EOMI, supple neck, no JVD, dry oral mucosa  Cardiovascular: S1S2 regular, no rub/gallop   Pulmonary: Clear air entry bilaterally, + wheezing, no crackle  GI:  Soft, non tender, non distended, +bs, no guarding   Extremities:  No pedal edema, +distal pulses appreciated   Neuro: AOx3, moving all extremities, no gross deficit. Consults:     Significant Diagnostic Studies:   XR Results (most recent):  Results from Hospital Encounter encounter on 20   XR CHEST PORT    Narrative EXAM: XR CHEST PORT    INDICATION: 63 years Female. chest pain. ADDITIONAL HISTORY: None. TECHNIQUE: Frontal view of the chest.    COMPARISON: 2020 at 0730 hours    FINDINGS:    Artifact from patient's garment straps limits assessment of the mid chest.    The cardiac silhouette is at the upper limits of normal in size. Mild  cephalization of pulmonary vasculature. Tortuosity of the thoracic aorta. No confluent consolidation is appreciated. No evidence of pleural effusion or pneumothorax. There is no evidence of acute  osseous abnormality. Impression IMPRESSION:  1. Mild pulmonary vascular congestion. 2.  Borderline cardiac silhouette enlargement.            Lab/Data Review:  Labs: Results:       Chemistry Recent Labs     20  0233 20  2321   GLU 89 106*    139   K 3.7 5.4    106   CO2 31 28   BUN 26* 13   CREA 1.12 0.90   CA 8.4* 8.8   AGAP 6 5   BUCR 23* 14   AP  --  82   TP  --  7.0   ALB  --  3.2*   GLOB  --  3.8   AGRAT  --  0.8      CBC w/Diff Recent Labs     12/24/20  0233 12/22/20  2321   WBC 14.7* 10.9   RBC 4.48 4.35   HGB 12.8 12.8   HCT 40.5 38.6    327   GRANS 70 84*   LYMPH 23 12*   EOS 0 0      Coagulation No results for input(s): PTP, INR, APTT, INREXT in the last 72 hours. Iron/Ferritin No results for input(s): IRON in the last 72 hours. No lab exists for component: TIBCCALC   BNP No results for input(s): BNPP in the last 72 hours. Cardiac Enzymes No results for input(s): CPK, CKND1, JOSE in the last 72 hours. No lab exists for component: CKRMB, TROIP   Liver Enzymes Recent Labs     12/22/20  2321   TP 7.0   ALB 3.2*   AP 82      Thyroid Studies No results for input(s): T4, T3U, TSH, TSHEXT in the last 72 hours. No lab exists for component: T3RU       All Micro Results     None                Medications at discharge  including reasons for change and indications for new ones:   Current Discharge Medication List      CONTINUE these medications which have CHANGED    Details   fluticasone furoate-vilanteroL (Breo Ellipta) 200-25 mcg/dose inhaler Take 1 Puff by inhalation daily. Indications: controller medication for asthma  Qty: 1 Inhaler, Refills: 3      montelukast (SINGULAIR) 10 mg tablet Take 1 Tab by mouth nightly. Indications: controller medication for asthma  Qty: 30 Tab, Refills: 1      roflumilast (DALIRESP) 500 mcg tab tablet Take 1 Tab by mouth daily. Qty: 30 Tab, Refills: 3      albuterol (PROVENTIL HFA, VENTOLIN HFA, PROAIR HFA) 90 mcg/actuation inhaler Take 2 Puffs by inhalation every four (4) hours as needed for Wheezing or Shortness of Breath. Indications: bronchospasm prevention  Qty: 1 Inhaler, Refills: 4      albuterol (ACCUNEB) 1.25 mg/3 mL nebu Take 3 mL by inhalation every four (4) hours as needed for Wheezing (wheezing). Indications: bronchospasm prevention  Qty: 25 Each, Refills: 5      predniSONE (DELTASONE) 20 mg tablet Take 40 mg by mouth daily for 8 days.  Indications: worsening asthma  Qty: 16 Tab, Refills: 0         CONTINUE these medications which have NOT CHANGED    Details   citalopram (CeleXA) 20 mg tablet Take 1 Tab by mouth every morning. Qty: 30 Tab, Refills: 0      cloNIDine HCL (CATAPRES) 0.2 mg tablet Take 0.2 mg by mouth three (3) times daily. lisinopriL (PRINIVIL, ZESTRIL) 20 mg tablet Take 20 mg by mouth daily. amLODIPine (NORVASC) 10 mg tablet Take 10 mg by mouth daily. aspirin delayed-release 81 mg tablet Take 1 Tab by mouth daily. Qty: 30 Tab, Refills: 0      famotidine (PEPCID) 20 mg tablet Take 1 Tab by mouth daily. Qty: 30 Tab, Refills: 1      OXYGEN-AIR DELIVERY SYSTEMS 3 L by IntraNASal route as needed for Other (sob). atorvastatin (LIPITOR) 20 mg tablet Take 1 Tab by mouth nightly. Qty: 30 Tab, Refills: 0      nitroglycerin (NITROSTAT) 0.4 mg SL tablet Take 1 Tab by mouth every five (5) minutes as needed for Chest Pain. Sit/Lay down then put one tab under the tongue every 5 minutes as needed for chest pain for 3 doses  Qty: 1 Bottle, Refills: 0      naloxone (NARCAN) 4 mg/actuation nasal spray Use 1 spray intranasally, then discard. Repeat with new spray every 2 min as needed for opioid overdose symptoms, alternating nostrils.   Qty: 1 Each, Refills: 0                     Pending laboratory work and tests: none    Activity: Activity as tolerated    Diet: Cardiac Diet and Low fat, Low cholesterol    Wound Care: None needed        Susana Echeverria MD  12/25/2020  10:39 AM

## 2020-12-25 NOTE — PROGRESS NOTES
conducted a Follow up consultation and Spiritual Assessment for Margie Saldivar, who is a 63 y.o.,female. The  provided the following Interventions:  Visited Ms. Anderson during my daily rounds. Continued the relationship of care and support. Listened empathically as patient shared her concerns. Offered prayer and assurance of continued prayer on patients behalf. Chart reviewed. The following outcomes were achieved:  Patient expressed gratitude for 's visit. Plan:  Chaplains will continue to follow and will provide pastoral care on an as needed/requested basis.  recommends bedside caregivers page  on duty if patient shows signs of acute spiritual or emotional distress.        [address] [place] [address]   [phone]

## 2020-12-25 NOTE — DISCHARGE INSTRUCTIONS
Discharge Instructions    Patient: Kelsie Tanner MRN: [id number]  CSN: [id number]    YOB: 1965  Age: 63 y.o. Sex: female    DOA: 12/22/2020 LOS:  LOS: 2 days   Discharge Date:      ACUTE DIAGNOSES:  1. COPD/asthma exacerbation, improved   2. Chronic hypoxic respiratory failure   3. Tobacco dependence, though she said she quit smooking   4. No evidence of heart failure    5. Hypertension    6. Dyslipidemia   7. H/o prolonged heroin abuse         DISCHARGE MEDICATIONS:         · It is important that you take the medication exactly as they are prescribed. · Keep your medication in the bottles provided by the pharmacist and keep a list of the medication names, dosages, and times to be taken in your wallet. · Do not take other medications without consulting your doctor. DIET:  Cardiac Diet and Low fat, Low cholesterol    ACTIVITY: Activity as tolerated  Please stop smoking tobacco     ADDITIONAL INFORMATION: If you experience any of the following symptoms then please call your primary care physician or return to the emergency room if you cannot get hold of your doctor: Fever, chills, nausea, vomiting, diarrhea, change in mentation, falling, bleeding, shortness of breath. FOLLOW UP CARE:  Dr. Nate Mcginnis, Yo Leal MD  you are to call and set up an appointment to see them in 1-2 weeks. Information obtained by :  I understand that if any problems occur once I am at home I am to contact my physician. I understand and acknowledge receipt of the instructions indicated above.                                                                                                                                            Physician's or R.N.'s Signature                                                                  Date/Time                                                                                                                                              Patient or Representative Signature                                                          Date/Time    Luis Mendieta MD  12/25/2020  10:35 AM

## 2020-12-25 NOTE — PROGRESS NOTES
Discharge order noted for today. Pt has been accepted to [facility] agency. Transport has been arranged through pts family. Patient's discharge summary and home health  orders have been forwarded to [facility] home health  agency via [facility] [address]. Updated bedside RN, Jessica Andrew,  to the transition plan.   Discharge information has been documented on the AVS.       Verner Berry RN - Outcomes Manager  [phone] Patient complains of continued pain, Jim Boykin notified.     Rivas Tracey RN  11/03/17 0037

## 2020-12-28 ENCOUNTER — HOME CARE VISIT (OUTPATIENT)
Dept: HOME HEALTH SERVICES | Facility: HOME HEALTH | Age: 55
End: 2020-12-28

## 2020-12-28 ENCOUNTER — HOSPITAL ENCOUNTER (EMERGENCY)
Age: 55
Discharge: HOME | End: 2020-12-28
Attending: EMERGENCY MEDICINE
Payer: MEDICAID

## 2020-12-28 ENCOUNTER — APPOINTMENT (OUTPATIENT)
Dept: GENERAL RADIOLOGY | Age: 55
End: 2020-12-28
Attending: EMERGENCY MEDICINE
Payer: MEDICAID

## 2020-12-28 VITALS
RESPIRATION RATE: 16 BRPM | DIASTOLIC BLOOD PRESSURE: 88 MMHG | HEIGHT: 59 IN | OXYGEN SATURATION: 100 % | BODY MASS INDEX: 31.04 KG/M2 | SYSTOLIC BLOOD PRESSURE: 213 MMHG | WEIGHT: 154 LBS | HEART RATE: 80 BPM | TEMPERATURE: 98.3 F

## 2020-12-28 DIAGNOSIS — J44.1 ACUTE EXACERBATION OF CHRONIC OBSTRUCTIVE PULMONARY DISEASE (COPD) (HCC): Primary | ICD-10-CM

## 2020-12-28 DIAGNOSIS — R07.89 ATYPICAL CHEST PAIN: ICD-10-CM

## 2020-12-28 LAB
ALBUMIN SERPL-MCNC: 3.3 G/DL (ref 3.4–5)
ALBUMIN/GLOB SERPL: 0.9 {RATIO} (ref 0.8–1.7)
ALP SERPL-CCNC: 83 U/L (ref 45–117)
ALT SERPL-CCNC: 29 U/L (ref 13–56)
ANION GAP SERPL CALC-SCNC: 4 MMOL/L (ref 3–18)
AST SERPL-CCNC: 21 U/L (ref 10–38)
ATRIAL RATE: 84 BPM
BASOPHILS # BLD: 0 K/UL (ref 0–0.1)
BASOPHILS NFR BLD: 0 % (ref 0–2)
BILIRUB SERPL-MCNC: 0.4 MG/DL (ref 0.2–1)
BNP SERPL-MCNC: 3649 PG/ML (ref 0–900)
BUN SERPL-MCNC: 19 MG/DL (ref 7–18)
BUN/CREAT SERPL: 16 (ref 12–20)
CALCIUM SERPL-MCNC: 8.9 MG/DL (ref 8.5–10.1)
CALCULATED P AXIS, ECG09: 72 DEGREES
CALCULATED R AXIS, ECG10: 38 DEGREES
CALCULATED T AXIS, ECG11: 73 DEGREES
CHLORIDE SERPL-SCNC: 103 MMOL/L (ref 100–111)
CK MB CFR SERPL CALC: 4 % (ref 0–4)
CK MB CFR SERPL CALC: 4.6 % (ref 0–4)
CK MB SERPL-MCNC: 4.5 NG/ML (ref 5–25)
CK MB SERPL-MCNC: 4.7 NG/ML (ref 5–25)
CK SERPL-CCNC: 103 U/L (ref 26–192)
CK SERPL-CCNC: 113 U/L (ref 26–192)
CO2 SERPL-SCNC: 34 MMOL/L (ref 21–32)
CREAT SERPL-MCNC: 1.19 MG/DL (ref 0.6–1.3)
DIAGNOSIS, 93000: NORMAL
DIFFERENTIAL METHOD BLD: ABNORMAL
EOSINOPHIL # BLD: 0.2 K/UL (ref 0–0.4)
EOSINOPHIL NFR BLD: 2 % (ref 0–5)
ERYTHROCYTE [DISTWIDTH] IN BLOOD BY AUTOMATED COUNT: 12.9 % (ref 11.6–14.5)
GLOBULIN SER CALC-MCNC: 3.7 G/DL (ref 2–4)
GLUCOSE SERPL-MCNC: 86 MG/DL (ref 74–99)
HCT VFR BLD AUTO: 39.9 % (ref 35–45)
HGB BLD-MCNC: 12.8 G/DL (ref 12–16)
LYMPHOCYTES # BLD: 2.5 K/UL (ref 0.9–3.6)
LYMPHOCYTES NFR BLD: 21 % (ref 21–52)
MCH RBC QN AUTO: 29.1 PG (ref 24–34)
MCHC RBC AUTO-ENTMCNC: 32.1 G/DL (ref 31–37)
MCV RBC AUTO: 90.7 FL (ref 74–97)
MONOCYTES # BLD: 0.6 K/UL (ref 0.05–1.2)
MONOCYTES NFR BLD: 5 % (ref 3–10)
NEUTS SEG # BLD: 8.8 K/UL (ref 1.8–8)
NEUTS SEG NFR BLD: 72 % (ref 40–73)
P-R INTERVAL, ECG05: 120 MS
PLATELET # BLD AUTO: 270 K/UL (ref 135–420)
PMV BLD AUTO: 10 FL (ref 9.2–11.8)
POTASSIUM SERPL-SCNC: 3.9 MMOL/L (ref 3.5–5.5)
PROT SERPL-MCNC: 7 G/DL (ref 6.4–8.2)
Q-T INTERVAL, ECG07: 408 MS
QRS DURATION, ECG06: 82 MS
QTC CALCULATION (BEZET), ECG08: 482 MS
RBC # BLD AUTO: 4.4 M/UL (ref 4.2–5.3)
SODIUM SERPL-SCNC: 141 MMOL/L (ref 136–145)
TROPONIN I SERPL-MCNC: 0.05 NG/ML (ref 0–0.04)
TROPONIN I SERPL-MCNC: 0.05 NG/ML (ref 0–0.04)
VENTRICULAR RATE, ECG03: 84 BPM
WBC # BLD AUTO: 12.1 K/UL (ref 4.6–13.2)

## 2020-12-28 PROCEDURE — 99285 EMERGENCY DEPT VISIT HI MDM: CPT

## 2020-12-28 PROCEDURE — 74011000250 HC RX REV CODE- 250: Performed by: EMERGENCY MEDICINE

## 2020-12-28 PROCEDURE — 93005 ELECTROCARDIOGRAM TRACING: CPT

## 2020-12-28 PROCEDURE — 71045 X-RAY EXAM CHEST 1 VIEW: CPT

## 2020-12-28 PROCEDURE — 83880 ASSAY OF NATRIURETIC PEPTIDE: CPT

## 2020-12-28 PROCEDURE — 80053 COMPREHEN METABOLIC PANEL: CPT

## 2020-12-28 PROCEDURE — 74011636637 HC RX REV CODE- 636/637: Performed by: EMERGENCY MEDICINE

## 2020-12-28 PROCEDURE — 96365 THER/PROPH/DIAG IV INF INIT: CPT

## 2020-12-28 PROCEDURE — 74011250637 HC RX REV CODE- 250/637: Performed by: EMERGENCY MEDICINE

## 2020-12-28 PROCEDURE — 82550 ASSAY OF CK (CPK): CPT

## 2020-12-28 PROCEDURE — 85025 COMPLETE CBC W/AUTO DIFF WBC: CPT

## 2020-12-28 PROCEDURE — 94640 AIRWAY INHALATION TREATMENT: CPT

## 2020-12-28 PROCEDURE — 96366 THER/PROPH/DIAG IV INF ADDON: CPT

## 2020-12-28 PROCEDURE — 74011250636 HC RX REV CODE- 250/636: Performed by: EMERGENCY MEDICINE

## 2020-12-28 RX ORDER — IPRATROPIUM BROMIDE AND ALBUTEROL SULFATE 2.5; .5 MG/3ML; MG/3ML
3 SOLUTION RESPIRATORY (INHALATION)
Status: COMPLETED | OUTPATIENT
Start: 2020-12-28 | End: 2020-12-28

## 2020-12-28 RX ORDER — LISINOPRIL 20 MG/1
20 TABLET ORAL DAILY
Qty: 30 TAB | Refills: 0 | Status: SHIPPED | OUTPATIENT
Start: 2020-12-28 | End: 2020-12-30 | Stop reason: SDUPTHER

## 2020-12-28 RX ORDER — GUAIFENESIN 100 MG/5ML
324 LIQUID (ML) ORAL
Status: COMPLETED | OUTPATIENT
Start: 2020-12-28 | End: 2020-12-28

## 2020-12-28 RX ORDER — CLONIDINE HYDROCHLORIDE 0.1 MG/1
0.2 TABLET ORAL
Status: COMPLETED | OUTPATIENT
Start: 2020-12-28 | End: 2020-12-28

## 2020-12-28 RX ORDER — CLONIDINE HYDROCHLORIDE 0.2 MG/1
0.2 TABLET ORAL 3 TIMES DAILY
Qty: 90 TAB | Refills: 0 | Status: SHIPPED | OUTPATIENT
Start: 2020-12-28 | End: 2021-01-27

## 2020-12-28 RX ORDER — PREDNISONE 20 MG/1
60 TABLET ORAL
Status: COMPLETED | OUTPATIENT
Start: 2020-12-28 | End: 2020-12-28

## 2020-12-28 RX ORDER — MAGNESIUM SULFATE HEPTAHYDRATE 40 MG/ML
2 INJECTION, SOLUTION INTRAVENOUS
Status: COMPLETED | OUTPATIENT
Start: 2020-12-28 | End: 2020-12-28

## 2020-12-28 RX ADMIN — CLONIDINE HYDROCHLORIDE 0.2 MG: 0.1 TABLET ORAL at 05:26

## 2020-12-28 RX ADMIN — IPRATROPIUM BROMIDE AND ALBUTEROL SULFATE 3 ML: .5; 3 SOLUTION RESPIRATORY (INHALATION) at 05:26

## 2020-12-28 RX ADMIN — PREDNISONE 60 MG: 20 TABLET ORAL at 05:26

## 2020-12-28 RX ADMIN — ASPIRIN 81 MG CHEWABLE TABLET 324 MG: 81 TABLET CHEWABLE at 05:26

## 2020-12-28 RX ADMIN — MAGNESIUM SULFATE HEPTAHYDRATE 2 G: 40 INJECTION, SOLUTION INTRAVENOUS at 05:26

## 2020-12-28 NOTE — ED NOTES
SB[facility] bedside report given to [facility], no questions or concerns at this time.

## 2020-12-28 NOTE — ED TRIAGE NOTES
Pt arrived via EMS c/o shortness of breath, states she used 4 albuterol treatments at home without relief. Pt currently on 3L [place] home O2, saturation 99% pt does not appear to be in distress at this time.

## 2020-12-28 NOTE — ED PROVIDER NOTES
Maria Guadalupe Blackman is a 63 y.o. female who is very well-known here to the emergency department with a history of hypertension, CHF, hyperlipidemia, chronic chest pain, and COPD on home O2 coming in complaining of chest pain or shortness of breath. Patient states that she was just discharged from the hospital on [address]. She states that she was sent home with a prescription for prednisone but was unable to  from the pharmacy. Patient states that she used 4-5 breathing treatments tonight prior to arrival, but does not help her symptoms and also she decided to come in for evaluation. Patient also reports intermittent left-sided sharp chest pain that she has had for the last couple days. Patient also reports some mild lower extremity edema. Reports chronic cough. Denies fevers or chills. No other complaints at this time.            Past Medical History:   Diagnosis Date    CHF (congestive heart failure) (HCC)     Chronic respiratory failure with hypoxia ([address] 9/7/2020    CKD (chronic kidney disease) stage 3, GFR 30-59 ml/min 10/31/2019    Cocaine abuse ([address] 11/26/2017    COPD (chronic obstructive pulmonary disease) with chronic bronchitis ([address] 12/19/2017    Dependence on supplemental oxygen     Depression 3/22/2020    Drug-seeking behavior 11/19/2016    Endocrine disease     thyroid issues    Essential hypertension 3/10/2017    Fe deficiency anemia 10/27/2012    Fibromyalgia 12/16/2016    Gastroesophageal reflux disease without esophagitis 10/10/2016    Gastrointestinal disorder     \"blockage in my stomach\"    Hypercholesterolemia     Hypertension     Hypertensive heart failure ([address] 12/19/2017    Morbid obesity due to excess calories ([address] 3/10/2017    NSTEMI (non-ST elevated myocardial infarction) ([address] 3/22/2020    On home O2 12/3/2015    Overview:  2L at home per pt for approx 8 years    Polysubstance abuse ([address] 9/15/2018    Respiratory failure ([address] 1/11/2017    S/P hernia repair 10/31/2019    Sleep apnea 12/19/2017    T wave inversion in EKG 3/9/2019    Tobacco use 2/06/8646    Uncomplicated severe persistent asthma 12/13/2016    Vocal cord disease 12/7/2016       Past Surgical History:   Procedure Laterality Date    HX GI      Exploratory laparotomy with lysis of adhesions and primary repair of incarcerated umbilical hernia         History reviewed. No pertinent family history.     Social History     Socioeconomic History    Marital status: SINGLE     Spouse name: Not on file    Number of children: Not on file    Years of education: Not on file    Highest education level: Not on file   Occupational History    Not on file   Social Needs    Financial resource strain: Not on file    Food insecurity     Worry: Not on file     Inability: Not on file    Transportation needs     Medical: Not on file     Non-medical: Not on file   Tobacco Use    Smoking status: Former Smoker     Packs/day: 0.25    Smokeless tobacco: Current User    Tobacco comment: Pt states she has not had money to buy cigarettes in the past month   Substance and Sexual Activity    Alcohol use: No     Comment: socially    Drug use: Not Currently     Types: Heroin     Comment: pt reports using approximately a month ago    Sexual activity: Not Currently     Comment: last used 9 years ago   Lifestyle    Physical activity     Days per week: Not on file     Minutes per session: Not on file    Stress: Not on file   Relationships    Social connections     Talks on phone: Not on file     Gets together: Not on file     Attends Sikh service: Not on file     Active member of club or organization: Not on file     Attends meetings of clubs or organizations: Not on file     Relationship status: Not on file    Intimate partner violence     Fear of current or ex partner: Not on file     Emotionally abused: Not on file     Physically abused: Not on file     Forced sexual activity: Not on file   Other Topics Concern    Not on file   Social History Narrative    Not on file         ALLERGIES: Patient has no known allergies. Review of Systems   Constitutional: Negative. Negative for chills and fever. HENT: Negative. Negative for sore throat. Respiratory: Positive for cough, shortness of breath and wheezing. Cardiovascular: Positive for chest pain and leg swelling. Gastrointestinal: Negative. Negative for abdominal pain, nausea and vomiting. Genitourinary: Negative. Negative for dysuria. Musculoskeletal: Negative. Negative for myalgias. Skin: Negative. Negative for rash. Neurological: Negative. Negative for dizziness, weakness and light-headedness. All other systems reviewed and are negative. Vitals:    12/28/20 0322   BP: (!) 213/88   Pulse: 80   Resp: 16   Temp: 98.3 °F (36.8 °C)   SpO2: 100%   Weight: 69.9 kg (154 lb)   Height: 4' 11\" (1.499 m)            Physical Exam  Vitals signs reviewed. Constitutional:       General: She is not in acute distress. Appearance: Normal appearance. She is well-developed. HENT:      Head: Normocephalic and atraumatic. Mouth/Throat:      Mouth: Mucous membranes are dry. Eyes:      Extraocular Movements: Extraocular movements intact. Conjunctiva/sclera: Conjunctivae normal.      Pupils: Pupils are equal, round, and reactive to light. Neck:      Musculoskeletal: Normal range of motion and neck supple. Cardiovascular:      Rate and Rhythm: Normal rate and regular rhythm. Heart sounds: S1 normal and S2 normal. No murmur. No friction rub. No gallop. Pulmonary:      Effort: No respiratory distress. Comments: Mildly tachypneic. Patient has some expiratory wheezing in all lung fields with a prolonged expiratory phase. She is speaking in full sentences and in no distress. Ambulates without any difficulty or increased work of breathing. Abdominal:      General: There is no distension. Tenderness:  There is no abdominal tenderness. Musculoskeletal: Normal range of motion. General: No tenderness. Comments: Trace pitting edema in the lower extremities bilaterally. Skin:     General: Skin is warm. Findings: No rash. Neurological:      General: No focal deficit present. Mental Status: She is alert and oriented to person, place, and time. Psychiatric:         Speech: Speech normal.          MDM  Number of Diagnoses or Management Options  Acute exacerbation of chronic obstructive pulmonary disease (COPD) ([address]  Atypical chest pain  Diagnosis management comments: Jennie Rivera is a 63 y.o. female coming in complaining of chest pain shortness of breath. Patient has some evidence of obstructive lung disease consistent with her baseline. She actually appears to be doing relatively well. No evidence of severe distress. Good O2 sats. Normal heart rate. Low suspicion of pulmonary embolism or other secondary etiology of her symptoms. Will get work-up to rule out ACS. Will get chest x-ray to rule out large infiltrate. She is afebrile without other infectious symptoms I have low suspicion of Covid-19 or other infectious pulmonary process.          Procedures      Vitals:  Patient Vitals for the past 12 hrs:   Temp Pulse Resp BP SpO2   12/28/20 0322 98.3 °F (36.8 °C) 80 16 (!) 213/88 100 %       Medications ordered:   Medications   magnesium sulfate 2 g/50 ml IVPB (premix or compounded) (2 g IntraVENous New Bag 12/28/20 0526)   cloNIDine HCL (CATAPRES) tablet 0.2 mg (0.2 mg Oral Given 12/28/20 0526)   predniSONE (DELTASONE) tablet 60 mg (60 mg Oral Given 12/28/20 0526)   albuterol-ipratropium (DUO-NEB) 2.5 MG-0.5 MG/3 ML (3 mL Nebulization Given 12/28/20 0526)   aspirin chewable tablet 324 mg (324 mg Oral Given 12/28/20 0526)         Lab findings:  Recent Results (from the past 12 hour(s))   CBC WITH AUTOMATED DIFF    Collection Time: 12/28/20  4:14 AM   Result Value Ref Range    WBC 12.1 4.6 - 13.2 K/uL    RBC 4.40 4.20 - 5.30 M/uL    HGB 12.8 12.0 - 16.0 g/dL    HCT 39.9 35.0 - 45.0 %    MCV 90.7 74.0 - 97.0 FL    MCH 29.1 24.0 - 34.0 PG    MCHC 32.1 31.0 - 37.0 g/dL    RDW 12.9 11.6 - 14.5 %    PLATELET 361 363 - 879 K/uL    MPV 10.0 9.2 - 11.8 FL    NEUTROPHILS 72 40 - 73 %    LYMPHOCYTES 21 21 - 52 %    MONOCYTES 5 3 - 10 %    EOSINOPHILS 2 0 - 5 %    BASOPHILS 0 0 - 2 %    ABS. NEUTROPHILS 8.8 (H) 1.8 - 8.0 K/UL    ABS. LYMPHOCYTES 2.5 0.9 - 3.6 K/UL    ABS. MONOCYTES 0.6 0.05 - 1.2 K/UL    ABS. EOSINOPHILS 0.2 0.0 - 0.4 K/UL    ABS. BASOPHILS 0.0 0.0 - 0.1 K/UL    DF AUTOMATED     METABOLIC PANEL, COMPREHENSIVE    Collection Time: 12/28/20  4:14 AM   Result Value Ref Range    Sodium 141 136 - 145 mmol/L    Potassium 3.9 3.5 - 5.5 mmol/L    Chloride 103 100 - 111 mmol/L    CO2 34 (H) 21 - 32 mmol/L    Anion gap 4 3.0 - 18 mmol/L    Glucose 86 74 - 99 mg/dL    BUN 19 (H) 7.0 - 18 MG/DL    Creatinine 1.19 0.6 - 1.3 MG/DL    BUN/Creatinine ratio 16 12 - 20      GFR est AA 57 (L) >60 ml/min/1.73m2    GFR est non-AA 47 (L) >60 ml/min/1.73m2    Calcium 8.9 8.5 - 10.1 MG/DL    Bilirubin, total 0.4 0.2 - 1.0 MG/DL    ALT (SGPT) 29 13 - 56 U/L    AST (SGOT) 21 10 - 38 U/L    Alk.  phosphatase 83 45 - 117 U/L    Protein, total 7.0 6.4 - 8.2 g/dL    Albumin 3.3 (L) 3.4 - 5.0 g/dL    Globulin 3.7 2.0 - 4.0 g/dL    A-G Ratio 0.9 0.8 - 1.7     CARDIAC PANEL,(CK, CKMB & TROPONIN)    Collection Time: 12/28/20  4:14 AM   Result Value Ref Range    CK - MB 4.5 (H) <3.6 ng/ml    CK-MB Index 4.0 0.0 - 4.0 %     26 - 192 U/L    Troponin-I, QT 0.05 (H) 0.0 - 0.045 NG/ML   NT-PRO BNP    Collection Time: 12/28/20  4:14 AM   Result Value Ref Range    NT pro-BNP 3,649 (H) 0 - 900 PG/ML   EKG, 12 LEAD, INITIAL    Collection Time: 12/28/20  4:30 AM   Result Value Ref Range    Ventricular Rate 84 BPM    Atrial Rate 84 BPM    P-R Interval 120 ms    QRS Duration 82 ms    Q-T Interval 408 ms    QTC Calculation (Bezet) 482 ms Calculated P Axis 72 degrees    Calculated R Axis 38 degrees    Calculated T Axis 73 degrees    Diagnosis       Normal sinus rhythm  Right atrial enlargement  Minimal voltage criteria for LVH, may be normal variant  Anterior infarct , age undetermined  Abnormal ECG  When compared with ECG of 22-DEC-2020 22:22,  Nonspecific T wave abnormality now evident in Lateral leads         EKG interpretation by ED Physician: Sinus rhythm rate of 84 bpm.  No acute ischemic changes or evidence of acute right heart strain. X-Ray, CT or other radiology findings or impressions:  XR CHEST PORT    (Results Pending)       Progress notes, Consult notes or additional Procedure notes:     Reevaluation of patient:   Patient care transferred incoming physician pending second set of cardiac enzymes and disposition. Disposition:  Diagnosis:   1. Acute exacerbation of chronic obstructive pulmonary disease (COPD) ([facility]    2. Atypical chest pain        Disposition: Pending    Follow-up Information     Follow up With Specialties Details Why Contact Info    Sapna Nunes MD Internal Medicine Schedule an appointment as soon as possible for a visit  for office follow up [address] [place] [address] [phone]             Patient's Medications   Start Taking    No medications on file   Continue Taking    ALBUTEROL (ACCUNEB) 1.25 MG/3 ML NEBU    Take 3 mL by inhalation every four (4) hours as needed for Wheezing (wheezing). Indications: bronchospasm prevention    ALBUTEROL (PROVENTIL HFA, VENTOLIN HFA, PROAIR HFA) 90 MCG/ACTUATION INHALER    Take 2 Puffs by inhalation every four (4) hours as needed for Wheezing or Shortness of Breath. Indications: bronchospasm prevention    AMLODIPINE (NORVASC) 10 MG TABLET    Take 10 mg by mouth daily. ASPIRIN DELAYED-RELEASE 81 MG TABLET    Take 1 Tab by mouth daily. ATORVASTATIN (LIPITOR) 20 MG TABLET    Take 1 Tab by mouth nightly.     CITALOPRAM (CELEXA) 20 MG TABLET    Take 1 Tab by mouth every morning. CLONIDINE HCL (CATAPRES) 0.2 MG TABLET    Take 0.2 mg by mouth three (3) times daily. FAMOTIDINE (PEPCID) 20 MG TABLET    Take 1 Tab by mouth daily. FLUTICASONE FUROATE-VILANTEROL (BREO ELLIPTA) 200-25 MCG/DOSE INHALER    Take 1 Puff by inhalation daily. Indications: controller medication for asthma    LISINOPRIL (PRINIVIL, ZESTRIL) 20 MG TABLET    Take 20 mg by mouth daily. MONTELUKAST (SINGULAIR) 10 MG TABLET    Take 1 Tab by mouth nightly. Indications: controller medication for asthma    NALOXONE (NARCAN) 4 MG/ACTUATION NASAL SPRAY    Use 1 spray intranasally, then discard. Repeat with new spray every 2 min as needed for opioid overdose symptoms, alternating nostrils. NITROGLYCERIN (NITROSTAT) 0.4 MG SL TABLET    Take 1 Tab by mouth every five (5) minutes as needed for Chest Pain. Sit/Lay down then put one tab under the tongue every 5 minutes as needed for chest pain for 3 doses    OXYGEN-AIR DELIVERY SYSTEMS    3 L by IntraNASal route as needed for Other (sob). PREDNISONE (DELTASONE) 20 MG TABLET    Take 40 mg by mouth daily for 8 days. Indications: worsening asthma    ROFLUMILAST (DALIRESP) 500 MCG TAB TABLET    Take 1 Tab by mouth daily.    These Medications have changed    No medications on file   Stop Taking    No medications on file

## 2020-12-28 NOTE — ED NOTES
07:00 AM   Assumed care by Dr. Sofie Cabral pending repeat troponin  Repeat troponin unchanged 0.05. Patient does not have any chest pain. Wants to return home. Feels better on reassessment.   Patient advised to follow-up with her PMD.  States she has a steroid prescription in the pharmacy that she has not picked up has albuterol at home

## 2020-12-29 ENCOUNTER — HOME CARE VISIT (OUTPATIENT)
Dept: HOME HEALTH SERVICES | Facility: HOME HEALTH | Age: 55
End: 2020-12-29

## 2020-12-30 ENCOUNTER — APPOINTMENT (OUTPATIENT)
Dept: GENERAL RADIOLOGY | Age: 55
End: 2020-12-30
Attending: SPECIALIST/TECHNOLOGIST
Payer: MEDICAID

## 2020-12-30 ENCOUNTER — HOSPITAL ENCOUNTER (EMERGENCY)
Age: 55
Discharge: HOME | End: 2020-12-30
Attending: EMERGENCY MEDICINE
Payer: MEDICAID

## 2020-12-30 VITALS
WEIGHT: 154 LBS | HEIGHT: 59 IN | HEART RATE: 79 BPM | TEMPERATURE: 98.6 F | BODY MASS INDEX: 31.04 KG/M2 | OXYGEN SATURATION: 100 % | RESPIRATION RATE: 18 BRPM | SYSTOLIC BLOOD PRESSURE: 116 MMHG | DIASTOLIC BLOOD PRESSURE: 44 MMHG

## 2020-12-30 DIAGNOSIS — R07.9 CHEST PAIN, UNSPECIFIED TYPE: Primary | ICD-10-CM

## 2020-12-30 LAB
ALBUMIN SERPL-MCNC: 3.1 G/DL (ref 3.4–5)
ALBUMIN/GLOB SERPL: 0.9 {RATIO} (ref 0.8–1.7)
ALP SERPL-CCNC: 70 U/L (ref 45–117)
ALT SERPL-CCNC: 24 U/L (ref 13–56)
ANION GAP SERPL CALC-SCNC: 3 MMOL/L (ref 3–18)
AST SERPL-CCNC: 10 U/L (ref 10–38)
ATRIAL RATE: 72 BPM
BASOPHILS # BLD: 0 K/UL (ref 0–0.1)
BASOPHILS NFR BLD: 0 % (ref 0–2)
BILIRUB DIRECT SERPL-MCNC: <0.1 MG/DL (ref 0–0.2)
BILIRUB SERPL-MCNC: 0.2 MG/DL (ref 0.2–1)
BUN SERPL-MCNC: 20 MG/DL (ref 7–18)
BUN/CREAT SERPL: 18 (ref 12–20)
CALCIUM SERPL-MCNC: 8.5 MG/DL (ref 8.5–10.1)
CALCULATED P AXIS, ECG09: 60 DEGREES
CALCULATED R AXIS, ECG10: 39 DEGREES
CALCULATED T AXIS, ECG11: 68 DEGREES
CHLORIDE SERPL-SCNC: 109 MMOL/L (ref 100–111)
CK MB CFR SERPL CALC: 5.6 % (ref 0–4)
CK MB CFR SERPL CALC: 6 % (ref 0–4)
CK MB SERPL-MCNC: 2.8 NG/ML (ref 5–25)
CK MB SERPL-MCNC: 3.3 NG/ML (ref 5–25)
CK SERPL-CCNC: 50 U/L (ref 26–192)
CK SERPL-CCNC: 55 U/L (ref 26–192)
CO2 SERPL-SCNC: 31 MMOL/L (ref 21–32)
CREAT SERPL-MCNC: 1.1 MG/DL (ref 0.6–1.3)
DIAGNOSIS, 93000: NORMAL
DIFFERENTIAL METHOD BLD: ABNORMAL
EOSINOPHIL # BLD: 0.1 K/UL (ref 0–0.4)
EOSINOPHIL NFR BLD: 2 % (ref 0–5)
ERYTHROCYTE [DISTWIDTH] IN BLOOD BY AUTOMATED COUNT: 12.8 % (ref 11.6–14.5)
GLOBULIN SER CALC-MCNC: 3.6 G/DL (ref 2–4)
GLUCOSE SERPL-MCNC: 90 MG/DL (ref 74–99)
HCT VFR BLD AUTO: 39.2 % (ref 35–45)
HGB BLD-MCNC: 12.2 G/DL (ref 12–16)
INR PPP: 1 (ref 0.8–1.2)
LYMPHOCYTES # BLD: 2.5 K/UL (ref 0.9–3.6)
LYMPHOCYTES NFR BLD: 30 % (ref 21–52)
MCH RBC QN AUTO: 28.8 PG (ref 24–34)
MCHC RBC AUTO-ENTMCNC: 31.1 G/DL (ref 31–37)
MCV RBC AUTO: 92.5 FL (ref 74–97)
MONOCYTES # BLD: 0 K/UL (ref 0.05–1.2)
MONOCYTES NFR BLD: 1 % (ref 3–10)
NEUTS SEG # BLD: 5.7 K/UL (ref 1.8–8)
NEUTS SEG NFR BLD: 67 % (ref 40–73)
P-R INTERVAL, ECG05: 130 MS
PLATELET # BLD AUTO: 221 K/UL (ref 135–420)
PMV BLD AUTO: 10.1 FL (ref 9.2–11.8)
POTASSIUM SERPL-SCNC: 4.2 MMOL/L (ref 3.5–5.5)
PROT SERPL-MCNC: 6.7 G/DL (ref 6.4–8.2)
PROTHROMBIN TIME: 13.3 SEC (ref 11.5–15.2)
Q-T INTERVAL, ECG07: 416 MS
QRS DURATION, ECG06: 76 MS
QTC CALCULATION (BEZET), ECG08: 455 MS
RBC # BLD AUTO: 4.24 M/UL (ref 4.2–5.3)
SODIUM SERPL-SCNC: 143 MMOL/L (ref 136–145)
TROPONIN I SERPL-MCNC: <0.02 NG/ML (ref 0–0.04)
TROPONIN I SERPL-MCNC: <0.02 NG/ML (ref 0–0.04)
VENTRICULAR RATE, ECG03: 72 BPM
WBC # BLD AUTO: 8.4 K/UL (ref 4.6–13.2)

## 2020-12-30 PROCEDURE — 71045 X-RAY EXAM CHEST 1 VIEW: CPT

## 2020-12-30 PROCEDURE — 80076 HEPATIC FUNCTION PANEL: CPT

## 2020-12-30 PROCEDURE — 94762 N-INVAS EAR/PLS OXIMTRY CONT: CPT

## 2020-12-30 PROCEDURE — 99285 EMERGENCY DEPT VISIT HI MDM: CPT

## 2020-12-30 PROCEDURE — 74011250637 HC RX REV CODE- 250/637: Performed by: EMERGENCY MEDICINE

## 2020-12-30 PROCEDURE — 93005 ELECTROCARDIOGRAM TRACING: CPT

## 2020-12-30 PROCEDURE — 80048 BASIC METABOLIC PNL TOTAL CA: CPT

## 2020-12-30 PROCEDURE — 85610 PROTHROMBIN TIME: CPT

## 2020-12-30 PROCEDURE — 82550 ASSAY OF CK (CPK): CPT

## 2020-12-30 PROCEDURE — 85025 COMPLETE CBC W/AUTO DIFF WBC: CPT

## 2020-12-30 RX ORDER — LISINOPRIL 20 MG/1
20 TABLET ORAL DAILY
Qty: 30 TAB | Refills: 0 | Status: SHIPPED | OUTPATIENT
Start: 2020-12-30 | End: 2021-01-29

## 2020-12-30 RX ORDER — AMLODIPINE BESYLATE 10 MG/1
10 TABLET ORAL DAILY
Qty: 30 TAB | Refills: 0 | Status: SHIPPED | OUTPATIENT
Start: 2020-12-30 | End: 2021-01-29

## 2020-12-30 RX ORDER — ASPIRIN 81 MG/1
81 TABLET ORAL DAILY
Qty: 30 TAB | Refills: 0 | Status: SHIPPED | OUTPATIENT
Start: 2020-12-30

## 2020-12-30 RX ORDER — CLONIDINE HYDROCHLORIDE 0.1 MG/1
0.2 TABLET ORAL
Status: COMPLETED | OUTPATIENT
Start: 2020-12-30 | End: 2020-12-30

## 2020-12-30 RX ADMIN — CLONIDINE HYDROCHLORIDE 0.2 MG: 0.1 TABLET ORAL at 13:31

## 2020-12-30 NOTE — ED PROVIDER NOTES
EMERGENCY DEPARTMENT HISTORY AND PHYSICAL EXAM  This was created with voice recognition software and transcription errors may be present. 8:59 AM  Date: 12/30/2020  Patient Name: Job Smith    History of Presenting Illness     Chief Complaint:    History Provided By:     HPI: Job Smith is a 63 y.o. female medical history of CHF COPD endocrine disease hypertension fibromyalgia high cholesterol coronary disease polysubstance abuse respiratory failure who presents with chest pain. Patient states she was at home and had sudden onset of unprovoked midsternal chest pressure no radiation describes as a tightness resolved after 20 minutes. Patient called EMS stated having a second time and again resolved without intervention. Patient has a history of 2 prior MIs stress test was approximately 2 years ago.   No nausea vomiting or diaphoresis with either of these events today no other aggravating or alleviating factors no other associated symptoms  PCP: Mabel Mohan MD      Past History     Past Medical History:  Past Medical History:   Diagnosis Date    CHF (congestive heart failure) ([address]     Chronic respiratory failure with hypoxia ([address] 9/7/2020    CKD (chronic kidney disease) stage 3, GFR 30-59 ml/min 10/31/2019    Cocaine abuse ([address] 11/26/2017    COPD (chronic obstructive pulmonary disease) with chronic bronchitis ([address] 12/19/2017    Dependence on supplemental oxygen     Depression 3/22/2020    Drug-seeking behavior 11/19/2016    Endocrine disease     thyroid issues    Essential hypertension 3/10/2017    Fe deficiency anemia 10/27/2012    Fibromyalgia 12/16/2016    Gastroesophageal reflux disease without esophagitis 10/10/2016    Gastrointestinal disorder     \"blockage in my stomach\"    Hypercholesterolemia     Hypertension     Hypertensive heart failure ([address] 12/19/2017    Morbid obesity due to excess calories ([address] 3/10/2017    NSTEMI (non-ST elevated myocardial infarction) ([facility] 3/22/2020    On home O2 12/3/2015    Overview:  2L at home per pt for approx 8 years    Polysubstance abuse ([facility] 9/15/2018    Respiratory failure ([facility] 1/11/2017    S/P hernia repair 10/31/2019    Sleep apnea 12/19/2017    T wave inversion in EKG 3/9/2019    Tobacco use 6/60/9738    Uncomplicated severe persistent asthma 12/13/2016    Vocal cord disease 12/7/2016       Past Surgical History:  Past Surgical History:   Procedure Laterality Date    HX GI      Exploratory laparotomy with lysis of adhesions and primary repair of incarcerated umbilical hernia       Family History:  No family history on file. Social History:  Social History     Tobacco Use    Smoking status: Former Smoker     Packs/day: 0.25    Smokeless tobacco: Current User    Tobacco comment: Pt states she has not had money to buy cigarettes in the past month   Substance Use Topics    Alcohol use: No     Comment: socially    Drug use: Not Currently     Types: Heroin     Comment: pt reports using approximately a month ago       Allergies:  No Known Allergies    Review of Systems     Review of Systems   All other systems reviewed and are negative. 10 point review of systems otherwise negative unless noted in HPI. Physical Exam       Physical Exam  Constitutional:       Appearance: She is well-developed. HENT:      Head: Normocephalic and atraumatic. Eyes:      Pupils: Pupils are equal, round, and reactive to light. Neck:      Musculoskeletal: Normal range of motion and neck supple. Cardiovascular:      Rate and Rhythm: Normal rate and regular rhythm. Heart sounds: Normal heart sounds. No murmur. No friction rub. Pulmonary:      Effort: Pulmonary effort is normal. No respiratory distress. Breath sounds: Normal breath sounds. No wheezing. Abdominal:      General: There is no distension. Palpations: Abdomen is soft. Tenderness: There is no abdominal tenderness.  There is no guarding or rebound. Musculoskeletal: Normal range of motion. Skin:     General: Skin is warm and dry. Neurological:      Mental Status: She is alert and oriented to person, place, and time. Psychiatric:         Behavior: Behavior normal.         Thought Content: Thought content normal.         Diagnostic Study Results     Vital Signs   Visit Vitals  BP (!) 196/83   Pulse 74   Temp 98.6 °F (37 °C)   Resp 20   Ht 4' 11\" (1.499 m)   Wt 69.9 kg (154 lb)   LMP 04/14/2009   SpO2 100%   BMI 31.10 kg/m²      EKG: EKG shows sinus rhythm with a rate of 72 with a normal axis and normal intervals there is no ST elevation or depression and no hypertrophy  Labs: CBC unremarkable chemistry unremarkable Trop negative x2  Imaging: X-rays negative    Medical Decision Making     ED Course: Progress Notes, Reevaluation, and Consults:      Provider Notes (Medical Decision Making): This is a 70-year-old female who presents with chest pain. She does have a history of coronary disease 2 episodes of each about 20 minutes without nausea vomiting or diaphoresis. Check troponin x2 if negative will likely need outpatient stress testing. Ms. Donna Pereira is very well-known to me from multiple prior visits         Diagnosis     Clinical Impression: No diagnosis found. Disposition:    Patient's Medications   Start Taking    No medications on file   Continue Taking    ALBUTEROL (ACCUNEB) 1.25 MG/3 ML NEBU    Take 3 mL by inhalation every four (4) hours as needed for Wheezing (wheezing). Indications: bronchospasm prevention    ALBUTEROL (PROVENTIL HFA, VENTOLIN HFA, PROAIR HFA) 90 MCG/ACTUATION INHALER    Take 2 Puffs by inhalation every four (4) hours as needed for Wheezing or Shortness of Breath. Indications: bronchospasm prevention    AMLODIPINE (NORVASC) 10 MG TABLET    Take 10 mg by mouth daily. ASPIRIN DELAYED-RELEASE 81 MG TABLET    Take 1 Tab by mouth daily. ATORVASTATIN (LIPITOR) 20 MG TABLET    Take 1 Tab by mouth nightly. CITALOPRAM (CELEXA) 20 MG TABLET    Take 1 Tab by mouth every morning. CLONIDINE HCL (CATAPRES) 0.2 MG TABLET    Take 0.2 mg by mouth three (3) times daily. CLONIDINE HCL (CATAPRES) 0.2 MG TABLET    Take 1 Tab by mouth three (3) times daily for 30 days. FAMOTIDINE (PEPCID) 20 MG TABLET    Take 1 Tab by mouth daily. FLUTICASONE FUROATE-VILANTEROL (BREO ELLIPTA) 200-25 MCG/DOSE INHALER    Take 1 Puff by inhalation daily. Indications: controller medication for asthma    LISINOPRIL (PRINIVIL) 20 MG TABLET    Take 1 Tab by mouth daily for 30 days. LISINOPRIL (PRINIVIL, ZESTRIL) 20 MG TABLET    Take 20 mg by mouth daily. MONTELUKAST (SINGULAIR) 10 MG TABLET    Take 1 Tab by mouth nightly. Indications: controller medication for asthma    NALOXONE (NARCAN) 4 MG/ACTUATION NASAL SPRAY    Use 1 spray intranasally, then discard. Repeat with new spray every 2 min as needed for opioid overdose symptoms, alternating nostrils. NITROGLYCERIN (NITROSTAT) 0.4 MG SL TABLET    Take 1 Tab by mouth every five (5) minutes as needed for Chest Pain. Sit/Lay down then put one tab under the tongue every 5 minutes as needed for chest pain for 3 doses    OXYGEN-AIR DELIVERY SYSTEMS    3 L by IntraNASal route as needed for Other (sob). PREDNISONE (DELTASONE) 20 MG TABLET    Take 40 mg by mouth daily for 8 days. Indications: worsening asthma    ROFLUMILAST (DALIRESP) 500 MCG TAB TABLET    Take 1 Tab by mouth daily.    These Medications have changed    No medications on file   Stop Taking    No medications on file

## 2020-12-31 ENCOUNTER — HOSPITAL ENCOUNTER (EMERGENCY)
Age: 55
Discharge: HOME | End: 2020-12-31
Attending: EMERGENCY MEDICINE
Payer: MEDICAID

## 2020-12-31 ENCOUNTER — APPOINTMENT (OUTPATIENT)
Dept: GENERAL RADIOLOGY | Age: 55
End: 2020-12-31
Attending: EMERGENCY MEDICINE
Payer: MEDICAID

## 2020-12-31 VITALS
TEMPERATURE: 98.3 F | DIASTOLIC BLOOD PRESSURE: 81 MMHG | OXYGEN SATURATION: 100 % | HEART RATE: 79 BPM | RESPIRATION RATE: 16 BRPM | SYSTOLIC BLOOD PRESSURE: 161 MMHG

## 2020-12-31 DIAGNOSIS — I50.9 ACUTE CONGESTIVE HEART FAILURE, UNSPECIFIED HEART FAILURE TYPE (HCC): ICD-10-CM

## 2020-12-31 DIAGNOSIS — J44.1 ACUTE EXACERBATION OF CHRONIC OBSTRUCTIVE PULMONARY DISEASE (COPD) (HCC): Primary | ICD-10-CM

## 2020-12-31 DIAGNOSIS — R06.02 SOB (SHORTNESS OF BREATH): ICD-10-CM

## 2020-12-31 LAB
ALBUMIN SERPL-MCNC: 3.1 G/DL (ref 3.4–5)
ALBUMIN/GLOB SERPL: 0.9 {RATIO} (ref 0.8–1.7)
ALP SERPL-CCNC: 73 U/L (ref 45–117)
ALT SERPL-CCNC: 22 U/L (ref 13–56)
ANION GAP SERPL CALC-SCNC: 2 MMOL/L (ref 3–18)
AST SERPL-CCNC: 14 U/L (ref 10–38)
ATRIAL RATE: 77 BPM
BASOPHILS # BLD: 0 K/UL (ref 0–0.1)
BASOPHILS NFR BLD: 0 % (ref 0–2)
BILIRUB SERPL-MCNC: 0.2 MG/DL (ref 0.2–1)
BNP SERPL-MCNC: 1001 PG/ML (ref 0–900)
BUN SERPL-MCNC: 17 MG/DL (ref 7–18)
BUN/CREAT SERPL: 16 (ref 12–20)
CALCIUM SERPL-MCNC: 8.4 MG/DL (ref 8.5–10.1)
CALCULATED P AXIS, ECG09: 64 DEGREES
CALCULATED R AXIS, ECG10: 39 DEGREES
CALCULATED T AXIS, ECG11: 63 DEGREES
CHLORIDE SERPL-SCNC: 108 MMOL/L (ref 100–111)
CK MB CFR SERPL CALC: 2.2 % (ref 0–4)
CK MB SERPL-MCNC: 3.7 NG/ML (ref 5–25)
CK SERPL-CCNC: 170 U/L (ref 26–192)
CO2 SERPL-SCNC: 29 MMOL/L (ref 21–32)
CREAT SERPL-MCNC: 1.07 MG/DL (ref 0.6–1.3)
DIAGNOSIS, 93000: NORMAL
DIFFERENTIAL METHOD BLD: NORMAL
EOSINOPHIL # BLD: 0.2 K/UL (ref 0–0.4)
EOSINOPHIL NFR BLD: 2 % (ref 0–5)
ERYTHROCYTE [DISTWIDTH] IN BLOOD BY AUTOMATED COUNT: 12.9 % (ref 11.6–14.5)
GLOBULIN SER CALC-MCNC: 3.4 G/DL (ref 2–4)
GLUCOSE SERPL-MCNC: 101 MG/DL (ref 74–99)
HCT VFR BLD AUTO: 40.6 % (ref 35–45)
HGB BLD-MCNC: 12.9 G/DL (ref 12–16)
LYMPHOCYTES # BLD: 3 K/UL (ref 0.9–3.6)
LYMPHOCYTES NFR BLD: 31 % (ref 21–52)
MCH RBC QN AUTO: 29.6 PG (ref 24–34)
MCHC RBC AUTO-ENTMCNC: 31.8 G/DL (ref 31–37)
MCV RBC AUTO: 93.1 FL (ref 74–97)
MONOCYTES # BLD: 0.6 K/UL (ref 0.05–1.2)
MONOCYTES NFR BLD: 6 % (ref 3–10)
NEUTS SEG # BLD: 5.9 K/UL (ref 1.8–8)
NEUTS SEG NFR BLD: 61 % (ref 40–73)
P-R INTERVAL, ECG05: 126 MS
PLATELET # BLD AUTO: 237 K/UL (ref 135–420)
PMV BLD AUTO: 10.5 FL (ref 9.2–11.8)
POTASSIUM SERPL-SCNC: 4.6 MMOL/L (ref 3.5–5.5)
PROT SERPL-MCNC: 6.5 G/DL (ref 6.4–8.2)
Q-T INTERVAL, ECG07: 412 MS
QRS DURATION, ECG06: 80 MS
QTC CALCULATION (BEZET), ECG08: 466 MS
RBC # BLD AUTO: 4.36 M/UL (ref 4.2–5.3)
SODIUM SERPL-SCNC: 139 MMOL/L (ref 136–145)
TROPONIN I SERPL-MCNC: <0.02 NG/ML (ref 0–0.04)
TROPONIN I SERPL-MCNC: <0.02 NG/ML (ref 0–0.04)
VENTRICULAR RATE, ECG03: 77 BPM
WBC # BLD AUTO: 9.6 K/UL (ref 4.6–13.2)

## 2020-12-31 PROCEDURE — 74011250636 HC RX REV CODE- 250/636: Performed by: EMERGENCY MEDICINE

## 2020-12-31 PROCEDURE — 82550 ASSAY OF CK (CPK): CPT

## 2020-12-31 PROCEDURE — 96374 THER/PROPH/DIAG INJ IV PUSH: CPT

## 2020-12-31 PROCEDURE — 80053 COMPREHEN METABOLIC PANEL: CPT

## 2020-12-31 PROCEDURE — 99285 EMERGENCY DEPT VISIT HI MDM: CPT

## 2020-12-31 PROCEDURE — 84484 ASSAY OF TROPONIN QUANT: CPT

## 2020-12-31 PROCEDURE — 71045 X-RAY EXAM CHEST 1 VIEW: CPT

## 2020-12-31 PROCEDURE — 85025 COMPLETE CBC W/AUTO DIFF WBC: CPT

## 2020-12-31 PROCEDURE — 74011250637 HC RX REV CODE- 250/637: Performed by: EMERGENCY MEDICINE

## 2020-12-31 PROCEDURE — 93005 ELECTROCARDIOGRAM TRACING: CPT

## 2020-12-31 PROCEDURE — 94640 AIRWAY INHALATION TREATMENT: CPT

## 2020-12-31 PROCEDURE — 83880 ASSAY OF NATRIURETIC PEPTIDE: CPT

## 2020-12-31 PROCEDURE — 74011000250 HC RX REV CODE- 250: Performed by: EMERGENCY MEDICINE

## 2020-12-31 PROCEDURE — 96375 TX/PRO/DX INJ NEW DRUG ADDON: CPT

## 2020-12-31 RX ORDER — FUROSEMIDE 10 MG/ML
40 INJECTION INTRAMUSCULAR; INTRAVENOUS ONCE
Status: COMPLETED | OUTPATIENT
Start: 2020-12-31 | End: 2020-12-31

## 2020-12-31 RX ORDER — GUAIFENESIN 100 MG/5ML
162 LIQUID (ML) ORAL
Status: COMPLETED | OUTPATIENT
Start: 2020-12-31 | End: 2020-12-31

## 2020-12-31 RX ORDER — PREDNISONE 50 MG/1
50 TABLET ORAL DAILY
Qty: 3 TAB | Refills: 0 | Status: SHIPPED | OUTPATIENT
Start: 2020-12-31 | End: 2021-01-03

## 2020-12-31 RX ORDER — NITROGLYCERIN 0.4 MG/1
0.4 TABLET SUBLINGUAL
Qty: 1 BOTTLE | Refills: 0 | Status: SHIPPED | OUTPATIENT
Start: 2020-12-31

## 2020-12-31 RX ORDER — CLONIDINE HYDROCHLORIDE 0.1 MG/1
0.2 TABLET ORAL
Status: COMPLETED | OUTPATIENT
Start: 2020-12-31 | End: 2020-12-31

## 2020-12-31 RX ORDER — ALBUTEROL SULFATE 90 UG/1
2 AEROSOL, METERED RESPIRATORY (INHALATION)
Qty: 1 INHALER | Refills: 4 | Status: ON HOLD | OUTPATIENT
Start: 2020-12-31 | End: 2021-01-18 | Stop reason: SDUPTHER

## 2020-12-31 RX ORDER — IPRATROPIUM BROMIDE AND ALBUTEROL SULFATE 2.5; .5 MG/3ML; MG/3ML
3 SOLUTION RESPIRATORY (INHALATION)
Status: COMPLETED | OUTPATIENT
Start: 2020-12-31 | End: 2020-12-31

## 2020-12-31 RX ADMIN — ASPIRIN 81 MG CHEWABLE TABLET 162 MG: 81 TABLET CHEWABLE at 06:42

## 2020-12-31 RX ADMIN — FUROSEMIDE 40 MG: 10 INJECTION, SOLUTION INTRAMUSCULAR; INTRAVENOUS at 11:52

## 2020-12-31 RX ADMIN — CLONIDINE HYDROCHLORIDE 0.2 MG: 0.1 TABLET ORAL at 12:04

## 2020-12-31 RX ADMIN — IPRATROPIUM BROMIDE AND ALBUTEROL SULFATE 3 ML: .5; 3 SOLUTION RESPIRATORY (INHALATION) at 06:42

## 2020-12-31 RX ADMIN — NITROGLYCERIN 1 INCH: 20 OINTMENT TOPICAL at 06:39

## 2020-12-31 RX ADMIN — METHYLPREDNISOLONE SODIUM SUCCINATE 125 MG: 125 INJECTION, POWDER, FOR SOLUTION INTRAMUSCULAR; INTRAVENOUS at 07:15

## 2020-12-31 NOTE — ED NOTES
Patient removed herself off the monitor, left her room, and yelling in the hallway for the doctor. Patient reminded to stay in room and lower voice.

## 2020-12-31 NOTE — ED NOTES
Patient missing from room and unit for about 30 min she has now returned via wheel chair according to staff member she was on the main side of the ED patient states she had to get some clothes she is not dressed in quite a bit more clothing than when she originally arrived. She is fully dressed in several layers of clothing.

## 2020-12-31 NOTE — ED PROVIDER NOTES
Is a 58-year-old woman with past medical history significant for CHF, COPD, chronic respiratory failure where she is on 4 L of home O2, and chronic kidney disease who was brought to the ED by EMS for chest pain and shortness of breath. She states that she was admitted 4 days before Christmas for shortness of breath. She came back to the ED on 27 December 2020 and then again on 29 December 2020. She states that she feels as though her shortness of breath never really got better from the time that she was admitted to the hospital.    She describes her chest pain is left-sided and radiates down her left arm. She is also complaining of pain in her left leg and her left foot. She denies abdominal pain, nausea, vomiting or diarrhea.            Past Medical History:   Diagnosis Date    CHF (congestive heart failure) (HCC)     Chronic respiratory failure with hypoxia ([address] 9/7/2020    CKD (chronic kidney disease) stage 3, GFR 30-59 ml/min 10/31/2019    Cocaine abuse ([address] 11/26/2017    COPD (chronic obstructive pulmonary disease) with chronic bronchitis ([address] 12/19/2017    Dependence on supplemental oxygen     Depression 3/22/2020    Drug-seeking behavior 11/19/2016    Endocrine disease     thyroid issues    Essential hypertension 3/10/2017    Fe deficiency anemia 10/27/2012    Fibromyalgia 12/16/2016    Gastroesophageal reflux disease without esophagitis 10/10/2016    Gastrointestinal disorder     \"blockage in my stomach\"    Hypercholesterolemia     Hypertension     Hypertensive heart failure ([address] 12/19/2017    Morbid obesity due to excess calories ([address] 3/10/2017    NSTEMI (non-ST elevated myocardial infarction) ([address] 3/22/2020    On home O2 12/3/2015    Overview:  2L at home per pt for approx 8 years    Polysubstance abuse ([address] 9/15/2018    Respiratory failure ([address] 1/11/2017    S/P hernia repair 10/31/2019    Sleep apnea 12/19/2017    T wave inversion in EKG 3/9/2019    Tobacco use 7/72/2612    Uncomplicated severe persistent asthma 12/13/2016    Vocal cord disease 12/7/2016       Past Surgical History:   Procedure Laterality Date    HX GI      Exploratory laparotomy with lysis of adhesions and primary repair of incarcerated umbilical hernia         No family history on file.     Social History     Socioeconomic History    Marital status: SINGLE     Spouse name: Not on file    Number of children: Not on file    Years of education: Not on file    Highest education level: Not on file   Occupational History    Not on file   Social Needs    Financial resource strain: Not on file    Food insecurity     Worry: Not on file     Inability: Not on file    Transportation needs     Medical: Not on file     Non-medical: Not on file   Tobacco Use    Smoking status: Former Smoker     Packs/day: 0.25    Smokeless tobacco: Current User    Tobacco comment: Pt states she has not had money to buy cigarettes in the past month   Substance and Sexual Activity    Alcohol use: No     Comment: socially    Drug use: Not Currently     Types: Heroin     Comment: pt reports using approximately a month ago    Sexual activity: Not Currently     Comment: last used 9 years ago   Lifestyle    Physical activity     Days per week: Not on file     Minutes per session: Not on file    Stress: Not on file   Relationships    Social connections     Talks on phone: Not on file     Gets together: Not on file     Attends Advent service: Not on file     Active member of club or organization: Not on file     Attends meetings of clubs or organizations: Not on file     Relationship status: Not on file    Intimate partner violence     Fear of current or ex partner: Not on file     Emotionally abused: Not on file     Physically abused: Not on file     Forced sexual activity: Not on file   Other Topics Concern    Not on file   Social History Narrative    Not on file         ALLERGIES: Patient has no known allergies. Review of Systems   All other systems reviewed and are negative. Vitals:    12/31/20 0415   BP: (!) 164/68   Pulse: 79   Resp: 14   Temp: 98.3 °F (36.8 °C)   SpO2: 100%            Physical Exam  Vitals signs and nursing note reviewed. Constitutional:       General: She is in acute distress. Appearance: She is well-developed. She is obese. HENT:      Head: Normocephalic and atraumatic. Eyes:      Extraocular Movements: Extraocular movements intact. Pupils: Pupils are equal, round, and reactive to light. Neck:      Musculoskeletal: Normal range of motion and neck supple. Cardiovascular:      Rate and Rhythm: Normal rate and regular rhythm. Heart sounds: Normal heart sounds. Pulmonary:      Breath sounds: Decreased breath sounds and wheezing present. Abdominal:      General: Bowel sounds are normal.      Palpations: Abdomen is soft. Musculoskeletal: Normal range of motion. Skin:     General: Skin is warm and dry. Capillary Refill: Capillary refill takes 2 to 3 seconds. Neurological:      General: No focal deficit present. Mental Status: She is alert and oriented to person, place, and time.    Psychiatric:         Mood and Affect: Mood normal.         Behavior: Behavior normal.        Recent Results (from the past 12 hour(s))   EKG, 12 LEAD, INITIAL    Collection Time: 12/31/20  4:30 AM   Result Value Ref Range    Ventricular Rate 77 BPM    Atrial Rate 77 BPM    P-R Interval 126 ms    QRS Duration 80 ms    Q-T Interval 412 ms    QTC Calculation (Bezet) 466 ms    Calculated P Axis 64 degrees    Calculated R Axis 39 degrees    Calculated T Axis 63 degrees    Diagnosis       Normal sinus rhythm  Minimal voltage criteria for LVH, may be normal variant  Borderline ECG  When compared with ECG of 30-DEC-2020 08:39,  No significant change was found         XR CHEST PORT    (Results Pending)         MDM  Number of Diagnoses or Management Options  Acute congestive heart failure, unspecified heart failure type (HCC)  Acute exacerbation of chronic obstructive pulmonary disease (COPD) (HCC)  SOB (shortness of breath)  Diagnosis management comments: Patient is a 77-year-old woman with past medical history significant for CHF, COPD with chronic respiratory failure requiring 4 L, chronic kidney disease, that presents to the ED today with left-sided chest pain and shortness of breath. She appears to be in distress. When she is in the ED, she tends to yell at the staff. Today she is very quiet and I am concerned that she may have a significant illness. She has tight wheezing throughout on her exam.  I have ordered IV Solu-Medrol and albuterol and Atrovent. I have turned the patient over to Dr. Sam Blackman pending lab results and imaging studies. He is very familiar with her and will make the final dispo once more data is available.            Procedures

## 2020-12-31 NOTE — ED NOTES
Reviewed discharge instructions with pt. Pt verbalized understanding of instructions. Pt wheeled to waiting room by security, in no distress.

## 2020-12-31 NOTE — ED PROVIDER NOTES
Patient turned over to me Dr. Noris Liz she is a 78-year-old female who presents with shortness of breath and chest pain. She was seen by me yesterday actually know her well for multiple prior visits. Apparently she came in last night with some wheezing. She certainly did not have it yesterday. Will recheck her troponin and determine need for admission. She does have chronic COPD that often has exacerbations. See her in the room she was not there    She is seen by me. She presents last night with new shortness of breath. Unable to get her medications from [facility] yesterday. She has some intermittent chest pain lasting up to 20 minutes at a time no nausea vomiting or diaphoresis    SOB resolved. No wheezing on exam.   Trop neg x 2. IMPRESSION:  1. Mild pulmonary vascular congestion and interstitial edema/infiltrates. 2.  Borderline cardiac silhouette enlargement. Doubt infiltrate biotics at this time. Question CHF    EKG  shows sinus tach at 77 normal axis normal intervals there is no ST elevation or depression no hypertrophy patient does have history of CHF.   Currently hypoxic will refer to [facility]

## 2020-12-31 NOTE — ED TRIAGE NOTES
Patient A/O x 4, brought into ED via [place] with complaint of continued SOB, chest pain. Patient states she was seen in ED yesterday with same complaints. Patient states she's is on 3-4 liters O2 NC at home.

## 2021-01-05 NOTE — ED PROVIDER NOTES
The patient is a 64 y/o woman with h/o COPD, Depression, Anxiety, HTN, and CHF who was brought to the ED by EMS today for CP and SOB. She states that she was just hospitalized for 3 days due to COPD and just received home O2 today. She has been on 3-4 L all day. Her chest pain is left sided and began earlier today. She states that now that she is in the ED, it has decreased. When it first started, she sat for an hour. Then she took her shoed off and her chest pain got much worse. She states that it feels like a weight or heaviness in her chest and she has been trying to massage it. She denies F/C/N/V/D or lightheadedness.              Past Medical History:   Diagnosis Date    CHF (congestive heart failure) (HCC)     Chronic respiratory failure with hypoxia ([address] 9/7/2020    CKD (chronic kidney disease) stage 3, GFR 30-59 ml/min 10/31/2019    Cocaine abuse ([address] 11/26/2017    COPD (chronic obstructive pulmonary disease) with chronic bronchitis ([address] 12/19/2017    Dependence on supplemental oxygen     Depression 3/22/2020    Drug-seeking behavior 11/19/2016    Endocrine disease     thyroid issues    Essential hypertension 3/10/2017    Fe deficiency anemia 10/27/2012    Fibromyalgia 12/16/2016    Gastroesophageal reflux disease without esophagitis 10/10/2016    Gastrointestinal disorder     \"blockage in my stomach\"    Hypercholesterolemia     Hypertension     Hypertensive heart failure ([address] 12/19/2017    Morbid obesity due to excess calories ([address] 3/10/2017    NSTEMI (non-ST elevated myocardial infarction) ([address] 3/22/2020    On home O2 12/3/2015    Overview:  2L at home per pt for approx 8 years    Polysubstance abuse ([address] 9/15/2018    Respiratory failure ([address] 1/11/2017    S/P hernia repair 10/31/2019    Sleep apnea 12/19/2017    T wave inversion in EKG 3/9/2019    Tobacco use 2/94/3826    Uncomplicated severe persistent asthma 12/13/2016    Vocal cord disease 12/7/2016 Past Surgical History:   Procedure Laterality Date    HX GI      Exploratory laparotomy with lysis of adhesions and primary repair of incarcerated umbilical hernia         No family history on file. Social History     Socioeconomic History    Marital status: SINGLE     Spouse name: Not on file    Number of children: Not on file    Years of education: Not on file    Highest education level: Not on file   Occupational History    Not on file   Social Needs    Financial resource strain: Not on file    Food insecurity     Worry: Not on file     Inability: Not on file    Transportation needs     Medical: Not on file     Non-medical: Not on file   Tobacco Use    Smoking status: Former Smoker     Packs/day: 0.25    Smokeless tobacco: Current User    Tobacco comment: Pt states she has not had money to buy cigarettes in the past month   Substance and Sexual Activity    Alcohol use: No     Comment: socially    Drug use: Not Currently     Types: Heroin     Comment: pt reports using approximately a month ago    Sexual activity: Not Currently     Comment: last used 9 years ago   Lifestyle    Physical activity     Days per week: Not on file     Minutes per session: Not on file    Stress: Not on file   Relationships    Social connections     Talks on phone: Not on file     Gets together: Not on file     Attends Sabianism service: Not on file     Active member of club or organization: Not on file     Attends meetings of clubs or organizations: Not on file     Relationship status: Not on file    Intimate partner violence     Fear of current or ex partner: Not on file     Emotionally abused: Not on file     Physically abused: Not on file     Forced sexual activity: Not on file   Other Topics Concern    Not on file   Social History Narrative    Not on file         ALLERGIES: Patient has no known allergies. Review of Systems   All other systems reviewed and are negative.       Vitals:    12/25/20 0026 12/25/20 0344 12/25/20 0734 12/25/20 0909   BP: 120/75 (!) 149/75  (!) 166/74   Pulse: (!) 59 (!) 52  70   Resp: 19 19 20   Temp: 97.8 °F (36.6 °C) 98.1 °F (36.7 °C)  98.4 °F (36.9 °C)   SpO2: 97% 99% 96% 97%   Weight:       Height:                Physical Exam  Vitals signs and nursing note reviewed. Constitutional:       General: She is in acute distress. Appearance: She is normal weight. HENT:      Head: Normocephalic and atraumatic. Eyes:      Extraocular Movements: Extraocular movements intact. Pupils: Pupils are equal, round, and reactive to light. Neck:      Musculoskeletal: Normal range of motion. Cardiovascular:      Rate and Rhythm: Normal rate and regular rhythm. Chest Wall: PMI is not displaced. Heart sounds: Normal heart sounds. No friction rub. No gallop. Pulmonary:      Effort: Tachypnea present. Breath sounds: Decreased breath sounds and wheezing present. Comments: Decreased air movement with tight wheezing throughout. Chest:      Chest wall: No tenderness. Abdominal:      General: Bowel sounds are normal.      Palpations: Abdomen is soft. Musculoskeletal: Normal range of motion. Right lower leg: No edema. Left lower leg: No edema. Skin:     General: Skin is warm and dry. Capillary Refill: Capillary refill takes 2 to 3 seconds. Neurological:      General: No focal deficit present. Mental Status: She is alert and oriented to person, place, and time. Psychiatric:         Mood and Affect: Mood is anxious. Behavior: Behavior is agitated. Results for Guero Parra (MRN [id number]) as of 1/5/2021 19:29   Ref.  Range 12/22/2020 23:00 12/22/2020 23:21   WBC Latest Ref Range: 4.6 - 13.2 K/uL  10.9   RBC Latest Ref Range: 4.20 - 5.30 M/uL  4.35   HGB Latest Ref Range: 12.0 - 16.0 g/dL  12.8   HCT Latest Ref Range: 35.0 - 45.0 %  38.6   MCV Latest Ref Range: 74.0 - 97.0 FL  88.7   MCH Latest Ref Range: 24.0 - 34.0 PG 29.4   MCHC Latest Ref Range: 31.0 - 37.0 g/dL  33.2   RDW Latest Ref Range: 11.6 - 14.5 %  12.2   PLATELET Latest Ref Range: 135 - 420 K/uL  327   MPV Latest Ref Range: 9.2 - 11.8 FL  10.3   NEUTROPHILS Latest Ref Range: 40 - 73 %  84 (H)   LYMPHOCYTES Latest Ref Range: 21 - 52 %  12 (L)   MONOCYTES Latest Ref Range: 3 - 10 %  4   EOSINOPHILS Latest Ref Range: 0 - 5 %  0   BASOPHILS Latest Ref Range: 0 - 2 %  0   DF Latest Units:    AUTOMATED   ABS. NEUTROPHILS Latest Ref Range: 1.8 - 8.0 K/UL  9.2 (H)   ABS. LYMPHOCYTES Latest Ref Range: 0.9 - 3.6 K/UL  1.3   ABS. MONOCYTES Latest Ref Range: 0.05 - 1.2 K/UL  0.4   ABS. EOSINOPHILS Latest Ref Range: 0.0 - 0.4 K/UL  0.0   ABS. BASOPHILS Latest Ref Range: 0.0 - 0.1 K/UL  0.0   Sodium Latest Ref Range: 136 - 145 mmol/L  139   Potassium Latest Ref Range: 3.5 - 5.5 mmol/L  5.4   Chloride Latest Ref Range: 100 - 111 mmol/L  106   CO2 Latest Ref Range: 21 - 32 mmol/L  28   Anion gap Latest Ref Range: 3.0 - 18 mmol/L  5   Glucose Latest Ref Range: 74 - 99 mg/dL  106 (H)   BUN Latest Ref Range: 7.0 - 18 MG/DL  13   Creatinine Latest Ref Range: 0.6 - 1.3 MG/DL  0.90   BUN/Creatinine ratio Latest Ref Range: 12 - 20    14   Calcium Latest Ref Range: 8.5 - 10.1 MG/DL  8.8   GFR est non-AA Latest Ref Range: >60 ml/min/1.73m2  >60   GFR est AA Latest Ref Range: >60 ml/min/1.73m2  >60   Bilirubin, total Latest Ref Range: 0.2 - 1.0 MG/DL  0.3   Protein, total Latest Ref Range: 6.4 - 8.2 g/dL  7.0   Albumin Latest Ref Range: 3.4 - 5.0 g/dL  3.2 (L)   Globulin Latest Ref Range: 2.0 - 4.0 g/dL  3.8   A-G Ratio Latest Ref Range: 0.8 - 1.7    0.8   ALT Latest Ref Range: 13 - 56 U/L  24   AST Latest Ref Range: 10 - 38 U/L  29   Alk. phosphatase Latest Ref Range: 45 - 117 U/L  82   Troponin-I, Qt. Latest Ref Range: 0.0 - 0.045 NG/ML  <0.02   NT pro-BNP Latest Ref Range: 0 - 900 PG/ML  4,221 (H)   ALCOHOL(ETHYL),SERUM Latest Ref Range: 0 - 3 MG/DL  <3     EKG:  Normal sinus rhythm  Minimal voltage criteria for LVH, may be normal variant   ST elevation, consider early repolarization, pericarditis, or injury   Abnormal ECG   When compared with ECG of 20-DEC-2020 07:35,   Nonspecific T wave abnormality no longer evident in Inferior leads   T wave inversion no longer evident in Lateral leads   QT has lengthened   Confirmed by Ilsa George ([id number]) on 12/24/2020 8:28:31 AM     CXR:  IMPRESSION:  1. Mild pulmonary vascular congestion. 2.  Borderline cardiac silhouette enlargement.       MDM  Number of Diagnoses or Management Options  Acute decompensated heart failure (HCC)  Chest pain, unspecified type  Diagnosis management comments: The patient is a 64 y/o woman with COPD and CHF who presents to the ED with chest pain and SOB. She is most likely having a CHF exacerbation. She has received Ativan, nitropaste, lasix, tylenol, ASA, IV Solumedrol, and A/A nebs. I have consulted the hospitalist for admission for further evaluation and management. They have accepted the patient on their service.            Procedures

## 2021-01-08 ENCOUNTER — APPOINTMENT (OUTPATIENT)
Dept: GENERAL RADIOLOGY | Age: 56
DRG: 139 | End: 2021-01-08
Attending: EMERGENCY MEDICINE
Payer: MEDICAID

## 2021-01-08 ENCOUNTER — HOSPITAL ENCOUNTER (OUTPATIENT)
Age: 56
Setting detail: OBSERVATION
Discharge: LEFT BEFORE DISCHARGE | DRG: 139 | End: 2021-01-09
Attending: EMERGENCY MEDICINE | Admitting: HOSPITALIST
Payer: MEDICAID

## 2021-01-08 VITALS
RESPIRATION RATE: 22 BRPM | OXYGEN SATURATION: 100 % | BODY MASS INDEX: 31.1 KG/M2 | TEMPERATURE: 98 F | SYSTOLIC BLOOD PRESSURE: 174 MMHG | HEART RATE: 105 BPM | DIASTOLIC BLOOD PRESSURE: 80 MMHG | WEIGHT: 154 LBS

## 2021-01-08 DIAGNOSIS — R94.31 LONG QT INTERVAL: ICD-10-CM

## 2021-01-08 DIAGNOSIS — J44.1 ACUTE EXACERBATION OF CHRONIC OBSTRUCTIVE PULMONARY DISEASE (COPD) (HCC): ICD-10-CM

## 2021-01-08 LAB
ALBUMIN SERPL-MCNC: 3.1 G/DL (ref 3.4–5)
ALBUMIN/GLOB SERPL: 0.9 {RATIO} (ref 0.8–1.7)
ALP SERPL-CCNC: 86 U/L (ref 45–117)
ALT SERPL-CCNC: 19 U/L (ref 13–56)
ANION GAP SERPL CALC-SCNC: 5 MMOL/L (ref 3–18)
AST SERPL-CCNC: 11 U/L (ref 10–38)
ATRIAL RATE: 100 BPM
ATRIAL RATE: 101 BPM
BASOPHILS # BLD: 0 K/UL (ref 0–0.1)
BASOPHILS NFR BLD: 0 % (ref 0–2)
BILIRUB SERPL-MCNC: 0.6 MG/DL (ref 0.2–1)
BNP SERPL-MCNC: 1590 PG/ML (ref 0–900)
BUN SERPL-MCNC: 17 MG/DL (ref 7–18)
BUN/CREAT SERPL: 13 (ref 12–20)
CALCIUM SERPL-MCNC: 8.3 MG/DL (ref 8.5–10.1)
CALCULATED P AXIS, ECG09: 63 DEGREES
CALCULATED P AXIS, ECG09: 80 DEGREES
CALCULATED R AXIS, ECG10: 50 DEGREES
CALCULATED R AXIS, ECG10: 61 DEGREES
CALCULATED T AXIS, ECG11: 18 DEGREES
CALCULATED T AXIS, ECG11: 78 DEGREES
CHLORIDE SERPL-SCNC: 109 MMOL/L (ref 100–111)
CK MB CFR SERPL CALC: 4 % (ref 0–4)
CK MB CFR SERPL CALC: 4.3 % (ref 0–4)
CK MB SERPL-MCNC: 1.9 NG/ML (ref 5–25)
CK MB SERPL-MCNC: 2 NG/ML (ref 5–25)
CK SERPL-CCNC: 47 U/L (ref 26–192)
CK SERPL-CCNC: 48 U/L (ref 26–192)
CO2 SERPL-SCNC: 30 MMOL/L (ref 21–32)
COVID-19 RAPID TEST, COVR: NOT DETECTED
CREAT SERPL-MCNC: 1.3 MG/DL (ref 0.6–1.3)
DIAGNOSIS, 93000: NORMAL
DIAGNOSIS, 93000: NORMAL
DIFFERENTIAL METHOD BLD: ABNORMAL
EOSINOPHIL # BLD: 0.3 K/UL (ref 0–0.4)
EOSINOPHIL NFR BLD: 3 % (ref 0–5)
ERYTHROCYTE [DISTWIDTH] IN BLOOD BY AUTOMATED COUNT: 13.2 % (ref 11.6–14.5)
GLOBULIN SER CALC-MCNC: 3.5 G/DL (ref 2–4)
GLUCOSE SERPL-MCNC: 103 MG/DL (ref 74–99)
HCT VFR BLD AUTO: 38.4 % (ref 35–45)
HGB BLD-MCNC: 12.5 G/DL (ref 12–16)
LYMPHOCYTES # BLD: 1.7 K/UL (ref 0.9–3.6)
LYMPHOCYTES NFR BLD: 15 % (ref 21–52)
MAGNESIUM SERPL-MCNC: 1.9 MG/DL (ref 1.6–2.6)
MCH RBC QN AUTO: 29.4 PG (ref 24–34)
MCHC RBC AUTO-ENTMCNC: 32.6 G/DL (ref 31–37)
MCV RBC AUTO: 90.4 FL (ref 74–97)
MONOCYTES # BLD: 0.9 K/UL (ref 0.05–1.2)
MONOCYTES NFR BLD: 8 % (ref 3–10)
NEUTS SEG # BLD: 8.2 K/UL (ref 1.8–8)
NEUTS SEG NFR BLD: 74 % (ref 40–73)
P-R INTERVAL, ECG05: 122 MS
P-R INTERVAL, ECG05: 134 MS
PLATELET # BLD AUTO: 236 K/UL (ref 135–420)
PMV BLD AUTO: 10.9 FL (ref 9.2–11.8)
POTASSIUM SERPL-SCNC: 3.4 MMOL/L (ref 3.5–5.5)
PROT SERPL-MCNC: 6.6 G/DL (ref 6.4–8.2)
Q-T INTERVAL, ECG07: 404 MS
Q-T INTERVAL, ECG07: 422 MS
QRS DURATION, ECG06: 86 MS
QRS DURATION, ECG06: 88 MS
QTC CALCULATION (BEZET), ECG08: 521 MS
QTC CALCULATION (BEZET), ECG08: 547 MS
RBC # BLD AUTO: 4.25 M/UL (ref 4.2–5.3)
SODIUM SERPL-SCNC: 144 MMOL/L (ref 136–145)
SOURCE, COVRS: NORMAL
SPECIMEN TYPE, XMCV1T: NORMAL
TROPONIN I SERPL-MCNC: 0.02 NG/ML (ref 0–0.04)
TROPONIN I SERPL-MCNC: 0.03 NG/ML (ref 0–0.04)
VENTRICULAR RATE, ECG03: 100 BPM
VENTRICULAR RATE, ECG03: 101 BPM
WBC # BLD AUTO: 11.1 K/UL (ref 4.6–13.2)

## 2021-01-08 PROCEDURE — 93005 ELECTROCARDIOGRAM TRACING: CPT

## 2021-01-08 PROCEDURE — 85025 COMPLETE CBC W/AUTO DIFF WBC: CPT

## 2021-01-08 PROCEDURE — 83880 ASSAY OF NATRIURETIC PEPTIDE: CPT

## 2021-01-08 PROCEDURE — 96375 TX/PRO/DX INJ NEW DRUG ADDON: CPT

## 2021-01-08 PROCEDURE — 74011250636 HC RX REV CODE- 250/636: Performed by: EMERGENCY MEDICINE

## 2021-01-08 PROCEDURE — 96365 THER/PROPH/DIAG IV INF INIT: CPT

## 2021-01-08 PROCEDURE — 96376 TX/PRO/DX INJ SAME DRUG ADON: CPT

## 2021-01-08 PROCEDURE — 94640 AIRWAY INHALATION TREATMENT: CPT

## 2021-01-08 PROCEDURE — 80053 COMPREHEN METABOLIC PANEL: CPT

## 2021-01-08 PROCEDURE — 99218 HC RM OBSERVATION: CPT

## 2021-01-08 PROCEDURE — 74011000250 HC RX REV CODE- 250: Performed by: EMERGENCY MEDICINE

## 2021-01-08 PROCEDURE — 99284 EMERGENCY DEPT VISIT MOD MDM: CPT

## 2021-01-08 PROCEDURE — 71045 X-RAY EXAM CHEST 1 VIEW: CPT

## 2021-01-08 PROCEDURE — 94762 N-INVAS EAR/PLS OXIMTRY CONT: CPT

## 2021-01-08 PROCEDURE — 87040 BLOOD CULTURE FOR BACTERIA: CPT

## 2021-01-08 PROCEDURE — 74011000250 HC RX REV CODE- 250: Performed by: NURSE PRACTITIONER

## 2021-01-08 PROCEDURE — 74011250636 HC RX REV CODE- 250/636: Performed by: NURSE PRACTITIONER

## 2021-01-08 PROCEDURE — 87635 SARS-COV-2 COVID-19 AMP PRB: CPT

## 2021-01-08 PROCEDURE — 83735 ASSAY OF MAGNESIUM: CPT

## 2021-01-08 PROCEDURE — 99223 1ST HOSP IP/OBS HIGH 75: CPT | Performed by: HOSPITALIST

## 2021-01-08 PROCEDURE — 74011250637 HC RX REV CODE- 250/637: Performed by: NURSE PRACTITIONER

## 2021-01-08 PROCEDURE — 82553 CREATINE MB FRACTION: CPT

## 2021-01-08 PROCEDURE — 74011250637 HC RX REV CODE- 250/637: Performed by: EMERGENCY MEDICINE

## 2021-01-08 PROCEDURE — 96372 THER/PROPH/DIAG INJ SC/IM: CPT

## 2021-01-08 PROCEDURE — 99220 PR INITIAL OBSERVATION CARE/DAY 70 MINUTES: CPT | Performed by: INTERNAL MEDICINE

## 2021-01-08 RX ORDER — ACETAMINOPHEN 325 MG/1
650 TABLET ORAL
Status: DISCONTINUED | OUTPATIENT
Start: 2021-01-08 | End: 2021-01-09 | Stop reason: HOSPADM

## 2021-01-08 RX ORDER — IPRATROPIUM BROMIDE AND ALBUTEROL SULFATE 2.5; .5 MG/3ML; MG/3ML
3 SOLUTION RESPIRATORY (INHALATION)
Status: DISPENSED | OUTPATIENT
Start: 2021-01-08 | End: 2021-01-08

## 2021-01-08 RX ORDER — ATORVASTATIN CALCIUM 20 MG/1
20 TABLET, FILM COATED ORAL
Status: DISCONTINUED | OUTPATIENT
Start: 2021-01-08 | End: 2021-01-09 | Stop reason: HOSPADM

## 2021-01-08 RX ORDER — MAGNESIUM SULFATE HEPTAHYDRATE 40 MG/ML
2 INJECTION, SOLUTION INTRAVENOUS ONCE
Status: COMPLETED | OUTPATIENT
Start: 2021-01-08 | End: 2021-01-08

## 2021-01-08 RX ORDER — ACETAMINOPHEN 650 MG/1
650 SUPPOSITORY RECTAL
Status: DISCONTINUED | OUTPATIENT
Start: 2021-01-08 | End: 2021-01-09 | Stop reason: HOSPADM

## 2021-01-08 RX ORDER — AMLODIPINE BESYLATE 10 MG/1
10 TABLET ORAL DAILY
Status: DISCONTINUED | OUTPATIENT
Start: 2021-01-09 | End: 2021-01-09 | Stop reason: HOSPADM

## 2021-01-08 RX ORDER — SODIUM CHLORIDE 0.9 % (FLUSH) 0.9 %
5-40 SYRINGE (ML) INJECTION AS NEEDED
Status: DISCONTINUED | OUTPATIENT
Start: 2021-01-08 | End: 2021-01-09 | Stop reason: HOSPADM

## 2021-01-08 RX ORDER — ALBUTEROL SULFATE 1.25 MG/3ML
1.25 SOLUTION RESPIRATORY (INHALATION)
Status: DISCONTINUED | OUTPATIENT
Start: 2021-01-08 | End: 2021-01-09 | Stop reason: HOSPADM

## 2021-01-08 RX ORDER — POTASSIUM CHLORIDE 20 MEQ/1
40 TABLET, EXTENDED RELEASE ORAL
Status: COMPLETED | OUTPATIENT
Start: 2021-01-08 | End: 2021-01-08

## 2021-01-08 RX ORDER — IPRATROPIUM BROMIDE AND ALBUTEROL SULFATE 2.5; .5 MG/3ML; MG/3ML
3 SOLUTION RESPIRATORY (INHALATION)
Status: COMPLETED | OUTPATIENT
Start: 2021-01-08 | End: 2021-01-08

## 2021-01-08 RX ORDER — SODIUM CHLORIDE 0.9 % (FLUSH) 0.9 %
5-40 SYRINGE (ML) INJECTION EVERY 8 HOURS
Status: DISCONTINUED | OUTPATIENT
Start: 2021-01-08 | End: 2021-01-09 | Stop reason: HOSPADM

## 2021-01-08 RX ORDER — HEPARIN SODIUM 5000 [USP'U]/ML
5000 INJECTION, SOLUTION INTRAVENOUS; SUBCUTANEOUS EVERY 8 HOURS
Status: DISCONTINUED | OUTPATIENT
Start: 2021-01-08 | End: 2021-01-09 | Stop reason: HOSPADM

## 2021-01-08 RX ORDER — POLYETHYLENE GLYCOL 3350 17 G/17G
17 POWDER, FOR SOLUTION ORAL DAILY PRN
Status: DISCONTINUED | OUTPATIENT
Start: 2021-01-08 | End: 2021-01-09 | Stop reason: HOSPADM

## 2021-01-08 RX ORDER — MAGNESIUM SULFATE HEPTAHYDRATE 40 MG/ML
2 INJECTION, SOLUTION INTRAVENOUS
Status: DISCONTINUED | OUTPATIENT
Start: 2021-01-08 | End: 2021-01-08 | Stop reason: SDUPTHER

## 2021-01-08 RX ORDER — LISINOPRIL 20 MG/1
20 TABLET ORAL DAILY
Status: DISCONTINUED | OUTPATIENT
Start: 2021-01-09 | End: 2021-01-09 | Stop reason: HOSPADM

## 2021-01-08 RX ORDER — AMOXICILLIN 250 MG
1 CAPSULE ORAL 2 TIMES DAILY
Status: DISCONTINUED | OUTPATIENT
Start: 2021-01-08 | End: 2021-01-09 | Stop reason: HOSPADM

## 2021-01-08 RX ORDER — MONTELUKAST SODIUM 10 MG/1
10 TABLET ORAL
Status: DISCONTINUED | OUTPATIENT
Start: 2021-01-08 | End: 2021-01-09 | Stop reason: HOSPADM

## 2021-01-08 RX ORDER — CLONIDINE HYDROCHLORIDE 0.1 MG/1
0.2 TABLET ORAL 3 TIMES DAILY
Status: DISCONTINUED | OUTPATIENT
Start: 2021-01-08 | End: 2021-01-09 | Stop reason: HOSPADM

## 2021-01-08 RX ORDER — FAMOTIDINE 20 MG/1
20 TABLET, FILM COATED ORAL DAILY
Status: DISCONTINUED | OUTPATIENT
Start: 2021-01-09 | End: 2021-01-09 | Stop reason: HOSPADM

## 2021-01-08 RX ORDER — ASPIRIN 81 MG/1
81 TABLET ORAL DAILY
Status: DISCONTINUED | OUTPATIENT
Start: 2021-01-09 | End: 2021-01-09 | Stop reason: HOSPADM

## 2021-01-08 RX ORDER — FUROSEMIDE 10 MG/ML
40 INJECTION INTRAMUSCULAR; INTRAVENOUS
Status: COMPLETED | OUTPATIENT
Start: 2021-01-08 | End: 2021-01-08

## 2021-01-08 RX ADMIN — MONTELUKAST SODIUM 10 MG: 10 TABLET, COATED ORAL at 21:09

## 2021-01-08 RX ADMIN — IPRATROPIUM BROMIDE AND ALBUTEROL SULFATE 3 ML: .5; 3 SOLUTION RESPIRATORY (INHALATION) at 06:40

## 2021-01-08 RX ADMIN — MAGNESIUM SULFATE HEPTAHYDRATE 2 G: 40 INJECTION, SOLUTION INTRAVENOUS at 18:34

## 2021-01-08 RX ADMIN — IPRATROPIUM BROMIDE AND ALBUTEROL SULFATE 3 ML: .5; 3 SOLUTION RESPIRATORY (INHALATION) at 02:49

## 2021-01-08 RX ADMIN — ARFORMOTEROL TARTRATE: 15 SOLUTION RESPIRATORY (INHALATION) at 21:10

## 2021-01-08 RX ADMIN — ATORVASTATIN CALCIUM 20 MG: 20 TABLET, FILM COATED ORAL at 21:09

## 2021-01-08 RX ADMIN — IPRATROPIUM BROMIDE AND ALBUTEROL SULFATE 3 ML: .5; 3 SOLUTION RESPIRATORY (INHALATION) at 03:07

## 2021-01-08 RX ADMIN — IPRATROPIUM BROMIDE AND ALBUTEROL SULFATE 3 ML: .5; 3 SOLUTION RESPIRATORY (INHALATION) at 03:06

## 2021-01-08 RX ADMIN — METHYLPREDNISOLONE SODIUM SUCCINATE 20 MG: 40 INJECTION, POWDER, FOR SOLUTION INTRAMUSCULAR; INTRAVENOUS at 21:09

## 2021-01-08 RX ADMIN — CLONIDINE HYDROCHLORIDE 0.2 MG: 0.1 TABLET ORAL at 21:09

## 2021-01-08 RX ADMIN — FUROSEMIDE 40 MG: 10 INJECTION, SOLUTION INTRAMUSCULAR; INTRAVENOUS at 03:57

## 2021-01-08 RX ADMIN — POTASSIUM CHLORIDE 40 MEQ: 1500 TABLET, EXTENDED RELEASE ORAL at 18:36

## 2021-01-08 RX ADMIN — METHYLPREDNISOLONE SODIUM SUCCINATE 125 MG: 125 INJECTION, POWDER, FOR SOLUTION INTRAMUSCULAR; INTRAVENOUS at 03:57

## 2021-01-08 RX ADMIN — HEPARIN SODIUM 5000 UNITS: 5000 INJECTION INTRAVENOUS; SUBCUTANEOUS at 18:06

## 2021-01-08 RX ADMIN — ALBUTEROL SULFATE 1.25 MG: 1.25 SOLUTION RESPIRATORY (INHALATION) at 18:34

## 2021-01-08 RX ADMIN — IPRATROPIUM BROMIDE AND ALBUTEROL SULFATE 3 ML: .5; 3 SOLUTION RESPIRATORY (INHALATION) at 02:28

## 2021-01-08 NOTE — ED NOTES
Patient turned over to me Dr. Noris Liz she is very well-known to me 48-year-old female history of asthma and coronary disease presents with chest tightness shortness of breath and wheezing. Seen by Dr. Adelso Wagoner earlier currently just pending resolution of her wheezing. On examination approximately 7:53 AM she has mild expiratory wheeze certainly should not need to be admitted for this but will continue with observation at this time    CBC is unremarkable slight shift no bands  Negative x2 mild elevation in BNP    X-ray does show some mild CHF but actually looks improved compared to prior    EKG shows sinus at 100 with a normal axis and normal intervals except QTC of 521 no ST elevation or depression no hypertrophy    Last      k 3.4 and mag 1.9    QTC actually 547. Patient is still short of breath but walking around the department actually left the department for little while so I do not think she needs to be admitted for her COPD she is not on oxygen but his QTC is concerning we will recheck her more time.     KG shows QTC of 519 still long will admit to the hospitalist service the QTC does appear to be greater than one half the RR interval

## 2021-01-08 NOTE — H&P
HOSPITALIST DEPARTMENT HISTORY AND PHYSICAL EXAM    Date: 1/8/2021  Patient Name: Ganga Healy    History of Presenting Illness     Chief Complaint   Patient presents with    Shortness of Breath       History Provided By: Patient  Chief complaint: shortness of breath. Worsening since last ER visit 1/5/2021 despite taking her medications. Duration: >2 weeks   Timing: constant   Location: respiratory   Quality: n/a  Severity : 9/10  Modifying factors : none  Associated Symptoms: wheezing, chest tightness. No chest pain. No n/v. No diaphoresis. No fevers or chills. Additional History (Context): Ganga Healy is a 63 y.o. female with a complex past medical history significant for COPD, HTN, chronic diastolic CHF, chronic hypoxic respiratory failure, depression, chronic pain, and hx IVDU on methadone. She presented to the ED for COPD exacerbation, was found to have prolonged QTc interval. She is being admitted to observation with cardiology following. PCP: Sylvia Swenson MD    Current Facility-Administered Medications   Medication Dose Route Frequency Provider Last Rate Last Admin    magnesium sulfate 2 g/50 ml IVPB (premix or compounded)  2 g IntraVENous ONCE Jose Cuevas MD        potassium chloride (K-DUR, KLOR-CON) SR tablet 40 mEq  40 mEq Oral NOW Jose Cuevas MD         Current Outpatient Medications   Medication Sig Dispense Refill    albuterol (PROVENTIL HFA, VENTOLIN HFA, PROAIR HFA) 90 mcg/actuation inhaler Take 2 Puffs by inhalation every four (4) hours as needed for Wheezing or Shortness of Breath. Indications: bronchospasm prevention 1 Inhaler 4    nitroglycerin (NITROSTAT) 0.4 mg SL tablet Take 1 Tab by mouth every five (5) minutes as needed for Chest Pain. Sit/Lay down then put one tab under the tongue every 5 minutes as needed for chest pain for 3 doses 1 Bottle 0    aspirin delayed-release 81 mg tablet Take 1 Tab by mouth daily.  30 Tab 0    amLODIPine (NORVASC) 10 mg tablet Take 1 Tab by mouth daily for 30 days. Indications: high blood pressure 30 Tab 0    lisinopriL (PriniviL) 20 mg tablet Take 1 Tab by mouth daily for 30 days. 30 Tab 0    cloNIDine HCL (CATAPRES) 0.2 mg tablet Take 1 Tab by mouth three (3) times daily for 30 days. 90 Tab 0    fluticasone furoate-vilanteroL (Breo Ellipta) 200-25 mcg/dose inhaler Take 1 Puff by inhalation daily. Indications: controller medication for asthma 1 Inhaler 3    montelukast (SINGULAIR) 10 mg tablet Take 1 Tab by mouth nightly. Indications: controller medication for asthma 30 Tab 1    roflumilast (DALIRESP) 500 mcg tab tablet Take 1 Tab by mouth daily. 30 Tab 3    albuterol (ACCUNEB) 1.25 mg/3 mL nebu Take 3 mL by inhalation every four (4) hours as needed for Wheezing (wheezing). Indications: bronchospasm prevention 25 Each 5    citalopram (CeleXA) 20 mg tablet Take 1 Tab by mouth every morning. 30 Tab 0    cloNIDine HCL (CATAPRES) 0.2 mg tablet Take 0.2 mg by mouth three (3) times daily.  famotidine (PEPCID) 20 mg tablet Take 1 Tab by mouth daily. (Patient taking differently: Take 20 mg by mouth daily as needed.) 30 Tab 1    OXYGEN-AIR DELIVERY SYSTEMS 3 L by IntraNASal route as needed for Other (sob).  atorvastatin (LIPITOR) 20 mg tablet Take 1 Tab by mouth nightly. 30 Tab 0    naloxone (NARCAN) 4 mg/actuation nasal spray Use 1 spray intranasally, then discard. Repeat with new spray every 2 min as needed for opioid overdose symptoms, alternating nostrils.  1 Each 0       Past History     Past Medical History:  Past Medical History:   Diagnosis Date    CHF (congestive heart failure) (HCC)     Chronic respiratory failure with hypoxia ([facility] 9/7/2020    CKD (chronic kidney disease) stage 3, GFR 30-59 ml/min 10/31/2019    Cocaine abuse ([facility] [address] 11/26/2017    COPD (chronic obstructive pulmonary disease) with chronic bronchitis ([facility] [address] 12/19/2017    Dependence on supplemental oxygen     Depression 3/22/2020    Drug-seeking behavior 11/19/2016    Endocrine disease     thyroid issues    Essential hypertension 3/10/2017    Fe deficiency anemia 10/27/2012    Fibromyalgia 12/16/2016    Gastroesophageal reflux disease without esophagitis 10/10/2016    Gastrointestinal disorder     \"blockage in my stomach\"    Hypercholesterolemia     Hypertension     Hypertensive heart failure ([address] 12/19/2017    Morbid obesity due to excess calories ([address] 3/10/2017    NSTEMI (non-ST elevated myocardial infarction) ([address] 3/22/2020    On home O2 12/3/2015    Overview:  2L at home per pt for approx 8 years    Polysubstance abuse ([address] 9/15/2018    Respiratory failure ([address] 1/11/2017    S/P hernia repair 10/31/2019    Sleep apnea 12/19/2017    T wave inversion in EKG 3/9/2019    Tobacco use 6/34/6932    Uncomplicated severe persistent asthma 12/13/2016    Vocal cord disease 12/7/2016       Past Surgical History:  Past Surgical History:   Procedure Laterality Date    HX GI      Exploratory laparotomy with lysis of adhesions and primary repair of incarcerated umbilical hernia       Family History:  History reviewed. No pertinent family history. Social History:  Social History     Tobacco Use    Smoking status: Former Smoker     Packs/day: 0.25    Smokeless tobacco: Current User    Tobacco comment: Pt states she has not had money to buy cigarettes in the past month   Substance Use Topics    Alcohol use: No     Comment: socially    Drug use: Not Currently     Types: Heroin     Comment: pt reports using approximately a month ago       Allergies:  No Known Allergies      Review of Systems       Review of Systems   Constitutional: Negative for fatigue, fever and unexpected weight change. HENT: Negative for postnasal drip, rhinorrhea, sinus pain, sore throat and trouble swallowing. Eyes: Negative for pain, discharge and redness. Respiratory: Positive for cough, chest tightness, shortness of breath and wheezing.  Negative for apnea and choking. Cardiovascular: Negative for chest pain, palpitations and leg swelling. Gastrointestinal: Negative for abdominal pain, blood in stool, constipation, diarrhea, nausea and vomiting. Genitourinary: Negative for difficulty urinating, dysuria, flank pain, frequency and urgency. Musculoskeletal: Positive for back pain. Negative for joint swelling, neck pain and neck stiffness. Skin: Negative for rash and wound. Neurological: Negative for dizziness, weakness, numbness and headaches. Physical Exam     Visit Vitals  BP (!) 195/49   Pulse (!) 109   Temp 98.1 °F (36.7 °C)   Resp 24   Wt 69.9 kg (154 lb)   LMP 04/14/2009   SpO2 97%   BMI 31.10 kg/m²         Physical Exam  Vitals signs and nursing note reviewed. Constitutional:       General: She is not in acute distress. Appearance: Normal appearance. She is obese. She is not ill-appearing, toxic-appearing or diaphoretic. HENT:      Head: Normocephalic and atraumatic. Eyes:      Conjunctiva/sclera: Conjunctivae normal.   Neck:      Musculoskeletal: Normal range of motion and neck supple. No neck rigidity or muscular tenderness. Cardiovascular:      Rate and Rhythm: Regular rhythm. Tachycardia present. Pulses: Normal pulses. Heart sounds: Normal heart sounds. Pulmonary:      Effort: Pulmonary effort is normal.      Breath sounds: Rhonchi present. Abdominal:      General: Bowel sounds are normal.      Palpations: Abdomen is soft. Musculoskeletal: Normal range of motion. Right lower leg: No edema. Left lower leg: No edema. Skin:     General: Skin is warm and dry. Capillary Refill: Capillary refill takes less than 2 seconds. Neurological:      General: No focal deficit present. Mental Status: She is alert and oriented to person, place, and time.          Diagnostic Study Results     Labs -  Recent Results (from the past 12 hour(s))   EKG, 12 LEAD, SUBSEQUENT    Collection Time: 01/08/21 11:35 AM   Result Value Ref Range    Ventricular Rate 101 BPM    Atrial Rate 101 BPM    P-R Interval 122 ms    QRS Duration 88 ms    Q-T Interval 422 ms    QTC Calculation (Bezet) 547 ms    Calculated P Axis 80 degrees    Calculated R Axis 50 degrees    Calculated T Axis 78 degrees    Diagnosis       Poor data quality, interpretation may be adversely affected  Sinus tachycardia  Biatrial enlargement  Pulmonary disease pattern  Left ventricular hypertrophy  Prolonged QT  Abnormal ECG  When compared with ECG of 08-JAN-2021 05:16,  Nonspecific T wave abnormality no longer evident in Inferior leads     EKG, 12 LEAD, SUBSEQUENT    Collection Time: 01/08/21  3:48 PM   Result Value Ref Range    Ventricular Rate 108 BPM    Atrial Rate 108 BPM    P-R Interval 132 ms    QRS Duration 84 ms    Q-T Interval 388 ms    QTC Calculation (Bezet) 519 ms    Calculated P Axis 78 degrees    Calculated R Axis 67 degrees    Calculated T Axis 65 degrees    Diagnosis       Sinus tachycardia  Possible Left atrial enlargement  Left ventricular hypertrophy  Abnormal ECG  When compared with ECG of 08-JAN-2021 11:35,  No significant change was found         Radiologic Studies -   XR CHEST PORT   Final Result   IMPRESSION:       No acute findings. A/P     1. Prolonged QTc up to 547 msec. 2. Hypokalemia   3. COPD exacerbation  4. Chronic diastolic CHF   5. Chronic hypoxia respiratory failure  5. HTN   6. HLD  7. Tobacco dependence with recent cessation 11/2020  8. H/o IVDU  9. Chronic pain     PLAN    1. Cardiology following. Suspected worsened QT from combination of methadone, hypokalemia. UDS pending. Continue tele, follow up EKG tonight and in AM.   2. IV solumedrol, budesonide / Gurney Luster. Duoneb. Incentive spirometer, bronchial hygiene protocol. Consider pulm consult in AM if no improvement. Case management consult given patient with frequent ED visit d/t COPD.    2. Cardiology recommend aggressive BP management however patient did not take her medications prior to coming to the ER. Will continue amlodipine / lisinopril, and clonidine for now. Adjust in AM if warranted. 3. Will avoid narcotics, methadone for now until cleared by cardiology. 4. Replete lytes and trend daily. 5. Pepcid. This patient is a Full CODE.   DVT prophylaxis: Heparin. I reviewed the vital signs, available nursing notes, past medical history, past surgical history, family history and social history. Vital Signs-Reviewed the patient's vital signs. Records Reviewed: Nursing Notes and Old Medical Records (Time of Review: 5:35 PM)      Patient's Medications   Start Taking    No medications on file   Continue Taking    ALBUTEROL (ACCUNEB) 1.25 MG/3 ML NEBU    Take 3 mL by inhalation every four (4) hours as needed for Wheezing (wheezing). Indications: bronchospasm prevention    ALBUTEROL (PROVENTIL HFA, VENTOLIN HFA, PROAIR HFA) 90 MCG/ACTUATION INHALER    Take 2 Puffs by inhalation every four (4) hours as needed for Wheezing or Shortness of Breath. Indications: bronchospasm prevention    AMLODIPINE (NORVASC) 10 MG TABLET    Take 1 Tab by mouth daily for 30 days. Indications: high blood pressure    ASPIRIN DELAYED-RELEASE 81 MG TABLET    Take 1 Tab by mouth daily. ATORVASTATIN (LIPITOR) 20 MG TABLET    Take 1 Tab by mouth nightly. CITALOPRAM (CELEXA) 20 MG TABLET    Take 1 Tab by mouth every morning. CLONIDINE HCL (CATAPRES) 0.2 MG TABLET    Take 0.2 mg by mouth three (3) times daily. CLONIDINE HCL (CATAPRES) 0.2 MG TABLET    Take 1 Tab by mouth three (3) times daily for 30 days. FAMOTIDINE (PEPCID) 20 MG TABLET    Take 1 Tab by mouth daily. FLUTICASONE FUROATE-VILANTEROL (BREO ELLIPTA) 200-25 MCG/DOSE INHALER    Take 1 Puff by inhalation daily. Indications: controller medication for asthma    LISINOPRIL (PRINIVIL) 20 MG TABLET    Take 1 Tab by mouth daily for 30 days.     MONTELUKAST (SINGULAIR) 10 MG TABLET    Take 1 Tab by mouth nightly. Indications: controller medication for asthma    NALOXONE (NARCAN) 4 MG/ACTUATION NASAL SPRAY    Use 1 spray intranasally, then discard. Repeat with new spray every 2 min as needed for opioid overdose symptoms, alternating nostrils. NITROGLYCERIN (NITROSTAT) 0.4 MG SL TABLET    Take 1 Tab by mouth every five (5) minutes as needed for Chest Pain. Sit/Lay down then put one tab under the tongue every 5 minutes as needed for chest pain for 3 doses    OXYGEN-AIR DELIVERY SYSTEMS    3 L by IntraNASal route as needed for Other (sob). ROFLUMILAST (DALIRESP) 500 MCG TAB TABLET    Take 1 Tab by mouth daily. These Medications have changed    No medications on file   Stop Taking    No medications on file       Dictation disclaimer:  Please note that this dictation was completed with rPath, the [employer] voice recognition software. Quite often unanticipated grammatical, syntax, homophones, and other interpretive errors are inadvertently transcribed by the computer software. Please disregard these errors. Please excuse any errors that have escaped final proofreading.

## 2021-01-08 NOTE — ED NOTES
IV placed, patient states it was uncomfortable and infiltrated and pulled the IV out. Provider notified.

## 2021-01-08 NOTE — ED PROVIDER NOTES
Ailin Jj is a 63 y.o. female who is very well-known to the emergency department here with a past medical history of hypertension, hyperlipidemia, CHF, and severe COPD on home O2 coming in complaining of shortness of breath. Patient states she has been having worsening shortness of breath at home for the last 24 hours. She states that she is coughing and it is productive of yellow-green mucus. She reports some chest tightness with respirations, but denies any severe chest pain. Reports some chronic leg swelling and states that she has been given Lasix at times, but does not take any at home. Denies any known fevers or chills. Has been using her albuterol at home without much relief. Patient states that she was last tested for Covid-19 here and that was a few weeks ago. Denies any other known sick contacts. No other complaints at this time.            Past Medical History:   Diagnosis Date    CHF (congestive heart failure) (HCC)     Chronic respiratory failure with hypoxia ([address] 9/7/2020    CKD (chronic kidney disease) stage 3, GFR 30-59 ml/min 10/31/2019    Cocaine abuse ([address] 11/26/2017    COPD (chronic obstructive pulmonary disease) with chronic bronchitis ([address] 12/19/2017    Dependence on supplemental oxygen     Depression 3/22/2020    Drug-seeking behavior 11/19/2016    Endocrine disease     thyroid issues    Essential hypertension 3/10/2017    Fe deficiency anemia 10/27/2012    Fibromyalgia 12/16/2016    Gastroesophageal reflux disease without esophagitis 10/10/2016    Gastrointestinal disorder     \"blockage in my stomach\"    Hypercholesterolemia     Hypertension     Hypertensive heart failure ([address] 12/19/2017    Morbid obesity due to excess calories ([address] 3/10/2017    NSTEMI (non-ST elevated myocardial infarction) ([address] 3/22/2020    On home O2 12/3/2015    Overview:  2L at home per pt for approx 8 years    Polysubstance abuse ([address] 9/15/2018    Respiratory failure ([address] 1/11/2017    S/P hernia repair 10/31/2019    Sleep apnea 12/19/2017    T wave inversion in EKG 3/9/2019    Tobacco use 4/70/6858    Uncomplicated severe persistent asthma 12/13/2016    Vocal cord disease 12/7/2016       Past Surgical History:   Procedure Laterality Date    HX GI      Exploratory laparotomy with lysis of adhesions and primary repair of incarcerated umbilical hernia         History reviewed. No pertinent family history.     Social History     Socioeconomic History    Marital status: SINGLE     Spouse name: Not on file    Number of children: Not on file    Years of education: Not on file    Highest education level: Not on file   Occupational History    Not on file   Social Needs    Financial resource strain: Not on file    Food insecurity     Worry: Not on file     Inability: Not on file    Transportation needs     Medical: Not on file     Non-medical: Not on file   Tobacco Use    Smoking status: Former Smoker     Packs/day: 0.25    Smokeless tobacco: Current User    Tobacco comment: Pt states she has not had money to buy cigarettes in the past month   Substance and Sexual Activity    Alcohol use: No     Comment: socially    Drug use: Not Currently     Types: Heroin     Comment: pt reports using approximately a month ago    Sexual activity: Not Currently     Comment: last used 9 years ago   Lifestyle    Physical activity     Days per week: Not on file     Minutes per session: Not on file    Stress: Not on file   Relationships    Social connections     Talks on phone: Not on file     Gets together: Not on file     Attends Hinduism service: Not on file     Active member of club or organization: Not on file     Attends meetings of clubs or organizations: Not on file     Relationship status: Not on file    Intimate partner violence     Fear of current or ex partner: Not on file     Emotionally abused: Not on file     Physically abused: Not on file     Forced sexual activity: Not on file   Other Topics Concern    Not on file   Social History Narrative    Not on file         ALLERGIES: Patient has no known allergies. Review of Systems   Constitutional: Negative. Negative for chills and fever. Respiratory: Positive for cough, chest tightness, shortness of breath and wheezing. Cardiovascular: Positive for leg swelling. Negative for chest pain. Gastrointestinal: Negative. Negative for abdominal pain, nausea and vomiting. Musculoskeletal: Negative. Negative for myalgias. Skin: Negative. Negative for rash. Neurological: Negative. Negative for dizziness, weakness and light-headedness. All other systems reviewed and are negative. Vitals:    01/08/21 0415 01/08/21 1329 01/08/21 1630 01/08/21 1834   BP: (!) 195/49  (!) 192/87 (!) 192/87   Pulse: (!) 109  (!) 111 (!) 110   Resp: 24  25    Temp: 98.1 °F (36.7 °C)      SpO2: 97%  100%    Weight:  69.9 kg (154 lb)              Physical Exam  Vitals signs reviewed. Constitutional:       Appearance: Normal appearance. She is well-developed. HENT:      Head: Normocephalic and atraumatic. Mouth/Throat:      Mouth: Mucous membranes are moist.   Eyes:      Extraocular Movements: Extraocular movements intact. Conjunctiva/sclera: Conjunctivae normal.      Pupils: Pupils are equal, round, and reactive to light. Neck:      Musculoskeletal: Normal range of motion and neck supple. Cardiovascular:      Rate and Rhythm: Regular rhythm. Tachycardia present. Heart sounds: S1 normal and S2 normal. No murmur. No friction rub. No gallop. Pulmonary:      Effort: Tachypnea, accessory muscle usage, prolonged expiration and respiratory distress present. Comments: Patient is expiratory wheezing in all lung fields with a prolonged expiratory phase and excess abdominal muscle use with respirations. She's speaking in 3-5 word sentences. Abdominal:      General: There is no distension. Tenderness:  There is no abdominal tenderness. Musculoskeletal: Normal range of motion. General: No tenderness. Skin:     General: Skin is warm. Findings: No rash. Neurological:      General: No focal deficit present. Mental Status: She is alert and oriented to person, place, and time. Psychiatric:         Speech: Speech normal.          MDM  Number of Diagnoses or Management Options  Diagnosis management comments: Christel Jaquez is a 63 y.o. female coming in complaining of shortness of breath and productive cough. She does have significantly increased work of breathing and wheezing. Will treat with Solu-Medrol, mag, and albuterol. Differential diagnosis includes community-acquired pneumonia, hospital-acquired pneumonia, Covid-19, COPD exacerbation, CHF, among others. Given her risk factors, risk of complications, and complicated medical history will initiate broad work-up and treatment.          Procedures      Vitals:  Patient Vitals for the past 12 hrs:   Pulse Resp BP SpO2   01/08/21 1834 (!) 110  (!) 192/87    01/08/21 1630 (!) 111 25 (!) 192/87 100 %       Medications ordered:   Medications   albuterol-ipratropium (DUO-NEB) 2.5 MG-0.5 MG/3 ML (3 mL Nebulization Given 1/8/21 0640)   albuterol (ACCUNEB) nebulizer solution 1.25 mg (1.25 mg Inhalation Given 1/8/21 1834)   amLODIPine (NORVASC) tablet 10 mg (has no administration in time range)   aspirin delayed-release tablet 81 mg (has no administration in time range)   atorvastatin (LIPITOR) tablet 20 mg (has no administration in time range)   cloNIDine HCL (CATAPRES) tablet 0.2 mg (has no administration in time range)   famotidine (PEPCID) tablet 20 mg (has no administration in time range)   lisinopriL (PRINIVIL, ZESTRIL) tablet 20 mg (has no administration in time range)   montelukast (SINGULAIR) tablet 10 mg (has no administration in time range)   roflumilast (DALIRESP) tablet 500 mcg (has no administration in time range)   sodium chloride (NS) flush 5-40 mL (has no administration in time range)   sodium chloride (NS) flush 5-40 mL (has no administration in time range)   acetaminophen (TYLENOL) tablet 650 mg (has no administration in time range)     Or   acetaminophen (TYLENOL) suppository 650 mg (has no administration in time range)   polyethylene glycol (MIRALAX) packet 17 g (has no administration in time range)   senna-docusate (PERICOLACE) 8.6-50 mg per tablet 1 Tab (has no administration in time range)   heparin (porcine) injection 5,000 Units (5,000 Units SubCUTAneous Given 1/8/21 1806)   arformoterol 15 mcg/budesonide 0.5 mg neb solution (has no administration in time range)   methylPREDNISolone (PF) (SOLU-MEDROL) injection 20 mg (has no administration in time range)   albuterol-ipratropium (DUO-NEB) 2.5 MG-0.5 MG/3 ML (3 mL Nebulization Given 1/8/21 0307)   methylPREDNISolone (PF) (Solu-MEDROL) injection 125 mg (125 mg IntraVENous Given 1/8/21 0357)   furosemide (LASIX) injection 40 mg (40 mg IntraVENous Given 1/8/21 0357)   magnesium sulfate 2 g/50 ml IVPB (premix or compounded) (2 g IntraVENous New Bag 1/8/21 1834)   potassium chloride (K-DUR, KLOR-CON) SR tablet 40 mEq (40 mEq Oral Given 1/8/21 1836)         Lab findings:  Recent Results (from the past 12 hour(s))   EKG, 12 LEAD, SUBSEQUENT    Collection Time: 01/08/21 11:35 AM   Result Value Ref Range    Ventricular Rate 101 BPM    Atrial Rate 101 BPM    P-R Interval 122 ms    QRS Duration 88 ms    Q-T Interval 422 ms    QTC Calculation (Bezet) 547 ms    Calculated P Axis 80 degrees    Calculated R Axis 50 degrees    Calculated T Axis 78 degrees    Diagnosis       Sinus tachycardia  Biatrial enlargement  Pulmonary disease pattern  Left ventricular hypertrophy  Prolonged QT  Abnormal ECG  When compared with ECG of 08-JAN-2021 05:16,  Nonspecific T wave abnormality no longer evident in Inferior leads  Confirmed by Matti Dawson MD, Tammi Pizano ([id number]) on 1/8/2021 6:14:06 PM     EKG, 12 LEAD, SUBSEQUENT    Collection Time: 01/08/21  3:48 PM   Result Value Ref Range    Ventricular Rate 108 BPM    Atrial Rate 108 BPM    P-R Interval 132 ms    QRS Duration 84 ms    Q-T Interval 388 ms    QTC Calculation (Bezet) 519 ms    Calculated P Axis 78 degrees    Calculated R Axis 67 degrees    Calculated T Axis 65 degrees    Diagnosis       Sinus tachycardia  Possible Left atrial enlargement  Left ventricular hypertrophy  Abnormal ECG  When compared with ECG of 08-JAN-2021 11:35,  No significant change was found     SARS-COV-2    Collection Time: 01/08/21  7:50 PM   Result Value Ref Range    Specimen source Nasopharyngeal      COVID-19 rapid test Not detected NOTD      Specimen type NP Swab         EKG interpretation by ED Physician: Sinus rhythm rate of 100 bpm. LVH. X-Ray, CT or other radiology findings or impressions:  XR CHEST PORT   Final Result   IMPRESSION:       No acute findings. Progress notes, Consult notes or additional Procedure notes:     Reevaluation of patient:   I have reassessed the patient. Patient is feeling a little bit better. Her respiratory rate is improved and work of breathing is decreased. Patient states that she does not feel ready to go home yet, however would like a couple more breathing treatments before making decision for admission. Patient care transferred to incoming physician, Rob Lake, pending reevaluation after additional breathing treatments and disposition. Disposition:  Diagnosis:   1. Long QT interval    2. Acute exacerbation of chronic obstructive pulmonary disease (COPD) ([facility]        Disposition: Pending    Follow-up Information    None          Patient's Medications   Start Taking    No medications on file   Continue Taking    ALBUTEROL (ACCUNEB) 1.25 MG/3 ML NEBU    Take 3 mL by inhalation every four (4) hours as needed for Wheezing (wheezing).  Indications: bronchospasm prevention    ALBUTEROL (PROVENTIL HFA, VENTOLIN HFA, PROAIR HFA) 90 MCG/ACTUATION INHALER    Take 2 Puffs by inhalation every four (4) hours as needed for Wheezing or Shortness of Breath. Indications: bronchospasm prevention    AMLODIPINE (NORVASC) 10 MG TABLET    Take 1 Tab by mouth daily for 30 days. Indications: high blood pressure    ASPIRIN DELAYED-RELEASE 81 MG TABLET    Take 1 Tab by mouth daily. ATORVASTATIN (LIPITOR) 20 MG TABLET    Take 1 Tab by mouth nightly. CITALOPRAM (CELEXA) 20 MG TABLET    Take 1 Tab by mouth every morning. CLONIDINE HCL (CATAPRES) 0.2 MG TABLET    Take 0.2 mg by mouth three (3) times daily. CLONIDINE HCL (CATAPRES) 0.2 MG TABLET    Take 1 Tab by mouth three (3) times daily for 30 days. FAMOTIDINE (PEPCID) 20 MG TABLET    Take 1 Tab by mouth daily. FLUTICASONE FUROATE-VILANTEROL (BREO ELLIPTA) 200-25 MCG/DOSE INHALER    Take 1 Puff by inhalation daily. Indications: controller medication for asthma    LISINOPRIL (PRINIVIL) 20 MG TABLET    Take 1 Tab by mouth daily for 30 days. MONTELUKAST (SINGULAIR) 10 MG TABLET    Take 1 Tab by mouth nightly. Indications: controller medication for asthma    NALOXONE (NARCAN) 4 MG/ACTUATION NASAL SPRAY    Use 1 spray intranasally, then discard. Repeat with new spray every 2 min as needed for opioid overdose symptoms, alternating nostrils. NITROGLYCERIN (NITROSTAT) 0.4 MG SL TABLET    Take 1 Tab by mouth every five (5) minutes as needed for Chest Pain. Sit/Lay down then put one tab under the tongue every 5 minutes as needed for chest pain for 3 doses    OXYGEN-AIR DELIVERY SYSTEMS    3 L by IntraNASal route as needed for Other (sob). ROFLUMILAST (DALIRESP) 500 MCG TAB TABLET    Take 1 Tab by mouth daily.    These Medications have changed    No medications on file   Stop Taking    No medications on file

## 2021-01-08 NOTE — CONSULTS
[facility] - Consult Note    Consultation request by Dr. Smith for increased QT and dyspnea  Date of  Admission: 1/8/2021  1:31 AM   Primary Care Physician:  Chalo Kearney MD     Assessment:     -Prolonged QTc up to 547 msec, no syncope.  Last dose of methadone 1/7/20 AM.  Patient denies any other illicit drugs.  -Hypokalemia at 3.4 at presentation  -Acute on chronic dyspnea.  Mostly COPD with only mild diastolic heart failure.  -Indeterminate troponin, not consistent with ACS  -Poorly controlled hypertension.  Would aggressively treat.  -h/o extensive tobacco use, quit November 2020  -Acute on chronic COPD exacerbation.  -h/o transient left bundle branch block.  This has been present in the past.  -Dyslipidemia  -History of prolonged heroin abuse.  Patient states she was a daily user up until last year since starting on a methadone program.   -h/o heart cath March 2020, normal coronaries  -Echo 10/2020 with normal EF, PAP 25 mmHg    · Primary cardiologist, [facility] seen by Dr. Humphrey 12/2020      Plan:     Discussed with Dr. Garcia at bedside.  QT prolongation possibly worsened from history of methadone (although last dose yesterday morning), hypokalemia, and possible additional medications that are not known, plan to check UDS.    Her breathing seems worse and BP uncontrolled, did not take her AM meds.  Plan to monitor on tele, followup EKG tonight and in AM, IV lasix x 1, and control BP.      Given cath and echo last year, I see no reason to repeat ischemia evaluation or obtain echo.     History of Present Illness:     This is a 55 y.o. female admitted for Long QT interval [R94.31].    Patient complains of:    Increasing dyspnea for past day.  Coughing with yellow-green mucous.  Chest pain only with deep breaths.  Chronic edema.  Occasional palpitations and dizziness but no LOC.    She was treated for COPD in ER and during evaluation QTc continued to increase.  No new medications per patient.  No OTC herbal medications.     Review of Symptoms:  Except as stated above include:  Constitutional:  Negative  Ears, nose, throat:  Negative  Respiratory:  Dyspnea, productive cough  Cardiovascular:  Pleuritic chest pain  Gastrointestinal: negative  Genitourinary:  negative  Musculoskeletal: Mild edema  Neurological:  Negative  Dermatological:  Negative  Hematological: Negative  Endocrinological: Negative  Allergy: Negative  Psychological:  Negative       Past Medical History:     Past Medical History:   Diagnosis Date    CHF (congestive heart failure) ([address]     Chronic respiratory failure with hypoxia ([address] 9/7/2020    CKD (chronic kidney disease) stage 3, GFR 30-59 ml/min 10/31/2019    Cocaine abuse ([address] 11/26/2017    COPD (chronic obstructive pulmonary disease) with chronic bronchitis ([address] 12/19/2017    Dependence on supplemental oxygen     Depression 3/22/2020    Drug-seeking behavior 11/19/2016    Endocrine disease     thyroid issues    Essential hypertension 3/10/2017    Fe deficiency anemia 10/27/2012    Fibromyalgia 12/16/2016    Gastroesophageal reflux disease without esophagitis 10/10/2016    Gastrointestinal disorder     \"blockage in my stomach\"    Hypercholesterolemia     Hypertension     Hypertensive heart failure ([address] 12/19/2017    Morbid obesity due to excess calories ([address] 3/10/2017    NSTEMI (non-ST elevated myocardial infarction) ([address] 3/22/2020    On home O2 12/3/2015    Overview:  2L at home per pt for approx 8 years    Polysubstance abuse ([address] 9/15/2018    Respiratory failure ([address] 1/11/2017    S/P hernia repair 10/31/2019    Sleep apnea 12/19/2017    T wave inversion in EKG 3/9/2019    Tobacco use 7/38/1730    Uncomplicated severe persistent asthma 12/13/2016    Vocal cord disease 12/7/2016         Social History:     Social History     Socioeconomic History    Marital status: SINGLE     Spouse name: Not on file    Number of children: Not on file    Years of education: Not on file    Highest education level: Not on file   Tobacco Use    Smoking status: Former Smoker     Packs/day: 0.25    Smokeless tobacco: Current User    Tobacco comment: Pt states she has not had money to buy cigarettes in the past month   Substance and Sexual Activity    Alcohol use: No     Comment: socially    Drug use: Not Currently     Types: Heroin     Comment: pt reports using approximately a month ago    Sexual activity: Not Currently     Comment: last used 9 years ago        Family History:   History reviewed. No pertinent family history. Medications:   No Known Allergies     Current Facility-Administered Medications   Medication Dose Route Frequency    magnesium sulfate 2 g/50 ml IVPB (premix or compounded)  2 g IntraVENous ONCE    potassium chloride (K-DUR, KLOR-CON) SR tablet 40 mEq  40 mEq Oral NOW     Current Outpatient Medications   Medication Sig    albuterol (PROVENTIL HFA, VENTOLIN HFA, PROAIR HFA) 90 mcg/actuation inhaler Take 2 Puffs by inhalation every four (4) hours as needed for Wheezing or Shortness of Breath. Indications: bronchospasm prevention    nitroglycerin (NITROSTAT) 0.4 mg SL tablet Take 1 Tab by mouth every five (5) minutes as needed for Chest Pain. Sit/Lay down then put one tab under the tongue every 5 minutes as needed for chest pain for 3 doses    aspirin delayed-release 81 mg tablet Take 1 Tab by mouth daily.  amLODIPine (NORVASC) 10 mg tablet Take 1 Tab by mouth daily for 30 days. Indications: high blood pressure    lisinopriL (PriniviL) 20 mg tablet Take 1 Tab by mouth daily for 30 days.  cloNIDine HCL (CATAPRES) 0.2 mg tablet Take 1 Tab by mouth three (3) times daily for 30 days.  fluticasone furoate-vilanteroL (Breo Ellipta) 200-25 mcg/dose inhaler Take 1 Puff by inhalation daily. Indications: controller medication for asthma    montelukast (SINGULAIR) 10 mg tablet Take 1 Tab by mouth nightly.  Indications: controller medication for asthma    roflumilast (DALIRESP) 500 mcg tab tablet Take 1 Tab by mouth daily.  albuterol (ACCUNEB) 1.25 mg/3 mL nebu Take 3 mL by inhalation every four (4) hours as needed for Wheezing (wheezing). Indications: bronchospasm prevention    citalopram (CeleXA) 20 mg tablet Take 1 Tab by mouth every morning.  cloNIDine HCL (CATAPRES) 0.2 mg tablet Take 0.2 mg by mouth three (3) times daily.  famotidine (PEPCID) 20 mg tablet Take 1 Tab by mouth daily. (Patient taking differently: Take 20 mg by mouth daily as needed.)    OXYGEN-AIR DELIVERY SYSTEMS 3 L by IntraNASal route as needed for Other (sob).  atorvastatin (LIPITOR) 20 mg tablet Take 1 Tab by mouth nightly.  naloxone (NARCAN) 4 mg/actuation nasal spray Use 1 spray intranasally, then discard. Repeat with new spray every 2 min as needed for opioid overdose symptoms, alternating nostrils. Physical Exam:     Visit Vitals  BP (!) 195/49   Pulse (!) 109   Temp 98.1 °F (36.7 °C)   Resp 24   Wt 69.9 kg (154 lb)   SpO2 97%   BMI 31.10 kg/m²     BP Readings from Last 3 Encounters:   01/08/21 (!) 195/49   12/31/20 (!) 161/81   12/30/20 (!) 116/44     Pulse Readings from Last 3 Encounters:   01/08/21 (!) 109   12/31/20 79   12/30/20 79     Wt Readings from Last 3 Encounters:   01/08/21 69.9 kg (154 lb)   12/30/20 69.9 kg (154 lb)   12/28/20 69.9 kg (154 lb)       General:  alert, cooperative, no distress, appears stated age  Neck:  nontender  Lungs:  decreased  Heart:  regular rate and rhythm, S1, S2 normal, no murmur, click, rub or gallop  Abdomen:  abdomen is soft without significant tenderness, masses, organomegaly or guarding  Extremities:  extremities normal, atraumatic, no cyanosis or edema  Skin: Warm and dry.  no hyperpigmentation, vitiligo, or suspicious lesions  Neuro: alert, oriented x3, affect appropriate, no focal neurological deficits, moves all extremities well, no involuntary movements, reflexes at knee and ankle intact  Psych: non focal     Data Review:     Recent Labs     01/08/21  0156   WBC 11.1   HGB 12.5   HCT 38.4        Recent Labs     01/08/21  0156      K 3.4*      CO2 30   *   BUN 17   CREA 1.30   CA 8.3*   MG 1.9   ALB 3.1*   ALT 19       Results for orders placed or performed during the hospital encounter of 01/08/21   EKG, 12 LEAD, INITIAL   Result Value Ref Range    Ventricular Rate 100 BPM    Atrial Rate 100 BPM    P-R Interval 134 ms    QRS Duration 86 ms    Q-T Interval 404 ms    QTC Calculation (Bezet) 521 ms    Calculated P Axis 63 degrees    Calculated R Axis 61 degrees    Calculated T Axis 18 degrees    Diagnosis       Normal sinus rhythm  Possible Left atrial enlargement  Left ventricular hypertrophy  Cannot rule out Septal infarct , age undetermined  Prolonged QT  Abnormal ECG  When compared with ECG of 31-DEC-2020 04:30,  Nonspecific T wave abnormality now evident in Inferior leads  QT has lengthened         All Cardiac Markers in the last 24 hours:    Lab Results   Component Value Date/Time    CPK 47 01/08/2021 05:23 AM    CPK 48 01/08/2021 01:56 AM    CKMB 2.0 01/08/2021 05:23 AM    CKMB 1.9 01/08/2021 01:56 AM    CKND1 4.3 (H) 01/08/2021 05:23 AM    CKND1 4.0 01/08/2021 01:56 AM    TROIQ 0.02 01/08/2021 05:23 AM    TROIQ 0.03 01/08/2021 01:56 AM       Last Lipid:  No results found for: CHOL, CHOLX, CHLST, CHOLV, HDL, HDLP, LDL, LDLC, DLDLP, TGLX, TRIGL, TRIGP, CHHD, CHHDX    Signed By: Abundio Carr MD     January 8, 2021

## 2021-01-09 ENCOUNTER — APPOINTMENT (OUTPATIENT)
Dept: GENERAL RADIOLOGY | Age: 56
DRG: 139 | End: 2021-01-09
Attending: EMERGENCY MEDICINE
Payer: MEDICAID

## 2021-01-09 ENCOUNTER — HOSPITAL ENCOUNTER (INPATIENT)
Age: 56
LOS: 9 days | Discharge: HOME | DRG: 139 | End: 2021-01-18
Attending: EMERGENCY MEDICINE | Admitting: HOSPITALIST
Payer: MEDICAID

## 2021-01-09 DIAGNOSIS — J44.1 ACUTE EXACERBATION OF COPD WITH ASTHMA (HCC): ICD-10-CM

## 2021-01-09 DIAGNOSIS — J96.11 CHRONIC RESPIRATORY FAILURE WITH HYPOXIA (HCC): ICD-10-CM

## 2021-01-09 DIAGNOSIS — J44.1 ACUTE EXACERBATION OF CHRONIC OBSTRUCTIVE PULMONARY DISEASE (COPD) (HCC): Primary | ICD-10-CM

## 2021-01-09 DIAGNOSIS — R06.03 RESPIRATORY DISTRESS: ICD-10-CM

## 2021-01-09 DIAGNOSIS — F17.218 CIGARETTE NICOTINE DEPENDENCE WITH OTHER NICOTINE-INDUCED DISORDER: ICD-10-CM

## 2021-01-09 DIAGNOSIS — I50.20 SYSTOLIC CONGESTIVE HEART FAILURE, UNSPECIFIED HF CHRONICITY (HCC): ICD-10-CM

## 2021-01-09 DIAGNOSIS — R06.03 ACUTE RESPIRATORY DISTRESS: ICD-10-CM

## 2021-01-09 DIAGNOSIS — F14.10 COCAINE ABUSE (HCC): ICD-10-CM

## 2021-01-09 DIAGNOSIS — Z99.81 ON HOME O2: ICD-10-CM

## 2021-01-09 DIAGNOSIS — R00.0 TACHYCARDIA: ICD-10-CM

## 2021-01-09 DIAGNOSIS — J45.901 ACUTE EXACERBATION OF COPD WITH ASTHMA (HCC): ICD-10-CM

## 2021-01-09 DIAGNOSIS — E78.5 HYPERLIPIDEMIA, UNSPECIFIED HYPERLIPIDEMIA TYPE: ICD-10-CM

## 2021-01-09 DIAGNOSIS — F32.A DEPRESSION, UNSPECIFIED DEPRESSION TYPE: ICD-10-CM

## 2021-01-09 DIAGNOSIS — F19.10 POLYSUBSTANCE ABUSE (HCC): ICD-10-CM

## 2021-01-09 DIAGNOSIS — I11.0 HYPERTENSIVE HEART FAILURE (HCC): ICD-10-CM

## 2021-01-09 DIAGNOSIS — Z20.822 SUSPECTED COVID-19 VIRUS INFECTION: ICD-10-CM

## 2021-01-09 LAB
ANION GAP SERPL CALC-SCNC: 7 MMOL/L (ref 3–18)
ATRIAL RATE: 108 BPM
BASOPHILS # BLD: 0 K/UL (ref 0–0.06)
BASOPHILS NFR BLD: 0 % (ref 0–3)
BUN SERPL-MCNC: 18 MG/DL (ref 7–18)
BUN/CREAT SERPL: 13 (ref 12–20)
CALCIUM SERPL-MCNC: 9 MG/DL (ref 8.5–10.1)
CALCULATED P AXIS, ECG09: 78 DEGREES
CALCULATED R AXIS, ECG10: 67 DEGREES
CALCULATED T AXIS, ECG11: 65 DEGREES
CHLORIDE SERPL-SCNC: 106 MMOL/L (ref 100–111)
CK MB CFR SERPL CALC: 3.8 % (ref 0–4)
CK MB SERPL-MCNC: 2.5 NG/ML (ref 5–25)
CK SERPL-CCNC: 65 U/L (ref 26–192)
CO2 SERPL-SCNC: 28 MMOL/L (ref 21–32)
CREAT SERPL-MCNC: 1.35 MG/DL (ref 0.6–1.3)
DIAGNOSIS, 93000: NORMAL
DIFFERENTIAL METHOD BLD: ABNORMAL
EOSINOPHIL # BLD: 0 K/UL (ref 0–0.4)
EOSINOPHIL NFR BLD: 0 % (ref 0–5)
ERYTHROCYTE [DISTWIDTH] IN BLOOD BY AUTOMATED COUNT: 13.2 % (ref 11.6–14.5)
GLUCOSE SERPL-MCNC: 146 MG/DL (ref 74–99)
HCT VFR BLD AUTO: 39.4 % (ref 35–45)
HGB BLD-MCNC: 13 G/DL (ref 12–16)
LYMPHOCYTES # BLD: 1.1 K/UL (ref 0.8–3.5)
LYMPHOCYTES NFR BLD: 6 % (ref 20–51)
MCH RBC QN AUTO: 29.7 PG (ref 24–34)
MCHC RBC AUTO-ENTMCNC: 33 G/DL (ref 31–37)
MCV RBC AUTO: 90.2 FL (ref 74–97)
MONOCYTES # BLD: 0.5 K/UL (ref 0–1)
MONOCYTES NFR BLD: 3 % (ref 2–9)
NEUTS BAND NFR BLD MANUAL: 3 % (ref 0–5)
NEUTS SEG # BLD: 16.3 K/UL (ref 1.8–8)
NEUTS SEG NFR BLD: 88 % (ref 42–75)
P-R INTERVAL, ECG05: 132 MS
PLATELET # BLD AUTO: 274 K/UL (ref 135–420)
PLATELET COMMENTS,PCOM: ABNORMAL
PMV BLD AUTO: 11 FL (ref 9.2–11.8)
POTASSIUM SERPL-SCNC: 3.8 MMOL/L (ref 3.5–5.5)
Q-T INTERVAL, ECG07: 388 MS
QRS DURATION, ECG06: 84 MS
QTC CALCULATION (BEZET), ECG08: 519 MS
RBC # BLD AUTO: 4.37 M/UL (ref 4.2–5.3)
RBC MORPH BLD: ABNORMAL
SODIUM SERPL-SCNC: 141 MMOL/L (ref 136–145)
TROPONIN I SERPL-MCNC: <0.02 NG/ML (ref 0–0.04)
VENTRICULAR RATE, ECG03: 108 BPM
WBC # BLD AUTO: 17.9 K/UL (ref 4.6–13.2)

## 2021-01-09 PROCEDURE — 94640 AIRWAY INHALATION TREATMENT: CPT

## 2021-01-09 PROCEDURE — 96374 THER/PROPH/DIAG INJ IV PUSH: CPT

## 2021-01-09 PROCEDURE — 85025 COMPLETE CBC W/AUTO DIFF WBC: CPT

## 2021-01-09 PROCEDURE — 96372 THER/PROPH/DIAG INJ SC/IM: CPT

## 2021-01-09 PROCEDURE — 74011250637 HC RX REV CODE- 250/637: Performed by: FAMILY MEDICINE

## 2021-01-09 PROCEDURE — 74011000250 HC RX REV CODE- 250

## 2021-01-09 PROCEDURE — 99218 HC RM OBSERVATION: CPT

## 2021-01-09 PROCEDURE — 74011000250 HC RX REV CODE- 250: Performed by: NURSE PRACTITIONER

## 2021-01-09 PROCEDURE — 74011250636 HC RX REV CODE- 250/636: Performed by: EMERGENCY MEDICINE

## 2021-01-09 PROCEDURE — 74011250636 HC RX REV CODE- 250/636: Performed by: HOSPITALIST

## 2021-01-09 PROCEDURE — 74011000250 HC RX REV CODE- 250: Performed by: EMERGENCY MEDICINE

## 2021-01-09 PROCEDURE — 71045 X-RAY EXAM CHEST 1 VIEW: CPT

## 2021-01-09 PROCEDURE — 93005 ELECTROCARDIOGRAM TRACING: CPT

## 2021-01-09 PROCEDURE — 99285 EMERGENCY DEPT VISIT HI MDM: CPT

## 2021-01-09 PROCEDURE — 65660000000 HC RM CCU STEPDOWN

## 2021-01-09 PROCEDURE — 74011250636 HC RX REV CODE- 250/636: Performed by: NURSE PRACTITIONER

## 2021-01-09 PROCEDURE — 96376 TX/PRO/DX INJ SAME DRUG ADON: CPT

## 2021-01-09 PROCEDURE — 94660 CPAP INITIATION&MGMT: CPT

## 2021-01-09 PROCEDURE — 82553 CREATINE MB FRACTION: CPT

## 2021-01-09 PROCEDURE — 96375 TX/PRO/DX INJ NEW DRUG ADDON: CPT

## 2021-01-09 PROCEDURE — 74011250637 HC RX REV CODE- 250/637: Performed by: NURSE PRACTITIONER

## 2021-01-09 PROCEDURE — 99223 1ST HOSP IP/OBS HIGH 75: CPT | Performed by: HOSPITALIST

## 2021-01-09 PROCEDURE — 80048 BASIC METABOLIC PNL TOTAL CA: CPT

## 2021-01-09 PROCEDURE — 96365 THER/PROPH/DIAG IV INF INIT: CPT

## 2021-01-09 RX ORDER — IPRATROPIUM BROMIDE AND ALBUTEROL SULFATE 2.5; .5 MG/3ML; MG/3ML
3 SOLUTION RESPIRATORY (INHALATION) ONCE
Status: COMPLETED | OUTPATIENT
Start: 2021-01-09 | End: 2021-01-09

## 2021-01-09 RX ORDER — GUAIFENESIN 600 MG/1
600 TABLET, EXTENDED RELEASE ORAL EVERY 12 HOURS
Status: DISCONTINUED | OUTPATIENT
Start: 2021-01-09 | End: 2021-01-09 | Stop reason: HOSPADM

## 2021-01-09 RX ORDER — IPRATROPIUM BROMIDE AND ALBUTEROL SULFATE 2.5; .5 MG/3ML; MG/3ML
SOLUTION RESPIRATORY (INHALATION)
Status: COMPLETED
Start: 2021-01-09 | End: 2021-01-09

## 2021-01-09 RX ORDER — DIPHENHYDRAMINE HYDROCHLORIDE 50 MG/ML
25 INJECTION, SOLUTION INTRAMUSCULAR; INTRAVENOUS ONCE
Status: COMPLETED | OUTPATIENT
Start: 2021-01-09 | End: 2021-01-09

## 2021-01-09 RX ADMIN — HEPARIN SODIUM 5000 UNITS: 5000 INJECTION INTRAVENOUS; SUBCUTANEOUS at 01:58

## 2021-01-09 RX ADMIN — METHYLPREDNISOLONE SODIUM SUCCINATE 125 MG: 125 INJECTION, POWDER, FOR SOLUTION INTRAMUSCULAR; INTRAVENOUS at 16:36

## 2021-01-09 RX ADMIN — GUAIFENESIN 600 MG: 600 TABLET, EXTENDED RELEASE ORAL at 05:01

## 2021-01-09 RX ADMIN — Medication 10 ML: at 05:01

## 2021-01-09 RX ADMIN — IPRATROPIUM BROMIDE AND ALBUTEROL SULFATE 3 ML: .5; 3 SOLUTION RESPIRATORY (INHALATION) at 16:35

## 2021-01-09 RX ADMIN — AZITHROMYCIN DIHYDRATE 500 MG: 500 INJECTION, POWDER, LYOPHILIZED, FOR SOLUTION INTRAVENOUS at 16:36

## 2021-01-09 RX ADMIN — DIPHENHYDRAMINE HYDROCHLORIDE 25 MG: 50 INJECTION, SOLUTION INTRAMUSCULAR; INTRAVENOUS at 19:27

## 2021-01-09 RX ADMIN — ALBUTEROL SULFATE 1.25 MG: 1.25 SOLUTION RESPIRATORY (INHALATION) at 03:15

## 2021-01-09 RX ADMIN — Medication 10 ML: at 01:59

## 2021-01-09 RX ADMIN — METHYLPREDNISOLONE SODIUM SUCCINATE 20 MG: 40 INJECTION, POWDER, FOR SOLUTION INTRAMUSCULAR; INTRAVENOUS at 05:01

## 2021-01-09 RX ADMIN — ACETAMINOPHEN 650 MG: 325 TABLET ORAL at 05:01

## 2021-01-09 RX ADMIN — IPRATROPIUM BROMIDE AND ALBUTEROL SULFATE 3 ML: 2.5; .5 SOLUTION RESPIRATORY (INHALATION) at 16:35

## 2021-01-09 NOTE — ED NOTES
TRANSFER - OUT REPORT:    Verbal [address]  being transferred to [facility] (unit) for routine progression of care       Report consisted of patients Situation, Background, Assessment and   Recommendations(SBAR). Information from the following report(s) SBAR, ED Summary and MAR was reviewed with the receiving nurse. Lines:   Peripheral IV 01/08/21 Right Forearm (Active)   Site Assessment Clean, dry, & intact 01/08/21 1627   Phlebitis Assessment 0 01/08/21 1627   Infiltration Assessment 0 01/08/21 1627   Dressing Status Clean, dry, & intact 01/08/21 1627   Dressing Type Transparent 01/08/21 1627   Hub Color/Line Status Pink;Flushed;Patent 01/08/21 1627        Opportunity for questions and clarification was provided.       Patient transported with:  Pt items and belongings

## 2021-01-09 NOTE — ED NOTES
Per medics, patient have SOB and says her medication isnt working. Gave 2nitrate in field and 1 before getting out of ambulance to help blood pressure. Was 260/120 went down to 230/110 after nitrates.

## 2021-01-09 NOTE — Clinical Note
Status[de-identified] INPATIENT [101]   Type of Bed: Telemetry [19]   Inpatient Hospitalization Certified Necessary for the Following Reasons: 3.  Patient receiving treatment that can only be provided in an inpatient setting (further clarification in H&P documentation)   Admitting Diagnosis: Acute exacerbation of chronic obstructive pulmonary disease (COPD) [facility]) [200219]   Admitting Physician: Julia Ram [[id number]]   Attending Physician: Julia Ram [[id number]]   Estimated Length of Stay: 2 Midnights   Discharge Plan[de-identified] Home with Office Follow-up

## 2021-01-09 NOTE — PROGRESS NOTES
Pt angry irritable/aggitated/demanding/hostile; pt also very profane. Pt states she is going home to \"snort my dope because it helps me to breath better, then I'll go back to the ER''. Pt going AMA. Pt escorted to front entrance.

## 2021-01-09 NOTE — ED NOTES
Assumed care of patient from [address] self to pt  Pt alert and oriented x 4  Pt was cursing and shouting and banging on table   Pt threw trash and drinks  Pt demanded to be off monitor   Informed charge nurse  Pt lung sounds clear   Pt given new coffee and had room cleaned  Positioned in bed for comfort  Will continue to monitor and assess

## 2021-01-09 NOTE — PROGRESS NOTES
PT orders received, chart reviewed. Pt unable to participate with PT due to:    -Pt left AMA prior to PT evaluation.      Thank you for this referral.  Henrene Kanner, PT, DPT

## 2021-01-09 NOTE — ED PROVIDER NOTES
[facility]  [facility] EMERGENCY DEPT      4:14 PM    Date: 1/9/2021  Patient Name: Margie Saldivar    History of Presenting Illness     No chief complaint on file. 63 y.o. female with noted past medical history who presents to the emergency department with shortness of breath and respiratory distress. Patient is a known COPD patient with prior significant frequent visits for acute COPD exacerbations to the emergency department. Per the patient she was admitted for 3 days and was discharged from [facility] this morning. After she got home she got worsening shortness of breath and respiratory distress and called fire rescue. Upon fire rescue arrival evaluation the patient had decreased air movement and she was placed on CPAP. By time she arrived in the ER she was no longer in severe respiratory stress and she was awake alert on CPAP. Patient is in moderate respiratory stress on initial MD exam.    Patient denies any other associated signs or symptoms. Patient denies any other complaints. Nursing notes regarding the HPI and triage nursing notes were reviewed. Prior medical records were reviewed. Current Outpatient Medications   Medication Sig Dispense Refill    albuterol (PROVENTIL HFA, VENTOLIN HFA, PROAIR HFA) 90 mcg/actuation inhaler Take 2 Puffs by inhalation every four (4) hours as needed for Wheezing or Shortness of Breath. Indications: bronchospasm prevention 1 Inhaler 4    nitroglycerin (NITROSTAT) 0.4 mg SL tablet Take 1 Tab by mouth every five (5) minutes as needed for Chest Pain. Sit/Lay down then put one tab under the tongue every 5 minutes as needed for chest pain for 3 doses 1 Bottle 0    aspirin delayed-release 81 mg tablet Take 1 Tab by mouth daily. 30 Tab 0    amLODIPine (NORVASC) 10 mg tablet Take 1 Tab by mouth daily for 30 days. Indications: high blood pressure 30 Tab 0    lisinopriL (PriniviL) 20 mg tablet Take 1 Tab by mouth daily for 30 days.  30 Tab 0    cloNIDine HCL (CATAPRES) 0.2 mg tablet Take 1 Tab by mouth three (3) times daily for 30 days. 90 Tab 0    fluticasone furoate-vilanteroL (Breo Ellipta) 200-25 mcg/dose inhaler Take 1 Puff by inhalation daily. Indications: controller medication for asthma 1 Inhaler 3    montelukast (SINGULAIR) 10 mg tablet Take 1 Tab by mouth nightly. Indications: controller medication for asthma 30 Tab 1    roflumilast (DALIRESP) 500 mcg tab tablet Take 1 Tab by mouth daily. 30 Tab 3    albuterol (ACCUNEB) 1.25 mg/3 mL nebu Take 3 mL by inhalation every four (4) hours as needed for Wheezing (wheezing). Indications: bronchospasm prevention 25 Each 5    citalopram (CeleXA) 20 mg tablet Take 1 Tab by mouth every morning. 30 Tab 0    cloNIDine HCL (CATAPRES) 0.2 mg tablet Take 0.2 mg by mouth three (3) times daily.  famotidine (PEPCID) 20 mg tablet Take 1 Tab by mouth daily. (Patient taking differently: Take 20 mg by mouth daily as needed.) 30 Tab 1    OXYGEN-AIR DELIVERY SYSTEMS 3 L by IntraNASal route as needed for Other (sob).  atorvastatin (LIPITOR) 20 mg tablet Take 1 Tab by mouth nightly. 30 Tab 0    naloxone (NARCAN) 4 mg/actuation nasal spray Use 1 spray intranasally, then discard. Repeat with new spray every 2 min as needed for opioid overdose symptoms, alternating nostrils.  1 Each 0       Past History     Past Medical History:  Past Medical History:   Diagnosis Date    CHF (congestive heart failure) (HCC)     Chronic respiratory failure with hypoxia ([address] 9/7/2020    CKD (chronic kidney disease) stage 3, GFR 30-59 ml/min 10/31/2019    Cocaine abuse ([address] 11/26/2017    COPD (chronic obstructive pulmonary disease) with chronic bronchitis ([address] 12/19/2017    Dependence on supplemental oxygen     Depression 3/22/2020    Drug-seeking behavior 11/19/2016    Endocrine disease     thyroid issues    Essential hypertension 3/10/2017    Fe deficiency anemia 10/27/2012    Fibromyalgia 12/16/2016    Gastroesophageal reflux disease without esophagitis 10/10/2016    Gastrointestinal disorder     \"blockage in my stomach\"    Hypercholesterolemia     Hypertension     Hypertensive heart failure ([address] 12/19/2017    Morbid obesity due to excess calories ([address] 3/10/2017    NSTEMI (non-ST elevated myocardial infarction) ([address] 3/22/2020    On home O2 12/3/2015    Overview:  2L at home per pt for approx 8 years    Polysubstance abuse ([address] 9/15/2018    Respiratory failure ([address] 1/11/2017    S/P hernia repair 10/31/2019    Sleep apnea 12/19/2017    T wave inversion in EKG 3/9/2019    Tobacco use 3/14/1411    Uncomplicated severe persistent asthma 12/13/2016    Vocal cord disease 12/7/2016       Past Surgical History:  Past Surgical History:   Procedure Laterality Date    HX GI      Exploratory laparotomy with lysis of adhesions and primary repair of incarcerated umbilical hernia       Family History:  No family history on file. Social History:  Social History     Tobacco Use    Smoking status: Former Smoker     Packs/day: 0.25    Smokeless tobacco: Current User    Tobacco comment: Pt states she has not had money to buy cigarettes in the past month   Substance Use Topics    Alcohol use: No     Comment: socially    Drug use: Not Currently     Types: Heroin     Comment: pt reports using approximately a month ago       Allergies:  No Known Allergies    Patient's primary care provider (as noted in EPIC):  Lizz Wu MD    Review of Systems   Constitutional: Negative for diaphoresis. HENT: Negative for congestion. Eyes: Negative for discharge. Respiratory: Negative for stridor. Cardiovascular: Negative for palpitations. Gastrointestinal: Negative for diarrhea. Genitourinary: Negative for flank pain. Musculoskeletal: Negative for back pain. Neurological: Negative for weakness. Psychiatric/Behavioral: Negative for hallucinations.    All other systems reviewed and are negative. Visit Vitals  BP (!) 196/95 (BP 1 Location: Right arm, BP Patient Position: Sitting)   Pulse (!) 119   Resp 25   SpO2 100%       PHYSICAL EXAM:    CONSTITUTIONAL:  Alert, in no apparent distress;  well developed;  well nourished. HEAD:  Normocephalic, atraumatic. EYES:  EOMI. Non-icteric sclera. Normal conjunctiva. ENTM:  Nose:  no rhinorrhea. Throat:  no erythema or exudate, mucous membranes moist.  NECK:  No JVD. Supple    RESPIRATORY:  Diffuse whole expiratory wheeze with good air movement. CARDIOVASCULAR:  Regular rate and rhythm. No murmurs, rubs, or gallops. GI:  Normal bowel sounds, abdomen soft and non-tender. No rebound or guarding. BACK:  Non-tender. UPPER EXT:  Normal inspection. LOWER EXT:  No edema, no calf tenderness. Distal pulses intact. NEURO:  Moves all four extremities, and grossly normal motor exam.  SKIN:  No rashes;  Normal for age. PSYCH:  Alert and normal affect. DIFFERENTIAL DIAGNOSES/ MEDICAL DECISION MAKING:   Shortness of breath etiologies include chronic obstructive pulmonary disease (COPD), acute asthma exacerbation, congestive heart failure, pneumonia, acute bronchitis, pulmonary embolism, upper respiratory infection, cardiac event to include acute coronary syndrome, acute myocardial infarction or a combination of the above (ex URI on top of COPD thus causing respiratory distress).       Diagnostic Study Results     Abnormal lab results from this emergency department encounter:  Labs Reviewed   CBC WITH AUTOMATED DIFF - Abnormal; Notable for the following components:       Result Value    WBC 17.9 (*)     All other components within normal limits   METABOLIC PANEL, BASIC - Abnormal; Notable for the following components:    Glucose 146 (*)     Creatinine 1.35 (*)     GFR est AA 49 (*)     GFR est non-AA 41 (*)     All other components within normal limits   CARDIAC PANEL,(CK, CKMB & TROPONIN)   BLOOD GAS, ARTERIAL       Lab values for this patient within approximately the last 12 hours:  Recent Results (from the past 12 hour(s))   CBC WITH AUTOMATED DIFF    Collection Time: 01/09/21  4:49 PM   Result Value Ref Range    WBC 17.9 (H) 4.6 - 13.2 K/uL    RBC 4.37 4.20 - 5.30 M/uL    HGB 13.0 12.0 - 16.0 g/dL    HCT 39.4 35.0 - 45.0 %    MCV 90.2 74.0 - 97.0 FL    MCH 29.7 24.0 - 34.0 PG    MCHC 33.0 31.0 - 37.0 g/dL    RDW 13.2 11.6 - 14.5 %    PLATELET 978 491 - 981 K/uL    MPV 11.0 9.2 - 11.8 FL    NEUTROPHILS PENDING %    LYMPHOCYTES PENDING %    MONOCYTES PENDING %    EOSINOPHILS PENDING %    BASOPHILS PENDING %    ABS. NEUTROPHILS PENDING K/UL    ABS. LYMPHOCYTES PENDING K/UL    ABS. MONOCYTES PENDING K/UL    ABS. EOSINOPHILS PENDING K/UL    ABS. BASOPHILS PENDING K/UL    DF PENDING    METABOLIC PANEL, BASIC    Collection Time: 01/09/21  4:49 PM   Result Value Ref Range    Sodium 141 136 - 145 mmol/L    Potassium 3.8 3.5 - 5.5 mmol/L    Chloride 106 100 - 111 mmol/L    CO2 28 21 - 32 mmol/L    Anion gap 7 3.0 - 18 mmol/L    Glucose 146 (H) 74 - 99 mg/dL    BUN 18 7.0 - 18 MG/DL    Creatinine 1.35 (H) 0.6 - 1.3 MG/DL    BUN/Creatinine ratio 13 12 - 20      GFR est AA 49 (L) >60 ml/min/1.73m2    GFR est non-AA 41 (L) >60 ml/min/1.73m2    Calcium 9.0 8.5 - 10.1 MG/DL   CARDIAC PANEL,(CK, CKMB & TROPONIN)    Collection Time: 01/09/21  4:49 PM   Result Value Ref Range    CK - MB 2.5 <3.6 ng/ml    CK-MB Index 3.8 0.0 - 4.0 %    CK 65 26 - 192 U/L    Troponin-I, QT <0.02 0.0 - 0.045 NG/ML       Radiologist and cardiologist interpretations if available at time of this note:  Xr Chest Port    Result Date: 1/9/2021  Portable Frontal Chest. CLINICAL HISTORY: Shortness of breath. TECHNIQUE: Single frontal view of the chest, obtained portably. COMPARISONS: 1/8/2020. FINDINGS:   Mild haziness in the left midlung field. Mild septal thickening right lung base. . The cardiomediastinal silhouette is unremarkable. Pulmonary vascularity is normal, however.   There are no effusions. IMPRESSION: Likely mild edema. Pneumonitis less likely. Correlate clinically and consider follow-up. Emergency physician interpretation of EKG: Normal sinus rhythm at 99 bpm with LVH. Portable (A-P view) CXR:  Preliminary review of x-rays by ED Physician. Interpretation of chest X-ray shows, no infiltrates, no pneumothorax, no CHF, no effusion. Check shows when compared to an x-ray done of the chest 1 day earlier on 1/8/2021. Medication(s) ordered for patient during this emergency visit encounter:  Medications   albuterol-ipratropium (DUO-NEB) 2.5 MG-0.5 MG/3 ML (3 mL Nebulization Given 1/9/21 1635)   albuterol-ipratropium (DUO-NEB) 2.5 MG-0.5 MG/3 ML (3 mL Nebulization Given 1/9/21 1635)   albuterol-ipratropium (DUO-NEB) 2.5 MG-0.5 MG/3 ML (3 mL Nebulization Given 1/9/21 1635)   methylPREDNISolone (PF) (Solu-MEDROL) injection 125 mg (125 mg IntraVENous Given 1/9/21 1636)   azithromycin (ZITHROMAX) 500 mg in 0.9% sodium chloride 250 mL (VIAL-MATE) (500 mg IntraVENous Given 1/9/21 1636)       Medical Decision Making     I am the first provider for this patient. I reviewed the vital signs, available nursing notes, past medical history, past surgical history, family history and social history. Vital Signs:  Reviewed the patient's vital signs. ED COURSE:      IMPRESSION AND MEDICAL DECISION MAKING:  Based upon the patient's presentation with noted HPI and PE, along with the work up done in the emergency department, I believe that the patient is having an acute COPD exacerbation. The patients respiratory status did NOT improve enough during the patients emergency department course with noted HHN treatments, steroids, and other noted modalities and medications for me to feel comfortable with discharge of the patient home. Therefore, will admit the patient for further pulmonary treatments and observation.         Critical Care Note:  Respiratory Distress    Critical care minutes: 40 MINUTES. Given patient's initial arrival in respiratory distress, numerous serial reevaluations of the patient's respiratory status and patient's response to various medical interventions. Given the patients underlying condition medical intervention(s) were needed, requiring numerous reevaluations of patient's vital signs and response to different emergency department therapies, total bedside time evaluating and/or treating the patient, not including procedures, is noted below. Admit to Hospitalist    The patient was presented to the accepting hospitalist, Dr. Rudi De La Vega. The patient's primary doctor is Erich Ellis MD, and admissions for this physician are with the hospitalist.  If the patient has no primary doctor, then admission is to the hospitalist as well. As the emergency physician, I wrote courtesy admission orders for the hospitalist physician. The courtesy orders included explicit instructions for the floor nursing staff to call the admitting attending physician upon patient arrival on the floor. As the emergency physician, I wrote courtesy admission orders for the accepting physician. The courtesy orders included explicit instructions for the floor nursing staff to call the admitting attending physician upon patient arrival on the floor. Coding Diagnoses     Clinical Impression:   1. Acute exacerbation of chronic obstructive pulmonary disease (COPD) ([facility]    2. Respiratory distress        Disposition     Disposition: Admit    Paul Rosado M.D.   DAVON Board Certified Emergency Physician    Provider Attestation:  If a scribe was utilized in generation of this patient record, I personally performed the services described in the documentation, reviewed the documentation, as recorded by the scribe in my presence, and it accurately records the patient's history of presenting illness, review of systems, patient physical examination, and procedures performed by me as the attending physician. Alena Lucero M.D.   DAVON Board Certified Emergency Physician  1/9/2021.  4:14 PM

## 2021-01-09 NOTE — ED NOTES
Knocked to ensure privacy  Introduced self to patient  Pt laying bed   Alert and oriented x 4   C/o cough pain   Pt handling secretions   No trouble swallowing   Explained how to use nebulizer  Pt understood  Lung sounds wheezy and rhonchi  Pt refused to be placed on monitor   Verified pt using 2 identifiers  Explained use and side effects  Medicated as ordered  Updated about plan of care    Will continue to monitor and assess

## 2021-01-09 NOTE — PROGRESS NOTES
Pt c/o anxiety; requesting xanax; Dr. Mihaela Rutherford notified, no new orders received. Pt declines to wear telemetry.

## 2021-01-09 NOTE — PROGRESS NOTES
05:08  Called by charge RNAshley, who stated was in the hallway screaming and cursing.  I contacted security to meet me on the unit.  Arrived on unit, patient in room with primary RN Kasia.  Upon knocking on door, patient states, who is it.  I introduced myself and patient screamed and cursed.  States she needs her breathing treatment.  Kasia states last treatment was at 3:15 and they are scheduled every 4 hours PRN.  Patient states she is leaving when informed the treatment would be given at 7:15.    05:17  Charge Ashley WAGGONER called supervisor to inform me the patient would now be staying.

## 2021-01-09 NOTE — ROUTINE PROCESS
TRANSFER - IN REPORT: 
 
Verbal report received from [address] on [address]  being received from [facility] ED for routine progression of care Report consisted of patients Situation, Background, Assessment and  
Recommendations(SBAR). Information from the following report(s) SBAR, Kardex, ED Summary, Intake/Output, MAR and Recent Results was reviewed with the receiving nurse. Opportunity for questions and clarification was provided. Assessment completed upon patients arrival to unit and care assumed. Primary Nurse Olamide Gómez RN and ROSALINE noble performed a dual skin assessment on this patient No impairment noted Darrell score is 23

## 2021-01-10 LAB
ALBUMIN SERPL-MCNC: 3 G/DL (ref 3.4–5)
ALBUMIN/GLOB SERPL: 0.8 {RATIO} (ref 0.8–1.7)
ALP SERPL-CCNC: 78 U/L (ref 45–117)
ALT SERPL-CCNC: 22 U/L (ref 13–56)
ANION GAP SERPL CALC-SCNC: 6 MMOL/L (ref 3–18)
AST SERPL-CCNC: 27 U/L (ref 10–38)
ATRIAL RATE: 99 BPM
BASOPHILS # BLD: 0 K/UL (ref 0–0.1)
BASOPHILS NFR BLD: 0 % (ref 0–2)
BILIRUB SERPL-MCNC: 0.3 MG/DL (ref 0.2–1)
BUN SERPL-MCNC: 23 MG/DL (ref 7–18)
BUN/CREAT SERPL: 18 (ref 12–20)
CALCIUM SERPL-MCNC: 8.7 MG/DL (ref 8.5–10.1)
CALCULATED P AXIS, ECG09: 60 DEGREES
CALCULATED R AXIS, ECG10: 38 DEGREES
CALCULATED T AXIS, ECG11: 65 DEGREES
CHLORIDE SERPL-SCNC: 106 MMOL/L (ref 100–111)
CO2 SERPL-SCNC: 27 MMOL/L (ref 21–32)
CREAT SERPL-MCNC: 1.27 MG/DL (ref 0.6–1.3)
DIAGNOSIS, 93000: NORMAL
DIFFERENTIAL METHOD BLD: ABNORMAL
EOSINOPHIL # BLD: 0 K/UL (ref 0–0.4)
EOSINOPHIL NFR BLD: 0 % (ref 0–5)
ERYTHROCYTE [DISTWIDTH] IN BLOOD BY AUTOMATED COUNT: 13.2 % (ref 11.6–14.5)
GLOBULIN SER CALC-MCNC: 3.7 G/DL (ref 2–4)
GLUCOSE SERPL-MCNC: 115 MG/DL (ref 74–99)
HCT VFR BLD AUTO: 34.1 % (ref 35–45)
HGB BLD-MCNC: 11 G/DL (ref 12–16)
LYMPHOCYTES # BLD: 1.1 K/UL (ref 0.9–3.6)
LYMPHOCYTES NFR BLD: 7 % (ref 21–52)
MAGNESIUM SERPL-MCNC: 2.2 MG/DL (ref 1.6–2.6)
MCH RBC QN AUTO: 28.9 PG (ref 24–34)
MCHC RBC AUTO-ENTMCNC: 32.3 G/DL (ref 31–37)
MCV RBC AUTO: 89.5 FL (ref 74–97)
MONOCYTES # BLD: 0.4 K/UL (ref 0.05–1.2)
MONOCYTES NFR BLD: 3 % (ref 3–10)
NEUTS SEG # BLD: 13.3 K/UL (ref 1.8–8)
NEUTS SEG NFR BLD: 90 % (ref 40–73)
P-R INTERVAL, ECG05: 122 MS
PLATELET # BLD AUTO: 224 K/UL (ref 135–420)
PMV BLD AUTO: 11.1 FL (ref 9.2–11.8)
POTASSIUM SERPL-SCNC: 4.5 MMOL/L (ref 3.5–5.5)
PROT SERPL-MCNC: 6.7 G/DL (ref 6.4–8.2)
Q-T INTERVAL, ECG07: 384 MS
QRS DURATION, ECG06: 78 MS
QTC CALCULATION (BEZET), ECG08: 492 MS
RBC # BLD AUTO: 3.81 M/UL (ref 4.2–5.3)
SODIUM SERPL-SCNC: 139 MMOL/L (ref 136–145)
VENTRICULAR RATE, ECG03: 99 BPM
WBC # BLD AUTO: 14.8 K/UL (ref 4.6–13.2)

## 2021-01-10 PROCEDURE — 65660000000 HC RM CCU STEPDOWN

## 2021-01-10 PROCEDURE — 74011000250 HC RX REV CODE- 250: Performed by: HOSPITALIST

## 2021-01-10 PROCEDURE — 74011250636 HC RX REV CODE- 250/636: Performed by: INTERNAL MEDICINE

## 2021-01-10 PROCEDURE — 96375 TX/PRO/DX INJ NEW DRUG ADDON: CPT

## 2021-01-10 PROCEDURE — 94660 CPAP INITIATION&MGMT: CPT

## 2021-01-10 PROCEDURE — 99232 SBSQ HOSP IP/OBS MODERATE 35: CPT | Performed by: INTERNAL MEDICINE

## 2021-01-10 PROCEDURE — 80053 COMPREHEN METABOLIC PANEL: CPT

## 2021-01-10 PROCEDURE — 5A09457 ASSISTANCE WITH RESPIRATORY VENTILATION, 24-96 CONSECUTIVE HOURS, CONTINUOUS POSITIVE AIRWAY PRESSURE: ICD-10-PCS | Performed by: PHYSICIAN ASSISTANT

## 2021-01-10 PROCEDURE — 96367 TX/PROPH/DG ADDL SEQ IV INF: CPT

## 2021-01-10 PROCEDURE — 83735 ASSAY OF MAGNESIUM: CPT

## 2021-01-10 PROCEDURE — 87426 SARSCOV CORONAVIRUS AG IA: CPT

## 2021-01-10 PROCEDURE — 74011250637 HC RX REV CODE- 250/637: Performed by: INTERNAL MEDICINE

## 2021-01-10 PROCEDURE — 74011250636 HC RX REV CODE- 250/636: Performed by: HOSPITALIST

## 2021-01-10 PROCEDURE — 99223 1ST HOSP IP/OBS HIGH 75: CPT | Performed by: INTERNAL MEDICINE

## 2021-01-10 PROCEDURE — 96376 TX/PRO/DX INJ SAME DRUG ADON: CPT

## 2021-01-10 PROCEDURE — 74011250637 HC RX REV CODE- 250/637: Performed by: EMERGENCY MEDICINE

## 2021-01-10 PROCEDURE — 74011250637 HC RX REV CODE- 250/637: Performed by: HOSPITALIST

## 2021-01-10 PROCEDURE — 85025 COMPLETE CBC W/AUTO DIFF WBC: CPT

## 2021-01-10 PROCEDURE — 94640 AIRWAY INHALATION TREATMENT: CPT

## 2021-01-10 PROCEDURE — 74011000250 HC RX REV CODE- 250: Performed by: INTERNAL MEDICINE

## 2021-01-10 RX ORDER — DIPHENHYDRAMINE HCL 25 MG
25 CAPSULE ORAL ONCE
Status: ACTIVE | OUTPATIENT
Start: 2021-01-10 | End: 2021-01-11

## 2021-01-10 RX ORDER — CLONIDINE HYDROCHLORIDE 0.1 MG/1
0.2 TABLET ORAL 2 TIMES DAILY
Status: DISCONTINUED | OUTPATIENT
Start: 2021-01-10 | End: 2021-01-18 | Stop reason: HOSPADM

## 2021-01-10 RX ORDER — ALBUTEROL SULFATE 1.25 MG/3ML
1.25 SOLUTION RESPIRATORY (INHALATION)
Status: DISCONTINUED | OUTPATIENT
Start: 2021-01-10 | End: 2021-01-10

## 2021-01-10 RX ORDER — DIPHENHYDRAMINE HYDROCHLORIDE 50 MG/ML
25 INJECTION, SOLUTION INTRAMUSCULAR; INTRAVENOUS ONCE
Status: COMPLETED | OUTPATIENT
Start: 2021-01-10 | End: 2021-01-10

## 2021-01-10 RX ORDER — AMOXICILLIN 250 MG
1 CAPSULE ORAL 2 TIMES DAILY
Status: DISCONTINUED | OUTPATIENT
Start: 2021-01-10 | End: 2021-01-16

## 2021-01-10 RX ORDER — FAMOTIDINE 20 MG/1
20 TABLET, FILM COATED ORAL DAILY
Status: DISCONTINUED | OUTPATIENT
Start: 2021-01-10 | End: 2021-01-18 | Stop reason: HOSPADM

## 2021-01-10 RX ORDER — ACETAMINOPHEN 325 MG/1
650 TABLET ORAL
Status: DISCONTINUED | OUTPATIENT
Start: 2021-01-10 | End: 2021-01-18 | Stop reason: HOSPADM

## 2021-01-10 RX ORDER — FUROSEMIDE 10 MG/ML
40 INJECTION INTRAMUSCULAR; INTRAVENOUS ONCE
Status: COMPLETED | OUTPATIENT
Start: 2021-01-10 | End: 2021-01-10

## 2021-01-10 RX ORDER — ASPIRIN 81 MG/1
81 TABLET ORAL DAILY
Status: DISCONTINUED | OUTPATIENT
Start: 2021-01-10 | End: 2021-01-18 | Stop reason: HOSPADM

## 2021-01-10 RX ORDER — NITROGLYCERIN 0.4 MG/1
0.4 TABLET SUBLINGUAL
Status: DISCONTINUED | OUTPATIENT
Start: 2021-01-10 | End: 2021-01-18 | Stop reason: HOSPADM

## 2021-01-10 RX ORDER — SODIUM CHLORIDE 0.9 % (FLUSH) 0.9 %
5-40 SYRINGE (ML) INJECTION AS NEEDED
Status: DISCONTINUED | OUTPATIENT
Start: 2021-01-10 | End: 2021-01-15 | Stop reason: SDUPTHER

## 2021-01-10 RX ORDER — ALBUTEROL SULFATE 1.25 MG/3ML
2.5 SOLUTION RESPIRATORY (INHALATION)
Status: DISCONTINUED | OUTPATIENT
Start: 2021-01-10 | End: 2021-01-18 | Stop reason: SDUPTHER

## 2021-01-10 RX ORDER — ATORVASTATIN CALCIUM 20 MG/1
20 TABLET, FILM COATED ORAL
Status: DISCONTINUED | OUTPATIENT
Start: 2021-01-10 | End: 2021-01-18 | Stop reason: HOSPADM

## 2021-01-10 RX ORDER — AMLODIPINE BESYLATE 10 MG/1
10 TABLET ORAL DAILY
Status: DISCONTINUED | OUTPATIENT
Start: 2021-01-10 | End: 2021-01-18 | Stop reason: HOSPADM

## 2021-01-10 RX ORDER — SODIUM CHLORIDE 0.9 % (FLUSH) 0.9 %
5-40 SYRINGE (ML) INJECTION EVERY 8 HOURS
Status: DISCONTINUED | OUTPATIENT
Start: 2021-01-10 | End: 2021-01-18 | Stop reason: HOSPADM

## 2021-01-10 RX ORDER — SODIUM CHLORIDE 0.9 % (FLUSH) 0.9 %
5-40 SYRINGE (ML) INJECTION EVERY 8 HOURS
Status: DISCONTINUED | OUTPATIENT
Start: 2021-01-10 | End: 2021-01-15 | Stop reason: SDUPTHER

## 2021-01-10 RX ORDER — HEPARIN SODIUM 5000 [USP'U]/ML
5000 INJECTION, SOLUTION INTRAVENOUS; SUBCUTANEOUS EVERY 8 HOURS
Status: DISCONTINUED | OUTPATIENT
Start: 2021-01-10 | End: 2021-01-18 | Stop reason: HOSPADM

## 2021-01-10 RX ORDER — CLONIDINE HYDROCHLORIDE 0.1 MG/1
0.2 TABLET ORAL 3 TIMES DAILY
Status: DISCONTINUED | OUTPATIENT
Start: 2021-01-10 | End: 2021-01-10

## 2021-01-10 RX ORDER — ACETAMINOPHEN 325 MG/1
650 TABLET ORAL
Status: DISCONTINUED | OUTPATIENT
Start: 2021-01-10 | End: 2021-01-10

## 2021-01-10 RX ORDER — IPRATROPIUM BROMIDE AND ALBUTEROL SULFATE 2.5; .5 MG/3ML; MG/3ML
3 SOLUTION RESPIRATORY (INHALATION)
Status: DISCONTINUED | OUTPATIENT
Start: 2021-01-10 | End: 2021-01-17

## 2021-01-10 RX ORDER — MONTELUKAST SODIUM 10 MG/1
10 TABLET ORAL
Status: DISCONTINUED | OUTPATIENT
Start: 2021-01-10 | End: 2021-01-18 | Stop reason: HOSPADM

## 2021-01-10 RX ORDER — ACETAMINOPHEN 650 MG/1
650 SUPPOSITORY RECTAL
Status: DISCONTINUED | OUTPATIENT
Start: 2021-01-10 | End: 2021-01-10

## 2021-01-10 RX ORDER — GUAIFENESIN 600 MG/1
600 TABLET, EXTENDED RELEASE ORAL EVERY 12 HOURS
Status: DISCONTINUED | OUTPATIENT
Start: 2021-01-10 | End: 2021-01-14

## 2021-01-10 RX ORDER — POLYETHYLENE GLYCOL 3350 17 G/17G
17 POWDER, FOR SOLUTION ORAL DAILY PRN
Status: DISCONTINUED | OUTPATIENT
Start: 2021-01-10 | End: 2021-01-18 | Stop reason: HOSPADM

## 2021-01-10 RX ORDER — ACETAMINOPHEN 650 MG/1
650 SUPPOSITORY RECTAL
Status: DISCONTINUED | OUTPATIENT
Start: 2021-01-10 | End: 2021-01-18 | Stop reason: HOSPADM

## 2021-01-10 RX ORDER — POLYETHYLENE GLYCOL 3350 17 G/17G
17 POWDER, FOR SOLUTION ORAL DAILY PRN
Status: DISCONTINUED | OUTPATIENT
Start: 2021-01-10 | End: 2021-01-13

## 2021-01-10 RX ORDER — LISINOPRIL 20 MG/1
20 TABLET ORAL DAILY
Status: DISCONTINUED | OUTPATIENT
Start: 2021-01-10 | End: 2021-01-18 | Stop reason: HOSPADM

## 2021-01-10 RX ORDER — GUAIFENESIN/DEXTROMETHORPHAN 100-10MG/5
10 SYRUP ORAL
Status: DISCONTINUED | OUTPATIENT
Start: 2021-01-10 | End: 2021-01-13 | Stop reason: SDUPTHER

## 2021-01-10 RX ORDER — SODIUM CHLORIDE 0.9 % (FLUSH) 0.9 %
5-40 SYRINGE (ML) INJECTION AS NEEDED
Status: DISCONTINUED | OUTPATIENT
Start: 2021-01-10 | End: 2021-01-18 | Stop reason: HOSPADM

## 2021-01-10 RX ADMIN — GUAIFENESIN AND DEXTROMETHORPHAN 10 ML: 100; 10 SYRUP ORAL at 01:07

## 2021-01-10 RX ADMIN — FUROSEMIDE 40 MG: 10 INJECTION, SOLUTION INTRAMUSCULAR; INTRAVENOUS at 15:25

## 2021-01-10 RX ADMIN — MONTELUKAST 10 MG: 10 TABLET, FILM COATED ORAL at 21:27

## 2021-01-10 RX ADMIN — CEFEPIME 2 G: 2 INJECTION, POWDER, FOR SOLUTION INTRAVENOUS at 11:52

## 2021-01-10 RX ADMIN — FAMOTIDINE 20 MG: 20 TABLET, FILM COATED ORAL at 11:52

## 2021-01-10 RX ADMIN — IPRATROPIUM BROMIDE AND ALBUTEROL SULFATE 3 ML: .5; 3 SOLUTION RESPIRATORY (INHALATION) at 15:25

## 2021-01-10 RX ADMIN — LISINOPRIL 20 MG: 20 TABLET ORAL at 11:52

## 2021-01-10 RX ADMIN — GUAIFENESIN 600 MG: 600 TABLET, EXTENDED RELEASE ORAL at 20:54

## 2021-01-10 RX ADMIN — ARFORMOTEROL TARTRATE: 15 SOLUTION RESPIRATORY (INHALATION) at 12:09

## 2021-01-10 RX ADMIN — ATORVASTATIN CALCIUM 20 MG: 20 TABLET, FILM COATED ORAL at 21:27

## 2021-01-10 RX ADMIN — ATORVASTATIN CALCIUM 20 MG: 20 TABLET, FILM COATED ORAL at 00:59

## 2021-01-10 RX ADMIN — CLONIDINE HYDROCHLORIDE 0.2 MG: 0.1 TABLET ORAL at 11:52

## 2021-01-10 RX ADMIN — DIPHENHYDRAMINE HYDROCHLORIDE 25 MG: 50 INJECTION INTRAMUSCULAR; INTRAVENOUS at 21:40

## 2021-01-10 RX ADMIN — METHYLPREDNISOLONE SODIUM SUCCINATE 60 MG: 40 INJECTION, POWDER, FOR SOLUTION INTRAMUSCULAR; INTRAVENOUS at 20:43

## 2021-01-10 RX ADMIN — AMLODIPINE BESYLATE 10 MG: 10 TABLET ORAL at 11:52

## 2021-01-10 RX ADMIN — CLONIDINE HYDROCHLORIDE 0.2 MG: 0.1 TABLET ORAL at 20:54

## 2021-01-10 RX ADMIN — AZITHROMYCIN DIHYDRATE 500 MG: 500 INJECTION, POWDER, LYOPHILIZED, FOR SOLUTION INTRAVENOUS at 20:49

## 2021-01-10 RX ADMIN — METHYLPREDNISOLONE SODIUM SUCCINATE 60 MG: 40 INJECTION, POWDER, FOR SOLUTION INTRAMUSCULAR; INTRAVENOUS at 14:43

## 2021-01-10 RX ADMIN — GUAIFENESIN 600 MG: 600 TABLET, EXTENDED RELEASE ORAL at 00:59

## 2021-01-10 RX ADMIN — DOCUSATE SODIUM 50 MG AND SENNOSIDES 8.6 MG 1 TABLET: 8.6; 5 TABLET, FILM COATED ORAL at 11:52

## 2021-01-10 RX ADMIN — Medication 10 ML: at 22:00

## 2021-01-10 RX ADMIN — MONTELUKAST 10 MG: 10 TABLET, FILM COATED ORAL at 00:59

## 2021-01-10 RX ADMIN — DOCUSATE SODIUM 50 MG AND SENNOSIDES 8.6 MG 1 TABLET: 8.6; 5 TABLET, FILM COATED ORAL at 20:54

## 2021-01-10 RX ADMIN — GUAIFENESIN 600 MG: 600 TABLET, EXTENDED RELEASE ORAL at 11:52

## 2021-01-10 RX ADMIN — ASPIRIN 81 MG: 81 TABLET, COATED ORAL at 11:52

## 2021-01-10 NOTE — ED NOTES
Introduced self to patient  Pt laying bed   Alert and oriented x 4   Denied pain   Pt handling secretions   No trouble swallowing   Able to handle secretions   Verified pt using 2 identifiers  Explained use and side effects  Medicated as ordered  Gave water.    Attempted to flush IV site   Pt c/o pain   Noted swelling and tender to touch   Removed IV and bandaged site  Informed charge nurse  Pt positioned in bed for comfort  Updated about plan of care    Will continue to monitor and assess

## 2021-01-10 NOTE — CONSULTS
[facility] - Consult Note    Consultation request by Loyda Green MD for advice/opinion related to evaluating Acute exacerbation of chronic obstructive pulmonary disease (COPD) ([facility] [J44.1]  COPD exacerbation ([facility] [J44.1]    Date of  Admission: 1/9/2021  4:04 PM   Primary Care Physician:  Adam Zelaya MD     Assessment:       --COPD exacerbation. Primary reason for admission. Patient was just in the ER 2 days ago and left AGAINST MEDICAL ADVICE and then returned because her shortness of breath is not significantly improved. --Recurrent chest pain. Noncardiac in nature likely musculoskeletal related to her underlying COPD. She underwent a cardiac catheterization in March 2020 which was completely normal showing no obstructive CAD and normal LV filling pressures. --Prolonged QTc interval.  This has been seen on prior EKGs. Is only mildly prolonged on today's EKG. It was longer on her previous EKGs. --History of noncompliance. Patient left from the emergency room AGAINST MEDICAL ADVICE 2 days ago. --History of heroin abuse and polysubstance abuse. Patient is now on a methadone program.  --Essential hypertension. Blood pressure has been labile in the past.       Plan:     Would resume outpatient antihypertensive agents. Telemetry monitoring with daily EKGs to monitor for any QTC prolongation. As long as her QTC is less than 500 msec, would not be overly concerned about QT prolonging medications. No additional noninvasive or invasive cardiac imaging needed this admission. We will defer treatment of her COPD to the hospitalist.     History of Present Illness: This is a 63 y.o. female admitted for Acute exacerbation of chronic obstructive pulmonary disease (COPD) ([facility] [J44.1]  COPD exacerbation ([facility]) [J44.1]. Patient returns to the ER yesterday afternoon complaining of worsening shortness of breath.   She had just left the emergency room the day prior AGAINST MEDICAL ADVICE. She was supposed to be admitted for a COPD exacerbation and  prolongation of her QTc interval.  Patient states that she did not want to be admitted to the hospital, returned home and started smoking cigarettes again. She states she immediately noticed that her breathing was worse, so decided that she would not last outside of the hospital so decided to be reevaluated in the emergency room. She also complains of chronic left-sided chest pain which she described as a pressure across the top of her chest which tends to occur with either rest or exertion is not particular worse with exertion. The symptoms are worse when she pushes on her chest or takes a deep breath. She also notes the symptoms worse when she is coughing. She denies any heart palpitations, dizziness, nor syncope. Cardiac risk factors: smoking/ tobacco exposure, family history, hypertension      Review of Symptoms:  Except as stated above include:  Constitutional:  negative  Respiratory:  negative  Cardiovascular:  negative  Gastrointestinal: negative  Genitourinary:  negative  Musculoskeletal:  Negative  Neurological:  Negative  Dermatological:  Negative  Endocrinological: Negative  Psychological:  Negative    A comprehensive review of systems was negative except for that written in the HPI.      Past Medical History:     Past Medical History:   Diagnosis Date    CHF (congestive heart failure) ([facility]     Chronic respiratory failure with hypoxia ([facility] 9/7/2020    CKD (chronic kidney disease) stage 3, GFR 30-59 ml/min 10/31/2019    Cocaine abuse ([address] 11/26/2017    COPD (chronic obstructive pulmonary disease) with chronic bronchitis ([facility] 12/19/2017    Dependence on supplemental oxygen     Depression 3/22/2020    Drug-seeking behavior 11/19/2016    Endocrine disease     thyroid issues    Essential hypertension 3/10/2017    Fe deficiency anemia 10/27/2012    Fibromyalgia 12/16/2016    Gastroesophageal reflux disease without esophagitis 10/10/2016    Gastrointestinal disorder     \"blockage in my stomach\"    Hypercholesterolemia     Hypertension     Hypertensive heart failure ([facility] 12/19/2017    Morbid obesity due to excess calories ([facility] 3/10/2017    NSTEMI (non-ST elevated myocardial infarction) ([facility] 3/22/2020    On home O2 12/3/2015    Overview:  2L at home per pt for approx 8 years    Polysubstance abuse ([facility] 9/15/2018    Respiratory failure ([facility] 1/11/2017    S/P hernia repair 10/31/2019    Sleep apnea 12/19/2017    T wave inversion in EKG 3/9/2019    Tobacco use 8/27/2741    Uncomplicated severe persistent asthma 12/13/2016    Vocal cord disease 12/7/2016         Social History:     Social History     Socioeconomic History    Marital status: SINGLE     Spouse name: Not on file    Number of children: Not on file    Years of education: Not on file    Highest education level: Not on file   Tobacco Use    Smoking status: Former Smoker     Packs/day: 0.25    Smokeless tobacco: Current User    Tobacco comment: Pt states she has not had money to buy cigarettes in the past month   Substance and Sexual Activity    Alcohol use: No     Comment: socially    Drug use: Not Currently     Types: Heroin     Comment: pt reports using approximately a month ago    Sexual activity: Not Currently     Comment: last used 9 years ago        Family History:   No family history on file.      Medications:   No Known Allergies     Current Facility-Administered Medications   Medication Dose Route Frequency    amLODIPine (NORVASC) tablet 10 mg  10 mg Oral DAILY    aspirin delayed-release tablet 81 mg  81 mg Oral DAILY    atorvastatin (LIPITOR) tablet 20 mg  20 mg Oral QHS    cloNIDine HCL (CATAPRES) tablet 0.2 mg  0.2 mg Oral TID    famotidine (PEPCID) tablet 20 mg  20 mg Oral DAILY    lisinopriL (PRINIVIL, ZESTRIL) tablet 20 mg  20 mg Oral DAILY    montelukast (SINGULAIR) tablet 10 mg  10 mg Oral QHS    roflumilast (DALIRESP) tablet 500 mcg  500 mcg Oral DAILY    sodium chloride (NS) flush 5-40 mL  5-40 mL IntraVENous Q8H    sodium chloride (NS) flush 5-40 mL  5-40 mL IntraVENous PRN    acetaminophen (TYLENOL) tablet 650 mg  650 mg Oral Q6H PRN    Or    acetaminophen (TYLENOL) suppository 650 mg  650 mg Rectal Q6H PRN    polyethylene glycol (MIRALAX) packet 17 g  17 g Oral DAILY PRN    senna-docusate (PERICOLACE) 8.6-50 mg per tablet 1 Tab  1 Tab Oral BID    heparin (porcine) injection 5,000 Units  5,000 Units SubCUTAneous Q8H    arformoterol 15 mcg/budesonide 0.5 mg neb solution   Nebulization BID RT    methylPREDNISolone (PF) (SOLU-MEDROL) injection 60 mg  60 mg IntraVENous Q6H    guaiFENesin ER (MUCINEX) tablet 600 mg  600 mg Oral Q12H    sodium chloride (NS) flush 5-40 mL  5-40 mL IntraVENous Q8H    sodium chloride (NS) flush 5-40 mL  5-40 mL IntraVENous PRN    polyethylene glycol (MIRALAX) packet 17 g  17 g Oral DAILY PRN    albuterol (ACCUNEB) nebulizer solution 2.5 mg  2.5 mg Inhalation Q4H PRN    nitroglycerin (NITROSTAT) tablet 0.4 mg  0.4 mg SubLINGual Q5MIN PRN    cefepime (MAXIPIME) 2 g in sterile water (preservative free) 10 mL IV syringe  2 g IntraVENous Q8H    guaiFENesin-dextromethorphan (ROBITUSSIN DM) 100-10 mg/5 mL syrup 10 mL  10 mL Oral Q6H PRN     Current Outpatient Medications   Medication Sig    albuterol (PROVENTIL HFA, VENTOLIN HFA, PROAIR HFA) 90 mcg/actuation inhaler Take 2 Puffs by inhalation every four (4) hours as needed for Wheezing or Shortness of Breath. Indications: bronchospasm prevention    nitroglycerin (NITROSTAT) 0.4 mg SL tablet Take 1 Tab by mouth every five (5) minutes as needed for Chest Pain. Sit/Lay down then put one tab under the tongue every 5 minutes as needed for chest pain for 3 doses    aspirin delayed-release 81 mg tablet Take 1 Tab by mouth daily.  amLODIPine (NORVASC) 10 mg tablet Take 1 Tab by mouth daily for 30 days.  Indications: high blood pressure    lisinopriL (PriniviL) 20 mg tablet Take 1 Tab by mouth daily for 30 days.  cloNIDine HCL (CATAPRES) 0.2 mg tablet Take 1 Tab by mouth three (3) times daily for 30 days.  fluticasone furoate-vilanteroL (Breo Ellipta) 200-25 mcg/dose inhaler Take 1 Puff by inhalation daily. Indications: controller medication for asthma    montelukast (SINGULAIR) 10 mg tablet Take 1 Tab by mouth nightly. Indications: controller medication for asthma    roflumilast (DALIRESP) 500 mcg tab tablet Take 1 Tab by mouth daily.  albuterol (ACCUNEB) 1.25 mg/3 mL nebu Take 3 mL by inhalation every four (4) hours as needed for Wheezing (wheezing). Indications: bronchospasm prevention    citalopram (CeleXA) 20 mg tablet Take 1 Tab by mouth every morning.  cloNIDine HCL (CATAPRES) 0.2 mg tablet Take 0.2 mg by mouth three (3) times daily.  famotidine (PEPCID) 20 mg tablet Take 1 Tab by mouth daily. (Patient taking differently: Take 20 mg by mouth daily as needed.)    OXYGEN-AIR DELIVERY SYSTEMS 3 L by IntraNASal route as needed for Other (sob).  atorvastatin (LIPITOR) 20 mg tablet Take 1 Tab by mouth nightly.  naloxone (NARCAN) 4 mg/actuation nasal spray Use 1 spray intranasally, then discard. Repeat with new spray every 2 min as needed for opioid overdose symptoms, alternating nostrils.          Physical Exam:     Visit Vitals  BP (!) 106/53   Pulse 92   Temp 98.1 °F (36.7 °C)   Resp 21   SpO2 100%     BP Readings from Last 3 Encounters:   01/09/21 (!) 106/53   01/08/21 (!) 174/80   12/31/20 (!) 161/81     Pulse Readings from Last 3 Encounters:   01/09/21 92   01/08/21 (!) 105   12/31/20 79     Wt Readings from Last 3 Encounters:   01/08/21 69.9 kg (154 lb)   12/30/20 69.9 kg (154 lb)   12/28/20 69.9 kg (154 lb)       General:  alert, cooperative, no distress, appears stated age  Neck:  no carotid bruit, no JVD  Lungs: Diffuse wheezes throughout with decreased air movement  Heart:  regular rate and rhythm  Abdomen:  abdomen is soft without significant tenderness, masses, organomegaly or guarding  Extremities:  extremities normal, atraumatic, no cyanosis or edema  Skin: Warm and dry. no hyperpigmentation, vitiligo, or suspicious lesions  Neuro: alert, oriented x3, affect appropriate, no focal neurological deficits, moves all extremities well, no involuntary movements  Psych: non focal     Data Review:     Recent Labs     01/10/21  0635 01/09/21  1649 01/08/21  0156   WBC 14.8* 17.9* 11.1   HGB 11.0* 13.0 12.5   HCT 34.1* 39.4 38.4    274 236     Recent Labs     01/10/21  0635 01/09/21  1649 01/08/21  0156    141 144   K 4.5 3.8 3.4*    106 109   CO2 27 28 30   * 146* 103*   BUN 23* 18 17   CREA 1.27 1.35* 1.30   CA 8.7 9.0 8.3*   MG 2.2  --  1.9   ALB 3.0*  --  3.1*   ALT 22  --  19       Results for orders placed or performed during the hospital encounter of 01/08/21   EKG, 12 LEAD, INITIAL   Result Value Ref Range    Ventricular Rate 100 BPM    Atrial Rate 100 BPM    P-R Interval 134 ms    QRS Duration 86 ms    Q-T Interval 404 ms    QTC Calculation (Bezet) 521 ms    Calculated P Axis 63 degrees    Calculated R Axis 61 degrees    Calculated T Axis 18 degrees    Diagnosis       Normal sinus rhythm  Possible Left atrial enlargement  Left ventricular hypertrophy  Poor R Wave Progression  Prolonged QT  Abnormal ECG  When compared with ECG of 31-DEC-2020 04:30,  Nonspecific T wave abnormality now evident in Inferior leads  QT has lengthened  Confirmed by MD Christel, Joe ([id number]) on 1/8/2021 5:25:01 PM         All Cardiac Markers in the last 24 hours:    Lab Results   Component Value Date/Time    CPK 65 01/09/2021 04:49 PM    CKMB 2.5 01/09/2021 04:49 PM    CKND1 3.8 01/09/2021 04:49 PM    TROIQ <0.02 01/09/2021 04:49 PM       Last Lipid:  No results found for: CHOL, CHOLX, CHLST, CHOLV, HDL, HDLP, LDL, LDLC, DLDLP, TGLX, TRIGL, TRIGP, CHHD, CHHDX    Signed By: Joe Kearney MD      January 10, 2021

## 2021-01-10 NOTE — H&P
HISTORY & PHYSICAL      Patient: Rosina Long MRN: [id number]  CSN: [id number]    YOB: 1965  Age: 63 y.o. Sex: female    DOA: 1/9/2021 LOS:  LOS: 0 days        DOA: 1/9/2021        Assessment/Plan     Active Problems:    COPD exacerbation ([facility] [address] (9/7/2020)      Acute exacerbation of chronic obstructive pulmonary disease (COPD) ([address] (9/14/2020)        Plan:  1. Acute exacerbation of COPD  2. Prolonged QTc up to 547 msec. 3. Hypokalemia   4. COPD exacerbation  5. Chronic diastolic CHF   6. Chronic hypoxia respiratory failure  7. HTN   8. HLD  9. Tobacco dependence with recent cessation 11/2020  10. H/o IVDU  6. Chronic pain     PLAN     1.  IV steroids, IV antibiotics, continue bronchodilator therapy pulmonary consult in a.m. if no improvement  2. Cardiology following. Suspected worsened QT from combination of methadone, hypokalemia. UDS pending. Continue tele, follow up EKG tonight and in AM.   3. Cardiology recommend aggressive BP management however patient did not take her medications prior to coming to the ER. Will continue amlodipine / lisinopril, and clonidine for now. Adjust in AM if warranted. 4. Will avoid narcotics, methadone for now until cleared by cardiology. 5. Replete lytes and trend daily. 6.  Noncompliance with medications  DVT prophylaxisheparin  Full code            HPI:     Rosina Long is a 63 y.o. female who has a complex past medical history significant for COPD, HTN, chronic diastolic CHF, chronic hypoxic respiratory failure, depression, chronic pain, and hx IVDU on methadone. She presented to the ED for COPD exacerbation, was found to have prolonged QTc interval.   She was admitted yesterday for prolonged QTc interval and was seen by cardiology in the emergency room however signed out [place] later. Patient comes back to the emergency room today because she continues to be short of breath.   Patient is currently being admitted to the hospital secondary to COPD exacerbation and also prolonged QT interval.    Past Medical History:   Diagnosis Date    CHF (congestive heart failure) (HCC)     Chronic respiratory failure with hypoxia ([address] 9/7/2020    CKD (chronic kidney disease) stage 3, GFR 30-59 ml/min 10/31/2019    Cocaine abuse ([address] 11/26/2017    COPD (chronic obstructive pulmonary disease) with chronic bronchitis ([address] 12/19/2017    Dependence on supplemental oxygen     Depression 3/22/2020    Drug-seeking behavior 11/19/2016    Endocrine disease     thyroid issues    Essential hypertension 3/10/2017    Fe deficiency anemia 10/27/2012    Fibromyalgia 12/16/2016    Gastroesophageal reflux disease without esophagitis 10/10/2016    Gastrointestinal disorder     \"blockage in my stomach\"    Hypercholesterolemia     Hypertension     Hypertensive heart failure ([address] 12/19/2017    Morbid obesity due to excess calories ([address] 3/10/2017    NSTEMI (non-ST elevated myocardial infarction) ([address] 3/22/2020    On home O2 12/3/2015    Overview:  2L at home per pt for approx 8 years    Polysubstance abuse ([address] 9/15/2018    Respiratory failure ([address] 1/11/2017    S/P hernia repair 10/31/2019    Sleep apnea 12/19/2017    T wave inversion in EKG 3/9/2019    Tobacco use 1/54/3719    Uncomplicated severe persistent asthma 12/13/2016    Vocal cord disease 12/7/2016       Past Surgical History:   Procedure Laterality Date    HX GI      Exploratory laparotomy with lysis of adhesions and primary repair of incarcerated umbilical hernia       No family history on file.     Social History     Socioeconomic History    Marital status: SINGLE     Spouse name: Not on file    Number of children: Not on file    Years of education: Not on file    Highest education level: Not on file   Tobacco Use    Smoking status: Former Smoker     Packs/day: 0.25    Smokeless tobacco: Current User    Tobacco comment: Pt states she has not had money to buy cigarettes in the past month   Substance and Sexual Activity    Alcohol use: No     Comment: socially    Drug use: Not Currently     Types: Heroin     Comment: pt reports using approximately a month ago    Sexual activity: Not Currently     Comment: last used 9 years ago       Prior to Admission medications    Medication Sig Start Date End Date Taking? Authorizing Provider   albuterol (PROVENTIL HFA, VENTOLIN HFA, PROAIR HFA) 90 mcg/actuation inhaler Take 2 Puffs by inhalation every four (4) hours as needed for Wheezing or Shortness of Breath. Indications: bronchospasm prevention 12/31/20   Steven Hoang MD   nitroglycerin (NITROSTAT) 0.4 mg SL tablet Take 1 Tab by mouth every five (5) minutes as needed for Chest Pain. Sit/Lay down then put one tab under the tongue every 5 minutes as needed for chest pain for 3 doses 12/31/20   Steven Hoang MD   aspirin delayed-release 81 mg tablet Take 1 Tab by mouth daily. 12/30/20   Steven Hoang MD   amLODIPine (NORVASC) 10 mg tablet Take 1 Tab by mouth daily for 30 days. Indications: high blood pressure 12/30/20 1/29/21  Steven Hoang MD   lisinopriL (PriniviL) 20 mg tablet Take 1 Tab by mouth daily for 30 days. 12/30/20 1/29/21  Steven Hoang MD   cloNIDine HCL (CATAPRES) 0.2 mg tablet Take 1 Tab by mouth three (3) times daily for 30 days. 12/28/20 1/27/21  Britt Tran MD   fluticasone furoate-vilanteroL (Breo Ellipta) 200-25 mcg/dose inhaler Take 1 Puff by inhalation daily. Indications: controller medication for asthma 12/25/20   Rachel Dixon MD   montelukast (SINGULAIR) 10 mg tablet Take 1 Tab by mouth nightly. Indications: controller medication for asthma 12/25/20   Rachel Dixon MD   roflumilast (DALIRESP) 500 mcg tab tablet Take 1 Tab by mouth daily. 12/25/20   Rachel Dixon MD   albuterol (ACCUNEB) 1.25 mg/3 mL nebu Take 3 mL by inhalation every four (4) hours as needed for Wheezing (wheezing).  Indications: bronchospasm prevention 12/25/20   Rachel Dixon MD citalopram (CeleXA) 20 mg tablet Take 1 Tab by mouth every morning. 11/30/20   Rachel iDxon MD   cloNIDine HCL (CATAPRES) 0.2 mg tablet Take 0.2 mg by mouth three (3) times daily. 10/1/20   Provider, Historical   famotidine (PEPCID) 20 mg tablet Take 1 Tab by mouth daily. Patient taking differently: Take 20 mg by mouth daily as needed. 3/26/20   Trini Couch MD   OXYGEN-AIR DELIVERY SYSTEMS 3 L by IntraNASal route as needed for Other (sob). Provider, Historical   atorvastatin (LIPITOR) 20 mg tablet Take 1 Tab by mouth nightly. 3/14/19   Martín Tadeo MD   naloxone [facility]) 4 mg/actuation nasal spray Use 1 spray intranasally, then discard. Repeat with new spray every 2 min as needed for opioid overdose symptoms, alternating nostrils. 3/14/19   Martín Tadeo MD       No Known Allergies    Review of Systems  A comprehensive review of systems was negative except for that written in the History of Present Illness. Physical Exam:      Visit Vitals  /68   Pulse (!) 107   Temp 98.1 °F (36.7 °C)   Resp 21   SpO2 91%       Physical Exam:    Gen: In general, this is a well nourished female in no acute distress  HEENT: Sclerae nonicteric. Oral mucous membranes moist. Dentition normal  Neck: Supple with midline trachea. CV: RRR without murmur or rub appreciated. Resp: Decreased breath sounds bilaterally with moderate expiratory wheezing  Abd: Soft, nontender, nondistended. Extrem: Extremities are warm, without cyanosis or clubbing. No pitting pretibial edema. Skin: Warm, no visible rashes. Neuro: Patient is alert, oriented, and cooperative. No obvious focal defects. Moves all 4 extremities.     Labs Reviewed:    Recent Results (from the past 24 hour(s))   SARS-COV-2    Collection Time: 01/08/21  7:50 PM   Result Value Ref Range    Specimen source Nasopharyngeal      COVID-19 rapid test Not detected NOTD      Specimen type NP Swab     CBC WITH AUTOMATED DIFF    Collection Time: 01/09/21 4:49 PM   Result Value Ref Range    WBC 17.9 (H) 4.6 - 13.2 K/uL    RBC 4.37 4.20 - 5.30 M/uL    HGB 13.0 12.0 - 16.0 g/dL    HCT 39.4 35.0 - 45.0 %    MCV 90.2 74.0 - 97.0 FL    MCH 29.7 24.0 - 34.0 PG    MCHC 33.0 31.0 - 37.0 g/dL    RDW 13.2 11.6 - 14.5 %    PLATELET 274 135 - 420 K/uL    MPV 11.0 9.2 - 11.8 FL    NEUTROPHILS PENDING %    LYMPHOCYTES PENDING %    MONOCYTES PENDING %    EOSINOPHILS PENDING %    BASOPHILS PENDING %    ABS. NEUTROPHILS PENDING K/UL    ABS. LYMPHOCYTES PENDING K/UL    ABS. MONOCYTES PENDING K/UL    ABS. EOSINOPHILS PENDING K/UL    ABS. BASOPHILS PENDING K/UL    DF PENDING    METABOLIC PANEL, BASIC    Collection Time: 01/09/21  4:49 PM   Result Value Ref Range    Sodium 141 136 - 145 mmol/L    Potassium 3.8 3.5 - 5.5 mmol/L    Chloride 106 100 - 111 mmol/L    CO2 28 21 - 32 mmol/L    Anion gap 7 3.0 - 18 mmol/L    Glucose 146 (H) 74 - 99 mg/dL    BUN 18 7.0 - 18 MG/DL    Creatinine 1.35 (H) 0.6 - 1.3 MG/DL    BUN/Creatinine ratio 13 12 - 20      GFR est AA 49 (L) >60 ml/min/1.73m2    GFR est non-AA 41 (L) >60 ml/min/1.73m2    Calcium 9.0 8.5 - 10.1 MG/DL   CARDIAC PANEL,(CK, CKMB & TROPONIN)    Collection Time: 01/09/21  4:49 PM   Result Value Ref Range    CK - MB 2.5 <3.6 ng/ml    CK-MB Index 3.8 0.0 - 4.0 %    CK 65 26 - 192 U/L    Troponin-I, QT <0.02 0.0 - 0.045 NG/ML       Imaging Reviewed:    XR Results (most recent):  Results from Hospital Encounter encounter on 01/09/21   XR CHEST PORT    Narrative Portable Frontal Chest.    CLINICAL HISTORY: Shortness of breath.    TECHNIQUE: Single frontal view of the chest, obtained portably.    COMPARISONS: 1/8/2020.    FINDINGS:       Mild haziness in the left midlung field. Mild septal thickening right lung  base.. The cardiomediastinal silhouette is unremarkable.  Pulmonary vascularity  is normal, however.  There are no effusions.      Impression IMPRESSION:    Likely mild edema. Pneumonitis less likely. Correlate clinically and  consider  follow-up. CT Results (most recent):  Results from Hospital Encounter encounter on 10/11/20   CTA CHEST W OR W WO CONT    Narrative CTA CHEST PULMONARY EMBOLISM PROTOCOL        INDICATION: Heart failure, chronic kidney disease, cocaine abuse, oxygen  dependence, hypertension, fibromyalgia, morbid obesity, STEMI with exertional  chest and flank pain, pleuritic. . Question pulmonary embolism. TECHNIQUE: Thin collimation axial images obtained through the level of the  pulmonary arteries with additional imaging through the chest following the  uneventful administration of 100 mL Isovue-370 nonionic intravenous contrast.   Images reconstructed into three dimensional coronal and sagittal projections for  complete evaluation of the tortuous and overlapping pulmonary vascular  structures and to reduce patient radiation dose. All CT scans at this facility are performed using dose optimization technique as  appropriate to a performed exam, to include automated exposure control,  adjustment of the mA and/or kV according to patient size (including appropriate  matching first site-specific examinations), or use of iterative reconstruction  technique. COMPARISON: 10/8/2020; 7/26/2020. FINDINGS:    No filling defects are appreciated within the main, left, right, lobar or  visualized segmental pulmonary arteries to suggest embolism. Main pulmonary  artery is 3.1 cm transverse diameter. Thyroid: Right lobe is 4 cm and the left is 3 cm. It is diffusely heterogeneous  and likely nodular. Pericardium/ Heart: Left atrium appears dilated. Aorta/ Vessels: No aneurysm or dissection. Lymph Nodes: Unremarkable. .    Lungs: Mild central bronchial wall thickening. Limited evaluation secondary to  respiratory motion. Mild regions of subsegmental atelectasis or scarring lung  bases. Pleura: No effusion. Upper Abdomen: Left lateral hepatic cysts measure up to 1.5 cm. Left adrenal  thickening.  Incompletely visualized at least 2.2 cm left upper pole renal cyst.    Bones/soft tissues: Mild anasarca. No acute bony findings. Impression IMPRESSION:  No evidence for pulmonary emboli. Mild bronchitis or central venous congestion. Main pulmonary artery is mildly enlarged as may be seen in pulmonary arterial  hypertension. Left atrium appears enlarged. Thyromegaly and multinodular goiter. Mild anasarca. Sidney Gonzalez MD  1/9/2021, 7:04 PM        Disclaimer: Sections of this note are dictated using utilizing voice recognition software. Minor typographical errors may be present. If questions arise, please do not hesitate to contact me or call our department.

## 2021-01-10 NOTE — CONSULTS
[facility]. Pulmonary, Critical Care, and Sleep Medicine    Initial Patient Consult    Name: Lisseth Russ MRN: [id number]   : 1965 Hospital: [address] [place] [address]   Date: 1/10/2021        IMPRESSION:   · Respiratory distress: suspect due to several factors: asthma/COPD, possible respiratory infection, inhalation (tobacco, cocaine), VCD, anxiety/panic attack  · Respiratory failure: hypoxic- report on home oxygen  · COPD/Asthma Exacerbation - Possible CAP. Elevated WBC with yellow productive sputum. On 4L home O2. Still smokes about 1pack per 2 weeks.  Did follow with [facility] however admits she hasn't seen a pulmonologist in a while  · (+) Nicotine Smoker - 1pk /1-2 wks  · Substance Abuse: reports taking methadone, denies other drug use: UDS + Cocaine and methadone 20  · R/O MY - States she has a PAP machine at home but it is full of mold and so she doesn't use it anymore --> needs a new device  · Hx of Vocal Cord Dysfunction Syndrome - [place] [facility] [place] by [place]   · Hx of Non-compliance  · Hx of Thyroid Disorder  · Anxiety with Grief response: Reports death of brother and mother 4 months ago and 31 y/o daughter about one year ago     Patient Active Problem List   Diagnosis Code    Abnormal CT of the chest R93.89    Asthma J45.909    Respiratory failure ([address] J96.90    Acute exacerbation of COPD with asthma ([address] J44.1, J45.901    Essential hypertension I10    Morbid obesity due to excess calories ([address] E66.01    Hypertensive heart failure ([address] I11.0    Sleep apnea G47.30    COPD (chronic obstructive pulmonary disease) with chronic bronchitis ([address] J44.9    T wave inversion in EKG R94.31    Tobacco use Z72.0    S/P hernia repair Z98.890, Z87.19    CKD (chronic kidney disease) stage 3, GFR 30-59 ml/min N18.30    Cocaine abuse ([address] F14.10    Drug-seeking behavior Z76.5    Fibromyalgia M79.7    Depression F32.9  Fe deficiency anemia D50.9    Gastroesophageal reflux disease without esophagitis K21.9    HLD (hyperlipidemia) E78.5    Thyroid goiter E04.9    MDRO (multiple drug resistant organisms) resistance [id number]    On home O2 Z99.81    Polysubstance abuse ([facility]) N02.56    Uncomplicated severe persistent asthma J45.50    Vocal cord disease J38.3    COPD with acute exacerbation ([facility]) J44.1    Chronic respiratory failure with hypoxia ([facility]) J96.11    Acute bronchitis J20.9    Suspected COVID-19 virus infection Z20.822    Tachycardia R00.0    Normocytic anemia D64.9    Acute respiratory distress R06.03    COPD exacerbation ([facility]) J44.1    Acute exacerbation of chronic obstructive pulmonary disease (COPD) ([facility]) J44.1    UTI (urinary tract infection) N39.0    Atypical chest pain R07.89    DEMARCUS (acute kidney injury) ([facility]) N17.9    Asthma exacerbation J45. [address]    COPD (chronic obstructive pulmonary disease) ([facility]) J44.9    Acute bronchitis with chronic obstructive pulmonary disease (COPD) ([facility]) J44.0, J20.9    CHF (congestive heart failure) ([facility]) I50.9    Long QT interval R94.31      RECOMMENDATIONS:   · SpO2 goal >90%-94; titrate supp O2 PRN; currently resting on NC- avoid overoxygenation  · BiPAP qHS PRN   · Optimize bronchial hygiene. Encourage IS; add PEP device   · Con't duo-nebs q4 for now; albuterol nebs q4 PRN   · Cont' Steroids - Solu-Medrol 60mg q6. Will taper once out of acute phase. · Con't Singulair    · Con't Mucinex and Robitussin   · Agree with ABX - management per primary-see attendings comments below  · Check Legionella and Strep Pneumo and Sputum Cx  · Check [employer] for COVID-19 and other possible viral etiology  · Agree with trial of diuretics - monitor UOP. Strict I/Os. Trend BNP  · Assess home Oxygen needs at discharge  · OT, PT, OOB and ambulate  · Healthy weight  · Will Follow  · DVT, PUD prophylaxis per primary      Subjective:      This patient has been seen and evaluated at the request of Dr. Silvana Montanez for COPD Exacerbation. Patient is a 63 y.o. female, current smoker, with PMH of COPD/Asthma, Vocal Cord Dysfunction, CHF, CKD, HTN, non-compliance, polysubstance abuse, and depression who presented [facility] ED on 01/09/21 c/p progressive worsening SOB/NEETA with productive cough x2 weeks PTA. Of note, pt was seen and admitted to hospital for same complaint on 01/08 and left AMA after becoming displeased with nursing staff upon admission because they \"wouldn't give her her albuterol nebs more that q4 hours and she felt that she needed them more than that\", per patient. She presented back to ED on 01/09 for continued worsening SOB/NEETA. She again has been admitted to Hospitalist service for COPD exacerbation and was started on nebs, steroids and ABX. PCCM consulted for evaluation. Rapid COVID-19 (-) on 01/09/21. COVID-19 PCR (-) on 11/16/20. Upon evaluation, pt is AOx4 and able to converse without difficulty. (-) conversational dyspnea. Pt states symptoms of SOB/NEETA with productive cough with yellow thick sputum started about 2 weeks ago. Admits to chest discomfort and mild nausea 2/2 excessive coughing, currently resolved. Denies any ABD pain, palpitations, N/V/D, F/C, HA, dizziness, rhinorrhea/congestion. States she takes her albuterol inhaler at home as instructed, however after further probing, she admits that she has been taking it nearly continuously and notes that she recently was also started on Breo. She does admit to still smoking cigarettes, about 1pk every 1-2weeks. Denies EtoH- or illicit drug use. Regarding her Pulmonologist, she states that she did previously see Dr. Greta Taylor with [facility], but admits that she hasn't followed up in \"quite a while\". She was also just admitted to [facility] on 12/22 for similar respiratory complaints. She was previously followed in 2016/2017 by Dr. Kendell Peterson -Pulmonary at [facility]- Some notes available for review.  Notes refer to history of asthma/COPD, and VCD which responded well to SLP. Patient pacing the halls during other hospitalizations,  missed appointments and reports of concern for possible malingering and pain seeking behaviors- Further details can be reviewed in care everywhere     PFTs from 2015 in Care Everywhere notes mixed restrictive and reversible obstruction and FeV1 of 0.95% (27% predicted)- unclear if there were any technical limitations or how reflective this result is.     Patient Vitals for the past 24 hrs:   Pulse Resp BP SpO2   01/10/21 1525 99  130/68    01/10/21 1345 99 29 110/66 100 %   01/10/21 1300 (!) 102 22 133/62 100 %   01/10/21 1215 88 24 (!) 118/59 98 %   01/10/21 1130 89 25 117/65 99 %   01/10/21 1045 (!) 109 24 130/69 100 %   01/10/21 0900 (!) 102 22 (!) 145/57 100 %   01/09/21 2330 92 21 (!) 106/53 100 %   01/09/21 2200 (!) 107 21 (!) 138/52    01/09/21 2030 100 26 (!) 121/46    01/09/21 2020    99 %         Past Medical History:   Diagnosis Date    CHF (congestive heart failure) (HCC)     Chronic respiratory failure with hypoxia ([address] 9/7/2020    CKD (chronic kidney disease) stage 3, GFR 30-59 ml/min 10/31/2019    Cocaine abuse ([address] 11/26/2017    COPD (chronic obstructive pulmonary disease) with chronic bronchitis ([address] 12/19/2017    Dependence on supplemental oxygen     Depression 3/22/2020    Drug-seeking behavior 11/19/2016    Endocrine disease     thyroid issues    Essential hypertension 3/10/2017    Fe deficiency anemia 10/27/2012    Fibromyalgia 12/16/2016    Gastroesophageal reflux disease without esophagitis 10/10/2016    Gastrointestinal disorder     \"blockage in my stomach\"    Hypercholesterolemia     Hypertension     Hypertensive heart failure ([address] 12/19/2017    Morbid obesity due to excess calories ([address] 3/10/2017    NSTEMI (non-ST elevated myocardial infarction) ([address] 3/22/2020    On home O2 12/3/2015    Overview:  2L at home per pt for approx 8 years    Polysubstance abuse ([facility] 9/15/2018    Respiratory failure ([facility] 1/11/2017    S/P hernia repair 10/31/2019    Sleep apnea 12/19/2017    T wave inversion in EKG 3/9/2019    Tobacco use 2/07/2672    Uncomplicated severe persistent asthma 12/13/2016    Vocal cord disease 12/7/2016      Past Surgical History:   Procedure Laterality Date    HX GI      Exploratory laparotomy with lysis of adhesions and primary repair of incarcerated umbilical hernia      Prior to Admission medications    Medication Sig Start Date End Date Taking? Authorizing Provider   albuterol (PROVENTIL HFA, VENTOLIN HFA, PROAIR HFA) 90 mcg/actuation inhaler Take 2 Puffs by inhalation every four (4) hours as needed for Wheezing or Shortness of Breath. Indications: bronchospasm prevention 12/31/20   Austin Dhaliwal MD   nitroglycerin (NITROSTAT) 0.4 mg SL tablet Take 1 Tab by mouth every five (5) minutes as needed for Chest Pain. Sit/Lay down then put one tab under the tongue every 5 minutes as needed for chest pain for 3 doses 12/31/20   Austin Dhaliwal MD   aspirin delayed-release 81 mg tablet Take 1 Tab by mouth daily. 12/30/20   Austin Dhaliwal MD   amLODIPine (NORVASC) 10 mg tablet Take 1 Tab by mouth daily for 30 days. Indications: high blood pressure 12/30/20 1/29/21  Austin Dhaliwal MD   lisinopriL (PriniviL) 20 mg tablet Take 1 Tab by mouth daily for 30 days. 12/30/20 1/29/21  Austin Dhaliwal MD   cloNIDine HCL (CATAPRES) 0.2 mg tablet Take 1 Tab by mouth three (3) times daily for 30 days. 12/28/20 1/27/21  Deb Olmedo MD   fluticasone furoate-vilanteroL (Breo Ellipta) 200-25 mcg/dose inhaler Take 1 Puff by inhalation daily. Indications: controller medication for asthma 12/25/20   Sierra Hamlin MD   montelukast (SINGULAIR) 10 mg tablet Take 1 Tab by mouth nightly. Indications: controller medication for asthma 12/25/20   Sierra Hamlin MD   roflumilast (DALIRESP) 500 mcg tab tablet Take 1 Tab by mouth daily.  12/25/20   Sierra Hamlin MD albuterol (ACCUNEB) 1.25 mg/3 mL nebu Take 3 mL by inhalation every four (4) hours as needed for Wheezing (wheezing). Indications: bronchospasm prevention 12/25/20   Ronald Ennis MD   citalopram (CeleXA) 20 mg tablet Take 1 Tab by mouth every morning. 11/30/20   Ronald Ennis MD   cloNIDine HCL (CATAPRES) 0.2 mg tablet Take 0.2 mg by mouth three (3) times daily. 10/1/20   Provider, Historical   famotidine (PEPCID) 20 mg tablet Take 1 Tab by mouth daily. Patient taking differently: Take 20 mg by mouth daily as needed. 3/26/20   Jose Luis Rubio MD   OXYGEN-AIR DELIVERY SYSTEMS 3 L by IntraNASal route as needed for Other (sob). Provider, Historical   atorvastatin (LIPITOR) 20 mg tablet Take 1 Tab by mouth nightly. 3/14/19   Domingo Zarate MD   naloxone [facility]) 4 mg/actuation nasal spray Use 1 spray intranasally, then discard. Repeat with new spray every 2 min as needed for opioid overdose symptoms, alternating nostrils. 3/14/19   Domingo Zarate MD     No Known Allergies   Social History     Tobacco Use    Smoking status: Former Smoker     Packs/day: 0.25    Smokeless tobacco: Current User    Tobacco comment: Pt states she has not had money to buy cigarettes in the past month   Substance Use Topics    Alcohol use: No     Comment: socially      No family history on file.      Current Facility-Administered Medications   Medication Dose Route Frequency    amLODIPine (NORVASC) tablet 10 mg  10 mg Oral DAILY    aspirin delayed-release tablet 81 mg  81 mg Oral DAILY    atorvastatin (LIPITOR) tablet 20 mg  20 mg Oral QHS    famotidine (PEPCID) tablet 20 mg  20 mg Oral DAILY    lisinopriL (PRINIVIL, ZESTRIL) tablet 20 mg  20 mg Oral DAILY    montelukast (SINGULAIR) tablet 10 mg  10 mg Oral QHS    roflumilast (DALIRESP) tablet 500 mcg  500 mcg Oral DAILY    sodium chloride (NS) flush 5-40 mL  5-40 mL IntraVENous Q8H    senna-docusate (PERICOLACE) 8.6-50 mg per tablet 1 Tab  1 Tab Oral BID    heparin (porcine) injection 5,000 Units  5,000 Units SubCUTAneous Q8H    methylPREDNISolone (PF) (SOLU-MEDROL) injection 60 mg  60 mg IntraVENous Q6H    guaiFENesin ER (MUCINEX) tablet 600 mg  600 mg Oral Q12H    sodium chloride (NS) flush 5-40 mL  5-40 mL IntraVENous Q8H    cefepime (MAXIPIME) 2 g in sterile water (preservative free) 10 mL IV syringe  2 g IntraVENous Q8H    albuterol-ipratropium (DUO-NEB) 2.5 MG-0.5 MG/3 ML  3 mL Nebulization Q4H RT    cloNIDine HCL (CATAPRES) tablet 0.2 mg  0.2 mg Oral BID       Review of Systems:  Pertinent items are noted in HPI. ROS    Objective:   Vital Signs:    Visit Vitals  /68   Pulse 99   Temp 98.1 °F (36.7 °C)   Resp 29   LMP 2009   SpO2 100%       O2 Device: Room air       Temp (24hrs), Av.1 °F (36.7 °C), Min:98.1 °F (36.7 °C), Max:98.1 °F (36.7 °C)       Intake/Output:   Last shift:      No intake/output data recorded. Last 3 shifts: No intake/output data recorded. No intake or output data in the 24 hours ending 01/10/21 1721     Physical Exam:   General:  Alert, awake, currently resting on NC; NAD,appears stated age. HEENT:  Normocephalic, atraumatic; Conjunctivae/corneas clear; anicteric; Nares/mucosa normal/moist; No drainage/sinus tenderness; Lips/oral mucosa moist; No thrush; Neck supple, symmetrical; trachea midline; No adenopathy; No thyroid enlargement/tenderness/nodules; No JVD noted   Back:   Symmetric, no curvature, no spine tenderness or flank pain   Resp:   Symmetrical chest rise; mod AE bilat; +expriatory wheezes throughout   CVS:  No chest wall tenderness/ deformity/crepitus; RRR; S1,S2 normal; No m/r/g   GI:   Abdomen Soft, non-tender.(+) B. S x4; No masses/ organomegaly/ paradox   Extremities: Atraumatic; No cyanosis/clubbing/edema    Pulses: 1-2+ and symmetric all extremities.    Skin: Skin color, texture, turgor normal. No rashes or lesions   Lymph nodes: Cervical, supraclavicular, and axillary nodes normal.   Neurologic: Grossly nonfocal          Data review:   Labs:  Recent Results (from the past 24 hour(s))   METABOLIC PANEL, COMPREHENSIVE    Collection Time: 01/10/21  6:35 AM   Result Value Ref Range    Sodium 139 136 - 145 mmol/L    Potassium 4.5 3.5 - 5.5 mmol/L    Chloride 106 100 - 111 mmol/L    CO2 27 21 - 32 mmol/L    Anion gap 6 3.0 - 18 mmol/L    Glucose 115 (H) 74 - 99 mg/dL    BUN 23 (H) 7.0 - 18 MG/DL    Creatinine 1.27 0.6 - 1.3 MG/DL    BUN/Creatinine ratio 18 12 - 20      GFR est AA 53 (L) >60 ml/min/1.73m2    GFR est non-AA 44 (L) >60 ml/min/1.73m2    Calcium 8.7 8.5 - 10.1 MG/DL    Bilirubin, total 0.3 0.2 - 1.0 MG/DL    ALT (SGPT) 22 13 - 56 U/L    AST (SGOT) 27 10 - 38 U/L    Alk. phosphatase 78 45 - 117 U/L    Protein, total 6.7 6.4 - 8.2 g/dL    Albumin 3.0 (L) 3.4 - 5.0 g/dL    Globulin 3.7 2.0 - 4.0 g/dL    A-G Ratio 0.8 0.8 - 1.7     CBC WITH AUTOMATED DIFF    Collection Time: 01/10/21  6:35 AM   Result Value Ref Range    WBC 14.8 (H) 4.6 - 13.2 K/uL    RBC 3.81 (L) 4.20 - 5.30 M/uL    HGB 11.0 (L) 12.0 - 16.0 g/dL    HCT 34.1 (L) 35.0 - 45.0 %    MCV 89.5 74.0 - 97.0 FL    MCH 28.9 24.0 - 34.0 PG    MCHC 32.3 31.0 - 37.0 g/dL    RDW 13.2 11.6 - 14.5 %    PLATELET 392 894 - 092 K/uL    MPV 11.1 9.2 - 11.8 FL    NEUTROPHILS 90 (H) 40 - 73 %    LYMPHOCYTES 7 (L) 21 - 52 %    MONOCYTES 3 3 - 10 %    EOSINOPHILS 0 0 - 5 %    BASOPHILS 0 0 - 2 %    ABS. NEUTROPHILS 13.3 (H) 1.8 - 8.0 K/UL    ABS. LYMPHOCYTES 1.1 0.9 - 3.6 K/UL    ABS. MONOCYTES 0.4 0.05 - 1.2 K/UL    ABS. EOSINOPHILS 0.0 0.0 - 0.4 K/UL    ABS.  BASOPHILS 0.0 0.0 - 0.1 K/UL    DF AUTOMATED     MAGNESIUM    Collection Time: 01/10/21  6:35 AM   Result Value Ref Range    Magnesium 2.2 1.6 - 2.6 mg/dL     Endo:  Lab Results   Component Value Date/Time    TSH 0.18 (L) 09/27/2019 10:50 PM    Triiodothyronine (T3), free 2.3 11/19/2015 06:15 AM    T4, Free 1.1 09/27/2019 10:50 PM     Lab Results   Component Value Date/Time    Hemoglobin A1c 5.3 09/07/2020 06:45 AM     Cardio:  Lab Results   Component Value Date/Time    BNP 19.9 12/07/2015 05:23 PM    BNP 3.4 11/01/2015 03:20 PM    NT pro-BNP 1,590 (H) 01/08/2021 01:56 AM    NT pro-BNP 1,001 (H) 12/31/2020 07:38 AM    NT pro-BNP 3,649 (H) 12/28/2020 04:14 AM    NT pro-BNP 4,221 (H) 12/22/2020 11:21 PM    NT pro- 12/20/2020 07:45 AM     Lab Results   Component Value Date/Time    CK 65 01/09/2021 04:49 PM    CK - MB 2.5 01/09/2021 04:49 PM    CK-MB Index 3.8 01/09/2021 04:49 PM    Troponin-I <0.015 12/07/2015 05:23 PM    Troponin-I, QT <0.02 01/09/2021 04:49 PM    BNP 19.9 12/07/2015 05:23 PM         10/19/20   ECHO ADULT FOLLOW-UP OR LIMITED 10/19/2020 10/19/2020    Narrative · LV: Estimated LVEF is 60 - 65%. Visually measured ejection fraction. Normal cavity size and systolic function (ejection fraction normal). Moderate concentric hypertrophy. Wall motion: normal.  · PA: Pulmonary arterial systolic pressure is 25 mmHg.         Signed by: Emelyn Aldrich MD       MICRO:  Results     Procedure Component Value Units Date/Time    CULTURE, MRSA [[id number]]     Order Status: Sent Specimen: Nasal from Nares     LEGIONELLA PNEUMOPHILA AG, URINE [[id number]]     Order Status: Sent Specimen: Urine     STREP PNEUMO AG, URINE [[id number]]     Order Status: Sent Specimen: Urine     CULTURE, RESPIRATORY/SPUTUM/BRONCH Georgeanne Cashing [[id number]]     Order Status: Sent Specimen: Sputum     RESPIRATORY VIRUS PANEL W/COVID-19, PCR [[id number]]     Order Status: Sent Specimen: NASOPHARYNGEAL SWAB     CULTURE, BLOOD [[id number]] Collected: 01/08/21 0321    Order Status: Completed Specimen: Blood Updated: 01/10/21 1207     Special Requests: NO SPECIAL REQUESTS        Culture result: NO GROWTH 2 DAYS       CULTURE, BLOOD [[id number]] Collected: 01/08/21 0305    Order Status: Completed Specimen: Blood Updated: 01/10/21 1207     Special Requests: NO SPECIAL REQUESTS        Culture result: NO GROWTH 2 DAYS                   Imaging:    CXR [21]: FINDINGS:      Mild haziness in the left midlung field. Mild septal thickening right lung  base. . The cardiomediastinal silhouette is unremarkable. Pulmonary vascularity  is normal, however. There are no effusions. IMPRESSION:     Likely mild edema. Pneumonitis less likely. Correlate clinically and consider follow-up      I have personally reviewed the patients radiographs and have reviewed the reports: Maxine Barajas PA-C        High complexity decision making was performed in this consultation and evaluation of this patient who is at high risk for decompensation with multiple organ involvement  More than 50% time spent in coordination of plan and counseling. Maxine Barajas PA-C  01/10/21    Pulmonary Critical Care Medicine  [facility]        [facility] Staff Addendum     I have independently evaluated the patient and reviewed the patient's chart. I agree with the above evaluation, assessment and recommendations along with the following comments and observations. Please also review my orders. The patient was evaluated in the ED. Patient resting on stretcher and on supplemental oxygen. Patient reports 2-3 weeks of bright yellow sputum production- she had a sample that she coughed into a  Cup ext to her. She denies hemoptysis. She has chest pain with coughing. She has been coughing for about 2-3 weeks. She reports chills and night sweats. No objective fevers. No syncope. No nausea or emesis. She reports 30 pound weight loss since the death of her mother and brother nearly 4 months ago. She states she does not eat or sleep well. Patient endorses smoking and taking methadone but denies other substances. UDS last month + for cocaine. Patient reports that her brother and mother   from medical illness about 4 months ago at [facility] She also reports her 33 y/o daughter  about one year ago from an aneurysm at [facility].   She reports that she has been struggling with racing thoughts and coping with this loss. She states that she lives alone in her home that she use to share with brother and mother. She states she does not leave home much due to her respiratory status. She does smoke at home which she attributes to anxiety. She does use oxygen but she states that she does not smoke around her oxygen. She reports waking up gasping for air at night. She has an older PAP device which she states has mildew and she has not uses for an extended period of time. She states she just got new oxygen set up but does not know the DME. She does have a nebulizer at home and reports getting new refill of albuterol nebs yesterday    She has an albuterol MDI which she reports that she has been taking frequently (\"50 times per day)     She recently left [facility] AMA because she was only getting breathing treatments Q4 hrs and she states she felt she needed more than that. Etiology of respiratory exacerbation is more than likely multi-factorial: excessive beta agonist use, smoking, poor compliance, possible infection, anxiety, VCD, etc.    Patient with leukocytosis and left shift, yellow sputum production. Need to expand antibiotic coverage. Will add Zithromax for atypical coverage. Agree with [employer] to explore other possible infectious etiologies , including COVID-19. Complete infectious workup. Check MRSA swab    Would not start Daliresp at this time due to compliance and cost issues. Ok to continue Singulair. High dose IV steroids x 24 hrs with plan to taper. UDS ordered and pending at this time. Monitor for withdrawal physiolgoy          Complex decision making was made in the evaluation and management plans during this consultation. More than 50% of time was spent in counseling and coordination of care including review of data and discussion with other team members. Further recommendations and therapies pending clinical course. Sherlyn Botello DO, [facility]  Pulmonary, Sleep and Critical Care Medicine  7:58 PM

## 2021-01-10 NOTE — ED NOTES
Rounded on pt   Pt alert and oriented x 4   Pt requested to remove the monitor   Informed her of the benefits of the monitor   Pt declined and proceeded to remove the leads  Provider aware   Denied pain   Requested not to be disturbed until she calls   Informed her of 4 am lab work and pt declined to be woken up   Pt updated about plan of care  Laying in bed with call bell in reach  Will continue to monitor and assess

## 2021-01-10 NOTE — ED NOTES
Rounded on pt   Pt resting in bed with eyes closed   No obvious distress   Breathing regular and unlabored   Laying in bed with call bell in reach  Dimmed lights for comfort   Will continue to monitor and assess

## 2021-01-10 NOTE — PROGRESS NOTES
Patient refused to allow me to draw the arterial blood gas as ordered. When explained the reasoning behind the blood gas, patient still refuses.

## 2021-01-10 NOTE — ED NOTES
Assumed care of pt from Jaqueline Barajas   Introduced self to pt  Pt alert and oriented x 4   Sitting up in bed drinking coffee  Pt denied problems swallowing   No drooling noted no coughing while eating  Updated whiteboard and explained usage  Connected to monitor   Pt lung sounds wheezy upon expiration  Pt c/o of wetness in throat and itching   Informed Dr Luisa Gregg received    Pt on NC 3 L   Positioned in bed for comfort  Will continue to monitor and assess

## 2021-01-10 NOTE — PROGRESS NOTES
[facility]  [facility]    Patient: Leanna Gordon Age: 63 y.o. : 1965 MR#: [id number] [id number]: [id number]  Date/Time: 1/10/2021    Subjective:     Pt states she has pain in legs and chest, somnolent. Per respiratory notes, refused am ABG. Assessment/Plan:   63year old female w/ history of COPD/chronic hypoxic respiratory failure, history of heroin, cocaine abuse on methadone per report with frequent presentations for respiratory complaints admitted after she returned to the ED following  E  Ave leave with complaints of shortness of breath.    -Respiratory distress likely due to COPD/asthma exacerbation versus hypertensive emergency given blood pressure reading of 260/120 by medics prior to her second presentation to the ED. Patient refusing arterial blood gas making analysis of her respiratory status difficult. Per nurse, pt takes off 02 while sleeping and gets tachypneic but maintains sats around 100. .  Currently on antibiotics and IV steroids. Chest x-ray read as mild edema, her BNP is elevated at 1590. Concern for pulmonary edema likely due to diastolic heart failure attributing to her respiratory distress. Rapid Covid done 2 days ago on  was negative.    -Concern for diastolic heart failure given elevated proBNP and chest x-ray findings    -Hypertensive crisis: Patient found to have blood pressure 260/120 upon initial evaluation by medics when she was brought back to the emergency room: bp's much lower.    -Prolonged QTC noted during her previous presentation to the ED within the last 24 hours. Reportedly on methadone. Cardiology input noted, appreciate assistance.    -Leukocytosis: Likely due to steroids, Infection source if present is unclear. Possibly pulmonary source given her respiratory complaints. Chest x-ray read as mild edema. On abx, White count is downtrending compared to lab studies done yesterday.     HISTORY OF:    -COPD/asthma  -Sleep apnea  -Polysubstance abuse  -Depression  -Drug seeking behavior  -Goiter        PLAN:  -duo nebs q4  -cont IV steroids  -cont 02 support  -cont abx for now  -avoid QTc prolonging drugs  -pulmonary consult  -lasix trial      Additional Notes:      Case discussed with:  [x]Patient  []Family  []Nursing  []Case Management  DVT Prophylaxis:  []Lovenox  []Hep SQ  []SCDs  []Coumadin   []On Heparin gtt    Objective:   VS:   Visit Vitals  BP (!) 106/53   Pulse 92   Temp 98.1 °F (36.7 °C)   Resp 21   LMP 2009   SpO2 100%      Tmax/24hrs: Temp (24hrs), Av.1 °F (36.7 °C), Min:98.1 °F (36.7 °C), Max:98.1 °F (36.7 °C)    Input/Output: No intake or output data in the 24 hours ending 01/10/21 1418    General: somnolent, in NAD   Cardiovascular:  Rrr, no murmurs   Pulmonary:  ctab  GI:  Soft, nt, nd  Extremities:  No edema  Additional:      Labs:    Recent Results (from the past 24 hour(s))   CBC WITH AUTOMATED DIFF    Collection Time: 21  4:49 PM   Result Value Ref Range    WBC 17.9 (H) 4.6 - 13.2 K/uL    RBC 4.37 4.20 - 5.30 M/uL    HGB 13.0 12.0 - 16.0 g/dL    HCT 39.4 35.0 - 45.0 %    MCV 90.2 74.0 - 97.0 FL    MCH 29.7 24.0 - 34.0 PG    MCHC 33.0 31.0 - 37.0 g/dL    RDW 13.2 11.6 - 14.5 %    PLATELET 645 844 - 137 K/uL    MPV 11.0 9.2 - 11.8 FL    NEUTROPHILS 88 (H) 42 - 75 %    BAND NEUTROPHILS 3 0 - 5 %    LYMPHOCYTES 6 (L) 20 - 51 %    MONOCYTES 3 2 - 9 %    EOSINOPHILS 0 0 - 5 %    BASOPHILS 0 0 - 3 %    ABS. NEUTROPHILS 16.3 (H) 1.8 - 8.0 K/UL    ABS. LYMPHOCYTES 1.1 0.8 - 3.5 K/UL    ABS. MONOCYTES 0.5 0 - 1.0 K/UL    ABS. EOSINOPHILS 0.0 0.0 - 0.4 K/UL    ABS.  BASOPHILS 0.0 0.0 - 0.06 K/UL    DF MANUAL      PLATELET COMMENTS ADEQUATE PLATELETS      RBC COMMENTS NORMOCYTIC, NORMOCHROMIC     METABOLIC PANEL, BASIC    Collection Time: 21  4:49 PM   Result Value Ref Range    Sodium 141 136 - 145 mmol/L    Potassium 3.8 3.5 - 5.5 mmol/L    Chloride 106 100 - 111 mmol/L    CO2 28 21 - 32 mmol/L    Anion gap 7 3.0 - 18 mmol/L    Glucose 146 (H) 74 - 99 mg/dL    BUN 18 7.0 - 18 MG/DL    Creatinine 1.35 (H) 0.6 - 1.3 MG/DL    BUN/Creatinine ratio 13 12 - 20      GFR est AA 49 (L) >60 ml/min/1.73m2    GFR est non-AA 41 (L) >60 ml/min/1.73m2    Calcium 9.0 8.5 - 10.1 MG/DL   CARDIAC PANEL,(CK, CKMB & TROPONIN)    Collection Time: 01/09/21  4:49 PM   Result Value Ref Range    CK - MB 2.5 <3.6 ng/ml    CK-MB Index 3.8 0.0 - 4.0 %    CK 65 26 - 192 U/L    Troponin-I, QT <0.02 0.0 - 0.045 NG/ML   EKG, 12 LEAD, SUBSEQUENT    Collection Time: 01/09/21  5:14 PM   Result Value Ref Range    Ventricular Rate 99 BPM    Atrial Rate 99 BPM    P-R Interval 122 ms    QRS Duration 78 ms    Q-T Interval 384 ms    QTC Calculation (Bezet) 492 ms    Calculated P Axis 60 degrees    Calculated R Axis 38 degrees    Calculated T Axis 65 degrees    Diagnosis       Normal sinus rhythm  Moderate voltage criteria for LVH, may be normal variant  Nonspecific T wave abnormality  Prolonged QT  Abnormal ECG  When compared with ECG of 08-JAN-2021 15:48,  No significant change was found     METABOLIC PANEL, COMPREHENSIVE    Collection Time: 01/10/21  6:35 AM   Result Value Ref Range    Sodium 139 136 - 145 mmol/L    Potassium 4.5 3.5 - 5.5 mmol/L    Chloride 106 100 - 111 mmol/L    CO2 27 21 - 32 mmol/L    Anion gap 6 3.0 - 18 mmol/L    Glucose 115 (H) 74 - 99 mg/dL    BUN 23 (H) 7.0 - 18 MG/DL    Creatinine 1.27 0.6 - 1.3 MG/DL    BUN/Creatinine ratio 18 12 - 20      GFR est AA 53 (L) >60 ml/min/1.73m2    GFR est non-AA 44 (L) >60 ml/min/1.73m2    Calcium 8.7 8.5 - 10.1 MG/DL    Bilirubin, total 0.3 0.2 - 1.0 MG/DL    ALT (SGPT) 22 13 - 56 U/L    AST (SGOT) 27 10 - 38 U/L    Alk.  phosphatase 78 45 - 117 U/L    Protein, total 6.7 6.4 - 8.2 g/dL    Albumin 3.0 (L) 3.4 - 5.0 g/dL    Globulin 3.7 2.0 - 4.0 g/dL    A-G Ratio 0.8 0.8 - 1.7     CBC WITH AUTOMATED DIFF    Collection Time: 01/10/21  6:35 AM   Result Value Ref Range WBC 14.8 (H) 4.6 - 13.2 K/uL    RBC 3.81 (L) 4.20 - 5.30 M/uL    HGB 11.0 (L) 12.0 - 16.0 g/dL    HCT 34.1 (L) 35.0 - 45.0 %    MCV 89.5 74.0 - 97.0 FL    MCH 28.9 24.0 - 34.0 PG    MCHC 32.3 31.0 - 37.0 g/dL    RDW 13.2 11.6 - 14.5 %    PLATELET 933 065 - 306 K/uL    MPV 11.1 9.2 - 11.8 FL    NEUTROPHILS 90 (H) 40 - 73 %    LYMPHOCYTES 7 (L) 21 - 52 %    MONOCYTES 3 3 - 10 %    EOSINOPHILS 0 0 - 5 %    BASOPHILS 0 0 - 2 %    ABS. NEUTROPHILS 13.3 (H) 1.8 - 8.0 K/UL    ABS. LYMPHOCYTES 1.1 0.9 - 3.6 K/UL    ABS. MONOCYTES 0.4 0.05 - 1.2 K/UL    ABS. EOSINOPHILS 0.0 0.0 - 0.4 K/UL    ABS.  BASOPHILS 0.0 0.0 - 0.1 K/UL    DF AUTOMATED     MAGNESIUM    Collection Time: 01/10/21  6:35 AM   Result Value Ref Range    Magnesium 2.2 1.6 - 2.6 mg/dL     Additional Data Reviewed:      Signed By: John Wade MD     January 10, 2021 2:18 PM

## 2021-01-10 NOTE — ED NOTES
Rounded on pt   Pt alert and oriented x 4   Denied any pain   Denied swallowing difficulty  Pt talking clearly and able to handle secretions  Denied food allergies  Brought pt food and drink as per dr. Diamond Ashley bedside table at bedside  Pt eating sitting in bed  Pt on monitor ST without ectopy   Informed pt about plan of care   Pt wanted to hold benadryl until after dinner   Will continue to monitor and assess

## 2021-01-11 LAB
ALBUMIN SERPL-MCNC: 3.1 G/DL (ref 3.4–5)
ALBUMIN/GLOB SERPL: 0.9 {RATIO} (ref 0.8–1.7)
ALP SERPL-CCNC: 82 U/L (ref 45–117)
ALT SERPL-CCNC: 24 U/L (ref 13–56)
ANION GAP SERPL CALC-SCNC: 6 MMOL/L (ref 3–18)
APAP SERPL-MCNC: <2 UG/ML (ref 10–30)
AST SERPL-CCNC: 28 U/L (ref 10–38)
B PERT DNA SPEC QL NAA+PROBE: NOT DETECTED
BASOPHILS # BLD: 0 K/UL (ref 0–0.1)
BASOPHILS NFR BLD: 0 % (ref 0–2)
BILIRUB SERPL-MCNC: 1.4 MG/DL (ref 0.2–1)
BNP SERPL-MCNC: 505 PG/ML (ref 0–900)
BORDETELLA PARAPERTUSSIS PCR, BORPAR: NOT DETECTED
BUN SERPL-MCNC: 25 MG/DL (ref 7–18)
BUN/CREAT SERPL: 22 (ref 12–20)
C PNEUM DNA SPEC QL NAA+PROBE: NOT DETECTED
CALCIUM SERPL-MCNC: 9.1 MG/DL (ref 8.5–10.1)
CHLORIDE SERPL-SCNC: 105 MMOL/L (ref 100–111)
CO2 SERPL-SCNC: 29 MMOL/L (ref 21–32)
CREAT SERPL-MCNC: 1.12 MG/DL (ref 0.6–1.3)
DIFFERENTIAL METHOD BLD: ABNORMAL
EOSINOPHIL # BLD: 0 K/UL (ref 0–0.4)
EOSINOPHIL NFR BLD: 0 % (ref 0–5)
ERYTHROCYTE [DISTWIDTH] IN BLOOD BY AUTOMATED COUNT: 13.3 % (ref 11.6–14.5)
ETHANOL SERPL-MCNC: <3 MG/DL (ref 0–3)
FLUAV SUBTYP SPEC NAA+PROBE: NOT DETECTED
FLUBV RNA SPEC QL NAA+PROBE: NOT DETECTED
GLOBULIN SER CALC-MCNC: 3.6 G/DL (ref 2–4)
GLUCOSE SERPL-MCNC: 113 MG/DL (ref 74–99)
HADV DNA SPEC QL NAA+PROBE: NOT DETECTED
HCOV 229E RNA SPEC QL NAA+PROBE: NOT DETECTED
HCOV HKU1 RNA SPEC QL NAA+PROBE: NOT DETECTED
HCOV NL63 RNA SPEC QL NAA+PROBE: NOT DETECTED
HCOV OC43 RNA SPEC QL NAA+PROBE: NOT DETECTED
HCT VFR BLD AUTO: 38.4 % (ref 35–45)
HGB BLD-MCNC: 12.2 G/DL (ref 12–16)
HMPV RNA SPEC QL NAA+PROBE: NOT DETECTED
HPIV1 RNA SPEC QL NAA+PROBE: NOT DETECTED
HPIV2 RNA SPEC QL NAA+PROBE: NOT DETECTED
HPIV3 RNA SPEC QL NAA+PROBE: NOT DETECTED
HPIV4 RNA SPEC QL NAA+PROBE: NOT DETECTED
LYMPHOCYTES # BLD: 1 K/UL (ref 0.9–3.6)
LYMPHOCYTES NFR BLD: 7 % (ref 21–52)
M PNEUMO DNA SPEC QL NAA+PROBE: NOT DETECTED
MCH RBC QN AUTO: 28.8 PG (ref 24–34)
MCHC RBC AUTO-ENTMCNC: 31.8 G/DL (ref 31–37)
MCV RBC AUTO: 90.8 FL (ref 74–97)
MONOCYTES # BLD: 0.4 K/UL (ref 0.05–1.2)
MONOCYTES NFR BLD: 3 % (ref 3–10)
NEUTS SEG # BLD: 12.8 K/UL (ref 1.8–8)
NEUTS SEG NFR BLD: 90 % (ref 40–73)
PLATELET # BLD AUTO: 249 K/UL (ref 135–420)
PMV BLD AUTO: 11.4 FL (ref 9.2–11.8)
POTASSIUM SERPL-SCNC: 5.2 MMOL/L (ref 3.5–5.5)
PROCALCITONIN SERPL-MCNC: 0.19 NG/ML
PROT SERPL-MCNC: 6.7 G/DL (ref 6.4–8.2)
RBC # BLD AUTO: 4.23 M/UL (ref 4.2–5.3)
RSV RNA SPEC QL NAA+PROBE: NOT DETECTED
RV+EV RNA SPEC QL NAA+PROBE: DETECTED
SALICYLATES SERPL-MCNC: <1.7 MG/DL (ref 2.8–20)
SARS-COV-2 PCR, COVPCR: NOT DETECTED
SODIUM SERPL-SCNC: 140 MMOL/L (ref 136–145)
WBC # BLD AUTO: 14.2 K/UL (ref 4.6–13.2)

## 2021-01-11 PROCEDURE — 80053 COMPREHEN METABOLIC PANEL: CPT

## 2021-01-11 PROCEDURE — 94640 AIRWAY INHALATION TREATMENT: CPT

## 2021-01-11 PROCEDURE — 74011000250 HC RX REV CODE- 250: Performed by: HOSPITALIST

## 2021-01-11 PROCEDURE — 83880 ASSAY OF NATRIURETIC PEPTIDE: CPT

## 2021-01-11 PROCEDURE — 87070 CULTURE OTHR SPECIMN AEROBIC: CPT

## 2021-01-11 PROCEDURE — 74011250637 HC RX REV CODE- 250/637: Performed by: INTERNAL MEDICINE

## 2021-01-11 PROCEDURE — 94667 MNPJ CHEST WALL 1ST: CPT

## 2021-01-11 PROCEDURE — 97165 OT EVAL LOW COMPLEX 30 MIN: CPT

## 2021-01-11 PROCEDURE — 77010033678 HC OXYGEN DAILY

## 2021-01-11 PROCEDURE — 96376 TX/PRO/DX INJ SAME DRUG ADON: CPT

## 2021-01-11 PROCEDURE — 74011000250 HC RX REV CODE- 250: Performed by: INTERNAL MEDICINE

## 2021-01-11 PROCEDURE — 74011250637 HC RX REV CODE- 250/637: Performed by: EMERGENCY MEDICINE

## 2021-01-11 PROCEDURE — 74011000250 HC RX REV CODE- 250: Performed by: PHYSICIAN ASSISTANT

## 2021-01-11 PROCEDURE — 80143 DRUG ASSAY ACETAMINOPHEN: CPT

## 2021-01-11 PROCEDURE — 74011250636 HC RX REV CODE- 250/636: Performed by: HOSPITALIST

## 2021-01-11 PROCEDURE — 94660 CPAP INITIATION&MGMT: CPT

## 2021-01-11 PROCEDURE — 85025 COMPLETE CBC W/AUTO DIFF WBC: CPT

## 2021-01-11 PROCEDURE — 99232 SBSQ HOSP IP/OBS MODERATE 35: CPT | Performed by: INTERNAL MEDICINE

## 2021-01-11 PROCEDURE — 77030027138 HC INCENT SPIROMETER -A

## 2021-01-11 PROCEDURE — 2709999900 HC NON-CHARGEABLE SUPPLY

## 2021-01-11 PROCEDURE — 82077 ASSAY SPEC XCP UR&BREATH IA: CPT

## 2021-01-11 PROCEDURE — 74011250636 HC RX REV CODE- 250/636: Performed by: INTERNAL MEDICINE

## 2021-01-11 PROCEDURE — 74011250637 HC RX REV CODE- 250/637: Performed by: HOSPITALIST

## 2021-01-11 PROCEDURE — 84145 PROCALCITONIN (PCT): CPT

## 2021-01-11 PROCEDURE — 97161 PT EVAL LOW COMPLEX 20 MIN: CPT

## 2021-01-11 PROCEDURE — 65660000000 HC RM CCU STEPDOWN

## 2021-01-11 PROCEDURE — 99224 PR SBSQ OBSERVATION CARE/DAY 15 MINUTES: CPT | Performed by: INTERNAL MEDICINE

## 2021-01-11 PROCEDURE — 99233 SBSQ HOSP IP/OBS HIGH 50: CPT | Performed by: INTERNAL MEDICINE

## 2021-01-11 PROCEDURE — 80179 DRUG ASSAY SALICYLATE: CPT

## 2021-01-11 RX ORDER — DIPHENHYDRAMINE HYDROCHLORIDE 50 MG/ML
25 INJECTION, SOLUTION INTRAMUSCULAR; INTRAVENOUS
Status: DISCONTINUED | OUTPATIENT
Start: 2021-01-11 | End: 2021-01-13

## 2021-01-11 RX ORDER — ARFORMOTEROL TARTRATE 15 UG/2ML
15 SOLUTION RESPIRATORY (INHALATION)
Status: DISCONTINUED | OUTPATIENT
Start: 2021-01-11 | End: 2021-01-18 | Stop reason: HOSPADM

## 2021-01-11 RX ORDER — FUROSEMIDE 20 MG/1
20 TABLET ORAL DAILY
Status: DISCONTINUED | OUTPATIENT
Start: 2021-01-12 | End: 2021-01-13

## 2021-01-11 RX ORDER — BUDESONIDE 0.5 MG/2ML
1000 INHALANT ORAL
Status: DISCONTINUED | OUTPATIENT
Start: 2021-01-11 | End: 2021-01-18 | Stop reason: HOSPADM

## 2021-01-11 RX ADMIN — FAMOTIDINE 20 MG: 20 TABLET, FILM COATED ORAL at 08:27

## 2021-01-11 RX ADMIN — Medication 10 ML: at 13:31

## 2021-01-11 RX ADMIN — ATORVASTATIN CALCIUM 20 MG: 20 TABLET, FILM COATED ORAL at 21:54

## 2021-01-11 RX ADMIN — IPRATROPIUM BROMIDE AND ALBUTEROL SULFATE 3 ML: .5; 3 SOLUTION RESPIRATORY (INHALATION) at 06:14

## 2021-01-11 RX ADMIN — AMLODIPINE BESYLATE 10 MG: 10 TABLET ORAL at 08:27

## 2021-01-11 RX ADMIN — GUAIFENESIN 600 MG: 600 TABLET, EXTENDED RELEASE ORAL at 21:54

## 2021-01-11 RX ADMIN — CEFEPIME 2 G: 2 INJECTION, POWDER, FOR SOLUTION INTRAVENOUS at 23:48

## 2021-01-11 RX ADMIN — GUAIFENESIN AND DEXTROMETHORPHAN 10 ML: 100; 10 SYRUP ORAL at 07:00

## 2021-01-11 RX ADMIN — CEFEPIME 2 G: 2 INJECTION, POWDER, FOR SOLUTION INTRAVENOUS at 08:31

## 2021-01-11 RX ADMIN — Medication 10 ML: at 06:20

## 2021-01-11 RX ADMIN — AZITHROMYCIN DIHYDRATE 500 MG: 500 INJECTION, POWDER, LYOPHILIZED, FOR SOLUTION INTRAVENOUS at 21:53

## 2021-01-11 RX ADMIN — ALBUTEROL SULFATE 2.5 MG: 1.25 SOLUTION RESPIRATORY (INHALATION) at 13:43

## 2021-01-11 RX ADMIN — Medication 10 ML: at 21:55

## 2021-01-11 RX ADMIN — IPRATROPIUM BROMIDE AND ALBUTEROL SULFATE 3 ML: .5; 3 SOLUTION RESPIRATORY (INHALATION) at 07:00

## 2021-01-11 RX ADMIN — LISINOPRIL 20 MG: 20 TABLET ORAL at 08:27

## 2021-01-11 RX ADMIN — ASPIRIN 81 MG: 81 TABLET, COATED ORAL at 08:27

## 2021-01-11 RX ADMIN — GUAIFENESIN 600 MG: 600 TABLET, EXTENDED RELEASE ORAL at 08:27

## 2021-01-11 RX ADMIN — METHYLPREDNISOLONE SODIUM SUCCINATE 60 MG: 40 INJECTION, POWDER, FOR SOLUTION INTRAMUSCULAR; INTRAVENOUS at 12:55

## 2021-01-11 RX ADMIN — METHYLPREDNISOLONE SODIUM SUCCINATE 60 MG: 40 INJECTION, POWDER, FOR SOLUTION INTRAMUSCULAR; INTRAVENOUS at 18:28

## 2021-01-11 RX ADMIN — CLONIDINE HYDROCHLORIDE 0.2 MG: 0.1 TABLET ORAL at 08:26

## 2021-01-11 RX ADMIN — ARFORMOTEROL TARTRATE 15 MCG: 15 SOLUTION RESPIRATORY (INHALATION) at 20:00

## 2021-01-11 RX ADMIN — METHYLPREDNISOLONE SODIUM SUCCINATE 60 MG: 40 INJECTION, POWDER, FOR SOLUTION INTRAMUSCULAR; INTRAVENOUS at 23:49

## 2021-01-11 RX ADMIN — CEFEPIME 2 G: 2 INJECTION, POWDER, FOR SOLUTION INTRAVENOUS at 18:28

## 2021-01-11 RX ADMIN — IPRATROPIUM BROMIDE AND ALBUTEROL SULFATE 3 ML: .5; 3 SOLUTION RESPIRATORY (INHALATION) at 16:44

## 2021-01-11 RX ADMIN — CLONIDINE HYDROCHLORIDE 0.2 MG: 0.1 TABLET ORAL at 18:27

## 2021-01-11 RX ADMIN — METHYLPREDNISOLONE SODIUM SUCCINATE 60 MG: 40 INJECTION, POWDER, FOR SOLUTION INTRAMUSCULAR; INTRAVENOUS at 08:15

## 2021-01-11 RX ADMIN — DIPHENHYDRAMINE HYDROCHLORIDE 25 MG: 50 INJECTION, SOLUTION INTRAMUSCULAR; INTRAVENOUS at 09:47

## 2021-01-11 RX ADMIN — MONTELUKAST 10 MG: 10 TABLET, FILM COATED ORAL at 21:54
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
## 2021-01-11 NOTE — ED NOTES
Patient pulled out her IV stating that it was burning. Staff will attempt to gain new access when patient is more cooperative.  Will continue to monitor

## 2021-01-11 NOTE — PROGRESS NOTES
TRANSFER - IN REPORT:    Verbal report received from ROSALINE Andujar(name) on Adrián Walker  being received from ED(unit) for routine progression of care      Report consisted of patients Situation, Background, Assessment and   Recommendations(SBAR). Information from the following report(s) SBAR, Kardex and MAR was reviewed with the receiving nurse. Opportunity for questions and clarification was provided. Assessment completed upon patients arrival to unit and care assumed.

## 2021-01-11 NOTE — PROGRESS NOTES
[facility]  Pulmonary, Critical Care, and Sleep Medicine    Name: Sandee Looney MRN: [id number]   : 1965 Hospital: [address] [place] [address]   Date: 2021        Subjective/Interval History:     HPI:  Patient is a 63 y.o. female, current smoker, with PMH of COPD/Asthma, Vocal Cord Dysfunction, CHF, CKD, HTN, non-compliance, polysubstance abuse, and depression who presented [facility] ED on 21 c/p progressive worsening SOB/NEETA with productive cough x2 weeks PTA. Of note, pt was seen and admitted to hospital for same complaint on  and left AMA after becoming displeased with nursing staff upon admission because they \"wouldn't give her her albuterol nebs more that q4 hours and she felt that she needed them more than that\", per patient.      She presented back to ED on  for continued worsening SOB/NEETA. She again has been admitted to Hospitalist service for COPD exacerbation and was started on nebs, steroids and ABX. PCCM consulted for evaluation. Rapid COVID-19 (-) on 21. COVID-19 PCR (-) on 20. She was also just admitted to [facility] on  for similar respiratory complaints.     She was previously followed in / by Dr. Cristhian Drake -Pulmonary at [facility]- Some notes available for review. Notes refer to history of asthma/COPD, and VCD which responded well to SLP.  Patient pacing the halls during other hospitalizations,  missed appointments and reports of concern for possible malingering and pain seeking behaviors- Further details can be reviewed in care everywhere     PFTs from 2015 in Care Everywhere notes mixed restrictive and reversible obstruction and FeV1 of 0.95% (27% predicted)- unclear if there were any technical limitations or how reflective this result is.      21:  - Found pt ambulating around unit on RA but still complains of \"SOB\" --> currently pt does not have any dyspnea, conversational or exertional  - HD stable  - States she didn't wear BiPAP overnight because \"RT never put her on it\". - Adequate UOP  - No new complaints      ROS:Pertinent items are noted in HPI. Objective:   Vital Signs:    Visit Vitals  BP (!) 146/89 (BP 1 Location: Left arm, BP Patient Position: At rest)   Pulse 86   Temp 97.7 °F (36.5 °C)   Resp 22   LMP 2009   SpO2 98%       O2 Device: Nasal cannula   O2 Flow Rate (L/min): 3 l/min   Temp (24hrs), Av.7 °F (36.5 °C), Min:97.7 °F (36.5 °C), Max:97.7 °F (36.5 °C)       Intake/Output:   Last shift:      No intake/output data recorded. Last 3 shifts: No intake/output data recorded. No intake or output data in the 24 hours ending 21 1634     Physical Exam:   General:  Alert, cooperative, NAD; appears stated age. HEENT:  Normocephalic,atraumatic; PERRL; EOMI; Conjunctivae/corneas clear; Anicteric; nares/mucosa normal, moist; No drainage/sinus tenderness; Neck supple, symmetrical; trachea midline; No adenopathy; Thyroid - no enlargement/tenderness/nodules noted; No carotid bruit; No JVD   Back:   Symmetric, no curvature, no spine tenderness or flank pain   Lungs:   Symmetrical chest rise; good AE Bilat; diffuse expiratory wheezes. Chest wall:  No tenderness or deformity. NO CREPITUS   Heart:  RRR, S1, S2 normal, no m/r/g   Abdomen:   Soft, non-tender.(+) B. S x4; No masses/organomegaly/paradox   Extremities: Normal, atraumatic, no cyanosis or edema. Pulses: 1-2+ and symmetric all extremities.    Skin: Skin color, texture, turgor normal. No rashes or lesions   Lymph nodes: Cervical, supraclavicular, and axillary nodes normal.   Neurologic: Grossly nonfocal         DATA:  Labs:  Recent Labs     21  0604 01/10/21  0635 21  1649   WBC 14.2* 14.8* 17.9*   HGB 12.2 11.0* 13.0   HCT 38.4 34.1* 39.4    224 274     Recent Labs     21  0604 01/10/21  0635 21  1649    139 141   K 5.2 4.5 3.8    106 106   CO2 29 27 28   * 115* 146*   BUN 25* 23* 18   CREA 1.12 1.27 1.35* CA 9.1 8.7 9.0   MG  --  2.2  --    ALB 3.1* 3.0*  --    ALT 24 22  --      Imaging:  [x]I have personally reviewed the patients radiographs  []Radiographs reviewed with radiologist   []No change from prior, tubes and lines in adequate position  []Improved   []Worsening      CXR [01/09/21]: FINDINGS:      Mild haziness in the left midlung field. Mild septal thickening right lung  base. . The cardiomediastinal silhouette is unremarkable.  Pulmonary vascularity  is normal, however.  There are no effusions.     IMPRESSION:     Likely mild edema. Pneumonitis less likely. Correlate clinically and consider follow-up    IMPRESSION:   · COPD/Asthma Exacerbation - 2/2 Viral PNA. May also have bacterial component given elevated WBC with yellow productive sputum. On 4L home O2. Did follow with Anudrea Conner however admits she hasn't seen a pulmonologist in a while. COVID-19 (-) ([facility])  · (+) Rhinovirus and Enterovirus - Respiratory Panel (+)(01/10/21)  · (+) Nicotine Smoker - 1pk /1-2 wks  · Substance Abuse: reports taking methadone, denies other drug use: UDS + Cocaine and methadone 12/16/20  · R/O MY - States she has a PAP machine at home but it is full of mold and so she doesn't use it anymore --> needs a new device  · Hx of Vocal Cord Dysfunction Syndrome - Noted on Care-Everywhere- [place], Previously treated by Speech Therapy   · Hx of Non-compliance  · Hx of Thyroid Disorder  · Anxiety with Grief response: Reports death of brother and mother 4 months ago and 33 y/o daughter about one year ago    RECOMMENDATIONS:   · SpO2 goal >90%-94; titrate supp O2 PRN; currently resting on NC and ambulating around the unit on RA  · BiPAP qHS PRN   · Optimize bronchial hygiene. Encourage IS; add PEP device --> discussed with RT and pt for compliance  · Con't duo-nebs q4 for now; albuterol nebs q4 PRN   · Add BID Pulmicort and Brovana nebs  · Cont' Steroids - Solu-Medrol 60mg q6.  Will taper once out of acute phase --> pt still with diffuse expiratory wheeze (01/11)   · Con't Singulair, Mucinex and Robitussin   · Con't Azithromycin for COPD exacerbation x5 day course. ABX otherwise per primary. · Check Legionella and Strep Pneumo, MRSA swab and Sputum Cx --> Per RN, pt is refusing some tests at this time. · Agree scheduled diuretics - monitor I/O's. Avoid over diuresis  · Assess home Oxygen needs at discharge  · Encourage medication compliance  · OT, PT, OOB and ambulate  · Healthy weight  · Will Follow  · DVT, PUD prophylaxis per primary       High complexity decision making was performed during the evaluation of this patient at high risk for decompens ation with multiple organ involvement      Above mentioned total time spent on reviewing the case/medical record/data/notes/EMR/patient examination/documentation/coordinating care with nurse/consultants, exclusive of procedures with complex decision making performed and > 50% time spent in face to face evaluation. Ryanne Ellis PA-C   01/11/21    Pulmonary Critical Care Medicine  [facility]      Pulmonary / Critical Care Physician:    Chart and note reviewed. Data reviewed. Seen on rounds earlier today. I have independently evaluated and examined the patient. I agree with the exam, assessment and plans outlined by Loel Form. In brief, my findings, evaluation and recommendations are as stated below:    Pt states she is somewhat improved. Diffuse wheezes heard on auscultation. Continue IV steroids for now. Will add brovana, pulmicort. Q4 duonebs. Rest of details and diagnostic/treatment plans per APC note. I have personally reviewed all pertinent data including labs, imaging and recommendations of treatment team providers.         Alberto Iverson MD  8:35 PM

## 2021-01-11 NOTE — ED NOTES
Shift report given to Donnell Barros RN and Jaki (oncoming nurse) by Hannah WAGGONER (off going nurse)

## 2021-01-11 NOTE — ED NOTES
Shift report received from Devin, [facility]. Assumed care of patient. Received patient sitting up on stretcher, awake, alert, oriented x 4. Introduced myself as her primary nurse. Explanation of plan of care provided to the patient. C/O generalized itching and requesting for more Benadryl. Informed patient I will contact provider. Assessment in progress.
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
## 2021-01-11 NOTE — ED NOTES
Patient is verbally abusive to staff. Staff continues to try to accommodate the needs of the patient to no avail. This nurse has stopped entering the patient's room due to her aggressive, argumentative nature. Will continue to monitor from a safe distance.

## 2021-01-11 NOTE — ED NOTES
Patient ambulated out to the ED lobby independently, without her oxygen. States, \"I came out here to meet my sister. She's supposed to bring me my lunch. \"   C/O shortness of breath. Patient assisted back to room and placed back on oxygen via NC. Pulse ox on 3L/min via NC is 95%. Awaiting transport up to room 465.

## 2021-01-11 NOTE — PROGRESS NOTES
01/10/21 2350   Oxygen Therapy   Pulse via Oximetry 88 beats per minute   O2 Device Nasal cannula   O2 Flow Rate (L/min) 3 l/min   Patient states that she does not want to wear bipap at this time. Informed patient call for RT when she is ready.  Patient stated that she would

## 2021-01-11 NOTE — PROGRESS NOTES
Progress Note    Patient: Tabatha Marquis MRN: [id number]  CSN: [id number]    YOB: 1965  Age: 63 y.o. Sex: female    DOA: 1/9/2021 LOS:  LOS: 2 days                    Subjective:     Chief Complaint:   Chief Complaint   Patient presents with    Respiratory Distress     Patient reports mild symptomatic improvement. Continues to endorse inability to take deep breath comfortably and wheezing. Review of systems  General: No fevers or chills. Cardiovascular: No chest pain or pressure. No palpitations. Pulmonary: shortness of breath, wheeze  Gastrointestinal: No abdominal pain, nausea, vomiting or diarrhea. Genitourinary: No urinary frequency, urgency, hesitancy or dysuria. Musculoskeletal: No joint or muscle pain, no back pain, no recent trauma. Neurologic: No headache, numbness, tingling or weakness. Objective:     Physical Exam:  Visit Vitals  BP (!) 146/89 (BP 1 Location: Left arm, BP Patient Position: At rest)   Pulse 86   Temp 98.1 °F (36.7 °C)   Resp 22   SpO2 98%        General:         Alert, cooperative, no acute distress    HEENT: NC, Atraumatic. PERRLA, anicteric sclerae. Lungs: Diffuse expiratory wheezes, decreased air movement   Heart:  Regular  rhythm,  No murmur, No Rubs, No Gallops  Abdomen: Soft, Non distended, Non tender.  +Bowel sounds, no HSM  Extremities: No c/c/e  Psych:   Good insight. Not anxious or agitated. Neurologic:  CN 2-12 grossly intact, Alert and oriented X 3. Intake and Output:  Current Shift:  No intake/output data recorded. Last three shifts:  No intake/output data recorded.     Labs: Results:       Chemistry Recent Labs     01/11/21  0604 01/10/21  0635 01/09/21  1649   * 115* 146*    139 141   K 5.2 4.5 3.8    106 106   CO2 29 27 28   BUN 25* 23* 18   CREA 1.12 1.27 1.35*   CA 9.1 8.7 9.0   AGAP 6 6 7   BUCR 22* 18 13   AP 82 78  --    TP 6.7 6.7  --    ALB 3.1* 3.0*  --    GLOB 3.6 3.7  --    AGRAT 0.9 0.8  -- CBC w/Diff Recent Labs     01/11/21  0604 01/10/21  0635 01/09/21  1649   WBC 14.2* 14.8* 17.9*   RBC 4.23 3.81* 4.37   HGB 12.2 11.0* 13.0   HCT 38.4 34.1* 39.4    224 274   GRANS 90* 90* 88*   LYMPH 7* 7* 6*   EOS 0 0 0      Cardiac Enzymes Recent Labs     01/09/21  1649   CPK 65   CKND1 3.8      Coagulation No results for input(s): PTP, INR, APTT, INREXT in the last 72 hours. Lipid Panel No results found for: CHOL, CHOLPOCT, CHOLX, CHLST, CHOLV, 438981, HDL, HDLP, LDL, LDLC, DLDLP, 314761, VLDLC, VLDL, TGLX, TRIGL, TRIGP, TGLPOCT, CHHD, CHHDX   BNP No results for input(s): BNPP in the last 72 hours.    Liver Enzymes Recent Labs     01/11/21  0604   TP 6.7   ALB 3.1*   AP 82      Thyroid Studies Lab Results   Component Value Date/Time    TSH 0.18 (L) 09/27/2019 10:50 PM          Procedures/imaging: see electronic medical records for all procedures/Xrays and details which were not copied into this note but were reviewed prior to creation of Plan    Medications:   Current Facility-Administered Medications   Medication Dose Route Frequency    diphenhydrAMINE (BENADRYL) injection 25 mg  25 mg IntraVENous Q6H PRN    [START ON 1/12/2021] furosemide (LASIX) tablet 20 mg  20 mg Oral DAILY    amLODIPine (NORVASC) tablet 10 mg  10 mg Oral DAILY    aspirin delayed-release tablet 81 mg  81 mg Oral DAILY    atorvastatin (LIPITOR) tablet 20 mg  20 mg Oral QHS    famotidine (PEPCID) tablet 20 mg  20 mg Oral DAILY    lisinopriL (PRINIVIL, ZESTRIL) tablet 20 mg  20 mg Oral DAILY    montelukast (SINGULAIR) tablet 10 mg  10 mg Oral QHS    sodium chloride (NS) flush 5-40 mL  5-40 mL IntraVENous Q8H    sodium chloride (NS) flush 5-40 mL  5-40 mL IntraVENous PRN    acetaminophen (TYLENOL) tablet 650 mg  650 mg Oral Q6H PRN    Or    acetaminophen (TYLENOL) suppository 650 mg  650 mg Rectal Q6H PRN    polyethylene glycol (MIRALAX) packet 17 g  17 g Oral DAILY PRN    senna-docusate (PERICOLACE) 8.6-50 mg per tablet 1 Tab  1 Tab Oral BID    heparin (porcine) injection 5,000 Units  5,000 Units SubCUTAneous Q8H    methylPREDNISolone (PF) (SOLU-MEDROL) injection 60 mg  60 mg IntraVENous Q6H    guaiFENesin ER (MUCINEX) tablet 600 mg  600 mg Oral Q12H    sodium chloride (NS) flush 5-40 mL  5-40 mL IntraVENous Q8H    sodium chloride (NS) flush 5-40 mL  5-40 mL IntraVENous PRN    polyethylene glycol (MIRALAX) packet 17 g  17 g Oral DAILY PRN    albuterol (ACCUNEB) nebulizer solution 2.5 mg  2.5 mg Inhalation Q4H PRN    nitroglycerin (NITROSTAT) tablet 0.4 mg  0.4 mg SubLINGual Q5MIN PRN    cefepime (MAXIPIME) 2 g in sterile water (preservative free) 10 mL IV syringe  2 g IntraVENous Q8H    guaiFENesin-dextromethorphan (ROBITUSSIN DM) 100-10 mg/5 mL syrup 10 mL  10 mL Oral Q6H PRN    albuterol-ipratropium (DUO-NEB) 2.5 MG-0.5 MG/3 ML  3 mL Nebulization Q4H RT    cloNIDine HCL (CATAPRES) tablet 0.2 mg  0.2 mg Oral BID    azithromycin (ZITHROMAX) 500 mg in 0.9% sodium chloride 250 mL (VIAL-MATE)  500 mg IntraVENous Q24H     Current Outpatient Medications   Medication Sig    albuterol (PROVENTIL HFA, VENTOLIN HFA, PROAIR HFA) 90 mcg/actuation inhaler Take 2 Puffs by inhalation every four (4) hours as needed for Wheezing or Shortness of Breath. Indications: bronchospasm prevention    nitroglycerin (NITROSTAT) 0.4 mg SL tablet Take 1 Tab by mouth every five (5) minutes as needed for Chest Pain. Sit/Lay down then put one tab under the tongue every 5 minutes as needed for chest pain for 3 doses    aspirin delayed-release 81 mg tablet Take 1 Tab by mouth daily.  amLODIPine (NORVASC) 10 mg tablet Take 1 Tab by mouth daily for 30 days. Indications: high blood pressure    lisinopriL (PriniviL) 20 mg tablet Take 1 Tab by mouth daily for 30 days.  cloNIDine HCL (CATAPRES) 0.2 mg tablet Take 1 Tab by mouth three (3) times daily for 30 days.     fluticasone furoate-vilanteroL (Breo Ellipta) 200-25 mcg/dose inhaler Take 1 Puff by inhalation daily. Indications: controller medication for asthma    montelukast (SINGULAIR) 10 mg tablet Take 1 Tab by mouth nightly. Indications: controller medication for asthma    roflumilast (DALIRESP) 500 mcg tab tablet Take 1 Tab by mouth daily.  albuterol (ACCUNEB) 1.25 mg/3 mL nebu Take 3 mL by inhalation every four (4) hours as needed for Wheezing (wheezing). Indications: bronchospasm prevention    citalopram (CeleXA) 20 mg tablet Take 1 Tab by mouth every morning.  cloNIDine HCL (CATAPRES) 0.2 mg tablet Take 0.2 mg by mouth three (3) times daily.  famotidine (PEPCID) 20 mg tablet Take 1 Tab by mouth daily. (Patient taking differently: Take 20 mg by mouth daily as needed.)    OXYGEN-AIR DELIVERY SYSTEMS 3 L by IntraNASal route as needed for Other (sob).  atorvastatin (LIPITOR) 20 mg tablet Take 1 Tab by mouth nightly.  naloxone (NARCAN) 4 mg/actuation nasal spray Use 1 spray intranasally, then discard. Repeat with new spray every 2 min as needed for opioid overdose symptoms, alternating nostrils.        Assessment/Plan     Assessment: 63year old female presenting with dyspnea suspected COPD exacerbation with potential mild heart failure vs atypical infection contributing     Plan:    #Dyspnea  - likely multifactorial   - heart failure vs COPD exacerbation, see below   - ruling out atypical infection - rapid COVID negative  - sputum cx, legionella/strep urine Ag, biofire pending     #Acute on chronic diastolic heart failure   - EF 60-65%  - [facility] in 3/2020 showed nonobstructive CAD   - euvolemic appearing on exam   - BNP 1590, CXR with potential edema   - continue lasix 20mg daily per cards     #COPD exacerbation   - hx heavy tobacco use   - chest tightness, diffuse wheezing   - continue steroids, nebs q4, antitussive, singulair, and cefepime   - encourage IS, PEP device     #Leukocytosis   - likely reactive with steroids   - underlying pulm infection possible - on cefepime   - no other localizing symptoms   - procal 0.19   - continue to monitor     #Hyperkalemia   - very mild at 5.2   - continue to monitor    - repeat BMP     #Acute kidney injury  - pre-renal?  - Cr improving   - continue to monitor     #Prolonged QTc   - chronic   - improved on EKG today  - avoid QT prolonging meds if possible     #Polysubstance abuse   - hx heroin use   - in a methadone treatment program  - methadone currently held     #Tobacco use  -  to quit     #HTN  - mildly hypertensive  - continue norvasc, lisinopril, clonidine   - aware that steroids may worsen     #HLD  - continue lipitor     Case discussed with:  [x]Patient  []Family  []Nursing  []Case Management  DVT Prophylaxis:  []Lovenox  [x]Hep SQ  []SCDs  []Coumadin   []On Heparin gtt    Jennifer Alvarez II, MD  1/11/2021 1:35 PM

## 2021-01-11 NOTE — PROGRESS NOTES
Rhythm stable, QTc improved, no further cardiac workup planned. I will start maintenance lasix at 20 mg daily. Ongoing treatment for COPD. I saw, examined, and evaluated the patient. I personally reviewed the patient's labs, tests, vitals, orders, medications, updated history, and other providers assessments. I personally agree with the findings as stated and the plan as documented. Capo Bradshaw MD         [facility]  -  Progress Note      Patient: Yolande Colmenares MRN: [id number]  SSN: [id number]    YOB: 1965  Age: 63 y.o. Sex: female      Admit Date: 1/9/2021    Assessment:     --COPD exacerbation. Primary reason for admission. Patient was just in the ER 2 days ago and left AGAINST MEDICAL ADVICE and then returned because her shortness of breath is not significantly improved. --Recurrent chest pain. Noncardiac in nature likely musculoskeletal related to her underlying COPD. She underwent a cardiac catheterization in March 2020 which was completely normal showing no obstructive CAD and normal LV filling pressures. --Prolonged QTc interval.  This has been seen on prior EKGs. Is only mildly prolonged on today's EKG. It was longer on her previous EKGs. --History of noncompliance. Patient left from the emergency room AGAINST MEDICAL ADVICE 2 days ago. --History of heroin abuse and polysubstance abuse. Patient is now on a methadone program.  --Essential hypertension. Blood pressure has been labile in the past.    Plan:     Most of her complaints are appearing to stem from her long standing pulmonary disease. Would continue with aggressive pulmonary treatments   She is not volume overloaded on exam.   Her QT intervals are within normal limits and she has not been complaining of any symptoms   Would defer her lung treatments to pulmonary  No new CV recommendations at this time. Subjective:     Patient coughing thick mucous without other symptoms.     Objective: Patient Vitals for the past 8 hrs:   Pulse Resp BP SpO2   01/11/21 0832   (!) 150/97 100 %   01/11/21 0615 95 22 (!) 141/70    01/11/21 0530 90 22 (!) 147/66    01/11/21 0445 81 24 (!) 144/68    01/11/21 0400 89 18 (!) 132/59    01/11/21 0315 79 23 (!) 133/49    01/11/21 0230 77 24 (!) 110/52          No data found. No intake or output data in the 24 hours ending 01/11/21 0952    Physical Exam:  General:  alert, cooperative, no distress, appears stated age  Neck:  nontender, no JVD  Lungs:  Crackles at L anterior field with mild wheeze  Heart:  regular rate and rhythm, S1, S2 normal, no murmur, click, rub or gallop  Abdomen:  abdomen is soft without significant tenderness, masses, organomegaly or guarding  Extremities:  extremities normal, atraumatic, no cyanosis or edema    Data Review:     Labs: Results:       Chemistry Recent Labs     01/11/21  0604 01/10/21  0635 01/09/21  1649   * 115* 146*    139 141   K 5.2 4.5 3.8    106 106   CO2 29 27 28   BUN 25* 23* 18   CREA 1.12 1.27 1.35*   CA 9.1 8.7 9.0   MG  --  2.2  --    AGAP 6 6 7   BUCR 22* 18 13   AP 82 78  --    TP 6.7 6.7  --    ALB 3.1* 3.0*  --    GLOB 3.6 3.7  --    AGRAT 0.9 0.8  --       CBC w/Diff Recent Labs     01/11/21  0604 01/10/21  0635 01/09/21  1649   WBC 14.2* 14.8* 17.9*   RBC 4.23 3.81* 4.37   HGB 12.2 11.0* 13.0   HCT 38.4 34.1* 39.4    224 274   GRANS 90* 90* 88*   LYMPH 7* 7* 6*   EOS 0 0 0      Cardiac Enzymes No results found for: CPK, CK, CKMMB, CKMB, RCK3, CKMBT, CKNDX, CKND1, JOSE, TROPT, TROIQ, SURY, TROPT, TNIPOC, BNP, BNPP   Coagulation No results for input(s): PTP, INR, APTT, INREXT in the last 72 hours.     Lipid Panel No results found for: CHOL, CHOLPOCT, CHOLX, CHLST, CHOLV, [id number], HDL, HDLP, LDL, LDLC, DLDLP, [id number], VLDLC, VLDL, TGLX, TRIGL, TRIGP, TGLPOCT, CHHD, CHHDX   BNP Lab Results   Component Value Date/Time    BNP 19.9 12/07/2015 05:23 PM    BNP 3.4 11/01/2015 03:20 PM Liver Enzymes Recent Labs     01/11/21  0604   TP 6.7   ALB 3.1*   AP 82      Digoxin    Thyroid Studies Lab Results   Component Value Date/Time    TSH 0.18 (L) 09/27/2019 10:50 PM

## 2021-01-12 LAB
ALBUMIN SERPL-MCNC: 2.7 G/DL (ref 3.4–5)
ALBUMIN/GLOB SERPL: 0.9 {RATIO} (ref 0.8–1.7)
ALP SERPL-CCNC: 77 U/L (ref 45–117)
ALT SERPL-CCNC: 23 U/L (ref 13–56)
ANION GAP SERPL CALC-SCNC: 5 MMOL/L (ref 3–18)
AST SERPL-CCNC: 11 U/L (ref 10–38)
BASOPHILS # BLD: 0 K/UL (ref 0–0.1)
BASOPHILS NFR BLD: 0 % (ref 0–2)
BILIRUB SERPL-MCNC: 0.5 MG/DL (ref 0.2–1)
BUN SERPL-MCNC: 33 MG/DL (ref 7–18)
BUN/CREAT SERPL: 26 (ref 12–20)
CALCIUM SERPL-MCNC: 8.3 MG/DL (ref 8.5–10.1)
CHLORIDE SERPL-SCNC: 107 MMOL/L (ref 100–111)
CO2 SERPL-SCNC: 29 MMOL/L (ref 21–32)
CREAT SERPL-MCNC: 1.29 MG/DL (ref 0.6–1.3)
DIFFERENTIAL METHOD BLD: ABNORMAL
EOSINOPHIL # BLD: 0 K/UL (ref 0–0.4)
EOSINOPHIL NFR BLD: 0 % (ref 0–5)
ERYTHROCYTE [DISTWIDTH] IN BLOOD BY AUTOMATED COUNT: 13.4 % (ref 11.6–14.5)
GLOBULIN SER CALC-MCNC: 3 G/DL (ref 2–4)
GLUCOSE SERPL-MCNC: 115 MG/DL (ref 74–99)
HCT VFR BLD AUTO: 35.3 % (ref 35–45)
HGB BLD-MCNC: 11.1 G/DL (ref 12–16)
LYMPHOCYTES # BLD: 0.8 K/UL (ref 0.9–3.6)
LYMPHOCYTES NFR BLD: 6 % (ref 21–52)
MCH RBC QN AUTO: 28.9 PG (ref 24–34)
MCHC RBC AUTO-ENTMCNC: 31.4 G/DL (ref 31–37)
MCV RBC AUTO: 91.9 FL (ref 74–97)
MONOCYTES # BLD: 0.2 K/UL (ref 0.05–1.2)
MONOCYTES NFR BLD: 2 % (ref 3–10)
NEUTS SEG # BLD: 11.2 K/UL (ref 1.8–8)
NEUTS SEG NFR BLD: 92 % (ref 40–73)
PLATELET # BLD AUTO: 248 K/UL (ref 135–420)
PMV BLD AUTO: 11.4 FL (ref 9.2–11.8)
POTASSIUM SERPL-SCNC: 4.6 MMOL/L (ref 3.5–5.5)
PROT SERPL-MCNC: 5.7 G/DL (ref 6.4–8.2)
RBC # BLD AUTO: 3.84 M/UL (ref 4.2–5.3)
SODIUM SERPL-SCNC: 141 MMOL/L (ref 136–145)
WBC # BLD AUTO: 12.2 K/UL (ref 4.6–13.2)

## 2021-01-12 PROCEDURE — 94640 AIRWAY INHALATION TREATMENT: CPT

## 2021-01-12 PROCEDURE — 74011000250 HC RX REV CODE- 250: Performed by: PHYSICIAN ASSISTANT

## 2021-01-12 PROCEDURE — 74011250637 HC RX REV CODE- 250/637: Performed by: HOSPITALIST

## 2021-01-12 PROCEDURE — 2709999900 HC NON-CHARGEABLE SUPPLY

## 2021-01-12 PROCEDURE — 80053 COMPREHEN METABOLIC PANEL: CPT

## 2021-01-12 PROCEDURE — 74011250636 HC RX REV CODE- 250/636: Performed by: INTERNAL MEDICINE

## 2021-01-12 PROCEDURE — 74011000250 HC RX REV CODE- 250: Performed by: INTERNAL MEDICINE

## 2021-01-12 PROCEDURE — 85025 COMPLETE CBC W/AUTO DIFF WBC: CPT

## 2021-01-12 PROCEDURE — 74011250636 HC RX REV CODE- 250/636: Performed by: HOSPITALIST

## 2021-01-12 PROCEDURE — 65660000000 HC RM CCU STEPDOWN

## 2021-01-12 PROCEDURE — 74011000250 HC RX REV CODE- 250: Performed by: HOSPITALIST

## 2021-01-12 PROCEDURE — 74011250637 HC RX REV CODE- 250/637: Performed by: EMERGENCY MEDICINE

## 2021-01-12 PROCEDURE — 74011250637 HC RX REV CODE- 250/637: Performed by: INTERNAL MEDICINE

## 2021-01-12 PROCEDURE — 94660 CPAP INITIATION&MGMT: CPT

## 2021-01-12 PROCEDURE — 36415 COLL VENOUS BLD VENIPUNCTURE: CPT

## 2021-01-12 PROCEDURE — 99233 SBSQ HOSP IP/OBS HIGH 50: CPT | Performed by: INTERNAL MEDICINE

## 2021-01-12 PROCEDURE — 99232 SBSQ HOSP IP/OBS MODERATE 35: CPT | Performed by: INTERNAL MEDICINE

## 2021-01-12 RX ADMIN — ARFORMOTEROL TARTRATE 15 MCG: 15 SOLUTION RESPIRATORY (INHALATION) at 08:03

## 2021-01-12 RX ADMIN — AMLODIPINE BESYLATE 10 MG: 10 TABLET ORAL at 08:52

## 2021-01-12 RX ADMIN — ASPIRIN 81 MG: 81 TABLET, COATED ORAL at 08:51

## 2021-01-12 RX ADMIN — Medication 10 ML: at 22:36

## 2021-01-12 RX ADMIN — METHYLPREDNISOLONE SODIUM SUCCINATE 60 MG: 40 INJECTION, POWDER, FOR SOLUTION INTRAMUSCULAR; INTRAVENOUS at 05:33

## 2021-01-12 RX ADMIN — ATORVASTATIN CALCIUM 20 MG: 20 TABLET, FILM COATED ORAL at 22:36

## 2021-01-12 RX ADMIN — CLONIDINE HYDROCHLORIDE 0.2 MG: 0.1 TABLET ORAL at 17:16

## 2021-01-12 RX ADMIN — AZITHROMYCIN DIHYDRATE 500 MG: 500 INJECTION, POWDER, LYOPHILIZED, FOR SOLUTION INTRAVENOUS at 20:49

## 2021-01-12 RX ADMIN — GUAIFENESIN AND DEXTROMETHORPHAN 10 ML: 100; 10 SYRUP ORAL at 14:07

## 2021-01-12 RX ADMIN — GUAIFENESIN 600 MG: 600 TABLET, EXTENDED RELEASE ORAL at 20:40

## 2021-01-12 RX ADMIN — Medication 10 ML: at 13:35

## 2021-01-12 RX ADMIN — FUROSEMIDE 20 MG: 20 TABLET ORAL at 08:52

## 2021-01-12 RX ADMIN — IPRATROPIUM BROMIDE AND ALBUTEROL SULFATE 3 ML: .5; 3 SOLUTION RESPIRATORY (INHALATION) at 00:00

## 2021-01-12 RX ADMIN — IPRATROPIUM BROMIDE AND ALBUTEROL SULFATE 3 ML: .5; 3 SOLUTION RESPIRATORY (INHALATION) at 16:00

## 2021-01-12 RX ADMIN — FAMOTIDINE 20 MG: 20 TABLET, FILM COATED ORAL at 08:52

## 2021-01-12 RX ADMIN — DIPHENHYDRAMINE HYDROCHLORIDE 25 MG: 50 INJECTION, SOLUTION INTRAMUSCULAR; INTRAVENOUS at 17:17

## 2021-01-12 RX ADMIN — METHYLPREDNISOLONE SODIUM SUCCINATE 60 MG: 40 INJECTION, POWDER, FOR SOLUTION INTRAMUSCULAR; INTRAVENOUS at 13:36

## 2021-01-12 RX ADMIN — CEFEPIME HYDROCHLORIDE 2 G: 2 INJECTION, POWDER, FOR SOLUTION INTRAVENOUS at 20:39

## 2021-01-12 RX ADMIN — Medication 10 ML: at 05:45

## 2021-01-12 RX ADMIN — IPRATROPIUM BROMIDE AND ALBUTEROL SULFATE 3 ML: .5; 3 SOLUTION RESPIRATORY (INHALATION) at 14:16

## 2021-01-12 RX ADMIN — DIPHENHYDRAMINE HYDROCHLORIDE 25 MG: 50 INJECTION, SOLUTION INTRAMUSCULAR; INTRAVENOUS at 08:51

## 2021-01-12 RX ADMIN — DOCUSATE SODIUM 50 MG AND SENNOSIDES 8.6 MG 1 TABLET: 8.6; 5 TABLET, FILM COATED ORAL at 08:52

## 2021-01-12 RX ADMIN — METHYLPREDNISOLONE SODIUM SUCCINATE 60 MG: 40 INJECTION, POWDER, FOR SOLUTION INTRAMUSCULAR; INTRAVENOUS at 17:17

## 2021-01-12 RX ADMIN — MONTELUKAST 10 MG: 10 TABLET, FILM COATED ORAL at 22:36

## 2021-01-12 RX ADMIN — IPRATROPIUM BROMIDE AND ALBUTEROL SULFATE 3 ML: .5; 3 SOLUTION RESPIRATORY (INHALATION) at 08:03

## 2021-01-12 RX ADMIN — GUAIFENESIN 600 MG: 600 TABLET, EXTENDED RELEASE ORAL at 08:51

## 2021-01-12 RX ADMIN — CEFEPIME 2 G: 2 INJECTION, POWDER, FOR SOLUTION INTRAVENOUS at 08:53

## 2021-01-12 RX ADMIN — CLONIDINE HYDROCHLORIDE 0.2 MG: 0.1 TABLET ORAL at 08:52

## 2021-01-12 RX ADMIN — BUDESONIDE 500 MCG: 0.5 SUSPENSION RESPIRATORY (INHALATION) at 08:03

## 2021-01-12 NOTE — PROGRESS NOTES
Problem: Patient Education: Go to Patient Education Activity  Goal: Patient/Family Education  Outcome: Progressing Towards Goal     Problem: Patient Education: Go to Patient Education Activity  Goal: Patient/Family Education  Outcome: Progressing Towards Goal     Problem: Falls - Risk of  Goal: *Absence of Falls  Description: Document Janna Hanley Fall Risk and appropriate interventions in the flowsheet.   Outcome: Progressing Towards Goal  Note: Fall Risk Interventions:  Mobility Interventions: Bed/chair exit alarm, Patient to call before getting OOB                             Problem: Patient Education: Go to Patient Education Activity  Goal: Patient/Family Education  Outcome: Progressing Towards Goal     Problem: Chronic Obstructive Pulmonary Disease (COPD)  Goal: *Oxygen saturation during activity within specified parameters  Outcome: Progressing Towards Goal  Goal: *Able to remain out of bed as prescribed  Outcome: Progressing Towards Goal  Goal: *Absence of hypoxia  Outcome: Progressing Towards Goal  Goal: *Optimize nutritional status  Outcome: Progressing Towards Goal     Problem: Patient Education: Go to Patient Education Activity  Goal: Patient/Family Education  Outcome: Progressing Towards Goal

## 2021-01-12 NOTE — PROGRESS NOTES
Reason for Admission:   Acute exacerbation of chronic obstructive pulmonary disease (COPD) ([facility] [J44.1]  COPD exacerbation (HCC) [J44.1]               RUR Score:     96%             Resources/supports as identified by patient/family:  Family supports. Top Challenges facing patient (as identified by patient/family and CM):   Frequent re-admissions. Finances/Medication cost?     No     Transportation      Family vs. medicaid cab    Support system or lack thereof? Family support    Living arrangements? Lives alone     Self-care/ADLs/Cognition? Independent         Current Advanced Directive/Advance Care Plan:  no           Plan for utilizing home health:   Patient has no home health orders, in place, at this time. This writer will continue to monitor for potential home health needs and orders. Likelihood of additional readmission:  HIGH             Transition of Care Plan:   Patient plans to return home with help from her family and possible home health services. Her family will transport her home. If not available, she will need Medicaid transport. Initial assessment completed with patient. Cognitive status of patient: oriented to time, place, person and situation. Face sheet information confirmed:  yes. The patient designates her daughter Miranda Nguyen  to participate in her discharge plan and to receive any needed information. This patient lives in a house, alone. Her family are close by. Patient is able to navigate steps as needed. Prior to hospitalization, patient was considered to be independent with ADLs/IADLS : yes . Patient has a current ACP document on file: no. Patient has declined to complete an ACP, at this time. Patient's daughter Miranda Nguyen or medicaid cab will be available to transport patient home upon discharge. The patient already has home oxygen (3L) supplied by [employer], available in the home. Patient is not currently active with home health. She has picked [facility], if needed. Patient has not stayed in a skilled nursing facility or rehab. This patient is on dialysis :no     Currently, the discharge plan is for patient to return home with help from her family and possible home health services. Her family will transport her home, if they are available upon discharge. If not, she will need Medicaid cab ride home. Patient's daughter/next of kin is Joe Galvan, #[phone] and SA#[phone]). Patient's PCP is Dr. Cortez Lares. She is insured through [employer]. The patient states that she can obtain her medications from the pharmacy, and take her medications as directed. This writer will continue to monitor for discharge planning to ensure a safe discharge home from [place]. Care Management Interventions  PCP Verified by CM: Yes(Dr. Cortez Lares)  Palliative Care Criteria Met (RRAT>21 & CHF Dx)?: No  Mode of Transport at Discharge: Other (see comment)(Family vs. Medicaid transport)  Transition of Care Consult (CM Consult): Discharge Planning  Current Support Network: Lives Alone, Family Lives Nearby  Confirm Follow Up Transport: Other (see comment)(Family vs. Medicaid transport)  The Plan for Transition of Care is Related to the Following Treatment Goals : [facility], if needed. The Patient and/or Patient Representative was Provided with a Choice of Provider and Agrees with the Discharge Plan?: Yes  Freedom of Choice List was Provided with Basic Dialogue that Supports the Patient's Individualized Plan of Care/Goals, Treatment Preferences and Shares the Quality Data Associated with the Providers?: Yes  Discharge Location  Discharge Placement: Home with family assistance      Berenice Tierney MSW  Care Manager  Pager#: [phone]

## 2021-01-12 NOTE — PROGRESS NOTES
[facility]  Pulmonary, Critical Care, and Sleep Medicine    Pulmonary Medicine Progress Note    Name: Ganga Healy MRN: [id number]  : 1965 Hospital: [address] [place] [address] Dr  Date: 2021       Subjective:  Pt is resting quietly in bed. Still with severe expiratory wheezes    Patient Active Problem List   Diagnosis Code    Abnormal CT of the chest R93.89    Asthma J45.909    Respiratory failure ([address] J96.90    Acute exacerbation of COPD with asthma ([address] J44.1, J45.901    Essential hypertension I10    Morbid obesity due to excess calories ([address] E66.01    Hypertensive heart failure ([address] I11.0    Sleep apnea G47.30    COPD (chronic obstructive pulmonary disease) with chronic bronchitis ([facility]) J44.9    T wave inversion in EKG R94.31    Tobacco use Z72.0    S/P hernia repair Z98.890, Z87.19    CKD (chronic kidney disease) stage 3, GFR 30-59 ml/min N18.30    Cocaine abuse ([address] F14.10    Drug-seeking behavior Z76.5    Fibromyalgia M79.7    Depression F32.9    Fe deficiency anemia D50.9    Gastroesophageal reflux disease without esophagitis K21.9    HLD (hyperlipidemia) E78.5    Thyroid goiter E04.9    MDRO (multiple drug resistant organisms) resistance [id number]    On home O2 Z99.81    Polysubstance abuse ([facility]) U14.76    Uncomplicated severe persistent asthma J45.50    Vocal cord disease J38.3    COPD with acute exacerbation ([facility]) J44.1    Chronic respiratory failure with hypoxia ([facility]) J96.11    Acute bronchitis J20.9    Suspected COVID-19 virus infection Z20.822    Tachycardia R00.0    Normocytic anemia D64.9    Acute respiratory distress R06.03    COPD exacerbation ([facility]) J44.1    Acute exacerbation of chronic obstructive pulmonary disease (COPD) ([facility]) J44.1    UTI (urinary tract infection) N39.0    Atypical chest pain R07.89    DEMARCUS (acute kidney injury) ([facility]) N17.9    Asthma exacerbation J45. 0    COPD (chronic obstructive pulmonary disease) ([facility] J44.9    Acute bronchitis with chronic obstructive pulmonary disease (COPD) ([facility]) J44.0, J20.9    CHF (congestive heart failure) ([facility]) I50.9    Long QT interval R94.31       Assessment:  · COPD/Asthma Exacerbation - 2/2 Viral PNA. May also have bacterial component given elevated WBC with yellow productive sputum. On 4L home O2. Did follow with Enedelia Dupont however admits she hasn't seen a pulmonologist in a while. COVID-19 (-) ([facility])  · (+) Rhinovirus and Enterovirus - Respiratory Panel (+)(01/10/21)  · (+) Nicotine Smoker - 1pk /1-2 wks  · Substance Abuse: reports taking methadone, denies other drug use: UDS + Cocaine and methadone 12/16/20  · R/O MY - States she has a PAP machine at home but it is full of mold and so she doesn't use it anymore --> needs a new device  · Hx of Vocal Cord Dysfunction Syndrome - Noted on [facility] [place], Previously treated by Speech Therapy   · Hx of Non-compliance  · Hx of Thyroid Disorder  · Anxiety with Grief response: Reports death of brother and mother 4 months ago and 31 y/o daughter about one year ago     Impression/Plan:  · SpO2 goal >90%-94; titrate supp O2 PRN; currently resting on NC  · Optimize bronchial hygiene. Encourage IS; add PEP device --> discussed with RT and pt for compliance  · Con't duo-nebs q4  · BID Pulmicort and Brovana nebs  · Solu-Medrol 60mg q6. Will taper once out of acute phase   · Con't Singulair, Mucinex and Robitussin   · Con't Azithromycin for COPD exacerbation x5 day course. ABX otherwise per primary. .  · Agree scheduled diuretics - monitor I/O's. Avoid over diuresis  · Assess home Oxygen needs at discharge  · Encourage medication compliance  · OT, PT, OOB and ambulate  · Will Follow    FiO2 to keep SpO2 >=92%, HOB >=30 degree, aspiration precautions, aggressive pulmonary toileting, incentive spirometry. Other issues management by primary team and respective consultants. Events and notes from last 24 hours reviewed. Discussed with patient and family, answered all questions to their satisfaction. Care plan discussed with nursing.      Labs and images personally seen and available reports reviewed  All current medicines are reviewed       Medications- Current:  Current Facility-Administered Medications   Medication Dose Route Frequency    cefepime (MAXIPIME) 2 g in sterile water (preservative free) 10 mL IV syringe  2 g IntraVENous Q12H    diphenhydrAMINE (BENADRYL) injection 25 mg  25 mg IntraVENous Q6H PRN    furosemide (LASIX) tablet 20 mg  20 mg Oral DAILY    arformoteroL (BROVANA) neb solution 15 mcg  15 mcg Nebulization BID RT    budesonide (PULMICORT) 500 mcg/2 ml nebulizer suspension  1,000 mcg Nebulization BID RT    amLODIPine (NORVASC) tablet 10 mg  10 mg Oral DAILY    aspirin delayed-release tablet 81 mg  81 mg Oral DAILY    atorvastatin (LIPITOR) tablet 20 mg  20 mg Oral QHS    famotidine (PEPCID) tablet 20 mg  20 mg Oral DAILY    [Held by provider] lisinopriL (PRINIVIL, ZESTRIL) tablet 20 mg  20 mg Oral DAILY    montelukast (SINGULAIR) tablet 10 mg  10 mg Oral QHS    sodium chloride (NS) flush 5-40 mL  5-40 mL IntraVENous Q8H    sodium chloride (NS) flush 5-40 mL  5-40 mL IntraVENous PRN    acetaminophen (TYLENOL) tablet 650 mg  650 mg Oral Q6H PRN    Or    acetaminophen (TYLENOL) suppository 650 mg  650 mg Rectal Q6H PRN    polyethylene glycol (MIRALAX) packet 17 g  17 g Oral DAILY PRN    senna-docusate (PERICOLACE) 8.6-50 mg per tablet 1 Tab  1 Tab Oral BID    heparin (porcine) injection 5,000 Units  5,000 Units SubCUTAneous Q8H    methylPREDNISolone (PF) (SOLU-MEDROL) injection 60 mg  60 mg IntraVENous Q6H    guaiFENesin ER (MUCINEX) tablet 600 mg  600 mg Oral Q12H    sodium chloride (NS) flush 5-40 mL  5-40 mL IntraVENous Q8H    sodium chloride (NS) flush 5-40 mL  5-40 mL IntraVENous PRN    polyethylene glycol (MIRALAX) packet 17 g  17 g Oral DAILY PRN    albuterol (ACCUNEB) nebulizer solution 2.5 mg  2.5 mg Inhalation Q4H PRN    nitroglycerin (NITROSTAT) tablet 0.4 mg  0.4 mg SubLINGual Q5MIN PRN    guaiFENesin-dextromethorphan (ROBITUSSIN DM) 100-10 mg/5 mL syrup 10 mL  10 mL Oral Q6H PRN    albuterol-ipratropium (DUO-NEB) 2.5 MG-0.5 MG/3 ML  3 mL Nebulization Q4H RT    cloNIDine HCL (CATAPRES) tablet 0.2 mg  0.2 mg Oral BID    azithromycin (ZITHROMAX) 500 mg in 0.9% sodium chloride 250 mL (VIAL-MATE)  500 mg IntraVENous Q24H       Objective:  Vital Signs:    Visit Vitals  BP (!) 108/54 (BP 1 Location: Right arm, BP Patient Position: At rest)   Pulse 75   Temp 97.6 °F (36.4 °C)   Resp 18   LMP 2009   SpO2 100%   Breastfeeding No      O2 Device: Nasal cannula  O2 Flow Rate (L/min): 5 l/min  Temp (24hrs), Av °F (36.7 °C), Min:97 °F (36.1 °C), Max:99.1 °F (37.3 °C)      Intake/Output:   Last shift:      No intake/output data recorded. Last 3 shifts: 01/10 1901 -  0700  In: 240 [P.O.:240]  Out: -     Intake/Output Summary (Last 24 hours) at 2021 1422  Last data filed at 2021  Gross per 24 hour   Intake 240 ml   Output    Net 240 ml       Physical Exam:     General/Neurology: Alert, Awake  Head:   Normocephalic, without obvious abnormality  Eye:   PERRL, EOM intact  Oral:  Mucus membranes moist  Lung:   B/l air entry fair. Severe expiratory wheezing. Heart:   S1 S2 present  Abdomen:  Soft, non tender, BS+nt   Extremities:  No pedal edema.    Skin:   Dry, intact  Data:      Recent Results (from the past 24 hour(s))   CBC WITH AUTOMATED DIFF    Collection Time: 21  4:02 AM   Result Value Ref Range    WBC 12.2 4.6 - 13.2 K/uL    RBC 3.84 (L) 4.20 - 5.30 M/uL    HGB 11.1 (L) 12.0 - 16.0 g/dL    HCT 35.3 35.0 - 45.0 %    MCV 91.9 74.0 - 97.0 FL    MCH 28.9 24.0 - 34.0 PG    MCHC 31.4 31.0 - 37.0 g/dL    RDW 13.4 11.6 - 14.5 %    PLATELET 534 416 - 741 K/uL    MPV 11.4 9.2 - 11.8 FL    NEUTROPHILS 92 (H) 40 - 73 %    LYMPHOCYTES 6 (L) 21 - 52 %    MONOCYTES 2 (L) 3 - 10 %    EOSINOPHILS 0 0 - 5 %    BASOPHILS 0 0 - 2 %    ABS. NEUTROPHILS 11.2 (H) 1.8 - 8.0 K/UL    ABS. LYMPHOCYTES 0.8 (L) 0.9 - 3.6 K/UL    ABS. MONOCYTES 0.2 0.05 - 1.2 K/UL    ABS. EOSINOPHILS 0.0 0.0 - 0.4 K/UL    ABS. BASOPHILS 0.0 0.0 - 0.1 K/UL    DF AUTOMATED     METABOLIC PANEL, COMPREHENSIVE    Collection Time: 01/12/21  4:02 AM   Result Value Ref Range    Sodium 141 136 - 145 mmol/L    Potassium 4.6 3.5 - 5.5 mmol/L    Chloride 107 100 - 111 mmol/L    CO2 29 21 - 32 mmol/L    Anion gap 5 3.0 - 18 mmol/L    Glucose 115 (H) 74 - 99 mg/dL    BUN 33 (H) 7.0 - 18 MG/DL    Creatinine 1.29 0.6 - 1.3 MG/DL    BUN/Creatinine ratio 26 (H) 12 - 20      GFR est AA 52 (L) >60 ml/min/1.73m2    GFR est non-AA 43 (L) >60 ml/min/1.73m2    Calcium 8.3 (L) 8.5 - 10.1 MG/DL    Bilirubin, total 0.5 0.2 - 1.0 MG/DL    ALT (SGPT) 23 13 - 56 U/L    AST (SGOT) 11 10 - 38 U/L    Alk. phosphatase 77 45 - 117 U/L    Protein, total 5.7 (L) 6.4 - 8.2 g/dL    Albumin 2.7 (L) 3.4 - 5.0 g/dL    Globulin 3.0 2.0 - 4.0 g/dL    A-G Ratio 0.9 0.8 - 1.7           Chemistry   Recent Labs     01/12/21  0402 01/11/21  0604 01/10/21  0635   * 113* 115*    140 139   K 4.6 5.2 4.5    105 106   CO2 29 29 27   BUN 33* 25* 23*   CREA 1.29 1.12 1.27   CA 8.3* 9.1 8.7   MG  --   --  2.2   AGAP 5 6 6   BUCR 26* 22* 18   AP 77 82 78   TP 5.7* 6.7 6.7   ALB 2.7* 3.1* 3.0*   GLOB 3.0 3.6 3.7   AGRAT 0.9 0.9 0.8       CBC w/Diff   Recent Labs     01/12/21  0402 01/11/21  0604 01/10/21  0635   WBC 12.2 14.2* 14.8*   RBC 3.84* 4.23 3.81*   HGB 11.1* 12.2 11.0*   HCT 35.3 38.4 34.1*    249 224   GRANS 92* 90* 90*   LYMPH 6* 7* 7*   EOS 0 0 0       ABG No results for input(s): PHI, PHI, POC2, PCO2I, PO2, PO2I, HCO3, HCO3I, FIO2, FIO2I in the last 72 hours.     Micro    Recent Labs     01/11/21  0840   CULT PENDING     Recent Labs     01/11/21  0840   CULT PENDING       CT (Most Recent)   Results from [facility] Encounter encounter on 10/11/20   CTA CHEST W OR W WO CONT    Narrative CTA CHEST PULMONARY EMBOLISM PROTOCOL        INDICATION: Heart failure, chronic kidney disease, cocaine abuse, oxygen  dependence, hypertension, fibromyalgia, morbid obesity, STEMI with exertional  chest and flank pain, pleuritic. . Question pulmonary embolism. TECHNIQUE: Thin collimation axial images obtained through the level of the  pulmonary arteries with additional imaging through the chest following the  uneventful administration of 100 mL Isovue-370 nonionic intravenous contrast.   Images reconstructed into three dimensional coronal and sagittal projections for  complete evaluation of the tortuous and overlapping pulmonary vascular  structures and to reduce patient radiation dose. All CT scans at this facility are performed using dose optimization technique as  appropriate to a performed exam, to include automated exposure control,  adjustment of the mA and/or kV according to patient size (including appropriate  matching first site-specific examinations), or use of iterative reconstruction  technique. COMPARISON: 10/8/2020; 7/26/2020. FINDINGS:    No filling defects are appreciated within the main, left, right, lobar or  visualized segmental pulmonary arteries to suggest embolism. Main pulmonary  artery is 3.1 cm transverse diameter. Thyroid: Right lobe is 4 cm and the left is 3 cm. It is diffusely heterogeneous  and likely nodular. Pericardium/ Heart: Left atrium appears dilated. Aorta/ Vessels: No aneurysm or dissection. Lymph Nodes: Unremarkable. .    Lungs: Mild central bronchial wall thickening. Limited evaluation secondary to  respiratory motion. Mild regions of subsegmental atelectasis or scarring lung  bases. Pleura: No effusion. Upper Abdomen: Left lateral hepatic cysts measure up to 1.5 cm. Left adrenal  thickening.  Incompletely visualized at least 2.2 cm left upper pole renal cyst.    Bones/soft tissues: Mild anasarca. No acute bony findings. Impression IMPRESSION:  No evidence for pulmonary emboli. Mild bronchitis or central venous congestion. Main pulmonary artery is mildly enlarged as may be seen in pulmonary arterial  hypertension. Left atrium appears enlarged. Thyromegaly and multinodular goiter. Mild anasarca. XR (Most Recent). CXR reviewed by me and compared with previous CXR   Results from Hospital Encounter encounter on 01/09/21   XR CHEST PORT    Narrative Portable Frontal Chest.    CLINICAL HISTORY: Shortness of breath. TECHNIQUE: Single frontal view of the chest, obtained portably. COMPARISONS: 1/8/2020. FINDINGS:       Mild haziness in the left midlung field. Mild septal thickening right lung  base. . The cardiomediastinal silhouette is unremarkable. Pulmonary vascularity  is normal, however. There are no effusions. Impression IMPRESSION:    Likely mild edema. Pneumonitis less likely. Correlate clinically and consider  follow-up. See my orders for details     Total care time exclusive of procedures with complex decision making, coordination of care and counseling patient performed and > 50% time spent in face to face evaluation as mentioned above.     Alejandra Keating MD  Critical Care Medicine

## 2021-01-12 NOTE — ACP (ADVANCE CARE PLANNING)
Advance Care Planning     Advance Care Planning Clinical Specialist  Conversation Note      Date of ACP Conversation: 01/12/21    Conversation Conducted with:  Patient with Decision Making Capacity    ACP Clinical Specialist: Parvin Pratt Decision Maker: NO    Current Designated Health Care Decision Maker: N/A. Patient declined to complete an ACP, at this time. Her daughter Roberta Monsalve, HP#[phone] and GD#[phone]) is her next of kin. Care Preferences    Hospitalization: \"If your health worsens and it becomes clear that your chance of recovery is unlikely, what would your preference be regarding hospitalization? \"    Choice:  [x]  The patient wants hospitalization  []  The patient prefers comfort-focused treatment without hospitalization. [] Yes  [x] No   Educated Patient / Carole Isabel regarding differences between Advance Directives and portable DNR orders. Length of ACP Conversation in minutes:      Conversation Outcomes:  [x] ACP discussion completed, but patient declined to complete an ACP, at this time.  [] Existing advance directive reviewed with patient; no changes to patient's previously recorded wishes   [] New Advance Directive completed   [] Portable Do Not Resuscitate prepared for Provider review and signature  [] POLST/POST/MOLST/MOST prepared for Provider review and signature      Follow-up plan:    [] Schedule follow-up conversation to continue planning  [] Referred individual to Provider for additional questions/concerns   [] Advised patient/agent/surrogate to review completed ACP document and update if needed with changes in condition, patient preferences or care setting     [x] This note routed to one or more involved healthcare providers      Berenice Mujica MS  Care Manager  Pager#: [phone]

## 2021-01-12 NOTE — PROGRESS NOTES
01/12/21 0020   Oxygen Therapy   O2 Sat (%) 100 %   Pulse via Oximetry 88 beats per minute   O2 Device BIPAP   FIO2 (%) 40 %   CPAP/BIPAP   CPAP/BIPAP Start/Stop On   Device Mode BIPAP   $$ Bipap Daily Yes   Mask Type and Size Full face; Medium   Skin Condition good   PIP Observed 15 cm H20   IPAP (cm H2O) 15 cm H2O   EPAP (cm H2O) 8 cm H2O   Inspiratory Time (sec) 0.9 seconds   Vt Spont (ml) 558 ml   Ve Observed (l/min) 11 l/min   Backup Rate 12   Total RR (Spontaneous) 19 breaths per minute   Insp Rise Time (sec) 3   Leak (Estimated) 23 L/min   Pt's Home Machine No   Biomedical Check Performed Yes   Settings Verified Yes   Alarm Settings   High Pressure 40   Apnea 20   Low Ve 3   High Rate 45   Low Rate 10     Pt placed on bipap for the night. No respiratory distress noted, patient is comfortable. Pt instructed to call if help is needed, call bell within reach.   Patient confirms understanding

## 2021-01-12 NOTE — PROGRESS NOTES
Progress Note    Patient: Zach Rowan MRN: [id number]  CSN: [id number]    YOB: 1965  Age: 63 y.o. Sex: female    DOA: 1/9/2021 LOS:  LOS: 3 days                    Subjective:     Chief Complaint:   Chief Complaint   Patient presents with    Respiratory Distress     Continues to report dyspnea, productive cough. Symptoms essentially the same as yesterday    Review of systems  General: No fevers or chills. Cardiovascular: No chest pain or pressure. No palpitations. Pulmonary: shortness of breath, productive cough, and wheeze. Gastrointestinal: No abdominal pain, nausea, vomiting or diarrhea. Genitourinary: No urinary frequency, urgency, hesitancy or dysuria. Musculoskeletal: No joint or muscle pain, no back pain, no recent trauma. Neurologic: No headache, numbness, tingling or weakness. Objective:     Physical Exam:  Visit Vitals  /63 (BP 1 Location: Right arm, BP Patient Position: At rest)   Pulse 71   Temp 97 °F (36.1 °C)   Resp 17   SpO2 99%   Breastfeeding No        General:         Alert, cooperative, no acute distress. Mild conversational dyspnea. HEENT: NC, Atraumatic. PERRLA, anicteric sclerae. Lungs: Diffuse wheezing throughout   Heart:  Regular  rhythm,  No murmur, No Rubs, No Gallops  Abdomen: Soft, Non distended, Non tender.  +Bowel sounds, no HSM  Extremities: No c/c/e  Psych:   Good insight. Not anxious or agitated. Neurologic:  CN 2-12 grossly intact, Alert and oriented X 3. Intake and Output:  Current Shift:  No intake/output data recorded.   Last three shifts:  01/10 1901 - 01/12 0700  In: 240 [P.O.:240]  Out: -     Labs: Results:       Chemistry Recent Labs     01/12/21  0402 01/11/21  0604 01/10/21  0635   * 113* 115*    140 139   K 4.6 5.2 4.5    105 106   CO2 29 29 27   BUN 33* 25* 23*   CREA 1.29 1.12 1.27   CA 8.3* 9.1 8.7   AGAP 5 6 6   BUCR 26* 22* 18   AP 77 82 78   TP 5.7* 6.7 6.7   ALB 2.7* 3.1* 3.0*   GLOB 3.0 3.6 3. 7   AGRAT 0.9 0.9 0.8      CBC w/Diff Recent Labs     01/12/21  0402 01/11/21  0604 01/10/21  0635   WBC 12.2 14.2* 14.8*   RBC 3.84* 4.23 3.81*   HGB 11.1* 12.2 11.0*   HCT 35.3 38.4 34.1*    249 224   GRANS 92* 90* 90*   LYMPH 6* 7* 7*   EOS 0 0 0      Cardiac Enzymes Recent Labs     01/09/21  1649   CPK 65   CKND1 3.8      Coagulation No results for input(s): PTP, INR, APTT, INREXT in the last 72 hours. Lipid Panel No results found for: CHOL, CHOLPOCT, CHOLX, CHLST, CHOLV, 161969, HDL, HDLP, LDL, LDLC, DLDLP, 762101, VLDLC, VLDL, TGLX, TRIGL, TRIGP, TGLPOCT, CHHD, CHHDX   BNP No results for input(s): BNPP in the last 72 hours.    Liver Enzymes Recent Labs     01/12/21  0402   TP 5.7*   ALB 2.7*   AP 77      Thyroid Studies Lab Results   Component Value Date/Time    TSH 0.18 (L) 09/27/2019 10:50 PM          Procedures/imaging: see electronic medical records for all procedures/Xrays and details which were not copied into this note but were reviewed prior to creation of Plan    Medications:   Current Facility-Administered Medications   Medication Dose Route Frequency    cefepime (MAXIPIME) 2 g in sterile water (preservative free) 10 mL IV syringe  2 g IntraVENous Q12H    diphenhydrAMINE (BENADRYL) injection 25 mg  25 mg IntraVENous Q6H PRN    furosemide (LASIX) tablet 20 mg  20 mg Oral DAILY    arformoteroL (BROVANA) neb solution 15 mcg  15 mcg Nebulization BID RT    budesonide (PULMICORT) 500 mcg/2 ml nebulizer suspension  1,000 mcg Nebulization BID RT    amLODIPine (NORVASC) tablet 10 mg  10 mg Oral DAILY    aspirin delayed-release tablet 81 mg  81 mg Oral DAILY    atorvastatin (LIPITOR) tablet 20 mg  20 mg Oral QHS    famotidine (PEPCID) tablet 20 mg  20 mg Oral DAILY    [Held by provider] lisinopriL (PRINIVIL, ZESTRIL) tablet 20 mg  20 mg Oral DAILY    montelukast (SINGULAIR) tablet 10 mg  10 mg Oral QHS    sodium chloride (NS) flush 5-40 mL  5-40 mL IntraVENous Q8H    sodium chloride (NS) flush 5-40 mL  5-40 mL IntraVENous PRN    acetaminophen (TYLENOL) tablet 650 mg  650 mg Oral Q6H PRN    Or    acetaminophen (TYLENOL) suppository 650 mg  650 mg Rectal Q6H PRN    polyethylene glycol (MIRALAX) packet 17 g  17 g Oral DAILY PRN    senna-docusate (PERICOLACE) 8.6-50 mg per tablet 1 Tab  1 Tab Oral BID    heparin (porcine) injection 5,000 Units  5,000 Units SubCUTAneous Q8H    methylPREDNISolone (PF) (SOLU-MEDROL) injection 60 mg  60 mg IntraVENous Q6H    guaiFENesin ER (MUCINEX) tablet 600 mg  600 mg Oral Q12H    sodium chloride (NS) flush 5-40 mL  5-40 mL IntraVENous Q8H    sodium chloride (NS) flush 5-40 mL  5-40 mL IntraVENous PRN    polyethylene glycol (MIRALAX) packet 17 g  17 g Oral DAILY PRN    albuterol (ACCUNEB) nebulizer solution 2.5 mg  2.5 mg Inhalation Q4H PRN    nitroglycerin (NITROSTAT) tablet 0.4 mg  0.4 mg SubLINGual Q5MIN PRN    guaiFENesin-dextromethorphan (ROBITUSSIN DM) 100-10 mg/5 mL syrup 10 mL  10 mL Oral Q6H PRN    albuterol-ipratropium (DUO-NEB) 2.5 MG-0.5 MG/3 ML  3 mL Nebulization Q4H RT    cloNIDine HCL (CATAPRES) tablet 0.2 mg  0.2 mg Oral BID    azithromycin (ZITHROMAX) 500 mg in 0.9% sodium chloride 250 mL (VIAL-MATE)  500 mg IntraVENous Q24H       Assessment/Plan    Assessment: 63year old female presenting with dyspnea suspected COPD/asthma exacerbation triggered by viral infection and mild heart failure possibly contributing    Plan:     #Dyspnea  - likely multifactorial   - heart failure vs COPD exacerbation vs pneumonia   - rapid COVID negative  - Viral panel positive for rhino/enterovirus  - sputum cx, legionella/strep urine Ag      #Acute on chronic diastolic heart failure   - EF 60-65%  - [facility] in 3/2020 showed nonobstructive CAD   - euvolemic appearing on exam   - BNP 1590, CXR with potential edema   - continue lasix 20mg daily      #COPD/asthma exacerbation   - hx heavy tobacco use   - chest tightness, diffuse wheezing   - trigger for exacerbation likely rhino/enterovirus  - continue steroids, nebs q4, pulmicort and brovana nebs twice daily, antitussive, singulair   - continue cefepime and azithromycin for 5 days  - encourage IS, PEP device   - appreciate pulmonary recommendations    #History of vocal cord dysfunction  - Likely not because of acute dyspnea, but can contribute to dyspnea     #Leukocytosis   - likely reactive with steroids   - improved today  - procal 0.19   - on antibiotics for COPD exacerbation/possible pneumonia  - continue to monitor      #Hyperkalemia   - Resolved     #Acute kidney injury  - pre-renal?  - slight increase in creatinine today  - continue to monitor      #Prolonged QTc   - chronic   - improved on EKG today  - avoid QT prolonging meds if possible      #Polysubstance abuse   - hx heroin use   - in a methadone treatment program  - methadone currently held      #Tobacco use  -  to quit      #HTN  - Normotensive  - continue norvasc, lisinopril, clonidine   - aware that steroids may worsen      #HLD  - continue lipitor     Case discussed with:  [x]Patient  []Family  []Nursing  []Case Management  DVT Prophylaxis:  []Lovenox  [x]Hep SQ  []SCDs  []Coumadin   []On Heparin gtt    Sanaz Smith II, MD  1/12/2021 11:45 AM

## 2021-01-12 NOTE — ROUTINE PROCESS
Bedside and Verbal shift change report given to Becky Del Toro RN (oncoming nurse) by Barney Chaidez RN 
 (offgoing nurse). Report included the following information SBAR, Kardex, Intake/Output, MAR and Recent Results.

## 2021-01-12 NOTE — PROGRESS NOTES
Bedside shift change report given to Nova Pisano RN (oncoming nurse) by Clark Mckeon RN (offgoing nurse). Report included the following information SBAR, Kardex and MAR.

## 2021-01-12 NOTE — PROGRESS NOTES
Problem: Patient Education: Go to Patient Education Activity  Goal: Patient/Family Education  Outcome: Progressing Towards Goal     Problem: Patient Education: Go to Patient Education Activity  Goal: Patient/Family Education  Outcome: Progressing Towards Goal     Problem: Falls - Risk of  Goal: *Absence of Falls  Description: Document Sophia Morin Fall Risk and appropriate interventions in the flowsheet.   Outcome: Progressing Towards Goal  Note: Fall Risk Interventions:  Mobility Interventions: Bed/chair exit alarm, Patient to call before getting OOB         Medication Interventions: Teach patient to arise slowly                   Problem: Patient Education: Go to Patient Education Activity  Goal: Patient/Family Education  Outcome: Progressing Towards Goal

## 2021-01-13 LAB
AMPHET UR QL SCN: NEGATIVE
BACTERIA SPEC CULT: NORMAL
BARBITURATES UR QL SCN: NEGATIVE
BENZODIAZ UR QL: NEGATIVE
CANNABINOIDS UR QL SCN: NEGATIVE
COCAINE UR QL SCN: NEGATIVE
GRAM STN SPEC: NORMAL
HDSCOM,HDSCOM: ABNORMAL
METHADONE UR QL: NEGATIVE
OPIATES UR QL: POSITIVE
PCP UR QL: NEGATIVE
SERVICE CMNT-IMP: NORMAL

## 2021-01-13 PROCEDURE — 74011250637 HC RX REV CODE- 250/637: Performed by: HOSPITALIST

## 2021-01-13 PROCEDURE — 74011250637 HC RX REV CODE- 250/637: Performed by: INTERNAL MEDICINE

## 2021-01-13 PROCEDURE — 74011250636 HC RX REV CODE- 250/636: Performed by: INTERNAL MEDICINE

## 2021-01-13 PROCEDURE — 94761 N-INVAS EAR/PLS OXIMETRY MLT: CPT

## 2021-01-13 PROCEDURE — 74011250636 HC RX REV CODE- 250/636: Performed by: HOSPITALIST

## 2021-01-13 PROCEDURE — 65660000000 HC RM CCU STEPDOWN

## 2021-01-13 PROCEDURE — 94640 AIRWAY INHALATION TREATMENT: CPT

## 2021-01-13 PROCEDURE — 80307 DRUG TEST PRSMV CHEM ANLYZR: CPT

## 2021-01-13 PROCEDURE — 74011000250 HC RX REV CODE- 250: Performed by: INTERNAL MEDICINE

## 2021-01-13 PROCEDURE — 77010033678 HC OXYGEN DAILY

## 2021-01-13 PROCEDURE — 74011000250 HC RX REV CODE- 250: Performed by: PHYSICIAN ASSISTANT

## 2021-01-13 PROCEDURE — 2709999900 HC NON-CHARGEABLE SUPPLY

## 2021-01-13 PROCEDURE — 97535 SELF CARE MNGMENT TRAINING: CPT

## 2021-01-13 RX ORDER — ACETYLCYSTEINE 100 MG/ML
2 SOLUTION ORAL; RESPIRATORY (INHALATION)
Status: DISPENSED | OUTPATIENT
Start: 2021-01-13 | End: 2021-01-14

## 2021-01-13 RX ORDER — ACETYLCYSTEINE 100 MG/ML
2 SOLUTION ORAL; RESPIRATORY (INHALATION)
Status: DISCONTINUED | OUTPATIENT
Start: 2021-01-13 | End: 2021-01-13

## 2021-01-13 RX ORDER — CODEINE PHOSPHATE AND GUAIFENESIN 10; 100 MG/5ML; MG/5ML
10 SOLUTION ORAL
Status: DISCONTINUED | OUTPATIENT
Start: 2021-01-13 | End: 2021-01-13

## 2021-01-13 RX ORDER — ALPRAZOLAM 0.5 MG/1
0.25 TABLET ORAL
Status: DISCONTINUED | OUTPATIENT
Start: 2021-01-13 | End: 2021-01-14

## 2021-01-13 RX ORDER — CODEINE PHOSPHATE AND GUAIFENESIN 10; 100 MG/5ML; MG/5ML
5 SOLUTION ORAL
Status: DISCONTINUED | OUTPATIENT
Start: 2021-01-13 | End: 2021-01-13

## 2021-01-13 RX ADMIN — ATORVASTATIN CALCIUM 20 MG: 20 TABLET, FILM COATED ORAL at 22:35

## 2021-01-13 RX ADMIN — Medication 10 ML: at 15:46

## 2021-01-13 RX ADMIN — DIPHENHYDRAMINE HYDROCHLORIDE 25 MG: 50 INJECTION, SOLUTION INTRAMUSCULAR; INTRAVENOUS at 11:51

## 2021-01-13 RX ADMIN — AMLODIPINE BESYLATE 10 MG: 10 TABLET ORAL at 09:14

## 2021-01-13 RX ADMIN — ASPIRIN 81 MG: 81 TABLET, COATED ORAL at 09:14

## 2021-01-13 RX ADMIN — GUAIFENESIN 600 MG: 600 TABLET, EXTENDED RELEASE ORAL at 20:32

## 2021-01-13 RX ADMIN — Medication 10 ML: at 22:35

## 2021-01-13 RX ADMIN — MONTELUKAST 10 MG: 10 TABLET, FILM COATED ORAL at 22:35

## 2021-01-13 RX ADMIN — AZITHROMYCIN DIHYDRATE 500 MG: 500 INJECTION, POWDER, LYOPHILIZED, FOR SOLUTION INTRAVENOUS at 20:43

## 2021-01-13 RX ADMIN — DIPHENHYDRAMINE HYDROCHLORIDE 25 MG: 50 INJECTION, SOLUTION INTRAMUSCULAR; INTRAVENOUS at 06:41

## 2021-01-13 RX ADMIN — GUAIFENESIN 600 MG: 600 TABLET, EXTENDED RELEASE ORAL at 09:14

## 2021-01-13 RX ADMIN — CLONIDINE HYDROCHLORIDE 0.2 MG: 0.1 TABLET ORAL at 17:50

## 2021-01-13 RX ADMIN — BUDESONIDE 1000 MCG: 0.5 SUSPENSION RESPIRATORY (INHALATION) at 08:07

## 2021-01-13 RX ADMIN — CEFEPIME HYDROCHLORIDE 2 G: 2 INJECTION, POWDER, FOR SOLUTION INTRAVENOUS at 20:37

## 2021-01-13 RX ADMIN — CEFEPIME HYDROCHLORIDE 2 G: 2 INJECTION, POWDER, FOR SOLUTION INTRAVENOUS at 09:15

## 2021-01-13 RX ADMIN — ARFORMOTEROL TARTRATE 15 MCG: 15 SOLUTION RESPIRATORY (INHALATION) at 08:08

## 2021-01-13 RX ADMIN — METHYLPREDNISOLONE SODIUM SUCCINATE 60 MG: 40 INJECTION, POWDER, FOR SOLUTION INTRAMUSCULAR; INTRAVENOUS at 11:36

## 2021-01-13 RX ADMIN — Medication 10 ML: at 05:59

## 2021-01-13 RX ADMIN — GUAIFENESIN AND CODEINE PHOSPHATE 10 ML: 100; 10 SOLUTION ORAL at 11:36

## 2021-01-13 RX ADMIN — IPRATROPIUM BROMIDE AND ALBUTEROL SULFATE 3 ML: .5; 3 SOLUTION RESPIRATORY (INHALATION) at 05:40

## 2021-01-13 RX ADMIN — CLONIDINE HYDROCHLORIDE 0.2 MG: 0.1 TABLET ORAL at 09:14

## 2021-01-13 RX ADMIN — FAMOTIDINE 20 MG: 20 TABLET, FILM COATED ORAL at 09:14

## 2021-01-13 RX ADMIN — GUAIFENESIN AND CODEINE PHOSPHATE 5 ML: 100; 10 SOLUTION ORAL at 18:34

## 2021-01-13 RX ADMIN — IPRATROPIUM BROMIDE AND ALBUTEROL SULFATE 3 ML: .5; 3 SOLUTION RESPIRATORY (INHALATION) at 08:08

## 2021-01-13 RX ADMIN — METHYLPREDNISOLONE SODIUM SUCCINATE 60 MG: 40 INJECTION, POWDER, FOR SOLUTION INTRAMUSCULAR; INTRAVENOUS at 00:51

## 2021-01-13 RX ADMIN — METHYLPREDNISOLONE SODIUM SUCCINATE 60 MG: 40 INJECTION, POWDER, FOR SOLUTION INTRAMUSCULAR; INTRAVENOUS at 05:58

## 2021-01-13 RX ADMIN — METHYLPREDNISOLONE SODIUM SUCCINATE 60 MG: 40 INJECTION, POWDER, FOR SOLUTION INTRAMUSCULAR; INTRAVENOUS at 17:51

## 2021-01-13 RX ADMIN — FUROSEMIDE 20 MG: 20 TABLET ORAL at 09:14

## 2021-01-13 RX ADMIN — ALPRAZOLAM 0.25 MG: 0.5 TABLET ORAL at 20:32

## 2021-01-13 NOTE — PROGRESS NOTES
Problem: Patient Education: Go to Patient Education Activity  Goal: Patient/Family Education  Outcome: Progressing Towards Goal     Problem: Patient Education: Go to Patient Education Activity  Goal: Patient/Family Education  Outcome: Progressing Towards Goal     Problem: Falls - Risk of  Goal: *Absence of Falls  Description: Document Sophia Morin Fall Risk and appropriate interventions in the flowsheet.   Outcome: Progressing Towards Goal  Note: Fall Risk Interventions:  Mobility Interventions: Patient to call before getting OOB         Medication Interventions: Teach patient to arise slowly         History of Falls Interventions: Bed/chair exit alarm

## 2021-01-13 NOTE — PROGRESS NOTES
Met briefly with patient. She mentioned she has has numerous hospitalizations with COPD. After a brief encounter her nurse and doctor came in. She was eager to get an update on her condition. LAM Black

## 2021-01-13 NOTE — PROGRESS NOTES
Attempted to give pt a treatment and apply NIPPV, both refused. Pt advised to hit call bell if she changes her mind tonight. No respiratory distress noted, patient is comfortable. Pt instructed to call if help is needed, call bell within reach.   Patient confirms understanding

## 2021-01-13 NOTE — PROGRESS NOTES
No respiratory distress noted, patient is comfortable. Pt instructed to call if help is needed, call bell within reach.   Patient confirms understanding     01/13/21 0555   Oxygen Therapy   O2 Sat (%) 96 %   Pulse via Oximetry 90 beats per minute   O2 Device Nasal cannula   O2 Flow Rate (L/min) 5 l/min   Pre-Treatment   Breathing Pattern Regular   Breath Sounds Bilateral Scattered wheezing   Post-Treatment   Breathing Pattern Regular   Breath Sounds Bilateral Scattered wheezing   Pulse 90   SpO2 96 %   Respirations 17   Treatment Tolerance Well   Procedures   $$ Subsequent Procedure Aerosol   Delivery Source Mask;Breath Actuated Nebulizer

## 2021-01-13 NOTE — PROGRESS NOTES
Progress Note    Patient: Sandee Looney MRN: [id number]  CSN: [id number]    YOB: 1965  Age: 63 y.o. Sex: female    DOA: 1/9/2021 LOS:  LOS: 4 days                    Subjective:     Chief Complaint:   Chief Complaint   Patient presents with    Respiratory Distress     Continues to complain of dyspnea. Attempted to ambulate and had significant dyspnea. Complains of productive cough and wheezing. Patient has been refusing some treatments during her course. Discussed importance of compliance to treatment regimen with patient. She endorsed understanding and agreed. Review of systems  General: No fevers or chills. Cardiovascular: No chest pain or pressure. No palpitations. Pulmonary: shortness of breath, productive cough and wheeze. Gastrointestinal: No abdominal pain, nausea, vomiting or diarrhea. Genitourinary: No urinary frequency, urgency, hesitancy or dysuria. Musculoskeletal: No joint or muscle pain, no back pain, no recent trauma. Neurologic: No headache, numbness, tingling or weakness. Objective:     Physical Exam:    Visit Vitals  /77 (BP 1 Location: Right arm, BP Patient Position: At rest)   Pulse 77   Temp 98.3 °F (36.8 °C)   Resp 18   SpO2 96%   Breastfeeding No        General:         Alert, cooperative, no acute distress    HEENT: NC, Atraumatic. PERRLA, anicteric sclerae. Lungs: Diffuse expiratory wheezes  Heart:  Regular  rhythm,  No murmur, No Rubs, No Gallops  Abdomen: Soft, Non distended, Non tender.  +Bowel sounds, no HSM  Extremities: No c/c/e  Psych:   Good insight. Not anxious or agitated. Neurologic:  CN 2-12 grossly intact, Alert and oriented X 3.      Intake and Output:  Current Shift:  01/13 0701 - 01/13 1900  In: 120 [P.O.:120]  Out: -   Last three shifts:  01/11 1901 - 01/13 0700  In: 240 [P.O.:240]  Out: -     Labs: Results:       Chemistry Recent Labs     01/12/21  0402 01/11/21  0604   * 113*    140   K 4.6 5.2    105   CO2 29 29   BUN 33* 25*   CREA 1.29 1.12   CA 8.3* 9.1   AGAP 5 6   BUCR 26* 22*   AP 77 82   TP 5.7* 6.7   ALB 2.7* 3.1*   GLOB 3.0 3.6   AGRAT 0.9 0.9      CBC w/Diff Recent Labs     01/12/21  0402 01/11/21  0604   WBC 12.2 14.2*   RBC 3.84* 4.23   HGB 11.1* 12.2   HCT 35.3 38.4    249   GRANS 92* 90*   LYMPH 6* 7*   EOS 0 0      Cardiac Enzymes No results for input(s): CPK, CKND1, JOSE in the last 72 hours. No lab exists for component: CKRMB, TROIP   Coagulation No results for input(s): PTP, INR, APTT, INREXT in the last 72 hours. Lipid Panel No results found for: CHOL, CHOLPOCT, CHOLX, CHLST, CHOLV, 803329, HDL, HDLP, LDL, LDLC, DLDLP, 920565, VLDLC, VLDL, TGLX, TRIGL, TRIGP, TGLPOCT, CHHD, CHHDX   BNP No results for input(s): BNPP in the last 72 hours.    Liver Enzymes Recent Labs     01/12/21  0402   TP 5.7*   ALB 2.7*   AP 77      Thyroid Studies Lab Results   Component Value Date/Time    TSH 0.18 (L) 09/27/2019 10:50 PM          Procedures/imaging: see electronic medical records for all procedures/Xrays and details which were not copied into this note but were reviewed prior to creation of Plan    Medications:   Current Facility-Administered Medications   Medication Dose Route Frequency    guaiFENesin-codeine (ROBITUSSIN AC) 100-10 mg/5 mL solution 5 mL  5 mL Oral Q6H PRN    acetylcysteine (MUCOMYST) 100 mg/mL (10 %) nebulizer solution 200 mg  2 mL Nebulization Q6H RT    cefepime (MAXIPIME) 2 g in sterile water (preservative free) 10 mL IV syringe  2 g IntraVENous Q12H    arformoteroL (BROVANA) neb solution 15 mcg  15 mcg Nebulization BID RT    budesonide (PULMICORT) 500 mcg/2 ml nebulizer suspension  1,000 mcg Nebulization BID RT    amLODIPine (NORVASC) tablet 10 mg  10 mg Oral DAILY    aspirin delayed-release tablet 81 mg  81 mg Oral DAILY    atorvastatin (LIPITOR) tablet 20 mg  20 mg Oral QHS    famotidine (PEPCID) tablet 20 mg  20 mg Oral DAILY    [Held by provider] lisinopriL (PRINIVIL, ZESTRIL) tablet 20 mg  20 mg Oral DAILY    montelukast (SINGULAIR) tablet 10 mg  10 mg Oral QHS    sodium chloride (NS) flush 5-40 mL  5-40 mL IntraVENous Q8H    sodium chloride (NS) flush 5-40 mL  5-40 mL IntraVENous PRN    acetaminophen (TYLENOL) tablet 650 mg  650 mg Oral Q6H PRN    Or    acetaminophen (TYLENOL) suppository 650 mg  650 mg Rectal Q6H PRN    polyethylene glycol (MIRALAX) packet 17 g  17 g Oral DAILY PRN    senna-docusate (PERICOLACE) 8.6-50 mg per tablet 1 Tab  1 Tab Oral BID    heparin (porcine) injection 5,000 Units  5,000 Units SubCUTAneous Q8H    methylPREDNISolone (PF) (SOLU-MEDROL) injection 60 mg  60 mg IntraVENous Q6H    guaiFENesin ER (MUCINEX) tablet 600 mg  600 mg Oral Q12H    sodium chloride (NS) flush 5-40 mL  5-40 mL IntraVENous Q8H    sodium chloride (NS) flush 5-40 mL  5-40 mL IntraVENous PRN    albuterol (ACCUNEB) nebulizer solution 2.5 mg  2.5 mg Inhalation Q4H PRN    nitroglycerin (NITROSTAT) tablet 0.4 mg  0.4 mg SubLINGual Q5MIN PRN    albuterol-ipratropium (DUO-NEB) 2.5 MG-0.5 MG/3 ML  3 mL Nebulization Q4H RT    cloNIDine HCL (CATAPRES) tablet 0.2 mg  0.2 mg Oral BID    azithromycin (ZITHROMAX) 500 mg in 0.9% sodium chloride 250 mL (VIAL-MATE)  500 mg IntraVENous Q24H       Assessment/Plan     Assessment: 63year old female presenting with dyspnea, productive cough, and wheezing secondary to COPD/Asthma exacerbation      Plan:     #COPD/asthma exacerbation   - hx heavy tobacco use   - chest tightness, diffuse wheezing, productive cough  - trigger for exacerbation likely rhino/enterovirus  - may have bacterial component with cough productive of yellow sputum  - continue steroids, nebs q4, pulmicort and brovana nebs twice daily, antitussive, singulair   - continue cefepime and azithromycin for 5 days  - encourage IS, PEP device   - Titrate oxygen to maintain sat above 92%  - Reassess home oxygen need at discharge  - PT/OT, out of bed and ambulate  - Follow-up strep/Legionella urine antigen and respiratory cultures  - appreciate pulmonary recommendations     #Acute on chronic diastolic heart failure?    - EF 60-65%  - [facility] in 3/2020 showed nonobstructive CAD   - euvolemic appearing on exam   - BNP 1590, CXR with potential edema   - Attempted a few days of mild diuresis with little improvement  - Heart failure likely not playing a major role in presentation  - Hold diuretics for now    #History of vocal cord dysfunction  - Likely not because of acute dyspnea, but can contribute to dyspnea     #Leukocytosis   - likely reactive with steroids   - Resolved    #Hyperkalemia   - Resolved     #Acute kidney injury  - pre-renal?  - Creatinine stable, potential underlying CAD?  - continue to monitor      #Prolonged QTc   - chronic   - improved on EKG today  - avoid QT prolonging meds if possible      #Polysubstance abuse   - hx heroin, cocaine  - in a methadone treatment program?  - methadone currently held   - UDS pending     #Tobacco use  -  to quit      #HTN  - Normotensive  - continue norvasc, lisinopril, clonidine   - aware that steroids may worsen      #HLD  - continue lipitor     #MY  - Reports she has a CPAP at home, but does not use  - will likely need follow-up with sleep medicine and new device      Case discussed with:  [x]Patient  []Family  []Nursing  []Case Management  DVT Prophylaxis:  []Lovenox  [x]Hep SQ  []SCDs  []Coumadin   []On Heparin gtt    Luciano Mullins II, MD  1/13/2021 12:56 PM

## 2021-01-13 NOTE — ROUTINE PROCESS
Bedside and Verbal shift change report given to ROSALINE Posada (oncoming nurse) by Irina Joiner RN (offgoing nurse). Report included the following information SBAR, Kardex, MAR and Recent Results.

## 2021-01-13 NOTE — PROGRESS NOTES
Bedside shift change report given to ROSALINE Oconnell (oncoming nurse) by Aubree Nevarez RN (offgoing nurse). Report included the following information SBAR, Kardex and MAR.

## 2021-01-13 NOTE — PROGRESS NOTES
Attempted to see patient for PT treatment however she is declining at this time due to wanting a breathing treatment. Nurse was notified of patient's request.  Will re-attempt later in the day.   Antony Pride, PT

## 2021-01-13 NOTE — PROGRESS NOTES
[facility]  Pulmonary, Critical Care, and Sleep Medicine    Pulmonary Medicine Progress Note    Name: Cindy Selby MRN: [id number]  : 1965 Hospital: [address] [place] [address] Dr  Date: 2021       Subjective:  Pt is refusing nebulizer treatments. She is also walking around the unit by herself without oxygen and getting very short of breath. She endorses a severe cough causing chest pain.      Patient Active Problem List   Diagnosis Code    Abnormal CT of the chest R93.89    Asthma J45.909    Respiratory failure ([address] J96.90    Acute exacerbation of COPD with asthma ([address] J44.1, J45.901    Essential hypertension I10    Morbid obesity due to excess calories ([address] E66.01    Hypertensive heart failure ([address] I11.0    Sleep apnea G47.30    COPD (chronic obstructive pulmonary disease) with chronic bronchitis ([address] J44.9    T wave inversion in EKG R94.31    Tobacco use Z72.0    S/P hernia repair Z98.890, Z87.19    CKD (chronic kidney disease) stage 3, GFR 30-59 ml/min N18.30    Cocaine abuse ([address] F14.10    Drug-seeking behavior Z76.5    Fibromyalgia M79.7    Depression F32.9    Fe deficiency anemia D50.9    Gastroesophageal reflux disease without esophagitis K21.9    HLD (hyperlipidemia) E78.5    Thyroid goiter E04.9    MDRO (multiple drug resistant organisms) resistance NNS3189    On home O2 Z99.81    Polysubstance abuse (HCC) E55.75    Uncomplicated severe persistent asthma J45.50    Vocal cord disease J38.3    COPD with acute exacerbation (HCC) J44.1    Chronic respiratory failure with hypoxia ([facility]) J96.11    Acute bronchitis J20.9    Suspected COVID-19 virus infection Z20.822    Tachycardia R00.0    Normocytic anemia D64.9    Acute respiratory distress R06.03    COPD exacerbation (HCC) J44.1    Acute exacerbation of chronic obstructive pulmonary disease (COPD) ([facility]) J44.1    UTI (urinary tract infection) N39.0    Atypical chest pain R07.89    DEMARCUS (acute kidney injury) ([facility] N17.9    Asthma exacerbation J45. 0    COPD (chronic obstructive pulmonary disease) ([facility]) J44.9    Acute bronchitis with chronic obstructive pulmonary disease (COPD) ([facility]) J44.0, J20.9    CHF (congestive heart failure) ([facility]) I50.9    Long QT interval R94.31       Assessment:  · COPD/Asthma Exacerbation - 2/2 Viral PNA. May also have bacterial component given elevated WBC with yellow productive sputum. On 4L home O2. Did follow with Moncho Guevara however admits she hasn't seen a pulmonologist in a while. COVID-19 (-) ([facility])  · (+) Rhinovirus and Enterovirus - Respiratory Panel (+)(01/10/21)  · (+) Nicotine Smoker - 1pk /1-2 wks  · Substance Abuse: reports taking methadone, denies other drug use: UDS + Cocaine and methadone 12/16/20  · R/O MY - States she has a PAP machine at home but it is full of mold and so she doesn't use it anymore --> needs a new device  · Hx of Vocal Cord Dysfunction Syndrome - Noted on [facility], Previously treated by [facility]  · Hx of Non-compliance  · Hx of Thyroid Disorder  · Anxiety with Grief response: Reports death of brother and mother 4 months ago and 33 y/o daughter about one year ago     Impression/Plan:  · SpO2 goal >90%-94; titrate supp O2 PRN; currently resting on NC  · Pt continuing to refuse nebulizers and is significantly hindering her improvement- discussed compliance with current therapy  · Con't duo-nebs q4  · BID Pulmicort and Brovana nebs  · Solu-Medrol 60mg q6. Would not taper yet. · Will add guaifenasin/codeine for cough  · Con't Singulair  · Would hold diuretics. · Assess home Oxygen needs at discharge  · OT, PT, OOB and ambulate  · Will Follow    FiO2 to keep SpO2 >=92%, HOB >=30 degree, aspiration precautions, aggressive pulmonary toileting, incentive spirometry. Other issues management by primary team and respective consultants. Events and notes from last 24 hours reviewed.    Discussed with patient and family, answered all questions to their satisfaction. Care plan discussed with nursing.      Labs and images personally seen and available reports reviewed  All current medicines are reviewed       Medications- Current:  Current Facility-Administered Medications   Medication Dose Route Frequency    guaiFENesin-codeine (ROBITUSSIN AC) 100-10 mg/5 mL solution 10 mL  10 mL Oral Q6H PRN    cefepime (MAXIPIME) 2 g in sterile water (preservative free) 10 mL IV syringe  2 g IntraVENous Q12H    diphenhydrAMINE (BENADRYL) injection 25 mg  25 mg IntraVENous Q6H PRN    furosemide (LASIX) tablet 20 mg  20 mg Oral DAILY    arformoteroL (BROVANA) neb solution 15 mcg  15 mcg Nebulization BID RT    budesonide (PULMICORT) 500 mcg/2 ml nebulizer suspension  1,000 mcg Nebulization BID RT    amLODIPine (NORVASC) tablet 10 mg  10 mg Oral DAILY    aspirin delayed-release tablet 81 mg  81 mg Oral DAILY    atorvastatin (LIPITOR) tablet 20 mg  20 mg Oral QHS    famotidine (PEPCID) tablet 20 mg  20 mg Oral DAILY    [Held by provider] lisinopriL (PRINIVIL, ZESTRIL) tablet 20 mg  20 mg Oral DAILY    montelukast (SINGULAIR) tablet 10 mg  10 mg Oral QHS    sodium chloride (NS) flush 5-40 mL  5-40 mL IntraVENous Q8H    sodium chloride (NS) flush 5-40 mL  5-40 mL IntraVENous PRN    acetaminophen (TYLENOL) tablet 650 mg  650 mg Oral Q6H PRN    Or    acetaminophen (TYLENOL) suppository 650 mg  650 mg Rectal Q6H PRN    polyethylene glycol (MIRALAX) packet 17 g  17 g Oral DAILY PRN    senna-docusate (PERICOLACE) 8.6-50 mg per tablet 1 Tab  1 Tab Oral BID    heparin (porcine) injection 5,000 Units  5,000 Units SubCUTAneous Q8H    methylPREDNISolone (PF) (SOLU-MEDROL) injection 60 mg  60 mg IntraVENous Q6H    guaiFENesin ER (MUCINEX) tablet 600 mg  600 mg Oral Q12H    sodium chloride (NS) flush 5-40 mL  5-40 mL IntraVENous Q8H    sodium chloride (NS) flush 5-40 mL  5-40 mL IntraVENous PRN    polyethylene glycol (MIRALAX) packet 17 g  17 g Oral DAILY PRN    albuterol (ACCUNEB) nebulizer solution 2.5 mg  2.5 mg Inhalation Q4H PRN    nitroglycerin (NITROSTAT) tablet 0.4 mg  0.4 mg SubLINGual Q5MIN PRN    albuterol-ipratropium (DUO-NEB) 2.5 MG-0.5 MG/3 ML  3 mL Nebulization Q4H RT    cloNIDine HCL (CATAPRES) tablet 0.2 mg  0.2 mg Oral BID    azithromycin (ZITHROMAX) 500 mg in 0.9% sodium chloride 250 mL (VIAL-MATE)  500 mg IntraVENous Q24H       Objective:  Vital Signs:    Visit Vitals  BP (!) 158/68 (BP 1 Location: Right arm, BP Patient Position: At rest)   Pulse 93   Temp 98.2 °F (36.8 °C)   Resp 16   LMP 2009   SpO2 95%   Breastfeeding No      O2 Device: Nasal cannula  O2 Flow Rate (L/min): 5 l/min  Temp (24hrs), Av °F (36.7 °C), Min:97.6 °F (36.4 °C), Max:98.7 °F (37.1 °C)      Intake/Output:   Last shift:       07 -  1900  In: 120 [P.O.:120]  Out: -   Last 3 shifts: 1901 -  0700  In: 240 [P.O.:240]  Out: -     Intake/Output Summary (Last 24 hours) at 2021 1100  Last data filed at 2021 0851  Gross per 24 hour   Intake 120 ml   Output    Net 120 ml       Physical Exam:     General/Neurology: Alert, Awake  Head:   Normocephalic, without obvious abnormality  Eye:   PERRL, EOM intact  Oral:  Mucus membranes moist  Lung:   B/l air entry fair. Severe expiratory wheezing. Heart:   S1 S2 present  Abdomen:  Soft, non tender, BS+nt   Extremities:  No pedal edema. Skin:   Dry, intact  Data:      No results found for this or any previous visit (from the past 24 hour(s)).       Chemistry   Recent Labs     21  0402 21  0604   * 113*    140   K 4.6 5.2    105   CO2 29 29   BUN 33* 25*   CREA 1.29 1.12   CA 8.3* 9.1   AGAP 5 6   BUCR 26* 22*   AP 77 82   TP 5.7* 6.7   ALB 2.7* 3.1*   GLOB 3.0 3.6   AGRAT 0.9 0.9       CBC w/Diff   Recent Labs     21  0402 21  0604   WBC 12.2 14.2*   RBC 3.84* 4.23   HGB 11.1* 12.2   HCT 35.3 38.4    249   GRANS 92* 90*   LYMPH 6* 7*   EOS 0 0       ABG No results for input(s): PHI, PHI, POC2, PCO2I, PO2, PO2I, HCO3, HCO3I, FIO2, FIO2I in the last 72 hours. Micro    Recent Labs     01/11/21  0840   CULT MODERATE NORMAL RESPIRATORY JORGE     Recent Labs     01/11/21  0840   CULT MODERATE NORMAL RESPIRATORY JORGE       CT (Most Recent)   Results from Hospital Encounter encounter on 10/11/20   CTA CHEST W OR W WO CONT    Narrative CTA CHEST PULMONARY EMBOLISM PROTOCOL        INDICATION: Heart failure, chronic kidney disease, cocaine abuse, oxygen  dependence, hypertension, fibromyalgia, morbid obesity, STEMI with exertional  chest and flank pain, pleuritic. . Question pulmonary embolism. TECHNIQUE: Thin collimation axial images obtained through the level of the  pulmonary arteries with additional imaging through the chest following the  uneventful administration of 100 mL Isovue-370 nonionic intravenous contrast.   Images reconstructed into three dimensional coronal and sagittal projections for  complete evaluation of the tortuous and overlapping pulmonary vascular  structures and to reduce patient radiation dose. All CT scans at this facility are performed using dose optimization technique as  appropriate to a performed exam, to include automated exposure control,  adjustment of the mA and/or kV according to patient size (including appropriate  matching first site-specific examinations), or use of iterative reconstruction  technique. COMPARISON: 10/8/2020; 7/26/2020. FINDINGS:    No filling defects are appreciated within the main, left, right, lobar or  visualized segmental pulmonary arteries to suggest embolism. Main pulmonary  artery is 3.1 cm transverse diameter. Thyroid: Right lobe is 4 cm and the left is 3 cm. It is diffusely heterogeneous  and likely nodular. Pericardium/ Heart: Left atrium appears dilated. Aorta/ Vessels: No aneurysm or dissection. Lymph Nodes: Unremarkable. .    Lungs: Mild central bronchial wall thickening. Limited evaluation secondary to  respiratory motion. Mild regions of subsegmental atelectasis or scarring lung  bases. Pleura: No effusion. Upper Abdomen: Left lateral hepatic cysts measure up to 1.5 cm. Left adrenal  thickening. Incompletely visualized at least 2.2 cm left upper pole renal cyst.    Bones/soft tissues: Mild anasarca. No acute bony findings. Impression IMPRESSION:  No evidence for pulmonary emboli. Mild bronchitis or central venous congestion. Main pulmonary artery is mildly enlarged as may be seen in pulmonary arterial  hypertension. Left atrium appears enlarged. Thyromegaly and multinodular goiter. Mild anasarca. XR (Most Recent). CXR reviewed by me and compared with previous CXR   Results from Hospital Encounter encounter on 01/09/21   XR CHEST PORT    Narrative Portable Frontal Chest.    CLINICAL HISTORY: Shortness of breath. TECHNIQUE: Single frontal view of the chest, obtained portably. COMPARISONS: 1/8/2020. FINDINGS:       Mild haziness in the left midlung field. Mild septal thickening right lung  base. . The cardiomediastinal silhouette is unremarkable. Pulmonary vascularity  is normal, however. There are no effusions. Impression IMPRESSION:    Likely mild edema. Pneumonitis less likely. Correlate clinically and consider  follow-up. See my orders for details     Total care time exclusive of procedures with complex decision making, coordination of care and counseling patient performed and > 50% time spent in face to face evaluation as mentioned above.     Cindi Beyer MD  Critical Care Medicine

## 2021-01-13 NOTE — PROGRESS NOTES
Arrived to assess pt for tx. She started off by saying she wanted treatment. I returned with the medication and she changed her mind. I told her I will check with her later and she can let me know when she wants a treatment and if she wants to wear NIPPV. No respiratory distress noted, patient is comfortable. Pt instructed to call if help is needed, call bell within reach.   Patient confirms understanding

## 2021-01-13 NOTE — MED STUDENT NOTES
*ATTENTION:  This note has been created by a medical student for educational purposes only. Please do not refer to the content of this note for clinical decision-making, billing, or other purposes. Please see attending physicians note to obtain clinical information on this patient. * Student Progress Note Please refer to attendings daily rounding note for full details Patient: Rosina Long MRN: [id number]  CSN: [id number] YOB: 1965  Age: 63 y.o. Sex: female DOA: 1/9/2021 LOS:  LOS: 4 days Chief Complaint:   
 
Subjective:  
Patient is experiencing shortness of breath this morning that makes her feel like she is suffocating. She has chest pain due to coughing. She claims to have a productive cough of thick, yellow mucous. Objective:  
  
Visit Vitals BP (!) 158/68 (BP 1 Location: Right arm, BP Patient Position: At rest) Pulse 93 Temp 98.2 °F (36.8 °C) Resp 16 SpO2 95% Breastfeeding No  
 
 
 
Physical Exam: 
Gen: Patient is sitting up in bed with labored breathing on nasal canula. HEENT: Neck supple, no JVD. CV: RRR, normal S1,S2. No murmurs, rubs, or gallops. Resp: Labored breathing through mouth. Wheezing auscultated bilaterally. Abd: Soft, non-distended. Extrem: No edema. Skin: Warm, dry. I have reviewed the patient's Labs and Radiology studies. Assessment:  
Acute COPD Exacerbation 2/2 viral PNA 
(+) Rhinovirus and Enterovirus Respiratory Panel Chronic Diastolic CHF Leukocytosis - resolved Hyperkalemia - resolved Acute Kidney Injury Cr improved 1.29 < 1.35 MY Chronic Prolonged QTC Substance Abuse Tobacco Use HTN  
HLD Plan:  
Continue cardiac diet, limit sodium intake. Currently on 5L supplemental oxygen, titrate as able. Maintain O2>92%. Continue DUO-NEB q4h. Continue arformoterol BID. Continue Budesonide BID. Continue methylprednisolone IV q6h. Continue montelukast 10 mg tablet at bedtime. Continue azithromycin 500 mg IV q24h and cefepime 2g IV q12h. Continue clonidine HCL 0.2 mg tablet BID and Mucinex 600 mg tablet q12h. Pulmonary participating in treatment. Mucomyst 200mg nebulizer q6h. Continue amlodipine 10 mg tablet for HTN. Continue lipitor 20 mg tablet for HLD. Continue Aspirin delayed release 81 mg tablet. Continue heparin SQ injection q8h. Continue to monitor BMP. PT/OT/OOB Encourage cessation of tobacco use . Bhavna Presser 1/13/2021 
10:44 AM 
*ATTENTION:  This note has been created by a medical student for educational purposes only. Please do not refer to the content of this note for clinical decision-making, billing, or other purposes. Please see attending physicians note to obtain clinical information on this patient. *

## 2021-01-13 NOTE — PROGRESS NOTES
Problem: Falls - Risk of  Goal: *Absence of Falls  Description: Document Zoraida Tinoco Fall Risk and appropriate interventions in the flowsheet.   Outcome: Progressing Towards Goal  Note: Fall Risk Interventions:  Mobility Interventions: Patient to call before getting OOB         Medication Interventions: Teach patient to arise slowly, Patient to call before getting OOB                   Problem: Chronic Obstructive Pulmonary Disease (COPD)  Goal: *Oxygen saturation during activity within specified parameters  Outcome: Progressing Towards Goal     Problem: Chronic Obstructive Pulmonary Disease (COPD)  Goal: *Able to remain out of bed as prescribed  Outcome: Progressing Towards Goal     Problem: Chronic Obstructive Pulmonary Disease (COPD)  Goal: *Absence of hypoxia  Outcome: Progressing Towards Goal

## 2021-01-14 LAB
ALBUMIN SERPL-MCNC: 2.4 G/DL (ref 3.4–5)
ALBUMIN/GLOB SERPL: 0.8 {RATIO} (ref 0.8–1.7)
ALP SERPL-CCNC: 80 U/L (ref 45–117)
ALT SERPL-CCNC: 35 U/L (ref 13–56)
ANION GAP SERPL CALC-SCNC: 3 MMOL/L (ref 3–18)
AST SERPL-CCNC: 8 U/L (ref 10–38)
ATRIAL RATE: 78 BPM
BACTERIA SPEC CULT: NORMAL
BACTERIA SPEC CULT: NORMAL
BASOPHILS # BLD: 0 K/UL (ref 0–0.1)
BASOPHILS NFR BLD: 0 % (ref 0–2)
BILIRUB SERPL-MCNC: 0.1 MG/DL (ref 0.2–1)
BUN SERPL-MCNC: 34 MG/DL (ref 7–18)
BUN/CREAT SERPL: 36 (ref 12–20)
CALCIUM SERPL-MCNC: 8.1 MG/DL (ref 8.5–10.1)
CALCULATED P AXIS, ECG09: 60 DEGREES
CALCULATED R AXIS, ECG10: 55 DEGREES
CALCULATED T AXIS, ECG11: 51 DEGREES
CHLORIDE SERPL-SCNC: 107 MMOL/L (ref 100–111)
CO2 SERPL-SCNC: 29 MMOL/L (ref 21–32)
CREAT SERPL-MCNC: 0.94 MG/DL (ref 0.6–1.3)
DIAGNOSIS, 93000: NORMAL
DIFFERENTIAL METHOD BLD: ABNORMAL
EOSINOPHIL # BLD: 0 K/UL (ref 0–0.4)
EOSINOPHIL NFR BLD: 0 % (ref 0–5)
ERYTHROCYTE [DISTWIDTH] IN BLOOD BY AUTOMATED COUNT: 12.8 % (ref 11.6–14.5)
GLOBULIN SER CALC-MCNC: 3.2 G/DL (ref 2–4)
GLUCOSE SERPL-MCNC: 124 MG/DL (ref 74–99)
HCT VFR BLD AUTO: 38.5 % (ref 35–45)
HGB BLD-MCNC: 12.2 G/DL (ref 12–16)
LYMPHOCYTES # BLD: 0.8 K/UL (ref 0.9–3.6)
LYMPHOCYTES NFR BLD: 6 % (ref 21–52)
MCH RBC QN AUTO: 28.2 PG (ref 24–34)
MCHC RBC AUTO-ENTMCNC: 31.7 G/DL (ref 31–37)
MCV RBC AUTO: 88.9 FL (ref 74–97)
MONOCYTES # BLD: 0.4 K/UL (ref 0.05–1.2)
MONOCYTES NFR BLD: 3 % (ref 3–10)
NEUTS SEG # BLD: 11.1 K/UL (ref 1.8–8)
NEUTS SEG NFR BLD: 91 % (ref 40–73)
P-R INTERVAL, ECG05: 130 MS
PLATELET # BLD AUTO: 289 K/UL (ref 135–420)
PMV BLD AUTO: 10.5 FL (ref 9.2–11.8)
POTASSIUM SERPL-SCNC: 4.3 MMOL/L (ref 3.5–5.5)
PROT SERPL-MCNC: 5.6 G/DL (ref 6.4–8.2)
Q-T INTERVAL, ECG07: 374 MS
QRS DURATION, ECG06: 86 MS
QTC CALCULATION (BEZET), ECG08: 426 MS
RBC # BLD AUTO: 4.33 M/UL (ref 4.2–5.3)
SERVICE CMNT-IMP: NORMAL
SERVICE CMNT-IMP: NORMAL
SODIUM SERPL-SCNC: 139 MMOL/L (ref 136–145)
VENTRICULAR RATE, ECG03: 78 BPM
WBC # BLD AUTO: 12.3 K/UL (ref 4.6–13.2)

## 2021-01-14 PROCEDURE — 74011250637 HC RX REV CODE- 250/637: Performed by: HOSPITALIST

## 2021-01-14 PROCEDURE — 97535 SELF CARE MNGMENT TRAINING: CPT

## 2021-01-14 PROCEDURE — 93005 ELECTROCARDIOGRAM TRACING: CPT

## 2021-01-14 PROCEDURE — 74011250636 HC RX REV CODE- 250/636: Performed by: HOSPITALIST

## 2021-01-14 PROCEDURE — 74011250637 HC RX REV CODE- 250/637: Performed by: INTERNAL MEDICINE

## 2021-01-14 PROCEDURE — 94640 AIRWAY INHALATION TREATMENT: CPT

## 2021-01-14 PROCEDURE — 74011000250 HC RX REV CODE- 250: Performed by: INTERNAL MEDICINE

## 2021-01-14 PROCEDURE — 36415 COLL VENOUS BLD VENIPUNCTURE: CPT

## 2021-01-14 PROCEDURE — 99233 SBSQ HOSP IP/OBS HIGH 50: CPT | Performed by: INTERNAL MEDICINE

## 2021-01-14 PROCEDURE — 65660000000 HC RM CCU STEPDOWN

## 2021-01-14 PROCEDURE — 80053 COMPREHEN METABOLIC PANEL: CPT

## 2021-01-14 PROCEDURE — 99232 SBSQ HOSP IP/OBS MODERATE 35: CPT | Performed by: HOSPITALIST

## 2021-01-14 PROCEDURE — 74011000250 HC RX REV CODE- 250: Performed by: HOSPITALIST

## 2021-01-14 PROCEDURE — 97530 THERAPEUTIC ACTIVITIES: CPT

## 2021-01-14 PROCEDURE — 74011250636 HC RX REV CODE- 250/636: Performed by: INTERNAL MEDICINE

## 2021-01-14 PROCEDURE — 85025 COMPLETE CBC W/AUTO DIFF WBC: CPT

## 2021-01-14 PROCEDURE — 2709999900 HC NON-CHARGEABLE SUPPLY

## 2021-01-14 RX ORDER — CITALOPRAM 20 MG/1
20 TABLET, FILM COATED ORAL DAILY
Status: DISCONTINUED | OUTPATIENT
Start: 2021-01-14 | End: 2021-01-18 | Stop reason: HOSPADM

## 2021-01-14 RX ORDER — CODEINE PHOSPHATE AND GUAIFENESIN 10; 100 MG/5ML; MG/5ML
5 SOLUTION ORAL
Status: DISCONTINUED | OUTPATIENT
Start: 2021-01-14 | End: 2021-01-18 | Stop reason: HOSPADM

## 2021-01-14 RX ORDER — ALPRAZOLAM 0.5 MG/1
0.25 TABLET ORAL
Status: COMPLETED | OUTPATIENT
Start: 2021-01-14 | End: 2021-01-14

## 2021-01-14 RX ORDER — ALPRAZOLAM 0.5 MG/1
0.25 TABLET ORAL
Status: DISCONTINUED | OUTPATIENT
Start: 2021-01-14 | End: 2021-01-17

## 2021-01-14 RX ADMIN — METHYLPREDNISOLONE SODIUM SUCCINATE 60 MG: 40 INJECTION, POWDER, FOR SOLUTION INTRAMUSCULAR; INTRAVENOUS at 06:21

## 2021-01-14 RX ADMIN — GUAIFENESIN 600 MG: 600 TABLET, EXTENDED RELEASE ORAL at 08:15

## 2021-01-14 RX ADMIN — Medication 10 ML: at 06:42

## 2021-01-14 RX ADMIN — ALPRAZOLAM 0.25 MG: 0.5 TABLET ORAL at 21:28

## 2021-01-14 RX ADMIN — METHYLPREDNISOLONE SODIUM SUCCINATE 60 MG: 40 INJECTION, POWDER, FOR SOLUTION INTRAMUSCULAR; INTRAVENOUS at 17:37

## 2021-01-14 RX ADMIN — CITALOPRAM HYDROBROMIDE 20 MG: 20 TABLET ORAL at 13:03

## 2021-01-14 RX ADMIN — ASPIRIN 81 MG: 81 TABLET, COATED ORAL at 08:14

## 2021-01-14 RX ADMIN — CEFEPIME HYDROCHLORIDE 2 G: 2 INJECTION, POWDER, FOR SOLUTION INTRAVENOUS at 21:31

## 2021-01-14 RX ADMIN — ALPRAZOLAM 0.25 MG: 0.5 TABLET ORAL at 08:17

## 2021-01-14 RX ADMIN — AZITHROMYCIN DIHYDRATE 500 MG: 500 INJECTION, POWDER, LYOPHILIZED, FOR SOLUTION INTRAVENOUS at 20:21

## 2021-01-14 RX ADMIN — Medication 10 ML: at 13:06

## 2021-01-14 RX ADMIN — MONTELUKAST 10 MG: 10 TABLET, FILM COATED ORAL at 21:29

## 2021-01-14 RX ADMIN — CLONIDINE HYDROCHLORIDE 0.2 MG: 0.1 TABLET ORAL at 17:37

## 2021-01-14 RX ADMIN — CLONIDINE HYDROCHLORIDE 0.2 MG: 0.1 TABLET ORAL at 08:15

## 2021-01-14 RX ADMIN — ATORVASTATIN CALCIUM 20 MG: 20 TABLET, FILM COATED ORAL at 21:31

## 2021-01-14 RX ADMIN — FAMOTIDINE 20 MG: 20 TABLET, FILM COATED ORAL at 08:14

## 2021-01-14 RX ADMIN — Medication 10 ML: at 17:38

## 2021-01-14 RX ADMIN — CEFEPIME HYDROCHLORIDE 2 G: 2 INJECTION, POWDER, FOR SOLUTION INTRAVENOUS at 08:15

## 2021-01-14 RX ADMIN — ALPRAZOLAM 0.25 MG: 0.5 TABLET ORAL at 13:04

## 2021-01-14 RX ADMIN — IPRATROPIUM BROMIDE AND ALBUTEROL SULFATE 3 ML: .5; 3 SOLUTION RESPIRATORY (INHALATION) at 11:00

## 2021-01-14 RX ADMIN — METHYLPREDNISOLONE SODIUM SUCCINATE 60 MG: 40 INJECTION, POWDER, FOR SOLUTION INTRAMUSCULAR; INTRAVENOUS at 13:05

## 2021-01-14 RX ADMIN — Medication 10 ML: at 21:30

## 2021-01-14 RX ADMIN — AMLODIPINE BESYLATE 10 MG: 10 TABLET ORAL at 08:15

## 2021-01-14 RX ADMIN — METHYLPREDNISOLONE SODIUM SUCCINATE 60 MG: 40 INJECTION, POWDER, FOR SOLUTION INTRAMUSCULAR; INTRAVENOUS at 01:16
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
## 2021-01-14 NOTE — PROGRESS NOTES
conducted a Follow up consultation and Spiritual Assessment for Gary Barth, who is a 63 y.o.,female. The  provided the following Interventions:  Ms. Brigida Bergman is being discharged today and needs some clothes. I provided some needed items from the \"clothes closet\". Continued the relationship of care and support. Listened empathically. Chart reviewed. The following outcomes were achieved:  Patient expressed gratitude for 's visit. Plan:  Chaplains will continue to follow and will provide pastoral care on an as needed/requested basis.  recommends bedside caregivers page  on duty if patient shows signs of acute spiritual or emotional distress.        [address] [place] [address]   [phone]
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
## 2021-01-14 NOTE — PROGRESS NOTES
Problem: Patient Education: Go to Patient Education Activity  Goal: Patient/Family Education  Outcome: Progressing Towards Goal     Problem: Patient Education: Go to Patient Education Activity  Goal: Patient/Family Education  Outcome: Progressing Towards Goal     Problem: Falls - Risk of  Goal: *Absence of Falls  Description: Document Leafy Crook Fall Risk and appropriate interventions in the flowsheet.   Outcome: Progressing Towards Goal  Note: Fall Risk Interventions:  Mobility Interventions: Patient to call before getting OOB         Medication Interventions: Patient to call before getting OOB, Teach patient to arise slowly         History of Falls Interventions: Bed/chair exit alarm

## 2021-01-14 NOTE — PROGRESS NOTES
Bedside shift change report given to ROSALINE Oconnell (oncoming nurse) by ROSALINE Vera (offgoing nurse). Report included the following information SBAR, Kardex and MAR.

## 2021-01-14 NOTE — MED STUDENT NOTES
*ATTENTION:  This note has been created by a medical student for educational purposes only. Please do not refer to the content of this note for clinical decision-making, billing, or other purposes. Please see attending physicians note to obtain clinical information on this patient. * Student Progress Note Please refer to attendings daily rounding note for full details Patient: Yolande Colmenares MRN: [id number]  CSN: [id number] YOB: 1965  Age: 63 y.o. Sex: female DOA: 1/9/2021 LOS:  LOS: 5 days Chief Complaint:   
 
Subjective:  
Patient was seen ambulating through the hallways this morning. When formally visited patient in her room, she had no new complaints other than not receiving her \"psych medications. \" Patient still has a productive cough. Objective:  
  
Visit Vitals BP (!) 149/71 (BP 1 Location: Right arm, BP Patient Position: At rest) Pulse 80 Temp 97.6 °F (36.4 °C) Resp 18 SpO2 94% Breastfeeding No  
 
 
 
Physical Exam: 
Gen: Patient is lying in bed eating in no acute distress. HEENT: Neck supple, no JVD. CV: RRR, normal S1, S2. No murmurs, rubs or gallops. Resp: Labored breathing through mouth. Coughing frequently. Diffuse wheezing auscultated. Abd: Soft, non-distended. Extrem:  No edema. Skin: Warm, dry. I have reviewed the patient's Labs and Radiology studies. Assessment:  
Acute COPD Exacerbation 2/2 viral PNA 
(+) Rhinovirus and Enterovirus Respiratory Panel  
  
Chronic Diastolic CHF Leukocytosis - resolved Hyperkalemia - resolved  
  
Acute Kidney Injury - improved Cr improved 0.94<1.29 < 1.35 
  
MY Chronic Prolonged QTC Substance Abuse 1/13 UDS (+) opiates Tobacco Use HTN  
HLD Plan:  
Continue cardiac diet, limit sodium intake. Currently on 5L supplemental oxygen, titrate as able. Maintain O2>92%. Continue DUO-NEB q4h. Continue arformoterol BID. Continue Budesonide BID. Continue methylprednisolone IV q6h. Continue montelukast 10 mg tablet at bedtime. Continue azithromycin 500 mg IV q24h and cefepime 2g IV q12h. Continue clonidine HCL 0.2 mg tablet BID and Mucinex 600 mg tablet q12h. Continue Mucomyst 200mg nebulizer q6h. Pulmonary participating in treatment. Continue amlodipine 10 mg tablet for HTN. Continue lipitor 20 mg tablet for HLD. Continue Aspirin delayed release 81 mg tablet. Continue heparin SQ injection q8h. Citalopram 20 mg tablet in the morning. Follow EKGs. Xanax 0.25 mg tablet q12h PRN. Continue to monitor BMP. PT/OT/OOB to ambulate around room. Encourage cessation of tobacco and substance use . Viky Carter 1/14/2021 
11:44 AM 
*ATTENTION:  This note has been created by a medical student for educational purposes only. Please do not refer to the content of this note for clinical decision-making, billing, or other purposes. Please see attending physicians note to obtain clinical information on this patient. *

## 2021-01-14 NOTE — PROGRESS NOTES
Problem: Falls - Risk of  Goal: *Absence of Falls  Description: Document Naun Cease Fall Risk and appropriate interventions in the flowsheet.   Outcome: Progressing Towards Goal  Note: Fall Risk Interventions:  Mobility Interventions: Patient to call before getting OOB         Medication Interventions: Teach patient to arise slowly         History of Falls Interventions: Bed/chair exit alarm         Problem: Chronic Obstructive Pulmonary Disease (COPD)  Goal: *Oxygen saturation during activity within specified parameters  Outcome: Progressing Towards Goal  Goal: *Able to remain out of bed as prescribed  Outcome: Progressing Towards Goal  Goal: *Absence of hypoxia  Outcome: Progressing Towards Goal  Goal: *Optimize nutritional status  Outcome: Progressing Towards Goal
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
## 2021-01-14 NOTE — ROUTINE PROCESS
Bedside and Verbal shift change report given to ROSALINE Vera (oncoming nurse) by Yefri Gillespie RN (offgoing nurse). Report included the following information SBAR, Kardex, MAR and Recent Results.

## 2021-01-14 NOTE — PROGRESS NOTES
[facility]  Pulmonary, Critical Care, and Sleep Medicine    Pulmonary Medicine Progress Note    Name: Juanpablo Franklin MRN: [id number]  : 1965 Hospital: [facility]  Date: 2021       Subjective:  Pt is still out of her room frequently and missing nebulizer treatments.  Still very tight and wheezing on exam. Apparently threatening the staff this AM.     Patient Active Problem List   Diagnosis Code    Abnormal CT of the chest R93.89    Asthma J45.909    Respiratory failure ([address] J96.90    Acute exacerbation of COPD with asthma ([address] J44.1, J39.36    Essential hypertension I10    Morbid obesity due to excess calories ([address] E66.01    Hypertensive heart failure ([address] I11.0    Sleep apnea G47.30    COPD (chronic obstructive pulmonary disease) with chronic bronchitis ([address] J44.9    T wave inversion in EKG R94.31    Tobacco use Z72.0    S/P hernia repair Z98.890, Z87.19    CKD (chronic kidney disease) stage 3, GFR 30-59 ml/min N18.30    Cocaine abuse ([address] F14.10    Drug-seeking behavior Z76.5    Fibromyalgia M79.7    Depression F32.9    Fe deficiency anemia D50.9    Gastroesophageal reflux disease without esophagitis K21.9    HLD (hyperlipidemia) E78.5    Thyroid goiter E04.9    MDRO (multiple drug resistant organisms) resistance [id number]    On home O2 Z99.81    Polysubstance abuse ([address] I72.43    Uncomplicated severe persistent asthma J45.50    Vocal cord disease J38.3    COPD with acute exacerbation (HCC) J44.1    Chronic respiratory failure with hypoxia (HCC) J96.11    Acute bronchitis J20.9    Suspected COVID-19 virus infection Z20.822    Tachycardia R00.0    Normocytic anemia D64.9    Acute respiratory distress R06.03    COPD exacerbation (HCC) J44.1    Acute exacerbation of chronic obstructive pulmonary disease (COPD) (HCC) J44.1    UTI (urinary tract infection) N39.0    Atypical chest pain R07.89    DEMARCUS (acute kidney injury) ([address] N17.9    Asthma exacerbation J45. 0    COPD (chronic obstructive pulmonary disease) ([facility]) J44.9    Acute bronchitis with chronic obstructive pulmonary disease (COPD) ([facility]) J44.0, J20.9    CHF (congestive heart failure) ([facility]) I50.9    Long QT interval R94.31       Assessment:  · COPD/Asthma Exacerbation - 2/2 Viral PNA. May also have bacterial component given elevated WBC with yellow productive sputum. On 4L home O2. Did follow with Awais Azul however admits she hasn't seen a pulmonologist in a while. COVID-19 (-) ([employer])  · (+) Rhinovirus and Enterovirus - Respiratory Panel (+)(01/10/21)  · (+) Nicotine Smoker - 1pk /1-2 wks  · Substance Abuse: reports taking methadone, denies other drug use: UDS + Cocaine and methadone 12/16/20  · R/O MY - States she has a PAP machine at home but it is full of mold and so she doesn't use it anymore --> needs a new device  · Hx of Vocal Cord Dysfunction Syndrome - Noted on [facility] [place], Previously treated by Bin Munoz  · Hx of Non-compliance  · Hx of Thyroid Disorder  · Anxiety with Grief response: Reports death of brother and mother 4 months ago and 33 y/o daughter about one year ago     Impression/Plan:  · SpO2 goal >90%-94; titrate supp O2 PRN; currently resting on NC  · Pt continuing to refuse nebulizers and is significantly hindering her improvement- discussed compliance with current therapy  · Con't duo-nebs q4  · BID Pulmicort and Brovana nebs  · Solu-Medrol 60mg q6. Would not taper until some improvement noted. · Will add guaifenasin/codeine for cough  · Con't Singulair  · Assess home Oxygen needs at discharge  · OT, PT, OOB and ambulate  · Will Follow    FiO2 to keep SpO2 >=92%, HOB >=30 degree, aspiration precautions, aggressive pulmonary toileting, incentive spirometry. Other issues management by primary team and respective consultants. Events and notes from last 24 hours reviewed.    Discussed with patient and family, answered all questions to their satisfaction. Care plan discussed with nursing.      Labs and images personally seen and available reports reviewed  All current medicines are reviewed       Medications- Current:  Current Facility-Administered Medications   Medication Dose Route Frequency    citalopram (CELEXA) tablet 20 mg  20 mg Oral DAILY    ALPRAZolam (XANAX) tablet 0.25 mg  0.25 mg Oral Q8H PRN    guaiFENesin-codeine (ROBITUSSIN AC) 100-10 mg/5 mL solution 5 mL  5 mL Oral Q4H PRN    acetylcysteine (MUCOMYST) 100 mg/mL (10 %) nebulizer solution 200 mg  2 mL Nebulization Q6H RT    cefepime (MAXIPIME) 2 g in sterile water (preservative free) 10 mL IV syringe  2 g IntraVENous Q12H    arformoteroL (BROVANA) neb solution 15 mcg  15 mcg Nebulization BID RT    budesonide (PULMICORT) 500 mcg/2 ml nebulizer suspension  1,000 mcg Nebulization BID RT    amLODIPine (NORVASC) tablet 10 mg  10 mg Oral DAILY    aspirin delayed-release tablet 81 mg  81 mg Oral DAILY    atorvastatin (LIPITOR) tablet 20 mg  20 mg Oral QHS    famotidine (PEPCID) tablet 20 mg  20 mg Oral DAILY    [Held by provider] lisinopriL (PRINIVIL, ZESTRIL) tablet 20 mg  20 mg Oral DAILY    montelukast (SINGULAIR) tablet 10 mg  10 mg Oral QHS    sodium chloride (NS) flush 5-40 mL  5-40 mL IntraVENous Q8H    sodium chloride (NS) flush 5-40 mL  5-40 mL IntraVENous PRN    acetaminophen (TYLENOL) tablet 650 mg  650 mg Oral Q6H PRN    Or    acetaminophen (TYLENOL) suppository 650 mg  650 mg Rectal Q6H PRN    polyethylene glycol (MIRALAX) packet 17 g  17 g Oral DAILY PRN    senna-docusate (PERICOLACE) 8.6-50 mg per tablet 1 Tab  1 Tab Oral BID    heparin (porcine) injection 5,000 Units  5,000 Units SubCUTAneous Q8H    methylPREDNISolone (PF) (SOLU-MEDROL) injection 60 mg  60 mg IntraVENous Q6H    sodium chloride (NS) flush 5-40 mL  5-40 mL IntraVENous Q8H    sodium chloride (NS) flush 5-40 mL  5-40 mL IntraVENous PRN    albuterol (ACCUNEB) nebulizer solution 2.5 mg  2.5 mg Inhalation Q4H PRN    nitroglycerin (NITROSTAT) tablet 0.4 mg  0.4 mg SubLINGual Q5MIN PRN    albuterol-ipratropium (DUO-NEB) 2.5 MG-0.5 MG/3 ML  3 mL Nebulization Q4H RT    cloNIDine HCL (CATAPRES) tablet 0.2 mg  0.2 mg Oral BID    azithromycin (ZITHROMAX) 500 mg in 0.9% sodium chloride 250 mL (VIAL-MATE)  500 mg IntraVENous Q24H       Objective:  Vital Signs:    Visit Vitals  /67   Pulse 93   Temp 97.6 °F (36.4 °C)   Resp 20   LMP 2009   SpO2 100%   Breastfeeding No      O2 Device: Room air(02 on standby)  O2 Flow Rate (L/min): 5 l/min  Temp (24hrs), Av.6 °F (36.4 °C), Min:97.2 °F (36.2 °C), Max:98.1 °F (36.7 °C)      Intake/Output:   Last shift:      No intake/output data recorded. Last 3 shifts:  1901 -  0700  In: 240 [P.O.:240]  Out: -   No intake or output data in the 24 hours ending 21 1509    Physical Exam:     General/Neurology: Alert, Awake  Head:   Normocephalic, without obvious abnormality  Eye:   PERRL, EOM intact  Oral:  Mucus membranes moist  Lung:   B/l air entry fair. Severe expiratory wheezing. Heart:   S1 S2 present  Abdomen:  Soft, non tender, BS+nt   Extremities:  No pedal edema.    Skin:   Dry, intact  Data:      Recent Results (from the past 24 hour(s))   DRUG SCREEN, URINE    Collection Time: 21  3:40 PM   Result Value Ref Range    BENZODIAZEPINES Negative NEG      BARBITURATES Negative NEG      THC (TH-CANNABINOL) Negative NEG      OPIATES Positive (A) NEG      PCP(PHENCYCLIDINE) Negative NEG      COCAINE Negative NEG      AMPHETAMINES Negative NEG      METHADONE Negative NEG      HDSCOM (NOTE)    CBC WITH AUTOMATED DIFF    Collection Time: 21  3:06 AM   Result Value Ref Range    WBC 12.3 4.6 - 13.2 K/uL    RBC 4.33 4.20 - 5.30 M/uL    HGB 12.2 12.0 - 16.0 g/dL    HCT 38.5 35.0 - 45.0 %    MCV 88.9 74.0 - 97.0 FL    MCH 28.2 24.0 - 34.0 PG    MCHC 31.7 31.0 - 37.0 g/dL    RDW 12.8 11.6 - 14.5 %    PLATELET 476 533 - 420 K/uL    MPV 10.5 9.2 - 11.8 FL    NEUTROPHILS 91 (H) 40 - 73 %    LYMPHOCYTES 6 (L) 21 - 52 %    MONOCYTES 3 3 - 10 %    EOSINOPHILS 0 0 - 5 %    BASOPHILS 0 0 - 2 %    ABS. NEUTROPHILS 11.1 (H) 1.8 - 8.0 K/UL    ABS. LYMPHOCYTES 0.8 (L) 0.9 - 3.6 K/UL    ABS. MONOCYTES 0.4 0.05 - 1.2 K/UL    ABS. EOSINOPHILS 0.0 0.0 - 0.4 K/UL    ABS. BASOPHILS 0.0 0.0 - 0.1 K/UL    DF AUTOMATED     METABOLIC PANEL, COMPREHENSIVE    Collection Time: 01/14/21  3:06 AM   Result Value Ref Range    Sodium 139 136 - 145 mmol/L    Potassium 4.3 3.5 - 5.5 mmol/L    Chloride 107 100 - 111 mmol/L    CO2 29 21 - 32 mmol/L    Anion gap 3 3.0 - 18 mmol/L    Glucose 124 (H) 74 - 99 mg/dL    BUN 34 (H) 7.0 - 18 MG/DL    Creatinine 0.94 0.6 - 1.3 MG/DL    BUN/Creatinine ratio 36 (H) 12 - 20      GFR est AA >60 >60 ml/min/1.73m2    GFR est non-AA >60 >60 ml/min/1.73m2    Calcium 8.1 (L) 8.5 - 10.1 MG/DL    Bilirubin, total 0.1 (L) 0.2 - 1.0 MG/DL    ALT (SGPT) 35 13 - 56 U/L    AST (SGOT) 8 (L) 10 - 38 U/L    Alk. phosphatase 80 45 - 117 U/L    Protein, total 5.6 (L) 6.4 - 8.2 g/dL    Albumin 2.4 (L) 3.4 - 5.0 g/dL    Globulin 3.2 2.0 - 4.0 g/dL    A-G Ratio 0.8 0.8 - 1.7           Chemistry   Recent Labs     01/14/21  0306 01/12/21  0402   * 115*    141   K 4.3 4.6    107   CO2 29 29   BUN 34* 33*   CREA 0.94 1.29   CA 8.1* 8.3*   AGAP 3 5   BUCR 36* 26*   AP 80 77   TP 5.6* 5.7*   ALB 2.4* 2.7*   GLOB 3.2 3.0   AGRAT 0.8 0.9       CBC w/Diff   Recent Labs     01/14/21  0306 01/12/21  0402   WBC 12.3 12.2   RBC 4.33 3.84*   HGB 12.2 11.1*   HCT 38.5 35.3    248   GRANS 91* 92*   LYMPH 6* 6*   EOS 0 0       ABG No results for input(s): PHI, PHI, POC2, PCO2I, PO2, PO2I, HCO3, HCO3I, FIO2, FIO2I in the last 72 hours. Micro    No results for input(s): SDES, CULT in the last 72 hours. No results for input(s): CULT in the last 72 hours.     CT (Most Recent)   Results from Hospital Encounter encounter on 10/11/20   CTA CHEST W OR W WO CONT    Narrative CTA CHEST PULMONARY EMBOLISM PROTOCOL        INDICATION: Heart failure, chronic kidney disease, cocaine abuse, oxygen  dependence, hypertension, fibromyalgia, morbid obesity, STEMI with exertional  chest and flank pain, pleuritic. . Question pulmonary embolism. TECHNIQUE: Thin collimation axial images obtained through the level of the  pulmonary arteries with additional imaging through the chest following the  uneventful administration of 100 mL Isovue-370 nonionic intravenous contrast.   Images reconstructed into three dimensional coronal and sagittal projections for  complete evaluation of the tortuous and overlapping pulmonary vascular  structures and to reduce patient radiation dose. All CT scans at this facility are performed using dose optimization technique as  appropriate to a performed exam, to include automated exposure control,  adjustment of the mA and/or kV according to patient size (including appropriate  matching first site-specific examinations), or use of iterative reconstruction  technique. COMPARISON: 10/8/2020; 7/26/2020. FINDINGS:    No filling defects are appreciated within the main, left, right, lobar or  visualized segmental pulmonary arteries to suggest embolism. Main pulmonary  artery is 3.1 cm transverse diameter. Thyroid: Right lobe is 4 cm and the left is 3 cm. It is diffusely heterogeneous  and likely nodular. Pericardium/ Heart: Left atrium appears dilated. Aorta/ Vessels: No aneurysm or dissection. Lymph Nodes: Unremarkable. .    Lungs: Mild central bronchial wall thickening. Limited evaluation secondary to  respiratory motion. Mild regions of subsegmental atelectasis or scarring lung  bases. Pleura: No effusion. Upper Abdomen: Left lateral hepatic cysts measure up to 1.5 cm. Left adrenal  thickening. Incompletely visualized at least 2.2 cm left upper pole renal cyst.    Bones/soft tissues: Mild anasarca. No acute bony findings. Impression IMPRESSION:  No evidence for pulmonary emboli. Mild bronchitis or central venous congestion. Main pulmonary artery is mildly enlarged as may be seen in pulmonary arterial  hypertension. Left atrium appears enlarged. Thyromegaly and multinodular goiter. Mild anasarca. XR (Most Recent). CXR reviewed by me and compared with previous CXR   Results from Hospital Encounter encounter on 01/09/21   XR CHEST PORT    Narrative Portable Frontal Chest.    CLINICAL HISTORY: Shortness of breath. TECHNIQUE: Single frontal view of the chest, obtained portably. COMPARISONS: 1/8/2020. FINDINGS:       Mild haziness in the left midlung field. Mild septal thickening right lung  base. . The cardiomediastinal silhouette is unremarkable. Pulmonary vascularity  is normal, however. There are no effusions. Impression IMPRESSION:    Likely mild edema. Pneumonitis less likely. Correlate clinically and consider  follow-up. See my orders for details     Total care time exclusive of procedures with complex decision making, coordination of care and counseling patient performed and > 50% time spent in face to face evaluation as mentioned above.     Herman Cam MD  Critical Care Medicine

## 2021-01-15 LAB
ALBUMIN SERPL-MCNC: 2.2 G/DL (ref 3.4–5)
ALBUMIN/GLOB SERPL: 0.6 {RATIO} (ref 0.8–1.7)
ALP SERPL-CCNC: 72 U/L (ref 45–117)
ALT SERPL-CCNC: 32 U/L (ref 13–56)
ANION GAP SERPL CALC-SCNC: 6 MMOL/L (ref 3–18)
AST SERPL-CCNC: 24 U/L (ref 10–38)
BASOPHILS # BLD: 0 K/UL (ref 0–0.1)
BASOPHILS NFR BLD: 0 % (ref 0–2)
BILIRUB SERPL-MCNC: 0.6 MG/DL (ref 0.2–1)
BUN SERPL-MCNC: 27 MG/DL (ref 7–18)
BUN/CREAT SERPL: 33 (ref 12–20)
CALCIUM SERPL-MCNC: 7.9 MG/DL (ref 8.5–10.1)
CHLORIDE SERPL-SCNC: 109 MMOL/L (ref 100–111)
CO2 SERPL-SCNC: 25 MMOL/L (ref 21–32)
CREAT SERPL-MCNC: 0.81 MG/DL (ref 0.6–1.3)
DIFFERENTIAL METHOD BLD: ABNORMAL
EOSINOPHIL # BLD: 0 K/UL (ref 0–0.4)
EOSINOPHIL NFR BLD: 0 % (ref 0–5)
ERYTHROCYTE [DISTWIDTH] IN BLOOD BY AUTOMATED COUNT: 12.7 % (ref 11.6–14.5)
GLOBULIN SER CALC-MCNC: 3.5 G/DL (ref 2–4)
GLUCOSE SERPL-MCNC: 122 MG/DL (ref 74–99)
HCT VFR BLD AUTO: 39.3 % (ref 35–45)
HGB BLD-MCNC: 12.7 G/DL (ref 12–16)
LYMPHOCYTES # BLD: 1.2 K/UL (ref 0.9–3.6)
LYMPHOCYTES NFR BLD: 9 % (ref 21–52)
MCH RBC QN AUTO: 28.9 PG (ref 24–34)
MCHC RBC AUTO-ENTMCNC: 32.3 G/DL (ref 31–37)
MCV RBC AUTO: 89.3 FL (ref 74–97)
MONOCYTES # BLD: 0.4 K/UL (ref 0.05–1.2)
MONOCYTES NFR BLD: 3 % (ref 3–10)
NEUTS SEG # BLD: 11 K/UL (ref 1.8–8)
NEUTS SEG NFR BLD: 88 % (ref 40–73)
PLATELET # BLD AUTO: 280 K/UL (ref 135–420)
PMV BLD AUTO: 10.7 FL (ref 9.2–11.8)
POTASSIUM SERPL-SCNC: 5.2 MMOL/L (ref 3.5–5.5)
PROT SERPL-MCNC: 5.7 G/DL (ref 6.4–8.2)
RBC # BLD AUTO: 4.4 M/UL (ref 4.2–5.3)
SODIUM SERPL-SCNC: 140 MMOL/L (ref 136–145)
WBC # BLD AUTO: 12.7 K/UL (ref 4.6–13.2)

## 2021-01-15 PROCEDURE — 74011000250 HC RX REV CODE- 250: Performed by: INTERNAL MEDICINE

## 2021-01-15 PROCEDURE — 93005 ELECTROCARDIOGRAM TRACING: CPT

## 2021-01-15 PROCEDURE — 74011000250 HC RX REV CODE- 250: Performed by: PHYSICIAN ASSISTANT

## 2021-01-15 PROCEDURE — 74011250636 HC RX REV CODE- 250/636: Performed by: HOSPITALIST

## 2021-01-15 PROCEDURE — 85025 COMPLETE CBC W/AUTO DIFF WBC: CPT

## 2021-01-15 PROCEDURE — 74011250636 HC RX REV CODE- 250/636: Performed by: INTERNAL MEDICINE

## 2021-01-15 PROCEDURE — 65660000000 HC RM CCU STEPDOWN

## 2021-01-15 PROCEDURE — 2709999900 HC NON-CHARGEABLE SUPPLY

## 2021-01-15 PROCEDURE — 99233 SBSQ HOSP IP/OBS HIGH 50: CPT | Performed by: INTERNAL MEDICINE

## 2021-01-15 PROCEDURE — 94668 MNPJ CHEST WALL SBSQ: CPT

## 2021-01-15 PROCEDURE — 99232 SBSQ HOSP IP/OBS MODERATE 35: CPT | Performed by: HOSPITALIST

## 2021-01-15 PROCEDURE — 80053 COMPREHEN METABOLIC PANEL: CPT

## 2021-01-15 PROCEDURE — 77010033678 HC OXYGEN DAILY

## 2021-01-15 PROCEDURE — 36415 COLL VENOUS BLD VENIPUNCTURE: CPT

## 2021-01-15 PROCEDURE — 74011250637 HC RX REV CODE- 250/637: Performed by: INTERNAL MEDICINE

## 2021-01-15 PROCEDURE — 94640 AIRWAY INHALATION TREATMENT: CPT

## 2021-01-15 PROCEDURE — 74011250637 HC RX REV CODE- 250/637: Performed by: HOSPITALIST

## 2021-01-15 RX ADMIN — CITALOPRAM HYDROBROMIDE 20 MG: 20 TABLET ORAL at 08:58

## 2021-01-15 RX ADMIN — BUDESONIDE 500 MCG: 0.5 SUSPENSION RESPIRATORY (INHALATION) at 07:49

## 2021-01-15 RX ADMIN — AMLODIPINE BESYLATE 10 MG: 10 TABLET ORAL at 08:58

## 2021-01-15 RX ADMIN — ATORVASTATIN CALCIUM 20 MG: 20 TABLET, FILM COATED ORAL at 21:03

## 2021-01-15 RX ADMIN — Medication 10 ML: at 13:11

## 2021-01-15 RX ADMIN — CLONIDINE HYDROCHLORIDE 0.2 MG: 0.1 TABLET ORAL at 08:58

## 2021-01-15 RX ADMIN — ALPRAZOLAM 0.25 MG: 0.5 TABLET ORAL at 05:15

## 2021-01-15 RX ADMIN — GUAIFENESIN AND CODEINE PHOSPHATE 5 ML: 100; 10 SOLUTION ORAL at 10:01

## 2021-01-15 RX ADMIN — CLONIDINE HYDROCHLORIDE 0.2 MG: 0.1 TABLET ORAL at 17:36

## 2021-01-15 RX ADMIN — METHYLPREDNISOLONE SODIUM SUCCINATE 60 MG: 40 INJECTION, POWDER, FOR SOLUTION INTRAMUSCULAR; INTRAVENOUS at 17:37

## 2021-01-15 RX ADMIN — ARFORMOTEROL TARTRATE 15 MCG: 15 SOLUTION RESPIRATORY (INHALATION) at 07:49

## 2021-01-15 RX ADMIN — ASPIRIN 81 MG: 81 TABLET, COATED ORAL at 08:58

## 2021-01-15 RX ADMIN — FAMOTIDINE 20 MG: 20 TABLET, FILM COATED ORAL at 08:58

## 2021-01-15 RX ADMIN — GUAIFENESIN AND CODEINE PHOSPHATE 5 ML: 100; 10 SOLUTION ORAL at 05:54

## 2021-01-15 RX ADMIN — ARFORMOTEROL TARTRATE 15 MCG: 15 SOLUTION RESPIRATORY (INHALATION) at 20:54

## 2021-01-15 RX ADMIN — IPRATROPIUM BROMIDE AND ALBUTEROL SULFATE 3 ML: .5; 3 SOLUTION RESPIRATORY (INHALATION) at 11:42

## 2021-01-15 RX ADMIN — CEFEPIME HYDROCHLORIDE 2 G: 2 INJECTION, POWDER, FOR SOLUTION INTRAVENOUS at 08:58

## 2021-01-15 RX ADMIN — BUDESONIDE 1000 MCG: 0.5 SUSPENSION RESPIRATORY (INHALATION) at 20:54

## 2021-01-15 RX ADMIN — ALPRAZOLAM 0.25 MG: 0.5 TABLET ORAL at 20:42

## 2021-01-15 RX ADMIN — METHYLPREDNISOLONE SODIUM SUCCINATE 60 MG: 40 INJECTION, POWDER, FOR SOLUTION INTRAMUSCULAR; INTRAVENOUS at 11:57

## 2021-01-15 RX ADMIN — IPRATROPIUM BROMIDE AND ALBUTEROL SULFATE 3 ML: .5; 3 SOLUTION RESPIRATORY (INHALATION) at 07:49

## 2021-01-15 RX ADMIN — METHYLPREDNISOLONE SODIUM SUCCINATE 60 MG: 40 INJECTION, POWDER, FOR SOLUTION INTRAMUSCULAR; INTRAVENOUS at 07:11

## 2021-01-15 RX ADMIN — MONTELUKAST 10 MG: 10 TABLET, FILM COATED ORAL at 21:03

## 2021-01-15 RX ADMIN — GUAIFENESIN AND CODEINE PHOSPHATE 5 ML: 100; 10 SOLUTION ORAL at 14:13

## 2021-01-15 RX ADMIN — IPRATROPIUM BROMIDE AND ALBUTEROL SULFATE 3 ML: .5; 3 SOLUTION RESPIRATORY (INHALATION) at 20:54

## 2021-01-15 RX ADMIN — CEFEPIME HYDROCHLORIDE 2 G: 2 INJECTION, POWDER, FOR SOLUTION INTRAVENOUS at 20:44

## 2021-01-15 RX ADMIN — Medication 10 ML: at 21:05

## 2021-01-15 RX ADMIN — ALPRAZOLAM 0.25 MG: 0.5 TABLET ORAL at 13:10

## 2021-01-15 RX ADMIN — IPRATROPIUM BROMIDE AND ALBUTEROL SULFATE 3 ML: .5; 3 SOLUTION RESPIRATORY (INHALATION) at 03:00

## 2021-01-15 NOTE — PROGRESS NOTES
Problem: Patient Education: Go to Patient Education Activity  Goal: Patient/Family Education  Outcome: Progressing Towards Goal     Problem: Patient Education: Go to Patient Education Activity  Goal: Patient/Family Education  Outcome: Progressing Towards Goal     Problem: Falls - Risk of  Goal: *Absence of Falls  Description: Document Arlette Smith Fall Risk and appropriate interventions in the flowsheet.   Outcome: Progressing Towards Goal  Note: Fall Risk Interventions:  Mobility Interventions: Patient to call before getting OOB, Utilize walker, cane, or other assistive device         Medication Interventions: Patient to call before getting OOB, Teach patient to arise slowly         History of Falls Interventions: Door open when patient unattended, Room close to nurse's station         Problem: Patient Education: Go to Patient Education Activity  Goal: Patient/Family Education  Outcome: Progressing Towards Goal     Problem: Chronic Obstructive Pulmonary Disease (COPD)  Goal: *Oxygen saturation during activity within specified parameters  Outcome: Progressing Towards Goal  Goal: *Able to remain out of bed as prescribed  Outcome: Progressing Towards Goal  Goal: *Absence of hypoxia  Outcome: Progressing Towards Goal  Goal: *Optimize nutritional status  Outcome: Progressing Towards Goal     Problem: Patient Education: Go to Patient Education Activity  Goal: Patient/Family Education  Outcome: Progressing Towards Goal

## 2021-01-15 NOTE — PROGRESS NOTES
Physician Progress Note      PATIENTCopatricia Byrd  CSN #:                  [id number]  :                       1965  ADMIT DATE:       2021 4:04 PM  [address] [place] DATE:  RESPONDING  PROVIDER #:        Elaine Banks MD          QUERY TEXT:    Dear Hospitalist  Patient admitted with CHF and Possible PNA  . Documentation reflects PNA in notes ED MD PN  and MD PN  dated  and 21  . If possible, please document in the progress notes and discharge summary if PNA was: The medical record reflects the following:  Risk Factors: 63year old female presenting with dyspnea suspected COPD/asthma exacerbation triggered by viral infection and mild heart failure possibly contributing  ? Clinical Indicators:ED MD PN sob, leukocytosis and tachypnea MD PN on antibiotics for COPD exacerbation/possible pneumonia  pt continues to refuse neb tx , procal 0.19    Treatment: continue IV  cefepime and IV azithromycin for 5 days neb txs ,O2 and Labs monitored carefully    Thank you  Dirk Pacheco RN    Options provided:  -- PNA ruled out after study  -- PNA treated and resolved  -- PNA confirmed after study  -- Other - I will add my own diagnosis  -- Disagree - Not applicable / Not valid  -- Disagree - Clinically unable to determine / Unknown  -- Refer to Clinical Documentation Reviewer    PROVIDER RESPONSE TEXT:    PNA ruled out after study. Query created by:  Precious Kinsey on 1/15/2021 1:10 PM      Electronically signed by:  Elaine Banks MD 1/15/2021 1:28 PM

## 2021-01-15 NOTE — PROGRESS NOTES
Comprehensive Nutrition Assessment    Type and Reason for Visit: Initial, RD nutrition re-screen/LOS    Nutrition Recommendations/Plan:   - No nutrition intervention indicated at this time. Will re-screen as appropriate. Monitor po intake and diet tolerance    Nutrition Assessment:  Pt unavailable at time of visit. Per report, was upset today. Eats outside food; had a pizza earlier today. Poor intake of in-house meals per chart documentation. Tolerating diet. Malnutrition Assessment:  Malnutrition Status:  Insufficient data      Nutrition History and Allergies: Past medical hx: CHF, CKD stage III, cocaine abuse, COPD, depression, drug-seeking behavior, endocrine (thyroid) issues, HTN, Iron deficiency anemia, GERD without esophagitis, GI disorder, hypercholesterolemia, hypertensive heart failure, s/p hernia repair. Net wt loss of 118 lb, 43.4% x 5 years and 3 month PTA per chart hx. Pt was: 272 lb (10/2015), 200 lb (1/2018), 185 lb (11/2019), 170 lb (9/2020), 154 lb (1/2021) per chart hx. Per previous admission from December 2020, pt reported wt changes were mostly related to fluid status and little to due with body wt. No known food allergies     Estimated Daily Nutrient Needs:  Energy (kcal): 9113-3573; Weight Used for Energy Requirements: Admission(69.9 kg)  Protein (g): 70-91; Weight Used for Protein Requirements: Admission(x1-1.3)  Fluid (ml/day): 8037-6370; Method Used for Fluid Requirements: 1 ml/kcal      Nutrition Related Findings:  BM 1/11. no edema.    per previous admision, pt noted to be missing some teeth, but had tolerated regular consistency diet      Wounds:    None       Current Nutrition Therapies:  DIET CARDIAC Regular    Anthropometric Measures:  · Height:  4' 11\" (149.9 cm)  · Current Body Wt:  69.9 kg (154 lb 1.6 oz)   · Admission Body Wt:  154 lb 1.6 oz      · Ideal Body Wt:  95 lbs:  162.2 %   · Adjusted Body Weight:   ; Weight Adjustment for: No adjustment   · BMI Category:  Obese class 1 (BMI 30.0-34. 9)       Nutrition Diagnosis:   No nutrition diagnosis at this time      Nutrition Interventions:   Food and/or Nutrient Delivery: Continue current diet  Nutrition Education and Counseling: Education not indicated  Coordination of Nutrition Care: Continue to monitor while inpatient    Goals:  PO nutrition intake will meet >75% of patient's estimated nutrition needs within the next 7 days       Nutrition Monitoring and Evaluation:   Behavioral-Environmental Outcomes: None identified  Food/Nutrient Intake Outcomes: Food and nutrient intake  Physical Signs/Symptoms Outcomes: Biochemical data, Nutrition focused physical findings, Meal time behavior    Discharge Planning:    No discharge needs at this time     Electronically signed by Maya Iraheta, [address] [place] [address] on 1/15/2021 at 6:02 PM    Contact: [phone]

## 2021-01-15 NOTE — PROGRESS NOTES
ROSALINE Chapa from poison control called to inquire on patient's serum alcohol level. She said she close the case as of now. At [facility], patient refused to have Solumedrol IV administered. Patient states her PIV catheter insertion site is hurting. RN offered to insert a new one but she refused and started cussing and cursing at this RN.

## 2021-01-15 NOTE — MED STUDENT NOTES
*ATTENTION:  This note has been created by a medical student for educational purposes only. Please do not refer to the content of this note for clinical decision-making, billing, or other purposes. Please see attending physicians note to obtain clinical information on this patient. * Student Progress Note Please refer to attendings daily rounding note for full details Patient: Margie Saldivar MRN: [id number]  CSN: [id number] YOB: 1965  Age: 63 y.o. Sex: female DOA: 1/9/2021 LOS:  LOS: 6 days Chief Complaint:   
 
Subjective:  
Patient is still complaining of shortness of breath, a productive cough, and chest pain secondary to her cough. She is also displeased with her current Xanax regimen and would like to receive it more frequently than every 8 hours. Objective:  
  
Visit Vitals BP (!) 167/87 (BP 1 Location: Right arm, BP Patient Position: At rest) Pulse 86 Temp (!) 96 °F (35.6 °C) Resp 18 SpO2 98% Breastfeeding No  
 
 
 
Physical Exam: 
Gen: Patient is lying on her right in no acute distress. HEENT: Neck supple, no JVD. Anicteric sclera. CV: RRR, normal S1, S2. No murmurs, rubs or gallops. Resp:Coughing frequently productive of mucous that she is spitting into a cup. Diffuse wheezing auscultated. Abd: Soft, non-distended, normoactive bowel sounds. Extrem: No edema. Skin: Warm, dry. I have reviewed the patient's Labs and Radiology studies. Assessment:  
Acute COPD Exacerbation 2/2 viral PNA 
(+) Rhinovirus and Enterovirus Respiratory Panel  
  
Chronic Diastolic CHF Leukocytosis - resolved Hyperkalemia - resolved  
  
Acute Kidney Injury - improved Cr improved 0.94<1.29 < 1.35 
  
YM Chronic Prolonged QTC EKG 1/14: QTc 426 EKG 1/9: QTc 492 
  
Substance Abuse 1/13 UDS (+) opiates  
  
Tobacco Use HTN  
HLD Plan:  
Continue cardiac diet, limit sodium intake. Currently on 4L supplemental oxygen, titrate as able. Maintain O2>92%. Continue DUO-NEB q4h.   
Continue arformoterol BID. Continue Budesonide BID. Continue methylprednisolone IV q6h. Continue montelukast 10 mg tablet at bedtime. Continue cefepime 2g IV q12h. Continue clonidine HCL 0.2 mg tablet BID Robitussin AC q4h PRN. Pulmonary participating in treatment.  
Continue amlodipine 10 mg tablet for HTN. Continue lipitor 20 mg tablet for HLD. Continue Aspirin delayed-release 81 mg tablet. Continue heparin SQ injection q8h. Citalopram 20 mg tablet in the morning. Follow EKGs. Xanax 0.25 mg tablet q8h PRN. Continue to monitor BMP. PT/OT/OOB to ambulate around room. Encourage cessation of tobacco and substance use .  
 
 
Charli Williamson 1/15/2021 
10:56 AM 
*ATTENTION:  This note has been created by a medical student for educational purposes only. Please do not refer to the content of this note for clinical decision-making, billing, or other purposes. Please see attending physicians note to obtain clinical information on this patient. *

## 2021-01-15 NOTE — PROGRESS NOTES
Bedside shift change report given to Karl Rivera RN (oncoming nurse) by Octaviano Dahl RN (offgoing nurse). Report included the following information SBAR, Kardex and MAR.

## 2021-01-15 NOTE — ROUTINE PROCESS
Bedside and Verbal shift change report given to Yadira Mensah RN (oncoming nurse) by Christa Rios RN (offgoing nurse). Report included the following information SBAR, Kardex, MAR and Recent Results. SITUATION: 
Code Status: Full Code Reason for Admission: Acute exacerbation of chronic obstructive pulmonary disease (COPD) ([address] [J44.1] COPD exacerbation ([address] [J44.1] Hospital day: 6 Problem List:  
   
Hospital Problems  Date Reviewed: 10/23/2019 Codes Class Noted POA Acute exacerbation of chronic obstructive pulmonary disease (COPD) ([address] ICD-10-CM: J44.1 ICD-9-CM: 491.21  9/14/2020 Unknown COPD exacerbation ([address] ICD-10-CM: J44.1 ICD-9-CM: 491.21  9/7/2020 Unknown BACKGROUND: 
 Past Medical History:  
Past Medical History:  
Diagnosis Date  CHF (congestive heart failure) ([address]  Chronic respiratory failure with hypoxia ([address] 9/7/2020  CKD (chronic kidney disease) stage 3, GFR 30-59 ml/min 10/31/2019  Cocaine abuse ([address] 11/26/2017  COPD (chronic obstructive pulmonary disease) with chronic bronchitis ([address] 12/19/2017  Dependence on supplemental oxygen  Depression 3/22/2020  Drug-seeking behavior 11/19/2016  Endocrine disease   
 thyroid issues  Essential hypertension 3/10/2017  Fe deficiency anemia 10/27/2012  Fibromyalgia 12/16/2016  Gastroesophageal reflux disease without esophagitis 10/10/2016  Gastrointestinal disorder \"blockage in my stomach\"  Hypercholesterolemia  Hypertension  Hypertensive heart failure ([address] 12/19/2017  Morbid obesity due to excess calories ([address] 3/10/2017  
 NSTEMI (non-ST elevated myocardial infarction) ([address] 3/22/2020  On home O2 12/3/2015 Overview:  2L at home per pt for approx 8 years  Polysubstance abuse ([address] 9/15/2018  Respiratory failure ([address] 1/11/2017  S/P hernia repair 10/31/2019  Sleep apnea 12/19/2017  T wave inversion in EKG 3/9/2019  Tobacco use 9/28/2019  Uncomplicated severe persistent asthma 12/13/2016  Vocal cord disease 12/7/2016 Patient taking anticoagulants yes Patient has a defibrillator: no  
 History of shots YES for example, flu, pneumonia, tetanus Isolation History NO for example, MRSA, CDiff ASSESSMENT: 
Changes in Assessment Throughout Shift: NONE Significant Changes in 24 hours (for example, RR/code, fall) Patient has Central Line: no  
Patient has Walker Cath: no  
Mobility Issues PT 
IV Patency OR Checklist 
Pending Tests Last Vitals: 
Vitals w/ MEWS Score (last day) Date/Time MEWS Score Pulse Resp Temp BP Level of Consciousness SpO2  
 01/15/21 0507  1  79  20  97.6 °F (36.4 °C)  (!) 150/75  Alert  99 % 01/15/21 0118  1  71  20  97 °F (36.1 °C)  (!) 146/83  Alert  98 % 01/14/21 2023  1  67  20  97 °F (36.1 °C)  139/76  Alert  97 % 01/14/21 1739  1  83  20  97.3 °F (36.3 °C)  (!) 165/77  Alert  97 % 01/14/21 1459  1  93  20  97.6 °F (36.4 °C)  137/67  Alert  100 % 01/14/21 1327  1  86  20  97.4 °F (36.3 °C)  (!) 176/83  Alert  99 % 01/14/21 1103              94 % 01/14/21 0812  1  80  18  97.6 °F (36.4 °C)  (!) 149/71  Alert  98 % 01/14/21 163 [facility] [place] 1690 PAIN Pain Assessment Pain Intensity 1: 0 (01/15/21 0415) Pain Location 1: Abdomen, Chest 
  Pain Intervention(s) 1: (Patient refused tylenol) Patient Stated Pain Goal: 0 Intervention effective: N/A Time of last intervention: N/A Reassessment Completed: yes Other actions taken for pain: Distraction Last 3 Weights: There were no vitals filed for this visit. Weight change: INTAKE/OUPUT Current Shift: No intake/output data recorded. Last three shifts: 01/13 1901 - 01/15 0700 In: 1320 [P.O.:1320] Out: -  
 
RECOMMENDATIONS AND DISCHARGE PLANNING Patient needs and requests: None Pending tests/procedures: labs Discharge plan for patient: Home Discharge planning Needs or Barriers: None Estimated Discharge Date: 1/16/2021 Posted on Whiteboard in [facility] Room: yes \"HEALS\" SAFETY CHECK A safety check occurred in the patient's room between off going nurse and oncoming nurse listed above. The safety check included the below items: 
 
H High Alert Medications Verify all high alert medication drips (heparin, PCA, etc.) E Equipment Suction is set up for ALL patients (with rios) Red plugs utilized for all equipment (IV pumps, etc.) WOWs wiped down at end of shift. Room stocked with oxygen, suction, and other unit-specific supplies A Alarms Bed alarm is set for fall risk patients Ensure chair alarm is in place and activated if patient is up in a chair L Lines Check IV for any infiltration Walker bag is empty if patient has a Walker Tubing and IV bags are labeled Jay Curl Safety Room is clean, patient is clean, and equipment is clean. Hallways are clear from equipment besides carts. Fall bracelet on for fall risk patients Ensure room is clear and free of clutter Suction is set up for ALL patients (with rios) Hallways are clear from equipment besides carts. Isolation precautions followed, supplies available outside room, sign posted Lalit Weinstein RN

## 2021-01-15 NOTE — PROGRESS NOTES
[facility]  Pulmonary, Critical Care, and Sleep Medicine    Pulmonary Medicine Progress Note    Name: Zach Rowan MRN: [id number]  : 1965 Hospital: [facility]  Date: 1/15/2021       Subjective:  Pt seen after walking around the unit. Very short of breath. Poor air movement. Denies refusing her breathing treatments, but its documented she missed almost all of them yesterday.      Patient Active Problem List   Diagnosis Code    Abnormal CT of the chest R93.89    Asthma J45.909    Respiratory failure ([address] J96.90    Acute exacerbation of COPD with asthma ([address] J44.1, J45.901    Essential hypertension I10    Morbid obesity due to excess calories ([address] E66.01    Hypertensive heart failure ([address] I11.0    Sleep apnea G47.30    COPD (chronic obstructive pulmonary disease) with chronic bronchitis ([address] J44.9    T wave inversion in EKG R94.31    Tobacco use Z72.0    S/P hernia repair Z98.890, Z87.19    CKD (chronic kidney disease) stage 3, GFR 30-59 ml/min N18.30    Cocaine abuse ([address] F14.10    Drug-seeking behavior Z76.5    Fibromyalgia M79.7    Depression F32.9    Fe deficiency anemia D50.9    Gastroesophageal reflux disease without esophagitis K21.9    HLD (hyperlipidemia) E78.5    Thyroid goiter E04.9    MDRO (multiple drug resistant organisms) resistance [id number]    On home O2 Z99.81    Polysubstance abuse (HCC) B40.65    Uncomplicated severe persistent asthma J45.50    Vocal cord disease J38.3    COPD with acute exacerbation (HCC) J44.1    Chronic respiratory failure with hypoxia ([facility]) J96.11    Acute bronchitis J20.9    Suspected COVID-19 virus infection Z20.822    Tachycardia R00.0    Normocytic anemia D64.9    Acute respiratory distress R06.03    COPD exacerbation (HCC) J44.1    Acute exacerbation of chronic obstructive pulmonary disease (COPD) (HCC) J44.1    UTI (urinary tract infection) N39.0    Atypical chest pain R07.89    DEMARCUS (acute kidney injury) [facility] N17.9    Asthma exacerbation J45. 0    COPD (chronic obstructive pulmonary disease) ([facility]) J44.9    Acute bronchitis with chronic obstructive pulmonary disease (COPD) ([facility]) J44.0, J20.9    CHF (congestive heart failure) ([facility]) I50.9    Long QT interval R94.31       Assessment:  · COPD/Asthma Exacerbation - 2/2 Viral PNA. May also have bacterial component given elevated WBC with yellow productive sputum. On 4L home O2. Did follow with Edgardkavita Delia however admits she hasn't seen a pulmonologist in a while. COVID-19 (-) ([employer])  · (+) Rhinovirus and Enterovirus - Respiratory Panel (+)(01/10/21)  · (+) Nicotine Smoker - 1pk /1-2 wks  · Substance Abuse: reports taking methadone, denies other drug use: UDS + Cocaine and methadone 12/16/20  · R/O MY - States she has a PAP machine at home but it is full of mold and so she doesn't use it anymore --> needs a new device  · Hx of Vocal Cord Dysfunction Syndrome - Noted on [facility] [place], Previously treated by Speech Therapy   · Hx of Non-compliance  · Hx of Thyroid Disorder  · Anxiety with Grief response: Reports death of brother and mother 4 months ago and 31 y/o daughter about one year ago     Impression/Plan:  · SpO2 goal >90%-94; titrate supp O2 PRN; currently resting on NC  · Pt continuing to refuse nebulizers, I again reinforced the importance of them. · Con't duo-nebs q4  · BID Pulmicort and Brovana nebs  · Solu-Medrol 60mg q6. Would not taper until some improvement noted. · Will add guaifenasin/codeine for cough  · Con't Singulair  · Assess home Oxygen needs at discharge  · OT, PT, OOB and ambulate  · Will Follow    FiO2 to keep SpO2 >=92%, HOB >=30 degree, aspiration precautions, aggressive pulmonary toileting, incentive spirometry. Other issues management by primary team and respective consultants. Events and notes from last 24 hours reviewed.    Discussed with patient and family, answered all questions to their satisfaction. Care plan discussed with nursing.      Labs and images personally seen and available reports reviewed  All current medicines are reviewed       Medications- Current:  Current Facility-Administered Medications   Medication Dose Route Frequency    citalopram (CELEXA) tablet 20 mg  20 mg Oral DAILY    ALPRAZolam (XANAX) tablet 0.25 mg  0.25 mg Oral Q8H PRN    guaiFENesin-codeine (ROBITUSSIN AC) 100-10 mg/5 mL solution 5 mL  5 mL Oral Q4H PRN    cefepime (MAXIPIME) 2 g in sterile water (preservative free) 10 mL IV syringe  2 g IntraVENous Q12H    arformoteroL (BROVANA) neb solution 15 mcg  15 mcg Nebulization BID RT    budesonide (PULMICORT) 500 mcg/2 ml nebulizer suspension  1,000 mcg Nebulization BID RT    amLODIPine (NORVASC) tablet 10 mg  10 mg Oral DAILY    aspirin delayed-release tablet 81 mg  81 mg Oral DAILY    atorvastatin (LIPITOR) tablet 20 mg  20 mg Oral QHS    famotidine (PEPCID) tablet 20 mg  20 mg Oral DAILY    [Held by provider] lisinopriL (PRINIVIL, ZESTRIL) tablet 20 mg  20 mg Oral DAILY    montelukast (SINGULAIR) tablet 10 mg  10 mg Oral QHS    sodium chloride (NS) flush 5-40 mL  5-40 mL IntraVENous Q8H    sodium chloride (NS) flush 5-40 mL  5-40 mL IntraVENous PRN    acetaminophen (TYLENOL) tablet 650 mg  650 mg Oral Q6H PRN    Or    acetaminophen (TYLENOL) suppository 650 mg  650 mg Rectal Q6H PRN    polyethylene glycol (MIRALAX) packet 17 g  17 g Oral DAILY PRN    senna-docusate (PERICOLACE) 8.6-50 mg per tablet 1 Tab  1 Tab Oral BID    heparin (porcine) injection 5,000 Units  5,000 Units SubCUTAneous Q8H    methylPREDNISolone (PF) (SOLU-MEDROL) injection 60 mg  60 mg IntraVENous Q6H    sodium chloride (NS) flush 5-40 mL  5-40 mL IntraVENous Q8H    sodium chloride (NS) flush 5-40 mL  5-40 mL IntraVENous PRN    albuterol (ACCUNEB) nebulizer solution 2.5 mg  2.5 mg Inhalation Q4H PRN    nitroglycerin (NITROSTAT) tablet 0.4 mg  0.4 mg SubLINGual Q5MIN PRN    albuterol-ipratropium (DUO-NEB) 2.5 MG-0.5 MG/3 ML  3 mL Nebulization Q4H RT    cloNIDine HCL (CATAPRES) tablet 0.2 mg  0.2 mg Oral BID       Objective:  Vital Signs:    Visit Vitals  BP (!) 142/68 (BP 1 Location: Right arm, BP Patient Position: At rest)   Pulse 75   Temp 97 °F (36.1 °C)   Resp 20   LMP 2009   SpO2 100%   Breastfeeding No      O2 Device: Nasal cannula  O2 Flow Rate (L/min): 4 l/min  Temp (24hrs), Av.1 °F (36.2 °C), Min:96 °F (35.6 °C), Max:97.6 °F (36.4 °C)      Intake/Output:   Last shift:      01/15 07 - 01/15 190  In: 660 [P.O.:660]  Out: -   Last 3 shifts:  190 - 01/15 0700  In: 1320 [P.O.:1320]  Out: -     Intake/Output Summary (Last 24 hours) at 1/15/2021 1410  Last data filed at 1/15/2021 1318  Gross per 24 hour   Intake 1500 ml   Output    Net 1500 ml       Physical Exam:     General/Neurology: Alert, Awake  Head:   Normocephalic, without obvious abnormality  Eye:   PERRL, EOM intact  Oral:  Mucus membranes moist  Lung:   B/l air entry fair. Severe expiratory wheezing. Heart:   S1 S2 present  Abdomen:  Soft, non tender, BS+nt   Extremities:  No pedal edema.    Skin:   Dry, intact  Data:      Recent Results (from the past 24 hour(s))   EKG, 12 LEAD, SUBSEQUENT    Collection Time: 21  3:06 PM   Result Value Ref Range    Ventricular Rate 78 BPM    Atrial Rate 78 BPM    P-R Interval 130 ms    QRS Duration 86 ms    Q-T Interval 374 ms    QTC Calculation (Bezet) 426 ms    Calculated P Axis 60 degrees    Calculated R Axis 55 degrees    Calculated T Axis 51 degrees    Diagnosis       Normal sinus rhythm  Minimal voltage criteria for LVH, may be normal variant  Borderline ECG  When compared with ECG of 2021 17:14,  QT has shortened  Confirmed by Katherine Zarate MD, ----- ([id number]) on 2021 3:26:45 PM     CBC WITH AUTOMATED DIFF    Collection Time: 01/15/21  1:16 AM   Result Value Ref Range    WBC 12.7 4.6 - 13.2 K/uL    RBC 4.40 4.20 - 5.30 M/uL    HGB 12.7 12.0 - 16.0 g/dL    HCT 39.3 35.0 - 45.0 %    MCV 89.3 74.0 - 97.0 FL    MCH 28.9 24.0 - 34.0 PG    MCHC 32.3 31.0 - 37.0 g/dL    RDW 12.7 11.6 - 14.5 %    PLATELET 654 635 - 232 K/uL    MPV 10.7 9.2 - 11.8 FL    NEUTROPHILS 88 (H) 40 - 73 %    LYMPHOCYTES 9 (L) 21 - 52 %    MONOCYTES 3 3 - 10 %    EOSINOPHILS 0 0 - 5 %    BASOPHILS 0 0 - 2 %    ABS. NEUTROPHILS 11.0 (H) 1.8 - 8.0 K/UL    ABS. LYMPHOCYTES 1.2 0.9 - 3.6 K/UL    ABS. MONOCYTES 0.4 0.05 - 1.2 K/UL    ABS. EOSINOPHILS 0.0 0.0 - 0.4 K/UL    ABS. BASOPHILS 0.0 0.0 - 0.1 K/UL    DF AUTOMATED     METABOLIC PANEL, COMPREHENSIVE    Collection Time: 01/15/21  1:16 AM   Result Value Ref Range    Sodium 140 136 - 145 mmol/L    Potassium 5.2 3.5 - 5.5 mmol/L    Chloride 109 100 - 111 mmol/L    CO2 25 21 - 32 mmol/L    Anion gap 6 3.0 - 18 mmol/L    Glucose 122 (H) 74 - 99 mg/dL    BUN 27 (H) 7.0 - 18 MG/DL    Creatinine 0.81 0.6 - 1.3 MG/DL    BUN/Creatinine ratio 33 (H) 12 - 20      GFR est AA >60 >60 ml/min/1.73m2    GFR est non-AA >60 >60 ml/min/1.73m2    Calcium 7.9 (L) 8.5 - 10.1 MG/DL    Bilirubin, total 0.6 0.2 - 1.0 MG/DL    ALT (SGPT) 32 13 - 56 U/L    AST (SGOT) 24 10 - 38 U/L    Alk. phosphatase 72 45 - 117 U/L    Protein, total 5.7 (L) 6.4 - 8.2 g/dL    Albumin 2.2 (L) 3.4 - 5.0 g/dL    Globulin 3.5 2.0 - 4.0 g/dL    A-G Ratio 0.6 (L) 0.8 - 1.7           Chemistry   Recent Labs     01/15/21  0116 01/14/21  0306   * 124*    139   K 5.2 4.3    107   CO2 25 29   BUN 27* 34*   CREA 0.81 0.94   CA 7.9* 8.1*   AGAP 6 3   BUCR 33* 36*   AP 72 80   TP 5.7* 5.6*   ALB 2.2* 2.4*   GLOB 3.5 3.2   AGRAT 0.6* 0.8       CBC w/Diff   Recent Labs     01/15/21  0116 01/14/21  0306   WBC 12.7 12.3   RBC 4.40 4.33   HGB 12.7 12.2   HCT 39.3 38.5    289   GRANS 88* 91*   LYMPH 9* 6*   EOS 0 0       ABG No results for input(s): PHI, PHI, POC2, PCO2I, PO2, PO2I, HCO3, HCO3I, FIO2, FIO2I in the last 72 hours.     Micro    No results for input(s): SDES, CULT in the last 72 hours. No results for input(s): CULT in the last 72 hours. CT (Most Recent)   Results from Hospital Encounter encounter on 10/11/20   CTA CHEST W OR W WO CONT    Narrative CTA CHEST PULMONARY EMBOLISM PROTOCOL        INDICATION: Heart failure, chronic kidney disease, cocaine abuse, oxygen  dependence, hypertension, fibromyalgia, morbid obesity, STEMI with exertional  chest and flank pain, pleuritic. . Question pulmonary embolism. TECHNIQUE: Thin collimation axial images obtained through the level of the  pulmonary arteries with additional imaging through the chest following the  uneventful administration of 100 mL Isovue-370 nonionic intravenous contrast.   Images reconstructed into three dimensional coronal and sagittal projections for  complete evaluation of the tortuous and overlapping pulmonary vascular  structures and to reduce patient radiation dose. All CT scans at this facility are performed using dose optimization technique as  appropriate to a performed exam, to include automated exposure control,  adjustment of the mA and/or kV according to patient size (including appropriate  matching first site-specific examinations), or use of iterative reconstruction  technique. COMPARISON: 10/8/2020; 7/26/2020. FINDINGS:    No filling defects are appreciated within the main, left, right, lobar or  visualized segmental pulmonary arteries to suggest embolism. Main pulmonary  artery is 3.1 cm transverse diameter. Thyroid: Right lobe is 4 cm and the left is 3 cm. It is diffusely heterogeneous  and likely nodular. Pericardium/ Heart: Left atrium appears dilated. Aorta/ Vessels: No aneurysm or dissection. Lymph Nodes: Unremarkable. .    Lungs: Mild central bronchial wall thickening. Limited evaluation secondary to  respiratory motion. Mild regions of subsegmental atelectasis or scarring lung  bases. Pleura: No effusion.     Upper Abdomen: Left lateral hepatic cysts measure up to 1.5 cm. Left adrenal  thickening. Incompletely visualized at least 2.2 cm left upper pole renal cyst.    Bones/soft tissues: Mild anasarca. No acute bony findings. Impression IMPRESSION:  No evidence for pulmonary emboli. Mild bronchitis or central venous congestion. Main pulmonary artery is mildly enlarged as may be seen in pulmonary arterial  hypertension. Left atrium appears enlarged. Thyromegaly and multinodular goiter. Mild anasarca. XR (Most Recent). CXR reviewed by me and compared with previous CXR   Results from Hospital Encounter encounter on 01/09/21   XR CHEST PORT    Narrative Portable Frontal Chest.    CLINICAL HISTORY: Shortness of breath. TECHNIQUE: Single frontal view of the chest, obtained portably. COMPARISONS: 1/8/2020. FINDINGS:       Mild haziness in the left midlung field. Mild septal thickening right lung  base. . The cardiomediastinal silhouette is unremarkable. Pulmonary vascularity  is normal, however. There are no effusions. Impression IMPRESSION:    Likely mild edema. Pneumonitis less likely. Correlate clinically and consider  follow-up. See my orders for details     Total care time exclusive of procedures with complex decision making, coordination of care and counseling patient performed and > 50% time spent in face to face evaluation as mentioned above.     Nayeli Winston MD  Critical Care Medicine

## 2021-01-16 LAB
ALBUMIN SERPL-MCNC: 3 G/DL (ref 3.4–5)
ALBUMIN/GLOB SERPL: 0.9 {RATIO} (ref 0.8–1.7)
ALP SERPL-CCNC: 81 U/L (ref 45–117)
ALT SERPL-CCNC: 39 U/L (ref 13–56)
ANION GAP SERPL CALC-SCNC: 5 MMOL/L (ref 3–18)
AST SERPL-CCNC: 9 U/L (ref 10–38)
BILIRUB SERPL-MCNC: 0.7 MG/DL (ref 0.2–1)
BUN SERPL-MCNC: 37 MG/DL (ref 7–18)
BUN/CREAT SERPL: 28 (ref 12–20)
CALCIUM SERPL-MCNC: 8.2 MG/DL (ref 8.5–10.1)
CHLORIDE SERPL-SCNC: 105 MMOL/L (ref 100–111)
CK MB CFR SERPL CALC: 4.9 % (ref 0–4)
CK MB SERPL-MCNC: 2.6 NG/ML (ref 5–25)
CK SERPL-CCNC: 53 U/L (ref 26–192)
CO2 SERPL-SCNC: 26 MMOL/L (ref 21–32)
CREAT SERPL-MCNC: 1.34 MG/DL (ref 0.6–1.3)
GLOBULIN SER CALC-MCNC: 3.3 G/DL (ref 2–4)
GLUCOSE SERPL-MCNC: 145 MG/DL (ref 74–99)
POTASSIUM SERPL-SCNC: 5.2 MMOL/L (ref 3.5–5.5)
PROT SERPL-MCNC: 6.3 G/DL (ref 6.4–8.2)
SODIUM SERPL-SCNC: 136 MMOL/L (ref 136–145)
TROPONIN I SERPL-MCNC: <0.02 NG/ML (ref 0–0.04)

## 2021-01-16 PROCEDURE — 93005 ELECTROCARDIOGRAM TRACING: CPT

## 2021-01-16 PROCEDURE — 99232 SBSQ HOSP IP/OBS MODERATE 35: CPT | Performed by: INTERNAL MEDICINE

## 2021-01-16 PROCEDURE — 80358 DRUG SCREENING METHADONE: CPT

## 2021-01-16 PROCEDURE — 74011000250 HC RX REV CODE- 250: Performed by: PHYSICIAN ASSISTANT

## 2021-01-16 PROCEDURE — 80307 DRUG TEST PRSMV CHEM ANLYZR: CPT

## 2021-01-16 PROCEDURE — 80347 BENZODIAZEPINES 13 OR MORE: CPT

## 2021-01-16 PROCEDURE — 77010033678 HC OXYGEN DAILY

## 2021-01-16 PROCEDURE — 80361 OPIATES 1 OR MORE: CPT

## 2021-01-16 PROCEDURE — 74011250637 HC RX REV CODE- 250/637: Performed by: INTERNAL MEDICINE

## 2021-01-16 PROCEDURE — 74011250636 HC RX REV CODE- 250/636: Performed by: INTERNAL MEDICINE

## 2021-01-16 PROCEDURE — 80365 DRUG SCREENING OXYCODONE: CPT

## 2021-01-16 PROCEDURE — 36415 COLL VENOUS BLD VENIPUNCTURE: CPT

## 2021-01-16 PROCEDURE — 74011636637 HC RX REV CODE- 636/637: Performed by: HOSPITALIST

## 2021-01-16 PROCEDURE — 74011250637 HC RX REV CODE- 250/637: Performed by: HOSPITALIST

## 2021-01-16 PROCEDURE — 94760 N-INVAS EAR/PLS OXIMETRY 1: CPT

## 2021-01-16 PROCEDURE — 94668 MNPJ CHEST WALL SBSQ: CPT

## 2021-01-16 PROCEDURE — 94640 AIRWAY INHALATION TREATMENT: CPT

## 2021-01-16 PROCEDURE — 82553 CREATINE MB FRACTION: CPT

## 2021-01-16 PROCEDURE — 74011250636 HC RX REV CODE- 250/636: Performed by: HOSPITALIST

## 2021-01-16 PROCEDURE — 74011000250 HC RX REV CODE- 250: Performed by: INTERNAL MEDICINE

## 2021-01-16 PROCEDURE — 65660000000 HC RM CCU STEPDOWN

## 2021-01-16 PROCEDURE — 99232 SBSQ HOSP IP/OBS MODERATE 35: CPT | Performed by: HOSPITALIST

## 2021-01-16 PROCEDURE — 80053 COMPREHEN METABOLIC PANEL: CPT

## 2021-01-16 RX ORDER — PREDNISONE 20 MG/1
60 TABLET ORAL
Status: DISCONTINUED | OUTPATIENT
Start: 2021-01-16 | End: 2021-01-18 | Stop reason: HOSPADM

## 2021-01-16 RX ADMIN — IPRATROPIUM BROMIDE AND ALBUTEROL SULFATE 3 ML: .5; 3 SOLUTION RESPIRATORY (INHALATION) at 22:08

## 2021-01-16 RX ADMIN — CLONIDINE HYDROCHLORIDE 0.2 MG: 0.1 TABLET ORAL at 08:27

## 2021-01-16 RX ADMIN — FAMOTIDINE 20 MG: 20 TABLET, FILM COATED ORAL at 08:28

## 2021-01-16 RX ADMIN — GUAIFENESIN AND CODEINE PHOSPHATE 5 ML: 100; 10 SOLUTION ORAL at 13:24

## 2021-01-16 RX ADMIN — METHYLPREDNISOLONE SODIUM SUCCINATE 125 MG: 40 INJECTION, POWDER, FOR SOLUTION INTRAMUSCULAR; INTRAVENOUS at 13:24

## 2021-01-16 RX ADMIN — METHYLPREDNISOLONE SODIUM SUCCINATE 60 MG: 40 INJECTION, POWDER, FOR SOLUTION INTRAMUSCULAR; INTRAVENOUS at 12:02

## 2021-01-16 RX ADMIN — ALPRAZOLAM 0.25 MG: 0.5 TABLET ORAL at 23:04

## 2021-01-16 RX ADMIN — METHYLPREDNISOLONE SODIUM SUCCINATE 60 MG: 40 INJECTION, POWDER, FOR SOLUTION INTRAMUSCULAR; INTRAVENOUS at 06:06

## 2021-01-16 RX ADMIN — AMLODIPINE BESYLATE 10 MG: 10 TABLET ORAL at 09:32

## 2021-01-16 RX ADMIN — BUDESONIDE 500 MCG: 0.5 SUSPENSION RESPIRATORY (INHALATION) at 08:59

## 2021-01-16 RX ADMIN — PREDNISONE 60 MG: 20 TABLET ORAL at 16:24

## 2021-01-16 RX ADMIN — ASPIRIN 81 MG: 81 TABLET, COATED ORAL at 08:28

## 2021-01-16 RX ADMIN — ARFORMOTEROL TARTRATE 15 MCG: 15 SOLUTION RESPIRATORY (INHALATION) at 22:08

## 2021-01-16 RX ADMIN — METHYLPREDNISOLONE SODIUM SUCCINATE 60 MG: 40 INJECTION, POWDER, FOR SOLUTION INTRAMUSCULAR; INTRAVENOUS at 00:38

## 2021-01-16 RX ADMIN — CLONIDINE HYDROCHLORIDE 0.2 MG: 0.1 TABLET ORAL at 18:12

## 2021-01-16 RX ADMIN — Medication 10 ML: at 06:06

## 2021-01-16 RX ADMIN — MONTELUKAST 10 MG: 10 TABLET, FILM COATED ORAL at 23:04

## 2021-01-16 RX ADMIN — IPRATROPIUM BROMIDE AND ALBUTEROL SULFATE 3 ML: .5; 3 SOLUTION RESPIRATORY (INHALATION) at 08:59

## 2021-01-16 RX ADMIN — ALPRAZOLAM 0.25 MG: 0.5 TABLET ORAL at 12:43

## 2021-01-16 RX ADMIN — GUAIFENESIN AND CODEINE PHOSPHATE 5 ML: 100; 10 SOLUTION ORAL at 06:21

## 2021-01-16 RX ADMIN — BUDESONIDE 500 MCG: 0.5 SUSPENSION RESPIRATORY (INHALATION) at 22:08

## 2021-01-16 RX ADMIN — METHADONE HYDROCHLORIDE 45 MG: 10 TABLET ORAL at 15:27

## 2021-01-16 RX ADMIN — ALPRAZOLAM 0.25 MG: 0.5 TABLET ORAL at 04:13

## 2021-01-16 RX ADMIN — ARFORMOTEROL TARTRATE 15 MCG: 15 SOLUTION RESPIRATORY (INHALATION) at 08:59

## 2021-01-16 RX ADMIN — ATORVASTATIN CALCIUM 20 MG: 20 TABLET, FILM COATED ORAL at 23:04

## 2021-01-16 RX ADMIN — CITALOPRAM HYDROBROMIDE 20 MG: 20 TABLET ORAL at 08:27

## 2021-01-16 RX ADMIN — IPRATROPIUM BROMIDE AND ALBUTEROL SULFATE 3 ML: .5; 3 SOLUTION RESPIRATORY (INHALATION) at 15:37

## 2021-01-16 NOTE — PROGRESS NOTES
[facility] Hospitalist Group  Progress Note    Patient: Ganga Healy Age: 63 y.o. : 1965 MR#: [id number] SSN: [id number]  Date/Time: 2021     Subjective:     Pt seen & evaluated - angry again today   Mentions that she wants her psych meds & her Methadone         Assessment/Plan:     Acute COPD Exacerbation 2/2 viral PNA  (+) Rhinovirus and Enterovirus Respiratory Panel - Continue Prednisone , pt keeps ripping out her IV lines. Chronic Diastolic CHF  Leukocytosis - resolved  Hyperkalemia - resolved      Acute Kidney Injury - improved   Cr improved 0.94<1.29 < 1.35     MY     Chronic Prolonged QTC  EKG : QTc 426  EKG : QTc 492  Pt mentions she would like to resume her Methadone today , restarted her celexa yesterday. Will continue to monitor QTc interval.      Substance Abuse   UDS (+) opiates      Tobacco Use   HTN   HLD      Continue current Rx Plan , will follow     Case discussed with:  []Patient  []Family  []Nursing  []Case Management  DVT Prophylaxis:  []Lovenox  []Hep SQ  []SCDs  []Coumadin   []On Heparin gtt    Objective:   VS:   Visit Vitals  BP (!) 158/90 (BP 1 Location: Right arm, BP Patient Position: At rest)   Pulse 85   Temp 98.4 °F (36.9 °C)   Resp 18   Ht 4' 11\" (1.499 m)   LMP 2009   SpO2 97%   Breastfeeding No   BMI 31.10 kg/m²      Tmax/24hrs: Temp (24hrs), Av.9 °F (36.6 °C), Min:97 °F (36.1 °C), Max:99.2 °F (37.3 °C)  IOBRIEF    Intake/Output Summary (Last 24 hours) at 2021 1456  Last data filed at 2021 1445  Gross per 24 hour   Intake 480 ml   Output    Net 480 ml       General:  Alert, cooperative, no acute distress    HEENT: PERRLA, anicteric sclerae. Pulmonary: Decreased BS in bases with expiratory wheezing   Cardiovascular: Regular rate and Rhythm. GI:  Soft, Non distended, Non tender. + Bowel sounds. Extremities:  No edema, cyanosis, clubbing. No calf tenderness. Neurologic: Alert and oriented X 3.  No acute neuro deficits. Additional:    Medications:   Current Facility-Administered Medications   Medication Dose Route Frequency    methadone (DOLOPHINE) tablet 45 mg  45 mg Oral DAILY    predniSONE (DELTASONE) tablet 60 mg  60 mg Oral DAILY WITH BREAKFAST    citalopram (CELEXA) tablet 20 mg  20 mg Oral DAILY    ALPRAZolam (XANAX) tablet 0.25 mg  0.25 mg Oral Q8H PRN    guaiFENesin-codeine (ROBITUSSIN AC) 100-10 mg/5 mL solution 5 mL  5 mL Oral Q4H PRN    arformoteroL (BROVANA) neb solution 15 mcg  15 mcg Nebulization BID RT    budesonide (PULMICORT) 500 mcg/2 ml nebulizer suspension  1,000 mcg Nebulization BID RT    amLODIPine (NORVASC) tablet 10 mg  10 mg Oral DAILY    aspirin delayed-release tablet 81 mg  81 mg Oral DAILY    atorvastatin (LIPITOR) tablet 20 mg  20 mg Oral QHS    famotidine (PEPCID) tablet 20 mg  20 mg Oral DAILY    [Held by provider] lisinopriL (PRINIVIL, ZESTRIL) tablet 20 mg  20 mg Oral DAILY    montelukast (SINGULAIR) tablet 10 mg  10 mg Oral QHS    acetaminophen (TYLENOL) tablet 650 mg  650 mg Oral Q6H PRN    Or    acetaminophen (TYLENOL) suppository 650 mg  650 mg Rectal Q6H PRN    polyethylene glycol (MIRALAX) packet 17 g  17 g Oral DAILY PRN    heparin (porcine) injection 5,000 Units  5,000 Units SubCUTAneous Q8H    sodium chloride (NS) flush 5-40 mL  5-40 mL IntraVENous Q8H    sodium chloride (NS) flush 5-40 mL  5-40 mL IntraVENous PRN    albuterol (ACCUNEB) nebulizer solution 2.5 mg  2.5 mg Inhalation Q4H PRN    nitroglycerin (NITROSTAT) tablet 0.4 mg  0.4 mg SubLINGual Q5MIN PRN    albuterol-ipratropium (DUO-NEB) 2.5 MG-0.5 MG/3 ML  3 mL Nebulization Q4H RT    cloNIDine HCL (CATAPRES) tablet 0.2 mg  0.2 mg Oral BID       Imaging:   XR Results (most recent):  Results from Hospital Encounter encounter on 01/09/21   XR CHEST PORT    Narrative Portable Frontal Chest.    CLINICAL HISTORY: Shortness of breath.     TECHNIQUE: Single frontal view of the chest, obtained portably. COMPARISONS: 1/8/2020. FINDINGS:       Mild haziness in the left midlung field. Mild septal thickening right lung  base. . The cardiomediastinal silhouette is unremarkable. Pulmonary vascularity  is normal, however. There are no effusions. Impression IMPRESSION:    Likely mild edema. Pneumonitis less likely. Correlate clinically and consider  follow-up. CT Results (most recent):  Results from Hospital Encounter encounter on 10/11/20   CTA CHEST W OR W WO CONT    Narrative CTA CHEST PULMONARY EMBOLISM PROTOCOL        INDICATION: Heart failure, chronic kidney disease, cocaine abuse, oxygen  dependence, hypertension, fibromyalgia, morbid obesity, STEMI with exertional  chest and flank pain, pleuritic. . Question pulmonary embolism. TECHNIQUE: Thin collimation axial images obtained through the level of the  pulmonary arteries with additional imaging through the chest following the  uneventful administration of 100 mL Isovue-370 nonionic intravenous contrast.   Images reconstructed into three dimensional coronal and sagittal projections for  complete evaluation of the tortuous and overlapping pulmonary vascular  structures and to reduce patient radiation dose. All CT scans at this facility are performed using dose optimization technique as  appropriate to a performed exam, to include automated exposure control,  adjustment of the mA and/or kV according to patient size (including appropriate  matching first site-specific examinations), or use of iterative reconstruction  technique. COMPARISON: 10/8/2020; 7/26/2020. FINDINGS:    No filling defects are appreciated within the main, left, right, lobar or  visualized segmental pulmonary arteries to suggest embolism. Main pulmonary  artery is 3.1 cm transverse diameter. Thyroid: Right lobe is 4 cm and the left is 3 cm. It is diffusely heterogeneous  and likely nodular. Pericardium/ Heart: Left atrium appears dilated.   Aorta/ Vessels: No aneurysm or dissection. Lymph Nodes: Unremarkable. .    Lungs: Mild central bronchial wall thickening. Limited evaluation secondary to  respiratory motion. Mild regions of subsegmental atelectasis or scarring lung  bases. Pleura: No effusion. Upper Abdomen: Left lateral hepatic cysts measure up to 1.5 cm. Left adrenal  thickening. Incompletely visualized at least 2.2 cm left upper pole renal cyst.    Bones/soft tissues: Mild anasarca. No acute bony findings. Impression IMPRESSION:  No evidence for pulmonary emboli. Mild bronchitis or central venous congestion. Main pulmonary artery is mildly enlarged as may be seen in pulmonary arterial  hypertension. Left atrium appears enlarged. Thyromegaly and multinodular goiter. Mild anasarca. 10/19/20   ECHO ADULT FOLLOW-UP OR LIMITED 10/19/2020 10/19/2020    Narrative · LV: Estimated LVEF is 60 - 65%. Visually measured ejection fraction. Normal cavity size and systolic function (ejection fraction normal). Moderate concentric hypertrophy. Wall motion: normal.  · PA: Pulmonary arterial systolic pressure is 25 mmHg.         Signed by: Patrice Motta MD        Labs:    Recent Results (from the past 48 hour(s))   EKG, 12 LEAD, SUBSEQUENT    Collection Time: 01/14/21  3:06 PM   Result Value Ref Range    Ventricular Rate 78 BPM    Atrial Rate 78 BPM    P-R Interval 130 ms    QRS Duration 86 ms    Q-T Interval 374 ms    QTC Calculation (Bezet) 426 ms    Calculated P Axis 60 degrees    Calculated R Axis 55 degrees    Calculated T Axis 51 degrees    Diagnosis       Normal sinus rhythm  Minimal voltage criteria for LVH, may be normal variant  Borderline ECG  When compared with ECG of 09-JAN-2021 17:14,  QT has shortened  Confirmed by Lavon Godinez MD, ----- ([id number]) on 1/14/2021 3:26:45 PM     CBC WITH AUTOMATED DIFF    Collection Time: 01/15/21  1:16 AM   Result Value Ref Range    WBC 12.7 4.6 - 13.2 K/uL    RBC 4.40 4.20 - 5.30 M/uL    HGB 12.7 12.0 - 16.0 g/dL    HCT 39.3 35.0 - 45.0 %    MCV 89.3 74.0 - 97.0 FL    MCH 28.9 24.0 - 34.0 PG    MCHC 32.3 31.0 - 37.0 g/dL    RDW 12.7 11.6 - 14.5 %    PLATELET 824 116 - 871 K/uL    MPV 10.7 9.2 - 11.8 FL    NEUTROPHILS 88 (H) 40 - 73 %    LYMPHOCYTES 9 (L) 21 - 52 %    MONOCYTES 3 3 - 10 %    EOSINOPHILS 0 0 - 5 %    BASOPHILS 0 0 - 2 %    ABS. NEUTROPHILS 11.0 (H) 1.8 - 8.0 K/UL    ABS. LYMPHOCYTES 1.2 0.9 - 3.6 K/UL    ABS. MONOCYTES 0.4 0.05 - 1.2 K/UL    ABS. EOSINOPHILS 0.0 0.0 - 0.4 K/UL    ABS. BASOPHILS 0.0 0.0 - 0.1 K/UL    DF AUTOMATED     METABOLIC PANEL, COMPREHENSIVE    Collection Time: 01/15/21  1:16 AM   Result Value Ref Range    Sodium 140 136 - 145 mmol/L    Potassium 5.2 3.5 - 5.5 mmol/L    Chloride 109 100 - 111 mmol/L    CO2 25 21 - 32 mmol/L    Anion gap 6 3.0 - 18 mmol/L    Glucose 122 (H) 74 - 99 mg/dL    BUN 27 (H) 7.0 - 18 MG/DL    Creatinine 0.81 0.6 - 1.3 MG/DL    BUN/Creatinine ratio 33 (H) 12 - 20      GFR est AA >60 >60 ml/min/1.73m2    GFR est non-AA >60 >60 ml/min/1.73m2    Calcium 7.9 (L) 8.5 - 10.1 MG/DL    Bilirubin, total 0.6 0.2 - 1.0 MG/DL    ALT (SGPT) 32 13 - 56 U/L    AST (SGOT) 24 10 - 38 U/L    Alk.  phosphatase 72 45 - 117 U/L    Protein, total 5.7 (L) 6.4 - 8.2 g/dL    Albumin 2.2 (L) 3.4 - 5.0 g/dL    Globulin 3.5 2.0 - 4.0 g/dL    A-G Ratio 0.6 (L) 0.8 - 1.7     EKG, 12 LEAD, SUBSEQUENT    Collection Time: 01/15/21  3:36 PM   Result Value Ref Range    Ventricular Rate 84 BPM    Atrial Rate 84 BPM    P-R Interval 118 ms    QRS Duration 88 ms    Q-T Interval 370 ms    QTC Calculation (Bezet) 437 ms    Calculated P Axis 68 degrees    Calculated R Axis 58 degrees    Calculated T Axis 68 degrees    Diagnosis       Sinus rhythm with premature atrial complexes  Otherwise normal ECG  When compared with ECG of 14-JAN-2021 15:06,  premature atrial complexes are now present  Nonspecific T wave abnormality no longer evident in Inferior leads         Signed By: Fernando Bhat Charli Harrington MD     January 16, 2021      I spent 35 minutes with the patient in face-to-face consultation, of which greater than 50% was spent in counseling and coordination of care as described above    Disclaimer: Sections of this note are dictated using utilizing voice recognition software. Minor typographical errors may be present. If questions arise, please do not hesitate to contact me or call our department.

## 2021-01-16 NOTE — PROGRESS NOTES
Patient noted to have prolonged QTC on admission. Patient is on methadone and Celexa both of which can prolong her QTc interval.  Most recent EKG from this morning showed a QTc interval of 437 which has significantly improved since her admission. Patient was off her methadone and Celexa however I restarted her Celexa yesterday. Patient is complaining this morning of having diarrhea, stomach cramps and anxiety likely related to methadone withdrawal since she has been off her methadone from 1/9/2021.   I will restart patient's methadone 45 mg daily and continue to closely monitor her QTc interval.

## 2021-01-16 NOTE — PROGRESS NOTES
[facility]  Pulmonary, Critical Care, and Sleep Medicine    Pulmonary Medicine Progress Note    Name: Tracey Wren MRN: [id number]  : 1965 Hospital: [address] [place] [address] Dr  Date: 2021       Subjective:  Still with very poor air movement. Symptomatically not improving. Patient Active Problem List   Diagnosis Code    Abnormal CT of the chest R93.89    Asthma J45.909    Respiratory failure ([address] J96.90    Acute exacerbation of COPD with asthma ([address] J44.1, J45.901    Essential hypertension I10    Morbid obesity due to excess calories ([address] E66.01    Hypertensive heart failure ([address] I11.0    Sleep apnea G47.30    COPD (chronic obstructive pulmonary disease) with chronic bronchitis ([address] J44.9    T wave inversion in EKG R94.31    Tobacco use Z72.0    S/P hernia repair Z98.890, Z87.19    CKD (chronic kidney disease) stage 3, GFR 30-59 ml/min N18.30    Cocaine abuse ([address] F14.10    Drug-seeking behavior Z76.5    Fibromyalgia M79.7    Depression F32.9    Fe deficiency anemia D50.9    Gastroesophageal reflux disease without esophagitis K21.9    HLD (hyperlipidemia) E78.5    Thyroid goiter E04.9    MDRO (multiple drug resistant organisms) resistance QBA2620    On home O2 Z99.81    Polysubstance abuse ([facility]) Q29.70    Uncomplicated severe persistent asthma J45.50    Vocal cord disease J38.3    COPD with acute exacerbation ([facility]) J44.1    Chronic respiratory failure with hypoxia ([facility]) J96.11    Acute bronchitis J20.9    Suspected COVID-19 virus infection Z20.822    Tachycardia R00.0    Normocytic anemia D64.9    Acute respiratory distress R06.03    COPD exacerbation ([facility]) J44.1    Acute exacerbation of chronic obstructive pulmonary disease (COPD) ([facility]) J44.1    UTI (urinary tract infection) N39.0    Atypical chest pain R07.89    DEMARCUS (acute kidney injury) ([facility]) N17.9    Asthma exacerbation J45. 0    COPD (chronic obstructive pulmonary disease) ([facility] J44.9    Acute bronchitis with chronic obstructive pulmonary disease (COPD) ([facility]) J44.0, J20.9    CHF (congestive heart failure) ([facility]) I50.9    Long QT interval R94.31       Assessment:  · COPD/Asthma Exacerbation - 2/2 Viral PNA. May also have bacterial component given elevated WBC with yellow productive sputum. On 4L home O2. Did follow with Awais Azul however admits she hasn't seen a pulmonologist in a while. COVID-19 (-) ([employer])  · (+) Rhinovirus and Enterovirus - Respiratory Panel (+)(01/10/21)  · (+) Nicotine Smoker - 1pk /1-2 wks  · Substance Abuse: reports taking methadone, denies other drug use: UDS + Cocaine and methadone 12/16/20  · R/O MY - States she has a PAP machine at home but it is full of mold and so she doesn't use it anymore --> needs a new device  · Hx of Vocal Cord Dysfunction Syndrome - Noted on [facility] [place], Previously treated by Speech Therapy   · Hx of Non-compliance  · Hx of Thyroid Disorder  · Anxiety with Grief response: Reports death of brother and mother 4 months ago and 31 y/o daughter about one year ago     Impression/Plan:  · SpO2 goal >90%-94; titrate supp O2 PRN; currently resting on NC  · Pt continuing to refuse nebulizers, I again reinforced the importance of them. · Con't duo-nebs q4  · BID Pulmicort and Brovana nebs  · Solu-Medrol 60mg q6. Will give additional 125mg of solumedrol once  · Will add guaifenasin/codeine for cough  · Con't Singulair  · Assess home Oxygen needs at discharge  · OT, PT, OOB and ambulate  · Will Follow    FiO2 to keep SpO2 >=92%, HOB >=30 degree, aspiration precautions, aggressive pulmonary toileting, incentive spirometry. Other issues management by primary team and respective consultants. Events and notes from last 24 hours reviewed. Discussed with patient and family, answered all questions to their satisfaction. Care plan discussed with nursing.      Labs and images personally seen and available reports reviewed  All current medicines are reviewed       Medications- Current:  Current Facility-Administered Medications   Medication Dose Route Frequency    methadone (DOLOPHINE) tablet 45 mg  45 mg Oral DAILY    predniSONE (DELTASONE) tablet 60 mg  60 mg Oral DAILY WITH BREAKFAST    citalopram (CELEXA) tablet 20 mg  20 mg Oral DAILY    ALPRAZolam (XANAX) tablet 0.25 mg  0.25 mg Oral Q8H PRN    guaiFENesin-codeine (ROBITUSSIN AC) 100-10 mg/5 mL solution 5 mL  5 mL Oral Q4H PRN    arformoteroL (BROVANA) neb solution 15 mcg  15 mcg Nebulization BID RT    budesonide (PULMICORT) 500 mcg/2 ml nebulizer suspension  1,000 mcg Nebulization BID RT    amLODIPine (NORVASC) tablet 10 mg  10 mg Oral DAILY    aspirin delayed-release tablet 81 mg  81 mg Oral DAILY    atorvastatin (LIPITOR) tablet 20 mg  20 mg Oral QHS    famotidine (PEPCID) tablet 20 mg  20 mg Oral DAILY    [Held by provider] lisinopriL (PRINIVIL, ZESTRIL) tablet 20 mg  20 mg Oral DAILY    montelukast (SINGULAIR) tablet 10 mg  10 mg Oral QHS    acetaminophen (TYLENOL) tablet 650 mg  650 mg Oral Q6H PRN    Or    acetaminophen (TYLENOL) suppository 650 mg  650 mg Rectal Q6H PRN    polyethylene glycol (MIRALAX) packet 17 g  17 g Oral DAILY PRN    heparin (porcine) injection 5,000 Units  5,000 Units SubCUTAneous Q8H    sodium chloride (NS) flush 5-40 mL  5-40 mL IntraVENous Q8H    sodium chloride (NS) flush 5-40 mL  5-40 mL IntraVENous PRN    albuterol (ACCUNEB) nebulizer solution 2.5 mg  2.5 mg Inhalation Q4H PRN    nitroglycerin (NITROSTAT) tablet 0.4 mg  0.4 mg SubLINGual Q5MIN PRN    albuterol-ipratropium (DUO-NEB) 2.5 MG-0.5 MG/3 ML  3 mL Nebulization Q4H RT    cloNIDine HCL (CATAPRES) tablet 0.2 mg  0.2 mg Oral BID       Objective:  Vital Signs:    Visit Vitals  BP (!) 158/90 (BP 1 Location: Right arm, BP Patient Position: At rest)   Pulse 85   Temp 98.4 °F (36.9 °C)   Resp 18   Ht 4' 11\" (1.499 m)   LMP 04/14/2009   SpO2 97%   Breastfeeding No   BMI 31.10 kg/m²      O2 Device: Nasal cannula  O2 Flow Rate (L/min): 4 l/min  Temp (24hrs), Av.9 °F (36.6 °C), Min:97 °F (36.1 °C), Max:99.2 °F (37.3 °C)      Intake/Output:   Last shift:      No intake/output data recorded. Last 3 shifts:  1901 -  0700  In: 1740 [P.O.:1740]  Out: -     Intake/Output Summary (Last 24 hours) at 2021 1509  Last data filed at 2021 5706  Gross per 24 hour   Intake 480 ml   Output    Net 480 ml       Physical Exam:     General/Neurology: Alert, Awake  Head:   Normocephalic, without obvious abnormality  Eye:   PERRL, EOM intact  Oral:  Mucus membranes moist  Lung:   B/l air entry fair. Severe expiratory wheezing. Heart:   S1 S2 present  Abdomen:  Soft, non tender, BS+nt   Extremities:  No pedal edema.    Skin:   Dry, intact  Data:      Recent Results (from the past 24 hour(s))   EKG, 12 LEAD, SUBSEQUENT    Collection Time: 01/15/21  3:36 PM   Result Value Ref Range    Ventricular Rate 84 BPM    Atrial Rate 84 BPM    P-R Interval 118 ms    QRS Duration 88 ms    Q-T Interval 370 ms    QTC Calculation (Bezet) 437 ms    Calculated P Axis 68 degrees    Calculated R Axis 58 degrees    Calculated T Axis 68 degrees    Diagnosis       Sinus rhythm with premature atrial complexes  Otherwise normal ECG  When compared with ECG of 2021 15:06,  premature atrial complexes are now present  Nonspecific T wave abnormality no longer evident in Inferior leads           Chemistry   Recent Labs     01/15/21  0116 21  0306   * 124*    139   K 5.2 4.3    107   CO2 25 29   BUN 27* 34*   CREA 0.81 0.94   CA 7.9* 8.1*   AGAP 6 3   BUCR 33* 36*   AP 72 80   TP 5.7* 5.6*   ALB 2.2* 2.4*   GLOB 3.5 3.2   AGRAT 0.6* 0.8       CBC w/Diff   Recent Labs     01/15/21  0116 21  0306   WBC 12.7 12.3   RBC 4.40 4.33   HGB 12.7 12.2   HCT 39.3 38.5    289   GRANS 88* 91*   LYMPH 9* 6*   EOS 0 0       ABG No results for input(s): PHI, PHI, POC2, PCO2I, PO2, PO2I, HCO3, HCO3I, FIO2, FIO2I in the last 72 hours. Micro    No results for input(s): SDES, CULT in the last 72 hours. No results for input(s): CULT in the last 72 hours. CT (Most Recent)   Results from Hospital Encounter encounter on 10/11/20   CTA CHEST W OR W WO CONT    Narrative CTA CHEST PULMONARY EMBOLISM PROTOCOL        INDICATION: Heart failure, chronic kidney disease, cocaine abuse, oxygen  dependence, hypertension, fibromyalgia, morbid obesity, STEMI with exertional  chest and flank pain, pleuritic. . Question pulmonary embolism. TECHNIQUE: Thin collimation axial images obtained through the level of the  pulmonary arteries with additional imaging through the chest following the  uneventful administration of 100 mL Isovue-370 nonionic intravenous contrast.   Images reconstructed into three dimensional coronal and sagittal projections for  complete evaluation of the tortuous and overlapping pulmonary vascular  structures and to reduce patient radiation dose. All CT scans at this facility are performed using dose optimization technique as  appropriate to a performed exam, to include automated exposure control,  adjustment of the mA and/or kV according to patient size (including appropriate  matching first site-specific examinations), or use of iterative reconstruction  technique. COMPARISON: 10/8/2020; 7/26/2020. FINDINGS:    No filling defects are appreciated within the main, left, right, lobar or  visualized segmental pulmonary arteries to suggest embolism. Main pulmonary  artery is 3.1 cm transverse diameter. Thyroid: Right lobe is 4 cm and the left is 3 cm. It is diffusely heterogeneous  and likely nodular. Pericardium/ Heart: Left atrium appears dilated. Aorta/ Vessels: No aneurysm or dissection. Lymph Nodes: Unremarkable. .    Lungs: Mild central bronchial wall thickening. Limited evaluation secondary to  respiratory motion.  Mild regions of subsegmental atelectasis or scarring lung  bases. Pleura: No effusion. Upper Abdomen: Left lateral hepatic cysts measure up to 1.5 cm. Left adrenal  thickening. Incompletely visualized at least 2.2 cm left upper pole renal cyst.    Bones/soft tissues: Mild anasarca. No acute bony findings. Impression IMPRESSION:  No evidence for pulmonary emboli. Mild bronchitis or central venous congestion. Main pulmonary artery is mildly enlarged as may be seen in pulmonary arterial  hypertension. Left atrium appears enlarged. Thyromegaly and multinodular goiter. Mild anasarca. XR (Most Recent). CXR reviewed by me and compared with previous CXR   Results from Hospital Encounter encounter on 01/09/21   XR CHEST PORT    Narrative Portable Frontal Chest.    CLINICAL HISTORY: Shortness of breath. TECHNIQUE: Single frontal view of the chest, obtained portably. COMPARISONS: 1/8/2020. FINDINGS:       Mild haziness in the left midlung field. Mild septal thickening right lung  base. . The cardiomediastinal silhouette is unremarkable. Pulmonary vascularity  is normal, however. There are no effusions. Impression IMPRESSION:    Likely mild edema. Pneumonitis less likely. Correlate clinically and consider  follow-up. See my orders for details     Total care time exclusive of procedures with complex decision making, coordination of care and counseling patient performed and > 50% time spent in face to face evaluation as mentioned above.     Jade Hernandez MD  Critical Care Medicine

## 2021-01-16 NOTE — ROUTINE PROCESS
Bedside and Verbal shift change report given to ROSALINE Vera (oncoming nurse) by Reyes Pal, RN (offgoing nurse). Report included the following information SBAR, Kardex, MAR and Recent Results. SITUATION: 
Code Status: Full Code Reason for Admission: Acute exacerbation of chronic obstructive pulmonary disease (COPD) ([address] [J44.1] COPD exacerbation ([address] [J44.1] Hospital day: 7 Problem List:  
   
Hospital Problems  Date Reviewed: 10/23/2019 Codes Class Noted POA Acute exacerbation of chronic obstructive pulmonary disease (COPD) ([address] ICD-10-CM: J44.1 ICD-9-CM: 491.21  9/14/2020 Unknown COPD exacerbation ([address] ICD-10-CM: J44.1 ICD-9-CM: 491.21  9/7/2020 Unknown BACKGROUND: 
 Past Medical History:  
Past Medical History:  
Diagnosis Date  CHF (congestive heart failure) ([address]  Chronic respiratory failure with hypoxia ([address] 9/7/2020  CKD (chronic kidney disease) stage 3, GFR 30-59 ml/min 10/31/2019  Cocaine abuse ([address] 11/26/2017  COPD (chronic obstructive pulmonary disease) with chronic bronchitis ([address] 12/19/2017  Dependence on supplemental oxygen  Depression 3/22/2020  Drug-seeking behavior 11/19/2016  Endocrine disease   
 thyroid issues  Essential hypertension 3/10/2017  Fe deficiency anemia 10/27/2012  Fibromyalgia 12/16/2016  Gastroesophageal reflux disease without esophagitis 10/10/2016  Gastrointestinal disorder \"blockage in my stomach\"  Hypercholesterolemia  Hypertension  Hypertensive heart failure ([address] 12/19/2017  Morbid obesity due to excess calories ([address] 3/10/2017  
 NSTEMI (non-ST elevated myocardial infarction) ([address] 3/22/2020  On home O2 12/3/2015 Overview:  2L at home per pt for approx 8 years  Polysubstance abuse ([address] 9/15/2018  Respiratory failure ([address] 1/11/2017  S/P hernia repair 10/31/2019  Sleep apnea 12/19/2017  T wave inversion in EKG 3/9/2019  Tobacco use 9/28/2019  Uncomplicated severe persistent asthma 12/13/2016  Vocal cord disease 12/7/2016 Patient taking anticoagulants yes Patient has a defibrillator: no  
 History of shots YES for example, flu, pneumonia, tetanus Isolation History NO for example, MRSA, CDiff ASSESSMENT: 
Changes in Assessment Throughout Shift: NONE Significant Changes in 24 hours (for example, RR/code, fall) Patient has Central Line: no  
Patient has Walker Cath: no  
Mobility Issues PT 
IV Patency OR Checklist 
Pending Tests Last Vitals: 
Vitals w/ MEWS Score (last day) Date/Time MEWS Score Pulse Resp Temp BP Level of Consciousness SpO2  
 01/16/21 0729            Alert    
 01/16/21 0413  1  70  20  97.4 °F (36.3 °C)  (!) 162/72  Alert  96 % 01/16/21 0039  1  75  18  97.3 °F (36.3 °C)  (!) 152/66  Alert  99 % 01/15/21 2041  1  78  18  98.2 °F (36.8 °C)  (!) 147/67  Alert  100 % 01/15/21 1622  1  88  18  97 °F (36.1 °C)  (!) 156/82  Alert  97 % 01/15/21 1142              100 % 01/15/21 1129  1  75  20  97 °F (36.1 °C)  (!) 142/68  Alert  100 % 01/15/21 0903  1  86  18  (!) 96 °F (35.6 °C)  (!) 167/87  Alert  98 % 01/15/21 0750              97 % 01/15/21 0507  1  79  20  97.6 °F (36.4 °C)  (!) 150/75  Alert  99 % 01/15/21 0118  1  71  20  97 °F (36.1 °C)  (!) 146/83  Alert  98 % PAIN Pain Assessment Pain Intensity 1: 0 (01/16/21 0729) Pain Location 1: Abdomen, Chest 
  Pain Intervention(s) 1: (Patient refused tylenol) Patient Stated Pain Goal: 0 Intervention effective: N/A Time of last intervention: N/A Reassessment Completed: yes Other actions taken for pain: Distraction Last 3 Weights: There were no vitals filed for this visit. Weight change: INTAKE/OUPUT Current Shift: No intake/output data recorded. Last three shifts: 01/14 1901 - 01/16 0700 In: 5508 [P.O.:1740] Out: -  
 
RECOMMENDATIONS AND DISCHARGE PLANNING 
 Patient needs and requests: None Pending tests/procedures: labs Discharge plan for patient: Home Discharge planning Needs or Barriers: None Estimated Discharge Date: 1/16/2021 Posted on Whiteboard in [facility] Room: yes \"HEALS\" SAFETY CHECK A safety check occurred in the patient's room between off going nurse and oncoming nurse listed above. The safety check included the below items: 
 
H High Alert Medications Verify all high alert medication drips (heparin, PCA, etc.) E Equipment Suction is set up for ALL patients (with rios) Red plugs utilized for all equipment (IV pumps, etc.) WOWs wiped down at end of shift. Room stocked with oxygen, suction, and other unit-specific supplies A Alarms Bed alarm is set for fall risk patients Ensure chair alarm is in place and activated if patient is up in a chair L Lines Check IV for any infiltration Walker bag is empty if patient has a Walker Tubing and IV bags are labeled Kumarzo Isabel Safety Room is clean, patient is clean, and equipment is clean. Hallways are clear from equipment besides carts. Fall bracelet on for fall risk patients Ensure room is clear and free of clutter Suction is set up for ALL patients (with rios) Hallways are clear from equipment besides carts. Isolation precautions followed, supplies available outside room, sign posted Annel Nelson RN

## 2021-01-16 NOTE — PROGRESS NOTES
Problem: Patient Education: Go to Patient Education Activity  Goal: Patient/Family Education  Outcome: Progressing Towards Goal     Problem: Patient Education: Go to Patient Education Activity  Goal: Patient/Family Education  Outcome: Progressing Towards Goal     Problem: Falls - Risk of  Goal: *Absence of Falls  Description: Document Harshal Led Fall Risk and appropriate interventions in the flowsheet.   Outcome: Progressing Towards Goal  Note: Fall Risk Interventions:  Mobility Interventions: Bed/chair exit alarm, Patient to call before getting OOB         Medication Interventions: Bed/chair exit alarm, Patient to call before getting OOB, Teach patient to arise slowly         History of Falls Interventions: Bed/chair exit alarm         Problem: Patient Education: Go to Patient Education Activity  Goal: Patient/Family Education  Outcome: Progressing Towards Goal

## 2021-01-16 NOTE — PROGRESS NOTES
Bedside shift change report given to Cleo Escobar RN (oncoming nurse) by Kat Gomez RN (offgoing nurse). Report included the following information SBAR, Kardex and MAR.

## 2021-01-17 LAB
ATRIAL RATE: 228 BPM
ATRIAL RATE: 84 BPM
CALCULATED P AXIS, ECG09: 68 DEGREES
CALCULATED P AXIS, ECG09: 70 DEGREES
CALCULATED R AXIS, ECG10: 40 DEGREES
CALCULATED R AXIS, ECG10: 58 DEGREES
CALCULATED T AXIS, ECG11: 68 DEGREES
CALCULATED T AXIS, ECG11: 77 DEGREES
DIAGNOSIS, 93000: NORMAL
DIAGNOSIS, 93000: NORMAL
P-R INTERVAL, ECG05: 118 MS
Q-T INTERVAL, ECG07: 320 MS
Q-T INTERVAL, ECG07: 370 MS
QRS DURATION, ECG06: 80 MS
QRS DURATION, ECG06: 88 MS
QTC CALCULATION (BEZET), ECG08: 437 MS
QTC CALCULATION (BEZET), ECG08: 441 MS
VENTRICULAR RATE, ECG03: 114 BPM
VENTRICULAR RATE, ECG03: 84 BPM

## 2021-01-17 PROCEDURE — 94640 AIRWAY INHALATION TREATMENT: CPT

## 2021-01-17 PROCEDURE — 74011250637 HC RX REV CODE- 250/637: Performed by: INTERNAL MEDICINE

## 2021-01-17 PROCEDURE — 99232 SBSQ HOSP IP/OBS MODERATE 35: CPT | Performed by: HOSPITALIST

## 2021-01-17 PROCEDURE — 74011000250 HC RX REV CODE- 250: Performed by: PHYSICIAN ASSISTANT

## 2021-01-17 PROCEDURE — 99232 SBSQ HOSP IP/OBS MODERATE 35: CPT | Performed by: INTERNAL MEDICINE

## 2021-01-17 PROCEDURE — 74011636637 HC RX REV CODE- 636/637: Performed by: HOSPITALIST

## 2021-01-17 PROCEDURE — 74011250637 HC RX REV CODE- 250/637: Performed by: HOSPITALIST

## 2021-01-17 PROCEDURE — 74011000250 HC RX REV CODE- 250: Performed by: INTERNAL MEDICINE

## 2021-01-17 PROCEDURE — 65660000000 HC RM CCU STEPDOWN

## 2021-01-17 RX ORDER — IPRATROPIUM BROMIDE AND ALBUTEROL SULFATE 2.5; .5 MG/3ML; MG/3ML
3 SOLUTION RESPIRATORY (INHALATION)
Status: DISCONTINUED | OUTPATIENT
Start: 2021-01-17 | End: 2021-01-18 | Stop reason: HOSPADM

## 2021-01-17 RX ORDER — METHADONE HYDROCHLORIDE 10 MG/1
10 TABLET ORAL DAILY
Status: DISCONTINUED | OUTPATIENT
Start: 2021-01-18 | End: 2021-01-17

## 2021-01-17 RX ADMIN — BUDESONIDE 1000 MCG: 0.5 SUSPENSION RESPIRATORY (INHALATION) at 08:00

## 2021-01-17 RX ADMIN — CLONIDINE HYDROCHLORIDE 0.2 MG: 0.1 TABLET ORAL at 09:38

## 2021-01-17 RX ADMIN — METHADONE HYDROCHLORIDE 45 MG: 10 TABLET ORAL at 08:25

## 2021-01-17 RX ADMIN — FAMOTIDINE 20 MG: 20 TABLET, FILM COATED ORAL at 08:25

## 2021-01-17 RX ADMIN — Medication 10 ML: at 21:52

## 2021-01-17 RX ADMIN — ARFORMOTEROL TARTRATE 15 MCG: 15 SOLUTION RESPIRATORY (INHALATION) at 08:00

## 2021-01-17 RX ADMIN — ATORVASTATIN CALCIUM 20 MG: 20 TABLET, FILM COATED ORAL at 21:52

## 2021-01-17 RX ADMIN — NITROGLYCERIN 0.4 MG: 0.4 TABLET SUBLINGUAL at 06:11

## 2021-01-17 RX ADMIN — GUAIFENESIN AND CODEINE PHOSPHATE 5 ML: 100; 10 SOLUTION ORAL at 05:19

## 2021-01-17 RX ADMIN — ASPIRIN 81 MG: 81 TABLET, COATED ORAL at 08:25

## 2021-01-17 RX ADMIN — PREDNISONE 60 MG: 20 TABLET ORAL at 08:26

## 2021-01-17 RX ADMIN — IPRATROPIUM BROMIDE AND ALBUTEROL SULFATE 3 ML: .5; 3 SOLUTION RESPIRATORY (INHALATION) at 13:33

## 2021-01-17 RX ADMIN — CLONIDINE HYDROCHLORIDE 0.2 MG: 0.1 TABLET ORAL at 20:26

## 2021-01-17 RX ADMIN — ALPRAZOLAM 0.25 MG: 0.5 TABLET ORAL at 06:09

## 2021-01-17 RX ADMIN — MONTELUKAST 10 MG: 10 TABLET, FILM COATED ORAL at 21:52

## 2021-01-17 RX ADMIN — IPRATROPIUM BROMIDE AND ALBUTEROL SULFATE 3 ML: .5; 3 SOLUTION RESPIRATORY (INHALATION) at 05:50

## 2021-01-17 RX ADMIN — CITALOPRAM HYDROBROMIDE 20 MG: 20 TABLET ORAL at 08:25

## 2021-01-17 RX ADMIN — IPRATROPIUM BROMIDE AND ALBUTEROL SULFATE 3 ML: .5; 3 SOLUTION RESPIRATORY (INHALATION) at 00:01

## 2021-01-17 RX ADMIN — AMLODIPINE BESYLATE 10 MG: 10 TABLET ORAL at 09:38

## 2021-01-17 NOTE — PROGRESS NOTES
[facility]  Pulmonary, Critical Care, and Sleep Medicine    Pulmonary Medicine Progress Note    Name: Yolande Colmenares MRN: [id number]  : 1965 Hospital: [address] [place] [address] Dr  Date: 2021       Subjective:  Improved air movement and wheezing. Patient Active Problem List   Diagnosis Code    Abnormal CT of the chest R93.89    Asthma J45.909    Respiratory failure ([address] J96.90    Acute exacerbation of COPD with asthma ([address] J44.1, J45.901    Essential hypertension I10    Morbid obesity due to excess calories ([address] E66.01    Hypertensive heart failure ([address] I11.0    Sleep apnea G47.30    COPD (chronic obstructive pulmonary disease) with chronic bronchitis ([address] J44.9    T wave inversion in EKG R94.31    Tobacco use Z72.0    S/P hernia repair Z98.890, Z87.19    CKD (chronic kidney disease) stage 3, GFR 30-59 ml/min N18.30    Cocaine abuse ([address] F14.10    Drug-seeking behavior Z76.5    Fibromyalgia M79.7    Depression F32.9    Fe deficiency anemia D50.9    Gastroesophageal reflux disease without esophagitis K21.9    HLD (hyperlipidemia) E78.5    Thyroid goiter E04.9    MDRO (multiple drug resistant organisms) resistance [id number]    On home O2 Z99.81    Polysubstance abuse ([facility]) G63.11    Uncomplicated severe persistent asthma J45.50    Vocal cord disease J38.3    COPD with acute exacerbation ([facility]) J44.1    Chronic respiratory failure with hypoxia ([facility]) J96.11    Acute bronchitis J20.9    Suspected COVID-19 virus infection Z20.822    Tachycardia R00.0    Normocytic anemia D64.9    Acute respiratory distress R06.03    COPD exacerbation ([facility]) J44.1    Acute exacerbation of chronic obstructive pulmonary disease (COPD) ([facility]) J44.1    UTI (urinary tract infection) N39.0    Atypical chest pain R07.89    DEMARCUS (acute kidney injury) ([facility]) N17.9    Asthma exacerbation J45. 0    COPD (chronic obstructive pulmonary disease) ([facility]) J44.9    Acute bronchitis with chronic obstructive pulmonary disease (COPD) ([facility]) J44.0, J20.9    CHF (congestive heart failure) ([facility]) I50.9    Long QT interval R94.31       Assessment:  · COPD/Asthma Exacerbation - 2/2 Viral PNA. May also have bacterial component given elevated WBC with yellow productive sputum. On 4L home O2. Did follow with Althea Lee however admits she hasn't seen a pulmonologist in a while. COVID-19 (-) ([facility])  · (+) Rhinovirus and Enterovirus - Respiratory Panel (+)(01/10/21)  · (+) Nicotine Smoker - 1pk /1-2 wks  · Substance Abuse: reports taking methadone, denies other drug use: UDS + Cocaine and methadone 12/16/20  · R/O MY - States she has a PAP machine at home but it is full of mold and so she doesn't use it anymore --> needs a new device  · Hx of Vocal Cord Dysfunction Syndrome - Noted on [facility] [place], Previously treated by Speech Therapy   · Hx of Non-compliance  · Hx of Thyroid Disorder  · Anxiety with Grief response: Reports death of brother and mother 4 months ago and 31 y/o daughter about one year ago     Impression/Plan:  · SpO2 goal >90%-94; titrate supp O2 PRN; currently resting on NC  · Con't duo-nebs q4  · BID Pulmicort and Brovana nebs  · Weaned to prednisone 60mg qd  · Will add guaifenasin/codeine for cough  · Con't Singulair  · Assess home Oxygen needs at discharge  · OT, PT, OOB and ambulate  · Will Follow    FiO2 to keep SpO2 >=92%, HOB >=30 degree, aspiration precautions, aggressive pulmonary toileting, incentive spirometry. Other issues management by primary team and respective consultants. Events and notes from last 24 hours reviewed. Discussed with patient and family, answered all questions to their satisfaction. Care plan discussed with nursing.      Labs and images personally seen and available reports reviewed  All current medicines are reviewed       Medications- Current:  Current Facility-Administered Medications   Medication Dose Route Frequency    albuterol-ipratropium (DUO-NEB) 2.5 MG-0.5 MG/3 ML  3 mL Nebulization Q6H RT    predniSONE (DELTASONE) tablet 60 mg  60 mg Oral DAILY WITH BREAKFAST    citalopram (CELEXA) tablet 20 mg  20 mg Oral DAILY    guaiFENesin-codeine (ROBITUSSIN AC) 100-10 mg/5 mL solution 5 mL  5 mL Oral Q4H PRN    arformoteroL (BROVANA) neb solution 15 mcg  15 mcg Nebulization BID RT    budesonide (PULMICORT) 500 mcg/2 ml nebulizer suspension  1,000 mcg Nebulization BID RT    amLODIPine (NORVASC) tablet 10 mg  10 mg Oral DAILY    aspirin delayed-release tablet 81 mg  81 mg Oral DAILY    atorvastatin (LIPITOR) tablet 20 mg  20 mg Oral QHS    famotidine (PEPCID) tablet 20 mg  20 mg Oral DAILY    [Held by provider] lisinopriL (PRINIVIL, ZESTRIL) tablet 20 mg  20 mg Oral DAILY    montelukast (SINGULAIR) tablet 10 mg  10 mg Oral QHS    acetaminophen (TYLENOL) tablet 650 mg  650 mg Oral Q6H PRN    Or    acetaminophen (TYLENOL) suppository 650 mg  650 mg Rectal Q6H PRN    polyethylene glycol (MIRALAX) packet 17 g  17 g Oral DAILY PRN    heparin (porcine) injection 5,000 Units  5,000 Units SubCUTAneous Q8H    sodium chloride (NS) flush 5-40 mL  5-40 mL IntraVENous Q8H    sodium chloride (NS) flush 5-40 mL  5-40 mL IntraVENous PRN    albuterol (ACCUNEB) nebulizer solution 2.5 mg  2.5 mg Inhalation Q4H PRN    nitroglycerin (NITROSTAT) tablet 0.4 mg  0.4 mg SubLINGual Q5MIN PRN    cloNIDine HCL (CATAPRES) tablet 0.2 mg  0.2 mg Oral BID       Objective:  Vital Signs:    Visit Vitals  BP (!) 189/94 (BP 1 Location: Right arm, BP Patient Position: At rest)   Pulse 81   Temp 98.7 °F (37.1 °C)   Resp 20   Ht 4' 11\" (1.499 m)   LMP 2009   SpO2 96%   Breastfeeding No   BMI 31.10 kg/m²      O2 Device: Room air  O2 Flow Rate (L/min): 3 l/min  Temp (24hrs), Av.1 °F (36.7 °C), Min:97 °F (36.1 °C), Max:98.7 °F (37.1 °C)      Intake/Output:   Last shift:      No intake/output data recorded.   Last 3 shifts: 01/15 1901 -  0700  In: 480 [P.O.:480]  Out: -   No intake or output data in the 24 hours ending 01/17/21 3038    Physical Exam:     General/Neurology: Alert, Awake  Head:   Normocephalic, without obvious abnormality  Eye:   PERRL, EOM intact  Oral:  Mucus membranes moist  Lung:   B/l air entry fair. Severe expiratory wheezing. Heart:   S1 S2 present  Abdomen:  Soft, non tender, BS+nt   Extremities:  No pedal edema. Skin:   Dry, intact  Data:      Recent Results (from the past 24 hour(s))   EKG, 12 LEAD, INITIAL    Collection Time: 01/16/21  7:43 PM   Result Value Ref Range    Ventricular Rate 114 BPM    Atrial Rate 228 BPM    QRS Duration 80 ms    Q-T Interval 320 ms    QTC Calculation (Bezet) 441 ms    Calculated P Axis 70 degrees    Calculated R Axis 40 degrees    Calculated T Axis 77 degrees    Diagnosis       Normal sinus rhythm  Anterior infarct , age undetermined  Abnormal ECG    Confirmed by Terrie Boyd ([id number]) on 1/17/2021 07:69:22 AM     METABOLIC PANEL, COMPREHENSIVE    Collection Time: 01/16/21  7:44 PM   Result Value Ref Range    Sodium 136 136 - 145 mmol/L    Potassium 5.2 3.5 - 5.5 mmol/L    Chloride 105 100 - 111 mmol/L    CO2 26 21 - 32 mmol/L    Anion gap 5 3.0 - 18 mmol/L    Glucose 145 (H) 74 - 99 mg/dL    BUN 37 (H) 7.0 - 18 MG/DL    Creatinine 1.34 (H) 0.6 - 1.3 MG/DL    BUN/Creatinine ratio 28 (H) 12 - 20      GFR est AA 50 (L) >60 ml/min/1.73m2    GFR est non-AA 41 (L) >60 ml/min/1.73m2    Calcium 8.2 (L) 8.5 - 10.1 MG/DL    Bilirubin, total 0.7 0.2 - 1.0 MG/DL    ALT (SGPT) 39 13 - 56 U/L    AST (SGOT) 9 (L) 10 - 38 U/L    Alk.  phosphatase 81 45 - 117 U/L    Protein, total 6.3 (L) 6.4 - 8.2 g/dL    Albumin 3.0 (L) 3.4 - 5.0 g/dL    Globulin 3.3 2.0 - 4.0 g/dL    A-G Ratio 0.9 0.8 - 1.7     CARDIAC PANEL,(CK, CKMB & TROPONIN)    Collection Time: 01/16/21  7:44 PM   Result Value Ref Range    CK - MB 2.6 <3.6 ng/ml    CK-MB Index 4.9 (H) 0.0 - 4.0 %    CK 53 26 - 192 U/L    Troponin-I, QT <0.02 0.0 - 0.045 NG/ML         Chemistry   Recent Labs     01/16/21  1944 01/15/21  0116   * 122*    140   K 5.2 5.2    109   CO2 26 25   BUN 37* 27*   CREA 1.34* 0.81   CA 8.2* 7.9*   AGAP 5 6   BUCR 28* 33*   AP 81 72   TP 6.3* 5.7*   ALB 3.0* 2.2*   GLOB 3.3 3.5   AGRAT 0.9 0.6*       CBC w/Diff   Recent Labs     01/15/21  0116   WBC 12.7   RBC 4.40   HGB 12.7   HCT 39.3      GRANS 88*   LYMPH 9*   EOS 0       ABG No results for input(s): PHI, PHI, POC2, PCO2I, PO2, PO2I, HCO3, HCO3I, FIO2, FIO2I in the last 72 hours. Micro    No results for input(s): SDES, CULT in the last 72 hours. No results for input(s): CULT in the last 72 hours. CT (Most Recent)   Results from Hospital Encounter encounter on 10/11/20   CTA CHEST W OR W WO CONT    Narrative CTA CHEST PULMONARY EMBOLISM PROTOCOL        INDICATION: Heart failure, chronic kidney disease, cocaine abuse, oxygen  dependence, hypertension, fibromyalgia, morbid obesity, STEMI with exertional  chest and flank pain, pleuritic. . Question pulmonary embolism. TECHNIQUE: Thin collimation axial images obtained through the level of the  pulmonary arteries with additional imaging through the chest following the  uneventful administration of 100 mL Isovue-370 nonionic intravenous contrast.   Images reconstructed into three dimensional coronal and sagittal projections for  complete evaluation of the tortuous and overlapping pulmonary vascular  structures and to reduce patient radiation dose. All CT scans at this facility are performed using dose optimization technique as  appropriate to a performed exam, to include automated exposure control,  adjustment of the mA and/or kV according to patient size (including appropriate  matching first site-specific examinations), or use of iterative reconstruction  technique. COMPARISON: 10/8/2020; 7/26/2020.     FINDINGS:    No filling defects are appreciated within the main, left, right, lobar or  visualized segmental pulmonary arteries to suggest embolism. Main pulmonary  artery is 3.1 cm transverse diameter. Thyroid: Right lobe is 4 cm and the left is 3 cm. It is diffusely heterogeneous  and likely nodular. Pericardium/ Heart: Left atrium appears dilated. Aorta/ Vessels: No aneurysm or dissection. Lymph Nodes: Unremarkable. .    Lungs: Mild central bronchial wall thickening. Limited evaluation secondary to  respiratory motion. Mild regions of subsegmental atelectasis or scarring lung  bases. Pleura: No effusion. Upper Abdomen: Left lateral hepatic cysts measure up to 1.5 cm. Left adrenal  thickening. Incompletely visualized at least 2.2 cm left upper pole renal cyst.    Bones/soft tissues: Mild anasarca. No acute bony findings. Impression IMPRESSION:  No evidence for pulmonary emboli. Mild bronchitis or central venous congestion. Main pulmonary artery is mildly enlarged as may be seen in pulmonary arterial  hypertension. Left atrium appears enlarged. Thyromegaly and multinodular goiter. Mild anasarca. XR (Most Recent). CXR reviewed by me and compared with previous CXR   Results from Hospital Encounter encounter on 01/09/21   XR CHEST PORT    Narrative Portable Frontal Chest.    CLINICAL HISTORY: Shortness of breath. TECHNIQUE: Single frontal view of the chest, obtained portably. COMPARISONS: 1/8/2020. FINDINGS:       Mild haziness in the left midlung field. Mild septal thickening right lung  base. . The cardiomediastinal silhouette is unremarkable. Pulmonary vascularity  is normal, however. There are no effusions. Impression IMPRESSION:    Likely mild edema. Pneumonitis less likely. Correlate clinically and consider  follow-up.          See my orders for details     Total care time exclusive of procedures with complex decision making, coordination of care and counseling patient performed and > 50% time spent in face to face evaluation as mentioned above.     Alejandra Keating MD  Critical Care Medicine

## 2021-01-17 NOTE — PROGRESS NOTES
Senior Resident Rapid Response Note  [facility]    Patient: Ananya Khan 63 y.o. female  [id number]  1965      Admit Date: 1/9/2021   Chief Complaint: <principal problem not specified>    RAPID RESPONSE     Rapid Response Called for odd behavior. Per nursing, patient walked off the floor today and came back acting strangely. She was worried patient may have gotten drugs in some way while she was not supervised. Apparently patient walked downstairs and came back Jolicloud" a few days ago. Writer does not know patient's baseline. However, she was conversational and able to move about and from bed with ease. She seemed to be in no apparent distress and was not altered to writer. She appeared mildly anxious. Writer could not identify any emergent need for evaluation. Patient's vital signs unchanged from daily trend. Patient without significant concerns except for cough which seems to be chronic. She is not complaining of chest pain, nausea/vomiting. EKG ordered in the unlikely event that patient did consume substances while unsupervised. EKG massively consumed with artifact as patient would not be still. Nothing of concern noted.      Medications Reviewed    OBJECTIVE     Patient Vitals for the past 24 hrs:   Temp Pulse Resp BP SpO2   01/16/21 1925 98.7 °F (37.1 °C) 88 19 (!) 163/83 96 %   01/16/21 1656 97 °F (36.1 °C) 78 18 (!) 162/80 93 %   01/16/21 1538     97 %   01/16/21 1200 98.4 °F (36.9 °C) 85 18 (!) 158/90 97 %   01/16/21 0901     98 %   01/16/21 0800 99.2 °F (37.3 °C) 82 18 (!) 164/85 97 %   01/16/21 0413 97.4 °F (36.3 °C) 70 20 (!) 162/72 96 %   01/16/21 0039 97.3 °F (36.3 °C) 75 18 (!) 152/66 99 %   01/15/21 2041 98.2 °F (36.8 °C) 78 18 (!) 147/67 100 %         Intake/Output Summary (Last 24 hours) at 1/16/2021 2001  Last data filed at 1/16/2021 5949  Gross per 24 hour   Intake 480 ml   Output    Net 480 ml       PHYSICAL:  General:  Alert and Responsive, agitated   HEENT: Conjunctiva pink, sclera anicteric. PERRL. EOMI. Pharynx moist, nonerythematous. Moist mucous membranes. CV:  RRR, no murmurs, rubs, or gallops. RESP:  Unlabored breathing. Neuro: A+Ox3. Irritable. Ext:  No edema. 2+ radial and dp pulses bilaterally. ASSESSMENT, PLAN & DISPOSITION   Rapid Response called for Tay Lopez who is a 63y.o. year old female admitted for COPD exacerbation   Rapid response called for odd behavior. No acute change or concerns identified on assessment. Likely continuation of behaviors documented on other encounters.      Patient Active Problem List   Diagnosis Code    Abnormal CT of the chest R93.89    Asthma J45.909    Respiratory failure ([address] J96.90    Acute exacerbation of COPD with asthma ([address] J44.1, J45.901    Essential hypertension I10    Morbid obesity due to excess calories ([address] E66.01    Hypertensive heart failure ([address] I11.0    Sleep apnea G47.30    COPD (chronic obstructive pulmonary disease) with chronic bronchitis ([address] J44.9    T wave inversion in EKG R94.31    Tobacco use Z72.0    S/P hernia repair Z98.890, Z87.19    CKD (chronic kidney disease) stage 3, GFR 30-59 ml/min N18.30    Cocaine abuse ([address] F14.10    Drug-seeking behavior Z76.5    Fibromyalgia M79.7    Depression F32.9    Fe deficiency anemia D50.9    Gastroesophageal reflux disease without esophagitis K21.9    HLD (hyperlipidemia) E78.5    Thyroid goiter E04.9    MDRO (multiple drug resistant organisms) resistance [id number]    On home O2 Z99.81    Polysubstance abuse ([facility]) I71.17    Uncomplicated severe persistent asthma J45.50    Vocal cord disease J38.3    COPD with acute exacerbation ([facility]) J44.1    Chronic respiratory failure with hypoxia ([facility]) J96.11    Acute bronchitis J20.9    Suspected COVID-19 virus infection Z20.822    Tachycardia R00.0    Normocytic anemia D64.9    Acute respiratory distress R06.03    COPD exacerbation ([facility]) J44.1    Acute exacerbation of chronic obstructive pulmonary disease (COPD) ([facility]) J44.1    UTI (urinary tract infection) N39.0    Atypical chest pain R07.89    [facility]ARCUS (acute kidney [facility]) [facility] N17.9    Asthma exacerbation J45. 0    COPD (chronic obstructive pulmonary disease) ([facility]) J44.9    Acute bronchitis with chronic obstructive pulmonary disease (COPD) ([facility]) J44.0, J20.9    CHF (congestive heart failure) ([facility]) I50.9    Long QT interval R94.31           Disposition: to remain in room.        Iain Arce DO  01/16/21  10:04 PM

## 2021-01-17 NOTE — PROGRESS NOTES
Bedside shift change report given to Nya Brown RN (oncoming nurse) by Adriel Monroe RN (offgoing nurse). Report included the following information SBAR, Kardex and MAR.

## 2021-01-17 NOTE — PROGRESS NOTES
Nutrition Note    Pt upset about being on a cardiac diet. Pt ordering foods from outside the facility regularly. Reason for diet explained to patient. Discussed with MD, MD agreeable to liberalizing the diet. Plan to change pt to a regular diet per pt request.  Will continue to follow while inpatient.      Electronically signed by Cortez Lau RD on 1/17/2021 at 12:57 PM    Contact: [phone]

## 2021-01-17 NOTE — PROGRESS NOTES
[facility] Hospitalist Group  Progress Note    Patient: Lisseth Russ Age: 63 y.o. : 1965 MR#: [id number] SSN: [id number]  Date/Time: 2021     Subjective:     Pt seen & evaluated - is very loopy - says she is tired & sleeping. Assessment/Plan:     Acute COPD Exacerbation 2/2 viral PNA  (+) Rhinovirus and Enterovirus Respiratory Panel - Continue Prednisone , pt keeps ripping out her IV lines. She is refusing her Neb Rx    Chronic Diastolic CHF  Leukocytosis - resolved  Hyperkalemia - resolved      Acute Kidney Injury - improved   Cr improved 0.94<1.29 < 1.35     MY     Chronic Prolonged QTC  EKG : QTc 426  EKG : QTc 492  I had resumed her celexa & methadone but she is so loopy today - I stopped her Xanax & methadone - will need to verify with methadone clinic in AM if she has been taking it since her UDS on admission was negative for methadone. She also wandered off the unit yesterday & was loopy when she came back. Will check UDS      Substance Abuse   UDS (+) opiates      Tobacco Use   HTN   HLD      Continue current Rx Plan , will follow     Case discussed with:  []Patient  []Family  []Nursing  []Case Management  DVT Prophylaxis:  []Lovenox  []Hep SQ  []SCDs  []Coumadin   []On Heparin gtt    Objective:   VS:   Visit Vitals  BP (!) 189/94 (BP 1 Location: Right arm, BP Patient Position: At rest)   Pulse 81   Temp 98.7 °F (37.1 °C)   Resp 20   Ht 4' 11\" (1.499 m)   LMP 2009   SpO2 96%   Breastfeeding No   BMI 31.10 kg/m²      Tmax/24hrs: Temp (24hrs), Av.1 °F (36.7 °C), Min:97 °F (36.1 °C), Max:98.7 °F (37.1 °C)  IOBRIEF  No intake or output data in the 24 hours ending 21 1510    General:  Sleepy but arousable   HEENT: PERRLA, anicteric sclerae. Pulmonary: Decreased BS in bases with expiratory wheezing   Cardiovascular: Regular rate and Rhythm. GI:  Soft, Non distended, Non tender. + Bowel sounds.   Extremities:  No edema, cyanosis, clubbing. No calf tenderness. Neurologic: Alert and oriented X 3. No acute neuro deficits. Additional:    Medications:   Current Facility-Administered Medications   Medication Dose Route Frequency    albuterol-ipratropium (DUO-NEB) 2.5 MG-0.5 MG/3 ML  3 mL Nebulization Q6H RT    predniSONE (DELTASONE) tablet 60 mg  60 mg Oral DAILY WITH BREAKFAST    citalopram (CELEXA) tablet 20 mg  20 mg Oral DAILY    guaiFENesin-codeine (ROBITUSSIN AC) 100-10 mg/5 mL solution 5 mL  5 mL Oral Q4H PRN    arformoteroL (BROVANA) neb solution 15 mcg  15 mcg Nebulization BID RT    budesonide (PULMICORT) 500 mcg/2 ml nebulizer suspension  1,000 mcg Nebulization BID RT    amLODIPine (NORVASC) tablet 10 mg  10 mg Oral DAILY    aspirin delayed-release tablet 81 mg  81 mg Oral DAILY    atorvastatin (LIPITOR) tablet 20 mg  20 mg Oral QHS    famotidine (PEPCID) tablet 20 mg  20 mg Oral DAILY    [Held by provider] lisinopriL (PRINIVIL, ZESTRIL) tablet 20 mg  20 mg Oral DAILY    montelukast (SINGULAIR) tablet 10 mg  10 mg Oral QHS    acetaminophen (TYLENOL) tablet 650 mg  650 mg Oral Q6H PRN    Or    acetaminophen (TYLENOL) suppository 650 mg  650 mg Rectal Q6H PRN    polyethylene glycol (MIRALAX) packet 17 g  17 g Oral DAILY PRN    heparin (porcine) injection 5,000 Units  5,000 Units SubCUTAneous Q8H    sodium chloride (NS) flush 5-40 mL  5-40 mL IntraVENous Q8H    sodium chloride (NS) flush 5-40 mL  5-40 mL IntraVENous PRN    albuterol (ACCUNEB) nebulizer solution 2.5 mg  2.5 mg Inhalation Q4H PRN    nitroglycerin (NITROSTAT) tablet 0.4 mg  0.4 mg SubLINGual Q5MIN PRN    cloNIDine HCL (CATAPRES) tablet 0.2 mg  0.2 mg Oral BID       Imaging:   XR Results (most recent):  Results from Hospital Encounter encounter on 01/09/21   XR CHEST PORT    Narrative Portable Frontal Chest.    CLINICAL HISTORY: Shortness of breath. TECHNIQUE: Single frontal view of the chest, obtained portably. COMPARISONS: 1/8/2020.     FINDINGS: Mild haziness in the left midlung field. Mild septal thickening right lung  base. . The cardiomediastinal silhouette is unremarkable. Pulmonary vascularity  is normal, however. There are no effusions. Impression IMPRESSION:    Likely mild edema. Pneumonitis less likely. Correlate clinically and consider  follow-up. CT Results (most recent):  Results from Hospital Encounter encounter on 10/11/20   CTA CHEST W OR W WO CONT    Narrative CTA CHEST PULMONARY EMBOLISM PROTOCOL        INDICATION: Heart failure, chronic kidney disease, cocaine abuse, oxygen  dependence, hypertension, fibromyalgia, morbid obesity, STEMI with exertional  chest and flank pain, pleuritic. . Question pulmonary embolism. TECHNIQUE: Thin collimation axial images obtained through the level of the  pulmonary arteries with additional imaging through the chest following the  uneventful administration of 100 mL Isovue-370 nonionic intravenous contrast.   Images reconstructed into three dimensional coronal and sagittal projections for  complete evaluation of the tortuous and overlapping pulmonary vascular  structures and to reduce patient radiation dose. All CT scans at this facility are performed using dose optimization technique as  appropriate to a performed exam, to include automated exposure control,  adjustment of the mA and/or kV according to patient size (including appropriate  matching first site-specific examinations), or use of iterative reconstruction  technique. COMPARISON: 10/8/2020; 7/26/2020. FINDINGS:    No filling defects are appreciated within the main, left, right, lobar or  visualized segmental pulmonary arteries to suggest embolism. Main pulmonary  artery is 3.1 cm transverse diameter. Thyroid: Right lobe is 4 cm and the left is 3 cm. It is diffusely heterogeneous  and likely nodular. Pericardium/ Heart: Left atrium appears dilated. Aorta/ Vessels: No aneurysm or dissection.   Lymph Nodes: Unremarkable. .    Lungs: Mild central bronchial wall thickening. Limited evaluation secondary to  respiratory motion. Mild regions of subsegmental atelectasis or scarring lung  bases. Pleura: No effusion. Upper Abdomen: Left lateral hepatic cysts measure up to 1.5 cm. Left adrenal  thickening. Incompletely visualized at least 2.2 cm left upper pole renal cyst.    Bones/soft tissues: Mild anasarca. No acute bony findings. Impression IMPRESSION:  No evidence for pulmonary emboli. Mild bronchitis or central venous congestion. Main pulmonary artery is mildly enlarged as may be seen in pulmonary arterial  hypertension. Left atrium appears enlarged. Thyromegaly and multinodular goiter. Mild anasarca. 10/19/20   ECHO ADULT FOLLOW-UP OR LIMITED 10/19/2020 10/19/2020    Narrative · LV: Estimated LVEF is 60 - 65%. Visually measured ejection fraction. Normal cavity size and systolic function (ejection fraction normal). Moderate concentric hypertrophy. Wall motion: normal.  · PA: Pulmonary arterial systolic pressure is 25 mmHg.         Signed by: Dorrine Dakins, MD        Labs:    Recent Results (from the past 48 hour(s))   EKG, 12 LEAD, SUBSEQUENT    Collection Time: 01/15/21  3:36 PM   Result Value Ref Range    Ventricular Rate 84 BPM    Atrial Rate 84 BPM    P-R Interval 118 ms    QRS Duration 88 ms    Q-T Interval 370 ms    QTC Calculation (Bezet) 437 ms    Calculated P Axis 68 degrees    Calculated R Axis 58 degrees    Calculated T Axis 68 degrees    Diagnosis       Sinus rhythm with premature atrial complexes  Otherwise normal ECG  When compared with ECG of 14-JAN-2021 15:06,  premature atrial complexes are now present  Nonspecific T wave abnormality no longer evident in Inferior leads  Confirmed by Rebeka Soto [id number]) on 1/17/2021 11:58:09 AM     EKG, 12 LEAD, INITIAL    Collection Time: 01/16/21  7:43 PM   Result Value Ref Range    Ventricular Rate 114 BPM    Atrial Rate 228 BPM    QRS Duration 80 ms    Q-T Interval 320 ms    QTC Calculation (Bezet) 441 ms    Calculated P Axis 70 degrees    Calculated R Axis 40 degrees    Calculated T Axis 77 degrees    Diagnosis       Normal sinus rhythm  Anterior infarct , age undetermined  Abnormal ECG    Confirmed by Rebeka Soto ([id number]) on 1/17/2021 13:58:96 AM     METABOLIC PANEL, COMPREHENSIVE    Collection Time: 01/16/21  7:44 PM   Result Value Ref Range    Sodium 136 136 - 145 mmol/L    Potassium 5.2 3.5 - 5.5 mmol/L    Chloride 105 100 - 111 mmol/L    CO2 26 21 - 32 mmol/L    Anion gap 5 3.0 - 18 mmol/L    Glucose 145 (H) 74 - 99 mg/dL    BUN 37 (H) 7.0 - 18 MG/DL    Creatinine 1.34 (H) 0.6 - 1.3 MG/DL    BUN/Creatinine ratio 28 (H) 12 - 20      GFR est AA 50 (L) >60 ml/min/1.73m2    GFR est non-AA 41 (L) >60 ml/min/1.73m2    Calcium 8.2 (L) 8.5 - 10.1 MG/DL    Bilirubin, total 0.7 0.2 - 1.0 MG/DL    ALT (SGPT) 39 13 - 56 U/L    AST (SGOT) 9 (L) 10 - 38 U/L    Alk. phosphatase 81 45 - 117 U/L    Protein, total 6.3 (L) 6.4 - 8.2 g/dL    Albumin 3.0 (L) 3.4 - 5.0 g/dL    Globulin 3.3 2.0 - 4.0 g/dL    A-G Ratio 0.9 0.8 - 1.7     CARDIAC PANEL,(CK, CKMB & TROPONIN)    Collection Time: 01/16/21  7:44 PM   Result Value Ref Range    CK - MB 2.6 <3.6 ng/ml    CK-MB Index 4.9 (H) 0.0 - 4.0 %    CK 53 26 - 192 U/L    Troponin-I, QT <0.02 0.0 - 0.045 NG/ML       Signed By: Maria Elena Britt MD     January 17, 2021      I spent 35 minutes with the patient in face-to-face consultation, of which greater than 50% was spent in counseling and coordination of care as described above    Disclaimer: Sections of this note are dictated using utilizing voice recognition software. Minor typographical errors may be present. If questions arise, please do not hesitate to contact me or call our department.

## 2021-01-18 VITALS
BODY MASS INDEX: 31.1 KG/M2 | TEMPERATURE: 97.4 F | OXYGEN SATURATION: 99 % | SYSTOLIC BLOOD PRESSURE: 189 MMHG | DIASTOLIC BLOOD PRESSURE: 89 MMHG | HEART RATE: 90 BPM | HEIGHT: 59 IN | RESPIRATION RATE: 18 BRPM

## 2021-01-18 PROCEDURE — 2709999900 HC NON-CHARGEABLE SUPPLY

## 2021-01-18 PROCEDURE — 74011250636 HC RX REV CODE- 250/636: Performed by: HOSPITALIST

## 2021-01-18 PROCEDURE — 74011636637 HC RX REV CODE- 636/637: Performed by: HOSPITALIST

## 2021-01-18 PROCEDURE — 99232 SBSQ HOSP IP/OBS MODERATE 35: CPT | Performed by: INTERNAL MEDICINE

## 2021-01-18 PROCEDURE — 74011000250 HC RX REV CODE- 250: Performed by: PHYSICIAN ASSISTANT

## 2021-01-18 PROCEDURE — 74011250637 HC RX REV CODE- 250/637: Performed by: HOSPITALIST

## 2021-01-18 PROCEDURE — 74011000250 HC RX REV CODE- 250: Performed by: INTERNAL MEDICINE

## 2021-01-18 PROCEDURE — 94640 AIRWAY INHALATION TREATMENT: CPT

## 2021-01-18 PROCEDURE — 74011250637 HC RX REV CODE- 250/637: Performed by: INTERNAL MEDICINE

## 2021-01-18 PROCEDURE — 99239 HOSP IP/OBS DSCHRG MGMT >30: CPT | Performed by: HOSPITALIST

## 2021-01-18 RX ORDER — ALBUTEROL SULFATE 90 UG/1
1 AEROSOL, METERED RESPIRATORY (INHALATION)
Status: DISCONTINUED | OUTPATIENT
Start: 2021-01-18 | End: 2021-01-18 | Stop reason: CLARIF

## 2021-01-18 RX ORDER — ALBUTEROL SULFATE 0.83 MG/ML
2.5 SOLUTION RESPIRATORY (INHALATION)
Status: DISCONTINUED | OUTPATIENT
Start: 2021-01-18 | End: 2021-01-18 | Stop reason: HOSPADM

## 2021-01-18 RX ORDER — ALBUTEROL SULFATE 90 UG/1
2 AEROSOL, METERED RESPIRATORY (INHALATION)
Qty: 1 INHALER | Refills: 0 | OUTPATIENT
Start: 2021-01-18 | End: 2021-01-29

## 2021-01-18 RX ORDER — PREDNISONE 20 MG/1
60 TABLET ORAL
Qty: 5 TAB | Refills: 0 | Status: SHIPPED | OUTPATIENT
Start: 2021-01-19 | End: 2021-03-01

## 2021-01-18 RX ORDER — FLUTICASONE FUROATE AND VILANTEROL TRIFENATATE 200; 25 UG/1; UG/1
1 POWDER RESPIRATORY (INHALATION) DAILY
Qty: 1 INHALER | Refills: 3 | Status: SHIPPED | OUTPATIENT
Start: 2021-01-18 | End: 2021-04-10 | Stop reason: SDUPTHER

## 2021-01-18 RX ADMIN — CLONIDINE HYDROCHLORIDE 0.2 MG: 0.1 TABLET ORAL at 09:44

## 2021-01-18 RX ADMIN — IPRATROPIUM BROMIDE AND ALBUTEROL SULFATE 3 ML: .5; 3 SOLUTION RESPIRATORY (INHALATION) at 14:00

## 2021-01-18 RX ADMIN — Medication 10 ML: at 05:27

## 2021-01-18 RX ADMIN — PREDNISONE 60 MG: 20 TABLET ORAL at 09:44

## 2021-01-18 RX ADMIN — HEPARIN SODIUM 5000 UNITS: 5000 INJECTION INTRAVENOUS; SUBCUTANEOUS at 01:19

## 2021-01-18 RX ADMIN — AMLODIPINE BESYLATE 10 MG: 10 TABLET ORAL at 12:00

## 2021-01-18 RX ADMIN — CITALOPRAM HYDROBROMIDE 20 MG: 20 TABLET ORAL at 09:44

## 2021-01-18 RX ADMIN — ASPIRIN 81 MG: 81 TABLET, COATED ORAL at 09:44

## 2021-01-18 RX ADMIN — BUDESONIDE 500 MCG: 0.5 SUSPENSION RESPIRATORY (INHALATION) at 07:20

## 2021-01-18 RX ADMIN — ARFORMOTEROL TARTRATE 15 MCG: 15 SOLUTION RESPIRATORY (INHALATION) at 07:19

## 2021-01-18 RX ADMIN — FAMOTIDINE 20 MG: 20 TABLET, FILM COATED ORAL at 09:44

## 2021-01-18 RX ADMIN — NITROGLYCERIN 0.4 MG: 0.4 TABLET SUBLINGUAL at 06:34

## 2021-01-18 RX ADMIN — AMLODIPINE BESYLATE 10 MG: 10 TABLET ORAL at 06:43

## 2021-01-18 RX ADMIN — IPRATROPIUM BROMIDE AND ALBUTEROL SULFATE 3 ML: .5; 3 SOLUTION RESPIRATORY (INHALATION) at 07:19

## 2021-01-18 NOTE — PROGRESS NOTES
D/c order noted. texed Dr. Nohelia Cox for order for [facility]monary [facility]hab. Brady Stover of Pulmonary Rehab aware.         CHARITO OzunaN RN  Care Management  Pager: [phone]

## 2021-01-18 NOTE — PROGRESS NOTES
Pt came to nursing station screaming and cussing at staff. Pt was not able to be redirected and was threatening staff and patient stated \"y'all better call the efing police because you're Deborha Awkward need them\". Nursing supervisor and security was altered and came to the unit to attempt to talk to the patient. Patient was taken back to her room by the nursing supervisor.

## 2021-01-18 NOTE — PROGRESS NOTES
Problem: Falls - Risk of  Goal: *Absence of Falls  Description: Document Sandra Upton Fall Risk and appropriate interventions in the flowsheet.   Outcome: Progressing Towards Goal  Note: Fall Risk Interventions:  Mobility Interventions: Bed/chair exit alarm         Medication Interventions: Patient to call before getting OOB         History of Falls Interventions: Door open when patient unattended         Problem: Patient Education: Go to Patient Education Activity  Goal: Patient/Family Education  Outcome: Progressing Towards Goal

## 2021-01-18 NOTE — DISCHARGE SUMMARY
Discharge Summary    Patient: Reynold Marie MRN: [id number]  CSN: [id number]    YOB: 1965  Age: 63 y.o. Sex: female    DOA: 1/9/2021 LOS:  LOS: 9 days   Discharge Date:      Admission Diagnoses: Acute exacerbation of chronic obstructive pulmonary disease (COPD) ([facility] [address] [J44.1]  COPD exacerbation ([facility] [J44.1]    Discharge Diagnoses:    Acute COPD Exacerbation secondary to Viral Pneumonia   Chronic Diastolic CHF  Acute Kidney Injury   Obstructive Sleep Apnea   Chronic Prolonged QTc   Substance Abuse   Tobacco Use  Hypertension   Hyperlipidemia     Discharge Condition: Stable    Consults: Cardiology and Pulmonary/Critical Care    PHYSICAL EXAM  Visit Vitals  BP (!) 189/89 (BP 1 Location: Right arm, BP Patient Position: Sitting)   Pulse 90   Temp 97.4 °F (36.3 °C)   Resp 18   Ht 4' 11\" (1.499 m)   SpO2 99%   Breastfeeding No   BMI 31.10 kg/m²       General: Alert, cooperative, no acute distress    HEENT: PERRLA, EOMI. Anicteric sclerae. Lungs:  CTA Bilaterally. No Wheezing/Rhonchi/Rales. Heart:  Regular rate and Rhythm. Abdomen: Soft, Non distended, Non tender. + Bowel sounds. Extremities: No edema/ cyanosis/ clubbing  Psych:   Good insight. Not anxious or agitated. Neurologic:  AA oriented X 3. Moves all extremities. HPI:  Reynold Marie is a 63 y.o. female who has a complex past medical history significant for COPD, HTN, chronic diastolic CHF, chronic hypoxic respiratory failure, depression, chronic pain, and hx IVDU on methadone. She presented to the ED for COPD exacerbation, was found to have prolonged QTc interval.   She was admitted yesterday for prolonged QTc interval and was seen by cardiology in the emergency room however signed out [place] later. Patient comes back to the emergency room today because she continues to be short of breath.   Patient is currently being admitted to the hospital secondary to COPD exacerbation and also prolonged QT interval.      Hospital Course:   Ms. Kenyetta Aviles was admitted to the hospital on 1/9/21 due to acute exacerbation of COPD secondary to a viral pneumonia with a respiratory panel positive for rhinovirus and enterovirus, productive cough, as well as a prolonged QT interval. Patient was started on IV cefepime and azithromycin antibiotics and IV steroids upon admission with continued bronchodilator therapy. Cardiology and pulmonology were consulted and participated in treatment. Outpatient antihypertensive regimen was resumed in the hospital. Cardiac monitoring was in place to monitor her QTc to ensure QTc <500 ms. Patient received duo-nebulization, IV methylprednisolone, and oxygen support via nasal canula to maintain SpO2 90-94%. Singulair was added to her medication regimen, as well as Mucinex, Robitussin for her productive cough. Lasix was added with strict monitoring of intake and output. Leukocytosis was noted likely due to IV steroid. Lisinopril was held due to hyperkalemia. Leukocytosis and hyperkalemia resolved. Pulmicort and Brovana nebulizations were added to treatment regimen. Patient continued to experience a productive cough, shortness of breath and diffuse wheezing. Mucomyst was added to assist with productive cough. Urine drug screen on 1/13 was positive for opiates. Patient refused nebulizer treatments. Lasix was discontinued. She frequently roamed the hallways of the hospital, as well as went to the main entrance to visit with relatives/friends. Her Celexa outpatient regimen was resumed and Xanax was added as needed per patient's request to help her calm down - pharmacy was consulted for approval considering prolonged QTc. Patient received 1 additional dose of solumedrol on 1/16. Guaifenesin and codeine were added for cough. Patient left the floor on 1/16 and returned acting differently, a urine drug screen was ordered.  Her IV steroid was weaned to oral prednisone on 1/17 and was tolerated with improvement in breathing and wheezing. Patient is stable today, with improved breathing and cough, able to return home today with supplemental oxygen. Imaging:  XR Results (most recent):  Results from Hospital Encounter encounter on 01/09/21   XR CHEST PORT    Narrative Portable Frontal Chest.    CLINICAL HISTORY: Shortness of breath. TECHNIQUE: Single frontal view of the chest, obtained portably. COMPARISONS: 1/8/2020. FINDINGS:       Mild haziness in the left midlung field. Mild septal thickening right lung  base. . The cardiomediastinal silhouette is unremarkable. Pulmonary vascularity  is normal, however. There are no effusions. Impression IMPRESSION:    Likely mild edema. Pneumonitis less likely. Correlate clinically and consider  follow-up. CT Results (most recent):  Results from Hospital Encounter encounter on 10/11/20   CTA CHEST W OR W WO CONT    Narrative CTA CHEST PULMONARY EMBOLISM PROTOCOL        INDICATION: Heart failure, chronic kidney disease, cocaine abuse, oxygen  dependence, hypertension, fibromyalgia, morbid obesity, STEMI with exertional  chest and flank pain, pleuritic. . Question pulmonary embolism. TECHNIQUE: Thin collimation axial images obtained through the level of the  pulmonary arteries with additional imaging through the chest following the  uneventful administration of 100 mL Isovue-370 nonionic intravenous contrast.   Images reconstructed into three dimensional coronal and sagittal projections for  complete evaluation of the tortuous and overlapping pulmonary vascular  structures and to reduce patient radiation dose.      All CT scans at this facility are performed using dose optimization technique as  appropriate to a performed exam, to include automated exposure control,  adjustment of the mA and/or kV according to patient size (including appropriate  matching first site-specific examinations), or use of iterative reconstruction  technique. COMPARISON: 10/8/2020; 7/26/2020. FINDINGS:    No filling defects are appreciated within the main, left, right, lobar or  visualized segmental pulmonary arteries to suggest embolism. Main pulmonary  artery is 3.1 cm transverse diameter. Thyroid: Right lobe is 4 cm and the left is 3 cm. It is diffusely heterogeneous  and likely nodular. Pericardium/ Heart: Left atrium appears dilated. Aorta/ Vessels: No aneurysm or dissection. Lymph Nodes: Unremarkable. .    Lungs: Mild central bronchial wall thickening. Limited evaluation secondary to  respiratory motion. Mild regions of subsegmental atelectasis or scarring lung  bases. Pleura: No effusion. Upper Abdomen: Left lateral hepatic cysts measure up to 1.5 cm. Left adrenal  thickening. Incompletely visualized at least 2.2 cm left upper pole renal cyst.    Bones/soft tissues: Mild anasarca. No acute bony findings. Impression IMPRESSION:  No evidence for pulmonary emboli. Mild bronchitis or central venous congestion. Main pulmonary artery is mildly enlarged as may be seen in pulmonary arterial  hypertension. Left atrium appears enlarged. Thyromegaly and multinodular goiter. Mild anasarca. 10/19/20   ECHO ADULT FOLLOW-UP OR LIMITED 10/19/2020 10/19/2020    Narrative · LV: Estimated LVEF is 60 - 65%. Visually measured ejection fraction. Normal cavity size and systolic function (ejection fraction normal). Moderate concentric hypertrophy. Wall motion: normal.  · PA: Pulmonary arterial systolic pressure is 25 mmHg. Signed by: Nghia Ramos MD          Procedures:   None        Discharge Medications:     Current Discharge Medication List      START taking these medications    Details   predniSONE (DELTASONE) 20 mg tablet Take 60 mg by mouth daily (with breakfast).   Qty: 5 Tab, Refills: 0         CONTINUE these medications which have NOT CHANGED    Details   albuterol (PROVENTIL HFA, VENTOLIN HFA, PROAIR HFA) 90 mcg/actuation inhaler Take 2 Puffs by inhalation every four (4) hours as needed for Wheezing or Shortness of Breath. Indications: bronchospasm prevention  Qty: 1 Inhaler, Refills: 4      nitroglycerin (NITROSTAT) 0.4 mg SL tablet Take 1 Tab by mouth every five (5) minutes as needed for Chest Pain. Sit/Lay down then put one tab under the tongue every 5 minutes as needed for chest pain for 3 doses  Qty: 1 Bottle, Refills: 0      aspirin delayed-release 81 mg tablet Take 1 Tab by mouth daily. Qty: 30 Tab, Refills: 0      amLODIPine (NORVASC) 10 mg tablet Take 1 Tab by mouth daily for 30 days. Indications: high blood pressure  Qty: 30 Tab, Refills: 0      lisinopriL (PriniviL) 20 mg tablet Take 1 Tab by mouth daily for 30 days. Qty: 30 Tab, Refills: 0      !! cloNIDine HCL (CATAPRES) 0.2 mg tablet Take 1 Tab by mouth three (3) times daily for 30 days. Qty: 90 Tab, Refills: 0      fluticasone furoate-vilanteroL (Breo Ellipta) 200-25 mcg/dose inhaler Take 1 Puff by inhalation daily. Indications: controller medication for asthma  Qty: 1 Inhaler, Refills: 3      montelukast (SINGULAIR) 10 mg tablet Take 1 Tab by mouth nightly. Indications: controller medication for asthma  Qty: 30 Tab, Refills: 1      roflumilast (DALIRESP) 500 mcg tab tablet Take 1 Tab by mouth daily. Qty: 30 Tab, Refills: 3      albuterol (ACCUNEB) 1.25 mg/3 mL nebu Take 3 mL by inhalation every four (4) hours as needed for Wheezing (wheezing). Indications: bronchospasm prevention  Qty: 25 Each, Refills: 5      citalopram (CeleXA) 20 mg tablet Take 1 Tab by mouth every morning. Qty: 30 Tab, Refills: 0      !! cloNIDine HCL (CATAPRES) 0.2 mg tablet Take 0.2 mg by mouth three (3) times daily. famotidine (PEPCID) 20 mg tablet Take 1 Tab by mouth daily. Qty: 30 Tab, Refills: 1      OXYGEN-AIR DELIVERY SYSTEMS 3 L by IntraNASal route as needed for Other (sob). atorvastatin (LIPITOR) 20 mg tablet Take 1 Tab by mouth nightly.   Qty: 30 Tab, Refills: 0      naloxone (NARCAN) 4 mg/actuation nasal spray Use 1 spray intranasally, then discard. Repeat with new spray every 2 min as needed for opioid overdose symptoms, alternating nostrils. Qty: 1 Each, Refills: 0       !! - Potential duplicate medications found. Please discuss with provider.          Current Facility-Administered Medications:     albuterol-ipratropium (DUO-NEB) 2.5 MG-0.5 MG/3 ML, 3 mL, Nebulization, Q6H RT, Mark Bryant MD, 3 mL at 01/18/21 1400    predniSONE (DELTASONE) tablet 60 mg, 60 mg, Oral, DAILY WITH BREAKFAST, Ray Lehman MD, 60 mg at 01/18/21 0944    citalopram (CELEXA) tablet 20 mg, 20 mg, Oral, DAILY, Ray Lehman MD, 20 mg at 01/18/21 0944    guaiFENesin-codeine (ROBITUSSIN AC) 100-10 mg/5 mL solution 5 mL, 5 mL, Oral, Q4H PRN, Dexter Jensen MD, 5 mL at 01/17/21 0519    arformoteroL (BROVANA) neb solution 15 mcg, 15 mcg, Nebulization, BID RT, Hector Alcantara PA-C, 15 mcg at 01/18/21 0719    budesonide (PULMICORT) 500 mcg/2 ml nebulizer suspension, 1,000 mcg, Nebulization, BID RT, Hector Alcantara PA-C, 500 mcg at 01/18/21 0720    amLODIPine (NORVASC) tablet 10 mg, 10 mg, Oral, DAILY, Ray Lehman MD, 10 mg at 01/18/21 1200    aspirin delayed-release tablet 81 mg, 81 mg, Oral, DAILY, Ray Lehman MD, 81 mg at 01/18/21 0944    atorvastatin (LIPITOR) tablet 20 mg, 20 mg, Oral, QHS, Ray Lehman MD, 20 mg at 01/17/21 2152    famotidine (PEPCID) tablet 20 mg, 20 mg, Oral, DAILY, Ray Lehman MD, 20 mg at 01/18/21 0944    [Held by provider] lisinopriL (PRINIVIL, ZESTRIL) tablet 20 mg, 20 mg, Oral, DAILY, Ray Lehman MD, Stopped at 01/18/21 0900    montelukast (SINGULAIR) tablet 10 mg, 10 mg, Oral, QHS, Ray Lehman MD, 10 mg at 01/17/21 2152    acetaminophen (TYLENOL) tablet 650 mg, 650 mg, Oral, Q6H PRN **OR** acetaminophen (TYLENOL) suppository 650 mg, 650 mg, Rectal, Q6H PRN, Ray Lehman MD  [facility] polyethylene glycol (MIRALAX) packet 17 g, 17 g, Oral, DAILY PRN, Khang Burks MD    heparin (porcine) injection 5,000 Units, 5,000 Units, SubCUTAneous, Q8H, Khang Burks MD, 5,000 Units at 01/18/21 0119    sodium chloride (NS) flush 5-40 mL, 5-40 mL, IntraVENous, Q8H, Khang Burks MD, 10 mL at 01/18/21 0527    sodium chloride (NS) flush 5-40 mL, 5-40 mL, IntraVENous, PRN, Khang Burks MD    albuterol (ACCUNEB) nebulizer solution 2.5 mg, 2.5 mg, Inhalation, Q4H PRN, Khang Burks MD, 2.5 mg at 01/11/21 1343    nitroglycerin (NITROSTAT) tablet 0.4 mg, 0.4 mg, SubLINGual, Q5MIN PRN, Khang Burks MD, 0.4 mg at 01/18/21 7073    cloNIDine HCL (CATAPRES) tablet 0.2 mg, 0.2 mg, Oral, BID, Luis Feilpe Aviles MD, 0.2 mg at 01/18/21 0917  · It is important that you take the medication exactly as they are prescribed. · Keep your medication in the bottles provided by the pharmacist and keep a list of the medication names, dosages, and times to be taken in your wallet. · Do not take other medications without consulting your doctor. Discharge To:  Home    Minutes spent on discharge: 50 minutes spent coordinating this discharge (review instructions/follow-up, prescriptions, preparing report for sign off)    Michael Espitia MD  1/18/2021 11:29 AM

## 2021-01-18 NOTE — PROGRESS NOTES
Albuterol nebulizer was therapeutically interchanged for albuterol MDI per the P &T Committee approved Therapeutic Interchanges Policy.

## 2021-01-18 NOTE — PROGRESS NOTES
Call made to [facility] (Dr. Nile Kayser) to make transitional care follow up, office is closed at this time.

## 2021-01-18 NOTE — PROGRESS NOTES
Call made to [facility] [employer] transportation [phone], patient scheduled transportation home.  will call nurses station with ETA. Conf # [id number].

## 2021-01-18 NOTE — PROGRESS NOTES
*ATTENTION:  This note has been created by a medical student for educational purposes only. Please do not refer to the content of this note for clinical decision-making, billing, or other purposes. Please see attending physicians note to obtain clinical information on this patient. *       Student Progress Note  Please refer to attendings daily rounding note for full details      Patient: Tyrone Jane MRN: [id number]  CSN: [id number]    YOB: 1965  Age: 63 y.o. Sex: female    DOA: 1/9/2021 LOS:  LOS: 9 days          Subjective:   Patient has no new complaints this morning. She states she feels her breathing and cough have not improved. Objective:      Visit Vitals  BP (!) 182/82 (BP 1 Location: Right arm, BP Patient Position: Sitting)   Pulse 87   Temp 97.4 °F (36.3 °C)   Resp 17   Ht 4' 11\" (1.499 m)   SpO2 96%   Breastfeeding No   BMI 31.10 kg/m²         Physical Exam:  Gen: Patient sitting up in bed in no acute distress. Appears tired. On 4L NC.   HEENT: Neck supple, no JVD. Anicteric sclera. CV: RRR, normal S1, S2. No murmurs, rubs or gallops. Resp: Breathing not labored. Diffuse wheezing auscultated but improved. Abd: Soft, non-distended. Extrem:  No edema. Skin: Warm, dry. I have reviewed the patient's Labs and Radiology studies. Assessment:   Acute COPD Exacerbation 2/2 viral PNA  (+) Rhinovirus and Enterovirus Respiratory Panel     Chronic Diastolic CHF  Leukocytosis - resolved  Hyperkalemia - resolved      Acute Kidney Injury   1/16 Cr 1.34 < 0.81     MY     Chronic Prolonged QTC  EKG 1/16: QTc 441  EKG 1/15: QTc 437   EKG 1/14: QTc 426   EKG 1/9: QTc 492  Celexa resumed on 1/14/21     Substance Abuse  1/13 UDS (+) opiates   1/16 UDS pending      Tobacco Use   HTN   HLD      Plan:   Continue cardiac diet, limit sodium intake. Currently on 4L supplemental oxygen, titrate as able. Maintain O2>92%.   Continue DUO-NEB q6h.    Continue arformoterol BID.  Continue Budesonide BID. Continue prednisone 60 mg tablet daily with breakfast.   Continue montelukast 10 mg tablet at bedtime. Continue clonidine HCL 0.2 mg tablet BID  Robitussin AC q4h PRN. Pulmonology participating in treatment.   Continue amlodipine 10 mg tablet for HTN. Continue lipitor 20 mg tablet for HLD. Continue Aspirin delayed-release 81 mg tablet. Continue heparin SQ injection q8h.    Citalopram 20 mg tablet daily. Follow EKGs. Continue to monitor BMP. PT/OT/OOB to ambulate around room.   Encourage cessation of tobacco and substance use .   Anticipate discharge today or tomorrow. Yesica Correa  1/18/2021  9:37 AM  *ATTENTION:  This note has been created by a medical student for educational purposes only. Please do not refer to the content of this note for clinical decision-making, billing, or other purposes. Please see attending physicians note to obtain clinical information on this patient. *

## 2021-01-18 NOTE — PROGRESS NOTES
Problem: Patient Education: Go to Patient Education Activity  Goal: Patient/Family Education  1/18/2021 1101 by Yvon Wright RN  Outcome: Resolved/Met  1/18/2021 1101 by Yvon Wright RN  Outcome: Progressing Towards Goal

## 2021-01-18 NOTE — PROGRESS NOTES
Attempting to d/c patient pt refusing to be d/c. Made provider Dr Nafisa Ji, Unit Mgr Janis Schmid and [address] [place] [address] aware of patient refusing. Not able to do anything else at this point. Will allow time for patient to obtain a ride prior to calling security.
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
## 2021-01-18 NOTE — MED STUDENT NOTES
Discharge Summary Patient: Lisseth Russ MRN: [id number]  CSN: [id number] YOB: 1965  Age: 63 y.o. Sex: female DOA: 1/9/2021 LOS:  LOS: 9 days   Discharge Date:   
 
Admission Diagnoses: Acute exacerbation of chronic obstructive pulmonary disease (COPD) ([facility] [J44.1] COPD exacerbation ([facility] [J44.1] Discharge Diagnoses:   
Acute COPD Exacerbation secondary to Viral Pneumonia Chronic Diastolic CHF Acute Kidney Injury Obstructive Sleep Apnea Chronic Prolonged QTc Substance Abuse Tobacco Use Hypertension Hyperlipidemia Discharge Condition: Stable Consults: Cardiology and Pulmonary/Critical Care PHYSICAL EXAM 
Visit Vitals BP (!) 189/89 (BP 1 Location: Right arm, BP Patient Position: Sitting) Pulse 90 Temp 97.4 °F (36.3 °C) Resp 18 Ht 4' 11\" (1.499 m) SpO2 98% Breastfeeding No  
BMI 31.10 kg/m² General: Alert, cooperative, no acute distress HEENT: PERRLA, EOMI. Anicteric sclerae. Lungs:  CTA Bilaterally. No Wheezing/Rhonchi/Rales. Heart:  Regular rate and Rhythm. Abdomen: Soft, Non distended, Non tender. + Bowel sounds. Extremities: No edema/ cyanosis/ clubbing Psych:   Good insight. Not anxious or agitated. Neurologic:  AA oriented X 3. Moves all extremities. HPI: 
Lisseth Russ is a 63 y.o. female who has a complex past medical history significant for COPD, HTN, chronic diastolic CHF, chronic hypoxic respiratory failure, depression, chronic pain, and hx IVDU on methadone. She presented to the ED for COPD exacerbation, was found to have prolonged QTc interval.  
She was admitted yesterday for prolonged QTc interval and was seen by cardiology in the emergency room however signed out [place] later. Patient comes back to the emergency room today because she continues to be short of breath.   Patient is currently being admitted to the hospital secondary to COPD exacerbation and also prolonged QT interval. 
 
 Hospital Course: Ms. Russell Mckeon was admitted to the hospital on 1/9/21 due to acute exacerbation of COPD secondary to a viral pneumonia with a respiratory panel positive for rhinovirus and enterovirus, productive cough, as well as a prolonged QT interval. Patient was started on IV cefepime and azithromycin antibiotics and IV steroids upon admission with continued bronchodilator therapy. Cardiology and pulmonology were consulted and participated in treatment. Outpatient antihypertensive regimen was resumed in the hospital. Cardiac monitoring was in place to monitor her QTc to ensure QTc <500 ms. Patient received duo-nebulization, IV methylprednisolone, and oxygen support via nasal canula to maintain SpO2 90-94%. Singulair was added to her medication regimen, as well as Mucinex, Robitussin for her productive cough. Lasix was added with strict monitoring of intake and output. Leukocytosis was noted likely due to IV steroid. Lisinopril was held due to hyperkalemia. Leukocytosis and hyperkalemia resolved. Pulmicort and Brovana nebulizations were added to treatment regimen. Patient continued to experience a productive cough, shortness of breath and diffuse wheezing. Mucomyst was added to assist with productive cough. Urine drug screen on 1/13 was positive for opiates. Patient refused nebulizer treatments. Lasix was discontinued. She frequently roamed the hallways of the hospital, as well as went to the main entrance to visit with relatives/friends. Her Celexa outpatient regimen was resumed and Xanax was added as needed per patient's request to help her calm down - pharmacy was consulted for approval considering prolonged QTc. Patient received 1 additional dose of solumedrol on 1/16. Guaifenesin and codeine were added for cough. Patient left the floor on 1/16 and returned acting differently, a urine drug screen was ordered. Her IV steroid was weaned to oral prednisone on 1/17 and was tolerated with improvement in breathing and wheezing. Patient is stable today, with improved breathing and cough, able to return home today with supplemental oxygen. Imaging: XR Results (most recent): 
Results from Hospital Encounter encounter on 01/09/21 XR CHEST PORT Narrative Portable Frontal Chest. 
 
CLINICAL HISTORY: Shortness of breath. TECHNIQUE: Single frontal view of the chest, obtained portably. COMPARISONS: 1/8/2020. FINDINGS:  
 
  Mild haziness in the left midlung field. Mild septal thickening right lung 
base. . The cardiomediastinal silhouette is unremarkable. Pulmonary vascularity 
is normal, however. There are no effusions. Impression IMPRESSION: 
 
Likely mild edema. Pneumonitis less likely. Correlate clinically and consider 
follow-up. CT Results (most recent): 
Results from Hospital Encounter encounter on 10/11/20 CTA CHEST W OR W WO CONT Narrative CTA CHEST PULMONARY EMBOLISM PROTOCOL INDICATION: Heart failure, chronic kidney disease, cocaine abuse, oxygen 
dependence, hypertension, fibromyalgia, morbid obesity, STEMI with exertional 
chest and flank pain, pleuritic. . Question pulmonary embolism. TECHNIQUE: Thin collimation axial images obtained through the level of the 
pulmonary arteries with additional imaging through the chest following the 
uneventful administration of 100 mL Isovue-370 nonionic intravenous contrast.  
Images reconstructed into three dimensional coronal and sagittal projections for 
complete evaluation of the tortuous and overlapping pulmonary vascular 
structures and to reduce patient radiation dose. All CT scans at this facility are performed using dose optimization technique as 
appropriate to a performed exam, to include automated exposure control, 
adjustment of the mA and/or kV according to patient size (including appropriate 
matching first site-specific examinations), or use of iterative reconstruction 
technique. COMPARISON: 10/8/2020; 7/26/2020.  
 
FINDINGS: 
 
 No filling defects are appreciated within the main, left, right, lobar or 
visualized segmental pulmonary arteries to suggest embolism. Main pulmonary 
artery is 3.1 cm transverse diameter. Thyroid: Right lobe is 4 cm and the left is 3 cm. It is diffusely heterogeneous 
and likely nodular. Pericardium/ Heart: Left atrium appears dilated. Aorta/ Vessels: No aneurysm or dissection. Lymph Nodes: Unremarkable. Luiza Salen Lungs: Mild central bronchial wall thickening. Limited evaluation secondary to 
respiratory motion. Mild regions of subsegmental atelectasis or scarring lung 
bases. Pleura: No effusion. Upper Abdomen: Left lateral hepatic cysts measure up to 1.5 cm. Left adrenal 
thickening. Incompletely visualized at least 2.2 cm left upper pole renal cyst. 
 
Bones/soft tissues: Mild anasarca. No acute bony findings. Impression IMPRESSION: 
No evidence for pulmonary emboli. Mild bronchitis or central venous congestion. Main pulmonary artery is mildly enlarged as may be seen in pulmonary arterial 
hypertension. Left atrium appears enlarged. Thyromegaly and multinodular goiter. Mild anasarca. 10/19/20 ECHO ADULT FOLLOW-UP OR LIMITED 10/19/2020 10/19/2020 Narrative · LV: Estimated LVEF is 60 - 65%. Visually measured ejection fraction. Normal cavity size and systolic function (ejection fraction normal). Moderate concentric hypertrophy. Wall motion: normal. 
· PA: Pulmonary arterial systolic pressure is 25 mmHg. Signed by: Yamile Cervantes MD  
  
 
 
Procedures:  
None Discharge Medications:    
Current Discharge Medication List  
  
START taking these medications Details  
predniSONE (DELTASONE) 20 mg tablet Take 60 mg by mouth daily (with breakfast). Qty: 5 Tab, Refills: 0 CONTINUE these medications which have NOT CHANGED Details albuterol (PROVENTIL HFA, VENTOLIN HFA, PROAIR HFA) 90 mcg/actuation inhaler Take 2 Puffs by inhalation every four (4) hours as needed for Wheezing or Shortness of Breath. Indications: bronchospasm prevention 
Qty: 1 Inhaler, Refills: 4  
  
nitroglycerin (NITROSTAT) 0.4 mg SL tablet Take 1 Tab by mouth every five (5) minutes as needed for Chest Pain. Sit/Lay down then put one tab under the tongue every 5 minutes as needed for chest pain for 3 doses Qty: 1 Bottle, Refills: 0  
  
aspirin delayed-release 81 mg tablet Take 1 Tab by mouth daily. Qty: 30 Tab, Refills: 0  
  
amLODIPine (NORVASC) 10 mg tablet Take 1 Tab by mouth daily for 30 days. Indications: high blood pressure Qty: 30 Tab, Refills: 0  
  
lisinopriL (PriniviL) 20 mg tablet Take 1 Tab by mouth daily for 30 days. Qty: 30 Tab, Refills: 0  
  
!! cloNIDine HCL (CATAPRES) 0.2 mg tablet Take 1 Tab by mouth three (3) times daily for 30 days. Qty: 90 Tab, Refills: 0  
  
fluticasone furoate-vilanteroL (Breo Ellipta) 200-25 mcg/dose inhaler Take 1 Puff by inhalation daily. Indications: controller medication for asthma Qty: 1 Inhaler, Refills: 3  
  
montelukast (SINGULAIR) 10 mg tablet Take 1 Tab by mouth nightly. Indications: controller medication for asthma Qty: 30 Tab, Refills: 1  
  
roflumilast (DALIRESP) 500 mcg tab tablet Take 1 Tab by mouth daily. Qty: 30 Tab, Refills: 3  
  
albuterol (ACCUNEB) 1.25 mg/3 mL nebu Take 3 mL by inhalation every four (4) hours as needed for Wheezing (wheezing). Indications: bronchospasm prevention 
Qty: 25 Each, Refills: 5  
  
citalopram (CeleXA) 20 mg tablet Take 1 Tab by mouth every morning. Qty: 30 Tab, Refills: 0  
  
!! cloNIDine HCL (CATAPRES) 0.2 mg tablet Take 0.2 mg by mouth three (3) times daily. famotidine (PEPCID) 20 mg tablet Take 1 Tab by mouth daily. Qty: 30 Tab, Refills: 1 OXYGEN-AIR DELIVERY SYSTEMS 3 L by IntraNASal route as needed for Other (sob). atorvastatin (LIPITOR) 20 mg tablet Take 1 Tab by mouth nightly. Qty: 30 Tab, Refills: 0  
  
naloxone (NARCAN) 4 mg/actuation nasal spray Use 1 spray intranasally, then discard. Repeat with new spray every 2 min as needed for opioid overdose symptoms, alternating nostrils. Qty: 1 Each, Refills: 0  
  
 !! - Potential duplicate medications found. Please discuss with provider. Current Facility-Administered Medications:  
  albuterol-ipratropium (DUO-NEB) 2.5 MG-0.5 MG/3 ML, 3 mL, Nebulization, Q6H RT, Kelli Goel MD, 3 mL at 01/18/21 3188   predniSONE (DELTASONE) tablet 60 mg, 60 mg, Oral, DAILY WITH BREAKFAST, Yessenia Reyes MD, 60 mg at 01/18/21 5615   citalopram (CELEXA) tablet 20 mg, 20 mg, Oral, DAILY, Yessenia Reyes MD, 20 mg at 01/18/21 0944 
  guaiFENesin-codeine (ROBITUSSIN AC) 100-10 mg/5 mL solution 5 mL, 5 mL, Oral, Q4H PRN, Dexter Jensen MD, 5 mL at 01/17/21 0519 
  arformoteroL (BROVANA) neb solution 15 mcg, 15 mcg, Nebulization, BID RT, Ferna Drone, PA-C, 15 mcg at 01/18/21 8523   budesonide (PULMICORT) 500 mcg/2 ml nebulizer suspension, 1,000 mcg, Nebulization, BID RT, Ferna Drone, PA-C, 500 mcg at 01/18/21 0720 
  amLODIPine (NORVASC) tablet 10 mg, 10 mg, Oral, DAILY, Yessenia Reyes MD, 10 mg at 01/18/21 5961   aspirin delayed-release tablet 81 mg, 81 mg, Oral, DAILY, Yessenia Reyes MD, 81 mg at 01/18/21 0944 
  atorvastatin (LIPITOR) tablet 20 mg, 20 mg, Oral, QHS, Yessenia Reyes MD, 20 mg at 01/17/21 2152   famotidine (PEPCID) tablet 20 mg, 20 mg, Oral, DAILY, Yessenia Reyes MD, 20 mg at 01/18/21 6066   [Held by provider] lisinopriL (PRINIVIL, ZESTRIL) tablet 20 mg, 20 mg, Oral, DAILY, Yessenia Reyes MD, Stopped at 01/18/21 0900 
  montelukast (SINGULAIR) tablet 10 mg, 10 mg, Oral, QHS, Yessenia Reyes MD, 10 mg at 01/17/21 7991   acetaminophen (TYLENOL) tablet 650 mg, 650 mg, Oral, Q6H PRN **OR** acetaminophen (TYLENOL) suppository 650 mg, 650 mg, Rectal, Q6H PRN, Devin Hartmann MD 
  polyethylene glycol (MIRALAX) packet 17 g, 17 g, Oral, DAILY PRN, Devin Hartmann MD 
  heparin (porcine) injection 5,000 Units, 5,000 Units, SubCUTAneous, Q8H, Devin Hartmann MD, 5,000 Units at 01/18/21 0119 
  sodium chloride (NS) flush 5-40 mL, 5-40 mL, IntraVENous, Q8H, Devin Hartmann MD, 10 mL at 01/18/21 0527 
  sodium chloride (NS) flush 5-40 mL, 5-40 mL, IntraVENous, PRN, Devin Hartmann MD 
  albuterol (ACCUNEB) nebulizer solution 2.5 mg, 2.5 mg, Inhalation, Q4H PRN, Devin Hartmann MD, 2.5 mg at 01/11/21 1343 
  nitroglycerin (NITROSTAT) tablet 0.4 mg, 0.4 mg, SubLINGual, Q5MIN PRN, Devin Hartmann MD, 0.4 mg at 01/18/21 7973   cloNIDine HCL (CATAPRES) tablet 0.2 mg, 0.2 mg, Oral, BID, Scarlet Pinzon MD, 0.2 mg at 01/18/21 8241 · It is important that you take the medication exactly as they are prescribed. · Keep your medication in the bottles provided by the pharmacist and keep a list of the medication names, dosages, and times to be taken in your wallet. · Do not take other medications without consulting your doctor. Discharge To: Home Minutes spent on discharge: 50 minutes spent coordinating this discharge (review instructions/follow-up, prescriptions, preparing report for sign off) Nimo Hancock 1/18/2021 11:29 AM 
*ATTENTION:  This note has been created by a medical student for educational purposes only. Please do not refer to the content of this note for clinical decision-making, billing, or other purposes. Please see attending physicians note to obtain clinical information on this patient. *

## 2021-01-20 ENCOUNTER — TRANSCRIBE ORDER (OUTPATIENT)
Dept: SCHEDULING | Age: 56
End: 2021-01-20

## 2021-01-20 DIAGNOSIS — Z12.31 VISIT FOR SCREENING MAMMOGRAM: Primary | ICD-10-CM

## 2021-01-22 LAB
6-ACETYLMORPHINE: NEGATIVE
CODEINE, 737848: 13.5 NG/ML
DIHYDROCODEINE, 764159: NEGATIVE NG/ML
HYDROCODONE, 737854: NEGATIVE NG/ML
HYDROMORPHONE, 737852: NEGATIVE NG/ML
Lab: POSITIVE
METHADONE, 071969: 50 NG/ML
MORPHINE, 737850: 2.5 NG/ML
OPIATE CONFIRMATION,OPIC: POSITIVE
OXYCOCONE: NEGATIVE NG/ML
OXYCODONES CONFIRMATION, 738393: NEGATIVE
OXYMORPHONE, 763902: NEGATIVE NG/ML

## 2021-01-23 LAB
7-AMINOCLONAZEPAM: NEGATIVE NG/ML
ALPRAZOLAM, 763914: 4 NG/ML
AMPHETAMINES SERPL QL SCN: NEGATIVE NG/ML
BARBITURATES SERPL QL SCN: NEGATIVE UG/ML
BENZODIAZ SERPL QL SCN: ABNORMAL NG/ML
BENZODIAZEPINES CONFIRM: POSITIVE
CANNABINOIDS SERPL QL SCN: NEGATIVE NG/ML
CHLORDIAZEPOXIDE, 716035: NEGATIVE NG/ML
CLONAZEPAM, 763916: NEGATIVE NG/ML
COCAINE+BZE SERPL QL SCN: NEGATIVE NG/ML
DESALKYLFLURAZEPAM, 767601: NEGATIVE NG/ML
DESMETHYLCHLORDIAZEPOXIDE, 810910: NEGATIVE NG/ML
DESMETHYLDIAZEPAM, RMBN16: NEGATIVE NG/ML
DIAZEPAM, 810905: NEGATIVE NG/ML
FLURAZEPAM, 790417: NEGATIVE NG/ML
LORAZEPAM, 763912: NEGATIVE NG/ML
MEPERIDINE, DR296L: NEGATIVE NG/ML
METHADONE SERPL QL SCN: ABNORMAL NG/ML
MIDAZOLAM, 763922: NEGATIVE NG/ML
OPIATES SERPL QL SCN: ABNORMAL NG/ML
OPIATES, 790423: NEGATIVE NG/ML
OXAZEPAM CONFIRM, 763908: NEGATIVE NG/ML
OXYCODONE, 790407: NEGATIVE NG/ML
PCP SERPL QL SCN: NEGATIVE NG/ML
PROPOXYPH SERPL QL SCN: NEGATIVE NG/ML
TEMAZEPAM, 763918: NEGATIVE NG/ML
TRAMADOL, DR297L: NEGATIVE NG/ML
TRIAZOLAM, 763920: NEGATIVE NG/ML

## 2021-01-25 ENCOUNTER — APPOINTMENT (OUTPATIENT)
Dept: GENERAL RADIOLOGY | Age: 56
End: 2021-01-25
Attending: PHYSICIAN ASSISTANT
Payer: MEDICAID

## 2021-01-25 ENCOUNTER — HOSPITAL ENCOUNTER (EMERGENCY)
Age: 56
Discharge: HOME | End: 2021-01-26
Attending: EMERGENCY MEDICINE
Payer: MEDICAID

## 2021-01-25 VITALS
RESPIRATION RATE: 22 BRPM | TEMPERATURE: 98.2 F | OXYGEN SATURATION: 97 % | HEART RATE: 78 BPM | SYSTOLIC BLOOD PRESSURE: 133 MMHG | DIASTOLIC BLOOD PRESSURE: 68 MMHG

## 2021-01-25 DIAGNOSIS — F41.1 ANXIETY STATE: Primary | ICD-10-CM

## 2021-01-25 DIAGNOSIS — R06.02 SOB (SHORTNESS OF BREATH): ICD-10-CM

## 2021-01-25 DIAGNOSIS — R60.0 BILATERAL LEG EDEMA: ICD-10-CM

## 2021-01-25 DIAGNOSIS — R07.9 CHEST PAIN, UNSPECIFIED TYPE: ICD-10-CM

## 2021-01-25 LAB
BASOPHILS # BLD: 0 K/UL (ref 0–0.1)
BASOPHILS NFR BLD: 0 % (ref 0–2)
DIFFERENTIAL METHOD BLD: ABNORMAL
EOSINOPHIL # BLD: 0 K/UL (ref 0–0.4)
EOSINOPHIL NFR BLD: 0 % (ref 0–5)
ERYTHROCYTE [DISTWIDTH] IN BLOOD BY AUTOMATED COUNT: 13.6 % (ref 11.6–14.5)
HCT VFR BLD AUTO: 35.9 % (ref 35–45)
HGB BLD-MCNC: 11.6 G/DL (ref 12–16)
INR PPP: 1 (ref 0.8–1.2)
LYMPHOCYTES # BLD: 1.4 K/UL (ref 0.9–3.6)
LYMPHOCYTES NFR BLD: 11 % (ref 21–52)
MCH RBC QN AUTO: 28.6 PG (ref 24–34)
MCHC RBC AUTO-ENTMCNC: 32.3 G/DL (ref 31–37)
MCV RBC AUTO: 88.6 FL (ref 74–97)
MONOCYTES # BLD: 0.6 K/UL (ref 0.05–1.2)
MONOCYTES NFR BLD: 5 % (ref 3–10)
NEUTS SEG # BLD: 10.2 K/UL (ref 1.8–8)
NEUTS SEG NFR BLD: 84 % (ref 40–73)
PLATELET # BLD AUTO: 202 K/UL (ref 135–420)
PMV BLD AUTO: 10.4 FL (ref 9.2–11.8)
PROTHROMBIN TIME: 13.1 SEC (ref 11.5–15.2)
RBC # BLD AUTO: 4.05 M/UL (ref 4.2–5.3)
WBC # BLD AUTO: 12.2 K/UL (ref 4.6–13.2)

## 2021-01-25 PROCEDURE — 74011250636 HC RX REV CODE- 250/636: Performed by: PHYSICIAN ASSISTANT

## 2021-01-25 PROCEDURE — 96374 THER/PROPH/DIAG INJ IV PUSH: CPT

## 2021-01-25 PROCEDURE — 93005 ELECTROCARDIOGRAM TRACING: CPT

## 2021-01-25 PROCEDURE — 71046 X-RAY EXAM CHEST 2 VIEWS: CPT

## 2021-01-25 PROCEDURE — 94640 AIRWAY INHALATION TREATMENT: CPT

## 2021-01-25 PROCEDURE — 85610 PROTHROMBIN TIME: CPT

## 2021-01-25 PROCEDURE — 85025 COMPLETE CBC W/AUTO DIFF WBC: CPT

## 2021-01-25 PROCEDURE — 99284 EMERGENCY DEPT VISIT MOD MDM: CPT

## 2021-01-25 RX ORDER — FUROSEMIDE 10 MG/ML
40 INJECTION INTRAMUSCULAR; INTRAVENOUS
Status: COMPLETED | OUTPATIENT
Start: 2021-01-25 | End: 2021-01-25

## 2021-01-25 RX ORDER — OXYCODONE AND ACETAMINOPHEN 5; 325 MG/1; MG/1
1 TABLET ORAL
Status: COMPLETED | OUTPATIENT
Start: 2021-01-25 | End: 2021-01-26

## 2021-01-25 RX ADMIN — FUROSEMIDE 40 MG: 10 INJECTION, SOLUTION INTRAMUSCULAR; INTRAVENOUS at 23:08

## 2021-01-25 NOTE — ED NOTES
I performed a brief evaluation, including history and physical, of the patient here in triage and I have determined that pt will need further treatment and evaluation from the main side ER physician. I have placed initial orders to help in expediting patients care.      January 25, 2021 at 4:59 PM - [facility] Draft[facility], [id number]

## 2021-01-26 LAB
ANION GAP SERPL CALC-SCNC: 5 MMOL/L (ref 3–18)
ATRIAL RATE: 75 BPM
BNP SERPL-MCNC: 182 PG/ML (ref 0–900)
BUN SERPL-MCNC: 17 MG/DL (ref 7–18)
BUN/CREAT SERPL: 19 (ref 12–20)
CALCIUM SERPL-MCNC: 8.4 MG/DL (ref 8.5–10.1)
CALCULATED P AXIS, ECG09: 69 DEGREES
CALCULATED R AXIS, ECG10: 68 DEGREES
CALCULATED T AXIS, ECG11: 64 DEGREES
CHLORIDE SERPL-SCNC: 103 MMOL/L (ref 100–111)
CK MB CFR SERPL CALC: 2.8 % (ref 0–4)
CK MB SERPL-MCNC: 1.2 NG/ML (ref 5–25)
CK SERPL-CCNC: 43 U/L (ref 26–192)
CO2 SERPL-SCNC: 31 MMOL/L (ref 21–32)
CREAT SERPL-MCNC: 0.88 MG/DL (ref 0.6–1.3)
DIAGNOSIS, 93000: NORMAL
GLUCOSE SERPL-MCNC: 99 MG/DL (ref 74–99)
P-R INTERVAL, ECG05: 118 MS
POTASSIUM SERPL-SCNC: 3.6 MMOL/L (ref 3.5–5.5)
Q-T INTERVAL, ECG07: 372 MS
QRS DURATION, ECG06: 82 MS
QTC CALCULATION (BEZET), ECG08: 415 MS
SODIUM SERPL-SCNC: 139 MMOL/L (ref 136–145)
TROPONIN I SERPL-MCNC: <0.02 NG/ML (ref 0–0.04)
VENTRICULAR RATE, ECG03: 75 BPM

## 2021-01-26 PROCEDURE — 82553 CREATINE MB FRACTION: CPT

## 2021-01-26 PROCEDURE — 83880 ASSAY OF NATRIURETIC PEPTIDE: CPT

## 2021-01-26 PROCEDURE — 74011250637 HC RX REV CODE- 250/637: Performed by: PHYSICIAN ASSISTANT

## 2021-01-26 PROCEDURE — 80048 BASIC METABOLIC PNL TOTAL CA: CPT

## 2021-01-26 RX ORDER — ALBUTEROL SULFATE 90 UG/1
2 AEROSOL, METERED RESPIRATORY (INHALATION)
Status: COMPLETED | OUTPATIENT
Start: 2021-01-26 | End: 2021-01-26

## 2021-01-26 RX ORDER — ALPRAZOLAM 1 MG/1
1 TABLET ORAL
Qty: 8 TAB | Refills: 0 | Status: SHIPPED | OUTPATIENT
Start: 2021-01-26 | End: 2021-03-01

## 2021-01-26 RX ORDER — FUROSEMIDE 20 MG/1
20 TABLET ORAL DAILY
Qty: 15 TAB | Refills: 0 | OUTPATIENT
Start: 2021-01-26 | End: 2021-02-17

## 2021-01-26 RX ADMIN — OXYCODONE AND ACETAMINOPHEN 1 TABLET: 5; 325 TABLET ORAL at 00:07

## 2021-01-26 RX ADMIN — ALBUTEROL SULFATE 2 PUFF: 108 INHALANT RESPIRATORY (INHALATION) at 01:36

## 2021-01-26 NOTE — DISCHARGE INSTRUCTIONS
[facility] Activation    Thank you for requesting access to [employer]. Please follow the instructions below to securely access and download your online medical record. [facility] [facility] allows you to send messages to your doctor, view your test results, renew your prescriptions, schedule appointments, and more. How Do I Sign Up? In your internet browser, go to [email or web address]  Click on the First Time User? Click Here link in the Sign In box. You will be redirect to the New Member Sign Up page. Enter your [employer] Access Code exactly as it appears below. You will not need to use this code after youve completed the sign-up process. If you do not sign up before the expiration date, you must request a new code. [facility] [facility] Access Code: [unfilled] (This is the date your [facility] access code will )    Enter the last four digits of your Social Security Number (xxxx) and Date of Birth (mm/dd/yyyy) as indicated and click Submit. You will be taken to the next sign-up page. Create a [facility] [facility] ID. This will be your [facility] login ID and cannot be changed, so think of one that is secure and easy to remember. Create a [facility] [facility] password. You can change your password at any time. Enter your Password Reset Question and Answer. This can be used at a later time if you forget your password. Enter your e-mail address. You will receive e-mail notification when new information is available in [address] Click Sign Up. You can now view and download portions of your medical record. Click the [place] link to download a portable copy of your medical information. Additional Information    If you have questions, please visit the Frequently Asked Questions section of the [employer] website at [email or web address] Remember, [employer] is NOT to be used for urgent needs. For medical emergencies, dial 911.

## 2021-01-26 NOTE — ED PROVIDER NOTES
EMERGENCY DEPARTMENT HISTORY AND PHYSICAL EXAM    Date: 1/25/2021  Patient Name: Cindy Selby    History of Presenting Illness     Chief Complaint   Patient presents with    Chest Pain         History Provided By: patient     Chief Complaint: chest pain and SOB  Duration: since 3 pm today  Timing:  Acute on chronic   Location: chest  Quality: tightness  Severity: moderate  Modifying Factors: took ASA and nitro en route with some improvement   Associated Symptoms: leg swelling x 4-5 days      Additional History (Context): Cindy Selby is a 63 y.o. female well known to the ED with PMH CHF, htn, hypercholesterolemia, COPD, polysubstance abuse, and NSTEMI who presents with c/o chest pain and SOB onset 3 pm today while getting groceries. Pt denies cough/cold sx. Denies any concerns for COVID. Pt was given 1 NTG and 324 ASA en route to the ED. Pt was recently admitted to the hospital for COPD exacerbation. She states she developed BLE edema in the hospital and was treated with lasix. Pt states she is not on lasix at home. Denies having a cardiologist. No other complaints reported at this time. PCP: Nahed Grier MD    Current Facility-Administered Medications   Medication Dose Route Frequency Provider Last Rate Last Admin    albuterol (PROVENTIL HFA, VENTOLIN HFA, PROAIR HFA) inhaler 2 Puff  2 Puff Inhalation NOW Vangie Edwards PA-C         Current Outpatient Medications   Medication Sig Dispense Refill    furosemide (Lasix) 20 mg tablet Take 1 Tab by mouth daily. 15 Tab 0    predniSONE (DELTASONE) 20 mg tablet Take 60 mg by mouth daily (with breakfast). 5 Tab 0    fluticasone furoate-vilanteroL (Breo Ellipta) 200-25 mcg/dose inhaler Take 1 Puff by inhalation daily.  Indications: controller medication for asthma 1 Inhaler 3    albuterol (PROVENTIL HFA, VENTOLIN HFA, PROAIR HFA) 90 mcg/actuation inhaler Take 2 Puffs by inhalation every four (4) hours as needed for Wheezing or Shortness of Breath. Indications: bronchospasm prevention 1 Inhaler 0    nitroglycerin (NITROSTAT) 0.4 mg SL tablet Take 1 Tab by mouth every five (5) minutes as needed for Chest Pain. Sit/Lay down then put one tab under the tongue every 5 minutes as needed for chest pain for 3 doses 1 Bottle 0    aspirin delayed-release 81 mg tablet Take 1 Tab by mouth daily. 30 Tab 0    amLODIPine (NORVASC) 10 mg tablet Take 1 Tab by mouth daily for 30 days. Indications: high blood pressure 30 Tab 0    lisinopriL (PriniviL) 20 mg tablet Take 1 Tab by mouth daily for 30 days. 30 Tab 0    cloNIDine HCL (CATAPRES) 0.2 mg tablet Take 1 Tab by mouth three (3) times daily for 30 days. 90 Tab 0    montelukast (SINGULAIR) 10 mg tablet Take 1 Tab by mouth nightly. Indications: controller medication for asthma 30 Tab 1    roflumilast (DALIRESP) 500 mcg tab tablet Take 1 Tab by mouth daily. 30 Tab 3    albuterol (ACCUNEB) 1.25 mg/3 mL nebu Take 3 mL by inhalation every four (4) hours as needed for Wheezing (wheezing). Indications: bronchospasm prevention 25 Each 5    citalopram (CeleXA) 20 mg tablet Take 1 Tab by mouth every morning. 30 Tab 0    cloNIDine HCL (CATAPRES) 0.2 mg tablet Take 0.2 mg by mouth three (3) times daily.  famotidine (PEPCID) 20 mg tablet Take 1 Tab by mouth daily. (Patient taking differently: Take 20 mg by mouth daily as needed.) 30 Tab 1    OXYGEN-AIR DELIVERY SYSTEMS 3 L by IntraNASal route as needed for Other (sob).  atorvastatin (LIPITOR) 20 mg tablet Take 1 Tab by mouth nightly. 30 Tab 0    naloxone (NARCAN) 4 mg/actuation nasal spray Use 1 spray intranasally, then discard. Repeat with new spray every 2 min as needed for opioid overdose symptoms, alternating nostrils.  1 Each 0       Past History     Past Medical History:  Past Medical History:   Diagnosis Date    CHF (congestive heart failure) (HCC)     Chronic respiratory failure with hypoxia ([facility] [address] 9/7/2020    CKD (chronic kidney disease) stage 3, GFR 30-59 ml/min 10/31/2019    Cocaine abuse ([address] 11/26/2017    COPD (chronic obstructive pulmonary disease) with chronic bronchitis ([address] 12/19/2017    Dependence on supplemental oxygen     Depression 3/22/2020    Drug-seeking behavior 11/19/2016    Endocrine disease     thyroid issues    Essential hypertension 3/10/2017    Fe deficiency anemia 10/27/2012    Fibromyalgia 12/16/2016    Gastroesophageal reflux disease without esophagitis 10/10/2016    Gastrointestinal disorder     \"blockage in my stomach\"    Hypercholesterolemia     Hypertension     Hypertensive heart failure ([address] 12/19/2017    Morbid obesity due to excess calories ([address] 3/10/2017    NSTEMI (non-ST elevated myocardial infarction) ([address] 3/22/2020    On home O2 12/3/2015    Overview:  2L at home per pt for approx 8 years    Polysubstance abuse ([address] 9/15/2018    Respiratory failure ([address] 1/11/2017    S/P hernia repair 10/31/2019    Sleep apnea 12/19/2017    T wave inversion in EKG 3/9/2019    Tobacco use 7/15/7205    Uncomplicated severe persistent asthma 12/13/2016    Vocal cord disease 12/7/2016       Past Surgical History:  Past Surgical History:   Procedure Laterality Date    HX GI      Exploratory laparotomy with lysis of adhesions and primary repair of incarcerated umbilical hernia       Family History:  No family history on file. Social History:  Social History     Tobacco Use    Smoking status: Former Smoker     Packs/day: 0.25    Smokeless tobacco: Current User    Tobacco comment: Pt states she has not had money to buy cigarettes in the past month   Substance Use Topics    Alcohol use: No     Comment: socially    Drug use: Not Currently     Types: Heroin     Comment: pt reports using approximately a month ago       Allergies:  No Known Allergies      Review of Systems   Review of Systems   Constitutional: Negative. Negative for chills and fever. HENT: Negative.   Negative for congestion, ear pain and rhinorrhea. Eyes: Negative. Negative for pain and redness. Respiratory: Positive for shortness of breath. Negative for cough, wheezing and stridor. Cardiovascular: Positive for chest pain. Negative for leg swelling. Gastrointestinal: Negative. Negative for abdominal pain, constipation, diarrhea, nausea and vomiting. Genitourinary: Negative. Negative for dysuria and frequency. Musculoskeletal: Negative. Negative for back pain and neck pain. Skin: Negative. Negative for rash and wound. Neurological: Negative. Negative for dizziness, seizures, syncope and headaches. All other systems reviewed and are negative. All Other Systems Negative  Physical Exam     Vitals:    01/25/21 1659   BP: 133/68   Pulse: 78   Resp: 22   Temp: 98.2 °F (36.8 °C)   SpO2: 97%     Physical Exam  Vitals signs and nursing note reviewed. Constitutional:       General: She is not in acute distress. Appearance: She is well-developed. She is not diaphoretic. HENT:      Head: Normocephalic and atraumatic. Eyes:      General: No scleral icterus. Right eye: No discharge. Left eye: No discharge. Conjunctiva/sclera: Conjunctivae normal.   Neck:      Musculoskeletal: Normal range of motion and neck supple. Cardiovascular:      Rate and Rhythm: Normal rate and regular rhythm. Heart sounds: Normal heart sounds. No murmur. No friction rub. No gallop. Pulmonary:      Effort: Pulmonary effort is normal. No respiratory distress. Breath sounds: Normal breath sounds. No stridor. No wheezing, rhonchi or rales. Musculoskeletal: Normal range of motion. Right lower leg: Edema present. Left lower leg: Edema present. Comments: 2 + pitting edema to the BLE    Skin:     General: Skin is warm and dry. Findings: No erythema or rash. Neurological:      Mental Status: She is alert and oriented to person, place, and time.       Coordination: Coordination normal.      Comments: Gait is steady and patient exhibits no evidence of ataxia. Patient is able to ambulate without difficulty. No focal neurological deficit noted. No facial droop, slurred speech, or evidence of altered mentation noted on exam.     Psychiatric:         Behavior: Behavior normal.         Thought Content: Thought content normal.          **      Diagnostic Study Results     Labs -     Recent Results (from the past 12 hour(s))   EKG, 12 LEAD, INITIAL    Collection Time: 01/25/21  5:50 PM   Result Value Ref Range    Ventricular Rate 75 BPM    Atrial Rate 75 BPM    P-R Interval 118 ms    QRS Duration 82 ms    Q-T Interval 372 ms    QTC Calculation (Bezet) 415 ms    Calculated P Axis 69 degrees    Calculated R Axis 68 degrees    Calculated T Axis 64 degrees    Diagnosis       Normal sinus rhythm  Possible Left atrial enlargement  Left ventricular hypertrophy  Nonspecific T wave abnormality  Abnormal ECG  When compared with ECG of 16-JAN-2021 19:43,  Vent. rate has decreased BY  39 BPM     CBC WITH AUTOMATED DIFF    Collection Time: 01/25/21  8:30 PM   Result Value Ref Range    WBC 12.2 4.6 - 13.2 K/uL    RBC 4.05 (L) 4.20 - 5.30 M/uL    HGB 11.6 (L) 12.0 - 16.0 g/dL    HCT 35.9 35.0 - 45.0 %    MCV 88.6 74.0 - 97.0 FL    MCH 28.6 24.0 - 34.0 PG    MCHC 32.3 31.0 - 37.0 g/dL    RDW 13.6 11.6 - 14.5 %    PLATELET 945 985 - 118 K/uL    MPV 10.4 9.2 - 11.8 FL    NEUTROPHILS 84 (H) 40 - 73 %    LYMPHOCYTES 11 (L) 21 - 52 %    MONOCYTES 5 3 - 10 %    EOSINOPHILS 0 0 - 5 %    BASOPHILS 0 0 - 2 %    ABS. NEUTROPHILS 10.2 (H) 1.8 - 8.0 K/UL    ABS. LYMPHOCYTES 1.4 0.9 - 3.6 K/UL    ABS. MONOCYTES 0.6 0.05 - 1.2 K/UL    ABS. EOSINOPHILS 0.0 0.0 - 0.4 K/UL    ABS.  BASOPHILS 0.0 0.0 - 0.1 K/UL    DF AUTOMATED     PROTHROMBIN TIME + INR    Collection Time: 01/25/21  8:30 PM   Result Value Ref Range    Prothrombin time 13.1 11.5 - 15.2 sec    INR 1.0 0.8 - 1.2     METABOLIC PANEL, BASIC    Collection Time: 01/26/21 12:22 AM   Result Value Ref Range    Sodium 139 136 - 145 mmol/L    Potassium 3.6 3.5 - 5.5 mmol/L    Chloride 103 100 - 111 mmol/L    CO2 31 21 - 32 mmol/L    Anion gap 5 3.0 - 18 mmol/L    Glucose 99 74 - 99 mg/dL    BUN 17 7.0 - 18 MG/DL    Creatinine 0.88 0.6 - 1.3 MG/DL    BUN/Creatinine ratio 19 12 - 20      GFR est AA >60 >60 ml/min/1.73m2    GFR est non-AA >60 >60 ml/min/1.73m2    Calcium 8.4 (L) 8.5 - 10.1 MG/DL   CARDIAC PANEL,(CK, CKMB & TROPONIN)    Collection Time: 01/26/21 12:22 AM   Result Value Ref Range    CK - MB 1.2 <3.6 ng/ml    CK-MB Index 2.8 0.0 - 4.0 %    CK 43 26 - 192 U/L    Troponin-I, QT <0.02 0.0 - 0.045 NG/ML   NT-PRO BNP    Collection Time: 01/26/21 12:22 AM   Result Value Ref Range    NT pro- 0 - 900 PG/ML       Radiologic Studies -   XR CHEST PA LAT   Final Result      Focal bandlike density density adjacent to the cardiac apex. Suggestive of   atelectatic change vs. infiltrate. CT Results  (Last 48 hours)    None        CXR Results  (Last 48 hours)               01/25/21 1730  XR CHEST PA LAT Final result    Impression:      Focal bandlike density density adjacent to the cardiac apex. Suggestive of   atelectatic change vs. infiltrate. Narrative:  EXAM:  Chest Frontal and Lateral.       INDICATION:  Shortness of breath. COMPARISON:  01/09/21. TECHNIQUE:  PA and lateral views. FINDINGS:        - Left lower lung zone subtle peripheral bandlike density adjacent to the   cardiac apex. - No infiltrate or consolidation in the remainder of the lungs. - No pleural effusion or pneumothorax is detected. - Cardiac silhouette, mediastinum and hilar regions are unremarkable. Medical Decision Making   I am the first provider for this patient. I reviewed the vital signs, available nursing notes, past medical history, past surgical history, family history and social history.     Vital Signs-Reviewed the patient's vital signs. Records Reviewed: Vangie Edwards PA-C     Procedures:  Procedures    Provider Notes (Medical Decision Making): Impression:  Chest pain, SOB    IV inserted, lasix ordered    EKG normal sinus rhythm, no STEMI or acute ST changes, reviewed by myself and ED attending Dr. Pio Ptael  Chest x-ray negative for definite acute cardiopulmonary process     Labs CBC, PT/INR unremarkable. Remaining labs hemolyzed. Tech made aware of the need for redraw. Vangie Edwards PA-C      1:43 AM BMP, troponin, bnp negative. Consulted with ED attending Dr. Elliot Mckay. Recommendation is to d/c home with daily lasix and order for outpatient echocardiogram given her worsening leg swelling. Pt agrees with this plan. Case management order placed as well to assist the pt in establishing cardiology follow-up. Vangie Edwards PA-C     MED RECONCILIATION:  Current Facility-Administered Medications   Medication Dose Route Frequency    albuterol (PROVENTIL HFA, VENTOLIN HFA, PROAIR HFA) inhaler 2 Puff  2 Puff Inhalation NOW     Current Outpatient Medications   Medication Sig    furosemide (Lasix) 20 mg tablet Take 1 Tab by mouth daily.  predniSONE (DELTASONE) 20 mg tablet Take 60 mg by mouth daily (with breakfast).  fluticasone furoate-vilanteroL (Breo Ellipta) 200-25 mcg/dose inhaler Take 1 Puff by inhalation daily. Indications: controller medication for asthma    albuterol (PROVENTIL HFA, VENTOLIN HFA, PROAIR HFA) 90 mcg/actuation inhaler Take 2 Puffs by inhalation every four (4) hours as needed for Wheezing or Shortness of Breath. Indications: bronchospasm prevention    nitroglycerin (NITROSTAT) 0.4 mg SL tablet Take 1 Tab by mouth every five (5) minutes as needed for Chest Pain. Sit/Lay down then put one tab under the tongue every 5 minutes as needed for chest pain for 3 doses    aspirin delayed-release 81 mg tablet Take 1 Tab by mouth daily.     amLODIPine (NORVASC) 10 mg tablet Take 1 Tab by mouth daily for 30 days. Indications: high blood pressure    lisinopriL (PriniviL) 20 mg tablet Take 1 Tab by mouth daily for 30 days.  cloNIDine HCL (CATAPRES) 0.2 mg tablet Take 1 Tab by mouth three (3) times daily for 30 days.  montelukast (SINGULAIR) 10 mg tablet Take 1 Tab by mouth nightly. Indications: controller medication for asthma    roflumilast (DALIRESP) 500 mcg tab tablet Take 1 Tab by mouth daily.  albuterol (ACCUNEB) 1.25 mg/3 mL nebu Take 3 mL by inhalation every four (4) hours as needed for Wheezing (wheezing). Indications: bronchospasm prevention    citalopram (CeleXA) 20 mg tablet Take 1 Tab by mouth every morning.  cloNIDine HCL (CATAPRES) 0.2 mg tablet Take 0.2 mg by mouth three (3) times daily.  famotidine (PEPCID) 20 mg tablet Take 1 Tab by mouth daily. (Patient taking differently: Take 20 mg by mouth daily as needed.)    OXYGEN-AIR DELIVERY SYSTEMS 3 L by IntraNASal route as needed for Other (sob).  atorvastatin (LIPITOR) 20 mg tablet Take 1 Tab by mouth nightly.  naloxone (NARCAN) 4 mg/actuation nasal spray Use 1 spray intranasally, then discard. Repeat with new spray every 2 min as needed for opioid overdose symptoms, alternating nostrils. Disposition:  D/c    DISCHARGE NOTE:   Patient is stable for discharge at this time. I have discussed all the findings from today's work up with the patient, including lab results and imaging. I have answered all questions. Rx for lasix and echocardiogram order given. Rest and close follow-up with the PCP/cardiologist recommended this week. Return to the ED immediately for any new or worsening symptoms.  Vangie Alberts PA-C     Follow-up Information     Follow up With Specialties Details Why Contact Info    Jamar Goldman MD Internal Medicine In 1 week  [address] [place] [facility] EMERGENCY DEPT Emergency Medicine  As needed, If symptoms worsen [address] [place] [address]  Dionisio Mak MD Internal Medicine In 1 week  [address] [facility] [place] [facility] EMERGENCY DEPT Emergency Medicine  As needed, If symptoms worsen [address] [place] [id number]  [phone]          Current Discharge Medication List      START taking these medications    Details   furosemide (Lasix) 20 mg tablet Take 1 Tab by mouth daily. Qty: 15 Tab, Refills: 0               Diagnosis     Clinical Impression:   1. Chest pain, unspecified type    2. SOB (shortness of breath)    3.  Bilateral leg edema

## 2021-01-26 NOTE — ED NOTES
This RN has attempted several times to gain IV access on this pt and has been unsuccessful. Will get another nurse or tech to attempt.

## 2021-01-29 ENCOUNTER — APPOINTMENT (OUTPATIENT)
Dept: GENERAL RADIOLOGY | Age: 56
End: 2021-01-29
Attending: EMERGENCY MEDICINE
Payer: MEDICAID

## 2021-01-29 ENCOUNTER — HOSPITAL ENCOUNTER (EMERGENCY)
Age: 56
Discharge: HOME | End: 2021-01-29
Attending: EMERGENCY MEDICINE
Payer: MEDICAID

## 2021-01-29 ENCOUNTER — APPOINTMENT (OUTPATIENT)
Dept: VASCULAR SURGERY | Age: 56
End: 2021-01-29
Attending: EMERGENCY MEDICINE
Payer: MEDICAID

## 2021-01-29 VITALS
SYSTOLIC BLOOD PRESSURE: 133 MMHG | DIASTOLIC BLOOD PRESSURE: 56 MMHG | HEART RATE: 96 BPM | HEIGHT: 60 IN | WEIGHT: 159 LBS | RESPIRATION RATE: 26 BRPM | BODY MASS INDEX: 31.22 KG/M2 | OXYGEN SATURATION: 90 % | TEMPERATURE: 97 F

## 2021-01-29 DIAGNOSIS — R06.02 SOB (SHORTNESS OF BREATH): ICD-10-CM

## 2021-01-29 DIAGNOSIS — R60.0 BILATERAL LEG EDEMA: Primary | ICD-10-CM

## 2021-01-29 LAB
ALBUMIN SERPL-MCNC: 3 G/DL (ref 3.4–5)
ALBUMIN/GLOB SERPL: 0.7 {RATIO} (ref 0.8–1.7)
ALP SERPL-CCNC: 76 U/L (ref 45–117)
ALT SERPL-CCNC: 26 U/L (ref 13–56)
ANION GAP SERPL CALC-SCNC: 8 MMOL/L (ref 3–18)
AST SERPL-CCNC: 20 U/L (ref 10–38)
ATRIAL RATE: 92 BPM
BASOPHILS # BLD: 0 K/UL (ref 0–0.1)
BASOPHILS NFR BLD: 0 % (ref 0–2)
BILIRUB SERPL-MCNC: 0.4 MG/DL (ref 0.2–1)
BNP SERPL-MCNC: 354 PG/ML (ref 0–900)
BUN SERPL-MCNC: 24 MG/DL (ref 7–18)
BUN/CREAT SERPL: 21 (ref 12–20)
CALCIUM SERPL-MCNC: 8.7 MG/DL (ref 8.5–10.1)
CALCULATED P AXIS, ECG09: 78 DEGREES
CALCULATED R AXIS, ECG10: 50 DEGREES
CALCULATED T AXIS, ECG11: 48 DEGREES
CHLORIDE SERPL-SCNC: 104 MMOL/L (ref 100–111)
CK MB CFR SERPL CALC: NORMAL % (ref 0–4)
CK MB SERPL-MCNC: <1 NG/ML (ref 5–25)
CK SERPL-CCNC: 57 U/L (ref 26–192)
CO2 SERPL-SCNC: 33 MMOL/L (ref 21–32)
CREAT SERPL-MCNC: 1.12 MG/DL (ref 0.6–1.3)
DIAGNOSIS, 93000: NORMAL
DIFFERENTIAL METHOD BLD: ABNORMAL
EOSINOPHIL # BLD: 0 K/UL (ref 0–0.4)
EOSINOPHIL NFR BLD: 0 % (ref 0–5)
ERYTHROCYTE [DISTWIDTH] IN BLOOD BY AUTOMATED COUNT: 13.4 % (ref 11.6–14.5)
GLOBULIN SER CALC-MCNC: 4.3 G/DL (ref 2–4)
GLUCOSE SERPL-MCNC: 89 MG/DL (ref 74–99)
HCT VFR BLD AUTO: 35.8 % (ref 35–45)
HGB BLD-MCNC: 11.6 G/DL (ref 12–16)
LYMPHOCYTES # BLD: 0.9 K/UL (ref 0.9–3.6)
LYMPHOCYTES NFR BLD: 9 % (ref 21–52)
MCH RBC QN AUTO: 28.9 PG (ref 24–34)
MCHC RBC AUTO-ENTMCNC: 32.4 G/DL (ref 31–37)
MCV RBC AUTO: 89.1 FL (ref 74–97)
MONOCYTES # BLD: 0.3 K/UL (ref 0.05–1.2)
MONOCYTES NFR BLD: 3 % (ref 3–10)
NEUTS SEG # BLD: 8.8 K/UL (ref 1.8–8)
NEUTS SEG NFR BLD: 88 % (ref 40–73)
P-R INTERVAL, ECG05: 118 MS
PLATELET # BLD AUTO: 143 K/UL (ref 135–420)
PMV BLD AUTO: 10.5 FL (ref 9.2–11.8)
POTASSIUM SERPL-SCNC: 3.5 MMOL/L (ref 3.5–5.5)
PROT SERPL-MCNC: 7.3 G/DL (ref 6.4–8.2)
Q-T INTERVAL, ECG07: 342 MS
QRS DURATION, ECG06: 86 MS
QTC CALCULATION (BEZET), ECG08: 422 MS
RBC # BLD AUTO: 4.02 M/UL (ref 4.2–5.3)
SODIUM SERPL-SCNC: 145 MMOL/L (ref 136–145)
TROPONIN I SERPL-MCNC: <0.02 NG/ML (ref 0–0.04)
VENTRICULAR RATE, ECG03: 92 BPM
WBC # BLD AUTO: 10 K/UL (ref 4.6–13.2)

## 2021-01-29 PROCEDURE — 93970 EXTREMITY STUDY: CPT

## 2021-01-29 PROCEDURE — 82553 CREATINE MB FRACTION: CPT

## 2021-01-29 PROCEDURE — 71045 X-RAY EXAM CHEST 1 VIEW: CPT

## 2021-01-29 PROCEDURE — 74011250637 HC RX REV CODE- 250/637: Performed by: EMERGENCY MEDICINE

## 2021-01-29 PROCEDURE — 85025 COMPLETE CBC W/AUTO DIFF WBC: CPT

## 2021-01-29 PROCEDURE — 80053 COMPREHEN METABOLIC PANEL: CPT

## 2021-01-29 PROCEDURE — 93005 ELECTROCARDIOGRAM TRACING: CPT

## 2021-01-29 PROCEDURE — 83880 ASSAY OF NATRIURETIC PEPTIDE: CPT

## 2021-01-29 PROCEDURE — 99285 EMERGENCY DEPT VISIT HI MDM: CPT

## 2021-01-29 RX ORDER — ACETAMINOPHEN 500 MG
1000 TABLET ORAL ONCE
Status: DISCONTINUED | OUTPATIENT
Start: 2021-01-29 | End: 2021-01-29 | Stop reason: HOSPADM

## 2021-01-29 RX ORDER — CLONIDINE HYDROCHLORIDE 0.1 MG/1
0.1 TABLET ORAL
Status: COMPLETED | OUTPATIENT
Start: 2021-01-29 | End: 2021-01-29

## 2021-01-29 RX ORDER — HYDROCHLOROTHIAZIDE 25 MG/1
25 TABLET ORAL DAILY
Qty: 10 TAB | Refills: 0 | Status: SHIPPED | OUTPATIENT
Start: 2021-01-29 | End: 2021-02-08

## 2021-01-29 RX ORDER — ALBUTEROL SULFATE 0.83 MG/ML
2.5 SOLUTION RESPIRATORY (INHALATION)
Qty: 30 EACH | Refills: 0 | Status: ON HOLD | OUTPATIENT
Start: 2021-01-29 | End: 2021-03-01 | Stop reason: SDUPTHER

## 2021-01-29 RX ORDER — CLONIDINE HYDROCHLORIDE 0.2 MG/1
0.2 TABLET ORAL 3 TIMES DAILY
Qty: 45 TAB | Refills: 0 | Status: SHIPPED | OUTPATIENT
Start: 2021-01-29 | End: 2021-02-13

## 2021-01-29 RX ORDER — NEBULIZER AND COMPRESSOR
1 EACH MISCELLANEOUS AS NEEDED
Qty: 1 EACH | Refills: 0 | Status: SHIPPED | OUTPATIENT
Start: 2021-01-29

## 2021-01-29 RX ADMIN — CLONIDINE HYDROCHLORIDE 0.1 MG: 0.1 TABLET ORAL at 12:35

## 2021-01-29 NOTE — ED PROVIDER NOTES
EMERGENCY DEPARTMENT HISTORY AND PHYSICAL EXAM    5:49 AM      Date: 1/29/2021  Patient Name: Abraham Webb    History of Presenting Illness     Chief Complaint   Patient presents with    Shortness of Breath         History Provided By: Patient, EMS    Additional History (Context): Abraham Webb is a 63 y.o. female with history of COPD brought in by EMS for worsening dyspnea. Patient states her shortness of breath \"never stops\", however today she went to the methadone clinic and that \"they dropped my nebulizer machine\", which is now broken so she cannot give herself treatments. Denies any new fever, cough, chest pain. She does report bilateral leg pain and swelling. PCP: Alta Carpenter MD    Current Outpatient Medications   Medication Sig Dispense Refill    hydroCHLOROthiazide (HYDRODIURIL) 25 mg tablet Take 1 Tab by mouth daily for 10 days. 10 Tab 0    cloNIDine HCL (CATAPRES) 0.2 mg tablet Take 1 Tab by mouth three (3) times daily for 15 days. Indications: high blood pressure 45 Tab 0    albuterol (PROVENTIL VENTOLIN) 2.5 mg /3 mL (0.083 %) nebu 3 mL by Nebulization route every four (4) hours as needed for Wheezing. 30 Each 0    Nebulizer & Compressor machine 1 Each by Does Not Apply route as needed for Wheezing or Shortness of Breath. 1 Each 0    furosemide (Lasix) 20 mg tablet Take 1 Tab by mouth daily. 15 Tab 0    ALPRAZolam (Xanax) 1 mg tablet Take 1 Tab by mouth every eight (8) hours as needed for Anxiety. Max Daily Amount: 3 mg. 8 Tab 0    predniSONE (DELTASONE) 20 mg tablet Take 60 mg by mouth daily (with breakfast). 5 Tab 0    fluticasone furoate-vilanteroL (Breo Ellipta) 200-25 mcg/dose inhaler Take 1 Puff by inhalation daily. Indications: controller medication for asthma 1 Inhaler 3    nitroglycerin (NITROSTAT) 0.4 mg SL tablet Take 1 Tab by mouth every five (5) minutes as needed for Chest Pain.  Sit/Lay down then put one tab under the tongue every 5 minutes as needed for chest pain for 3 doses 1 Bottle 0    aspirin delayed-release 81 mg tablet Take 1 Tab by mouth daily. 30 Tab 0    amLODIPine (NORVASC) 10 mg tablet Take 1 Tab by mouth daily for 30 days. Indications: high blood pressure 30 Tab 0    lisinopriL (PriniviL) 20 mg tablet Take 1 Tab by mouth daily for 30 days. 30 Tab 0    montelukast (SINGULAIR) 10 mg tablet Take 1 Tab by mouth nightly. Indications: controller medication for asthma 30 Tab 1    roflumilast (DALIRESP) 500 mcg tab tablet Take 1 Tab by mouth daily. 30 Tab 3    citalopram (CeleXA) 20 mg tablet Take 1 Tab by mouth every morning. 30 Tab 0    famotidine (PEPCID) 20 mg tablet Take 1 Tab by mouth daily. (Patient taking differently: Take 20 mg by mouth daily as needed.) 30 Tab 1    OXYGEN-AIR DELIVERY SYSTEMS 3 L by IntraNASal route as needed for Other (sob).  atorvastatin (LIPITOR) 20 mg tablet Take 1 Tab by mouth nightly. 30 Tab 0    naloxone (NARCAN) 4 mg/actuation nasal spray Use 1 spray intranasally, then discard. Repeat with new spray every 2 min as needed for opioid overdose symptoms, alternating nostrils.  1 Each 0       Past History     Past Medical History:  Past Medical History:   Diagnosis Date    CHF (congestive heart failure) (HCC)     Chronic respiratory failure with hypoxia ([facility] [address] 9/7/2020    CKD (chronic kidney disease) stage 3, GFR 30-59 ml/min 10/31/2019    Cocaine abuse ([address] 11/26/2017    COPD (chronic obstructive pulmonary disease) with chronic bronchitis ([address] 12/19/2017    Dependence on supplemental oxygen     Depression 3/22/2020    Drug-seeking behavior 11/19/2016    Endocrine disease     thyroid issues    Essential hypertension 3/10/2017    Fe deficiency anemia 10/27/2012    Fibromyalgia 12/16/2016    Gastroesophageal reflux disease without esophagitis 10/10/2016    Gastrointestinal disorder     \"blockage in my stomach\"    Hypercholesterolemia     Hypertension     Hypertensive heart failure ([address] 12/19/2017    Morbid obesity due to excess calories ([facility] [address] 3/10/2017    NSTEMI (non-ST elevated myocardial infarction) ([facility] [address] 3/22/2020    On home O2 12/3/2015    Overview:  2L at home per pt for approx 8 years    Polysubstance abuse ([facility] [address] 9/15/2018    Respiratory failure ([facility] [address] 1/11/2017    S/P hernia repair 10/31/2019    Sleep apnea 12/19/2017    T wave inversion in EKG 3/9/2019    Tobacco use 2/96/9981    Uncomplicated severe persistent asthma 12/13/2016    Vocal cord disease 12/7/2016       Past Surgical History:  Past Surgical History:   Procedure Laterality Date    HX GI      Exploratory laparotomy with lysis of adhesions and primary repair of incarcerated umbilical hernia       Family History:  History reviewed. No pertinent family history. Social History:  Social History     Tobacco Use    Smoking status: Former Smoker     Packs/day: 0.25    Smokeless tobacco: Current User    Tobacco comment: Pt states she has not had money to buy cigarettes in the past month   Substance Use Topics    Alcohol use: No     Comment: socially    Drug use: Not Currently     Types: Heroin     Comment: pt reports using approximately a month ago       Allergies:  No Known Allergies      Review of Systems       Review of Systems   Constitutional: Negative for chills and fever. HENT: Negative for congestion, ear pain and sore throat. Eyes: Negative for pain. Respiratory: Positive for shortness of breath. Negative for cough and wheezing. Cardiovascular: Negative for chest pain and leg swelling. Gastrointestinal: Negative for abdominal pain, diarrhea, nausea and vomiting. Genitourinary: Negative for dysuria and flank pain. Musculoskeletal: Positive for myalgias. Negative for gait problem. Skin: Positive for rash. Negative for color change. Neurological: Negative for dizziness and headaches. Hematological: Does not bruise/bleed easily. Psychiatric/Behavioral: Negative for behavioral problems.          Physical Exam     Visit Vitals  BP (!) 133/56   Pulse 96   Temp 97 °F (36.1 °C)   Resp 26   Ht 5' (1.524 m)   Wt 72.1 kg (159 lb)   LMP 04/14/2009   SpO2 90%   BMI 31.05 kg/m²         Physical Exam  Constitutional:       General: She is not in acute distress. Appearance: Normal appearance. She is not ill-appearing or toxic-appearing. HENT:      Head: Normocephalic and atraumatic. Right Ear: External ear normal.      Left Ear: External ear normal.      Nose: Nose normal.      Mouth/Throat:      Mouth: Mucous membranes are moist.   Eyes:      Extraocular Movements: Extraocular movements intact. Conjunctiva/sclera: Conjunctivae normal.      Pupils: Pupils are equal, round, and reactive to light. Neck:      Musculoskeletal: Normal range of motion and neck supple. Cardiovascular:      Rate and Rhythm: Normal rate and regular rhythm. Pulses: Normal pulses. Heart sounds: Normal heart sounds. Pulmonary:      Effort: Tachypnea and respiratory distress (mild) present. Breath sounds: Decreased breath sounds present. Abdominal:      General: There is no distension. Palpations: Abdomen is soft. There is no mass. Tenderness: There is no abdominal tenderness. There is no guarding. Musculoskeletal: Normal range of motion. General: No swelling or deformity. Right lower leg: She exhibits tenderness. Edema present. Left lower leg: She exhibits tenderness. Edema present. Skin:     General: Skin is warm and dry. Capillary Refill: Capillary refill takes less than 2 seconds. Findings: Rash (reticular discoloration to bilateral knees) present. Neurological:      General: No focal deficit present. Mental Status: She is alert and oriented to person, place, and time.    Psychiatric:         Mood and Affect: Mood normal.         Behavior: Behavior normal.           Diagnostic Study Results     EKG/Labs -  No results found for this or any previous visit (from the past 12 hour(s)). Radiologic Studies -   XR CHEST PORT   Final Result      Interval development of bilateral lower lung airspace disease with possible   superimposed left pleural effusion as above. Moderate central vascular engorgement also developed. Thank you for your referral.            Medical Decision Making   I reviewed available nursing notes, past medical history, past surgical history, family history and social history. Vital Signs-Reviewed the patient's vital signs. Records Reviewed: Nursing Notes and Old Medical Records    Medical Decision Making:  Patient presenting with dyspnea. Has numerous previous visits for same. Will do labs, EKG, CXR. Will also do venous duplex for bilateral leg swelling. ED Course: Progress Notes, Reevaluation, and Consults:  Upon arrival patient yelling and cursing at staff, demanding to be placed in a room with a working tv. Patient seen and examined. Able to speak in full sentences. Complains of pain. Tylenol PO ordered, patient states she does not take Tylenol, only Percocet. Explained that there is Tylenol in Percocet. Patient declined pain medication. Diagnosis     Clinical Impression:   1. Bilateral leg edema    2. SOB (shortness of breath)        Disposition: Patient signed out to incoming physician at shift change, pending bilateral venous duplex, re-evaluation, and final disposition.

## 2021-01-29 NOTE — DISCHARGE INSTRUCTIONS
SPECIFIC PATIENT INSTRUCTIONS FROM THE PHYSICIAN WHO TREATED YOU IN THE ER TODAY:  1. Return if worse. 2. Follow up with your primary doctor in the next 1-2 days for outpatient reevaluation.

## 2021-01-29 NOTE — ED NOTES
Note:  Assuming care of patient at beginning of shift    7:25 AM  I, Iman Crystal MD, assumed care of patient at the beginning of my shift. 7:25 AM    Date: 1/29/2021  Patient Name: Ananya Khan    History of Presenting Illness     Chief Complaint   Patient presents with    Shortness of Breath       Nursing notes regarding the HPI and triage nursing notes were reviewed. Prior medical records were reviewed. Current Facility-Administered Medications   Medication Dose Route Frequency Provider Last Rate Last Admin    acetaminophen (TYLENOL) tablet 1,000 mg  1,000 mg Oral ONCE Maria Isabel Marmolejo MD         Current Outpatient Medications   Medication Sig Dispense Refill    furosemide (Lasix) 20 mg tablet Take 1 Tab by mouth daily. 15 Tab 0    ALPRAZolam (Xanax) 1 mg tablet Take 1 Tab by mouth every eight (8) hours as needed for Anxiety. Max Daily Amount: 3 mg. 8 Tab 0    predniSONE (DELTASONE) 20 mg tablet Take 60 mg by mouth daily (with breakfast). 5 Tab 0    fluticasone furoate-vilanteroL (Breo Ellipta) 200-25 mcg/dose inhaler Take 1 Puff by inhalation daily. Indications: controller medication for asthma 1 Inhaler 3    albuterol (PROVENTIL HFA, VENTOLIN HFA, PROAIR HFA) 90 mcg/actuation inhaler Take 2 Puffs by inhalation every four (4) hours as needed for Wheezing or Shortness of Breath. Indications: bronchospasm prevention 1 Inhaler 0    nitroglycerin (NITROSTAT) 0.4 mg SL tablet Take 1 Tab by mouth every five (5) minutes as needed for Chest Pain. Sit/Lay down then put one tab under the tongue every 5 minutes as needed for chest pain for 3 doses 1 Bottle 0    aspirin delayed-release 81 mg tablet Take 1 Tab by mouth daily. 30 Tab 0    amLODIPine (NORVASC) 10 mg tablet Take 1 Tab by mouth daily for 30 days. Indications: high blood pressure 30 Tab 0    lisinopriL (PriniviL) 20 mg tablet Take 1 Tab by mouth daily for 30 days.  30 Tab 0    montelukast (SINGULAIR) 10 mg tablet Take 1 Tab by mouth nightly. Indications: controller medication for asthma 30 Tab 1    roflumilast (DALIRESP) 500 mcg tab tablet Take 1 Tab by mouth daily. 30 Tab 3    albuterol (ACCUNEB) 1.25 mg/3 mL nebu Take 3 mL by inhalation every four (4) hours as needed for Wheezing (wheezing). Indications: bronchospasm prevention 25 Each 5    citalopram (CeleXA) 20 mg tablet Take 1 Tab by mouth every morning. 30 Tab 0    cloNIDine HCL (CATAPRES) 0.2 mg tablet Take 0.2 mg by mouth three (3) times daily.  famotidine (PEPCID) 20 mg tablet Take 1 Tab by mouth daily. (Patient taking differently: Take 20 mg by mouth daily as needed.) 30 Tab 1    OXYGEN-AIR DELIVERY SYSTEMS 3 L by IntraNASal route as needed for Other (sob).  atorvastatin (LIPITOR) 20 mg tablet Take 1 Tab by mouth nightly. 30 Tab 0    naloxone (NARCAN) 4 mg/actuation nasal spray Use 1 spray intranasally, then discard. Repeat with new spray every 2 min as needed for opioid overdose symptoms, alternating nostrils.  1 Each 0       Past History     Past Medical History:  Past Medical History:   Diagnosis Date    CHF (congestive heart failure) (HCC)     Chronic respiratory failure with hypoxia ([address] 9/7/2020    CKD (chronic kidney disease) stage 3, GFR 30-59 ml/min 10/31/2019    Cocaine abuse ([address] 11/26/2017    COPD (chronic obstructive pulmonary disease) with chronic bronchitis ([address] 12/19/2017    Dependence on supplemental oxygen     Depression 3/22/2020    Drug-seeking behavior 11/19/2016    Endocrine disease     thyroid issues    Essential hypertension 3/10/2017    Fe deficiency anemia 10/27/2012    Fibromyalgia 12/16/2016    Gastroesophageal reflux disease without esophagitis 10/10/2016    Gastrointestinal disorder     \"blockage in my stomach\"    Hypercholesterolemia     Hypertension     Hypertensive heart failure ([address] 12/19/2017    Morbid obesity due to excess calories ([address] 3/10/2017    NSTEMI (non-ST elevated myocardial infarction) ([address] 3/22/2020    On home O2 12/3/2015    Overview:  2L at home per pt for approx 8 years    Polysubstance abuse ([facility] [address] 9/15/2018    Respiratory failure ([facility] [address] 1/11/2017    S/P hernia repair 10/31/2019    Sleep apnea 12/19/2017    T wave inversion in EKG 3/9/2019    Tobacco use 8/67/7174    Uncomplicated severe persistent asthma 12/13/2016    Vocal cord disease 12/7/2016       Past Surgical History:  Past Surgical History:   Procedure Laterality Date    HX GI      Exploratory laparotomy with lysis of adhesions and primary repair of incarcerated umbilical hernia       Family History:  History reviewed. No pertinent family history. Social History:  Social History     Tobacco Use    Smoking status: Former Smoker     Packs/day: 0.25    Smokeless tobacco: Current User    Tobacco comment: Pt states she has not had money to buy cigarettes in the past month   Substance Use Topics    Alcohol use: No     Comment: socially    Drug use: Not Currently     Types: Heroin     Comment: pt reports using approximately a month ago       Allergies:  No Known Allergies    Patient's primary care provider (as noted in EPIC):  Nahed Grier MD    Abnormal lab results from this emergency department encounter:  Labs Reviewed   CBC WITH AUTOMATED DIFF - Abnormal; Notable for the following components:       Result Value    RBC 4.02 (*)     HGB 11.6 (*)     NEUTROPHILS 88 (*)     LYMPHOCYTES 9 (*)     ABS.  NEUTROPHILS 8.8 (*)     All other components within normal limits   METABOLIC PANEL, COMPREHENSIVE - Abnormal; Notable for the following components:    CO2 33 (*)     BUN 24 (*)     BUN/Creatinine ratio 21 (*)     GFR est non-AA 50 (*)     Albumin 3.0 (*)     Globulin 4.3 (*)     A-G Ratio 0.7 (*)     All other components within normal limits   CARDIAC PANEL,(CK, CKMB & TROPONIN)   NT-PRO BNP       Lab values for this patient within approximately the last 12 hours:  Recent Results (from the past 12 hour(s))   EKG, 12 LEAD, INITIAL    Collection Time: 01/29/21  5:47 AM   Result Value Ref Range    Ventricular Rate 92 BPM    Atrial Rate 92 BPM    P-R Interval 118 ms    QRS Duration 86 ms    Q-T Interval 342 ms    QTC Calculation (Bezet) 422 ms    Calculated P Axis 78 degrees    Calculated R Axis 50 degrees    Calculated T Axis 48 degrees    Diagnosis       Normal sinus rhythm  Possible Left atrial enlargement  Left ventricular hypertrophy  T wave abnormality, consider lateral ischemia  Abnormal ECG    Confirmed by Lisadnro Conn MD, Bora Vo ([id number]) on 1/29/2021 8:18:07 AM     CBC WITH AUTOMATED DIFF    Collection Time: 01/29/21  6:02 AM   Result Value Ref Range    WBC 10.0 4.6 - 13.2 K/uL    RBC 4.02 (L) 4.20 - 5.30 M/uL    HGB 11.6 (L) 12.0 - 16.0 g/dL    HCT 35.8 35.0 - 45.0 %    MCV 89.1 74.0 - 97.0 FL    MCH 28.9 24.0 - 34.0 PG    MCHC 32.4 31.0 - 37.0 g/dL    RDW 13.4 11.6 - 14.5 %    PLATELET 923 136 - 977 K/uL    MPV 10.5 9.2 - 11.8 FL    NEUTROPHILS 88 (H) 40 - 73 %    LYMPHOCYTES 9 (L) 21 - 52 %    MONOCYTES 3 3 - 10 %    EOSINOPHILS 0 0 - 5 %    BASOPHILS 0 0 - 2 %    ABS. NEUTROPHILS 8.8 (H) 1.8 - 8.0 K/UL    ABS. LYMPHOCYTES 0.9 0.9 - 3.6 K/UL    ABS. MONOCYTES 0.3 0.05 - 1.2 K/UL    ABS. EOSINOPHILS 0.0 0.0 - 0.4 K/UL    ABS. BASOPHILS 0.0 0.0 - 0.1 K/UL    DF AUTOMATED     METABOLIC PANEL, COMPREHENSIVE    Collection Time: 01/29/21  6:02 AM   Result Value Ref Range    Sodium 145 136 - 145 mmol/L    Potassium 3.5 3.5 - 5.5 mmol/L    Chloride 104 100 - 111 mmol/L    CO2 33 (H) 21 - 32 mmol/L    Anion gap 8 3.0 - 18 mmol/L    Glucose 89 74 - 99 mg/dL    BUN 24 (H) 7.0 - 18 MG/DL    Creatinine 1.12 0.6 - 1.3 MG/DL    BUN/Creatinine ratio 21 (H) 12 - 20      GFR est AA >60 >60 ml/min/1.73m2    GFR est non-AA 50 (L) >60 ml/min/1.73m2    Calcium 8.7 8.5 - 10.1 MG/DL    Bilirubin, total 0.4 0.2 - 1.0 MG/DL    ALT (SGPT) 26 13 - 56 U/L    AST (SGOT) 20 10 - 38 U/L    Alk.  phosphatase 76 45 - 117 U/L    Protein, total 7.3 6.4 - 8.2 g/dL Albumin 3.0 (L) 3.4 - 5.0 g/dL    Globulin 4.3 (H) 2.0 - 4.0 g/dL    A-G Ratio 0.7 (L) 0.8 - 1.7     CARDIAC PANEL,(CK, CKMB & TROPONIN)    Collection Time: 01/29/21  6:02 AM   Result Value Ref Range    CK - MB <1.0 <3.6 ng/ml    CK-MB Index  0.0 - 4.0 %     CALCULATION NOT PERFORMED WHEN RESULT IS BELOW LINEAR LIMIT    CK 57 26 - 192 U/L    Troponin-I, QT <0.02 0.0 - 0.045 NG/ML   NT-PRO BNP    Collection Time: 01/29/21  6:02 AM   Result Value Ref Range    NT pro- 0 - 900 PG/ML       Radiologist and cardiologist interpretations if available at time of this note:  Radiology results:  Duplex Lower Ext Venous Bilat    Result Date: 1/29/2021  · No evidence of acute deep vein thrombosis in the right common femoral, superficial femoral, popliteal, posterior tibial, and peroneal veins. The veins were imaged in the transverse and longitudinal planes. The vessels showed normal color filling and compressibility. Doppler interrogation showed phasic and spontaneous flow. · No evidence of acute deep vein thrombosis in the left common femoral, superficial femoral, popliteal, posterior tibial, and peroneal veins. The veins were imaged in the transverse and longitudinal planes. The vessels showed normal color filling and compressibility. Doppler interrogation showed phasic and spontaneous flow. No evidence of deep vein thrombosis within the bilateral lower extremities. Medication(s) ordered for patient during this emergency visit encounter:  Medications   acetaminophen (TYLENOL) tablet 1,000 mg (has no administration in time range)       Pt care assumed from Dr. David Nesbitt , ED provider. Pt complaint(s), current treatment plan, progression and available diagnostic results have been discussed thoroughly. Rounding occurred: no  Intended Disposition: To be determined. Pending diagnostic reports and/or labs (please list): Bilateral lower extremity PVL to assess for DVTs.     9:50 AM  Bilateral PVLs showed no DVTs.    SPECIFIC PATIENT INSTRUCTIONS FROM THE PHYSICIAN WHO TREATED YOU IN THE ER TODAY:  1. Return if worse. 2. Follow up with your primary doctor in the next 1-2 days for outpatient reevaluation. Patient is improved, resting quietly and comfortably. The patient will be discharged home. The patient was reassured that these symptoms do not appear to represent a serious or life threatening condition at this time. Warning signs of worsening condition were discussed and understood by the patient. Based on patient's age, coexisting illness, exam, and the results of this ED evaluation, the decision to treat as an outpatient was made. Based on the information available at time of discharge, acute pathology requiring immediate intervention was deemed relative unlikely. While it is impossible to completely exclude the possibility of underlying serious disease or worsening of condition, I feel the relative likelihood is extremely low. I discussed this uncertainty with the patient, who understood ED evaluation and treatment and felt comfortable with the outpatient treatment plan. All questions regarding care, test results, and follow up were answered. The patient is stable and appropriate to discharge. They understand that they should return to the emergency department for any new or worsening symptoms. I stressed the importance of follow up for repeat assessment and possibly further evaluation/treatment. Dictation disclaimer:  Please note that this dictation was completed with [employer], the computer voice recognition software. Quite often unanticipated grammatical, syntax, homophones, and other interpretive errors are inadvertently transcribed by the computer software. Please disregard these errors. Please excuse any errors that have escaped final proofreading. Coding Diagnoses     Clinical Impression:   1. Bilateral leg edema    2.  SOB (shortness of breath)        Disposition     Disposition: Discharge. [place] M.D.   DAVON Board Certified Emergency Physician

## 2021-01-29 NOTE — ED NOTES
Patient arrived by EMS with SOB. Patient also has complaints of blisters bilateral lower extremities.

## 2021-02-17 ENCOUNTER — APPOINTMENT (OUTPATIENT)
Dept: GENERAL RADIOLOGY | Age: 56
End: 2021-02-17
Attending: EMERGENCY MEDICINE
Payer: MEDICAID

## 2021-02-17 ENCOUNTER — HOSPITAL ENCOUNTER (EMERGENCY)
Age: 56
Discharge: HOME | End: 2021-02-17
Attending: EMERGENCY MEDICINE
Payer: MEDICAID

## 2021-02-17 VITALS
SYSTOLIC BLOOD PRESSURE: 166 MMHG | RESPIRATION RATE: 20 BRPM | OXYGEN SATURATION: 98 % | DIASTOLIC BLOOD PRESSURE: 91 MMHG | TEMPERATURE: 96.9 F | HEART RATE: 90 BPM

## 2021-02-17 DIAGNOSIS — M79.89 LEG SWELLING: ICD-10-CM

## 2021-02-17 DIAGNOSIS — R06.02 SOB (SHORTNESS OF BREATH): Primary | ICD-10-CM

## 2021-02-17 LAB
ALBUMIN SERPL-MCNC: 3 G/DL (ref 3.4–5)
ALBUMIN/GLOB SERPL: 0.9 {RATIO} (ref 0.8–1.7)
ALP SERPL-CCNC: 85 U/L (ref 45–117)
ALT SERPL-CCNC: 18 U/L (ref 13–56)
ANION GAP SERPL CALC-SCNC: 5 MMOL/L (ref 3–18)
AST SERPL-CCNC: 11 U/L (ref 10–38)
ATRIAL RATE: 81 BPM
BASOPHILS # BLD: 0 K/UL (ref 0–0.1)
BASOPHILS NFR BLD: 0 % (ref 0–2)
BILIRUB SERPL-MCNC: 0.5 MG/DL (ref 0.2–1)
BNP SERPL-MCNC: 751 PG/ML (ref 0–900)
BUN SERPL-MCNC: 8 MG/DL (ref 7–18)
BUN/CREAT SERPL: 9 (ref 12–20)
CALCIUM SERPL-MCNC: 8.1 MG/DL (ref 8.5–10.1)
CALCULATED P AXIS, ECG09: 45 DEGREES
CALCULATED R AXIS, ECG10: 36 DEGREES
CALCULATED T AXIS, ECG11: 42 DEGREES
CHLORIDE SERPL-SCNC: 111 MMOL/L (ref 100–111)
CK MB CFR SERPL CALC: 3 % (ref 0–4)
CK MB CFR SERPL CALC: 3 % (ref 0–4)
CK MB SERPL-MCNC: 1.9 NG/ML (ref 5–25)
CK MB SERPL-MCNC: 2 NG/ML (ref 5–25)
CK SERPL-CCNC: 64 U/L (ref 26–192)
CK SERPL-CCNC: 66 U/L (ref 26–192)
CO2 SERPL-SCNC: 29 MMOL/L (ref 21–32)
CREAT SERPL-MCNC: 0.89 MG/DL (ref 0.6–1.3)
DIAGNOSIS, 93000: NORMAL
DIFFERENTIAL METHOD BLD: ABNORMAL
EOSINOPHIL # BLD: 0 K/UL (ref 0–0.4)
EOSINOPHIL NFR BLD: 0 % (ref 0–5)
ERYTHROCYTE [DISTWIDTH] IN BLOOD BY AUTOMATED COUNT: 15 % (ref 11.6–14.5)
GLOBULIN SER CALC-MCNC: 3.4 G/DL (ref 2–4)
GLUCOSE SERPL-MCNC: 96 MG/DL (ref 74–99)
HCT VFR BLD AUTO: 35 % (ref 35–45)
HGB BLD-MCNC: 10.8 G/DL (ref 12–16)
LYMPHOCYTES # BLD: 1.2 K/UL (ref 0.9–3.6)
LYMPHOCYTES NFR BLD: 16 % (ref 21–52)
MAGNESIUM SERPL-MCNC: 2.7 MG/DL (ref 1.6–2.6)
MCH RBC QN AUTO: 27.8 PG (ref 24–34)
MCHC RBC AUTO-ENTMCNC: 30.9 G/DL (ref 31–37)
MCV RBC AUTO: 90.2 FL (ref 74–97)
MONOCYTES # BLD: 0.4 K/UL (ref 0.05–1.2)
MONOCYTES NFR BLD: 5 % (ref 3–10)
NEUTS SEG # BLD: 6 K/UL (ref 1.8–8)
NEUTS SEG NFR BLD: 79 % (ref 40–73)
P-R INTERVAL, ECG05: 136 MS
PLATELET # BLD AUTO: 200 K/UL (ref 135–420)
PMV BLD AUTO: 9.8 FL (ref 9.2–11.8)
POTASSIUM SERPL-SCNC: 3.7 MMOL/L (ref 3.5–5.5)
PROT SERPL-MCNC: 6.4 G/DL (ref 6.4–8.2)
Q-T INTERVAL, ECG07: 374 MS
QRS DURATION, ECG06: 82 MS
QTC CALCULATION (BEZET), ECG08: 434 MS
RBC # BLD AUTO: 3.88 M/UL (ref 4.2–5.3)
SODIUM SERPL-SCNC: 145 MMOL/L (ref 136–145)
TROPONIN I SERPL-MCNC: <0.02 NG/ML (ref 0–0.04)
TROPONIN I SERPL-MCNC: <0.02 NG/ML (ref 0–0.04)
VENTRICULAR RATE, ECG03: 81 BPM
WBC # BLD AUTO: 7.6 K/UL (ref 4.6–13.2)

## 2021-02-17 PROCEDURE — 74011636637 HC RX REV CODE- 636/637: Performed by: EMERGENCY MEDICINE

## 2021-02-17 PROCEDURE — 80053 COMPREHEN METABOLIC PANEL: CPT

## 2021-02-17 PROCEDURE — 99284 EMERGENCY DEPT VISIT MOD MDM: CPT

## 2021-02-17 PROCEDURE — 85025 COMPLETE CBC W/AUTO DIFF WBC: CPT

## 2021-02-17 PROCEDURE — 71045 X-RAY EXAM CHEST 1 VIEW: CPT

## 2021-02-17 PROCEDURE — 74011250636 HC RX REV CODE- 250/636: Performed by: EMERGENCY MEDICINE

## 2021-02-17 PROCEDURE — 82553 CREATINE MB FRACTION: CPT

## 2021-02-17 PROCEDURE — 83735 ASSAY OF MAGNESIUM: CPT

## 2021-02-17 PROCEDURE — 93005 ELECTROCARDIOGRAM TRACING: CPT

## 2021-02-17 PROCEDURE — 96374 THER/PROPH/DIAG INJ IV PUSH: CPT

## 2021-02-17 PROCEDURE — 83880 ASSAY OF NATRIURETIC PEPTIDE: CPT

## 2021-02-17 RX ORDER — ALBUTEROL SULFATE 0.83 MG/ML
2.5 SOLUTION RESPIRATORY (INHALATION)
Status: DISCONTINUED | OUTPATIENT
Start: 2021-02-17 | End: 2021-02-17 | Stop reason: HOSPADM

## 2021-02-17 RX ORDER — FUROSEMIDE 10 MG/ML
40 INJECTION INTRAMUSCULAR; INTRAVENOUS ONCE
Status: COMPLETED | OUTPATIENT
Start: 2021-02-17 | End: 2021-02-17

## 2021-02-17 RX ORDER — DOXYCYCLINE HYCLATE 100 MG
100 TABLET ORAL 2 TIMES DAILY
Qty: 14 TAB | Refills: 0 | Status: SHIPPED | OUTPATIENT
Start: 2021-02-17 | End: 2021-02-24

## 2021-02-17 RX ORDER — FUROSEMIDE 40 MG/1
40 TABLET ORAL DAILY
Qty: 10 TAB | Refills: 0 | Status: SHIPPED | OUTPATIENT
Start: 2021-02-17 | End: 2021-03-01

## 2021-02-17 RX ORDER — PREDNISONE 20 MG/1
60 TABLET ORAL
Status: COMPLETED | OUTPATIENT
Start: 2021-02-17 | End: 2021-02-17

## 2021-02-17 RX ADMIN — PREDNISONE 60 MG: 20 TABLET ORAL at 07:55

## 2021-02-17 RX ADMIN — FUROSEMIDE 40 MG: 10 INJECTION, SOLUTION INTRAMUSCULAR; INTRAVENOUS at 07:55

## 2021-02-17 NOTE — ED PROVIDER NOTES
EMERGENCY DEPARTMENT HISTORY AND PHYSICAL EXAM  This was created with voice recognition software and transcription errors may be present. 7:47 AM  Date: 2/17/2021  Patient Name: Daphney Saini    History of Presenting Illness     Chief Complaint:    History Provided By:     HPI: Daphney Saini is a 63 y.o. female past medical history of CHF COPD CKD depression endocrine disease hypertension fibromyalgia reflux morbid obesity NSTEMI on home oxygen who presents with shortness of breath. Patient states that he has been having worsening shortness of breath and leg swelling since she saw me last about 3 weeks ago. Notes the shortness of breath is worse at night she has been using her albuterol at home without significant relief.     PCP: Sharath Marie MD      Past History     Past Medical History:  Past Medical History:   Diagnosis Date    CHF (congestive heart failure) ([address]     Chronic respiratory failure with hypoxia ([address] 9/7/2020    CKD (chronic kidney disease) stage 3, GFR 30-59 ml/min 10/31/2019    Cocaine abuse ([address] 11/26/2017    COPD (chronic obstructive pulmonary disease) with chronic bronchitis ([address] 12/19/2017    Dependence on supplemental oxygen     Depression 3/22/2020    Drug-seeking behavior 11/19/2016    Endocrine disease     thyroid issues    Essential hypertension 3/10/2017    Fe deficiency anemia 10/27/2012    Fibromyalgia 12/16/2016    Gastroesophageal reflux disease without esophagitis 10/10/2016    Gastrointestinal disorder     \"blockage in my stomach\"    Hypercholesterolemia     Hypertension     Hypertensive heart failure ([address] 12/19/2017    Morbid obesity due to excess calories ([address] 3/10/2017    NSTEMI (non-ST elevated myocardial infarction) ([address] 3/22/2020    On home O2 12/3/2015    Overview:  2L at home per pt for approx 8 years    Polysubstance abuse ([address] 9/15/2018    Respiratory failure ([address] 1/11/2017    S/P hernia repair 10/31/2019    Sleep apnea 12/19/2017    T wave inversion in EKG 3/9/2019    Tobacco use 9/03/4233    Uncomplicated severe persistent asthma 12/13/2016    Vocal cord disease 12/7/2016       Past Surgical History:  Past Surgical History:   Procedure Laterality Date    HX GI      Exploratory laparotomy with lysis of adhesions and primary repair of incarcerated umbilical hernia       Family History:  No family history on file. Social History:  Social History     Tobacco Use    Smoking status: Former Smoker     Packs/day: 0.25    Smokeless tobacco: Current User    Tobacco comment: Pt states she has not had money to buy cigarettes in the past month   Substance Use Topics    Alcohol use: No     Comment: socially    Drug use: Not Currently     Types: Heroin     Comment: pt reports using approximately a month ago       Allergies:  No Known Allergies    Review of Systems     Review of Systems   All other systems reviewed and are negative. 10 point review of systems otherwise negative unless noted in HPI. Physical Exam       Physical Exam  Constitutional:       Appearance: She is well-developed. HENT:      Head: Normocephalic and atraumatic. Eyes:      Pupils: Pupils are equal, round, and reactive to light. Neck:      Musculoskeletal: Normal range of motion and neck supple. Cardiovascular:      Rate and Rhythm: Normal rate and regular rhythm. Heart sounds: Normal heart sounds. No murmur. No friction rub. Pulmonary:      Effort: Pulmonary effort is normal. No respiratory distress. Breath sounds: Decreased breath sounds present. No wheezing. Comments: Coarse breath sounds bilaterally  Abdominal:      General: There is no distension. Palpations: Abdomen is soft. Tenderness: There is no abdominal tenderness. There is no guarding or rebound. Musculoskeletal: Normal range of motion. Skin:     General: Skin is warm and dry.    Neurological:      Mental Status: She is alert and oriented to person, place, and time. Psychiatric:         Behavior: Behavior normal.         Thought Content: Thought content normal.         Diagnostic Study Results     Vital Signs  EKG: EKG shows sinus 91 normal axis normal intervals there is no ST elevation or depression no hypertrophy frequent PVCs  Labs: cbc /chemistry unremarkable: Unremarkable BNP is actually negative  Imaging: X-ray did discuss with radiology this is more likely atelectasis or infiltrate over CHF clinically she does have CHF bilateral lower extremity swelling and shortness of breath worse with laying flat    Medical Decision Making     ED Course: Progress Notes, Reevaluation, and Consults:      Provider Notes (Medical Decision Making):   Patient presents with leg swelling worsening shortness of breath long history of COPD certainly concerning for CHF this time we will start with some Lasix    Patient presents with worsening shortness of breath bilateral lower extremity edema she does have a severe history of COPD as well. History sounds mostly like CHF we will put her on Doxy as well as her BNP is fairly normal will refer to cardiology though as clinically this does sound like CHF    She also notes tingling in her left foot near her toes denies being diabetic she has bounding pulses on that side      She is feeling better she was able to get up and ambulate around the room on her baseline 3 L. She states she does feel better and we discussed her going home unfortunately her oxygen was post be delivered today and she was not home for it she does have a condenser at home that will suffice.     She was asking for an oxygen prescription I attempted to do that and unable to get home oxygen as I said above    I did write the patient for a portable condenser for oxygen she states that she is having difficulty getting around in the oxygen company requires it I spoke to them and I said as long as I fax something over that should be fine    Diagnosis     Clinical Impression: No diagnosis found. Disposition:    Patient's Medications   Start Taking    No medications on file   Continue Taking    ALBUTEROL (PROVENTIL VENTOLIN) 2.5 MG /3 ML (0.083 %) NEBU    3 mL by Nebulization route every four (4) hours as needed for Wheezing. ALPRAZOLAM (XANAX) 1 MG TABLET    Take 1 Tab by mouth every eight (8) hours as needed for Anxiety. Max Daily Amount: 3 mg. ASPIRIN DELAYED-RELEASE 81 MG TABLET    Take 1 Tab by mouth daily. ATORVASTATIN (LIPITOR) 20 MG TABLET    Take 1 Tab by mouth nightly. CITALOPRAM (CELEXA) 20 MG TABLET    Take 1 Tab by mouth every morning. FAMOTIDINE (PEPCID) 20 MG TABLET    Take 1 Tab by mouth daily. FLUTICASONE FUROATE-VILANTEROL (BREO ELLIPTA) 200-25 MCG/DOSE INHALER    Take 1 Puff by inhalation daily. Indications: controller medication for asthma    FUROSEMIDE (LASIX) 20 MG TABLET    Take 1 Tab by mouth daily. MONTELUKAST (SINGULAIR) 10 MG TABLET    Take 1 Tab by mouth nightly. Indications: controller medication for asthma    NALOXONE (NARCAN) 4 MG/ACTUATION NASAL SPRAY    Use 1 spray intranasally, then discard. Repeat with new spray every 2 min as needed for opioid overdose symptoms, alternating nostrils. NEBULIZER & COMPRESSOR MACHINE    1 Each by Does Not Apply route as needed for Wheezing or Shortness of Breath. NITROGLYCERIN (NITROSTAT) 0.4 MG SL TABLET    Take 1 Tab by mouth every five (5) minutes as needed for Chest Pain. Sit/Lay down then put one tab under the tongue every 5 minutes as needed for chest pain for 3 doses    OXYGEN-AIR DELIVERY SYSTEMS    3 L by IntraNASal route as needed for Other (sob). PREDNISONE (DELTASONE) 20 MG TABLET    Take 60 mg by mouth daily (with breakfast). ROFLUMILAST (DALIRESP) 500 MCG TAB TABLET    Take 1 Tab by mouth daily.    These Medications have changed    No medications on file   Stop Taking    No medications on file

## 2021-02-17 NOTE — Clinical Note
[address] [place] [address] Dr [facility] EMERGENCY DEPT 
[address] [facility] [id number] [phone] Work/School Note Date: 2/17/2021 To Whom It May concern: 
 
Margie Saldivar was seen and treated today in the emergency room by the following provider(s): 
Attending Provider: Ross Encarnacion MD. Margie Saldivar is excused from work/school on 02/17/21 and 02/18/21. She is medically clear to return to work/school on 2/19/2021.   
 
 
Sincerely, 
 
 
 
 
Holly Bridges MD

## 2021-02-17 NOTE — ED TRIAGE NOTES
Patient arrives via EMS d/t SOB.  Pt received 2 albuterol treatments, 2g of mag, and 125mg of solumedrol with medics

## 2021-02-24 ENCOUNTER — APPOINTMENT (OUTPATIENT)
Dept: GENERAL RADIOLOGY | Age: 56
DRG: 140 | End: 2021-02-24
Attending: EMERGENCY MEDICINE
Payer: MEDICAID

## 2021-02-24 ENCOUNTER — HOSPITAL ENCOUNTER (EMERGENCY)
Age: 56
Discharge: LEFT BEFORE DISCHARGE | DRG: 140 | End: 2021-02-24
Attending: EMERGENCY MEDICINE
Payer: MEDICAID

## 2021-02-24 ENCOUNTER — APPOINTMENT (OUTPATIENT)
Dept: VASCULAR SURGERY | Age: 56
DRG: 140 | End: 2021-02-24
Attending: PHYSICIAN ASSISTANT
Payer: MEDICAID

## 2021-02-24 VITALS
WEIGHT: 149 LBS | HEART RATE: 89 BPM | SYSTOLIC BLOOD PRESSURE: 196 MMHG | BODY MASS INDEX: 29.25 KG/M2 | OXYGEN SATURATION: 100 % | TEMPERATURE: 98.7 F | HEIGHT: 60 IN | DIASTOLIC BLOOD PRESSURE: 84 MMHG | RESPIRATION RATE: 19 BRPM

## 2021-02-24 DIAGNOSIS — R60.0 BILATERAL LEG EDEMA: Primary | ICD-10-CM

## 2021-02-24 DIAGNOSIS — R06.02 SOB (SHORTNESS OF BREATH): ICD-10-CM

## 2021-02-24 LAB
ALBUMIN SERPL-MCNC: 3.9 G/DL (ref 3.4–5)
ALBUMIN/GLOB SERPL: 1 {RATIO} (ref 0.8–1.7)
ALP SERPL-CCNC: 97 U/L (ref 45–117)
ALT SERPL-CCNC: 19 U/L (ref 13–56)
ANION GAP SERPL CALC-SCNC: 7 MMOL/L (ref 3–18)
AST SERPL-CCNC: 15 U/L (ref 10–38)
ATRIAL RATE: 94 BPM
BASOPHILS # BLD: 0 K/UL (ref 0–0.1)
BASOPHILS NFR BLD: 0 % (ref 0–2)
BILIRUB SERPL-MCNC: 1 MG/DL (ref 0.2–1)
BNP SERPL-MCNC: 783 PG/ML (ref 0–900)
BUN SERPL-MCNC: 21 MG/DL (ref 7–18)
BUN/CREAT SERPL: 23 (ref 12–20)
CALCIUM SERPL-MCNC: 9.5 MG/DL (ref 8.5–10.1)
CALCULATED P AXIS, ECG09: 44 DEGREES
CALCULATED R AXIS, ECG10: 31 DEGREES
CALCULATED T AXIS, ECG11: 57 DEGREES
CHLORIDE SERPL-SCNC: 104 MMOL/L (ref 100–111)
CK MB CFR SERPL CALC: 3.9 % (ref 0–4)
CK MB SERPL-MCNC: 2.2 NG/ML (ref 5–25)
CK SERPL-CCNC: 56 U/L (ref 26–192)
CO2 SERPL-SCNC: 31 MMOL/L (ref 21–32)
CREAT SERPL-MCNC: 0.91 MG/DL (ref 0.6–1.3)
DIAGNOSIS, 93000: NORMAL
DIFFERENTIAL METHOD BLD: ABNORMAL
EOSINOPHIL # BLD: 0.1 K/UL (ref 0–0.4)
EOSINOPHIL NFR BLD: 1 % (ref 0–5)
ERYTHROCYTE [DISTWIDTH] IN BLOOD BY AUTOMATED COUNT: 14.2 % (ref 11.6–14.5)
ERYTHROCYTE [SEDIMENTATION RATE] IN BLOOD: 30 MM/HR (ref 0–30)
GLOBULIN SER CALC-MCNC: 3.9 G/DL (ref 2–4)
GLUCOSE SERPL-MCNC: 85 MG/DL (ref 74–99)
HCT VFR BLD AUTO: 38.7 % (ref 35–45)
HGB BLD-MCNC: 12.6 G/DL (ref 12–16)
LACTATE SERPL-SCNC: 2.1 MMOL/L (ref 0.4–2)
LYMPHOCYTES # BLD: 1.3 K/UL (ref 0.9–3.6)
LYMPHOCYTES NFR BLD: 10 % (ref 21–52)
MCH RBC QN AUTO: 29.1 PG (ref 24–34)
MCHC RBC AUTO-ENTMCNC: 32.6 G/DL (ref 31–37)
MCV RBC AUTO: 89.4 FL (ref 74–97)
MONOCYTES # BLD: 0.8 K/UL (ref 0.05–1.2)
MONOCYTES NFR BLD: 6 % (ref 3–10)
NEUTS SEG # BLD: 10 K/UL (ref 1.8–8)
NEUTS SEG NFR BLD: 83 % (ref 40–73)
P-R INTERVAL, ECG05: 132 MS
PLATELET # BLD AUTO: 251 K/UL (ref 135–420)
PMV BLD AUTO: 10.2 FL (ref 9.2–11.8)
POTASSIUM SERPL-SCNC: 3.5 MMOL/L (ref 3.5–5.5)
PROT SERPL-MCNC: 7.8 G/DL (ref 6.4–8.2)
Q-T INTERVAL, ECG07: 390 MS
QRS DURATION, ECG06: 82 MS
QTC CALCULATION (BEZET), ECG08: 487 MS
RBC # BLD AUTO: 4.33 M/UL (ref 4.2–5.3)
SODIUM SERPL-SCNC: 142 MMOL/L (ref 136–145)
TROPONIN I SERPL-MCNC: <0.02 NG/ML (ref 0–0.04)
VENTRICULAR RATE, ECG03: 94 BPM
WBC # BLD AUTO: 12.1 K/UL (ref 4.6–13.2)

## 2021-02-24 PROCEDURE — 80053 COMPREHEN METABOLIC PANEL: CPT

## 2021-02-24 PROCEDURE — 82550 ASSAY OF CK (CPK): CPT

## 2021-02-24 PROCEDURE — 94640 AIRWAY INHALATION TREATMENT: CPT

## 2021-02-24 PROCEDURE — 87040 BLOOD CULTURE FOR BACTERIA: CPT

## 2021-02-24 PROCEDURE — 93005 ELECTROCARDIOGRAM TRACING: CPT

## 2021-02-24 PROCEDURE — 83880 ASSAY OF NATRIURETIC PEPTIDE: CPT

## 2021-02-24 PROCEDURE — 93970 EXTREMITY STUDY: CPT

## 2021-02-24 PROCEDURE — 96375 TX/PRO/DX INJ NEW DRUG ADDON: CPT

## 2021-02-24 PROCEDURE — 85652 RBC SED RATE AUTOMATED: CPT

## 2021-02-24 PROCEDURE — 85025 COMPLETE CBC W/AUTO DIFF WBC: CPT

## 2021-02-24 PROCEDURE — 96374 THER/PROPH/DIAG INJ IV PUSH: CPT

## 2021-02-24 PROCEDURE — 71045 X-RAY EXAM CHEST 1 VIEW: CPT

## 2021-02-24 PROCEDURE — 74011000250 HC RX REV CODE- 250: Performed by: PHYSICIAN ASSISTANT

## 2021-02-24 PROCEDURE — 83605 ASSAY OF LACTIC ACID: CPT

## 2021-02-24 PROCEDURE — 99284 EMERGENCY DEPT VISIT MOD MDM: CPT

## 2021-02-24 PROCEDURE — 74011250636 HC RX REV CODE- 250/636: Performed by: PHYSICIAN ASSISTANT

## 2021-02-24 RX ORDER — IPRATROPIUM BROMIDE AND ALBUTEROL SULFATE 2.5; .5 MG/3ML; MG/3ML
3 SOLUTION RESPIRATORY (INHALATION) ONCE
Status: COMPLETED | OUTPATIENT
Start: 2021-02-24 | End: 2021-02-24

## 2021-02-24 RX ORDER — CLINDAMYCIN HYDROCHLORIDE 300 MG/1
300 CAPSULE ORAL 4 TIMES DAILY
Qty: 40 CAP | Refills: 0 | Status: ON HOLD | OUTPATIENT
Start: 2021-02-24 | End: 2021-03-01 | Stop reason: SDUPTHER

## 2021-02-24 RX ORDER — SODIUM CHLORIDE 0.9 % (FLUSH) 0.9 %
5-10 SYRINGE (ML) INJECTION AS NEEDED
Status: DISCONTINUED | OUTPATIENT
Start: 2021-02-24 | End: 2021-02-24 | Stop reason: HOSPADM

## 2021-02-24 RX ORDER — VANCOMYCIN/0.9 % SOD CHLORIDE 1.5G/250ML
1500 PLASTIC BAG, INJECTION (ML) INTRAVENOUS ONCE
Status: DISCONTINUED | OUTPATIENT
Start: 2021-02-24 | End: 2021-02-24 | Stop reason: HOSPADM

## 2021-02-24 RX ORDER — MORPHINE SULFATE 4 MG/ML
8 INJECTION, SOLUTION INTRAMUSCULAR; INTRAVENOUS
Status: COMPLETED | OUTPATIENT
Start: 2021-02-24 | End: 2021-02-24

## 2021-02-24 RX ORDER — FUROSEMIDE 10 MG/ML
40 INJECTION INTRAMUSCULAR; INTRAVENOUS
Status: COMPLETED | OUTPATIENT
Start: 2021-02-24 | End: 2021-02-24

## 2021-02-24 RX ADMIN — IPRATROPIUM BROMIDE AND ALBUTEROL SULFATE 3 ML: .5; 3 SOLUTION RESPIRATORY (INHALATION) at 10:39

## 2021-02-24 RX ADMIN — METHYLPREDNISOLONE SODIUM SUCCINATE 125 MG: 125 INJECTION, POWDER, FOR SOLUTION INTRAMUSCULAR; INTRAVENOUS at 10:35

## 2021-02-24 RX ADMIN — MORPHINE SULFATE 8 MG: 4 INJECTION, SOLUTION INTRAMUSCULAR; INTRAVENOUS at 10:35

## 2021-02-24 RX ADMIN — WATER 1 G: 1 INJECTION INTRAMUSCULAR; INTRAVENOUS; SUBCUTANEOUS at 10:35

## 2021-02-24 RX ADMIN — FUROSEMIDE 40 MG: 10 INJECTION, SOLUTION INTRAMUSCULAR; INTRAVENOUS at 10:35

## 2021-02-24 NOTE — ED PROVIDER NOTES
EMERGENCY DEPARTMENT HISTORY AND PHYSICAL EXAM    9:29 AM      Date: 2/24/2021  Patient Name: Yolande Colmenares    History of Presenting Illness     Chief Complaint   Patient presents with    Croup       History Provided By: Patient    Chief Complaint: SOB, leg swelling, diffuse body pain  Duration: 2 Days  Timing:  Acute  Location:   Quality: Aching  Severity: Severe  Modifying Factors: none  Associated Symptoms: denies any other associated signs or symptoms      Additional History (Context):Monica Akhtar is a 63 y.o. female with a pertinent history of hypertension, CHF, hyperlipidemia, dependence on supplemental oxygen, COPD, obesity, CKD, cocaine abuse, fibromyalgia, drug-seeking behavior, GERD, iron deficiency anemia, polysubstance abuse, chronic respiratory failure with hypoxia, and NSTEMI who presents from home via EMS to the emergency department for evaluation of bilateral leg swelling as well as shortness of breath for the past few days. Patient reports her legs have never been swollen in the past.  Review of record indicates that this is not the case. Patient has been seen many times for leg swelling and shortness of breath. Patient reports no recent injury or trauma. Patient denies drug or alcohol use recently. No recent fevers, chills, chest pain, cough, Covid exposures, loss of taste or smell. Patient reports no treatments prior to arrival.    PCP:  Adam Zelaya MD        Current Outpatient Medications   Medication Sig Dispense Refill    clindamycin (CLEOCIN) 300 mg capsule Take 1 Cap by mouth four (4) times daily for 10 days. 40 Cap 0    doxycycline (VIBRA-TABS) 100 mg tablet Take 1 Tab by mouth two (2) times a day for 7 days. 14 Tab 0    furosemide (Lasix) 40 mg tablet Take 1 Tab by mouth daily for 10 days. 10 Tab 0    albuterol (PROVENTIL VENTOLIN) 2.5 mg /3 mL (0.083 %) nebu 3 mL by Nebulization route every four (4) hours as needed for Wheezing.  30 Each 0    Nebulizer & Compressor machine 1 Each by Does Not Apply route as needed for Wheezing or Shortness of Breath. 1 Each 0    ALPRAZolam (Xanax) 1 mg tablet Take 1 Tab by mouth every eight (8) hours as needed for Anxiety. Max Daily Amount: 3 mg. 8 Tab 0    predniSONE (DELTASONE) 20 mg tablet Take 60 mg by mouth daily (with breakfast). 5 Tab 0    fluticasone furoate-vilanteroL (Breo Ellipta) 200-25 mcg/dose inhaler Take 1 Puff by inhalation daily. Indications: controller medication for asthma 1 Inhaler 3    nitroglycerin (NITROSTAT) 0.4 mg SL tablet Take 1 Tab by mouth every five (5) minutes as needed for Chest Pain. Sit/Lay down then put one tab under the tongue every 5 minutes as needed for chest pain for 3 doses 1 Bottle 0    aspirin delayed-release 81 mg tablet Take 1 Tab by mouth daily. 30 Tab 0    montelukast (SINGULAIR) 10 mg tablet Take 1 Tab by mouth nightly. Indications: controller medication for asthma 30 Tab 1    roflumilast (DALIRESP) 500 mcg tab tablet Take 1 Tab by mouth daily. 30 Tab 3    citalopram (CeleXA) 20 mg tablet Take 1 Tab by mouth every morning. 30 Tab 0    OXYGEN-AIR DELIVERY SYSTEMS 3 L by IntraNASal route as needed for Other (sob).  atorvastatin (LIPITOR) 20 mg tablet Take 1 Tab by mouth nightly. 30 Tab 0    naloxone (NARCAN) 4 mg/actuation nasal spray Use 1 spray intranasally, then discard. Repeat with new spray every 2 min as needed for opioid overdose symptoms, alternating nostrils.  1 Each 0       Past History     Past Medical History:  Past Medical History:   Diagnosis Date    CHF (congestive heart failure) (HCC)     Chronic respiratory failure with hypoxia ([facility] [address] 9/7/2020    CKD (chronic kidney disease) stage 3, GFR 30-59 ml/min 10/31/2019    Cocaine abuse ([facility] [address] 11/26/2017    COPD (chronic obstructive pulmonary disease) with chronic bronchitis ([facility] [address] 12/19/2017    Dependence on supplemental oxygen     Depression 3/22/2020    Drug-seeking behavior 11/19/2016    Endocrine disease thyroid issues    Essential hypertension 3/10/2017    Fe deficiency anemia 10/27/2012    Fibromyalgia 12/16/2016    Gastroesophageal reflux disease without esophagitis 10/10/2016    Gastrointestinal disorder     \"blockage in my stomach\"    Hypercholesterolemia     Hypertension     Hypertensive heart failure ([address] 12/19/2017    Morbid obesity due to excess calories ([address] 3/10/2017    NSTEMI (non-ST elevated myocardial infarction) ([address] 3/22/2020    On home O2 12/3/2015    Overview:  2L at home per pt for approx 8 years    Polysubstance abuse ([address] 9/15/2018    Respiratory failure ([address] 1/11/2017    S/P hernia repair 10/31/2019    Sleep apnea 12/19/2017    T wave inversion in EKG 3/9/2019    Tobacco use 1/54/2588    Uncomplicated severe persistent asthma 12/13/2016    Vocal cord disease 12/7/2016       Past Surgical History:  Past Surgical History:   Procedure Laterality Date    HX GI      Exploratory laparotomy with lysis of adhesions and primary repair of incarcerated umbilical hernia       Family History:  No family history on file. Social History:  Social History     Tobacco Use    Smoking status: Former Smoker     Packs/day: 0.25    Smokeless tobacco: Current User    Tobacco comment: Pt states she has not had money to buy cigarettes in the past month   Substance Use Topics    Alcohol use: No     Comment: socially    Drug use: Not Currently     Types: Heroin     Comment: pt reports using approximately a month ago       Allergies:  No Known Allergies      Review of Systems       Review of Systems   Constitutional: Negative for chills and fever. HENT: Negative for congestion, rhinorrhea and sore throat. Respiratory: Positive for shortness of breath. Negative for cough. Cardiovascular: Positive for leg swelling. Negative for chest pain. Gastrointestinal: Negative for abdominal pain, blood in stool, constipation, diarrhea, nausea and vomiting.    Genitourinary: Negative for dysuria, frequency and hematuria. Musculoskeletal: Negative for back pain and myalgias. Skin: Negative for rash and wound. Neurological: Negative for dizziness and headaches. All other systems reviewed and are negative. Physical Exam     Visit Vitals  BP (!) 196/84 (BP 1 Location: Right arm, BP Patient Position: Sitting)   Pulse 89   Temp 98.7 °F (37.1 °C)   Resp 19   Ht 5' (1.524 m)   Wt 67.6 kg (149 lb)   LMP 04/14/2009   SpO2 100%   BMI 29.10 kg/m²       Physical Exam  Vitals signs and nursing note reviewed. Constitutional:       General: She is in acute distress. Appearance: She is well-developed. Comments: Patient is combative, agitated, and yelling out obscenities   HENT:      Head: Normocephalic and atraumatic. Nose: No congestion or rhinorrhea. Eyes:      Conjunctiva/sclera: Conjunctivae normal.   Neck:      Musculoskeletal: Normal range of motion and neck supple. Cardiovascular:      Rate and Rhythm: Normal rate and regular rhythm. Heart sounds: Normal heart sounds. No murmur. No friction rub. No gallop. Pulmonary:      Effort: Pulmonary effort is normal. No respiratory distress. Breath sounds: No stridor. Wheezing present. No rhonchi or rales. Comments: Inspiratory and expiratory wheezes  Chest:      Chest wall: No tenderness. Abdominal:      General: Bowel sounds are normal. There is no distension. Palpations: Abdomen is soft. Tenderness: There is no abdominal tenderness. There is no guarding or rebound. Musculoskeletal:         General: No swelling, tenderness, deformity or signs of injury. Right lower leg: Edema present. Left lower leg: Edema present. Comments: Bilateral lower extremity edema with mottling noted to bilateral lower legs. Biphasic dopplerable pulses noted to bilateral dorsalis pedis and posterior arteries. Patient is moderately tender to light touch of bilateral lower extremities.   There does appear to be some erythema as well as some scattered bullae to bilateral lower legs   Skin:     General: Skin is warm and dry. Neurological:      Mental Status: She is alert and oriented to person, place, and time. Deep Tendon Reflexes: Reflexes are normal and symmetric. Diagnostic Study Results     Labs -  Recent Results (from the past 12 hour(s))   EKG, 12 LEAD, INITIAL    Collection Time: 02/24/21  8:22 AM   Result Value Ref Range    Ventricular Rate 94 BPM    Atrial Rate 94 BPM    P-R Interval 132 ms    QRS Duration 82 ms    Q-T Interval 390 ms    QTC Calculation (Bezet) 487 ms    Calculated P Axis 44 degrees    Calculated R Axis 31 degrees    Calculated T Axis 57 degrees    Diagnosis       Normal sinus rhythm  Possible Left atrial enlargement  Left ventricular hypertrophy  Cannot rule out Septal infarct (cited on or before 17-FEB-2021)  Abnormal ECG  When compared with ECG of 17-FEB-2021 07:48,  premature ventricular complexes are no longer present  Inverted T waves have replaced nonspecific T wave abnormality in Lateral   leads  QT has lengthened     CARDIAC PANEL,(CK, CKMB & TROPONIN)    Collection Time: 02/24/21 10:37 AM   Result Value Ref Range    CK - MB 2.2 <3.6 ng/ml    CK-MB Index 3.9 0.0 - 4.0 %    CK 56 26 - 192 U/L    Troponin-I, QT <0.02 0.0 - 0.045 NG/ML   NT-PRO BNP    Collection Time: 02/24/21 10:37 AM   Result Value Ref Range    NT pro- 0 - 900 PG/ML   CBC WITH AUTOMATED DIFF    Collection Time: 02/24/21 10:37 AM   Result Value Ref Range    WBC 12.1 4.6 - 13.2 K/uL    RBC 4.33 4.20 - 5.30 M/uL    HGB 12.6 12.0 - 16.0 g/dL    HCT 38.7 35.0 - 45.0 %    MCV 89.4 74.0 - 97.0 FL    MCH 29.1 24.0 - 34.0 PG    MCHC 32.6 31.0 - 37.0 g/dL    RDW 14.2 11.6 - 14.5 %    PLATELET 230 237 - 545 K/uL    MPV 10.2 9.2 - 11.8 FL    NEUTROPHILS 83 (H) 40 - 73 %    LYMPHOCYTES 10 (L) 21 - 52 %    MONOCYTES 6 3 - 10 %    EOSINOPHILS 1 0 - 5 %    BASOPHILS 0 0 - 2 %    ABS.  NEUTROPHILS 10.0 (H) 1.8 - 8.0 K/UL    ABS. LYMPHOCYTES 1.3 0.9 - 3.6 K/UL    ABS. MONOCYTES 0.8 0.05 - 1.2 K/UL    ABS. EOSINOPHILS 0.1 0.0 - 0.4 K/UL    ABS. BASOPHILS 0.0 0.0 - 0.1 K/UL    DF AUTOMATED     METABOLIC PANEL, COMPREHENSIVE    Collection Time: 02/24/21 10:37 AM   Result Value Ref Range    Sodium 142 136 - 145 mmol/L    Potassium 3.5 3.5 - 5.5 mmol/L    Chloride 104 100 - 111 mmol/L    CO2 31 21 - 32 mmol/L    Anion gap 7 3.0 - 18 mmol/L    Glucose 85 74 - 99 mg/dL    BUN 21 (H) 7.0 - 18 MG/DL    Creatinine 0.91 0.6 - 1.3 MG/DL    BUN/Creatinine ratio 23 (H) 12 - 20      GFR est AA >60 >60 ml/min/1.73m2    GFR est non-AA >60 >60 ml/min/1.73m2    Calcium 9.5 8.5 - 10.1 MG/DL    Bilirubin, total 1.0 0.2 - 1.0 MG/DL    ALT (SGPT) 19 13 - 56 U/L    AST (SGOT) 15 10 - 38 U/L    Alk. phosphatase 97 45 - 117 U/L    Protein, total 7.8 6.4 - 8.2 g/dL    Albumin 3.9 3.4 - 5.0 g/dL    Globulin 3.9 2.0 - 4.0 g/dL    A-G Ratio 1.0 0.8 - 1.7     SED RATE (ESR)    Collection Time: 02/24/21 10:37 AM   Result Value Ref Range    Sed rate, automated 30 0 - 30 mm/hr   LACTIC ACID    Collection Time: 02/24/21 10:37 AM   Result Value Ref Range    Lactic acid 2.1 (HH) 0.4 - 2.0 MMOL/L       Radiologic Studies -   Xr Chest Port    Result Date: 2/24/2021  EXAM: XR CHEST PORT INDICATION: SOB COMPARISON: Recent prior FINDINGS: A portable AP radiograph of the chest was obtained at 0805 hours. The patient is on a cardiac monitor. Marked interval improvement in aeration since prior exam. Question minimal residual hazy basilar opacities. . Stable cardiac silhouette. .  No acute bone findings. . Question small left pleural opacity. Marked improvement in aeration. Question minimal residual pleural-parenchymal opacities as discussed. Medical Decision Making   I am the first provider for this patient. I reviewed the vital signs, available nursing notes, past medical history, past surgical history, family history and social history.     Vital Signs-Reviewed the patient's vital signs. Pulse Oximetry Analysis -  100% on room air (Interpretation)    EKG: Interpreted by the EP. Time Interpreted:    Rate:    Rhythm: Normal Sinus Rhythm, LVH   Interpretation:   Comparison:     Records Reviewed: Nursing Notes and Old Medical Records (Time of Review: 9:29 AM)    ED Course: Progress Notes, Reevaluation, and Consults:    Provider Notes (Medical Decision Making):   differential diagnosis: Vasculitis, cellulitis, COPD exacerbation, CHF exacerbation, ACS, arrhythmia, substance use, substance withdrawal, medication noncompliance    Plan: Patient presents via EMS with elevated blood pressure, agitated, hollering. Patient reports she has new onset leg swelling generalized body aches as well as some shortness of breath. Patient reports that she does not have Covid or any Covid symptoms. Examination reveals by lateral inspiratory and expiratory wheezes. Patient given breathing treatment, but is requesting Lasix as well. This has been ordered. I am concerned about the appearance of patient's bilateral lower legs. There appears to be mottling from the level of the knees to the mid shins bilaterally. Bilateral feet with biphasic dopplerable dorsalis pedis and posterior tibial pulses. Duplex study did not reveal any acute occlusive thrombi, but study was technically difficult due to edema and patient not tolerating the exam.  I ordered a CTA with runoff to bilateral lower extremities to evaluate mottling. However, patient suddenly refused to stay, demanded her IV be removed. Attempted to change patient's mind. Patient was also evaluated by Dr. Aquilino Robbins, who also recommended patient stay for evaluation. Patient refused. My plan was to discharge patient with antibiotics, which will be sent to patient's preferred pharmacy. However, patient left before receiving her paperwork. Lactic was elevated. There was no overall leukocytosis. Stable CMP.   Troponin within normal limits. proBNP within normal limits. EKG without STEMI. Patient was alert and oriented x4 upon arrival and at the time of her departure. Patient demonstrates understanding of the risks associated with her leaving our department prematurely without a full work-up and treatment. Diagnosis     Clinical Impression:   1. Bilateral leg edema    2. SOB (shortness of breath)        Disposition: AMA    Follow-up Information    None          Discharge Medication List as of 2/24/2021  1:24 PM      CONTINUE these medications which have NOT CHANGED    Details   doxycycline (VIBRA-TABS) 100 mg tablet Take 1 Tab by mouth two (2) times a day for 7 days. , Normal, Disp-14 Tab, R-0      furosemide (Lasix) 40 mg tablet Take 1 Tab by mouth daily for 10 days. , Normal, Disp-10 Tab, R-0      albuterol (PROVENTIL VENTOLIN) 2.5 mg /3 mL (0.083 %) nebu 3 mL by Nebulization route every four (4) hours as needed for Wheezing., Print, Disp-30 Each, R-0      Nebulizer & Compressor machine 1 Each by Does Not Apply route as needed for Wheezing or Shortness of Breath., Print, Disp-1 Each, R-0      ALPRAZolam (Xanax) 1 mg tablet Take 1 Tab by mouth every eight (8) hours as needed for Anxiety. Max Daily Amount: 3 mg., Normal, Disp-8 Tab, R-0      predniSONE (DELTASONE) 20 mg tablet Take 60 mg by mouth daily (with breakfast). , Print, Disp-5 Tab, R-0      fluticasone furoate-vilanteroL (Breo Ellipta) 200-25 mcg/dose inhaler Take 1 Puff by inhalation daily. Indications: controller medication for asthma, Print, Disp-1 Inhaler, R-3      nitroglycerin (NITROSTAT) 0.4 mg SL tablet Take 1 Tab by mouth every five (5) minutes as needed for Chest Pain. Sit/Lay down then put one tab under the tongue every 5 minutes as needed for chest pain for 3 doses, Normal, Disp-1 Bottle, R-0      aspirin delayed-release 81 mg tablet Take 1 Tab by mouth daily. , Normal, Disp-30 Tab, R-0      montelukast (SINGULAIR) 10 mg tablet Take 1 Tab by mouth nightly.  Indications: controller medication for asthma, Print, Disp-30 Tab, R-1      roflumilast (DALIRESP) 500 mcg tab tablet Take 1 Tab by mouth daily. , Print, Disp-30 Tab, R-3      citalopram (CeleXA) 20 mg tablet Take 1 Tab by mouth every morning., Normal, Disp-30 Tab,R-0      OXYGEN-AIR DELIVERY SYSTEMS 3 L by IntraNASal route as needed for Other (sob). , Historical Med      atorvastatin (LIPITOR) 20 mg tablet Take 1 Tab by mouth nightly. , Print, Disp-30 Tab, R-0      naloxone (NARCAN) 4 mg/actuation nasal spray Use 1 spray intranasally, then discard. Repeat with new spray every 2 min as needed for opioid overdose symptoms, alternating nostrils. , Print, Disp-1 Each, R-0           _______________________________    This note was dictated utilizing voice recognition software which may lead to typographical errors. I apologize in advance if the situation occurs. If questions arise please do not hesitate to contact me or call our department.   Heron Ragsdale PA-C

## 2021-02-24 NOTE — ED TRIAGE NOTES
Per medic: The patient has SOB since she woke up. Patient has a hx of COPD. Patient is on O2 regulator at 4-5L. Patient is AxOx4. Up ad tess.

## 2021-02-24 NOTE — ED NOTES
SEPIDEH received from [facility], [address] [facility] [place]. Pt updated on plan of care for need of a larger IV for a CT. Pt agreed to let ED tech try to obtain an IV using US.

## 2021-02-24 NOTE — ED NOTES
Pt refusing any further IV attempts. Pt yelling at staff and being verbally abusive. MD and PA and charge nurse are at the bedside.

## 2021-02-24 NOTE — ED NOTES
Pt pulled her IV out and eloped from her room yelling \"fuck these bitches\". Charge nurse made aware.

## 2021-02-25 PROCEDURE — 99285 EMERGENCY DEPT VISIT HI MDM: CPT

## 2021-02-26 ENCOUNTER — HOSPITAL ENCOUNTER (INPATIENT)
Age: 56
LOS: 3 days | Discharge: HOME | DRG: 140 | End: 2021-03-01
Attending: EMERGENCY MEDICINE | Admitting: INTERNAL MEDICINE
Payer: MEDICAID

## 2021-02-26 ENCOUNTER — APPOINTMENT (OUTPATIENT)
Dept: VASCULAR SURGERY | Age: 56
DRG: 140 | End: 2021-02-26
Attending: INTERNAL MEDICINE
Payer: MEDICAID

## 2021-02-26 DIAGNOSIS — L03.116 CELLULITIS OF BOTH LOWER EXTREMITIES: Primary | ICD-10-CM

## 2021-02-26 DIAGNOSIS — L03.115 CELLULITIS OF BOTH LOWER EXTREMITIES: Primary | ICD-10-CM

## 2021-02-26 DIAGNOSIS — L03.116 BILATERAL LOWER LEG CELLULITIS: ICD-10-CM

## 2021-02-26 DIAGNOSIS — L03.115 BILATERAL LOWER LEG CELLULITIS: ICD-10-CM

## 2021-02-26 DIAGNOSIS — E87.6 HYPOKALEMIA: ICD-10-CM

## 2021-02-26 DIAGNOSIS — J44.1 COPD EXACERBATION (HCC): ICD-10-CM

## 2021-02-26 LAB
ANION GAP SERPL CALC-SCNC: 6 MMOL/L (ref 3–18)
BASOPHILS # BLD: 0 K/UL (ref 0–0.1)
BASOPHILS NFR BLD: 0 % (ref 0–2)
BUN SERPL-MCNC: 21 MG/DL (ref 7–18)
BUN/CREAT SERPL: 16 (ref 12–20)
CALCIUM SERPL-MCNC: 8.7 MG/DL (ref 8.5–10.1)
CHLORIDE SERPL-SCNC: 105 MMOL/L (ref 100–111)
CO2 SERPL-SCNC: 31 MMOL/L (ref 21–32)
COVID-19 RAPID TEST, COVR: NOT DETECTED
CREAT SERPL-MCNC: 1.3 MG/DL (ref 0.6–1.3)
DIFFERENTIAL METHOD BLD: ABNORMAL
EOSINOPHIL # BLD: 0.1 K/UL (ref 0–0.4)
EOSINOPHIL NFR BLD: 1 % (ref 0–5)
ERYTHROCYTE [DISTWIDTH] IN BLOOD BY AUTOMATED COUNT: 14.7 % (ref 11.6–14.5)
GLUCOSE SERPL-MCNC: 107 MG/DL (ref 74–99)
HCT VFR BLD AUTO: 33.4 % (ref 35–45)
HGB BLD-MCNC: 10.8 G/DL (ref 12–16)
IRON SATN MFR SERPL: 12 % (ref 20–50)
IRON SERPL-MCNC: 32 UG/DL (ref 50–175)
LYMPHOCYTES # BLD: 3 K/UL (ref 0.9–3.6)
LYMPHOCYTES NFR BLD: 31 % (ref 21–52)
MCH RBC QN AUTO: 28.9 PG (ref 24–34)
MCHC RBC AUTO-ENTMCNC: 32.3 G/DL (ref 31–37)
MCV RBC AUTO: 89.3 FL (ref 74–97)
MONOCYTES # BLD: 0.6 K/UL (ref 0.05–1.2)
MONOCYTES NFR BLD: 6 % (ref 3–10)
NEUTS SEG # BLD: 6.1 K/UL (ref 1.8–8)
NEUTS SEG NFR BLD: 62 % (ref 40–73)
PLATELET # BLD AUTO: 263 K/UL (ref 135–420)
PMV BLD AUTO: 10.6 FL (ref 9.2–11.8)
POTASSIUM SERPL-SCNC: 3 MMOL/L (ref 3.5–5.5)
RBC # BLD AUTO: 3.74 M/UL (ref 4.2–5.3)
SARS-COV-2, COV2: NORMAL
SODIUM SERPL-SCNC: 142 MMOL/L (ref 136–145)
SOURCE, COVRS: NORMAL
TIBC SERPL-MCNC: 260 UG/DL (ref 250–450)
WBC # BLD AUTO: 9.9 K/UL (ref 4.6–13.2)

## 2021-02-26 PROCEDURE — 96375 TX/PRO/DX INJ NEW DRUG ADDON: CPT

## 2021-02-26 PROCEDURE — 99233 SBSQ HOSP IP/OBS HIGH 50: CPT | Performed by: INTERNAL MEDICINE

## 2021-02-26 PROCEDURE — 74011250637 HC RX REV CODE- 250/637: Performed by: INTERNAL MEDICINE

## 2021-02-26 PROCEDURE — 87635 SARS-COV-2 COVID-19 AMP PRB: CPT

## 2021-02-26 PROCEDURE — 2709999900 HC NON-CHARGEABLE SUPPLY

## 2021-02-26 PROCEDURE — 80048 BASIC METABOLIC PNL TOTAL CA: CPT

## 2021-02-26 PROCEDURE — 97161 PT EVAL LOW COMPLEX 20 MIN: CPT

## 2021-02-26 PROCEDURE — 99223 1ST HOSP IP/OBS HIGH 75: CPT | Performed by: INTERNAL MEDICINE

## 2021-02-26 PROCEDURE — 74011250636 HC RX REV CODE- 250/636: Performed by: INTERNAL MEDICINE

## 2021-02-26 PROCEDURE — 96374 THER/PROPH/DIAG INJ IV PUSH: CPT

## 2021-02-26 PROCEDURE — 77030038269 HC DRN EXT URIN PURWCK BARD -A

## 2021-02-26 PROCEDURE — 77010033678 HC OXYGEN DAILY

## 2021-02-26 PROCEDURE — 83540 ASSAY OF IRON: CPT

## 2021-02-26 PROCEDURE — 65660000000 HC RM CCU STEPDOWN

## 2021-02-26 PROCEDURE — 85025 COMPLETE CBC W/AUTO DIFF WBC: CPT

## 2021-02-26 PROCEDURE — 74011250636 HC RX REV CODE- 250/636: Performed by: EMERGENCY MEDICINE

## 2021-02-26 PROCEDURE — 74011000250 HC RX REV CODE- 250: Performed by: EMERGENCY MEDICINE

## 2021-02-26 RX ORDER — IPRATROPIUM BROMIDE AND ALBUTEROL SULFATE 2.5; .5 MG/3ML; MG/3ML
3 SOLUTION RESPIRATORY (INHALATION)
Status: DISCONTINUED | OUTPATIENT
Start: 2021-02-26 | End: 2021-02-26

## 2021-02-26 RX ORDER — ATORVASTATIN CALCIUM 20 MG/1
20 TABLET, FILM COATED ORAL
Status: DISCONTINUED | OUTPATIENT
Start: 2021-02-26 | End: 2021-03-01 | Stop reason: HOSPADM

## 2021-02-26 RX ORDER — ONDANSETRON 2 MG/ML
4 INJECTION INTRAMUSCULAR; INTRAVENOUS
Status: DISCONTINUED | OUTPATIENT
Start: 2021-02-26 | End: 2021-03-01 | Stop reason: HOSPADM

## 2021-02-26 RX ORDER — ENOXAPARIN SODIUM 100 MG/ML
40 INJECTION SUBCUTANEOUS DAILY
Status: DISCONTINUED | OUTPATIENT
Start: 2021-02-26 | End: 2021-03-01 | Stop reason: HOSPADM

## 2021-02-26 RX ORDER — MONTELUKAST SODIUM 10 MG/1
10 TABLET ORAL
Status: DISCONTINUED | OUTPATIENT
Start: 2021-02-26 | End: 2021-03-01 | Stop reason: HOSPADM

## 2021-02-26 RX ORDER — POTASSIUM CHLORIDE 20 MEQ/1
40 TABLET, EXTENDED RELEASE ORAL
Status: ACTIVE | OUTPATIENT
Start: 2021-02-26 | End: 2021-02-26

## 2021-02-26 RX ORDER — SODIUM CHLORIDE 0.9 % (FLUSH) 0.9 %
5-40 SYRINGE (ML) INJECTION EVERY 8 HOURS
Status: DISCONTINUED | OUTPATIENT
Start: 2021-02-26 | End: 2021-03-01 | Stop reason: HOSPADM

## 2021-02-26 RX ORDER — MORPHINE SULFATE 2 MG/ML
2 INJECTION, SOLUTION INTRAMUSCULAR; INTRAVENOUS
Status: DISCONTINUED | OUTPATIENT
Start: 2021-02-26 | End: 2021-02-27

## 2021-02-26 RX ORDER — ASPIRIN 81 MG/1
81 TABLET ORAL DAILY
Status: DISCONTINUED | OUTPATIENT
Start: 2021-02-26 | End: 2021-03-01 | Stop reason: HOSPADM

## 2021-02-26 RX ORDER — SODIUM CHLORIDE 0.9 % (FLUSH) 0.9 %
5-40 SYRINGE (ML) INJECTION AS NEEDED
Status: DISCONTINUED | OUTPATIENT
Start: 2021-02-26 | End: 2021-03-01 | Stop reason: HOSPADM

## 2021-02-26 RX ORDER — MORPHINE SULFATE 4 MG/ML
4 INJECTION, SOLUTION INTRAMUSCULAR; INTRAVENOUS
Status: COMPLETED | OUTPATIENT
Start: 2021-02-26 | End: 2021-02-26

## 2021-02-26 RX ORDER — GABAPENTIN 300 MG/1
300 CAPSULE ORAL 3 TIMES DAILY
Status: DISCONTINUED | OUTPATIENT
Start: 2021-02-26 | End: 2021-02-27

## 2021-02-26 RX ORDER — FAMOTIDINE 20 MG/1
20 TABLET, FILM COATED ORAL DAILY
Status: DISCONTINUED | OUTPATIENT
Start: 2021-02-26 | End: 2021-02-27

## 2021-02-26 RX ORDER — SODIUM CHLORIDE, SODIUM LACTATE, POTASSIUM CHLORIDE, CALCIUM CHLORIDE 600; 310; 30; 20 MG/100ML; MG/100ML; MG/100ML; MG/100ML
100 INJECTION, SOLUTION INTRAVENOUS CONTINUOUS
Status: DISPENSED | OUTPATIENT
Start: 2021-02-26 | End: 2021-02-27

## 2021-02-26 RX ORDER — IPRATROPIUM BROMIDE AND ALBUTEROL SULFATE 2.5; .5 MG/3ML; MG/3ML
3 SOLUTION RESPIRATORY (INHALATION)
Status: DISCONTINUED | OUTPATIENT
Start: 2021-02-26 | End: 2021-03-01 | Stop reason: HOSPADM

## 2021-02-26 RX ORDER — OXYCODONE AND ACETAMINOPHEN 7.5; 325 MG/1; MG/1
1 TABLET ORAL
Status: DISCONTINUED | OUTPATIENT
Start: 2021-02-26 | End: 2021-02-27 | Stop reason: SDDI

## 2021-02-26 RX ORDER — ALPRAZOLAM 1 MG/1
1 TABLET ORAL
Status: DISCONTINUED | OUTPATIENT
Start: 2021-02-26 | End: 2021-03-01 | Stop reason: HOSPADM

## 2021-02-26 RX ORDER — CITALOPRAM 20 MG/1
20 TABLET, FILM COATED ORAL
Status: DISCONTINUED | OUTPATIENT
Start: 2021-02-26 | End: 2021-03-01 | Stop reason: HOSPADM

## 2021-02-26 RX ORDER — VANCOMYCIN/0.9 % SOD CHLORIDE 1.5G/250ML
1500 PLASTIC BAG, INJECTION (ML) INTRAVENOUS ONCE
Status: COMPLETED | OUTPATIENT
Start: 2021-02-26 | End: 2021-02-26

## 2021-02-26 RX ORDER — CLONIDINE HYDROCHLORIDE 0.1 MG/1
0.2 TABLET ORAL 2 TIMES DAILY
Status: DISCONTINUED | OUTPATIENT
Start: 2021-02-26 | End: 2021-02-26

## 2021-02-26 RX ORDER — POLYETHYLENE GLYCOL 3350 17 G/17G
17 POWDER, FOR SOLUTION ORAL DAILY PRN
Status: DISCONTINUED | OUTPATIENT
Start: 2021-02-26 | End: 2021-03-01 | Stop reason: HOSPADM

## 2021-02-26 RX ORDER — ACETAMINOPHEN 325 MG/1
650 TABLET ORAL
Status: DISCONTINUED | OUTPATIENT
Start: 2021-02-26 | End: 2021-03-01 | Stop reason: DRUGHIGH

## 2021-02-26 RX ORDER — FUROSEMIDE 40 MG/1
40 TABLET ORAL DAILY
Status: DISCONTINUED | OUTPATIENT
Start: 2021-02-26 | End: 2021-03-01 | Stop reason: HOSPADM

## 2021-02-26 RX ORDER — ACETAMINOPHEN 650 MG/1
650 SUPPOSITORY RECTAL
Status: DISCONTINUED | OUTPATIENT
Start: 2021-02-26 | End: 2021-03-01 | Stop reason: HOSPADM

## 2021-02-26 RX ORDER — POTASSIUM CHLORIDE 750 MG/1
20 TABLET, FILM COATED, EXTENDED RELEASE ORAL DAILY
Status: DISCONTINUED | OUTPATIENT
Start: 2021-02-27 | End: 2021-03-01 | Stop reason: HOSPADM

## 2021-02-26 RX ORDER — PROMETHAZINE HYDROCHLORIDE 25 MG/1
12.5 TABLET ORAL
Status: DISCONTINUED | OUTPATIENT
Start: 2021-02-26 | End: 2021-03-01 | Stop reason: HOSPADM

## 2021-02-26 RX ORDER — CLONIDINE HYDROCHLORIDE 0.1 MG/1
0.2 TABLET ORAL EVERY 12 HOURS
Status: DISCONTINUED | OUTPATIENT
Start: 2021-02-26 | End: 2021-03-01 | Stop reason: HOSPADM

## 2021-02-26 RX ADMIN — ALPRAZOLAM 1 MG: 1 TABLET ORAL at 10:24

## 2021-02-26 RX ADMIN — CLONIDINE HYDROCHLORIDE 0.2 MG: 0.1 TABLET ORAL at 23:39

## 2021-02-26 RX ADMIN — SODIUM CHLORIDE, SODIUM LACTATE, POTASSIUM CHLORIDE, AND CALCIUM CHLORIDE 100 ML/HR: 600; 310; 30; 20 INJECTION, SOLUTION INTRAVENOUS at 06:41

## 2021-02-26 RX ADMIN — OXYCODONE HYDROCHLORIDE AND ACETAMINOPHEN 1 TABLET: 7.5; 325 TABLET ORAL at 15:39

## 2021-02-26 RX ADMIN — CLONIDINE HYDROCHLORIDE 0.2 MG: 0.1 TABLET ORAL at 10:34

## 2021-02-26 RX ADMIN — FAMOTIDINE 20 MG: 20 TABLET ORAL at 18:36

## 2021-02-26 RX ADMIN — ATORVASTATIN CALCIUM 20 MG: 20 TABLET, FILM COATED ORAL at 23:39

## 2021-02-26 RX ADMIN — ALPRAZOLAM 1 MG: 1 TABLET ORAL at 18:36

## 2021-02-26 RX ADMIN — METHYLPREDNISOLONE SODIUM SUCCINATE 40 MG: 40 INJECTION, POWDER, FOR SOLUTION INTRAMUSCULAR; INTRAVENOUS at 15:39

## 2021-02-26 RX ADMIN — CITALOPRAM HYDROBROMIDE 20 MG: 20 TABLET ORAL at 06:49

## 2021-02-26 RX ADMIN — MONTELUKAST 10 MG: 10 TABLET, FILM COATED ORAL at 23:39

## 2021-02-26 RX ADMIN — Medication 10 ML: at 07:01

## 2021-02-26 RX ADMIN — Medication 10 ML: at 15:43

## 2021-02-26 RX ADMIN — GABAPENTIN 300 MG: 300 CAPSULE ORAL at 23:39

## 2021-02-26 RX ADMIN — METHYLPREDNISOLONE SODIUM SUCCINATE 40 MG: 40 INJECTION, POWDER, FOR SOLUTION INTRAMUSCULAR; INTRAVENOUS at 23:40

## 2021-02-26 RX ADMIN — MORPHINE SULFATE 4 MG: 4 INJECTION, SOLUTION INTRAMUSCULAR; INTRAVENOUS at 03:45

## 2021-02-26 RX ADMIN — OXYCODONE HYDROCHLORIDE AND ACETAMINOPHEN 1 TABLET: 7.5; 325 TABLET ORAL at 09:25

## 2021-02-26 RX ADMIN — GABAPENTIN 300 MG: 300 CAPSULE ORAL at 16:47

## 2021-02-26 RX ADMIN — FUROSEMIDE 40 MG: 40 TABLET ORAL at 09:25

## 2021-02-26 RX ADMIN — OXYCODONE HYDROCHLORIDE AND ACETAMINOPHEN 1 TABLET: 7.5; 325 TABLET ORAL at 23:39

## 2021-02-26 RX ADMIN — Medication 10 ML: at 23:40

## 2021-02-26 RX ADMIN — VANCOMYCIN HYDROCHLORIDE 1500 MG: 10 INJECTION, POWDER, LYOPHILIZED, FOR SOLUTION INTRAVENOUS at 09:29

## 2021-02-26 RX ADMIN — SODIUM CHLORIDE, SODIUM LACTATE, POTASSIUM CHLORIDE, AND CALCIUM CHLORIDE 100 ML/HR: 600; 310; 30; 20 INJECTION, SOLUTION INTRAVENOUS at 18:36

## 2021-02-26 RX ADMIN — ROFLUMILAST 500 MCG: 500 TABLET ORAL at 09:27

## 2021-02-26 RX ADMIN — WATER 1 G: 1 INJECTION INTRAMUSCULAR; INTRAVENOUS; SUBCUTANEOUS at 03:45

## 2021-02-26 RX ADMIN — Medication 81 MG: at 09:25

## 2021-02-26 RX ADMIN — METHYLPREDNISOLONE SODIUM SUCCINATE 40 MG: 40 INJECTION, POWDER, FOR SOLUTION INTRAMUSCULAR; INTRAVENOUS at 06:49

## 2021-02-26 NOTE — ED PROVIDER NOTES
EMERGENCY DEPARTMENT HISTORY AND PHYSICAL EXAM    1:06 AM      Date: 2/26/2021  Patient Name: Gary Barth    History of Presenting Illness     Chief Complaint   Patient presents with    Leg Pain     10/10 pain ln bilateral lower legs         History Provided By: Patient    Additional History (Context): Gary Barth is a 63 y.o. female with Past medical history of CHF, cocaine abuse, COPD, drug-seeking behavior, who presents with chief complaint of bilateral lower extremity redness and moderate to severe pain for the past 3 to 4 days. Patient admits that she was here yesterday and discussed being admitted but left AMA. She also complains of shortness of breath and wheezing. Patient arrived by ambulance. EMS states that the arrived at the scene and she had respiratory distress with wheezing. They gave multiple duo nebs, Solu-Medrol, and magnesium. She denies any chest pain, flank pain, back pain, weakness, numbness, fever, and no other complaint.     PCP: Amna Bain MD        Past History     Past Medical History:  Past Medical History:   Diagnosis Date    CHF (congestive heart failure) ([address]     Chronic respiratory failure with hypoxia ([address] 9/7/2020    CKD (chronic kidney disease) stage 3, GFR 30-59 ml/min 10/31/2019    Cocaine abuse ([address] 11/26/2017    COPD (chronic obstructive pulmonary disease) with chronic bronchitis ([address] 12/19/2017    Dependence on supplemental oxygen     Depression 3/22/2020    Drug-seeking behavior 11/19/2016    Endocrine disease     thyroid issues    Essential hypertension 3/10/2017    Fe deficiency anemia 10/27/2012    Fibromyalgia 12/16/2016    Gastroesophageal reflux disease without esophagitis 10/10/2016    Gastrointestinal disorder     \"blockage in my stomach\"    Hypercholesterolemia     Hypertension     Hypertensive heart failure ([address] 12/19/2017    Morbid obesity due to excess calories ([address] 3/10/2017    NSTEMI (non-ST elevated myocardial infarction) ([facility] 3/22/2020    On home O2 12/3/2015    Overview:  2L at home per pt for approx 8 years    Polysubstance abuse ([facility] [facility] 9/15/2018    Respiratory failure ([facility] 1/11/2017    S/P hernia repair 10/31/2019    Sleep apnea 12/19/2017    T wave inversion in EKG 3/9/2019    Tobacco use 8/75/8632    Uncomplicated severe persistent asthma 12/13/2016    Vocal cord disease 12/7/2016       Past Surgical History:  Past Surgical History:   Procedure Laterality Date    HX GI      Exploratory laparotomy with lysis of adhesions and primary repair of incarcerated umbilical hernia       Family History:  No family history on file. Social History:  Social History     Tobacco Use    Smoking status: Former Smoker     Packs/day: 0.25    Smokeless tobacco: Current User    Tobacco comment: Pt states she has not had money to buy cigarettes in the past month   Substance Use Topics    Alcohol use: No     Comment: socially    Drug use: Not Currently     Types: Heroin     Comment: pt reports using approximately a month ago       Allergies:  No Known Allergies      Review of Systems       Review of Systems   Constitutional: Negative for chills and fever. HENT: Negative for congestion, rhinorrhea, sore throat and trouble swallowing. Eyes: Negative for visual disturbance. Respiratory: Positive for shortness of breath and wheezing. Negative for cough. Cardiovascular: Negative for chest pain. Gastrointestinal: Negative for abdominal pain, nausea and vomiting. Endocrine: Negative for polyuria. Genitourinary: Negative for dysuria. Musculoskeletal: Negative for arthralgias and neck stiffness. Bilateral leg pain and redness   Skin: Negative for pallor and rash. Neurological: Negative for dizziness, weakness, numbness and headaches. Hematological: Does not bruise/bleed easily. Psychiatric/Behavioral: Negative for confusion and dysphoric mood.    All other systems reviewed and are negative. Physical Exam     Visit Vitals  BP (!) 178/79   Pulse 79   Temp 98.2 °F (36.8 °C)   Resp 20   LMP 04/14/2009   SpO2 97%         Physical Exam  Vitals signs and nursing note reviewed. Constitutional:       General: She is not in acute distress. Appearance: She is well-developed. She is ill-appearing. She is not toxic-appearing or diaphoretic. HENT:      Head: Normocephalic and atraumatic. Eyes:      General: No scleral icterus. Conjunctiva/sclera: Conjunctivae normal.      Pupils: Pupils are equal, round, and reactive to light. Neck:      Musculoskeletal: Normal range of motion and neck supple. Cardiovascular:      Rate and Rhythm: Normal rate. Heart sounds: Normal heart sounds. Comments: Capillary refill < 3 seconds  Pulmonary:      Effort: Respiratory distress present. Breath sounds: No stridor. Wheezing present. No rhonchi. Abdominal:      General: Bowel sounds are normal. There is no distension. Palpations: Abdomen is soft. Tenderness: There is no abdominal tenderness. Musculoskeletal: Normal range of motion. General: Tenderness present. No signs of injury. Comments: Circumferential erythema on the lower left extremity and tender to palpate. Healing scab on the lower leg    Also with erythema on the right lower extremity but not circumferential.  His leg is also tender to palpate    Symmetric dorsalis pedal pulses with normal capillary refill    Full range of motion bilateral upper and lower extremities   Lymphadenopathy:      Cervical: No cervical adenopathy. Skin:     General: Skin is warm and dry. Findings: Erythema present. Neurological:      General: No focal deficit present. Mental Status: She is alert and oriented to person, place, and time. Cranial Nerves: No cranial nerve deficit. Motor: No weakness. Coordination: Coordination normal.   Psychiatric:         Thought Content:  Thought content normal. Diagnostic Study Results     Labs -  Recent Results (from the past 12 hour(s))   CBC WITH AUTOMATED DIFF    Collection Time: 02/26/21 12:37 AM   Result Value Ref Range    WBC 9.9 4.6 - 13.2 K/uL    RBC 3.74 (L) 4.20 - 5.30 M/uL    HGB 10.8 (L) 12.0 - 16.0 g/dL    HCT 33.4 (L) 35.0 - 45.0 %    MCV 89.3 74.0 - 97.0 FL    MCH 28.9 24.0 - 34.0 PG    MCHC 32.3 31.0 - 37.0 g/dL    RDW 14.7 (H) 11.6 - 14.5 %    PLATELET 518 847 - 298 K/uL    MPV 10.6 9.2 - 11.8 FL    NEUTROPHILS 62 40 - 73 %    LYMPHOCYTES 31 21 - 52 %    MONOCYTES 6 3 - 10 %    EOSINOPHILS 1 0 - 5 %    BASOPHILS 0 0 - 2 %    ABS. NEUTROPHILS 6.1 1.8 - 8.0 K/UL    ABS. LYMPHOCYTES 3.0 0.9 - 3.6 K/UL    ABS. MONOCYTES 0.6 0.05 - 1.2 K/UL    ABS. EOSINOPHILS 0.1 0.0 - 0.4 K/UL    ABS. BASOPHILS 0.0 0.0 - 0.1 K/UL    DF AUTOMATED     METABOLIC PANEL, BASIC    Collection Time: 02/26/21 12:37 AM   Result Value Ref Range    Sodium 142 136 - 145 mmol/L    Potassium 3.0 (L) 3.5 - 5.5 mmol/L    Chloride 105 100 - 111 mmol/L    CO2 31 21 - 32 mmol/L    Anion gap 6 3.0 - 18 mmol/L    Glucose 107 (H) 74 - 99 mg/dL    BUN 21 (H) 7.0 - 18 MG/DL    Creatinine 1.30 0.6 - 1.3 MG/DL    BUN/Creatinine ratio 16 12 - 20      GFR est AA 51 (L) >60 ml/min/1.73m2    GFR est non-AA 42 (L) >60 ml/min/1.73m2    Calcium 8.7 8.5 - 10.1 MG/DL   SARS-COV-2    Collection Time: 02/26/21  3:37 AM   Result Value Ref Range    SARS-CoV-2 Please find results under separate order     COVID-19 RAPID TEST    Collection Time: 02/26/21  3:37 AM   Result Value Ref Range    Specimen source Nasopharyngeal      COVID-19 rapid test Not detected NOTD         Radiologic Studies -   No orders to display         Medical Decision Making   I am the first provider for this patient. I reviewed the vital signs, available nursing notes, past medical history, past surgical history, family history and social history. Vital Signs-Reviewed the patient's vital signs.     Pulse Oximetry Analysis -  97% on room air (Interpretation)        Records Reviewed: Nursing Notes and Old Medical Records (Time of Review: 1:06 AM)    Provider Notes (Medical Decision Making): DDx: Bilateral lower extremity cellulitis with left leg circumferential erythema. Acute CHF exacerbation, pneumonia, metabolic    Patient left AMA yesterday when discussing with her with her regarding admission by me. Patient states that she will stay for admission at this time. We will check labs, give analgesia, give broad-spectrum IV antibiotics, DuoNeb. Patient with wheezing but no coughing.     He had duplex yesterday which showed no acute DVT      MDM    Medications   potassium chloride (K-DUR, KLOR-CON) SR tablet 40 mEq (40 mEq Oral Refused 2/26/21 0421)   albuterol-ipratropium (DUO-NEB) 2.5 MG-0.5 MG/3 ML (3 mL Nebulization Refused 2/26/21 0836)   ALPRAZolam (XANAX) tablet 1 mg (1 mg Oral Given 2/26/21 1026)   aspirin delayed-release tablet 81 mg (81 mg Oral Given 2/26/21 0925)   atorvastatin (LIPITOR) tablet 20 mg (has no administration in time range)   citalopram (CELEXA) tablet 20 mg (20 mg Oral Given 2/26/21 0649)   arformoterol 15 mcg/budesonide 0.5 mg neb solution ( Nebulization Refused 2/26/21 0837)   furosemide (LASIX) tablet 40 mg (40 mg Oral Given 2/26/21 0925)   montelukast (SINGULAIR) tablet 10 mg (has no administration in time range)   roflumilast (DALIRESP) tablet 500 mcg (500 mcg Oral Given 2/26/21 0927)   methylPREDNISolone (PF) (SOLU-MEDROL) injection 40 mg (40 mg IntraVENous Given 2/26/21 0649)   lactated Ringers infusion (100 mL/hr IntraVENous New Bag 2/26/21 0641)   sodium chloride (NS) flush 5-40 mL (10 mL IntraVENous Given 2/26/21 0701)   sodium chloride (NS) flush 5-40 mL (has no administration in time range)   acetaminophen (TYLENOL) tablet 650 mg (has no administration in time range)     Or   acetaminophen (TYLENOL) suppository 650 mg (has no administration in time range)   polyethylene glycol (MIRALAX) packet 17 g (has no administration in time range)   promethazine (PHENERGAN) tablet 12.5 mg (has no administration in time range)     Or   ondansetron (ZOFRAN) injection 4 mg (has no administration in time range)   enoxaparin (LOVENOX) injection 40 mg (40 mg SubCUTAneous Refused 2/26/21 0925)   vancomycin (VANCOCIN) 1500 mg in  ml infusion (1,500 mg IntraVENous New Bag 2/26/21 0929)   oxyCODONE-acetaminophen (PERCOCET 7.5) 7.5-325 mg per tablet 1 Tab (1 Tab Oral Given 2/26/21 0925)   cefTRIAXone (ROCEPHIN) 1 g in sterile water (preservative free) 10 mL IV syringe (has no administration in time range)   . PLEASE ENTER THE PATIENT'S WEIGHT IN CONNECT CARE (has no administration in time range)   cloNIDine HCL (CATAPRES) tablet 0.2 mg (has no administration in time range)   morphine injection 4 mg (4 mg IntraVENous Given 2/26/21 0345)   cefTRIAXone (ROCEPHIN) 1 g in sterile water (preservative free) 10 mL IV syringe (1 g IntraVENous Given 2/26/21 0345)           ED Course: Progress Notes, Reevaluation, and Consults:  Negative Covid      Consult:  Discussed care with Dr. Whitney Johnson, Specialty: Hospitalist, standard discussion; including history of patients chief complaint, available diagnostic results, and treatment course. Accepts admission  4:31 AM, 2/25/2021     Critical care time:   I have spent 38 minutes of critical care time involved in lab review, consultations with specialist,  decision making, and documentation. During this entire length of time I was immediately available to the patient. Treated patient for acute COPD exacerbation, and bilateral lower extremity cellulitis with circumferential erythema    Critical care: The reason for providing this level of medical care for this critically ill patient was due to a critical illness that impaired one or more vital organ systems such that there was a high probability of imminent or life threatening deterioration in the patients condition.  This care involved high complexity decision making to assess, manipulate and support vital system functions, to treat this degree vital organ system failure and to prevent further life threatening deterioration of the patient's condition. Diagnosis     Clinical Impression:   1. Cellulitis of both lower extremities    2. Hypokalemia    3. COPD exacerbation ([facility]        Disposition: Admitted    Follow-up Information    None          Current Discharge Medication List      CONTINUE these medications which have NOT CHANGED    Details   fluticasone furoate-vilanteroL (Breo Ellipta) 200-25 mcg/dose inhaler Take 1 Puff by inhalation daily. Indications: controller medication for asthma  Qty: 1 Inhaler, Refills: 3      citalopram (CeleXA) 20 mg tablet Take 1 Tab by mouth every morning. Qty: 30 Tab, Refills: 0      atorvastatin (LIPITOR) 20 mg tablet Take 1 Tab by mouth nightly. Qty: 30 Tab, Refills: 0      clindamycin (CLEOCIN) 300 mg capsule Take 1 Cap by mouth four (4) times daily for 10 days. Qty: 40 Cap, Refills: 0      furosemide (Lasix) 40 mg tablet Take 1 Tab by mouth daily for 10 days. Qty: 10 Tab, Refills: 0      albuterol (PROVENTIL VENTOLIN) 2.5 mg /3 mL (0.083 %) nebu 3 mL by Nebulization route every four (4) hours as needed for Wheezing. Qty: 30 Each, Refills: 0      Nebulizer & Compressor machine 1 Each by Does Not Apply route as needed for Wheezing or Shortness of Breath. Qty: 1 Each, Refills: 0      ALPRAZolam (Xanax) 1 mg tablet Take 1 Tab by mouth every eight (8) hours as needed for Anxiety. Max Daily Amount: 3 mg. Qty: 8 Tab, Refills: 0    Associated Diagnoses: Anxiety state      predniSONE (DELTASONE) 20 mg tablet Take 60 mg by mouth daily (with breakfast). Qty: 5 Tab, Refills: 0      nitroglycerin (NITROSTAT) 0.4 mg SL tablet Take 1 Tab by mouth every five (5) minutes as needed for Chest Pain.  Sit/Lay down then put one tab under the tongue every 5 minutes as needed for chest pain for 3 doses  Qty: 1 Bottle, Refills: 0      aspirin delayed-release 81 mg tablet Take 1 Tab by mouth daily. Qty: 30 Tab, Refills: 0      montelukast (SINGULAIR) 10 mg tablet Take 1 Tab by mouth nightly. Indications: controller medication for asthma  Qty: 30 Tab, Refills: 1      roflumilast (DALIRESP) 500 mcg tab tablet Take 1 Tab by mouth daily. Qty: 30 Tab, Refills: 3      OXYGEN-AIR DELIVERY SYSTEMS 3 L by IntraNASal route as needed for Other (sob). naloxone (NARCAN) 4 mg/actuation nasal spray Use 1 spray intranasally, then discard. Repeat with new spray every 2 min as needed for opioid overdose symptoms, alternating nostrils. Qty: 1 Each, Refills: 0         STOP taking these medications       doxycycline (VIBRA-TABS) 100 mg tablet Comments:   Reason for Stopping:                 aYdi Aguilar DO    Dragon medical dictation software was used for portions of this report. Unintended transcription errors may occur. My signature above authenticates this document and my orders, the final    diagnosis (es), discharge prescription (s), and instructions in the Epic    record.

## 2021-02-26 NOTE — CONSULTS
Infectious Disease Consultation Note        Reason: Lower extremity cellulitis, suspected COVID-19 pneumonia    Current abx Prior abx   Ceftriaxone, vancomycin since 2/26/2021      Lines:       Assessment :    50-year-old -American female with h/o CHF, COPD, depression admitted to [facility] on 2/26/21 with increasing shortness of breath. Clinical presentation consistent with COPD exacerbation, left lower extremity cellulitis    Negative rapid COVID-19 test.  No definitive clinical/radiographic evidence to suggest COVID-19 pneumonia at this time  Some of the chest x-ray findings could represent changes from recent viral pneumonia in January 2021    Recommendations:    1. Discontinue ceftriaxone. Continue vancomycin  2. Management of COPD exacerbation per primary team  3. Monitor clinically to determine duration of antibiotic therapy    Thank you for consultation request. Above plan was discussed in details with patient, primary team. Please call me if any further questions or concerns. Will continue to participate in the care of this patient. HPI:    50-year-old -American female with h/o CHF, COPD, depression admitted to [facility] on 2/26/21 with increasing shortness of breath. she came to ed on 2/25. Patient signed out AMA, went home and today she called EMS saying that she has a chest pain when EMS went to her home she says she does not have a chest pain but she has a severe bilateral lower leg pain and shortness of breath blood pressure at that time was well above 200. Patient was given nebulizer treatment IV steroids were given with some improvement nebulizer treatment and bronchodilators were continued in ED. Her wheezing improved with bronchodilators and steroids. COVID-19 negative with rapid test. I have been consulted for further recommendations. Patient c/o bilateral leg pain for 4 days- left greater than right.   She states that she uses oxygen at home and has not experienced any worsening shortness of breath or cough recently. She denies any trauma to her leg. She states that her leg pain is better today compared to yesterday.         Past Medical History:   Diagnosis Date    CHF [facility]congestive heart failure) ([facility]     Chronic respiratory failure with hypoxia ([address] 9/7/2020    CKD (chronic kidney disease) stage 3, GFR 30-59 ml/min 10/31/2019    Cocaine abuse ([address] 11/26/2017    COPD (chronic obstructive pulmonary disease) with chronic bronchitis ([address] 12/19/2017    Dependence on supplemental oxygen     Depression 3/22/2020    Drug-seeking behavior 11/19/2016    Endocrine disease     thyroid issues    Essential hypertension 3/10/2017    Fe deficiency anemia 10/27/2012    Fibromyalgia 12/16/2016    Gastroesophageal reflux disease without esophagitis 10/10/2016    Gastrointestinal disorder     \"blockage in my stomach\"    Hypercholesterolemia     Hypertension     Hypertensive heart failure ([address] 12/19/2017    Morbid obesity due to excess calories ([address] 3/10/2017    NSTEMI (non-ST elevated myocardial infarction) ([address] 3/22/2020    On home O2 12/3/2015    Overview:  2L at home per pt for approx 8 years    Polysubstance abuse ([address] 9/15/2018    Respiratory failure ([address] 1/11/2017    S/P hernia repair 10/31/2019    Sleep apnea 12/19/2017    T wave inversion in EKG 3/9/2019    Tobacco use 8/97/4288    Uncomplicated severe persistent asthma 12/13/2016    Vocal cord disease 12/7/2016       Past Surgical History:   Procedure Laterality Date    HX GI      Exploratory laparotomy with lysis of adhesions and primary repair of incarcerated umbilical hernia       home Medication List    Details   fluticasone furoate-vilanteroL (Breo Ellipta) 200-25 mcg/dose inhaler Take 1 Puff by inhalation daily. Indications: controller medication for asthma  Qty: 1 Inhaler, Refills: 3      citalopram (CeleXA) 20 mg tablet Take 1 Tab by mouth every morning.   Qty: 30 Tab, Refills: 0      atorvastatin (LIPITOR) 20 mg tablet Take 1 Tab by mouth nightly. Qty: 30 Tab, Refills: 0      clindamycin (CLEOCIN) 300 mg capsule Take 1 Cap by mouth four (4) times daily for 10 days. Qty: 40 Cap, Refills: 0      furosemide (Lasix) 40 mg tablet Take 1 Tab by mouth daily for 10 days. Qty: 10 Tab, Refills: 0      albuterol (PROVENTIL VENTOLIN) 2.5 mg /3 mL (0.083 %) nebu 3 mL by Nebulization route every four (4) hours as needed for Wheezing. Qty: 30 Each, Refills: 0      Nebulizer & Compressor machine 1 Each by Does Not Apply route as needed for Wheezing or Shortness of Breath. Qty: 1 Each, Refills: 0      ALPRAZolam (Xanax) 1 mg tablet Take 1 Tab by mouth every eight (8) hours as needed for Anxiety. Max Daily Amount: 3 mg. Qty: 8 Tab, Refills: 0    Associated Diagnoses: Anxiety state      predniSONE (DELTASONE) 20 mg tablet Take 60 mg by mouth daily (with breakfast). Qty: 5 Tab, Refills: 0      nitroglycerin (NITROSTAT) 0.4 mg SL tablet Take 1 Tab by mouth every five (5) minutes as needed for Chest Pain. Sit/Lay down then put one tab under the tongue every 5 minutes as needed for chest pain for 3 doses  Qty: 1 Bottle, Refills: 0      aspirin delayed-release 81 mg tablet Take 1 Tab by mouth daily. Qty: 30 Tab, Refills: 0      montelukast (SINGULAIR) 10 mg tablet Take 1 Tab by mouth nightly. Indications: controller medication for asthma  Qty: 30 Tab, Refills: 1      roflumilast (DALIRESP) 500 mcg tab tablet Take 1 Tab by mouth daily. Qty: 30 Tab, Refills: 3      OXYGEN-AIR DELIVERY SYSTEMS 3 L by IntraNASal route as needed for Other (sob). naloxone (NARCAN) 4 mg/actuation nasal spray Use 1 spray intranasally, then discard. Repeat with new spray every 2 min as needed for opioid overdose symptoms, alternating nostrils.   Qty: 1 Each, Refills: 0          Current Facility-Administered Medications   Medication Dose Route Frequency    potassium chloride (K-DUR, KLOR-CON) SR tablet 40 mEq  40 mEq Oral NOW    ALPRAZolam Bess Gather) tablet 1 mg  1 mg Oral Q8H PRN    aspirin delayed-release tablet 81 mg  81 mg Oral DAILY    atorvastatin (LIPITOR) tablet 20 mg  20 mg Oral QHS    citalopram (CELEXA) tablet 20 mg  20 mg Oral 7am    arformoterol 15 mcg/budesonide 0.5 mg neb solution   Nebulization BID RT    furosemide (LASIX) tablet 40 mg  40 mg Oral DAILY    montelukast (SINGULAIR) tablet 10 mg  10 mg Oral QHS    roflumilast (DALIRESP) tablet 500 mcg  500 mcg Oral DAILY    methylPREDNISolone (PF) (SOLU-MEDROL) injection 40 mg  40 mg IntraVENous Q8H    lactated Ringers infusion  100 mL/hr IntraVENous CONTINUOUS    sodium chloride (NS) flush 5-40 mL  5-40 mL IntraVENous Q8H    sodium chloride (NS) flush 5-40 mL  5-40 mL IntraVENous PRN    acetaminophen (TYLENOL) tablet 650 mg  650 mg Oral Q6H PRN    Or    acetaminophen (TYLENOL) suppository 650 mg  650 mg Rectal Q6H PRN    polyethylene glycol (MIRALAX) packet 17 g  17 g Oral DAILY PRN    promethazine (PHENERGAN) tablet 12.5 mg  12.5 mg Oral Q6H PRN    Or    ondansetron (ZOFRAN) injection 4 mg  4 mg IntraVENous Q6H PRN    enoxaparin (LOVENOX) injection 40 mg  40 mg SubCUTAneous DAILY    oxyCODONE-acetaminophen (PERCOCET 7.5) 7.5-325 mg per tablet 1 Tab  1 Tab Oral Q6H PRN    [START ON 2/27/2021] cefTRIAXone (ROCEPHIN) 1 g in sterile water (preservative free) 10 mL IV syringe  1 g IntraVENous Q24H    . PLEASE ENTER THE PATIENT'S WEIGHT IN CONNECT CARE  1 Each Other ONCE    cloNIDine HCL (CATAPRES) tablet 0.2 mg  0.2 mg Oral Q12H    [START ON 2/27/2021] potassium chloride SR (KLOR-CON 10) tablet 20 mEq  20 mEq Oral DAILY    [START ON 2/27/2021] vancomycin (VANCOCIN) 750 mg in 0.9% sodium chloride 250 mL (VIAL-MATE)  750 mg IntraVENous Q18H    albuterol-ipratropium (DUO-NEB) 2.5 MG-0.5 MG/3 ML  3 mL Nebulization Q6HWA RT       Allergies: Patient has no known allergies. No family history on file.   Social History     Socioeconomic History    Marital status: SINGLE Spouse name: Not on file    Number of children: Not on file    Years of education: Not on file    Highest education level: Not on file   Occupational History    Not on file   Social Needs    Financial resource strain: Not on file    Food insecurity     Worry: Not on file     Inability: Not on file    Transportation needs     Medical: Not on file     Non-medical: Not on file   Tobacco Use    Smoking status: Former Smoker     Packs/day: 0.25    Smokeless tobacco: Current User    Tobacco comment: Pt states she has not had money to buy cigarettes in the past month   Substance and Sexual Activity    Alcohol use: No     Comment: socially    Drug use: Not Currently     Types: Heroin     Comment: pt reports using approximately a month ago    Sexual activity: Not Currently     Comment: last used 9 years ago   Lifestyle    Physical activity     Days per week: Not on file     Minutes per session: Not on file    Stress: Not on file   Relationships    Social connections     Talks on phone: Not on file     Gets together: Not on file     Attends Yazidi service: Not on file     Active member of club or organization: Not on file     Attends meetings of clubs or organizations: Not on file     Relationship status: Not on file    Intimate partner violence     Fear of current or ex partner: Not on file     Emotionally abused: Not on file     Physically abused: Not on file     Forced sexual activity: Not on file   Other Topics Concern    Not on file   Social History Narrative    Not on file     Social History     Tobacco Use   Smoking Status Former Smoker    Packs/day: 0.25   Smokeless Tobacco Current User   Tobacco Comment    Pt states she has not had money to buy cigarettes in the past month        Temp (24hrs), Av.8 °F (36.6 °C), Min:97.1 °F (36.2 °C), Max:98.4 °F (36.9 °C)    Visit Vitals  BP (!) 149/73   Pulse 72   Temp 97.5 °F (36.4 °C)   Resp 20   Wt 67.6 kg (149 lb)   LMP 2009   SpO2 100%   BMI 29.10 kg/m²       ROS: 12 point ROS obtained in details.  Pertinent positives as mentioned in HPI,   otherwise negative    Physical Exam:    General appearance - oriented to person, place, and time, normal appearing weight, in mild to moderate distress and ill-appearing  Mental status - alert, oriented to person, place, and time  Eyes - pupils equal and reactive, extraocular eye movements intact  Ears - bilateral TM's and external ear canals normal  Nose - normal and patent, no erythema, discharge or polyps  Mouth - mucous membranes moist, pharynx normal without lesions  Neck - supple, no significant adenopathy  Chest -bilateral air entry present,no wheezes/ rales  Heart - normal rate, regular rhythm, normal S1, S2, no rubs, clicks or gallops  Abdomen - soft, nontender, nondistended, no masses or organomegaly  Neurological - alert, oriented, normal speech, no focal findings or movement disorder noted  Extremities -bilateral 1+ edema, bilateral circumferential just above ankle area redness swelling and tenderness left side more than right present      Labs: Results:   Chemistry Recent Labs     02/26/21  0037 02/24/21  1037   * 85    142   K 3.0* 3.5    104   CO2 31 31   BUN 21* 21*   CREA 1.30 0.91   CA 8.7 9.5   AGAP 6 7   BUCR 16 23*   AP  --  97   TP  --  7.8   ALB  --  3.9   GLOB  --  3.9   AGRAT  --  1.0      CBC w/Diff Recent Labs     02/26/21  0037 02/24/21  1037   WBC 9.9 12.1   RBC 3.74* 4.33   HGB 10.8* 12.6   HCT 33.4* 38.7    251   GRANS 62 83*   LYMPH 31 10*   EOS 1 1      Microbiology Recent Labs     02/24/21  1037   CULT NO GROWTH 2 DAYS          RADIOLOGY:    All available imaging studies/reports in [facility] for this admission were reviewed    Dr. Jenna Larose, Infectious Disease Specialist  [phone]  February 26, 2021  3:47 PM

## 2021-02-26 NOTE — ED TRIAGE NOTES
Pt called EMS for CP but when they got to her, she denied CP but her BP was 250/150 and pt was wheezing in all lung fields. EMS started a 20g IV Right forearm, gave 125mg Solu-medrol, 1 albuterol tx, 1 duo-neb tx, 2G mag and 100mg IVF. Pt presents to the ER with bilateral leg pain. Legs are extremely red and swollen, hot to the touch. Was seen here earlier this week and AMA'd after getting mad with staff. Pt states that she is not really short of breath and just her legs hurt. Pt is demanding foot and drink at this time.
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
## 2021-02-26 NOTE — PROGRESS NOTES
Hospitalist Progress Note    Patient: Leanna Gordon Age: 63 y.o. : 1965 MR#: [id number] SSN: [id number]  Date/Time: 2021 5:11 PM    DOA: 2021  PCP: Dawn Charles MD    Subjective:     She was admitted this AM by Dr Jose Sykes for cellulitis and COPD exacerbation. She remains stable. Afebrile. Has vancomycin and ceftriaxone. Has steroid and bronchodilator   She asked for more narcotic for pain control       Interval Hospital Course:        ROS: No current fever/chills, no headache, no dizziness, no facial pain, no sinus congestion,   No swallowing pain, No chest pain, no palpitation, ++ shortness of breath, no abd pain,  No diarrhea, no urinary complaint, + leg pain or swelling      Assessment/Plan:     1. Bilateral leg edema, negative DVT on duplex, ? CHF symptom? 2. Left leg cellulitis is more evidence  3. Acute COPD/Asthma exacerbation, negative covid   4. Chronic respiratory failure with hypoxia   5. H/o tobacco dependence   6. Hypertensive urgency, resolved   7. History of diastolic CHF, compensated, proBNP low   8. Dyslipidemia   9. Mil renal insufficiency only   10. Hypokalemia   11. Iron deficiency anemia   12. H/o prolonged heroin abuse     Continue IV vancomycin, follow up culture. Pain control and add morphine prn   Elevated leg. ID consult appreciated.    Continue steroid and bronchodilator prn  Cont daliresp, singulair, arformoterol/budesonide,   Add back her antihypertensive med, hold lasix   Cont asa, lipitor  ISS   pepcid    Full code   Additional Notes:   Time spent >35 minutes     Case discussed with:  [x]Patient  []Family  [x]Nursing  [x]Case Management  DVT Prophylaxis:  [x]Lovenox  []Hep SQ  []SCDs  []Coumadin   []On Heparin gtt    Signed By: Avinash Joseph MD     2021 5:11 PM              Objective:   VS:   Visit Vitals  BP (!) 149/73   Pulse 72   Temp 97.5 °F (36.4 °C)   Resp 20   Wt 67.6 kg (149 lb)   LMP 2009   SpO2 100%   BMI 29.10 kg/m²      Tmax/24hrs: Temp (24hrs), Av.8 °F (36.6 °C), Min:97.1 °F (36.2 °C), Max:98.4 °F (36.9 °C)      Intake/Output Summary (Last 24 hours) at 2021 1711  Last data filed at 2021 1303  Gross per 24 hour   Intake 120 ml   Output 250 ml   Net -130 ml       Tele:   General:  Cooperative, Not in acute distress, speaks in full sentence while in bed  HEENT: PERRL, EOMI, supple neck, no JVD, dry oral mucosa  Cardiovascular: S1S2 regular, no rub/gallop   Pulmonary: Clear air entry bilaterally, no wheezing, no crackle  GI:  Soft, non tender, non distended, +bs, no guarding   Extremities:  + pedal edema, +distal pulses appreciated   Red/warm/tender of left leg  Neuro: AOx3, moving all extremities, no gross deficit.      Additional:       Current Facility-Administered Medications   Medication Dose Route Frequency    ALPRAZolam (XANAX) tablet 1 mg  1 mg Oral Q8H PRN    aspirin delayed-release tablet 81 mg  81 mg Oral DAILY    atorvastatin (LIPITOR) tablet 20 mg  20 mg Oral QHS    citalopram (CELEXA) tablet 20 mg  20 mg Oral 7am    arformoterol 15 mcg/budesonide 0.5 mg neb solution   Nebulization BID RT    furosemide (LASIX) tablet 40 mg  40 mg Oral DAILY    montelukast (SINGULAIR) tablet 10 mg  10 mg Oral QHS    roflumilast (DALIRESP) tablet 500 mcg  500 mcg Oral DAILY    methylPREDNISolone (PF) (SOLU-MEDROL) injection 40 mg  40 mg IntraVENous Q8H    lactated Ringers infusion  100 mL/hr IntraVENous CONTINUOUS    sodium chloride (NS) flush 5-40 mL  5-40 mL IntraVENous Q8H    sodium chloride (NS) flush 5-40 mL  5-40 mL IntraVENous PRN    acetaminophen (TYLENOL) tablet 650 mg  650 mg Oral Q6H PRN    Or    acetaminophen (TYLENOL) suppository 650 mg  650 mg Rectal Q6H PRN    polyethylene glycol (MIRALAX) packet 17 g  17 g Oral DAILY PRN    promethazine (PHENERGAN) tablet 12.5 mg  12.5 mg Oral Q6H PRN    Or    ondansetron (ZOFRAN) injection 4 mg  4 mg IntraVENous Q6H PRN    enoxaparin (LOVENOX) injection 40 mg  40 mg SubCUTAneous DAILY    oxyCODONE-acetaminophen (PERCOCET 7.5) 7.5-325 mg per tablet 1 Tab  1 Tab Oral Q6H PRN    . PLEASE ENTER THE PATIENT'S WEIGHT IN CONNECT CARE  1 Each Other ONCE    cloNIDine HCL (CATAPRES) tablet 0.2 mg  0.2 mg Oral Q12H    [START ON 2/27/2021] potassium chloride SR (KLOR-CON 10) tablet 20 mEq  20 mEq Oral DAILY    [START ON 2/27/2021] vancomycin (VANCOCIN) 750 mg in 0.9% sodium chloride 250 mL (VIAL-MATE)  750 mg IntraVENous Q18H    albuterol-ipratropium (DUO-NEB) 2.5 MG-0.5 MG/3 ML  3 mL Nebulization Q6HWA RT    gabapentin (NEURONTIN) capsule 300 mg  300 mg Oral TID    morphine injection 2 mg  2 mg IntraVENous Q4H PRN            Lab/Data Review:  Labs: Results:       Chemistry Recent Labs     02/26/21 0037 02/24/21  1037   * 85    142   K 3.0* 3.5    104   CO2 31 31   BUN 21* 21*   CREA 1.30 0.91   BUCR 16 23*   AGAP 6 7   CA 8.7 9.5     Recent Labs     02/24/21  1037   ALT 19   TP 7.8   ALB 3.9   GLOB 3.9   AGRAT 1.0      CBC w/Diff Recent Labs     02/26/21 0037 02/24/21  1037   WBC 9.9 12.1   RBC 3.74* 4.33   HGB 10.8* 12.6   HCT 33.4* 38.7   MCV 89.3 89.4   MCH 28.9 29.1   MCHC 32.3 32.6   RDW 14.7* 14.2    251   GRANS 62 83*   LYMPH 31 10*   EOS 1 1      Coagulation No results for input(s): PTP, INR, APTT, INREXT in the last 72 hours.     Iron/Ferritin Lab Results   Component Value Date/Time    Ferritin 41 09/09/2020 05:09 AM       BNP    Cardiac Enzymes Lab Results   Component Value Date/Time    CK 56 02/24/2021 10:37 AM    CK - MB 2.2 02/24/2021 10:37 AM    CK-MB Index 3.9 02/24/2021 10:37 AM    Troponin-I <0.015 12/07/2015 05:23 PM    Troponin-I, QT <0.02 02/24/2021 10:37 AM    BNP 19.9 12/07/2015 05:23 PM        Lactic Acid    Thyroid Studies          All Micro Results     Procedure Component Value Units Date/Time    COVID-19 RAPID TEST [[id number]] Collected: 02/26/21 9882    Order Status: Completed Specimen: Nasopharyngeal Updated: 02/26/21 0436     Specimen source Nasopharyngeal        COVID-19 rapid test Not detected        Comment: Rapid Abbott ID Now       Rapid NAAT:  The specimen is NEGATIVE for SARS-CoV-2, the novel coronavirus associated with COVID-19. Negative results should be treated as presumptive and, if inconsistent with clinical signs and symptoms or necessary for patient management, should be tested with an alternative molecular assay. Negative results do not preclude SARS-CoV-2 infection and should not be used as the sole basis for patient management decisions. This test has been authorized by the FDA under an Emergency Use Authorization (EUA) for use by authorized laboratories. Fact sheet for Healthcare Providers: ConventionUpdate.co.nz  Fact sheet for Patients: ConventionUpdate.co.nz       Methodology: Isothermal Nucleic Acid Amplification                 Images:    CT (Most Recent). XRAY (Most Recent)      EKG No results found for this or any previous visit.      2D ECHO

## 2021-02-26 NOTE — ROUTINE PROCESS
Bedside and Verbal shift change report given to Opal Webster RN (oncoming nurse) by Brody Hays RN (offgoing nurse). Report included the following information SBAR, Kardex, MAR and Recent Results.

## 2021-02-26 NOTE — PROGRESS NOTES
Physician Progress Note      PATIENTF[facility] Areas  [facility] #:                  [id number]  :                       1965  ADMIT DATE:       2021 12:14 AM  DISCH DATE:  RESPONDING  PROVIDER #:        MAYUR Mayer MD          QUERY TEXT:    PMH CHF documented on H&P. If possible, please document in progress notes and discharge summary further specificity regarding the type of CHF:    The medical record reflects the following:  Risk Factors: PMH of \"CHF\"  Clinical Indicators: Last echo 10/19/20- LV: Estimated LVEF is 60 - 65%. Visually measured ejection fraction. Normal cavity size and systolic function (ejection fraction normal). Moderate concentric hypertrophy. Wall motion: normal  Treatment: On Lasix at home    Thank you,  [address] [facility], [facility]  [phone]  Options provided:  -- Chronic Systolic CHF/HFrEF  -- Chronic Diastolic CHF/HFpEF  -- Chronic Systolic and Diastolic CHF  -- Other - I will add my own diagnosis  -- Disagree - Not applicable / Not valid  -- Disagree - Clinically unable to determine / Unknown  -- Refer to Clinical Documentation Reviewer    PROVIDER RESPONSE TEXT:    This patient has chronic diastolic CHF/HFpEF. Query created by:  Olinda Eric on 2021 3:01 PM      Electronically signed by:  [facility] MD Stella Mayer MD 2021 5:09 PM

## 2021-02-26 NOTE — PROGRESS NOTES
Reason for Admission:   Bilateral lower leg cellulitis [L03.116, T10.895]  COPD with acute exacerbation ([facility]) [J44.1]  Hypokalemia [E87.6]  COPD exacerbation ([facility] [J44.1]               RUR Score:     96%             Resources/supports as identified by patient/family:   Patient lives alone, has daughter that lives nearby and is receiving personal care    Top Challenges facing patient (as identified by patient/family and CM): Finances/Medication cost?     no  Transportation      Medicaid transport or daughter  Support system or lack thereof? Daughter, personal care  Living arrangements? Home alone, daughter lives nearby, personal care   Self-care/ADLs/Cognition?   receives personal care        Current Advanced Directive/Advance Care Plan:   no                          Plan for utilizing home health:    yes                      Likelihood of readmission:   HIGH    Transition of Care Plan:                    Initial assessment completed with patient. Cognitive status of patient: oriented to time, place, person and situation. Face sheet information confirmed:  yes. The patient to participate in her discharge plan and to receive any needed information. This patient lives in a single family home alone. Patient is able to navigate steps as needed. Prior to hospitalization, patient was considered to be independent with ADLs/IADLS : no . If not independent,  patient needs assist with : bathing, food preparation and cooking    Patient has a current ACP document on file: no  The patient will need medicaid transport home if daughter is unable to pick her up. The patient already has oxygen concentrator medical equipment available in the home. Patient is not currently active with home health. Patient does receive personal care, she does not know which agency though. Patient has not stayed in a skilled nursing facility or rehab.      This patient is on dialysis :no    List of available Home Health agencies were provided and reviewed with the patient prior to discharge. Freedom of choice signed: yes, for [address]. Currently, the discharge plan is Home with [address]. The patient states that she can obtain her medications from the pharmacy, and take her medications as directed. Patient's current insurance is [employer] Medicaid      Care Management Interventions  PCP Verified by CM: Yes  Mode of Transport at Discharge: Other (see comment)(medicaid transport )  Transition of Care Consult (CM Consult): Discharge Planning, Home Health  Discharge Durable Medical Equipment: No  Physical Therapy Consult: No  Occupational Therapy Consult: No  Speech Therapy Consult: No  Current Support Network: Lives Alone(has personal care aide)  Confirm Follow Up Transport: Other (see comment)(medicaid transport)  The Plan for Transition of Care is Related to the Following Treatment Goals : home health  The Patient and/or Patient Representative was Provided with a Choice of Provider and Agrees with the Discharge Plan?: Yes  Name of the Patient Representative Who was Provided with a Choice of Provider and Agrees with the Discharge Plan: Lexis Dutta  Freedom of Choice List was Provided with Basic Dialogue that Supports the Patient's Individualized Plan of Care/Goals, Treatment Preferences and Shares the Quality Data Associated with the Providers?: Yes  Discharge Location  Discharge Placement: Home with home health    Attempted to call patient's daughter to see if she knew personal care agency but, no answer and mobile number listed [phone] is the wrong number.        Skip Becerril RN, BSN   Care Management  [phone]

## 2021-02-26 NOTE — PROGRESS NOTES
ED tech. Brought pt. To room 460. Pt. Saw another nurse passing by. Asked her name and then called nurse a racial slur. When asked why she wanted to know the nurse's name, pt. Became upset and stated that she did not want me to be her nurse.

## 2021-02-26 NOTE — PROGRESS NOTES
Pt called to the nursing station stated tht current pain medication regimen not effective.   Per provider order placed for gabapentin 300 mg t.i.d

## 2021-02-26 NOTE — H&P
History and Physical    Patient: Claudio Corbett MRN: [id number]  SSN: [id number]    YOB: 1965  Age: 63 y.o. Sex: female      Amy Oglesby MD    C/C : Severe bilateral lower leg pain/shortness of breath    Subjective:      Claudio Corbett is a 63 y.o. female with past medical history of COPD/noncompliance/hypertension being admitted for increasing shortness of breath and bilateral lower leg pain. Patient and ED physician discussion of source of HPI    79-year-old -American female well-known to our service frequent admissions came to emergency room yesterday with increasing shortness of breath. Patient signed out AMA, went home and today she called EMS saying that she has a chest pain when EMS went to her home she says she does not have a chest pain but she has a severe bilateral lower leg pain and shortness of breath blood pressure at that time was well above 200. Patient was given nebulizer treatment IV steroids were given with some improvement nebulizer treatment and bronchodilators were continued in ED,  During my examination patient was, alert awake oriented still complaining of bilateral lower leg pains and redness. Patient denies any chills rigors, no history of nausea vomiting diarrhea, off-and-on chest pain but denies any chest pain currently, her shortness of breath has improved remarkably.      In ED  -Her wheezing has improved with bronchodilators and steroids  -COVID-19 negative with rapid test  -Hypokalemia is diagnosed in p.o. replacement ordered    Full code    Past Medical History:   Diagnosis Date    CHF (congestive heart failure) (HCC)     Chronic respiratory failure with hypoxia ([address] 9/7/2020    CKD (chronic kidney disease) stage 3, GFR 30-59 ml/min 10/31/2019    Cocaine abuse ([address] 11/26/2017    COPD (chronic obstructive pulmonary disease) with chronic bronchitis ([address] 12/19/2017    Dependence on supplemental oxygen     Depression 3/22/2020  Drug-seeking behavior 11/19/2016    Endocrine disease     thyroid issues    Essential hypertension 3/10/2017    Fe deficiency anemia 10/27/2012    Fibromyalgia 12/16/2016    Gastroesophageal reflux disease without esophagitis 10/10/2016    Gastrointestinal disorder     \"blockage in my stomach\"    Hypercholesterolemia     Hypertension     Hypertensive heart failure ([address] 12/19/2017    Morbid obesity due to excess calories ([address] 3/10/2017    NSTEMI (non-ST elevated myocardial infarction) ([address] 3/22/2020    On home O2 12/3/2015    Overview:  2L at home per pt for approx 8 years    Polysubstance abuse ([address] 9/15/2018    Respiratory failure ([address] 1/11/2017    S/P hernia repair 10/31/2019    Sleep apnea 12/19/2017    T wave inversion in EKG 3/9/2019    Tobacco use 0/13/0938    Uncomplicated severe persistent asthma 12/13/2016    Vocal cord disease 12/7/2016     Past Surgical History:   Procedure Laterality Date    HX GI      Exploratory laparotomy with lysis of adhesions and primary repair of incarcerated umbilical hernia      No family history on file. Social History     Tobacco Use    Smoking status: Former Smoker     Packs/day: 0.25    Smokeless tobacco: Current User    Tobacco comment: Pt states she has not had money to buy cigarettes in the past month   Substance Use Topics    Alcohol use: No     Comment: socially      Prior to Admission medications    Medication Sig Start Date End Date Taking? Authorizing Provider   clindamycin (CLEOCIN) 300 mg capsule Take 1 Cap by mouth four (4) times daily for 10 days. 2/24/21 3/6/21  Jerson Celestin PA-C   furosemide (Lasix) 40 mg tablet Take 1 Tab by mouth daily for 10 days. 2/17/21 2/27/21  Austin Dhaliwal MD   albuterol (PROVENTIL VENTOLIN) 2.5 mg /3 mL (0.083 %) nebu 3 mL by Nebulization route every four (4) hours as needed for Wheezing.  1/29/21   Kenya Dixon MD   Nebulizer & Compressor machine 1 Each by Does Not Apply route as needed for Wheezing or Shortness of Breath. 1/29/21   Shilpa Keith MD   ALPRAZolam (Xanax) 1 mg tablet Take 1 Tab by mouth every eight (8) hours as needed for Anxiety. Max Daily Amount: 3 mg. 1/26/21   Jagdeep Pierson MD   predniSONE (DELTASONE) 20 mg tablet Take 60 mg by mouth daily (with breakfast). 1/19/21   Joyce Fisher MD   fluticasone furoate-vilanteroL (Breo Ellipta) 200-25 mcg/dose inhaler Take 1 Puff by inhalation daily. Indications: controller medication for asthma 1/18/21   Joyce Fisher MD   nitroglycerin (NITROSTAT) 0.4 mg SL tablet Take 1 Tab by mouth every five (5) minutes as needed for Chest Pain. Sit/Lay down then put one tab under the tongue every 5 minutes as needed for chest pain for 3 doses 12/31/20   Dorota Jackson MD   aspirin delayed-release 81 mg tablet Take 1 Tab by mouth daily. 12/30/20   Dorota Jackson MD   montelukast (SINGULAIR) 10 mg tablet Take 1 Tab by mouth nightly. Indications: controller medication for asthma 12/25/20   Maria M Hartmann MD   roflumilast (DALIRESP) 500 mcg tab tablet Take 1 Tab by mouth daily. 12/25/20   Maria M Hartmann MD   citalopram (CeleXA) 20 mg tablet Take 1 Tab by mouth every morning. 11/30/20   Maria M Hartmann MD   OXYGEN-AIR DELIVERY SYSTEMS 3 L by IntraNASal route as needed for Other (sob). Provider, Historical   atorvastatin (LIPITOR) 20 mg tablet Take 1 Tab by mouth nightly. 3/14/19   Jena Harris MD   naloxone [facility]) 4 mg/actuation nasal spray Use 1 spray intranasally, then discard. Repeat with new spray every 2 min as needed for opioid overdose symptoms, alternating nostrils. 3/14/19   Jena Harris MD        No Known Allergies        Review of Systems:  positive responses in bold type   Constitutional: Negative for fever, chills, diaphoresis and unexpected weight change. HENT: Negative for ear pain, congestion, sore throat, rhinorrhea, drooling, trouble swallowing, neck pain and tinnitus.    Eyes: Negative for photophobia, pain, redness and visual disturbance. Respiratory: negative for shortness of breath, cough, choking, chest tightness, wheezing or stridor. Cardiovascular: Negative for chest pain, palpitations and leg swelling. Gastrointestinal: Negative for nausea, vomiting, abdominal pain, diarrhea, constipation, blood in stool, abdominal distention and anal bleeding. Genitourinary: Negative for dysuria, urgency, frequency, hematuria, flank pain and difficulty urinating. Musculoskeletal: Negative for back pain and arthralgias. Skin: Negative for color change, rash and wound. Neurological: Negative for dizziness, seizures, syncope, speech difficulty, light-headedness or headaches. Hematological: Does not bruise/bleed easily. Psychiatric/Behavioral: Negative for suicidal ideas, hallucinations, behavioral problems, self-injury or agitation         Objective: There is no height or weight on file to calculate BMI.   Vitals:    02/26/21 0400 02/26/21 0415 02/26/21 0430 02/26/21 0548   BP:    (!) 152/74   Pulse: 90 87 87 72   Resp: 26 26 20 18   Temp:    97.1 °F (36.2 °C)   SpO2: 100% 99% 100% 98%        Physical Exam:  General appearance - oriented to person, place, and time, normal appearing weight, in mild to moderate distress and ill-appearing  Mental status - alert, oriented to person, place, and time  Eyes - pupils equal and reactive, extraocular eye movements intact  Ears - bilateral TM's and external ear canals normal  Nose - normal and patent, no erythema, discharge or polyps  Mouth - mucous membranes moist, pharynx normal without lesions  Neck - supple, no significant adenopathy  Chest -bilateral air entry present, bilateral inspiratory and expiratory wheezing are present poor inspiratory effort  Heart - normal rate, regular rhythm, normal S1, S2, no murmurs, rubs, clicks or gallops  Abdomen - soft, nontender, nondistended, no masses or organomegaly  Neurological - alert, oriented, normal speech, no focal findings or movement disorder noted  Extremities -bilateral 1+ edema, bilateral circumferential just above ankle area redness swelling and tenderness left side more than right present         Hospital Problems  Date Reviewed: 10/23/2019          Codes Class Noted POA    Hypokalemia ICD-10-CM: E87.6  ICD-9-CM: 276.8  2/26/2021 Unknown        Bilateral lower leg cellulitis ICD-10-CM: L03.116, L03.115  ICD-9-CM: 682.6  2/26/2021 Unknown        COPD with acute exacerbation ([facility] ICD-10-CM: J44.1  ICD-9-CM: 491.21  9/7/2020 Unknown        COPD exacerbation ([facility] ICD-10-CM: J44.1  ICD-9-CM: 491.21  9/7/2020 Unknown              CBC:  Lab Results   Component Value Date/Time    WBC 9.9 02/26/2021 12:37 AM    HGB 10.8 (L) 02/26/2021 12:37 AM    HCT 33.4 (L) 02/26/2021 12:37 AM    PLATELET 657 22/71/3377 12:37 AM    MCV 89.3 02/26/2021 12:37 AM        CMP:  Lab Results   Component Value Date/Time    Sodium 142 02/26/2021 12:37 AM    Potassium 3.0 (L) 02/26/2021 12:37 AM    Chloride 105 02/26/2021 12:37 AM    CO2 31 02/26/2021 12:37 AM    Anion gap 6 02/26/2021 12:37 AM    Glucose 107 (H) 02/26/2021 12:37 AM    BUN 21 (H) 02/26/2021 12:37 AM    Creatinine 1.30 02/26/2021 12:37 AM    BUN/Creatinine ratio 16 02/26/2021 12:37 AM    GFR est AA 51 (L) 02/26/2021 12:37 AM    GFR est non-AA 42 (L) 02/26/2021 12:37 AM    Calcium 8.7 02/26/2021 12:37 AM    Alk. phosphatase 97 02/24/2021 10:37 AM    Protein, total 7.8 02/24/2021 10:37 AM    Albumin 3.9 02/24/2021 10:37 AM    Globulin 3.9 02/24/2021 10:37 AM    A-G Ratio 1.0 02/24/2021 10:37 AM    ALT (SGPT) 19 02/24/2021 10:37 AM        PT/INR  Lab Results   Component Value Date/Time    INR 1.0 01/25/2021 08:30 PM    INR 1.0 12/30/2020 09:19 AM    INR 1.1 10/10/2020 08:40 PM    Prothrombin time 13.1 01/25/2021 08:30 PM    Prothrombin time 13.3 12/30/2020 09:19 AM    Prothrombin time 13.9 10/10/2020 08:40 PM            EKG: No results found for this or any previous visit.        Assessment And Plan:     1 COPD exacerbation  2 bilateral lower leg cellulitis-lower leg blister formation (etiology not clear for this blisters)   3 hypertension-hypertensive urgency  4 noncompliance-compliance to medical management emphasized to patient till her understanding  5 hypokalemia  6 anemia-iron deficiency-follow iron levels  7 dehydration-IV Ringer's lactate  8 mild renal insufficiency -follow renal functions  9 COVID-19 negative    Plan    -COPD exacerbation-standard treatment with bronchodilators oxygen and steroids    -Bilateral lower leg cellulitis-probably source of infection blister infected now it is dry, rule out any thromboembolic episode DVT study ordered, IV vancomycin pharmacy to dose, follow cultures, ID consultation    -Hypertensive urgency-improved while she is in the hospital it could be precipitated by her pain and anxiety continue to monitor with current medications    -Renal insufficiency-IV hydration with Ringer lactate          Signed By: Loco Gupta MD     February 26, 2021

## 2021-02-27 LAB
ANION GAP SERPL CALC-SCNC: 4 MMOL/L (ref 3–18)
BASOPHILS # BLD: 0 K/UL (ref 0–0.1)
BASOPHILS NFR BLD: 0 % (ref 0–2)
BUN SERPL-MCNC: 21 MG/DL (ref 7–18)
BUN/CREAT SERPL: 23 (ref 12–20)
CALCIUM SERPL-MCNC: 8.7 MG/DL (ref 8.5–10.1)
CHLORIDE SERPL-SCNC: 110 MMOL/L (ref 100–111)
CO2 SERPL-SCNC: 30 MMOL/L (ref 21–32)
CREAT SERPL-MCNC: 0.93 MG/DL (ref 0.6–1.3)
DIFFERENTIAL METHOD BLD: ABNORMAL
EOSINOPHIL # BLD: 0 K/UL (ref 0–0.4)
EOSINOPHIL NFR BLD: 0 % (ref 0–5)
ERYTHROCYTE [DISTWIDTH] IN BLOOD BY AUTOMATED COUNT: 14.4 % (ref 11.6–14.5)
GLUCOSE SERPL-MCNC: 115 MG/DL (ref 74–99)
HCT VFR BLD AUTO: 34 % (ref 35–45)
HGB BLD-MCNC: 10.7 G/DL (ref 12–16)
LYMPHOCYTES # BLD: 1.2 K/UL (ref 0.9–3.6)
LYMPHOCYTES NFR BLD: 12 % (ref 21–52)
MCH RBC QN AUTO: 28 PG (ref 24–34)
MCHC RBC AUTO-ENTMCNC: 31.5 G/DL (ref 31–37)
MCV RBC AUTO: 89 FL (ref 74–97)
MONOCYTES # BLD: 0.3 K/UL (ref 0.05–1.2)
MONOCYTES NFR BLD: 3 % (ref 3–10)
NEUTS SEG # BLD: 8.8 K/UL (ref 1.8–8)
NEUTS SEG NFR BLD: 85 % (ref 40–73)
PLATELET # BLD AUTO: 251 K/UL (ref 135–420)
PMV BLD AUTO: 11 FL (ref 9.2–11.8)
POTASSIUM SERPL-SCNC: 3.8 MMOL/L (ref 3.5–5.5)
PROCALCITONIN SERPL-MCNC: <0.05 NG/ML
RBC # BLD AUTO: 3.82 M/UL (ref 4.2–5.3)
SODIUM SERPL-SCNC: 144 MMOL/L (ref 136–145)
WBC # BLD AUTO: 10.2 K/UL (ref 4.6–13.2)

## 2021-02-27 PROCEDURE — 84145 PROCALCITONIN (PCT): CPT

## 2021-02-27 PROCEDURE — 74011250636 HC RX REV CODE- 250/636: Performed by: INTERNAL MEDICINE

## 2021-02-27 PROCEDURE — 74011250637 HC RX REV CODE- 250/637: Performed by: INTERNAL MEDICINE

## 2021-02-27 PROCEDURE — 74011000250 HC RX REV CODE- 250

## 2021-02-27 PROCEDURE — 99232 SBSQ HOSP IP/OBS MODERATE 35: CPT | Performed by: INTERNAL MEDICINE

## 2021-02-27 PROCEDURE — 94761 N-INVAS EAR/PLS OXIMETRY MLT: CPT

## 2021-02-27 PROCEDURE — 36415 COLL VENOUS BLD VENIPUNCTURE: CPT

## 2021-02-27 PROCEDURE — 74011000250 HC RX REV CODE- 250: Performed by: INTERNAL MEDICINE

## 2021-02-27 PROCEDURE — 85025 COMPLETE CBC W/AUTO DIFF WBC: CPT

## 2021-02-27 PROCEDURE — 80048 BASIC METABOLIC PNL TOTAL CA: CPT

## 2021-02-27 PROCEDURE — 65660000000 HC RM CCU STEPDOWN

## 2021-02-27 PROCEDURE — 97165 OT EVAL LOW COMPLEX 30 MIN: CPT

## 2021-02-27 PROCEDURE — 94640 AIRWAY INHALATION TREATMENT: CPT

## 2021-02-27 RX ORDER — GABAPENTIN 400 MG/1
400 CAPSULE ORAL 3 TIMES DAILY
Status: DISCONTINUED | OUTPATIENT
Start: 2021-02-27 | End: 2021-03-01 | Stop reason: HOSPADM

## 2021-02-27 RX ORDER — ALBUTEROL SULFATE 0.83 MG/ML
SOLUTION RESPIRATORY (INHALATION)
Status: COMPLETED
Start: 2021-02-27 | End: 2021-02-27

## 2021-02-27 RX ORDER — FAMOTIDINE 20 MG/1
20 TABLET, FILM COATED ORAL EVERY 12 HOURS
Status: DISCONTINUED | OUTPATIENT
Start: 2021-02-27 | End: 2021-03-01 | Stop reason: HOSPADM

## 2021-02-27 RX ORDER — LOSARTAN POTASSIUM 25 MG/1
25 TABLET ORAL DAILY
Status: DISCONTINUED | OUTPATIENT
Start: 2021-02-27 | End: 2021-03-01 | Stop reason: HOSPADM

## 2021-02-27 RX ORDER — LIDOCAINE 4 G/100G
2 PATCH TOPICAL EVERY 24 HOURS
Status: DISCONTINUED | OUTPATIENT
Start: 2021-02-27 | End: 2021-03-01 | Stop reason: HOSPADM

## 2021-02-27 RX ORDER — MORPHINE SULFATE 2 MG/ML
2 INJECTION, SOLUTION INTRAMUSCULAR; INTRAVENOUS ONCE
Status: COMPLETED | OUTPATIENT
Start: 2021-02-27 | End: 2021-02-27

## 2021-02-27 RX ORDER — LANOLIN ALCOHOL/MO/W.PET/CERES
1 CREAM (GRAM) TOPICAL
Status: DISCONTINUED | OUTPATIENT
Start: 2021-02-27 | End: 2021-03-01 | Stop reason: HOSPADM

## 2021-02-27 RX ORDER — ACETAMINOPHEN 500 MG
1000 TABLET ORAL EVERY 8 HOURS
Status: DISCONTINUED | OUTPATIENT
Start: 2021-02-27 | End: 2021-03-01

## 2021-02-27 RX ORDER — MORPHINE SULFATE 4 MG/ML
4 INJECTION, SOLUTION INTRAMUSCULAR; INTRAVENOUS
Status: DISCONTINUED | OUTPATIENT
Start: 2021-02-27 | End: 2021-03-01

## 2021-02-27 RX ADMIN — MORPHINE SULFATE 2 MG: 2 INJECTION, SOLUTION INTRAMUSCULAR; INTRAVENOUS at 15:53

## 2021-02-27 RX ADMIN — IPRATROPIUM BROMIDE AND ALBUTEROL SULFATE 3 ML: .5; 3 SOLUTION RESPIRATORY (INHALATION) at 14:00

## 2021-02-27 RX ADMIN — CLONIDINE HYDROCHLORIDE 0.2 MG: 0.1 TABLET ORAL at 22:17

## 2021-02-27 RX ADMIN — VANCOMYCIN HYDROCHLORIDE 750 MG: 750 INJECTION, POWDER, LYOPHILIZED, FOR SOLUTION INTRAVENOUS at 22:17

## 2021-02-27 RX ADMIN — ARFORMOTEROL TARTRATE: 15 SOLUTION RESPIRATORY (INHALATION) at 21:50

## 2021-02-27 RX ADMIN — MORPHINE SULFATE 2 MG: 2 INJECTION, SOLUTION INTRAMUSCULAR; INTRAVENOUS at 08:45

## 2021-02-27 RX ADMIN — METHYLPREDNISOLONE SODIUM SUCCINATE 40 MG: 40 INJECTION, POWDER, FOR SOLUTION INTRAMUSCULAR; INTRAVENOUS at 06:05

## 2021-02-27 RX ADMIN — OXYCODONE HYDROCHLORIDE AND ACETAMINOPHEN 1 TABLET: 7.5; 325 TABLET ORAL at 09:47

## 2021-02-27 RX ADMIN — ALPRAZOLAM 1 MG: 1 TABLET ORAL at 15:54

## 2021-02-27 RX ADMIN — ROFLUMILAST 500 MCG: 500 TABLET ORAL at 08:45

## 2021-02-27 RX ADMIN — Medication 81 MG: at 08:45

## 2021-02-27 RX ADMIN — MORPHINE SULFATE 2 MG: 2 INJECTION, SOLUTION INTRAMUSCULAR; INTRAVENOUS at 14:43

## 2021-02-27 RX ADMIN — Medication 10 ML: at 22:18

## 2021-02-27 RX ADMIN — METHYLPREDNISOLONE SODIUM SUCCINATE 40 MG: 40 INJECTION, POWDER, FOR SOLUTION INTRAMUSCULAR; INTRAVENOUS at 22:17

## 2021-02-27 RX ADMIN — FAMOTIDINE 20 MG: 20 TABLET ORAL at 08:46

## 2021-02-27 RX ADMIN — ALPRAZOLAM 1 MG: 1 TABLET ORAL at 04:31

## 2021-02-27 RX ADMIN — LOSARTAN POTASSIUM 25 MG: 25 TABLET, FILM COATED ORAL at 09:47

## 2021-02-27 RX ADMIN — CLONIDINE HYDROCHLORIDE 0.2 MG: 0.1 TABLET ORAL at 08:45

## 2021-02-27 RX ADMIN — OXYCODONE HYDROCHLORIDE AND ACETAMINOPHEN 1 TABLET: 7.5; 325 TABLET ORAL at 04:31

## 2021-02-27 RX ADMIN — GABAPENTIN 400 MG: 400 CAPSULE ORAL at 22:17

## 2021-02-27 RX ADMIN — Medication 10 ML: at 06:05

## 2021-02-27 RX ADMIN — IPRATROPIUM BROMIDE AND ALBUTEROL SULFATE 3 ML: .5; 3 SOLUTION RESPIRATORY (INHALATION) at 21:50

## 2021-02-27 RX ADMIN — ATORVASTATIN CALCIUM 20 MG: 20 TABLET, FILM COATED ORAL at 22:18

## 2021-02-27 RX ADMIN — CITALOPRAM HYDROBROMIDE 20 MG: 20 TABLET ORAL at 06:11

## 2021-02-27 RX ADMIN — ACETAMINOPHEN 1000 MG: 500 TABLET ORAL at 15:54

## 2021-02-27 RX ADMIN — GABAPENTIN 300 MG: 300 CAPSULE ORAL at 08:45

## 2021-02-27 RX ADMIN — FERROUS SULFATE TAB 325 MG (65 MG ELEMENTAL FE) 325 MG: 325 (65 FE) TAB at 09:47

## 2021-02-27 RX ADMIN — Medication 10 ML: at 15:56

## 2021-02-27 RX ADMIN — METHYLPREDNISOLONE SODIUM SUCCINATE 40 MG: 40 INJECTION, POWDER, FOR SOLUTION INTRAMUSCULAR; INTRAVENOUS at 14:43

## 2021-02-27 RX ADMIN — ALBUTEROL SULFATE: 2.5 SOLUTION RESPIRATORY (INHALATION) at 10:00

## 2021-02-27 RX ADMIN — VANCOMYCIN HYDROCHLORIDE 750 MG: 750 INJECTION, POWDER, LYOPHILIZED, FOR SOLUTION INTRAVENOUS at 04:32

## 2021-02-27 RX ADMIN — ACETAMINOPHEN 1000 MG: 500 TABLET ORAL at 22:17

## 2021-02-27 RX ADMIN — POTASSIUM CHLORIDE 20 MEQ: 750 TABLET, FILM COATED, EXTENDED RELEASE ORAL at 08:45

## 2021-02-27 RX ADMIN — GABAPENTIN 400 MG: 400 CAPSULE ORAL at 15:54

## 2021-02-27 RX ADMIN — FAMOTIDINE 20 MG: 20 TABLET ORAL at 22:18

## 2021-02-27 RX ADMIN — MONTELUKAST 10 MG: 10 TABLET, FILM COATED ORAL at 22:17

## 2021-02-27 NOTE — PROGRESS NOTES
Problem: Self Care Deficits Care Plan (Adult)  Goal: *Acute Goals and Plan of Care (Insert Text)  Description: Occupational Therapy Goals  Initiated 2/27/2021 within 7 day(s). 1.  Patient will perform upper body dressing with modified independence. 2.  Patient will perform lower body dressing with modified independence. 3.  Patient will perform all aspects of toileting with modified independence. 4.  Patient will participate in upper extremity therapeutic exercise/activities with supervision/set-up for 5 minutes in order to decrease pain during self-care routine. 5.  Patient will utilize energy conservation techniques during functional activities with verbal cues. Prior Level of Function: Patient reported she lived alone and was independent in self-care tasks PTA. She reported she has a second story that she cannot access due to becoming SOB. Patient reported she takes close to an hour to get dressed in the morning due to SOB and pain. Outcome: Progressing Towards Goal     Problem: Patient Education: Go to Patient Education Activity  Goal: Patient/Family Education  Outcome: Progressing Towards Goal   OCCUPATIONAL THERAPY EVALUATION    Patient: Kevon Silva (57 y.o. female)  Date: 2/27/2021  Primary Diagnosis: Bilateral lower leg cellulitis [L03.116, L03.115]  COPD with acute exacerbation ([address] [J44.1]  Hypokalemia [E87.6]  COPD exacerbation ([address] [J44.1]  Precautions:   Fall    ASSESSMENT :  Based on the objective data described below, the patient presents with decreased BUE AROM and strength, decreased activity tolerance, decreased functional endurance, and high pain impacting participation and independence in self care routine. Patient performed self-feeding with modified independence in bed. Patient long sat in bed independently. Patient refused transfers at this time due to pain in BLEs.   Patient would benefit from occupational therapy services to improve strength and endurance, educate on pain management strategies, and educate on ECON strategies for improved independence in self care tasks. Patient will benefit from skilled intervention to address the above impairments. Patient's rehabilitation potential is considered to be Good  Factors which may influence rehabilitation potential include:   []             None noted  [x]             Mental ability/status  [x]             Medical condition  []             Home/family situation and support systems  []             Safety awareness  [x]             Pain tolerance/management  []             Other:      PLAN :  Recommendations and Planned Interventions:   [x]               Self Care Training                  [x]      Therapeutic Activities  [x]               Functional Mobility Training   []      Cognitive Retraining  [x]               Therapeutic Exercises           [x]      Endurance Activities  []               Balance Training                    []      Neuromuscular Re-Education  []               Visual/Perceptual Training     [x]      Home Safety Training  [x]               Patient Education                   []      Family Training/Education  []               Other (comment):    Frequency/Duration: Patient will be followed by occupational therapy 1-2 times per day/4-7 days per week to address goals. Discharge Recommendations: Home Health pending progress  Further Equipment Recommendations for Discharge: TBD     SUBJECTIVE:   Patient stated It takes me so long to get dressed because I get short of breath.     OBJECTIVE DATA SUMMARY:     Past Medical History:   Diagnosis Date    [facility]estive [facility]) [facility]     Chronic respiratory failure with hypoxia ([facility] 9/7/2020    CKD (chronic kidney disease) stage 3, GFR 30-59 ml/min 10/31/2019    Cocaine abuse ([facility] 11/26/2017    COPD (chronic obstructive pulmonary disease) with chronic bronchitis ([facility] 12/19/2017    Dependence on supplemental oxygen     Depression 3/22/2020    Drug-seeking behavior 11/19/2016    Endocrine disease     thyroid issues    Essential hypertension 3/10/2017    Fe deficiency anemia 10/27/2012    Fibromyalgia 12/16/2016    Gastroesophageal reflux disease without esophagitis 10/10/2016    Gastrointestinal disorder     \"blockage in my stomach\"    Hypercholesterolemia     Hypertension     Hypertensive heart failure ([address] 12/19/2017    Morbid obesity due to excess calories ([address] 3/10/2017    NSTEMI (non-ST elevated myocardial infarction) ([address] 3/22/2020    On home O2 12/3/2015    Overview:  2L at home per pt for approx 8 years    Polysubstance abuse ([address] 9/15/2018    Respiratory failure ([address] 1/11/2017    S/P hernia repair 10/31/2019    Sleep apnea 12/19/2017    T wave inversion in EKG 3/9/2019    Tobacco use 4/42/2832    Uncomplicated severe persistent asthma 12/13/2016    Vocal cord disease 12/7/2016     Past Surgical History:   Procedure Laterality Date    HX GI      Exploratory laparotomy with lysis of adhesions and primary repair of incarcerated umbilical hernia     Barriers to Learning/Limitations: None    Home Situation:   Home Situation  Home Environment: Private residence  # Steps to Enter: 2  One/Two Story Residence: Two story, live on 1st floor  Living Alone: Yes  Support Systems: Friends \ neighbors  Patient Expects to be Discharged to[de-identified] Private residence  Current DME Used/Available at Home: None  Tub or Shower Type: Tub/Shower combination  [x]  Right hand dominant   []  Left hand dominant    Cognitive/Behavioral Status:  Neurologic State: Alert  Orientation Level: Oriented X4  Cognition: Follows commands    Skin: Intact in BUEs  Edema: No edema noted but patient complains of swelling and pain.     Vision/Perceptual:    Tracking: Able to track stimulus in all quadrants w/o difficulty      Coordination: BUE  Coordination: Within functional limits    Balance:  Sitting: Intact (Long sit in bed without support)  Standing: (patient refused)    Strength: BUE  Strength: Generally decreased, functional    Tone & Sensation: BUE  Tone: Normal  Sensation: Intact    Range of Motion: BUE  AROM: Generally decreased, functional    Functional Mobility and Transfers for ADLs:  Bed Mobility:  Supine to Sit: Modified independent(long sit in bed)    Transfers:  Patient refused. OT educated patient on importance of getting up with therapy and importance of exercise. Patient reported she will participate in therapy but did not want to \"wake my leg up\" today. ADL Assessment:   Feeding: Modified independent(opened all feeding containers and self fed)    Oral Facial Hygiene/Grooming: Modified Independent    Bathing: Moderate assistance    Upper Body Dressing: Minimum assistance    Lower Body Dressing: Moderate assistance    Toileting: Moderate assistance      ADL Intervention:  Educated patient on role of OT in acute care and OT goals. Educated patient on gentle AROM to help with pain in BUEs    Pain:  Pain level pre-treatment: 10/10   Pain level post-treatment: 10/10   Pain Intervention(s): Nurse Aware    Activity Tolerance:   Poor  Please refer to the flowsheet for vital signs taken during this treatment. After treatment:   [] Patient left in no apparent distress sitting up in chair  [x] Patient left in no apparent distress in bed  [x] Call bell left within reach  [] Nursing notified  [] Caregiver present  [] Bed alarm activated    COMMUNICATION/EDUCATION:   [x] Role of Occupational Therapy in the acute care setting  [] Home safety education was provided and the patient/caregiver indicated understanding. [x] Patient/family have participated as able in goal setting and plan of care. [x] Patient/family agree to work toward stated goals and plan of care. [] Patient understands intent and goals of therapy, but is neutral about his/her participation. [] Patient is unable to participate in goal setting and plan of care.     Thank you for this referral.  Hollie Riley OTR/L  Time Calculation: 14 mins    Eval Complexity: History: LOW Complexity : Brief history review ; Examination: LOW Complexity : 1-3 performance deficits relating to physical, cognitive , or psychosocial skils that result in activity limitations and / or participation restrictions ;    Decision Making:LOW Complexity : No comorbidities that affect functional and no verbal or physical assistance needed to complete eval tasks

## 2021-02-27 NOTE — PROGRESS NOTES
Pt upset and stated, she is leaving AMA. Pt upset that her oxycodone was found in her sock x 2. Pt asked this writer \" did you find my pills, I do that because I want to double up\"    This writer informed pt that pain meds can not he stored in socks and that the MD was notified earlier of the occurrence. also informed only IV pain meds will be given    Pt pushed this writer out of her room. Dr Jeff Carreon paged to inform him of pts intent to leave AMA.   Awaiting a return call from MD.

## 2021-02-27 NOTE — PROGRESS NOTES
Problem: Falls - Risk of  Goal: *Absence of Falls  Description: Document Arlette Smith Fall Risk and appropriate interventions in the flowsheet.   Outcome: Progressing Towards Goal  Note: Fall Risk Interventions:  Mobility Interventions: Patient to call before getting OOB         Medication Interventions: Bed/chair exit alarm    Elimination Interventions: Call light in reach              Problem: Patient Education: Go to Patient Education Activity  Goal: Patient/Family Education  Outcome: Progressing Towards Goal     Problem: Patient Education: Go to Patient Education Activity  Goal: Patient/Family Education  Outcome: Progressing Towards Goal

## 2021-02-27 NOTE — ROUTINE PROCESS
Bedside shift change report given to Mark Wiseman RN (oncoming nurse) by  Doreen Montalvo (offgoing nurse). Report included the following information SBAR, Kardex, ED Summary and Recent Results.

## 2021-02-27 NOTE — ROUTINE PROCESS
Bedside shift change report given to Isaac Rivera RN 
 (oncoming nurse) by Mark Avery RN (offgoing nurse). Report included the following information Intake/Output, MAR and Cardiac Rhythm    NS.

## 2021-02-27 NOTE — ROUTINE PROCESS
During head to toe assessment, 1 yellow pill with # [id number] was found in patients right sock. Pill identified as oxycodone Pill placed in [address] [facility] with another nurse after identification was made. Per MD Tarsha polk to D/C Po pain meds

## 2021-02-27 NOTE — PROGRESS NOTES
Patient gave verbal permission to have sister April for contact and update. Witnessed by Nigel Moore RN. Sister information is in chart updated.

## 2021-02-27 NOTE — PROGRESS NOTES
Bedside shift change report given to Nikole Cruz RN   (oncoming nurse) by Lyric Mario  (offgoing nurse). Report included the following information SBAR and Cardiac Rhythm NSR.

## 2021-02-27 NOTE — PROGRESS NOTES
Hospitalist Progress Note    Patient: Tabatha Marquis Age: 63 y.o. : 1965 MR#: [id number] SSN: [id number]  Date/Time: 2021 9:46 AM    DOA: 2021  PCP: Leif Cornejo MD    Subjective:     She is upset about her not able to get more morphine. She wants morphine and percocet as well. She still has leg pain. Swelling improved   Afebrile. Has vancomycin, ID stopped ceftriaxone. Has steroid and bronchodilator       Interval Hospital Course:      ROS: No current fever/chills, no headache, no dizziness, no facial pain, no sinus congestion,   No swallowing pain, No chest pain, no palpitation, ++ shortness of breath, no abd pain,  No diarrhea, no urinary complaint, + leg pain or swelling    Assessment/Plan:     1. Bilateral leg edema, negative DVT on duplex, ? CHF symptom? 2. Left leg cellulitis  3. Acute COPD/Asthma exacerbation, negative covid   4. Chronic respiratory failure with hypoxia   5. H/o tobacco dependence   6. Hypertensive urgency, resolved   7. History of diastolic CHF, compensated, proBNP low   8. Dyslipidemia   9. Mild renal insufficiency, only, resolved   10. Hypokalemia   11. Iron deficiency anemia   12. H/o prolonged heroin abuse     Cont IV vancomycin, follow up culture. Pain control with morphine prn   Elevated leg. ID consult appreciated. Continue steroid and bronchodilator prn  Cont daliresp, singulair, arformoterol/budesonide,   Hold lasix still.  On clonidine, add losartan for elevated SBP  Cont asa, lipitor  ISS   pepcid    Full code   Additional Notes:   Time spent >35 minutes     Case discussed with:  [x]Patient  []Family  [x]Nursing  [x]Case Management  DVT Prophylaxis:  [x]Lovenox  []Hep SQ  []SCDs  []Coumadin   []On Heparin gtt    Signed By: Ant Germain MD     2021 9:46 AM              Objective:   VS:   Visit Vitals  BP (!) 188/99 (BP Patient Position: At rest)   Pulse 70   Temp 97.6 °F (36.4 °C)   Resp 18   Wt 67.6 kg (149 lb)   LMP 2009   SpO2 95%   BMI 29.10 kg/m²      Tmax/24hrs: Temp (24hrs), Av.6 °F (36.4 °C), Min:97.4 °F (36.3 °C), Max:98 °F (36.7 °C)      Intake/Output Summary (Last 24 hours) at 2021 0946  Last data filed at 2021 6438  Gross per 24 hour   Intake 360 ml   Output 800 ml   Net -440 ml       Tele:   General:  Cooperative, Not in acute distress, speaks in full sentence while in bed  HEENT: PERRL, EOMI, supple neck, no JVD, dry oral mucosa  Cardiovascular: S1S2 regular, no rub/gallop   Pulmonary: Clear air entry bilaterally, no wheezing, no crackle  GI:  Soft, non tender, non distended, +bs, no guarding   Extremities:  + pedal edema, +distal pulses appreciated   Red/warm/tender of left leg  Neuro: AOx3, moving all extremities, no gross deficit.      Additional:       Current Facility-Administered Medications   Medication Dose Route Frequency    albuterol (PROVENTIL VENTOLIN) 2.5 mg /3 mL (0.083 %) nebulizer solution        losartan (COZAAR) tablet 25 mg  25 mg Oral DAILY    ferrous sulfate tablet 325 mg  1 Tab Oral DAILY WITH BREAKFAST    ALPRAZolam (XANAX) tablet 1 mg  1 mg Oral Q8H PRN    aspirin delayed-release tablet 81 mg  81 mg Oral DAILY    atorvastatin (LIPITOR) tablet 20 mg  20 mg Oral QHS    citalopram (CELEXA) tablet 20 mg  20 mg Oral 7am    arformoterol 15 mcg/budesonide 0.5 mg neb solution   Nebulization BID RT    [Held by provider] furosemide (LASIX) tablet 40 mg  40 mg Oral DAILY    montelukast (SINGULAIR) tablet 10 mg  10 mg Oral QHS    roflumilast (DALIRESP) tablet 500 mcg  500 mcg Oral DAILY    methylPREDNISolone (PF) (SOLU-MEDROL) injection 40 mg  40 mg IntraVENous Q8H    sodium chloride (NS) flush 5-40 mL  5-40 mL IntraVENous Q8H    sodium chloride (NS) flush 5-40 mL  5-40 mL IntraVENous PRN    acetaminophen (TYLENOL) tablet 650 mg  650 mg Oral Q6H PRN    Or    acetaminophen (TYLENOL) suppository 650 mg  650 mg Rectal Q6H PRN    polyethylene glycol (MIRALAX) packet 17 g 17 g Oral DAILY PRN    promethazine (PHENERGAN) tablet 12.5 mg  12.5 mg Oral Q6H PRN    Or    ondansetron (ZOFRAN) injection 4 mg  4 mg IntraVENous Q6H PRN    enoxaparin (LOVENOX) injection 40 mg  40 mg SubCUTAneous DAILY    oxyCODONE-acetaminophen (PERCOCET 7.5) 7.5-325 mg per tablet 1 Tab  1 Tab Oral Q6H PRN    cloNIDine HCL (CATAPRES) tablet 0.2 mg  0.2 mg Oral Q12H    potassium chloride SR (KLOR-CON 10) tablet 20 mEq  20 mEq Oral DAILY    vancomycin (VANCOCIN) 750 mg in 0.9% sodium chloride 250 mL (VIAL-MATE)  750 mg IntraVENous Q18H    albuterol-ipratropium (DUO-NEB) 2.5 MG-0.5 MG/3 ML  3 mL Nebulization Q6HWA RT    gabapentin (NEURONTIN) capsule 300 mg  300 mg Oral TID    morphine injection 2 mg  2 mg IntraVENous Q4H PRN    famotidine (PEPCID) tablet 20 mg  20 mg Oral DAILY            Lab/Data Review:  Labs: Results:       Chemistry Recent Labs     02/27/21  0012 02/26/21  0037 02/24/21  1037   * 107* 85    142 142   K 3.8 3.0* 3.5    105 104   CO2 30 31 31   BUN 21* 21* 21*   CREA 0.93 1.30 0.91   BUCR 23* 16 23*   AGAP 4 6 7   CA 8.7 8.7 9.5     Recent Labs     02/24/21  1037   ALT 19   TP 7.8   ALB 3.9   GLOB 3.9   AGRAT 1.0      CBC w/Diff Recent Labs     02/27/21  0012 02/26/21  0037 02/24/21  1037   WBC 10.2 9.9 12.1   RBC 3.82* 3.74* 4.33   HGB 10.7* 10.8* 12.6   HCT 34.0* 33.4* 38.7   MCV 89.0 89.3 89.4   MCH 28.0 28.9 29.1   MCHC 31.5 32.3 32.6   RDW 14.4 14.7* 14.2    263 251   GRANS 85* 62 83*   LYMPH 12* 31 10*   EOS 0 1 1      Coagulation No results for input(s): PTP, INR, APTT, INREXT, INREXT in the last 72 hours.     Iron/Ferritin Lab Results   Component Value Date/Time    Iron 32 (L) 02/26/2021 12:37 AM    TIBC 260 02/26/2021 12:37 AM    Iron % saturation 12 (L) 02/26/2021 12:37 AM    Ferritin 41 09/09/2020 05:09 AM       BNP    Cardiac Enzymes Lab Results   Component Value Date/Time    CK 56 02/24/2021 10:37 AM    CK - MB 2.2 02/24/2021 10:37 AM    CK-MB Index 3.9 02/24/2021 10:37 AM    Troponin-I <0.015 12/07/2015 05:23 PM    Troponin-I, QT <0.02 02/24/2021 10:37 AM    BNP 19.9 12/07/2015 05:23 PM        Lactic Acid    Thyroid Studies          All Micro Results     Procedure Component Value Units Date/Time    COVID-19 RAPID TEST [[id number]] Collected: 02/26/21 8382    Order Status: Completed Specimen: Nasopharyngeal Updated: 02/26/21 0436     Specimen source Nasopharyngeal        COVID-19 rapid test Not detected        Comment: Rapid Abbott ID Now       Rapid NAAT:  The specimen is NEGATIVE for SARS-CoV-2, the novel coronavirus associated with COVID-19. Negative results should be treated as presumptive and, if inconsistent with clinical signs and symptoms or necessary for patient management, should be tested with an alternative molecular assay. Negative results do not preclude SARS-CoV-2 infection and should not be used as the sole basis for patient management decisions. This test has been authorized by the FDA under an Emergency Use Authorization (EUA) for use by authorized laboratories. Fact sheet for Healthcare Providers: ConventionUpdate.co.nz  Fact sheet for Patients: ConventionUpdate.co.nz       Methodology: Isothermal Nucleic Acid Amplification                 Images:    CT (Most Recent). XRAY (Most Recent)      EKG No results found for this or any previous visit.      2D ECHO

## 2021-02-28 LAB
ANION GAP SERPL CALC-SCNC: 5 MMOL/L (ref 3–18)
BASOPHILS # BLD: 0 K/UL (ref 0–0.1)
BASOPHILS NFR BLD: 0 % (ref 0–2)
BNP SERPL-MCNC: 381 PG/ML (ref 0–900)
BUN SERPL-MCNC: 18 MG/DL (ref 7–18)
BUN/CREAT SERPL: 20 (ref 12–20)
CALCIUM SERPL-MCNC: 8.2 MG/DL (ref 8.5–10.1)
CHLORIDE SERPL-SCNC: 110 MMOL/L (ref 100–111)
CO2 SERPL-SCNC: 28 MMOL/L (ref 21–32)
CREAT SERPL-MCNC: 0.9 MG/DL (ref 0.6–1.3)
DATE LAST DOSE: ABNORMAL
DIFFERENTIAL METHOD BLD: ABNORMAL
EOSINOPHIL # BLD: 0 K/UL (ref 0–0.4)
EOSINOPHIL NFR BLD: 0 % (ref 0–5)
ERYTHROCYTE [DISTWIDTH] IN BLOOD BY AUTOMATED COUNT: 14.5 % (ref 11.6–14.5)
GLUCOSE SERPL-MCNC: 96 MG/DL (ref 74–99)
HCT VFR BLD AUTO: 32.4 % (ref 35–45)
HGB BLD-MCNC: 10.5 G/DL (ref 12–16)
LYMPHOCYTES # BLD: 1.9 K/UL (ref 0.9–3.6)
LYMPHOCYTES NFR BLD: 23 % (ref 21–52)
MCH RBC QN AUTO: 29.1 PG (ref 24–34)
MCHC RBC AUTO-ENTMCNC: 32.4 G/DL (ref 31–37)
MCV RBC AUTO: 89.8 FL (ref 74–97)
MONOCYTES # BLD: 0.4 K/UL (ref 0.05–1.2)
MONOCYTES NFR BLD: 5 % (ref 3–10)
NEUTS SEG # BLD: 6 K/UL (ref 1.8–8)
NEUTS SEG NFR BLD: 72 % (ref 40–73)
PLATELET # BLD AUTO: 234 K/UL (ref 135–420)
PMV BLD AUTO: 10.3 FL (ref 9.2–11.8)
POTASSIUM SERPL-SCNC: 3.5 MMOL/L (ref 3.5–5.5)
RBC # BLD AUTO: 3.61 M/UL (ref 4.2–5.3)
REPORTED DOSE,DOSE: ABNORMAL UNITS
REPORTED DOSE/TIME,TMG: 2100
SODIUM SERPL-SCNC: 143 MMOL/L (ref 136–145)
VANCOMYCIN TROUGH SERPL-MCNC: 3.5 UG/ML (ref 10–20)
WBC # BLD AUTO: 8.3 K/UL (ref 4.6–13.2)

## 2021-02-28 PROCEDURE — 77010033678 HC OXYGEN DAILY

## 2021-02-28 PROCEDURE — 99232 SBSQ HOSP IP/OBS MODERATE 35: CPT | Performed by: INTERNAL MEDICINE

## 2021-02-28 PROCEDURE — 74011000250 HC RX REV CODE- 250: Performed by: INTERNAL MEDICINE

## 2021-02-28 PROCEDURE — 80202 ASSAY OF VANCOMYCIN: CPT

## 2021-02-28 PROCEDURE — 77030038269 HC DRN EXT URIN PURWCK BARD -A

## 2021-02-28 PROCEDURE — 74011250636 HC RX REV CODE- 250/636: Performed by: INTERNAL MEDICINE

## 2021-02-28 PROCEDURE — 36415 COLL VENOUS BLD VENIPUNCTURE: CPT

## 2021-02-28 PROCEDURE — 94640 AIRWAY INHALATION TREATMENT: CPT

## 2021-02-28 PROCEDURE — 83880 ASSAY OF NATRIURETIC PEPTIDE: CPT

## 2021-02-28 PROCEDURE — 74011250637 HC RX REV CODE- 250/637: Performed by: INTERNAL MEDICINE

## 2021-02-28 PROCEDURE — 65660000000 HC RM CCU STEPDOWN

## 2021-02-28 PROCEDURE — 85025 COMPLETE CBC W/AUTO DIFF WBC: CPT

## 2021-02-28 PROCEDURE — 80048 BASIC METABOLIC PNL TOTAL CA: CPT

## 2021-02-28 RX ADMIN — CLONIDINE HYDROCHLORIDE 0.2 MG: 0.1 TABLET ORAL at 08:00

## 2021-02-28 RX ADMIN — MONTELUKAST 10 MG: 10 TABLET, FILM COATED ORAL at 21:26

## 2021-02-28 RX ADMIN — METHYLPREDNISOLONE SODIUM SUCCINATE 40 MG: 40 INJECTION, POWDER, FOR SOLUTION INTRAMUSCULAR; INTRAVENOUS at 05:20

## 2021-02-28 RX ADMIN — GABAPENTIN 400 MG: 400 CAPSULE ORAL at 08:00

## 2021-02-28 RX ADMIN — ACETAMINOPHEN 1000 MG: 500 TABLET ORAL at 14:53

## 2021-02-28 RX ADMIN — METHYLPREDNISOLONE SODIUM SUCCINATE 40 MG: 40 INJECTION, POWDER, FOR SOLUTION INTRAMUSCULAR; INTRAVENOUS at 21:26

## 2021-02-28 RX ADMIN — VANCOMYCIN HYDROCHLORIDE 1000 MG: 1 INJECTION, POWDER, LYOPHILIZED, FOR SOLUTION INTRAVENOUS at 19:14

## 2021-02-28 RX ADMIN — FUROSEMIDE 40 MG: 40 TABLET ORAL at 14:53

## 2021-02-28 RX ADMIN — ALPRAZOLAM 1 MG: 1 TABLET ORAL at 05:40

## 2021-02-28 RX ADMIN — METHYLPREDNISOLONE SODIUM SUCCINATE 40 MG: 40 INJECTION, POWDER, FOR SOLUTION INTRAMUSCULAR; INTRAVENOUS at 14:53

## 2021-02-28 RX ADMIN — MORPHINE SULFATE 4 MG: 4 INJECTION, SOLUTION INTRAMUSCULAR; INTRAVENOUS at 17:31

## 2021-02-28 RX ADMIN — Medication 10 ML: at 21:28

## 2021-02-28 RX ADMIN — MORPHINE SULFATE 4 MG: 4 INJECTION, SOLUTION INTRAMUSCULAR; INTRAVENOUS at 21:27

## 2021-02-28 RX ADMIN — ARFORMOTEROL TARTRATE: 15 SOLUTION RESPIRATORY (INHALATION) at 08:21

## 2021-02-28 RX ADMIN — MORPHINE SULFATE 4 MG: 4 INJECTION, SOLUTION INTRAMUSCULAR; INTRAVENOUS at 05:07

## 2021-02-28 RX ADMIN — FERROUS SULFATE TAB 325 MG (65 MG ELEMENTAL FE) 325 MG: 325 (65 FE) TAB at 07:58

## 2021-02-28 RX ADMIN — ARFORMOTEROL TARTRATE: 15 SOLUTION RESPIRATORY (INHALATION) at 21:31

## 2021-02-28 RX ADMIN — GABAPENTIN 400 MG: 400 CAPSULE ORAL at 17:31

## 2021-02-28 RX ADMIN — GABAPENTIN 400 MG: 400 CAPSULE ORAL at 21:27

## 2021-02-28 RX ADMIN — ACETAMINOPHEN 1000 MG: 500 TABLET ORAL at 21:26

## 2021-02-28 RX ADMIN — LOSARTAN POTASSIUM 25 MG: 25 TABLET, FILM COATED ORAL at 08:00

## 2021-02-28 RX ADMIN — MORPHINE SULFATE 4 MG: 4 INJECTION, SOLUTION INTRAMUSCULAR; INTRAVENOUS at 13:02

## 2021-02-28 RX ADMIN — CITALOPRAM HYDROBROMIDE 20 MG: 20 TABLET ORAL at 06:05

## 2021-02-28 RX ADMIN — ACETAMINOPHEN 1000 MG: 500 TABLET ORAL at 05:07

## 2021-02-28 RX ADMIN — IPRATROPIUM BROMIDE AND ALBUTEROL SULFATE 3 ML: .5; 3 SOLUTION RESPIRATORY (INHALATION) at 14:27

## 2021-02-28 RX ADMIN — CLONIDINE HYDROCHLORIDE 0.2 MG: 0.1 TABLET ORAL at 21:26

## 2021-02-28 RX ADMIN — POTASSIUM CHLORIDE 20 MEQ: 750 TABLET, FILM COATED, EXTENDED RELEASE ORAL at 08:00

## 2021-02-28 RX ADMIN — FAMOTIDINE 20 MG: 20 TABLET ORAL at 21:27

## 2021-02-28 RX ADMIN — MORPHINE SULFATE 4 MG: 4 INJECTION, SOLUTION INTRAMUSCULAR; INTRAVENOUS at 09:21

## 2021-02-28 RX ADMIN — Medication 10 ML: at 17:40

## 2021-02-28 RX ADMIN — IPRATROPIUM BROMIDE AND ALBUTEROL SULFATE 3 ML: .5; 3 SOLUTION RESPIRATORY (INHALATION) at 21:31

## 2021-02-28 RX ADMIN — ROFLUMILAST 500 MCG: 500 TABLET ORAL at 08:00

## 2021-02-28 RX ADMIN — ONDANSETRON 4 MG: 2 INJECTION INTRAMUSCULAR; INTRAVENOUS at 21:27

## 2021-02-28 RX ADMIN — ALPRAZOLAM 1 MG: 1 TABLET ORAL at 14:53

## 2021-02-28 RX ADMIN — IPRATROPIUM BROMIDE AND ALBUTEROL SULFATE 3 ML: .5; 3 SOLUTION RESPIRATORY (INHALATION) at 08:21

## 2021-02-28 RX ADMIN — Medication 81 MG: at 08:00

## 2021-02-28 RX ADMIN — Medication 10 ML: at 06:00

## 2021-02-28 RX ADMIN — ATORVASTATIN CALCIUM 20 MG: 20 TABLET, FILM COATED ORAL at 21:26

## 2021-02-28 RX ADMIN — FAMOTIDINE 20 MG: 20 TABLET ORAL at 08:00

## 2021-02-28 NOTE — PROGRESS NOTES
Patient has complaints of pain medications. Stating she still has sharp shooting pain especially in the left leg. Also, wanted to know about xanax medications dosage wanted to discuss with physician about it.

## 2021-02-28 NOTE — PROGRESS NOTES
Bedside shift change report given to Jaxson Avila  (oncoming nurse) by Janette Adams RN (offgoing nurse). Report included the following information SBAR, Kardex and MAR.

## 2021-02-28 NOTE — PROGRESS NOTES
Hospitalist Progress Note    Patient: Colonel Lorenzo Age: 63 y.o. : 1965 MR#: [id number] SSN: [id number]  Date/Time: 2021 9:32 AM    DOA: 2021  PCP: Elizabeth Liu MD    Subjective:     SHE ASKED FOR MORE PAIN MEDICATION. Stated that her pain radiated to back of her thighs. No fever. Swelling improved   No leukocytosis. Still on Vancomycin. No diarrhea. No urinary complaint     Still with shortness of breath. +cough. Tolerates IV steroid and bronchodilator       Interval Hospital Course:      ROS: No current fever/chills, no headache, no dizziness, no facial pain, no sinus congestion,   No swallowing pain, No chest pain, no palpitation, ++ shortness of breath, no abd pain,  No diarrhea, no urinary complaint, + leg pain or swelling    Assessment/Plan:     1. Bilateral leg edema, negative DVT on duplex, ? CHF symptom? 2. Left leg cellulitis  3. Acute COPD/Asthma exacerbation, negative covid   4. Chronic respiratory failure with hypoxia   5. H/o tobacco dependence   6. Hypertensive urgency, resolved   7. History of diastolic CHF, compensated, proBNP low, doubt that she is in exacerbation  8. Dyslipidemia   9. Mild renal insufficiency, only, resolved   10. Hypokalemia   11. Iron deficiency anemia   12. H/o prolonged heroin abuse     I think she is pain medication seeking. Spoke with her that I cannot increase her morphine as it can cause respiratory depression  Will increase her Gabapentin as this could be neuropathic pain   She can have lidocain patch to back of her knees  Elevated leg. ID consult appreciated. Continue steroid and bronchodilator prn  Cont daliresp, singulair, arformoterol/budesonide,   Can resume lasix tomorrow.  On clonidine, cont losartan for elevated SBP  Cont asa, lipitor  ISS   pepcid    Full code   Additional Notes:   Time spent >35 minutes     Case discussed with:  [x]Patient  []Family  [x]Nursing  [x]Case Management  DVT Prophylaxis: [x]Lovenox  []Hep SQ  []SCDs  []Coumadin   []On Heparin gtt    Signed By: Hiral Delgado MD     2021 9:32 AM              Objective:   VS:   Visit Vitals  BP (!) 150/84 (BP 1 Location: Right upper arm, BP Patient Position: At rest)   Pulse 64   Temp 98 °F (36.7 °C)   Resp 18   Wt 67.6 kg (149 lb)   LMP 2009   SpO2 96%   BMI 29.10 kg/m²      Tmax/24hrs: Temp (24hrs), Av.3 °F (36.8 °C), Min:97.8 °F (36.6 °C), Max:99.3 °F (37.4 °C)      Intake/Output Summary (Last 24 hours) at 2021 0932  Last data filed at 2021 0919  Gross per 24 hour   Intake 900 ml   Output    Net 900 ml       Tele:   General:  Cooperative, Not in acute distress, speaks in full sentence while in bed  HEENT: PERRL, EOMI, supple neck, no JVD, dry oral mucosa  Cardiovascular: S1S2 regular, no rub/gallop   Pulmonary: Clear air entry bilaterally, no wheezing, no crackle  GI:  Soft, non tender, non distended, +bs, no guarding   Extremities:  + pedal edema, +distal pulses appreciated   Red/warm/tender of left leg  Neuro: AOx3, moving all extremities, no gross deficit.      Additional:       Current Facility-Administered Medications   Medication Dose Route Frequency    losartan (COZAAR) tablet 25 mg  25 mg Oral DAILY    ferrous sulfate tablet 325 mg  1 Tab Oral DAILY WITH BREAKFAST    Vancomycin Trough Prior to 1600 Dose  1 Each Other ONCE    famotidine (PEPCID) tablet 20 mg  20 mg Oral Q12H    morphine injection 4 mg  4 mg IntraVENous Q4H PRN    acetaminophen (TYLENOL) tablet 1,000 mg  1,000 mg Oral Q8H    gabapentin (NEURONTIN) capsule 400 mg  400 mg Oral TID    lidocaine 4 % patch 2 Patch  2 Patch TransDERmal Q24H    ALPRAZolam (XANAX) tablet 1 mg  1 mg Oral Q8H PRN    aspirin delayed-release tablet 81 mg  81 mg Oral DAILY    atorvastatin (LIPITOR) tablet 20 mg  20 mg Oral QHS    citalopram (CELEXA) tablet 20 mg  20 mg Oral 7am    arformoterol 15 mcg/budesonide 0.5 mg neb solution   Nebulization BID RT    furosemide (LASIX) tablet 40 mg  40 mg Oral DAILY    montelukast (SINGULAIR) tablet 10 mg  10 mg Oral QHS    roflumilast (DALIRESP) tablet 500 mcg  500 mcg Oral DAILY    methylPREDNISolone (PF) (SOLU-MEDROL) injection 40 mg  40 mg IntraVENous Q8H    sodium chloride (NS) flush 5-40 mL  5-40 mL IntraVENous Q8H    sodium chloride (NS) flush 5-40 mL  5-40 mL IntraVENous PRN    acetaminophen (TYLENOL) tablet 650 mg  650 mg Oral Q6H PRN    Or    acetaminophen (TYLENOL) suppository 650 mg  650 mg Rectal Q6H PRN    polyethylene glycol (MIRALAX) packet 17 g  17 g Oral DAILY PRN    promethazine (PHENERGAN) tablet 12.5 mg  12.5 mg Oral Q6H PRN    Or    ondansetron (ZOFRAN) injection 4 mg  4 mg IntraVENous Q6H PRN    enoxaparin (LOVENOX) injection 40 mg  40 mg SubCUTAneous DAILY    cloNIDine HCL (CATAPRES) tablet 0.2 mg  0.2 mg Oral Q12H    potassium chloride SR (KLOR-CON 10) tablet 20 mEq  20 mEq Oral DAILY    vancomycin (VANCOCIN) 750 mg in 0.9% sodium chloride 250 mL (VIAL-MATE)  750 mg IntraVENous Q18H    albuterol-ipratropium (DUO-NEB) 2.5 MG-0.5 MG/3 ML  3 mL Nebulization Q6HWA RT            Lab/Data Review:  Labs: Results:       Chemistry Recent Labs     02/28/21 0107 02/27/21 0012 02/26/21 0037   GLU 96 115* 107*    144 142   K 3.5 3.8 3.0*    110 105   CO2 28 30 31   BUN 18 21* 21*   CREA 0.90 0.93 1.30   BUCR 20 23* 16   AGAP 5 4 6   CA 8.2* 8.7 8.7     No results for input(s): TBIL, ALT, ALKP, TP, ALB, GLOB, AGRAT in the last 72 hours. No lab exists for component: SGOT   CBC w/Diff Recent Labs     02/28/21 0107 02/27/21 0012 02/26/21  0037   WBC 8.3 10.2 9.9   RBC 3.61* 3.82* 3.74*   HGB 10.5* 10.7* 10.8*   HCT 32.4* 34.0* 33.4*   MCV 89.8 89.0 89.3   MCH 29.1 28.0 28.9   MCHC 32.4 31.5 32.3   RDW 14.5 14.4 14.7*    251 263   GRANS 72 85* 62   LYMPH 23 12* 31   EOS 0 0 1      Coagulation No results for input(s): PTP, INR, APTT, INREXT, INREXT in the last 72 hours. Iron/Ferritin Lab Results   Component Value Date/Time    Iron 32 (L) 02/26/2021 12:37 AM    TIBC 260 02/26/2021 12:37 AM    Iron % saturation 12 (L) 02/26/2021 12:37 AM    Ferritin 41 09/09/2020 05:09 AM       BNP    Cardiac Enzymes Lab Results   Component Value Date/Time    CK 56 02/24/2021 10:37 AM    CK - MB 2.2 02/24/2021 10:37 AM    CK-MB Index 3.9 02/24/2021 10:37 AM    Troponin-I <0.015 12/07/2015 05:23 PM    Troponin-I, QT <0.02 02/24/2021 10:37 AM    BNP 19.9 12/07/2015 05:23 PM        Lactic Acid    Thyroid Studies          All Micro Results     Procedure Component Value Units Date/Time    COVID-19 RAPID TEST [[id number]] Collected: 02/26/21 3310    Order Status: Completed Specimen: Nasopharyngeal Updated: 02/26/21 0436     Specimen source Nasopharyngeal        COVID-19 rapid test Not detected        Comment: Rapid Abbott ID Now       Rapid NAAT:  The specimen is NEGATIVE for SARS-CoV-2, the novel coronavirus associated with COVID-19. Negative results should be treated as presumptive and, if inconsistent with clinical signs and symptoms or necessary for patient management, should be tested with an alternative molecular assay. Negative results do not preclude SARS-CoV-2 infection and should not be used as the sole basis for patient management decisions. This test has been authorized by the FDA under an Emergency Use Authorization (EUA) for use by authorized laboratories. Fact sheet for Healthcare Providers: ConventionUpdate.co.nz  Fact sheet for Patients: ConventionUpdate.co.nz       Methodology: Isothermal Nucleic Acid Amplification                 Images:    CT (Most Recent). XRAY (Most Recent)      EKG No results found for this or any previous visit.      2D ECHO

## 2021-02-28 NOTE — PROGRESS NOTES
Problem: Falls - Risk of  Goal: *Absence of Falls  Description: Document Tyshawn Rodriguez Fall Risk and appropriate interventions in the flowsheet.   Outcome: Not Progressing Towards Goal  Note: Fall Risk Interventions:  Mobility Interventions: Bed/chair exit alarm         Medication Interventions: Patient to call before getting OOB, Teach patient to arise slowly    Elimination Interventions: Call light in reach              Problem: Patient Education: Go to Patient Education Activity  Goal: Patient/Family Education  Outcome: Not Progressing Towards Goal     Problem: Patient Education: Go to Patient Education Activity  Goal: Patient/Family Education  Outcome: Not Progressing Towards Goal     Problem: Patient Education: Go to Patient Education Activity  Goal: Patient/Family Education  Outcome: Not Progressing Towards Goal

## 2021-03-01 VITALS
RESPIRATION RATE: 20 BRPM | DIASTOLIC BLOOD PRESSURE: 88 MMHG | OXYGEN SATURATION: 99 % | TEMPERATURE: 98.1 F | HEART RATE: 72 BPM | WEIGHT: 180.4 LBS | SYSTOLIC BLOOD PRESSURE: 163 MMHG | BODY MASS INDEX: 35.23 KG/M2

## 2021-03-01 LAB
ANION GAP SERPL CALC-SCNC: 5 MMOL/L (ref 3–18)
BUN SERPL-MCNC: 18 MG/DL (ref 7–18)
BUN/CREAT SERPL: 20 (ref 12–20)
CALCIUM SERPL-MCNC: 8.4 MG/DL (ref 8.5–10.1)
CHLORIDE SERPL-SCNC: 108 MMOL/L (ref 100–111)
CO2 SERPL-SCNC: 28 MMOL/L (ref 21–32)
CREAT SERPL-MCNC: 0.88 MG/DL (ref 0.6–1.3)
ERYTHROCYTE [DISTWIDTH] IN BLOOD BY AUTOMATED COUNT: 14.5 % (ref 11.6–14.5)
GLUCOSE SERPL-MCNC: 124 MG/DL (ref 74–99)
HCT VFR BLD AUTO: 37.3 % (ref 35–45)
HGB BLD-MCNC: 11.7 G/DL (ref 12–16)
MCH RBC QN AUTO: 28.3 PG (ref 24–34)
MCHC RBC AUTO-ENTMCNC: 31.4 G/DL (ref 31–37)
MCV RBC AUTO: 90.1 FL (ref 74–97)
PLATELET # BLD AUTO: 280 K/UL (ref 135–420)
PMV BLD AUTO: 10.6 FL (ref 9.2–11.8)
POTASSIUM SERPL-SCNC: 4.6 MMOL/L (ref 3.5–5.5)
RBC # BLD AUTO: 4.14 M/UL (ref 4.2–5.3)
SODIUM SERPL-SCNC: 141 MMOL/L (ref 136–145)
WBC # BLD AUTO: 12.7 K/UL (ref 4.6–13.2)

## 2021-03-01 PROCEDURE — 74011250636 HC RX REV CODE- 250/636: Performed by: INTERNAL MEDICINE

## 2021-03-01 PROCEDURE — 85027 COMPLETE CBC AUTOMATED: CPT

## 2021-03-01 PROCEDURE — 36415 COLL VENOUS BLD VENIPUNCTURE: CPT

## 2021-03-01 PROCEDURE — 74011250637 HC RX REV CODE- 250/637: Performed by: INTERNAL MEDICINE

## 2021-03-01 PROCEDURE — 2709999900 HC NON-CHARGEABLE SUPPLY

## 2021-03-01 PROCEDURE — 99239 HOSP IP/OBS DSCHRG MGMT >30: CPT | Performed by: INTERNAL MEDICINE

## 2021-03-01 PROCEDURE — 80048 BASIC METABOLIC PNL TOTAL CA: CPT

## 2021-03-01 RX ORDER — OXYCODONE AND ACETAMINOPHEN 7.5; 325 MG/1; MG/1
1 TABLET ORAL
Qty: 21 TAB | Refills: 0 | Status: SHIPPED | OUTPATIENT
Start: 2021-03-01 | End: 2021-03-08

## 2021-03-01 RX ORDER — PREDNISONE 20 MG/1
40 TABLET ORAL
Status: DISCONTINUED | OUTPATIENT
Start: 2021-03-01 | End: 2021-03-01 | Stop reason: HOSPADM

## 2021-03-01 RX ORDER — DOXYCYCLINE 100 MG/1
100 CAPSULE ORAL EVERY 12 HOURS
Status: DISCONTINUED | OUTPATIENT
Start: 2021-03-01 | End: 2021-03-01 | Stop reason: HOSPADM

## 2021-03-01 RX ORDER — CLONIDINE HYDROCHLORIDE 0.2 MG/1
0.2 TABLET ORAL EVERY 12 HOURS
Qty: 60 TAB | Refills: 0 | Status: SHIPPED | OUTPATIENT
Start: 2021-03-01

## 2021-03-01 RX ORDER — LOSARTAN POTASSIUM 25 MG/1
25 TABLET ORAL DAILY
Qty: 30 TAB | Refills: 0 | Status: SHIPPED | OUTPATIENT
Start: 2021-03-02

## 2021-03-01 RX ORDER — ALBUTEROL SULFATE 0.83 MG/ML
SOLUTION RESPIRATORY (INHALATION)
Qty: 60 EACH | Refills: 1 | Status: SHIPPED | OUTPATIENT
Start: 2021-03-01 | End: 2021-04-10 | Stop reason: SDUPTHER

## 2021-03-01 RX ORDER — FAMOTIDINE 20 MG/1
20 TABLET, FILM COATED ORAL
Qty: 10 TAB | Refills: 0 | Status: SHIPPED | OUTPATIENT
Start: 2021-03-01

## 2021-03-01 RX ORDER — ACETAMINOPHEN 500 MG
500 TABLET ORAL
Status: DISCONTINUED | OUTPATIENT
Start: 2021-03-01 | End: 2021-03-01 | Stop reason: HOSPADM

## 2021-03-01 RX ORDER — CLINDAMYCIN HYDROCHLORIDE 150 MG/1
300 CAPSULE ORAL 3 TIMES DAILY
Status: DISCONTINUED | OUTPATIENT
Start: 2021-03-01 | End: 2021-03-01 | Stop reason: HOSPADM

## 2021-03-01 RX ORDER — CLINDAMYCIN HYDROCHLORIDE 300 MG/1
300 CAPSULE ORAL 4 TIMES DAILY
Qty: 28 CAP | Refills: 0 | Status: SHIPPED | OUTPATIENT
Start: 2021-03-01 | End: 2021-03-08

## 2021-03-01 RX ORDER — DOXYCYCLINE 100 MG/1
100 CAPSULE ORAL EVERY 12 HOURS
Qty: 14 CAP | Refills: 0 | Status: SHIPPED | OUTPATIENT
Start: 2021-03-01 | End: 2021-03-08

## 2021-03-01 RX ORDER — PREDNISONE 10 MG/1
TABLET ORAL
Qty: 60 TAB | Refills: 0 | Status: SHIPPED | OUTPATIENT
Start: 2021-03-01 | End: 2021-03-15

## 2021-03-01 RX ORDER — UREA 10 %
2 LOTION (ML) TOPICAL 2 TIMES DAILY
Status: DISCONTINUED | OUTPATIENT
Start: 2021-03-01 | End: 2021-03-01 | Stop reason: HOSPADM

## 2021-03-01 RX ORDER — UREA 10 %
2 LOTION (ML) TOPICAL 2 TIMES DAILY
Qty: 40 TAB | Refills: 0 | Status: SHIPPED | OUTPATIENT
Start: 2021-03-01 | End: 2021-03-11

## 2021-03-01 RX ADMIN — Medication 81 MG: at 09:03

## 2021-03-01 RX ADMIN — ALPRAZOLAM 1 MG: 1 TABLET ORAL at 09:05

## 2021-03-01 RX ADMIN — LOSARTAN POTASSIUM 25 MG: 25 TABLET, FILM COATED ORAL at 09:04

## 2021-03-01 RX ADMIN — VANCOMYCIN HYDROCHLORIDE 1000 MG: 1 INJECTION, POWDER, LYOPHILIZED, FOR SOLUTION INTRAVENOUS at 06:00

## 2021-03-01 RX ADMIN — MORPHINE SULFATE 4 MG: 4 INJECTION, SOLUTION INTRAMUSCULAR; INTRAVENOUS at 05:04

## 2021-03-01 RX ADMIN — POTASSIUM CHLORIDE 20 MEQ: 750 TABLET, FILM COATED, EXTENDED RELEASE ORAL at 09:04

## 2021-03-01 RX ADMIN — Medication 5 ML: at 06:00

## 2021-03-01 RX ADMIN — ROFLUMILAST 500 MCG: 500 TABLET ORAL at 09:04

## 2021-03-01 RX ADMIN — ACETAMINOPHEN 1000 MG: 500 TABLET ORAL at 05:05

## 2021-03-01 RX ADMIN — FERROUS SULFATE TAB 325 MG (65 MG ELEMENTAL FE) 325 MG: 325 (65 FE) TAB at 09:03

## 2021-03-01 RX ADMIN — CLONIDINE HYDROCHLORIDE 0.2 MG: 0.1 TABLET ORAL at 09:04

## 2021-03-01 RX ADMIN — MORPHINE SULFATE 4 MG: 4 INJECTION, SOLUTION INTRAMUSCULAR; INTRAVENOUS at 09:05

## 2021-03-01 RX ADMIN — METHYLPREDNISOLONE SODIUM SUCCINATE 40 MG: 40 INJECTION, POWDER, FOR SOLUTION INTRAMUSCULAR; INTRAVENOUS at 05:04

## 2021-03-01 RX ADMIN — FAMOTIDINE 20 MG: 20 TABLET ORAL at 09:03

## 2021-03-01 RX ADMIN — GABAPENTIN 400 MG: 400 CAPSULE ORAL at 09:03

## 2021-03-01 RX ADMIN — FUROSEMIDE 40 MG: 40 TABLET ORAL at 09:04

## 2021-03-01 RX ADMIN — CITALOPRAM HYDROBROMIDE 20 MG: 20 TABLET ORAL at 09:03

## 2021-03-01 NOTE — PROGRESS NOTES
conducted an initial consultation and Spiritual Assessment for Gary Barth, who is a 63 y.o.,female. Patients Primary Language is: [place]. According to the patients EMR [facility] Affiliation is: [facility].      The reason the Patient came to the hospital is:   Patient Active Problem List    Diagnosis Date Noted    Hypokalemia 02/26/2021    Bilateral lower leg cellulitis 02/26/2021    Long QT interval 01/08/2021    CHF (congestive heart failure) ([address] 12/23/2020    Acute bronchitis with chronic obstructive pulmonary disease (COPD) ([address] 11/28/2020    Asthma exacerbation 11/12/2020    COPD (chronic obstructive pulmonary disease) ([address] 11/12/2020    UTI (urinary tract infection) 10/08/2020    Atypical chest pain 10/08/2020    DEMARCUS (acute kidney injury) ([address] 10/08/2020    Acute exacerbation of chronic obstructive pulmonary disease (COPD) ([address] 09/14/2020    COPD with acute exacerbation ([address] 09/07/2020    Chronic respiratory failure with hypoxia ([address] 09/07/2020    Acute bronchitis 09/07/2020    Suspected COVID-19 virus infection 09/07/2020    Tachycardia 09/07/2020    Normocytic anemia 09/07/2020    Acute respiratory distress 09/07/2020    COPD exacerbation ([address] 09/07/2020    Depression 03/22/2020    S/P hernia repair 10/31/2019    CKD (chronic kidney disease) stage 3, GFR 30-59 ml/min 10/31/2019    Tobacco use 09/28/2019    T wave inversion in EKG 03/09/2019    HLD (hyperlipidemia) 09/15/2018    Polysubstance abuse ([address] 09/15/2018    Hypertensive heart failure ([address] 12/19/2017    Sleep apnea 12/19/2017    COPD (chronic obstructive pulmonary disease) with chronic bronchitis ([address] 12/19/2017    Cocaine abuse ([address] 11/26/2017    Essential hypertension 03/10/2017    Morbid obesity due to excess calories ([address] 03/10/2017    Acute exacerbation of COPD with asthma ([address] 03/02/2017    Respiratory failure ([address] 01/11/2017    Fibromyalgia 40/82/2223    Uncomplicated severe persistent asthma 12/13/2016    Vocal cord disease 12/07/2016    Drug-seeking behavior 11/19/2016    Gastroesophageal reflux disease without esophagitis 10/10/2016    Thyroid goiter 06/30/2016    Asthma 03/05/2016    On home O2 12/03/2015    Abnormal CT of the chest 11/20/2015    MDRO (multiple drug resistant organisms) resistance 11/08/2012    Fe deficiency anemia 10/27/2012        The  provided the following Interventions:  Initiated a relationship of care and support. Providing clothing. Plan:  Chaplains will continue to follow and will provide pastoral care on an as needed/requested basis.  recommends bedside caregivers page  on duty if patient shows signs of acute spiritual or emotional distress.     [address]  [phone])

## 2021-03-01 NOTE — PROGRESS NOTES
Discharge/Transition Planning    Care Management following and chart reviewed. Plan is Home and [address]. Pt with no SNF benefit.  Encourage pt with mobility      Elizabeth Altamirano RN BSN  Outcomes Manager    Pager # [phone]

## 2021-03-01 NOTE — PROGRESS NOTES
Hospitalist Progress Note    Patient: Christel Jaquez Age: 63 y.o. : 1965 MR#: [id number] SSN: [id number]  Date/Time: 3/1/2021 9:46 AM    DOA: 2021  PCP: Aidan Muir MD    Subjective:     Feeling better. Leg pain present. Dyspnea on exertion at his baseline. Patient states she uses oxygen at home. No chest pain or abdominal pain. No cough. She was walking in hallway without showing any sign of discomfort after I told her that she can go home. Objective:     BP (!) 163/88   Pulse 72   Temp 98.1 °F (36.7 °C)   Resp 20   Wt 81.8 kg (180 lb 6.4 oz)   LMP 2009   SpO2 99%   BMI 35.23 kg/m²       Intake/Output Summary (Last 24 hours) at 3/1/2021 1018  Last data filed at 3/1/2021 0905  Gross per 24 hour   Intake 1400 ml   Output 900 ml   Net 500 ml       General appearance - alert, well appearing, and in no distress  Chest -diminished air entry bilaterally, no wheezes currently. Heart - S1 and S2 normal  Abdomen - soft, nontender, nondistended, Bowel sounds present  Neurological - alert, oriented, normal speech, no focal findings noted, no ataxia  Extremities -nonpitting pedal edema noted, no erythema or increase warmth noted on both lower extremity, patient has some scratches over both calf region, she also has an old scab on left lower middle shin. Assessment/Plan:     1. Bilateral leg pain, improving. No DVT. 2.  Left leg cellulitis, clinically much better  3. Acute COPD/Asthma exacerbation, negative covid, much better  4. Chronic respiratory failure with hypoxia, stable  5. H/o tobacco dependence   6. Hypertensive urgency, resolved   7. History of diastolic CHF, compensated. 8.  Dyslipidemia   9. Mild renal insufficiency, improved. 10.  Hypokalemia, better  11. Iron deficiency anemia   12.   H/o prolonged heroin abuse     PLAN:    Discontinue IV vancomycin and start patient on clindamycin and doxycycline for 1 week with probiotic  Blood culture x1 -  Continue clonidine and losartan for hypertension management  Percocet as needed for bilateral leg pain management  Change patient on p.o. steroid  Discharge patient home today    Case discussed with:  [x]Patient  []Family  [x]Nursing  [x]Case Management  DVT Prophylaxis:  [x]Lovenox  []Hep SQ  []SCDs  []Coumadin   []On Heparin gtt    Signed By: Brie Gregory MD     March 1, 2021         Current Facility-Administered Medications   Medication Dose Route Frequency    [START ON 3/2/2021] Vancomycin Trough Prior to 0600 Dose  1 Each Other ONCE    predniSONE (DELTASONE) tablet 40 mg  40 mg Oral DAILY WITH BREAKFAST    acetaminophen (TYLENOL) tablet 500 mg  500 mg Oral Q6H PRN    clindamycin (CLEOCIN) capsule 300 mg  300 mg Oral TID    doxycycline (MONODOX) capsule 100 mg  100 mg Oral Q12H    Lactobacillus Acidoph & Bulgar (FLORANEX) tablet 2 Tab  2 Tab Oral BID    losartan (COZAAR) tablet 25 mg  25 mg Oral DAILY    ferrous sulfate tablet 325 mg  1 Tab Oral DAILY WITH BREAKFAST    famotidine (PEPCID) tablet 20 mg  20 mg Oral Q12H    morphine injection 4 mg  4 mg IntraVENous Q4H PRN    gabapentin (NEURONTIN) capsule 400 mg  400 mg Oral TID    lidocaine 4 % patch 2 Patch  2 Patch TransDERmal Q24H    ALPRAZolam (XANAX) tablet 1 mg  1 mg Oral Q8H PRN    aspirin delayed-release tablet 81 mg  81 mg Oral DAILY    atorvastatin (LIPITOR) tablet 20 mg  20 mg Oral QHS    citalopram (CELEXA) tablet 20 mg  20 mg Oral 7am    arformoterol 15 mcg/budesonide 0.5 mg neb solution   Nebulization BID RT    furosemide (LASIX) tablet 40 mg  40 mg Oral DAILY    montelukast (SINGULAIR) tablet 10 mg  10 mg Oral QHS    roflumilast (DALIRESP) tablet 500 mcg  500 mcg Oral DAILY    sodium chloride (NS) flush 5-40 mL  5-40 mL IntraVENous Q8H    sodium chloride (NS) flush 5-40 mL  5-40 mL IntraVENous PRN    acetaminophen (TYLENOL) tablet 650 mg  650 mg Oral Q6H PRN    Or    acetaminophen (TYLENOL) suppository 650 mg  650 mg Rectal Q6H PRN    polyethylene glycol (MIRALAX) packet 17 g  17 g Oral DAILY PRN    promethazine (PHENERGAN) tablet 12.5 mg  12.5 mg Oral Q6H PRN    Or    ondansetron (ZOFRAN) injection 4 mg  4 mg IntraVENous Q6H PRN    enoxaparin (LOVENOX) injection 40 mg  40 mg SubCUTAneous DAILY    cloNIDine HCL (CATAPRES) tablet 0.2 mg  0.2 mg Oral Q12H    potassium chloride SR (KLOR-CON 10) tablet 20 mEq  20 mEq Oral DAILY    albuterol-ipratropium (DUO-NEB) 2.5 MG-0.5 MG/3 ML  3 mL Nebulization Q6HWA RT            Lab/Data Review:  Labs: Results:       Chemistry Recent Labs     03/01/21 0525 02/28/21 0107 02/27/21 0012   * 96 115*    143 144   K 4.6 3.5 3.8    110 110   CO2 28 28 30   BUN 18 18 21*   CREA 0.88 0.90 0.93   BUCR 20 20 23*   AGAP 5 5 4   CA 8.4* 8.2* 8.7     No results for input(s): TBIL, ALT, ALKP, TP, ALB, GLOB, AGRAT in the last 72 hours. No lab exists for component: SGOT   CBC w/Diff Recent Labs     03/01/21 0525 02/28/21 0107 02/27/21 0012   WBC 12.7 8.3 10.2   RBC 4.14* 3.61* 3.82*   HGB 11.7* 10.5* 10.7*   HCT 37.3 32.4* 34.0*   MCV 90.1 89.8 89.0   MCH 28.3 29.1 28.0   MCHC 31.4 32.4 31.5   RDW 14.5 14.5 14.4    234 251   GRANS  --  72 85*   LYMPH  --  23 12*   EOS  --  0 0      Coagulation No results for input(s): PTP, INR, APTT, INREXT, INREXT in the last 72 hours.     Iron/Ferritin Lab Results   Component Value Date/Time    Iron 32 (L) 02/26/2021 12:37 AM    TIBC 260 02/26/2021 12:37 AM    Iron % saturation 12 (L) 02/26/2021 12:37 AM    Ferritin 41 09/09/2020 05:09 AM       BNP    Cardiac Enzymes Lab Results   Component Value Date/Time    CK 56 02/24/2021 10:37 AM    CK - MB 2.2 02/24/2021 10:37 AM    CK-MB Index 3.9 02/24/2021 10:37 AM    Troponin-I <0.015 12/07/2015 05:23 PM    Troponin-I, QT <0.02 02/24/2021 10:37 AM    BNP 19.9 12/07/2015 05:23 PM        Lactic Acid    Thyroid Studies          All Micro Results     Procedure Component Value Units Date/Time COVID-19 RAPID TEST [[id number]] Collected: 02/26/21 2906    Order Status: Completed Specimen: Nasopharyngeal Updated: 02/26/21 0436     Specimen source Nasopharyngeal        COVID-19 rapid test Not detected        Comment: Rapid Abbott ID Now       Rapid NAAT:  The specimen is NEGATIVE for SARS-CoV-2, the novel coronavirus associated with COVID-19. Negative results should be treated as presumptive and, if inconsistent with clinical signs and symptoms or necessary for patient management, should be tested with an alternative molecular assay. Negative results do not preclude SARS-CoV-2 infection and should not be used as the sole basis for patient management decisions. This test has been authorized by the FDA under an Emergency Use Authorization (EUA) for use by authorized laboratories. Fact sheet for Healthcare Providers: ConventionUpdate.co.nz  Fact sheet for Patients: ConventionUpdate.co.nz       Methodology: Isothermal Nucleic Acid Amplification                 Images:    CT (Most Recent). XRAY (Most Recent)      EKG No results found for this or any previous visit.      2D ECHO

## 2021-03-01 NOTE — PROGRESS NOTES
Bedside shift change report given to Conrado Merlos (oncoming nurse) by Arsh Chase (offgoing nurse). Report included the following information SBAR, Kardex, Intake/Output, MAR and Recent Results.

## 2021-03-01 NOTE — DISCHARGE INSTRUCTIONS
Patient Education        COPD Exacerbation Plan: Care Instructions  Your Care Instructions     If you have chronic obstructive pulmonary disease (COPD), your usual shortness of breath could suddenly get worse. You may start coughing more and have more mucus. This flare-up is called a COPD exacerbation (say \"yj-CJN-et-BAY-ham\"). A lung infection or air pollution could set off an exacerbation. Sometimes it can happen after a quick change in temperature or being around chemicals. Work with your doctor to make a plan for dealing with an exacerbation. You can better manage it if you plan ahead. Follow-up care is a key part of your treatment and safety. Be sure to make and go to all appointments, and call your doctor if you are having problems. It's also a good idea to know your test results and keep a list of the medicines you take. How can you care for yourself at home? During an exacerbation  · Do not panic if you start to have one. Quick treatment at home may help you prevent serious breathing problems. If you have a COPD exacerbation plan that you developed with your doctor, follow it. · Take your medicines exactly as your doctor tells you.  ? Use your inhaler as directed by your doctor. If your symptoms do not get better after you use your medicine, have someone take you to the emergency room. Call an ambulance if necessary. ? With inhaled medicines, a spacer or a nebulizer may help you get more medicine to your lungs. Ask your doctor or pharmacist how to use them properly. Practice using the spacer in front of a mirror before you have an exacerbation. This may help you get the medicine into your lungs quickly. ? If your doctor has given you steroid pills, take them as directed. ? Your doctor may have given you a prescription for antibiotics, which you can fill if you need it. ? Talk to your doctor if you have any problems with your medicine.  And call your doctor if you have to use your antibiotic or steroid pills. Preventing an exacerbation  · Do not smoke. This is the most important step you can take to prevent more damage to your lungs and prevent problems. If you already smoke, it is never too late to stop. If you need help quitting, talk to your doctor about stop-smoking programs and medicines. These can increase your chances of quitting for good. · Take your daily medicines as prescribed. · Avoid colds and flu. ? Get a pneumococcal vaccine. ? Get a flu vaccine each year, as soon as it is available. Ask those you live or work with to do the same, so they will not get the flu and infect you. ? Try to stay away from people with colds or the flu. ? Wash your hands often. · Avoid secondhand smoke; air pollution; cold, dry air; hot, humid air; and high altitudes. Stay at home with your windows closed when air pollution is bad. · Learn breathing techniques for COPD, such as breathing through pursed lips. These techniques can help you breathe easier during an exacerbation. When should you call for help? Call 911 anytime you think you may need emergency care. For example, call if:    · You have severe trouble breathing.     · You have severe chest pain. Call your doctor now or seek immediate medical care if:    · You have new or worse shortness of breath.     · You develop new chest pain.     · You are coughing more deeply or more often, especially if you notice more mucus or a change in the color of your mucus.     · You cough up blood.     · You have new or increased swelling in your legs or belly.     · You have a fever. Watch closely for changes in your health, and be sure to contact your doctor if:    · You need to use your antibiotic or steroid pills.     · Your symptoms are getting worse. Where can you learn more? Go to http://www.gray.com/  Enter U536 in the search box to learn more about \"COPD Exacerbation Plan: Care Instructions. \"  Current as of: February 24, 2020               Content Version: 12.6  © 4628-1939 [employer]. Care instructions adapted under license by [employer] (which disclaims liability or warranty for this information). If you have questions about a medical condition or this instruction, always ask your healthcare professional. [address] any warranty or liability for your use of this information. Patient Education        Breathing Techniques for COPD: Care Instructions  Your Care Instructions     Breathing is hard when you have chronic obstructive pulmonary disease (COPD). You may take quick, short breaths. Breathing this way makes it harder to get air into your lungs. Learning new ways to control your breathing may help. You may feel better and be able to do more. You can try three basic ways to control your breathing. They are pursed-lip breathing, diaphragmatic breathing, and breathing while bending. Use these methods when you are more short of breath than normal. Practice them often so you can do them well. Follow-up care is a key part of your treatment and safety. Be sure to make and go to all appointments, and call your doctor if you are having problems. It's also a good idea to know your test results and keep a list of the medicines you take. How can you care for yourself at home? · Pursed-lip breathing helps you breathe more air out so that your next breath can be deeper. For this type of breathing, you breathe in through your nose and out through your mouth while almost closing your lips. Breathe in for about 2 seconds, and breathe out for 4 to 6 seconds. Pursed-lip breathing decreases shortness of breath and improves your ability to exercise. · Diaphragmatic breathing helps your lungs expand so that they take in more air. ? Lie on your back, or prop yourself up on several pillows.   ? Put one hand on your belly and the other on your chest. When you breathe in, push your belly out as far as possible. You should feel the hand on your belly move out, while the hand on your chest does not move. ? When you breathe out, you should feel the hand on your belly move in. When you can do this type of breathing well while lying down, learn to do it while sitting or standing. Many people with COPD find this breathing method helpful. ? Practice diaphragmatic breathing for 20 minutes, 2 or 3 times a day. · Breathing while bending forward at the waist may make breathing easier. It can reduce shortness of breath while you exercise or rest. It helps the diaphragm move more easily. The diaphragm is a large muscle that separates your lungs from your belly. It helps draw air into your lungs as you breathe. When should you call for help? Call your doctor now or seek immediate medical care if:    · Your breathing methods do not help.     · Your shortness of breath gets worse.     · You cough up blood.     · You have swelling in your belly and legs.     · You have severe chest pain. Watch closely for changes in your health, and be sure to contact your doctor if you have any problems. Where can you learn more? Go to http://www.gray.com/  Enter [id number] in the search box to learn more about \"Breathing Techniques for COPD: Care Instructions. \"  Current as of: February 24, 2020               Content Version: 12.6  © 5084-1621 [employer]. Care instructions adapted under license by [employer] (which disclaims liability or warranty for this information). If you have questions about a medical condition or this instruction, always ask your healthcare professional. [place] [address] any warranty or liability for your use of this information. Patient Education        Cellulitis: Care Instructions  Your Care Instructions     Cellulitis is a skin infection caused by bacteria, most often strep or staph.  It often occurs after a break in the skin from a scrape, cut, bite, or puncture, or after a rash. Cellulitis may be treated without doing tests to find out what caused it. But your doctor may do tests, if needed, to look for a specific bacteria, like methicillin-resistant Staphylococcus aureus (MRSA). The doctor has checked you carefully, but problems can develop later. If you notice any problems or new symptoms, get medical treatment right away. Follow-up care is a key part of your treatment and safety. Be sure to make and go to all appointments, and call your doctor if you are having problems. It's also a good idea to know your test results and keep a list of the medicines you take. How can you care for yourself at home? · Take your antibiotics as directed. Do not stop taking them just because you feel better. You need to take the full course of antibiotics. · Prop up the infected area on pillows to reduce pain and swelling. Try to keep the area above the level of your heart as often as you can. · If your doctor told you how to care for your wound, follow your doctor's instructions. If you did not get instructions, follow this general advice:  ? Wash the wound with clean water 2 times a day. Don't use hydrogen peroxide or alcohol, which can slow healing. ? You may cover the wound with a thin layer of petroleum jelly, such as Vaseline, and a nonstick bandage. ? Apply more petroleum jelly and replace the bandage as needed. · Be safe with medicines. Take pain medicines exactly as directed. ? If the doctor gave you a prescription medicine for pain, take it as prescribed. ? If you are not taking a prescription pain medicine, ask your doctor if you can take an over-the-counter medicine. To prevent cellulitis in the future  · Try to prevent cuts, scrapes, or other injuries to your skin. Cellulitis most often occurs where there is a break in the skin.   · If you get a scrape, cut, mild burn, or bite, wash the wound with clean water as soon as you can to help avoid infection. Don't use hydrogen peroxide or alcohol, which can slow healing. · If you have swelling in your legs (edema), support stockings and good skin care may help prevent leg sores and cellulitis. · Take care of your feet, especially if you have diabetes or other conditions that increase the risk of infection. Wear shoes and socks. Do not go barefoot. If you have athlete's foot or other skin problems on your feet, talk to your doctor about how to treat them. When should you call for help? Call your doctor now or seek immediate medical care if:    · You have signs that your infection is getting worse, such as:  ? Increased pain, swelling, warmth, or redness. ? Red streaks leading from the area. ? Pus draining from the area. ? A fever.     · You get a rash. Watch closely for changes in your health, and be sure to contact your doctor if:    · You do not get better as expected. Where can you learn more? Go to http://www.gray.com/  Enter X309 in the search box to learn more about \"Cellulitis: Care Instructions. \"  Current as of: July 2, 2020               Content Version: 12.6  © 5121-5221 [employer]. Care instructions adapted under license by [employer] (which disclaims liability or warranty for this information). If you have questions about a medical condition or this instruction, always ask your healthcare professional. [place] [address] any warranty or liability for your use of this information.          DISCHARGE SUMMARY from Nurse    PATIENT INSTRUCTIONS:    After general anesthesia or intravenous sedation, for 24 hours or while taking prescription Narcotics:  · Limit your activities  · Do not drive and operate hazardous machinery  · Do not make important personal or business decisions  · Do  not drink alcoholic beverages  · If you have not urinated within 8 hours after discharge, please contact your surgeon on call.    Report the following to your surgeon:  · Excessive pain, swelling, redness or odor of or around the surgical area  · Temperature over 100.5  · Nausea and vomiting lasting longer than 4 hours or if unable to take medications  · Any signs of decreased circulation or nerve impairment to extremity: change in color, persistent  numbness, tingling, coldness or increase pain  · Any questions    What to do at Home:  Recommended activity: Activity as tolerated,     If you experience any of the following symptoms shortness of breath not relieved by medications, leg cellulitis worsens, please follow up with primary care doctor. *  Please give a list of your current medications to your Primary Care Provider. *  Please update this list whenever your medications are discontinued, doses are      changed, or new medications (including over-the-counter products) are added. *  Please carry medication information at all times in case of emergency situations. These are general instructions for a healthy lifestyle:    No smoking/ No tobacco products/ Avoid exposure to second hand smoke  Surgeon General's Warning:  Quitting smoking now greatly reduces serious risk to your health. Obesity, smoking, and sedentary lifestyle greatly increases your risk for illness    A healthy diet, regular physical exercise & weight monitoring are important for maintaining a healthy lifestyle    You may be retaining fluid if you have a history of heart failure or if you experience any of the following symptoms:  Weight gain of 3 pounds or more overnight or 5 pounds in a week, increased swelling in our hands or feet or shortness of breath while lying flat in bed. Please call your doctor as soon as you notice any of these symptoms; do not wait until your next office visit. The discharge information has been reviewed with the patient. The patient verbalized understanding.   Discharge medications reviewed with the patient and appropriate educational materials and side effects teaching were provided.   ___________________________________________________________________________________________________________________________________

## 2021-03-01 NOTE — PROGRESS NOTES
Pt not seen for skilled PT due to:    [ ]  Nausea/vomiting  [ ]  Eating  [ ]  Pain  [ ]  Pt lethargic  [ ]  Off Unit  Other: 10:17- pt refused. 12:06- pt discharged    Will f/u later as schedule allows. Thank you.   Gwendolyn Beltre, PT, DPT

## 2021-03-01 NOTE — DISCHARGE SUMMARY
Physician Discharge Summary       Patient: Zhane Walter MRN: [id number]  SSN: [id number]    YOB: 1965  Age: 63 y.o. Sex: female    PCP: Mirella Mcbride MD    Allergies: Patient has no known allergies. Admit date: 2/26/2021  Admitting Provider: Stacey Calderon MD    Discharge date: 3/1/2021  Discharging Provider: Meagan Del Valle MD    * Admission Diagnoses: Bilateral lower leg cellulitis [L03.116, I04.278]  COPD with acute exacerbation ([address] [J44.1]  Hypokalemia [E87.6]  COPD exacerbation ([address] [J44.1]    * Discharge Diagnoses:      1. Bilateral leg pain, improving. No DVT. 2.  Left leg cellulitis, clinically much better  3. Acute COPD/Asthma exacerbation, negative covid, much better  4. Chronic respiratory failure with hypoxia, stable  5. H/o tobacco dependence   6. Hypertensive urgency, resolved   7. History of diastolic CHF, compensated. 8.  Dyslipidemia   9. Mild renal insufficiency, improved. 10.  Hypokalemia, better  11. Iron deficiency anemia   12. Reportedly h/o prolonged heroin abuse     * Hospital Course: The patient presented to the hospital on February 26, 2021 with a complaint of bilateral lower extremity pain and worsening shortness of breath. Please refer hospital admission H&P for further detail. She had chest x-ray done on February 24, 2021 and it only showed minimal residual pleural parenchymal opacities. Patient also had bilateral lower extremity venous Doppler that just showed no DVT. Patient was admitted here with a diagnosis of COPD exacerbation as well as left leg cellulitis. Patient was initially started on ceftriaxone but was changed to IV vancomycin after ID consult. Patient had one set of blood culture remain negative and that was done also on February 24, 2021. She had a rapid Covid test that was negative. When I saw her on March 1, 2021, she was walking in hallway without any issues.   There is no redness or increased warmth on the left leg as well as right leg other than some scratches over the calves. She was also started on clonidine and losartan for hypertension management. She was also started on IV steroid for COPD exacerbation. She was continued on oxygen and nebulizer treatment. The plan is to discharge her home on clindamycin, doxycycline for 1 week with 10 days of probiotic. She does not have any fever or chills. She will be discharged on tapering dose of steroid and was advised to continue home dose of inhalers/nebulizer. She verbalized understanding about it. She will be also given Percocet for leg pain as she was using morphine here. Patient was continue home medication for the comorbidities. * Procedures: None  * No surgery found *      Consults: ID    Significant Diagnostic Studies: Chest x-ray: Bilateral lower extremity venous Doppler and blood culture on February 24, 2021    Discharge Exam:  Please read my progress note from March 1, 2021 for further detail    * Discharge Condition: improved  * Disposition: Home    Discharge Medications:  Current Discharge Medication List      START taking these medications    Details   Lactobacillus Acidoph & Bulgar ([place]) 1 million cell tab tablet Take 2 Tabs by mouth two (2) times a day for 10 days. Qty: 40 Tab, Refills: 0      doxycycline (MONODOX) 100 mg capsule Take 1 Cap by mouth every twelve (12) hours for 7 days. Qty: 14 Cap, Refills: 0      cloNIDine HCL (CATAPRES) 0.2 mg tablet Take 1 Tab by mouth every twelve (12) hours. Qty: 60 Tab, Refills: 0      famotidine (PEPCID) 20 mg tablet Take 1 Tab by mouth nightly. Qty: 10 Tab, Refills: 0      losartan (COZAAR) 25 mg tablet Take 1 Tab by mouth daily.  Indications: high blood pressure  Qty: 30 Tab, Refills: 0      OTHER BMP on March 5, 2021 and please call patient's primary care physicians with the report  Reason: Hypertension and cellulitis  Qty: 1 Each, Refills: 0      oxyCODONE-acetaminophen (Percocet) 7.5-325 mg per tablet Take 1 Tab by mouth every eight (8) hours as needed for Pain for up to 7 days. Max Daily Amount: 3 Tabs. Qty: 21 Tab, Refills: 0    Associated Diagnoses: Bilateral lower leg cellulitis         CONTINUE these medications which have CHANGED    Details   clindamycin (CLEOCIN) 300 mg capsule Take 1 Cap by mouth four (4) times daily for 7 days. Qty: 28 Cap, Refills: 0      predniSONE (DELTASONE) 10 mg tablet 4 tablets p.o. daily for 3 days then 3 tablets daily for 5 days then 2 tablets p.o. daily for 5 days then 1 tablet p.o. daily  Qty: 60 Tab, Refills: 0      albuterol (PROVENTIL VENTOLIN) 2.5 mg /3 mL (0.083 %) nebu 1 nebulizer treatment every 4 hours while awake for 1 week then every 4 hours as needed for shortness of breath  Qty: 60 Each, Refills: 1         CONTINUE these medications which have NOT CHANGED    Details   fluticasone furoate-vilanteroL (Breo Ellipta) 200-25 mcg/dose inhaler Take 1 Puff by inhalation daily. Indications: controller medication for asthma  Qty: 1 Inhaler, Refills: 3      roflumilast (DALIRESP) 500 mcg tab tablet Take 1 Tab by mouth daily. Qty: 30 Tab, Refills: 3      citalopram (CeleXA) 20 mg tablet Take 1 Tab by mouth every morning. Qty: 30 Tab, Refills: 0      OXYGEN-AIR DELIVERY SYSTEMS 3 L by IntraNASal route as needed for Other (sob). atorvastatin (LIPITOR) 20 mg tablet Take 1 Tab by mouth nightly. Qty: 30 Tab, Refills: 0      Nebulizer & Compressor machine 1 Each by Does Not Apply route as needed for Wheezing or Shortness of Breath. Qty: 1 Each, Refills: 0      nitroglycerin (NITROSTAT) 0.4 mg SL tablet Take 1 Tab by mouth every five (5) minutes as needed for Chest Pain. Sit/Lay down then put one tab under the tongue every 5 minutes as needed for chest pain for 3 doses  Qty: 1 Bottle, Refills: 0      aspirin delayed-release 81 mg tablet Take 1 Tab by mouth daily. Qty: 30 Tab, Refills: 0      montelukast (SINGULAIR) 10 mg tablet Take 1 Tab by mouth nightly. Indications: controller medication for asthma  Qty: 30 Tab, Refills: 1      naloxone (NARCAN) 4 mg/actuation nasal spray Use 1 spray intranasally, then discard. Repeat with new spray every 2 min as needed for opioid overdose symptoms, alternating nostrils. Qty: 1 Each, Refills: 0         STOP taking these medications       ALPRAZolam (Xanax) 1 mg tablet Comments:   Reason for Stopping:         doxycycline (VIBRA-TABS) 100 mg tablet Comments:   Reason for Stopping:         furosemide (Lasix) 40 mg tablet Comments:   Reason for Stopping:               * Follow-up Care/Patient Instructions: Activity: Activity as tolerated  Diet: Cardiac Diet and Diabetic Diet  Wound Care: None needed    Follow-up Information     Follow up With Specialties Details Why Contact Info    Sayra Laws NP Nurse Practitioner Go on 3/4/2021 Follow up appointment @ 11:15 am [address] [place] [address]      Cameron Bonilla MD Pulmonary Disease, Urgent Care, Internal Medicine   [address] [place] [address]  [phone]          Follow-up Appointments   Procedures    FOLLOW UP VISIT Appointment in: Other (Specify) With primary care in 5 days With Dr. Clau Diaz and group in 2 weeks     With primary care in 5 days  With Dr. Clau Diaz and group in 2 weeks     Standing Status:   Standing     Number of Occurrences:   1     Order Specific Question:   Appointment in     Answer: Other (Specify)     Total time to take care of this patient was 35 minutes and more than 50% of time was spent counseling and coordinating care. Disclaimer: Sections of this note are dictated using utilizing voice recognition software, which may have resulted in some phonetic based errors in grammar and contents. Even though attempts were made to correct all the mistakes, some may have been missed, and remained in the body of the document. If questions arise, please contact our department.       Signed:  Dee Dee Cuba MD  3/1/2021  10:21 AM

## 2021-03-01 NOTE — PROGRESS NOTES
Problem: Falls - Risk of  Goal: *Absence of Falls  Description: Document Tyshawn Rodriguez Fall Risk and appropriate interventions in the flowsheet.   Outcome: Progressing Towards Goal  Note: Fall Risk Interventions:  Mobility Interventions: Bed/chair exit alarm         Medication Interventions: Evaluate medications/consider consulting pharmacy, Patient to call before getting OOB, Teach patient to arise slowly    Elimination Interventions: Call light in reach              Problem: Patient Education: Go to Patient Education Activity  Goal: Patient/Family Education  Outcome: Progressing Towards Goal     Problem: Patient Education: Go to Patient Education Activity  Goal: Patient/Family Education  Outcome: Progressing Towards Goal     Problem: Patient Education: Go to Patient Education Activity  Goal: Patient/Family Education  Outcome: Progressing Towards Goal     Problem: Pain  Goal: *Control of Pain  Outcome: Progressing Towards Goal  Goal: *PALLIATIVE CARE:  Alleviation of Pain  Outcome: Progressing Towards Goal     Problem: Patient Education: Go to Patient Education Activity  Goal: Patient/Family Education  Outcome: Progressing Towards Goal     Problem: General Medical Care Plan  Goal: *Vital signs within specified parameters  Outcome: Progressing Towards Goal  Goal: *Labs within defined limits  Outcome: Progressing Towards Goal  Goal: *Absence of infection signs and symptoms  Outcome: Progressing Towards Goal  Goal: *Optimal pain control at patient's stated goal  Outcome: Progressing Towards Goal  Goal: *Skin integrity maintained  Outcome: Progressing Towards Goal  Goal: *Fluid volume balance  Outcome: Progressing Towards Goal  Goal: *Optimize nutritional status  Outcome: Progressing Towards Goal  Goal: *Anxiety reduced or absent  Outcome: Progressing Towards Goal  Goal: *Progressive mobility and function (eg: ADL's)  Outcome: Progressing Towards Goal     Problem: Patient Education: Go to Patient Education Activity  Goal: Patient/Family Education  Outcome: Progressing Towards Goal

## 2021-03-02 LAB
BACTERIA SPEC CULT: NORMAL
SERVICE CMNT-IMP: NORMAL

## 2021-03-05 ENCOUNTER — HOSPITAL ENCOUNTER (EMERGENCY)
Age: 56
Discharge: LEFT BEFORE DISCHARGE | End: 2021-03-05
Attending: EMERGENCY MEDICINE
Payer: MEDICAID

## 2021-03-05 ENCOUNTER — APPOINTMENT (OUTPATIENT)
Dept: GENERAL RADIOLOGY | Age: 56
End: 2021-03-05
Attending: EMERGENCY MEDICINE
Payer: MEDICAID

## 2021-03-05 VITALS
DIASTOLIC BLOOD PRESSURE: 85 MMHG | OXYGEN SATURATION: 100 % | HEART RATE: 83 BPM | TEMPERATURE: 98.9 F | RESPIRATION RATE: 18 BRPM | SYSTOLIC BLOOD PRESSURE: 165 MMHG

## 2021-03-05 DIAGNOSIS — R60.0 BILATERAL LOWER EXTREMITY EDEMA: ICD-10-CM

## 2021-03-05 DIAGNOSIS — J44.1 ACUTE EXACERBATION OF CHRONIC OBSTRUCTIVE PULMONARY DISEASE (COPD) (HCC): Primary | ICD-10-CM

## 2021-03-05 DIAGNOSIS — R06.03 RESPIRATORY DISTRESS: ICD-10-CM

## 2021-03-05 LAB
ALBUMIN SERPL-MCNC: 3.3 G/DL (ref 3.4–5)
ALBUMIN/GLOB SERPL: 0.9 {RATIO} (ref 0.8–1.7)
ALP SERPL-CCNC: 88 U/L (ref 45–117)
ALT SERPL-CCNC: 21 U/L (ref 13–56)
ANION GAP SERPL CALC-SCNC: 3 MMOL/L (ref 3–18)
AST SERPL-CCNC: 12 U/L (ref 10–38)
ATRIAL RATE: 78 BPM
BILIRUB SERPL-MCNC: 0.5 MG/DL (ref 0.2–1)
BNP SERPL-MCNC: 1591 PG/ML (ref 0–900)
BUN SERPL-MCNC: 19 MG/DL (ref 7–18)
BUN/CREAT SERPL: 25 (ref 12–20)
CALCIUM SERPL-MCNC: 9 MG/DL (ref 8.5–10.1)
CALCULATED P AXIS, ECG09: 53 DEGREES
CALCULATED R AXIS, ECG10: 61 DEGREES
CALCULATED T AXIS, ECG11: 21 DEGREES
CHLORIDE SERPL-SCNC: 108 MMOL/L (ref 100–111)
CK MB CFR SERPL CALC: 5.3 % (ref 0–4)
CK MB SERPL-MCNC: 2.9 NG/ML (ref 5–25)
CK SERPL-CCNC: 55 U/L (ref 26–192)
CO2 SERPL-SCNC: 34 MMOL/L (ref 21–32)
CREAT SERPL-MCNC: 0.75 MG/DL (ref 0.6–1.3)
DIAGNOSIS, 93000: NORMAL
GLOBULIN SER CALC-MCNC: 3.5 G/DL (ref 2–4)
GLUCOSE SERPL-MCNC: 111 MG/DL (ref 74–99)
P-R INTERVAL, ECG05: 134 MS
POTASSIUM SERPL-SCNC: 3.9 MMOL/L (ref 3.5–5.5)
PROT SERPL-MCNC: 6.8 G/DL (ref 6.4–8.2)
Q-T INTERVAL, ECG07: 406 MS
QRS DURATION, ECG06: 90 MS
QTC CALCULATION (BEZET), ECG08: 462 MS
SODIUM SERPL-SCNC: 145 MMOL/L (ref 136–145)
TROPONIN I SERPL-MCNC: <0.02 NG/ML (ref 0–0.04)
VENTRICULAR RATE, ECG03: 78 BPM

## 2021-03-05 PROCEDURE — 80053 COMPREHEN METABOLIC PANEL: CPT

## 2021-03-05 PROCEDURE — 83880 ASSAY OF NATRIURETIC PEPTIDE: CPT

## 2021-03-05 PROCEDURE — 74011000250 HC RX REV CODE- 250: Performed by: EMERGENCY MEDICINE

## 2021-03-05 PROCEDURE — 96374 THER/PROPH/DIAG INJ IV PUSH: CPT

## 2021-03-05 PROCEDURE — 99283 EMERGENCY DEPT VISIT LOW MDM: CPT

## 2021-03-05 PROCEDURE — 82553 CREATINE MB FRACTION: CPT

## 2021-03-05 PROCEDURE — 71045 X-RAY EXAM CHEST 1 VIEW: CPT

## 2021-03-05 PROCEDURE — 74011250636 HC RX REV CODE- 250/636: Performed by: EMERGENCY MEDICINE

## 2021-03-05 PROCEDURE — 94640 AIRWAY INHALATION TREATMENT: CPT

## 2021-03-05 PROCEDURE — 93005 ELECTROCARDIOGRAM TRACING: CPT

## 2021-03-05 RX ORDER — IPRATROPIUM BROMIDE AND ALBUTEROL SULFATE 2.5; .5 MG/3ML; MG/3ML
3 SOLUTION RESPIRATORY (INHALATION) ONCE
Status: COMPLETED | OUTPATIENT
Start: 2021-03-05 | End: 2021-03-05

## 2021-03-05 RX ADMIN — IPRATROPIUM BROMIDE AND ALBUTEROL SULFATE 3 ML: .5; 3 SOLUTION RESPIRATORY (INHALATION) at 12:05

## 2021-03-05 RX ADMIN — METHYLPREDNISOLONE SODIUM SUCCINATE 125 MG: 125 INJECTION, POWDER, FOR SOLUTION INTRAMUSCULAR; INTRAVENOUS at 12:05

## 2021-03-05 NOTE — ED TRIAGE NOTES
Pt presents to ED via triage with worsening leg pain 10/10 and SOB over last week. PT is A/Ox4, in [facility] to triage with care giver. Pt is on personal O2 condenser and states she feels like she cant get enough air on 5L. PMH of CHF and COPD.

## 2021-03-05 NOTE — ED PROVIDER NOTES
[employer]  [facility] EMERGENCY DEPT      11:51 AM    Date: 3/5/2021  Patient Name: Tyrone Jane    History of Presenting Illness     Chief Complaint   Patient presents with    Leg Pain    Shortness of Breath       63 y.o. female with noted past medical history who presents to the emergency department with bilateral lower extremity edema and shortness of breath. Patient states she was recent admitted to the hospital for cellulitis of hypokalemia and that her legs are getting better as far as her redness and pain. However she has noticed that over the last 6 days she has had increased bilateral lower extremity edema as well as shortness of breath and dyspnea on exertion. She also notes increased wheezing that is typical of her COPD exacerbations. The patient denies recent travel outside [place] and denies recent travel to areas large social gatherings. The patient denies any known history of Covid 19 exposure. Patient states she been tested 8 times in the past for Covid and each time she has been negative. Patient denies any other associated signs or symptoms. Patient denies any other complaints. Nursing notes regarding the HPI and triage nursing notes were reviewed. Prior medical records were reviewed. Current Outpatient Medications   Medication Sig Dispense Refill    clindamycin (CLEOCIN) 300 mg capsule Take 1 Cap by mouth four (4) times daily for 7 days. 28 Cap 0    predniSONE (DELTASONE) 10 mg tablet 4 tablets p.o. daily for 3 days then 3 tablets daily for 5 days then 2 tablets p.o. daily for 5 days then 1 tablet p.o. daily 60 Tab 0    albuterol (PROVENTIL VENTOLIN) 2.5 mg /3 mL (0.083 %) nebu 1 nebulizer treatment every 4 hours while awake for 1 week then every 4 hours as needed for shortness of breath 60 Each 1    Lactobacillus Acidoph & Bulgar (FLORANEX) 1 million cell tab tablet Take 2 Tabs by mouth two (2) times a day for 10 days.  40 Tab 0    doxycycline (MONODOX) 100 mg capsule Take 1 Cap by mouth every twelve (12) hours for 7 days. 14 Cap 0    cloNIDine HCL (CATAPRES) 0.2 mg tablet Take 1 Tab by mouth every twelve (12) hours. 60 Tab 0    famotidine (PEPCID) 20 mg tablet Take 1 Tab by mouth nightly. 10 Tab 0    losartan (COZAAR) 25 mg tablet Take 1 Tab by mouth daily. Indications: high blood pressure 30 Tab 0    OTHER BMP on March 5, 2021 and please call patient's primary care physicians with the report  Reason: Hypertension and cellulitis 1 Each 0    oxyCODONE-acetaminophen (Percocet) 7.5-325 mg per tablet Take 1 Tab by mouth every eight (8) hours as needed for Pain for up to 7 days. Max Daily Amount: 3 Tabs. 21 Tab 0    Nebulizer & Compressor machine 1 Each by Does Not Apply route as needed for Wheezing or Shortness of Breath. 1 Each 0    fluticasone furoate-vilanteroL (Breo Ellipta) 200-25 mcg/dose inhaler Take 1 Puff by inhalation daily. Indications: controller medication for asthma 1 Inhaler 3    nitroglycerin (NITROSTAT) 0.4 mg SL tablet Take 1 Tab by mouth every five (5) minutes as needed for Chest Pain. Sit/Lay down then put one tab under the tongue every 5 minutes as needed for chest pain for 3 doses 1 Bottle 0    aspirin delayed-release 81 mg tablet Take 1 Tab by mouth daily. 30 Tab 0    montelukast (SINGULAIR) 10 mg tablet Take 1 Tab by mouth nightly. Indications: controller medication for asthma 30 Tab 1    roflumilast (DALIRESP) 500 mcg tab tablet Take 1 Tab by mouth daily. 30 Tab 3    citalopram (CeleXA) 20 mg tablet Take 1 Tab by mouth every morning. 30 Tab 0    OXYGEN-AIR DELIVERY SYSTEMS 3 L by IntraNASal route as needed for Other (sob).  atorvastatin (LIPITOR) 20 mg tablet Take 1 Tab by mouth nightly. 30 Tab 0    naloxone (NARCAN) 4 mg/actuation nasal spray Use 1 spray intranasally, then discard. Repeat with new spray every 2 min as needed for opioid overdose symptoms, alternating nostrils.  1 Each 0       Past History     Past Medical History:  Past Medical History:   Diagnosis Date    CHF (congestive heart failure) (HCC)     Chronic respiratory failure with hypoxia ([address] 9/7/2020    CKD (chronic kidney disease) stage 3, GFR 30-59 ml/min 10/31/2019    Cocaine abuse ([address] 11/26/2017    COPD (chronic obstructive pulmonary disease) with chronic bronchitis ([address] 12/19/2017    Dependence on supplemental oxygen     Depression 3/22/2020    Drug-seeking behavior 11/19/2016    Endocrine disease     thyroid issues    Essential hypertension 3/10/2017    Fe deficiency anemia 10/27/2012    Fibromyalgia 12/16/2016    Gastroesophageal reflux disease without esophagitis 10/10/2016    Gastrointestinal disorder     \"blockage in my stomach\"    Hypercholesterolemia     Hypertension     Hypertensive heart failure ([address] 12/19/2017    Morbid obesity due to excess calories ([address] 3/10/2017    NSTEMI (non-ST elevated myocardial infarction) ([address] 3/22/2020    On home O2 12/3/2015    Overview:  2L at home per pt for approx 8 years    Polysubstance abuse ([address] 9/15/2018    Respiratory failure ([address] 1/11/2017    S/P hernia repair 10/31/2019    Sleep apnea 12/19/2017    T wave inversion in EKG 3/9/2019    Tobacco use 1/15/6278    Uncomplicated severe persistent asthma 12/13/2016    Vocal cord disease 12/7/2016       Past Surgical History:  Past Surgical History:   Procedure Laterality Date    HX GI      Exploratory laparotomy with lysis of adhesions and primary repair of incarcerated umbilical hernia       Family History:  No family history on file.     Social History:  Social History     Tobacco Use    Smoking status: Former Smoker     Packs/day: 0.25    Smokeless tobacco: Current User    Tobacco comment: Pt states she has not had money to buy cigarettes in the past month   Substance Use Topics    Alcohol use: No     Comment: socially    Drug use: Not Currently     Types: Heroin     Comment: pt reports using approximately a month ago Allergies:  No Known Allergies    Patient's primary care provider (as noted in EPIC):  Rosa Dixon MD    Review of Systems   Constitutional: Negative for diaphoresis. HENT: Negative for congestion. Eyes: Negative for discharge. Respiratory: Positive for shortness of breath and wheezing. Negative for stridor. Cardiovascular: Positive for leg swelling. Negative for chest pain and palpitations. Gastrointestinal: Negative for diarrhea. Endocrine: Negative for heat intolerance. Genitourinary: Negative for flank pain. Musculoskeletal: Negative for back pain. Neurological: Negative for weakness. Psychiatric/Behavioral: Negative for hallucinations. All other systems reviewed and are negative. Visit Vitals  BP (!) 165/85   Pulse 83   Temp 98.9 °F (37.2 °C)   Resp 18   SpO2 100%       PHYSICAL EXAM:    CONSTITUTIONAL:  Alert, in no apparent distress;  well developed;  well nourished. HEAD:  Normocephalic, atraumatic. EYES:  EOMI. Non-icteric sclera. Normal conjunctiva. ENTM:  Nose:  no rhinorrhea. Throat:  no erythema or exudate, mucous membranes moist.  NECK:  No JVD. Supple    RESPIRATORY:  Diffuse whole expiratory wheeze with good air movement. CARDIOVASCULAR:  Regular rate and rhythm. No murmurs, rubs, or gallops. GI:  Normal bowel sounds, abdomen soft and non-tender. No rebound or guarding. BACK:  Non-tender. UPPER EXT:  Normal inspection. LOWER EXT:  No edema, no calf tenderness. Distal pulses intact. NEURO:  Moves all four extremities, and grossly normal motor exam.  SKIN:  No rashes;  Normal for age. PSYCH:  Alert and normal affect.     DIFFERENTIAL DIAGNOSES/ MEDICAL DECISION MAKING:   Shortness of breath etiologies include chronic obstructive pulmonary disease (COPD), acute asthma exacerbation, congestive heart failure, pneumonia, acute bronchitis, pulmonary embolism, upper respiratory infection, cardiac event to include acute coronary syndrome, acute myocardial infarction or a combination of the above (ex URI on top of COPD thus causing respiratory distress). Diagnostic Study Results     Abnormal lab results from this emergency department encounter:  Labs Reviewed   CARDIAC PANEL,(CK, CKMB & TROPONIN) - Abnormal; Notable for the following components:       Result Value    CK-MB Index 5.3 (*)     All other components within normal limits   METABOLIC PANEL, COMPREHENSIVE - Abnormal; Notable for the following components:    CO2 34 (*)     Glucose 111 (*)     BUN 19 (*)     BUN/Creatinine ratio 25 (*)     Albumin 3.3 (*)     All other components within normal limits   NT-PRO BNP - Abnormal; Notable for the following components:    NT pro-BNP 1,591 (*)     All other components within normal limits   METABOLIC PANEL, BASIC   CBC WITH AUTOMATED DIFF   BLOOD GAS, ARTERIAL       Lab values for this patient within approximately the last 12 hours:  Recent Results (from the past 12 hour(s))   CARDIAC PANEL,(CK, CKMB & TROPONIN)    Collection Time: 03/05/21 11:20 AM   Result Value Ref Range    CK - MB 2.9 <3.6 ng/ml    CK-MB Index 5.3 (H) 0.0 - 4.0 %    CK 55 26 - 192 U/L    Troponin-I, QT <0.02 0.0 - 1.875 NG/ML   METABOLIC PANEL, COMPREHENSIVE    Collection Time: 03/05/21 11:20 AM   Result Value Ref Range    Sodium 145 136 - 145 mmol/L    Potassium 3.9 3.5 - 5.5 mmol/L    Chloride 108 100 - 111 mmol/L    CO2 34 (H) 21 - 32 mmol/L    Anion gap 3 3.0 - 18 mmol/L    Glucose 111 (H) 74 - 99 mg/dL    BUN 19 (H) 7.0 - 18 MG/DL    Creatinine 0.75 0.6 - 1.3 MG/DL    BUN/Creatinine ratio 25 (H) 12 - 20      GFR est AA >60 >60 ml/min/1.73m2    GFR est non-AA >60 >60 ml/min/1.73m2    Calcium 9.0 8.5 - 10.1 MG/DL    Bilirubin, total 0.5 0.2 - 1.0 MG/DL    ALT (SGPT) 21 13 - 56 U/L    AST (SGOT) 12 10 - 38 U/L    Alk.  phosphatase 88 45 - 117 U/L    Protein, total 6.8 6.4 - 8.2 g/dL    Albumin 3.3 (L) 3.4 - 5.0 g/dL    Globulin 3.5 2.0 - 4.0 g/dL    A-G Ratio 0.9 0.8 - 1.7 NT-PRO BNP    Collection Time: 03/05/21 11:20 AM   Result Value Ref Range    NT pro-BNP 1,591 (H) 0 - 900 PG/ML   EKG, 12 LEAD, INITIAL    Collection Time: 03/05/21 11:37 AM   Result Value Ref Range    Ventricular Rate 78 BPM    Atrial Rate 78 BPM    P-R Interval 134 ms    QRS Duration 90 ms    Q-T Interval 406 ms    QTC Calculation (Bezet) 462 ms    Calculated P Axis 53 degrees    Calculated R Axis 61 degrees    Calculated T Axis 21 degrees    Diagnosis       Normal sinus rhythm  Minimal voltage criteria for LVH, may be normal variant  Cannot rule out Anterior infarct (cited on or before 17-FEB-2021)  Abnormal ECG  When compared with ECG of 24-FEB-2021 08:22,  No significant change was found  Confirmed by Kam Andres MD, Keyon Wilson ([id number]) on 3/5/2021 1:25:51 PM         Radiologist and cardiologist interpretations if available at time of this note:  Xr Chest Port    Result Date: 3/5/2021  Portable CXR: HISTORY: Shortness of breath. Comparison 2/24/2021 Mild cardiomegaly and vascular cephalization, stable. Slightly prominent pulmonary arteries, stable. No pulmonary consolidation. No pleural effusion. No pneumothorax. Stable mild vascular congestion. Also suspect pulmonary hypertension. No pulmonary consolidation or pleural effusion. Medication(s) ordered for patient during this emergency visit encounter:  Medications   albuterol-ipratropium (DUO-NEB) 2.5 MG-0.5 MG/3 ML (3 mL Nebulization Given 3/5/21 1205)   albuterol-ipratropium (DUO-NEB) 2.5 MG-0.5 MG/3 ML (3 mL Nebulization Given 3/5/21 1205)   albuterol-ipratropium (DUO-NEB) 2.5 MG-0.5 MG/3 ML (3 mL Nebulization Given 3/5/21 1205)   methylPREDNISolone (PF) (Solu-MEDROL) injection 125 mg (125 mg IntraVENous Given 3/5/21 1205)       Medical Decision Making     I am the first provider for this patient. I reviewed the vital signs, available nursing notes, past medical history, past surgical history, family history and social history.     Vital Signs:  Reviewed the patient's vital signs. ED COURSE:     Critical Care Note:  Respiratory Distress    Critical care minutes: 50 MINUTES. Given patient's initial arrival in respiratory distress, numerous serial reevaluations of the patient's respiratory status and patient's response to various medical interventions. Given the patients underlying condition medical intervention(s) were needed, requiring numerous reevaluations of patient's vital signs and response to different emergency department therapies, total bedside time evaluating and/or treating the patient, not including procedures, is noted below. 2:52 PM  I went to reassess the patient and per nursing staff the patient was swearing for several minutes and then left on her own accord. Patient is alert. IMPRESSION AND MEDICAL DECISION MAKING:  Based upon the patient's presentation with noted HPI and PE, along with the work up done in the emergency department, I believe that the patient is having an acute COPD exacerbation. The patients respiratory status did NOT improve enough during the patients emergency department course with noted HHN treatments, steroids, and other noted modalities and medications for me to feel comfortable with discharge of the patient home. Therefore, will admit the patient for further pulmonary treatments and observation. As the emergency physician, I wrote courtesy admission orders for the accepting physician. The courtesy orders included explicit instructions for the floor nursing staff to call the admitting attending physician upon patient arrival on the floor. Coding Diagnoses     Clinical Impression:   1. Acute exacerbation of chronic obstructive pulmonary disease (COPD) ([facility] [address]    2. Respiratory distress    3. Bilateral lower extremity edema      Disposition     Disposition: Eloped    HEMA Galeas Board Certified Emergency Physician    Provider Attestation:  If a scribe was utilized in generation of this patient record, I personally performed the services described in the documentation, reviewed the documentation, as recorded by the scribe in my presence, and it accurately records the patient's history of presenting illness, review of systems, patient physical examination, and procedures performed by me as the attending physician. Luis Coreas M.D.   DAVON Board Certified Emergency Physician  3/5/2021.  11:51 AM

## 2021-03-05 NOTE — ED NOTES
This nurse went in to medicate patient. Pt cursing and demanding pain medication, given breathing treatment and steroid as ordered per MD. Pt continues to curse in room. Pt has been seen and assessed by MD, completed orders from MD for patient. Pt was placed in room of her choice, as she refused the first room, and given blankets.

## 2021-03-05 NOTE — ED NOTES
Pt continues to become increasingly more agitated and threatening in room and cursing. Pt getting dressed and states she is leaving, refuses to have IV removed by this nurse.  Arturo charge nurse aware of situation and requested to have her remove IV for patient

## 2021-03-08 ENCOUNTER — APPOINTMENT (OUTPATIENT)
Dept: GENERAL RADIOLOGY | Age: 56
DRG: 140 | End: 2021-03-08
Attending: PHYSICIAN ASSISTANT
Payer: MEDICAID

## 2021-03-08 ENCOUNTER — HOSPITAL ENCOUNTER (EMERGENCY)
Age: 56
Discharge: HOME | DRG: 140 | End: 2021-03-08
Attending: EMERGENCY MEDICINE
Payer: MEDICAID

## 2021-03-08 VITALS
HEIGHT: 59 IN | OXYGEN SATURATION: 100 % | RESPIRATION RATE: 22 BRPM | SYSTOLIC BLOOD PRESSURE: 227 MMHG | HEART RATE: 91 BPM | BODY MASS INDEX: 30.24 KG/M2 | WEIGHT: 150 LBS | DIASTOLIC BLOOD PRESSURE: 88 MMHG | TEMPERATURE: 99 F

## 2021-03-08 DIAGNOSIS — L03.116 BILATERAL LOWER LEG CELLULITIS: Primary | ICD-10-CM

## 2021-03-08 DIAGNOSIS — L03.115 BILATERAL LOWER LEG CELLULITIS: Primary | ICD-10-CM

## 2021-03-08 LAB
ALBUMIN SERPL-MCNC: 3.4 G/DL (ref 3.4–5)
ALBUMIN/GLOB SERPL: 1.2 {RATIO} (ref 0.8–1.7)
ALP SERPL-CCNC: 94 U/L (ref 45–117)
ALT SERPL-CCNC: 25 U/L (ref 13–56)
ANION GAP SERPL CALC-SCNC: 6 MMOL/L (ref 3–18)
AST SERPL-CCNC: 13 U/L (ref 10–38)
BASOPHILS # BLD: 0 K/UL (ref 0–0.1)
BASOPHILS NFR BLD: 0 % (ref 0–2)
BILIRUB SERPL-MCNC: 0.2 MG/DL (ref 0.2–1)
BNP SERPL-MCNC: 3035 PG/ML (ref 0–900)
BUN SERPL-MCNC: 11 MG/DL (ref 7–18)
BUN/CREAT SERPL: 11 (ref 12–20)
CALCIUM SERPL-MCNC: 8.4 MG/DL (ref 8.5–10.1)
CHLORIDE SERPL-SCNC: 107 MMOL/L (ref 100–111)
CK MB CFR SERPL CALC: 3.9 % (ref 0–4)
CK MB SERPL-MCNC: 2.6 NG/ML (ref 5–25)
CK SERPL-CCNC: 67 U/L (ref 26–192)
CO2 SERPL-SCNC: 31 MMOL/L (ref 21–32)
CREAT SERPL-MCNC: 0.98 MG/DL (ref 0.6–1.3)
DIFFERENTIAL METHOD BLD: ABNORMAL
EOSINOPHIL # BLD: 0.2 K/UL (ref 0–0.4)
EOSINOPHIL NFR BLD: 1 % (ref 0–5)
ERYTHROCYTE [DISTWIDTH] IN BLOOD BY AUTOMATED COUNT: 14.3 % (ref 11.6–14.5)
GLOBULIN SER CALC-MCNC: 2.9 G/DL (ref 2–4)
GLUCOSE SERPL-MCNC: 144 MG/DL (ref 74–99)
HCT VFR BLD AUTO: 36.8 % (ref 35–45)
HGB BLD-MCNC: 11.4 G/DL (ref 12–16)
LYMPHOCYTES # BLD: 1.6 K/UL (ref 0.9–3.6)
LYMPHOCYTES NFR BLD: 12 % (ref 21–52)
MCH RBC QN AUTO: 28.8 PG (ref 24–34)
MCHC RBC AUTO-ENTMCNC: 31 G/DL (ref 31–37)
MCV RBC AUTO: 92.9 FL (ref 74–97)
MONOCYTES # BLD: 1 K/UL (ref 0.05–1.2)
MONOCYTES NFR BLD: 7 % (ref 3–10)
NEUTS SEG # BLD: 11 K/UL (ref 1.8–8)
NEUTS SEG NFR BLD: 80 % (ref 40–73)
PLATELET # BLD AUTO: 276 K/UL (ref 135–420)
PMV BLD AUTO: 10.6 FL (ref 9.2–11.8)
POTASSIUM SERPL-SCNC: 3.6 MMOL/L (ref 3.5–5.5)
PROT SERPL-MCNC: 6.3 G/DL (ref 6.4–8.2)
RBC # BLD AUTO: 3.96 M/UL (ref 4.2–5.3)
SODIUM SERPL-SCNC: 144 MMOL/L (ref 136–145)
TROPONIN I SERPL-MCNC: 0.02 NG/ML (ref 0–0.04)
WBC # BLD AUTO: 13.7 K/UL (ref 4.6–13.2)

## 2021-03-08 PROCEDURE — 99284 EMERGENCY DEPT VISIT MOD MDM: CPT

## 2021-03-08 PROCEDURE — 96375 TX/PRO/DX INJ NEW DRUG ADDON: CPT

## 2021-03-08 PROCEDURE — 74011000250 HC RX REV CODE- 250: Performed by: EMERGENCY MEDICINE

## 2021-03-08 PROCEDURE — 96374 THER/PROPH/DIAG INJ IV PUSH: CPT

## 2021-03-08 PROCEDURE — 74011250636 HC RX REV CODE- 250/636: Performed by: EMERGENCY MEDICINE

## 2021-03-08 PROCEDURE — 82553 CREATINE MB FRACTION: CPT

## 2021-03-08 PROCEDURE — 93005 ELECTROCARDIOGRAM TRACING: CPT

## 2021-03-08 PROCEDURE — 85025 COMPLETE CBC W/AUTO DIFF WBC: CPT

## 2021-03-08 PROCEDURE — 96376 TX/PRO/DX INJ SAME DRUG ADON: CPT

## 2021-03-08 PROCEDURE — 74011636637 HC RX REV CODE- 636/637: Performed by: EMERGENCY MEDICINE

## 2021-03-08 PROCEDURE — 80053 COMPREHEN METABOLIC PANEL: CPT

## 2021-03-08 PROCEDURE — 71045 X-RAY EXAM CHEST 1 VIEW: CPT

## 2021-03-08 PROCEDURE — 83880 ASSAY OF NATRIURETIC PEPTIDE: CPT

## 2021-03-08 PROCEDURE — 94640 AIRWAY INHALATION TREATMENT: CPT

## 2021-03-08 RX ORDER — MORPHINE SULFATE 2 MG/ML
2 INJECTION, SOLUTION INTRAMUSCULAR; INTRAVENOUS
Status: COMPLETED | OUTPATIENT
Start: 2021-03-08 | End: 2021-03-08

## 2021-03-08 RX ORDER — IPRATROPIUM BROMIDE AND ALBUTEROL SULFATE 2.5; .5 MG/3ML; MG/3ML
3 SOLUTION RESPIRATORY (INHALATION) ONCE
Status: COMPLETED | OUTPATIENT
Start: 2021-03-08 | End: 2021-03-08

## 2021-03-08 RX ORDER — PREDNISONE 20 MG/1
60 TABLET ORAL
Status: COMPLETED | OUTPATIENT
Start: 2021-03-08 | End: 2021-03-08

## 2021-03-08 RX ORDER — FUROSEMIDE 10 MG/ML
40 INJECTION INTRAMUSCULAR; INTRAVENOUS
Status: COMPLETED | OUTPATIENT
Start: 2021-03-08 | End: 2021-03-08

## 2021-03-08 RX ORDER — MORPHINE SULFATE 4 MG/ML
4 INJECTION INTRAVENOUS
Status: DISCONTINUED | OUTPATIENT
Start: 2021-03-08 | End: 2021-03-08

## 2021-03-08 RX ORDER — HYDROCODONE BITARTRATE AND ACETAMINOPHEN 5; 325 MG/1; MG/1
1 TABLET ORAL AS NEEDED
Qty: 1 TAB | Refills: 0 | Status: SHIPPED | OUTPATIENT
Start: 2021-03-08 | End: 2021-03-11

## 2021-03-08 RX ADMIN — PREDNISONE 60 MG: 20 TABLET ORAL at 19:45

## 2021-03-08 RX ADMIN — FUROSEMIDE 40 MG: 10 INJECTION, SOLUTION INTRAMUSCULAR; INTRAVENOUS at 19:45

## 2021-03-08 RX ADMIN — MORPHINE SULFATE 2 MG: 2 INJECTION, SOLUTION INTRAMUSCULAR; INTRAVENOUS at 20:58

## 2021-03-08 RX ADMIN — IPRATROPIUM BROMIDE AND ALBUTEROL SULFATE 3 ML: .5; 3 SOLUTION RESPIRATORY (INHALATION) at 19:45

## 2021-03-08 NOTE — ED TRIAGE NOTES
Pt arriving via ambulance. Bilateral leg pain, SOB that is chronic. Pt is chronically on 5L NC. 100%. Seen recently for same.

## 2021-03-09 ENCOUNTER — HOSPITAL ENCOUNTER (INPATIENT)
Age: 56
LOS: 1 days | Discharge: HOME | DRG: 140 | End: 2021-03-12
Attending: EMERGENCY MEDICINE | Admitting: HOSPITALIST
Payer: MEDICAID

## 2021-03-09 ENCOUNTER — HOSPITAL ENCOUNTER (EMERGENCY)
Age: 56
Discharge: LEFT BEFORE DISCHARGE | End: 2021-03-09
Attending: EMERGENCY MEDICINE
Payer: MEDICAID

## 2021-03-09 ENCOUNTER — APPOINTMENT (OUTPATIENT)
Dept: GENERAL RADIOLOGY | Age: 56
DRG: 140 | End: 2021-03-09
Attending: EMERGENCY MEDICINE
Payer: MEDICAID

## 2021-03-09 DIAGNOSIS — J45.901 ACUTE EXACERBATION OF COPD WITH ASTHMA (HCC): ICD-10-CM

## 2021-03-09 DIAGNOSIS — J44.1 ACUTE EXACERBATION OF COPD WITH ASTHMA (HCC): ICD-10-CM

## 2021-03-09 DIAGNOSIS — I44.7 LEFT BUNDLE BRANCH BLOCK: ICD-10-CM

## 2021-03-09 DIAGNOSIS — I20.0 UNSTABLE ANGINA PECTORIS (HCC): Primary | ICD-10-CM

## 2021-03-09 DIAGNOSIS — R06.03 ACUTE RESPIRATORY DISTRESS: ICD-10-CM

## 2021-03-09 PROCEDURE — 74011250637 HC RX REV CODE- 250/637: Performed by: EMERGENCY MEDICINE

## 2021-03-09 PROCEDURE — 75810000275 HC EMERGENCY DEPT VISIT NO LEVEL OF CARE

## 2021-03-09 PROCEDURE — 93005 ELECTROCARDIOGRAM TRACING: CPT

## 2021-03-09 PROCEDURE — 71045 X-RAY EXAM CHEST 1 VIEW: CPT

## 2021-03-09 PROCEDURE — 5A09457 ASSISTANCE WITH RESPIRATORY VENTILATION, 24-96 CONSECUTIVE HOURS, CONTINUOUS POSITIVE AIRWAY PRESSURE: ICD-10-PCS | Performed by: INTERNAL MEDICINE

## 2021-03-09 PROCEDURE — 99285 EMERGENCY DEPT VISIT HI MDM: CPT

## 2021-03-09 RX ORDER — GUAIFENESIN 100 MG/5ML
324 LIQUID (ML) ORAL
Status: COMPLETED | OUTPATIENT
Start: 2021-03-09 | End: 2021-03-09

## 2021-03-09 RX ORDER — OXYCODONE AND ACETAMINOPHEN 5; 325 MG/1; MG/1
1 TABLET ORAL
Qty: 6 TAB | Refills: 0 | Status: SHIPPED | OUTPATIENT
Start: 2021-03-09 | End: 2021-03-16

## 2021-03-09 RX ADMIN — ASPIRIN 324 MG: 81 TABLET, CHEWABLE ORAL at 23:55

## 2021-03-09 NOTE — ED NOTES
Pt returns for concern she was given an rx for pain as needed and was given 1 pill. As courtesy to my colleague will rx for 6. Pt was seen and assessed in waiting room. . Aox. On baseline o2; sitting comfortably in no disstress.

## 2021-03-09 NOTE — ED PROVIDER NOTES
48-year-old female with multiple medical problems including CHF COPD and chronic kidney disease presents to the emergency department with shortness of breath. Patient states this is her baseline shortness of breath. She says she was discharged from the hospital on February 25 and she has not taken any Lasix. Patient is still smoking. She states that the past 4 days she has had her increased shortness of breath. No other reported treatments used. Patient has mild leg swelling. No belly pain or back pain no nausea no vomiting no chest pain no headache. No reported Covid exposure or fevers. No other issues expressed. The history is provided by the patient. No  was used.    Shortness of Breath         Past Medical History:   Diagnosis Date    CHF (congestive heart failure) (HCC)     Chronic respiratory failure with hypoxia ([address] 9/7/2020    CKD (chronic kidney disease) stage 3, GFR 30-59 ml/min 10/31/2019    Cocaine abuse ([address] 11/26/2017    COPD (chronic obstructive pulmonary disease) with chronic bronchitis ([address] 12/19/2017    Dependence on supplemental oxygen     Depression 3/22/2020    Drug-seeking behavior 11/19/2016    Endocrine disease     thyroid issues    Essential hypertension 3/10/2017    Fe deficiency anemia 10/27/2012    Fibromyalgia 12/16/2016    Gastroesophageal reflux disease without esophagitis 10/10/2016    Gastrointestinal disorder     \"blockage in my stomach\"    Hypercholesterolemia     Hypertension     Hypertensive heart failure ([address] 12/19/2017    Morbid obesity due to excess calories ([address] 3/10/2017    NSTEMI (non-ST elevated myocardial infarction) ([address] 3/22/2020    On home O2 12/3/2015    Overview:  2L at home per pt for approx 8 years    Polysubstance abuse ([address] 9/15/2018    Respiratory failure ([address] 1/11/2017    S/P hernia repair 10/31/2019    Sleep apnea 12/19/2017    T wave inversion in EKG 3/9/2019    Tobacco use 9/28/2019    Uncomplicated severe persistent asthma 12/13/2016    Vocal cord disease 12/7/2016       Past Surgical History:   Procedure Laterality Date    HX GI      Exploratory laparotomy with lysis of adhesions and primary repair of incarcerated umbilical hernia         No family history on file. Social History     Socioeconomic History    Marital status: SINGLE     Spouse name: Not on file    Number of children: Not on file    Years of education: Not on file    Highest education level: Not on file   Occupational History    Not on file   Social Needs    Financial resource strain: Not on file    Food insecurity     Worry: Not on file     Inability: Not on file    Transportation needs     Medical: Not on file     Non-medical: Not on file   Tobacco Use    Smoking status: Former Smoker     Packs/day: 0.25    Smokeless tobacco: Current User    Tobacco comment: Pt states she has not had money to buy cigarettes in the past month   Substance and Sexual Activity    Alcohol use: No     Comment: socially    Drug use: Not Currently     Types: Heroin     Comment: pt reports using approximately a month ago    Sexual activity: Not Currently     Comment: last used 9 years ago   Lifestyle    Physical activity     Days per week: Not on file     Minutes per session: Not on file    Stress: Not on file   Relationships    Social connections     Talks on phone: Not on file     Gets together: Not on file     Attends Orthodox service: Not on file     Active member of club or organization: Not on file     Attends meetings of clubs or organizations: Not on file     Relationship status: Not on file    Intimate partner violence     Fear of current or ex partner: Not on file     Emotionally abused: Not on file     Physically abused: Not on file     Forced sexual activity: Not on file   Other Topics Concern    Not on file   Social History Narrative    Not on file         ALLERGIES: Patient has no known allergies.     Review of Systems   Constitutional: Negative. HENT: Negative. Respiratory: Positive for shortness of breath. Cardiovascular: Negative. Genitourinary: Negative. Musculoskeletal: Negative. Neurological: Negative. Hematological: Negative. Psychiatric/Behavioral: Negative. All other systems reviewed and are negative. Vitals:    03/08/21 1820 03/08/21 1823 03/08/21 1837   BP:  (!) 239/82 (!) 227/88   Pulse: 91     Resp: 22     Temp: 99 °F (37.2 °C)     SpO2: 100%     Weight: 68 kg (150 lb)     Height: 4' 11\" (1.499 m)              Physical Exam  Constitutional:       Appearance: She is well-developed. HENT:      Head: Normocephalic and atraumatic. Neck:      Musculoskeletal: Normal range of motion and neck supple. Cardiovascular:      Rate and Rhythm: Normal rate and regular rhythm. Pulmonary:      Effort: Tachypnea present. Breath sounds: Normal breath sounds. Chest:      Chest wall: No mass, deformity, tenderness, crepitus or edema. There is no dullness to percussion. Abdominal:      Palpations: Abdomen is soft. There is no hepatomegaly, splenomegaly or mass. Tenderness: There is no abdominal tenderness. There is no guarding or rebound. Musculoskeletal: Normal range of motion. Skin:     General: Skin is warm. Capillary Refill: Capillary refill takes less than 2 seconds. Neurological:      Mental Status: She is alert. Psychiatric:      Comments: Anxious           MDM  Number of Diagnoses or Management Options  Diagnosis management comments: 8:33 PM  Work-up is unremarkable will discharge patient home she is requesting pain medication will discharge and give prescription prescription for Lasix. BNP is elevated however chest x-ray remains clear we will repeat vitals and most likely discharge home.        Amount and/or Complexity of Data Reviewed  Clinical lab tests: ordered and reviewed  Tests in the radiology section of CPT®: ordered and reviewed  Tests in the medicine section of CPT®: ordered and reviewed           Procedures

## 2021-03-09 NOTE — Clinical Note
Patient Class[de-identified] OBSERVATION [104]   Type of Bed: Telemetry [19]   Reason for Observation: Chest pain evaluation   Admitting Diagnosis: Chest pain at rest [[id number]]   Admitting Physician: Dayna Anthony [[id number]   Attending Physician: Dayna Anthony [[id number]]

## 2021-03-10 ENCOUNTER — APPOINTMENT (OUTPATIENT)
Dept: NON INVASIVE DIAGNOSTICS | Age: 56
DRG: 140 | End: 2021-03-10
Attending: PHYSICIAN ASSISTANT
Payer: MEDICAID

## 2021-03-10 ENCOUNTER — APPOINTMENT (OUTPATIENT)
Dept: CT IMAGING | Age: 56
DRG: 140 | End: 2021-03-10
Attending: INTERNAL MEDICINE
Payer: MEDICAID

## 2021-03-10 PROBLEM — R07.9 CHEST PAIN AT REST: Status: ACTIVE | Noted: 2021-03-10

## 2021-03-10 LAB
ANION GAP SERPL CALC-SCNC: 4 MMOL/L (ref 3–18)
ATRIAL RATE: 105 BPM
ATRIAL RATE: 83 BPM
BASOPHILS # BLD: 0 K/UL (ref 0–0.1)
BASOPHILS NFR BLD: 0 % (ref 0–2)
BNP SERPL-MCNC: 2050 PG/ML (ref 0–900)
BUN SERPL-MCNC: 12 MG/DL (ref 7–18)
BUN/CREAT SERPL: 14 (ref 12–20)
CALCIUM SERPL-MCNC: 8.4 MG/DL (ref 8.5–10.1)
CALCULATED P AXIS, ECG09: 49 DEGREES
CALCULATED P AXIS, ECG09: 74 DEGREES
CALCULATED R AXIS, ECG10: -44 DEGREES
CALCULATED R AXIS, ECG10: 46 DEGREES
CALCULATED T AXIS, ECG11: 100 DEGREES
CALCULATED T AXIS, ECG11: 94 DEGREES
CHLORIDE SERPL-SCNC: 105 MMOL/L (ref 100–111)
CO2 SERPL-SCNC: 33 MMOL/L (ref 21–32)
CREAT SERPL-MCNC: 0.84 MG/DL (ref 0.6–1.3)
DIAGNOSIS, 93000: NORMAL
DIAGNOSIS, 93000: NORMAL
DIFFERENTIAL METHOD BLD: ABNORMAL
ECHO LV INTERNAL DIMENSION DIASTOLIC: 3.68 CM (ref 3.9–5.3)
ECHO LV INTERNAL DIMENSION SYSTOLIC: 2.33 CM
ECHO LV IVSD: 1.41 CM (ref 0.6–0.9)
ECHO LV MASS 2D: 198.3 G (ref 67–162)
ECHO LV MASS INDEX 2D: 125.2 G/M2 (ref 43–95)
ECHO LV POSTERIOR WALL DIASTOLIC: 1.51 CM (ref 0.6–0.9)
ECHO RV INTERNAL DIMENSION: 2.66 CM
EOSINOPHIL # BLD: 0.2 K/UL (ref 0–0.4)
EOSINOPHIL NFR BLD: 2 % (ref 0–5)
ERYTHROCYTE [DISTWIDTH] IN BLOOD BY AUTOMATED COUNT: 14 % (ref 11.6–14.5)
GLUCOSE SERPL-MCNC: 106 MG/DL (ref 74–99)
HCT VFR BLD AUTO: 33.6 % (ref 35–45)
HGB BLD-MCNC: 10.5 G/DL (ref 12–16)
LYMPHOCYTES # BLD: 2 K/UL (ref 0.9–3.6)
LYMPHOCYTES NFR BLD: 20 % (ref 21–52)
MCH RBC QN AUTO: 28.2 PG (ref 24–34)
MCHC RBC AUTO-ENTMCNC: 31.3 G/DL (ref 31–37)
MCV RBC AUTO: 90.1 FL (ref 74–97)
MONOCYTES # BLD: 0.5 K/UL (ref 0.05–1.2)
MONOCYTES NFR BLD: 5 % (ref 3–10)
NEUTS SEG # BLD: 7.3 K/UL (ref 1.8–8)
NEUTS SEG NFR BLD: 73 % (ref 40–73)
P-R INTERVAL, ECG05: 116 MS
P-R INTERVAL, ECG05: 124 MS
PLATELET # BLD AUTO: 252 K/UL (ref 135–420)
PMV BLD AUTO: 10.2 FL (ref 9.2–11.8)
POTASSIUM SERPL-SCNC: 3.6 MMOL/L (ref 3.5–5.5)
Q-T INTERVAL, ECG07: 400 MS
Q-T INTERVAL, ECG07: 418 MS
QRS DURATION, ECG06: 126 MS
QRS DURATION, ECG06: 132 MS
QTC CALCULATION (BEZET), ECG08: 491 MS
QTC CALCULATION (BEZET), ECG08: 528 MS
RBC # BLD AUTO: 3.73 M/UL (ref 4.2–5.3)
SODIUM SERPL-SCNC: 142 MMOL/L (ref 136–145)
TROPONIN I SERPL-MCNC: <0.02 NG/ML (ref 0–0.04)
TROPONIN I SERPL-MCNC: <0.02 NG/ML (ref 0–0.04)
VENTRICULAR RATE, ECG03: 105 BPM
VENTRICULAR RATE, ECG03: 83 BPM
WBC # BLD AUTO: 10 K/UL (ref 4.6–13.2)

## 2021-03-10 PROCEDURE — 83880 ASSAY OF NATRIURETIC PEPTIDE: CPT

## 2021-03-10 PROCEDURE — 85025 COMPLETE CBC W/AUTO DIFF WBC: CPT

## 2021-03-10 PROCEDURE — 96375 TX/PRO/DX INJ NEW DRUG ADDON: CPT

## 2021-03-10 PROCEDURE — 94640 AIRWAY INHALATION TREATMENT: CPT

## 2021-03-10 PROCEDURE — 99223 1ST HOSP IP/OBS HIGH 75: CPT | Performed by: INTERNAL MEDICINE

## 2021-03-10 PROCEDURE — 74011250637 HC RX REV CODE- 250/637: Performed by: INTERNAL MEDICINE

## 2021-03-10 PROCEDURE — 99218 HC RM OBSERVATION: CPT

## 2021-03-10 PROCEDURE — 74011250636 HC RX REV CODE- 250/636: Performed by: PHYSICIAN ASSISTANT

## 2021-03-10 PROCEDURE — 74011000250 HC RX REV CODE- 250: Performed by: INTERNAL MEDICINE

## 2021-03-10 PROCEDURE — 74011250636 HC RX REV CODE- 250/636: Performed by: INTERNAL MEDICINE

## 2021-03-10 PROCEDURE — 74011000250 HC RX REV CODE- 250

## 2021-03-10 PROCEDURE — 84484 ASSAY OF TROPONIN QUANT: CPT

## 2021-03-10 PROCEDURE — 80048 BASIC METABOLIC PNL TOTAL CA: CPT

## 2021-03-10 PROCEDURE — 96376 TX/PRO/DX INJ SAME DRUG ADON: CPT

## 2021-03-10 PROCEDURE — 93005 ELECTROCARDIOGRAM TRACING: CPT

## 2021-03-10 PROCEDURE — 71275 CT ANGIOGRAPHY CHEST: CPT

## 2021-03-10 PROCEDURE — 74011000636 HC RX REV CODE- 636: Performed by: INTERNAL MEDICINE

## 2021-03-10 PROCEDURE — 93308 TTE F-UP OR LMTD: CPT

## 2021-03-10 RX ORDER — IPRATROPIUM BROMIDE AND ALBUTEROL SULFATE 2.5; .5 MG/3ML; MG/3ML
3 SOLUTION RESPIRATORY (INHALATION)
Status: DISCONTINUED | OUTPATIENT
Start: 2021-03-10 | End: 2021-03-12 | Stop reason: HOSPADM

## 2021-03-10 RX ORDER — LOSARTAN POTASSIUM 25 MG/1
25 TABLET ORAL DAILY
Status: DISCONTINUED | OUTPATIENT
Start: 2021-03-11 | End: 2021-03-12 | Stop reason: HOSPADM

## 2021-03-10 RX ORDER — ACETAMINOPHEN 650 MG/1
650 SUPPOSITORY RECTAL
Status: DISCONTINUED | OUTPATIENT
Start: 2021-03-10 | End: 2021-03-12 | Stop reason: HOSPADM

## 2021-03-10 RX ORDER — MONTELUKAST SODIUM 10 MG/1
10 TABLET ORAL
Status: DISCONTINUED | OUTPATIENT
Start: 2021-03-10 | End: 2021-03-12 | Stop reason: HOSPADM

## 2021-03-10 RX ORDER — NITROGLYCERIN 0.4 MG/1
0.4 TABLET SUBLINGUAL
Status: DISCONTINUED | OUTPATIENT
Start: 2021-03-10 | End: 2021-03-12 | Stop reason: HOSPADM

## 2021-03-10 RX ORDER — POLYETHYLENE GLYCOL 3350 17 G/17G
17 POWDER, FOR SOLUTION ORAL DAILY PRN
Status: DISCONTINUED | OUTPATIENT
Start: 2021-03-10 | End: 2021-03-12 | Stop reason: HOSPADM

## 2021-03-10 RX ORDER — ACETAMINOPHEN 325 MG/1
650 TABLET ORAL
Status: DISCONTINUED | OUTPATIENT
Start: 2021-03-10 | End: 2021-03-12 | Stop reason: HOSPADM

## 2021-03-10 RX ORDER — CITALOPRAM 20 MG/1
20 TABLET, FILM COATED ORAL
Status: DISCONTINUED | OUTPATIENT
Start: 2021-03-11 | End: 2021-03-12 | Stop reason: HOSPADM

## 2021-03-10 RX ORDER — ENOXAPARIN SODIUM 100 MG/ML
40 INJECTION SUBCUTANEOUS DAILY
Status: DISCONTINUED | OUTPATIENT
Start: 2021-03-11 | End: 2021-03-12 | Stop reason: HOSPADM

## 2021-03-10 RX ORDER — PROMETHAZINE HYDROCHLORIDE 12.5 MG/1
12.5 TABLET ORAL
Status: DISCONTINUED | OUTPATIENT
Start: 2021-03-10 | End: 2021-03-12 | Stop reason: HOSPADM

## 2021-03-10 RX ORDER — ONDANSETRON 2 MG/ML
4 INJECTION INTRAMUSCULAR; INTRAVENOUS
Status: DISCONTINUED | OUTPATIENT
Start: 2021-03-10 | End: 2021-03-12 | Stop reason: HOSPADM

## 2021-03-10 RX ORDER — IPRATROPIUM BROMIDE AND ALBUTEROL SULFATE 2.5; .5 MG/3ML; MG/3ML
SOLUTION RESPIRATORY (INHALATION)
Status: COMPLETED
Start: 2021-03-10 | End: 2021-03-10

## 2021-03-10 RX ORDER — IPRATROPIUM BROMIDE AND ALBUTEROL SULFATE 2.5; .5 MG/3ML; MG/3ML
3 SOLUTION RESPIRATORY (INHALATION) ONCE
Status: COMPLETED | OUTPATIENT
Start: 2021-03-10 | End: 2021-03-10

## 2021-03-10 RX ORDER — SODIUM CHLORIDE 0.9 % (FLUSH) 0.9 %
5-40 SYRINGE (ML) INJECTION EVERY 8 HOURS
Status: DISCONTINUED | OUTPATIENT
Start: 2021-03-10 | End: 2021-03-12 | Stop reason: HOSPADM

## 2021-03-10 RX ORDER — SODIUM CHLORIDE 0.9 % (FLUSH) 0.9 %
5-40 SYRINGE (ML) INJECTION AS NEEDED
Status: DISCONTINUED | OUTPATIENT
Start: 2021-03-10 | End: 2021-03-12 | Stop reason: HOSPADM

## 2021-03-10 RX ORDER — ASPIRIN 81 MG/1
81 TABLET ORAL DAILY
Status: DISCONTINUED | OUTPATIENT
Start: 2021-03-11 | End: 2021-03-12 | Stop reason: HOSPADM

## 2021-03-10 RX ORDER — ATORVASTATIN CALCIUM 20 MG/1
20 TABLET, FILM COATED ORAL
Status: DISCONTINUED | OUTPATIENT
Start: 2021-03-10 | End: 2021-03-12 | Stop reason: HOSPADM

## 2021-03-10 RX ORDER — CLONIDINE HYDROCHLORIDE 0.1 MG/1
0.2 TABLET ORAL EVERY 12 HOURS
Status: DISCONTINUED | OUTPATIENT
Start: 2021-03-10 | End: 2021-03-12 | Stop reason: HOSPADM

## 2021-03-10 RX ADMIN — METHYLPREDNISOLONE SODIUM SUCCINATE 60 MG: 40 INJECTION, POWDER, FOR SOLUTION INTRAMUSCULAR; INTRAVENOUS at 15:43

## 2021-03-10 RX ADMIN — AZITHROMYCIN DIHYDRATE 500 MG: 500 INJECTION, POWDER, LYOPHILIZED, FOR SOLUTION INTRAVENOUS at 15:45

## 2021-03-10 RX ADMIN — ARFORMOTEROL TARTRATE: 15 SOLUTION RESPIRATORY (INHALATION) at 17:42

## 2021-03-10 RX ADMIN — ATORVASTATIN CALCIUM 20 MG: 20 TABLET, FILM COATED ORAL at 22:05

## 2021-03-10 RX ADMIN — METHYLPREDNISOLONE SODIUM SUCCINATE 60 MG: 40 INJECTION, POWDER, FOR SOLUTION INTRAMUSCULAR; INTRAVENOUS at 22:05

## 2021-03-10 RX ADMIN — CLONIDINE HYDROCHLORIDE 0.2 MG: 0.1 TABLET ORAL at 15:40

## 2021-03-10 RX ADMIN — MONTELUKAST SODIUM 10 MG: 10 TABLET, COATED ORAL at 22:05

## 2021-03-10 RX ADMIN — IPRATROPIUM BROMIDE AND ALBUTEROL SULFATE 3 ML: .5; 3 SOLUTION RESPIRATORY (INHALATION) at 20:00

## 2021-03-10 RX ADMIN — CLONIDINE HYDROCHLORIDE 0.2 MG: 0.1 TABLET ORAL at 22:05

## 2021-03-10 RX ADMIN — Medication 40 ML: at 14:00

## 2021-03-10 RX ADMIN — IPRATROPIUM BROMIDE AND ALBUTEROL SULFATE 3 ML: 2.5; .5 SOLUTION RESPIRATORY (INHALATION) at 05:13

## 2021-03-10 RX ADMIN — IPRATROPIUM BROMIDE AND ALBUTEROL SULFATE 3 ML: .5; 3 SOLUTION RESPIRATORY (INHALATION) at 05:13

## 2021-03-10 RX ADMIN — Medication 40 ML: at 22:00

## 2021-03-10 RX ADMIN — IPRATROPIUM BROMIDE AND ALBUTEROL SULFATE 3 ML: .5; 3 SOLUTION RESPIRATORY (INHALATION) at 15:43

## 2021-03-10 RX ADMIN — IOPAMIDOL 100 ML: 755 INJECTION, SOLUTION INTRAVENOUS at 22:33

## 2021-03-10 NOTE — H&P
Hospitalist Admission History and Physical    NAME:  Margie Saldivar   :   1965   MRN:   [id number]     PCP:  Erin Lafleur MD  Date/Time:  3/10/2021 1:27 PM  Subjective:   CHIEF COMPLAINT: Shortness of breath and chest pain    HISTORY OF PRESENT ILLNESS:     Zen Esposito is a 63 y.o.   female with history of chronic respiratory failure and, polysubstance abuse well-known to our service for frequent admissions due to respiratory complaints who presents with above-stated complaints. She states she never got better and mentions that her symptoms were worse since her last hospitalization a few weeks ago and especially worse over the last few days. She denies fevers but does report having chills. She denies contact with Covid positive patients. She reports cough that is productive of \"sticky \"sputum. She has prominent complaints of abdominal pain associated with cough, also complains of chest pressure. In the ED she is noted to have what appears to be new left bundle branch block on EKG although her troponin remains normal x3. We have been asked to see patient for further evaluation and management.         Past Medical History:   Diagnosis Date    CHF (congestive heart failure) (HCC)     Chronic respiratory failure with hypoxia ([address] 2020    CKD (chronic kidney disease) stage 3, GFR 30-59 ml/min 10/31/2019    Cocaine abuse ([address] 2017    COPD (chronic obstructive pulmonary disease) with chronic bronchitis ([address] 2017    Dependence on supplemental oxygen     Depression 3/22/2020    Drug-seeking behavior 2016    Endocrine disease     thyroid issues    Essential hypertension 3/10/2017    Fe deficiency anemia 10/27/2012    Fibromyalgia 2016    Gastroesophageal reflux disease without esophagitis 10/10/2016    Gastrointestinal disorder     \"blockage in my stomach\"    Hypercholesterolemia     Hypertension     Hypertensive heart failure ([address] 2017    Morbid obesity due to excess calories ([facility] 3/10/2017    NSTEMI (non-ST elevated myocardial infarction) ([facility] 3/22/2020    On home O2 12/3/2015    Overview:  2L at home per pt for approx 8 years    Polysubstance abuse ([facility] 9/15/2018    Respiratory failure ([facility] 2017    S/P hernia repair 10/31/2019    Sleep apnea 2017    T wave inversion in EKG 3/9/2019    Tobacco use     Uncomplicated severe persistent asthma 2016    Vocal cord disease 2016        Past Surgical History:   Procedure Laterality Date    HX GI      Exploratory laparotomy with lysis of adhesions and primary repair of incarcerated umbilical hernia       Social History     Tobacco Use    Smoking status: Former Smoker     Packs/day: 0.25    Smokeless tobacco: Current User    Tobacco comment: Pt states she has not had money to buy cigarettes in the past month   Substance Use Topics    Alcohol use: No     Comment: socially        No family history on file. No Known Allergies     Prior to Admission Medications   Prescriptions Last Dose Informant Patient Reported? Taking? HYDROcodone-acetaminophen (Norco) 5-325 mg per tablet   No No   Sig: Take 1 Tab by mouth as needed for Pain for up to 3 days. Max Daily Amount: 96 Tabs. Lactobacillus Acidoph & Bulgar (FLORANEX) 1 million cell tab tablet   No No   Sig: Take 2 Tabs by mouth two (2) times a day for 10 days. Nebulizer & Compressor machine   No No   Si Each by Does Not Apply route as needed for Wheezing or Shortness of Breath. OTHER   No No   Sig: BMP on 2021 and please call patient's primary care physicians with the report  Reason: Hypertension and cellulitis   OXYGEN-AIR DELIVERY SYSTEMS   Yes No   Sig: 3 L by IntraNASal route as needed for Other (sob).    albuterol (PROVENTIL VENTOLIN) 2.5 mg /3 mL (0.083 %) nebu   No No   Si nebulizer treatment every 4 hours while awake for 1 week then every 4 hours as needed for shortness of breath   aspirin delayed-release 81 mg tablet   No No   Sig: Take 1 Tab by mouth daily. atorvastatin (LIPITOR) 20 mg tablet   No No   Sig: Take 1 Tab by mouth nightly. citalopram (CeleXA) 20 mg tablet   No No   Sig: Take 1 Tab by mouth every morning. clindamycin (CLEOCIN) 300 mg capsule   No No   Sig: Take 1 Cap by mouth four (4) times daily for 7 days. cloNIDine HCL (CATAPRES) 0.2 mg tablet   No No   Sig: Take 1 Tab by mouth every twelve (12) hours. doxycycline (MONODOX) 100 mg capsule   No No   Sig: Take 1 Cap by mouth every twelve (12) hours for 7 days. famotidine (PEPCID) 20 mg tablet   No No   Sig: Take 1 Tab by mouth nightly. fluticasone furoate-vilanteroL (Breo Ellipta) 200-25 mcg/dose inhaler   No No   Sig: Take 1 Puff by inhalation daily. Indications: controller medication for asthma   losartan (COZAAR) 25 mg tablet   No No   Sig: Take 1 Tab by mouth daily. Indications: high blood pressure   montelukast (SINGULAIR) 10 mg tablet   No No   Sig: Take 1 Tab by mouth nightly. Indications: controller medication for asthma   naloxone (NARCAN) 4 mg/actuation nasal spray   No No   Sig: Use 1 spray intranasally, then discard. Repeat with new spray every 2 min as needed for opioid overdose symptoms, alternating nostrils. nitroglycerin (NITROSTAT) 0.4 mg SL tablet   No No   Sig: Take 1 Tab by mouth every five (5) minutes as needed for Chest Pain. Sit/Lay down then put one tab under the tongue every 5 minutes as needed for chest pain for 3 doses   oxyCODONE-acetaminophen (Percocet) 5-325 mg per tablet   No No   Sig: Take 1 Tab by mouth every four (4) hours as needed for Pain for up to 7 days. Max Daily Amount: 6 Tabs. oxyCODONE-acetaminophen (Percocet) 7.5-325 mg per tablet   No No   Sig: Take 1 Tab by mouth every eight (8) hours as needed for Pain for up to 7 days. Max Daily Amount: 3 Tabs.    predniSONE (DELTASONE) 10 mg tablet   No No   Si tablets p.o. daily for 3 days then 3 tablets daily for 5 days then 2 tablets p.o. daily for 5 days then 1 tablet p.o. daily   roflumilast (DALIRESP) 500 mcg tab tablet   No No   Sig: Take 1 Tab by mouth daily. Facility-Administered Medications: None       REVIEW OF SYSTEMS:    Please see above otw 10 point ROS checked and negative. Objective:   VITALS:    Visit Vitals  BP (!) 173/80   Pulse 96   Temp 98.5 °F (36.9 °C)   Resp 18   Ht 4' 11\" (1.499 m)   Wt 63.5 kg (140 lb)   LMP 2009   SpO2 99%   BMI 28.28 kg/m²     Temp (24hrs), Av.5 °F (36.9 °C), Min:98.5 °F (36.9 °C), Max:98.5 °F (36.9 °C)      PHYSICAL EXAM:   General:    Alert, cooperative, no distress, appears stated age, with mild agitation at times. Head:   Normocephalic, without obvious abnormality, atraumatic. Eyes:   Conjunctivae clear, anicteric sclerae. Pupils are equal  Nose:  Nares normal. No drainage. Neck:  Supple, symmetrical,  no adenopathy,      and no JVD. Lungs:   Diffuse wheezing present bilaterally. No Rhonchi. No rales. Chest wall:  Diffuse tenderness, no deformity. No Accessory muscle use. Heart:   Regular rate and rhythm,  no murmur, rub or gallop. Abdomen:   Soft, non-tender. Not distended. Bowel sounds normal. No masses  Extremities: [place] hyperpigmented skin changes consistent with livedo reticularis present on bilateral knees otherwise extremities normal, atraumatic, No cyanosis. No edema. No clubbing  Skin:     [place] hyperpigmented skin changes consistent with livedo reticularis present on bilateral knees Texture, turgor normal. No rashes or lesions. Not Jaundiced  Lymph nodes: Cervical, supraclavicular normal.  Psych:  Good insight. Not depressed. Not anxious with mild agitation. Neurologic: EOMs intact. No facial asymmetry. No aphasia or slurred speech. Normal   strength, Alert and oriented X 3.            LAB DATA REVIEWED:    No components found for: [place]  Recent Labs     03/10/21  0029 21  1825    144   K 3.6 3.6    107   CO2 33* 31   BUN 12 11   CREA 0.84 0.98   * 144*   CA 8.4* 8.4*   ALB  --  3.4   WBC 10.0 13.7*   HGB 10.5* 11.4*   HCT 33.6* 36.8    276         IMAGING RESULTS:   []  I have personally reviewed the actual   [x]CXR  []CT scan    CXR: No acute findings  CT :    Assessment/Plan:      Active Problems:    Chest pain at rest (3/10/2021)         -COPD with exacerbation  -Chest pain atypical but does have new left bundle branch block on EKG today, enzymes normal, cardiac echo done, results pending. Cardiology input noted, appreciate assistance.  -Chronic pain, demanding for pain medication.   Declining Ultram.     Patient is requesting for a new/different hospitalist.  ___________________________________________________  PLAN:      -Bronchodilator therapy scheduled and as needed  -IV steroids  -Oxygen supplementation  -Pulmonary consulted    Risk of deterioration:  [x]Low    []Moderate  []High              Prophylaxis:  [x]Lovenox  []Coumadin  []Hep SQ  []SCDs  []H2B/PPI    Disposition:  [x]Home w/ Family   []HH PT,OT,RN   []SNF/LTC   []SAH/Rehab    Discussed Code Status:    [x]Full Code      []DNR     ___________________________________________________    Care Plan discussed with:    [x]Patient   []Family    []ED Care Manager  []ED Doc   []Specialist :    Total Time Coordinating Admission:      minutes    []Total Critical Care Time:     ___________________________________________________  Admitting Physician: Aline Celeste MD

## 2021-03-10 NOTE — PROGRESS NOTES
Observation notice provided in writing to patient and/or caregiver as well as verbal explanation of the policy.   Patients who are in outpatient status also receive the Observation notice  Gabriela Gaitan RN - Outcomes Manager  [phone]

## 2021-03-10 NOTE — ED NOTES
Pt became agitated and stated she could not breathe. This RN administered a nebulizer treatment and cleaned up pt with Martha Moreno.

## 2021-03-10 NOTE — CONSULTS
[facility] - Consult Note    Consultation request by Rubi Mcginnis MD for advice/opinion related to evaluating Chest pain at rest [R07.9]    Date of  Admission: 3/9/2021  8:06 PM   Primary Care Physician:  Sharath Marie MD     Assessment:     Patient Active Problem List   Diagnosis Code    Abnormal CT of the chest R93.89    Asthma J45.909    Respiratory failure ([address] J96.90    Acute exacerbation of COPD with asthma ([address] J44.1, J45.901    Essential hypertension I10    Morbid obesity due to excess calories ([address] E66.01    Hypertensive heart failure ([address] I11.0    Sleep apnea G47.30    COPD (chronic obstructive pulmonary disease) with chronic bronchitis ([address] J44.9    T wave inversion in EKG R94.31    Tobacco use Z72.0    S/P hernia repair Z98.890, Z87.19    CKD (chronic kidney disease) stage 3, GFR 30-59 ml/min N18.30    Cocaine abuse ([address] F14.10    Drug-seeking behavior Z76.5    Fibromyalgia M79.7    Depression F32.9    Fe deficiency anemia D50.9    Gastroesophageal reflux disease without esophagitis K21.9    HLD (hyperlipidemia) E78.5    Thyroid goiter E04.9    MDRO (multiple drug resistant organisms) resistance [id number]    On home O2 Z99.81    Polysubstance abuse ([address] K53.04    Uncomplicated severe persistent asthma J45.50    Vocal cord disease J38.3    COPD with acute exacerbation ([address] J44.1    Chronic respiratory failure with hypoxia (HCC) J96.11    Acute bronchitis J20.9    Suspected COVID-19 virus infection Z20.822    Tachycardia R00.0    Normocytic anemia D64.9    Acute respiratory distress R06.03    COPD exacerbation (HCC) J44.1    Acute exacerbation of chronic obstructive pulmonary disease (COPD) (HCC) J44.1    UTI (urinary tract infection) N39.0    Atypical chest pain R07.89    DEMARCUS (acute kidney injury) ([address] N17.9    Asthma exacerbation J45. 0    COPD [facility]ch[facility] obstructive [facility] disease) [facility] J44.9    Acute bronchitis with chronic obstructive pulmonary disease (COPD) ([facility] J44.0, J20.9    CHF (congestive heart failure) ([facility]) I50.9    Long QT interval R94.31    Hypokalemia E87.6    Bilateral lower leg cellulitis L03.116, L03.115    Chest pain at rest R07.9       -Chest pain. History of recurrent noncardiac CP/chronic pain but now with new LBBB, possible rate related. Troponin unremarkable. No obstructive CAD on cardiac cath 3/2020. Echocardiogram done in the emergency room today showed normal LV function no regional wall motion abnormality. Her symptoms sound more atypical for angina I suspect either pulmonary in nature or musculoskeletal.    --New left bundle branch block. Suspect primarily an electrical phenomenon. Cardiac biomarkers have all been normal.  -Chronic pain syndrome.  -Hypertension. Uncontrolled. -COPD. On home oxygen. With recent recurrent COPD exacerbation admission. Significant wheezing on exam today. -H/o polysubstance abuse. On methadone. -H/o noncompliance. Plan:     No further cardiac testing planned. Continue BP management, COPD management and chronic pain management per primary team.  Patient stable for discharge once her acute respiratory issues have improved. We will be available if additional questions arise. History of Present Illness: This is a 63 y.o. female admitted for Chest pain at rest [R07.9]. Patient complains of:  CP. Patient is a 63year old female who suffers from chronic pain and COPD. Patient has had recurrent ER and hospital visits recently for Pain and COPD exacerbations. Patient presented for CP and SOB. Patient is asking for pain medications and is not provided much history other than complaining of pain all over. She feels sob and is complaining of wheezing. Cardiac risk factors: hypertension      Review of Symptoms:  Except as stated above include:  Constitutional:  negative  Respiratory:  negative  Cardiovascular:  C/o diffuse pain. C/o SOB.   Gastrointestinal: negative  Genitourinary:  negative  Musculoskeletal:  Negative  Neurological:  Negative  Dermatological:  Negative  Endocrinological: Negative  Psychological:  Negative       Past Medical History:     Past Medical History:   Diagnosis Date    CHF (congestive heart failure) ([address]     Chronic respiratory failure with hypoxia ([address] 9/7/2020    CKD (chronic kidney disease) stage 3, GFR 30-59 ml/min 10/31/2019    Cocaine abuse ([address] 11/26/2017    COPD (chronic obstructive pulmonary disease) with chronic bronchitis ([address] 12/19/2017    Dependence on supplemental oxygen     Depression 3/22/2020    Drug-seeking behavior 11/19/2016    Endocrine disease     thyroid issues    Essential hypertension 3/10/2017    Fe deficiency anemia 10/27/2012    Fibromyalgia 12/16/2016    Gastroesophageal reflux disease without esophagitis 10/10/2016    Gastrointestinal disorder     \"blockage in my stomach\"    Hypercholesterolemia     Hypertension     Hypertensive heart failure ([address] 12/19/2017    Morbid obesity due to excess calories ([address] 3/10/2017    NSTEMI (non-ST elevated myocardial infarction) ([address] 3/22/2020    On home O2 12/3/2015    Overview:  2L at home per pt for approx 8 years    Polysubstance abuse ([address] 9/15/2018    Respiratory failure ([address] 1/11/2017    S/P hernia repair 10/31/2019    Sleep apnea 12/19/2017    T wave inversion in EKG 3/9/2019    Tobacco use 5/31/0864    Uncomplicated severe persistent asthma 12/13/2016    Vocal cord disease 12/7/2016         Social History:     Social History     Socioeconomic History    Marital status: SINGLE     Spouse name: Not on file    Number of children: Not on file    Years of education: Not on file    Highest education level: Not on file   Tobacco Use    Smoking status: Former Smoker     Packs/day: 0.25    Smokeless tobacco: Current User    Tobacco comment: Pt states she has not had money to buy cigarettes in the past month Substance and Sexual Activity    Alcohol use: No     Comment: socially    Drug use: Not Currently     Types: Heroin     Comment: pt reports using approximately a month ago    Sexual activity: Not Currently     Comment: last used 9 years ago        Family History:   No family history on file. Medications:   No Known Allergies     No current facility-administered medications for this encounter. Current Outpatient Medications   Medication Sig    oxyCODONE-acetaminophen (Percocet) 5-325 mg per tablet Take 1 Tab by mouth every four (4) hours as needed for Pain for up to 7 days. Max Daily Amount: 6 Tabs.  HYDROcodone-acetaminophen (Norco) 5-325 mg per tablet Take 1 Tab by mouth as needed for Pain for up to 3 days. Max Daily Amount: 96 Tabs.  predniSONE (DELTASONE) 10 mg tablet 4 tablets p.o. daily for 3 days then 3 tablets daily for 5 days then 2 tablets p.o. daily for 5 days then 1 tablet p.o. daily    albuterol (PROVENTIL VENTOLIN) 2.5 mg /3 mL (0.083 %) nebu 1 nebulizer treatment every 4 hours while awake for 1 week then every 4 hours as needed for shortness of breath    Lactobacillus Acidoph & Bulgar (FLORANEX) 1 million cell tab tablet Take 2 Tabs by mouth two (2) times a day for 10 days.  cloNIDine HCL (CATAPRES) 0.2 mg tablet Take 1 Tab by mouth every twelve (12) hours.  famotidine (PEPCID) 20 mg tablet Take 1 Tab by mouth nightly.  losartan (COZAAR) 25 mg tablet Take 1 Tab by mouth daily. Indications: high blood pressure    Nebulizer & Compressor machine 1 Each by Does Not Apply route as needed for Wheezing or Shortness of Breath.  fluticasone furoate-vilanteroL (Breo Ellipta) 200-25 mcg/dose inhaler Take 1 Puff by inhalation daily. Indications: controller medication for asthma    nitroglycerin (NITROSTAT) 0.4 mg SL tablet Take 1 Tab by mouth every five (5) minutes as needed for Chest Pain.  Sit/Lay down then put one tab under the tongue every 5 minutes as needed for chest pain for 3 doses    aspirin delayed-release 81 mg tablet Take 1 Tab by mouth daily.  montelukast (SINGULAIR) 10 mg tablet Take 1 Tab by mouth nightly. Indications: controller medication for asthma    roflumilast (DALIRESP) 500 mcg tab tablet Take 1 Tab by mouth daily.  citalopram (CeleXA) 20 mg tablet Take 1 Tab by mouth every morning.  OXYGEN-AIR DELIVERY SYSTEMS 3 L by IntraNASal route as needed for Other (sob).  atorvastatin (LIPITOR) 20 mg tablet Take 1 Tab by mouth nightly.  naloxone (NARCAN) 4 mg/actuation nasal spray Use 1 spray intranasally, then discard. Repeat with new spray every 2 min as needed for opioid overdose symptoms, alternating nostrils. Physical Exam:     Visit Vitals  BP (!) 173/80 (BP 1 Location: Right upper arm, BP Patient Position: Sitting)   Pulse 96   Temp 98.5 °F (36.9 °C)   Resp 18   Ht 4' 11\" (1.499 m)   Wt 140 lb (63.5 kg)   SpO2 99%   BMI 28.28 kg/m²     BP Readings from Last 3 Encounters:   03/09/21 (!) 173/80   03/08/21 (!) 227/88   03/05/21 (!) 165/85     Pulse Readings from Last 3 Encounters:   03/09/21 96   03/08/21 91   03/05/21 83     Wt Readings from Last 3 Encounters:   03/09/21 140 lb (63.5 kg)   03/08/21 150 lb (68 kg)   03/01/21 180 lb 6.4 oz (81.8 kg)       General:  alert, cooperative, no distress, appears stated age  Neck:  no JVD  Lungs:  diminished breath sounds scattered wheezing  Heart:  regular rate and rhythm, S1, S2 normal, no murmur, click, rub or gallop  Abdomen:  abdomen is soft without significant tenderness, masses, organomegaly or guarding  Extremities:  extremities normal, atraumatic, no cyanosis or edema  Skin: Warm and dry.  no hyperpigmentation, vitiligo, or suspicious lesions  Neuro: alert, oriented x3, affect appropriate  Psych: non focal     Data Review:     Recent Labs     03/10/21  0029 03/08/21  1825   WBC 10.0 13.7*   HGB 10.5* 11.4*   HCT 33.6* 36.8    276     Recent Labs     03/10/21  0029 03/08/21  1825    144 K 3.6 3.6    107   CO2 33* 31   * 144*   BUN 12 11   CREA 0.84 0.98   CA 8.4* 8.4*   ALB  --  3.4   ALT  --  25       Results for orders placed or performed during the hospital encounter of 03/09/21   EKG, 12 LEAD, INITIAL   Result Value Ref Range    Ventricular Rate 105 BPM    Atrial Rate 105 BPM    P-R Interval 116 ms    QRS Duration 126 ms    Q-T Interval 400 ms    QTC Calculation (Bezet) 528 ms    Calculated P Axis 74 degrees    Calculated R Axis 46 degrees    Calculated T Axis 94 degrees    Diagnosis       Sinus tachycardia with premature supraventricular complexes  Possible Left atrial enlargement  Left bundle branch block  Abnormal ECG  When compared with ECG of 08-MAR-2021 18:32,  premature supraventricular complexes are now present  Left bundle branch block is now present  Criteria for Anteroseptal infarct are no longer present         All Cardiac Markers in the last 24 hours:    Lab Results   Component Value Date/Time    TROIQ <0.02 03/10/2021 12:29 AM       Last Lipid:  No results found for: CHOL, CHOLX, CHLST, CHOLV, HDL, HDLP, LDL, LDLC, DLDLP, TGLX, TRIGL, TRIGP, CHHD, CHHDX    Signed By: DARCIE Boston     March 10, 2021      Zen Simon MD

## 2021-03-10 NOTE — ACP (ADVANCE CARE PLANNING)
Advance Care Planning     Advance Care Planning Clinical Specialist  Conversation Note      Date of ACP Conversation: 3/9/2021    Conversation Conducted with:   Patient with Decision Making Capacity    ACP Clinical Specialist: Aftab Barth    *When Decision Maker makes decisions on behalf of the incapacitated patient: Decision Maker is asked to consider and make decisions based on patient values, known preferences, or best interests. Health Care Decision Maker:    Current Designated Health Care Decision Maker:   Primary Decision Maker: Nicki Nunez - Daughter - [phone]    Secondary Decision Maker: Mary Kate Cheney - Sister - [phone]  (If there is a [address] named in the [address]" box in the ACP activity, but it is not visible above, be sure to open that field and then select the health care decision maker relationship (ie \"primary\") in the blank space to the right of the name.) Validate  this information as still accurate & up-to-date; edit [address] field as needed.)    Note: Assess and validate information in current ACP documents, as indicated. If no Decision Maker listed above or available through scanned documents, then:    If no Authorized Decision Maker has previously been identified, then patient chooses [address]:  \"Who would you like to name as your primary health care decision-maker? \"    Name: Angelica Fermin   Relationship: daughter Phone number: [phone] or [phone]  gus Prim this person be reached easily? \" YES  \"Who would you like to name as your back-up decision maker? \"   Name: Daija Crispin  Relationship: sister Phone number: [phone]  Ulysess Prim this person be reached easily? \" YES    Note: If the relationship of these Decision-Makers to the patient does NOT follow your state's Next of Kin hierarchy, recommend that patient complete ACP document that meets state-specific requirements to allow them to act on the patient's behalf when appropriate.     Jennifer Carlisle RN - Outcomes Manager  [phone]

## 2021-03-10 NOTE — PROGRESS NOTES
Reason for Admission:  Chest pain at rest [R07.9]                 RUR Score:    N/A            Plan for utilizing home health:    TBD, per pt she has personal care. Doesn't know how many hours or who provides it. Very vague                      Likelihood of Readmission:   LOW                         Transition of Care Plan:              Initial assessment completed with patient. Cognitive status of patient: oriented to time, place, person and situation. Face sheet information confirmed:  yes. The patient designates daughter, Mariajose Nagel to participate in her discharge plan and to receive any needed information. This patient lives in a single family home alone. Patient is able to navigate steps as needed. Prior to hospitalization, patient was considered to be independent with ADLs/IADLS : no . If not independent,  patient needs assist with : dressing, bathing, food preparation, cooking and toileting    Patient has a current ACP document on file: no      Healthcare Decision Maker:   Primary Decision Maker: Eran Pruitt - Daughter - [phone]    Secondary Decision Maker: Ryan Russell - Sister - [phone]    Click here to complete [employer] including selection of the Healthcare Decision Maker Relationship (ie \"Primary\")    The patient will need transport home upon discharge. The patient already has oxygen supplied by [employer] available in the home. Patient is not currently active with home health. Patient has not stayed in a skilled nursing facility or rehab. This patient is on dialysis :no    Currently, the discharge plan is Home vs home with home health    The patient states that she can obtain her medications from the pharmacy, and take her medications as directed. Patient's current insurance is [employer]  PCP Verified by CM:  Yes  Mode of Transport at Discharge: Self  Transition of Care Consult (CM Consult): Discharge Planning  Current Support Network: Lives Alone  Confirm Follow Up Transport: Self  Discharge Location  Discharge Placement: Home        Maikel Botello RN - Outcomes Manager  [phone]

## 2021-03-10 NOTE — ED NOTES
Assumed patient care from [address]. Patient is alert and oriented x 4. Patient is in bed with covers pulled over her head.

## 2021-03-10 NOTE — ED NOTES
Spoke with Dr. Maylin Schneider about patient pain management. Per the patient \" I do not want Dr. Maylin Schneider as my physician. \" She will call the office and notify of the patient declining physician care. \"

## 2021-03-10 NOTE — ED PROVIDER NOTES
EMERGENCY DEPARTMENT HISTORY AND PHYSICAL EXAM      Date: 3/9/2021  Patient Name: Adrián Walker    History of Presenting Illness     Chief Complaint   Patient presents with    Shortness of Breath       History (Context): Adrián Walker is a 63 y.o. woman with who is well-known to me in this department and a complicated set of active comorbid conditions as noted below in the past medical history, who presents with acute onset, persistent, severe pain which is located in the substernal area and pressure-like. It is associated with shortness of breath and diaphoresis. The patient has needed increased oxygen. She says that it does not feel like her typical chest pain. The patient had a left heart cath 1 year ago that was clean. Patient was discharged earlier today with follow-up. On review of systems, the patient denies fever, chills, trauma, back pain, abdominal pain, nausea, vomiting, diaphoresis. PCP: Valarie Lopez MD    Current Outpatient Medications   Medication Sig Dispense Refill    oxyCODONE-acetaminophen (Percocet) 5-325 mg per tablet Take 1 Tab by mouth every four (4) hours as needed for Pain for up to 7 days. Max Daily Amount: 6 Tabs. 6 Tab 0    HYDROcodone-acetaminophen (Norco) 5-325 mg per tablet Take 1 Tab by mouth as needed for Pain for up to 3 days. Max Daily Amount: 96 Tabs. 1 Tab 0    predniSONE (DELTASONE) 10 mg tablet 4 tablets p.o. daily for 3 days then 3 tablets daily for 5 days then 2 tablets p.o. daily for 5 days then 1 tablet p.o. daily 60 Tab 0    albuterol (PROVENTIL VENTOLIN) 2.5 mg /3 mL (0.083 %) nebu 1 nebulizer treatment every 4 hours while awake for 1 week then every 4 hours as needed for shortness of breath 60 Each 1    Lactobacillus Acidoph & Bulgar (FLORANEX) 1 million cell tab tablet Take 2 Tabs by mouth two (2) times a day for 10 days. 40 Tab 0    cloNIDine HCL (CATAPRES) 0.2 mg tablet Take 1 Tab by mouth every twelve (12) hours.  60 Tab 0    famotidine (PEPCID) 20 mg tablet Take 1 Tab by mouth nightly. 10 Tab 0    losartan (COZAAR) 25 mg tablet Take 1 Tab by mouth daily. Indications: high blood pressure 30 Tab 0    Nebulizer & Compressor machine 1 Each by Does Not Apply route as needed for Wheezing or Shortness of Breath. 1 Each 0    fluticasone furoate-vilanteroL (Breo Ellipta) 200-25 mcg/dose inhaler Take 1 Puff by inhalation daily. Indications: controller medication for asthma 1 Inhaler 3    nitroglycerin (NITROSTAT) 0.4 mg SL tablet Take 1 Tab by mouth every five (5) minutes as needed for Chest Pain. Sit/Lay down then put one tab under the tongue every 5 minutes as needed for chest pain for 3 doses 1 Bottle 0    aspirin delayed-release 81 mg tablet Take 1 Tab by mouth daily. 30 Tab 0    montelukast (SINGULAIR) 10 mg tablet Take 1 Tab by mouth nightly. Indications: controller medication for asthma 30 Tab 1    roflumilast (DALIRESP) 500 mcg tab tablet Take 1 Tab by mouth daily. 30 Tab 3    citalopram (CeleXA) 20 mg tablet Take 1 Tab by mouth every morning. 30 Tab 0    OXYGEN-AIR DELIVERY SYSTEMS 3 L by IntraNASal route as needed for Other (sob).  atorvastatin (LIPITOR) 20 mg tablet Take 1 Tab by mouth nightly. 30 Tab 0    naloxone (NARCAN) 4 mg/actuation nasal spray Use 1 spray intranasally, then discard. Repeat with new spray every 2 min as needed for opioid overdose symptoms, alternating nostrils.  1 Each 0       Past History     Past Medical History:  Past Medical History:   Diagnosis Date    CHF (congestive heart failure) (HCC)     Chronic respiratory failure with hypoxia ([facility] 9/7/2020    CKD (chronic kidney disease) stage 3, GFR 30-59 ml/min 10/31/2019    Cocaine abuse ([facility] 11/26/2017    COPD (chronic obstructive pulmonary disease) with chronic bronchitis ([facility] 12/19/2017    Dependence on supplemental oxygen     Depression 3/22/2020    Drug-seeking behavior 11/19/2016    Endocrine disease     thyroid issues    Essential hypertension 3/10/2017    Fe deficiency anemia 10/27/2012    Fibromyalgia 12/16/2016    Gastroesophageal reflux disease without esophagitis 10/10/2016    Gastrointestinal disorder     \"blockage in my stomach\"    Hypercholesterolemia     Hypertension     Hypertensive heart failure ([address] 12/19/2017    Morbid obesity due to excess calories ([address] 3/10/2017    NSTEMI (non-ST elevated myocardial infarction) ([address] 3/22/2020    On home O2 12/3/2015    Overview:  2L at home per pt for approx 8 years    Polysubstance abuse ([address] 9/15/2018    Respiratory failure ([address] 1/11/2017    S/P hernia repair 10/31/2019    Sleep apnea 12/19/2017    T wave inversion in EKG 3/9/2019    Tobacco use 2/27/4840    Uncomplicated severe persistent asthma 12/13/2016    Vocal cord disease 12/7/2016       Past Surgical History:  Past Surgical History:   Procedure Laterality Date    HX GI      Exploratory laparotomy with lysis of adhesions and primary repair of incarcerated umbilical hernia       Family History:  No family history on file. Social History:  Social History     Tobacco Use    Smoking status: Former Smoker     Packs/day: 0.25    Smokeless tobacco: Current User    Tobacco comment: Pt states she has not had money to buy cigarettes in the past month   Substance Use Topics    Alcohol use: No     Comment: socially    Drug use: Not Currently     Types: Heroin     Comment: pt reports using approximately a month ago       Allergies:  No Known Allergies    PMH, PSH, family history, social history, allergies reviewed with the patient with significant items noted above. Review of Systems   As per HPI, otherwise reviewed and negative.      Physical Exam     Vitals:    03/09/21 1916   BP: (!) 173/80   Pulse: 96   Resp: 18   Temp: 98.5 °F (36.9 °C)   SpO2: 99%   Weight: 63.5 kg (140 lb)   Height: 4' 11\" (1.499 m)       Gen: ill-appearing, distressed  HEENT: Normocephalic, sclera anicteric  Cardiovascular: tachycardia, regular rhythm, no murmurs, rubs, gallops. Pulses intact and equal distally. Pulmonary: Moderate respiratory distress. No stridor. Clear lungs. ABD: Soft, nontender, nondistended. Neuro: Alert. Normal speech. Normal mentation. Psych: Normal thought content and thought processes. : No CVA tenderness  EXT: No Edema. Moves all extremities well. No cyanosis or clubbing. Skin: Warm and well-perfused. Other:        Diagnostic Study Results     Labs -     Recent Results (from the past 12 hour(s))   CBC WITH AUTOMATED DIFF    Collection Time: 03/10/21 12:29 AM   Result Value Ref Range    WBC 10.0 4.6 - 13.2 K/uL    RBC 3.73 (L) 4.20 - 5.30 M/uL    HGB 10.5 (L) 12.0 - 16.0 g/dL    HCT 33.6 (L) 35.0 - 45.0 %    MCV 90.1 74.0 - 97.0 FL    MCH 28.2 24.0 - 34.0 PG    MCHC 31.3 31.0 - 37.0 g/dL    RDW 14.0 11.6 - 14.5 %    PLATELET 961 703 - 012 K/uL    MPV 10.2 9.2 - 11.8 FL    NEUTROPHILS 73 40 - 73 %    LYMPHOCYTES 20 (L) 21 - 52 %    MONOCYTES 5 3 - 10 %    EOSINOPHILS 2 0 - 5 %    BASOPHILS 0 0 - 2 %    ABS. NEUTROPHILS 7.3 1.8 - 8.0 K/UL    ABS. LYMPHOCYTES 2.0 0.9 - 3.6 K/UL    ABS. MONOCYTES 0.5 0.05 - 1.2 K/UL    ABS. EOSINOPHILS 0.2 0.0 - 0.4 K/UL    ABS.  BASOPHILS 0.0 0.0 - 0.1 K/UL    DF AUTOMATED     METABOLIC PANEL, BASIC    Collection Time: 03/10/21 12:29 AM   Result Value Ref Range    Sodium 142 136 - 145 mmol/L    Potassium 3.6 3.5 - 5.5 mmol/L    Chloride 105 100 - 111 mmol/L    CO2 33 (H) 21 - 32 mmol/L    Anion gap 4 3.0 - 18 mmol/L    Glucose 106 (H) 74 - 99 mg/dL    BUN 12 7.0 - 18 MG/DL    Creatinine 0.84 0.6 - 1.3 MG/DL    BUN/Creatinine ratio 14 12 - 20      GFR est AA >60 >60 ml/min/1.73m2    GFR est non-AA >60 >60 ml/min/1.73m2    Calcium 8.4 (L) 8.5 - 10.1 MG/DL   TROPONIN I    Collection Time: 03/10/21 12:29 AM   Result Value Ref Range    Troponin-I, QT <0.02 0.0 - 0.045 NG/ML   NT-PRO BNP    Collection Time: 03/10/21 12:29 AM   Result Value Ref Range    NT pro-BNP 2,050 (H) 0 - 900 PG/ML   EKG, 12 LEAD, SUBSEQUENT    Collection Time: 03/10/21  8:26 AM   Result Value Ref Range    Ventricular Rate 83 BPM    Atrial Rate 83 BPM    P-R Interval 124 ms    QRS Duration 132 ms    Q-T Interval 418 ms    QTC Calculation (Bezet) 491 ms    Calculated P Axis 49 degrees    Calculated R Axis -44 degrees    Calculated T Axis 100 degrees    Diagnosis       Normal sinus rhythm  Left axis deviation  Left bundle branch block  Abnormal ECG  When compared with ECG of 09-MAR-2021 19:45,  premature supraventricular complexes are no longer present  QRS axis shifted left     TROPONIN I    Collection Time: 03/10/21  8:33 AM   Result Value Ref Range    Troponin-I, QT <0.02 0.0 - 0.045 NG/ML       Radiologic Studies -   XR CHEST PORT   Final Result      No acute disease. CT Results  (Last 48 hours)    None        CXR Results  (Last 48 hours)               03/09/21 2104  XR CHEST PORT Final result    Impression:      No acute disease. Narrative:  Portable Frontal Chest.       CLINICAL HISTORY: Short of breath and chest pain for a month. TECHNIQUE: Single frontal view of the chest, obtained portably. COMPARISONS: 3/8/2021. FINDINGS:        Lungs clear. Cardiomediastinal silhouette unremarkable. Vascularity normal. No   effusions. 03/08/21 1849  XR CHEST PORT Final result    Impression:  1. No acute cardiopulmonary process. Narrative:  EXAM: Chest Radiograph       INDICATION:  SOB       TECHNIQUE: AP view of the chest       COMPARISON: 3/5/2021, 2/24/2021, 2/17/2021       FINDINGS: No pneumothorax identified. The lungs are clear. No infiltrates   appreciated. No effusions identified. The cardiomediastinal silhouette is   unremarkable. The pulmonary vasculature is unremarkable. The osseous   structures are unremarkable. Medical Decision Making   I am the first provider for this patient.     I reviewed the vital signs, available nursing notes, past medical history, past surgical history, family history and social history. Vital Signs-Reviewed the patient's vital signs. EKG: Interpreted by myself. New left bundle branch block which has not been present even at rates higher than her current. I have reviewed more than 40 EKGs including many from within the last month. This is the first such occurrence of this. Records Reviewed: Personally, on initial evaluation    MDM:   Patient presents with substernal chest pain. Exam significant for . DDX considered: ACS, unstable angina, pneumothorax, GERD, MSK chest pain, anxiety, CHF  DDX thought to be less likely but also considered due to high risk condition: Aortic dissection, PE, Boerhaave's, pericarditis, mediastinitis    Patient condition on initial evaluation: Seriously ill    Plan:   Pain Control  Close Observation  Cardiac monitoring  As per orders noted below:  Orders Placed This Encounter    XR CHEST PORT    CBC WITH AUTOMATED DIFF    BASIC METABOLIC PANEL    TROPONIN I    PRO-BNP    BLOOD GAS, ARTERIAL    TROPONIN I    CARDIAC MONITORING    EKG, 12 LEAD, INITIAL    EKG, 12 LEAD, SUBSEQUENT    aspirin chewable tablet 324 mg    albuterol-ipratropium (DUO-NEB) 2.5 mg-0.5 mg/3 ml nebulizer solution    albuterol-ipratropium (DUO-NEB) 2.5 MG-0.5 MG/3 ML    INITIAL PHYSICIAN ORDER: OBSERVATION/OUTPATIENT IN A BED OBSERVATION; Telemetry; Chest pain evaluation        HEART Score 6    Management  Scores 0-3: 0.9-1.7% risk of adverse cardiac event. In the HEART Score study, these patients were discharged (0.99% in the retrospective study, 1.7% in the prospective study)  Scores 4-6: 12-16.6% risk of adverse cardiac event. In the HEART Score study, these patients were admitted to the hospital. (11.6% retrospective, 16.6% prospective)  Scores ? 7: 50-65% risk of adverse cardiac event.  In the HEART Score study, these patients were candidates for early invasive measures. (65.2% retrospective, 50.1% prospective)  A MACE (Major Adverse Cardiac Event) was defined as all-cause mortality, myocardial infarction, or coronary revascularization. Critical Actions  Do not use if new ST-segment elevation requiring immediate intervention or clinically unstable patients. A prospective validation of the HEART score for chest pain patients at the emergency department. Susan Martinez, [address] [facility], [place] . NEMO Aguayo A. Mosterd, Erika Delacruz R. Tio, R. Braam, et al.   Int J Cardiol. 2013 Oct 3; 168(3): 33366637. Published online 2013 Mar 7. doi: 10.1016/j.ijcard. 2013.01.255    ED Course:   Work-up as above otherwise negative. Patient hemodynamically stable throughout. Patient was ultimately admitted to the hospitalist and observation status after consulting cardiology. Patient condition at time of disposition: Seriously ill    Admit      Diagnosis     Clinical Impression:   1. Unstable angina pectoris ([address]    2. Left bundle branch block        Signed,  Brijesh Shearer MD  Emergency Physician  DEL[facility]uz    As a voice dictation software was utilized to dictate this note, minor word transpositions can occur. I apologize for confusing wording and typographic errors. Please feel free to contact me for clarification.

## 2021-03-10 NOTE — CONSULTS
[facility]  Pulmonary, Critical Care, and Sleep Medicine    Initial Patient Consult    Name: Ailin Jj MRN: [id number]   : 1965 Hospital: [facility]   Date: 3/10/2021        IMPRESSION:   · Acute on chronic COPD with exacerbation, last FEV1 27% of predicted in . On LTOT 2 LPM per pt. Alpha 1 AT normal  · Chest pain with new LBBB, elevated pro-BNP - Cardiology following  · Hypertension   · Former smoker  · Polysubstance abuse history  · GERD      RECOMMENDATIONS:   · NIPPV support now (for work of breathing) and as needed - initial IPAP/EPAP , titrate settings, including FiO2 to keep SpO2 between 88-92%. If pt is off NIPPV, may have oxygen supplementation with similar target SpO2 88-92%  · Agree with scheduled bronchodilators -- albuterol/ipratropium, LABA/ICS, IV steroids  · Start azithromycin x 3 days for anti-inflammatory effect --hold if QTC > 500  · Continue montelukast  · Watch for SSx of withdrawal  · Once pt is ready for discharge, may go back on Breo ellipta, rescue inhaler with albuterol. Pt would benefit from repeat PFT and follow-up with Pulmonary when ready for discharge. · HTN management per Cardiology  · Prophylaxis per primary team  · Will follow with you. Thank you for consulting     Subjective: This patient has been seen and evaluated at the request of Dr. Alexandra Wang for shortness of breath. Patient is a 63 y.o. female with underlying COPD on home oxygen, with last FEV1 27% predicted in , brought to the ER for persistent and worsening sob. Pt has been frequently visiting the ED for sob (3/8, 3/5, ) and recently admitted for COPD exacerbation with LE cellulitis from -3/1/21. Pt reports she has not been breathing well for several months now and has not been able to do ADLs, including cleaning at home, without feeling sob. Pt states she uses her inhalers with good compliance.  Pt does not follow up with a Pulmonologist. Pt reports she started smoking at the age of 44, consuming anywhere between 6-7 sticks/day. Pt also reports wearing oxygen everyday, all the time, at 6L NC at home. Review of records show pt has a negative alpha-1 antitrypsin in 2015. Past Medical History:   Diagnosis Date    CHF (congestive heart failure) (HCC)     Chronic respiratory failure with hypoxia ([address] 9/7/2020    CKD (chronic kidney disease) stage 3, GFR 30-59 ml/min 10/31/2019    Cocaine abuse ([address] 11/26/2017    COPD (chronic obstructive pulmonary disease) with chronic bronchitis ([address] 12/19/2017    Dependence on supplemental oxygen     Depression 3/22/2020    Drug-seeking behavior 11/19/2016    Endocrine disease     thyroid issues    Essential hypertension 3/10/2017    Fe deficiency anemia 10/27/2012    Fibromyalgia 12/16/2016    Gastroesophageal reflux disease without esophagitis 10/10/2016    Gastrointestinal disorder     \"blockage in my stomach\"    Hypercholesterolemia     Hypertension     Hypertensive heart failure ([address] 12/19/2017    Morbid obesity due to excess calories ([address] 3/10/2017    NSTEMI (non-ST elevated myocardial infarction) ([address] 3/22/2020    On home O2 12/3/2015    Overview:  2L at home per pt for approx 8 years    Polysubstance abuse ([address] 9/15/2018    Respiratory failure ([address] 1/11/2017    S/P hernia repair 10/31/2019    Sleep apnea 12/19/2017    T wave inversion in EKG 3/9/2019    Tobacco use 5/23/1423    Uncomplicated severe persistent asthma 12/13/2016    Vocal cord disease 12/7/2016      Past Surgical History:   Procedure Laterality Date    HX GI      Exploratory laparotomy with lysis of adhesions and primary repair of incarcerated umbilical hernia      Prior to Admission medications    Medication Sig Start Date End Date Taking? Authorizing Provider   oxyCODONE-acetaminophen (Percocet) 5-325 mg per tablet Take 1 Tab by mouth every four (4) hours as needed for Pain for up to 7 days.  Max Daily Amount: 6 Tabs. 3/9/21 3/16/21  Isiah Mares MD   HYDROcodone-acetaminophen (Norco) 5-325 mg per tablet Take 1 Tab by mouth as needed for Pain for up to 3 days. Max Daily Amount: 96 Tabs. 3/8/21 3/11/21  Brianna Jeffers MD   predniSONE (DELTASONE) 10 mg tablet 4 tablets p.o. daily for 3 days then 3 tablets daily for 5 days then 2 tablets p.o. daily for 5 days then 1 tablet p.o. daily 3/1/21   Caitlyn Lopez MD   albuterol (PROVENTIL VENTOLIN) 2.5 mg /3 mL (0.083 %) nebu 1 nebulizer treatment every 4 hours while awake for 1 week then every 4 hours as needed for shortness of breath 3/1/21   Caitlyn Lopez MD   Lactobacillus Acidoph & [facility]) 1 million cell tab tablet Take 2 Tabs by mouth two (2) times a day for 10 days. 3/1/21 3/11/21  Caitlyn Lopez MD   cloNIDine HCL (CATAPRES) 0.2 mg tablet Take 1 Tab by mouth every twelve (12) hours. 3/1/21   Ciatlyn Lopez MD   famotidine (PEPCID) 20 mg tablet Take 1 Tab by mouth nightly. 3/1/21   Caitlyn Lopez MD   losartan (COZAAR) 25 mg tablet Take 1 Tab by mouth daily. Indications: high blood pressure 3/2/21   Caitlyn Lopez MD   Nebulizer & Compressor machine 1 Each by Does Not Apply route as needed for Wheezing or Shortness of Breath. 1/29/21   Montez Granger MD   fluticasone furoate-vilanteroL (Breo Ellipta) 200-25 mcg/dose inhaler Take 1 Puff by inhalation daily. Indications: controller medication for asthma 1/18/21   Casi Camarillo MD   nitroglycerin (NITROSTAT) 0.4 mg SL tablet Take 1 Tab by mouth every five (5) minutes as needed for Chest Pain. Sit/Lay down then put one tab under the tongue every 5 minutes as needed for chest pain for 3 doses 12/31/20   Isiah Mares MD   aspirin delayed-release 81 mg tablet Take 1 Tab by mouth daily. 12/30/20   Isiah Mares MD   montelukast (SINGULAIR) 10 mg tablet Take 1 Tab by mouth nightly.  Indications: controller medication for asthma 12/25/20   Pamella Robbins MD   roflumilast (DALIRESP) 500 mcg tab tablet Take 1 Tab by mouth daily. 20   Karli Kent MD   citalopram (CeleXA) 20 mg tablet Take 1 Tab by mouth every morning. 20   Karli Kent MD   OXYGEN-AIR DELIVERY SYSTEMS 3 L by IntraNASal route as needed for Other (sob). Provider, Angie   atorvastatin (LIPITOR) 20 mg tablet Take 1 Tab by mouth nightly. 3/14/19   Armani Bradshaw MD   naloxone [facility]) 4 mg/actuation nasal spray Use 1 spray intranasally, then discard. Repeat with new spray every 2 min as needed for opioid overdose symptoms, alternating nostrils. 3/14/19   Armani Bradshaw MD     No Known Allergies   Social History     Tobacco Use    Smoking status: Former Smoker     Packs/day: 0.25    Smokeless tobacco: Current User    Tobacco comment: Pt states she has not had money to buy cigarettes in the past month   Substance Use Topics    Alcohol use: No     Comment: socially      No family history on file. No current facility-administered medications for this encounter. Review of Systems:  A comprehensive review of systems was negative except for that written in the HPI. Objective:   Vital Signs:    Visit Vitals  BP (!) 173/80   Pulse 96   Temp 98.5 °F (36.9 °C)   Resp 18   Ht 4' 11\" (1.499 m)   Wt 63.5 kg (140 lb)   LMP 2009   SpO2 99%   BMI 28.28 kg/m²       O2 Device: Nasal cannula   O2 Flow Rate (L/min): 4.5 l/min   Temp (24hrs), Av.5 °F (36.9 °C), Min:98.5 °F (36.9 °C), Max:98.5 °F (36.9 °C)       Intake/Output:   Last shift:      No intake/output data recorded. Last 3 shifts: No intake/output data recorded. No intake or output data in the 24 hours ending 03/10/21 1244     Physical Exam:   General:  Alert, cooperative, visible distress with tripod position with purse-lip breathing   Head:  Normocephalic, without obvious abnormality, atraumatic. Eyes:  Conjunctivae/corneas clear. PERRL, EOMs intact. Nose: Nares normal.  No drainage or sinus tenderness.    Throat: Lips, mucosa, and tongue normal. Teeth and gums normal.   Neck: Supple, symmetrical, trachea midline, no adenopathy, thyroid: no enlargment/tenderness/nodules, no carotid bruit and no JVD. Back:   Symmetric    Lungs:   Tight air entry with diffuse expiratory wheezing throughout   Chest wall:  Symmetrical chest expansion. No tenderness or deformity. + accessory muscle use with supraclavicular retraction   Heart:  Regular rate and rhythm, S1, S2 normal, no murmur, click, rub or gallop. Abdomen:   Soft, non-tender. Bowel sounds normal. No masses,  No organomegaly. Extremities: Extremities normal, atraumatic, no cyanosis; + b/l LE non-pitting edema. Pulses: 2+ and symmetric all extremities.    Skin: + hyperesthetia to b/l LEs   Lymph nodes: Cervical, supraclavicular, and axillary nodes normal.   Neurologic: Grossly nonfocal       Data review:     Recent Results (from the past 24 hour(s))   EKG, 12 LEAD, INITIAL    Collection Time: 03/09/21  7:45 PM   Result Value Ref Range    Ventricular Rate 105 BPM    Atrial Rate 105 BPM    P-R Interval 116 ms    QRS Duration 126 ms    Q-T Interval 400 ms    QTC Calculation (Bezet) 528 ms    Calculated P Axis 74 degrees    Calculated R Axis 46 degrees    Calculated T Axis 94 degrees    Diagnosis       Sinus tachycardia with premature supraventricular complexes  Possible Left atrial enlargement  Left bundle branch block  Abnormal ECG  When compared with ECG of 08-MAR-2021 18:32,  premature supraventricular complexes are now present  Left bundle branch block is now present  Criteria for Anteroseptal infarct are no longer present     CBC WITH AUTOMATED DIFF    Collection Time: 03/10/21 12:29 AM   Result Value Ref Range    WBC 10.0 4.6 - 13.2 K/uL    RBC 3.73 (L) 4.20 - 5.30 M/uL    HGB 10.5 (L) 12.0 - 16.0 g/dL    HCT 33.6 (L) 35.0 - 45.0 %    MCV 90.1 74.0 - 97.0 FL    MCH 28.2 24.0 - 34.0 PG    MCHC 31.3 31.0 - 37.0 g/dL    RDW 14.0 11.6 - 14.5 %    PLATELET 358 478 - 179 K/uL    MPV 10.2 9.2 - 11.8 FL    NEUTROPHILS 73 40 - 73 %    LYMPHOCYTES 20 (L) 21 - 52 %    MONOCYTES 5 3 - 10 %    EOSINOPHILS 2 0 - 5 %    BASOPHILS 0 0 - 2 %    ABS. NEUTROPHILS 7.3 1.8 - 8.0 K/UL    ABS. LYMPHOCYTES 2.0 0.9 - 3.6 K/UL    ABS. MONOCYTES 0.5 0.05 - 1.2 K/UL    ABS. EOSINOPHILS 0.2 0.0 - 0.4 K/UL    ABS. BASOPHILS 0.0 0.0 - 0.1 K/UL    DF AUTOMATED     METABOLIC PANEL, BASIC    Collection Time: 03/10/21 12:29 AM   Result Value Ref Range    Sodium 142 136 - 145 mmol/L    Potassium 3.6 3.5 - 5.5 mmol/L    Chloride 105 100 - 111 mmol/L    CO2 33 (H) 21 - 32 mmol/L    Anion gap 4 3.0 - 18 mmol/L    Glucose 106 (H) 74 - 99 mg/dL    BUN 12 7.0 - 18 MG/DL    Creatinine 0.84 0.6 - 1.3 MG/DL    BUN/Creatinine ratio 14 12 - 20      GFR est AA >60 >60 ml/min/1.73m2    GFR est non-AA >60 >60 ml/min/1.73m2    Calcium 8.4 (L) 8.5 - 10.1 MG/DL   TROPONIN I    Collection Time: 03/10/21 12:29 AM   Result Value Ref Range    Troponin-I, QT <0.02 0.0 - 0.045 NG/ML   NT-PRO BNP    Collection Time: 03/10/21 12:29 AM   Result Value Ref Range    NT pro-BNP 2,050 (H) 0 - 900 PG/ML   EKG, 12 LEAD, SUBSEQUENT    Collection Time: 03/10/21  8:26 AM   Result Value Ref Range    Ventricular Rate 83 BPM    Atrial Rate 83 BPM    P-R Interval 124 ms    QRS Duration 132 ms    Q-T Interval 418 ms    QTC Calculation (Bezet) 491 ms    Calculated P Axis 49 degrees    Calculated R Axis -44 degrees    Calculated T Axis 100 degrees    Diagnosis       Normal sinus rhythm  Left axis deviation  Left bundle branch block  Abnormal ECG  When compared with ECG of 09-MAR-2021 19:45,  premature supraventricular complexes are no longer present  QRS axis shifted left     TROPONIN I    Collection Time: 03/10/21  8:33 AM   Result Value Ref Range    Troponin-I, QT <0.02 0.0 - 0.045 NG/ML   ECHO ADULT FOLLOW-UP OR LIMITED    Collection Time: 03/10/21 10:44 AM   Result Value Ref Range    IVSd 1.41 (A) 0.60 - 0.90 cm    LVIDd 3.68 (A) 3.90 - 5.30 cm    LVIDs 2.33 cm    LVPWd 1.51 (A)  0.60 - 0.90 cm    RVIDd 2.66 cm    LV Mass .3 67.0 - 162.0 g    LV Mass AL Index 125.2 43.0 - 95.0 g/m2       Imaging:  I have personally reviewed the patients radiographs and have reviewed the reports:  CXR Results  (Last 48 hours)               03/09/21 2104  XR CHEST PORT Final result    Impression:      No acute disease. Narrative:  Portable Frontal Chest.       CLINICAL HISTORY: Short of breath and chest pain for a month. TECHNIQUE: Single frontal view of the chest, obtained portably. COMPARISONS: 3/8/2021. FINDINGS:        Lungs clear. Cardiomediastinal silhouette unremarkable. Vascularity normal. No   effusions. 03/08/21 1849  XR CHEST PORT Final result    Impression:  1. No acute cardiopulmonary process. Narrative:  EXAM: Chest Radiograph       INDICATION:  SOB       TECHNIQUE: AP view of the chest       COMPARISON: 3/5/2021, 2/24/2021, 2/17/2021       FINDINGS: No pneumothorax identified. The lungs are clear. No infiltrates   appreciated. No effusions identified. The cardiomediastinal silhouette is   unremarkable. The pulmonary vasculature is unremarkable. The osseous   structures are unremarkable.                        Efraín Acosta MD

## 2021-03-11 LAB
ATRIAL RATE: 81 BPM
ATRIAL RATE: 87 BPM
CALCULATED P AXIS, ECG09: 58 DEGREES
CALCULATED P AXIS, ECG09: 64 DEGREES
CALCULATED R AXIS, ECG10: 48 DEGREES
CALCULATED R AXIS, ECG10: 49 DEGREES
CALCULATED T AXIS, ECG11: 141 DEGREES
CALCULATED T AXIS, ECG11: 67 DEGREES
DIAGNOSIS, 93000: NORMAL
DIAGNOSIS, 93000: NORMAL
P-R INTERVAL, ECG05: 124 MS
P-R INTERVAL, ECG05: 136 MS
Q-T INTERVAL, ECG07: 376 MS
Q-T INTERVAL, ECG07: 378 MS
QRS DURATION, ECG06: 86 MS
QRS DURATION, ECG06: 88 MS
QTC CALCULATION (BEZET), ECG08: 436 MS
QTC CALCULATION (BEZET), ECG08: 454 MS
VENTRICULAR RATE, ECG03: 81 BPM
VENTRICULAR RATE, ECG03: 87 BPM

## 2021-03-11 PROCEDURE — 99233 SBSQ HOSP IP/OBS HIGH 50: CPT | Performed by: INTERNAL MEDICINE

## 2021-03-11 PROCEDURE — 74011250636 HC RX REV CODE- 250/636: Performed by: INTERNAL MEDICINE

## 2021-03-11 PROCEDURE — 74011000250 HC RX REV CODE- 250: Performed by: INTERNAL MEDICINE

## 2021-03-11 PROCEDURE — 74011250637 HC RX REV CODE- 250/637: Performed by: INTERNAL MEDICINE

## 2021-03-11 PROCEDURE — 94660 CPAP INITIATION&MGMT: CPT

## 2021-03-11 PROCEDURE — 99218 HC RM OBSERVATION: CPT

## 2021-03-11 PROCEDURE — 2709999900 HC NON-CHARGEABLE SUPPLY

## 2021-03-11 PROCEDURE — 74011250636 HC RX REV CODE- 250/636: Performed by: PHYSICIAN ASSISTANT

## 2021-03-11 PROCEDURE — 77030038269 HC DRN EXT URIN PURWCK BARD -A

## 2021-03-11 PROCEDURE — 93005 ELECTROCARDIOGRAM TRACING: CPT

## 2021-03-11 PROCEDURE — 96376 TX/PRO/DX INJ SAME DRUG ADON: CPT

## 2021-03-11 RX ORDER — OXYCODONE AND ACETAMINOPHEN 5; 325 MG/1; MG/1
1 TABLET ORAL ONCE
Status: COMPLETED | OUTPATIENT
Start: 2021-03-11 | End: 2021-03-11

## 2021-03-11 RX ORDER — OXYCODONE AND ACETAMINOPHEN 5; 325 MG/1; MG/1
1 TABLET ORAL ONCE
Status: DISCONTINUED | OUTPATIENT
Start: 2021-03-11 | End: 2021-03-11

## 2021-03-11 RX ADMIN — IPRATROPIUM BROMIDE AND ALBUTEROL SULFATE 3 ML: .5; 3 SOLUTION RESPIRATORY (INHALATION) at 19:15

## 2021-03-11 RX ADMIN — OXYCODONE HYDROCHLORIDE AND ACETAMINOPHEN 1 TABLET: 5; 325 TABLET ORAL at 09:50

## 2021-03-11 RX ADMIN — CLONIDINE HYDROCHLORIDE 0.2 MG: 0.1 TABLET ORAL at 13:10

## 2021-03-11 RX ADMIN — MONTELUKAST SODIUM 10 MG: 10 TABLET, COATED ORAL at 21:30

## 2021-03-11 RX ADMIN — METHYLPREDNISOLONE SODIUM SUCCINATE 60 MG: 40 INJECTION, POWDER, FOR SOLUTION INTRAMUSCULAR; INTRAVENOUS at 13:30

## 2021-03-11 RX ADMIN — CLONIDINE HYDROCHLORIDE 0.2 MG: 0.1 TABLET ORAL at 20:12

## 2021-03-11 RX ADMIN — Medication 10 ML: at 16:30

## 2021-03-11 RX ADMIN — IPRATROPIUM BROMIDE AND ALBUTEROL SULFATE 3 ML: .5; 3 SOLUTION RESPIRATORY (INHALATION) at 06:23

## 2021-03-11 RX ADMIN — Medication 10 ML: at 21:31

## 2021-03-11 RX ADMIN — IPRATROPIUM BROMIDE AND ALBUTEROL SULFATE 3 ML: .5; 3 SOLUTION RESPIRATORY (INHALATION) at 00:00

## 2021-03-11 RX ADMIN — Medication 81 MG: at 10:00

## 2021-03-11 RX ADMIN — METHYLPREDNISOLONE SODIUM SUCCINATE 60 MG: 40 INJECTION, POWDER, FOR SOLUTION INTRAMUSCULAR; INTRAVENOUS at 21:31

## 2021-03-11 RX ADMIN — AZITHROMYCIN DIHYDRATE 500 MG: 500 INJECTION, POWDER, LYOPHILIZED, FOR SOLUTION INTRAVENOUS at 16:31

## 2021-03-11 RX ADMIN — Medication 40 ML: at 06:00

## 2021-03-11 RX ADMIN — ATORVASTATIN CALCIUM 20 MG: 20 TABLET, FILM COATED ORAL at 21:30

## 2021-03-11 RX ADMIN — METHYLPREDNISOLONE SODIUM SUCCINATE 60 MG: 40 INJECTION, POWDER, FOR SOLUTION INTRAMUSCULAR; INTRAVENOUS at 06:00

## 2021-03-11 NOTE — ED NOTES
Report received from outgoing nurse. Pt is resting in bed comfortably. Pt sated she took off BIPAP to eat but will put it back on. No signs of acute distress noted. Symmetrical rise and fall of chest. Pt voices no complaints of pain. Call light in reach, will continue to monitor.

## 2021-03-11 NOTE — ROUTINE PROCESS
1110 Patient arrives on floor yelling and swearing at nursing staff refusing to allow vitals to be done 
0700 Patient allows vitals to be done by Charge Nurse and for charge nurse to place her on room oxygen 0710 patient request a [place] patient  Educated on the need to get out of bed and us the bedside commode patient stated she is to short of breath to get out of bed O2 saturation 98 % on 5LPM  Patient also states she is in 10/10 pain 0730 Patient continues to yell and scream about pain medication Patient has thrown coffee into the hallway and is very agitated and continues to swear at the nurses Patient is refusing to wear Telemetry box 6396 Patient has refused breathing treatment 
0900 patient eating  
0930 patient swearing and yelling at nurse 1442 Patient requested breathing treatment;  Breathing given

## 2021-03-11 NOTE — PROGRESS NOTES
[facility]  Pulmonary, Critical Care, and Sleep Medicine    Progress Note    Name: Yolande Colmenares MRN: [id number]   : 1965 Hospital: [facility]   Date: 3/11/2021        IMPRESSION:   · Acute on chronic COPD with exacerbation, last FEV1 27% of predicted in . On LTOT 2 LPM per pt. Alpha 1 AT normal  · Chest pain with new LBBB, elevated pro-BNP - Cardiology following  · Hypertension   · Former smoker  · Polysubstance abuse history  · GERD      RECOMMENDATIONS:   · Continue oxygen supplementation to maintain SaO2 more than 88% preferably around 92%  · Agree with scheduled bronchodilators -- albuterol/ipratropium, LABA/ICS, IV steroids  · Start azithromycin x 3 days for anti-inflammatory effect --hold if QTC > 500  · Continue montelukast  · Watch for SSx of withdrawal  · Once pt is ready for discharge, may go back on Breo ellipta, rescue inhaler with albuterol. Pt would benefit from repeat PFT and follow-up with Pulmonary when ready for discharge. · HTN management per Cardiology  · Prophylaxis per primary team  · Will follow with you. Subjective:     21   Patient is laying in bed talking to the   Denies any new complaints-continues with cough, shortness of breath and wheezing  States that she never got placed on BiPAP  She is currently on nasal cannula  Denies fever chills  No nausea    HPI  This patient has been seen and evaluated at the request of Dr. Dana Banks for shortness of breath. Patient is a 63 y.o. female with underlying COPD on home oxygen, with last FEV1 27% predicted in , brought to the ER for persistent and worsening sob. Pt has been frequently visiting the ED for sob (3/8, 3/5, ) and recently admitted for COPD exacerbation with LE cellulitis from -3/1/21. Pt reports she has not been breathing well for several months now and has not been able to do ADLs, including cleaning at home, without feeling sob.  Pt states she uses her inhalers with good compliance. Pt does not follow up with a Pulmonologist. Pt reports she started smoking at the age of 44, consuming anywhere between 6-7 sticks/day. Pt also reports wearing oxygen everyday, all the time, at 6L NC at home. Review of records show pt has a negative alpha-1 antitrypsin in 2015.       Past Medical History:   Diagnosis Date    CHF (congestive heart failure) (HCC)     Chronic respiratory failure with hypoxia ([address] 9/7/2020    CKD (chronic kidney disease) stage 3, GFR 30-59 ml/min 10/31/2019    Cocaine abuse ([address] 11/26/2017    COPD (chronic obstructive pulmonary disease) with chronic bronchitis ([address] 12/19/2017    Dependence on supplemental oxygen     Depression 3/22/2020    Drug-seeking behavior 11/19/2016    Endocrine disease     thyroid issues    Essential hypertension 3/10/2017    Fe deficiency anemia 10/27/2012    Fibromyalgia 12/16/2016    Gastroesophageal reflux disease without esophagitis 10/10/2016    Gastrointestinal disorder     \"blockage in my stomach\"    Hypercholesterolemia     Hypertension     Hypertensive heart failure ([address] 12/19/2017    Morbid obesity due to excess calories ([address] 3/10/2017    NSTEMI (non-ST elevated myocardial infarction) ([address] 3/22/2020    On home O2 12/3/2015    Overview:  2L at home per pt for approx 8 years    Polysubstance abuse ([address] 9/15/2018    Respiratory failure ([address] 1/11/2017    S/P hernia repair 10/31/2019    Sleep apnea 12/19/2017    T wave inversion in EKG 3/9/2019    Tobacco use 4/53/1393    Uncomplicated severe persistent asthma 12/13/2016    Vocal cord disease 12/7/2016      Past Surgical History:   Procedure Laterality Date    HX GI      Exploratory laparotomy with lysis of adhesions and primary repair of incarcerated umbilical hernia      No Known Allergies     Current Facility-Administered Medications   Medication Dose Route Frequency    influenza vaccine 2020-21 (6 mos+)(PF) (FLUARIX/FLULAVAL/FLUZONE QUAD) injection 0.5 mL  0.5 mL IntraMUSCular ONCE    aspirin delayed-release tablet 81 mg  81 mg Oral DAILY    atorvastatin (LIPITOR) tablet 20 mg  20 mg Oral QHS    [Held by provider] citalopram (CELEXA) tablet 20 mg  20 mg Oral 7am    cloNIDine HCL (CATAPRES) tablet 0.2 mg  0.2 mg Oral Q12H    arformoterol 15 mcg/budesonide 0.5 mg neb solution   Nebulization BID    losartan (COZAAR) tablet 25 mg  25 mg Oral DAILY    montelukast (SINGULAIR) tablet 10 mg  10 mg Oral QHS    sodium chloride (NS) flush 5-40 mL  5-40 mL IntraVENous Q8H    enoxaparin (LOVENOX) injection 40 mg  40 mg SubCUTAneous DAILY    albuterol-ipratropium (DUO-NEB) 2.5 MG-0.5 MG/3 ML  3 mL Nebulization Q4H RT    methylPREDNISolone (PF) (SOLU-MEDROL) injection 60 mg  60 mg IntraVENous Q8H    azithromycin (ZITHROMAX) 500 mg in 0.9% sodium chloride 250 mL (VIAL-MATE)  500 mg IntraVENous Q24H       Review of Systems:  A comprehensive review of systems was negative except for that written in the HPI. Objective:   Vital Signs:    Visit Vitals  BP (!) 174/84 (BP 1 Location: Right upper arm, BP Patient Position: Reclining)   Pulse 90   Temp 97.1 °F (36.2 °C)   Resp 18   Ht 4' 11\" (1.499 m)   Wt 63.5 kg (140 lb)   LMP 2009   SpO2 98%   BMI 28.28 kg/m²       O2 Device: BIPAP   O2 Flow Rate (L/min): 4.5 l/min   Temp (24hrs), Av.8 °F (36.6 °C), Min:97.1 °F (36.2 °C), Max:98.2 °F (36.8 °C)       Intake/Output:   Last shift:      No intake/output data recorded. Last 3 shifts: No intake/output data recorded. No intake or output data in the 24 hours ending 21 1149     Physical Exam:   General:  Alert, cooperative, comfortable, speaking in long sentences   Head:  Normocephalic, without obvious abnormality, atraumatic. Eyes:  Conjunctivae/corneas clear. PERRL, EOMs intact. Nose: Nares normal.  No drainage or sinus tenderness.    Throat: Lips, mucosa, and tongue normal. Teeth and gums normal.   Neck: Supple, symmetrical, trachea midline, no adenopathy, thyroid: no enlargment/tenderness/nodules, no carotid bruit and no JVD. Back:   Symmetric    Lungs:    diffuse expiratory wheezing throughout   Chest wall:  Symmetrical chest expansion. No tenderness or deformity. Heart:  Regular rate and rhythm, S1, S2 normal, no murmur, click, rub or gallop. Abdomen:   Soft, non-tender. Bowel sounds normal. No masses,  No organomegaly. Extremities: Extremities normal, atraumatic, no cyanosis; + b/l LE non-pitting edema. Pulses: 2+ and symmetric all extremities. Skin: + hyperesthetia to b/l LEs   Lymph nodes: Cervical, supraclavicular, and axillary nodes normal.   Neurologic: Grossly nonfocal       Data review:     Recent Results (from the past 24 hour(s))   EKG, 12 LEAD, SUBSEQUENT    Collection Time: 03/11/21  7:48 AM   Result Value Ref Range    Ventricular Rate 81 BPM    Atrial Rate 81 BPM    P-R Interval 124 ms    QRS Duration 88 ms    Q-T Interval 376 ms    QTC Calculation (Bezet) 436 ms    Calculated P Axis 64 degrees    Calculated R Axis 48 degrees    Calculated T Axis 141 degrees    Diagnosis       Normal sinus rhythm  Moderate voltage criteria for LVH, may be normal variant  T wave abnormality, consider lateral ischemia  Abnormal ECG  When compared with ECG of 10-MAR-2021 08:26,  Left bundle branch block is no longer present         Imaging:  I have personally reviewed the patients radiographs and have reviewed the reports:  CXR Results  (Last 48 hours)               03/09/21 2104  XR CHEST PORT Final result    Impression:      No acute disease. Narrative:  Portable Frontal Chest.       CLINICAL HISTORY: Short of breath and chest pain for a month. TECHNIQUE: Single frontal view of the chest, obtained portably. COMPARISONS: 3/8/2021. FINDINGS:        Lungs clear. Cardiomediastinal silhouette unremarkable. Vascularity normal. No   effusions.                   High complexity decision making was performed during the evaluation of this patient at high risk for decompensation with multiple organ involvement     Above mentioned total time spent on reviewing the case/medical record/data/notes/EMR/patient examination/documentation/coordinating care with nurse/consultants, exclusive of procedures with complex decision making performed and > 50% time spent in face to face evaluation.      Zafar Jacinto MD

## 2021-03-11 NOTE — ED NOTES
TRANSFER - OUT REPORT:    Verbal report given to Penny Cesar RN(name) on Leanna Gordon  being transferred to [address] [place] [address](unit) for routine progression of care       Report consisted of patients Situation, Background, Assessment and   Recommendations(SBAR). Information from the following report(s) SBAR, ED Summary and MAR was reviewed with the receiving nurse. Lines:   Peripheral IV 03/10/21 Right Hand (Active)   Site Assessment Clean, dry, & intact 03/10/21 0031   Phlebitis Assessment 0 03/10/21 0031   Infiltration Assessment 0 03/10/21 0031   Dressing Status Clean, dry, & intact 03/10/21 0031   Dressing Type Transparent 03/10/21 0031       Peripheral IV 03/10/21 Left;Mid Arm (Active)   Site Assessment Clean, dry, & intact 03/10/21 2123   Phlebitis Assessment 0 03/10/21 2123   Infiltration Assessment 0 03/10/21 2123   Dressing Status Clean, dry, & intact 03/10/21 2123   Dressing Type Transparent 03/10/21 2123   Hub Color/Line Status Pink;Patent; Flushed 03/10/21 2123        Opportunity for questions and clarification was provided.       Patient transported with:   O2 @ 3 liters

## 2021-03-11 NOTE — ED NOTES
Pt resting in bed with eyes closed. No acute distress noted. Symmetrical rise and fall of chest. Call light in reach.

## 2021-03-11 NOTE — PROGRESS NOTES
Nutrition Note    Received call from dining associate, pt upset about being on a cardiac diet & asking for cheeseburger. Reason for cardiac diet order explained to patient & discussed pt dinner order with kitchen staff. Pt agreeable to continuing cardiac diet at this time.     Electronically signed by Radha Carney RD on 3/11/2021 at 1:24 PM    Contact: [phone]

## 2021-03-11 NOTE — PROGRESS NOTES
0700 pt on stretcher yelling and cursing as being admitted to floor. [id number] Pt yelling for pain meds. MD paged    [phone] still continues to yell and curse    0730 pt continues cusing with the screaming and yelling. Refusing to let staff do v/s or talk. [id number] paged again     0915 Pt throws her large cup of Hot coffee at the staff in the hallway because she cant have pain medication. 2294 pt refused am meds and also the Flu shot and lovenox injection. 1045 pt continues to curse and yell at the staff    1230  Have see no changes in behavior    1400 patient banging her room phone and call light controller against her bedside table. 0 Pt yelling and cursing at staff due her friends and family not wanting to pick her up because she is screaming and cursing them too.    1645 pt seems to be resting quietly. 1830 Pt wanting ice cream, explained we did not have any and kitchen was closed. Pt did not like this and continued with the screaming and cursing. [id number] Pt leaving unit to transfer to 5S after [place] with the supervisor because she says that no one talked to her about moving.

## 2021-03-11 NOTE — PROGRESS NOTES
conducted an initial consultation and Spiritual Assessment for Cindy Selby, who is a 63 y.o.,female. Patient's Primary Language is: [place]. According to the patient's EMR Scientologist Affiliation is: [place].      The reason the Patient came to the hospital is:   Patient Active Problem List    Diagnosis Date Noted    Chest pain at rest 03/10/2021    Hypokalemia 02/26/2021    Bilateral lower leg cellulitis 02/26/2021    Long QT interval 01/08/2021    CHF (congestive heart failure) ([address] 12/23/2020    Acute bronchitis with chronic obstructive pulmonary disease (COPD) ([address] 11/28/2020    Asthma exacerbation 11/12/2020    COPD (chronic obstructive pulmonary disease) ([address] 11/12/2020    UTI (urinary tract infection) 10/08/2020    Atypical chest pain 10/08/2020    DEMARCUS (acute kidney injury) ([address] 10/08/2020    Acute exacerbation of chronic obstructive pulmonary disease (COPD) ([address] 09/14/2020    COPD with acute exacerbation ([address] 09/07/2020    Chronic respiratory failure with hypoxia ([address] 09/07/2020    Acute bronchitis 09/07/2020    Suspected COVID-19 virus infection 09/07/2020    Tachycardia 09/07/2020    Normocytic anemia 09/07/2020    Acute respiratory distress 09/07/2020    COPD exacerbation ([address] 09/07/2020    Depression 03/22/2020    S/P hernia repair 10/31/2019    CKD (chronic kidney disease) stage 3, GFR 30-59 ml/min 10/31/2019    Tobacco use 09/28/2019    T wave inversion in EKG 03/09/2019    HLD (hyperlipidemia) 09/15/2018    Polysubstance abuse ([address] 09/15/2018    Hypertensive heart failure ([address] 12/19/2017    Sleep apnea 12/19/2017    COPD (chronic obstructive pulmonary disease) with chronic bronchitis ([address] 12/19/2017    Cocaine abuse ([address] 11/26/2017    Essential hypertension 03/10/2017    Morbid obesity due to excess calories ([address] 03/10/2017    Acute exacerbation of COPD with asthma ([address] 03/02/2017    Respiratory failure ([facility] 01/11/2017    Fibromyalgia 17/05/5959    Uncomplicated severe persistent asthma 12/13/2016    Vocal cord disease 12/07/2016    Drug-seeking behavior 11/19/2016    Gastroesophageal reflux disease without esophagitis 10/10/2016    Thyroid goiter 06/30/2016    Asthma 03/05/2016    On home O2 12/03/2015    Abnormal CT of the chest 11/20/2015    MDRO (multiple drug resistant organisms) resistance 11/08/2012    Fe deficiency anemia 10/27/2012        The  provided the following Interventions:  Initiated a relationship of care and support. Explored issues of marilee, belief, spirituality and Islam/ritual needs while hospitalized. Listened empathically. Provided chaplaincy education. Provided information about Spiritual Care Services. Offered prayer and assurance of continued prayers on patient's behalf. Chart reviewed. The following outcomes where achieved:  Patient shared limited information about both their medical narrative and spiritual journey/beliefs.  confirmed Patient's Yazidi Affiliation. Patient processed feeling about current hospitalization. Patient expressed gratitude for 's visit. Assessment:  Patient does not have any Islam/cultural needs that will affect patient's preferences in health care. There are no spiritual or Islam issues which require intervention at this time. Plan:  Chaplains will continue to follow and will provide pastoral care on an as needed/requested basis.  recommends bedside caregivers page  on duty if patient shows signs of acute spiritual or emotional distress.     [address]   [phone]

## 2021-03-11 NOTE — ED NOTES
Pt resting in bed with eyes closed. No acute distress noted. Symmetrical rise and fall of chest. Call light in reach. Will continue to monitor.

## 2021-03-11 NOTE — ED NOTES
Pt demanding morphine or percocet. Pt states nurse is not taking her pain seriously. Dr. Norma Matias was previously consulted regarding pain.

## 2021-03-11 NOTE — ED NOTES
Pt placed back on BIPAP. Pt resting in bed with eyes closed. No acute distress noted. Symmetrical rise and fall of chest. Call light in reach.

## 2021-03-12 VITALS
SYSTOLIC BLOOD PRESSURE: 142 MMHG | OXYGEN SATURATION: 100 % | TEMPERATURE: 97.5 F | HEIGHT: 59 IN | RESPIRATION RATE: 24 BRPM | HEART RATE: 81 BPM | BODY MASS INDEX: 28.43 KG/M2 | WEIGHT: 141 LBS | DIASTOLIC BLOOD PRESSURE: 69 MMHG

## 2021-03-12 LAB
ATRIAL RATE: 89 BPM
CALCULATED P AXIS, ECG09: 70 DEGREES
CALCULATED R AXIS, ECG10: 66 DEGREES
CALCULATED T AXIS, ECG11: -116 DEGREES
DIAGNOSIS, 93000: NORMAL
P-R INTERVAL, ECG05: 140 MS
Q-T INTERVAL, ECG07: 364 MS
QRS DURATION, ECG06: 80 MS
QTC CALCULATION (BEZET), ECG08: 442 MS
VENTRICULAR RATE, ECG03: 89 BPM

## 2021-03-12 PROCEDURE — 99218 HC RM OBSERVATION: CPT

## 2021-03-12 PROCEDURE — 74011250637 HC RX REV CODE- 250/637: Performed by: INTERNAL MEDICINE

## 2021-03-12 PROCEDURE — 74011250636 HC RX REV CODE- 250/636: Performed by: INTERNAL MEDICINE

## 2021-03-12 PROCEDURE — 65270000029 HC RM PRIVATE

## 2021-03-12 PROCEDURE — 77010033678 HC OXYGEN DAILY

## 2021-03-12 PROCEDURE — 93005 ELECTROCARDIOGRAM TRACING: CPT

## 2021-03-12 PROCEDURE — 74011000250 HC RX REV CODE- 250: Performed by: INTERNAL MEDICINE

## 2021-03-12 PROCEDURE — 96376 TX/PRO/DX INJ SAME DRUG ADON: CPT

## 2021-03-12 RX ADMIN — METHYLPREDNISOLONE SODIUM SUCCINATE 40 MG: 40 INJECTION, POWDER, FOR SOLUTION INTRAMUSCULAR; INTRAVENOUS at 13:17

## 2021-03-12 RX ADMIN — Medication 81 MG: at 09:43

## 2021-03-12 RX ADMIN — IPRATROPIUM BROMIDE AND ALBUTEROL SULFATE 3 ML: .5; 3 SOLUTION RESPIRATORY (INHALATION) at 13:17

## 2021-03-12 RX ADMIN — LOSARTAN POTASSIUM 25 MG: 25 TABLET ORAL at 09:43

## 2021-03-12 RX ADMIN — CLONIDINE HYDROCHLORIDE 0.2 MG: 0.1 TABLET ORAL at 09:45

## 2021-03-12 RX ADMIN — METHYLPREDNISOLONE SODIUM SUCCINATE 60 MG: 40 INJECTION, POWDER, FOR SOLUTION INTRAMUSCULAR; INTRAVENOUS at 05:23

## 2021-03-12 RX ADMIN — IPRATROPIUM BROMIDE AND ALBUTEROL SULFATE 3 ML: .5; 3 SOLUTION RESPIRATORY (INHALATION) at 06:53

## 2021-03-12 RX ADMIN — Medication 10 ML: at 05:24

## 2021-03-12 NOTE — PROGRESS NOTES
[facility] states [facility] pending.  Will follow up   Hospitalist Progress Note             Date of Service:  3/12/2021  NAME:  Yolande Colmenares  :  1965  MRN:  [id number]    Admission Summary:   Admitted on 3/9 with sob and cp     Interval history / Subjective:   Cardiology has no further plans for cardiac workup      Assessment & Plan:      COPD exacerbation  - cont arformoterol nebs, solumedrol iv, singulair, iv zithromycin, duonebs     Chest pian  - ef preserved, no trop elevation, new LBBB evaluated by cardiology, no further workup indicated at this time    Pt still has significant abnormal lung sounds, as well as increased work of breathing at time. Unclear source of agitation/anger is unfortunately hindering her treatment plan, she seems unwilling to do 6 min walk on her self reported 5 liters home 02. Says she is too short of breath, but her pulse ox is currently 98%. She is requesting narcotics for generalized pian, she is not enrolled in pain mgmt, or on chronic home meds. I have explained that copd execerbation is not treated with narcotics. Hospital Problems  Date Reviewed: 10/23/2019          Codes Class Noted POA    Chest pain at rest ICD-10-CM: R07.9  ICD-9-CM: 786.50  3/10/2021 Unknown                Review of Systems:   A comprehensive review of systems was negative except for that written in the HPI. Vital Signs:    Last 24hrs VS reviewed since prior progress note. Most recent are:  Visit Vitals  BP (!) 142/69   Pulse 81   Temp 97.5 °F (36.4 °C)   Resp 24   Ht 4' 11\" (1.499 m)   Wt 64 kg (141 lb)   SpO2 100%   BMI 28.48 kg/m²         Intake/Output Summary (Last 24 hours) at 3/12/2021 1354  Last data filed at 3/12/2021 0413  Gross per 24 hour   Intake 1700 ml   Output 0 ml   Net 1700 ml        Physical Examination:             General:          Alert, cooperative, no distress, appears stated age.      HEENT:           Atraumatic, anicteric sclerae, pink conjunctivae                          No oral ulcers, mucosa moist, throat clear, dentition fair  Neck:               Supple, symmetrical  Lungs:             + wheeze, increased work of breathing  Chest wall:      No tenderness  No Accessory muscle use. Heart:              Regular  rhythm,  No  murmur   No edema  Abdomen:        Soft, non-tender. Not distended. Bowel sounds normal  Extremities:     No cyanosis. No clubbing,                            Skin turgor normal, Capillary refill normal  Skin:                Not pale. Not Jaundiced  No rashes   Psych:             Not anxious or agitated. Neurologic:      Alert, moves all extremities, answers questions appropriately and responds to commands        Data Review:    Review and/or order of clinical lab test  Review and/or order of tests in the radiology section of CPT  Review and/or order of tests in the medicine section of CPT      Labs:     Recent Labs     03/10/21  0029   WBC 10.0   HGB 10.5*   HCT 33.6*        Recent Labs     03/10/21  0029      K 3.6      CO2 33*   BUN 12   CREA 0.84   *   CA 8.4*     No results for input(s): ALT, AP, TBIL, TBILI, TP, ALB, GLOB, GGT, AML, LPSE in the last 72 hours. No lab exists for component: SGOT, GPT, AMYP, HLPSE  No results for input(s): INR, PTP, APTT, INREXT in the last 72 hours. No results for input(s): FE, TIBC, PSAT, FERR in the last 72 hours. No results found for: FOL, RBCF   No results for input(s): PH, PCO2, PO2 in the last 72 hours.   Recent Labs     03/10/21  0833 03/10/21  0029   TROIQ <0.02 <0.02     No results found for: CHOL, CHOLX, CHLST, CHOLV, HDL, HDLP, LDL, LDLC, DLDLP, TGLX, TRIGL, TRIGP, CHHD, CHHDX  Lab Results   Component Value Date/Time    Glucose (POC) 207 (H) 11/30/2020 09:39 PM    Glucose (POC) 122 (H) 11/30/2020 05:03 PM    Glucose (POC) 145 (H) 11/30/2020 01:24 PM    Glucose (POC) 118 (H) 11/30/2020 09:11 AM    Glucose (POC) 159 (H) 11/29/2020 05:35 PM    Glucose (POC) 124 (H) 11/03/2015 11:17 AM    Glucose (POC) 179 (H) 11/03/2015 06:24 AM    Glucose (POC) 128 (H) 11/02/2015 09:09 PM    Glucose (POC) 105 11/02/2015 03:47 PM    Glucose (POC) 148 (H) 11/02/2015 11:34 AM     Lab Results   Component Value Date/Time    Color YELLOW 11/12/2020 11:48 PM    Appearance CLEAR 11/12/2020 11:48 PM    Specific gravity 1.016 11/12/2020 11:48 PM    pH (UA) 5.0 11/12/2020 11:48 PM    Protein Negative 11/12/2020 11:48 PM    Glucose Negative 11/12/2020 11:48 PM    Ketone Negative 11/12/2020 11:48 PM    Bilirubin Negative 11/12/2020 11:48 PM    Urobilinogen 0.2 11/12/2020 11:48 PM    Nitrites Negative 11/12/2020 11:48 PM    Leukocyte Esterase Negative 11/12/2020 11:48 PM    Epithelial cells 4+ 10/08/2020 02:30 AM    Bacteria 3+ (A) 10/08/2020 02:30 AM    WBC 10 to 15 10/08/2020 02:30 AM    RBC 4 to 6 10/08/2020 02:30 AM         Medications Reviewed:     Current Facility-Administered Medications   Medication Dose Route Frequency    arformoterol 15 mcg/budesonide 0.5 mg neb solution   Nebulization BID RT    methylPREDNISolone (PF) (SOLU-MEDROL) injection 40 mg  40 mg IntraVENous Q8H    aspirin delayed-release tablet 81 mg  81 mg Oral DAILY    atorvastatin (LIPITOR) tablet 20 mg  20 mg Oral QHS    [Held by provider] citalopram (CELEXA) tablet 20 mg  20 mg Oral 7am    cloNIDine HCL (CATAPRES) tablet 0.2 mg  0.2 mg Oral Q12H    losartan (COZAAR) tablet 25 mg  25 mg Oral DAILY    montelukast (SINGULAIR) tablet 10 mg  10 mg Oral QHS    nitroglycerin (NITROSTAT) tablet 0.4 mg  0.4 mg SubLINGual Q5MIN PRN    sodium chloride (NS) flush 5-40 mL  5-40 mL IntraVENous Q8H    sodium chloride (NS) flush 5-40 mL  5-40 mL IntraVENous PRN    acetaminophen (TYLENOL) tablet 650 mg  650 mg Oral Q6H PRN    Or    acetaminophen (TYLENOL) suppository 650 mg  650 mg Rectal Q6H PRN    polyethylene glycol (MIRALAX) packet 17 g  17 g Oral DAILY PRN    promethazine (PHENERGAN) tablet 12.5 mg  12.5 mg Oral Q6H PRN    Or    ondansetron (ZOFRAN) injection 4 mg  4 mg IntraVENous Q6H PRN    enoxaparin (LOVENOX) injection 40 mg  40 mg SubCUTAneous DAILY    albuterol-ipratropium (DUO-NEB) 2.5 MG-0.5 MG/3 ML  3 mL Nebulization Q4H RT    albuterol-ipratropium (DUO-NEB) 2.5 MG-0.5 MG/3 ML  3 mL Nebulization Q2H PRN    azithromycin (ZITHROMAX) 500 mg in 0.9% sodium chloride 250 mL (VIAL-MATE)  500 mg IntraVENous Q24H     ______________________________________________________________________  EXPECTED LENGTH OF STAY: - - -  ACTUAL LENGTH OF STAY:          0                 Sammie Boles MD

## 2021-03-12 NOTE — PROGRESS NOTES
Patient screaming in room using profanity states provider will not prescribe her anything for pain and she has been on pain medication daily for 18 years. She also states provider ordered a walk test to see if she could be weaned from her oxygen which she refused, stating she is on 5L/of oxygen at home. Patient irate wanting to speak with someone in management. Listened quietly and let patient vent. Spoke with Clinical Lead ROSALINE Barr  And she will come talk with patient.

## 2021-03-12 NOTE — ROUTINE PROCESS
Bedside shift change report given to Tila Lyman (oncoming nurse) by Sukhwinder Vidal (offgoing nurse). Report included the following information SBAR.

## 2021-03-12 NOTE — PROGRESS NOTES
Patient becoming increasingly agitated, requesting a wheelchair so that she can leave. Patient was informed that it was against medical advice if she leaves. Patient refusing to sign paperwork. Patient stated that she has already called her ride.

## 2021-03-12 NOTE — PROGRESS NOTES
Patient calling out for breathing treatment. Primary RN made aware, respiratory therapy called. Went to tell patient respiratory was on their way, patient stated \"I have my own S---!\" Patient obtaining medication out of purse and hooking it up. Informed patient to wait on respiratory. Patient stating \"F--- that! \"

## 2021-03-12 NOTE — PROGRESS NOTES
Attempted to speak with patient regarding discharge planning. Entered patients room and introduced myself as CM and confirmed name and date of birth. Asked patient if we can discuss discharge planning which she replied yes. Attempted to ask patient additional questions where she lives, does she live alone, will anyone be able to assist her at home but, patient laid on her side and closed her eyes and refused to speak. Notified patient that I will attempt to come back later with no response from patient. Attempted to call patient's daughter and sister with no answer, left voicemail to call CM back. Will continue to follow up.      Chace Rizo RN, BSN   Care Management  [phone]

## 2021-03-12 NOTE — PROGRESS NOTES
Patient screaming down hallway and constantly pushing all button . Patient states she is leaving against Medical advice. Security paged to escort patient out of building.

## 2021-03-12 NOTE — ROUTINE PROCESS
Patient refused resp treatment when resp came she is alert and oriented times three with no signs or symptoms of distress.

## 2021-03-12 NOTE — PROGRESS NOTES
[facility]  Pulmonary, Critical Care, and Sleep Medicine    Pulmonary Medicine Progress Note    Name: Daphney Saini MRN: [id number]  : 1965 Hospital: [facility]  Date: 3/12/2021       IMPRESSION:  Patient Active Problem List   Diagnosis Code    Abnormal CT of the chest R93.89    Asthma J45.909    Respiratory failure ([address] J96.90    Acute exacerbation of COPD with asthma ([address] J44.1, J45.901    Essential hypertension I10    Morbid obesity due to excess calories ([address] E66.01    Hypertensive heart failure ([address] I11.0    Sleep apnea G47.30    COPD (chronic obstructive pulmonary disease) with chronic bronchitis ([address] J44.9    T wave inversion in EKG R94.31    Tobacco use Z72.0    S/P hernia repair Z98.890, Z87.19    CKD (chronic kidney disease) stage 3, GFR 30-59 ml/min N18.30    Cocaine abuse ([address] F14.10    Drug-seeking behavior Z76.5    Fibromyalgia M79.7    Depression F32.9    Fe deficiency anemia D50.9    Gastroesophageal reflux disease without esophagitis K21.9    HLD (hyperlipidemia) E78.5    Thyroid goiter E04.9    MDRO (multiple drug resistant organisms) resistance [id number]    On home O2 Z99.81    Polysubstance abuse ([facility]) O21.11    Uncomplicated severe persistent asthma J45.50    Vocal cord disease J38.3    COPD with acute exacerbation ([facility]) J44.1    Chronic respiratory failure with hypoxia ([facility]) J96.11    Acute bronchitis J20.9    Suspected COVID-19 virus infection Z20.822    Tachycardia R00.0    Normocytic anemia D64.9    Acute respiratory distress R06.03    COPD exacerbation ([facility]) J44.1    Acute exacerbation of chronic obstructive pulmonary disease (COPD) ([facility]) J44.1    UTI (urinary tract infection) N39.0    Atypical chest pain R07.89    DEMARCUS (acute kidney injury) ([facility]) N17.9    Asthma exacerbation J45. 0    COPD (chronic obstructive pulmonary disease) ([facility]) J44.9    Acute bronchitis with chronic obstructive pulmonary disease (COPD) ([facility] J44.0, J20.9    CHF (congestive heart failure) ([facility]) I50.9    Long QT interval R94.31    Hypokalemia E87.6    Bilateral lower leg cellulitis L03.116, L03.115    Chest pain at rest R07.9       PLAN:  Acute exacerbation of COPD -continue with Solu-Medrol, decrease dose. Continue with duo nebs, budesonide nebs, arformoterol nebs. Continue with azithromycin for 3 days. Acute on respiratory failure with hypoxia -patient is on home oxygen. She refused a walk test in the hospital.  Titrate FiO2 to keep sats more than 90%  Chest pain -patient has history of recurrent noncardiac chest pain. She has a new left bundle branch block. She has been eval by cardiology. Troponin is negative. Echocardiogram shows normal LV function with no regional wall motion abnormality. CTA chest does not show any PE. Cardiology does not recommend any further work-up at this time. Defer to cardiology  Allergic rhinitiscontinue with Singulair  History of hypertension -patient is on Cozaar, clonidine  History of smoking -advised patient to not smoke  History of chronic painpatient is upset that she is not being given pain medications. I will defer pain management to the primary team  DVT prophylaxiscontinue with Lovenox    FiO2 to keep SpO2 >=90%, HOB >=30 degree, aspiration precautions, aggressive pulmonary toileting, incentive spirometry. Other issues management by primary team and respective consultants. Events and notes from last 24 hours reviewed. Care plan discussed with nursing. Labs and images personally seen and available reports reviewed  All current medicines are reviewed     Subjective/Interval History:    Patient reports her dyspnea is improving. Cough is also better.   Complains of chronic pain issues and is upset that she is not getting her pain meds    Medications- Current:  Current Facility-Administered Medications   Medication Dose Route Frequency    arformoterol 15 mcg/budesonide 0.5 mg neb solution   Nebulization BID RT    aspirin delayed-release tablet 81 mg  81 mg Oral DAILY    atorvastatin (LIPITOR) tablet 20 mg  20 mg Oral QHS    [Held by provider] citalopram (CELEXA) tablet 20 mg  20 mg Oral 7am    cloNIDine HCL (CATAPRES) tablet 0.2 mg  0.2 mg Oral Q12H    losartan (COZAAR) tablet 25 mg  25 mg Oral DAILY    montelukast (SINGULAIR) tablet 10 mg  10 mg Oral QHS    nitroglycerin (NITROSTAT) tablet 0.4 mg  0.4 mg SubLINGual Q5MIN PRN    sodium chloride (NS) flush 5-40 mL  5-40 mL IntraVENous Q8H    sodium chloride (NS) flush 5-40 mL  5-40 mL IntraVENous PRN    acetaminophen (TYLENOL) tablet 650 mg  650 mg Oral Q6H PRN    Or    acetaminophen (TYLENOL) suppository 650 mg  650 mg Rectal Q6H PRN    polyethylene glycol (MIRALAX) packet 17 g  17 g Oral DAILY PRN    promethazine (PHENERGAN) tablet 12.5 mg  12.5 mg Oral Q6H PRN    Or    ondansetron (ZOFRAN) injection 4 mg  4 mg IntraVENous Q6H PRN    enoxaparin (LOVENOX) injection 40 mg  40 mg SubCUTAneous DAILY    albuterol-ipratropium (DUO-NEB) 2.5 MG-0.5 MG/3 ML  3 mL Nebulization Q4H RT    albuterol-ipratropium (DUO-NEB) 2.5 MG-0.5 MG/3 ML  3 mL Nebulization Q2H PRN    methylPREDNISolone (PF) (SOLU-MEDROL) injection 60 mg  60 mg IntraVENous Q8H    azithromycin (ZITHROMAX) 500 mg in 0.9% sodium chloride 250 mL (VIAL-MATE)  500 mg IntraVENous Q24H       Objective:  Vital Signs:    Visit Vitals  BP (!) 150/76   Pulse 88   Temp 97.6 °F (36.4 °C)   Resp 18   Ht 4' 11\" (1.499 m)   Wt 64 kg (141 lb)   LMP 2009   SpO2 98%   BMI 28.48 kg/m²      O2 Device: Room air, Nasal cannula  O2 Flow Rate (L/min): 3 l/min  Temp (24hrs), Av.5 °F (36.4 °C), Min:97 °F (36.1 °C), Max:98 °F (36.7 °C)      Intake/Output:   Last shift:      No intake/output data recorded. Last 3 shifts: 03/10 1901 -  0700  In: 1700 [P.O.:1200;  I.V.:500]  Out: 0     Intake/Output Summary (Last 24 hours) at 3/12/2021 1135  Last data filed at 3/12/2021 0413  Gross per 24 hour   Intake 1700 ml   Output 0 ml   Net 1700 ml       Physical Exam:     General/Neurology: Alert, Awake  Head:   Normocephalic, without obvious abnormality  Eye:   PERRL, EOM intact  Oral:  Mucus membranes moist  Lung:   B/l air entry decreased  Heart:   S1 S2 present  Abdomen:  Soft, non tender, BS+nt   Extremities:  No pedal edema. Data:      Recent Results (from the past 24 hour(s))   EKG, 12 LEAD, SUBSEQUENT    Collection Time: 03/12/21  6:58 AM   Result Value Ref Range    Ventricular Rate 89 BPM    Atrial Rate 89 BPM    P-R Interval 140 ms    QRS Duration 80 ms    Q-T Interval 364 ms    QTC Calculation (Bezet) 442 ms    Calculated P Axis 70 degrees    Calculated R Axis 66 degrees    Calculated T Axis -116 degrees    Diagnosis       Normal sinus rhythm  Minimal voltage criteria for LVH, may be normal variant  Anteroseptal infarct , age undetermined  T wave abnormality, consider inferior ischemia  Abnormal ECG  When compared with ECG of 11-MAR-2021 07:48,  Anteroseptal infarct is now present           Chemistry   Recent Labs     03/10/21  0029   *      K 3.6      CO2 33*   BUN 12   CREA 0.84   CA 8.4*   AGAP 4   BUCR 14       CBC w/Diff   Recent Labs     03/10/21  0029   WBC 10.0   RBC 3.73*   HGB 10.5*   HCT 33.6*      GRANS 73   LYMPH 20*   EOS 2     CT (Most Recent)   Results from Hospital Encounter encounter on 03/09/21   CTA CHEST W OR W WO CONT    Narrative EXAM: CT Angiogram of the Chest    CLINICAL INDICATION:  chest pain and acute on chronic hypoxemic respiratory  failure    TECHNIQUE: CT angiogram of the chest performed.  MIPS performed    All CT scans at this facility are performed using dose optimization technique as  appropriate to a performed exam, to include automated exposure control,  adjustment of the mA and/or kV according to patient size (including appropriate  matching for site specific examination) or use of iterative reconstruction  technique. IV CONTRAST: 100 cc of Isovue 370     COMPARISON: None    FINDINGS:  Limitations: Motion is present which limits evaluation of the lung parenchyma  and pulmonary arteries. Thyroid: The thyroid is enlarged and heterogeneous. Mediastinum: No evidence of adenopathy or fluid collection. Heart: Unremarkable    Pericardium: Unremarkable    Aorta: No evidence of aortic dissection or aneurysm. Pulmonary Arteries: No evidence of pulmonary embolus. Trachea and Bronchi: Unremarkable. Pleura: No evidence of pleural effusion. Lungs: Mild centrilobular emphysematous changes are noted. No consolidation  identified. Axilla/Chest wall: Unremarkable. Upper Abdomen: Hypodensities in the liver suggestive of cysts. Musculoskeletal: No acute osseous findings. Impression 1. No evidence of pulmonary embolus or aortic dissection. 2.  Enlarged heterogenous thyroid. 3.  COPD changes. XR (Most Recent). CXR reviewed by me and compared with previous CXR   Results from Hospital Encounter encounter on 03/09/21   XR CHEST PORT    Narrative Portable Frontal Chest.    CLINICAL HISTORY: Short of breath and chest pain for a month. TECHNIQUE: Single frontal view of the chest, obtained portably. COMPARISONS: 3/8/2021. FINDINGS:     Lungs clear. Cardiomediastinal silhouette unremarkable. Vascularity normal. No  effusions. Impression No acute disease. See my orders for details     Total care time exclusive of procedures with complex decision making, coordination of care and counseling patient performed and > 50% time spent in face to face evaluation as mentioned above.     This note has been dictated using the dragon dictation system    Cassandra Calle MD  3/12/2021

## 2021-03-12 NOTE — PROGRESS NOTES
Paged Dr. Patsy Burdick  For patient requesting pain medication. Office stated he was not on call and paged Dr. Davy Kasper with no answer.  Answering service stated to call hospitalist here because they were covering [facility].

## 2021-03-12 NOTE — PROGRESS NOTES
Dr. Hitesh Ugalde returned page. Update given to provider and patient requesting pain medication. Orders received that patient will not receive any pain medications, If patient continues to be disruptive, using profanity or the staff feels in dangerplease   have  patient out of building.

## 2021-03-12 NOTE — ROUTINE PROCESS
Patient screaming and yelling ambulating out of the room. Patient requested to talk to the supervisor and questioning why is she to be moved to another floor. Supervisor informed. Respiratory treatment administered as per request. 
Patient cursing and yelling and screaming to RN who helping  Or assisting her. Patient was about to be transferred by wheelchair but refused so patient was transferred by bed.

## 2021-03-13 ENCOUNTER — HOSPITAL ENCOUNTER (EMERGENCY)
Age: 56
Discharge: STILL ADMITTED OR ADMINISTRATIVE | End: 2021-03-13

## 2021-03-14 ENCOUNTER — HOSPITAL ENCOUNTER (EMERGENCY)
Age: 56
Discharge: HOME | DRG: 133 | End: 2021-03-15
Attending: EMERGENCY MEDICINE
Payer: MEDICAID

## 2021-03-14 ENCOUNTER — APPOINTMENT (OUTPATIENT)
Dept: GENERAL RADIOLOGY | Age: 56
DRG: 133 | End: 2021-03-14
Attending: PHYSICIAN ASSISTANT
Payer: MEDICAID

## 2021-03-14 DIAGNOSIS — J44.1 COPD EXACERBATION (HCC): Primary | ICD-10-CM

## 2021-03-14 LAB
ALBUMIN SERPL-MCNC: 3.3 G/DL (ref 3.4–5)
ALBUMIN/GLOB SERPL: 1 {RATIO} (ref 0.8–1.7)
ALP SERPL-CCNC: 90 U/L (ref 45–117)
ALT SERPL-CCNC: 29 U/L (ref 13–56)
ANION GAP SERPL CALC-SCNC: 5 MMOL/L (ref 3–18)
AST SERPL-CCNC: 17 U/L (ref 10–38)
BASOPHILS # BLD: 0 K/UL (ref 0–0.1)
BASOPHILS NFR BLD: 0 % (ref 0–2)
BILIRUB SERPL-MCNC: 0.2 MG/DL (ref 0.2–1)
BUN SERPL-MCNC: 39 MG/DL (ref 7–18)
BUN/CREAT SERPL: 36 (ref 12–20)
CALCIUM SERPL-MCNC: 8.2 MG/DL (ref 8.5–10.1)
CHLORIDE SERPL-SCNC: 112 MMOL/L (ref 100–111)
CK MB CFR SERPL CALC: 7.3 % (ref 0–4)
CK MB SERPL-MCNC: 4.8 NG/ML (ref 5–25)
CK SERPL-CCNC: 66 U/L (ref 26–192)
CO2 SERPL-SCNC: 32 MMOL/L (ref 21–32)
CREAT SERPL-MCNC: 1.09 MG/DL (ref 0.6–1.3)
DIFFERENTIAL METHOD BLD: ABNORMAL
EOSINOPHIL # BLD: 0.1 K/UL (ref 0–0.4)
EOSINOPHIL NFR BLD: 1 % (ref 0–5)
ERYTHROCYTE [DISTWIDTH] IN BLOOD BY AUTOMATED COUNT: 14.7 % (ref 11.6–14.5)
GLOBULIN SER CALC-MCNC: 3.2 G/DL (ref 2–4)
GLUCOSE SERPL-MCNC: 111 MG/DL (ref 74–99)
HCT VFR BLD AUTO: 39.9 % (ref 35–45)
HGB BLD-MCNC: 12.6 G/DL (ref 12–16)
LYMPHOCYTES # BLD: 2 K/UL (ref 0.9–3.6)
LYMPHOCYTES NFR BLD: 16 % (ref 21–52)
MCH RBC QN AUTO: 28.8 PG (ref 24–34)
MCHC RBC AUTO-ENTMCNC: 31.6 G/DL (ref 31–37)
MCV RBC AUTO: 91.3 FL (ref 74–97)
MONOCYTES # BLD: 0.5 K/UL (ref 0.05–1.2)
MONOCYTES NFR BLD: 4 % (ref 3–10)
NEUTS SEG # BLD: 9.7 K/UL (ref 1.8–8)
NEUTS SEG NFR BLD: 79 % (ref 40–73)
PLATELET # BLD AUTO: 303 K/UL (ref 135–420)
PMV BLD AUTO: 10.4 FL (ref 9.2–11.8)
POTASSIUM SERPL-SCNC: 3.8 MMOL/L (ref 3.5–5.5)
PROT SERPL-MCNC: 6.5 G/DL (ref 6.4–8.2)
RBC # BLD AUTO: 4.37 M/UL (ref 4.2–5.3)
SODIUM SERPL-SCNC: 149 MMOL/L (ref 136–145)
TROPONIN I SERPL-MCNC: 0.04 NG/ML (ref 0–0.04)
WBC # BLD AUTO: 12.3 K/UL (ref 4.6–13.2)

## 2021-03-14 PROCEDURE — 74011250636 HC RX REV CODE- 250/636: Performed by: PHYSICIAN ASSISTANT

## 2021-03-14 PROCEDURE — 80053 COMPREHEN METABOLIC PANEL: CPT

## 2021-03-14 PROCEDURE — 85025 COMPLETE CBC W/AUTO DIFF WBC: CPT

## 2021-03-14 PROCEDURE — 82553 CREATINE MB FRACTION: CPT

## 2021-03-14 PROCEDURE — 71045 X-RAY EXAM CHEST 1 VIEW: CPT

## 2021-03-14 PROCEDURE — 99285 EMERGENCY DEPT VISIT HI MDM: CPT

## 2021-03-14 PROCEDURE — 93005 ELECTROCARDIOGRAM TRACING: CPT

## 2021-03-14 PROCEDURE — 74011000250 HC RX REV CODE- 250: Performed by: PHYSICIAN ASSISTANT

## 2021-03-14 PROCEDURE — 94640 AIRWAY INHALATION TREATMENT: CPT

## 2021-03-14 PROCEDURE — 96374 THER/PROPH/DIAG INJ IV PUSH: CPT

## 2021-03-14 RX ORDER — IPRATROPIUM BROMIDE AND ALBUTEROL SULFATE 2.5; .5 MG/3ML; MG/3ML
3 SOLUTION RESPIRATORY (INHALATION)
Status: COMPLETED | OUTPATIENT
Start: 2021-03-14 | End: 2021-03-14

## 2021-03-14 RX ORDER — IPRATROPIUM BROMIDE AND ALBUTEROL SULFATE 2.5; .5 MG/3ML; MG/3ML
3 SOLUTION RESPIRATORY (INHALATION)
Status: DISCONTINUED | OUTPATIENT
Start: 2021-03-14 | End: 2021-03-15 | Stop reason: HOSPADM

## 2021-03-14 RX ORDER — DEXAMETHASONE SODIUM PHOSPHATE 4 MG/ML
10 INJECTION, SOLUTION INTRA-ARTICULAR; INTRALESIONAL; INTRAMUSCULAR; INTRAVENOUS; SOFT TISSUE
Status: COMPLETED | OUTPATIENT
Start: 2021-03-14 | End: 2021-03-14

## 2021-03-14 RX ORDER — IPRATROPIUM BROMIDE AND ALBUTEROL SULFATE 2.5; .5 MG/3ML; MG/3ML
3 SOLUTION RESPIRATORY (INHALATION)
Status: ACTIVE | OUTPATIENT
Start: 2021-03-14 | End: 2021-03-15

## 2021-03-14 RX ADMIN — IPRATROPIUM BROMIDE AND ALBUTEROL SULFATE 3 ML: .5; 3 SOLUTION RESPIRATORY (INHALATION) at 20:16

## 2021-03-14 RX ADMIN — DEXAMETHASONE SODIUM PHOSPHATE 10 MG: 4 INJECTION, SOLUTION INTRAMUSCULAR; INTRAVENOUS at 20:16

## 2021-03-14 NOTE — ED NOTES
Patient currently standing in [address], with no oxygen on, no signs of distress noted. Patient requesting to be put in a different room to watch TV. Patient refusing to go in room. Charge nurse informed.

## 2021-03-14 NOTE — ED PROVIDER NOTES
EMERGENCY DEPARTMENT HISTORY AND PHYSICAL EXAM      Date: 3/14/2021  Patient Name: Margie Saldivar    History of Presenting Illness     Chief Complaint   Patient presents with    Shortness of Breath       History Provided By: Patient    HPI: Margie Saldivar, 63 y.o. female PMHx significant for htn, CHF, thyroid dysfunction, hypercholesterolemia, hypertension, sleep apnea, COPD, CKD, GERD, presents via ambulance to the ED. Pt reports worsening SOB and difficulty breathing. Pt reports wearing O2 as prescribed at home. Pt on 5L O2 at home. Denies cough, congestion, fever/chills, CP, dizziness. Pt reports continued lower leg pain and swelling b/l. There are no other complaints, changes, or physical findings at this time. PCP: Erin Lafleur MD    No current facility-administered medications on file prior to encounter. Current Outpatient Medications on File Prior to Encounter   Medication Sig Dispense Refill    oxyCODONE-acetaminophen (Percocet) 5-325 mg per tablet Take 1 Tab by mouth every four (4) hours as needed for Pain for up to 7 days. Max Daily Amount: 6 Tabs. 6 Tab 0    predniSONE (DELTASONE) 10 mg tablet 4 tablets p.o. daily for 3 days then 3 tablets daily for 5 days then 2 tablets p.o. daily for 5 days then 1 tablet p.o. daily 60 Tab 0    albuterol (PROVENTIL VENTOLIN) 2.5 mg /3 mL (0.083 %) nebu 1 nebulizer treatment every 4 hours while awake for 1 week then every 4 hours as needed for shortness of breath 60 Each 1    cloNIDine HCL (CATAPRES) 0.2 mg tablet Take 1 Tab by mouth every twelve (12) hours. 60 Tab 0    famotidine (PEPCID) 20 mg tablet Take 1 Tab by mouth nightly. 10 Tab 0    losartan (COZAAR) 25 mg tablet Take 1 Tab by mouth daily. Indications: high blood pressure 30 Tab 0    Nebulizer & Compressor machine 1 Each by Does Not Apply route as needed for Wheezing or Shortness of Breath.  1 Each 0    fluticasone furoate-vilanteroL (Breo Ellipta) 200-25 mcg/dose inhaler Take 1 Puff by inhalation daily. Indications: controller medication for asthma 1 Inhaler 3    nitroglycerin (NITROSTAT) 0.4 mg SL tablet Take 1 Tab by mouth every five (5) minutes as needed for Chest Pain. Sit/Lay down then put one tab under the tongue every 5 minutes as needed for chest pain for 3 doses 1 Bottle 0    aspirin delayed-release 81 mg tablet Take 1 Tab by mouth daily. 30 Tab 0    montelukast (SINGULAIR) 10 mg tablet Take 1 Tab by mouth nightly. Indications: controller medication for asthma 30 Tab 1    roflumilast (DALIRESP) 500 mcg tab tablet Take 1 Tab by mouth daily. 30 Tab 3    citalopram (CeleXA) 20 mg tablet Take 1 Tab by mouth every morning. 30 Tab 0    OXYGEN-AIR DELIVERY SYSTEMS 3 L by IntraNASal route as needed for Other (sob).  atorvastatin (LIPITOR) 20 mg tablet Take 1 Tab by mouth nightly. 30 Tab 0    naloxone (NARCAN) 4 mg/actuation nasal spray Use 1 spray intranasally, then discard. Repeat with new spray every 2 min as needed for opioid overdose symptoms, alternating nostrils.  1 Each 0       Past History     Past Medical History:  Past Medical History:   Diagnosis Date    CHF (congestive heart failure) (HCC)     Chronic respiratory failure with hypoxia ([address] 9/7/2020    CKD (chronic kidney disease) stage 3, GFR 30-59 ml/min 10/31/2019    Cocaine abuse ([address] 11/26/2017    COPD (chronic obstructive pulmonary disease) with chronic bronchitis ([address] 12/19/2017    Dependence on supplemental oxygen     Depression 3/22/2020    Drug-seeking behavior 11/19/2016    Endocrine disease     thyroid issues    Essential hypertension 3/10/2017    Fe deficiency anemia 10/27/2012    Fibromyalgia 12/16/2016    Gastroesophageal reflux disease without esophagitis 10/10/2016    Gastrointestinal disorder     \"blockage in my stomach\"    Hypercholesterolemia     Hypertension     Hypertensive heart failure ([address] 12/19/2017    Morbid obesity due to excess calories ([address] 3/10/2017    NSTEMI (non-ST elevated myocardial infarction) ([facility] [address] 3/22/2020    On home O2 12/3/2015    Overview:  2L at home per pt for approx 8 years    Polysubstance abuse ([facility] [address] 9/15/2018    Respiratory failure ([facility] [address] 1/11/2017    S/P hernia repair 10/31/2019    Sleep apnea 12/19/2017    T wave inversion in EKG 3/9/2019    Tobacco use 1/62/6247    Uncomplicated severe persistent asthma 12/13/2016    Vocal cord disease 12/7/2016       Past Surgical History:  Past Surgical History:   Procedure Laterality Date    HX GI      Exploratory laparotomy with lysis of adhesions and primary repair of incarcerated umbilical hernia       Family History:  No family history on file. Social History:  Social History     Tobacco Use    Smoking status: Former Smoker     Packs/day: 0.25    Smokeless tobacco: Current User    Tobacco comment: Pt states she has not had money to buy cigarettes in the past month   Substance Use Topics    Alcohol use: No     Comment: socially    Drug use: Not Currently     Types: Heroin     Comment: pt reports using approximately a month ago       Allergies:  No Known Allergies      Review of Systems   Review of Systems   Constitutional: Negative for chills and fever. Respiratory: Positive for shortness of breath. Cardiovascular: Negative for chest pain. Gastrointestinal: Negative for abdominal pain, nausea and vomiting. Genitourinary: Negative for flank pain. Musculoskeletal: Negative for back pain and myalgias. Skin: Negative for color change, pallor, rash and wound. Neurological: Negative for dizziness, weakness and light-headedness. All other systems reviewed and are negative. Physical Exam   Physical Exam  Vitals signs and nursing note reviewed. Constitutional:       General: She is not in acute distress. Appearance: She is well-developed. Comments: Pt working to breathe    HENT:      Head: Normocephalic and atraumatic.    Eyes:      Conjunctiva/sclera: Conjunctivae normal. Cardiovascular:      Rate and Rhythm: Normal rate and regular rhythm. Heart sounds: Normal heart sounds. Pulmonary:      Effort: Pulmonary effort is normal. No respiratory distress. Breath sounds: Normal breath sounds. Comments: Expiratory wheezing in all lung fields  Accessory muscles used for breathing  Abdominal:      General: Bowel sounds are normal. There is no distension. Palpations: Abdomen is soft. Musculoskeletal: Normal range of motion. Skin:     General: Skin is warm. Findings: No rash. Neurological:      Mental Status: She is alert and oriented to person, place, and time. Psychiatric:         Behavior: Behavior normal.         Diagnostic Study Results     Labs -   No results found for this or any previous visit (from the past 12 hour(s)). Radiologic Studies -   XR CHEST PORT    (Results Pending)     CT Results  (Last 48 hours)    None        CXR Results  (Last 48 hours)    None          Medical Decision Making   I am the first provider for this patient. I reviewed the vital signs, available nursing notes, past medical history, past surgical history, family history and social history. Vital Signs-Reviewed the patient's vital signs. Patient Vitals for the past 12 hrs:   Temp Pulse Resp SpO2   03/14/21 1854 98.1 °F (36.7 °C) 94 (!) 31 100 %         EKG interpretation: (Preliminary)  Rhythm: Sinus rhythm; and short NH. Rate (approx.): 96; Axis: left axis deviation; NH interval: normal; QRS interval: normal ; ST/T wave: normal; Other findings: LBBB. Similar to previous EKG from 3/10. No acute ischemic changes. Records Reviewed: Nursing Notes, Old Medical Records and Previous electrocardiograms    Provider Notes (Medical Decision Making):   DDx: COPD exacerbation, ACS, PNA      ED Course:   Initial assessment performed. The patients presenting problems have been discussed, and they are in agreement with the care plan formulated and outlined with them.   I have encouraged them to ask questions as they arise throughout their visit. 3/10: Cardiology consult: Chest pain. History of recurrent noncardiac CP/chronic pain but now with new LBBB, possible rate related. Troponin unremarkable. No obstructive CAD on cardiac cath 3/2020. Echocardiogram done in the emergency room today showed normal LV function no regional wall motion abnormality. Her symptoms sound more atypical for angina I suspect either pulmonary in nature or musculoskeletal.     --New left bundle branch block. Suspect primarily an electrical phenomenon. Cardiac biomarkers have all been normal.  -Chronic pain syndrome.  -Hypertension. Uncontrolled. -COPD. On home oxygen. With recent recurrent COPD exacerbation admission. Significant wheezing on exam today. -H/o polysubstance abuse. On methadone. -H/o noncompliance. · 3/10: Pulmonary consult: NIPPV support now (for work of breathing) and as needed - initial IPAP/EPAP 12/5, titrate settings, including FiO2 to keep SpO2 between 88-92%. If pt is off NIPPV, may have oxygen supplementation with similar target SpO2 88-92%  · Agree with scheduled bronchodilators -- albuterol/ipratropium, LABA/ICS, IV steroids  · Start azithromycin x 3 days for anti-inflammatory effect --hold if QTC > 500  · Continue montelukast  · Watch for SSx of withdrawal  Once pt is ready for discharge, may go back on Breo ellipta, rescue inhaler with albuterol. Pt would benefit from repeat PFT and follow-up with       Each time that pt is re-evaluated, she is not wearing O2. Discussed with pt multiple times that this will cause increased work of breathing and SOB. Pt states she is aware but continues to remove O2 upon leaving room. [facility]  Dr Gricelda Cope evaluated pt at bedside. Pt working to breathe and starting to tire out. Pt reports feeling worse than usual. Respiratory called and BiPap ordered. 0095  Respiratory at bedside to apply BiPAP. Pt adamantly declined ABG.   Patient states she would like to wait until eating she has BiPAP. Will call respiratory after patient finished eating. 2345  Patient has now finished eating and respiratory again called to start BiPAP. Pt re-evaluated multiple times and will not keep BiPap on.       0342  Transfer of care to Dr Daniel Collins attending at shift change. Plan: Awaiting re-evaluation after BiPap. Attestations:    DARCIE Fields    Please note that this dictation was completed with [employer], the computer voice recognition software. Quite often unanticipated grammatical, syntax, homophones, and other interpretive errors are inadvertently transcribed by the computer software. Please disregard these errors. Please excuse any errors that have escaped final proofreading. Thank you.

## 2021-03-14 NOTE — ED TRIAGE NOTES
Patient arrives via [employer] with SOB after receiving two breathing treatments, 10L while receiving breathing treatment. There is wheezing present, patient continues to have bilateral leg swelling. Patient alert and oriented x4.

## 2021-03-15 VITALS
HEART RATE: 88 BPM | RESPIRATION RATE: 18 BRPM | WEIGHT: 141 LBS | OXYGEN SATURATION: 99 % | DIASTOLIC BLOOD PRESSURE: 88 MMHG | HEIGHT: 59 IN | TEMPERATURE: 98.1 F | SYSTOLIC BLOOD PRESSURE: 132 MMHG | BODY MASS INDEX: 28.43 KG/M2

## 2021-03-15 LAB
ATRIAL RATE: 96 BPM
CALCULATED P AXIS, ECG09: 59 DEGREES
CALCULATED R AXIS, ECG10: -46 DEGREES
CALCULATED T AXIS, ECG11: 90 DEGREES
DIAGNOSIS, 93000: NORMAL
P-R INTERVAL, ECG05: 110 MS
Q-T INTERVAL, ECG07: 408 MS
QRS DURATION, ECG06: 128 MS
QTC CALCULATION (BEZET), ECG08: 515 MS
VENTRICULAR RATE, ECG03: 96 BPM

## 2021-03-15 PROCEDURE — 96375 TX/PRO/DX INJ NEW DRUG ADDON: CPT

## 2021-03-15 PROCEDURE — 74011250636 HC RX REV CODE- 250/636: Performed by: PHYSICIAN ASSISTANT

## 2021-03-15 RX ORDER — PREDNISONE 20 MG/1
60 TABLET ORAL DAILY
Qty: 15 TAB | Refills: 0 | Status: SHIPPED | OUTPATIENT
Start: 2021-03-15 | End: 2021-03-20

## 2021-03-15 RX ORDER — MORPHINE SULFATE 2 MG/ML
2 INJECTION, SOLUTION INTRAMUSCULAR; INTRAVENOUS
Status: COMPLETED | OUTPATIENT
Start: 2021-03-15 | End: 2021-03-15

## 2021-03-15 RX ADMIN — MORPHINE SULFATE 2 MG: 2 INJECTION, SOLUTION INTRAMUSCULAR; INTRAVENOUS at 02:00

## 2021-03-15 NOTE — ED NOTES
Pt stated to RN \"I need pain medicine, I'm in pain! \" RN told pt that she has talked to MD and that MD has not placed pain meds for her. Pt states \"I need that bitch to come here or I'm Taylor Mcdaniel go to her. I ain't gonna wait for this shit\". Is currently wearing her O2, but no monitoring at this time.  She does not want it on

## 2021-03-15 NOTE — ED NOTES
Pt states that she does not want to be discharged and that she \"will come for the staff that's discharging me. \" Pt states that she is not feeling better despite not being in any respiratory distress or having any issues ambulating to the bathroom and to the samuels multiple times. Pt has called staff names, yelled insults and has not been compliant all night with her oxygen, monitoring or Bipap. Pt demanding food and coffee. RN removed IV and gave pt discharge papers. Pt states that she \"ain't leavin'. \"

## 2021-03-15 NOTE — ED NOTES
Pt is up and down in the bed, has been taking her oxygen on and off. Pt has no signs of distress. States that she needs something for pain and that tylenol will NOT work.

## 2021-03-15 NOTE — ED NOTES
Pt refused her ABG. States that she needs pain meds BEFORE using her BiPap.  Pt demanding pain meds stating \"How come she ain't giving me anything, why I gotta sit in pain?!\"

## 2021-03-17 ENCOUNTER — DOCUMENTATION ONLY (OUTPATIENT)
Dept: PULMONOLOGY | Age: 56
End: 2021-03-17

## 2021-03-18 ENCOUNTER — DOCUMENTATION ONLY (OUTPATIENT)
Dept: PULMONOLOGY | Age: 56
End: 2021-03-18

## 2021-03-18 ENCOUNTER — APPOINTMENT (OUTPATIENT)
Dept: GENERAL RADIOLOGY | Age: 56
DRG: 133 | End: 2021-03-18
Attending: EMERGENCY MEDICINE
Payer: MEDICAID

## 2021-03-18 ENCOUNTER — HOSPITAL ENCOUNTER (INPATIENT)
Age: 56
LOS: 2 days | Discharge: LEFT BEFORE DISCHARGE | DRG: 133 | End: 2021-03-20
Attending: EMERGENCY MEDICINE | Admitting: FAMILY MEDICINE
Payer: MEDICAID

## 2021-03-18 DIAGNOSIS — J96.21 ACUTE ON CHRONIC RESPIRATORY FAILURE WITH HYPOXIA AND HYPERCAPNIA (HCC): Primary | ICD-10-CM

## 2021-03-18 DIAGNOSIS — R77.8 ELEVATED TROPONIN: ICD-10-CM

## 2021-03-18 DIAGNOSIS — J96.22 ACUTE ON CHRONIC RESPIRATORY FAILURE WITH HYPOXIA AND HYPERCAPNIA (HCC): Primary | ICD-10-CM

## 2021-03-18 DIAGNOSIS — F14.10 COCAINE ABUSE (HCC): ICD-10-CM

## 2021-03-18 LAB
ALBUMIN SERPL-MCNC: 3.7 G/DL (ref 3.4–5)
ALBUMIN/GLOB SERPL: 1.1 {RATIO} (ref 0.8–1.7)
ALP SERPL-CCNC: 80 U/L (ref 45–117)
ALT SERPL-CCNC: 32 U/L (ref 13–56)
AMPHET UR QL SCN: NEGATIVE
ANION GAP SERPL CALC-SCNC: 4 MMOL/L (ref 3–18)
APPEARANCE UR: CLEAR
APTT PPP: 28.2 SEC (ref 23–36.4)
ARTERIAL PATENCY WRIST A: YES
AST SERPL-CCNC: 17 U/L (ref 10–38)
ATRIAL RATE: 79 BPM
ATRIAL RATE: 81 BPM
ATRIAL RATE: 82 BPM
BARBITURATES UR QL SCN: NEGATIVE
BASE EXCESS BLD CALC-SCNC: 8 MMOL/L
BASOPHILS # BLD: 0 K/UL (ref 0–0.1)
BASOPHILS NFR BLD: 0 % (ref 0–2)
BDY SITE: ABNORMAL
BENZODIAZ UR QL: NEGATIVE
BILIRUB SERPL-MCNC: 0.5 MG/DL (ref 0.2–1)
BILIRUB UR QL: NEGATIVE
BNP SERPL-MCNC: 4548 PG/ML (ref 0–900)
BODY TEMPERATURE: 98.6
BUN SERPL-MCNC: 20 MG/DL (ref 7–18)
BUN/CREAT SERPL: 24 (ref 12–20)
CALCIUM SERPL-MCNC: 8.6 MG/DL (ref 8.5–10.1)
CALCULATED P AXIS, ECG09: 51 DEGREES
CALCULATED P AXIS, ECG09: 54 DEGREES
CALCULATED P AXIS, ECG09: 70 DEGREES
CALCULATED R AXIS, ECG10: -53 DEGREES
CALCULATED R AXIS, ECG10: -53 DEGREES
CALCULATED R AXIS, ECG10: -55 DEGREES
CALCULATED T AXIS, ECG11: 84 DEGREES
CALCULATED T AXIS, ECG11: 87 DEGREES
CALCULATED T AXIS, ECG11: 88 DEGREES
CANNABINOIDS UR QL SCN: NEGATIVE
CHLORIDE SERPL-SCNC: 108 MMOL/L (ref 100–111)
CK MB CFR SERPL CALC: 3.1 % (ref 0–4)
CK MB CFR SERPL CALC: 4.3 % (ref 0–4)
CK MB SERPL-MCNC: 4.5 NG/ML (ref 5–25)
CK MB SERPL-MCNC: 4.7 NG/ML (ref 5–25)
CK SERPL-CCNC: 110 U/L (ref 26–192)
CK SERPL-CCNC: 143 U/L (ref 26–192)
CO2 SERPL-SCNC: 32 MMOL/L (ref 21–32)
COCAINE UR QL SCN: POSITIVE
COLOR UR: YELLOW
CREAT SERPL-MCNC: 0.84 MG/DL (ref 0.6–1.3)
DIAGNOSIS, 93000: NORMAL
DIFFERENTIAL METHOD BLD: ABNORMAL
EOSINOPHIL # BLD: 0.3 K/UL (ref 0–0.4)
EOSINOPHIL NFR BLD: 2 % (ref 0–5)
ERYTHROCYTE [DISTWIDTH] IN BLOOD BY AUTOMATED COUNT: 14.4 % (ref 11.6–14.5)
GAS FLOW.O2 O2 DELIVERY SYS: ABNORMAL L/MIN
GLOBULIN SER CALC-MCNC: 3.3 G/DL (ref 2–4)
GLUCOSE SERPL-MCNC: 105 MG/DL (ref 74–99)
GLUCOSE UR STRIP.AUTO-MCNC: NEGATIVE MG/DL
HCO3 BLD-SCNC: 37.1 MMOL/L (ref 22–26)
HCT VFR BLD AUTO: 39 % (ref 35–45)
HDSCOM,HDSCOM: ABNORMAL
HGB BLD-MCNC: 12.3 G/DL (ref 12–16)
HGB UR QL STRIP: NEGATIVE
INR PPP: 1.1 (ref 0.8–1.2)
KETONES UR QL STRIP.AUTO: NEGATIVE MG/DL
LEUKOCYTE ESTERASE UR QL STRIP.AUTO: NEGATIVE
LYMPHOCYTES # BLD: 2.6 K/UL (ref 0.9–3.6)
LYMPHOCYTES NFR BLD: 20 % (ref 21–52)
MAGNESIUM SERPL-MCNC: 2.2 MG/DL (ref 1.6–2.6)
MCH RBC QN AUTO: 29 PG (ref 24–34)
MCHC RBC AUTO-ENTMCNC: 31.5 G/DL (ref 31–37)
MCV RBC AUTO: 92 FL (ref 74–97)
METHADONE UR QL: NEGATIVE
MONOCYTES # BLD: 0.9 K/UL (ref 0.05–1.2)
MONOCYTES NFR BLD: 7 % (ref 3–10)
NEUTS SEG # BLD: 9.1 K/UL (ref 1.8–8)
NEUTS SEG NFR BLD: 71 % (ref 40–73)
NITRITE UR QL STRIP.AUTO: NEGATIVE
O2/TOTAL GAS SETTING VFR VENT: 65 %
OPIATES UR QL: POSITIVE
P-R INTERVAL, ECG05: 124 MS
P-R INTERVAL, ECG05: 124 MS
P-R INTERVAL, ECG05: 128 MS
PCO2 BLD: 76.3 MMHG (ref 35–45)
PCP UR QL: NEGATIVE
PEEP RESPIRATORY: 5 CMH2O
PH BLD: 7.3 [PH] (ref 7.35–7.45)
PH UR STRIP: 6.5 [PH] (ref 5–8)
PIP ISTAT,IPIP: 16
PLATELET # BLD AUTO: 226 K/UL (ref 135–420)
PMV BLD AUTO: 10.5 FL (ref 9.2–11.8)
PO2 BLD: 291 MMHG (ref 80–100)
POTASSIUM SERPL-SCNC: 4 MMOL/L (ref 3.5–5.5)
PRESSURE SUPPORT SETTING VENT: 11 CMH2O
PROT SERPL-MCNC: 7 G/DL (ref 6.4–8.2)
PROT UR STRIP-MCNC: 100 MG/DL
PROTHROMBIN TIME: 14.1 SEC (ref 11.5–15.2)
Q-T INTERVAL, ECG07: 438 MS
Q-T INTERVAL, ECG07: 442 MS
Q-T INTERVAL, ECG07: 474 MS
QRS DURATION, ECG06: 118 MS
QRS DURATION, ECG06: 118 MS
QRS DURATION, ECG06: 126 MS
QTC CALCULATION (BEZET), ECG08: 511 MS
QTC CALCULATION (BEZET), ECG08: 513 MS
QTC CALCULATION (BEZET), ECG08: 543 MS
RBC # BLD AUTO: 4.24 M/UL (ref 4.2–5.3)
SAO2 % BLD: 100 % (ref 92–97)
SERVICE CMNT-IMP: ABNORMAL
SODIUM SERPL-SCNC: 144 MMOL/L (ref 136–145)
SP GR UR REFRACTOMETRY: 1.02 (ref 1–1.03)
SPECIMEN TYPE: ABNORMAL
T4 FREE SERPL-MCNC: 1.5 NG/DL (ref 0.7–1.5)
TOTAL RESP. RATE, ITRR: 16
TROPONIN I SERPL-MCNC: 0.05 NG/ML (ref 0–0.04)
TROPONIN I SERPL-MCNC: 0.07 NG/ML (ref 0–0.04)
TSH SERPL DL<=0.05 MIU/L-ACNC: 0.24 UIU/ML (ref 0.36–3.74)
UROBILINOGEN UR QL STRIP.AUTO: 0.2 EU/DL (ref 0.2–1)
VENTRICULAR RATE, ECG03: 79 BPM
VENTRICULAR RATE, ECG03: 81 BPM
VENTRICULAR RATE, ECG03: 82 BPM
WBC # BLD AUTO: 12.9 K/UL (ref 4.6–13.2)
WBC URNS QL MICRO: NORMAL /HPF (ref 0–5)

## 2021-03-18 PROCEDURE — 94640 AIRWAY INHALATION TREATMENT: CPT

## 2021-03-18 PROCEDURE — 74011000250 HC RX REV CODE- 250: Performed by: INTERNAL MEDICINE

## 2021-03-18 PROCEDURE — 99222 1ST HOSP IP/OBS MODERATE 55: CPT | Performed by: FAMILY MEDICINE

## 2021-03-18 PROCEDURE — 65660000004 HC RM CVT STEPDOWN

## 2021-03-18 PROCEDURE — 85610 PROTHROMBIN TIME: CPT

## 2021-03-18 PROCEDURE — 96365 THER/PROPH/DIAG IV INF INIT: CPT

## 2021-03-18 PROCEDURE — 85025 COMPLETE CBC W/AUTO DIFF WBC: CPT

## 2021-03-18 PROCEDURE — 94660 CPAP INITIATION&MGMT: CPT

## 2021-03-18 PROCEDURE — 82550 ASSAY OF CK (CPK): CPT

## 2021-03-18 PROCEDURE — 84443 ASSAY THYROID STIM HORMONE: CPT

## 2021-03-18 PROCEDURE — 74011000250 HC RX REV CODE- 250: Performed by: EMERGENCY MEDICINE

## 2021-03-18 PROCEDURE — 80053 COMPREHEN METABOLIC PANEL: CPT

## 2021-03-18 PROCEDURE — 82803 BLOOD GASES ANY COMBINATION: CPT

## 2021-03-18 PROCEDURE — 83880 ASSAY OF NATRIURETIC PEPTIDE: CPT

## 2021-03-18 PROCEDURE — 5A09357 ASSISTANCE WITH RESPIRATORY VENTILATION, LESS THAN 24 CONSECUTIVE HOURS, CONTINUOUS POSITIVE AIRWAY PRESSURE: ICD-10-PCS | Performed by: EMERGENCY MEDICINE

## 2021-03-18 PROCEDURE — 83735 ASSAY OF MAGNESIUM: CPT

## 2021-03-18 PROCEDURE — 36600 WITHDRAWAL OF ARTERIAL BLOOD: CPT

## 2021-03-18 PROCEDURE — 81001 URINALYSIS AUTO W/SCOPE: CPT

## 2021-03-18 PROCEDURE — 74011250636 HC RX REV CODE- 250/636: Performed by: FAMILY MEDICINE

## 2021-03-18 PROCEDURE — 71045 X-RAY EXAM CHEST 1 VIEW: CPT

## 2021-03-18 PROCEDURE — 84439 ASSAY OF FREE THYROXINE: CPT

## 2021-03-18 PROCEDURE — 99285 EMERGENCY DEPT VISIT HI MDM: CPT

## 2021-03-18 PROCEDURE — 2709999900 HC NON-CHARGEABLE SUPPLY

## 2021-03-18 PROCEDURE — 74011250637 HC RX REV CODE- 250/637: Performed by: FAMILY MEDICINE

## 2021-03-18 PROCEDURE — 74011250636 HC RX REV CODE- 250/636: Performed by: EMERGENCY MEDICINE

## 2021-03-18 PROCEDURE — 80307 DRUG TEST PRSMV CHEM ANLYZR: CPT

## 2021-03-18 PROCEDURE — 96375 TX/PRO/DX INJ NEW DRUG ADDON: CPT

## 2021-03-18 PROCEDURE — 85730 THROMBOPLASTIN TIME PARTIAL: CPT

## 2021-03-18 PROCEDURE — 93005 ELECTROCARDIOGRAM TRACING: CPT

## 2021-03-18 PROCEDURE — 74011000250 HC RX REV CODE- 250: Performed by: FAMILY MEDICINE

## 2021-03-18 RX ORDER — SODIUM CHLORIDE 0.9 % (FLUSH) 0.9 %
5-40 SYRINGE (ML) INJECTION EVERY 8 HOURS
Status: DISCONTINUED | OUTPATIENT
Start: 2021-03-18 | End: 2021-03-20 | Stop reason: HOSPADM

## 2021-03-18 RX ORDER — VALACYCLOVIR HYDROCHLORIDE 500 MG/1
500 TABLET, FILM COATED ORAL EVERY 12 HOURS
Status: DISCONTINUED | OUTPATIENT
Start: 2021-03-18 | End: 2021-03-20 | Stop reason: HOSPADM

## 2021-03-18 RX ORDER — ASPIRIN 81 MG/1
81 TABLET ORAL DAILY
Status: DISCONTINUED | OUTPATIENT
Start: 2021-03-18 | End: 2021-03-20 | Stop reason: HOSPADM

## 2021-03-18 RX ORDER — POLYETHYLENE GLYCOL 3350 17 G/17G
17 POWDER, FOR SOLUTION ORAL DAILY PRN
Status: DISCONTINUED | OUTPATIENT
Start: 2021-03-18 | End: 2021-03-20 | Stop reason: HOSPADM

## 2021-03-18 RX ORDER — MAGNESIUM SULFATE HEPTAHYDRATE 40 MG/ML
2 INJECTION, SOLUTION INTRAVENOUS
Status: COMPLETED | OUTPATIENT
Start: 2021-03-18 | End: 2021-03-18

## 2021-03-18 RX ORDER — ALBUTEROL SULFATE 2.5 MG/.5ML
10 SOLUTION RESPIRATORY (INHALATION)
Status: COMPLETED | OUTPATIENT
Start: 2021-03-18 | End: 2021-03-18

## 2021-03-18 RX ORDER — ATORVASTATIN CALCIUM 20 MG/1
20 TABLET, FILM COATED ORAL
Status: DISCONTINUED | OUTPATIENT
Start: 2021-03-18 | End: 2021-03-20 | Stop reason: HOSPADM

## 2021-03-18 RX ORDER — LOSARTAN POTASSIUM 25 MG/1
25 TABLET ORAL DAILY
Status: DISCONTINUED | OUTPATIENT
Start: 2021-03-18 | End: 2021-03-19

## 2021-03-18 RX ORDER — ONDANSETRON 2 MG/ML
4 INJECTION INTRAMUSCULAR; INTRAVENOUS
Status: DISCONTINUED | OUTPATIENT
Start: 2021-03-18 | End: 2021-03-20 | Stop reason: HOSPADM

## 2021-03-18 RX ORDER — ENOXAPARIN SODIUM 100 MG/ML
40 INJECTION SUBCUTANEOUS DAILY
Status: DISCONTINUED | OUTPATIENT
Start: 2021-03-18 | End: 2021-03-20 | Stop reason: HOSPADM

## 2021-03-18 RX ORDER — ACETAMINOPHEN 650 MG/1
650 SUPPOSITORY RECTAL
Status: DISCONTINUED | OUTPATIENT
Start: 2021-03-18 | End: 2021-03-20 | Stop reason: HOSPADM

## 2021-03-18 RX ORDER — SODIUM CHLORIDE 0.9 % (FLUSH) 0.9 %
5-40 SYRINGE (ML) INJECTION AS NEEDED
Status: DISCONTINUED | OUTPATIENT
Start: 2021-03-18 | End: 2021-03-20 | Stop reason: HOSPADM

## 2021-03-18 RX ORDER — ALBUTEROL SULFATE 0.83 MG/ML
2.5 SOLUTION RESPIRATORY (INHALATION)
Status: DISCONTINUED | OUTPATIENT
Start: 2021-03-18 | End: 2021-03-20

## 2021-03-18 RX ORDER — CITALOPRAM 20 MG/1
20 TABLET, FILM COATED ORAL
Status: DISCONTINUED | OUTPATIENT
Start: 2021-03-18 | End: 2021-03-20

## 2021-03-18 RX ORDER — FUROSEMIDE 10 MG/ML
40 INJECTION INTRAMUSCULAR; INTRAVENOUS
Status: COMPLETED | OUTPATIENT
Start: 2021-03-18 | End: 2021-03-18

## 2021-03-18 RX ORDER — ACETAMINOPHEN 325 MG/1
650 TABLET ORAL
Status: DISCONTINUED | OUTPATIENT
Start: 2021-03-18 | End: 2021-03-20 | Stop reason: HOSPADM

## 2021-03-18 RX ORDER — PROMETHAZINE HYDROCHLORIDE 12.5 MG/1
12.5 TABLET ORAL
Status: DISCONTINUED | OUTPATIENT
Start: 2021-03-18 | End: 2021-03-20 | Stop reason: HOSPADM

## 2021-03-18 RX ORDER — MONTELUKAST SODIUM 10 MG/1
10 TABLET ORAL
Status: DISCONTINUED | OUTPATIENT
Start: 2021-03-18 | End: 2021-03-20 | Stop reason: HOSPADM

## 2021-03-18 RX ADMIN — METHYLPREDNISOLONE SODIUM SUCCINATE 40 MG: 40 INJECTION, POWDER, FOR SOLUTION INTRAMUSCULAR; INTRAVENOUS at 17:34

## 2021-03-18 RX ADMIN — ENOXAPARIN SODIUM 40 MG: 100 INJECTION SUBCUTANEOUS at 08:13

## 2021-03-18 RX ADMIN — ALBUTEROL SULFATE 2.5 MG: 2.5 SOLUTION RESPIRATORY (INHALATION) at 21:58

## 2021-03-18 RX ADMIN — CITALOPRAM HYDROBROMIDE 20 MG: 20 TABLET ORAL at 17:35

## 2021-03-18 RX ADMIN — FUROSEMIDE 40 MG: 10 INJECTION, SOLUTION INTRAMUSCULAR; INTRAVENOUS at 02:00

## 2021-03-18 RX ADMIN — Medication 10 ML: at 22:09

## 2021-03-18 RX ADMIN — METHYLPREDNISOLONE SODIUM SUCCINATE 125 MG: 125 INJECTION, POWDER, FOR SOLUTION INTRAMUSCULAR; INTRAVENOUS at 00:11

## 2021-03-18 RX ADMIN — ALBUTEROL SULFATE 10 MG: 2.5 SOLUTION RESPIRATORY (INHALATION) at 00:17

## 2021-03-18 RX ADMIN — ROFLUMILAST 500 MCG: 500 TABLET ORAL at 17:36

## 2021-03-18 RX ADMIN — METHYLPREDNISOLONE SODIUM SUCCINATE 40 MG: 40 INJECTION, POWDER, FOR SOLUTION INTRAMUSCULAR; INTRAVENOUS at 06:26

## 2021-03-18 RX ADMIN — Medication 81 MG: at 17:35

## 2021-03-18 RX ADMIN — ATORVASTATIN CALCIUM 20 MG: 20 TABLET, FILM COATED ORAL at 21:58

## 2021-03-18 RX ADMIN — MONTELUKAST SODIUM 10 MG: 10 TABLET, COATED ORAL at 21:58

## 2021-03-18 RX ADMIN — ACETAMINOPHEN 650 MG: 325 TABLET ORAL at 21:58

## 2021-03-18 RX ADMIN — ARFORMOTEROL TARTRATE: 15 SOLUTION RESPIRATORY (INHALATION) at 21:58

## 2021-03-18 RX ADMIN — MAGNESIUM SULFATE HEPTAHYDRATE 2 G: 2 INJECTION, SOLUTION INTRAVENOUS at 00:14

## 2021-03-18 RX ADMIN — LOSARTAN POTASSIUM 25 MG: 25 TABLET, FILM COATED ORAL at 17:35

## 2021-03-18 NOTE — ED NOTES
Bedside shift change report given to Sugar Lepe (oncoming nurse) by Trey WAGGONER (offgoing nurse). Report included the following information SBAR, Kardex, ED Summary, Intake/Output, MAR, Accordion and Recent Results.

## 2021-03-18 NOTE — PROGRESS NOTES
Patient seen and examined in follow-up from admission earlier today. I originally had seen her earlier this morning when she was on BiPAP. At that time she was responsive but minimally so. She has since woken up and had pulled off BiPAP and is now on nasal cannula. Per nursing staff she has been having outbursts whereupon she is walking out of the ER into the waiting room, eating food off other patient's trays, and being verbally abusive towards staff. At this time she is resting calmly and is agreeable to staying in her room and keeping oxygen in place. Earlier this morning upon admission she had stated that she did not want to be intubated but now she states that she changes her mind as she realizes that she would potentially die if she were not intubated if she were in respiratory distress. She wants this to be reflected in her notes and would like me to change the CODE STATUS. Continue with oxygen supplementation to maintain sats greater than 90% she may need to restart BiPAP if she can if she decompensates again. Continue with IV steroids as well as duo nebs. Continue with home medications. CBC, BMP in a.m. Lovenox for DVT prophylaxis. We will order Magic mouthwash for ulcerations in her mouth  Valtrex p.o. twice daily for herpes labialis. Extensively discussed with nursing staff as well as ER physician. The patient does agree to stay in the hospital and agrees to stay in her room at this time.

## 2021-03-18 NOTE — ED NOTES
Received shift report form Zakia Wolfe RN. Patient alert and oriented x 4. Patient resting on the stretcher comfortably denies any chest pain, SOB, nausea, abdominal pain. Side rails up x 2. No obvious signs of distress noted. Patient able to follow commands.  Patient ambulatory upon arrival.

## 2021-03-18 NOTE — PROGRESS NOTES
Physician Progress Note      Brenda Esquivel  CSN #:                  [id number]  :                       1965  ADMIT DATE:       3/18/2021 12:06 AM  DISCH DATE:  RESPONDING  PROVIDER #:        SYED SOTO DO          QUERY TEXT:    Pt admitted with COPD Exacerbation. Pt noted to have elevated BP. If possible, please document in progress notes and discharge summary if you are evaluating and/or treating any of the following: The medical record reflects the following:  Risk Factors: 63 y.o. female with history of HTN, CHF, COPD, CKD  Clinical Indicators: /109, 184/87, 184/87, SOB  Treatment: Cozaar    Hypertensive Crisis, unspecified: at least 2 consecutive readings of SBP > 180 mmHg or DBP > 110 mmHg  - Hypertensive Urgency: Hypertensive crisis w/o associated organ dysfunction. S/s may or may not be present, but can include severe headache, SOB, epistaxis, severe anxiety. Tx: adjustment of oral antihypertensives; IV meds not usually required. - Hypertensive Emergency: Hypertensive crisis w/ associated organ damage (stroke, encephalopathy, DEMARCUS, MI, angina, acute or decompensated CHF, acute pulmonary edema, HELLP, etc.). Requires immediate treatment (usually IV meds) & possible ICU admission. Associated organ dysfunction needs documented. [email or web address]    Thank you,  Raquel Huertas RN, W  [phone]  Options provided:  -- Hypertensive Urgency  -- Hypertensive Crisis  -- Hypertensive Emergency  -- Other - I will add my own diagnosis  -- Disagree - Not applicable / Not valid  -- Disagree - Clinically unable to determine / Unknown  -- Refer to Clinical Documentation Reviewer    PROVIDER RESPONSE TEXT:    This patient is in hypertensive emergency.     Query created by: Jadiel Nelson on 3/18/2021 11:18 AM      Electronically signed by:  My Retana DO 3/18/2021 4:57 PM

## 2021-03-18 NOTE — PROGRESS NOTES
2nd day that msg left on vm for to call & rs 3/30/21 appt w/mp - if she wants to stay w/mp it needs to be 30mn appt - she can yanni llanos w/lz if she chooses - both drs consulted in [facility]  3rd day msg lft for pt to call & rs 3/30/31 appt - mailed lttr djsoo

## 2021-03-18 NOTE — H&P
Hospitalist Admission Note    NAME: Juanpablo Franklin   :  1965   MRN:  [id number]     Date:  3/18/2021     Patient PCP: Betzy Swan MD  ________________________________________________________________________    My assessment of this patient's clinical condition and my plan of care is as follows. Assessment / Plan:  1. COPD with acute exacerbation  2. Acute respiratory failure secondary to above  3. Hypertension  4. Polysubstance abuse history, UDS positive for cocaine this admission  5. Medical noncompliance  6. Tobacco use    1. Admit to medical stepdown with telemetry monitoring. 2. Continue BiPAP for now, wean as tolerated. 3. IV steroid and scheduled bronchodilators. 4. Resume home meds as appropriate. 5. Further plan pending response to current management and overall clinical course. Code Status: Full  DVT Prophylaxis: Lovenox          Subjective:   CHIEF COMPLAINT: Shortness of breath and wheezing    HISTORY OF PRESENT ILLNESS:     Jassi Nicole is a 63 y.o.  female who presents to the emergency department this morning complaining of worsening shortness of breath and wheezing. Patient was just recently admitted here but left the hospital [address] on 3/12 after being admitted 2 days prior with similar symptoms with which she presents tonight. On arrival to the emergency department patient was severely short of breath and close to requiring intubation per ED physician report. When the patient was told that she may need intubation she refused and said that she did not want intubation because she was told that she may never come off of ventilator if she got put on one. Patient was placed on BiPAP instead and treated with multiple medications in the attempt to reduce bronchospasm. She actually responded very well and feels significantly better overall although she continues to require BiPAP.   Patient is being referred for hospital admission for inpatient management of acute COPD exacerbation. Past Medical History:   Diagnosis Date    CHF (congestive heart failure) ([facility])     Chronic respiratory failure with hypoxia ([address] 9/7/2020    CKD (chronic kidney disease) stage 3, GFR 30-59 ml/min 10/31/2019    Cocaine abuse ([address] 11/26/2017    COPD (chronic obstructive pulmonary disease) with chronic bronchitis ([address] 12/19/2017    Dependence on supplemental oxygen     Depression 3/22/2020    Drug-seeking behavior 11/19/2016    Endocrine disease     thyroid issues    Essential hypertension 3/10/2017    Fe deficiency anemia 10/27/2012    Fibromyalgia 12/16/2016    Gastroesophageal reflux disease without esophagitis 10/10/2016    Gastrointestinal disorder     \"blockage in my stomach\"    Hypercholesterolemia     Hypertension     Hypertensive heart failure ([address] 12/19/2017    Morbid obesity due to excess calories ([address] 3/10/2017    NSTEMI (non-ST elevated myocardial infarction) ([address] 3/22/2020    On home O2 12/3/2015    Overview:  2L at home per pt for approx 8 years    Polysubstance abuse ([address] 9/15/2018    Respiratory failure ([address] 1/11/2017    S/P hernia repair 10/31/2019    Sleep apnea 12/19/2017    T wave inversion in EKG 3/9/2019    Tobacco use 2/44/3849    Uncomplicated severe persistent asthma 12/13/2016    Vocal cord disease 12/7/2016        Past Surgical History:   Procedure Laterality Date    HX GI      Exploratory laparotomy with lysis of adhesions and primary repair of incarcerated umbilical hernia       Social History     Tobacco Use    Smoking status: Former Smoker     Packs/day: 0.25    Smokeless tobacco: Current User    Tobacco comment: Pt states she has not had money to buy cigarettes in the past month   Substance Use Topics    Alcohol use: No     Comment: socially        No family history on file. No Known Allergies     Prior to Admission medications    Medication Sig Start Date End Date Taking?  Authorizing Provider predniSONE (DELTASONE) 20 mg tablet Take 60 mg by mouth daily for 5 days. With Breakfast 3/15/21 3/20/21  Loreto Mehta DO   albuterol (PROVENTIL VENTOLIN) 2.5 mg /3 mL (0.083 %) nebu 1 nebulizer treatment every 4 hours while awake for 1 week then every 4 hours as needed for shortness of breath 3/1/21   Tigre Cornell MD   cloNIDine HCL (CATAPRES) 0.2 mg tablet Take 1 Tab by mouth every twelve (12) hours. 3/1/21   Tigre Cornell MD   famotidine (PEPCID) 20 mg tablet Take 1 Tab by mouth nightly. 3/1/21   Tigre Cornell MD   losartan (COZAAR) 25 mg tablet Take 1 Tab by mouth daily. Indications: high blood pressure 3/2/21   Tigre Cornell MD   Nebulizer & Compressor machine 1 Each by Does Not Apply route as needed for Wheezing or Shortness of Breath. 1/29/21   Larry Hahn MD   fluticasone furoate-vilanteroL (Breo Ellipta) 200-25 mcg/dose inhaler Take 1 Puff by inhalation daily. Indications: controller medication for asthma 1/18/21   Audie Smith MD   nitroglycerin (NITROSTAT) 0.4 mg SL tablet Take 1 Tab by mouth every five (5) minutes as needed for Chest Pain. Sit/Lay down then put one tab under the tongue every 5 minutes as needed for chest pain for 3 doses 12/31/20   Marie Solomon MD   aspirin delayed-release 81 mg tablet Take 1 Tab by mouth daily. 12/30/20   Marie Solomon MD   montelukast (SINGULAIR) 10 mg tablet Take 1 Tab by mouth nightly. Indications: controller medication for asthma 12/25/20   Thania Painting MD   roflumilast (DALIRESP) 500 mcg tab tablet Take 1 Tab by mouth daily. 12/25/20   Thania Painting MD   citalopram (CeleXA) 20 mg tablet Take 1 Tab by mouth every morning. 11/30/20   Thania Painting MD   OXYGEN-AIR DELIVERY SYSTEMS 3 L by IntraNASal route as needed for Other (sob). Provider, Historical   atorvastatin (LIPITOR) 20 mg tablet Take 1 Tab by mouth nightly.  3/14/19   Meghann Day MD   naloxone [facility]) 4 mg/actuation nasal spray Use 1 spray intranasally, then discard. Repeat with new spray every 2 min as needed for opioid overdose symptoms, alternating nostrils. 3/14/19   Sury Dukes MD       REVIEW OF SYSTEMS:     Total of 12 systems reviewed as follows:       POSITIVE= bolded text  Negative = text not underlined  General:  fever, chills, sweats, generalized weakness, weight loss/gain,      loss of appetite, malaise  Eyes:    blurred vision, eye pain, loss of vision, double vision  ENT:    rhinorrhea, pharyngitis   Respiratory:   cough, sputum production, SOB, ARAGON, wheezing, pleuritic pain   Cardiology:   chest pain, palpitations, orthopnea, PND, edema, syncope   Gastrointestinal:  abdominal pain , N/V, diarrhea, dysphagia, constipation, bleeding   Genitourinary:  frequency, urgency, dysuria, hematuria, incontinence   Muskuloskeletal :  arthralgia, myalgia, back pain  Hematology:  easy bruising, nose or gum bleeding, lymphadenopathy   Dermatological: rash, ulceration, pruritis, color change / jaundice  Endocrine:   hot flashes or polydipsia   Neurological:  headache, dizziness, confusion, focal weakness, paresthesia,     Speech difficulties, memory loss, gait difficulty  Psychological: Feelings of anxiety, depression, agitation    Objective:   VITALS:    Visit Vitals  BP (!) 142/69   Pulse 87   Temp 97.9 °F (36.6 °C)   Resp 18   SpO2 100%       PHYSICAL EXAM:    General:    In NAD. Nontoxic-appearing. BiPap mask in place. HEENT: Head NCAT. Pupils equal round sclerae anicteric, EOMI. Neck:  Supple, trachea midline. Lungs:   Markedly diminished breath sounds bilaterally, end expiratory wheezing present. No accessory muscle use currently. Chest wall:  No chest wall tenderness. Chest expansion equal and symmetrical bilaterally. Heart:   RRR. Abdomen:   Soft, NT/ND. Bowel sounds normoactive. Extremities: Warm, no edema. No evidence for ischemia. Skin:     Not pale. Not Jaundiced  No rashes   Psych:  Mood normal.  Calm, no agitation.   Neurologic: Awake but sleepy. Moves extremities spontaneously. _______________________________________________________________________  Care Plan discussed with:    Comments   Patient X    Family      RN X    Care Manager                    Consultant:      _______________________________________________________________________  Expected  Disposition:   Home  X   HH/PT/OT/RN    SNF/LTC    ARU    ________________________________________________________________________    Tests/Procedures:   CXR:  Official radiology read pending. LAB DATA REVIEWED:    Recent Results (from the past 24 hour(s))   CBC WITH AUTOMATED DIFF    Collection Time: 03/18/21 12:07 AM   Result Value Ref Range    WBC 12.9 4.6 - 13.2 K/uL    RBC 4.24 4.20 - 5.30 M/uL    HGB 12.3 12.0 - 16.0 g/dL    HCT 39.0 35.0 - 45.0 %    MCV 92.0 74.0 - 97.0 FL    MCH 29.0 24.0 - 34.0 PG    MCHC 31.5 31.0 - 37.0 g/dL    RDW 14.4 11.6 - 14.5 %    PLATELET 904 350 - 219 K/uL    MPV 10.5 9.2 - 11.8 FL    NEUTROPHILS 71 40 - 73 %    LYMPHOCYTES 20 (L) 21 - 52 %    MONOCYTES 7 3 - 10 %    EOSINOPHILS 2 0 - 5 %    BASOPHILS 0 0 - 2 %    ABS. NEUTROPHILS 9.1 (H) 1.8 - 8.0 K/UL    ABS. LYMPHOCYTES 2.6 0.9 - 3.6 K/UL    ABS. MONOCYTES 0.9 0.05 - 1.2 K/UL    ABS. EOSINOPHILS 0.3 0.0 - 0.4 K/UL    ABS. BASOPHILS 0.0 0.0 - 0.1 K/UL    DF AUTOMATED     METABOLIC PANEL, COMPREHENSIVE    Collection Time: 03/18/21 12:07 AM   Result Value Ref Range    Sodium 144 136 - 145 mmol/L    Potassium 4.0 3.5 - 5.5 mmol/L    Chloride 108 100 - 111 mmol/L    CO2 32 21 - 32 mmol/L    Anion gap 4 3.0 - 18 mmol/L    Glucose 105 (H) 74 - 99 mg/dL    BUN 20 (H) 7.0 - 18 MG/DL    Creatinine 0.84 0.6 - 1.3 MG/DL    BUN/Creatinine ratio 24 (H) 12 - 20      GFR est AA >60 >60 ml/min/1.73m2    GFR est non-AA >60 >60 ml/min/1.73m2    Calcium 8.6 8.5 - 10.1 MG/DL    Bilirubin, total 0.5 0.2 - 1.0 MG/DL    ALT (SGPT) 32 13 - 56 U/L    AST (SGOT) 17 10 - 38 U/L    Alk.  phosphatase 80 45 - 117 U/L    Protein, total 7.0 6.4 - 8.2 g/dL    Albumin 3.7 3.4 - 5.0 g/dL    Globulin 3.3 2.0 - 4.0 g/dL    A-G Ratio 1.1 0.8 - 1.7     MAGNESIUM    Collection Time: 03/18/21 12:07 AM   Result Value Ref Range    Magnesium 2.2 1.6 - 2.6 mg/dL   PROTHROMBIN TIME + INR    Collection Time: 03/18/21 12:07 AM   Result Value Ref Range    Prothrombin time 14.1 11.5 - 15.2 sec    INR 1.1 0.8 - 1.2     PTT    Collection Time: 03/18/21 12:07 AM   Result Value Ref Range    aPTT 28.2 23.0 - 36.4 SEC   CARDIAC PANEL,(CK, CKMB & TROPONIN)    Collection Time: 03/18/21 12:07 AM   Result Value Ref Range    CK - MB 4.7 (H) <3.6 ng/ml    CK-MB Index 4.3 (H) 0.0 - 4.0 %     26 - 192 U/L    Troponin-I, QT 0.07 (H) 0.0 - 0.045 NG/ML   NT-PRO BNP    Collection Time: 03/18/21 12:07 AM   Result Value Ref Range    NT pro-BNP 4,548 (H) 0 - 900 PG/ML   TSH 3RD GENERATION    Collection Time: 03/18/21 12:07 AM   Result Value Ref Range    TSH 0.24 (L) 0.36 - 3.74 uIU/mL   T4, FREE    Collection Time: 03/18/21 12:07 AM   Result Value Ref Range    T4, Free 1.5 0.7 - 1.5 NG/DL   POC G3    Collection Time: 03/18/21 12:23 AM   Result Value Ref Range    Device: BIPAP      FIO2 (POC) 65 %    pH (POC) 7.30 (L) 7.35 - 7.45      pCO2 (POC) 76.3 (H) 35.0 - 45.0 MMHG    pO2 (POC) 291 (H) 80 - 100 MMHG    HCO3 (POC) 37.1 (H) 22 - 26 MMOL/L    sO2 (POC) 100 (H) 92 - 97 %    Base excess (POC) 8 mmol/L    PEEP/CPAP (POC) 5 cmH2O    PIP (POC) 16      Pressure support 11 cmH2O    Allens test (POC) YES      Total resp. rate 16      Site LEFT RADIAL      Patient temp.  98.6      Specimen type (POC) ARTERIAL      Performed by Marily Brittle    EKG, 12 LEAD, INITIAL    Collection Time: 03/18/21 12:23 AM   Result Value Ref Range    Ventricular Rate 82 BPM    Atrial Rate 82 BPM    P-R Interval 124 ms    QRS Duration 118 ms    Q-T Interval 438 ms    QTC Calculation (Bezet) 511 ms    Calculated P Axis 51 degrees    Calculated R Axis -53 degrees    Calculated T Axis 88 degrees Diagnosis       Age and gender specific ECG analysis  Normal sinus rhythm  Possible Left atrial enlargement  Left axis deviation  Inferior infarct , age undetermined  Anteroseptal infarct , possibly acute  T wave abnormality, consider lateral ischemia  Prolonged QT  ** ** ACUTE MI / STEMI ** **  Abnormal ECG  When compared with ECG of 14-MAR-2021 19:53,  Left bundle branch block is no longer present  Anteroseptal infarct is now present  Inferior infarct is now present     URINALYSIS W/ RFLX MICROSCOPIC    Collection Time: 03/18/21  2:23 AM   Result Value Ref Range    Color YELLOW      Appearance CLEAR      Specific gravity 1.016 1.005 - 1.030      pH (UA) 6.5 5.0 - 8.0      Protein 100 (A) NEG mg/dL    Glucose Negative NEG mg/dL    Ketone Negative NEG mg/dL    Bilirubin Negative NEG      Blood Negative NEG      Urobilinogen 0.2 0.2 - 1.0 EU/dL    Nitrites Negative NEG      Leukocyte Esterase Negative NEG     DRUG SCREEN, URINE    Collection Time: 03/18/21  2:23 AM   Result Value Ref Range    BENZODIAZEPINES Negative NEG      BARBITURATES Negative NEG      THC (TH-CANNABINOL) Negative NEG      OPIATES Positive (A) NEG      PCP(PHENCYCLIDINE) Negative NEG      COCAINE Positive (A) NEG      AMPHETAMINES Negative NEG      METHADONE Negative NEG      HDSCOM (NOTE)    URINE MICROSCOPIC ONLY    Collection Time: 03/18/21  2:23 AM   Result Value Ref Range    WBC 0 to 2 0 - 5 /hpf       Jason Cárdenas MD  [address] [place], [place] [address]      Disclaimer: Sections of this note are dictated utilizing voice recognition software and minor typographical errors may be present. If questions arise, please do not hesitate to contact me or call our department.

## 2021-03-18 NOTE — PROGRESS NOTES
Pt removed her bipap and was screaming about pain in her lips. I got her some mouth moisturizer and convinced her to put it back on. She threatened to leave AMA several times. \" You dont know me, I'm the kind of person to just get up out of here and walk out right now if I want. \"    I gently reminded her that when she came in she was very low oxygen and needed intubation; and I would strongly discourage her leaving AMA. She became irate and informed me NO ONE is to intubate her for ANY reason AT ALL. She put it back on and and I left the room to call her two sisters. I called; [phone], [phone]- Left a VM    I called [phone]; left a VM as well.     Jody Rendon

## 2021-03-18 NOTE — ADT AUTH CERT NOTES
URGENT REQUEST: 
  
REF #[id number] PLEASE SEND FAX OR CALL WITH UPDATE OF DAYS APPROVED AND STATUS OF P2P DC SUMMARY HAS BEEN SUBMITTED ON  
  
[facility] 
  
[phone] [facility] 
[phone] PHONE

## 2021-03-18 NOTE — PROGRESS NOTES
Pt is refusing EKG at this time; will continue to encourage her to allow me. Pt is alert, curls up and refuses to allow me to put on stickers. Pt is aware of the doc order and reason for EKG.     Guille Mata

## 2021-03-18 NOTE — ED PROVIDER NOTES
Claudio Corbett is a 63 y.o. female with extensive past medical history including hypertension, CHF, hyperlipidemia, COPD on home O2, CKD, CAD, and cocaine abuse coming in with shortness of breath. EMS reports that when they arrived patient was in severe respiratory stress with O2 sats in the upper 80s. Stated that she was not moving hardly any air at all and was in severe distress. Her blood pressure was extremely high and I applied nitroglycerin paste and patient received 1 DuoNeb. She was placed on CPAP and brought here. History otherwise limited due to patient's severe respiratory distress.            Past Medical History:   Diagnosis Date    CHF (congestive heart failure) ([facility])     Chronic respiratory failure with hypoxia ([address] 9/7/2020    CKD (chronic kidney disease) stage 3, GFR 30-59 ml/min 10/31/2019    Cocaine abuse ([address] 11/26/2017    COPD (chronic obstructive pulmonary disease) with chronic bronchitis ([address] 12/19/2017    Dependence on supplemental oxygen     Depression 3/22/2020    Drug-seeking behavior 11/19/2016    Endocrine disease     thyroid issues    Essential hypertension 3/10/2017    Fe deficiency anemia 10/27/2012    Fibromyalgia 12/16/2016    Gastroesophageal reflux disease without esophagitis 10/10/2016    Gastrointestinal disorder     \"blockage in my stomach\"    Hypercholesterolemia     Hypertension     Hypertensive heart failure ([address] 12/19/2017    Morbid obesity due to excess calories ([address] 3/10/2017    NSTEMI (non-ST elevated myocardial infarction) ([address] 3/22/2020    On home O2 12/3/2015    Overview:  2L at home per pt for approx 8 years    Polysubstance abuse ([address] 9/15/2018    Respiratory failure ([address] 1/11/2017    S/P hernia repair 10/31/2019    Sleep apnea 12/19/2017    T wave inversion in EKG 3/9/2019    Tobacco use 7/35/7245    Uncomplicated severe persistent asthma 12/13/2016    Vocal cord disease 12/7/2016       Past Surgical History:   Procedure Laterality Date    HX GI      Exploratory laparotomy with lysis of adhesions and primary repair of incarcerated umbilical hernia         No family history on file. Social History     Socioeconomic History    Marital status: SINGLE     Spouse name: Not on file    Number of children: Not on file    Years of education: Not on file    Highest education level: Not on file   Occupational History    Not on file   Social Needs    Financial resource strain: Not on file    Food insecurity     Worry: Not on file     Inability: Not on file    Transportation needs     Medical: Not on file     Non-medical: Not on file   Tobacco Use    Smoking status: Former Smoker     Packs/day: 0.25    Smokeless tobacco: Current User    Tobacco comment: Pt states she has not had money to buy cigarettes in the past month   Substance and Sexual Activity    Alcohol use: No     Comment: socially    Drug use: Not Currently     Types: Heroin     Comment: pt reports using approximately a month ago    Sexual activity: Not Currently     Comment: last used 9 years ago   Lifestyle    Physical activity     Days per week: Not on file     Minutes per session: Not on file    Stress: Not on file   Relationships    Social connections     Talks on phone: Not on file     Gets together: Not on file     Attends [employer] service: Not on file     Active member of club or organization: Not on file     Attends meetings of clubs or organizations: Not on file     Relationship status: Not on file    Intimate partner violence     Fear of current or ex partner: Not on file     Emotionally abused: Not on file     Physically abused: Not on file     Forced sexual activity: Not on file   Other Topics Concern    Not on file   Social History Narrative    Not on file         ALLERGIES: Patient has no known allergies.     Review of Systems   Unable to perform ROS: Severe respiratory distress       Vitals:    03/18/21 0300 03/18/21 0315 03/18/21 0330 03/18/21 0345   BP: 125/75 (!) 154/71 (!) 140/74 (!) 142/69   Pulse: 84 88 87 87   Resp: 16 18 14 18   Temp:       SpO2: 100% 100% 100% 100%            Physical Exam  Vitals signs reviewed. Constitutional:       General: She is in acute distress. Appearance: She is ill-appearing. Comments: Awake, but in severe respiratory distress. Able to nod to make eye contact occasionally. HENT:      Head: Normocephalic and atraumatic. Mouth/Throat:      Comments: CPAP mask in place  Eyes:      Extraocular Movements: Extraocular movements intact. Pupils: Pupils are equal, round, and reactive to light. Neck:      Musculoskeletal: Normal range of motion. No neck rigidity. Cardiovascular:      Rate and Rhythm: Normal rate and regular rhythm. Heart sounds: No murmur. No friction rub. No gallop. Pulmonary:      Effort: Tachypnea, accessory muscle usage, prolonged expiration, respiratory distress and retractions present. Comments: Decreased air movement throughout all lung fields with occasional expiratory wheezing. Abdominal:      Palpations: Abdomen is soft. Tenderness: There is no abdominal tenderness. Musculoskeletal:         General: No tenderness or deformity. Skin:     General: Skin is warm. Neurological:      General: No focal deficit present. Mental Status: She is alert. Comments: Patient occasionally making eye contact and interacting. Spontaneously moves both upper extremities. No focal deficits. MDM  Number of Diagnoses or Management Options  Acute on chronic respiratory failure with hypoxia and hypercapnia (HCC)  Cocaine abuse (HCC)  Elevated troponin  Diagnosis management comments: Rosnia Long is a 63 y.o. female with both severe CHF and COPD on home oxygen coming in in severe respiratory distress. Broad differential including COPD exacerbation, CHF exacerbation, infectious etiology, pulmonary embolism, among others. Will initiate broad work-up. Plan was to initially intubate the patient due to the severe distress and decreased level of consciousness, however patient refused and started tolerating BiPAP better. Will give short trial of BiPAP and if not improving will plan for intubation. Patient did improve significantly on BiPAP. Will be able to avoid intubation at this time. Her UDS was positive for cocaine, I suspect that this is likely etiology of her acute exacerbation. This may also be the reason for her mildly elevated troponin. Do not feel that this is consistent with an acute ST elevation MI. She does have a chronic left bundle branch block. Not a good candidate for emergent PCI at this time anyway. We will plan for admission to the stepdown unit.          Procedures      Vitals:  Patient Vitals for the past 12 hrs:   Temp Pulse Resp BP SpO2   03/18/21 0345  87 18 (!) 142/69 100 %   03/18/21 0330  87 14 (!) 140/74 100 %   03/18/21 0315  88 18 (!) 154/71 100 %   03/18/21 0300  84 16 125/75 100 %   03/18/21 0245 97.9 °F (36.6 °C) 83 23 (!) 145/69 100 %   03/18/21 0230  84 16 127/67 100 %   03/18/21 0215  72 15 (!) 160/84 100 %   03/18/21 0200  81 16 (!) 158/72 100 %   03/18/21 0145  85 17 (!) 142/66 100 %   03/18/21 0130  85 14 (!) 147/65 100 %   03/18/21 0115  87 16 (!) 156/65 100 %   03/18/21 0100  93 19 (!) 151/75 100 %   03/18/21 0045  97 18 (!) 152/66 100 %   03/18/21 0017  83  (!) 184/87    03/18/21 0015  84 18 (!) 184/87 100 %   03/18/21 0007     100 %   03/18/21 0004  90 17 (!) 247/109 100 %       Medications ordered:   Medications   methylPREDNISolone (PF) (Solu-MEDROL) injection 125 mg (125 mg IntraVENous Given 3/18/21 0011)   magnesium sulfate 2 g/50 ml IVPB (premix or compounded) (0 g IntraVENous IV Completed 3/18/21 0114)   albuterol CONCENTRATE 2.5mg/0.5 mL neb soln (10 mg Nebulization Given 3/18/21 0017)   furosemide (LASIX) injection 40 mg (40 mg IntraVENous Given 3/18/21 0200)         Lab findings:  Recent Results (from the past 12 hour(s))   CBC WITH AUTOMATED DIFF    Collection Time: 03/18/21 12:07 AM   Result Value Ref Range    WBC 12.9 4.6 - 13.2 K/uL    RBC 4.24 4.20 - 5.30 M/uL    HGB 12.3 12.0 - 16.0 g/dL    HCT 39.0 35.0 - 45.0 %    MCV 92.0 74.0 - 97.0 FL    MCH 29.0 24.0 - 34.0 PG    MCHC 31.5 31.0 - 37.0 g/dL    RDW 14.4 11.6 - 14.5 %    PLATELET 587 762 - 097 K/uL    MPV 10.5 9.2 - 11.8 FL    NEUTROPHILS 71 40 - 73 %    LYMPHOCYTES 20 (L) 21 - 52 %    MONOCYTES 7 3 - 10 %    EOSINOPHILS 2 0 - 5 %    BASOPHILS 0 0 - 2 %    ABS. NEUTROPHILS 9.1 (H) 1.8 - 8.0 K/UL    ABS. LYMPHOCYTES 2.6 0.9 - 3.6 K/UL    ABS. MONOCYTES 0.9 0.05 - 1.2 K/UL    ABS. EOSINOPHILS 0.3 0.0 - 0.4 K/UL    ABS. BASOPHILS 0.0 0.0 - 0.1 K/UL    DF AUTOMATED     METABOLIC PANEL, COMPREHENSIVE    Collection Time: 03/18/21 12:07 AM   Result Value Ref Range    Sodium 144 136 - 145 mmol/L    Potassium 4.0 3.5 - 5.5 mmol/L    Chloride 108 100 - 111 mmol/L    CO2 32 21 - 32 mmol/L    Anion gap 4 3.0 - 18 mmol/L    Glucose 105 (H) 74 - 99 mg/dL    BUN 20 (H) 7.0 - 18 MG/DL    Creatinine 0.84 0.6 - 1.3 MG/DL    BUN/Creatinine ratio 24 (H) 12 - 20      GFR est AA >60 >60 ml/min/1.73m2    GFR est non-AA >60 >60 ml/min/1.73m2    Calcium 8.6 8.5 - 10.1 MG/DL    Bilirubin, total 0.5 0.2 - 1.0 MG/DL    ALT (SGPT) 32 13 - 56 U/L    AST (SGOT) 17 10 - 38 U/L    Alk.  phosphatase 80 45 - 117 U/L    Protein, total 7.0 6.4 - 8.2 g/dL    Albumin 3.7 3.4 - 5.0 g/dL    Globulin 3.3 2.0 - 4.0 g/dL    A-G Ratio 1.1 0.8 - 1.7     MAGNESIUM    Collection Time: 03/18/21 12:07 AM   Result Value Ref Range    Magnesium 2.2 1.6 - 2.6 mg/dL   PROTHROMBIN TIME + INR    Collection Time: 03/18/21 12:07 AM   Result Value Ref Range    Prothrombin time 14.1 11.5 - 15.2 sec    INR 1.1 0.8 - 1.2     PTT    Collection Time: 03/18/21 12:07 AM   Result Value Ref Range    aPTT 28.2 23.0 - 36.4 SEC   CARDIAC PANEL,(CK, CKMB & TROPONIN)    Collection Time: 03/18/21 12:07 AM   Result Value Ref Range    CK - MB 4.7 (H) <3.6 ng/ml    CK-MB Index 4.3 (H) 0.0 - 4.0 %     26 - 192 U/L    Troponin-I, QT 0.07 (H) 0.0 - 0.045 NG/ML   NT-PRO BNP    Collection Time: 03/18/21 12:07 AM   Result Value Ref Range    NT pro-BNP 4,548 (H) 0 - 900 PG/ML   TSH 3RD GENERATION    Collection Time: 03/18/21 12:07 AM   Result Value Ref Range    TSH 0.24 (L) 0.36 - 3.74 uIU/mL   T4, FREE    Collection Time: 03/18/21 12:07 AM   Result Value Ref Range    T4, Free 1.5 0.7 - 1.5 NG/DL   POC G3    Collection Time: 03/18/21 12:23 AM   Result Value Ref Range    Device: BIPAP      FIO2 (POC) 65 %    pH (POC) 7.30 (L) 7.35 - 7.45      pCO2 (POC) 76.3 (H) 35.0 - 45.0 MMHG    pO2 (POC) 291 (H) 80 - 100 MMHG    HCO3 (POC) 37.1 (H) 22 - 26 MMOL/L    sO2 (POC) 100 (H) 92 - 97 %    Base excess (POC) 8 mmol/L    PEEP/CPAP (POC) 5 cmH2O    PIP (POC) 16      Pressure support 11 cmH2O    Allens test (POC) YES      Total resp. rate 16      Site LEFT RADIAL      Patient temp.  98.6      Specimen type (POC) ARTERIAL      Performed by Shaina Mini    EKG, 12 LEAD, INITIAL    Collection Time: 03/18/21 12:23 AM   Result Value Ref Range    Ventricular Rate 82 BPM    Atrial Rate 82 BPM    P-R Interval 124 ms    QRS Duration 118 ms    Q-T Interval 438 ms    QTC Calculation (Bezet) 511 ms    Calculated P Axis 51 degrees    Calculated R Axis -53 degrees    Calculated T Axis 88 degrees    Diagnosis       Age and gender specific ECG analysis  Normal sinus rhythm  Possible Left atrial enlargement  Left axis deviation  Inferior infarct , age undetermined  Anteroseptal infarct , possibly acute  T wave abnormality, consider lateral ischemia  Prolonged QT  ** ** ACUTE MI / STEMI ** **  Abnormal ECG  When compared with ECG of 14-MAR-2021 19:53,  Left bundle branch block is no longer present  Anteroseptal infarct is now present  Inferior infarct is now present     URINALYSIS W/ RFLX MICROSCOPIC    Collection Time: 03/18/21  2:23 AM Result Value Ref Range    Color YELLOW      Appearance CLEAR      Specific gravity 1.016 1.005 - 1.030      pH (UA) 6.5 5.0 - 8.0      Protein 100 (A) NEG mg/dL    Glucose Negative NEG mg/dL    Ketone Negative NEG mg/dL    Bilirubin Negative NEG      Blood Negative NEG      Urobilinogen 0.2 0.2 - 1.0 EU/dL    Nitrites Negative NEG      Leukocyte Esterase Negative NEG     DRUG SCREEN, URINE    Collection Time: 03/18/21  2:23 AM   Result Value Ref Range    BENZODIAZEPINES Negative NEG      BARBITURATES Negative NEG      THC (TH-CANNABINOL) Negative NEG      OPIATES Positive (A) NEG      PCP(PHENCYCLIDINE) Negative NEG      COCAINE Positive (A) NEG      AMPHETAMINES Negative NEG      METHADONE Negative NEG      HDSCOM (NOTE)    URINE MICROSCOPIC ONLY    Collection Time: 03/18/21  2:23 AM   Result Value Ref Range    WBC 0 to 2 0 - 5 /hpf       EKG interpretation by ED Physician: Sinus rhythm rate of 81 bpm.  Left bundle branch block with repolarization abnormality consistent with prior EKGs. X-Ray, CT or other radiology findings or impressions:  XR CHEST PORT    (Results Pending)   No acute process. Progress notes, Consult notes or additional Procedure notes:     Consult: Spoke to Dr. Luz Marina Gibson, hospitalist, regarding patient's symptoms, presentation, clinical course, need for admission. He knows the patient well and agrees to admit to the stepdown unit on BiPAP for further respiratory work-up and treatment. Reevaluation of patient:   I have reassessed the patient. Patient is doing much better. Her respiratory rate and blood pressure both greatly improved. Discussed results and need for admission. History limited but patient nods in understanding. CRITICAL CARE:  4:01 AM  I have spent 60 minutes of critical care time involved in lab review, consultations with specialist, family decision-making, and documentation.   During this entire length of time I was immediately available to the patient. Critical Care: The reason for providing this level of medical care for this critically ill patient was due a critical illness that impaired one or more vital organ systems such that there was a high probability of imminent or life threatening deterioration in the patients condition. This care involved high complexity decision making to assess, manipulate, and support vital system functions, to treat this degreee vital organ system failure and to prevent further life threatening deterioration of the patients condition. Disposition:  Diagnosis:   1. Acute on chronic respiratory failure with hypoxia and hypercapnia (HCC)    2. Cocaine abuse (HCC)    3. Elevated troponin        Disposition: Admit to stepdown unit    Follow-up Information    None          Patient's Medications   Start Taking    No medications on file   Continue Taking    ALBUTEROL (PROVENTIL VENTOLIN) 2.5 MG /3 ML (0.083 %) NEBU    1 nebulizer treatment every 4 hours while awake for 1 week then every 4 hours as needed for shortness of breath    ASPIRIN DELAYED-RELEASE 81 MG TABLET    Take 1 Tab by mouth daily. ATORVASTATIN (LIPITOR) 20 MG TABLET    Take 1 Tab by mouth nightly. CITALOPRAM (CELEXA) 20 MG TABLET    Take 1 Tab by mouth every morning. CLONIDINE HCL (CATAPRES) 0.2 MG TABLET    Take 1 Tab by mouth every twelve (12) hours. FAMOTIDINE (PEPCID) 20 MG TABLET    Take 1 Tab by mouth nightly. FLUTICASONE FUROATE-VILANTEROL (BREO ELLIPTA) 200-25 MCG/DOSE INHALER    Take 1 Puff by inhalation daily. Indications: controller medication for asthma    LOSARTAN (COZAAR) 25 MG TABLET    Take 1 Tab by mouth daily. Indications: high blood pressure    MONTELUKAST (SINGULAIR) 10 MG TABLET    Take 1 Tab by mouth nightly. Indications: controller medication for asthma    NALOXONE (NARCAN) 4 MG/ACTUATION NASAL SPRAY    Use 1 spray intranasally, then discard.  Repeat with new spray every 2 min as needed for opioid overdose symptoms, alternating nostrils. NEBULIZER & COMPRESSOR MACHINE    1 Each by Does Not Apply route as needed for Wheezing or Shortness of Breath. NITROGLYCERIN (NITROSTAT) 0.4 MG SL TABLET    Take 1 Tab by mouth every five (5) minutes as needed for Chest Pain. Sit/Lay down then put one tab under the tongue every 5 minutes as needed for chest pain for 3 doses    OXYGEN-AIR DELIVERY SYSTEMS    3 L by IntraNASal route as needed for Other (sob). PREDNISONE (DELTASONE) 20 MG TABLET    Take 60 mg by mouth daily for 5 days. With Breakfast    ROFLUMILAST (DALIRESP) 500 MCG TAB TABLET    Take 1 Tab by mouth daily.    These Medications have changed    No medications on file   Stop Taking    No medications on file

## 2021-03-18 NOTE — ED NOTES
TRANSFER - OUT REPORT:    Verbal report given to Deborah RN(name) on Daphne Collins  being transferred to CVT stepdown(unit) for routine progression of care       Report consisted of patients Situation, Background, Assessment and   Recommendations(SBAR). Information from the following report(s) SBAR, Kardex, ED Summary, Intake/Output, MAR, Accordion and Recent Results was reviewed with the receiving nurse. Lines:   Peripheral IV 03/18/21 Left Hand (Active)   Site Assessment Clean, dry, & intact 03/18/21 0007   Phlebitis Assessment 0 03/18/21 0007   Infiltration Assessment 0 03/18/21 0007   Dressing Status Clean, dry, & intact 03/18/21 0007        Opportunity for questions and clarification was provided.       Patient transported with:   Registered Nurse

## 2021-03-18 NOTE — PROGRESS NOTES
Reason for Readmission:    Acute on chronic respiratory failure with hypoxia and hypercapnia (HCC) [J96.21, J96.22]  Elevated troponin [R77.8]  Cocaine abuse (HCC) [F14.10]         RUR Score and Risk Level:      94%     Level of Readmission:    3   (1-3)   (1 - First Readmission, 2- Second Readmission within 30 days or multiple admissions over previous 90 days, 3- Greater than two readmissions within 30 days or multiple admissions over previous 90 days)      Care Conference scheduled:   (consider for all patient's with readmission level of 2, should definitely be scheduled for all patients with readmission level of 3)       Resources/supports as identified by patient/family:         Top Challenges facing patient (as identified by patient/family and CM): Finances/Medication cost?     Has [employer]  Support system or lack thereof? Living arrangements? Lives alone, has personal care   Self-care/ADLs/Cognition? Needs assist        Current Advanced Directive/Advance Care Plan:        Healthcare Decision Maker:   Primary Decision Maker: Carmelina Lopez - Wilfredo - [phone]    Secondary Decision Maker: Queen Tasha  Sister - [phone]    Click here to complete [employer] including selection of the Healthcare Decision Maker Relationship (ie \"Primary\")           Plan for utilizing home health:   no but patient has used [facility] in the past             Likelihood of additional readmission:                Transition of Care Plan:    Based on readmission, the patient's previous Plan of Care   has been evaluated and/or modified. The current Transition of Care Plan is:            Initial assessment completed with past medical records. Cognitive status of patient: not assessed. Face sheet information confirmed:  yes. The patient designates daughter, [facility] to participate in her discharge plan and to receive any needed information.  This patient lives in a single family home alone. Patient is able to navigate steps as needed. Prior to hospitalization, patient was considered to be independent with ADLs/IADLS : yes . If not independent,  patient needs assist with : dressing, bathing, food preparation and cooking    Patient has a current ACP document on file: yes  The patient may need transport home upon discharge. The patient has oxygen supplied by [employer] available in the home. Patient is not currently active with home health. Patient has not stayed in a skilled nursing facility or rehab. This patient is on dialysis :no    Currently, the discharge plan is Home. The patient states that she can obtain her medications from the pharmacy, and take her medications as directed. Patient's current insurance is [employer].                Jill Arrieta RN - Outcomes Manager  [phone]

## 2021-03-18 NOTE — ED TRIAGE NOTES
Pt arrived via EMS c/o of SOB.  Per medicas pt had audible rales, pt placed on CPAP on the way to hospital.

## 2021-03-19 LAB
ANION GAP SERPL CALC-SCNC: 2 MMOL/L (ref 3–18)
BUN SERPL-MCNC: 23 MG/DL (ref 7–18)
BUN/CREAT SERPL: 26 (ref 12–20)
CALCIUM SERPL-MCNC: 8.2 MG/DL (ref 8.5–10.1)
CHLORIDE SERPL-SCNC: 106 MMOL/L (ref 100–111)
CO2 SERPL-SCNC: 35 MMOL/L (ref 21–32)
CREAT SERPL-MCNC: 0.88 MG/DL (ref 0.6–1.3)
ERYTHROCYTE [DISTWIDTH] IN BLOOD BY AUTOMATED COUNT: 14.1 % (ref 11.6–14.5)
GLUCOSE SERPL-MCNC: 102 MG/DL (ref 74–99)
HCT VFR BLD AUTO: 39.9 % (ref 35–45)
HGB BLD-MCNC: 12.4 G/DL (ref 12–16)
MCH RBC QN AUTO: 28.8 PG (ref 24–34)
MCHC RBC AUTO-ENTMCNC: 31.1 G/DL (ref 31–37)
MCV RBC AUTO: 92.6 FL (ref 74–97)
PLATELET # BLD AUTO: 285 K/UL (ref 135–420)
PMV BLD AUTO: 11.2 FL (ref 9.2–11.8)
POTASSIUM SERPL-SCNC: 4.2 MMOL/L (ref 3.5–5.5)
RBC # BLD AUTO: 4.31 M/UL (ref 4.2–5.3)
SODIUM SERPL-SCNC: 143 MMOL/L (ref 136–145)
WBC # BLD AUTO: 12.4 K/UL (ref 4.6–13.2)

## 2021-03-19 PROCEDURE — 65270000029 HC RM PRIVATE

## 2021-03-19 PROCEDURE — 94640 AIRWAY INHALATION TREATMENT: CPT

## 2021-03-19 PROCEDURE — 85027 COMPLETE CBC AUTOMATED: CPT

## 2021-03-19 PROCEDURE — 94660 CPAP INITIATION&MGMT: CPT

## 2021-03-19 PROCEDURE — 94761 N-INVAS EAR/PLS OXIMETRY MLT: CPT

## 2021-03-19 PROCEDURE — 80048 BASIC METABOLIC PNL TOTAL CA: CPT

## 2021-03-19 PROCEDURE — 74011250636 HC RX REV CODE- 250/636: Performed by: FAMILY MEDICINE

## 2021-03-19 PROCEDURE — 36415 COLL VENOUS BLD VENIPUNCTURE: CPT

## 2021-03-19 PROCEDURE — 74011250637 HC RX REV CODE- 250/637: Performed by: FAMILY MEDICINE

## 2021-03-19 PROCEDURE — 74011250637 HC RX REV CODE- 250/637: Performed by: INTERNAL MEDICINE

## 2021-03-19 PROCEDURE — 74011000250 HC RX REV CODE- 250: Performed by: INTERNAL MEDICINE

## 2021-03-19 PROCEDURE — 94762 N-INVAS EAR/PLS OXIMTRY CONT: CPT

## 2021-03-19 PROCEDURE — 99232 SBSQ HOSP IP/OBS MODERATE 35: CPT | Performed by: INTERNAL MEDICINE

## 2021-03-19 PROCEDURE — 77010033678 HC OXYGEN DAILY

## 2021-03-19 PROCEDURE — 74011000250 HC RX REV CODE- 250: Performed by: FAMILY MEDICINE

## 2021-03-19 RX ORDER — LOSARTAN POTASSIUM 50 MG/1
100 TABLET ORAL DAILY
Status: DISCONTINUED | OUTPATIENT
Start: 2021-03-19 | End: 2021-03-20 | Stop reason: HOSPADM

## 2021-03-19 RX ADMIN — ALBUTEROL SULFATE 2.5 MG: 2.5 SOLUTION RESPIRATORY (INHALATION) at 08:00

## 2021-03-19 RX ADMIN — VALACYCLOVIR HYDROCHLORIDE 500 MG: 500 TABLET, FILM COATED ORAL at 08:23

## 2021-03-19 RX ADMIN — VALACYCLOVIR HYDROCHLORIDE 500 MG: 500 TABLET, FILM COATED ORAL at 21:34

## 2021-03-19 RX ADMIN — METHYLPREDNISOLONE SODIUM SUCCINATE 40 MG: 40 INJECTION, POWDER, FOR SOLUTION INTRAMUSCULAR; INTRAVENOUS at 18:11

## 2021-03-19 RX ADMIN — MONTELUKAST SODIUM 10 MG: 10 TABLET, COATED ORAL at 21:34

## 2021-03-19 RX ADMIN — METHYLPREDNISOLONE SODIUM SUCCINATE 40 MG: 40 INJECTION, POWDER, FOR SOLUTION INTRAMUSCULAR; INTRAVENOUS at 06:11

## 2021-03-19 RX ADMIN — METHYLPREDNISOLONE SODIUM SUCCINATE 40 MG: 40 INJECTION, POWDER, FOR SOLUTION INTRAMUSCULAR; INTRAVENOUS at 00:03

## 2021-03-19 RX ADMIN — METHYLPREDNISOLONE SODIUM SUCCINATE 40 MG: 40 INJECTION, POWDER, FOR SOLUTION INTRAMUSCULAR; INTRAVENOUS at 13:42

## 2021-03-19 RX ADMIN — ROFLUMILAST 500 MCG: 500 TABLET ORAL at 08:23

## 2021-03-19 RX ADMIN — ATORVASTATIN CALCIUM 20 MG: 20 TABLET, FILM COATED ORAL at 21:34

## 2021-03-19 RX ADMIN — Medication 10 ML: at 06:11

## 2021-03-19 RX ADMIN — VALACYCLOVIR HYDROCHLORIDE 500 MG: 500 TABLET, FILM COATED ORAL at 01:07

## 2021-03-19 RX ADMIN — ARFORMOTEROL TARTRATE: 15 SOLUTION RESPIRATORY (INHALATION) at 07:59

## 2021-03-19 RX ADMIN — LOSARTAN POTASSIUM 100 MG: 50 TABLET, FILM COATED ORAL at 08:28

## 2021-03-19 RX ADMIN — CITALOPRAM HYDROBROMIDE 20 MG: 20 TABLET ORAL at 08:23

## 2021-03-19 RX ADMIN — Medication 10 ML: at 14:00

## 2021-03-19 RX ADMIN — ALBUTEROL SULFATE 2.5 MG: 2.5 SOLUTION RESPIRATORY (INHALATION) at 20:51

## 2021-03-19 RX ADMIN — Medication 81 MG: at 08:23

## 2021-03-19 RX ADMIN — ARFORMOTEROL TARTRATE: 15 SOLUTION RESPIRATORY (INHALATION) at 20:51

## 2021-03-19 RX ADMIN — Medication 10 ML: at 21:36

## 2021-03-19 NOTE — PROGRESS NOTES
Physician Progress Note      PATIENTBodaniel Mancia  [facility]:                  [id number]  :                       1965  ADMIT DATE:       3/18/2021 12:06 AM  DISCH DATE:  RESPONDING  PROVIDER #:        SYED SOTO DO          QUERY TEXT:    Pt admitted with COPD Exacerbation. Pt noted to have history of CHF with respiratory failure and elevated BNP. If possible, please document in progress notes and discharge summary if you are evaluating and/or treating any of the following: The medical record reflects the following:  Risk Factors: 63 y.o. female with history of HTN, CHF, CKD, COPD  Clinical Indicators: BNP 4548  Recent ECHO 3/10 - Estimated LVEF is 60 - 65%. Visually measured ejection fraction. Normal cavity size and systolic function (ejection fraction normal). Moderately increased wall thickness. Wall motion: normal.  Acute on chronic respiratory failure  Treatment: IV Lasix    Thank you,  Meka Mina RN, F  [phone]  Options provided:  -- Acute on Chronic Systolic CHF/HFrEF  -- Acute on Chronic Diastolic CHF/HFpEF  -- Chronic Systolic CHF/HFrEF  -- Chronic Diastolic CHF/HFpEF  -- Other - I will add my own diagnosis  -- Disagree - Not applicable / Not valid  -- Disagree - Clinically unable to determine / Unknown  -- Refer to Clinical Documentation Reviewer    PROVIDER RESPONSE TEXT:    This patient has chronic systolic CHF/HFrEF.     Query created by: Karen Garcia on 3/18/2021 11:21 AM      Electronically signed by:  Brandon Booth DO 3/19/2021 9:54 AM

## 2021-03-19 NOTE — PROGRESS NOTES
2030 patient arrived to the unit, was placed on the cardiac telemetry monitor. Is in stable condition on a nasal cannula at 4 lpm.  2050 Pt feeling short of breath, Bipap did not come up from the emergency department, she was wearing it until she was transported by RN on a nasal cannula. Called RT Ricco Boyd to bring bipap over to the unit. Pt is on the cardiac telemetry monitor, she had high blood pressure in the emergency department, RN stated \"It was related to her breathing and that it was not being treated at this time. 2230 pt c/o being hungry, Bipap was removed and she was given a healthy choice meal with crackers and a ginger-jazmyne she is not a blood glucose. Nasal cannula on for eating at 4 lpm  2345 Bipap placed back on her, she understands that at least 30 min to an hour is best before putting bipap back on after eating. 0020 patient is in bed sleeping on cardiac monitor  0200 accidentally pulled her IV out, informed her that I needed to put another one in. Pt covered her head with the covers, laying on her side, I asked her to roll over on her back so that I could get an IV and she ignored me.   0300 5 ps assessed and vitals and weight, pt was assisted to bedside commode and back into bed, c/o being cold, covered with a warm blanket  0515 told pt I needed an iv to give her the solumedrol ordered to help her to breathe better her eyes were open when I entered when I told her I needed an iv she closed her eyes and ignored me. I informed her that I was going to call the doctor and let them know that she is refusing an iv. Pt opened her eyes and yelled at me \"Don't you play with me, I ain't refusing nothing now get over here and get your IV\".   IV placed solumedrol given pt covered with a new warm blanket lights out and door closed per her request.      0600 called and spoke with Dr. Whitney Johnson regarding patients bp and no PRN meds, he is increasing one of her current medications this morning as opposed to adding another one. Pt takes clonidine at home. Hasn't taken it in a couple of days. 0720  bedside turnover given to RN Geovanna Abler and [employer]. Pt is in bed on the cardiac telemetry monitor, awoke when we entered, she is awaiting breakfast. Still on the bipap, included in turn over that Currently Dr. Preet Wise is changing up one of her meds to work on her BP. SBAR< MAR< ED summary and a chance to ask questions was given. Bed is in the lowest position with the wheels locked and call bell within reach on the bed next to her with her personal effects.

## 2021-03-19 NOTE — PROGRESS NOTES
Admit Date: 3/18/2021  Date of Service: 3/19/2021    Reason for follow-up: SOB      Assessment:         1. COPD with acute exacerbation:  Now off bipap  2. Acute respiratory failure secondary to above  3. Hypertension  4. Polysubstance abuse history, UDS positive for cocaine this admission  5. Medical noncompliance:  Last left AMA on 3/12  6. Tobacco use    Plan:   Ok to transfer to medical floor. Continue with IV solumedrol  Continue duonebs, inhaled steroids, daliresp, singulair  Continue ASA, lipitor, Celexa, cozaar  Magic mouthwash for apthous ulcers in mouth  Valtrex for herpes labialis  Advised patient that she may not take home medications from her purse. Full Code  Current Antibtiocs:   none    Lines:   Peripheral    Case discussed with:  []Patient  []Family  []Nursing  []Case Management  DVT Prophylaxis:  []Lovenox  []Hep SQ  []SCDs  []Coumadin   []On Heparin gtt    I have independently examined the patient and reviewed all lab studies and imgaing as well as review of nursing notes and physican notes from the past 24 hours. Gurinder Jackson D.O. Pager [phone]      No Known Allergies      Subjective:      Pt seen and examined. Feeling a little better today. Has remained off BIPAP throughout the day. Recognizes that she feels better when she is not up and walking around. Still wheezing. Eating ok. No n/v/d. Nursing reports that patient had taken pills from a pill bottle in her purse (2 bottles labeled \"clonidine\" and \"prednisone\"). Since this occurred, she has been more calm and has been sleeping. Pill bottles placed into tamper-proof sealed bag by nursing staff.      Objective:        Visit Vitals  BP (!) 128/54 (BP Patient Position: At rest)   Pulse 77   Temp 98.6 °F (37 °C)   Resp 19   Ht 4' 11\" (1.499 m)   Wt 66.3 kg (146 lb 3.2 oz)   LMP 2009   SpO2 97%   Breastfeeding No   BMI 29.53 kg/m²     Temp (24hrs), Av.2 °F (36.8 °C), Min:97.8 °F (36.6 °C), Max:98.6 °F (37 °C)        General:   awake alert and oriented   Skin:   no rashes or skin lesions noted on limited exam, dry and warm   HEENT:  No scleral icterus or pallor; oral mucosa moist, lips moist   Lymph Nodes:   not assessed today   Lungs:   non, labored;b/l wheezes, decreased breath sounds at bases   Heart:  RRR, s1 and s2; no murmurs rubs or gallops; no edema, + pedal pulses   Abdomen:  soft, non-distended, active bowel sounds, non-tender   Genitourinary:  deferred   Extremities:   average muscle tone; no contractures, no joint effusions   Neurologic:  No gross focal motor or sensory abnormalities; CN 2-12 intact; Follows commands. Psychiatric:   calmer than usual, less agitated       Labs: Results:   Chemistry Recent Labs     03/19/21  0619 03/18/21  0007   * 105*    144   K 4.2 4.0    108   CO2 35* 32   BUN 23* 20*   CREA 0.88 0.84   CA 8.2* 8.6   AGAP 2* 4   BUCR 26* 24*   AP  --  80   TP  --  7.0   ALB  --  3.7   GLOB  --  3.3   AGRAT  --  1.1      CBC w/Diff Recent Labs     03/19/21  0619 03/18/21  0007   WBC 12.4 12.9   RBC 4.31 4.24   HGB 12.4 12.3   HCT 39.9 39.0    226   GRANS  --  71   LYMPH  --  20*   EOS  --  2        Lab Results   Component Value Date/Time    Specimen Description: KNEE RT 12/28/2009 10:00 AM    Lab Results   Component Value Date/Time    Culture result: NO GROWTH 6 DAYS 02/24/2021 10:37 AM    Culture result: MODERATE NORMAL RESPIRATORY JORGE 01/11/2021 08:40 AM    Culture result: NO GROWTH 6 DAYS 01/08/2021 03:21 AM    Culture result: NO GROWTH 6 DAYS 01/08/2021 03:05 AM    Culture result: NO GROWTH 6 DAYS 11/16/2020 05:59 AM        Results     ** No results found for the last 336 hours. **          Imaging:     Xr Chest Port    Result Date: 3/18/2021  Mild prominence of the vasculature may represent some congestion without overt pulmonary edema.

## 2021-03-19 NOTE — PROGRESS NOTES
4900 - Bedside shift change report given to Maryann Welch Dr (oncoming nurse) by Emilee Antonio RN (offgoing nurse). Report included the following information SBAR, ED Summary, MAR and Cardiac Rhythm NSR/LBBB. [address] - Made aware by charge RN that patient had took medication that was in her purse. Patient had two bottles of medications in bag. States that she took one pill from the bottle labeled Clonidine 0.2 mg. Offered to put medication in the patient specific bin. Patient refused. Patient educated not to take her personal medications. MD paged. 1220 - Both patient's medications have been bagged and placed in a sealed  by unit manager      [phone] - Patient appears drowsy and is barely responsive to voice. Patient stated that she would like to get \"cleaned up\" and have her gown changed. Offered wipes and assistance with hygiene. Patient would not hold wipe or open eyes. Patient saying incomprehensible words. 1310 - Patient was finally oriented enough to get gown changed. States that she does not remember having a conversation with me or recall me asking her questions. MD paged. 1313 - Responds to voice RR 28    1319 - MD aware. 1336 - Patient states that every time she coughs she has a bowel movement    1610 - Patient still refusing vitals. Takes off B/P cuff whenever cuff inflates. 1754 - Bedside shift change report given to [address] [place] [address] [address] (oncoming nurse) by Maryann Welch Dr (offgoing nurse). Report included the following information ED Summary, MAR, Recent Results and Cardiac Rhythm NSR/LBBB.

## 2021-03-19 NOTE — PROGRESS NOTES
Multiple times pt removed her bipap, came out of the room screaming obscenities. She pulled her IV out as well. She screamed at me while I was speaking to her daughter for approximately 15 minutes. She \" fired\" me several times as well. She almost dislodged her gtz as well, but another nurse was able to get her to allow her to removed the gtz to prevent any possible injury. I was called;     \" worthless\", \" terrible\", \"stupid\", \"deaf\", \"bitch\". At one point the Er doc attempted to calm her down and explain her NPO status. She followed him out of the room and screamed obscenities, threatening him, and calling him \" mother fucker\" and \" motherfuckin' nigger\". This was screamed several times at the charge desk, while staff attempted to calm her down and get her back to her room/ oxygen. Several patients expressed their concern and the stress it caused them as well. I calmly  de escalated her several times; however when she was not given food ( NPO) she became enraged. I explained I needed to get a change in diet order, and I DID; however she had already come out ( less then 5 minutes) off her bipap, and began to eat food off of other people trays. She ate off of 3 trays in the cart, and the cart had to be moved. She went to the waiting room several times, \" leaving\" , with a portable oxygen tank that she drug with her. I got her a wheelchair and attempted to de escalate her again. The room was cleaned and when she decided to come back into the ER she was put into 17. From there she threatened Francisco J Contreras ( the other nurse in the area). She told Francisco J Contreras she was going to hit her with the oxygen tank over the head. She confronted Stacy and threatened her. \" Skinny bitch, step to me, I will beat your ass, you aint big enough to do shit, I will stomp you\"     Stacy attempted to calm her, remained professional but needed to walk away, and never stepped towards her in anyway.      She continued to come out of the room to the door way opening to the main area, and scream for me. She continued to find me, on foot, if I didn't come to her call immediately. It made it extremely difficult to care for my other two patients, both of which complained about her behavior. The patient demanded to use my phone after her adult children would no longer answer her calls. She cursed her family out on the phone the one time they answered in the late morning. She refused her meds, then denied refusing her meds and asked for them later. She accused me of giving her meds that \" kill black people\". She then called me \" snow white\" the remainder of the day. I would prefer my own name however; I would have preferred a more modern odalis, less damsel in distress type, but she decided she would stick with snowwhite; I deduct; since no other fictional character have \" white\" in the title. She explained despite my \"darker features\", I was still considered  White; so my name would remain. I joked with her that maybe birds would come to my fingers and the 7 dwarves could come clean my house. However having \" snow white \" screamed throughout the ED made it difficult to complete my tasks. As if a light switch turned off she became nice, telling me I was beautiful, allowed me to heat her food up, replace her removed IV, and was calm when I left. I ended up having to go to fast track to call report on my two other patients at [address], and I had been attempting for a while.

## 2021-03-19 NOTE — PROGRESS NOTES
1220- informed of patient stating that she took her own meds. Med bottle \"prednisone\" and \"clonidine\" both placed in tamper proof bag. Meds not counted or verified due to meds not removed from patient room.

## 2021-03-19 NOTE — PROGRESS NOTES
conducted an initial consultation and Spiritual Assessment for Kelsie Tanner, who is a 63 y.o.,female. Patient's Primary Language is: [place]. According to the patient's EMR Restorationist Affiliation is: [place].      The reason the Patient came to the hospital is:   Patient Active Problem List    Diagnosis Date Noted    Elevated troponin 03/18/2021    Chest pain at rest 03/10/2021    Hypokalemia 02/26/2021    Bilateral lower leg cellulitis 02/26/2021    Long QT interval 01/08/2021    CHF (congestive heart failure) ([address] 12/23/2020    Acute bronchitis with chronic obstructive pulmonary disease (COPD) ([address] 11/28/2020    Asthma exacerbation 11/12/2020    COPD (chronic obstructive pulmonary disease) ([address] 11/12/2020    UTI (urinary tract infection) 10/08/2020    Atypical chest pain 10/08/2020    DEMARCUS (acute kidney injury) ([address] 10/08/2020    Acute exacerbation of chronic obstructive pulmonary disease (COPD) ([address] 09/14/2020    COPD with acute exacerbation ([address] 09/07/2020    Chronic respiratory failure with hypoxia ([address] 09/07/2020    Acute bronchitis 09/07/2020    Suspected COVID-19 virus infection 09/07/2020    Tachycardia 09/07/2020    Normocytic anemia 09/07/2020    Acute respiratory distress 09/07/2020    COPD exacerbation ([address] 09/07/2020    Depression 03/22/2020    S/P hernia repair 10/31/2019    CKD (chronic kidney disease) stage 3, GFR 30-59 ml/min 10/31/2019    Tobacco use 09/28/2019    T wave inversion in EKG 03/09/2019    HLD (hyperlipidemia) 09/15/2018    Polysubstance abuse ([address] 09/15/2018    Hypertensive heart failure ([address] 12/19/2017    Sleep apnea 12/19/2017    COPD (chronic obstructive pulmonary disease) with chronic bronchitis ([address] 12/19/2017    Cocaine abuse ([address] 11/26/2017    Essential hypertension 03/10/2017    Morbid obesity due to excess calories ([address] 03/10/2017    Acute exacerbation of COPD with asthma ([facility] 03/02/2017    Respiratory failure ([facility] 01/11/2017    Fibromyalgia 20/56/4087    Uncomplicated severe persistent asthma 12/13/2016    Vocal cord disease 12/07/2016    Drug-seeking behavior 11/19/2016    Gastroesophageal reflux disease without esophagitis 10/10/2016    Thyroid goiter 06/30/2016    Asthma 03/05/2016    On home O2 12/03/2015    Abnormal CT of the chest 11/20/2015    MDRO (multiple drug resistant organisms) resistance 11/08/2012    Fe deficiency anemia 10/27/2012        The  provided the following Interventions:  Initiated a relationship of care and support. Explored issues of marilee, belief. Listened empathetically. Patient shared that she \"lives in fear\" and is afraid to be too far away from the hospital.  Provided chaplaincy education. Offered assurance of continued prayers on patient's behalf. Chart reviewed. The following outcomes where achieved:  Patient shared limited information about both their medical narrative and spiritual beliefs. Patient processed feeling about current hospitalization. Patient expressed gratitude for 's visit. Assessment:  Patient does not have any known Mosque/cultural needs that will affect patient's preferences in health care. There are no known spiritual or Mosque issues which require intervention at this time. Plan:  Chaplains will continue to follow and will provide pastoral care as needed and as requested.  recommends bedside caregivers page the  on duty if patient shows signs of spiritual or emotional distress. Fuad Schwartz, Nikita Muro.  [address]   [phone]

## 2021-03-20 VITALS
HEIGHT: 59 IN | OXYGEN SATURATION: 100 % | DIASTOLIC BLOOD PRESSURE: 89 MMHG | TEMPERATURE: 97.4 F | WEIGHT: 146.2 LBS | RESPIRATION RATE: 20 BRPM | HEART RATE: 81 BPM | SYSTOLIC BLOOD PRESSURE: 175 MMHG | BODY MASS INDEX: 29.48 KG/M2

## 2021-03-20 LAB
ANION GAP SERPL CALC-SCNC: 3 MMOL/L (ref 3–18)
BUN SERPL-MCNC: 21 MG/DL (ref 7–18)
BUN/CREAT SERPL: 24 (ref 12–20)
CALCIUM SERPL-MCNC: 8.6 MG/DL (ref 8.5–10.1)
CHLORIDE SERPL-SCNC: 107 MMOL/L (ref 100–111)
CO2 SERPL-SCNC: 32 MMOL/L (ref 21–32)
CREAT SERPL-MCNC: 0.88 MG/DL (ref 0.6–1.3)
ERYTHROCYTE [DISTWIDTH] IN BLOOD BY AUTOMATED COUNT: 14.4 % (ref 11.6–14.5)
GLUCOSE SERPL-MCNC: 124 MG/DL (ref 74–99)
HCT VFR BLD AUTO: 38.8 % (ref 35–45)
HGB BLD-MCNC: 11.9 G/DL (ref 12–16)
MCH RBC QN AUTO: 28.3 PG (ref 24–34)
MCHC RBC AUTO-ENTMCNC: 30.7 G/DL (ref 31–37)
MCV RBC AUTO: 92.2 FL (ref 74–97)
PLATELET # BLD AUTO: 278 K/UL (ref 135–420)
PMV BLD AUTO: 10.8 FL (ref 9.2–11.8)
POTASSIUM SERPL-SCNC: 3.8 MMOL/L (ref 3.5–5.5)
RBC # BLD AUTO: 4.21 M/UL (ref 4.2–5.3)
SODIUM SERPL-SCNC: 142 MMOL/L (ref 136–145)
WBC # BLD AUTO: 12.9 K/UL (ref 4.6–13.2)

## 2021-03-20 PROCEDURE — 74011250636 HC RX REV CODE- 250/636: Performed by: FAMILY MEDICINE

## 2021-03-20 PROCEDURE — 94640 AIRWAY INHALATION TREATMENT: CPT

## 2021-03-20 PROCEDURE — 74011000250 HC RX REV CODE- 250: Performed by: INTERNAL MEDICINE

## 2021-03-20 PROCEDURE — 80048 BASIC METABOLIC PNL TOTAL CA: CPT

## 2021-03-20 PROCEDURE — 2709999900 HC NON-CHARGEABLE SUPPLY

## 2021-03-20 PROCEDURE — 74011250637 HC RX REV CODE- 250/637: Performed by: INTERNAL MEDICINE

## 2021-03-20 PROCEDURE — 85027 COMPLETE CBC AUTOMATED: CPT

## 2021-03-20 PROCEDURE — 94660 CPAP INITIATION&MGMT: CPT

## 2021-03-20 PROCEDURE — 74011250637 HC RX REV CODE- 250/637: Performed by: FAMILY MEDICINE

## 2021-03-20 PROCEDURE — 36415 COLL VENOUS BLD VENIPUNCTURE: CPT

## 2021-03-20 PROCEDURE — 94761 N-INVAS EAR/PLS OXIMETRY MLT: CPT

## 2021-03-20 RX ORDER — ALBUTEROL SULFATE 0.83 MG/ML
2.5 SOLUTION RESPIRATORY (INHALATION)
Status: DISCONTINUED | OUTPATIENT
Start: 2021-03-20 | End: 2021-03-20 | Stop reason: HOSPADM

## 2021-03-20 RX ORDER — ALBUTEROL SULFATE 0.83 MG/ML
SOLUTION RESPIRATORY (INHALATION)
Status: DISCONTINUED
Start: 2021-03-20 | End: 2021-03-20 | Stop reason: HOSPADM

## 2021-03-20 RX ORDER — HALOPERIDOL 5 MG/ML
2 INJECTION INTRAMUSCULAR
Status: DISCONTINUED | OUTPATIENT
Start: 2021-03-20 | End: 2021-03-20

## 2021-03-20 RX ADMIN — VALACYCLOVIR HYDROCHLORIDE 500 MG: 500 TABLET, FILM COATED ORAL at 08:24

## 2021-03-20 RX ADMIN — METHYLPREDNISOLONE SODIUM SUCCINATE 40 MG: 40 INJECTION, POWDER, FOR SOLUTION INTRAMUSCULAR; INTRAVENOUS at 00:10

## 2021-03-20 RX ADMIN — LOSARTAN POTASSIUM 100 MG: 50 TABLET, FILM COATED ORAL at 08:25

## 2021-03-20 RX ADMIN — ARFORMOTEROL TARTRATE: 15 SOLUTION RESPIRATORY (INHALATION) at 08:03

## 2021-03-20 RX ADMIN — ALBUTEROL SULFATE 2.5 MG: 2.5 SOLUTION RESPIRATORY (INHALATION) at 03:54

## 2021-03-20 RX ADMIN — ROFLUMILAST 500 MCG: 500 TABLET ORAL at 08:23

## 2021-03-20 RX ADMIN — Medication 81 MG: at 08:25

## 2021-03-20 NOTE — PROGRESS NOTES
Problem: Falls - Risk of  Goal: *Absence of Falls  Description: Document Harshal Led Fall Risk and appropriate interventions in the flowsheet.   Outcome: Progressing Towards Goal  Note: Fall Risk Interventions:  Mobility Interventions: Patient to call before getting OOB, Utilize walker, cane, or other assistive device    Mentation Interventions: Door open when patient unattended    Medication Interventions: Patient to call before getting OOB, Teach patient to arise slowly    Elimination Interventions: Call light in reach              Problem: Patient Education: Go to Patient Education Activity  Goal: Patient/Family Education  Outcome: Progressing Towards Goal     Problem: General Medical Care Plan  Goal: *Vital signs within specified parameters  Outcome: Progressing Towards Goal  Goal: *Labs within defined limits  Outcome: Progressing Towards Goal  Goal: *Absence of infection signs and symptoms  Outcome: Progressing Towards Goal  Goal: *Optimal pain control at patient's stated goal  Outcome: Progressing Towards Goal  Goal: *Skin integrity maintained  Outcome: Progressing Towards Goal  Goal: *Fluid volume balance  Outcome: Progressing Towards Goal  Goal: *Optimize nutritional status  Outcome: Progressing Towards Goal  Goal: *Anxiety reduced or absent  Outcome: Progressing Towards Goal  Goal: *Progressive mobility and function (eg: ADL's)  Outcome: Progressing Towards Goal     Problem: Patient Education: Go to Patient Education Activity  Goal: Patient/Family Education  Outcome: Progressing Towards Goal

## 2021-03-20 NOTE — ROUTINE PROCESS
Report given to Wilfredo Russ RN, including SBAR, [facility], and Kardex. Patient alert and oriented, vitals within normal limits, no apparent distress.

## 2021-03-20 NOTE — DISCHARGE SUMMARY
[facility]  [facility] Summary       Patient: Yolande Colmenares Age: 63 y.o. : 1965 MR#: [id number] SSN: [id number]  PCP on record: Adam Zelaya MD  Admit date: 3/18/2021  Discharge date: 3/20/2021    Consults:   - none    Procedures:  -   None  Significant Diagnostic Studies:   -  Xr Chest Port    Result Date: 3/18/2021  Mild prominence of the vasculature may represent some congestion without overt pulmonary edema.     Lab Results   Component Value Date/Time    Specimen Description: KNEE RT 2009 10:00 AM    Lab Results   Component Value Date/Time    Culture result: NO GROWTH 6 DAYS 2021 10:37 AM    Culture result: MODERATE NORMAL RESPIRATORY JORGE 2021 08:40 AM    Culture result: NO GROWTH 6 DAYS 2021 03:21 AM    Culture result: NO GROWTH 6 DAYS 2021 03:05 AM    Culture result: NO GROWTH 6 DAYS 2020 05:59 AM            CBC W/O DIFF    Collection Time: 21  5:29 AM   Result Value Ref Range    WBC 12.9 4.6 - 13.2 K/uL    RBC 4.21 4.20 - 5.30 M/uL    HGB 11.9 (L) 12.0 - 16.0 g/dL    HCT 38.8 35.0 - 45.0 %    MCV 92.2 74.0 - 97.0 FL    MCH 28.3 24.0 - 34.0 PG    MCHC 30.7 (L) 31.0 - 37.0 g/dL    RDW 14.4 11.6 - 14.5 %    PLATELET 876 500 - 357 K/uL    MPV 10.8 9.2 - 11.8 FL     BMP:   Lab Results   Component Value Date/Time     2021 05:29 AM    K 3.8 2021 05:29 AM     2021 05:29 AM    CO2 32 2021 05:29 AM    AGAP 3 2021 05:29 AM     (H) 2021 05:29 AM    BUN 21 (H) 2021 05:29 AM    CREA 0.88 2021 05:29 AM    GFRAA >60 2021 05:29 AM    GFRNA >60 2021 05:29 AM        Diagnoses at time of elopement                                           Patient Active Problem List   Diagnosis Code    Abnormal CT of the chest R93.89    Asthma J45.909    Respiratory failure ([facility]) J96.90    Acute exacerbation of COPD with asthma ([facility] J44.1, J45.901    Essential hypertension I10    Morbid obesity due to excess calories ([facility]) E66.01    Hypertensive heart failure ([facility]) I11.0    Sleep apnea G47.30    COPD (chronic obstructive pulmonary disease) with chronic bronchitis ([facility]) J44.9    T wave inversion in EKG R94.31    Tobacco use Z72.0    S/P hernia repair Z98.890, Z87.19    CKD (chronic kidney disease) stage 3, GFR 30-59 ml/min N18.30    Cocaine abuse ([facility]) F14.10    Drug-seeking behavior Z76.5    Fibromyalgia M79.7    Depression F32.9    Fe deficiency anemia D50.9    Gastroesophageal reflux disease without esophagitis K21.9    HLD (hyperlipidemia) E78.5    Thyroid goiter E04.9    MDRO (multiple drug resistant organisms) resistance [id number]    On home O2 Z99.81    Polysubstance abuse ([facility]) O63.15    Uncomplicated severe persistent asthma J45.50    Vocal cord disease J38.3    COPD with acute exacerbation ([facility]) J44.1    Chronic respiratory failure with hypoxia ([facility]) J96.11    Acute bronchitis J20.9    Suspected COVID-19 virus infection Z20.822    Tachycardia R00.0    Normocytic anemia D64.9    Acute respiratory distress R06.03    COPD exacerbation ([facility]) J44.1    Acute exacerbation of chronic obstructive pulmonary disease (COPD) ([facility]) J44.1    UTI (urinary tract infection) N39.0    Atypical chest pain R07.89    DEMARCUS (acute kidney injury) ([facility]) N17.9    Asthma exacerbation J45. 0    COPD (chronic obstructive pulmonary disease) ([facility]) J44.9    Acute bronchitis with chronic obstructive pulmonary disease (COPD) ([facility]) J44.0, J20.9    CHF (congestive heart failure) ([facility]) I50.9    Long QT interval R94.31    Hypokalemia E87.6    Bilateral lower leg cellulitis L03.116, L03.115    Chest pain at rest R07.9    Elevated troponin R77.8       Hospital Course by Problem   1. COPD with acute exacerbation:  Now off bipap and was weaning on O2 NC  2. Acute respiratory failure secondary to above  3. Hypertension  4.  Polysubstance abuse history, UDS positive for cocaine this admission  5. Medical noncompliance:  Last left AMA on 3/12  6. Tobacco use: previously advised to stop smoking,      Disposition:     Patient has left hospital against medical advise.      Signed:  Manish Rodriguez MD  3/20/2021  10:19 AM

## 2021-03-20 NOTE — PROGRESS NOTES
Patient began pulling at her sheets and removing her gown stating \"I'm freezing and I'm wet. \" Patient was extremely diaphoretic and had an episode of incontinence. While changing linens, CNA reported that the patient was becoming agitated and standing on the bed, yelling, and refusing to cooperate. Assessment completed, and patient appeared fidgety and anxious. Notified Dr. James Son to request PRN medications for withdrawal symptoms. Order placed, and another order was received for a sitter. After a bath was given, linens changed, and a breathing treatment given for shortness of breath, the patient appeared more calm and cooperative and was resting quietly watching TV. Will monitor.

## 2021-03-20 NOTE — ROUTINE PROCESS
ADULT PROTOCOL: JET AEROSOL ASSESSMENT Patient  Daphne Collins     63 y.o.   female     3/20/2021  2:41 AM 
 
Breath Sounds Pre Procedure:  Breath Sounds Bilateral: Diminished Breath Sounds Post Procedure: Breath Sounds Bilateral: Diminished Breathing pattern: Pre procedure  Breathing Pattern: Regular Post procedure  Breathing Pattern: Regular Cough: Pre procedure Post procedure Heart Rate: Pre procedure Pulse: 79 Post procedure Pulse: 84 Resp Rate: Pre procedure  Respirations: 22 Post procedure Nebulizer Therapy: Current medications Aerosolized Medications: Albuterol Problem List:  
Patient Active Problem List  
Diagnosis Code  Abnormal CT of the chest R93.89  
 Asthma J45.909  Respiratory failure (HCC) J96.90  
 Acute exacerbation of COPD with asthma ([facility] J44.1, J45.901  Essential hypertension I10  Morbid obesity due to excess calories ([facility]) E66.01  
 Hypertensive heart failure ([facility] I11.0  Sleep apnea G47.30  
 COPD (chronic obstructive pulmonary disease) with chronic bronchitis ([facility]) J44.9  T wave inversion in EKG R94.31  
 Tobacco use Z72.0  
 S/P hernia repair Z98.890, Z87.19  
 CKD (chronic kidney disease) stage 3, GFR 30-59 ml/min N18.30  Cocaine abuse ([facility] F14.10  Drug-seeking behavior Z76.5  Fibromyalgia M79.7  Depression F32.9  Fe deficiency anemia D50.9  Gastroesophageal reflux disease without esophagitis K21.9  
 HLD (hyperlipidemia) E78.5  Thyroid goiter E04.9  MDRO (multiple drug resistant organisms) resistance [id number]  On home O2 Z99.81  
 Polysubstance abuse ([facility] F19.10  Uncomplicated severe persistent asthma J45.50  Vocal cord disease J38.3  COPD with acute exacerbation ([facility] J44.1  Chronic respiratory failure with hypoxia ([facility]) J96.11  
 Acute bronchitis J20.9  Suspected COVID-19 virus infection Z20.822  Tachycardia R00.0  Normocytic anemia D64.9  Acute respiratory distress R06.03  
 COPD exacerbation ([facility]) J44.1  Acute exacerbation of chronic obstructive pulmonary disease (COPD) ([facility] J44.1  UTI (urinary tract infection) N39.0  Atypical chest pain R07.89  
 DEMARCUS (acute kidney injury) ([facility] N17.9  Asthma exacerbation J45. [address]  COPD (chronic obstructive pulmonary disease) ([facility]) J44.9  Acute bronchitis with chronic obstructive pulmonary disease (COPD) ([facility]) J44.0, J20.9  CHF (congestive heart failure) ([facility]) I50.9  Long QT interval R94.31  
 Hypokalemia E87.6  Bilateral lower leg cellulitis L03.116, L03.115  Chest pain at rest R07.9  Elevated troponin R77.8 Patient alert and cooperative to use MDI: Yes 
 
Home Respiratory Therapy Regimen/Frequency:  YES Medication Albuterol Device HHN Frequency Q4 Prn SEVERITY INDEX: 
 
ITEM 0 1 2 3 4 Score Respiratory Pattern and or Rate Regular 10-19 Regular 20-24  
24-30   
30-34 Severe SOB or  
Greater than 35 1 Breath Sounds Clear Occasional Wheeze Mild Wheezing Moderate Wheezing  wheezing/Absent breath sounds 2 Shortness of Breath None Dyspnea on Exertion Dyspnea at Rest Moderate Shortness of Breath at Rest Severe Shortness of Breath - Limited Speech 1 Total Score:  4 * Scoring Guidelines 0-4 pts:  PRN-BID  
5-7 pts:  BID, TID, QID 
8-9 pts:  TID, QID, Q6 
10-12 pts:  Q4-Q6 * - Guidelines used with clinical judgement. PRN Treatments can be ordered to supplement scheduled treatments. Regardless of score, frequency should not be less than normal home regimen. Recommended Order/Frequency:  Change to Q6 Comments:   
 
 
 
Respiratory Therapist: Laurel Cabral RT

## 2021-03-20 NOTE — PROGRESS NOTES
Patient pulled out IV overnight, attempted to restart with no success. Patient refused to let me attempt a second time and also refused to let me insert an IV in the one good vein she has in her A/C. Will attempt again when patient allows.

## 2021-03-20 NOTE — PROGRESS NOTES
Problem: Falls - Risk of  Goal: *Absence of Falls  Description: Document Sophia Morin Fall Risk and appropriate interventions in the flowsheet. Outcome: Progressing Towards Goal  Note: Fall Risk Interventions:  Mobility Interventions: Patient to call before getting OOB, Utilize walker, cane, or other assistive device    Mentation Interventions: Door open when patient unattended         Elimination Interventions:  Toileting schedule/hourly rounds              Problem: Patient Education: Go to Patient Education Activity  Goal: Patient/Family Education  Outcome: Progressing Towards Goal     Problem: General Medical Care Plan  Goal: *Vital signs within specified parameters  Outcome: Progressing Towards Goal  Goal: *Labs within defined limits  Outcome: Progressing Towards Goal  Goal: *Absence of infection signs and symptoms  Outcome: Progressing Towards Goal  Goal: *Optimal pain control at patient's stated goal  Outcome: Progressing Towards Goal  Goal: *Skin integrity maintained  Outcome: Progressing Towards Goal  Goal: *Fluid volume balance  Outcome: Progressing Towards Goal  Goal: *Optimize nutritional status  Outcome: Progressing Towards Goal  Goal: *Anxiety reduced or absent  Outcome: Progressing Towards Goal  Goal: *Progressive mobility and function (eg: ADL's)  Outcome: Progressing Towards Goal     Problem: Patient Education: Go to Patient Education Activity  Goal: Patient/Family Education  Outcome: Progressing Towards Goal

## 2021-03-20 NOTE — PROGRESS NOTES
Problem: Falls - Risk of  Goal: *Absence of Falls  Description: Document Delroy Fall Risk and appropriate interventions in the flowsheet.  3/20/2021 1146 by Dianne Pennington  Outcome: Resolved/Met  3/20/2021 1145 by Dianne Pennington  Outcome: Progressing Towards Goal  Note: Fall Risk Interventions:  Mobility Interventions: Patient to call before getting OOB, Utilize walker, cane, or other assistive device    Mentation Interventions: Door open when patient unattended    Medication Interventions: Patient to call before getting OOB, Teach patient to arise slowly    Elimination Interventions: Call light in reach              Problem: Patient Education: Go to Patient Education Activity  Goal: Patient/Family Education  3/20/2021 1146 by Dianne Pennington  Outcome: Resolved/Met  3/20/2021 1145 by Dianne Pennington  Outcome: Progressing Towards Goal     Problem: General Medical Care Plan  Goal: *Vital signs within specified parameters  3/20/2021 1146 by Dianne Pennington  Outcome: Resolved/Met  3/20/2021 1145 by Dianne Pennington  Outcome: Progressing Towards Goal  Goal: *Labs within defined limits  3/20/2021 1146 by Dianne Pennington  Outcome: Resolved/Met  3/20/2021 1145 by Dianne Pennington  Outcome: Progressing Towards Goal  Goal: *Absence of infection signs and symptoms  3/20/2021 1146 by Dianne Pennington  Outcome: Resolved/Met  3/20/2021 1145 by Dianne Pennington  Outcome: Progressing Towards Goal  Goal: *Optimal pain control at patient's stated goal  3/20/2021 1146 by Dianne Pennington  Outcome: Resolved/Met  3/20/2021 1145 by Dianne Pennington  Outcome: Progressing Towards Goal  Goal: *Skin integrity maintained  3/20/2021 1146 by Dianne Pennington  Outcome: Resolved/Met  3/20/2021 1145 by Dianne Pennington  Outcome: Progressing Towards Goal  Goal: *Fluid volume balance  3/20/2021 1146 by Dianne Pennington  Outcome: Resolved/Met  3/20/2021 1145 by Dianne Pennington  Outcome: Progressing Towards Goal  Goal: *Optimize nutritional  status  3/20/2021 1146 by Franck Kumar  Outcome: Resolved/Met  3/20/2021 1145 by Franck Ao  Outcome: Progressing Towards Goal  Goal: *Anxiety reduced or absent  3/20/2021 1146 by Franck Kumar  Outcome: Resolved/Met  3/20/2021 1145 by Franck Kumar  Outcome: Progressing Towards Goal  Goal: *Progressive mobility and function (eg: ADL's)  3/20/2021 1146 by Franck Kumar  Outcome: Resolved/Met  3/20/2021 1145 by Franck Ao  Outcome: Progressing Towards Goal     Problem: Patient Education: Go to Patient Education Activity  Goal: Patient/Family Education  3/20/2021 1146 by Franck Kumar  Outcome: Resolved/Met  3/20/2021 1145 by Franck Kumar  Outcome: Progressing Towards Goal

## 2021-03-20 NOTE — PROGRESS NOTES
Patient pulled out IV line around 0330 this morning. One unsuccessful attempt was made to restart IV, however the patient refused another attempt. Will have day shift attempt again. Patient resting comfortably in bed, no apparent distress.

## 2021-03-20 NOTE — ROUTINE PROCESS
TRANSFER - IN REPORT: 
 
Verbal report received from [address], RN (name) on Gary Barth  being received from [facility] (unit) for routine progression of care Report consisted of patients Situation, Background, Assessment and  
Recommendations(SBAR). Information from the following report(s) SBAR, Kardex, Intake/Output, MAR and Recent Results was reviewed with the receiving nurse. Opportunity for questions and clarification was provided. Assessment completed upon patients arrival to unit and care assumed.

## 2021-03-20 NOTE — PROGRESS NOTES
Patient agitated, cursing at the staff. States that she needs to leave AMA because they are messing with her money. States that she can come back when she gets her money. Patient out walking in the [employer]els naked , yelling at the staff. AMA paper printed but patient refusing to sign. Patient still SOB and wheezing and has no oxygen to go home with. Discussed and educated the patient on consequences of leaving AMA. Dr Lizy Galindo notified and patient left AMA with family.

## 2021-03-24 ENCOUNTER — HOSPITAL ENCOUNTER (EMERGENCY)
Age: 56
Discharge: HOME | End: 2021-03-24
Attending: EMERGENCY MEDICINE
Payer: MEDICAID

## 2021-03-24 ENCOUNTER — APPOINTMENT (OUTPATIENT)
Dept: GENERAL RADIOLOGY | Age: 56
End: 2021-03-24
Attending: EMERGENCY MEDICINE
Payer: MEDICAID

## 2021-03-24 VITALS
DIASTOLIC BLOOD PRESSURE: 83 MMHG | TEMPERATURE: 98.4 F | HEART RATE: 95 BPM | SYSTOLIC BLOOD PRESSURE: 226 MMHG | RESPIRATION RATE: 20 BRPM | OXYGEN SATURATION: 100 %

## 2021-03-24 DIAGNOSIS — E87.6 HYPOKALEMIA: ICD-10-CM

## 2021-03-24 DIAGNOSIS — J44.1 ACUTE EXACERBATION OF CHRONIC OBSTRUCTIVE PULMONARY DISEASE (COPD) (HCC): Primary | ICD-10-CM

## 2021-03-24 DIAGNOSIS — I10 UNCONTROLLED HYPERTENSION: ICD-10-CM

## 2021-03-24 DIAGNOSIS — E83.39 HYPOPHOSPHATEMIA: ICD-10-CM

## 2021-03-24 DIAGNOSIS — Z76.5 DRUG-SEEKING BEHAVIOR: ICD-10-CM

## 2021-03-24 LAB
ALBUMIN SERPL-MCNC: 3 G/DL (ref 3.4–5)
ALBUMIN/GLOB SERPL: 1 {RATIO} (ref 0.8–1.7)
ALP SERPL-CCNC: 75 U/L (ref 45–117)
ALT SERPL-CCNC: 30 U/L (ref 13–56)
ANION GAP SERPL CALC-SCNC: 10 MMOL/L (ref 3–18)
AST SERPL-CCNC: 17 U/L (ref 10–38)
ATRIAL RATE: 80 BPM
BASOPHILS # BLD: 0 K/UL (ref 0–0.1)
BASOPHILS NFR BLD: 0 % (ref 0–2)
BILIRUB SERPL-MCNC: 0.3 MG/DL (ref 0.2–1)
BNP SERPL-MCNC: 1966 PG/ML (ref 0–900)
BUN SERPL-MCNC: 9 MG/DL (ref 7–18)
BUN/CREAT SERPL: 8 (ref 12–20)
CALCIUM SERPL-MCNC: 8.1 MG/DL (ref 8.5–10.1)
CALCULATED P AXIS, ECG09: 52 DEGREES
CALCULATED R AXIS, ECG10: -61 DEGREES
CALCULATED T AXIS, ECG11: 100 DEGREES
CHLORIDE SERPL-SCNC: 106 MMOL/L (ref 100–111)
CK MB CFR SERPL CALC: 9.3 % (ref 0–4)
CK MB SERPL-MCNC: 4.1 NG/ML (ref 5–25)
CK SERPL-CCNC: 44 U/L (ref 26–192)
CO2 SERPL-SCNC: 27 MMOL/L (ref 21–32)
CREAT SERPL-MCNC: 1.2 MG/DL (ref 0.6–1.3)
DIAGNOSIS, 93000: NORMAL
DIFFERENTIAL METHOD BLD: ABNORMAL
EOSINOPHIL # BLD: 0.2 K/UL (ref 0–0.4)
EOSINOPHIL NFR BLD: 2 % (ref 0–5)
ERYTHROCYTE [DISTWIDTH] IN BLOOD BY AUTOMATED COUNT: 14.8 % (ref 11.6–14.5)
GLOBULIN SER CALC-MCNC: 3.1 G/DL (ref 2–4)
GLUCOSE SERPL-MCNC: 305 MG/DL (ref 74–99)
HCT VFR BLD AUTO: 37.3 % (ref 35–45)
HGB BLD-MCNC: 11.4 G/DL (ref 12–16)
LYMPHOCYTES # BLD: 2.8 K/UL (ref 0.9–3.6)
LYMPHOCYTES NFR BLD: 28 % (ref 21–52)
MAGNESIUM SERPL-MCNC: 2.2 MG/DL (ref 1.6–2.6)
MCH RBC QN AUTO: 28.7 PG (ref 24–34)
MCHC RBC AUTO-ENTMCNC: 30.6 G/DL (ref 31–37)
MCV RBC AUTO: 94 FL (ref 74–97)
MONOCYTES # BLD: 0.4 K/UL (ref 0.05–1.2)
MONOCYTES NFR BLD: 4 % (ref 3–10)
NEUTS SEG # BLD: 6.5 K/UL (ref 1.8–8)
NEUTS SEG NFR BLD: 66 % (ref 40–73)
P-R INTERVAL, ECG05: 120 MS
PHOSPHATE SERPL-MCNC: 1.1 MG/DL (ref 2.5–4.9)
PLATELET # BLD AUTO: 254 K/UL (ref 135–420)
PMV BLD AUTO: 10.8 FL (ref 9.2–11.8)
POTASSIUM SERPL-SCNC: 3.2 MMOL/L (ref 3.5–5.5)
PROT SERPL-MCNC: 6.1 G/DL (ref 6.4–8.2)
Q-T INTERVAL, ECG07: 430 MS
QRS DURATION, ECG06: 136 MS
QTC CALCULATION (BEZET), ECG08: 495 MS
RBC # BLD AUTO: 3.97 M/UL (ref 4.2–5.3)
SODIUM SERPL-SCNC: 143 MMOL/L (ref 136–145)
TROPONIN I SERPL-MCNC: 0.04 NG/ML (ref 0–0.04)
VENTRICULAR RATE, ECG03: 80 BPM
WBC # BLD AUTO: 9.9 K/UL (ref 4.6–13.2)

## 2021-03-24 PROCEDURE — 83880 ASSAY OF NATRIURETIC PEPTIDE: CPT

## 2021-03-24 PROCEDURE — 83735 ASSAY OF MAGNESIUM: CPT

## 2021-03-24 PROCEDURE — 74011250637 HC RX REV CODE- 250/637: Performed by: EMERGENCY MEDICINE

## 2021-03-24 PROCEDURE — 82553 CREATINE MB FRACTION: CPT

## 2021-03-24 PROCEDURE — 71045 X-RAY EXAM CHEST 1 VIEW: CPT

## 2021-03-24 PROCEDURE — 80053 COMPREHEN METABOLIC PANEL: CPT

## 2021-03-24 PROCEDURE — 84100 ASSAY OF PHOSPHORUS: CPT

## 2021-03-24 PROCEDURE — 93005 ELECTROCARDIOGRAM TRACING: CPT

## 2021-03-24 PROCEDURE — 85025 COMPLETE CBC W/AUTO DIFF WBC: CPT

## 2021-03-24 PROCEDURE — 99285 EMERGENCY DEPT VISIT HI MDM: CPT

## 2021-03-24 RX ORDER — PREDNISONE 50 MG/1
50 TABLET ORAL DAILY
Qty: 5 TAB | Refills: 0 | Status: SHIPPED | OUTPATIENT
Start: 2021-03-24 | End: 2021-03-29

## 2021-03-24 RX ORDER — CLONIDINE HYDROCHLORIDE 0.1 MG/1
0.2 TABLET ORAL
Status: COMPLETED | OUTPATIENT
Start: 2021-03-24 | End: 2021-03-24

## 2021-03-24 RX ORDER — OXYCODONE AND ACETAMINOPHEN 5; 325 MG/1; MG/1
1 TABLET ORAL
Status: COMPLETED | OUTPATIENT
Start: 2021-03-24 | End: 2021-03-24

## 2021-03-24 RX ORDER — POTASSIUM CHLORIDE 20 MEQ/1
40 TABLET, EXTENDED RELEASE ORAL
Status: COMPLETED | OUTPATIENT
Start: 2021-03-24 | End: 2021-03-24

## 2021-03-24 RX ADMIN — CLONIDINE HYDROCHLORIDE 0.2 MG: 0.1 TABLET ORAL at 10:29

## 2021-03-24 RX ADMIN — OXYCODONE AND ACETAMINOPHEN 1 TABLET: 5; 325 TABLET ORAL at 12:27

## 2021-03-24 RX ADMIN — POTASSIUM CHLORIDE 40 MEQ: 1500 TABLET, EXTENDED RELEASE ORAL at 15:08

## 2021-03-24 NOTE — ED PROVIDER NOTES
HPI   42-year-old -American female brought in by EMS for evaluation of shortness of breath. EMS technician reports that the patient stated that her symptoms began yesterday. On arrival to the patient's home they found her severely short of breath, with diminished lung fields bilaterally and speaking in 1-2 word sentences. She was treated with albuterol 5 mg initially, Solu-Medrol 125 mg IV, and magnesium 2 g IV. She was also given a DuoNeb treatment prior to arrival.  EMS reports that her breathing improved and she was able to speak in full sentences on arrival to the emergency room. Patient reports chest tightness. She denies any tachycardia, fever, abdominal pain, vomiting, or diarrhea.   Past Medical History:   Diagnosis Date    CHF (congestive heart failure) (HCC)     Chronic respiratory failure with hypoxia ([address] 9/7/2020    CKD (chronic kidney disease) stage 3, GFR 30-59 ml/min 10/31/2019    Cocaine abuse ([address] 11/26/2017    COPD (chronic obstructive pulmonary disease) with chronic bronchitis ([address] 12/19/2017    Dependence on supplemental oxygen     Depression 3/22/2020    Drug-seeking behavior 11/19/2016    Endocrine disease     thyroid issues    Essential hypertension 3/10/2017    Fe deficiency anemia 10/27/2012    Fibromyalgia 12/16/2016    Gastroesophageal reflux disease without esophagitis 10/10/2016    Gastrointestinal disorder     \"blockage in my stomach\"    Hypercholesterolemia     Hypertension     Hypertensive heart failure ([address] 12/19/2017    Morbid obesity due to excess calories ([address] 3/10/2017    NSTEMI (non-ST elevated myocardial infarction) ([address] 3/22/2020    On home O2 12/3/2015    Overview:  2L at home per pt for approx 8 years    Polysubstance abuse ([address] 9/15/2018    Respiratory failure ([address] 1/11/2017    S/P hernia repair 10/31/2019    Sleep apnea 12/19/2017    T wave inversion in EKG 3/9/2019    Tobacco use 5/32/5378    Uncomplicated severe persistent asthma 12/13/2016    Vocal cord disease 12/7/2016       Past Surgical History:   Procedure Laterality Date    HX GI      Exploratory laparotomy with lysis of adhesions and primary repair of incarcerated umbilical hernia         No family history on file. Social History     Socioeconomic History    Marital status: SINGLE     Spouse name: Not on file    Number of children: Not on file    Years of education: Not on file    Highest education level: Not on file   Occupational History    Not on file   Social Needs    Financial resource strain: Not on file    Food insecurity     Worry: Not on file     Inability: Not on file    Transportation needs     Medical: Not on file     Non-medical: Not on file   Tobacco Use    Smoking status: Former Smoker     Packs/day: 0.25    Smokeless tobacco: Current User    Tobacco comment: Pt states she has not had money to buy cigarettes in the past month   Substance and Sexual Activity    Alcohol use: No     Comment: socially    Drug use: Not Currently     Types: Heroin     Comment: pt reports using approximately a month ago    Sexual activity: Not Currently     Comment: last used 9 years ago   Lifestyle    Physical activity     Days per week: Not on file     Minutes per session: Not on file    Stress: Not on file   Relationships    Social connections     Talks on phone: Not on file     Gets together: Not on file     Attends Jain service: Not on file     Active member of club or organization: Not on file     Attends meetings of clubs or organizations: Not on file     Relationship status: Not on file    Intimate partner violence     Fear of current or ex partner: Not on file     Emotionally abused: Not on file     Physically abused: Not on file     Forced sexual activity: Not on file   Other Topics Concern    Not on file   Social History Narrative    Not on file         ALLERGIES: Patient has no known allergies.     Review of Systems   Constitutional: Negative for chills, diaphoresis, fatigue and fever. HENT: Negative for congestion, dental problem, ear discharge, ear pain, hearing loss, nosebleeds, postnasal drip, sinus pressure and sore throat. Eyes: Negative for photophobia, pain, discharge and redness. Respiratory: Positive for chest tightness, shortness of breath and wheezing. Negative for cough and stridor. Cardiovascular: Negative for chest pain, palpitations and leg swelling. Gastrointestinal: Negative for abdominal distention, abdominal pain, anal bleeding, blood in stool, constipation, diarrhea, nausea and vomiting. Endocrine: Negative for polydipsia, polyphagia and polyuria. Genitourinary: Negative for difficulty urinating, dysuria, flank pain, frequency, hematuria, menstrual problem, pelvic pain, urgency, vaginal bleeding, vaginal discharge and vaginal pain. Musculoskeletal: Negative for arthralgias, back pain, joint swelling, myalgias, neck pain and neck stiffness. Skin: Negative for color change, rash and wound. Allergic/Immunologic: Negative for food allergies and immunocompromised state. Neurological: Negative for dizziness, tremors, seizures, syncope, weakness, light-headedness, numbness and headaches. Hematological: Negative for adenopathy. Does not bruise/bleed easily. Psychiatric/Behavioral: Negative for agitation, confusion, decreased concentration, hallucinations, sleep disturbance and suicidal ideas. The patient is not nervous/anxious. All other systems reviewed and are negative. Vitals:    03/24/21 0800 03/24/21 1027 03/24/21 1429   BP: (!) 233/99 (!) 185/86 (!) 226/83   Pulse: 82 86 95   Resp: 20 20 20   Temp: 97.7 °F (36.5 °C)  98.4 °F (36.9 °C)   SpO2: 99% 98% 100%            Physical Exam  Vitals signs and nursing note reviewed. Constitutional:       General: She is not in acute distress. Appearance: She is well-developed. She is obese. She is not diaphoretic.       Comments: 53-year-old obese -American female and mild respiratory distress. HENT:      Head: Normocephalic and atraumatic. Right Ear: External ear normal.      Left Ear: External ear normal.      Nose: Nose normal.      Mouth/Throat:      Mouth: Mucous membranes are moist.      Pharynx: Oropharynx is clear. No oropharyngeal exudate. Eyes:      General: No scleral icterus. Right eye: No discharge. Left eye: No discharge. Extraocular Movements: Extraocular movements intact. Conjunctiva/sclera: Conjunctivae normal.      Pupils: Pupils are equal, round, and reactive to light. Neck:      Musculoskeletal: Normal range of motion and neck supple. Thyroid: No thyromegaly. Vascular: No JVD. Trachea: No tracheal deviation. Cardiovascular:      Rate and Rhythm: Normal rate and regular rhythm. Heart sounds: Normal heart sounds. No murmur. No friction rub. No gallop. Pulmonary:      Effort: No respiratory distress. Breath sounds: No stridor. Wheezing present. No rales. Comments: Mild tachypnea  Chest:      Chest wall: No tenderness. Abdominal:      General: Bowel sounds are normal. There is no distension. Palpations: Abdomen is soft. There is no mass. Tenderness: There is no abdominal tenderness. There is no guarding or rebound. Genitourinary:     Vagina: Normal.   Musculoskeletal: Normal range of motion. General: No tenderness. Lymphadenopathy:      Cervical: No cervical adenopathy. Skin:     General: Skin is warm and dry. Capillary Refill: Capillary refill takes less than 2 seconds. Coloration: Skin is not jaundiced or pale. Findings: No erythema or rash. Neurological:      General: No focal deficit present. Mental Status: She is alert and oriented to person, place, and time. Cranial Nerves: No cranial nerve deficit. Deep Tendon Reflexes: Reflexes are normal and symmetric.    Psychiatric:         Behavior: Behavior normal. Judgment: Judgment normal.          MDM  Number of Diagnoses or Management Options  Diagnosis management comments: Differential diagnosis includes: COPD exacerbation, polysubstance abuse, CHF       Amount and/or Complexity of Data Reviewed  Clinical lab tests: reviewed and ordered  Tests in the radiology section of CPT®: ordered and reviewed  Tests in the medicine section of CPT®: ordered and reviewed  Review and summarize past medical records: yes  Independent visualization of images, tracings, or specimens: yes    Risk of Complications, Morbidity, and/or Mortality  Presenting problems: high  Diagnostic procedures: high  Management options: high           Procedures      Orders Placed This Encounter    XR CHEST PORT     Standing Status:   Standing     Number of Occurrences:   1     Order Specific Question:   Reason for Exam     Answer:   Dyspnea    CBC WITH AUTOMATED DIFF     Standing Status:   Standing     Number of Occurrences:   1    CARDIAC PANEL,(CK, CKMB & TROPONIN)     Standing Status:   Standing     Number of Occurrences:   1    MAGNESIUM     Standing Status:   Standing     Number of Occurrences:   1    METABOLIC PANEL, COMPREHENSIVE     Standing Status:   Standing     Number of Occurrences:   1    NT-PRO BNP     Standing Status:   Standing     Number of Occurrences:   1    PHOSPHORUS     Standing Status:   Standing     Number of Occurrences:   1    EKG, 12 LEAD, INITIAL     Standing Status:   Standing     Number of Occurrences:   1     Order Specific Question:   Reason for Exam:     Answer:   Dyspnea    SALINE LOCK IV ONE TIME STAT     Standing Status:   Standing     Number of Occurrences:   1    cloNIDine HCL (CATAPRES) tablet 0.2 mg    oxyCODONE-acetaminophen (PERCOCET) 5-325 mg per tablet 1 Tab    predniSONE (DELTASONE) 50 mg tablet     Sig: Take 1 Tab by mouth daily for 5 days.      Dispense:  5 Tab     Refill:  0    potassium chloride (K-DUR, KLOR-CON) SR tablet 40 mEq     Recent Results (from the past 12 hour(s))   CBC WITH AUTOMATED DIFF    Collection Time: 03/24/21  8:20 AM   Result Value Ref Range    WBC 9.9 4.6 - 13.2 K/uL    RBC 3.97 (L) 4.20 - 5.30 M/uL    HGB 11.4 (L) 12.0 - 16.0 g/dL    HCT 37.3 35.0 - 45.0 %    MCV 94.0 74.0 - 97.0 FL    MCH 28.7 24.0 - 34.0 PG    MCHC 30.6 (L) 31.0 - 37.0 g/dL    RDW 14.8 (H) 11.6 - 14.5 %    PLATELET 830 146 - 721 K/uL    MPV 10.8 9.2 - 11.8 FL    NEUTROPHILS 66 40 - 73 %    LYMPHOCYTES 28 21 - 52 %    MONOCYTES 4 3 - 10 %    EOSINOPHILS 2 0 - 5 %    BASOPHILS 0 0 - 2 %    ABS. NEUTROPHILS 6.5 1.8 - 8.0 K/UL    ABS. LYMPHOCYTES 2.8 0.9 - 3.6 K/UL    ABS. MONOCYTES 0.4 0.05 - 1.2 K/UL    ABS. EOSINOPHILS 0.2 0.0 - 0.4 K/UL    ABS.  BASOPHILS 0.0 0.0 - 0.1 K/UL    DF AUTOMATED     EKG, 12 LEAD, INITIAL    Collection Time: 03/24/21  8:44 AM   Result Value Ref Range    Ventricular Rate 80 BPM    Atrial Rate 80 BPM    P-R Interval 120 ms    QRS Duration 136 ms    Q-T Interval 430 ms    QTC Calculation (Bezet) 495 ms    Calculated P Axis 52 degrees    Calculated R Axis -61 degrees    Calculated T Axis 100 degrees    Diagnosis       Normal sinus rhythm  Left axis deviation  Left bundle branch block  Abnormal ECG  When compared with ECG of 18-MAR-2021 02:06,  No significant change was found    Confirmed by Fredo Aguilera M.D., [address] [place] [address] ([id number]) on 3/24/2021 1:27:30 PM     CARDIAC PANEL,(CK, CKMB & TROPONIN)    Collection Time: 03/24/21 12:10 PM   Result Value Ref Range    CK - MB 4.1 (H) <3.6 ng/ml    CK-MB Index 9.3 (H) 0.0 - 4.0 %    CK 44 26 - 192 U/L    Troponin-I, QT 0.04 0.0 - 0.045 NG/ML   MAGNESIUM    Collection Time: 03/24/21 12:10 PM   Result Value Ref Range    Magnesium 2.2 1.6 - 2.6 mg/dL   METABOLIC PANEL, COMPREHENSIVE    Collection Time: 03/24/21 12:10 PM   Result Value Ref Range    Sodium 143 136 - 145 mmol/L    Potassium 3.2 (L) 3.5 - 5.5 mmol/L    Chloride 106 100 - 111 mmol/L    CO2 27 21 - 32 mmol/L    Anion gap 10 3.0 - 18 mmol/L    Glucose 305 (H) 74 - 99 mg/dL    BUN 9 7.0 - 18 MG/DL    Creatinine 1.20 0.6 - 1.3 MG/DL    BUN/Creatinine ratio 8 (L) 12 - 20      GFR est AA 56 (L) >60 ml/min/1.73m2    GFR est non-AA 46 (L) >60 ml/min/1.73m2    Calcium 8.1 (L) 8.5 - 10.1 MG/DL    Bilirubin, total 0.3 0.2 - 1.0 MG/DL    ALT (SGPT) 30 13 - 56 U/L    AST (SGOT) 17 10 - 38 U/L    Alk. phosphatase 75 45 - 117 U/L    Protein, total 6.1 (L) 6.4 - 8.2 g/dL    Albumin 3.0 (L) 3.4 - 5.0 g/dL    Globulin 3.1 2.0 - 4.0 g/dL    A-G Ratio 1.0 0.8 - 1.7     NT-PRO BNP    Collection Time: 03/24/21 12:10 PM   Result Value Ref Range    NT pro-BNP 1,966 (H) 0 - 900 PG/ML   PHOSPHORUS    Collection Time: 03/24/21 12:10 PM   Result Value Ref Range    Phosphorus 1.1 (L) 2.5 - 4.9 MG/DL     XR CHEST PORT   Final Result   Stable chronic cardiomegaly and mild vascular congestion. 12:00 pm -is resting on the stretcher comfortably in no acute distress. She ingested multiple meals without difficulty or return of her wheezing and shortness of breath. Patient is chronically on O2 4 to 5 L via nasal cannula. Reviewed the patient's prior echo which showed a normal EF with no left ventricular dysfunction. 2:00 PM Upon re-evaluation the patient's symptoms have improved. Pt has non-toxic appearance and condition is stable for discharge. She was informed of her results, instructed to f/u with her PCP, Cardiology and return to the ED upon worsening of symptoms. All questions and concerns were addressed. 3:49 PM -I was notified by the patient's nurse that she was refusing to leave the emergency room and refused to take her discharge papers. She began to use excessive profanity and threatening this writer. Patient reported that she was given a prescription for 6 tablets on March 9, 2021 by Dr. Venus Wolff and a 7-day supply of Percocet on March 1, 2021 by Dr. Kris Barnes. Patient did not appear to be in any distress. Patient began yelling racial epithet.   She wrote on her discharge paper \"Kiss my black ass. \" and told the nurse to deliver the message to this writer. The patient then proceeded to walk to the main nursing station and threatened this writer. I requested that security and the nursing supervisor be call to the ED to have the patient escorted out of the ED. ED nursing director was made aware and I will inform the ED medical director of the patient verbal threat. Diagnosis:   1. Acute exacerbation of chronic obstructive pulmonary disease (COPD) ([address]    2. Hypokalemia    3. Uncontrolled hypertension    4. Hypophosphatemia    5. Drug-seeking behavior          Disposition: Discharge home    Follow-up Information     Follow up With Specialties Details Why Contact Info    Edgar Elena MD Internal Medicine Schedule an appointment as soon as possible for a visit in 1 day  [address] [place] [facility] EMERGENCY DEPT Emergency Medicine  As needed, If symptoms worsen [address] [place] [address]  [phone]    Tracie Mohamud MD Cardiology, Internal Medicine, Nuclear Medicine Schedule an appointment as soon as possible for a visit in 2 days  [facility]  [phone]            Patient's Medications   Start Taking    PREDNISONE (DELTASONE) 50 MG TABLET    Take 1 Tab by mouth daily for 5 days. Continue Taking    ALBUTEROL (PROVENTIL VENTOLIN) 2.5 MG /3 ML (0.083 %) NEBU    1 nebulizer treatment every 4 hours while awake for 1 week then every 4 hours as needed for shortness of breath    ASPIRIN DELAYED-RELEASE 81 MG TABLET    Take 1 Tab by mouth daily. ATORVASTATIN (LIPITOR) 20 MG TABLET    Take 1 Tab by mouth nightly. CITALOPRAM (CELEXA) 20 MG TABLET    Take 1 Tab by mouth every morning. CLONIDINE HCL (CATAPRES) 0.2 MG TABLET    Take 1 Tab by mouth every twelve (12) hours. FAMOTIDINE (PEPCID) 20 MG TABLET    Take 1 Tab by mouth nightly.     FLUTICASONE FUROATE-VILANTEROL (BREO ELLIPTA) 200-25 MCG/DOSE INHALER    Take 1 Puff by inhalation daily. Indications: controller medication for asthma    LOSARTAN (COZAAR) 25 MG TABLET    Take 1 Tab by mouth daily. Indications: high blood pressure    MONTELUKAST (SINGULAIR) 10 MG TABLET    Take 1 Tab by mouth nightly. Indications: controller medication for asthma    NALOXONE (NARCAN) 4 MG/ACTUATION NASAL SPRAY    Use 1 spray intranasally, then discard. Repeat with new spray every 2 min as needed for opioid overdose symptoms, alternating nostrils. NEBULIZER & COMPRESSOR MACHINE    1 Each by Does Not Apply route as needed for Wheezing or Shortness of Breath. NITROGLYCERIN (NITROSTAT) 0.4 MG SL TABLET    Take 1 Tab by mouth every five (5) minutes as needed for Chest Pain. Sit/Lay down then put one tab under the tongue every 5 minutes as needed for chest pain for 3 doses    OXYGEN-AIR DELIVERY SYSTEMS    3 L by IntraNASal route as needed for Other (sob). ROFLUMILAST (DALIRESP) 500 MCG TAB TABLET    Take 1 Tab by mouth daily.    These Medications have changed    No medications on file   Stop Taking    No medications on file

## 2021-03-24 NOTE — ED TRIAGE NOTES
EMS reports SOB since yesterday; diminish lung fields; speaking in 1-2 word sentences; inspiratory chest pain; pt given 3 albuterol; 1 Atrovent; 125 IV solumedrol; 2 gm Mag IV

## 2021-03-24 NOTE — MANAGEMENT PLAN
Patient Management Plan        Pt Name: Marie faustin :1965        Management Plan by: Arnol Bailon MD                                                                                           Date Placed:  21     Pt's Physicians:         Primary Care:  Elizabeth Liu MD       Contact #:   Specialists:          Contact #:             Summary    Reason for Referral: This patient has been provided a management plan for Other Poor compliance     Patient with frequent visits for: Recurrent congestive heart failure and shortness of breath       Warning/Safety Alert: (statement of safety issue to be aware of)    The patient has multiple emergency department visits and oftentimes requires medical care. The patient will ask for pain medications and sometimes can have difficult interactions with the staff. Situation: Chronic Conditions Summary  (core issues)     Congestive heart failure, substance abuse, COPD,     Goals/Interventions:     During Business Hours: The management plan team would like the patient to have a behavioral evaluation. After Hours:        Challenges for Self Management: Behavioral Health Factors (excessive alcohol, recreational drug use, high risk behaviors) , Motivational level (not engaged, denial of conditions, level of adherence and likelihood of followup)     Medication Management: Complications from poor adherence     Advanced Care Plan: None                      ** This plan has been created by the Care Coordination Committee, a multi-disciplinary team. The patient and their physician were invited to participate in this plan. This management plan is intended to provide consistent evaluation and treatment for this patient not to supersede physician judgment. **

## 2021-03-24 NOTE — ED NOTES
Pt refused to leave R/T not getting more percocet. Pt cursing staff. Pt informed that Percocet is one of her prescribed medications. Had nurse to call [facility] to verify percocet. Robin Atkinson Pharmacist reports patient was prescribed percocet 3/1/21 by John Rosa and 3/9/21 by ER physician; should be on Suboxone  But has not has refilled in a while. Dr Keith Williamson informed and will not write order for percocet; Dr Keith Williamson also discussed [address] with Dr Hari Garza

## 2021-03-25 NOTE — DISCHARGE SUMMARY
AMA  Discharge Summary       PATIENT ID: Margie Saldivar  MRN: [id number]   YOB: 1965    DATE OF ADMISSION: 3/9/2021  8:06 PM    DATE OF DISCHARGE: 03/25/21    PRIMARY CARE PROVIDER: Erin Lafleur MD     ATTENDING PHYSICIAN: Josh Lake MD  DISCHARGING PROVIDER: Josh Lake MD        CONSULTATIONS: None    PROCEDURES/SURGERIES: * No surgery found *    ADMITTING [address] [place] [address] COURSE:   HISTORY OF PRESENT ILLNESS:     Zen Esposito is a 63 y.o.   female with history of chronic respiratory failure and, polysubstance abuse well-known to our service for frequent admissions due to respiratory complaints who presents with above-stated complaints. She states she never got better and mentions that her symptoms were worse since her last hospitalization a few weeks ago and especially worse over the last few days. She denies fevers but does report having chills. She denies contact with Covid positive patients. She reports cough that is productive of \"sticky \"sputum. She has prominent complaints of abdominal pain associated with cough, also complains of chest pressure. In the ED she is noted to have what appears to be new left bundle branch block on EKG although her troponin remains normal x3. We have been asked to see patient for further evaluation and management.              DISCHARGE DIAGNOSES / PLAN:      Admission Summary:   Admitted on 3/9 with sob and cp     Interval history / Subjective:   Cardiology has no further plans for cardiac workup      Assessment & Plan:      COPD exacerbation  - cont arformoterol nebs, solumedrol iv, singulair, iv zithromycin, duonebs     Chest pian  - ef preserved, no trop elevation, new LBBB evaluated by cardiology, no further workup indicated at this time     Pt still has significant abnormal lung sounds, as well as increased work of breathing at time.  Unclear source of agitation/anger is unfortunately hindering her treatment plan, she seems unwilling to do 6 min walk on her self reported 5 liters home 02. Says she is too short of breath, but her pulse ox is currently 98%. She is requesting narcotics for generalized pian, she is not enrolled in pain mgmt, or on chronic home meds. I have explained that copd execerbation is not treated with narcotics.       Pt leaves against medical advise.       CHRONIC MEDICAL DIAGNOSES:  Problem List as of 3/12/2021 Date Reviewed: 10/23/2019          Codes Class Noted - Resolved    Chest pain at rest ICD-10-CM: R07.9  ICD-9-CM: 786.50  3/10/2021 - Present        Hypokalemia ICD-10-CM: E87.6  ICD-9-CM: 276.8  2/26/2021 - Present        Bilateral lower leg cellulitis ICD-10-CM: L03.116, L03.115  ICD-9-CM: 682.6  2/26/2021 - Present        Long QT interval ICD-10-CM: R94.31  ICD-9-CM: 794.31  1/8/2021 - Present        CHF (congestive heart failure) (HCC) ICD-10-CM: I50.9  ICD-9-CM: 428.0  12/23/2020 - Present        Acute bronchitis with chronic obstructive pulmonary disease (COPD) ([facility] ICD-10-CM: J44.0, J20.9  ICD-9-CM: 491.22  11/28/2020 - Present        Asthma exacerbation ICD-10-CM: J45.901  ICD-9-CM: 493.92  11/12/2020 - Present        COPD (chronic obstructive pulmonary disease) ([facility] ICD-10-CM: J44.9  ICD-9-CM: 603  11/12/2020 - Present        UTI (urinary tract infection) ICD-10-CM: N39.0  ICD-9-CM: 599.0  10/8/2020 - Present        Atypical chest pain ICD-10-CM: R07.89  ICD-9-CM: 786.59  10/8/2020 - Present        DEMARCUS (acute kidney injury) ([facility] ICD-10-CM: N17.9  ICD-9-CM: 584.9  10/8/2020 - Present        Acute exacerbation of chronic obstructive pulmonary disease (COPD) ([facility] ICD-10-CM: J44.1  ICD-9-CM: 491.21  9/14/2020 - Present        COPD with acute exacerbation ([facility] ICD-10-CM: J44.1  ICD-9-CM: 491.21  9/7/2020 - Present        Chronic respiratory failure with hypoxia (HCC) ICD-10-CM: J96.11  ICD-9-CM: 518.83, 799.02  9/7/2020 - Present        Acute bronchitis ICD-10-CM: J20.9  ICD-9-CM: 466.0  9/7/2020 - Present        Suspected COVID-19 virus infection ICD-10-CM: Z20.822  ICD-9-CM: V01.79  9/7/2020 - Present        Tachycardia ICD-10-CM: R00.0  ICD-9-CM: 785.0  9/7/2020 - Present        Normocytic anemia ICD-10-CM: D64.9  ICD-9-CM: 285.9  9/7/2020 - Present        Acute respiratory distress ICD-10-CM: R06.03  ICD-9-CM: 518.82  9/7/2020 - Present        COPD exacerbation ([facility] ICD-10-CM: J44.1  ICD-9-CM: 491.21  9/7/2020 - Present        Depression ICD-10-CM: F32.9  ICD-9-CM: 500  3/22/2020 - Present        S/P hernia repair ICD-10-CM: N23.456, Z87.19  ICD-9-CM: V45.89  10/31/2019 - Present        CKD (chronic kidney disease) stage 3, GFR 30-59 ml/min ICD-10-CM: N18.30  ICD-9-CM: 585.3  10/31/2019 - Present        Tobacco use ICD-10-CM: Z72.0  ICD-9-CM: 305.1  9/28/2019 - Present        T wave inversion in EKG ICD-10-CM: R94.31  ICD-9-CM: 794.31  3/9/2019 - Present        HLD (hyperlipidemia) ICD-10-CM: E78.5  ICD-9-CM: 272.4  9/15/2018 - Present        Polysubstance abuse ([facility] ICD-10-CM: F19.10  ICD-9-CM: 305.90  9/15/2018 - Present        Hypertensive heart failure ([facility] ICD-10-CM: I11.0  ICD-9-CM: 402.91, 428.9  12/19/2017 - Present        Sleep apnea ICD-10-CM: G47.30  ICD-9-CM: 780.57  12/19/2017 - Present        COPD (chronic obstructive pulmonary disease) with chronic bronchitis ([facility] ICD-10-CM: J44.9  ICD-9-CM: 491.20  12/19/2017 - Present        Cocaine abuse ([facility] ICD-10-CM: F14.10  ICD-9-CM: 305.60  11/26/2017 - Present        Essential hypertension ICD-10-CM: I10  ICD-9-CM: 401.9  3/10/2017 - Present        Morbid obesity due to excess calories ([facility] ICD-10-CM: E66.01  ICD-9-CM: 278.01  3/10/2017 - Present        Acute exacerbation of COPD with asthma ([facility] ICD-10-CM: J44.1, J45.901  ICD-9-CM: 493.22  3/2/2017 - Present        Respiratory failure ([facility] ICD-10-CM: J96.90  ICD-9-CM: 518.81  1/11/2017 - Present        Fibromyalgia ICD-10-CM: M79.7  ICD-9-CM: 729.1  12/16/2016 - Present        Uncomplicated severe persistent asthma ICD-10-CM: J45.50  ICD-9-CM: 493.90  12/13/2016 - Present        Vocal cord disease ICD-10-CM: J38.3  ICD-9-CM: 478.5  12/7/2016 - Present        Drug-seeking behavior ICD-10-CM: Z76.5  ICD-9-CM: 305.90  11/19/2016 - Present        Gastroesophageal reflux disease without esophagitis ICD-10-CM: K21.9  ICD-9-CM: 530.81  10/10/2016 - Present        Thyroid goiter ICD-10-CM: E04.9  ICD-9-CM: 240.9  6/30/2016 - Present        Asthma ICD-10-CM: J45.909  ICD-9-CM: 493.90  3/5/2016 - Present        On home O2 ICD-10-CM: Z99.81  ICD-9-CM: V46.2  12/3/2015 - Present    Overview Signed 3/22/2020 10:33 PM by DARCIE Chen     Overview:   2L at home per pt for approx 8 years             Abnormal CT of the chest ICD-10-CM: R93.89  ICD-9-CM: 793.2  11/20/2015 - Present        MDRO (multiple drug resistant organisms) resistance ICD-10-CM: QYY7929  ICD-9-CM: V09.91  11/8/2012 - Present        Fe deficiency anemia ICD-10-CM: D50.9  ICD-9-CM: 280.9  10/27/2012 - Present        RESOLVED: CAP (community acquired pneumonia) ICD-10-CM: J18.9  ICD-9-CM: 996  9/28/2019 - 10/13/2019        RESOLVED: Chest pain on breathing ICD-10-CM: R07.1  ICD-9-CM: 786.52  3/10/2017 - 10/13/2019        RESOLVED: COPD with exacerbation ([address] ICD-10-CM: J44.1  ICD-9-CM: 491.21  7/11/2016 - 9/28/2019        RESOLVED: COPD exacerbation ([address] ICD-10-CM: J44.1  ICD-9-CM: 491.21  3/12/2016 - 9/28/2019        RESOLVED: Shortness of breath ICD-10-CM: R06.02  ICD-9-CM: 786.05  3/5/2016 - 9/28/2019        RESOLVED: COPD exacerbation ([facility] ICD-10-CM: J44.1  ICD-9-CM: 491.21  2/2/2016 - 2/8/2016        RESOLVED: COPD with exacerbation ([facility] ICD-10-CM: J44.1  ICD-9-CM: 491.21  2/2/2016 - 2/8/2016        RESOLVED: COPD with acute exacerbation ([facility] ICD-10-CM: J44.1  ICD-9-CM: 491.21  11/17/2015 - 11/20/2015        RESOLVED: Asthma exacerbation ICD-10-CM: J45.901  ICD-9-CM: 493.92  11/17/2015 - 11/20/2015              Signed:   Evaline Plate Sofia España MD  3/25/2021  11:44 AM

## 2021-03-27 ENCOUNTER — APPOINTMENT (OUTPATIENT)
Dept: GENERAL RADIOLOGY | Age: 56
End: 2021-03-27
Attending: EMERGENCY MEDICINE
Payer: MEDICAID

## 2021-03-27 ENCOUNTER — HOSPITAL ENCOUNTER (OUTPATIENT)
Age: 56
Setting detail: OBSERVATION
Discharge: LEFT BEFORE DISCHARGE | End: 2021-03-29
Attending: EMERGENCY MEDICINE | Admitting: EMERGENCY MEDICINE
Payer: MEDICAID

## 2021-03-27 DIAGNOSIS — J44.1 ACUTE EXACERBATION OF CHRONIC OBSTRUCTIVE PULMONARY DISEASE (COPD) (HCC): Primary | ICD-10-CM

## 2021-03-27 DIAGNOSIS — J45.901 ACUTE EXACERBATION OF COPD WITH ASTHMA (HCC): ICD-10-CM

## 2021-03-27 DIAGNOSIS — J44.1 ACUTE EXACERBATION OF COPD WITH ASTHMA (HCC): ICD-10-CM

## 2021-03-27 DIAGNOSIS — Z99.89 BIPAP (BIPHASIC POSITIVE AIRWAY PRESSURE) DEPENDENCE: ICD-10-CM

## 2021-03-27 DIAGNOSIS — J96.01 ACUTE RESPIRATORY FAILURE WITH HYPOXIA AND HYPERCAPNIA (HCC): ICD-10-CM

## 2021-03-27 DIAGNOSIS — J96.02 ACUTE RESPIRATORY FAILURE WITH HYPOXIA AND HYPERCAPNIA (HCC): ICD-10-CM

## 2021-03-27 LAB
ALBUMIN SERPL-MCNC: 3.8 G/DL (ref 3.4–5)
ALBUMIN/GLOB SERPL: 1 {RATIO} (ref 0.8–1.7)
ALP SERPL-CCNC: 116 U/L (ref 45–117)
ALT SERPL-CCNC: 44 U/L (ref 13–56)
ANION GAP SERPL CALC-SCNC: 3 MMOL/L (ref 3–18)
ARTERIAL PATENCY WRIST A: YES
AST SERPL-CCNC: 57 U/L (ref 10–38)
BASE EXCESS BLD CALC-SCNC: 3 MMOL/L
BASOPHILS # BLD: 0 K/UL (ref 0–0.1)
BASOPHILS NFR BLD: 0 % (ref 0–2)
BDY SITE: ABNORMAL
BILIRUB SERPL-MCNC: 0.4 MG/DL (ref 0.2–1)
BNP SERPL-MCNC: 2684 PG/ML (ref 0–900)
BUN SERPL-MCNC: 14 MG/DL (ref 7–18)
BUN/CREAT SERPL: 14 (ref 12–20)
CALCIUM SERPL-MCNC: 8.5 MG/DL (ref 8.5–10.1)
CHLORIDE SERPL-SCNC: 104 MMOL/L (ref 100–111)
CK MB CFR SERPL CALC: 5.4 % (ref 0–4)
CK MB SERPL-MCNC: 5.2 NG/ML (ref 5–25)
CK SERPL-CCNC: 97 U/L (ref 26–192)
CO2 SERPL-SCNC: 33 MMOL/L (ref 21–32)
CREAT SERPL-MCNC: 0.99 MG/DL (ref 0.6–1.3)
CRP SERPL-MCNC: 0.7 MG/DL (ref 0–0.3)
DIFFERENTIAL METHOD BLD: ABNORMAL
EOSINOPHIL # BLD: 0.1 K/UL (ref 0–0.4)
EOSINOPHIL NFR BLD: 1 % (ref 0–5)
ERYTHROCYTE [DISTWIDTH] IN BLOOD BY AUTOMATED COUNT: 15 % (ref 11.6–14.5)
FERRITIN SERPL-MCNC: 36 NG/ML (ref 8–388)
GAS FLOW.O2 O2 DELIVERY SYS: ABNORMAL L/MIN
GLOBULIN SER CALC-MCNC: 3.7 G/DL (ref 2–4)
GLUCOSE SERPL-MCNC: 110 MG/DL (ref 74–99)
HCO3 BLD-SCNC: 32 MMOL/L (ref 22–26)
HCT VFR BLD AUTO: 40.7 % (ref 35–45)
HGB BLD-MCNC: 12.9 G/DL (ref 12–16)
LACTATE BLD-SCNC: 0.94 MMOL/L (ref 0.4–2)
LDH SERPL L TO P-CCNC: 405 U/L (ref 81–234)
LYMPHOCYTES # BLD: 1.9 K/UL (ref 0.9–3.6)
LYMPHOCYTES NFR BLD: 18 % (ref 21–52)
MCH RBC QN AUTO: 29.5 PG (ref 24–34)
MCHC RBC AUTO-ENTMCNC: 31.7 G/DL (ref 31–37)
MCV RBC AUTO: 93.1 FL (ref 74–97)
MONOCYTES # BLD: 0.7 K/UL (ref 0.05–1.2)
MONOCYTES NFR BLD: 7 % (ref 3–10)
NEUTS SEG # BLD: 7.8 K/UL (ref 1.8–8)
NEUTS SEG NFR BLD: 74 % (ref 40–73)
O2/TOTAL GAS SETTING VFR VENT: 45 %
PCO2 BLD: 70.9 MMHG (ref 35–45)
PEEP RESPIRATORY: 12 CMH2O
PH BLD: 7.26 [PH] (ref 7.35–7.45)
PIP ISTAT,IPIP: 18
PLATELET # BLD AUTO: 270 K/UL (ref 135–420)
PMV BLD AUTO: 10.4 FL (ref 9.2–11.8)
PO2 BLD: 147 MMHG (ref 80–100)
POTASSIUM SERPL-SCNC: 3.2 MMOL/L (ref 3.5–5.5)
PROCALCITONIN SERPL-MCNC: <0.05 NG/ML
PROT SERPL-MCNC: 7.5 G/DL (ref 6.4–8.2)
RBC # BLD AUTO: 4.37 M/UL (ref 4.2–5.3)
SAO2 % BLD: 99 % (ref 92–97)
SERVICE CMNT-IMP: ABNORMAL
SODIUM SERPL-SCNC: 140 MMOL/L (ref 136–145)
SPECIMEN TYPE: ABNORMAL
TROPONIN I SERPL-MCNC: 0.07 NG/ML (ref 0–0.04)
WBC # BLD AUTO: 10.5 K/UL (ref 4.6–13.2)

## 2021-03-27 PROCEDURE — 96372 THER/PROPH/DIAG INJ SC/IM: CPT

## 2021-03-27 PROCEDURE — 83880 ASSAY OF NATRIURETIC PEPTIDE: CPT

## 2021-03-27 PROCEDURE — 83605 ASSAY OF LACTIC ACID: CPT

## 2021-03-27 PROCEDURE — 94660 CPAP INITIATION&MGMT: CPT

## 2021-03-27 PROCEDURE — 85025 COMPLETE CBC W/AUTO DIFF WBC: CPT

## 2021-03-27 PROCEDURE — 36600 WITHDRAWAL OF ARTERIAL BLOOD: CPT

## 2021-03-27 PROCEDURE — 86140 C-REACTIVE PROTEIN: CPT

## 2021-03-27 PROCEDURE — 87040 BLOOD CULTURE FOR BACTERIA: CPT

## 2021-03-27 PROCEDURE — 82553 CREATINE MB FRACTION: CPT

## 2021-03-27 PROCEDURE — 82728 ASSAY OF FERRITIN: CPT

## 2021-03-27 PROCEDURE — 0202U NFCT DS 22 TRGT SARS-COV-2: CPT

## 2021-03-27 PROCEDURE — 99285 EMERGENCY DEPT VISIT HI MDM: CPT

## 2021-03-27 PROCEDURE — 80053 COMPREHEN METABOLIC PANEL: CPT

## 2021-03-27 PROCEDURE — 82803 BLOOD GASES ANY COMBINATION: CPT

## 2021-03-27 PROCEDURE — 83615 LACTATE (LD) (LDH) ENZYME: CPT

## 2021-03-27 PROCEDURE — 74011250636 HC RX REV CODE- 250/636

## 2021-03-27 PROCEDURE — 71045 X-RAY EXAM CHEST 1 VIEW: CPT

## 2021-03-27 PROCEDURE — 84145 PROCALCITONIN (PCT): CPT

## 2021-03-27 PROCEDURE — 74011000250 HC RX REV CODE- 250

## 2021-03-27 PROCEDURE — 93005 ELECTROCARDIOGRAM TRACING: CPT

## 2021-03-27 PROCEDURE — 96374 THER/PROPH/DIAG INJ IV PUSH: CPT

## 2021-03-27 RX ORDER — EPINEPHRINE 1 MG/ML
INJECTION, SOLUTION, CONCENTRATE INTRAVENOUS
Status: COMPLETED
Start: 2021-03-27 | End: 2021-03-27

## 2021-03-27 RX ORDER — TERBUTALINE SULFATE 1 MG/ML
INJECTION SUBCUTANEOUS
Status: COMPLETED
Start: 2021-03-27 | End: 2021-03-27

## 2021-03-27 RX ORDER — MAGNESIUM SULFATE HEPTAHYDRATE 40 MG/ML
INJECTION, SOLUTION INTRAVENOUS
Status: COMPLETED
Start: 2021-03-27 | End: 2021-03-27

## 2021-03-27 RX ORDER — IPRATROPIUM BROMIDE AND ALBUTEROL SULFATE 2.5; .5 MG/3ML; MG/3ML
SOLUTION RESPIRATORY (INHALATION)
Status: COMPLETED
Start: 2021-03-27 | End: 2021-03-27

## 2021-03-27 RX ORDER — SODIUM CHLORIDE 0.9 % (FLUSH) 0.9 %
5-10 SYRINGE (ML) INJECTION AS NEEDED
Status: DISCONTINUED | OUTPATIENT
Start: 2021-03-27 | End: 2021-03-29 | Stop reason: HOSPADM

## 2021-03-27 RX ORDER — TERBUTALINE SULFATE 1 MG/ML
0.25 INJECTION SUBCUTANEOUS
Status: DISCONTINUED | OUTPATIENT
Start: 2021-03-27 | End: 2021-03-27

## 2021-03-27 RX ADMIN — EPINEPHRINE 0.3 MG: 1 INJECTION, SOLUTION, CONCENTRATE INTRAVENOUS at 21:57

## 2021-03-27 RX ADMIN — MAGNESIUM SULFATE HEPTAHYDRATE 2 G: 40 INJECTION, SOLUTION INTRAVENOUS at 21:57

## 2021-03-27 RX ADMIN — IPRATROPIUM BROMIDE AND ALBUTEROL SULFATE 9 ML: .5; 3 SOLUTION RESPIRATORY (INHALATION) at 21:58

## 2021-03-27 RX ADMIN — TERBUTALINE SULFATE 0.25 MG: 1 INJECTION SUBCUTANEOUS at 21:58

## 2021-03-27 RX ADMIN — TERBUTALINE SULFATE 0.25 MG: 1 INJECTION, SOLUTION SUBCUTANEOUS at 21:58

## 2021-03-27 NOTE — Clinical Note
Status[de-identified] INPATIENT [101]   Type of Bed: Stepdown [17]   Inpatient Hospitalization Certified Necessary for the Following Reasons: 3.  Patient receiving treatment that can only be provided in an inpatient setting (further clarification in H&P documentation)   Admitting Diagnosis: Acute exacerbation of chronic obstructive pulmonary disease (COPD) ([facility] [[id number]]   Admitting Diagnosis: Acute respiratory failure with hypoxia and hypercapnia ([facility] [9377200]   Admitting Diagnosis: BiPAP (biphasic positive airway pressure) dependence [6984690]   Admitting Physician: Holley Up   Attending Physician: Holley Up   Estimated Length of Stay: 2 Midnights   Discharge Plan[de-identified] Home with Office Follow-up

## 2021-03-28 PROBLEM — Z99.89 BIPAP (BIPHASIC POSITIVE AIRWAY PRESSURE) DEPENDENCE: Status: ACTIVE | Noted: 2021-03-28

## 2021-03-28 LAB
AMPHET UR QL SCN: NEGATIVE
APPEARANCE UR: CLEAR
ARTERIAL PATENCY WRIST A: YES
ATRIAL RATE: 80 BPM
B PERT DNA SPEC QL NAA+PROBE: NOT DETECTED
BACTERIA URNS QL MICRO: ABNORMAL /HPF
BARBITURATES UR QL SCN: NEGATIVE
BASE EXCESS BLD CALC-SCNC: 3 MMOL/L
BDY SITE: ABNORMAL
BENZODIAZ UR QL: NEGATIVE
BILIRUB UR QL: NEGATIVE
BORDETELLA PARAPERTUSSIS PCR, BORPAR: NOT DETECTED
C PNEUM DNA SPEC QL NAA+PROBE: NOT DETECTED
CALCULATED P AXIS, ECG09: 71 DEGREES
CALCULATED R AXIS, ECG10: 69 DEGREES
CALCULATED T AXIS, ECG11: 48 DEGREES
CANNABINOIDS UR QL SCN: NEGATIVE
CK MB CFR SERPL CALC: 7.1 % (ref 0–4)
CK MB SERPL-MCNC: 4 NG/ML (ref 5–25)
CK SERPL-CCNC: 56 U/L (ref 26–192)
COCAINE UR QL SCN: POSITIVE
COLOR UR: YELLOW
DIAGNOSIS, 93000: NORMAL
EPITH CASTS URNS QL MICRO: ABNORMAL /LPF (ref 0–5)
FLUAV SUBTYP SPEC NAA+PROBE: NOT DETECTED
FLUBV RNA SPEC QL NAA+PROBE: NOT DETECTED
GAS FLOW.O2 O2 DELIVERY SYS: ABNORMAL L/MIN
GLUCOSE UR STRIP.AUTO-MCNC: 100 MG/DL
HADV DNA SPEC QL NAA+PROBE: NOT DETECTED
HCO3 BLD-SCNC: 30.3 MMOL/L (ref 22–26)
HCOV 229E RNA SPEC QL NAA+PROBE: NOT DETECTED
HCOV HKU1 RNA SPEC QL NAA+PROBE: NOT DETECTED
HCOV NL63 RNA SPEC QL NAA+PROBE: NOT DETECTED
HCOV OC43 RNA SPEC QL NAA+PROBE: NOT DETECTED
HDSCOM,HDSCOM: ABNORMAL
HGB UR QL STRIP: ABNORMAL
HMPV RNA SPEC QL NAA+PROBE: NOT DETECTED
HPIV1 RNA SPEC QL NAA+PROBE: NOT DETECTED
HPIV2 RNA SPEC QL NAA+PROBE: NOT DETECTED
HPIV3 RNA SPEC QL NAA+PROBE: NOT DETECTED
HPIV4 RNA SPEC QL NAA+PROBE: NOT DETECTED
INR PPP: 1.1 (ref 0.8–1.2)
KETONES UR QL STRIP.AUTO: NEGATIVE MG/DL
LEUKOCYTE ESTERASE UR QL STRIP.AUTO: ABNORMAL
M PNEUMO DNA SPEC QL NAA+PROBE: NOT DETECTED
METHADONE UR QL: NEGATIVE
NITRITE UR QL STRIP.AUTO: NEGATIVE
O2/TOTAL GAS SETTING VFR VENT: 40 %
OPIATES UR QL: POSITIVE
P-R INTERVAL, ECG05: 130 MS
PCO2 BLD: 57.4 MMHG (ref 35–45)
PCP UR QL: NEGATIVE
PEEP RESPIRATORY: 12 CMH2O
PH BLD: 7.33 [PH] (ref 7.35–7.45)
PH UR STRIP: 6 [PH] (ref 5–8)
PIP ISTAT,IPIP: 18
PO2 BLD: 153 MMHG (ref 80–100)
PROT UR STRIP-MCNC: ABNORMAL MG/DL
PROTHROMBIN TIME: 13.9 SEC (ref 11.5–15.2)
Q-T INTERVAL, ECG07: 360 MS
QRS DURATION, ECG06: 80 MS
QTC CALCULATION (BEZET), ECG08: 415 MS
RBC #/AREA URNS HPF: ABNORMAL /HPF (ref 0–5)
RSV RNA SPEC QL NAA+PROBE: NOT DETECTED
RV+EV RNA SPEC QL NAA+PROBE: NOT DETECTED
SAO2 % BLD: 99 % (ref 92–97)
SARS-COV-2 PCR, COVPCR: NOT DETECTED
SERVICE CMNT-IMP: ABNORMAL
SP GR UR REFRACTOMETRY: 1.01 (ref 1–1.03)
SPECIMEN TYPE: ABNORMAL
TROPONIN I SERPL-MCNC: 0.05 NG/ML (ref 0–0.04)
UROBILINOGEN UR QL STRIP.AUTO: 0.2 EU/DL (ref 0.2–1)
VENTRICULAR RATE, ECG03: 80 BPM
WBC URNS QL MICRO: ABNORMAL /HPF (ref 0–4)

## 2021-03-28 PROCEDURE — 96375 TX/PRO/DX INJ NEW DRUG ADDON: CPT

## 2021-03-28 PROCEDURE — 82553 CREATINE MB FRACTION: CPT

## 2021-03-28 PROCEDURE — 93005 ELECTROCARDIOGRAM TRACING: CPT

## 2021-03-28 PROCEDURE — 96374 THER/PROPH/DIAG INJ IV PUSH: CPT

## 2021-03-28 PROCEDURE — 99218 HC RM OBSERVATION: CPT

## 2021-03-28 PROCEDURE — 77010033678 HC OXYGEN DAILY

## 2021-03-28 PROCEDURE — 82803 BLOOD GASES ANY COMBINATION: CPT

## 2021-03-28 PROCEDURE — 85610 PROTHROMBIN TIME: CPT

## 2021-03-28 PROCEDURE — 80307 DRUG TEST PRSMV CHEM ANLYZR: CPT

## 2021-03-28 PROCEDURE — 74011250637 HC RX REV CODE- 250/637: Performed by: EMERGENCY MEDICINE

## 2021-03-28 PROCEDURE — 36600 WITHDRAWAL OF ARTERIAL BLOOD: CPT

## 2021-03-28 PROCEDURE — 74011250636 HC RX REV CODE- 250/636: Performed by: EMERGENCY MEDICINE

## 2021-03-28 PROCEDURE — 96372 THER/PROPH/DIAG INJ SC/IM: CPT

## 2021-03-28 PROCEDURE — 65660000000 HC RM CCU STEPDOWN

## 2021-03-28 PROCEDURE — 99223 1ST HOSP IP/OBS HIGH 75: CPT | Performed by: EMERGENCY MEDICINE

## 2021-03-28 PROCEDURE — 81001 URINALYSIS AUTO W/SCOPE: CPT

## 2021-03-28 PROCEDURE — 74011250637 HC RX REV CODE- 250/637: Performed by: FAMILY MEDICINE

## 2021-03-28 PROCEDURE — 74011000250 HC RX REV CODE- 250: Performed by: EMERGENCY MEDICINE

## 2021-03-28 RX ORDER — CLONIDINE 0.2 MG/24H
1 PATCH, EXTENDED RELEASE TRANSDERMAL
Status: DISCONTINUED | OUTPATIENT
Start: 2021-03-28 | End: 2021-03-29 | Stop reason: HOSPADM

## 2021-03-28 RX ORDER — ONDANSETRON 2 MG/ML
4 INJECTION INTRAMUSCULAR; INTRAVENOUS
Status: DISCONTINUED | OUTPATIENT
Start: 2021-03-28 | End: 2021-03-29 | Stop reason: HOSPADM

## 2021-03-28 RX ORDER — POTASSIUM CHLORIDE AND SODIUM CHLORIDE 450; 150 MG/100ML; MG/100ML
INJECTION, SOLUTION INTRAVENOUS CONTINUOUS
Status: DISCONTINUED | OUTPATIENT
Start: 2021-03-28 | End: 2021-03-29 | Stop reason: HOSPADM

## 2021-03-28 RX ORDER — IPRATROPIUM BROMIDE AND ALBUTEROL SULFATE 2.5; .5 MG/3ML; MG/3ML
3 SOLUTION RESPIRATORY (INHALATION)
Status: DISCONTINUED | OUTPATIENT
Start: 2021-03-28 | End: 2021-03-29 | Stop reason: HOSPADM

## 2021-03-28 RX ORDER — ACETAMINOPHEN 325 MG/1
650 TABLET ORAL
Status: DISCONTINUED | OUTPATIENT
Start: 2021-03-28 | End: 2021-03-29 | Stop reason: HOSPADM

## 2021-03-28 RX ORDER — ENOXAPARIN SODIUM 100 MG/ML
40 INJECTION SUBCUTANEOUS DAILY
Status: DISCONTINUED | OUTPATIENT
Start: 2021-03-28 | End: 2021-03-29 | Stop reason: HOSPADM

## 2021-03-28 RX ORDER — PROMETHAZINE HYDROCHLORIDE 12.5 MG/1
12.5 TABLET ORAL
Status: DISCONTINUED | OUTPATIENT
Start: 2021-03-28 | End: 2021-03-29 | Stop reason: HOSPADM

## 2021-03-28 RX ORDER — INSULIN LISPRO 100 [IU]/ML
INJECTION, SOLUTION INTRAVENOUS; SUBCUTANEOUS EVERY 6 HOURS
Status: DISCONTINUED | OUTPATIENT
Start: 2021-03-28 | End: 2021-03-29 | Stop reason: HOSPADM

## 2021-03-28 RX ORDER — MAGNESIUM SULFATE 100 %
4 CRYSTALS MISCELLANEOUS AS NEEDED
Status: DISCONTINUED | OUTPATIENT
Start: 2021-03-28 | End: 2021-03-29 | Stop reason: HOSPADM

## 2021-03-28 RX ORDER — ACETAMINOPHEN 650 MG/1
650 SUPPOSITORY RECTAL
Status: DISCONTINUED | OUTPATIENT
Start: 2021-03-28 | End: 2021-03-29 | Stop reason: HOSPADM

## 2021-03-28 RX ORDER — POLYETHYLENE GLYCOL 3350 17 G/17G
17 POWDER, FOR SOLUTION ORAL DAILY PRN
Status: DISCONTINUED | OUTPATIENT
Start: 2021-03-28 | End: 2021-03-29 | Stop reason: HOSPADM

## 2021-03-28 RX ORDER — ARFORMOTEROL TARTRATE 15 UG/2ML
15 SOLUTION RESPIRATORY (INHALATION)
Status: DISCONTINUED | OUTPATIENT
Start: 2021-03-28 | End: 2021-03-29 | Stop reason: HOSPADM

## 2021-03-28 RX ORDER — HYDRALAZINE HYDROCHLORIDE 20 MG/ML
10 INJECTION INTRAMUSCULAR; INTRAVENOUS
Status: DISCONTINUED | OUTPATIENT
Start: 2021-03-28 | End: 2021-03-29 | Stop reason: HOSPADM

## 2021-03-28 RX ORDER — SODIUM CHLORIDE 0.9 % (FLUSH) 0.9 %
5-40 SYRINGE (ML) INJECTION AS NEEDED
Status: DISCONTINUED | OUTPATIENT
Start: 2021-03-28 | End: 2021-03-29 | Stop reason: HOSPADM

## 2021-03-28 RX ORDER — DEXTROSE 50 % IN WATER (D50W) INTRAVENOUS SYRINGE
25-50 AS NEEDED
Status: DISCONTINUED | OUTPATIENT
Start: 2021-03-28 | End: 2021-03-29 | Stop reason: HOSPADM

## 2021-03-28 RX ORDER — FAMOTIDINE 10 MG/ML
20 INJECTION INTRAVENOUS DAILY
Status: DISCONTINUED | OUTPATIENT
Start: 2021-03-28 | End: 2021-03-29 | Stop reason: HOSPADM

## 2021-03-28 RX ORDER — OXYCODONE AND ACETAMINOPHEN 5; 325 MG/1; MG/1
1 TABLET ORAL
Status: DISCONTINUED | OUTPATIENT
Start: 2021-03-28 | End: 2021-03-29 | Stop reason: HOSPADM

## 2021-03-28 RX ORDER — BUDESONIDE 0.5 MG/2ML
500 INHALANT ORAL
Status: DISCONTINUED | OUTPATIENT
Start: 2021-03-28 | End: 2021-03-29 | Stop reason: HOSPADM

## 2021-03-28 RX ORDER — SODIUM CHLORIDE 0.9 % (FLUSH) 0.9 %
5-40 SYRINGE (ML) INJECTION EVERY 8 HOURS
Status: DISCONTINUED | OUTPATIENT
Start: 2021-03-28 | End: 2021-03-29 | Stop reason: HOSPADM

## 2021-03-28 RX ADMIN — FAMOTIDINE 20 MG: 10 INJECTION INTRAVENOUS at 09:07

## 2021-03-28 RX ADMIN — Medication 10 ML: at 19:05

## 2021-03-28 RX ADMIN — METHYLPREDNISOLONE SODIUM SUCCINATE 40 MG: 40 INJECTION, POWDER, FOR SOLUTION INTRAMUSCULAR; INTRAVENOUS at 19:05

## 2021-03-28 RX ADMIN — Medication 10 ML: at 06:00

## 2021-03-28 RX ADMIN — OXYCODONE AND ACETAMINOPHEN 1 TABLET: 5; 325 TABLET ORAL at 22:44

## 2021-03-28 RX ADMIN — ARFORMOTEROL TARTRATE 15 MCG: 15 SOLUTION RESPIRATORY (INHALATION) at 08:45

## 2021-03-28 RX ADMIN — IPRATROPIUM BROMIDE AND ALBUTEROL SULFATE 3 ML: .5; 3 SOLUTION RESPIRATORY (INHALATION) at 17:59

## 2021-03-28 RX ADMIN — OXYCODONE AND ACETAMINOPHEN 1 TABLET: 5; 325 TABLET ORAL at 12:51

## 2021-03-28 RX ADMIN — HYDRALAZINE HYDROCHLORIDE 10 MG: 20 INJECTION INTRAMUSCULAR; INTRAVENOUS at 19:05

## 2021-03-28 RX ADMIN — IPRATROPIUM BROMIDE AND ALBUTEROL SULFATE 3 ML: .5; 3 SOLUTION RESPIRATORY (INHALATION) at 04:28

## 2021-03-28 RX ADMIN — IPRATROPIUM BROMIDE AND ALBUTEROL SULFATE 3 ML: .5; 3 SOLUTION RESPIRATORY (INHALATION) at 09:03

## 2021-03-28 RX ADMIN — ENOXAPARIN SODIUM 40 MG: 40 INJECTION SUBCUTANEOUS at 09:03

## 2021-03-28 RX ADMIN — AZITHROMYCIN DIHYDRATE 500 MG: 500 INJECTION, POWDER, LYOPHILIZED, FOR SOLUTION INTRAVENOUS at 02:51

## 2021-03-28 RX ADMIN — OXYCODONE AND ACETAMINOPHEN 1 TABLET: 5; 325 TABLET ORAL at 17:59

## 2021-03-28 RX ADMIN — IPRATROPIUM BROMIDE AND ALBUTEROL SULFATE 3 ML: .5; 3 SOLUTION RESPIRATORY (INHALATION) at 12:51

## 2021-03-28 RX ADMIN — Medication 10 ML: at 22:47

## 2021-03-28 RX ADMIN — BUDESONIDE 500 MCG: 0.5 SUSPENSION RESPIRATORY (INHALATION) at 08:45

## 2021-03-28 RX ADMIN — METHYLPREDNISOLONE SODIUM SUCCINATE 40 MG: 40 INJECTION, POWDER, FOR SOLUTION INTRAMUSCULAR; INTRAVENOUS at 12:52

## 2021-03-28 RX ADMIN — POTASSIUM CHLORIDE AND SODIUM CHLORIDE: 450; 150 INJECTION, SOLUTION INTRAVENOUS at 04:38

## 2021-03-28 NOTE — ED NOTES
Pt up to bedside commode, loose stool. Pt had been on a NC for oxygen due to her taking off the bi-pap.  While up felt increased shortness of breath and bi-pap placed back on and given breathing tx

## 2021-03-28 NOTE — ROUTINE PROCESS
1830: TRANSFER - IN REPORT: 
 
Verbal report received from Clarisa Nyhan, RN (name) on Otilio Grewal  being received from ED (unit) for routine progression of care Report consisted of patients Situation, Background, Assessment and  
Recommendations(SBAR). Information from the following report(s) SBAR, Kardex, ED Summary, Intake/Output, MAR, Recent Results, Cardiac Rhythm (SR/ST) and Alarm Parameters  was reviewed with the receiving nurse. Opportunity for questions and clarification was provided. Awaiting patient transport 1845: Patient arrived to unit. No fluids running at this time but ordered. Pharmacy messaged for new bag of .45% NS with 20 mEq KCl. Patient situated in bed. Vitals obtained and BP elevated at 196/84. Hydralazine 10mg IV given. Patient refusing blood sugar checks. Patient stating that the room is too hot and that she would like the door cracked. Explained to pt that this is a COVID floor and we cannot leave any doors open. Patient stated she would like to speak to the supervisor/charge nurse and that she would like to leave. Charge nurse notified. Went back to give medications to patient and she stated she would like to stay. Pt cooperative at this time. 1900: Bedside and Verbal shift change report given to Susie Quevedo RN (oncoming nurse) by Cortes Elena RN (offgoing nurse). Report included the following information SBAR, Kardex, Intake/Output, MAR, Recent Results, Cardiac Rhythm (SR/ST) and Alarm Parameters .

## 2021-03-28 NOTE — PROGRESS NOTES
Problem: Falls - Risk of  Goal: *Absence of Falls  Description: Document Onalee Gum Fall Risk and appropriate interventions in the flowsheet.   Outcome: Progressing Towards Goal  Note: Fall Risk Interventions:                                Problem: Patient Education: Go to Patient Education Activity  Goal: Patient/Family Education  Outcome: Progressing Towards Goal     Problem: Nutrition Deficit  Goal: *Optimize nutritional status  Outcome: Progressing Towards Goal     Problem: Patient Education: Go to Patient Education Activity  Goal: Patient/Family Education  Outcome: Progressing Towards Goal

## 2021-03-28 NOTE — PROGRESS NOTES
Pt removed bipap @ 0040 and refuses to put it back on because she said she needs to make a BM. I explained nurse was busy and will be there when she can so please wear it until then.  She still refuses

## 2021-03-28 NOTE — ED NOTES
Pt refusing finger stick. Pt states \"I am not a diabetic and you will not stick me\". Pt also refusing insulin.

## 2021-03-28 NOTE — PROGRESS NOTES
03/27/21 2147   Oxygen Therapy   O2 Sat (%) 100 %   Pulse via Oximetry 86 beats per minute   O2 Device BIPAP   CPAP/BIPAP   CPAP/BIPAP Start/Stop On   Device Mode BIPAP   $$ Bipap Daily Yes   Mask Type and Size Full face; Medium   Skin Condition intact   PIP Observed 18 cm H20   IPAP (cm H2O) 18 cm H2O   EPAP (cm H2O) 12 cm H2O   Inspiratory Time (sec) 0.9 seconds   Vt Spont (ml) 263 ml   Ve Observed (l/min) 3.4 l/min   Backup Rate 10   Total RR (Spontaneous) 15 breaths per minute   Insp Rise Time (sec) 3   Leak (Estimated) 11 L/min   Pt's Home Machine No   Biomedical Check Performed Yes   Settings Verified Yes

## 2021-03-28 NOTE — ED NOTES
Pt requesting pain medicine and food, restless in bed. Dr Berenice Hanks called and informed of such. Given pain medication for her rib/back pain and lunch tray. Currently on 4L NC. Pt will not keep bi-pap on.

## 2021-03-28 NOTE — PROGRESS NOTES
[facility]  [facility]    Patient: Angela Salvador Age: 63 y.o. : 1965 MR#: [id number] SSN: [id number]  Date: 3/28/2021     Subjective/24-hour events:     Chart reviewed. Patient admitted early this AM by night coverage. History of asthma/COPD with medical noncompliance and ongoing tobacco use. Well-known to service from multiple, frequent hospitalizations. Usual presentation overnight of acute respiratory distress requiring BiPAP. Known substance abuse history and UDS is once again positive for cocaine and opiates. Referred for admission for inpatient management of acute respiratory failure. Assessment:   COPD with acute exacerbation  Acute respiratory failure secondary to above  Hypertensive urgency  Tobacco use/active smoker  Substance abuse  Medical noncompliance    Plan:   BiPAP, wean as tolerated. Continue steroids and bronchodilators as ordered. Antihypertensive therapy, adjust as necessary.   Follow-up formally in AM.    Objective:   VS:   Visit Vitals  BP (!) 137/57   Pulse 99   Temp 98.1 °F (36.7 °C)   Resp 25   LMP 2009   SpO2 100%      Tmax/24hrs: Temp (24hrs), Av.1 °F (36.7 °C), Min:98.1 °F (36.7 °C), Max:98.1 °F (36.7 °C)  No intake or output data in the 24 hours ending 21 1026      Labs:    Recent Results (from the past 24 hour(s))   CULTURE, BLOOD    Collection Time: 21  9:30 PM    Specimen: Blood   Result Value Ref Range    Special Requests: NO SPECIAL REQUESTS      Culture result: NO GROWTH AFTER 9 HOURS     METABOLIC PANEL, COMPREHENSIVE    Collection Time: 21  9:30 PM   Result Value Ref Range    Sodium 140 136 - 145 mmol/L    Potassium 3.2 (L) 3.5 - 5.5 mmol/L    Chloride 104 100 - 111 mmol/L    CO2 33 (H) 21 - 32 mmol/L    Anion gap 3 3.0 - 18 mmol/L    Glucose 110 (H) 74 - 99 mg/dL    BUN 14 7.0 - 18 MG/DL    Creatinine 0.99 0.6 - 1.3 MG/DL    BUN/Creatinine ratio 14 12 - 20      GFR est AA >60 >60 ml/min/1.73m2    GFR est non-AA 58 (L) >60 ml/min/1.73m2    Calcium 8.5 8.5 - 10.1 MG/DL    Bilirubin, total 0.4 0.2 - 1.0 MG/DL    ALT (SGPT) 44 13 - 56 U/L    AST (SGOT) 57 (H) 10 - 38 U/L    Alk. phosphatase 116 45 - 117 U/L    Protein, total 7.5 6.4 - 8.2 g/dL    Albumin 3.8 3.4 - 5.0 g/dL    Globulin 3.7 2.0 - 4.0 g/dL    A-G Ratio 1.0 0.8 - 1.7     CBC WITH AUTOMATED DIFF    Collection Time: 03/27/21  9:30 PM   Result Value Ref Range    WBC 10.5 4.6 - 13.2 K/uL    RBC 4.37 4.20 - 5.30 M/uL    HGB 12.9 12.0 - 16.0 g/dL    HCT 40.7 35.0 - 45.0 %    MCV 93.1 74.0 - 97.0 FL    MCH 29.5 24.0 - 34.0 PG    MCHC 31.7 31.0 - 37.0 g/dL    RDW 15.0 (H) 11.6 - 14.5 %    PLATELET 317 380 - 729 K/uL    MPV 10.4 9.2 - 11.8 FL    NEUTROPHILS 74 (H) 40 - 73 %    LYMPHOCYTES 18 (L) 21 - 52 %    MONOCYTES 7 3 - 10 %    EOSINOPHILS 1 0 - 5 %    BASOPHILS 0 0 - 2 %    ABS. NEUTROPHILS 7.8 1.8 - 8.0 K/UL    ABS. LYMPHOCYTES 1.9 0.9 - 3.6 K/UL    ABS. MONOCYTES 0.7 0.05 - 1.2 K/UL    ABS. EOSINOPHILS 0.1 0.0 - 0.4 K/UL    ABS.  BASOPHILS 0.0 0.0 - 0.1 K/UL    DF AUTOMATED     CULTURE, BLOOD    Collection Time: 03/27/21  9:35 PM    Specimen: Blood   Result Value Ref Range    Special Requests: NO SPECIAL REQUESTS      Culture result: NO GROWTH AFTER 9 HOURS     POC LACTIC ACID    Collection Time: 03/27/21  9:40 PM   Result Value Ref Range    Lactic Acid (POC) 0.94 0.40 - 2.00 mmol/L   EKG, 12 LEAD, INITIAL    Collection Time: 03/27/21  9:53 PM   Result Value Ref Range    Ventricular Rate 80 BPM    Atrial Rate 80 BPM    P-R Interval 130 ms    QRS Duration 80 ms    Q-T Interval 360 ms    QTC Calculation (Bezet) 415 ms    Calculated P Axis 71 degrees    Calculated R Axis 69 degrees    Calculated T Axis 48 degrees    Diagnosis       Sinus rhythm with fusion complexes and premature atrial complexes with   aberrant conduction  Voltage criteria for left ventricular hypertrophy  Anterolateral infarct , age undetermined  Abnormal ECG  When compared with ECG of 24-MAR-2021 08:44,  fusion complexes are now present  aberrant conduction is now present  Left bundle branch block is no longer present  Anterior infarct is now present  Anterolateral infarct is now present     NT-PRO BNP    Collection Time: 03/27/21  9:55 PM   Result Value Ref Range    NT pro-BNP 2,684 (H) 0 - 900 PG/ML   CARDIAC PANEL,(CK, CKMB & TROPONIN)    Collection Time: 03/27/21  9:55 PM   Result Value Ref Range    CK - MB 5.2 (H) <3.6 ng/ml    CK-MB Index 5.4 (H) 0.0 - 4.0 %    CK 97 26 - 192 U/L    Troponin-I, QT 0.07 (H) 0.0 - 0.045 NG/ML   C REACTIVE PROTEIN, QT    Collection Time: 03/27/21  9:55 PM   Result Value Ref Range    C-Reactive protein 0.7 (H) 0 - 0.3 mg/dL   FERRITIN    Collection Time: 03/27/21  9:55 PM   Result Value Ref Range    Ferritin 36 8 - 388 NG/ML   LD    Collection Time: 03/27/21  9:55 PM   Result Value Ref Range     (H) 81 - 234 U/L   PROCALCITONIN    Collection Time: 03/27/21  9:55 PM   Result Value Ref Range    Procalcitonin <0.05 ng/mL   POC G3    Collection Time: 03/27/21 10:12 PM   Result Value Ref Range    Device: BIPAP      FIO2 (POC) 45 %    pH (POC) 7.26 (L) 7.35 - 7.45      pCO2 (POC) 70.9 (H) 35.0 - 45.0 MMHG    pO2 (POC) 147 (H) 80 - 100 MMHG    HCO3 (POC) 32.0 (H) 22 - 26 MMOL/L    sO2 (POC) 99 (H) 92 - 97 %    Base excess (POC) 3 mmol/L    PEEP/CPAP (POC) 12 cmH2O    PIP (POC) 18      Allens test (POC) YES      Site LEFT RADIAL      Specimen type (POC) ARTERIAL      Performed by [facility]    RESPIRATORY VIRUS PANEL W/COVID-19, PCR    Collection Time: 03/27/21 11:52 PM    Specimen: Nasopharyngeal   Result Value Ref Range    Adenovirus Not detected NOTD      Coronavirus 229E Not detected NOTD      Coronavirus HKU1 Not detected NOTD      Coronavirus CVNL63 Not detected NOTD      Coronavirus OC43 Not detected NOTD      Metapneumovirus Not detected NOTD      Rhinovirus and Enterovirus Not detected NOTD      Influenza A Not detected NOTD Influenza B Not detected NOTD      Parainfluenza 1 Not detected NOTD      Parainfluenza 2 Not detected NOTD      Parainfluenza 3 Not detected NOTD      Parainfluenza virus 4 Not detected NOTD      RSV by PCR Not detected NOTD      B. parapertussis, PCR Not detected NOTD      Bordetella pertussis - PCR Not detected NOTD      Chlamydophila pneumoniae DNA, QL, PCR Not detected NOTD      Mycoplasma pneumoniae DNA, QL, PCR Not detected NOTD      SARS-CoV-2, PCR Not detected NOTD     POC G3    Collection Time: 03/28/21 12:14 AM   Result Value Ref Range    Device: BIPAP      FIO2 (POC) 40 %    pH (POC) 7.33 (L) 7.35 - 7.45      pCO2 (POC) 57.4 (H) 35.0 - 45.0 MMHG    pO2 (POC) 153 (H) 80 - 100 MMHG    HCO3 (POC) 30.3 (H) 22 - 26 MMOL/L    sO2 (POC) 99 (H) 92 - 97 %    Base excess (POC) 3 mmol/L    PEEP/CPAP (POC) 12 cmH2O    PIP (POC) 18      Allens test (POC) YES      Site LEFT BRACHIAL      Specimen type (POC) ARTERIAL      Performed by Yony Marie    URINALYSIS W/ RFLX MICROSCOPIC    Collection Time: 03/28/21  7:05 AM   Result Value Ref Range    Color YELLOW      Appearance CLEAR      Specific gravity 1.011 1.005 - 1.030      pH (UA) 6.0 5.0 - 8.0      Protein TRACE (A) NEG mg/dL    Glucose 100 (A) NEG mg/dL    Ketone Negative NEG mg/dL    Bilirubin Negative NEG      Blood TRACE (A) NEG      Urobilinogen 0.2 0.2 - 1.0 EU/dL    Nitrites Negative NEG      Leukocyte Esterase SMALL (A) NEG     DRUG SCREEN, URINE    Collection Time: 03/28/21  7:05 AM   Result Value Ref Range    BENZODIAZEPINES Negative NEG      BARBITURATES Negative NEG      THC (TH-CANNABINOL) Negative NEG      OPIATES Positive (A) NEG      PCP(PHENCYCLIDINE) Negative NEG      COCAINE Positive (A) NEG      AMPHETAMINES Negative NEG      METHADONE Negative NEG      HDSCOM (NOTE)    CARDIAC PANEL,(CK, CKMB & TROPONIN)    Collection Time: 03/28/21  7:05 AM   Result Value Ref Range    CK - MB 4.0 (H) <3.6 ng/ml    CK-MB Index 7.1 (H) 0.0 - 4.0 %    CK 56 26 - 192 U/L    Troponin-I, QT 0.05 (H) 0.0 - 0.045 NG/ML   PROTHROMBIN TIME + INR    Collection Time: 03/28/21  7:05 AM   Result Value Ref Range    Prothrombin time 13.9 11.5 - 15.2 sec    INR 1.1 0.8 - 1.2     URINE MICROSCOPIC ONLY    Collection Time: 03/28/21  7:05 AM   Result Value Ref Range    WBC 1 to 4 0 - 4 /hpf    RBC 0 to 2 0 - 5 /hpf    Epithelial cells FEW 0 - 5 /lpf    Bacteria FEW (A) NEG /hpf       Signed By: Chelsy Pineda MD     March 28, 2021

## 2021-03-28 NOTE — ED PROVIDER NOTES
EMERGENCY DEPARTMENT HISTORY AND PHYSICAL EXAM    9:36 PM      Date: 3/27/2021  Patient Name: Inez Reddy    History of Presenting Illness     Chief Complaint   Patient presents with    Respiratory Distress         History Provided By: Patient    Additional History (Context): Inez Reddy is a 63 y.o. female with Past medical history of CHF, COPD, cocaine abuse, hypertension, GERD, fibromyalgia, respiratory failure with hypoxia who presents with chief complaint per EMS of respiratory distress. They arrived to scene patient's O2 sat was 82% on room air. Emergently placed her on CPAP and gave 1 DuoNeb. On arrival patient in respiratory distress. Has been intubated in the past.  Patient states her head yes and no to questions. Nods yes to shortness of breath and nods yes to the chest pain. Nods no to fever. Otherwise HPI very limited.     PCP: Preston Vazquez MD        Past History     Past Medical History:  Past Medical History:   Diagnosis Date    CHF (congestive heart failure) ([address]     Chronic respiratory failure with hypoxia ([address] 9/7/2020    CKD (chronic kidney disease) stage 3, GFR 30-59 ml/min 10/31/2019    Cocaine abuse ([address] 11/26/2017    COPD (chronic obstructive pulmonary disease) with chronic bronchitis ([address] 12/19/2017    Dependence on supplemental oxygen     Depression 3/22/2020    Drug-seeking behavior 11/19/2016    Endocrine disease     thyroid issues    Essential hypertension 3/10/2017    Fe deficiency anemia 10/27/2012    Fibromyalgia 12/16/2016    Gastroesophageal reflux disease without esophagitis 10/10/2016    Gastrointestinal disorder     \"blockage in my stomach\"    Hypercholesterolemia     Hypertension     Hypertensive heart failure ([address] 12/19/2017    Morbid obesity due to excess calories ([address] 3/10/2017    NSTEMI (non-ST elevated myocardial infarction) ([address] 3/22/2020    On home O2 12/3/2015    Overview:  2L at home per pt for approx 8 years    Polysubstance abuse ([facility] 9/15/2018    Respiratory failure ([facility] 1/11/2017    S/P hernia repair 10/31/2019    Sleep apnea 12/19/2017    T wave inversion in EKG 3/9/2019    Tobacco use 1/95/7618    Uncomplicated severe persistent asthma 12/13/2016    Vocal cord disease 12/7/2016       Past Surgical History:  Past Surgical History:   Procedure Laterality Date    HX GI      Exploratory laparotomy with lysis of adhesions and primary repair of incarcerated umbilical hernia       Family History:  History reviewed. No pertinent family history. Social History:  Social History     Tobacco Use    Smoking status: Former Smoker     Packs/day: 0.25    Smokeless tobacco: Current User    Tobacco comment: Pt states she has not had money to buy cigarettes in the past month   Substance Use Topics    Alcohol use: No     Comment: socially    Drug use: Not Currently     Types: Heroin     Comment: pt reports using approximately a month ago       Allergies:  No Known Allergies      Review of Systems       Review of Systems   Unable to perform ROS: Acuity of condition   Respiratory: Positive for cough, chest tightness and shortness of breath. Cardiovascular: Positive for chest pain. Gastrointestinal: Positive for abdominal pain. Physical Exam     Visit Vitals  BP (!) 201/64   Pulse 86   Temp 98.1 °F (36.7 °C)   Resp 15   LMP 04/14/2009   SpO2 98%         Physical Exam  Vitals signs and nursing note reviewed. Constitutional:       General: She is not in acute distress. Appearance: She is well-developed. She is ill-appearing. She is not toxic-appearing or diaphoretic. HENT:      Head: Normocephalic and atraumatic. Eyes:      General: No scleral icterus. Conjunctiva/sclera: Conjunctivae normal.      Pupils: Pupils are equal, round, and reactive to light. Neck:      Musculoskeletal: Normal range of motion and neck supple. Cardiovascular:      Rate and Rhythm: Normal rate. Heart sounds:  No murmur. No gallop. Comments: Capillary refill < 3 seconds  Pulmonary:      Effort: Respiratory distress present. Breath sounds: No stridor. Wheezing present. No rales. Comments: Very tight sounding lungs  Abdominal:      General: Bowel sounds are normal. There is no distension. Palpations: Abdomen is soft. Tenderness: There is no abdominal tenderness. Musculoskeletal: Normal range of motion. General: No tenderness. Right lower leg: No edema. Left lower leg: No edema. Lymphadenopathy:      Cervical: No cervical adenopathy. Skin:     General: Skin is warm and dry. Coloration: Skin is not pale. Neurological:      Mental Status: She is alert and oriented to person, place, and time. Cranial Nerves: No cranial nerve deficit. Sensory: No sensory deficit. Motor: No weakness. Comments: Equal strength bilateral upper extremities  I mentioned possibility of intubating patient and she immediately grabbed me tightly with both her hands and pulled me shaking her head no multiple times           Diagnostic Study Results     Labs -  Recent Results (from the past 12 hour(s))   METABOLIC PANEL, COMPREHENSIVE    Collection Time: 03/27/21  9:30 PM   Result Value Ref Range    Sodium 140 136 - 145 mmol/L    Potassium 3.2 (L) 3.5 - 5.5 mmol/L    Chloride 104 100 - 111 mmol/L    CO2 33 (H) 21 - 32 mmol/L    Anion gap 3 3.0 - 18 mmol/L    Glucose 110 (H) 74 - 99 mg/dL    BUN 14 7.0 - 18 MG/DL    Creatinine 0.99 0.6 - 1.3 MG/DL    BUN/Creatinine ratio 14 12 - 20      GFR est AA >60 >60 ml/min/1.73m2    GFR est non-AA 58 (L) >60 ml/min/1.73m2    Calcium 8.5 8.5 - 10.1 MG/DL    Bilirubin, total 0.4 0.2 - 1.0 MG/DL    ALT (SGPT) 44 13 - 56 U/L    AST (SGOT) 57 (H) 10 - 38 U/L    Alk.  phosphatase 116 45 - 117 U/L    Protein, total 7.5 6.4 - 8.2 g/dL    Albumin 3.8 3.4 - 5.0 g/dL    Globulin 3.7 2.0 - 4.0 g/dL    A-G Ratio 1.0 0.8 - 1.7     CBC WITH AUTOMATED DIFF Collection Time: 03/27/21  9:30 PM   Result Value Ref Range    WBC 10.5 4.6 - 13.2 K/uL    RBC 4.37 4.20 - 5.30 M/uL    HGB 12.9 12.0 - 16.0 g/dL    HCT 40.7 35.0 - 45.0 %    MCV 93.1 74.0 - 97.0 FL    MCH 29.5 24.0 - 34.0 PG    MCHC 31.7 31.0 - 37.0 g/dL    RDW 15.0 (H) 11.6 - 14.5 %    PLATELET 095 277 - 699 K/uL    MPV 10.4 9.2 - 11.8 FL    NEUTROPHILS 74 (H) 40 - 73 %    LYMPHOCYTES 18 (L) 21 - 52 %    MONOCYTES 7 3 - 10 %    EOSINOPHILS 1 0 - 5 %    BASOPHILS 0 0 - 2 %    ABS. NEUTROPHILS 7.8 1.8 - 8.0 K/UL    ABS. LYMPHOCYTES 1.9 0.9 - 3.6 K/UL    ABS. MONOCYTES 0.7 0.05 - 1.2 K/UL    ABS. EOSINOPHILS 0.1 0.0 - 0.4 K/UL    ABS.  BASOPHILS 0.0 0.0 - 0.1 K/UL    DF AUTOMATED     POC LACTIC ACID    Collection Time: 03/27/21  9:40 PM   Result Value Ref Range    Lactic Acid (POC) 0.94 0.40 - 2.00 mmol/L   EKG, 12 LEAD, INITIAL    Collection Time: 03/27/21  9:53 PM   Result Value Ref Range    Ventricular Rate 80 BPM    Atrial Rate 80 BPM    P-R Interval 130 ms    QRS Duration 80 ms    Q-T Interval 360 ms    QTC Calculation (Bezet) 415 ms    Calculated P Axis 71 degrees    Calculated R Axis 69 degrees    Calculated T Axis 48 degrees    Diagnosis       Sinus rhythm with fusion complexes and premature atrial complexes with   aberrant conduction  Voltage criteria for left ventricular hypertrophy  Anterolateral infarct , age undetermined  Abnormal ECG  When compared with ECG of 24-MAR-2021 08:44,  fusion complexes are now present  aberrant conduction is now present  Left bundle branch block is no longer present  Anterior infarct is now present  Anterolateral infarct is now present     NT-PRO BNP    Collection Time: 03/27/21  9:55 PM   Result Value Ref Range    NT pro-BNP 2,684 (H) 0 - 900 PG/ML   CARDIAC PANEL,(CK, CKMB & TROPONIN)    Collection Time: 03/27/21  9:55 PM   Result Value Ref Range    CK - MB 5.2 (H) <3.6 ng/ml    CK-MB Index 5.4 (H) 0.0 - 4.0 %    CK 97 26 - 192 U/L    Troponin-I, QT 0.07 (H) 0.0 - 0.045 NG/ML   C REACTIVE PROTEIN, QT    Collection Time: 03/27/21  9:55 PM   Result Value Ref Range    C-Reactive protein 0.7 (H) 0 - 0.3 mg/dL   FERRITIN    Collection Time: 03/27/21  9:55 PM   Result Value Ref Range    Ferritin 36 8 - 388 NG/ML   LD    Collection Time: 03/27/21  9:55 PM   Result Value Ref Range     (H) 81 - 234 U/L   PROCALCITONIN    Collection Time: 03/27/21  9:55 PM   Result Value Ref Range    Procalcitonin <0.05 ng/mL   POC G3    Collection Time: 03/27/21 10:12 PM   Result Value Ref Range    Device: BIPAP      FIO2 (POC) 45 %    pH (POC) 7.26 (L) 7.35 - 7.45      pCO2 (POC) 70.9 (H) 35.0 - 45.0 MMHG    pO2 (POC) 147 (H) 80 - 100 MMHG    HCO3 (POC) 32.0 (H) 22 - 26 MMOL/L    sO2 (POC) 99 (H) 92 - 97 %    Base excess (POC) 3 mmol/L    PEEP/CPAP (POC) 12 cmH2O    PIP (POC) 18      Allens test (POC) YES      Site LEFT RADIAL      Specimen type (POC) ARTERIAL      Performed by Rosibel Hollins    POC G3    Collection Time: 03/28/21 12:14 AM   Result Value Ref Range    Device: BIPAP      FIO2 (POC) 40 %    pH (POC) 7.33 (L) 7.35 - 7.45      pCO2 (POC) 57.4 (H) 35.0 - 45.0 MMHG    pO2 (POC) 153 (H) 80 - 100 MMHG    HCO3 (POC) 30.3 (H) 22 - 26 MMOL/L    sO2 (POC) 99 (H) 92 - 97 %    Base excess (POC) 3 mmol/L    PEEP/CPAP (POC) 12 cmH2O    PIP (POC) 18      Allens test (POC) YES      Site LEFT BRACHIAL      Specimen type (POC) ARTERIAL      Performed by Rosibel Hollins        Radiologic Studies -   XR CHEST PORT    (Results Pending)   Per my preliminary read: Perhaps mild pulmonary congestion but improved from previous chest x-ray, no other acute process  Dr. Derek Enriquez   I am the first provider for this patient. I reviewed the vital signs, available nursing notes, past medical history, past surgical history, family history and social history. Vital Signs-Reviewed the patient's vital signs.     Pulse Oximetry Analysis -100% on CPAP (Interpretation) normal    Cardiac Monitor:  Rate: 89  Rhythm: Sinus Rhythm     EKG: Interpreted by the EP Dr. Vicente Peterson. Time Interpreted: 2153   Rate: 80   Rhythm: Sinus rhythm   Interpretation: Normal QRS duration, normal QTC, a few PACs       Records Reviewed: Nursing Notes and Old Medical Records (Time of Review: 9:36 PM)    Provider Notes (Medical Decision Making): DDx: Acute COPD exacerbation, CHF, pneumonia, other infection, TMAOR-82, metabolic, history of cocaine abuse    I employed PPE during examination    Switch patient emergently to BiPAP, give serial duo nebs, give terbutaline, give epinephrine  Considered intubation with patient adamantly refusing, pulling me strongly with both of her hands and shaking her head no. MDM    Medications   sodium chloride (NS) flush 5-10 mL (has no administration in time range)   albuterol-ipratropium (DUO-NEB) 2.5 mg-0.5 mg/3 ml nebulizer solution (9 mL  Given 3/27/21 2158)   EPINEPHrine HCl (PF) (ADRENALIN) 1 mg/mL (1 mL) injection (0.3 mg  Given 3/27/21 2157)   magnesium sulfate 2 g/50 ml 2 gram/50 mL (4 %) IVPB premix pgbk (  IV Completed 3/27/21 2339)         ED Course: Progress Notes, Reevaluation, and Consults:    Patient feeling much better now talking. abg improving was 7.26/71/147/32, now 7.33/57.4/153/30    Consult:  Discussed care with Dr. Stormy Leigh, Specialty: Hospitalist, standard discussion; including history of patients chief complaint, available diagnostic results, and treatment course. He accepts admission to stepdown  12:29 AM, 3/27/2021     Critical care time:   I have spent 50 minutes of critical care time involved in lab review, consultations with specialist, decision making, and documentation. During this entire length of time I was immediately available to the patient. I treated patient emergently for acute severe copd exacerbation with multiple meds including duoneb, epi, terbutaline, and placed on bipap    Critical care:  The reason for providing this level of medical care for this critically ill patient was due to a critical illness that impaired one or more vital organ systems such that there was a high probability of imminent or life threatening deterioration in the patients condition. This care involved high complexity decision making to assess, manipulate and support vital system functions, to treat this degree vital organ system failure and to prevent further life threatening deterioration of the patient's condition. I discussed diagnoses, results and plan with pt who agrees with admit here        Diagnosis     Clinical Impression:   1. Acute exacerbation of chronic obstructive pulmonary disease (COPD) ([address]    2. BiPAP (biphasic positive airway pressure) dependence    3. Acute respiratory failure with hypoxia and hypercapnia (HCC)        Disposition: admitted to stepdown    Follow-up Information    None          Patient's Medications   Start Taking    No medications on file   Continue Taking    ALBUTEROL (PROVENTIL VENTOLIN) 2.5 MG /3 ML (0.083 %) NEBU    1 nebulizer treatment every 4 hours while awake for 1 week then every 4 hours as needed for shortness of breath    ASPIRIN DELAYED-RELEASE 81 MG TABLET    Take 1 Tab by mouth daily. ATORVASTATIN (LIPITOR) 20 MG TABLET    Take 1 Tab by mouth nightly. CITALOPRAM (CELEXA) 20 MG TABLET    Take 1 Tab by mouth every morning. CLONIDINE HCL (CATAPRES) 0.2 MG TABLET    Take 1 Tab by mouth every twelve (12) hours. FAMOTIDINE (PEPCID) 20 MG TABLET    Take 1 Tab by mouth nightly. FLUTICASONE FUROATE-VILANTEROL (BREO ELLIPTA) 200-25 MCG/DOSE INHALER    Take 1 Puff by inhalation daily. Indications: controller medication for asthma    LOSARTAN (COZAAR) 25 MG TABLET    Take 1 Tab by mouth daily. Indications: high blood pressure    MONTELUKAST (SINGULAIR) 10 MG TABLET    Take 1 Tab by mouth nightly.  Indications: controller medication for asthma    NALOXONE (NARCAN) 4 MG/ACTUATION NASAL SPRAY    Use 1 spray intranasally, then discard. Repeat with new spray every 2 min as needed for opioid overdose symptoms, alternating nostrils. NEBULIZER & COMPRESSOR MACHINE    1 Each by Does Not Apply route as needed for Wheezing or Shortness of Breath. NITROGLYCERIN (NITROSTAT) 0.4 MG SL TABLET    Take 1 Tab by mouth every five (5) minutes as needed for Chest Pain. Sit/Lay down then put one tab under the tongue every 5 minutes as needed for chest pain for 3 doses    OXYGEN-AIR DELIVERY SYSTEMS    3 L by IntraNASal route as needed for Other (sob). PREDNISONE (DELTASONE) 50 MG TABLET    Take 1 Tab by mouth daily for 5 days. ROFLUMILAST (DALIRESP) 500 MCG TAB TABLET    Take 1 Tab by mouth daily. These Medications have changed    No medications on file   Stop Taking    No medications on file         DO Blaine Clarke medical dictation software was used for portions of this report. Unintended transcription errors may occur. My signature above authenticates this document and my orders, the final    diagnosis (es), discharge prescription (s), and instructions in the Epic    record.

## 2021-03-28 NOTE — ED NOTES
Report received from [facility] off going shift. Pt laying in bed quietly. Sp02 98% on 3L NC.  Pt A&Ox4

## 2021-03-28 NOTE — H&P
Hospitalist Admission History and Physical    NAME:  Jovanny Baker   :   1965   MRN:   [id number]     PCP:  Terra Sofia MD  Date/Time:  3/28/2021   Subjective:   CHIEF COMPLAINT: Respiratory distress and shortness of breath    HISTORY OF PRESENT ILLNESS:       This is a 65-year-old female with past medical history of COPD and hypertension and substance abuse who was brought to the ED by paramedics after she was noted to have respiratory distress. In the ED patient was placed on BiPAP. Patient has shown some improvement in the ABGs have also shown improvement. When I saw the patient patient had removed her BiPAP. I discussed with the nursing staff. I counseled the patient to resume BiPAP. No history of any chest pain or palpitations. No history of any fever or chills. Patient states that the shortness of breath started yesterday and has gotten worse. Patient states that she is feeling better since she came to the ED. Patient is a poor historian and does not give much details. No history of any abdominal pain urinary complaints diarrhea or rectal bleeding. No history of any rash. No history of any headaches.       Past Medical History:   Diagnosis Date    CHF (congestive heart failure) (HCC)     Chronic respiratory failure with hypoxia ([address] 2020    CKD (chronic kidney disease) stage 3, GFR 30-59 ml/min 10/31/2019    Cocaine abuse ([address] 2017    COPD (chronic obstructive pulmonary disease) with chronic bronchitis ([address] 2017    Dependence on supplemental oxygen     Depression 3/22/2020    Drug-seeking behavior 2016    Endocrine disease     thyroid issues    Essential hypertension 3/10/2017    Fe deficiency anemia 10/27/2012    Fibromyalgia 2016    Gastroesophageal reflux disease without esophagitis 10/10/2016    Gastrointestinal disorder     \"blockage in my stomach\"    Hypercholesterolemia     Hypertension     Hypertensive heart failure ([facility] 2017    Morbid obesity due to excess calories ([facility] 3/10/2017    NSTEMI (non-ST elevated myocardial infarction) ([facility] 3/22/2020    On home O2 12/3/2015    Overview:  2L at home per pt for approx 8 years    Polysubstance abuse ([facility] 9/15/2018    Respiratory failure ([facility] 2017    S/P hernia repair 10/31/2019    Sleep apnea 2017    T wave inversion in EKG 3/9/2019    Tobacco use     Uncomplicated severe persistent asthma 2016    Vocal cord disease 2016        Past Surgical History:   Procedure Laterality Date    HX GI      Exploratory laparotomy with lysis of adhesions and primary repair of incarcerated umbilical hernia       Social History     Tobacco Use    Smoking status: Former Smoker     Packs/day: 0.25    Smokeless tobacco: Current User    Tobacco comment: Pt states she has not had money to buy cigarettes in the past month   Substance Use Topics    Alcohol use: No     Comment: socially        Family historypatient does not provide any details regarding her family's medical history. No Known Allergies     Prior to Admission Medications   Prescriptions Last Dose Informant Patient Reported? Taking? Nebulizer & Compressor machine   No No   Si Each by Does Not Apply route as needed for Wheezing or Shortness of Breath. OXYGEN-AIR DELIVERY SYSTEMS   Yes No   Sig: 3 L by IntraNASal route as needed for Other (sob). albuterol (PROVENTIL VENTOLIN) 2.5 mg /3 mL (0.083 %) nebu   No No   Si nebulizer treatment every 4 hours while awake for 1 week then every 4 hours as needed for shortness of breath   aspirin delayed-release 81 mg tablet   No No   Sig: Take 1 Tab by mouth daily. atorvastatin (LIPITOR) 20 mg tablet   No No   Sig: Take 1 Tab by mouth nightly. citalopram (CeleXA) 20 mg tablet   No No   Sig: Take 1 Tab by mouth every morning. cloNIDine HCL (CATAPRES) 0.2 mg tablet   No No   Sig: Take 1 Tab by mouth every twelve (12) hours. famotidine (PEPCID) 20 mg tablet   No No   Sig: Take 1 Tab by mouth nightly. fluticasone furoate-vilanteroL (Breo Ellipta) 200-25 mcg/dose inhaler   No No   Sig: Take 1 Puff by inhalation daily. Indications: controller medication for asthma   losartan (COZAAR) 25 mg tablet   No No   Sig: Take 1 Tab by mouth daily. Indications: high blood pressure   montelukast (SINGULAIR) 10 mg tablet   No No   Sig: Take 1 Tab by mouth nightly. Indications: controller medication for asthma   naloxone (NARCAN) 4 mg/actuation nasal spray   No No   Sig: Use 1 spray intranasally, then discard. Repeat with new spray every 2 min as needed for opioid overdose symptoms, alternating nostrils. nitroglycerin (NITROSTAT) 0.4 mg SL tablet   No No   Sig: Take 1 Tab by mouth every five (5) minutes as needed for Chest Pain. Sit/Lay down then put one tab under the tongue every 5 minutes as needed for chest pain for 3 doses   predniSONE (DELTASONE) 50 mg tablet   No No   Sig: Take 1 Tab by mouth daily for 5 days. roflumilast (DALIRESP) 500 mcg tab tablet   No No   Sig: Take 1 Tab by mouth daily. Facility-Administered Medications: None       REVIEW OF SYSTEMS:    Review of Systems  GENERAL: Patient is awake and follows simple commands  HEENT: No change in vision, no earache, tinnitus, sore throat or sinus congestion. NECK: No pain or stiffness. PULMONARY: Has shortness of breath and cough and wheezing  Cardiovascular: no pnd or orthopnea, no CP  GASTROINTESTINAL: No abdominal pain, nausea, vomiting or diarrhea, melena or bright red blood per rectum. GENITOURINARY: No urinary frequency, urgency, hesitancy or dysuria. MUSCULOSKELETAL: No back pain, no leg pain  DERMATOLOGIC: No rash, no itching, no lesions. ENDOCRINE: No polyuria, polydipsia, no heat or cold intolerance. No recent change in weight. HEMATOLOGICAL: No anemia or easy bruising or bleeding. NEUROLOGIC: No headache, seizures, numbness, tingling or weakness. Objective:   VITALS:    Visit Vitals  BP (!) 201/64   Pulse 86   Temp 98.1 °F (36.7 °C)   Resp 15   LMP 2009   SpO2 98%     Temp (24hrs), Av.1 °F (36.7 °C), Min:98.1 °F (36.7 °C), Max:98.1 °F (36.7 °C)      PHYSICAL EXAM:   General:    Lying in bed in no acute distress. HEENT:  Pupils equal.  Sclera anicteric. Conjunctiva pink. Mucous membranes                           Moist, no ear or nasal discharge  Neck:  Supple. Trachea midline. No accessory muscle use. No thyromegaly. No jugular venous distention, no carotid bruit  CV:                  Regular rate and rhythm. S1S2+  Lungs:   Bilateral wheezing, no Rales, no rhonchi  Abdomen:   Soft, non-tender. Not distended. Bowel sounds normal.   Extremities: No cyanosis. No edema. Pulses 1+ b/l  Neurologic: Awake, follows simple commands, patient is able to move all 4 extremities  Skin:                Warm and dry. No rashes. LAB DATA REVIEWED:    No components found for: [place]  Recent Labs     21  2130      K 3.2*      CO2 33*   BUN 14   CREA 0.99   *   CA 8.5   ALB 3.8   WBC 10.5   HGB 12.9   HCT 40.7            CBC WITH AUTOMATED DIFF    Collection Time: 21  9:30 PM   Result Value Ref Range    WBC 10.5 4.6 - 13.2 K/uL    RBC 4.37 4.20 - 5.30 M/uL    HGB 12.9 12.0 - 16.0 g/dL    HCT 40.7 35.0 - 45.0 %    MCV 93.1 74.0 - 97.0 FL    MCH 29.5 24.0 - 34.0 PG    MCHC 31.7 31.0 - 37.0 g/dL    RDW 15.0 (H) 11.6 - 14.5 %    PLATELET 999 272 - 425 K/uL    MPV 10.4 9.2 - 11.8 FL    NEUTROPHILS 74 (H) 40 - 73 %    LYMPHOCYTES 18 (L) 21 - 52 %    MONOCYTES 7 3 - 10 %    EOSINOPHILS 1 0 - 5 %    BASOPHILS 0 0 - 2 %    ABS. NEUTROPHILS 7.8 1.8 - 8.0 K/UL    ABS. LYMPHOCYTES 1.9 0.9 - 3.6 K/UL    ABS. MONOCYTES 0.7 0.05 - 1.2 K/UL    ABS. EOSINOPHILS 0.1 0.0 - 0.4 K/UL    ABS.  BASOPHILS 0.0 0.0 - 0.1 K/UL    DF AUTOMATED           Assessment/Plan:      COPD exacerbation  Respiratory distress  Hypertensive urgency  Substance abuse   Noncompliance  Tobacco abuse  ___________________________________________________  PLAN:    Continue steroids and bronchodilators. Continue Brovana and Pulmicort. Continue BiPAP. Please consult pulmonary in the morning. Bronchial hygiene  Catapres patch and as needed hydralazine  Smoking cessation education  Check a urine drug screen  Rest depending on patient's further hospital course. Plan of care was discussed with the patient.         Prophylaxis:  [x]Lovenox  []Coumadin  []Hep SQ  []SCDs  [x]H2B/PPI      Discussed Code Status:    [x]Full Code      []DNR     ___________________________________________________    Care Plan discussed with:    [x]Patient   []Family    []ED Care Manager  [x]ED Doc   []Specialist :    Total Time Coordinating Admission:  70    minutes    []Total Critical Care Time:     ___________________________________________________  Admitting Physician: Adrian Landin MD

## 2021-03-28 NOTE — ED NOTES
Pt refusing to put bipap back on. Pt refused. Pt also pulling of monitors. This RN advised pt of safety concerns. Pt continues to refuse bipap.

## 2021-03-29 VITALS
TEMPERATURE: 97.7 F | BODY MASS INDEX: 33.93 KG/M2 | OXYGEN SATURATION: 100 % | DIASTOLIC BLOOD PRESSURE: 81 MMHG | WEIGHT: 167.99 LBS | SYSTOLIC BLOOD PRESSURE: 187 MMHG | HEART RATE: 88 BPM | RESPIRATION RATE: 19 BRPM

## 2021-03-29 LAB
ANION GAP SERPL CALC-SCNC: 4 MMOL/L (ref 3–18)
ATRIAL RATE: 109 BPM
BASOPHILS # BLD: 0 K/UL (ref 0–0.1)
BASOPHILS NFR BLD: 0 % (ref 0–2)
BUN SERPL-MCNC: 19 MG/DL (ref 7–18)
BUN/CREAT SERPL: 19 (ref 12–20)
CALCIUM SERPL-MCNC: 8.2 MG/DL (ref 8.5–10.1)
CALCULATED P AXIS, ECG09: 76 DEGREES
CALCULATED R AXIS, ECG10: 35 DEGREES
CALCULATED T AXIS, ECG11: 100 DEGREES
CHLORIDE SERPL-SCNC: 109 MMOL/L (ref 100–111)
CO2 SERPL-SCNC: 30 MMOL/L (ref 21–32)
CREAT SERPL-MCNC: 1.01 MG/DL (ref 0.6–1.3)
DIAGNOSIS, 93000: NORMAL
DIFFERENTIAL METHOD BLD: ABNORMAL
EOSINOPHIL # BLD: 0 K/UL (ref 0–0.4)
EOSINOPHIL NFR BLD: 0 % (ref 0–5)
ERYTHROCYTE [DISTWIDTH] IN BLOOD BY AUTOMATED COUNT: 14.7 % (ref 11.6–14.5)
EST. AVERAGE GLUCOSE BLD GHB EST-MCNC: 105 MG/DL
GLUCOSE SERPL-MCNC: 138 MG/DL (ref 74–99)
HBA1C MFR BLD: 5.3 % (ref 4.2–5.6)
HCT VFR BLD AUTO: 35.9 % (ref 35–45)
HGB BLD-MCNC: 11.2 G/DL (ref 12–16)
LYMPHOCYTES # BLD: 0.6 K/UL (ref 0.9–3.6)
LYMPHOCYTES NFR BLD: 8 % (ref 21–52)
MAGNESIUM SERPL-MCNC: 2.1 MG/DL (ref 1.6–2.6)
MCH RBC QN AUTO: 28.4 PG (ref 24–34)
MCHC RBC AUTO-ENTMCNC: 31.2 G/DL (ref 31–37)
MCV RBC AUTO: 91.1 FL (ref 74–97)
MONOCYTES # BLD: 0.1 K/UL (ref 0.05–1.2)
MONOCYTES NFR BLD: 2 % (ref 3–10)
NEUTS SEG # BLD: 6.7 K/UL (ref 1.8–8)
NEUTS SEG NFR BLD: 90 % (ref 40–73)
P-R INTERVAL, ECG05: 124 MS
PLATELET # BLD AUTO: 251 K/UL (ref 135–420)
PMV BLD AUTO: 11 FL (ref 9.2–11.8)
POTASSIUM SERPL-SCNC: 3.5 MMOL/L (ref 3.5–5.5)
Q-T INTERVAL, ECG07: 316 MS
QRS DURATION, ECG06: 72 MS
QTC CALCULATION (BEZET), ECG08: 425 MS
RBC # BLD AUTO: 3.94 M/UL (ref 4.2–5.3)
SODIUM SERPL-SCNC: 143 MMOL/L (ref 136–145)
VENTRICULAR RATE, ECG03: 109 BPM
WBC # BLD AUTO: 7.4 K/UL (ref 4.6–13.2)

## 2021-03-29 PROCEDURE — 74011250637 HC RX REV CODE- 250/637: Performed by: FAMILY MEDICINE

## 2021-03-29 PROCEDURE — 74011250636 HC RX REV CODE- 250/636: Performed by: EMERGENCY MEDICINE

## 2021-03-29 PROCEDURE — 96372 THER/PROPH/DIAG INJ SC/IM: CPT

## 2021-03-29 PROCEDURE — 94660 CPAP INITIATION&MGMT: CPT

## 2021-03-29 PROCEDURE — 83036 HEMOGLOBIN GLYCOSYLATED A1C: CPT

## 2021-03-29 PROCEDURE — 99238 HOSP IP/OBS DSCHRG MGMT 30/<: CPT | Performed by: FAMILY MEDICINE

## 2021-03-29 PROCEDURE — 80048 BASIC METABOLIC PNL TOTAL CA: CPT

## 2021-03-29 PROCEDURE — 94640 AIRWAY INHALATION TREATMENT: CPT

## 2021-03-29 PROCEDURE — 74011000250 HC RX REV CODE- 250: Performed by: EMERGENCY MEDICINE

## 2021-03-29 PROCEDURE — 36415 COLL VENOUS BLD VENIPUNCTURE: CPT

## 2021-03-29 PROCEDURE — 99218 HC RM OBSERVATION: CPT

## 2021-03-29 PROCEDURE — 83735 ASSAY OF MAGNESIUM: CPT

## 2021-03-29 PROCEDURE — 85025 COMPLETE CBC W/AUTO DIFF WBC: CPT

## 2021-03-29 RX ADMIN — IPRATROPIUM BROMIDE AND ALBUTEROL SULFATE 3 ML: .5; 3 SOLUTION RESPIRATORY (INHALATION) at 08:00

## 2021-03-29 RX ADMIN — OXYCODONE AND ACETAMINOPHEN 1 TABLET: 5; 325 TABLET ORAL at 03:52

## 2021-03-29 RX ADMIN — IPRATROPIUM BROMIDE AND ALBUTEROL SULFATE 3 ML: .5; 3 SOLUTION RESPIRATORY (INHALATION) at 12:58

## 2021-03-29 RX ADMIN — OXYCODONE AND ACETAMINOPHEN 1 TABLET: 5; 325 TABLET ORAL at 09:07

## 2021-03-29 RX ADMIN — ENOXAPARIN SODIUM 40 MG: 40 INJECTION SUBCUTANEOUS at 12:58

## 2021-03-29 RX ADMIN — ARFORMOTEROL TARTRATE 15 MCG: 15 SOLUTION RESPIRATORY (INHALATION) at 08:00

## 2021-03-29 RX ADMIN — BUDESONIDE 500 MCG: 0.5 SUSPENSION RESPIRATORY (INHALATION) at 08:00

## 2021-03-29 NOTE — PROGRESS NOTES
1915: Assumed patient care from [facility] RN. Patient is alert and oriented to person, place, time and situation. Respiratory status is stable on 2L via nasal cannula. Vital signs are stable. MEWS score is a four. Patient denies any pain, discomfort, nausea vomiting dizziness or anxiety. White board and fall card is updated. Bed is locked and in lowest position. Call bell, water and personal belongings are within reach. Patient has no questions, comments or concerns after bedside shift report. 1918: Patient demanded to have her door open. The off going nurse tried to explain that this is a COVID-19 Unit and that was not an option on this floor. Patient then stated \"Well If I can't keep my door open, I'm leaving! \" Patient was urged to comply with the plan of care and educated on the possible outcomes of leaving against medical advice before being given AMA papers. The hospitalist was paged. 1919: Patient decided not to leave against medical advice    1920: Patient called complaining that she needed some popsicles. Patient was informed that the staff were in the middle of change of shift and that one would be brought to her as soon as possible. 1925: Patient was brought some popsiicles by the nursing assistant. The patient complain that they were freezer burnt and demanded that the nursing assistant go get her another one from another floor. The nursing assistant explained that she could not leave the floor at this time, the patient demanded to speak with the Nursing Supervisor. 1945: Patient called the nursing station complaining of chest pain    1948: This nurse went to the patient's room and found her in the hallway yelling. The patient was instructed by this nurse to return to her room for her own safety because this is a COVID-19 Unit. The patient told this nurse \"You're not going to talk to me like that white boy! \" This nurse attempted to explain that this is a COVID-19 Unit and she needed to return to her room for her own safety. The patient stated \" I'm not going pack in that room and you better get the fuck away from me! \" She then demanded to speak with the Nursing Supervisor. This nurse called the Nursing Supervisor and a [place]. 2022: Patient was getting her vital signs taken when she asked the nursing assistant \" How many times have they tested you for COVID? \" The nursing assistant replied \"I prefer not to discuss my medical information with anyone. \" The patient then replied \" That's just like a nigger! Everyone else will tell me, but you won't. \"     2118: Patient called the Nurses' station demanding some popsicles. This nurse expained that no one here could leave the floor. The patient then stated \"I want to speak  Nursing Supervisor and who ever is above the Nursing Supervisor! \" This nurse informed the patient that the Nursing Supervisor is on the way. 2120: Patient was in the hallway yelling \"Where is that nasty white mother fucker! \" This nurse instructed the patient to stop yelling and return to her room. The patient stated \"I'm not going in my fucking room and you can't make me! \" Then she asked, \"What the fuck are you doing over here anyway? \" This nurse explained that I'm her nurse and she needs to return to her room for her own safety because this is a COVID-19 Unit. The patient refused, pointed at this nurse and said \" Make me mother fucker! \" This nurse then called a second code 9 [facility]. 0000: Patient refused her Accucheck. 1920: This nurse attempted to give patient her IV Zithromax, but was unable because the patient's peripheral IV was gone. When asked what happened to the IV, the patient stated \"I sweated it out. \" Patient declined another peripheral IV.     0700: Patient had an uneventful shift. Respiratory status, vital signs and MEWS score remained stable. Patient was resting quietly with no signs of distress noted. Bed locked and in lowest position.  Call bell water and personal belongings were within reach. Patient had no questions, comments or concerns after bedside shift report.  [facility]side [facility]

## 2021-03-29 NOTE — PROGRESS NOTES
RESPIRATORY CARE ASSESSMENT FOR BRONCHIAL HYGIENE OR LUNG EXPANSION THERAPY  Patient  Brandon Guadarrama     63 y.o.   female     3/29/2021  10:41 AM  Patient Active Problem List   Diagnosis Code    Abnormal CT of the chest R93.89    Asthma J45.909    Respiratory failure ([facility]) J96.90    [facility] of [facility] J44.1, J45.901    Essential hypertension I10    Morbid obesity due to excess calories ([facility]) E66.01    Hypertensive heart failure ([facility]) I11.0    Sleep apnea G47.30    COPD (chronic obstructive pulmonary disease) with chronic bronchitis ([facility]) J44.9    T wave inversion in EKG R94.31    Tobacco use Z72.0    S/P hernia repair Z98.890, Z87.19    CKD (chronic kidney disease) stage 3, GFR 30-59 ml/min N18.30    Cocaine abuse ([facility]) F14.10    Drug-seeking behavior Z76.5    Fibromyalgia M79.7    Depression F32.9    Fe deficiency anemia D50.9    Gastroesophageal reflux disease without esophagitis K21.9    HLD (hyperlipidemia) E78.5    Thyroid goiter E04.9    MDRO (multiple drug resistant organisms) resistance [id number]    On home O2 Z99.81    Polysubstance abuse ([facility]) V68.52    Uncomplicated severe persistent asthma J45.50    Vocal cord disease J38.3    COPD with acute exacerbation ([facility]) J44.1    Chronic respiratory failure with hypoxia ([facility]) J96.11    Acute bronchitis J20.9    Suspected COVID-19 virus infection Z20.822    Tachycardia R00.0    Normocytic anemia D64.9    Acute respiratory distress R06.03    COPD exacerbation ([facility]) J44.1    Acute exacerbation of chronic obstructive pulmonary disease (COPD) ([facility]) J44.1    UTI (urinary tract infection) N39.0    Atypical chest pain R07.89    DEMARCUS (acute kidney injury) ([facility]) N17.9    Asthma exacerbation J45. 0    COPD (chronic obstructive pulmonary disease) ([facility]) J44.9    Acute bronchitis with chronic obstructive pulmonary disease (COPD) ([facility]) J44.0, J20.9    CHF (congestive heart failure) ([facility]) I50.9    Long QT interval R94.31    Hypokalemia E87.6    Bilateral lower leg cellulitis L03.116, L03.115    Chest pain at rest R07.9    Elevated troponin R77.8    BiPAP (biphasic positive airway pressure) dependence Z99.89       ABG:  Date:3/29/2021  No results found for: PH, PHI, PCO2, PCO2I, PO2, PO2I, HCO3, HCO3I, FIO2, FIO2I    Chest X-ray:  Date:3/29/2021  Results from Hospital Encounter encounter on 03/27/21   XR CHEST PORT    Narrative INDICATION: Shortness of breath    COMPARISON:  Recent prior    FINDINGS: A portable AP radiograph of the chest was obtained at 2149 hours. Similar mild enlargement of cardiac silhouette. Similar mildly prominent  pulmonary vascularity. No confluent consolidation. No acute osseous abnormality. Impression Similar mild cardiomegaly and congestion.        Lab Test:  Date:3/29/2021  WBC:   Lab Results   Component Value Date/Time    WBC 7.4 03/29/2021 01:38 AM   HGB:   Lab Results   Component Value Date/Time    HGB 11.2 (L) 03/29/2021 01:38 AM    PLTS:   Lab Results   Component Value Date/Time    PLATELET 407 09/32/8527 01:38 AM       SaO2%/flow: @VOVDYNC2(4)@    Vital Signs:     Patient Vitals for the past 8 hrs:   Temp Pulse Resp SpO2   03/29/21 1036    100 %   03/29/21 0824 97.7 °F (36.5 °C)      03/29/21 0339 98.3 °F (36.8 °C) 94 22 100 %         RA/O2 flow/device:4L NC       First Inital Assesment:     [x]Yes []No   Reevaluation/Reassessment:    []Yes [x]No     CHART REVIEW   Points 0 X 1 X 2 X 3 X 4 X Points   Pulmonary History Smoking History (1) none  Recent Smoking History <1 PPD  Recent Smoking History >1 PPD  Pulmonary Disease or Impairment  Severe Pulmonary Disease  2   Surgical History No Surgery  General Surgery  Lower Abdominal  Thoracic or Upper Abdominal  Thoracic & Pulmonary Disease  0   CXR Clear or not indicated  Chronic changes or CXR Pending  Infiltrate, atelectasis or pleural effusions  Infiltrates in more than 1lobe  Infiltrates +atelectasis  +/or pleural effusions  2     PATIENT ASSESSMENT    0 X 1 X 2 X 3 X 4 X Points   Respiratory Pattern Regular pattern RR 12-20  Increased RR 21-27   Mild Dyspnea at rest, irregular pattern RR 28-32  Moderate Dyspnea at rest, Use of accessory muscles, RR 33-36  Severe Dyspnea, Use of accessory muscles RR >36  0   Mental Status Alert Oriented cooperative  Confused, Follows commands  Lethargic, Does not follow commands  Obtunded  Unresponsive  0   Breath Sounds Clear  Decreased Unilaterally  Decreased Bilaterally  Mild Scattered wheezing or Crackles in bases  Severe Wheezing or rhonchi  2   Cough Strong dry NPC  Strong Productive  Weak NPC  Weak productive or weak with rhonchi  No cough or may require suctioning  0   Level of Activity Ambulatory  Ambulatory with assistance  Temporarily Non-ambulatory  Non-Ambulatory, able to position self  Unable to position self, confined to bed  0   Total Points/Score:   6     Specific Intervention Chart()    Bronchial Hygiene/Secretion Clearance:    []EZPAP []Rotation bed with vibration    []CPT with percussor []CPT via vest   [x]Oscillastiang positive pressure expiratory device      Lung Expansion:    []Incentive Spirometer w/RT visits []Incentive Spirometer w/nursing    []EZPAP [] Metaneb     *Suctioning:    []Nasal Tracheal []Tracheal     *suctioning will be ordered and done PRN with an associated frequency such as QID/PRN based on score       Other:    Care Plan   Level # Score Modality Frequency Comment   Level 1 >17      Level 2 14-17      Level 3 10-13      Level 4 1-9 Aerobika PRN      BRONCHIAL HYGIENE SCORING AND FREQUENCY GUIDELINES   Frequency Indications/Findings Level #   Q4 ATC Copious secretions, SOB, unable to sleep 1   QID & PRN at night Moderate amounts of secretions 2   TID or Q6 wa Small amounts of secretions and poor cough: recent history of secretions 3   BID or Q8 wa Unable to deep breathe and cough effectively 4        Comments:  Aerobika PRN to help clear and mobilize secretions    Respiratory Therapist: Cici Ada, RT

## 2021-03-29 NOTE — PROGRESS NOTES
Pt without IV access. Attempted to place IV access. Pt refused. Dr Brook Brantley aware. Will attempt IV access at a later time.

## 2021-03-29 NOTE — PROGRESS NOTES
[facility]  Progress Note    Patient: Fidel Ryan Age: 63 y.o. : 1965 MR#: [id number] SSN: [id number]  Date: 3/29/2021     Subjective/24-hour events:     Patient stating that she currently feels like her breathing is difficult. She recently had been taken off of BiPAP prior to my arrival to the room and states that she is having concerns about her oxygen dropping any time that she has to go to the bathroom to urinate. She states she is very concerned about this breathing issue recurring. Denies active CP, PND, orthopnea. States she has TTP over sternal border. Shortly after seeing patient, she elected to leave without signing [employer] paperwork. Assessment:   COPD with acute exacerbation  Acute respiratory failure secondary to above  Hypertensive urgency  Tobacco use/active smoker  Substance abuse  Medical noncompliance    Plan:   BiPAP, wean as tolerated. Continue steroids and bronchodilators as ordered. Antihypertensive therapy, adjust as necessary.   Counseling given for concern of ongoing cocaine, tobacco, and opioid usage in setting of chronic pulmonary concerns    Objective:   VS:   Visit Vitals  BP (!) 187/81   Pulse 88   Temp 97.7 °F (36.5 °C)   Resp 19   Wt 76.2 kg (167 lb 15.9 oz)   LMP 2009   SpO2 100%   BMI 33.93 kg/m²      Tmax/24hrs: Temp (24hrs), Av.2 °F (36.8 °C), Min:97.7 °F (36.5 °C), Max:98.8 °F (37.1 °C)      Intake/Output Summary (Last 24 hours) at 3/29/2021 1539  Last data filed at 3/28/2021 1915  Gross per 24 hour   Intake 240 ml   Output 0 ml   Net 240 ml         Labs:    Recent Results (from the past 24 hour(s))   EKG, 12 LEAD, SUBSEQUENT    Collection Time: 21  7:51 PM   Result Value Ref Range    Ventricular Rate 109 BPM    Atrial Rate 109 BPM    P-R Interval 124 ms    QRS Duration 72 ms    Q-T Interval 316 ms    QTC Calculation (Bezet) 425 ms    Calculated P Axis 76 degrees    Calculated R Axis 35 degrees    Calculated T Axis 100 degrees    Diagnosis       Sinus tachycardia  Possible Left atrial enlargement  Anteroseptal infarct (cited on or before 28-MAR-2021)  T wave abnormality, consider lateral ischemia  Abnormal ECG    Confirmed by Conner Joshua MD, Merrianne Quiet ([id number]) on 3/29/2021 11:33:10 AM     HEMOGLOBIN A1C WITH EAG    Collection Time: 03/29/21  1:38 AM   Result Value Ref Range    Hemoglobin A1c 5.3 4.2 - 5.6 %    Est. average glucose 105 mg/dL   CBC WITH AUTOMATED DIFF    Collection Time: 03/29/21  1:38 AM   Result Value Ref Range    WBC 7.4 4.6 - 13.2 K/uL    RBC 3.94 (L) 4.20 - 5.30 M/uL    HGB 11.2 (L) 12.0 - 16.0 g/dL    HCT 35.9 35.0 - 45.0 %    MCV 91.1 74.0 - 97.0 FL    MCH 28.4 24.0 - 34.0 PG    MCHC 31.2 31.0 - 37.0 g/dL    RDW 14.7 (H) 11.6 - 14.5 %    PLATELET 899 444 - 687 K/uL    MPV 11.0 9.2 - 11.8 FL    NEUTROPHILS 90 (H) 40 - 73 %    LYMPHOCYTES 8 (L) 21 - 52 %    MONOCYTES 2 (L) 3 - 10 %    EOSINOPHILS 0 0 - 5 %    BASOPHILS 0 0 - 2 %    ABS. NEUTROPHILS 6.7 1.8 - 8.0 K/UL    ABS. LYMPHOCYTES 0.6 (L) 0.9 - 3.6 K/UL    ABS. MONOCYTES 0.1 0.05 - 1.2 K/UL    ABS. EOSINOPHILS 0.0 0.0 - 0.4 K/UL    ABS.  BASOPHILS 0.0 0.0 - 0.1 K/UL    DF AUTOMATED     METABOLIC PANEL, BASIC    Collection Time: 03/29/21  1:38 AM   Result Value Ref Range    Sodium 143 136 - 145 mmol/L    Potassium 3.5 3.5 - 5.5 mmol/L    Chloride 109 100 - 111 mmol/L    CO2 30 21 - 32 mmol/L    Anion gap 4 3.0 - 18 mmol/L    Glucose 138 (H) 74 - 99 mg/dL    BUN 19 (H) 7.0 - 18 MG/DL    Creatinine 1.01 0.6 - 1.3 MG/DL    BUN/Creatinine ratio 19 12 - 20      GFR est AA >60 >60 ml/min/1.73m2    GFR est non-AA 57 (L) >60 ml/min/1.73m2    Calcium 8.2 (L) 8.5 - 10.1 MG/DL   MAGNESIUM    Collection Time: 03/29/21  1:38 AM   Result Value Ref Range    Magnesium 2.1 1.6 - 2.6 mg/dL       Signed By: Becky Almanzar MD     March 29, 2021

## 2021-03-29 NOTE — PROGRESS NOTES
Answered call bell. Pt stating that she \"can't breathe\" and \"needs BiPAP put back on\". Entered Pt's room to find Pt standing up out of bed holding BiPAP mask. Asked Pt if she would like to sit in the bed so I could help her put the BiPAP mask back on. Pt states \"Don't touch me! I'm not sitting back in that bed! The sheets need to be changed! \" Attempted again to put the BiPAP mask back on the Pt. Pt snatches mask away from nurse stating \"GET AWAY FROM ME! [address]! \" In attempt to allow Pt to calm down, I informed her that I was going to get new sheets to change her bed with and that I would be right back. Returned to Pt's room to change bed. Attempted again to try to help Pt place BiPAP mask on her face. Pt continued to refuse to let me help her. Pt also refusing to wear telemetry, BP cuff, and cont O2 sensor. Pt on the phone with sister stating \"No one has come to help me\" while I was in room actively trying to help her. Nursing supervisor called to inform of situation.

## 2021-03-29 NOTE — PROGRESS NOTES
Patient presented to nursing station stating \"I am going home, which way is the door? \" Patient given AMA paperwork but refused to sign. She had all her belongings in hand and stated that her boyfriend was out front to pick her up. Dr. Anibal Lott made aware.

## 2021-03-30 ENCOUNTER — HOSPITAL ENCOUNTER (EMERGENCY)
Age: 56
Discharge: HOME | End: 2021-03-30
Attending: EMERGENCY MEDICINE
Payer: MEDICAID

## 2021-03-30 ENCOUNTER — APPOINTMENT (OUTPATIENT)
Dept: GENERAL RADIOLOGY | Age: 56
End: 2021-03-30
Attending: EMERGENCY MEDICINE
Payer: MEDICAID

## 2021-03-30 VITALS
DIASTOLIC BLOOD PRESSURE: 83 MMHG | TEMPERATURE: 98.3 F | HEART RATE: 91 BPM | SYSTOLIC BLOOD PRESSURE: 105 MMHG | OXYGEN SATURATION: 100 % | RESPIRATION RATE: 21 BRPM

## 2021-03-30 DIAGNOSIS — R45.4 EXCESSIVE ANGER: ICD-10-CM

## 2021-03-30 DIAGNOSIS — J44.1 CHRONIC OBSTRUCTIVE PULMONARY DISEASE WITH ACUTE EXACERBATION (HCC): Primary | ICD-10-CM

## 2021-03-30 LAB
ANION GAP SERPL CALC-SCNC: 4 MMOL/L (ref 3–18)
APTT PPP: 24.8 SEC (ref 23–36.4)
ATRIAL RATE: 67 BPM
BASOPHILS # BLD: 0 K/UL (ref 0–0.1)
BASOPHILS NFR BLD: 0 % (ref 0–2)
BUN SERPL-MCNC: 19 MG/DL (ref 7–18)
BUN/CREAT SERPL: 18 (ref 12–20)
CALCIUM SERPL-MCNC: 8.6 MG/DL (ref 8.5–10.1)
CALCULATED P AXIS, ECG09: 53 DEGREES
CALCULATED R AXIS, ECG10: 64 DEGREES
CALCULATED T AXIS, ECG11: 47 DEGREES
CHLORIDE SERPL-SCNC: 107 MMOL/L (ref 100–111)
CK MB CFR SERPL CALC: 5.6 % (ref 0–4)
CK MB SERPL-MCNC: 1.5 NG/ML (ref 5–25)
CK SERPL-CCNC: 27 U/L (ref 26–192)
CO2 SERPL-SCNC: 33 MMOL/L (ref 21–32)
CREAT SERPL-MCNC: 1.04 MG/DL (ref 0.6–1.3)
DIAGNOSIS, 93000: NORMAL
DIFFERENTIAL METHOD BLD: ABNORMAL
EOSINOPHIL # BLD: 0 K/UL (ref 0–0.4)
EOSINOPHIL NFR BLD: 0 % (ref 0–5)
ERYTHROCYTE [DISTWIDTH] IN BLOOD BY AUTOMATED COUNT: 15.6 % (ref 11.6–14.5)
GLUCOSE SERPL-MCNC: 100 MG/DL (ref 74–99)
HCT VFR BLD AUTO: 37 % (ref 35–45)
HGB BLD-MCNC: 11.3 G/DL (ref 12–16)
LYMPHOCYTES # BLD: 2 K/UL (ref 0.9–3.6)
LYMPHOCYTES NFR BLD: 17 % (ref 21–52)
MCH RBC QN AUTO: 29 PG (ref 24–34)
MCHC RBC AUTO-ENTMCNC: 30.5 G/DL (ref 31–37)
MCV RBC AUTO: 94.9 FL (ref 74–97)
MONOCYTES # BLD: 0.6 K/UL (ref 0.05–1.2)
MONOCYTES NFR BLD: 5 % (ref 3–10)
NEUTS SEG # BLD: 9.1 K/UL (ref 1.8–8)
NEUTS SEG NFR BLD: 78 % (ref 40–73)
P-R INTERVAL, ECG05: 130 MS
PLATELET # BLD AUTO: 222 K/UL (ref 135–420)
PMV BLD AUTO: 10.8 FL (ref 9.2–11.8)
POTASSIUM SERPL-SCNC: 3.5 MMOL/L (ref 3.5–5.5)
Q-T INTERVAL, ECG07: 380 MS
QRS DURATION, ECG06: 84 MS
QTC CALCULATION (BEZET), ECG08: 401 MS
RBC # BLD AUTO: 3.9 M/UL (ref 4.2–5.3)
SODIUM SERPL-SCNC: 144 MMOL/L (ref 136–145)
TROPONIN I SERPL-MCNC: <0.02 NG/ML (ref 0–0.04)
VENTRICULAR RATE, ECG03: 67 BPM
WBC # BLD AUTO: 11.8 K/UL (ref 4.6–13.2)

## 2021-03-30 PROCEDURE — 80048 BASIC METABOLIC PNL TOTAL CA: CPT

## 2021-03-30 PROCEDURE — 71045 X-RAY EXAM CHEST 1 VIEW: CPT

## 2021-03-30 PROCEDURE — 74011000250 HC RX REV CODE- 250: Performed by: EMERGENCY MEDICINE

## 2021-03-30 PROCEDURE — 96374 THER/PROPH/DIAG INJ IV PUSH: CPT

## 2021-03-30 PROCEDURE — 74011250636 HC RX REV CODE- 250/636: Performed by: EMERGENCY MEDICINE

## 2021-03-30 PROCEDURE — 85025 COMPLETE CBC W/AUTO DIFF WBC: CPT

## 2021-03-30 PROCEDURE — 94640 AIRWAY INHALATION TREATMENT: CPT

## 2021-03-30 PROCEDURE — 82553 CREATINE MB FRACTION: CPT

## 2021-03-30 PROCEDURE — 85730 THROMBOPLASTIN TIME PARTIAL: CPT

## 2021-03-30 PROCEDURE — 93005 ELECTROCARDIOGRAM TRACING: CPT

## 2021-03-30 PROCEDURE — 99285 EMERGENCY DEPT VISIT HI MDM: CPT

## 2021-03-30 RX ORDER — DOXYCYCLINE HYCLATE 100 MG
100 TABLET ORAL 2 TIMES DAILY
Qty: 14 TAB | Refills: 0 | Status: SHIPPED | OUTPATIENT
Start: 2021-03-30 | End: 2021-04-06

## 2021-03-30 RX ORDER — KETOROLAC TROMETHAMINE 15 MG/ML
15 INJECTION, SOLUTION INTRAMUSCULAR; INTRAVENOUS
Status: DISCONTINUED | OUTPATIENT
Start: 2021-03-30 | End: 2021-03-30 | Stop reason: HOSPADM

## 2021-03-30 RX ORDER — PREDNISONE 50 MG/1
50 TABLET ORAL DAILY
Qty: 4 TAB | Refills: 0 | Status: SHIPPED | OUTPATIENT
Start: 2021-03-30 | End: 2021-04-03

## 2021-03-30 RX ORDER — IPRATROPIUM BROMIDE AND ALBUTEROL SULFATE 2.5; .5 MG/3ML; MG/3ML
6 SOLUTION RESPIRATORY (INHALATION)
Status: COMPLETED | OUTPATIENT
Start: 2021-03-30 | End: 2021-03-30

## 2021-03-30 RX ADMIN — METHYLPREDNISOLONE SODIUM SUCCINATE 125 MG: 125 INJECTION, POWDER, FOR SOLUTION INTRAMUSCULAR; INTRAVENOUS at 10:31

## 2021-03-30 RX ADMIN — IPRATROPIUM BROMIDE AND ALBUTEROL SULFATE 6 ML: .5; 3 SOLUTION RESPIRATORY (INHALATION) at 10:31

## 2021-03-30 NOTE — ED PROVIDER NOTES
EMERGENCY DEPARTMENT HISTORY AND PHYSICAL EXAM    9:34 AM    Date: 3/30/2021  Patient Name: Reynold Marie    History of Presenting Illness     Chief Complaint   Patient presents with    Shortness of Breath       History Provided By: Patient  Location/Duration/Severity/Modifying factors   Patient is a 48-year-old female well-known to emergency department history of COPD presenting for shortness of breath. Patient has reported history of COPD, was admitted and left AMA yesterday. Reports that she was not doing well at home and wanted to come be seen again. No fevers or chills. No chest pain complains of subcostal pain with breathing and wants narcotics for this. Denies any headache or any additional concerns. Patient reports he is on 6 L at home. Breathing worsens when she tries to ambulate, improves with rest.  Symptoms have been going on chronically at least for 1 week possibly longer but worsening over the past approximately 4 days. PCP: Riccardo Wolfe MD    Current Outpatient Medications   Medication Sig Dispense Refill    predniSONE (DELTASONE) 50 mg tablet Take 1 Tab by mouth daily for 4 days. 4 Tab 0    doxycycline (VIBRA-TABS) 100 mg tablet Take 1 Tab by mouth two (2) times a day for 7 days. 14 Tab 0    albuterol (PROVENTIL VENTOLIN) 2.5 mg /3 mL (0.083 %) nebu 1 nebulizer treatment every 4 hours while awake for 1 week then every 4 hours as needed for shortness of breath 60 Each 1    cloNIDine HCL (CATAPRES) 0.2 mg tablet Take 1 Tab by mouth every twelve (12) hours. 60 Tab 0    famotidine (PEPCID) 20 mg tablet Take 1 Tab by mouth nightly. 10 Tab 0    losartan (COZAAR) 25 mg tablet Take 1 Tab by mouth daily. Indications: high blood pressure 30 Tab 0    Nebulizer & Compressor machine 1 Each by Does Not Apply route as needed for Wheezing or Shortness of Breath. 1 Each 0    fluticasone furoate-vilanteroL (Breo Ellipta) 200-25 mcg/dose inhaler Take 1 Puff by inhalation daily. Indications: controller medication for asthma 1 Inhaler 3    nitroglycerin (NITROSTAT) 0.4 mg SL tablet Take 1 Tab by mouth every five (5) minutes as needed for Chest Pain. Sit/Lay down then put one tab under the tongue every 5 minutes as needed for chest pain for 3 doses 1 Bottle 0    aspirin delayed-release 81 mg tablet Take 1 Tab by mouth daily. 30 Tab 0    montelukast (SINGULAIR) 10 mg tablet Take 1 Tab by mouth nightly. Indications: controller medication for asthma 30 Tab 1    roflumilast (DALIRESP) 500 mcg tab tablet Take 1 Tab by mouth daily. 30 Tab 3    citalopram (CeleXA) 20 mg tablet Take 1 Tab by mouth every morning. 30 Tab 0    OXYGEN-AIR DELIVERY SYSTEMS 3 L by IntraNASal route as needed for Other (sob).  atorvastatin (LIPITOR) 20 mg tablet Take 1 Tab by mouth nightly. 30 Tab 0    naloxone (NARCAN) 4 mg/actuation nasal spray Use 1 spray intranasally, then discard. Repeat with new spray every 2 min as needed for opioid overdose symptoms, alternating nostrils.  1 Each 0       Past History     Past Medical History:  Past Medical History:   Diagnosis Date    CHF (congestive heart failure) (HCC)     Chronic respiratory failure with hypoxia ([address] 9/7/2020    CKD (chronic kidney disease) stage 3, GFR 30-59 ml/min 10/31/2019    Cocaine abuse ([address] 11/26/2017    COPD (chronic obstructive pulmonary disease) with chronic bronchitis ([address] 12/19/2017    Dependence on supplemental oxygen     Depression 3/22/2020    Drug-seeking behavior 11/19/2016    Endocrine disease     thyroid issues    Essential hypertension 3/10/2017    Fe deficiency anemia 10/27/2012    Fibromyalgia 12/16/2016    Gastroesophageal reflux disease without esophagitis 10/10/2016    Gastrointestinal disorder     \"blockage in my stomach\"    Hypercholesterolemia     Hypertension     Hypertensive heart failure ([address] 12/19/2017    Morbid obesity due to excess calories ([address] 3/10/2017    NSTEMI (non-ST elevated myocardial infarction) ([facility] 3/22/2020    On home O2 12/3/2015    Overview:  2L at home per pt for approx 8 years    Polysubstance abuse ([facility] 9/15/2018    Respiratory failure ([facility] 1/11/2017    S/P hernia repair 10/31/2019    Sleep apnea 12/19/2017    T wave inversion in EKG 3/9/2019    Tobacco use 3/28/9944    Uncomplicated severe persistent asthma 12/13/2016    Vocal cord disease 12/7/2016       Past Surgical History:  Past Surgical History:   Procedure Laterality Date    HX GI      Exploratory laparotomy with lysis of adhesions and primary repair of incarcerated umbilical hernia       Family History:  History reviewed. No pertinent family history. Social History:  Social History     Tobacco Use    Smoking status: Former Smoker     Packs/day: 0.25    Smokeless tobacco: Former User    Tobacco comment: Pt states she has not had money to buy cigarettes in the past month   Substance Use Topics    Alcohol use: No     Comment: socially    Drug use: Not Currently     Types: Heroin     Comment: pt reports using approximately a month ago       Allergies:  No Known Allergies    I reviewed and confirmed the above information with patient and updated as necessary. Review of Systems     Review of Systems   Constitutional: Negative for chills and fever. HENT: Negative for congestion, rhinorrhea, sinus pressure and sneezing. Eyes: Negative for visual disturbance. Respiratory: Positive for cough, shortness of breath and wheezing. Cardiovascular: Negative for chest pain. Gastrointestinal: Negative for abdominal pain, diarrhea, nausea and vomiting. Genitourinary: Negative for dysuria, frequency and urgency. Musculoskeletal: Negative for back pain and neck pain. Skin: Negative for rash. Neurological: Negative for syncope, numbness and headaches.        Physical Exam     Visit Vitals  /83   Pulse 91   Temp 98.3 °F (36.8 °C)   Resp 21   LMP 04/14/2009   SpO2 100%       Physical Exam  Vitals signs and nursing note reviewed. Constitutional:       General: She is not in acute distress. Appearance: Normal appearance. She is normal weight. HENT:      Head: Normocephalic and atraumatic. Right Ear: External ear normal.      Left Ear: External ear normal.      Nose: Nose normal.      Mouth/Throat:      Mouth: Mucous membranes are moist.   Eyes:      Conjunctiva/sclera: Conjunctivae normal.   Neck:      Musculoskeletal: Normal range of motion and neck supple. Cardiovascular:      Rate and Rhythm: Normal rate and regular rhythm. Pulses: Normal pulses. Heart sounds: Normal heart sounds. No murmur. Pulmonary:      Effort: Pulmonary effort is normal. No tachypnea or accessory muscle usage. Breath sounds: Examination of the right-upper field reveals wheezing. Examination of the left-upper field reveals wheezing. Decreased breath sounds and wheezing present. No rhonchi or rales. Abdominal:      General: Abdomen is flat. Tenderness: There is no abdominal tenderness. There is no guarding or rebound. Musculoskeletal: Normal range of motion. General: No swelling or tenderness. Right lower leg: She exhibits no tenderness. No edema. Left lower leg: She exhibits no tenderness. No edema. Skin:     General: Skin is warm and dry. Capillary Refill: Capillary refill takes less than 2 seconds. Findings: No rash. Neurological:      General: No focal deficit present. Mental Status: She is alert.          Diagnostic Study Results     Labs -  Recent Results (from the past 24 hour(s))   EKG, 12 LEAD, INITIAL    Collection Time: 03/30/21  8:45 AM   Result Value Ref Range    Ventricular Rate 67 BPM    Atrial Rate 67 BPM    P-R Interval 130 ms    QRS Duration 84 ms    Q-T Interval 380 ms    QTC Calculation (Bezet) 401 ms    Calculated P Axis 53 degrees    Calculated R Axis 64 degrees    Calculated T Axis 47 degrees    Diagnosis       Normal sinus rhythm  Anterior infarct (cited on or before 28-MAR-2021)  T wave abnormality, consider lateral ischemia  Abnormal ECG  When compared with ECG of 28-MAR-2021 19:51,  Vent. rate has decreased BY  42 BPM  Confirmed by Evan Patricio MD, She Ward ([id number]) on 3/30/2021 1:55:28 PM     CBC WITH AUTOMATED DIFF    Collection Time: 03/30/21 10:30 AM   Result Value Ref Range    WBC 11.8 4.6 - 13.2 K/uL    RBC 3.90 (L) 4.20 - 5.30 M/uL    HGB 11.3 (L) 12.0 - 16.0 g/dL    HCT 37.0 35.0 - 45.0 %    MCV 94.9 74.0 - 97.0 FL    MCH 29.0 24.0 - 34.0 PG    MCHC 30.5 (L) 31.0 - 37.0 g/dL    RDW 15.6 (H) 11.6 - 14.5 %    PLATELET 824 256 - 599 K/uL    MPV 10.8 9.2 - 11.8 FL    NEUTROPHILS 78 (H) 40 - 73 %    LYMPHOCYTES 17 (L) 21 - 52 %    MONOCYTES 5 3 - 10 %    EOSINOPHILS 0 0 - 5 %    BASOPHILS 0 0 - 2 %    ABS. NEUTROPHILS 9.1 (H) 1.8 - 8.0 K/UL    ABS. LYMPHOCYTES 2.0 0.9 - 3.6 K/UL    ABS. MONOCYTES 0.6 0.05 - 1.2 K/UL    ABS. EOSINOPHILS 0.0 0.0 - 0.4 K/UL    ABS.  BASOPHILS 0.0 0.0 - 0.1 K/UL    DF AUTOMATED     METABOLIC PANEL, BASIC    Collection Time: 03/30/21 10:30 AM   Result Value Ref Range    Sodium 144 136 - 145 mmol/L    Potassium 3.5 3.5 - 5.5 mmol/L    Chloride 107 100 - 111 mmol/L    CO2 33 (H) 21 - 32 mmol/L    Anion gap 4 3.0 - 18 mmol/L    Glucose 100 (H) 74 - 99 mg/dL    BUN 19 (H) 7.0 - 18 MG/DL    Creatinine 1.04 0.6 - 1.3 MG/DL    BUN/Creatinine ratio 18 12 - 20      GFR est AA >60 >60 ml/min/1.73m2    GFR est non-AA 55 (L) >60 ml/min/1.73m2    Calcium 8.6 8.5 - 10.1 MG/DL   CARDIAC PANEL,(CK, CKMB & TROPONIN)    Collection Time: 03/30/21 10:30 AM   Result Value Ref Range    CK - MB 1.5 <3.6 ng/ml    CK-MB Index 5.6 (H) 0.0 - 4.0 %    CK 27 26 - 192 U/L    Troponin-I, QT <0.02 0.0 - 0.045 NG/ML   PTT    Collection Time: 03/30/21 10:30 AM   Result Value Ref Range    aPTT 24.8 23.0 - 36.4 SEC         Radiologic Studies -   XR CHEST SNGL V   Final Result      Borderline prominent cardiac silhouette with suspected mild perihilar infiltrate/edema. Prominent hilar shadows may represent prominent pulmonary   arteries. Probable left midlung atelectasis/scar. Medical Decision Making   I am the first provider for this patient. I reviewed the vital signs, available nursing notes, past medical history, past surgical history, family history and social history. Vital Signs-Reviewed the patient's vital signs. EKG: Normal sinus rhythm, rate of 67. Normal SD, QRS and QTc intervals. Normal axis. No acute changes. Records Reviewed: Nursing Notes and Old Medical Records (Time of Review: 9:34 AM)    ED Course: Progress Notes, Reevaluation, and Consults:  ED Course as of Mar 30 1900   Tue Mar 30, 2021   1133 As discussed with the hospitalist on-call, Dr. Cyn Shin. Patient on her baseline oxygen. Does not feel patient requires readmission. We will do an outpatient course of prednisone and doxycycline. The ED manager suggests that we obtain a TDO on the patient. [GERMAN]   8846 Discussed with crisis. They will see the patient. Not acutely suicidal although certainly concerned with how angry the patient has and that she may not comprehend all of her medical and psychosocial needs. [GERMAN]      ED Course User Index  [GERMAN] Edgar Brannon, DO         Provider Notes (Medical Decision Making):   MDM  Number of Diagnoses or Management Options  Chronic obstructive pulmonary disease with acute exacerbation ([facility] [address]  Excessive anger  Diagnosis management comments: 26-year-old female who presented to the emergency department with a chief complaint of difficulty breathing. Patient left AMA from the hospital yesterday. On exam she has occasional scattered wheezes. Minimal tachypnea on her baseline 6 L of oxygen. No respiratory distress. Patient has intermittent outbursts of anger at times. She frequently requests narcotics. She refused to see certain staff members for unclear reasons. She frequently refuses to wear her mask.   Chest x-ray does not show any clearly acute process. Suspect likely COPD exacerbation low suspicion for PE given absence of tachycardia or hypoxia. She does have wheezing on exam.  Patient given DuoNeb and seemed improved. Lab work is reassuring no signs of ACS or other acute cardiopulmonary process. No pneumothorax or signs of pneumonia. Discussed with the hospitalist as far as if they felt patient warranted readmission, they declined. This is I feel reasonable, given the patient is on her baseline oxygen and not requiring any increase. Does not appear to be in respiratory distress not requiring any positive pressure ventilation. Did try to consult psychiatry to see the patient given her excessive anger and frequent ED visits with related issues. Patient was unwilling to stay for this. I did send prednisone and doxycycline to her pharmacy. Patient adamant that she needed to be discharged and ultimately was discharged home. At this time, patient is stable and appropriate for discharge home.  Patient demonstrates understanding of current diagnoses and is in agreement with the treatment plan. Phillip Soares are advised that while the likelihood of serious underlying condition is low at this point given the evaluation performed today, we cannot fully rule it out. Phillip Soares are advised to immediately return with any new symptoms or worsening of current condition.  All questions have been answered. Jaquan Cope is given educational material regarding their diagnoses, including danger symptoms and when to return to the ED. This note was dictated utilizing Dragon voice recognition software. Unfortunately this leads to occasional typographical errors. I apologize in advance if the situation occurs. If questions occur please do not hesitate to contact me directly. Kaveh Done, DO          Procedures        Diagnosis     Clinical Impression:   1. Chronic obstructive pulmonary disease with acute exacerbation ([facility] [address]    2. Excessive anger        Disposition: Discharge    Follow-up Information     Follow up With Specialties Details Why Contact Info    Leif Cornejo MD Internal Medicine In 3 days  [address], [place] [id number]  [phone]      [facility] EMERGENCY DEPT Emergency Medicine  If symptoms worsen, As needed [address] [facility] [address]  [phone]           Discharge Medication List as of 3/30/2021  1:40 PM      START taking these medications    Details   doxycycline (VIBRA-TABS) 100 mg tablet Take 1 Tab by mouth two (2) times a day for 7 days. , Normal, Disp-14 Tab, R-0         CONTINUE these medications which have CHANGED    Details   predniSONE (DELTASONE) 50 mg tablet Take 1 Tab by mouth daily for 4 days. , Normal, Disp-4 Tab, R-0         CONTINUE these medications which have NOT CHANGED    Details   albuterol (PROVENTIL VENTOLIN) 2.5 mg /3 mL (0.083 %) nebu 1 nebulizer treatment every 4 hours while awake for 1 week then every 4 hours as needed for shortness of breath, Normal, Disp-60 Each, R-1      cloNIDine HCL (CATAPRES) 0.2 mg tablet Take 1 Tab by mouth every twelve (12) hours. , Normal, Disp-60 Tab, R-0      famotidine (PEPCID) 20 mg tablet Take 1 Tab by mouth nightly., Normal, Disp-10 Tab, R-0      losartan (COZAAR) 25 mg tablet Take 1 Tab by mouth daily. Indications: high blood pressure, Normal, Disp-30 Tab, R-0      Nebulizer & Compressor machine 1 Each by Does Not Apply route as needed for Wheezing or Shortness of Breath., Print, Disp-1 Each, R-0      fluticasone furoate-vilanteroL (Breo Ellipta) 200-25 mcg/dose inhaler Take 1 Puff by inhalation daily. Indications: controller medication for asthma, Print, Disp-1 Inhaler, R-3      nitroglycerin (NITROSTAT) 0.4 mg SL tablet Take 1 Tab by mouth every five (5) minutes as needed for Chest Pain.  Sit/Lay down then put one tab under the tongue every 5 minutes as needed for chest pain for 3 doses, Normal, Disp-1 Bottle, R-0      aspirin delayed-release 81 mg tablet Take 1 Tab by mouth daily. , Normal, Disp-30 Tab, R-0      montelukast (SINGULAIR) 10 mg tablet Take 1 Tab by mouth nightly. Indications: controller medication for asthma, Print, Disp-30 Tab, R-1      roflumilast (DALIRESP) 500 mcg tab tablet Take 1 Tab by mouth daily. , Print, Disp-30 Tab, R-3      citalopram (CeleXA) 20 mg tablet Take 1 Tab by mouth every morning., Normal, Disp-30 Tab,R-0      OXYGEN-AIR DELIVERY SYSTEMS 3 L by IntraNASal route as needed for Other (sob). , Historical Med      atorvastatin (LIPITOR) 20 mg tablet Take 1 Tab by mouth nightly. , Print, Disp-30 Tab, R-0      naloxone (NARCAN) 4 mg/actuation nasal spray Use 1 spray intranasally, then discard. Repeat with new spray every 2 min as needed for opioid overdose symptoms, alternating nostrils. , Print, Disp-1 Each, R-0             Leonila Rodriguez DO   Emergency Medicine   March 30, 2021, 9:34 AM     This note is dictated utilizing Dragon voice recognition software. Unfortunately this leads to occasional typographical errors using the voice recognition. I apologize in advance if the situation occurs. If questions occur please do not hesitate to contact me directly.     Leonila Rodriguez, DO  \

## 2021-03-30 NOTE — ED TRIAGE NOTES
Pt arrives vis EMS with C/O SOB and cough. Pt arrived talking loudly to EMS staff about how they did not help her. Pt refused her Duo-Neb from EMS and asked for CPAP. Pt did not appear labored.  EMS states that pt left AMA yesterday because they were not helping her in the hospital.

## 2021-03-30 NOTE — ED NOTES
Pt given healthy choice meal, turkey sandwich, peanut butter and jelly sandwich , cup of coffee and  3 grape juices.

## 2021-03-30 NOTE — ED PROVIDER NOTES
9: 07 AM attempted to see the patient again. She is still refusing to wear a mask, yelling at me, stating that I am threatening her when I am simply asking her if she is ready to be seen and to put her mask on. Pt stating she won't be seen by me. Informed her that whomever sees her she still needs to wear her mask.

## 2021-03-30 NOTE — ED PROVIDER NOTES
8:45 AM attempted to see the patient. Introduced myself, informed her I'd be taking care of her today. Patient is not wearing a mask, I asked her to put a mask on. She is refusing. I informed her I would not be able to see her until she has her mask on as this is a role for everyone. Patient began yelling, calling me a \"white bitch,\" saying we are going to have a bad day. I informed her I could see her when she has a mask on.

## 2021-04-03 ENCOUNTER — HOSPITAL ENCOUNTER (EMERGENCY)
Age: 56
Discharge: HOME | End: 2021-04-03
Attending: EMERGENCY MEDICINE
Payer: MEDICAID

## 2021-04-03 ENCOUNTER — APPOINTMENT (OUTPATIENT)
Dept: GENERAL RADIOLOGY | Age: 56
End: 2021-04-03
Attending: EMERGENCY MEDICINE
Payer: MEDICAID

## 2021-04-03 VITALS
HEART RATE: 88 BPM | TEMPERATURE: 98.8 F | RESPIRATION RATE: 22 BRPM | OXYGEN SATURATION: 98 % | SYSTOLIC BLOOD PRESSURE: 192 MMHG | DIASTOLIC BLOOD PRESSURE: 138 MMHG

## 2021-04-03 DIAGNOSIS — J44.1 ACUTE EXACERBATION OF CHRONIC OBSTRUCTIVE PULMONARY DISEASE (COPD) (HCC): Primary | ICD-10-CM

## 2021-04-03 LAB
ALBUMIN SERPL-MCNC: 3.4 G/DL (ref 3.4–5)
ALBUMIN/GLOB SERPL: 1 {RATIO} (ref 0.8–1.7)
ALP SERPL-CCNC: 97 U/L (ref 45–117)
ALT SERPL-CCNC: 29 U/L (ref 13–56)
ANION GAP SERPL CALC-SCNC: 5 MMOL/L (ref 3–18)
AST SERPL-CCNC: 23 U/L (ref 10–38)
ATRIAL RATE: 72 BPM
BASOPHILS # BLD: 0 K/UL (ref 0–0.1)
BASOPHILS NFR BLD: 0 % (ref 0–2)
BILIRUB SERPL-MCNC: 0.5 MG/DL (ref 0.2–1)
BNP SERPL-MCNC: 3142 PG/ML (ref 0–900)
BUN SERPL-MCNC: 16 MG/DL (ref 7–18)
BUN/CREAT SERPL: 17 (ref 12–20)
CALCIUM SERPL-MCNC: 8.1 MG/DL (ref 8.5–10.1)
CALCULATED P AXIS, ECG09: 51 DEGREES
CALCULATED R AXIS, ECG10: 22 DEGREES
CALCULATED T AXIS, ECG11: -44 DEGREES
CHLORIDE SERPL-SCNC: 104 MMOL/L (ref 100–111)
CK MB CFR SERPL CALC: 3.1 % (ref 0–4)
CK MB SERPL-MCNC: 2.7 NG/ML (ref 5–25)
CK SERPL-CCNC: 87 U/L (ref 26–192)
CO2 SERPL-SCNC: 32 MMOL/L (ref 21–32)
CREAT SERPL-MCNC: 0.93 MG/DL (ref 0.6–1.3)
DIAGNOSIS, 93000: NORMAL
DIFFERENTIAL METHOD BLD: ABNORMAL
EOSINOPHIL # BLD: 0.1 K/UL (ref 0–0.4)
EOSINOPHIL NFR BLD: 1 % (ref 0–5)
ERYTHROCYTE [DISTWIDTH] IN BLOOD BY AUTOMATED COUNT: 14.7 % (ref 11.6–14.5)
GLOBULIN SER CALC-MCNC: 3.5 G/DL (ref 2–4)
GLUCOSE SERPL-MCNC: 94 MG/DL (ref 74–99)
HCT VFR BLD AUTO: 39.4 % (ref 35–45)
HGB BLD-MCNC: 12.5 G/DL (ref 12–16)
LYMPHOCYTES # BLD: 1.6 K/UL (ref 0.9–3.6)
LYMPHOCYTES NFR BLD: 16 % (ref 21–52)
MAGNESIUM SERPL-MCNC: 2.3 MG/DL (ref 1.6–2.6)
MCH RBC QN AUTO: 28.9 PG (ref 24–34)
MCHC RBC AUTO-ENTMCNC: 31.7 G/DL (ref 31–37)
MCV RBC AUTO: 91.2 FL (ref 74–97)
MONOCYTES # BLD: 0.7 K/UL (ref 0.05–1.2)
MONOCYTES NFR BLD: 7 % (ref 3–10)
NEUTS SEG # BLD: 7.3 K/UL (ref 1.8–8)
NEUTS SEG NFR BLD: 76 % (ref 40–73)
P-R INTERVAL, ECG05: 132 MS
PLATELET # BLD AUTO: 283 K/UL (ref 135–420)
PMV BLD AUTO: 10.3 FL (ref 9.2–11.8)
POTASSIUM SERPL-SCNC: 4 MMOL/L (ref 3.5–5.5)
PROT SERPL-MCNC: 6.9 G/DL (ref 6.4–8.2)
Q-T INTERVAL, ECG07: 444 MS
QRS DURATION, ECG06: 88 MS
QTC CALCULATION (BEZET), ECG08: 486 MS
RBC # BLD AUTO: 4.32 M/UL (ref 4.2–5.3)
SODIUM SERPL-SCNC: 141 MMOL/L (ref 136–145)
TROPONIN I SERPL-MCNC: 0.03 NG/ML (ref 0–0.04)
VENTRICULAR RATE, ECG03: 72 BPM
WBC # BLD AUTO: 9.6 K/UL (ref 4.6–13.2)

## 2021-04-03 PROCEDURE — 82553 CREATINE MB FRACTION: CPT

## 2021-04-03 PROCEDURE — 83880 ASSAY OF NATRIURETIC PEPTIDE: CPT

## 2021-04-03 PROCEDURE — 71045 X-RAY EXAM CHEST 1 VIEW: CPT

## 2021-04-03 PROCEDURE — 85025 COMPLETE CBC W/AUTO DIFF WBC: CPT

## 2021-04-03 PROCEDURE — 93005 ELECTROCARDIOGRAM TRACING: CPT

## 2021-04-03 PROCEDURE — 99283 EMERGENCY DEPT VISIT LOW MDM: CPT

## 2021-04-03 PROCEDURE — 80053 COMPREHEN METABOLIC PANEL: CPT

## 2021-04-03 PROCEDURE — 83735 ASSAY OF MAGNESIUM: CPT

## 2021-04-03 RX ORDER — MAGNESIUM SULFATE HEPTAHYDRATE 40 MG/ML
2 INJECTION, SOLUTION INTRAVENOUS
Status: DISCONTINUED | OUTPATIENT
Start: 2021-04-03 | End: 2021-04-03 | Stop reason: HOSPADM

## 2021-04-03 RX ORDER — IPRATROPIUM BROMIDE AND ALBUTEROL SULFATE 2.5; .5 MG/3ML; MG/3ML
3 SOLUTION RESPIRATORY (INHALATION)
Status: DISCONTINUED | OUTPATIENT
Start: 2021-04-03 | End: 2021-04-03 | Stop reason: HOSPADM

## 2021-04-03 RX ORDER — FUROSEMIDE 10 MG/ML
40 INJECTION INTRAMUSCULAR; INTRAVENOUS
Status: DISCONTINUED | OUTPATIENT
Start: 2021-04-03 | End: 2021-04-03 | Stop reason: HOSPADM

## 2021-04-03 RX ORDER — CLONIDINE HYDROCHLORIDE 0.1 MG/1
0.2 TABLET ORAL
Status: DISCONTINUED | OUTPATIENT
Start: 2021-04-03 | End: 2021-04-03 | Stop reason: HOSPADM

## 2021-04-03 NOTE — ED PROVIDER NOTES
Laurie Farmer is a 63 y.o. female with past medical history of COPD on home O2, hypertension, CHF, hyperlipidemia, and polysubstance abuse coming in complaining of shortness of breath. Patient states that for the last day or 2 she has been very short of breath. She states that her COPD is acting up. She states she been wheezing and giving herself her breathing treatments. She states that she was prescribed prednisone, however has not filled the prescription. She denies any cough or fever. She denies any chest pain. She does report some acute on chronic bilateral lower extremity edema. History is very limited due to patient cooperation.            Past Medical History:   Diagnosis Date    CHF (congestive heart failure) (HCC)     Chronic respiratory failure with hypoxia ([address] 9/7/2020    CKD (chronic kidney disease) stage 3, GFR 30-59 ml/min 10/31/2019    Cocaine abuse ([address] 11/26/2017    COPD (chronic obstructive pulmonary disease) with chronic bronchitis ([address] 12/19/2017    Dependence on supplemental oxygen     Depression 3/22/2020    Drug-seeking behavior 11/19/2016    Endocrine disease     thyroid issues    Essential hypertension 3/10/2017    Fe deficiency anemia 10/27/2012    Fibromyalgia 12/16/2016    Gastroesophageal reflux disease without esophagitis 10/10/2016    Gastrointestinal disorder     \"blockage in my stomach\"    Hypercholesterolemia     Hypertension     Hypertensive heart failure ([address] 12/19/2017    Morbid obesity due to excess calories ([address] 3/10/2017    NSTEMI (non-ST elevated myocardial infarction) ([address] 3/22/2020    On home O2 12/3/2015    Overview:  2L at home per pt for approx 8 years    Polysubstance abuse ([address] 9/15/2018    Respiratory failure ([address] 1/11/2017    S/P hernia repair 10/31/2019    Sleep apnea 12/19/2017    T wave inversion in EKG 3/9/2019    Tobacco use 1/95/9767    Uncomplicated severe persistent asthma 12/13/2016    Vocal cord disease 12/7/2016       Past Surgical History:   Procedure Laterality Date    HX GI      Exploratory laparotomy with lysis of adhesions and primary repair of incarcerated umbilical hernia         No family history on file. Social History     Socioeconomic History    Marital status: SINGLE     Spouse name: Not on file    Number of children: Not on file    Years of education: Not on file    Highest education level: Not on file   Occupational History    Not on file   Social Needs    Financial resource strain: Not on file    Food insecurity     Worry: Not on file     Inability: Not on file    Transportation needs     Medical: Not on file     Non-medical: Not on file   Tobacco Use    Smoking status: Former Smoker     Packs/day: 0.25    Smokeless tobacco: Former User    Tobacco comment: Pt states she has not had money to buy cigarettes in the past month   Substance and Sexual Activity    Alcohol use: No     Comment: socially    Drug use: Not Currently     Types: Heroin     Comment: pt reports using approximately a month ago    Sexual activity: Not Currently     Comment: last used 9 years ago   Lifestyle    Physical activity     Days per week: Not on file     Minutes per session: Not on file    Stress: Not on file   Relationships    Social connections     Talks on phone: Not on file     Gets together: Not on file     Attends [employer] service: Not on file     Active member of club or organization: Not on file     Attends meetings of clubs or organizations: Not on file     Relationship status: Not on file    Intimate partner violence     Fear of current or ex partner: Not on file     Emotionally abused: Not on file     Physically abused: Not on file     Forced sexual activity: Not on file   Other Topics Concern    Not on file   Social History Narrative    Not on file         ALLERGIES: Patient has no known allergies. Review of Systems   Constitutional: Negative. Negative for chills and fever. Respiratory: Positive for shortness of breath. Negative for cough. Cardiovascular: Positive for leg swelling. Negative for chest pain. Gastrointestinal: Negative. Negative for abdominal pain and vomiting. Genitourinary: Negative. Negative for dysuria. Musculoskeletal: Negative. Skin: Negative. Negative for rash. Neurological: Negative. Negative for weakness and numbness. All other systems reviewed and are negative. Vitals:    04/03/21 0131 04/03/21 0148   BP: (!) 174/112 (!) 192/138   Pulse: 87 88   Resp: 24 22   Temp: 98.8 °F (37.1 °C)    SpO2: 100% 98%            Physical Exam  Vitals signs reviewed. Constitutional:       Appearance: Normal appearance. She is well-developed. Comments: Patient extremely anxious. She is very fidgety and only intermittently cooperative. She often needs to be redirected to answer questions or follow commands. HENT:      Head: Normocephalic and atraumatic. Mouth/Throat:      Mouth: Mucous membranes are dry. Eyes:      Extraocular Movements: Extraocular movements intact. Conjunctiva/sclera: Conjunctivae normal.      Pupils: Pupils are equal, round, and reactive to light. Neck:      Musculoskeletal: Normal range of motion and neck supple. Cardiovascular:      Rate and Rhythm: Normal rate and regular rhythm. Heart sounds: S1 normal and S2 normal. No murmur. No friction rub. No gallop. Pulmonary:      Effort: Tachypnea and accessory muscle usage present. No respiratory distress or retractions. Breath sounds: Decreased air movement present. Examination of the right-upper field reveals wheezing. Examination of the left-upper field reveals wheezing. Examination of the right-middle field reveals wheezing. Examination of the left-middle field reveals wheezing. Examination of the right-lower field reveals wheezing. Examination of the left-lower field reveals wheezing. Wheezing present.    Abdominal:      General: There is no distension. Palpations: Abdomen is soft. Tenderness: There is no abdominal tenderness. Musculoskeletal: Normal range of motion. General: No tenderness. Right lower leg: Edema present. Left lower leg: Edema present. Skin:     General: Skin is warm. Findings: No rash. Neurological:      General: No focal deficit present. Mental Status: She is alert and oriented to person, place, and time. Psychiatric:         Speech: Speech normal.          MDM  Number of Diagnoses or Management Options  Diagnosis management comments: Joaquim Marquis is a 63 y.o. female who is very well-known to the emergency department coming in complaining of shortness of breath. Appears to have some degree of bronchospasm and reactive airway disease. She is wheezing with some decreased breath sounds. Will initiate standard treatment with soluMedrol, mag, and inhaled beta agonists.          Procedures        Vitals:  Patient Vitals for the past 12 hrs:   Temp Pulse Resp BP SpO2   04/03/21 0148 -- 88 22 (!) 192/138 98 %   04/03/21 0131 98.8 °F (37.1 °C) 87 24 (!) 174/112 100 %       Medications ordered:   Medications   albuterol-ipratropium (DUO-NEB) 2.5 MG-0.5 MG/3 ML (has no administration in time range)   methylPREDNISolone (PF) (Solu-MEDROL) injection 125 mg (has no administration in time range)   magnesium sulfate 2 g/50 ml IVPB (premix or compounded) (has no administration in time range)   albuterol-ipratropium (DUO-NEB) 2.5 MG-0.5 MG/3 ML (has no administration in time range)   furosemide (LASIX) injection 40 mg (has no administration in time range)   cloNIDine HCL (CATAPRES) tablet 0.2 mg (has no administration in time range)         Lab findings:  Recent Results (from the past 12 hour(s))   CBC WITH AUTOMATED DIFF    Collection Time: 04/03/21  1:49 AM   Result Value Ref Range    WBC 9.6 4.6 - 13.2 K/uL    RBC 4.32 4.20 - 5.30 M/uL    HGB 12.5 12.0 - 16.0 g/dL    HCT 39.4 35.0 - 45.0 %    MCV 91.2 74.0 - 97.0 FL    MCH 28.9 24.0 - 34.0 PG    MCHC 31.7 31.0 - 37.0 g/dL    RDW 14.7 (H) 11.6 - 14.5 %    PLATELET 647 063 - 450 K/uL    MPV 10.3 9.2 - 11.8 FL    NEUTROPHILS 76 (H) 40 - 73 %    LYMPHOCYTES 16 (L) 21 - 52 %    MONOCYTES 7 3 - 10 %    EOSINOPHILS 1 0 - 5 %    BASOPHILS 0 0 - 2 %    ABS. NEUTROPHILS 7.3 1.8 - 8.0 K/UL    ABS. LYMPHOCYTES 1.6 0.9 - 3.6 K/UL    ABS. MONOCYTES 0.7 0.05 - 1.2 K/UL    ABS. EOSINOPHILS 0.1 0.0 - 0.4 K/UL    ABS. BASOPHILS 0.0 0.0 - 0.1 K/UL    DF AUTOMATED     CARDIAC PANEL,(CK, CKMB & TROPONIN)    Collection Time: 04/03/21  2:12 AM   Result Value Ref Range    CK - MB 2.7 <3.6 ng/ml    CK-MB Index 3.1 0.0 - 4.0 %    CK 87 26 - 192 U/L    Troponin-I, QT 0.03 0.0 - 0.045 NG/ML   MAGNESIUM    Collection Time: 04/03/21  2:12 AM   Result Value Ref Range    Magnesium 2.3 1.6 - 2.6 mg/dL   METABOLIC PANEL, COMPREHENSIVE    Collection Time: 04/03/21  2:12 AM   Result Value Ref Range    Sodium 141 136 - 145 mmol/L    Potassium 4.0 3.5 - 5.5 mmol/L    Chloride 104 100 - 111 mmol/L    CO2 32 21 - 32 mmol/L    Anion gap 5 3.0 - 18 mmol/L    Glucose 94 74 - 99 mg/dL    BUN 16 7.0 - 18 MG/DL    Creatinine 0.93 0.6 - 1.3 MG/DL    BUN/Creatinine ratio 17 12 - 20      GFR est AA >60 >60 ml/min/1.73m2    GFR est non-AA >60 >60 ml/min/1.73m2    Calcium 8.1 (L) 8.5 - 10.1 MG/DL    Bilirubin, total 0.5 0.2 - 1.0 MG/DL    ALT (SGPT) 29 13 - 56 U/L    AST (SGOT) 23 10 - 38 U/L    Alk.  phosphatase 97 45 - 117 U/L    Protein, total 6.9 6.4 - 8.2 g/dL    Albumin 3.4 3.4 - 5.0 g/dL    Globulin 3.5 2.0 - 4.0 g/dL    A-G Ratio 1.0 0.8 - 1.7     NT-PRO BNP    Collection Time: 04/03/21  2:12 AM   Result Value Ref Range    NT pro-BNP 3,142 (H) 0 - 900 PG/ML   EKG, 12 LEAD, INITIAL    Collection Time: 04/03/21  7:20 AM   Result Value Ref Range    Ventricular Rate 72 BPM    Atrial Rate 72 BPM    P-R Interval 132 ms    QRS Duration 88 ms    Q-T Interval 444 ms    QTC Calculation (Bezet) 486 ms Calculated P Axis 51 degrees    Calculated R Axis 22 degrees    Calculated T Axis -44 degrees    Diagnosis       Normal sinus rhythm  Anterior infarct (cited on or before 28-MAR-2021)  T wave abnormality, consider inferolateral ischemia  Abnormal ECG  When compared with ECG of 30-MAR-2021 08:45,  Serial changes of Anterior infarct present         EKG interpretation by ED Physician: Normal sinus rhythm. Rate of 72 bpm.  LVH with repolarization abnormality. Wandering baseline, difficult to interpret, but no obvious ischemic changes. X-Ray, CT or other radiology findings or impressions:  XR CHEST PORT    (Results Pending)       Progress notes, Consult notes or additional Procedure notes:     Reevaluation of patient:   I have reassessed the patient. Patient is feeling better and resting comfortably in bed in no distress. Patient was advised that she needs to take the prednisone which was prescribed to her and to quit smoking and stay with from drugs. She was instructed to make sure that she takes her blood pressure medication. She is instructed follow-up with her regular doctor. She verbalizes understanding agrees. Disposition:  Diagnosis:   1.  Acute exacerbation of chronic obstructive pulmonary disease (COPD) ([facility]        Disposition: Discharge    Follow-up Information     Follow up With Specialties Details Why Contact Info    Ant Mayberry MD Internal Medicine Schedule an appointment as soon as possible for a visit  for office follow up [address] [place] [address], [place] [phone]      [facility] EMERGENCY DEPT Emergency Medicine  As needed, If symptoms worsen [address] [address]  [phone]           Patient's Medications   Start Taking    No medications on file   Continue Taking    ALBUTEROL (PROVENTIL VENTOLIN) 2.5 MG /3 ML (0.083 %) NEBU    1 nebulizer treatment every 4 hours while awake for 1 week then every 4 hours as needed for shortness of breath    ASPIRIN DELAYED-RELEASE 81 MG TABLET    Take 1 Tab by mouth daily. ATORVASTATIN (LIPITOR) 20 MG TABLET    Take 1 Tab by mouth nightly. CITALOPRAM (CELEXA) 20 MG TABLET    Take 1 Tab by mouth every morning. CLONIDINE HCL (CATAPRES) 0.2 MG TABLET    Take 1 Tab by mouth every twelve (12) hours. DOXYCYCLINE (VIBRA-TABS) 100 MG TABLET    Take 1 Tab by mouth two (2) times a day for 7 days. FAMOTIDINE (PEPCID) 20 MG TABLET    Take 1 Tab by mouth nightly. FLUTICASONE FUROATE-VILANTEROL (BREO ELLIPTA) 200-25 MCG/DOSE INHALER    Take 1 Puff by inhalation daily. Indications: controller medication for asthma    LOSARTAN (COZAAR) 25 MG TABLET    Take 1 Tab by mouth daily. Indications: high blood pressure    MONTELUKAST (SINGULAIR) 10 MG TABLET    Take 1 Tab by mouth nightly. Indications: controller medication for asthma    NALOXONE (NARCAN) 4 MG/ACTUATION NASAL SPRAY    Use 1 spray intranasally, then discard. Repeat with new spray every 2 min as needed for opioid overdose symptoms, alternating nostrils. NEBULIZER & COMPRESSOR MACHINE    1 Each by Does Not Apply route as needed for Wheezing or Shortness of Breath. NITROGLYCERIN (NITROSTAT) 0.4 MG SL TABLET    Take 1 Tab by mouth every five (5) minutes as needed for Chest Pain. Sit/Lay down then put one tab under the tongue every 5 minutes as needed for chest pain for 3 doses    OXYGEN-AIR DELIVERY SYSTEMS    3 L by IntraNASal route as needed for Other (sob). PREDNISONE (DELTASONE) 50 MG TABLET    Take 1 Tab by mouth daily for 4 days. ROFLUMILAST (DALIRESP) 500 MCG TAB TABLET    Take 1 Tab by mouth daily.    These Medications have changed    No medications on file   Stop Taking    No medications on file

## 2021-04-03 NOTE — ED NOTES
And is requesting reevaluation she feels short of breath not ready to leave. I observed her walk to the front door and then walked back with her home oxygen she also apparently walked to the closet for clothes and is now sob. BC and chemistry unremarkable  NP elevated    1. Stable mildly enlarged cardiac silhouette. Slightly increased interstitial  edema versus interstitial infiltrate. Pt given nebs/solumedrol/lasix by dr Kristen Zhu.

## 2021-04-03 NOTE — ED NOTES
Patient medicade cab arrived, refusing to be discharged says she \"feel worst then when she came here yesterday. \"

## 2021-04-03 NOTE — ED NOTES
Patient standing at the nurse's station cursing and calling the nurse out of her name. This nurse is not the patient's primary nurse nor had any interaction with the patient during this shift. This nurse continues not to address the patient.

## 2021-04-10 ENCOUNTER — APPOINTMENT (OUTPATIENT)
Dept: GENERAL RADIOLOGY | Age: 56
End: 2021-04-10
Attending: STUDENT IN AN ORGANIZED HEALTH CARE EDUCATION/TRAINING PROGRAM
Payer: MEDICAID

## 2021-04-10 ENCOUNTER — HOSPITAL ENCOUNTER (EMERGENCY)
Age: 56
Discharge: HOME | End: 2021-04-10
Attending: STUDENT IN AN ORGANIZED HEALTH CARE EDUCATION/TRAINING PROGRAM
Payer: MEDICAID

## 2021-04-10 VITALS
TEMPERATURE: 98.6 F | RESPIRATION RATE: 20 BRPM | OXYGEN SATURATION: 100 % | DIASTOLIC BLOOD PRESSURE: 96 MMHG | HEART RATE: 88 BPM | SYSTOLIC BLOOD PRESSURE: 182 MMHG

## 2021-04-10 DIAGNOSIS — J44.1 ACUTE EXACERBATION OF CHRONIC OBSTRUCTIVE PULMONARY DISEASE (COPD) (HCC): Primary | ICD-10-CM

## 2021-04-10 DIAGNOSIS — E87.6 HYPOKALEMIA: ICD-10-CM

## 2021-04-10 DIAGNOSIS — L03.116 CELLULITIS OF LEFT LOWER EXTREMITY: ICD-10-CM

## 2021-04-10 LAB
ANION GAP SERPL CALC-SCNC: 2 MMOL/L (ref 3–18)
ATRIAL RATE: 100 BPM
BASOPHILS # BLD: 0 K/UL (ref 0–0.1)
BASOPHILS NFR BLD: 0 % (ref 0–2)
BUN SERPL-MCNC: 12 MG/DL (ref 7–18)
BUN/CREAT SERPL: 11 (ref 12–20)
CALCIUM SERPL-MCNC: 8.8 MG/DL (ref 8.5–10.1)
CALCULATED P AXIS, ECG09: 69 DEGREES
CALCULATED R AXIS, ECG10: 48 DEGREES
CALCULATED T AXIS, ECG11: 52 DEGREES
CHLORIDE SERPL-SCNC: 106 MMOL/L (ref 100–111)
CO2 SERPL-SCNC: 36 MMOL/L (ref 21–32)
CREAT SERPL-MCNC: 1.1 MG/DL (ref 0.6–1.3)
DIAGNOSIS, 93000: NORMAL
DIFFERENTIAL METHOD BLD: ABNORMAL
EOSINOPHIL # BLD: 0.1 K/UL (ref 0–0.4)
EOSINOPHIL NFR BLD: 1 % (ref 0–5)
ERYTHROCYTE [DISTWIDTH] IN BLOOD BY AUTOMATED COUNT: 14.4 % (ref 11.6–14.5)
GLUCOSE SERPL-MCNC: 90 MG/DL (ref 74–99)
HCT VFR BLD AUTO: 42.3 % (ref 35–45)
HGB BLD-MCNC: 13 G/DL (ref 12–16)
LYMPHOCYTES # BLD: 1.4 K/UL (ref 0.9–3.6)
LYMPHOCYTES NFR BLD: 20 % (ref 21–52)
MCH RBC QN AUTO: 28.6 PG (ref 24–34)
MCHC RBC AUTO-ENTMCNC: 30.7 G/DL (ref 31–37)
MCV RBC AUTO: 93.2 FL (ref 74–97)
MONOCYTES # BLD: 0.7 K/UL (ref 0.05–1.2)
MONOCYTES NFR BLD: 10 % (ref 3–10)
NEUTS SEG # BLD: 5 K/UL (ref 1.8–8)
NEUTS SEG NFR BLD: 68 % (ref 40–73)
P-R INTERVAL, ECG05: 128 MS
PLATELET # BLD AUTO: 240 K/UL (ref 135–420)
PMV BLD AUTO: 10.3 FL (ref 9.2–11.8)
POTASSIUM SERPL-SCNC: 2.9 MMOL/L (ref 3.5–5.5)
Q-T INTERVAL, ECG07: 356 MS
QRS DURATION, ECG06: 78 MS
QTC CALCULATION (BEZET), ECG08: 459 MS
RBC # BLD AUTO: 4.54 M/UL (ref 4.2–5.3)
SODIUM SERPL-SCNC: 144 MMOL/L (ref 136–145)
TROPONIN I SERPL-MCNC: 0.05 NG/ML (ref 0–0.04)
TROPONIN I SERPL-MCNC: 0.05 NG/ML (ref 0–0.04)
VENTRICULAR RATE, ECG03: 100 BPM
WBC # BLD AUTO: 7.3 K/UL (ref 4.6–13.2)

## 2021-04-10 PROCEDURE — 74011250636 HC RX REV CODE- 250/636

## 2021-04-10 PROCEDURE — 96365 THER/PROPH/DIAG IV INF INIT: CPT

## 2021-04-10 PROCEDURE — 84484 ASSAY OF TROPONIN QUANT: CPT

## 2021-04-10 PROCEDURE — 85025 COMPLETE CBC W/AUTO DIFF WBC: CPT

## 2021-04-10 PROCEDURE — 93005 ELECTROCARDIOGRAM TRACING: CPT

## 2021-04-10 PROCEDURE — 96376 TX/PRO/DX INJ SAME DRUG ADON: CPT

## 2021-04-10 PROCEDURE — 74011000250 HC RX REV CODE- 250: Performed by: STUDENT IN AN ORGANIZED HEALTH CARE EDUCATION/TRAINING PROGRAM

## 2021-04-10 PROCEDURE — 96366 THER/PROPH/DIAG IV INF ADDON: CPT

## 2021-04-10 PROCEDURE — 74011636637 HC RX REV CODE- 636/637: Performed by: STUDENT IN AN ORGANIZED HEALTH CARE EDUCATION/TRAINING PROGRAM

## 2021-04-10 PROCEDURE — 94640 AIRWAY INHALATION TREATMENT: CPT

## 2021-04-10 PROCEDURE — 74011250636 HC RX REV CODE- 250/636: Performed by: STUDENT IN AN ORGANIZED HEALTH CARE EDUCATION/TRAINING PROGRAM

## 2021-04-10 PROCEDURE — 96360 HYDRATION IV INFUSION INIT: CPT

## 2021-04-10 PROCEDURE — 99285 EMERGENCY DEPT VISIT HI MDM: CPT

## 2021-04-10 PROCEDURE — 71045 X-RAY EXAM CHEST 1 VIEW: CPT

## 2021-04-10 PROCEDURE — 96361 HYDRATE IV INFUSION ADD-ON: CPT

## 2021-04-10 PROCEDURE — 74011250637 HC RX REV CODE- 250/637: Performed by: STUDENT IN AN ORGANIZED HEALTH CARE EDUCATION/TRAINING PROGRAM

## 2021-04-10 PROCEDURE — 80048 BASIC METABOLIC PNL TOTAL CA: CPT

## 2021-04-10 RX ORDER — CEPHALEXIN 500 MG/1
500 CAPSULE ORAL 4 TIMES DAILY
Qty: 28 CAP | Refills: 0 | Status: SHIPPED | OUTPATIENT
Start: 2021-04-10 | End: 2021-04-17

## 2021-04-10 RX ORDER — PREDNISONE 20 MG/1
60 TABLET ORAL DAILY
Qty: 12 TAB | Refills: 0 | Status: SHIPPED | OUTPATIENT
Start: 2021-04-10 | End: 2021-04-14

## 2021-04-10 RX ORDER — POTASSIUM CHLORIDE 7.45 MG/ML
INJECTION INTRAVENOUS
Status: COMPLETED
Start: 2021-04-10 | End: 2021-04-10

## 2021-04-10 RX ORDER — ALBUTEROL SULFATE 90 UG/1
2 AEROSOL, METERED RESPIRATORY (INHALATION)
Qty: 1 INHALER | Refills: 0 | Status: SHIPPED | OUTPATIENT
Start: 2021-04-10

## 2021-04-10 RX ORDER — POTASSIUM CHLORIDE 7.45 MG/ML
10 INJECTION INTRAVENOUS
Status: COMPLETED | OUTPATIENT
Start: 2021-04-10 | End: 2021-04-10

## 2021-04-10 RX ORDER — ACETAMINOPHEN 500 MG
1000 TABLET ORAL
Status: DISCONTINUED | OUTPATIENT
Start: 2021-04-10 | End: 2021-04-10 | Stop reason: HOSPADM

## 2021-04-10 RX ORDER — PREDNISONE 20 MG/1
60 TABLET ORAL
Status: COMPLETED | OUTPATIENT
Start: 2021-04-10 | End: 2021-04-10

## 2021-04-10 RX ORDER — KETOROLAC TROMETHAMINE 15 MG/ML
15 INJECTION, SOLUTION INTRAMUSCULAR; INTRAVENOUS
Status: DISCONTINUED | OUTPATIENT
Start: 2021-04-10 | End: 2021-04-10 | Stop reason: HOSPADM

## 2021-04-10 RX ORDER — CEPHALEXIN 250 MG/1
500 CAPSULE ORAL
Status: COMPLETED | OUTPATIENT
Start: 2021-04-10 | End: 2021-04-10

## 2021-04-10 RX ORDER — ALBUTEROL SULFATE 0.83 MG/ML
SOLUTION RESPIRATORY (INHALATION)
Qty: 60 EACH | Refills: 1 | Status: SHIPPED | OUTPATIENT
Start: 2021-04-10

## 2021-04-10 RX ORDER — FLUTICASONE FUROATE AND VILANTEROL TRIFENATATE 200; 25 UG/1; UG/1
1 POWDER RESPIRATORY (INHALATION) DAILY
Qty: 1 INHALER | Refills: 3 | Status: SHIPPED | OUTPATIENT
Start: 2021-04-10

## 2021-04-10 RX ORDER — IPRATROPIUM BROMIDE AND ALBUTEROL SULFATE 2.5; .5 MG/3ML; MG/3ML
3 SOLUTION RESPIRATORY (INHALATION)
Status: COMPLETED | OUTPATIENT
Start: 2021-04-10 | End: 2021-04-10

## 2021-04-10 RX ORDER — POTASSIUM CHLORIDE 20 MEQ/1
40 TABLET, EXTENDED RELEASE ORAL
Status: COMPLETED | OUTPATIENT
Start: 2021-04-10 | End: 2021-04-10

## 2021-04-10 RX ADMIN — PREDNISONE 60 MG: 20 TABLET ORAL at 07:59

## 2021-04-10 RX ADMIN — POTASSIUM CHLORIDE 10 MEQ: 7.46 INJECTION, SOLUTION INTRAVENOUS at 11:21

## 2021-04-10 RX ADMIN — POTASSIUM CHLORIDE 40 MEQ: 1500 TABLET, EXTENDED RELEASE ORAL at 07:59

## 2021-04-10 RX ADMIN — CEPHALEXIN 500 MG: 250 CAPSULE ORAL at 07:59

## 2021-04-10 RX ADMIN — IPRATROPIUM BROMIDE AND ALBUTEROL SULFATE 3 ML: .5; 3 SOLUTION RESPIRATORY (INHALATION) at 07:59

## 2021-04-10 RX ADMIN — POTASSIUM CHLORIDE 10 MEQ: 7.46 INJECTION, SOLUTION INTRAVENOUS at 13:43

## 2021-04-10 RX ADMIN — POTASSIUM CHLORIDE 10 MEQ: 7.46 INJECTION, SOLUTION INTRAVENOUS at 07:59

## 2021-04-10 NOTE — ED NOTES
Pt standing at sink washing up.   Pt took off home oxygen and monitor and refused to put it back on at this time

## 2021-04-10 NOTE — ED NOTES
Pt given prescriptions however states she does not have time to wait for discharged instructions to be printed or  for home oxygen repair . Pt called own ride and assisted to vehicle.

## 2021-04-10 NOTE — ED NOTES
Pt noted walking in the hallway with IV pole stating she was trying to see if she was short of breath when walking

## 2021-04-10 NOTE — ED PROVIDER NOTES
39-year-old female with past medical history significant for COPD on home O2, CHF, CKD and remainder of past medical history reviewed and documented below presents to the ED with complaint of shortness of breath. This feels the same as prior episodes of COPD exacerbation. She notes the shortness of breath has been present for the past 5 days and has been using her home inhaler without relief. She also endorses \"rib cage pain\" when she coughs that she describes as a sharp sensation. It is not exacerbated with movement. She denies any fever, chills, abdominal pain, nausea, vomiting or any other complaints. She smokes cigarettes but denies drinking or recreational drug use.            Past Medical History:   Diagnosis Date    CHF (congestive heart failure) (HCC)     Chronic respiratory failure with hypoxia ([address] 9/7/2020    CKD (chronic kidney disease) stage 3, GFR 30-59 ml/min 10/31/2019    Cocaine abuse ([address] 11/26/2017    COPD (chronic obstructive pulmonary disease) with chronic bronchitis ([address] 12/19/2017    Dependence on supplemental oxygen     Depression 3/22/2020    Drug-seeking behavior 11/19/2016    Endocrine disease     thyroid issues    Essential hypertension 3/10/2017    Fe deficiency anemia 10/27/2012    Fibromyalgia 12/16/2016    Gastroesophageal reflux disease without esophagitis 10/10/2016    Gastrointestinal disorder     \"blockage in my stomach\"    Hypercholesterolemia     Hypertension     Hypertensive heart failure ([address] 12/19/2017    Morbid obesity due to excess calories ([address] 3/10/2017    NSTEMI (non-ST elevated myocardial infarction) ([address] 3/22/2020    On home O2 12/3/2015    Overview:  2L at home per pt for approx 8 years    Polysubstance abuse ([address] 9/15/2018    Respiratory failure ([address] 1/11/2017    S/P hernia repair 10/31/2019    Sleep apnea 12/19/2017    T wave inversion in EKG 3/9/2019    Tobacco use 8/48/8465    Uncomplicated severe persistent asthma 12/13/2016    Vocal cord disease 12/7/2016       Past Surgical History:   Procedure Laterality Date    HX GI      Exploratory laparotomy with lysis of adhesions and primary repair of incarcerated umbilical hernia         No family history on file. Social History     Socioeconomic History    Marital status: SINGLE     Spouse name: Not on file    Number of children: Not on file    Years of education: Not on file    Highest education level: Not on file   Occupational History    Not on file   Social Needs    Financial resource strain: Not on file    Food insecurity     Worry: Not on file     Inability: Not on file    Transportation needs     Medical: Not on file     Non-medical: Not on file   Tobacco Use    Smoking status: Former Smoker     Packs/day: 0.25    Smokeless tobacco: Former User    Tobacco comment: Pt states she has not had money to buy cigarettes in the past month   Substance and Sexual Activity    Alcohol use: No     Comment: socially    Drug use: Not Currently     Types: Heroin     Comment: pt reports using approximately a month ago    Sexual activity: Not Currently     Comment: last used 9 years ago   Lifestyle    Physical activity     Days per week: Not on file     Minutes per session: Not on file    Stress: Not on file   Relationships    Social connections     Talks on phone: Not on file     Gets together: Not on file     Attends [employer] service: Not on file     Active member of club or organization: Not on file     Attends meetings of clubs or organizations: Not on file     Relationship status: Not on file    Intimate partner violence     Fear of current or ex partner: Not on file     Emotionally abused: Not on file     Physically abused: Not on file     Forced sexual activity: Not on file   Other Topics Concern    Not on file   Social History Narrative    Not on file         ALLERGIES: Patient has no known allergies.     Review of Systems   Constitutional: Negative for activity change and appetite change. HENT: Negative for drooling and facial swelling. Eyes: Negative for pain and discharge. Respiratory: Positive for shortness of breath. Negative for apnea and choking. Cardiovascular: Negative for palpitations and leg swelling. Gastrointestinal: Negative for blood in stool and rectal pain. Endocrine: Negative for polydipsia and polyphagia. Genitourinary: Negative for genital sores and hematuria. Musculoskeletal: Negative for gait problem and neck stiffness. Skin: Negative for color change and rash. Allergic/Immunologic: Negative for environmental allergies. Neurological: Negative for tremors. Hematological: Negative for adenopathy. Psychiatric/Behavioral: Negative for agitation and behavioral problems. There were no vitals filed for this visit. Physical Exam  Vitals signs and nursing note reviewed. Constitutional:       Appearance: Normal appearance. She is obese. HENT:      Head: Normocephalic and atraumatic. Eyes:      Extraocular Movements: Extraocular movements intact. Pupils: Pupils are equal, round, and reactive to light. Cardiovascular:      Rate and Rhythm: Normal rate and regular rhythm. Heart sounds: Normal heart sounds. No murmur. No friction rub. Pulmonary:      Effort: Pulmonary effort is normal.      Comments: Diffusely decreased breath sounds bilaterally with mild wheezing. Patient able to speak in full sentences without difficulty. Abdominal:      Palpations: Abdomen is soft. Musculoskeletal: Normal range of motion. Comments: 2+ pitting edema of the bilateral lower extremities. Multiple blisters present on the left lower extremity with mild surrounding cellulitis and tenderness to palpation. No fluctuance, crepitus or purulent discharge. Skin:     General: Skin is warm and dry. Capillary Refill: Capillary refill takes less than 2 seconds.    Neurological:      General: No focal deficit present. Mental Status: She is alert and oriented to person, place, and time. Psychiatric:         Mood and Affect: Mood normal.          MDM  Number of Diagnoses or Management Options  Acute exacerbation of chronic obstructive pulmonary disease (COPD) ([address]  Cellulitis of left lower extremity  Hypokalemia  Diagnosis management comments: 14-year-old female with past medical history significant for COPD on home O2 here with shortness of breath that feels the same as prior episodes of COPD exacerbation. Given her complex past medical history, will perform ACS work-up. Symptomatic control with duo nebs and prednisone. She has mild cellulitis of the left lower extremity; will initiate treatment with Keflex. Will reassess. EKG is normal sinus rhythm with normal intervals, no ST elevations or depressions, T wave inversion noted in lead V6, no significant changes compared to prior EKG dated 4/3/2021 as interpreted by me. Laboratory work-up is significant for hypokalemia with a potassium of 2.9. Oral and IV potassium ordered. Troponin is also slightly elevated at 0.05. Repeat troponin is exactly the same and patient is completely free of chest pain. Upon reevaluation, patient states she feels significantly better and is requesting to go home. Case management was consulted and stated they would assist the patient with obtaining a new oxygen machine at home. Patient seen ambulating throughout the ED frequently during her visit and is not exhibiting any signs of respiratory distress. She is stable for discharge with prescriptions for albuterol, Breo, prednisone and Keflex. Pt has been reexamined. Patient has no new complaints, changes, or physical findings. Care plan outlined and precautions discussed. Results were reviewed with the patient. All medications were reviewed with the patient; will d/c home with PMD f/u. All of pt's questions and concerns were addressed.  Patient was instructed and agrees to follow up with PMD, as well as to return to the ED upon further deterioration. Patient is ready to go home. This note was dictated utilizing voice recognition software which may lead to typographical errors.  I apologize in advance if the situation occurs.  If questions arise please do not hesitate to contact me or call our department.     Pauline Schneider, DO           Procedures

## 2021-04-10 NOTE — PROGRESS NOTES
Received call from Dr Gaitan Shown about pts home oxygen concentrator. States patient is saying the machine either doesn't work or doesn't work properly. Called [employer], her oxygen supplier, and spoke with answering service and she will page on call tech to call me back. also left message for Renetta Zaki asking to have someone go to patients home to check her concentrator. Received all from Will at [facility]. He will call patient to troubleshoot with her and send someone to fix it if needed. Called ED and spoke with Lianne Manzano. She states EMS checked the concentrator and told her it was working but pt didn't believe them. Pt has discharged from the ED already.   Rudy Ford RN - Outcomes Manager  [phone]

## 2021-04-10 NOTE — ED TRIAGE NOTES
Per EMS, Patient was fighting with medic about her home 02 wasn't working. After about 15 minutes talked her into getting into the ambulance. She was given a combi treatement. Patient is somnolent at this time.

## 2021-04-10 NOTE — PROGRESS NOTES
conducted an initial consultation and Spiritual Assessment for Gunnar Paniagua, who is a 63 y.o.,female. Patient's Primary Language is: [place]. According to the patient's EMR Judaism Affiliation is: [place].      The reason the Patient came to the hospital is:   Patient Active Problem List    Diagnosis Date Noted    BiPAP (biphasic positive airway pressure) dependence 03/28/2021    Elevated troponin 03/18/2021    Chest pain at rest 03/10/2021    Hypokalemia 02/26/2021    Bilateral lower leg cellulitis 02/26/2021    Long QT interval 01/08/2021    CHF (congestive heart failure) ([address] 12/23/2020    Acute bronchitis with chronic obstructive pulmonary disease (COPD) ([address] 11/28/2020    Asthma exacerbation 11/12/2020    COPD (chronic obstructive pulmonary disease) ([address] 11/12/2020    UTI (urinary tract infection) 10/08/2020    Atypical chest pain 10/08/2020    DEMARCUS (acute kidney injury) ([address] 10/08/2020    Acute exacerbation of chronic obstructive pulmonary disease (COPD) ([address] 09/14/2020    COPD with acute exacerbation ([address] 09/07/2020    Chronic respiratory failure with hypoxia ([address] 09/07/2020    Acute bronchitis 09/07/2020    Suspected COVID-19 virus infection 09/07/2020    Tachycardia 09/07/2020    Normocytic anemia 09/07/2020    Acute respiratory distress 09/07/2020    COPD exacerbation ([address] 09/07/2020    Depression 03/22/2020    S/P hernia repair 10/31/2019    CKD (chronic kidney disease) stage 3, GFR 30-59 ml/min ([address] 10/31/2019    Tobacco use 09/28/2019    T wave inversion in EKG 03/09/2019    HLD (hyperlipidemia) 09/15/2018    Polysubstance abuse ([address] 09/15/2018    Hypertensive heart failure ([address] 12/19/2017    Sleep apnea 12/19/2017    COPD (chronic obstructive pulmonary disease) with chronic bronchitis ([address] 12/19/2017    Cocaine abuse ([address] 11/26/2017    Essential hypertension 03/10/2017    Morbid obesity due to excess calories ([facility] 03/10/2017    Acute exacerbation of COPD with asthma ([facility] 03/02/2017    Respiratory failure ([facility] 01/11/2017    Fibromyalgia 73/50/0802    Uncomplicated severe persistent asthma 12/13/2016    Vocal cord disease 12/07/2016    Drug-seeking behavior 11/19/2016    Gastroesophageal reflux disease without esophagitis 10/10/2016    Thyroid goiter 06/30/2016    Asthma 03/05/2016    On home O2 12/03/2015    Abnormal CT of the chest 11/20/2015    MDRO (multiple drug resistant organisms) resistance 11/08/2012    Fe deficiency anemia 10/27/2012        The  provided the following Interventions:  Initiated a relationship of care and support as I provided the clothes she requested from the clothing closet. Chart reviewed. The following outcomes where achieved:  Patient expressed gratitude for 's visit and the clothing. Assessment:  Patient does not express any Alevism/cultural needs that will affect patient's preferences in health care. There are no spiritual or Alevism issues which require intervention at this time. Plan:  Chaplains will continue to follow and will provide pastoral care on an as needed/requested basis.  recommends bedside caregivers page  on duty if patient shows signs of acute spiritual or emotional distress.     [address] [place]   [phone]

## 2021-05-24 NOTE — ED NOTES
Bedside and Verbal shift change report given to Rosy Jeffery 43 (oncoming nurse) by Richard Edwards RN (offgoing nurse). Report included the following information SBAR, Kardex, Intake/Output and MAR. Heme/Onc Heme/Onc Internal Medicine Heme/Onc Heme/Onc Internal Medicine

## 2021-06-08 NOTE — ED NOTES
I have reviewed discharge instructions with the patient. The patient verbalized understanding. MRSA nasal swab has come back positive.  Patient has been started on ethyl alcohol swabs, if CRP continues to rise tomorrow threshold to start vancomycin will be low in discussion with infectious disease.

## 2021-08-03 PROBLEM — J44.9 COPD (CHRONIC OBSTRUCTIVE PULMONARY DISEASE) (HCC): Status: RESOLVED | Noted: 2020-11-12 | Resolved: 2021-08-03

## 2021-11-05 NOTE — PROGRESS NOTES
1030: Pt arrived floor via bed. Pt transferred herself to bed, questioned nurse why she is wearing the yellow gown (isolation gown). Nurse informed pt that it is an isolation gown, pt started yelling that she doesn't have Covid-19 infection, she has been tested numerous times while she has been here, even last night that she is not positive. Nurse explained to pt that she is on rule out because the test result is not yet available. Pt was continuously demanding, using vulgar language, and abusive. She demanded breakfast, nurse informed pt that she will contact dietary for that. In the interim, she brought a plate of [employer] food and demanded it be warmed for her. Nurse informed her that the food is already in her room and can't be taken out to be warmed. Pt asked for coffee which was given, offered her gram crackers and dietary menu and a number to call and order her lunch. At the same time pt was asking for her Metadone. Nurse gave pt her scheduled 10 mg metadone, pt asked how many milligram she is getting. Nurse told her 10 mg, pt flared up and said she takes 70 mg, 10 mg won't do her any good. Pt heard nurses talking and laughing in the nursing station and started using abusive and curse words, calling them bitches and using vulgar words. Pt jumped OOB and to the door, nurse followed her and asked pt to go back in her room. Pt refused shouting and creating a scene in front of her room. Nurse informed nurse admin and security. [address] [facility], the director of nursing, nursing admin. , and security came and talked to the pt. Pt finally calmed down and is resting in her bed. 1910: Bedside and Verbal shift change report given to ROSALINE Solano (oncoming nurse) by Ave Mcqueen RN (offgoing nurse). Report included the following information SBAR, Kardex, Intake/Output, MAR and Cardiac Rhythm NSR. Caller: Latanya Olsen    Relationship: Self    Best call back number: [phone]  What is the best time to reach you: ANYTIME    Who are you requesting to speak with (clinical staff, provider,  specific staff member): CLINICAL    Do you know the name of the person who called: N/A    What was the call regarding: PATIENT CALLED IN TODAY NEEDING A WRITTEN PRESCRIPTION FOR: linaclotide (LINZESS) 145 MCG capsule capsule. PATIENT STATES SHE GETS THIS THROUGH THE  AND NEEDS A WRITTEN PRESCRIPTION TO COME  IN OFFICE. SHE WOULD LIKE TO GET ENOUGH SUPPLY TO LAST HER THE REST OF THE YEAR. PATIENT MISPLACED HER OTHER WRITTEN PRESCRIPTION AND NEEDS ANOTHER ONE ASAP.    Do you require a callback: YES

## 2021-12-21 NOTE — ROUTINE PROCESS
Bedside and Verbal shift change report given to [address] [place] [address] [address] (oncoming nurse) by Consuelo Mcmanus RN (offgoing nurse). Report included the following information SBAR, Kardex and MAR. no discharge, no irritation, no pain, no redness, and no visual changes.

## 2022-02-07 NOTE — PROGRESS NOTES
Bedside verbal shift change report given to [facility] RN (oncoming nurse) by Diandra Churchill   RN (offgoing nurse). Patient is stable and on bi-pap at time of shift change,   Report given with SBAR and ED Summary. no

## 2022-05-06 NOTE — ED TRIAGE NOTES
Patient presents to the ED via EMS with complaints of abdominal pain. Per EMS patient states she hasn't had a bowel movement for 3 days. Patient alert and oriented x 4. No obvious signs of distress noted. Physical Therapy  Cancellation/No-show Note  Patient Name:  Osmel Crisostomo   :  1990   Date:  2022  Cancelled visits to date: 03  No-shows to date: 0    For today's appointment patient:  [x]  Cancelled  []  Rescheduled appointment  []  No-show     Reason given by patient:  []  Patient ill  []  Conflicting appointment  []  No transportation    []  Conflict with work   []  No reason given  [x]  Other:     Comments: Denied C9 Code, Patient will contact to discuss appeal.       Electronically signed by:  Gloria Moser PT, DPT

## 2022-05-06 NOTE — ED TRIAGE NOTES
Pt brought by medic for shortness of breath and chest pain. Pt stated she has been out of her blood pressure medications for the last 2 days. She gave herself 3 albuteral tx before calling medics. She received 1 tx by medic. Upon arrival pt hypertensive and c/o of headache. Spironolactone Counseling: Patient advised regarding risks of diarrhea, abdominal pain, hyperkalemia, birth defects (for female patients), liver toxicity and renal toxicity. The patient may need blood work to monitor liver and kidney function and potassium levels while on therapy. The patient verbalized understanding of the proper use and possible adverse effects of spironolactone.  All of the patient's questions and concerns were addressed.

## 2022-08-15 NOTE — PROGRESS NOTES
Pt refused to let nursing staff take her vital signs. Refused to have her glucose via acc-check taken. Stated she was not a diabetic, using several expletives. Returned after an hour, thinking pt would calm down some. Pt yelling at nursing staff using several expletives. Pt snatched tele off, and pulled iv out. Denied doing either. Pt refused to let resp give her a treatment. Stated she did not need it. Pt took some of her meds ,then refused others. Pt asked for some coffee, and said thankyou after receiving it. Pt went to sleep . none

## 2023-01-26 NOTE — PROGRESS NOTES
Security  Here to escort patient to ride. Patient refuses to  Sign AMA papers. Patient dressed, has her own  portable oxygen with her and a ride waiting downstairs. Arm bands removed and shredded per protocol. Patient agitated and yelling. Low Dose Naltrexone Counseling- I discussed with the patient the potential risks and side effects of low dose naltrexone including but not limited to: more vivid dreams, headaches, nausea, vomiting, abdominal pain, fatigue, dizziness, and anxiety.

## 2023-05-10 NOTE — PROGRESS NOTES
Cherise ([employer]) made aware that pt stated that home BIPAP machine is not working; Pedro Kurtz will see patient. Contacted the [facility] [phone] [phone] twice this evening re: authorization of Xanax and Percocet for patient; finally, [facility] Outpatient pharmacy was able to fill the prescription for Percocet; however, [facility] Out[facility]) stated that Xanax was not approved; Dr. Saw Peterson and patient made aware. 6:20 pm  Also, patient requested cab voucher d/t she can't get a ride home; cab voucher completed; spoke with Hernán Minaya ([place] [address]) [phone] who informed CM that cab will pick-up patient in 30 minutes; patient and unit nurse made aware of pick-up time. Anesthesia Volume In Cc: 3

## 2023-12-01 NOTE — PERIOP NOTES
TRANSFER - OUT REPORT:    Verbal report given to [facility] RN on Verl Trena  being transferred to [facility] for routine post - op       Report consisted of patients Situation, Background, Assessment and   Recommendations(SBAR). Information from the following report(s) SBAR, OR Summary, Procedure Summary, Intake/Output, MAR, Recent Results and Cardiac Rhythm NSR was reviewed with the receiving nurse. Lines:   Peripheral IV 10/13/19 Right;Posterior Wrist (Active)   Site Assessment Clean, dry, & intact 10/13/2019 11:09 PM   Phlebitis Assessment 0 10/13/2019 11:09 PM   Infiltration Assessment 0 10/13/2019 11:09 PM   Dressing Status Clean, dry, & intact 10/13/2019 11:09 PM   Dressing Type Transparent 10/13/2019 11:09 PM   Hub Color/Line Status Blue; Infusing;Patent 10/13/2019 11:09 PM   Alcohol Cap Used No 10/13/2019  9:30 PM        Opportunity for questions and clarification was provided.       Patient transported with:   O2 @ 3 liters  Registered Nurse CC: f/u for polymicrobial bacteremia    Patient reports: she is comfortable, no acute complaints    REVIEW OF SYSTEMS:  All other review of systems negative (Comprehensive ROS)    Antimicrobials Day #  :day 6  ertapenem  IVPB 500 milliGRAM(s) IV Intermittent every 24 hours    Other Medications Reviewed    T(F): 98.3 (12-01-23 @ 05:46), Max: 98.3 (12-01-23 @ 05:46)  HR: 67 (12-01-23 @ 05:46)  BP: 156/66 (12-01-23 @ 05:46)  RR: 15 (12-01-23 @ 05:46)  SpO2: 97% (12-01-23 @ 05:46)  Wt(kg): --    PHYSICAL EXAM:  General: alert, no acute distress  Eyes:  anicteric, no conjunctival injection, no discharge  Oropharynx: no lesions or injection 	  Neck: supple, without adenopathy  Lungs: clear to auscultation  Heart: regular rate and rhythm; no murmur, rubs or gallops  Abdomen: soft, non distended, mild rt sided fullness,,  Skin: no lesions  Extremities: no clubbing, cyanosis, or edema  Neurologic: alert, oriented, moves all extremities    LAB RESULTS:                        8.9    3.89  )-----------( 63       ( 01 Dec 2023 05:48 )             29.4     12-01    139  |  107  |  84<H>  ----------------------------<  82  4.7   |  20<L>  |  3.82<H>    Ca    7.7<L>      01 Dec 2023 05:48  Phos  5.6     12-01  Mg     1.9     12-01    TPro  4.8<L>  /  Alb  1.8<L>  /  TBili  0.4  /  DBili  x   /  AST  19  /  ALT  27  /  AlkPhos  232<H>  11-30    LIVER FUNCTIONS - ( 30 Nov 2023 06:25 )  Alb: 1.8 g/dL / Pro: 4.8 g/dL / ALK PHOS: 232 U/L / ALT: 27 U/L / AST: 19 U/L / GGT: x           Urinalysis Basic - ( 01 Dec 2023 05:48 )    Color: x / Appearance: x / SG: x / pH: x  Gluc: 82 mg/dL / Ketone: x  / Bili: x / Urobili: x   Blood: x / Protein: x / Nitrite: x   Leuk Esterase: x / RBC: x / WBC x   Sq Epi: x / Non Sq Epi: x / Bacteria: x      MICROBIOLOGY:  RECENT CULTURES:  11-26 @ 22:20 Clean Catch Clean Catch (Midstream)     <10,000 CFU/mL Normal Urogenital Cheryl      11-26 @ 14:05 .Blood Blood-Peripheral Blood Culture PCR  Citrobacter koseri    Growth in aerobic and anaerobic bottles: Citrobacter koseri  See previous culture [id number]  Growth in aerobic and anaerobic bottles: Bacteroides fragilis  "Susceptibilities not performed"    Growth in aerobic bottle: Gram Negative Rods  Growth in anaerobic bottle: Gram Negative Rods        RADIOLOGY REVIEWED:

## 2024-01-25 NOTE — PROGRESS NOTES
1604 D/c instructions along with prescriptions given to patient with verbal understanding of instuctions. D/cd off floor in a wheelchair. Yes

## 2024-03-28 NOTE — PROGRESS NOTES
0825 Received on rounds sitting up in bed eating breakfast. A/OX4. Obese. 02 2 liters via nasal cannula. Lung sound diminished bilaterally. Productive cough. C/O \"pain to chest area from coughing\", per pt. Medicated with Percocet 1 po. Side rails X2. Call bell phone within reach. 1150 Patient left off floor without permission, in a hospital wheelchair given to patient for floor use only. [address] brought patient back to floor. Dr. Cheyenne Thurston on floor and made aware. Patient advised not to leave off floor. 28-Mar-2013

## 2024-07-10 NOTE — PROGRESS NOTES
Primary Care Provider Appointment   [facility]       Patient ID: Maxx Castro is a 70 y.o. male.    ASSESSMENT/PLAN by Problem List:    1. Essential hypertension  Assessment & Plan:  Currently stable.  He is recently adjusted his medication timing which has improved episodes of hypotension.  Continue current medication regimen.  Will continue to follow.      2. Coronary artery disease involving native coronary artery of native heart without angina pectoris  Assessment & Plan:  Doing well.  Wife notices significant improvement.  He has no longer having increased fatigue or shortness a breath.  Continue Plavix and aspirin.  Patient has follow-up with Cardiology on August 9th.  Will continue to follow.      3. Dyslipidemia  Assessment & Plan:  Recently started on Crestor.  No complaints today.  Has upcoming repeat lipids again.      4. Severe obesity  Assessment & Plan:  Unfortunately as of right now Ozempic is not covered.  Patient has limited mobility of the right knee which impedes traditional exercise.  He has been trying to do some weights and exercises with upper body.  He is also in cardiac rehab at this time.  They continue to work on nutritional changes.  Will continue to monitor.           Follow Up:  As scheduled    My total time spent on this encounter was 42 minutes which included  the following activities: preparing to see the patient, performing a medically appropriate and/or evaluation, counseling and educating the patient and family/caregiver, ordering medications, tests, or procedures, referring and communicating with other healthcare providers, documenting clinical information in the electronic or other health record, and independently interpreting results. This time is independent and non-overlapping.        Subjective:     Chief Complaint   Patient presents with    Follow-up     Follow up visit     I have reviewed the information entered by the ancillary staff  Patient maintained O2 stats of 98 to 99 percent while ambulating and after "regarding the chief complaint as well as the related history.    70-year-old male presents for 4 week follow-up.  Unfortunately Ozempic was not able to be filled.  Other than this he is doing well.  Continues to improve after stent placement.  Fatigue improved.  Shortness a breath improved.        Patient is a/an 70 y.o.  male       For complete problem list, past medical history, surgical history, social history, etc., see appropriate section in the electronic medical record    Review of Systems   Constitutional:  Negative for fatigue.   Respiratory:  Negative for shortness of breath.    Cardiovascular:  Negative for chest pain.   Musculoskeletal:  Positive for arthralgias.   All other systems reviewed and are negative.      Objective     Physical Exam  Vitals and nursing note reviewed.   Constitutional:       General: He is not in acute distress.     Appearance: He is not toxic-appearing or diaphoretic.   HENT:      Head: Normocephalic and atraumatic.      Right Ear: External ear normal.      Left Ear: External ear normal.   Cardiovascular:      Rate and Rhythm: Normal rate and regular rhythm.   Pulmonary:      Effort: Pulmonary effort is normal. No respiratory distress.   Skin:     General: Skin is warm and dry.      Coloration: Skin is not jaundiced or pale.   Neurological:      Mental Status: He is alert.   Psychiatric:         Mood and Affect: Mood normal.         Behavior: Behavior normal.         Thought Content: Thought content normal.         Judgment: Judgment normal.       Vitals:    07/10/24 1057   BP: 136/70   Pulse: 73   SpO2: 96%   Weight: (!) 158 kg (348 lb 5.2 oz)   Height: 5' 5" (1.651 m)           THIS DOCUMENT WAS MADE IN PART WITH VOICE RECOGNITION SOFTWARE.  OCCASIONALLY THIS SOFTWARE WILL MISINTERPRET WORDS OR PHRASES.  "

## 2024-08-22 NOTE — ED NOTES
General Call    Contacts       Contact Date/Time Type Contact Phone/Fax    08/22/2024 10:22 AM CDT Phone (Incoming) Bradford Ruelas (Self) [phone] (M)          Reason for Call:  Please call pt, concerned about weight gain.         Okay to leave a detailed message?: Yes at Cell number on file:    Telephone Information:   Mobile [phone]        Hourly rounding-pt resting in bed, hospital bed provided, side rails up, call bell in reach, vitals stable, no complaints at this time.

## 2024-08-25 NOTE — PROGRESS NOTES
03/03/17 1210 03/03/17 1541 03/03/17 1552   Patient Observation   Observations pt feeling better after breathing treatment. eating. pain med given pt refuse to have heparin injection and insulin injection. VTE Assessment already completed for this visit

## 2024-09-16 NOTE — PROGRESS NOTES
ID: Linda Rausch  : 1965  Date: 2024     Referred by Dr. Feldman    Chief Complaint   Patient presents with    Other     \"Reports polyp or piece of skin by urethra. Was informed this by PCP upon exam 2024\"       HPI:  59 year old female, G1,  Vaginal deliveries x1, who presents for evaluation of \"urethral redness\" on recent exam per PCP.   Denies pain, no bleeding, no incontinence.  Has a uterus.  Not on vaginal estrogen  Not sexually active at this time.   Wonder is she has a bulge. Feels \"lump\" on right side of vagina.     PMHx: HTN, high BMI of 33.     Urogynecology Summary:  Urogynecology Summary  Prolapse: No (\"Denies feeling bulge. However feels polyp on right side\")  YEIMY: No  Urge Incontinence: No  Nocturia Frequency: 2  Frequency: Greater than 3 hours  Incomplete emptying: No  Constipation: No (Bowels reviewed.)  Wears pad day?: 0  Wears Pad Night?: 0  Currently Sexually Active: No  Avoids sexual activity due to:  (No partner at this times)          HISTORY:  Past Medical History:    Osteoarthritis      Past Surgical History:   Procedure Laterality Date    Removal of heel spur        Family History   Problem Relation Age of Onset    Cancer Sister       Social History     Socioeconomic History    Marital status:    Tobacco Use    Smoking status: Never    Smokeless tobacco: Never   Substance and Sexual Activity    Alcohol use: No    Drug use: No     Social Determinants of Health     Financial Resource Strain: Low Risk  (2024)    Received from [facility]    Overall Financial Resource Strain (CARDIA)     Difficulty of Paying Living Expenses: Not very hard   Recent Concern: Financial Resource Strain - Medium Risk (2024)    Received from [facility]    Overall Financial Resource Strain (CARDIA)     Difficulty of Paying Living Expenses: Somewhat hard   Food Insecurity: No Food Insecurity (2024)    Received from [facility]    Hunger Vital Sign     Worried About Running Out of Food in the Last Year: Never true     Ran Out of Food in the Last Year: Never true   Recent Concern: Food Insecurity - Food Insecurity Present (4/23/2024)    Received from [facility]    Hunger Vital Sign     Worried About Running Out of Food in the Last Year: Sometimes true     Ran Out of Food in the Last Year: Sometimes true   Transportation Needs: No Transportation Needs (7/8/2024)    Received from [facility]    PRAPARE - Transportation     Lack of Transportation (Medical): No     Lack of Transportation (Non-Medical): No   Physical Activity: Insufficiently Active (7/8/2024)    Received from [facility]    Exercise Vital Sign     Days of Exercise per Week: 3 days     Minutes of Exercise per Session: 20 min   Stress: Stress Concern Present (7/8/2024)    Received from [facility]    [employer] - Occupational Stress Questionnaire     Feeling of Stress : Very much   Social Connections: Socially Isolated (7/8/2024)    Received from [facility]    Social Connection and Isolation Panel [NHANES]     Frequency of Communication with Friends and Family: Three times a week     Frequency of Social Gatherings with Friends and Family: Three times a week     Attends Yazidism Services: Never     Active Member of Clubs or Organizations: No     Attends Club or Organization Meetings: Never     Marital Status:    Housing Stability: Low Risk  (7/8/2024)    Received from [facility]    Housing Stability Vital Sign     Unable to Pay for Housing in the Last Year: No     Number of Places Lived in the Last Year: 1     Unstable Housing in the Last Year: No        Allergies:  No Known Allergies    Medications:  Outpatient Medications Prior to Visit   Medication Sig Dispense Refill    amLODIPine 5 MG Oral Tab  Take 1 tablet (5 mg total) by mouth daily.      Cholecalciferol (VITAMIN D3) 10 MCG (400 UNIT) Oral Cap Take by mouth.      Calcium Carbonate Antacid 600 MG Oral Chew Tab Chew 600 mg by mouth.      ibuprofen 200 MG Oral Tab Take 1 tablet (200 mg total) by mouth every 6 (six) hours as needed for Pain.      Amoxicillin-Pot Clavulanate 875-125 MG Oral Tab Take 1 tablet by mouth every 12 (twelve) hours. (Patient not taking: Reported on 9/17/2024) 20 tablet 0    Montelukast Sodium 10 MG Oral Tab Take 1 tablet (10 mg total) by mouth nightly. (Patient not taking: Reported on 9/17/2024) 30 tablet 3    Fluticasone Propionate 50 MCG/ACT Nasal Suspension 1 spray by Nasal route 2 (two) times daily. (Patient not taking: Reported on 9/17/2024) 1 Bottle 3    loratadine 10 MG Oral Tab Take 1 tablet (10 mg total) by mouth daily. (Patient not taking: Reported on 9/17/2024) 30 tablet 3    methylPREDNISolone 4 MG Oral Tablet Therapy Pack Take by oral route. (Patient not taking: Reported on 9/17/2024) 1 Package 0     No facility-administered medications prior to visit.       Review of Systems:    A comprehensive 12 point review of systems was completed.  Pertinent positives noted in the the HPI.  Denies CP  Denies SOB    Vitals:  Resp 18   Ht 64\"   Wt 193 lb (87.5 kg)   BMI 33.13 kg/m²        GENERAL EXAM:  GENERAL:  Alert and oriented. Well-nourished, normally developed.  Thought and emotional status are appropriate, speech is understandable.  No acute distress.   HEAD: Normocephalic and atraumatic with normal hair distribution  LUNGS:  Normal respiratory effort.    ABDOMEN: Non tender to palpation, tone normal without rigidity or guarding, no masses present, no evidence of hernia.   EXTREMITIES:  Without edema, varicosities or lesions.   SKIN:  Warm and dry, with good color and turgor. No lesions.    PELVIC EXAM:  External Genitalia: Normal appearance for age. + atrophy, no lesions  Urethra: + atrophy, non tender, +  caruncle  Bladder:no fullness, non tender  Vagina: + atrophy, no lesions   Cervix: no bleeding, no lesions, non tender  Uterus: soft, mobile, non tender  Adnexa:no masses, non tender  Perineum: non tender  Anus: no hemorrhoids  Rectum: deferred.     PELVIS FLOOR NEUROMUSCULAR FUNCTION:  Strength:  2  Perineal Sensation:  Normal      PELVIC SUPPORT: Patient examined supine and standing.  [place]:  0  Ant:  0  Post:  0  CST:  negative  UVJ: not hypermobile    Impression/Plan:    ICD-10-CM    1. Urethral caruncle  N36.2 estradiol (ESTRACE) 0.1 MG/GM Vaginal Cream      2. Vaginal atrophy  N95.2           Discussion Items:   Topical estrogen therapy for treating UGA  Reassured regarding the absence of prolapse on exam.   Discussed benign nature of urethral caruncle.   Discussed dietary and behavioral modification, discussed pharmacologic and nonpharmacologic mgmt options for urinary symptoms. Discussed dietary & weight management with potential improvements in symptoms with weight loss.    Diagnostic Items:  None    Medications Discussed:  Estrace vaginal cream 1 gram per vagina x 3 per week.     Treatment Plan, Non-surgical:   None    Treatment Plan, Surgical:   None    Pt verbalizes understanding of all above discussed information. Follow up in 3 months. OK to see PA.     Alysa Trejo MD, FACOG, FACS  Female Pelvic Medicine and  Reconstructive Surgery (Urogynecology)

## 2024-09-17 ENCOUNTER — OFFICE VISIT (OUTPATIENT)
Dept: UROLOGY | Facility: HOSPITAL | Age: 59
End: 2024-09-17
Attending: OBSTETRICS & GYNECOLOGY
Payer: COMMERCIAL

## 2024-09-17 VITALS — WEIGHT: 193 LBS | RESPIRATION RATE: 18 BRPM | HEIGHT: 64 IN | BODY MASS INDEX: 32.95 KG/M2

## 2024-09-17 DIAGNOSIS — N95.2 VAGINAL ATROPHY: ICD-10-CM

## 2024-09-17 DIAGNOSIS — N36.2 URETHRAL CARUNCLE: Primary | ICD-10-CM

## 2024-09-17 PROCEDURE — 99212 OFFICE O/P EST SF 10 MIN: CPT

## 2024-09-17 RX ORDER — IBUPROFEN 800 MG
TABLET ORAL
COMMUNITY

## 2024-09-17 RX ORDER — ESTRADIOL 0.1 MG/G
CREAM VAGINAL
Qty: 42.5 G | Refills: 3 | Status: SHIPPED | OUTPATIENT
Start: 2024-09-17

## 2024-09-17 RX ORDER — AMLODIPINE BESYLATE 5 MG/1
5 TABLET ORAL DAILY
COMMUNITY

## 2024-12-17 NOTE — ROUTINE PROCESS
----- Message from Wally Hardin sent at 8/9/2017  9:52 AM CDT -----  Contact: pt  _x 1st Request   _ 2nd Request   _ 3rd Request     Who: MATI DE LA CRUZ [[id number]]    Why: pt called stated her prescription was sent to the wrong [facility].  She would like her Rx desog-e.estradiol/e.estradiol (KARIVA) 0.15-0.02 mgx21 /0.01 mg x 5 per tablet [facility] to [id number] [place] - [address] [phone]    What Number to Call Back: [phone]    When to Expect a call back: (Before the end of the day)   -- if call after 3:00 call back will be tomorrow.    
2021 Pt received after bedside shift report form KILEY GERMAIN RN. Pt in bed with no distress noted. Instruct to call for assistance. Call bell in reach. Pt discharged home. Discharged instructions given as ordered. Pt verbalized understanding. Opportunity for questions and clarification provided. All questions answered. Left unit via stretcher medical transport. All belongings with patient.
Please advise.Thanks.  
TRANSFER - IN REPORT:    Verbal report received from Arnold(name) on Fredo Mckeon  being received from ED(unit) for routine progression of care      Report consisted of patients Situation, Background, Assessment and   Recommendations(SBAR). Information from the following report(s) SBAR, Kardex and Recent Results was reviewed with the receiving nurse. Opportunity for questions and clarification was provided. Assessment completed upon patients arrival to unit and care assumed. Bedside and Verbal shift change report given to Elmira Peralta (oncoming nurse) by Tejinder(offgoing nurse). Report included the following information SBAR, Kardex, MAR and Recent Results.
TRANSFER - OUT REPORT:    Verbal report given to Jovany Allred  on [facility]  being transferred to [address] [place] [address] for routine progression of care       Report consisted of patients Situation, Background, Assessment and   Recommendations(SBAR). Information from the following report(s) SBAR was reviewed with the receiving nurse. Lines:   Peripheral IV 02/26/17 Right Antecubital (Active)   Site Assessment Clean, dry, & intact 2/26/2017  2:37 AM   Phlebitis Assessment 0 2/26/2017  2:37 AM   Infiltration Assessment 0 2/26/2017  2:37 AM   Dressing Status Clean, dry, & intact 2/26/2017  2:37 AM   Dressing Type Transparent 2/26/2017  2:37 AM   Hub Color/Line Status Patent; Flushed 2/26/2017  2:37 AM        Opportunity for questions and clarification was provided.       Patient transported with:   Monitor  Registered Nurse
Detail Level: Detailed
Quality 226: Preventive Care And Screening: Tobacco Use: Screening And Cessation Intervention: Patient screened for tobacco use and is an ex/non-smoker
Quality 130: Documentation Of Current Medications In The Medical Record: Current Medications Documented
Quality 431: Preventive Care And Screening: Unhealthy Alcohol Use - Screening: Patient screened for unhealthy alcohol use using a single question and scores less than 2 times per year
Quality 110: Preventive Care And Screening: Influenza Immunization: Influenza Immunization Administered during Influenza season
